# Patient Record
Sex: FEMALE | Race: WHITE | Employment: OTHER | URBAN - METROPOLITAN AREA
[De-identification: names, ages, dates, MRNs, and addresses within clinical notes are randomized per-mention and may not be internally consistent; named-entity substitution may affect disease eponyms.]

---

## 2017-06-27 PROBLEM — E66.09 NON MORBID OBESITY DUE TO EXCESS CALORIES: Status: ACTIVE | Noted: 2017-06-27

## 2018-08-06 ENCOUNTER — OFFICE VISIT (OUTPATIENT)
Dept: CARDIOLOGY CLINIC | Age: 63
End: 2018-08-06
Payer: MEDICARE

## 2018-08-06 VITALS
WEIGHT: 200 LBS | RESPIRATION RATE: 16 BRPM | BODY MASS INDEX: 36.8 KG/M2 | DIASTOLIC BLOOD PRESSURE: 80 MMHG | SYSTOLIC BLOOD PRESSURE: 118 MMHG | HEART RATE: 89 BPM | HEIGHT: 62 IN

## 2018-08-06 DIAGNOSIS — Z94.0 S/P CADAVER RENAL TRANSPLANT: ICD-10-CM

## 2018-08-06 DIAGNOSIS — N18.6 ESRD (END STAGE RENAL DISEASE) (HCC): ICD-10-CM

## 2018-08-06 DIAGNOSIS — R94.31 ABNORMAL EKG: Primary | ICD-10-CM

## 2018-08-06 DIAGNOSIS — E66.09 NON MORBID OBESITY DUE TO EXCESS CALORIES: ICD-10-CM

## 2018-08-06 DIAGNOSIS — I10 ESSENTIAL HYPERTENSION: ICD-10-CM

## 2018-08-06 DIAGNOSIS — I73.9 PERIPHERAL VASCULAR DISEASE (HCC): ICD-10-CM

## 2018-08-06 DIAGNOSIS — I44.7 LBBB (LEFT BUNDLE BRANCH BLOCK): ICD-10-CM

## 2018-08-06 DIAGNOSIS — I42.8 NONISCHEMIC CARDIOMYOPATHY (HCC): ICD-10-CM

## 2018-08-06 PROCEDURE — 99214 OFFICE O/P EST MOD 30 MIN: CPT | Performed by: INTERNAL MEDICINE

## 2018-08-06 PROCEDURE — G8417 CALC BMI ABV UP PARAM F/U: HCPCS | Performed by: INTERNAL MEDICINE

## 2018-08-06 PROCEDURE — G8427 DOCREV CUR MEDS BY ELIG CLIN: HCPCS | Performed by: INTERNAL MEDICINE

## 2018-08-06 PROCEDURE — 93000 ELECTROCARDIOGRAM COMPLETE: CPT | Performed by: INTERNAL MEDICINE

## 2018-08-06 PROCEDURE — 3017F COLORECTAL CA SCREEN DOC REV: CPT | Performed by: INTERNAL MEDICINE

## 2018-08-06 PROCEDURE — 1036F TOBACCO NON-USER: CPT | Performed by: INTERNAL MEDICINE

## 2018-08-06 RX ORDER — LISINOPRIL 5 MG/1
5 TABLET ORAL DAILY
Status: ON HOLD | COMMUNITY
End: 2021-11-27 | Stop reason: HOSPADM

## 2018-08-06 NOTE — PROGRESS NOTES
or short of breath   No palpitations, dizzy or syncope. Active at home   No orthopnea   Try to watch diet   Compliant with all medications       Past Medical History:   Diagnosis Date    Abnormal EKG     left bundle branch block    Cancer (HCC)     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    Cerebrovascular disease     Clotting disorder ([facility] [address] 1985    thrombophlebitis after c section delivery    Depression     15 years followed by dr Giselle Elliott Hyperlipidemia     Hypertension     Hyperthyroidism     Peripheral vascular disease ([facility] [address]     Renal failure 11/2012    renal transplant    Thrombophlebitis     after c section delivery    Thyroid disease             Past Surgical History:   Procedure Laterality Date   [address]    one normal delivery    COLONOSCOPY      DIAGNOSTIC CARDIAC CATH LAB PROCEDURE  2006    normal coronaries and 1996 normal coronaries    DIALYSIS FISTULA CREATION  march and july 2012    phase one and two patient will start dialysis when needed   [address]    transfemoral venous bypass grafts    KIDNEY TRANSPLANT  2016   [facility]  2015   [facility]  2015    PARATHYROIDECTOMY  2006    left parathyroid  implant and parathyroidectomy        History reviewed. No pertinent family history.        Social History   Substance Use Topics    Smoking status: Former Smoker     Packs/day: 1.00     Years: 20.00     Quit date: 10/1/2015    Smokeless tobacco: Never Used    Alcohol use No      Comment: 3-4 cups coffee per day         Review of Systems:  HEENT: negative for acute visual symptoms or auditory problems, no dysphagia  Constitutional: negative for fever and chills, or significant weight loss  Respiratory: negative for cough, wheezing, or hemoptysis  Cardiovascular: negative for chest pain, palpitations, and dyspnea  Gastrointestinal: negative for abdominal pain, diarrhea, nausea and vomiting  Endocrine: Negative for polyuria and polydyspsia  Genitourinary:negative for dysuria and hematuria  Derm: negative for rash and skin lesion(s)  Neurological: negative for tingling, numbness, weakness, seizures and tremors  Endocrine: negative for polydipsia and polyuria  Musculoskeletal: negative for pain or tenderness  Psychiatric: negative for anxiety, depression, or suicidal ideations         O:  COMPLETE PHYSICAL EXAM:       /80   Pulse 89   Resp 16   Ht 5' 2\" (1.575 m)   Wt 200 lb (90.7 kg)   BMI 36.58 kg/m²       General:   Patient alert, comfortable, no distress. Appears stated age. HEENT:    Pupils equal, no icterus, no nasal drainage, tongue moist.   Neck:              No masses, Thyroid not palpable. Chest:   Normal configuration, non tender. Lungs:   Clear to auscultation bilaterally, few scattered rhonchi. Cardiovascular:  Regular rhythm, 1/6 systolic murmur, No S3, PMI at 5th ICS, no palpable thrills, No elevated JVD, No carotid bruit. Abdomen:  Soft, Non tender, Bowel sounds normal, no pulsatile abdominal aorta, Obese   Extremities:  + edema. Distal pulses palpable. No cyanosis, no clubbing. Skin:   Good turgor, warm and dry, no cyanosis. Musculoskeletal: No joint swelling or deformity. Neuro:   Cranial nerves grossly intact; No focal neurologic deficit. Psych:   Alert, good mood and effect. REVIEW OF DIAGNOSTIC TESTS:        Electrocardiogram: NSR, LBBB               A/P:   ASSESSMENT / PLAN:    Hilda Whitfield was seen today for cardiomyopathy. Diagnoses and all orders for this visit:    Abnormal EKG  -     EKG 12 Lead    Nonischemic cardiomyopathy [facility]): stable    Mild MR: Stable-Echo 2017    Essential hypertension: stable    LBBB (left bundle branch block): Chronic    Peripheral vascular disease ([facility] [address] / s/p ? Femoral bypass in 1985-88, Dr Chaz Hernandez    ESRD (end stage renal disease) ([facility] [address]    Non morbid obesity due to excess calories    S/p cadaver renal transplant in 2015     Preventive Cardiology: Low

## 2019-03-02 ENCOUNTER — HOSPITAL ENCOUNTER (EMERGENCY)
Age: 64
Discharge: HOME | End: 2019-03-02
Attending: EMERGENCY MEDICINE
Payer: MEDICARE

## 2019-03-02 VITALS
OXYGEN SATURATION: 93 % | HEIGHT: 62 IN | DIASTOLIC BLOOD PRESSURE: 86 MMHG | SYSTOLIC BLOOD PRESSURE: 142 MMHG | WEIGHT: 200 LBS | RESPIRATION RATE: 16 BRPM | HEART RATE: 92 BPM | TEMPERATURE: 98.4 F | BODY MASS INDEX: 36.8 KG/M2

## 2019-03-02 DIAGNOSIS — J20.9 ACUTE BRONCHITIS, UNSPECIFIED ORGANISM: Primary | ICD-10-CM

## 2019-03-02 PROCEDURE — 99283 EMERGENCY DEPT VISIT LOW MDM: CPT

## 2019-03-02 PROCEDURE — 6360000002 HC RX W HCPCS: Performed by: EMERGENCY MEDICINE

## 2019-03-02 PROCEDURE — 96372 THER/PROPH/DIAG INJ SC/IM: CPT

## 2019-03-02 PROCEDURE — G0382 LEV 3 HOSP TYPE B ED VISIT: HCPCS

## 2019-03-02 PROCEDURE — 6370000000 HC RX 637 (ALT 250 FOR IP): Performed by: EMERGENCY MEDICINE

## 2019-03-02 RX ORDER — ALBUTEROL SULFATE 90 UG/1
2 AEROSOL, METERED RESPIRATORY (INHALATION) EVERY 6 HOURS PRN
Qty: 1 INHALER | Refills: 0 | Status: SHIPPED | OUTPATIENT
Start: 2019-03-02 | End: 2019-07-31 | Stop reason: ALTCHOICE

## 2019-03-02 RX ORDER — DEXTROMETHORPHAN HYDROBROMIDE AND PROMETHAZINE HYDROCHLORIDE 15; 6.25 MG/5ML; MG/5ML
5 SYRUP ORAL 4 TIMES DAILY PRN
Qty: 120 ML | Refills: 0 | Status: SHIPPED | OUTPATIENT
Start: 2019-03-02 | End: 2019-03-09

## 2019-03-02 RX ORDER — IPRATROPIUM BROMIDE AND ALBUTEROL SULFATE 2.5; .5 MG/3ML; MG/3ML
1 SOLUTION RESPIRATORY (INHALATION) ONCE
Status: COMPLETED | OUTPATIENT
Start: 2019-03-02 | End: 2019-03-02

## 2019-03-02 RX ORDER — ACETAMINOPHEN 500 MG
1000 TABLET ORAL EVERY 6 HOURS PRN
Status: ON HOLD | COMMUNITY
End: 2019-12-23 | Stop reason: HOSPADM

## 2019-03-02 RX ORDER — AZITHROMYCIN 250 MG/1
TABLET, FILM COATED ORAL
Qty: 1 PACKET | Refills: 0 | Status: SHIPPED | OUTPATIENT
Start: 2019-03-02 | End: 2019-03-12

## 2019-03-02 RX ORDER — DEXAMETHASONE SODIUM PHOSPHATE 10 MG/ML
10 INJECTION, SOLUTION INTRAMUSCULAR; INTRAVENOUS ONCE
Status: COMPLETED | OUTPATIENT
Start: 2019-03-02 | End: 2019-03-02

## 2019-03-02 RX ADMIN — DEXAMETHASONE SODIUM PHOSPHATE 10 MG: 10 INJECTION, SOLUTION INTRAMUSCULAR; INTRAVENOUS at 10:34

## 2019-03-02 RX ADMIN — IPRATROPIUM BROMIDE AND ALBUTEROL SULFATE 1 AMPULE: .5; 3 SOLUTION RESPIRATORY (INHALATION) at 10:36

## 2019-03-02 ASSESSMENT — ENCOUNTER SYMPTOMS
BACK PAIN: 0
SINUS PRESSURE: 0
DIARRHEA: 0
RHINORRHEA: 1
EYE REDNESS: 0
WHEEZING: 1
SORE THROAT: 0
NAUSEA: 0
COUGH: 1
EYE DISCHARGE: 0
VOMITING: 0
SHORTNESS OF BREATH: 0
ABDOMINAL DISTENTION: 0
EYE PAIN: 0

## 2019-03-02 ASSESSMENT — PAIN DESCRIPTION - LOCATION: LOCATION: CHEST;THROAT

## 2019-03-02 ASSESSMENT — PAIN DESCRIPTION - DESCRIPTORS: DESCRIPTORS: SORE

## 2019-03-02 ASSESSMENT — PAIN DESCRIPTION - PAIN TYPE: TYPE: ACUTE PAIN

## 2019-03-02 ASSESSMENT — PAIN SCALES - GENERAL: PAINLEVEL_OUTOF10: 3

## 2019-03-02 ASSESSMENT — PAIN DESCRIPTION - FREQUENCY: FREQUENCY: CONTINUOUS

## 2019-03-02 ASSESSMENT — PAIN DESCRIPTION - PROGRESSION: CLINICAL_PROGRESSION: NOT CHANGED

## 2019-06-05 ENCOUNTER — HOSPITAL ENCOUNTER (OUTPATIENT)
Dept: NUCLEAR MEDICINE | Age: 64
Discharge: HOME | End: 2019-06-05
Payer: MEDICARE

## 2019-06-05 DIAGNOSIS — E21.3 HPTH (HYPERPARATHYROIDISM) (HCC): ICD-10-CM

## 2019-06-05 DIAGNOSIS — Z94.0 KIDNEY REPLACED BY TRANSPLANT: ICD-10-CM

## 2019-06-05 PROCEDURE — 78070 PARATHYROID PLANAR IMAGING: CPT

## 2019-06-05 PROCEDURE — 3430000000 HC RX DIAGNOSTIC RADIOPHARMACEUTICAL: Performed by: RADIOLOGY

## 2019-06-05 PROCEDURE — A9500 TC99M SESTAMIBI: HCPCS | Performed by: RADIOLOGY

## 2019-06-05 RX ADMIN — Medication 20 MILLICURIE: at 11:53

## 2019-07-24 ENCOUNTER — HOSPITAL ENCOUNTER (OUTPATIENT)
Dept: ULTRASOUND IMAGING | Age: 64
Discharge: HOME | End: 2019-07-24
Payer: MEDICARE

## 2019-07-24 DIAGNOSIS — E04.1 THYROID NODULE: ICD-10-CM

## 2019-07-24 PROCEDURE — 76536 US EXAM OF HEAD AND NECK: CPT

## 2019-07-31 ENCOUNTER — OFFICE VISIT (OUTPATIENT)
Dept: CARDIOLOGY CLINIC | Age: 64
End: 2019-07-31
Payer: MEDICARE

## 2019-07-31 VITALS
HEART RATE: 92 BPM | BODY MASS INDEX: 39.38 KG/M2 | WEIGHT: 214 LBS | SYSTOLIC BLOOD PRESSURE: 130 MMHG | HEIGHT: 62 IN | DIASTOLIC BLOOD PRESSURE: 80 MMHG

## 2019-07-31 DIAGNOSIS — I42.8 NONISCHEMIC CARDIOMYOPATHY (HCC): Primary | ICD-10-CM

## 2019-07-31 DIAGNOSIS — I05.1 RHEUMATIC MITRAL REGURGITATION: ICD-10-CM

## 2019-07-31 PROCEDURE — 3017F COLORECTAL CA SCREEN DOC REV: CPT | Performed by: NURSE PRACTITIONER

## 2019-07-31 PROCEDURE — G8417 CALC BMI ABV UP PARAM F/U: HCPCS | Performed by: NURSE PRACTITIONER

## 2019-07-31 PROCEDURE — 93000 ELECTROCARDIOGRAM COMPLETE: CPT | Performed by: INTERNAL MEDICINE

## 2019-07-31 PROCEDURE — G8427 DOCREV CUR MEDS BY ELIG CLIN: HCPCS | Performed by: NURSE PRACTITIONER

## 2019-07-31 PROCEDURE — 99214 OFFICE O/P EST MOD 30 MIN: CPT | Performed by: NURSE PRACTITIONER

## 2019-07-31 PROCEDURE — 1036F TOBACCO NON-USER: CPT | Performed by: NURSE PRACTITIONER

## 2019-12-11 ENCOUNTER — HOSPITAL ENCOUNTER (EMERGENCY)
Age: 64
Discharge: HOME | End: 2019-12-11
Attending: EMERGENCY MEDICINE
Payer: MEDICARE

## 2019-12-11 VITALS
RESPIRATION RATE: 16 BRPM | OXYGEN SATURATION: 95 % | HEIGHT: 62 IN | WEIGHT: 200 LBS | TEMPERATURE: 97.7 F | SYSTOLIC BLOOD PRESSURE: 143 MMHG | DIASTOLIC BLOOD PRESSURE: 91 MMHG | HEART RATE: 96 BPM | BODY MASS INDEX: 36.8 KG/M2

## 2019-12-11 DIAGNOSIS — B34.9 VIRAL ILLNESS: Primary | ICD-10-CM

## 2019-12-11 LAB
BASOPHILS ABSOLUTE: 0.04 E9/L (ref 0–0.2)
BASOPHILS RELATIVE PERCENT: 0.4 % (ref 0–2)
CO2: 21 MMOL/L (ref 22–29)
EOSINOPHILS ABSOLUTE: 0.03 E9/L (ref 0.05–0.5)
EOSINOPHILS RELATIVE PERCENT: 0.3 % (ref 0–6)
GFR AFRICAN AMERICAN: 50
GFR NON-AFRICAN AMERICAN: 41 ML/MIN/1.73
GLUCOSE BLD-MCNC: 138 MG/DL (ref 74–99)
HCT VFR BLD CALC: 38.7 % (ref 34–48)
HEMOGLOBIN: 12.4 G/DL (ref 11.5–15.5)
IMMATURE GRANULOCYTES #: 0.03 E9/L
IMMATURE GRANULOCYTES %: 0.3 % (ref 0–5)
LYMPHOCYTES ABSOLUTE: 0.5 E9/L (ref 1.5–4)
LYMPHOCYTES RELATIVE PERCENT: 5.2 % (ref 20–42)
MCH RBC QN AUTO: 31.6 PG (ref 26–35)
MCHC RBC AUTO-ENTMCNC: 32 % (ref 32–34.5)
MCV RBC AUTO: 98.7 FL (ref 80–99.9)
MONOCYTES ABSOLUTE: 0.9 E9/L (ref 0.1–0.95)
MONOCYTES RELATIVE PERCENT: 9.3 % (ref 2–12)
NEUTROPHILS ABSOLUTE: 8.19 E9/L (ref 1.8–7.3)
NEUTROPHILS RELATIVE PERCENT: 84.5 % (ref 43–80)
PDW BLD-RTO: 13.7 FL (ref 11.5–15)
PLATELET # BLD: 139 E9/L (ref 130–450)
PMV BLD AUTO: 13.4 FL (ref 7–12)
POC ANION GAP: 8 MMOL/L (ref 7–16)
POC BUN: 17 MG/DL (ref 8–23)
POC CHLORIDE: 102 MMOL/L (ref 100–108)
POC CREATININE: 1.3 MG/DL (ref 0.5–1)
POC POTASSIUM: 4.5 MMOL/L (ref 3.5–5)
POC SODIUM: 131 MMOL/L (ref 132–146)
RBC # BLD: 3.92 E12/L (ref 3.5–5.5)
RBC # BLD: NORMAL 10*6/UL
WBC # BLD: 9.7 E9/L (ref 4.5–11.5)

## 2019-12-11 PROCEDURE — 85025 COMPLETE CBC W/AUTO DIFF WBC: CPT

## 2019-12-11 PROCEDURE — 80051 ELECTROLYTE PANEL: CPT

## 2019-12-11 PROCEDURE — 82565 ASSAY OF CREATININE: CPT

## 2019-12-11 PROCEDURE — 84520 ASSAY OF UREA NITROGEN: CPT

## 2019-12-11 PROCEDURE — 6370000000 HC RX 637 (ALT 250 FOR IP): Performed by: EMERGENCY MEDICINE

## 2019-12-11 PROCEDURE — 36415 COLL VENOUS BLD VENIPUNCTURE: CPT

## 2019-12-11 PROCEDURE — G0382 LEV 3 HOSP TYPE B ED VISIT: HCPCS

## 2019-12-11 PROCEDURE — 82947 ASSAY GLUCOSE BLOOD QUANT: CPT

## 2019-12-11 RX ORDER — BROMPHENIRAMINE MALEATE, PSEUDOEPHEDRINE HYDROCHLORIDE, AND DEXTROMETHORPHAN HYDROBROMIDE 2; 30; 10 MG/5ML; MG/5ML; MG/5ML
5 SYRUP ORAL 4 TIMES DAILY PRN
Qty: 118 ML | Refills: 1 | Status: SHIPPED | OUTPATIENT
Start: 2019-12-11 | End: 2019-12-17 | Stop reason: ALTCHOICE

## 2019-12-11 RX ORDER — ONDANSETRON 4 MG/1
4 TABLET, ORALLY DISINTEGRATING ORAL ONCE
Status: COMPLETED | OUTPATIENT
Start: 2019-12-11 | End: 2019-12-11

## 2019-12-11 RX ORDER — ONDANSETRON 4 MG/1
4 TABLET, ORALLY DISINTEGRATING ORAL EVERY 8 HOURS PRN
Qty: 8 TABLET | Refills: 0 | Status: SHIPPED | OUTPATIENT
Start: 2019-12-11 | End: 2019-12-17 | Stop reason: ALTCHOICE

## 2019-12-11 RX ADMIN — ONDANSETRON 4 MG: 4 TABLET, ORALLY DISINTEGRATING ORAL at 13:18

## 2019-12-11 ASSESSMENT — ENCOUNTER SYMPTOMS
SHORTNESS OF BREATH: 0
NAUSEA: 1
RHINORRHEA: 0
SORE THROAT: 0
EYE REDNESS: 0
SINUS PRESSURE: 0
VOMITING: 0
DIARRHEA: 0
EYE PAIN: 0
ABDOMINAL DISTENTION: 0
WHEEZING: 0
COUGH: 0
EYE DISCHARGE: 0
BACK PAIN: 0

## 2019-12-17 ENCOUNTER — APPOINTMENT (OUTPATIENT)
Dept: GENERAL RADIOLOGY | Age: 64
DRG: 872 | End: 2019-12-17
Payer: MEDICARE

## 2019-12-17 ENCOUNTER — HOSPITAL ENCOUNTER (INPATIENT)
Age: 64
LOS: 7 days | Discharge: HOME HEALTH | DRG: 872 | End: 2019-12-24
Attending: EMERGENCY MEDICINE | Admitting: INTERNAL MEDICINE
Payer: MEDICARE

## 2019-12-17 ENCOUNTER — APPOINTMENT (OUTPATIENT)
Dept: CT IMAGING | Age: 64
DRG: 872 | End: 2019-12-17
Payer: MEDICARE

## 2019-12-17 DIAGNOSIS — A41.9 SEPSIS WITHOUT ACUTE ORGAN DYSFUNCTION, DUE TO UNSPECIFIED ORGANISM (HCC): Primary | ICD-10-CM

## 2019-12-17 LAB
ALBUMIN SERPL-MCNC: 2.6 G/DL (ref 3.5–5.2)
ALP BLD-CCNC: 96 U/L (ref 35–104)
ALT SERPL-CCNC: 10 U/L (ref 0–32)
ANION GAP SERPL CALCULATED.3IONS-SCNC: 17 MMOL/L (ref 7–16)
AST SERPL-CCNC: 15 U/L (ref 0–31)
BACTERIA: ABNORMAL /HPF
BASOPHILS ABSOLUTE: 0 E9/L (ref 0–0.2)
BASOPHILS RELATIVE PERCENT: 0.2 % (ref 0–2)
BILIRUB SERPL-MCNC: 1.5 MG/DL (ref 0–1.2)
BILIRUBIN URINE: ABNORMAL
BLOOD, URINE: ABNORMAL
BUN BLDV-MCNC: 20 MG/DL (ref 8–23)
BURR CELLS: ABNORMAL
CALCIUM SERPL-MCNC: 10.5 MG/DL (ref 8.6–10.2)
CHLORIDE BLD-SCNC: 99 MMOL/L (ref 98–107)
CLARITY: ABNORMAL
CO2: 20 MMOL/L (ref 22–29)
COLOR: ABNORMAL
CREAT SERPL-MCNC: 1.7 MG/DL (ref 0.5–1)
EKG ATRIAL RATE: 110 BPM
EKG P AXIS: 63 DEGREES
EKG P-R INTERVAL: 150 MS
EKG Q-T INTERVAL: 352 MS
EKG QRS DURATION: 138 MS
EKG QTC CALCULATION (BAZETT): 476 MS
EKG R AXIS: -59 DEGREES
EKG T AXIS: 101 DEGREES
EKG VENTRICULAR RATE: 110 BPM
EOSINOPHILS ABSOLUTE: 0 E9/L (ref 0.05–0.5)
EOSINOPHILS RELATIVE PERCENT: 0 % (ref 0–6)
GFR AFRICAN AMERICAN: 37
GFR NON-AFRICAN AMERICAN: 30 ML/MIN/1.73
GLUCOSE BLD-MCNC: 151 MG/DL (ref 74–99)
GLUCOSE URINE: NEGATIVE MG/DL
HCT VFR BLD CALC: 40.2 % (ref 34–48)
HEMOGLOBIN: 12.5 G/DL (ref 11.5–15.5)
KETONES, URINE: ABNORMAL MG/DL
LACTIC ACID, SEPSIS: 1.2 MMOL/L (ref 0.5–1.9)
LEUKOCYTE ESTERASE, URINE: ABNORMAL
LYMPHOCYTES ABSOLUTE: 0.54 E9/L (ref 1.5–4)
LYMPHOCYTES RELATIVE PERCENT: 2.6 % (ref 20–42)
MCH RBC QN AUTO: 31.6 PG (ref 26–35)
MCHC RBC AUTO-ENTMCNC: 31.1 % (ref 32–34.5)
MCV RBC AUTO: 101.8 FL (ref 80–99.9)
MONOCYTES ABSOLUTE: 0.54 E9/L (ref 0.1–0.95)
MONOCYTES RELATIVE PERCENT: 2.6 % (ref 2–12)
NEUTROPHILS ABSOLUTE: 17.1 E9/L (ref 1.8–7.3)
NEUTROPHILS RELATIVE PERCENT: 94.8 % (ref 43–80)
NITRITE, URINE: NEGATIVE
PDW BLD-RTO: 14 FL (ref 11.5–15)
PH UA: 5.5 (ref 5–9)
PLATELET # BLD: 183 E9/L (ref 130–450)
PMV BLD AUTO: 13.2 FL (ref 7–12)
POIKILOCYTES: ABNORMAL
POLYCHROMASIA: ABNORMAL
POTASSIUM REFLEX MAGNESIUM: 5.2 MMOL/L (ref 3.5–5)
PROTEIN UA: 100 MG/DL
RBC # BLD: 3.95 E12/L (ref 3.5–5.5)
RBC UA: ABNORMAL /HPF (ref 0–2)
SODIUM BLD-SCNC: 136 MMOL/L (ref 132–146)
SPECIFIC GRAVITY UA: 1.02 (ref 1–1.03)
TEAR DROP CELLS: ABNORMAL
TOTAL PROTEIN: 7.2 G/DL (ref 6.4–8.3)
UROBILINOGEN, URINE: >=8 E.U./DL
WBC # BLD: 18 E9/L (ref 4.5–11.5)
WBC UA: >20 /HPF (ref 0–5)

## 2019-12-17 PROCEDURE — 87077 CULTURE AEROBIC IDENTIFY: CPT

## 2019-12-17 PROCEDURE — 87186 SC STD MICRODIL/AGAR DIL: CPT

## 2019-12-17 PROCEDURE — 6360000002 HC RX W HCPCS: Performed by: INTERNAL MEDICINE

## 2019-12-17 PROCEDURE — 2580000003 HC RX 258: Performed by: INTERNAL MEDICINE

## 2019-12-17 PROCEDURE — 71046 X-RAY EXAM CHEST 2 VIEWS: CPT

## 2019-12-17 PROCEDURE — 85025 COMPLETE CBC W/AUTO DIFF WBC: CPT

## 2019-12-17 PROCEDURE — 99285 EMERGENCY DEPT VISIT HI MDM: CPT

## 2019-12-17 PROCEDURE — 93005 ELECTROCARDIOGRAM TRACING: CPT | Performed by: EMERGENCY MEDICINE

## 2019-12-17 PROCEDURE — 2580000003 HC RX 258: Performed by: EMERGENCY MEDICINE

## 2019-12-17 PROCEDURE — 87150 DNA/RNA AMPLIFIED PROBE: CPT

## 2019-12-17 PROCEDURE — 6360000002 HC RX W HCPCS: Performed by: EMERGENCY MEDICINE

## 2019-12-17 PROCEDURE — 80053 COMPREHEN METABOLIC PANEL: CPT

## 2019-12-17 PROCEDURE — 96365 THER/PROPH/DIAG IV INF INIT: CPT

## 2019-12-17 PROCEDURE — 87040 BLOOD CULTURE FOR BACTERIA: CPT

## 2019-12-17 PROCEDURE — 87088 URINE BACTERIA CULTURE: CPT

## 2019-12-17 PROCEDURE — 71250 CT THORAX DX C-: CPT

## 2019-12-17 PROCEDURE — 83605 ASSAY OF LACTIC ACID: CPT

## 2019-12-17 PROCEDURE — 6370000000 HC RX 637 (ALT 250 FOR IP): Performed by: INTERNAL MEDICINE

## 2019-12-17 PROCEDURE — 36415 COLL VENOUS BLD VENIPUNCTURE: CPT

## 2019-12-17 PROCEDURE — 1200000000 HC SEMI PRIVATE

## 2019-12-17 PROCEDURE — 81001 URINALYSIS AUTO W/SCOPE: CPT

## 2019-12-17 RX ORDER — SODIUM CHLORIDE 9 MG/ML
INJECTION, SOLUTION INTRAVENOUS CONTINUOUS
Status: DISCONTINUED | OUTPATIENT
Start: 2019-12-17 | End: 2019-12-20

## 2019-12-17 RX ORDER — LISINOPRIL 10 MG/1
5 TABLET ORAL DAILY
Status: DISCONTINUED | OUTPATIENT
Start: 2019-12-18 | End: 2019-12-24 | Stop reason: HOSPADM

## 2019-12-17 RX ORDER — CLOZAPINE 50 MG/1
50 TABLET ORAL NIGHTLY
Status: ON HOLD | COMMUNITY
End: 2022-03-21 | Stop reason: HOSPADM

## 2019-12-17 RX ORDER — DESVENLAFAXINE 50 MG/1
50 TABLET, EXTENDED RELEASE ORAL DAILY
Status: DISCONTINUED | OUTPATIENT
Start: 2019-12-17 | End: 2019-12-17

## 2019-12-17 RX ORDER — METOPROLOL SUCCINATE 25 MG/1
50 TABLET, EXTENDED RELEASE ORAL DAILY
Status: DISCONTINUED | OUTPATIENT
Start: 2019-12-18 | End: 2019-12-24 | Stop reason: HOSPADM

## 2019-12-17 RX ORDER — 0.9 % SODIUM CHLORIDE 0.9 %
30 INTRAVENOUS SOLUTION INTRAVENOUS ONCE
Status: COMPLETED | OUTPATIENT
Start: 2019-12-17 | End: 2019-12-17

## 2019-12-17 RX ORDER — PREDNISONE 1 MG/1
5 TABLET ORAL DAILY
Status: ON HOLD | COMMUNITY
End: 2022-05-21 | Stop reason: HOSPADM

## 2019-12-17 RX ORDER — ALBUTEROL SULFATE 2.5 MG/3ML
2.5 SOLUTION RESPIRATORY (INHALATION) 4 TIMES DAILY
Status: DISCONTINUED | OUTPATIENT
Start: 2019-12-17 | End: 2019-12-24 | Stop reason: HOSPADM

## 2019-12-17 RX ORDER — CYCLOSPORINE 25 MG/1
100 CAPSULE, GELATIN COATED ORAL 2 TIMES DAILY
Status: DISCONTINUED | OUTPATIENT
Start: 2019-12-17 | End: 2019-12-24 | Stop reason: HOSPADM

## 2019-12-17 RX ORDER — FOLIC ACID 1 MG/1
1 TABLET ORAL DAILY
Status: DISCONTINUED | OUTPATIENT
Start: 2019-12-18 | End: 2019-12-24 | Stop reason: HOSPADM

## 2019-12-17 RX ORDER — FUROSEMIDE 20 MG/1
20 TABLET ORAL DAILY PRN
Status: ON HOLD | COMMUNITY
End: 2021-11-27 | Stop reason: HOSPADM

## 2019-12-17 RX ORDER — CLOZAPINE 100 MG/1
50 TABLET ORAL NIGHTLY
Status: DISCONTINUED | OUTPATIENT
Start: 2019-12-17 | End: 2019-12-24 | Stop reason: HOSPADM

## 2019-12-17 RX ORDER — SIMVASTATIN 20 MG
20 TABLET ORAL NIGHTLY
Status: DISCONTINUED | OUTPATIENT
Start: 2019-12-17 | End: 2019-12-24 | Stop reason: HOSPADM

## 2019-12-17 RX ORDER — ONDANSETRON 2 MG/ML
4 INJECTION INTRAMUSCULAR; INTRAVENOUS EVERY 6 HOURS PRN
Status: DISCONTINUED | OUTPATIENT
Start: 2019-12-17 | End: 2019-12-22

## 2019-12-17 RX ORDER — DESVENLAFAXINE 50 MG/1
50 TABLET, EXTENDED RELEASE ORAL EVERY EVENING
Status: DISCONTINUED | OUTPATIENT
Start: 2019-12-17 | End: 2019-12-24 | Stop reason: HOSPADM

## 2019-12-17 RX ORDER — MYCOPHENOLATE MOFETIL 500 MG/1
1000 TABLET ORAL 2 TIMES DAILY
Status: DISCONTINUED | OUTPATIENT
Start: 2019-12-17 | End: 2019-12-17 | Stop reason: CLARIF

## 2019-12-17 RX ORDER — DOCUSATE SODIUM 100 MG/1
100 CAPSULE, LIQUID FILLED ORAL 2 TIMES DAILY PRN
Status: DISCONTINUED | OUTPATIENT
Start: 2019-12-17 | End: 2019-12-24 | Stop reason: HOSPADM

## 2019-12-17 RX ORDER — SODIUM CHLORIDE 9 MG/ML
INJECTION, SOLUTION INTRAVENOUS CONTINUOUS
Status: DISCONTINUED | OUTPATIENT
Start: 2019-12-17 | End: 2019-12-21

## 2019-12-17 RX ORDER — CYCLOSPORINE 100 MG/1
100 CAPSULE, GELATIN COATED ORAL 2 TIMES DAILY
COMMUNITY

## 2019-12-17 RX ORDER — MYCOPHENOLATE MOFETIL 250 MG/1
1000 CAPSULE ORAL 2 TIMES DAILY
Status: DISCONTINUED | OUTPATIENT
Start: 2019-12-17 | End: 2019-12-24 | Stop reason: HOSPADM

## 2019-12-17 RX ORDER — ACETAMINOPHEN 325 MG/1
650 TABLET ORAL EVERY 6 HOURS PRN
Status: DISCONTINUED | OUTPATIENT
Start: 2019-12-17 | End: 2019-12-24 | Stop reason: HOSPADM

## 2019-12-17 RX ORDER — LEVOTHYROXINE SODIUM 0.05 MG/1
50 TABLET ORAL DAILY
Status: DISCONTINUED | OUTPATIENT
Start: 2019-12-18 | End: 2019-12-24 | Stop reason: HOSPADM

## 2019-12-17 RX ORDER — METOPROLOL SUCCINATE 50 MG/1
50 TABLET, EXTENDED RELEASE ORAL DAILY
COMMUNITY
End: 2020-07-23

## 2019-12-17 RX ORDER — PREDNISONE 1 MG/1
5 TABLET ORAL DAILY
Status: DISCONTINUED | OUTPATIENT
Start: 2019-12-18 | End: 2019-12-24 | Stop reason: HOSPADM

## 2019-12-17 RX ADMIN — MYCOPHENOLATE MOFETIL 1000 MG: 250 CAPSULE ORAL at 21:38

## 2019-12-17 RX ADMIN — DESVENLAFAXINE SUCCINATE 50 MG: 50 TABLET, FILM COATED, EXTENDED RELEASE ORAL at 20:25

## 2019-12-17 RX ADMIN — CYCLOSPORINE 100 MG: 25 CAPSULE, GELATIN COATED ORAL at 20:25

## 2019-12-17 RX ADMIN — CLOZAPINE 50 MG: 100 TABLET ORAL at 20:25

## 2019-12-17 RX ADMIN — CEFEPIME 2 G: 2 INJECTION, POWDER, FOR SOLUTION INTRAVENOUS at 11:25

## 2019-12-17 RX ADMIN — CHOLECALCIFEROL TAB 125 MCG (5000 UNIT) 5000 UNITS: 125 TAB at 20:26

## 2019-12-17 RX ADMIN — ENOXAPARIN SODIUM 30 MG: 30 INJECTION SUBCUTANEOUS at 16:04

## 2019-12-17 RX ADMIN — SIMVASTATIN 20 MG: 20 TABLET, FILM COATED ORAL at 20:26

## 2019-12-17 RX ADMIN — SODIUM CHLORIDE: 900 INJECTION, SOLUTION INTRAVENOUS at 16:12

## 2019-12-17 RX ADMIN — SODIUM CHLORIDE 2721 ML: 9 INJECTION, SOLUTION INTRAVENOUS at 11:28

## 2019-12-17 RX ADMIN — WATER 1 G: 1 INJECTION INTRAMUSCULAR; INTRAVENOUS; SUBCUTANEOUS at 13:58

## 2019-12-17 ASSESSMENT — PAIN SCALES - GENERAL: PAINLEVEL_OUTOF10: 3

## 2019-12-18 ENCOUNTER — APPOINTMENT (OUTPATIENT)
Dept: ULTRASOUND IMAGING | Age: 64
DRG: 872 | End: 2019-12-18
Payer: MEDICARE

## 2019-12-18 LAB
ACINETOBACTER BAUMANNII BY PCR: NOT DETECTED
ALBUMIN SERPL-MCNC: 2.8 G/DL (ref 3.5–5.2)
ALP BLD-CCNC: 87 U/L (ref 35–104)
ALT SERPL-CCNC: 9 U/L (ref 0–32)
ANION GAP SERPL CALCULATED.3IONS-SCNC: 12 MMOL/L (ref 7–16)
AST SERPL-CCNC: 11 U/L (ref 0–31)
BILIRUB SERPL-MCNC: 0.9 MG/DL (ref 0–1.2)
BOTTLE TYPE: ABNORMAL
BUN BLDV-MCNC: 19 MG/DL (ref 8–23)
CALCIUM SERPL-MCNC: 9.3 MG/DL (ref 8.6–10.2)
CANDIDA ALBICANS BY PCR: NOT DETECTED
CANDIDA GLABRATA BY PCR: NOT DETECTED
CANDIDA KRUSEI BY PCR: NOT DETECTED
CANDIDA PARAPSILOSIS BY PCR: NOT DETECTED
CANDIDA TROPICALIS BY PCR: NOT DETECTED
CARBAPENEM RESISTANCE KPC BY PCR: NOT DETECTED
CHLORIDE BLD-SCNC: 106 MMOL/L (ref 98–107)
CO2: 19 MMOL/L (ref 22–29)
CREAT SERPL-MCNC: 1.4 MG/DL (ref 0.5–1)
ENTEROBACTER CLOACAE COMPLEX BY PCR: NOT DETECTED
ENTEROBACTERALES BY PCR: DETECTED
ENTEROCOCCUS BY PCR: NOT DETECTED
ESCHERICHIA COLI BY PCR: NOT DETECTED
GFR AFRICAN AMERICAN: 46
GFR NON-AFRICAN AMERICAN: 38 ML/MIN/1.73
GLUCOSE BLD-MCNC: 114 MG/DL (ref 74–99)
HAEMOPHILUS INFLUENZAE BY PCR: NOT DETECTED
HCT VFR BLD CALC: 34.3 % (ref 34–48)
HEMOGLOBIN: 10.5 G/DL (ref 11.5–15.5)
KLEBSIELLA OXYTOCA BY PCR: NOT DETECTED
KLEBSIELLA PNEUMONIAE GROUP BY PCR: DETECTED
LISTERIA MONOCYTOGENES BY PCR: NOT DETECTED
MCH RBC QN AUTO: 31.4 PG (ref 26–35)
MCHC RBC AUTO-ENTMCNC: 30.6 % (ref 32–34.5)
MCV RBC AUTO: 102.7 FL (ref 80–99.9)
NEISSERIA MENINGITIDIS BY PCR: NOT DETECTED
ORDER NUMBER: ABNORMAL
PDW BLD-RTO: 13.9 FL (ref 11.5–15)
PLATELET # BLD: 169 E9/L (ref 130–450)
PMV BLD AUTO: 12.7 FL (ref 7–12)
POTASSIUM SERPL-SCNC: 4.6 MMOL/L (ref 3.5–5)
PROTEUS BY PCR: NOT DETECTED
PSEUDOMONAS AERUGINOSA BY PCR: NOT DETECTED
RBC # BLD: 3.34 E12/L (ref 3.5–5.5)
SERRATIA MARCESCENS BY PCR: NOT DETECTED
SODIUM BLD-SCNC: 137 MMOL/L (ref 132–146)
SOURCE OF BLOOD CULTURE: ABNORMAL
STAPHYLOCOCCUS AUREUS BY PCR: NOT DETECTED
STAPHYLOCOCCUS SPECIES BY PCR: NOT DETECTED
STREPTOCOCCUS AGALACTIAE BY PCR: NOT DETECTED
STREPTOCOCCUS PNEUMONIAE BY PCR: NOT DETECTED
STREPTOCOCCUS PYOGENES  BY PCR: NOT DETECTED
STREPTOCOCCUS SPECIES BY PCR: NOT DETECTED
TOTAL PROTEIN: 6.2 G/DL (ref 6.4–8.3)
WBC # BLD: 15.1 E9/L (ref 4.5–11.5)

## 2019-12-18 PROCEDURE — 76775 US EXAM ABDO BACK WALL LIM: CPT

## 2019-12-18 PROCEDURE — 1200000000 HC SEMI PRIVATE

## 2019-12-18 PROCEDURE — 6360000002 HC RX W HCPCS: Performed by: INTERNAL MEDICINE

## 2019-12-18 PROCEDURE — 36415 COLL VENOUS BLD VENIPUNCTURE: CPT

## 2019-12-18 PROCEDURE — 2580000003 HC RX 258: Performed by: INTERNAL MEDICINE

## 2019-12-18 PROCEDURE — 85027 COMPLETE CBC AUTOMATED: CPT

## 2019-12-18 PROCEDURE — 80053 COMPREHEN METABOLIC PANEL: CPT

## 2019-12-18 PROCEDURE — 94640 AIRWAY INHALATION TREATMENT: CPT

## 2019-12-18 PROCEDURE — 6370000000 HC RX 637 (ALT 250 FOR IP): Performed by: INTERNAL MEDICINE

## 2019-12-18 PROCEDURE — 97162 PT EVAL MOD COMPLEX 30 MIN: CPT | Performed by: PHYSICAL THERAPIST

## 2019-12-18 PROCEDURE — 97116 GAIT TRAINING THERAPY: CPT | Performed by: PHYSICAL THERAPIST

## 2019-12-18 RX ADMIN — CEFEPIME HYDROCHLORIDE 1 G: 1 INJECTION, POWDER, FOR SOLUTION INTRAMUSCULAR; INTRAVENOUS at 21:16

## 2019-12-18 RX ADMIN — DESVENLAFAXINE SUCCINATE 50 MG: 50 TABLET, FILM COATED, EXTENDED RELEASE ORAL at 21:17

## 2019-12-18 RX ADMIN — MYCOPHENOLATE MOFETIL 1000 MG: 250 CAPSULE ORAL at 21:17

## 2019-12-18 RX ADMIN — ALBUTEROL SULFATE 2.5 MG: 2.5 SOLUTION RESPIRATORY (INHALATION) at 06:30

## 2019-12-18 RX ADMIN — CYCLOSPORINE 100 MG: 25 CAPSULE, GELATIN COATED ORAL at 21:16

## 2019-12-18 RX ADMIN — CLOZAPINE 50 MG: 100 TABLET ORAL at 21:17

## 2019-12-18 RX ADMIN — ALBUTEROL SULFATE 2.5 MG: 2.5 SOLUTION RESPIRATORY (INHALATION) at 17:21

## 2019-12-18 RX ADMIN — MYCOPHENOLATE MOFETIL 1000 MG: 250 CAPSULE ORAL at 10:07

## 2019-12-18 RX ADMIN — FOLIC ACID 1 MG: 1 TABLET ORAL at 09:10

## 2019-12-18 RX ADMIN — ENOXAPARIN SODIUM 30 MG: 30 INJECTION SUBCUTANEOUS at 09:10

## 2019-12-18 RX ADMIN — METOPROLOL SUCCINATE 50 MG: 25 TABLET, EXTENDED RELEASE ORAL at 09:10

## 2019-12-18 RX ADMIN — LEVOTHYROXINE SODIUM 50 MCG: 50 TABLET ORAL at 05:05

## 2019-12-18 RX ADMIN — LISINOPRIL 5 MG: 10 TABLET ORAL at 09:10

## 2019-12-18 RX ADMIN — CYCLOSPORINE 100 MG: 25 CAPSULE, GELATIN COATED ORAL at 09:10

## 2019-12-18 RX ADMIN — CEFEPIME HYDROCHLORIDE 1 G: 1 INJECTION, POWDER, FOR SOLUTION INTRAMUSCULAR; INTRAVENOUS at 10:07

## 2019-12-18 RX ADMIN — ALBUTEROL SULFATE 2.5 MG: 2.5 SOLUTION RESPIRATORY (INHALATION) at 11:20

## 2019-12-18 RX ADMIN — SIMVASTATIN 20 MG: 20 TABLET, FILM COATED ORAL at 21:17

## 2019-12-18 RX ADMIN — PREDNISONE 5 MG: 5 TABLET ORAL at 09:10

## 2019-12-18 ASSESSMENT — PAIN SCALES - GENERAL
PAINLEVEL_OUTOF10: 0

## 2019-12-19 LAB
ACANTHOCYTES: ABNORMAL
ALBUMIN SERPL-MCNC: 2.9 G/DL (ref 3.5–5.2)
ALP BLD-CCNC: 132 U/L (ref 35–104)
ALT SERPL-CCNC: 33 U/L (ref 0–32)
ANION GAP SERPL CALCULATED.3IONS-SCNC: 12 MMOL/L (ref 7–16)
AST SERPL-CCNC: 48 U/L (ref 0–31)
BASOPHILS ABSOLUTE: 0 E9/L (ref 0–0.2)
BASOPHILS RELATIVE PERCENT: 0.3 % (ref 0–2)
BILIRUB SERPL-MCNC: 0.9 MG/DL (ref 0–1.2)
BUN BLDV-MCNC: 20 MG/DL (ref 8–23)
BURR CELLS: ABNORMAL
CALCIUM SERPL-MCNC: 9.7 MG/DL (ref 8.6–10.2)
CHLORIDE BLD-SCNC: 109 MMOL/L (ref 98–107)
CO2: 20 MMOL/L (ref 22–29)
CREAT SERPL-MCNC: 1.3 MG/DL (ref 0.5–1)
EOSINOPHILS ABSOLUTE: 0.21 E9/L (ref 0.05–0.5)
EOSINOPHILS RELATIVE PERCENT: 1.8 % (ref 0–6)
GFR AFRICAN AMERICAN: 50
GFR NON-AFRICAN AMERICAN: 41 ML/MIN/1.73
GLUCOSE BLD-MCNC: 108 MG/DL (ref 74–99)
HCT VFR BLD CALC: 32.9 % (ref 34–48)
HEMOGLOBIN: 9.8 G/DL (ref 11.5–15.5)
LYMPHOCYTES ABSOLUTE: 0.35 E9/L (ref 1.5–4)
LYMPHOCYTES RELATIVE PERCENT: 2.6 % (ref 20–42)
MCH RBC QN AUTO: 30.8 PG (ref 26–35)
MCHC RBC AUTO-ENTMCNC: 29.8 % (ref 32–34.5)
MCV RBC AUTO: 103.5 FL (ref 80–99.9)
METAMYELOCYTES RELATIVE PERCENT: 0.9 % (ref 0–1)
MONOCYTES ABSOLUTE: 0.46 E9/L (ref 0.1–0.95)
MONOCYTES RELATIVE PERCENT: 3.5 % (ref 2–12)
NEUTROPHILS ABSOLUTE: 10.58 E9/L (ref 1.8–7.3)
NEUTROPHILS RELATIVE PERCENT: 91.2 % (ref 43–80)
ORGANISM: ABNORMAL
OVALOCYTES: ABNORMAL
PDW BLD-RTO: 14 FL (ref 11.5–15)
PLATELET # BLD: 178 E9/L (ref 130–450)
PMV BLD AUTO: 12.6 FL (ref 7–12)
POIKILOCYTES: ABNORMAL
POLYCHROMASIA: ABNORMAL
POTASSIUM SERPL-SCNC: 4.5 MMOL/L (ref 3.5–5)
RBC # BLD: 3.18 E12/L (ref 3.5–5.5)
SCHISTOCYTES: ABNORMAL
SODIUM BLD-SCNC: 141 MMOL/L (ref 132–146)
TARGET CELLS: ABNORMAL
TEAR DROP CELLS: ABNORMAL
TOTAL PROTEIN: 6.3 G/DL (ref 6.4–8.3)
URINE CULTURE, ROUTINE: ABNORMAL
WBC # BLD: 11.5 E9/L (ref 4.5–11.5)

## 2019-12-19 PROCEDURE — 36415 COLL VENOUS BLD VENIPUNCTURE: CPT

## 2019-12-19 PROCEDURE — 6370000000 HC RX 637 (ALT 250 FOR IP): Performed by: INTERNAL MEDICINE

## 2019-12-19 PROCEDURE — 85025 COMPLETE CBC W/AUTO DIFF WBC: CPT

## 2019-12-19 PROCEDURE — 80053 COMPREHEN METABOLIC PANEL: CPT

## 2019-12-19 PROCEDURE — 97110 THERAPEUTIC EXERCISES: CPT

## 2019-12-19 PROCEDURE — 97530 THERAPEUTIC ACTIVITIES: CPT

## 2019-12-19 PROCEDURE — 6360000002 HC RX W HCPCS: Performed by: INTERNAL MEDICINE

## 2019-12-19 PROCEDURE — 1200000000 HC SEMI PRIVATE

## 2019-12-19 PROCEDURE — 2580000003 HC RX 258: Performed by: INTERNAL MEDICINE

## 2019-12-19 PROCEDURE — 94640 AIRWAY INHALATION TREATMENT: CPT

## 2019-12-19 PROCEDURE — 51798 US URINE CAPACITY MEASURE: CPT

## 2019-12-19 RX ADMIN — CHOLECALCIFEROL TAB 125 MCG (5000 UNIT) 5000 UNITS: 125 TAB at 08:35

## 2019-12-19 RX ADMIN — ALBUTEROL SULFATE 2.5 MG: 2.5 SOLUTION RESPIRATORY (INHALATION) at 18:16

## 2019-12-19 RX ADMIN — DESVENLAFAXINE SUCCINATE 50 MG: 50 TABLET, FILM COATED, EXTENDED RELEASE ORAL at 20:00

## 2019-12-19 RX ADMIN — CYCLOSPORINE 100 MG: 25 CAPSULE, GELATIN COATED ORAL at 08:35

## 2019-12-19 RX ADMIN — ENOXAPARIN SODIUM 30 MG: 30 INJECTION SUBCUTANEOUS at 08:35

## 2019-12-19 RX ADMIN — ALBUTEROL SULFATE 2.5 MG: 2.5 SOLUTION RESPIRATORY (INHALATION) at 14:21

## 2019-12-19 RX ADMIN — MYCOPHENOLATE MOFETIL 1000 MG: 250 CAPSULE ORAL at 08:35

## 2019-12-19 RX ADMIN — CLOZAPINE 50 MG: 100 TABLET ORAL at 20:26

## 2019-12-19 RX ADMIN — MYCOPHENOLATE MOFETIL 1000 MG: 250 CAPSULE ORAL at 20:31

## 2019-12-19 RX ADMIN — SIMVASTATIN 20 MG: 20 TABLET, FILM COATED ORAL at 20:25

## 2019-12-19 RX ADMIN — CEFEPIME HYDROCHLORIDE 1 G: 1 INJECTION, POWDER, FOR SOLUTION INTRAMUSCULAR; INTRAVENOUS at 20:31

## 2019-12-19 RX ADMIN — LEVOTHYROXINE SODIUM 50 MCG: 50 TABLET ORAL at 05:41

## 2019-12-19 RX ADMIN — FOLIC ACID 1 MG: 1 TABLET ORAL at 08:35

## 2019-12-19 RX ADMIN — PREDNISONE 5 MG: 5 TABLET ORAL at 08:35

## 2019-12-19 RX ADMIN — LISINOPRIL 5 MG: 10 TABLET ORAL at 08:35

## 2019-12-19 RX ADMIN — ALBUTEROL SULFATE 2.5 MG: 2.5 SOLUTION RESPIRATORY (INHALATION) at 06:00

## 2019-12-19 RX ADMIN — CYCLOSPORINE 100 MG: 25 CAPSULE, GELATIN COATED ORAL at 20:31

## 2019-12-19 RX ADMIN — METOPROLOL SUCCINATE 50 MG: 25 TABLET, EXTENDED RELEASE ORAL at 08:35

## 2019-12-19 RX ADMIN — CEFEPIME HYDROCHLORIDE 1 G: 1 INJECTION, POWDER, FOR SOLUTION INTRAMUSCULAR; INTRAVENOUS at 08:33

## 2019-12-19 ASSESSMENT — PAIN SCALES - GENERAL
PAINLEVEL_OUTOF10: 0

## 2019-12-20 LAB
ALBUMIN SERPL-MCNC: 3 G/DL (ref 3.5–5.2)
ALP BLD-CCNC: 153 U/L (ref 35–104)
ALT SERPL-CCNC: 57 U/L (ref 0–32)
ANION GAP SERPL CALCULATED.3IONS-SCNC: 10 MMOL/L (ref 7–16)
AST SERPL-CCNC: 55 U/L (ref 0–31)
BASOPHILS ABSOLUTE: 0 E9/L (ref 0–0.2)
BASOPHILS RELATIVE PERCENT: 0.2 % (ref 0–2)
BILIRUB SERPL-MCNC: 0.7 MG/DL (ref 0–1.2)
BUN BLDV-MCNC: 15 MG/DL (ref 8–23)
CALCIUM SERPL-MCNC: 9.8 MG/DL (ref 8.6–10.2)
CHLORIDE BLD-SCNC: 108 MMOL/L (ref 98–107)
CO2: 21 MMOL/L (ref 22–29)
CREAT SERPL-MCNC: 1.1 MG/DL (ref 0.5–1)
EOSINOPHILS ABSOLUTE: 0.19 E9/L (ref 0.05–0.5)
EOSINOPHILS RELATIVE PERCENT: 1.7 % (ref 0–6)
GFR AFRICAN AMERICAN: >60
GFR NON-AFRICAN AMERICAN: 50 ML/MIN/1.73
GLUCOSE BLD-MCNC: 114 MG/DL (ref 74–99)
HCT VFR BLD CALC: 34.2 % (ref 34–48)
HEMOGLOBIN: 10.2 G/DL (ref 11.5–15.5)
LYMPHOCYTES ABSOLUTE: 0.67 E9/L (ref 1.5–4)
LYMPHOCYTES RELATIVE PERCENT: 6.1 % (ref 20–42)
MCH RBC QN AUTO: 31.1 PG (ref 26–35)
MCHC RBC AUTO-ENTMCNC: 29.8 % (ref 32–34.5)
MCV RBC AUTO: 104.3 FL (ref 80–99.9)
MONOCYTES ABSOLUTE: 0.9 E9/L (ref 0.1–0.95)
MONOCYTES RELATIVE PERCENT: 7.8 % (ref 2–12)
NEUTROPHILS ABSOLUTE: 9.41 E9/L (ref 1.8–7.3)
NEUTROPHILS RELATIVE PERCENT: 84.3 % (ref 43–80)
PDW BLD-RTO: 13.8 FL (ref 11.5–15)
PLATELET # BLD: 199 E9/L (ref 130–450)
PMV BLD AUTO: 12.4 FL (ref 7–12)
POTASSIUM SERPL-SCNC: 4.4 MMOL/L (ref 3.5–5)
RBC # BLD: 3.28 E12/L (ref 3.5–5.5)
SODIUM BLD-SCNC: 139 MMOL/L (ref 132–146)
TOTAL PROTEIN: 6.3 G/DL (ref 6.4–8.3)
WBC # BLD: 11.2 E9/L (ref 4.5–11.5)

## 2019-12-20 PROCEDURE — 6370000000 HC RX 637 (ALT 250 FOR IP): Performed by: INTERNAL MEDICINE

## 2019-12-20 PROCEDURE — 97110 THERAPEUTIC EXERCISES: CPT

## 2019-12-20 PROCEDURE — 97116 GAIT TRAINING THERAPY: CPT

## 2019-12-20 PROCEDURE — 36415 COLL VENOUS BLD VENIPUNCTURE: CPT

## 2019-12-20 PROCEDURE — 6360000002 HC RX W HCPCS: Performed by: INTERNAL MEDICINE

## 2019-12-20 PROCEDURE — 85025 COMPLETE CBC W/AUTO DIFF WBC: CPT

## 2019-12-20 PROCEDURE — 87799 DETECT AGENT NOS DNA QUANT: CPT

## 2019-12-20 PROCEDURE — 80053 COMPREHEN METABOLIC PANEL: CPT

## 2019-12-20 PROCEDURE — 1200000000 HC SEMI PRIVATE

## 2019-12-20 PROCEDURE — 6370000000 HC RX 637 (ALT 250 FOR IP): Performed by: SPECIALIST

## 2019-12-20 PROCEDURE — 94640 AIRWAY INHALATION TREATMENT: CPT

## 2019-12-20 RX ORDER — ALBUTEROL SULFATE 2.5 MG/3ML
2.5 SOLUTION RESPIRATORY (INHALATION) 4 TIMES DAILY
Qty: 120 EACH | Refills: 3 | Status: SHIPPED | OUTPATIENT
Start: 2019-12-20 | End: 2019-12-21

## 2019-12-20 RX ORDER — CIPROFLOXACIN 500 MG/1
500 TABLET, FILM COATED ORAL EVERY 12 HOURS SCHEDULED
Status: DISCONTINUED | OUTPATIENT
Start: 2019-12-20 | End: 2019-12-22

## 2019-12-20 RX ORDER — CIPROFLOXACIN 500 MG/1
500 TABLET, FILM COATED ORAL EVERY 12 HOURS SCHEDULED
Qty: 28 TABLET | Refills: 0 | Status: SHIPPED | OUTPATIENT
Start: 2019-12-20 | End: 2019-12-23

## 2019-12-20 RX ORDER — DESVENLAFAXINE 50 MG/1
50 TABLET, EXTENDED RELEASE ORAL EVERY EVENING
Qty: 30 TABLET | Refills: 3 | Status: SHIPPED | OUTPATIENT
Start: 2019-12-20

## 2019-12-20 RX ORDER — LACTOBACILLUS RHAMNOSUS GG 10B CELL
1 CAPSULE ORAL EVERY 12 HOURS
Status: DISCONTINUED | OUTPATIENT
Start: 2019-12-20 | End: 2019-12-24 | Stop reason: HOSPADM

## 2019-12-20 RX ORDER — LACTOBACILLUS RHAMNOSUS GG 10B CELL
2 CAPSULE ORAL EVERY 12 HOURS
Qty: 120 CAPSULE | Refills: 0 | Status: SHIPPED | OUTPATIENT
Start: 2019-12-20 | End: 2020-07-23

## 2019-12-20 RX ADMIN — CLOZAPINE 50 MG: 100 TABLET ORAL at 20:24

## 2019-12-20 RX ADMIN — MYCOPHENOLATE MOFETIL 1000 MG: 250 CAPSULE ORAL at 10:01

## 2019-12-20 RX ADMIN — LEVOTHYROXINE SODIUM 50 MCG: 50 TABLET ORAL at 05:24

## 2019-12-20 RX ADMIN — FOLIC ACID 1 MG: 1 TABLET ORAL at 09:02

## 2019-12-20 RX ADMIN — Medication 1 CAPSULE: at 10:06

## 2019-12-20 RX ADMIN — LISINOPRIL 5 MG: 10 TABLET ORAL at 13:48

## 2019-12-20 RX ADMIN — MYCOPHENOLATE MOFETIL 1000 MG: 250 CAPSULE ORAL at 20:31

## 2019-12-20 RX ADMIN — CYCLOSPORINE 100 MG: 25 CAPSULE, GELATIN COATED ORAL at 10:03

## 2019-12-20 RX ADMIN — CIPROFLOXACIN HYDROCHLORIDE 500 MG: 500 TABLET, FILM COATED ORAL at 10:01

## 2019-12-20 RX ADMIN — MYCOPHENOLATE MOFETIL 1000 MG: 250 CAPSULE ORAL at 10:00

## 2019-12-20 RX ADMIN — ENOXAPARIN SODIUM 30 MG: 30 INJECTION SUBCUTANEOUS at 09:03

## 2019-12-20 RX ADMIN — Medication 1 CAPSULE: at 21:29

## 2019-12-20 RX ADMIN — CIPROFLOXACIN HYDROCHLORIDE 500 MG: 500 TABLET, FILM COATED ORAL at 21:29

## 2019-12-20 RX ADMIN — ALBUTEROL SULFATE 2.5 MG: 2.5 SOLUTION RESPIRATORY (INHALATION) at 17:11

## 2019-12-20 RX ADMIN — METOPROLOL SUCCINATE 50 MG: 25 TABLET, EXTENDED RELEASE ORAL at 09:03

## 2019-12-20 RX ADMIN — DESVENLAFAXINE SUCCINATE 50 MG: 50 TABLET, FILM COATED, EXTENDED RELEASE ORAL at 20:26

## 2019-12-20 RX ADMIN — ALBUTEROL SULFATE 2.5 MG: 2.5 SOLUTION RESPIRATORY (INHALATION) at 06:11

## 2019-12-20 RX ADMIN — ALBUTEROL SULFATE 2.5 MG: 2.5 SOLUTION RESPIRATORY (INHALATION) at 14:27

## 2019-12-20 RX ADMIN — SIMVASTATIN 20 MG: 20 TABLET, FILM COATED ORAL at 20:24

## 2019-12-20 RX ADMIN — CYCLOSPORINE 100 MG: 25 CAPSULE, GELATIN COATED ORAL at 20:25

## 2019-12-20 RX ADMIN — PREDNISONE 5 MG: 5 TABLET ORAL at 09:03

## 2019-12-20 RX ADMIN — ALBUTEROL SULFATE 2.5 MG: 2.5 SOLUTION RESPIRATORY (INHALATION) at 09:44

## 2019-12-20 ASSESSMENT — PAIN SCALES - GENERAL
PAINLEVEL_OUTOF10: 0

## 2019-12-21 ENCOUNTER — APPOINTMENT (OUTPATIENT)
Dept: ULTRASOUND IMAGING | Age: 64
DRG: 872 | End: 2019-12-21
Payer: MEDICARE

## 2019-12-21 LAB
LV EF: 37 %
LVEF MODALITY: NORMAL

## 2019-12-21 PROCEDURE — 6370000000 HC RX 637 (ALT 250 FOR IP): Performed by: SPECIALIST

## 2019-12-21 PROCEDURE — 93306 TTE W/DOPPLER COMPLETE: CPT

## 2019-12-21 PROCEDURE — 6360000002 HC RX W HCPCS: Performed by: INTERNAL MEDICINE

## 2019-12-21 PROCEDURE — 93970 EXTREMITY STUDY: CPT

## 2019-12-21 PROCEDURE — 94640 AIRWAY INHALATION TREATMENT: CPT

## 2019-12-21 PROCEDURE — 6370000000 HC RX 637 (ALT 250 FOR IP): Performed by: INTERNAL MEDICINE

## 2019-12-21 PROCEDURE — 1200000000 HC SEMI PRIVATE

## 2019-12-21 RX ORDER — ALBUTEROL SULFATE 2.5 MG/3ML
2.5 SOLUTION RESPIRATORY (INHALATION) 4 TIMES DAILY
Qty: 120 EACH | Refills: 3 | Status: SHIPPED | OUTPATIENT
Start: 2019-12-21 | End: 2020-07-23

## 2019-12-21 RX ADMIN — LISINOPRIL 5 MG: 10 TABLET ORAL at 08:59

## 2019-12-21 RX ADMIN — FOLIC ACID 1 MG: 1 TABLET ORAL at 09:00

## 2019-12-21 RX ADMIN — LEVOTHYROXINE SODIUM 50 MCG: 50 TABLET ORAL at 05:46

## 2019-12-21 RX ADMIN — ENOXAPARIN SODIUM 30 MG: 30 INJECTION SUBCUTANEOUS at 08:59

## 2019-12-21 RX ADMIN — MYCOPHENOLATE MOFETIL 1000 MG: 250 CAPSULE ORAL at 08:59

## 2019-12-21 RX ADMIN — CHOLECALCIFEROL TAB 125 MCG (5000 UNIT) 5000 UNITS: 125 TAB at 08:59

## 2019-12-21 RX ADMIN — ALBUTEROL SULFATE 2.5 MG: 2.5 SOLUTION RESPIRATORY (INHALATION) at 18:36

## 2019-12-21 RX ADMIN — DESVENLAFAXINE SUCCINATE 50 MG: 50 TABLET, FILM COATED, EXTENDED RELEASE ORAL at 22:14

## 2019-12-21 RX ADMIN — CIPROFLOXACIN HYDROCHLORIDE 500 MG: 500 TABLET, FILM COATED ORAL at 22:13

## 2019-12-21 RX ADMIN — CYCLOSPORINE 100 MG: 25 CAPSULE, GELATIN COATED ORAL at 22:13

## 2019-12-21 RX ADMIN — ALBUTEROL SULFATE 2.5 MG: 2.5 SOLUTION RESPIRATORY (INHALATION) at 14:52

## 2019-12-21 RX ADMIN — CLOZAPINE 50 MG: 100 TABLET ORAL at 22:22

## 2019-12-21 RX ADMIN — Medication 1 CAPSULE: at 08:59

## 2019-12-21 RX ADMIN — CYCLOSPORINE 100 MG: 25 CAPSULE, GELATIN COATED ORAL at 08:59

## 2019-12-21 RX ADMIN — SIMVASTATIN 20 MG: 20 TABLET, FILM COATED ORAL at 22:14

## 2019-12-21 RX ADMIN — MYCOPHENOLATE MOFETIL 1000 MG: 250 CAPSULE ORAL at 22:43

## 2019-12-21 RX ADMIN — METOPROLOL SUCCINATE 50 MG: 25 TABLET, EXTENDED RELEASE ORAL at 09:00

## 2019-12-21 RX ADMIN — Medication 1 CAPSULE: at 22:14

## 2019-12-21 RX ADMIN — PREDNISONE 5 MG: 5 TABLET ORAL at 08:59

## 2019-12-21 RX ADMIN — CIPROFLOXACIN HYDROCHLORIDE 500 MG: 500 TABLET, FILM COATED ORAL at 09:46

## 2019-12-21 ASSESSMENT — PAIN SCALES - GENERAL
PAINLEVEL_OUTOF10: 0

## 2019-12-22 LAB
ANION GAP SERPL CALCULATED.3IONS-SCNC: 12 MMOL/L (ref 7–16)
BASOPHILS ABSOLUTE: 0.07 E9/L (ref 0–0.2)
BASOPHILS RELATIVE PERCENT: 0.7 % (ref 0–2)
BK VIRUS COPY: <390 CPY/ML
BK VIRUS DAN, QN PCR: <2.6 LOG
BK VIRUS QUANTITATIVE, PCR: NOT DETECTED
BK VIRUS SOURCE: NORMAL
BUN BLDV-MCNC: 14 MG/DL (ref 8–23)
CALCIUM SERPL-MCNC: 10.4 MG/DL (ref 8.6–10.2)
CHLORIDE BLD-SCNC: 102 MMOL/L (ref 98–107)
CO2: 24 MMOL/L (ref 22–29)
CREAT SERPL-MCNC: 0.9 MG/DL (ref 0.5–1)
EOSINOPHILS ABSOLUTE: 0.2 E9/L (ref 0.05–0.5)
EOSINOPHILS RELATIVE PERCENT: 2.1 % (ref 0–6)
GFR AFRICAN AMERICAN: >60
GFR NON-AFRICAN AMERICAN: >60 ML/MIN/1.73
GLUCOSE BLD-MCNC: 105 MG/DL (ref 74–99)
HCT VFR BLD CALC: 33.2 % (ref 34–48)
HEMOGLOBIN: 10.2 G/DL (ref 11.5–15.5)
IMMATURE GRANULOCYTES #: 0.12 E9/L
IMMATURE GRANULOCYTES %: 1.3 % (ref 0–5)
LYMPHOCYTES ABSOLUTE: 0.86 E9/L (ref 1.5–4)
LYMPHOCYTES RELATIVE PERCENT: 9.1 % (ref 20–42)
MAGNESIUM: 1.5 MG/DL (ref 1.6–2.6)
MAGNESIUM: 2.2 MG/DL (ref 1.6–2.6)
MCH RBC QN AUTO: 30.8 PG (ref 26–35)
MCHC RBC AUTO-ENTMCNC: 30.7 % (ref 32–34.5)
MCV RBC AUTO: 100.3 FL (ref 80–99.9)
MONOCYTES ABSOLUTE: 0.76 E9/L (ref 0.1–0.95)
MONOCYTES RELATIVE PERCENT: 8 % (ref 2–12)
NEUTROPHILS ABSOLUTE: 7.49 E9/L (ref 1.8–7.3)
NEUTROPHILS RELATIVE PERCENT: 78.8 % (ref 43–80)
PDW BLD-RTO: 13.9 FL (ref 11.5–15)
PLATELET # BLD: 227 E9/L (ref 130–450)
PMV BLD AUTO: 12.2 FL (ref 7–12)
POTASSIUM SERPL-SCNC: 3.9 MMOL/L (ref 3.5–5)
RBC # BLD: 3.31 E12/L (ref 3.5–5.5)
SODIUM BLD-SCNC: 138 MMOL/L (ref 132–146)
TSH SERPL DL<=0.05 MIU/L-ACNC: 0.81 UIU/ML (ref 0.27–4.2)
WBC # BLD: 9.5 E9/L (ref 4.5–11.5)

## 2019-12-22 PROCEDURE — 83735 ASSAY OF MAGNESIUM: CPT

## 2019-12-22 PROCEDURE — 1200000000 HC SEMI PRIVATE

## 2019-12-22 PROCEDURE — 80158 DRUG ASSAY CYCLOSPORINE: CPT

## 2019-12-22 PROCEDURE — 6370000000 HC RX 637 (ALT 250 FOR IP): Performed by: SPECIALIST

## 2019-12-22 PROCEDURE — 36415 COLL VENOUS BLD VENIPUNCTURE: CPT

## 2019-12-22 PROCEDURE — 99222 1ST HOSP IP/OBS MODERATE 55: CPT | Performed by: INTERNAL MEDICINE

## 2019-12-22 PROCEDURE — 94640 AIRWAY INHALATION TREATMENT: CPT

## 2019-12-22 PROCEDURE — 97116 GAIT TRAINING THERAPY: CPT

## 2019-12-22 PROCEDURE — 84443 ASSAY THYROID STIM HORMONE: CPT

## 2019-12-22 PROCEDURE — 80048 BASIC METABOLIC PNL TOTAL CA: CPT

## 2019-12-22 PROCEDURE — 6360000002 HC RX W HCPCS: Performed by: INTERNAL MEDICINE

## 2019-12-22 PROCEDURE — 85025 COMPLETE CBC W/AUTO DIFF WBC: CPT

## 2019-12-22 PROCEDURE — 6370000000 HC RX 637 (ALT 250 FOR IP): Performed by: INTERNAL MEDICINE

## 2019-12-22 PROCEDURE — 2580000003 HC RX 258: Performed by: INTERNAL MEDICINE

## 2019-12-22 RX ORDER — MAGNESIUM SULFATE IN WATER 40 MG/ML
2 INJECTION, SOLUTION INTRAVENOUS ONCE
Status: COMPLETED | OUTPATIENT
Start: 2019-12-22 | End: 2019-12-22

## 2019-12-22 RX ORDER — SODIUM CHLORIDE 0.9 % (FLUSH) 0.9 %
10 SYRINGE (ML) INJECTION PRN
Status: DISCONTINUED | OUTPATIENT
Start: 2019-12-22 | End: 2019-12-24 | Stop reason: HOSPADM

## 2019-12-22 RX ADMIN — ALBUTEROL SULFATE 2.5 MG: 2.5 SOLUTION RESPIRATORY (INHALATION) at 14:12

## 2019-12-22 RX ADMIN — ENOXAPARIN SODIUM 30 MG: 30 INJECTION SUBCUTANEOUS at 08:36

## 2019-12-22 RX ADMIN — LISINOPRIL 5 MG: 10 TABLET ORAL at 08:36

## 2019-12-22 RX ADMIN — DOCUSATE SODIUM 100 MG: 100 CAPSULE, LIQUID FILLED ORAL at 21:12

## 2019-12-22 RX ADMIN — CEFEPIME HYDROCHLORIDE 1 G: 1 INJECTION, POWDER, FOR SOLUTION INTRAMUSCULAR; INTRAVENOUS at 16:27

## 2019-12-22 RX ADMIN — Medication 1 CAPSULE: at 21:16

## 2019-12-22 RX ADMIN — ALBUTEROL SULFATE 2.5 MG: 2.5 SOLUTION RESPIRATORY (INHALATION) at 18:01

## 2019-12-22 RX ADMIN — ALBUTEROL SULFATE 2.5 MG: 2.5 SOLUTION RESPIRATORY (INHALATION) at 06:26

## 2019-12-22 RX ADMIN — METOPROLOL SUCCINATE 50 MG: 25 TABLET, EXTENDED RELEASE ORAL at 08:36

## 2019-12-22 RX ADMIN — CYCLOSPORINE 100 MG: 25 CAPSULE, GELATIN COATED ORAL at 21:12

## 2019-12-22 RX ADMIN — MYCOPHENOLATE MOFETIL 1000 MG: 250 CAPSULE ORAL at 21:18

## 2019-12-22 RX ADMIN — PREDNISONE 5 MG: 5 TABLET ORAL at 08:36

## 2019-12-22 RX ADMIN — FOLIC ACID 1 MG: 1 TABLET ORAL at 08:36

## 2019-12-22 RX ADMIN — MYCOPHENOLATE MOFETIL 1000 MG: 250 CAPSULE ORAL at 08:36

## 2019-12-22 RX ADMIN — MAGNESIUM SULFATE HEPTAHYDRATE 2 G: 40 INJECTION, SOLUTION INTRAVENOUS at 10:07

## 2019-12-22 RX ADMIN — DESVENLAFAXINE SUCCINATE 50 MG: 50 TABLET, FILM COATED, EXTENDED RELEASE ORAL at 19:13

## 2019-12-22 RX ADMIN — Medication 1 CAPSULE: at 08:36

## 2019-12-22 RX ADMIN — CYCLOSPORINE 100 MG: 25 CAPSULE, GELATIN COATED ORAL at 08:35

## 2019-12-22 RX ADMIN — LEVOTHYROXINE SODIUM 50 MCG: 50 TABLET ORAL at 06:15

## 2019-12-22 RX ADMIN — CLOZAPINE 50 MG: 100 TABLET ORAL at 21:13

## 2019-12-22 RX ADMIN — CIPROFLOXACIN HYDROCHLORIDE 500 MG: 500 TABLET, FILM COATED ORAL at 08:36

## 2019-12-22 RX ADMIN — SIMVASTATIN 20 MG: 20 TABLET, FILM COATED ORAL at 21:13

## 2019-12-22 ASSESSMENT — PAIN SCALES - GENERAL: PAINLEVEL_OUTOF10: 0

## 2019-12-23 ENCOUNTER — APPOINTMENT (OUTPATIENT)
Dept: INTERVENTIONAL RADIOLOGY/VASCULAR | Age: 64
DRG: 872 | End: 2019-12-23
Payer: MEDICARE

## 2019-12-23 LAB
BLOOD CULTURE, ROUTINE: ABNORMAL
MAGNESIUM: 2 MG/DL (ref 1.6–2.6)
ORGANISM: ABNORMAL
ORGANISM: ABNORMAL

## 2019-12-23 PROCEDURE — 83735 ASSAY OF MAGNESIUM: CPT

## 2019-12-23 PROCEDURE — 6370000000 HC RX 637 (ALT 250 FOR IP): Performed by: INTERNAL MEDICINE

## 2019-12-23 PROCEDURE — 36573 INSJ PICC RS&I 5 YR+: CPT | Performed by: RADIOLOGY

## 2019-12-23 PROCEDURE — 6360000002 HC RX W HCPCS: Performed by: INTERNAL MEDICINE

## 2019-12-23 PROCEDURE — 1200000000 HC SEMI PRIVATE

## 2019-12-23 PROCEDURE — 02HV33Z INSERTION OF INFUSION DEVICE INTO SUPERIOR VENA CAVA, PERCUTANEOUS APPROACH: ICD-10-PCS | Performed by: INTERNAL MEDICINE

## 2019-12-23 PROCEDURE — 97116 GAIT TRAINING THERAPY: CPT

## 2019-12-23 PROCEDURE — 2580000003 HC RX 258: Performed by: INTERNAL MEDICINE

## 2019-12-23 PROCEDURE — 2500000003 HC RX 250 WO HCPCS: Performed by: RADIOLOGY

## 2019-12-23 PROCEDURE — C1751 CATH, INF, PER/CENT/MIDLINE: HCPCS

## 2019-12-23 PROCEDURE — 6370000000 HC RX 637 (ALT 250 FOR IP): Performed by: SPECIALIST

## 2019-12-23 PROCEDURE — 36415 COLL VENOUS BLD VENIPUNCTURE: CPT

## 2019-12-23 PROCEDURE — 94640 AIRWAY INHALATION TREATMENT: CPT

## 2019-12-23 RX ORDER — METOPROLOL SUCCINATE 50 MG/1
50 TABLET, EXTENDED RELEASE ORAL DAILY
Qty: 30 TABLET | Refills: 3 | Status: SHIPPED | OUTPATIENT
Start: 2019-12-23 | End: 2020-04-17

## 2019-12-23 RX ORDER — LANOLIN ALCOHOL/MO/W.PET/CERES
400 CREAM (GRAM) TOPICAL DAILY
Qty: 30 TABLET | Refills: 1 | Status: SHIPPED | OUTPATIENT
Start: 2019-12-23 | End: 2020-07-23

## 2019-12-23 RX ORDER — CIPROFLOXACIN 500 MG/1
500 TABLET, FILM COATED ORAL EVERY 12 HOURS SCHEDULED
Qty: 28 TABLET | Refills: 0 | Status: SHIPPED | OUTPATIENT
Start: 2019-12-23 | End: 2019-12-24 | Stop reason: HOSPADM

## 2019-12-23 RX ORDER — LANOLIN ALCOHOL/MO/W.PET/CERES
400 CREAM (GRAM) TOPICAL DAILY
Status: DISCONTINUED | OUTPATIENT
Start: 2019-12-23 | End: 2019-12-24 | Stop reason: HOSPADM

## 2019-12-23 RX ORDER — HEPARIN SODIUM (PORCINE) LOCK FLUSH IV SOLN 100 UNIT/ML 100 UNIT/ML
100 SOLUTION INTRAVENOUS PRN
Status: DISCONTINUED | OUTPATIENT
Start: 2019-12-23 | End: 2019-12-24 | Stop reason: HOSPADM

## 2019-12-23 RX ORDER — LIDOCAINE HYDROCHLORIDE 10 MG/ML
10 INJECTION, SOLUTION INFILTRATION; PERINEURAL ONCE
Status: DISCONTINUED | OUTPATIENT
Start: 2019-12-23 | End: 2019-12-23 | Stop reason: ALTCHOICE

## 2019-12-23 RX ADMIN — FOLIC ACID 1 MG: 1 TABLET ORAL at 09:40

## 2019-12-23 RX ADMIN — PREDNISONE 5 MG: 5 TABLET ORAL at 09:40

## 2019-12-23 RX ADMIN — LIDOCAINE HYDROCHLORIDE 10 ML: 10 INJECTION, SOLUTION INFILTRATION; PERINEURAL at 13:09

## 2019-12-23 RX ADMIN — MYCOPHENOLATE MOFETIL 1000 MG: 250 CAPSULE ORAL at 11:59

## 2019-12-23 RX ADMIN — MYCOPHENOLATE MOFETIL 1000 MG: 250 CAPSULE ORAL at 20:42

## 2019-12-23 RX ADMIN — ALBUTEROL SULFATE 2.5 MG: 2.5 SOLUTION RESPIRATORY (INHALATION) at 09:07

## 2019-12-23 RX ADMIN — CLOZAPINE 50 MG: 100 TABLET ORAL at 20:42

## 2019-12-23 RX ADMIN — CEFEPIME HYDROCHLORIDE 1 G: 1 INJECTION, POWDER, FOR SOLUTION INTRAMUSCULAR; INTRAVENOUS at 04:01

## 2019-12-23 RX ADMIN — CYCLOSPORINE 100 MG: 25 CAPSULE, GELATIN COATED ORAL at 11:59

## 2019-12-23 RX ADMIN — Medication 1 CAPSULE: at 09:42

## 2019-12-23 RX ADMIN — METOPROLOL SUCCINATE 50 MG: 25 TABLET, EXTENDED RELEASE ORAL at 09:40

## 2019-12-23 RX ADMIN — SIMVASTATIN 20 MG: 20 TABLET, FILM COATED ORAL at 20:42

## 2019-12-23 RX ADMIN — ALBUTEROL SULFATE 2.5 MG: 2.5 SOLUTION RESPIRATORY (INHALATION) at 05:17

## 2019-12-23 RX ADMIN — LEVOTHYROXINE SODIUM 50 MCG: 50 TABLET ORAL at 05:15

## 2019-12-23 RX ADMIN — LISINOPRIL 5 MG: 10 TABLET ORAL at 09:41

## 2019-12-23 RX ADMIN — Medication 1 CAPSULE: at 20:42

## 2019-12-23 RX ADMIN — DESVENLAFAXINE SUCCINATE 50 MG: 50 TABLET, FILM COATED, EXTENDED RELEASE ORAL at 20:42

## 2019-12-23 RX ADMIN — CYCLOSPORINE 100 MG: 25 CAPSULE, GELATIN COATED ORAL at 20:43

## 2019-12-23 RX ADMIN — Medication 400 MG: at 09:40

## 2019-12-23 ASSESSMENT — PAIN SCALES - GENERAL
PAINLEVEL_OUTOF10: 0
PAINLEVEL_OUTOF10: 5
PAINLEVEL_OUTOF10: 0
PAINLEVEL_OUTOF10: 0

## 2019-12-24 VITALS
HEART RATE: 90 BPM | SYSTOLIC BLOOD PRESSURE: 132 MMHG | OXYGEN SATURATION: 94 % | DIASTOLIC BLOOD PRESSURE: 75 MMHG | BODY MASS INDEX: 39.91 KG/M2 | RESPIRATION RATE: 18 BRPM | WEIGHT: 216.9 LBS | TEMPERATURE: 98.2 F | HEIGHT: 62 IN

## 2019-12-24 LAB — CYCLOSPORINE A: 90 NG/ML

## 2019-12-24 PROCEDURE — 6370000000 HC RX 637 (ALT 250 FOR IP): Performed by: INTERNAL MEDICINE

## 2019-12-24 PROCEDURE — 97116 GAIT TRAINING THERAPY: CPT

## 2019-12-24 PROCEDURE — 6370000000 HC RX 637 (ALT 250 FOR IP): Performed by: SPECIALIST

## 2019-12-24 PROCEDURE — 6360000002 HC RX W HCPCS: Performed by: INTERNAL MEDICINE

## 2019-12-24 PROCEDURE — 94640 AIRWAY INHALATION TREATMENT: CPT

## 2019-12-24 PROCEDURE — 36592 COLLECT BLOOD FROM PICC: CPT

## 2019-12-24 RX ADMIN — Medication 400 MG: at 08:12

## 2019-12-24 RX ADMIN — MYCOPHENOLATE MOFETIL 1000 MG: 250 CAPSULE ORAL at 08:12

## 2019-12-24 RX ADMIN — FOLIC ACID 1 MG: 1 TABLET ORAL at 08:12

## 2019-12-24 RX ADMIN — METOPROLOL SUCCINATE 50 MG: 25 TABLET, EXTENDED RELEASE ORAL at 08:12

## 2019-12-24 RX ADMIN — CYCLOSPORINE 100 MG: 25 CAPSULE, GELATIN COATED ORAL at 08:12

## 2019-12-24 RX ADMIN — Medication 1 CAPSULE: at 08:11

## 2019-12-24 RX ADMIN — LEVOTHYROXINE SODIUM 50 MCG: 50 TABLET ORAL at 06:18

## 2019-12-24 RX ADMIN — PREDNISONE 5 MG: 5 TABLET ORAL at 08:12

## 2019-12-24 RX ADMIN — ALBUTEROL SULFATE 2.5 MG: 2.5 SOLUTION RESPIRATORY (INHALATION) at 05:39

## 2019-12-24 RX ADMIN — LISINOPRIL 5 MG: 10 TABLET ORAL at 08:11

## 2019-12-24 ASSESSMENT — PAIN SCALES - GENERAL
PAINLEVEL_OUTOF10: 0
PAINLEVEL_OUTOF10: 0

## 2020-02-05 ENCOUNTER — HOSPITAL ENCOUNTER (OUTPATIENT)
Age: 65
Discharge: HOME | End: 2020-02-07
Payer: MEDICARE

## 2020-02-05 LAB
BILIRUBIN URINE: NEGATIVE
BLOOD, URINE: NEGATIVE
CLARITY: CLEAR
COLOR: YELLOW
GLUCOSE URINE: NEGATIVE MG/DL
KETONES, URINE: NEGATIVE MG/DL
LEUKOCYTE ESTERASE, URINE: NEGATIVE
NITRITE, URINE: NEGATIVE
PH UA: 5.5 (ref 5–9)
PROTEIN UA: NEGATIVE MG/DL
SPECIFIC GRAVITY UA: 1.02 (ref 1–1.03)
UROBILINOGEN, URINE: 1 E.U./DL

## 2020-02-05 PROCEDURE — 87088 URINE BACTERIA CULTURE: CPT

## 2020-02-05 PROCEDURE — 81003 URINALYSIS AUTO W/O SCOPE: CPT

## 2020-02-07 LAB — URINE CULTURE, ROUTINE: NORMAL

## 2020-04-17 RX ORDER — METOPROLOL SUCCINATE 50 MG/1
TABLET, EXTENDED RELEASE ORAL
Qty: 90 TABLET | Refills: 3 | Status: ON HOLD
Start: 2020-04-17 | End: 2022-03-21 | Stop reason: HOSPADM

## 2020-06-02 ENCOUNTER — HOSPITAL ENCOUNTER (OUTPATIENT)
Age: 65
Discharge: HOME | End: 2020-06-04
Payer: MEDICARE

## 2020-06-02 ENCOUNTER — HOSPITAL ENCOUNTER (OUTPATIENT)
Dept: GENERAL RADIOLOGY | Age: 65
Discharge: HOME | End: 2020-06-04
Payer: MEDICARE

## 2020-06-02 PROCEDURE — 71046 X-RAY EXAM CHEST 2 VIEWS: CPT

## 2020-06-03 ENCOUNTER — HOSPITAL ENCOUNTER (OUTPATIENT)
Dept: NUCLEAR MEDICINE | Age: 65
Discharge: HOME | End: 2020-06-03
Payer: MEDICARE

## 2020-06-03 ENCOUNTER — HOSPITAL ENCOUNTER (OUTPATIENT)
Dept: ULTRASOUND IMAGING | Age: 65
Discharge: HOME | End: 2020-06-03
Payer: MEDICARE

## 2020-06-03 PROCEDURE — 78582 LUNG VENTILAT&PERFUS IMAGING: CPT

## 2020-06-03 PROCEDURE — 93971 EXTREMITY STUDY: CPT

## 2020-06-03 PROCEDURE — A9539 TC99M PENTETATE: HCPCS | Performed by: RADIOLOGY

## 2020-06-03 PROCEDURE — 3430000000 HC RX DIAGNOSTIC RADIOPHARMACEUTICAL: Performed by: RADIOLOGY

## 2020-06-03 PROCEDURE — A9540 TC99M MAA: HCPCS | Performed by: RADIOLOGY

## 2020-06-03 RX ORDER — KIT FOR THE PREPARATION OF TECHNETIUM TC 99M PENTETATE 20 MG/1
55 INJECTION, POWDER, LYOPHILIZED, FOR SOLUTION INTRAVENOUS; RESPIRATORY (INHALATION)
Status: COMPLETED | OUTPATIENT
Start: 2020-06-03 | End: 2020-06-03

## 2020-06-03 RX ADMIN — Medication 6 MILLICURIE: at 07:46

## 2020-06-03 RX ADMIN — KIT FOR THE PREPARATION OF TECHNETIUM TC 99M PENTETATE 55 MILLICURIE: 20 INJECTION, POWDER, LYOPHILIZED, FOR SOLUTION INTRAVENOUS; RESPIRATORY (INHALATION) at 07:46

## 2020-07-23 ENCOUNTER — TELEPHONE (OUTPATIENT)
Dept: VASCULAR SURGERY | Age: 65
End: 2020-07-23

## 2020-07-23 ENCOUNTER — OFFICE VISIT (OUTPATIENT)
Dept: CARDIOLOGY CLINIC | Age: 65
End: 2020-07-23
Payer: MEDICARE

## 2020-07-23 VITALS
SYSTOLIC BLOOD PRESSURE: 130 MMHG | BODY MASS INDEX: 39.51 KG/M2 | HEIGHT: 63 IN | DIASTOLIC BLOOD PRESSURE: 72 MMHG | HEART RATE: 87 BPM | WEIGHT: 223 LBS

## 2020-07-23 PROCEDURE — 93000 ELECTROCARDIOGRAM COMPLETE: CPT | Performed by: INTERNAL MEDICINE

## 2020-07-23 PROCEDURE — 3017F COLORECTAL CA SCREEN DOC REV: CPT | Performed by: INTERNAL MEDICINE

## 2020-07-23 PROCEDURE — G8417 CALC BMI ABV UP PARAM F/U: HCPCS | Performed by: INTERNAL MEDICINE

## 2020-07-23 PROCEDURE — 99214 OFFICE O/P EST MOD 30 MIN: CPT | Performed by: INTERNAL MEDICINE

## 2020-07-23 PROCEDURE — G8427 DOCREV CUR MEDS BY ELIG CLIN: HCPCS | Performed by: INTERNAL MEDICINE

## 2020-07-23 PROCEDURE — 1036F TOBACCO NON-USER: CPT | Performed by: INTERNAL MEDICINE

## 2020-07-23 RX ORDER — CYANOCOBALAMIN, ISOPROPYL ALCOHOL 1000MCG/ML
1000 KIT INJECTION
COMMUNITY
End: 2022-05-17 | Stop reason: ALTCHOICE

## 2020-07-23 RX ORDER — ALLOPURINOL 100 MG/1
100 TABLET ORAL DAILY
COMMUNITY

## 2020-07-23 RX ORDER — PREGABALIN 75 MG/1
75 CAPSULE ORAL 2 TIMES DAILY
Status: ON HOLD | COMMUNITY
End: 2021-11-27 | Stop reason: HOSPADM

## 2020-07-23 NOTE — PROGRESS NOTES
Cardiomyopathy     6 month follow-up visit (overdue)-no cardiac complaints-was in hospital in December with UTI          History of Present Illness:       Office Visit for follow up of CMP, VHD, HTN   60 yr pt with HX of CMP, VHD, HTN, PAD came for f/u visit   No hospitalizations or surgeries since last visit   Had edema feet, US +ve for DVT - On Diuretics   Patient is compliant with all medications   Karthikeyan any exertional chest pain or short of breath   No palpitations, dizzy or syncope. Active at home   No orthopnea   Try to watch diet          Past Medical History:   Diagnosis Date    Abnormal EKG     left bundle branch block    Cancer (HCC)     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    Cerebrovascular disease     Clotting disorder ([facility] [address] 1985    thrombophlebitis after c section delivery    Depression     15 years followed by dr Gayle Kapadia Hyperlipidemia     Hypertension     Hyperthyroidism     Peripheral vascular disease ([facility] [address]     Renal failure 2012    renal transplant    Thrombophlebitis     after c section delivery    Thyroid disease             Past Surgical History:   Procedure Laterality Date   [address]    one normal delivery    COLONOSCOPY      DIAGNOSTIC CARDIAC CATH LAB PROCEDURE      normal coronaries and  normal coronaries    DIALYSIS FISTULA CREATION  march and 2012    phase one and two patient will start dialysis when needed   [address]    transfemoral venous bypass grafts    KIDNEY TRANSPLANT  2016   [facility]     [facility]      PARATHYROIDECTOMY  2006    left parathyroid  implant and parathyroidectomy        No family history on file.        Social History     Tobacco Use    Smoking status: Former Smoker     Packs/day: 1.00     Years: 20.00     Pack years: 20.00     Last attempt to quit: 10/1/2015     Years since quittin.8    Smokeless tobacco: Never Used   Substance Use Topics    Alcohol use: No     Comment: 2 cups coffee per day         Review of Systems:  Constitutional: negative for fever and chills, or significant weight loss  HEENT: negative for acute visual symptoms or auditory problems, no dysphagia  Respiratory: negative for cough, wheezing, or hemoptysis  Cardiovascular: negative for chest pain, palpitations, and dyspnea  Gastrointestinal: negative for abdominal pain, diarrhea, nausea and vomiting  Endocrine: Negative for polyuria and polydyspsia  Genitourinary:negative for dysuria and hematuria  Derm: negative for rash and skin lesion(s)  Neurological: negative for tingling, numbness, weakness, seizures and tremors  Endocrine: negative for polydipsia and polyuria  Musculoskeletal: negative for pain or tenderness  Psychiatric: negative for anxiety, depression, or suicidal ideations         O:  COMPLETE PHYSICAL EXAM:       /72 (Site: Right Upper Arm, Position: Sitting, Cuff Size: Large Adult)   Pulse 87   Ht 5' 3\" (1.6 m)   Wt 223 lb (101.2 kg)   BMI 39.50 kg/m²       General:   Patient alert, comfortable, no distress. Appears stated age. HEENT:    Pupils equal, no icterus, no nasal drainage, tongue moist.   Neck:              No masses, Thyroid not palpable. No elevated JVD, No carotid bruit. Chest:   Normal configuration, non tender. Lungs:   Clear to auscultation bilaterally, few scattered rhonchi. Cardiovascular:  Regular rhythm, 1/6 systolic murmur, No S3, PMI at 5th ICS, no palpable thrills,    Abdomen:  Soft, Non tender, Bowel sounds normal, no pulsatile abdominal aorta, no palpable masses. Extremities:  +o edema. Distal pulses palpable. No cyanosis, no clubbing. Skin:   Good turgor, warm and dry, no cyanosis. Musculoskeletal: No joint swelling or deformity. Neuro:   Cranial nerves grossly intact; No focal neurologic deficit. Psych:   Alert, good mood and effect.      REVIEW OF DIAGNOSTIC TESTS:        Electrocardiogram: Reviewed      Technically discussed. Monitor BP and heart rates. Above recommendations discussed with her. All questions answered about cardiac diagnoses and cardiac medications. Continue current medications. Compliance with medications and f/u with all physicians discussed. Risk factor modification based on risk profile discussed. Call if any exertional chest pain, short of breath, dizzy or palpitations   Follow up in 12 months or earlier if needed.          [facility]  [address], [place], [address] [facility]  [phone]

## 2020-09-03 ENCOUNTER — TELEPHONE (OUTPATIENT)
Dept: VASCULAR SURGERY | Age: 65
End: 2020-09-03

## 2020-09-03 ENCOUNTER — OFFICE VISIT (OUTPATIENT)
Dept: VASCULAR SURGERY | Age: 65
End: 2020-09-03
Payer: MEDICARE

## 2020-09-03 VITALS — WEIGHT: 215 LBS | HEIGHT: 62 IN | BODY MASS INDEX: 39.56 KG/M2 | RESPIRATION RATE: 16 BRPM

## 2020-09-03 PROBLEM — M79.89 LEG SWELLING: Status: ACTIVE | Noted: 2020-09-03

## 2020-09-03 PROBLEM — M10.9 ACUTE GOUT INVOLVING TOE OF RIGHT FOOT: Status: ACTIVE | Noted: 2020-09-03

## 2020-09-03 PROBLEM — I89.0 LYMPHEDEMA OF BOTH LOWER EXTREMITIES: Status: ACTIVE | Noted: 2020-09-03

## 2020-09-03 PROBLEM — A41.9 SEPSIS (HCC): Status: RESOLVED | Noted: 2019-12-17 | Resolved: 2020-09-03

## 2020-09-03 PROCEDURE — G8417 CALC BMI ABV UP PARAM F/U: HCPCS | Performed by: SURGERY

## 2020-09-03 PROCEDURE — 99204 OFFICE O/P NEW MOD 45 MIN: CPT | Performed by: SURGERY

## 2020-09-03 PROCEDURE — 3017F COLORECTAL CA SCREEN DOC REV: CPT | Performed by: SURGERY

## 2020-09-03 PROCEDURE — 1036F TOBACCO NON-USER: CPT | Performed by: SURGERY

## 2020-09-03 PROCEDURE — G8427 DOCREV CUR MEDS BY ELIG CLIN: HCPCS | Performed by: SURGERY

## 2020-09-03 NOTE — PROGRESS NOTES
Chief Complaint:   Chief Complaint   Patient presents with    Consultation     new pt. bilateral leg pain and swelling         HPI: Patient came to the office by herself, for evaluation of vascular status of both legs, history of swelling of both legs on and off for the last few years at least, have gotten worse during the last 6 months, underwent cardiac evaluation, does have left ventricle dysfunction, ejection fraction of 35%, referred by her cardiologist for further evaluation    Patient denies any history of deep vein thrombosis    Patient does give history of kidney disease, post cadaver transplant in 2015, follows up with her nephrologist, Dr. Dino Cash locally and her transplant nephrologist from [facility]    Patient also complains of redness of the right big toe of 2 days duration, on further questioning noted, that her uric acid level is elevated and patient was started on allopurinol by her transplant nephrologist from the [facility], currently also taking prednisone      Patient denies any focal lateralizing neurological symptoms like loss of speech, vision or loss of function of extremity    Patient can walk a few blocks slowly, and denies any symptoms of rest pain    Allergies   Allergen Reactions    Macrodantin [Nitrofurantoin Macrocrystal]     Sulfa Antibiotics        Current Outpatient Medications   Medication Sig Dispense Refill    pregabalin (LYRICA) 75 MG capsule Take 75 mg by mouth 2 times daily.       allopurinol (ZYLOPRIM) 100 MG tablet Take 100 mg by mouth daily      Cyanocobalamin (B-12 COMPLIANCE INJECTION) 1000 MCG/ML KIT Inject as directed monthly      metoprolol succinate (TOPROL XL) 50 MG extended release tablet take 1 tablet by mouth once daily 90 tablet 3    desvenlafaxine succinate (PRISTIQ) 50 MG TB24 extended release tablet Take 1 tablet by mouth every evening 30 tablet 3    cloZAPine (CLOZARIL) 50 MG tablet Take 50 mg by mouth nightly      cycloSPORINE (SANDIMMUNE) 100 MG capsule Take 100 mg by mouth 2 times daily      predniSONE (DELTASONE) 5 MG tablet Take 5 mg by mouth daily      furosemide (LASIX) 20 MG tablet Take 20 mg by mouth daily as needed (Swelling)      lisinopril (PRINIVIL;ZESTRIL) 5 MG tablet Take 5 mg by mouth daily      Cholecalciferol (VITAMIN D3) 5000 UNITS TABS Take 5,000 Units by mouth Five times weekly Monday, Tuesday, Wednesday, Thursday and Friday NIGHTLY      folic acid (FOLVITE) 1 MG tablet Take 1 mg by mouth daily      docusate sodium (COLACE) 100 MG capsule Take 100 mg by mouth 2 times daily      mycophenolate (CELLCEPT) 500 MG tablet Take 1,000 mg by mouth 2 times daily      simvastatin (ZOCOR) 20 MG tablet Take 20 mg by mouth nightly.  levothyroxine (SYNTHROID) 50 MCG tablet Take 50 mcg by mouth daily. No current facility-administered medications for this visit.         Past Medical History:   Diagnosis Date    Abnormal EKG     left bundle branch block    Acute gout involving toe of right foot 9/3/2020    Acute gout involving toe of right foot 9/3/2020    Cancer (HCC)     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    Cerebrovascular disease     Clotting disorder ([address] 1985    thrombophlebitis after c section delivery    Depression     15 years followed by dr Ermelinda Conte Hyperlipidemia     Hypertension     Hyperthyroidism     Leg swelling 9/3/2020    Lymphedema of both lower extremities 9/3/2020    Peripheral vascular disease ([address]     Renal failure 11/2012    renal transplant    Thrombophlebitis     after c section delivery    Thyroid disease        Past Surgical History:   Procedure Laterality Date   [address]    one normal delivery    COLONOSCOPY      DIAGNOSTIC CARDIAC CATH LAB PROCEDURE  2006    normal coronaries and 1996 normal coronaries    DIALYSIS FISTULA CREATION  march and july 2012    phase one and two patient will start dialysis when needed   [address]     transfemoral venous bypass grafts    KIDNEY TRANSPLANT  2016    KIDNEY TRANSPLANT  2015    KIDNEY TRANSPLANT  2015    PARATHYROIDECTOMY  2006    left parathyroid  implant and parathyroidectomy       No family history on file.     Social History     Socioeconomic History    Marital status:      Spouse name: Not on file    Number of children: Not on file    Years of education: Not on file    Highest education level: Not on file   Occupational History    Not on file   Social Needs    Financial resource strain: Not on file    Food insecurity     Worry: Not on file     Inability: Not on file    Transportation needs     Medical: Not on file     Non-medical: Not on file   Tobacco Use    Smoking status: Former Smoker     Packs/day: 1.00     Years: 20.00     Pack years: 20.00     Last attempt to quit: 10/1/2015     Years since quittin.9    Smokeless tobacco: Never Used   Substance and Sexual Activity    Alcohol use: No     Comment: 2 cups coffee per day    Drug use: No    Sexual activity: Not on file   Lifestyle    Physical activity     Days per week: Not on file     Minutes per session: Not on file    Stress: Not on file   Relationships    Social connections     Talks on phone: Not on file     Gets together: Not on file     Attends Religion service: Not on file     Active member of club or organization: Not on file     Attends meetings of clubs or organizations: Not on file     Relationship status: Not on file    Intimate partner violence     Fear of current or ex partner: Not on file     Emotionally abused: Not on file     Physically abused: Not on file     Forced sexual activity: Not on file   Other Topics Concern    Not on file   Social History Narrative    Not on file       Review of Systems:  Skin:  No abnormal pigmentation or rash  Eyes:  No blurring, diplopia or vision loss  Ears/Nose/Throat:  No hearing loss or vertigo  Respiratory:  No cough, pleuritic chest pain, dyspnea, or wheezing. Cardiovascular: No angina, palpitations . Coronary artery disease, left ventricle dysfunction, ejection fraction of 35%, hypertension, hyperlipidemia  Gastrointestinal:  No nausea or vomiting; no abdominal pain or rectal bleeding  Musculoskeletal:  No arthritis or weakness. Neurologic:  No paralysis, paresis, paresthesia, seizures or headaches  Hematologic/Lymphatic/Immunologic:  No anemia, abnormal bleeding/bruising, fever, chills or night sweats. Endocrine:  No heat or cold intolerance. No polyphagia, polydipsia or polyuria. Hypothyroidism  Nephrology: Status post kidney transplant 2015, following up with Dr. Rafiq wright and her nephrologist, transplant specialist at the [facility],  Physical Exam:  General appearance:  Alert, awake, oriented x 3. No distress. Skin:  Warm and dry  Head:  Normocephalic. No masses, lesions or tenderness  Eyes:  Conjunctivae appear normal; PERRL  Ears:  External ears normal  Nose/Sinuses:  Septum midline, mucosa normal; no drainage  Oropharynx:  Clear, no exudate noted  Neck:  No jugular venous distention, lymphadenopathy or thyromegaly. No evidence of carotid bruit  Lungs:  Clear to ausculation bilaterally. No rhonchi, crackles, wheezes  Heart:  Regular rate and rhythm. No rub or murmur  Abdomen:  Soft, non-tender. No masses, organomegaly. Musculoskeletal : No joint effusions, tenderness swelling    Neuro: Speech is intact. Moving all extremities. No focal motor or sensory deficits      Extremities:  Both feet are warm to touch. The color of both feet is normal.    Patient does have moderate edema both legs mainly below the knee, clinically suspicious for lymphedema, without varicose veins, no cellulitis    Pulses Right  Left    Brachial 3 3    Radial    3=normal   Femoral 2 2  2=diminished   Popliteal    1=barely palpable   Dorsalis pedis 2-3 2-3  0=absent   Posterior tibial    4=aneurysmal             Other pertinent information:1.  The past medical records were reviewed. 2.  Cardiology office notes and the 2D echo of the heart were reviewed, ejection fraction of 35%    3. The last CMP revealed albumin level of 3    4. Venous ultrasound of the left leg done more than 4 months ago, revealed no evidence of deep vein thrombosis    Assessment:    1. S/p cadaver renal transplant    2. Leg swelling    3. Lymphedema of both lower extremities    4. Acute gout involving toe of right foot, unspecified cause              Plan:       I had a long detailed discussion with patient, all options, risks benefits and alternatives were explained, patient recommended repeat venous ultrasound of both legs, and also initiate lymphedema therapy and once maximum improvement is noted, have the legs letter for compression device and to call repair if any increase in the pain or swelling of the legs    Patient also was instructed to contact her transplant nephrologist, who has started on allopurinol because of elevated uric acid level that she has pain or redness of the right big toe clinically suspicious for gout regarding further options for treatment          Patient was instructed to continue walking program and to call if any worsening of symptoms and to call if any focal lateralizing neurological symptoms like loss of speech, vision or loss of function of extremity. All the questions were answered.       Orders Placed This Encounter   Procedures    US DUP LOWER EXTREMITIES BILATERAL VENOUS    PT lymphedema evaluation and treatment             Indicated follow-up: Call PRN      CC to Dr. Kevon Myles

## 2020-09-17 ENCOUNTER — TELEPHONE (OUTPATIENT)
Dept: VASCULAR SURGERY | Age: 65
End: 2020-09-17

## 2020-09-17 NOTE — TELEPHONE ENCOUNTER
Left message there is no blood clot, the test is normal.          ----- Message from Marquita Cespedes MD sent at 9/16/2020  4:35 PM EDT -----  Please notify patient, that the venous ultrasound study, normal, no evidence of deep vein thrombosis

## 2020-10-01 ENCOUNTER — HOSPITAL ENCOUNTER (EMERGENCY)
Age: 65
Discharge: HOME | End: 2020-10-01
Payer: MEDICARE

## 2020-10-01 VITALS
TEMPERATURE: 98 F | OXYGEN SATURATION: 93 % | HEART RATE: 76 BPM | DIASTOLIC BLOOD PRESSURE: 65 MMHG | BODY MASS INDEX: 45.18 KG/M2 | HEIGHT: 62 IN | WEIGHT: 245.5 LBS | RESPIRATION RATE: 18 BRPM | SYSTOLIC BLOOD PRESSURE: 135 MMHG

## 2020-10-01 PROCEDURE — 99285 EMERGENCY DEPT VISIT HI MDM: CPT

## 2020-10-01 PROCEDURE — 4500000002 HC ER NO CHARGE

## 2020-10-01 PROCEDURE — 99284 EMERGENCY DEPT VISIT MOD MDM: CPT

## 2020-10-01 RX ORDER — ORPHENADRINE CITRATE 100 MG/1
100 TABLET, EXTENDED RELEASE ORAL 2 TIMES DAILY
Qty: 6 TABLET | Refills: 0 | Status: SHIPPED | OUTPATIENT
Start: 2020-10-01 | End: 2020-10-04
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
## 2020-10-01 NOTE — ED NOTES
Moves all ext without difficulty. resp easy. Hand grasp and pedal pushes equal and strong.       Yoselyn Sheikh RN  10/01/20 6408

## 2020-10-01 NOTE — ED NOTES
Bed: H2  Expected date:   Expected time:   Means of arrival:   Comments:     Mandy Moore RN  10/01/20 8676

## 2021-08-16 ENCOUNTER — OFFICE VISIT (OUTPATIENT)
Dept: CARDIOLOGY CLINIC | Age: 66
End: 2021-08-16
Payer: MEDICARE

## 2021-08-16 VITALS
HEART RATE: 82 BPM | WEIGHT: 229 LBS | HEIGHT: 62 IN | DIASTOLIC BLOOD PRESSURE: 74 MMHG | SYSTOLIC BLOOD PRESSURE: 110 MMHG | BODY MASS INDEX: 42.14 KG/M2

## 2021-08-16 DIAGNOSIS — I73.9 PAD (PERIPHERAL ARTERY DISEASE) (HCC): ICD-10-CM

## 2021-08-16 DIAGNOSIS — E78.5 DYSLIPIDEMIA: ICD-10-CM

## 2021-08-16 DIAGNOSIS — I34.0 NONRHEUMATIC MITRAL VALVE REGURGITATION: ICD-10-CM

## 2021-08-16 DIAGNOSIS — I10 ESSENTIAL HYPERTENSION: ICD-10-CM

## 2021-08-16 DIAGNOSIS — I42.8 NONISCHEMIC CARDIOMYOPATHY (HCC): Primary | ICD-10-CM

## 2021-08-16 DIAGNOSIS — Z94.0 S/P CADAVER RENAL TRANSPLANT: ICD-10-CM

## 2021-08-16 PROCEDURE — G8427 DOCREV CUR MEDS BY ELIG CLIN: HCPCS | Performed by: INTERNAL MEDICINE

## 2021-08-16 PROCEDURE — 1123F ACP DISCUSS/DSCN MKR DOCD: CPT | Performed by: INTERNAL MEDICINE

## 2021-08-16 PROCEDURE — 93000 ELECTROCARDIOGRAM COMPLETE: CPT | Performed by: INTERNAL MEDICINE

## 2021-08-16 PROCEDURE — 1036F TOBACCO NON-USER: CPT | Performed by: INTERNAL MEDICINE

## 2021-08-16 PROCEDURE — 1090F PRES/ABSN URINE INCON ASSESS: CPT | Performed by: INTERNAL MEDICINE

## 2021-08-16 PROCEDURE — G8400 PT W/DXA NO RESULTS DOC: HCPCS | Performed by: INTERNAL MEDICINE

## 2021-08-16 PROCEDURE — 4040F PNEUMOC VAC/ADMIN/RCVD: CPT | Performed by: INTERNAL MEDICINE

## 2021-08-16 PROCEDURE — 3017F COLORECTAL CA SCREEN DOC REV: CPT | Performed by: INTERNAL MEDICINE

## 2021-08-16 PROCEDURE — G8417 CALC BMI ABV UP PARAM F/U: HCPCS | Performed by: INTERNAL MEDICINE

## 2021-08-16 PROCEDURE — 99214 OFFICE O/P EST MOD 30 MIN: CPT | Performed by: INTERNAL MEDICINE

## 2021-08-16 NOTE — PROGRESS NOTES
OFFICE VISIT     PRIMARY CARE PHYSICIAN:      Ramez Peter MD       ALLERGIES / SENSITIVITIES:        Allergies   Allergen Reactions    Macrodantin [Nitrofurantoin Macrocrystal]     Sulfa Antibiotics           REVIEWED MEDICATIONS:        Current Outpatient Medications:     pregabalin (LYRICA) 75 MG capsule, Take 75 mg by mouth 2 times daily. , Disp: , Rfl:     allopurinol (ZYLOPRIM) 100 MG tablet, Take 100 mg by mouth daily, Disp: , Rfl:     Cyanocobalamin (B-12 COMPLIANCE INJECTION) 1000 MCG/ML KIT, Inject as directed monthly, Disp: , Rfl:     metoprolol succinate (TOPROL XL) 50 MG extended release tablet, take 1 tablet by mouth once daily, Disp: 90 tablet, Rfl: 3    desvenlafaxine succinate (PRISTIQ) 50 MG TB24 extended release tablet, Take 1 tablet by mouth every evening, Disp: 30 tablet, Rfl: 3    cloZAPine (CLOZARIL) 50 MG tablet, Take 50 mg by mouth nightly, Disp: , Rfl:     cycloSPORINE (SANDIMMUNE) 100 MG capsule, Take 100 mg by mouth 2 times daily, Disp: , Rfl:     predniSONE (DELTASONE) 5 MG tablet, Take 5 mg by mouth daily, Disp: , Rfl:     furosemide (LASIX) 20 MG tablet, Take 20 mg by mouth daily as needed (Swelling), Disp: , Rfl:     lisinopril (PRINIVIL;ZESTRIL) 5 MG tablet, Take 5 mg by mouth daily, Disp: , Rfl:     folic acid (FOLVITE) 1 MG tablet, Take 1 mg by mouth daily, Disp: , Rfl:     docusate sodium (COLACE) 100 MG capsule, Take 100 mg by mouth 2 times daily, Disp: , Rfl:     mycophenolate (CELLCEPT) 500 MG tablet, Take 1,000 mg by mouth 2 times daily, Disp: , Rfl:     simvastatin (ZOCOR) 20 MG tablet, Take 20 mg by mouth nightly.  , Disp: , Rfl:     levothyroxine (SYNTHROID) 50 MCG tablet, Take 50 mcg by mouth daily. , Disp: , Rfl:       S: REASON FOR VISIT:       Chief Complaint   Patient presents with    Cardiomyopathy     annual   No cardiac complaints. No hospitalization.           History of Present Illness:       Office Visit for follow up of CMP, VHD, HTN 60 yr pt with HX of CMP, VHD, HTN, PAD came for f/u visit        Seen by Vascular in Sep 2020   No hospitalizations or surgeries since last visit   Patient is compliant with all medications   Karthikeyan any exertional chest pain or short of breath   No palpitations, dizzy or syncope. Active at home   No orthopnea   Try to watch diet          Past Medical History:   Diagnosis Date    Abnormal EKG     left bundle branch block    Acute gout involving toe of right foot 9/3/2020    Acute gout involving toe of right foot 9/3/2020    Cancer ([facility]     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    Cerebrovascular disease     Clotting disorder ([facility] [address] 1985    thrombophlebitis after c section delivery    Depression     15 years followed by dr Clarisse Montanez Hyperlipidemia     Hypertension     Hyperthyroidism     Leg swelling 9/3/2020    Lymphedema of both lower extremities 9/3/2020    Peripheral vascular disease ([facility]     Renal failure 2012    renal transplant    Thrombophlebitis     after c section delivery    Thyroid disease             Past Surgical History:   Procedure Laterality Date   [address]    one normal delivery    COLONOSCOPY      DIAGNOSTIC CARDIAC CATH LAB PROCEDURE      normal coronaries and  normal coronaries    DIALYSIS FISTULA CREATION  march and 2012    phase one and two patient will start dialysis when needed   [address]    transfemoral venous bypass grafts    KIDNEY TRANSPLANT  2016   [facility]     [facility]      PARATHYROIDECTOMY  2006    left parathyroid  implant and parathyroidectomy        History reviewed. No pertinent family history.        Social History     Tobacco Use    Smoking status: Former Smoker     Packs/day: 1.00     Years: 20.00     Pack years: 20.00     Quit date: 10/1/2015     Years since quittin.8    Smokeless tobacco: Never Used   Substance Use Topics    Alcohol use: No     Comment: 2 today for cardiomyopathy. Diagnoses and all orders for this visit:      Nonischemic cardiomyopathy ([facility] - EF 35-38% by Echo 12/2019 - Compensated;  Continue BB, ACE-I  -     EKG 12 lead     Essential hypertension: Controlled on current medicaitons     LBBB (left bundle branch block): Chronic     Peripheral vascular disease ([facility] [address]  s/p ? Femoral bypass in ?1985-88, Dr Keenan Oliveros with Vascular - Dr Rosita De     ESRD (end stage renal disease) [facility]) due to Lithium Toxicity - Follows with Dr Shelly Olea     Non morbid obesity due to excess calories - Diet, exercise and weight loss discussed.     S/p cadaver renal transplant in 2015 in [address] [place] [address]; - Follows with Dr Shelly Olea     Preventive Cardiology: Low cholesterol diet, regular exercise as tolerate, and gradual weight loss discussed. Monitor BP and heart rates. Above recommendations discussed with her. All questions answered about cardiac diagnoses and cardiac medications. Continue current medications. Compliance with medications and f/u with all physicians discussed. Risk factor modification based on risk profile discussed. Call if any exertional chest pain, short of breath, dizzy or palpitations   Follow up in 12 months or earlier if needed.          [facility]  [address], [place], [address] [facility]  [phone]

## 2021-11-03 LAB
BASOPHILS ABSOLUTE: NORMAL
BASOPHILS RELATIVE PERCENT: NORMAL
EOSINOPHILS ABSOLUTE: NORMAL
EOSINOPHILS RELATIVE PERCENT: NORMAL
HCT VFR BLD CALC: NORMAL %
HEMOGLOBIN: NORMAL
LYMPHOCYTES ABSOLUTE: NORMAL
LYMPHOCYTES RELATIVE PERCENT: NORMAL
MCH RBC QN AUTO: NORMAL PG
MCHC RBC AUTO-ENTMCNC: NORMAL G/DL
MCV RBC AUTO: NORMAL FL
MONOCYTES ABSOLUTE: NORMAL
MONOCYTES RELATIVE PERCENT: NORMAL
NEUTROPHILS ABSOLUTE: NORMAL
NEUTROPHILS RELATIVE PERCENT: NORMAL
PLATELET # BLD: NORMAL 10*3/UL
PMV BLD AUTO: NORMAL FL
RBC # BLD: NORMAL 10*6/UL
WBC # BLD: NORMAL 10*3/UL

## 2021-11-17 ENCOUNTER — APPOINTMENT (OUTPATIENT)
Dept: ULTRASOUND IMAGING | Age: 66
DRG: 871 | End: 2021-11-17
Payer: MEDICARE

## 2021-11-17 ENCOUNTER — APPOINTMENT (OUTPATIENT)
Dept: CT IMAGING | Age: 66
DRG: 871 | End: 2021-11-17
Payer: MEDICARE

## 2021-11-17 ENCOUNTER — HOSPITAL ENCOUNTER (INPATIENT)
Age: 66
LOS: 10 days | Discharge: NURSING, REHAB OR LONG-TERM CARE | DRG: 871 | End: 2021-11-27
Attending: EMERGENCY MEDICINE | Admitting: INTERNAL MEDICINE
Payer: MEDICARE

## 2021-11-17 ENCOUNTER — APPOINTMENT (OUTPATIENT)
Dept: GENERAL RADIOLOGY | Age: 66
DRG: 871 | End: 2021-11-17
Payer: MEDICARE

## 2021-11-17 DIAGNOSIS — A41.9 SEPSIS, DUE TO UNSPECIFIED ORGANISM, UNSPECIFIED WHETHER ACUTE ORGAN DYSFUNCTION PRESENT (HCC): Primary | ICD-10-CM

## 2021-11-17 DIAGNOSIS — N39.0 URINARY TRACT INFECTION WITHOUT HEMATURIA, SITE UNSPECIFIED: ICD-10-CM

## 2021-11-17 LAB
ALBUMIN SERPL-MCNC: 3.4 G/DL (ref 3.5–5.2)
ALBUMIN SERPL-MCNC: 3.7 G/DL (ref 3.5–5.2)
ALP BLD-CCNC: 62 U/L (ref 35–104)
ALP BLD-CCNC: 73 U/L (ref 35–104)
ALT SERPL-CCNC: 6 U/L (ref 0–32)
ALT SERPL-CCNC: 7 U/L (ref 0–32)
ANION GAP SERPL CALCULATED.3IONS-SCNC: 12 MMOL/L (ref 7–16)
ANION GAP SERPL CALCULATED.3IONS-SCNC: 13 MMOL/L (ref 7–16)
APTT: 20 SEC (ref 24.5–35.1)
AST SERPL-CCNC: 15 U/L (ref 0–31)
AST SERPL-CCNC: 20 U/L (ref 0–31)
BACTERIA: ABNORMAL /HPF
BASOPHILS ABSOLUTE: 0 E9/L (ref 0–0.2)
BASOPHILS RELATIVE PERCENT: 0 % (ref 0–2)
BILIRUB SERPL-MCNC: 0.6 MG/DL (ref 0–1.2)
BILIRUB SERPL-MCNC: 0.7 MG/DL (ref 0–1.2)
BILIRUBIN URINE: ABNORMAL
BLOOD, URINE: ABNORMAL
BUN BLDV-MCNC: 17 MG/DL (ref 6–23)
BUN BLDV-MCNC: 18 MG/DL (ref 6–23)
C-REACTIVE PROTEIN: 34.6 MG/DL (ref 0–0.4)
CALCIUM SERPL-MCNC: 9.6 MG/DL (ref 8.6–10.2)
CALCIUM SERPL-MCNC: 9.7 MG/DL (ref 8.6–10.2)
CHLORIDE BLD-SCNC: 101 MMOL/L (ref 98–107)
CHLORIDE BLD-SCNC: 105 MMOL/L (ref 98–107)
CLARITY: ABNORMAL
CO2: 24 MMOL/L (ref 22–29)
CO2: 25 MMOL/L (ref 22–29)
COLOR: YELLOW
CREAT SERPL-MCNC: 1.7 MG/DL (ref 0.5–1)
CREAT SERPL-MCNC: 1.7 MG/DL (ref 0.5–1)
D DIMER: 836 NG/ML DDU
EKG ATRIAL RATE: 107 BPM
EKG P AXIS: 34 DEGREES
EKG P-R INTERVAL: 154 MS
EKG Q-T INTERVAL: 362 MS
EKG QRS DURATION: 142 MS
EKG QTC CALCULATION (BAZETT): 483 MS
EKG R AXIS: -54 DEGREES
EKG T AXIS: 106 DEGREES
EKG VENTRICULAR RATE: 107 BPM
EOSINOPHILS ABSOLUTE: 0 E9/L (ref 0.05–0.5)
EOSINOPHILS RELATIVE PERCENT: 0 % (ref 0–6)
EPITHELIAL CELLS, UA: ABNORMAL /HPF
GFR AFRICAN AMERICAN: 36
GFR AFRICAN AMERICAN: 36
GFR NON-AFRICAN AMERICAN: 30 ML/MIN/1.73
GFR NON-AFRICAN AMERICAN: 30 ML/MIN/1.73
GLUCOSE BLD-MCNC: 107 MG/DL (ref 74–99)
GLUCOSE BLD-MCNC: 87 MG/DL (ref 74–99)
GLUCOSE URINE: NEGATIVE MG/DL
HBA1C MFR BLD: 5.7 % (ref 4–5.6)
HCT VFR BLD CALC: 46.7 % (ref 34–48)
HEMOGLOBIN: 13.8 G/DL (ref 11.5–15.5)
INR BLD: 1.1
KETONES, URINE: NEGATIVE MG/DL
LACTATE DEHYDROGENASE: 299 U/L (ref 135–214)
LACTIC ACID, SEPSIS: 1.8 MMOL/L (ref 0.5–1.9)
LACTIC ACID, SEPSIS: 2.1 MMOL/L (ref 0.5–1.9)
LEUKOCYTE ESTERASE, URINE: ABNORMAL
LIPASE: 19 U/L (ref 13–60)
LYMPHOCYTES ABSOLUTE: 0.14 E9/L (ref 1.5–4)
LYMPHOCYTES RELATIVE PERCENT: 1 % (ref 20–42)
MCH RBC QN AUTO: 31.7 PG (ref 26–35)
MCHC RBC AUTO-ENTMCNC: 29.6 % (ref 32–34.5)
MCV RBC AUTO: 107.1 FL (ref 80–99.9)
MONOCYTES ABSOLUTE: 0.54 E9/L (ref 0.1–0.95)
MONOCYTES RELATIVE PERCENT: 4 % (ref 2–12)
NEUTROPHILS ABSOLUTE: 12.83 E9/L (ref 1.8–7.3)
NEUTROPHILS RELATIVE PERCENT: 95 % (ref 43–80)
NITRITE, URINE: NEGATIVE
PDW BLD-RTO: 14.5 FL (ref 11.5–15)
PH UA: 6 (ref 5–9)
PLATELET # BLD: 71 E9/L (ref 130–450)
PLATELET CONFIRMATION: NORMAL
PMV BLD AUTO: 13.8 FL (ref 7–12)
POTASSIUM SERPL-SCNC: 4.2 MMOL/L (ref 3.5–5)
POTASSIUM SERPL-SCNC: 4.5 MMOL/L (ref 3.5–5)
PRO-BNP: 2666 PG/ML (ref 0–125)
PROCALCITONIN: 2.12 NG/ML (ref 0–0.08)
PROTEIN UA: 100 MG/DL
PROTHROMBIN TIME: 12.9 SEC (ref 9.3–12.4)
RBC # BLD: 4.36 E12/L (ref 3.5–5.5)
RBC # BLD: NORMAL 10*6/UL
RBC UA: ABNORMAL /HPF (ref 0–2)
REASON FOR REJECTION: NORMAL
REJECTED TEST: NORMAL
SARS-COV-2, NAAT: NOT DETECTED
SODIUM BLD-SCNC: 138 MMOL/L (ref 132–146)
SODIUM BLD-SCNC: 142 MMOL/L (ref 132–146)
SPECIFIC GRAVITY UA: 1.01 (ref 1–1.03)
T4 FREE: 1.08 NG/DL (ref 0.93–1.7)
TOTAL PROTEIN: 6.1 G/DL (ref 6.4–8.3)
TOTAL PROTEIN: 6.2 G/DL (ref 6.4–8.3)
TROPONIN, HIGH SENSITIVITY: 52 NG/L (ref 0–9)
TROPONIN, HIGH SENSITIVITY: 88 NG/L (ref 0–9)
TSH SERPL DL<=0.05 MIU/L-ACNC: 0.38 UIU/ML (ref 0.27–4.2)
UROBILINOGEN, URINE: 1 E.U./DL
WBC # BLD: 13.5 E9/L (ref 4.5–11.5)
WBC UA: >20 /HPF (ref 0–5)

## 2021-11-17 PROCEDURE — 83690 ASSAY OF LIPASE: CPT

## 2021-11-17 PROCEDURE — 83605 ASSAY OF LACTIC ACID: CPT

## 2021-11-17 PROCEDURE — 6370000000 HC RX 637 (ALT 250 FOR IP): Performed by: NURSE PRACTITIONER

## 2021-11-17 PROCEDURE — 0202U NFCT DS 22 TRGT SARS-COV-2: CPT

## 2021-11-17 PROCEDURE — 83880 ASSAY OF NATRIURETIC PEPTIDE: CPT

## 2021-11-17 PROCEDURE — 85730 THROMBOPLASTIN TIME PARTIAL: CPT

## 2021-11-17 PROCEDURE — 99223 1ST HOSP IP/OBS HIGH 75: CPT | Performed by: INTERNAL MEDICINE

## 2021-11-17 PROCEDURE — 85378 FIBRIN DEGRADE SEMIQUANT: CPT

## 2021-11-17 PROCEDURE — 87088 URINE BACTERIA CULTURE: CPT

## 2021-11-17 PROCEDURE — 6360000002 HC RX W HCPCS: Performed by: EMERGENCY MEDICINE

## 2021-11-17 PROCEDURE — 36415 COLL VENOUS BLD VENIPUNCTURE: CPT

## 2021-11-17 PROCEDURE — 99285 EMERGENCY DEPT VISIT HI MDM: CPT

## 2021-11-17 PROCEDURE — 87077 CULTURE AEROBIC IDENTIFY: CPT

## 2021-11-17 PROCEDURE — 2700000000 HC OXYGEN THERAPY PER DAY

## 2021-11-17 PROCEDURE — 6360000002 HC RX W HCPCS: Performed by: NURSE PRACTITIONER

## 2021-11-17 PROCEDURE — 71046 X-RAY EXAM CHEST 2 VIEWS: CPT

## 2021-11-17 PROCEDURE — 87150 DNA/RNA AMPLIFIED PROBE: CPT

## 2021-11-17 PROCEDURE — 83615 LACTATE (LD) (LDH) ENZYME: CPT

## 2021-11-17 PROCEDURE — 86140 C-REACTIVE PROTEIN: CPT

## 2021-11-17 PROCEDURE — 87186 SC STD MICRODIL/AGAR DIL: CPT

## 2021-11-17 PROCEDURE — 71045 X-RAY EXAM CHEST 1 VIEW: CPT

## 2021-11-17 PROCEDURE — 93005 ELECTROCARDIOGRAM TRACING: CPT | Performed by: EMERGENCY MEDICINE

## 2021-11-17 PROCEDURE — 87081 CULTURE SCREEN ONLY: CPT

## 2021-11-17 PROCEDURE — 93970 EXTREMITY STUDY: CPT

## 2021-11-17 PROCEDURE — 76770 US EXAM ABDO BACK WALL COMP: CPT

## 2021-11-17 PROCEDURE — 2580000003 HC RX 258: Performed by: EMERGENCY MEDICINE

## 2021-11-17 PROCEDURE — 94640 AIRWAY INHALATION TREATMENT: CPT

## 2021-11-17 PROCEDURE — 2580000003 HC RX 258: Performed by: NURSE PRACTITIONER

## 2021-11-17 PROCEDURE — 6370000000 HC RX 637 (ALT 250 FOR IP): Performed by: EMERGENCY MEDICINE

## 2021-11-17 PROCEDURE — 74176 CT ABD & PELVIS W/O CONTRAST: CPT

## 2021-11-17 PROCEDURE — 80053 COMPREHEN METABOLIC PANEL: CPT

## 2021-11-17 PROCEDURE — 84484 ASSAY OF TROPONIN QUANT: CPT

## 2021-11-17 PROCEDURE — 81001 URINALYSIS AUTO W/SCOPE: CPT

## 2021-11-17 PROCEDURE — 83036 HEMOGLOBIN GLYCOSYLATED A1C: CPT

## 2021-11-17 PROCEDURE — 84443 ASSAY THYROID STIM HORMONE: CPT

## 2021-11-17 PROCEDURE — 84439 ASSAY OF FREE THYROXINE: CPT

## 2021-11-17 PROCEDURE — 85610 PROTHROMBIN TIME: CPT

## 2021-11-17 PROCEDURE — 1200000000 HC SEMI PRIVATE

## 2021-11-17 PROCEDURE — APPSS45 APP SPLIT SHARED TIME 31-45 MINUTES: Performed by: NURSE PRACTITIONER

## 2021-11-17 PROCEDURE — 85025 COMPLETE CBC W/AUTO DIFF WBC: CPT

## 2021-11-17 PROCEDURE — 84145 PROCALCITONIN (PCT): CPT

## 2021-11-17 PROCEDURE — 87040 BLOOD CULTURE FOR BACTERIA: CPT

## 2021-11-17 PROCEDURE — 87635 SARS-COV-2 COVID-19 AMP PRB: CPT

## 2021-11-17 RX ORDER — SODIUM CHLORIDE 0.9 % (FLUSH) 0.9 %
5-40 SYRINGE (ML) INJECTION PRN
Status: DISCONTINUED | OUTPATIENT
Start: 2021-11-17 | End: 2021-11-20 | Stop reason: SDUPTHER

## 2021-11-17 RX ORDER — LEVOTHYROXINE SODIUM 0.05 MG/1
50 TABLET ORAL DAILY
Status: DISCONTINUED | OUTPATIENT
Start: 2021-11-17 | End: 2021-11-27 | Stop reason: HOSPADM

## 2021-11-17 RX ORDER — MYCOPHENOLATE MOFETIL 500 MG/1
1000 TABLET ORAL 2 TIMES DAILY
Status: DISCONTINUED | OUTPATIENT
Start: 2021-11-17 | End: 2021-11-17 | Stop reason: CLARIF

## 2021-11-17 RX ORDER — METOPROLOL SUCCINATE 50 MG/1
50 TABLET, EXTENDED RELEASE ORAL DAILY
Status: DISCONTINUED | OUTPATIENT
Start: 2021-11-17 | End: 2021-11-27 | Stop reason: HOSPADM

## 2021-11-17 RX ORDER — ONDANSETRON 2 MG/ML
4 INJECTION INTRAMUSCULAR; INTRAVENOUS EVERY 6 HOURS PRN
Status: DISCONTINUED | OUTPATIENT
Start: 2021-11-17 | End: 2021-11-27 | Stop reason: HOSPADM

## 2021-11-17 RX ORDER — ATORVASTATIN CALCIUM 10 MG/1
10 TABLET, FILM COATED ORAL DAILY
Status: DISCONTINUED | OUTPATIENT
Start: 2021-11-17 | End: 2021-11-27 | Stop reason: HOSPADM

## 2021-11-17 RX ORDER — FOLIC ACID 1 MG/1
1 TABLET ORAL DAILY
Status: DISCONTINUED | OUTPATIENT
Start: 2021-11-17 | End: 2021-11-27 | Stop reason: HOSPADM

## 2021-11-17 RX ORDER — SODIUM CHLORIDE 9 MG/ML
25 INJECTION, SOLUTION INTRAVENOUS EVERY 12 HOURS
Status: DISCONTINUED | OUTPATIENT
Start: 2021-11-17 | End: 2021-11-17

## 2021-11-17 RX ORDER — ALLOPURINOL 100 MG/1
100 TABLET ORAL DAILY
Status: DISCONTINUED | OUTPATIENT
Start: 2021-11-17 | End: 2021-11-27 | Stop reason: HOSPADM

## 2021-11-17 RX ORDER — ACETAMINOPHEN 325 MG/1
650 TABLET ORAL EVERY 6 HOURS PRN
Status: DISCONTINUED | OUTPATIENT
Start: 2021-11-17 | End: 2021-11-27 | Stop reason: HOSPADM

## 2021-11-17 RX ORDER — SODIUM CHLORIDE 9 MG/ML
25 INJECTION, SOLUTION INTRAVENOUS PRN
Status: DISCONTINUED | OUTPATIENT
Start: 2021-11-17 | End: 2021-11-20 | Stop reason: SDUPTHER

## 2021-11-17 RX ORDER — DESVENLAFAXINE 50 MG/1
50 TABLET, EXTENDED RELEASE ORAL EVERY EVENING
Status: DISCONTINUED | OUTPATIENT
Start: 2021-11-17 | End: 2021-11-27 | Stop reason: HOSPADM

## 2021-11-17 RX ORDER — PREDNISONE 1 MG/1
5 TABLET ORAL DAILY
Status: DISCONTINUED | OUTPATIENT
Start: 2021-11-17 | End: 2021-11-27 | Stop reason: HOSPADM

## 2021-11-17 RX ORDER — HEPARIN SODIUM 5000 [USP'U]/ML
5000 INJECTION, SOLUTION INTRAVENOUS; SUBCUTANEOUS EVERY 8 HOURS SCHEDULED
Status: DISCONTINUED | OUTPATIENT
Start: 2021-11-17 | End: 2021-11-27 | Stop reason: HOSPADM

## 2021-11-17 RX ORDER — DOCUSATE SODIUM 100 MG/1
100 CAPSULE, LIQUID FILLED ORAL 2 TIMES DAILY
Status: DISCONTINUED | OUTPATIENT
Start: 2021-11-17 | End: 2021-11-27 | Stop reason: HOSPADM

## 2021-11-17 RX ORDER — LISINOPRIL 5 MG/1
5 TABLET ORAL DAILY
Status: DISCONTINUED | OUTPATIENT
Start: 2021-11-17 | End: 2021-11-27 | Stop reason: HOSPADM

## 2021-11-17 RX ORDER — ACETAMINOPHEN 650 MG/1
650 SUPPOSITORY RECTAL EVERY 6 HOURS PRN
Status: DISCONTINUED | OUTPATIENT
Start: 2021-11-17 | End: 2021-11-27 | Stop reason: HOSPADM

## 2021-11-17 RX ORDER — CLOZAPINE 25 MG/1
50 TABLET ORAL NIGHTLY
Status: DISCONTINUED | OUTPATIENT
Start: 2021-11-17 | End: 2021-11-27 | Stop reason: HOSPADM

## 2021-11-17 RX ORDER — PREGABALIN 75 MG/1
75 CAPSULE ORAL 2 TIMES DAILY
Status: DISCONTINUED | OUTPATIENT
Start: 2021-11-17 | End: 2021-11-21

## 2021-11-17 RX ORDER — POLYETHYLENE GLYCOL 3350 17 G/17G
17 POWDER, FOR SOLUTION ORAL DAILY PRN
Status: DISCONTINUED | OUTPATIENT
Start: 2021-11-17 | End: 2021-11-27 | Stop reason: HOSPADM

## 2021-11-17 RX ORDER — ACETAMINOPHEN 325 MG/1
650 TABLET ORAL ONCE
Status: COMPLETED | OUTPATIENT
Start: 2021-11-17 | End: 2021-11-17

## 2021-11-17 RX ORDER — SODIUM CHLORIDE 9 MG/ML
25 INJECTION, SOLUTION INTRAVENOUS EVERY 12 HOURS
Status: DISCONTINUED | OUTPATIENT
Start: 2021-11-17 | End: 2021-11-21

## 2021-11-17 RX ORDER — MYCOPHENOLATE MOFETIL 250 MG/1
1000 CAPSULE ORAL 2 TIMES DAILY
Status: DISCONTINUED | OUTPATIENT
Start: 2021-11-17 | End: 2021-11-23

## 2021-11-17 RX ORDER — IPRATROPIUM BROMIDE AND ALBUTEROL SULFATE 2.5; .5 MG/3ML; MG/3ML
1 SOLUTION RESPIRATORY (INHALATION)
Status: DISCONTINUED | OUTPATIENT
Start: 2021-11-17 | End: 2021-11-27 | Stop reason: HOSPADM

## 2021-11-17 RX ORDER — SODIUM CHLORIDE 0.9 % (FLUSH) 0.9 %
5-40 SYRINGE (ML) INJECTION EVERY 12 HOURS SCHEDULED
Status: DISCONTINUED | OUTPATIENT
Start: 2021-11-17 | End: 2021-11-20 | Stop reason: SDUPTHER

## 2021-11-17 RX ORDER — ONDANSETRON 4 MG/1
4 TABLET, ORALLY DISINTEGRATING ORAL EVERY 8 HOURS PRN
Status: DISCONTINUED | OUTPATIENT
Start: 2021-11-17 | End: 2021-11-27 | Stop reason: HOSPADM

## 2021-11-17 RX ORDER — CYCLOSPORINE 25 MG/1
100 CAPSULE, LIQUID FILLED ORAL 2 TIMES DAILY
Status: DISCONTINUED | OUTPATIENT
Start: 2021-11-17 | End: 2021-11-27 | Stop reason: HOSPADM

## 2021-11-17 RX ORDER — FUROSEMIDE 10 MG/ML
20 INJECTION INTRAMUSCULAR; INTRAVENOUS ONCE
Status: COMPLETED | OUTPATIENT
Start: 2021-11-17 | End: 2021-11-17

## 2021-11-17 RX ADMIN — DESVENLAFAXINE 50 MG: 50 TABLET, EXTENDED RELEASE ORAL at 18:05

## 2021-11-17 RX ADMIN — DOCUSATE SODIUM 100 MG: 100 CAPSULE ORAL at 20:23

## 2021-11-17 RX ADMIN — WATER 2000 MG: 1 INJECTION INTRAMUSCULAR; INTRAVENOUS; SUBCUTANEOUS at 08:37

## 2021-11-17 RX ADMIN — ALLOPURINOL 100 MG: 100 TABLET ORAL at 14:31

## 2021-11-17 RX ADMIN — PREGABALIN 75 MG: 75 CAPSULE ORAL at 14:30

## 2021-11-17 RX ADMIN — ACETAMINOPHEN 650 MG: 325 TABLET ORAL at 08:39

## 2021-11-17 RX ADMIN — SODIUM CHLORIDE 25 ML: 9 INJECTION, SOLUTION INTRAVENOUS at 18:21

## 2021-11-17 RX ADMIN — METOPROLOL SUCCINATE 50 MG: 50 TABLET, EXTENDED RELEASE ORAL at 14:30

## 2021-11-17 RX ADMIN — PREGABALIN 75 MG: 75 CAPSULE ORAL at 20:23

## 2021-11-17 RX ADMIN — LEVOTHYROXINE SODIUM 50 MCG: 0.05 TABLET ORAL at 14:30

## 2021-11-17 RX ADMIN — PREDNISONE 5 MG: 5 TABLET ORAL at 14:30

## 2021-11-17 RX ADMIN — ACETAMINOPHEN 650 MG: 325 TABLET ORAL at 20:05

## 2021-11-17 RX ADMIN — CEFEPIME HYDROCHLORIDE 1000 MG: 1 INJECTION, POWDER, FOR SOLUTION INTRAMUSCULAR; INTRAVENOUS at 14:00

## 2021-11-17 RX ADMIN — CYCLOSPORINE 100 MG: 25 CAPSULE, LIQUID FILLED ORAL at 20:23

## 2021-11-17 RX ADMIN — MYCOPHENOLATE MOFETIL 1000 MG: 250 CAPSULE ORAL at 20:23

## 2021-11-17 RX ADMIN — HEPARIN SODIUM 5000 UNITS: 5000 INJECTION INTRAVENOUS; SUBCUTANEOUS at 20:24

## 2021-11-17 RX ADMIN — DOCUSATE SODIUM 100 MG: 100 CAPSULE ORAL at 14:30

## 2021-11-17 RX ADMIN — CLOZAPINE 50 MG: 25 TABLET ORAL at 20:23

## 2021-11-17 RX ADMIN — HEPARIN SODIUM 5000 UNITS: 5000 INJECTION INTRAVENOUS; SUBCUTANEOUS at 14:32

## 2021-11-17 RX ADMIN — MYCOPHENOLATE MOFETIL 1000 MG: 250 CAPSULE ORAL at 15:07

## 2021-11-17 RX ADMIN — FUROSEMIDE 20 MG: 10 INJECTION, SOLUTION INTRAMUSCULAR; INTRAVENOUS at 14:32

## 2021-11-17 RX ADMIN — LISINOPRIL 5 MG: 5 TABLET ORAL at 14:30

## 2021-11-17 RX ADMIN — ATORVASTATIN CALCIUM 10 MG: 10 TABLET, FILM COATED ORAL at 14:30

## 2021-11-17 RX ADMIN — FOLIC ACID 1 MG: 1 TABLET ORAL at 14:31

## 2021-11-17 RX ADMIN — CYCLOSPORINE 100 MG: 25 CAPSULE, LIQUID FILLED ORAL at 15:07

## 2021-11-17 RX ADMIN — IPRATROPIUM BROMIDE AND ALBUTEROL SULFATE 1 AMPULE: .5; 2.5 SOLUTION RESPIRATORY (INHALATION) at 18:23

## 2021-11-17 ASSESSMENT — ENCOUNTER SYMPTOMS
SHORTNESS OF BREATH: 0
DIARRHEA: 0
NAUSEA: 0
VOMITING: 0
EYE DISCHARGE: 0
EYE PAIN: 0
WHEEZING: 0
SINUS PRESSURE: 0
EYE REDNESS: 0
ABDOMINAL DISTENTION: 0
COUGH: 0
SORE THROAT: 0
BACK PAIN: 0

## 2021-11-17 ASSESSMENT — PAIN SCALES - GENERAL
PAINLEVEL_OUTOF10: 8
PAINLEVEL_OUTOF10: 2
PAINLEVEL_OUTOF10: 0
PAINLEVEL_OUTOF10: 0
PAINLEVEL_OUTOF10: 7
PAINLEVEL_OUTOF10: 0

## 2021-11-17 ASSESSMENT — PAIN DESCRIPTION - FREQUENCY: FREQUENCY: CONTINUOUS

## 2021-11-17 ASSESSMENT — PAIN DESCRIPTION - DESCRIPTORS: DESCRIPTORS: ACHING;BURNING;CONSTANT;DULL

## 2021-11-17 ASSESSMENT — PAIN DESCRIPTION - LOCATION: LOCATION: BACK

## 2021-11-17 ASSESSMENT — PAIN DESCRIPTION - PAIN TYPE: TYPE: CHRONIC PAIN;ACUTE PAIN

## 2021-11-17 ASSESSMENT — PAIN DESCRIPTION - ORIENTATION: ORIENTATION: MID;LOWER

## 2021-11-17 ASSESSMENT — PAIN - FUNCTIONAL ASSESSMENT: PAIN_FUNCTIONAL_ASSESSMENT: PREVENTS OR INTERFERES WITH ALL ACTIVE AND SOME PASSIVE ACTIVITIES

## 2021-11-17 ASSESSMENT — PAIN DESCRIPTION - PROGRESSION: CLINICAL_PROGRESSION: NOT CHANGED

## 2021-11-17 NOTE — PROGRESS NOTES
Pt wanted her cousin Lisa called at [phone]. Spoke with Lisa and she stated she takes care of this pt and her mom. Lisa may come by tomorrow during visiting hours.

## 2021-11-17 NOTE — ED PROVIDER NOTES
Patient is a 72 y/o female who presents to the ED via EMS with generalized weakness, dysuria and urinary frequency. Patient states \"I think I have a UTI. \" She states that she has had urinary frequency and burning with urination. Yesterday, she felt hot and had chills. She states that she slid off of the couch and had to call EMS because she could not get herself up off of the floor. She also was found to be hypoxic at 88% at home. She denies any cough. She denies any abdominal pain, nausea, vomiting or diarrhea. Review of Systems   Constitutional: Positive for chills, fatigue and fever. HENT: Negative for ear pain, sinus pressure and sore throat. Eyes: Negative for pain, discharge and redness. Respiratory: Negative for cough, shortness of breath and wheezing. Cardiovascular: Negative for chest pain. Gastrointestinal: Negative for abdominal distention, diarrhea, nausea and vomiting. Genitourinary: Positive for dysuria and frequency. Musculoskeletal: Negative for arthralgias and back pain. Skin: Negative for rash and wound. Neurological: Positive for weakness. Negative for headaches. Hematological: Negative for adenopathy. All other systems reviewed and are negative. Physical Exam  Vitals and nursing note reviewed. Constitutional:       General: She is not in acute distress. Appearance: She is obese. HENT:      Head: Normocephalic and atraumatic. Right Ear: External ear normal.      Left Ear: External ear normal.      Nose: Nose normal.      Mouth/Throat:      Mouth: Mucous membranes are moist.   Eyes:      Conjunctiva/sclera: Conjunctivae normal.      Pupils: Pupils are equal, round, and reactive to light. Cardiovascular:      Rate and Rhythm: Regular rhythm. Tachycardia present. Heart sounds: No murmur heard. Pulmonary:      Effort: Pulmonary effort is normal. No respiratory distress. Breath sounds: Normal breath sounds. No stridor.  No wheezing, rhonchi or rales. Abdominal:      General: Bowel sounds are normal. There is no distension. Palpations: Abdomen is soft. Tenderness: There is no abdominal tenderness. There is no guarding. Musculoskeletal:         General: Normal range of motion. Cervical back: Normal range of motion and neck supple. Skin:     General: Skin is warm and dry. Neurological:      Mental Status: She is alert and oriented to person, place, and time. Procedures     [facility]               --------------------------------------------- PAST HISTORY ---------------------------------------------  Past Medical History:  has a past medical history of Abnormal EKG, Acute gout involving toe of right foot, Acute gout involving toe of right foot, Cancer ([facility] [address] Cerebrovascular disease, Clotting disorder ([facility] [address] Depression, Hyperlipidemia, Hypertension, Hyperthyroidism, Leg swelling, Lymphedema of both lower extremities, Peripheral vascular disease ([facility] [facility] Renal failure, Thrombophlebitis, and Thyroid disease. Past Surgical History:  has a past surgical history that includes Diagnostic Cardiac Cath Lab Procedure (); femoral bypass (); parathyroidectomy (); Colonoscopy;  section (); Dialysis fistula creation (march and 2012); Kidney transplant (); Kidney transplant (); and Kidney transplant (). Social History:  reports that she quit smoking about 6 years ago. She has a 20.00 pack-year smoking history. She has never used smokeless tobacco. She reports that she does not drink alcohol and does not use drugs. Family History: family history is not on file. The patients home medications have been reviewed.     Allergies: Macrodantin [nitrofurantoin macrocrystal] and Sulfa antibiotics    -------------------------------------------------- RESULTS -------------------------------------------------    LABS:  Results for orders placed or performed during the hospital encounter of 21 COVID-19, Rapid    Specimen: Nasopharyngeal Swab   Result Value Ref Range    SARS-CoV-2, NAAT Not Detected Not Detected   Lactate, Sepsis   Result Value Ref Range    Lactic Acid, Sepsis 2.1 (H) 0.5 - 1.9 mmol/L   CBC auto differential   Result Value Ref Range    WBC 13.5 (H) 4.5 - 11.5 E9/L    RBC 4.36 3.50 - 5.50 E12/L    Hemoglobin 13.8 11.5 - 15.5 g/dL    Hematocrit 46.7 34.0 - 48.0 %    .1 (H) 80.0 - 99.9 fL    MCH 31.7 26.0 - 35.0 pg    MCHC 29.6 (L) 32.0 - 34.5 %    RDW 14.5 11.5 - 15.0 fL    Platelets 71 (L) 410 - 450 E9/L    MPV 13.8 (H) 7.0 - 12.0 fL    Neutrophils % 95.0 (H) 43.0 - 80.0 %    Lymphocytes % 1.0 (L) 20.0 - 42.0 %    Monocytes % 4.0 2.0 - 12.0 %    Eosinophils % 0.0 0.0 - 6.0 %    Basophils % 0.0 0.0 - 2.0 %    Neutrophils Absolute 12.83 (H) 1.80 - 7.30 E9/L    Lymphocytes Absolute 0.14 (L) 1.50 - 4.00 E9/L    Monocytes Absolute 0.54 0.10 - 0.95 E9/L    Eosinophils Absolute 0.00 (L) 0.05 - 0.50 E9/L    Basophils Absolute 0.00 0.00 - 0.20 E9/L    RBC Morphology Normal    Urinalysis   Result Value Ref Range    Color, UA Yellow Straw/Yellow    Clarity, UA CLOUDY (A) Clear    Glucose, Ur Negative Negative mg/dL    Bilirubin Urine SMALL (A) Negative    Ketones, Urine Negative Negative mg/dL    Specific Gravity, UA 1.015 1.005 - 1.030    Blood, Urine LARGE (A) Negative    pH, UA 6.0 5.0 - 9.0    Protein,  (A) Negative mg/dL    Urobilinogen, Urine 1.0 <2.0 E.U./dL    Nitrite, Urine Negative Negative    Leukocyte Esterase, Urine MODERATE (A) Negative   APTT   Result Value Ref Range    aPTT 20.0 (L) 24.5 - 35.1 sec   Protime-INR   Result Value Ref Range    Protime 12.9 (H) 9.3 - 12.4 sec    INR 1.1    Lipase   Result Value Ref Range    Lipase 19 13 - 60 U/L   Brain Natriuretic Peptide   Result Value Ref Range    Pro-BNP 2,666 (H) 0 - 125 pg/mL   Platelet Confirmation   Result Value Ref Range    Platelet Confirmation CONFIRMED    SPECIMEN REJECTION   Result Value Ref Range    Rejected Test CMPX,TRP5     Reason for Rejection see below    EKG 12 lead   Result Value Ref Range    Ventricular Rate 107 BPM    Atrial Rate 107 BPM    P-R Interval 154 ms    QRS Duration 142 ms    Q-T Interval 362 ms    QTc Calculation (Bazett) 483 ms    P Axis 34 degrees    R Axis -54 degrees    T Axis 106 degrees       RADIOLOGY:  XR CHEST 1 VIEW   Final Result   Mild cardiomegaly. EKG:  This EKG is signed and interpreted by me. Rate: 107  Rhythm: Sinus  Interpretation: non-specific EKG, left bundle branch block and sinus tachycardia  Comparison: no previous EKG available      ------------------------- NURSING NOTES AND VITALS REVIEWED ---------------------------  Date / Time Roomed:  11/17/2021  4:50 AM  ED Bed Assignment:  07/07    The nursing notes within the ED encounter and vital signs as below have been reviewed. Patient Vitals for the past 24 hrs:   BP Temp Pulse Resp SpO2 Weight   11/17/21 0452 (!) 151/85 100.9 °F (38.3 °C) 107 18 99 % 200 lb (90.7 kg)       Oxygen Saturation Interpretation: Normal    ------------------------------------------ PROGRESS NOTES ------------------------------------------  Re-evaluation(s):  Time: 0800. Patients symptoms show no change  Repeat physical examination is not changed    Counseling:  I have spoken with the patient and discussed todays results, in addition to providing specific details for the plan of care and counseling regarding the diagnosis and prognosis. Their questions are answered at this time and they are agreeable with the plan of admission.    --------------------------------- ADDITIONAL PROVIDER NOTES ---------------------------------  Consultations:  Time: 8459. Spoke with Dr. Cinthya Murcia. Discussed case. They will admit the patient.   This patient's ED course included: a personal history and physicial examination, re-evaluation prior to disposition, multiple bedside re-evaluations and IV medications    This patient has remained hemodynamically stable during their ED course. Diagnosis:  1. Sepsis, due to unspecified organism, unspecified whether acute organ dysfunction present ([facility] [address]    2. Urinary tract infection without hematuria, site unspecified        Disposition:  Patient's disposition: Admit to med/surg floor  Patient's condition is stable.          [facility],   11/17/21 8864

## 2021-11-17 NOTE — H&P
[address] [place], [place] [address]ist Group   History and Physical      CHIEF COMPLAINT:  Weakness, dysuria and urinary frequency    History of Present Illness:  73 y.o. female with a history of hypertension, hyperthyroidism, renal failure s/p transplant, lymphedema, thrombophlebitis presents with weakness, dysuria and urinary frequency. She reports that her symptoms began yesterday. She was feeling hot and cold and was having urinary frequency with burning. She said that she slid off of her couch onto the floor around 3 am and was too weak to get up. She had to call EMS for assistance. Upon arrival she was found to be hypoxic with a pulse ox of 88% on room air. She was placed on oxygen and her oxygen sat improved. She was tachycardic with a heart rate of 107 and had a temp of 100.9-101.7. Lab work revealed leukocytosis with a WBC of 13.5. Urinalysis was positive for a UTI. She has a history of a kidney transplant in 2015 and follows with Dr. Maru Delacruz at [facility] and locally with Dr. Fide Powell. She has a history of a Klebsiella UTI in 2019 and was seen by ID. Informant(s) for H&P: Patient    REVIEW OF SYSTEMS:  no n/v, ha, vision/hearing changes, wt changes, other open skin lesions, diarrhea, constipation, unless noted in HPI. Complete ROS performed with the patient and is otherwise negative.       PMH:  Past Medical History:   Diagnosis Date    Abnormal EKG     left bundle branch block    Acute gout involving toe of right foot 9/3/2020    Acute gout involving toe of right foot 9/3/2020    Cancer (HCC)     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    Cerebrovascular disease     Clotting disorder ([address] 1985    thrombophlebitis after c section delivery    Depression     15 years followed by dr Fredrick Queen    Hyperlipidemia     Hypertension     Hyperthyroidism     Leg swelling 9/3/2020    Lymphedema of both lower extremities 9/3/2020    Peripheral vascular disease ([address]     Renal failure 2012    renal transplant    Thrombophlebitis     after c section delivery    Thyroid disease        Surgical History:  Past Surgical History:   Procedure Laterality Date     SECTION  1985    one normal delivery    COLONOSCOPY      DIAGNOSTIC CARDIAC CATH LAB PROCEDURE      normal coronaries and 1996 normal coronaries    DIALYSIS FISTULA CREATION  march and 2012    phase one and two patient will start dialysis when needed   [address]    transfemoral venous bypass grafts    KIDNEY TRANSPLANT  2016   [facility]     [facility]  2015    PARATHYROIDECTOMY  2006    left parathyroid  implant and parathyroidectomy       Medications Prior to Admission:    Prior to Admission medications    Medication Sig Start Date End Date Taking? Authorizing Provider   pregabalin (LYRICA) 75 MG capsule Take 75 mg by mouth 2 times daily.    Yes Historical Provider, MD   allopurinol (ZYLOPRIM) 100 MG tablet Take 100 mg by mouth daily   Yes Historical Provider, MD   Cyanocobalamin (B-12 COMPLIANCE INJECTION) 1000 MCG/ML KIT Inject as directed monthly   Yes Historical Provider, MD   metoprolol succinate (TOPROL XL) 50 MG extended release tablet take 1 tablet by mouth once daily 20  Yes Dennis Lundberg MD   desvenlafaxine succinate (PRISTIQ) 50 MG TB24 extended release tablet Take 1 tablet by mouth every evening 19  Yes Jessica Altman MD   cloZAPine (CLOZARIL) 50 MG tablet Take 50 mg by mouth nightly   Yes Historical Provider, MD   cycloSPORINE (SANDIMMUNE) 100 MG capsule Take 100 mg by mouth 2 times daily   Yes Historical Provider, MD   predniSONE (DELTASONE) 5 MG tablet Take 5 mg by mouth daily   Yes Historical Provider, MD   lisinopril (PRINIVIL;ZESTRIL) 5 MG tablet Take 5 mg by mouth daily   Yes Historical Provider, MD   folic acid (FOLVITE) 1 MG tablet Take 1 mg by mouth daily   Yes Historical Provider, MD   docusate sodium (COLACE) 100 MG capsule Take 100 mg by mouth 2 times daily   Yes Historical Provider, MD   mycophenolate (CELLCEPT) 500 MG tablet Take 1,000 mg by mouth 2 times daily   Yes Historical Provider, MD   simvastatin (ZOCOR) 20 MG tablet Take 20 mg by mouth nightly. Yes Historical Provider, MD   levothyroxine (SYNTHROID) 50 MCG tablet Take 50 mcg by mouth daily. Yes Historical Provider, MD   furosemide (LASIX) 20 MG tablet Take 20 mg by mouth daily as needed (Swelling)    Historical Provider, MD       Allergies:    Macrodantin [nitrofurantoin macrocrystal] and Sulfa antibiotics    Social History:    reports that she quit smoking about 6 years ago. She has a 20.00 pack-year smoking history. She has never used smokeless tobacco. She reports that she does not drink alcohol and does not use drugs. Family History:   family history includes Dementia in her father; Diabetes in her father and mother.      PHYSICAL EXAM:  Vitals:  BP (!) 121/58   Pulse 102   Temp 97.7 °F (36.5 °C) (Oral)   Resp 20   Ht 5' 2\" (1.575 m)   Wt 227 lb 3.2 oz (103.1 kg) Comment: bed scale  SpO2 96%   BMI 41.56 kg/m²     General Appearance: alert and oriented to person, place and time and in no acute distress  Skin: warm and dry  Head: normocephalic and atraumatic  Eyes: pupils equal, round, and reactive to light, extraocular eye movements intact, conjunctivae normal  Neck: neck supple and non tender without mass   Pulmonary/Chest: clear to auscultation bilaterally- no wheezes, rales or rhonchi, normal air movement, no respiratory distress  Cardiovascular: tachycardic rate, normal S1 and S2   Abdomen: soft, non-tender, non-distended, normal bowel sounds  Extremities: no cyanosis, no clubbing and 1+ bilateral lower extremity edema  Neurologic: no cranial nerve deficit and speech normal    LABS:  Recent Labs     11/17/21  0802 11/17/21  1117    138   K 4.2 4.5    101   CO2 25 24   BUN 17 18   CREATININE 1.7* 1.7*   GLUCOSE 107* 87   CALCIUM 9.7 9.6       Recent Labs 11/17/21  0556   WBC 13.5*   RBC 4.36   HGB 13.8   HCT 46.7   .1*   MCH 31.7   MCHC 29.6*   RDW 14.5   PLT 71*   MPV 13.8*       No results for input(s): POCGLU in the last 72 hours. Radiology: XR CHEST 1 VIEW    Result Date: 11/17/2021  EXAMINATION: ONE XRAY VIEW OF THE CHEST 11/17/2021 5:17 am COMPARISON: 2nd June 2020 HISTORY: ORDERING SYSTEM PROVIDED HISTORY: sepsis TECHNOLOGIST PROVIDED HISTORY: Reason for exam:->sepsis FINDINGS: Heart is mildly enlarged. Pulmonary vascularity is normal.  Lungs are clear. Neither costophrenic angle is blunted. Mild cardiomegaly. EKG: SR LBB    ASSESSMENT:      Principal Problem:    Sepsis secondary to UTI [facility])  Active Problems:    Depression    Hypertension  Resolved Problems:    * No resolved hospital problems. *      PLAN:    1. Sepsis secondary to UTI:  WBC 13.5, Temp 101.7, heart rate 107, Urinalysis was positive for a UTI. She received ceftriaxone in the ED and was started on Cefepime on admission. ID consulted due to history of Klebsiella UTI in 2019 and UTI in renal transplant patient. Blood and urine cultrues pending. 2. Acute respiratory failure with hypoxia:  Pulse ox was 88% on room air. O2 3 liters applied. Chest xray showed pulmonary vascular congestion. Respiratory film array ordered. 3. Acute on chronic systolic and diastolic heart failure:  Pro-BNP greater than 2000, Last Echo on 12/21/19 revealed an EF of 89-02%, stage I diastolic dysfunction. Chest xray with pulmonary vascular congestion. Repeat Echo ordered. Lasix 20 mg IV x 1.   4. Acute on chronic kidney disease:  Creatinine was 1.7 on admission. Renal ultrasound ordered by ID. 5. Kidney transplant:  Kidney transplant in 2015. Continue anti-rejection medications. Patient   6. Bilateral lower extremity edema:  D dimer  Was 836. Check ultrasound of the lower extremities. Unable to obtain CTA of the chest due to elevated creatinine. Consider VQ scan. 7. Depression: Continue home medications. 8. Elevated troponin level:  Troponin level was 52. Repeat level ordered. Code Status: Full  DVT prophylaxis: Heparin 5000 units sub q three times a day. NOTE: This report was transcribed using voice recognition software.  Every effort was made to ensure accuracy; however, inadvertent computerized transcription errors may be present.     Electronically signed by KATINA Reeves CNP on 11/17/2021 at 6:02 PM

## 2021-11-17 NOTE — CONSULTS
[facility]  Consult Note    [address] [place] [address] 80  [place], [address]  Phone [phone]   [phone]      Admit Date: 2021  4:50 AM  Pt Name: Elham Cohen  MRN: [id number]  : 1955  Reason for Consult:    Chief Complaint   Patient presents with    Urinary Tract Infection     Requesting Physician:  Carson Marte MD  PCP: Stanly Brunner, MD  History Obtained From:  patient, chart   ID consulted for Sepsis due to urinary tract infection ([address] [A41.9, N39.0]  Urinary tract infection without hematuria, site unspecified [N39.0]  Sepsis, due to unspecified organism, unspecified whether acute organ dysfunction present ([address] [A41.9]  on hospital day 0   Face to face encounter   CHIEF COMPLAINT       Chief Complaint   Patient presents with    Urinary Tract Infection     HISTORYOF PRESENT ILLNESS   Elham Cohen is a 73 y.o. female who presents with   has a past medical history of Abnormal EKG, Acute gout involving toe of right foot, Acute gout involving toe of right foot, Cancer ([address] Cerebrovascular disease, Clotting disorder ([address] Depression, Hyperlipidemia, Hypertension, Hyperthyroidism, Leg swelling, Lymphedema of both lower extremities, Peripheral vascular disease ([address] Renal failure, Thrombophlebitis, and Thyroid disease. ED TRIAGEVITALS  BP: (!) 121/58, Temp: 97.7 °F (36.5 °C), Pulse: 102, Resp: 20, SpO2: 96 %  HPI   Patient admitted to hospital from home. She was weak, reports dysuria, and generalized weakness. She has been having fever and chills at home. She was hypoxic at home- 88 %. She has a cough. She was admitted for further evaluation. She is known to ID service- she was last seen in 2019-for a klebsiella UTI- was discharge with cefepime due to tachycardia and prolonged QT with cipro.      She is currently on IV cefepime  Her temp has been as high as 101.7   She has a cough - and is tachypnic     ID has been asked to evaluate and manage atbs. REVIEW OF SYSTEMS     CONSTITUTIONAL:   ++ fever, + chills, weight loss  ALLERGIES:    No urticaria, hay fever,    EYES:     No blurry vision, loss of vision, eye pain  ENT:      No hearing loss, sore throat  CARDIOVASCULAR:   No chest pain or palpitations, + HTN, + Hyperlipidemia. RESPIRATORY:   ++ cough, ++sob  ENDOCRINE:    No increase thirst, urination , + hyperthyroidism,   HEME-LYMPH:   No easy bruising or bleeding  GI:     No nausea, vomiting or diarrhea  :     ++ dysuria   NEURO:    No seizures, stroke, HA  MUSCULOSKELETAL:  No muscle aches or pain, no joint pain  SKIN:     No rash or itch  PSYCH:    + depression or anxiety    Medications Prior to Admission: pregabalin (LYRICA) 75 MG capsule, Take 75 mg by mouth 2 times daily. allopurinol (ZYLOPRIM) 100 MG tablet, Take 100 mg by mouth daily  Cyanocobalamin (B-12 COMPLIANCE INJECTION) 1000 MCG/ML KIT, Inject as directed monthly  metoprolol succinate (TOPROL XL) 50 MG extended release tablet, take 1 tablet by mouth once daily  desvenlafaxine succinate (PRISTIQ) 50 MG TB24 extended release tablet, Take 1 tablet by mouth every evening  cloZAPine (CLOZARIL) 50 MG tablet, Take 50 mg by mouth nightly  cycloSPORINE (SANDIMMUNE) 100 MG capsule, Take 100 mg by mouth 2 times daily  predniSONE (DELTASONE) 5 MG tablet, Take 5 mg by mouth daily  lisinopril (PRINIVIL;ZESTRIL) 5 MG tablet, Take 5 mg by mouth daily  folic acid (FOLVITE) 1 MG tablet, Take 1 mg by mouth daily  docusate sodium (COLACE) 100 MG capsule, Take 100 mg by mouth 2 times daily  mycophenolate (CELLCEPT) 500 MG tablet, Take 1,000 mg by mouth 2 times daily  simvastatin (ZOCOR) 20 MG tablet, Take 20 mg by mouth nightly. levothyroxine (SYNTHROID) 50 MCG tablet, Take 50 mcg by mouth daily.     furosemide (LASIX) 20 MG tablet, Take 20 mg by mouth daily as needed (Swelling)  CURRENT MEDICATIONS     Current Facility-Administered Medications:     cefepime (MAXIPIME) 1,000 mg in dextrose 5 % 50 mL extended infusion IVPB, 1,000 mg, IntraVENous, Q12H **AND** 0.9 % sodium chloride infusion, 25 mL, IntraVENous, Q12H, KATINA Fajardo CNP    allopurinol (ZYLOPRIM) tablet 100 mg, 100 mg, Oral, Daily, KATINA Fajardo CNP    cloZAPine (CLOZARIL) tablet 50 mg, 50 mg, Oral, Nightly, KATINA Fajardo CNP    cycloSPORINE modified (NEORAL) capsule 100 mg, 100 mg, Oral, BID, KATINA Fajardo CNP    desvenlafaxine succinate (PRISTIQ) extended release tablet 50 mg, 50 mg, Oral, QPM, KATINA Fajardo CNP    docusate sodium (COLACE) capsule 100 mg, 100 mg, Oral, BID, KATINA Fajadro CNP    folic acid (FOLVITE) tablet 1 mg, 1 mg, Oral, Daily, KATINA Fajardo CNP    levothyroxine (SYNTHROID) tablet 50 mcg, 50 mcg, Oral, Daily, KATINA Fajardo CNP    lisinopril (PRINIVIL;ZESTRIL) tablet 5 mg, 5 mg, Oral, Daily, KATINA Fajardo CNP    metoprolol succinate (TOPROL XL) extended release tablet 50 mg, 50 mg, Oral, Daily, KATINA Fajardo CNP    mycophenolate (CELLCEPT) tablet 1,000 mg, 1,000 mg, Oral, BID, KATINA Fajardo CNP    predniSONE (DELTASONE) tablet 5 mg, 5 mg, Oral, Daily, KATINA Fajardo CNP    pregabalin (LYRICA) capsule 75 mg, 75 mg, Oral, BID, KATINA Fajardo CNP    atorvastatin (LIPITOR) tablet 10 mg, 10 mg, Oral, Daily, KATINA Fajardo CNP    sodium chloride flush 0.9 % injection 5-40 mL, 5-40 mL, IntraVENous, 2 times per day, KATINA Fajardo CNP    sodium chloride flush 0.9 % injection 5-40 mL, 5-40 mL, IntraVENous, PRN, Zhanna Crum, APRN - CNP    0.9 % sodium chloride infusion, 25 mL, IntraVENous, PRN, Zhanna Crum APRN - CNP    ondansetron (ZOFRAN-ODT) disintegrating tablet 4 mg, 4 mg, Oral, Q8H PRN **OR** ondansetron (ZOFRAN) injection 4 mg, 4 mg, IntraVENous, Q6H PRN, Zhanna Crum APRN - CNP    acetaminophen (TYLENOL) tablet 650 mg, 650 mg, Oral, Q6H PRN **OR** acetaminophen (TYLENOL) suppository 650 mg, 650 mg, Rectal, Q6H PRN, Odalys Rhymes, APRN - CNP    polyethylene glycol (GLYCOLAX) packet 17 g, 17 g, Oral, Daily PRN, Odalys Rhymes, APRN - CNP    heparin (porcine) injection 5,000 Units, 5,000 Units, SubCUTAneous, 3 times per day, Odalys Rhymes, APRN - CNP    perflutren lipid microspheres (DEFINITY) injection 1.65 mg, 1.5 mL, IntraVENous, ONCE PRN, Odalys Rhymes, APRN - CNP    furosemide (LASIX) injection 20 mg, 20 mg, IntraVENous, Once, Odalys Rhymes, APRN - CNP  ALLERGIES     Macrodantin [nitrofurantoin macrocrystal] and Sulfa antibiotics  Immunization History   Administered Date(s) Administered    COVID-19, Pfizer, PF, 30mcg/0.3mL 01/28/2021, 02/18/2021, 08/18/2021      Internal Administration   First Dose COVID-19, Pfizer, PF, 30mcg/0.3mL  01/28/2021   Second Dose COVID-19, Pfizer, PF, 30mcg/0.3mL   02/18/2021       Last COVID Lab SARS-CoV-2 (no units)   Date Value   01/20/2021 Not Detected     SARS-CoV-2, NAAT (no units)   Date Value   11/17/2021 Not Detected        PAST MEDICAL HISTORY     Past Medical History:   Diagnosis Date    Abnormal EKG     left bundle branch block    Acute gout involving toe of right foot 9/3/2020    Acute gout involving toe of right foot 9/3/2020    Cancer (HCC)     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    Cerebrovascular disease     Clotting disorder ([facility] 1985    thrombophlebitis after c section delivery    Depression     15 years followed by dr Robby Martel    Hyperlipidemia     Hypertension     Hyperthyroidism     Leg swelling 9/3/2020    Lymphedema of both lower extremities 9/3/2020    Peripheral vascular disease ([facility]     Renal failure 11/2012    renal transplant    Thrombophlebitis     after c section delivery    Thyroid disease      SURGICAL HISTORY       Past Surgical History:   Procedure Laterality Date   [address]    one normal delivery    COLONOSCOPY      DIAGNOSTIC CARDIAC CATH LAB PROCEDURE      normal coronaries and  normal coronaries    DIALYSIS FISTULA CREATION  march and 2012    phase one and two patient will start dialysis when needed   [address]    transfemoral venous bypass grafts    KIDNEY TRANSPLANT  2016    KIDNEY TRANSPLANT  2015    KIDNEY TRANSPLANT  2015    PARATHYROIDECTOMY  2006    left parathyroid  implant and parathyroidectomy     FAMILY HISTORY       Family History   Problem Relation Age of Onset    Diabetes Mother     Diabetes Father     Dementia Father      SOCIAL HISTORY       Social History     Socioeconomic History    Marital status:      Spouse name: Not on file    Number of children: Not on file    Years of education: Not on file    Highest education level: Not on file   Occupational History    Not on file   Tobacco Use    Smoking status: Former Smoker     Packs/day: 1.00     Years: 20.00     Pack years: 20.00     Quit date: 10/1/2015     Years since quittin.1    Smokeless tobacco: Never Used   Vaping Use    Vaping Use: Never used   Substance and Sexual Activity    Alcohol use: No     Comment: 2 cups coffee per day    Drug use: No    Sexual activity: Not on file   Other Topics Concern    Not on file   Social History Narrative    Not on file     Social Determinants of Health     Financial Resource Strain:     Difficulty of Paying Living Expenses: Not on file   Food Insecurity:     Worried About Running Out of Food in the Last Year: Not on file    Vannessa of Food in the Last Year: Not on file   Transportation Needs:     Lack of Transportation (Medical): Not on file    Lack of Transportation (Non-Medical):  Not on file   Physical Activity:     Days of Exercise per Week: Not on file    Minutes of Exercise per Session: Not on file   Stress:     Feeling of Stress : Not on file   Social Connections:     Frequency of Communication with Friends and Family: Not on file    Frequency of Social Gatherings with Friends and Family: Not on file    Attends Yazdanism Services: Not on file    Active Member of Clubs or Organizations: Not on file    Attends Club or Organization Meetings: Not on file    Marital Status: Not on file   Intimate Partner Violence:     Fear of Current or Ex-Partner: Not on file    Emotionally Abused: Not on file    Physically Abused: Not on file    Sexually Abused: Not on file   Housing Stability:     Unable to Pay for Housing in the Last Year: Not on file    Number of [place] in the Last Year: Not on file    Unstable Housing in the Last Year: Not on file     · Lives alone     [address]       ED Triage Vitals   BP Temp Temp Source Pulse Resp SpO2 Height Weight   11/17/21 0452 11/17/21 0452 11/17/21 1030 11/17/21 0452 11/17/21 0452 11/17/21 0452 11/17/21 1100 11/17/21 0452   (!) 151/85 100.9 °F (38.3 °C) Oral 107 18 99 % 5' 2\" (1.575 m) 200 lb (90.7 kg)     Vitals:    Vitals:    11/17/21 0452 11/17/21 0813 11/17/21 1030 11/17/21 1100   BP: (!) 151/85 107/73 (!) 121/58    Pulse: 107 99 102    Resp: 18 18 20    Temp: 100.9 °F (38.3 °C) 101.7 °F (38.7 °C) 97.7 °F (36.5 °C)    TempSrc:   Oral    SpO2: 99% 97% 96%    Weight: 200 lb (90.7 kg)   227 lb 3.2 oz (103.1 kg)   Height:    5' 2\" (1.575 m)     Physical Exam   Constitutional/General: Alert and oriented, + cough, + accessory muscle use. Head: NC/AT  Eyes: PERRL, EOMI  Mouth: Normal mucosa, no thrush   Neck: Supple, full ROM,    Pulmonary: Lungs with wheezing noted- + cough + accessory muscle use . ++ cough   Cardiovascular: Tachy   Abdomen: Soft, + BS. No distension. Nontender. Extremities: Moves all extremities x 4.    Warm and well perfused,++ edema   Skin: Warm and dry without rash  Neurologic:    No focal deficits  Psych: Normal Affect  PIV     DIAGNOSTIC RESULTS   RADIOLOGY:   XR CHEST (2 VW)    Result Date: 11/17/2021  EXAMINATION: TWO XRAY VIEWS OF THE CHEST 11/17/2021 9:31 am COMPARISON: None. HISTORY: ORDERING SYSTEM PROVIDED HISTORY: elevated BNP, wheezing TECHNOLOGIST PROVIDED HISTORY: Reason for exam:->elevated BNP, wheezing FINDINGS: There is prominence of the pulmonary vasculature. There is no effusion or pneumothorax. The cardiomediastinal silhouette is stable in size. The osseous structures are without acute process. Mild pulmonary vascular congestion. XR CHEST 1 VIEW    Result Date: 11/17/2021  EXAMINATION: ONE XRAY VIEW OF THE CHEST 11/17/2021 5:17 am COMPARISON: 2nd June 2020 HISTORY: ORDERING SYSTEM PROVIDED HISTORY: sepsis TECHNOLOGIST PROVIDED HISTORY: Reason for exam:->sepsis FINDINGS: Heart is mildly enlarged. Pulmonary vascularity is normal.  Lungs are clear. Neither costophrenic angle is blunted. Mild cardiomegaly. LABS  Recent Labs     11/17/21  0556   WBC 13.5*   HGB 13.8   HCT 46.7   .1*   PLT 71*     Recent Labs     11/17/21  0802 11/17/21  0802 11/17/21  1117     --  138   K 4.2   < > 4.5      < > 101   CO2 25   < > 24   BUN 17  --  18   CREATININE 1.7*  --  1.7*   GFRAA 36  --  36   LABGLOM 30  --  30   GLUCOSE 107*  --  87   PROT 6.2*  --  6.1*   LABALBU 3.7  --  3.4*   CALCIUM 9.7  --  9.6   BILITOT 0.7  --  0.6   ALKPHOS 73  --  62   AST 15  --  20   ALT 6  --  7    < > = values in this interval not displayed.      Lab Results   Component Value Date    COVID19 Not Detected 11/17/2021    COVID19 Not Detected 01/20/2021     COVID-19/MARINA-COV2 LABS  Recent Labs     11/17/21  0556 11/17/21  0802 11/17/21  1117   INR 1.1  --   --    PROTIME 12.9*  --   --    AST  --  15 20   ALT  --  6 7     MICROBIOLOGY:     Cultures :   Lab Results   Component Value Date    [facility]  12/17/2019     Refer to previous culture of CXBLD from 12-17-19 @1120 for  susceptibility results      ORG Escherichia coli 01/20/2021    ORG Klebsiella pneumoniae ssp pneumoniae 12/17/2019    ORG Klebsiella pneumoniae ssp pneumoniae 12/17/2019    ORG Klebsiella pneumoniae ssp pneumoniae 12/17/2019     Lab Results   Component Value Date    ORG Escherichia coli 01/20/2021    ORG Klebsiella pneumoniae ssp pneumoniae 12/17/2019    ORG Klebsiella pneumoniae ssp pneumoniae 12/17/2019    ORG Klebsiella pneumoniae ssp pneumoniae 12/17/2019     Urine Culture, Routine   Date Value Ref Range Status   04/09/2021   Final    10 to 100,000 CFU/mL  Mixed rubio isolated. Further workup and sensitivity testing  is not routinely indicated and will not be performed. Mixed rubio isolated includes:  Escherichia coli  Mixed gram positive organisms  Alpha hemolytic Strep species  Yeast  Nonhemolytic Strep species     01/20/2021 >100,000 CFU/ml  Final   02/05/2020   Final    ,000 CFU/mL  Mixed rubio isolated. Further workup and sensitivity testing  is not routinely indicated and will not be performed. Mixed rubio isolated includes:  Mixed gram positive organisms       FINAL IMPRESSION    Patient is a 73 y.o. female who presented with   Chief Complaint   Patient presents with    Urinary Tract Infection    and admitted for Sepsis due to urinary tract infection ([facility] [A41.9, N39.0]  Urinary tract infection without hematuria, site unspecified [N39.0]  Sepsis, due to unspecified organism, unspecified whether acute organ dysfunction present ([facility] [A41.9]  · Fevers   · Leukocytosis   · UTI/ dysuria   · History of kidney transplant   · Rule out viral infection    · Immunocompromised patient     Plan:   · Continue Cefepime for now- adjusted for renal  · Check MRSA screen  · Monitor labs  · Check cultures  · Check bladder scab  · Respiratory panel- pending  · Check renal ultrasound - may need CT of A/P   · Monitor labs - check BNP - 2666  · Check ldh, procal, crp, ddimer      Imaging and labs were reviewed per medical records and any ID pertinent labs were addressed with the patient. The patient/FAMILY  was educated about the diagnosis, prognosis, indications, risks and benefits of treatment. An opportunity to ask questions was given to the patient/FAMILY and questions were answered. Thank you for involving me in the care of Micah Washington. Please do not hesitate to call for any questions or concerns. Electronically signed by KATINA Edwards on 11/17/2021 at 12:41 PM        As above    This is a face to face encounter with Micah Washington on 11/17/21. I have performed and participated in the history, exam, medical decision making, and  POC with the NURSE PRACTITIONER, KATINA Edwards. Immunocompromised pt with kidney transplant   pt in bed on RA has wheeze/congestion  Some confusion   Fevers [id number] on 3L cr1.7 FGI48.0    rx complicated uti us neg for hydro vikram   R/o viral syndrome  cxry Mild pulmonary vascular congestion. covid screen neg  Follow labs  cefepime (MAXIPIME) 1,000 mg in dextrose 5 % 50 mL extended infusion IVPB, Q12H  cycloSPORINE modified (NEORAL) capsule 100 mg, BID  predniSONE (DELTASONE) tablet 5 mg, Daily  mycophenolate (CELLCEPT) capsule 1,000 mg, BID           Imaging and labs were reviewed. Micah [place] was informed of their diagnosis, indications, risks and benefits of treatment. Micah Washington had the opportunity to ask questions. All questions were answered. Thank you for involving me in the care of Micah Washington. Please do not hesitate to call for any questions or concerns.     Electronically signed by Jonathan Black MD on 11/17/2021 at 7:17 PM    Phone [phone]  Fax [phone]

## 2021-11-18 LAB
ADENOVIRUS BY PCR: NOT DETECTED
ALBUMIN SERPL-MCNC: 3.2 G/DL (ref 3.5–5.2)
ALP BLD-CCNC: 59 U/L (ref 35–104)
ALT SERPL-CCNC: 8 U/L (ref 0–32)
ANION GAP SERPL CALCULATED.3IONS-SCNC: 14 MMOL/L (ref 7–16)
AST SERPL-CCNC: 29 U/L (ref 0–31)
BASOPHILS ABSOLUTE: 0 E9/L (ref 0–0.2)
BASOPHILS RELATIVE PERCENT: 0.2 % (ref 0–2)
BILIRUB SERPL-MCNC: 0.8 MG/DL (ref 0–1.2)
BORDETELLA PARAPERTUSSIS BY PCR: NOT DETECTED
BORDETELLA PERTUSSIS BY PCR: NOT DETECTED
BOTTLE TYPE: ABNORMAL
BUN BLDV-MCNC: 30 MG/DL (ref 6–23)
CALCIUM SERPL-MCNC: 9.6 MG/DL (ref 8.6–10.2)
CANDIDA ALBICANS BY PCR: NOT DETECTED
CANDIDA GLABRATA BY PCR: NOT DETECTED
CANDIDA KRUSEI BY PCR: NOT DETECTED
CANDIDA PARAPSILOSIS BY PCR: NOT DETECTED
CANDIDA TROPICALIS BY PCR: NOT DETECTED
CARBAPENEM RESISTANCE KPC BY PCR: NOT DETECTED
CHLAMYDOPHILIA PNEUMONIAE BY PCR: NOT DETECTED
CHLORIDE BLD-SCNC: 101 MMOL/L (ref 98–107)
CO2: 23 MMOL/L (ref 22–29)
CORONAVIRUS 229E BY PCR: NOT DETECTED
CORONAVIRUS HKU1 BY PCR: NOT DETECTED
CORONAVIRUS NL63 BY PCR: NOT DETECTED
CORONAVIRUS OC43 BY PCR: NOT DETECTED
CREAT SERPL-MCNC: 2.1 MG/DL (ref 0.5–1)
EKG ATRIAL RATE: 79 BPM
EKG P AXIS: 52 DEGREES
EKG P-R INTERVAL: 170 MS
EKG Q-T INTERVAL: 426 MS
EKG QRS DURATION: 146 MS
EKG QTC CALCULATION (BAZETT): 488 MS
EKG R AXIS: -50 DEGREES
EKG T AXIS: 141 DEGREES
EKG VENTRICULAR RATE: 79 BPM
ENTEROBACTER CLOACAE COMPLEX BY PCR: NOT DETECTED
ENTEROBACTERALES BY PCR: DETECTED
ENTEROCOCCUS BY PCR: NOT DETECTED
EOSINOPHILS ABSOLUTE: 0 E9/L (ref 0.05–0.5)
EOSINOPHILS RELATIVE PERCENT: 0.1 % (ref 0–6)
ESCHERICHIA COLI BY PCR: NOT DETECTED
FERRITIN: 555 NG/ML
GFR AFRICAN AMERICAN: 29
GFR NON-AFRICAN AMERICAN: 24 ML/MIN/1.73
GLUCOSE BLD-MCNC: 89 MG/DL (ref 74–99)
HAEMOPHILUS INFLUENZAE BY PCR: NOT DETECTED
HCT VFR BLD CALC: 45.7 % (ref 34–48)
HEMOGLOBIN: 13.8 G/DL (ref 11.5–15.5)
HUMAN METAPNEUMOVIRUS BY PCR: NOT DETECTED
HUMAN RHINOVIRUS/ENTEROVIRUS BY PCR: NOT DETECTED
INFLUENZA A BY PCR: NOT DETECTED
INFLUENZA B BY PCR: NOT DETECTED
IRON SATURATION: 8 % (ref 15–50)
IRON: 18 MCG/DL (ref 37–145)
KLEBSIELLA OXYTOCA BY PCR: NOT DETECTED
KLEBSIELLA PNEUMONIAE GROUP BY PCR: DETECTED
LISTERIA MONOCYTOGENES BY PCR: NOT DETECTED
LYMPHOCYTES ABSOLUTE: 0.26 E9/L (ref 1.5–4)
LYMPHOCYTES RELATIVE PERCENT: 1.8 % (ref 20–42)
MAGNESIUM: 1.6 MG/DL (ref 1.6–2.6)
MCH RBC QN AUTO: 31.9 PG (ref 26–35)
MCHC RBC AUTO-ENTMCNC: 30.2 % (ref 32–34.5)
MCV RBC AUTO: 105.5 FL (ref 80–99.9)
METHICILLIN RESISTANCE MECA/C  BY PCR: DETECTED
MONOCYTES ABSOLUTE: 0.52 E9/L (ref 0.1–0.95)
MONOCYTES RELATIVE PERCENT: 3.5 % (ref 2–12)
MYCOPLASMA PNEUMONIAE BY PCR: NOT DETECTED
NEISSERIA MENINGITIDIS BY PCR: NOT DETECTED
NEUTROPHILS ABSOLUTE: 12.26 E9/L (ref 1.8–7.3)
NEUTROPHILS RELATIVE PERCENT: 94.7 % (ref 43–80)
ORDER NUMBER: ABNORMAL
PARAINFLUENZA VIRUS 1 BY PCR: NOT DETECTED
PARAINFLUENZA VIRUS 2 BY PCR: NOT DETECTED
PARAINFLUENZA VIRUS 3 BY PCR: NOT DETECTED
PARAINFLUENZA VIRUS 4 BY PCR: NOT DETECTED
PDW BLD-RTO: 14.6 FL (ref 11.5–15)
PHOSPHORUS: 4.9 MG/DL (ref 2.5–4.5)
PLATELET # BLD: 78 E9/L (ref 130–450)
PLATELET CONFIRMATION: NORMAL
PMV BLD AUTO: 13.8 FL (ref 7–12)
POLYCHROMASIA: ABNORMAL
POTASSIUM SERPL-SCNC: 4.8 MMOL/L (ref 3.5–5)
PROTEUS SPECIES BY PCR: NOT DETECTED
PSEUDOMONAS AERUGINOSA BY PCR: NOT DETECTED
RBC # BLD: 4.33 E12/L (ref 3.5–5.5)
RESPIRATORY SYNCYTIAL VIRUS BY PCR: NOT DETECTED
SARS-COV-2, PCR: NOT DETECTED
SERRATIA MARCESCENS BY PCR: NOT DETECTED
SODIUM BLD-SCNC: 138 MMOL/L (ref 132–146)
SOURCE OF BLOOD CULTURE: ABNORMAL
STAPHYLOCOCCUS AUREUS BY PCR: NOT DETECTED
STAPHYLOCOCCUS SPECIES BY PCR: DETECTED
STREPTOCOCCUS AGALACTIAE BY PCR: NOT DETECTED
STREPTOCOCCUS PNEUMONIAE BY PCR: NOT DETECTED
STREPTOCOCCUS PYOGENES  BY PCR: NOT DETECTED
STREPTOCOCCUS SPECIES BY PCR: NOT DETECTED
TOTAL IRON BINDING CAPACITY: 227 MCG/DL (ref 250–450)
TOTAL PROTEIN: 6.1 G/DL (ref 6.4–8.3)
TROPONIN, HIGH SENSITIVITY: 56 NG/L (ref 0–9)
VITAMIN D 25-HYDROXY: 46 NG/ML (ref 30–100)
WBC # BLD: 12.9 E9/L (ref 4.5–11.5)

## 2021-11-18 PROCEDURE — 94640 AIRWAY INHALATION TREATMENT: CPT

## 2021-11-18 PROCEDURE — 6370000000 HC RX 637 (ALT 250 FOR IP): Performed by: NURSE PRACTITIONER

## 2021-11-18 PROCEDURE — 83540 ASSAY OF IRON: CPT

## 2021-11-18 PROCEDURE — 85025 COMPLETE CBC W/AUTO DIFF WBC: CPT

## 2021-11-18 PROCEDURE — 6360000002 HC RX W HCPCS: Performed by: NURSE PRACTITIONER

## 2021-11-18 PROCEDURE — 83735 ASSAY OF MAGNESIUM: CPT

## 2021-11-18 PROCEDURE — 2580000003 HC RX 258: Performed by: NURSE PRACTITIONER

## 2021-11-18 PROCEDURE — 99233 SBSQ HOSP IP/OBS HIGH 50: CPT | Performed by: INTERNAL MEDICINE

## 2021-11-18 PROCEDURE — 93005 ELECTROCARDIOGRAM TRACING: CPT | Performed by: NURSE PRACTITIONER

## 2021-11-18 PROCEDURE — APPSS30 APP SPLIT SHARED TIME 16-30 MINUTES: Performed by: NURSE PRACTITIONER

## 2021-11-18 PROCEDURE — APPSS45 APP SPLIT SHARED TIME 31-45 MINUTES: Performed by: NURSE PRACTITIONER

## 2021-11-18 PROCEDURE — 82306 VITAMIN D 25 HYDROXY: CPT

## 2021-11-18 PROCEDURE — 83550 IRON BINDING TEST: CPT

## 2021-11-18 PROCEDURE — 2700000000 HC OXYGEN THERAPY PER DAY

## 2021-11-18 PROCEDURE — 99222 1ST HOSP IP/OBS MODERATE 55: CPT | Performed by: INTERNAL MEDICINE

## 2021-11-18 PROCEDURE — 84484 ASSAY OF TROPONIN QUANT: CPT

## 2021-11-18 PROCEDURE — 36415 COLL VENOUS BLD VENIPUNCTURE: CPT

## 2021-11-18 PROCEDURE — 82728 ASSAY OF FERRITIN: CPT

## 2021-11-18 PROCEDURE — 84100 ASSAY OF PHOSPHORUS: CPT

## 2021-11-18 PROCEDURE — 1200000000 HC SEMI PRIVATE

## 2021-11-18 PROCEDURE — 97161 PT EVAL LOW COMPLEX 20 MIN: CPT

## 2021-11-18 PROCEDURE — 80053 COMPREHEN METABOLIC PANEL: CPT

## 2021-11-18 RX ORDER — MAGNESIUM SULFATE IN WATER 40 MG/ML
2000 INJECTION, SOLUTION INTRAVENOUS ONCE
Status: COMPLETED | OUTPATIENT
Start: 2021-11-18 | End: 2021-11-18

## 2021-11-18 RX ADMIN — DOCUSATE SODIUM 100 MG: 100 CAPSULE ORAL at 12:52

## 2021-11-18 RX ADMIN — PREDNISONE 5 MG: 5 TABLET ORAL at 12:52

## 2021-11-18 RX ADMIN — PREGABALIN 75 MG: 75 CAPSULE ORAL at 12:52

## 2021-11-18 RX ADMIN — ATORVASTATIN CALCIUM 10 MG: 10 TABLET, FILM COATED ORAL at 12:52

## 2021-11-18 RX ADMIN — SODIUM CHLORIDE 25 ML: 9 INJECTION, SOLUTION INTRAVENOUS at 19:19

## 2021-11-18 RX ADMIN — IPRATROPIUM BROMIDE AND ALBUTEROL SULFATE 1 AMPULE: .5; 2.5 SOLUTION RESPIRATORY (INHALATION) at 05:08

## 2021-11-18 RX ADMIN — HEPARIN SODIUM 5000 UNITS: 5000 INJECTION INTRAVENOUS; SUBCUTANEOUS at 05:05

## 2021-11-18 RX ADMIN — CEFEPIME HYDROCHLORIDE 1000 MG: 1 INJECTION, POWDER, FOR SOLUTION INTRAMUSCULAR; INTRAVENOUS at 18:21

## 2021-11-18 RX ADMIN — CEFEPIME HYDROCHLORIDE 1000 MG: 1 INJECTION, POWDER, FOR SOLUTION INTRAMUSCULAR; INTRAVENOUS at 01:49

## 2021-11-18 RX ADMIN — MAGNESIUM SULFATE HEPTAHYDRATE 2000 MG: 40 INJECTION, SOLUTION INTRAVENOUS at 12:51

## 2021-11-18 RX ADMIN — CLOZAPINE 50 MG: 25 TABLET ORAL at 23:03

## 2021-11-18 RX ADMIN — FOLIC ACID 1 MG: 1 TABLET ORAL at 12:52

## 2021-11-18 RX ADMIN — METOPROLOL SUCCINATE 50 MG: 50 TABLET, EXTENDED RELEASE ORAL at 12:52

## 2021-11-18 RX ADMIN — PREGABALIN 75 MG: 75 CAPSULE ORAL at 23:00

## 2021-11-18 RX ADMIN — MYCOPHENOLATE MOFETIL 1000 MG: 250 CAPSULE ORAL at 23:02

## 2021-11-18 RX ADMIN — CYCLOSPORINE 100 MG: 25 CAPSULE, LIQUID FILLED ORAL at 23:01

## 2021-11-18 RX ADMIN — ALLOPURINOL 100 MG: 100 TABLET ORAL at 12:52

## 2021-11-18 RX ADMIN — IPRATROPIUM BROMIDE AND ALBUTEROL SULFATE 1 AMPULE: .5; 2.5 SOLUTION RESPIRATORY (INHALATION) at 14:49

## 2021-11-18 RX ADMIN — SODIUM CHLORIDE 25 ML: 9 INJECTION, SOLUTION INTRAVENOUS at 05:04

## 2021-11-18 RX ADMIN — DOCUSATE SODIUM 100 MG: 100 CAPSULE ORAL at 23:01

## 2021-11-18 RX ADMIN — MYCOPHENOLATE MOFETIL 1000 MG: 250 CAPSULE ORAL at 23:52

## 2021-11-18 RX ADMIN — LEVOTHYROXINE SODIUM 50 MCG: 0.05 TABLET ORAL at 05:05

## 2021-11-18 RX ADMIN — HEPARIN SODIUM 5000 UNITS: 5000 INJECTION INTRAVENOUS; SUBCUTANEOUS at 18:25

## 2021-11-18 RX ADMIN — DESVENLAFAXINE 50 MG: 50 TABLET, EXTENDED RELEASE ORAL at 19:24

## 2021-11-18 ASSESSMENT — PAIN SCALES - GENERAL: PAINLEVEL_OUTOF10: 0

## 2021-11-18 NOTE — PROGRESS NOTES
[facility]     [address] [place] [address]  [place], [address]  Phone [phone]   [phone]      Admit Date: 2021  4:50 AM  Pt Name: Kj Leon  MRN: [id number]  : 1955  Reason for Consult:    Chief Complaint   Patient presents with    Urinary Tract Infection     Requesting Physician:  Sahil Daily MD  PCP: Cristóbal Ch MD  History Obtained From:  patient, chart   ID consulted for Sepsis due to urinary tract infection ([address] [A41.9, N39.0]  Urinary tract infection without hematuria, site unspecified [N39.0]  Sepsis, due to unspecified organism, unspecified whether acute organ dysfunction present ([address] [A41.9]  on hospital day 1   Face to face encounter   CHIEF COMPLAINT       Chief Complaint   Patient presents with    Urinary Tract Infection     HISTORYOF PRESENT ILLNESS   Kj Leon is a 73 y.o. female who presents with   has a past medical history of Abnormal EKG, Acute gout involving toe of right foot, Acute gout involving toe of right foot, Cancer ([address] Cerebrovascular disease, Clotting disorder ([address] Depression, Hyperlipidemia, Hypertension, Hyperthyroidism, Leg swelling, Lymphedema of both lower extremities, Peripheral vascular disease ([address] Renal failure, Thrombophlebitis, and Thyroid disease. HPI   Patient admitted to hospital from home. She was weak, reports dysuria, and generalized weakness. She has been having fever and chills at home. She was hypoxic at home- 88 %. She has a cough. She was admitted for further evaluation. She is known to ID service- she was last seen in 2019-for a klebsiella UTI- was discharge with cefepime due to tachycardia and prolonged QT with cipro. She is currently on IV cefepime  Her temp has been as high as 101.7   She has a cough - and is tachypnic     ID has been asked to evaluate and manage atbs  Current visit  Joleen Crisostomo  21   . pt has visitor more awake less congested   On bedpan unable to go to bathroom  REVIEW OF SYSTEMS     CONSTITUTIONAL:   ++ fever, + chills, weight loss  ALLERGIES:    No urticaria, hay fever,    EYES:     No blurry vision, loss of vision, eye pain  ENT:      No hearing loss, sore throat  CARDIOVASCULAR:   No chest pain or palpitations, + HTN, + Hyperlipidemia.    RESPIRATORY:   ++ cough, ++sob  ENDOCRINE:    No increase thirst, urination , + hyperthyroidism,   HEME-LYMPH:   No easy bruising or bleeding  GI:     No nausea, vomiting or diarrhea  :     ++ dysuria   NEURO:    No seizures, stroke, HA  MUSCULOSKELETAL:  No muscle aches or pain, no joint pain  SKIN:     No rash or itch  PSYCH:    + depression or anxiety       CURRENT MEDICATIONS     Current Facility-Administered Medications:     magnesium sulfate 2000 mg in 50 mL IVPB premix, 2,000 mg, IntraVENous, Once, Renetta Pantelis, APRN - CNP    cefepime (MAXIPIME) 1,000 mg in dextrose 5 % 50 mL extended infusion IVPB, 1,000 mg, IntraVENous, Q12H, Stopped at 11/18/21 0459 **AND** 0.9 % sodium chloride infusion, 25 mL, IntraVENous, Q12H, Cassidy Coleman APRN - CNP, Last Rate: 12.5 mL/hr at 11/18/21 0504, 25 mL at 11/18/21 0504    allopurinol (ZYLOPRIM) tablet 100 mg, 100 mg, Oral, Daily, Cassidy Coleman, APRN - CNP, 100 mg at 11/17/21 1431    cloZAPine (CLOZARIL) tablet 50 mg, 50 mg, Oral, Nightly, Cassidy Coleman, APRN - CNP, 50 mg at 11/17/21 2023    cycloSPORINE modified (NEORAL) capsule 100 mg, 100 mg, Oral, BID, Cassidy Coleman, APRN - CNP, 100 mg at 11/17/21 2023    desvenlafaxine succinate (PRISTIQ) extended release tablet 50 mg, 50 mg, Oral, QPM, Cassidy Coleman, APRN - CNP, 50 mg at 11/17/21 1805    docusate sodium (COLACE) capsule 100 mg, 100 mg, Oral, BID, Cassidy Coleman, APRN - CNP, 100 mg at 14/56/11 7575    folic acid (FOLVITE) tablet 1 mg, 1 mg, Oral, Daily, KATINA Brand CNP, 1 mg at 11/17/21 1431    levothyroxine (SYNTHROID) tablet 50 mcg, 50 mcg, Oral, Daily, KATINA Arroyo - CNP, 50 mcg at 11/18/21 0505    [Held by provider] lisinopril (PRINIVIL;ZESTRIL) tablet 5 mg, 5 mg, Oral, Daily, AKTINA Arroyo - CNP, 5 mg at 11/17/21 1430    metoprolol succinate (TOPROL XL) extended release tablet 50 mg, 50 mg, Oral, Daily, KATINA Arroyo - CNP, 50 mg at 11/17/21 1430    predniSONE (DELTASONE) tablet 5 mg, 5 mg, Oral, Daily, KATINA Arroyo - CNP, 5 mg at 11/17/21 1430    pregabalin (LYRICA) capsule 75 mg, 75 mg, Oral, BID, KATINA Arroyo CNP, 75 mg at 11/17/21 2023    atorvastatin (LIPITOR) tablet 10 mg, 10 mg, Oral, Daily, KATINA Arroyo - CNP, 10 mg at 11/17/21 1430    sodium chloride flush 0.9 % injection 5-40 mL, 5-40 mL, IntraVENous, 2 times per day, KATINA Arroyo CNP    sodium chloride flush 0.9 % injection 5-40 mL, 5-40 mL, IntraVENous, PRN, KATINA Arroyo CNP    0.9 % sodium chloride infusion, 25 mL, IntraVENous, PRN, KATINA Arroyo CNP    ondansetron (ZOFRAN-ODT) disintegrating tablet 4 mg, 4 mg, Oral, Q8H PRN **OR** ondansetron (ZOFRAN) injection 4 mg, 4 mg, IntraVENous, Q6H PRN, KATINA Arroyo CNP    acetaminophen (TYLENOL) tablet 650 mg, 650 mg, Oral, Q6H PRN, 650 mg at 11/17/21 2005 **OR** acetaminophen (TYLENOL) suppository 650 mg, 650 mg, Rectal, Q6H PRN, KATINA Arroyo CNP    polyethylene glycol (GLYCOLAX) packet 17 g, 17 g, Oral, Daily PRN, KATINA Arroyo CNP    heparin (porcine) injection 5,000 Units, 5,000 Units, SubCUTAneous, 3 times per day, KATINA Arroyo CNP, 5,000 Units at 11/18/21 0505    perflutren lipid microspheres (DEFINITY) injection 1.65 mg, 1.5 mL, IntraVENous, ONCE PRN, KATINA Arroyo CNP    mycophenolate (CELLCEPT) capsule 1,000 mg, 1,000 mg, Oral, BID, KATINA Arroyo CNP, 1,000 mg at 11/17/21 2023    ipratropium-albuterol (DUONEB) nebulizer solution 1 ampule, 1 ampule, Inhalation, Q4H Carson JOSUE Belle Gonzalez, APRN - CNP, 1 ampule at 11/18/21 8397  ALLERGIES     Macrodantin [nitrofurantoin macrocrystal] and Sulfa antibiotics  Immunization History   Administered Date(s) Administered    COVID-19, [place] Peter, PF, 30mcg/0.3mL 01/28/2021, 02/18/2021, 08/18/2021      Internal Administration   First Dose COVID-19, Pfizer, PF, 30mcg/0.3mL  01/28/2021   Second Dose COVID-19, Pfizer, PF, 30mcg/0.3mL   02/18/2021       Last COVID Lab SARS-CoV-2 (no units)   Date Value   01/20/2021 Not Detected     SARS-CoV-2, PCR (no units)   Date Value   11/17/2021 Not Detected     SARS-CoV-2, NAAT (no units)   Date Value   11/17/2021 Not Detected        PAST MEDICAL HISTORY     Past Medical History:   Diagnosis Date    Abnormal EKG     left bundle branch block    Acute gout involving toe of right foot 9/3/2020    Acute gout involving toe of right foot 9/3/2020    Cancer ([address]     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    Cerebrovascular disease     Clotting disorder ([address] 1985    thrombophlebitis after c section delivery    Depression     15 years followed by dr Gabriela Conrad Hyperlipidemia     Hypertension     Hyperthyroidism     Leg swelling 9/3/2020    Lymphedema of both lower extremities 9/3/2020    Peripheral vascular disease ([address]     Renal failure 11/2012    renal transplant    Thrombophlebitis     after c section delivery    Thyroid disease      SURGICAL HISTORY       Past Surgical History:   Procedure Laterality Date   [address]    one normal delivery    COLONOSCOPY      DIAGNOSTIC CARDIAC CATH LAB PROCEDURE  2006    normal coronaries and 1996 normal coronaries    DIALYSIS FISTULA CREATION  march and july 2012    phase one and two patient will start dialysis when needed   [address]    transfemoral venous bypass grafts    KIDNEY TRANSPLANT  2016    KIDNEY TRANSPLANT  2015    KIDNEY TRANSPLANT  2015    PARATHYROIDECTOMY  2006    left parathyroid  implant and parathyroidectomy ·      PHYSICAL EXAM          Vitals:    11/17/21 1823 11/17/21 1945 11/17/21 2115 11/18/21 1000   BP:  (!) 117/56  99/65   Pulse:  91  83   Resp:  17  16   Temp:  101.4 °F (38.6 °C) 100 °F (37.8 °C) 97.5 °F (36.4 °C)   TempSrc:  Axillary Axillary Oral   SpO2: 93% 92%  97%   Weight:       Height:         Physical Exam   Constitutional/General: Alert and oriented, + cough, + accessory muscle use. Head: NC/AT  Eyes: PERRL, EOMI  Mouth: Normal mucosa, no thrush   Neck: Supple, full ROM,    Pulmonary: Lungs with less wheezing noted   Cardiovascular: Tachy   Abdomen: Soft, + BS.    distension. Nontender. Extremities: Moves all extremities x 4. Warm and well perfused,++ edema   Skin: Warm and dry without rash  Neurologic:    No focal deficits  Psych: Normal Affect  PIV     DIAGNOSTIC RESULTS   RADIOLOGY:   XR CHEST (2 VW)    Result Date: 11/17/2021  EXAMINATION: TWO XRAY VIEWS OF THE CHEST 11/17/2021 9:31 am COMPARISON: None. HISTORY: ORDERING SYSTEM PROVIDED HISTORY: elevated BNP, wheezing TECHNOLOGIST PROVIDED HISTORY: Reason for exam:->elevated BNP, wheezing FINDINGS: There is prominence of the pulmonary vasculature. There is no effusion or pneumothorax. The cardiomediastinal silhouette is stable in size. The osseous structures are without acute process. Mild pulmonary vascular congestion. XR CHEST 1 VIEW    Result Date: 11/17/2021  EXAMINATION: ONE XRAY VIEW OF THE CHEST 11/17/2021 5:17 am COMPARISON: 2nd June 2020 HISTORY: ORDERING SYSTEM PROVIDED HISTORY: sepsis TECHNOLOGIST PROVIDED HISTORY: Reason for exam:->sepsis FINDINGS: Heart is mildly enlarged. Pulmonary vascularity is normal.  Lungs are clear. Neither costophrenic angle is blunted. Mild cardiomegaly.      LABS  Recent Labs     11/17/21  0556 11/18/21  0543   WBC 13.5* 12.9*   HGB 13.8 13.8   HCT 46.7 45.7   .1* 105.5*   PLT 71* 78*     Recent Labs     11/17/21  0802 11/17/21  0802 11/17/21  1117 11/17/21  1117 11/18/21  0543   --  138  --  138   K 4.2   < > 4.5   < > 4.8      < > 101   < > 101   CO2 25   < > 24   < > 23   BUN 17  --  18  --  30*   CREATININE 1.7*  --  1.7*  --  2.1*   GFRAA 36  --  36  --  29   LABGLOM 30  --  30  --  24   GLUCOSE 107*  --  87  --  89   PROT 6.2*  --  6.1*  --  6.1*   LABALBU 3.7  --  3.4*  --  3.2*   CALCIUM 9.7  --  9.6  --  9.6   BILITOT 0.7  --  0.6  --  0.8   ALKPHOS 73  --  62  --  59   AST 15  --  20  --  29   ALT 6  --  7  --  8    < > = values in this interval not displayed. Lab Results   Component Value Date    COVID19 Not Detected 11/17/2021     COVID-19/MARINA-COV2 LABS  Recent Labs     11/17/21  0556 11/17/21  0802 11/17/21  1117 11/17/21  1416 11/18/21  0543   CRP  --   --   --  34.6*  --    PROCAL  --   --   --  2.12*  --    LDH  --   --   --  299*  --    DDIMER  --   --   --  836  --    INR 1.1  --   --   --   --    PROTIME 12.9*  --   --   --   --    AST  --  15 20  --  29   ALT  --  6 7  --  8     MICROBIOLOGY:     Cultures :   Lab Results   Component Value Date    BC 24 Hours no growth 11/17/2021    BC  12/17/2019     Refer to previous culture of CXBLD from 12-17-19 @1120 for  susceptibility results      ORG Escherichia coli 01/20/2021    ORG Klebsiella pneumoniae ssp pneumoniae 12/17/2019    ORG Klebsiella pneumoniae ssp pneumoniae 12/17/2019    ORG Klebsiella pneumoniae ssp pneumoniae 12/17/2019     Lab Results   Component Value Date    BLOODCULT2  11/17/2021     Gram stain performed from blood culture bottle media  Gram positive cocci in clusters  Gram negative rods      ORG Escherichia coli 01/20/2021    ORG Klebsiella pneumoniae ssp pneumoniae 12/17/2019    ORG Klebsiella pneumoniae ssp pneumoniae 12/17/2019    ORG Klebsiella pneumoniae ssp pneumoniae 12/17/2019     Urine Culture, Routine   Date Value Ref Range Status   04/09/2021   Final    10 to 100,000 CFU/mL  Mixed rubio isolated.  Further workup and sensitivity testing  is not routinely indicated and will not be performed. Mixed rubio isolated includes:  Escherichia coli  Mixed gram positive organisms  Alpha hemolytic Strep species  Yeast  Nonhemolytic Strep species     01/20/2021 >100,000 CFU/ml  Final   02/05/2020   Final    ,000 CFU/mL  Mixed rubio isolated. Further workup and sensitivity testing  is not routinely indicated and will not be performed. Mixed rubio isolated includes:  Mixed gram positive organisms       FINAL IMPRESSION    Patient is a 73 y.o. female who presented with   Chief Complaint   Patient presents with    Urinary Tract Infection    and admitted for Sepsis due to urinary tract infection ([facility] [A41.9, N39.0]  Urinary tract infection without hematuria, site unspecified [N39.0]  Sepsis, due to unspecified organism, unspecified whether acute organ dysfunction present ([facility] [A41.9]  · Fevers   · Leukocytosis   · UTI/ dysuria   · Gn septicemia/prob pyelonephritis   · History of kidney transplant   · Rule out viral infection   resp viral panel neg   · Immunocompromised patient     Plan:   · Continue Cefepime for now- adjusted for renal  · Check MRSA screen  · Monitor labs  · Check cultures  · Check bladder scan not done hassan to be placed 250cc out   · Respiratory panel- pending  · Check renal ultrasound NAP  - may need CT of A/P   · Monitor labs - check BNP - 2666  · Check ldh, procal, crp, ddimer       Imaging and labs were reviewed. Monik Burgess was informed of their diagnosis, indications, risks and benefits of treatment. Monik Burgess had the opportunity to ask questions. All questions were answered. Thank you for involving me in the care of Monik Burgess. Please do not hesitate to call for any questions or concerns.     Electronically signed by Camille Graves MD on 11/18/2021 at 11:23 AM    Phone [phone]  Fax [phone]

## 2021-11-18 NOTE — CONSULTS
Inpatient Cardiology Consultation      Reason for Consult:  Elevated troponin    Consulting Physician: Dr. China Calero    Requesting Physician:  Diana Jasmine    Date of Consultation: 11/18/2021    HISTORY OF PRESENT ILLNESS:       This 67-year-old female is known to [facility] cardiology and is followed by Hyun Nichols. She was last evaluated as an outpatient in the office on August 16 of this year. She has a history of nonischemic cardiomyopathy and congestive heart failure. She also has a history of a renal transplant and follows with Dr. Courtney Alvarez. She presented to the emergency room with weakness that started Tues. , dysuria and urinary frequency. She had slid off the couch to call paramedics and then could not get off the floor. Paramedics found her hypoxic at 88% and applied nasal cannula. She denies chest pain, dyspnea, and has intermittent swelling of the lower extremities. Blood pressure on admission 151/85 and temperature 100.9 °F and tachycardic at 107 and O2 saturation 99% on nasal cannula. Potassium was 4.5 and BUN and creatinine 18 and 1.7 (baseline creatinine 1.28 from October 2021), lactic acid 2.1, CRP 34.6, BNP 2666, troponin 52-88, thyroid studies within normal limits and WBCs 13.5, H&H 13.8 and 46.7 and platelets 71, D-dimer 836, influenza panel normal and positive for urinary tract infection. Chest x-ray mild pulmonary vascular congestion. Renal ultrasound showed no hydronephrosis on CT of the abdomen and pelvis showed a possible ileus and obstruction, ultrasound of the lower extremities showed no DVT    EKG showed sinus tachycardia with left axis and left bundle branch block. She had a run of NSVT last pm. K was 4.8 and mag I.6 this am with worsening renal function with creatinine 2.1    Repeat EKG sinus rhythm with a left axis in the left bundle branch block. 2D echocardiogram has been ordered. CARDIOVASCULAR HISTORY:         1.  Former smoker, quit in 2015 with a 20-pack-year history  2. HTN  3. Hyperlipidemia and on a statin  4. Obesity  5. HLD  6. LBBB  7. Thrombophlebitis after c section  8. Femoral bypass 1988  9. depression  10. Cardiac catheterization 2006- normal coronaries  11. Nonischemic Cardiomyopathy since at least 2013  12. lexiscan stress test 6/6/2015 fixed septal as well as apical defectwith no reversible ischemia, inferior wall mild hypokinesis, abnormal septal motion, EF 50%  13. 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 Hypothyroidism  15. Hyperparathyroidism and parathyroidectomy 2006  16. 2D echo July 10, 2017  Left ventricular size is grossly normal.              mild global hypokinesis, abnormal septal motion, EF 35-38%.  Mild LVH.              Left atrium is of normal size.                          Interatrial septum not well visualized but appears intact.                           Normal right ventricle structure and function.                           Normal mitral valve structure.                          Mild mitral regurgitation.               No mitral valve prolapse.                          Aortic valve sclerosis.               Normal aortic root and ascending aorta.              No evidence of pericardial effusion.              Compared to prior Echo from 2013, EF  46% at that time. 17. 2d echo December 17, 2019   Technically sub-optimal images. Normal left ventricular chamber size. Mild to moderate global hypokinesis, LVEF estimated about 35-38%. Mild left ventricular concentric hypertrophy noted. There is doppler evidence of stage I diastolic dysfunction. Normal left atrial pressure. Left atrium is of normal size. Interatrial septum not well visualized but appears intact. Normal right ventricle structure and function. No mitral valve prolapse. Normal aortic root size. No evidence of pericardial effusion.    Pericardium appears normal.   The inferior vena cava diameter is normal with decreased respiratory   variation. No intracardiac mass. Compared to prior study from 2017 - which showed EF 35-38% and mild MR  18. Admission for urinary tract infection, sepsis December 2019  19. Nonsustained ventricular tachycardia most likely in the setting of #17 and magnesium was low  20. Lymphedema  21. Gout  22. Thrombocytopenia with platelets 353 on October 25, 2021.         Medications Prior to admit:  Prior to Admission medications    Medication Sig Start Date End Date Taking? Authorizing Provider   pregabalin (LYRICA) 75 MG capsule Take 75 mg by mouth 2 times daily. Yes Historical Provider, MD   allopurinol (ZYLOPRIM) 100 MG tablet Take 100 mg by mouth daily   Yes Historical Provider, MD   Cyanocobalamin (B-12 COMPLIANCE INJECTION) 1000 MCG/ML KIT Inject as directed monthly   Yes Historical Provider, MD   metoprolol succinate (TOPROL XL) 50 MG extended release tablet take 1 tablet by mouth once daily 4/17/20  Yes Yue Jordan MD   desvenlafaxine succinate (PRISTIQ) 50 MG TB24 extended release tablet Take 1 tablet by mouth every evening 12/20/19  Yes Mik Le MD   cloZAPine (CLOZARIL) 50 MG tablet Take 50 mg by mouth nightly   Yes Historical Provider, MD   cycloSPORINE (SANDIMMUNE) 100 MG capsule Take 100 mg by mouth 2 times daily   Yes Historical Provider, MD   predniSONE (DELTASONE) 5 MG tablet Take 5 mg by mouth daily   Yes Historical Provider, MD   lisinopril (PRINIVIL;ZESTRIL) 5 MG tablet Take 5 mg by mouth daily   Yes Historical Provider, MD   folic acid (FOLVITE) 1 MG tablet Take 1 mg by mouth daily   Yes Historical Provider, MD   docusate sodium (COLACE) 100 MG capsule Take 100 mg by mouth 2 times daily   Yes Historical Provider, MD   mycophenolate (CELLCEPT) 500 MG tablet Take 1,000 mg by mouth 2 times daily   Yes Historical Provider, MD   simvastatin (ZOCOR) 20 MG tablet Take 20 mg by mouth nightly.      Yes Historical Provider, MD levothyroxine (SYNTHROID) 50 MCG tablet Take 50 mcg by mouth daily.      Yes Historical Provider, MD   furosemide (LASIX) 20 MG tablet Take 20 mg by mouth daily as needed (Swelling)    Historical Provider, MD       Current Medications:    Current Facility-Administered Medications: cefepime (MAXIPIME) 1,000 mg in dextrose 5 % 50 mL extended infusion IVPB, 1,000 mg, IntraVENous, Q12H **AND** 0.9 % sodium chloride infusion, 25 mL, IntraVENous, Q12H  allopurinol (ZYLOPRIM) tablet 100 mg, 100 mg, Oral, Daily  cloZAPine (CLOZARIL) tablet 50 mg, 50 mg, Oral, Nightly  cycloSPORINE modified (NEORAL) capsule 100 mg, 100 mg, Oral, BID  desvenlafaxine succinate (PRISTIQ) extended release tablet 50 mg, 50 mg, Oral, QPM  docusate sodium (COLACE) capsule 100 mg, 100 mg, Oral, BID  folic acid (FOLVITE) tablet 1 mg, 1 mg, Oral, Daily  levothyroxine (SYNTHROID) tablet 50 mcg, 50 mcg, Oral, Daily  [Held by provider] lisinopril (PRINIVIL;ZESTRIL) tablet 5 mg, 5 mg, Oral, Daily  metoprolol succinate (TOPROL XL) extended release tablet 50 mg, 50 mg, Oral, Daily  predniSONE (DELTASONE) tablet 5 mg, 5 mg, Oral, Daily  pregabalin (LYRICA) capsule 75 mg, 75 mg, Oral, BID  atorvastatin (LIPITOR) tablet 10 mg, 10 mg, Oral, Daily  sodium chloride flush 0.9 % injection 5-40 mL, 5-40 mL, IntraVENous, 2 times per day  sodium chloride flush 0.9 % injection 5-40 mL, 5-40 mL, IntraVENous, PRN  0.9 % sodium chloride infusion, 25 mL, IntraVENous, PRN  ondansetron (ZOFRAN-ODT) disintegrating tablet 4 mg, 4 mg, Oral, Q8H PRN **OR** ondansetron (ZOFRAN) injection 4 mg, 4 mg, IntraVENous, Q6H PRN  acetaminophen (TYLENOL) tablet 650 mg, 650 mg, Oral, Q6H PRN **OR** acetaminophen (TYLENOL) suppository 650 mg, 650 mg, Rectal, Q6H PRN  polyethylene glycol (GLYCOLAX) packet 17 g, 17 g, Oral, Daily PRN  heparin (porcine) injection 5,000 Units, 5,000 Units, SubCUTAneous, 3 times per day  perflutren lipid microspheres (DEFINITY) injection 1.65 mg, 1.5 mL, IntraVENous, ONCE PRN  mycophenolate (CELLCEPT) capsule 1,000 mg, 1,000 mg, Oral, BID  ipratropium-albuterol (DUONEB) nebulizer solution 1 ampule, 1 ampule, Inhalation, Q4H WA    Allergies:  Macrodantin [nitrofurantoin macrocrystal] and Sulfa antibiotics    Social History:  Hx tobacco abuse, no alcohol or illicit drugs, lives alone      Family History:   Family History   Problem Relation Age of Onset    Diabetes Mother     Diabetes Father     Dementia Father        REVIEW OF SYSTEMS:     · Constitutional: positive fatigue, fevers, chills or night sweats  · Eyes: Denies visual changes or drainage  · ENT: Denies headaches or hearing loss. No mouth sores or sore throat. No epistaxis   · Cardiovascular: Denies chest pain, pressure or palpitations. No lower extremity swelling. · Respiratory: Denies PUGA, cough, orthopnea or PND. No hemoptysis   · Gastrointestinal: Denies hematemesis or anorexia. No hematochezia or melena    · Genitourinary:positive dysuria  · Musculoskeletal: Denies gait disturbance, weakness or joint complaints  · Integumentary: Denies rash, hives or pruritis   · Neurological: Denies dizziness, headaches or seizures. No numbness or tingling  · Psychiatric: Denies anxiety or depression. · Endocrine: Denies temperature intolerance. No recent weight change. .  · Hematologic/Lymphatic: Denies abnormal bruising or bleeding. No swollen lymph nodes    PHYSICAL EXAM:   BP (!) 117/56   Pulse 91   Temp 100 °F (37.8 °C) (Axillary)   Resp 17   Ht 5' 2\" (1.575 m)   Wt 227 lb 3.2 oz (103.1 kg) Comment: bed scale  SpO2 92%   BMI 41.56 kg/m²   CONST:  Well developed, well nourished who appears of stated age. Awake, alert and cooperative. Appears weak  HEENT:   Head- Normocephalic, atraumatic   Eyes- Conjunctivae pink, anicteric  Throat- Oral mucosa pink and moist  Neck-  No stridor, trachea midline, no jugular venous distention. No carotid bruit. CHEST: Chest symmetrical and non-tender to palpation.  No accessory muscle use or intercostal retractions  RESPIRATORY: Lung sounds - coarse and wheezy  CARDIOVASCULAR:     Heart Inspection- shows no noted pulsations  Heart Palpation- no heaves or thrills; PMI is non-displaced   Heart Ausculation- Regular rate and rhythm, 2/6 murmur. No s3, s4 or rub   PV: Trace lower extremity edema. No varicosities. Pedal pulses palpable, no clubbing or cyanosis   ABDOMEN: Soft, non-tender to light palpation. Bowel sounds present. No palpable masses no organomegaly; no abdominal bruit  MS: Good muscle strength and tone. No atrophy or abnormal movements. : Deferred  SKIN: Warm and dry no statis dermatitis or ulcers   NEURO / PSYCH: Oriented to person, place and time. Speech clear and appropriate. Follows all commands.  Pleasant affect     DATA:    ECG / Tele strips: NSVT last pm now SR  Diagnostic:      Intake/Output Summary (Last 24 hours) at 11/18/2021 0746  Last data filed at 11/18/2021 0505  Gross per 24 hour   Intake 50 ml   Output --   Net 50 ml       Labs:   CBC:   Recent Labs     11/17/21  0556 11/18/21  0543   WBC 13.5* 12.9*   HGB 13.8 13.8   HCT 46.7 45.7   PLT 71* 78*     BMP:   Recent Labs     11/17/21  1117 11/18/21  0543    138   K 4.5 4.8   CO2 24 23   BUN 18 30*   CREATININE 1.7* 2.1*   LABGLOM 30 24   CALCIUM 9.6 9.6     Mag:   Recent Labs     11/18/21  0543   MG 1.6     Phos:   Recent Labs     11/18/21  0543   PHOS 4.9*     TFT:   Lab Results   Component Value Date    TSH 0.376 11/17/2021    T4FREE 1.08 11/17/2021      HgA1c:   Lab Results   Component Value Date    LABA1C 5.7 (H) 11/17/2021     No results found for: EAG  proBNP:   Recent Labs     11/17/21  0556   PROBNP 2,666*     PT/INR:   Recent Labs     11/17/21  0556   PROTIME 12.9*   INR 1.1     APTT:  Recent Labs     11/17/21  0556   APTT 20.0*     CARDIAC ENZYMES:  Recent Labs     11/17/21  1117 11/17/21  1912   TROPHS 52* 88*     FASTING LIPID PANEL:No results found for: CHOL, HDL, LDLDIRECT, LDLCALC, TRIG  LIVER PROFILE:  Recent Labs     11/17/21  1117 11/18/21  0543   AST 20 29   ALT 7 8   LABALBU 3.4* 3.2*                 ASSESSMENT / DIAGNOSIS: Discussed with Dr. Emelina Collazo  1. Acute on chronic HFrEF, hypoxia, hx Nonischemic cardiomyopathy since 2013 and last EF 35-38% 12/2019  2. Elevated troponin in the setting of sepsis, with no chest pain or EKG changes  3.         UTI, sepsis  4. 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 NSVT/ hypomagnesiumemia   6. LBBB, LAD chronic/ Sinus tachycardia on admission in the setting of febrile fever, sepsis, now resolved  7. HTN controlled  8. HLD, on a statin  9.         thrombocytopenia   10. Obesity  11. Depression  12. Hypothyroidism  13. Hx parathyroidectomy  14. Hx clotting disorder with thrombophlebitis remotely with no Dvt on ultrasound  15. Former smoker  12. PAD S/p femoral bypass    PLAN  1. Replace magnesium, keep K  4 and magnesium 2  2. Diuresis per nephrology  3. Review echo  4. Resume Lisinopril per nephrology  5. Consider ischemic evaluation prior to discharge  6. Electronically signed by KATINA Rodriguez CNP on 11/18/2021 at 7:46 AM   ______________________________________________________________________  Cardiology attending attestation:  I have independently interviewed and examined the patient. I personally reviewed pertinent laboratory values and diagnostic testing (including, if applicable, chest xray, electrocardiogram, telemetry, echocardiogram, stress testing, and coronary angiography). I have reviewed the above documentation completed by HARISH Rodriguez including past medical, social, and family history and agree with the findings, assessment and plan except where noted. Plan formulated under my direct supervision. I participated in all aspects of the medical decision making.   Please see my additional contributions to the HPI, physical exam, and assessment / medical decision making:  _______________________________________________________________________    Patient presenting with fevers and generalized weakness. Denies any chest pain. Troponin elevated in the context of sepsis, bacteremia and CECILY/CKD with history of renal transplant. She has chronic left bundle branch block that is unchanged. Physical Exam:  BP 99/65   Pulse 83   Temp 97.5 °F (36.4 °C) (Oral)   Resp 16   Ht 5' 2\" (1.575 m)   Wt 227 lb 3.2 oz (103.1 kg) Comment: bed scale  SpO2 97%   BMI 41.56 kg/m²   General appearance: Elderly female, weak appearing, no acute respiratory distress  Skin: Intact, no rash  ENMT: Moist mucous membranes  Neck: Supple, no carotid bruits. Normal jugular venous pressure  Lungs: Bibasilar rales  Cardiac: Regular rhythm with a normal rate, normal S1&S2, no murmurs apparent  Abdomen: Soft, positive bowel sounds, nontender  Extremities: Moves all extremities x 4, trace lower extremity edema  Neurologic: No focal motor deficits apparent, normal mood and affect    Telemetry: Sinus rhythm 90s; episode of nonsustained irregular wide-complex tachycardia, possibly nonsustained A. fib    [id number]: Sinus rhythm 79 beats minute. Left bundle branch block with left axis. QRS duration 146 ms    Impression:   1. Elevated troponin: Likely acute myocardial injury in the context of sepsis/bacteremia/acute renal failure. No evidence of ACS, no chest pain  2. Reported nonischemic cardiomyopathy  3. Bacteremia    Recommendations:     Check echo   Continue beta-blocker   Replace magnesium   Nonurgent ischemic evaluation, prior to discharge or as outpatient    Thank you for the consultation. Please do not hesitate to call with questions.     Milton Nevarez MD, [employer] [facility] Cardiology

## 2021-11-18 NOTE — PROGRESS NOTES
Physical Therapy Initial Evaluation/Plan of Care    Room #:  9294/1970-42  Patient Name: Maria Antonia Gonzalez  YOB: 1955  MRN: [id number]    Date of Service: 11/18/2021     Tentative placement recommendation: Subacute rehab  Equipment recommendation: To be determined      Evaluating Physical Therapist: Dayna Davis #[id number]      Specific Provider Orders/Date/Referring Provider :   11/17/21 1200   PT evaluation and treat Start: 11/17/21 1200, End: 11/17/21 1200, ONE TIME, Standing Count: 1 Occurrences, R    Ana Ra, APRN - CNP    Admitting Diagnosis:   Sepsis due to urinary tract infection ([facility] [A41.9, N39.0]  Urinary tract infection without hematuria, site unspecified [N39.0]  Sepsis, due to unspecified organism, unspecified whether acute organ dysfunction present [facility]) [A41.9]      Surgery: none  Visit Diagnoses       Codes    Sepsis, due to unspecified organism, unspecified whether acute organ dysfunction present [facility])    -  Primary A41.9    Urinary tract infection without hematuria, site unspecified     N39.0          Patient Active Problem List   Diagnosis    Peripheral vascular disease ([address]    Depression    Clotting disorder ([address]    Abnormal EKG    Cancer ([address]    Over weight    S/p cadaver renal transplant    ESRD (end stage renal disease) ([address]    Non morbid obesity due to excess calories    Hypertension    Leg swelling    Lymphedema of both lower extremities    Acute gout involving toe of right foot    Sepsis secondary to UTI ([address]    Acute kidney injury superimposed on CKD ([address]    Thyroid disease    Acute on chronic combined systolic (congestive) and diastolic (congestive) heart failure ([address]        ASSESSMENT of Current Deficits Patient exhibits decreased strength, balance and endurance impairing functional mobility, transfers and gait .  Patient very shaky, attempted to sit up on EOB but unsafe due to posterior lean, desaturation, and all extremities present with jerky movement which pateint reports is new starting yesterday. Patient reports she was able to walk 2-3 days ago and now cannot. Two person assist for function. Patient was on room air when entering the room, SPO2 75%, nursing made aware, nursing came into room and patient was placed on 3L improved to 90%. Very fatigued, weak, and demonstrated decreased coordination. The patient will benefit from continued skilled therapy to increase strength and improve balance for safe functional mobility, to decrease risk of falls, and to meet goals at discharge. PHYSICAL THERAPY  PLAN OF CARE       Physical therapy plan of care is established based on physician order,  patient diagnosis and clinical assessment    Current Treatment Recommendations:    -Bed Mobility: Lower extremity exercises   -Sitting Balance: Incorporate reaching activities to activate trunk muscles   -Standing Balance: Perform strengthening exercises in standing to promote motor control with or without upper extremity support   -Transfers: Provide instruction on proper hand and foot position for adequate transfer of weight onto lower extremities and use of gait device if needed and Cues for hand placement, technique and safety. Provide stabilization to prevent fall   -Gait: Gait training and Standing activities to improve: base of support, weight shift, weight bearing      PT long term treatment goals are located in below grid    Patient and or family understand(s) diagnosis, prognosis, and plan of care. Frequency of treatments: Patient will be seen  daily.          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    one normal delivery    COLONOSCOPY      DIAGNOSTIC CARDIAC CATH LAB PROCEDURE  2006    normal coronaries and 1996 normal coronaries    DIALYSIS FISTULA CREATION  march and july 2012    phase one and two patient will start dialysis when needed   [address]    transfemoral venous bypass grafts    KIDNEY TRANSPLANT  2016   [facility]  2015   [facility]  2015    PARATHYROIDECTOMY  2006    left parathyroid  implant and parathyroidectomy       SUBJECTIVE:    Precautions:  Up with assistance, falls , shaky movement all extremities, desat quickly    Social history: Patient lives alone in a apartment 9th, elevator access  with No steps  to enter  Tub shower grab bars    Equipment owned: [employer] chair,      [facility]   How much difficulty turning over in bed?: A Little  How much difficulty sitting down on / standing up from a chair with arms?: A Lot  How much difficulty moving from lying on back to sitting on side of bed?: A Lot  How much help from another person moving to and from a bed to a chair?: A Lot  How much help from another person needed to walk in hospital room?: A Lot  How much help from another person for climbing 3-5 steps with a railing?: Total  AM-PAC Inpatient Mobility Raw Score : 12  AM-PAC Inpatient T-Scale Score : 35.33  Mobility Inpatient CMS 0-100% Score: 68.66  Mobility Inpatient CMS G-Code Modifier : CL    Nursing cleared patient for PT evaluation. The admitting diagnosis and active problem list as listed above have been reviewed prior to the initiation of this evaluation. OBJECTIVE;   Initial Evaluation  Date: 11/18/2021 Treatment Date:     Short Term/ Long Term   Goals   Was pt agreeable to Eval/treatment? Yes  To be met in 4 days   Pain level   0/10  Very fatigued      Bed Mobility    Rolling: Moderate assist of 1    Supine to sit: Moderate assist of 1    Sit to supine: Moderate assist of 1    Scooting: Moderate assist of 1    Rolling: Independent    Supine to sit: Independent    Sit to supine: Independent    Scooting: Independent     Transfers Sit to stand: Not assessed  d/t to pt overall debility, decreased activity tolerance, balance deficits, safety and fall risk. Sit to stand:  Moderate assist of 1    Ambulation    not assessed    5 feet using  wheeled walker with Moderate assist of 1          ROM Within functional limits    Increase range of motion 10% of affected joints    Strength BUE:  refer to OT eval  RLE:  4-/5  LLE:  4-/5  Increase strength in affected mm groups by 1/3 grade   Balance Sitting EOB:  poor   Dynamic Standing:  not assessed   Sitting EOB:  fair   Dynamic Standing: fair      Patient is Alert & Oriented x person, place, time and situation and follows directions    Sensation:  Patient  denies numbness/tingling   Edema:  no   Endurance: fair  -    Vitals: 3 liters nasal cannula   Blood Pressure at rest  Blood Pressure during session    Heart Rate at rest  Heart Rate during session    SPO2 at rest 90%  SPO2 during session 75 on room air  3L 85-90%     Patient education  Patient educated on role of Physical Therapy, risks of immobility, safety and plan of care,  importance of mobility while in hospital , purse lip breathing, ankle pumps, quad set and glut set for edema control, blood clot prevention and safety      Patient response to education:   Pt verbalized understanding Pt demonstrated skill Pt requires further education in this area   Yes Partial Yes      Treatment:  Patient practiced and was instructed/facilitated in the following treatment: Patient   Attempted to sit EOB, unsafe due to lack of balance and desaturation. Patient performed supine exercises. jerky movements       Therapeutic Exercises:  ankle pumps, heel slide, hip abduction/adduction and straight leg raise  x 10 reps. At end of session, patient in bed with doctor present call light and phone within reach,  all lines and tubes intact, nursing notified. Patient would benefit from continued skilled Physical Therapy to improve functional independence and quality of life. Patient's/ family goals   rehab    Time in  335  Time out  359    Total Treatment Time  0 minutes    Evaluation time includes thorough review of current medical information, gathering information on past medical history/social history and prior level of function, completion of standardized testing/informal observation of tasks, assessment of data, and development of Plan of care and goals.      CPT codes:  Low Complexity PT evaluation (54422)  No treatment billed    Jamee Mckeon, PT

## 2021-11-18 NOTE — PROGRESS NOTES
[address] [place], [place] [address]ist   Progress Note    Admitting Date and Time: 11/17/2021  4:50 AM  Admit Dx: Sepsis due to urinary tract infection ([facility] [A41.9, N39.0]  Urinary tract infection without hematuria, site unspecified [N39.0]  Sepsis, due to unspecified organism, unspecified whether acute organ dysfunction present ([facility] [address] [A41.9]    Subjective:    Patient continues to require supplemental oxygen at 2 liters. She was febrile last night. Blood cultures are growing Gram positive cocci in clusters and Gram negative rods. ROS: denies  n/v, HA unless stated above.      cefepime (MAXIPIME) IVPB extended  1,000 mg IntraVENous Q12H    allopurinol  100 mg Oral Daily    cloZAPine  50 mg Oral Nightly    cycloSPORINE modified  100 mg Oral BID    desvenlafaxine succinate  50 mg Oral QPM    docusate sodium  100 mg Oral BID    folic acid  1 mg Oral Daily    levothyroxine  50 mcg Oral Daily    [Held by provider] lisinopril  5 mg Oral Daily    metoprolol succinate  50 mg Oral Daily    predniSONE  5 mg Oral Daily    pregabalin  75 mg Oral BID    atorvastatin  10 mg Oral Daily    sodium chloride flush  5-40 mL IntraVENous 2 times per day    heparin (porcine)  5,000 Units SubCUTAneous 3 times per day    mycophenolate  1,000 mg Oral BID    ipratropium-albuterol  1 ampule Inhalation Q4H WA     sodium chloride flush, 5-40 mL, PRN  sodium chloride, 25 mL, PRN  ondansetron, 4 mg, Q8H PRN   Or  ondansetron, 4 mg, Q6H PRN  acetaminophen, 650 mg, Q6H PRN   Or  acetaminophen, 650 mg, Q6H PRN  polyethylene glycol, 17 g, Daily PRN  perflutren lipid microspheres, 1.5 mL, ONCE PRN         Objective:    BP 99/65   Pulse 83   Temp 97.5 °F (36.4 °C) (Oral)   Resp 16   Ht 5' 2\" (1.575 m)   Wt 227 lb 3.2 oz (103.1 kg) Comment: bed scale  SpO2 97%   BMI 41.56 kg/m²     General Appearance: alert and oriented to person, place and time and in no acute distress  Skin: warm and dry  Head: normocephalic and atraumatic  Eyes: pupils equal, round, and reactive to light, conjunctivae normal  Neck: neck supple and non tender without mass   Pulmonary/Chest:  wheezes, no respiratory distress  Cardiovascular: regular rate, normal S1 and S2   Abdomen: soft, non-tender, non-distended, normal bowel sounds  Extremities: no cyanosis, no clubbing and 1+ bilateral lower extremity edema  Neurologic: no cranial nerve deficit and speech normal    Recent Labs     11/17/21  0802 11/17/21  1117 11/18/21  0543    138 138   K 4.2 4.5 4.8    101 101   CO2 25 24 23   BUN 17 18 30*   CREATININE 1.7* 1.7* 2.1*   GLUCOSE 107* 87 89   CALCIUM 9.7 9.6 9.6       Recent Labs     11/17/21  0802 11/17/21  1117 11/18/21  0543   ALKPHOS 73 62 59   PROT 6.2* 6.1* 6.1*   LABALBU 3.7 3.4* 3.2*   BILITOT 0.7 0.6 0.8   AST 15 20 29   ALT 6 7 8       Recent Labs     11/17/21  0556 11/18/21  0543   WBC 13.5* 12.9*   RBC 4.36 4.33   HGB 13.8 13.8   HCT 46.7 45.7   .1* 105.5*   MCH 31.7 31.9   MCHC 29.6* 30.2*   RDW 14.5 14.6   PLT 71* 78*   MPV 13.8* 13.8*           Radiology:   US DUP LOWER EXTREMITIES BILATERAL VENOUS   Final Result   No evidence of DVT in either lower extremity. CT ABDOMEN PELVIS WO CONTRAST Additional Contrast? None   Final Result   Limited evaluation for pyelonephritis given lack of IV contrast.  Allowing   for this limitation, the findings are as follows: The transplant kidney demonstrates perinephric stranding, which can be seen   in the setting of infection/inflammation. No hydronephrosis. Atrophic native kidneys with multiple cysts. Recommend nonemergent renal   ultrasound for further evaluation of an intermediate density structure in the   right kidney. Mildly dilated loops of proximal small bowel are noted, with decompressed   distal small bowel but no discrete transition point. This is favored to   reflect ileus, however obstruction cannot be excluded.   Recommend monitoring   of bowel function and follow-up KUBs. Cardiomegaly. US RETROPERITONEAL COMPLETE   Final Result   TRANSPLANT KIDNEY: No evidence of hydronephrosis. Unremarkable echogenicity. NATIVE KIDNEYS: Findings of chronic medical renal disease. No evidence of   hydronephrosis. Small left renal cysts. XR CHEST (2 VW)   Final Result   Mild pulmonary vascular congestion. XR CHEST 1 VIEW   Final Result   Mild cardiomegaly. Assessment:  Principal Problem:    Sepsis secondary to UTI [facility])  Active Problems:    Depression    Hypertension    Acute kidney injury superimposed on CKD ([facility] [address]    Thyroid disease    Acute on chronic combined systolic (congestive) and diastolic (congestive) heart failure (HCC)  Resolved Problems:    * No resolved hospital problems. *      Plan:  1. Sepsis secondary to Pyelonephritis/UTI:  WBC 13.5, Temp 101.7, heart rate 107, Urinalysis was positive for a UTI and blood cultures are positive for Gram positive cocci in clusters and Gram negative rods. Urine culture growing Gram negative rods. CT of the abd/pelvis showed perinephric stranding in transplant kidney. ID following. Continue Cefepime. 2. Acute respiratory failure with hypoxia:  Pulse ox was 88% on room air. O2 3 liters applied. Chest xray showed pulmonary vascular congestion. Respiratory film array was negative. She has been weaned to 2 liters. 3. Acute on chronic systolic and diastolic heart failure:  Pro-BNP greater than 2000, Last Echo on 12/21/19 revealed an EF of 06-54%, stage I diastolic dysfunction. Chest xray with pulmonary vascular congestion. Repeat Echo ordered. Lasix 20 mg IV x 1 on 11/17/21.   4. Bacteremia:  Blood cultures positive for Gram positive cocci in clusters and Gram negative rods. ID following. 5. Acute on chronic kidney disease in a patient with a history of kidney transplan:  Kidney transplant in 2015. Creatinine was 1.7 on admission and is now 2.1.   Insert hassan due to need for accurate I&O. Consult Nephrology. 6. Bilateral lower extremity edema:  D dimer was 836. Ultrasound of the lower extremities negative for DVT. Unable to do a CTA of the chest due to elevated creatinine. Check VQ scan. 7. Depression: Continue home medications. 8. Elevated troponin level:  Troponin levels: 52->88->56. Cardiology was consulted and likely acute myocardial injury secondary to sepsis, bacteremia and acute renal failure. Non-emergent stress test prior to discharge vs outpatient. 9. Hyperparathyroidism and parathyroidectomy 2006:  TSH 0.376 and free T4 1.08. NOTE: This report was transcribed using voice recognition software. Every effort was made to ensure accuracy; however, inadvertent computerized transcription errors may be present.      Electronically signed by KATINA Tran CNP on 11/18/2021 at 3:15 PM

## 2021-11-18 NOTE — PROGRESS NOTES
Called and left message with office staff for Dr. Guy Chen in regards to patient's blood cultures being positive.   Electronically signed by Jeanette Luciano RN on 11/18/2021 at 11:11 AM

## 2021-11-19 ENCOUNTER — APPOINTMENT (OUTPATIENT)
Dept: GENERAL RADIOLOGY | Age: 66
DRG: 871 | End: 2021-11-19
Payer: MEDICARE

## 2021-11-19 ENCOUNTER — APPOINTMENT (OUTPATIENT)
Dept: NUCLEAR MEDICINE | Age: 66
DRG: 871 | End: 2021-11-19
Payer: MEDICARE

## 2021-11-19 ENCOUNTER — APPOINTMENT (OUTPATIENT)
Dept: CT IMAGING | Age: 66
DRG: 871 | End: 2021-11-19
Payer: MEDICARE

## 2021-11-19 LAB
ALBUMIN SERPL-MCNC: 3.2 G/DL (ref 3.5–5.2)
ALP BLD-CCNC: 60 U/L (ref 35–104)
ALT SERPL-CCNC: 14 U/L (ref 0–32)
ANION GAP SERPL CALCULATED.3IONS-SCNC: 12 MMOL/L (ref 7–16)
AST SERPL-CCNC: 44 U/L (ref 0–31)
B.E.: 1.6 MMOL/L (ref -3–3)
BASOPHILS ABSOLUTE: 0 E9/L (ref 0–0.2)
BASOPHILS RELATIVE PERCENT: 0 % (ref 0–2)
BILIRUB SERPL-MCNC: 0.5 MG/DL (ref 0–1.2)
BUN BLDV-MCNC: 44 MG/DL (ref 6–23)
CALCIUM SERPL-MCNC: 9.7 MG/DL (ref 8.6–10.2)
CHLORIDE BLD-SCNC: 101 MMOL/L (ref 98–107)
CHLORIDE URINE RANDOM: <20 MMOL/L
CO2: 25 MMOL/L (ref 22–29)
CREAT SERPL-MCNC: 2.3 MG/DL (ref 0.5–1)
CREATININE URINE: 154 MG/DL (ref 29–226)
DELIVERY SYSTEMS: ABNORMAL
DEVICE: ABNORMAL
EOSINOPHILS ABSOLUTE: 0.12 E9/L (ref 0.05–0.5)
EOSINOPHILS RELATIVE PERCENT: 1 % (ref 0–6)
FIO2 ARTERIAL: 2
FOLATE: >20 NG/ML (ref 4.8–24.2)
GFR AFRICAN AMERICAN: 26
GFR NON-AFRICAN AMERICAN: 21 ML/MIN/1.73
GLUCOSE BLD-MCNC: 102 MG/DL (ref 74–99)
HCO3 ARTERIAL: 30.1 MMOL/L (ref 22–26)
HCT VFR BLD CALC: 41.4 % (ref 34–48)
HEMOGLOBIN: 12.7 G/DL (ref 11.5–15.5)
LYMPHOCYTES ABSOLUTE: 0.94 E9/L (ref 1.5–4)
LYMPHOCYTES RELATIVE PERCENT: 8 % (ref 20–42)
MAGNESIUM: 2.4 MG/DL (ref 1.6–2.6)
MCH RBC QN AUTO: 32 PG (ref 26–35)
MCHC RBC AUTO-ENTMCNC: 30.7 % (ref 32–34.5)
MCV RBC AUTO: 104.3 FL (ref 80–99.9)
MONOCYTES ABSOLUTE: 0.58 E9/L (ref 0.1–0.95)
MONOCYTES RELATIVE PERCENT: 5 % (ref 2–12)
MRSA CULTURE ONLY: NORMAL
NEUTROPHILS ABSOLUTE: 10.06 E9/L (ref 1.8–7.3)
NEUTROPHILS RELATIVE PERCENT: 86 % (ref 43–80)
O2 SATURATION: 88.5 % (ref 92–98.5)
OPERATOR ID: 9097
ORGANISM: ABNORMAL
OSMOLALITY URINE: 372 MOSM/KG (ref 300–900)
PCO2 ARTERIAL: 64.8 MMHG (ref 35–45)
PDW BLD-RTO: 14.6 FL (ref 11.5–15)
PH BLOOD GAS: 7.28 (ref 7.35–7.45)
PHOSPHORUS: 4.4 MG/DL (ref 2.5–4.5)
PLATELET # BLD: 85 E9/L (ref 130–450)
PLATELET CONFIRMATION: NORMAL
PMV BLD AUTO: 13.8 FL (ref 7–12)
PO2 ARTERIAL: 64.8 MMHG (ref 60–80)
POTASSIUM SERPL-SCNC: 5 MMOL/L (ref 3.5–5)
PRO-BNP: 2082 PG/ML (ref 0–125)
RBC # BLD: 3.97 E12/L (ref 3.5–5.5)
RBC # BLD: NORMAL 10*6/UL
SODIUM BLD-SCNC: 138 MMOL/L (ref 132–146)
SODIUM URINE: 22 MMOL/L
SOURCE, BLOOD GAS: ABNORMAL
TOTAL PROTEIN: 6.4 G/DL (ref 6.4–8.3)
URINE CULTURE, ROUTINE: ABNORMAL
URINE CULTURE, ROUTINE: ABNORMAL
VITAMIN B-12: 1511 PG/ML (ref 211–946)
WBC # BLD: 11.7 E9/L (ref 4.5–11.5)

## 2021-11-19 PROCEDURE — 3430000000 HC RX DIAGNOSTIC RADIOPHARMACEUTICAL: Performed by: RADIOLOGY

## 2021-11-19 PROCEDURE — A9540 TC99M MAA: HCPCS | Performed by: RADIOLOGY

## 2021-11-19 PROCEDURE — 83735 ASSAY OF MAGNESIUM: CPT

## 2021-11-19 PROCEDURE — 6370000000 HC RX 637 (ALT 250 FOR IP): Performed by: NURSE PRACTITIONER

## 2021-11-19 PROCEDURE — A9539 TC99M PENTETATE: HCPCS | Performed by: RADIOLOGY

## 2021-11-19 PROCEDURE — 78582 LUNG VENTILAT&PERFUS IMAGING: CPT

## 2021-11-19 PROCEDURE — 87449 NOS EACH ORGANISM AG IA: CPT

## 2021-11-19 PROCEDURE — 6360000002 HC RX W HCPCS: Performed by: NURSE PRACTITIONER

## 2021-11-19 PROCEDURE — 82436 ASSAY OF URINE CHLORIDE: CPT

## 2021-11-19 PROCEDURE — 36415 COLL VENOUS BLD VENIPUNCTURE: CPT

## 2021-11-19 PROCEDURE — 85025 COMPLETE CBC W/AUTO DIFF WBC: CPT

## 2021-11-19 PROCEDURE — 99232 SBSQ HOSP IP/OBS MODERATE 35: CPT | Performed by: INTERNAL MEDICINE

## 2021-11-19 PROCEDURE — 1200000000 HC SEMI PRIVATE

## 2021-11-19 PROCEDURE — 84100 ASSAY OF PHOSPHORUS: CPT

## 2021-11-19 PROCEDURE — 84300 ASSAY OF URINE SODIUM: CPT

## 2021-11-19 PROCEDURE — 2700000000 HC OXYGEN THERAPY PER DAY

## 2021-11-19 PROCEDURE — 83935 ASSAY OF URINE OSMOLALITY: CPT

## 2021-11-19 PROCEDURE — 71046 X-RAY EXAM CHEST 2 VIEWS: CPT

## 2021-11-19 PROCEDURE — 94660 CPAP INITIATION&MGMT: CPT

## 2021-11-19 PROCEDURE — 2580000003 HC RX 258: Performed by: NURSE PRACTITIONER

## 2021-11-19 PROCEDURE — 2580000003 HC RX 258: Performed by: SPECIALIST

## 2021-11-19 PROCEDURE — 80053 COMPREHEN METABOLIC PANEL: CPT

## 2021-11-19 PROCEDURE — 82607 VITAMIN B-12: CPT

## 2021-11-19 PROCEDURE — 82746 ASSAY OF FOLIC ACID SERUM: CPT

## 2021-11-19 PROCEDURE — 71250 CT THORAX DX C-: CPT

## 2021-11-19 PROCEDURE — 6360000002 HC RX W HCPCS: Performed by: SPECIALIST

## 2021-11-19 PROCEDURE — 94640 AIRWAY INHALATION TREATMENT: CPT

## 2021-11-19 PROCEDURE — 82570 ASSAY OF URINE CREATININE: CPT

## 2021-11-19 PROCEDURE — 82803 BLOOD GASES ANY COMBINATION: CPT

## 2021-11-19 PROCEDURE — 83880 ASSAY OF NATRIURETIC PEPTIDE: CPT

## 2021-11-19 PROCEDURE — APPSS30 APP SPLIT SHARED TIME 16-30 MINUTES: Performed by: NURSE PRACTITIONER

## 2021-11-19 RX ORDER — KIT FOR THE PREPARATION OF TECHNETIUM TC 99M PENTETATE 20 MG/1
55 INJECTION, POWDER, LYOPHILIZED, FOR SOLUTION INTRAVENOUS; RESPIRATORY (INHALATION)
Status: COMPLETED | OUTPATIENT
Start: 2021-11-19 | End: 2021-11-19

## 2021-11-19 RX ORDER — SODIUM CHLORIDE 9 MG/ML
INJECTION, SOLUTION INTRAVENOUS CONTINUOUS
Status: DISCONTINUED | OUTPATIENT
Start: 2021-11-19 | End: 2021-11-27 | Stop reason: HOSPADM

## 2021-11-19 RX ADMIN — MYCOPHENOLATE MOFETIL 1000 MG: 250 CAPSULE ORAL at 10:50

## 2021-11-19 RX ADMIN — VANCOMYCIN HYDROCHLORIDE 1000 MG: 1 INJECTION, POWDER, LYOPHILIZED, FOR SOLUTION INTRAVENOUS at 18:21

## 2021-11-19 RX ADMIN — CEFEPIME HYDROCHLORIDE 1000 MG: 1 INJECTION, POWDER, FOR SOLUTION INTRAMUSCULAR; INTRAVENOUS at 02:24

## 2021-11-19 RX ADMIN — Medication 10 ML: at 10:51

## 2021-11-19 RX ADMIN — KIT FOR THE PREPARATION OF TECHNETIUM TC 99M PENTETATE 55 MILLICURIE: 20 INJECTION, POWDER, LYOPHILIZED, FOR SOLUTION INTRAVENOUS; RESPIRATORY (INHALATION) at 09:20

## 2021-11-19 RX ADMIN — DESVENLAFAXINE 50 MG: 50 TABLET, EXTENDED RELEASE ORAL at 18:26

## 2021-11-19 RX ADMIN — PREDNISONE 5 MG: 5 TABLET ORAL at 10:50

## 2021-11-19 RX ADMIN — ALLOPURINOL 100 MG: 100 TABLET ORAL at 10:50

## 2021-11-19 RX ADMIN — HEPARIN SODIUM 5000 UNITS: 5000 INJECTION INTRAVENOUS; SUBCUTANEOUS at 13:57

## 2021-11-19 RX ADMIN — METOPROLOL SUCCINATE 50 MG: 50 TABLET, EXTENDED RELEASE ORAL at 10:50

## 2021-11-19 RX ADMIN — DOCUSATE SODIUM 100 MG: 100 CAPSULE ORAL at 10:50

## 2021-11-19 RX ADMIN — Medication 6 MILLICURIE: at 09:21

## 2021-11-19 RX ADMIN — IPRATROPIUM BROMIDE AND ALBUTEROL SULFATE 1 AMPULE: .5; 2.5 SOLUTION RESPIRATORY (INHALATION) at 19:12

## 2021-11-19 RX ADMIN — SODIUM CHLORIDE 25 ML: 9 INJECTION, SOLUTION INTRAVENOUS at 18:00

## 2021-11-19 RX ADMIN — DOCUSATE SODIUM 100 MG: 100 CAPSULE ORAL at 22:13

## 2021-11-19 RX ADMIN — CEFEPIME HYDROCHLORIDE 1000 MG: 1 INJECTION, POWDER, FOR SOLUTION INTRAMUSCULAR; INTRAVENOUS at 13:52

## 2021-11-19 RX ADMIN — ATORVASTATIN CALCIUM 10 MG: 10 TABLET, FILM COATED ORAL at 10:50

## 2021-11-19 RX ADMIN — MYCOPHENOLATE MOFETIL 1000 MG: 250 CAPSULE ORAL at 22:13

## 2021-11-19 RX ADMIN — Medication 10 ML: at 22:37

## 2021-11-19 RX ADMIN — PREGABALIN 75 MG: 75 CAPSULE ORAL at 00:08

## 2021-11-19 RX ADMIN — IPRATROPIUM BROMIDE AND ALBUTEROL SULFATE 1 AMPULE: .5; 2.5 SOLUTION RESPIRATORY (INHALATION) at 06:06

## 2021-11-19 RX ADMIN — FOLIC ACID 1 MG: 1 TABLET ORAL at 10:50

## 2021-11-19 RX ADMIN — CLOZAPINE 50 MG: 25 TABLET ORAL at 22:13

## 2021-11-19 RX ADMIN — IPRATROPIUM BROMIDE AND ALBUTEROL SULFATE 1 AMPULE: .5; 2.5 SOLUTION RESPIRATORY (INHALATION) at 14:40

## 2021-11-19 RX ADMIN — SODIUM CHLORIDE 25 ML: 9 INJECTION, SOLUTION INTRAVENOUS at 06:30

## 2021-11-19 RX ADMIN — LEVOTHYROXINE SODIUM 50 MCG: 0.05 TABLET ORAL at 06:18

## 2021-11-19 RX ADMIN — Medication 10 ML: at 06:36

## 2021-11-19 RX ADMIN — CYCLOSPORINE 100 MG: 25 CAPSULE, LIQUID FILLED ORAL at 10:50

## 2021-11-19 RX ADMIN — PREGABALIN 75 MG: 75 CAPSULE ORAL at 10:50

## 2021-11-19 RX ADMIN — PREGABALIN 75 MG: 75 CAPSULE ORAL at 22:13

## 2021-11-19 RX ADMIN — CYCLOSPORINE 100 MG: 25 CAPSULE, LIQUID FILLED ORAL at 22:13

## 2021-11-19 RX ADMIN — HEPARIN SODIUM 5000 UNITS: 5000 INJECTION INTRAVENOUS; SUBCUTANEOUS at 22:12

## 2021-11-19 ASSESSMENT — PAIN SCALES - GENERAL: PAINLEVEL_OUTOF10: 0

## 2021-11-19 NOTE — PROGRESS NOTES
Date:2021  Patient Name: Camilo Connors  MRN: [id number]  : 1955  ROOM #: [id number]    Occupational Therapy order received, chart reviewed and evaluation attempted this date. Patient is unavailable for OT evaluation due to being off the unit: nuclear. Spoke to [place] in Social Work regarding patient being out of the room, LSW stated pt now has positive blood cultures and evaluation can be completed on . Will attempt OT evaluation at a later time. Thank you.    Geronimo Levi OTR/L #[id number]

## 2021-11-19 NOTE — CARE COORDINATION
Ss note:11/19/2021.10:22 AM Neg covid on 11-. Per IDR pt is lethargic today, has positive blood cultures, ID in on, pt is on 3 liters of oxygen. Referral was made to Josie at [facility] DR FAUSTO PEREZ, she is reviewing, will need to know ABX need and updated OT note and then follow up with Josie to determine if [place] for acceptance. NO PRECERT if accepted. Additional SNF choice is [facility]. SW will follow.  AG [place]

## 2021-11-19 NOTE — PROGRESS NOTES
[facility]     [address] [place] [address] 80  [place], [address] [place] [address] Street  Phone [phone]   [phone]      Admit Date: 2021  4:50 AM  Pt Name: Paul Pearl  MRN: [id number]  : 1955  Reason for Consult:    Chief Complaint   Patient presents with    Urinary Tract Infection     Requesting Physician:  Anastasia Campa MD  PCP: Geraldine Jimenez MD  History Obtained From:  patient, chart   ID consulted for Sepsis due to urinary tract infection ([facility] [A41.9, N39.0]  Urinary tract infection without hematuria, site unspecified [N39.0]  Sepsis, due to unspecified organism, unspecified whether acute organ dysfunction present ([address] [A41.9]  on hospital day 2   Face to face encounter   CHIEF COMPLAINT       Chief Complaint   Patient presents with    Urinary Tract Infection     HISTORYOF PRESENT ILLNESS   Paul Pearl is a 73 y.o. female who presents with   has a past medical history of Abnormal EKG, Acute gout involving toe of right foot, Acute gout involving toe of right foot, Acute on chronic combined systolic (congestive) and diastolic (congestive) heart failure ([address] Cancer ([address] Cerebrovascular disease, Clotting disorder ([address] Depression, Hyperlipidemia, Hypertension, Hyperthyroidism, Leg swelling, Lymphedema of both lower extremities, Peripheral vascular disease ([address] Renal failure, Thrombophlebitis, and Thyroid disease. Urinary Tract Infection        Patient admitted to hospital from home. She was weak, reports dysuria, and generalized weakness. She has been having fever and chills at home. She was hypoxic at home- 88 %. She has a cough. She was admitted for further evaluation. She is known to ID service- she was last seen in 2019-for a klebsiella UTI- was discharge with cefepime due to tachycardia and prolonged QT with cipro.      She is currently on IV cefepime  Her temp has been as high as 101.7   She has a cough - and is distension. Nontender. Extremities: Moves all extremities x 4. Warm and well perfused,++ edema   Skin: Warm and dry    Neurologic:    No focal deficits  Psych: Normal Affect  PIV     DIAGNOSTIC RESULTS   RADIOLOGY:   XR CHEST (2 VW)    Result Date: 11/17/2021  EXAMINATION: TWO XRAY VIEWS OF THE CHEST 11/17/2021 9:31 am COMPARISON: None. HISTORY: ORDERING SYSTEM PROVIDED HISTORY: elevated BNP, wheezing TECHNOLOGIST PROVIDED HISTORY: Reason for exam:->elevated BNP, wheezing FINDINGS: There is prominence of the pulmonary vasculature. There is no effusion or pneumothorax. The cardiomediastinal silhouette is stable in size. The osseous structures are without acute process. Mild pulmonary vascular congestion. XR CHEST 1 VIEW    Result Date: 11/17/2021  EXAMINATION: ONE XRAY VIEW OF THE CHEST 11/17/2021 5:17 am COMPARISON: 2nd June 2020 HISTORY: ORDERING SYSTEM PROVIDED HISTORY: sepsis TECHNOLOGIST PROVIDED HISTORY: Reason for exam:->sepsis FINDINGS: Heart is mildly enlarged. Pulmonary vascularity is normal.  Lungs are clear. Neither costophrenic angle is blunted. Mild cardiomegaly. LABS  Recent Labs     11/17/21  0556 11/18/21  0543 11/19/21  0703   WBC 13.5* 12.9* 11.7*   HGB 13.8 13.8 12.7   HCT 46.7 45.7 41.4   .1* 105.5* 104.3*   PLT 71* 78* 85*     Recent Labs     11/17/21  1117 11/17/21  1117 11/18/21  0543 11/18/21  0543 11/19/21  0703     --  138  --  138   K 4.5   < > 4.8   < > 5.0      < > 101   < > 101   CO2 24   < > 23   < > 25   BUN 18  --  30*  --  44*   CREATININE 1.7*  --  2.1*  --  2.3*   GFRAA 36  --  29  --  26   LABGLOM 30  --  24  --  21   GLUCOSE 87  --  89  --  102*   PROT 6.1*  --  6.1*  --  6.4   LABALBU 3.4*  --  3.2*  --  3.2*   CALCIUM 9.6  --  9.6  --  9.7   BILITOT 0.6  --  0.8  --  0.5   ALKPHOS 62  --  59  --  60   AST 20  --  29  --  44*   ALT 7  --  8  --  14    < > = values in this interval not displayed.      Lab Results   Component Value Date COVID19 Not Detected 11/17/2021     COVID-19/MARINA-COV2 LABS  Recent Labs     11/17/21  0556 11/17/21  0802 11/17/21  1117 11/17/21  1416 11/18/21  0543 11/19/21  0703   CRP  --   --   --  34.6*  --   --    PROCAL  --   --   --  2.12*  --   --    FERRITIN  --   --   --   --  555  --    LDH  --   --   --  299*  --   --    DDIMER  --   --   --  836  --   --    INR 1.1  --   --   --   --   --    PROTIME 12.9*  --   --   --   --   --    AST  --    < > 20  --  29 44*   ALT  --    < > 7  --  8 14    < > = values in this interval not displayed. MICROBIOLOGY:     Cultures :   Lab Results   Component Value Date    BC 24 Hours no growth 11/17/2021    BC  12/17/2019     Refer to previous culture of CXBLD from 12-17-19 @1120 for  susceptibility results      ORG Gram negative hank 11/17/2021    ORG Klebsiella pneumoniae ssp pneumoniae 11/17/2021    ORG Escherichia coli 01/20/2021     Lab Results   Component Value Date    BLOODCULT2  11/17/2021     Gram stain performed from blood culture bottle media  Gram positive cocci in clusters  Gram negative rods      BLOODCULT2 Identification and sensitivity to follow 11/17/2021    ORG Gram negative hank 11/17/2021    ORG Klebsiella pneumoniae ssp pneumoniae 11/17/2021    ORG Escherichia coli 01/20/2021     Urine Culture, Routine   Date Value Ref Range Status   11/17/2021 <10,000 CFU/mL  Mixed gram positive organisms   (A)  Final   11/17/2021 >100,000 CFU/ml  Final   04/09/2021   Final    10 to 100,000 CFU/mL  Mixed ruboi isolated. Further workup and sensitivity testing  is not routinely indicated and will not be performed.   Mixed rubio isolated includes:  Escherichia coli  Mixed gram positive organisms  Alpha hemolytic Strep species  Yeast  Nonhemolytic Strep species       FINAL IMPRESSION    Patient is a 73 y.o. female who presented with   Chief Complaint   Patient presents with    Urinary Tract Infection    and admitted for Sepsis due to urinary tract infection ([facility] [address] [A41.9, N39.0]  Urinary tract infection without hematuria, site unspecified [N39.0]  Sepsis, due to unspecified organism, unspecified whether acute organ dysfunction present ([facility] [A41.9]  · Fevers   · Leukocytosis   · UTI/ dysuria  MIXED GPO/KLEBSIELLA  · Gn septicemia/prob pyelonephritis   · History of kidney transplant   · Rule out viral infection   resp viral panel neg   · Immunocompromised patient     Plan:   · Continue Cefepime  · VANCO X1  · Check MRSA screen  · Monitor labs  · Check FINAL cultures  · Respiratory panel- pending  · Monitor labs      Imaging and labs were reviewed. Thank you for involving me in the care of Abelardo Amos. Please do not hesitate to call for any questions or concerns.     Electronically signed by Andreea Ring MD on 11/19/2021 at 3:34 PM    Phone [phone]  Fax [phone]

## 2021-11-19 NOTE — PROGRESS NOTES
INPATIENT CARDIOLOGY FOLLOW-UP    Name: Abelardo Amos    Age: 73 y.o. Date of Admission: 11/17/2021  4:50 AM    Date of Service: 11/19/2021    Primary Cardiologist: Dr Cecelia Chowdhury    Chief Complaint: Follow-up for troponin    Interim History:  Very tired today. Denies chest pain or shortness of breath. Had nuclear VQ scan showed low probability of PE.     Review of Systems:   Negative except as described above    Problem List:  Patient Active Problem List   Diagnosis    Peripheral vascular disease ([facility]    Depression    Clotting disorder ([facility]    Abnormal EKG    Cancer ([facility]    Over weight    S/p cadaver renal transplant    ESRD (end stage renal disease) ([facility]    Non morbid obesity due to excess calories    Hypertension    Leg swelling    Lymphedema of both lower extremities    Acute gout involving toe of right foot    Sepsis secondary to UTI ([facility]    Acute kidney injury superimposed on CKD ([facility]    Thyroid disease    Acute on chronic combined systolic (congestive) and diastolic (congestive) heart failure (HCC)       Current Medications:    Current Facility-Administered Medications:     cefepime (MAXIPIME) 1,000 mg in dextrose 5 % 50 mL extended infusion IVPB, 1,000 mg, IntraVENous, Q12H, Stopped at 11/19/21 0630 **AND** 0.9 % sodium chloride infusion, 25 mL, IntraVENous, Q12H, Tamiko Graham, APRN - CNP, Last Rate: 12.5 mL/hr at 11/19/21 0630, 25 mL at 11/19/21 0630    allopurinol (ZYLOPRIM) tablet 100 mg, 100 mg, Oral, Daily, Jolan Gladys, APRN - CNP, 100 mg at 11/19/21 1050    cloZAPine (CLOZARIL) tablet 50 mg, 50 mg, Oral, Nightly, Jolan Gladys, APRN - CNP, 50 mg at 11/18/21 2303    cycloSPORINE modified (NEORAL) capsule 100 mg, 100 mg, Oral, BID, Jolan Gladys, APRN - CNP, 100 mg at 11/19/21 1050    desvenlafaxine succinate (PRISTIQ) extended release tablet 50 mg, 50 mg, Oral, QPM, Jolan Gladys, APRN - CNP, 50 mg at 11/18/21 1924    docusate sodium (COLACE) capsule 100 mg, 100 mg, Oral, BID, KATINA Rivera - CNP, 100 mg at 23/50/28 6847    folic acid (FOLVITE) tablet 1 mg, 1 mg, Oral, Daily, KATINA Rivera - CNP, 1 mg at 11/19/21 1050    levothyroxine (SYNTHROID) tablet 50 mcg, 50 mcg, Oral, Daily, KATINA Rivera - CNP, 50 mcg at 11/19/21 0618    [Held by provider] lisinopril (PRINIVIL;ZESTRIL) tablet 5 mg, 5 mg, Oral, Daily, KATINA Rivera - CNP, 5 mg at 11/17/21 1430    metoprolol succinate (TOPROL XL) extended release tablet 50 mg, 50 mg, Oral, Daily, KATINA Rivera - CNP, 50 mg at 11/19/21 1050    predniSONE (DELTASONE) tablet 5 mg, 5 mg, Oral, Daily, KATINA Rivera - CNP, 5 mg at 11/19/21 1050    pregabalin (LYRICA) capsule 75 mg, 75 mg, Oral, BID, KATINA Rivera - CNP, 75 mg at 11/19/21 1050    atorvastatin (LIPITOR) tablet 10 mg, 10 mg, Oral, Daily, KATINA Rivera - CNP, 10 mg at 11/19/21 1050    sodium chloride flush 0.9 % injection 5-40 mL, 5-40 mL, IntraVENous, 2 times per day, KATINA Rivera - CNP, 10 mL at 11/19/21 1051    sodium chloride flush 0.9 % injection 5-40 mL, 5-40 mL, IntraVENous, PRN, KATINA Rivera - CNP    0.9 % sodium chloride infusion, 25 mL, IntraVENous, PRN, KATINA Rivera - CNP    ondansetron (ZOFRAN-ODT) disintegrating tablet 4 mg, 4 mg, Oral, Q8H PRN **OR** ondansetron (ZOFRAN) injection 4 mg, 4 mg, IntraVENous, Q6H PRN, KATINA Rivera - CNP    acetaminophen (TYLENOL) tablet 650 mg, 650 mg, Oral, Q6H PRN, 650 mg at 11/17/21 2005 **OR** acetaminophen (TYLENOL) suppository 650 mg, 650 mg, Rectal, Q6H PRN, KATINA Rivera CNP    polyethylene glycol (GLYCOLAX) packet 17 g, 17 g, Oral, Daily PRN, KATINA Rivera CNP    heparin (porcine) injection 5,000 Units, 5,000 Units, SubCUTAneous, 3 times per day, KATINA Rivera CNP, 5,000 Units at 11/18/21 1825    perflutren lipid microspheres (DEFINITY) injection 1.65 mg, 1.5 mL, IntraVENous, ONCE PRN, Odalys Rhymes, APRN - CNP    mycophenolate (CELLCEPT) capsule 1,000 mg, 1,000 mg, Oral, BID, Odalys Rhymes, APRN - CNP, 1,000 mg at 11/19/21 1050    ipratropium-albuterol (DUONEB) nebulizer solution 1 ampule, 1 ampule, Inhalation, Q4H WA, Odalys Rhymes, APRN - CNP, 1 ampule at 11/19/21 0606    Physical Exam:  /79   Pulse 89   Temp 99 °F (37.2 °C) (Axillary)   Resp 18   Ht 5' 2\" (1.575 m)   Wt 227 lb 3.2 oz (103.1 kg) Comment: bed scale  SpO2 92%   BMI 41.56 kg/m²   Wt Readings from Last 3 Encounters:   11/17/21 227 lb 3.2 oz (103.1 kg)   08/16/21 229 lb (103.9 kg)   10/01/20 245 lb 8 oz (111.4 kg)     Appearance: Obese, very lethargic, nasal cannula  Skin: Intact, no rash  Head: Normocephalic, atraumatic  Eyes: EOMI, no conjunctival erythema  ENMT: No pharyngeal erythema, MMM, no rhinorrhea  Neck: Supple, no elevated JVP, no carotid bruits  Lungs: Scattered expiratory wheeze  Cardiac: PMI nondisplaced, Regular rhythm with a normal rate, S1 & S2 normal, no murmurs  Abdomen: Large abdomen, soft, nontender, +bowel sounds  Extremities: Moves all extremities x 4, no lower extremity edema  Neurologic: No focal motor deficits apparent, normal mood and affect  Peripheral Pulses: Intact posterior tibial pulses bilaterally    Intake/Output:    Intake/Output Summary (Last 24 hours) at 11/19/2021 1217  Last data filed at 11/19/2021 0842  Gross per 24 hour   Intake 440 ml   Output 550 ml   Net -110 ml     I/O this shift:   In: 0   Out: 550 [Urine:550]    Laboratory Tests:  Recent Labs     11/17/21  1117 11/18/21  0543 11/19/21  0703    138 138   K 4.5 4.8 5.0    101 101   CO2 24 23 25   BUN 18 30* 44*   CREATININE 1.7* 2.1* 2.3*   GLUCOSE 87 89 102*   CALCIUM 9.6 9.6 9.7     Lab Results   Component Value Date    MG 2.4 11/19/2021     Recent Labs     11/17/21  1117 11/18/21  0543 11/19/21  0703   ALKPHOS 62 59 60   ALT 7 8 14   AST 20 29 44*   PROT 6.1* 6.1* 6.4 BILITOT 0.6 0.8 0.5   LABALBU 3.4* 3.2* 3.2*     Recent Labs     21  0556 21  0543 21  0703   WBC 13.5* 12.9* 11.7*   RBC 4.36 4.33 3.97   HGB 13.8 13.8 12.7   HCT 46.7 45.7 41.4   .1* 105.5* 104.3*   MCH 31.7 31.9 32.0   MCHC 29.6* 30.2* 30.7*   RDW 14.5 14.6 14.6   PLT 71* 78* 85*   MPV 13.8* 13.8* 13.8*     No results found for: CKTOTAL, CKMB, CKMBINDEX, TROPONINI  Lab Results   Component Value Date    INR 1.1 2021    PROTIME 12.9 (H) 2021     Lab Results   Component Value Date    TSH 0.376 2021     Lab Results   Component Value Date    LABA1C 5.7 (H) 2021     No results found for: EAG  No results found for: CHOL  No results found for: TRIG  No results found for: HDL  No results found for: LDLCALC, LDLCHOLESTEROL  No results found for: LABVLDL, VLDL  No results found for: CHOLHDLRATIO  Recent Labs     21  0556   PROBNP 2,666*       Cardiac Tests:    EK2021: Sinus rhythm 79 beats minute. Left bundle branch block with left axis. QRS duration 146 ms    Telemetry: Sinus rhythm 90s; episode of nonsustained irregular wide-complex tachycardia, possibly nonsustained A. fib    Chest X-ray:   21    Impression   New left CP angle opacity suspicious for pleural effusion.  Differential   includes summation artifact.  No other pulmonary consolidation       Persistent cardiomegaly with slight vascular congestion versus summation   artifact from large body habitus       Echocardiogram:   TTE 2019     Technically sub-optimal images. Normal left ventricular chamber size. Mild to moderate global hypokinesis, LVEF estimated about 35-38%. Mild left ventricular concentric hypertrophy noted. There is doppler evidence of stage I diastolic dysfunction. Normal left atrial pressure. Left atrium is of normal size. Interatrial septum not well visualized but appears intact. Normal right ventricle structure and function. No mitral valve prolapse. Normal aortic root size. No evidence of pericardial effusion. Pericardium appears normal.   The inferior vena cava diameter is normal with decreased respiratory   variation. No intracardiac mass. Compared to prior study from 2017 - which showed EF 35-38% and mild MR. Stress test:    Pharm stress test 6/6/2015 fixed septal as well as apical defectwith no reversible ischemia, inferior wall mild hypokinesis, abnormal septal motion, EF 50%    Cardiac catheterization:   2006- normal coronaries      ----------------------------------------------------------------------------------------------------------------------------------------------------------------  IMPRESSION:  1. Elevated troponin: Likely acute myocardial injury in the context of sepsis/bacteremia/acute renal failure. No evidence of ACS, no chest pain  2. Reported nonischemic cardiomyopathy. EF 35 to 40% 2019  3. Bacteremia. GPC clusters, GNR  4. CECILY/CKD, history of renal transplant. Creatinine 1.7 -> 2.3  5. UTI/sepsis  6. Chronic left bundle branch block  7. PAD status post femoral bypass  8. Hypertension  9. Hyperlipidemia  10. Thrombocytopenia platelets 85  11. Morbid obesity BMI 41.6 kg/m²      RECOMMENDATIONS:  No acute cardiac issues at this time.  Transthoracic echo pending to reassess EF   Continue beta-blocker; lisinopril on hold   Recommend outpatient ischemic evaluation   Further recommendations will follow as needed based on echocardiogram   Further care per primary, nephrology, infectious disease   Otherwise will be available as needed, please call with questions   Outpatient follow-up with Dr. Nolvia Flores MD, [facility]    NOTE: This report was transcribed using voice recognition software. Every effort was made to ensure accuracy; however, inadvertent computerized transcription errors may be present.

## 2021-11-19 NOTE — PROGRESS NOTES
Attempted to meet with pt regarding new consult for HF RN. Patient currently off the floor for V/Q scan. Will attempt to meet with pt again.    Kj Cody RN

## 2021-11-19 NOTE — PROGRESS NOTES
[facility] MD Lucila Allen MD Maxine Meyer, MD. Nena Michaels MD   Progress Note    11/19/2021    SUBJECTIVE:   Seen in her room , sleeping o2/nc , no LE edema , skin wrinkling in LE . No JVD , poor po intake . PROBLEM LIST:    Principal Problem:    Sepsis secondary to UTI [facility])  Active Problems:    Depression    Hypertension    Acute kidney injury superimposed on CKD (HCC)    Thyroid disease    Acute on chronic combined systolic (congestive) and diastolic (congestive) heart failure (HCC)  Resolved Problems:    * No resolved hospital problems. *       DIET:    ADULT DIET; Regular;  No Added Salt (3-4 gm)       Allergies : Macrodantin [nitrofurantoin macrocrystal] and Sulfa antibiotics    Past Medical History:   Diagnosis Date    Abnormal EKG     left bundle branch block    Acute gout involving toe of right foot 9/3/2020    Acute gout involving toe of right foot 9/3/2020    Acute on chronic combined systolic (congestive) and diastolic (congestive) heart failure (HCC)     Cancer (HCC)     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    Cerebrovascular disease     Clotting disorder ([address] 1985    thrombophlebitis after c section delivery    Depression     15 years followed by dr Naa Hewitt    Hyperlipidemia     Hypertension     Hyperthyroidism     Leg swelling 9/3/2020    Lymphedema of both lower extremities 9/3/2020    Peripheral vascular disease ([address]     Renal failure 11/2012    renal transplant    Thrombophlebitis     after c section delivery    Thyroid disease        Past Surgical History:   Procedure Laterality Date   [address]    one normal delivery    COLONOSCOPY      DIAGNOSTIC CARDIAC CATH LAB PROCEDURE  2006    normal coronaries and 1996 normal coronaries    DIALYSIS FISTULA CREATION  march and july 2012    phase one and two patient will start dialysis when needed   [address]    transfemoral venous bypass grafts  KIDNEY TRANSPLANT  2016    KIDNEY TRANSPLANT  2015    KIDNEY TRANSPLANT  2015    PARATHYROIDECTOMY  2006    left parathyroid  implant and parathyroidectomy       Family History   Problem Relation Age of Onset    Diabetes Mother     Diabetes Father     Dementia Father         reports that she quit smoking about 6 years ago. She has a 20.00 pack-year smoking history. She has never used smokeless tobacco. She reports that she does not drink alcohol and does not use drugs. Review of Systems:   Constitutional: no fevers , no chills , feels ok   Eyes: no eye pain , no itching , no drainage  Ears, nose, mouth, throat, and face: no ear ,nose pain , hearing is ok ,no nasal drainage   Respiratory: no sob ,no cough ,no wheezing . Cardiovascular: no chest pain , no palpitation ,no sob . Gastrointestinal: no nausea, vomiting , constipation , no abdominal pain . Genitourinary:no urinary retention , no burning , dysuria . No polyuria   Hematologic/lymphatic: no bleeding , no cougulation issues . Musculoskeletal:no joint pain , no swelling . Neurological: no headaches ,no weakness , no numbness . Endocrine: no thirst , no weight issues .      MEDS (scheduled):    vancomycin  1,000 mg IntraVENous Once    cefepime (MAXIPIME) IVPB extended  1,000 mg IntraVENous Q12H    allopurinol  100 mg Oral Daily    cloZAPine  50 mg Oral Nightly    cycloSPORINE modified  100 mg Oral BID    desvenlafaxine succinate  50 mg Oral QPM    docusate sodium  100 mg Oral BID    folic acid  1 mg Oral Daily    levothyroxine  50 mcg Oral Daily    [Held by provider] lisinopril  5 mg Oral Daily    metoprolol succinate  50 mg Oral Daily    predniSONE  5 mg Oral Daily    pregabalin  75 mg Oral BID    atorvastatin  10 mg Oral Daily    sodium chloride flush  5-40 mL IntraVENous 2 times per day    heparin (porcine)  5,000 Units SubCUTAneous 3 times per day    mycophenolate  1,000 mg Oral BID    ipratropium-albuterol  1 ampule Inhalation Q4H WA       MEDS (infusions):   sodium chloride      sodium chloride 25 mL (11/19/21 0630)    sodium chloride         MEDS (prn):  sodium chloride flush, sodium chloride, ondansetron **OR** ondansetron, acetaminophen **OR** acetaminophen, polyethylene glycol, perflutren lipid microspheres    PHYSICAL EXAM:     Patient Vitals for the past 24 hrs:   BP Temp Temp src Pulse Resp SpO2   11/19/21 0819 133/79 99 °F (37.2 °C) Axillary 89 18 92 %   11/18/21 2258 122/66 98.3 °F (36.8 °C) Oral 95 20 93 %   11/18/21 1600 -- 97.6 °F (36.4 °C) Oral -- -- 93 %   @      Intake/Output Summary (Last 24 hours) at 11/19/2021 1549  Last data filed at 11/19/2021 1448  Gross per 24 hour   Intake 540 ml   Output 650 ml   Net -110 ml         Wt Readings from Last 3 Encounters:   11/17/21 227 lb 3.2 oz (103.1 kg)   08/16/21 229 lb (103.9 kg)   10/01/20 245 lb 8 oz (111.4 kg)       Constitutional:  in no acute distress  Oral: mucus membranes moist  Neck: no JVD  Cardiovascular: S1, S2 regular rhythm, no murmur,or rub  Respiratory:  No crackles, no wheeze  Gastrointestinal:  Soft, nontender, nondistended, NABS  Ext: no edema, feet warm  Skin: dry, no rash  Neuro: awake, alert, interactive      DATA:    Recent Labs     11/17/21  0556 11/18/21  0543 11/19/21  0703   WBC 13.5* 12.9* 11.7*   HGB 13.8 13.8 12.7   HCT 46.7 45.7 41.4   .1* 105.5* 104.3*   PLT 71* 78* 85*     Recent Labs     11/17/21  1117 11/18/21  0543 11/19/21  0703    138 138   K 4.5 4.8 5.0    101 101   CO2 24 23 25   MG  --  1.6 2.4   PHOS  --  4.9* 4.4   BUN 18 30* 44*   CREATININE 1.7* 2.1* 2.3*   ALT 7 8 14   AST 20 29 44*   BILITOT 0.6 0.8 0.5   ALKPHOS 62 59 60       No results found for: LABPROT    ASSESSMENT / RECOMMENDATIONS:      1. Acute kidney injury felt to be related to volume depletion in  context of urosepsis. 2.  Chronic kidney disease, stage 2 in the patient with kidney  transplant.   Baseline _____ function with a creatinine

## 2021-11-19 NOTE — CONSULTS
[address]                                  CONSULTATION    PATIENT NAME: Marianne Leyva              :        1955  MED REC NO:   [id number]                            ROOM:       0422  ACCOUNT NO:   [de-identified]                           ADMIT DATE: 2021  PROVIDER:     Debi Avery MD    CONSULT DATE:  2021    REASON FOR CONSULTATION:  Acute kidney injury. HISTORY OF PRESENT ILLNESS:  The patient is a pleasant 56-year-old  female, presenting to the hospital with chief complaint of weakness,  dysuria, and urinary frequency. She has a history of hypotension; history of kidney failure, status post  transplant. She has a followup with Dr. Dk Andujar as an outpatient. As mentioned above, she presented with symptoms consistent with  pyelonephritis/UTI, and she is being admitted with diagnosis of  urosepsis. I saw her in her room this afternoon. She is alert. She is oriented. She appeared clinically euvolemic. Her vitals are stable. Blood  pressure is 117/56. She is on O2 via nasal cannula. She has no edema. She is cooperative. Her creatinine is 2.1 today. Baseline creatinine therefore is around  1.1 to 1.3. She had an ultrasound of the transplant kidney, showing no acute  findings. In terms of transplant medications; she is currently on CellCept 1 gm  b.i.d., prednisone 5 mg, and cyclosporine 100 mg b.i.d. REVIEW OF SYSTEMS:  Twelve-point done, negative except for those  mentioned above. ALLERGIES:  Include SULFA MEDICATION, NITROFURANTOIN. PAST MEDICAL HISTORY:  Includes gout, hyperlipidemia, hypotension,  hypothyroidism, kidney failure, and thyroid disease. PAST SURGICAL HISTORY:  Includes , colonoscopy, and kidney  transplant. SOCIAL HISTORY:  She quit smoking 6 years ago. FAMILY HISTORY:  Reviewed and noncontributory.     PHYSICAL EXAMINATION:  VITAL SIGNS:  Blood pressure is 117/56, heart rate 91. HEAD:  Atraumatic, normocephalic. EYES:  Pupils round and reactive to light bilaterally. NECK:  Supple. Symmetrical.  HEART:  Normal rate. ABDOMEN:  Soft, nontender. Bowel sounds active. No organomegaly. LUNGS:  Good air flow bilaterally. SKIN:  Dry. PSYCHIATRIC:  Cooperative. NEUROLOGIC:  Cranial nerves grossly intact from II through XII. BLOOD WORK:  Sodium 138, potassium 4.8, CO2 of 23, creatinine 2.1. Hemoglobin is _____. ASSESSMENT AND PLAN AND LIST OF PROBLEMS:  1. Acute kidney injury felt to be related to volume depletion in  context of urosepsis. 2.  Chronic kidney disease, stage 2 in the patient with kidney  transplant. Baseline _____ function with a creatinine of 1.1 to 1.3.  3. Immunosuppressant status, chronically on CellCept 1 gm b.i.d.,  cyclosporine 100 mg b.i.d., and prednisone. 4.  Urosepsis management per ID service. PLAN:  1. We will start gentle IV fluid in form of normal saline at 75 mL an  hour. 2.  Check urine electrolytes, urine sodium, chloride, creatinine. 3.  Continue current immunosuppressant. 4.  Antibiotic management per ID service. Apparently, she has positive  Blood  culture. Thank you for involving us and taking care of the patient.         Samantha Veliz MD    D: 11/18/2021 18:01:34       T: 11/18/2021 21:33:32     [id number]  Job#: [id number]     Doc#: [id number]    CC:

## 2021-11-19 NOTE — PROGRESS NOTES
[address] [place], [place] [address]   Progress Note    Admitting Date and Time: 11/17/2021  4:50 AM  Admit Dx: Sepsis due to urinary tract infection ([facility] [address] [A41.9, N39.0]  Urinary tract infection without hematuria, site unspecified [N39.0]  Sepsis, due to unspecified organism, unspecified whether acute organ dysfunction present ([facility] [address] [A41.9]    Subjective:    Patient reports feeling tired today. She is still requiring 2 liters of oxygen. Her VQ scan showed low probability for PE. Chest xray suggestive of vascular congestion. ROS: denies  n/v, HA unless stated above.      cefepime (MAXIPIME) IVPB extended  1,000 mg IntraVENous Q12H    allopurinol  100 mg Oral Daily    cloZAPine  50 mg Oral Nightly    cycloSPORINE modified  100 mg Oral BID    desvenlafaxine succinate  50 mg Oral QPM    docusate sodium  100 mg Oral BID    folic acid  1 mg Oral Daily    levothyroxine  50 mcg Oral Daily    [Held by provider] lisinopril  5 mg Oral Daily    metoprolol succinate  50 mg Oral Daily    predniSONE  5 mg Oral Daily    pregabalin  75 mg Oral BID    atorvastatin  10 mg Oral Daily    sodium chloride flush  5-40 mL IntraVENous 2 times per day    heparin (porcine)  5,000 Units SubCUTAneous 3 times per day    mycophenolate  1,000 mg Oral BID    ipratropium-albuterol  1 ampule Inhalation Q4H WA     sodium chloride flush, 5-40 mL, PRN  sodium chloride, 25 mL, PRN  ondansetron, 4 mg, Q8H PRN   Or  ondansetron, 4 mg, Q6H PRN  acetaminophen, 650 mg, Q6H PRN   Or  acetaminophen, 650 mg, Q6H PRN  polyethylene glycol, 17 g, Daily PRN  perflutren lipid microspheres, 1.5 mL, ONCE PRN         Objective:    /79   Pulse 89   Temp 99 °F (37.2 °C) (Axillary)   Resp 18   Ht 5' 2\" (1.575 m)   Wt 227 lb 3.2 oz (103.1 kg) Comment: bed scale  SpO2 92%   BMI 41.56 kg/m²     General Appearance: falls asleep easily, oriented to person and in no acute distress  Skin: warm and dry  Head: normocephalic and atraumatic  Eyes: pupils equal, round, and reactive to light, conjunctivae normal  Neck: neck supple and non tender without mass   Pulmonary/Chest:  wheezes, crackles, no respiratory distress  Cardiovascular: regular rate, normal S1 and S2   Abdomen: soft, non-tender, non-distended, normal bowel sounds  Extremities: no cyanosis, no clubbing and 1+ bilateral lower extremity edema  Neurologic: no cranial nerve deficit and speech normal    Recent Labs     11/17/21  1117 11/18/21  0543 11/19/21  0703    138 138   K 4.5 4.8 5.0    101 101   CO2 24 23 25   BUN 18 30* 44*   CREATININE 1.7* 2.1* 2.3*   GLUCOSE 87 89 102*   CALCIUM 9.6 9.6 9.7       Recent Labs     11/17/21  1117 11/18/21  0543 11/19/21  0703   ALKPHOS 62 59 60   PROT 6.1* 6.1* 6.4   LABALBU 3.4* 3.2* 3.2*   BILITOT 0.6 0.8 0.5   AST 20 29 44*   ALT 7 8 14       Recent Labs     11/17/21  0556 11/18/21  0543 11/19/21  0703   WBC 13.5* 12.9* 11.7*   RBC 4.36 4.33 3.97   HGB 13.8 13.8 12.7   HCT 46.7 45.7 41.4   .1* 105.5* 104.3*   MCH 31.7 31.9 32.0   MCHC 29.6* 30.2* 30.7*   RDW 14.5 14.6 14.6   PLT 71* 78* 85*   MPV 13.8* 13.8* 13.8*           Radiology:   XR CHEST (2 VW)   Final Result   New left CP angle opacity suspicious for pleural effusion. Differential   includes summation artifact. No other pulmonary consolidation      Persistent cardiomegaly with slight vascular congestion versus summation   artifact from large body habitus         NM LUNG VENT/PERFUSION (VQ)   Final Result   Low probability for pulmonary embolus. US DUP LOWER EXTREMITIES BILATERAL VENOUS   Final Result   No evidence of DVT in either lower extremity. CT ABDOMEN PELVIS WO CONTRAST Additional Contrast? None   Final Result   Limited evaluation for pyelonephritis given lack of IV contrast.  Allowing   for this limitation, the findings are as follows:       The transplant kidney demonstrates perinephric stranding, which can be seen   in the setting of infection/inflammation. No hydronephrosis. Atrophic native kidneys with multiple cysts. Recommend nonemergent renal   ultrasound for further evaluation of an intermediate density structure in the   right kidney. Mildly dilated loops of proximal small bowel are noted, with decompressed   distal small bowel but no discrete transition point. This is favored to   reflect ileus, however obstruction cannot be excluded. Recommend monitoring   of bowel function and follow-up KUBs. Cardiomegaly. US RETROPERITONEAL COMPLETE   Final Result   TRANSPLANT KIDNEY: No evidence of hydronephrosis. Unremarkable echogenicity. NATIVE KIDNEYS: Findings of chronic medical renal disease. No evidence of   hydronephrosis. Small left renal cysts. XR CHEST (2 VW)   Final Result   Mild pulmonary vascular congestion. XR CHEST 1 VIEW   Final Result   Mild cardiomegaly. Assessment:  Principal Problem:    Sepsis secondary to UTI [facility])  Active Problems:    Depression    Hypertension    Acute kidney injury superimposed on CKD ([facility] [address]    Thyroid disease    Acute on chronic combined systolic (congestive) and diastolic (congestive) heart failure (HCC)  Resolved Problems:    * No resolved hospital problems. *      Plan:  1. Sepsis secondary to Pyelonephritis/UTI:  WBC 13.5, Temp 101.7, heart rate 107, Urinalysis was positive for a UTI and blood cultures are positive for Gram positive cocci in clusters and Gram negative rods. Urine culture growing Klebsiella pneuomoniae. CT of the abd/pelvis showed perinephric stranding in transplant kidney. ID following. Continue Cefepime. 2. Acute respiratory failure with hypoxia:  Pulse ox was 88% on room air on upon arrival.  Chest xray showed pulmonary vascular congestion. Respiratory film array was negative. She continues to require 2 liters. Consider CT of the chest if no improvement.    3. Acute on chronic systolic and diastolic heart failure:

## 2021-11-19 NOTE — PROGRESS NOTES
ABG drawn x 1 from Left Radial. Patient had NormalAllen's Test.  Patient was on 2 liters/min via nasal cannula  at time of puncture. Pressure held for 5. No bleeding or bruising noted at puncture site.   Patient tolerated procedure well      Performed by Carol Andrews RCP

## 2021-11-20 ENCOUNTER — APPOINTMENT (OUTPATIENT)
Dept: INTERVENTIONAL RADIOLOGY/VASCULAR | Age: 66
DRG: 871 | End: 2021-11-20
Payer: MEDICARE

## 2021-11-20 LAB
ALBUMIN SERPL-MCNC: 3.1 G/DL (ref 3.5–5.2)
ALP BLD-CCNC: 51 U/L (ref 35–104)
ALT SERPL-CCNC: 17 U/L (ref 0–32)
ANION GAP SERPL CALCULATED.3IONS-SCNC: 12 MMOL/L (ref 7–16)
ANION GAP SERPL CALCULATED.3IONS-SCNC: 8 MMOL/L (ref 7–16)
APPEARANCE CSF: CLEAR
APPEARANCE CSF: CLEAR
APTT: 26.5 SEC (ref 24.5–35.1)
AST SERPL-CCNC: 34 U/L (ref 0–31)
B.E.: 1.1 MMOL/L (ref -3–3)
B.E.: 2.3 MMOL/L (ref -3–3)
BILIRUB SERPL-MCNC: 0.5 MG/DL (ref 0–1.2)
BUN BLDV-MCNC: 51 MG/DL (ref 6–23)
BUN BLDV-MCNC: 54 MG/DL (ref 6–23)
CALCIUM SERPL-MCNC: 9.4 MG/DL (ref 8.6–10.2)
CALCIUM SERPL-MCNC: 9.6 MG/DL (ref 8.6–10.2)
CHLORIDE BLD-SCNC: 103 MMOL/L (ref 98–107)
CHLORIDE BLD-SCNC: 104 MMOL/L (ref 98–107)
CO2: 25 MMOL/L (ref 22–29)
CO2: 25 MMOL/L (ref 22–29)
COHB: 0.3 % (ref 0–1.5)
COLOR CSF: COLORLESS
COLOR CSF: COLORLESS
CREAT SERPL-MCNC: 2.1 MG/DL (ref 0.5–1)
CREAT SERPL-MCNC: 2.1 MG/DL (ref 0.5–1)
CRITICAL: ABNORMAL
DATE ANALYZED: ABNORMAL
DATE OF COLLECTION: ABNORMAL
DELIVERY SYSTEMS: ABNORMAL
DEVICE: ABNORMAL
FIO2 ARTERIAL: 28
FIO2: 28 %
GFR AFRICAN AMERICAN: 29
GFR AFRICAN AMERICAN: 29
GFR NON-AFRICAN AMERICAN: 24 ML/MIN/1.73
GFR NON-AFRICAN AMERICAN: 24 ML/MIN/1.73
GLUCOSE BLD-MCNC: 103 MG/DL (ref 74–99)
GLUCOSE BLD-MCNC: 91 MG/DL (ref 74–99)
GLUCOSE, CSF: 71 MG/DL (ref 40–70)
HCO3 ARTERIAL: 29.8 MMOL/L (ref 22–26)
HCO3: 29.3 MMOL/L (ref 22–26)
HCT VFR BLD CALC: 38.2 % (ref 34–48)
HEMOGLOBIN: 11.2 G/DL (ref 11.5–15.5)
HHB: 7.5 % (ref 0–5)
INR BLD: 1
L. PNEUMOPHILA SEROGP 1 UR AG: NORMAL
LAB: ABNORMAL
LACTATE DEHYDROGENASE: 281 U/L (ref 135–214)
Lab: ABNORMAL
MAGNESIUM: 2.4 MG/DL (ref 1.6–2.6)
MCH RBC QN AUTO: 31 PG (ref 26–35)
MCHC RBC AUTO-ENTMCNC: 29.3 % (ref 32–34.5)
MCV RBC AUTO: 105.8 FL (ref 80–99.9)
METHB: 0.3 % (ref 0–1.5)
MODE: ABNORMAL
MONOCYTE, CSF: 100 % (ref 10–70)
MONOCYTE, CSF: 100 % (ref 10–70)
NEUTROPHILS, CSF: 0 % (ref 0–10)
NEUTROPHILS, CSF: 0 % (ref 0–10)
O2 CONTENT: 15.8 ML/DL
O2 SATURATION: 89.6 % (ref 92–98.5)
O2 SATURATION: 92.5 % (ref 92–98.5)
O2HB: 91.9 % (ref 94–97)
OPERATOR ID: 2124
OPERATOR ID: 797
PATIENT TEMP: 37 C
PCO2 ARTERIAL: 58.3 MMHG (ref 35–45)
PCO2: 64.8 MMHG (ref 35–45)
PDW BLD-RTO: 14.4 FL (ref 11.5–15)
PEEP/CPAP: 6 CMH2O
PFO2: 2.5 MMHG/%
PH BLOOD GAS: 7.27 (ref 7.35–7.45)
PH BLOOD GAS: 7.32 (ref 7.35–7.45)
PHOSPHORUS: 3.8 MG/DL (ref 2.5–4.5)
PIP: 15 CMH2O
PLATELET # BLD: 110 E9/L (ref 130–450)
PMV BLD AUTO: 14.1 FL (ref 7–12)
PO2 ARTERIAL: 64.3 MMHG (ref 60–80)
PO2: 70 MMHG (ref 75–100)
POSITIVE END EXP PRESS: 5 CMH2O
POTASSIUM SERPL-SCNC: 5.3 MMOL/L (ref 3.5–5)
POTASSIUM SERPL-SCNC: 6.1 MMOL/L (ref 3.5–5)
POTASSIUM SERPL-SCNC: 7.2 MMOL/L (ref 3.5–5)
PRESSURE SUPPORT: 16 CMH2O
PROTEIN CSF: 20 MG/DL (ref 15–40)
PROTHROMBIN TIME: 11.5 SEC (ref 9.3–12.4)
RBC # BLD: 3.61 E12/L (ref 3.5–5.5)
RBC CSF: <2000 /UL
RBC CSF: <2000 /UL
REASON FOR REJECTION: NORMAL
REJECTED TEST: NORMAL
RI(T): 0.66
SEDIMENTATION RATE, ERYTHROCYTE: 62 MM/HR (ref 0–20)
SODIUM BLD-SCNC: 137 MMOL/L (ref 132–146)
SODIUM BLD-SCNC: 140 MMOL/L (ref 132–146)
SOURCE, BLOOD GAS: ABNORMAL
SOURCE, BLOOD GAS: ABNORMAL
STREP PNEUMONIAE ANTIGEN, URINE: NORMAL
THB: 12.2 G/DL (ref 11.5–16.5)
TIME ANALYZED: 847
TOTAL PROTEIN: 5.9 G/DL (ref 6.4–8.3)
TUBE NUMBER CSF: ABNORMAL
TUBE NUMBER CSF: ABNORMAL
VANCOMYCIN RANDOM: 9.5 MCG/ML (ref 5–40)
WBC # BLD: 9 E9/L (ref 4.5–11.5)
WBC CSF: <3 /UL (ref 0–2)
WBC CSF: <3 /UL (ref 0–2)

## 2021-11-20 PROCEDURE — 82945 GLUCOSE OTHER FLUID: CPT

## 2021-11-20 PROCEDURE — 6360000002 HC RX W HCPCS: Performed by: NURSE PRACTITIONER

## 2021-11-20 PROCEDURE — 6370000000 HC RX 637 (ALT 250 FOR IP): Performed by: INTERNAL MEDICINE

## 2021-11-20 PROCEDURE — 85651 RBC SED RATE NONAUTOMATED: CPT

## 2021-11-20 PROCEDURE — 80048 BASIC METABOLIC PNL TOTAL CA: CPT

## 2021-11-20 PROCEDURE — 82040 ASSAY OF SERUM ALBUMIN: CPT

## 2021-11-20 PROCEDURE — 84157 ASSAY OF PROTEIN OTHER: CPT

## 2021-11-20 PROCEDURE — 83873 ASSAY OF CSF PROTEIN: CPT

## 2021-11-20 PROCEDURE — 1200000000 HC SEMI PRIVATE

## 2021-11-20 PROCEDURE — 2500000003 HC RX 250 WO HCPCS: Performed by: INTERNAL MEDICINE

## 2021-11-20 PROCEDURE — 87070 CULTURE OTHR SPECIMN AEROBIC: CPT

## 2021-11-20 PROCEDURE — 86618 LYME DISEASE ANTIBODY: CPT

## 2021-11-20 PROCEDURE — 94640 AIRWAY INHALATION TREATMENT: CPT

## 2021-11-20 PROCEDURE — 85730 THROMBOPLASTIN TIME PARTIAL: CPT

## 2021-11-20 PROCEDURE — 2580000003 HC RX 258: Performed by: INTERNAL MEDICINE

## 2021-11-20 PROCEDURE — 87205 SMEAR GRAM STAIN: CPT

## 2021-11-20 PROCEDURE — 83916 OLIGOCLONAL BANDS: CPT

## 2021-11-20 PROCEDURE — 86789 WEST NILE VIRUS ANTIBODY: CPT

## 2021-11-20 PROCEDURE — 89051 BODY FLUID CELL COUNT: CPT

## 2021-11-20 PROCEDURE — 83615 LACTATE (LD) (LDH) ENZYME: CPT

## 2021-11-20 PROCEDURE — 83735 ASSAY OF MAGNESIUM: CPT

## 2021-11-20 PROCEDURE — 85027 COMPLETE CBC AUTOMATED: CPT

## 2021-11-20 PROCEDURE — 62328 DX LMBR SPI PNXR W/FLUOR/CT: CPT | Performed by: RADIOLOGY

## 2021-11-20 PROCEDURE — 6370000000 HC RX 637 (ALT 250 FOR IP): Performed by: NURSE PRACTITIONER

## 2021-11-20 PROCEDURE — 87529 HSV DNA AMP PROBE: CPT

## 2021-11-20 PROCEDURE — 86695 HERPES SIMPLEX TYPE 1 TEST: CPT

## 2021-11-20 PROCEDURE — 80202 ASSAY OF VANCOMYCIN: CPT

## 2021-11-20 PROCEDURE — 2580000003 HC RX 258: Performed by: NURSE PRACTITIONER

## 2021-11-20 PROCEDURE — 94660 CPAP INITIATION&MGMT: CPT

## 2021-11-20 PROCEDURE — 86696 HERPES SIMPLEX TYPE 2 TEST: CPT

## 2021-11-20 PROCEDURE — 82784 ASSAY IGA/IGD/IGG/IGM EACH: CPT

## 2021-11-20 PROCEDURE — 85610 PROTHROMBIN TIME: CPT

## 2021-11-20 PROCEDURE — 82042 OTHER SOURCE ALBUMIN QUAN EA: CPT

## 2021-11-20 PROCEDURE — 82803 BLOOD GASES ANY COMBINATION: CPT

## 2021-11-20 PROCEDURE — 88108 CYTOPATH CONCENTRATE TECH: CPT

## 2021-11-20 PROCEDURE — APPSS30 APP SPLIT SHARED TIME 16-30 MINUTES: Performed by: NURSE PRACTITIONER

## 2021-11-20 PROCEDURE — 86788 WEST NILE VIRUS AB IGM: CPT

## 2021-11-20 PROCEDURE — 82805 BLOOD GASES W/O2 SATURATION: CPT

## 2021-11-20 PROCEDURE — 36600 WITHDRAWAL OF ARTERIAL BLOOD: CPT

## 2021-11-20 PROCEDURE — 80053 COMPREHEN METABOLIC PANEL: CPT

## 2021-11-20 PROCEDURE — 86593 SYPHILIS TEST NON-TREP QUANT: CPT

## 2021-11-20 PROCEDURE — 86694 HERPES SIMPLEX NES ANTBDY: CPT

## 2021-11-20 PROCEDURE — 36415 COLL VENOUS BLD VENIPUNCTURE: CPT

## 2021-11-20 PROCEDURE — 99233 SBSQ HOSP IP/OBS HIGH 50: CPT | Performed by: INTERNAL MEDICINE

## 2021-11-20 PROCEDURE — 84132 ASSAY OF SERUM POTASSIUM: CPT

## 2021-11-20 PROCEDURE — 86592 SYPHILIS TEST NON-TREP QUAL: CPT

## 2021-11-20 PROCEDURE — 84100 ASSAY OF PHOSPHORUS: CPT

## 2021-11-20 RX ORDER — DEXTROSE MONOHYDRATE 25 G/50ML
25 INJECTION, SOLUTION INTRAVENOUS ONCE
Status: COMPLETED | OUTPATIENT
Start: 2021-11-20 | End: 2021-11-20

## 2021-11-20 RX ORDER — SODIUM CHLORIDE 0.9 % (FLUSH) 0.9 %
5-40 SYRINGE (ML) INJECTION PRN
Status: DISCONTINUED | OUTPATIENT
Start: 2021-11-20 | End: 2021-11-25 | Stop reason: SDUPTHER

## 2021-11-20 RX ORDER — SODIUM CHLORIDE 0.9 % (FLUSH) 0.9 %
5-40 SYRINGE (ML) INJECTION EVERY 12 HOURS SCHEDULED
Status: DISCONTINUED | OUTPATIENT
Start: 2021-11-20 | End: 2021-11-25 | Stop reason: SDUPTHER

## 2021-11-20 RX ORDER — ARFORMOTEROL TARTRATE 15 UG/2ML
15 SOLUTION RESPIRATORY (INHALATION) 2 TIMES DAILY
Status: DISCONTINUED | OUTPATIENT
Start: 2021-11-20 | End: 2021-11-27 | Stop reason: HOSPADM

## 2021-11-20 RX ORDER — BUDESONIDE 0.5 MG/2ML
500 INHALANT ORAL 2 TIMES DAILY
Status: DISCONTINUED | OUTPATIENT
Start: 2021-11-20 | End: 2021-11-27 | Stop reason: HOSPADM

## 2021-11-20 RX ORDER — SODIUM CHLORIDE 9 MG/ML
25 INJECTION, SOLUTION INTRAVENOUS PRN
Status: DISCONTINUED | OUTPATIENT
Start: 2021-11-20 | End: 2021-11-25 | Stop reason: SDUPTHER

## 2021-11-20 RX ADMIN — BUDESONIDE 500 MCG: 0.5 INHALANT RESPIRATORY (INHALATION) at 18:39

## 2021-11-20 RX ADMIN — ARFORMOTEROL TARTRATE 15 MCG: 15 SOLUTION RESPIRATORY (INHALATION) at 18:38

## 2021-11-20 RX ADMIN — HEPARIN SODIUM 5000 UNITS: 5000 INJECTION INTRAVENOUS; SUBCUTANEOUS at 05:49

## 2021-11-20 RX ADMIN — BUDESONIDE 500 MCG: 0.5 INHALANT RESPIRATORY (INHALATION) at 09:44

## 2021-11-20 RX ADMIN — ACYCLOVIR SODIUM 700 MG: 50 INJECTION, SOLUTION INTRAVENOUS at 21:28

## 2021-11-20 RX ADMIN — ARFORMOTEROL TARTRATE 15 MCG: 15 SOLUTION RESPIRATORY (INHALATION) at 09:44

## 2021-11-20 RX ADMIN — LEVOTHYROXINE SODIUM 50 MCG: 0.05 TABLET ORAL at 05:49

## 2021-11-20 RX ADMIN — SODIUM CHLORIDE 25 ML: 9 INJECTION, SOLUTION INTRAVENOUS at 05:43

## 2021-11-20 RX ADMIN — IPRATROPIUM BROMIDE AND ALBUTEROL SULFATE 1 AMPULE: .5; 2.5 SOLUTION RESPIRATORY (INHALATION) at 09:44

## 2021-11-20 RX ADMIN — IPRATROPIUM BROMIDE AND ALBUTEROL SULFATE 1 AMPULE: .5; 2.5 SOLUTION RESPIRATORY (INHALATION) at 18:38

## 2021-11-20 RX ADMIN — HEPARIN SODIUM 5000 UNITS: 5000 INJECTION INTRAVENOUS; SUBCUTANEOUS at 21:28

## 2021-11-20 RX ADMIN — INSULIN HUMAN 7 UNITS: 100 INJECTION, SOLUTION PARENTERAL at 19:36

## 2021-11-20 RX ADMIN — SODIUM CHLORIDE 25 ML: 9 INJECTION, SOLUTION INTRAVENOUS at 19:35

## 2021-11-20 RX ADMIN — ACYCLOVIR SODIUM 700 MG: 50 INJECTION, SOLUTION INTRAVENOUS at 14:12

## 2021-11-20 RX ADMIN — SODIUM CHLORIDE: 9 INJECTION, SOLUTION INTRAVENOUS at 08:56

## 2021-11-20 RX ADMIN — CEFEPIME HYDROCHLORIDE 1000 MG: 1 INJECTION, POWDER, FOR SOLUTION INTRAMUSCULAR; INTRAVENOUS at 02:18

## 2021-11-20 RX ADMIN — DEXTROSE MONOHYDRATE 25 G: 25 INJECTION, SOLUTION INTRAVENOUS at 19:36

## 2021-11-20 RX ADMIN — IPRATROPIUM BROMIDE AND ALBUTEROL SULFATE 1 AMPULE: .5; 2.5 SOLUTION RESPIRATORY (INHALATION) at 06:10

## 2021-11-20 RX ADMIN — CEFEPIME HYDROCHLORIDE 1000 MG: 1 INJECTION, POWDER, FOR SOLUTION INTRAMUSCULAR; INTRAVENOUS at 15:37

## 2021-11-20 RX ADMIN — IPRATROPIUM BROMIDE AND ALBUTEROL SULFATE 1 AMPULE: .5; 2.5 SOLUTION RESPIRATORY (INHALATION) at 14:43

## 2021-11-20 ASSESSMENT — PAIN DESCRIPTION - PAIN TYPE: TYPE: CHRONIC PAIN

## 2021-11-20 ASSESSMENT — PAIN SCALES - GENERAL
PAINLEVEL_OUTOF10: 0

## 2021-11-20 NOTE — PROGRESS NOTES
Pt transferred to 5th floor room 518-2  Vital signs stable   Bipap and respiratory therapist in room

## 2021-11-20 NOTE — PROGRESS NOTES
ABG drawn x 1 from Right Radial. Patient had Normal Leandro's Test.  Patient was on 28% O2 via Bipap 15/6 at time of puncture. Pressure held for 5. No bleeding or bruising noted at puncture site. Patient tolerated procedure well.       Performed by Nikhil Spicer RCP

## 2021-11-20 NOTE — PROGRESS NOTES
[facility] Roberto A. Fern Sake, MD Alcus Founds, MD Carrolyn Romberg, MD. Estevan Alas MD   Progress Note    11/20/2021    SUBJECTIVE:   Seen in her room , was sleeping on CPAP , no LE edema , skin wrinkling in LE . No JVD   Case d/w RN   UO good   Cr slightly better /stable   PROBLEM LIST:    Principal Problem:    Sepsis secondary to UTI [facility])  Active Problems:    Depression    Hypertension    Acute kidney injury superimposed on CKD ([facility] [address]    Thyroid disease    Acute on chronic combined systolic (congestive) and diastolic (congestive) heart failure (HCC)  Resolved Problems:    * No resolved hospital problems.  *       DIET:    Diet NPO       Allergies : Macrodantin [nitrofurantoin macrocrystal] and Sulfa antibiotics    Past Medical History:   Diagnosis Date    Abnormal EKG     left bundle branch block    Acute gout involving toe of right foot 9/3/2020    Acute gout involving toe of right foot 9/3/2020    Acute on chronic combined systolic (congestive) and diastolic (congestive) heart failure (HCC)     Cancer (HCC)     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    Cerebrovascular disease     Clotting disorder ([facility] [address] 1985    thrombophlebitis after c section delivery    Depression     15 years followed by dr Lennox Goldman Hyperlipidemia     Hypertension     Hyperthyroidism     Leg swelling 9/3/2020    Lymphedema of both lower extremities 9/3/2020    Peripheral vascular disease ([facility] [address]     Renal failure 11/2012    renal transplant    Thrombophlebitis     after c section delivery    Thyroid disease        Past Surgical History:   Procedure Laterality Date   [address]    one normal delivery    COLONOSCOPY      DIAGNOSTIC CARDIAC CATH LAB PROCEDURE  2006    normal coronaries and 1996 normal coronaries    DIALYSIS FISTULA CREATION  march and july 2012    phase one and two patient will start dialysis when needed   [address]    transfemoral venous bypass grafts    KIDNEY TRANSPLANT  2016    KIDNEY TRANSPLANT  2015    KIDNEY TRANSPLANT  2015    PARATHYROIDECTOMY  2006    left parathyroid  implant and parathyroidectomy       Family History   Problem Relation Age of Onset    Diabetes Mother     Diabetes Father     Dementia Father         reports that she quit smoking about 6 years ago. She has a 20.00 pack-year smoking history. She has never used smokeless tobacco. She reports that she does not drink alcohol and does not use drugs. Review of Systems:   Constitutional: no fevers , no chills , feels ok   Eyes: no eye pain , no itching , no drainage  Ears, nose, mouth, throat, and face: no ear ,nose pain , hearing is ok ,no nasal drainage   Respiratory: no sob ,no cough ,no wheezing . Cardiovascular: no chest pain , no palpitation ,no sob . Gastrointestinal: no nausea, vomiting , constipation , no abdominal pain . Genitourinary:no urinary retention , no burning , dysuria . No polyuria   Hematologic/lymphatic: no bleeding , no cougulation issues . Musculoskeletal:no joint pain , no swelling . Neurological: no headaches ,no weakness , no numbness . Endocrine: no thirst , no weight issues .      MEDS (scheduled):    sodium chloride flush  5-40 mL IntraVENous 2 times per day    Arformoterol Tartrate  15 mcg Nebulization BID    budesonide  500 mcg Nebulization BID    acyclovir  10 mg/kg (Adjusted) IntraVENous Q12H    cefepime (MAXIPIME) IVPB extended  1,000 mg IntraVENous Q12H    allopurinol  100 mg Oral Daily    [Held by provider] cloZAPine  50 mg Oral Nightly    cycloSPORINE modified  100 mg Oral BID    desvenlafaxine succinate  50 mg Oral QPM    docusate sodium  100 mg Oral BID    folic acid  1 mg Oral Daily    levothyroxine  50 mcg Oral Daily    [Held by provider] lisinopril  5 mg Oral Daily    metoprolol succinate  50 mg Oral Daily    predniSONE  5 mg Oral Daily    pregabalin  75 mg Oral BID    atorvastatin  10 mg Oral Daily    [Held by provider] heparin (porcine)  5,000 Units SubCUTAneous 3 times per day    mycophenolate  1,000 mg Oral BID    ipratropium-albuterol  1 ampule Inhalation Q4H WA       MEDS (infusions):   sodium chloride      sodium chloride 75 mL/hr at 11/20/21 0856    sodium chloride 25 mL (11/20/21 0543)       MEDS (prn):  sodium chloride flush, sodium chloride, ondansetron **OR** ondansetron, acetaminophen **OR** acetaminophen, polyethylene glycol, perflutren lipid microspheres    PHYSICAL EXAM:     Patient Vitals for the past 24 hrs:   BP Temp Temp src Pulse Resp SpO2   11/20/21 0945 -- -- -- -- 16 --   11/20/21 0813 (!) 115/55 98.8 °F (37.1 °C) Axillary 83 20 92 %   11/20/21 0610 -- -- -- -- 21 --   11/20/21 0202 -- -- -- -- 17 --   11/20/21 0030 128/74 98.9 °F (37.2 °C) Axillary 79 16 94 %   11/19/21 1930 (!) 109/55 97.7 °F (36.5 °C) Temporal 84 18 96 %   11/19/21 1910 -- -- -- -- 17 --   @      Intake/Output Summary (Last 24 hours) at 11/20/2021 1130  Last data filed at 11/20/2021 7104  Gross per 24 hour   Intake 650 ml   Output 700 ml   Net -50 ml         Wt Readings from Last 3 Encounters:   11/17/21 227 lb 3.2 oz (103.1 kg)   08/16/21 229 lb (103.9 kg)   10/01/20 245 lb 8 oz (111.4 kg)       Constitutional:  in no acute distress  Oral: mucus membranes moist  Neck: no JVD  Cardiovascular: S1, S2 regular rhythm, no murmur,or rub  Respiratory:  No crackles, no wheeze  Gastrointestinal:  Soft, nontender, nondistended, NABS  Ext: no edema, feet warm  Skin: dry, no rash  Neuro: awake, alert, interactive      DATA:    Recent Labs     11/18/21  0543 11/19/21  0703 11/20/21  0928   WBC 12.9* 11.7* 9.0   HGB 13.8 12.7 11.2*   HCT 45.7 41.4 38.2   .5* 104.3* 105.8*   PLT 78* 85* 110*     Recent Labs     11/18/21  0543 11/19/21  0703 11/20/21  0928    138 140   K 4.8 5.0 5.3*    101 103   CO2 23 25 25   MG 1.6 2.4 2.4   PHOS 4.9* 4.4 3.8   BUN 30* 44* 51*   CREATININE 2.1* 2.3* 2.1*   ALT 8 14 17   AST 29 44* 34*   BILITOT 0.8 0.5 0.5   ALKPHOS 59 60 51       No results found for: LABPROT    ASSESSMENT / RECOMMENDATIONS:      1. Acute kidney injury felt to be related to volume depletion in  context of urosepsis. 2.  Chronic kidney disease, stage 2 in the patient with kidney  transplant. Baseline _____ function with a creatinine of 1.1 to 1.3.  3. Immunosuppressant status, chronically on CellCept 1 gm b.i.d.,  cyclosporine 100 mg b.i.d., and prednisone. 4.  Urosepsis with bacteremia management per ID service.     PLAN:      Pt clinically euvolemic to hypovolemic . CXR with ? Pleural effusion   Very poor po intake  UO ok   Cr stable/better    -continue NS at conservative rate 75 cc/hr till am   -Continue current immunosuppressant.   -f/u serial bmp , UO         Electronically signed by Pio Carlos MD on 11/20/2021 at 11:30 AM

## 2021-11-20 NOTE — PROGRESS NOTES
Patient transferred from 4th floor. Bed moved to 18-2. Vital signs within normal limits. RT applied Bipap with O2 as ordered.

## 2021-11-20 NOTE — PROGRESS NOTES
[facility]     [address] [place] [address] 80  [place], [address]  Phone [phone]   [phone]      Admit Date: 2021  4:50 AM  Pt Name: Zeeshan Barrett  MRN: [id number]  : 1955  Reason for Consult:    Chief Complaint   Patient presents with    Urinary Tract Infection     Requesting Physician:  Kaye Amador MD  PCP: Sylvia Lowe MD  History Obtained From:  patient, chart   ID consulted for Sepsis due to urinary tract infection ([address] [A41.9, N39.0]  Urinary tract infection without hematuria, site unspecified [N39.0]  Sepsis, due to unspecified organism, unspecified whether acute organ dysfunction present ([address] [A41.9]  on hospital day 3   Face to face encounter   CHIEF COMPLAINT       Chief Complaint   Patient presents with    Urinary Tract Infection     HISTORYOF PRESENT ILLNESS   Zeeshan Barrett is a 73 y.o. female who presents with   has a past medical history of Abnormal EKG, Acute gout involving toe of right foot, Acute gout involving toe of right foot, Acute on chronic combined systolic (congestive) and diastolic (congestive) heart failure ([address] Cancer ([address] Cerebrovascular disease, Clotting disorder ([address] Depression, Hyperlipidemia, Hypertension, Hyperthyroidism, Leg swelling, Lymphedema of both lower extremities, Peripheral vascular disease ([address] Renal failure, Thrombophlebitis, and Thyroid disease. Urinary Tract Infection        Patient admitted to hospital from home. She was weak, reports dysuria, and generalized weakness. She has been having fever and chills at home. She was hypoxic at home- 88 %. She has a cough. She was admitted for further evaluation. She is known to ID service- she was last seen in 2019-for a klebsiella UTI- was discharge with cefepime due to tachycardia and prolonged QT with cipro.      She is currently on IV cefepime  Her temp has been as high as 101.7   She has a cough - and is tachypnic     ID has been asked to evaluate and manage atbs  Current visit  Dos  11/20/21   PT IN BED BIPAP  MORE ALERT   HAS NECK   S/P LP  REVIEW OF SYSTEMS     CONSTITUTIONAL:   AS IN HPI   CURRENT MEDICATIONS     Current Facility-Administered Medications:     sodium chloride flush 0.9 % injection 5-40 mL, 5-40 mL, IntraVENous, 2 times per day, Kwame Nay, APRN - CNP    sodium chloride flush 0.9 % injection 5-40 mL, 5-40 mL, IntraVENous, PRN, Kwame Nay, APRN - CNP    0.9 % sodium chloride infusion, 25 mL, IntraVENous, PRN, Kwame Nay, APRN - CNP    Arformoterol Tartrate (BROVANA) nebulizer solution 15 mcg, 15 mcg, Nebulization, BID, Kwame Nay, APRN - CNP, 15 mcg at 11/20/21 1838    budesonide (PULMICORT) nebulizer suspension 500 mcg, 500 mcg, Nebulization, BID, Kwame Nay, APRN - CNP, 500 mcg at 11/20/21 1839    acyclovir (ZOVIRAX) 700 mg in dextrose 5 % 100 mL IVPB, 10 mg/kg (Adjusted), IntraVENous, Q12H, Kwame Nay, APRN - CNP, Stopped at 11/20/21 1512    0.9 % sodium chloride infusion, , IntraVENous, Continuous, Isaías Black MD, Last Rate: 75 mL/hr at 11/20/21 0856, New Bag at 11/20/21 0856    cefepime (MAXIPIME) 1,000 mg in dextrose 5 % 50 mL extended infusion IVPB, 1,000 mg, IntraVENous, Q12H, Last Rate: 12.5 mL/hr at 11/20/21 1537, 1,000 mg at 11/20/21 1537 **AND** 0.9 % sodium chloride infusion, 25 mL, IntraVENous, Q12H, Kwame Naruth ann APRN - CNP, Last Rate: 12.5 mL/hr at 11/20/21 0543, 25 mL at 11/20/21 0543    allopurinol (ZYLOPRIM) tablet 100 mg, 100 mg, Oral, Daily, Kwame Nay, APRN - CNP, 100 mg at 11/19/21 1050    [Held by provider] cloZAPine (CLOZARIL) tablet 50 mg, 50 mg, Oral, Nightly, KATINA Velázquez CNP, 50 mg at 11/19/21 2213    cycloSPORINE modified (NEORAL) capsule 100 mg, 100 mg, Oral, BID, KATINA Velázquez - CNP, 100 mg at 11/19/21 2213    desvenlafaxine succinate (PRISTIQ) extended release tablet 50 mg, 50 mg, Oral, QPM, Fritz Boom, APRN - CNP, 50 mg at 11/19/21 1826    docusate sodium (COLACE) capsule 100 mg, 100 mg, Oral, BID, Fritz Boom, APRN - CNP, 100 mg at 36/67/78 4263    folic acid (FOLVITE) tablet 1 mg, 1 mg, Oral, Daily, Fritz Boom, APRN - CNP, 1 mg at 11/19/21 1050    levothyroxine (SYNTHROID) tablet 50 mcg, 50 mcg, Oral, Daily, Fritz Boom, APRN - CNP, 50 mcg at 11/20/21 0549    [Held by provider] lisinopril (PRINIVIL;ZESTRIL) tablet 5 mg, 5 mg, Oral, Daily, Fritz Boom, APRN - CNP, 5 mg at 11/17/21 1430    metoprolol succinate (TOPROL XL) extended release tablet 50 mg, 50 mg, Oral, Daily, Fritz Boom, APRN - CNP, 50 mg at 11/19/21 1050    predniSONE (DELTASONE) tablet 5 mg, 5 mg, Oral, Daily, Fritz Boom, APRN - CNP, 5 mg at 11/19/21 1050    pregabalin (LYRICA) capsule 75 mg, 75 mg, Oral, BID, Fritz Boom, APRN - CNP, 75 mg at 11/19/21 2213    atorvastatin (LIPITOR) tablet 10 mg, 10 mg, Oral, Daily, Fritz Boom, APRN - CNP, 10 mg at 11/19/21 1050    ondansetron (ZOFRAN-ODT) disintegrating tablet 4 mg, 4 mg, Oral, Q8H PRN **OR** ondansetron (ZOFRAN) injection 4 mg, 4 mg, IntraVENous, Q6H PRN, Fritz Boom, APRN - CNP    acetaminophen (TYLENOL) tablet 650 mg, 650 mg, Oral, Q6H PRN, 650 mg at 11/17/21 2005 **OR** acetaminophen (TYLENOL) suppository 650 mg, 650 mg, Rectal, Q6H PRN, Fritz Boom, APRN - CNP    polyethylene glycol (GLYCOLAX) packet 17 g, 17 g, Oral, Daily PRN, Fritz Boom, APRN - CNP    heparin (porcine) injection 5,000 Units, 5,000 Units, SubCUTAneous, 3 times per day, Firtz Boom, APRN - CNP, 5,000 Units at 11/20/21 0549    perflutren lipid microspheres (DEFINITY) injection 1.65 mg, 1.5 mL, IntraVENous, ONCE PRN, Fritz Boom, APRN - CNP    mycophenolate (CELLCEPT) capsule 1,000 mg, 1,000 mg, Oral, BID, Fritz Boom, APRN - CNP, 1,000 mg at 11/19/21 7084    ipratropium-albuterol (DUONEB) nebulizer solution 1 ampule, 1 ampule, Inhalation, Q4H WA, Kwame Renata, APRN - CNP, 1 ampule at 11/20/21 6212  ALLERGIES     Macrodantin [nitrofurantoin macrocrystal] and Sulfa antibiotics  Immunization History   Administered Date(s) Administered    COVID-19, [place] Peter, PF, 30mcg/0.3mL 01/28/2021, 02/18/2021, 08/18/2021      Internal Administration   First Dose COVID-19, Pfizer, PF, 30mcg/0.3mL  01/28/2021   Second Dose COVID-19, Pfizer, PF, 30mcg/0.3mL   02/18/2021       Last COVID Lab SARS-CoV-2 (no units)   Date Value   01/20/2021 Not Detected     SARS-CoV-2, PCR (no units)   Date Value   11/17/2021 Not Detected     SARS-CoV-2, NAAT (no units)   Date Value   11/17/2021 Not Detected        PAST MEDICAL HISTORY     Past Medical History:   Diagnosis Date    Abnormal EKG     left bundle branch block    Acute gout involving toe of right foot 9/3/2020    Acute gout involving toe of right foot 9/3/2020    Acute on chronic combined systolic (congestive) and diastolic (congestive) heart failure (HCC)     Cancer (HCC)     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    Cerebrovascular disease     Clotting disorder ([address] 1985    thrombophlebitis after c section delivery    Depression     15 years followed by dr Saloni Guerrero    Hyperlipidemia     Hypertension     Hyperthyroidism     Leg swelling 9/3/2020    Lymphedema of both lower extremities 9/3/2020    Peripheral vascular disease ([address]     Renal failure 11/2012    renal transplant    Thrombophlebitis     after c section delivery    Thyroid disease      SURGICAL HISTORY       Past Surgical History:   Procedure Laterality Date   [address]    one normal delivery    COLONOSCOPY      DIAGNOSTIC CARDIAC CATH LAB PROCEDURE  2006    normal coronaries and 1996 normal coronaries    DIALYSIS FISTULA CREATION  march and july 2012    phase one and two patient will start dialysis when needed   [address]    transfemoral venous bypass grafts    KIDNEY TRANSPLANT  2016    KIDNEY TRANSPLANT  2015    KIDNEY TRANSPLANT  2015    PARATHYROIDECTOMY  2006    left parathyroid  implant and parathyroidectomy     ·      PHYSICAL EXAM          Vitals:    11/20/21 0813 11/20/21 0945 11/20/21 1300 11/20/21 1615   BP: (!) 115/55   130/60   Pulse: 83   95   Resp: 20 16  16   Temp: 98.8 °F (37.1 °C)   98.2 °F (36.8 °C)   TempSrc: Axillary   Axillary   SpO2: 92%  95% 92%   Weight:       Height:         Physical Exam   Constitutional/General:  AROUSABLE  ANSWER QUESTIONS  Head: NC/AT  NECK PAIN   Eyes: PERRL, EOMI  Pulmonary: Lungs DEC BS ANT  BIPAP  Cardiovascular: Tachy   Abdomen: Soft, + BS.    distension. Nontender. Extremities: Moves all extremities x 4. Warm and well perfused,++ edema   Skin: Warm and dry    Neurologic:    No focal deficits  Psych: Normal Affect  PIV     DIAGNOSTIC RESULTS   RADIOLOGY:   XR CHEST (2 VW)    Result Date: 11/17/2021  EXAMINATION: TWO XRAY VIEWS OF THE CHEST 11/17/2021 9:31 am COMPARISON: None. HISTORY: ORDERING SYSTEM PROVIDED HISTORY: elevated BNP, wheezing TECHNOLOGIST PROVIDED HISTORY: Reason for exam:->elevated BNP, wheezing FINDINGS: There is prominence of the pulmonary vasculature. There is no effusion or pneumothorax. The cardiomediastinal silhouette is stable in size. The osseous structures are without acute process. Mild pulmonary vascular congestion. XR CHEST 1 VIEW    Result Date: 11/17/2021  EXAMINATION: ONE XRAY VIEW OF THE CHEST 11/17/2021 5:17 am COMPARISON: 2nd June 2020 HISTORY: ORDERING SYSTEM PROVIDED HISTORY: sepsis TECHNOLOGIST PROVIDED HISTORY: Reason for exam:->sepsis FINDINGS: Heart is mildly enlarged. Pulmonary vascularity is normal.  Lungs are clear. Neither costophrenic angle is blunted. Mild cardiomegaly.      LABS  Recent Labs     11/18/21  0543 11/19/21  0703 11/20/21  0928   WBC 12.9* 11.7* 9.0   HGB 13.8 12.7 11.2*   HCT 45.7 41.4 38.2   .5* 104.3* 105.8*   PLT 78* 85* 110*     Recent Labs 11/18/21  0543 11/18/21  0543 11/19/21  0703 11/19/21  0703 11/20/21  0425 11/20/21  0928 11/20/21  1432 11/20/21  1432 11/20/21  1607      < > 138  --  140  --  137  --   --    K 4.8   < > 5.0   < > 5.3*   < > 6.1*   < > 7.2*      < > 101   < > 103   < > 104  --   --    CO2 23   < > 25   < > 25   < > 25  --   --    BUN 30*   < > 44*  --  51*  --  54*  --   --    CREATININE 2.1*   < > 2.3*  --  2.1*  --  2.1*  --   --    GFRAA 29   < > 26  --  29  --  29  --   --    LABGLOM 24   < > 21  --  24  --  24  --   --    GLUCOSE 89   < > 102*  --  91  --  103*  --   --    PROT 6.1*  --  6.4  --  5.9*  --   --   --   --    LABALBU 3.2*  --  3.2*  --  3.1*  --   --   --   --    CALCIUM 9.6   < > 9.7  --  9.6  --  9.4  --   --    BILITOT 0.8  --  0.5  --  0.5  --   --   --   --    ALKPHOS 59  --  60  --  51  --   --   --   --    AST 29  --  44*  --  34*  --   --   --   --    ALT 8  --  14  --  17  --   --   --   --     < > = values in this interval not displayed.      Lab Results   Component Value Date    COVID19 Not Detected 11/17/2021     COVID-19/MARINA-COV2 LABS  Recent Labs     11/18/21  0543 11/19/21 0703 11/20/21 0928   FERRITIN 555  --   --    LDH  --   --  281*   INR  --   --  1.0   PROTIME  --   --  11.5   AST 29 44* 34*   ALT 8 14 17     MICROBIOLOGY:     Cultures :   Lab Results   Component Value Date    BC 24 Hours no growth 11/17/2021    BC  12/17/2019     Refer to previous culture of CXBLD from 12-17-19 @1120 for  susceptibility results      ORG Klebsiella pneumoniae ssp pneumoniae 11/17/2021    ORG Staphylococcus coagulase-negative 11/17/2021    ORG Klebsiella pneumoniae ssp pneumoniae 11/17/2021     Lab Results   Component Value Date    BLOODCULT2  11/17/2021     Gram stain performed from blood culture bottle media  Gram positive cocci in clusters  Gram negative rods      BLOODCULT2 Identification and sensitivity to follow 11/17/2021    BLOODCULT2  11/17/2021     This organism was isolated in one set.  Susceptibility testing is not routinely done as this  organism frequently represents skin contamination. Additional testing can be ordered by calling the  Microbiology Department. ORG Klebsiella pneumoniae ssp pneumoniae 11/17/2021    ORG Staphylococcus coagulase-negative 11/17/2021    ORG Klebsiella pneumoniae ssp pneumoniae 11/17/2021     Urine Culture, Routine   Date Value Ref Range Status   11/17/2021 <10,000 CFU/mL  Mixed gram positive organisms   (A)  Final   11/17/2021 >100,000 CFU/ml  Final   04/09/2021   Final    10 to 100,000 CFU/mL  Mixed rubio isolated. Further workup and sensitivity testing  is not routinely indicated and will not be performed.   Mixed rubio isolated includes:  Escherichia coli  Mixed gram positive organisms  Alpha hemolytic Strep species  Yeast  Nonhemolytic Strep species       FINAL IMPRESSION    Patient is a 73 y.o. female who presented with   Chief Complaint   Patient presents with    Urinary Tract Infection    and admitted for Sepsis due to urinary tract infection ([facility] [A41.9, N39.0]  Urinary tract infection without hematuria, site unspecified [N39.0]  Sepsis, due to unspecified organism, unspecified whether acute organ dysfunction present ([facility] [A41.9]  · Fevers BETTER  · Leukocytosis  BETTER  · UTI/ dysuria  MIXED GPO/KLEBSIELLA  · Gn septicemia/prob pyelonephritis   · History of kidney transplant   · Rule out viral infection   resp viral panel neg   · Immunocompromised patient S/P LP SUGGEST ASEPTIC WITH 100 MONOCYTES    Plan:      acyclovir (ZOVIRAX) 700 mg in dextrose 5 % 100 mL IVPB, Q12H  cefepime (MAXIPIME) 1,000 mg in dextrose 5 % 50 mL extended infusion IVPB, Q12H  predniSONE (DELTASONE) tablet 5 mg, Daily  mycophenolate (CELLCEPT) capsule 1,000 mg, BID     Orders Placed This Encounter   Procedures    Culture, Respiratory    STREP PNEUMONIAE ANTIGEN    Legionella antigen, urine    Culture, CSF    Gram stain CSF    VDRL CSF    Herpes simplex virus PCR  Meningitis Encephalitis Panel CSF, Molecular    Glucose CSF    Protein CSF    CSF cell count with differential    CSF cell count with differential    Oligoclonal banding    Myelin Basic Protein, CSF    Lyme Disease Acute Reflexive Panel    HERPES SIMPLEX VIRUS (HSV) I/II ANTIBODIES IGG & IGM W/ REFLEX    Miscellaneous sendout 1 [place]    Cryptococcal AG Reflex to Titer    T + B Lymphocyte Differential    MISCELLANEOUS SENDOUT 2   ·    CHECK LABS  Imaging and labs were reviewed. Thank you for involving me in the care of Leslie Read. Please do not hesitate to call for any questions or concerns.     Electronically signed by Cynthia Benitez MD on 11/20/2021 at 6:39 PM    Phone [phone]  Fax [phone]

## 2021-11-20 NOTE — PROGRESS NOTES
[address] [place], [place] [address]ist   Progress Note    Admitting Date and Time: 11/17/2021  4:50 AM  Admit Dx: Sepsis due to urinary tract infection ([facility] [A41.9, N39.0]  Urinary tract infection without hematuria, site unspecified [N39.0]  Sepsis, due to unspecified organism, unspecified whether acute organ dysfunction present ([facility] [A41.9]    Subjective:    Patient was lethargic upon examination. She does wake up to answer questions. She complains of pain in her neck. She does have nuchal rigidity. Her breathing became labored last night and she was placed on the Bipap last night after ABGs with pH of 7.275 and PCO2 of 64.8. She remained on the Bipap throughout the night and repeat ABGs this am with pH of 7.273 and PCO2 of 64.8.    ROS: denies  n/v, HA unless stated above.      sodium chloride flush  5-40 mL IntraVENous 2 times per day    Arformoterol Tartrate  15 mcg Nebulization BID    budesonide  500 mcg Nebulization BID    acyclovir  10 mg/kg (Adjusted) IntraVENous Q12H    cefepime (MAXIPIME) IVPB extended  1,000 mg IntraVENous Q12H    allopurinol  100 mg Oral Daily    [Held by provider] cloZAPine  50 mg Oral Nightly    cycloSPORINE modified  100 mg Oral BID    desvenlafaxine succinate  50 mg Oral QPM    docusate sodium  100 mg Oral BID    folic acid  1 mg Oral Daily    levothyroxine  50 mcg Oral Daily    [Held by provider] lisinopril  5 mg Oral Daily    metoprolol succinate  50 mg Oral Daily    predniSONE  5 mg Oral Daily    pregabalin  75 mg Oral BID    atorvastatin  10 mg Oral Daily    [Held by provider] heparin (porcine)  5,000 Units SubCUTAneous 3 times per day    mycophenolate  1,000 mg Oral BID    ipratropium-albuterol  1 ampule Inhalation Q4H WA     sodium chloride flush, 5-40 mL, PRN  sodium chloride, 25 mL, PRN  ondansetron, 4 mg, Q8H PRN   Or  ondansetron, 4 mg, Q6H PRN  acetaminophen, 650 mg, Q6H PRN   Or  acetaminophen, 650 mg, Q6H PRN  polyethylene glycol, 17 g, Daily PRN  perflutren lipid microspheres, 1.5 mL, ONCE PRN         Objective:    BP (!) 115/55   Pulse 83   Temp 98.8 °F (37.1 °C) (Axillary)   Resp 16   Ht 5' 2\" (1.575 m)   Wt 227 lb 3.2 oz (103.1 kg) Comment: bed scale  SpO2 92%   BMI 41.56 kg/m²     General Appearance: lethargic and in no acute distress  Skin: warm and dry  Head: normocephalic and atraumatic  Eyes: pupils equal, round, and reactive to light, conjunctivae normal  Neck: nuchal rigidity   Pulmonary/Chest:  wheezes, crackles, no respiratory distress  Cardiovascular: regular rate, normal S1 and S2   Abdomen: soft, non-tender, non-distended, normal bowel sounds  Extremities: no cyanosis, no clubbing and 1+ bilateral lower extremity edema  Neurologic: no cranial nerve deficit and speech normal    Recent Labs     11/18/21  0543 11/19/21  0703 11/20/21  0928    138 140   K 4.8 5.0 5.3*    101 103   CO2 23 25 25   BUN 30* 44* 51*   CREATININE 2.1* 2.3* 2.1*   GLUCOSE 89 102* 91   CALCIUM 9.6 9.7 9.6       Recent Labs     11/18/21  0543 11/19/21  0703 11/20/21  0928   ALKPHOS 59 60 51   PROT 6.1* 6.4 5.9*   LABALBU 3.2* 3.2* 3.1*   BILITOT 0.8 0.5 0.5   AST 29 44* 34*   ALT 8 14 17       Recent Labs     11/18/21  0543 11/19/21  0703 11/20/21  0928   WBC 12.9* 11.7* 9.0   RBC 4.33 3.97 3.61   HGB 13.8 12.7 11.2*   HCT 45.7 41.4 38.2   .5* 104.3* 105.8*   MCH 31.9 32.0 31.0   MCHC 30.2* 30.7* 29.3*   RDW 14.6 14.6 14.4   PLT 78* 85* 110*   MPV 13.8* 13.8* 14.1*           Radiology:   CT CHEST WO CONTRAST   Final Result   Motion artifact limits evaluation of some images. Allowing for this   limitation, the findings are as follows:      Trace pleural effusions. Mild dependent atelectasis. Cardiomegaly. Question of enlarged ascending aorta, though suboptimally evaluated due to   motion artifact. No evidence of pneumonia. Small hiatal hernia.          XR CHEST (2 VW)   Final Result   New left CP angle opacity suspicious for pleural effusion. Differential   includes summation artifact. No other pulmonary consolidation      Persistent cardiomegaly with slight vascular congestion versus summation   artifact from large body habitus         NM LUNG VENT/PERFUSION (VQ)   Final Result   Low probability for pulmonary embolus. US DUP LOWER EXTREMITIES BILATERAL VENOUS   Final Result   No evidence of DVT in either lower extremity. CT ABDOMEN PELVIS WO CONTRAST Additional Contrast? None   Final Result   Limited evaluation for pyelonephritis given lack of IV contrast.  Allowing   for this limitation, the findings are as follows: The transplant kidney demonstrates perinephric stranding, which can be seen   in the setting of infection/inflammation. No hydronephrosis. Atrophic native kidneys with multiple cysts. Recommend nonemergent renal   ultrasound for further evaluation of an intermediate density structure in the   right kidney. Mildly dilated loops of proximal small bowel are noted, with decompressed   distal small bowel but no discrete transition point. This is favored to   reflect ileus, however obstruction cannot be excluded. Recommend monitoring   of bowel function and follow-up KUBs. Cardiomegaly. US RETROPERITONEAL COMPLETE   Final Result   TRANSPLANT KIDNEY: No evidence of hydronephrosis. Unremarkable echogenicity. NATIVE KIDNEYS: Findings of chronic medical renal disease. No evidence of   hydronephrosis. Small left renal cysts. XR CHEST (2 VW)   Final Result   Mild pulmonary vascular congestion. XR CHEST 1 VIEW   Final Result   Mild cardiomegaly.          IR LUMBAR PUNCTURE FOR DIAGNOSIS    (Results Pending)       Assessment:  Principal Problem:    Sepsis secondary to UTI [facility])  Active Problems:    Depression    Hypertension    Acute kidney injury superimposed on CKD (HCC)    Thyroid disease    Acute on chronic combined systolic (congestive) and diastolic (congestive) heart failure (HCC)  Resolved Problems:    * No resolved hospital problems. *      Plan:  1. Sepsis secondary to Pyelonephritis/UTI:  WBC 13.5, Temp 101.7, heart rate 107, Urinalysis was positive for a UTI and blood cultures are positive for Gram positive cocci in clusters and Gram negative rods. Urine culture growing Klebsiella pneuomoniae. CT of the abd/pelvis showed perinephric stranding in transplant kidney. ID following. Continue Cefepime. 2. Acute respiratory failure with hypoxia and hypercapnia:  She was requiring supplemental oxygen at 2 liters and is on room air at home. ABGs with pH 7.275->7.273 after wearing Bipap throughout the night and PCO2 64.8->64.8. Continue Bipap. Consult Pulmonology. Add aerosols. .   3. Nuchal rigidity: Concern for meningitis. Spoke with ID. Started Acyclovir. IR consulted for Lumbar puncture. Lumbar puncture panel and meningeal encephalitis panels ordered. 4. Acute on chronic systolic and diastolic heart failure:  Pro-BNP greater than 2000, Last Echo on 12/21/19 revealed an EF of 87-01%, stage I diastolic dysfunction. Chest xray with pulmonary vascular congestion. Repeat Echo ordered and will done at the bedside later tonight. She received Lasix 20 mg IV x 1 on 11/17/21.    5. Bacteremia:  Blood cultures positive for Klebsiella pneumoniae and Staphylococcus coagulase-negative. ID following. Continue cefepime. Vanco x 1 on 11/19. 6. Acute on chronic kidney disease in a patient with a history of kidney transplan:  Kidney transplant in 2015. Creatinine was 1.7 on admission, trended up to 2.3 and down to 2.1. Nephrology following. Lisinopril on hold. 7. Bilateral lower extremity edema:  D dimer was 836. Ultrasound of the lower extremities negative for DVT. Unable to do a CTA of the chest due to elevated creatinine. VQ scan showed low probability for PE. 8. Depression: Continue home medications.    9. Elevated troponin level: Troponin levels: 52->88->56. Cardiology was consulted and likely acute myocardial injury secondary to sepsis, bacteremia and acute renal failure. Outpatient ischemic evaluation. 10. Hyperparathyroidism and parathyroidectomy 2006:  TSH 0.376 and free T4 1.08.   11. Hyperkalemia:  Potassium is 5.3. Nephrology to be notified. NOTE: This report was transcribed using voice recognition software. Every effort was made to ensure accuracy; however, inadvertent computerized transcription errors may be present.      Electronically signed by KATINA Fisher CNP on 11/20/2021 at 12:13 PM

## 2021-11-21 LAB
ALBUMIN SERPL-MCNC: 2.8 G/DL (ref 3.5–5.2)
ALP BLD-CCNC: 49 U/L (ref 35–104)
ALT SERPL-CCNC: 14 U/L (ref 0–32)
ANION GAP SERPL CALCULATED.3IONS-SCNC: 12 MMOL/L (ref 7–16)
AST SERPL-CCNC: 27 U/L (ref 0–31)
BILIRUB SERPL-MCNC: 0.5 MG/DL (ref 0–1.2)
BUN BLDV-MCNC: 48 MG/DL (ref 6–23)
CALCIUM SERPL-MCNC: 9.9 MG/DL (ref 8.6–10.2)
CHLORIDE BLD-SCNC: 107 MMOL/L (ref 98–107)
CO2: 22 MMOL/L (ref 22–29)
CREAT SERPL-MCNC: 1.3 MG/DL (ref 0.5–1)
GFR AFRICAN AMERICAN: 50
GFR NON-AFRICAN AMERICAN: 41 ML/MIN/1.73
GLUCOSE BLD-MCNC: 93 MG/DL (ref 74–99)
GRAM STAIN ORDERABLE: NORMAL
HCT VFR BLD CALC: 38.6 % (ref 34–48)
HEMOGLOBIN: 11.3 G/DL (ref 11.5–15.5)
LV EF: 58 %
LVEF MODALITY: NORMAL
MAGNESIUM: 2.3 MG/DL (ref 1.6–2.6)
MCH RBC QN AUTO: 31.1 PG (ref 26–35)
MCHC RBC AUTO-ENTMCNC: 29.3 % (ref 32–34.5)
MCV RBC AUTO: 106.3 FL (ref 80–99.9)
METER GLUCOSE: 164 MG/DL (ref 74–99)
PDW BLD-RTO: 14.2 FL (ref 11.5–15)
PHOSPHORUS: 2.2 MG/DL (ref 2.5–4.5)
PLATELET # BLD: 126 E9/L (ref 130–450)
PMV BLD AUTO: 13.8 FL (ref 7–12)
POTASSIUM SERPL-SCNC: 5.4 MMOL/L (ref 3.5–5)
RBC # BLD: 3.63 E12/L (ref 3.5–5.5)
SODIUM BLD-SCNC: 141 MMOL/L (ref 132–146)
TOTAL PROTEIN: 5.9 G/DL (ref 6.4–8.3)
WBC # BLD: 8.5 E9/L (ref 4.5–11.5)

## 2021-11-21 PROCEDURE — 97530 THERAPEUTIC ACTIVITIES: CPT | Performed by: OCCUPATIONAL THERAPIST

## 2021-11-21 PROCEDURE — 97165 OT EVAL LOW COMPLEX 30 MIN: CPT | Performed by: OCCUPATIONAL THERAPIST

## 2021-11-21 PROCEDURE — 6360000002 HC RX W HCPCS: Performed by: NURSE PRACTITIONER

## 2021-11-21 PROCEDURE — 80053 COMPREHEN METABOLIC PANEL: CPT

## 2021-11-21 PROCEDURE — 87449 NOS EACH ORGANISM AG IA: CPT

## 2021-11-21 PROCEDURE — 36415 COLL VENOUS BLD VENIPUNCTURE: CPT

## 2021-11-21 PROCEDURE — 86403 PARTICLE AGGLUT ANTBDY SCRN: CPT

## 2021-11-21 PROCEDURE — 94640 AIRWAY INHALATION TREATMENT: CPT

## 2021-11-21 PROCEDURE — 86359 T CELLS TOTAL COUNT: CPT

## 2021-11-21 PROCEDURE — 2580000003 HC RX 258: Performed by: SPECIALIST

## 2021-11-21 PROCEDURE — 82962 GLUCOSE BLOOD TEST: CPT

## 2021-11-21 PROCEDURE — 86788 WEST NILE VIRUS AB IGM: CPT

## 2021-11-21 PROCEDURE — 6370000000 HC RX 637 (ALT 250 FOR IP): Performed by: NURSE PRACTITIONER

## 2021-11-21 PROCEDURE — 93306 TTE W/DOPPLER COMPLETE: CPT

## 2021-11-21 PROCEDURE — 6360000002 HC RX W HCPCS: Performed by: SPECIALIST

## 2021-11-21 PROCEDURE — 83735 ASSAY OF MAGNESIUM: CPT

## 2021-11-21 PROCEDURE — 86360 T CELL ABSOLUTE COUNT/RATIO: CPT

## 2021-11-21 PROCEDURE — 6370000000 HC RX 637 (ALT 250 FOR IP): Performed by: INTERNAL MEDICINE

## 2021-11-21 PROCEDURE — 1200000000 HC SEMI PRIVATE

## 2021-11-21 PROCEDURE — 6360000004 HC RX CONTRAST MEDICATION: Performed by: NURSE PRACTITIONER

## 2021-11-21 PROCEDURE — 84100 ASSAY OF PHOSPHORUS: CPT

## 2021-11-21 PROCEDURE — 85027 COMPLETE CBC AUTOMATED: CPT

## 2021-11-21 PROCEDURE — 94660 CPAP INITIATION&MGMT: CPT

## 2021-11-21 PROCEDURE — 2580000003 HC RX 258: Performed by: NURSE PRACTITIONER

## 2021-11-21 PROCEDURE — 2580000003 HC RX 258: Performed by: INTERNAL MEDICINE

## 2021-11-21 RX ORDER — SODIUM CHLORIDE 9 MG/ML
INJECTION, SOLUTION INTRAVENOUS EVERY 8 HOURS
Status: DISCONTINUED | OUTPATIENT
Start: 2021-11-21 | End: 2021-11-27 | Stop reason: HOSPADM

## 2021-11-21 RX ORDER — PREGABALIN 75 MG/1
75 CAPSULE ORAL EVERY EVENING
Status: DISCONTINUED | OUTPATIENT
Start: 2021-11-21 | End: 2021-11-23

## 2021-11-21 RX ADMIN — HEPARIN SODIUM 5000 UNITS: 5000 INJECTION INTRAVENOUS; SUBCUTANEOUS at 13:45

## 2021-11-21 RX ADMIN — DESVENLAFAXINE 50 MG: 50 TABLET, EXTENDED RELEASE ORAL at 17:43

## 2021-11-21 RX ADMIN — HEPARIN SODIUM 5000 UNITS: 5000 INJECTION INTRAVENOUS; SUBCUTANEOUS at 05:52

## 2021-11-21 RX ADMIN — DOCUSATE SODIUM 100 MG: 100 CAPSULE ORAL at 22:05

## 2021-11-21 RX ADMIN — CEFEPIME HYDROCHLORIDE 1000 MG: 1 INJECTION, POWDER, FOR SOLUTION INTRAMUSCULAR; INTRAVENOUS at 01:57

## 2021-11-21 RX ADMIN — PREGABALIN 75 MG: 75 CAPSULE ORAL at 17:43

## 2021-11-21 RX ADMIN — CYCLOSPORINE 100 MG: 25 CAPSULE, LIQUID FILLED ORAL at 22:04

## 2021-11-21 RX ADMIN — IPRATROPIUM BROMIDE AND ALBUTEROL SULFATE 1 AMPULE: .5; 2.5 SOLUTION RESPIRATORY (INHALATION) at 18:15

## 2021-11-21 RX ADMIN — ACYCLOVIR SODIUM 700 MG: 50 INJECTION, SOLUTION INTRAVENOUS at 10:51

## 2021-11-21 RX ADMIN — PIPERACILLIN SODIUM AND TAZOBACTAM SODIUM 3375 MG: 3; .375 INJECTION, POWDER, LYOPHILIZED, FOR SOLUTION INTRAVENOUS at 17:43

## 2021-11-21 RX ADMIN — CEFEPIME HYDROCHLORIDE 1000 MG: 1 INJECTION, POWDER, FOR SOLUTION INTRAMUSCULAR; INTRAVENOUS at 13:45

## 2021-11-21 RX ADMIN — METOPROLOL SUCCINATE 50 MG: 50 TABLET, EXTENDED RELEASE ORAL at 10:13

## 2021-11-21 RX ADMIN — ATORVASTATIN CALCIUM 10 MG: 10 TABLET, FILM COATED ORAL at 10:13

## 2021-11-21 RX ADMIN — CYCLOSPORINE 100 MG: 25 CAPSULE, LIQUID FILLED ORAL at 10:13

## 2021-11-21 RX ADMIN — BUDESONIDE 500 MCG: 0.5 INHALANT RESPIRATORY (INHALATION) at 18:15

## 2021-11-21 RX ADMIN — IPRATROPIUM BROMIDE AND ALBUTEROL SULFATE 1 AMPULE: .5; 2.5 SOLUTION RESPIRATORY (INHALATION) at 14:48

## 2021-11-21 RX ADMIN — HEPARIN SODIUM 5000 UNITS: 5000 INJECTION INTRAVENOUS; SUBCUTANEOUS at 22:15

## 2021-11-21 RX ADMIN — ALLOPURINOL 100 MG: 100 TABLET ORAL at 10:13

## 2021-11-21 RX ADMIN — SODIUM CHLORIDE: 9 INJECTION, SOLUTION INTRAVENOUS at 06:00

## 2021-11-21 RX ADMIN — PERFLUTREN 1.65 MG: 6.52 INJECTION, SUSPENSION INTRAVENOUS at 10:51

## 2021-11-21 RX ADMIN — Medication 10 ML: at 10:19

## 2021-11-21 RX ADMIN — ARFORMOTEROL TARTRATE 15 MCG: 15 SOLUTION RESPIRATORY (INHALATION) at 18:15

## 2021-11-21 RX ADMIN — FOLIC ACID 1 MG: 1 TABLET ORAL at 10:13

## 2021-11-21 RX ADMIN — DOCUSATE SODIUM 100 MG: 100 CAPSULE ORAL at 10:13

## 2021-11-21 RX ADMIN — PREDNISONE 5 MG: 5 TABLET ORAL at 10:13

## 2021-11-21 ASSESSMENT — PAIN SCALES - GENERAL
PAINLEVEL_OUTOF10: 0

## 2021-11-21 ASSESSMENT — PAIN SCALES - WONG BAKER
WONGBAKER_NUMERICALRESPONSE: 0
WONGBAKER_NUMERICALRESPONSE: 0

## 2021-11-21 ASSESSMENT — PAIN DESCRIPTION - PROGRESSION: CLINICAL_PROGRESSION: NOT CHANGED

## 2021-11-21 NOTE — PLAN OF CARE
Problem: Falls - Risk of:  Goal: Will remain free from falls  Description: Will remain free from falls  11/20/2021 2038 by Ivett Jenkins RN  Outcome: Met This Shift     Problem: Falls - Risk of:  Goal: Absence of physical injury  Description: Absence of physical injury  11/20/2021 2038 by Ivett Jenkins RN  Outcome: Met This Shift

## 2021-11-21 NOTE — PROGRESS NOTES
2 d echo completed along with 10cc definity given because iv not working properly given by the rn [id number]

## 2021-11-21 NOTE — CONSULTS
PULMONARY MEDICINE CONSULT    Consult requested by: Balbir Montes CNP  Reason for consult: Hypercapnic respiratory failure    HPI  73year old woman with PMH as described below admitted to hospital for management of     Acute hypoxemic and hypercapnic respiratory failure and metabolic encephalopathy secondary sepsis. --There is evidence of pulmonary hypertension in her CT images  --BPAP 16/5 with O2 to keep sats > 95%  --If her mentation continues to decline, will need intubation  --CT chest is limited but demonstrate bibasilar infiltrates and a round area in RLL suggetive of a nodule, righ pleural effusion, cardiomegaly and enlarged pulmonary arteries. She is receiving antimicrobial therapy. --Continue bronchodilators    Sepsis secondary to Klebsiella bacteremia and pyelonephritis  --Antibiotic regimen as per ID    Acute kidney injury with hyperkalemia secondary to Sepsis/prerenal physiology in a patient with CKD and prior renal transplant. --Avoid nephrotoxins and dose medications according GFR  --Nephrology consulting and managing immunosuppresants    CHF, type 2 AMI  --Management as per cardiology    Hypothyroidism  --Management with thyroid hormone supplements    DVT prophylaxis with   Goals of care: Full code    REVIEW OF SYSTEMS  General: denies weight gain, denies loss of appetite, fever, chills, night sweats. HEENT: denies headaches, dizziness, head trauma, visual changes, eye pain, tinnitus, nosebleeds, hoarseness or throat pain, endorses dry eyes    Respiratory: denies chest pain, dyspnea, cough and hemoptysis  Cardiovascular: denies orthopnea, paroxysmal nocturnal dyspnea, leg swelling, and previous heart attack. Gastrointestinal: denies pain, nausea vomiting, diarrhea, constipation, melena or bleeding.   Genitourinary: denies hematuria, frequency, urgency or dysuria, mild flank pain  Neurology: denies syncope, seizures, paralysis, paraesthesia   Endocrine: denies polyuria, polydipsia, skin or hair changes, and heat or cold intolerance  Musculoskeletal: denies joint pain, swelling, arthritis or myalgia  Hematologic: denies bleeding, adenopathy and easy bruising  Skin: denies rashes and skin discoloration  Psychiatry: denies depression      PHYSICAL EXAM  General appearance: Well developed, not in pain or distress, in no respiratory distress    HEENT: Atraumatic/normocephalic, EOMI, CJ, pharynx clear, moist mucosa, redness of the uvula appreciated, clear/dried discharge of the eyes   Neck: Supple, no jugular venous distension, lymphadenopathy, thyromegaly or carotid bruits  Chest: Equal but dininished breath sounds, no wheezing, no crackles and no tenderness over ribs   Cardiovascular: Normal S1 , S2, regular rate and rhythm, no murmur, rub or gallop  Abdomen: Normal sounds present, soft, lax with no tenderness, no hepatosplenomegaly, and no masses, obese, tenderness over flank area  Extremities: No edema. Pulses are equally present.    Skin: intact, no rashes   Neurologic: Alert and oriented x 2, confused as to place, No focal deficit, awakens easily     Investigations:  Labs, radiological imaging and cardiac work up were reviewed        Past Medical History:   Diagnosis Date    Abnormal EKG     left bundle branch block    Acute gout involving toe of right foot 9/3/2020    Acute gout involving toe of right foot 9/3/2020    Acute on chronic combined systolic (congestive) and diastolic (congestive) heart failure (HCC)     Cancer ([address]     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    Cerebrovascular disease     Clotting disorder ([address] 1985    thrombophlebitis after c section delivery    Depression     15 years followed by dr Blue Nur    Hyperlipidemia     Hypertension     Hyperthyroidism     Leg swelling 9/3/2020    Lymphedema of both lower extremities 9/3/2020    Peripheral vascular disease ([address]     Renal failure 11/2012    renal transplant    Thrombophlebitis     after c section delivery    Thyroid disease      Family History   Problem Relation Age of Onset    Diabetes Mother     Diabetes Father     Dementia Father      Social History     Socioeconomic History    Marital status:      Spouse name: Not on file    Number of children: Not on file    Years of education: Not on file    Highest education level: Not on file   Occupational History    Not on file   Tobacco Use    Smoking status: Former Smoker     Packs/day: 1.00     Years: 20.00     Pack years: 20.00     Quit date: 10/1/2015     Years since quittin.1    Smokeless tobacco: Never Used   Vaping Use    Vaping Use: Never used   Substance and Sexual Activity    Alcohol use: No     Comment: 2 cups coffee per day    Drug use: No    Sexual activity: Not on file   Other Topics Concern    Not on file   Social History Narrative    Not on file     Social Determinants of Health     Financial Resource Strain:     Difficulty of Paying Living Expenses: Not on file   Food Insecurity:     Worried About [address] in the Last Year: Not on file    [address] in the Last Year: Not on file   Transportation Needs:     Lack of Transportation (Medical): Not on file    Lack of Transportation (Non-Medical):  Not on file   Physical Activity:     Days of Exercise per Week: Not on file    Minutes of Exercise per Session: Not on file   Stress:     Feeling of Stress : Not on file   Social Connections:     Frequency of Communication with Friends and Family: Not on file    Frequency of Social Gatherings with Friends and Family: Not on file    Attends Orthodoxy Services: Not on file    Active Member of Clubs or Organizations: Not on file    Attends Club or Organization Meetings: Not on file    Marital Status: Not on file   Intimate Partner Violence:     Fear of Current or Ex-Partner: Not on file    Emotionally Abused: Not on file    Physically Abused: Not on file    Sexually Abused: Not on file   [place] mg at 11/19/21 1826    docusate sodium (COLACE) capsule 100 mg  100 mg Oral BID Marcella Gordon, APRN - CNP   100 mg at 23/17/67 5308    folic acid (FOLVITE) tablet 1 mg  1 mg Oral Daily Marcella Gordon, APRN - CNP   1 mg at 11/19/21 1050    levothyroxine (SYNTHROID) tablet 50 mcg  50 mcg Oral Daily Marcella Gordon, APRN - CNP   50 mcg at 11/20/21 0549    [Held by provider] lisinopril (PRINIVIL;ZESTRIL) tablet 5 mg  5 mg Oral Daily Marcella Gordon, APRN - CNP   5 mg at 11/17/21 1430    metoprolol succinate (TOPROL XL) extended release tablet 50 mg  50 mg Oral Daily Marcella Gordon, APRN - CNP   50 mg at 11/19/21 1050    predniSONE (DELTASONE) tablet 5 mg  5 mg Oral Daily Marcella Gordon, APRN - CNP   5 mg at 11/19/21 1050    pregabalin (LYRICA) capsule 75 mg  75 mg Oral BID Marcella Gordon, APRN - CNP   75 mg at 11/19/21 2213    atorvastatin (LIPITOR) tablet 10 mg  10 mg Oral Daily Marcella Gordon, APRN - CNP   10 mg at 11/19/21 1050    ondansetron (ZOFRAN-ODT) disintegrating tablet 4 mg  4 mg Oral Q8H PRN Marcella Gordon, APRN - CNP        Or    ondansetron (ZOFRAN) injection 4 mg  4 mg IntraVENous Q6H PRN Marcella Gordon, APRN - CNP        acetaminophen (TYLENOL) tablet 650 mg  650 mg Oral Q6H PRN Marcella Gordon, APRN - CNP   650 mg at 11/17/21 2005    Or    acetaminophen (TYLENOL) suppository 650 mg  650 mg Rectal Q6H PRN Marcella Gordon, APRN - CNP        polyethylene glycol (GLYCOLAX) packet 17 g  17 g Oral Daily PRN Marcella Gordon, APRN - CNP        heparin (porcine) injection 5,000 Units  5,000 Units SubCUTAneous 3 times per day Marcella Gordon, APRN - CNP   5,000 Units at 11/20/21 0549    perflutren lipid microspheres (DEFINITY) injection 1.65 mg  1.5 mL IntraVENous ONCE PRN Marcella Gordon, APRN - CNP        mycophenolate (CELLCEPT) capsule 1,000 mg  1,000 mg Oral BID KATINA Barrios CNP   1,000 mg at 11/19/21 2501    ipratropium-albuterol (DUONEB) nebulizer solution 1 ampule  1 Final     MCV   Date Value Ref Range Status   11/20/2021 105.8 (H) 80.0 - 99.9 fL Final   11/19/2021 104.3 (H) 80.0 - 99.9 fL Final   11/18/2021 105.5 (H) 80.0 - 99.9 fL Final     Platelets   Date Value Ref Range Status   11/20/2021 110 (L) 130 - 450 E9/L Final   11/19/2021 85 (L) 130 - 450 E9/L Final   11/18/2021 78 (L) 130 - 450 E9/L Final     Sodium   Date Value Ref Range Status   11/20/2021 137 132 - 146 mmol/L Final   11/20/2021 140 132 - 146 mmol/L Final   11/19/2021 138 132 - 146 mmol/L Final     Potassium   Date Value Ref Range Status   11/20/2021 7.2 (HH) 3.5 - 5.0 mmol/L Final   11/20/2021 6.1 (H) 3.5 - 5.0 mmol/L Final     Comment:     Specimen is moderately Hemolyzed. Result may be artificially increased. 11/20/2021 5.3 (H) 3.5 - 5.0 mmol/L Final     Potassium reflex Magnesium   Date Value Ref Range Status   12/17/2019 5.2 (H) 3.5 - 5.0 mmol/L Final     Comment:     Specimen is moderately Hemolyzed. Result may be artificially increased.      Chloride   Date Value Ref Range Status   11/20/2021 104 98 - 107 mmol/L Final   11/20/2021 103 98 - 107 mmol/L Final   11/19/2021 101 98 - 107 mmol/L Final     CO2   Date Value Ref Range Status   11/20/2021 25 22 - 29 mmol/L Final   11/20/2021 25 22 - 29 mmol/L Final   11/19/2021 25 22 - 29 mmol/L Final     BUN   Date Value Ref Range Status   11/20/2021 54 (H) 6 - 23 mg/dL Final   11/20/2021 51 (H) 6 - 23 mg/dL Final   11/19/2021 44 (H) 6 - 23 mg/dL Final     CREATININE   Date Value Ref Range Status   11/20/2021 2.1 (H) 0.5 - 1.0 mg/dL Final   11/20/2021 2.1 (H) 0.5 - 1.0 mg/dL Final   11/19/2021 2.3 (H) 0.5 - 1.0 mg/dL Final     Glucose   Date Value Ref Range Status   11/20/2021 103 (H) 74 - 99 mg/dL Final   11/20/2021 91 74 - 99 mg/dL Final   11/19/2021 102 (H) 74 - 99 mg/dL Final     Calcium   Date Value Ref Range Status   11/20/2021 9.4 8.6 - 10.2 mg/dL Final   11/20/2021 9.6 8.6 - 10.2 mg/dL Final   11/19/2021 9.7 8.6 - 10.2 mg/dL Final     Total Protein   Date Value Ref Range Status   11/20/2021 5.9 (L) 6.4 - 8.3 g/dL Final   11/19/2021 6.4 6.4 - 8.3 g/dL Final   11/18/2021 6.1 (L) 6.4 - 8.3 g/dL Final     Albumin   Date Value Ref Range Status   11/20/2021 3.1 (L) 3.5 - 5.2 g/dL Final   11/19/2021 3.2 (L) 3.5 - 5.2 g/dL Final   11/18/2021 3.2 (L) 3.5 - 5.2 g/dL Final     Total Bilirubin   Date Value Ref Range Status   11/20/2021 0.5 0.0 - 1.2 mg/dL Final   11/19/2021 0.5 0.0 - 1.2 mg/dL Final   11/18/2021 0.8 0.0 - 1.2 mg/dL Final     Alkaline Phosphatase   Date Value Ref Range Status   11/20/2021 51 35 - 104 U/L Final   11/19/2021 60 35 - 104 U/L Final   11/18/2021 59 35 - 104 U/L Final     AST   Date Value Ref Range Status   11/20/2021 34 (H) 0 - 31 U/L Final   11/19/2021 44 (H) 0 - 31 U/L Final   11/18/2021 29 0 - 31 U/L Final     ALT   Date Value Ref Range Status   11/20/2021 17 0 - 32 U/L Final   11/19/2021 14 0 - 32 U/L Final   11/18/2021 8 0 - 32 U/L Final     GFR Non-   Date Value Ref Range Status   11/20/2021 24 >=60 mL/min/1.73 Final     Comment:     Chronic Kidney Disease: less than 60 ml/min/1.73 sq.m. Kidney Failure: less than 15 ml/min/1.73 sq.m. Results valid for patients 18 years and older. 11/20/2021 24 >=60 mL/min/1.73 Final     Comment:     Chronic Kidney Disease: less than 60 ml/min/1.73 sq.m. Kidney Failure: less than 15 ml/min/1.73 sq.m. Results valid for patients 18 years and older. 11/19/2021 21 >=60 mL/min/1.73 Final     Comment:     Chronic Kidney Disease: less than 60 ml/min/1.73 sq.m. Kidney Failure: less than 15 ml/min/1.73 sq.m. Results valid for patients 18 years and older.        GFR    Date Value Ref Range Status   11/20/2021 29  Final   11/20/2021 29  Final   11/19/2021 26  Final     Magnesium   Date Value Ref Range Status   11/20/2021 2.4 1.6 - 2.6 mg/dL Final   11/19/2021 2.4 1.6 - 2.6 mg/dL Final   11/18/2021 1.6 1.6 - 2.6 mg/dL Final     Phosphorus   Date Value Ref Range Status   11/20/2021 3.8 2.5 - 4.5 mg/dL Final   11/19/2021 4.4 2.5 - 4.5 mg/dL Final   11/18/2021 4.9 (H) 2.5 - 4.5 mg/dL Final     Recent Labs     11/20/21  0847   PH 7.273*   PO2 70.0*   PCO2 64.8*   HCO3 29.3*   BE 1.1   O2SAT 92.5       RADIOLOGY:  CT CHEST WO CONTRAST   Final Result   Motion artifact limits evaluation of some images. Allowing for this   limitation, the findings are as follows:      Trace pleural effusions. Mild dependent atelectasis. Cardiomegaly. Question of enlarged ascending aorta, though suboptimally evaluated due to   motion artifact. No evidence of pneumonia. Small hiatal hernia. XR CHEST (2 VW)   Final Result   New left CP angle opacity suspicious for pleural effusion. Differential   includes summation artifact. No other pulmonary consolidation      Persistent cardiomegaly with slight vascular congestion versus summation   artifact from large body habitus         NM LUNG VENT/PERFUSION (VQ)   Final Result   Low probability for pulmonary embolus. US DUP LOWER EXTREMITIES BILATERAL VENOUS   Final Result   No evidence of DVT in either lower extremity. CT ABDOMEN PELVIS WO CONTRAST Additional Contrast? None   Final Result   Limited evaluation for pyelonephritis given lack of IV contrast.  Allowing   for this limitation, the findings are as follows: The transplant kidney demonstrates perinephric stranding, which can be seen   in the setting of infection/inflammation. No hydronephrosis. Atrophic native kidneys with multiple cysts. Recommend nonemergent renal   ultrasound for further evaluation of an intermediate density structure in the   right kidney. Mildly dilated loops of proximal small bowel are noted, with decompressed   distal small bowel but no discrete transition point. This is favored to   reflect ileus, however obstruction cannot be excluded. Recommend monitoring   of bowel function and follow-up KUBs. Cardiomegaly. US RETROPERITONEAL COMPLETE   Final Result   TRANSPLANT KIDNEY: No evidence of hydronephrosis. Unremarkable echogenicity. NATIVE KIDNEYS: Findings of chronic medical renal disease. No evidence of   hydronephrosis. Small left renal cysts. XR CHEST (2 VW)   Final Result   Mild pulmonary vascular congestion. XR CHEST 1 VIEW   Final Result   Mild cardiomegaly. IR LUMBAR PUNCTURE FOR DIAGNOSIS    (Results Pending)     PROBLEM LIST:  Principal Problem:    Sepsis secondary to UTI [facility])  Active Problems:    Depression    Hypertension    Acute kidney injury superimposed on CKD (HCC)    Thyroid disease    Acute on chronic combined systolic (congestive) and diastolic (congestive) heart failure (HCC)  Resolved Problems:    * No resolved hospital problems.  *        Mimi Urrutia MD  Pulmonary and Critical Care Medicine

## 2021-11-21 NOTE — PROGRESS NOTES
[address] [facility]  [facility]         Date:2021                                                   Patient Name: Monik Burgess     MRN: [id number]     : 1955     Room: [address] [place] [address]       Evaluating OT: Sonia Badillo, OTR/L; [id number]       Referring Provider and Orders/Date:    OT eval and treat  Start:  21 1200,   End:  21 1200,   ONE TIME,   Standing Count:  1 Occurrences,   R         Marcella Read, APRN - CNP            Diagnosis:   1. Sepsis, due to unspecified organism, unspecified whether acute organ dysfunction present ([facility] [address]    2.  Urinary tract infection without hematuria, site unspecified         Pertinent Medical History:        Past Medical History:   Diagnosis Date    Abnormal EKG     left bundle branch block    Acute gout involving toe of right foot 9/3/2020    Acute gout involving toe of right foot 9/3/2020    Acute on chronic combined systolic (congestive) and diastolic (congestive) heart failure (HCC)     Cancer (HCC)     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    Cerebrovascular disease     Clotting disorder ([address] 1985    thrombophlebitis after c section delivery    Depression     15 years followed by dr Carissa Raza Hyperlipidemia     Hypertension     Hyperthyroidism     Leg swelling 9/3/2020    Lymphedema of both lower extremities 9/3/2020    Peripheral vascular disease ([address]     Renal failure 2012    renal transplant    Thrombophlebitis     after c section delivery    Thyroid disease           Past Surgical History:   Procedure Laterality Date   [address]    one normal delivery    COLONOSCOPY      DIAGNOSTIC CARDIAC CATH LAB PROCEDURE      normal coronaries and  normal coronaries    DIALYSIS FISTULA CREATION  march and 2012    phase one and two patient will start dialysis when needed    FEMORAL BYPASS  1988    transfemoral venous bypass grafts    KIDNEY TRANSPLANT  2016    KIDNEY TRANSPLANT  2015    KIDNEY TRANSPLANT  2015    PARATHYROIDECTOMY  2006    left parathyroid  implant and parathyroidectomy       Precautions:  Fall Risk, 2L, confusion    Recommended placement: subacute    Assessment of current deficits     [x] Functional mobility  [x]ADLs  [x] Strength               [x]Cognition     [x] Functional transfers   [x] IADLs         [x] Safety Awareness   [x]Endurance     [] Fine Coordination              [x] Balance      [] Vision/perception   []Sensation      [x]Gross Motor Coordination  [] ROM  [] Delirium                   [] Motor Control     OT PLAN OF CARE   OT POC based on physician orders, patient diagnosis and results of clinical assessment    Frequency/Duration 1-3 days/wk for 2 weeks PRN   Specific OT Treatment Interventions to include:   * Instruction/training on adapted ADL techniques and AE recommendations to increase functional independence within precautions       * Training on energy conservation strategies, correct breathing pattern and techniques to improve independence/tolerance for self-care routine  * Functional transfer/mobility training/DME recommendations for increased independence, safety, and fall prevention  * Patient/Family education to increase follow through with safety techniques and functional independence  * Recommendation of environmental modifications for increased safety with functional transfers/mobility and ADLs  * Cognitive retraining/development of therapeutic activities to improve problem solving, judgement, memory, and attention for increased safety/participation in ADL/IADL tasks  * Therapeutic exercise to improve motor endurance, ROM, and functional strength for ADLs/functional transfers  * Therapeutic activities to facilitate/challenge dynamic balance, stand tolerance for increased safety and independence with ADLs  * Therapeutic activities to facilitate gross/fine motor skills for increased independence with ADLs  * Neuro-muscular re-education: facilitation of righting/equilibrium reactions, midline orientation, scapular stability/mobility, normalization of muscle tone, and facilitation of volitional active controled movement  * Positioning to improve skin integrity, interaction with environment and functional independence     Recommended Adaptive Equipment/DME:  TBD      Home Living: Pt lives alone in a apartment 9th, elevator access  with No steps  to enter. Laundry on the 2nd floor with elevator access. Pt cares for mother as well that lives in her own home, but there is a lift to enter. Reports that she has family and friends to assist if needed. Bathroom setup: tub shower with grab bars, shower chair, standard toilet    DME owned: rollator     Prior Level of Function: indep with ADLs , indep with IADLs; ambulated indep    Driving: yes   Occupation: retired   Enjoys: [employer], casino with mother    Pain Level: 4/10 chronic back pain; nursing present during report  Cognition: A&O: 3/4 reported month as \"[place]\" and perseverating on this;  Follows 1-2 step directions   Memory:  Fair-   Sequencing:  Fair-   Problem solving:  Poor+   Judgement/safety:  Poor+    AM-PAC Daily Activity Inpatient   How much help for putting on and taking off regular lower body clothing?: Total  How much help for Bathing?: A Lot  How much help for Toileting?: Total  How much help for putting on and taking off regular upper body clothing?: Total  How much help for taking care of personal grooming?: A Lot  How much help for eating meals?: A Little  AM-[facility] Inpatient Daily Activity Raw Score: 10  AM-PAC Inpatient ADL T-Scale Score : 27.31  ADL Inpatient CMS 0-100% Score: 74.7  ADL Inpatient CMS G-Code Modifier : CL     Functional Assessment:     Initial Eval Status  Date: 11/21/2021   Treatment Status  Date: STGs = LTGs  Time frame: 10-14 days   Feeding Minimal Assist physically to bring drinks to mouth to prevent spillage. Set up   Grooming Min Assist with oral care, face/hand wash and dep assist with hair wash and comb due to fatigue. Stand by Assist    UB Dressing Dependent with gown management from supine  Moderate Assist    LB Dressing Dependent to don yair socks from supine  Maximal Assist    Bathing Maximal Assist suspected with pt declining to complete on eval due to fatigue. Pt simulated ability to wash arms and chest.   Minimal Assist    Toileting Dependent with hassan in place and need for bed pan  Maximal Assist    Bed Mobility  Supine to sit: Maximal Assist   Sit to supine: Maximal Assist  And moderate assist for rolling. Limited with confusion and fatigue at time of eval. Pt reported going for a test prior to therapist arrival that \"wiped me out\"   Supine to sit: Minimal Assist   Sit to supine: Minimal Assist    Functional Transfers Moderate Assist with sit to stand with walker to attempt side stepping. Not safe at this time and pt sat with  Hips higher in bed instead. Minimal Assist    Functional Mobility  NT due to pt overall debility, decreased activity tolerance, balance deficits, safety and fall risk. Moderate Assist    Balance Sitting:     Static:  Fair-    Dynamic:fair-  Standing: poor+  Sitting:     Static:  fair    Dynamic:fair  Standing: fair-   Activity Tolerance Vitals with activity: 2 L with 93-95% throughout the eval/ADLs  Sitting tolerance EOB limited to <5min; standing <20secs  Increase standing tolerance for >2min with stable vital signs for carry over into toileting, functional tranfers and indep in ADLs   Visual/  Perceptual Glasses: not Present; WFL    Reports change in vision since admission: No     NA   Yair UE Strengthening  4-/5 generally  4+/5MMT generally for carry over into self care, functional transfers and functional mobility with AD.       Hand Dominance  [x] Right  [] Left    AROM (PROM) Strength Additional Info:    FLORENCIA MANZANO/[facility] 4-/5 good  and fair FMC/dexterity noted during ADL tasks  Opposition [x] Intact [] Impaired  Finger to nose [x] Intact [] Impaired     LUE WFL 4-/5 good  and FMC/dexterity noted during ADL tasks  Opposition [x] Intact [] Impaired  Finger to nose [x] Intact [] Impaired     Hearing: WFL   Sensation:  No c/o numbness or tingling   Tone: WFL   Edema: none    Comments: Upon arrival patient supine in bed with nursing and friend present. Friend comfirmed and corrected home set up with pt a poor historian and aphasic throughout. Pt required dep A for most UB ADLs and depA LB ADLs tasks. Limited with mod A for standing during LB ADLs and functional transfers. The biggest barriers reflect that of functional transfers, functional mobility, UB/LB ADLs, cognition, activity tolerance, balance, safety and strengthening. At end of session, patient supine with call light and phone within reach, all lines and tubes intact. Overall patient demonstrated decreased independence and safety during completion of ADL/functional transfer/mobility tasks. Nursing updated on pt position and status following OT eval. Pt would benefit from continued skilled OT to increase safety and independence with completion of ADL/IADL tasks for functional independence and quality of life. Treatment: OT treatment provided this date includes:   Instruction, education and training on safe facilitation and adapted techniques for completion of ADLs. These include neuromuscular reeducation to facilitate balance/righting reactions, safe functional transfer techniques and on energy conservation/work simplification for completion of ADLs. Education provided on hand/feet placement with bed rails, walker and body mechanics for fall prevention. Cues for energy conservation and safety for in the home at [place], including modifications and DME.  Extended time to complete all tasks, including skilled monitoring of patient's response during treatment session and vital signs. Prior to and at the end of session, environmental modifications / line management completed for patients safety and efficiency of treatment session. See above for further details. Rehab Potential: Good  for established goals     Patient / Family Goal: Pain and O2 management. Increase activity tolerance and safety      Patient and/or family were instructed on functional diagnosis, prognosis/goals and OT plan of care. Demonstrated good understanding. Eval Complexity: Low  · History: Brief review of medical records and additional review of physical, cognitive, or psychosocial history related to current functional performance  · Exam: 3+ performance deficits  · Assistance/Modification: Mod assistance or modifications required to perform tasks. May have comorbidities that affect occupational performance. Time In: 1011  Time Out: 1047  Total Treatment Time: 16    Min Units   OT Eval Low 97165  x  1   OT Eval Medium 05677      OT Eval High 25664      OT Re-Eval [id number]       Therapeutic Ex 10364       Therapeutic Activities 32125  16 1    ADL/Self Care 03255       Orthotic Management 31879       Manual [id number]     Neuro Re-Ed 05520       Non-Billable Time          Evaluation Time additionally includes thorough review of current medical information, gathering information on past medical history/social history and prior level of function, interpretation of standardized testing/informal observation of tasks, assessment of data and development of plan of care and goals.             Nile Zapien OTR/L; [id number]

## 2021-11-21 NOTE — PROGRESS NOTES
[facility]     [address] [place] [address] [facility], [address]  Phone [phone]   [phone]      Admit Date: 2021  4:50 AM  Pt Name: Godwin Ross  MRN: [id number]  : 1955  Reason for Consult:    Chief Complaint   Patient presents with    Urinary Tract Infection     Requesting Physician:  Meggan Marr MD  PCP: Meggan Marr MD  History Obtained From:  patient, chart   ID consulted for Sepsis due to urinary tract infection ([address] [A41.9, N39.0]  Urinary tract infection without hematuria, site unspecified [N39.0]  Sepsis, due to unspecified organism, unspecified whether acute organ dysfunction present ([address] [A41.9]  on hospital day 4   Face to face encounter   CHIEF COMPLAINT       Chief Complaint   Patient presents with    Urinary Tract Infection     HISTORYOF PRESENT ILLNESS   Godwin Ross is a 73 y.o. female who presents with   has a past medical history of Abnormal EKG, Acute gout involving toe of right foot, Acute gout involving toe of right foot, Acute on chronic combined systolic (congestive) and diastolic (congestive) heart failure ([address] Cancer ([address] Cerebrovascular disease, Clotting disorder ([address] Depression, Hyperlipidemia, Hypertension, Hyperthyroidism, Leg swelling, Lymphedema of both lower extremities, Peripheral vascular disease ([address] Renal failure, Thrombophlebitis, and Thyroid disease. Urinary Tract Infection        Patient admitted to hospital from home. She was weak, reports dysuria, and generalized weakness. She has been having fever and chills at home. She was hypoxic at home- 88 %. She has a cough. She was admitted for further evaluation. She is known to ID service- she was last seen in 2019-for a klebsiella UTI- was discharge with cefepime due to tachycardia and prolonged QT with cipro.      She is currently on IV cefepime  Her temp has been as high as 101.7   She has a cough - and is tachypnic     ID has been asked to evaluate and manage atbs  Current visit  Dos  11/21/21   PT IN BED   S/P LP  STILL CONFUSED BUT MORE AWAKE  AFEBRILE 2L  CR1.3 EBC8.5  REVIEW OF SYSTEMS     CONSTITUTIONAL:   AS IN HPI   CURRENT MEDICATIONS     Current Facility-Administered Medications:     pregabalin (LYRICA) capsule 75 mg, 75 mg, Oral, QPM, Susannah Ramirez MD    sodium chloride flush 0.9 % injection 5-40 mL, 5-40 mL, IntraVENous, 2 times per day, Cassidy Luoctavio, APRN - CNP, 10 mL at 11/21/21 1019    sodium chloride flush 0.9 % injection 5-40 mL, 5-40 mL, IntraVENous, PRN, Cassidy Lulas, APRN - CNP    0.9 % sodium chloride infusion, 25 mL, IntraVENous, PRN, Cassidy Lulas, APRN - CNP    Arformoterol Tartrate (BROVANA) nebulizer solution 15 mcg, 15 mcg, Nebulization, BID, Cassidy Luoctavio, APRN - CNP, 15 mcg at 11/20/21 1838    budesonide (PULMICORT) nebulizer suspension 500 mcg, 500 mcg, Nebulization, BID, Cassidy Lulas, APRN - CNP, 500 mcg at 11/20/21 1839    acyclovir (ZOVIRAX) 700 mg in dextrose 5 % 100 mL IVPB, 10 mg/kg (Adjusted), IntraVENous, Q12H, Cassidy Virginia APRN - CNP, Stopped at 11/21/21 1204    0.9 % sodium chloride infusion, , IntraVENous, Continuous, Xochitl Queen MD, Last Rate: 35 mL/hr at 11/21/21 1312, Rate Change at 11/21/21 1312    cefepime (MAXIPIME) 1,000 mg in dextrose 5 % 50 mL extended infusion IVPB, 1,000 mg, IntraVENous, Q12H, Last Rate: 12.5 mL/hr at 11/21/21 1345, 1,000 mg at 11/21/21 1345 **AND** 0.9 % sodium chloride infusion, 25 mL, IntraVENous, Q12H, Cassidy Coleman APRN - CNP, Last Rate: 12.5 mL/hr at 11/20/21 1935, 25 mL at 11/20/21 1935    allopurinol (ZYLOPRIM) tablet 100 mg, 100 mg, Oral, Daily, KATINA Brand CNP, 100 mg at 11/21/21 1013    [Held by provider] cloZAPine (CLOZARIL) tablet 50 mg, 50 mg, Oral, Nightly, KATINA Brand CNP, 50 mg at 11/19/21 2213    cycloSPORINE modified (NEORAL) capsule 100 mg, 100 mg, Oral, BID, BODØ Macarena Johnson, APRN - CNP, 100 mg at 11/21/21 1013    desvenlafaxine succinate (PRISTIQ) extended release tablet 50 mg, 50 mg, Oral, QPM, [place] Heater, APRN - CNP, 50 mg at 11/19/21 1826    docusate sodium (COLACE) capsule 100 mg, 100 mg, Oral, BID, Lorena Heater, APRN - CNP, 100 mg at 01/53/38 0656    folic acid (FOLVITE) tablet 1 mg, 1 mg, Oral, Daily, Lorena Heater, APRN - CNP, 1 mg at 11/21/21 1013    levothyroxine (SYNTHROID) tablet 50 mcg, 50 mcg, Oral, Daily, Lorena Heater, APRN - CNP, 50 mcg at 11/20/21 0549    [Held by provider] lisinopril (PRINIVIL;ZESTRIL) tablet 5 mg, 5 mg, Oral, Daily, Lorena Heater, APRN - CNP, 5 mg at 11/17/21 1430    metoprolol succinate (TOPROL XL) extended release tablet 50 mg, 50 mg, Oral, Daily, Lorena Heater, APRN - CNP, 50 mg at 11/21/21 1013    predniSONE (DELTASONE) tablet 5 mg, 5 mg, Oral, Daily, [place] Heater, APRN - CNP, 5 mg at 11/21/21 1013    atorvastatin (LIPITOR) tablet 10 mg, 10 mg, Oral, Daily, Lorena Heater, APRN - CNP, 10 mg at 11/21/21 1013    ondansetron (ZOFRAN-ODT) disintegrating tablet 4 mg, 4 mg, Oral, Q8H PRN **OR** ondansetron (ZOFRAN) injection 4 mg, 4 mg, IntraVENous, Q6H PRN, Lorena Heater, APRN - CNP    acetaminophen (TYLENOL) tablet 650 mg, 650 mg, Oral, Q6H PRN, 650 mg at 11/17/21 2005 **OR** acetaminophen (TYLENOL) suppository 650 mg, 650 mg, Rectal, Q6H PRN, Lorena Heater, APRN - CNP    polyethylene glycol (GLYCOLAX) packet 17 g, 17 g, Oral, Daily PRN, [place] Heater, APRN - CNP    heparin (porcine) injection 5,000 Units, 5,000 Units, SubCUTAneous, 3 times per day, Lorena Heater, APRN - CNP, 5,000 Units at 11/21/21 1345    [Held by provider] mycophenolate (CELLCEPT) capsule 1,000 mg, 1,000 mg, Oral, BID, Lorena Heater, APRN - CNP, 1,000 mg at 11/19/21 2213    ipratropium-albuterol (DUONEB) nebulizer solution 1 ampule, 1 ampule, Inhalation, Q4H WA, Lorena Heater, APRN - CNP, 1 ampule at 11/21/21 1448  ALLERGIES Macrodantin [nitrofurantoin macrocrystal] and Sulfa antibiotics  Immunization History   Administered Date(s) Administered    COVID-19, Rj Hernandez, PF, 30mcg/0.3mL 01/28/2021, 02/18/2021, 08/18/2021      Internal Administration   First Dose COVID-19, Pfizer, PF, 30mcg/0.3mL  01/28/2021   Second Dose COVID-19, Pfizer, PF, 30mcg/0.3mL   02/18/2021       Last COVID Lab SARS-CoV-2 (no units)   Date Value   01/20/2021 Not Detected     SARS-CoV-2, PCR (no units)   Date Value   11/17/2021 Not Detected     SARS-CoV-2, NAAT (no units)   Date Value   11/17/2021 Not Detected        PAST MEDICAL HISTORY     Past Medical History:   Diagnosis Date    Abnormal EKG     left bundle branch block    Acute gout involving toe of right foot 9/3/2020    Acute gout involving toe of right foot 9/3/2020    Acute on chronic combined systolic (congestive) and diastolic (congestive) heart failure (HCC)     Cancer (HCC)     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    Cerebrovascular disease     Clotting disorder ([address] 1985    thrombophlebitis after c section delivery    Depression     15 years followed by dr Oni Wetzel    Hyperlipidemia     Hypertension     Hyperthyroidism     Leg swelling 9/3/2020    Lymphedema of both lower extremities 9/3/2020    Peripheral vascular disease ([address]     Renal failure 11/2012    renal transplant    Thrombophlebitis     after c section delivery    Thyroid disease      SURGICAL HISTORY       Past Surgical History:   Procedure Laterality Date   [address]    one normal delivery    COLONOSCOPY      DIAGNOSTIC CARDIAC CATH LAB PROCEDURE  2006    normal coronaries and 1996 normal coronaries    DIALYSIS FISTULA CREATION  march and july 2012    phase one and two patient will start dialysis when needed   [address]    transfemoral venous bypass grafts    KIDNEY TRANSPLANT  2016   [address]  2015   [address]  2015    PARATHYROIDECTOMY  2006    left parathyroid  implant and parathyroidectomy     ·      PHYSICAL EXAM          Vitals:    11/21/21 0204 11/21/21 0722 11/21/21 0756 11/21/21 1615   BP:   135/76 (!) 142/60   Pulse:   93 84   Resp: 18 18 18 20   Temp:   97 °F (36.1 °C) 97.4 °F (36.3 °C)   TempSrc:   Axillary Oral   SpO2:   97% 97%   Weight:       Height:         Physical Exam   Constitutional/General:  AWAKE ANSWER QUESTIONS  Head: NC/AT  NECK PAIN   Eyes: PERRL, EOMI  Pulmonary: Lungs DEC BS ANT  BIPAP  Cardiovascular: Tachy   Abdomen: Soft, + BS.    distension. Nontender. Extremities: Moves all extremities x 4.   ++ edema   Skin: Warm and dry    Neurologic:    No focal deficits  Psych: Normal Affect CONFUSED  PIV     DIAGNOSTIC RESULTS   RADIOLOGY:   XR CHEST (2 VW)    Result Date: 11/17/2021  EXAMINATION: TWO XRAY VIEWS OF THE CHEST 11/17/2021 9:31 am COMPARISON: None. HISTORY: ORDERING SYSTEM PROVIDED HISTORY: elevated BNP, wheezing TECHNOLOGIST PROVIDED HISTORY: Reason for exam:->elevated BNP, wheezing FINDINGS: There is prominence of the pulmonary vasculature. There is no effusion or pneumothorax. The cardiomediastinal silhouette is stable in size. The osseous structures are without acute process. Mild pulmonary vascular congestion. XR CHEST 1 VIEW    Result Date: 11/17/2021  EXAMINATION: ONE XRAY VIEW OF THE CHEST 11/17/2021 5:17 am COMPARISON: 2nd June 2020 HISTORY: ORDERING SYSTEM PROVIDED HISTORY: sepsis TECHNOLOGIST PROVIDED HISTORY: Reason for exam:->sepsis FINDINGS: Heart is mildly enlarged. Pulmonary vascularity is normal.  Lungs are clear. Neither costophrenic angle is blunted. Mild cardiomegaly.      LABS  Recent Labs     11/19/21  0703 11/20/21  0928 11/21/21  0904   WBC 11.7* 9.0 8.5   HGB 12.7 11.2* 11.3*   HCT 41.4 38.2 38.6   .3* 105.8* 106.3*   PLT 85* 110* 126*     Recent Labs     11/19/21  0703 11/19/21  0703 11/20/21  0928 11/20/21  0928 11/20/21  1432 11/20/21  1607 11/21/21  0909      < > 140 --  137  --  141   K 5.0   < > 5.3*   < > 6.1*   < > 5.4*      < > 103   < > 104   < > 107   CO2 25   < > 25   < > 25   < > 22   BUN 44*   < > 51*  --  54*  --  48*   CREATININE 2.3*   < > 2.1*  --  2.1*  --  1.3*   GFRAA 26   < > 29  --  29  --  50   LABGLOM 21   < > 24  --  24  --  41   GLUCOSE 102*   < > 91  --  103*  --  93   PROT 6.4  --  5.9*  --   --   --  5.9*   LABALBU 3.2*  --  3.1*  --   --   --  2.8*   CALCIUM 9.7   < > 9.6  --  9.4  --  9.9   BILITOT 0.5  --  0.5  --   --   --  0.5   ALKPHOS 60  --  51  --   --   --  49   AST 44*  --  34*  --   --   --  27   ALT 14  --  17  --   --   --  14    < > = values in this interval not displayed. Lab Results   Component Value Date    COVID19 Not Detected 11/17/2021     COVID-19/MARINA-COV2 LABS  Recent Labs     11/19/21  0703 11/20/21  0928 11/21/21  0909   LDH  --  281*  --    INR  --  1.0  --    PROTIME  --  11.5  --    AST 44* 34* 27   ALT 14 17 14     MICROBIOLOGY:     Cultures :   Lab Results   Component Value Date    BC 24 Hours no growth 11/17/2021    BC  12/17/2019     Refer to previous culture of CXBLD from 12-17-19 @1120 for  susceptibility results      ORG Klebsiella pneumoniae ssp pneumoniae 11/17/2021    ORG Staphylococcus coagulase-negative 11/17/2021    ORG Klebsiella pneumoniae ssp pneumoniae 11/17/2021     Lab Results   Component Value Date    BLOODCULT2  11/17/2021     Gram stain performed from blood culture bottle media  Gram positive cocci in clusters  Gram negative rods      BLOODCULT2 Identification and sensitivity to follow 11/17/2021    BLOODCULT2  11/17/2021     This organism was isolated in one set. Susceptibility testing is not routinely done as this  organism frequently represents skin contamination. Additional testing can be ordered by calling the  Microbiology Department.       ORG Klebsiella pneumoniae ssp pneumoniae 11/17/2021    ORG Staphylococcus coagulase-negative 11/17/2021    ORG Klebsiella pneumoniae ssp pneumoniae 11/17/2021     Urine Culture, Routine   Date Value Ref Range Status   11/17/2021 <10,000 CFU/mL  Mixed gram positive organisms   (A)  Final   11/17/2021 >100,000 CFU/ml  Final   04/09/2021   Final    10 to 100,000 CFU/mL  Mixed rubio isolated. Further workup and sensitivity testing  is not routinely indicated and will not be performed.   Mixed rubio isolated includes:  Escherichia coli  Mixed gram positive organisms  Alpha hemolytic Strep species  Yeast  Nonhemolytic Strep species       FINAL IMPRESSION    Patient is a 73 y.o. female who presented with   Chief Complaint   Patient presents with    Urinary Tract Infection    and admitted for Sepsis due to urinary tract infection ([facility] [A41.9, N39.0]  Urinary tract infection without hematuria, site unspecified [N39.0]  Sepsis, due to unspecified organism, unspecified whether acute organ dysfunction present ([facility] [A41.9]  · Fevers BETTER  · Leukocytosis  BETTER  · UTI/ dysuria  MIXED GPO/KLEBSIELLA  · Gn septicemia/prob pyelonephritis   · History of kidney transplant   · Rule out viral infection   resp viral panel neg   · Immunocompromised patient S/P LP SUGGEST ASEPTIC WITH 100 MONOCYTES    Plan:      acyclovir (ZOVIRAX) 700 mg in dextrose 5 % 100 mL IVPB, Q12H  cefepime (MAXIPIME) 1,000 mg in dextrose 5 % 50 mL extended infusion IVPB, Q12H  predniSONE (DELTASONE) tablet 5 mg, Daily  mycophenolate (CELLCEPT) capsule 1,000 mg, BID     Orders Placed This Encounter   Procedures    Culture, Respiratory    STREP PNEUMONIAE ANTIGEN    Legionella antigen, urine    Culture, CSF    Gram stain CSF    VDRL CSF    Herpes simplex virus PCR    Meningitis Encephalitis Panel CSF, Molecular    Glucose CSF    Protein CSF    CSF cell count with differential    CSF cell count with differential    Oligoclonal banding    Myelin Basic Protein, CSF    Lyme Disease Acute Reflexive Panel    HERPES SIMPLEX VIRUS (HSV) I/II ANTIBODIES IGG & IGM W/ REFLEX    Miscellaneous sendout 1 [place]ST NILE    Cryptococcal AG Reflex to Titer    T + B Lymphocyte Differential    MISCELLANEOUS SENDOUT 2   ·    CHECK LABS  CHANGE TO PIPATZO  Imaging and labs were reviewed. Thank you for involving me in the care of Angelina Tenorio. Please do not hesitate to call for any questions or concerns.     Electronically signed by Goldy Coreas MD on 11/21/2021 at 4:20 PM    Phone [phone]  Fax [phone]

## 2021-11-21 NOTE — PROGRESS NOTES
[facility] MD Debbie Flanagan MD Jacklynn Dom, MD. Rhys Martínez MD   Progress Note    2021    SUBJECTIVE:    : Seen in her room , was sleeping on CPAP , no LE edema , skin wrinkling in LE . No JVD   Case d/w RN   UO good   Cr slightly better /stable      : seen today , on o2/nc more alert oriented . Weak . Cr down . No LE edema . Poor po intake . PROBLEM LIST:    Principal Problem:    Sepsis secondary to UTI [facility])  Active Problems:    Depression    Hypertension    Acute kidney injury superimposed on CKD (HCC)    Thyroid disease    Acute on chronic combined systolic (congestive) and diastolic (congestive) heart failure (HCC)  Resolved Problems:    * No resolved hospital problems. 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    Cerebrovascular disease     Clotting disorder ([address]     thrombophlebitis after c section delivery    Depression     15 years followed by dr Radames Blanco Hyperlipidemia     Hypertension     Hyperthyroidism     Leg swelling 9/3/2020    Lymphedema of both lower extremities 9/3/2020    Peripheral vascular disease ([address]     Renal failure 2012    renal transplant    Thrombophlebitis     after c section delivery    Thyroid disease        Past Surgical History:   Procedure Laterality Date     SECTION  1985    one normal delivery    COLONOSCOPY      DIAGNOSTIC CARDIAC CATH LAB PROCEDURE      normal coronaries and 1996 normal coronaries    DIALYSIS FISTULA CREATION  march and july 2012    phase one and two patient will start dialysis when needed   [address]    transfemoral venous bypass grafts    KIDNEY TRANSPLANT  2016    KIDNEY TRANSPLANT  2015    KIDNEY TRANSPLANT  2015    PARATHYROIDECTOMY  2006    left parathyroid  implant and parathyroidectomy       Family History   Problem Relation Age of Onset    Diabetes Mother     Diabetes Father     Dementia Father         reports that she quit smoking about 6 years ago. She has a 20.00 pack-year smoking history. She has never used smokeless tobacco. She reports that she does not drink alcohol and does not use drugs. Review of Systems:   Constitutional: no fevers , no chills , feels ok   Eyes: no eye pain , no itching , no drainage  Ears, nose, mouth, throat, and face: no ear ,nose pain , hearing is ok ,no nasal drainage   Respiratory: no sob ,no cough ,no wheezing . Cardiovascular: no chest pain , no palpitation ,no sob . Gastrointestinal: no nausea, vomiting , constipation , no abdominal pain . Genitourinary:no urinary retention , no burning , dysuria . No polyuria   Hematologic/lymphatic: no bleeding , no cougulation issues . Musculoskeletal:no joint pain , no swelling . Neurological: no headaches ,no weakness , no numbness . Endocrine: no thirst , no weight issues .      MEDS (scheduled):    pregabalin  75 mg Oral QPM    sodium chloride flush  5-40 mL IntraVENous 2 times per day    Arformoterol Tartrate  15 mcg Nebulization BID    budesonide  500 mcg Nebulization BID    acyclovir  10 mg/kg (Adjusted) IntraVENous Q12H    cefepime (MAXIPIME) IVPB extended  1,000 mg IntraVENous Q12H    allopurinol  100 mg Oral Daily    [Held by provider] cloZAPine  50 mg Oral Nightly    cycloSPORINE modified  100 mg Oral BID    desvenlafaxine succinate  50 mg Oral QPM    docusate sodium  100 mg Oral BID    folic acid  1 mg Oral Daily    levothyroxine  50 mcg Oral Daily    [Held by provider] lisinopril  5 mg Oral Daily    metoprolol succinate  50 mg Oral Daily    predniSONE  5 mg Oral Daily    atorvastatin  10 mg Oral Daily    heparin (porcine)  5,000 Units SubCUTAneous 3 times per day    [Held by provider] mycophenolate  1,000 mg Oral BID    ipratropium-albuterol  1 ampule Inhalation Q4H WA       MEDS (infusions):   sodium chloride      sodium chloride 75 mL/hr at 11/21/21 0600    sodium chloride 25 mL (11/20/21 1935)       MEDS (prn):  sodium chloride flush, sodium chloride, ondansetron **OR** ondansetron, acetaminophen **OR** acetaminophen, polyethylene glycol    PHYSICAL EXAM:     Patient Vitals for the past 24 hrs:   BP Temp Temp src Pulse Resp SpO2   11/21/21 0756 135/76 97 °F (36.1 °C) Axillary 93 18 97 %   11/21/21 0722 -- -- -- -- 18 --   11/21/21 0204 -- -- -- -- 18 --   11/21/21 0120 -- -- -- 107 -- --   11/21/21 0045 123/68 98.6 °F (37 °C) Axillary 94 20 92 %   11/20/21 2156 -- -- -- -- 22 --   11/20/21 2015 130/61 -- -- 98 -- --   11/20/21 1839 -- -- -- -- 18 --   11/20/21 1615 130/60 98.2 °F (36.8 °C) Axillary 95 16 92 %   11/20/21 1300 -- -- -- -- -- 95 %   @      Intake/Output Summary (Last 24 hours) at 11/21/2021 1250  Last data filed at 11/21/2021 1019  Gross per 24 hour   Intake 10 ml   Output 1100 ml   Net -1090 ml         Wt Readings from Last 3 Encounters:   11/17/21 227 lb 3.2 oz (103.1 kg)   08/16/21 229 lb (103.9 kg)   10/01/20 245 lb 8 oz (111.4 kg)       Constitutional:  in no acute distress  Oral: mucus membranes moist  Neck: no JVD  Cardiovascular: S1, S2 regular rhythm, no murmur,or rub  Respiratory:  No crackles, no wheeze  Gastrointestinal:  Soft, nontender, nondistended, NABS  Ext: no edema, feet warm  Skin: dry, no rash  Neuro: awake, alert, interactive      DATA:    Recent Labs     11/19/21  0703 11/20/21  0928 11/21/21  0904   WBC 11.7* 9.0 8.5   HGB 12.7 11.2* 11.3*   HCT 41.4 38.2 38.6   .3* 105.8* 106.3*   PLT 85* 110* 126*     Recent Labs     11/19/21  0703 11/19/21  0703 11/20/21  0928 11/20/21  0928 11/20/21  1432 11/20/21  1607 11/21/21  0904 11/21/21  0909      < > 140  --  137  --   --  141   K 5.0   < > 5.3*   < > 6.1* 7.2*  --  5.4*      < > 103  --  104  --   --  107   CO2 25   < > 25  --  25  --   --  22   MG 2.4  --  2.4  --   --   --  2.3  --    PHOS 4.4  --  3.8  --   --   --  2.2*  --    BUN 44*   < > 51*  --  54*  --   --  48*   CREATININE 2.3*   < > 2.1*  --  2.1*  --   --  1.3*   ALT 14  --  17  --   --   --   --  14   AST 44*  --  34*  --   --   --   --  27   BILITOT 0.5  --  0.5  --   --   --   --  0.5   ALKPHOS 60  --  51  --   --   --   --  49    < > = values in this interval not displayed. No results found for: LABPROT    ASSESSMENT / RECOMMENDATIONS:      1. Acute kidney injury felt to be related to volume depletion in  context of urosepsis. 2.  Chronic kidney disease, stage 2 in the patient with kidney  transplant. Baseline _____ function with a creatinine of 1.1 to 1.3.  3. Immunosuppressant status, chronically on CellCept 1 gm b.i.d.,  cyclosporine 100 mg b.i.d., and prednisone. 4.  Urosepsis with bacteremia management per ID service.     PLAN:      Cr down to 1.3 (close to baseline )  UO ok   clincially better       -continue NS at conservative rate 35 cc/hr for nuritition purpose  -hold MMF for now in light of bacteremia .  Continue prograf and prednison   -encourage po intake   -PT/OT   -f/u serial bmp , UO         Electronically signed by Galileo Jones MD on 11/21/2021 at 12:50 PM

## 2021-11-21 NOTE — PROGRESS NOTES
Multiple specimens of blood noted hemolyzed today per lab. K+ were 5.0, 6.1, and 7.2 with most recent draw. A fourth specimen was obtained by myself via a 21g straight needle without a tourniquet and was sent to lab. Lab called to inform that specimen was grossly hemolyzed again and would be discarded. Dr. Oneil Berg notified with new orders for D50% 25g IV with 7 units IV Novolin-R. TAHMINA Lara notified.  Electronically signed by Brian Denny RN on 11/20/2021 at 7:24 PM

## 2021-11-21 NOTE — PROGRESS NOTES
Progress Note    Admitting Date and Time: 11/17/2021  4:50 AM  Admit Dx: Sepsis due to urinary tract infection ([facility] [A41.9, N39.0]  Urinary tract infection without hematuria, site unspecified [N39.0]  Sepsis, due to unspecified organism, unspecified whether acute organ dysfunction present ([facility] [A41.9]    Subjective:    Patient is  On a bipap . She does wake up to answer questions appropriately . She is more alert and hungry wan ts to eat ,    ROS: denies  n/v, HA unless stated above.      pregabalin  75 mg Oral QPM    sodium chloride flush  5-40 mL IntraVENous 2 times per day    Arformoterol Tartrate  15 mcg Nebulization BID    budesonide  500 mcg Nebulization BID    acyclovir  10 mg/kg (Adjusted) IntraVENous Q12H    cefepime (MAXIPIME) IVPB extended  1,000 mg IntraVENous Q12H    allopurinol  100 mg Oral Daily    [Held by provider] cloZAPine  50 mg Oral Nightly    cycloSPORINE modified  100 mg Oral BID    desvenlafaxine succinate  50 mg Oral QPM    docusate sodium  100 mg Oral BID    folic acid  1 mg Oral Daily    levothyroxine  50 mcg Oral Daily    [Held by provider] lisinopril  5 mg Oral Daily    metoprolol succinate  50 mg Oral Daily    predniSONE  5 mg Oral Daily    atorvastatin  10 mg Oral Daily    heparin (porcine)  5,000 Units SubCUTAneous 3 times per day    [Held by provider] mycophenolate  1,000 mg Oral BID    ipratropium-albuterol  1 ampule Inhalation Q4H WA     sodium chloride flush, 5-40 mL, PRN  sodium chloride, 25 mL, PRN  ondansetron, 4 mg, Q8H PRN   Or  ondansetron, 4 mg, Q6H PRN  acetaminophen, 650 mg, Q6H PRN   Or  acetaminophen, 650 mg, Q6H PRN  polyethylene glycol, 17 g, Daily PRN         Objective:    /76   Pulse 93   Temp 97 °F (36.1 °C) (Axillary)   Resp 18   Ht 5' 2\" (1.575 m)   Wt 227 lb 3.2 oz (103.1 kg) Comment: bed scale  SpO2 97%   BMI 41.56 kg/m²     General Appearance:  Laying in bed with bipap on , no acute distress , more alert   Head: normocephalic and atraumatic  Eyes: pupils equal, round, and reactive to light, conjunctivae normal  Neck: nuchal rigidity   Pulmonary/Chest:  Decrease air entry bilateral   Cardiovascular: regular rate, normal S1 and S2   Abdomen: soft, non-tender, non-distended, normal bowel sounds  Extremities: no cyanosis, no clubbing and trace  bilateral lower extremity edema  Neurologic: no cranial nerve deficit and speech normal    Recent Labs     11/20/21  0928 11/20/21  0928 11/20/21  1432 11/20/21  1607 11/21/21  0909     --  137  --  141   K 5.3*   < > 6.1* 7.2* 5.4*     --  104  --  107   CO2 25  --  25  --  22   BUN 51*  --  54*  --  48*   CREATININE 2.1*  --  2.1*  --  1.3*   GLUCOSE 91  --  103*  --  93   CALCIUM 9.6  --  9.4  --  9.9    < > = values in this interval not displayed. Recent Labs     11/19/21  0703 11/20/21  0928 11/21/21  0909   ALKPHOS 60 51 49   PROT 6.4 5.9* 5.9*   LABALBU 3.2* 3.1* 2.8*   BILITOT 0.5 0.5 0.5   AST 44* 34* 27   ALT 14 17 14       Recent Labs     11/19/21  0703 11/20/21  0928 11/21/21  0904   WBC 11.7* 9.0 8.5   RBC 3.97 3.61 3.63   HGB 12.7 11.2* 11.3*   HCT 41.4 38.2 38.6   .3* 105.8* 106.3*   MCH 32.0 31.0 31.1   MCHC 30.7* 29.3* 29.3*   RDW 14.6 14.4 14.2   PLT 85* 110* 126*   MPV 13.8* 14.1* 13.8*           Radiology:   CT CHEST WO CONTRAST   Final Result   Motion artifact limits evaluation of some images. Allowing for this   limitation, the findings are as follows:      Trace pleural effusions. Mild dependent atelectasis. Cardiomegaly. Question of enlarged ascending aorta, though suboptimally evaluated due to   motion artifact. No evidence of pneumonia. Small hiatal hernia. XR CHEST (2 VW)   Final Result   New left CP angle opacity suspicious for pleural effusion. Differential   includes summation artifact.   No other pulmonary consolidation      Persistent cardiomegaly with slight vascular congestion versus summation artifact from large body habitus         NM LUNG VENT/PERFUSION (VQ)   Final Result   Low probability for pulmonary embolus. US DUP LOWER EXTREMITIES BILATERAL VENOUS   Final Result   No evidence of DVT in either lower extremity. CT ABDOMEN PELVIS WO CONTRAST Additional Contrast? None   Final Result   Limited evaluation for pyelonephritis given lack of IV contrast.  Allowing   for this limitation, the findings are as follows: The transplant kidney demonstrates perinephric stranding, which can be seen   in the setting of infection/inflammation. No hydronephrosis. Atrophic native kidneys with multiple cysts. Recommend nonemergent renal   ultrasound for further evaluation of an intermediate density structure in the   right kidney. Mildly dilated loops of proximal small bowel are noted, with decompressed   distal small bowel but no discrete transition point. This is favored to   reflect ileus, however obstruction cannot be excluded. Recommend monitoring   of bowel function and follow-up KUBs. Cardiomegaly. US RETROPERITONEAL COMPLETE   Final Result   TRANSPLANT KIDNEY: No evidence of hydronephrosis. Unremarkable echogenicity. NATIVE KIDNEYS: Findings of chronic medical renal disease. No evidence of   hydronephrosis. Small left renal cysts. XR CHEST (2 VW)   Final Result   Mild pulmonary vascular congestion. XR CHEST 1 VIEW   Final Result   Mild cardiomegaly. IR LUMBAR PUNCTURE FOR DIAGNOSIS    (Results Pending)       Assessment:  Principal Problem:    Sepsis secondary to UTI [facility])  Active Problems:    Depression    Hypertension    Acute kidney injury superimposed on CKD (HCC)    Thyroid disease    Acute on chronic combined systolic (congestive) and diastolic (congestive) heart failure (HCC)  Resolved Problems:    * No resolved hospital problems. *      Plan:  1.  Sepsis secondary to Pyelonephritis/UTI:  WBC 13.5, Temp 101.7, heart rate 107, Urinalysis was positive for a UTI and blood cultures are positive for Gram positive cocci in clusters and Gram negative rods. Urine culture growing Klebsiella pneuomoniae. CT of the abd/pelvis showed perinephric stranding in transplant kidney. ID following. Continue Cefepime. 2. Acute respiratory failure with hypercapnia mainly :  She was requiring supplemental oxygen at 2 liters and is on room air at home. ABGs with pH 7.275->7.273 after wearing Bipap throughout the night and PCO2 64.8->64.8. Continue Bipap. More wake and alert ,   3. Nuchal rigidity: csf , monocytes ? Viral meningitis . On  Acyclovir. 4. Acute on chronic systolic and diastolic heart failure:  Pro-BNP greater than 2000, Last Echo on 12/21/19 revealed an EF of 01-80%, stage I diastolic dysfunction. Chest xray with pulmonary vascular congestion. Await echo results   5. Bacteremia:  Blood cultures positive for Klebsiella pneumoniae and Staphylococcus coagulase-negative. ID following. Continue cefepime. Vanco x 1 on 11/19. 6. Acute on chronic kidney disease in a patient with a history of kidney transplan:  Kidney transplant in 2015. Creatinine was 1.7 on admission, trended up to 2.3 and down to 1.3 today . Nephrology following. Lisinopril on hold. 7. Bilateral lower extremity edema:  D dimer was 836. Ultrasound of the lower extremities negative for DVT. Unable to do a CTA of the chest due to elevated creatinine. VQ scan showed low probability for PE. 8. Depression: Continue home medications. 9. Elevated troponin level:  Troponin levels: 52->88->56. Cardiology was consulted and likely acute myocardial injury secondary to sepsis, bacteremia and acute renal failure. Outpatient ischemic evaluation. 10. Hyperparathyroidism and parathyroidectomy 2006:  TSH 0.376 and free T4 1.08.   11. Hyperkalemia:  Potassium is 5.3. Today, was 7.2 yesterday ?  Hemolysis , was treated with d50 and insulin yesterday   Prognosis guarded ,

## 2021-11-22 LAB
ALBUMIN SERPL-MCNC: 3.5 G/DL (ref 3.5–5.2)
ALP BLD-CCNC: 53 U/L (ref 35–104)
ALT SERPL-CCNC: 16 U/L (ref 0–32)
ANION GAP SERPL CALCULATED.3IONS-SCNC: 12 MMOL/L (ref 7–16)
AST SERPL-CCNC: 26 U/L (ref 0–31)
B.E.: 1.1 MMOL/L (ref -3–3)
BILIRUB SERPL-MCNC: 0.6 MG/DL (ref 0–1.2)
BLOOD CULTURE, ROUTINE: NORMAL
BUN BLDV-MCNC: 41 MG/DL (ref 6–23)
CALCIUM SERPL-MCNC: 10.5 MG/DL (ref 8.6–10.2)
CHLORIDE BLD-SCNC: 105 MMOL/L (ref 98–107)
CO2: 25 MMOL/L (ref 22–29)
COHB: 0.2 % (ref 0–1.5)
COMMENT: NORMAL
CREAT SERPL-MCNC: 1.1 MG/DL (ref 0.5–1)
CRITICAL: NORMAL
CRYPTOCOCCAL ANTIGEN: NEGATIVE
DATE ANALYZED: NORMAL
DATE OF COLLECTION: NORMAL
GFR AFRICAN AMERICAN: >60
GFR NON-AFRICAN AMERICAN: 50 ML/MIN/1.73
GLUCOSE BLD-MCNC: 92 MG/DL (ref 74–99)
HCO3: 25.8 MMOL/L (ref 22–26)
HHB: 3.8 % (ref 0–5)
LAB: NORMAL
Lab: NORMAL
METHB: 0.2 % (ref 0–1.5)
MODE: NORMAL
O2 CONTENT: 17.4 ML/DL
O2 SATURATION: 96.2 % (ref 92–98.5)
O2HB: 95.8 % (ref 94–97)
OPERATOR ID: 1821
PATIENT TEMP: 37 C
PCO2: 41.6 MMHG (ref 35–45)
PH BLOOD GAS: 7.41 (ref 7.35–7.45)
PO2: 85.3 MMHG (ref 75–100)
POTASSIUM SERPL-SCNC: 4.6 MMOL/L (ref 3.5–5)
SODIUM BLD-SCNC: 142 MMOL/L (ref 132–146)
SOURCE, BLOOD GAS: NORMAL
THB: 12.9 G/DL (ref 11.5–16.5)
TIME ANALYZED: 1401
TOTAL PROTEIN: 6.3 G/DL (ref 6.4–8.3)

## 2021-11-22 PROCEDURE — 2580000003 HC RX 258: Performed by: NURSE PRACTITIONER

## 2021-11-22 PROCEDURE — 2580000003 HC RX 258: Performed by: SPECIALIST

## 2021-11-22 PROCEDURE — 82805 BLOOD GASES W/O2 SATURATION: CPT

## 2021-11-22 PROCEDURE — 6370000000 HC RX 637 (ALT 250 FOR IP): Performed by: NURSE PRACTITIONER

## 2021-11-22 PROCEDURE — 94640 AIRWAY INHALATION TREATMENT: CPT

## 2021-11-22 PROCEDURE — 1200000000 HC SEMI PRIVATE

## 2021-11-22 PROCEDURE — 2580000003 HC RX 258: Performed by: INTERNAL MEDICINE

## 2021-11-22 PROCEDURE — 6370000000 HC RX 637 (ALT 250 FOR IP): Performed by: INTERNAL MEDICINE

## 2021-11-22 PROCEDURE — 6360000002 HC RX W HCPCS: Performed by: NURSE PRACTITIONER

## 2021-11-22 PROCEDURE — 94660 CPAP INITIATION&MGMT: CPT

## 2021-11-22 PROCEDURE — 6360000002 HC RX W HCPCS: Performed by: SPECIALIST

## 2021-11-22 PROCEDURE — 36415 COLL VENOUS BLD VENIPUNCTURE: CPT

## 2021-11-22 PROCEDURE — 36600 WITHDRAWAL OF ARTERIAL BLOOD: CPT

## 2021-11-22 PROCEDURE — 97110 THERAPEUTIC EXERCISES: CPT

## 2021-11-22 PROCEDURE — 2700000000 HC OXYGEN THERAPY PER DAY

## 2021-11-22 PROCEDURE — 80053 COMPREHEN METABOLIC PANEL: CPT

## 2021-11-22 RX ADMIN — SODIUM CHLORIDE 25 ML: 9 INJECTION, SOLUTION INTRAVENOUS at 08:50

## 2021-11-22 RX ADMIN — HEPARIN SODIUM 5000 UNITS: 5000 INJECTION INTRAVENOUS; SUBCUTANEOUS at 21:27

## 2021-11-22 RX ADMIN — DOCUSATE SODIUM 100 MG: 100 CAPSULE ORAL at 08:49

## 2021-11-22 RX ADMIN — FOLIC ACID 1 MG: 1 TABLET ORAL at 08:49

## 2021-11-22 RX ADMIN — CYCLOSPORINE 100 MG: 25 CAPSULE, LIQUID FILLED ORAL at 08:49

## 2021-11-22 RX ADMIN — IPRATROPIUM BROMIDE AND ALBUTEROL SULFATE 1 AMPULE: .5; 2.5 SOLUTION RESPIRATORY (INHALATION) at 14:58

## 2021-11-22 RX ADMIN — IPRATROPIUM BROMIDE AND ALBUTEROL SULFATE 1 AMPULE: .5; 2.5 SOLUTION RESPIRATORY (INHALATION) at 06:56

## 2021-11-22 RX ADMIN — SODIUM CHLORIDE: 9 INJECTION, SOLUTION INTRAVENOUS at 01:54

## 2021-11-22 RX ADMIN — IPRATROPIUM BROMIDE AND ALBUTEROL SULFATE 1 AMPULE: .5; 2.5 SOLUTION RESPIRATORY (INHALATION) at 18:53

## 2021-11-22 RX ADMIN — HEPARIN SODIUM 5000 UNITS: 5000 INJECTION INTRAVENOUS; SUBCUTANEOUS at 06:14

## 2021-11-22 RX ADMIN — PIPERACILLIN SODIUM AND TAZOBACTAM SODIUM 3375 MG: 3; .375 INJECTION, POWDER, LYOPHILIZED, FOR SOLUTION INTRAVENOUS at 17:37

## 2021-11-22 RX ADMIN — PREGABALIN 75 MG: 75 CAPSULE ORAL at 17:37

## 2021-11-22 RX ADMIN — LEVOTHYROXINE SODIUM 50 MCG: 0.05 TABLET ORAL at 06:14

## 2021-11-22 RX ADMIN — Medication 10 ML: at 08:49

## 2021-11-22 RX ADMIN — CYCLOSPORINE 100 MG: 25 CAPSULE, LIQUID FILLED ORAL at 21:27

## 2021-11-22 RX ADMIN — PIPERACILLIN SODIUM AND TAZOBACTAM SODIUM 3375 MG: 3; .375 INJECTION, POWDER, LYOPHILIZED, FOR SOLUTION INTRAVENOUS at 03:50

## 2021-11-22 RX ADMIN — SODIUM CHLORIDE: 9 INJECTION, SOLUTION INTRAVENOUS at 13:54

## 2021-11-22 RX ADMIN — ARFORMOTEROL TARTRATE 15 MCG: 15 SOLUTION RESPIRATORY (INHALATION) at 06:56

## 2021-11-22 RX ADMIN — ACYCLOVIR SODIUM 700 MG: 50 INJECTION, SOLUTION INTRAVENOUS at 09:39

## 2021-11-22 RX ADMIN — ACYCLOVIR SODIUM 700 MG: 50 INJECTION, SOLUTION INTRAVENOUS at 01:55

## 2021-11-22 RX ADMIN — METOPROLOL SUCCINATE 50 MG: 50 TABLET, EXTENDED RELEASE ORAL at 08:49

## 2021-11-22 RX ADMIN — Medication 10 ML: at 21:27

## 2021-11-22 RX ADMIN — ATORVASTATIN CALCIUM 10 MG: 10 TABLET, FILM COATED ORAL at 08:49

## 2021-11-22 RX ADMIN — DOCUSATE SODIUM 100 MG: 100 CAPSULE ORAL at 21:27

## 2021-11-22 RX ADMIN — ACYCLOVIR SODIUM 700 MG: 50 INJECTION, SOLUTION INTRAVENOUS at 21:27

## 2021-11-22 RX ADMIN — ARFORMOTEROL TARTRATE 15 MCG: 15 SOLUTION RESPIRATORY (INHALATION) at 18:53

## 2021-11-22 RX ADMIN — BUDESONIDE 500 MCG: 0.5 INHALANT RESPIRATORY (INHALATION) at 06:56

## 2021-11-22 RX ADMIN — DESVENLAFAXINE 50 MG: 50 TABLET, EXTENDED RELEASE ORAL at 17:37

## 2021-11-22 RX ADMIN — SODIUM CHLORIDE: 9 INJECTION, SOLUTION INTRAVENOUS at 08:53

## 2021-11-22 RX ADMIN — PREDNISONE 5 MG: 5 TABLET ORAL at 08:49

## 2021-11-22 RX ADMIN — PIPERACILLIN SODIUM AND TAZOBACTAM SODIUM 3375 MG: 3; .375 INJECTION, POWDER, LYOPHILIZED, FOR SOLUTION INTRAVENOUS at 09:39

## 2021-11-22 RX ADMIN — ALLOPURINOL 100 MG: 100 TABLET ORAL at 08:49

## 2021-11-22 RX ADMIN — BUDESONIDE 500 MCG: 0.5 INHALANT RESPIRATORY (INHALATION) at 18:53

## 2021-11-22 ASSESSMENT — PAIN SCALES - GENERAL
PAINLEVEL_OUTOF10: 0

## 2021-11-22 ASSESSMENT — PAIN SCALES - WONG BAKER
WONGBAKER_NUMERICALRESPONSE: 0
WONGBAKER_NUMERICALRESPONSE: 0

## 2021-11-22 NOTE — PROGRESS NOTES
Physical Therapy Treatment Note/Plan of Care    Room #:  2287/9875-24  Patient Name: Abelardo Amos  YOB: 1955  MRN: [id number]    Date of Service: 11/22/2021     Tentative placement recommendation: Subacute rehab  Equipment recommendation: To be determined      Evaluating Physical Therapist: Dayna Monroe #[id number]      Specific Provider Orders/Date/Referring Provider :   11/17/21 1200   PT evaluation and treat Start: 11/17/21 1200, End: 11/17/21 1200, ONE TIME, Standing Count: 1 Occurrences, R    Tamiko Graham, APRN - CNP    Admitting Diagnosis:   Sepsis due to urinary tract infection ([facility] [A41.9, N39.0]  Urinary tract infection without hematuria, site unspecified [N39.0]  Sepsis, due to unspecified organism, unspecified whether acute organ dysfunction present [facility]) [A41.9]      Surgery: none  Visit Diagnoses       Codes    Sepsis, due to unspecified organism, unspecified whether acute organ dysfunction present [facility])    -  Primary A41.9    Urinary tract infection without hematuria, site unspecified     N39.0          Patient Active Problem List   Diagnosis    Peripheral vascular disease ([address]    Depression    Clotting disorder ([address]    Abnormal EKG    Cancer ([address]    Over weight    S/p cadaver renal transplant    ESRD (end stage renal disease) ([address]    Non morbid obesity due to excess calories    Hypertension    Leg swelling    Lymphedema of both lower extremities    Acute gout involving toe of right foot    Sepsis secondary to UTI ([address]    Acute kidney injury superimposed on CKD ([address]    Thyroid disease    Acute on chronic combined systolic (congestive) and diastolic (congestive) heart failure ([address]        ASSESSMENT of Current Deficits Patient exhibits decreased strength, balance and endurance impairing functional mobility, transfers and gait . Eyes closed throughout session, would only answer with one word answers.  Patient lethargic throughout session; patient unsafe for edge of bed and transfers d/t to pt overall debility, decreased activity tolerance, balance deficits, safety and fall risk. The patient will benefit from continued skilled therapy to increase strength and improve balance for safe functional mobility, to decrease risk of falls, and to meet goals at discharge. PHYSICAL THERAPY  PLAN OF CARE       Physical therapy plan of care is established based on physician order,  patient diagnosis and clinical assessment    Current Treatment Recommendations:    -Bed Mobility: Lower extremity exercises   -Sitting Balance: Incorporate reaching activities to activate trunk muscles   -Standing Balance: Perform strengthening exercises in standing to promote motor control with or without upper extremity support   -Transfers: Provide instruction on proper hand and foot position for adequate transfer of weight onto lower extremities and use of gait device if needed and Cues for hand placement, technique and safety. Provide stabilization to prevent fall   -Gait: Gait training and Standing activities to improve: base of support, weight shift, weight bearing      PT long term treatment goals are located in below grid    Patient and or family understand(s) diagnosis, prognosis, and plan of care. Frequency of treatments: Patient will be seen  daily.          Prior Level of Function: Patient ambulated independently   Rehab Potential: good  for baseline    Past medical history:   Past Medical History:   Diagnosis Date    Abnormal EKG     left bundle branch block    Acute gout involving toe of right foot 9/3/2020    Acute gout involving toe of right foot 9/3/2020    Acute on chronic combined systolic (congestive) and diastolic (congestive) heart failure (HCC)     Cancer (HCC)     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    Cerebrovascular disease     Clotting disorder ([facility] 1985    thrombophlebitis after c section delivery    Depression     15 years followed by dr Catnia Clark Hyperlipidemia     Hypertension     Hyperthyroidism     Leg swelling 9/3/2020    Lymphedema of both lower extremities 9/3/2020    Peripheral vascular disease ([address]     Renal failure 11/2012    renal transplant    Thrombophlebitis     after c section delivery    Thyroid disease      Past Surgical History:   Procedure Laterality Date   [address]    one normal delivery    COLONOSCOPY      DIAGNOSTIC CARDIAC CATH LAB PROCEDURE  2006    normal coronaries and 1996 normal coronaries    DIALYSIS FISTULA CREATION  march and july 2012    phase one and two patient will start dialysis when needed   [address]    transfemoral venous bypass grafts    KIDNEY TRANSPLANT  2016   [facility]  2015   [facility]  2015    PARATHYROIDECTOMY  2006    left parathyroid  implant and parathyroidectomy       SUBJECTIVE:    Precautions: Up with assistance, falls , shaky movement all extremities, desat quickly    Social history: Patient lives alone in a apartment 9th, elevator access  with No steps  to enter  Tub shower grab bars    Equipment owned: [address] [facility] [employer] [address] chair,      [address] [employer] [address]   How much difficulty turning over in bed?: A Lot  How much difficulty sitting down on / standing up from a chair with arms?: A Lot  How much difficulty moving from lying on back to sitting on side of bed?: A Lot  How much help from another person moving to and from a bed to a chair?: A Lot  How much help from another person needed to walk in hospital room?: Total  How much help from another person for climbing 3-5 steps with a railing?: Total  AM-PAC Inpatient Mobility Raw Score : 10  AM-PAC Inpatient T-Scale Score : 32.29  Mobility Inpatient CMS 0-100% Score: 76.75  Mobility Inpatient CMS G-Code Modifier : CL    Nursing cleared patient for PT treatment.      OBJECTIVE;   Initial Evaluation  Date: 11/18/2021 Treatment Date:  11/22/2021     Short Term/ Long Term   Goals   Was pt agreeable to Eval/treatment? Yes Yes  To be met in 4 days   Pain level   0/10  Very fatigued  Not stated    Bed Mobility    Rolling: Moderate assist of 1    Supine to sit: Moderate assist of 1    Sit to supine: Moderate assist of 1    Scooting: Moderate assist of 1   Rolling: Not assessed    Supine to sit: Not assessed    Sit to supine: Not assessed    Scooting: Not assessed     Rolling: Independent    Supine to sit: Independent    Sit to supine: Independent    Scooting: Independent     Transfers Sit to stand: Not assessed  d/t to pt overall debility, decreased activity tolerance, balance deficits, safety and fall risk. Sit to stand: Not assessed  d/t to pt overall debility, decreased activity tolerance, balance deficits, safety and fall risk. Sit to stand:  Moderate assist of 1    Ambulation    not assessed  not assessed     5 feet using  wheeled walker with Moderate assist of 1          ROM Within functional limits    Increase range of motion 10% of affected joints    Strength BUE:  refer to OT eval  RLE:  4-/5  LLE:  4-/5  Increase strength in affected mm groups by 1/3 grade   Balance Sitting EOB:  poor   Dynamic Standing:  not assessed  Sitting EOB: not assessed   Dynamic Standing: not assessed    Sitting EOB:  fair   Dynamic Standing: fair      Patient is Alert & Oriented x person, place, time and situation and follows directions    Sensation:  Patient  denies numbness/tingling   Edema:  no   Endurance: fair  -    Vitals: Bipap   Blood Pressure at rest  Blood Pressure during session    Heart Rate at rest  Heart Rate during session    SPO2 at rest %  SPO2 during session      Patient education  Patient educated on role of Physical Therapy, risks of immobility, safety and plan of care,  importance of mobility while in hospital , purse lip breathing, ankle pumps, quad set and glut set for edema control, blood clot prevention and safety      Patient response to education:   Pt verbalized understanding Pt demonstrated skill Pt requires further education in this area   Yes Partial Yes      Treatment:  Patient practiced and was instructed/facilitated in the following treatment: Patient on bipap this session very lethargic. Performed supine exercise. Therapeutic Exercises:  ankle pumps, heel slide, hip abduction/adduction and straight leg raise  x 10 reps. At end of session, patient in bed with alarm and nursing aid present call light and phone within reach,  all lines and tubes intact, nursing notified. Patient would benefit from continued skilled Physical Therapy to improve functional independence and quality of life.          Patient's/ family goals   rehab    Time in  200  Time out  257    Total Treatment Time  11 minutes    CPT codes:    Therapeutic exercises (15016)   11 minutes  1 unit(s)    Ziggy Crowe PTA [facility]#[id number]

## 2021-11-22 NOTE — PLAN OF CARE
Problem: Pain:  Goal: Pain level will decrease  Description: Pain level will decrease  11/22/2021 0048 by Saud Rodriguez RN  Outcome: Met This Shift     Problem: Pain:  Goal: Control of acute pain  Description: Control of acute pain  11/22/2021 0048 by Saud Rodriguez RN  Outcome: Met This Shift     Problem: Pain:  Goal: Control of chronic pain  Description: Control of chronic pain  11/22/2021 0048 by Saud Rodriguez RN  Outcome: Met This Shift     Problem: Falls - Risk of:  Goal: Will remain free from falls  Description: Will remain free from falls  11/22/2021 0048 by Saud Rodriguez RN  Outcome: Met This Shift     Problem: Falls - Risk of:  Goal: Absence of physical injury  Description: Absence of physical injury  11/22/2021 0048 by Saud Rodriguez RN  Outcome: Met This Shift

## 2021-11-22 NOTE — PROGRESS NOTES
[facility]     [address] [place] [address]  [place], [address] [place] [address] Street  Phone [phone]   [phone]      Admit Date: 2021  4:50 AM  Pt Name: Anika Cunningham  MRN: [id number]  : 1955  Reason for Consult:    Chief Complaint   Patient presents with    Urinary Tract Infection     Requesting Physician:  Kassi Calwdell MD  PCP: Kassi Caldwell MD  History Obtained From:  patient, chart   ID consulted for Sepsis due to urinary tract infection ([address] [A41.9, N39.0]  Urinary tract infection without hematuria, site unspecified [N39.0]  Sepsis, due to unspecified organism, unspecified whether acute organ dysfunction present ([address] [A41.9]  on hospital day 5   Face to face encounter   CHIEF COMPLAINT       Chief Complaint   Patient presents with    Urinary Tract Infection     HISTORYOF PRESENT ILLNESS   Anika Cunningham is a 73 y.o. female who presents with   has a past medical history of Abnormal EKG, Acute gout involving toe of right foot, Acute gout involving toe of right foot, Acute on chronic combined systolic (congestive) and diastolic (congestive) heart failure ([address] Cancer ([address] Cerebrovascular disease, Clotting disorder ([address] Depression, Hyperlipidemia, Hypertension, Hyperthyroidism, Leg swelling, Lymphedema of both lower extremities, Peripheral vascular disease ([address] Renal failure, Thrombophlebitis, and Thyroid disease. Urinary Tract Infection        Patient admitted to hospital from home. She was weak, reports dysuria, and generalized weakness. She has been having fever and chills at home. She was hypoxic at home- 88 %. She has a cough. She was admitted for further evaluation. She is known to ID service- she was last seen in 2019-for a klebsiella UTI- was discharge with cefepime due to tachycardia and prolonged QT with cipro.      She is currently on IV cefepime  Her temp has been as high as 101.7   She has a cough - and is tachypnic     ID has been asked to evaluate and manage atbs    Current visit  Dos  11/22/21   PT IN BED   STILL CONFUSED AT TIME ON BIPAP  TMAX99.2   CR1. 1 WBC8.5  CRYPTO NEG   REVIEW OF SYSTEMS     CONSTITUTIONAL:   AS IN HPI   CURRENT MEDICATIONS     Current Facility-Administered Medications:     pregabalin (LYRICA) capsule 75 mg, 75 mg, Oral, QPM, Susannah Ramirez MD, 75 mg at 11/22/21 1737    piperacillin-tazobactam (ZOSYN) 3,375 mg in dextrose 5 % 50 mL IVPB extended infusion (mini-bag), 3,375 mg, IntraVENous, Q8H, Harman Sales MD, Last Rate: 12.5 mL/hr at 11/22/21 1737, 3,375 mg at 11/22/21 1737    0.9 % sodium chloride infusion, , IntraVENous, Q8H, Harman Sales MD, Stopped at 11/22/21 1544    sodium chloride flush 0.9 % injection 5-40 mL, 5-40 mL, IntraVENous, 2 times per day, Terrall Area, APRN - CNP, 10 mL at 11/22/21 0849    sodium chloride flush 0.9 % injection 5-40 mL, 5-40 mL, IntraVENous, PRN, [place] Area, APRN - CNP    0.9 % sodium chloride infusion, 25 mL, IntraVENous, PRN, Terrall Area, APRN - CNP, Last Rate: 100 mL/hr at 11/22/21 0850, 25 mL at 11/22/21 0850    Arformoterol Tartrate (BROVANA) nebulizer solution 15 mcg, 15 mcg, Nebulization, BID, Terrall Area, APRN - CNP, 15 mcg at 11/22/21 0656    budesonide (PULMICORT) nebulizer suspension 500 mcg, 500 mcg, Nebulization, BID, Terrall Area, APRN - CNP, 500 mcg at 11/22/21 0656    acyclovir (ZOVIRAX) 700 mg in dextrose 5 % 100 mL IVPB, 10 mg/kg (Adjusted), IntraVENous, Q12H, Terrall Area, APRN - CNP, Stopped at 11/22/21 1047    0.9 % sodium chloride infusion, , IntraVENous, Continuous, Roge Don MD, Last Rate: 35 mL/hr at 11/22/21 0154, New Bag at 11/22/21 0154    allopurinol (ZYLOPRIM) tablet 100 mg, 100 mg, Oral, Daily, [facility], APRN - CNP, 100 mg at 11/22/21 0849    [Held by provider] cloZAPine (CLOZARIL) tablet 50 mg, 50 mg, Oral, Nightly, [facility], APRN - CNP, 50 mg at 11/19/21 8433   cycloSPORINE modified (NEORAL) capsule 100 mg, 100 mg, Oral, BID, Larraine Henrrys, APRN - CNP, 100 mg at 11/22/21 0849    desvenlafaxine succinate (PRISTIQ) extended release tablet 50 mg, 50 mg, Oral, QPM, Larraine Benes, APRN - CNP, 50 mg at 11/22/21 1737    docusate sodium (COLACE) capsule 100 mg, 100 mg, Oral, BID, Larraine Henrrys, APRN - CNP, 100 mg at 48/71/09 9150    folic acid (FOLVITE) tablet 1 mg, 1 mg, Oral, Daily, Larraine Benes, APRN - CNP, 1 mg at 11/22/21 0849    levothyroxine (SYNTHROID) tablet 50 mcg, 50 mcg, Oral, Daily, Larraine Henrrys, APRN - CNP, 50 mcg at 11/22/21 1204    [Held by provider] lisinopril (PRINIVIL;ZESTRIL) tablet 5 mg, 5 mg, Oral, Daily, Larraine Henrrys, APRN - CNP, 5 mg at 11/17/21 1430    metoprolol succinate (TOPROL XL) extended release tablet 50 mg, 50 mg, Oral, Daily, Larraine Henrrys, APRN - CNP, 50 mg at 11/22/21 0849    predniSONE (DELTASONE) tablet 5 mg, 5 mg, Oral, Daily, Larraine Henrrys, APRN - CNP, 5 mg at 11/22/21 0849    atorvastatin (LIPITOR) tablet 10 mg, 10 mg, Oral, Daily, Larraine Henrrys, APRN - CNP, 10 mg at 11/22/21 0849    ondansetron (ZOFRAN-ODT) disintegrating tablet 4 mg, 4 mg, Oral, Q8H PRN **OR** ondansetron (ZOFRAN) injection 4 mg, 4 mg, IntraVENous, Q6H PRN, Larraine Mary, APRN - CNP    acetaminophen (TYLENOL) tablet 650 mg, 650 mg, Oral, Q6H PRN, 650 mg at 11/17/21 2005 **OR** acetaminophen (TYLENOL) suppository 650 mg, 650 mg, Rectal, Q6H PRN, Larraine Henrrys, APRN - CNP    polyethylene glycol (GLYCOLAX) packet 17 g, 17 g, Oral, Daily PRN, Larraine Henrrys, APRN - CNP    heparin (porcine) injection 5,000 Units, 5,000 Units, SubCUTAneous, 3 times per day, Larraine Henrrys, APRN - CNP, 5,000 Units at 11/22/21 6405    [Held by provider] mycophenolate (CELLCEPT) capsule 1,000 mg, 1,000 mg, Oral, BID, Larraine Benes, APRN - CNP, 1,000 mg at 11/19/21 2213    ipratropium-albuterol (DUONEB) nebulizer solution 1 ampule, 1 ampule, Inhalation, Q4H WA, Breann Winslow, APRN - CNP, 1 ampule at 11/22/21 1458  ALLERGIES     Macrodantin [nitrofurantoin macrocrystal] and Sulfa antibiotics  Immunization History   Administered Date(s) Administered    COVID-19, [place] Peter, PF, 30mcg/0.3mL 01/28/2021, 02/18/2021, 08/18/2021      Internal Administration   First Dose COVID-19, Pfizer, PF, 30mcg/0.3mL  01/28/2021   Second Dose COVID-19, Pfizer, PF, 30mcg/0.3mL   02/18/2021       Last COVID Lab SARS-CoV-2 (no units)   Date Value   01/20/2021 Not Detected     SARS-CoV-2, PCR (no units)   Date Value   11/17/2021 Not Detected     SARS-CoV-2, NAAT (no units)   Date Value   11/17/2021 Not Detected        PAST MEDICAL HISTORY     Past Medical History:   Diagnosis Date    Abnormal EKG     left bundle branch block    Acute gout involving toe of right foot 9/3/2020    Acute gout involving toe of right foot 9/3/2020    Acute on chronic combined systolic (congestive) and diastolic (congestive) heart failure (HCC)     Cancer (HCC)     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    Cerebrovascular disease     Clotting disorder ([address] 1985    thrombophlebitis after c section delivery    Depression     15 years followed by dr Brigitte Gaston    Hyperlipidemia     Hypertension     Hyperthyroidism     Leg swelling 9/3/2020    Lymphedema of both lower extremities 9/3/2020    Peripheral vascular disease ([address]     Renal failure 11/2012    renal transplant    Thrombophlebitis     after c section delivery    Thyroid disease      SURGICAL HISTORY       Past Surgical History:   Procedure Laterality Date   [address]    one normal delivery    COLONOSCOPY      DIAGNOSTIC CARDIAC CATH LAB PROCEDURE  2006    normal coronaries and 1996 normal coronaries    DIALYSIS FISTULA CREATION  march and july 2012    phase one and two patient will start dialysis when needed   [address]    transfemoral venous bypass grafts    KIDNEY TRANSPLANT  2016    KIDNEY TRANSPLANT  2015    KIDNEY TRANSPLANT  2015    PARATHYROIDECTOMY  2006    left parathyroid  implant and parathyroidectomy     PHYSICAL EXAM          Vitals:    11/22/21 0658 11/22/21 0803 11/22/21 1403 11/22/21 1502   BP:  (!) 174/93  (!) 142/60   Pulse:  86  89   Resp: 22 24 25 22   Temp:  98.2 °F (36.8 °C)  98.7 °F (37.1 °C)   TempSrc:  Axillary  Infrared   SpO2:  97%  99%   Weight:       Height:         Physical Exam   Constitutional/General:  BIPAP  Head: NC/AT  Eyes: PERRL, EOMI  Pulmonary: Lungs DEC BS ANT  BIPAP  Cardiovascular:  S1/S2   Abdomen: Soft, + BS.    distension. Nontender. Extremities: Moves all extremities x 4.   ++ edema   Skin: Warm and dry  NO RASH   Neurologic:    No focal deficits  PIV     DIAGNOSTIC RESULTS   RADIOLOGY:   XR CHEST (2 VW)    Result Date: 11/17/2021  EXAMINATION: TWO XRAY VIEWS OF THE CHEST 11/17/2021 9:31 am COMPARISON: None. HISTORY: ORDERING SYSTEM PROVIDED HISTORY: elevated BNP, wheezing TECHNOLOGIST PROVIDED HISTORY: Reason for exam:->elevated BNP, wheezing FINDINGS: There is prominence of the pulmonary vasculature. There is no effusion or pneumothorax. The cardiomediastinal silhouette is stable in size. The osseous structures are without acute process. Mild pulmonary vascular congestion. XR CHEST 1 VIEW    Result Date: 11/17/2021  EXAMINATION: ONE XRAY VIEW OF THE CHEST 11/17/2021 5:17 am COMPARISON: 2nd June 2020 HISTORY: ORDERING SYSTEM PROVIDED HISTORY: sepsis TECHNOLOGIST PROVIDED HISTORY: Reason for exam:->sepsis FINDINGS: Heart is mildly enlarged. Pulmonary vascularity is normal.  Lungs are clear. Neither costophrenic angle is blunted. Mild cardiomegaly.      LABS  Recent Labs     11/20/21  0928 11/21/21  0904   WBC 9.0 8.5   HGB 11.2* 11.3*   HCT 38.2 38.6   .8* 106.3*   * 126*     Recent Labs     11/20/21  0928 11/20/21  0928 11/20/21  1432 11/20/21  1607 11/21/21  0909 11/21/21  0909 11/22/21  0921      < > 137  --  141 --  142   K 5.3*   < > 6.1*   < > 5.4*   < > 4.6      < > 104   < > 107   < > 105   CO2 25   < > 25   < > 22   < > 25   BUN 51*   < > 54*  --  48*  --  41*   CREATININE 2.1*   < > 2.1*  --  1.3*  --  1.1*   GFRAA 29   < > 29  --  50  --  >60   LABGLOM 24   < > 24  --  41  --  50   GLUCOSE 91   < > 103*  --  93  --  92   PROT 5.9*  --   --   --  5.9*  --  6.3*   LABALBU 3.1*  --   --   --  2.8*  --  3.5   CALCIUM 9.6   < > 9.4  --  9.9  --  10.5*   BILITOT 0.5  --   --   --  0.5  --  0.6   ALKPHOS 51  --   --   --  49  --  53   AST 34*  --   --   --  27  --  26   ALT 17  --   --   --  14  --  16    < > = values in this interval not displayed. Lab Results   Component Value Date    COVID19 Not Detected 11/17/2021     COVID-19/MARINA-COV2 LABS  Recent Labs     11/20/21  0928 11/21/21  0909 11/22/21  0921   *  --   --    INR 1.0  --   --    PROTIME 11.5  --   --    AST 34* 27 26   ALT 17 14 16     MICROBIOLOGY:     Cultures :   Lab Results   Component Value Date    BC 5 Days no growth 11/17/2021    BC  12/17/2019     Refer to previous culture of CXBLD from 12-17-19 @1120 for  susceptibility results      ORG Klebsiella pneumoniae ssp pneumoniae 11/17/2021    ORG Staphylococcus coagulase-negative 11/17/2021    ORG Klebsiella pneumoniae ssp pneumoniae 11/17/2021     Lab Results   Component Value Date    BLOODCULT2  11/17/2021     Gram stain performed from blood culture bottle media  Gram positive cocci in clusters  Gram negative rods      BLOODCULT2 Identification and sensitivity to follow 11/17/2021    BLOODCULT2  11/17/2021     This organism was isolated in one set. Susceptibility testing is not routinely done as this  organism frequently represents skin contamination. Additional testing can be ordered by calling the  Microbiology Department.       ORG Klebsiella pneumoniae ssp pneumoniae 11/17/2021    ORG Staphylococcus coagulase-negative 11/17/2021    ORG Klebsiella pneumoniae ssp pneumoniae 11/17/2021 Urine Culture, Routine   Date Value Ref Range Status   11/17/2021 <10,000 CFU/mL  Mixed gram positive organisms   (A)  Final   11/17/2021 >100,000 CFU/ml  Final   04/09/2021   Final    10 to 100,000 CFU/mL  Mixed rubio isolated. Further workup and sensitivity testing  is not routinely indicated and will not be performed.   Mixed rubio isolated includes:  Escherichia coli  Mixed gram positive organisms  Alpha hemolytic Strep species  Yeast  Nonhemolytic Strep species       FINAL IMPRESSION    Patient is a 73 y.o. female who presented with   Chief Complaint   Patient presents with    Urinary Tract Infection    and admitted for Sepsis due to urinary tract infection ([facility] [A41.9, N39.0]  Urinary tract infection without hematuria, site unspecified [N39.0]  Sepsis, due to unspecified organism, unspecified whether acute organ dysfunction present ([facility] [A41.9]  · Fevers BETTER  · Leukocytosis  BETTER  · UTI/ dysuria  MIXED GPO/KLEBSIELLA  · Gn septicemia/prob pyelonephritis   · History of kidney transplant   · Rule out viral infection   resp viral panel neg   · Immunocompromised patient S/P LP SUGGEST ASEPTIC WITH 100 MONOCYTES  MRSA NEG   Plan:      acyclovir (ZOVIRAX) 700 mg in dextrose 5 % 100 mL IVPB, Q12H  cefepime (MAXIPIME) 1,000 mg in dextrose 5 % 50 mL extended infusion IVPB, Q12H  predniSONE (DELTASONE) tablet 5 mg, Daily  mycophenolate (CELLCEPT) capsule 1,000 mg, BID     Orders Placed This Encounter   Procedures    Culture, Respiratory    STREP PNEUMONIAE ANTIGEN    Legionella antigen, urine    Culture, CSF    Gram stain CSF    VDRL CSF    Herpes simplex virus PCR    Meningitis Encephalitis Panel CSF, Molecular    Glucose CSF    Protein CSF    CSF cell count with differential    CSF cell count with differential    Oligoclonal banding    Myelin Basic Protein, CSF    Lyme Disease Acute Reflexive Panel    HERPES SIMPLEX VIRUS (HSV) I/II ANTIBODIES IGG & IGM W/ REFLEX    Miscellaneous sendout 1 WEST NILE    Cryptococcal AG Reflex to Titer    T + B Lymphocyte Differential    MISCELLANEOUS SENDOUT 2     CHECK LABS F/U PROCAL   CHANGE TO PIPATZO  F/U BLOOD CX NGTD  NO CHANGE IN ATBX  Imaging and labs were reviewed. Thank you for involving me in the care of Conrad Marcello. Please do not hesitate to call for any questions or concerns.     Electronically signed by Radha Feliciano MD on 11/22/2021 at 6:22 PM    Phone [phone]  Fax [phone]

## 2021-11-22 NOTE — CARE COORDINATION
SS Note: Covid negative 11/17/21. Louis Tomas, HOSP [facility] DR FAUSTO PEREZ continuing to follow and inquiring about duration of Zosyn IV antibiotics upon discharge prior to decision regarding acceptance of pt and bed availability. SW contacted second preference of [facility], no available beds at this time. NO PRECERT required for any SNF.   Electronically signed by AG Luna on 11/22/2021 at 2:44 PM

## 2021-11-23 LAB
ALBUMIN SERPL-MCNC: 3 G/DL (ref 3.5–5.2)
ALP BLD-CCNC: 51 U/L (ref 35–104)
ALT SERPL-CCNC: 15 U/L (ref 0–32)
ANION GAP SERPL CALCULATED.3IONS-SCNC: 10 MMOL/L (ref 7–16)
AST SERPL-CCNC: 19 U/L (ref 0–31)
B.E.: 0.8 MMOL/L (ref -3–3)
BASOPHILS ABSOLUTE: 0.05 E9/L (ref 0–0.2)
BASOPHILS RELATIVE PERCENT: 0.5 % (ref 0–2)
BILIRUB SERPL-MCNC: 0.8 MG/DL (ref 0–1.2)
BUN BLDV-MCNC: 32 MG/DL (ref 6–23)
CALCIUM SERPL-MCNC: 10.4 MG/DL (ref 8.6–10.2)
CHLORIDE BLD-SCNC: 105 MMOL/L (ref 98–107)
CO2: 23 MMOL/L (ref 22–29)
COHB: 0.1 % (ref 0–1.5)
COMMENT: ABNORMAL
CREAT SERPL-MCNC: 1.1 MG/DL (ref 0.5–1)
CRITICAL: ABNORMAL
CULTURE, BLOOD 2: ABNORMAL
DATE ANALYZED: ABNORMAL
DATE OF COLLECTION: ABNORMAL
EOSINOPHILS ABSOLUTE: 0.2 E9/L (ref 0.05–0.5)
EOSINOPHILS RELATIVE PERCENT: 1.8 % (ref 0–6)
GFR AFRICAN AMERICAN: >60
GFR NON-AFRICAN AMERICAN: 50 ML/MIN/1.73
GLUCOSE BLD-MCNC: 92 MG/DL (ref 74–99)
HCO3: 25.9 MMOL/L (ref 22–26)
HCT VFR BLD CALC: 39.6 % (ref 34–48)
HEMOGLOBIN: 11.9 G/DL (ref 11.5–15.5)
HHB: 6.8 % (ref 0–5)
IMMATURE GRANULOCYTES #: 0.08 E9/L
IMMATURE GRANULOCYTES %: 0.7 % (ref 0–5)
LAB: ABNORMAL
LYMPHOCYTES ABSOLUTE: 0.84 E9/L (ref 1.5–4)
LYMPHOCYTES RELATIVE PERCENT: 7.6 % (ref 20–42)
Lab: ABNORMAL
MCH RBC QN AUTO: 31.2 PG (ref 26–35)
MCHC RBC AUTO-ENTMCNC: 30.1 % (ref 32–34.5)
MCV RBC AUTO: 103.7 FL (ref 80–99.9)
METHB: 0.3 % (ref 0–1.5)
MODE: ABNORMAL
MONOCYTES ABSOLUTE: 0.97 E9/L (ref 0.1–0.95)
MONOCYTES RELATIVE PERCENT: 8.8 % (ref 2–12)
NEUTROPHILS ABSOLUTE: 8.94 E9/L (ref 1.8–7.3)
NEUTROPHILS RELATIVE PERCENT: 80.6 % (ref 43–80)
O2 CONTENT: 16.6 ML/DL
O2 SATURATION: 93.2 % (ref 92–98.5)
O2HB: 92.8 % (ref 94–97)
OPERATOR ID: 1821
ORGANISM: ABNORMAL
ORGANISM: ABNORMAL
PATIENT TEMP: 37 C
PCO2: 43.2 MMHG (ref 35–45)
PDW BLD-RTO: 13.9 FL (ref 11.5–15)
PH BLOOD GAS: 7.39 (ref 7.35–7.45)
PLATELET # BLD: 147 E9/L (ref 130–450)
PMV BLD AUTO: 14 FL (ref 7–12)
PO2: 68 MMHG (ref 75–100)
POTASSIUM SERPL-SCNC: 4.3 MMOL/L (ref 3.5–5)
PROCALCITONIN: 0.44 NG/ML (ref 0–0.08)
RBC # BLD: 3.82 E12/L (ref 3.5–5.5)
SODIUM BLD-SCNC: 138 MMOL/L (ref 132–146)
SOURCE, BLOOD GAS: ABNORMAL
THB: 12.7 G/DL (ref 11.5–16.5)
TIME ANALYZED: 1333
TOTAL PROTEIN: 6.2 G/DL (ref 6.4–8.3)
WBC # BLD: 11.1 E9/L (ref 4.5–11.5)

## 2021-11-23 PROCEDURE — 36600 WITHDRAWAL OF ARTERIAL BLOOD: CPT

## 2021-11-23 PROCEDURE — 97535 SELF CARE MNGMENT TRAINING: CPT

## 2021-11-23 PROCEDURE — 6360000002 HC RX W HCPCS: Performed by: NURSE PRACTITIONER

## 2021-11-23 PROCEDURE — 6370000000 HC RX 637 (ALT 250 FOR IP): Performed by: NURSE PRACTITIONER

## 2021-11-23 PROCEDURE — 2580000003 HC RX 258: Performed by: INTERNAL MEDICINE

## 2021-11-23 PROCEDURE — 84145 PROCALCITONIN (PCT): CPT

## 2021-11-23 PROCEDURE — 6360000002 HC RX W HCPCS: Performed by: SPECIALIST

## 2021-11-23 PROCEDURE — 85025 COMPLETE CBC W/AUTO DIFF WBC: CPT

## 2021-11-23 PROCEDURE — 1200000000 HC SEMI PRIVATE

## 2021-11-23 PROCEDURE — 94660 CPAP INITIATION&MGMT: CPT

## 2021-11-23 PROCEDURE — 2700000000 HC OXYGEN THERAPY PER DAY

## 2021-11-23 PROCEDURE — 2580000003 HC RX 258: Performed by: NURSE PRACTITIONER

## 2021-11-23 PROCEDURE — 80053 COMPREHEN METABOLIC PANEL: CPT

## 2021-11-23 PROCEDURE — 82805 BLOOD GASES W/O2 SATURATION: CPT

## 2021-11-23 PROCEDURE — 2580000003 HC RX 258: Performed by: SPECIALIST

## 2021-11-23 PROCEDURE — 92610 EVALUATE SWALLOWING FUNCTION: CPT | Performed by: SPEECH-LANGUAGE PATHOLOGIST

## 2021-11-23 PROCEDURE — 36415 COLL VENOUS BLD VENIPUNCTURE: CPT

## 2021-11-23 PROCEDURE — 94640 AIRWAY INHALATION TREATMENT: CPT

## 2021-11-23 RX ADMIN — CYCLOSPORINE 100 MG: 25 CAPSULE, LIQUID FILLED ORAL at 21:56

## 2021-11-23 RX ADMIN — IPRATROPIUM BROMIDE AND ALBUTEROL SULFATE 1 AMPULE: .5; 2.5 SOLUTION RESPIRATORY (INHALATION) at 16:00

## 2021-11-23 RX ADMIN — DOCUSATE SODIUM 100 MG: 100 CAPSULE ORAL at 21:55

## 2021-11-23 RX ADMIN — CYCLOSPORINE 100 MG: 25 CAPSULE, LIQUID FILLED ORAL at 09:42

## 2021-11-23 RX ADMIN — IPRATROPIUM BROMIDE AND ALBUTEROL SULFATE 1 AMPULE: .5; 2.5 SOLUTION RESPIRATORY (INHALATION) at 18:31

## 2021-11-23 RX ADMIN — DESVENLAFAXINE 50 MG: 50 TABLET, EXTENDED RELEASE ORAL at 19:25

## 2021-11-23 RX ADMIN — SODIUM CHLORIDE: 9 INJECTION, SOLUTION INTRAVENOUS at 21:55

## 2021-11-23 RX ADMIN — DOCUSATE SODIUM 100 MG: 100 CAPSULE ORAL at 09:42

## 2021-11-23 RX ADMIN — HEPARIN SODIUM 5000 UNITS: 5000 INJECTION INTRAVENOUS; SUBCUTANEOUS at 06:10

## 2021-11-23 RX ADMIN — METOPROLOL SUCCINATE 50 MG: 50 TABLET, EXTENDED RELEASE ORAL at 09:42

## 2021-11-23 RX ADMIN — PIPERACILLIN SODIUM AND TAZOBACTAM SODIUM 3375 MG: 3; .375 INJECTION, POWDER, LYOPHILIZED, FOR SOLUTION INTRAVENOUS at 19:25

## 2021-11-23 RX ADMIN — PIPERACILLIN SODIUM AND TAZOBACTAM SODIUM 3375 MG: 3; .375 INJECTION, POWDER, LYOPHILIZED, FOR SOLUTION INTRAVENOUS at 09:41

## 2021-11-23 RX ADMIN — PREDNISONE 5 MG: 5 TABLET ORAL at 09:42

## 2021-11-23 RX ADMIN — Medication 10 ML: at 21:56

## 2021-11-23 RX ADMIN — IPRATROPIUM BROMIDE AND ALBUTEROL SULFATE 1 AMPULE: .5; 2.5 SOLUTION RESPIRATORY (INHALATION) at 06:39

## 2021-11-23 RX ADMIN — ACYCLOVIR SODIUM 700 MG: 50 INJECTION, SOLUTION INTRAVENOUS at 21:55

## 2021-11-23 RX ADMIN — ALLOPURINOL 100 MG: 100 TABLET ORAL at 09:42

## 2021-11-23 RX ADMIN — BUDESONIDE 500 MCG: 0.5 INHALANT RESPIRATORY (INHALATION) at 06:39

## 2021-11-23 RX ADMIN — HEPARIN SODIUM 5000 UNITS: 5000 INJECTION INTRAVENOUS; SUBCUTANEOUS at 15:54

## 2021-11-23 RX ADMIN — PIPERACILLIN SODIUM AND TAZOBACTAM SODIUM 3375 MG: 3; .375 INJECTION, POWDER, LYOPHILIZED, FOR SOLUTION INTRAVENOUS at 01:41

## 2021-11-23 RX ADMIN — ARFORMOTEROL TARTRATE 15 MCG: 15 SOLUTION RESPIRATORY (INHALATION) at 06:39

## 2021-11-23 RX ADMIN — ATORVASTATIN CALCIUM 10 MG: 10 TABLET, FILM COATED ORAL at 09:42

## 2021-11-23 RX ADMIN — BUDESONIDE 500 MCG: 0.5 INHALANT RESPIRATORY (INHALATION) at 18:31

## 2021-11-23 RX ADMIN — FOLIC ACID 1 MG: 1 TABLET ORAL at 09:42

## 2021-11-23 RX ADMIN — SODIUM CHLORIDE: 9 INJECTION, SOLUTION INTRAVENOUS at 06:28

## 2021-11-23 RX ADMIN — Medication 10 ML: at 09:43

## 2021-11-23 RX ADMIN — LEVOTHYROXINE SODIUM 50 MCG: 0.05 TABLET ORAL at 06:10

## 2021-11-23 RX ADMIN — HEPARIN SODIUM 5000 UNITS: 5000 INJECTION INTRAVENOUS; SUBCUTANEOUS at 21:56

## 2021-11-23 RX ADMIN — IPRATROPIUM BROMIDE AND ALBUTEROL SULFATE 1 AMPULE: .5; 2.5 SOLUTION RESPIRATORY (INHALATION) at 10:25

## 2021-11-23 RX ADMIN — ACYCLOVIR SODIUM 700 MG: 50 INJECTION, SOLUTION INTRAVENOUS at 15:54

## 2021-11-23 RX ADMIN — ARFORMOTEROL TARTRATE 15 MCG: 15 SOLUTION RESPIRATORY (INHALATION) at 18:31

## 2021-11-23 ASSESSMENT — PAIN SCALES - GENERAL
PAINLEVEL_OUTOF10: 0

## 2021-11-23 ASSESSMENT — PAIN SCALES - WONG BAKER: WONGBAKER_NUMERICALRESPONSE: 0

## 2021-11-23 NOTE — PROGRESS NOTES
OT BEDSIDE TREATMENT NOTE      Date:2021  Patient Name: Paul Pearl  MRN: [id number]  : 1955  Room: [address] [place] [address]        Evaluating OT: ASHLEY Saleh/GERMAN; [id number]        Referring Provider and Orders/Date:     OT eval and treat  Start:  21 1200,   End:  21 1200,   ONE TIME,   Standing Count:  1 Occurrences,   R         Zoya Neighbours, APRN - CNP               Diagnosis:   1. Sepsis, due to unspecified organism, unspecified whether acute organ dysfunction present ([facility] [address]    2.  Urinary tract infection without hematuria, site unspecified          Pertinent Medical History:        Past Medical History        Past Medical History:   Diagnosis Date    Abnormal EKG       left bundle branch block    Acute gout involving toe of right foot 9/3/2020    Acute gout involving toe of right foot 9/3/2020    Acute on chronic combined systolic (congestive) and diastolic (congestive) heart failure (HCC)      Cancer (HCC)       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    Cerebrovascular disease      Clotting disorder ([facility] [address] 1985     thrombophlebitis after c section delivery    Depression       15 years followed by dr Akira Holland Hyperlipidemia      Hypertension      Hyperthyroidism      Leg swelling 9/3/2020    Lymphedema of both lower extremities 9/3/2020    Peripheral vascular disease ([facility] [address]      Renal failure 2012     renal transplant    Thrombophlebitis       after c section delivery    Thyroid disease               Past Surgical History         Past Surgical History:   Procedure Laterality Date     SECTION   1985     one normal delivery    COLONOSCOPY        DIAGNOSTIC CARDIAC CATH LAB PROCEDURE        normal coronaries and  normal coronaries    DIALYSIS FISTULA CREATION   march and 2012     phase one and two patient will start dialysis when needed   [place]     transfemoral venous bypass grafts    KIDNEY TRANSPLANT    [facility]   2015    KIDNEY TRANSPLANT   2015    PARATHYROIDECTOMY   2006     left parathyroid  implant and parathyroidectomy           Precautions:  Fall Risk, 2L, confusion    Recommended placement: subacute    Assessment of current deficits     [x]? Functional mobility           [x]? ADLs           [x]? Strength                   [x]? Cognition     [x]? Functional transfers         [x]? IADLs         [x]? Safety Awareness   [x]? Endurance     []? Fine Coordination                        [x]? Balance      []? Vision/perception    []? Sensation       [x]? Gross Motor Coordination            []? ROM           []?  Delirium                   []? Motor Control      OT PLAN OF CARE   OT POC based on physician orders, patient diagnosis and results of clinical assessment     Frequency/Duration 1-3 days/wk for 2 weeks PRN   Specific OT Treatment Interventions to include:   * Instruction/training on adapted ADL techniques and AE recommendations to increase functional independence within precautions       * Training on energy conservation strategies, correct breathing pattern and techniques to improve independence/tolerance for self-care routine  * Functional transfer/mobility training/DME recommendations for increased independence, safety, and fall prevention  * Patient/Family education to increase follow through with safety techniques and functional independence  * Recommendation of environmental modifications for increased safety with functional transfers/mobility and ADLs  * Cognitive retraining/development of therapeutic activities to improve problem solving, judgement, memory, and attention for increased safety/participation in ADL/IADL tasks  * Therapeutic exercise to improve motor endurance, ROM, and functional strength for ADLs/functional transfers  * Therapeutic activities to facilitate/challenge dynamic balance, stand tolerance for increased safety and independence with ADLs  * Therapeutic activities to facilitate gross/fine motor skills for increased independence with ADLs  * Neuro-muscular re-education: facilitation of righting/equilibrium reactions, midline orientation, scapular stability/mobility, normalization of muscle tone, and facilitation of volitional active controled movement  * Positioning to improve skin integrity, interaction with environment and functional independence      Recommended Adaptive Equipment/DME:  TBD       Home Living: Pt lives alone in a apartment 9th, elevator access  with No steps  to enter. Laundry on the 2nd floor with elevator access. Pt cares for mother as well that lives in her own home, but there is a lift to enter. Reports that she has family and friends to assist if needed. Bathroom setup: tub shower with grab bars, shower chair, standard toilet    DME owned: rollator      Prior Level of Function: indep with ADLs , indep with IADLs; ambulated indep    Driving: yes   Occupation: retired   Enjoys: [employer], casino with mother     Pain Level: no c/o pain at this time  Cognition: A&O: 2/4; oriented to self and location;   Follows 1step directions intermittently              Memory:  Fair-              Sequencing:  Fair-              Problem solving:  Poor+              Judgement/safety:  Poor+     AM-PAC Daily Activity Inpatient   How much help for putting on and taking off regular lower body clothing?: Total  How much help for Bathing?: A Lot  How much help for Toileting?: Total  How much help for putting on and taking off regular upper body clothing?: Total  How much help for taking care of personal grooming?: A Lot  How much help for eating meals?: A Little  AM-[facility] Inpatient Daily Activity Raw Score: 10  AM-PAC Inpatient ADL T-Scale Score : 27.31  ADL Inpatient CMS 0-100% Score: 74.7  ADL Inpatient CMS G-Code Modifier : CL                Functional Assessment:      Initial Eval Status  Date: 11/21/2021   Treatment Status  Date: 11/23/2021 STGs = LTGs  Time frame: 10-14 days   Feeding Minimal Assist physically to bring drinks to mouth to prevent spillage.  N/T Set up   Grooming Min Assist with oral care, face/hand wash and dep assist with hair wash and comb due to fatigue. Mod A  To don shampoo cap; pt required assist to bring B hands to head to wash hair; assist to towel dry and brush hair; pt able to wash face with verbal and visual cuing for follow through Stand by Assist    UB Dressing Dependent with gown management from supine Max A to change gowns, assist to thread B UE's through sleeves; assist to tie/untie back of gown; assist to adjust monitor, IV lines and O2 line through gown  Moderate Assist    LB Dressing Dependent to don dinah socks from supine Dep to don/doff heelbos Maximal Assist    Bathing Maximal Assist suspected with pt declining to complete on eval due to fatigue. Pt simulated ability to wash arms and chest.  Mod/Max Assist; pt able to wash UB when seated in bed; assist to lift B UE's to wash under arms; Max A for pericare and Max A to wash back and LE's   Minimal Assist    Toileting Dependent with hassan in place and need for bed pan Dep for hygiene d/t incontinence of bowel; Dep for side rolling to change chux pads and adjust linens; max A/Dep to scoot to [facility] with bed in trendelenburg position Maximal Assist    Bed Mobility  Supine to sit: Maximal Assist   Sit to supine: Maximal Assist  And moderate assist for rolling. Limited with confusion and fatigue at time of eval. Pt reported going for a test prior to therapist arrival that \"wiped me out\"  Max A for side rolling B ways to change linens, adjust chux pads and TAPS system and for hygiene Supine to sit: Minimal Assist   Sit to supine: Minimal Assist    Functional Transfers Moderate Assist with sit to stand with walker to attempt side stepping. Not safe at this time and pt sat with  Hips higher in bed instead.    N/T d/t decreased safety awareness and decreased tolerance for bed mobility Minimal Assist Functional Mobility  NT due to pt overall debility, decreased activity tolerance, balance deficits, safety and fall risk.      NT due to pt overall debility, decreased activity tolerance, balance deficits, safety and fall risk.   Moderate Assist    Balance Sitting:     Static:  Fair-    Dynamic:fair-  Standing: poor+ Sitting:     Static:  Fair-    Dynamic:fair-/poor  Standing: N/T Sitting:     Static:  fair    Dynamic:fair  Standing: fair-   Activity Tolerance Vitals with activity: 2 L with 93-95% throughout the eval/ADLs  Sitting tolerance EOB limited to <5min; standing <20secs Fair minus/poor  Increase standing tolerance for >2min with stable vital signs for carry over into toileting, functional tranfers and indep in ADLs   Visual/  Perceptual Glasses: not Present; WFL     Reports change in vision since admission: No      NA   Yair UE Strengthening  4-/5 generally   4+/5MMT generally for carry over into self care, functional transfers and functional mobility with AD.       Hand Dominance  [x]? Right   []? Left     AROM (PROM) Strength Additional Info:    RUE  WFL 4-/5 good  and fair FMC/dexterity noted during ADL tasks  Opposition [x]? Intact []? Impaired  Finger to nose [x]? Intact []? Impaired      LUE WFL 4-/5 good  and FMC/dexterity noted during ADL tasks  Opposition [x]? Intact []? Impaired  Finger to nose [x]? Intact []? Impaired    - BUE AAROM exercises: 15 reps in all planes of movement to increase ROM/endurance required for functional transfers/ADL participation. Exercises completed in shoulder and elbow flexion/extension, internal/external rotation, abduction/adduction, supination/pronation, digit and wrist flexion/extension. B MERVIN LOPEZ appears to be [facility] with assist and increased time. Min rest breaks provided 2* to decreased endurance/tolerance; Ex's completed when pt seated in bed with HOB elevated.      Hearing: WFL   Sensation:  No c/o numbness or tingling   Tone: WFL   Edema: B UE's, B LE's; nsg aware    Comments: Patient cleared by nursing staff. Upon arrival pt supine in bed. Pt agreeable to OT tx session. Pt educated with regards to bed mobility, hand placement, safety awareness,  ADL retraining,  grooming tasks, UE/LE bathing, LE/UE dressing, toileting/hygiene, ECT's, B UE ROM ex's. At end of session pt seated in bed with HOB slightly elevated  with all lines and tubes intact, call light within reach. Overall, pt demonstrated decreased independence and safety during completion of ADL/functional transfers/mobility tasks. Pt would benefit from continued skilled OT to increase safety and independence with completion of ADL/IADL tasks for functional independence and quality of life. Pt required cues and education as noted above for safe facilitation and completion of tasks. Therapist provided skilled monitoring of patient's response during treatment session. Prior to and at the end of session, environmental modifications /line management completed for patients safety and efficiency of treatment session. Overall, patient demonstrates moderate difficulties with completion of BADLs and IADLs. Factors contributing to these difficulties include bowel incontinence, decreased endurance, confusion, delayed processing and generalized weakness. As noted above, patient likely to benefit from further OT intervention to increase independence, safety, and overall quality of life. Treatment:     ? Bed mobility: Facilitated bed mobility with cues for proper body mechanics and sequencing to prepare for ADL completion. ? ADL completion: Self-care retraining for the above-mentioned ADLs; training on proper hand placement, safety technique, sequencing, and energy conservation techniques. ? Skilled positioning: Proper positioning to improve interaction with environment, overall functioning and for skin integrity of B heels  ?  Therapeutic Ex's: To increase B UE strength/ROM/endurance required for functional transfers and ADL participation. · Pt has made fair minus progress towards set goals   ·   OT 1-3x/week for 5-7 days during hospitalization       Treatment Time also includes thorough review of current medical information, gathering information on past medical history/social history and prior level of function, informal observation of tasks, assessment of data and education on plan of care and goals.     Treatment Time In: 2:51 PM     Treatment Time Out: 3:33 PM            Treatment Charges: Mins Units   ADL/Home [facility]     [id number]     Ther Ex                 [id number]     Manual Therapy    [id number]     Neuro Re-ed         [id number]     Orthotic manage/training                               [id number]     Non Billable Time     Total Timed Treatment 42 1 [facility], YANCEY/L #[id number]

## 2021-11-23 NOTE — PROGRESS NOTES
Progress Note    Admitting Date and Time: 11/17/2021  4:50 AM  Admit Dx: Sepsis due to urinary tract infection ([facility] [A41.9, N39.0]  Urinary tract infection without hematuria, site unspecified [N39.0]  Sepsis, due to unspecified organism, unspecified whether acute organ dysfunction present ([facility] [A41.9]    Subjective:    Patient is  On a bipap . She does wake up to answer questions appropriately . She is more alert    ROS: denies  n/v, HA unless stated above.      pregabalin  75 mg Oral QPM    piperacillin-tazobactam  3,375 mg IntraVENous Q8H    sodium chloride flush  5-40 mL IntraVENous 2 times per day    Arformoterol Tartrate  15 mcg Nebulization BID    budesonide  500 mcg Nebulization BID    acyclovir  10 mg/kg (Adjusted) IntraVENous Q12H    allopurinol  100 mg Oral Daily    [Held by provider] cloZAPine  50 mg Oral Nightly    cycloSPORINE modified  100 mg Oral BID    desvenlafaxine succinate  50 mg Oral QPM    docusate sodium  100 mg Oral BID    folic acid  1 mg Oral Daily    levothyroxine  50 mcg Oral Daily    [Held by provider] lisinopril  5 mg Oral Daily    metoprolol succinate  50 mg Oral Daily    predniSONE  5 mg Oral Daily    atorvastatin  10 mg Oral Daily    heparin (porcine)  5,000 Units SubCUTAneous 3 times per day    [Held by provider] mycophenolate  1,000 mg Oral BID    ipratropium-albuterol  1 ampule Inhalation Q4H WA     sodium chloride flush, 5-40 mL, PRN  sodium chloride, 25 mL, PRN  ondansetron, 4 mg, Q8H PRN   Or  ondansetron, 4 mg, Q6H PRN  acetaminophen, 650 mg, Q6H PRN   Or  acetaminophen, 650 mg, Q6H PRN  polyethylene glycol, 17 g, Daily PRN         Objective:    BP (!) 142/60   Pulse 89   Temp 98.7 °F (37.1 °C) (Infrared)   Resp 22   Ht 5' 2\" (1.575 m)   Wt 227 lb 3.2 oz (103.1 kg) Comment: bed scale  SpO2 99%   BMI 41.56 kg/m²     General Appearance:  Laying in bed with bipap on , no acute distress , more alert   Head: normocephalic and atraumatic  Eyes: pupils equal, round, and reactive to light, conjunctivae normal   Pulmonary/Chest:  Decrease air entry bilateral   Cardiovascular: regular rate, normal S1 and S2   Abdomen: soft, non-tender, distended, normal bowel sounds  Extremities: no cyanosis, no clubbing and trace  bilateral lower extremity edema  Neurologic: no cranial nerve deficit and speech normal, no focal deficit     Recent Labs     11/20/21  1432 11/20/21  1432 11/20/21  1607 11/21/21  0909 11/22/21  0921     --   --  141 142   K 6.1*   < > 7.2* 5.4* 4.6     --   --  107 105   CO2 25  --   --  22 25   BUN 54*  --   --  48* 41*   CREATININE 2.1*  --   --  1.3* 1.1*   GLUCOSE 103*  --   --  93 92   CALCIUM 9.4  --   --  9.9 10.5*    < > = values in this interval not displayed. Recent Labs     11/20/21  0928 11/21/21  0909 11/22/21  0921   ALKPHOS 51 49 53   PROT 5.9* 5.9* 6.3*   LABALBU 3.1* 2.8* 3.5   BILITOT 0.5 0.5 0.6   AST 34* 27 26   ALT 17 14 16       Recent Labs     11/20/21  0928 11/21/21  0904   WBC 9.0 8.5   RBC 3.61 3.63   HGB 11.2* 11.3*   HCT 38.2 38.6   .8* 106.3*   MCH 31.0 31.1   MCHC 29.3* 29.3*   RDW 14.4 14.2   * 126*   MPV 14.1* 13.8*           Radiology:   IR LUMBAR PUNCTURE FOR DIAGNOSIS   Final Result   Successful fluoroscopic-guided lumbar puncture. CT CHEST WO CONTRAST   Final Result   Motion artifact limits evaluation of some images. Allowing for this   limitation, the findings are as follows:      Trace pleural effusions. Mild dependent atelectasis. Cardiomegaly. Question of enlarged ascending aorta, though suboptimally evaluated due to   motion artifact. No evidence of pneumonia. Small hiatal hernia. XR CHEST (2 VW)   Final Result   New left CP angle opacity suspicious for pleural effusion. Differential   includes summation artifact.   No other pulmonary consolidation      Persistent cardiomegaly with slight vascular congestion versus summation   artifact from Gram positive cocci in clusters and Gram negative rods. Urine culture growing Klebsiella pneuomoniae. CT of the abd/pelvis showed perinephric stranding in transplant kidney. ID following. Continue Cefepime. 2. Acute respiratory failure with hypercapnia mainly :  She was requiring supplemental oxygen at 2 liters and is on room air at home. ABGs with pH 7.275->7.273 after wearing Bipap throughout the night and PCO2 64.8->64.8. Continue Bipap. More wake and alert , still on bipap , ABGS better , try oral intake   3.  viral meningitis ,Nuchal rigidity: csf , monocytes ? Viral meningitis . On  Acyclovir. 4. Acute on chronic systolic and diastolic heart failure:  Pro-BNP greater than 2000, Last Echo on 12/21/19 revealed an EF of 97-17%, stage I diastolic dysfunction. Chest xray with pulmonary vascular congestion. HFpEF   5. Bacteremia:  Blood cultures positive for Klebsiella pneumoniae and Staphylococcus coagulase-negative. ID following. Continue cefepime. Vanco x 1 on 11/19. 6. Acute on chronic kidney disease in a patient with a history of kidney transplan:  Kidney transplant in 2015. Creatinine was 1.7 on admission, trended up to 2.3 and down to 1.1 today . Nephrology following. Lisinopril on hold. 7. Bilateral lower extremity edema:  D dimer was 836. Ultrasound of the lower extremities negative for DVT. Unable to do a CTA of the chest due to elevated creatinine. VQ scan showed low probability for PE. 8. Depression: Continue home medications. 9. Elevated troponin level:  Troponin levels: 52->88->56. Cardiology was consulted and likely acute myocardial injury secondary to sepsis, bacteremia and acute renal failure. Outpatient ischemic evaluation. , echo good EF  10. Hyperparathyroidism and parathyroidectomy 2006:  TSH 0.376 and free T4 1.08.   11.  Hyperkalemia:  Potassium is better ,        Electronically signed by Lakisha Shields MD on 11/22/2021 at 8:02 PM

## 2021-11-23 NOTE — CONSULTS
PULMONARY MEDICINE CONSULT    Consult requested by: Dr Kaylynn Sung  Reason for consult: Hypercapnic respiratory failure    HPI  73year old woman with PMH as described below admitted to hospital for management of     Acute hypoxemic and hypercapnic respiratory failure and metabolic encephalopathy secondary sepsis. --There is evidence of pulmonary hypertension in her CT images  --O2 to keep sats > 90%  --CT chest is limited but demonstrate bibasilar infiltrates and a round area in RLL suggetive of a nodule, righ pleural effusion, cardiomegaly and enlarged pulmonary arteries. She is receiving antimicrobial therapy. --Continue bronchodilators    --The patient will need exercise oximetry prior to discharge  --Bronchodilators: Albuterol PRN  --Needs PSG to evaluate for JORGE  --Please refer to outpatient pulmonology as she will need follow up with CT scan in 6 weeks to ensure resolution of the infiltrates    Rest of supportive care as per primary team  Sepsis secondary to Klebsiella bacteremia and pyelonephritis  --Antibiotic regimen as per ID    Acute kidney injury with hyperkalemia secondary to Sepsis/prerenal physiology in a patient with CKD and prior renal transplant. --Avoid nephrotoxins and dose medications according GFR  --Nephrology consulting and managing immunosuppresants    CHF, type 2 AMI  --Management as per cardiology    Hypothyroidism  --Management with thyroid hormone supplements    DVT prophylaxis with   Goals of care: Full code     Due to clinical improvement, we will not follow daily. Please call intensivist on call if the patient's condition were to worsen.    Thank you for allowing us to participate in the care of Ms Digna Vivar    Review of systems  Unable to obtain accurate review systems due to altered mental status    HEENT: No head lesions, PERRL, EOMI, mouth without lesions, no nasal lesions, no cervical adenopathy palpated   Respiratory: Normal respiratory effort currently on 3 L per nasal cannula ,lungs with equal breath sounds diminished bilaterally, no adventitious sounds auscultated, no accessory muscle use   CV: Regular rate and rhythm, no murmurs, JVD, bilateral leg edema noted  Abdomen: Soft, non tender, + bowel sounds, no lesions   Skin: Adequate turgor, no rash, capillary refill <2 seconds   Extremities: Muscular strength 3/4 in 4 limbs, moves 4 limbs spontaneously, distal pulses present   Neurology: Somnolent, arouses with speech and tactile stimuli, oriented to name only, moves 4 limbs on command and spontaneously, equal sensation, no dysmetria, neck is supple, no meningitic signs present.           Past Medical History:   Diagnosis Date    Abnormal EKG     left bundle branch block    Acute gout involving toe of right foot 9/3/2020    Acute gout involving toe of right foot 9/3/2020    Acute on chronic combined systolic (congestive) and diastolic (congestive) heart failure (HCC)     Cancer (HCC)     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    Cerebrovascular disease     Clotting disorder ([facility] 1985    thrombophlebitis after c section delivery    Depression     15 years followed by dr Jojo Crzu    Hyperlipidemia     Hypertension     Hyperthyroidism     Leg swelling 9/3/2020    Lymphedema of both lower extremities 9/3/2020    Peripheral vascular disease ([facility]     Renal failure 11/2012    renal transplant    Thrombophlebitis     after c section delivery    Thyroid disease      Family History   Problem Relation Age of Onset    Diabetes Mother     Diabetes Father     Dementia Father      Social History     Socioeconomic History    Marital status:      Spouse name: Not on file    Number of children: Not on file    Years of education: Not on file    Highest education level: Not on file   Occupational History    Not on file   Tobacco Use    Smoking status: Former Smoker     Packs/day: 1.00     Years: 20.00     Pack years: 20.00     Quit date: 10/1/2015     Years since quittin.1    Smokeless tobacco: Never Used   Vaping Use    Vaping Use: Never used   Substance and Sexual Activity    Alcohol use: No     Comment: 2 cups coffee per day    Drug use: No    Sexual activity: Not on file   Other Topics Concern    Not on file   Social History Narrative    Not on file     Social Determinants of Health     Financial Resource Strain:     Difficulty of Paying Living Expenses: Not on file   Food Insecurity:     Worried About Running Out of Food in the Last Year: Not on file    Vannessa of Food in the Last Year: Not on file   Transportation Needs:     Lack of Transportation (Medical): Not on file    Lack of Transportation (Non-Medical):  Not on file   Physical Activity:     Days of Exercise per Week: Not on file    Minutes of Exercise per Session: Not on file   Stress:     Feeling of Stress : Not on file   Social Connections:     Frequency of Communication with Friends and Family: Not on file    Frequency of Social Gatherings with Friends and Family: Not on file    Attends Tenriism Services: Not on file    Active Member of Clubs or Organizations: Not on file    Attends Club or Organization Meetings: Not on file    Marital Status: Not on file   Intimate Partner Violence:     Fear of Current or Ex-Partner: Not on file    Emotionally Abused: Not on file    Physically Abused: Not on file    Sexually Abused: Not on file   Housing Stability:     Unable to Pay for Housing in the Last Year: Not on file    Number of [place] in the Last Year: Not on file    Unstable Housing in the Last Year: Not on file     Current Facility-Administered Medications   Medication Dose Route Frequency Provider Last Rate Last Admin    pregabalin (LYRICA) capsule 75 mg  75 mg Oral QPM Susannah Ramirez MD   75 mg at 21 2083    piperacillin-tazobactam (ZOSYN) 3,375 mg in dextrose 5 % 50 mL IVPB extended infusion (mini-bag)  3,375 mg IntraVENous Q8H Cynthia Benitez MD 12.5 mL/hr at 11/22/21 1737 3,375 mg at 11/22/21 1737    0.9 % sodium chloride infusion   IntraVENous Q8H Goldy Coreas MD   Stopped at 11/22/21 1544    sodium chloride flush 0.9 % injection 5-40 mL  5-40 mL IntraVENous 2 times per day \[facility]\""magaly, APRN - CNP   10 mL at 11/22/21 0849    sodium chloride flush 0.9 % injection 5-40 mL  5-40 mL IntraVENous PRN [facility] Leo, APRN - CNP        0.9 % sodium chloride infusion  25 mL IntraVENous PRN [facility] Timmering, APRN -  mL/hr at 11/22/21 0850 25 mL at 11/22/21 0850    Arformoterol Tartrate (BROVANA) nebulizer solution 15 mcg  15 mcg Nebulization BID [facility], APRN - CNP   15 mcg at 11/22/21 1853    budesonide (PULMICORT) nebulizer suspension 500 mcg  500 mcg Nebulization BID [facility], APRN - CNP   500 mcg at 11/22/21 1853    acyclovir (ZOVIRAX) 700 mg in dextrose 5 % 100 mL IVPB  10 mg/kg (Adjusted) IntraVENous Q12H [facility] Leo, APRN - CNP   Stopped at 11/22/21 1047    0.9 % sodium chloride infusion   IntraVENous Continuous Alberto Plata MD 35 mL/hr at 11/22/21 0154 New Bag at 11/22/21 0154    allopurinol (ZYLOPRIM) tablet 100 mg  100 mg Oral Daily \[facility]\""nicaing, APRN - CNP   100 mg at 11/22/21 0849    [Held by provider] cloZAPine (CLOZARIL) tablet 50 mg  50 mg Oral Nightly \[facility]\""magaly, APRN - CNP   50 mg at 11/19/21 2213    cycloSPORINE modified (NEORAL) capsule 100 mg  100 mg Oral BID \[facility]\""nicaing, APRN - CNP   100 mg at 11/22/21 0849    desvenlafaxine succinate (PRISTIQ) extended release tablet 50 mg  50 mg Oral QPM [facility] Leo, APRN - CNP   50 mg at 11/22/21 1737    docusate sodium (COLACE) capsule 100 mg  100 mg Oral BID \[facility]\""nicaing, APRN - CNP   100 mg at 09/02/93 5702    folic acid (FOLVITE) tablet 1 mg  1 mg Oral Daily KATINA Gonzalez CNP   1 mg at 11/22/21 0849    levothyroxine (SYNTHROID) tablet 50 mcg  50 mcg Oral Daily KATINA Gonzalez - CNP   50 mcg at 11/22/21 6614    [Held by provider] lisinopril (PRINIVIL;ZESTRIL) tablet 5 mg  5 mg Oral Daily KATINA Campo CNP   5 mg at 11/17/21 1430    metoprolol succinate (TOPROL XL) extended release tablet 50 mg  50 mg Oral Daily KATINA Campo CNP   50 mg at 11/22/21 0849    predniSONE (DELTASONE) tablet 5 mg  5 mg Oral Daily KATINA Campo CNP   5 mg at 11/22/21 0849    atorvastatin (LIPITOR) tablet 10 mg  10 mg Oral Daily KATINA Campo CNP   10 mg at 11/22/21 0849    ondansetron (ZOFRAN-ODT) disintegrating tablet 4 mg  4 mg Oral Q8H PRN KATINA Campo CNP        Or    ondansetron [facility]) injection 4 mg  4 mg IntraVENous Q6H PRN KATINA Campo CNP        acetaminophen (TYLENOL) tablet 650 mg  650 mg Oral Q6H PRN KATINA Campo CNP   650 mg at 11/17/21 2005    Or    acetaminophen (TYLENOL) suppository 650 mg  650 mg Rectal Q6H PRN KATINA Campo CNP        polyethylene glycol (GLYCOLAX) packet 17 g  17 g Oral Daily PRN KATINA Campo CNP        heparin (porcine) injection 5,000 Units  5,000 Units SubCUTAneous 3 times per day KATINA Campo CNP   5,000 Units at 11/22/21 3286    [Held by provider] mycophenolate (CELLCEPT) capsule 1,000 mg  1,000 mg Oral BID KATINA Campo CNP   1,000 mg at 11/19/21 2213    ipratropium-albuterol (DUONEB) nebulizer solution 1 ampule  1 ampule Inhalation Q4H WA KATINA Campo CNP   1 ampule at 11/22/21 1853     MEDICATIONS:   pregabalin  75 mg Oral QPM    piperacillin-tazobactam  3,375 mg IntraVENous Q8H    sodium chloride flush  5-40 mL IntraVENous 2 times per day    Arformoterol Tartrate  15 mcg Nebulization BID    budesonide  500 mcg Nebulization BID    acyclovir  10 mg/kg (Adjusted) IntraVENous Q12H    allopurinol  100 mg Oral Daily    [Held by provider] cloZAPine  50 mg Oral Nightly    cycloSPORINE modified  100 mg Oral BID    desvenlafaxine succinate  50 mg Oral QPM    docusate sodium  100 mg 4.6 3.5 - 5.0 mmol/L Final   11/21/2021 5.4 (H) 3.5 - 5.0 mmol/L Final   11/20/2021 7.2 (HH) 3.5 - 5.0 mmol/L Final     Potassium reflex Magnesium   Date Value Ref Range Status   12/17/2019 5.2 (H) 3.5 - 5.0 mmol/L Final     Comment:     Specimen is moderately Hemolyzed. Result may be artificially increased.      Chloride   Date Value Ref Range Status   11/22/2021 105 98 - 107 mmol/L Final   11/21/2021 107 98 - 107 mmol/L Final   11/20/2021 104 98 - 107 mmol/L Final     CO2   Date Value Ref Range Status   11/22/2021 25 22 - 29 mmol/L Final   11/21/2021 22 22 - 29 mmol/L Final   11/20/2021 25 22 - 29 mmol/L Final     BUN   Date Value Ref Range Status   11/22/2021 41 (H) 6 - 23 mg/dL Final   11/21/2021 48 (H) 6 - 23 mg/dL Final   11/20/2021 54 (H) 6 - 23 mg/dL Final     CREATININE   Date Value Ref Range Status   11/22/2021 1.1 (H) 0.5 - 1.0 mg/dL Final   11/21/2021 1.3 (H) 0.5 - 1.0 mg/dL Final   11/20/2021 2.1 (H) 0.5 - 1.0 mg/dL Final     Glucose   Date Value Ref Range Status   11/22/2021 92 74 - 99 mg/dL Final   11/21/2021 93 74 - 99 mg/dL Final   11/20/2021 103 (H) 74 - 99 mg/dL Final     Calcium   Date Value Ref Range Status   11/22/2021 10.5 (H) 8.6 - 10.2 mg/dL Final   11/21/2021 9.9 8.6 - 10.2 mg/dL Final   11/20/2021 9.4 8.6 - 10.2 mg/dL Final     Total Protein   Date Value Ref Range Status   11/22/2021 6.3 (L) 6.4 - 8.3 g/dL Final   11/21/2021 5.9 (L) 6.4 - 8.3 g/dL Final   11/20/2021 5.9 (L) 6.4 - 8.3 g/dL Final     Albumin   Date Value Ref Range Status   11/22/2021 3.5 3.5 - 5.2 g/dL Final   11/21/2021 2.8 (L) 3.5 - 5.2 g/dL Final   11/20/2021 3.1 (L) 3.5 - 5.2 g/dL Final     Total Bilirubin   Date Value Ref Range Status   11/22/2021 0.6 0.0 - 1.2 mg/dL Final   11/21/2021 0.5 0.0 - 1.2 mg/dL Final   11/20/2021 0.5 0.0 - 1.2 mg/dL Final     Alkaline Phosphatase   Date Value Ref Range Status   11/22/2021 53 35 - 104 U/L Final   11/21/2021 49 35 - 104 U/L Final   11/20/2021 51 35 - 104 U/L Final     AST   Date Value Ref Range Status   11/22/2021 26 0 - 31 U/L Final   11/21/2021 27 0 - 31 U/L Final     Comment:     Specimen is slightly Hemolyzed. Result may be artificially increased. 11/20/2021 34 (H) 0 - 31 U/L Final     ALT   Date Value Ref Range Status   11/22/2021 16 0 - 32 U/L Final   11/21/2021 14 0 - 32 U/L Final   11/20/2021 17 0 - 32 U/L Final     GFR Non-   Date Value Ref Range Status   11/22/2021 50 >=60 mL/min/1.73 Final     Comment:     Chronic Kidney Disease: less than 60 ml/min/1.73 sq.m. Kidney Failure: less than 15 ml/min/1.73 sq.m. Results valid for patients 18 years and older. 11/21/2021 41 >=60 mL/min/1.73 Final     Comment:     Chronic Kidney Disease: less than 60 ml/min/1.73 sq.m. Kidney Failure: less than 15 ml/min/1.73 sq.m. Results valid for patients 18 years and older. 11/20/2021 24 >=60 mL/min/1.73 Final     Comment:     Chronic Kidney Disease: less than 60 ml/min/1.73 sq.m. Kidney Failure: less than 15 ml/min/1.73 sq.m. Results valid for patients 18 years and older. GFR    Date Value Ref Range Status   11/22/2021 >60  Final   11/21/2021 50  Final   11/20/2021 29  Final     Magnesium   Date Value Ref Range Status   11/21/2021 2.3 1.6 - 2.6 mg/dL Final   11/20/2021 2.4 1.6 - 2.6 mg/dL Final   11/19/2021 2.4 1.6 - 2.6 mg/dL Final     Phosphorus   Date Value Ref Range Status   11/21/2021 2.2 (L) 2.5 - 4.5 mg/dL Final   11/20/2021 3.8 2.5 - 4.5 mg/dL Final   11/19/2021 4.4 2.5 - 4.5 mg/dL Final     Recent Labs     11/22/21  1401   PH 7.411   PO2 85.3   PCO2 41.6   HCO3 25.8   BE 1.1   O2SAT 96.2       RADIOLOGY:  IR LUMBAR PUNCTURE FOR DIAGNOSIS   Final Result   Successful fluoroscopic-guided lumbar puncture. CT CHEST WO CONTRAST   Final Result   Motion artifact limits evaluation of some images. Allowing for this   limitation, the findings are as follows:      Trace pleural effusions.       Mild dependent atelectasis. Cardiomegaly. Question of enlarged ascending aorta, though suboptimally evaluated due to   motion artifact. No evidence of pneumonia. Small hiatal hernia. XR CHEST (2 VW)   Final Result   New left CP angle opacity suspicious for pleural effusion. Differential   includes summation artifact. No other pulmonary consolidation      Persistent cardiomegaly with slight vascular congestion versus summation   artifact from large body habitus         NM LUNG VENT/PERFUSION (VQ)   Final Result   Low probability for pulmonary embolus. US DUP LOWER EXTREMITIES BILATERAL VENOUS   Final Result   No evidence of DVT in either lower extremity. CT ABDOMEN PELVIS WO CONTRAST Additional Contrast? None   Final Result   Limited evaluation for pyelonephritis given lack of IV contrast.  Allowing   for this limitation, the findings are as follows: The transplant kidney demonstrates perinephric stranding, which can be seen   in the setting of infection/inflammation. No hydronephrosis. Atrophic native kidneys with multiple cysts. Recommend nonemergent renal   ultrasound for further evaluation of an intermediate density structure in the   right kidney. Mildly dilated loops of proximal small bowel are noted, with decompressed   distal small bowel but no discrete transition point. This is favored to   reflect ileus, however obstruction cannot be excluded. Recommend monitoring   of bowel function and follow-up KUBs. Cardiomegaly. US RETROPERITONEAL COMPLETE   Final Result   TRANSPLANT KIDNEY: No evidence of hydronephrosis. Unremarkable echogenicity. NATIVE KIDNEYS: Findings of chronic medical renal disease. No evidence of   hydronephrosis. Small left renal cysts. XR CHEST (2 VW)   Final Result   Mild pulmonary vascular congestion. XR CHEST 1 VIEW   Final Result   Mild cardiomegaly.            PROBLEM LIST:  Principal Problem:    Sepsis secondary to UTI [facility])  Active Problems:    Depression    Hypertension    Acute kidney injury superimposed on CKD ([facility] [address]    Thyroid disease    Acute on chronic combined systolic (congestive) and diastolic (congestive) heart failure (HCC)  Resolved Problems:    * No resolved hospital problems.  *        Hernan García MD  Pulmonary and Critical Care Medicine

## 2021-11-23 NOTE — PROGRESS NOTES
Progress Note    Admitting Date and Time: 11/17/2021  4:50 AM  Admit Dx: Sepsis due to urinary tract infection ([facility] [A41.9, N39.0]  Urinary tract infection without hematuria, site unspecified [N39.0]  Sepsis, due to unspecified organism, unspecified whether acute organ dysfunction present ([facility] [A41.9]    Subjective:    Patient is  On o2 NC  . She does wake up  On calling her , but doesn't open her eyes , and confused ,  Repeats the answers     ROS: denies  n/v, HA unless stated above.      piperacillin-tazobactam  3,375 mg IntraVENous Q8H    sodium chloride flush  5-40 mL IntraVENous 2 times per day    Arformoterol Tartrate  15 mcg Nebulization BID    budesonide  500 mcg Nebulization BID    acyclovir  10 mg/kg (Adjusted) IntraVENous Q12H    allopurinol  100 mg Oral Daily    [Held by provider] cloZAPine  50 mg Oral Nightly    cycloSPORINE modified  100 mg Oral BID    desvenlafaxine succinate  50 mg Oral QPM    docusate sodium  100 mg Oral BID    folic acid  1 mg Oral Daily    levothyroxine  50 mcg Oral Daily    [Held by provider] lisinopril  5 mg Oral Daily    metoprolol succinate  50 mg Oral Daily    predniSONE  5 mg Oral Daily    atorvastatin  10 mg Oral Daily    heparin (porcine)  5,000 Units SubCUTAneous 3 times per day    ipratropium-albuterol  1 ampule Inhalation Q4H WA     sodium chloride flush, 5-40 mL, PRN  sodium chloride, 25 mL, PRN  ondansetron, 4 mg, Q8H PRN   Or  ondansetron, 4 mg, Q6H PRN  acetaminophen, 650 mg, Q6H PRN   Or  acetaminophen, 650 mg, Q6H PRN  polyethylene glycol, 17 g, Daily PRN         Objective:    BP (!) 159/79   Pulse 84   Temp 98.4 °F (36.9 °C) (Oral)   Resp 19   Ht 5' 2\" (1.575 m)   Wt 227 lb 3.2 oz (103.1 kg) Comment: bed scale  SpO2 95%   BMI 41.56 kg/m²     General Appearance:  Laying in bed with o2 NC , very drowzy and confused   Head: normocephalic and atraumatic  Eyes: pupils equal, round, and reactive to light, conjunctivae normal Pulmonary/Chest:  Decrease air entry bilateral   Cardiovascular: regular rate, normal S1 and S2   Abdomen: soft, non-tender, distended, normal bowel sounds  Extremities: no cyanosis, no clubbing and trace  bilateral lower extremity edema  Neurologic: no cranial nerve deficit and speech normal, no focal deficit     Recent Labs     11/21/21  0909 11/22/21  0921 11/23/21  0858    142 138   K 5.4* 4.6 4.3    105 105   CO2 22 25 23   BUN 48* 41* 32*   CREATININE 1.3* 1.1* 1.1*   GLUCOSE 93 92 92   CALCIUM 9.9 10.5* 10.4*       Recent Labs     11/21/21  0909 11/22/21  0921 11/23/21  0858   ALKPHOS 49 53 51   PROT 5.9* 6.3* 6.2*   LABALBU 2.8* 3.5 3.0*   BILITOT 0.5 0.6 0.8   AST 27 26 19   ALT 14 16 15       Recent Labs     11/21/21  0904 11/23/21  0858   WBC 8.5 11.1   RBC 3.63 3.82   HGB 11.3* 11.9   HCT 38.6 39.6   .3* 103.7*   MCH 31.1 31.2   MCHC 29.3* 30.1*   RDW 14.2 13.9   * 147   MPV 13.8* 14.0*           Radiology:   IR LUMBAR PUNCTURE FOR DIAGNOSIS   Final Result   Successful fluoroscopic-guided lumbar puncture. CT CHEST WO CONTRAST   Final Result   Motion artifact limits evaluation of some images. Allowing for this   limitation, the findings are as follows:      Trace pleural effusions. Mild dependent atelectasis. Cardiomegaly. Question of enlarged ascending aorta, though suboptimally evaluated due to   motion artifact. No evidence of pneumonia. Small hiatal hernia. XR CHEST (2 VW)   Final Result   New left CP angle opacity suspicious for pleural effusion. Differential   includes summation artifact. No other pulmonary consolidation      Persistent cardiomegaly with slight vascular congestion versus summation   artifact from large body habitus         NM LUNG VENT/PERFUSION (VQ)   Final Result   Low probability for pulmonary embolus.          US DUP LOWER EXTREMITIES BILATERAL VENOUS   Final Result   No evidence of DVT in either lower extremity. CT ABDOMEN PELVIS WO CONTRAST Additional Contrast? None   Final Result   Limited evaluation for pyelonephritis given lack of IV contrast.  Allowing   for this limitation, the findings are as follows: The transplant kidney demonstrates perinephric stranding, which can be seen   in the setting of infection/inflammation. No hydronephrosis. Atrophic native kidneys with multiple cysts. Recommend nonemergent renal   ultrasound for further evaluation of an intermediate density structure in the   right kidney. Mildly dilated loops of proximal small bowel are noted, with decompressed   distal small bowel but no discrete transition point. This is favored to   reflect ileus, however obstruction cannot be excluded. Recommend monitoring   of bowel function and follow-up KUBs. Cardiomegaly. US RETROPERITONEAL COMPLETE   Final Result   TRANSPLANT KIDNEY: No evidence of hydronephrosis. Unremarkable echogenicity. NATIVE KIDNEYS: Findings of chronic medical renal disease. No evidence of   hydronephrosis. Small left renal cysts. XR CHEST (2 VW)   Final Result   Mild pulmonary vascular congestion. XR CHEST 1 VIEW   Final Result   Mild cardiomegaly. FL MODIFIED BARIUM SWALLOW W VIDEO    (Results Pending)       Assessment:  Principal Problem:    Sepsis secondary to UTI [facility])  Active Problems:    Depression    Hypertension    Acute kidney injury superimposed on CKD (HCC)    Thyroid disease    Acute on chronic combined systolic (congestive) and diastolic (congestive) heart failure (HCC)  Resolved Problems:    * No resolved hospital problems. *      Plan:  1. Sepsis secondary to Pyelonephritis/UTI:  WBC 13.5, Temp 101.7, heart rate 107, Urinalysis was positive for a UTI and blood cultures are positive for Gram positive cocci in clusters and klebsiella   Urine culture growing Klebsiella pneuomoniae.   CT of the abd/pelvis showed perinephric stranding in MD on 11/23/2021 at 6:34 PM

## 2021-11-23 NOTE — CARE COORDINATION
11/23/21 1504 CM note: NO COVID TESTING. Patient requesting SNF at discharge and her SNF choices are: 1) [facility] is reviewing. LVM stating per ID patient will need IV Zosyn for 2 weeks. WILL NEED LINE. IF accepted NO PRECERT NEEDED, WILL NEED SIGNED IGNA, AND DISCHARGE ORDER FOE CM/SW TO COMPLETE HENS. 2) [facility]- currently no beds available.  Electronically signed by Shonna Ellis RN on 11/23/2021 at 3:18 PM

## 2021-11-23 NOTE — PROGRESS NOTES
normal delivery    COLONOSCOPY      DIAGNOSTIC CARDIAC CATH LAB PROCEDURE  2006    normal coronaries and 1996 normal coronaries    DIALYSIS FISTULA CREATION  march and july 2012    phase one and two patient will start dialysis when needed   [address]    transfemoral venous bypass grafts    KIDNEY TRANSPLANT  2016    KIDNEY TRANSPLANT  2015    KIDNEY TRANSPLANT  2015    PARATHYROIDECTOMY  2006    left parathyroid  implant and parathyroidectomy       Family History   Problem Relation Age of Onset    Diabetes Mother     Diabetes Father     Dementia Father         reports that she quit smoking about 6 years ago. She has a 20.00 pack-year smoking history. She has never used smokeless tobacco. She reports that she does not drink alcohol and does not use drugs. Review of Systems:   Constitutional: no fevers , no chills , feels ok   Eyes: no eye pain , no itching , no drainage  Ears, nose, mouth, throat, and face: no ear ,nose pain , hearing is ok ,no nasal drainage   Respiratory: no sob ,no cough ,no wheezing . Cardiovascular: no chest pain , no palpitation ,no sob . Gastrointestinal: no nausea, vomiting , constipation , no abdominal pain . Genitourinary:no urinary retention , no burning , dysuria . No polyuria   Hematologic/lymphatic: no bleeding , no cougulation issues . Musculoskeletal:no joint pain , no swelling . Neurological: no headaches ,no weakness , no numbness . Endocrine: no thirst , no weight issues .      MEDS (scheduled):    pregabalin  75 mg Oral QPM    piperacillin-tazobactam  3,375 mg IntraVENous Q8H    sodium chloride flush  5-40 mL IntraVENous 2 times per day    Arformoterol Tartrate  15 mcg Nebulization BID    budesonide  500 mcg Nebulization BID    acyclovir  10 mg/kg (Adjusted) IntraVENous Q12H    allopurinol  100 mg Oral Daily    [Held by provider] cloZAPine  50 mg Oral Nightly    cycloSPORINE modified  100 mg Oral BID    desvenlafaxine succinate  50 mg Oral QPM    docusate sodium  100 mg Oral BID    folic acid  1 mg Oral Daily    levothyroxine  50 mcg Oral Daily    [Held by provider] lisinopril  5 mg Oral Daily    metoprolol succinate  50 mg Oral Daily    predniSONE  5 mg Oral Daily    atorvastatin  10 mg Oral Daily    heparin (porcine)  5,000 Units SubCUTAneous 3 times per day    [Held by provider] mycophenolate  1,000 mg Oral BID    ipratropium-albuterol  1 ampule Inhalation Q4H WA       MEDS (infusions):   sodium chloride Stopped (11/22/21 1544)    sodium chloride 25 mL (11/22/21 0850)    sodium chloride 35 mL/hr at 11/22/21 0154       MEDS (prn):  sodium chloride flush, sodium chloride, ondansetron **OR** ondansetron, acetaminophen **OR** acetaminophen, polyethylene glycol    PHYSICAL EXAM:     Patient Vitals for the past 24 hrs:   BP Temp Temp src Pulse Resp SpO2   11/22/21 1502 (!) 142/60 98.7 °F (37.1 °C) Infrared 89 22 99 %   11/22/21 1403 -- -- -- -- 25 --   11/22/21 0803 (!) 174/93 98.2 °F (36.8 °C) Axillary 86 24 97 %   11/22/21 0658 -- -- -- -- 22 --   11/22/21 0359 136/75 97.1 °F (36.2 °C) Infrared 86 17 98 %   11/21/21 2206 (!) 144/78 -- -- 81 -- --   11/21/21 2202 -- -- -- -- 19 --   @      Intake/Output Summary (Last 24 hours) at 11/22/2021 2040  Last data filed at 11/22/2021 1504  Gross per 24 hour   Intake 240 ml   Output 1450 ml   Net -1210 ml         Wt Readings from Last 3 Encounters:   11/17/21 227 lb 3.2 oz (103.1 kg)   08/16/21 229 lb (103.9 kg)   10/01/20 245 lb 8 oz (111.4 kg)       Constitutional:  in no acute distress  Oral: mucus membranes moist  Neck: no JVD  Cardiovascular: S1, S2 regular rhythm, no murmur,or rub  Respiratory:  No crackles, no wheeze  Gastrointestinal:  Soft, nontender, nondistended, NABS  Ext: no edema, feet warm  Skin: dry, no rash  Neuro: awake, alert, interactive      DATA:    Recent Labs     11/20/21 0928 11/21/21  0904   WBC 9.0 8.5   HGB 11.2* 11.3*   HCT 38.2 38.6   .8* 106.3*   * 126*     Recent Labs     11/20/21  0928 11/20/21  0928 11/20/21  1432 11/20/21  1432 11/20/21  1607 11/21/21  0904 11/21/21  0909 11/22/21  0921      < > 137  --   --   --  141 142   K 5.3*   < > 6.1*   < > 7.2*  --  5.4* 4.6      < > 104  --   --   --  107 105   CO2 25   < > 25  --   --   --  22 25   MG 2.4  --   --   --   --  2.3  --   --    PHOS 3.8  --   --   --   --  2.2*  --   --    BUN 51*   < > 54*  --   --   --  48* 41*   CREATININE 2.1*   < > 2.1*  --   --   --  1.3* 1.1*   ALT 17  --   --   --   --   --  14 16   AST 34*  --   --   --   --   --  27 26   BILITOT 0.5  --   --   --   --   --  0.5 0.6   ALKPHOS 51  --   --   --   --   --  49 53    < > = values in this interval not displayed. No results found for: LABPROT    ASSESSMENT / RECOMMENDATIONS:      1. Acute kidney injury felt to be related to volume depletion in  context of urosepsis. 2.  Chronic kidney disease, stage 2 in the patient with kidney  transplant. Baseline _____ function with a creatinine of 1.1 to 1.3.  3. Immunosuppressant status, chronically on CellCept 1 gm b.i.d.,  cyclosporine 100 mg b.i.d., and prednisone. 4.  Urosepsis with bacteremia management per ID service.     PLAN:      Cr down to 1.1 (close to baseline )  UO ok          -continue NS at conservative rate 35 cc/hr for nuritition purpose  -hold MMF for now in light of bacteremia .  Continue prograf and prednison   -encourage po intake   -PT/OT   -f/u serial bmp , UO         Electronically signed by Dipika Castillo MD on 11/22/2021 at 8:40 PM

## 2021-11-23 NOTE — PROGRESS NOTES
[facility]     [address] [place] [address]  [place], [facility] [address]  Phone [phone]   [phone]      Admit Date: 2021  4:50 AM  Pt Name: Monik Burgess  MRN: [id number]  : 1955  Reason for Consult:    Chief Complaint   Patient presents with    Urinary Tract Infection     Requesting Physician:  Mik Le MD  PCP: Mik Le MD  History Obtained From:  patient, chart   ID consulted for Sepsis due to urinary tract infection ([address] [A41.9, N39.0]  Urinary tract infection without hematuria, site unspecified [N39.0]  Sepsis, due to unspecified organism, unspecified whether acute organ dysfunction present ([address] [A41.9]  on hospital day 6   Face to face encounter   CHIEF COMPLAINT       Chief Complaint   Patient presents with    Urinary Tract Infection     HISTORYOF PRESENT ILLNESS   Monik Burgess is a 73 y.o. female who presents with   has a past medical history of Abnormal EKG, Acute gout involving toe of right foot, Acute gout involving toe of right foot, Acute on chronic combined systolic (congestive) and diastolic (congestive) heart failure ([address] Cancer ([address] Cerebrovascular disease, Clotting disorder ([address] Depression, Hyperlipidemia, Hypertension, Hyperthyroidism, Leg swelling, Lymphedema of both lower extremities, Peripheral vascular disease ([address] Renal failure, Thrombophlebitis, and Thyroid disease. Urinary Tract Infection        Patient admitted to hospital from home. She was weak, reports dysuria, and generalized weakness. She has been having fever and chills at home. She was hypoxic at home- 88 %. She has a cough. She was admitted for further evaluation. She is known to ID service- she was last seen in 2019-for a klebsiella UTI- was discharge with cefepime due to tachycardia and prolonged QT with cipro.      She is currently on IV cefepime  Her temp has been as high as 101.7   She has a cough - and is tachypnic     ID has been asked to evaluate and manage atbs    Current visit  Berna Baltazar  11/23/21   PT IN BED more awake on nc   afebrile  On 1L  CR1.1 WBC11.1    REVIEW OF SYSTEMS     CONSTITUTIONAL:   AS IN HPI   CURRENT MEDICATIONS     Current Facility-Administered Medications:     piperacillin-tazobactam (ZOSYN) 3,375 mg in dextrose 5 % 50 mL IVPB extended infusion (mini-bag), 3,375 mg, IntraVENous, Q8H, Rox Rubio MD, Last Rate: 12.5 mL/hr at 11/23/21 0941, 3,375 mg at 11/23/21 0941    0.9 % sodium chloride infusion, , IntraVENous, Q8H, Rox Rubio MD, Stopped at 11/22/21 1544    sodium chloride flush 0.9 % injection 5-40 mL, 5-40 mL, IntraVENous, 2 times per day, Kathlynn Lade, APRN - CNP, 10 mL at 11/23/21 0943    sodium chloride flush 0.9 % injection 5-40 mL, 5-40 mL, IntraVENous, PRN, Kathlynn Lade, APRN - CNP    0.9 % sodium chloride infusion, 25 mL, IntraVENous, PRN, Kathlynn Lade, APRN - CNP, Last Rate: 100 mL/hr at 11/22/21 0850, 25 mL at 11/22/21 0850    Arformoterol Tartrate (BROVANA) nebulizer solution 15 mcg, 15 mcg, Nebulization, BID, Kathlynn Lade, APRN - CNP, 15 mcg at 11/23/21 0639    budesonide (PULMICORT) nebulizer suspension 500 mcg, 500 mcg, Nebulization, BID, Kathlynn Lade, APRN - CNP, 500 mcg at 11/23/21 0504    acyclovir (ZOVIRAX) 700 mg in dextrose 5 % 100 mL IVPB, 10 mg/kg (Adjusted), IntraVENous, Q12H, Kathlynn Lade, APRN - CNP, Stopped at 11/22/21 2230    0.9 % sodium chloride infusion, , IntraVENous, Continuous, Dipika Castillo MD, Last Rate: 35 mL/hr at 11/23/21 0628, New Bag at 11/23/21 0628    allopurinol (ZYLOPRIM) tablet 100 mg, 100 mg, Oral, Daily, KATINA Elias CNP, 100 mg at 11/23/21 0942    [Held by provider] cloZAPine (CLOZARIL) tablet 50 mg, 50 mg, Oral, Nightly, KATINA Elias - CNP, 50 mg at 11/19/21 2213    cycloSPORINE modified (NEORAL) capsule 100 mg, 100 mg, Oral, BID, KATINA Elias - CNP, 100 mg at 11/23/21 0942   desvenlafaxine succinate (PRISTIQ) extended release tablet 50 mg, 50 mg, Oral, QPM, Fremont Belts, APRN - CNP, 50 mg at 11/22/21 1737    docusate sodium (COLACE) capsule 100 mg, 100 mg, Oral, BID, Fremont Belts, APRN - CNP, 100 mg at 40/69/70 3967    folic acid (FOLVITE) tablet 1 mg, 1 mg, Oral, Daily, Fremont Belts, APRN - CNP, 1 mg at 11/23/21 9094    levothyroxine (SYNTHROID) tablet 50 mcg, 50 mcg, Oral, Daily, Fremont Belts, APRN - CNP, 50 mcg at 11/23/21 0610    [Held by provider] lisinopril (PRINIVIL;ZESTRIL) tablet 5 mg, 5 mg, Oral, Daily, Fremont Belts, APRN - CNP, 5 mg at 11/17/21 1430    metoprolol succinate (TOPROL XL) extended release tablet 50 mg, 50 mg, Oral, Daily, Fremont Belts, APRN - CNP, 50 mg at 11/23/21 0942    predniSONE (DELTASONE) tablet 5 mg, 5 mg, Oral, Daily, Fremont Belts, APRN - CNP, 5 mg at 11/23/21 5712    atorvastatin (LIPITOR) tablet 10 mg, 10 mg, Oral, Daily, Fremont Belts, APRN - CNP, 10 mg at 11/23/21 0942    ondansetron (ZOFRAN-ODT) disintegrating tablet 4 mg, 4 mg, Oral, Q8H PRN **OR** ondansetron (ZOFRAN) injection 4 mg, 4 mg, IntraVENous, Q6H PRN, Fremont Nicolás, APRN - CNP    acetaminophen (TYLENOL) tablet 650 mg, 650 mg, Oral, Q6H PRN, 650 mg at 11/17/21 2005 **OR** acetaminophen (TYLENOL) suppository 650 mg, 650 mg, Rectal, Q6H PRN, Fremont Nicolás, APRN - CNP    polyethylene glycol (GLYCOLAX) packet 17 g, 17 g, Oral, Daily PRN, Fremont Belts, APRN - CNP    heparin (porcine) injection 5,000 Units, 5,000 Units, SubCUTAneous, 3 times per day, KATINA Goldstein CNP, 5,000 Units at 11/23/21 0610    ipratropium-albuterol (DUONEB) nebulizer solution 1 ampule, 1 ampule, Inhalation, Q4H WA, KATINA Goldstein CNP, 1 ampule at 11/23/21 1025  ALLERGIES     Macrodantin [nitrofurantoin macrocrystal] and Sulfa antibiotics  Immunization History   Administered Date(s) Administered    COVID-19, Pfizer, PF, 30mcg/0.3mL 01/28/2021, 02/18/2021, 08/18/2021      Internal Administration   First Dose COVID-19, JOHN Rubi, 30mcg/0.3mL  01/28/2021   Second Dose COVID-19, JOHN Rubi, 30mcg/0.3mL   02/18/2021       Last COVID Lab SARS-CoV-2 (no units)   Date Value   01/20/2021 Not Detected     SARS-CoV-2, PCR (no units)   Date Value   11/17/2021 Not Detected     SARS-CoV-2, NAAT (no units)   Date Value   11/17/2021 Not Detected        PAST MEDICAL HISTORY     Past Medical History:   Diagnosis Date    Abnormal EKG     left bundle branch block    Acute gout involving toe of right foot 9/3/2020    Acute gout involving toe of right foot 9/3/2020    Acute on chronic combined systolic (congestive) and diastolic (congestive) heart failure (HCC)     Cancer (HCC)     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    Cerebrovascular disease     Clotting disorder ([address] 1985    thrombophlebitis after c section delivery    Depression     15 years followed by dr Shahida Ruvalcaba    Hyperlipidemia     Hypertension     Hyperthyroidism     Leg swelling 9/3/2020    Lymphedema of both lower extremities 9/3/2020    Peripheral vascular disease ([address]     Renal failure 11/2012    renal transplant    Thrombophlebitis     after c section delivery    Thyroid disease      SURGICAL HISTORY       Past Surgical History:   Procedure Laterality Date   [address]    one normal delivery    COLONOSCOPY      DIAGNOSTIC CARDIAC CATH LAB PROCEDURE  2006    normal coronaries and 1996 normal coronaries    DIALYSIS FISTULA CREATION  march and july 2012    phase one and two patient will start dialysis when needed   [address]    transfemoral venous bypass grafts    KIDNEY TRANSPLANT  2016   [address] [facility]  2015   [address] [facility]  2015    PARATHYROIDECTOMY  2006    left parathyroid  implant and parathyroidectomy     PHYSICAL EXAM          Vitals:    11/23/21 0639 11/23/21 0815 11/23/21 1025 11/23/21 1117   BP:  (!) 159/79     Pulse:  84     Resp: 20 19 Temp:  98.4 °F (36.9 °C)     TempSrc:  Oral     SpO2: 95% 93% 92%    Weight:       Height:    5' 2\" (1.575 m)     Physical Exam   Constitutional/General:  1L in bed awake and  Head: NC/AT  Eyes: PERRL, EOMI  Pulmonary: Lungs DEC BS ANT  BIPAP  Cardiovascular:  S1/S2   Abdomen: Soft, + BS.    distension. Nontender. Extremities: Moves all extremities x 4.   ++ edema   Skin: Warm and dry  NO RASH   Neurologic:    No focal deficits  PIV  Chin yellow      DIAGNOSTIC RESULTS   RADIOLOGY:   XR CHEST (2 VW)    Result Date: 11/17/2021  EXAMINATION: TWO XRAY VIEWS OF THE CHEST 11/17/2021 9:31 am COMPARISON: None. HISTORY: ORDERING SYSTEM PROVIDED HISTORY: elevated BNP, wheezing TECHNOLOGIST PROVIDED HISTORY: Reason for exam:->elevated BNP, wheezing FINDINGS: There is prominence of the pulmonary vasculature. There is no effusion or pneumothorax. The cardiomediastinal silhouette is stable in size. The osseous structures are without acute process. Mild pulmonary vascular congestion. XR CHEST 1 VIEW    Result Date: 11/17/2021  EXAMINATION: ONE XRAY VIEW OF THE CHEST 11/17/2021 5:17 am COMPARISON: 2nd June 2020 HISTORY: ORDERING SYSTEM PROVIDED HISTORY: sepsis TECHNOLOGIST PROVIDED HISTORY: Reason for exam:->sepsis FINDINGS: Heart is mildly enlarged. Pulmonary vascularity is normal.  Lungs are clear. Neither costophrenic angle is blunted. Mild cardiomegaly.      LABS  Recent Labs     11/21/21  0904 11/23/21  0858   WBC 8.5 11.1   HGB 11.3* 11.9   HCT 38.6 39.6   .3* 103.7*   * 147     Recent Labs     11/21/21  0909 11/21/21  0909 11/22/21  0921 11/22/21  0921 11/23/21  0858     --  142  --  138   K 5.4*   < > 4.6   < > 4.3      < > 105   < > 105   CO2 22   < > 25   < > 23   BUN 48*  --  41*  --  32*   CREATININE 1.3*  --  1.1*  --  1.1*   GFRAA 50  --  >60  --  >60   LABGLOM 41  --  50  --  50   GLUCOSE 93  --  92  --  92   PROT 5.9*  --  6.3*  --  6.2*   LABALBU 2.8*  --  3.5  -- 3.0*   CALCIUM 9.9  --  10.5*  --  10.4*   BILITOT 0.5  --  0.6  --  0.8   ALKPHOS 49  --  53  --  51   AST 27  --  26  --  19   ALT 14  --  16  --  15    < > = values in this interval not displayed. Lab Results   Component Value Date    COVID19 Not Detected 11/17/2021     COVID-19/MARINA-COV2 LABS  Recent Labs     11/21/21  0909 11/22/21  0921 11/23/21  0858   AST 27 26 19   ALT 14 16 15     MICROBIOLOGY:     Cultures :   Lab Results   Component Value Date    BC 5 Days no growth 11/17/2021    BC  12/17/2019     Refer to previous culture of CXBLD from 12-17-19 @1120 for  susceptibility results      ORG Klebsiella pneumoniae ssp pneumoniae 11/17/2021    ORG Staphylococcus coagulase-negative 11/17/2021    ORG Klebsiella pneumoniae ssp pneumoniae 11/17/2021     Lab Results   Component Value Date    BLOODCULT2  11/17/2021     Gram stain performed from blood culture bottle media  Gram positive cocci in clusters  Gram negative rods      BLOODCULT2 Identification and sensitivity to follow 11/17/2021    BLOODCULT2  11/17/2021     This organism was isolated in one set. Susceptibility testing is not routinely done as this  organism frequently represents skin contamination. Additional testing can be ordered by calling the  Microbiology Department. ORG Klebsiella pneumoniae ssp pneumoniae 11/17/2021    ORG Staphylococcus coagulase-negative 11/17/2021    ORG Klebsiella pneumoniae ssp pneumoniae 11/17/2021     Urine Culture, Routine   Date Value Ref Range Status   11/17/2021 <10,000 CFU/mL  Mixed gram positive organisms   (A)  Final   11/17/2021 >100,000 CFU/ml  Final   04/09/2021   Final    10 to 100,000 CFU/mL  Mixed rubio isolated. Further workup and sensitivity testing  is not routinely indicated and will not be performed. Mixed rubio isolated includes:  Escherichia coli  Mixed gram positive organisms  Alpha hemolytic Strep species  Yeast  Nonhemolytic Strep species       FINAL IMPRESSION    Patient is a 73 y.o. female who presented with   Chief Complaint   Patient presents with    Urinary Tract Infection    and admitted for Sepsis due to urinary tract infection ([facility] [A41.9, N39.0]  Urinary tract infection without hematuria, site unspecified [N39.0]  Sepsis, due to unspecified organism, unspecified whether acute organ dysfunction present ([facility] [A41.9]  · Fevers BETTER  · Leukocytosis  BETTER  · UTI/ dysuria  MIXED GPO/KLEBSIELLA  · Gn septicemia/prob pyelonephritis   · History of kidney transplant   · Rule out viral infection   resp viral panel neg s/p LP neg  · Immunocompromised patient S/P LP SUGGEST ASEPTIC WITH 100 MONOCYTES  MRSA NEG   Plan:   piperacillin-tazobactam (ZOSYN) 3,375 mg in dextrose 5 % 50 mL IVPB extended infusion (mini-bag), Q8H til          CHECK LABS   F/U PROCAL   CHANGE TO PIPATZO plan 2 weeks  F/U BLOOD CX NGTD  NO CHANGE IN ATBX  Imaging and labs were reviewed. Thank you for involving me in the care of Riva Spatz. Please do not hesitate to call for any questions or concerns.     Electronically signed by Jade Ragland MD on 11/23/2021 at 11:52 AM    Phone [phone]  Fax [phone]

## 2021-11-23 NOTE — PROGRESS NOTES
Physical Therapy    Patient unavailable for therapy due to working with respiratory therapist. Will check back at a later time.     Sudhakar GRIFFIN

## 2021-11-23 NOTE — PROGRESS NOTES
SPEECH/LANGUAGE PATHOLOGY  CLINICAL ASSESSMENT OF SWALLOWING FUNCTION   and PLAN OF CARE    PATIENT NAME:  Jolinda Prader  (female)     MRN:  [id number]    :  1955  (73 y.o.)  STATUS:  Inpatient: Room 9336/4133-85    TODAY'S DATE:  2021  REFERRING PROVIDER:     SLP eval and treat Start: 21 1030, End: 21 1030, ONE TIME, Standing Count: 1 Occurrences, R    Jose Ramirez MD  REASON FOR REFERRAL: pocketing food    EVALUATING THERAPIST: CHAPARRITA Villanueva                 RESULTS:    DYSPHAGIA DIAGNOSIS:   Clinical indicators of moderate pharyngeal phase dysphagia       DIET RECOMMENDATIONS:  NPO until MBSS can be completed   MEDICATION ADMINISTRATION and Administer medication whole, ONE AT A TIME, with sips of water     FEEDING RECOMMENDATIONS:     Assistance level:  Full assistance is needed during all oral intake      Compensatory strategies recommended: No strategies are recommended at this time      Discussed recommendations with nursing and/or faxed report to referring provider: Yes    SPEECH THERAPY  PLAN OF CARE   The dysphagia POC is established based on physician order, dysphagia diagnosis and results of clinical assessment     Will establish POC once MBSS is completed. Conditions Requiring Skilled Therapeutic Intervention for dysphagia:    Throat clearing during PO intake     Specific dysphagia interventions to include:     Not applicable    Specific instructions for next treatment:  MBSS to be completed  Patient Treatment Goals:    Short Term Goals:  Pt will participate in MBSS to fully assess oropharyngeal swallow function and to assist in determining the least restrictive PO diet to maintain adequate nutrition/hydration     Long Term Goals:   Pt will improve oropharyngeal swallow function to ensure airway protection during PO intake to maintain adequate nutrition/hydration and decrease signs/symptoms of aspiration to less than 1 x/day.    OTHER RECOMMENDATIONS:  A Video Swallow Study (MBSS) is recommended and requires a physician order      Patient/family Goal:    Did not state. Will further assess during treatment. Plan of care discussed with Patient   The Patient did not demonstrate complete understanding of the diagnosis, prognosis and plan of care     Rehabilitation Potential/Prognosis: good                    ADMITTING DIAGNOSIS: Sepsis due to urinary tract infection ([facility] [A41.9, N39.0]  Urinary tract infection without hematuria, site unspecified [N39.0]  Sepsis, due to unspecified organism, unspecified whether acute organ dysfunction present ([facility] [A41.9]    VISIT DIAGNOSIS:   Visit Diagnoses       Codes    Sepsis, due to unspecified organism, unspecified whether acute organ dysfunction present ([facility]    -  Primary A41.9    Urinary tract infection without hematuria, site unspecified     N39.0           PATIENT REPORT/COMPLAINT: patient not able to report accurately     RN cleared patient for participation in assessment     yes     PRIOR LEVEL OF SWALLOW FUNCTION:    PAST HISTORY OF DYSPHAGIA?: none reported    Diet during hospital admission: Regular consistency solids (IDDSI level 7) with thin liquids (IDDSI level 0)    PROCEDURE:  Consistencies Administered During the Evaluation   Liquids: thin liquid   Solids:  Not given due to frequent throat clear with liquids       Method of Intake:   straw  Fed by clinician      Position:   Seated, upright    CLINICAL ASSESSMENT:  Oral Stage:        The oral stage of swallowing was within functional limits but only liquids given nursing reports increased pocketing with solids       Pharyngeal Stage:    Throat clearing present after presentation of thin liquid    Cognition:   Within functional limits for this exam    Oral Peripheral Examination   Generalized oral weakness    Current Respiratory Status    1 liters nasal cannula     Parameters of Speech Production  Respiration:  Adequate for speech production  Quality:   Within functional limits  Intensity: Within functional limits    Volitional Swallow: present     Volitional Cough:   present     Pain: No pain reported. EDUCATION:   The Speech Language Pathologist (SLP) completed education regarding results of evaluation and that intervention is warranted at this time. Learner: Patient  Education: Reviewed results and recommendations of this evaluation  Evaluation of Education:  Needs further instruction    This plan may be re-evaluated and revised as warranted. Evaluation Time includes thorough review of current medical information, gathering information on past medical history/social history and prior level of function, completion of standardized testing/informal observation of tasks, assessment of data and education on plan of care and goals. [x]The admitting diagnosis and active problem list, have been reviewed prior to initiation of this evaluation.         ACTIVE PROBLEM LIST:   Patient Active Problem List   Diagnosis    Peripheral vascular disease ([address]    Depression    Clotting disorder (HCC)    Abnormal EKG    Cancer ([address]    Over weight    S/p cadaver renal transplant    ESRD (end stage renal disease) ([address]    Non morbid obesity due to excess calories    Hypertension    Leg swelling    Lymphedema of both lower extremities    Acute gout involving toe of right foot    Sepsis secondary to UTI ([address]    Acute kidney injury superimposed on CKD ([address]    Thyroid disease    Acute on chronic combined systolic (congestive) and diastolic (congestive) heart failure ([address]         CPT code:  [address]  bedside swallow carolyn Cunningham MSCCC/SLP  Speech Language Pathologist  [id number]

## 2021-11-23 NOTE — PROGRESS NOTES
Comprehensive Nutrition Assessment    Type and Reason for Visit:  Initial, RD Nutrition Re-Screen/LOS    Nutrition Recommendations/Plan: Continue with diet and start ONS Ensure HP BID    Nutrition Assessment:  Pt at nutr'l risk 2/2 on-going poor p.o. intakes since admit. Pt admits w/ Dx sepsis 2/2 UTI w/PMH of ESRD, Renal transplant, lymphedema, CHF. Will start ONS BID and monitor    Malnutrition Assessment:  Malnutrition Status: At risk for malnutrition (Comment)    Context:  Chronic Illness     Findings of the 6 clinical characteristics of malnutrition:  Energy Intake:  Mild decrease in energy intake (Comment)  Weight Loss:  No significant weight loss     Body Fat Loss:  No significant body fat loss     Muscle Mass Loss:  No significant muscle mass loss    Fluid Accumulation:  No significant fluid accumulation     Strength:  Not Performed    Estimated Daily Nutrient Needs:  Energy (kcal):  ; Weight Used for Energy Requirements:  Current     Protein (g):  70-80 (x1.4-1.6gm/kg); Weight Used for Protein Requirements:  Ideal        Fluid (ml/day):  ; Method Used for Fluid Requirements:  1 ml/kcal      Nutrition Related Findings:  A/ox1, hypoactive BS, abd round/distended, BLE/BUE +1 non-pitting edema      Wounds:          Current Nutrition Therapies:    ADULT DIET; Regular; Low Fat/Low Chol/High Fiber/NAIN; Low Potassium (Less than 3000 mg/day)  ADULT ORAL NUTRITION SUPPLEMENT; Lunch, Dinner;  Low Calorie/High Protein Oral Supplement    Anthropometric Measures:  · Height: 5' 2\" (157.5 cm)  · Current Body Weight: 227 lb (103 kg)   · Usual Body Weight: 229 lb (103.9 kg) (8/16/21)     · Ideal Body Weight: 110 lbs; % Ideal Body Weight 206.4 %   · BMI: 41.5  · Adjusted Body Weight:  ; No Adjustment   · BMI Categories: Obese Class 3 (BMI 40.0 or greater)       Nutrition Diagnosis:   · Inadequate oral intake related to early satiety as evidenced by intake 0-25%      Nutrition Interventions:   Food and/or Nutrient Delivery:  Continue Current Diet, Start Oral Nutrition Supplement  Nutrition Education/Counseling:  Education not appropriate   Coordination of Nutrition Care:  Continue to monitor while inpatient    Goals:  Consume >50-75% meals/ONS       Nutrition Monitoring and Evaluation:   Behavioral-Environmental Outcomes:  None Identified   Food/Nutrient Intake Outcomes:  Food and Nutrient Intake, Supplement Intake  Physical Signs/Symptoms Outcomes:  Biochemical Data, Fluid Status or Edema, Nutrition Focused Physical Findings, Skin, Weight, Hemodynamic Status     Discharge Planning:     Too soon to determine     Electronically signed by Vania Augustin RD, LD on 11/23/21 at 11:49 AM EST    Contact: [phone]

## 2021-11-24 ENCOUNTER — APPOINTMENT (OUTPATIENT)
Dept: MRI IMAGING | Age: 66
DRG: 871 | End: 2021-11-24
Payer: MEDICARE

## 2021-11-24 ENCOUNTER — APPOINTMENT (OUTPATIENT)
Dept: GENERAL RADIOLOGY | Age: 66
DRG: 871 | End: 2021-11-24
Payer: MEDICARE

## 2021-11-24 LAB
(1,3)-BETA-D-GLUCAN (FUNGITELL) INTERPRETATION: NEGATIVE
(1,3)-BETA-D-GLUCAN (FUNGITELL): <31 PG/ML
ABSOLUTE CD 3: 463 CELLS/UL (ref 660–2200)
ABSOLUTE CD 4 HELPER: 209 CELLS/UL (ref 490–1600)
ABSOLUTE CD 8 (SUPP): 259 CELLS/UL (ref 150–1050)
ALBUMIN SERPL-MCNC: 3.1 G/DL (ref 3.5–5.2)
ALP BLD-CCNC: 57 U/L (ref 35–104)
ALT SERPL-CCNC: 14 U/L (ref 0–32)
ANION GAP SERPL CALCULATED.3IONS-SCNC: 9 MMOL/L (ref 7–16)
AST SERPL-CCNC: 16 U/L (ref 0–31)
BILIRUB SERPL-MCNC: 0.9 MG/DL (ref 0–1.2)
BUN BLDV-MCNC: 24 MG/DL (ref 6–23)
CALCIUM SERPL-MCNC: 10.6 MG/DL (ref 8.6–10.2)
CD4/CD8 RATIO: 0.81 RATIO (ref 0.8–6.17)
CHLORIDE BLD-SCNC: 105 MMOL/L (ref 98–107)
CO2: 27 MMOL/L (ref 22–29)
CREAT SERPL-MCNC: 1.2 MG/DL (ref 0.5–1)
CSF CULTURE: NORMAL
GFR AFRICAN AMERICAN: 54
GFR NON-AFRICAN AMERICAN: 45 ML/MIN/1.73
GLUCOSE BLD-MCNC: 95 MG/DL (ref 74–99)
GRAM STAIN RESULT: NORMAL
LYME, EIA: 0.18 LIV (ref 0–1.2)
LYMPH SUBSET INFORMATION: ABNORMAL
MYELIN BASIC PROTEIN, CSF: 1.21 NG/ML (ref 0–5.5)
POTASSIUM SERPL-SCNC: 4 MMOL/L (ref 3.5–5)
PROCALCITONIN: 0.28 NG/ML (ref 0–0.08)
SODIUM BLD-SCNC: 141 MMOL/L (ref 132–146)
TOTAL PROTEIN: 6.2 G/DL (ref 6.4–8.3)
VDRL CSF SCREEN: NON REACTIVE

## 2021-11-24 PROCEDURE — 92611 MOTION FLUOROSCOPY/SWALLOW: CPT | Performed by: SPEECH-LANGUAGE PATHOLOGIST

## 2021-11-24 PROCEDURE — 70551 MRI BRAIN STEM W/O DYE: CPT

## 2021-11-24 PROCEDURE — 36415 COLL VENOUS BLD VENIPUNCTURE: CPT

## 2021-11-24 PROCEDURE — 84145 PROCALCITONIN (PCT): CPT

## 2021-11-24 PROCEDURE — 2580000003 HC RX 258: Performed by: SPECIALIST

## 2021-11-24 PROCEDURE — 6360000002 HC RX W HCPCS: Performed by: NURSE PRACTITIONER

## 2021-11-24 PROCEDURE — 6370000000 HC RX 637 (ALT 250 FOR IP): Performed by: NURSE PRACTITIONER

## 2021-11-24 PROCEDURE — 36569 INSJ PICC 5 YR+ W/O IMAGING: CPT

## 2021-11-24 PROCEDURE — 2700000000 HC OXYGEN THERAPY PER DAY

## 2021-11-24 PROCEDURE — 94660 CPAP INITIATION&MGMT: CPT

## 2021-11-24 PROCEDURE — 2500000003 HC RX 250 WO HCPCS: Performed by: INTERNAL MEDICINE

## 2021-11-24 PROCEDURE — 2580000003 HC RX 258: Performed by: INTERNAL MEDICINE

## 2021-11-24 PROCEDURE — 6360000002 HC RX W HCPCS: Performed by: INTERNAL MEDICINE

## 2021-11-24 PROCEDURE — 2580000003 HC RX 258: Performed by: NURSE PRACTITIONER

## 2021-11-24 PROCEDURE — 05HB33Z INSERTION OF INFUSION DEVICE INTO RIGHT BASILIC VEIN, PERCUTANEOUS APPROACH: ICD-10-PCS | Performed by: INTERNAL MEDICINE

## 2021-11-24 PROCEDURE — 80053 COMPREHEN METABOLIC PANEL: CPT

## 2021-11-24 PROCEDURE — C1751 CATH, INF, PER/CENT/MIDLINE: HCPCS

## 2021-11-24 PROCEDURE — 6360000002 HC RX W HCPCS: Performed by: SPECIALIST

## 2021-11-24 PROCEDURE — 2720000010 HC SURG SUPPLY STERILE

## 2021-11-24 PROCEDURE — 74230 X-RAY XM SWLNG FUNCJ C+: CPT

## 2021-11-24 PROCEDURE — 94640 AIRWAY INHALATION TREATMENT: CPT

## 2021-11-24 PROCEDURE — 76937 US GUIDE VASCULAR ACCESS: CPT

## 2021-11-24 PROCEDURE — 1200000000 HC SEMI PRIVATE

## 2021-11-24 RX ORDER — SODIUM CHLORIDE 0.9 % (FLUSH) 0.9 %
5-40 SYRINGE (ML) INJECTION EVERY 12 HOURS SCHEDULED
Status: DISCONTINUED | OUTPATIENT
Start: 2021-11-24 | End: 2021-11-27 | Stop reason: HOSPADM

## 2021-11-24 RX ORDER — HEPARIN SODIUM (PORCINE) LOCK FLUSH IV SOLN 100 UNIT/ML 100 UNIT/ML
3 SOLUTION INTRAVENOUS EVERY 12 HOURS SCHEDULED
Status: DISCONTINUED | OUTPATIENT
Start: 2021-11-24 | End: 2021-11-27 | Stop reason: HOSPADM

## 2021-11-24 RX ORDER — LIDOCAINE HYDROCHLORIDE 10 MG/ML
5 INJECTION, SOLUTION EPIDURAL; INFILTRATION; INTRACAUDAL; PERINEURAL ONCE
Status: COMPLETED | OUTPATIENT
Start: 2021-11-24 | End: 2021-11-24

## 2021-11-24 RX ORDER — SODIUM CHLORIDE 0.9 % (FLUSH) 0.9 %
5-40 SYRINGE (ML) INJECTION PRN
Status: DISCONTINUED | OUTPATIENT
Start: 2021-11-24 | End: 2021-11-27 | Stop reason: HOSPADM

## 2021-11-24 RX ORDER — HEPARIN SODIUM (PORCINE) LOCK FLUSH IV SOLN 100 UNIT/ML 100 UNIT/ML
3 SOLUTION INTRAVENOUS PRN
Status: DISCONTINUED | OUTPATIENT
Start: 2021-11-24 | End: 2021-11-27 | Stop reason: HOSPADM

## 2021-11-24 RX ORDER — SODIUM CHLORIDE 9 MG/ML
25 INJECTION, SOLUTION INTRAVENOUS PRN
Status: DISCONTINUED | OUTPATIENT
Start: 2021-11-24 | End: 2021-11-27 | Stop reason: HOSPADM

## 2021-11-24 RX ADMIN — SODIUM CHLORIDE, PRESERVATIVE FREE 10 ML: 5 INJECTION INTRAVENOUS at 20:21

## 2021-11-24 RX ADMIN — METOPROLOL SUCCINATE 50 MG: 50 TABLET, EXTENDED RELEASE ORAL at 12:38

## 2021-11-24 RX ADMIN — IPRATROPIUM BROMIDE AND ALBUTEROL SULFATE 1 AMPULE: .5; 2.5 SOLUTION RESPIRATORY (INHALATION) at 18:07

## 2021-11-24 RX ADMIN — BUDESONIDE 500 MCG: 0.5 INHALANT RESPIRATORY (INHALATION) at 06:41

## 2021-11-24 RX ADMIN — ACYCLOVIR SODIUM 700 MG: 50 INJECTION, SOLUTION INTRAVENOUS at 23:47

## 2021-11-24 RX ADMIN — SODIUM CHLORIDE: 9 INJECTION, SOLUTION INTRAVENOUS at 04:43

## 2021-11-24 RX ADMIN — BARIUM SULFATE 45 ML: 400 SUSPENSION ORAL at 10:42

## 2021-11-24 RX ADMIN — HEPARIN SODIUM 5000 UNITS: 5000 INJECTION INTRAVENOUS; SUBCUTANEOUS at 20:21

## 2021-11-24 RX ADMIN — DESVENLAFAXINE 50 MG: 50 TABLET, EXTENDED RELEASE ORAL at 18:01

## 2021-11-24 RX ADMIN — FOLIC ACID 1 MG: 1 TABLET ORAL at 12:37

## 2021-11-24 RX ADMIN — BARIUM SULFATE 45 G: 0.6 CREAM ORAL at 10:43

## 2021-11-24 RX ADMIN — IPRATROPIUM BROMIDE AND ALBUTEROL SULFATE 1 AMPULE: .5; 2.5 SOLUTION RESPIRATORY (INHALATION) at 06:40

## 2021-11-24 RX ADMIN — PIPERACILLIN SODIUM AND TAZOBACTAM SODIUM 3375 MG: 3; .375 INJECTION, POWDER, LYOPHILIZED, FOR SOLUTION INTRAVENOUS at 22:13

## 2021-11-24 RX ADMIN — LIDOCAINE HYDROCHLORIDE 5 ML: 10 INJECTION, SOLUTION EPIDURAL; INFILTRATION; INTRACAUDAL at 15:01

## 2021-11-24 RX ADMIN — SODIUM CHLORIDE: 9 INJECTION, SOLUTION INTRAVENOUS at 19:37

## 2021-11-24 RX ADMIN — ACYCLOVIR SODIUM 700 MG: 50 INJECTION, SOLUTION INTRAVENOUS at 12:27

## 2021-11-24 RX ADMIN — HEPARIN SODIUM 5000 UNITS: 5000 INJECTION INTRAVENOUS; SUBCUTANEOUS at 05:18

## 2021-11-24 RX ADMIN — BUDESONIDE 500 MCG: 0.5 INHALANT RESPIRATORY (INHALATION) at 18:08

## 2021-11-24 RX ADMIN — ALLOPURINOL 100 MG: 100 TABLET ORAL at 12:25

## 2021-11-24 RX ADMIN — HEPARIN SODIUM 5000 UNITS: 5000 INJECTION INTRAVENOUS; SUBCUTANEOUS at 14:56

## 2021-11-24 RX ADMIN — ARFORMOTEROL TARTRATE 15 MCG: 15 SOLUTION RESPIRATORY (INHALATION) at 18:07

## 2021-11-24 RX ADMIN — ATORVASTATIN CALCIUM 10 MG: 10 TABLET, FILM COATED ORAL at 12:26

## 2021-11-24 RX ADMIN — CYCLOSPORINE 100 MG: 25 CAPSULE, LIQUID FILLED ORAL at 12:26

## 2021-11-24 RX ADMIN — ARFORMOTEROL TARTRATE 15 MCG: 15 SOLUTION RESPIRATORY (INHALATION) at 06:41

## 2021-11-24 RX ADMIN — PREDNISONE 5 MG: 5 TABLET ORAL at 12:26

## 2021-11-24 RX ADMIN — LEVOTHYROXINE SODIUM 50 MCG: 0.05 TABLET ORAL at 05:18

## 2021-11-24 RX ADMIN — SODIUM CHLORIDE: 9 INJECTION, SOLUTION INTRAVENOUS at 22:13

## 2021-11-24 RX ADMIN — CYCLOSPORINE 100 MG: 25 CAPSULE, LIQUID FILLED ORAL at 20:22

## 2021-11-24 RX ADMIN — BARIUM SULFATE 45 G: 0.81 POWDER, FOR SUSPENSION ORAL at 10:43

## 2021-11-24 RX ADMIN — Medication 300 UNITS: at 20:22

## 2021-11-24 RX ADMIN — Medication 10 ML: at 20:21

## 2021-11-24 RX ADMIN — PIPERACILLIN SODIUM AND TAZOBACTAM SODIUM 3375 MG: 3; .375 INJECTION, POWDER, LYOPHILIZED, FOR SOLUTION INTRAVENOUS at 14:55

## 2021-11-24 RX ADMIN — PIPERACILLIN SODIUM AND TAZOBACTAM SODIUM 3375 MG: 3; .375 INJECTION, POWDER, LYOPHILIZED, FOR SOLUTION INTRAVENOUS at 00:11

## 2021-11-24 ASSESSMENT — PAIN SCALES - WONG BAKER
WONGBAKER_NUMERICALRESPONSE: 0
WONGBAKER_NUMERICALRESPONSE: 0

## 2021-11-24 ASSESSMENT — PAIN SCALES - GENERAL
PAINLEVEL_OUTOF10: 0

## 2021-11-24 NOTE — PROGRESS NOTES
[facility] MD Kim Brandt MD Alean Sand, MD. Alexandra Day MD   Progress Note    11/23/2021    SUBJECTIVE:   11/20 : Seen in her room , was sleeping on CPAP , no LE edema , skin wrinkling in LE . No JVD   Case d/w RN   UO good   Cr slightly better /stable     11/21 : seen today , on o2/nc more alert oriented . Weak . Cr down . No LE edema . Poor po intake . 11/22 : seen in her room , on RA , confused . Weak  Euvolemic , no LE edema . Poor po intake     11/23 : seen today , lying in bed on o2/nc . Weak , confused . No LE edema, appear euvolemic . PROBLEM LIST:    Principal Problem:    Sepsis secondary to UTI [facility])  Active Problems:    Depression    Hypertension    Acute kidney injury superimposed on CKD (HCC)    Thyroid disease    Acute on chronic combined systolic (congestive) and diastolic (congestive) heart failure (HCC)  Resolved Problems:    * No resolved hospital problems.  *       DIET:    Diet NPO Exceptions are: Sips of Water with Meds       Allergies : Macrodantin [nitrofurantoin macrocrystal] and Sulfa antibiotics    Past Medical History:   Diagnosis Date    Abnormal EKG     left bundle branch block    Acute gout involving toe of right foot 9/3/2020    Acute gout involving toe of right foot 9/3/2020    Acute on chronic combined systolic (congestive) and diastolic (congestive) heart failure (HCC)     Cancer (HCC)     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    Cerebrovascular disease     Clotting disorder ([address] 1985    thrombophlebitis after c section delivery    Depression     15 years followed by dr Adrianne Alejo    Hyperlipidemia     Hypertension     Hyperthyroidism     Leg swelling 9/3/2020    Lymphedema of both lower extremities 9/3/2020    Peripheral vascular disease ([address]     Renal failure 11/2012    renal transplant    Thrombophlebitis     after c section delivery    Thyroid disease        Past Surgical History:   Procedure Laterality Date     SECTION  1985    one normal delivery    COLONOSCOPY      DIAGNOSTIC CARDIAC CATH LAB PROCEDURE      normal coronaries and  normal coronaries    DIALYSIS FISTULA CREATION  march and 2012    phase one and two patient will start dialysis when needed   [address]    transfemoral venous bypass grafts    KIDNEY TRANSPLANT  2016    KIDNEY TRANSPLANT  2015    KIDNEY TRANSPLANT  2015    PARATHYROIDECTOMY  2006    left parathyroid  implant and parathyroidectomy       Family History   Problem Relation Age of Onset    Diabetes Mother     Diabetes Father     Dementia Father         reports that she quit smoking about 6 years ago. She has a 20.00 pack-year smoking history. She has never used smokeless tobacco. She reports that she does not drink alcohol and does not use drugs. Review of Systems:   Constitutional: no fevers , no chills , feels ok   Eyes: no eye pain , no itching , no drainage  Ears, nose, mouth, throat, and face: no ear ,nose pain , hearing is ok ,no nasal drainage   Respiratory: no sob ,no cough ,no wheezing . Cardiovascular: no chest pain , no palpitation ,no sob . Gastrointestinal: no nausea, vomiting , constipation , no abdominal pain . Genitourinary:no urinary retention , no burning , dysuria . No polyuria   Hematologic/lymphatic: no bleeding , no cougulation issues . Musculoskeletal:no joint pain , no swelling . Neurological: no headaches ,no weakness , no numbness . Endocrine: no thirst , no weight issues .      MEDS (scheduled):    piperacillin-tazobactam  3,375 mg IntraVENous Q8H    sodium chloride flush  5-40 mL IntraVENous 2 times per day    Arformoterol Tartrate  15 mcg Nebulization BID    budesonide  500 mcg Nebulization BID    acyclovir  10 mg/kg (Adjusted) IntraVENous Q12H    allopurinol  100 mg Oral Daily    [Held by provider] cloZAPine  50 mg Oral Nightly    cycloSPORINE modified  100 mg Oral BID    desvenlafaxine succinate  50 mg Oral QPM    docusate sodium  100 mg Oral BID    folic acid  1 mg Oral Daily    levothyroxine  50 mcg Oral Daily    [Held by provider] lisinopril  5 mg Oral Daily    metoprolol succinate  50 mg Oral Daily    predniSONE  5 mg Oral Daily    atorvastatin  10 mg Oral Daily    heparin (porcine)  5,000 Units SubCUTAneous 3 times per day    ipratropium-albuterol  1 ampule Inhalation Q4H WA       MEDS (infusions):   sodium chloride 12.5 mL/hr at 11/23/21 2155    sodium chloride 25 mL (11/22/21 0850)    sodium chloride 35 mL/hr at 11/23/21 4091       MEDS (prn):  sodium chloride flush, sodium chloride, ondansetron **OR** ondansetron, acetaminophen **OR** acetaminophen, polyethylene glycol    PHYSICAL EXAM:     Patient Vitals for the past 24 hrs:   BP Temp Temp src Pulse Resp SpO2 Height   11/23/21 2256 -- -- -- 80 -- -- --   11/23/21 2153 137/61 98 °F (36.7 °C) Oral 80 19 99 % --   11/23/21 1603 -- -- -- -- -- 95 % --   11/23/21 1117 -- -- -- -- -- -- 5' 2\" (1.575 m)   11/23/21 1025 -- -- -- -- -- 92 % --   11/23/21 0815 (!) 159/79 98.4 °F (36.9 °C) Oral 84 19 93 % --   11/23/21 0639 -- -- -- -- 20 95 % --   11/23/21 0600 (!) 156/71 98.8 °F (37.1 °C) Infrared 88 21 93 % --   11/23/21 0545 -- -- -- -- -- 96 % --   11/23/21 0030 -- -- -- -- 20 -- --   @      Intake/Output Summary (Last 24 hours) at 11/23/2021 2305  Last data filed at 11/23/2021 1427  Gross per 24 hour   Intake 600 ml   Output 1300 ml   Net -700 ml         Wt Readings from Last 3 Encounters:   11/17/21 227 lb 3.2 oz (103.1 kg)   08/16/21 229 lb (103.9 kg)   10/01/20 245 lb 8 oz (111.4 kg)       Constitutional:  in no acute distress  Oral: mucus membranes moist  Neck: no JVD  Cardiovascular: S1, S2 regular rhythm, no murmur,or rub  Respiratory:  No crackles, no wheeze  Gastrointestinal:  Soft, nontender, nondistended, NABS  Ext: no edema, feet warm  Skin: dry, no rash  Neuro: awake, alert, interactive      DATA:    Recent Labs     11/21/21  0904 11/23/21  0858   WBC 8.5 11.1   HGB 11.3* 11.9   HCT 38.6 39.6   .3* 103.7*   * 147     Recent Labs     11/21/21  0904 11/21/21  0909 11/22/21  0921 11/23/21  0858   NA  --  141 142 138   K  --  5.4* 4.6 4.3   CL  --  107 105 105   CO2  --  22 25 23   MG 2.3  --   --   --    PHOS 2.2*  --   --   --    BUN  --  48* 41* 32*   CREATININE  --  1.3* 1.1* 1.1*   ALT  --  14 16 15   AST  --  27 26 19   BILITOT  --  0.5 0.6 0.8   ALKPHOS  --  49 53 51       No results found for: LABPROT    ASSESSMENT / RECOMMENDATIONS:      1. Acute kidney injury felt to be related to volume depletion in  context of urosepsis. 2.  Chronic kidney disease, stage 2 in the patient with kidney  transplant. Baseline _____ function with a creatinine of 1.1 to 1.3.  3. Immunosuppressant status, chronically on CellCept 1 gm b.i.d.,  cyclosporine 100 mg b.i.d., and prednisone. 4.  Urosepsis with bacteremia management per ID service.     PLAN:      Cr down to 1.1 (close to baseline )  UO ok          -continue NS at conservative rate 35 cc/hr for nuritition purpose  -hold MMF for now in light of bacteremia .  Continue prograf and prednison   -encourage po intake   -PT/OT   -f/u serial bmp , UO         Electronically signed by Raelene Heimlich, MD on 11/23/2021 at 11:05 PM

## 2021-11-24 NOTE — PROGRESS NOTES
[facility] MD Kriss Bates MD Mela Means, MD. Bienvenido Ritter MD   Progress Note    11/24/2021    SUBJECTIVE:   11/20 : Seen in her room , was sleeping on CPAP , no LE edema , skin wrinkling in LE . No JVD   Case d/w RN   UO good   Cr slightly better /stable     11/21 : seen today , on o2/nc more alert oriented . Weak . Cr down . No LE edema . Poor po intake . 11/22 : seen in her room , on RA , confused . Weak  Euvolemic , no LE edema . Poor po intake     11/23 : seen today , lying in bed on o2/nc . Weak , confused . No LE edema, appear euvolemic .    11/24 : seen in her room , poor po intake . O2/nc euvolemic , for mri today , remain confused , d/w Floor RN     PROBLEM LIST:    Principal Problem:    Sepsis secondary to UTI [facility])  Active Problems:    Depression    Hypertension    Acute kidney injury superimposed on CKD ([address]    Thyroid disease    Acute on chronic combined systolic (congestive) and diastolic (congestive) heart failure ([address]  Resolved Problems:    * No resolved hospital problems.  *       DIET:    Diet NPO Exceptions are: Sips of Water with Meds       Allergies : Macrodantin [nitrofurantoin macrocrystal] and Sulfa antibiotics    Past Medical History:   Diagnosis Date    Abnormal EKG     left bundle branch block    Acute gout involving toe of right foot 9/3/2020    Acute gout involving toe of right foot 9/3/2020    Acute on chronic combined systolic (congestive) and diastolic (congestive) heart failure (HCC)     Cancer (HCC)     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    Cerebrovascular disease     Clotting disorder ([address] 1985    thrombophlebitis after c section delivery    Depression     15 years followed by dr Steven Chase    Hyperlipidemia     Hypertension     Hyperthyroidism     Leg swelling 9/3/2020    Lymphedema of both lower extremities 9/3/2020    Peripheral vascular disease ([address]     Renal failure 11/2012    renal transplant  Thrombophlebitis     after c section delivery    Thyroid disease        Past Surgical History:   Procedure Laterality Date     SECTION  1985    one normal delivery    COLONOSCOPY      DIAGNOSTIC CARDIAC CATH LAB PROCEDURE      normal coronaries and  normal coronaries    DIALYSIS FISTULA CREATION  march and 2012    phase one and two patient will start dialysis when needed   [address]    transfemoral venous bypass grafts    KIDNEY TRANSPLANT  2016   [facility]     [facility]      PARATHYROIDECTOMY  2006    left parathyroid  implant and parathyroidectomy       Family History   Problem Relation Age of Onset    Diabetes Mother     Diabetes Father     Dementia Father         reports that she quit smoking about 6 years ago. She has a 20.00 pack-year smoking history. She has never used smokeless tobacco. She reports that she does not drink alcohol and does not use drugs. Review of Systems:   Constitutional: no fevers , no chills , feels ok   Eyes: no eye pain , no itching , no drainage  Ears, nose, mouth, throat, and face: no ear ,nose pain , hearing is ok ,no nasal drainage   Respiratory: no sob ,no cough ,no wheezing . Cardiovascular: no chest pain , no palpitation ,no sob . Gastrointestinal: no nausea, vomiting , constipation , no abdominal pain . Genitourinary:no urinary retention , no burning , dysuria . No polyuria   Hematologic/lymphatic: no bleeding , no cougulation issues . Musculoskeletal:no joint pain , no swelling . Neurological: no headaches ,no weakness , no numbness . Endocrine: no thirst , no weight issues .      MEDS (scheduled):    lidocaine PF  5 mL IntraDERmal Once    sodium chloride flush  5-40 mL IntraVENous 2 times per day    heparin flush  3 mL IntraVENous 2 times per day    piperacillin-tazobactam  3,375 mg IntraVENous Q8H    sodium chloride flush  5-40 mL IntraVENous 2 times per day    Arformoterol Tartrate  15 mcg Nebulization BID    budesonide  500 mcg Nebulization BID    acyclovir  10 mg/kg (Adjusted) IntraVENous Q12H    allopurinol  100 mg Oral Daily    [Held by provider] cloZAPine  50 mg Oral Nightly    cycloSPORINE modified  100 mg Oral BID    desvenlafaxine succinate  50 mg Oral QPM    docusate sodium  100 mg Oral BID    folic acid  1 mg Oral Daily    levothyroxine  50 mcg Oral Daily    [Held by provider] lisinopril  5 mg Oral Daily    metoprolol succinate  50 mg Oral Daily    predniSONE  5 mg Oral Daily    atorvastatin  10 mg Oral Daily    heparin (porcine)  5,000 Units SubCUTAneous 3 times per day    ipratropium-albuterol  1 ampule Inhalation Q4H WA       MEDS (infusions):   sodium chloride      sodium chloride Stopped (11/24/21 0655)    sodium chloride 25 mL (11/22/21 0850)    sodium chloride 35 mL/hr at 11/23/21 1126       MEDS (prn):  sodium chloride flush, sodium chloride, heparin flush, sodium chloride flush, sodium chloride, ondansetron **OR** ondansetron, acetaminophen **OR** acetaminophen, polyethylene glycol    PHYSICAL EXAM:     Patient Vitals for the past 24 hrs:   BP Temp Temp src Pulse Resp SpO2 Height   11/24/21 0755 (!) 163/74 98 °F (36.7 °C) Axillary 83 18 93 % --   11/24/21 0643 -- -- -- -- -- 96 % --   11/24/21 0213 -- -- -- -- 17 -- --   11/24/21 0007 130/63 97.8 °F (36.6 °C) Axillary 85 19 96 % --   11/23/21 2311 -- -- -- -- 20 -- --   11/23/21 2256 -- -- -- 80 -- -- --   11/23/21 2153 137/61 98 °F (36.7 °C) Oral 80 19 99 % --   11/23/21 1603 -- -- -- -- -- 95 % --   11/23/21 1117 -- -- -- -- -- -- 5' 2\" (1.575 m)   11/23/21 1025 -- -- -- -- -- 92 % --   @      Intake/Output Summary (Last 24 hours) at 11/24/2021 0922  Last data filed at 11/24/2021 0440  Gross per 24 hour   Intake 600 ml   Output 1650 ml   Net -1050 ml         Wt Readings from Last 3 Encounters:   11/17/21 227 lb 3.2 oz (103.1 kg)   08/16/21 229 lb (103.9 kg)   10/01/20 245 lb 8 oz (111.4 kg) Constitutional:  in no acute distress  Oral: mucus membranes moist  Neck: no JVD  Cardiovascular: S1, S2 regular rhythm, no murmur,or rub  Respiratory:  No crackles, no wheeze  Gastrointestinal:  Soft, nontender, nondistended, NABS  Ext: no edema, feet warm  Skin: dry, no rash  Neuro: awake, alert, interactive      DATA:    Recent Labs     11/23/21  0858   WBC 11.1   HGB 11.9   HCT 39.6   .7*        Recent Labs     11/22/21  0921 11/23/21  0858    138   K 4.6 4.3    105   CO2 25 23   BUN 41* 32*   CREATININE 1.1* 1.1*   ALT 16 15   AST 26 19   BILITOT 0.6 0.8   ALKPHOS 53 51       No results found for: LABPROT    ASSESSMENT / RECOMMENDATIONS:      1. Acute kidney injury felt to be related to volume depletion in  context of urosepsis. 2.  Chronic kidney disease, stage 2 in the patient with kidney  transplant. Baseline _____ function with a creatinine of 1.1 to 1.3.  3. Immunosuppressant status, chronically on CellCept 1 gm b.i.d.,  cyclosporine 100 mg b.i.d., and prednisone. 4.  Urosepsis with bacteremia management per ID service.     PLAN:      Cr down to 1.1 (close to baseline )  UO ok          -continue NS at conservative rate 35 cc/hr for nuritition purpose  -hold MMF for now in light of bacteremia .  Continue prograf and prednison   -encourage po intake   -PT/OT   -f/u serial bmp , UO         Electronically signed by Cara Mesa MD on 11/24/2021 at 9:22 AM

## 2021-11-24 NOTE — PROGRESS NOTES
Physical Therapy        [id number]    Patient unavailable for physical therapy treatment due to patient sleeping and doesn't wake to name being called nor sternal rub. Will attempt session at later time/date.      Leticia Bright, PTA  #[phone]

## 2021-11-24 NOTE — PROGRESS NOTES
[facility]     [address] [place] [address]  [place], [address]  Phone [phone]   [phone]      Admit Date: 2021  4:50 AM  Pt Name: Vic Alcaraz  MRN: [id number]  : 1955  Reason for Consult:    Chief Complaint   Patient presents with    Urinary Tract Infection     Requesting Physician:  Ward Dior MD  PCP: Ward Dior MD  History Obtained From:  patient, chart   ID consulted for Sepsis due to urinary tract infection ([address] [A41.9, N39.0]  Urinary tract infection without hematuria, site unspecified [N39.0]  Sepsis, due to unspecified organism, unspecified whether acute organ dysfunction present ([address] [A41.9]  on hospital day 7   Face to face encounter   CHIEF COMPLAINT       Chief Complaint   Patient presents with    Urinary Tract Infection     HISTORYOF PRESENT ILLNESS   Vic Alcaraz is a 73 y.o. female who presents with   has a past medical history of Abnormal EKG, Acute gout involving toe of right foot, Acute gout involving toe of right foot, Acute on chronic combined systolic (congestive) and diastolic (congestive) heart failure ([address] Cancer ([address] Cerebrovascular disease, Clotting disorder ([address] Depression, Hyperlipidemia, Hypertension, Hyperthyroidism, Leg swelling, Lymphedema of both lower extremities, Peripheral vascular disease ([address] Renal failure, Thrombophlebitis, and Thyroid disease. Urinary Tract Infection        Patient admitted to hospital from home. She was weak, reports dysuria, and generalized weakness. She has been having fever and chills at home. She was hypoxic at home- 88 %. She has a cough. She was admitted for further evaluation. She is known to ID service- she was last seen in 2019-for a klebsiella UTI- was discharge with cefepime due to tachycardia and prolonged QT with cipro.      She is currently on IV cefepime  Her temp has been as high as 101.7   She has a cough - and is tachypnic     ID has been asked to evaluate and manage atbs    Current visit  Vanna Holloway  11/24/21   PT IN BED more awake on nc   afebrile  On 2 Liters   CR-1.2   EOQ47.1    REVIEW OF SYSTEMS     CONSTITUTIONAL:   AS IN HPI   CURRENT MEDICATIONS     Current Facility-Administered Medications:     sodium chloride flush 0.9 % injection 5-40 mL, 5-40 mL, IntraVENous, 2 times per day, Lakisha Shields MD    sodium chloride flush 0.9 % injection 5-40 mL, 5-40 mL, IntraVENous, PRN, Arielle Ramirez MD    0.9 % sodium chloride infusion, 25 mL, IntraVENous, PRN, Arielle Ramirez MD    heparin flush 100 UNIT/ML injection 300 Units, 3 mL, IntraVENous, 2 times per day, Lakisha Shields MD    heparin flush 100 UNIT/ML injection 300 Units, 3 mL, IntraCATHeter, PRN, Arielle Ramirez MD    piperacillin-tazobactam (ZOSYN) 3,375 mg in dextrose 5 % 50 mL IVPB extended infusion (mini-bag), 3,375 mg, IntraVENous, Q8H, Maria White MD, Last Rate: 12.5 mL/hr at 11/24/21 1455, 3,375 mg at 11/24/21 1455    0.9 % sodium chloride infusion, , IntraVENous, Q8H, Maria White MD, Stopped at 11/24/21 0655    sodium chloride flush 0.9 % injection 5-40 mL, 5-40 mL, IntraVENous, 2 times per day, Amelia Boyer, APRN - CNP, 10 mL at 11/23/21 2156    sodium chloride flush 0.9 % injection 5-40 mL, 5-40 mL, IntraVENous, PRN, Amelia Boyer, APRN - CNP    0.9 % sodium chloride infusion, 25 mL, IntraVENous, PRN, Amelia Boyer, APRN - CNP, Last Rate: 100 mL/hr at 11/22/21 0850, 25 mL at 11/22/21 0850    Arformoterol Tartrate (BROVANA) nebulizer solution 15 mcg, 15 mcg, Nebulization, BID, Amelia Boyer, APRN - CNP, 15 mcg at 11/24/21 0641    budesonide (PULMICORT) nebulizer suspension 500 mcg, 500 mcg, Nebulization, BID, Amelia Boyer, APRN - CNP, 500 mcg at 11/24/21 0641    acyclovir (ZOVIRAX) 700 mg in dextrose 5 % 100 mL IVPB, 10 mg/kg (Adjusted), IntraVENous, Q12H, Amelia Boyer, APRN - CNP, Stopped at 11/24/21 1330    0.9 % sodium chloride infusion, , IntraVENous, Continuous, Wesley Lee MD, Last Rate: 35 mL/hr at 11/23/21 0628, New Bag at 11/23/21 0628    allopurinol (ZYLOPRIM) tablet 100 mg, 100 mg, Oral, Daily, Fritz Boom, APRN - CNP, 100 mg at 11/24/21 1225    [Held by provider] cloZAPine (CLOZARIL) tablet 50 mg, 50 mg, Oral, Nightly, Fritz Boom, APRN - CNP, 50 mg at 11/19/21 2213    cycloSPORINE modified (NEORAL) capsule 100 mg, 100 mg, Oral, BID, Fritz Boom, APRN - CNP, 100 mg at 11/24/21 1226    desvenlafaxine succinate (PRISTIQ) extended release tablet 50 mg, 50 mg, Oral, QPM, Fritz Boom, APRN - CNP, 50 mg at 11/23/21 1925    docusate sodium (COLACE) capsule 100 mg, 100 mg, Oral, BID, Fritz Boom, APRN - CNP, 100 mg at 68/44/16 0351    folic acid (FOLVITE) tablet 1 mg, 1 mg, Oral, Daily, Fritz Boom, APRN - CNP, 1 mg at 11/24/21 1237    levothyroxine (SYNTHROID) tablet 50 mcg, 50 mcg, Oral, Daily, Fritz Boom, APRN - CNP, 50 mcg at 11/24/21 0518    [Held by provider] lisinopril (PRINIVIL;ZESTRIL) tablet 5 mg, 5 mg, Oral, Daily, Fritz Boom, APRN - CNP, 5 mg at 11/17/21 1430    metoprolol succinate (TOPROL XL) extended release tablet 50 mg, 50 mg, Oral, Daily, Fritz Boom, APRN - CNP, 50 mg at 11/24/21 1238    predniSONE (DELTASONE) tablet 5 mg, 5 mg, Oral, Daily, Fritz Boom, APRN - CNP, 5 mg at 11/24/21 1226    atorvastatin (LIPITOR) tablet 10 mg, 10 mg, Oral, Daily, Fritz Boom, APRN - CNP, 10 mg at 11/24/21 1226    ondansetron (ZOFRAN-ODT) disintegrating tablet 4 mg, 4 mg, Oral, Q8H PRN **OR** ondansetron (ZOFRAN) injection 4 mg, 4 mg, IntraVENous, Q6H PRN, Fritz Solares APRN - CNP    acetaminophen (TYLENOL) tablet 650 mg, 650 mg, Oral, Q6H PRN, 650 mg at 11/17/21 2005 **OR** acetaminophen (TYLENOL) suppository 650 mg, 650 mg, Rectal, Q6H PRN, Fritz Solares, APRN - CNP    polyethylene glycol (GLYCOLAX) packet 17 g, 17 g, Oral, Daily PRN, Fritz Solares, APRN - CNP    heparin (porcine) injection 5,000 Units, 5,000 Units, SubCUTAneous, 3 times per day, KATINA Jhaveri - CNP, 5,000 Units at 21 1456    ipratropium-albuterol (DUONEB) nebulizer solution 1 ampule, 1 ampule, Inhalation, Q4H WA, KATINA Jhaveri - CNP, 1 ampule at 21 4003  ALLERGIES     Macrodantin [nitrofurantoin macrocrystal] and Sulfa antibiotics  Immunization History   Administered Date(s) Administered    COVID-19, Pfizer, PF, 30mcg/0.3mL 2021, 2021, 2021      Internal Administration   First Dose COVID-19, Pfizer, PF, 30mcg/0.3mL  2021   Second Dose COVID-19, Pfizer, PF, 30mcg/0.3mL   2021       Last COVID Lab SARS-CoV-2 (no units)   Date Value   2021 Not Detected     SARS-CoV-2, PCR (no units)   Date Value   2021 Not Detected     SARS-CoV-2, NAAT (no units)   Date Value   2021 Not Detected        PAST MEDICAL HISTORY     Past Medical History:   Diagnosis Date    Abnormal EKG     left bundle branch block    Acute gout involving toe of right foot 9/3/2020    Acute gout involving toe of right foot 9/3/2020    Acute on chronic combined systolic (congestive) and diastolic (congestive) heart failure (HCC)     Cancer (HCC)     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    Cerebrovascular disease     Clotting disorder ([facility] 1985    thrombophlebitis after c section delivery    Depression     15 years followed by dr Elke Shelton    Hyperlipidemia     Hypertension     Hyperthyroidism     Leg swelling 9/3/2020    Lymphedema of both lower extremities 9/3/2020    Peripheral vascular disease ([address]     Renal failure 2012    renal transplant    Thrombophlebitis     after c section delivery    Thyroid disease      SURGICAL HISTORY       Past Surgical History:   Procedure Laterality Date     SECTION  1985    one normal delivery    COLONOSCOPY      DIAGNOSTIC CARDIAC CATH LAB PROCEDURE      normal coronaries and 1996 normal coronaries    DIALYSIS FISTULA CREATION  march and july 2012    phase one and two patient will start dialysis when needed   [address]    transfemoral venous bypass grafts    KIDNEY TRANSPLANT  2016    KIDNEY TRANSPLANT  2015    KIDNEY TRANSPLANT  2015    PARATHYROIDECTOMY  2006    left parathyroid  implant and parathyroidectomy     PHYSICAL EXAM          Vitals:    11/24/21 0643 11/24/21 0755 11/24/21 1226 11/24/21 1520   BP:  (!) 163/74 (!) 145/68 (!) 171/72   Pulse:  83 85 80   Resp:  18  17   Temp:  98 °F (36.7 °C)  98.5 °F (36.9 °C)   TempSrc:  Axillary  Oral   SpO2: 96% 93%  94%   Weight:       Height:         Physical Exam   Constitutional/General:  1L in bed awake and  Head: NC/AT  Eyes: PERRL, EOMI  Pulmonary: Lungs DEC BS ANT  BIPAP  Cardiovascular:  S1/S2   Abdomen: Soft, + BS.    distension. Nontender. Extremities: Moves all extremities x 4.   ++ edema   Skin: Warm and dry  NO RASH   Neurologic:    No focal deficits  PIV  Chni yellow      DIAGNOSTIC RESULTS   RADIOLOGY:   XR CHEST (2 VW)    Result Date: 11/17/2021  EXAMINATION: TWO XRAY VIEWS OF THE CHEST 11/17/2021 9:31 am COMPARISON: None. HISTORY: ORDERING SYSTEM PROVIDED HISTORY: elevated BNP, wheezing TECHNOLOGIST PROVIDED HISTORY: Reason for exam:->elevated BNP, wheezing FINDINGS: There is prominence of the pulmonary vasculature. There is no effusion or pneumothorax. The cardiomediastinal silhouette is stable in size. The osseous structures are without acute process. Mild pulmonary vascular congestion. XR CHEST 1 VIEW    Result Date: 11/17/2021  EXAMINATION: ONE XRAY VIEW OF THE CHEST 11/17/2021 5:17 am COMPARISON: 2nd June 2020 HISTORY: ORDERING SYSTEM PROVIDED HISTORY: sepsis TECHNOLOGIST PROVIDED HISTORY: Reason for exam:->sepsis FINDINGS: Heart is mildly enlarged. Pulmonary vascularity is normal.  Lungs are clear. Neither costophrenic angle is blunted. Mild cardiomegaly.      LABS  Recent Labs 11/23/21  0858   WBC 11.1   HGB 11.9   HCT 39.6   .7*        Recent Labs     11/22/21  0921 11/22/21  0921 11/23/21  0858 11/23/21  0858 11/24/21  0901     --  138  --  141   K 4.6   < > 4.3   < > 4.0      < > 105   < > 105   CO2 25   < > 23   < > 27   BUN 41*  --  32*  --  24*   CREATININE 1.1*  --  1.1*  --  1.2*   GFRAA >60  --  >60  --  54   LABGLOM 50  --  50  --  45   GLUCOSE 92  --  92  --  95   PROT 6.3*  --  6.2*  --  6.2*   LABALBU 3.5  --  3.0*  --  3.1*   CALCIUM 10.5*  --  10.4*  --  10.6*   BILITOT 0.6  --  0.8  --  0.9   ALKPHOS 53  --  51  --  57   AST 26  --  19  --  16   ALT 16  --  15  --  14    < > = values in this interval not displayed. Lab Results   Component Value Date    COVID19 Not Detected 11/17/2021     COVID-19/MARINA-COV2 LABS  Recent Labs     11/22/21  0921 11/23/21  0858 11/24/21  0901   PROCAL  --  0.44* 0.28*   AST 26 19 16   ALT 16 15 14     MICROBIOLOGY:     Cultures :   Lab Results   Component Value Date    BC 5 Days no growth 11/17/2021    BC  12/17/2019     Refer to previous culture of CXBLD from 12-17-19 @1120 for  susceptibility results      ORG Klebsiella pneumoniae ssp pneumoniae 11/17/2021    ORG Staphylococcus coagulase-negative 11/17/2021    ORG Klebsiella pneumoniae ssp pneumoniae 11/17/2021     Lab Results   Component Value Date    BLOODCULT2  11/17/2021     This organism was isolated in one set. Susceptibility testing is not routinely done as this  organism frequently represents skin contamination. Additional testing can be ordered by calling the  Microbiology Department.       ORG Klebsiella pneumoniae ssp pneumoniae 11/17/2021    ORG Staphylococcus coagulase-negative 11/17/2021    ORG Klebsiella pneumoniae ssp pneumoniae 11/17/2021     Urine Culture, Routine   Date Value Ref Range Status   11/17/2021 <10,000 CFU/mL  Mixed gram positive organisms   (A)  Final   11/17/2021 >100,000 CFU/ml  Final   04/09/2021   Final    10 to 100,000 CFU/mL  Mixed rubio isolated. Further workup and sensitivity testing  is not routinely indicated and will not be performed. Mixed rubio isolated includes:  Escherichia coli  Mixed gram positive organisms  Alpha hemolytic Strep species  Yeast  Nonhemolytic Strep species       FINAL IMPRESSION    Patient is a 73 y.o. female who presented with   Chief Complaint   Patient presents with    Urinary Tract Infection    and admitted for Sepsis due to urinary tract infection ([facility] [A41.9, N39.0]  Urinary tract infection without hematuria, site unspecified [N39.0]  Sepsis, due to unspecified organism, unspecified whether acute organ dysfunction present ([facility] [A41.9]  · Fevers BETTER  · Leukocytosis  BETTER  · UTI/ dysuria  MIXED GPO/KLEBSIELLA  · Gn septicemia/prob pyelonephritis   · History of kidney transplant   · Rule out viral infection   resp viral panel neg s/p LP neg  · Immunocompromised patient S/P LP SUGGEST ASEPTIC WITH 100 MONOCYTES    Plan:   · Continue zosyn for 2 more weeks   · For PICC line placement  · procal- 0.44  · Monitor labs - watch creat   · Patient is for discharge to [facility]  · Med rec done      Imaging and labs were reviewed. Thank you for involving me in the care of Leslie Read. Please do not hesitate to call for any questions or concerns. Electronically signed by KATINA Yepez on 11/24/2021 at 4:42 PM    Phone [phone]  Fax [phone]    As above    This is a face to face encounter with Leslie Read on 11/24/21. I have performed and participated in the history, exam, medical decision making, and  POC with the NURSE PRACTITIONER, KATINA Yepez. picc line being placed  Cont atbx  Imaging and labs were reviewed. Leslie Read was informed of their diagnosis, indications, risks and benefits of treatment. Leslie Read had the opportunity to ask questions. All questions were answered.     Thank you for involving me in the care of Camilo Connors. Please do not hesitate to call for any questions or concerns.     Electronically signed by Thom Dillon MD on 11/24/2021 at 10:52 PM    Phone [phone]  Fax [phone]

## 2021-11-24 NOTE — PROCEDURES
SL power PICC Placement 11/24/2021    Product number: [id number] mhbv   Lot Number: [id number]   Consult: limited access   Ultrasound: yes   Right Basilic vein:                Upper Arm Circumference: 44cm    Size: 4.5fr    Exposed Length: 3cm    Internal Length: 37cm   Cut: 40cm   Vein Measurement: 0.60cm    Consent (telephone) obtained. Time out prior to procedure. Tip of cath in lower 1/3 SVC @ CAJ via VPS. Brisk blood return, flushed well and capped.  RN notified    Hanane Headley RN  11/24/2021  2:55 PM

## 2021-11-24 NOTE — PROGRESS NOTES
SPEECH/LANGUAGE PATHOLOGY  VIDEOFLUOROSCOPIC STUDY OF SWALLOWING (MBS)   and PLAN OF CARE    PATIENT NAME:  Abigail Fitch  (female)     MRN:  [id number]    :  1955  (73 y.o.)  STATUS:  Inpatient: Room 0518/0518-02    TODAY'S DATE:  2021  REFERRING PROVIDER:   Arabella LOCKETT PROVIDER ORDER: SLP video swallow  Date of order:  21   REASON FOR REFERRAL: dysphagia    EVALUATING THERAPIST: CHAPARRITA Rangel      RESULTS:      DYSPHAGIA DIAGNOSIS:  mild  oropharyngeal phase dysphagia     DIET RECOMMENDATIONS:  Soft and bite size consistency solids (IDDSI level 6) with  thin liquids (IDDSI level 0), MEDICATION ADMINISTRATION and Per patient choice/nursing judgement    FEEDING RECOMMENDATIONS:    Assistance level:  Set-up is required for all oral intake     Compensatory strategies recommended: Small bites/sips, Alternate solids and liquids and Check for oral pocketing     Discussed recommendations with nursing and/or faxed report to referring provider: Yes    Laryngeal Penetration and Aspiration:  Penetration WITHOUT aspiration was observed in today's study with  thin liquid    SPEECH THERAPY  PLAN OF CARE   The dysphagia POC is established based on physician order and dysphagia diagnosis    Skilled SLP intervention for dysphagia management on acute care 3-5 x per week until goals met, pt plateaus in function and/or discharged from hospital      Conditions Requiring Skilled Therapeutic Intervention for dysphagia:    slow mastication with impaire recollection and poor bolus formation resulting in posterior escape of bolus  Reduced laryngeal closure resulting in penetration    SPECIFIC DYSPHAGIA INTERVENTIONS TO INCLUDE:     Training in positioning for improved integrity of swallow  Compensatory strategy training   Therapeutic exercises  Trials of upgraded diet/liquid     Specific instructions for next treatment:  development and training of compensatory swallow strategies to improve airway protection and swallow function  Treatment Goals:    Short Term Goals:  Pt will implement identified compensatory swallowing strategies on 90% of opportunities or greater to improve airway protection and swallow function. Pt will participate in ongoing mealtime assessment to provide diet modification and compensatory strategy implementation to minimize risk of aspiration associated with PO intake  Pt will complete PO trials of upgraded diet textures with SLP only to determine the least restrictive PO diet to maintain adequate nutrition/hydration with no more than 1 overt s/s of pen/asp. Pt will complete oral motor strength/ coordination exercises to improve bolus prep/ control and mastication with  minimal verbal prompts . Pt will complete laryngeal strength/ ROM therapeutic exercises to improve airway protection for the least restrictive PO diet minimal verbal prompts    Long Term Goals:   Pt will improve oropharyngeal swallow function to ensure airway protection during PO intake to maintain adequate nutrition/hydration and decrease signs/symptoms of aspiration to less than 1 x/day. Patient/family Goal:    Did not state. Will further assess during treatment.     Plan of care discussed with Patient   The Patient did not demonstrate complete understanding of the diagnosis, prognosis and plan of care     Rehabilitation Potential/Prognosis: good                      ADMITTING DIAGNOSIS: Sepsis due to urinary tract infection ([facility] [A41.9, N39.0]  Urinary tract infection without hematuria, site unspecified [N39.0]  Sepsis, due to unspecified organism, unspecified whether acute organ dysfunction present ([address] [A41.9]     VISIT DIAGNOSIS:   Visit Diagnoses       Codes    Sepsis, due to unspecified organism, unspecified whether acute organ dysfunction present ([facility]    -  Primary A41.9    Urinary tract infection without hematuria, site unspecified     N39.0              PATIENT REPORT/COMPLAINT: does not state any issues     PRIOR LEVEL OF SWALLOW FUNCTION:    Past History of Dysphagia?:  none reported    Diet during hospital admission: NPO until MBSS can be completed      PROCEDURE:  Consistencies Administered During the Evaluation   Liquids: thin liquid and nectar thick liquid   Solids:  pureed foods and solid foods      Method of Intake:   straw, spoon  Fed by clinician      Position:   Seated, upright, Lateral plane    INSTRUMENTAL ASSESSMENT:      MBSImP Results:   Lip closure for intraoral bolus containment resulted in no labial escape. Tongue control during bolus hold maintained a cohesive bolus held between tongue to palate seal. Bolus preparation and mastication resulted in slow chewing and mashing with poor recollection and bolus formation resulting in posterior escape of less than 1/2 of the bolus. Bolus transport/lingual motion was with slow tongue motion. Oral residue was not observed. Initiation of the pharyngeal swallow occurred as the bolus head reached the valleculae. Soft palate elevation resulted in no bolus between the soft palate and the pharyngeal wall. Laryngeal elevation demonstrated complete superior movement of the thyroid cartilage with complete approximation of the arytenoids to the epiglottic petiole. Anterior hyoid excursion demonstrated complete anterior movement. Epiglottic movement resulted in complete inversion. Laryngeal vestibule closure was incomplete, as indicated by a shallow column of contrast within the laryngeal vestibule at the height of the swallow. Pharyngeal stripping wave was present and complete. Pharyngeal contraction could not be determined due to logistical reasons not related to physiologic impairment. Pharyngoesophageal segment opening was completely distended for complete duration with no obstruction of bolus flow. Tongue base retraction allowed no contrast between the retracted tongue base and the posterior pharyngeal wall. Pharyngeal residue was not present. Esophageal clearance in the upright position could not be assessed due to logistical reasons not related to physiologic impairment. PENETRATION-ASPIRATION SCALE (PAS):  THIN 2 = Material enters the airway, remains above vocal folds, and is ejected from the airway   MILDLY THICK 1 = Material does not enter the airway  MODERATELY THICK item not administered  PUREE 1 = Material does not enter the airway  HARD SOLID 1 = Material does not enter the airway       COMPENSATORY STRATEGIES    Compensatory strategies were not attempted      STRUCTURAL/FUNCTIONAL ANOMALIES   No structural/functional anomalies were noted    CERVICAL ESOPHAGEAL STAGE :     The cervical esophagus appeared adequate          ___________    Cognition:   Within functional limits for this exam    Oral Peripheral Examination   Generalized oral weakness    Current Respiratory Status   2 liters nasal cannula     Parameters of Speech Production  Respiration:  Adequate for speech production  Quality:   Within functional limits  Intensity: Within functional limits    Pain: No pain reported. EDUCATION:   The Speech Language Pathologist (SLP) completed education regarding results of evaluation and that intervention is warranted at this time. Learner: Patient  Education: Reviewed results and recommendations of this evaluation  Evaluation of Education:  [place] understanding    This plan may be re-evaluated and revised as warranted. Evaluation Time includes thorough review of current medical information, gathering information on past medical history/social history and prior level of function, completion of standardized testing/informal observation of tasks, assessment of data and education on plan of care and goals. [x]The admitting diagnosis and active problem list, have been reviewed prior to initiation of this evaluation.     CPT Code: 79861  dysphagia study    ACTIVE PROBLEM LIST:   Patient Active Problem List   Diagnosis    Peripheral vascular disease ([facility]    Depression    Clotting disorder ([facility]    Abnormal EKG    Cancer (HCC)    Over weight    S/p cadaver renal transplant    ESRD (end stage renal disease) ([facility]    Non morbid obesity due to excess calories    Hypertension    Leg swelling    Lymphedema of both lower extremities    Acute gout involving toe of right foot    Sepsis secondary to UTI ([facility]    Acute kidney injury superimposed on CKD ([facility]    Thyroid disease    Acute on chronic combined systolic (congestive) and diastolic (congestive) heart failure ([facility]       Gumaro Baltazar MSCCC/SLP  Speech Language Pathologist  [id number]

## 2021-11-24 NOTE — PROGRESS NOTES
Progress Note    Admitting Date and Time: 11/17/2021  4:50 AM  Admit Dx: Sepsis due to urinary tract infection ([facility] [A41.9, N39.0]  Urinary tract infection without hematuria, site unspecified [N39.0]  Sepsis, due to unspecified organism, unspecified whether acute organ dysfunction present ([facility] [A41.9]    Subjective:    Patient is  On o2 NC  . She is more alert ,  Oriented to place and person,     ROS: denies  n/v, HA unless stated above.      sodium chloride flush  5-40 mL IntraVENous 2 times per day    heparin flush  3 mL IntraVENous 2 times per day    piperacillin-tazobactam  3,375 mg IntraVENous Q8H    sodium chloride flush  5-40 mL IntraVENous 2 times per day    Arformoterol Tartrate  15 mcg Nebulization BID    budesonide  500 mcg Nebulization BID    acyclovir  10 mg/kg (Adjusted) IntraVENous Q12H    allopurinol  100 mg Oral Daily    [Held by provider] cloZAPine  50 mg Oral Nightly    cycloSPORINE modified  100 mg Oral BID    desvenlafaxine succinate  50 mg Oral QPM    docusate sodium  100 mg Oral BID    folic acid  1 mg Oral Daily    levothyroxine  50 mcg Oral Daily    [Held by provider] lisinopril  5 mg Oral Daily    metoprolol succinate  50 mg Oral Daily    predniSONE  5 mg Oral Daily    atorvastatin  10 mg Oral Daily    heparin (porcine)  5,000 Units SubCUTAneous 3 times per day    ipratropium-albuterol  1 ampule Inhalation Q4H WA     sodium chloride flush, 5-40 mL, PRN  sodium chloride, 25 mL, PRN  heparin flush, 3 mL, PRN  sodium chloride flush, 5-40 mL, PRN  sodium chloride, 25 mL, PRN  ondansetron, 4 mg, Q8H PRN   Or  ondansetron, 4 mg, Q6H PRN  acetaminophen, 650 mg, Q6H PRN   Or  acetaminophen, 650 mg, Q6H PRN  polyethylene glycol, 17 g, Daily PRN         Objective:    BP (!) 171/72   Pulse 80   Temp 98.5 °F (36.9 °C) (Oral)   Resp 18   Ht 5' 2\" (1.575 m)   Wt 227 lb 3.2 oz (103.1 kg) Comment: bed scale  SpO2 94%   BMI 41.56 kg/m²     General Appearance:  Laying in bed with o2 NC , more alert  Today    Head: normocephalic and atraumatic  Eyes: pupils equal, round, and reactive to light, conjunctivae normal   Pulmonary/Chest:  Decrease air entry bilateral, right lung coarse ronchi   Cardiovascular: regular rate, normal S1 and S2   Abdomen: soft, non-tender, distended, normal bowel sounds  Extremities: no cyanosis, no clubbing and trace  bilateral lower extremity edema  Neurologic: no cranial nerve deficit and speech normal, no focal deficit     Recent Labs     11/22/21  0921 11/23/21  0858 11/24/21  0901    138 141   K 4.6 4.3 4.0    105 105   CO2 25 23 27   BUN 41* 32* 24*   CREATININE 1.1* 1.1* 1.2*   GLUCOSE 92 92 95   CALCIUM 10.5* 10.4* 10.6*       Recent Labs     11/22/21  0921 11/23/21  0858 11/24/21  0901   ALKPHOS 53 51 57   PROT 6.3* 6.2* 6.2*   LABALBU 3.5 3.0* 3.1*   BILITOT 0.6 0.8 0.9   AST 26 19 16   ALT 16 15 14       Recent Labs     11/23/21  0858   WBC 11.1   RBC 3.82   HGB 11.9   HCT 39.6   .7*   MCH 31.2   MCHC 30.1*   RDW 13.9      MPV 14.0*           Radiology:   FL MODIFIED BARIUM SWALLOW W VIDEO   Final Result   1. Shallow penetration with thin barium only. 2. There is no evidence of aspiration or penetration with nectar or pudding   consistency barium. MRI BRAIN WO CONTRAST   Final Result   No evidence of acute infarct, hemorrhage, mass effect/midline shift, or   ventriculomegaly. IR LUMBAR PUNCTURE FOR DIAGNOSIS   Final Result   Successful fluoroscopic-guided lumbar puncture. CT CHEST WO CONTRAST   Final Result   Motion artifact limits evaluation of some images. Allowing for this   limitation, the findings are as follows:      Trace pleural effusions. Mild dependent atelectasis. Cardiomegaly. Question of enlarged ascending aorta, though suboptimally evaluated due to   motion artifact. No evidence of pneumonia. Small hiatal hernia.          XR CHEST (2 VW)   Final Result   New left CP angle opacity suspicious for pleural effusion. Differential   includes summation artifact. No other pulmonary consolidation      Persistent cardiomegaly with slight vascular congestion versus summation   artifact from large body habitus         NM LUNG VENT/PERFUSION (VQ)   Final Result   Low probability for pulmonary embolus. US DUP LOWER EXTREMITIES BILATERAL VENOUS   Final Result   No evidence of DVT in either lower extremity. CT ABDOMEN PELVIS WO CONTRAST Additional Contrast? None   Final Result   Limited evaluation for pyelonephritis given lack of IV contrast.  Allowing   for this limitation, the findings are as follows: The transplant kidney demonstrates perinephric stranding, which can be seen   in the setting of infection/inflammation. No hydronephrosis. Atrophic native kidneys with multiple cysts. Recommend nonemergent renal   ultrasound for further evaluation of an intermediate density structure in the   right kidney. Mildly dilated loops of proximal small bowel are noted, with decompressed   distal small bowel but no discrete transition point. This is favored to   reflect ileus, however obstruction cannot be excluded. Recommend monitoring   of bowel function and follow-up KUBs. Cardiomegaly. US RETROPERITONEAL COMPLETE   Final Result   TRANSPLANT KIDNEY: No evidence of hydronephrosis. Unremarkable echogenicity. NATIVE KIDNEYS: Findings of chronic medical renal disease. No evidence of   hydronephrosis. Small left renal cysts. XR CHEST (2 VW)   Final Result   Mild pulmonary vascular congestion. XR CHEST 1 VIEW   Final Result   Mild cardiomegaly.              Assessment:  Principal Problem:    Sepsis secondary to UTI [facility])  Active Problems:    Depression    Hypertension    Acute kidney injury superimposed on CKD ([facility]    Thyroid disease    Acute on chronic combined systolic (congestive) and diastolic (congestive) heart failure EF  10. Hyperparathyroidism and parathyroidectomy 2006:  TSH 0.376 and free T4 1.08.   11. Hyperkalemia:  Potassium is better ,  12. Video swallow eval ,  Recommended nectar thick liquid , will start diet with nectar thick    12. Altered mental status due to sepsis and medications and resp failure hypercapnia , all sedating medications were d/c   13.  Malnutrition , and  Decrease oral intake due to sepsis and altered mental status  , start diet      Electronically signed by Conrado Euceda MD on 11/24/2021 at 6:39 PM

## 2021-11-24 NOTE — DISCHARGE INSTR - COC
Continuity of Care Form    Patient Name: Abelardo Amos   :  1955  MRN:  [id number]    Admit date:  2021  Discharge date:  2021    Code Status Order: Full Code   Advance Directives:      Admitting Physician:  Aaliyah Mayfield MD  PCP: Aaliyah Mayfield MD    Discharging Nurse: Helen Pedroza RN  [address] Unit/Room#: 7630/6897-22  Discharging Unit Phone Number: [phone]    Emergency Contact:   Extended Emergency Contact Information  Primary Emergency Contact: Xuan Mazariegos  Home Phone: [phone]  Relation: Other   needed? No  Secondary Emergency Contact: Mayur Cheema   [address] Phone: [phone]  Mobile Phone: [phone]  Relation: Child   needed? No    Past Surgical History:  Past Surgical History:   Procedure Laterality Date     SECTION  1985    one normal delivery    COLONOSCOPY      DIAGNOSTIC CARDIAC CATH LAB PROCEDURE      normal coronaries and  normal coronaries    DIALYSIS FISTULA CREATION  march and 2012    phase one and two patient will start dialysis when needed    Racheal Romero 20    transfemoral venous bypass grafts    KIDNEY TRANSPLANT  2016    KIDNEY TRANSPLANT  2015    KIDNEY TRANSPLANT  2015    PARATHYROIDECTOMY  2006    left parathyroid  implant and parathyroidectomy       Immunization History:   Immunization History   Administered Date(s) Administered    COVID-19, Playfish, PF, 30mcg/0.3mL 2021, 2021, 2021       Active Problems:  Patient Active Problem List   Diagnosis Code    Peripheral vascular disease ([facility] I73.9    Depression F32. A    Clotting disorder (HCC) D68.9    Abnormal EKG R94.31    Cancer (HCC) C80.1    Over weight E66.3    S/p cadaver renal transplant Z94.0    ESRD (end stage renal disease) ([facility] N18.6    Non morbid obesity due to excess calories E66.09    Hypertension I10    Leg swelling M79.89    Lymphedema of both lower extremities I89.0    Acute gout involving toe of right foot M10.9    Sepsis secondary to UTI ([facility] A41.9, N39.0    Acute kidney injury superimposed on CKD ([facility] N17.9, N18.9    Thyroid disease E07.9    Acute on chronic combined systolic (congestive) and diastolic (congestive) heart failure (HCC) I50.43       Isolation/Infection:   Isolation            No Isolation          Patient Infection Status       Infection Onset Added Last Indicated Last Indicated By Review Planned Expiration Resolved Resolved By    None active    Resolved    COVID-19 (Rule Out) 11/17/21 11/17/21 11/17/21 Respiratory Panel, Molecular, with COVID-19 (Restricted: peds pts or suitable admitted adults) (Ordered)   11/18/21 Rule-Out Test Resulted    COVID-19 (Rule Out) 11/17/21 11/17/21 11/17/21 COVID-19, Rapid (Ordered)   11/17/21 Rule-Out Test Resulted            Nurse Assessment:  Last Vital Signs: BP (!) 145/68   Pulse 85   Temp 98 °F (36.7 °C) (Axillary)   Resp 18   Ht 5' 2\" (1.575 m)   Wt 227 lb 3.2 oz (103.1 kg) Comment: bed scale  SpO2 93%   BMI 41.56 kg/m²     Last documented pain score (0-10 scale): Pain Level: 0  Last Weight:   Wt Readings from Last 1 Encounters:   11/17/21 227 lb 3.2 oz (103.1 kg)     Mental Status:  oriented    IV Access:  - PICC - site  R Basilic, insertion date: 11/24/2021    Nursing Mobility/ADLs:  Walking   Assisted  Transfer  Assisted  Bathing  Assisted  Dressing  Assisted  Toileting  Assisted  Feeding  Independent  Med Admin  Independent  Med Delivery   whole    Wound Care Documentation and Therapy:        Elimination:  Continence: Bowel: intermittent  Bladder: intermittent  Urinary Catheter: None   Colostomy/Ileostomy/Ileal Conduit: No       Date of Last BM: 11/27/2021    Intake/Output Summary (Last 24 hours) at 11/24/2021 1238  Last data filed at 11/24/2021 0440  Gross per 24 hour   Intake 300 ml   Output 1650 ml   Net -1350 ml     I/O last 3 completed shifts:   In: 600 [P.O.:600]  Out: [address] [Urine:1650]    Safety Concerns: None    Impairments/Disabilities:      None    Nutrition Therapy:  Current Nutrition Therapy:   - Oral Diet:  General    Routes of Feeding: Oral  Liquids: No Restrictions  Daily Fluid Restriction: no  Last Modified Barium Swallow with Video (Video Swallowing Test): done on 11/24/21    Treatments at the Time of Hospital Discharge:   Respiratory Treatments: ***  Oxygen Therapy:  is not on home oxygen therapy. Ventilator:    - No ventilator support    Rehab Therapies: Physical Therapy and Occupational Therapy  Weight Bearing Status/Restrictions: No weight bearing restirctions  Other Medical Equipment (for information only, NOT a DME order):  walker  Other Treatments: ***    Patient's personal belongings (please select all that are sent with patient):  Patient's clothing and snacks sent    RN SIGNATURE: Yefri Arms RN/ Electronically signed by Ross Jarquin MD on 11/27/21 at 4:20 PM EST    CASE MANAGEMENT/SOCIAL WORK SECTION    Inpatient Status Date: 11/17/21    Readmission Risk Assessment Score:  Readmission Risk              Risk of Unplanned Readmission:  23           Discharging to Facility/ Agency   Name: [facility]  Address: [address] [place] [address]. [facility]  Phone: [phone]  UMY:[phone]    Dialysis Facility (if applicable)   Name:  Address:  Dialysis Schedule:  Phone:  Fax:    / signature: Electronically signed by Abbie Yang RN on 11/24/2021 at 12:40 PM      PHYSICIAN SECTION    Prognosis: Fair    Condition at Discharge: Stable    Rehab Potential (if transferring to Rehab): Good    Recommended Labs or Other Treatments After Discharge: ***    Physician Certification: I certify the above information and transfer of Leslie Read  is necessary for the continuing treatment of the diagnosis listed and that she requires [facility] for less 30 days.      Update Admission H&P: {CHP DME Changes in DFE:[id number]}          PHYSICIAN SIGNATURE: Electronically signed by Stanly Brunner, MD on 11/27/21 at 4:21 PM EST        [address] [facility]  ([address])  [address] [facility] [address]  [place], [facility] [place]  Phone [phone]   Fax [phone]        Geraldine Adrián Flores MD, MD Elizabeth Wei MD Brinda Magnuson, MD Munir P. Suellyn Carpen, MD Rozanna Locks, TAHMINA, CNP      Aleksandra Hernandez, TAHMINA, CNS      MD Lencho Smith MD     STANDING ORDERS (ID Protocol)     Visiting nurses are to write the Primary Care Physician and their own call back number on all laboratory requisition forms. Abnormal lab values are called to the physician by the nurse and NOT by the laboratory. Fax all labs to the office in a timely manner, during office hours. All faxes should include nurses name and call back number. Vascular Access Devices or VADs (TLC, PICC, Midline, etc) will be replaced as necessary. Draw all blood work from VADs, except for drug levels. If unable to access a VAD, insert a peripheral catheter temporarily. Contact the Primary Care Physician or [facility] office for surgical referral.  Use tPA (Clemente Johnston) as per agency protocol to restore patency of VAD. Remove VAD upon completion of IV antibiotics, unless otherwise specified by the ordering physician. If VAD cannot be removed, schedule appointment at office for removal.  Notify ordering physician or office if patient requires admission to the hospital with reason for admission. Discontinue all blood work upon completion of IV antibiotics, unless otherwise specified by ordering physician. Notify ordering physician if the patient does not receive the scheduled antibiotic for 24 hours or more. The Pharmacy and [address] [place] [address] may adjust the timing of the infusion and blood work to accommodate the patients home care conditions.   When PICC or VAD is to be removed, documentation of length of inserted PICC. PICC or VAD length is to correlate with inserted length and sent to physician at the time of removal.  Give the patient a list of antibiotics being administered with:  Drug name  Route  Frequency  Start/Stop date      ROUTINE LABS TO BE DRAWN/ORDERED:  Twice weekly (preferably every Monday & Thursday):  BUN Creatinine and liver panel  Complete Blood Count with differential (CBC with dif)  Once weekly:  C-Reactive Protein (not high sensitivity CRP)  Erythrocyte/Westergren Sedimentation Rate (WSR or ESR)  Total CPK for patients on Daptomycin (Cubicin®). Obtain CPK more often if the patient is experiencing muscle weakness or myalgias. Clinical Pharmacist is to adjust Vancomycin and Aminoglycosides unless otherwise indicated. Draw Vancomycin trough 30 minutes before the third dose  After starting drug, or   After the dosing or interval is changed. If the trough level is between 5 and 20 continue dose as ordered. Draw troughs twice weekly thereafter until troughs are stable (i.e. until trough is between 5 and 20 mcg/ml for two consecutive laboratory values). Once stable check troughs once weekly or every third dose. Please do not call physician unless the trough is < 5 or >20. If the trough is <20 continue dosing as ordered. If the trough is >20 call the office for further orders. Do not hold the dose while waiting for the trough result. Amingoglycosides (e.g. Gentamicin, Tobramycin and Amikacin) peaks and troughs should be drawn twice weekly (preferably on Mondays and Thursdays) or every third dose. Aminoglycoside peaks are not to be drawn if patient on Once-Daily Dosing (ODD). Call physician or office if the trough is:  >1 for gentamicin,   >2 for tobramycin, or   >5 for amikacin  When clinically indicated obtain:  Urine culture. If the patient has a fever with purulent drainage from Chin or suprapubic catheter, or foul smelling urine.  Do not irrigate a clogged otherwise ordered by physician. Call office to ensure stop date is correct before stopping antibiotics.        Carlos Shahid MD     Follow-up with ID in 2 weeks  Call office for appt [phone]

## 2021-11-24 NOTE — CARE COORDINATION
11/24/21 1231 CM note: COVID (-) 11/17/21. Per Delroy [facility], patient accepted. NO PRECERT NEEDED. WILL NEED NEGATIVE COVID TEST DAY OF DISCHARGE, SIGNED GINA, AND DISCHARGE ORDER SO CM/SW CAN COMPLETE HENS; HENS STARTED. Per ID patient will need IV zosyn X 2 weeks. PICC ordered. Ambulance form started and on soft chart.  Electronically signed by Hiram Mcclellan RN on 11/24/2021 at 12:35 PM

## 2021-11-25 LAB
ALBUMIN CSF: 12 MG/DL (ref 0–35)
ALBUMIN INDEX: 4.5 RATIO (ref 0–9)
ALBUMIN SERPL-MCNC: 3.1 G/DL (ref 3.5–5.2)
ALBUMIN, SERUM BY NEPHELOMETRY: 2674 MG/DL (ref 3500–5200)
ALP BLD-CCNC: 56 U/L (ref 35–104)
ALT SERPL-CCNC: 15 U/L (ref 0–32)
ANION GAP SERPL CALCULATED.3IONS-SCNC: 11 MMOL/L (ref 7–16)
AST SERPL-CCNC: 14 U/L (ref 0–31)
BILIRUB SERPL-MCNC: 1 MG/DL (ref 0–1.2)
BUN BLDV-MCNC: 20 MG/DL (ref 6–23)
CALCIUM SERPL-MCNC: 10.1 MG/DL (ref 8.6–10.2)
CHLORIDE BLD-SCNC: 104 MMOL/L (ref 98–107)
CO2: 27 MMOL/L (ref 22–29)
CREAT SERPL-MCNC: 1 MG/DL (ref 0.5–1)
GFR AFRICAN AMERICAN: >60
GFR NON-AFRICAN AMERICAN: 56 ML/MIN/1.73
GLUCOSE BLD-MCNC: 94 MG/DL (ref 74–99)
HERPES TYPE 1/2 IGM COMBINED: 0.55 IV
HERPES TYPE I/II IGG COMBINED: >22.4 IV
HSV 1 GLYCOPROTEIN G AB IGG: 45.4 IV
HSV 2 GLYCOPROTEIN G AB IGG: 17.9 IV
IGG CSF: 1 MG/DL (ref 0–6)
IGG INDEX: 0.54 RATIO (ref 0.28–0.66)
IGG SYNTHESIS RATE CSF: <0 MG/D
IGG/ALBUMIN CSF: 0.08 RATIO (ref 0.09–0.25)
IGG: 416 MG/DL (ref 768–1632)
Lab: NORMAL
Lab: NORMAL
MULTIPLE SCLEROSIS PANEL: ABNORMAL
OLIGOCLONAL BANDS CSF: NEGATIVE
OLIGOCLONAL BANDS NUMBER: 0 BANDS (ref 0–1)
POTASSIUM SERPL-SCNC: 3.9 MMOL/L (ref 3.5–5)
REPORT: NORMAL
REPORT: NORMAL
SODIUM BLD-SCNC: 142 MMOL/L (ref 132–146)
THIS TEST SENT TO: NORMAL
THIS TEST SENT TO: NORMAL
TOTAL PROTEIN: 5.7 G/DL (ref 6.4–8.3)

## 2021-11-25 PROCEDURE — 80053 COMPREHEN METABOLIC PANEL: CPT

## 2021-11-25 PROCEDURE — 97530 THERAPEUTIC ACTIVITIES: CPT

## 2021-11-25 PROCEDURE — 94640 AIRWAY INHALATION TREATMENT: CPT

## 2021-11-25 PROCEDURE — 97110 THERAPEUTIC EXERCISES: CPT

## 2021-11-25 PROCEDURE — 2580000003 HC RX 258: Performed by: INTERNAL MEDICINE

## 2021-11-25 PROCEDURE — 6370000000 HC RX 637 (ALT 250 FOR IP): Performed by: NURSE PRACTITIONER

## 2021-11-25 PROCEDURE — 94660 CPAP INITIATION&MGMT: CPT

## 2021-11-25 PROCEDURE — 6360000002 HC RX W HCPCS: Performed by: SPECIALIST

## 2021-11-25 PROCEDURE — 2700000000 HC OXYGEN THERAPY PER DAY

## 2021-11-25 PROCEDURE — 6360000002 HC RX W HCPCS: Performed by: NURSE PRACTITIONER

## 2021-11-25 PROCEDURE — 6360000002 HC RX W HCPCS: Performed by: INTERNAL MEDICINE

## 2021-11-25 PROCEDURE — 36415 COLL VENOUS BLD VENIPUNCTURE: CPT

## 2021-11-25 PROCEDURE — 2580000003 HC RX 258: Performed by: NURSE PRACTITIONER

## 2021-11-25 PROCEDURE — 2580000003 HC RX 258: Performed by: SPECIALIST

## 2021-11-25 PROCEDURE — 1200000000 HC SEMI PRIVATE

## 2021-11-25 RX ADMIN — IPRATROPIUM BROMIDE AND ALBUTEROL SULFATE 1 AMPULE: .5; 2.5 SOLUTION RESPIRATORY (INHALATION) at 06:59

## 2021-11-25 RX ADMIN — ATORVASTATIN CALCIUM 10 MG: 10 TABLET, FILM COATED ORAL at 10:33

## 2021-11-25 RX ADMIN — METOPROLOL SUCCINATE 50 MG: 50 TABLET, EXTENDED RELEASE ORAL at 10:33

## 2021-11-25 RX ADMIN — HEPARIN SODIUM 5000 UNITS: 5000 INJECTION INTRAVENOUS; SUBCUTANEOUS at 05:07

## 2021-11-25 RX ADMIN — PIPERACILLIN SODIUM AND TAZOBACTAM SODIUM 3375 MG: 3; .375 INJECTION, POWDER, LYOPHILIZED, FOR SOLUTION INTRAVENOUS at 05:08

## 2021-11-25 RX ADMIN — DOCUSATE SODIUM 100 MG: 100 CAPSULE ORAL at 22:01

## 2021-11-25 RX ADMIN — FOLIC ACID 1 MG: 1 TABLET ORAL at 10:33

## 2021-11-25 RX ADMIN — HEPARIN SODIUM 5000 UNITS: 5000 INJECTION INTRAVENOUS; SUBCUTANEOUS at 22:02

## 2021-11-25 RX ADMIN — CYCLOSPORINE 100 MG: 25 CAPSULE, LIQUID FILLED ORAL at 10:33

## 2021-11-25 RX ADMIN — ARFORMOTEROL TARTRATE 15 MCG: 15 SOLUTION RESPIRATORY (INHALATION) at 06:59

## 2021-11-25 RX ADMIN — ALLOPURINOL 100 MG: 100 TABLET ORAL at 10:33

## 2021-11-25 RX ADMIN — ACYCLOVIR SODIUM 700 MG: 50 INJECTION, SOLUTION INTRAVENOUS at 13:09

## 2021-11-25 RX ADMIN — LEVOTHYROXINE SODIUM 50 MCG: 0.05 TABLET ORAL at 05:07

## 2021-11-25 RX ADMIN — IPRATROPIUM BROMIDE AND ALBUTEROL SULFATE 1 AMPULE: .5; 2.5 SOLUTION RESPIRATORY (INHALATION) at 14:39

## 2021-11-25 RX ADMIN — HEPARIN SODIUM 5000 UNITS: 5000 INJECTION INTRAVENOUS; SUBCUTANEOUS at 15:25

## 2021-11-25 RX ADMIN — CYCLOSPORINE 100 MG: 25 CAPSULE, LIQUID FILLED ORAL at 22:00

## 2021-11-25 RX ADMIN — PIPERACILLIN SODIUM AND TAZOBACTAM SODIUM 3375 MG: 3; .375 INJECTION, POWDER, LYOPHILIZED, FOR SOLUTION INTRAVENOUS at 13:09

## 2021-11-25 RX ADMIN — SODIUM CHLORIDE: 9 INJECTION, SOLUTION INTRAVENOUS at 04:46

## 2021-11-25 RX ADMIN — SODIUM CHLORIDE, PRESERVATIVE FREE 10 ML: 5 INJECTION INTRAVENOUS at 22:06

## 2021-11-25 RX ADMIN — Medication 300 UNITS: at 22:02

## 2021-11-25 RX ADMIN — PREDNISONE 5 MG: 5 TABLET ORAL at 10:34

## 2021-11-25 RX ADMIN — DESVENLAFAXINE 50 MG: 50 TABLET, EXTENDED RELEASE ORAL at 22:01

## 2021-11-25 RX ADMIN — PIPERACILLIN SODIUM AND TAZOBACTAM SODIUM 3375 MG: 3; .375 INJECTION, POWDER, LYOPHILIZED, FOR SOLUTION INTRAVENOUS at 22:22

## 2021-11-25 RX ADMIN — BUDESONIDE 500 MCG: 0.5 INHALANT RESPIRATORY (INHALATION) at 06:59

## 2021-11-25 ASSESSMENT — PAIN DESCRIPTION - PROGRESSION: CLINICAL_PROGRESSION: NOT CHANGED

## 2021-11-25 ASSESSMENT — PAIN SCALES - GENERAL
PAINLEVEL_OUTOF10: 0

## 2021-11-25 ASSESSMENT — PAIN SCALES - WONG BAKER
WONGBAKER_NUMERICALRESPONSE: 0

## 2021-11-25 NOTE — PROGRESS NOTES
Physical Therapy Treatment Note/Plan of Care    Room #:  3954/8050-93  Patient Name: Godwin Ross  YOB: 1955  MRN: [id number]    Date of Service: 11/25/2021     Tentative placement recommendation: Subacute rehab  Equipment recommendation: To be determined      Evaluating Physical Therapist: Dayna Miles #[id number]      Specific Provider Orders/Date/Referring Provider :   11/17/21 1200   PT evaluation and treat Start: 11/17/21 1200, End: 11/17/21 1200, ONE TIME, Standing Count: 1 Occurrences, R    Kevon Leigh, APRN - CNP    Admitting Diagnosis:   Sepsis due to urinary tract infection ([facility] [address] [A41.9, N39.0]  Urinary tract infection without hematuria, site unspecified [N39.0]  Sepsis, due to unspecified organism, unspecified whether acute organ dysfunction present [facility]) [A41.9]      Surgery: none  Visit Diagnoses       Codes    Sepsis, due to unspecified organism, unspecified whether acute organ dysfunction present [facility])    -  Primary A41.9    Urinary tract infection without hematuria, site unspecified     N39.0          Patient Active Problem List   Diagnosis    Peripheral vascular disease ([address]    Depression    Clotting disorder ([address]    Abnormal EKG    Cancer ([address]    Over weight    S/p cadaver renal transplant    ESRD (end stage renal disease) ([address]    Non morbid obesity due to excess calories    Hypertension    Leg swelling    Lymphedema of both lower extremities    Acute gout involving toe of right foot    Sepsis secondary to UTI ([address]    Acute kidney injury superimposed on CKD ([address]    Thyroid disease    Acute on chronic combined systolic (congestive) and diastolic (congestive) heart failure ([address]        ASSESSMENT of Current Deficits Patient exhibits decreased strength, balance and endurance impairing functional mobility, transfers and gait . Eyes closed throughout session, would only answer with one word answers.  Patient lethargic throughout session; patient unsafe for edge of bed and transfers d/t to pt overall debility, decreased activity tolerance, balance deficits, safety and fall risk. The patient will benefit from continued skilled therapy to increase strength and improve balance for safe functional mobility, to decrease risk of falls, and to meet goals at discharge. PHYSICAL THERAPY  PLAN OF CARE       Physical therapy plan of care is established based on physician order,  patient diagnosis and clinical assessment    Current Treatment Recommendations:    -Bed Mobility: Lower extremity exercises   -Sitting Balance: Incorporate reaching activities to activate trunk muscles   -Standing Balance: Perform strengthening exercises in standing to promote motor control with or without upper extremity support   -Transfers: Provide instruction on proper hand and foot position for adequate transfer of weight onto lower extremities and use of gait device if needed and Cues for hand placement, technique and safety. Provide stabilization to prevent fall   -Gait: Gait training and Standing activities to improve: base of support, weight shift, weight bearing      PT long term treatment goals are located in below grid    Patient and or family understand(s) diagnosis, prognosis, and plan of care. Frequency of treatments: Patient will be seen  daily.          Prior Level of Function: Patient ambulated independently   Rehab Potential: good  for baseline    Past medical history:   Past Medical History:   Diagnosis Date    Abnormal EKG     left bundle branch block    Acute gout involving toe of right foot 9/3/2020    Acute gout involving toe of right foot 9/3/2020    Acute on chronic combined systolic (congestive) and diastolic (congestive) heart failure (HCC)     Cancer (HCC)     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    Cerebrovascular disease     Clotting disorder ([facility] [address] 1985    thrombophlebitis after c section delivery    Depression     15 years followed by dr Donato Ashraf Hyperlipidemia     Hypertension     Hyperthyroidism     Leg swelling 9/3/2020    Lymphedema of both lower extremities 9/3/2020    Peripheral vascular disease ([address]     Renal failure 11/2012    renal transplant    Thrombophlebitis     after c section delivery    Thyroid disease      Past Surgical History:   Procedure Laterality Date   [address]    one normal delivery    COLONOSCOPY      DIAGNOSTIC CARDIAC CATH LAB PROCEDURE  2006    normal coronaries and 1996 normal coronaries    DIALYSIS FISTULA CREATION  march and july 2012    phase one and two patient will start dialysis when needed   [address]    transfemoral venous bypass grafts    KIDNEY TRANSPLANT  2016   [facility]  2015   [facility]  2015    PARATHYROIDECTOMY  2006    left parathyroid  implant and parathyroidectomy       SUBJECTIVE:    Precautions: Up with assistance, falls , shaky movement all extremities, desat quickly    Social history: Patient lives alone in a apartment 9th, elevator access  with No steps  to enter  Tub shower grab bars    Equipment owned: [address] [facility] [address] chair,      [address]   How much difficulty turning over in bed?: A Lot  How much difficulty sitting down on / standing up from a chair with arms?: A Lot  How much help from another person moving to and from a bed to a chair?: A Lot  How much help from another person needed to walk in hospital room?: A Lot  How much help from another person for climbing 3-5 steps with a railing?: Total    Nursing cleared patient for PT treatment. OBJECTIVE;   Initial Evaluation  Date: 11/18/2021 Treatment Date:  11/25/2021     Short Term/ Long Term   Goals   Was pt agreeable to Eval/treatment? Yes Yes  To be met in 4 days   Pain level   0/10  Very fatigued  Not stated    Bed Mobility    Rolling: Moderate assist of 1    Supine to sit: Moderate assist of 1    Sit to supine:  Moderate assist of 1 Scooting: Moderate assist of 1   Rolling: Moderate assist of 1   Supine to sit: Not assessed    Sit to supine: Not assessed    Scooting: Moderate assist of 1    Rolling: Independent    Supine to sit: Independent    Sit to supine: Independent    Scooting: Independent     Transfers Sit to stand: Not assessed  d/t to pt overall debility, decreased activity tolerance, balance deficits, safety and fall risk. Sit to stand: Not assessed  d/t to pt overall debility, decreased activity tolerance, balance deficits, safety and fall risk. Sit to stand: Moderate assist of 1    Ambulation    not assessed  not assessed     5 feet using  wheeled walker with Moderate assist of 1          ROM Within functional limits    Increase range of motion 10% of affected joints    Strength BUE:  refer to OT eval  RLE:  4-/5  LLE:  4-/5  Increase strength in affected mm groups by 1/3 grade   Balance Sitting EOB:  poor   Dynamic Standing:  not assessed  Sitting EOB: not assessed   Dynamic Standing: not assessed    Sitting EOB:  fair   Dynamic Standing: fair      Patient is Alert & Oriented x person, place, time and situation and follows directions    Sensation:  Patient  denies numbness/tingling   Edema:  no   Endurance: fair  -    Vitals: Bipap   Blood Pressure at rest  Blood Pressure during session    Heart Rate at rest  Heart Rate during session    SPO2 at rest %  SPO2 during session      Patient education  Patient educated on role of Physical Therapy, risks of immobility, safety and plan of care,  importance of mobility while in hospital , purse lip breathing, ankle pumps, quad set and glut set for edema control, blood clot prevention and safety      Patient response to education:   Pt verbalized understanding Pt demonstrated skill Pt requires further education in this area   Yes Partial Yes      Treatment:  Patient practiced and was instructed/facilitated in the following treatment: Worked with pt rolling . Performed supine exercise. Therapeutic Exercises:  ankle pumps, quad sets, heel slide, hip abduction/adduction and straight leg raise 2 x 10 reps. At end of session, patient in bed with alarm and nursing aid present call light and phone within reach,  all lines and tubes intact, nursing notified. Patient would benefit from continued skilled Physical Therapy to improve functional independence and quality of life.          Patient's/ family goals   rehab    Time in  0810  Time out  0835    Total Treatment Time  25 minutes    CPT codes:    Therapeutic activities (72880)   10 minutes  1 unit(s)  Therapeutic exercises (30965)   15 minutes  1 unit(s)    Samuel Sosa PTA T#[id number]

## 2021-11-25 NOTE — PROGRESS NOTES
Progress Note    Admitting Date and Time: 11/17/2021  4:50 AM  Admit Dx: Sepsis due to urinary tract infection ([facility] [A41.9, N39.0]  Urinary tract infection without hematuria, site unspecified [N39.0]  Sepsis, due to unspecified organism, unspecified whether acute organ dysfunction present ([facility] [A41.9]    Subjective:    She is alert ,  Oriented x 3 , feels and looks much better     ROS: denies  n/v, HA unless stated above.      sodium chloride flush  5-40 mL IntraVENous 2 times per day    heparin flush  3 mL IntraVENous 2 times per day    piperacillin-tazobactam  3,375 mg IntraVENous Q8H    sodium chloride flush  5-40 mL IntraVENous 2 times per day    Arformoterol Tartrate  15 mcg Nebulization BID    budesonide  500 mcg Nebulization BID    acyclovir  10 mg/kg (Adjusted) IntraVENous Q12H    allopurinol  100 mg Oral Daily    [Held by provider] cloZAPine  50 mg Oral Nightly    cycloSPORINE modified  100 mg Oral BID    desvenlafaxine succinate  50 mg Oral QPM    docusate sodium  100 mg Oral BID    folic acid  1 mg Oral Daily    levothyroxine  50 mcg Oral Daily    [Held by provider] lisinopril  5 mg Oral Daily    metoprolol succinate  50 mg Oral Daily    predniSONE  5 mg Oral Daily    atorvastatin  10 mg Oral Daily    heparin (porcine)  5,000 Units SubCUTAneous 3 times per day    ipratropium-albuterol  1 ampule Inhalation Q4H WA     sodium chloride flush, 5-40 mL, PRN  sodium chloride, 25 mL, PRN  heparin flush, 3 mL, PRN  sodium chloride flush, 5-40 mL, PRN  sodium chloride, 25 mL, PRN  ondansetron, 4 mg, Q8H PRN   Or  ondansetron, 4 mg, Q6H PRN  acetaminophen, 650 mg, Q6H PRN   Or  acetaminophen, 650 mg, Q6H PRN  polyethylene glycol, 17 g, Daily PRN         Objective:    /76   Pulse 80   Temp 98.5 °F (36.9 °C) (Oral)   Resp 20   Ht 5' 2\" (1.575 m)   Wt 227 lb 3.2 oz (103.1 kg) Comment: bed scale  SpO2 97%   BMI 41.56 kg/m²     General Appearance:  Laying in bed with o2 NC , more alert Head: normocephalic and atraumatic  Eyes: pupils equal, round, and reactive to light, conjunctivae normal   Pulmonary/Chest:  Decrease air entry bilateral,   Cardiovascular: regular rate, normal S1 and S2   Abdomen: soft, non-tender, distended, normal bowel sounds  Extremities: no cyanosis, no clubbing and trace  bilateral lower extremity edema  Neurologic: no cranial nerve deficit and speech normal, no focal deficit     Recent Labs     11/23/21  0858 11/24/21  0901 11/25/21  0546    141 142   K 4.3 4.0 3.9    105 104   CO2 23 27 27   BUN 32* 24* 20   CREATININE 1.1* 1.2* 1.0   GLUCOSE 92 95 94   CALCIUM 10.4* 10.6* 10.1       Recent Labs     11/23/21  0858 11/24/21  0901 11/25/21  0546   ALKPHOS 51 57 56   PROT 6.2* 6.2* 5.7*   LABALBU 3.0* 3.1* 3.1*   BILITOT 0.8 0.9 1.0   AST 19 16 14   ALT 15 14 15       Recent Labs     11/23/21  0858   WBC 11.1   RBC 3.82   HGB 11.9   HCT 39.6   .7*   MCH 31.2   MCHC 30.1*   RDW 13.9      MPV 14.0*           Radiology:   FL MODIFIED BARIUM SWALLOW W VIDEO   Final Result   1. Shallow penetration with thin barium only. 2. There is no evidence of aspiration or penetration with nectar or pudding   consistency barium. MRI BRAIN WO CONTRAST   Final Result   No evidence of acute infarct, hemorrhage, mass effect/midline shift, or   ventriculomegaly. IR LUMBAR PUNCTURE FOR DIAGNOSIS   Final Result   Successful fluoroscopic-guided lumbar puncture. CT CHEST WO CONTRAST   Final Result   Motion artifact limits evaluation of some images. Allowing for this   limitation, the findings are as follows:      Trace pleural effusions. Mild dependent atelectasis. Cardiomegaly. Question of enlarged ascending aorta, though suboptimally evaluated due to   motion artifact. No evidence of pneumonia. Small hiatal hernia. XR CHEST (2 VW)   Final Result   New left CP angle opacity suspicious for pleural effusion. Differential   includes summation artifact. No other pulmonary consolidation      Persistent cardiomegaly with slight vascular congestion versus summation   artifact from large body habitus         NM LUNG VENT/PERFUSION (VQ)   Final Result   Low probability for pulmonary embolus. US DUP LOWER EXTREMITIES BILATERAL VENOUS   Final Result   No evidence of DVT in either lower extremity. CT ABDOMEN PELVIS WO CONTRAST Additional Contrast? None   Final Result   Limited evaluation for pyelonephritis given lack of IV contrast.  Allowing   for this limitation, the findings are as follows: The transplant kidney demonstrates perinephric stranding, which can be seen   in the setting of infection/inflammation. No hydronephrosis. Atrophic native kidneys with multiple cysts. Recommend nonemergent renal   ultrasound for further evaluation of an intermediate density structure in the   right kidney. Mildly dilated loops of proximal small bowel are noted, with decompressed   distal small bowel but no discrete transition point. This is favored to   reflect ileus, however obstruction cannot be excluded. Recommend monitoring   of bowel function and follow-up KUBs. Cardiomegaly. US RETROPERITONEAL COMPLETE   Final Result   TRANSPLANT KIDNEY: No evidence of hydronephrosis. Unremarkable echogenicity. NATIVE KIDNEYS: Findings of chronic medical renal disease. No evidence of   hydronephrosis. Small left renal cysts. XR CHEST (2 VW)   Final Result   Mild pulmonary vascular congestion. XR CHEST 1 VIEW   Final Result   Mild cardiomegaly.              Assessment:  Principal Problem:    Sepsis secondary to UTI [facility])  Active Problems:    Depression    Hypertension    Acute kidney injury superimposed on CKD ([address]    Thyroid disease    Acute on chronic combined systolic (congestive) and diastolic (congestive) heart failure ([address]  Resolved Problems:    * No resolved hospital problems. *      Plan:  1. Sepsis secondary to Pyelonephritis/UTI:  WBC 13.5, Temp 101.7, heart rate 107, Urinalysis was positive for a UTI and blood cultures are positive for Gram positive cocci in clusters and klebsiella   Urine culture growing Klebsiella pneuomoniae. CT of the abd/pelvis showed perinephric stranding in transplant kidney. ID following. Continue Cefepime. 2. Acute respiratory failure with hypercapnia mainly :  She was requiring supplemental oxygen at 2 liters and is on room air at home   Mental status much better back to normal   3.  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 On  Acyclovir. improving . 4. Acute on chronic systolic and diastolic heart failure:  Pro-BNP greater than 2000, Last Echo on 12/21/19 revealed an EF of 56-72%, stage I diastolic dysfunction. Chest xray with pulmonary vascular congestion. HFpEF on echo done this admission   5. Bacteremia:  Blood cultures positive for Klebsiella pneumoniae and Staphylococcus coagulase-negative. ID following. Continue cefepime. And vanc    6. Acute on chronic kidney disease in a patient with a history of kidney transplan:  Kidney transplant in 2015. Creatinine was 1.7 on admission, trended up to 2.3 and down to 1.0  Baseline  . Nephrology following. Lisinopril on hold. 7. Bilateral lower extremity edema:  D dimer was 836. Ultrasound of the lower extremities negative for DVT. VQ scan showed low probability for PE. On heparin for dvt prophylaxis   8. Depression: Continue home medications. 9. Elevated troponin level:  Troponin levels: 52->88->56. Cardiology was consulted and likely acute myocardial injury secondary to sepsis, bacteremia and acute renal failure. Outpatient ischemic evaluation. , echo good EF  10. Hyperparathyroidism and parathyroidectomy 2006:  TSH 0.376 and free T4 1.08.   11. Hyperkalemia:  Potassium is better ,  12.  Video swallow eval ,  Recommended nectar thick liquid , will start diet with nectar thick  , low appetite , add ensure , and will repeat swallow eval in few lau since her mental status is much better    13. Altered mental status due to sepsis and medications and resp failure hypercapnia , all sedating medications were d/c   14.  Malnutrition , and  Decrease oral intake due to sepsis , mental status much better , encourage oral intake   Discussed with pt and her son ,      Electronically signed by Ana Biswas MD on 11/25/2021 at 11:37 AM

## 2021-11-25 NOTE — PROGRESS NOTES
[facility]     [address] [place] [address] 80  [place], [address] [place] [address] Street  Phone [phone]   [phone]      Admit Date: 2021  4:50 AM  Pt Name: Ximena Membreno  MRN: [id number]  : 1955  Reason for Consult:    Chief Complaint   Patient presents with    Urinary Tract Infection     Requesting Physician:  Anuja Middleton MD  PCP: Anuja Middleton MD  History Obtained From:  patient, chart   ID consulted for Sepsis due to urinary tract infection ([address] [A41.9, N39.0]  Urinary tract infection without hematuria, site unspecified [N39.0]  Sepsis, due to unspecified organism, unspecified whether acute organ dysfunction present ([address] [A41.9]  on hospital day 8   Face to face encounter   CHIEF COMPLAINT       Chief Complaint   Patient presents with    Urinary Tract Infection     HISTORYOF PRESENT ILLNESS   Ximena Membreno is a 73 y.o. female who presents with   has a past medical history of Abnormal EKG, Acute gout involving toe of right foot, Acute gout involving toe of right foot, Acute on chronic combined systolic (congestive) and diastolic (congestive) heart failure ([address] Cancer ([address] Cerebrovascular disease, Clotting disorder ([address] Depression, Hyperlipidemia, Hypertension, Hyperthyroidism, Leg swelling, Lymphedema of both lower extremities, Peripheral vascular disease ([address] Renal failure, Thrombophlebitis, and Thyroid disease. Urinary Tract Infection        Patient admitted to hospital from home. She was weak, reports dysuria, and generalized weakness. She has been having fever and chills at home. She was hypoxic at home- 88 %. She has a cough. She was admitted for further evaluation. She is known to ID service- she was last seen in 2019-for a klebsiella UTI- was discharge with cefepime due to tachycardia and prolonged QT with cipro.      She is currently on IV cefepime  Her temp has been as high as 101.7   She has a cough - and is tachypnic     ID has been asked to evaluate and manage atbs    Current visit  Caroline Yash  11/25/21   PT IN BED more awake and alert has no c/o   Answers approrpiately    afebrile  On 2 Liters     REVIEW OF SYSTEMS     CONSTITUTIONAL:   AS IN HPI   CURRENT MEDICATIONS     Current Facility-Administered Medications:     sodium chloride flush 0.9 % injection 5-40 mL, 5-40 mL, IntraVENous, 2 times per day, Tam Sun MD, 10 mL at 11/24/21 2021    sodium chloride flush 0.9 % injection 5-40 mL, 5-40 mL, IntraVENous, PRN, Dano Ramirez MD    0.9 % sodium chloride infusion, 25 mL, IntraVENous, PRN, Dano Ramirez MD    heparin flush 100 UNIT/ML injection 300 Units, 3 mL, IntraVENous, 2 times per day, Tam Sun MD, 300 Units at 11/24/21 2022    heparin flush 100 UNIT/ML injection 300 Units, 3 mL, IntraCATHeter, PRN, Dano Ramirez MD    piperacillin-tazobactam (ZOSYN) 3,375 mg in dextrose 5 % 50 mL IVPB extended infusion (mini-bag), 3,375 mg, IntraVENous, Q8H, Viral Espinoza MD, Stopped at 11/25/21 1031    0.9 % sodium chloride infusion, , IntraVENous, Q8H, Viral Espinoza MD, Stopped at 11/25/21 0509    sodium chloride flush 0.9 % injection 5-40 mL, 5-40 mL, IntraVENous, 2 times per day, Darlington Blocker, APRN - CNP, 10 mL at 11/24/21 2021    sodium chloride flush 0.9 % injection 5-40 mL, 5-40 mL, IntraVENous, PRN, Keenan Blocker, APRN - CNP    0.9 % sodium chloride infusion, 25 mL, IntraVENous, PRN, Darlington Blocker, APRN - CNP, Last Rate: 100 mL/hr at 11/22/21 0850, 25 mL at 11/22/21 0850    Arformoterol Tartrate (BROVANA) nebulizer solution 15 mcg, 15 mcg, Nebulization, BID, Keenan Blocker, APRN - CNP, 15 mcg at 11/25/21 0659    budesonide (PULMICORT) nebulizer suspension 500 mcg, 500 mcg, Nebulization, BID, Darlington Blocker, APRN - CNP, 500 mcg at 11/25/21 0659    acyclovir (ZOVIRAX) 700 mg in dextrose 5 % 100 mL IVPB, 10 mg/kg (Adjusted), IntraVENous, Q12H, Darlington Blocker, APRN - CNP, Stopped at 11/25/21 0057    0.9 % sodium chloride infusion, , IntraVENous, Continuous, Dipak Nam MD, Last Rate: 35 mL/hr at 11/24/21 2213, New Bag at 11/24/21 2213    allopurinol (ZYLOPRIM) tablet 100 mg, 100 mg, Oral, Daily, Vernard Sprout, APRN - CNP, 100 mg at 11/25/21 1033    [Held by provider] cloZAPine (CLOZARIL) tablet 50 mg, 50 mg, Oral, Nightly, Vernard Sprout, APRN - CNP, 50 mg at 11/19/21 2213    cycloSPORINE modified (NEORAL) capsule 100 mg, 100 mg, Oral, BID, Vernard Sprout, APRN - CNP, 100 mg at 11/25/21 1033    desvenlafaxine succinate (PRISTIQ) extended release tablet 50 mg, 50 mg, Oral, QPM, Vernard Sprout, APRN - CNP, 50 mg at 11/24/21 1801    docusate sodium (COLACE) capsule 100 mg, 100 mg, Oral, BID, Vernard Sprout, APRN - CNP, 100 mg at 29/15/60 2628    folic acid (FOLVITE) tablet 1 mg, 1 mg, Oral, Daily, Vernard Sprout, APRN - CNP, 1 mg at 11/25/21 1033    levothyroxine (SYNTHROID) tablet 50 mcg, 50 mcg, Oral, Daily, Vernard Sprout, APRN - CNP, 50 mcg at 11/25/21 0507    [Held by provider] lisinopril (PRINIVIL;ZESTRIL) tablet 5 mg, 5 mg, Oral, Daily, Vernard Sprout, APRN - CNP, 5 mg at 11/17/21 1430    metoprolol succinate (TOPROL XL) extended release tablet 50 mg, 50 mg, Oral, Daily, Vernard Sprout, APRN - CNP, 50 mg at 11/25/21 1033    predniSONE (DELTASONE) tablet 5 mg, 5 mg, Oral, Daily, Vernard Sprout, APRN - CNP, 5 mg at 11/25/21 1034    atorvastatin (LIPITOR) tablet 10 mg, 10 mg, Oral, Daily, Vernard Sprout, APRN - CNP, 10 mg at 11/25/21 1033    ondansetron (ZOFRAN-ODT) disintegrating tablet 4 mg, 4 mg, Oral, Q8H PRN **OR** ondansetron (ZOFRAN) injection 4 mg, 4 mg, IntraVENous, Q6H PRN, Vernard Sprout, APRN - CNP    acetaminophen (TYLENOL) tablet 650 mg, 650 mg, Oral, Q6H PRN, 650 mg at 11/17/21 2005 **OR** acetaminophen (TYLENOL) suppository 650 mg, 650 mg, Rectal, Q6H PRN, Vernard Sprout, APRN - CNP    polyethylene glycol (GLYCOLAX) packet 17 g, 17 g, Oral, Daily PRN, Severo Friends, APRN - CNP    heparin (porcine) injection 5,000 Units, 5,000 Units, SubCUTAneous, 3 times per day, Severo Friends, APRN - CNP, 5,000 Units at 21 0507    ipratropium-albuterol (DUONEB) nebulizer solution 1 ampule, 1 ampule, Inhalation, Q4H WA, Severo Friends, APRN - CNP, 1 ampule at 21 5900  ALLERGIES     Macrodantin [nitrofurantoin macrocrystal] and Sulfa antibiotics  Immunization History   Administered Date(s) Administered    COVID-19, Pfizer, PF, 30mcg/0.3mL 2021, 2021, 2021      Internal Administration   First Dose COVID-19, Pfizer, PF, 30mcg/0.3mL  2021   Second Dose COVID-19, Pfizer, PF, 30mcg/0.3mL   2021       Last COVID Lab SARS-CoV-2 (no units)   Date Value   2021 Not Detected     SARS-CoV-2, PCR (no units)   Date Value   2021 Not Detected     SARS-CoV-2, NAAT (no units)   Date Value   2021 Not Detected        PAST MEDICAL HISTORY     Past Medical History:   Diagnosis Date    Abnormal EKG     left bundle branch block    Acute gout involving toe of right foot 9/3/2020    Acute gout involving toe of right foot 9/3/2020    Acute on chronic combined systolic (congestive) and diastolic (congestive) heart failure (HCC)     Cancer (HCC)     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    Cerebrovascular disease     Clotting disorder ([address] 1985    thrombophlebitis after c section delivery    Depression     15 years followed by dr Akanksha Calvert Hyperlipidemia     Hypertension     Hyperthyroidism     Leg swelling 9/3/2020    Lymphedema of both lower extremities 9/3/2020    Peripheral vascular disease ([address]     Renal failure 2012    renal transplant    Thrombophlebitis     after c section delivery    Thyroid disease      SURGICAL HISTORY       Past Surgical History:   Procedure Laterality Date     SECTION  1985    one normal delivery    COLONOSCOPY      DIAGNOSTIC CARDIAC CATH LAB PROCEDURE   normal coronaries and 1996 normal coronaries    DIALYSIS FISTULA CREATION  march and july 2012    phase one and two patient will start dialysis when needed   [address]    transfemoral venous bypass grafts    KIDNEY TRANSPLANT  2016    KIDNEY TRANSPLANT  2015    KIDNEY TRANSPLANT  2015    PARATHYROIDECTOMY  2006    left parathyroid  implant and parathyroidectomy     PHYSICAL EXAM          Vitals:    11/24/21 2300 11/24/21 2324 11/25/21 0505 11/25/21 0659   BP:   124/76    Pulse: 85  80    Resp:  20 20    Temp:   98.5 °F (36.9 °C)    TempSrc:   Oral    SpO2:   96% 97%   Weight:       Height:         Physical Exam   Constitutional/General:  1L in bed awake and  Head: NC/AT  Eyes: PERRL, EOMI  Pulmonary: Lungs DEC BS ANT   No wheeze  Cardiovascular:  S1/S2   Abdomen: Soft, + BS.    distension. Nontender. Extremities: Moves all extremities x 4.   ++ edema   Skin: Warm and dry  NO RASH   Neurologic:    No focal deficits follow commands  PIV  Chin yellow      DIAGNOSTIC RESULTS   RADIOLOGY:   XR CHEST (2 VW)    Result Date: 11/17/2021  EXAMINATION: TWO XRAY VIEWS OF THE CHEST 11/17/2021 9:31 am COMPARISON: None. HISTORY: ORDERING SYSTEM PROVIDED HISTORY: elevated BNP, wheezing TECHNOLOGIST PROVIDED HISTORY: Reason for exam:->elevated BNP, wheezing FINDINGS: There is prominence of the pulmonary vasculature. There is no effusion or pneumothorax. The cardiomediastinal silhouette is stable in size. The osseous structures are without acute process. Mild pulmonary vascular congestion. XR CHEST 1 VIEW    Result Date: 11/17/2021  EXAMINATION: ONE XRAY VIEW OF THE CHEST 11/17/2021 5:17 am COMPARISON: 2nd June 2020 HISTORY: ORDERING SYSTEM PROVIDED HISTORY: sepsis TECHNOLOGIST PROVIDED HISTORY: Reason for exam:->sepsis FINDINGS: Heart is mildly enlarged. Pulmonary vascularity is normal.  Lungs are clear. Neither costophrenic angle is blunted. Mild cardiomegaly.      LABS  Recent Labs 11/23/21  0858   WBC 11.1   HGB 11.9   HCT 39.6   .7*        Recent Labs     11/23/21  0858 11/23/21  0858 11/24/21  0901 11/24/21  0901 11/25/21  0546     --  141  --  142   K 4.3   < > 4.0   < > 3.9      < > 105   < > 104   CO2 23   < > 27   < > 27   BUN 32*  --  24*  --  20   CREATININE 1.1*  --  1.2*  --  1.0   GFRAA >60  --  54  --  >60   LABGLOM 50  --  45  --  56   GLUCOSE 92  --  95  --  94   PROT 6.2*  --  6.2*  --  5.7*   LABALBU 3.0*  --  3.1*  --  3.1*   CALCIUM 10.4*  --  10.6*  --  10.1   BILITOT 0.8  --  0.9  --  1.0   ALKPHOS 51  --  57  --  56   AST 19  --  16  --  14   ALT 15  --  14  --  15    < > = values in this interval not displayed. Lab Results   Component Value Date    COVID19 Not Detected 11/17/2021     COVID-19/MARINA-COV2 LABS  Recent Labs     11/23/21  0858 11/24/21  0901 11/25/21  0546   PROCAL 0.44* 0.28*  --    AST 19 16 14   ALT 15 14 15     MICROBIOLOGY:     Cultures :   Lab Results   Component Value Date    BC 5 Days no growth 11/17/2021    BC  12/17/2019     Refer to previous culture of CXBLD from 12-17-19 @1120 for  susceptibility results      ORG Klebsiella pneumoniae ssp pneumoniae 11/17/2021    ORG Staphylococcus coagulase-negative 11/17/2021    ORG Klebsiella pneumoniae ssp pneumoniae 11/17/2021     Lab Results   Component Value Date    BLOODCULT2  11/17/2021     This organism was isolated in one set. Susceptibility testing is not routinely done as this  organism frequently represents skin contamination. Additional testing can be ordered by calling the  Microbiology Department.       ORG Klebsiella pneumoniae ssp pneumoniae 11/17/2021    ORG Staphylococcus coagulase-negative 11/17/2021    ORG Klebsiella pneumoniae ssp pneumoniae 11/17/2021     Urine Culture, Routine   Date Value Ref Range Status   11/17/2021 <10,000 CFU/mL  Mixed gram positive organisms   (A)  Final   11/17/2021 >100,000 CFU/ml  Final   04/09/2021   Final    10 to 100,000 CFU/mL  Mixed rubio isolated. Further workup and sensitivity testing  is not routinely indicated and will not be performed. Mixed rubio isolated includes:  Escherichia coli  Mixed gram positive organisms  Alpha hemolytic Strep species  Yeast  Nonhemolytic Strep species       FINAL IMPRESSION    Patient is a 73 y.o. female who presented with   Chief Complaint   Patient presents with    Urinary Tract Infection    and admitted for Sepsis due to urinary tract infection ([facility] [A41.9, N39.0]  Urinary tract infection without hematuria, site unspecified [N39.0]  Sepsis, due to unspecified organism, unspecified whether acute organ dysfunction present ([facility] [A41.9]  · Fevers BETTER  · Leukocytosis  BETTER  · UTI/ dysuria  MIXED GPO/KLEBSIELLA  · Gn septicemia/prob pyelonephritis   · History of kidney transplant  cr1  · Rule out viral infection   resp viral panel neg s/p LP neg  · ?aspiration   · Immunocompromised patient S/P LP SUGGEST ASEPTIC WITH 100 MONOCYTES    Plan:   · Continue zosyn day 4/8 (prob narrow to ceftriaxone to complete 2 weeks  rx for bacteremia )  · For PICC line placement  · procal- 0.44  · Monitor labs - watch creat   · Patient is for discharge to [facility]  · Med rec done         Imaging and labs were reviewed. Godwin Mask was informed of their diagnosis, indications, risks and benefits of treatment. Godwin Mask had the opportunity to ask questions. All questions were answered. Thank you for involving me in the care of Godwin Mask. Please do not hesitate to call for any questions or concerns.     Electronically signed by Brittany Hendrix MD on 11/25/2021 at 11:31 AM    Phone [phone]  Fax [phone]

## 2021-11-26 LAB
ALBUMIN SERPL-MCNC: 3.1 G/DL (ref 3.5–5.2)
ALP BLD-CCNC: 57 U/L (ref 35–104)
ALT SERPL-CCNC: 18 U/L (ref 0–32)
ANION GAP SERPL CALCULATED.3IONS-SCNC: 11 MMOL/L (ref 7–16)
AST SERPL-CCNC: 19 U/L (ref 0–31)
BILIRUB SERPL-MCNC: 0.8 MG/DL (ref 0–1.2)
BUN BLDV-MCNC: 19 MG/DL (ref 6–23)
CALCIUM SERPL-MCNC: 10 MG/DL (ref 8.6–10.2)
CHLORIDE BLD-SCNC: 104 MMOL/L (ref 98–107)
CO2: 28 MMOL/L (ref 22–29)
CREAT SERPL-MCNC: 1.2 MG/DL (ref 0.5–1)
GFR AFRICAN AMERICAN: 54
GFR NON-AFRICAN AMERICAN: 45 ML/MIN/1.73
GLUCOSE BLD-MCNC: 91 MG/DL (ref 74–99)
HERPES SIMPLEX VIRUS BY PCR: NOT DETECTED
HSV SOURCE: NORMAL
POTASSIUM SERPL-SCNC: 3.8 MMOL/L (ref 3.5–5)
SODIUM BLD-SCNC: 143 MMOL/L (ref 132–146)
TOTAL PROTEIN: 5.6 G/DL (ref 6.4–8.3)

## 2021-11-26 PROCEDURE — 94640 AIRWAY INHALATION TREATMENT: CPT

## 2021-11-26 PROCEDURE — 97530 THERAPEUTIC ACTIVITIES: CPT

## 2021-11-26 PROCEDURE — 1200000000 HC SEMI PRIVATE

## 2021-11-26 PROCEDURE — 6360000002 HC RX W HCPCS: Performed by: NURSE PRACTITIONER

## 2021-11-26 PROCEDURE — 6370000000 HC RX 637 (ALT 250 FOR IP): Performed by: NURSE PRACTITIONER

## 2021-11-26 PROCEDURE — 97530 THERAPEUTIC ACTIVITIES: CPT | Performed by: PHYSICAL THERAPIST

## 2021-11-26 PROCEDURE — 97110 THERAPEUTIC EXERCISES: CPT | Performed by: PHYSICAL THERAPIST

## 2021-11-26 PROCEDURE — 2700000000 HC OXYGEN THERAPY PER DAY

## 2021-11-26 PROCEDURE — 80053 COMPREHEN METABOLIC PANEL: CPT

## 2021-11-26 PROCEDURE — 36415 COLL VENOUS BLD VENIPUNCTURE: CPT

## 2021-11-26 PROCEDURE — 2580000003 HC RX 258: Performed by: NURSE PRACTITIONER

## 2021-11-26 PROCEDURE — 94660 CPAP INITIATION&MGMT: CPT

## 2021-11-26 PROCEDURE — 6360000002 HC RX W HCPCS: Performed by: SPECIALIST

## 2021-11-26 PROCEDURE — 2580000003 HC RX 258: Performed by: SPECIALIST

## 2021-11-26 PROCEDURE — 2580000003 HC RX 258: Performed by: INTERNAL MEDICINE

## 2021-11-26 PROCEDURE — 97535 SELF CARE MNGMENT TRAINING: CPT

## 2021-11-26 PROCEDURE — 6360000002 HC RX W HCPCS: Performed by: INTERNAL MEDICINE

## 2021-11-26 PROCEDURE — 92526 ORAL FUNCTION THERAPY: CPT | Performed by: SPEECH-LANGUAGE PATHOLOGIST

## 2021-11-26 RX ADMIN — LEVOTHYROXINE SODIUM 50 MCG: 0.05 TABLET ORAL at 06:07

## 2021-11-26 RX ADMIN — IPRATROPIUM BROMIDE AND ALBUTEROL SULFATE 1 AMPULE: .5; 2.5 SOLUTION RESPIRATORY (INHALATION) at 14:24

## 2021-11-26 RX ADMIN — SODIUM CHLORIDE, PRESERVATIVE FREE 10 ML: 5 INJECTION INTRAVENOUS at 09:40

## 2021-11-26 RX ADMIN — ACYCLOVIR SODIUM 700 MG: 50 INJECTION, SOLUTION INTRAVENOUS at 12:10

## 2021-11-26 RX ADMIN — BUDESONIDE 500 MCG: 0.5 INHALANT RESPIRATORY (INHALATION) at 17:55

## 2021-11-26 RX ADMIN — HEPARIN SODIUM 5000 UNITS: 5000 INJECTION INTRAVENOUS; SUBCUTANEOUS at 14:50

## 2021-11-26 RX ADMIN — DESVENLAFAXINE 50 MG: 50 TABLET, EXTENDED RELEASE ORAL at 17:46

## 2021-11-26 RX ADMIN — IPRATROPIUM BROMIDE AND ALBUTEROL SULFATE 1 AMPULE: .5; 2.5 SOLUTION RESPIRATORY (INHALATION) at 17:55

## 2021-11-26 RX ADMIN — SODIUM CHLORIDE: 9 INJECTION, SOLUTION INTRAVENOUS at 09:49

## 2021-11-26 RX ADMIN — IPRATROPIUM BROMIDE AND ALBUTEROL SULFATE 1 AMPULE: .5; 2.5 SOLUTION RESPIRATORY (INHALATION) at 05:08

## 2021-11-26 RX ADMIN — SODIUM CHLORIDE: 9 INJECTION, SOLUTION INTRAVENOUS at 17:34

## 2021-11-26 RX ADMIN — HEPARIN SODIUM 5000 UNITS: 5000 INJECTION INTRAVENOUS; SUBCUTANEOUS at 22:18

## 2021-11-26 RX ADMIN — ACYCLOVIR SODIUM 700 MG: 50 INJECTION, SOLUTION INTRAVENOUS at 00:00

## 2021-11-26 RX ADMIN — DOCUSATE SODIUM 100 MG: 100 CAPSULE ORAL at 20:52

## 2021-11-26 RX ADMIN — ARFORMOTEROL TARTRATE 15 MCG: 15 SOLUTION RESPIRATORY (INHALATION) at 17:55

## 2021-11-26 RX ADMIN — ALLOPURINOL 100 MG: 100 TABLET ORAL at 09:41

## 2021-11-26 RX ADMIN — BUDESONIDE 500 MCG: 0.5 INHALANT RESPIRATORY (INHALATION) at 05:08

## 2021-11-26 RX ADMIN — ARFORMOTEROL TARTRATE 15 MCG: 15 SOLUTION RESPIRATORY (INHALATION) at 05:08

## 2021-11-26 RX ADMIN — METOPROLOL SUCCINATE 50 MG: 50 TABLET, EXTENDED RELEASE ORAL at 09:40

## 2021-11-26 RX ADMIN — CYCLOSPORINE 100 MG: 25 CAPSULE, LIQUID FILLED ORAL at 09:40

## 2021-11-26 RX ADMIN — PIPERACILLIN SODIUM AND TAZOBACTAM SODIUM 3375 MG: 3; .375 INJECTION, POWDER, LYOPHILIZED, FOR SOLUTION INTRAVENOUS at 13:02

## 2021-11-26 RX ADMIN — SODIUM CHLORIDE: 9 INJECTION, SOLUTION INTRAVENOUS at 01:00

## 2021-11-26 RX ADMIN — FOLIC ACID 1 MG: 1 TABLET ORAL at 09:41

## 2021-11-26 RX ADMIN — PIPERACILLIN SODIUM AND TAZOBACTAM SODIUM 3375 MG: 3; .375 INJECTION, POWDER, LYOPHILIZED, FOR SOLUTION INTRAVENOUS at 20:51

## 2021-11-26 RX ADMIN — PIPERACILLIN SODIUM AND TAZOBACTAM SODIUM 3375 MG: 3; .375 INJECTION, POWDER, LYOPHILIZED, FOR SOLUTION INTRAVENOUS at 05:54

## 2021-11-26 RX ADMIN — Medication 300 UNITS: at 20:52

## 2021-11-26 RX ADMIN — CYCLOSPORINE 100 MG: 25 CAPSULE, LIQUID FILLED ORAL at 20:51

## 2021-11-26 RX ADMIN — PREDNISONE 5 MG: 5 TABLET ORAL at 09:41

## 2021-11-26 RX ADMIN — HEPARIN SODIUM 5000 UNITS: 5000 INJECTION INTRAVENOUS; SUBCUTANEOUS at 05:55

## 2021-11-26 RX ADMIN — Medication 300 UNITS: at 09:42

## 2021-11-26 RX ADMIN — ATORVASTATIN CALCIUM 10 MG: 10 TABLET, FILM COATED ORAL at 09:41

## 2021-11-26 ASSESSMENT — PAIN SCALES - GENERAL
PAINLEVEL_OUTOF10: 0

## 2021-11-26 ASSESSMENT — PAIN SCALES - WONG BAKER
WONGBAKER_NUMERICALRESPONSE: 0
WONGBAKER_NUMERICALRESPONSE: 0

## 2021-11-26 ASSESSMENT — PAIN DESCRIPTION - PROGRESSION: CLINICAL_PROGRESSION: NOT CHANGED

## 2021-11-26 NOTE — PROGRESS NOTES
[facility] MD Em Li MD Elza Shaver, MD. Mary Castillo MD   Progress Note    11/26/2021    SUBJECTIVE:   11/20 : Seen in her room , was sleeping on CPAP , no LE edema , skin wrinkling in LE . No JVD   Case d/w RN   UO good   Cr slightly better /stable     11/21 : seen today , on o2/nc more alert oriented . Weak . Cr down . No LE edema . Poor po intake . 11/22 : seen in her room , on RA , confused . Weak  Euvolemic , no LE edema . Poor po intake     11/23 : seen today , lying in bed on o2/nc . Weak , confused . No LE edema, appear euvolemic .    11/24 : seen in her room , poor po intake . O2/nc euvolemic , for mri today , remain confused , d/w Floor RN   11/26: Patient was seen and interviewed in room, she is awake alert and verbal oriented with no confusion. PROBLEM LIST:    Principal Problem:    Sepsis secondary to UTI [facility])  Active Problems:    Depression    Hypertension    Acute kidney injury superimposed on CKD (HCC)    Thyroid disease    Acute on chronic combined systolic (congestive) and diastolic (congestive) heart failure (HCC)  Resolved Problems:    * No resolved hospital problems. 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    Cerebrovascular disease     Clotting disorder [facility] 1985    thrombophlebitis after c section delivery    Depression     15 years followed by dr Palacio Severe Hyperlipidemia     Hypertension     Hyperthyroidism     Leg swelling 9/3/2020    Lymphedema of both lower extremities 9/3/2020    Peripheral vascular disease ([address]     Renal failure 11/2012    renal transplant    Thrombophlebitis     after c section delivery    Thyroid disease        Past Surgical History:   Procedure Laterality Date   [address]    one normal delivery    COLONOSCOPY      DIAGNOSTIC CARDIAC CATH LAB PROCEDURE  2006    normal coronaries and 1996 normal coronaries    DIALYSIS FISTULA CREATION  march and july 2012    phase one and two patient will start dialysis when needed   [address]    transfemoral venous bypass grafts    KIDNEY TRANSPLANT  2016   [facility]  2015   [facility]  2015    PARATHYROIDECTOMY  2006    left parathyroid  implant and parathyroidectomy       Family History   Problem Relation Age of Onset    Diabetes Mother     Diabetes Father     Dementia Father         reports that she quit smoking about 6 years ago. She has a 20.00 pack-year smoking history. She has never used smokeless tobacco. She reports that she does not drink alcohol and does not use drugs. Review of Systems:   Constitutional: no fevers , no chills , feels ok   Eyes: no eye pain , no itching , no drainage  Ears, nose, mouth, throat, and face: no ear ,nose pain , hearing is ok ,no nasal drainage   Respiratory: no sob ,no cough ,no wheezing . Cardiovascular: no chest pain , no palpitation ,no sob . Gastrointestinal: no nausea, vomiting , constipation , no abdominal pain . Genitourinary:no urinary retention , no burning , dysuria . No polyuria   Hematologic/lymphatic: no bleeding , no cougulation issues . Musculoskeletal:no joint pain , no swelling . Neurological: no headaches ,no weakness , no numbness . Endocrine: no thirst , no weight issues .      MEDS (scheduled):    sodium chloride flush  5-40 mL IntraVENous 2 times per day    heparin flush  3 mL IntraVENous 2 times per day    piperacillin-tazobactam  3,375 mg IntraVENous Q8H    Arformoterol Tartrate  15 mcg Nebulization BID    budesonide  500 mcg Nebulization BID    acyclovir  10 mg/kg (Adjusted) IntraVENous Q12H    allopurinol  100 mg Oral Daily    [Held by provider] cloZAPine  50 mg Oral Nightly    cycloSPORINE modified  100 mg Oral BID    desvenlafaxine succinate  50 mg Oral QPM    docusate sodium  100 mg Oral BID    folic acid  1 mg Oral Daily    levothyroxine  50 mcg Oral Daily    [Held by provider] lisinopril  5 mg Oral Daily    metoprolol succinate  50 mg Oral Daily    predniSONE  5 mg Oral Daily    atorvastatin  10 mg Oral Daily    heparin (porcine)  5,000 Units SubCUTAneous 3 times per day    ipratropium-albuterol  1 ampule Inhalation Q4H WA       MEDS (infusions):   sodium chloride      sodium chloride Stopped (11/26/21 1135)    sodium chloride 35 mL/hr at 11/24/21 2213       MEDS (prn):  sodium chloride flush, sodium chloride, heparin flush, ondansetron **OR** ondansetron, acetaminophen **OR** acetaminophen, polyethylene glycol    PHYSICAL EXAM:     Patient Vitals for the past 24 hrs:   BP Temp Temp src Pulse Resp SpO2   11/26/21 0827 (!) 157/64 98.2 °F (36.8 °C) Axillary 74 12 --   11/25/21 2305 (!) 141/67 98.8 °F (37.1 °C) Infrared 77 20 95 %   11/25/21 1904 130/74 98.6 °F (37 °C) Oral 82 18 97 %   @      Intake/Output Summary (Last 24 hours) at 11/26/2021 1325  Last data filed at 11/26/2021 0641  Gross per 24 hour   Intake 323 ml   Output 1600 ml   Net -1277 ml         Wt Readings from Last 3 Encounters:   11/17/21 227 lb 3.2 oz (103.1 kg)   08/16/21 229 lb (103.9 kg)   10/01/20 245 lb 8 oz (111.4 kg)       Constitutional:  in no acute distress  Oral: mucus membranes moist  Neck: no JVD  Cardiovascular: S1, S2 regular rhythm, no murmur,or rub  Respiratory:  No crackles, no wheeze  Gastrointestinal:  Soft, nontender, nondistended, NABS  Ext: no edema, feet warm  Skin: dry, no rash  Neuro: awake, alert, interactive      DATA:    No results for input(s): WBC, HGB, HCT, MCV, PLT in the last 72 hours. Recent Labs     11/24/21  0901 11/25/21  0546 11/26/21  0540    142 143   K 4.0 3.9 3.8    104 104   CO2 27 27 28   BUN 24* 20 19   CREATININE 1.2* 1.0 1.2*   ALT 14 15 18   AST 16 14 19   BILITOT 0.9 1.0 0.8   ALKPHOS 57 56 57       No results found for: LABPROT    ASSESSMENT / RECOMMENDATIONS:      1. Acute kidney injury felt to be related to volume depletion in  context of urosepsis. Her BUN is 19 and creatinine is 1.2  2. Chronic kidney disease, stage 2 in the patient with kidney  transplant. Baseline _____ function with a creatinine of 1.1 to 1.3.  3. Immunosuppressant status, chronically on CellCept 1 gm b.i.d.,  cyclosporine 100 mg b.i.d., and prednisone. 4.  Urosepsis with bacteremia management per ID service.     PLAN:      Cr down to 1.1 (close to baseline )  UO ok          -continue NS at conservative rate 35 cc/hr for nuritition purpose  -hold MMF for now in light of bacteremia .  Continue prograf and prednison   -encourage po intake   -PT/OT   -f/u serial bmp , UO         Electronically signed by Raine Cruz MD on 11/26/2021 at 1:25 PM

## 2021-11-26 NOTE — CARE COORDINATION
SS NOTE: COVID NEGATIVE 11/17, PT WILL NEED A RAPID NEGATIVE COVID TO GO TO [facility]. SW recd SS Consult:\". ..rehab plcement and discharge summary. Gloria Boyer \" Pt is on 3 liters of O2. PICC is in place. PT/OT are on. [facility] has accepted pt for admission. For the SNF placement pt will need NO PRECERT needed, pt will need a signed GINA, current PT/OT notes and SW will need the written dch order to complete the HENs. AG Lynn.11/26/2021.11:55 AM.

## 2021-11-26 NOTE — PROGRESS NOTES
SPEECH LANGUAGE PATHOLOGY  DAILY PROGRESS NOTE        PATIENT NAME:  Abigail Fitch      :  1955          TODAY'S DATE:  2021 ROOM:  [address] [place] [address]    Patient seen for 30 minute bedside session this date. Patient tired but cooperative. We reviewed all safe swallow protocol and she recalled with good performance. Patient's diet order continues as 'nectar' thick instead of the thin that was recommended at time of video swallow study. Discussed this with nursing who will have it changed per physician approval.  Patient tolerated controlled small sip of thin via straw and single cup sips today with no overt s/s of distress. She was instructed and nursing was informed that she is safe with small sips of thin and controlled small single sips via straw if able. No continuous sips; no large sips. Patient expressed understanding. Will continue per poc. Isai Ndiaye.  SHADI Luna/[facility]-SLP  [id number]    CPT code(s) 28829  dysphagia tx  Total minutes :  30 minutes

## 2021-11-26 NOTE — PLAN OF CARE
Problem: Pain:  Goal: Pain level will decrease  Description: Pain level will decrease  11/26/2021 0334 by Giancarlo Drain, RN  Outcome: Met This Shift  11/26/2021 0333 by Giancarlo Drain, RN  Outcome: Met This Shift  Goal: Control of acute pain  Description: Control of acute pain  11/26/2021 0334 by Giancarlo Drain, RN  Outcome: Met This Shift  11/26/2021 0333 by Giancarlo Drain, RN  Outcome: Met This Shift  Goal: Control of chronic pain  Description: Control of chronic pain  11/26/2021 0334 by Giancarlo Drain, RN  Outcome: Met This Shift  11/26/2021 0333 by Giancarlo Drain, RN  Outcome: Met This Shift     Problem: Falls - Risk of:  Goal: Will remain free from falls  Description: Will remain free from falls  11/26/2021 0334 by Giancarlo Drain, RN  Outcome: Met This Shift  11/26/2021 0333 by Giancarlo Drain, RN  Outcome: Met This Shift  Goal: Absence of physical injury  Description: Absence of physical injury  11/26/2021 0334 by Giancarlo Drain, RN  Outcome: Met This Shift  11/26/2021 0333 by Giancarlo Drain, RN  Outcome: Met This Shift     Problem: Skin Integrity:  Goal: Will show no infection signs and symptoms  Description: Will show no infection signs and symptoms  11/26/2021 0334 by Giancarlo Drain, RN  Outcome: Met This Shift  11/26/2021 0333 by Giancarlo Drain, RN  Outcome: Met This Shift  Goal: Absence of new skin breakdown  Description: Absence of new skin breakdown  11/26/2021 0334 by Giancarlo Drain, RN  Outcome: Met This Shift  11/26/2021 0333 by Giancarlo Drain, RN  Outcome: Met This Shift     Problem: Self-Care:  Goal: Ability to participate in self-care as condition permits will improve  Description: Ability to participate in self-care as condition permits will improve  11/26/2021 0334 by Giancarlo Drain, RN  Outcome: Met This Shift  11/26/2021 0333 by Giancarlo Drain, RN  Outcome: Met This Shift

## 2021-11-26 NOTE — PROGRESS NOTES
[facility]     [address] [place] [address], [address] [place] [address]  Phone [phone]   [phone]      Admit Date: 2021  4:50 AM  Pt Name: Angelina Tenorio  MRN: [id number]  : 1955  Reason for Consult:    Chief Complaint   Patient presents with    Urinary Tract Infection     Requesting Physician:  Tanja Kiran MD  PCP: Tanja Kiran MD  History Obtained From:  patient, chart   ID consulted for Sepsis due to urinary tract infection ([facility] [A41.9, N39.0]  Urinary tract infection without hematuria, site unspecified [N39.0]  Sepsis, due to unspecified organism, unspecified whether acute organ dysfunction present ([address] [A41.9]  on hospital day 9   Face to face encounter   CHIEF COMPLAINT       Chief Complaint   Patient presents with    Urinary Tract Infection     HISTORYOF PRESENT ILLNESS   Angelina Tenorio is a 73 y.o. female who presents with   has a past medical history of Abnormal EKG, Acute gout involving toe of right foot, Acute gout involving toe of right foot, Acute on chronic combined systolic (congestive) and diastolic (congestive) heart failure ([address] Cancer ([address] Cerebrovascular disease, Clotting disorder ([address] Depression, Hyperlipidemia, Hypertension, Hyperthyroidism, Leg swelling, Lymphedema of both lower extremities, Peripheral vascular disease ([address] Renal failure, Thrombophlebitis, and Thyroid disease. Urinary Tract Infection        Patient admitted to hospital from home. She was weak, reports dysuria, and generalized weakness. She has been having fever and chills at home. She was hypoxic at home- 88 %. She has a cough. She was admitted for further evaluation. She is known to ID service- she was last seen in 2019-for a klebsiella UTI- was discharge with cefepime due to tachycardia and prolonged QT with cipro.      She is currently on IV cefepime  Her temp has been as high as 101.7   She has a cough - and is tachypnic     ID has been asked to evaluate and manage atbs    Current visit  Agnieszka Wesley  11/26/21   PT IN BED more awake and alert has no c/o f/c/n/v/d  Answers approrpiately    afebrile  On 3 Liters     REVIEW OF SYSTEMS     CONSTITUTIONAL:   AS IN HPI   CURRENT MEDICATIONS     Current Facility-Administered Medications:     sodium chloride flush 0.9 % injection 5-40 mL, 5-40 mL, IntraVENous, 2 times per day, Bishop Franco MD, 10 mL at 11/26/21 0940    sodium chloride flush 0.9 % injection 5-40 mL, 5-40 mL, IntraVENous, PRN, Edmundo Ramirez MD    0.9 % sodium chloride infusion, 25 mL, IntraVENous, PRN, Edmundo Ramirez MD    heparin flush 100 UNIT/ML injection 300 Units, 3 mL, IntraVENous, 2 times per day, Bishop Franco MD, 300 Units at 11/26/21 0942    heparin flush 100 UNIT/ML injection 300 Units, 3 mL, IntraCATHeter, PRN, Edmundo Ramirez MD    piperacillin-tazobactam (ZOSYN) 3,375 mg in dextrose 5 % 50 mL IVPB extended infusion (mini-bag), 3,375 mg, IntraVENous, Q8H, Jean Carlos Denis MD, Stopped at 11/26/21 0949    0.9 % sodium chloride infusion, , IntraVENous, Q8H, Jean Carlos Denis MD, Stopped at 11/26/21 1135    Arformoterol Tartrate (BROVANA) nebulizer solution 15 mcg, 15 mcg, Nebulization, BID, KATINA Gonzalez CNP, 15 mcg at 11/26/21 0508    budesonide (PULMICORT) nebulizer suspension 500 mcg, 500 mcg, Nebulization, BID, KATINA Gonzalez - CNP, 500 mcg at 11/26/21 0508    acyclovir (ZOVIRAX) 700 mg in dextrose 5 % 100 mL IVPB, 10 mg/kg (Adjusted), IntraVENous, Q12H, KATINA Gonzalez CNP, Last Rate: 100 mL/hr at 11/26/21 1210, 700 mg at 11/26/21 1210    0.9 % sodium chloride infusion, , IntraVENous, Continuous, Agnieszka Fuller MD, Last Rate: 35 mL/hr at 11/24/21 2213, New Bag at 11/24/21 2213    allopurinol (ZYLOPRIM) tablet 100 mg, 100 mg, Oral, Daily, KATINA Gonzalez - CNP, 100 mg at 11/26/21 0941    [Held by provider] cloZAPine (CLOZARIL) tablet 50 mg, 50 mg, Oral, Nightly, [place] Belts, APRN - CNP, 50 mg at 11/19/21 2213    cycloSPORINE modified (NEORAL) capsule 100 mg, 100 mg, Oral, BID, [place] Belts, APRN - CNP, 100 mg at 11/26/21 0940    desvenlafaxine succinate (PRISTIQ) extended release tablet 50 mg, 50 mg, Oral, QPM, [place] Belts, APRN - CNP, 50 mg at 11/25/21 2201    docusate sodium (COLACE) capsule 100 mg, 100 mg, Oral, BID, [place] Belts, APRN - CNP, 100 mg at 81/63/13 9667    folic acid (FOLVITE) tablet 1 mg, 1 mg, Oral, Daily, [place] Belts, APRN - CNP, 1 mg at 11/26/21 0941    levothyroxine (SYNTHROID) tablet 50 mcg, 50 mcg, Oral, Daily, [place] Belts, APRN - CNP, 50 mcg at 11/26/21 0607    [Held by provider] lisinopril (PRINIVIL;ZESTRIL) tablet 5 mg, 5 mg, Oral, Daily, [place] Belts, APRN - CNP, 5 mg at 11/17/21 1430    metoprolol succinate (TOPROL XL) extended release tablet 50 mg, 50 mg, Oral, Daily, [place] Belts, APRN - CNP, 50 mg at 11/26/21 0940    predniSONE (DELTASONE) tablet 5 mg, 5 mg, Oral, Daily, [place] Belts, APRN - CNP, 5 mg at 11/26/21 0941    atorvastatin (LIPITOR) tablet 10 mg, 10 mg, Oral, Daily, [place] Belts, APRN - CNP, 10 mg at 11/26/21 0941    ondansetron (ZOFRAN-ODT) disintegrating tablet 4 mg, 4 mg, Oral, Q8H PRN **OR** ondansetron (ZOFRAN) injection 4 mg, 4 mg, IntraVENous, Q6H PRN, [place] Belts, APRN - CNP    acetaminophen (TYLENOL) tablet 650 mg, 650 mg, Oral, Q6H PRN, 650 mg at 11/17/21 2005 **OR** acetaminophen (TYLENOL) suppository 650 mg, 650 mg, Rectal, Q6H PRN, KATINA Goldstein - CNP    polyethylene glycol (GLYCOLAX) packet 17 g, 17 g, Oral, Daily PRN, KATINA Goldstein - CNP    heparin (porcine) injection 5,000 Units, 5,000 Units, SubCUTAneous, 3 times per day, [place] KATINA Monzon CNP, 5,000 Units at 11/26/21 0555    ipratropium-albuterol (DUONEB) nebulizer solution 1 ampule, 1 ampule, Inhalation, Q4H WA, KATINA Goldstein CNP, 1 ampule at 11/26/21 8626  ALLERGIES Macrodantin [nitrofurantoin macrocrystal] and Sulfa antibiotics  Immunization History   Administered Date(s) Administered    COVID-19, Rj Hernandez, PF, 30mcg/0.3mL 01/28/2021, 02/18/2021, 08/18/2021      Internal Administration   First Dose COVID-19, Pfizer, PF, 30mcg/0.3mL  01/28/2021   Second Dose COVID-19, Pfizer, PF, 30mcg/0.3mL   02/18/2021       Last COVID Lab SARS-CoV-2 (no units)   Date Value   01/20/2021 Not Detected     SARS-CoV-2, PCR (no units)   Date Value   11/17/2021 Not Detected     SARS-CoV-2, NAAT (no units)   Date Value   11/17/2021 Not Detected        PAST MEDICAL HISTORY     Past Medical History:   Diagnosis Date    Abnormal EKG     left bundle branch block    Acute gout involving toe of right foot 9/3/2020    Acute gout involving toe of right foot 9/3/2020    Acute on chronic combined systolic (congestive) and diastolic (congestive) heart failure (HCC)     Cancer (HCC)     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    Cerebrovascular disease     Clotting disorder ([address] 1985    thrombophlebitis after c section delivery    Depression     15 years followed by dr Joshua Stanton    Hyperlipidemia     Hypertension     Hyperthyroidism     Leg swelling 9/3/2020    Lymphedema of both lower extremities 9/3/2020    Peripheral vascular disease ([address]     Renal failure 11/2012    renal transplant    Thrombophlebitis     after c section delivery    Thyroid disease      SURGICAL HISTORY       Past Surgical History:   Procedure Laterality Date   [address]    one normal delivery    COLONOSCOPY      DIAGNOSTIC CARDIAC CATH LAB PROCEDURE  2006    normal coronaries and 1996 normal coronaries    DIALYSIS FISTULA CREATION  march and july 2012    phase one and two patient will start dialysis when needed   [address]    transfemoral venous bypass grafts    KIDNEY TRANSPLANT  2016   [facility]  2015   [facility]  2015    PARATHYROIDECTOMY  2006    left parathyroid  implant and parathyroidectomy     PHYSICAL EXAM          Vitals:    11/25/21 0659 11/25/21 1904 11/25/21 2305 11/26/21 0827   BP:  130/74 (!) 141/67 (!) 157/64   Pulse:  82 77 74   Resp:  18 20 12   Temp:  98.6 °F (37 °C) 98.8 °F (37.1 °C) 98.2 °F (36.8 °C)   TempSrc:  Oral Infrared Axillary   SpO2: 97% 97% 95%    Weight:       Height:         Physical Exam   Constitutional/General:  1L in bed awake and  Head: NC/AT  Eyes: PERRL, EOMI  Pulmonary: Lungs DEC BS ANT   No wheeze  Cardiovascular:  S1/S2   Abdomen: Soft, + BS.    distension. Nontender. Extremities: Moves all extremities x 4.   ++ edema   Skin: Warm and dry  NO RASH   Neurologic:    No focal deficits follow commands  PIV  Chin yellow      DIAGNOSTIC RESULTS   RADIOLOGY:   XR CHEST (2 VW)    Result Date: 11/17/2021  EXAMINATION: TWO XRAY VIEWS OF THE CHEST 11/17/2021 9:31 am COMPARISON: None. HISTORY: ORDERING SYSTEM PROVIDED HISTORY: elevated BNP, wheezing TECHNOLOGIST PROVIDED HISTORY: Reason for exam:->elevated BNP, wheezing FINDINGS: There is prominence of the pulmonary vasculature. There is no effusion or pneumothorax. The cardiomediastinal silhouette is stable in size. The osseous structures are without acute process. Mild pulmonary vascular congestion. XR CHEST 1 VIEW    Result Date: 11/17/2021  EXAMINATION: ONE XRAY VIEW OF THE CHEST 11/17/2021 5:17 am COMPARISON: 2nd June 2020 HISTORY: ORDERING SYSTEM PROVIDED HISTORY: sepsis TECHNOLOGIST PROVIDED HISTORY: Reason for exam:->sepsis FINDINGS: Heart is mildly enlarged. Pulmonary vascularity is normal.  Lungs are clear. Neither costophrenic angle is blunted. Mild cardiomegaly. LABS  No results for input(s): WBC, HGB, HCT, MCV, PLT in the last 72 hours.   Recent Labs     11/24/21  0901 11/24/21  0901 11/25/21  0546 11/25/21  0546 11/26/21  0540     --  142  --  143   K 4.0   < > 3.9   < > 3.8      < > 104   < > 104   CO2 27   < > 27   < > 28   BUN 24*  -- 20  --  19   CREATININE 1.2*  --  1.0  --  1.2*   GFRAA 54  --  >60  --  54   LABGLOM 45  --  56  --  45   GLUCOSE 95  --  94  --  91   PROT 6.2*  --  5.7*  --  5.6*   LABALBU 3.1*  --  3.1*  --  3.1*   CALCIUM 10.6*  --  10.1  --  10.0   BILITOT 0.9  --  1.0  --  0.8   ALKPHOS 57  --  56  --  57   AST 16  --  14  --  19   ALT 14  --  15  --  18    < > = values in this interval not displayed. Lab Results   Component Value Date    COVID19 Not Detected 11/17/2021     COVID-19/MARINA-COV2 LABS  Recent Labs     11/24/21  0901 11/25/21  0546 11/26/21  0540   PROCAL 0.28*  --   --    AST 16 14 19   ALT 14 15 18     MICROBIOLOGY:     Cultures :   Lab Results   Component Value Date    BC 5 Days no growth 11/17/2021    BC  12/17/2019     Refer to previous culture of CXBLD from 12-17-19 @1120 for  susceptibility results      ORG Klebsiella pneumoniae ssp pneumoniae 11/17/2021    ORG Staphylococcus coagulase-negative 11/17/2021    ORG Klebsiella pneumoniae ssp pneumoniae 11/17/2021     Lab Results   Component Value Date    BLOODCULT2  11/17/2021     This organism was isolated in one set. Susceptibility testing is not routinely done as this  organism frequently represents skin contamination. Additional testing can be ordered by calling the  Microbiology Department. ORG Klebsiella pneumoniae ssp pneumoniae 11/17/2021    ORG Staphylococcus coagulase-negative 11/17/2021    ORG Klebsiella pneumoniae ssp pneumoniae 11/17/2021     Urine Culture, Routine   Date Value Ref Range Status   11/17/2021 <10,000 CFU/mL  Mixed gram positive organisms   (A)  Final   11/17/2021 >100,000 CFU/ml  Final   04/09/2021   Final    10 to 100,000 CFU/mL  Mixed rubio isolated. Further workup and sensitivity testing  is not routinely indicated and will not be performed.   Mixed rubio isolated includes:  Escherichia coli  Mixed gram positive organisms  Alpha hemolytic Strep species  Yeast  Nonhemolytic Strep species       FINAL IMPRESSION    Patient is a 73 y.o. female who presented with   Chief Complaint   Patient presents with    Urinary Tract Infection    and admitted for Sepsis due to urinary tract infection ([facility] [A41.9, N39.0]  Urinary tract infection without hematuria, site unspecified [N39.0]  Sepsis, due to unspecified organism, unspecified whether acute organ dysfunction present ([facility] [A41.9]  · Fevers BETTER  · Leukocytosis  BETTER  · UTI/ dysuria  MIXED GPO/KLEBSIELLA  · Gn septicemia/prob pyelonephritis   · History of kidney transplant  cr1  · Rule out viral infection   resp viral panel neg s/p LP neg  · ?aspiration   · Immunocompromised patient S/P LP SUGGEST ASEPTIC WITH 100 MONOCYTES    Plan:   · Continue zosyn day 5/8 (prob narrow to ceftriaxone to complete 2 weeks  rx for bacteremia )  · PICC 11/24 rue  · Stop acyclvoir  · procal- 0.44  · Monitor labs - watch creat   · Patient is for discharge to [facility]  · Med rec done         Imaging and labs were reviewed. Conrad Armendariz was informed of their diagnosis, indications, risks and benefits of treatment. Conrad Armendariz had the opportunity to ask questions. All questions were answered. Thank you for involving me in the care of Conrad Armendariz. Please do not hesitate to call for any questions or concerns.     Electronically signed by Radha Feliciano MD on 11/26/2021 at 12:13 PM    Phone [phone]  Fax [phone]

## 2021-11-26 NOTE — PROGRESS NOTES
Progress Note    Admitting Date and Time: 11/17/2021  4:50 AM  Admit Dx: Sepsis due to urinary tract infection ([facility] [A41.9, N39.0]  Urinary tract infection without hematuria, site unspecified [N39.0]  Sepsis, due to unspecified organism, unspecified whether acute organ dysfunction present ([facility] [A41.9]    Subjective:    She is alert ,  Oriented x 3 , feels and looks much better , eating breakfast     ROS: denies  n/v, HA unless stated above.      sodium chloride flush  5-40 mL IntraVENous 2 times per day    heparin flush  3 mL IntraVENous 2 times per day    piperacillin-tazobactam  3,375 mg IntraVENous Q8H    Arformoterol Tartrate  15 mcg Nebulization BID    budesonide  500 mcg Nebulization BID    acyclovir  10 mg/kg (Adjusted) IntraVENous Q12H    allopurinol  100 mg Oral Daily    [Held by provider] cloZAPine  50 mg Oral Nightly    cycloSPORINE modified  100 mg Oral BID    desvenlafaxine succinate  50 mg Oral QPM    docusate sodium  100 mg Oral BID    folic acid  1 mg Oral Daily    levothyroxine  50 mcg Oral Daily    [Held by provider] lisinopril  5 mg Oral Daily    metoprolol succinate  50 mg Oral Daily    predniSONE  5 mg Oral Daily    atorvastatin  10 mg Oral Daily    heparin (porcine)  5,000 Units SubCUTAneous 3 times per day    ipratropium-albuterol  1 ampule Inhalation Q4H WA     sodium chloride flush, 5-40 mL, PRN  sodium chloride, 25 mL, PRN  heparin flush, 3 mL, PRN  ondansetron, 4 mg, Q8H PRN   Or  ondansetron, 4 mg, Q6H PRN  acetaminophen, 650 mg, Q6H PRN   Or  acetaminophen, 650 mg, Q6H PRN  polyethylene glycol, 17 g, Daily PRN         Objective:    BP (!) 157/64   Pulse 74   Temp 98.2 °F (36.8 °C) (Axillary)   Resp 12   Ht 5' 2\" (1.575 m)   Wt 227 lb 3.2 oz (103.1 kg) Comment: bed scale  SpO2 95%   BMI 41.56 kg/m²     General Appearance:  Sitting  in bed , AAAox3    Head: normocephalic and atraumatic  Eyes: pupils equal, round, and reactive to light, conjunctivae normal from large body habitus         NM LUNG VENT/PERFUSION (VQ)   Final Result   Low probability for pulmonary embolus. US DUP LOWER EXTREMITIES BILATERAL VENOUS   Final Result   No evidence of DVT in either lower extremity. CT ABDOMEN PELVIS WO CONTRAST Additional Contrast? None   Final Result   Limited evaluation for pyelonephritis given lack of IV contrast.  Allowing   for this limitation, the findings are as follows: The transplant kidney demonstrates perinephric stranding, which can be seen   in the setting of infection/inflammation. No hydronephrosis. Atrophic native kidneys with multiple cysts. Recommend nonemergent renal   ultrasound for further evaluation of an intermediate density structure in the   right kidney. Mildly dilated loops of proximal small bowel are noted, with decompressed   distal small bowel but no discrete transition point. This is favored to   reflect ileus, however obstruction cannot be excluded. Recommend monitoring   of bowel function and follow-up KUBs. Cardiomegaly. US RETROPERITONEAL COMPLETE   Final Result   TRANSPLANT KIDNEY: No evidence of hydronephrosis. Unremarkable echogenicity. NATIVE KIDNEYS: Findings of chronic medical renal disease. No evidence of   hydronephrosis. Small left renal cysts. XR CHEST (2 VW)   Final Result   Mild pulmonary vascular congestion. XR CHEST 1 VIEW   Final Result   Mild cardiomegaly. Assessment:  Principal Problem:    Sepsis secondary to UTI [facility])  Active Problems:    Depression    Hypertension    Acute kidney injury superimposed on CKD ([facility]    Thyroid disease    Acute on chronic combined systolic (congestive) and diastolic (congestive) heart failure (HCC)  Resolved Problems:    * No resolved hospital problems. *      Plan:  1.  Sepsis secondary to Pyelonephritis/UTI:  WBC 13.5, Temp 101.7, heart rate 107, Urinalysis was positive for a UTI and blood cultures are positive for klebsiella   Urine culture growing Klebsiella pneuomoniae. CT of the abd/pelvis showed perinephric stranding in transplant kidney. ID following. Continue zosyn    2. Acute respiratory failure with hypercapnia mainly :  She was requiring supplemental oxygen at 2 liters and is on room air at home   Mental status much better back to normal   3.  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 On  Acyclovir. improving . 4. Acute on chronic systolic and diastolic heart failure:  Pro-BNP greater than 2000, Last Echo on 12/21/19 revealed an EF of 29-18%, stage I diastolic dysfunction. Chest xray with pulmonary vascular congestion. HFpEF on echo done this admission   5. Bacteremia:  Blood cultures positive for Klebsiella pneumoniae and Staphylococcus coagulase-negative. ID following. Continue cefepime. And vanc    6. Acute on chronic kidney disease in a patient with a history of kidney transplan:  Kidney transplant in 2015. Creatinine was 1.7 on admission, trended up to 2.3 and down to 1.0  Baseline  . Nephrology following. Lisinopril on hold. 7. Bilateral lower extremity edema:  D dimer was 836. Ultrasound of the lower extremities negative for DVT. VQ scan showed low probability for PE. On heparin for dvt prophylaxis   8. Depression: Continue home medications. 9. Elevated troponin level:  Troponin levels: 52->88->56. Cardiology was consulted and likely acute myocardial injury secondary to sepsis, bacteremia and acute renal failure. Outpatient ischemic evaluation. , echo good EF  10. Hyperparathyroidism and parathyroidectomy 2006:  TSH 0.376 and free T4 1.08.   11. Hyperkalemia:  Potassium is better ,  12. Video swallow eval ,  Recommended nectar thick liquid , will start diet with nectar thick  , low appetite , add ensure , and will repeat swallow eval in few days since her mental status is much better    13.  Altered mental status due to sepsis and medications and resp failure hypercapnia , all sedating medications were d/c , improved significantly   14. Malnutrition , and  Decrease oral intake due to sepsis , mental status much better , encourage oral intake , and add protein supplement .    Will probably need rehab placement ,      Electronically signed by Adamaris Daly MD on 11/26/2021 at 10:34 AM

## 2021-11-26 NOTE — PROGRESS NOTES
Physical Therapy Treatment Note/Plan of Care    Room #:  9428/2328-18  Patient Name: Anika Cunningham  YOB: 1955  MRN: [id number]    Date of Service: 11/26/2021     Tentative placement recommendation: Subacute rehab  Equipment recommendation: To be determined      Evaluating Physical Therapist: Dayna Tavarez #[id number]      Specific Provider Orders/Date/Referring Provider :   11/17/21 1200   PT evaluation and treat Start: 11/17/21 1200, End: 11/17/21 1200, ONE TIME, Standing Count: 1 Occurrences, R    Hoa Hernandez, APRN - CNP    Admitting Diagnosis:   Sepsis due to urinary tract infection ([facility] [A41.9, N39.0]  Urinary tract infection without hematuria, site unspecified [N39.0]  Sepsis, due to unspecified organism, unspecified whether acute organ dysfunction present [facility]) [A41.9]      Surgery: none  Visit Diagnoses       Codes    Sepsis, due to unspecified organism, unspecified whether acute organ dysfunction present [facility])    -  Primary A41.9    Urinary tract infection without hematuria, site unspecified     N39.0          Patient Active Problem List   Diagnosis    Peripheral vascular disease ([facility]    Depression    Clotting disorder ([address]    Abnormal EKG    Cancer ([address]    Over weight    S/p cadaver renal transplant    ESRD (end stage renal disease) ([address]    Non morbid obesity due to excess calories    Hypertension    Leg swelling    Lymphedema of both lower extremities    Acute gout involving toe of right foot    Sepsis secondary to UTI ([address]    Acute kidney injury superimposed on CKD ([address]    Thyroid disease    Acute on chronic combined systolic (congestive) and diastolic (congestive) heart failure ([address]        ASSESSMENT of Current Deficits Patient exhibits decreased strength, balance and endurance impairing functional mobility, transfers and gait . Eyes closed throughout session, would only answer with one word answers.  Patient lethargic throughout session; patient unsafe for edge of bed and transfers d/t to pt overall debility, decreased activity tolerance, balance deficits, safety and fall risk. The patient will benefit from continued skilled therapy to increase strength and improve balance for safe functional mobility, to decrease risk of falls, and to meet goals at discharge. PHYSICAL THERAPY  PLAN OF CARE       Physical therapy plan of care is established based on physician order,  patient diagnosis and clinical assessment    Current Treatment Recommendations:    -Bed Mobility: Lower extremity exercises   -Sitting Balance: Incorporate reaching activities to activate trunk muscles   -Standing Balance: Perform strengthening exercises in standing to promote motor control with or without upper extremity support   -Transfers: Provide instruction on proper hand and foot position for adequate transfer of weight onto lower extremities and use of gait device if needed and Cues for hand placement, technique and safety. Provide stabilization to prevent fall   -Gait: Gait training and Standing activities to improve: base of support, weight shift, weight bearing      PT long term treatment goals are located in below grid    Patient and or family understand(s) diagnosis, prognosis, and plan of care. Frequency of treatments: Patient will be seen  daily.          Prior Level of Function: Patient ambulated independently   Rehab Potential: good  for baseline    Past medical history:   Past Medical History:   Diagnosis Date    Abnormal EKG     left bundle branch block    Acute gout involving toe of right foot 9/3/2020    Acute gout involving toe of right foot 9/3/2020    Acute on chronic combined systolic (congestive) and diastolic (congestive) heart failure (HCC)     Cancer (HCC)     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    Cerebrovascular disease     Clotting disorder ([facility] 1985    thrombophlebitis after c section delivery    Depression     15 years followed by dr Giovana Yanez Hyperlipidemia     Hypertension     Hyperthyroidism     Leg swelling 9/3/2020    Lymphedema of both lower extremities 9/3/2020    Peripheral vascular disease ([address]     Renal failure 11/2012    renal transplant    Thrombophlebitis     after c section delivery    Thyroid disease      Past Surgical History:   Procedure Laterality Date   [address]    one normal delivery    COLONOSCOPY      DIAGNOSTIC CARDIAC CATH LAB PROCEDURE  2006    normal coronaries and 1996 normal coronaries    DIALYSIS FISTULA CREATION  march and july 2012    phase one and two patient will start dialysis when needed   [address]    transfemoral venous bypass grafts    KIDNEY TRANSPLANT  2016   [facility]  2015   [facility]  2015    PARATHYROIDECTOMY  2006    left parathyroid  implant and parathyroidectomy       SUBJECTIVE:    Precautions: Up with assistance, falls , shaky movement all extremities, desat quickly    Social history: Patient lives alone in a apartment 9th, elevator access  with No steps  to enter  Tub shower grab bars    Equipment owned: [address] [facility] [place]: [employer] chair,      [address]   How much difficulty turning over in bed?: A Little  How much difficulty sitting down on / standing up from a chair with arms?: A Lot  How much difficulty moving from lying on back to sitting on side of bed?: A Lot  How much help from another person moving to and from a bed to a chair?: A Lot  How much help from another person needed to walk in hospital room?: A Lot  How much help from another person for climbing 3-5 steps with a railing?: A Lot  AM-PAC Inpatient Mobility Raw Score : 13  AM-PAC Inpatient T-Scale Score : 36.74  Mobility Inpatient CMS 0-100% Score: 64.91  Mobility Inpatient CMS G-Code Modifier : CL    Nursing cleared patient for PT treatment.      OBJECTIVE;   Initial Evaluation  Date: 11/18/2021 Treatment Date:  11/26/2021     Short Term/ Long Term   Goals   Was pt agreeable to Eval/treatment? Yes Yes  To be met in 4 days   Pain level   0/10  Very fatigued  0/10    Bed Mobility    Rolling: Moderate assist of 1    Supine to sit: Moderate assist of 1    Sit to supine: Moderate assist of 1    Scooting: Moderate assist of 1   Rolling: Minimal assist of 1   Supine to sit: Moderate assist of 1   Sit to supine: Moderate assist of 1   Scooting: Moderate assist of 1    Rolling: Independent    Supine to sit: Independent    Sit to supine: Independent    Scooting: Independent     Transfers Sit to stand: Not assessed  d/t to pt overall debility, decreased activity tolerance, balance deficits, safety and fall risk. Sit to stand: Moderate assist of 1       Sit to stand:  Moderate assist of 1    Ambulation    not assessed  5 side steps using  hand held assist with Moderate assist of 1       5 feet using  wheeled walker with Moderate assist of 1          ROM Within functional limits    Increase range of motion 10% of affected joints    Strength BUE:  refer to OT eval  RLE:  4-/5  LLE:  4-/5  Increase strength in affected mm groups by 1/3 grade   Balance Sitting EOB:  poor   Dynamic Standing:  not assessed  Sitting EOB: good   Dynamic Standing: poor    Sitting EOB:  fair   Dynamic Standing: fair      Patient is Alert & Oriented x person, place, time and situation and follows directions    Sensation:  Patient  denies numbness/tingling   Edema:  no   Endurance: fair  -    Vitals: Bipap   Blood Pressure at rest  Blood Pressure during session    Heart Rate at rest  Heart Rate during session    SPO2 at rest %  SPO2 during session      Patient education  Patient educated on role of Physical Therapy, risks of immobility, safety and plan of care,  importance of mobility while in hospital , purse lip breathing, ankle pumps, quad set and glut set for edema control, blood clot prevention and safety      Patient response to education:   Pt verbalized understanding Pt demonstrated skill Pt requires further education in this area   Yes Partial Yes      Treatment:  Patient practiced and was instructed/facilitated in the following treatment: Worked with pt rolling . Performed supine exercise. Therapeutic Exercises:  ankle pumps, quad sets, heel slide, hip abduction/adduction and straight leg raise  X 20 reps each       At end of session, patient in bed with alarm and nursing aid present call light and phone within reach,  all lines and tubes intact, nursing notified. Patient would benefit from continued skilled Physical Therapy to improve functional independence and quality of life.          Patient's/ family goals   rehab    Time in 1000  Time out  1035    Total Treatment Time  35 minutes    CPT codes:    Therapeutic activities (95698)   20 minutes  1 unit(s)  Therapeutic exercises (52643)   15 minutes  1 unit(s)    Jey Holder PT DNS#[id number]

## 2021-11-26 NOTE — PLAN OF CARE
Problem: Pain:  Goal: Pain level will decrease  Description: Pain level will decrease  Outcome: Met This Shift  Goal: Control of acute pain  Description: Control of acute pain  Outcome: Met This Shift  Goal: Control of chronic pain  Description: Control of chronic pain  Outcome: Met This Shift     Problem: Falls - Risk of:  Goal: Will remain free from falls  Description: Will remain free from falls  Outcome: Met This Shift  Goal: Absence of physical injury  Description: Absence of physical injury  Outcome: Met This Shift     Problem: Skin Integrity:  Goal: Will show no infection signs and symptoms  Description: Will show no infection signs and symptoms  Outcome: Met This Shift  Goal: Absence of new skin breakdown  Description: Absence of new skin breakdown  Outcome: Met This Shift     Problem: Coping:  Goal: Ability to remain calm will improve  Description: Ability to remain calm will improve  Outcome: Met This Shift     Problem: Self-Care:  Goal: Ability to participate in self-care as condition permits will improve  Description: Ability to participate in self-care as condition permits will improve  Outcome: Met This Shift

## 2021-11-26 NOTE — PROGRESS NOTES
OT BEDSIDE TREATMENT NOTE      Date:2021  Patient Name: Cynthia Amato  MRN: [id number]  : 1955  Room: [address] [place] [address]          Evaluating OT: Karl Yeung OTR/GERMAN; [id number]        Referring Provider and Orders/Date:     OT eval and treat  Start:  21 1200,   End:  21 1200,   ONE TIME,   Standing Count:  1 Occurrences,   R         Geraldine Veras, APRN - CNP               Diagnosis:   1. Sepsis, due to unspecified organism, unspecified whether acute organ dysfunction present ([facility] [address]    2.  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 [address] 1985     thrombophlebitis after c section delivery    Depression       15 years followed by dr Giovana Yanez Hyperlipidemia      Hypertension      Hyperthyroidism      Leg swelling 9/3/2020    Lymphedema of both lower extremities 9/3/2020    Peripheral vascular disease ([facility] [address] [facility]      Renal failure 2012     renal transplant    Thrombophlebitis       after c section delivery    Thyroid disease               Past Surgical History             Past Surgical History:   Procedure Laterality Date     SECTION   1985     one normal delivery    COLONOSCOPY        DIAGNOSTIC CARDIAC CATH LAB PROCEDURE        normal coronaries and  normal coronaries    DIALYSIS FISTULA CREATION   march and 2012     phase one and two patient will start dialysis when needed    FEMORAL BYPASS        transfemoral venous bypass grafts    KIDNEY TRANSPLANT   2016    KIDNEY TRANSPLANT   2015    KIDNEY TRANSPLANT   2015    PARATHYROIDECTOMY   2006     left parathyroid  implant and parathyroidectomy           Precautions:  Fall Risk, 2L    Recommended placement: subacute    Assessment of current deficits     [x]? ? Functional mobility           [x]? ?ADLs           [x]? ? Strength                   [x]? ? Cognition     [x]? ? Functional transfers         [x]? ? IADLs         [x]? ? Safety Awareness   [x]? ?Endurance     []? ? Fine Coordination                        [x]? ? Balance      []? ? Vision/perception    []? ? Sensation       [x]? ? Gross Motor Coordination            []? ? ROM           []? ? Delirium                   []? ? Motor Control      OT PLAN OF CARE   OT POC based on physician orders, patient diagnosis and results of clinical assessment     Frequency/Duration 1-3 days/wk for 2 weeks PRN   Specific OT Treatment Interventions to include:   * Instruction/training on adapted ADL techniques and AE recommendations to increase functional independence within precautions       * Training on energy conservation strategies, correct breathing pattern and techniques to improve independence/tolerance for self-care routine  * Functional transfer/mobility training/DME recommendations for increased independence, safety, and fall prevention  * Patient/Family education to increase follow through with safety techniques and functional independence  * Recommendation of environmental modifications for increased safety with functional transfers/mobility and ADLs  * Cognitive retraining/development of therapeutic activities to improve problem solving, judgement, memory, and attention for increased safety/participation in ADL/IADL tasks  * Therapeutic exercise to improve motor endurance, ROM, and functional strength for ADLs/functional transfers  * Therapeutic activities to facilitate/challenge dynamic balance, stand tolerance for increased safety and independence with ADLs  * Therapeutic activities to facilitate gross/fine motor skills for increased independence with ADLs  * Neuro-muscular re-education: facilitation of righting/equilibrium reactions, midline orientation, scapular stability/mobility, normalization of muscle tone, and facilitation of volitional active controled movement  * Positioning to improve skin integrity, interaction with environment and functional independence      Recommended Adaptive Equipment/DME:  TBD       Home Living: Pt lives alone in a apartment 9th, elevator access  with No steps  to enter. Laundry on the 2nd floor with elevator access. Pt cares for mother as well that lives in her own home, but there is a lift to enter. Reports that she has family and friends to assist if needed.     Bathroom setup: tub shower with grab bars, shower chair, standard toilet    DME owned: rollator      Prior Level of Function: indep with ADLs , indep with IADLs; ambulated indep    Driving: yes   Occupation: retired   Enjoys: [employer], casino with mother     Pain Level: pt c/o pain when weight bearing through  [employer]; nsg aware; pt did not quantify pain  Cognition: A&O: 4/4; follows 3-4 step directions              Memory:  Fair              Sequencing:  Fair              Problem solving:  fair              Judgement/safety:  fair     AM-PAC Daily Activity Inpatient   How much help for putting on and taking off regular lower body clothing?: Total  How much help for Bathing?: A Lot  How much help for Toileting?: A Lot  How much help for putting on and taking off regular upper body clothing?: A Lot  How much help for taking care of personal grooming?: A Lot  How much help for eating meals?: A Little  AM-PAC Inpatient Daily Activity Raw Score: 12                Functional Assessment:      Initial Eval Status  Date: 11/21/2021   Treatment Status  Date: 11/26/ 2021 STGs = LTGs  Time frame: 10-14 days   Feeding Minimal Assist physically to bring drinks to mouth to prevent spillage. Supervision; pt able to bring cup to mouth without assist Set up   Grooming Min Assist with oral care, face/hand wash and dep assist with hair wash and comb due to fatigue.  N/T Stand by Assist    UB Dressing Dependent with gown management from supine Mod A to tie/adjust back of gown when pt seated EOB Moderate Assist    LB Dressing Dependent to don dinah socks from supine Mod A to adjust socks when seated EOB Maximal Assist    Bathing Maximal Assist suspected with pt declining to complete on eval due to fatigue. Pt simulated ability to wash arms and chest.  N/T  Minimal Assist    Toileting Dependent with hassan in place and need for bed pan Mod A for hygiene ; pt able to complete pericare when standing, however required assist to clean buttocks when standing ; min A for standing balance; use of FWW Maximal Assist    Bed Mobility  Supine to sit: Maximal Assist   Sit to supine: Maximal Assist  And moderate assist for rolling. Limited with confusion and fatigue at time of eval. Pt reported going for a test prior to therapist arrival that \"wiped me out\"  Mod A for supine to sit; mod A for scooting; sit to supine at mod A; assist to guide UB to sitting and LE's to supine; max A with hercules pad to scoot to HOB; HOB elevated at end of session; alarm on Supine to sit: Minimal Assist   Sit to supine: Minimal Assist    Functional Transfers Moderate Assist with sit to stand with walker to attempt side stepping. Not safe at this time and pt sat with  Hips higher in bed instead. Sit to stand transfers to/from EOB x 4-5 reps with mod A with FWW; cuing for hand placement, upright position Minimal Assist    Functional Mobility  NT due to pt overall debility, decreased activity tolerance, balance deficits, safety and fall risk.      3-4 ft x 3 reps to side step to/from HOB/foot of bed and to [facility] again; min/mod  A with assist for walker navigation, cuing for sequencing and upright position.  Pt required seated rest breaks between steps; pt's O2 sats dropped to ~74% with pt requiring cuing for deep breathing, however pt's O2 sats returned to 90's in ~2 minutes; pulse ox reading may not have been accurate, as slightly loose on fingertip  Moderate Assist    Balance Sitting:     Static:  Fair-    Dynamic:fair-  Standing: poor+ Sitting:     Static:  Fair-    Dynamic:fair-/poor  Standing: N/T Sitting:     Static:  fair    Dynamic:fair  Standing: fair-   Activity Tolerance Vitals with activity: 2 L with 93-95% throughout the eval/ADLs  Sitting tolerance EOB limited to <5min; standing <20secs Fair minus/poor  Increase standing tolerance for >2min with stable vital signs for carry over into toileting, functional tranfers and indep in ADLs   Visual/  Perceptual Glasses: not Present; [facility]     Reports change in vision since admission: No      NA   Yair UE Strengthening  4-/5 generally   4+/5MMT generally for carry over into self care, functional transfers and functional mobility with AD.       Hand Dominance  [x]? ? Right   []? ? Left     AROM (PROM) Strength Additional Info:    RUE  WFL 4-/5 good  and fair FMC/dexterity noted during ADL tasks  Opposition [x]? ? Intact []? ? Impaired  Finger to nose [x]? ? Intact []? ? Impaired      LUE WFL 4-/5 good  and FMC/dexterity noted during ADL tasks  Opposition [x]? ? Intact []? ? Impaired  Finger to nose [x]? ? Intact []? ? Impaired      Hearing: WFL   Sensation:  No c/o numbness or tingling   Tone: WFL   Edema: B UE's, B LE's; nsg aware     Comments: Patient cleared by nursing staff. Upon arrival pt supine in bed with HOB slightly elevated. Pt agreeable to OT tx session. Son present, however stepped OOR during tx session. Pt educated with regards to bed mobility, hand placement, safety awareness, static sitting balance,  standing balance, transfer training, functional mobility, ADL retraining,  LE/UE dressing, toileting/hygiene, ECT's.   At end of session pt seated in bed with HOB elevated with all lines and tubes intact, call light within reach. Overall, pt demonstrated decreased independence and safety during completion of ADL/functional transfers/mobility tasks. Pt would benefit from continued skilled OT to increase safety and independence with completion of ADL/IADL tasks for functional independence and quality of life. Pt demo'd increased tolerance and alertness since last session. Pt required cues and education as noted above for safe facilitation and completion of tasks. Therapist provided skilled monitoring of patient's response during treatment session. Prior to and at the end of session, environmental modifications/IV line management completed for patients safety and efficiency of treatment session. Overall, patient demonstrates moderate difficulties with completion of BADLs and IADLs. Factors contributing to these difficulties include difficulty with bed mobility, decreased endurance, and generalized weakness. As noted above, patient likely to benefit from further OT intervention to increase independence, safety, and overall quality of life. Treatment:     ? Bed mobility: Facilitated bed mobility with cues for proper body mechanics and sequencing to prepare for ADL completion. ? Functional transfers: Facilitated transfers to/from EOB x 4-5 reps with cues for body alignment, safety and hand placement, walker safety. ? ADL completion: Self-care retraining for the above-mentioned ADLs; training on proper hand placement, safety technique, sequencing, and energy conservation techniques. ? Postural Balance: Sitting/standing balance retraining to improve righting reactions with postural changes during ADLs. ? Skilled positioning: Proper positioning to improve interaction with environment, overall functioning and decrease/prevent edema and contractures.     · Pt has made fair Berl Sear + progress towards set goals    · OT 1-3x/week for 5-7 days during hospitalization       Treatment Time also includes thorough review of current medical information, gathering information on past medical history/social history and prior level of function, informal observation of tasks, assessment of data and education on plan of care and goals.     Treatment Time In: 3:11 PM     Treatment Time Out: 3:36 PM            Treatment Charges: Mins Units   ADL/Home Mgt     11431 8 1   Thera Activities     76146 17 1   Ther Ex                 [id number]     Manual Therapy    41806     Neuro Re-ed         39075     Orthotic manage/training                               [id number]     Non Billable Time     Total Timed Treatment 25 [address] [facility], YANCEY/L #[id number]

## 2021-11-27 VITALS
TEMPERATURE: 97.7 F | RESPIRATION RATE: 20 BRPM | WEIGHT: 227.2 LBS | OXYGEN SATURATION: 98 % | BODY MASS INDEX: 41.81 KG/M2 | HEART RATE: 68 BPM | HEIGHT: 62 IN | DIASTOLIC BLOOD PRESSURE: 88 MMHG | SYSTOLIC BLOOD PRESSURE: 157 MMHG

## 2021-11-27 LAB
ALBUMIN SERPL-MCNC: 3.1 G/DL (ref 3.5–5.2)
ALP BLD-CCNC: 56 U/L (ref 35–104)
ALT SERPL-CCNC: 18 U/L (ref 0–32)
ANION GAP SERPL CALCULATED.3IONS-SCNC: 8 MMOL/L (ref 7–16)
AST SERPL-CCNC: 18 U/L (ref 0–31)
BASOPHILS ABSOLUTE: 0.04 E9/L (ref 0–0.2)
BASOPHILS RELATIVE PERCENT: 0.6 % (ref 0–2)
BILIRUB SERPL-MCNC: 0.7 MG/DL (ref 0–1.2)
BUN BLDV-MCNC: 15 MG/DL (ref 6–23)
CALCIUM SERPL-MCNC: 9.9 MG/DL (ref 8.6–10.2)
CHLORIDE BLD-SCNC: 104 MMOL/L (ref 98–107)
CO2: 29 MMOL/L (ref 22–29)
CREAT SERPL-MCNC: 1.1 MG/DL (ref 0.5–1)
EOSINOPHILS ABSOLUTE: 0.13 E9/L (ref 0.05–0.5)
EOSINOPHILS RELATIVE PERCENT: 1.9 % (ref 0–6)
GFR AFRICAN AMERICAN: >60
GFR NON-AFRICAN AMERICAN: 50 ML/MIN/1.73
GLUCOSE BLD-MCNC: 90 MG/DL (ref 74–99)
HCT VFR BLD CALC: 36.1 % (ref 34–48)
HEMOGLOBIN: 10.5 G/DL (ref 11.5–15.5)
IMMATURE GRANULOCYTES #: 0.11 E9/L
IMMATURE GRANULOCYTES %: 1.6 % (ref 0–5)
LYMPHOCYTES ABSOLUTE: 0.93 E9/L (ref 1.5–4)
LYMPHOCYTES RELATIVE PERCENT: 13.6 % (ref 20–42)
MCH RBC QN AUTO: 31.3 PG (ref 26–35)
MCHC RBC AUTO-ENTMCNC: 29.1 % (ref 32–34.5)
MCV RBC AUTO: 107.4 FL (ref 80–99.9)
MONOCYTES ABSOLUTE: 0.72 E9/L (ref 0.1–0.95)
MONOCYTES RELATIVE PERCENT: 10.5 % (ref 2–12)
NEUTROPHILS ABSOLUTE: 4.93 E9/L (ref 1.8–7.3)
NEUTROPHILS RELATIVE PERCENT: 71.8 % (ref 43–80)
PDW BLD-RTO: 13.7 FL (ref 11.5–15)
PLATELET # BLD: 139 E9/L (ref 130–450)
PMV BLD AUTO: 13.6 FL (ref 7–12)
POTASSIUM SERPL-SCNC: 3.6 MMOL/L (ref 3.5–5)
RBC # BLD: 3.36 E12/L (ref 3.5–5.5)
SARS-COV-2, NAAT: NOT DETECTED
SODIUM BLD-SCNC: 141 MMOL/L (ref 132–146)
TOTAL PROTEIN: 5.5 G/DL (ref 6.4–8.3)
WBC # BLD: 6.9 E9/L (ref 4.5–11.5)

## 2021-11-27 PROCEDURE — 94640 AIRWAY INHALATION TREATMENT: CPT

## 2021-11-27 PROCEDURE — 6370000000 HC RX 637 (ALT 250 FOR IP): Performed by: NURSE PRACTITIONER

## 2021-11-27 PROCEDURE — 36415 COLL VENOUS BLD VENIPUNCTURE: CPT

## 2021-11-27 PROCEDURE — 85025 COMPLETE CBC W/AUTO DIFF WBC: CPT

## 2021-11-27 PROCEDURE — 2700000000 HC OXYGEN THERAPY PER DAY

## 2021-11-27 PROCEDURE — 80053 COMPREHEN METABOLIC PANEL: CPT

## 2021-11-27 PROCEDURE — 87635 SARS-COV-2 COVID-19 AMP PRB: CPT

## 2021-11-27 PROCEDURE — 6360000002 HC RX W HCPCS: Performed by: NURSE PRACTITIONER

## 2021-11-27 PROCEDURE — 94660 CPAP INITIATION&MGMT: CPT

## 2021-11-27 PROCEDURE — 2580000003 HC RX 258: Performed by: SPECIALIST

## 2021-11-27 PROCEDURE — 2580000003 HC RX 258: Performed by: INTERNAL MEDICINE

## 2021-11-27 PROCEDURE — 6360000002 HC RX W HCPCS: Performed by: INTERNAL MEDICINE

## 2021-11-27 PROCEDURE — 6360000002 HC RX W HCPCS: Performed by: SPECIALIST

## 2021-11-27 RX ORDER — IPRATROPIUM BROMIDE AND ALBUTEROL SULFATE 2.5; .5 MG/3ML; MG/3ML
3 SOLUTION RESPIRATORY (INHALATION) EVERY 6 HOURS PRN
Qty: 360 ML | Refills: 1 | Status: SHIPPED | OUTPATIENT
Start: 2021-11-27 | End: 2022-05-17 | Stop reason: ALTCHOICE

## 2021-11-27 RX ORDER — ONDANSETRON 4 MG/1
4 TABLET, ORALLY DISINTEGRATING ORAL EVERY 8 HOURS PRN
Qty: 30 TABLET | Refills: 0 | Status: SHIPPED | OUTPATIENT
Start: 2021-11-27 | End: 2022-04-21 | Stop reason: DRUGHIGH

## 2021-11-27 RX ORDER — BUDESONIDE 0.5 MG/2ML
500 INHALANT ORAL 2 TIMES DAILY
Qty: 30 EACH | Refills: 0 | Status: SHIPPED | OUTPATIENT
Start: 2021-11-27 | End: 2022-04-21 | Stop reason: ALTCHOICE

## 2021-11-27 RX ADMIN — PIPERACILLIN SODIUM AND TAZOBACTAM SODIUM 3375 MG: 3; .375 INJECTION, POWDER, LYOPHILIZED, FOR SOLUTION INTRAVENOUS at 13:41

## 2021-11-27 RX ADMIN — DESVENLAFAXINE 50 MG: 50 TABLET, EXTENDED RELEASE ORAL at 16:47

## 2021-11-27 RX ADMIN — HEPARIN SODIUM 5000 UNITS: 5000 INJECTION INTRAVENOUS; SUBCUTANEOUS at 13:41

## 2021-11-27 RX ADMIN — ALLOPURINOL 100 MG: 100 TABLET ORAL at 09:27

## 2021-11-27 RX ADMIN — ARFORMOTEROL TARTRATE 15 MCG: 15 SOLUTION RESPIRATORY (INHALATION) at 07:19

## 2021-11-27 RX ADMIN — METOPROLOL SUCCINATE 50 MG: 50 TABLET, EXTENDED RELEASE ORAL at 09:27

## 2021-11-27 RX ADMIN — FOLIC ACID 1 MG: 1 TABLET ORAL at 09:27

## 2021-11-27 RX ADMIN — BUDESONIDE 500 MCG: 0.5 INHALANT RESPIRATORY (INHALATION) at 07:19

## 2021-11-27 RX ADMIN — IPRATROPIUM BROMIDE AND ALBUTEROL SULFATE 1 AMPULE: .5; 2.5 SOLUTION RESPIRATORY (INHALATION) at 07:19

## 2021-11-27 RX ADMIN — Medication 300 UNITS: at 09:28

## 2021-11-27 RX ADMIN — IPRATROPIUM BROMIDE AND ALBUTEROL SULFATE 1 AMPULE: .5; 2.5 SOLUTION RESPIRATORY (INHALATION) at 10:25

## 2021-11-27 RX ADMIN — PREDNISONE 5 MG: 5 TABLET ORAL at 09:27

## 2021-11-27 RX ADMIN — SODIUM CHLORIDE: 9 INJECTION, SOLUTION INTRAVENOUS at 09:00

## 2021-11-27 RX ADMIN — HEPARIN SODIUM 5000 UNITS: 5000 INJECTION INTRAVENOUS; SUBCUTANEOUS at 06:05

## 2021-11-27 RX ADMIN — SODIUM CHLORIDE, PRESERVATIVE FREE 10 ML: 5 INJECTION INTRAVENOUS at 09:26

## 2021-11-27 RX ADMIN — LEVOTHYROXINE SODIUM 50 MCG: 0.05 TABLET ORAL at 06:05

## 2021-11-27 RX ADMIN — CYCLOSPORINE 100 MG: 25 CAPSULE, LIQUID FILLED ORAL at 09:27

## 2021-11-27 RX ADMIN — ATORVASTATIN CALCIUM 10 MG: 10 TABLET, FILM COATED ORAL at 09:27

## 2021-11-27 RX ADMIN — IPRATROPIUM BROMIDE AND ALBUTEROL SULFATE 1 AMPULE: .5; 2.5 SOLUTION RESPIRATORY (INHALATION) at 14:09

## 2021-11-27 RX ADMIN — PIPERACILLIN SODIUM AND TAZOBACTAM SODIUM 3375 MG: 3; .375 INJECTION, POWDER, LYOPHILIZED, FOR SOLUTION INTRAVENOUS at 06:05

## 2021-11-27 ASSESSMENT — PAIN SCALES - GENERAL
PAINLEVEL_OUTOF10: 0

## 2021-11-27 ASSESSMENT — PAIN SCALES - WONG BAKER: WONGBAKER_NUMERICALRESPONSE: 0

## 2021-11-27 NOTE — CARE COORDINATION
SS NOTE: COVID NEGATIVE 11/17, PT WILL NEED A RAPID NEGATIVE COVID TO GO TO [facility]. Arrangements completed for pt to be transferred to [facility] today at [facility] via [employer]. Pt's friend made aware of this plan. For the SNF placement pt will need NO PRECERT needed, pt will need a signed GINA, current PT/OT notes and SW completed the HENs. AG Owens.11/27/2021.2:57PM.

## 2021-11-27 NOTE — PLAN OF CARE
Problem: Pain:  Goal: Pain level will decrease  Description: Pain level will decrease  11/27/2021 0448 by Janice Espinoza RN  Outcome: Met This Shift  11/26/2021 1752 by Royal Dominguez RN  Outcome: Met This Shift  Goal: Control of acute pain  Description: Control of acute pain  11/27/2021 0448 by Janice Espinoza RN  Outcome: Met This Shift  11/26/2021 1752 by Royal Dominguez RN  Outcome: Met This Shift  Goal: Control of chronic pain  Description: Control of chronic pain  11/27/2021 0448 by Janice Espinoza RN  Outcome: Met This Shift  11/26/2021 1752 by Royal Dominguez RN  Outcome: Met This Shift     Problem: Falls - Risk of:  Goal: Will remain free from falls  Description: Will remain free from falls  11/27/2021 0448 by Janice Espinoza RN  Outcome: Met This Shift  11/26/2021 1752 by Royal Dominguez RN  Outcome: Met This Shift  Goal: Absence of physical injury  Description: Absence of physical injury  11/27/2021 0448 by Janice Espinoza RN  Outcome: Met This Shift  11/26/2021 1752 by Royal Dominguez RN  Outcome: Met This Shift     Problem: Skin Integrity:  Goal: Will show no infection signs and symptoms  Description: Will show no infection signs and symptoms  11/27/2021 0448 by Janice Espinoza RN  Outcome: Met This Shift  11/26/2021 1752 by Royal Dominguez RN  Outcome: Met This Shift  Goal: Absence of new skin breakdown  Description: Absence of new skin breakdown  11/27/2021 0448 by Janice Espinoza RN  Outcome: Met This Shift  11/26/2021 1752 by Royal Dominguez RN  Outcome: Met This Shift     Problem: Coping:  Goal: Ability to remain calm will improve  Description: Ability to remain calm will improve  11/27/2021 0448 by Janice Espinoza RN  Outcome: Met This Shift  11/26/2021 1752 by Royal Dominguez RN  Outcome: Met This Shift     Problem: Self-Care:  Goal: Ability to participate in self-care as condition permits will improve  Description: Ability to participate in self-care as condition permits will improve  11/27/2021 0448 by Janice Espinoza RN  Outcome: Met This Shift  11/26/2021 1752 by Nyla Patel, RN  Outcome: Met This Shift

## 2021-11-27 NOTE — PROGRESS NOTES
Nurse to nurse report given to Tomas at [facility]. Patient is set to be picked up at 1900. All of patient's items have been packed up. All documentation has been completed and printed.

## 2021-11-27 NOTE — PROGRESS NOTES
[facility]     [address] [place] [address]  [place], [address] [place] [address] Street  Phone [phone]   [phone]      Admit Date: 2021  4:50 AM  Pt Name: Yoseph Rangel  MRN: [id number]  : 1955  Reason for Consult:    Chief Complaint   Patient presents with    Urinary Tract Infection     Requesting Physician:  Homero Jama MD  PCP: Homero Jama MD  History Obtained From:  patient, chart   ID consulted for Sepsis due to urinary tract infection ([address] [A41.9, N39.0]  Urinary tract infection without hematuria, site unspecified [N39.0]  Sepsis, due to unspecified organism, unspecified whether acute organ dysfunction present ([address] [A41.9]  on hospital day 10   Face to face encounter   CHIEF COMPLAINT       Chief Complaint   Patient presents with    Urinary Tract Infection     HISTORYOF PRESENT ILLNESS   Yoseph Rangel is a 73 y.o. female who presents with   has a past medical history of Abnormal EKG, Acute gout involving toe of right foot, Acute gout involving toe of right foot, Acute on chronic combined systolic (congestive) and diastolic (congestive) heart failure ([address] Cancer ([address] Cerebrovascular disease, Clotting disorder ([address] Depression, Hyperlipidemia, Hypertension, Hyperthyroidism, Leg swelling, Lymphedema of both lower extremities, Peripheral vascular disease ([address] Renal failure, Thrombophlebitis, and Thyroid disease. Urinary Tract Infection        Patient admitted to hospital from home. She was weak, reports dysuria, and generalized weakness. She has been having fever and chills at home. She was hypoxic at home- 88 %. She has a cough. She was admitted for further evaluation. She is known to ID service- she was last seen in 2019-for a klebsiella UTI- was discharge with cefepime due to tachycardia and prolonged QT with cipro.      She is currently on IV cefepime  Her temp has been as high as 101.7   She has a cough - and is tachypnic     ID has been asked to evaluate and manage atbs    Current visit  Rocco Leos  11/27/21   PT IN BED more awake and alert has no c/o f/c/n/v/d  [facility] [address]:   AS IN HPI   CURRENT MEDICATIONS     Current Facility-Administered Medications:     sodium chloride flush 0.9 % injection 5-40 mL, 5-40 mL, IntraVENous, 2 times per day, Aaliyah Mayfield MD, 10 mL at 11/27/21 0926    sodium chloride flush 0.9 % injection 5-40 mL, 5-40 mL, IntraVENous, PRN, Dayanara Ramirez MD    0.9 % sodium chloride infusion, 25 mL, IntraVENous, PRN, Dayanara Ramirez MD    heparin flush 100 UNIT/ML injection 300 Units, 3 mL, IntraVENous, 2 times per day, Aaliyah Mayfield MD, 300 Units at 11/27/21 0928    heparin flush 100 UNIT/ML injection 300 Units, 3 mL, IntraCATHeter, PRN, Dayanara Ramirez MD    piperacillin-tazobactam (ZOSYN) 3,375 mg in dextrose 5 % 50 mL IVPB extended infusion (mini-bag), 3,375 mg, IntraVENous, Q8H, Andreea Ring MD, Last Rate: 12.5 mL/hr at 11/27/21 1341, 3,375 mg at 11/27/21 1341    0.9 % sodium chloride infusion, , IntraVENous, Q8H, Andreea Ring MD, Stopped at 11/27/21 1100    Arformoterol Tartrate (BROVANA) nebulizer solution 15 mcg, 15 mcg, Nebulization, BID, KATINA Edwards CNP, 15 mcg at 11/27/21 0719    budesonide (PULMICORT) nebulizer suspension 500 mcg, 500 mcg, Nebulization, BID, KATINA Edwards CNP, 500 mcg at 11/27/21 0719    0.9 % sodium chloride infusion, , IntraVENous, Continuous, Rhonda Zhao MD, Last Rate: 35 mL/hr at 11/24/21 2213, New Bag at 11/24/21 2213    allopurinol (ZYLOPRIM) tablet 100 mg, 100 mg, Oral, Daily, KATINA Edwards - CNP, 100 mg at 11/27/21 8489    [Held by provider] cloZAPine (CLOZARIL) tablet 50 mg, 50 mg, Oral, Nightly, KATINA Edwards - CNP, 50 mg at 11/19/21 7032    cycloSPORINE modified (NEORAL) capsule 100 mg, 100 mg, Oral, BID, KATINA Edwards - CNP, 100 mg at 11/27/21 4185   desvenlafaxine succinate (PRISTIQ) extended release tablet 50 mg, 50 mg, Oral, QPM, Westly Simmering, APRN - CNP, 50 mg at 11/26/21 1746    docusate sodium (COLACE) capsule 100 mg, 100 mg, Oral, BID, Westly Simmering, APRN - CNP, 100 mg at 33/17/13 2348    folic acid (FOLVITE) tablet 1 mg, 1 mg, Oral, Daily, Westly Simmering, APRN - CNP, 1 mg at 11/27/21 7848    levothyroxine (SYNTHROID) tablet 50 mcg, 50 mcg, Oral, Daily, Westly Simmering, APRN - CNP, 50 mcg at 11/27/21 0605    [Held by provider] lisinopril (PRINIVIL;ZESTRIL) tablet 5 mg, 5 mg, Oral, Daily, Westly Simmering, APRN - CNP, 5 mg at 11/17/21 1430    metoprolol succinate (TOPROL XL) extended release tablet 50 mg, 50 mg, Oral, Daily, Westly Simmering, APRN - CNP, 50 mg at 11/27/21 3501    predniSONE (DELTASONE) tablet 5 mg, 5 mg, Oral, Daily, Westly Simmering, APRN - CNP, 5 mg at 11/27/21 6239    atorvastatin (LIPITOR) tablet 10 mg, 10 mg, Oral, Daily, Westly Simmering, APRN - CNP, 10 mg at 11/27/21 0927    ondansetron (ZOFRAN-ODT) disintegrating tablet 4 mg, 4 mg, Oral, Q8H PRN **OR** ondansetron (ZOFRAN) injection 4 mg, 4 mg, IntraVENous, Q6H PRN, Westly Simmering, APRN - CNP    acetaminophen (TYLENOL) tablet 650 mg, 650 mg, Oral, Q6H PRN, 650 mg at 11/17/21 2005 **OR** acetaminophen (TYLENOL) suppository 650 mg, 650 mg, Rectal, Q6H PRN, Westly Simmering, APRN - CNP    polyethylene glycol (GLYCOLAX) packet 17 g, 17 g, Oral, Daily PRN, Westly Simmering, APRN - CNP    heparin (porcine) injection 5,000 Units, 5,000 Units, SubCUTAneous, 3 times per day, KATINA Gonzalez CNP, 5,000 Units at 11/27/21 1341    ipratropium-albuterol (DUONEB) nebulizer solution 1 ampule, 1 ampule, Inhalation, Q4H WA, KATINA Gonzalez CNP, 1 ampule at 11/27/21 1409  ALLERGIES     Macrodantin [nitrofurantoin macrocrystal] and Sulfa antibiotics  Immunization History   Administered Date(s) Administered    COVID-19, Pfizer, PF, 30mcg/0.3mL 01/28/2021, 02/18/2021, 08/18/2021      Internal Administration   First Dose COVID-19, JOHN Rubi, 30mcg/0.3mL  01/28/2021   Second Dose COVID-19, JOHN Rubi, 30mcg/0.3mL   02/18/2021       Last COVID Lab SARS-CoV-2 (no units)   Date Value   01/20/2021 Not Detected     SARS-CoV-2, PCR (no units)   Date Value   11/17/2021 Not Detected     SARS-CoV-2, NAAT (no units)   Date Value   11/17/2021 Not Detected        PAST MEDICAL HISTORY     Past Medical History:   Diagnosis Date    Abnormal EKG     left bundle branch block    Acute gout involving toe of right foot 9/3/2020    Acute gout involving toe of right foot 9/3/2020    Acute on chronic combined systolic (congestive) and diastolic (congestive) heart failure (HCC)     Cancer (HCC)     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    Cerebrovascular disease     Clotting disorder ([address] 1985    thrombophlebitis after c section delivery    Depression     15 years followed by dr Jojo Cruz    Hyperlipidemia     Hypertension     Hyperthyroidism     Leg swelling 9/3/2020    Lymphedema of both lower extremities 9/3/2020    Peripheral vascular disease ([address]     Renal failure 11/2012    renal transplant    Thrombophlebitis     after c section delivery    Thyroid disease      SURGICAL HISTORY       Past Surgical History:   Procedure Laterality Date   [address]    one normal delivery    COLONOSCOPY      DIAGNOSTIC CARDIAC CATH LAB PROCEDURE  2006    normal coronaries and 1996 normal coronaries    DIALYSIS FISTULA CREATION  march and july 2012    phase one and two patient will start dialysis when needed   [address]    transfemoral venous bypass grafts    KIDNEY TRANSPLANT  2016    KIDNEY TRANSPLANT  2015   [facility]  2015    PARATHYROIDECTOMY  2006    left parathyroid  implant and parathyroidectomy     PHYSICAL EXAM          Vitals:    11/26/21 1731 11/26/21 1755 11/27/21 0841 11/27/21 0932   BP: (!) 164/85  (!) 155/68    Pulse: 76  70 Resp: 16  26    Temp: 97.7 °F (36.5 °C)  97.1 °F (36.2 °C)    TempSrc: Oral  Temporal    SpO2: 96% 97% 96% 92%   Weight:       Height:         Physical Exam   Constitutional/General:  3L in bed awake nad  Head: NC/AT  Eyes: PERRL, EOMI  Pulmonary: Lungs DEC BS ANT   No wheeze ono2   Cardiovascular:  S1/S2   Abdomen: Soft, + BS.    distension. Nontender. Extremities: Moves all extremities x 4.   + edema   Skin: Warm and dry  NO RASH   Neurologic:    No focal deficits follow commands  PIcc  Chin yellow      DIAGNOSTIC RESULTS   RADIOLOGY:   XR CHEST (2 VW)    Result Date: 11/17/2021  EXAMINATION: TWO XRAY VIEWS OF THE CHEST 11/17/2021 9:31 am COMPARISON: None. HISTORY: ORDERING SYSTEM PROVIDED HISTORY: elevated BNP, wheezing TECHNOLOGIST PROVIDED HISTORY: Reason for exam:->elevated BNP, wheezing FINDINGS: There is prominence of the pulmonary vasculature. There is no effusion or pneumothorax. The cardiomediastinal silhouette is stable in size. The osseous structures are without acute process. Mild pulmonary vascular congestion. XR CHEST 1 VIEW    Result Date: 11/17/2021  EXAMINATION: ONE XRAY VIEW OF THE CHEST 11/17/2021 5:17 am COMPARISON: 2nd June 2020 HISTORY: ORDERING SYSTEM PROVIDED HISTORY: sepsis TECHNOLOGIST PROVIDED HISTORY: Reason for exam:->sepsis FINDINGS: Heart is mildly enlarged. Pulmonary vascularity is normal.  Lungs are clear. Neither costophrenic angle is blunted. Mild cardiomegaly.      LABS  Recent Labs     11/27/21  0640   WBC 6.9   HGB 10.5*   HCT 36.1   .4*        Recent Labs     11/25/21  0546 11/25/21  0546 11/26/21  0540 11/26/21  0540 11/27/21  0640     --  143  --  141   K 3.9   < > 3.8   < > 3.6      < > 104   < > 104   CO2 27   < > 28   < > 29   BUN 20  --  19  --  15   CREATININE 1.0  --  1.2*  --  1.1*   GFRAA >60  --  54  --  >60   LABGLOM 56  --  45  --  50   GLUCOSE 94  --  91  --  90   PROT 5.7*  --  5.6*  --  5.5*   LABALBU 3.1*  -- 3.1*  --  3.1*   CALCIUM 10.1  --  10.0  --  9.9   BILITOT 1.0  --  0.8  --  0.7   ALKPHOS 56  --  57  --  56   AST 14  --  19  --  18   ALT 15  --  18  --  18    < > = values in this interval not displayed. Lab Results   Component Value Date    COVID19 Not Detected 11/17/2021     COVID-19/MARINA-COV2 LABS  Recent Labs     11/25/21  0546 11/26/21  0540 11/27/21  0640   AST 14 19 18   ALT 15 18 18     MICROBIOLOGY:     Cultures :   Lab Results   Component Value Date    BC 5 Days no growth 11/17/2021    BC  12/17/2019     Refer to previous culture of CXBLD from 12-17-19 @1120 for  susceptibility results      ORG Klebsiella pneumoniae ssp pneumoniae 11/17/2021    ORG Staphylococcus coagulase-negative 11/17/2021    ORG Klebsiella pneumoniae ssp pneumoniae 11/17/2021     Lab Results   Component Value Date    BLOODCULT2  11/17/2021     This organism was isolated in one set. Susceptibility testing is not routinely done as this  organism frequently represents skin contamination. Additional testing can be ordered by calling the  Microbiology Department. ORG Klebsiella pneumoniae ssp pneumoniae 11/17/2021    ORG Staphylococcus coagulase-negative 11/17/2021    ORG Klebsiella pneumoniae ssp pneumoniae 11/17/2021     Urine Culture, Routine   Date Value Ref Range Status   11/17/2021 <10,000 CFU/mL  Mixed gram positive organisms   (A)  Final   11/17/2021 >100,000 CFU/ml  Final   04/09/2021   Final    10 to 100,000 CFU/mL  Mixed rubio isolated. Further workup and sensitivity testing  is not routinely indicated and will not be performed.   Mixed rubio isolated includes:  Escherichia coli  Mixed gram positive organisms  Alpha hemolytic Strep species  Yeast  Nonhemolytic Strep species       FINAL IMPRESSION    Patient is a 73 y.o. female who presented with   Chief Complaint   Patient presents with    Urinary Tract Infection    and admitted for Sepsis due to urinary tract infection ([facility] [A41.9, N39.0]  Urinary tract infection without hematuria, site unspecified [N39.0]  Sepsis, due to unspecified organism, unspecified whether acute organ dysfunction present ([facility] [A41.9]  · Fevers BETTER  · Leukocytosis  BETTER  · UTI/ dysuria  MIXED GPO/KLEBSIELLA  · Gn septicemia/prob pyelonephritis   · History of kidney transplant  cr1  · Rule out viral infection   resp viral panel neg s/p LP neg  · ?aspiration   · Immunocompromised patient S/P LP SUGGEST ASEPTIC WITH 100 MONOCYTES    Plan:   · Continue zosyn day 6/8 (prob narrow to ceftriaxone to complete 2 weeks  rx for bacteremia )  · PICC 11/24 rue  · Monitor labs - watch creat   · Patient is [facility]  · Med rec done   · F/u 2 weeks        Imaging and labs were reviewed. Zeeshan Barrett was informed of their diagnosis, indications, risks and benefits of treatment. Zeeshan Barrett had the opportunity to ask questions. All questions were answered. Thank you for involving me in the care of Zeeshan Barrett. Please do not hesitate to call for any questions or concerns.     Electronically signed by Valentín Barba MD on 11/27/2021 at 3:23 PM    Phone [phone]  Fax [phone]

## 2021-11-27 NOTE — PROGRESS NOTES
[facility] MD Lacy Joseph MD Jonnie Cables, MD. Karime Pino MD   Progress Note    11/27/2021    SUBJECTIVE:   11/20 : Seen in her room , was sleeping on CPAP , no LE edema , skin wrinkling in LE . No JVD   Case d/w RN   UO good   Cr slightly better /stable     11/21 : seen today , on o2/nc more alert oriented . Weak . Cr down . No LE edema . Poor po intake . 11/22 : seen in her room , on RA , confused . Weak  Euvolemic , no LE edema . Poor po intake     11/23 : seen today , lying in bed on o2/nc . Weak , confused . No LE edema, appear euvolemic .    11/24 : seen in her room , poor po intake . O2/nc euvolemic , for mri today , remain confused , d/w Floor RN     11/26: Patient was seen and interviewed in room, she is awake alert and verbal oriented with no confusion. 11/27: Feeling \"better. \"  Denies complaint. No dyspnea on nasal cannula. Hoping for discharge today. ROS unremarkable. PROBLEM LIST:    Principal Problem:    Sepsis secondary to UTI [facility])  Active Problems:    Depression    Hypertension    Acute kidney injury superimposed on CKD (HCC)    Thyroid disease    Acute on chronic combined systolic (congestive) and diastolic (congestive) heart failure (HCC)  Resolved Problems:    * No resolved hospital problems. 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    Cerebrovascular disease     Clotting disorder ([facility] 1985    thrombophlebitis after c section delivery    Depression     15 years followed by dr Twyla Torres Hyperlipidemia     Hypertension     Hyperthyroidism     Leg swelling 9/3/2020    Lymphedema of both lower extremities 9/3/2020    Peripheral vascular disease ([facility]     Renal failure 11/2012    renal transplant    Thrombophlebitis     after c section delivery    Thyroid disease        Past Surgical History:   Procedure Laterality Date   [address]    one normal delivery    COLONOSCOPY      DIAGNOSTIC CARDIAC CATH LAB PROCEDURE  2006    normal coronaries and 1996 normal coronaries    DIALYSIS FISTULA CREATION  march and july 2012    phase one and two patient will start dialysis when needed   [address]    transfemoral venous bypass grafts    KIDNEY TRANSPLANT  2016   [facility]  2015   [facility]  2015    PARATHYROIDECTOMY  2006    left parathyroid  implant and parathyroidectomy       Family History   Problem Relation Age of Onset    Diabetes Mother     Diabetes Father     Dementia Father         reports that she quit smoking about 6 years ago. She has a 20.00 pack-year smoking history. She has never used smokeless tobacco. She reports that she does not drink alcohol and does not use drugs. Review of Systems:   Constitutional: no fevers , no chills , feels ok   Eyes: no eye pain , no itching , no drainage  Ears, nose, mouth, throat, and face: no ear ,nose pain , hearing is ok ,no nasal drainage   Respiratory: no sob ,no cough ,no wheezing . Cardiovascular: no chest pain , no palpitation ,no sob . Gastrointestinal: no nausea, vomiting , constipation , no abdominal pain . Genitourinary:no urinary retention , no burning , dysuria . No polyuria   Hematologic/lymphatic: no bleeding , no cougulation issues . Musculoskeletal:no joint pain , no swelling .    Neurological: no headaches ,no weakness , no numbness . Endocrine: no thirst , no weight issues .      MEDS (scheduled):    sodium chloride flush  5-40 mL IntraVENous 2 times per day    heparin flush  3 mL IntraVENous 2 times per day    piperacillin-tazobactam  3,375 mg IntraVENous Q8H    Arformoterol Tartrate  15 mcg Nebulization BID    budesonide  500 mcg Nebulization BID    allopurinol  100 mg Oral Daily    [Held by provider] cloZAPine  50 mg Oral Nightly    cycloSPORINE modified  100 mg Oral BID    desvenlafaxine succinate  50 mg Oral QPM    docusate sodium  100 mg Oral BID    folic acid  1 mg Oral Daily    levothyroxine  50 mcg Oral Daily    [Held by provider] lisinopril  5 mg Oral Daily    metoprolol succinate  50 mg Oral Daily    predniSONE  5 mg Oral Daily    atorvastatin  10 mg Oral Daily    heparin (porcine)  5,000 Units SubCUTAneous 3 times per day    ipratropium-albuterol  1 ampule Inhalation Q4H WA       MEDS (infusions):   sodium chloride      sodium chloride Stopped (11/26/21 1955)    sodium chloride 35 mL/hr at 11/24/21 2213       MEDS (prn):  sodium chloride flush, sodium chloride, heparin flush, ondansetron **OR** ondansetron, acetaminophen **OR** acetaminophen, polyethylene glycol    PHYSICAL EXAM:     Patient Vitals for the past 24 hrs:   BP Temp Temp src Pulse Resp SpO2   11/27/21 0841 (!) 155/68 97.1 °F (36.2 °C) Temporal 70 26 96 %   11/26/21 1755 -- -- -- -- -- 97 %   11/26/21 1731 (!) 164/85 97.7 °F (36.5 °C) Oral 76 16 96 %   11/26/21 1424 -- -- -- -- -- 96 %   @      Intake/Output Summary (Last 24 hours) at 11/27/2021 0854  Last data filed at 11/26/2021 2020  Gross per 24 hour   Intake 240 ml   Output 700 ml   Net -460 ml         Wt Readings from Last 3 Encounters:   11/17/21 227 lb 3.2 oz (103.1 kg)   08/16/21 229 lb (103.9 kg)   10/01/20 245 lb 8 oz (111.4 kg)       Constitutional:  in no acute distress  Oral: mucus membranes moist  Neck: no JVD  Cardiovascular: S1, S2 regular rhythm, no murmur,or rub  Respiratory:  No crackles, no wheeze  Gastrointestinal:  Soft, nontender, nondistended, NABS  Ext: no edema, feet warm  Skin: dry, no rash  Neuro: awake, alert, interactive      DATA:    Recent Labs     11/27/21  0640   WBC 6.9   HGB 10.5*   HCT 36.1   .4*        Recent Labs     11/25/21  0546 11/26/21  0540 11/27/21  0640    143 141   K 3.9 3.8 3.6    104 104   CO2 27 28 29   BUN 20 19 15   CREATININE 1.0 1.2* 1.1*   ALT 15 18 18   AST 14 19 18   BILITOT 1.0 0.8 0.7   ALKPHOS 56 57 56       No results found for: LABPROT    ASSESSMENT / RECOMMENDATIONS:      1. Acute kidney injury felt to be related to volume depletion in  context of urosepsis. Her BUN is 19 and creatinine is 1.2  2. Chronic kidney disease, stage 2 in the patient with kidney  transplant. Baseline creatinine 1.1 to 1.3 milligrams per deciliter. 3.  Status post kidney transplant, immunosuppressant status, chronically on CellCept 1 gm b.i.d.,  cyclosporine 100 mg b.i.d., and prednisone.   4.  Urosepsis with bacteremia management per ID service.         Cr down to 1.1 (close to baseline ), stable  UO ok      -Hep-Lock IV fluid  -hold MMF for now in light of bacteremia -- should be resumed at the latest at discharge.   -Continue prograf and prednisone  -encourage po intake   -PT/OT   -f/u serial bmp , UO         Electronically signed by Mandy Murcia MD on 11/27/2021 at 8:54 AM

## 2021-11-27 NOTE — CARE COORDINATION
SS NOTE: COVID NEGATIVE 11/17, PT WILL NEED A RAPID NEGATIVE COVID TO GO TO [facility]. Arrangements completed for pt to be transferred to [facility] today at [facility] via [employer]. Pt's friend made aware of this plan. For the SNF placement pt will need NO PRECERT needed, pt will need a signed GINA, current PT/OT notes and SW completed the HENs. AG [place].11/27/2021.2:57PM.

## 2021-11-28 NOTE — DISCHARGE SUMMARY
for  ischemia as an outpatient after her infection resolved. She was also on  acute on top of chronic CHF, was given Lasix. The echo was performed  during this admission, which revealed good ejection fraction of 55% to  60%, mild aortic regurgitation, mild aortic stenosis, and mild TR. The  patient also had hypercapnic respiratory failure. She was on BiPAP for  a while. She had altered mental status. After she was placed on  aerosol treatments and Pulmicort, her hypercapnic respiratory status was  better, but she was still drowsy. Lumbar puncture was performed when  she was felt to have aseptic meningitis and she was placed on acyclovir  IV per Infectious Disease. Lumbar puncture shows 100% monocytes and the  culture was negative for the lumbar puncture. Eventually, her mental  status was back to her baseline until normal.  She was alert, and awake,  and oriented x3. Her appetite was low. She was weak allover. She was  then discharged to [facility] for rehab in stable condition on the  following medications:  Zosyn 3.375 every eight hours. She already took  six days and she was supposed to have at least two more days and then  may be switched to Rocephin for two weeks total, per ID. Pulmicort  inhaler 500 mcg twice a day for 10 more days, allopurinol 100 mg daily,  Clozaril 50 mg daily, cyclosporine 100 twice a day, Pristiq 50 mg daily,  Colace 100 mg twice a day, Synthroid 50 mcg daily, Prinivil was on hold  due to her renal failure, prednisone 5 mg daily, Lipitor 10 mg daily,  and DuoNeb nebulizer every four hours as needed. The patient had also  acute renal failure secondary to sepsis. Her baseline creatinine is  between 1.1 to 1.3. It went up to 2.3 and upon discharge it was back to  normal at 1.1. Dr. Landy Arana was consulted.         Alyssa Vazquez MD    D: 11/27/2021 20:21:16       T: 11/27/2021 22:10:20     [id number]  Job#: [id number]     Doc#: [id number]    CC:

## 2021-12-01 NOTE — CARE COORDINATION
Patient/family seen: Yes       Informed patient/family of BPCI-A Medical Bundle Program with potential outreach by either Care Transitions Team or [facility]Health Team based on hospital admission and location. BPCI-A Notification Letter given: Yes         Current discharge plan: SNF    Left a very detailed message explaining letter to patients friend Xuan. I am mailing the letter to patients home c/o [facility] as patient is already discharged.  Electronically signed by Vivian Painting on 12/1/2021 at 10:07 AM

## 2022-01-10 ENCOUNTER — HOSPITAL ENCOUNTER (INPATIENT)
Age: 67
LOS: 14 days | Discharge: ANOTHER HOSPITAL | DRG: 673 | End: 2022-01-25
Attending: STUDENT IN AN ORGANIZED HEALTH CARE EDUCATION/TRAINING PROGRAM | Admitting: INTERNAL MEDICINE
Payer: MEDICARE

## 2022-01-10 ENCOUNTER — APPOINTMENT (OUTPATIENT)
Dept: CT IMAGING | Age: 67
DRG: 673 | End: 2022-01-10
Payer: MEDICARE

## 2022-01-10 DIAGNOSIS — Z94.0 TRANSPLANTED KIDNEY: ICD-10-CM

## 2022-01-10 DIAGNOSIS — N17.9 AKI (ACUTE KIDNEY INJURY) (HCC): Primary | ICD-10-CM

## 2022-01-10 LAB
ALBUMIN SERPL-MCNC: 2.8 G/DL (ref 3.5–5.2)
ALP BLD-CCNC: 109 U/L (ref 35–104)
ALT SERPL-CCNC: 33 U/L (ref 0–32)
ANION GAP SERPL CALCULATED.3IONS-SCNC: 14 MMOL/L (ref 7–16)
AST SERPL-CCNC: 70 U/L (ref 0–31)
BASOPHILS ABSOLUTE: 0.04 E9/L (ref 0–0.2)
BASOPHILS RELATIVE PERCENT: 0.2 % (ref 0–2)
BILIRUB SERPL-MCNC: 0.8 MG/DL (ref 0–1.2)
BUN BLDV-MCNC: 32 MG/DL (ref 6–23)
CALCIUM SERPL-MCNC: 9.8 MG/DL (ref 8.6–10.2)
CHLORIDE BLD-SCNC: 100 MMOL/L (ref 98–107)
CO2: 21 MMOL/L (ref 22–29)
CREAT SERPL-MCNC: 2.5 MG/DL (ref 0.5–1)
EOSINOPHILS ABSOLUTE: 0.23 E9/L (ref 0.05–0.5)
EOSINOPHILS RELATIVE PERCENT: 1.3 % (ref 0–6)
GFR AFRICAN AMERICAN: 23
GFR NON-AFRICAN AMERICAN: 19 ML/MIN/1.73
GLUCOSE BLD-MCNC: 83 MG/DL (ref 74–99)
HCT VFR BLD CALC: 35.9 % (ref 34–48)
HEMOGLOBIN: 11.2 G/DL (ref 11.5–15.5)
IMMATURE GRANULOCYTES #: 0.1 E9/L
IMMATURE GRANULOCYTES %: 0.6 % (ref 0–5)
LACTIC ACID, SEPSIS: 0.8 MMOL/L (ref 0.5–1.9)
LACTIC ACID: 0.8 MMOL/L (ref 0.5–2.2)
LYMPHOCYTES ABSOLUTE: 0.92 E9/L (ref 1.5–4)
LYMPHOCYTES RELATIVE PERCENT: 5.2 % (ref 20–42)
MCH RBC QN AUTO: 32.3 PG (ref 26–35)
MCHC RBC AUTO-ENTMCNC: 31.2 % (ref 32–34.5)
MCV RBC AUTO: 103.5 FL (ref 80–99.9)
MONOCYTES ABSOLUTE: 0.9 E9/L (ref 0.1–0.95)
MONOCYTES RELATIVE PERCENT: 5 % (ref 2–12)
NEUTROPHILS ABSOLUTE: 15.66 E9/L (ref 1.8–7.3)
NEUTROPHILS RELATIVE PERCENT: 87.7 % (ref 43–80)
PDW BLD-RTO: 15.9 FL (ref 11.5–15)
PLATELET # BLD: 183 E9/L (ref 130–450)
PMV BLD AUTO: 13.9 FL (ref 7–12)
POTASSIUM REFLEX MAGNESIUM: 5.5 MMOL/L (ref 3.5–5)
RBC # BLD: 3.47 E12/L (ref 3.5–5.5)
REASON FOR REJECTION: NORMAL
REJECTED TEST: NORMAL
SARS-COV-2, NAAT: NOT DETECTED
SODIUM BLD-SCNC: 135 MMOL/L (ref 132–146)
TOTAL PROTEIN: 6.1 G/DL (ref 6.4–8.3)
TROPONIN, HIGH SENSITIVITY: 55 NG/L (ref 0–9)
WBC # BLD: 17.9 E9/L (ref 4.5–11.5)

## 2022-01-10 PROCEDURE — 80053 COMPREHEN METABOLIC PANEL: CPT

## 2022-01-10 PROCEDURE — 2580000003 HC RX 258: Performed by: STUDENT IN AN ORGANIZED HEALTH CARE EDUCATION/TRAINING PROGRAM

## 2022-01-10 PROCEDURE — 99283 EMERGENCY DEPT VISIT LOW MDM: CPT

## 2022-01-10 PROCEDURE — 74176 CT ABD & PELVIS W/O CONTRAST: CPT

## 2022-01-10 PROCEDURE — 85025 COMPLETE CBC W/AUTO DIFF WBC: CPT

## 2022-01-10 PROCEDURE — 87635 SARS-COV-2 COVID-19 AMP PRB: CPT

## 2022-01-10 PROCEDURE — 36415 COLL VENOUS BLD VENIPUNCTURE: CPT

## 2022-01-10 PROCEDURE — 83605 ASSAY OF LACTIC ACID: CPT

## 2022-01-10 PROCEDURE — 84484 ASSAY OF TROPONIN QUANT: CPT

## 2022-01-10 RX ORDER — ONDANSETRON 4 MG/1
4 TABLET, ORALLY DISINTEGRATING ORAL ONCE
Status: DISCONTINUED | OUTPATIENT
Start: 2022-01-10 | End: 2022-01-25 | Stop reason: HOSPADM

## 2022-01-10 RX ORDER — 0.9 % SODIUM CHLORIDE 0.9 %
500 INTRAVENOUS SOLUTION INTRAVENOUS ONCE
Status: COMPLETED | OUTPATIENT
Start: 2022-01-10 | End: 2022-01-10

## 2022-01-10 RX ADMIN — SODIUM CHLORIDE 500 ML: 9 INJECTION, SOLUTION INTRAVENOUS at 22:21

## 2022-01-10 NOTE — ED PROVIDER NOTES
abdominal tenderness. There is no guarding or rebound. Musculoskeletal:      Cervical back: Normal range of motion and neck supple. No rigidity or tenderness. No muscular tenderness. Right lower leg: No edema. Left lower leg: No edema. Skin:     General: Skin is warm and dry. Capillary Refill: Capillary refill takes less than 2 seconds. Coloration: Skin is not pale. Findings: No erythema or rash. Neurological:      Mental Status: She is alert and oriented to person, place, and time. Psychiatric:         Mood and Affect: Mood normal.          Procedures     MDM  Number of Diagnoses or Management Options  CECILY (acute kidney injury) ([facility] [address]  Diagnosis management comments: Daija Barron is a 73year old female who presented to ED with concern for fatigue. Patient was found to be afebrile with a leukocytosis, patient did not have obvious source of infection, patient appeared dehydrated and was given IVF, patient found to have CECILY CT was unremarkable for acute process. Blood cultures pending, patient afebrile in ED, consult placed to nephrology  Patient will be admitted for CECILY and fatigue  Patient did have elevated stable troponin without chest pain likely secondary to her CECILY  Discussed results and plan with patient she is agreeable to admission. Amount and/or Complexity of Data Reviewed  Clinical lab tests: reviewed  Tests in the radiology section of CPT®: reviewed  Tests in the medicine section of CPT®: reviewed         ED Course as of 01/11/22 1528 Tue Jan 11, 2022 0109 EKG: This EKG is signed and interpreted by me. Rate: 88  Rhythm: Sinus  Interpretation: non-specific EKG, left axis deviation, LBBB, no ST elevation  Comparison: changes compared to previous EKG, LBBB present on previous EKG   [SS]      ED Course User Index  [SS] Jo Ann Foster MD        ED Course as of 01/11/22 1528 Tue Jan 11, 2022 0109 EKG: This EKG is signed and interpreted by me.     Rate: 88  Rhythm: Sinus  Interpretation: non-specific EKG, left axis deviation, LBBB, no ST elevation  Comparison: changes compared to previous EKG, LBBB present on previous EKG   [SS]      ED Course User Index  [SS] Yovani Martinez MD       --------------------------------------------- PAST HISTORY ---------------------------------------------  Past Medical History:  has a past medical history of Abnormal EKG, Acute gout involving toe of right foot, Acute gout involving toe of right foot, Acute on chronic combined systolic (congestive) and diastolic (congestive) heart failure ([address] Cancer ([facility] [place] [address] Cerebrovascular disease, Clotting disorder ([facility] [address] Depression, Hyperlipidemia, Hypertension, Hyperthyroidism, Leg swelling, Lymphedema of both lower extremities, Peripheral vascular disease ([address] Renal failure, Thrombophlebitis, and Thyroid disease. Past Surgical History:  has a past surgical history that includes Diagnostic Cardiac Cath Lab Procedure (); femoral bypass (); parathyroidectomy (); Colonoscopy;  section (); Dialysis fistula creation (march and 2012); Kidney transplant (); Kidney transplant (); and Kidney transplant (). Social History:  reports that she quit smoking about 6 years ago. She has a 20.00 pack-year smoking history. She has never used smokeless tobacco. She reports that she does not drink alcohol and does not use drugs. Family History: family history includes Dementia in her father; Diabetes in her father and mother. The patients home medications have been reviewed.     Allergies: Macrodantin [nitrofurantoin macrocrystal] and Sulfa antibiotics    -------------------------------------------------- RESULTS -------------------------------------------------    LABS:  Results for orders placed or performed during the hospital encounter of 01/10/22   COVID-19, Rapid    Specimen: Nasopharyngeal Swab   Result Value Ref Range    SARS-CoV-2, NAAT Not Detected Not Detected   CBC auto differential   Result Value Ref Range    WBC 17.9 (H) 4.5 - 11.5 E9/L    RBC 3.47 (L) 3.50 - 5.50 E12/L    Hemoglobin 11.2 (L) 11.5 - 15.5 g/dL    Hematocrit 35.9 34.0 - 48.0 %    .5 (H) 80.0 - 99.9 fL    MCH 32.3 26.0 - 35.0 pg    MCHC 31.2 (L) 32.0 - 34.5 %    RDW 15.9 (H) 11.5 - 15.0 fL    Platelets 054 182 - 812 E9/L    MPV 13.9 (H) 7.0 - 12.0 fL    Neutrophils % 87.7 (H) 43.0 - 80.0 %    Immature Granulocytes % 0.6 0.0 - 5.0 %    Lymphocytes % 5.2 (L) 20.0 - 42.0 %    Monocytes % 5.0 2.0 - 12.0 %    Eosinophils % 1.3 0.0 - 6.0 %    Basophils % 0.2 0.0 - 2.0 %    Neutrophils Absolute 15.66 (H) 1.80 - 7.30 E9/L    Immature Granulocytes # 0.10 E9/L    Lymphocytes Absolute 0.92 (L) 1.50 - 4.00 E9/L    Monocytes Absolute 0.90 0.10 - 0.95 E9/L    Eosinophils Absolute 0.23 0.05 - 0.50 E9/L    Basophils Absolute 0.04 0.00 - 0.20 E9/L   Comprehensive Metabolic Panel w/ Reflex to MG   Result Value Ref Range    Sodium 135 132 - 146 mmol/L    Potassium reflex Magnesium 5.5 (H) 3.5 - 5.0 mmol/L    Chloride 100 98 - 107 mmol/L    CO2 21 (L) 22 - 29 mmol/L    Anion Gap 14 7 - 16 mmol/L    Glucose 83 74 - 99 mg/dL    BUN 32 (H) 6 - 23 mg/dL    CREATININE 2.5 (H) 0.5 - 1.0 mg/dL    GFR Non-African American 19 >=60 mL/min/1.73    GFR African American 23     Calcium 9.8 8.6 - 10.2 mg/dL    Total Protein 6.1 (L) 6.4 - 8.3 g/dL    Albumin 2.8 (L) 3.5 - 5.2 g/dL    Total Bilirubin 0.8 0.0 - 1.2 mg/dL    Alkaline Phosphatase 109 (H) 35 - 104 U/L    ALT 33 (H) 0 - 32 U/L    AST 70 (H) 0 - 31 U/L   Lactic Acid, Plasma   Result Value Ref Range    Lactic Acid 0.8 0.5 - 2.2 mmol/L   Lactate, Sepsis   Result Value Ref Range    Lactic Acid, Sepsis 0.8 0.5 - 1.9 mmol/L   Urinalysis with Microscopic   Result Value Ref Range    Color, UA Yellow Straw/Yellow    Clarity, UA Clear Clear    Glucose, Ur Negative Negative mg/dL    Bilirubin Urine SMALL (A) Negative    Ketones, Urine Negative Negative mg/dL    Specific Gravity, UA 1.020 1.005 - 1.030    Blood, Urine TRACE (A) Negative    pH, UA 5.5 5.0 - 9.0    Protein, UA TRACE Negative mg/dL    Urobilinogen, Urine 1.0 <2.0 E.U./dL    Nitrite, Urine Negative Negative    Leukocyte Esterase, Urine Negative Negative    WBC, UA 0-1 0 - 5 /HPF    RBC, UA 0-1 0 - 2 /HPF    Bacteria, UA NONE SEEN None Seen /HPF   Troponin   Result Value Ref Range    Troponin, High Sensitivity 55 (H) 0 - 9 ng/L   SPECIMEN REJECTION   Result Value Ref Range    Rejected Test trp5     Reason for Rejection see below    Troponin   Result Value Ref Range    Troponin, High Sensitivity 59 (H) 0 - 9 ng/L   Lactic acid, plasma   Result Value Ref Range    Lactic Acid 1.3 0.5 - 2.2 mmol/L   Blood Gas, Arterial   Result Value Ref Range    Date Analyzed [id number]     Time Analyzed 0834     Source: Blood Arterial     pH, Blood Gas 7.368 7.350 - 7.450    PCO2 41.4 35.0 - 45.0 mmHg    PO2 57.7 (L) 75.0 - 100.0 mmHg    HCO3 23.3 22.0 - 26.0 mmol/L    B.E. -1.9 -3.0 - 3.0 mmol/L    O2 Sat 90.4 (L) 92.0 - 98.5 %    O2Hb 88.8 (L) 94.0 - 97.0 %    COHb 1.6 (H) 0.0 - 1.5 %    MetHb 0.2 0.0 - 1.5 %    O2 Content 14.9 mL/dL    HHb 9.4 (H) 0.0 - 5.0 %    tHb (est) 11.9 11.5 - 16.5 g/dL    Mode RA     Date Of Collection      Time Collected      Pt Temp 37.0 C          Lab [id number]     Critical(s) Notified . No Critical Values    Creatinine, Random Urine   Result Value Ref Range    Creatinine, Ur 109 29 - 226 mg/dL   Chloride, urine, random   Result Value Ref Range    Chloride 42 Not Established mmol/L   Sodium, urine, random   Result Value Ref Range    Sodium, Ur 63 Not Established mmol/L   EKG 12 Lead   Result Value Ref Range    Ventricular Rate 88 BPM    Atrial Rate 88 BPM    P-R Interval 158 ms    QRS Duration 150 ms    Q-T Interval 396 ms    QTc Calculation (Bazett) 479 ms    P Axis 57 degrees    R Axis -45 degrees    T Axis 118 degrees       RADIOLOGY:  XR CHEST PORTABLE   Final Result   1. Stable cardiomegaly. There is no evidence of failure or pneumonia. CT ABDOMEN PELVIS WO CONTRAST Additional Contrast? None   Final Result   No significant fluid within the large bowel. No hydronephrosis of the transplant kidneys. The native kidneys are atrophic   and have multiple intermediate density cysts which should be further   evaluated with nonemergent ultrasound. Atherosclerotic disease. IR MIDLINE CATH    (Results Pending)       EKG: This EKG is signed and interpreted by me. Rate: 88  Rhythm: Sinus  Interpretation: non-specific EKG, no ST elevation, left axis deviation  Comparison: stable as compared to patient's most recent EKG      ------------------------- NURSING NOTES AND VITALS REVIEWED ---------------------------  Date / Time Roomed:  1/10/2022  6:12 PM  ED Bed Assignment:  0515/0515-02    The nursing notes within the ED encounter and vital signs as below have been reviewed. Patient Vitals for the past 24 hrs:   BP Temp Temp src Pulse Resp SpO2 Height Weight   01/11/22 0825 136/60 98.9 °F (37.2 °C) Axillary 87 19 95 % -- --   01/11/22 0556 (!) 152/74 99.3 °F (37.4 °C) Temporal 91 25 93 % -- --   01/11/22 0547 -- -- -- -- -- -- 5' 2\" (1.575 m) 218 lb 12.8 oz (99.2 kg)   01/11/22 0343 (!) 148/75 -- -- 87 14 95 % -- --   01/10/22 1829 (!) 156/73 99.4 °F (37.4 °C) -- 82 16 97 % 5' 2\" (1.575 m) 200 lb (90.7 kg)       Oxygen Saturation Interpretation: Normal    ------------------------------------------ PROGRESS NOTES ------------------------------------------  Re-evaluation(s):  Time: 8pm  Patients symptoms show no change  Repeat physical examination is not changed    Counseling:  I have spoken with the patient and discussed todays results, in addition to providing specific details for the plan of care and counseling regarding the diagnosis and prognosis.   Their questions are answered at this time and they are agreeable with the plan of admission.    --------------------------------- ADDITIONAL PROVIDER NOTES ---------------------------------  Consultations:  Spoke with Dr. Joan Valles. Discussed case. They will admit the patient. This patient's ED course included: a personal history and physicial examination, re-evaluation prior to disposition, multiple bedside re-evaluations and IV medications    This patient has remained hemodynamically stable during their ED course. Diagnosis:  1. CECILY (acute kidney injury) ([facility] [address]    2. Transplanted kidney        Disposition:  Patient's disposition: Admit to telemetry  Patient's condition is stable.          Yuki Mcghee MD  01/11/22 3620

## 2022-01-11 ENCOUNTER — APPOINTMENT (OUTPATIENT)
Dept: GENERAL RADIOLOGY | Age: 67
DRG: 673 | End: 2022-01-11
Payer: MEDICARE

## 2022-01-11 PROBLEM — N17.9 AKI (ACUTE KIDNEY INJURY) (HCC): Status: ACTIVE | Noted: 2022-01-11

## 2022-01-11 LAB
ADENOVIRUS BY PCR: NOT DETECTED
ALBUMIN SERPL-MCNC: 3 G/DL (ref 3.5–5.2)
ALP BLD-CCNC: 138 U/L (ref 35–104)
ALT SERPL-CCNC: 26 U/L (ref 0–32)
ANION GAP SERPL CALCULATED.3IONS-SCNC: 11 MMOL/L (ref 7–16)
ANION GAP SERPL CALCULATED.3IONS-SCNC: 15 MMOL/L (ref 7–16)
AST SERPL-CCNC: 39 U/L (ref 0–31)
B.E.: -1.9 MMOL/L (ref -3–3)
BACTERIA: ABNORMAL /HPF
BILIRUB SERPL-MCNC: 1 MG/DL (ref 0–1.2)
BILIRUBIN URINE: ABNORMAL
BLOOD, URINE: ABNORMAL
BORDETELLA PARAPERTUSSIS BY PCR: NOT DETECTED
BORDETELLA PERTUSSIS BY PCR: NOT DETECTED
BUN BLDV-MCNC: 26 MG/DL (ref 6–23)
BUN BLDV-MCNC: 28 MG/DL (ref 6–23)
CALCIUM SERPL-MCNC: 9.6 MG/DL (ref 8.6–10.2)
CALCIUM SERPL-MCNC: 9.7 MG/DL (ref 8.6–10.2)
CHLAMYDOPHILIA PNEUMONIAE BY PCR: NOT DETECTED
CHLORIDE BLD-SCNC: 105 MMOL/L (ref 98–107)
CHLORIDE BLD-SCNC: 105 MMOL/L (ref 98–107)
CHLORIDE URINE RANDOM: 42 MMOL/L
CLARITY: CLEAR
CO2: 22 MMOL/L (ref 22–29)
CO2: 22 MMOL/L (ref 22–29)
COHB: 1.6 % (ref 0–1.5)
COLOR: YELLOW
CORONAVIRUS 229E BY PCR: NOT DETECTED
CORONAVIRUS HKU1 BY PCR: NOT DETECTED
CORONAVIRUS NL63 BY PCR: NOT DETECTED
CORONAVIRUS OC43 BY PCR: NOT DETECTED
CREAT SERPL-MCNC: 1.9 MG/DL (ref 0.5–1)
CREAT SERPL-MCNC: 2 MG/DL (ref 0.5–1)
CREATININE URINE: 109 MG/DL (ref 29–226)
CRITICAL: ABNORMAL
DATE ANALYZED: ABNORMAL
DATE OF COLLECTION: ABNORMAL
EKG ATRIAL RATE: 88 BPM
EKG P AXIS: 57 DEGREES
EKG P-R INTERVAL: 158 MS
EKG Q-T INTERVAL: 396 MS
EKG QRS DURATION: 150 MS
EKG QTC CALCULATION (BAZETT): 479 MS
EKG R AXIS: -45 DEGREES
EKG T AXIS: 118 DEGREES
EKG VENTRICULAR RATE: 88 BPM
GFR AFRICAN AMERICAN: 30
GFR AFRICAN AMERICAN: 32
GFR NON-AFRICAN AMERICAN: 25 ML/MIN/1.73
GFR NON-AFRICAN AMERICAN: 26 ML/MIN/1.73
GLUCOSE BLD-MCNC: 129 MG/DL (ref 74–99)
GLUCOSE BLD-MCNC: 97 MG/DL (ref 74–99)
GLUCOSE URINE: NEGATIVE MG/DL
HCO3: 23.3 MMOL/L (ref 22–26)
HHB: 9.4 % (ref 0–5)
HUMAN METAPNEUMOVIRUS BY PCR: NOT DETECTED
HUMAN RHINOVIRUS/ENTEROVIRUS BY PCR: NOT DETECTED
INFLUENZA A BY PCR: NOT DETECTED
INFLUENZA B BY PCR: NOT DETECTED
KETONES, URINE: NEGATIVE MG/DL
LAB: ABNORMAL
LACTIC ACID: 1.3 MMOL/L (ref 0.5–2.2)
LEUKOCYTE ESTERASE, URINE: NEGATIVE
Lab: ABNORMAL
MAGNESIUM: 2 MG/DL (ref 1.6–2.6)
METHB: 0.2 % (ref 0–1.5)
MODE: ABNORMAL
MYCOPLASMA PNEUMONIAE BY PCR: NOT DETECTED
NITRITE, URINE: NEGATIVE
O2 CONTENT: 14.9 ML/DL
O2 SATURATION: 90.4 % (ref 92–98.5)
O2HB: 88.8 % (ref 94–97)
OPERATOR ID: 797
PARAINFLUENZA VIRUS 1 BY PCR: NOT DETECTED
PARAINFLUENZA VIRUS 2 BY PCR: NOT DETECTED
PARAINFLUENZA VIRUS 3 BY PCR: NOT DETECTED
PARAINFLUENZA VIRUS 4 BY PCR: NOT DETECTED
PATIENT TEMP: 37 C
PCO2: 41.4 MMHG (ref 35–45)
PH BLOOD GAS: 7.37 (ref 7.35–7.45)
PH UA: 5.5 (ref 5–9)
PHOSPHORUS: 3.3 MG/DL (ref 2.5–4.5)
PO2: 57.7 MMHG (ref 75–100)
POTASSIUM SERPL-SCNC: 4.2 MMOL/L (ref 3.5–5)
POTASSIUM SERPL-SCNC: 4.2 MMOL/L (ref 3.5–5)
PROCALCITONIN: 0.14 NG/ML (ref 0–0.08)
PROTEIN UA: ABNORMAL MG/DL
RBC UA: ABNORMAL /HPF (ref 0–2)
RESPIRATORY SYNCYTIAL VIRUS BY PCR: NOT DETECTED
SARS-COV-2, PCR: NOT DETECTED
SODIUM BLD-SCNC: 138 MMOL/L (ref 132–146)
SODIUM BLD-SCNC: 142 MMOL/L (ref 132–146)
SODIUM URINE: 63 MMOL/L
SOURCE, BLOOD GAS: ABNORMAL
SPECIFIC GRAVITY UA: 1.02 (ref 1–1.03)
THB: 11.9 G/DL (ref 11.5–16.5)
TIME ANALYZED: 834
TOTAL PROTEIN: 6.1 G/DL (ref 6.4–8.3)
TROPONIN, HIGH SENSITIVITY: 59 NG/L (ref 0–9)
UROBILINOGEN, URINE: 1 E.U./DL
WBC UA: ABNORMAL /HPF (ref 0–5)

## 2022-01-11 PROCEDURE — 83605 ASSAY OF LACTIC ACID: CPT

## 2022-01-11 PROCEDURE — 80053 COMPREHEN METABOLIC PANEL: CPT

## 2022-01-11 PROCEDURE — 87040 BLOOD CULTURE FOR BACTERIA: CPT

## 2022-01-11 PROCEDURE — 97161 PT EVAL LOW COMPLEX 20 MIN: CPT | Performed by: PHYSICAL THERAPIST

## 2022-01-11 PROCEDURE — 2700000000 HC OXYGEN THERAPY PER DAY

## 2022-01-11 PROCEDURE — 0202U NFCT DS 22 TRGT SARS-COV-2: CPT

## 2022-01-11 PROCEDURE — 80048 BASIC METABOLIC PNL TOTAL CA: CPT

## 2022-01-11 PROCEDURE — 93005 ELECTROCARDIOGRAM TRACING: CPT | Performed by: STUDENT IN AN ORGANIZED HEALTH CARE EDUCATION/TRAINING PROGRAM

## 2022-01-11 PROCEDURE — 82570 ASSAY OF URINE CREATININE: CPT

## 2022-01-11 PROCEDURE — 2580000003 HC RX 258: Performed by: STUDENT IN AN ORGANIZED HEALTH CARE EDUCATION/TRAINING PROGRAM

## 2022-01-11 PROCEDURE — 6360000002 HC RX W HCPCS: Performed by: INTERNAL MEDICINE

## 2022-01-11 PROCEDURE — C1751 CATH, INF, PER/CENT/MIDLINE: HCPCS

## 2022-01-11 PROCEDURE — 76937 US GUIDE VASCULAR ACCESS: CPT

## 2022-01-11 PROCEDURE — 36415 COLL VENOUS BLD VENIPUNCTURE: CPT

## 2022-01-11 PROCEDURE — 83735 ASSAY OF MAGNESIUM: CPT

## 2022-01-11 PROCEDURE — 05HB33Z INSERTION OF INFUSION DEVICE INTO RIGHT BASILIC VEIN, PERCUTANEOUS APPROACH: ICD-10-PCS | Performed by: INTERNAL MEDICINE

## 2022-01-11 PROCEDURE — 2580000003 HC RX 258: Performed by: SPECIALIST

## 2022-01-11 PROCEDURE — 2500000003 HC RX 250 WO HCPCS: Performed by: INTERNAL MEDICINE

## 2022-01-11 PROCEDURE — 36600 WITHDRAWAL OF ARTERIAL BLOOD: CPT

## 2022-01-11 PROCEDURE — 84300 ASSAY OF URINE SODIUM: CPT

## 2022-01-11 PROCEDURE — XW033E5 INTRODUCTION OF REMDESIVIR ANTI-INFECTIVE INTO PERIPHERAL VEIN, PERCUTANEOUS APPROACH, NEW TECHNOLOGY GROUP 5: ICD-10-PCS | Performed by: INTERNAL MEDICINE

## 2022-01-11 PROCEDURE — 84484 ASSAY OF TROPONIN QUANT: CPT

## 2022-01-11 PROCEDURE — 81001 URINALYSIS AUTO W/SCOPE: CPT

## 2022-01-11 PROCEDURE — 2580000003 HC RX 258: Performed by: INTERNAL MEDICINE

## 2022-01-11 PROCEDURE — 84100 ASSAY OF PHOSPHORUS: CPT

## 2022-01-11 PROCEDURE — 6370000000 HC RX 637 (ALT 250 FOR IP): Performed by: INTERNAL MEDICINE

## 2022-01-11 PROCEDURE — 2060000000 HC ICU INTERMEDIATE R&B

## 2022-01-11 PROCEDURE — 6360000002 HC RX W HCPCS: Performed by: SPECIALIST

## 2022-01-11 PROCEDURE — 36410 VNPNXR 3YR/> PHY/QHP DX/THER: CPT

## 2022-01-11 PROCEDURE — 97165 OT EVAL LOW COMPLEX 30 MIN: CPT | Performed by: OCCUPATIONAL THERAPIST

## 2022-01-11 PROCEDURE — 97530 THERAPEUTIC ACTIVITIES: CPT | Performed by: OCCUPATIONAL THERAPIST

## 2022-01-11 PROCEDURE — 82436 ASSAY OF URINE CHLORIDE: CPT

## 2022-01-11 PROCEDURE — 82805 BLOOD GASES W/O2 SATURATION: CPT

## 2022-01-11 PROCEDURE — 84145 PROCALCITONIN (PCT): CPT

## 2022-01-11 PROCEDURE — 71045 X-RAY EXAM CHEST 1 VIEW: CPT

## 2022-01-11 RX ORDER — BUDESONIDE 0.5 MG/2ML
500 INHALANT ORAL 2 TIMES DAILY
Status: DISCONTINUED | OUTPATIENT
Start: 2022-01-11 | End: 2022-01-25 | Stop reason: HOSPADM

## 2022-01-11 RX ORDER — METOPROLOL SUCCINATE 50 MG/1
50 TABLET, EXTENDED RELEASE ORAL DAILY
Status: DISCONTINUED | OUTPATIENT
Start: 2022-01-11 | End: 2022-01-16

## 2022-01-11 RX ORDER — ONDANSETRON 2 MG/ML
4 INJECTION INTRAMUSCULAR; INTRAVENOUS EVERY 6 HOURS PRN
Status: DISCONTINUED | OUTPATIENT
Start: 2022-01-11 | End: 2022-01-25 | Stop reason: HOSPADM

## 2022-01-11 RX ORDER — SODIUM CHLORIDE 9 MG/ML
25 INJECTION, SOLUTION INTRAVENOUS PRN
Status: DISCONTINUED | OUTPATIENT
Start: 2022-01-11 | End: 2022-01-15 | Stop reason: SDUPTHER

## 2022-01-11 RX ORDER — ONDANSETRON 4 MG/1
4 TABLET, ORALLY DISINTEGRATING ORAL EVERY 8 HOURS PRN
Status: DISCONTINUED | OUTPATIENT
Start: 2022-01-11 | End: 2022-01-25 | Stop reason: HOSPADM

## 2022-01-11 RX ORDER — FOLIC ACID 1 MG/1
1 TABLET ORAL DAILY
Status: DISCONTINUED | OUTPATIENT
Start: 2022-01-11 | End: 2022-01-25 | Stop reason: HOSPADM

## 2022-01-11 RX ORDER — SODIUM CHLORIDE 0.9 % (FLUSH) 0.9 %
5-40 SYRINGE (ML) INJECTION PRN
Status: DISCONTINUED | OUTPATIENT
Start: 2022-01-11 | End: 2022-01-24 | Stop reason: SDUPTHER

## 2022-01-11 RX ORDER — HEPARIN SODIUM (PORCINE) LOCK FLUSH IV SOLN 100 UNIT/ML 100 UNIT/ML
1 SOLUTION INTRAVENOUS PRN
Status: DISCONTINUED | OUTPATIENT
Start: 2022-01-11 | End: 2022-01-24 | Stop reason: SDUPTHER

## 2022-01-11 RX ORDER — DOCUSATE SODIUM 100 MG/1
100 CAPSULE, LIQUID FILLED ORAL 2 TIMES DAILY
Status: DISCONTINUED | OUTPATIENT
Start: 2022-01-11 | End: 2022-01-16 | Stop reason: CLARIF

## 2022-01-11 RX ORDER — MYCOPHENOLATE MOFETIL 250 MG/1
1000 CAPSULE ORAL 2 TIMES DAILY
Status: DISCONTINUED | OUTPATIENT
Start: 2022-01-11 | End: 2022-01-11

## 2022-01-11 RX ORDER — SODIUM CHLORIDE 0.9 % (FLUSH) 0.9 %
5-40 SYRINGE (ML) INJECTION PRN
Status: DISCONTINUED | OUTPATIENT
Start: 2022-01-11 | End: 2022-01-15 | Stop reason: SDUPTHER

## 2022-01-11 RX ORDER — HEPARIN SODIUM 5000 [USP'U]/ML
5000 INJECTION, SOLUTION INTRAVENOUS; SUBCUTANEOUS 2 TIMES DAILY
Status: DISCONTINUED | OUTPATIENT
Start: 2022-01-11 | End: 2022-01-25 | Stop reason: HOSPADM

## 2022-01-11 RX ORDER — PREDNISONE 1 MG/1
5 TABLET ORAL DAILY
Status: DISCONTINUED | OUTPATIENT
Start: 2022-01-11 | End: 2022-01-13

## 2022-01-11 RX ORDER — CYCLOSPORINE 25 MG/1
100 CAPSULE, GELATIN COATED ORAL 2 TIMES DAILY
Status: DISCONTINUED | OUTPATIENT
Start: 2022-01-11 | End: 2022-01-11

## 2022-01-11 RX ORDER — SODIUM CHLORIDE 0.9 % (FLUSH) 0.9 %
5-40 SYRINGE (ML) INJECTION EVERY 12 HOURS SCHEDULED
Status: DISCONTINUED | OUTPATIENT
Start: 2022-01-11 | End: 2022-01-15 | Stop reason: SDUPTHER

## 2022-01-11 RX ORDER — SODIUM CHLORIDE 9 MG/ML
INJECTION, SOLUTION INTRAVENOUS CONTINUOUS
Status: DISCONTINUED | OUTPATIENT
Start: 2022-01-11 | End: 2022-01-13

## 2022-01-11 RX ORDER — CLOZAPINE 25 MG/1
50 TABLET ORAL NIGHTLY
Status: DISCONTINUED | OUTPATIENT
Start: 2022-01-11 | End: 2022-01-14

## 2022-01-11 RX ORDER — LISINOPRIL 5 MG/1
TABLET ORAL
Status: ON HOLD | COMMUNITY
Start: 2021-12-08 | End: 2022-03-21 | Stop reason: HOSPADM

## 2022-01-11 RX ORDER — 0.9 % SODIUM CHLORIDE 0.9 %
500 INTRAVENOUS SOLUTION INTRAVENOUS ONCE
Status: COMPLETED | OUTPATIENT
Start: 2022-01-11 | End: 2022-01-11

## 2022-01-11 RX ORDER — IPRATROPIUM BROMIDE AND ALBUTEROL SULFATE 2.5; .5 MG/3ML; MG/3ML
3 SOLUTION RESPIRATORY (INHALATION) EVERY 6 HOURS PRN
Status: DISCONTINUED | OUTPATIENT
Start: 2022-01-11 | End: 2022-01-18

## 2022-01-11 RX ORDER — PREDNISONE 1 MG/1
5 TABLET ORAL DAILY
Status: DISCONTINUED | OUTPATIENT
Start: 2022-01-11 | End: 2022-01-22

## 2022-01-11 RX ORDER — ACETAMINOPHEN 650 MG/1
650 SUPPOSITORY RECTAL EVERY 6 HOURS PRN
Status: DISCONTINUED | OUTPATIENT
Start: 2022-01-11 | End: 2022-01-25 | Stop reason: HOSPADM

## 2022-01-11 RX ORDER — PREGABALIN 75 MG/1
CAPSULE ORAL
Status: ON HOLD | COMMUNITY
Start: 2021-12-26 | End: 2022-03-21 | Stop reason: HOSPADM

## 2022-01-11 RX ORDER — POLYETHYLENE GLYCOL 3350 17 G/17G
17 POWDER, FOR SOLUTION ORAL DAILY PRN
Status: DISCONTINUED | OUTPATIENT
Start: 2022-01-11 | End: 2022-01-25 | Stop reason: HOSPADM

## 2022-01-11 RX ORDER — MYCOPHENOLATE MOFETIL 250 MG/1
500 CAPSULE ORAL 2 TIMES DAILY
Status: DISCONTINUED | OUTPATIENT
Start: 2022-01-11 | End: 2022-01-16

## 2022-01-11 RX ORDER — DESVENLAFAXINE 50 MG/1
50 TABLET, EXTENDED RELEASE ORAL EVERY EVENING
Status: DISCONTINUED | OUTPATIENT
Start: 2022-01-11 | End: 2022-01-21

## 2022-01-11 RX ORDER — ACETAMINOPHEN 325 MG/1
650 TABLET ORAL EVERY 6 HOURS PRN
Status: DISCONTINUED | OUTPATIENT
Start: 2022-01-11 | End: 2022-01-25 | Stop reason: HOSPADM

## 2022-01-11 RX ORDER — FUROSEMIDE 20 MG/1
TABLET ORAL
Status: ON HOLD | COMMUNITY
Start: 2021-12-22 | End: 2022-03-02 | Stop reason: HOSPADM

## 2022-01-11 RX ORDER — ATORVASTATIN CALCIUM 10 MG/1
10 TABLET, FILM COATED ORAL DAILY
Status: DISCONTINUED | OUTPATIENT
Start: 2022-01-11 | End: 2022-01-25 | Stop reason: HOSPADM

## 2022-01-11 RX ORDER — ALLOPURINOL 100 MG/1
100 TABLET ORAL DAILY
Status: DISCONTINUED | OUTPATIENT
Start: 2022-01-11 | End: 2022-01-25 | Stop reason: HOSPADM

## 2022-01-11 RX ORDER — SODIUM CHLORIDE 9 MG/ML
25 INJECTION, SOLUTION INTRAVENOUS PRN
Status: DISCONTINUED | OUTPATIENT
Start: 2022-01-11 | End: 2022-01-24 | Stop reason: SDUPTHER

## 2022-01-11 RX ORDER — HEPARIN SODIUM (PORCINE) LOCK FLUSH IV SOLN 100 UNIT/ML 100 UNIT/ML
1 SOLUTION INTRAVENOUS EVERY 12 HOURS SCHEDULED
Status: DISCONTINUED | OUTPATIENT
Start: 2022-01-11 | End: 2022-01-24 | Stop reason: SDUPTHER

## 2022-01-11 RX ORDER — LEVOTHYROXINE SODIUM 0.05 MG/1
50 TABLET ORAL DAILY
Status: DISCONTINUED | OUTPATIENT
Start: 2022-01-11 | End: 2022-01-25 | Stop reason: HOSPADM

## 2022-01-11 RX ADMIN — SODIUM CHLORIDE: 9 INJECTION, SOLUTION INTRAVENOUS at 08:54

## 2022-01-11 RX ADMIN — PREDNISONE 5 MG: 5 TABLET ORAL at 22:31

## 2022-01-11 RX ADMIN — ATORVASTATIN CALCIUM 10 MG: 10 TABLET, FILM COATED ORAL at 22:31

## 2022-01-11 RX ADMIN — LIDOCAINE HYDROCHLORIDE 5 ML: 10 INJECTION, SOLUTION EPIDURAL; INFILTRATION; INTRACAUDAL at 16:36

## 2022-01-11 RX ADMIN — Medication 10 ML: at 08:58

## 2022-01-11 RX ADMIN — CLOZAPINE 50 MG: 25 TABLET ORAL at 22:32

## 2022-01-11 RX ADMIN — MYCOPHENOLATE MOFETIL 500 MG: 250 CAPSULE ORAL at 22:31

## 2022-01-11 RX ADMIN — ALLOPURINOL 100 MG: 100 TABLET ORAL at 22:31

## 2022-01-11 RX ADMIN — HEPARIN SODIUM 5000 UNITS: 5000 INJECTION INTRAVENOUS; SUBCUTANEOUS at 08:55

## 2022-01-11 RX ADMIN — LEVOTHYROXINE SODIUM 50 MCG: 0.05 TABLET ORAL at 22:31

## 2022-01-11 RX ADMIN — ACETAMINOPHEN 650 MG: 325 TABLET ORAL at 18:19

## 2022-01-11 RX ADMIN — MYCOPHENOLATE MOFETIL 500 MG: 250 CAPSULE ORAL at 13:47

## 2022-01-11 RX ADMIN — FOLIC ACID 1 MG: 1 TABLET ORAL at 22:31

## 2022-01-11 RX ADMIN — PREDNISONE 5 MG: 5 TABLET ORAL at 13:48

## 2022-01-11 RX ADMIN — ONDANSETRON 4 MG: 4 TABLET, ORALLY DISINTEGRATING ORAL at 18:06

## 2022-01-11 RX ADMIN — SODIUM CHLORIDE 500 ML: 9 INJECTION, SOLUTION INTRAVENOUS at 00:57

## 2022-01-11 RX ADMIN — VANCOMYCIN HYDROCHLORIDE 1000 MG: 1 INJECTION, POWDER, LYOPHILIZED, FOR SOLUTION INTRAVENOUS at 17:15

## 2022-01-11 RX ADMIN — CYCLOSPORINE 100 MG: 25 CAPSULE, GELATIN COATED ORAL at 17:14

## 2022-01-11 RX ADMIN — METOPROLOL SUCCINATE 50 MG: 50 TABLET, EXTENDED RELEASE ORAL at 22:31

## 2022-01-11 RX ADMIN — DOCUSATE SODIUM 100 MG: 100 CAPSULE ORAL at 22:30

## 2022-01-11 RX ADMIN — DESVENLAFAXINE SUCCINATE 50 MG: 50 TABLET, FILM COATED, EXTENDED RELEASE ORAL at 22:31

## 2022-01-11 RX ADMIN — HEPARIN SODIUM 5000 UNITS: 5000 INJECTION INTRAVENOUS; SUBCUTANEOUS at 22:31

## 2022-01-11 RX ADMIN — CEFEPIME HYDROCHLORIDE 2000 MG: 2 INJECTION, POWDER, FOR SOLUTION INTRAVENOUS at 18:27

## 2022-01-11 ASSESSMENT — ENCOUNTER SYMPTOMS
CHEST TIGHTNESS: 0
PHOTOPHOBIA: 0
DIARRHEA: 0
ABDOMINAL DISTENTION: 0
NAUSEA: 1
SHORTNESS OF BREATH: 0
ABDOMINAL PAIN: 0
COUGH: 0
VOMITING: 0

## 2022-01-11 ASSESSMENT — PAIN SCALES - GENERAL
PAINLEVEL_OUTOF10: 0
PAINLEVEL_OUTOF10: 2

## 2022-01-11 NOTE — PROGRESS NOTES
[address] [facility]  [facility] [address] [facility] [address]         Date:2022                                                   Patient Name: Dax Charles     MRN: [id number]     : 1955     Room: [address] [place] [address]       Evaluating OT: Gab Biswas, OTR/L; [id number]       Referring Provider and Orders/Date:  OT eval and treat Start: 22, End: 22, ONE TIME, Standing Count: 1 Occurrences, Marisol Ramos MD         Diagnosis:   1.  CECILY (acute kidney injury) [facility])           Pertinent Medical History:        Past Medical History:   Diagnosis Date    Abnormal EKG     left bundle branch block    Acute gout involving toe of right foot 9/3/2020    Acute gout involving toe of right foot 9/3/2020    Acute on chronic combined systolic (congestive) and diastolic (congestive) heart failure (HCC)     Cancer (HCC)     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    Cerebrovascular disease     Clotting disorder ([address] 1985    thrombophlebitis after c section delivery    Depression     15 years followed by dr Teri Martines Hyperlipidemia     Hypertension     Hyperthyroidism     Leg swelling 9/3/2020    Lymphedema of both lower extremities 9/3/2020    Peripheral vascular disease ([address]     Renal failure 2012    renal transplant    Thrombophlebitis     after c section delivery    Thyroid disease           Past Surgical History:   Procedure Laterality Date   [address]    one normal delivery    COLONOSCOPY      DIAGNOSTIC CARDIAC CATH LAB PROCEDURE      normal coronaries and  normal coronaries    DIALYSIS FISTULA CREATION  march and 2012    phase one and two patient will start dialysis when needed   [address]    transfemoral venous bypass grafts    KIDNEY TRANSPLANT  2016   [facility]  2015   [facility]  2015    PARATHYROIDECTOMY  2006 left parathyroid  implant and parathyroidectomy       Precautions:  Fall Risk, hassan    Recommended placement: subacute    Assessment of current deficits     [x] Functional mobility  [x]ADLs  [x] Strength               []Cognition     [x] Functional transfers   [x] IADLs         [x] Safety Awareness   [x]Endurance     [] Fine Coordination              [x] Balance      [] Vision/perception   []Sensation      [x]Gross Motor Coordination  [] ROM  [] Delirium                   [] Motor Control     OT PLAN OF CARE   OT POC based on physician orders, patient diagnosis and results of clinical assessment    Frequency/Duration 1-3 days/wk for 2 weeks PRN   Specific OT Treatment Interventions to include:   * Instruction/training on adapted ADL techniques and AE recommendations to increase functional independence within precautions       * Training on energy conservation strategies, correct breathing pattern and techniques to improve independence/tolerance for self-care routine  * Functional transfer/mobility training/DME recommendations for increased independence, safety, and fall prevention  * Patient/Family education to increase follow through with safety techniques and functional independence  * Recommendation of environmental modifications for increased safety with functional transfers/mobility and ADLs  * Therapeutic exercise to improve motor endurance, ROM, and functional strength for ADLs/functional transfers  * Therapeutic activities to facilitate/challenge dynamic balance, stand tolerance for increased safety and independence with ADLs  * Therapeutic activities to facilitate gross/fine motor skills for increased independence with ADLs  * Neuro-muscular re-education: facilitation of righting/equilibrium reactions, midline orientation, scapular stability/mobility, normalization of muscle tone, and facilitation of volitional active controled movement  * Positioning to improve skin integrity, interaction with environment and functional independence    Recommended Adaptive Equipment/DME:  TBD       Home Living: Pt lives alone in a apartment 9th, elevator access  with No steps  to enter. Laundry on the 2nd floor with elevator access. Pt cares for mother as well that lives in her own home, but there is a lift to enter. Reports that she has family and friends to assist if needed. Bathroom setup: tub shower with grab bars, shower chair, standard toilet    DME owned: rollator      Prior Level of Function: indep with ADLs , indep with IADLs; ambulated indep    Driving: yes   Occupation: retired   Enjoys: [employer], casino with mother     Pain Level: none    Cognition: A&O: 4/4; Follows 3 step directions with overall fatigue and decreased motivation this AM   Memory:  fair   Sequencing:  Fair   Problem solving:  Fair-   Judgement/safety:  fair    AM-PAC Daily Activity Inpatient   How much help for putting on and taking off regular lower body clothing?: Total  How much help for Bathing?: A Lot  How much help for Toileting?: Total  How much help for putting on and taking off regular upper body clothing?: Total  How much help for taking care of personal grooming?: A Lot  How much help for eating meals?: A Little  AM-[facility] Inpatient Daily Activity Raw Score: 10  AM-PAC Inpatient ADL T-Scale Score : 27.31  ADL Inpatient CMS 0-100% Score: 74.7  ADL Inpatient CMS G-Code Modifier : CL       Functional Assessment:     Initial Eval Status  Date: 1/11/2022   Treatment Status  Date: STGs = LTGs  Time frame: 10-14 days   Feeding Minimal Assist with weakness and overall fatigue. Hand over hand to prevent spillage. indep   Grooming Moderate Assist with pt falling a sleep from sitting up right in bed and reporting poor motivation for hair care, face wash and declining oral care. Supervision    UB Dressing Dependent from sitting EOB with LOB when removing arms from support.    Minimal Assist    LB Dressing Dependent for dinah socks from supine  Moderate Assist    Bathing Maximal Assist from supine level with pt able to complete chest and R UE. Minimal Assist    Toileting Dependent with hassan management  Moderate Assist    Bed Mobility  Supine to sit: Maximal Assist   Sit to supine: Moderate Assist  With fatigue and difficultly to remain awake. Mod A to maintain sitting EOB, falling to the right side requesting to lay on pillow. Once in supine, dep to boost in bed with taps system with pt falling a sleep quickly. Rolling at min A for comfort to side lying  Supine to sit: Minimal Assist   Sit to supine: Minimal Assist    Functional Transfers Maximal Assist for sit to stand from elevated surface of bed. Poor motivation from pt and she was unable to maintain for extended period of time. Unsafe to attempt stand pivot this AM  Minimal Assist    Functional Mobility  NT due to pt overall debility, decreased activity tolerance, balance deficits, safety and fall risk. Minimal Assist    Balance Sitting:     Static:  Poor+    Dynamic:Poor  Standing: poor  Sitting:     Static:  fair    Dynamic:fair  Standing: fair-   Activity Tolerance Vitals with activity: room air with 94% HR 85  Sitting EOB for 5min average with fall risk and fatigue. Standing tolerance for <20sec with weakness  Increase standingtolerance for >2min with stable vital signs for carry over into toileting, functional tranfers and indep in ADLs   Visual/  Perceptual Glasses: reading-Present; WFL    Reports change in vision since admission: No     NA   Yair UE Strengthening  3-/5 generally  4+/5MMT generally for carry over into self care, functional transfers and functional mobility with AD.       Hand Dominance  [x] Right  [] Left    AROM (PROM) Strength Additional Info:    RUE  WFL with weakness in yair shoulders and fatigue with testing 3-/5 Fair  and fair- FMC/dexterity noted during ADL tasks  Opposition [x] Intact [] Impaired  Finger to nose [] Intact [x] Impaired     LUE WFL with weakness in dinah shoulders and fatigue with testing 3/5 Fair  and fair- FMC/dexterity noted during ADL tasks  Opposition [x] Intact [] Impaired  Finger to nose [x] Intact [] Impaired     Hearing: WFL   Sensation:  No c/o numbness or tingling   Tone: WFL   Edema: dinah LE    Comments: Upon arrival patient supine. Pt required dep A for most UB ADLs and depA LB ADLs tasks. Limited with max A for standing. The biggest barriers reflect that of functional transfers, functional mobility, UB/LB ADLs, cognition, activity tolerance, balance, safety and strengthening. At end of session, patient side lying with call light and phone within reach, all lines and tubes intact. Overall patient demonstrated decreased independence and safety during completion of ADL/functional transfer/mobility tasks compared to PLOF. Nursing updated on pt position and status following OT eval. Pt would benefit from continued skilled OT to increase safety and independence with completion of ADL/IADL tasks for functional independence and quality of life. Treatment: OT treatment provided this date includes:   Instruction, education and training on safe facilitation and adapted techniques for completion of ADLs. These include  neuromuscular reeducation to facilitate balance/righting reactions, safe functional transfer techniques and on energy conservation/work simplification for completion of ADLs. Education provided on hand/feet placement with bed rails and body mechanics for fall prevention. Cues for energy conservation and safety for in the home at [place], including modifications and DME. Extended time to complete all tasks, including skilled monitoring of patient's response during treatment session and vital signs. Prior to and at the end of session, environmental modifications / line management completed for patients safety and efficiency of treatment session. See above for further details.     Rehab Potential: Good for established goals

## 2022-01-11 NOTE — PLAN OF CARE
Problem: Skin Integrity:  Goal: Will show no infection signs and symptoms  Description: Will show no infection signs and symptoms  1/11/2022 1839 by Tory Ames  Outcome: Met This Shift  1/11/2022 0637 by Sussy Mcclendon RN  Outcome: Met This Shift  Goal: Absence of new skin breakdown  Description: Absence of new skin breakdown  1/11/2022 1839 by Tory Ames  Outcome: Met This Shift  1/11/2022 0637 by Sussy Mcclendon RN  Outcome: Met This Shift     Problem: Falls - Risk of:  Goal: Will remain free from falls  Description: Will remain free from falls  1/11/2022 1839 by Tory Ames  Outcome: Met This Shift  1/11/2022 0637 by Sussy Mcclendon RN  Outcome: Met This Shift  Goal: Absence of physical injury  Description: Absence of physical injury  1/11/2022 1839 by Tory Ames  Outcome: Met This Shift  1/11/2022 0637 by Sussy Mcclendon RN  Outcome: Met This Shift

## 2022-01-11 NOTE — FLOWSHEET NOTE
01/11/22 1326   Provider Notification   Reason for Communication Evaluate  (PICC insert)   Provider Name PICC line RN   Provider Notification Other (Comment)  (PICC line RN)   Method of Communication Call   Response Waiting for response  (msg to call about midline )

## 2022-01-11 NOTE — PROGRESS NOTES
Physical Therapy    Physical Therapy Initial Evaluation/Plan of Care    Room #:  3457/4773-71  Patient Name: Koko Valenzuela  YOB: 1955  MRN: [id number]    Date of Service: 1/11/2022     Tentative placement recommendation: Subacute vs Home Health Physical Therapy if patient meets goals  Equipment recommendation: [address]      Evaluating Physical Therapist: Dru Lujan, PT  #[id number]      Specific Provider Orders/Date/Referring Provider :  01/11/22 0730   PT eval and treat Start: 01/11/22 0730, End: 01/11/22 0730, ONE TIME, Standing Count: 1 Occurrences, Daija Flaherty MD      Admitting Diagnosis:   CECILY (acute kidney injury) ([address] [N17.9]     Admitted with  Abnormal labs, fatigue , nausea , vomiting   Surgery: none      Patient Active Problem List   Diagnosis    Peripheral vascular disease ([address]    Depression    Clotting disorder ([address]    Abnormal EKG    Cancer ([address]    Over weight    S/p cadaver renal transplant    ESRD (end stage renal disease) ([address]    Non morbid obesity due to excess calories    Hypertension    Leg swelling    Lymphedema of both lower extremities    Acute gout involving toe of right foot    Sepsis secondary to UTI ([address]    Acute kidney injury superimposed on CKD ([address]    Thyroid disease    Acute on chronic combined systolic (congestive) and diastolic (congestive) heart failure ([address]    Sepsis ([address]    Acute renal failure ([address]    CECILY (acute kidney injury) ([address]        ASSESSMENT of Current Deficits Patient exhibits decreased strength, balance, endurance and coordination impairing functional mobility, transfers, gait , gait distance and tolerance to activity are barriers to d/c and require skilled intervention during hospital stay to attain pre hospital level of function. Decreased strength, balance and endurance  increases patient's risk for fall.          PHYSICAL THERAPY  PLAN OF CARE       Physical therapy plan of care is established based on physician order,  patient diagnosis and clinical assessment    Current Treatment Recommendations:    -Bed Mobility: Lower extremity exercises  and Trunk control activities   -Standing Balance: Perform strengthening exercises in standing to promote motor control with or without upper extremity support  and Instruct patient on adequate base of support to maintain balance  -Transfers: Provide instruction on proper hand and foot position for adequate transfer of weight onto lower extremities and use of gait device if needed and Cues for hand placement, technique and safety. Provide stabilization to prevent fall   -Gait: Gait training, Standing activities to improve: base of support, weight shift, weight bearing  and Exercises to improve hip and knee control   -Endurance: Utilize Supervised activities to increase level of endurance to allow for safe functional mobility including transfers and gait     PT long term treatment goals are located in below grid    Patient and or family understand(s) diagnosis, prognosis, and plan of care. Frequency of treatments: Patient will be seen  3-5 times/week.          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    Cerebrovascular disease     Clotting disorder ([address] 1985    thrombophlebitis after c section delivery    Depression     15 years followed by dr Perri Marley    Hyperlipidemia     Hypertension     Hyperthyroidism     Leg swelling 9/3/2020    Lymphedema of both lower extremities 9/3/2020    Peripheral vascular disease ([address]     Renal failure 2012    renal transplant    Thrombophlebitis     after c section delivery    Thyroid disease      Past Surgical History:   Procedure Laterality Date     SECTION  1985    one normal delivery    COLONOSCOPY      DIAGNOSTIC CARDIAC CATH LAB PROCEDURE      normal coronaries and  normal coronaries    DIALYSIS FISTULA CREATION  march and 2012    phase one and two patient will start dialysis when needed   [address]    transfemoral venous bypass grafts    KIDNEY TRANSPLANT  2016   [facility]  2015   [facility]  2015    PARATHYROIDECTOMY  2006    left parathyroid  implant and parathyroidectomy       SUBJECTIVE:    Precautions: Up with assistance, falls and alarm ,    Social history: Patient lives alone in a apartment    with Elevator  to enter     Tub shower grab bars    Equipment owned: Rollator and Shower chair,       [facility]   How much difficulty turning over in bed?: A Little  How much difficulty sitting down on / standing up from a chair with arms?: A Little  How much difficulty moving from lying on back to sitting on side of bed?: A Lot  How much help from another person moving to and from a bed to a chair?: A Little  How much help from another person needed to walk in hospital room?: A Lot  How much help from another person for climbing 3-5 steps with a railing?: A Lot  AM-PAC Inpatient Mobility Raw Score : 15  AM-PAC Inpatient T-Scale Score : 39.45  Mobility Inpatient CMS 0-100% Score: 57.7  Mobility Inpatient CMS G-Code Modifier : CK    Nursing cleared patient for PT evaluation. The admitting diagnosis and active problem list as listed above have been reviewed prior to the initiation of this evaluation. OBJECTIVE;   Initial Evaluation  Date: 2022 Treatment Date:     Short Term/ Long Term   Goals   Was pt agreeable to Eval/treatment?  Yes  To be met in 3 days   Pain level   0/10        Bed Mobility    Rolling: Minimal assist of 1    Supine to sit: Moderate assist of 1    Sit to supine: Moderate assist of 1    Scooting: Minimal assist of 1    Rolling: Independent    Supine to sit: Independent    Sit to supine: Independent    Scooting: Independent     Transfers Sit to stand: Minimal assist of 1    Sit to stand: Independent     Ambulation    35 feet using  wheeled walker with Moderate assist of 1   for one instance of knee buckle otherwise min a  Walker approximation and safety   100 feet using  least restrictive device versus no device with Independent    ROM Within functional limits        Strength BUE:  refer to OT eval  RLE:  3+/5  LLE:  3+/5  Increase strength in affected mm groups by 1/3 grade   Balance Sitting EOB:  fair    Dynamic Standing:  fair with wheeled walker   Sitting EOB:  good    Dynamic Standing: good with wheeled walker      Patient is Alert & Oriented x person, place, time and situation and follows directions    Sensation:  Patient  denies numbness/tingling   Edema:  no   Endurance: fair          Patient education  Patient educated on role of Physical Therapy, risks of immobility, safety and plan of care,  importance of mobility while in hospital  and importance and purpose of adaptive device and adjusted to proper height for the patient. Patient response to education:   Pt verbalized understanding Pt demonstrated skill Pt requires further education in this area   Yes Partial Yes      Treatment:  Patient practiced and was instructed/facilitated in the following treatment: Patient   Sat edge of bed 5 minutes with Supervision  to increase dynamic sitting balance and activity tolerance. seated and standing challenges      Therapeutic Exercises:  not performed       At end of session, patient in chair with  call light and phone within reach,  all lines and tubes intact, nursing notified.       Patient would benefit from continued skilled Physical Therapy to improve functional independence and quality of life. Patient's/ family goals   home    Time in  26  Time out  1016    Total Treatment Time  0 minutes    Evaluation time includes thorough review of current medical information, gathering information on past medical history/social history and prior level of function, completion of standardized testing/informal observation of tasks, assessment of data, and development of Plan of care and goals.      CPT codes:  Low Complexity PT evaluation (41519)  No treatment    Byron Khalil, PT

## 2022-01-11 NOTE — CONSULTS
loss  ALLERGIES:    No urticaria, hay fever,    EYES:     No blurry vision, loss of vision, eye pain  ENT:      No hearing loss, sore throat  CARDIOVASCULAR:  No chest pain or palpitations  RESPIRATORY:   No cough, sob  ENDOCRINE:    No increase thirst, urination   HEME-LYMPH:   No easy bruising or bleeding  GI:     No nausea, vomiting or diarrhea  :     No urinary complaints  NEURO:    No seizures, stroke, HA  MUSCULOSKELETAL:  No muscle aches or pain, no joint pain  SKIN:     No rash or itch  PSYCH:    No depression or anxiety    Medications Prior to Admission: furosemide (LASIX) 20 MG tablet,   lisinopril (PRINIVIL;ZESTRIL) 5 MG tablet, take 1 tablet by mouth once daily  pregabalin (LYRICA) 75 MG capsule,   nystatin (MYCOSTATIN) 291289 UNIT/GM powder, Apply 3 times daily.   ipratropium-albuterol (DUONEB) 0.5-2.5 (3) MG/3ML SOLN nebulizer solution, Inhale 3 mLs into the lungs every 6 hours as needed for Shortness of Breath  budesonide (PULMICORT) 0.5 MG/2ML nebulizer suspension, Take 2 mLs by nebulization 2 times daily for 10 days  ondansetron (ZOFRAN-ODT) 4 MG disintegrating tablet, Take 1 tablet by mouth every 8 hours as needed for Nausea or Vomiting  allopurinol (ZYLOPRIM) 100 MG tablet, Take 100 mg by mouth daily  Cyanocobalamin (B-12 COMPLIANCE INJECTION) 1000 MCG/ML KIT, Inject as directed monthly  metoprolol succinate (TOPROL XL) 50 MG extended release tablet, take 1 tablet by mouth once daily  desvenlafaxine succinate (PRISTIQ) 50 MG TB24 extended release tablet, Take 1 tablet by mouth every evening  cloZAPine (CLOZARIL) 50 MG tablet, Take 50 mg by mouth nightly  cycloSPORINE (SANDIMMUNE) 100 MG capsule, Take 100 mg by mouth 2 times daily  predniSONE (DELTASONE) 5 MG tablet, Take 5 mg by mouth daily  folic acid (FOLVITE) 1 MG tablet, Take 1 mg by mouth daily  docusate sodium (COLACE) 100 MG capsule, Take 100 mg by mouth 2 times daily  mycophenolate (CELLCEPT) 500 MG tablet, Take 1,000 mg by mouth 2 times daily  simvastatin (ZOCOR) 20 MG tablet, Take 20 mg by mouth nightly. levothyroxine (SYNTHROID) 50 MCG tablet, Take 50 mcg by mouth daily.     CURRENT MEDICATIONS     Current Facility-Administered Medications:     0.9 % sodium chloride infusion, , IntraVENous, Continuous, Ana Reyna MD, Last Rate: 75 mL/hr at 01/11/22 0854, New Bag at 01/11/22 0854    heparin (porcine) injection 5,000 Units, 5,000 Units, SubCUTAneous, BID, Ana Reyna MD, 5,000 Units at 01/11/22 0855    sodium chloride flush 0.9 % injection 5-40 mL, 5-40 mL, IntraVENous, 2 times per day, Ana Reyna MD, 10 mL at 01/11/22 0858    sodium chloride flush 0.9 % injection 5-40 mL, 5-40 mL, IntraVENous, PRN, Barbi Ramirez MD    0.9 % sodium chloride infusion, 25 mL, IntraVENous, PRN, Barbi Ramirez MD    ondansetron (ZOFRAN-ODT) disintegrating tablet 4 mg, 4 mg, Oral, Q8H PRN **OR** ondansetron (ZOFRAN) injection 4 mg, 4 mg, IntraVENous, Q6H PRN, Barbi Ramirez MD    polyethylene glycol (GLYCOLAX) packet 17 g, 17 g, Oral, Daily PRN, Ana Reyna MD    acetaminophen (TYLENOL) tablet 650 mg, 650 mg, Oral, Q6H PRN **OR** acetaminophen (TYLENOL) suppository 650 mg, 650 mg, Rectal, Q6H PRN, Barbi Ramirez MD    cycloSPORINE (SANDIMMUNE) capsule 100 mg, 100 mg, Oral, BID, Coty Marrero MD    predniSONE (DELTASONE) tablet 5 mg, 5 mg, Oral, Daily, Coty Marrero MD, 5 mg at 01/11/22 1348    mycophenolate (CELLCEPT) capsule 500 mg, 500 mg, Oral, BID, Coty Marrero MD, 500 mg at 01/11/22 1347    lidocaine 1 % injection 5 mL, 5 mL, IntraDERmal, Once, Ana Reyna MD    sodium chloride flush 0.9 % injection 5-40 mL, 5-40 mL, IntraVENous, 2 times per day, Ana Reyna MD    sodium chloride flush 0.9 % injection 5-40 mL, 5-40 mL, IntraVENous, PRN, Barbi Ramirez MD    0.9 % sodium chloride infusion, 25 mL, IntraVENous, PRN, Barbi Ramirez MD    heparin flush 100 UNIT/ML injection 100 Units, 1 mL, IntraVENous, 2 times per day, Susannah Radha Santos MD    heparin flush 100 UNIT/ML injection 100 Units, 1 mL, IntraCATHeter, PRN, Marysol Ramirez MD    vancomycin (VANCOCIN) 1,000 mg in dextrose 5 % 250 mL IVPB, 1,000 mg, IntraVENous, Once, Quita Gottron, MD    cefepime (MAXIPIME) 2000 mg IVPB minibag, 2,000 mg, IntraVENous, Q24H, Quita Gottron, MD    ondansetron (ZOFRAN-ODT) disintegrating tablet 4 mg, 4 mg, Oral, Once, Fredrick Velasquez MD  ALLERGIES     Macrodantin [nitrofurantoin macrocrystal] and Sulfa antibiotics  Immunization History   Administered Date(s) Administered    COVID-19, Nutricate, PF, 30mcg/0.3mL 01/28/2021, 02/18/2021, 08/18/2021      Internal Administration   First Dose COVID-19, Pfizer, PF, 30mcg/0.3mL  01/28/2021   Second Dose COVID-19, Pfizer, PF, 30mcg/0.3mL   02/18/2021       Last COVID Lab SARS-CoV-2 (no units)   Date Value   01/20/2021 Not Detected     SARS-CoV-2, PCR (no units)   Date Value   11/17/2021 Not Detected     SARS-CoV-2, NAAT (no units)   Date Value   01/10/2022 Not Detected            PAST MEDICAL HISTORY     Past Medical History:   Diagnosis Date    Abnormal EKG     left bundle branch block    Acute gout involving toe of right foot 9/3/2020    Acute gout involving toe of right foot 9/3/2020    Acute on chronic combined systolic (congestive) and diastolic (congestive) heart failure ([address]     Cancer ([address]     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    Cerebrovascular disease     Clotting disorder ([address] 1985    thrombophlebitis after c section delivery    Depression     15 years followed by dr Jeromy Hubbard Hyperlipidemia     Hypertension     Hyperthyroidism     Leg swelling 9/3/2020    Lymphedema of both lower extremities 9/3/2020    Peripheral vascular disease ([address]     Renal failure 11/2012    renal transplant    Thrombophlebitis     after c section delivery    Thyroid disease      SURGICAL HISTORY       Past Surgical History:   Procedure Laterality Date   [address] [place] [address] one normal delivery    COLONOSCOPY      DIAGNOSTIC CARDIAC CATH LAB PROCEDURE  2006    normal coronaries and 1996 normal coronaries    DIALYSIS FISTULA CREATION  march and 2012    phase one and two patient will start dialysis when needed   [address]    transfemoral venous bypass grafts    KIDNEY TRANSPLANT  2016    KIDNEY TRANSPLANT  2015    KIDNEY TRANSPLANT  2015    PARATHYROIDECTOMY  2006    left parathyroid  implant and parathyroidectomy     FAMILY HISTORY       Family History   Problem Relation Age of Onset    Diabetes Mother     Diabetes Father     Dementia Father      SOCIAL HISTORY       Social History     Socioeconomic History    Marital status:      Spouse name: None    Number of children: None    Years of education: None    Highest education level: None   Occupational History    None   Tobacco Use    Smoking status: Former Smoker     Packs/day: 1.00     Years: 20.00     Pack years: 20.00     Quit date: 10/1/2015     Years since quittin.2    Smokeless tobacco: Never Used   Vaping Use    Vaping Use: Never used   Substance and Sexual Activity    Alcohol use: No     Comment: 2 cups coffee per day    Drug use: No    Sexual activity: None   Other Topics Concern    None   Social History Narrative    None     Social Determinants of Health     Financial Resource Strain:     Difficulty of Paying Living Expenses: Not on file   Food Insecurity:     Worried About Running Out of Food in the Last Year: Not on file    Vann[place] of Food in the Last Year: Not on file   Transportation Needs:     Lack of Transportation (Medical): Not on file    Lack of Transportation (Non-Medical):  Not on file   Physical Activity:     Days of Exercise per Week: Not on file    Minutes of Exercise per Session: Not on file   Stress:     Feeling of Stress : Not on file   Social Connections:     Frequency of Communication with Friends and Family: Not on file    Frequency of Social Gatherings with Friends and Family: Not on file    Attends Restorationist Services: Not on file    Active Member of Clubs or Organizations: Not on file    Attends Club or Organization Meetings: Not on file    Marital Status: Not on file   Intimate Partner Violence:     Fear of Current or Ex-Partner: Not on file    Emotionally Abused: Not on file    Physically Abused: Not on file    Sexually Abused: Not on file   Housing Stability:     Unable to Pay for Housing in the Last Year: Not on file    Number of [place] in the Last Year: Not on file    Unstable Housing in the Last Year: Not on file     ·      [address]       ED Triage Vitals   BP Temp Temp Source Pulse Resp SpO2 Height Weight   01/10/22 1829 01/10/22 1829 01/11/22 0556 01/10/22 1829 01/10/22 1829 01/10/22 1829 01/10/22 1829 01/10/22 1829   (!) 156/73 99.4 °F (37.4 °C) Temporal 82 16 97 % 5' 2\" (1.575 m) 200 lb (90.7 kg)     Vitals:    Vitals:    01/11/22 0343 01/11/22 0547 01/11/22 0556 01/11/22 0825   BP: (!) 148/75  (!) 152/74 136/60   Pulse: 87  91 87   Resp: 14  25 19   Temp:   99.3 °F (37.4 °C) 98.9 °F (37.2 °C)   TempSrc:   Temporal Axillary   SpO2: 95%  93% 95%   Weight:  218 lb 12.8 oz (99.2 kg)     Height:  5' 2\" (1.575 m)       Physical Exam   Constitutional/General: Alert and oriented, NAD IN BED  Head: NC/AT  Eyes: PERRL, EOMI  Mouth: Normal mucosa, no thrush   Neck: Supple, full ROM,    Pulmonary: Lungs  DE   to auscultation bilaterally. Not in respiratory distress  Cardiovascular:  Regular rate and rhythm, no murmurs, gallops, or rubs. Abdomen: Soft, + BS. No distension. Nontender. Extremities: Moves all extremities x 4.    Warm and well perfused  Pulses:  Distal pulses intact  Skin: Warm and dry without rash  Neurologic:    No focal deficits  Psych: Normal Affect  RIGHT MIDLINE  MERCHANT      DIAGNOSTIC RESULTS   RADIOLOGY:   CT ABDOMEN PELVIS WO CONTRAST Additional Contrast? None    Result Date: 1/10/2022  EXAMINATION: CT OF THE ABDOMEN AND PELVIS WITHOUT CONTRAST 1/10/2022 6:56 pm TECHNIQUE: CT of the abdomen and pelvis was performed without the administration of intravenous contrast. Multiplanar reformatted images are provided for review. Dose modulation, iterative reconstruction, and/or weight based adjustment of the mA/kV was utilized to reduce the radiation dose to as low as reasonably achievable. COMPARISON: CT abdomen pelvis 11/17/2021 HISTORY: ORDERING SYSTEM PROVIDED HISTORY: evaluate for diarrhea TECHNOLOGIST PROVIDED HISTORY: Reason for exam:->evaluate for diarrhea Additional Contrast?->None Decision Support Exception - unselect if not a suspected or confirmed emergency medical condition->Emergency Medical Condition (MA) FINDINGS: Lower Chest: Atherosclerotic disease. Atrophic kidneys with multiple cysts. Some of the presumed cysts appear hyperdense. Subcentimeter foci in the kidneys are too small to accurately characterize. There is a transplant kidney in the right lower quadrant. There is no hydronephrosis of the transplant kidney. Decreased perinephric stranding above the transplant kidney. Unremarkable bladder. Unremarkable appearance of the reproductive organs. No abdominal aortic aneurysm. Atherosclerotic disease. No evidence of cholecystitis or pancreatitis. No free air or significant free fluid. No evidence of bowel obstruction. Nondilated appendix. No significant fluid within the large bowel. Small fat containing umbilical hernia. Mild degenerative changes of the spine. Presumed surgical scars in the anterior abdominal wall. No significant fluid within the large bowel. No hydronephrosis of the transplant kidneys. The native kidneys are atrophic and have multiple intermediate density cysts which should be further evaluated with nonemergent ultrasound. Atherosclerotic disease. XR CHEST PORTABLE    Result Date: 1/11/2022  EXAMINATION: ONE XRAY VIEW OF THE CHEST 1/11/2022 9:11 am COMPARISON: None. HISTORY: ORDERING SYSTEM PROVIDED HISTORY: shortness of breath TECHNOLOGIST PROVIDED HISTORY: Reason for exam:->shortness of breath FINDINGS: The heart is enlarged. There are no findings of failure. The lungs are clear. There is no focal infiltrate or effusion. 1. Stable cardiomegaly. There is no evidence of failure or pneumonia. LABS  Recent Labs     01/10/22  2130   WBC 17.9*   HGB 11.2*   HCT 35.9   .5*        Recent Labs     01/10/22  2130 01/10/22  2130 01/11/22  1417     --  138   K 5.5*  --  4.2      < > 105   CO2 21*   < > 22   BUN 32*  --  28*   CREATININE 2.5*  --  2.0*   GFRAA 23  --  30   LABGLOM 19  --  25   GLUCOSE 83  --  129*   PROT 6.1*  --  6.1*   LABALBU 2.8*  --  3.0*   CALCIUM 9.8  --  9.7   BILITOT 0.8  --  1.0   ALKPHOS 109*  --  138*   AST 70*  --  39*   ALT 33*  --  26    < > = values in this interval not displayed. No results for input(s): PROCAL in the last 72 hours. Lab Results   Component Value Date    CRP 34.6 (H) 11/17/2021     Lab Results   Component Value Date    SEDRATE 62 (H) 11/20/2021     No results found for: [id number]  Lab Results   Component Value Date    COVID19 Not Detected 01/10/2022    COVID19 Not Detected 11/17/2021     COVID-19/MARINA-COV2 LABS  Recent Labs     01/10/22  2130 01/11/22  1417   AST 70* 39*   ALT 33* 26           MICROBIOLOGY:          Lab Results   Component Value Date    BC 5 Days no growth 11/17/2021    BC  12/17/2019     Refer to previous culture of CXBLD from 12-17-19 @1120 for  susceptibility results      ORG Klebsiella pneumoniae ssp pneumoniae 11/17/2021    ORG Staphylococcus coagulase-negative 11/17/2021    ORG Klebsiella pneumoniae ssp pneumoniae 11/17/2021     Lab Results   Component Value Date    BLOODCULT2  11/17/2021     This organism was isolated in one set. Susceptibility testing is not routinely done as this  organism frequently represents skin contamination.   Additional testing can be ordered by calling the  Microbiology Department. ORG Klebsiella pneumoniae ssp pneumoniae 11/17/2021    ORG Staphylococcus coagulase-negative 11/17/2021    ORG Klebsiella pneumoniae ssp pneumoniae 11/17/2021        Urine Culture, Routine   Date Value Ref Range Status   11/17/2021 <10,000 CFU/mL  Mixed gram positive organisms   (A)  Final   11/17/2021 >100,000 CFU/ml  Final   04/09/2021   Final    10 to 100,000 CFU/mL  Mixed rubio isolated. Further workup and sensitivity testing  is not routinely indicated and will not be performed. Mixed rubio isolated includes:  Escherichia coli  Mixed gram positive organisms  Alpha hemolytic Strep species  Yeast  Nonhemolytic Strep species       MRSA Culture Only   Date Value Ref Range Status   11/17/2021 Methicillin resistant Staph aureus not isolated  Final          FINAL IMPRESSION    Patient is a 73 y.o. female who presented with   Chief Complaint   Patient presents with    Fatigue     patient attempted to go to pcp today and didnt have the strength. came here instead. patient states sepsis prior to thanksgiving requiring admission and rehab.    and admitted for CECILY (acute kidney injury) ([facility] [N17.9]   IMMUNOCOMPROMISED PT RENAL TRANSPLANT  LEUKOCYTOSIS  CECILY      EVAL FOR SEPSIS/VIRAL SYNDROME  CHECK CX  COVID PCR  ATBX  RENAL TO SEE    S/P RX KLEBSIELLA/CONS BACTEREMIA  S/P RX E COLI UTI        cycloSPORINE (SANDIMMUNE) capsule 100 mg, BID  predniSONE (DELTASONE) tablet 5 mg, Daily  mycophenolate (CELLCEPT) capsule 500 mg, BID     vancomycin (VANCOCIN) 1,000 mg in dextrose 5 % 250 mL IVPB, Once  cefepime (MAXIPIME) 2000 mg IVPB minibag, Q24H       Cont atbx infectious work up       Imaging and labs were reviewed per medical records and any ID pertinent labs were addressed with the patient. The patient/FAMILY  was educated about the diagnosis, prognosis, indications, risks and benefits of treatment.       An opportunity to ask questions was given to the patient/FAMILY and questions were answered. Thank you for involving me in the care of [employer]. Please do not hesitate to call for any questions or concerns.          Electronically signed by Abbi Henderson MD on 1/11/2022 at 4:05 PM

## 2022-01-11 NOTE — PROGRESS NOTES
ABG drawn x 1 from Right Radial. Patient had Normal Leandro's Test.  Patient was on room air  at time of puncture. Pressure held for 5. No bleeding or bruising noted at puncture site. Patient tolerated procedure well.       Performed by Christin Galaviz RCP

## 2022-01-11 NOTE — PROCEDURES
SL power midline Placement 1/11/2022    Product number: [facility] [id number]   Lot Number: [id number]   Consult: limited access   Ultrasound: yes   Right Basilic vein:                Upper Arm Circumference: 40cm    Size: 4.5fr    Exposed Length: 0     Internal Length: 15cm   Cut: 15cm   Vein Measurement: 0.50cm    Tolerated well  Brisk blood return  Flushed well and capped  RN notified    Josh Mckeon RN  1/11/2022  3:20 PM

## 2022-01-11 NOTE — FLOWSHEET NOTE
01/11/22 1525   Provider Notification   Reason for Communication Evaluate  (requesting for labs for the day to be drawn )   Provider Name Dr Molly Quezada   Provider Notification Other (Comment)  ()   Method of Communication Call   Response Other (Comment)  (lab stated were drawn at [phone] & waiting to receive)     [id number] [phone] RN aware of above & to follow up on labs

## 2022-01-11 NOTE — PROGRESS NOTES
Spoke to Dr. Bernard Gill regarding patient's recent set of vitals. Dr. Bernard Gill advised to give Tylenol for temperature and call back if it is unrelieved.

## 2022-01-12 LAB
ALBUMIN SERPL-MCNC: 2.9 G/DL (ref 3.5–5.2)
ALP BLD-CCNC: 111 U/L (ref 35–104)
ALT SERPL-CCNC: 25 U/L (ref 0–32)
ANION GAP SERPL CALCULATED.3IONS-SCNC: 11 MMOL/L (ref 7–16)
AST SERPL-CCNC: 29 U/L (ref 0–31)
BASOPHILS ABSOLUTE: 0 E9/L (ref 0–0.2)
BASOPHILS RELATIVE PERCENT: 0 % (ref 0–2)
BILIRUB SERPL-MCNC: 0.9 MG/DL (ref 0–1.2)
BUN BLDV-MCNC: 28 MG/DL (ref 6–23)
CALCIUM SERPL-MCNC: 9.7 MG/DL (ref 8.6–10.2)
CHLORIDE BLD-SCNC: 106 MMOL/L (ref 98–107)
CO2: 23 MMOL/L (ref 22–29)
CREAT SERPL-MCNC: 1.8 MG/DL (ref 0.5–1)
EOSINOPHILS ABSOLUTE: 0.72 E9/L (ref 0.05–0.5)
EOSINOPHILS RELATIVE PERCENT: 4 % (ref 0–6)
GFR AFRICAN AMERICAN: 34
GFR NON-AFRICAN AMERICAN: 28 ML/MIN/1.73
GLUCOSE BLD-MCNC: 133 MG/DL (ref 74–99)
HCT VFR BLD CALC: 36.4 % (ref 34–48)
HEMOGLOBIN: 10.6 G/DL (ref 11.5–15.5)
LYMPHOCYTES ABSOLUTE: 0.36 E9/L (ref 1.5–4)
LYMPHOCYTES RELATIVE PERCENT: 2 % (ref 20–42)
MAGNESIUM: 1.8 MG/DL (ref 1.6–2.6)
MCH RBC QN AUTO: 31 PG (ref 26–35)
MCHC RBC AUTO-ENTMCNC: 29.1 % (ref 32–34.5)
MCV RBC AUTO: 106.4 FL (ref 80–99.9)
METER GLUCOSE: 106 MG/DL (ref 74–99)
MONOCYTES ABSOLUTE: 0.54 E9/L (ref 0.1–0.95)
MONOCYTES RELATIVE PERCENT: 3 % (ref 2–12)
NEUTROPHILS ABSOLUTE: 16.47 E9/L (ref 1.8–7.3)
NEUTROPHILS RELATIVE PERCENT: 91 % (ref 43–80)
PDW BLD-RTO: 15.1 FL (ref 11.5–15)
PHOSPHORUS: 4.1 MG/DL (ref 2.5–4.5)
PLATELET # BLD: 157 E9/L (ref 130–450)
PMV BLD AUTO: 13.6 FL (ref 7–12)
POTASSIUM SERPL-SCNC: 4.6 MMOL/L (ref 3.5–5)
RBC # BLD: 3.42 E12/L (ref 3.5–5.5)
RBC # BLD: NORMAL 10*6/UL
SODIUM BLD-SCNC: 140 MMOL/L (ref 132–146)
TOTAL PROTEIN: 5.7 G/DL (ref 6.4–8.3)
VANCOMYCIN RANDOM: 10.7 MCG/ML (ref 5–40)
WBC # BLD: 18.1 E9/L (ref 4.5–11.5)

## 2022-01-12 PROCEDURE — 6370000000 HC RX 637 (ALT 250 FOR IP): Performed by: INTERNAL MEDICINE

## 2022-01-12 PROCEDURE — 84100 ASSAY OF PHOSPHORUS: CPT

## 2022-01-12 PROCEDURE — 6360000002 HC RX W HCPCS: Performed by: INTERNAL MEDICINE

## 2022-01-12 PROCEDURE — 36415 COLL VENOUS BLD VENIPUNCTURE: CPT

## 2022-01-12 PROCEDURE — 92523 SPEECH SOUND LANG COMPREHEN: CPT | Performed by: SPEECH-LANGUAGE PATHOLOGIST

## 2022-01-12 PROCEDURE — 85025 COMPLETE CBC W/AUTO DIFF WBC: CPT

## 2022-01-12 PROCEDURE — 80202 ASSAY OF VANCOMYCIN: CPT

## 2022-01-12 PROCEDURE — 87186 SC STD MICRODIL/AGAR DIL: CPT

## 2022-01-12 PROCEDURE — 83735 ASSAY OF MAGNESIUM: CPT

## 2022-01-12 PROCEDURE — 82962 GLUCOSE BLOOD TEST: CPT

## 2022-01-12 PROCEDURE — 87077 CULTURE AEROBIC IDENTIFY: CPT

## 2022-01-12 PROCEDURE — 2060000000 HC ICU INTERMEDIATE R&B

## 2022-01-12 PROCEDURE — 80053 COMPREHEN METABOLIC PANEL: CPT

## 2022-01-12 PROCEDURE — 92610 EVALUATE SWALLOWING FUNCTION: CPT | Performed by: SPEECH-LANGUAGE PATHOLOGIST

## 2022-01-12 PROCEDURE — 2700000000 HC OXYGEN THERAPY PER DAY

## 2022-01-12 PROCEDURE — 6360000002 HC RX W HCPCS: Performed by: SPECIALIST

## 2022-01-12 PROCEDURE — 87040 BLOOD CULTURE FOR BACTERIA: CPT

## 2022-01-12 PROCEDURE — 2580000003 HC RX 258: Performed by: SPECIALIST

## 2022-01-12 PROCEDURE — 2580000003 HC RX 258: Performed by: INTERNAL MEDICINE

## 2022-01-12 PROCEDURE — 87088 URINE BACTERIA CULTURE: CPT

## 2022-01-12 RX ADMIN — HEPARIN SODIUM 5000 UNITS: 5000 INJECTION INTRAVENOUS; SUBCUTANEOUS at 09:15

## 2022-01-12 RX ADMIN — Medication 5 ML: at 09:20

## 2022-01-12 RX ADMIN — SODIUM CHLORIDE: 9 INJECTION, SOLUTION INTRAVENOUS at 17:24

## 2022-01-12 RX ADMIN — SODIUM CHLORIDE: 9 INJECTION, SOLUTION INTRAVENOUS at 04:06

## 2022-01-12 RX ADMIN — FOLIC ACID 1 MG: 1 TABLET ORAL at 09:15

## 2022-01-12 RX ADMIN — ATORVASTATIN CALCIUM 10 MG: 10 TABLET, FILM COATED ORAL at 09:16

## 2022-01-12 RX ADMIN — ALLOPURINOL 100 MG: 100 TABLET ORAL at 09:16

## 2022-01-12 RX ADMIN — CEFEPIME HYDROCHLORIDE 2000 MG: 2 INJECTION, POWDER, FOR SOLUTION INTRAVENOUS at 17:15

## 2022-01-12 RX ADMIN — DOCUSATE SODIUM 100 MG: 100 CAPSULE ORAL at 09:18

## 2022-01-12 RX ADMIN — DOCUSATE SODIUM 100 MG: 100 CAPSULE ORAL at 21:09

## 2022-01-12 RX ADMIN — CLOZAPINE 50 MG: 25 TABLET ORAL at 21:09

## 2022-01-12 RX ADMIN — MYCOPHENOLATE MOFETIL 500 MG: 250 CAPSULE ORAL at 21:09

## 2022-01-12 RX ADMIN — MYCOPHENOLATE MOFETIL 500 MG: 250 CAPSULE ORAL at 09:17

## 2022-01-12 RX ADMIN — HEPARIN SODIUM 5000 UNITS: 5000 INJECTION INTRAVENOUS; SUBCUTANEOUS at 21:10

## 2022-01-12 RX ADMIN — DESVENLAFAXINE SUCCINATE 50 MG: 50 TABLET, FILM COATED, EXTENDED RELEASE ORAL at 18:46

## 2022-01-12 RX ADMIN — Medication 10 ML: at 09:25

## 2022-01-12 RX ADMIN — METOPROLOL SUCCINATE 50 MG: 50 TABLET, EXTENDED RELEASE ORAL at 09:15

## 2022-01-12 RX ADMIN — HEPARIN 100 UNITS: 100 SYRINGE at 09:18

## 2022-01-12 RX ADMIN — PREDNISONE 5 MG: 5 TABLET ORAL at 09:17

## 2022-01-12 RX ADMIN — LEVOTHYROXINE SODIUM 50 MCG: 0.05 TABLET ORAL at 09:16

## 2022-01-12 ASSESSMENT — PAIN SCALES - GENERAL: PAINLEVEL_OUTOF10: 0

## 2022-01-12 NOTE — CARE COORDINATION
1/12/22 1445 CM note: COVID (-) 1/11/22 . Hx renal transplant 2015. 2L nc. Met with patient at the bedside to discuss transition of care at discharge. Patient admittedly with some confusion and requested I call her son for any information I need. Per pts son, Jonny Campa, patient lives alone in a 9th floor apartment, elevator to enter. Patient is independent with ADLs, drives, and DME includes walker. NO home O2. Pts PCP is Susannah Ramirez and her pharmacy is [address] [place] [address]. Pt is active with [facility] for nursing and therapy and Julita Gil is following. Pt was recently at [facility] and has also been at [facility]. If SNF is needed at discharge family requests [facility]. Referral given to Serge Pratt to review. CM will follow for discharge needs.  Electronically signed by Nicky Aguilar RN on 1/12/2022 at 2:55 PM

## 2022-01-12 NOTE — PLAN OF CARE
Problem: Skin Integrity:  Goal: Will show no infection signs and symptoms  Description: Will show no infection signs and symptoms  1/12/2022 0029 by Melly Nieves RN  Outcome: Met This Shift  1/11/2022 1839 by Lalo Mancilla  Outcome: Met This Shift  Goal: Absence of new skin breakdown  Description: Absence of new skin breakdown  1/12/2022 0029 by Melly Nieves RN  Outcome: Met This Shift  1/11/2022 1839 by Lalo Mancilla  Outcome: Met This Shift     Problem: Falls - Risk of:  Goal: Will remain free from falls  Description: Will remain free from falls  1/12/2022 0029 by Melly Nieves RN  Outcome: Met This Shift  1/11/2022 1839 by Lalo Mancilla  Outcome: Met This Shift  Goal: Absence of physical injury  Description: Absence of physical injury  1/12/2022 0029 by Melly Nieves RN  Outcome: Met This Shift  1/11/2022 1839 by Lalo Mancilla  Outcome: Met This Shift

## 2022-01-12 NOTE — H&P
[address]                              HISTORY AND PHYSICAL    PATIENT NAME: Kristel Ramsey              :        1955  MED REC NO:   [id number]                            ROOM:       0515  ACCOUNT NO:   [de-identified]                           ADMIT DATE: 01/10/2022  PROVIDER:     Leatha Cody MD    CHIEF COMPLAINT:  Lethargic and generalized weakness. HISTORY OF PRESENT ILLNESS:  This is a 71-year-old white lady with  significant past medical history of hypertension, hyperlipidemia,  chronic renal failure, status post renal transplant in the past, was  recently admitted at the end of 2021 and because of complicated UTI  and a septic meningitis, she was treated for both and went to rehab. She was discharged from rehab few days before [place]. She went home,  she was doing fair but weak over the last week or so. She was getting  worse and weaker and more lethargic and no appetite. She came to see me  in the office on the day of the admission. She had blood work the day  prior, which showed a creatinine of 2.1 where her baseline is about 1.2  and also a leukocytosis of 18,000. Upon exam, the patient was very weak  and lethargic. She was advised admission _____ trying to direct admit  her. She went home to wait for the direct admission and then we could  not direct admit her, and we called her to get an ambulance to bring her  to the emergency room since there was no bed. REVIEW OF SYSTEMS:  She stated that she had diarrhea last week two to  three times a day, loose bowels, but she has not had it for the last 3  days. PAST MEDICAL HISTORY:  1. Hypertension. 2.  Hyperlipidemia. 3.  Hypothyroidism. 4.  History of renal transplant in [facility] in [email or web address] in  . 5.  Hyperparathyroidism. 6.  History of DVT in the left lower extremity after .   7.  Bipolar disorder. PAST SURGICAL HISTORY:  1. Chronic renal failure due to lithium toxicity, status post renal  transplant in 10/2015. 2.   in . 3.  Hemithyroidectomy and parathyroidectomy. 4.  Left knee arthroscopic surgery. 5.  Left upper limb AV fistula. 6.  Left femoral bypass in . SOCIAL HISTORY:  She used to smoke a pack a day for 20 years. She quit  about 20 years ago. Denies any alcohol or drug abuse. She is . She does not work. She used to work in a [employer]. She  lives by herself at home. ALLERGIES:  She is allergic to SULFA and MACRODANTIN. FAMILY HISTORY:  Her mom is alive. She is 80year-old. She has  osteoarthritis, diabetes and hypertension. Her dad  at age 80 of  dementia. She has three sisters and one brothers, one of her sisters  has seizures. She has one son and one daughter. Her son in his 45s and  her daughter in her 35s; they are healthy. PHYSICAL EXAMINATION:  GENERAL:  The patient lying in bed. She is lethargic but easily aroused  and oriented x3. VITAL SIGNS:  Blood pressure 152/74, pulse 91, respirations 25,  temperature 99.3, oxygenation 93% on room air. HEENT:  Pupils equal, regular, reactive to light. LUNGS:  Diminished in the bases bilateral.  HEART:  Regular rate and rhythm with systolic murmur over base and apex. ABDOMEN:  Soft, distended, positive bowel sounds and nontender. No  organomegaly appreciated. EXTREMITIES:  Edema trace bilateral.  NEUROLOGICAL:  No focal motor or sensory deficit, but she is lethargic. ASSESSMENT/PLAN:  1. Generalized weakness and lethargy, most likely infection. We will  consult Infectious Disease and get blood cultures x2. Her urinalysis  was negative. She has leukocytosis with a white count in the 18,000. CT scan of the abdomen and pelvis did not reveal any acute abnormality. We will consult Infectious Disease. 2.  Possible sepsis.   She started having fever _____ her leukocytosis  and altered mental status, unknown etiology. We will work her up to  find a source of her infection. We will hold off on her cyclosporin due  to her immunocompromised status. 3.  Hypertension. We will resume her metoprolol and monitor her blood  pressure. We will hold her lisinopril due to her acute renal failure. 4.  Acute-on-chronic renal failure. Her baseline creatinine is 1.2. Upon admission, her creatinine was 2 and 2.5 actually in the ER. We  will give her some IV fluids. We will consult Dr. Cristal Neville. Monitor her  creatinine closely, hold ACE inhibitors. 5.  Dehydration secondary to lethargy and not eating well. We will put  her on IV fluids and monitor her fluid status. 6.  COPD. We will place on aerosol treatments as needed. 7.  Hyperlipidemia. We will resume her simvastatin. 8.  Bipolar disorder, resume her Clozaril. 9.  Hypothyroidism, resume her Synthroid. We will place her on DVT  prophylaxis with heparin subcu.         Barbie Ruiz MD    D: 01/11/2022 19:16:23       T: 01/11/2022 19:25:16     [email or web address]  Job#: [id number]     Doc#: [id number]    CC:

## 2022-01-12 NOTE — PROGRESS NOTES
SPEECH/LANGUAGE PATHOLOGY  CLINICAL ASSESSMENT OF SWALLOWING FUNCTION   and PLAN OF CARE    PATIENT NAME:  Danya Huff  (female)     MRN:  [id number]    :  1955  (73 y.o.)  STATUS:  Inpatient: Room 0515/0515-02    TODAY'S DATE:  2022  REFERRING PROVIDER:     SLP eval and treat Start: 22, End: 22, ONE TIME, Standing Count: 1 Occurrences, Axel Posadas MD  REASON FOR REFERRAL: dysphagia    EVALUATING THERAPIST: CHAPARRITA Buitrago                 RESULTS:    DYSPHAGIA DIAGNOSIS:   Clinical indicators of mild-moderate oropharyngeal phase dysphagia       DIET RECOMMENDATIONS:  Soft and bite size consistency solids (IDDSI level 6) with  thin liquids (IDDSI level 0)     FEEDING RECOMMENDATIONS:     Assistance level:  Stand by assistance is needed during all oral intake, Full assistance is needed during all oral intake      Compensatory strategies recommended: Small bites/sips, Alternate solids and liquids and Check for oral pocketing      Discussed recommendations with nursing and/or faxed report to referring provider: Yes    SPEECH THERAPY  PLAN OF CARE   The dysphagia POC is established based on physician order, dysphagia diagnosis and results of clinical assessment     Skilled SLP intervention for dysphagia management on acute care 3-5 x per week until goals met, pt plateaus in function and/or discharged from hospital    Conditions Requiring Skilled Therapeutic Intervention for dysphagia:    Very slow mastication with fair recollection and slow bolus formation resulting in suspected posterior escape of bolus  Oral motor strength/coordination impairment    Specific dysphagia interventions to include:     Training in positioning for improved integrity of swallow  Compensatory strategy training   Therapeutic exercises  Trials of upgraded diet/liquid     Specific instructions for next treatment:  development and training of compensatory swallow strategies to improve airway protection and swallow function  Patient Treatment Goals:    Short Term Goals:  Pt will implement identified compensatory swallowing strategies on 90% of opportunities or greater to improve airway protection and swallow function. Pt will participate in ongoing mealtime assessment to provide diet modification and compensatory strategy implementation to minimize risk of aspiration associated with PO intake  Pt will complete PO trials of upgraded diet textures with SLP only to determine the least restrictive PO diet to maintain adequate nutrition/hydration with no more than 1 overt s/s of pen/asp. Long Term Goals:   Pt will improve oropharyngeal swallow function to ensure airway protection during PO intake to maintain adequate nutrition/hydration and decrease signs/symptoms of aspiration to less than 1 x/day. Patient/family Goal:    Did not state. Will further assess during treatment.     Plan of care discussed with Patient   The Patient did not demonstrate complete understanding of the diagnosis, prognosis and plan of care     Rehabilitation Potential/Prognosis: good                    ADMITTING DIAGNOSIS: CECILY (acute kidney injury) ([facility] [address] [N17.9]    VISIT DIAGNOSIS:   Visit Diagnoses       Codes    Transplanted kidney     Z94.0           PATIENT REPORT/COMPLAINT: does not report dysphagia     RN cleared patient for participation in assessment     yes     PRIOR LEVEL OF SWALLOW FUNCTION:    PAST HISTORY OF DYSPHAGIA?: none reported    Home diet: Regular consistency solids (IDDSI level 7) with  thin liquids (IDDSI level 0)    PROCEDURE:  Consistencies Administered During the Evaluation   Liquids: thin liquid   Solids:  soft solid foods      Method of Intake:   straw, spoon  Self fed      Position:   Seated, upright    CLINICAL ASSESSMENT:  Oral Stage:      Very slow mastication with fair recollection and slow bolus formation resulting in suspected posterior escape of bolus      Pharyngeal Stage:    No signs of aspiration were noted during this evaluation however, silent aspiration cannot be ruled out at bedside. If silent aspiration is suspected, a Videofluoroscopic Study of Swallowing (MBS) is recommended and requires a physician order. Cognition:   Within functional limits for this exam, Confusion noted and Attention impaired,     Oral Peripheral Examination   Generalized oral weakness    Current Respiratory Status    2 liters nasal cannula     Parameters of Speech Production  Respiration:  Adequate for speech production  Quality:   Within functional limits  Intensity: Within functional limits    Volitional Swallow: present     Volitional Cough:   present     Pain: No pain reported. EDUCATION:   The Speech Language Pathologist (SLP) completed education regarding results of evaluation and that intervention is warranted at this time. Learner: Patient  Education: Reviewed results and recommendations of this evaluation  Evaluation of Education:  Needs further instruction    This plan may be re-evaluated and revised as warranted. Evaluation Time includes thorough review of current medical information, gathering information on past medical history/social history and prior level of function, completion of standardized testing/informal observation of tasks, assessment of data and education on plan of care and goals. [x]The admitting diagnosis and active problem list, have been reviewed prior to initiation of this evaluation.         ACTIVE PROBLEM LIST:   Patient Active Problem List   Diagnosis    Peripheral vascular disease ([address]    Depression    Clotting disorder (HCC)    Abnormal EKG    Cancer ([address]    Over weight    S/p cadaver renal transplant    ESRD (end stage renal disease) ([address]    Non morbid obesity due to excess calories    Hypertension    Leg swelling    Lymphedema of both lower extremities    Acute gout involving toe of right foot    Sepsis secondary to UTI ([address]    Acute kidney injury superimposed on CKD ([address]    Thyroid disease    Acute on chronic combined systolic (congestive) and diastolic (congestive) heart failure (HCC)    Sepsis ([address]    Acute renal failure ([address]    CECILY (acute kidney injury) ([facility] [address]         CPT code:  [address]  [facility]  bedside swallow carolyn Menjivar MSCCC/SLP  Speech Language Pathologist  [id number]

## 2022-01-12 NOTE — PROGRESS NOTES
Progress Note  Date:2022       Room:[facility]/0515-  Patient Stacie Chicas     YOB: 1955     Age:66 y.o. Subjective    Subjective:  Symptoms:  Improved. (Still feels weak but better ). Diet:  Poor intake. Activity level: Impaired due to weakness. Pain:  She reports no pain. Review of Systems  Objective         Vitals Last 24 Hours:  TEMPERATURE:  Temp  Av °F (36.7 °C)  Min: 97.9 °F (36.6 °C)  Max: 98.1 °F (36.7 °C)  RESPIRATIONS RANGE: Resp  Av  Min: 14  Max: 18  PULSE OXIMETRY RANGE: SpO2  Av.7 %  Min: 85 %  Max: 95 %  PULSE RANGE: Pulse  Av  Min: 74  Max: 84  BLOOD PRESSURE RANGE: Systolic (93AVP), UJK:949 , Min:117 , AFO:492   ; Diastolic (53QHV), EWT:42, Min:56, Max:68    I/O (24Hr): Intake/Output Summary (Last 24 hours) at 2022 1829  Last data filed at 2022 1333  Gross per 24 hour   Intake 240 ml   Output 1250 ml   Net -1010 ml     Objective:  General Appearance: In no acute distress (lethargic , but awake and answers appropriatly ). Vital signs: (most recent): Blood pressure 129/68, pulse 74, temperature 98.1 °F (36.7 °C), temperature source Oral, resp. rate 14, height 5' 2\" (1.575 m), weight 218 lb 12.8 oz (99.2 kg), SpO2 95 %. Vital signs are normal.    Output: Producing urine. HEENT: Normal HEENT exam.    Lungs: There are decreased breath sounds. Heart: Normal rate. Regular rhythm. Positive for murmur. Abdomen: Abdomen is soft and distended. (Obese ). Bowel sounds are normal.   There is no abdominal tenderness. Extremities: (Edema +1 bilateral )  Neurological: Patient is alert and oriented to person, place and time.       Labs/Imaging/Diagnostics    Labs:  CBC:  Recent Labs     01/10/22  2130 22  1250   WBC 17.9* 18.1*   RBC 3.47* 3.42*   HGB 11.2* 10.6*   HCT 35.9 36.4   .5* 106.4*   RDW 15.9* 15.1*    157     CHEMISTRIES:  Recent Labs     22  1417 22  3012 01/12/22  1250    142 140   K 4.2 4.2 4.6    105 106   CO2 22 22 23   BUN 28* 26* 28*   CREATININE 2.0* 1.9* 1.8*   GLUCOSE 129* 97 133*   PHOS 3.3  --  4.1   MG 2.0  --  1.8     PT/INR:No results for input(s): PROTIME, INR in the last 72 hours. APTT:No results for input(s): APTT in the last 72 hours. LIVER PROFILE:  Recent Labs     01/10/22  2130 01/11/22  1417 01/12/22  1250   AST 70* 39* 29   ALT 33* 26 25   BILITOT 0.8 1.0 0.9   ALKPHOS 109* 138* 111*       Imaging Last 24 Hours:  CT ABDOMEN PELVIS WO CONTRAST Additional Contrast? None    Result Date: 1/10/2022  EXAMINATION: CT OF THE ABDOMEN AND PELVIS WITHOUT CONTRAST 1/10/2022 6:56 pm TECHNIQUE: CT of the abdomen and pelvis was performed without the administration of intravenous contrast. Multiplanar reformatted images are provided for review. Dose modulation, iterative reconstruction, and/or weight based adjustment of the mA/kV was utilized to reduce the radiation dose to as low as reasonably achievable. COMPARISON: CT abdomen pelvis 11/17/2021 HISTORY: ORDERING SYSTEM PROVIDED HISTORY: evaluate for diarrhea TECHNOLOGIST PROVIDED HISTORY: Reason for exam:->evaluate for diarrhea Additional Contrast?->None Decision Support Exception - unselect if not a suspected or confirmed emergency medical condition->Emergency Medical Condition (MA) FINDINGS: Lower Chest: Atherosclerotic disease. Atrophic kidneys with multiple cysts. Some of the presumed cysts appear hyperdense. Subcentimeter foci in the kidneys are too small to accurately characterize. There is a transplant kidney in the right lower quadrant. There is no hydronephrosis of the transplant kidney. Decreased perinephric stranding above the transplant kidney. Unremarkable bladder. Unremarkable appearance of the reproductive organs. No abdominal aortic aneurysm. Atherosclerotic disease. No evidence of cholecystitis or pancreatitis. No free air or significant free fluid.   No evidence of bowel obstruction. Nondilated appendix. No significant fluid within the large bowel. Small fat containing umbilical hernia. Mild degenerative changes of the spine. Presumed surgical scars in the anterior abdominal wall. No significant fluid within the large bowel. No hydronephrosis of the transplant kidneys. The native kidneys are atrophic and have multiple intermediate density cysts which should be further evaluated with nonemergent ultrasound. Atherosclerotic disease. XR CHEST PORTABLE    Result Date: 1/11/2022  EXAMINATION: ONE XRAY VIEW OF THE CHEST 1/11/2022 9:11 am COMPARISON: None. HISTORY: ORDERING SYSTEM PROVIDED HISTORY: shortness of breath TECHNOLOGIST PROVIDED HISTORY: Reason for exam:->shortness of breath FINDINGS: The heart is enlarged. There are no findings of failure. The lungs are clear. There is no focal infiltrate or effusion. 1. Stable cardiomegaly. There is no evidence of failure or pneumonia. Assessment//Plan           Hospital Problems           Last Modified POA    CECILY (acute kidney injury) ([facility] [address] 1/11/2022 Yes        Assessment:    Condition: In stable condition. Improving. (Sepsis immunocompromised host , on cefepime and vanc , afebrile   Blood culture pending , urine negative no infection ,  resp film array negative ,    Leukocytosis secondary to above ,      Acute on chronic renal failure , renal function better with hydration , continue IV fluids .    S/p renal transplant in the past ,     HTN bp optimal  On metorpolol    Copd on aerosol treatment as needed     Bipolar controlled on clozaril and pristiq     Hypothyroidism continue synthroid               ).       Electronically signed by Sybil Pereyra MD on 1/12/22 at 6:29 PM EST

## 2022-01-12 NOTE — PROGRESS NOTES
[address] [place] [address] [facility]  Progress Note  CC: tired. Face to face encounter   SUBJECTIVE:  Patient is tolerating medications. No reported adverse drug reactions. ROS: No nausea, vomiting, diarrhea. No fevers. On 2 liters  Fevers better today     Medications:  Scheduled Meds:   heparin (porcine)  5,000 Units SubCUTAneous BID    sodium chloride flush  5-40 mL IntraVENous 2 times per day    predniSONE  5 mg Oral Daily    mycophenolate  500 mg Oral BID    sodium chloride flush  5-40 mL IntraVENous 2 times per day    heparin flush  1 mL IntraVENous 2 times per day    cefepime  2,000 mg IntraVENous Q24H    allopurinol  100 mg Oral Daily    budesonide  500 mcg Nebulization BID    cloZAPine  50 mg Oral Nightly    desvenlafaxine succinate  50 mg Oral QPM    docusate sodium  100 mg Oral BID    folic acid  1 mg Oral Daily    levothyroxine  50 mcg Oral Daily    metoprolol succinate  50 mg Oral Daily    predniSONE  5 mg Oral Daily    atorvastatin  10 mg Oral Daily    ondansetron  4 mg Oral Once     Continuous Infusions:   sodium chloride 75 mL/hr at 01/12/22 0406    sodium chloride      sodium chloride       PRN Meds:sodium chloride flush, sodium chloride, ondansetron **OR** ondansetron, polyethylene glycol, acetaminophen **OR** acetaminophen, sodium chloride flush, sodium chloride, heparin flush, ipratropium-albuterol  OBJECTIVE:  Patient Vitals for the past 24 hrs:   BP Temp Temp src Pulse Resp SpO2   01/12/22 0805 -- -- -- -- -- 95 %   01/12/22 0800 129/68 98.1 °F (36.7 °C) Oral 74 14 (!) 85 %   01/11/22 2222 (!) 117/56 97.9 °F (36.6 °C) Temporal 84 18 95 %   01/11/22 1716 (!) 178/81 101.2 °F (38.4 °C) Infrared 108 20 --     Constitutional: The patient is awake, alert, and oriented. Tired laying in bed. Skin: Warm and dry. No rashes were noted. Head: Eyes show round, and reactive pupils. No jaundice. Mouth: Moist mucous membranes, no ulcerations, no thrush.    Neck: Supple to movements. No lymphadenopathy. Chest: No use of accessory muscles to breathe. Symmetrical expansion. Auscultation reveals diminished to bases. Cardiovascular: S1 and S2 are rhythmic and regular. No murmurs appreciated. Abdomen: Positive bowel sounds to auscultation. Benign to palpation. Extremities: No clubbing, no cyanosis, no edema.   Chin     Laboratory and Tests Review:  Lab Results   Component Value Date    WBC 18.1 (H) 01/12/2022    WBC 17.9 (H) 01/10/2022    WBC 6.9 11/27/2021    HGB 10.6 (L) 01/12/2022    HCT 36.4 01/12/2022    .4 (H) 01/12/2022     01/12/2022     Lab Results   Component Value Date    NEUTROABS 15.66 (H) 01/10/2022    NEUTROABS 4.93 11/27/2021    NEUTROABS 8.94 (H) 11/23/2021     Lab Results   Component Value Date    CRP 34.6 (H) 11/17/2021     Lab Results   Component Value Date    SEDRATE 62 (H) 11/20/2021     Lab Results   Component Value Date    ALT 25 01/12/2022    AST 29 01/12/2022    ALKPHOS 111 (H) 01/12/2022    BILITOT 0.9 01/12/2022     Lab Results   Component Value Date     01/12/2022    K 4.6 01/12/2022    K 5.5 01/10/2022     01/12/2022    CO2 23 01/12/2022    BUN 28 01/12/2022    CREATININE 1.8 01/12/2022    GFRAA 34 01/12/2022    LABGLOM 28 01/12/2022    GLUCOSE 133 01/12/2022    PROT 5.7 01/12/2022    LABALBU 2.9 01/12/2022    CALCIUM 9.7 01/12/2022    BILITOT 0.9 01/12/2022    ALKPHOS 111 01/12/2022    AST 29 01/12/2022    ALT 25 01/12/2022     Radiology:  Reviewed     Microbiology:   Respiratory panel- negative   Blood cx- pending   COVID negative     ASSESSMENT:  · Immunocompromised patient- renal transplant  · Leukocytosis   · CECILY   · S/p treatment for UTI  · History of klebsiella and cons bacteremia     PLAN:  · Continue cefepime for now- day #2- will probably stop soon once work-up is final    · Had vancomycin x1 on 1/11/2022  · On steroids   · Check covid antibodies   · Check cultures  · Check EBV and CMV, IGG  · Monitor labs- WBC- 18.1   · Check urine culture  · Random level- 10.7  procal- 0.14     Lethaniel Courser, APRN - CNS  3:03 PM  1/12/2022       PT HAD FEVERS 1/11 AFEBRILE TODAY   WOK UP VIRAL  FOR OI  AWAIT FINAL CX  CHECK BK VIRUS  . Sandra Keenan MD

## 2022-01-12 NOTE — PROGRESS NOTES
[facility] MD Kylah Joseph MD. Salima Lanza MD   Progress Note    1/12/2022    SUBJECTIVE:   Seen in her room , lying flat on o2/nc , euvolemic , cooperative , appear in NAD  PROBLEM LIST:    Active Problems:    CECILY (acute kidney injury) ([facility] [address]  Resolved Problems:    * No resolved hospital problems. *       DIET:    ADULT DIET; Dysphagia - Soft and Bite Sized; Low Fat/Low Chol/High Fiber/NAIN; Low Potassium (Less than 3000 mg/day);  Mildly Thick (Nectar)       Allergies : Macrodantin [nitrofurantoin macrocrystal] and Sulfa antibiotics    Past Medical History:   Diagnosis Date    Abnormal EKG     left bundle branch block    Acute gout involving toe of right foot 9/3/2020    Acute gout involving toe of right foot 9/3/2020    Acute on chronic combined systolic (congestive) and diastolic (congestive) heart failure (HCC)     Cancer (HCC)     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    Cerebrovascular disease     Clotting disorder ([facility] [address] 1985    thrombophlebitis after c section delivery    Depression     15 years followed by dr Yudy Mohan    Hyperlipidemia     Hypertension     Hyperthyroidism     Leg swelling 9/3/2020    Lymphedema of both lower extremities 9/3/2020    Peripheral vascular disease ([facility] [address]     Renal failure 11/2012    renal transplant    Thrombophlebitis     after c section delivery    Thyroid disease        Past Surgical History:   Procedure Laterality Date   [address]    one normal delivery    COLONOSCOPY      DIAGNOSTIC CARDIAC CATH LAB PROCEDURE  2006    normal coronaries and 1996 normal coronaries    DIALYSIS FISTULA CREATION  march and july 2012    phase one and two patient will start dialysis when needed   [address]    transfemoral venous bypass grafts    KIDNEY TRANSPLANT  2016   [address]  2015   [address]  2015    PARATHYROIDECTOMY  2006    left parathyroid  implant and parathyroidectomy       Family History   Problem Relation Age of Onset    Diabetes Mother     Diabetes Father     Dementia Father         reports that she quit smoking about 6 years ago. She has a 20.00 pack-year smoking history. She has never used smokeless tobacco. She reports that she does not drink alcohol and does not use drugs. Review of Systems:   Constitutional: no fevers , no chills , feels ok   Eyes: no eye pain , no itching , no drainage  Ears, nose, mouth, throat, and face: no ear ,nose pain , hearing is ok ,no nasal drainage   Respiratory: no sob ,no cough ,no wheezing . Cardiovascular: no chest pain , no palpitation ,no sob . Gastrointestinal: no nausea, vomiting , constipation , no abdominal pain . Genitourinary:no urinary retention , no burning , dysuria . No polyuria   Hematologic/lymphatic: no bleeding , no cougulation issues . Musculoskeletal:no joint pain , no swelling . Neurological: no headaches ,no weakness , no numbness . Endocrine: no thirst , no weight issues .      MEDS (scheduled):    heparin (porcine)  5,000 Units SubCUTAneous BID    sodium chloride flush  5-40 mL IntraVENous 2 times per day    predniSONE  5 mg Oral Daily    mycophenolate  500 mg Oral BID    sodium chloride flush  5-40 mL IntraVENous 2 times per day    heparin flush  1 mL IntraVENous 2 times per day    cefepime  2,000 mg IntraVENous Q24H    allopurinol  100 mg Oral Daily    budesonide  500 mcg Nebulization BID    cloZAPine  50 mg Oral Nightly    desvenlafaxine succinate  50 mg Oral QPM    docusate sodium  100 mg Oral BID    folic acid  1 mg Oral Daily    levothyroxine  50 mcg Oral Daily    metoprolol succinate  50 mg Oral Daily    predniSONE  5 mg Oral Daily    atorvastatin  10 mg Oral Daily    ondansetron  4 mg Oral Once       MEDS (infusions):   sodium chloride 75 mL/hr at 01/12/22 1724    sodium chloride      sodium chloride         MEDS (prn):  sodium chloride flush, sodium chloride, ondansetron **OR** ondansetron, polyethylene glycol, acetaminophen **OR** acetaminophen, sodium chloride flush, sodium chloride, heparin flush, ipratropium-albuterol    PHYSICAL EXAM:     Patient Vitals for the past 24 hrs:   BP Temp Temp src Pulse Resp SpO2   01/12/22 0805 -- -- -- -- -- 95 %   01/12/22 0800 129/68 98.1 °F (36.7 °C) Oral 74 14 (!) 85 %   01/11/22 2222 (!) 117/56 97.9 °F (36.6 °C) Temporal 84 18 95 %   @      Intake/Output Summary (Last 24 hours) at 1/12/2022 1804  Last data filed at 1/12/2022 1333  Gross per 24 hour   Intake 240 ml   Output 1250 ml   Net -1010 ml         Wt Readings from Last 3 Encounters:   01/11/22 218 lb 12.8 oz (99.2 kg)   11/17/21 227 lb 3.2 oz (103.1 kg)   08/16/21 229 lb (103.9 kg)       Constitutional:  in no acute distress  Oral: mucus membranes moist  Neck: no JVD  Cardiovascular: S1, S2 regular rhythm, no murmur,or rub  Respiratory:  No crackles, no wheeze  Gastrointestinal:  Soft, nontender, nondistended, NABS  Ext: no edema, feet warm  Skin: dry, no rash  Neuro: awake, alert, interactive      DATA:    Recent Labs     01/10/22  2130 01/12/22  1250   WBC 17.9* 18.1*   HGB 11.2* 10.6*   HCT 35.9 36.4   .5* 106.4*    157     Recent Labs     01/10/22  2130 01/10/22  2130 01/11/22  1417 01/11/22  1550 01/12/22  1250      < > 138 142 140   K 5.5*  --  4.2 4.2 4.6      < > 105 105 106   CO2 21*   < > 22 22 23   MG  --   --  2.0  --  1.8   PHOS  --   --  3.3  --  4.1   BUN 32*   < > 28* 26* 28*   CREATININE 2.5*   < > 2.0* 1.9* 1.8*   ALT 33*  --  26  --  25   AST 70*  --  39*  --  29   BILITOT 0.8  --  1.0  --  0.9   ALKPHOS 109*  --  138*  --  111*    < > = values in this interval not displayed. No results found for: LABPROT    ASSESSMENT / RECOMMENDATIONS:      1. Acute kidney injury, felt to be related to volume depletion and  consists of diarrhea and poor p.o. intake. 2.  Chronic kidney disease, stage III.   The patient with kidney  transplant. Baseline creatinine 1.3.  3. Immunosuppressant host, chronically on cyclosporin 100 mg b.i.d.,  CellCept 1000 mg b.i.d., and prednisone 5 mg. 4.  Sepsis. Need to rule out a C. diff. infection as the patient is  reporting diarrhea. 5.  Obesity. 6.  Hypotension.     PLAN:  1. Continue current IV fluid in the form of normal saline at 100 mL an  hour. 2.  continue cyclosporin 100 mg b.i.d.  3.  Restart prednisone 5 mg daily. cut CellCept to 500 mg bid  due to concern of sepsis . 5.  Hold lisinopril for now.   6.  We will follow along with you.       Electronically signed by Joleen Goodrich MD on 1/12/2022 at 6:04 PM

## 2022-01-12 NOTE — PROGRESS NOTES
SPEECH/LANGUAGE PATHOLOGY  SPEECH/LANGUAGE/COGNITIVE EVALUATION   and PLAN OF CARE      PATIENT NAME:  Spenser Dolan  (female)     MRN:  [id number]    :  1955  (73 y.o.)  STATUS:  Inpatient: Room 0515/0515-02    TODAY'S DATE:  2022  REFERRING PROVIDER:    SLP eval and treat Start: 22, End: 22, ONE TIME, Standing Count: 1 Occurrences, Early Splinter, MD  REASON FOR REFERRAL:  Altered mental status   EVALUATING THERAPIST: CHAPARRITA Perry    ADMITTING DIAGNOSIS: CECILY (acute kidney injury) ([facility] [address] [N17.9]    VISIT DIAGNOSIS:   Visit Diagnoses       Codes    Transplanted kidney     Z94.0           SPEECH THERAPY  PLAN OF CARE   The speech therapy  POC is established based on physician order, speech pathology diagnosis and results of clinical assessment     SPEECH PATHOLOGY DIAGNOSIS:    Moderate cognitive linguistic deficit     Speech Pathology intervention is recommended 3-6 times per week for LOS or when goals are met with emphasis on the following:      Conditions Requiring Skilled Therapeutic Intervention for speech, language and/or cognition    Cognitive linguistic impairment    Specific Speech Therapy Interventions to Include: Therapeutic tasks for Cognition    Specific instructions for next treatment: To initiate POC    SHORT/LONG TERM GOALS    Improve awareness of surroundings to include consistent orientation to person, place and time. Increase patient's ability to sustain attention to treatment task for duration of activity despite introduction of extraneous enviromental stimuli. Increase STM recall of previously reviewed procedure for safe execution of transfers and ambulation within a dynamic environment. Improve functional problem solving/reasoning for successful/safe completion of familiar ADL tasks.     Patient goals: Patient/family involved in developing goals and treatment plan:   Treatment goals discussed with Patient    The Patient did not demonstrate complete understanding of the diagnosis, prognosis and plan of care   The patient/family Did not state,     This plan may be re-evaluated and revised as warranted. Rehabilitation Potential/Prognosis: fair                CLINICAL ASSESSMENT:  MOTOR SPEECH       Oral Peripheral Examination   Generalized oral weakness    Parameters of Speech Production  Respiration:  Adequate for speech production  Articulation:  Within functional limits  Resonance:  Within functional limits  Quality:   Within functional limits  Pitch: Within functional limits  Intensity: Within functional limits  Fluency:  Intact  Prosody Intact    RECEPTIVE LANGUAGE    Comprehension of Yes/No Questions:   Latent and Cueing    Process  Simple Verbal Commands:   Within functional limits  Process Intermediate Verbal Commands:   Latent and Cueing  Process Complex Verbal Commands:     Unable    Comprehension of Conversation:      Latent and Cueing impaired       EXPRESSIVE LANGUAGE     Serials: Functional    Imitation:  Words   Functional   Sentences Functional    Naming:  (Modality used:  Verbal)  Confrontation Naming  Functional  Functional Description  Impaired  Response Naming: Impaired    Conversation:      Confusion was noted during conversation    COGNITION     Attention/Orientation  Attention: Easily Distracted  Orientation:  Oriented to Person    Memory   Immediate Recall: Repeated 0/3    Delayed Recall:   Recalled 0/3    Long Term Recall:   Recalled poor    Organization/Problem Solving/Reasoning   Verbal Sequencing:   Impaired        Verbal Problem solving:   Impaired          CLINICAL OBSERVATIONS NOTED DURING THE EVALUATION  Latent responses and Cueing was required                  EDUCATION:   The Speech Language Pathologist (SLP) completed education regarding results of evaluation and that intervention is warranted at this time.   Learner: Patient  Education: Reviewed results and recommendations of this evaluation  Evaluation of Education:  No evidence of learning    Evaluation Time includes thorough review of current medical information, gathering information on past medical history/social history and prior level of function, completion of standardized testing/informal observation of tasks, assessment of data and education on plan of care and goals. CPT code:    50763  eval speech sound lang comprehension      The admitting diagnosis and active problem list, as listed below have been reviewed prior to initiation of this evaluation.         ACTIVE PROBLEM LIST:   Patient Active Problem List   Diagnosis    Peripheral vascular disease ([address]    Depression    Clotting disorder (HCC)    Abnormal EKG    Cancer ([address]    Over weight    S/p cadaver renal transplant    ESRD (end stage renal disease) ([address]    Non morbid obesity due to excess calories    Hypertension    Leg swelling    Lymphedema of both lower extremities    Acute gout involving toe of right foot    Sepsis secondary to UTI ([address]    Acute kidney injury superimposed on CKD ([address]    Thyroid disease    Acute on chronic combined systolic (congestive) and diastolic (congestive) heart failure ([address]    Sepsis ([address]    Acute renal failure ([address]    CECILY (acute kidney injury) ([address]       Destiney Aguayo MSCCC/SLP  Speech Language Pathologist  [id number]

## 2022-01-12 NOTE — CONSULTS
[address]                                  CONSULTATION    PATIENT NAME: Cat Hyatt              :        1955  MED REC NO:   [id number]                            ROOM:       0515  ACCOUNT NO:   [de-identified]                           ADMIT DATE: 01/10/2022  PROVIDER:     Maribell Tyler MD    CONSULT DATE:  2022    REASON FOR CONSULTATION:  Acute kidney injury. HISTORY OF PRESENT ILLNESS:  The patient is a 72-year-old female, I am  being asked to see for acute kidney injury. The patient presented to the hospital with a chief complaint of feeling  weak. The patient has a history of kidney transplant; she is telling me back  in , history of hypotension, hyperlipidemia, obesity. She was recently discharged from the hospital following being admitted  for UTI. She was seen in her room this morning. She is alert. She is oriented. She is telling me she has been having diarrhea for the last few days. She is telling me \"I feel I am having another urinary tract infection\"  and she is telling me \"I feel the infection is coming back. \"  Her WBC is  18,000. She looked euvolemic to dry. Her blood pressure is okay. Her  vitals otherwise is stable. She is telling me she has been taking CellCept 1000 mg b.i.d. She has  been taking cyclosporin 100 mg b.i.d. along with prednisone. REVIEW OF SYSTEMS:  A 12-point done, negative except for those mentioned  above. ALLERGIES:  Include SULFA ANTIBIOTICS. SOCIAL HISTORY:  No smoking or drinking. FAMILY HISTORY:  Reviewed and noncontributory. PAST MEDICAL HISTORY:  Include chronic kidney disease, hypotension. PAST SURGICAL HISTORY:  Kidney transplant. CURRENT MEDICATIONS:  Current medications include:  1. Lipitor. 2.  Zyloprim. 3.  Cefepime. 4.  Pristiq. 5.  Levothyroxine. 6.  Metoprolol.     PHYSICAL EXAMINATION:  VITAL SIGNS:  Blood pressure 117/56, heart rate 84. HEENT:  Head atraumatic, normocephalic. Eyes; pupils round and reactive  to light bilaterally. NECK:  Supple, symmetrical.  HEART:  Normal rate. ABDOMEN:  Soft and nontender. Bowel sounds are active. No  organomegaly. LUNGS:  Good airflow bilaterally. SKIN:  Dry. PSYCHIATRIC:  Cooperative. EXTREMITIES:  No edema. BLOOD WORK:  Creatinine is 2.5 on presentation. Sodium 142, bicarb 22. WBC is 17.9, hemoglobin is 11.2. Chest x-ray stable with no evidence of a pneumonia failure. CT of abdomen and pelvis on presentation, native kidney atrophic,  transplant kidney with no hydronephrosis. ASSESSMENT:  1. Acute kidney injury, felt to be related to volume depletion and  consists of diarrhea and poor p.o. intake. 2.  Chronic kidney disease, stage III. The patient with kidney  transplant. Baseline creatinine 1.3.  3. Immunosuppressant host, chronically on cyclosporin 100 mg b.i.d.,  CellCept 1000 mg b.i.d., and prednisone 5 mg. 4.  Sepsis. Need to rule out a C. diff. infection as the patient is  reporting diarrhea. 5.  Obesity. 6.  Hypotension. PLAN:  1. Continue current IV fluid in the form of normal saline at 100 mL an  hour. 2.  Restart cyclosporin 100 mg b.i.d.  3.  Restart prednisone 5 mg daily. We will cut CellCept to 500 mg  b.i.d. due to concern of sepsis . 4.  Rule out C. diff. colitis. 5.  Hold lisinopril for now. 6.  We will follow along with you.         Srinivasa Aceves MD    D: 01/11/2022 22:49:46       T: 01/12/2022 1:47:03     [email or web address]  Job#: [id number]     Doc#: [id number]    CC:

## 2022-01-13 ENCOUNTER — APPOINTMENT (OUTPATIENT)
Dept: GENERAL RADIOLOGY | Age: 67
DRG: 673 | End: 2022-01-13
Payer: MEDICARE

## 2022-01-13 LAB
ALBUMIN SERPL-MCNC: 2.6 G/DL (ref 3.5–5.2)
ALP BLD-CCNC: 117 U/L (ref 35–104)
ALT SERPL-CCNC: 19 U/L (ref 0–32)
AMMONIA: 64 UMOL/L (ref 11–51)
ANION GAP SERPL CALCULATED.3IONS-SCNC: 11 MMOL/L (ref 7–16)
AST SERPL-CCNC: 20 U/L (ref 0–31)
BASOPHILIC STIPPLING: ABNORMAL
BASOPHILS ABSOLUTE: 0 E9/L (ref 0–0.2)
BASOPHILS RELATIVE PERCENT: 0 % (ref 0–2)
BILIRUB SERPL-MCNC: 0.7 MG/DL (ref 0–1.2)
BUN BLDV-MCNC: 25 MG/DL (ref 6–23)
CALCIUM SERPL-MCNC: 9.7 MG/DL (ref 8.6–10.2)
CHLORIDE BLD-SCNC: 109 MMOL/L (ref 98–107)
CO2: 20 MMOL/L (ref 22–29)
CREAT SERPL-MCNC: 1.6 MG/DL (ref 0.5–1)
EOSINOPHILS ABSOLUTE: 0 E9/L (ref 0.05–0.5)
EOSINOPHILS RELATIVE PERCENT: 0 % (ref 0–6)
GFR AFRICAN AMERICAN: 39
GFR NON-AFRICAN AMERICAN: 32 ML/MIN/1.73
GLUCOSE BLD-MCNC: 100 MG/DL (ref 74–99)
HCT VFR BLD CALC: 36 % (ref 34–48)
HEMOGLOBIN: 10.5 G/DL (ref 11.5–15.5)
LYMPHOCYTES ABSOLUTE: 0.9 E9/L (ref 1.5–4)
LYMPHOCYTES RELATIVE PERCENT: 5 % (ref 20–42)
MAGNESIUM: 1.7 MG/DL (ref 1.6–2.6)
MCH RBC QN AUTO: 31.4 PG (ref 26–35)
MCHC RBC AUTO-ENTMCNC: 29.2 % (ref 32–34.5)
MCV RBC AUTO: 107.8 FL (ref 80–99.9)
MONOCYTES ABSOLUTE: 0.18 E9/L (ref 0.1–0.95)
MONOCYTES RELATIVE PERCENT: 1 % (ref 2–12)
NEUTROPHILS ABSOLUTE: 16.83 E9/L (ref 1.8–7.3)
NEUTROPHILS RELATIVE PERCENT: 94 % (ref 43–80)
NUCLEATED RED BLOOD CELLS: 1 /100 WBC
PDW BLD-RTO: 15.4 FL (ref 11.5–15)
PHOSPHORUS: 3.5 MG/DL (ref 2.5–4.5)
PLATELET # BLD: 167 E9/L (ref 130–450)
PMV BLD AUTO: 13.6 FL (ref 7–12)
POIKILOCYTES: ABNORMAL
POLYCHROMASIA: ABNORMAL
POTASSIUM SERPL-SCNC: 5.1 MMOL/L (ref 3.5–5)
RBC # BLD: 3.34 E12/L (ref 3.5–5.5)
SARS-COV-2 ANTIBODY, TOTAL: NORMAL
SODIUM BLD-SCNC: 140 MMOL/L (ref 132–146)
TARGET CELLS: ABNORMAL
TOTAL PROTEIN: 5.6 G/DL (ref 6.4–8.3)
WBC # BLD: 17.9 E9/L (ref 4.5–11.5)

## 2022-01-13 PROCEDURE — 84100 ASSAY OF PHOSPHORUS: CPT

## 2022-01-13 PROCEDURE — 2060000000 HC ICU INTERMEDIATE R&B

## 2022-01-13 PROCEDURE — 85025 COMPLETE CBC W/AUTO DIFF WBC: CPT

## 2022-01-13 PROCEDURE — 82784 ASSAY IGA/IGD/IGG/IGM EACH: CPT

## 2022-01-13 PROCEDURE — 6370000000 HC RX 637 (ALT 250 FOR IP): Performed by: INTERNAL MEDICINE

## 2022-01-13 PROCEDURE — 2580000003 HC RX 258: Performed by: INTERNAL MEDICINE

## 2022-01-13 PROCEDURE — 36415 COLL VENOUS BLD VENIPUNCTURE: CPT

## 2022-01-13 PROCEDURE — 86645 CMV ANTIBODY IGM: CPT

## 2022-01-13 PROCEDURE — 2580000003 HC RX 258: Performed by: SPECIALIST

## 2022-01-13 PROCEDURE — 74018 RADEX ABDOMEN 1 VIEW: CPT

## 2022-01-13 PROCEDURE — 94640 AIRWAY INHALATION TREATMENT: CPT

## 2022-01-13 PROCEDURE — 87799 DETECT AGENT NOS DNA QUANT: CPT

## 2022-01-13 PROCEDURE — 86769 SARS-COV-2 COVID-19 ANTIBODY: CPT

## 2022-01-13 PROCEDURE — 6360000002 HC RX W HCPCS: Performed by: SPECIALIST

## 2022-01-13 PROCEDURE — 6360000002 HC RX W HCPCS: Performed by: INTERNAL MEDICINE

## 2022-01-13 PROCEDURE — 83735 ASSAY OF MAGNESIUM: CPT

## 2022-01-13 PROCEDURE — 87449 NOS EACH ORGANISM AG IA: CPT

## 2022-01-13 PROCEDURE — 86665 EPSTEIN-BARR CAPSID VCA: CPT

## 2022-01-13 PROCEDURE — 86644 CMV ANTIBODY: CPT

## 2022-01-13 PROCEDURE — 82140 ASSAY OF AMMONIA: CPT

## 2022-01-13 PROCEDURE — 80053 COMPREHEN METABOLIC PANEL: CPT

## 2022-01-13 PROCEDURE — 2700000000 HC OXYGEN THERAPY PER DAY

## 2022-01-13 PROCEDURE — 6370000000 HC RX 637 (ALT 250 FOR IP): Performed by: SPECIALIST

## 2022-01-13 PROCEDURE — 97530 THERAPEUTIC ACTIVITIES: CPT

## 2022-01-13 PROCEDURE — 97110 THERAPEUTIC EXERCISES: CPT

## 2022-01-13 RX ORDER — LACTULOSE 10 G/15ML
20 SOLUTION ORAL 3 TIMES DAILY
Status: DISCONTINUED | OUTPATIENT
Start: 2022-01-13 | End: 2022-01-14

## 2022-01-13 RX ORDER — CYCLOSPORINE 100 MG/1
100 CAPSULE, GELATIN COATED ORAL 2 TIMES DAILY
Status: DISCONTINUED | OUTPATIENT
Start: 2022-01-13 | End: 2022-01-15

## 2022-01-13 RX ORDER — SODIUM CHLORIDE 9 MG/ML
INJECTION, SOLUTION INTRAVENOUS CONTINUOUS
Status: DISCONTINUED | OUTPATIENT
Start: 2022-01-13 | End: 2022-01-15

## 2022-01-13 RX ADMIN — IPRATROPIUM BROMIDE AND ALBUTEROL SULFATE 3 ML: .5; 3 SOLUTION RESPIRATORY (INHALATION) at 17:41

## 2022-01-13 RX ADMIN — POLYETHYLENE GLYCOL 3350 17 G: 17 POWDER, FOR SOLUTION ORAL at 16:11

## 2022-01-13 RX ADMIN — MYCOPHENOLATE MOFETIL 500 MG: 250 CAPSULE ORAL at 21:33

## 2022-01-13 RX ADMIN — ATORVASTATIN CALCIUM 10 MG: 10 TABLET, FILM COATED ORAL at 09:07

## 2022-01-13 RX ADMIN — DOCUSATE SODIUM 100 MG: 100 CAPSULE ORAL at 21:47

## 2022-01-13 RX ADMIN — FOLIC ACID 1 MG: 1 TABLET ORAL at 09:06

## 2022-01-13 RX ADMIN — LACTULOSE 20 G: 20 SOLUTION ORAL at 21:47

## 2022-01-13 RX ADMIN — SODIUM CHLORIDE: 9 INJECTION, SOLUTION INTRAVENOUS at 21:34

## 2022-01-13 RX ADMIN — HEPARIN SODIUM 5000 UNITS: 5000 INJECTION INTRAVENOUS; SUBCUTANEOUS at 21:34

## 2022-01-13 RX ADMIN — DESVENLAFAXINE SUCCINATE 50 MG: 50 TABLET, FILM COATED, EXTENDED RELEASE ORAL at 18:13

## 2022-01-13 RX ADMIN — ALLOPURINOL 100 MG: 100 TABLET ORAL at 09:07

## 2022-01-13 RX ADMIN — MYCOPHENOLATE MOFETIL 500 MG: 250 CAPSULE ORAL at 09:06

## 2022-01-13 RX ADMIN — PREDNISONE 5 MG: 5 TABLET ORAL at 09:06

## 2022-01-13 RX ADMIN — LEVOTHYROXINE SODIUM 50 MCG: 0.05 TABLET ORAL at 09:07

## 2022-01-13 RX ADMIN — METOPROLOL SUCCINATE 50 MG: 50 TABLET, EXTENDED RELEASE ORAL at 09:07

## 2022-01-13 RX ADMIN — BUDESONIDE 500 MCG: 0.5 INHALANT RESPIRATORY (INHALATION) at 17:42

## 2022-01-13 RX ADMIN — DOCUSATE SODIUM 100 MG: 100 CAPSULE ORAL at 09:06

## 2022-01-13 RX ADMIN — CYCLOSPORINE 100 MG: 100 CAPSULE, GELATIN COATED ORAL at 21:47

## 2022-01-13 RX ADMIN — HEPARIN 100 UNITS: 100 SYRINGE at 09:07

## 2022-01-13 RX ADMIN — CEFEPIME HYDROCHLORIDE 2000 MG: 2 INJECTION, POWDER, FOR SOLUTION INTRAVENOUS at 16:11

## 2022-01-13 RX ADMIN — Medication 10 ML: at 09:09

## 2022-01-13 RX ADMIN — HEPARIN SODIUM 5000 UNITS: 5000 INJECTION INTRAVENOUS; SUBCUTANEOUS at 09:07

## 2022-01-13 ASSESSMENT — PAIN SCALES - GENERAL
PAINLEVEL_OUTOF10: 0

## 2022-01-13 NOTE — PROGRESS NOTES
parathyroidectomy       Family History   Problem Relation Age of Onset    Diabetes Mother     Diabetes Father     Dementia Father         reports that she quit smoking about 6 years ago. She has a 20.00 pack-year smoking history. She has never used smokeless tobacco. She reports that she does not drink alcohol and does not use drugs. Review of Systems:   Constitutional: no fevers , no chills , feels ok   Eyes: no eye pain , no itching , no drainage  Ears, nose, mouth, throat, and face: no ear ,nose pain , hearing is ok ,no nasal drainage   Respiratory: no sob ,no cough ,no wheezing . Cardiovascular: no chest pain , no palpitation ,no sob . Gastrointestinal: no nausea, vomiting , constipation , no abdominal pain . Genitourinary:no urinary retention , no burning , dysuria . No polyuria   Hematologic/lymphatic: no bleeding , no cougulation issues . Musculoskeletal:no joint pain , no swelling . Neurological: no headaches ,no weakness , no numbness . Endocrine: no thirst , no weight issues .      MEDS (scheduled):    heparin (porcine)  5,000 Units SubCUTAneous BID    sodium chloride flush  5-40 mL IntraVENous 2 times per day    predniSONE  5 mg Oral Daily    mycophenolate  500 mg Oral BID    sodium chloride flush  5-40 mL IntraVENous 2 times per day    heparin flush  1 mL IntraVENous 2 times per day    cefepime  2,000 mg IntraVENous Q24H    allopurinol  100 mg Oral Daily    budesonide  500 mcg Nebulization BID    cloZAPine  50 mg Oral Nightly    desvenlafaxine succinate  50 mg Oral QPM    docusate sodium  100 mg Oral BID    folic acid  1 mg Oral Daily    levothyroxine  50 mcg Oral Daily    metoprolol succinate  50 mg Oral Daily    predniSONE  5 mg Oral Daily    atorvastatin  10 mg Oral Daily    ondansetron  4 mg Oral Once       MEDS (infusions):   sodium chloride      sodium chloride         MEDS (prn):  sodium chloride flush, sodium chloride, ondansetron **OR** ondansetron, polyethylene glycol, acetaminophen **OR** acetaminophen, sodium chloride flush, sodium chloride, heparin flush, ipratropium-albuterol    PHYSICAL EXAM:     Patient Vitals for the past 24 hrs:   BP Temp Temp src Pulse Resp SpO2   01/13/22 0807 (!) 115/54 98.6 °F (37 °C) Oral 92 19 94 %   01/13/22 0526 123/63 98.3 °F (36.8 °C) Oral 92 16 94 %   01/12/22 1830 126/62 98 °F (36.7 °C) Infrared 86 16 97 %   @      Intake/Output Summary (Last 24 hours) at 1/13/2022 1155  Last data filed at 1/13/2022 0618  Gross per 24 hour   Intake 0 ml   Output 975 ml   Net -975 ml         Wt Readings from Last 3 Encounters:   01/11/22 218 lb 12.8 oz (99.2 kg)   11/17/21 227 lb 3.2 oz (103.1 kg)   08/16/21 229 lb (103.9 kg)       Constitutional:  in no acute distress  Oral: mucus membranes moist  Neck: no JVD  Cardiovascular: S1, S2 regular rhythm, no murmur,or rub  Respiratory:  No crackles, no wheeze  Gastrointestinal:  Soft, nontender, nondistended, NABS  Ext: no edema, feet warm  Skin: dry, no rash  Neuro: awake, alert, interactive      DATA:    Recent Labs     01/10/22  2130 01/12/22  1250   WBC 17.9* 18.1*   HGB 11.2* 10.6*   HCT 35.9 36.4   .5* 106.4*    157     Recent Labs     01/11/22  1417 01/11/22  1417 01/11/22  1550 01/12/22  1250 01/13/22  0920      < > 142 140 140   K 4.2   < > 4.2 4.6 5.1*      < > 105 106 109*   CO2 22   < > 22 23 20*   MG 2.0  --   --  1.8 1.7   PHOS 3.3  --   --  4.1 3.5   BUN 28*   < > 26* 28* 25*   CREATININE 2.0*   < > 1.9* 1.8* 1.6*   ALT 26  --   --  25 19   AST 39*  --   --  29 20   BILITOT 1.0  --   --  0.9 0.7   ALKPHOS 138*  --   --  111* 117*    < > = values in this interval not displayed. No results found for: LABPROT    ASSESSMENT / RECOMMENDATIONS:      1. Acute kidney injury, felt to be related to volume depletion and  consists of diarrhea and poor p.o. intake. 2.  Chronic kidney disease, stage III. The patient with kidney  transplant.   Baseline creatinine 1.3.  3. Immunosuppressant host, chronically on cyclosporin 100 mg b.i.d.,  CellCept 1000 mg b.i.d., and prednisone 5 mg. 4.  Sepsis. Need to rule out a C. diff. infection as the patient is  reporting diarrhea. 5.  Obesity. 6.  Hypotension.     PLAN:    Cr improving   Confused and ammonia high . Started on lactulose      1. Continue current IV fluid in the form of normal saline at 60 mL an  hour. 2.  continue cyclosporin 100 mg b.i.d.  3.  continue prednisone 5 mg daily. cut CellCept to 500 mg bid  due to concern of sepsis . 5.  Hold lisinopril for now.   6.  We will follow along with you.       Electronically signed by Rosana Butterfield MD on 1/13/2022 at 11:55 AM

## 2022-01-13 NOTE — PROGRESS NOTES
Physical Therapy    Physical Therapy Treatment Note/Plan of Care    Room #:  0505/4862-04  Patient Name: Sandra Collins  YOB: 1955  MRN: [id number]    Date of Service: 1/13/2022     Tentative placement recommendation: Subacute vs Home Health Physical Therapy if patient meets goals  Equipment recommendation: Andie Ozuna      Evaluating Physical Therapist: Sandy Mcdonald PT  #[id number]      Specific Provider Orders/Date/Referring Provider :  01/11/22 0730   PT eval and treat Start: 01/11/22 0730, End: 01/11/22 0730, ONE TIME, Standing Count: 1 Occurrences, Mikala Levine MD      Admitting Diagnosis:   CECILY (acute kidney injury) ([facility] [address] [N17.9]     Admitted with  Abnormal labs, fatigue , nausea , vomiting   Surgery: none  Visit Diagnoses       Codes    Transplanted kidney     Z94.0          Patient Active Problem List   Diagnosis    Peripheral vascular disease ([address]    Depression    Clotting disorder ([address]    Abnormal EKG    Cancer ([address]    Over weight    S/p cadaver renal transplant    ESRD (end stage renal disease) ([address]    Non morbid obesity due to excess calories    Hypertension    Leg swelling    Lymphedema of both lower extremities    Acute gout involving toe of right foot    Sepsis secondary to UTI ([address]    Acute kidney injury superimposed on CKD ([address]    Thyroid disease    Acute on chronic combined systolic (congestive) and diastolic (congestive) heart failure ([address]    Sepsis ([address]    Acute renal failure ([address]    CECILY (acute kidney injury) ([address]        ASSESSMENT of Current Deficits Patient exhibits decreased strength, balance, endurance and coordination impairing functional mobility, transfers, gait , gait distance and tolerance to activity are barriers to d/c and require skilled intervention during hospital stay to attain pre hospital level of function.  Patient needing moderate assist for trunk flexion and bilateral lower extremities for bed mobility as well as for sitting balance due to left lateral lean throughout tx session. PHYSICAL THERAPY  PLAN OF CARE       Physical therapy plan of care is established based on physician order,  patient diagnosis and clinical assessment    Current Treatment Recommendations:    -Bed Mobility: Lower extremity exercises  and Trunk control activities   -Standing Balance: Perform strengthening exercises in standing to promote motor control with or without upper extremity support  and Instruct patient on adequate base of support to maintain balance  -Transfers: Provide instruction on proper hand and foot position for adequate transfer of weight onto lower extremities and use of gait device if needed and Cues for hand placement, technique and safety. Provide stabilization to prevent fall   -Gait: Gait training, Standing activities to improve: base of support, weight shift, weight bearing  and Exercises to improve hip and knee control   -Endurance: Utilize Supervised activities to increase level of endurance to allow for safe functional mobility including transfers and gait     PT long term treatment goals are located in below grid    Patient and or family understand(s) diagnosis, prognosis, and plan of care. Frequency of treatments: Patient will be seen  3-5 times/week.          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    Cerebrovascular disease     Clotting disorder ([facility] [address] 1985    thrombophlebitis after c section delivery    Depression     15 years followed by dr Zaida Valenzuela Hyperlipidemia  Hypertension     Hyperthyroidism     Leg swelling 9/3/2020    Lymphedema of both lower extremities 9/3/2020    Peripheral vascular disease ([address]     Renal failure 11/2012    renal transplant    Thrombophlebitis     after c section delivery    Thyroid disease      Past Surgical History:   Procedure Laterality Date   [address]    one normal delivery    COLONOSCOPY      DIAGNOSTIC CARDIAC CATH LAB PROCEDURE  2006    normal coronaries and 1996 normal coronaries    DIALYSIS FISTULA CREATION  march and july 2012    phase one and two patient will start dialysis when needed   [address]    transfemoral venous bypass grafts    KIDNEY TRANSPLANT  2016   [facility]  2015   [facility]  2015    PARATHYROIDECTOMY  2006    left parathyroid  implant and parathyroidectomy       SUBJECTIVE:    Precautions: Up with assistance, falls and alarm ,    Social history: Patient lives alone in a apartment    with Elevator  to enter     Tub shower grab bars    Equipment owned: Rollator and Shower chair,       [address] [place] [address] [address]kw[address]   How much difficulty turning over in bed?: A Lot  How much difficulty sitting down on / standing up from a chair with arms?: A Little  How much difficulty moving from lying on back to sitting on side of bed?: A Lot  How much help from another person moving to and from a bed to a chair?: A Little  How much help from another person needed to walk in hospital room?: A Lot  How much help from another person for climbing 3-5 steps with a railing?: A Lot  AM-PAC Inpatient Mobility Raw Score : 14  AM-PAC Inpatient T-Scale Score : 38.1  Mobility Inpatient CMS 0-100% Score: 61.29  Mobility Inpatient CMS G-Code Modifier : CL    Nursing cleared patient for PT treatment. .   OBJECTIVE;   Initial Evaluation  Date: 1/11/2022 Treatment Date:  1/13/2022       Short Term/ Long Term   Goals   Was pt agreeable to Eval/treatment?  Yes yes To be met in 3 days   Pain level   0/10    0/10    Bed Mobility    Rolling: Minimal assist of 1    Supine to sit: Moderate assist of 1    Sit to supine: Moderate assist of 1    Scooting: Minimal assist of 1   Rolling: Moderate assist of 1   Supine to sit: Moderate assist of 1   Sit to supine: Moderate assist of 1   Scooting: Moderate assist of 1    Rolling: Independent    Supine to sit: Independent    Sit to supine: Independent    Scooting: Independent     Transfers Sit to stand: Minimal assist of 1   Sit to stand: Not assessed       Sit to stand: Independent     Ambulation    35 feet using  wheeled walker with Moderate assist of 1   for one instance of knee buckle otherwise min a  Walker approximation and safety not assessed     100 feet using  least restrictive device versus no device with Independent    ROM Within functional limits        Strength BUE:  refer to OT eval  RLE:  3+/5  LLE:  3+/5  Increase strength in affected mm groups by 1/3 grade   Balance Sitting EOB:  fair    Dynamic Standing:  fair with wheeled walker  Sitting EOB: poor left lateral lean  Dynamic Standing: not assessed    Sitting EOB:  good    Dynamic Standing: good with wheeled walker      Patient is Alert & Oriented x person, place, time and situation and follows directions    Sensation:  Patient  denies numbness/tingling   Edema:  no   Endurance: fair          Patient education  Patient educated on role of Physical Therapy, risks of immobility, safety and plan of care,  importance of mobility while in hospital  and importance and purpose of adaptive device and adjusted to proper height for the patient.      Patient response to education:   Pt verbalized understanding Pt demonstrated skill Pt requires further education in this area   Yes Partial Yes      Treatment:  Patient practiced and was instructed/facilitated in the following treatment: Patient assisted to edge of bed, pt  Sat edge of bed 15 minutes with Moderate assist of 1 to increase dynamic sitting balance and activity tolerance. Worked on sitting balance due to left lateral lean. Pt having difficulty with staying awake. Pt stated she has her days and nights mixed up. Pt returned to supine and performed supine exercises. Therapeutic Exercises:  ankle pumps, heel slide, hip abduction/adduction and straight leg raise, x 15 reps. At end of session, patient in bed with  call light and phone within reach,  all lines and tubes intact, nursing notified. Patient would benefit from continued skilled Physical Therapy to improve functional independence and quality of life. Patient's/ family goals   home    Time in 2:40  Time out  3:04    Total Treatment Time  24 minutes        CPT codes:    Therapeutic activities (28343)   15 minutes  1 unit(s)  Therapeutic exercises (81908)   9 minutes  1 unit(s)    Antione Parra  [facility]  LIC # [id number]

## 2022-01-13 NOTE — CARE COORDINATION
1/13/22 1156 CM note: COVID (-) 1/11/22 . Hx renal transplant 2015. 2L nc. Patient admittedly with some confusion yesterday requested I call her son for any information I needed. Pt sleeping today. Called Pts son, Seema Crow, to further discuss discharge planning. Updated him that his first SNF choice, [facility], is following but they will not have any beds open until next week sometime. Mayur provided they following back up SNF choices: 2) [facility]- referral given to [facility] to review 3) [facility]. NO PRECERT NEEDED. WILL NEED SIGNED GINA AND DISCHARGE ORDER FOR CM TO COMPLETE HENS. CM will continue to follow for dc needs.  Electronically signed by Sneha Holder RN on 1/13/2022 at 12:01 PM

## 2022-01-13 NOTE — PROGRESS NOTES
[address] [place] [address] [facility]  Progress Note  CC: tired. Face to face encounter   SUBJECTIVE:  Patient is tolerating medications. No reported adverse drug reactions. ROS: No nausea, vomiting, diarrhea. No fevers. On 2 liters-  Lethargic in bed. Fevers better today     Medications:  Scheduled Meds:   heparin (porcine)  5,000 Units SubCUTAneous BID    sodium chloride flush  5-40 mL IntraVENous 2 times per day    predniSONE  5 mg Oral Daily    mycophenolate  500 mg Oral BID    sodium chloride flush  5-40 mL IntraVENous 2 times per day    heparin flush  1 mL IntraVENous 2 times per day    cefepime  2,000 mg IntraVENous Q24H    allopurinol  100 mg Oral Daily    budesonide  500 mcg Nebulization BID    cloZAPine  50 mg Oral Nightly    desvenlafaxine succinate  50 mg Oral QPM    docusate sodium  100 mg Oral BID    folic acid  1 mg Oral Daily    levothyroxine  50 mcg Oral Daily    metoprolol succinate  50 mg Oral Daily    predniSONE  5 mg Oral Daily    atorvastatin  10 mg Oral Daily    ondansetron  4 mg Oral Once     Continuous Infusions:   sodium chloride      sodium chloride       PRN Meds:sodium chloride flush, sodium chloride, ondansetron **OR** ondansetron, polyethylene glycol, acetaminophen **OR** acetaminophen, sodium chloride flush, sodium chloride, heparin flush, ipratropium-albuterol  OBJECTIVE:  Patient Vitals for the past 24 hrs:   BP Temp Temp src Pulse Resp SpO2   01/13/22 0807 (!) 115/54 98.6 °F (37 °C) Oral 92 19 94 %   01/13/22 0526 123/63 98.3 °F (36.8 °C) Oral 92 16 94 %   01/12/22 1830 126/62 98 °F (36.7 °C) Infrared 86 16 97 %     Constitutional: The patient is lethargic. Tired laying in bed. Skin: Warm and dry. No rashes were noted. Head: Eyes show round, and reactive pupils. No jaundice. Mouth: Moist mucous membranes, no ulcerations, no thrush. Neck: Supple to movements. No lymphadenopathy. Chest: No use of accessory muscles to breathe. Symmetrical expansion. Auscultation reveals diminished to bases. Cardiovascular: S1 and S2 are rhythmic and regular. Abdomen: Positive bowel sounds to auscultation. Distended- tympanic . Extremities: No clubbing, no cyanosis, no edema.   Chin     Laboratory and Tests Review:  Lab Results   Component Value Date    WBC 18.1 (H) 01/12/2022    WBC 17.9 (H) 01/10/2022    WBC 6.9 11/27/2021    HGB 10.6 (L) 01/12/2022    HCT 36.4 01/12/2022    .4 (H) 01/12/2022     01/12/2022     Lab Results   Component Value Date    NEUTROABS 16.47 (H) 01/12/2022    NEUTROABS 15.66 (H) 01/10/2022    NEUTROABS 4.93 11/27/2021     Lab Results   Component Value Date    CRP 34.6 (H) 11/17/2021     Lab Results   Component Value Date    SEDRATE 62 (H) 11/20/2021     Lab Results   Component Value Date    ALT 19 01/13/2022    AST 20 01/13/2022    ALKPHOS 117 (H) 01/13/2022    BILITOT 0.7 01/13/2022     Lab Results   Component Value Date     01/13/2022    K 5.1 01/13/2022    K 5.5 01/10/2022     01/13/2022    CO2 20 01/13/2022    BUN 25 01/13/2022    CREATININE 1.6 01/13/2022    GFRAA 39 01/13/2022    LABGLOM 32 01/13/2022    GLUCOSE 100 01/13/2022    PROT 5.6 01/13/2022    LABALBU 2.6 01/13/2022    CALCIUM 9.7 01/13/2022    BILITOT 0.7 01/13/2022    ALKPHOS 117 01/13/2022    AST 20 01/13/2022    ALT 19 01/13/2022     Radiology:  Reviewed     Microbiology:   Respiratory panel- negative   Blood cx- pending   COVID negative     ASSESSMENT:  · Immunocompromised patient- renal transplant  · Leukocytosis   · CECILY   · S/p treatment for UTI  · History of klebsiella and cons bacteremia     PLAN:  · Continue cefepime for now- day #3- will probably stop tomorrow once work-up is final    · Had vancomycin x1 on 1/11/2022  · On steroids   · Check AMMONIA, check fungitell   · Check KUB   · Check covid antibodies   · Check cultures  · Check EBV and CMV, IGG  · BzK virus  · Monitor labs- WBC- 18.1   · Check urine culture  · Random level- 10.7  procal- 0.14     KATINA Potts  11:27 AM  1/13/2022       As above       This is a face to face encounter with Koko Valenzuela on 01/13/22. I have performed and participated in the history, exam, medical decision making, and  POC with the NURSE PRACTITIONER, KATINA Potts. still lethargic afebrile this am  On 2L cr1.6 wbc 17.9   abd dsitedned but not painful    Check kub/nh3  Will stop atbx after today if cx neg     Has motility drugs  Add lactulose    Imaging and labs were reviewed. Koko Valenzuela was informed of their diagnosis, indications, risks and benefits of treatment. Koko Valenzuela had the opportunity to ask questions. All questions were answered. Thank you for involving me in the care of Koko Valenzuela. Please do not hesitate to call for any questions or concerns.     Electronically signed by Caprice Grey MD on 1/13/2022 at 6:16 PM

## 2022-01-13 NOTE — PLAN OF CARE
Problem: Skin Integrity:  Goal: Will show no infection signs and symptoms  Description: Will show no infection signs and symptoms  1/13/2022 1652 by Skip Carlos RN  Outcome: Met This Shift     Problem: Skin Integrity:  Goal: Absence of new skin breakdown  Description: Absence of new skin breakdown  1/13/2022 1652 by Skip Carlos RN  Outcome: Met This Shift     Problem: Falls - Risk of:  Goal: Will remain free from falls  Description: Will remain free from falls  1/13/2022 1652 by Skip Carlos RN  Outcome: Met This Shift     Problem: Falls - Risk of:  Goal: Absence of physical injury  Description: Absence of physical injury  1/13/2022 1652 by Skip Carlos RN  Outcome: Met This Shift

## 2022-01-13 NOTE — PROGRESS NOTES
Physical Therapy    0515/0515-02    Patient unavailable for physical therapy treatment due to being to tired. Олег Mcmillan.  Aida  [facility]  LIC # [id number]

## 2022-01-14 ENCOUNTER — APPOINTMENT (OUTPATIENT)
Dept: CT IMAGING | Age: 67
DRG: 673 | End: 2022-01-14
Payer: MEDICARE

## 2022-01-14 LAB
ALBUMIN SERPL-MCNC: 2.6 G/DL (ref 3.5–5.2)
ALP BLD-CCNC: 106 U/L (ref 35–104)
ALT SERPL-CCNC: 17 U/L (ref 0–32)
ANION GAP SERPL CALCULATED.3IONS-SCNC: 11 MMOL/L (ref 7–16)
ANISOCYTOSIS: ABNORMAL
AST SERPL-CCNC: 15 U/L (ref 0–31)
B.E.: -3.8 MMOL/L (ref -3–3)
BASOPHILS ABSOLUTE: 0.05 E9/L (ref 0–0.2)
BASOPHILS RELATIVE PERCENT: 0.3 % (ref 0–2)
BILIRUB SERPL-MCNC: 0.7 MG/DL (ref 0–1.2)
BUN BLDV-MCNC: 28 MG/DL (ref 6–23)
BURR CELLS: ABNORMAL
CALCIUM SERPL-MCNC: 10.5 MG/DL (ref 8.6–10.2)
CHLORIDE BLD-SCNC: 109 MMOL/L (ref 98–107)
CO2: 21 MMOL/L (ref 22–29)
COHB: 0.1 % (ref 0–1.5)
CREAT SERPL-MCNC: 1.4 MG/DL (ref 0.5–1)
CRITICAL: ABNORMAL
DATE ANALYZED: ABNORMAL
DATE OF COLLECTION: ABNORMAL
EOSINOPHILS ABSOLUTE: 0.05 E9/L (ref 0.05–0.5)
EOSINOPHILS RELATIVE PERCENT: 0.3 % (ref 0–6)
GFR AFRICAN AMERICAN: 46
GFR NON-AFRICAN AMERICAN: 38 ML/MIN/1.73
GLUCOSE BLD-MCNC: 114 MG/DL (ref 74–99)
HCO3: 22.1 MMOL/L (ref 22–26)
HCT VFR BLD CALC: 37.7 % (ref 34–48)
HEMOGLOBIN: 10.9 G/DL (ref 11.5–15.5)
HHB: 7.5 % (ref 0–5)
HYPOCHROMIA: ABNORMAL
IGA: 95 MG/DL (ref 70–400)
IGG: 535 MG/DL (ref 700–1600)
IGM: 70 MG/DL (ref 40–230)
IMMATURE GRANULOCYTES #: 0.31 E9/L
IMMATURE GRANULOCYTES %: 1.8 % (ref 0–5)
LAB: ABNORMAL
LACTIC ACID: 0.6 MMOL/L (ref 0.5–2.2)
LYMPHOCYTES ABSOLUTE: 0.34 E9/L (ref 1.5–4)
LYMPHOCYTES RELATIVE PERCENT: 2 % (ref 20–42)
Lab: ABNORMAL
MAGNESIUM: 1.7 MG/DL (ref 1.6–2.6)
MCH RBC QN AUTO: 31.1 PG (ref 26–35)
MCHC RBC AUTO-ENTMCNC: 28.9 % (ref 32–34.5)
MCV RBC AUTO: 107.4 FL (ref 80–99.9)
METHB: 0.3 % (ref 0–1.5)
MODE: ABNORMAL
MONOCYTES ABSOLUTE: 1.17 E9/L (ref 0.1–0.95)
MONOCYTES RELATIVE PERCENT: 6.9 % (ref 2–12)
NEUTROPHILS ABSOLUTE: 15.1 E9/L (ref 1.8–7.3)
NEUTROPHILS RELATIVE PERCENT: 88.7 % (ref 43–80)
O2 CONTENT: 15.7 ML/DL
O2 SATURATION: 92.5 % (ref 92–98.5)
O2HB: 92.1 % (ref 94–97)
OPERATOR ID: ABNORMAL
PATIENT TEMP: 37 C
PCO2: 43.3 MMHG (ref 35–45)
PDW BLD-RTO: 15.5 FL (ref 11.5–15)
PH BLOOD GAS: 7.33 (ref 7.35–7.45)
PHOSPHORUS: 3.2 MG/DL (ref 2.5–4.5)
PLATELET # BLD: 179 E9/L (ref 130–450)
PMV BLD AUTO: 12.9 FL (ref 7–12)
PO2: 65.4 MMHG (ref 75–100)
POIKILOCYTES: ABNORMAL
POLYCHROMASIA: ABNORMAL
POTASSIUM SERPL-SCNC: 4.8 MMOL/L (ref 3.5–5)
RBC # BLD: 3.51 E12/L (ref 3.5–5.5)
SODIUM BLD-SCNC: 141 MMOL/L (ref 132–146)
SOURCE, BLOOD GAS: ABNORMAL
THB: 12.1 G/DL (ref 11.5–16.5)
TIME ANALYZED: 1631
TOTAL PROTEIN: 5.8 G/DL (ref 6.4–8.3)
TOXIC GRANULATION: ABNORMAL
VACUOLATED NEUTROPHILS: ABNORMAL
WBC # BLD: 17 E9/L (ref 4.5–11.5)

## 2022-01-14 PROCEDURE — 6360000002 HC RX W HCPCS: Performed by: SPECIALIST

## 2022-01-14 PROCEDURE — 2580000003 HC RX 258: Performed by: SPECIALIST

## 2022-01-14 PROCEDURE — 36415 COLL VENOUS BLD VENIPUNCTURE: CPT

## 2022-01-14 PROCEDURE — 6360000002 HC RX W HCPCS: Performed by: INTERNAL MEDICINE

## 2022-01-14 PROCEDURE — 80053 COMPREHEN METABOLIC PANEL: CPT

## 2022-01-14 PROCEDURE — 2500000003 HC RX 250 WO HCPCS: Performed by: SPECIALIST

## 2022-01-14 PROCEDURE — 6370000000 HC RX 637 (ALT 250 FOR IP): Performed by: SPECIALIST

## 2022-01-14 PROCEDURE — 74176 CT ABD & PELVIS W/O CONTRAST: CPT

## 2022-01-14 PROCEDURE — 6360000004 HC RX CONTRAST MEDICATION: Performed by: RADIOLOGY

## 2022-01-14 PROCEDURE — 2700000000 HC OXYGEN THERAPY PER DAY

## 2022-01-14 PROCEDURE — 2060000000 HC ICU INTERMEDIATE R&B

## 2022-01-14 PROCEDURE — 85025 COMPLETE CBC W/AUTO DIFF WBC: CPT

## 2022-01-14 PROCEDURE — 2580000003 HC RX 258: Performed by: INTERNAL MEDICINE

## 2022-01-14 PROCEDURE — 83605 ASSAY OF LACTIC ACID: CPT

## 2022-01-14 PROCEDURE — 83735 ASSAY OF MAGNESIUM: CPT

## 2022-01-14 PROCEDURE — 6370000000 HC RX 637 (ALT 250 FOR IP): Performed by: INTERNAL MEDICINE

## 2022-01-14 PROCEDURE — 94640 AIRWAY INHALATION TREATMENT: CPT

## 2022-01-14 PROCEDURE — 36600 WITHDRAWAL OF ARTERIAL BLOOD: CPT

## 2022-01-14 PROCEDURE — 84100 ASSAY OF PHOSPHORUS: CPT

## 2022-01-14 PROCEDURE — 82805 BLOOD GASES W/O2 SATURATION: CPT

## 2022-01-14 RX ORDER — LINEZOLID 2 MG/ML
600 INJECTION, SOLUTION INTRAVENOUS EVERY 12 HOURS
Status: DISCONTINUED | OUTPATIENT
Start: 2022-01-14 | End: 2022-01-19

## 2022-01-14 RX ORDER — LINEZOLID 600 MG/1
600 TABLET, FILM COATED ORAL EVERY 12 HOURS SCHEDULED
Status: DISCONTINUED | OUTPATIENT
Start: 2022-01-14 | End: 2022-01-14

## 2022-01-14 RX ADMIN — LINEZOLID 600 MG: 600 INJECTION, SOLUTION INTRAVENOUS at 23:09

## 2022-01-14 RX ADMIN — HEPARIN SODIUM 5000 UNITS: 5000 INJECTION INTRAVENOUS; SUBCUTANEOUS at 22:01

## 2022-01-14 RX ADMIN — LACTULOSE 20 G: 20 SOLUTION ORAL at 15:11

## 2022-01-14 RX ADMIN — CEFEPIME HYDROCHLORIDE 2000 MG: 2 INJECTION, POWDER, FOR SOLUTION INTRAVENOUS at 16:45

## 2022-01-14 RX ADMIN — IOHEXOL 50 ML: 240 INJECTION, SOLUTION INTRATHECAL; INTRAVASCULAR; INTRAVENOUS; ORAL at 14:29

## 2022-01-14 RX ADMIN — HEPARIN SODIUM 5000 UNITS: 5000 INJECTION INTRAVENOUS; SUBCUTANEOUS at 13:19

## 2022-01-14 RX ADMIN — Medication 10 ML: at 22:02

## 2022-01-14 RX ADMIN — BUDESONIDE 500 MCG: 0.5 INHALANT RESPIRATORY (INHALATION) at 18:38

## 2022-01-14 RX ADMIN — METRONIDAZOLE 500 MG: 500 INJECTION, SOLUTION INTRAVENOUS at 22:01

## 2022-01-14 RX ADMIN — HEPARIN 100 UNITS: 100 SYRINGE at 22:02

## 2022-01-14 RX ADMIN — BUDESONIDE 500 MCG: 0.5 INHALANT RESPIRATORY (INHALATION) at 06:56

## 2022-01-14 RX ADMIN — IPRATROPIUM BROMIDE AND ALBUTEROL SULFATE 3 ML: .5; 3 SOLUTION RESPIRATORY (INHALATION) at 18:38

## 2022-01-14 RX ADMIN — Medication 10 ML: at 22:01

## 2022-01-14 RX ADMIN — SODIUM CHLORIDE: 9 INJECTION, SOLUTION INTRAVENOUS at 13:26

## 2022-01-14 ASSESSMENT — PAIN SCALES - GENERAL
PAINLEVEL_OUTOF10: 0

## 2022-01-14 NOTE — PROGRESS NOTES
Progress Note  Date:2022       Room:0515/0515-02  Patient Jian Wheeler     YOB: 1955     Age:66 y.o. Subjective    Subjective:  Symptoms:  Worsening. (Pt very lethargic  ). Diet:  Poor intake. Activity level: Impaired due to weakness. Pain:  She reports no pain. Review of Systems  Objective         Vitals Last 24 Hours:  TEMPERATURE:  Temp  Av.4 °F (36.9 °C)  Min: 97.2 °F (36.2 °C)  Max: 98.9 °F (37.2 °C)  RESPIRATIONS RANGE: Resp  Av.3  Min: 16  Max: 18  PULSE OXIMETRY RANGE: SpO2  Av.5 %  Min: 95 %  Max: 96 %  PULSE RANGE: Pulse  Av.8  Min: 86  Max: 103  BLOOD PRESSURE RANGE: Systolic (06WKU), FGZ:359 , Min:137 , BUH:682   ; Diastolic (78MLH), XOH:91, Min:72, Max:80    I/O (24Hr): Intake/Output Summary (Last 24 hours) at 2022 1530  Last data filed at 2022 0600  Gross per 24 hour   Intake 759.13 ml   Output 425 ml   Net 334.13 ml     Objective:  General Appearance: In no acute distress (drowzy , and just moans  ). Vital signs: (most recent): Blood pressure (!) 150/76, pulse 103, temperature 98.7 °F (37.1 °C), temperature source Temporal, resp. rate 16, height 5' 2\" (1.575 m), weight 218 lb 12.8 oz (99.2 kg), SpO2 96 %. Vital signs are normal.    Output: Producing urine. HEENT: Normal HEENT exam.    Lungs: There are decreased breath sounds. Heart: Normal rate. Regular rhythm. Positive for murmur. Abdomen: Abdomen is soft and distended. (Obese ). Bowel sounds are normal.   There is left upper quadrant tenderness. Extremities: (Edema +1 bilateral )  Neurological: (Very drowzy ). Pupils:  Pupils are equal, round, and reactive to light.       Labs/Imaging/Diagnostics    Labs:  CBC:  Recent Labs     22  1250 22  1529 22  0928   WBC 18.1* 17.9* 17.0*   RBC 3.42* 3.34* 3.51   HGB 10.6* 10.5* 10.9*   HCT 36.4 36.0 37.7   .4* 107.8* 107.4*   RDW 15.1* 15.4* 15.5*    167 179 CHEMISTRIES:  Recent Labs     01/12/22  1250 01/13/22  0920 01/14/22  0928    140 141   K 4.6 5.1* 4.8    109* 109*   CO2 23 20* 21*   BUN 28* 25* 28*   CREATININE 1.8* 1.6* 1.4*   GLUCOSE 133* 100* 114*   PHOS 4.1 3.5 3.2   MG 1.8 1.7 1.7     PT/INR:No results for input(s): PROTIME, INR in the last 72 hours. APTT:No results for input(s): APTT in the last 72 hours. LIVER PROFILE:  Recent Labs     01/12/22  1250 01/13/22  0920 01/14/22  0928   AST 29 20 15   ALT 25 19 17   BILITOT 0.9 0.7 0.7   ALKPHOS 111* 117* 106*       Imaging Last 24 Hours:  CT ABDOMEN PELVIS WO CONTRAST Additional Contrast? None    Result Date: 1/10/2022  EXAMINATION: CT OF THE ABDOMEN AND PELVIS WITHOUT CONTRAST 1/10/2022 6:56 pm TECHNIQUE: CT of the abdomen and pelvis was performed without the administration of intravenous contrast. Multiplanar reformatted images are provided for review. Dose modulation, iterative reconstruction, and/or weight based adjustment of the mA/kV was utilized to reduce the radiation dose to as low as reasonably achievable. COMPARISON: CT abdomen pelvis 11/17/2021 HISTORY: ORDERING SYSTEM PROVIDED HISTORY: evaluate for diarrhea TECHNOLOGIST PROVIDED HISTORY: Reason for exam:->evaluate for diarrhea Additional Contrast?->None Decision Support Exception - unselect if not a suspected or confirmed emergency medical condition->Emergency Medical Condition (MA) FINDINGS: Lower Chest: Atherosclerotic disease. Atrophic kidneys with multiple cysts. Some of the presumed cysts appear hyperdense. Subcentimeter foci in the kidneys are too small to accurately characterize. There is a transplant kidney in the right lower quadrant. There is no hydronephrosis of the transplant kidney. Decreased perinephric stranding above the transplant kidney. Unremarkable bladder. Unremarkable appearance of the reproductive organs. No abdominal aortic aneurysm. Atherosclerotic disease.  No evidence of cholecystitis or pancreatitis. No free air or significant free fluid. No evidence of bowel obstruction. Nondilated appendix. No significant fluid within the large bowel. Small fat containing umbilical hernia. Mild degenerative changes of the spine. Presumed surgical scars in the anterior abdominal wall. No significant fluid within the large bowel. No hydronephrosis of the transplant kidneys. The native kidneys are atrophic and have multiple intermediate density cysts which should be further evaluated with nonemergent ultrasound. Atherosclerotic disease. XR CHEST PORTABLE    Result Date: 1/11/2022  EXAMINATION: ONE XRAY VIEW OF THE CHEST 1/11/2022 9:11 am COMPARISON: None. HISTORY: ORDERING SYSTEM PROVIDED HISTORY: shortness of breath TECHNOLOGIST PROVIDED HISTORY: Reason for exam:->shortness of breath FINDINGS: The heart is enlarged. There are no findings of failure. The lungs are clear. There is no focal infiltrate or effusion. 1. Stable cardiomegaly. There is no evidence of failure or pneumonia. Assessment//Plan           Hospital Problems           Last Modified POA    CECILY (acute kidney injury) ([facility] 1/11/2022 Yes        Assessment:    Condition: In stable condition. Improving. (Sepsis immunocompromised host , on cefepime and vanc  X 1 dose afebrile   Blood culture negative  , urine negative no infection ,  resp film array negative ,  ? Source , but has been afebrile , but still  Very lethargic ,   Ct scan of the abdomen ? Enteritis , colonic distention r/o ischemic bowel , consult surgery , to evaluate for ischemic bowel , lactic acid normal     Somnolence , and delirium ?  Sepsis r/o ischemic bowel     Leukocytosis secondary to above ,      Acute on chronic renal failure , renal function better with hydration , d/c IV fluids creatinine upon admission was 2.5 , and today 1.6 , and baseline 1.2   S/p renal transplant in the past ,     HTN bp optimal  On metorpolol    Copd on aerosol treatment as needed Bipolar controlled on  pristiq , hold clozaril    Hypothyroidism continue synthroid     called and discussed with son Elisabet Bunn , if she is still drowzy and not responding well , to treatment  Will transfer to [facility]         ).        Electronically signed by Zara Nugent MD on 1/12/22 at 6:29 PM EST

## 2022-01-14 NOTE — CARE COORDINATION
LVM for son Serge Dose explaining IMM letter and requested a call back. Electronically signed by Suzann Lennox on 1/14/2022 at 9:23 AM

## 2022-01-14 NOTE — DISCHARGE INSTR - COC
Continuity of Care Form    Patient Name: Jorge Alberto Bowens   :  1955  MRN:  [id number]    Admit date:  1/10/2022  Discharge date:  ***    Code Status Order: Full Code   Advance Directives:      Admitting Physician:  Merlyn Holly MD  PCP: Merlyn Holly MD    Discharging Nurse: [facility] Unit/Room#: 5159/4858-83  Discharging Unit Phone Number: ***    Emergency Contact:   Extended Emergency Contact Information  Primary Emergency Contact: Xuan Mazariegos  Home Phone: [phone]  Relation: Other   needed? No  Secondary Emergency Contact: Mayur Cheema   [address] Phone: [phone]  Mobile Phone: [phone]  Relation: Child   needed? No    Past Surgical History:  Past Surgical History:   Procedure Laterality Date     SECTION  1985    one normal delivery    COLONOSCOPY      DIAGNOSTIC CARDIAC CATH LAB PROCEDURE      normal coronaries and  normal coronaries    DIALYSIS FISTULA CREATION  march and 2012    phase one and two patient will start dialysis when needed    Racheal Romero 20    transfemoral venous bypass grafts    KIDNEY TRANSPLANT  2016    KIDNEY TRANSPLANT  2015    KIDNEY TRANSPLANT  2015    PARATHYROIDECTOMY  2006    left parathyroid  implant and parathyroidectomy       Immunization History:   Immunization History   Administered Date(s) Administered    COVID-19, JOHN Rubi, 30mcg/0.3mL 2021, 2021, 2021       Active Problems:  Patient Active Problem List   Diagnosis Code    [facility] I73.9    Depression F32. A    Clotting disorder (HCC) D68.9    Abnormal EKG R94.31    Cancer (HCC) C80.1    Over weight E66.3    S/p cadaver renal transplant Z94.0    ESRD (end stage renal disease) ([facility] N18.6    Non morbid obesity due to excess calories E66.09    Hypertension I10    Leg swelling M79.89    Lymphedema of both lower extremities I89.0    Acute gout involving toe of right foot M10.9 Sepsis secondary to UTI ([facility] A41.9, N39.0    Acute kidney injury superimposed on CKD ([facility] N17.9, N18.9    Thyroid disease E07.9    Acute on chronic combined systolic (congestive) and diastolic (congestive) heart failure (HCC) I50.43    Sepsis (HCC) A41.9    Acute renal failure ([facility] N17.9    [facility] N17.9       Isolation/Infection:   Isolation            No Isolation          Patient Infection Status       Infection Onset Added Last Indicated Last Indicated By Review Planned Expiration Resolved Resolved By    None active    Resolved    COVID-19 (Rule Out) 22 Respiratory Panel, Molecular, with COVID-19 (Restricted: peds pts or suitable admitted adults) (Ordered)   22 Rule-Out Test Resulted    COVID-19 (Rule Out) 01/10/22 01/10/22 01/10/22 COVID-19, Rapid (Ordered)   01/10/22 Rule-Out Test Resulted    COVID-19 (Rule Out) 21 Respiratory Panel, Molecular, with COVID-19 (Restricted: peds pts or suitable admitted adults) (Ordered)   21 Rule-Out Test Resulted    COVID-19 (Rule Out) 21 COVID-19, Rapid (Ordered)   21 Rule-Out Test Resulted            Nurse Assessment:  Last Vital Signs: BP (!) 150/76   Pulse 103   Temp 98.7 °F (37.1 °C) (Temporal)   Resp 16   Ht 5' 2\" (1.575 m)   Wt 218 lb 12.8 oz (99.2 kg)   SpO2 96%   BMI 40.02 kg/m²     Last documented pain score (0-10 scale): Pain Level: 0  Last Weight:   Wt Readings from Last 1 Encounters:   22 218 lb 12.8 oz (99.2 kg)     Mental Status:  {IP PT MENTAL STATUS:}    IV Access:  {[facility] [place]ESS:854512082}    Nursing Mobility/ADLs:  Walking   {[facility] DME BSEE:661977798}  Transfer  {[facility] DME GKGB:708196495}  Bathing  {[facility] DME JDOH:020068412}  Dressing  {[facility] DME DGVE:102858229}  Toileting  {[facility] DME HFXO:438591648}  Feeding  {[facility] DME PXEV:916665228}  Med Admin  {[facility] DME BRI}  Med Delivery   { GINA MED Delivery:422174854}    Wound Care Documentation and Therapy:        Elimination:  Continence: Bowel: {YES / EL:94819}  Bladder: {YES / YD:63675}  Urinary Catheter: {Urinary Catheter:377040128}   Colostomy/Ileostomy/Ileal Conduit: {YES / HV:69281}       Date of Last BM: ***    Intake/Output Summary (Last 24 hours) at 2022 1416  Last data filed at 2022 0600  Gross per 24 hour   Intake 759.13 ml   Output 425 ml   Net 334.13 ml     I/O last 3 completed shifts: In: 999.1 [P.O.:300; I.V.:648.5; IV Piggyback:50.6]  Out: 1250 [Urine:1250]    Safety Concerns:     { GINA Safety Concerns:152189435}    Impairments/Disabilities:      { GINA Impairments/Disabilities:625230304}    Nutrition Therapy:  Current Nutrition Therapy:   [facility] GINA Diet List:119830685}    Routes of Feeding: {[facility] DME Other Feedings:471919200}  Liquids: {Slp liquid thickness:61990}  Daily Fluid Restriction: {[facility] DME Yes amt example:693652450}  Last Modified Barium Swallow with Video (Video Swallowing Test): {Done Not Done PBVU:842745441}    Treatments at the Time of Hospital Discharge:   Respiratory Treatments: ***  Oxygen Therapy:  {Therapy; copd oxygen:45267}  Ventilator:    {[facility] Vent LLAC:251502704}    Rehab Therapies: {THERAPEUTIC INTERVENTION:3244700220}  Weight Bearing Status/Restrictions: [address] [place] [address] Weight Bearin}  Other Medical Equipment (for information only, NOT a DME order):  {EQUIPMENT:797144572}  Other Treatments: ***    Patient's personal belongings (please select all that are sent with patient):  {[facility] DME Belongings:817639760}    RN SIGNATURE:  {Esignature:981715018}    CASE MANAGEMENT/SOCIAL WORK SECTION    Inpatient Status Date: 1/10/22    Readmission Risk Assessment Score:  Readmission Risk              Risk of Unplanned Readmission:  32           Discharging to Facility/ Agency   Name: Henok Lomeli  Address: [address] [place] [address] [address]  Phone: [phone]  Fax: [phone]    Dialysis Facility (if applicable) Name:  Address:  Dialysis Schedule:  Phone:  Fax:    / signature: Electronically signed by Jess Kahn RN on 1/14/2022 at 2:17 PM      PHYSICIAN SECTION    Prognosis: Fair    Condition at Discharge: Stable    Rehab Potential (if transferring to Rehab): Good    Recommended Labs or Other Treatments After Discharge: ***    Physician Certification: I certify the above information and transfer of Spenser Dolan  is necessary for the continuing treatment of the diagnosis listed and that she requires [facility] for {GREATER/LESS:968454022} 30 days. Update Admission H&P: {[facility]P DME Changes in [facility]:535980560}    PHYSICIAN SIGNATURE:  {Esignature:033961367}      ***HEART FAILURE - CONGESTIVE HEART FAILURE***  DISCHARGE INSTRUCTIONS:  GUIDELINES TO FOLLOW AT [facility]/SNF/ Assisted Living    Future Appointments   Date Time Provider Gabriel Kelley   1/25/2022  8:00 AM [facility] CHF ROOM 1 [facility]          MEDICATIONS:  Please notify the doctor if patient is not able to take their medications or if medications are being held for any reasons (such as low blood pressure ect.)  Do not give the patient ibuprofen (Advil or Motrin), naproxen (Aleve) without talking to the doctor first. This could make their heart failure worse.          WEIGHT MONITORING:   Weigh patient every day in the morning after they void (If patient is able to stand, please get a standing weight.)   Notify the doctor of a weight gain of 3 pounds or more in 1 day   OR  a total of 5 pounds or more in 1 week             DIET   Cardiac heart healthy diet:  Low sodium diet: no  more than 2,000mg (2 grams) of salt / sodium per day (which equals to a little less than  a teaspoon of salt)/ Cardiac Diet: Low saturated / low trans fat, no added salt, caffeine restricted    If patient is there for rehab and will be returning home in the near future; reinforce with the patient and the family to follow a low sodium diet (2,000 mg)- avoid using salt at the table, avoid / limit use of canned soups, processed / packaged foods, salted snacks, olives and pickles. Do not use a salt substitute without checking with the doctor. (Mrs. Severa Skill is safe to use).        NOTIFY THE DOCTOR THE FIRST DAY OF ONSET OF ANY OF THESE   SYMPTOMS:   Weight gain of 3 pounds or more in 1 day         OR 5 pounds or more in one week  More shortness of breath  More swelling in stomach, legs, ankles or feet  Feeling more tired, No energy  Dry hacky cough  Dizziness  More chest pain / discomfort  Hard time breathing laying down

## 2022-01-14 NOTE — PROGRESS NOTES
Speech Language Pathology      NAME:  Shawn Warren  :  1955  DATE: 2022  ROOM:  [address] [place] [address]-    Attempted ongoing Speech-Language Pathology intervention for dysphagia therapy. Pt unavailable at this time due to:  [] HOLD per RN/ medical staff d/t medical status   [] Off unit for testing/ procedure    [] With medical staff   [] Declined intervention  [] Sleeping/ Lethargic   [x] Other: NPO for CT     SLP to continue previously established POC and re-attempt as able.             CECILY (acute kidney injury) ([facility] [N17.9]        Spencer Yeung MSCCC/SLP  Speech Language Pathologist  [id number] normal...

## 2022-01-14 NOTE — CARE COORDINATION
1/14/2022 CM note: COVID (-) 1/11/22. Hx renal transplant 2015. 2L nc. Per ANN Gambino [facility] liaison, they can accept patient when medically stable to discharge. NO PRECERT NEEDED. WILL NEED UPDATED THERAPY NOTES, SIGNED GINA, RAPID COVID TEST ON DAY OF DISCHARGE, AND DISCHARGE ORDER FOR CM/SW TO COMPLETE HENS. Updated pts son, Edward Pimentel, on above.  Electronically signed by Abimbola Gutierrez RN on 1/14/2022 at 2:13 PM

## 2022-01-14 NOTE — PROGRESS NOTES
Progress Note  Date:2022       Room:0515/0515-02  Patient Lamar Patterson     YOB: 1955     Age:66 y.o. Subjective    Subjective:  Symptoms:  Improved. (Feeling a little better , but  She is weak and lethargic ). Diet:  Poor intake. Activity level: Impaired due to weakness. Pain:  She reports no pain. Review of Systems  Objective         Vitals Last 24 Hours:  TEMPERATURE:  Temp  Av.6 °F (37 °C)  Min: 98.3 °F (36.8 °C)  Max: 98.8 °F (37.1 °C)  RESPIRATIONS RANGE: Resp  Av.3  Min: 16  Max: 19  PULSE OXIMETRY RANGE: SpO2  Av.3 %  Min: 94 %  Max: 95 %  PULSE RANGE: Pulse  Av  Min: 86  Max: 92  BLOOD PRESSURE RANGE: Systolic (32HWQ), TKX:275 , Min:115 , NQK:422   ; Diastolic (42LST), BBE:04, Min:54, Max:72    I/O (24Hr): Intake/Output Summary (Last 24 hours) at 2022 1904  Last data filed at 2022 1400  Gross per 24 hour   Intake 240 ml   Output 825 ml   Net -585 ml     Objective:  General Appearance: In no acute distress (llethargic , but awake and answers appropriatly ). Vital signs: (most recent): Blood pressure 137/72, pulse 86, temperature 98.8 °F (37.1 °C), temperature source Axillary, resp. rate 17, height 5' 2\" (1.575 m), weight 218 lb 12.8 oz (99.2 kg), SpO2 95 %. Vital signs are normal.    Output: Producing urine. HEENT: Normal HEENT exam.    Lungs: There are decreased breath sounds. Heart: Normal rate. Regular rhythm. Positive for murmur. Abdomen: Abdomen is soft and distended. (Obese ). Bowel sounds are normal.   There is no abdominal tenderness. Extremities: (Edema +1 bilateral )  Neurological: Patient is alert and oriented to person, place and time.       Labs/Imaging/Diagnostics    Labs:  CBC:  Recent Labs     01/10/22  2130 22  1250 22  1529   WBC 17.9* 18.1* 17.9*   RBC 3.47* 3.42* 3.34*   HGB 11.2* 10.6* 10.5*   HCT 35.9 36.4 36.0   .5* 106.4* 107.8*   RDW 15.9* 15.1* 15.4*   PLT 183 157 167     CHEMISTRIES:  Recent Labs     01/11/22  1417 01/11/22  1417 01/11/22  1550 01/12/22  1250 01/13/22  0920      < > 142 140 140   K 4.2   < > 4.2 4.6 5.1*      < > 105 106 109*   CO2 22   < > 22 23 20*   BUN 28*   < > 26* 28* 25*   CREATININE 2.0*   < > 1.9* 1.8* 1.6*   GLUCOSE 129*   < > 97 133* 100*   PHOS 3.3  --   --  4.1 3.5   MG 2.0  --   --  1.8 1.7    < > = values in this interval not displayed. PT/INR:No results for input(s): PROTIME, INR in the last 72 hours. APTT:No results for input(s): APTT in the last 72 hours. LIVER PROFILE:  Recent Labs     01/11/22  1417 01/12/22  1250 01/13/22  0920   AST 39* 29 20   ALT 26 25 19   BILITOT 1.0 0.9 0.7   ALKPHOS 138* 111* 117*       Imaging Last 24 Hours:  CT ABDOMEN PELVIS WO CONTRAST Additional Contrast? None    Result Date: 1/10/2022  EXAMINATION: CT OF THE ABDOMEN AND PELVIS WITHOUT CONTRAST 1/10/2022 6:56 pm TECHNIQUE: CT of the abdomen and pelvis was performed without the administration of intravenous contrast. Multiplanar reformatted images are provided for review. Dose modulation, iterative reconstruction, and/or weight based adjustment of the mA/kV was utilized to reduce the radiation dose to as low as reasonably achievable. COMPARISON: CT abdomen pelvis 11/17/2021 HISTORY: ORDERING SYSTEM PROVIDED HISTORY: evaluate for diarrhea TECHNOLOGIST PROVIDED HISTORY: Reason for exam:->evaluate for diarrhea Additional Contrast?->None Decision Support Exception - unselect if not a suspected or confirmed emergency medical condition->Emergency Medical Condition (MA) FINDINGS: Lower Chest: Atherosclerotic disease. Atrophic kidneys with multiple cysts. Some of the presumed cysts appear hyperdense. Subcentimeter foci in the kidneys are too small to accurately characterize. There is a transplant kidney in the right lower quadrant. There is no hydronephrosis of the transplant kidney.   Decreased perinephric stranding above the transplant kidney. Unremarkable bladder. Unremarkable appearance of the reproductive organs. No abdominal aortic aneurysm. Atherosclerotic disease. No evidence of cholecystitis or pancreatitis. No free air or significant free fluid. No evidence of bowel obstruction. Nondilated appendix. No significant fluid within the large bowel. Small fat containing umbilical hernia. Mild degenerative changes of the spine. Presumed surgical scars in the anterior abdominal wall. No significant fluid within the large bowel. No hydronephrosis of the transplant kidneys. The native kidneys are atrophic and have multiple intermediate density cysts which should be further evaluated with nonemergent ultrasound. Atherosclerotic disease. XR CHEST PORTABLE    Result Date: 1/11/2022  EXAMINATION: ONE XRAY VIEW OF THE CHEST 1/11/2022 9:11 am COMPARISON: None. HISTORY: ORDERING SYSTEM PROVIDED HISTORY: shortness of breath TECHNOLOGIST PROVIDED HISTORY: Reason for exam:->shortness of breath FINDINGS: The heart is enlarged. There are no findings of failure. The lungs are clear. There is no focal infiltrate or effusion. 1. Stable cardiomegaly. There is no evidence of failure or pneumonia. Assessment//Plan           Hospital Problems           Last Modified POA    CECILY (acute kidney injury) ([facility] [address] 1/11/2022 Yes        Assessment:    Condition: In stable condition. Improving. (Sepsis immunocompromised host , on cefepime and vanc  X 1 dose afebrile   Blood culture pending , urine negative no infection ,  resp film array negative ,  ?  Source , but has been afebrile , but still lethargic     Leukocytosis secondary to above ,      Acute on chronic renal failure , renal function better with hydration , d/c IV fluids creatinine upon admission was 2.5 , and today 1.6 , and baseline 1.2   S/p renal transplant in the past ,     HTN bp optimal  On metorpolol    Copd on aerosol treatment as needed     Bipolar controlled on clozaril and pristiq Hypothyroidism continue synthroid               ).       Electronically signed by William Hernandez MD on 1/12/22 at 6:29 PM EST

## 2022-01-14 NOTE — PROGRESS NOTES
[facility] MD Elizabeth Ivan MD. Odalys Tipton MD   Progress Note    2022    SUBJECTIVE:   :Seen in her room , lying flat on o2/nc , euvolemic , confused   D/w RN on floor   : Patient was seen, interviewed and examined in her room, overall feeling much better with less confusion today. Discussed the case in detail with the nurse, patient had history of kidney transplant in , has maintained of 2 medications including cyclosporine and MMF. We will check with lab to obtain values both medications random throughout toxicity levels that could be responsible for her confusion at one point or another. PROBLEM LIST:    Active Problems:    CECILY (acute kidney injury) ([address]  Resolved Problems:    * No resolved hospital problems.  *       DIET:    Diet NPO       Allergies : Macrodantin [nitrofurantoin macrocrystal] and Sulfa antibiotics    Past Medical History:   Diagnosis Date    Abnormal EKG     left bundle branch block    Acute gout involving toe of right foot 9/3/2020    Acute gout involving toe of right foot 9/3/2020    Acute on chronic combined systolic (congestive) and diastolic (congestive) heart failure (HCC)     Cancer (HCC)     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    Cerebrovascular disease     Clotting disorder ([address] 1985    thrombophlebitis after c section delivery    Depression     15 years followed by dr Vincent Barnes Hyperlipidemia     Hypertension     Hyperthyroidism     Leg swelling 9/3/2020    Lymphedema of both lower extremities 9/3/2020    Peripheral vascular disease ([address]     Renal failure 2012    renal transplant    Thrombophlebitis     after c section delivery    Thyroid disease        Past Surgical History:   Procedure Laterality Date     SECTION  1985    one normal delivery    COLONOSCOPY      DIAGNOSTIC CARDIAC CATH LAB PROCEDURE      normal coronaries and  normal coronaries    DIALYSIS FISTULA CREATION  march and july 2012    phase one and two patient will start dialysis when needed   [address]    transfemoral venous bypass grafts    KIDNEY TRANSPLANT  2016    KIDNEY TRANSPLANT  2015    KIDNEY TRANSPLANT  2015    PARATHYROIDECTOMY  2006    left parathyroid  implant and parathyroidectomy       Family History   Problem Relation Age of Onset    Diabetes Mother     Diabetes Father     Dementia Father         reports that she quit smoking about 6 years ago. She has a 20.00 pack-year smoking history. She has never used smokeless tobacco. She reports that she does not drink alcohol and does not use drugs. Review of Systems:   Constitutional: no fevers , no chills , feels ok   Eyes: no eye pain , no itching , no drainage  Ears, nose, mouth, throat, and face: no ear ,nose pain , hearing is ok ,no nasal drainage   Respiratory: no sob ,no cough ,no wheezing . Cardiovascular: no chest pain , no palpitation ,no sob . Gastrointestinal: no nausea, vomiting , constipation , no abdominal pain . Genitourinary:no urinary retention , no burning , dysuria . No polyuria   Hematologic/lymphatic: no bleeding , no cougulation issues . Musculoskeletal:no joint pain , no swelling . Neurological: no headaches ,no weakness , no numbness . Endocrine: no thirst , no weight issues .      MEDS (scheduled):    lactulose  20 g Oral TID    cycloSPORINE  100 mg Oral BID    heparin (porcine)  5,000 Units SubCUTAneous BID    sodium chloride flush  5-40 mL IntraVENous 2 times per day    predniSONE  5 mg Oral Daily    mycophenolate  500 mg Oral BID    sodium chloride flush  5-40 mL IntraVENous 2 times per day    heparin flush  1 mL IntraVENous 2 times per day    cefepime  2,000 mg IntraVENous Q24H    allopurinol  100 mg Oral Daily    budesonide  500 mcg Nebulization BID    desvenlafaxine succinate  50 mg Oral QPM    docusate sodium  100 mg Oral BID    folic acid  1 mg Oral Daily    levothyroxine 50 mcg Oral Daily    metoprolol succinate  50 mg Oral Daily    atorvastatin  10 mg Oral Daily    ondansetron  4 mg Oral Once       MEDS (infusions):   sodium chloride 75 mL/hr at 01/13/22 2134    sodium chloride      sodium chloride         MEDS (prn):  sodium chloride flush, sodium chloride, ondansetron **OR** ondansetron, polyethylene glycol, acetaminophen **OR** acetaminophen, sodium chloride flush, sodium chloride, heparin flush, ipratropium-albuterol    PHYSICAL EXAM:     Patient Vitals for the past 24 hrs:   BP Temp Temp src Pulse Resp SpO2   01/14/22 0910 (!) 150/76 98.7 °F (37.1 °C) Temporal 103 16 96 %   01/14/22 0600 138/80 97.2 °F (36.2 °C) Temporal 102 18 95 %   01/13/22 2040 (!) 148/73 98.9 °F (37.2 °C) Temporal 96 18 96 %   01/13/22 1606 137/72 98.8 °F (37.1 °C) Axillary 86 17 95 %   @      Intake/Output Summary (Last 24 hours) at 1/14/2022 1238  Last data filed at 1/14/2022 0600  Gross per 24 hour   Intake 759.13 ml   Output 725 ml   Net 34.13 ml         Wt Readings from Last 3 Encounters:   01/11/22 218 lb 12.8 oz (99.2 kg)   11/17/21 227 lb 3.2 oz (103.1 kg)   08/16/21 229 lb (103.9 kg)       Constitutional:  in no acute distress  Oral: mucus membranes moist  Neck: no JVD  Cardiovascular: S1, S2 regular rhythm, no murmur,or rub  Respiratory:  No crackles, no wheeze  Gastrointestinal:  Soft, nontender, nondistended, NABS  Ext: no edema, feet warm  Skin: dry, no rash  Neuro: awake, alert, interactive      DATA:    Recent Labs     01/12/22  1250 01/13/22  1529 01/14/22  0928   WBC 18.1* 17.9* 17.0*   HGB 10.6* 10.5* 10.9*   HCT 36.4 36.0 37.7   .4* 107.8* 107.4*    167 179     Recent Labs     01/12/22  1250 01/13/22  0920 01/14/22  0928    140 141   K 4.6 5.1* 4.8    109* 109*   CO2 23 20* 21*   MG 1.8 1.7 1.7   PHOS 4.1 3.5 3.2   BUN 28* 25* 28*   CREATININE 1.8* 1.6* 1.4*   ALT 25 19 17   AST 29 20 15   BILITOT 0.9 0.7 0.7   ALKPHOS 111* 117* 106*       No results found for: LABPROT    ASSESSMENT / RECOMMENDATIONS:      1. Acute kidney injury, felt to be related to volume depletion and  consists of diarrhea and poor p.o. intake. CECILY resolving with BUN today of 28 and creatinine down to 1.4.  2.  Chronic kidney disease, stage III. The patient with kidney  transplant. Baseline creatinine 1.3.  3. Immunosuppressant host, chronically on cyclosporin 100 mg b.i.d.,  CellCept 1000 mg b.i.d., and prednisone 5 mg. 4.  Sepsis. Need to rule out a C. diff. infection as the patient is  reporting diarrhea. 5.  Obesity. 6.  Hypotension.     PLAN:    Cr improving   Confused and ammonia high . Started on lactulose  Improving confusion, scheduled for CAT scan of the abdomen this afternoon    1. Continue current IV fluid in the form of normal saline at 60 mL an  hour. 2.  continue cyclosporin 100 mg b.i.d.  3.  continue prednisone 5 mg daily. cut CellCept to 500 mg bid  due to concern of sepsis . 5.  Hold lisinopril for now. 6.  We will follow along with you. Will be rounding on the patient on daily basis and reviewing labs and all the consultants input.     Electronically signed by Angel North MD on 1/14/2022 at 12:38 PM

## 2022-01-14 NOTE — PLAN OF CARE
Problem: Skin Integrity:  Goal: Will show no infection signs and symptoms  Description: Will show no infection signs and symptoms  1/14/2022 0330 by Geoff Lassiter RN  Outcome: Met This Shift     Problem: Skin Integrity:  Goal: Absence of new skin breakdown  Description: Absence of new skin breakdown  1/14/2022 0330 by Geoff Lassiter RN  Outcome: Met This Shift     Problem: Falls - Risk of:  Goal: Will remain free from falls  Description: Will remain free from falls  1/14/2022 0330 by Geoff Lassiter RN  Outcome: Met This Shift     Problem: Falls - Risk of:  Goal: Absence of physical injury  Description: Absence of physical injury  1/14/2022 0330 by Geoff Lassiter RN  Outcome: Met This Shift

## 2022-01-14 NOTE — PROGRESS NOTES
[facility]  Consult Note    [address] [address]  [place], [address]  Phone [phone]   [phone]      Admit Date: 1/10/2022  6:12 PM  Pt Name: Dax Charles  MRN: [id number]  : 1955  Reason for Consult:    Chief Complaint   Patient presents with    Fatigue     patient attempted to go to pcp today and didnt have the strength. came here instead. patient states sepsis prior to thanksgiving requiring admission and rehab. Requesting Physician:  Emily Avina MD  PCP: Emily Avina MD  History Obtained From:  patient, chart   ID consulted for CECILY (acute kidney injury) ([address] [N17.9]  on hospital day [address] [place]       Chief Complaint   Patient presents with    Fatigue     patient attempted to go to pcp today and didnt have the strength. came here instead. patient states sepsis prior to thanksgiving requiring admission and rehab. HISTORYOF PRESENT ILLNESS   Dax Charles is a 73 y.o. female who presents with   has a past medical history of Abnormal EKG, Acute gout involving toe of right foot, Acute gout involving toe of right foot, Acute on chronic combined systolic (congestive) and diastolic (congestive) heart failure ([address] Cancer ([address] Cerebrovascular disease, Clotting disorder ([address] Depression, Hyperlipidemia, Hypertension, Hyperthyroidism, Leg swelling, Lymphedema of both lower extremities, Peripheral vascular disease ([address] Renal failure, Thrombophlebitis, and Thyroid disease.    ED TRIAGEVITALS  BP: 131/78, Temp: 98.4 °F (36.9 °C), Pulse: 108, Resp: 16, SpO2: 91 %  HPI  PT FOLLOWED UP WITH ID ON  S/P ATBX  STARTED ON FLUCONAZOLE/MYCOSTSATIN POWDER FOR INTERTRIGO   PT COMES IN NOW WITH FATIGUE DEC APPETITE NAUSEA   TMAX99.4 ON RA  CR2.5 WBC17.9  COVID NEG   BLOOD CX PENDING   ATBX was initiated   Not interactive  denies f/c  Just fatigue    Dos  22   Pr is going for ct  She is still lethargic afebrile 2L   REVIEW OF SYSTEMS     CONSTITUTIONAL:   No fever, chills, weight loss  ALLERGIES:    No urticaria, hay fever,    EYES:     No blurry vision, loss of vision, eye pain  ENT:      No hearing loss, sore throat  CARDIOVASCULAR:  No chest pain or palpitations  RESPIRATORY:   No cough, sob  ENDOCRINE:    No increase thirst, urination   HEME-LYMPH:   No easy bruising or bleeding  GI:     No nausea, vomiting or diarrhea  :     No urinary complaints  NEURO:    No seizures, stroke, HA  MUSCULOSKELETAL:  No muscle aches or pain, no joint pain  SKIN:     No rash or itch  PSYCH:    No depression or anxiety    Medications Prior to Admission: furosemide (LASIX) 20 MG tablet,   lisinopril (PRINIVIL;ZESTRIL) 5 MG tablet, take 1 tablet by mouth once daily  pregabalin (LYRICA) 75 MG capsule,   nystatin (MYCOSTATIN) 138793 UNIT/GM powder, Apply 3 times daily.   ipratropium-albuterol (DUONEB) 0.5-2.5 (3) MG/3ML SOLN nebulizer solution, Inhale 3 mLs into the lungs every 6 hours as needed for Shortness of Breath  budesonide (PULMICORT) 0.5 MG/2ML nebulizer suspension, Take 2 mLs by nebulization 2 times daily for 10 days  ondansetron (ZOFRAN-ODT) 4 MG disintegrating tablet, Take 1 tablet by mouth every 8 hours as needed for Nausea or Vomiting  allopurinol (ZYLOPRIM) 100 MG tablet, Take 100 mg by mouth daily  Cyanocobalamin (B-12 COMPLIANCE INJECTION) 1000 MCG/ML KIT, Inject as directed monthly  metoprolol succinate (TOPROL XL) 50 MG extended release tablet, take 1 tablet by mouth once daily  desvenlafaxine succinate (PRISTIQ) 50 MG TB24 extended release tablet, Take 1 tablet by mouth every evening  cloZAPine (CLOZARIL) 50 MG tablet, Take 50 mg by mouth nightly  cycloSPORINE (SANDIMMUNE) 100 MG capsule, Take 100 mg by mouth 2 times daily  predniSONE (DELTASONE) 5 MG tablet, Take 5 mg by mouth daily  folic acid (FOLVITE) 1 MG tablet, Take 1 mg by mouth daily  docusate sodium (COLACE) 100 MG capsule, Take 100 mg by mouth 2 times daily  mycophenolate (CELLCEPT) 500 MG tablet, Take 1,000 mg by mouth 2 times daily  simvastatin (ZOCOR) 20 MG tablet, Take 20 mg by mouth nightly. levothyroxine (SYNTHROID) 50 MCG tablet, Take 50 mcg by mouth daily.     CURRENT MEDICATIONS     Current Facility-Administered Medications:     lactulose (CHRONULAC) 10 GM/15ML solution 20 g, 20 g, Oral, TID, Rick De MD, 20 g at 01/14/22 1511    cycloSPORINE (SANDIMMUNE) capsule 100 mg, 100 mg, Oral, BID, Pippa Sanchez MD, 100 mg at 01/13/22 2147    0.9 % sodium chloride infusion, , IntraVENous, Continuous, William Hernandez MD, Last Rate: 75 mL/hr at 01/14/22 1326, New Bag at 01/14/22 1326    heparin (porcine) injection 5,000 Units, 5,000 Units, SubCUTAneous, BID, William Hernandez MD, 5,000 Units at 01/14/22 1319    sodium chloride flush 0.9 % injection 5-40 mL, 5-40 mL, IntraVENous, 2 times per day, William Hernandez MD, 10 mL at 01/12/22 6739    sodium chloride flush 0.9 % injection 5-40 mL, 5-40 mL, IntraVENous, PRN, Quiana Ramirez MD    0.9 % sodium chloride infusion, 25 mL, IntraVENous, PRN, William Hernandez MD    ondansetron (ZOFRAN-ODT) disintegrating tablet 4 mg, 4 mg, Oral, Q8H PRN, 4 mg at 01/11/22 1806 **OR** ondansetron (ZOFRAN) injection 4 mg, 4 mg, IntraVENous, Q6H PRN, Quiana Ramirez MD    polyethylene glycol (GLYCOLAX) packet 17 g, 17 g, Oral, Daily PRN, Quiana Ramirez MD, 17 g at 01/13/22 1611    acetaminophen (TYLENOL) tablet 650 mg, 650 mg, Oral, Q6H PRN, 650 mg at 01/11/22 1819 **OR** acetaminophen (TYLENOL) suppository 650 mg, 650 mg, Rectal, Q6H PRN, Quiana Ramirez MD    predniSONE (DELTASONE) tablet 5 mg, 5 mg, Oral, Daily, Pippa Sanchez MD, 5 mg at 01/13/22 2048    mycophenolate (CELLCEPT) capsule 500 mg, 500 mg, Oral, BID, Pippa Sanchez MD, 500 mg at 01/13/22 2133    sodium chloride flush 0.9 % injection 5-40 mL, 5-40 mL, IntraVENous, 2 times per day, William Hernandez MD, 10 mL at 01/13/22 0909    sodium chloride flush 0.9 % injection 5-40 mL, 5-40 mL, IntraVENous, PRN, Quiana Ramirez MD    0.9 % sodium chloride infusion, 25 mL, IntraVENous, PRN, Quiana Ramirez MD    heparin flush 100 UNIT/ML injection 100 Units, 1 mL, IntraVENous, 2 times per day, William Hernandez MD, 100 Units at 01/13/22 0907    heparin flush 100 UNIT/ML injection 100 Units, 1 mL, IntraCATHeter, PRN, Quiana Ramirez MD    cefepime (MAXIPIME) 2000 mg IVPB extended (mini-bag), 2,000 mg, IntraVENous, Q24H, Rick De MD, Last Rate: 12.5 mL/hr at 01/14/22 1645, 2,000 mg at 01/14/22 1645    allopurinol (ZYLOPRIM) tablet 100 mg, 100 mg, Oral, Daily, Susnanah Ramirez MD, 100 mg at 01/13/22 0907    budesonide (PULMICORT) nebulizer suspension 500 mcg, 500 mcg, Nebulization, BID, Quiana Ramirez MD, 500 mcg at 01/14/22 6580    desvenlafaxine succinate (PRISTIQ) extended release tablet 50 mg, 50 mg, Oral, QPM, Susannah Ramirez MD, 50 mg at 01/14/22 1832    docusate sodium (COLACE) capsule 100 mg, 100 mg, Oral, BID, Susannah Ramirez MD, 100 mg at 86/68/50 7638    folic acid (FOLVITE) tablet 1 mg, 1 mg, Oral, Daily, William Hernandez MD, 1 mg at 01/13/22 0906    ipratropium-albuterol (DUONEB) nebulizer solution 3 mL, 3 mL, Inhalation, Q6H PRN, William Hernandez MD, 3 mL at 01/13/22 1741    levothyroxine (SYNTHROID) tablet 50 mcg, 50 mcg, Oral, Daily, William Hernandez MD, 50 mcg at 01/13/22 0907    metoprolol succinate (TOPROL XL) extended release tablet 50 mg, 50 mg, Oral, Daily, Susannah Ramirez MD, 50 mg at 01/13/22 0907    atorvastatin (LIPITOR) tablet 10 mg, 10 mg, Oral, Daily, Susannah Ramirez MD, 10 mg at 01/13/22 3812    ondansetron (ZOFRAN-ODT) disintegrating tablet 4 mg, 4 mg, Oral, Once, Christo Hernandez MD  ALLERGIES     Macrodantin [nitrofurantoin macrocrystal] and Sulfa antibiotics  Immunization History   Administered Date(s) Administered    COVID-19, De [place] Peter, PF, 30mcg/0.3mL 01/28/2021, 02/18/2021, 08/18/2021      Internal Administration   First Dose COVID-19, Rj Hernandez, PF, 30mcg/0.3mL  01/28/2021   Second Dose COVID-19, Pfizer, PF, 30mcg/0.3mL   02/18/2021       Last COVID Lab SARS-CoV-2 (no units)   Date Value   01/20/2021 Not Detected     SARS-CoV-2 Antibody, Total (no units)   Date Value   01/13/2022 Non-Reactive     SARS-CoV-2, PCR (no units)   Date Value   01/11/2022 Not Detected     SARS-CoV-2, NAAT (no units)   Date Value   01/10/2022 Not Detected            PAST MEDICAL HISTORY     Past Medical History:   Diagnosis Date    Abnormal EKG     left bundle branch block    Acute gout involving toe of right foot 9/3/2020    Acute gout involving toe of right foot 9/3/2020    Acute on chronic combined systolic (congestive) and diastolic (congestive) heart failure (HCC)     Cancer (HCC)     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    Cerebrovascular disease     Clotting disorder ([address] 1985    thrombophlebitis after c section delivery    Depression     15 years followed by dr Abeba Mcgowan    Hyperlipidemia     Hypertension     Hyperthyroidism     Leg swelling 9/3/2020    Lymphedema of both lower extremities 9/3/2020    Peripheral vascular disease ([address]     Renal failure 11/2012    renal transplant    Thrombophlebitis     after c section delivery    Thyroid disease      SURGICAL HISTORY       Past Surgical History:   Procedure Laterality Date   [address]    one normal delivery    COLONOSCOPY      DIAGNOSTIC CARDIAC CATH LAB PROCEDURE  2006    normal coronaries and 1996 normal coronaries    DIALYSIS FISTULA CREATION  march and july 2012    phase one and two patient will start dialysis when needed   [address]    transfemoral venous bypass grafts    KIDNEY TRANSPLANT  2016    KIDNEY TRANSPLANT  2015    KIDNEY TRANSPLANT  2015    PARATHYROIDECTOMY  2006    left parathyroid  implant and parathyroidectomy     PHYSICAL EXAM       Vitals:    01/13/22 2040 01/14/22 0600 01/14/22 0910 01/14/22 1608   BP: (!) 148/73 138/80 (!) 150/76 131/78   Pulse: 96 102 103 108   Resp: 18 18 16 16   Temp: 98.9 °F (37.2 °C) 97.2 °F (36.2 °C) 98.7 °F (37.1 °C) 98.4 °F (36.9 °C)   TempSrc: Temporal Temporal Temporal Axillary   SpO2: 96% 95% 96% 91%   Weight:       Height:         Physical Exam   Constitutional/General: Alert and oriented, NAD IN BED  Head: NC/AT  Eyes: PERRL, EOMI  Mouth: Normal mucosa, no thrush   Neck: Supple, full ROM,    Pulmonary: Lungs  DE   to auscultation bilaterally. Not in respiratory distress  Cardiovascular:  Regular rate and rhythm, no murmurs, gallops, or rubs. Abdomen: Soft, + BS.   distension. Nontender. Extremities: Moves all extremities x 4. Warm and well perfused  Pulses:  Distal pulses intact  Skin: Warm and dry without rash  Neurologic:    No focal deficits  Psych: Normal Affect  RIGHT MIDLINE  MERCHANT      DIAGNOSTIC RESULTS   RADIOLOGY:   CT ABDOMEN PELVIS WO CONTRAST Additional Contrast? None    Result Date: 1/10/2022  EXAMINATION: CT OF THE ABDOMEN AND PELVIS WITHOUT CONTRAST 1/10/2022 6:56 pm TECHNIQUE: CT of the abdomen and pelvis was performed without the administration of intravenous contrast. Multiplanar reformatted images are provided for review. Dose modulation, iterative reconstruction, and/or weight based adjustment of the mA/kV was utilized to reduce the radiation dose to as low as reasonably achievable. COMPARISON: CT abdomen pelvis 11/17/2021 HISTORY: ORDERING SYSTEM PROVIDED HISTORY: evaluate for diarrhea TECHNOLOGIST PROVIDED HISTORY: Reason for exam:->evaluate for diarrhea Additional Contrast?->None Decision Support Exception - unselect if not a suspected or confirmed emergency medical condition->Emergency Medical Condition (MA) FINDINGS: Lower Chest: Atherosclerotic disease. Atrophic kidneys with multiple cysts. Some of the presumed cysts appear hyperdense. Subcentimeter foci in the kidneys are too small to accurately characterize.   There is a transplant kidney in the right lower quadrant. There is no hydronephrosis of the transplant kidney. Decreased perinephric stranding above the transplant kidney. Unremarkable bladder. Unremarkable appearance of the reproductive organs. No abdominal aortic aneurysm. Atherosclerotic disease. No evidence of cholecystitis or pancreatitis. No free air or significant free fluid. No evidence of bowel obstruction. Nondilated appendix. No significant fluid within the large bowel. Small fat containing umbilical hernia. Mild degenerative changes of the spine. Presumed surgical scars in the anterior abdominal wall. No significant fluid within the large bowel. No hydronephrosis of the transplant kidneys. The native kidneys are atrophic and have multiple intermediate density cysts which should be further evaluated with nonemergent ultrasound. Atherosclerotic disease. XR CHEST PORTABLE    Result Date: 1/11/2022  EXAMINATION: ONE XRAY VIEW OF THE CHEST 1/11/2022 9:11 am COMPARISON: None. HISTORY: ORDERING SYSTEM PROVIDED HISTORY: shortness of breath TECHNOLOGIST PROVIDED HISTORY: Reason for exam:->shortness of breath FINDINGS: The heart is enlarged. There are no findings of failure. The lungs are clear. There is no focal infiltrate or effusion. 1. Stable cardiomegaly. There is no evidence of failure or pneumonia.      LABS  Recent Labs     01/12/22  1250 01/13/22  1529 01/14/22  0928   WBC 18.1* 17.9* 17.0*   HGB 10.6* 10.5* 10.9*   HCT 36.4 36.0 37.7   .4* 107.8* 107.4*    167 179     Recent Labs     01/12/22  1250 01/12/22  1250 01/13/22  0920 01/13/22  0920 01/14/22  0928     --  140  --  141   K 4.6   < > 5.1*   < > 4.8      < > 109*   < > 109*   CO2 23   < > 20*   < > 21*   BUN 28*  --  25*  --  28*   CREATININE 1.8*  --  1.6*  --  1.4*   GFRAA 34  --  39  --  46   LABGLOM 28  --  32  --  38   GLUCOSE 133*  --  100*  --  114*   PROT 5.7*  --  5.6*  --  5.8*   LABALBU 2.9*  --  2.6*  --  2.6*   CALCIUM 9.7  -- 9.7  --  10.5*   BILITOT 0.9  --  0.7  --  0.7   ALKPHOS 111*  --  117*  --  106*   AST 29  --  20  --  15   ALT 25  --  19  --  17    < > = values in this interval not displayed. No results for input(s): PROCAL in the last 72 hours. Lab Results   Component Value Date    CRP 34.6 (H) 11/17/2021     Lab Results   Component Value Date    SEDRATE 62 (H) 11/20/2021     No results found for: [id number]  Lab Results   Component Value Date    COVID19 Non-Reactive 01/13/2022    COVID19 Not Detected 01/11/2022     COVID-19/MARINA-COV2 LABS  Recent Labs     01/12/22  1250 01/13/22  0920 01/14/22  0928   AST 29 20 15   ALT 25 19 17           MICROBIOLOGY:          Lab Results   Component Value Date    BC 24 Hours no growth 01/11/2022    BC 5 Days no growth 11/17/2021    BC  12/17/2019     Refer to previous culture of CXBLD from 12-17-19 @1120 for  susceptibility results      ORG Gram positive cocci 01/12/2022    ORG Klebsiella pneumoniae ssp pneumoniae 11/17/2021    ORG Staphylococcus coagulase-negative 11/17/2021    ORG Klebsiella pneumoniae ssp pneumoniae 11/17/2021     Lab Results   Component Value Date    BLOODCULT2 24 Hours no growth 01/12/2022    BLOODCULT2  11/17/2021     This organism was isolated in one set. Susceptibility testing is not routinely done as this  organism frequently represents skin contamination. Additional testing can be ordered by calling the  Microbiology Department.       ORG Gram positive cocci 01/12/2022    ORG Klebsiella pneumoniae ssp pneumoniae 11/17/2021    ORG Staphylococcus coagulase-negative 11/17/2021    ORG Klebsiella pneumoniae ssp pneumoniae 11/17/2021        Urine Culture, Routine   Date Value Ref Range Status   01/12/2022   Preliminary    >100,000 CFU/ml  Identification and sensitivity to follow     11/17/2021 <10,000 CFU/mL  Mixed gram positive organisms   (A)  Final   11/17/2021 >100,000 CFU/ml  Final     MRSA Culture Only   Date Value Ref Range Status   11/17/2021 Methicillin resistant Staph aureus not isolated  Final          FINAL IMPRESSION    Patient is a 73 y.o. female who presented with   Chief Complaint   Patient presents with    Fatigue     patient attempted to go to pcp today and didnt have the strength. came here instead. patient states sepsis prior to thanksgiving requiring admission and rehab.    and admitted for CECILY (acute kidney injury) ([facility] [N17.9]   IMMUNOCOMPROMISED PT RENAL TRANSPLANT  LEUKOCYTOSIS  pneumatosis involving the ascending to transverse colon. ?[facility] for VRE  CECILY improving cr1.4     EVAL FOR SEPSIS/VIRAL SYNDROME  CHECK CX  COVID PCR  ATBX  RENAL TO SEE  Urine  shows gpc     S/P RX KLEBSIELLA/CONS BACTEREMIA  S/P RX E COLI UTI        cycloSPORINE (SANDIMMUNE) capsule 100 mg, BID  predniSONE (DELTASONE) tablet 5 mg, Daily  mycophenolate (CELLCEPT) capsule 500 mg, BID     metronidazole (FLAGYL) 500 mg in NaCl 100 mL IVPB premix, Q8H  linezolid (ZYVOX) IVPB 600 mg, Q12H  cefepime (MAXIPIME) 2000 mg IVPB extended (mini-bag), Q24H     atbx adjusted follow clinically              Imaging and labs were reviewed per medical records       Thank you for involving me in the care of Sandra Collins. Please do not hesitate to call for any questions or concerns.          Electronically signed by Leeanne Sanchez MD on 1/14/2022 at 6:33 PM

## 2022-01-15 ENCOUNTER — APPOINTMENT (OUTPATIENT)
Dept: GENERAL RADIOLOGY | Age: 67
DRG: 673 | End: 2022-01-15
Payer: MEDICARE

## 2022-01-15 ENCOUNTER — ANESTHESIA (OUTPATIENT)
Dept: OPERATING ROOM | Age: 67
DRG: 673 | End: 2022-01-15
Payer: MEDICARE

## 2022-01-15 ENCOUNTER — ANESTHESIA EVENT (OUTPATIENT)
Dept: OPERATING ROOM | Age: 67
DRG: 673 | End: 2022-01-15
Payer: MEDICARE

## 2022-01-15 VITALS
RESPIRATION RATE: 12 BRPM | SYSTOLIC BLOOD PRESSURE: 143 MMHG | DIASTOLIC BLOOD PRESSURE: 58 MMHG | OXYGEN SATURATION: 98 %

## 2022-01-15 LAB
ALBUMIN SERPL-MCNC: 2.6 G/DL (ref 3.5–5.2)
ALP BLD-CCNC: 106 U/L (ref 35–104)
ALT SERPL-CCNC: 16 U/L (ref 0–32)
ANION GAP SERPL CALCULATED.3IONS-SCNC: 12 MMOL/L (ref 7–16)
ANISOCYTOSIS: ABNORMAL
AST SERPL-CCNC: 18 U/L (ref 0–31)
BASOPHILS ABSOLUTE: 0.18 E9/L (ref 0–0.2)
BASOPHILS RELATIVE PERCENT: 0.9 % (ref 0–2)
BILIRUB SERPL-MCNC: 0.7 MG/DL (ref 0–1.2)
BUN BLDV-MCNC: 36 MG/DL (ref 6–23)
CALCIUM SERPL-MCNC: 10.4 MG/DL (ref 8.6–10.2)
CHLORIDE BLD-SCNC: 111 MMOL/L (ref 98–107)
CO2: 20 MMOL/L (ref 22–29)
CREAT SERPL-MCNC: 1.5 MG/DL (ref 0.5–1)
EOSINOPHILS ABSOLUTE: 0 E9/L (ref 0.05–0.5)
EOSINOPHILS RELATIVE PERCENT: 0.1 % (ref 0–6)
GFR AFRICAN AMERICAN: 42
GFR NON-AFRICAN AMERICAN: 35 ML/MIN/1.73
GLUCOSE BLD-MCNC: 129 MG/DL (ref 74–99)
HCT VFR BLD CALC: 37.9 % (ref 34–48)
HEMOGLOBIN: 11.3 G/DL (ref 11.5–15.5)
LYMPHOCYTES ABSOLUTE: 0.4 E9/L (ref 1.5–4)
LYMPHOCYTES RELATIVE PERCENT: 1.7 % (ref 20–42)
MAGNESIUM: 1.8 MG/DL (ref 1.6–2.6)
MCH RBC QN AUTO: 31.1 PG (ref 26–35)
MCHC RBC AUTO-ENTMCNC: 29.8 % (ref 32–34.5)
MCV RBC AUTO: 104.4 FL (ref 80–99.9)
MONOCYTES ABSOLUTE: 0.81 E9/L (ref 0.1–0.95)
MONOCYTES RELATIVE PERCENT: 4.3 % (ref 2–12)
MYELOCYTE PERCENT: 0.9 % (ref 0–0)
NEUTROPHILS ABSOLUTE: 18.79 E9/L (ref 1.8–7.3)
NEUTROPHILS RELATIVE PERCENT: 92.2 % (ref 43–80)
ORGANISM: ABNORMAL
PDW BLD-RTO: 15.4 FL (ref 11.5–15)
PHOSPHORUS: 2.3 MG/DL (ref 2.5–4.5)
PLATELET # BLD: 192 E9/L (ref 130–450)
PMV BLD AUTO: 13 FL (ref 7–12)
POTASSIUM SERPL-SCNC: 4.5 MMOL/L (ref 3.5–5)
RBC # BLD: 3.63 E12/L (ref 3.5–5.5)
SODIUM BLD-SCNC: 143 MMOL/L (ref 132–146)
TOTAL PROTEIN: 5.7 G/DL (ref 6.4–8.3)
URINE CULTURE, ROUTINE: ABNORMAL
VACUOLATED NEUTROPHILS: ABNORMAL
WBC # BLD: 20.2 E9/L (ref 4.5–11.5)

## 2022-01-15 PROCEDURE — 58660 LAPAROSCOPY LYSIS: CPT | Performed by: SURGERY

## 2022-01-15 PROCEDURE — 2500000003 HC RX 250 WO HCPCS: Performed by: SURGERY

## 2022-01-15 PROCEDURE — 85025 COMPLETE CBC W/AUTO DIFF WBC: CPT

## 2022-01-15 PROCEDURE — 3700000001 HC ADD 15 MINUTES (ANESTHESIA): Performed by: SURGERY

## 2022-01-15 PROCEDURE — 99222 1ST HOSP IP/OBS MODERATE 55: CPT | Performed by: SURGERY

## 2022-01-15 PROCEDURE — 80053 COMPREHEN METABOLIC PANEL: CPT

## 2022-01-15 PROCEDURE — 83735 ASSAY OF MAGNESIUM: CPT

## 2022-01-15 PROCEDURE — 2500000003 HC RX 250 WO HCPCS: Performed by: SPECIALIST

## 2022-01-15 PROCEDURE — 99222 1ST HOSP IP/OBS MODERATE 55: CPT | Performed by: INTERNAL MEDICINE

## 2022-01-15 PROCEDURE — 2700000000 HC OXYGEN THERAPY PER DAY

## 2022-01-15 PROCEDURE — 3700000000 HC ANESTHESIA ATTENDED CARE: Performed by: SURGERY

## 2022-01-15 PROCEDURE — 7100000000 HC PACU RECOVERY - FIRST 15 MIN: Performed by: SURGERY

## 2022-01-15 PROCEDURE — 0DNW4ZZ RELEASE PERITONEUM, PERCUTANEOUS ENDOSCOPIC APPROACH: ICD-10-PCS | Performed by: SURGERY

## 2022-01-15 PROCEDURE — 6370000000 HC RX 637 (ALT 250 FOR IP): Performed by: INTERNAL MEDICINE

## 2022-01-15 PROCEDURE — 6360000002 HC RX W HCPCS: Performed by: NURSE ANESTHETIST, CERTIFIED REGISTERED

## 2022-01-15 PROCEDURE — 71045 X-RAY EXAM CHEST 1 VIEW: CPT

## 2022-01-15 PROCEDURE — 36415 COLL VENOUS BLD VENIPUNCTURE: CPT

## 2022-01-15 PROCEDURE — 6360000002 HC RX W HCPCS: Performed by: SPECIALIST

## 2022-01-15 PROCEDURE — 2580000003 HC RX 258: Performed by: NURSE ANESTHETIST, CERTIFIED REGISTERED

## 2022-01-15 PROCEDURE — 6360000002 HC RX W HCPCS: Performed by: INTERNAL MEDICINE

## 2022-01-15 PROCEDURE — 2709999900 HC NON-CHARGEABLE SUPPLY: Performed by: SURGERY

## 2022-01-15 PROCEDURE — 2060000000 HC ICU INTERMEDIATE R&B

## 2022-01-15 PROCEDURE — 84100 ASSAY OF PHOSPHORUS: CPT

## 2022-01-15 PROCEDURE — 2580000003 HC RX 258: Performed by: SPECIALIST

## 2022-01-15 PROCEDURE — 94002 VENT MGMT INPAT INIT DAY: CPT

## 2022-01-15 PROCEDURE — 2720000010 HC SURG SUPPLY STERILE: Performed by: SURGERY

## 2022-01-15 PROCEDURE — 94660 CPAP INITIATION&MGMT: CPT

## 2022-01-15 PROCEDURE — 94640 AIRWAY INHALATION TREATMENT: CPT

## 2022-01-15 PROCEDURE — 2580000003 HC RX 258: Performed by: INTERNAL MEDICINE

## 2022-01-15 PROCEDURE — 3600000004 HC SURGERY LEVEL 4 BASE: Performed by: SURGERY

## 2022-01-15 PROCEDURE — 3600000014 HC SURGERY LEVEL 4 ADDTL 15MIN: Performed by: SURGERY

## 2022-01-15 PROCEDURE — 2500000003 HC RX 250 WO HCPCS: Performed by: NURSE ANESTHETIST, CERTIFIED REGISTERED

## 2022-01-15 PROCEDURE — 7100000001 HC PACU RECOVERY - ADDTL 15 MIN: Performed by: SURGERY

## 2022-01-15 RX ORDER — ROCURONIUM BROMIDE 10 MG/ML
INJECTION, SOLUTION INTRAVENOUS PRN
Status: DISCONTINUED | OUTPATIENT
Start: 2022-01-15 | End: 2022-01-15 | Stop reason: SDUPTHER

## 2022-01-15 RX ORDER — SODIUM CHLORIDE 9 MG/ML
INJECTION, SOLUTION INTRAVENOUS CONTINUOUS PRN
Status: DISCONTINUED | OUTPATIENT
Start: 2022-01-15 | End: 2022-01-15 | Stop reason: SDUPTHER

## 2022-01-15 RX ORDER — LABETALOL HYDROCHLORIDE 5 MG/ML
5 INJECTION, SOLUTION INTRAVENOUS EVERY 10 MIN PRN
Status: DISCONTINUED | OUTPATIENT
Start: 2022-01-15 | End: 2022-01-15 | Stop reason: HOSPADM

## 2022-01-15 RX ORDER — LIDOCAINE HYDROCHLORIDE 20 MG/ML
INJECTION, SOLUTION INTRAVENOUS PRN
Status: DISCONTINUED | OUTPATIENT
Start: 2022-01-15 | End: 2022-01-15 | Stop reason: SDUPTHER

## 2022-01-15 RX ORDER — GLYCOPYRROLATE 1 MG/5 ML
SYRINGE (ML) INTRAVENOUS PRN
Status: DISCONTINUED | OUTPATIENT
Start: 2022-01-15 | End: 2022-01-15 | Stop reason: SDUPTHER

## 2022-01-15 RX ORDER — PROPOFOL 10 MG/ML
INJECTION, EMULSION INTRAVENOUS PRN
Status: DISCONTINUED | OUTPATIENT
Start: 2022-01-15 | End: 2022-01-15 | Stop reason: SDUPTHER

## 2022-01-15 RX ORDER — DEXAMETHASONE SODIUM PHOSPHATE 10 MG/ML
INJECTION, SOLUTION INTRAMUSCULAR; INTRAVENOUS PRN
Status: DISCONTINUED | OUTPATIENT
Start: 2022-01-15 | End: 2022-01-15 | Stop reason: SDUPTHER

## 2022-01-15 RX ORDER — NEOSTIGMINE METHYLSULFATE 1 MG/ML
INJECTION, SOLUTION INTRAVENOUS PRN
Status: DISCONTINUED | OUTPATIENT
Start: 2022-01-15 | End: 2022-01-15 | Stop reason: SDUPTHER

## 2022-01-15 RX ORDER — MEPERIDINE HYDROCHLORIDE 25 MG/ML
12.5 INJECTION INTRAMUSCULAR; INTRAVENOUS; SUBCUTANEOUS EVERY 5 MIN PRN
Status: DISCONTINUED | OUTPATIENT
Start: 2022-01-15 | End: 2022-01-15 | Stop reason: HOSPADM

## 2022-01-15 RX ORDER — FENTANYL CITRATE 50 UG/ML
INJECTION, SOLUTION INTRAMUSCULAR; INTRAVENOUS PRN
Status: DISCONTINUED | OUTPATIENT
Start: 2022-01-15 | End: 2022-01-15 | Stop reason: SDUPTHER

## 2022-01-15 RX ORDER — MORPHINE SULFATE 2 MG/ML
2 INJECTION, SOLUTION INTRAMUSCULAR; INTRAVENOUS EVERY 5 MIN PRN
Status: DISCONTINUED | OUTPATIENT
Start: 2022-01-15 | End: 2022-01-15 | Stop reason: HOSPADM

## 2022-01-15 RX ORDER — SUCCINYLCHOLINE/SOD CL,ISO/PF 200MG/10ML
SYRINGE (ML) INTRAVENOUS PRN
Status: DISCONTINUED | OUTPATIENT
Start: 2022-01-15 | End: 2022-01-15 | Stop reason: SDUPTHER

## 2022-01-15 RX ORDER — HYDRALAZINE HYDROCHLORIDE 20 MG/ML
5 INJECTION INTRAMUSCULAR; INTRAVENOUS EVERY 10 MIN PRN
Status: DISCONTINUED | OUTPATIENT
Start: 2022-01-15 | End: 2022-01-15 | Stop reason: HOSPADM

## 2022-01-15 RX ORDER — ONDANSETRON 2 MG/ML
INJECTION INTRAMUSCULAR; INTRAVENOUS PRN
Status: DISCONTINUED | OUTPATIENT
Start: 2022-01-15 | End: 2022-01-15 | Stop reason: SDUPTHER

## 2022-01-15 RX ORDER — ONDANSETRON 2 MG/ML
4 INJECTION INTRAMUSCULAR; INTRAVENOUS
Status: DISCONTINUED | OUTPATIENT
Start: 2022-01-15 | End: 2022-01-15 | Stop reason: HOSPADM

## 2022-01-15 RX ORDER — FUROSEMIDE 10 MG/ML
40 INJECTION INTRAMUSCULAR; INTRAVENOUS ONCE
Status: COMPLETED | OUTPATIENT
Start: 2022-01-15 | End: 2022-01-15

## 2022-01-15 RX ORDER — PROMETHAZINE HYDROCHLORIDE 25 MG/ML
6.25 INJECTION, SOLUTION INTRAMUSCULAR; INTRAVENOUS
Status: DISCONTINUED | OUTPATIENT
Start: 2022-01-15 | End: 2022-01-15 | Stop reason: HOSPADM

## 2022-01-15 RX ORDER — BUPIVACAINE HYDROCHLORIDE AND EPINEPHRINE 2.5; 5 MG/ML; UG/ML
INJECTION, SOLUTION EPIDURAL; INFILTRATION; INTRACAUDAL; PERINEURAL PRN
Status: DISCONTINUED | OUTPATIENT
Start: 2022-01-15 | End: 2022-01-15 | Stop reason: ALTCHOICE

## 2022-01-15 RX ADMIN — HEPARIN 100 UNITS: 100 SYRINGE at 22:18

## 2022-01-15 RX ADMIN — ROCURONIUM BROMIDE 20 MG: 10 SOLUTION INTRAVENOUS at 13:21

## 2022-01-15 RX ADMIN — METRONIDAZOLE 500 MG: 500 INJECTION, SOLUTION INTRAVENOUS at 22:21

## 2022-01-15 RX ADMIN — PROPOFOL 100 MG: 10 INJECTION, EMULSION INTRAVENOUS at 13:15

## 2022-01-15 RX ADMIN — FENTANYL CITRATE 150 MCG: 50 INJECTION, SOLUTION INTRAMUSCULAR; INTRAVENOUS at 13:15

## 2022-01-15 RX ADMIN — LIDOCAINE HYDROCHLORIDE 100 MG: 20 INJECTION, SOLUTION INTRAVENOUS at 13:15

## 2022-01-15 RX ADMIN — BUDESONIDE 500 MCG: 0.5 INHALANT RESPIRATORY (INHALATION) at 18:34

## 2022-01-15 RX ADMIN — Medication 3 MG: at 13:45

## 2022-01-15 RX ADMIN — CEFEPIME HYDROCHLORIDE 2000 MG: 2 INJECTION, POWDER, FOR SOLUTION INTRAVENOUS at 22:15

## 2022-01-15 RX ADMIN — ONDANSETRON 4 MG: 2 INJECTION INTRAMUSCULAR; INTRAVENOUS at 13:25

## 2022-01-15 RX ADMIN — PHENYLEPHRINE HYDROCHLORIDE 200 MCG: 10 INJECTION INTRAVENOUS at 13:35

## 2022-01-15 RX ADMIN — Medication 10 ML: at 08:49

## 2022-01-15 RX ADMIN — PHENYLEPHRINE HYDROCHLORIDE 200 MCG: 10 INJECTION INTRAVENOUS at 13:36

## 2022-01-15 RX ADMIN — METRONIDAZOLE 500 MG: 500 INJECTION, SOLUTION INTRAVENOUS at 06:26

## 2022-01-15 RX ADMIN — PHENYLEPHRINE HYDROCHLORIDE 200 MCG: 10 INJECTION INTRAVENOUS at 13:24

## 2022-01-15 RX ADMIN — DEXAMETHASONE SODIUM PHOSPHATE 10 MG: 10 INJECTION INTRAMUSCULAR; INTRAVENOUS at 13:25

## 2022-01-15 RX ADMIN — ROCURONIUM BROMIDE 10 MG: 10 SOLUTION INTRAVENOUS at 13:15

## 2022-01-15 RX ADMIN — HEPARIN SODIUM 5000 UNITS: 5000 INJECTION INTRAVENOUS; SUBCUTANEOUS at 22:16

## 2022-01-15 RX ADMIN — IPRATROPIUM BROMIDE AND ALBUTEROL SULFATE 3 ML: .5; 3 SOLUTION RESPIRATORY (INHALATION) at 06:08

## 2022-01-15 RX ADMIN — Medication 120 MG: at 13:15

## 2022-01-15 RX ADMIN — SUGAMMADEX 150 MG: 100 INJECTION, SOLUTION INTRAVENOUS at 13:53

## 2022-01-15 RX ADMIN — Medication 10 ML: at 10:29

## 2022-01-15 RX ADMIN — LINEZOLID 600 MG: 600 INJECTION, SOLUTION INTRAVENOUS at 08:48

## 2022-01-15 RX ADMIN — PHENYLEPHRINE HYDROCHLORIDE 200 MCG: 10 INJECTION INTRAVENOUS at 13:30

## 2022-01-15 RX ADMIN — SODIUM CHLORIDE: 9 INJECTION, SOLUTION INTRAVENOUS at 13:03

## 2022-01-15 RX ADMIN — LINEZOLID 600 MG: 600 INJECTION, SOLUTION INTRAVENOUS at 22:12

## 2022-01-15 RX ADMIN — IPRATROPIUM BROMIDE AND ALBUTEROL SULFATE 3 ML: .5; 3 SOLUTION RESPIRATORY (INHALATION) at 18:34

## 2022-01-15 RX ADMIN — BUDESONIDE 500 MCG: 0.5 INHALANT RESPIRATORY (INHALATION) at 06:08

## 2022-01-15 RX ADMIN — FUROSEMIDE 40 MG: 10 INJECTION, SOLUTION INTRAVENOUS at 08:48

## 2022-01-15 RX ADMIN — Medication 0.6 MG: at 13:45

## 2022-01-15 ASSESSMENT — PULMONARY FUNCTION TESTS
PIF_VALUE: 35
PIF_VALUE: 34
PIF_VALUE: 2
PIF_VALUE: 30
PIF_VALUE: 2
PIF_VALUE: 39
PIF_VALUE: 0
PIF_VALUE: 17
PIF_VALUE: 36
PIF_VALUE: 15
PIF_VALUE: 29
PIF_VALUE: 36
PIF_VALUE: 33
PIF_VALUE: 28
PIF_VALUE: 36
PIF_VALUE: 2
PIF_VALUE: 3
PIF_VALUE: 11
PIF_VALUE: 35
PIF_VALUE: 13
PIF_VALUE: 33
PIF_VALUE: 28
PIF_VALUE: 36
PIF_VALUE: 31
PIF_VALUE: 0
PIF_VALUE: 2
PIF_VALUE: 35
PIF_VALUE: 34
PIF_VALUE: 4
PIF_VALUE: 35
PIF_VALUE: 33
PIF_VALUE: 29
PIF_VALUE: 33
PIF_VALUE: 34
PIF_VALUE: 35
PIF_VALUE: 0
PIF_VALUE: 36
PIF_VALUE: 30
PIF_VALUE: 0
PIF_VALUE: 35
PIF_VALUE: 30
PIF_VALUE: 36
PIF_VALUE: 32
PIF_VALUE: 39
PIF_VALUE: 35
PIF_VALUE: 29
PIF_VALUE: 36
PIF_VALUE: 28
PIF_VALUE: 2
PIF_VALUE: 30
PIF_VALUE: 3
PIF_VALUE: 2
PIF_VALUE: 36
PIF_VALUE: 17
PIF_VALUE: 36
PIF_VALUE: 0
PIF_VALUE: 7
PIF_VALUE: 3
PIF_VALUE: 36
PIF_VALUE: 31

## 2022-01-15 ASSESSMENT — PAIN SCALES - GENERAL
PAINLEVEL_OUTOF10: 0

## 2022-01-15 NOTE — OP NOTE
Operative Note      Patient: Shawn Warren  YOB: 1955  MRN: [id number]    Date of Procedure: 1/15/2022    Pre-Op Diagnosis: POSSIBLE BOWEL OBSTRUCTION, bowel ischemia    Post-Op Diagnosis: no ischemia, complex adhesions       Procedure(s):  BOWEL RESECTION LAPAROSCOPIC  Lysis of adhesions    Surgeon(s):  Cassandra Allen MD    Assistant:   First Assistant: Yas Aguilar    Anesthesia: General    Estimated Blood Loss (mL): Minimal    Complications: None    Specimens:   * No specimens in log *    Implants:  * No implants in log *      Drains:   NG/OG/NJ/NE Tube Nasogastric 16 fr Left nostril (Active)       Urethral Catheter (Active)   Catheter Indications Need for fluid volume management of the critically ill patient in a critical care setting 01/15/22 0756   Site Assessment No urethral drainage;Pink 01/15/22 0756   Urine Color Orange 01/15/22 0756   Urine Appearance Cloudy 01/15/22 0756   Urine Odor Fruity 01/14/22 0600   Output (mL) 375 mL 01/15/22 0357       Findings: as dictated    Detailed Description of Procedure:   [id number]    Electronically signed by Cassandra Allen MD on 1/15/2022 at 1:46 PM

## 2022-01-15 NOTE — FLOWSHEET NOTE
01/15/22 1005   Provider Notification   Reason for Communication Evaluate  (returning Dr call. Was in [facility] pt room)   Provider Name Dr Amarilys Bautista   Provider Notification Physician   Method of Communication Call   Response Waiting for response  (msg to call me back)     [phone] Dr Amarilys Bautista returned call & is aware of pt vitals & lethargy.

## 2022-01-15 NOTE — ANESTHESIA PRE PROCEDURE
Department of Anesthesiology  Preprocedure Note       Name:  Henry Guerrero   Age:  73 y.o.  :  1955                                          MRN:  [id number]         Date:  1/15/2022      Surgeon: Saúl Cotto):  Tara Cohen MD    Procedure: Procedure(s):  BOWEL RESECTION LAPAROSCOPIC    Medications prior to admission:   Prior to Admission medications    Medication Sig Start Date End Date Taking? Authorizing Provider   furosemide (LASIX) 20 MG tablet  21   Historical Provider, MD   lisinopril (PRINIVIL;ZESTRIL) 5 MG tablet take 1 tablet by mouth once daily 21   Historical Provider, MD   pregabalin (LYRICA) 75 MG capsule  21   Historical Provider, MD   nystatin (MYCOSTATIN) 301563 UNIT/GM powder Apply 3 times daily.  21   KATINA Hrenandez - CNP   ipratropium-albuterol (DUONEB) 0.5-2.5 (3) MG/3ML SOLN nebulizer solution Inhale 3 mLs into the lungs every 6 hours as needed for Shortness of Breath 21   Susannah Ramirez MD   budesonide (PULMICORT) 0.5 MG/2ML nebulizer suspension Take 2 mLs by nebulization 2 times daily for 10 days 21  Erica Levi MD   ondansetron (ZOFRAN-ODT) 4 MG disintegrating tablet Take 1 tablet by mouth every 8 hours as needed for Nausea or Vomiting 21   Erica Levi MD   allopurinol (ZYLOPRIM) 100 MG tablet Take 100 mg by mouth daily    Historical Provider, MD   Cyanocobalamin (B-12 COMPLIANCE INJECTION) 1000 MCG/ML KIT Inject as directed monthly    Historical Provider, MD   metoprolol succinate (TOPROL XL) 50 MG extended release tablet take 1 tablet by mouth once daily 20   Anastasiia Jeff MD   desvenlafaxine succinate (PRISTIQ) 50 MG TB24 extended release tablet Take 1 tablet by mouth every evening 19   Erica Levi MD   cloZAPine (CLOZARIL) 50 MG tablet Take 50 mg by mouth nightly    Historical Provider, MD   cycloSPORINE (SANDIMMUNE) 100 MG capsule Take 100 mg by mouth 2 times daily    Historical Provider, MD   predniSONE (DELTASONE) 5 MG tablet Take 5 mg by mouth daily    Historical Provider, MD   folic acid (FOLVITE) 1 MG tablet Take 1 mg by mouth daily    Historical Provider, MD   docusate sodium (COLACE) 100 MG capsule Take 100 mg by mouth 2 times daily    Historical Provider, MD   mycophenolate (CELLCEPT) 500 MG tablet Take 1,000 mg by mouth 2 times daily    Historical Provider, MD   simvastatin (ZOCOR) 20 MG tablet Take 20 mg by mouth nightly. Historical Provider, MD   levothyroxine (SYNTHROID) 50 MCG tablet Take 50 mcg by mouth daily.       Historical Provider, MD       Current medications:    Current Facility-Administered Medications   Medication Dose Route Frequency Provider Last Rate Last Admin    meperidine (DEMEROL) injection 12.5 mg  12.5 mg IntraVENous Q5 Min PRN Alvaro Antonio MD        morphine (PF) injection 2 mg  2 mg IntraVENous Q5 Min PRN Alvaro Antonio MD        HYDROmorphone (DILAUDID) injection 0.5 mg  0.5 mg IntraVENous Q5 Min PRN Alvaro Antonio MD        ondansetron [facility]) injection 4 mg  4 mg IntraVENous Once PRN Alvaro Antonio MD        promethazine [facility]) injection 6.25 mg  6.25 mg IntraMUSCular Once PRN Alvaro Antonio MD        labetalol (NORMODYNE;TRANDATE) injection 5 mg  5 mg IntraVENous Q10 Min PRN Alvaro Antonio MD        hydrALAZINE (APRESOLINE) injection 5 mg  5 mg IntraVENous Q10 Min PRN Alvaro Antonio MD        metronidazole (FLAGYL) 500 mg in NaCl 100 mL IVPB premix  500 mg IntraVENous Q8H Reyes Barrier, MD   Stopped at 01/15/22 0800    linezolid (ZYVOX) IVPB 600 mg  600 mg IntraVENous Q12H Reyes Barrier, MD   Stopped at 01/15/22 1031    heparin (porcine) injection 5,000 Units  5,000 Units SubCUTAneous BID Flakito Flores MD   5,000 Units at 01/14/22 2201    sodium chloride flush 0.9 % injection 5-40 mL  5-40 mL IntraVENous 2 times per day Flakito Flores MD   10 mL at 01/15/22 1029    sodium chloride flush 0.9 % injection 5-40 mL  5-40 mL IntraVENous PRN Flakito Flores MD  0.9 % sodium chloride infusion  25 mL IntraVENous PRN Elgin Kerr MD        ondansetron (ZOFRAN-ODT) disintegrating tablet 4 mg  4 mg Oral Q8H PRN Elgin Kerr MD   4 mg at 01/11/22 1806    Or    ondansetron (ZOFRAN) injection 4 mg  4 mg IntraVENous Q6H PRN Marianne Ramirez MD        polyethylene glycol (GLYCOLAX) packet 17 g  17 g Oral Daily PRN Elgin Kerr MD   17 g at 01/13/22 1611    acetaminophen (TYLENOL) tablet 650 mg  650 mg Oral Q6H PRN Elgin Kerr MD   650 mg at 01/11/22 1819    Or    acetaminophen (TYLENOL) suppository 650 mg  650 mg Rectal Q6H PRN Elgin Kerr MD        predniSONE (DELTASONE) tablet 5 mg  5 mg Oral Daily Carlos Alberto Mcclain MD   5 mg at 01/13/22 2823    mycophenolate (CELLCEPT) capsule 500 mg  500 mg Oral BID Carlos Alberto Mcclain MD   500 mg at 01/13/22 2133    sodium chloride flush 0.9 % injection 5-40 mL  5-40 mL IntraVENous 2 times per day Elgin Kerr MD   10 mL at 01/15/22 0849    sodium chloride flush 0.9 % injection 5-40 mL  5-40 mL IntraVENous PRN Elgin Kerr MD        0.9 % sodium chloride infusion  25 mL IntraVENous PRN Elgin Kerr MD        heparin flush 100 UNIT/ML injection 100 Units  1 mL IntraVENous 2 times per day Elgin Kerr MD   100 Units at 01/14/22 2202    heparin flush 100 UNIT/ML injection 100 Units  1 mL IntraCATHeter PRN Marianne Ramirez MD        cefepime (MAXIPIME) 2000 mg IVPB extended (mini-bag)  2,000 mg IntraVENous Q24H Ines Medrano MD   Stopped at 01/14/22 2121    allopurinol (ZYLOPRIM) tablet 100 mg  100 mg Oral Daily Susannah Ramirez MD   100 mg at 01/13/22 0907    budesonide (PULMICORT) nebulizer suspension 500 mcg  500 mcg Nebulization BID Elgin Kerr MD   500 mcg at 01/15/22 4330    desvenlafaxine succinate (PRISTIQ) extended release tablet 50 mg  50 mg Oral QPM Susannah Ramirez MD   50 mg at 01/13/22 1813    docusate sodium (COLACE) capsule 100 mg  100 mg Oral BID Elgin Kerr MD   100 mg at 01/13/22 2145  folic acid (FOLVITE) tablet 1 mg  1 mg Oral Daily Susannah Ramirez MD   1 mg at 01/13/22 0906    ipratropium-albuterol (DUONEB) nebulizer solution 3 mL  3 mL Inhalation Q6H PRN Jair Fitzgerald MD   3 mL at 01/15/22 0608    levothyroxine (SYNTHROID) tablet 50 mcg  50 mcg Oral Daily Jair Fitzgerald MD   50 mcg at 01/13/22 0611    metoprolol succinate (TOPROL XL) extended release tablet 50 mg  50 mg Oral Daily Jair Fitzgerald MD   50 mg at 01/13/22 5249    atorvastatin (LIPITOR) tablet 10 mg  10 mg Oral Daily Magdalene Ramirez MD   10 mg at 01/13/22 2544    ondansetron (ZOFRAN-ODT) disintegrating tablet 4 mg  4 mg Oral Once Oralia Marsh MD           Allergies: Allergies   Allergen Reactions    Macrodantin [Nitrofurantoin Macrocrystal]     Sulfa Antibiotics        Problem List:    Patient Active Problem List   Diagnosis Code    Peripheral vascular disease ([facility] [facility] I73.9    Depression F32. A    Clotting disorder ([facility]) D68.9    Abnormal EKG R94.31    Cancer ([facility]) C80.1    Over weight E66.3    S/p cadaver renal transplant Z94.0    ESRD (end stage renal disease) ([facility]) N18.6    Non morbid obesity due to excess calories E66.09    Hypertension I10    Leg swelling M79.89    Lymphedema of both lower extremities I89.0    Acute gout involving toe of right foot M10.9    Sepsis secondary to UTI ([facility]) A41.9, N39.0    Acute kidney injury superimposed on CKD ([facility]) N17.9, N18.9    Thyroid disease E07.9    Acute on chronic combined systolic (congestive) and diastolic (congestive) heart failure ([facility]) I50.43    Sepsis ([facility]) A41.9    Acute renal failure ([facility]) N17.9    CECILY (acute kidney injury) ([facility] [facility] N17.9       Past Medical History:        Diagnosis Date    Abnormal EKG     left bundle branch block    Acute gout involving toe of right foot 9/3/2020    Acute gout involving toe of right foot 9/3/2020    Acute on chronic combined systolic (congestive) and diastolic (congestive) heart failure ([facility]     Cancer [facility])     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    Cerebrovascular disease     Clotting disorder ([facility] [address] 1985    thrombophlebitis after c section delivery    Depression     15 years followed by dr Hermes Ritter Hyperlipidemia     Hypertension     Hyperthyroidism     Leg swelling 9/3/2020    Lymphedema of both lower extremities 9/3/2020    Peripheral vascular disease ([facility] [address]     Renal failure 2012    renal transplant    Thrombophlebitis     after c section delivery    Thyroid disease        Past Surgical History:        Procedure Laterality Date   [address]    one normal delivery    COLONOSCOPY      DIAGNOSTIC CARDIAC CATH LAB PROCEDURE      normal coronaries and  normal coronaries    DIALYSIS FISTULA CREATION  march and 2012    phase one and two patient will start dialysis when needed   [address]    transfemoral venous bypass grafts    KIDNEY TRANSPLANT  2016   [address] [facility]     [address] [facility]      PARATHYROIDECTOMY  2006    left parathyroid  implant and parathyroidectomy       Social History:    Social History     Tobacco Use    Smoking status: Former Smoker     Packs/day: 1.00     Years: 20.00     Pack years: 20.00     Quit date: 10/1/2015     Years since quittin.2    Smokeless tobacco: Never Used   Substance Use Topics    Alcohol use: No     Comment: 2 cups coffee per day                                Counseling given: Not Answered      Vital Signs (Current):   Vitals:    01/15/22 0758 01/15/22 1019 01/15/22 1025 01/15/22 1028   BP:  (!) 140/62     Pulse:  122     Resp:  28     Temp:  100.7 °F (38.2 °C) 99.2 °F (37.3 °C)    TempSrc:  Infrared Infrared    SpO2: 93% (!) 88% 91% 95%   Weight:       Height:                                                  BP Readings from Last 3 Encounters:   01/15/22 (!) 140/62   21 132/75   21 (!) 157/88       NPO Status: steroids or chemotherapy, procedure related.  Clinical correlation   recommended.  Surgical consultation may be needed.       There is fairly dense bibasilar infiltrates which have progressed from prior   exam.  Correlation with fever and white count and follow-up to resolution   recommended.       Multiple mixed density cysts both kidneys related to chronic renal failure   with the transplant right lower quadrant.       There is distention of small bowel in the lower pelvis without obstructing   lesion identified.       Small hiatal hernia. Anesthesia Evaluation  Patient summary reviewed no history of anesthetic complications:   Airway: Mallampati: Unable to assess / NA     Neck ROM: full   Dental:          Pulmonary: breath sounds clear to auscultation                             Cardiovascular:    (+) hypertension:, CHF: diastolic, hyperlipidemia        Rhythm: regular  Rate: normal           Beta Blocker:  Dose within 24 Hrs         Neuro/Psych:   (+) psychiatric history:depression/anxiety             GI/Hepatic/Renal:   (+) renal disease (s/p cadaver kidney transplant in 2015):, morbid obesity          Endo/Other:    (+) hyperthyroidism, blood dyscrasia (Elevated WBC)::., .                  ROS comment: Sepsis Abdominal:   (+) obese,           Vascular:   + PVD, aortic or cerebral (s/p femoral bypass), . Other Findings:           Anesthesia Plan      general     ASA 4 - emergent       Induction: intravenous and rapid sequence. MIPS: Postoperative opioids intended, Prophylactic antiemetics administered and Postoperative trial extubation. Anesthetic plan and risks discussed with patient and child/children. Plan discussed with CRNA. DOS STAFF ADDENDUM:    Pt seen and examined, chart reviewed (including anesthesia, drug and allergy history). Anesthetic plan, risks, benefits, alternatives, and personnel involved discussed with patient.   Patient verbalized an understanding and agrees to proceed. Plan discussed with care team members and agreed upon.     Amador Garcia MD  Staff Anesthesiologist  1:08 PM      Amador Garcia MD   1/15/2022

## 2022-01-15 NOTE — CONSULTS
INPATIENT CARDIOLOGY CONSULT    Name: Dick Lara    Age: 73 y.o. Date of Admission: 1/10/2022  6:12 PM    Date of Service: 1/15/2022    Reason for Consultation: Tachycardia    Chief Complaint   Patient presents with    Fatigue     patient attempted to go to pcp today and didnt have the strength. came here instead. patient states sepsis prior to thanksgiving requiring admission and rehab. Referring Physician: Paige Farrell MD    History of Present Illness: 50-year-old female with history of bipolar disorder, prior tobacco abuse apparently smoke 20 pack years and quit in , hypertension, hyperlipidemia, obesity, left bundle branch block, history of thrombophlebitis after , history of femoral bypass in , depression, history of nonischemic cardiomyopathy with prior cardiac catheterization in  noted to be normal coronaries and prior echocardiogram in  showing EF of 35 to 38%. History of end-stage renal disease with subsequent dialysis fistula creation in  apparently due to lithium toxicity with subsequent what appears to be renal transplant. She also has history of hypothyroidism, hyperparathyroidism, gout, prior history of thrombocytopenia, lymphedema, and history of nonsustained ventricular tachycardia that was attributed to hypomagnesemia. He is hospitalized for fatigue, weakness, acute kidney injury and sepsis and now found to have pneumatosis of the right colon and transverse colon that is worsening with now altered mental status and tachycardia and awaiting general surgery. Cardiology is consulted for tachycardia. Patient currently in [address] [place] [address].           Past Medical History:  Past Medical History:   Diagnosis Date    Abnormal EKG     left bundle branch block    Acute gout involving toe of right foot 9/3/2020    Acute gout involving toe of right foot 9/3/2020    Acute on chronic combined systolic (congestive) and diastolic (congestive) heart failure ([address]     Cancer ([facility]     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    Cerebrovascular disease     Clotting disorder ([facility] 1985    thrombophlebitis after c section delivery    Depression     15 years followed by dr Elly Roldan Hyperlipidemia     Hypertension     Hyperthyroidism     Leg swelling 9/3/2020    Lymphedema of both lower extremities 9/3/2020    Peripheral vascular disease ([facility]     Renal failure 2012    renal transplant    Thrombophlebitis     after c section delivery    Thyroid disease        Past Surgical History:  Past Surgical History:   Procedure Laterality Date   [address]    one normal delivery    COLONOSCOPY      DIAGNOSTIC CARDIAC CATH LAB PROCEDURE      normal coronaries and  normal coronaries    DIALYSIS FISTULA CREATION  march and 2012    phase one and two patient will start dialysis when needed   [address]    transfemoral venous bypass grafts    KIDNEY TRANSPLANT     [facility]     [facility]      PARATHYROIDECTOMY  2006    left parathyroid  implant and parathyroidectomy       Family History:  Family History   Problem Relation Age of Onset    Diabetes Mother     Diabetes Father     Dementia Father        Social History:  Social History     Socioeconomic History    Marital status:      Spouse name: Not on file    Number of children: Not on file    Years of education: Not on file    Highest education level: Not on file   Occupational History    Not on file   Tobacco Use    Smoking status: Former Smoker     Packs/day: 1.00     Years: 20.00     Pack years: 20.00     Quit date: 10/1/2015     Years since quittin.2    Smokeless tobacco: Never Used   Vaping Use    Vaping Use: Never used   Substance and Sexual Activity    Alcohol use: No     Comment: 2 cups coffee per day    Drug use: No    Sexual activity: Not on file   Other Topics Concern    Not on file   Social History Narrative    Not on file     Social Determinants of Health     Financial Resource Strain:     Difficulty of Paying Living Expenses: Not on file   Food Insecurity:     Worried About Running Out of Food in the Last Year: Not on file    [place] of Food in the Last Year: Not on file   Transportation Needs:     Lack of Transportation (Medical): Not on file    Lack of Transportation (Non-Medical): Not on file   Physical Activity:     Days of Exercise per Week: Not on file    Minutes of Exercise per Session: Not on file   Stress:     Feeling of Stress : Not on file   Social Connections:     Frequency of Communication with Friends and Family: Not on file    Frequency of Social Gatherings with Friends and Family: Not on file    Attends Denominational Services: Not on file    Active Member of [employer] or Organizations: Not on file    Attends Club or Organization Meetings: Not on file    Marital Status: Not on file   Intimate Partner Violence:     Fear of Current or Ex-Partner: Not on file    Emotionally Abused: Not on file    Physically Abused: Not on file    Sexually Abused: Not on file   Housing Stability:     Unable to Pay for Housing in the Last Year: Not on file    Number of [place] in the Last Year: Not on file    Unstable Housing in the Last Year: Not on file       Allergies: Allergies   Allergen Reactions    Macrodantin [Nitrofurantoin Macrocrystal]     Sulfa Antibiotics        Home Medications:  Prior to Admission medications    Medication Sig Start Date End Date Taking? Authorizing Provider   furosemide (LASIX) 20 MG tablet  12/22/21   Historical Provider, MD   lisinopril (PRINIVIL;ZESTRIL) 5 MG tablet take 1 tablet by mouth once daily 12/8/21   Historical Provider, MD   pregabalin (LYRICA) 75 MG capsule  12/26/21   Historical Provider, MD   nystatin (MYCOSTATIN) 531465 UNIT/GM powder Apply 3 times daily.  12/27/21   Kendrick KATINA Catherine - CNP   ipratropium-albuterol (DUONEB) 0.5-2.5 (3) MG/3ML SOLN nebulizer solution Inhale 3 mLs into the lungs every 6 hours as needed for Shortness of Breath 11/27/21   Young Ramirez MD   budesonide (PULMICORT) 0.5 MG/2ML nebulizer suspension Take 2 mLs by nebulization 2 times daily for 10 days 11/27/21 12/7/21  Lyndsay Garcia MD   ondansetron (ZOFRAN-ODT) 4 MG disintegrating tablet Take 1 tablet by mouth every 8 hours as needed for Nausea or Vomiting 11/27/21   Lyndsay Garcia MD   allopurinol (ZYLOPRIM) 100 MG tablet Take 100 mg by mouth daily    Historical Provider, MD   Cyanocobalamin (B-12 COMPLIANCE INJECTION) 1000 MCG/ML KIT Inject as directed monthly    Historical Provider, MD   metoprolol succinate (TOPROL XL) 50 MG extended release tablet take 1 tablet by mouth once daily 4/17/20   Bob Mtz MD   desvenlafaxine succinate (PRISTIQ) 50 MG TB24 extended release tablet Take 1 tablet by mouth every evening 12/20/19   Lyndsay Garcia MD   cloZAPine (CLOZARIL) 50 MG tablet Take 50 mg by mouth nightly    Historical Provider, MD   cycloSPORINE (SANDIMMUNE) 100 MG capsule Take 100 mg by mouth 2 times daily    Historical Provider, MD   predniSONE (DELTASONE) 5 MG tablet Take 5 mg by mouth daily    Historical Provider, MD   folic acid (FOLVITE) 1 MG tablet Take 1 mg by mouth daily    Historical Provider, MD   docusate sodium (COLACE) 100 MG capsule Take 100 mg by mouth 2 times daily    Historical Provider, MD   mycophenolate (CELLCEPT) 500 MG tablet Take 1,000 mg by mouth 2 times daily    Historical Provider, MD   simvastatin (ZOCOR) 20 MG tablet Take 20 mg by mouth nightly. Historical Provider, MD   levothyroxine (SYNTHROID) 50 MCG tablet Take 50 mcg by mouth daily.       Historical Provider, MD       Current Medications:  Current Facility-Administered Medications   Medication Dose Route Frequency Provider Last Rate Last Admin    meperidine (DEMEROL) injection 12.5 mg  12.5 mg IntraVENous Q5 Min PRN Kate Gr MD        morphine (PF) injection 2 mg  2 mg IntraVENous Q5 Min PRN Mariah Becker MD        HYDROmorphone (DILAUDID) injection 0.5 mg  0.5 mg IntraVENous Q5 Min PRN Mariah Becker MD        ondansetron [facility]) injection 4 mg  4 mg IntraVENous Once PRN Mariah Becker MD        promethazine [facility]) injection 6.25 mg  6.25 mg IntraMUSCular Once PRN Mariah Becker MD        labetalol (NORMODYNE;TRANDATE) injection 5 mg  5 mg IntraVENous Q10 Min PRN Mariah Becker MD        hydrALAZINE (APRESOLINE) injection 5 mg  5 mg IntraVENous Q10 Min PRN Mariah Becker MD        metronidazole (FLAGYL) 500 mg in NaCl 100 mL IVPB premix  500 mg IntraVENous Q8H Olga Smith MD   Stopped at 01/15/22 0800    linezolid (ZYVOX) IVPB 600 mg  600 mg IntraVENous Q12H Olga Smith MD   Stopped at 01/15/22 1031    heparin (porcine) injection 5,000 Units  5,000 Units SubCUTAneous BID Juan Mesa MD   5,000 Units at 01/14/22 2201    sodium chloride flush 0.9 % injection 5-40 mL  5-40 mL IntraVENous 2 times per day Juan Mesa MD   10 mL at 01/15/22 1029    sodium chloride flush 0.9 % injection 5-40 mL  5-40 mL IntraVENous PRN Susannah Ramirez MD        0.9 % sodium chloride infusion  25 mL IntraVENous PRN Juan Mesa MD        ondansetron (ZOFRAN-ODT) disintegrating tablet 4 mg  4 mg Oral Q8H PRN Juan Mesa MD   4 mg at 01/11/22 1806    Or    ondansetron (ZOFRAN) injection 4 mg  4 mg IntraVENous Q6H PRN Susannah Ramirez MD        polyethylene glycol (GLYCOLAX) packet 17 g  17 g Oral Daily PRN Juan Mesa MD   17 g at 01/13/22 1611    acetaminophen (TYLENOL) tablet 650 mg  650 mg Oral Q6H PRN Juan Mesa MD   650 mg at 01/11/22 1819    Or    acetaminophen (TYLENOL) suppository 650 mg  650 mg Rectal Q6H PRN Ayanna Ramirez MD        predniSONE (DELTASONE) tablet 5 mg  5 mg Oral Daily Jose Toussaint MD   5 mg at 01/13/22 9905    mycophenolate (CELLCEPT) capsule 500 mg  500 mg Oral BID Jose Toussaint MD   500 mg at 01/13/22 2133    sodium chloride flush 0.9 % injection 5-40 mL 5-40 mL IntraVENous 2 times per day Isabela Navarro MD   10 mL at 01/15/22 0849    sodium chloride flush 0.9 % injection 5-40 mL  5-40 mL IntraVENous PRN Susannah Ramirez MD        0.9 % sodium chloride infusion  25 mL IntraVENous PRN Isabela Navarro MD        heparin flush 100 UNIT/ML injection 100 Units  1 mL IntraVENous 2 times per day Isabela Navarro MD   100 Units at 01/14/22 2202    heparin flush 100 UNIT/ML injection 100 Units  1 mL IntraCATHeter PRN Isabela Navarro MD        cefepime (MAXIPIME) 2000 mg IVPB extended (mini-bag)  2,000 mg IntraVENous Q24H Caprice Grey MD   Stopped at 01/14/22 2121    allopurinol (ZYLOPRIM) tablet 100 mg  100 mg Oral Daily Isabela Navarro MD   100 mg at 01/13/22 0907    budesonide (PULMICORT) nebulizer suspension 500 mcg  500 mcg Nebulization BID Isabela Navarro MD   500 mcg at 01/15/22 9291    desvenlafaxine succinate (PRISTIQ) extended release tablet 50 mg  50 mg Oral QPM Isabela Navarro MD   50 mg at 01/13/22 1813    docusate sodium (COLACE) capsule 100 mg  100 mg Oral BID Isabela Navarro MD   100 mg at 72/05/25 4547    folic acid (FOLVITE) tablet 1 mg  1 mg Oral Daily Lakshmi Ramirez MD   1 mg at 01/13/22 0906    ipratropium-albuterol (DUONEB) nebulizer solution 3 mL  3 mL Inhalation Q6H PRN Isabela Navarro MD   3 mL at 01/15/22 0608    levothyroxine (SYNTHROID) tablet 50 mcg  50 mcg Oral Daily Isabela Navarro MD   50 mcg at 01/13/22 0525    metoprolol succinate (TOPROL XL) extended release tablet 50 mg  50 mg Oral Daily Isabela Navarro MD   50 mg at 01/13/22 0902    atorvastatin (LIPITOR) tablet 10 mg  10 mg Oral Daily Lakshmi Ramirez MD   10 mg at 01/13/22 2173    ondansetron (ZOFRAN-ODT) disintegrating tablet 4 mg  4 mg Oral Once Cathleen Jean Baptiste MD          sodium chloride      sodium chloride         Physical Exam:  BP (!) 140/62   Pulse 122   Temp 99.2 °F (37.3 °C) (Infrared)   Resp 28   Ht 5' 2\" (1.575 m)   Wt 218 lb 12.8 oz (99.2 kg) SpO2 95%   BMI 40.02 kg/m²   Wt Readings from Last 3 Encounters:   01/11/22 218 lb 12.8 oz (99.2 kg)   11/17/21 227 lb 3.2 oz (103.1 kg)   08/16/21 229 lb (103.9 kg)       Patient in OR    Intake/Output:    Intake/Output Summary (Last 24 hours) at 1/15/2022 1251  Last data filed at 1/15/2022 0931  Gross per 24 hour   Intake 0 ml   Output 575 ml   Net -575 ml     I/O this shift: In: 0   Out: 200 [Urine:200]    Laboratory Tests:  Recent Labs     01/13/22  0920 01/14/22  0928 01/15/22  0828    141 143   K 5.1* 4.8 4.5   * 109* 111*   CO2 20* 21* 20*   BUN 25* 28* 36*   CREATININE 1.6* 1.4* 1.5*   GLUCOSE 100* 114* 129*   CALCIUM 9.7 10.5* 10.4*     Lab Results   Component Value Date    MG 1.8 01/15/2022    MG 1.7 01/14/2022    MG 1.7 01/13/2022     Recent Labs     01/13/22  0920 01/14/22  0928 01/15/22  0828   ALKPHOS 117* 106* 106*   ALT 19 17 16   AST 20 15 18   PROT 5.6* 5.8* 5.7*   BILITOT 0.7 0.7 0.7   LABALBU 2.6* 2.6* 2.6*     Recent Labs     01/13/22  1529 01/14/22  0928 01/15/22  0828   WBC 17.9* 17.0* 20.2*   RBC 3.34* 3.51 3.63   HGB 10.5* 10.9* 11.3*   HCT 36.0 37.7 37.9   .8* 107.4* 104.4*   MCH 31.4 31.1 31.1   MCHC 29.2* 28.9* 29.8*   RDW 15.4* 15.5* 15.4*    179 192   MPV 13.6* 12.9* 13.0*     No results found for: CKTOTAL, CKMB, CKMBINDEX, TROPONINI  No results for input(s): CKTOTAL, CKMB, CKMBINDEX, TROPHS in the last 72 hours.   Lab Results   Component Value Date    INR 1.0 11/20/2021    INR 1.1 11/17/2021    PROTIME 11.5 11/20/2021    PROTIME 12.9 (H) 11/17/2021     Lab Results   Component Value Date    TSH 0.376 11/17/2021    TSH 0.807 12/22/2019     Lab Results   Component Value Date    LABA1C 5.7 (H) 11/17/2021     No results found for: EAG  No results found for: CHOL  No results found for: TRIG  No results found for: HDL  No results found for: LDLCALC, LDLCHOLESTEROL  No results found for: LABVLDL, VLDL  No results found for: CHOLHDLRATIO  No results for input(s): PROBNP in the last 72 hours. Cardiac Tests:      Echocardiogram reviewed: December 2019  Technically sub-optimal images.   Normal left ventricular chamber size.   Mild to moderate global hypokinesis, LVEF estimated about 35-38%. [address] left ventricular concentric hypertrophy noted.   There is doppler evidence of stage I diastolic dysfunction.   Normal left atrial pressure.   Left atrium is of normal size.   Interatrial septum not well visualized but appears intact.   Normal right ventricle structure and function.   No mitral valve prolapse.   Normal aortic root size.   No evidence of pericardial effusion.   Pericardium appears normal.   The inferior vena cava diameter is normal with decreased respiratory   variation.   No intracardiac mass.   Compared to prior study from 2017 - which showed EF 35-38% and mild MR. Stress test reviewed:      Cardiac catheterization reviewed:     CXR reviewed: The ASCVD Risk score (Sully Navarrete., et al., 2013) failed to calculate for the following reasons:    Cannot find a previous HDL lab    Cannot find a previous total cholesterol lab    ASSESSMENT / PLAN:    1. Pneumatosis of the colon  Management per primary service and surgery    2. CECILY (acute kidney injury) ([facility] status post transplanted kidney  Management per primary service and nephrology     3. Tachycardia  Likely due to underlying etiology of pneumatosis of the colon  Please treat underlying cause and replace electrolytes    4. chronic heart failure with systolic function  Continue metoprolol succinate as blood pressure allows and uptitrate for optimal rate control  Initiate Entresto after surgery and when kidney function resolves and blood pressure can tolerate  5. Hypertension  6. bipolar disorder  7. COPD  8. Hypothyroidism  9. Depression  10. Obesity  11. peripheral vascular disease status post femoral bypass in the past  12. chronic left bundle branch block  13. HLD, on a statin  14. thrombocytopenia   15. Obesity  16. Depression  17. Hypothyroidism  18. Hx parathyroidectomy  19. Hx clotting disorder with thrombophlebitis remotely with no Dvt on ultrasound  20. Former smoker           Thank you for allowing me to participate in your patient's care. Please feel free to contact me if you have any questions or concerns.     Brianne Chan MD  [facility]) Cardiology

## 2022-01-15 NOTE — PROGRESS NOTES
Progress Note  Date:1/15/2022       Room:0515/0515-02  Patient Jian Wheeler     YOB: 1955     Age:66 y.o. Subjective    Subjective:  Symptoms:  Worsening. (Pt very lethargic  , slightly opens eyes with calling her name , speech garbeled ). Diet:  NPO. Activity level: Impaired due to weakness. Pain:  She reports no pain. Review of Systems  Objective         Vitals Last 24 Hours:  TEMPERATURE:  Temp  Av.2 °F (36.8 °C)  Min: 97.8 °F (36.6 °C)  Max: 98.4 °F (36.9 °C)  RESPIRATIONS RANGE: Resp  Av.7  Min: 16  Max: 21  PULSE OXIMETRY RANGE: SpO2  Av.8 %  Min: 91 %  Max: 93 %  PULSE RANGE: Pulse  Av.7  Min: 108  Max: 118  BLOOD PRESSURE RANGE: Systolic (56LJM), HR , Min:131 , HENRIQUE:868   ; Diastolic (17GVF), TOT:25, Min:63, Max:78    I/O (24Hr): Intake/Output Summary (Last 24 hours) at 1/15/2022 0945  Last data filed at 1/15/2022 0931  Gross per 24 hour   Intake 0 ml   Output 575 ml   Net -575 ml     Objective:  General Appearance: In no acute distress (drowzy and lethargic  , and just moans  ,). Vital signs: (most recent): Blood pressure 132/70, pulse 118, temperature 98.4 °F (36.9 °C), temperature source Infrared, resp. rate 21, height 5' 2\" (1.575 m), weight 218 lb 12.8 oz (99.2 kg), SpO2 93 %. Vital signs are normal.    Output: Producing urine. HEENT: Normal HEENT exam.    Lungs: There are decreased breath sounds. Heart: Tachycardia. Regular rhythm. Positive for murmur. Abdomen: Abdomen is soft and distended. (Obese ). Bowel sounds are normal.   There is left upper quadrant tenderness. Extremities: (Edema +1 bilateral both LL and UL)  Neurological: (Very drowzy ). Pupils:  Pupils are equal, round, and reactive to light.       Labs/Imaging/Diagnostics    Labs:  CBC:  Recent Labs     22  1529 22  0928 01/15/22  0828   WBC 17.9* 17.0* 20.2*   RBC 3.34* 3.51 3.63   HGB 10.5* 10.9* 11.3*   HCT 36.0 37.7 37.9 .8* 107.4* 104.4*   RDW 15.4* 15.5* 15.4*    179 192     CHEMISTRIES:  Recent Labs     01/13/22  0920 01/14/22  0928 01/15/22  0828    141 143   K 5.1* 4.8 4.5   * 109* 111*   CO2 20* 21* 20*   BUN 25* 28* 36*   CREATININE 1.6* 1.4* 1.5*   GLUCOSE 100* 114* 129*   PHOS 3.5 3.2 2.3*   MG 1.7 1.7 1.8     PT/INR:No results for input(s): PROTIME, INR in the last 72 hours. APTT:No results for input(s): APTT in the last 72 hours. LIVER PROFILE:  Recent Labs     01/13/22  0920 01/14/22  0928 01/15/22  0828   AST 20 15 18   ALT 19 17 16   BILITOT 0.7 0.7 0.7   ALKPHOS 117* 106* 106*       Imaging Last 24 Hours:  CT ABDOMEN PELVIS WO CONTRAST Additional Contrast? None    Result Date: 1/10/2022  EXAMINATION: CT OF THE ABDOMEN AND PELVIS WITHOUT CONTRAST 1/10/2022 6:56 pm TECHNIQUE: CT of the abdomen and pelvis was performed without the administration of intravenous contrast. Multiplanar reformatted images are provided for review. Dose modulation, iterative reconstruction, and/or weight based adjustment of the mA/kV was utilized to reduce the radiation dose to as low as reasonably achievable. COMPARISON: CT abdomen pelvis 11/17/2021 HISTORY: ORDERING SYSTEM PROVIDED HISTORY: evaluate for diarrhea TECHNOLOGIST PROVIDED HISTORY: Reason for exam:->evaluate for diarrhea Additional Contrast?->None Decision Support Exception - unselect if not a suspected or confirmed emergency medical condition->Emergency Medical Condition (MA) FINDINGS: Lower Chest: Atherosclerotic disease. Atrophic kidneys with multiple cysts. Some of the presumed cysts appear hyperdense. Subcentimeter foci in the kidneys are too small to accurately characterize. There is a transplant kidney in the right lower quadrant. There is no hydronephrosis of the transplant kidney. Decreased perinephric stranding above the transplant kidney. Unremarkable bladder. Unremarkable appearance of the reproductive organs.  No abdominal aortic aneurysm. Atherosclerotic disease. No evidence of cholecystitis or pancreatitis. No free air or significant free fluid. No evidence of bowel obstruction. Nondilated appendix. No significant fluid within the large bowel. Small fat containing umbilical hernia. Mild degenerative changes of the spine. Presumed surgical scars in the anterior abdominal wall. No significant fluid within the large bowel. No hydronephrosis of the transplant kidneys. The native kidneys are atrophic and have multiple intermediate density cysts which should be further evaluated with nonemergent ultrasound. Atherosclerotic disease. XR CHEST PORTABLE    Result Date: 1/11/2022  EXAMINATION: ONE XRAY VIEW OF THE CHEST 1/11/2022 9:11 am COMPARISON: None. HISTORY: ORDERING SYSTEM PROVIDED HISTORY: shortness of breath TECHNOLOGIST PROVIDED HISTORY: Reason for exam:->shortness of breath FINDINGS: The heart is enlarged. There are no findings of failure. The lungs are clear. There is no focal infiltrate or effusion. 1. Stable cardiomegaly. There is no evidence of failure or pneumonia. Assessment//Plan           Hospital Problems           Last Modified POA    CECILY (acute kidney injury) ([facility] [address] 1/11/2022 Yes        Assessment:    Condition: In stable condition. Improving. (Sepsis immunocompromised host ,  Cefepime stopped 1/14 and vanc  X 1 dose afebrile now on zyvox , and flagyl   Urine enterococcus fecalis on zyvox , discussed with dr Adebayo Benavides   Blood culture negative  , urine negative no infection ,  resp film array negative ,  ? Source , but has been afebrile , but still  Very lethargic ,   Ct scan of the abdomen ? Enteritis , colonic distention r/o ischemic bowel ,pneumatosis , await surgery input  to evaluate for ischemic bowel , lactic acid normal     Somnolence , and delirium ?  Sepsis r/o ischemic bowel     Leukocytosis secondary to above ,      Acute on chronic renal failure , renal function better with hydration , d/c IV fluids creatinine upon admission was 2.5 , and today 1.5 , and baseline 1.2   S/p renal transplant in the past ,     HTN bp optimal  On metorpolol    Copd on aerosol treatment as needed     Bipolar controlled on  pristiq , hold clozaril    Hypothyroidism continue synthroid     called and discussed with son Preston Amanda ,  And discussed condition and not getting better , seems worsening , called and initiated transfer to [facility] and talked to the hospitalist there and accepted pt , but still waiting list for beds ,         ).        Electronically signed by Bautista Issa MD on 1/12/22 at 6:29 PM EST

## 2022-01-15 NOTE — PROGRESS NOTES
1411 bagged with ambu assisting with respirations   1415 connected to vent tidal volume 550 fio2 60 IMv12 peep 5   1420 suctioned for scant amt of clear mucus via tube  1430 imv decreased to 68 assisting rate of 20 O2 sat 92  1441 resp 16 o2 sat 94% attempting to stimulate to wake up. Eyes open does not follow commands or stays awake  1500 suctioned for scant amt will open briefly  1528 IMV decreased to 4 resp rate 20 with volumes 380 -420  1555 extubated placed on bipap 12/6 fio2 60% rate 20 O2 sat 92%dr carlson in constant attendance.   1605 o2 sat 92% resp rate 22

## 2022-01-15 NOTE — CONSULTS
General Surgery Consult    Patient's Name/Date of Birth: Escobar Nur / 1955    Date: January 15, 2022     Consulting Surgeon: Evelyn Abreu M.D.    PCP: Tama Goldberg, MD     Chief Complaint: abnormal CT    HPI:   Escobar Nur is a 73 y.o. female who presents for  evaluation of fatigue and sepsis and had CT done showing pneumatosis of the right colon and transverse and was initially resuscitated but has gotten progressively more tachycardic and more hypoxemic, she does not complain of pain but is very altered at this time.        Past Medical History:   Diagnosis Date    Abnormal EKG     left bundle branch block    Acute gout involving toe of right foot 9/3/2020    Acute gout involving toe of right foot 9/3/2020    Acute on chronic combined systolic (congestive) and diastolic (congestive) heart failure (HCC)     Cancer (HCC)     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    Cerebrovascular disease     Clotting disorder ([address] 1985    thrombophlebitis after c section delivery    Depression     15 years followed by dr Cristin Rubio Hyperlipidemia     Hypertension     Hyperthyroidism     Leg swelling 9/3/2020    Lymphedema of both lower extremities 9/3/2020    Peripheral vascular disease ([address]     Renal failure 11/2012    renal transplant    Thrombophlebitis     after c section delivery    Thyroid disease        Past Surgical History:   Procedure Laterality Date   [address]    one normal delivery    COLONOSCOPY      DIAGNOSTIC CARDIAC CATH LAB PROCEDURE  2006    normal coronaries and 1996 normal coronaries    DIALYSIS FISTULA CREATION  march and july 2012    phase one and two patient will start dialysis when needed   [address]    transfemoral venous bypass grafts    KIDNEY TRANSPLANT  2016    KIDNEY TRANSPLANT  2015   [address] [facility] [address]  2015    PARATHYROIDECTOMY  2006    left parathyroid  implant and parathyroidectomy       Current Facility-Administered Medications   Medication Dose Route Frequency Provider Last Rate Last Admin    metronidazole (FLAGYL) 500 mg in NaCl 100 mL IVPB premix  500 mg IntraVENous Q8H Garima Baker MD   Stopped at 01/15/22 0800    linezolid (ZYVOX) IVPB 600 mg  600 mg IntraVENous Q12H Garima Baker MD   Stopped at 01/15/22 1031    heparin (porcine) injection 5,000 Units  5,000 Units SubCUTAneous BID Jair Fitzgerald MD   5,000 Units at 01/14/22 2201    sodium chloride flush 0.9 % injection 5-40 mL  5-40 mL IntraVENous 2 times per day Jair Fitzgerald MD   10 mL at 01/15/22 1029    sodium chloride flush 0.9 % injection 5-40 mL  5-40 mL IntraVENous PRN Jair Fitzgerald MD        0.9 % sodium chloride infusion  25 mL IntraVENous PRN Jair Fitzgerald MD        ondansetron (ZOFRAN-ODT) disintegrating tablet 4 mg  4 mg Oral Q8H PRN Jair Fitzgerald MD   4 mg at 01/11/22 1806    Or    ondansetron (ZOFRAN) injection 4 mg  4 mg IntraVENous Q6H PRN Jair Fitzgerald MD        polyethylene glycol (GLYCOLAX) packet 17 g  17 g Oral Daily PRN Jair Fitzgerald MD   17 g at 01/13/22 1611    acetaminophen (TYLENOL) tablet 650 mg  650 mg Oral Q6H PRN Jair Fitzgerald MD   650 mg at 01/11/22 1819    Or    acetaminophen (TYLENOL) suppository 650 mg  650 mg Rectal Q6H PRN Jair Fitzgerald MD        predniSONE (DELTASONE) tablet 5 mg  5 mg Oral Daily Heraclio Foy MD   5 mg at 01/13/22 1247    mycophenolate (CELLCEPT) capsule 500 mg  500 mg Oral BID Heraclio Foy MD   500 mg at 01/13/22 2133    sodium chloride flush 0.9 % injection 5-40 mL  5-40 mL IntraVENous 2 times per day Jair Fitzgerald MD   10 mL at 01/15/22 0849    sodium chloride flush 0.9 % injection 5-40 mL  5-40 mL IntraVENous PRN Susannah Ramirez MD        0.9 % sodium chloride infusion  25 mL IntraVENous PRN Jair Fitzgerald MD        heparin flush 100 UNIT/ML injection 100 Units  1 mL IntraVENous 2 times per day Jair Fitzgerald MD   100 Units at 01/14/22 2202    heparin flush 100 UNIT/ML injection 100 Units  1 mL IntraCATHeter PRN Trista Ramirez MD        cefepime (MAXIPIME) 2000 mg IVPB extended (mini-bag)  2,000 mg IntraVENous Q24H Kaley Dang MD   Stopped at 01/14/22 2121    allopurinol (ZYLOPRIM) tablet 100 mg  100 mg Oral Daily Trista Ramirez MD   100 mg at 01/13/22 8659    budesonide (PULMICORT) nebulizer suspension 500 mcg  500 mcg Nebulization BID Paige Farrell MD   500 mcg at 01/15/22 5407    desvenlafaxine succinate (PRISTIQ) extended release tablet 50 mg  50 mg Oral QPM Susannah Ramirez MD   50 mg at 01/13/22 1813    docusate sodium (COLACE) capsule 100 mg  100 mg Oral BID Trista Ramirez MD   100 mg at 44/87/29 5054    folic acid (FOLVITE) tablet 1 mg  1 mg Oral Daily Trista Ramirez MD   1 mg at 01/13/22 0906    ipratropium-albuterol (DUONEB) nebulizer solution 3 mL  3 mL Inhalation Q6H PRN Paige Farrell MD   3 mL at 01/15/22 0608    levothyroxine (SYNTHROID) tablet 50 mcg  50 mcg Oral Daily Paige Farrell MD   50 mcg at 01/13/22 0653    metoprolol succinate (TOPROL XL) extended release tablet 50 mg  50 mg Oral Daily Trista Ramirez MD   50 mg at 01/13/22 6683    atorvastatin (LIPITOR) tablet 10 mg  10 mg Oral Daily Susannah Ramirez MD   10 mg at 01/13/22 7757    ondansetron (ZOFRAN-ODT) disintegrating tablet 4 mg  4 mg Oral Once Tarah Da Silva MD           Allergies   Allergen Reactions    Macrodantin [Nitrofurantoin Macrocrystal]     Sulfa Antibiotics        The patient has a family history that is negative for severe cardiovascular or respiratory issues, negative for reaction to anesthesia.     Social History     Socioeconomic History    Marital status:      Spouse name: Not on file    Number of children: Not on file    Years of education: Not on file    Highest education level: Not on file   Occupational History    Not on file   Tobacco Use    Smoking status: Former Smoker     Packs/day: 1.00     Years: 20.00     Pack years: 20.00     Quit date: 10/1/2015     Years since quittin.2    Smokeless tobacco: Never Used   Vaping Use    Vaping Use: Never used   Substance and Sexual Activity    Alcohol use: No     Comment: 2 cups coffee per day    Drug use: No    Sexual activity: Not on file   Other Topics Concern    Not on file   Social History Narrative    Not on file     Social Determinants of Health     Financial Resource Strain:     Difficulty of Paying Living Expenses: Not on file   Food Insecurity:     Worried About Running Out of Food in the Last Year: Not on file    Vannessa of Food in the Last Year: Not on file   Transportation Needs:     Lack of Transportation (Medical): Not on file    Lack of Transportation (Non-Medical):  Not on file   Physical Activity:     Days of Exercise per Week: Not on file    Minutes of Exercise per Session: Not on file   Stress:     Feeling of Stress : Not on file   Social Connections:     Frequency of Communication with Friends and Family: Not on file    Frequency of Social Gatherings with Friends and Family: Not on file    Attends Hinduism Services: Not on file    Active Member of [address] [place] [address] or Organizations: Not on file    Attends Club or Organization Meetings: Not on file    Marital Status: Not on file   Intimate Partner Violence:     Fear of Current or Ex-Partner: Not on file    Emotionally Abused: Not on file    Physically Abused: Not on file    Sexually Abused: Not on file   Housing Stability:     Unable to Pay for Housing in the Last Year: Not on file    Number of [place] in the Last Year: Not on file    Unstable Housing in the Last Year: Not on file           Review of Systems  Not done due to mental status   Physical exam:   BP (!) 140/62   Pulse 122   Temp 99.2 °F (37.3 °C) (Infrared)   Resp 28   Ht 5' 2\" (1.575 m)   Wt 218 lb 12.8 oz (99.2 kg)   SpO2 95%   BMI 40.02 kg/m²   General appearance:  NAD  Head: NCAT, PERRLA, EOMI, red conjunctiva  Neck: supple, no masses  Lungs: CTAB, equal chest rise bilateral  Heart: Reg rate  Abdomen: soft, nontender, mildly distended,  Skin; no lesions  Gu: no cva tenderness  Extremities: extremities normal, atraumatic, no cyanosis or edema      Radiology:  CT abdomen/pelvis: pneumatosis    Assessment:  73 y.o. female with ischemic bowel and sepsis    Plan: To OR for lap possible open resection with likely ostomy  Discussed the risk, benefits and alternatives of surgery including wound infections, bleeding, scar and hernia formation and the risks of general anesthetic including MI, CVA, sudden death or reactions to anesthetic medications. The patient understands the risks and alternatives and the possibility of converting to an open procedure. All questions were answered to the patient's son's satisfaction and they freely signed the consent.           Rommel Perez MD  1/15/2022  11:51 AM

## 2022-01-15 NOTE — FLOWSHEET NOTE
01/15/22 0800   Provider Notification   Reason for Communication Evaluate  (lathargic, lungs wet, MEWS 4, O2 increase 4 L)   Provider Name Dr Arely Huang   Provider Notification Physician   Method of Communication Face to face   Response Other (Comment)  (Dr cortez see new orders)

## 2022-01-15 NOTE — PROGRESS NOTES
[facility] MD Leighann Castaneda MD. Akanksha Chance MD   Progress Note    1/15/2022    SUBJECTIVE:   1/13:Seen in her room , lying flat on o2/nc , euvolemic , confused   D/w RN on floor   1/14: Patient was seen, interviewed and examined in her room, overall feeling much better with less confusion today. Discussed the case in detail with the nurse, patient had history of kidney transplant in 2015, has maintained of 2 medications including cyclosporine and MMF. We will check with lab to obtain values both medications random throughout toxicity levels that could be responsible for her confusion at one point or another. 1/15: Patient was seen in her room. Case discussed with her son was informing me that, she was scheduled for expiratory laparotomy for probable obstruction of her bowels as per general surgery. We will follow clinical status closely with surgery or any other intervention needed from nephrology. PROBLEM LIST:    Active Problems:    CECILY (acute kidney injury) ([address]    Ischemic bowel disease ([address]  Resolved Problems:    * No resolved hospital problems.  *       DIET:    Diet NPO       Allergies : Macrodantin [nitrofurantoin macrocrystal] and Sulfa antibiotics    Past Medical History:   Diagnosis Date    Abnormal EKG     left bundle branch block    Acute gout involving toe of right foot 9/3/2020    Acute gout involving toe of right foot 9/3/2020    Acute on chronic combined systolic (congestive) and diastolic (congestive) heart failure (HCC)     Cancer (HCC)     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    Cerebrovascular disease     Clotting disorder ([address] 1985    thrombophlebitis after c section delivery    Depression     15 years followed by dr Abeba Mcgowan    Hyperlipidemia     Hypertension     Hyperthyroidism     Leg swelling 9/3/2020    Lymphedema of both lower extremities 9/3/2020    Peripheral vascular disease ([address]     Renal failure 11/2012 renal transplant    Thrombophlebitis     after c section delivery    Thyroid disease        Past Surgical History:   Procedure Laterality Date     SECTION  1985    one normal delivery    COLONOSCOPY      DIAGNOSTIC CARDIAC CATH LAB PROCEDURE      normal coronaries and  normal coronaries    DIALYSIS FISTULA CREATION  march and 2012    phase one and two patient will start dialysis when needed   [address]    transfemoral venous bypass grafts    KIDNEY TRANSPLANT  2016   [facility]     [facility]  2015    PARATHYROIDECTOMY  2006    left parathyroid  implant and parathyroidectomy       Family History   Problem Relation Age of Onset    Diabetes Mother     Diabetes Father     Dementia Father         reports that she quit smoking about 6 years ago. She has a 20.00 pack-year smoking history. She has never used smokeless tobacco. She reports that she does not drink alcohol and does not use drugs. Review of Systems:   Constitutional: no fevers , no chills , feels ok   Eyes: no eye pain , no itching , no drainage  Ears, nose, mouth, throat, and face: no ear ,nose pain , hearing is ok ,no nasal drainage   Respiratory: no sob ,no cough ,no wheezing . Cardiovascular: no chest pain , no palpitation ,no sob . Gastrointestinal: no nausea, vomiting , constipation , no abdominal pain . Genitourinary:no urinary retention , no burning , dysuria . No polyuria   Hematologic/lymphatic: no bleeding , no cougulation issues . Musculoskeletal:no joint pain , no swelling . Neurological: no headaches ,no weakness , no numbness . Endocrine: no thirst , no weight issues .      MEDS (scheduled):    metroNIDAZOLE  500 mg IntraVENous Q8H    linezolid  600 mg IntraVENous Q12H    heparin (porcine)  5,000 Units SubCUTAneous BID    sodium chloride flush  5-40 mL IntraVENous 2 times per day    predniSONE  5 mg Oral Daily    mycophenolate  500 mg Oral BID    sodium chloride flush  5-40 mL IntraVENous 2 times per day    heparin flush  1 mL IntraVENous 2 times per day    cefepime  2,000 mg IntraVENous Q24H    allopurinol  100 mg Oral Daily    budesonide  500 mcg Nebulization BID    desvenlafaxine succinate  50 mg Oral QPM    docusate sodium  100 mg Oral BID    folic acid  1 mg Oral Daily    levothyroxine  50 mcg Oral Daily    metoprolol succinate  50 mg Oral Daily    atorvastatin  10 mg Oral Daily    ondansetron  4 mg Oral Once       MEDS (infusions):   sodium chloride      sodium chloride         MEDS (prn):  meperidine, morphine, HYDROmorphone, ondansetron, promethazine, labetalol, hydrALAZINE, bupivacaine-EPINEPHrine PF, sodium chloride flush, sodium chloride, ondansetron **OR** ondansetron, polyethylene glycol, acetaminophen **OR** acetaminophen, sodium chloride flush, sodium chloride, heparin flush, ipratropium-albuterol    PHYSICAL EXAM:     Patient Vitals for the past 24 hrs:   BP Temp Temp src Pulse Resp SpO2   01/15/22 1028 -- -- -- -- -- 95 %   01/15/22 1025 -- 99.2 °F (37.3 °C) Infrared -- -- 91 %   01/15/22 1019 (!) 140/62 100.7 °F (38.2 °C) Infrared 122 28 (!) 88 %   01/15/22 0758 -- -- -- -- -- 93 %   01/15/22 0751 132/70 98.4 °F (36.9 °C) Infrared 118 21 91 %   01/14/22 2236 133/63 97.8 °F (36.6 °C) Temporal 118 16 92 %   01/14/22 1608 131/78 98.4 °F (36.9 °C) Axillary 108 16 91 %   @      Intake/Output Summary (Last 24 hours) at 1/15/2022 1434  Last data filed at 1/15/2022 1427  Gross per 24 hour   Intake 700 ml   Output 580 ml   Net 120 ml         Wt Readings from Last 3 Encounters:   01/11/22 218 lb 12.8 oz (99.2 kg)   11/17/21 227 lb 3.2 oz (103.1 kg)   08/16/21 229 lb (103.9 kg)       Constitutional:  in no acute distress  Oral: mucus membranes moist  Neck: no JVD  Cardiovascular: S1, S2 regular rhythm, no murmur,or rub  Respiratory:  No crackles, no wheeze  Gastrointestinal:  Soft, nontender, nondistended, NABS  Ext: no edema, feet warm  Skin: dry, no rash  Neuro: awake, alert, interactive      DATA:    Recent Labs     01/13/22  1529 01/14/22  0928 01/15/22  0828   WBC 17.9* 17.0* 20.2*   HGB 10.5* 10.9* 11.3*   HCT 36.0 37.7 37.9   .8* 107.4* 104.4*    179 192     Recent Labs     01/13/22  0920 01/14/22  0928 01/15/22  0828    141 143   K 5.1* 4.8 4.5   * 109* 111*   CO2 20* 21* 20*   MG 1.7 1.7 1.8   PHOS 3.5 3.2 2.3*   BUN 25* 28* 36*   CREATININE 1.6* 1.4* 1.5*   ALT 19 17 16   AST 20 15 18   BILITOT 0.7 0.7 0.7   ALKPHOS 117* 106* 106*       No results found for: LABPROT    ASSESSMENT / RECOMMENDATIONS:      1. Acute kidney injury, felt to be related to volume depletion and  consists of diarrhea and poor p.o. intake. CECILY resolving with BUN today of 28 and creatinine down to 1.4.  2.  Chronic kidney disease, stage III. The patient with kidney  transplant. Baseline creatinine 1.5.  3. Immunosuppressant host, chronically on cyclosporin 100 mg b.i.d.,  CellCept 1000 mg b.i.d., and prednisone 5 mg. 4.  Sepsis. Need to rule out a C. diff. infection as the patient is  reporting diarrhea. 5.  Obesity. 6.  Hypotension.     PLAN:    Cr improving   Confused and ammonia high . Started on lactulose  Improving confusion, scheduled for CAT scan of the abdomen this afternoon    1. Continue current IV fluid in the form of normal saline at 60 mL an  hour. 2.  continue cyclosporin 100 mg b.i.d.  3.  continue prednisone 5 mg daily. cut CellCept to 500 mg bid  due to concern of sepsis . 5.  Hold lisinopril for now. 6.  We will follow along with you. Will be rounding on the patient on daily basis and reviewing labs and all the consultants input.     Electronically signed by Swetha Headley MD on 1/15/2022 at 2:34 PM

## 2022-01-16 ENCOUNTER — APPOINTMENT (OUTPATIENT)
Dept: GENERAL RADIOLOGY | Age: 67
DRG: 673 | End: 2022-01-16
Payer: MEDICARE

## 2022-01-16 ENCOUNTER — APPOINTMENT (OUTPATIENT)
Dept: MRI IMAGING | Age: 67
DRG: 673 | End: 2022-01-16
Payer: MEDICARE

## 2022-01-16 LAB
BLOOD CULTURE, ROUTINE: NORMAL
EPSTEIN-BARR VCA IGM: 14.2 U/ML (ref 0–43.9)

## 2022-01-16 PROCEDURE — 2060000000 HC ICU INTERMEDIATE R&B

## 2022-01-16 PROCEDURE — 2500000003 HC RX 250 WO HCPCS: Performed by: SPECIALIST

## 2022-01-16 PROCEDURE — 6360000002 HC RX W HCPCS: Performed by: SPECIALIST

## 2022-01-16 PROCEDURE — 97530 THERAPEUTIC ACTIVITIES: CPT

## 2022-01-16 PROCEDURE — 6370000000 HC RX 637 (ALT 250 FOR IP): Performed by: INTERNAL MEDICINE

## 2022-01-16 PROCEDURE — 94640 AIRWAY INHALATION TREATMENT: CPT

## 2022-01-16 PROCEDURE — 2700000000 HC OXYGEN THERAPY PER DAY

## 2022-01-16 PROCEDURE — 2580000003 HC RX 258: Performed by: INTERNAL MEDICINE

## 2022-01-16 PROCEDURE — 6360000002 HC RX W HCPCS: Performed by: INTERNAL MEDICINE

## 2022-01-16 PROCEDURE — 74018 RADEX ABDOMEN 1 VIEW: CPT

## 2022-01-16 PROCEDURE — 99024 POSTOP FOLLOW-UP VISIT: CPT | Performed by: SURGERY

## 2022-01-16 PROCEDURE — 97110 THERAPEUTIC EXERCISES: CPT

## 2022-01-16 PROCEDURE — 99233 SBSQ HOSP IP/OBS HIGH 50: CPT | Performed by: INTERNAL MEDICINE

## 2022-01-16 PROCEDURE — 2580000003 HC RX 258: Performed by: SPECIALIST

## 2022-01-16 PROCEDURE — 94660 CPAP INITIATION&MGMT: CPT

## 2022-01-16 PROCEDURE — 70551 MRI BRAIN STEM W/O DYE: CPT

## 2022-01-16 RX ORDER — METOPROLOL TARTRATE 50 MG/1
50 TABLET, FILM COATED ORAL 2 TIMES DAILY
Status: DISCONTINUED | OUTPATIENT
Start: 2022-01-16 | End: 2022-01-25 | Stop reason: HOSPADM

## 2022-01-16 RX ORDER — OXYMETAZOLINE HYDROCHLORIDE 0.05 G/100ML
2 SPRAY NASAL ONCE
Status: COMPLETED | OUTPATIENT
Start: 2022-01-16 | End: 2022-01-16

## 2022-01-16 RX ORDER — MYCOPHENOLATE MOFETIL 200 MG/ML
500 POWDER, FOR SUSPENSION ORAL 2 TIMES DAILY
Status: DISCONTINUED | OUTPATIENT
Start: 2022-01-16 | End: 2022-01-17

## 2022-01-16 RX ORDER — LIDOCAINE HYDROCHLORIDE 20 MG/ML
JELLY TOPICAL ONCE
Status: COMPLETED | OUTPATIENT
Start: 2022-01-16 | End: 2022-01-16

## 2022-01-16 RX ADMIN — MYCOPHENOLATE MOFETIL 500 MG: 200 POWDER, FOR SUSPENSION ORAL at 15:52

## 2022-01-16 RX ADMIN — LEVOTHYROXINE SODIUM 50 MCG: 0.05 TABLET ORAL at 15:51

## 2022-01-16 RX ADMIN — BUDESONIDE 500 MCG: 0.5 INHALANT RESPIRATORY (INHALATION) at 06:22

## 2022-01-16 RX ADMIN — PREDNISONE 5 MG: 5 TABLET ORAL at 15:51

## 2022-01-16 RX ADMIN — METOPROLOL TARTRATE 50 MG: 50 TABLET, FILM COATED ORAL at 15:52

## 2022-01-16 RX ADMIN — BUDESONIDE 500 MCG: 0.5 INHALANT RESPIRATORY (INHALATION) at 17:33

## 2022-01-16 RX ADMIN — CEFEPIME HYDROCHLORIDE 2000 MG: 2 INJECTION, POWDER, FOR SOLUTION INTRAVENOUS at 21:57

## 2022-01-16 RX ADMIN — DOCUSATE SODIUM 100 MG: 50 LIQUID ORAL at 15:50

## 2022-01-16 RX ADMIN — OXYMETAZOLINE HYDROCHLORIDE 2 SPRAY: 0.05 SPRAY NASAL at 11:04

## 2022-01-16 RX ADMIN — LINEZOLID 600 MG: 600 INJECTION, SOLUTION INTRAVENOUS at 20:46

## 2022-01-16 RX ADMIN — METRONIDAZOLE 500 MG: 500 INJECTION, SOLUTION INTRAVENOUS at 22:00

## 2022-01-16 RX ADMIN — LIDOCAINE HYDROCHLORIDE: 20 JELLY TOPICAL at 11:01

## 2022-01-16 RX ADMIN — MYCOPHENOLATE MOFETIL 500 MG: 200 POWDER, FOR SUSPENSION ORAL at 22:19

## 2022-01-16 RX ADMIN — HEPARIN SODIUM 5000 UNITS: 5000 INJECTION INTRAVENOUS; SUBCUTANEOUS at 21:59

## 2022-01-16 RX ADMIN — ALLOPURINOL 100 MG: 100 TABLET ORAL at 15:52

## 2022-01-16 RX ADMIN — METRONIDAZOLE 500 MG: 500 INJECTION, SOLUTION INTRAVENOUS at 15:53

## 2022-01-16 RX ADMIN — LINEZOLID 600 MG: 600 INJECTION, SOLUTION INTRAVENOUS at 07:20

## 2022-01-16 RX ADMIN — METRONIDAZOLE 500 MG: 500 INJECTION, SOLUTION INTRAVENOUS at 06:16

## 2022-01-16 RX ADMIN — FOLIC ACID 1 MG: 1 TABLET ORAL at 15:50

## 2022-01-16 RX ADMIN — ATORVASTATIN CALCIUM 10 MG: 10 TABLET, FILM COATED ORAL at 15:52

## 2022-01-16 RX ADMIN — HEPARIN SODIUM 5000 UNITS: 5000 INJECTION INTRAVENOUS; SUBCUTANEOUS at 11:35

## 2022-01-16 RX ADMIN — IPRATROPIUM BROMIDE AND ALBUTEROL SULFATE 3 ML: .5; 3 SOLUTION RESPIRATORY (INHALATION) at 06:22

## 2022-01-16 RX ADMIN — SODIUM CHLORIDE 25 ML: 9 INJECTION, SOLUTION INTRAVENOUS at 00:35

## 2022-01-16 RX ADMIN — HEPARIN 100 UNITS: 100 SYRINGE at 21:57

## 2022-01-16 RX ADMIN — METOPROLOL TARTRATE 50 MG: 50 TABLET, FILM COATED ORAL at 22:01

## 2022-01-16 RX ADMIN — SODIUM CHLORIDE, PRESERVATIVE FREE 10 ML: 5 INJECTION INTRAVENOUS at 21:57

## 2022-01-16 NOTE — PROGRESS NOTES
[facility]    [address] [place] [address] 80  [place], [address]  Phone [phone]   [phone]      Admit Date: 1/10/2022  6:12 PM  Pt Name: Kuldeep Zimmer  MRN: [id number]  : 1955  Reason for Consult:    Chief Complaint   Patient presents with    Fatigue     patient attempted to go to pcp today and didnt have the strength. came here instead. patient states sepsis prior to thanksgiving requiring admission and rehab. Requesting Physician:  Juan Mesa MD  PCP: Juan Mesa MD  History Obtained From:  patient, chart   ID consulted for CECILY (acute kidney injury) ([address] [N17.9]  on hospital day [address] [facility]       Chief Complaint   Patient presents with    Fatigue     patient attempted to go to pcp today and didnt have the strength. came here instead. patient states sepsis prior to thanksgiving requiring admission and rehab. HISTORYOF PRESENT ILLNESS   Kuldeep Zimmer is a 73 y.o. female who presents with   has a past medical history of Abnormal EKG, Acute gout involving toe of right foot, Acute gout involving toe of right foot, Acute on chronic combined systolic (congestive) and diastolic (congestive) heart failure ([address] Cancer ([address] Cerebrovascular disease, Clotting disorder ([address] Depression, Hyperlipidemia, Hypertension, Hyperthyroidism, Leg swelling, Lymphedema of both lower extremities, Peripheral vascular disease ([address] Renal failure, Thrombophlebitis, and Thyroid disease. Fatigue  Associated symptoms include fatigue.      PT FOLLOWED UP WITH ID ON  S/P ATBX  STARTED ON FLUCONAZOLE/MYCOSTSATIN POWDER FOR INTERTRIGO   PT COMES IN NOW WITH FATIGUE DEC APPETITE NAUSEA   TMAX99.4 ON RA  CR2.5 WBC17.9  COVID NEG   BLOOD CX PENDING   ATBX was initiated   Not interactive  denies f/c  Just fatigue    Dos  22   ptis in bed supine 6L afebrile   Barely wakes up   Wbc20.2 cr1.5    Urine cx E faecalis   Blood cx ngtd S/p PROCEDURE:  Diagnostic laparoscopy with lysis of adhesions. No signs of ischemia. Intra-abdominal  adhesions. Minimal intra-abdominal fluid. No other signs of  intra-abdominal pathology.     REVIEW OF SYSTEMS     CONSTITUTIONAL:   as in hpi     CURRENT MEDICATIONS     Current Facility-Administered Medications:     metoprolol tartrate (LOPRESSOR) tablet 50 mg, 50 mg, Oral, BID, Susannah Ramirez MD    docusate (COLACE) 50 MG/5ML liquid 100 mg, 100 mg, Oral, BID, Noreen Ramirez MD    mycophenolate (CELLCEPT) 200 MG/ML suspension 500 mg, 500 mg, Oral, BID, Angelica Denies MD    metronidazole (FLAGYL) 500 mg in NaCl 100 mL IVPB premix, 500 mg, IntraVENous, Q8H, Sandra Keenan MD, Stopped at 01/16/22 0720    linezolid (ZYVOX) IVPB 600 mg, 600 mg, IntraVENous, Q12H, Sandra Keenan MD, Stopped at 01/16/22 0830    heparin (porcine) injection 5,000 Units, 5,000 Units, SubCUTAneous, BID, Karyna Segovia MD, 5,000 Units at 01/16/22 1135    ondansetron (ZOFRAN-ODT) disintegrating tablet 4 mg, 4 mg, Oral, Q8H PRN, 4 mg at 01/11/22 1806 **OR** ondansetron (ZOFRAN) injection 4 mg, 4 mg, IntraVENous, Q6H PRN, Noreen Ramirez MD    polyethylene glycol (GLYCOLAX) packet 17 g, 17 g, Oral, Daily PRN, Noreen Ramirez MD, 17 g at 01/13/22 1611    acetaminophen (TYLENOL) tablet 650 mg, 650 mg, Oral, Q6H PRN, 650 mg at 01/11/22 1819 **OR** acetaminophen (TYLENOL) suppository 650 mg, 650 mg, Rectal, Q6H PRN, Noreen Ramirez MD    predniSONE (DELTASONE) tablet 5 mg, 5 mg, Oral, Daily, Angelica Denise MD, 5 mg at 01/13/22 0906    sodium chloride flush 0.9 % injection 5-40 mL, 5-40 mL, IntraVENous, PRN, Susannah Ramirez MD    0.9 % sodium chloride infusion, 25 mL, IntraVENous, PRN, Karyna Segovia MD, Last Rate: 100 mL/hr at 01/16/22 0035, 25 mL at 01/16/22 0035    heparin flush 100 UNIT/ML injection 100 Units, 1 mL, IntraVENous, 2 times per day, Karyna Segovia MD, 100 Units at 01/15/22 1351    heparin flush 100 UNIT/ML injection 100 Units, 1 mL, IntraCATHeter, PRN, Sonia Ramirez MD    cefepime (MAXIPIME) 2000 mg IVPB extended (mini-bag), 2,000 mg, IntraVENous, Q24H, Haider Dutta MD, Stopped at 01/16/22 0215    allopurinol (ZYLOPRIM) tablet 100 mg, 100 mg, Oral, Daily, Susannah Ramirez MD, 100 mg at 01/13/22 0907    budesonide (PULMICORT) nebulizer suspension 500 mcg, 500 mcg, Nebulization, BID, Sonia Ramirez MD, 500 mcg at 01/16/22 6927    desvenlafaxine succinate (PRISTIQ) extended release tablet 50 mg, 50 mg, Oral, QPM, Susannah Ramirez MD, 50 mg at 05/99/30 8015    folic acid (FOLVITE) tablet 1 mg, 1 mg, Oral, Daily, Merlyn Holly MD, 1 mg at 01/13/22 0906    ipratropium-albuterol (DUONEB) nebulizer solution 3 mL, 3 mL, Inhalation, Q6H PRN, Merlyn Holly MD, 3 mL at 01/16/22 0622    levothyroxine (SYNTHROID) tablet 50 mcg, 50 mcg, Oral, Daily, Merlyn Holly MD, 50 mcg at 01/13/22 0907    atorvastatin (LIPITOR) tablet 10 mg, 10 mg, Oral, Daily, Susannah Ramirez MD, 10 mg at 01/13/22 6419    ondansetron (ZOFRAN-ODT) disintegrating tablet 4 mg, 4 mg, Oral, Once, Yovani Martinez MD  ALLERGIES     Macrodantin [nitrofurantoin macrocrystal] and Sulfa antibiotics  Immunization History   Administered Date(s) Administered    COVID-19, [place] Peter, PF, 30mcg/0.3mL 01/28/2021, 02/18/2021, 08/18/2021      Internal Administration   First Dose COVID-19, Pfizer, PF, 30mcg/0.3mL  01/28/2021   Second Dose COVID-19, Pfizer, PF, 30mcg/0.3mL   02/18/2021       Last COVID Lab SARS-CoV-2 (no units)   Date Value   01/20/2021 Not Detected     SARS-CoV-2 Antibody, Total (no units)   Date Value   01/13/2022 Non-Reactive     SARS-CoV-2, PCR (no units)   Date Value   01/11/2022 Not Detected     SARS-CoV-2, NAAT (no units)   Date Value   01/10/2022 Not Detected            PAST MEDICAL HISTORY     Past Medical History:   Diagnosis Date    Abnormal EKG     left bundle branch block    Acute gout involving toe of right foot 9/3/2020    Acute gout involving toe of right foot 9/3/2020    Acute on chronic combined systolic (congestive) and diastolic (congestive) heart failure (HCC)     Cancer (HCC)     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    Cerebrovascular disease     Clotting disorder ([address] 1985    thrombophlebitis after c section delivery    Depression     15 years followed by dr Zaida Valenzuela Hyperlipidemia     Hypertension     Hyperthyroidism     Leg swelling 9/3/2020    Lymphedema of both lower extremities 9/3/2020    Peripheral vascular disease ([address]     Renal failure 11/2012    renal transplant    Thrombophlebitis     after c section delivery    Thyroid disease      SURGICAL HISTORY       Past Surgical History:   Procedure Laterality Date   [address]    one normal delivery    COLONOSCOPY      DIAGNOSTIC CARDIAC CATH LAB PROCEDURE  2006    normal coronaries and 1996 normal coronaries    DIALYSIS FISTULA CREATION  march and july 2012    phase one and two patient will start dialysis when needed   [address]    transfemoral venous bypass grafts    KIDNEY TRANSPLANT  2016   [facility]  2015    KIDNEY TRANSPLANT  2015    PARATHYROIDECTOMY  2006    left parathyroid  implant and parathyroidectomy     PHYSICAL EXAM       Vitals:    01/16/22 0935 01/16/22 1000 01/16/22 1045 01/16/22 1200   BP:       Pulse:       Resp:       Temp:       TempSrc:       SpO2: 98% 94% 95% 96%   Weight:       Height:         Physical Exam   Constitutional/General:  NAD IN BED  Head: NC/AT  Eyes: PERRL, EOMI  Mouth: Normal mucosa, no thrush   Neck: Supple, full ROM,    Pulmonary: Lungs  DEc   to auscultation bilaterally ant . Not in respiratory distress on o2   Cardiovascular:  Regular rate and rhythm   Abdomen: Soft, dec  BS. distension. ng  Extremities: Moves all extremities x 4.    Warm and well perfused  Pulses:  Distal pulses dec   Skin: Warm and dry without rash  Neurologic:    lethargic   RIGHT MIDLINE  MERCHANT      DIAGNOSTIC RESULTS   RADIOLOGY:   CT ABDOMEN PELVIS WO CONTRAST Additional Contrast? None    Result Date: 1/10/2022  EXAMINATION: CT OF THE ABDOMEN AND PELVIS WITHOUT CONTRAST 1/10/2022 6:56 pm TECHNIQUE: CT of the abdomen and pelvis was performed without the administration of intravenous contrast. Multiplanar reformatted images are provided for review. Dose modulation, iterative reconstruction, and/or weight based adjustment of the mA/kV was utilized to reduce the radiation dose to as low as reasonably achievable. COMPARISON: CT abdomen pelvis 11/17/2021 HISTORY: ORDERING SYSTEM PROVIDED HISTORY: evaluate for diarrhea TECHNOLOGIST PROVIDED HISTORY: Reason for exam:->evaluate for diarrhea Additional Contrast?->None Decision Support Exception - unselect if not a suspected or confirmed emergency medical condition->Emergency Medical Condition (MA) FINDINGS: Lower Chest: Atherosclerotic disease. Atrophic kidneys with multiple cysts. Some of the presumed cysts appear hyperdense. Subcentimeter foci in the kidneys are too small to accurately characterize. There is a transplant kidney in the right lower quadrant. There is no hydronephrosis of the transplant kidney. Decreased perinephric stranding above the transplant kidney. Unremarkable bladder. Unremarkable appearance of the reproductive organs. No abdominal aortic aneurysm. Atherosclerotic disease. No evidence of cholecystitis or pancreatitis. No free air or significant free fluid. No evidence of bowel obstruction. Nondilated appendix. No significant fluid within the large bowel. Small fat containing umbilical hernia. Mild degenerative changes of the spine. Presumed surgical scars in the anterior abdominal wall. No significant fluid within the large bowel. No hydronephrosis of the transplant kidneys.   The native kidneys are atrophic and have multiple intermediate density cysts which should be further evaluated with nonemergent ultrasound. Atherosclerotic disease. XR CHEST PORTABLE    Result Date: 1/11/2022  EXAMINATION: ONE XRAY VIEW OF THE CHEST 1/11/2022 9:11 am COMPARISON: None. HISTORY: ORDERING SYSTEM PROVIDED HISTORY: shortness of breath TECHNOLOGIST PROVIDED HISTORY: Reason for exam:->shortness of breath FINDINGS: The heart is enlarged. There are no findings of failure. The lungs are clear. There is no focal infiltrate or effusion. 1. Stable cardiomegaly. There is no evidence of failure or pneumonia. LABS  Recent Labs     01/13/22  1529 01/14/22  0928 01/15/22  0828   WBC 17.9* 17.0* 20.2*   HGB 10.5* 10.9* 11.3*   HCT 36.0 37.7 37.9   .8* 107.4* 104.4*    179 192     Recent Labs     01/14/22 0928 01/14/22 0928 01/15/22  0828     --  143   K 4.8   < > 4.5   *   < > 111*   CO2 21*   < > 20*   BUN 28*  --  36*   CREATININE 1.4*  --  1.5*   GFRAA 46  --  42   LABGLOM 38  --  35   GLUCOSE 114*  --  129*   PROT 5.8*  --  5.7*   LABALBU 2.6*  --  2.6*   CALCIUM 10.5*  --  10.4*   BILITOT 0.7  --  0.7   ALKPHOS 106*  --  106*   AST 15  --  18   ALT 17  --  16    < > = values in this interval not displayed. No results for input(s): PROCAL in the last 72 hours.   Lab Results   Component Value Date    CRP 34.6 (H) 11/17/2021     Lab Results   Component Value Date    SEDRATE 62 (H) 11/20/2021     No results found for: [id number]  Lab Results   Component Value Date    COVID19 Non-Reactive 01/13/2022    COVID19 Not Detected 01/11/2022     COVID-19/MARINA-COV2 LABS  Recent Labs     01/14/22  0928 01/15/22  0828   AST 15 18   ALT 17 16           MICROBIOLOGY:          Lab Results   Component Value Date    BC 24 Hours no growth 01/11/2022    BC 5 Days no growth 11/17/2021    BC  12/17/2019     Refer to previous culture of CXBLD from 12-17-19 @1120 for  susceptibility results      ORG Enterococcus faecalis 01/12/2022    ORG Klebsiella pneumoniae ssp pneumoniae 11/17/2021    ORG Staphylococcus coagulase-negative 11/17/2021    ORG Klebsiella pneumoniae ssp pneumoniae 11/17/2021     Lab Results   Component Value Date    BLOODCULT2 24 Hours no growth 01/12/2022    BLOODCULT2  11/17/2021     This organism was isolated in one set. Susceptibility testing is not routinely done as this  organism frequently represents skin contamination. Additional testing can be ordered by calling the  Microbiology Department. ORG Enterococcus faecalis 01/12/2022    ORG Klebsiella pneumoniae ssp pneumoniae 11/17/2021    ORG Staphylococcus coagulase-negative 11/17/2021    ORG Klebsiella pneumoniae ssp pneumoniae 11/17/2021        Urine Culture, Routine   Date Value Ref Range Status   01/12/2022 >100,000 CFU/ml  Final   11/17/2021 <10,000 CFU/mL  Mixed gram positive organisms   (A)  Final   11/17/2021 >100,000 CFU/ml  Final     MRSA Culture Only   Date Value Ref Range Status   11/17/2021 Methicillin resistant Staph aureus not isolated  Final          FINAL IMPRESSION    Patient is a 73 y.o. female who presented with   Chief Complaint   Patient presents with    Fatigue     patient attempted to go to pcp today and didnt have the strength. came here instead. patient states sepsis prior to thanksgiving requiring admission and rehab.    and admitted for CECILY (acute kidney injury) ([facility] [N17.9]   IMMUNOCOMPROMISED PT RENAL TRANSPLANT  LEUKOCYTOSIS  pneumatosis involving the ascending to transverse colon. S/p BOWEL RESECTION LAPAROSCOPIC  Lysis of adhesions  Enterococcus faecalis   uti    ?  HCAP   CECILY         S/P RX KLEBSIELLA/CONS BACTEREMIA  S/P RX E COLI UTI           mycophenolate (CELLCEPT) 200 MG/ML suspension 500 mg, BID  metronidazole (FLAGYL) 500 mg in NaCl 100 mL IVPB premix, Q8H  linezolid (ZYVOX) IVPB 600 mg, Q12   cefepime (MAXIPIME) 2000 mg IVPB extended (mini-bag), Q24H     Cont atbx for now plan 8-10 days        Imaging and labs were reviewed per medical records       Thank you for involving me in the care of Sandra Collins. Please do not hesitate to call for any questions or concerns.          Electronically signed by Leeanne Sanchez MD on 1/16/2022 at 2:00 PM

## 2022-01-16 NOTE — PROGRESS NOTES
Surgery Progress Note            Chief complaint:   Chief Complaint   Patient presents with    Fatigue     patient attempted to go to pcp today and didnt have the strength. came here instead. patient states sepsis prior to thanksgiving requiring admission and rehab.       Patient Active Problem List   Diagnosis    Peripheral vascular disease ([facility]    Depression    Clotting disorder ([address]    Abnormal EKG    Cancer ([facility]    Over weight    S/p cadaver renal transplant    ESRD (end stage renal disease) ([facility]    Non morbid obesity due to excess calories    Hypertension    Leg swelling    Lymphedema of both lower extremities    Acute gout involving toe of right foot    Sepsis secondary to UTI ([address]    Acute kidney injury superimposed on CKD ([address]    Thyroid disease    Acute on chronic combined systolic (congestive) and diastolic (congestive) heart failure ([address]    Sepsis ([address]    Acute renal failure ([address]    CECILY (acute kidney injury) ([facility]    Ischemic bowel disease ([facility]       S: no acute changes, less arousable    O:   Vitals:    01/16/22 0734   BP: 132/61   Pulse: 103   Resp: 19   Temp: 98 °F (36.7 °C)   SpO2: 97%       Intake/Output Summary (Last 24 hours) at 1/16/2022 0854  Last data filed at 1/16/2022 0610  Gross per 24 hour   Intake 801 ml   Output 755 ml   Net 46 ml           Labs:  Lab Results   Component Value Date    WBC 20.2 01/15/2022    WBC 17.0 01/14/2022    WBC 17.9 01/13/2022    HGB 11.3 01/15/2022    HGB 10.9 01/14/2022    HGB 10.5 01/13/2022    HCT 37.9 01/15/2022    HCT 37.7 01/14/2022    HCT 36.0 01/13/2022     Lab Results   Component Value Date    CREATININE 1.5 (H) 01/15/2022    BUN 36 (H) 01/15/2022     01/15/2022    K 4.5 01/15/2022     (H) 01/15/2022    CO2 20 (L) 01/15/2022     Lab Results   Component Value Date    LIPASE 19 11/17/2021         Physical exam:   /61   Pulse 103   Temp 98 °F (36.7 °C) (Infrared)   Resp 19   Ht 5' 2\" (1.575 m)   Wt 218 lb 12.8 oz (99.2 kg)   SpO2 97%   BMI 40.02 kg/m²   General appearance: NAD  Head: NCAT  Neck: supple, no masses  Lungs: equal chest rise bilateral  Heart: S1S2 present  Abdomen: soft, nontender, nondistended,  Incisions clean and intact  Skin; no lesions  Gu: no cva tenderness  Extremities: extremities normal, atraumatic, no cyanosis or edema    A:  POD # 1 diag lap with lysis of adhesions, no pathology found otherwise    P: diet when able, no further recommendations    Vincent Jose MD, MD  1/16/2022

## 2022-01-16 NOTE — PROGRESS NOTES
INPATIENT CARDIOLOGY FOLLOW-UP VISIT     Name: Rekha Herndon    Age: 73 y.o. Date of Admission: 1/10/2022  6:12 PM    Date of Service: 1/16/2022    Reason for Consultation: Tachycardia    Chief Complaint   Patient presents with    Fatigue     patient attempted to go to pcp today and didnt have the strength. came here instead. patient states sepsis prior to thanksgiving requiring admission and rehab. Referring Physician: Selma Sinclair MD    Subjective: Underwent abdominal surgery yesterday and now sleeping comfortably. I spoke with patient's son and reported mom has been sleeping comfortably without complaints of pains.         Past Medical History:  Past Medical History:   Diagnosis Date    Abnormal EKG     left bundle branch block    Acute gout involving toe of right foot 9/3/2020    Acute gout involving toe of right foot 9/3/2020    Acute on chronic combined systolic (congestive) and diastolic (congestive) heart failure (HCC)     Cancer (HCC)     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    Cerebrovascular disease     Clotting disorder ([address] 1985    thrombophlebitis after c section delivery    Depression     15 years followed by dr Scarlett Aceves Hyperlipidemia     Hypertension     Hyperthyroidism     Leg swelling 9/3/2020    Lymphedema of both lower extremities 9/3/2020    Peripheral vascular disease ([address]     Renal failure 11/2012    renal transplant    Thrombophlebitis     after c section delivery    Thyroid disease        Past Surgical History:  Past Surgical History:   Procedure Laterality Date   [facility]    one normal delivery    COLONOSCOPY      DIAGNOSTIC CARDIAC CATH LAB PROCEDURE  2006    normal coronaries and 1996 normal coronaries    DIALYSIS FISTULA CREATION  march and july 2012    phase one and two patient will start dialysis when needed   [address]    transfemoral venous bypass grafts    KIDNEY TRANSPLANT  2016    KIDNEY TRANSPLANT  2015    KIDNEY TRANSPLANT  2015    PARATHYROIDECTOMY  2006    left parathyroid  implant and parathyroidectomy       Family History:  Family History   Problem Relation Age of Onset    Diabetes Mother     Diabetes Father     Dementia Father        Social History:  Social History     Socioeconomic History    Marital status:      Spouse name: Not on file    Number of children: Not on file    Years of education: Not on file    Highest education level: Not on file   Occupational History    Not on file   Tobacco Use    Smoking status: Former Smoker     Packs/day: 1.00     Years: 20.00     Pack years: 20.00     Quit date: 10/1/2015     Years since quittin.2    Smokeless tobacco: Never Used   Vaping Use    Vaping Use: Never used   Substance and Sexual Activity    Alcohol use: No     Comment: 2 cups coffee per day    Drug use: No    Sexual activity: Not on file   Other Topics Concern    Not on file   Social History Narrative    Not on file     Social Determinants of Health     Financial Resource Strain:     Difficulty of Paying Living Expenses: Not on file   Food Insecurity:     Worried About [address] [facility] in the Last Year: Not on file    Vannessa of Food in the Last Year: Not on file   Transportation Needs:     Lack of Transportation (Medical): Not on file    Lack of Transportation (Non-Medical):  Not on file   Physical Activity:     Days of Exercise per Week: Not on file    Minutes of Exercise per Session: Not on file   Stress:     Feeling of Stress : Not on file   Social Connections:     Frequency of Communication with Friends and Family: Not on file    Frequency of Social Gatherings with Friends and Family: Not on file    Attends Restorationist Services: Not on file    Active Member of Clubs or Organizations: Not on file    Attends Club or Organization Meetings: Not on file    Marital Status: Not on file   Intimate Partner Violence:     Fear of Current or Ex-Partner: Not on file    Emotionally Abused: Not on file    Physically Abused: Not on file    Sexually Abused: Not on file   Housing Stability:     Unable to Pay for Housing in the Last Year: Not on file    Number of Places Lived in the Last Year: Not on file    Unstable Housing in the Last Year: Not on file       Allergies: Allergies   Allergen Reactions    Macrodantin [Nitrofurantoin Macrocrystal]     Sulfa Antibiotics        Home Medications:  Prior to Admission medications    Medication Sig Start Date End Date Taking? Authorizing Provider   furosemide (LASIX) 20 MG tablet  12/22/21   Historical Provider, MD   lisinopril (PRINIVIL;ZESTRIL) 5 MG tablet take 1 tablet by mouth once daily 12/8/21   Historical Provider, MD   pregabalin (LYRICA) 75 MG capsule  12/26/21   Historical Provider, MD   nystatin (MYCOSTATIN) 423880 UNIT/GM powder Apply 3 times daily.  12/27/21   KATINA Card - CNP   ipratropium-albuterol (DUONEB) 0.5-2.5 (3) MG/3ML SOLN nebulizer solution Inhale 3 mLs into the lungs every 6 hours as needed for Shortness of Breath 11/27/21   Susannah Ramirez MD   budesonide (PULMICORT) 0.5 MG/2ML nebulizer suspension Take 2 mLs by nebulization 2 times daily for 10 days 11/27/21 12/7/21  Roda Meckel, MD   ondansetron (ZOFRAN-ODT) 4 MG disintegrating tablet Take 1 tablet by mouth every 8 hours as needed for Nausea or Vomiting 11/27/21   Roda Meckel, MD   allopurinol (ZYLOPRIM) 100 MG tablet Take 100 mg by mouth daily    Historical Provider, MD   Cyanocobalamin (B-12 COMPLIANCE INJECTION) 1000 MCG/ML KIT Inject as directed monthly    Historical Provider, MD   metoprolol succinate (TOPROL XL) 50 MG extended release tablet take 1 tablet by mouth once daily 4/17/20   Zelda Meckel, MD   desvenlafaxine succinate (PRISTIQ) 50 MG TB24 extended release tablet Take 1 tablet by mouth every evening 12/20/19   Roda Meckel, MD   cloZAPine (CLOZARIL) 50 MG tablet Take 50 mg by mouth nightly Historical Provider, MD   cycloSPORINE (SANDIMMUNE) 100 MG capsule Take 100 mg by mouth 2 times daily    Historical Provider, MD   predniSONE (DELTASONE) 5 MG tablet Take 5 mg by mouth daily    Historical Provider, MD   folic acid (FOLVITE) 1 MG tablet Take 1 mg by mouth daily    Historical Provider, MD   docusate sodium (COLACE) 100 MG capsule Take 100 mg by mouth 2 times daily    Historical Provider, MD   mycophenolate (CELLCEPT) 500 MG tablet Take 1,000 mg by mouth 2 times daily    Historical Provider, MD   simvastatin (ZOCOR) 20 MG tablet Take 20 mg by mouth nightly. Historical Provider, MD   levothyroxine (SYNTHROID) 50 MCG tablet Take 50 mcg by mouth daily.       Historical Provider, MD       Current Medications:  Current Facility-Administered Medications   Medication Dose Route Frequency Provider Last Rate Last Admin    metoprolol tartrate (LOPRESSOR) tablet 50 mg  50 mg Oral BID Susannah Ramirez MD        docusate (COLACE) 50 MG/5ML liquid 100 mg  100 mg Oral BID Erica Levi MD        mycophenolate (CELLCEPT) 200 MG/ML suspension 500 mg  500 mg Oral BID Brennan Reveles MD        metronidazole (FLAGYL) 500 mg in NaCl 100 mL IVPB premix  500 mg IntraVENous Q8H Abbi Henderson MD   Stopped at 01/16/22 0720    linezolid (ZYVOX) IVPB 600 mg  600 mg IntraVENous Q12H Abbi Henderson MD   Stopped at 01/16/22 0830    heparin (porcine) injection 5,000 Units  5,000 Units SubCUTAneous BID Eriac Levi MD   5,000 Units at 01/16/22 1135    ondansetron (ZOFRAN-ODT) disintegrating tablet 4 mg  4 mg Oral Q8H PRN Erica Levi MD   4 mg at 01/11/22 1806    Or    ondansetron (ZOFRAN) injection 4 mg  4 mg IntraVENous Q6H PRN Susannah Ramirez MD        polyethylene glycol (GLYCOLAX) packet 17 g  17 g Oral Daily PRN Erica Levi MD   17 g at 01/13/22 1611    acetaminophen (TYLENOL) tablet 650 mg  650 mg Oral Q6H PRN Erica Levi MD   650 mg at 01/11/22 1819    Or    acetaminophen (TYLENOL) suppository 650 mg  650 mg Rectal Q6H PRN Bautista Issa MD        predniSONE (DELTASONE) tablet 5 mg  5 mg Oral Daily Waldemar Saini MD   5 mg at 01/13/22 4341    sodium chloride flush 0.9 % injection 5-40 mL  5-40 mL IntraVENous PRN Claudette Ramirez MD        0.9 % sodium chloride infusion  25 mL IntraVENous PRN Bautista Issa  mL/hr at 01/16/22 0035 25 mL at 01/16/22 0035    heparin flush 100 UNIT/ML injection 100 Units  1 mL IntraVENous 2 times per day Bautista Issa MD   100 Units at 01/15/22 2218    heparin flush 100 UNIT/ML injection 100 Units  1 mL IntraCATHeter PRN Bautista Issa MD        cefepime (MAXIPIME) 2000 mg IVPB extended (mini-bag)  2,000 mg IntraVENous Q24H Miguel Mcginnis MD   Stopped at 01/16/22 0215    allopurinol (ZYLOPRIM) tablet 100 mg  100 mg Oral Daily Bautista Issa MD   100 mg at 01/13/22 7590    budesonide (PULMICORT) nebulizer suspension 500 mcg  500 mcg Nebulization BID Bautista Issa MD   500 mcg at 01/16/22 6353    desvenlafaxine succinate (PRISTIQ) extended release tablet 50 mg  50 mg Oral QPM Claudette Ramirez MD   50 mg at 05/24/28 3622    folic acid (FOLVITE) tablet 1 mg  1 mg Oral Daily Claudette Ramirez MD   1 mg at 01/13/22 0906    ipratropium-albuterol (DUONEB) nebulizer solution 3 mL  3 mL Inhalation Q6H PRN Bautista Issa MD   3 mL at 01/16/22 4375    levothyroxine (SYNTHROID) tablet 50 mcg  50 mcg Oral Daily Bautista Issa MD   50 mcg at 01/13/22 8513    atorvastatin (LIPITOR) tablet 10 mg  10 mg Oral Daily Claudette Ramirez MD   10 mg at 01/13/22 0630    ondansetron (ZOFRAN-ODT) disintegrating tablet 4 mg  4 mg Oral Once Jo Ann Foster MD          sodium chloride 25 mL (01/16/22 0035)       Physical Exam:  /61   Pulse 103   Temp 98 °F (36.7 °C) (Infrared)   Resp 19   Ht 5' 2\" (1.575 m)   Wt 218 lb 12.8 oz (99.2 kg)   SpO2 96%   BMI 40.02 kg/m²   Wt Readings from Last 3 Encounters:   01/11/22 218 lb 12.8 oz (99.2 kg)   11/17/21 227 lb 3.2 oz (103.1 kg)   08/16/21 229 lb (103.9 kg)       Patient in OR    Intake/Output:    Intake/Output Summary (Last 24 hours) at 1/16/2022 1301  Last data filed at 1/16/2022 0930  Gross per 24 hour   Intake 801 ml   Output 555 ml   Net 246 ml     No intake/output data recorded. Laboratory Tests:  Recent Labs     01/14/22  0928 01/15/22  0828    143   K 4.8 4.5   * 111*   CO2 21* 20*   BUN 28* 36*   CREATININE 1.4* 1.5*   GLUCOSE 114* 129*   CALCIUM 10.5* 10.4*     Lab Results   Component Value Date    MG 1.8 01/15/2022    MG 1.7 01/14/2022    MG 1.7 01/13/2022     Recent Labs     01/14/22  0928 01/15/22  0828   ALKPHOS 106* 106*   ALT 17 16   AST 15 18   PROT 5.8* 5.7*   BILITOT 0.7 0.7   LABALBU 2.6* 2.6*     Recent Labs     01/13/22  1529 01/14/22 0928 01/15/22  0828   WBC 17.9* 17.0* 20.2*   RBC 3.34* 3.51 3.63   HGB 10.5* 10.9* 11.3*   HCT 36.0 37.7 37.9   .8* 107.4* 104.4*   MCH 31.4 31.1 31.1   MCHC 29.2* 28.9* 29.8*   RDW 15.4* 15.5* 15.4*    179 192   MPV 13.6* 12.9* 13.0*     No results found for: CKTOTAL, CKMB, CKMBINDEX, TROPONINI  No results for input(s): CKTOTAL, CKMB, CKMBINDEX, TROPHS in the last 72 hours. Lab Results   Component Value Date    INR 1.0 11/20/2021    INR 1.1 11/17/2021    PROTIME 11.5 11/20/2021    PROTIME 12.9 (H) 11/17/2021     Lab Results   Component Value Date    TSH 0.376 11/17/2021    TSH 0.807 12/22/2019     Lab Results   Component Value Date    LABA1C 5.7 (H) 11/17/2021     No results found for: EAG  No results found for: CHOL  No results found for: TRIG  No results found for: HDL  No results found for: LDLCALC, LDLCHOLESTEROL  No results found for: LABVLDL, VLDL  No results found for: CHOLHDLRATIO  No results for input(s): PROBNP in the last 72 hours. Cardiac Tests:      Echocardiogram reviewed: December 2019  Technically sub-optimal images.   Normal left ventricular chamber size.   Mild to moderate global hypokinesis, LVEF estimated about 35-38%.   Huma Arias left ventricular concentric hypertrophy noted.   There is doppler evidence of stage I diastolic dysfunction.   Normal left atrial pressure.   Left atrium is of normal size.   Interatrial septum not well visualized but appears intact.   Normal right ventricle structure and function.   No mitral valve prolapse.   Normal aortic root size.   No evidence of pericardial effusion.   Pericardium appears normal.   The inferior vena cava diameter is normal with decreased respiratory   variation.   No intracardiac mass.   Compared to prior study from 2017 - which showed EF 35-38% and mild MR. Stress test reviewed:      Cardiac catheterization reviewed:     CXR reviewed: The ASCVD Risk score (Ethel Javed., et al., 2013) failed to calculate for the following reasons:    Cannot find a previous HDL lab    Cannot find a previous total cholesterol lab    ASSESSMENT / PLAN:    1. Pneumatosis of the colon  Status post abdominal surgery. Management per primary service and surgery    2. CECILY (acute kidney injury) ([facility] [address] status post transplanted kidney  No blood work today. Please monitor BUN/creatinine closely. 3.  Sinus tachycardia  Improved    4. chronic heart failure with systolic function  Continue metoprolol succinate as blood pressure allows and uptitrate for optimal rate control  Initiate Entresto after surgery and when kidney function resolves and blood pressure can tolerate  If kidney function does not allow addition of Entresto then initiate hydralazine and isosorbide mononitrate if blood pressure will allow. 5. Hypertension  6. bipolar disorder  7. COPD  8. Hypothyroidism  9. Depression  10. Obesity  11. peripheral vascular disease status post femoral bypass in the past  12. chronic left bundle branch block  13. HLD, on a statin  14. thrombocytopenia   15. Obesity  16. Depression  17. Hypothyroidism  18. Hx parathyroidectomy  19.        Hx clotting disorder with thrombophlebitis remotely with no Dvt on ultrasound  20. Former smoker           Thank you for allowing me to participate in your patient's care. Please feel free to contact me if you have any questions or concerns.     Godwin Urrutia MD  [facility]) Cardiology

## 2022-01-16 NOTE — ANESTHESIA POSTPROCEDURE EVALUATION
Department of Anesthesiology  Postprocedure Note    Patient: Sheri Moritz  MRN: [id number]  YOB: 1955  Date of evaluation: 1/15/2022  Time:  7:12 PM     Procedure Summary     Date: 01/15/22 Room / Location: [address] [place] [address] / [address] [facility]    Anesthesia Start: 2875 Anesthesia Stop: 4005    Procedure: DIAGNOSTIC  LAPAROSCOPY LYSIS OF ADHESIONS (N/A ) Diagnosis: (POSSIBLE BOWEL OBSTRUCTION)    Surgeons: Paula Feng MD Responsible Provider: Sarah Baires MD    Anesthesia Type: general ASA Status: 4 - Emergent          Anesthesia Type: general    Carla Phase I: Carla Score: 8    Carla Phase II:      Last vitals: Reviewed and per EMR flowsheets.        Anesthesia Post Evaluation    Patient location during evaluation: PACU  Patient participation: complete - patient participated  Level of consciousness: awake  Airway patency: patent  Nausea & Vomiting: no nausea and no vomiting  Complications: no  Cardiovascular status: hemodynamically stable  Respiratory status: acceptable and BIPAP  Hydration status: euvolemic

## 2022-01-16 NOTE — PROGRESS NOTES
OT BEDSIDE TREATMENT NOTE      Date:2022  Patient Name: Selvin Paez  MRN: [id number]  : 1955  Room: [address] [place] [address]        Evaluating OT: Param Cee OTR/L; [id number]        Referring Provider and Orders/Date:  OT eval and treat Start: 22, End: 22, ONE TIME, Standing Count: 1 Occurrences, Erin Marshall MD           Diagnosis:   1.  CECILY (acute kidney injury) [facility])             Pertinent Medical History:        Past Medical History        Past Medical History:   Diagnosis Date    Abnormal EKG       left bundle branch block    Acute gout involving toe of right foot 9/3/2020    Acute gout involving toe of right foot 9/3/2020    Acute on chronic combined systolic (congestive) and diastolic (congestive) heart failure (HCC)      Cancer (HCC)       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    Cerebrovascular disease      Clotting disorder ([address] 1985     thrombophlebitis after c section delivery    Depression       15 years followed by dr Vincent Barnes Hyperlipidemia      Hypertension      Hyperthyroidism      Leg swelling 9/3/2020    Lymphedema of both lower extremities 9/3/2020    Peripheral vascular disease ([address]      Renal failure 2012     renal transplant    Thrombophlebitis       after c section delivery    Thyroid disease               Past Surgical History         Past Surgical History:   Procedure Laterality Date     SECTION   1985     one normal delivery    COLONOSCOPY        DIAGNOSTIC CARDIAC CATH LAB PROCEDURE        normal coronaries and  normal coronaries    DIALYSIS FISTULA CREATION   march and 2012     phase one and two patient will start dialysis when needed   [place]     transfemoral venous bypass grafts    KIDNEY TRANSPLANT   2016    KIDNEY TRANSPLANT   2015    KIDNEY TRANSPLANT   2015    PARATHYROIDECTOMY   2006     left parathyroid  implant and parathyroidectomy           Precautions: Fall Riskmo    Recommended placement: subacute    Assessment of current deficits     [x]? Functional mobility           [x]? ADLs           [x]? Strength                   []?Cognition     [x]? Functional transfers         [x]? IADLs         [x]? Safety Awareness   [x]? Endurance     []? Fine Coordination                        [x]? Balance      []? Vision/perception    []? Sensation       [x]? Gross Motor Coordination            []? ROM           []?  Delirium                   []? Motor Control      OT PLAN OF CARE   OT POC based on physician orders, patient diagnosis and results of clinical assessment     Frequency/Duration 1-3 days/wk for 2 weeks PRN   Specific OT Treatment Interventions to include:   * Instruction/training on adapted ADL techniques and AE recommendations to increase functional independence within precautions       * Training on energy conservation strategies, correct breathing pattern and techniques to improve independence/tolerance for self-care routine  * Functional transfer/mobility training/DME recommendations for increased independence, safety, and fall prevention  * Patient/Family education to increase follow through with safety techniques and functional independence  * Recommendation of environmental modifications for increased safety with functional transfers/mobility and ADLs  * Therapeutic exercise to improve motor endurance, ROM, and functional strength for ADLs/functional transfers  * Therapeutic activities to facilitate/challenge dynamic balance, stand tolerance for increased safety and independence with ADLs  * Therapeutic activities to facilitate gross/fine motor skills for increased independence with ADLs  * Neuro-muscular re-education: facilitation of righting/equilibrium reactions, midline orientation, scapular stability/mobility, normalization of muscle tone, and facilitation of volitional active controled movement  * Positioning to improve skin integrity, interaction with environment and functional independence     Recommended Adaptive Equipment/DME:  TBD       Home Living: Pt lives alone in a apartment 9th, elevator access  with No steps  to enter. Laundry on the 2nd floor with elevator access. Pt cares for mother as well that lives in her own home, but there is a lift to enter. Reports that she has family and friends to assist if needed.     Bathroom setup: tub shower with grab bars, shower chair, standard toilet    DME owned: rollator      Prior Level of Function: indep with ADLs , indep with IADLs; ambulated indep    Driving: yes   Occupation: retired   Enjoys: [employer], casino with mother     Pain Level: none     Cognition: A&O: 4/4; Follows 3 step directions with overall fatigue/lethargy              Memory:  fair              Sequencing:  Fair-              Problem solving:  Fair-              Judgement/safety:  fair-     AM-[facility] Daily Activity Inpatient   How much help for putting on and taking off regular lower body clothing?: Total  How much help for Bathing?: A Lot  How much help for Toileting?: Total  How much help for putting on and taking off regular upper body clothing?: Total  How much help for taking care of personal grooming?: A Lot  How much help for eating meals?: A Little  AM-[facility] Inpatient Daily Activity Raw Score: 10  AM-[facility] Inpatient ADL T-Scale Score : 27.31  ADL Inpatient CMS 0-100% Score: 74.7  ADL Inpatient CMS G-Code Modifier : CL                   Functional Assessment:      Initial Eval Status  Date: 1/11/2022   Treatment Status  Date:1/16/22 STGs = LTGs  Time frame: 10-14 days   Feeding Minimal Assist with weakness and overall fatigue. Hand over hand to prevent spillage. N/T  Indep   Grooming Moderate Assist with pt falling a sleep from sitting up right in bed and reporting poor motivation for hair care, face wash and declining oral care. N/T Supervision    UB Dressing Dependent from sitting EOB with LOB when removing arms from support.   N/T  Minimal Assist    LB Dressing Dependent for dinah socks from supine N/T Moderate Assist    Bathing Maximal Assist from supine level with pt able to complete chest and R UE.  N/T Minimal Assist    Toileting Dependent with hassan management Dep with hassan management Moderate Assist    Bed Mobility  Supine to sit: Maximal Assist   Sit to supine: Moderate Assist  With fatigue and difficultly to remain awake. Mod A to maintain sitting EOB, falling to the right side requesting to lay on pillow. Once in supine, dep to boost in bed with taps system with pt falling a sleep quickly. Rolling at min A for comfort to side lying Max A to bring UB to midline position d/t R lateral lean  Supine to sit: Minimal Assist   Sit to supine: Minimal Assist    Functional Transfers Maximal Assist for sit to stand from elevated surface of bed. Poor motivation from pt and she was unable to maintain for extended period of time. Unsafe to attempt stand pivot this AM  N/T d/t lethargy; not safe to transfer at this time Minimal Assist    Functional Mobility  NT due to pt overall debility, decreased activity tolerance, balance deficits, safety and fall risk.     N/T due to pt overall debility, decreased activity tolerance, balance deficits, safety and fall risk. Minimal Assist    Balance Sitting:     Static:  Poor+    Dynamic:Poor  Standing: poor Sitting:     Static:  Poor+ (assist to bring UB to midline position d/t R lateral lean in bed with HOB elevated)    Dynamic:N/T  Standing:N/T Sitting:     Static:  fair    Dynamic:fair  Standing: fair-   Activity Tolerance Vitals with activity: room air with 94% HR 85  Sitting EOB for 5min average with fall risk and fatigue.  Standing tolerance for <20sec with weakness Poor; pt very lethargic during session, only demo'ing facial grimmacing intermittently with ROM ex's  Increase standingtolerance for >2min with stable vital signs for carry over into toileting, functional tranfers and indep in ADLs Visual/  Perceptual Glasses: reading-Present; WFL     Reports change in vision since admission: No      NA   Yair UE Strengthening  3-/5 generally   4+/5MMT generally for carry over into self care, functional transfers and functional mobility with AD.       Hand Dominance  [x]? Right   []? Left     AROM (PROM) Strength Additional Info:    RUE  PROM WFL   Fair  and fair- FMC/dexterity noted during ADL tasks  Opposition []? Intact [x]? Impaired  Finger to nose []? Intact [x]? Impaired      LUE PROM WFL  Fair  and fair- FMC/dexterity noted during ADL tasks  Opposition []? Intact [x]? Impaired  Finger to nose []? Intact [x]? Impaired    - BUE PROM exercises: 20 reps in all planes of movement to increase ROM/endurance/strength required for functional transfers/ADL participation, as well as for edema management d/t significant edema in B UE's. Exercises completed in shoulder and elbow flexion/extension, internal/external rotation, abduction/adduction, supination/pronation, digit and wrist flexion/extension, .   B UE  PROM appears to be [facility] with assist at this time. Ex's completed in bed with HOB elevated. B UE edema massage distally to proximally, as well as positioning provided in attempts to decrease edema for ADL participation. Hearing: WFL   Sensation:  No c/o numbness or tingling   Tone: WFL   Edema: yair LE      Comments: Patient cleared by nursing staff. Upon arrival pt seated in bed with HOB elevated to ~45*. Pt lethargic during session, with intermittent facial grimmacing noted. Pt educated with regards to positioning, B UE ROM ex's, edema management/massage, ECT's. At end of session pt seated in bed with HOB elevated to ~45*  with all lines and tubes intact, call light within reach. Overall, pt demonstrated decreased independence and safety during completion of ADL/functional transfers/mobility tasks.  Pt would benefit from continued skilled OT to increase safety and independence with completion of ADL/IADL tasks for functional independence and quality of life. Pt required cues and education as noted above for safe facilitation and completion of tasks. Therapist provided skilled monitoring of patient's response during treatment session. Prior to and at the end of session, environmental modifications / line management completed for patients safety and efficiency of treatment session. Overall, patient demonstrates moderate/maximal difficulties with completion of BADLs and IADLs. Factors contributing to these difficulties include lethargy, edema,, decreased endurance, and generalized weakness. As noted above, patient likely to benefit from further OT intervention to increase independence, safety, and overall quality of life. Treatment:     ? Skilled positioning: Proper positioning to improve interaction with environment, overall functioning and decrease/prevent edema and contractures. ? Therapeutic Ex's: To increase B UE strength, ROM, endurance and edema management required for functional transfers and ADL participation. · Pt has made poor progress towards set goals   · OT 1-3x/week for 5-7 days during hospitalization       Treatment Time also includes thorough review of current medical information, gathering information on past medical history/social history and prior level of function, informal observation of tasks, assessment of data and education on plan of care and goals.     Treatment Time In: 1:10 PM     Treatment Time Out: 1:34 PM            Treatment Charges: Mins Units   ADL/Home Mgt     [id number]     Thera Activities     [id number]   Ther Ex                 [id number] 1   Manual Therapy    [id number]     Neuro Re-ed         [id number]     Orthotic manage/training                               [id number]     Non Billable Time     Total Timed Treatment 24 610 [facility], YANCEY/ #[id number]

## 2022-01-16 NOTE — PROGRESS NOTES
Attempted to call son Felicita Thomas for MRI questions to be answered, due to pt lethargy. No answer left message to return call for questions for testing.

## 2022-01-16 NOTE — PLAN OF CARE
Problem: Falls - Risk of:  Goal: Will remain free from falls  Description: Will remain free from falls  1/16/2022 1707 by Teo Ziegler RN  Outcome: Met This Shift     Problem: Falls - Risk of:  Goal: Absence of physical injury  Description: Absence of physical injury  1/16/2022 1707 by Teo Ziegler RN  Outcome: Met This Shift

## 2022-01-16 NOTE — PROGRESS NOTES
[facility] MD Joni King MD. Bailey Robbins MD   Progress Note    1/16/2022    SUBJECTIVE:   1/13:Seen in her room , lying flat on o2/nc , euvolemic , confused   D/w RN on floor   1/14: Patient was seen, interviewed and examined in her room, overall feeling much better with less confusion today. Discussed the case in detail with the nurse, patient had history of kidney transplant in 2015, has maintained of 2 medications including cyclosporine and MMF. We will check with lab to obtain values both medications random throughout toxicity levels that could be responsible for her confusion at one point or another. 1/15: Patient was seen in her room. Case discussed with her son was informing me that, she was scheduled for expiratory laparotomy for probable obstruction of her bowels as per general surgery. We will follow clinical status closely with surgery or any other intervention needed from nephrology. 1/16: Patient underwent yesterday laparoscopic lysis of adhesions with no other pathology being found as per general surgery, will keep following her renal status closely with BUN today is 36 and creatinine down to 1.5 overall stable  PROBLEM LIST:    Active Problems:    CECILY (acute kidney injury) ([address]    Ischemic bowel disease ([address]  Resolved Problems:    * No resolved hospital problems. *       DIET:    ADULT DIET;  Full Liquid       Allergies : Macrodantin [nitrofurantoin macrocrystal] and Sulfa antibiotics    Past Medical History:   Diagnosis Date    Abnormal EKG     left bundle branch block    Acute gout involving toe of right foot 9/3/2020    Acute gout involving toe of right foot 9/3/2020    Acute on chronic combined systolic (congestive) and diastolic (congestive) heart failure (HCC)     Cancer (HCC)     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    Cerebrovascular disease     Clotting disorder ([address] 1985    thrombophlebitis after c section delivery    Depression     15 years followed by dr Cipriano Schroeder Hyperlipidemia     Hypertension     Hyperthyroidism     Leg swelling 9/3/2020    Lymphedema of both lower extremities 9/3/2020    Peripheral vascular disease ([address]     Renal failure 11/2012    renal transplant    Thrombophlebitis     after c section delivery    Thyroid disease        Past Surgical History:   Procedure Laterality Date   [address]    one normal delivery    COLONOSCOPY      DIAGNOSTIC CARDIAC CATH LAB PROCEDURE  2006    normal coronaries and 1996 normal coronaries    DIALYSIS FISTULA CREATION  march and july 2012    phase one and two patient will start dialysis when needed   [address]    transfemoral venous bypass grafts    KIDNEY TRANSPLANT  2016   [facility]  2015   [facility]  2015    PARATHYROIDECTOMY  2006    left parathyroid  implant and parathyroidectomy       Family History   Problem Relation Age of Onset    Diabetes Mother     Diabetes Father     Dementia Father         reports that she quit smoking about 6 years ago. She has a 20.00 pack-year smoking history. She has never used smokeless tobacco. She reports that she does not drink alcohol and does not use drugs. Review of Systems:   Constitutional: no fevers , no chills , feels ok   Eyes: no eye pain , no itching , no drainage  Ears, nose, mouth, throat, and face: no ear ,nose pain , hearing is ok ,no nasal drainage   Respiratory: no sob ,no cough ,no wheezing . Cardiovascular: no chest pain , no palpitation ,no sob . Gastrointestinal: no nausea, vomiting , constipation , no abdominal pain . Genitourinary:no urinary retention , no burning , dysuria . No polyuria   Hematologic/lymphatic: no bleeding , no cougulation issues . Musculoskeletal:no joint pain , no swelling . Neurological: no headaches ,no weakness , no numbness . Endocrine: no thirst , no weight issues .      MEDS (scheduled):    metoprolol tartrate  50 mg Oral BID    docusate  100 mg Oral BID    mycophenolate  500 mg Oral BID    metroNIDAZOLE  500 mg IntraVENous Q8H    linezolid  600 mg IntraVENous Q12H    heparin (porcine)  5,000 Units SubCUTAneous BID    predniSONE  5 mg Oral Daily    heparin flush  1 mL IntraVENous 2 times per day    cefepime  2,000 mg IntraVENous Q24H    allopurinol  100 mg Oral Daily    budesonide  500 mcg Nebulization BID    desvenlafaxine succinate  50 mg Oral QPM    folic acid  1 mg Oral Daily    levothyroxine  50 mcg Oral Daily    atorvastatin  10 mg Oral Daily    ondansetron  4 mg Oral Once       MEDS (infusions):   sodium chloride 25 mL (01/16/22 0035)       MEDS (prn):  ondansetron **OR** ondansetron, polyethylene glycol, acetaminophen **OR** acetaminophen, sodium chloride flush, sodium chloride, heparin flush, ipratropium-albuterol    PHYSICAL EXAM:     Patient Vitals for the past 24 hrs:   BP Temp Temp src Pulse Resp SpO2   01/16/22 1200 -- -- -- -- -- 96 %   01/16/22 1045 -- -- -- -- -- 95 %   01/16/22 1000 -- -- -- -- -- 94 %   01/16/22 0935 -- -- -- -- -- 98 %   01/16/22 0734 132/61 98 °F (36.7 °C) Infrared 103 19 97 %   01/15/22 2345 132/60 98.8 °F (37.1 °C) Axillary 109 20 95 %   01/15/22 1945 -- -- -- 106 -- --   01/15/22 1834 -- -- -- -- 24 --   01/15/22 1743 (!) 115/56 98.9 °F (37.2 °C) Infrared 107 20 93 %   01/15/22 1711 -- -- -- -- 26 --   01/15/22 1649 -- -- -- 111 23 92 %   01/15/22 1648 -- -- -- 109 22 93 %   01/15/22 1647 -- -- -- 110 22 92 %   01/15/22 1646 -- -- -- 110 21 92 %   01/15/22 1645 -- -- -- 110 23 93 %   01/15/22 1644 (!) 158/73 -- -- 110 22 91 %   01/15/22 1643 -- -- -- 111 22 92 %   01/15/22 1642 -- -- -- 109 22 92 %   01/15/22 1641 -- -- -- 109 23 93 %   01/15/22 1640 -- -- -- 109 21 92 %   01/15/22 1639 -- -- -- 109 22 92 %   01/15/22 1638 -- -- -- 108 21 92 %   01/15/22 1637 -- -- -- 110 22 92 %   01/15/22 1636 -- -- -- 111 23 92 %   01/15/22 1635 (!) 140/75 -- -- 110 24 92 %   01/15/22 1634 -- -- -- 112 22 92 %   01/15/22 1633 -- -- -- 112 22 92 %   01/15/22 1632 -- -- -- 112 23 93 %   01/15/22 1631 -- -- -- 113 23 92 %   01/15/22 1630 -- -- -- 113 22 92 %   01/15/22 1629 -- -- -- 111 22 92 %   01/15/22 1628 -- -- -- 111 22 92 %   01/15/22 1627 -- -- -- 109 19 92 %   01/15/22 1626 -- -- -- 111 22 91 %   01/15/22 1625 (!) 143/74 -- -- 111 23 93 %   01/15/22 1624 -- -- -- 109 21 91 %   01/15/22 1623 -- -- -- 111 21 92 %   01/15/22 1622 -- -- -- 110 22 92 %   01/15/22 1621 -- -- -- 112 22 91 %   01/15/22 1620 -- -- -- 111 22 92 %   01/15/22 1619 -- -- -- 111 23 92 %   01/15/22 1618 -- -- -- 110 23 93 %   01/15/22 1617 -- -- -- 111 21 91 %   01/15/22 1616 -- -- -- 111 22 92 %   01/15/22 1615 (!) 142/77 -- -- 109 20 91 %   01/15/22 1614 -- -- -- 108 22 92 %   01/15/22 1613 -- -- -- 109 21 91 %   01/15/22 1612 -- -- -- 110 21 91 %   01/15/22 1611 -- -- -- 110 22 92 %   01/15/22 1610 -- -- -- 110 23 93 %   01/15/22 1609 -- -- -- 110 20 91 %   01/15/22 1608 -- -- -- 110 21 91 %   01/15/22 1607 -- -- -- 109 21 91 %   01/15/22 1606 -- -- -- 111 21 91 %   01/15/22 1605 (!) 163/76 -- -- 112 22 91 %   01/15/22 1604 -- -- -- 112 24 92 %   01/15/22 1603 -- -- -- 112 23 91 %   01/15/22 1602 -- -- -- 111 22 91 %   01/15/22 1601 -- -- -- 111 24 92 %   01/15/22 1600 -- -- -- 109 22 92 %   01/15/22 1559 -- -- -- 112 22 91 %   01/15/22 1558 -- -- -- 111 20 91 %   01/15/22 1557 -- -- -- 111 22 92 %   01/15/22 1556 -- -- -- 112 24 91 %   01/15/22 1555 (!) 148/72 -- -- 110 26 92 %   01/15/22 1554 -- -- -- 111 20 91 %   01/15/22 1553 -- -- -- 110 21 90 %   01/15/22 1552 -- -- -- 111 20 90 %   01/15/22 1551 -- -- -- 110 19 90 %   01/15/22 1550 -- -- -- 112 21 91 %   01/15/22 1549 -- -- -- 112 21 90 %   01/15/22 1548 -- -- -- 112 23 91 %   01/15/22 1547 -- -- -- 112 21 92 %   01/15/22 1546 -- -- -- 113 21 91 %   01/15/22 1545 (!) 165/74 -- -- 112 21 91 %   01/15/22 1544 -- -- -- 110 21 91 %   01/15/22 1543 -- -- -- 110 20 91 %   01/15/22 1542 -- -- -- 111 22 91 %   01/15/22 1541 -- -- -- 111 21 91 %   01/15/22 1540 -- -- -- 110 21 91 %   01/15/22 1539 -- -- -- 110 21 91 %   01/15/22 1538 -- -- -- 110 22 92 %   01/15/22 1537 -- -- -- 111 21 91 %   01/15/22 1536 -- -- -- 112 19 92 %   01/15/22 1535 (!) 165/74 -- -- 112 20 92 %   01/15/22 1534 -- -- -- 111 21 92 %   01/15/22 1533 -- -- -- 111 21 92 %   01/15/22 1532 -- -- -- 113 27 93 %   01/15/22 1531 -- -- -- 112 21 92 %   01/15/22 1530 -- -- -- 111 20 92 %   01/15/22 1529 -- -- -- 112 20 92 %   01/15/22 1528 -- -- -- 112 20 92 %   01/15/22 1527 -- -- -- 113 20 92 %   01/15/22 1526 -- -- -- 113 21 92 %   01/15/22 1525 -- -- -- 113 23 93 %   01/15/22 1524 (!) 162/70 -- -- 112 18 92 %   01/15/22 1523 -- -- -- 113 21 92 %   01/15/22 1522 -- -- -- 113 20 92 %   01/15/22 1521 -- -- -- 117 24 92 %   01/15/22 1520 -- -- -- 110 26 92 %   01/15/22 1519 -- -- -- 111 18 92 %   01/15/22 1518 -- -- -- 110 21 92 %   01/15/22 1517 -- -- -- 112 21 92 %   01/15/22 1516 -- -- -- 115 21 92 %   01/15/22 1515 -- -- -- 118 21 93 %   01/15/22 1514 (!) 152/73 -- -- 108 21 93 %   01/15/22 1513 -- -- -- 108 20 92 %   01/15/22 1512 -- -- -- 109 19 92 %   01/15/22 1511 (!) 162/70 -- -- 113 21 93 %   01/15/22 1510 -- -- -- 109 21 93 %   01/15/22 1509 -- -- -- 109 21 93 %   01/15/22 1508 -- -- -- 110 18 93 %   01/15/22 1507 -- -- -- 115 20 93 %   01/15/22 1506 129/84 -- -- 117 19 93 %   01/15/22 1505 -- -- -- 106 23 91 %   01/15/22 1504 -- -- -- 107 18 93 %   01/15/22 1503 -- -- -- 106 18 93 %   01/15/22 1502 -- -- -- 106 17 94 %   01/15/22 1501 -- -- -- 107 17 95 %   01/15/22 1500 -- -- -- 106 17 93 %   01/15/22 1459 -- -- -- 106 18 93 %   01/15/22 1458 -- -- -- 106 16 93 %   01/15/22 1457 -- -- -- 108 17 94 %   01/15/22 1456 -- -- -- 105 18 94 %   01/15/22 1455 (!) 149/61 -- -- 104 16 93 %   01/15/22 1454 -- -- -- 105 16 93 %   01/15/22 1453 -- -- -- 104 16 94 %   01/15/22 1452 -- -- -- 104 16 94 %   01/15/22 1451 -- -- -- 104 17 94 %   01/15/22 1450 -- -- -- 105 16 94 %   01/15/22 1449 -- -- -- 105 17 94 %   01/15/22 1448 -- -- -- 105 16 94 %   01/15/22 1447 -- -- -- 106 15 94 %   01/15/22 1446 -- -- -- 105 16 94 %   01/15/22 1445 (!) 153/54 -- -- 106 17 93 %   01/15/22 1444 -- -- -- 106 16 94 %   01/15/22 1443 -- -- -- 106 16 94 %   01/15/22 1442 -- -- -- 106 17 94 %   01/15/22 1441 (!) 153/54 98.4 °F (36.9 °C) Temporal 105 16 94 %   01/15/22 1440 -- -- -- 106 16 94 %   01/15/22 1439 -- -- -- 107 16 94 %   01/15/22 1438 -- -- -- 106 16 94 %   01/15/22 1437 -- -- -- 106 16 94 %   01/15/22 1436 -- -- -- 106 15 94 %   01/15/22 1435 (!) 137/55 -- -- 106 16 93 %   01/15/22 1434 -- -- -- 106 15 94 %   01/15/22 1433 -- -- -- 107 16 95 %   01/15/22 1432 -- -- -- 106 17 94 %   01/15/22 1431 -- -- -- 106 17 95 %   01/15/22 1430 -- -- -- 107 16 97 %   01/15/22 1429 -- -- -- 107 14 96 %   01/15/22 1428 -- -- -- 105 17 93 %   01/15/22 1427 -- -- -- 107 17 94 %   01/15/22 1426 -- -- -- 108 16 94 %   01/15/22 1425 (!) 122/54 -- -- 108 16 94 %   01/15/22 1424 -- -- -- 109 16 93 %   01/15/22 1423 -- -- -- 109 19 92 %   01/15/22 1422 -- -- -- 112 18 96 %   01/15/22 1421 -- -- -- 115 13 98 %   01/15/22 1420 -- -- -- 115 15 98 %   01/15/22 1419 -- -- -- 116 15 98 %   01/15/22 1418 -- -- -- 116 15 98 %   01/15/22 1417 -- -- -- 116 17 98 %   01/15/22 1416 -- -- -- 116 15 98 %   01/15/22 1415 (!) 115/53 -- -- 116 15 98 %   01/15/22 1414 -- -- -- 116 18 98 %   01/15/22 1413 -- -- -- -- -- 98 %   01/15/22 1412 -- -- -- -- -- 97 %   01/15/22 1411 115/83 98.3 °F (36.8 °C) Temporal 117 14 98 %   @      Intake/Output Summary (Last 24 hours) at 1/16/2022 1306  Last data filed at 1/16/2022 0930  Gross per 24 hour   Intake 801 ml   Output 555 ml   Net 246 ml         Wt Readings from Last 3 Encounters:   01/11/22 218 lb 12.8 oz (99.2 kg)   11/17/21 227 lb 3.2 oz (103.1 kg)   08/16/21 229 lb (103.9 kg)       Constitutional:  in no acute distress  Oral: mucus membranes moist  Neck: no JVD  Cardiovascular: S1, S2 regular rhythm, no murmur,or rub  Respiratory:  No crackles, no wheeze  Gastrointestinal:  Soft, nontender, nondistended, NABS  Ext: no edema, feet warm  Skin: dry, no rash  Neuro: awake, alert, interactive      DATA:    Recent Labs     01/13/22  1529 01/14/22  0928 01/15/22  0828   WBC 17.9* 17.0* 20.2*   HGB 10.5* 10.9* 11.3*   HCT 36.0 37.7 37.9   .8* 107.4* 104.4*    179 192     Recent Labs     01/14/22  0928 01/15/22  0828    143   K 4.8 4.5   * 111*   CO2 21* 20*   MG 1.7 1.8   PHOS 3.2 2.3*   BUN 28* 36*   CREATININE 1.4* 1.5*   ALT 17 16   AST 15 18   BILITOT 0.7 0.7   ALKPHOS 106* 106*       No results found for: LABPROT    ASSESSMENT / RECOMMENDATIONS:      1. Acute kidney injury, felt to be related to volume depletion and  consists of diarrhea and poor p.o. intake. CECILY resolving with BUN today of 28 and creatinine down to 1.4.  2.  Chronic kidney disease, stage III. The patient with kidney  transplant. Baseline creatinine 1.5.  3. Immunosuppressant host, chronically on cyclosporin 100 mg b.i.d.,  CellCept 1000 mg b.i.d., and prednisone 5 mg. 4.  Sepsis. Need to rule out a C. diff. infection as the patient is  reporting diarrhea. 5.  Obesity. 6.  Hypotension.     PLAN:    Cr improving   Confused and ammonia high . Started on lactulose  Improving confusion, scheduled for CAT scan of the abdomen this afternoon    1. Continue current IV fluid in the form of normal saline at 60 mL an  hour. 2.  continue cyclosporin 100 mg b.i.d.  3.  continue prednisone 5 mg daily. cut CellCept to 500 mg bid  due to concern of sepsis . 5.  Hold lisinopril for now. 6.  We will follow along with you. Will be rounding on the patient on daily basis and reviewing labs and all the consultants input.     Electronically signed by Nishi Bradshaw MD on 1/16/2022 at 1:06 PM

## 2022-01-16 NOTE — PROGRESS NOTES
Per charge RN, all pt meds were gone over with pharmacist & changes were made to the a.m medications that can not be crushed & put down NG.

## 2022-01-16 NOTE — PROGRESS NOTES
Progress Note  Date:2022       Room:0515/0515-02  Patient Cholo Askew     YOB: 1955     Age:66 y.o. Subjective    Subjective:  Symptoms:  Worsening. (Pt very lethargic  , barely opens her eyes , or responds). Diet:  NPO (unbale to eat due to being groggy ). Activity level: Impaired due to weakness. Pain:  She reports no pain. Review of Systems  Objective         Vitals Last 24 Hours:  TEMPERATURE:  Temp  Av.6 °F (37 °C)  Min: 98 °F (36.7 °C)  Max: 98.9 °F (37.2 °C)  RESPIRATIONS RANGE: Resp  Av.5  Min: 18  Max: 27  PULSE OXIMETRY RANGE: SpO2  Av %  Min: 90 %  Max: 98 %  PULSE RANGE: Pulse  Av.9  Min: 103  Max: 118  BLOOD PRESSURE RANGE: Systolic (23QGN), JTK:795 , Min:115 , WYI:285   ; Diastolic (64LBV), VU, Min:56, Max:84    I/O (24Hr): Intake/Output Summary (Last 24 hours) at 2022 1505  Last data filed at 2022 1425  Gross per 24 hour   Intake 101 ml   Output 550 ml   Net -449 ml     Objective:  General Appearance: In no acute distress (drowzy and very lethargic  ,on bipap). Vital signs: (most recent): Blood pressure 132/61, pulse 103, temperature 98 °F (36.7 °C), temperature source Infrared, resp. rate 19, height 5' 2\" (1.575 m), weight 218 lb 12.8 oz (99.2 kg), SpO2 96 %. Vital signs are normal.    Output: Producing urine. HEENT: Normal HEENT exam.    Lungs: There are decreased breath sounds. Heart: Normal rate. Regular rhythm. Positive for murmur. Abdomen: Abdomen is soft and distended. (Obese ). Bowel sounds are normal.   There is left upper quadrant tenderness. (Incisions from laparoscopy , glued and clean ). Extremities: (Edema +1 bilateral both LL and UL)  Neurological: (Very drowzy ). Pupils:  Pupils are equal, round, and reactive to light.       Labs/Imaging/Diagnostics    Labs:  CBC:  Recent Labs     22  1529 22  0928 01/15/22  0828   WBC 17.9* 17.0* 20.2*   RBC 3.34* 3.51 3.63   HGB 10.5* 10.9* 11.3*   HCT 36.0 37.7 37.9   .8* 107.4* 104.4*   RDW 15.4* 15.5* 15.4*    179 192     CHEMISTRIES:  Recent Labs     01/14/22  0928 01/15/22  0828    143   K 4.8 4.5   * 111*   CO2 21* 20*   BUN 28* 36*   CREATININE 1.4* 1.5*   GLUCOSE 114* 129*   PHOS 3.2 2.3*   MG 1.7 1.8     PT/INR:No results for input(s): PROTIME, INR in the last 72 hours. APTT:No results for input(s): APTT in the last 72 hours. LIVER PROFILE:  Recent Labs     01/14/22  0928 01/15/22  0828   AST 15 18   ALT 17 16   BILITOT 0.7 0.7   ALKPHOS 106* 106*       Imaging Last 24 Hours:  CT ABDOMEN PELVIS WO CONTRAST Additional Contrast? None    Result Date: 1/10/2022  EXAMINATION: CT OF THE ABDOMEN AND PELVIS WITHOUT CONTRAST 1/10/2022 6:56 pm TECHNIQUE: CT of the abdomen and pelvis was performed without the administration of intravenous contrast. Multiplanar reformatted images are provided for review. Dose modulation, iterative reconstruction, and/or weight based adjustment of the mA/kV was utilized to reduce the radiation dose to as low as reasonably achievable. COMPARISON: CT abdomen pelvis 11/17/2021 HISTORY: ORDERING SYSTEM PROVIDED HISTORY: evaluate for diarrhea TECHNOLOGIST PROVIDED HISTORY: Reason for exam:->evaluate for diarrhea Additional Contrast?->None Decision Support Exception - unselect if not a suspected or confirmed emergency medical condition->Emergency Medical Condition (MA) FINDINGS: Lower Chest: Atherosclerotic disease. Atrophic kidneys with multiple cysts. Some of the presumed cysts appear hyperdense. Subcentimeter foci in the kidneys are too small to accurately characterize. There is a transplant kidney in the right lower quadrant. There is no hydronephrosis of the transplant kidney. Decreased perinephric stranding above the transplant kidney. Unremarkable bladder. Unremarkable appearance of the reproductive organs. No abdominal aortic aneurysm. Atherosclerotic disease.  No evidence of cholecystitis or pancreatitis. No free air or significant free fluid. No evidence of bowel obstruction. Nondilated appendix. No significant fluid within the large bowel. Small fat containing umbilical hernia. Mild degenerative changes of the spine. Presumed surgical scars in the anterior abdominal wall. No significant fluid within the large bowel. No hydronephrosis of the transplant kidneys. The native kidneys are atrophic and have multiple intermediate density cysts which should be further evaluated with nonemergent ultrasound. Atherosclerotic disease. XR CHEST PORTABLE    Result Date: 1/11/2022  EXAMINATION: ONE XRAY VIEW OF THE CHEST 1/11/2022 9:11 am COMPARISON: None. HISTORY: ORDERING SYSTEM PROVIDED HISTORY: shortness of breath TECHNOLOGIST PROVIDED HISTORY: Reason for exam:->shortness of breath FINDINGS: The heart is enlarged. There are no findings of failure. The lungs are clear. There is no focal infiltrate or effusion. 1. Stable cardiomegaly. There is no evidence of failure or pneumonia. Assessment//Plan           Hospital Problems           Last Modified POA    CECILY (acute kidney injury) ([facility] 1/11/2022 Yes    Ischemic bowel disease ([facility] 1/15/2022 Yes        Assessment:    Condition: In stable condition. Improving. (Sepsis immunocompromised host ,   Still on Cefepime and vanc  X 1 dose afebrile now on zyvox , and flagyl   Urine enterococcus fecalis on zyvox ,   Blood culture negative  , urine negative no infection ,  resp film array negative ,  ? Source , but has been afebrile , but still  Very lethargic ,   Ct scan of the abdomen ? Enteritis , colonic distention r/o ischemic bowel ,pneumatosis , was taken by dr Ney Merida yesterday fr possible ischemic bowel  Laparoscopy , but was clear     Somnolence , and delirium ?  Sepsis  Possible pneumonia also on bipap , on cefepime     Leukocytosis secondary to above ,      Acute on chronic renal failure , renal function better with hydration , d/c IV fluids creatinine upon admission was 2.5 , better , labs today still was not resulted  , and baseline 1.2   S/p renal transplant in the past ,     HTN bp optimal  On metorpolol    Copd on aerosol treatment as needed     Bipolar controlled on  pristiq , hold clozaril    Hypothyroidism continue synthroid     NPO due to  Being very droay , place NG tube for medications , and possibly start TF  ,         ).        Electronically signed by Miriam Aguilera MD on 1/12/22 at 6:29 PM EST

## 2022-01-16 NOTE — PLAN OF CARE
Problem: Skin Integrity:  Goal: Will show no infection signs and symptoms  Description: Will show no infection signs and symptoms  1/16/2022 0311 by Remy Martinez RN  Outcome: Met This Shift  1/16/2022 0311 by Remy Martinez RN  Outcome: Met This Shift  Goal: Absence of new skin breakdown  Description: Absence of new skin breakdown  1/16/2022 0311 by Remy Martinez RN  Outcome: Met This Shift  1/16/2022 0311 by Remy Martinez RN  Outcome: Met This Shift     Problem: Falls - Risk of:  Goal: Will remain free from falls  Description: Will remain free from falls  1/16/2022 0311 by Remy Martinez RN  Outcome: Met This Shift  1/16/2022 0311 by Remy Martinez RN  Outcome: Met This Shift  1/15/2022 1815 by Jess Henderson RN  Outcome: Met This Shift  Goal: Absence of physical injury  Description: Absence of physical injury  1/16/2022 0311 by Remy Martinez RN  Outcome: Met This Shift  1/16/2022 0311 by Remy Martinez RN  Outcome: Met This Shift  1/15/2022 1815 by Jess Henderson RN  Outcome: Met This Shift

## 2022-01-16 NOTE — OP NOTE
[address]                                OPERATIVE REPORT    PATIENT NAME: Shayy Healy              :        1955  MED REC NO:   [id number]                            ROOM:       0515  ACCOUNT NO:   [de-identified]                           ADMIT DATE: 01/10/2022  PROVIDER:     Jessica Carlton MD    DATE OF PROCEDURE:  01/15/2022    PREOPERATIVE DIAGNOSIS:  Possible bowel ischemia. POSTOPERATIVE DIAGNOSES:  No signs of ischemia. Intra-abdominal  adhesions. Minimal intra-abdominal fluid. No other signs of  intra-abdominal pathology. PROCEDURE:  Diagnostic laparoscopy with lysis of adhesions. SURGEON:  Jessica Carlton MD    ASSISTANT:  None. ANESTHESIA:  General.    COMPLICATIONS:  None. BLOOD LOSS:  Minimal.    DISPOSITION:  To be admitted to floor for routine postoperative care. SPECIMEN:  None. INDICATIONS:  This is a 71-year-old female who had CAT scan and exam  findings concerning for a bowel ischemia. I explained the risks,  benefits, potential outcomes, alternative treatment to the  aforementioned procedure, and she agreed to proceed understanding those  risks and potential outcomes. OPERATIVE PROCEDURE:  The patient was brought to the operative suite,  placed under general anesthesia, had bilateral PCDs placed, was already  on antibiotics, was then prepped and draped in normal sterile condition. Once this done, local anesthetic was infiltrated supraumbilically. A 5  mm incision was made. A Veress needle was passed in the peritoneum. CO2 was used to insufflate the abdomen to a pressure of 50 mmHg. At  this time, the Veress needle was removed and a 5 mm trocar was put in  its place. Two additional 5 mm trocars were placed in the midline. We  were able to see into the abdomen and see no appreciable bowel ischemia.   There were some adhesions in the abdomen that made it difficult to see  from previous surgeries the entire bowel. These were lysed with a sharp  dissection and were taken down to completely view the small intestine,  large intestine, and there appeared to be no abnormalities. The large  intestine was dilated, but there appeared no signs of bowel ischemia and  it had good healthy color. The entire small bowel and large bowel were  reviewed. Remainder of the peritoneum, liver, and stomach were viewed  without any other abnormalities appreciated. At this time,  pneumoperitoneum and all trocars were removed, and _____ were closed  with 4-0 Monocryl and surgical glue.   The patient was then woken up and  taken to Alexander Beth MD    D: 01/15/2022 13:49:42       T: 01/15/2022 13:52:06     [email or web address]  Job#: [id number]     Doc#: [id number]    CC:

## 2022-01-17 ENCOUNTER — APPOINTMENT (OUTPATIENT)
Dept: GENERAL RADIOLOGY | Age: 67
DRG: 673 | End: 2022-01-17
Payer: MEDICARE

## 2022-01-17 ENCOUNTER — APPOINTMENT (OUTPATIENT)
Dept: CT IMAGING | Age: 67
DRG: 673 | End: 2022-01-17
Payer: MEDICARE

## 2022-01-17 LAB
(1,3)-BETA-D-GLUCAN (FUNGITELL) INTERPRETATION: POSITIVE
(1,3)-BETA-D-GLUCAN (FUNGITELL): 104 PG/ML
AADO2: 131.7 MMHG
AADO2: 228.2 MMHG
AADO2: 239.7 MMHG
ALBUMIN SERPL-MCNC: 2.3 G/DL (ref 3.5–5.2)
ALBUMIN SERPL-MCNC: 2.3 G/DL (ref 3.5–5.2)
ALP BLD-CCNC: 74 U/L (ref 35–104)
ALP BLD-CCNC: 76 U/L (ref 35–104)
ALT SERPL-CCNC: 12 U/L (ref 0–32)
ALT SERPL-CCNC: 13 U/L (ref 0–32)
AMMONIA: 43 UMOL/L (ref 11–51)
ANION GAP SERPL CALCULATED.3IONS-SCNC: 7 MMOL/L (ref 7–16)
ANISOCYTOSIS: ABNORMAL
AST SERPL-CCNC: 12 U/L (ref 0–31)
AST SERPL-CCNC: 13 U/L (ref 0–31)
B.E.: -2.7 MMOL/L (ref -3–3)
B.E.: -3.3 MMOL/L (ref -3–3)
B.E.: -3.5 MMOL/L (ref -3–3)
B.E.: -6.3 MMOL/L (ref -3–3)
BASOPHILS ABSOLUTE: 0 E9/L (ref 0–0.2)
BASOPHILS RELATIVE PERCENT: 0 % (ref 0–2)
BILIRUB SERPL-MCNC: 0.4 MG/DL (ref 0–1.2)
BILIRUB SERPL-MCNC: 0.4 MG/DL (ref 0–1.2)
BILIRUBIN DIRECT: 0.3 MG/DL (ref 0–0.3)
BILIRUBIN, INDIRECT: 0.1 MG/DL (ref 0–1)
BUN BLDV-MCNC: 72 MG/DL (ref 6–23)
CALCIUM SERPL-MCNC: 10.1 MG/DL (ref 8.6–10.2)
CHLORIDE BLD-SCNC: 113 MMOL/L (ref 98–107)
CHLORIDE URINE RANDOM: 69 MMOL/L
CO2: 23 MMOL/L (ref 22–29)
COHB: 0.3 % (ref 0–1.5)
COMMENT: ABNORMAL
CREAT SERPL-MCNC: 2.2 MG/DL (ref 0.5–1)
CREATININE URINE: 43 MG/DL (ref 29–226)
CRITICAL: ABNORMAL
CULTURE, BLOOD 2: NORMAL
DATE ANALYZED: ABNORMAL
DATE OF COLLECTION: ABNORMAL
DELIVERY SYSTEMS: ABNORMAL
DEVICE: ABNORMAL
EOSINOPHILS ABSOLUTE: 0 E9/L (ref 0.05–0.5)
EOSINOPHILS RELATIVE PERCENT: 0 % (ref 0–6)
FIO2 ARTERIAL: 5
FIO2: 40 %
FIO2: 60 %
FIO2: 60 %
GFR AFRICAN AMERICAN: 27
GFR NON-AFRICAN AMERICAN: 22 ML/MIN/1.73
GLUCOSE BLD-MCNC: 119 MG/DL (ref 74–99)
HCO3 ARTERIAL: 25.6 MMOL/L (ref 22–26)
HCO3: 21.6 MMOL/L (ref 22–26)
HCO3: 24.5 MMOL/L (ref 22–26)
HCO3: 25.2 MMOL/L (ref 22–26)
HCT VFR BLD CALC: 37.1 % (ref 34–48)
HEMOGLOBIN: 10.5 G/DL (ref 11.5–15.5)
HHB: 2.1 % (ref 0–5)
HHB: 2.4 % (ref 0–5)
HHB: 5.6 % (ref 0–5)
LAB: ABNORMAL
LACTIC ACID: 0.6 MMOL/L (ref 0.5–2.2)
LYMPHOCYTES ABSOLUTE: 0.42 E9/L (ref 1.5–4)
LYMPHOCYTES RELATIVE PERCENT: 2 % (ref 20–42)
Lab: ABNORMAL
MAGNESIUM: 2.3 MG/DL (ref 1.6–2.6)
MCH RBC QN AUTO: 30.9 PG (ref 26–35)
MCHC RBC AUTO-ENTMCNC: 28.3 % (ref 32–34.5)
MCV RBC AUTO: 109.1 FL (ref 80–99.9)
METAMYELOCYTES RELATIVE PERCENT: 1 % (ref 0–1)
METHB: 0.3 % (ref 0–1.5)
METHB: 0.3 % (ref 0–1.5)
METHB: 0.4 % (ref 0–1.5)
MODE: ABNORMAL
MONOCYTES ABSOLUTE: 0.85 E9/L (ref 0.1–0.95)
MONOCYTES RELATIVE PERCENT: 4 % (ref 2–12)
NEUTROPHILS ABSOLUTE: 19.93 E9/L (ref 1.8–7.3)
NEUTROPHILS RELATIVE PERCENT: 93 % (ref 43–80)
O2 CONTENT: 15.6 ML/DL
O2 CONTENT: 16.1 ML/DL
O2 CONTENT: 16.2 ML/DL
O2 SATURATION: 93.4 % (ref 92–98.5)
O2 SATURATION: 94.4 % (ref 92–98.5)
O2 SATURATION: 97.6 % (ref 92–98.5)
O2 SATURATION: 97.9 % (ref 92–98.5)
O2HB: 93.8 % (ref 94–97)
O2HB: 96.9 % (ref 94–97)
O2HB: 97.3 % (ref 94–97)
OPERATOR ID: 2321
OPERATOR ID: 2323
OPERATOR ID: 2323
OPERATOR ID: 3300
PATIENT TEMP: 37 C
PCO2 ARTERIAL: 64 MMHG (ref 35–45)
PCO2: 53.9 MMHG (ref 35–45)
PCO2: 57.7 MMHG (ref 35–45)
PCO2: 58.9 MMHG (ref 35–45)
PDW BLD-RTO: 15.9 FL (ref 11.5–15)
PEEP/CPAP: 5 CMH2O
PFO2: 1.9 MMHG/%
PFO2: 1.9 MMHG/%
PFO2: 2.02 MMHG/%
PH BLOOD GAS: 7.21 (ref 7.35–7.45)
PH BLOOD GAS: 7.22 (ref 7.35–7.45)
PH BLOOD GAS: 7.25 (ref 7.35–7.45)
PH BLOOD GAS: 7.25 (ref 7.35–7.45)
PHOSPHORUS: 4.4 MG/DL (ref 2.5–4.5)
PLATELET # BLD: 174 E9/L (ref 130–450)
PMV BLD AUTO: 12.8 FL (ref 7–12)
PO2 ARTERIAL: 84.6 MMHG (ref 60–80)
PO2: 113.8 MMHG (ref 75–100)
PO2: 121.1 MMHG (ref 75–100)
PO2: 75.8 MMHG (ref 75–100)
POLYCHROMASIA: ABNORMAL
POTASSIUM SERPL-SCNC: 5.2 MMOL/L (ref 3.5–5)
PS: 10 CMH20
PS: 16 CMH20
PS: 18 CMH20
RBC # BLD: 3.4 E12/L (ref 3.5–5.5)
RI(T): 1.74
RI(T): 1.88
RI(T): 2.11
SODIUM BLD-SCNC: 143 MMOL/L (ref 132–146)
SODIUM URINE: 71 MMOL/L
SOURCE, BLOOD GAS: ABNORMAL
THB: 11.7 G/DL (ref 11.5–16.5)
THB: 11.7 G/DL (ref 11.5–16.5)
THB: 11.8 G/DL (ref 11.5–16.5)
TIME ANALYZED: 1347
TIME ANALYZED: 1955
TIME ANALYZED: 2258
TOTAL PROTEIN: 4.8 G/DL (ref 6.4–8.3)
TOTAL PROTEIN: 4.8 G/DL (ref 6.4–8.3)
WBC # BLD: 21.2 E9/L (ref 4.5–11.5)

## 2022-01-17 PROCEDURE — 2580000003 HC RX 258: Performed by: HOSPITALIST

## 2022-01-17 PROCEDURE — 6360000002 HC RX W HCPCS: Performed by: INTERNAL MEDICINE

## 2022-01-17 PROCEDURE — C9113 INJ PANTOPRAZOLE SODIUM, VIA: HCPCS | Performed by: HOSPITALIST

## 2022-01-17 PROCEDURE — 94640 AIRWAY INHALATION TREATMENT: CPT

## 2022-01-17 PROCEDURE — 6370000000 HC RX 637 (ALT 250 FOR IP): Performed by: INTERNAL MEDICINE

## 2022-01-17 PROCEDURE — 71250 CT THORAX DX C-: CPT

## 2022-01-17 PROCEDURE — 85025 COMPLETE CBC W/AUTO DIFF WBC: CPT

## 2022-01-17 PROCEDURE — 2580000003 HC RX 258: Performed by: INTERNAL MEDICINE

## 2022-01-17 PROCEDURE — 71045 X-RAY EXAM CHEST 1 VIEW: CPT

## 2022-01-17 PROCEDURE — 36415 COLL VENOUS BLD VENIPUNCTURE: CPT

## 2022-01-17 PROCEDURE — 80076 HEPATIC FUNCTION PANEL: CPT

## 2022-01-17 PROCEDURE — 74018 RADEX ABDOMEN 1 VIEW: CPT

## 2022-01-17 PROCEDURE — 84100 ASSAY OF PHOSPHORUS: CPT

## 2022-01-17 PROCEDURE — 83735 ASSAY OF MAGNESIUM: CPT

## 2022-01-17 PROCEDURE — 2500000003 HC RX 250 WO HCPCS: Performed by: SPECIALIST

## 2022-01-17 PROCEDURE — 6360000002 HC RX W HCPCS: Performed by: HOSPITALIST

## 2022-01-17 PROCEDURE — 80053 COMPREHEN METABOLIC PANEL: CPT

## 2022-01-17 PROCEDURE — 82805 BLOOD GASES W/O2 SATURATION: CPT

## 2022-01-17 PROCEDURE — 82803 BLOOD GASES ANY COMBINATION: CPT

## 2022-01-17 PROCEDURE — 82436 ASSAY OF URINE CHLORIDE: CPT

## 2022-01-17 PROCEDURE — 82570 ASSAY OF URINE CREATININE: CPT

## 2022-01-17 PROCEDURE — 84300 ASSAY OF URINE SODIUM: CPT

## 2022-01-17 PROCEDURE — 83605 ASSAY OF LACTIC ACID: CPT

## 2022-01-17 PROCEDURE — 82140 ASSAY OF AMMONIA: CPT

## 2022-01-17 PROCEDURE — 6360000002 HC RX W HCPCS: Performed by: SPECIALIST

## 2022-01-17 PROCEDURE — 2700000000 HC OXYGEN THERAPY PER DAY

## 2022-01-17 PROCEDURE — 99233 SBSQ HOSP IP/OBS HIGH 50: CPT | Performed by: INTERNAL MEDICINE

## 2022-01-17 PROCEDURE — 80158 DRUG ASSAY CYCLOSPORINE: CPT

## 2022-01-17 PROCEDURE — 94660 CPAP INITIATION&MGMT: CPT

## 2022-01-17 PROCEDURE — 99024 POSTOP FOLLOW-UP VISIT: CPT | Performed by: SURGERY

## 2022-01-17 PROCEDURE — 2060000000 HC ICU INTERMEDIATE R&B

## 2022-01-17 PROCEDURE — 2580000003 HC RX 258: Performed by: SPECIALIST

## 2022-01-17 PROCEDURE — 36600 WITHDRAWAL OF ARTERIAL BLOOD: CPT

## 2022-01-17 PROCEDURE — 74176 CT ABD & PELVIS W/O CONTRAST: CPT

## 2022-01-17 PROCEDURE — 87205 SMEAR GRAM STAIN: CPT

## 2022-01-17 RX ORDER — SODIUM CHLORIDE 9 MG/ML
INJECTION, SOLUTION INTRAVENOUS CONTINUOUS
Status: DISCONTINUED | OUTPATIENT
Start: 2022-01-17 | End: 2022-01-19

## 2022-01-17 RX ORDER — SODIUM CHLORIDE 9 MG/ML
INJECTION, SOLUTION INTRAVENOUS CONTINUOUS
Status: DISCONTINUED | OUTPATIENT
Start: 2022-01-17 | End: 2022-01-17

## 2022-01-17 RX ORDER — FLUCONAZOLE 2 MG/ML
400 INJECTION, SOLUTION INTRAVENOUS EVERY 24 HOURS
Status: DISCONTINUED | OUTPATIENT
Start: 2022-01-17 | End: 2022-01-25 | Stop reason: HOSPADM

## 2022-01-17 RX ORDER — FUROSEMIDE 10 MG/ML
20 INJECTION INTRAMUSCULAR; INTRAVENOUS ONCE
Status: COMPLETED | OUTPATIENT
Start: 2022-01-17 | End: 2022-01-17

## 2022-01-17 RX ORDER — PANTOPRAZOLE SODIUM 40 MG/10ML
40 INJECTION, POWDER, LYOPHILIZED, FOR SOLUTION INTRAVENOUS 2 TIMES DAILY
Status: DISCONTINUED | OUTPATIENT
Start: 2022-01-17 | End: 2022-01-25 | Stop reason: HOSPADM

## 2022-01-17 RX ORDER — SODIUM CHLORIDE 9 MG/ML
10 INJECTION INTRAVENOUS 2 TIMES DAILY
Status: DISCONTINUED | OUTPATIENT
Start: 2022-01-17 | End: 2022-01-25 | Stop reason: HOSPADM

## 2022-01-17 RX ADMIN — BUDESONIDE 500 MCG: 0.5 INHALANT RESPIRATORY (INHALATION) at 18:36

## 2022-01-17 RX ADMIN — DOCUSATE SODIUM 100 MG: 50 LIQUID ORAL at 21:43

## 2022-01-17 RX ADMIN — METOPROLOL TARTRATE 50 MG: 50 TABLET, FILM COATED ORAL at 12:21

## 2022-01-17 RX ADMIN — IPRATROPIUM BROMIDE AND ALBUTEROL SULFATE 3 ML: .5; 3 SOLUTION RESPIRATORY (INHALATION) at 18:37

## 2022-01-17 RX ADMIN — MYCOPHENOLATE MOFETIL 500 MG: 200 POWDER, FOR SUSPENSION ORAL at 12:21

## 2022-01-17 RX ADMIN — METRONIDAZOLE 500 MG: 500 INJECTION, SOLUTION INTRAVENOUS at 05:44

## 2022-01-17 RX ADMIN — HEPARIN SODIUM 5000 UNITS: 5000 INJECTION INTRAVENOUS; SUBCUTANEOUS at 12:46

## 2022-01-17 RX ADMIN — FOLIC ACID 1 MG: 1 TABLET ORAL at 12:21

## 2022-01-17 RX ADMIN — SODIUM CHLORIDE, PRESERVATIVE FREE 10 ML: 5 INJECTION INTRAVENOUS at 14:31

## 2022-01-17 RX ADMIN — ATORVASTATIN CALCIUM 10 MG: 10 TABLET, FILM COATED ORAL at 12:21

## 2022-01-17 RX ADMIN — METOPROLOL TARTRATE 50 MG: 50 TABLET, FILM COATED ORAL at 21:43

## 2022-01-17 RX ADMIN — HEPARIN SODIUM 5000 UNITS: 5000 INJECTION INTRAVENOUS; SUBCUTANEOUS at 21:42

## 2022-01-17 RX ADMIN — ALLOPURINOL 100 MG: 100 TABLET ORAL at 12:21

## 2022-01-17 RX ADMIN — HEPARIN 100 UNITS: 100 SYRINGE at 12:22

## 2022-01-17 RX ADMIN — METRONIDAZOLE 500 MG: 500 INJECTION, SOLUTION INTRAVENOUS at 21:45

## 2022-01-17 RX ADMIN — SODIUM CHLORIDE: 9 INJECTION, SOLUTION INTRAVENOUS at 22:12

## 2022-01-17 RX ADMIN — CEFEPIME HYDROCHLORIDE 2000 MG: 2 INJECTION, POWDER, FOR SOLUTION INTRAVENOUS at 18:20

## 2022-01-17 RX ADMIN — SODIUM CHLORIDE, PRESERVATIVE FREE 10 ML: 5 INJECTION INTRAVENOUS at 22:45

## 2022-01-17 RX ADMIN — PANTOPRAZOLE SODIUM 40 MG: 40 INJECTION, POWDER, FOR SOLUTION INTRAVENOUS at 21:43

## 2022-01-17 RX ADMIN — HEPARIN 100 UNITS: 100 SYRINGE at 22:45

## 2022-01-17 RX ADMIN — SODIUM CHLORIDE: 9 INJECTION, SOLUTION INTRAVENOUS at 03:36

## 2022-01-17 RX ADMIN — FLUCONAZOLE 400 MG: 2 INJECTION INTRAVENOUS at 16:01

## 2022-01-17 RX ADMIN — BUDESONIDE 500 MCG: 0.5 INHALANT RESPIRATORY (INHALATION) at 05:16

## 2022-01-17 RX ADMIN — LEVOTHYROXINE SODIUM 50 MCG: 0.05 TABLET ORAL at 12:20

## 2022-01-17 RX ADMIN — METRONIDAZOLE 500 MG: 500 INJECTION, SOLUTION INTRAVENOUS at 14:31

## 2022-01-17 RX ADMIN — PREDNISONE 5 MG: 5 TABLET ORAL at 12:21

## 2022-01-17 RX ADMIN — LINEZOLID 600 MG: 600 INJECTION, SOLUTION INTRAVENOUS at 06:46

## 2022-01-17 RX ADMIN — SODIUM CHLORIDE, PRESERVATIVE FREE 10 ML: 5 INJECTION INTRAVENOUS at 12:47

## 2022-01-17 RX ADMIN — FUROSEMIDE 20 MG: 10 INJECTION, SOLUTION INTRAMUSCULAR; INTRAVENOUS at 14:30

## 2022-01-17 RX ADMIN — PANTOPRAZOLE SODIUM 40 MG: 40 INJECTION, POWDER, FOR SOLUTION INTRAVENOUS at 14:30

## 2022-01-17 RX ADMIN — LINEZOLID 600 MG: 600 INJECTION, SOLUTION INTRAVENOUS at 19:04

## 2022-01-17 RX ADMIN — SODIUM CHLORIDE, PRESERVATIVE FREE 10 ML: 5 INJECTION INTRAVENOUS at 16:01

## 2022-01-17 ASSESSMENT — PAIN SCALES - GENERAL: PAINLEVEL_OUTOF10: 0

## 2022-01-17 NOTE — PROGRESS NOTES
Just to make sure this is clear, I went to OR in fear of ischemic bowel but she had no intraabdominal pathology, so the pneumatosis of colon was not accurate and if it was/is present it is benign in nature which is not uncommon. Her #1 problem therefore is not pneumatosis of colon. She is septic from other causes and I ruled out intraabdominal causes. Her abdominal distention may be ileus secondary to sepsis.     Starla Cullen MD

## 2022-01-17 NOTE — PROGRESS NOTES
Progress Note  Date:2022       Room:0515/0515-02  Patient Trish Ge     YOB: 1955     Age:66 y.o. Subjective    Subjective:  Symptoms:  Stable. (Pt very lethargic  , barely opens her eyes , or responds, in no distress). Diet:  Dietary issues: unable to eat due to being groggy , NG tube in place will try TF. Activity level: Impaired due to weakness. Pain:  She reports no pain. Review of Systems  Objective         Vitals Last 24 Hours:  TEMPERATURE:  Temp  Av.3 °F (36.8 °C)  Min: 97.5 °F (36.4 °C)  Max: 99.5 °F (37.5 °C)  RESPIRATIONS RANGE: Resp  Av.3  Min: 16  Max: 17  PULSE OXIMETRY RANGE: SpO2  Av.2 %  Min: 94 %  Max: 98 %  PULSE RANGE: Pulse  Av.6  Min: 65  Max: 96  BLOOD PRESSURE RANGE: Systolic (68ZFX), SHARITA:731 , Min:109 , HRF:694   ; Diastolic (87FLR), SA, Min:58, Max:76    I/O (24Hr): Intake/Output Summary (Last 24 hours) at 2022 0944  Last data filed at 2022 0542  Gross per 24 hour   Intake 520 ml   Output 600 ml   Net -80 ml     Objective:  General Appearance: In no acute distress (drowzy and very lethargic  ,on bipap). Vital signs: (most recent): Blood pressure (!) 124/59, pulse 74, temperature 98 °F (36.7 °C), temperature source Axillary, resp. rate 16, height 5' 2\" (1.575 m), weight 218 lb 12.8 oz (99.2 kg), SpO2 97 %. Vital signs are normal.    Output: Producing urine. HEENT: Normal HEENT exam.    Lungs: There are decreased breath sounds. Heart: Normal rate. Regular rhythm. Positive for murmur. Abdomen: Abdomen is soft and distended. (Obese ). Hypoactive bowel sounds. There is no abdominal tenderness. (Incisions from laparoscopy , glued and clean ). Extremities: (Edema trace  bilateral both LL and +1 both  UL)  Neurological: (Very drowzy ). Pupils:  Pupils are equal, round, and reactive to light.       Labs/Imaging/Diagnostics    Labs:  CBC:  Recent Labs     01/15/22  0828   WBC 20.2*   RBC 3.63   HGB 11.3*   HCT 37.9   .4*   RDW 15.4*        CHEMISTRIES:  Recent Labs     01/15/22  0828      K 4.5   *   CO2 20*   BUN 36*   CREATININE 1.5*   GLUCOSE 129*   PHOS 2.3*   MG 1.8     PT/INR:No results for input(s): PROTIME, INR in the last 72 hours. APTT:No results for input(s): APTT in the last 72 hours. LIVER PROFILE:  Recent Labs     01/15/22  0828   AST 18   ALT 16   BILITOT 0.7   ALKPHOS 106*       Imaging Last 24 Hours:  CT ABDOMEN PELVIS WO CONTRAST Additional Contrast? None    Result Date: 1/10/2022  EXAMINATION: CT OF THE ABDOMEN AND PELVIS WITHOUT CONTRAST 1/10/2022 6:56 pm TECHNIQUE: CT of the abdomen and pelvis was performed without the administration of intravenous contrast. Multiplanar reformatted images are provided for review. Dose modulation, iterative reconstruction, and/or weight based adjustment of the mA/kV was utilized to reduce the radiation dose to as low as reasonably achievable. COMPARISON: CT abdomen pelvis 11/17/2021 HISTORY: ORDERING SYSTEM PROVIDED HISTORY: evaluate for diarrhea TECHNOLOGIST PROVIDED HISTORY: Reason for exam:->evaluate for diarrhea Additional Contrast?->None Decision Support Exception - unselect if not a suspected or confirmed emergency medical condition->Emergency Medical Condition (MA) FINDINGS: Lower Chest: Atherosclerotic disease. Atrophic kidneys with multiple cysts. Some of the presumed cysts appear hyperdense. Subcentimeter foci in the kidneys are too small to accurately characterize. There is a transplant kidney in the right lower quadrant. There is no hydronephrosis of the transplant kidney. Decreased perinephric stranding above the transplant kidney. Unremarkable bladder. Unremarkable appearance of the reproductive organs. No abdominal aortic aneurysm. Atherosclerotic disease. No evidence of cholecystitis or pancreatitis. No free air or significant free fluid. No evidence of bowel obstruction.  Nondilated appendix. No significant fluid within the large bowel. Small fat containing umbilical hernia. Mild degenerative changes of the spine. Presumed surgical scars in the anterior abdominal wall. No significant fluid within the large bowel. No hydronephrosis of the transplant kidneys. The native kidneys are atrophic and have multiple intermediate density cysts which should be further evaluated with nonemergent ultrasound. Atherosclerotic disease. XR CHEST PORTABLE    Result Date: 1/11/2022  EXAMINATION: ONE XRAY VIEW OF THE CHEST 1/11/2022 9:11 am COMPARISON: None. HISTORY: ORDERING SYSTEM PROVIDED HISTORY: shortness of breath TECHNOLOGIST PROVIDED HISTORY: Reason for exam:->shortness of breath FINDINGS: The heart is enlarged. There are no findings of failure. The lungs are clear. There is no focal infiltrate or effusion. 1. Stable cardiomegaly. There is no evidence of failure or pneumonia. Assessment//Plan           Hospital Problems           Last Modified POA    CECILY (acute kidney injury) ([facility] 1/11/2022 Yes    Ischemic bowel disease ([facility] [address] 1/15/2022 Yes        Assessment:    Condition: In stable condition. Improving. (Sepsis immunocompromised host ,   Still on Cefepime and vanc  X 1 dose afebrile now on zyvox , and flagyl   Urine enterococcus fecalis on zyvox ,   Blood culture negative  , urine negative no infection ,  resp film array negative ,  ? Source , but has been afebrile , but still  Very lethargic ,   Ct scan of the abdomen ? Enteritis , colonic distention r/o ischemic bowel ,pneumatosis , was taken by dr Janene Chand 1/15  possible ischemic bowel  Laparoscopy , but was clear . Consult GI and start tf very slow     Somnolence , and delirium ?  Sepsis  Possible pneumonia also on bipap , on cefepime     Leukocytosis secondary to above ,      Acute on chronic renal failure , renal function better with hydration , d/c IV fluids creatinine upon admission was 2.5 , better , labs today still was not resulted  , and baseline 1.2   S/p renal transplant in the past ,     HTN bp optimal  On metorpolol    Copd on aerosol treatment as needed     Bipolar controlled on  pristiq , hold clozaril    Hypothyroidism continue synthroid     Will start TF very slow , and check ABG again , now on NC not bipap ,   Will consult pulmonology critical care for eval   Await transfer to [facility] , when bed available   Discussed with her son Trey Caraballo on the phone         ).        Electronically signed by Keyon Escobedo MD on 1/12/22 at 6:29 PM EST

## 2022-01-17 NOTE — PROGRESS NOTES
INPATIENT CARDIOLOGY FOLLOW-UP VISIT     Name: Phillip Hernandez    Age: 73 y.o. Date of Admission: 1/10/2022  6:12 PM    Date of Service: 1/17/2022    Reason for Consultation: Tachycardia    Chief Complaint   Patient presents with    Fatigue     patient attempted to go to pcp today and didnt have the strength. came here instead. patient states sepsis prior to thanksgiving requiring admission and rehab. Referring Physician: Charlie Duffy MD    Subjective: Still remains lethargic since surgery requiring more oxygen. Please check ammonia level, CMP, BNP, chest x-ray CBC and magnesium. There is no labs since January 15.     Past Medical History:  Past Medical History:   Diagnosis Date    Abnormal EKG     left bundle branch block    Acute gout involving toe of right foot 9/3/2020    Acute gout involving toe of right foot 9/3/2020    Acute on chronic combined systolic (congestive) and diastolic (congestive) heart failure (HCC)     Cancer (HCC)     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    Cerebrovascular disease     Clotting disorder ([address] 1985    thrombophlebitis after c section delivery    Depression     15 years followed by dr Cipriano Schroeder Hyperlipidemia     Hypertension     Hyperthyroidism     Leg swelling 9/3/2020    Lymphedema of both lower extremities 9/3/2020    Peripheral vascular disease ([address]     Renal failure 11/2012    renal transplant    Thrombophlebitis     after c section delivery    Thyroid disease        Past Surgical History:  Past Surgical History:   Procedure Laterality Date   [address]    one normal delivery    COLECTOMY N/A 1/15/2022    DIAGNOSTIC  LAPAROSCOPY LYSIS OF ADHESIONS performed by Jung Alejandre MD at [address] [address] CATH LAB PROCEDURE  2006    normal coronaries and 1996 normal coronaries    DIALYSIS FISTULA CREATION  march and july 2012    phase one and two patient will start dialysis when needed   [address]    transfemoral venous bypass grafts    KIDNEY TRANSPLANT  2016    KIDNEY TRANSPLANT  2015    KIDNEY TRANSPLANT  2015    PARATHYROIDECTOMY  2006    left parathyroid  implant and parathyroidectomy       Family History:  Family History   Problem Relation Age of Onset    Diabetes Mother     Diabetes Father     Dementia Father        Social History:  Social History     Socioeconomic History    Marital status:      Spouse name: Not on file    Number of children: Not on file    Years of education: Not on file    Highest education level: Not on file   Occupational History    Not on file   Tobacco Use    Smoking status: Former Smoker     Packs/day: 1.00     Years: 20.00     Pack years: 20.00     Quit date: 10/1/2015     Years since quittin.3    Smokeless tobacco: Never Used   Vaping Use    Vaping Use: Never used   Substance and Sexual Activity    Alcohol use: No     Comment: 2 cups coffee per day    Drug use: No    Sexual activity: Not on file   Other Topics Concern    Not on file   Social History Narrative    Not on file     Social Determinants of Health     Financial Resource Strain:     Difficulty of Paying Living Expenses: Not on file   Food Insecurity:     Worried About Running Out of Food in the Last Year: Not on file    Vannessa of Food in the Last Year: Not on file   Transportation Needs:     Lack of Transportation (Medical): Not on file    Lack of Transportation (Non-Medical):  Not on file   Physical Activity:     Days of Exercise per Week: Not on file    Minutes of Exercise per Session: Not on file   Stress:     Feeling of Stress : Not on file   Social Connections:     Frequency of Communication with Friends and Family: Not on file    Frequency of Social Gatherings with Friends and Family: Not on file    Attends Restorationist Services: Not on file    Active Member of Clubs or Organizations: Not on file    Attends Club or Organization Meetings: Not on file    Marital Status: Not on file   Intimate Partner Violence:     Fear of Current or Ex-Partner: Not on file    Emotionally Abused: Not on file    Physically Abused: Not on file    Sexually Abused: Not on file   Housing Stability:     Unable to Pay for Housing in the Last Year: Not on file    Number of [place] in the Last Year: Not on file    Unstable Housing in the Last Year: Not on file       Allergies: Allergies   Allergen Reactions    Macrodantin [Nitrofurantoin Macrocrystal]     Sulfa Antibiotics        Home Medications:  Prior to Admission medications    Medication Sig Start Date End Date Taking? Authorizing Provider   furosemide (LASIX) 20 MG tablet  12/22/21   Historical Provider, MD   lisinopril (PRINIVIL;ZESTRIL) 5 MG tablet take 1 tablet by mouth once daily 12/8/21   Historical Provider, MD   pregabalin (LYRICA) 75 MG capsule  12/26/21   Historical Provider, MD   nystatin (MYCOSTATIN) 246401 UNIT/GM powder Apply 3 times daily.  12/27/21   Glendale Meigs, APRN - RAHAT   ipratropium-albuterol (DUONEB) 0.5-2.5 (3) MG/3ML SOLN nebulizer solution Inhale 3 mLs into the lungs every 6 hours as needed for Shortness of Breath 11/27/21   Susannah Ramirez MD   budesonide (PULMICORT) 0.5 MG/2ML nebulizer suspension Take 2 mLs by nebulization 2 times daily for 10 days 11/27/21 12/7/21  Giovanni Cardoso MD   ondansetron (ZOFRAN-ODT) 4 MG disintegrating tablet Take 1 tablet by mouth every 8 hours as needed for Nausea or Vomiting 11/27/21   Giovanni Cardoso MD   allopurinol (ZYLOPRIM) 100 MG tablet Take 100 mg by mouth daily    Historical Provider, MD   Cyanocobalamin (B-12 COMPLIANCE INJECTION) 1000 MCG/ML KIT Inject as directed monthly    Historical Provider, MD   metoprolol succinate (TOPROL XL) 50 MG extended release tablet take 1 tablet by mouth once daily 4/17/20   Nelly Mathew MD   desvenlafaxine succinate (PRISTIQ) 50 MG TB24 extended release tablet Take 1 tablet by mouth every evening 12/20/19   Paige Farrell MD   cloZAPine (CLOZARIL) 50 MG tablet Take 50 mg by mouth nightly    Historical Provider, MD   cycloSPORINE (SANDIMMUNE) 100 MG capsule Take 100 mg by mouth 2 times daily    Historical Provider, MD   predniSONE (DELTASONE) 5 MG tablet Take 5 mg by mouth daily    Historical Provider, MD   folic acid (FOLVITE) 1 MG tablet Take 1 mg by mouth daily    Historical Provider, MD   docusate sodium (COLACE) 100 MG capsule Take 100 mg by mouth 2 times daily    Historical Provider, MD   mycophenolate (CELLCEPT) 500 MG tablet Take 1,000 mg by mouth 2 times daily    Historical Provider, MD   simvastatin (ZOCOR) 20 MG tablet Take 20 mg by mouth nightly. Historical Provider, MD   levothyroxine (SYNTHROID) 50 MCG tablet Take 50 mcg by mouth daily.       Historical Provider, MD       Current Medications:  Current Facility-Administered Medications   Medication Dose Route Frequency Provider Last Rate Last Admin    pantoprazole (PROTONIX) injection 40 mg  40 mg IntraVENous BID Roscoe Singh MD        And    sodium chloride (PF) 0.9 % injection 10 mL  10 mL IntraVENous BID Roscoe Singh MD   10 mL at 01/17/22 1247    metoprolol tartrate (LOPRESSOR) tablet 50 mg  50 mg Oral BID Trista Rota MD James   50 mg at 01/17/22 1221    docusate (COLACE) 50 MG/5ML liquid 100 mg  100 mg Oral BID Susannah L MD James   100 mg at 01/16/22 1550    mycophenolate (CELLCEPT) 200 MG/ML suspension 500 mg  500 mg Oral BID Denise Travis MD   500 mg at 01/17/22 1221    metronidazole (FLAGYL) 500 mg in NaCl 100 mL IVPB premix  500 mg IntraVENous Q8H Kaley Dang MD   Stopped at 01/17/22 0650    linezolid (ZYVOX) IVPB 600 mg  600 mg IntraVENous Q12H Kaley Dang MD   Stopped at 01/17/22 0828    heparin (porcine) injection 5,000 Units  5,000 Units SubCUTAneous BID Paige Farrell MD   5,000 Units at 01/17/22 1246    ondansetron (ZOFRAN-ODT) disintegrating tablet 4 mg  4 mg Oral Q8H PRN Susannah PORTER MD James   4 mg at 01/11/22 1806    Or    ondansetron (ZOFRAN) injection 4 mg  4 mg IntraVENous Q6H PRN Susannah Ramirez MD        polyethylene glycol (GLYCOLAX) packet 17 g  17 g Oral Daily PRN Charlie Duffy MD   17 g at 01/13/22 1611    acetaminophen (TYLENOL) tablet 650 mg  650 mg Oral Q6H PRN Charlie Duffy MD   650 mg at 01/11/22 1819    Or    acetaminophen (TYLENOL) suppository 650 mg  650 mg Rectal Q6H PRN Valdemar Ramirez MD        predniSONE (DELTASONE) tablet 5 mg  5 mg Oral Daily Reji Ellis MD   5 mg at 01/17/22 1221    sodium chloride flush 0.9 % injection 5-40 mL  5-40 mL IntraVENous PRN Valdemar Ramirez MD   10 mL at 01/16/22 2157    0.9 % sodium chloride infusion  25 mL IntraVENous PRN Valdemar Ramirez  mL/hr at 01/16/22 0035 25 mL at 01/16/22 0035    heparin flush 100 UNIT/ML injection 100 Units  1 mL IntraVENous 2 times per day Charlie Duffy MD   100 Units at 01/17/22 1222    heparin flush 100 UNIT/ML injection 100 Units  1 mL IntraCATHeter PRN Susannah Ramirez MD        cefepime (MAXIPIME) 2000 mg IVPB extended (mini-bag)  2,000 mg IntraVENous Q24H Brandan Lazo MD   Stopped at 01/17/22 0157    allopurinol (ZYLOPRIM) tablet 100 mg  100 mg Oral Daily Susannah Ramirez MD   100 mg at 01/17/22 1221    budesonide (PULMICORT) nebulizer suspension 500 mcg  500 mcg Nebulization BID Valdemar Ramirez MD   500 mcg at 01/17/22 0516    desvenlafaxine succinate (PRISTIQ) extended release tablet 50 mg  50 mg Oral QPM Susannah Ramirez MD   50 mg at 43/59/57 8387    folic acid (FOLVITE) tablet 1 mg  1 mg Oral Daily Susannah Ramirez MD   1 mg at 01/17/22 1221    ipratropium-albuterol (DUONEB) nebulizer solution 3 mL  3 mL Inhalation Q6H PRN Charlie Duffy MD   3 mL at 01/16/22 0324    levothyroxine (SYNTHROID) tablet 50 mcg  50 mcg Oral Daily Valdemar Ramirez MD   50 mcg at 01/17/22 1220    atorvastatin (LIPITOR) tablet 10 mg  10 mg Oral Daily Valdemar Ramirez MD   10 mg at 01/17/22 1221    ondansetron (ZOFRAN-ODT) disintegrating tablet 4 mg  4 mg Oral Once Bennett Delarosa MD          sodium chloride 25 mL (01/16/22 0035)       Physical Exam:  /65   Pulse 75   Temp 98.1 °F (36.7 °C) (Axillary)   Resp 14   Ht 5' 2\" (1.575 m)   Wt 218 lb 12.8 oz (99.2 kg)   SpO2 99%   BMI 40.02 kg/m²   Wt Readings from Last 3 Encounters:   01/11/22 218 lb 12.8 oz (99.2 kg)   11/17/21 227 lb 3.2 oz (103.1 kg)   08/16/21 229 lb (103.9 kg)       Patient in OR    Intake/Output:    Intake/Output Summary (Last 24 hours) at 1/17/2022 1344  Last data filed at 1/17/2022 0542  Gross per 24 hour   Intake 520 ml   Output 600 ml   Net -80 ml     No intake/output data recorded. Laboratory Tests:  Recent Labs     01/15/22  0828      K 4.5   *   CO2 20*   BUN 36*   CREATININE 1.5*   GLUCOSE 129*   CALCIUM 10.4*     Lab Results   Component Value Date    MG 1.8 01/15/2022    MG 1.7 01/14/2022    MG 1.7 01/13/2022     Recent Labs     01/15/22  0828   ALKPHOS 106*   ALT 16   AST 18   PROT 5.7*   BILITOT 0.7   LABALBU 2.6*     Recent Labs     01/15/22  0828   WBC 20.2*   RBC 3.63   HGB 11.3*   HCT 37.9   .4*   MCH 31.1   MCHC 29.8*   RDW 15.4*      MPV 13.0*     No results found for: CKTOTAL, CKMB, CKMBINDEX, TROPONINI  No results for input(s): CKTOTAL, CKMB, CKMBINDEX, TROPHS in the last 72 hours. Lab Results   Component Value Date    INR 1.0 11/20/2021    INR 1.1 11/17/2021    PROTIME 11.5 11/20/2021    PROTIME 12.9 (H) 11/17/2021     Lab Results   Component Value Date    TSH 0.376 11/17/2021    TSH 0.807 12/22/2019     Lab Results   Component Value Date    LABA1C 5.7 (H) 11/17/2021     No results found for: EAG  No results found for: CHOL  No results found for: TRIG  No results found for: HDL  No results found for: LDLCALC, LDLCHOLESTEROL  No results found for: LABVLDL, VLDL  No results found for: CHOLHDLRATIO  No results for input(s): PROBNP in the last 72 hours.     Cardiac Tests:      Echocardiogram reviewed: December 2019  Technically sub-optimal images.   Normal left ventricular chamber size.   Mild to moderate global hypokinesis, LVEF estimated about 35-38%. [place] left ventricular concentric hypertrophy noted.   There is doppler evidence of stage I diastolic dysfunction.   Normal left atrial pressure.   Left atrium is of normal size.   Interatrial septum not well visualized but appears intact.   Normal right ventricle structure and function.   No mitral valve prolapse.   Normal aortic root size.   No evidence of pericardial effusion.   Pericardium appears normal.   The inferior vena cava diameter is normal with decreased respiratory   variation.   No intracardiac mass.   Compared to prior study from 2017 - which showed EF 35-38% and mild MR. Stress test reviewed:      Cardiac catheterization reviewed:     CXR reviewed: The ASCVD Risk score (Meme Curtis., et al., 2013) failed to calculate for the following reasons:    Cannot find a previous HDL lab    Cannot find a previous total cholesterol lab    ASSESSMENT / PLAN:    1. Pneumatosis of the colon  Status post abdominal surgery. Management per primary service and surgery    2. CECILY (acute kidney injury) ([facility] [address] status post transplanted kidney  No blood work since January 15, 2022   Please monitor BUN/creatinine closely. 3.  Sinus tachycardia  Improved  Monitor electrolyte and keep potassium at 4 magnesium at 2    4. chronic heart failure with systolic function  Continue metoprolol succinate as blood pressure allows and uptitrate for optimal rate control  Initiate Entresto after surgery and when kidney function resolves and blood pressure can tolerate  If kidney function does not allow addition of Entresto then initiate hydralazine and isosorbide mononitrate if blood pressure will allow.   Given increased oxygen requirement I have given one-time dose of Lasix 20 mg IV x1  Obtain BNP, CBC, CMP, magnesium and get chest x-ray.    5.  Lethargic  Ammonia level was high couple of days ago  Please get ammonia level and monitor closely and rule out possible alcohol encephalopathy    5. Hypertension  6. bipolar disorder  7. COPD  Optimize management to improve saturation  8. Hypothyroidism  9. Depression  10. Obesity  11. peripheral vascular disease status post femoral bypass in the past  12. chronic left bundle branch block  13. HLD, on a statin  14. thrombocytopenia   15. Obesity  16. Depression  17. Hypothyroidism  18. Hx parathyroidectomy  19. Hx clotting disorder with thrombophlebitis remotely with no Dvt on ultrasound  20. Former smoker           Thank you for allowing me to participate in your patient's care. Please feel free to contact me if you have any questions or concerns.     Washington Rose MD  [facility]) Cardiology

## 2022-01-17 NOTE — PROGRESS NOTES
Speech Language Pathology      NAME:  Idris Ayala  :  1955  DATE: 2022  ROOM:  [address] [place] [address]    Attempted ongoing Speech-Language Pathology intervention for dysphagia. Pt unavailable at this time due to:  [] HOLD per RN/ medical staff d/t medical status   [] Off unit for testing/ procedure    [] With medical staff   [] Declined intervention  [] Sleeping/ Lethargic   [x] Other: hold due to NPO with NG tube to intermittent suction    SLP to continue previously established POC and re-attempt as able.              CECILY (acute kidney injury) ([facility] [address] [N17.9]        Mauro Majano MSCCC/SLP  Speech Language Pathologist  [phone]

## 2022-01-17 NOTE — PROGRESS NOTES
[facility]    [address] [place] [address] 80  [place], [address]  Phone [phone]   [phone]      Admit Date: 1/10/2022  6:12 PM  Pt Name: Chencho Mendoza  MRN: [id number]  : 1955  Reason for Consult:    Chief Complaint   Patient presents with    Fatigue     patient attempted to go to pcp today and didnt have the strength. came here instead. patient states sepsis prior to thanksgiving requiring admission and rehab. Requesting Physician:  Keyon Escobedo MD  PCP: Keyon Escobedo MD  History Obtained From:  patient, chart   ID consulted for CECILY (acute kidney injury) ([address] [N17.9]  on hospital day [facility]       Chief Complaint   Patient presents with    Fatigue     patient attempted to go to pcp today and didnt have the strength. came here instead. patient states sepsis prior to thanksgiving requiring admission and rehab. HISTORYOF PRESENT ILLNESS   Chencho Mendoza is a 73 y.o. female who presents with   has a past medical history of Abnormal EKG, Acute gout involving toe of right foot, Acute gout involving toe of right foot, Acute on chronic combined systolic (congestive) and diastolic (congestive) heart failure ([address] Cancer ([address] Cerebrovascular disease, Clotting disorder ([address] Depression, Hyperlipidemia, Hypertension, Hyperthyroidism, Leg swelling, Lymphedema of both lower extremities, Peripheral vascular disease ([address] Renal failure, Thrombophlebitis, and Thyroid disease. Fatigue  Associated symptoms include fatigue.      PT FOLLOWED UP WITH ID ON  S/P ATBX  STARTED ON FLUCONAZOLE/MYCOSTSATIN POWDER FOR INTERTRIGO   PT COMES IN NOW WITH FATIGUE DEC APPETITE NAUSEA   TMAX99.4 ON RA  CR2.5 WBC17.9  COVID NEG   BLOOD CX PENDING   ATBX was initiated   Not interactive  denies f/c  Just fatigue    Dos  22   BIPAP LETHARGIC DOES RESPONDS HAS NGT  2022   ptis in bed supine 6L afebrile   Barely wakes up Wbc20.2 cr1.5    Urine cx E faecalis   Blood cx ngtd   S/p PROCEDURE:  Diagnostic laparoscopy with lysis of adhesions. No signs of ischemia. Intra-abdominal  adhesions. Minimal intra-abdominal fluid. No other signs of  intra-abdominal pathology.     REVIEW OF SYSTEMS     CONSTITUTIONAL:   as in hpi     CURRENT MEDICATIONS     Current Facility-Administered Medications:     pantoprazole (PROTONIX) injection 40 mg, 40 mg, IntraVENous, BID, 40 mg at 01/17/22 1430 **AND** sodium chloride (PF) 0.9 % injection 10 mL, 10 mL, IntraVENous, BID, Roscoe Singh MD, 10 mL at 01/17/22 1247    metoprolol tartrate (LOPRESSOR) tablet 50 mg, 50 mg, Oral, BID, Susannah Ramirez MD, 50 mg at 01/17/22 1221    docusate (COLACE) 50 MG/5ML liquid 100 mg, 100 mg, Oral, BID, Susannah Ramirez MD, 100 mg at 01/16/22 1550    mycophenolate (CELLCEPT) 200 MG/ML suspension 500 mg, 500 mg, Oral, BID, Denise Travis MD, 500 mg at 01/17/22 1221    metronidazole (FLAGYL) 500 mg in NaCl 100 mL IVPB premix, 500 mg, IntraVENous, Q8H, Kaley Dang MD, Last Rate: 100 mL/hr at 01/17/22 1431, 500 mg at 01/17/22 1431    linezolid (ZYVOX) IVPB 600 mg, 600 mg, IntraVENous, Q12H, Kaley Dang MD, Stopped at 01/17/22 0828    heparin (porcine) injection 5,000 Units, 5,000 Units, SubCUTAneous, BID, Paige Farrell MD, 5,000 Units at 01/17/22 1246    ondansetron (ZOFRAN-ODT) disintegrating tablet 4 mg, 4 mg, Oral, Q8H PRN, 4 mg at 01/11/22 1806 **OR** ondansetron (ZOFRAN) injection 4 mg, 4 mg, IntraVENous, Q6H PRN, Trista Ramirez MD    polyethylene glycol (GLYCOLAX) packet 17 g, 17 g, Oral, Daily PRN, Trista Ramirez MD, 17 g at 01/13/22 1611    acetaminophen (TYLENOL) tablet 650 mg, 650 mg, Oral, Q6H PRN, 650 mg at 01/11/22 1819 **OR** acetaminophen (TYLENOL) suppository 650 mg, 650 mg, Rectal, Q6H PRN, Trista Ramirez MD    predniSONE (DELTASONE) tablet 5 mg, 5 mg, Oral, Daily, Denise Travis MD, 5 mg at 01/17/22 1221    sodium chloride flush 0.9 % injection 5-40 mL, 5-40 mL, IntraVENous, PRN, Susannah Ramirez MD, 10 mL at 01/17/22 1431    0.9 % sodium chloride infusion, 25 mL, IntraVENous, PRN, Chase Jones MD, Last Rate: 100 mL/hr at 01/16/22 0035, 25 mL at 01/16/22 0035    heparin flush 100 UNIT/ML injection 100 Units, 1 mL, IntraVENous, 2 times per day, Chase Jones MD, 100 Units at 01/17/22 1222    heparin flush 100 UNIT/ML injection 100 Units, 1 mL, IntraCATHeter, PRN, Venus Ramirez MD    cefepime (MAXIPIME) 2000 mg IVPB extended (mini-bag), 2,000 mg, IntraVENous, Q24H, Chanel Rodriguez MD, Stopped at 01/17/22 0157    allopurinol (ZYLOPRIM) tablet 100 mg, 100 mg, Oral, Daily, Susannah Ramirez MD, 100 mg at 01/17/22 1221    budesonide (PULMICORT) nebulizer suspension 500 mcg, 500 mcg, Nebulization, BID, Susannah Ramirez MD, 500 mcg at 01/17/22 0516    desvenlafaxine succinate (PRISTIQ) extended release tablet 50 mg, 50 mg, Oral, QPM, Susannah Ramirez MD, 50 mg at 74/05/58 3896    folic acid (FOLVITE) tablet 1 mg, 1 mg, Oral, Daily, Susananh Ramirez MD, 1 mg at 01/17/22 1221    ipratropium-albuterol (DUONEB) nebulizer solution 3 mL, 3 mL, Inhalation, Q6H PRN, Venus Ramirez MD, 3 mL at 01/16/22 0622    levothyroxine (SYNTHROID) tablet 50 mcg, 50 mcg, Oral, Daily, Susannah Ramirez MD, 50 mcg at 01/17/22 1220    atorvastatin (LIPITOR) tablet 10 mg, 10 mg, Oral, Daily, Susannah Ramirez MD, 10 mg at 01/17/22 1221    ondansetron (ZOFRAN-ODT) disintegrating tablet 4 mg, 4 mg, Oral, Once, Jamie Enamorado MD  ALLERGIES     Macrodantin [nitrofurantoin macrocrystal] and Sulfa antibiotics  Immunization History   Administered Date(s) Administered    COVID-19, [place] Peter, PF, 30mcg/0.3mL 01/28/2021, 02/18/2021, 08/18/2021      Internal Administration   First Dose COVID-19, Pfizer, PF, 30mcg/0.3mL  01/28/2021   Second Dose COVID-19, Pfizer PF, 30mcg/0.3mL   02/18/2021       Last COVID Lab SARS-CoV-2 (no units)   Date Value   01/20/2021 Not Detected     SARS-CoV-2 Antibody, Total (no units)   Date Value   01/13/2022 Non-Reactive     SARS-CoV-2, PCR (no units)   Date Value   01/11/2022 Not Detected     SARS-CoV-2, NAAT (no units)   Date Value   01/10/2022 Not Detected            PAST MEDICAL HISTORY     Past Medical History:   Diagnosis Date    Abnormal EKG     left bundle branch block    Acute gout involving toe of right foot 9/3/2020    Acute gout involving toe of right foot 9/3/2020    Acute on chronic combined systolic (congestive) and diastolic (congestive) heart failure (HCC)     Cancer (HCC)     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    Cerebrovascular disease     Clotting disorder ([address] 1985    thrombophlebitis after c section delivery    Depression     15 years followed by dr Ean Nevarez Hyperlipidemia     Hypertension     Hyperthyroidism     Leg swelling 9/3/2020    Lymphedema of both lower extremities 9/3/2020    Peripheral vascular disease [facility]     Renal failure 11/2012    renal transplant    Thrombophlebitis     after c section delivery    Thyroid disease      SURGICAL HISTORY       Past Surgical History:   Procedure Laterality Date   [address]    one normal delivery    COLECTOMY N/A 1/15/2022    DIAGNOSTIC  LAPAROSCOPY LYSIS OF ADHESIONS performed by Saige Lo MD at [address] [place] [address] Drive CATH LAB PROCEDURE  2006    normal coronaries and 1996 normal coronaries    DIALYSIS FISTULA CREATION  march and july 2012    phase one and two patient will start dialysis when needed   [address]    transfemoral venous bypass grafts    KIDNEY TRANSPLANT  2016   [facility]  2015   [facility]  2015    PARATHYROIDECTOMY  2006    left parathyroid  implant and parathyroidectomy     PHYSICAL EXAM       Vitals:    01/17/22 0800 01/17/22 1145 01/17/22 1215 01/17/22 1415   BP: (!) 124/59  131/65    Pulse: 74  75    Resp: 16  14 13   Temp: 98 °F (36.7 °C) 98.1 °F (36.7 °C)    TempSrc: Axillary  Axillary    SpO2: 97% 99% 99% 99%   Weight:       Height:         Physical Exam   Constitutional/General:   IN BED  Head: NC/AT  Neck: Supple, full ROM,    Pulmonary: Lungs  DEc   to auscultation bilaterally ant . Not in respiratory distress on  BIPAP  Cardiovascular:  Regular rate and rhythm   Abdomen: Soft, dec  BS. distension. ngT NT  Extremities:     Warm and well perfused  Pulses:  Distal pulses dec   Skin: Warm and dry   Neurologic:    lethargic   RIGHT MIDLINE  MERCHANT      DIAGNOSTIC RESULTS   RADIOLOGY:   CT ABDOMEN PELVIS WO CONTRAST Additional Contrast? None    Result Date: 1/10/2022  EXAMINATION: CT OF THE ABDOMEN AND PELVIS WITHOUT CONTRAST 1/10/2022 6:56 pm TECHNIQUE: CT of the abdomen and pelvis was performed without the administration of intravenous contrast. Multiplanar reformatted images are provided for review. Dose modulation, iterative reconstruction, and/or weight based adjustment of the mA/kV was utilized to reduce the radiation dose to as low as reasonably achievable. COMPARISON: CT abdomen pelvis 11/17/2021 HISTORY: ORDERING SYSTEM PROVIDED HISTORY: evaluate for diarrhea TECHNOLOGIST PROVIDED HISTORY: Reason for exam:->evaluate for diarrhea Additional Contrast?->None Decision Support Exception - unselect if not a suspected or confirmed emergency medical condition->Emergency Medical Condition (MA) FINDINGS: Lower Chest: Atherosclerotic disease. Atrophic kidneys with multiple cysts. Some of the presumed cysts appear hyperdense. Subcentimeter foci in the kidneys are too small to accurately characterize. There is a transplant kidney in the right lower quadrant. There is no hydronephrosis of the transplant kidney. Decreased perinephric stranding above the transplant kidney. Unremarkable bladder. Unremarkable appearance of the reproductive organs. No abdominal aortic aneurysm. Atherosclerotic disease. No evidence of cholecystitis or pancreatitis.  No for  susceptibility results      ORG Enterococcus faecalis 01/12/2022    ORG Klebsiella pneumoniae ssp pneumoniae 11/17/2021    ORG Staphylococcus coagulase-negative 11/17/2021    ORG Klebsiella pneumoniae ssp pneumoniae 11/17/2021     Lab Results   Component Value Date    BLOODCULT2 24 Hours no growth 01/12/2022    BLOODCULT2  11/17/2021     This organism was isolated in one set. Susceptibility testing is not routinely done as this  organism frequently represents skin contamination. Additional testing can be ordered by calling the  Microbiology Department. ORG Enterococcus faecalis 01/12/2022    ORG Klebsiella pneumoniae ssp pneumoniae 11/17/2021    ORG Staphylococcus coagulase-negative 11/17/2021    ORG Klebsiella pneumoniae ssp pneumoniae 11/17/2021        Urine Culture, Routine   Date Value Ref Range Status   01/12/2022 >100,000 CFU/ml  Final   11/17/2021 <10,000 CFU/mL  Mixed gram positive organisms   (A)  Final   11/17/2021 >100,000 CFU/ml  Final     MRSA Culture Only   Date Value Ref Range Status   11/17/2021 Methicillin resistant Staph aureus not isolated  Final          FINAL IMPRESSION    Patient is a 73 y.o. female who presented with   Chief Complaint   Patient presents with    Fatigue     patient attempted to go to pcp today and didnt have the strength. came here instead. patient states sepsis prior to thanksgiving requiring admission and rehab.    and admitted for CECILY (acute kidney injury) ([address] [N17.9]   IMMUNOCOMPROMISED PT RENAL TRANSPLANT  LEUKOCYTOSIS  pneumatosis involving the ascending to transverse colon. S/p BOWEL RESECTION LAPAROSCOPIC  Lysis of adhesions  Enterococcus faecalis   uti    ?  HCAP   CECILY    FUNGITELL +     S/P RX KLEBSIELLA/CONS BACTEREMIA  S/P RX E COLI UTI            mycophenolate (CELLCEPT) 200 MG/ML suspension 500 mg, BID  metronidazole (FLAGYL) 500 mg in NaCl 100 mL IVPB premix, Q8H  linezolid (ZYVOX) IVPB 600 mg, Q12H     predniSONE (DELTASONE) tablet 5 mg, Daily cefepime (MAXIPIME) 2000 mg IVPB extended (mini-bag), Q24H          ADD ANTFUNGAL     Cont atbx for now plan 8-10 days        Imaging and labs were reviewed per medical records       Thank you for involving me in the care of Koko Valenzuela. Please do not hesitate to call for any questions or concerns.          Electronically signed by Caprice Grey MD on 1/17/2022 at 2:38 PM

## 2022-01-17 NOTE — PLAN OF CARE
Problem: Skin Integrity:  Goal: Will show no infection signs and symptoms  Description: Will show no infection signs and symptoms  Outcome: Met This Shift  Goal: Absence of new skin breakdown  Description: Absence of new skin breakdown  Outcome: Met This Shift     Problem: Falls - Risk of:  Goal: Will remain free from falls  Description: Will remain free from falls  1/17/2022 0026 by Lanette Uriostegui RN  Outcome: Met This Shift  1/16/2022 1707 by Doug Hull RN  Outcome: Met This Shift  1/16/2022 1707 by Doug uHll RN  Outcome: Met This Shift  Goal: Absence of physical injury  Description: Absence of physical injury  1/17/2022 0026 by Lanette Uriostegui RN  Outcome: Met This Shift  1/16/2022 1707 by Doug Hull RN  Outcome: Met This Shift

## 2022-01-17 NOTE — CONSULTS
Hakeem Lo M.D. [facility]  Dr. Noe Mosquera M.D.,  Dr. Cal Thurston M.D.,  Dr. Denys Mueller D.O.,  Dr. Rosenda Walker D.O. ,  Dr. Khadijah Quinn M.D.,  Dr. Kody Fitch D.O. Rohan Yang  73 y.o.  female      Re: Abdominal distention  Requesting physician: Dr Suresh Soni  Date:12:18 PM 1/17/2022          HPI: 73-year-old female patient presenting to the hospital and admitted on the 10th of January where she presented with generalized weakness. Patient has been treated for acute on chronic renal failure, sepsis, pneumonia and respiratory failure requiring continuous noninvasive positive pressure ventilation. Patient has additional past medical history of hypertension, hyperlipidemia, COPD, chronic renal failure, UTI, CVA, depression, hypothyroidism, lymphedema. No information is available in the electronic medical records regarding previous endoscopies. Over the weekend patient underwent surgical intervention for abnormal CT scan revealing bowel pneumatosis. Patient underwent  laparoscopic procedure revealing significant amount of intra-abdominal adhesions with lysis of adhesions. Apparently increased abdominal distention has been noted. Patient herself is lethargic and does not respond to verbal or physical stimuli. She remains on noninvasive positive pressure ventilation (BiPAP). Patient has NG tube. Chest x-ray which I ordered this morning reports no evidence of bowel obstruction with nonspecific bowel gas pattern without evidence of obstruction.       Information sources:   -medical record  -health care team    PMHx:  Past Medical History:   Diagnosis Date    Abnormal EKG     left bundle branch block    Acute gout involving toe of right foot 9/3/2020    Acute gout involving toe of right foot 9/3/2020    Acute on chronic combined systolic (congestive) and diastolic (congestive) heart failure (HCC)     Cancer (HCC)     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    Cerebrovascular disease     Clotting disorder ([facility] 1985    thrombophlebitis after c section delivery    Depression     15 years followed by dr Teresa Strong Hyperlipidemia     Hypertension     Hyperthyroidism     Leg swelling 9/3/2020    Lymphedema of both lower extremities 9/3/2020    Peripheral vascular disease ([facility]     Renal failure 2012    renal transplant    Thrombophlebitis     after c section delivery    Thyroid disease        PSHx:  Past Surgical History:   Procedure Laterality Date     SECTION  1985    one normal delivery    COLECTOMY N/A 1/15/2022    DIAGNOSTIC  LAPAROSCOPY LYSIS OF ADHESIONS performed by Shahid Lowe MD at [address] CATH LAB PROCEDURE      normal coronaries and  normal coronaries    DIALYSIS FISTULA CREATION  march and 2012    phase one and two patient will start dialysis when needed   [address]    transfemoral venous bypass grafts    KIDNEY TRANSPLANT  2016   [facility]     [facility]  2015    PARATHYROIDECTOMY  2006    left parathyroid  implant and parathyroidectomy       Meds:  Current Facility-Administered Medications   Medication Dose Route Frequency Provider Last Rate Last Admin    0.9 % sodium chloride infusion   IntraVENous Continuous Rafa Wilver Renteria MD 50 mL/hr at 22 0336 New Bag at 22 0336    metoprolol tartrate (LOPRESSOR) tablet 50 mg  50 mg Oral BID Susannah Ramirez MD   50 mg at 22 1221    docusate (COLACE) 50 MG/5ML liquid 100 mg  100 mg Oral BID Susannah Ramirez MD   100 mg at 22 1550    mycophenolate (CELLCEPT) 200 MG/ML suspension 500 mg  500 mg Oral BID Samia Mendez MD   500 mg at 22 1221    metronidazole (FLAGYL) 500 mg in NaCl 100 mL IVPB premix  500 mg IntraVENous Etelvina Schaeffer MD   Stopped at 22 0650    linezolid (ZYVOX) IVPB 600 mg  600 mg IntraVENous Q12H Shereen Macias MD Stopped at 01/17/22 0828    heparin (porcine) injection 5,000 Units  5,000 Units SubCUTAneous BID Patricia Guthrie MD   5,000 Units at 01/16/22 2159    ondansetron (ZOFRAN-ODT) disintegrating tablet 4 mg  4 mg Oral Q8H PRN Patricia Guthrie MD   4 mg at 01/11/22 1806    Or    ondansetron (ZOFRAN) injection 4 mg  4 mg IntraVENous Q6H PRN Susannah Ramirez MD        polyethylene glycol (GLYCOLAX) packet 17 g  17 g Oral Daily PRN Patricia Guthrie MD   17 g at 01/13/22 1611    acetaminophen (TYLENOL) tablet 650 mg  650 mg Oral Q6H PRN Patricia Guthrie MD   650 mg at 01/11/22 1819    Or    acetaminophen (TYLENOL) suppository 650 mg  650 mg Rectal Q6H PRN Susannah Ramirez MD        predniSONE (DELTASONE) tablet 5 mg  5 mg Oral Daily Bridgett Hercules MD   5 mg at 01/17/22 1221    sodium chloride flush 0.9 % injection 5-40 mL  5-40 mL IntraVENous PRN Marysol Ramirez MD   10 mL at 01/16/22 2157    0.9 % sodium chloride infusion  25 mL IntraVENous PRN Marysol Ramirez  mL/hr at 01/16/22 0035 25 mL at 01/16/22 0035    heparin flush 100 UNIT/ML injection 100 Units  1 mL IntraVENous 2 times per day Patricia Guthrie MD   100 Units at 01/17/22 1222    heparin flush 100 UNIT/ML injection 100 Units  1 mL IntraCATHeter PRN Susannah Ramirez MD        cefepime (MAXIPIME) 2000 mg IVPB extended (mini-bag)  2,000 mg IntraVENous Q24H Quita Gottron, MD   Stopped at 01/17/22 0157    allopurinol (ZYLOPRIM) tablet 100 mg  100 mg Oral Daily Susannah Ramirez MD   100 mg at 01/17/22 1221    budesonide (PULMICORT) nebulizer suspension 500 mcg  500 mcg Nebulization BID Marysol Ramirez MD   500 mcg at 01/17/22 0516    desvenlafaxine succinate (PRISTIQ) extended release tablet 50 mg  50 mg Oral QPM Susannah Ramirez MD   50 mg at 18/22/87 0904    folic acid (FOLVITE) tablet 1 mg  1 mg Oral Daily Susannah Ramirez MD   1 mg at 01/17/22 1221    ipratropium-albuterol (DUONEB) nebulizer solution 3 mL  3 mL Inhalation Q6H PRN Marysol Ramirez MD   3 mL at 22 0622    levothyroxine (SYNTHROID) tablet 50 mcg  50 mcg Oral Daily Yobani Ramirez MD   50 mcg at 22 1220    atorvastatin (LIPITOR) tablet 10 mg  10 mg Oral Daily Susannah Ramirez MD   10 mg at 22 1221    ondansetron (ZOFRAN-ODT) disintegrating tablet 4 mg  4 mg Oral Once María Billy MD           SocHx:  Social History     Socioeconomic History    Marital status:      Spouse name: Not on file    Number of children: Not on file    Years of education: Not on file    Highest education level: Not on file   Occupational History    Not on file   Tobacco Use    Smoking status: Former Smoker     Packs/day: 1.00     Years: 20.00     Pack years: 20.00     Quit date: 10/1/2015     Years since quittin.3    Smokeless tobacco: Never Used   Vaping Use    Vaping Use: Never used   Substance and Sexual Activity    Alcohol use: No     Comment: 2 cups coffee per day    Drug use: No    Sexual activity: Not on file   Other Topics Concern    Not on file   Social History Narrative    Not on file     Social Determinants of Health     Financial Resource Strain:     Difficulty of Paying Living Expenses: Not on file   Food Insecurity:     Worried About Running Out of Food in the Last Year: Not on file    Vannessa of Food in the Last Year: Not on file   Transportation Needs:     Lack of Transportation (Medical): Not on file    Lack of Transportation (Non-Medical):  Not on file   Physical Activity:     Days of Exercise per Week: Not on file    Minutes of Exercise per Session: Not on file   Stress:     Feeling of Stress : Not on file   Social Connections:     Frequency of Communication with Friends and Family: Not on file    Frequency of Social Gatherings with Friends and Family: Not on file    Attends Druze Services: Not on file    Active Member of Clubs or Organizations: Not on file    Attends Club or Organization Meetings: Not on file    Marital Status: Not on file Intimate Partner Violence:     Fear of Current or Ex-Partner: Not on file    Emotionally Abused: Not on file    Physically Abused: Not on file    Sexually Abused: Not on file   Housing Stability:     Unable to Pay for Housing in the Last Year: Not on file    Number of [place] in the Last Year: Not on file    Unstable Housing in the Last Year: Not on file       FamHx:  Family History   Problem Relation Age of Onset    Diabetes Mother     Diabetes Father     Dementia Father        Allergy:  Allergies   Allergen Reactions    Macrodantin [Nitrofurantoin Macrocrystal]     Sulfa Antibiotics          ROS: As described in the HPI and in addition is negative upon detailed review of systems or unobtainable unless otherwise stated in this dictation. PE:  /65   Pulse 75   Temp 98.3 °F (36.8 °C) (Axillary)   Resp 14   Ht 5' 2\" (1.575 m)   Wt 218 lb 12.8 oz (99.2 kg)   SpO2 99%   BMI 40.02 kg/m²     Gen. : Morbidly obese  female. Lethargic  Head: Atraumatic/normocephalic  Eyes: Anicteric sclera  ENT: NG tube. Moist oral mucosa  Neck: Supple with trachea midline  Chest:NIPPV/symmetrical  Cor: Appears regular with distant heart sounds  Abd.: Obese. Distended. Bowel sounds hypoactive.   Surgical incision consistent with laparoscopic procedure as well as old/healed surgical scars noted  Extr.:  BLE 2+ edema  Muscles: Decreased tone and bulk throughout  Skin: Warm and dry/anicteric        DATA:     Lab Results   Component Value Date    WBC 20.2 01/15/2022    RBC 3.63 01/15/2022    HGB 11.3 01/15/2022    HCT 37.9 01/15/2022    .4 01/15/2022    MCH 31.1 01/15/2022    MCHC 29.8 01/15/2022    RDW 15.4 01/15/2022     01/15/2022    MPV 13.0 01/15/2022     Lab Results   Component Value Date     01/15/2022    K 4.5 01/15/2022    K 5.5 01/10/2022     01/15/2022    CO2 20 01/15/2022    BUN 36 01/15/2022    CREATININE 1.5 01/15/2022    CALCIUM 10.4 01/15/2022    PROT 5.7 01/15/2022    LABALBU 2.6 01/15/2022    BILITOT 0.7 01/15/2022    ALKPHOS 106 01/15/2022    AST 18 01/15/2022    ALT 16 01/15/2022     Lab Results   Component Value Date    LIPASE 19 11/17/2021     No results found for: AMYLASE      ASSESSMENT/PLAN:  1. Abdominal distention/  -Possibly secondary to body habitus/adynamic ileus secondary to acute illness/recent laparoscopic procedure -cannot exclude Tam's syndrome  -General surgery on case  -Hold off tube feedings  -Keep NG tube to low intermittent wall suction  -Monitor physical exam  -Serial radiological follow    2. Anemia  -Chronic/macrocytic  -H&H at baseline without evidence of overt bleed  -Obtain iron studies and  -Monitor H&H  -Start PPI especially given treatment with steroids/sepsis    3. Sepsis  -Per admitting    4. Respiratory failure  -Patient requiring noninvasive positive pressure ventilation  -Per admitting/pulmonology    5. history of renal transplant  -Per admitting/nephrology    6. Multiple comorbidities -COPD, HTN, HLD, BPD, COPD, CHF, hypothyroidism, etc.  -Per admitting/pertinent consultants    Thank you for the opportunity see this patient in consultation    Analy Calhoun MD  1/17/2022  12:18 PM    NOTE:  This report was transcribed using voice recognition software. Every effort was made to ensure accuracy; however, inadvertent computerized transcription errors may be present.

## 2022-01-17 NOTE — PROGRESS NOTES
1/17/2022  [id number]  0515/0515-02      Patient unavailable for physical therapy treatment due to not recommended today per nursing. Will attempt treatment at a later time/date.         Jose Yeager PTA  LIC# [id number]

## 2022-01-17 NOTE — CONSULTS
Pulmonary/Critical Care Consult Note    CHIEF COMPLAINT: acute respiratory failure, somnlence    HISTORY OF PRESENT ILLNESS:   This is a 78-year-old female with significant past medical history of hypertension, hyperlipidemia, COPD, chronic renal failure status postrenal transplant, UTI, CVA, depression, hypothyroidism, lymphedema of both lower extremity presented with generalized weakness and lethargy. CT abdomen showed pneumatosis of ascending and transverse colon, suspected bowel ischemia. Urine culture also grew Enterococcus. Patient underwent exploratory laparoscopy on 1/15/2022, which revealed adhesions but no obvious bowel ischemia. ID consulted, patient was started on linezolid/cefepime/Flagyl. Patient was noticed somnolent over the last 2 days, this morning, she was very sleepy and barely opened her eyes. ABG obtained which showed a pH of 7.21, PCO2 64, PO2 84.6 while she was breathing 5 L oxygen per nasal cannula. KUB no evidence of bowel obstruction. CT of the chest showed bilateral lower lobe alveolar opacity compatible with pneumonia, chest pleural effusion and trace pericardial effusion with left ventricular dilation, no evidence to suggest empyema. BiPAP was ordered. Pulmonary critical care was consulted.     ALLERGY:  Macrodantin [nitrofurantoin macrocrystal] and Sulfa antibiotics    FAMILY HISTORY:  Family History   Problem Relation Age of Onset    Diabetes Mother     Diabetes Father     Dementia Father        SOCIAL HISTORY:  Social History     Socioeconomic History    Marital status:      Spouse name: Not on file    Number of children: Not on file    Years of education: Not on file    Highest education level: Not on file   Occupational History    Not on file   Tobacco Use    Smoking status: Former Smoker     Packs/day: 1.00     Years: 20.00     Pack years: 20.00     Quit date: 10/1/2015     Years since quittin.3    Smokeless tobacco: Never Used   Vaping Use    Vaping Use: Never used   Substance and Sexual Activity    Alcohol use: No     Comment: 2 cups coffee per day    Drug use: No    Sexual activity: Not on file   Other Topics Concern    Not on file   Social History Narrative    Not on file     Social Determinants of Health     Financial Resource Strain:     Difficulty of Paying Living Expenses: Not on file   Food Insecurity:     Worried About Running Out of Food in the Last Year: Not on file    Vann[place] of Food in the Last Year: Not on file   Transportation Needs:     Lack of Transportation (Medical): Not on file    Lack of Transportation (Non-Medical):  Not on file   Physical Activity:     Days of Exercise per Week: Not on file    Minutes of Exercise per Session: Not on file   Stress:     Feeling of Stress : Not on file   Social Connections:     Frequency of Communication with Friends and Family: Not on file    Frequency of Social Gatherings with Friends and Family: Not on file    Attends [employer] Services: Not on file    Active Member of [address] [place] [address] or Organizations: Not on file    Attends Club or Organization Meetings: Not on file    Marital Status: Not on file   Intimate Partner Violence:     Fear of Current or Ex-Partner: Not on file    Emotionally Abused: Not on file    Physically Abused: Not on file    Sexually Abused: Not on file   Housing Stability:     Unable to Pay for Housing in the Last Year: Not on file    Number of [place] in the Last Year: Not on file    Unstable Housing in the Last Year: Not on file       MEDICAL HISTORY:  Past Medical History:   Diagnosis Date    Abnormal EKG     left bundle branch block    Acute gout involving toe of right foot 9/3/2020    Acute gout involving toe of right foot 9/3/2020    Acute on chronic combined systolic (congestive) and diastolic (congestive) heart failure ([facility] [address]     Cancer ([facility] [address]     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    Cerebrovascular disease     Clotting disorder [facility]) 1985    thrombophlebitis after c section delivery    Depression     15 years followed by dr Meena Mancilla    Hyperlipidemia     Hypertension     Hyperthyroidism     Leg swelling 9/3/2020    Lymphedema of both lower extremities 9/3/2020    Peripheral vascular disease ([facility] [address]     Renal failure 11/2012    renal transplant    Thrombophlebitis     after c section delivery    Thyroid disease        MEDICATIONS:   metoprolol tartrate  50 mg Oral BID    docusate  100 mg Oral BID    mycophenolate  500 mg Oral BID    metroNIDAZOLE  500 mg IntraVENous Q8H    linezolid  600 mg IntraVENous Q12H    heparin (porcine)  5,000 Units SubCUTAneous BID    predniSONE  5 mg Oral Daily    heparin flush  1 mL IntraVENous 2 times per day    cefepime  2,000 mg IntraVENous Q24H    allopurinol  100 mg Oral Daily    budesonide  500 mcg Nebulization BID    desvenlafaxine succinate  50 mg Oral QPM    folic acid  1 mg Oral Daily    levothyroxine  50 mcg Oral Daily    atorvastatin  10 mg Oral Daily    ondansetron  4 mg Oral Once      sodium chloride 50 mL/hr at 01/17/22 0336    sodium chloride 25 mL (01/16/22 0035)     ondansetron **OR** ondansetron, polyethylene glycol, acetaminophen **OR** acetaminophen, sodium chloride flush, sodium chloride, heparin flush, ipratropium-albuterol    REVIEW OF SYSTEMS:  Unable to obtain due to patient mental status. PHYSICAL EXAM:  Vitals:    01/17/22 1145   BP:    Pulse:    Resp:    Temp:    SpO2: 99%     FiO2 : 60 % (ins 10/exp5)  O2 Flow Rate (L/min): 5.5 L/min  O2 Device: PAP (positive airway pressure)    Constitutional: Afebrile  Skin: Warm, dry, no rashes. HEENT: Atraumatic, normocephalic, PERRLA  Neck: Supple, no lymphadenopathy or thyromegaly. No carotid bruit. Cardiovascular: Normal S1 and S2, no murmur. Respiratory: Diminished breathing sound bilaterally. Gastrointestinal: Distended, hypoactive bowel sounds.   Genitourinary: Unremarkable  Extremities: 1+ pitting edema on both lower extremity  Neurological: Unable to assess  Psychological: Unable to assess    LABS:  WBC   Date Value Ref Range Status   01/15/2022 20.2 (H) 4.5 - 11.5 E9/L Final   01/14/2022 17.0 (H) 4.5 - 11.5 E9/L Final   01/13/2022 17.9 (H) 4.5 - 11.5 E9/L Final     Hemoglobin   Date Value Ref Range Status   01/15/2022 11.3 (L) 11.5 - 15.5 g/dL Final   01/14/2022 10.9 (L) 11.5 - 15.5 g/dL Final   01/13/2022 10.5 (L) 11.5 - 15.5 g/dL Final     Hematocrit   Date Value Ref Range Status   01/15/2022 37.9 34.0 - 48.0 % Final   01/14/2022 37.7 34.0 - 48.0 % Final   01/13/2022 36.0 34.0 - 48.0 % Final     MCV   Date Value Ref Range Status   01/15/2022 104.4 (H) 80.0 - 99.9 fL Final   01/14/2022 107.4 (H) 80.0 - 99.9 fL Final   01/13/2022 107.8 (H) 80.0 - 99.9 fL Final     Platelets   Date Value Ref Range Status   01/15/2022 192 130 - 450 E9/L Final   01/14/2022 179 130 - 450 E9/L Final   01/13/2022 167 130 - 450 E9/L Final     Sodium   Date Value Ref Range Status   01/15/2022 143 132 - 146 mmol/L Final   01/14/2022 141 132 - 146 mmol/L Final   01/13/2022 140 132 - 146 mmol/L Final     Potassium   Date Value Ref Range Status   01/15/2022 4.5 3.5 - 5.0 mmol/L Final   01/14/2022 4.8 3.5 - 5.0 mmol/L Final   01/13/2022 5.1 (H) 3.5 - 5.0 mmol/L Final     Potassium reflex Magnesium   Date Value Ref Range Status   01/10/2022 5.5 (H) 3.5 - 5.0 mmol/L Final     Comment:     Specimen is moderately Hemolyzed. Result may be artificially increased. 12/17/2019 5.2 (H) 3.5 - 5.0 mmol/L Final     Comment:     Specimen is moderately Hemolyzed. Result may be artificially increased.      Chloride   Date Value Ref Range Status   01/15/2022 111 (H) 98 - 107 mmol/L Final   01/14/2022 109 (H) 98 - 107 mmol/L Final   01/13/2022 109 (H) 98 - 107 mmol/L Final     CO2   Date Value Ref Range Status   01/15/2022 20 (L) 22 - 29 mmol/L Final   01/14/2022 21 (L) 22 - 29 mmol/L Final   01/13/2022 20 (L) 22 - 29 mmol/L Final     BUN   Date Value Ref Range Status   01/15/2022 36 (H) 6 - 23 mg/dL Final   01/14/2022 28 (H) 6 - 23 mg/dL Final   01/13/2022 25 (H) 6 - 23 mg/dL Final     CREATININE   Date Value Ref Range Status   01/15/2022 1.5 (H) 0.5 - 1.0 mg/dL Final   01/14/2022 1.4 (H) 0.5 - 1.0 mg/dL Final   01/13/2022 1.6 (H) 0.5 - 1.0 mg/dL Final     Glucose   Date Value Ref Range Status   01/15/2022 129 (H) 74 - 99 mg/dL Final   01/14/2022 114 (H) 74 - 99 mg/dL Final   01/13/2022 100 (H) 74 - 99 mg/dL Final     Calcium   Date Value Ref Range Status   01/15/2022 10.4 (H) 8.6 - 10.2 mg/dL Final   01/14/2022 10.5 (H) 8.6 - 10.2 mg/dL Final   01/13/2022 9.7 8.6 - 10.2 mg/dL Final     Total Protein   Date Value Ref Range Status   01/15/2022 5.7 (L) 6.4 - 8.3 g/dL Final   01/14/2022 5.8 (L) 6.4 - 8.3 g/dL Final   01/13/2022 5.6 (L) 6.4 - 8.3 g/dL Final     Albumin   Date Value Ref Range Status   01/15/2022 2.6 (L) 3.5 - 5.2 g/dL Final   01/14/2022 2.6 (L) 3.5 - 5.2 g/dL Final   01/13/2022 2.6 (L) 3.5 - 5.2 g/dL Final     Total Bilirubin   Date Value Ref Range Status   01/15/2022 0.7 0.0 - 1.2 mg/dL Final   01/14/2022 0.7 0.0 - 1.2 mg/dL Final   01/13/2022 0.7 0.0 - 1.2 mg/dL Final     Alkaline Phosphatase   Date Value Ref Range Status   01/15/2022 106 (H) 35 - 104 U/L Final   01/14/2022 106 (H) 35 - 104 U/L Final   01/13/2022 117 (H) 35 - 104 U/L Final     AST   Date Value Ref Range Status   01/15/2022 18 0 - 31 U/L Final   01/14/2022 15 0 - 31 U/L Final   01/13/2022 20 0 - 31 U/L Final     Comment:     Specimen is slightly Hemolyzed. Result may be artificially increased. ALT   Date Value Ref Range Status   01/15/2022 16 0 - 32 U/L Final   01/14/2022 17 0 - 32 U/L Final   01/13/2022 19 0 - 32 U/L Final     GFR Non-   Date Value Ref Range Status   01/15/2022 35 >=60 mL/min/1.73 Final     Comment:     Chronic Kidney Disease: less than 60 ml/min/1.73 sq.m. Kidney Failure: less than 15 ml/min/1.73 sq.m.   Results valid for patients 18 years and older.     01/14/2022 38 >=60 mL/min/1.73 Final     Comment:     Chronic Kidney Disease: less than 60 ml/min/1.73 sq.m. Kidney Failure: less than 15 ml/min/1.73 sq.m. Results valid for patients 18 years and older. 01/13/2022 32 >=60 mL/min/1.73 Final     Comment:     Chronic Kidney Disease: less than 60 ml/min/1.73 sq.m. Kidney Failure: less than 15 ml/min/1.73 sq.m. Results valid for patients 18 years and older. GFR    Date Value Ref Range Status   01/15/2022 42  Final   01/14/2022 46  Final   01/13/2022 39  Final     Magnesium   Date Value Ref Range Status   01/15/2022 1.8 1.6 - 2.6 mg/dL Final   01/14/2022 1.7 1.6 - 2.6 mg/dL Final   01/13/2022 1.7 1.6 - 2.6 mg/dL Final     Phosphorus   Date Value Ref Range Status   01/15/2022 2.3 (L) 2.5 - 4.5 mg/dL Final   01/14/2022 3.2 2.5 - 4.5 mg/dL Final   01/13/2022 3.5 2.5 - 4.5 mg/dL Final     Recent Labs     01/14/22  1631 01/14/22  1631 01/17/22  1004   PH 7.326*   < > 7.210*   PO2 65.4*  --   --    PCO2 43.3  --   --    HCO3 22.1  --   --    BE -3.8*   < > -3.3*   O2SAT 92.5   < > 93.4    < > = values in this interval not displayed. RADIOLOGY:  XR ABDOMEN (KUB) (SINGLE AP VIEW)   Final Result   No evidence of bowel obstruction. CT CHEST WO CONTRAST   Final Result   1. Bilateral lower lobe alveolar opacities compatible with pneumonia. Increased consolidation in the right lower lobe compared to the prior study. 2.  Trace effusions and trace pericardial effusion with left ventricular   dilatation. 3.  No evidence to suggest empyema. 4.  Gastric catheter passes into the stomach. XR CHEST PORTABLE   Final Result   Infiltrate and or atelectasis left greater than right with mildly improving   aeration in the left mid lung. MRI BRAIN WO CONTRAST   Final Result   No acute intracranial abnormality.          XR ABDOMEN FOR NG/OG/NE TUBE PLACEMENT   Final Result   NG tube in good position. XR CHEST PORTABLE   Final Result   Cardiomegaly. Findings for an infiltrate with some consolidation in the mid lower aspect of   the left lung. CT ABDOMEN PELVIS WO CONTRAST Additional Contrast? Oral   Final Result   There is pneumatosis involving the ascending to transverse colon. There is   wall thickening of the splenic flexure to descending colon. This is   nonspecific but can be seen related to ischemic enteritis but there is no   evidence for portal venous gas,, medication induced pneumatosis in a patient   taking steroids or chemotherapy, procedure related. Clinical correlation   recommended. Surgical consultation may be needed. There is fairly dense bibasilar infiltrates which have progressed from prior   exam.  Correlation with fever and white count and follow-up to resolution   recommended. Multiple mixed density cysts both kidneys related to chronic renal failure   with the transplant right lower quadrant. There is distention of small bowel in the lower pelvis without obstructing   lesion identified. Small hiatal hernia. XR ABDOMEN (KUB) (SINGLE AP VIEW)   Final Result   Abundant gas and stool throughout the colon may represent constipation. XR CHEST PORTABLE   Final Result   1. Stable cardiomegaly. There is no evidence of failure or pneumonia. CT ABDOMEN PELVIS WO CONTRAST Additional Contrast? None   Final Result   No significant fluid within the large bowel. No hydronephrosis of the transplant kidneys. The native kidneys are atrophic   and have multiple intermediate density cysts which should be further   evaluated with nonemergent ultrasound. Atherosclerotic disease. IR MIDLINE CATH    (Results Pending)       IMPRESSION:  1. Acute respiratory failure with hypercapnia. 2. Acute mental status change  3. Pneumatosis of colon, no evidence of bowel ischemia. 4. Bowel adhesion  5. UTI  6.  History of kidney transplant, immunosuppression agent. 7. Acute on chronic renal failure  8. Hypertension  9. COPD  10. Bipolar disorder   11. Hypothyroidism    PLAN:  1. The etiology of acute mental status remains to be determined. MRI of brain has no acute findings. CT chest showed possible pneumonia, patient already on broad-spectrum antibiotics cefepime/linezolid/Flagyl. Repeat urine no evidence of bowel obstruction. Patient has no lab work for today, stat lab ordered. 2. ABG suggest hypoventilation, patient placed back on BiPAP 16/5, repeat  ABG showed improved PCO2. 3. Patient medication list reviewed, she does not take large amount of narcotics or benzodiazepine. 4. If patient condition deteriorates, she will likely need to be intubated. Case discussed with Dr. Susan Lawson. ATTESTATION:  ICU Staff Physician note of personal involvement in Care  As the attending physician, I certify that I personally reviewed the patients history and personally examined the patient to confirm the physical findings described above,  And that I reviewed the relevant imaging studies and available reports. I also discussed the differential diagnosis and all of the proposed management plans with the patient and individuals accompanying the patient to this visit. They had the opportunity to ask questions about the proposed management plans and to have those questions answered. This patient has a high probability of sudden, clinically significant deterioration, which requires the highest level of physician preparedness to intervene urgently. I managed/supervised life or organ supporting interventions that required frequent physician assessment. I devoted my full attention to the direct care of this patient for the amount of time indicated below.   Time I spent with the family or surrogate(s) is included only if the patient was incapable of providing the necessary information or participating in medical decisions - Time devoted to teaching and to any procedures I billed separately is not included.     CRITICAL CARE TIME:  50 mins    Electronically signed by KATINA Martinez CNP on 1/17/2022 at 11:55 AM

## 2022-01-17 NOTE — PROGRESS NOTES
Reviewed case with Dr. Reid Skinner for any recommendations regarding abdominal distention.  Reviewed KUB

## 2022-01-17 NOTE — PROGRESS NOTES
RN attempted to draw stat lab orders-unable to acquire-Midline is not drawing/stick un-productive. Lab called for stat lab draw.

## 2022-01-18 ENCOUNTER — FOLLOWUP TELEPHONE ENCOUNTER (OUTPATIENT)
Dept: OTHER | Age: 67
End: 2022-01-18

## 2022-01-18 LAB
CYTOMEGALOVIRUS IGG ANTIBODY: NORMAL
CYTOMEGALOVIRUS IGM ANTIBODY: NORMAL
EOSINOPHIL, URINE: 0 % (ref 0–1)
PRO-BNP: 2701 PG/ML (ref 0–125)

## 2022-01-18 PROCEDURE — 2500000003 HC RX 250 WO HCPCS: Performed by: SURGERY

## 2022-01-18 PROCEDURE — 2580000003 HC RX 258: Performed by: SPECIALIST

## 2022-01-18 PROCEDURE — 2500000003 HC RX 250 WO HCPCS: Performed by: SPECIALIST

## 2022-01-18 PROCEDURE — 6360000002 HC RX W HCPCS: Performed by: HOSPITALIST

## 2022-01-18 PROCEDURE — 6370000000 HC RX 637 (ALT 250 FOR IP): Performed by: INTERNAL MEDICINE

## 2022-01-18 PROCEDURE — 2580000003 HC RX 258: Performed by: HOSPITALIST

## 2022-01-18 PROCEDURE — 6370000000 HC RX 637 (ALT 250 FOR IP): Performed by: SURGERY

## 2022-01-18 PROCEDURE — C9113 INJ PANTOPRAZOLE SODIUM, VIA: HCPCS | Performed by: HOSPITALIST

## 2022-01-18 PROCEDURE — 83880 ASSAY OF NATRIURETIC PEPTIDE: CPT

## 2022-01-18 PROCEDURE — A4216 STERILE WATER/SALINE, 10 ML: HCPCS | Performed by: HOSPITALIST

## 2022-01-18 PROCEDURE — 6360000002 HC RX W HCPCS: Performed by: SURGERY

## 2022-01-18 PROCEDURE — 6360000002 HC RX W HCPCS: Performed by: INTERNAL MEDICINE

## 2022-01-18 PROCEDURE — 2060000000 HC ICU INTERMEDIATE R&B

## 2022-01-18 PROCEDURE — 2700000000 HC OXYGEN THERAPY PER DAY

## 2022-01-18 PROCEDURE — 36415 COLL VENOUS BLD VENIPUNCTURE: CPT

## 2022-01-18 PROCEDURE — 94660 CPAP INITIATION&MGMT: CPT

## 2022-01-18 PROCEDURE — 6360000002 HC RX W HCPCS: Performed by: SPECIALIST

## 2022-01-18 PROCEDURE — 94640 AIRWAY INHALATION TREATMENT: CPT

## 2022-01-18 PROCEDURE — 2580000003 HC RX 258: Performed by: INTERNAL MEDICINE

## 2022-01-18 RX ORDER — IPRATROPIUM BROMIDE AND ALBUTEROL SULFATE 2.5; .5 MG/3ML; MG/3ML
3 SOLUTION RESPIRATORY (INHALATION) 4 TIMES DAILY
Status: DISCONTINUED | OUTPATIENT
Start: 2022-01-18 | End: 2022-01-25 | Stop reason: HOSPADM

## 2022-01-18 RX ORDER — MORPHINE SULFATE 2 MG/ML
2 INJECTION, SOLUTION INTRAMUSCULAR; INTRAVENOUS EVERY 4 HOURS PRN
Status: DISCONTINUED | OUTPATIENT
Start: 2022-01-18 | End: 2022-01-21

## 2022-01-18 RX ADMIN — METOPROLOL TARTRATE 50 MG: 50 TABLET, FILM COATED ORAL at 22:41

## 2022-01-18 RX ADMIN — ATORVASTATIN CALCIUM 10 MG: 10 TABLET, FILM COATED ORAL at 11:09

## 2022-01-18 RX ADMIN — PANTOPRAZOLE SODIUM 40 MG: 40 INJECTION, POWDER, FOR SOLUTION INTRAVENOUS at 22:42

## 2022-01-18 RX ADMIN — HEPARIN 100 UNITS: 100 SYRINGE at 09:00

## 2022-01-18 RX ADMIN — BUDESONIDE 500 MCG: 0.5 INHALANT RESPIRATORY (INHALATION) at 17:43

## 2022-01-18 RX ADMIN — IPRATROPIUM BROMIDE AND ALBUTEROL SULFATE 3 ML: .5; 3 SOLUTION RESPIRATORY (INHALATION) at 10:13

## 2022-01-18 RX ADMIN — BUDESONIDE 500 MCG: 0.5 INHALANT RESPIRATORY (INHALATION) at 06:22

## 2022-01-18 RX ADMIN — CEFEPIME HYDROCHLORIDE 2000 MG: 2 INJECTION, POWDER, FOR SOLUTION INTRAVENOUS at 16:48

## 2022-01-18 RX ADMIN — DOCUSATE SODIUM 100 MG: 50 LIQUID ORAL at 11:09

## 2022-01-18 RX ADMIN — DOCUSATE SODIUM 100 MG: 50 LIQUID ORAL at 23:34

## 2022-01-18 RX ADMIN — PREDNISONE 5 MG: 5 TABLET ORAL at 11:09

## 2022-01-18 RX ADMIN — IPRATROPIUM BROMIDE AND ALBUTEROL SULFATE 3 ML: .5; 3 SOLUTION RESPIRATORY (INHALATION) at 13:24

## 2022-01-18 RX ADMIN — PANTOPRAZOLE SODIUM 40 MG: 40 INJECTION, POWDER, FOR SOLUTION INTRAVENOUS at 11:09

## 2022-01-18 RX ADMIN — SODIUM CHLORIDE, PRESERVATIVE FREE 10 ML: 5 INJECTION INTRAVENOUS at 23:34

## 2022-01-18 RX ADMIN — FOLIC ACID 1 MG: 1 TABLET ORAL at 11:09

## 2022-01-18 RX ADMIN — METRONIDAZOLE 500 MG: 500 INJECTION, SOLUTION INTRAVENOUS at 15:25

## 2022-01-18 RX ADMIN — IPRATROPIUM BROMIDE AND ALBUTEROL SULFATE 3 ML: .5; 3 SOLUTION RESPIRATORY (INHALATION) at 17:43

## 2022-01-18 RX ADMIN — METOPROLOL TARTRATE 50 MG: 50 TABLET, FILM COATED ORAL at 11:09

## 2022-01-18 RX ADMIN — ALLOPURINOL 100 MG: 100 TABLET ORAL at 11:09

## 2022-01-18 RX ADMIN — SODIUM CHLORIDE: 9 INJECTION, SOLUTION INTRAVENOUS at 06:20

## 2022-01-18 RX ADMIN — DESVENLAFAXINE SUCCINATE 50 MG: 50 TABLET, FILM COATED, EXTENDED RELEASE ORAL at 18:45

## 2022-01-18 RX ADMIN — HEPARIN SODIUM 5000 UNITS: 5000 INJECTION INTRAVENOUS; SUBCUTANEOUS at 12:29

## 2022-01-18 RX ADMIN — METRONIDAZOLE 500 MG: 500 INJECTION, SOLUTION INTRAVENOUS at 22:42

## 2022-01-18 RX ADMIN — METRONIDAZOLE 500 MG: 500 INJECTION, SOLUTION INTRAVENOUS at 06:20

## 2022-01-18 RX ADMIN — HEPARIN 100 UNITS: 100 SYRINGE at 22:42

## 2022-01-18 RX ADMIN — LEVOTHYROXINE SODIUM 50 MCG: 0.05 TABLET ORAL at 11:09

## 2022-01-18 RX ADMIN — HEPARIN SODIUM 5000 UNITS: 5000 INJECTION INTRAVENOUS; SUBCUTANEOUS at 22:41

## 2022-01-18 RX ADMIN — SODIUM CHLORIDE, PRESERVATIVE FREE 10 ML: 5 INJECTION INTRAVENOUS at 11:09

## 2022-01-18 RX ADMIN — LINEZOLID 600 MG: 600 INJECTION, SOLUTION INTRAVENOUS at 11:42

## 2022-01-18 RX ADMIN — LINEZOLID 600 MG: 600 INJECTION, SOLUTION INTRAVENOUS at 18:45

## 2022-01-18 RX ADMIN — FLUCONAZOLE 400 MG: 2 INJECTION INTRAVENOUS at 15:24

## 2022-01-18 ASSESSMENT — PAIN SCALES - GENERAL
PAINLEVEL_OUTOF10: 0
PAINLEVEL_OUTOF10: 0

## 2022-01-18 NOTE — TELEPHONE ENCOUNTER
Geraldine Morgan 1955 to follow with cardiologist DR Santiago Garcia when stable and outpatient. Dr can determine if chf clinic services approriate at that time. Needs to be ambulatory and alert and oriented. Follows with DR Riddhi Chaudhari nephrology. Chronic heart failure this admission.      Ga Nuñez RN

## 2022-01-18 NOTE — PROGRESS NOTES
[facility]    [address] [place] [address]  [place], [address]  Phone [phone]   [phone]      Admit Date: 1/10/2022  6:12 PM  Pt Name: Shannon Sharma  MRN: [id number]  : 1955  Reason for Consult:    Chief Complaint   Patient presents with    Fatigue     patient attempted to go to pcp today and didnt have the strength. came here instead. patient states sepsis prior to thanksgiving requiring admission and rehab. Requesting Physician:  Roda Meckel, MD  PCP: Roda Meckel, MD  History Obtained From:  patient, chart   ID consulted for CECILY (acute kidney injury) ([address] [N17.9]  on hospital day [address]       Chief Complaint   Patient presents with    Fatigue     patient attempted to go to pcp today and didnt have the strength. came here instead. patient states sepsis prior to thanksgiving requiring admission and rehab. HISTORYOF PRESENT ILLNESS   Shannon Sharma is a 73 y.o. female who presents with   has a past medical history of Abnormal EKG, Acute gout involving toe of right foot, Acute gout involving toe of right foot, Acute on chronic combined systolic (congestive) and diastolic (congestive) heart failure ([address] Cancer ([address] Cerebrovascular disease, Clotting disorder ([address] Depression, Hyperlipidemia, Hypertension, Hyperthyroidism, Leg swelling, Lymphedema of both lower extremities, Peripheral vascular disease ([address] Renal failure, Thrombophlebitis, and Thyroid disease. Fatigue  Associated symptoms include fatigue.      PT FOLLOWED UP WITH ID ON  S/P ATBX  STARTED ON FLUCONAZOLE/MYCOSTSATIN POWDER FOR INTERTRIGO   PT COMES IN NOW WITH FATIGUE DEC APPETITE NAUSEA   TMAX99.4 ON RA  CR2.5 WBC17.9  COVID NEG   BLOOD CX PENDING   ATBX was initiated   Not interactive  denies f/c  Just fatigue    Dos  22   bipap still lethargic  ngt    2022  BIPAP LETHARGIC DOES RESPONDS HAS NGT  2022   ptis in bed g, Oral, Daily PRN, Alec Ramirez MD, 17 g at 01/13/22 1611    acetaminophen (TYLENOL) tablet 650 mg, 650 mg, Oral, Q6H PRN, 650 mg at 01/11/22 1819 **OR** acetaminophen (TYLENOL) suppository 650 mg, 650 mg, Rectal, Q6H PRN, Alec Ramirez MD    predniSONE (DELTASONE) tablet 5 mg, 5 mg, Oral, Daily, Dylan Logan MD, 5 mg at 01/18/22 1109    sodium chloride flush 0.9 % injection 5-40 mL, 5-40 mL, IntraVENous, PRN, Susannah Ramirez MD, 10 mL at 01/17/22 1601    0.9 % sodium chloride infusion, 25 mL, IntraVENous, PRN, Keyon Escobedo MD, Last Rate: 100 mL/hr at 01/16/22 0035, 25 mL at 01/16/22 0035    heparin flush 100 UNIT/ML injection 100 Units, 1 mL, IntraVENous, 2 times per day, Keyon Escobedo MD, 100 Units at 01/17/22 2245    heparin flush 100 UNIT/ML injection 100 Units, 1 mL, IntraCATHeter, PRN, Alec Ramirez MD    cefepime (MAXIPIME) 2000 mg IVPB extended (mini-bag), 2,000 mg, IntraVENous, Q24H, Hope Charles MD, Stopped at 01/17/22 2213    allopurinol (ZYLOPRIM) tablet 100 mg, 100 mg, Oral, Daily, Susannah Ramirez MD, 100 mg at 01/18/22 1109    budesonide (PULMICORT) nebulizer suspension 500 mcg, 500 mcg, Nebulization, BID, Susannah Ramirez MD, 500 mcg at 01/18/22 6009    desvenlafaxine succinate (PRISTIQ) extended release tablet 50 mg, 50 mg, Oral, QPM, Susannah Ramirez MD, 50 mg at 34/29/81 6239    folic acid (FOLVITE) tablet 1 mg, 1 mg, Oral, Daily, Susannah Ramirez MD, 1 mg at 01/18/22 1109    levothyroxine (SYNTHROID) tablet 50 mcg, 50 mcg, Oral, Daily, Susannah Ramirez MD, 50 mcg at 01/18/22 1109    atorvastatin (LIPITOR) tablet 10 mg, 10 mg, Oral, Daily, Susannah Ramirez MD, 10 mg at 01/18/22 1109    ondansetron (ZOFRAN-ODT) disintegrating tablet 4 mg, 4 mg, Oral, Once, Yuki Mcghee MD  ALLERGIES     Macrodantin [nitrofurantoin macrocrystal] and Sulfa antibiotics  Immunization History   Administered Date(s) Administered    COVID-19, [employer] Peter, PF, 30mcg/0.3mL 01/28/2021, 02/18/2021, 08/18/2021      Internal Administration   First Dose COVID-19, [employer], PF, 30mcg/0.3mL  01/28/2021   Second Dose COVID-19, [employer], PF, 30mcg/0.3mL   02/18/2021       Last COVID Lab SARS-CoV-2 (no units)   Date Value   01/20/2021 Not Detected     SARS-CoV-2 Antibody, Total (no units)   Date Value   01/13/2022 Non-Reactive     SARS-CoV-2, PCR (no units)   Date Value   01/11/2022 Not Detected     SARS-CoV-2, NAAT (no units)   Date Value   01/10/2022 Not Detected            PAST MEDICAL HISTORY     Past Medical History:   Diagnosis Date    Abnormal EKG     left bundle branch block    Acute gout involving toe of right foot 9/3/2020    Acute gout involving toe of right foot 9/3/2020    Acute on chronic combined systolic (congestive) and diastolic (congestive) heart failure (HCC)     Cancer (HCC)     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    Cerebrovascular disease     Clotting disorder ([address] 1985    thrombophlebitis after c section delivery    Depression     15 years followed by dr Michael Uriostegui Hyperlipidemia     Hypertension     Hyperthyroidism     Leg swelling 9/3/2020    Lymphedema of both lower extremities 9/3/2020    Peripheral vascular disease ([address]     Renal failure 11/2012    renal transplant    Thrombophlebitis     after c section delivery    Thyroid disease      SURGICAL HISTORY       Past Surgical History:   Procedure Laterality Date   [address]    one normal delivery    COLECTOMY N/A 1/15/2022    DIAGNOSTIC  LAPAROSCOPY LYSIS OF ADHESIONS performed by Heidi Burger MD at [address] [facility] [address] CATH LAB PROCEDURE  2006    normal coronaries and 1996 normal coronaries    DIALYSIS FISTULA CREATION  march and july 2012    phase one and two patient will start dialysis when needed   [address]    transfemoral venous bypass grafts    KIDNEY TRANSPLANT  2016    KIDNEY TRANSPLANT  2015    KIDNEY TRANSPLANT  2015    PARATHYROIDECTOMY 2006    left parathyroid  implant and parathyroidectomy     PHYSICAL EXAM       Vitals:    01/18/22 0625 01/18/22 0846 01/18/22 1017 01/18/22 1324   BP:  122/65     Pulse:  80     Resp: 14 18 27 24   Temp:  98.3 °F (36.8 °C)     TempSrc:  Axillary     SpO2:  98%     Weight:       Height:         Physical Exam   Constitutional/General:   IN BED  Head: NC/AT  Pulmonary: Lungs  DEc   to auscultation bilaterally ant . Not in respiratory distress on  BIPAP  Cardiovascular:  Regular rate and rhythm   Abdomen: Soft, dec  BS. distension. ngT NT  Extremities:     Warm and well perfused edema  Skin: Warm and dry   Neurologic:    lethargic   RIGHT MIDLINE  MERCHANT      DIAGNOSTIC RESULTS   RADIOLOGY:   CT ABDOMEN PELVIS WO CONTRAST Additional Contrast? None    Result Date: 1/10/2022  EXAMINATION: CT OF THE ABDOMEN AND PELVIS WITHOUT CONTRAST 1/10/2022 6:56 pm TECHNIQUE: CT of the abdomen and pelvis was performed without the administration of intravenous contrast. Multiplanar reformatted images are provided for review. Dose modulation, iterative reconstruction, and/or weight based adjustment of the mA/kV was utilized to reduce the radiation dose to as low as reasonably achievable. COMPARISON: CT abdomen pelvis 11/17/2021 HISTORY: ORDERING SYSTEM PROVIDED HISTORY: evaluate for diarrhea TECHNOLOGIST PROVIDED HISTORY: Reason for exam:->evaluate for diarrhea Additional Contrast?->None Decision Support Exception - unselect if not a suspected or confirmed emergency medical condition->Emergency Medical Condition (MA) FINDINGS: Lower Chest: Atherosclerotic disease. Atrophic kidneys with multiple cysts. Some of the presumed cysts appear hyperdense. Subcentimeter foci in the kidneys are too small to accurately characterize. There is a transplant kidney in the right lower quadrant. There is no hydronephrosis of the transplant kidney. Decreased perinephric stranding above the transplant kidney. Unremarkable bladder.  Unremarkable appearance of the reproductive organs. No abdominal aortic aneurysm. Atherosclerotic disease. No evidence of cholecystitis or pancreatitis. No free air or significant free fluid. No evidence of bowel obstruction. Nondilated appendix. No significant fluid within the large bowel. Small fat containing umbilical hernia. Mild degenerative changes of the spine. Presumed surgical scars in the anterior abdominal wall. No significant fluid within the large bowel. No hydronephrosis of the transplant kidneys. The native kidneys are atrophic and have multiple intermediate density cysts which should be further evaluated with nonemergent ultrasound. Atherosclerotic disease. XR CHEST PORTABLE    Result Date: 1/11/2022  EXAMINATION: ONE XRAY VIEW OF THE CHEST 1/11/2022 9:11 am COMPARISON: None. HISTORY: ORDERING SYSTEM PROVIDED HISTORY: shortness of breath TECHNOLOGIST PROVIDED HISTORY: Reason for exam:->shortness of breath FINDINGS: The heart is enlarged. There are no findings of failure. The lungs are clear. There is no focal infiltrate or effusion. 1. Stable cardiomegaly. There is no evidence of failure or pneumonia. LABS  Recent Labs     01/17/22 1938   WBC 21.2*   HGB 10.5*   HCT 37.1   .1*        Recent Labs     01/17/22 1938      K 5.2*   *   CO2 23   BUN 72*   CREATININE 2.2*   GFRAA 27   LABGLOM 22   GLUCOSE 119*   PROT 4.8*  4.8*   LABALBU 2.3*  2.3*   CALCIUM 10.1   BILITOT 0.4  0.4   ALKPHOS 74  76   AST 13  12   ALT 12  13     No results for input(s): PROCAL in the last 72 hours.   Lab Results   Component Value Date    CRP 34.6 (H) 11/17/2021     Lab Results   Component Value Date    SEDRATE 62 (H) 11/20/2021     No results found for: [id number]  Lab Results   Component Value Date    COVID19 Non-Reactive 01/13/2022    COVID19 Not Detected 01/11/2022     COVID-19/MARINA-COV2 LABS  Recent Labs     01/17/22 1938   AST 13  12   ALT 12  13 MICROBIOLOGY:  Lab Results   Component Value Date    BC 5 Days no growth 01/11/2022    BC 5 Days no growth 11/17/2021    BC  12/17/2019     Refer to previous culture of CXBLD from 12-17-19 @1120 for  susceptibility results      ORG Enterococcus faecalis 01/12/2022    ORG Klebsiella pneumoniae ssp pneumoniae 11/17/2021    ORG Staphylococcus coagulase-negative 11/17/2021    ORG Klebsiella pneumoniae ssp pneumoniae 11/17/2021     Lab Results   Component Value Date    BLOODCULT2 5 Days no growth 01/12/2022    BLOODCULT2  11/17/2021     This organism was isolated in one set. Susceptibility testing is not routinely done as this  organism frequently represents skin contamination. Additional testing can be ordered by calling the  Microbiology Department. ORG Enterococcus faecalis 01/12/2022    ORG Klebsiella pneumoniae ssp pneumoniae 11/17/2021    ORG Staphylococcus coagulase-negative 11/17/2021    ORG Klebsiella pneumoniae ssp pneumoniae 11/17/2021        Urine Culture, Routine   Date Value Ref Range Status   01/12/2022 >100,000 CFU/ml  Final   11/17/2021 <10,000 CFU/mL  Mixed gram positive organisms   (A)  Final   11/17/2021 >100,000 CFU/ml  Final     MRSA Culture Only   Date Value Ref Range Status   11/17/2021 Methicillin resistant Staph aureus not isolated  Final          FINAL IMPRESSION    Patient is a 73 y.o. female who presented with   Chief Complaint   Patient presents with    Fatigue     patient attempted to go to pcp today and didnt have the strength. came here instead. patient states sepsis prior to thanksgiving requiring admission and rehab.    and admitted for CECILY (acute kidney injury) ([facility] [N17.9]   IMMUNOCOMPROMISED PT RENAL TRANSPLANT  LEUKOCYTOSIS  pneumatosis involving the ascending to transverse colon. S/p BOWEL RESECTION LAPAROSCOPIC  Lysis of adhesions  Enterococcus faecalis   uti    ?  HCAP   CECILY    FUNGITELL +     S/P RX KLEBSIELLA/CONS BACTEREMIA  S/P RX E COLI UTI       mycophenolate (CELLCEPT) 200 MG/ML suspension 500 mg, BID  metronidazole (FLAGYL) 500 mg in NaCl 100 mL IVPB premix, Q8H  linezolid (ZYVOX) IVPB 600 mg, Q12H     predniSONE (DELTASONE) tablet 5 mg, Daily     cefepime (MAXIPIME) 2000 mg IVPB extended (mini-bag), Q24H       Cont  ANTFUNGAL     Cont atbx         Imaging and labs were reviewed per medical records       Thank you for involving me in the care of Young Covarrubias. Please do not hesitate to call for any questions or concerns.          Electronically signed by Wayne Monk MD on 1/18/2022 at 3:29 PM

## 2022-01-18 NOTE — PROGRESS NOTES
Attempts for morning labs unsuccessful, call out to , spoke with call center notified of poor peripheral access. Midline unable to draw labs. Electronically signed by Wesley Stephenson RN on 1/18/2022 at 4:12 PM

## 2022-01-18 NOTE — PROGRESS NOTES
PROGRESS NOTE  By Marlyn Graves M.D. [facility]  Dr. Dmitri Patel M.D.,  Dr. La Calderón M.D.,   Dr. Che Ramos D.O.,  Dr. Vishnu Beyer M.D.,  Dr. Vivi Ortiz D.O.,  Sai Nebwy D.O. [employer]  73 y.o.  female    SUBJECTIVE:  Remains lethargic and on BiPAP    OBJECTIVE:    /65   Pulse 80   Temp 98.3 °F (36.8 °C) (Axillary)   Resp 24   Ht 5' 2\" (1.575 m)   Wt 218 lb 12.8 oz (99.2 kg)   SpO2 98%   BMI 40.02 kg/m²     General: Lethargic/obese  female  HEENT: BiPAP mask/NG tube  Neck: Supple with trachea midline  Chest: Symmetric excursion/NIPPV  Cor: Appears regular  Abd.: Soft and obese. Bowel sounds hypoactive. Surgical scars and incisions  Extr.:  Bilateral lower extremity 2+ edema  Skin: Warm and dry    DATA:    Monitor data reviewed -sinus rhythm noted. Lab Results   Component Value Date    WBC 21.2 01/17/2022    RBC 3.40 01/17/2022    HGB 10.5 01/17/2022    HCT 37.1 01/17/2022    .1 01/17/2022    MCH 30.9 01/17/2022    MCHC 28.3 01/17/2022    RDW 15.9 01/17/2022     01/17/2022    MPV 12.8 01/17/2022     Lab Results   Component Value Date     01/17/2022    K 5.2 01/17/2022    K 5.5 01/10/2022     01/17/2022    CO2 23 01/17/2022    BUN 72 01/17/2022    CREATININE 2.2 01/17/2022    CALCIUM 10.1 01/17/2022    PROT 4.8 01/17/2022    PROT 4.8 01/17/2022    LABALBU 2.3 01/17/2022    LABALBU 2.3 01/17/2022    BILITOT 0.4 01/17/2022    BILITOT 0.4 01/17/2022    ALKPHOS 76 01/17/2022    ALKPHOS 74 01/17/2022    AST 12 01/17/2022    AST 13 01/17/2022    ALT 13 01/17/2022    ALT 12 01/17/2022     Lab Results   Component Value Date    LIPASE 19 11/17/2021     No results found for: AMYLASE      ASSESSMENT/PLAN:    1.   Abdominal distention/  -Possibly secondary to body habitus/adynamic ileus secondary to acute illness/recent laparoscopic procedure -cannot exclude Tam's syndrome  -General surgery on case -input appreciated  -KUB as ordered has not been performed  -Okay for liquid diet from GI POV    2. Anemia  -Chronic/macrocytic  -H&H at baseline without evidence of overt bleed  -Pending iron studies and FOBT  -Monitor H&H  -Continue PPI     3. Sepsis  -Per admitting     4. Respiratory failure  -Patient requiring noninvasive positive pressure ventilation  -Per admitting/pulmonology     5. history of renal transplant  -Per admitting/nephrology     6.  Multiple comorbidities -COPD, HTN, HLD, BPD, COPD, CHF, hypothyroidism, etc.  -Per admitting/pertinent consultants    Jamin Rodrigues MD  1/18/2022  2:16 PM    NOTE:  This report was transcribed using voice recognition software. Every effort was made to ensure accuracy; however, inadvertent computerized transcription errors may be present.

## 2022-01-18 NOTE — PROGRESS NOTES
[facility] MD Chu Stoddard MD. Jenny Bull MD   Progress Note    1/17/2022    SUBJECTIVE:   1/13:Seen in her room , lying flat on o2/nc , euvolemic , confused   D/w RN on floor   1/14: Patient was seen, interviewed and examined in her room, overall feeling much better with less confusion today. Discussed the case in detail with the nurse, patient had history of kidney transplant in 2015, has maintained of 2 medications including cyclosporine and MMF. We will check with lab to obtain values both medications random throughout toxicity levels that could be responsible for her confusion at one point or another. 1/15: Patient was seen in her room. Case discussed with her son was informing me that, she was scheduled for expiratory laparotomy for probable obstruction of her bowels as per general surgery. We will follow clinical status closely with surgery or any other intervention needed from nephrology. 1/16: Patient underwent yesterday laparoscopic lysis of adhesions with no other pathology being found as per general surgery, will keep following her renal status closely with BUN today is 36 and creatinine down to 1.5 overall stable  1/17 : sleeping on bipap when seeing her   No LE edema   Cr creeping up   General surgery feel there is no intraabdominal pathology   PROBLEM LIST:    Active Problems:    CECILY (acute kidney injury) ([address]    Ischemic bowel disease ([address]  Resolved Problems:    * No resolved hospital problems.  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    Cerebrovascular disease     Clotting disorder ([facility] 1985    thrombophlebitis after c section delivery    Depression     15 years followed by dr Lakeshia Johnston Hyperlipidemia     Hypertension     Hyperthyroidism     Leg swelling 9/3/2020    Lymphedema of both lower extremities 9/3/2020    Peripheral vascular disease ([facility]     Renal failure 11/2012    renal transplant    Thrombophlebitis     after c section delivery    Thyroid disease        Past Surgical History:   Procedure Laterality Date   [address]    one normal delivery    COLECTOMY N/A 1/15/2022    DIAGNOSTIC  LAPAROSCOPY LYSIS OF ADHESIONS performed by Darrell Gaona MD at [address] [place] [address] Drive CATH LAB PROCEDURE  2006    normal coronaries and 1996 normal coronaries    DIALYSIS FISTULA CREATION  march and july 2012    phase one and two patient will start dialysis when needed   [address]    transfemoral venous bypass grafts    KIDNEY TRANSPLANT  2016   [facility]  2015   [facility]  2015    PARATHYROIDECTOMY  2006    left parathyroid  implant and parathyroidectomy       Family History   Problem Relation Age of Onset    Diabetes Mother     Diabetes Father     Dementia Father         reports that she quit smoking about 6 years ago. She has a 20.00 pack-year smoking history. She has never used smokeless tobacco. She reports that she does not drink alcohol and does not use drugs. Review of Systems:   Constitutional: no fevers , no chills , feels ok   Eyes: no eye pain , no itching , no drainage  Ears, nose, mouth, throat, and face: no ear ,nose pain , hearing is ok ,no nasal drainage   Respiratory: no sob ,no cough ,no wheezing . Cardiovascular: no chest pain , no palpitation ,no sob . Gastrointestinal: no nausea, vomiting , constipation , no abdominal pain .    Genitourinary:no urinary retention , no burning , dysuria . No polyuria   Hematologic/lymphatic: no bleeding , no cougulation issues . Musculoskeletal:no joint pain , no swelling . Neurological: no headaches ,no weakness , no numbness . Endocrine: no thirst , no weight issues .      MEDS (scheduled):    pantoprazole  40 mg IntraVENous BID    And    sodium chloride (PF)  10 mL IntraVENous BID    fluconazole  400 mg IntraVENous Q24H    metoprolol tartrate  50 mg Oral BID    docusate  100 mg Oral BID    metroNIDAZOLE  500 mg IntraVENous Q8H    linezolid  600 mg IntraVENous Q12H    heparin (porcine)  5,000 Units SubCUTAneous BID    predniSONE  5 mg Oral Daily    heparin flush  1 mL IntraVENous 2 times per day    cefepime  2,000 mg IntraVENous Q24H    allopurinol  100 mg Oral Daily    budesonide  500 mcg Nebulization BID    desvenlafaxine succinate  50 mg Oral QPM    folic acid  1 mg Oral Daily    levothyroxine  50 mcg Oral Daily    atorvastatin  10 mg Oral Daily    ondansetron  4 mg Oral Once       MEDS (infusions):   sodium chloride      sodium chloride 25 mL (01/16/22 0035)       MEDS (prn):  ondansetron **OR** ondansetron, polyethylene glycol, acetaminophen **OR** acetaminophen, sodium chloride flush, sodium chloride, heparin flush, ipratropium-albuterol    PHYSICAL EXAM:     Patient Vitals for the past 24 hrs:   BP Temp Temp src Pulse Resp SpO2   01/17/22 2123 -- -- -- -- 14 --   01/17/22 2015 (!) 109/58 98 °F (36.7 °C) Infrared 69 14 99 %   01/17/22 1415 -- -- -- -- 13 99 %   01/17/22 1215 131/65 98.1 °F (36.7 °C) Axillary 75 14 99 %   01/17/22 1145 -- -- -- -- -- 99 %   01/17/22 0800 (!) 124/59 98 °F (36.7 °C) Axillary 74 16 97 %   01/17/22 0015 (!) 109/58 97.5 °F (36.4 °C) Axillary 78 17 98 %   01/16/22 2201 (!) 109/58 -- -- 80 -- --   @      Intake/Output Summary (Last 24 hours) at 1/17/2022 2003  Last data filed at 1/17/2022 1728  Gross per 24 hour   Intake 410 ml   Output 1700 ml   Net -1290 ml         Wt Readings from Last 3 Encounters:   01/11/22 218 lb 12.8 oz (99.2 kg)   11/17/21 227 lb 3.2 oz (103.1 kg)   08/16/21 229 lb (103.9 kg)       Constitutional:  in no acute distress  Oral: mucus membranes moist  Neck: no JVD  Cardiovascular: S1, S2 regular rhythm, no murmur,or rub  Respiratory:  No crackles, no wheeze  Gastrointestinal:  Soft, nontender, nondistended, NABS  Ext: no edema, feet warm  Skin: dry, no rash  Neuro: awake, alert, interactive      DATA:    Recent Labs     01/15/22  0828 01/17/22 1938   WBC 20.2* 21.2*   HGB 11.3* 10.5*   HCT 37.9 37.1   .4* 109.1*    174     Recent Labs     01/15/22  0828 01/17/22  1938    143   K 4.5 5.2*   * 113*   CO2 20* 23   MG 1.8 2.3   PHOS 2.3* 4.4   BUN 36* 72*   CREATININE 1.5* 2.2*   ALT 16 12  13   AST 18 13  12   BILIDIR  --  0.3   BILITOT 0.7 0.4  0.4   ALKPHOS 106* 74  76       No results found for: LABPROT    ASSESSMENT / RECOMMENDATIONS:      1. Acute kidney injury, felt to be related to volume depletion and  consists of diarrhea and poor p.o. intake. CECILY resolving with BUN today of 28 and creatinine down to 1.4.  2.  Chronic kidney disease, stage III. The patient with kidney  transplant. Baseline creatinine 1.5.  3. Immunosuppressant host, chronically on cyclosporin 100 mg b.i.d.,  CellCept 1000 mg b.i.d., and prednisone 5 mg. 4.  Sepsis. Need to rule out a C. diff. infection as the patient is  reporting diarrhea. 5.  Obesity. 6.  Hypotension.     PLAN:    Worsening clinical status   No intraabdominal pathology per general surgery   Cr worsening     Check urine lytes   Re immunosuppression in general if we choose to hold an Immunosuppression we choose to hold MMF(anti metabolite ) rather cyclosporin (calciurine inhibitor ).    Bmp in am       Electronically signed by Ya Read MD on 1/17/2022 at 9:49 PM

## 2022-01-18 NOTE — PROGRESS NOTES
[facility] MD Yasmeen Perez MD. João Schroeder MD   Progress Note    1/18/2022    SUBJECTIVE:   1/13:Seen in her room , lying flat on o2/nc , euvolemic , confused   D/w RN on floor   1/14: Patient was seen, interviewed and examined in her room, overall feeling much better with less confusion today. Discussed the case in detail with the nurse, patient had history of kidney transplant in 2015, has maintained of 2 medications including cyclosporine and MMF. We will check with lab to obtain values both medications random throughout toxicity levels that could be responsible for her confusion at one point or another. 1/15: Patient was seen in her room. Case discussed with her son was informing me that, she was scheduled for expiratory laparotomy for probable obstruction of her bowels as per general surgery. We will follow clinical status closely with surgery or any other intervention needed from nephrology. 1/16: Patient underwent yesterday laparoscopic lysis of adhesions with no other pathology being found as per general surgery, will keep following her renal status closely with BUN today is 36 and creatinine down to 1.5 overall stable  1/17 : sleeping on bipap when seeing her   No LE edema   Cr creeping up   General surgery feel there is no intraabdominal pathology   1/18: Patient sleepy but arousable, input of general surgery appreciated clarifying that there was no pneumatosis abdomen distention thought to be secondary to sepsis the patient is then for different reasons. Patient has history of congestive heart failure for which cardiology is following she is sleeping on BiPAP with no lower extremity edema. Patient is status post transplant on cyclosporine and whole blood was sent for assessing the level. PROBLEM LIST:    Active Problems:    CECILY (acute kidney injury) ([facility] [address]    Ischemic bowel disease ([facility] [address]  Resolved Problems:    * No resolved hospital problems.  * DIET:    Diet NPO Exceptions are: Ice Chips, Sips of Water with Meds       Allergies : Macrodantin [nitrofurantoin macrocrystal] and Sulfa antibiotics    Past Medical History:   Diagnosis Date    Abnormal EKG     left bundle branch block    Acute gout involving toe of right foot 9/3/2020    Acute gout involving toe of right foot 9/3/2020    Acute on chronic combined systolic (congestive) and diastolic (congestive) heart failure (HCC)     Cancer (HCC)     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    Cerebrovascular disease     Clotting disorder ([address] 1985    thrombophlebitis after c section delivery    Depression     15 years followed by dr Sonya Adams    Hyperlipidemia     Hypertension     Hyperthyroidism     Leg swelling 9/3/2020    Lymphedema of both lower extremities 9/3/2020    Peripheral vascular disease ([facility] [address]     Renal failure 2012    renal transplant    Thrombophlebitis     after c section delivery    Thyroid disease        Past Surgical History:   Procedure Laterality Date     SECTION  1985    one normal delivery    COLECTOMY N/A 1/15/2022    DIAGNOSTIC  LAPAROSCOPY LYSIS OF ADHESIONS performed by Starla Cullen MD at [address] [place] [address] Drive CATH LAB PROCEDURE      normal coronaries and  normal coronaries    DIALYSIS FISTULA CREATION  march and 2012    phase one and two patient will start dialysis when needed   [address]    transfemoral venous bypass grafts    KIDNEY TRANSPLANT  2016    KIDNEY TRANSPLANT  2015    KIDNEY TRANSPLANT  2015    PARATHYROIDECTOMY  2006    left parathyroid  implant and parathyroidectomy       Family History   Problem Relation Age of Onset    Diabetes Mother     Diabetes Father     Dementia Father         reports that she quit smoking about 6 years ago. She has a 20.00 pack-year smoking history.  She has never used smokeless tobacco. She reports that she does not drink alcohol and does not use drugs. Review of Systems:   Constitutional: no fevers , no chills , feels ok   Eyes: no eye pain , no itching , no drainage  Ears, nose, mouth, throat, and face: no ear ,nose pain , hearing is ok ,no nasal drainage   Respiratory: no sob ,no cough ,no wheezing . Cardiovascular: no chest pain , no palpitation ,no sob . Gastrointestinal: no nausea, vomiting , constipation , no abdominal pain . Genitourinary:no urinary retention , no burning , dysuria . No polyuria   Hematologic/lymphatic: no bleeding , no cougulation issues . Musculoskeletal:no joint pain , no swelling . Neurological: no headaches ,no weakness , no numbness . Endocrine: no thirst , no weight issues .      MEDS (scheduled):    ipratropium-albuterol  3 mL Inhalation 4x daily    pantoprazole  40 mg IntraVENous BID    And    sodium chloride (PF)  10 mL IntraVENous BID    fluconazole  400 mg IntraVENous Q24H    metoprolol tartrate  50 mg Oral BID    docusate  100 mg Oral BID    metroNIDAZOLE  500 mg IntraVENous Q8H    linezolid  600 mg IntraVENous Q12H    heparin (porcine)  5,000 Units SubCUTAneous BID    predniSONE  5 mg Oral Daily    heparin flush  1 mL IntraVENous 2 times per day    cefepime  2,000 mg IntraVENous Q24H    allopurinol  100 mg Oral Daily    budesonide  500 mcg Nebulization BID    desvenlafaxine succinate  50 mg Oral QPM    folic acid  1 mg Oral Daily    levothyroxine  50 mcg Oral Daily    atorvastatin  10 mg Oral Daily    ondansetron  4 mg Oral Once       MEDS (infusions):   sodium chloride 100 mL/hr at 01/18/22 0620    sodium chloride 25 mL (01/16/22 0035)       MEDS (prn):  ondansetron **OR** ondansetron, polyethylene glycol, acetaminophen **OR** acetaminophen, sodium chloride flush, sodium chloride, heparin flush    PHYSICAL EXAM:     Patient Vitals for the past 24 hrs:   BP Temp Temp src Pulse Resp SpO2   01/18/22 1017 -- -- -- -- 27 --   01/18/22 0846 122/65 98.3 °F (36.8 °C) Axillary 80 18 98 %   01/18/22 0625 -- -- -- -- 14 --   01/18/22 0606 115/64 98.2 °F (36.8 °C) Axillary 70 21 97 %   01/18/22 0224 -- -- -- -- 14 --   01/18/22 0130 (!) 121/59 97.9 °F (36.6 °C) Axillary 62 14 98 %   01/17/22 2123 -- -- -- -- 14 --   01/17/22 2015 (!) 109/58 98 °F (36.7 °C) Infrared 69 14 99 %   01/17/22 1415 -- -- -- -- 13 99 %   01/17/22 1215 131/65 98.1 °F (36.7 °C) Axillary 75 14 99 %   @      Intake/Output Summary (Last 24 hours) at 1/18/2022 1201  Last data filed at 1/18/2022 0930  Gross per 24 hour   Intake 400 ml   Output 1750 ml   Net -1350 ml         Wt Readings from Last 3 Encounters:   01/11/22 218 lb 12.8 oz (99.2 kg)   11/17/21 227 lb 3.2 oz (103.1 kg)   08/16/21 229 lb (103.9 kg)       Constitutional:  in no acute distress  Oral: mucus membranes moist  Neck: no JVD  Cardiovascular: S1, S2 regular rhythm, no murmur,or rub  Respiratory:  No crackles, no wheeze  Gastrointestinal:  Soft, nontender, nondistended, NABS  Ext: no edema, feet warm  Skin: dry, no rash  Neuro: awake, alert, interactive      DATA:    Recent Labs     01/17/22 1938   WBC 21.2*   HGB 10.5*   HCT 37.1   .1*        Recent Labs     01/17/22 1938      K 5.2*   *   CO2 23   MG 2.3   PHOS 4.4   BUN 72*   CREATININE 2.2*   ALT 12  13   AST 13  12   BILIDIR 0.3   BILITOT 0.4  0.4   ALKPHOS 74  76       No results found for: LABPROT    ASSESSMENT / RECOMMENDATIONS:      1. Acute kidney injury, felt to be related to volume depletion and  consists of diarrhea and poor p.o. intake. CECILY resolving with BUN today of 72 and creatinine down to 2.2.  2.  Chronic kidney disease, stage III. The patient with kidney  transplant. Baseline creatinine 1.5.  3. Immunosuppressant host, chronically on cyclosporin 100 mg b.i.d.,  CellCept 1000 mg b.i.d., and prednisone 5 mg. 4.  Sepsis. Need to rule out a C. diff. infection as the patient is  reporting diarrhea. 5.  Obesity.   6.  Hypotension.     PLAN:    Worsening clinical status   No intraabdominal pathology per general surgery   Cr worsening , if this trend continues we might think of holding off cyclosporine for few days hoping that creatinine levels are improving since cyclosporine is a severe vasoconstrictor. Patient still confused with change in mental status. Cyclosporine was not given for the last 3 days and currently on Zyvox. Check urine lytes   Re immunosuppression in general if we choose to hold an Immunosuppression we choose to hold MMF(anti metabolite ) rather cyclosporin (calciurine inhibitor ). Bmp in am   We will hold off cyclosporine and follow labs closely for the changes hoping to reverse CECILY.     Electronically signed by Elwanda Dubin, MD on 1/18/2022 at 12:01 PM

## 2022-01-18 NOTE — PROGRESS NOTES
GENERAL SURGERY  DAILY PROGRESS NOTE  1/18/2022    Chief Complaint   Patient presents with    Fatigue     patient attempted to go to pcp today and didnt have the strength. came here instead. patient states sepsis prior to thanksgiving requiring admission and rehab. Subjective:  Pt on BiPAP. Reports mild abdominal pain. Denies any nausea or vomiting.     Objective:  /65   Pulse 80   Temp 98.3 °F (36.8 °C) (Axillary)   Resp 27   Ht 5' 2\" (1.575 m)   Wt 218 lb 12.8 oz (99.2 kg)   SpO2 98%   BMI 40.02 kg/m²     GENERAL:  Laying in bed, awake, alert, cooperative, no apparent distress  HEAD: Normocephalic, atraumatic  EYES: No sclera icterus, pupils equal  LUNGS:  On BiPAP  CARDIOVASCULAR:  RR and normotensive  ABDOMEN:  Soft, incisions c/d/i, appropriately TTP around incisions, non-distended  EXTREMITIES: No edema or swelling  SKIN: Warm and dry    Assessment/Plan:  73 y.o. female with sepsis and reported pneumatosis of the colon on CT A/P s/p diagnostic laparoscopy 1/15 which was negative    - Okay for diet from surgical POV  - No signs of intra-abdominal causes of sepsis upon diagnostic laparoscopy  - Pneumatosis of the colon may be present but is benign as there was no necrotic or ischemic bowel  - No further plans for acute surgical intervention  - Please call with any questions    Electronically signed by Cecelia Perry MD on 1/18/2022 at 12:17 PM

## 2022-01-18 NOTE — CARE COORDINATION
1/18/22 1333 Continuous bipap FIO2 40%. Order to tx to ICU- awaiting a bed. Hx renal transplant 2015. Per ANN Gambino [facility] liaison, they can accept patient when medically stable to discharge. NO PRECERT NEEDED. WILL NEED UPDATED THERAPY NOTES, SIGNED GINA, RAPID COVID TEST ON DAY OF DISCHARGE, AND DISCHARGE ORDER FOR CM/SW TO COMPLETE HENS. Updated pts son, Trey Caraballo, on above.  Electronically signed by Fauzia Flores RN on 1/18/2022 at 1:36 PM

## 2022-01-18 NOTE — PROGRESS NOTES
Pulmonary/Critical Care Progress Note    We are following patient for acute respiratory failure with hypoxia and hypercapnia, hypoventilation, acute mental status change, pneumatosis of the colon, colitis, UTI, history of kidney transplant, home immunosuppression agent, acute on chronic renal failure, hypothyroidism, history of bipolar disorder, COPD. SUBJECTIVE:  Patient seen and examined in room. She is still very somnolent but arousable. MEDICATIONS:   ipratropium-albuterol  3 mL Inhalation 4x daily    pantoprazole  40 mg IntraVENous BID    And    sodium chloride (PF)  10 mL IntraVENous BID    fluconazole  400 mg IntraVENous Q24H    metoprolol tartrate  50 mg Oral BID    docusate  100 mg Oral BID    metroNIDAZOLE  500 mg IntraVENous Q8H    linezolid  600 mg IntraVENous Q12H    heparin (porcine)  5,000 Units SubCUTAneous BID    predniSONE  5 mg Oral Daily    heparin flush  1 mL IntraVENous 2 times per day    cefepime  2,000 mg IntraVENous Q24H    allopurinol  100 mg Oral Daily    budesonide  500 mcg Nebulization BID    desvenlafaxine succinate  50 mg Oral QPM    folic acid  1 mg Oral Daily    levothyroxine  50 mcg Oral Daily    atorvastatin  10 mg Oral Daily    ondansetron  4 mg Oral Once      sodium chloride 100 mL/hr at 01/18/22 0620    sodium chloride 25 mL (01/16/22 0035)     ondansetron **OR** ondansetron, polyethylene glycol, acetaminophen **OR** acetaminophen, sodium chloride flush, sodium chloride, heparin flush      REVIEW OF SYSTEMS:  Unable to obtain due to patient mental status. OBJECTIVE:  Vitals:    01/18/22 1017   BP:    Pulse:    Resp: 27   Temp:    SpO2:      FiO2 : 40 %  O2 Flow Rate (L/min): 5.5 L/min  O2 Device: PAP (positive airway pressure)    PHYSICAL EXAM:  Constitutional: Afebrile  Skin: Warm, dry, no rashes. HEENT: Atraumatic, normocephalic, PERRLA  Neck: Supple, no lymphadenopathy or thyromegaly. No carotid bruit.   Cardiovascular: Normal S1 and S2, no Date Value Ref Range Status   01/17/2022 23 22 - 29 mmol/L Final   01/15/2022 20 (L) 22 - 29 mmol/L Final   01/14/2022 21 (L) 22 - 29 mmol/L Final     BUN   Date Value Ref Range Status   01/17/2022 72 (H) 6 - 23 mg/dL Final   01/15/2022 36 (H) 6 - 23 mg/dL Final   01/14/2022 28 (H) 6 - 23 mg/dL Final     CREATININE   Date Value Ref Range Status   01/17/2022 2.2 (H) 0.5 - 1.0 mg/dL Final   01/15/2022 1.5 (H) 0.5 - 1.0 mg/dL Final   01/14/2022 1.4 (H) 0.5 - 1.0 mg/dL Final     Glucose   Date Value Ref Range Status   01/17/2022 119 (H) 74 - 99 mg/dL Final   01/15/2022 129 (H) 74 - 99 mg/dL Final   01/14/2022 114 (H) 74 - 99 mg/dL Final     Calcium   Date Value Ref Range Status   01/17/2022 10.1 8.6 - 10.2 mg/dL Final   01/15/2022 10.4 (H) 8.6 - 10.2 mg/dL Final   01/14/2022 10.5 (H) 8.6 - 10.2 mg/dL Final     Total Protein   Date Value Ref Range Status   01/17/2022 4.8 (L) 6.4 - 8.3 g/dL Final   01/17/2022 4.8 (L) 6.4 - 8.3 g/dL Final   01/15/2022 5.7 (L) 6.4 - 8.3 g/dL Final     Albumin   Date Value Ref Range Status   01/17/2022 2.3 (L) 3.5 - 5.2 g/dL Final   01/17/2022 2.3 (L) 3.5 - 5.2 g/dL Final   01/15/2022 2.6 (L) 3.5 - 5.2 g/dL Final     Total Bilirubin   Date Value Ref Range Status   01/17/2022 0.4 0.0 - 1.2 mg/dL Final   01/17/2022 0.4 0.0 - 1.2 mg/dL Final   01/15/2022 0.7 0.0 - 1.2 mg/dL Final     Alkaline Phosphatase   Date Value Ref Range Status   01/17/2022 76 35 - 104 U/L Final   01/17/2022 74 35 - 104 U/L Final   01/15/2022 106 (H) 35 - 104 U/L Final     AST   Date Value Ref Range Status   01/17/2022 12 0 - 31 U/L Final   01/17/2022 13 0 - 31 U/L Final   01/15/2022 18 0 - 31 U/L Final     ALT   Date Value Ref Range Status   01/17/2022 13 0 - 32 U/L Final   01/17/2022 12 0 - 32 U/L Final   01/15/2022 16 0 - 32 U/L Final     GFR Non-   Date Value Ref Range Status   01/17/2022 22 >=60 mL/min/1.73 Final     Comment:     Chronic Kidney Disease: less than 60 ml/min/1.73 sq.m.           Kidney Failure: less than 15 ml/min/1.73 sq.m. Results valid for patients 18 years and older. 01/15/2022 35 >=60 mL/min/1.73 Final     Comment:     Chronic Kidney Disease: less than 60 ml/min/1.73 sq.m. Kidney Failure: less than 15 ml/min/1.73 sq.m. Results valid for patients 18 years and older. 01/14/2022 38 >=60 mL/min/1.73 Final     Comment:     Chronic Kidney Disease: less than 60 ml/min/1.73 sq.m. Kidney Failure: less than 15 ml/min/1.73 sq.m. Results valid for patients 18 years and older. GFR    Date Value Ref Range Status   01/17/2022 27  Final   01/15/2022 42  Final   01/14/2022 46  Final     Magnesium   Date Value Ref Range Status   01/17/2022 2.3 1.6 - 2.6 mg/dL Final   01/15/2022 1.8 1.6 - 2.6 mg/dL Final   01/14/2022 1.7 1.6 - 2.6 mg/dL Final     Phosphorus   Date Value Ref Range Status   01/17/2022 4.4 2.5 - 4.5 mg/dL Final   01/15/2022 2.3 (L) 2.5 - 4.5 mg/dL Final   01/14/2022 3.2 2.5 - 4.5 mg/dL Final     Recent Labs     01/17/22  2258   PH 7.250*   PO2 75.8   PCO2 58.9*   HCO3 25.2   BE -2.7   O2SAT 94.4       RADIOLOGY:  CT ABDOMEN PELVIS WO CONTRAST Additional Contrast? None   Final Result   1. Colitis involving the left colon from splenic flexure to mid to distal   sigmoid colon. 2. Atrophy of native kidneys. Multiple renal cysts and nonobstructing left   renal calculus. Transplant kidney in right iliac fossa. 3. Trace ascites superficial to the liver. 4. Unchanged consolidation in lower lobes. 5. Unchanged cardiomegaly. 6. Possible gallbladder sludge and or cholelithiasis. RECOMMENDATIONS:   Unavailable         XR ABDOMEN (KUB) (SINGLE AP VIEW)   Final Result   No evidence of bowel obstruction. CT CHEST WO CONTRAST   Final Result   1. Bilateral lower lobe alveolar opacities compatible with pneumonia. Increased consolidation in the right lower lobe compared to the prior study.       2.  Trace effusions and trace pericardial effusion with left ventricular   dilatation. 3.  No evidence to suggest empyema. 4.  Gastric catheter passes into the stomach. XR CHEST PORTABLE   Final Result   Infiltrate and or atelectasis left greater than right with mildly improving   aeration in the left mid lung. MRI BRAIN WO CONTRAST   Final Result   No acute intracranial abnormality. XR ABDOMEN FOR NG/OG/NE TUBE PLACEMENT   Final Result   NG tube in good position. XR CHEST PORTABLE   Final Result   Cardiomegaly. Findings for an infiltrate with some consolidation in the mid lower aspect of   the left lung. CT ABDOMEN PELVIS WO CONTRAST Additional Contrast? Oral   Final Result   There is pneumatosis involving the ascending to transverse colon. There is   wall thickening of the splenic flexure to descending colon. This is   nonspecific but can be seen related to ischemic enteritis but there is no   evidence for portal venous gas,, medication induced pneumatosis in a patient   taking steroids or chemotherapy, procedure related. Clinical correlation   recommended. Surgical consultation may be needed. There is fairly dense bibasilar infiltrates which have progressed from prior   exam.  Correlation with fever and white count and follow-up to resolution   recommended. Multiple mixed density cysts both kidneys related to chronic renal failure   with the transplant right lower quadrant. There is distention of small bowel in the lower pelvis without obstructing   lesion identified. Small hiatal hernia. XR ABDOMEN (KUB) (SINGLE AP VIEW)   Final Result   Abundant gas and stool throughout the colon may represent constipation. XR CHEST PORTABLE   Final Result   1. Stable cardiomegaly. There is no evidence of failure or pneumonia. CT ABDOMEN PELVIS WO CONTRAST Additional Contrast? None   Final Result   No significant fluid within the large bowel.       No hydronephrosis of the transplant kidneys. The native kidneys are atrophic   and have multiple intermediate density cysts which should be further   evaluated with nonemergent ultrasound. Atherosclerotic disease. IR MIDLINE CATH    (Results Pending)   XR ABDOMEN (KUB) (SINGLE AP VIEW)    (Results Pending)           PROBLEM LIST:  Active Problems:    CECILY (acute kidney injury) ([address]    Ischemic bowel disease ([address]  Resolved Problems:    * No resolved hospital problems. *      IMPRESSION:  1. Acute respiratory failure with hypoxia and hypercapnia  2. Acute mental status change  3. Pneumatosis of colon, no evidence of bowel obstruction  4. Colitis  5. Bowel adhesion  6. UTI  7. History of kidney transplant, on immunosuppressant agents,  8. Acute on chronic renal failure   9. Hypertension  10. COPD  6. Bipolar disorder  12. Hypothyroidism. PLAN:  1. Patient becomes somnolent and is sleepy after she had exploratory laparoscopy on Saturday. She is still very somnolent this morning, slightly improved from yesterday, she is responding to verbal commands. CT abdomen/pelvis 1/17 showed colitis involving the left colon from splenic flexure to mid to the distal sigmoid colon, trace ascites, consolidation in lower lobes, unchanged cardiomegaly. Labs showed dehydration, acute on chronic kidney disease, normal ammonia. ABG showed improved PCO2. Currently on BiPAP 18/5, FiO2 40%, SPO2 97%. 2. Continue IV fluid. NG tube to low wall intermittent suction. 3. Patient already on broad-spectrum antibiotics cefepime/Zyvox/Diflucan/Flagyl per ID. 4. If patient condition deteriorates, she will likely need to be intubated. Case discussed with Dr. Mattie Celis.     ATTESTATION:  ICU Staff Physician note of personal involvement in Care  As the attending physician, I certify that I personally reviewed the patients history and personally examined the patient to confirm the physical findings described above,  And that I reviewed the relevant imaging studies and available reports. I also discussed the differential diagnosis and all of the proposed management plans with the patient and individuals accompanying the patient to this visit. They had the opportunity to ask questions about the proposed management plans and to have those questions answered. This patient has a high probability of sudden, clinically significant deterioration, which requires the highest level of physician preparedness to intervene urgently. I managed/supervised life or organ supporting interventions that required frequent physician assessment. I devoted my full attention to the direct care of this patient for the amount of time indicated below. Time I spent with the family or surrogate(s) is included only if the patient was incapable of providing the necessary information or participating in medical decisions - Time devoted to teaching and to any procedures I billed separately is not included.     CRITICAL CARE TIME:  30 mins    Electronically signed by KATINA Lazar CNP on 1/18/2022 at 11:04 AM

## 2022-01-18 NOTE — PROGRESS NOTES
Progress Note  Date:2022       Room:15/0515-02  Speedy Hinojosa     YOB: 1955     Age:66 y.o. Subjective    Subjective:  Symptoms:  Stable. (Pt very lethargic  , barely opens her eyes , or responds, in no distress, ON BIPAP). Diet:  Dietary issues: unable to eat due to being groggy , NG tube in place     Activity level: Impaired due to weakness. Pain:  She reports no pain. Review of Systems  Objective         Vitals Last 24 Hours:  TEMPERATURE:  Temp  Av.1 °F (36.7 °C)  Min: 97.9 °F (36.6 °C)  Max: 98.3 °F (36.8 °C)  RESPIRATIONS RANGE: Resp  Av.8  Min: 14  Max: 27  PULSE OXIMETRY RANGE: SpO2  Av %  Min: 97 %  Max: 99 %  PULSE RANGE: Pulse  Av.3  Min: 62  Max: 80  BLOOD PRESSURE RANGE: Systolic (89PVL), OEZ:843 , Min:109 , ZNP:493   ; Diastolic (94CUK), LQH:40, Min:58, Max:65    I/O (24Hr): Intake/Output Summary (Last 24 hours) at 2022 1716  Last data filed at 2022 0930  Gross per 24 hour   Intake 400 ml   Output 900 ml   Net -500 ml     Objective:  General Appearance: In no acute distress (drowzy and very lethargic  ,on bipap). Vital signs: (most recent): Blood pressure 122/65, pulse 80, temperature 98.3 °F (36.8 °C), temperature source Axillary, resp. rate 24, height 5' 2\" (1.575 m), weight 218 lb 12.8 oz (99.2 kg), SpO2 98 %. Vital signs are normal.    Output: Producing urine. HEENT: Normal HEENT exam.    Lungs: There are decreased breath sounds. Heart: Normal rate. Regular rhythm. Positive for murmur. Abdomen: Abdomen is soft and distended. (Obese ). Hypoactive bowel sounds. There is left upper quadrant and left lower quadrant tenderness. (Incisions from laparoscopy , glued and clean ). Extremities: (Edema trace  bilateral both LL and +1 both  UL)  Neurological: (Very drowzy ). Pupils:  Pupils are equal, round, and reactive to light.       Labs/Imaging/Diagnostics    Labs:  CBC:  Recent Labs 01/17/22 1938   WBC 21.2*   RBC 3.40*   HGB 10.5*   HCT 37.1   .1*   RDW 15.9*        CHEMISTRIES:  Recent Labs     01/17/22 1938      K 5.2*   *   CO2 23   BUN 72*   CREATININE 2.2*   GLUCOSE 119*   PHOS 4.4   MG 2.3     PT/INR:No results for input(s): PROTIME, INR in the last 72 hours. APTT:No results for input(s): APTT in the last 72 hours. LIVER PROFILE:  Recent Labs     01/17/22 1938   AST 13  12   ALT 12  13   BILIDIR 0.3   BILITOT 0.4  0.4   ALKPHOS 74  76       Imaging Last 24 Hours:  CT ABDOMEN PELVIS WO CONTRAST Additional Contrast? None    Result Date: 1/10/2022  EXAMINATION: CT OF THE ABDOMEN AND PELVIS WITHOUT CONTRAST 1/10/2022 6:56 pm TECHNIQUE: CT of the abdomen and pelvis was performed without the administration of intravenous contrast. Multiplanar reformatted images are provided for review. Dose modulation, iterative reconstruction, and/or weight based adjustment of the mA/kV was utilized to reduce the radiation dose to as low as reasonably achievable. COMPARISON: CT abdomen pelvis 11/17/2021 HISTORY: ORDERING SYSTEM PROVIDED HISTORY: evaluate for diarrhea TECHNOLOGIST PROVIDED HISTORY: Reason for exam:->evaluate for diarrhea Additional Contrast?->None Decision Support Exception - unselect if not a suspected or confirmed emergency medical condition->Emergency Medical Condition (MA) FINDINGS: Lower Chest: Atherosclerotic disease. Atrophic kidneys with multiple cysts. Some of the presumed cysts appear hyperdense. Subcentimeter foci in the kidneys are too small to accurately characterize. There is a transplant kidney in the right lower quadrant. There is no hydronephrosis of the transplant kidney. Decreased perinephric stranding above the transplant kidney. Unremarkable bladder. Unremarkable appearance of the reproductive organs. No abdominal aortic aneurysm. Atherosclerotic disease. No evidence of cholecystitis or pancreatitis.  No free air or significant free fluid. No evidence of bowel obstruction. Nondilated appendix. No significant fluid within the large bowel. Small fat containing umbilical hernia. Mild degenerative changes of the spine. Presumed surgical scars in the anterior abdominal wall. No significant fluid within the large bowel. No hydronephrosis of the transplant kidneys. The native kidneys are atrophic and have multiple intermediate density cysts which should be further evaluated with nonemergent ultrasound. Atherosclerotic disease. XR CHEST PORTABLE    Result Date: 1/11/2022  EXAMINATION: ONE XRAY VIEW OF THE CHEST 1/11/2022 9:11 am COMPARISON: None. HISTORY: ORDERING SYSTEM PROVIDED HISTORY: shortness of breath TECHNOLOGIST PROVIDED HISTORY: Reason for exam:->shortness of breath FINDINGS: The heart is enlarged. There are no findings of failure. The lungs are clear. There is no focal infiltrate or effusion. 1. Stable cardiomegaly. There is no evidence of failure or pneumonia. Assessment//Plan           Hospital Problems           Last Modified POA    CECILY (acute kidney injury) ([facility] [address] 1/11/2022 Yes    Ischemic bowel disease ([facility] [address] 1/15/2022 Yes        Assessment:    Condition: In stable condition. Improving. (Sepsis immunocompromised host ,   Still on Cefepime and vanc  X 1 dose afebrile now on zyvox , and flagyl , ct scan abdomen , inflamation in the descending and sigmoid colon   Urine enterococcus fecalis on zyvox ,   Blood culture negative  ,  resp film array negative   Bilateral LL pneumonia  On cefepime , but still  Very lethargic , ABG metaolic and resp acidosis , continue bipap   Ct scan of the abdomen ?  Enteritis , colonic distention no ischemic bowel ,pneumatosis , was taken by dr Pichardo Doing 1/15  possible ischemic bowel  Laparoscopy , but was clear .,   Ileus due to sepsis   Will start TF very small dose   Discussed with her son Mani Valladares on the phone ,   Called and updated [facility] intensivest and they still don't have bed and also discussed with AKANKSHA blanchard for the ICU at [facility];s and will try to transfer pt to ICU    Somnolence , and delirium ? Sepsis  Bilateral  pneumonia also on bipap , on cefepime     Leukocytosis secondary to above ,      Acute on chronic renal failure , renal function better with hydration , d/c IV fluids creatinine upon admission was 2.5 , better , labs today 2,2   S/p renal transplant in the past ,     HTN bp optimal  On metorpolol    Copd on aerosol treatment as needed     Bipolar controlled on  pristiq , hold clozaril    Hypothyroidism continue synthroid,   Prognosis poor         ).        Electronically signed by William Hernandez MD on 1/12/22 at 6:29 PM EST

## 2022-01-18 NOTE — PROGRESS NOTES
Call from family member, spoke with Mayur(son). In the event that he is not able to answer cell phone, Prem Monroyr (cousin) will be available. Jorge Carias was updated on patient status and plan of care. Electronically signed by Luis Riddle RN on 1/18/2022 at 12:28 PM

## 2022-01-19 ENCOUNTER — APPOINTMENT (OUTPATIENT)
Dept: GENERAL RADIOLOGY | Age: 67
DRG: 673 | End: 2022-01-19
Payer: MEDICARE

## 2022-01-19 LAB
AADO2: 136 MMHG
ACANTHOCYTES: ABNORMAL
ALBUMIN SERPL-MCNC: 2.3 G/DL (ref 3.5–5.2)
ALBUMIN SERPL-MCNC: 2.5 G/DL (ref 3.5–5.2)
ALP BLD-CCNC: 73 U/L (ref 35–104)
ALP BLD-CCNC: 77 U/L (ref 35–104)
ALT SERPL-CCNC: 10 U/L (ref 0–32)
ALT SERPL-CCNC: 12 U/L (ref 0–32)
AMMONIA: 44 UMOL/L (ref 11–51)
ANION GAP SERPL CALCULATED.3IONS-SCNC: 10 MMOL/L (ref 7–16)
ANION GAP SERPL CALCULATED.3IONS-SCNC: 13 MMOL/L (ref 7–16)
ANISOCYTOSIS: ABNORMAL
ANISOCYTOSIS: ABNORMAL
AST SERPL-CCNC: 10 U/L (ref 0–31)
AST SERPL-CCNC: 24 U/L (ref 0–31)
B.E.: -3.5 MMOL/L (ref -3–3)
BASOPHILS ABSOLUTE: 0 E9/L (ref 0–0.2)
BASOPHILS ABSOLUTE: 0 E9/L (ref 0–0.2)
BASOPHILS RELATIVE PERCENT: 0 % (ref 0–2)
BASOPHILS RELATIVE PERCENT: 0.3 % (ref 0–2)
BILIRUB SERPL-MCNC: 0.4 MG/DL (ref 0–1.2)
BILIRUB SERPL-MCNC: 0.5 MG/DL (ref 0–1.2)
BUN BLDV-MCNC: 72 MG/DL (ref 6–23)
BUN BLDV-MCNC: 77 MG/DL (ref 6–23)
CALCIUM SERPL-MCNC: 10 MG/DL (ref 8.6–10.2)
CALCIUM SERPL-MCNC: 10.7 MG/DL (ref 8.6–10.2)
CHLORIDE BLD-SCNC: 114 MMOL/L (ref 98–107)
CHLORIDE BLD-SCNC: 118 MMOL/L (ref 98–107)
CO2: 14 MMOL/L (ref 22–29)
CO2: 20 MMOL/L (ref 22–29)
COHB: 0.3 % (ref 0–1.5)
CREAT SERPL-MCNC: 1.7 MG/DL (ref 0.5–1)
CREAT SERPL-MCNC: 1.8 MG/DL (ref 0.5–1)
CRITICAL: ABNORMAL
DATE ANALYZED: ABNORMAL
DATE OF COLLECTION: ABNORMAL
EOSINOPHILS ABSOLUTE: 0 E9/L (ref 0.05–0.5)
EOSINOPHILS ABSOLUTE: 0.21 E9/L (ref 0.05–0.5)
EOSINOPHILS RELATIVE PERCENT: 0.1 % (ref 0–6)
EOSINOPHILS RELATIVE PERCENT: 1 % (ref 0–6)
FERRITIN: 1117 NG/ML
FIO2: 40 %
FOLATE: >20 NG/ML (ref 4.8–24.2)
GFR AFRICAN AMERICAN: 34
GFR AFRICAN AMERICAN: 36
GFR NON-AFRICAN AMERICAN: 28 ML/MIN/1.73
GFR NON-AFRICAN AMERICAN: 30 ML/MIN/1.73
GLUCOSE BLD-MCNC: 110 MG/DL (ref 74–99)
GLUCOSE BLD-MCNC: 132 MG/DL (ref 74–99)
HCO3: 22.3 MMOL/L (ref 22–26)
HCT VFR BLD CALC: 37.3 % (ref 34–48)
HCT VFR BLD CALC: 39.1 % (ref 34–48)
HCT VFR BLD CALC: 39.6 % (ref 34–48)
HEMOGLOBIN: 11.2 G/DL (ref 11.5–15.5)
HEMOGLOBIN: 11.6 G/DL (ref 11.5–15.5)
HHB: 3.7 % (ref 0–5)
IMMATURE RETIC FRACT: 16.3 % (ref 3–15.9)
IRON SATURATION: 51 % (ref 15–50)
IRON: 69 MCG/DL (ref 37–145)
LAB: ABNORMAL
LACTIC ACID: 0.9 MMOL/L (ref 0.5–2.2)
LV EF: 63 %
LVEF MODALITY: NORMAL
LYMPHOCYTES ABSOLUTE: 0.21 E9/L (ref 1.5–4)
LYMPHOCYTES ABSOLUTE: 0.43 E9/L (ref 1.5–4)
LYMPHOCYTES RELATIVE PERCENT: 0.9 % (ref 20–42)
LYMPHOCYTES RELATIVE PERCENT: 2 % (ref 20–42)
Lab: ABNORMAL
MAGNESIUM: 2.2 MG/DL (ref 1.6–2.6)
MAGNESIUM: 2.3 MG/DL (ref 1.6–2.6)
MCH RBC QN AUTO: 30.5 PG (ref 26–35)
MCH RBC QN AUTO: 30.7 PG (ref 26–35)
MCHC RBC AUTO-ENTMCNC: 28.6 % (ref 32–34.5)
MCHC RBC AUTO-ENTMCNC: 29.3 % (ref 32–34.5)
MCV RBC AUTO: 104.8 FL (ref 80–99.9)
MCV RBC AUTO: 106.5 FL (ref 80–99.9)
METAMYELOCYTES RELATIVE PERCENT: 1 % (ref 0–1)
METHB: 0.2 % (ref 0–1.5)
MODE: ABNORMAL
MONOCYTES ABSOLUTE: 0.2 E9/L (ref 0.1–0.95)
MONOCYTES ABSOLUTE: 1.28 E9/L (ref 0.1–0.95)
MONOCYTES RELATIVE PERCENT: 0.9 % (ref 2–12)
MONOCYTES RELATIVE PERCENT: 6 % (ref 2–12)
MYELOCYTE PERCENT: 1 % (ref 0–0)
NEUTROPHILS ABSOLUTE: 19.47 E9/L (ref 1.8–7.3)
NEUTROPHILS ABSOLUTE: 20.09 E9/L (ref 1.8–7.3)
NEUTROPHILS RELATIVE PERCENT: 89 % (ref 43–80)
NEUTROPHILS RELATIVE PERCENT: 98.3 % (ref 43–80)
O2 CONTENT: 16.5 ML/DL
O2 SATURATION: 96.3 % (ref 92–98.5)
O2HB: 95.8 % (ref 94–97)
OPERATOR ID: 2323
OVALOCYTES: ABNORMAL
PATIENT TEMP: 37 C
PCO2: 42.9 MMHG (ref 35–45)
PDW BLD-RTO: 15.6 FL (ref 11.5–15)
PDW BLD-RTO: 15.7 FL (ref 11.5–15)
PEEP/CPAP: 5 CMH2O
PFO2: 2.25 MMHG/%
PH BLOOD GAS: 7.33 (ref 7.35–7.45)
PHOSPHORUS: 3 MG/DL (ref 2.5–4.5)
PHOSPHORUS: 3.1 MG/DL (ref 2.5–4.5)
PLATELET # BLD: 154 E9/L (ref 130–450)
PLATELET # BLD: 96 E9/L (ref 130–450)
PLATELET CONFIRMATION: NORMAL
PMV BLD AUTO: 12.7 FL (ref 7–12)
PMV BLD AUTO: 13 FL (ref 7–12)
PO2: 89.9 MMHG (ref 75–100)
POIKILOCYTES: ABNORMAL
POLYCHROMASIA: ABNORMAL
POTASSIUM SERPL-SCNC: 5.2 MMOL/L (ref 3.5–5)
POTASSIUM SERPL-SCNC: 5.3 MMOL/L (ref 3.5–5)
POTASSIUM SERPL-SCNC: 8.5 MMOL/L (ref 3.5–5)
PS: 20 CMH20
RBC # BLD: 3.67 E12/L (ref 3.5–5.5)
RBC # BLD: 3.78 E12/L (ref 3.5–5.5)
RETIC HGB EQUIVALENT: 30 PG (ref 28.2–36.6)
RETICULOCYTE ABSOLUTE COUNT: 0.04 E12/L
RETICULOCYTE COUNT PCT: 1.1 % (ref 0.4–1.9)
RI(T): 1.51
SCHISTOCYTES: ABNORMAL
SODIUM BLD-SCNC: 144 MMOL/L (ref 132–146)
SODIUM BLD-SCNC: 145 MMOL/L (ref 132–146)
SOURCE, BLOOD GAS: ABNORMAL
TARGET CELLS: ABNORMAL
TEAR DROP CELLS: ABNORMAL
THB: 12.2 G/DL (ref 11.5–16.5)
TIME ANALYZED: 130
TOTAL IRON BINDING CAPACITY: 135 MCG/DL (ref 250–450)
TOTAL PROTEIN: 4.9 G/DL (ref 6.4–8.3)
TOTAL PROTEIN: 5.1 G/DL (ref 6.4–8.3)
TOXIC GRANULATION: ABNORMAL
VACUOLATED NEUTROPHILS: ABNORMAL
VITAMIN B-12: >2000 PG/ML (ref 211–946)
WBC # BLD: 20.5 E9/L (ref 4.5–11.5)
WBC # BLD: 21.4 E9/L (ref 4.5–11.5)

## 2022-01-19 PROCEDURE — 6360000002 HC RX W HCPCS: Performed by: SURGERY

## 2022-01-19 PROCEDURE — 94660 CPAP INITIATION&MGMT: CPT

## 2022-01-19 PROCEDURE — A4216 STERILE WATER/SALINE, 10 ML: HCPCS | Performed by: HOSPITALIST

## 2022-01-19 PROCEDURE — 2500000003 HC RX 250 WO HCPCS: Performed by: INTERNAL MEDICINE

## 2022-01-19 PROCEDURE — 84132 ASSAY OF SERUM POTASSIUM: CPT

## 2022-01-19 PROCEDURE — 36569 INSJ PICC 5 YR+ W/O IMAGING: CPT

## 2022-01-19 PROCEDURE — 76937 US GUIDE VASCULAR ACCESS: CPT

## 2022-01-19 PROCEDURE — 86695 HERPES SIMPLEX TYPE 1 TEST: CPT

## 2022-01-19 PROCEDURE — 82746 ASSAY OF FOLIC ACID SERUM: CPT

## 2022-01-19 PROCEDURE — 83550 IRON BINDING TEST: CPT

## 2022-01-19 PROCEDURE — 2500000003 HC RX 250 WO HCPCS: Performed by: SURGERY

## 2022-01-19 PROCEDURE — 2720000010 HC SURG SUPPLY STERILE

## 2022-01-19 PROCEDURE — 2580000003 HC RX 258: Performed by: HOSPITALIST

## 2022-01-19 PROCEDURE — 6370000000 HC RX 637 (ALT 250 FOR IP): Performed by: INTERNAL MEDICINE

## 2022-01-19 PROCEDURE — 83605 ASSAY OF LACTIC ACID: CPT

## 2022-01-19 PROCEDURE — 83735 ASSAY OF MAGNESIUM: CPT

## 2022-01-19 PROCEDURE — 82805 BLOOD GASES W/O2 SATURATION: CPT

## 2022-01-19 PROCEDURE — 80053 COMPREHEN METABOLIC PANEL: CPT

## 2022-01-19 PROCEDURE — 2580000003 HC RX 258: Performed by: INTERNAL MEDICINE

## 2022-01-19 PROCEDURE — 83540 ASSAY OF IRON: CPT

## 2022-01-19 PROCEDURE — 36415 COLL VENOUS BLD VENIPUNCTURE: CPT

## 2022-01-19 PROCEDURE — 6360000002 HC RX W HCPCS: Performed by: INTERNAL MEDICINE

## 2022-01-19 PROCEDURE — 6370000000 HC RX 637 (ALT 250 FOR IP): Performed by: SURGERY

## 2022-01-19 PROCEDURE — 6360000002 HC RX W HCPCS: Performed by: SPECIALIST

## 2022-01-19 PROCEDURE — C9113 INJ PANTOPRAZOLE SODIUM, VIA: HCPCS | Performed by: HOSPITALIST

## 2022-01-19 PROCEDURE — 85045 AUTOMATED RETICULOCYTE COUNT: CPT

## 2022-01-19 PROCEDURE — A4216 STERILE WATER/SALINE, 10 ML: HCPCS | Performed by: SPECIALIST

## 2022-01-19 PROCEDURE — C1751 CATH, INF, PER/CENT/MIDLINE: HCPCS

## 2022-01-19 PROCEDURE — 93308 TTE F-UP OR LMTD: CPT

## 2022-01-19 PROCEDURE — 94640 AIRWAY INHALATION TREATMENT: CPT

## 2022-01-19 PROCEDURE — 84100 ASSAY OF PHOSPHORUS: CPT

## 2022-01-19 PROCEDURE — 85025 COMPLETE CBC W/AUTO DIFF WBC: CPT

## 2022-01-19 PROCEDURE — 86694 HERPES SIMPLEX NES ANTBDY: CPT

## 2022-01-19 PROCEDURE — 2060000000 HC ICU INTERMEDIATE R&B

## 2022-01-19 PROCEDURE — 82140 ASSAY OF AMMONIA: CPT

## 2022-01-19 PROCEDURE — 86696 HERPES SIMPLEX TYPE 2 TEST: CPT

## 2022-01-19 PROCEDURE — 82728 ASSAY OF FERRITIN: CPT

## 2022-01-19 PROCEDURE — 2580000003 HC RX 258: Performed by: SPECIALIST

## 2022-01-19 PROCEDURE — 2700000000 HC OXYGEN THERAPY PER DAY

## 2022-01-19 PROCEDURE — 82607 VITAMIN B-12: CPT

## 2022-01-19 PROCEDURE — 6360000002 HC RX W HCPCS: Performed by: HOSPITALIST

## 2022-01-19 RX ORDER — LIDOCAINE HYDROCHLORIDE 10 MG/ML
5 INJECTION, SOLUTION EPIDURAL; INFILTRATION; INTRACAUDAL; PERINEURAL ONCE
Status: DISCONTINUED | OUTPATIENT
Start: 2022-01-19 | End: 2022-01-25 | Stop reason: HOSPADM

## 2022-01-19 RX ORDER — SODIUM CHLORIDE 0.9 % (FLUSH) 0.9 %
5-40 SYRINGE (ML) INJECTION EVERY 12 HOURS SCHEDULED
Status: DISCONTINUED | OUTPATIENT
Start: 2022-01-19 | End: 2022-01-25 | Stop reason: HOSPADM

## 2022-01-19 RX ORDER — HEPARIN SODIUM (PORCINE) LOCK FLUSH IV SOLN 100 UNIT/ML 100 UNIT/ML
3 SOLUTION INTRAVENOUS EVERY 12 HOURS SCHEDULED
Status: DISCONTINUED | OUTPATIENT
Start: 2022-01-19 | End: 2022-01-25 | Stop reason: HOSPADM

## 2022-01-19 RX ORDER — HEPARIN SODIUM (PORCINE) LOCK FLUSH IV SOLN 100 UNIT/ML 100 UNIT/ML
3 SOLUTION INTRAVENOUS PRN
Status: DISCONTINUED | OUTPATIENT
Start: 2022-01-19 | End: 2022-01-25 | Stop reason: HOSPADM

## 2022-01-19 RX ORDER — SODIUM CHLORIDE 0.9 % (FLUSH) 0.9 %
5-40 SYRINGE (ML) INJECTION PRN
Status: DISCONTINUED | OUTPATIENT
Start: 2022-01-19 | End: 2022-01-25 | Stop reason: HOSPADM

## 2022-01-19 RX ORDER — SODIUM CHLORIDE 9 MG/ML
25 INJECTION, SOLUTION INTRAVENOUS PRN
Status: DISCONTINUED | OUTPATIENT
Start: 2022-01-19 | End: 2022-01-25 | Stop reason: HOSPADM

## 2022-01-19 RX ADMIN — Medication 10 ML: at 23:02

## 2022-01-19 RX ADMIN — IPRATROPIUM BROMIDE AND ALBUTEROL SULFATE 3 ML: .5; 3 SOLUTION RESPIRATORY (INHALATION) at 13:40

## 2022-01-19 RX ADMIN — SODIUM CHLORIDE, PRESERVATIVE FREE 10 ML: 5 INJECTION INTRAVENOUS at 11:28

## 2022-01-19 RX ADMIN — HEPARIN SODIUM 5000 UNITS: 5000 INJECTION INTRAVENOUS; SUBCUTANEOUS at 22:45

## 2022-01-19 RX ADMIN — FOLIC ACID 1 MG: 1 TABLET ORAL at 09:23

## 2022-01-19 RX ADMIN — DAPTOMYCIN 550 MG: 500 INJECTION, POWDER, LYOPHILIZED, FOR SOLUTION INTRAVENOUS at 15:37

## 2022-01-19 RX ADMIN — ACETAMINOPHEN 650 MG: 325 TABLET ORAL at 09:23

## 2022-01-19 RX ADMIN — DOCUSATE SODIUM 100 MG: 50 LIQUID ORAL at 09:23

## 2022-01-19 RX ADMIN — LINEZOLID 600 MG: 600 INJECTION, SOLUTION INTRAVENOUS at 06:45

## 2022-01-19 RX ADMIN — BUDESONIDE 500 MCG: 0.5 INHALANT RESPIRATORY (INHALATION) at 19:30

## 2022-01-19 RX ADMIN — HEPARIN 100 UNITS: 100 SYRINGE at 15:54

## 2022-01-19 RX ADMIN — PANTOPRAZOLE SODIUM 40 MG: 40 INJECTION, POWDER, FOR SOLUTION INTRAVENOUS at 22:45

## 2022-01-19 RX ADMIN — HEPARIN SODIUM 5000 UNITS: 5000 INJECTION INTRAVENOUS; SUBCUTANEOUS at 09:32

## 2022-01-19 RX ADMIN — PANTOPRAZOLE SODIUM 40 MG: 40 INJECTION, POWDER, FOR SOLUTION INTRAVENOUS at 09:35

## 2022-01-19 RX ADMIN — FLUCONAZOLE 400 MG: 2 INJECTION INTRAVENOUS at 17:51

## 2022-01-19 RX ADMIN — ALLOPURINOL 100 MG: 100 TABLET ORAL at 09:23

## 2022-01-19 RX ADMIN — HEPARIN 300 UNITS: 100 SYRINGE at 23:20

## 2022-01-19 RX ADMIN — LEVOTHYROXINE SODIUM 50 MCG: 0.05 TABLET ORAL at 09:23

## 2022-01-19 RX ADMIN — IPRATROPIUM BROMIDE AND ALBUTEROL SULFATE 3 ML: .5; 3 SOLUTION RESPIRATORY (INHALATION) at 19:30

## 2022-01-19 RX ADMIN — DOCUSATE SODIUM 100 MG: 50 LIQUID ORAL at 22:45

## 2022-01-19 RX ADMIN — IPRATROPIUM BROMIDE AND ALBUTEROL SULFATE 3 ML: .5; 3 SOLUTION RESPIRATORY (INHALATION) at 06:43

## 2022-01-19 RX ADMIN — PREDNISONE 5 MG: 5 TABLET ORAL at 09:23

## 2022-01-19 RX ADMIN — METOPROLOL TARTRATE 50 MG: 50 TABLET, FILM COATED ORAL at 09:22

## 2022-01-19 RX ADMIN — METOPROLOL TARTRATE 50 MG: 50 TABLET, FILM COATED ORAL at 22:45

## 2022-01-19 RX ADMIN — DESVENLAFAXINE SUCCINATE 50 MG: 50 TABLET, FILM COATED, EXTENDED RELEASE ORAL at 18:10

## 2022-01-19 RX ADMIN — BUDESONIDE 500 MCG: 0.5 INHALANT RESPIRATORY (INHALATION) at 06:43

## 2022-01-19 RX ADMIN — PIPERACILLIN SODIUM AND TAZOBACTAM SODIUM 3375 MG: 3; .375 INJECTION, POWDER, LYOPHILIZED, FOR SOLUTION INTRAVENOUS at 23:03

## 2022-01-19 RX ADMIN — SODIUM BICARBONATE: 84 INJECTION, SOLUTION INTRAVENOUS at 17:52

## 2022-01-19 RX ADMIN — METRONIDAZOLE 500 MG: 500 INJECTION, SOLUTION INTRAVENOUS at 05:26

## 2022-01-19 RX ADMIN — HEPARIN 100 UNITS: 100 SYRINGE at 22:46

## 2022-01-19 RX ADMIN — ATORVASTATIN CALCIUM 10 MG: 10 TABLET, FILM COATED ORAL at 09:23

## 2022-01-19 RX ADMIN — IPRATROPIUM BROMIDE AND ALBUTEROL SULFATE 3 ML: .5; 3 SOLUTION RESPIRATORY (INHALATION) at 10:14

## 2022-01-19 ASSESSMENT — PAIN SCALES - GENERAL
PAINLEVEL_OUTOF10: 0
PAINLEVEL_OUTOF10: 0

## 2022-01-19 NOTE — CARE COORDINATION
1/19/22 1546 CM note: COVID (-) 1/11/22. Continuous bipap FIO2 40%. Spoke with pts son, Kalpana Owens,  regarding benefits of LTAC and LTAC facilities in case LTAC is appropriate at discharge. Mayur choiced for: 1) [facility]- referral given to Sharon and she is reviewing  2) [facility]- referral given to Bahman Reardon and she is reviewing. Prior discharge plan was SNF at [address] [place] [address] Koko Per Maki [facility] Marj Sutherland liaison, they can accept patient when medically stable to discharge. NO PRECERT NEEDED. WILL NEED UPDATED THERAPY NOTES, SIGNED GINA, RAPID COVID TEST ON DAY OF DISCHARGE, AND DISCHARGE ORDER FOR CM/SW TO COMPLETE HENS. PICC line ordered. Pt currently on multiple IV ATB. NG in place for gastrin decompressing.  Electronically signed by Cyril Brown RN on 1/19/2022 at 3:51 PM

## 2022-01-19 NOTE — PROGRESS NOTES
SL PICC Placement 1/19/2022    Product number: [id number]   Lot Number: [id number]      Ultrasound: yes   R Brachial      Upper Arm Circumference: 38cm    Size: 4.5cm    Exposed Length: 2cm    Internal Length: 41cm   Cut: 12cm   Vein Measurement: 0.5cm    Sanju Johnson RN  1/19/2022  7:36 PM   No complications, pt tolerated well, TAHMINA Kaba Poster notified

## 2022-01-19 NOTE — PROGRESS NOTES
GENERAL SURGERY  DAILY PROGRESS NOTE  1/19/2022    Chief Complaint   Patient presents with    Fatigue     patient attempted to go to pcp today and didnt have the strength. came here instead. patient states sepsis prior to thanksgiving requiring admission and rehab. Subjective:  Pt on BiPAP and lethargic    Objective:  BP (!) 122/57   Pulse 80   Temp 98.6 °F (37 °C) (Axillary)   Resp 18   Ht 5' 2\" (1.575 m)   Wt 218 lb 12.8 oz (99.2 kg)   SpO2 97%   BMI 40.02 kg/m²     GENERAL:  Laying in bed, lethargic, no apparent distress  HEAD: Normocephalic, atraumatic  EYES: No sclera icterus, pupils equal  LUNGS:  On BiPAP  CARDIOVASCULAR:  RR and normotensive  ABDOMEN:  Soft, incisions c/d/i, appropriately TTP around incisions, non-distended  EXTREMITIES: No edema or swelling  SKIN: Warm and dry    Assessment/Plan:  73 y.o. female with sepsis and reported pneumatosis of the colon on CT A/P s/p diagnostic laparoscopy 1/15 which was negative    - Okay for diet from surgical POV  - No signs of intra-abdominal causes of sepsis upon diagnostic laparoscopy  - Pneumatosis of the colon may be present but is benign as there was no necrotic or ischemic bowel  - No further plans for acute surgical intervention    Electronically signed by Sade Estrella MD on 1/19/2022 at 7:27 AM      Colitis on repeat CT, will monitor exam and clinical progress, cont  abx.

## 2022-01-19 NOTE — PROGRESS NOTES
PROGRESS NOTE    By Echo See D.O GI Fellow    [facility]  Dr. Alba Corcoran MD, Dr. Esther Cast MD, Dr Rafael Ocampo, Dr. Garth Cunningham MD, Dr. Zambrano, DO Ronni Dakins  73 y.o.  female    SUBJECTIVE: Patient was seen and examined at bedside. However patient remained on BiPAP. Encephalopathic. Patient only responding to pain stimuli. Review of this morning showed that patient hemoglobin is 11.6. .8. Patient has no signs of overt GI bleeding.     OBJECTIVE:    /62   Pulse 87   Temp 98.6 °F (37 °C) (Axillary)   Resp 17   Ht 5' 2\" (1.575 m)   Wt 218 lb 12.8 oz (99.2 kg)   SpO2 94%   BMI 40.02 kg/m²     Gen: NAD, AAO x 3  HEENT:PEERL, no icterus  Heart: RRR, no M/R/G  Lungs: CTAB  Abd.: soft, NT, ND, BS +, no G/R, no HSM  Extr.: no C/C/E, no bruising         Lab Results   Component Value Date    WBC 21.4 01/19/2022    WBC 20.5 01/19/2022    WBC 21.2 01/17/2022    HGB 11.6 01/19/2022    HGB 11.2 01/19/2022    HGB 10.5 01/17/2022    HCT 39.6 01/19/2022    .8 01/19/2022    RDW 15.7 01/19/2022    PLT 96 01/19/2022     01/19/2022     01/17/2022     Lab Results   Component Value Date     01/19/2022    K 5.3 01/19/2022    K 5.5 01/10/2022     01/19/2022    CO2 14 01/19/2022    BUN 77 01/19/2022    CREATININE 1.7 01/19/2022    CALCIUM 10.7 01/19/2022    PROT 5.1 01/19/2022    LABALBU 2.3 01/19/2022    BILITOT 0.4 01/19/2022    BILITOT 0.5 01/19/2022    BILITOT 0.4 01/17/2022    BILITOT 0.4 01/17/2022    ALKPHOS 77 01/19/2022    ALKPHOS 73 01/19/2022    ALKPHOS 76 01/17/2022    ALKPHOS 74 01/17/2022    AST 24 01/19/2022    AST 10 01/19/2022    AST 12 01/17/2022    AST 13 01/17/2022    ALT 12 01/19/2022    ALT 10 01/19/2022    ALT 13 01/17/2022    ALT 12 01/17/2022     Lab Results   Component Value Date    LIPASE 19 11/17/2021     No results found for: AMYLASE      ASSESSMENT/PLAN:    1.  Abdominal distention patient is status post recent Diagnostic laparoscopy  -Possibly secondary to body habitus/adynamic ileus secondary to acute illness/recent laparoscopic procedure -cannot exclude Tam's syndrome.  - No need for emergent endoscopy evaluation at this time. -General surgery on case -input appreciated  -KUB as ordered has not been performed  -Okay for liquid diet from GI POV     2. Chronic macrocytic anemia most likely due to hypothyroidism. Cannot rule out medication/bone marrow suppression.  - Patient does have history of hypothyroidism currently also taking mycophenolate  -H&H at baseline without evidence of overt bleed  -Iron study are within normal limits B12 studies within normal limit.  -Monitor H&H  -Continue PPI     3. Sepsis  -Per admitting     4.  Respiratory failure  -Patient requiring noninvasive positive pressure ventilation  -Per admitting/pulmonology     5. history of renal transplant  -Per admitting/nephrology     6.  Multiple comorbidities -COPD, HTN, HLD, BPD, COPD, CHF, hypothyroidism, etc.  -Per admitting/pertinent consultants      Pt was discussed with Dr. Gasper Garland DO  GI Fellow   1/19/2022  10:27 AM    Patient seen and independently examined. Pertinent notes and lab work reviewed. Monitor reviewed showing sinus rhythm. Discussed with Dr. Melany Orozco with physical exam and A&P.     Deja Hutchison MD  1/19/2022  1:26 PM

## 2022-01-19 NOTE — PROGRESS NOTES
Obtained order and consent for PICC line, meño served Dr Radha Bustillo and received clearance per PICC team

## 2022-01-19 NOTE — PROGRESS NOTES
[facility]    [address] [place] [address] 80  [place], [address]  Phone [phone]   [phone]      Admit Date: 1/10/2022  6:12 PM  Pt Name: Rohan Yang  MRN: [id number]  : 1955  Reason for Consult:    Chief Complaint   Patient presents with    Fatigue     patient attempted to go to pcp today and didnt have the strength. came here instead. patient states sepsis prior to thanksgiving requiring admission and rehab. Requesting Physician:  Bautista Issa MD  PCP: Bautista Issa MD  History Obtained From:  patient, chart   ID consulted for CECILY (acute kidney injury) ([address] [N17.9]  on hospital day [address]       Chief Complaint   Patient presents with    Fatigue     patient attempted to go to pcp today and didnt have the strength. came here instead. patient states sepsis prior to thanksgiving requiring admission and rehab. HISTORYOF PRESENT ILLNESS   Rohan Yang is a 73 y.o. female who presents with   has a past medical history of Abnormal EKG, Acute gout involving toe of right foot, Acute gout involving toe of right foot, Acute on chronic combined systolic (congestive) and diastolic (congestive) heart failure ([address] Cancer ([address] Cerebrovascular disease, Clotting disorder ([address] Depression, Hyperlipidemia, Hypertension, Hyperthyroidism, Leg swelling, Lymphedema of both lower extremities, Peripheral vascular disease ([address] Renal failure, Thrombophlebitis, and Thyroid disease. Fatigue  Associated symptoms include fatigue.      PT FOLLOWED UP WITH ID ON  S/P ATBX  STARTED ON FLUCONAZOLE/MYCOSTSATIN POWDER FOR INTERTRIGO   PT COMES IN NOW WITH FATIGUE DEC APPETITE NAUSEA   TMAX99.4 ON RA  CR2.5 WBC17.9  COVID NEG   BLOOD CX PENDING   ATBX was initiated   Not interactive  denies f/c  Just fatigue    Dos  22   Afebrile bipap40%  Cr1.7 wbc21.4 plt96  Spoke with Dr Suresh Soni    2022  bipap still lethargic  ngt  11/7/2022  BIPAP LETHARGIC DOES RESPONDS HAS NGT  1/16/2022   ptis in bed supine 6L afebrile   Barely wakes up   Wbc20.2 cr1.5    Urine cx E faecalis   Blood cx ngtd   S/p PROCEDURE:  Diagnostic laparoscopy with lysis of adhesions. No signs of ischemia. Intra-abdominal  adhesions. Minimal intra-abdominal fluid. No other signs of  intra-abdominal pathology.     REVIEW OF SYSTEMS     CONSTITUTIONAL:   as in hpi     CURRENT MEDICATIONS     Current Facility-Administered Medications:     morphine (PF) injection 2 mg, 2 mg, IntraVENous, Q4H PRN, Paula Feng MD    ipratropium-albuterol (DUONEB) nebulizer solution 3 mL, 3 mL, Inhalation, 4x daily, Giovana Villavicencio MD, 3 mL at 01/19/22 0643    pantoprazole (PROTONIX) injection 40 mg, 40 mg, IntraVENous, BID, 40 mg at 01/18/22 2242 **AND** sodium chloride (PF) 0.9 % injection 10 mL, 10 mL, IntraVENous, BID, Gaby Rojas MD, 10 mL at 01/18/22 2334    fluconazole (DIFLUCAN) in 0.9 % sodium chloride IVPB 400 mg, 400 mg, IntraVENous, Q24H, Olivier Waite MD, Last Rate: 100 mL/hr at 01/18/22 1524, 400 mg at 01/18/22 1524    0.9 % sodium chloride infusion, , IntraVENous, Continuous, Leona Peralta MD, Last Rate: 100 mL/hr at 01/18/22 0620, New Bag at 01/18/22 0620    metoprolol tartrate (LOPRESSOR) tablet 50 mg, 50 mg, Oral, BID, Susannah Ramirez MD, 50 mg at 01/18/22 2241    docusate (COLACE) 50 MG/5ML liquid 100 mg, 100 mg, Oral, BID, Susannah Ramirez MD, 100 mg at 01/18/22 2334    metronidazole (FLAGYL) 500 mg in NaCl 100 mL IVPB premix, 500 mg, IntraVENous, Q8H, Paula Feng MD, Stopped at 01/19/22 0649    linezolid (ZYVOX) IVPB 600 mg, 600 mg, IntraVENous, Q12H, Paula Feng MD, Last Rate: 300 mL/hr at 01/19/22 0645, 600 mg at 01/19/22 0645    heparin (porcine) injection 5,000 Units, 5,000 Units, SubCUTAneous, BID, Paula Feng MD, 5,000 Units at 01/18/22 7811    ondansetron (ZOFRAN-ODT) disintegrating tablet 4 mg, 4 mg, Oral, Q8H PRN, 4 mg at 01/11/22 1806 **OR** ondansetron (ZOFRAN) injection 4 mg, 4 mg, IntraVENous, Q6H PRN, Paula Feng MD    polyethylene glycol [facility]) packet 17 g, 17 g, Oral, Daily PRN, Paula Feng MD, 17 g at 01/13/22 1611    acetaminophen (TYLENOL) tablet 650 mg, 650 mg, Oral, Q6H PRN, 650 mg at 01/11/22 1819 **OR** acetaminophen (TYLENOL) suppository 650 mg, 650 mg, Rectal, Q6H PRN, Paula Feng MD    predniSONE (DELTASONE) tablet 5 mg, 5 mg, Oral, Daily, Paula Feng MD, 5 mg at 01/18/22 1109    sodium chloride flush 0.9 % injection 5-40 mL, 5-40 mL, IntraVENous, PRN, Paula Feng MD, 10 mL at 01/17/22 1601    0.9 % sodium chloride infusion, 25 mL, IntraVENous, PRN, Paula Feng MD, Last Rate: 100 mL/hr at 01/16/22 0035, 25 mL at 01/16/22 0035    heparin flush 100 UNIT/ML injection 100 Units, 1 mL, IntraVENous, 2 times per day, Paula Feng MD, 100 Units at 01/18/22 2242    heparin flush 100 UNIT/ML injection 100 Units, 1 mL, IntraCATHeter, PRN, Paula Feng MD    cefepime (MAXIPIME) 2000 mg IVPB extended (mini-bag), 2,000 mg, IntraVENous, Q24H, Paula Feng MD, Stopped at 01/18/22 1944    allopurinol (ZYLOPRIM) tablet 100 mg, 100 mg, Oral, Daily, Paula Feng MD, 100 mg at 01/18/22 1109    budesonide (PULMICORT) nebulizer suspension 500 mcg, 500 mcg, Nebulization, BID, Paula Feng MD, 500 mcg at 01/19/22 8034    desvenlafaxine succinate (PRISTIQ) extended release tablet 50 mg, 50 mg, Oral, QPM, Paula Feng MD, 50 mg at 21/38/99 5798    folic acid (FOLVITE) tablet 1 mg, 1 mg, Oral, Daily, Paula Feng MD, 1 mg at 01/18/22 1109    levothyroxine (SYNTHROID) tablet 50 mcg, 50 mcg, Oral, Daily, Paula Feng MD, 50 mcg at 01/18/22 1109    atorvastatin (LIPITOR) tablet 10 mg, 10 mg, Oral, Daily, Paula Feng MD, 10 mg at 01/18/22 1109    ondansetron (ZOFRAN-ODT) disintegrating tablet 4 mg, 4 mg, Oral, Once, Paula Feng, MD  ALLERGIES     Macrodantin [nitrofurantoin macrocrystal] and Sulfa antibiotics  Immunization History   Administered Date(s) Administered    COVID-19, Pfizer Purple top, DILUTE for use, 12+ yrs, 30mcg/0.3mL dose 01/28/2021, 02/18/2021, 08/18/2021      Internal Administration   First Dose COVID-19, Pfizer Purple top, DILUTE for use, 12+ yrs, 30mcg/0.3mL dose  01/28/2021   Second Dose COVID-19, Pfizer Purple top, DILUTE for use, 12+ yrs, 30mcg/0.3mL dose   02/18/2021       Last COVID Lab SARS-CoV-2 (no units)   Date Value   01/20/2021 Not Detected     SARS-CoV-2 Antibody, Total (no units)   Date Value   01/13/2022 Non-Reactive     SARS-CoV-2, PCR (no units)   Date Value   01/11/2022 Not Detected     SARS-CoV-2, NAAT (no units)   Date Value   01/10/2022 Not Detected            PAST MEDICAL HISTORY     Past Medical History:   Diagnosis Date    Abnormal EKG     left bundle branch block    Acute gout involving toe of right foot 9/3/2020    Acute gout involving toe of right foot 9/3/2020    Acute on chronic combined systolic (congestive) and diastolic (congestive) heart failure (HCC)     Cancer (HCC)     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    Cerebrovascular disease     Clotting disorder ([address] 1985    thrombophlebitis after c section delivery    Depression     15 years followed by dr Carmen Winter    Hyperlipidemia     Hypertension     Hyperthyroidism     Leg swelling 9/3/2020    Lymphedema of both lower extremities 9/3/2020    Peripheral vascular disease ([address]     Renal failure 11/2012    renal transplant    Thrombophlebitis     after c section delivery    Thyroid disease      SURGICAL HISTORY       Past Surgical History:   Procedure Laterality Date   [address]    one normal delivery    COLECTOMY N/A 1/15/2022    DIAGNOSTIC  LAPAROSCOPY LYSIS OF ADHESIONS performed by Baldev Frey MD at [address] [place] [address] Drive CATH LAB PROCEDURE  2006    normal coronaries and 1996 normal coronaries    DIALYSIS FISTULA CREATION  march and july 2012    phase one and two patient will start dialysis when needed   [address]    transfemoral venous bypass grafts    KIDNEY TRANSPLANT  2016    KIDNEY TRANSPLANT  2015    KIDNEY TRANSPLANT  2015    PARATHYROIDECTOMY  2006    left parathyroid  implant and parathyroidectomy     PHYSICAL EXAM       Vitals:    01/19/22 0445 01/19/22 0500 01/19/22 0644 01/19/22 0729   BP: (!) 122/57   114/62   Pulse: 80   87   Resp: 18  18 18   Temp: 98.6 °F (37 °C)   98.6 °F (37 °C)   TempSrc: Axillary   Axillary   SpO2:  97%  94%   Weight:       Height:         Physical Exam   Constitutional/General:   IN BED  Head: NC/AT  Pulmonary: Lungs  DEc   to auscultation bilaterally ant .   on  BIPAP  Cardiovascular:  Regular rate and rhythm   Abdomen: Soft, dec  BS. distension. NT   NGT  Extremities:     Warm and well perfused OVERALL Edema  Skin: Warm and dry   Neurologic:    lethargic   RIGHT MIDLINE  MERCHANT      DIAGNOSTIC RESULTS   RADIOLOGY:   CT ABDOMEN PELVIS WO CONTRAST Additional Contrast? None    Result Date: 1/10/2022  EXAMINATION: CT OF THE ABDOMEN AND PELVIS WITHOUT CONTRAST 1/10/2022 6:56 pm TECHNIQUE: CT of the abdomen and pelvis was performed without the administration of intravenous contrast. Multiplanar reformatted images are provided for review. Dose modulation, iterative reconstruction, and/or weight based adjustment of the mA/kV was utilized to reduce the radiation dose to as low as reasonably achievable. COMPARISON: CT abdomen pelvis 11/17/2021 HISTORY: ORDERING SYSTEM PROVIDED HISTORY: evaluate for diarrhea TECHNOLOGIST PROVIDED HISTORY: Reason for exam:->evaluate for diarrhea Additional Contrast?->None Decision Support Exception - unselect if not a suspected or confirmed emergency medical condition->Emergency Medical Condition (MA) FINDINGS: Lower Chest: Atherosclerotic disease. Atrophic kidneys with multiple cysts. Some of the presumed cysts appear hyperdense. Subcentimeter foci in the kidneys are too small to accurately characterize. There is a transplant kidney in the right lower quadrant. There is no hydronephrosis of the transplant kidney. Decreased perinephric stranding above the transplant kidney. Unremarkable bladder. Unremarkable appearance of the reproductive organs. No abdominal aortic aneurysm. Atherosclerotic disease. No evidence of cholecystitis or pancreatitis. No free air or significant free fluid. No evidence of bowel obstruction. Nondilated appendix. No significant fluid within the large bowel. Small fat containing umbilical hernia. Mild degenerative changes of the spine. Presumed surgical scars in the anterior abdominal wall. No significant fluid within the large bowel. No hydronephrosis of the transplant kidneys. The native kidneys are atrophic and have multiple intermediate density cysts which should be further evaluated with nonemergent ultrasound. Atherosclerotic disease. XR CHEST PORTABLE    Result Date: 1/11/2022  EXAMINATION: ONE XRAY VIEW OF THE CHEST 1/11/2022 9:11 am COMPARISON: None. HISTORY: ORDERING SYSTEM PROVIDED HISTORY: shortness of breath TECHNOLOGIST PROVIDED HISTORY: Reason for exam:->shortness of breath FINDINGS: The heart is enlarged. There are no findings of failure. The lungs are clear. There is no focal infiltrate or effusion. 1. Stable cardiomegaly. There is no evidence of failure or pneumonia.      LABS  Recent Labs     01/17/22 1938 01/19/22 0128 01/19/22 0527   WBC 21.2* 20.5* 21.4*   HGB 10.5* 11.2* 11.6   HCT 37.1 39.1 39.6   .1* 106.5* 104.8*    154 96*     Recent Labs     01/17/22 1938 01/17/22 1938 01/19/22 0128 01/19/22 0128 01/19/22 0527 01/19/22 0527 01/19/22  0700     --  144  --  145  --   --    K 5.2*   < > 5.2*   < > 8.5*   < > 5.3*   *   < > 114*   < > 118*  --   --    CO2 23   < > 20*   < > 14*  --   -- BUN 72*  --  72*  --  77*  --   --    CREATININE 2.2*  --  1.8*  --  1.7*  --   --    GFRAA 27  --  34  --  36  --   --    LABGLOM 22  --  28  --  30  --   --    GLUCOSE 119*  --  132*  --  110*  --   --    PROT 4.8*  4.8*  --  4.9*  --  5.1*  --   --    LABALBU 2.3*  2.3*  --  2.5*  --  2.3*  --   --    CALCIUM 10.1  --  10.0  --  10.7*  --   --    BILITOT 0.4  0.4  --  0.5  --  0.4  --   --    ALKPHOS 74  76  --  73  --  77  --   --    AST 13  12  --  10  --  24  --   --    ALT 12  13  --  10  --  12  --   --     < > = values in this interval not displayed. No results for input(s): PROCAL in the last 72 hours. Lab Results   Component Value Date    CRP 34.6 (H) 11/17/2021     Lab Results   Component Value Date    SEDRATE 62 (H) 11/20/2021     No results found for: [id number]  Lab Results   Component Value Date    COVID19 Non-Reactive 01/13/2022    COVID19 Not Detected 01/11/2022     COVID-19/MARINA-COV2 LABS  Recent Labs     01/17/22  1938 01/19/22  0128 01/19/22  0527   AST 13  12 10 24   ALT 12  13 10 12           MICROBIOLOGY:  Lab Results   Component Value Date    BC 5 Days no growth 01/11/2022    BC 5 Days no growth 11/17/2021    BC  12/17/2019     Refer to previous culture of CXBLD from 12-17-19 @1120 for  susceptibility results      ORG Enterococcus faecalis 01/12/2022    ORG Klebsiella pneumoniae ssp pneumoniae 11/17/2021    ORG Staphylococcus coagulase-negative 11/17/2021    ORG Klebsiella pneumoniae ssp pneumoniae 11/17/2021     Lab Results   Component Value Date    BLOODCULT2 5 Days no growth 01/12/2022    BLOODCULT2  11/17/2021     This organism was isolated in one set. Susceptibility testing is not routinely done as this  organism frequently represents skin contamination. Additional testing can be ordered by calling the  Microbiology Department.       ORG Enterococcus faecalis 01/12/2022    ORG Klebsiella pneumoniae ssp pneumoniae 11/17/2021    ORG Staphylococcus coagulase-negative 11/17/2021    ORG Klebsiella pneumoniae ssp pneumoniae 11/17/2021        Urine Culture, Routine   Date Value Ref Range Status   01/12/2022 >100,000 CFU/ml  Final   11/17/2021 <10,000 CFU/mL  Mixed gram positive organisms   (A)  Final   11/17/2021 >100,000 CFU/ml  Final     MRSA Culture Only   Date Value Ref Range Status   11/17/2021 Methicillin resistant Staph aureus not isolated  Final          FINAL IMPRESSION    Patient is a 73 y.o. female who presented with   Chief Complaint   Patient presents with    Fatigue     patient attempted to go to pcp today and didnt have the strength. came here instead. patient states sepsis prior to thanksgiving requiring admission and rehab.    and admitted for CECILY (acute kidney injury) ([facility] [N17.9]   IMMUNOCOMPROMISED PT RENAL TRANSPLANT  LEUKOCYTOSIS  pneumatosis involving the ascending to transverse colon. S/p BOWEL RESECTION LAPAROSCOPIC  Lysis of adhesions  VSEnterococcus faecalis   uti    ? HCAP   CECILY    FUNGITELL +     S/P RX KLEBSIELLA/CONS BACTEREMIA  S/P RX E COLI UTI       mycophenolate (CELLCEPT) 200 MG/ML suspension 500 mg, BID  metronidazole (FLAGYL) 500 mg in NaCl 100 mL IVPB premix, Q8H  linezolid (ZYVOX) IVPB 600 mg, Q12H will stop switch to daptomycin due to thrombocytopenia   predniSONE (DELTASONE) tablet 5 mg, Daily     cefepime (MAXIPIME) 2000 mg IVPB extended (mini-bag), Q24H     Cont  ANTFUNGAL     ADJUST ATBX TO DEC CNS TOXICITY         Imaging and labs were reviewed per medical records       Thank you for involving me in the care of Mat Stanley. Please do not hesitate to call for any questions or concerns.          Electronically signed by Chanel Rodriguez MD on 1/19/2022 at 8:20 AM

## 2022-01-19 NOTE — PROGRESS NOTES
[facility] MD Augustina Haque MD. Denise Manzano MD   Progress Note    1/19/2022    SUBJECTIVE:   1/13:Seen in her room , lying flat on o2/nc , euvolemic , confused   D/w RN on floor   1/14: Patient was seen, interviewed and examined in her room, overall feeling much better with less confusion today. Discussed the case in detail with the nurse, patient had history of kidney transplant in 2015, has maintained of 2 medications including cyclosporine and MMF. We will check with lab to obtain values both medications random throughout toxicity levels that could be responsible for her confusion at one point or another. 1/15: Patient was seen in her room. Case discussed with her son was informing me that, she was scheduled for expiratory laparotomy for probable obstruction of her bowels as per general surgery. We will follow clinical status closely with surgery or any other intervention needed from nephrology. 1/16: Patient underwent yesterday laparoscopic lysis of adhesions with no other pathology being found as per general surgery, will keep following her renal status closely with BUN today is 36 and creatinine down to 1.5 overall stable  1/17 : sleeping on bipap when seeing her   No LE edema   Cr creeping up   General surgery feel there is no intraabdominal pathology   1/18: Patient sleepy but arousable, input of general surgery appreciated clarifying that there was no pneumatosis abdomen distention thought to be secondary to sepsis the patient is then for different reasons. Patient has history of congestive heart failure for which cardiology is following she is sleeping on BiPAP with no lower extremity edema. Patient is status post transplant on cyclosporine and whole blood was sent for assessing the level.   1/19 : seen  In her room continue to be lethargic comfused   On TF   BP stable   Good UO via hassan     PROBLEM LIST:    Active Problems:    CECILY (acute kidney injury) [facility])    Ischemic bowel disease ([facility]  Resolved Problems:    * No resolved hospital problems.  *       DIET:    Diet NPO Exceptions are: Ice Chips, Sips of Water with Meds  ADULT TUBE FEEDING; Nasogastric; Renal Formula; Continuous; 10; Yes; 10; Q 12 hours; 30; 30; Q 4 hours       Allergies : Macrodantin [nitrofurantoin macrocrystal] and Sulfa antibiotics    Past Medical History:   Diagnosis Date    Abnormal EKG     left bundle branch block    Acute gout involving toe of right foot 9/3/2020    Acute gout involving toe of right foot 9/3/2020    Acute on chronic combined systolic (congestive) and diastolic (congestive) heart failure (HCC)     Cancer (HCC)     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    Cerebrovascular disease     Clotting disorder ([facility]     thrombophlebitis after c section delivery    Depression     15 years followed by dr Valdemar Kennedy    Hyperlipidemia     Hypertension     Hyperthyroidism     Leg swelling 9/3/2020    Lymphedema of both lower extremities 9/3/2020    Peripheral vascular disease ([facility]     Renal failure 2012    renal transplant    Thrombophlebitis     after c section delivery    Thyroid disease        Past Surgical History:   Procedure Laterality Date     SECTION  1985    one normal delivery    COLECTOMY N/A 1/15/2022    DIAGNOSTIC  LAPAROSCOPY LYSIS OF ADHESIONS performed by Kade Herndon MD at [address] [place] [address] Drive CATH LAB PROCEDURE      normal coronaries and  normal coronaries    DIALYSIS FISTULA CREATION  march and 2012    phase one and two patient will start dialysis when needed   [address]    transfemoral venous bypass grafts    KIDNEY TRANSPLANT  2016    KIDNEY TRANSPLANT  2015    KIDNEY TRANSPLANT  2015    PARATHYROIDECTOMY  2006    left parathyroid  implant and parathyroidectomy       Family History   Problem Relation Age of Onset    Diabetes Mother     Diabetes Father    Saray Manual Dementia Father         reports that she quit smoking about 6 years ago. She has a 20.00 pack-year smoking history. She has never used smokeless tobacco. She reports that she does not drink alcohol and does not use drugs. Review of Systems:   Constitutional: no fevers , no chills , feels ok   Eyes: no eye pain , no itching , no drainage  Ears, nose, mouth, throat, and face: no ear ,nose pain , hearing is ok ,no nasal drainage   Respiratory: no sob ,no cough ,no wheezing . Cardiovascular: no chest pain , no palpitation ,no sob . Gastrointestinal: no nausea, vomiting , constipation , no abdominal pain . Genitourinary:no urinary retention , no burning , dysuria . No polyuria   Hematologic/lymphatic: no bleeding , no cougulation issues . Musculoskeletal:no joint pain , no swelling . Neurological: no headaches ,no weakness , no numbness . Endocrine: no thirst , no weight issues .      MEDS (scheduled):    lidocaine PF  5 mL IntraDERmal Once    sodium chloride flush  5-40 mL IntraVENous 2 times per day    heparin flush  3 mL IntraVENous 2 times per day    daptomycin (CUBICIN) in NS IV syringe  8 mg/kg (Adjusted) IntraVENous Q24H    ipratropium-albuterol  3 mL Inhalation 4x daily    pantoprazole  40 mg IntraVENous BID    And    sodium chloride (PF)  10 mL IntraVENous BID    fluconazole  400 mg IntraVENous Q24H    metoprolol tartrate  50 mg Oral BID    docusate  100 mg Oral BID    metroNIDAZOLE  500 mg IntraVENous Q8H    heparin (porcine)  5,000 Units SubCUTAneous BID    predniSONE  5 mg Oral Daily    heparin flush  1 mL IntraVENous 2 times per day    cefepime  2,000 mg IntraVENous Q24H    allopurinol  100 mg Oral Daily    budesonide  500 mcg Nebulization BID    desvenlafaxine succinate  50 mg Oral QPM    folic acid  1 mg Oral Daily    levothyroxine  50 mcg Oral Daily    atorvastatin  10 mg Oral Daily    ondansetron  4 mg Oral Once       MEDS (infusions):   sodium chloride      sodium 114* 118*  --    CO2 23  --  20* 14*  --    MG 2.3  --  2.2 2.3  --    PHOS 4.4  --  3.0 3.1  --    BUN 72*  --  72* 77*  --    CREATININE 2.2*  --  1.8* 1.7*  --    ALT 12  13  --  10 12  --    AST 13  12  --  10 24  --    BILIDIR 0.3  --   --   --   --    BILITOT 0.4  0.4  --  0.5 0.4  --    ALKPHOS 74  76  --  73 77  --     < > = values in this interval not displayed. No results found for: LABPROT    ASSESSMENT / RECOMMENDATIONS:      1. Acute kidney injury, felt to be related to volume depletion and  consists of diarrhea and poor p.o. intake. CECILY resolving with BUN today of 72 and creatinine down to 2.2.  2.  Chronic kidney disease, stage III. The patient with kidney  transplant. Baseline creatinine 1.5.  3. Immunosuppressant host, chronically on cyclosporin 100 mg b.i.d.,  CellCept 1000 mg b.i.d., and prednisone 5 mg. 4.  Sepsis. Need to rule out a C. diff. infection as the patient is  reporting diarrhea. 5.  Obesity.   6.  Hypotension.     PLAN:    -switch iv fluids to bicarbonate drip   -BMP later on today     Electronically signed by Angelica Denise MD on 1/19/2022 at 12:01 PM

## 2022-01-19 NOTE — PROGRESS NOTES
Room #:   3936/8447-82    Date: 2022       Patient Name: Merlyn Lombardo  : 1955      MRN: [id number]     Patient unavailable for physical therapy treatment due to unavailable/not appropriate per nursing due to being transferred to ICU     Glenna Estrada PT

## 2022-01-19 NOTE — PROGRESS NOTES
Pulmonary/Critical Care Progress Note    We are following patient for acute respiratory failure with hypoxia and hypercapnia, hypoventilation, acute mental status change, pneumatosis of the colon, colitis, UTI, history of kidney transplant, home immunosuppression agent, acute on chronic renal failure, hypothyroidism, history of bipolar disorder, COPD. In Summary:  Escobar Nur is a 73 y.o. female with hx of Kidney TX, , CKD, Bipolar, morbid obesity who presents with pneumatosis of colon, altered mental status         ISSUES:        Acute Hypercapneic Respiratory Failure   COPD  Morbid Obesity  ? Baseline obesity hypoventialtion  On BiPAP  On max IPAP  20   ABG improved  Repeat ABG ordered  If worsens (drops < 7.2) will need intubation  Continue present management for now        AMS  ? Secondary to above   Unclear cause    Pneumatosis of colon, no evidence of bowel obstruction  Colitis  Bowel adhesion  Continue IV fluid. NG tube to low wall intermittent suction. Being followed by GI / Surgery      UTI  History of kidney transplant, on immunosuppressant agents  Acute on chronic renal failure  cefepime/Zyvox/Diflucan/Flagyl per ID. Hypertension  Bipolar disorder  Hypothyroidism. History of         ____________________________________      SUBJECTIVE:  Patient seen and examined in room. She is still very somnolent but arousable.     MEDICATIONS:   lidocaine PF  5 mL IntraDERmal Once    sodium chloride flush  5-40 mL IntraVENous 2 times per day    heparin flush  3 mL IntraVENous 2 times per day    daptomycin (CUBICIN) in NS IV syringe  8 mg/kg (Adjusted) IntraVENous Q24H    ipratropium-albuterol  3 mL Inhalation 4x daily    pantoprazole  40 mg IntraVENous BID    And    sodium chloride (PF)  10 mL IntraVENous BID    fluconazole  400 mg IntraVENous Q24H    metoprolol tartrate  50 mg Oral BID    docusate  100 mg Oral BID    metroNIDAZOLE  500 mg IntraVENous Q8H    heparin (porcine)  5,000 Units SubCUTAneous BID    predniSONE  5 mg Oral Daily    heparin flush  1 mL IntraVENous 2 times per day    cefepime  2,000 mg IntraVENous Q24H    allopurinol  100 mg Oral Daily    budesonide  500 mcg Nebulization BID    desvenlafaxine succinate  50 mg Oral QPM    folic acid  1 mg Oral Daily    levothyroxine  50 mcg Oral Daily    atorvastatin  10 mg Oral Daily    ondansetron  4 mg Oral Once      sodium chloride      sodium bicarbonate infusion      sodium chloride 25 mL (01/16/22 0035)     sodium chloride flush, sodium chloride, heparin flush, perflutren lipid microspheres, morphine, ondansetron **OR** ondansetron, polyethylene glycol, acetaminophen **OR** acetaminophen, sodium chloride flush, sodium chloride, heparin flush      REVIEW OF SYSTEMS:  Unable to obtain due to patient mental status. OBJECTIVE:  Vitals:    01/19/22 1342   BP:    Pulse:    Resp: 19   Temp:    SpO2:      FiO2 : 40 %  O2 Flow Rate (L/min): 5.5 L/min  O2 Device: PAP (positive airway pressure)    PHYSICAL EXAM:  Constitutional: Afebrile  Skin: Warm, dry, no rashes. HEENT: Atraumatic, normocephalic, PERRLA  Neck: Supple, no lymphadenopathy or thyromegaly. No carotid bruit. Cardiovascular: Normal S1 and S2, no murmur. Respiratory: Diminished breathing sound bilaterally. Gastrointestinal: Distended, hypoactive bowel sounds.   Genitourinary: Unremarkable  Extremities: 1+ pitting edema on both lower extremity  Neurological: Unable to assess  Psychological: Unable to assess    LABS:  WBC   Date Value Ref Range Status   01/19/2022 21.4 (H) 4.5 - 11.5 E9/L Final   01/19/2022 20.5 (H) 4.5 - 11.5 E9/L Final   01/17/2022 21.2 (H) 4.5 - 11.5 E9/L Final     Hemoglobin   Date Value Ref Range Status   01/19/2022 11.6 11.5 - 15.5 g/dL Final   01/19/2022 11.2 (L) 11.5 - 15.5 g/dL Final   01/17/2022 10.5 (L) 11.5 - 15.5 g/dL Final     Hematocrit   Date Value Ref Range Status   01/19/2022 39.6 34.0 - 48.0 % Final   01/19/2022 39.1 34.0 - 48.0 % Final   01/17/2022 37.1 34.0 - 48.0 % Final     MCV   Date Value Ref Range Status   01/19/2022 104.8 (H) 80.0 - 99.9 fL Final   01/19/2022 106.5 (H) 80.0 - 99.9 fL Final   01/17/2022 109.1 (H) 80.0 - 99.9 fL Final     Platelets   Date Value Ref Range Status   01/19/2022 96 (L) 130 - 450 E9/L Final   01/19/2022 154 130 - 450 E9/L Final   01/17/2022 174 130 - 450 E9/L Final     Sodium   Date Value Ref Range Status   01/19/2022 145 132 - 146 mmol/L Final   01/19/2022 144 132 - 146 mmol/L Final   01/17/2022 143 132 - 146 mmol/L Final     Potassium   Date Value Ref Range Status   01/19/2022 5.3 (H) 3.5 - 5.0 mmol/L Final   01/19/2022 8.5 (HH) 3.5 - 5.0 mmol/L Final     Comment:     Specimen is moderately Hemolyzed. Result may be artificially increased. 01/19/2022 5.2 (H) 3.5 - 5.0 mmol/L Final     Potassium reflex Magnesium   Date Value Ref Range Status   01/10/2022 5.5 (H) 3.5 - 5.0 mmol/L Final     Comment:     Specimen is moderately Hemolyzed. Result may be artificially increased. 12/17/2019 5.2 (H) 3.5 - 5.0 mmol/L Final     Comment:     Specimen is moderately Hemolyzed. Result may be artificially increased.      Chloride   Date Value Ref Range Status   01/19/2022 118 (H) 98 - 107 mmol/L Final   01/19/2022 114 (H) 98 - 107 mmol/L Final   01/17/2022 113 (H) 98 - 107 mmol/L Final     CO2   Date Value Ref Range Status   01/19/2022 14 (L) 22 - 29 mmol/L Final   01/19/2022 20 (L) 22 - 29 mmol/L Final   01/17/2022 23 22 - 29 mmol/L Final     BUN   Date Value Ref Range Status   01/19/2022 77 (H) 6 - 23 mg/dL Final   01/19/2022 72 (H) 6 - 23 mg/dL Final   01/17/2022 72 (H) 6 - 23 mg/dL Final     CREATININE   Date Value Ref Range Status   01/19/2022 1.7 (H) 0.5 - 1.0 mg/dL Final   01/19/2022 1.8 (H) 0.5 - 1.0 mg/dL Final   01/17/2022 2.2 (H) 0.5 - 1.0 mg/dL Final     Glucose   Date Value Ref Range Status   01/19/2022 110 (H) 74 - 99 mg/dL Final   01/19/2022 132 (H) 74 - 99 mg/dL Final   01/17/2022 119 (H) 74 - 99 mg/dL Kidney Disease: less than 60 ml/min/1.73 sq.m. Kidney Failure: less than 15 ml/min/1.73 sq.m. Results valid for patients 18 years and older. 01/17/2022 22 >=60 mL/min/1.73 Final     Comment:     Chronic Kidney Disease: less than 60 ml/min/1.73 sq.m. Kidney Failure: less than 15 ml/min/1.73 sq.m. Results valid for patients 18 years and older. GFR    Date Value Ref Range Status   01/19/2022 36  Final   01/19/2022 34  Final   01/17/2022 27  Final     Magnesium   Date Value Ref Range Status   01/19/2022 2.3 1.6 - 2.6 mg/dL Final   01/19/2022 2.2 1.6 - 2.6 mg/dL Final   01/17/2022 2.3 1.6 - 2.6 mg/dL Final     Phosphorus   Date Value Ref Range Status   01/19/2022 3.1 2.5 - 4.5 mg/dL Final   01/19/2022 3.0 2.5 - 4.5 mg/dL Final   01/17/2022 4.4 2.5 - 4.5 mg/dL Final     Recent Labs     01/19/22  0130   PH 7.333*   PO2 89.9   PCO2 42.9   HCO3 22.3   BE -3.5*   O2SAT 96.3       RADIOLOGY:  CT ABDOMEN PELVIS WO CONTRAST Additional Contrast? None   Final Result   1. Colitis involving the left colon from splenic flexure to mid to distal   sigmoid colon. 2. Atrophy of native kidneys. Multiple renal cysts and nonobstructing left   renal calculus. Transplant kidney in right iliac fossa. 3. Trace ascites superficial to the liver. 4. Unchanged consolidation in lower lobes. 5. Unchanged cardiomegaly. 6. Possible gallbladder sludge and or cholelithiasis. RECOMMENDATIONS:   Unavailable         XR ABDOMEN (KUB) (SINGLE AP VIEW)   Final Result   No evidence of bowel obstruction. CT CHEST WO CONTRAST   Final Result   1. Bilateral lower lobe alveolar opacities compatible with pneumonia. Increased consolidation in the right lower lobe compared to the prior study. 2.  Trace effusions and trace pericardial effusion with left ventricular   dilatation. 3.  No evidence to suggest empyema. 4.  Gastric catheter passes into the stomach.          XR CHEST PORTABLE   Final Result   Infiltrate and or atelectasis left greater than right with mildly improving   aeration in the left mid lung. MRI BRAIN WO CONTRAST   Final Result   No acute intracranial abnormality. XR ABDOMEN FOR NG/OG/NE TUBE PLACEMENT   Final Result   NG tube in good position. XR CHEST PORTABLE   Final Result   Cardiomegaly. Findings for an infiltrate with some consolidation in the mid lower aspect of   the left lung. CT ABDOMEN PELVIS WO CONTRAST Additional Contrast? Oral   Final Result   There is pneumatosis involving the ascending to transverse colon. There is   wall thickening of the splenic flexure to descending colon. This is   nonspecific but can be seen related to ischemic enteritis but there is no   evidence for portal venous gas,, medication induced pneumatosis in a patient   taking steroids or chemotherapy, procedure related. Clinical correlation   recommended. Surgical consultation may be needed. There is fairly dense bibasilar infiltrates which have progressed from prior   exam.  Correlation with fever and white count and follow-up to resolution   recommended. Multiple mixed density cysts both kidneys related to chronic renal failure   with the transplant right lower quadrant. There is distention of small bowel in the lower pelvis without obstructing   lesion identified. Small hiatal hernia. XR ABDOMEN (KUB) (SINGLE AP VIEW)   Final Result   Abundant gas and stool throughout the colon may represent constipation. XR CHEST PORTABLE   Final Result   1. Stable cardiomegaly. There is no evidence of failure or pneumonia. CT ABDOMEN PELVIS WO CONTRAST Additional Contrast? None   Final Result   No significant fluid within the large bowel. No hydronephrosis of the transplant kidneys.   The native kidneys are atrophic   and have multiple intermediate density cysts which should be further   evaluated with nonemergent ultrasound. Atherosclerotic disease.          IR MIDLINE CATH    (Results Pending)   XR ABDOMEN (KUB) (SINGLE AP VIEW)    (Results Pending)           Electronically signed by Shawn Sharpe MD on 1/19/2022 at 2:35 PM

## 2022-01-19 NOTE — PROGRESS NOTES
Comprehensive Nutrition Assessment    Type and Reason for Visit:  Initial (LOS)    Nutrition Recommendations/Plan: ADAT when medically feasible or restart TF. Will monitor for nutrition progression and provide recs as needed. Nutrition Assessment:  Pt adm lethargic w/ sepsis/AMS/resp failure/reported pneumatosis of the colon on CT s/p dx lap 1/15 w/ intra-abdominal adhesions w/ lysis. Hx CKD s/p kidney transplant (2015)/ HTN/CHF/COPD. Pt currently NPO, noted unable to eat d/t being groggy. Noted cleared for diet advancement. Will monitor for nutrition progression. ADAT when medically feasible. Malnutrition Assessment:  Malnutrition Status: At risk for malnutrition (Comment)    Context:  Acute Illness     Findings of the 6 clinical characteristics of malnutrition:  Energy Intake:  7 - 50% or less of estimated energy requirements for 5 or more days  Weight Loss:  No significant weight loss     Body Fat Loss:  No significant body fat loss     Muscle Mass Loss:  No significant muscle mass loss    Fluid Accumulation:  No significant fluid accumulation     Strength:  Not Performed    Estimated Daily Nutrient Needs:  Energy (kcal):  5716-4512; Weight Used for Energy Requirements:  Current     Protein (g):  50-60;  Weight Used for Protein Requirements:  Ideal (1.0-1.2 adjusted w/ renal status)        Fluid (ml/day):  9635-9560; Method Used for Fluid Requirements:  1 ml/kcal      Nutrition Related Findings:  disoriented, BiPAP, active BS, soft/round/rotund/distended abd, NG to LIS, +3BLE/BUE edema, BUN/creat elevated, GFR <30      Wounds:  None       Current Nutrition Therapies:    Diet NPO Exceptions are: Ice Chips, Sips of Water with Meds  ADULT TUBE FEEDING; Nasogastric; Renal Formula; Continuous; 10; Yes; 10; Q 12 hours; 30; 30; Q 4 hours  Current Tube Feeding (TF) Orders:  · Feeding Route: Nasogastric  · Formula: Renal Formula  · Schedule: Continuous (30mL/hr = 720mL TV)  · Water Flushes: 30Q4= 180mL free water  · Current TF & Flush Orders Provides: TF not started  · Goal TF & Flush Orders Provides: 720mL TV, 1296kcals, 58g PRO, 523mL water, 703mL total water    Anthropometric Measures:  · Height: 5' 2\" (157.5 cm)  · Current Body Weight: 216 lb (98 kg) (1/19 bed scale)   · Admission Body Weight: 218 lb 12.8 oz (99.2 kg) (1/11 bed scale)    · Usual Body Weight: 227 lb 3.2 oz (103.1 kg) (11/17/21 bed scale)     · Ideal Body Weight: 110 lbs; % Ideal Body Weight 196.4 %   · BMI: 39.5  · BMI Categories: Obese Class 2 (BMI 35.0 -39.9)       Nutrition Diagnosis:   · Inadequate oral intake related to impaired respiratory function (resp failure) as evidenced by NPO or clear liquid status due to medical condition    Nutrition Interventions:   Food and/or Nutrient Delivery:  Continue NPO (monitor for nutrition progression, ADAT when medically feasible or restart TF.)  Nutrition Education/Counseling:  No recommendation at this time   Coordination of Nutrition Care:  Continue to monitor while inpatient    Goals:  Nutrition progression       Nutrition Monitoring and Evaluation:   Behavioral-Environmental Outcomes:  None Identified   Food/Nutrient Intake Outcomes:  Enteral Nutrition Intake/Tolerance,Diet Advancement/Tolerance  Physical Signs/Symptoms Outcomes:  Biochemical Data,Nutrition Focused Physical Findings,Weight,Skin,GI Status,Fluid Status or Edema     Discharge Planning:     Too soon to determine     Electronically signed by Imelda Ahmadi, MS, RD, LD on 1/19/22 at 12:20 PM EST    Contact: [phone]

## 2022-01-20 LAB
ALBUMIN SERPL-MCNC: 2.2 G/DL (ref 3.5–5.2)
ALP BLD-CCNC: 68 U/L (ref 35–104)
ALT SERPL-CCNC: 6 U/L (ref 0–32)
ANION GAP SERPL CALCULATED.3IONS-SCNC: 8 MMOL/L (ref 7–16)
AST SERPL-CCNC: 10 U/L (ref 0–31)
BASOPHILS ABSOLUTE: 0.04 E9/L (ref 0–0.2)
BASOPHILS RELATIVE PERCENT: 0.2 % (ref 0–2)
BILIRUB SERPL-MCNC: 0.4 MG/DL (ref 0–1.2)
BK VIRUS COPY: <390 CPY/ML
BK VIRUS DAN, QN PCR: <2.6 LOG
BK VIRUS QUANTITATIVE, PCR: NOT DETECTED
BK VIRUS SOURCE: NORMAL
BUN BLDV-MCNC: 61 MG/DL (ref 6–23)
CALCIUM SERPL-MCNC: 9.9 MG/DL (ref 8.6–10.2)
CHLORIDE BLD-SCNC: 113 MMOL/L (ref 98–107)
CO2: 26 MMOL/L (ref 22–29)
CREAT SERPL-MCNC: 1.6 MG/DL (ref 0.5–1)
CYCLOSPORINE A: 113.4 NG/ML
EOSINOPHILS ABSOLUTE: 0.16 E9/L (ref 0.05–0.5)
EOSINOPHILS RELATIVE PERCENT: 0.9 % (ref 0–6)
GFR AFRICAN AMERICAN: 39
GFR NON-AFRICAN AMERICAN: 32 ML/MIN/1.73
GLUCOSE BLD-MCNC: 112 MG/DL (ref 74–99)
HCT VFR BLD CALC: 36.1 % (ref 34–48)
HEMOGLOBIN: 10.5 G/DL (ref 11.5–15.5)
IMMATURE GRANULOCYTES #: 0.41 E9/L
IMMATURE GRANULOCYTES %: 2.2 % (ref 0–5)
LYMPHOCYTES ABSOLUTE: 0.5 E9/L (ref 1.5–4)
LYMPHOCYTES RELATIVE PERCENT: 2.7 % (ref 20–42)
MAGNESIUM: 2.1 MG/DL (ref 1.6–2.6)
MCH RBC QN AUTO: 30.9 PG (ref 26–35)
MCHC RBC AUTO-ENTMCNC: 29.1 % (ref 32–34.5)
MCV RBC AUTO: 106.2 FL (ref 80–99.9)
MONOCYTES ABSOLUTE: 0.77 E9/L (ref 0.1–0.95)
MONOCYTES RELATIVE PERCENT: 4.2 % (ref 2–12)
NEUTROPHILS ABSOLUTE: 16.57 E9/L (ref 1.8–7.3)
NEUTROPHILS RELATIVE PERCENT: 89.8 % (ref 43–80)
PDW BLD-RTO: 15.9 FL (ref 11.5–15)
PHOSPHORUS: 2.2 MG/DL (ref 2.5–4.5)
PLATELET # BLD: 113 E9/L (ref 130–450)
PMV BLD AUTO: 12.6 FL (ref 7–12)
POTASSIUM SERPL-SCNC: 4.2 MMOL/L (ref 3.5–5)
RBC # BLD: 3.4 E12/L (ref 3.5–5.5)
SODIUM BLD-SCNC: 147 MMOL/L (ref 132–146)
TOTAL PROTEIN: 4.6 G/DL (ref 6.4–8.3)
WBC # BLD: 18.5 E9/L (ref 4.5–11.5)

## 2022-01-20 PROCEDURE — 83735 ASSAY OF MAGNESIUM: CPT

## 2022-01-20 PROCEDURE — 84100 ASSAY OF PHOSPHORUS: CPT

## 2022-01-20 PROCEDURE — 99233 SBSQ HOSP IP/OBS HIGH 50: CPT | Performed by: INTERNAL MEDICINE

## 2022-01-20 PROCEDURE — 6370000000 HC RX 637 (ALT 250 FOR IP): Performed by: INTERNAL MEDICINE

## 2022-01-20 PROCEDURE — 94660 CPAP INITIATION&MGMT: CPT

## 2022-01-20 PROCEDURE — 80053 COMPREHEN METABOLIC PANEL: CPT

## 2022-01-20 PROCEDURE — 85025 COMPLETE CBC W/AUTO DIFF WBC: CPT

## 2022-01-20 PROCEDURE — 2700000000 HC OXYGEN THERAPY PER DAY

## 2022-01-20 PROCEDURE — 6370000000 HC RX 637 (ALT 250 FOR IP): Performed by: SURGERY

## 2022-01-20 PROCEDURE — 6360000002 HC RX W HCPCS: Performed by: SURGERY

## 2022-01-20 PROCEDURE — 94640 AIRWAY INHALATION TREATMENT: CPT

## 2022-01-20 PROCEDURE — 6360000002 HC RX W HCPCS: Performed by: SPECIALIST

## 2022-01-20 PROCEDURE — A4216 STERILE WATER/SALINE, 10 ML: HCPCS | Performed by: HOSPITALIST

## 2022-01-20 PROCEDURE — 2580000003 HC RX 258: Performed by: SPECIALIST

## 2022-01-20 PROCEDURE — 36415 COLL VENOUS BLD VENIPUNCTURE: CPT

## 2022-01-20 PROCEDURE — 2500000003 HC RX 250 WO HCPCS: Performed by: INTERNAL MEDICINE

## 2022-01-20 PROCEDURE — 6360000002 HC RX W HCPCS: Performed by: INTERNAL MEDICINE

## 2022-01-20 PROCEDURE — C9113 INJ PANTOPRAZOLE SODIUM, VIA: HCPCS | Performed by: HOSPITALIST

## 2022-01-20 PROCEDURE — 97110 THERAPEUTIC EXERCISES: CPT

## 2022-01-20 PROCEDURE — 6360000002 HC RX W HCPCS: Performed by: HOSPITALIST

## 2022-01-20 PROCEDURE — 2060000000 HC ICU INTERMEDIATE R&B

## 2022-01-20 PROCEDURE — 2580000003 HC RX 258: Performed by: INTERNAL MEDICINE

## 2022-01-20 PROCEDURE — 2580000003 HC RX 258: Performed by: HOSPITALIST

## 2022-01-20 RX ORDER — SODIUM CHLORIDE, SODIUM LACTATE, POTASSIUM CHLORIDE, CALCIUM CHLORIDE 600; 310; 30; 20 MG/100ML; MG/100ML; MG/100ML; MG/100ML
INJECTION, SOLUTION INTRAVENOUS CONTINUOUS
Status: DISCONTINUED | OUTPATIENT
Start: 2022-01-20 | End: 2022-01-22

## 2022-01-20 RX ADMIN — BUDESONIDE 500 MCG: 0.5 INHALANT RESPIRATORY (INHALATION) at 06:32

## 2022-01-20 RX ADMIN — SODIUM CHLORIDE, POTASSIUM CHLORIDE, SODIUM LACTATE AND CALCIUM CHLORIDE: 600; 310; 30; 20 INJECTION, SOLUTION INTRAVENOUS at 15:02

## 2022-01-20 RX ADMIN — FOLIC ACID 1 MG: 1 TABLET ORAL at 10:23

## 2022-01-20 RX ADMIN — HEPARIN 300 UNITS: 100 SYRINGE at 21:07

## 2022-01-20 RX ADMIN — IPRATROPIUM BROMIDE AND ALBUTEROL SULFATE 3 ML: .5; 3 SOLUTION RESPIRATORY (INHALATION) at 13:18

## 2022-01-20 RX ADMIN — HEPARIN 100 UNITS: 100 SYRINGE at 12:20

## 2022-01-20 RX ADMIN — DOCUSATE SODIUM 100 MG: 50 LIQUID ORAL at 10:21

## 2022-01-20 RX ADMIN — IPRATROPIUM BROMIDE AND ALBUTEROL SULFATE 3 ML: .5; 3 SOLUTION RESPIRATORY (INHALATION) at 06:32

## 2022-01-20 RX ADMIN — HEPARIN SODIUM 5000 UNITS: 5000 INJECTION INTRAVENOUS; SUBCUTANEOUS at 10:23

## 2022-01-20 RX ADMIN — LEVOTHYROXINE SODIUM 50 MCG: 0.05 TABLET ORAL at 10:23

## 2022-01-20 RX ADMIN — PANTOPRAZOLE SODIUM 40 MG: 40 INJECTION, POWDER, FOR SOLUTION INTRAVENOUS at 10:24

## 2022-01-20 RX ADMIN — METOPROLOL TARTRATE 50 MG: 50 TABLET, FILM COATED ORAL at 10:23

## 2022-01-20 RX ADMIN — ALLOPURINOL 100 MG: 100 TABLET ORAL at 10:23

## 2022-01-20 RX ADMIN — PIPERACILLIN SODIUM AND TAZOBACTAM SODIUM 3375 MG: 3; .375 INJECTION, POWDER, LYOPHILIZED, FOR SOLUTION INTRAVENOUS at 18:45

## 2022-01-20 RX ADMIN — HEPARIN 100 UNITS: 100 SYRINGE at 21:05

## 2022-01-20 RX ADMIN — DOCUSATE SODIUM 100 MG: 50 LIQUID ORAL at 21:14

## 2022-01-20 RX ADMIN — ATORVASTATIN CALCIUM 10 MG: 10 TABLET, FILM COATED ORAL at 10:22

## 2022-01-20 RX ADMIN — SODIUM BICARBONATE: 84 INJECTION, SOLUTION INTRAVENOUS at 06:19

## 2022-01-20 RX ADMIN — PREDNISONE 5 MG: 5 TABLET ORAL at 10:23

## 2022-01-20 RX ADMIN — PIPERACILLIN SODIUM AND TAZOBACTAM SODIUM 3375 MG: 3; .375 INJECTION, POWDER, LYOPHILIZED, FOR SOLUTION INTRAVENOUS at 12:53

## 2022-01-20 RX ADMIN — FLUCONAZOLE 400 MG: 2 INJECTION INTRAVENOUS at 18:00

## 2022-01-20 RX ADMIN — METOPROLOL TARTRATE 50 MG: 50 TABLET, FILM COATED ORAL at 21:06

## 2022-01-20 RX ADMIN — HEPARIN 300 UNITS: 100 SYRINGE at 12:20

## 2022-01-20 RX ADMIN — BUDESONIDE 500 MCG: 0.5 INHALANT RESPIRATORY (INHALATION) at 18:27

## 2022-01-20 RX ADMIN — PANTOPRAZOLE SODIUM 40 MG: 40 INJECTION, POWDER, FOR SOLUTION INTRAVENOUS at 21:06

## 2022-01-20 RX ADMIN — SODIUM CHLORIDE, PRESERVATIVE FREE 10 ML: 5 INJECTION INTRAVENOUS at 21:05

## 2022-01-20 RX ADMIN — DESVENLAFAXINE SUCCINATE 50 MG: 50 TABLET, FILM COATED, EXTENDED RELEASE ORAL at 18:00

## 2022-01-20 RX ADMIN — PIPERACILLIN SODIUM AND TAZOBACTAM SODIUM 3375 MG: 3; .375 INJECTION, POWDER, LYOPHILIZED, FOR SOLUTION INTRAVENOUS at 04:06

## 2022-01-20 RX ADMIN — SODIUM CHLORIDE, PRESERVATIVE FREE 10 ML: 5 INJECTION INTRAVENOUS at 10:29

## 2022-01-20 RX ADMIN — IPRATROPIUM BROMIDE AND ALBUTEROL SULFATE 3 ML: .5; 3 SOLUTION RESPIRATORY (INHALATION) at 18:27

## 2022-01-20 RX ADMIN — ACETAMINOPHEN 650 MG: 325 TABLET ORAL at 10:21

## 2022-01-20 RX ADMIN — SODIUM CHLORIDE, PRESERVATIVE FREE 10 ML: 5 INJECTION INTRAVENOUS at 00:50

## 2022-01-20 RX ADMIN — HEPARIN SODIUM 5000 UNITS: 5000 INJECTION INTRAVENOUS; SUBCUTANEOUS at 21:07

## 2022-01-20 ASSESSMENT — PAIN SCALES - GENERAL
PAINLEVEL_OUTOF10: 4
PAINLEVEL_OUTOF10: 0
PAINLEVEL_OUTOF10: 0

## 2022-01-20 ASSESSMENT — PAIN SCALES - WONG BAKER: WONGBAKER_NUMERICALRESPONSE: 0

## 2022-01-20 NOTE — CARE COORDINATION
1/20/22 1622 CM note: COVID (-) 1/11/22. Continuous bipap FIO2 40%. Pt awaiting tx to [facility]- no bed available. Discharge plan per choice by pts son, Jose Colby, is [facility] at [facility]) [facility]- Per Natalie Méndez they are following but they CAN NOT get C[facility] if needed at dc  2) [facility] is following. [facility] Col[facility] liaison, is also following if SNF is appropriate at dc. PICC placed 1/19. Pt on IV zosyn and diflucan. NG/TF. CM will continue to follow for dc planning.  Electronically signed by Annabel Dooley RN on 1/20/2022 at 4:28 PM

## 2022-01-20 NOTE — PROGRESS NOTES
Pulmonary/Critical Care Progress Note    We are following patient for acute respiratory failure with hypoxia and hypercapnia, hypoventilation, acute mental status change, pneumatosis of the colon, colitis, UTI, history of kidney transplant, home immunosuppression agent, acute on chronic renal failure, hypothyroidism, history of bipolar disorder, COPD. In Summary:  Laney Saucedo is a 73 y.o. female with hx of Kidney TX, , CKD, Bipolar, morbid obesity who presents with pneumatosis of colon, altered mental status         ISSUES:        Acute Hypercapneic Respiratory Failure   COPD  Morbid Obesity  ? Baseline obesity hypoventialtion  On BiPAP  On IPAP  14 (was on 20)     If worsens (drops < 7.2) will need intubation  Continue present management for now    Repeat ABG ordered     AMS  ? Secondary to above   Unclear cause  No real change x 2 days    Pneumatosis of colon, no evidence of bowel obstruction  Colitis  Bowel adhesion  Continue IV fluid. NG tube to low wall intermittent suction. Being followed by GI / Surgery      UTI  History of kidney transplant, on immunosuppressant agents  Acute on chronic renal failure  cefepime/Zyvox/Diflucan/Flagyl per ID. Hypertension  Bipolar disorder  Hypothyroidism. History of     Will follow results    ____________________________________      SUBJECTIVE:  Patient seen and examined in room. She is still very somnolent but arousable.     MEDICATIONS:   lidocaine PF  5 mL IntraDERmal Once    sodium chloride flush  5-40 mL IntraVENous 2 times per day    heparin flush  3 mL IntraVENous 2 times per day    piperacillin-tazobactam  3,375 mg IntraVENous Q8H    ipratropium-albuterol  3 mL Inhalation 4x daily    pantoprazole  40 mg IntraVENous BID    And    sodium chloride (PF)  10 mL IntraVENous BID    fluconazole  400 mg IntraVENous Q24H    metoprolol tartrate  50 mg Oral BID    docusate  100 mg Oral BID    heparin (porcine)  5,000 Units SubCUTAneous BID    predniSONE  5 mg Oral Daily    heparin flush  1 mL IntraVENous 2 times per day    allopurinol  100 mg Oral Daily    budesonide  500 mcg Nebulization BID    desvenlafaxine succinate  50 mg Oral QPM    folic acid  1 mg Oral Daily    levothyroxine  50 mcg Oral Daily    atorvastatin  10 mg Oral Daily    ondansetron  4 mg Oral Once      lactated ringers      sodium chloride      sodium chloride 25 mL (01/16/22 0035)     sodium chloride flush, sodium chloride, heparin flush, perflutren lipid microspheres, morphine, ondansetron **OR** ondansetron, polyethylene glycol, acetaminophen **OR** acetaminophen, sodium chloride flush, sodium chloride, heparin flush      REVIEW OF SYSTEMS:  Unable to obtain due to patient mental status. OBJECTIVE:  Vitals:    01/20/22 1319   BP:    Pulse:    Resp: 21   Temp:    SpO2:      FiO2 : 40 %  O2 Flow Rate (L/min): 5.5 L/min  O2 Device: PAP (positive airway pressure)    PHYSICAL EXAM:  Constitutional: Afebrile  Skin: Warm, dry, no rashes. HEENT: Atraumatic, normocephalic, PERRLA  Neck: Supple, no lymphadenopathy or thyromegaly. No carotid bruit. Cardiovascular: Normal S1 and S2, no murmur. Respiratory: Diminished breathing sound bilaterally. Gastrointestinal: Distended, hypoactive bowel sounds.   Genitourinary: Unremarkable  Extremities: 1+ pitting edema on both lower extremity  Neurological: Unable to assess  Psychological: Unable to assess    LABS:  WBC   Date Value Ref Range Status   01/20/2022 18.5 (H) 4.5 - 11.5 E9/L Final   01/19/2022 21.4 (H) 4.5 - 11.5 E9/L Final   01/19/2022 20.5 (H) 4.5 - 11.5 E9/L Final     Hemoglobin   Date Value Ref Range Status   01/20/2022 10.5 (L) 11.5 - 15.5 g/dL Final   01/19/2022 11.6 11.5 - 15.5 g/dL Final   01/19/2022 11.2 (L) 11.5 - 15.5 g/dL Final     Hematocrit   Date Value Ref Range Status   01/20/2022 36.1 34.0 - 48.0 % Final   01/19/2022 37.3 34.0 - 48.0 % Final   01/19/2022 39.6 34.0 - 48.0 % Final     MCV   Date Value Ref Range Status   01/20/2022 106.2 (H) 80.0 - 99.9 fL Final   01/19/2022 104.8 (H) 80.0 - 99.9 fL Final   01/19/2022 106.5 (H) 80.0 - 99.9 fL Final     Platelets   Date Value Ref Range Status   01/20/2022 113 (L) 130 - 450 E9/L Final   01/19/2022 96 (L) 130 - 450 E9/L Final   01/19/2022 154 130 - 450 E9/L Final     Sodium   Date Value Ref Range Status   01/20/2022 147 (H) 132 - 146 mmol/L Final   01/19/2022 145 132 - 146 mmol/L Final   01/19/2022 144 132 - 146 mmol/L Final     Potassium   Date Value Ref Range Status   01/20/2022 4.2 3.5 - 5.0 mmol/L Final   01/19/2022 5.3 (H) 3.5 - 5.0 mmol/L Final   01/19/2022 8.5 (HH) 3.5 - 5.0 mmol/L Final     Comment:     Specimen is moderately Hemolyzed. Result may be artificially increased. Potassium reflex Magnesium   Date Value Ref Range Status   01/10/2022 5.5 (H) 3.5 - 5.0 mmol/L Final     Comment:     Specimen is moderately Hemolyzed. Result may be artificially increased. 12/17/2019 5.2 (H) 3.5 - 5.0 mmol/L Final     Comment:     Specimen is moderately Hemolyzed. Result may be artificially increased.      Chloride   Date Value Ref Range Status   01/20/2022 113 (H) 98 - 107 mmol/L Final   01/19/2022 118 (H) 98 - 107 mmol/L Final   01/19/2022 114 (H) 98 - 107 mmol/L Final     CO2   Date Value Ref Range Status   01/20/2022 26 22 - 29 mmol/L Final   01/19/2022 14 (L) 22 - 29 mmol/L Final   01/19/2022 20 (L) 22 - 29 mmol/L Final     BUN   Date Value Ref Range Status   01/20/2022 61 (H) 6 - 23 mg/dL Final   01/19/2022 77 (H) 6 - 23 mg/dL Final   01/19/2022 72 (H) 6 - 23 mg/dL Final     CREATININE   Date Value Ref Range Status   01/20/2022 1.6 (H) 0.5 - 1.0 mg/dL Final   01/19/2022 1.7 (H) 0.5 - 1.0 mg/dL Final   01/19/2022 1.8 (H) 0.5 - 1.0 mg/dL Final     Glucose   Date Value Ref Range Status   01/20/2022 112 (H) 74 - 99 mg/dL Final   01/19/2022 110 (H) 74 - 99 mg/dL Final   01/19/2022 132 (H) 74 - 99 mg/dL Final     Calcium   Date Value Ref Range Status   01/20/2022 9.9 8.6 - 10.2 mg/dL Final   01/19/2022 10.7 (H) 8.6 - 10.2 mg/dL Final   01/19/2022 10.0 8.6 - 10.2 mg/dL Final     Total Protein   Date Value Ref Range Status   01/20/2022 4.6 (L) 6.4 - 8.3 g/dL Final   01/19/2022 5.1 (L) 6.4 - 8.3 g/dL Final   01/19/2022 4.9 (L) 6.4 - 8.3 g/dL Final     Albumin   Date Value Ref Range Status   01/20/2022 2.2 (L) 3.5 - 5.2 g/dL Final   01/19/2022 2.3 (L) 3.5 - 5.2 g/dL Final   01/19/2022 2.5 (L) 3.5 - 5.2 g/dL Final     Total Bilirubin   Date Value Ref Range Status   01/20/2022 0.4 0.0 - 1.2 mg/dL Final   01/19/2022 0.4 0.0 - 1.2 mg/dL Final   01/19/2022 0.5 0.0 - 1.2 mg/dL Final     Alkaline Phosphatase   Date Value Ref Range Status   01/20/2022 68 35 - 104 U/L Final   01/19/2022 77 35 - 104 U/L Final     Comment:     Specimen is moderately Hemolyzed. Result may be artificially decreased. 01/19/2022 73 35 - 104 U/L Final     AST   Date Value Ref Range Status   01/20/2022 10 0 - 31 U/L Final   01/19/2022 24 0 - 31 U/L Final     Comment:     Specimen is moderately Hemolyzed. Result may be artificially increased. 01/19/2022 10 0 - 31 U/L Final     ALT   Date Value Ref Range Status   01/20/2022 6 0 - 32 U/L Final   01/19/2022 12 0 - 32 U/L Final     Comment:     Specimen is moderately Hemolyzed. Result may be artificially increased. 01/19/2022 10 0 - 32 U/L Final     GFR Non-   Date Value Ref Range Status   01/20/2022 32 >=60 mL/min/1.73 Final     Comment:     Chronic Kidney Disease: less than 60 ml/min/1.73 sq.m. Kidney Failure: less than 15 ml/min/1.73 sq.m. Results valid for patients 18 years and older. 01/19/2022 30 >=60 mL/min/1.73 Final     Comment:     Chronic Kidney Disease: less than 60 ml/min/1.73 sq.m. Kidney Failure: less than 15 ml/min/1.73 sq.m. Results valid for patients 18 years and older. 01/19/2022 28 >=60 mL/min/1.73 Final     Comment:     Chronic Kidney Disease: less than 60 ml/min/1.73 sq.m.           Kidney Failure: less than 15 ml/min/1.73 sq.m. Results valid for patients 18 years and older. GFR    Date Value Ref Range Status   01/20/2022 39  Final   01/19/2022 36  Final   01/19/2022 34  Final     Magnesium   Date Value Ref Range Status   01/20/2022 2.1 1.6 - 2.6 mg/dL Final   01/19/2022 2.3 1.6 - 2.6 mg/dL Final   01/19/2022 2.2 1.6 - 2.6 mg/dL Final     Phosphorus   Date Value Ref Range Status   01/20/2022 2.2 (L) 2.5 - 4.5 mg/dL Final   01/19/2022 3.1 2.5 - 4.5 mg/dL Final   01/19/2022 3.0 2.5 - 4.5 mg/dL Final     Recent Labs     01/19/22  0130   PH 7.333*   PO2 89.9   PCO2 42.9   HCO3 22.3   BE -3.5*   O2SAT 96.3       RADIOLOGY:  CT ABDOMEN PELVIS WO CONTRAST Additional Contrast? None   Final Result   1. Colitis involving the left colon from splenic flexure to mid to distal   sigmoid colon. 2. Atrophy of native kidneys. Multiple renal cysts and nonobstructing left   renal calculus. Transplant kidney in right iliac fossa. 3. Trace ascites superficial to the liver. 4. Unchanged consolidation in lower lobes. 5. Unchanged cardiomegaly. 6. Possible gallbladder sludge and or cholelithiasis. RECOMMENDATIONS:   Unavailable         XR ABDOMEN (KUB) (SINGLE AP VIEW)   Final Result   No evidence of bowel obstruction. CT CHEST WO CONTRAST   Final Result   1. Bilateral lower lobe alveolar opacities compatible with pneumonia. Increased consolidation in the right lower lobe compared to the prior study. 2.  Trace effusions and trace pericardial effusion with left ventricular   dilatation. 3.  No evidence to suggest empyema. 4.  Gastric catheter passes into the stomach. XR CHEST PORTABLE   Final Result   Infiltrate and or atelectasis left greater than right with mildly improving   aeration in the left mid lung. MRI BRAIN WO CONTRAST   Final Result   No acute intracranial abnormality.          XR ABDOMEN FOR NG/OG/NE TUBE PLACEMENT   Final Result   NG tube in good position. XR CHEST PORTABLE   Final Result   Cardiomegaly. Findings for an infiltrate with some consolidation in the mid lower aspect of   the left lung. CT ABDOMEN PELVIS WO CONTRAST Additional Contrast? Oral   Final Result   There is pneumatosis involving the ascending to transverse colon. There is   wall thickening of the splenic flexure to descending colon. This is   nonspecific but can be seen related to ischemic enteritis but there is no   evidence for portal venous gas,, medication induced pneumatosis in a patient   taking steroids or chemotherapy, procedure related. Clinical correlation   recommended. Surgical consultation may be needed. There is fairly dense bibasilar infiltrates which have progressed from prior   exam.  Correlation with fever and white count and follow-up to resolution   recommended. Multiple mixed density cysts both kidneys related to chronic renal failure   with the transplant right lower quadrant. There is distention of small bowel in the lower pelvis without obstructing   lesion identified. Small hiatal hernia. XR ABDOMEN (KUB) (SINGLE AP VIEW)   Final Result   Abundant gas and stool throughout the colon may represent constipation. XR CHEST PORTABLE   Final Result   1. Stable cardiomegaly. There is no evidence of failure or pneumonia. CT ABDOMEN PELVIS WO CONTRAST Additional Contrast? None   Final Result   No significant fluid within the large bowel. No hydronephrosis of the transplant kidneys. The native kidneys are atrophic   and have multiple intermediate density cysts which should be further   evaluated with nonemergent ultrasound. Atherosclerotic disease.          IR MIDLINE CATH    (Results Pending)           Electronically signed by Federico Caraballo MD on 1/20/2022 at 3:43 PM

## 2022-01-20 NOTE — PROGRESS NOTES
PROGRESS NOTE    By Echo See D.O GI Fellow    [facility]  Dr. Alba Corcoran MD, Dr. Esther Cast MD, Dr Rafael Ocampo, Dr. Garth Cunningham MD, Dr. Zambrano, DO Ronni Dakins  73 y.o.  female    SUBJECTIVE: She was evaluated at bedside this morning however patient continued to remain on BiPAP as well as remain encephalopathic. Patient is responding to minimal tactile stimuli. Patient does not respond to stimuli. Unsure what his baseline mentation. However patient continued to show no signs of overt GI bleed on examination. Hemoglobin this morning is 10.5. No hypotension or tachycardia. OBJECTIVE:    BP (!) 106/55   Pulse 84   Temp 99 °F (37.2 °C) (Axillary)   Resp 16   Ht 5' 2\" (1.575 m)   Wt 216 lb (98 kg)   SpO2 98%   BMI 39.51 kg/m²     Gen: NAD, AAO x 3  HEENT:PEERL, no icterus  Heart: RRR, no M/R/G  Lungs: CTAB  Abd.: soft, NT, ND, BS +, no G/R, no HSM  Extr.: no C/C/E, no bruising         Lab Results   Component Value Date    WBC 18.5 01/20/2022    WBC 21.4 01/19/2022    WBC 20.5 01/19/2022    HGB 10.5 01/20/2022    HGB 11.6 01/19/2022    HGB 11.2 01/19/2022    HCT 36.1 01/20/2022    .2 01/20/2022    RDW 15.9 01/20/2022     01/20/2022    PLT 96 01/19/2022     01/19/2022     Lab Results   Component Value Date     01/20/2022    K 4.2 01/20/2022    K 5.5 01/10/2022     01/20/2022    CO2 26 01/20/2022    BUN 61 01/20/2022    CREATININE 1.6 01/20/2022    CALCIUM 9.9 01/20/2022    PROT 4.6 01/20/2022    LABALBU 2.2 01/20/2022    BILITOT 0.4 01/20/2022    BILITOT 0.4 01/19/2022    BILITOT 0.5 01/19/2022    ALKPHOS 68 01/20/2022    ALKPHOS 77 01/19/2022    ALKPHOS 73 01/19/2022    AST 10 01/20/2022    AST 24 01/19/2022    AST 10 01/19/2022    ALT 6 01/20/2022    ALT 12 01/19/2022    ALT 10 01/19/2022     Lab Results   Component Value Date    LIPASE 19 11/17/2021     No results found for: AMYLASE      ASSESSMENT/PLAN:    1.  Abdominal distention patient is status post recent Diagnostic laparoscopy  -Possibly secondary to body habitus/adynamic ileus secondary to acute illness/recent laparoscopic procedure -cannot exclude Berne's syndrome.  -Patient physical exam was unremarkable. The abdomen was soft and nontender. During my physical exam patient did not exhibit grimace or signs of pain or abnormal reaction on deep palpation.  - No need for emergent endoscopy evaluation at this time. -General surgery on case -input appreciated  -KUB as ordered has not been performed  -Okay for liquid diet from GI POV     2. Chronic macrocytic anemia most likely due to hypothyroidism. Cannot rule out medication/bone marrow suppression.  - Patient does have history of hypothyroidism currently also taking mycophenolate  -H&H at baseline without evidence of overt bleed  -Iron study are within normal limits B12 studies within normal limit.  -Monitor H&H  -Continue PPI. Gastroenterology will follow as needed. As mentioned above no need for endoscopic evaluation at this time.     3. Sepsis  -Per admitting     4.  Respiratory failure  -Patient requiring noninvasive positive pressure ventilation  -Per admitting/pulmonology     5. history of renal transplant  -Per admitting/nephrology     6.  Multiple comorbidities -COPD, HTN, HLD, BPD, COPD, CHF, hypothyroidism, etc.  -Per admitting/pertinent consultants      Pt was discussed with  Dr. Diomedes Ramon DO  GI Fellow   1/20/2022  12:30 PM    Patient seen and independently examined. Pertinent notes and lab work reviewed. Monitor reviewed showing sinus rhythm. Discussed with Dr. Greyson Murcia with physical exam and A&P.     Naomy White MD  1/20/2022  12:46 PM

## 2022-01-20 NOTE — PROGRESS NOTES
Physical Therapy    Physical Therapy Treatment Note/Plan of Care    Room #:  9930/4746-28  Patient Name: Jorge Alberto Bowens  YOB: 1955  MRN: [id number]    Date of Service: 1/20/2022     Tentative placement recommendation: Subacute vs Home Health Physical Therapy if patient meets goals  Equipment recommendation: [address]      Evaluating Physical Therapist: Rhonda Ramos PT  #[id number]      Specific Provider Orders/Date/Referring Provider :  01/11/22 0730   PT eval and treat Start: 01/11/22 0730, End: 01/11/22 0730, ONE TIME, Standing Count: 1 Occurrences, Heike Schaffer MD      Admitting Diagnosis:   CECILY (acute kidney injury) ([address] [N17.9]     Admitted with  Abnormal labs, fatigue , nausea , vomiting   Surgery: none  Visit Diagnoses       Codes    Transplanted kidney     Z94.0          Patient Active Problem List   Diagnosis    Peripheral vascular disease ([address]    Depression    Clotting disorder ([address]    Abnormal EKG    Cancer ([address]    Over weight    S/p cadaver renal transplant    ESRD (end stage renal disease) ([address]    Non morbid obesity due to excess calories    Hypertension    Leg swelling    Lymphedema of both lower extremities    Acute gout involving toe of right foot    Sepsis secondary to UTI ([address]    Acute kidney injury superimposed on CKD ([address]    Thyroid disease    Acute on chronic combined systolic (congestive) and diastolic (congestive) heart failure ([address]    Sepsis ([address]    Acute renal failure ([address]    CECILY (acute kidney injury) ([address]    Ischemic bowel disease ([address]        ASSESSMENT of Current Deficits Patient exhibits decreased strength, balance, endurance and coordination impairing functional mobility, transfers, gait , gait distance and tolerance to activity are barriers to d/c and require skilled intervention during hospital stay to attain pre hospital level of function. Patient confused and on bipap, grimaces with passive ROM.  Patient not able to participate with therapy actively. PHYSICAL THERAPY  PLAN OF CARE       Physical therapy plan of care is established based on physician order,  patient diagnosis and clinical assessment    Current Treatment Recommendations:    -Bed Mobility: Lower extremity exercises  and Trunk control activities   -Standing Balance: Perform strengthening exercises in standing to promote motor control with or without upper extremity support  and Instruct patient on adequate base of support to maintain balance  -Transfers: Provide instruction on proper hand and foot position for adequate transfer of weight onto lower extremities and use of gait device if needed and Cues for hand placement, technique and safety. Provide stabilization to prevent fall   -Gait: Gait training, Standing activities to improve: base of support, weight shift, weight bearing  and Exercises to improve hip and knee control   -Endurance: Utilize Supervised activities to increase level of endurance to allow for safe functional mobility including transfers and gait     PT long term treatment goals are located in below grid    Patient and or family understand(s) diagnosis, prognosis, and plan of care. Frequency of treatments: Patient will be seen  3-5 times/week.          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    Cerebrovascular disease     Clotting disorder ([facility] 1985    thrombophlebitis after c section delivery    Depression     15 years followed by dr Bridget Azul    Hyperlipidemia     Hypertension     Hyperthyroidism  Leg swelling 9/3/2020    Lymphedema of both lower extremities 9/3/2020    Peripheral vascular disease ([address]     Renal failure 2012    renal transplant    Thrombophlebitis     after c section delivery    Thyroid disease      Past Surgical History:   Procedure Laterality Date     SECTION  1985    one normal delivery    COLECTOMY N/A 1/15/2022    DIAGNOSTIC  LAPAROSCOPY LYSIS OF ADHESIONS performed by Cassandra Allen MD at [address] CATH LAB PROCEDURE      normal coronaries and  normal coronaries    DIALYSIS FISTULA CREATION  march and 2012    phase one and two patient will start dialysis when needed   [address]    transfemoral venous bypass grafts    KIDNEY TRANSPLANT  2016    KIDNEY TRANSPLANT  2015   [facility]  2015    PARATHYROIDECTOMY  2006    left parathyroid  implant and parathyroidectomy       SUBJECTIVE:    Precautions: Up with assistance, falls and alarm ,    Social history: Patient lives alone in a apartment    with Elevator  to enter     Tub shower grab bars    Equipment owned: Rollator and Shower chair,       [facility]   How much difficulty turning over in bed?: Unable  How much difficulty sitting down on / standing up from a chair with arms?: Unable  How much difficulty moving from lying on back to sitting on side of bed?: Unable  How much help from another person moving to and from a bed to a chair?: Total  How much help from another person needed to walk in hospital room?: Total  How much help from another person for climbing 3-5 steps with a railing?: Total  AM-PAC Inpatient Mobility Raw Score : 6  AM-PAC Inpatient T-Scale Score : 23.55  Mobility Inpatient CMS 0-100% Score: 100  Mobility Inpatient CMS G-Code Modifier : CN    Nursing cleared patient for PT treatment.  .   OBJECTIVE;   Initial Evaluation  Date: 2022 Treatment Date:  2022       Short Term/ Long Term   Goals   Was pt agreeable to Eval/treatment? Yes yes To be met in 3 days   Pain level   0/10    0/10    Bed Mobility    Rolling: Minimal assist of 1    Supine to sit: Moderate assist of 1    Sit to supine: Moderate assist of 1    Scooting: Minimal assist of 1   Rolling: Not assessed    Supine to sit: Not assessed    Sit to supine: Not assessed    Scooting: Not assessed     Rolling: Independent    Supine to sit: Independent    Sit to supine: Independent    Scooting: Independent     Transfers Sit to stand: Minimal assist of 1   Sit to stand: Not assessed       Sit to stand: Independent     Ambulation    35 feet using  wheeled walker with Moderate assist of 1   for one instance of knee buckle otherwise min a  Walker approximation and safety not assessed     100 feet using  least restrictive device versus no device with Independent    ROM Within functional limits        Strength BUE:  refer to OT eval  RLE:  3+/5  LLE:  3+/5  Increase strength in affected mm groups by 1/3 grade   Balance Sitting EOB:  fair    Dynamic Standing:  fair with wheeled walker  Sitting EOB: not assessed   Dynamic Standing: not assessed    Sitting EOB:  good    Dynamic Standing: good with wheeled walker      Patient is Alert & Oriented x person and does not follow directions    Sensation:  Patient  denies numbness/tingling   Edema:  yes bilateral lower extremities   Endurance: fair          Patient education  Patient educated on role of Physical Therapy, risks of immobility, safety and plan of care,  importance of mobility while in hospital  and importance and purpose of adaptive device and adjusted to proper height for the patient. Patient response to education:   Pt verbalized understanding Pt demonstrated skill Pt requires further education in this area   Yes Partial Yes      Treatment:  Patient practiced and was instructed/facilitated in the following treatment:   Patient confused.  on bipap. performed supine exercises, PROM, placed pillows for skin integrity and edema control. Therapeutic Exercises:  ankle pumps, heel slide, hip abduction/adduction and straight leg raise, x 15 reps. At end of session, patient in bed with  call light and phone within reach,  all lines and tubes intact, nursing notified. Patient would benefit from continued skilled Physical Therapy to improve functional independence and quality of life.          Patient's/ family goals   home    Time in 0912  Time out  0922  Total Treatment Time  10 minutes      CPT codes:  Therapeutic exercises (88407)   10 minutes  1 unit(s)    Dayna Gay #[id number]

## 2022-01-20 NOTE — PROGRESS NOTES
INPATIENT CARDIOLOGY FOLLOW-UP VISIT     Name: Kody Sherwood    Age: 73 y.o. Date of Admission: 1/10/2022  6:12 PM    Date of Service: 1/20/2022    Reason for Consultation: Tachycardia    Chief Complaint   Patient presents with    Fatigue     patient attempted to go to pcp today and didnt have the strength. came here instead. patient states sepsis prior to thanksgiving requiring admission and rehab. Referring Physician: Ysabel Martinez MD    Subjective: Still remains lethargic . Echo shows EF is better than before. Patient has kidney transplant and has baseline renal dysfunction and defer fluid management to nephrology.     Past Medical History:  Past Medical History:   Diagnosis Date    Abnormal EKG     left bundle branch block    Acute gout involving toe of right foot 9/3/2020    Acute gout involving toe of right foot 9/3/2020    Acute on chronic combined systolic (congestive) and diastolic (congestive) heart failure (HCC)     Cancer (HCC)     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    Cerebrovascular disease     Clotting disorder ([address] 1985    thrombophlebitis after c section delivery    Depression     15 years followed by dr Aly Robb Hyperlipidemia     Hypertension     Hyperthyroidism     Leg swelling 9/3/2020    Lymphedema of both lower extremities 9/3/2020    Peripheral vascular disease ([address]     Renal failure 11/2012    renal transplant    Thrombophlebitis     after c section delivery    Thyroid disease        Past Surgical History:  Past Surgical History:   Procedure Laterality Date   [address]    one normal delivery    COLECTOMY N/A 1/15/2022    DIAGNOSTIC  LAPAROSCOPY LYSIS OF ADHESIONS performed by Ignacio Lowe MD at [address] [place] [address] Drive CATH LAB PROCEDURE  2006    normal coronaries and 1996 normal coronaries    DIALYSIS FISTULA CREATION  march and july 2012    phase one and two patient will start Organization Meetings: Not on file    Marital Status: Not on file   Intimate Partner Violence:     Fear of Current or Ex-Partner: Not on file    Emotionally Abused: Not on file    Physically Abused: Not on file    Sexually Abused: Not on file   Housing Stability:     Unable to Pay for Housing in the Last Year: Not on file    Number of [place] in the Last Year: Not on file    Unstable Housing in the Last Year: Not on file       Allergies: Allergies   Allergen Reactions    Macrodantin [Nitrofurantoin Macrocrystal]     Sulfa Antibiotics        Home Medications:  Prior to Admission medications    Medication Sig Start Date End Date Taking? Authorizing Provider   furosemide (LASIX) 20 MG tablet  12/22/21   Historical Provider, MD   lisinopril (PRINIVIL;ZESTRIL) 5 MG tablet take 1 tablet by mouth once daily 12/8/21   Historical Provider, MD   pregabalin (LYRICA) 75 MG capsule  12/26/21   Historical Provider, MD   nystatin (MYCOSTATIN) 297429 UNIT/GM powder Apply 3 times daily.  12/27/21   KATINA Hernandez - CNP   ipratropium-albuterol (DUONEB) 0.5-2.5 (3) MG/3ML SOLN nebulizer solution Inhale 3 mLs into the lungs every 6 hours as needed for Shortness of Breath 11/27/21   Susannah Ramirez MD   budesonide (PULMICORT) 0.5 MG/2ML nebulizer suspension Take 2 mLs by nebulization 2 times daily for 10 days 11/27/21 12/7/21  Erica Levi MD   ondansetron (ZOFRAN-ODT) 4 MG disintegrating tablet Take 1 tablet by mouth every 8 hours as needed for Nausea or Vomiting 11/27/21   Erica Levi MD   allopurinol (ZYLOPRIM) 100 MG tablet Take 100 mg by mouth daily    Historical Provider, MD   Cyanocobalamin (B-12 COMPLIANCE INJECTION) 1000 MCG/ML KIT Inject as directed monthly    Historical Provider, MD   metoprolol succinate (TOPROL XL) 50 MG extended release tablet take 1 tablet by mouth once daily 4/17/20   Anastasiia Jeff MD   desvenlafaxine succinate (PRISTIQ) 50 MG TB24 extended release tablet Take 1 tablet by mouth every evening 12/20/19   Ana Reyna MD   cloZAPine (CLOZARIL) 50 MG tablet Take 50 mg by mouth nightly    Historical Provider, MD   cycloSPORINE (SANDIMMUNE) 100 MG capsule Take 100 mg by mouth 2 times daily    Historical Provider, MD   predniSONE (DELTASONE) 5 MG tablet Take 5 mg by mouth daily    Historical Provider, MD   folic acid (FOLVITE) 1 MG tablet Take 1 mg by mouth daily    Historical Provider, MD   docusate sodium (COLACE) 100 MG capsule Take 100 mg by mouth 2 times daily    Historical Provider, MD   mycophenolate (CELLCEPT) 500 MG tablet Take 1,000 mg by mouth 2 times daily    Historical Provider, MD   simvastatin (ZOCOR) 20 MG tablet Take 20 mg by mouth nightly. Historical Provider, MD   levothyroxine (SYNTHROID) 50 MCG tablet Take 50 mcg by mouth daily.       Historical Provider, MD       Current Medications:  Current Facility-Administered Medications   Medication Dose Route Frequency Provider Last Rate Last Admin    lactated ringers infusion   IntraVENous Continuous Coty Marrero MD        lidocaine PF 1 % injection 5 mL  5 mL IntraDERmal Once Ana Reyna MD        sodium chloride flush 0.9 % injection 5-40 mL  5-40 mL IntraVENous 2 times per day Ana Reyna MD   10 mL at 01/19/22 2302    sodium chloride flush 0.9 % injection 5-40 mL  5-40 mL IntraVENous PRN Susannah Ramirez MD        0.9 % sodium chloride infusion  25 mL IntraVENous PRN Ana Reyna MD        heparin flush 100 UNIT/ML injection 300 Units  3 mL IntraVENous 2 times per day Ana Reyna MD   300 Units at 01/20/22 1220    heparin flush 100 UNIT/ML injection 300 Units  3 mL IntraCATHeter PRN Ana Reyna MD        perflutren lipid microspheres (DEFINITY) injection 1.65 mg  1.5 mL IntraVENous ONCE PRN Rafa Renteria MD        piperacillin-tazobactam (ZOSYN) 3,375 mg in dextrose 5 % 50 mL IVPB extended infusion (mini-bag)  3,375 mg IntraVENous Q8H Rolan Montanez MD 12.5 mL/hr at 01/20/22 1253 3,375 mg at 01/20/22 1253    morphine (PF) injection 2 mg  2 mg IntraVENous Q4H PRN Heidi Burger MD        ipratropium-albuterol (DUONEB) nebulizer solution 3 mL  3 mL Inhalation 4x daily Aj Ramirez MD   3 mL at 01/20/22 1318    pantoprazole (PROTONIX) injection 40 mg  40 mg IntraVENous BID Jasmeet Gonzalez MD   40 mg at 01/20/22 1024    And    sodium chloride (PF) 0.9 % injection 10 mL  10 mL IntraVENous BID Jasmeet Gonzalez MD   10 mL at 01/20/22 1029    fluconazole (DIFLUCAN) in 0.9 % sodium chloride IVPB 400 mg  400 mg IntraVENous Q24H Abi Umanzor  mL/hr at 01/19/22 1751 400 mg at 01/19/22 1751    metoprolol tartrate (LOPRESSOR) tablet 50 mg  50 mg Oral BID Susannah Ramirez MD   50 mg at 01/20/22 1023    docusate (COLACE) 50 MG/5ML liquid 100 mg  100 mg Oral BID Aj Ramirez MD   100 mg at 01/20/22 1021    heparin (porcine) injection 5,000 Units  5,000 Units SubCUTAneous BID Heidi Burger MD   5,000 Units at 01/20/22 1023    ondansetron (ZOFRAN-ODT) disintegrating tablet 4 mg  4 mg Oral Q8H PRN Heidi Burger MD   4 mg at 01/11/22 1806    Or    ondansetron (ZOFRAN) injection 4 mg  4 mg IntraVENous Q6H PRN Heidi Burger MD        polyethylene glycol [facility]) packet 17 g  17 g Oral Daily PRN Heidi Burger MD   17 g at 01/13/22 1611    acetaminophen (TYLENOL) tablet 650 mg  650 mg Oral Q6H PRN Heidi Burger MD   650 mg at 01/20/22 1021    Or    acetaminophen (TYLENOL) suppository 650 mg  650 mg Rectal Q6H PRN Heidi Burger MD        predniSONE (DELTASONE) tablet 5 mg  5 mg Oral Daily Heidi Burger MD   5 mg at 01/20/22 1023    sodium chloride flush 0.9 % injection 5-40 mL  5-40 mL IntraVENous PRN Heidi Burger MD   10 mL at 01/17/22 1601    0.9 % sodium chloride infusion  25 mL IntraVENous PRN Heidi Burger  mL/hr at 01/16/22 0035 25 mL at 01/16/22 0035    heparin flush 100 UNIT/ML injection 100 Units  1 mL IntraVENous 2 times per day Con Rudolph MD   100 Units at 01/20/22 1220    heparin flush 100 UNIT/ML injection 100 Units  1 mL IntraCATHeter PRN Con Rudolph MD        allopurinol (ZYLOPRIM) tablet 100 mg  100 mg Oral Daily Con Rudolph MD   100 mg at 01/20/22 1023    budesonide (PULMICORT) nebulizer suspension 500 mcg  500 mcg Nebulization BID Con Rudolph MD   500 mcg at 01/20/22 5925    desvenlafaxine succinate (PRISTIQ) extended release tablet 50 mg  50 mg Oral QPM Con Rudolph MD   50 mg at 90/89/45 4128    folic acid (FOLVITE) tablet 1 mg  1 mg Oral Daily Con Rudolph MD   1 mg at 01/20/22 1023    levothyroxine (SYNTHROID) tablet 50 mcg  50 mcg Oral Daily Con Rudolph MD   50 mcg at 01/20/22 1023    atorvastatin (LIPITOR) tablet 10 mg  10 mg Oral Daily Con Rudolph MD   10 mg at 01/20/22 1022    ondansetron (ZOFRAN-ODT) disintegrating tablet 4 mg  4 mg Oral Once Con Rudolph MD          lactated ringers      sodium chloride      sodium chloride 25 mL (01/16/22 0035)       Physical Exam:  BP (!) 106/55   Pulse 84   Temp 99 °F (37.2 °C) (Axillary)   Resp 21   Ht 5' 2\" (1.575 m)   Wt 216 lb (98 kg)   SpO2 98%   BMI 39.51 kg/m²   Wt Readings from Last 3 Encounters:   01/19/22 216 lb (98 kg)   11/17/21 227 lb 3.2 oz (103.1 kg)   08/16/21 229 lb (103.9 kg)       Patient in OR    Intake/Output:    Intake/Output Summary (Last 24 hours) at 1/20/2022 1433  Last data filed at 1/20/2022 1433  Gross per 24 hour   Intake --   Output 1300 ml   Net -1300 ml     I/O this shift:  In: -   Out: 350 [Urine:350]    Laboratory Tests:  Recent Labs     01/17/22  1938 01/17/22  1938 01/19/22  0128 01/19/22  0128 01/19/22  0527 01/19/22  0700 01/20/22  0634      < > 144  --  145  --  147*   K 5.2*   < > 5.2*   < > 8.5* 5.3* 4.2   *   < > 114*  --  118*  --  113*   CO2 23   < > 20*  --  14*  --  26   BUN 72*   < > 72*  --  77*  --  61*   CREATININE 2.2*  --  1.8*  --  1.7*  --  1.6*   GLUCOSE 119*   < > 132*  --  110*  --  112*   CALCIUM 10.1   < > 10.0  --  10.7*  --  9.9    < > = values in this interval not displayed. Lab Results   Component Value Date    MG 2.1 01/20/2022    MG 2.3 01/19/2022    MG 2.2 01/19/2022     Recent Labs     01/17/22  1938 01/17/22  1938 01/19/22 0128 01/19/22 0527 01/20/22  0634   ALKPHOS 74  76   < > 73 77 68   ALT 12  13   < > 10 12 6   AST 13  12   < > 10 24 10   PROT 4.8*  4.8*   < > 4.9* 5.1* 4.6*   BILITOT 0.4  0.4   < > 0.5 0.4 0.4   BILIDIR 0.3  --   --   --   --    LABALBU 2.3*  2.3*   < > 2.5* 2.3* 2.2*    < > = values in this interval not displayed. Recent Labs     01/19/22 0128 01/19/22  0128 01/19/22 0527 01/19/22  1740 01/20/22  0634   WBC 20.5*  --  21.4*  --  18.5*   RBC 3.67  --  3.78  --  3.40*   HGB 11.2*  --  11.6  --  10.5*   HCT 39.1   < > 39.6 37.3 36.1   .5*  --  104.8*  --  106.2*   MCH 30.5  --  30.7  --  30.9   MCHC 28.6*  --  29.3*  --  29.1*   RDW 15.6*  --  15.7*  --  15.9*     --  96*  --  113*   MPV 12.7*  --  13.0*  --  12.6*    < > = values in this interval not displayed. No results found for: CKTOTAL, CKMB, CKMBINDEX, TROPONINI  No results for input(s): CKTOTAL, CKMB, CKMBINDEX, TROPHS in the last 72 hours. Lab Results   Component Value Date    INR 1.0 11/20/2021    INR 1.1 11/17/2021    PROTIME 11.5 11/20/2021    PROTIME 12.9 (H) 11/17/2021     Lab Results   Component Value Date    TSH 0.376 11/17/2021    TSH 0.807 12/22/2019     Lab Results   Component Value Date    LABA1C 5.7 (H) 11/17/2021     No results found for: EAG  No results found for: CHOL  No results found for: TRIG  No results found for: HDL  No results found for: LDLCALC, LDLCHOLESTEROL  No results found for: LABVLDL, VLDL  No results found for: CHOLHDLRATIO  Recent Labs     01/18/22  1356   PROBNP 2,701*       Cardiac Tests:    Transthoracic echocardiogram January 19, 2022:  Summary   Mild concentric left ventricular hypertrophy.    There is doppler evidence of stage I diastolic dysfunction. Ejection fraction is visually estimated at 60 to 65%. Normal right ventricular size and function. Mild tricuspid regurgitation. Echocardiogram reviewed: December 2019  Technically sub-optimal images.   Normal left ventricular chamber size.   Mild to moderate global hypokinesis, LVEF estimated about 35-38%. [address] left ventricular concentric hypertrophy noted.   There is doppler evidence of stage I diastolic dysfunction.   Normal left atrial pressure.   Left atrium is of normal size.   Interatrial septum not well visualized but appears intact.   Normal right ventricle structure and function.   No mitral valve prolapse.   Normal aortic root size.   No evidence of pericardial effusion.   Pericardium appears normal.   The inferior vena cava diameter is normal with decreased respiratory   variation.   No intracardiac mass.   Compared to prior study from 2017 - which showed EF 35-38% and mild MR. Stress test reviewed:      Cardiac catheterization reviewed:     CXR reviewed: The ASCVD Risk score (Bita Booker, et al., 2013) failed to calculate for the following reasons:    Cannot find a previous HDL lab    Cannot find a previous total cholesterol lab    ASSESSMENT / PLAN:    1. Pneumatosis of the colon  Status post abdominal surgery. Management per primary service and surgery    2. CECILY (acute kidney injury) ([facility] status post transplanted kidney  I defer management and fluid management to nephrology. 3. Acute hypercapnic respiratory failure in the setting of COPD, morbid obesity possible sleep apnea and recent surgery   Pulmonology following  Obtain chest x-ray  Rule out PE  Try diuresis if okay with nephrology given patient history of renal transplant and underlying kidney dysfunction in the setting of cyclosporine therapy  Echocardiogram yesterday revealed EF of 60 to 65%. Rule out COVID-19 infection.     3.  Sinus tachycardia  Resolved since surgery    4. chronic heart failure with systolic function  Echocardiogram January 19, 2022 shows EF of 60 to 65%  Patient with kidney transplant and underlying baseline kidney dysfunction. I will defer fluid management to nephrology. If nephrology agrees, consider a trial of IV Bumex for short lived diuresis to see if patient oxygen requirement will improve. 5.  Lethargic/change mental status  Consider neurology evaluation and consultation    5. Hypertension  6. bipolar disorder  7. COPD  Optimize management to improve saturation  8. Hypothyroidism  9. Depression  10. Obesity  11. peripheral vascular disease status post femoral bypass in the past  12. chronic left bundle branch block  13. HLD, on a statin  14. thrombocytopenia   15. Obesity  16. Depression  17. Hypothyroidism  18. Hx parathyroidectomy  19. Hx clotting disorder with thrombophlebitis remotely with no Dvt on ultrasound  20. Former smoker       Cardiology will sign off for now. Please call with questions. Thank you for allowing me to participate in your patient's care. Please feel free to contact me if you have any questions or concerns.     Brianne Chan MD  [facility]) Cardiology

## 2022-01-20 NOTE — PROGRESS NOTES
[facility] MD Jorge Canas MD. Juan Buitrago MD   Progress Note    1/20/2022    SUBJECTIVE:   1/13:Seen in her room , lying flat on o2/nc , euvolemic , confused   D/w RN on floor   1/14: Patient was seen, interviewed and examined in her room, overall feeling much better with less confusion today. Discussed the case in detail with the nurse, patient had history of kidney transplant in 2015, has maintained of 2 medications including cyclosporine and MMF. We will check with lab to obtain values both medications random throughout toxicity levels that could be responsible for her confusion at one point or another. 1/15: Patient was seen in her room. Case discussed with her son was informing me that, she was scheduled for expiratory laparotomy for probable obstruction of her bowels as per general surgery. We will follow clinical status closely with surgery or any other intervention needed from nephrology. 1/16: Patient underwent yesterday laparoscopic lysis of adhesions with no other pathology being found as per general surgery, will keep following her renal status closely with BUN today is 36 and creatinine down to 1.5 overall stable  1/17 : sleeping on bipap when seeing her   No LE edema   Cr creeping up   General surgery feel there is no intraabdominal pathology   1/18: Patient sleepy but arousable, input of general surgery appreciated clarifying that there was no pneumatosis abdomen distention thought to be secondary to sepsis the patient is then for different reasons. Patient has history of congestive heart failure for which cardiology is following she is sleeping on BiPAP with no lower extremity edema. Patient is status post transplant on cyclosporine and whole blood was sent for assessing the level.     1/19 : seen  In her room continue to be lethargic comfused   On TF   BP stable   Good UO via hassan     1/20 : seen in her room confused , on BIPAP ,per RN no major change in mental status   Appear euvolemic no LE edema   On renal TF   Per Staff plan to transfer to [facility] . PROBLEM LIST:    Active Problems:    CECILY (acute kidney injury) ([facility]    Ischemic bowel disease ([address]  Resolved Problems:    * No resolved hospital problems.  *       DIET:    Diet NPO Exceptions are: Ice Chips, Sips of Water with Meds  ADULT TUBE FEEDING; Nasogastric; Renal Formula; Continuous; 10; Yes; 10; Q 12 hours; 30; 30; Q 4 hours       Allergies : Macrodantin [nitrofurantoin macrocrystal] and Sulfa antibiotics    Past Medical History:   Diagnosis Date    Abnormal EKG     left bundle branch block    Acute gout involving toe of right foot 9/3/2020    Acute gout involving toe of right foot 9/3/2020    Acute on chronic combined systolic (congestive) and diastolic (congestive) heart failure (HCC)     Cancer (HCC)     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    Cerebrovascular disease     Clotting disorder ([facility] 1985    thrombophlebitis after c section delivery    Depression     15 years followed by dr Ivy Retana    Hyperlipidemia     Hypertension     Hyperthyroidism     Leg swelling 9/3/2020    Lymphedema of both lower extremities 9/3/2020    Peripheral vascular disease ([address]     Renal failure 2012    renal transplant    Thrombophlebitis     after c section delivery    Thyroid disease        Past Surgical History:   Procedure Laterality Date     SECTION  1985    one normal delivery    COLECTOMY N/A 1/15/2022    DIAGNOSTIC  LAPAROSCOPY LYSIS OF ADHESIONS performed by Zoe Soni MD at [address] [place] [address] Drive CATH LAB PROCEDURE      normal coronaries and  normal coronaries    DIALYSIS FISTULA CREATION  march and 2012    phase one and two patient will start dialysis when needed   [address]    transfemoral venous bypass grafts    KIDNEY TRANSPLANT  2016   [facility]  2015    KIDNEY TRANSPLANT  2015    PARATHYROIDECTOMY  2006    left parathyroid  implant and parathyroidectomy       Family History   Problem Relation Age of Onset    Diabetes Mother     Diabetes Father     Dementia Father         reports that she quit smoking about 6 years ago. She has a 20.00 pack-year smoking history. She has never used smokeless tobacco. She reports that she does not drink alcohol and does not use drugs. Review of Systems:   Constitutional: no fevers , no chills , feels ok   Eyes: no eye pain , no itching , no drainage  Ears, nose, mouth, throat, and face: no ear ,nose pain , hearing is ok ,no nasal drainage   Respiratory: no sob ,no cough ,no wheezing . Cardiovascular: no chest pain , no palpitation ,no sob . Gastrointestinal: no nausea, vomiting , constipation , no abdominal pain . Genitourinary:no urinary retention , no burning , dysuria . No polyuria   Hematologic/lymphatic: no bleeding , no cougulation issues . Musculoskeletal:no joint pain , no swelling . Neurological: no headaches ,no weakness , no numbness . Endocrine: no thirst , no weight issues .      MEDS (scheduled):    lidocaine PF  5 mL IntraDERmal Once    sodium chloride flush  5-40 mL IntraVENous 2 times per day    heparin flush  3 mL IntraVENous 2 times per day    piperacillin-tazobactam  3,375 mg IntraVENous Q8H    ipratropium-albuterol  3 mL Inhalation 4x daily    pantoprazole  40 mg IntraVENous BID    And    sodium chloride (PF)  10 mL IntraVENous BID    fluconazole  400 mg IntraVENous Q24H    metoprolol tartrate  50 mg Oral BID    docusate  100 mg Oral BID    heparin (porcine)  5,000 Units SubCUTAneous BID    predniSONE  5 mg Oral Daily    heparin flush  1 mL IntraVENous 2 times per day    allopurinol  100 mg Oral Daily    budesonide  500 mcg Nebulization BID    desvenlafaxine succinate  50 mg Oral QPM    folic acid  1 mg Oral Daily    levothyroxine  50 mcg Oral Daily    atorvastatin  10 mg Oral Daily    ondansetron  4 mg Oral Once       MEDS (infusions):   lactated ringers      sodium chloride      sodium chloride 25 mL (01/16/22 0035)       MEDS (prn):  sodium chloride flush, sodium chloride, heparin flush, perflutren lipid microspheres, morphine, ondansetron **OR** ondansetron, polyethylene glycol, acetaminophen **OR** acetaminophen, sodium chloride flush, sodium chloride, heparin flush    PHYSICAL EXAM:     Patient Vitals for the past 24 hrs:   BP Temp Temp src Pulse Resp SpO2 Height Weight   01/20/22 0812 (!) 106/55 99 °F (37.2 °C) Axillary 84 16 98 % -- --   01/20/22 0632 -- -- -- -- 16 -- -- --   01/20/22 0400 125/65 98.9 °F (37.2 °C) Axillary 85 14 95 % -- --   01/20/22 0221 -- -- -- -- 16 -- -- --   01/19/22 2250 -- -- -- -- 15 -- -- --   01/19/22 2230 115/64 99.1 °F (37.3 °C) Axillary 82 12 95 % -- --   01/19/22 1929 -- -- -- -- 13 -- -- --   01/19/22 1342 -- -- -- -- 19 -- -- --   01/19/22 1112 -- -- -- -- -- -- 5' 2\" (1.575 m) --   01/19/22 1100 -- -- -- -- -- -- -- 216 lb (98 kg)   @      Intake/Output Summary (Last 24 hours) at 1/20/2022 1030  Last data filed at 1/20/2022 5297  Gross per 24 hour   Intake 0 ml   Output 1450 ml   Net -1450 ml         Wt Readings from Last 3 Encounters:   01/19/22 216 lb (98 kg)   11/17/21 227 lb 3.2 oz (103.1 kg)   08/16/21 229 lb (103.9 kg)       Constitutional:  in no acute distress  Oral: mucus membranes moist  Neck: no JVD  Cardiovascular: S1, S2 regular rhythm, no murmur,or rub  Respiratory:  No crackles, no wheeze  Gastrointestinal:  Soft, nontender, nondistended, NABS  Ext: no edema, feet warm  Skin: dry, no rash  Neuro: awake, alert, interactive      DATA:    Recent Labs     01/19/22  0128 01/19/22  0128 01/19/22  0527 01/19/22  1740 01/20/22  0634   WBC 20.5*  --  21.4*  --  18.5*   HGB 11.2*  --  11.6  --  10.5*   HCT 39.1   < > 39.6 37.3 36.1   .5*  --  104.8*  --  106.2*     --  96*  --  113*    < > = values in this interval not displayed.      Recent Labs 01/17/22 1938 01/17/22 1938 01/19/22  0128 01/19/22  0128 01/19/22  0527 01/19/22  0700 01/20/22  0634      < > 144  --  145  --  147*   K 5.2*   < > 5.2*   < > 8.5* 5.3* 4.2   *   < > 114*  --  118*  --  113*   CO2 23   < > 20*  --  14*  --  26   MG 2.3   < > 2.2  --  2.3  --  2.1   PHOS 4.4   < > 3.0  --  3.1  --  2.2*   BUN 72*   < > 72*  --  77*  --  61*   CREATININE 2.2*   < > 1.8*  --  1.7*  --  1.6*   ALT 12  13   < > 10  --  12  --  6   AST 13  12   < > 10  --  24  --  10   BILIDIR 0.3  --   --   --   --   --   --    BILITOT 0.4  0.4   < > 0.5  --  0.4  --  0.4   ALKPHOS 74  76   < > 73  --  77  --  68    < > = values in this interval not displayed. No results found for: LABPROT    ASSESSMENT / RECOMMENDATIONS:      1. Acute kidney injury, felt to be related to volume depletion and  consists of diarrhea and poor p.o. intake. CECILY resolving with BUN today of 72 and creatinine down to 2.2.  2.  Chronic kidney disease, stage III. The patient with kidney  transplant. Baseline creatinine 1.5.  3. Immunosuppressant host, chronically on cyclosporin 100 mg b.i.d.,  CellCept 1000 mg b.i.d., and prednisone 5 mg. 4.  Sepsis. Need to rule out a C. diff. infection as the patient is  reporting diarrhea. 5.  Obesity.   6.  Hypotension.     PLAN:    Cr down to 1.6 close to baseline   Acidosis resolved     -change iv fluids to LR at 60 cc/hr   -bmp in am     Electronically signed by Joleen Goodrich MD on 1/20/2022 at 10:30 AM

## 2022-01-20 NOTE — PROGRESS NOTES
Progress Note  Date:2022       Room:15/0515-02  Patient Monica Nicholas     YOB: 1955     Age:66 y.o. Subjective    Subjective:  Symptoms:  Stable. (Pt very lethargic  , unresponsive , just moans ). Diet:  NPO (unable to eat due to being groggy , NG tube in place ). Activity level: Impaired due to weakness. Pain:  She reports no pain. Review of Systems  Objective         Vitals Last 24 Hours:  TEMPERATURE:  Temp  Av.3 °F (36.8 °C)  Min: 97.8 °F (36.6 °C)  Max: 98.6 °F (37 °C)  RESPIRATIONS RANGE: Resp  Av.6  Min: 13  Max: 26  PULSE OXIMETRY RANGE: SpO2  Av.3 %  Min: 94 %  Max: 98 %  PULSE RANGE: Pulse  Av  Min: 80  Max: 87  BLOOD PRESSURE RANGE: Systolic (82VAG), MJC:899 , Min:109 , PII:079   ; Diastolic (29YUY), FGT:41, Min:57, Max:70    I/O (24Hr): Intake/Output Summary (Last 24 hours) at 2022 1950  Last data filed at 2022 1334  Gross per 24 hour   Intake 0 ml   Output 1050 ml   Net -1050 ml     Objective:  General Appearance: In no acute distress (drowzy and very lethargic  ,on bipap). Vital signs: (most recent): Blood pressure 114/62, pulse 87, temperature 98.6 °F (37 °C), temperature source Axillary, resp. rate 13, height 5' 2\" (1.575 m), weight 216 lb (98 kg), SpO2 94 %. Vital signs are normal.    Output: Producing urine. HEENT: Normal HEENT exam.    Lungs: There are decreased breath sounds. Heart: Normal rate. Regular rhythm. Positive for murmur. Abdomen: Abdomen is soft and distended. (Obese ). Hypoactive bowel sounds. (Incisions from laparoscopy , glued and clean ). Extremities: (Edema trace  bilateral both LL and +1 both  UL)  Neurological: (Very drowzy/ unresponsive ). Pupils:  Pupils are equal, round, and reactive to light.       Labs/Imaging/Diagnostics    Labs:  CBC:  Recent Labs     22  1938 22  1938 22  0128 22  0527 22  1740   WBC 21.2*  --  20.5* 21.4*  -- RBC 3.40*  --  3.67 3.78  --    HGB 10.5*  --  11.2* 11.6  --    HCT 37.1   < > 39.1 39.6 37.3   .1*  --  106.5* 104.8*  --    RDW 15.9*  --  15.6* 15.7*  --      --  154 96*  --     < > = values in this interval not displayed. CHEMISTRIES:  Recent Labs     01/17/22 1938 01/17/22 1938 01/19/22 0128 01/19/22  0527 01/19/22  0700     --  144 145  --    K 5.2*   < > 5.2* 8.5* 5.3*   *  --  114* 118*  --    CO2 23  --  20* 14*  --    BUN 72*  --  72* 77*  --    CREATININE 2.2*  --  1.8* 1.7*  --    GLUCOSE 119*  --  132* 110*  --    PHOS 4.4  --  3.0 3.1  --    MG 2.3  --  2.2 2.3  --     < > = values in this interval not displayed. PT/INR:No results for input(s): PROTIME, INR in the last 72 hours. APTT:No results for input(s): APTT in the last 72 hours. LIVER PROFILE:  Recent Labs     01/17/22 1938 01/19/22 0128 01/19/22  0527   AST 13  12 10 24   ALT 12  13 10 12   BILIDIR 0.3  --   --    BILITOT 0.4  0.4 0.5 0.4   ALKPHOS 74  76 73 77       Imaging Last 24 Hours:  CT ABDOMEN PELVIS WO CONTRAST Additional Contrast? None    Result Date: 1/10/2022  EXAMINATION: CT OF THE ABDOMEN AND PELVIS WITHOUT CONTRAST 1/10/2022 6:56 pm TECHNIQUE: CT of the abdomen and pelvis was performed without the administration of intravenous contrast. Multiplanar reformatted images are provided for review. Dose modulation, iterative reconstruction, and/or weight based adjustment of the mA/kV was utilized to reduce the radiation dose to as low as reasonably achievable. COMPARISON: CT abdomen pelvis 11/17/2021 HISTORY: ORDERING SYSTEM PROVIDED HISTORY: evaluate for diarrhea TECHNOLOGIST PROVIDED HISTORY: Reason for exam:->evaluate for diarrhea Additional Contrast?->None Decision Support Exception - unselect if not a suspected or confirmed emergency medical condition->Emergency Medical Condition (MA) FINDINGS: Lower Chest: Atherosclerotic disease. Atrophic kidneys with multiple cysts.   Some of the presumed cysts appear hyperdense. Subcentimeter foci in the kidneys are too small to accurately characterize. There is a transplant kidney in the right lower quadrant. There is no hydronephrosis of the transplant kidney. Decreased perinephric stranding above the transplant kidney. Unremarkable bladder. Unremarkable appearance of the reproductive organs. No abdominal aortic aneurysm. Atherosclerotic disease. No evidence of cholecystitis or pancreatitis. No free air or significant free fluid. No evidence of bowel obstruction. Nondilated appendix. No significant fluid within the large bowel. Small fat containing umbilical hernia. Mild degenerative changes of the spine. Presumed surgical scars in the anterior abdominal wall. No significant fluid within the large bowel. No hydronephrosis of the transplant kidneys. The native kidneys are atrophic and have multiple intermediate density cysts which should be further evaluated with nonemergent ultrasound. Atherosclerotic disease. XR CHEST PORTABLE    Result Date: 1/11/2022  EXAMINATION: ONE XRAY VIEW OF THE CHEST 1/11/2022 9:11 am COMPARISON: None. HISTORY: ORDERING SYSTEM PROVIDED HISTORY: shortness of breath TECHNOLOGIST PROVIDED HISTORY: Reason for exam:->shortness of breath FINDINGS: The heart is enlarged. There are no findings of failure. The lungs are clear. There is no focal infiltrate or effusion. 1. Stable cardiomegaly. There is no evidence of failure or pneumonia. Assessment//Plan           Hospital Problems           Last Modified POA    CECILY (acute kidney injury) ([facility] [address] 1/11/2022 Yes    Ischemic bowel disease ([facility] [address] 1/15/2022 Yes        Assessment:    Condition: In stable condition. Improving.    (Sepsis immunocompromised host ,   Still on Cefepime and vanc  X 1 dose afebrile now on zyvox , and flagyl , ct scan abdomen , inflammation in the descending and sigmoid colon s/p laparoscopy by dr Dequan Devine , no sign of ischemic bowel  Urine enterococcus fecalis on zyvox ,   Blood culture negative  ,  resp film array negative   Bilateral LL pneumonia  On cefepime , but still  Very lethargic , ABG metaolic and resp acidosis , continue bipap , on HCO3 drip per renal   Ct scan of the abdomen ? Enteritis , colonic distention no ischemic bowel ,pneumatosis , was taken by dr Jonathan Sellers 1/15  possible ischemic bowel  Laparoscopy , but was clear .,   Ileus due to sepsis   Will start TF very small dose  Today   Discussed with her son Rachel Tavera on the phone ,     Somnolence , and delirium ? Sepsis  Bilateral  pneumonia also on bipap , on cefepime     Leukocytosis secondary to above ,      Acute on chronic renal failure , renal function better with hydration , d/c IV fluids creatinine upon admission was 2.5 , better , labs today  Better 1.7   S/p renal transplant in the past ,     HTN bp optimal  On metorpolol    Copd on aerosol treatment as needed     Bipolar controlled on  pristiq , hold clozaril    Hypothyroidism continue synthroid,     ? Viral meningitis , discussed with dr Mohan Dudley ? Herpes simplex   Prognosis poor      2 d echo done today good EF 60-65 %      await transfer to [facility]     Poor prognosis       ).        Electronically signed by Juan Mesa MD on 1/12/22 at 6:29 PM EST

## 2022-01-20 NOTE — PROGRESS NOTES
GENERAL SURGERY  DAILY PROGRESS NOTE  1/20/2022    Chief Complaint   Patient presents with    Fatigue     patient attempted to go to pcp today and didnt have the strength. came here instead. patient states sepsis prior to thanksgiving requiring admission and rehab. Subjective:  Pt on BiPAP and lethargic. NG tube to LIWS with low bilious output. Objective:  /65   Pulse 85   Temp 98.9 °F (37.2 °C) (Axillary)   Resp 14   Ht 5' 2\" (1.575 m)   Wt 216 lb (98 kg)   SpO2 95%   BMI 39.51 kg/m²     GENERAL:  Laying in bed, lethargic, no apparent distress  HEAD: Normocephalic, atraumatic  EYES: No sclera icterus, pupils equal  LUNGS:  On BiPAP  CARDIOVASCULAR:  RR and normotensive  ABDOMEN:  Soft, incisions c/d/i, appropriately TTP around incisions, non-distended  EXTREMITIES: No edema or swelling  SKIN: Warm and dry    Assessment/Plan:  73 y.o. female with sepsis and reported pneumatosis of the colon on CT A/P s/p diagnostic laparoscopy 1/15 which was negative. Now with colitis.    - Okay for TFs from surgical POV  - No signs of intra-abdominal causes of sepsis upon diagnostic laparoscopy  - Continue abx  - Monitor abdominal exam  - Pneumatosis of the colon may be present but is benign as there was no necrotic or ischemic bowel  - Awaiting transfer to [facility]    Electronically signed by Janessa Eden MD on 1/20/2022 at 5:27 AM     As above. Stable from surgery standpoint  Awaiting transfer.

## 2022-01-20 NOTE — PROGRESS NOTES
[address] [facility]    [address] [place] [address]  [place], [id number] [facility]  Phone [phone]   [phone]      Admit Date: 1/10/2022  6:12 PM  Pt Name: Merlyn Lombardo  MRN: [id number]  : 1955  Reason for Consult:    Chief Complaint   Patient presents with    Fatigue     patient attempted to go to pcp today and didnt have the strength. came here instead. patient states sepsis prior to thanksgiving requiring admission and rehab. Requesting Physician:  Elgin Kerr MD  PCP: Elgin Kerr MD  History Obtained From:  patient, chart   ID consulted for CECILY (acute kidney injury) ([facility] [address] [N17.9]  on hospital day [address]       Chief Complaint   Patient presents with    Fatigue     patient attempted to go to pcp today and didnt have the strength. came here instead. patient states sepsis prior to thanksgiving requiring admission and rehab. HISTORYOF PRESENT ILLNESS   eMrlyn Lombardo is a 73 y.o. female who presents with   has a past medical history of Abnormal EKG, Acute gout involving toe of right foot, Acute gout involving toe of right foot, Acute on chronic combined systolic (congestive) and diastolic (congestive) heart failure ([address] Cancer ([address] Cerebrovascular disease, Clotting disorder ([address] Depression, Hyperlipidemia, Hypertension, Hyperthyroidism, Leg swelling, Lymphedema of both lower extremities, Peripheral vascular disease ([address] Renal failure, Thrombophlebitis, and Thyroid disease. Fatigue  Associated symptoms include fatigue.      PT FOLLOWED UP WITH ID ON  S/P ATBX  STARTED ON FLUCONAZOLE/MYCOSTSATIN POWDER FOR INTERTRIGO   PT COMES IN NOW WITH FATIGUE DEC APPETITE NAUSEA   TMAX99.4 ON RA  CR2.5 WBC17.9  COVID NEG   BLOOD CX PENDING   ATBX was initiated   Not interactive  denies f/c  Just fatigue    Dos  22   PT IS SUPINE BIPAP NGT NOT STAYING AWAKE OVERALL EDEMA   T99 FIO2 40%  CR1.6 BHT32.5 EEF010    1/19/2022  Afebrile bipap40%  Cr1.7 wbc21.4 plt96  Spoke with Dr Biggs Case  1/18/2022  bipap still lethargic  ngt  11/7/2022  BIPAP LETHARGIC DOES RESPONDS HAS NGT  1/16/2022   ptis in bed supine 6L afebrile   Barely wakes up   Wbc20.2 cr1.5    Urine cx E faecalis   Blood cx ngtd   S/p PROCEDURE:  Diagnostic laparoscopy with lysis of adhesions. No signs of ischemia. Intra-abdominal  adhesions. Minimal intra-abdominal fluid. No other signs of  intra-abdominal pathology.     REVIEW OF SYSTEMS     CONSTITUTIONAL:   as in hpi     CURRENT MEDICATIONS     Current Facility-Administered Medications:     lidocaine PF 1 % injection 5 mL, 5 mL, IntraDERmal, Once, Nima Gross MD    sodium chloride flush 0.9 % injection 5-40 mL, 5-40 mL, IntraVENous, 2 times per day, Nima Gross MD, 10 mL at 01/19/22 2302    sodium chloride flush 0.9 % injection 5-40 mL, 5-40 mL, IntraVENous, PRN, Bryan Ramirez MD    0.9 % sodium chloride infusion, 25 mL, IntraVENous, PRN, Bryan Ramirez MD    heparin flush 100 UNIT/ML injection 300 Units, 3 mL, IntraVENous, 2 times per day, Nima Gross MD, 300 Units at 01/19/22 2320    heparin flush 100 UNIT/ML injection 300 Units, 3 mL, IntraCATHeter, PRN, Bryan Ramirez MD    perflutren lipid microspheres (DEFINITY) injection 1.65 mg, 1.5 mL, IntraVENous, ONCE PRN, Janice Renteria MD    sodium bicarbonate 150 mEq in dextrose 5 % 1,000 mL infusion, , IntraVENous, Continuous, Alyson Pantoja MD, Last Rate: 100 mL/hr at 01/20/22 0619, New Bag at 01/20/22 0619    piperacillin-tazobactam (ZOSYN) 3,375 mg in dextrose 5 % 50 mL IVPB extended infusion (mini-bag), 3,375 mg, IntraVENous, Q8H, Joe Lucas MD, Stopped at 01/20/22 7532    morphine (PF) injection 2 mg, 2 mg, IntraVENous, Q4H PRN, Ysabel Cota MD    ipratropium-albuterol (DUONEB) nebulizer solution 3 mL, 3 mL, Inhalation, 4x daily, Nima Gross MD, 3 mL at 01/20/22 6887    pantoprazole (PROTONIX) injection 40 mg, 40 mg, IntraVENous, BID, 40 mg at 01/19/22 2245 **AND** sodium chloride (PF) 0.9 % injection 10 mL, 10 mL, IntraVENous, BID, Sourav Kraus MD, 10 mL at 01/20/22 0050    fluconazole (DIFLUCAN) in 0.9 % sodium chloride IVPB 400 mg, 400 mg, IntraVENous, Q24H, Joe Lucas MD, Last Rate: 100 mL/hr at 01/19/22 1751, 400 mg at 01/19/22 1751    metoprolol tartrate (LOPRESSOR) tablet 50 mg, 50 mg, Oral, BID, Susannah Ramirez MD, 50 mg at 01/19/22 2245    docusate (COLACE) 50 MG/5ML liquid 100 mg, 100 mg, Oral, BID, Susannah Ramirez MD, 100 mg at 01/19/22 2245    heparin (porcine) injection 5,000 Units, 5,000 Units, SubCUTAneous, BID, Ysabel Cota MD, 5,000 Units at 01/19/22 2245    ondansetron (ZOFRAN-ODT) disintegrating tablet 4 mg, 4 mg, Oral, Q8H PRN, 4 mg at 01/11/22 1806 **OR** ondansetron (ZOFRAN) injection 4 mg, 4 mg, IntraVENous, Q6H PRN, Ysabel Cota MD    polyethylene glycol [facility]) packet 17 g, 17 g, Oral, Daily PRN, Ysabel Cota MD, 17 g at 01/13/22 1611    acetaminophen (TYLENOL) tablet 650 mg, 650 mg, Oral, Q6H PRN, 650 mg at 01/19/22 0923 **OR** acetaminophen (TYLENOL) suppository 650 mg, 650 mg, Rectal, Q6H PRN, Ysabel Cota MD    predniSONE (DELTASONE) tablet 5 mg, 5 mg, Oral, Daily, Ysabel Cota MD, 5 mg at 01/19/22 7926    sodium chloride flush 0.9 % injection 5-40 mL, 5-40 mL, IntraVENous, PRN, Ysabel Cota MD, 10 mL at 01/17/22 1601    0.9 % sodium chloride infusion, 25 mL, IntraVENous, PRN, Ysabel Cota MD, Last Rate: 100 mL/hr at 01/16/22 0035, 25 mL at 01/16/22 0035    heparin flush 100 UNIT/ML injection 100 Units, 1 mL, IntraVENous, 2 times per day, Ysabel Cota MD, 100 Units at 01/19/22 2246    heparin flush 100 UNIT/ML injection 100 Units, 1 mL, IntraCATHeter, PRN, Ysabel Cota MD    allopurinol (ZYLOPRIM) tablet 100 mg, 100 mg, Oral, Daily, Ysabel Cota MD, 100 mg at 01/19/22 0923    budesonide (PULMICORT) nebulizer suspension 500 mcg, 500 mcg, Nebulization, BID, Starla Cullen MD, 500 mcg at 01/20/22 3707    desvenlafaxine succinate (PRISTIQ) extended release tablet 50 mg, 50 mg, Oral, QPM, Starla Cullen MD, 50 mg at 16/20/12 6814    folic acid (FOLVITE) tablet 1 mg, 1 mg, Oral, Daily, Starla Cullen MD, 1 mg at 01/19/22 0266    levothyroxine (SYNTHROID) tablet 50 mcg, 50 mcg, Oral, Daily, Starla Cullen MD, 50 mcg at 01/19/22 2701    atorvastatin (LIPITOR) tablet 10 mg, 10 mg, Oral, Daily, Starla Cullen MD, 10 mg at 01/19/22 1269    ondansetron (ZOFRAN-ODT) disintegrating tablet 4 mg, 4 mg, Oral, Once, Starla Cullen MD  ALLERGIES     Macrodantin [nitrofurantoin macrocrystal] and Sulfa antibiotics  Immunization History   Administered Date(s) Administered    COVID-19, Pfizer Purple top, DILUTE for use, 12+ yrs, 30mcg/0.3mL dose 01/28/2021, 02/18/2021, 08/18/2021      Internal Administration   First Dose COVID-19, Pfizer Purple top, DILUTE for use, 12+ yrs, 30mcg/0.3mL dose  01/28/2021   Second Dose COVID-19, Pfizer Purple top, DILUTE for use, 12+ yrs, 30mcg/0.3mL dose   02/18/2021       Last COVID Lab SARS-CoV-2 (no units)   Date Value   01/20/2021 Not Detected     SARS-CoV-2 Antibody, Total (no units)   Date Value   01/13/2022 Non-Reactive     SARS-CoV-2, PCR (no units)   Date Value   01/11/2022 Not Detected     SARS-CoV-2, NAAT (no units)   Date Value   01/10/2022 Not Detected            PAST MEDICAL HISTORY     Past Medical History:   Diagnosis Date    Abnormal EKG     left bundle branch block    Acute gout involving toe of right foot 9/3/2020    Acute gout involving toe of right foot 9/3/2020    Acute on chronic combined systolic (congestive) and diastolic (congestive) heart failure (HCC)     Cancer ([facility] [address]     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    Cerebrovascular disease     Clotting disorder ([facility] [address] 1985    thrombophlebitis after c section delivery    Depression     15 years followed by dr Vincent Barnes Hyperlipidemia     Hypertension     Hyperthyroidism     Leg swelling 9/3/2020    Lymphedema of both lower extremities 9/3/2020    Peripheral vascular disease ([address]     Renal failure 11/2012    renal transplant    Thrombophlebitis     after c section delivery    Thyroid disease      SURGICAL HISTORY       Past Surgical History:   Procedure Laterality Date   [address]    one normal delivery    COLECTOMY N/A 1/15/2022    DIAGNOSTIC  LAPAROSCOPY LYSIS OF ADHESIONS performed by Pratik Georges MD at [address] [facility] [address] CATH LAB PROCEDURE  2006    normal coronaries and 1996 normal coronaries    DIALYSIS FISTULA CREATION  march and july 2012    phase one and two patient will start dialysis when needed   [address]    transfemoral venous bypass grafts    KIDNEY TRANSPLANT  2016    KIDNEY TRANSPLANT  2015    KIDNEY TRANSPLANT  2015    PARATHYROIDECTOMY  2006    left parathyroid  implant and parathyroidectomy     PHYSICAL EXAM       Vitals:    01/20/22 0221 01/20/22 0400 01/20/22 0632 01/20/22 0812   BP:  125/65  (!) 106/55   Pulse:  85  84   Resp: 16 14 16 16   Temp:  98.9 °F (37.2 °C)  99 °F (37.2 °C)   TempSrc:  Axillary  Axillary   SpO2:  95%  98%   Weight:       Height:         Physical Exam EXAM UNCHANGED   Constitutional/General:   IN BED  Head: NC/AT  Pulmonary: Lungs  DEc   to auscultation bilaterally ant .   on  BIPAP  Cardiovascular:  Regular rate and rhythm   Abdomen: Soft, dec  BS. distension.  NT   NGT  Extremities:     OVERALL Edema  Skin: Warm and dry   Neurologic:    lethargic   RIGHT MIDLINE  MERCHANT      DIAGNOSTIC RESULTS   RADIOLOGY:   CT ABDOMEN PELVIS WO CONTRAST Additional Contrast? None    Result Date: 1/10/2022  EXAMINATION: CT OF THE ABDOMEN AND PELVIS WITHOUT CONTRAST 1/10/2022 6:56 pm TECHNIQUE: CT of the abdomen and pelvis was performed without the administration of intravenous contrast. Multiplanar reformatted images are provided for review. Dose modulation, iterative reconstruction, and/or weight based adjustment of the mA/kV was utilized to reduce the radiation dose to as low as reasonably achievable. COMPARISON: CT abdomen pelvis 11/17/2021 HISTORY: ORDERING SYSTEM PROVIDED HISTORY: evaluate for diarrhea TECHNOLOGIST PROVIDED HISTORY: Reason for exam:->evaluate for diarrhea Additional Contrast?->None Decision Support Exception - unselect if not a suspected or confirmed emergency medical condition->Emergency Medical Condition (MA) FINDINGS: Lower Chest: Atherosclerotic disease. Atrophic kidneys with multiple cysts. Some of the presumed cysts appear hyperdense. Subcentimeter foci in the kidneys are too small to accurately characterize. There is a transplant kidney in the right lower quadrant. There is no hydronephrosis of the transplant kidney. Decreased perinephric stranding above the transplant kidney. Unremarkable bladder. Unremarkable appearance of the reproductive organs. No abdominal aortic aneurysm. Atherosclerotic disease. No evidence of cholecystitis or pancreatitis. No free air or significant free fluid. No evidence of bowel obstruction. Nondilated appendix. No significant fluid within the large bowel. Small fat containing umbilical hernia. Mild degenerative changes of the spine. Presumed surgical scars in the anterior abdominal wall. No significant fluid within the large bowel. No hydronephrosis of the transplant kidneys. The native kidneys are atrophic and have multiple intermediate density cysts which should be further evaluated with nonemergent ultrasound. Atherosclerotic disease. XR CHEST PORTABLE    Result Date: 1/11/2022  EXAMINATION: ONE XRAY VIEW OF THE CHEST 1/11/2022 9:11 am COMPARISON: None. HISTORY: ORDERING SYSTEM PROVIDED HISTORY: shortness of breath TECHNOLOGIST PROVIDED HISTORY: Reason for exam:->shortness of breath FINDINGS: The heart is enlarged.   There are no findings of failure. The lungs are clear. There is no focal infiltrate or effusion. 1. Stable cardiomegaly. There is no evidence of failure or pneumonia. LABS  Recent Labs     01/19/22 0128 01/19/22  0128 01/19/22  0527 01/19/22  1740 01/20/22  0634   WBC 20.5*  --  21.4*  --  18.5*   HGB 11.2*  --  11.6  --  10.5*   HCT 39.1   < > 39.6 37.3 36.1   .5*  --  104.8*  --  106.2*     --  96*  --  113*    < > = values in this interval not displayed. Recent Labs     01/19/22 0128 01/19/22  0128 01/19/22  0527 01/19/22  0700 01/20/22  0634     --  145  --  147*   K 5.2*   < > 8.5*   < > 4.2   *   < > 118*   < > 113*   CO2 20*   < > 14*   < > 26   BUN 72*  --  77*  --  61*   CREATININE 1.8*  --  1.7*  --  1.6*   GFRAA 34  --  36  --  39   LABGLOM 28  --  30  --  32   GLUCOSE 132*  --  110*  --  112*   PROT 4.9*  --  5.1*  --  4.6*   LABALBU 2.5*  --  2.3*  --  2.2*   CALCIUM 10.0  --  10.7*  --  9.9   BILITOT 0.5  --  0.4  --  0.4   ALKPHOS 73  --  77  --  68   AST 10  --  24  --  10   ALT 10  --  12  --  6    < > = values in this interval not displayed. No results for input(s): PROCAL in the last 72 hours.   Lab Results   Component Value Date    CRP 34.6 (H) 11/17/2021     Lab Results   Component Value Date    SEDRATE 62 (H) 11/20/2021     No results found for: [id number]  Lab Results   Component Value Date    COVID19 Non-Reactive 01/13/2022    COVID19 Not Detected 01/11/2022     COVID-19/MARINA-COV2 LABS  Recent Labs     01/19/22 0128 01/19/22  0527 01/20/22  0634   FERRITIN 1,117  --   --    AST 10 24 10   ALT 10 12 6           MICROBIOLOGY:  Lab Results   Component Value Date    BC 5 Days no growth 01/11/2022    BC 5 Days no growth 11/17/2021    BC  12/17/2019     Refer to previous culture of CXBLD from 12-17-19 @1120 for  susceptibility results      ORG Enterococcus faecalis 01/12/2022    ORG Klebsiella pneumoniae ssp pneumoniae 11/17/2021    ORG Staphylococcus coagulase-negative 11/17/2021    ORG Klebsiella pneumoniae ssp pneumoniae 11/17/2021     Lab Results   Component Value Date    BLOODCULT2 5 Days no growth 01/12/2022    BLOODCULT2  11/17/2021     This organism was isolated in one set. Susceptibility testing is not routinely done as this  organism frequently represents skin contamination. Additional testing can be ordered by calling the  Microbiology Department. ORG Enterococcus faecalis 01/12/2022    ORG Klebsiella pneumoniae ssp pneumoniae 11/17/2021    ORG Staphylococcus coagulase-negative 11/17/2021    ORG Klebsiella pneumoniae ssp pneumoniae 11/17/2021        Urine Culture, Routine   Date Value Ref Range Status   01/12/2022 >100,000 CFU/ml  Final   11/17/2021 <10,000 CFU/mL  Mixed gram positive organisms   (A)  Final   11/17/2021 >100,000 CFU/ml  Final     MRSA Culture Only   Date Value Ref Range Status   11/17/2021 Methicillin resistant Staph aureus not isolated  Final          FINAL IMPRESSION    Patient is a 73 y.o. female who presented with   Chief Complaint   Patient presents with    Fatigue     patient attempted to go to pcp today and didnt have the strength. came here instead. patient states sepsis prior to thanksgiving requiring admission and rehab.    and admitted for CECILY (acute kidney injury) ([facility] [address] [N17.9]   IMMUNOCOMPROMISED PT RENAL TRANSPLANT  LEUKOCYTOSIS  pneumatosis involving the ascending to transverse colon. S/p BOWEL RESECTION LAPAROSCOPIC  Lysis of adhesions  VSEnterococcus faecalis   uti    ?  HCAP   CECILY    FUNGITELL +     S/P RX KLEBSIELLA/CONS BACTEREMIA  S/P RX E COLI UTI        piperacillin-tazobactam (ZOSYN) 3,375 mg in dextrose 5 % 50 mL IVPB extended infusion (mini-bag), Q8H     fluconazole (DIFLUCAN) in 0.9 % sodium chloride IVPB 400 mg, Q24H     predniSONE (DELTASONE) tablet 5 mg, Daily       FOLLOW LABS WATCH CR     Imaging and labs were reviewed per medical records       Thank you for involving me in the care of Debi Ribera Jennifer Claudio. Please do not hesitate to call for any questions or concerns.          Electronically signed by Olga Smith MD on 1/20/2022 at 9:11 AM

## 2022-01-21 PROBLEM — R41.82 ALTERED MENTAL STATUS: Status: ACTIVE | Noted: 2022-01-21

## 2022-01-21 PROBLEM — R40.20 COMA (HCC): Status: ACTIVE | Noted: 2022-01-21

## 2022-01-21 LAB
ALBUMIN SERPL-MCNC: 2.7 G/DL (ref 3.5–5.2)
ALP BLD-CCNC: 67 U/L (ref 35–104)
ALT SERPL-CCNC: 9 U/L (ref 0–32)
ANION GAP SERPL CALCULATED.3IONS-SCNC: 8 MMOL/L (ref 7–16)
AST SERPL-CCNC: 18 U/L (ref 0–31)
B.E.: 5.2 MMOL/L (ref -3–3)
BILIRUB SERPL-MCNC: 0.4 MG/DL (ref 0–1.2)
BUN BLDV-MCNC: 55 MG/DL (ref 6–23)
CALCIUM SERPL-MCNC: 10.1 MG/DL (ref 8.6–10.2)
CHLORIDE BLD-SCNC: 112 MMOL/L (ref 98–107)
CO2: 29 MMOL/L (ref 22–29)
CREAT SERPL-MCNC: 1.6 MG/DL (ref 0.5–1)
DEVICE: ABNORMAL
GFR AFRICAN AMERICAN: 39
GFR NON-AFRICAN AMERICAN: 32 ML/MIN/1.73
GLUCOSE BLD-MCNC: 153 MG/DL (ref 74–99)
HCO3 ARTERIAL: 30.9 MMOL/L (ref 22–26)
MAGNESIUM: 2.2 MG/DL (ref 1.6–2.6)
O2 SATURATION: 91.3 % (ref 92–98.5)
OPERATOR ID: 1102
PCO2 ARTERIAL: 48.9 MMHG (ref 35–45)
PH BLOOD GAS: 7.41 (ref 7.35–7.45)
PHOSPHORUS: 2 MG/DL (ref 2.5–4.5)
PO2 ARTERIAL: 62.3 MMHG (ref 60–80)
POTASSIUM SERPL-SCNC: 4.2 MMOL/L (ref 3.5–5)
SODIUM BLD-SCNC: 149 MMOL/L (ref 132–146)
SOURCE, BLOOD GAS: ABNORMAL
TOTAL PROTEIN: 5.3 G/DL (ref 6.4–8.3)

## 2022-01-21 PROCEDURE — 6370000000 HC RX 637 (ALT 250 FOR IP): Performed by: INTERNAL MEDICINE

## 2022-01-21 PROCEDURE — 36415 COLL VENOUS BLD VENIPUNCTURE: CPT

## 2022-01-21 PROCEDURE — 6360000002 HC RX W HCPCS: Performed by: HOSPITALIST

## 2022-01-21 PROCEDURE — 2700000000 HC OXYGEN THERAPY PER DAY

## 2022-01-21 PROCEDURE — 6370000000 HC RX 637 (ALT 250 FOR IP): Performed by: SURGERY

## 2022-01-21 PROCEDURE — 6360000002 HC RX W HCPCS: Performed by: SURGERY

## 2022-01-21 PROCEDURE — U0005 INFEC AGEN DETEC AMPLI PROBE: HCPCS

## 2022-01-21 PROCEDURE — 87040 BLOOD CULTURE FOR BACTERIA: CPT

## 2022-01-21 PROCEDURE — 2580000003 HC RX 258: Performed by: HOSPITALIST

## 2022-01-21 PROCEDURE — 82803 BLOOD GASES ANY COMBINATION: CPT

## 2022-01-21 PROCEDURE — 6360000002 HC RX W HCPCS: Performed by: PSYCHIATRY & NEUROLOGY

## 2022-01-21 PROCEDURE — 94660 CPAP INITIATION&MGMT: CPT

## 2022-01-21 PROCEDURE — 84100 ASSAY OF PHOSPHORUS: CPT

## 2022-01-21 PROCEDURE — 2580000003 HC RX 258: Performed by: SPECIALIST

## 2022-01-21 PROCEDURE — 2060000000 HC ICU INTERMEDIATE R&B

## 2022-01-21 PROCEDURE — C9113 INJ PANTOPRAZOLE SODIUM, VIA: HCPCS | Performed by: HOSPITALIST

## 2022-01-21 PROCEDURE — 94640 AIRWAY INHALATION TREATMENT: CPT

## 2022-01-21 PROCEDURE — 6360000002 HC RX W HCPCS: Performed by: SPECIALIST

## 2022-01-21 PROCEDURE — 80053 COMPREHEN METABOLIC PANEL: CPT

## 2022-01-21 PROCEDURE — 2580000003 HC RX 258: Performed by: INTERNAL MEDICINE

## 2022-01-21 PROCEDURE — 83735 ASSAY OF MAGNESIUM: CPT

## 2022-01-21 PROCEDURE — U0003 INFECTIOUS AGENT DETECTION BY NUCLEIC ACID (DNA OR RNA); SEVERE ACUTE RESPIRATORY SYNDROME CORONAVIRUS 2 (SARS-COV-2) (CORONAVIRUS DISEASE [COVID-19]), AMPLIFIED PROBE TECHNIQUE, MAKING USE OF HIGH THROUGHPUT TECHNOLOGIES AS DESCRIBED BY CMS-2020-01-R: HCPCS

## 2022-01-21 PROCEDURE — 6360000002 HC RX W HCPCS: Performed by: INTERNAL MEDICINE

## 2022-01-21 RX ORDER — FUROSEMIDE 10 MG/ML
20 INJECTION INTRAMUSCULAR; INTRAVENOUS ONCE
Status: COMPLETED | OUTPATIENT
Start: 2022-01-21 | End: 2022-01-21

## 2022-01-21 RX ORDER — THIAMINE HYDROCHLORIDE 100 MG/ML
100 INJECTION, SOLUTION INTRAMUSCULAR; INTRAVENOUS DAILY
Status: COMPLETED | OUTPATIENT
Start: 2022-01-21 | End: 2022-01-23

## 2022-01-21 RX ADMIN — IPRATROPIUM BROMIDE AND ALBUTEROL SULFATE 3 ML: .5; 3 SOLUTION RESPIRATORY (INHALATION) at 09:35

## 2022-01-21 RX ADMIN — IPRATROPIUM BROMIDE AND ALBUTEROL SULFATE 3 ML: .5; 3 SOLUTION RESPIRATORY (INHALATION) at 06:05

## 2022-01-21 RX ADMIN — METOPROLOL TARTRATE 50 MG: 50 TABLET, FILM COATED ORAL at 21:54

## 2022-01-21 RX ADMIN — PANTOPRAZOLE SODIUM 40 MG: 40 INJECTION, POWDER, FOR SOLUTION INTRAVENOUS at 21:54

## 2022-01-21 RX ADMIN — SODIUM CHLORIDE, PRESERVATIVE FREE 10 ML: 5 INJECTION INTRAVENOUS at 21:54

## 2022-01-21 RX ADMIN — PREDNISONE 5 MG: 5 TABLET ORAL at 09:01

## 2022-01-21 RX ADMIN — DOCUSATE SODIUM 100 MG: 50 LIQUID ORAL at 09:01

## 2022-01-21 RX ADMIN — HEPARIN SODIUM 5000 UNITS: 5000 INJECTION INTRAVENOUS; SUBCUTANEOUS at 08:49

## 2022-01-21 RX ADMIN — DOCUSATE SODIUM 100 MG: 50 LIQUID ORAL at 21:53

## 2022-01-21 RX ADMIN — PIPERACILLIN SODIUM AND TAZOBACTAM SODIUM 3375 MG: 3; .375 INJECTION, POWDER, LYOPHILIZED, FOR SOLUTION INTRAVENOUS at 09:30

## 2022-01-21 RX ADMIN — HEPARIN 300 UNITS: 100 SYRINGE at 08:51

## 2022-01-21 RX ADMIN — Medication 10 ML: at 08:53

## 2022-01-21 RX ADMIN — FLUCONAZOLE 400 MG: 2 INJECTION INTRAVENOUS at 15:10

## 2022-01-21 RX ADMIN — PIPERACILLIN SODIUM AND TAZOBACTAM SODIUM 3375 MG: 3; .375 INJECTION, POWDER, LYOPHILIZED, FOR SOLUTION INTRAVENOUS at 17:30

## 2022-01-21 RX ADMIN — LEVOTHYROXINE SODIUM 50 MCG: 0.05 TABLET ORAL at 08:49

## 2022-01-21 RX ADMIN — BUDESONIDE 500 MCG: 0.5 INHALANT RESPIRATORY (INHALATION) at 06:06

## 2022-01-21 RX ADMIN — HEPARIN 100 UNITS: 100 SYRINGE at 08:50

## 2022-01-21 RX ADMIN — ACYCLOVIR SODIUM 500 MG: 50 INJECTION, SOLUTION INTRAVENOUS at 21:55

## 2022-01-21 RX ADMIN — PANTOPRAZOLE SODIUM 40 MG: 40 INJECTION, POWDER, FOR SOLUTION INTRAVENOUS at 08:49

## 2022-01-21 RX ADMIN — IPRATROPIUM BROMIDE AND ALBUTEROL SULFATE 3 ML: .5; 3 SOLUTION RESPIRATORY (INHALATION) at 17:12

## 2022-01-21 RX ADMIN — ALLOPURINOL 100 MG: 100 TABLET ORAL at 08:49

## 2022-01-21 RX ADMIN — FOLIC ACID 1 MG: 1 TABLET ORAL at 08:49

## 2022-01-21 RX ADMIN — PIPERACILLIN SODIUM AND TAZOBACTAM SODIUM 3375 MG: 3; .375 INJECTION, POWDER, LYOPHILIZED, FOR SOLUTION INTRAVENOUS at 02:50

## 2022-01-21 RX ADMIN — HEPARIN SODIUM 5000 UNITS: 5000 INJECTION INTRAVENOUS; SUBCUTANEOUS at 21:53

## 2022-01-21 RX ADMIN — IPRATROPIUM BROMIDE AND ALBUTEROL SULFATE 3 ML: .5; 3 SOLUTION RESPIRATORY (INHALATION) at 13:44

## 2022-01-21 RX ADMIN — BUDESONIDE 500 MCG: 0.5 INHALANT RESPIRATORY (INHALATION) at 17:12

## 2022-01-21 RX ADMIN — THIAMINE HYDROCHLORIDE 100 MG: 100 INJECTION, SOLUTION INTRAMUSCULAR; INTRAVENOUS at 12:28

## 2022-01-21 RX ADMIN — METOPROLOL TARTRATE 50 MG: 50 TABLET, FILM COATED ORAL at 08:49

## 2022-01-21 RX ADMIN — FUROSEMIDE 20 MG: 10 INJECTION, SOLUTION INTRAMUSCULAR; INTRAVENOUS at 08:45

## 2022-01-21 RX ADMIN — SODIUM CHLORIDE, PRESERVATIVE FREE 10 ML: 5 INJECTION INTRAVENOUS at 08:49

## 2022-01-21 RX ADMIN — HEPARIN 100 UNITS: 100 SYRINGE at 21:54

## 2022-01-21 RX ADMIN — ACYCLOVIR SODIUM 500 MG: 50 INJECTION, SOLUTION INTRAVENOUS at 15:10

## 2022-01-21 RX ADMIN — ATORVASTATIN CALCIUM 10 MG: 10 TABLET, FILM COATED ORAL at 08:49

## 2022-01-21 ASSESSMENT — PAIN SCALES - GENERAL: PAINLEVEL_OUTOF10: 0

## 2022-01-21 NOTE — PROGRESS NOTES
Pulmonary/Critical Care Progress Note    We are following patient for acute respiratory failure with hypoxia and hypercapnia, hypoventilation, acute mental status change, pneumatosis of the colon, colitis, UTI, history of kidney transplant, home immunosuppression agent, acute on chronic renal failure, hypothyroidism, history of bipolar disorder, COPD. In Summary:  Dick Lara is a 73 y.o. female with hx of Kidney TX, , CKD, Bipolar, morbid obesity who presents with pneumatosis of colon, altered mental status         ISSUES:        Acute Hypercapneic Respiratory Failure   COPD  Morbid Obesity  ? Baseline obesity hypoventialtion  On BiPAP  On IPAP  14 (was on 20)     ABG ordered - not completed  Reordered for today     If worsens (drops < 7.2) will need intubation  May adjust pending ABG results         AMS  ? Secondary to above   Unclear cause  No real change x 3 days    Pneumatosis of colon, no evidence of bowel obstruction  Colitis  Bowel adhesion  Continue IV fluid. NG tube to low wall intermittent suction. Being followed by GI / Surgery      UTI  History of kidney transplant, on immunosuppressant agents  Acute on chronic renal failure  cefepime/Zyvox/Diflucan/Flagyl per ID. Hypertension  Bipolar disorder  Hypothyroidism. History of     Will follow results    ____________________________________      SUBJECTIVE:  Patient seen and examined in room. She is still very somnolent barely arousable.     MEDICATIONS:   thiamine  100 mg IntraVENous Daily    acyclovir  5 mg/kg IntraVENous Q8H    lidocaine PF  5 mL IntraDERmal Once    sodium chloride flush  5-40 mL IntraVENous 2 times per day    heparin flush  3 mL IntraVENous 2 times per day    piperacillin-tazobactam  3,375 mg IntraVENous Q8H    ipratropium-albuterol  3 mL Inhalation 4x daily    pantoprazole  40 mg IntraVENous BID    And    sodium chloride (PF)  10 mL IntraVENous BID    fluconazole  400 mg IntraVENous Q24H    metoprolol tartrate  50 mg Oral BID    docusate  100 mg Oral BID    heparin (porcine)  5,000 Units SubCUTAneous BID    predniSONE  5 mg Oral Daily    heparin flush  1 mL IntraVENous 2 times per day    allopurinol  100 mg Oral Daily    budesonide  500 mcg Nebulization BID    folic acid  1 mg Oral Daily    levothyroxine  50 mcg Oral Daily    atorvastatin  10 mg Oral Daily    ondansetron  4 mg Oral Once      lactated ringers Stopped (01/21/22 0830)    sodium chloride      sodium chloride 25 mL (01/16/22 0035)     sodium chloride flush, sodium chloride, heparin flush, perflutren lipid microspheres, ondansetron **OR** ondansetron, polyethylene glycol, acetaminophen **OR** acetaminophen, sodium chloride flush, sodium chloride, heparin flush      REVIEW OF SYSTEMS:  Unable to obtain due to patient mental status. OBJECTIVE:  Vitals:    01/21/22 0804   BP: (!) 114/59   Pulse: 119   Resp: 29   Temp: 100.8 °F (38.2 °C)   SpO2: 96%     FiO2 : 35 %  O2 Flow Rate (L/min): 5.5 L/min  O2 Device: PAP (positive airway pressure)    PHYSICAL EXAM:  Cardiovascular: Normal S1 and S2, no murmur. Respiratory: Diminished breathing sound bilaterally. Gastrointestinal: Distended, hypoactive bowel sounds.   Genitourinary: Unremarkable  Extremities: 1+ pitting edema on both lower extremity  Neurological: +++ lethargic    LABS:  WBC   Date Value Ref Range Status   01/20/2022 18.5 (H) 4.5 - 11.5 E9/L Final   01/19/2022 21.4 (H) 4.5 - 11.5 E9/L Final   01/19/2022 20.5 (H) 4.5 - 11.5 E9/L Final     Hemoglobin   Date Value Ref Range Status   01/20/2022 10.5 (L) 11.5 - 15.5 g/dL Final   01/19/2022 11.6 11.5 - 15.5 g/dL Final   01/19/2022 11.2 (L) 11.5 - 15.5 g/dL Final     Hematocrit   Date Value Ref Range Status   01/20/2022 36.1 34.0 - 48.0 % Final   01/19/2022 37.3 34.0 - 48.0 % Final   01/19/2022 39.6 34.0 - 48.0 % Final     MCV   Date Value Ref Range Status   01/20/2022 106.2 (H) 80.0 - 99.9 fL Final   01/19/2022 104.8 (H) 80.0 - 99.9 fL Final   01/19/2022 106.5 (H) 80.0 - 99.9 fL Final     Platelets   Date Value Ref Range Status   01/20/2022 113 (L) 130 - 450 E9/L Final   01/19/2022 96 (L) 130 - 450 E9/L Final   01/19/2022 154 130 - 450 E9/L Final     Sodium   Date Value Ref Range Status   01/21/2022 149 (H) 132 - 146 mmol/L Final   01/20/2022 147 (H) 132 - 146 mmol/L Final   01/19/2022 145 132 - 146 mmol/L Final     Potassium   Date Value Ref Range Status   01/21/2022 4.2 3.5 - 5.0 mmol/L Final   01/20/2022 4.2 3.5 - 5.0 mmol/L Final   01/19/2022 5.3 (H) 3.5 - 5.0 mmol/L Final     Potassium reflex Magnesium   Date Value Ref Range Status   01/10/2022 5.5 (H) 3.5 - 5.0 mmol/L Final     Comment:     Specimen is moderately Hemolyzed. Result may be artificially increased. 12/17/2019 5.2 (H) 3.5 - 5.0 mmol/L Final     Comment:     Specimen is moderately Hemolyzed. Result may be artificially increased.      Chloride   Date Value Ref Range Status   01/21/2022 112 (H) 98 - 107 mmol/L Final   01/20/2022 113 (H) 98 - 107 mmol/L Final   01/19/2022 118 (H) 98 - 107 mmol/L Final     CO2   Date Value Ref Range Status   01/21/2022 29 22 - 29 mmol/L Final   01/20/2022 26 22 - 29 mmol/L Final   01/19/2022 14 (L) 22 - 29 mmol/L Final     BUN   Date Value Ref Range Status   01/21/2022 55 (H) 6 - 23 mg/dL Final   01/20/2022 61 (H) 6 - 23 mg/dL Final   01/19/2022 77 (H) 6 - 23 mg/dL Final     CREATININE   Date Value Ref Range Status   01/21/2022 1.6 (H) 0.5 - 1.0 mg/dL Final   01/20/2022 1.6 (H) 0.5 - 1.0 mg/dL Final   01/19/2022 1.7 (H) 0.5 - 1.0 mg/dL Final     Glucose   Date Value Ref Range Status   01/21/2022 153 (H) 74 - 99 mg/dL Final   01/20/2022 112 (H) 74 - 99 mg/dL Final   01/19/2022 110 (H) 74 - 99 mg/dL Final     Calcium   Date Value Ref Range Status   01/21/2022 10.1 8.6 - 10.2 mg/dL Final   01/20/2022 9.9 8.6 - 10.2 mg/dL Final   01/19/2022 10.7 (H) 8.6 - 10.2 mg/dL Final     Total Protein   Date Value Ref Range Status   01/21/2022 5.3 (L) 6.4 - 8.3 g/dL Final   01/20/2022 4.6 (L) 6.4 - 8.3 g/dL Final   01/19/2022 5.1 (L) 6.4 - 8.3 g/dL Final     Albumin   Date Value Ref Range Status   01/21/2022 2.7 (L) 3.5 - 5.2 g/dL Final   01/20/2022 2.2 (L) 3.5 - 5.2 g/dL Final   01/19/2022 2.3 (L) 3.5 - 5.2 g/dL Final     Total Bilirubin   Date Value Ref Range Status   01/21/2022 0.4 0.0 - 1.2 mg/dL Final   01/20/2022 0.4 0.0 - 1.2 mg/dL Final   01/19/2022 0.4 0.0 - 1.2 mg/dL Final     Alkaline Phosphatase   Date Value Ref Range Status   01/21/2022 67 35 - 104 U/L Final   01/20/2022 68 35 - 104 U/L Final   01/19/2022 77 35 - 104 U/L Final     Comment:     Specimen is moderately Hemolyzed. Result may be artificially decreased. AST   Date Value Ref Range Status   01/21/2022 18 0 - 31 U/L Final   01/20/2022 10 0 - 31 U/L Final   01/19/2022 24 0 - 31 U/L Final     Comment:     Specimen is moderately Hemolyzed. Result may be artificially increased. ALT   Date Value Ref Range Status   01/21/2022 9 0 - 32 U/L Final   01/20/2022 6 0 - 32 U/L Final   01/19/2022 12 0 - 32 U/L Final     Comment:     Specimen is moderately Hemolyzed. Result may be artificially increased. GFR Non-   Date Value Ref Range Status   01/21/2022 32 >=60 mL/min/1.73 Final     Comment:     Chronic Kidney Disease: less than 60 ml/min/1.73 sq.m. Kidney Failure: less than 15 ml/min/1.73 sq.m. Results valid for patients 18 years and older. 01/20/2022 32 >=60 mL/min/1.73 Final     Comment:     Chronic Kidney Disease: less than 60 ml/min/1.73 sq.m. Kidney Failure: less than 15 ml/min/1.73 sq.m. Results valid for patients 18 years and older. 01/19/2022 30 >=60 mL/min/1.73 Final     Comment:     Chronic Kidney Disease: less than 60 ml/min/1.73 sq.m. Kidney Failure: less than 15 ml/min/1.73 sq.m. Results valid for patients 18 years and older.        GFR    Date Value Ref Range Status   01/21/2022 39  Final   01/20/2022 39  Final   01/19/2022 36  Final     Magnesium   Date Value Ref Range Status   01/21/2022 2.2 1.6 - 2.6 mg/dL Final   01/20/2022 2.1 1.6 - 2.6 mg/dL Final   01/19/2022 2.3 1.6 - 2.6 mg/dL Final     Phosphorus   Date Value Ref Range Status   01/21/2022 2.0 (L) 2.5 - 4.5 mg/dL Final   01/20/2022 2.2 (L) 2.5 - 4.5 mg/dL Final   01/19/2022 3.1 2.5 - 4.5 mg/dL Final     Recent Labs     01/19/22  0130   PH 7.333*   PO2 89.9   PCO2 42.9   HCO3 22.3   BE -3.5*   O2SAT 96.3       RADIOLOGY:  CT ABDOMEN PELVIS WO CONTRAST Additional Contrast? None   Final Result   1. Colitis involving the left colon from splenic flexure to mid to distal   sigmoid colon. 2. Atrophy of native kidneys. Multiple renal cysts and nonobstructing left   renal calculus. Transplant kidney in right iliac fossa. 3. Trace ascites superficial to the liver. 4. Unchanged consolidation in lower lobes. 5. Unchanged cardiomegaly. 6. Possible gallbladder sludge and or cholelithiasis. RECOMMENDATIONS:   Unavailable         XR ABDOMEN (KUB) (SINGLE AP VIEW)   Final Result   No evidence of bowel obstruction. CT CHEST WO CONTRAST   Final Result   1. Bilateral lower lobe alveolar opacities compatible with pneumonia. Increased consolidation in the right lower lobe compared to the prior study. 2.  Trace effusions and trace pericardial effusion with left ventricular   dilatation. 3.  No evidence to suggest empyema. 4.  Gastric catheter passes into the stomach. XR CHEST PORTABLE   Final Result   Infiltrate and or atelectasis left greater than right with mildly improving   aeration in the left mid lung. MRI BRAIN WO CONTRAST   Final Result   No acute intracranial abnormality. XR ABDOMEN FOR NG/OG/NE TUBE PLACEMENT   Final Result   NG tube in good position. XR CHEST PORTABLE   Final Result   Cardiomegaly. Findings for an infiltrate with some consolidation in the mid lower aspect of   the left lung.          CT ABDOMEN PELVIS WO CONTRAST Additional Contrast? Oral   Final Result   There is pneumatosis involving the ascending to transverse colon. There is   wall thickening of the splenic flexure to descending colon. This is   nonspecific but can be seen related to ischemic enteritis but there is no   evidence for portal venous gas,, medication induced pneumatosis in a patient   taking steroids or chemotherapy, procedure related. Clinical correlation   recommended. Surgical consultation may be needed. There is fairly dense bibasilar infiltrates which have progressed from prior   exam.  Correlation with fever and white count and follow-up to resolution   recommended. Multiple mixed density cysts both kidneys related to chronic renal failure   with the transplant right lower quadrant. There is distention of small bowel in the lower pelvis without obstructing   lesion identified. Small hiatal hernia. XR ABDOMEN (KUB) (SINGLE AP VIEW)   Final Result   Abundant gas and stool throughout the colon may represent constipation. XR CHEST PORTABLE   Final Result   1. Stable cardiomegaly. There is no evidence of failure or pneumonia. CT ABDOMEN PELVIS WO CONTRAST Additional Contrast? None   Final Result   No significant fluid within the large bowel. No hydronephrosis of the transplant kidneys. The native kidneys are atrophic   and have multiple intermediate density cysts which should be further   evaluated with nonemergent ultrasound. Atherosclerotic disease.          IR MIDLINE CATH    (Results Pending)           Electronically signed by Rosy Dolan MD on 1/21/2022 at 1:44 PM

## 2022-01-21 NOTE — CONSULTS
History Of Present Illness: ASSESSMENT / PLAN:     1. Pneumatosis of the colon  Management per primary service and surgery     2. CECILY (acute kidney injury) ([facility] [address] status post transplanted kidney  Management per primary service and nephrology     3. Tachycardia  Likely due to underlying etiology of pneumatosis of the colon  Please treat underlying cause and replace electrolytes     4. chronic heart failure with systolic function  Continue metoprolol succinate as blood pressure allows and uptitrate for optimal rate control  Initiate Entresto after surgery and when kidney function resolves and blood pressure can tolerate  5. Hypertension  6. bipolar disorder  7. COPD  8. Hypothyroidism  9. Depression  10. Obesity  11. peripheral vascular disease status post femoral bypass in the past  12. chronic left bundle branch block  13.         HLD, on a statin  14.         thrombocytopenia   15.       Obesity  16.       Depression  17.       Hypothyroidism  18.       Hx parathyroidectomy  19.       Hx clotting disorder with thrombophlebitis remotely with no Dvt on ultrasound  20.       Former smoker  As above per cardiology. The patient is a 73 y.o. female with significant past medical history of see above and below who presents with above.       The patient has the following symptoms:    Change in level of consciousness: unresponsive    New Weakness: no    Numbness or Tingling: no    Difficulty Swallowing: yes    Current Medications:   Scheduled Meds:   thiamine  100 mg IntraVENous Daily    lidocaine PF  5 mL IntraDERmal Once    sodium chloride flush  5-40 mL IntraVENous 2 times per day    heparin flush  3 mL IntraVENous 2 times per day    piperacillin-tazobactam  3,375 mg IntraVENous Q8H    ipratropium-albuterol  3 mL Inhalation 4x daily    pantoprazole  40 mg IntraVENous BID    And    sodium chloride (PF)  10 mL IntraVENous BID    fluconazole  400 mg IntraVENous Q24H    metoprolol tartrate  50 mg Oral BID    docusate  100 mg Oral BID    heparin (porcine)  5,000 Units SubCUTAneous BID    predniSONE  5 mg Oral Daily    heparin flush  1 mL IntraVENous 2 times per day    allopurinol  100 mg Oral Daily    budesonide  500 mcg Nebulization BID    folic acid  1 mg Oral Daily    levothyroxine  50 mcg Oral Daily    atorvastatin  10 mg Oral Daily    ondansetron  4 mg Oral Once     Continuous Infusions:   lactated ringers Stopped (01/21/22 0830)    sodium chloride      sodium chloride 25 mL (01/16/22 0035)     PRN Meds:sodium chloride flush, sodium chloride, heparin flush, perflutren lipid microspheres, ondansetron **OR** ondansetron, polyethylene glycol, acetaminophen **OR** acetaminophen, sodium chloride flush, sodium chloride, heparin flush    Allergies:  Macrodantin [nitrofurantoin macrocrystal] and Sulfa antibiotics    Social History:   TOBACCO:   reports that she quit smoking about 6 years ago. She has a 20.00 pack-year smoking history. She has never used smokeless tobacco.  ETOH:   reports no history of alcohol use.     Past Medical History:        Diagnosis Date    Abnormal EKG     left bundle branch block    Acute gout involving toe of right foot 9/3/2020    Acute gout involving toe of right foot 9/3/2020    Acute on chronic combined systolic (congestive) and diastolic (congestive) heart failure (HCC)     Cancer (HCC)     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    Cerebrovascular disease     Clotting disorder ([facility] [address] 1985    thrombophlebitis after c section delivery    Depression     15 years followed by dr Hermes Ritter Hyperlipidemia     Hypertension     Hyperthyroidism     Leg swelling 9/3/2020    Lymphedema of both lower extremities 9/3/2020    Peripheral vascular disease ([facility] [address]     Renal failure 11/2012    renal transplant    Thrombophlebitis     after c section delivery    Thyroid disease        Past Surgical History:        Procedure Laterality Date   [facility] normal delivery    COLECTOMY N/A 1/15/2022    DIAGNOSTIC  LAPAROSCOPY LYSIS OF ADHESIONS performed by Anastasiia Griffith MD at [address] [facility] [address] CATH LAB PROCEDURE  2006    normal coronaries and 1996 normal coronaries    DIALYSIS FISTULA CREATION  march and july 2012    phase one and two patient will start dialysis when needed   [address]    transfemoral venous bypass grafts    KIDNEY TRANSPLANT  2016    KIDNEY TRANSPLANT  2015    KIDNEY TRANSPLANT  2015    PARATHYROIDECTOMY  2006    left parathyroid  implant and parathyroidectomy         Outside reports reviewed: ER records, historical medical records, lab reports and radiology reports. Patient's medications, allergies, past medical, surgical, social and family histories were reviewed and updated as appropriate. Review of Systems  A comprehensive review of systems was negative except for:       Objective:     Neuro exam 114/60 p118 t 100.8  General: comatose. Grimaces to tactile/noxious stim  Cranial nerve testing  unable to cooperate. PERRL, corneal reflexes +  . Funduscopic eye exam revealed not testable. Motor exam: . Napoleon Freud Deep tendon reflexes were absent bilaterally. Plantar responses were flexor bilaterally. Cerebellar exam noted . Napoleon Freud Sensation was . Napoleon Freud       Assessment:   Altered mental status/coma of uncertain etiology but definitely associated with sepsis/infection with unrevealing MRI brain 1/16/22  General surgery lower GI eval negative  Macrocytic anemia with supranormal B12 level  CECILY with hypernatremia      Plan:   Empiric thiamine  Stop pristiq--can be resumed once medically stabilized/return of awareness/cognition  EEG  Ongoing ID management of antibiotics--consider LP with IR (platelet count 713Q today)  Ongoing intensivist/pulmonary management  Discussed with Dr Guilherme Edwards  Thanks for consult

## 2022-01-21 NOTE — PROGRESS NOTES
Physical Therapy      Room #:   0515/0515-02    Date: 2022       Patient Name: Kuldeep Zimmer  : 1955      MRN: [id number]     Patient unavailable for physical therapy treatment due to unavailable/not appropriate per nursing due to patient unresponsive unless its pain. Therapy will check back at a later time/date.      Viv Lobato, [facility]#[id number]

## 2022-01-21 NOTE — PROGRESS NOTES
[facility] MD Scott Garcia MD. Dell Da Silva MD   Progress Note    1/21/2022    SUBJECTIVE:   1/13:Seen in her room , lying flat on o2/nc , euvolemic , confused   D/w RN on floor   1/14: Patient was seen, interviewed and examined in her room, overall feeling much better with less confusion today. Discussed the case in detail with the nurse, patient had history of kidney transplant in 2015, has maintained of 2 medications including cyclosporine and MMF. We will check with lab to obtain values both medications random throughout toxicity levels that could be responsible for her confusion at one point or another. 1/15: Patient was seen in her room. Case discussed with her son was informing me that, she was scheduled for expiratory laparotomy for probable obstruction of her bowels as per general surgery. We will follow clinical status closely with surgery or any other intervention needed from nephrology. 1/16: Patient underwent yesterday laparoscopic lysis of adhesions with no other pathology being found as per general surgery, will keep following her renal status closely with BUN today is 36 and creatinine down to 1.5 overall stable  1/17 : sleeping on bipap when seeing her   No LE edema   Cr creeping up   General surgery feel there is no intraabdominal pathology   1/18: Patient sleepy but arousable, input of general surgery appreciated clarifying that there was no pneumatosis abdomen distention thought to be secondary to sepsis the patient is then for different reasons. Patient has history of congestive heart failure for which cardiology is following she is sleeping on BiPAP with no lower extremity edema. Patient is status post transplant on cyclosporine and whole blood was sent for assessing the level.     1/19 : seen  In her room continue to be lethargic comfused   On TF   BP stable   Good UO via hassan     1/20 : seen in her room confused , on BIPAP ,per RN no major change in mental status   Appear euvolemic no LE edema   On renal TF   Per Staff plan to transfer to [facility] . PROBLEM LIST:    Principal Problem:    Coma ([facility] [address]  Active Problems:    CECILY (acute kidney injury) ([address]    Ischemic bowel disease ([address]    Altered mental status  Resolved Problems:    * No resolved hospital problems.  *       DIET:    ADULT TUBE FEEDING; Nasogastric; Renal Formula; Continuous; 10; Yes; 10; Q 12 hours; 30; 30; Q 4 hours       Allergies : Macrodantin [nitrofurantoin macrocrystal] and Sulfa antibiotics    Past Medical History:   Diagnosis Date    Abnormal EKG     left bundle branch block    Acute gout involving toe of right foot 9/3/2020    Acute gout involving toe of right foot 9/3/2020    Acute on chronic combined systolic (congestive) and diastolic (congestive) heart failure (HCC)     Cancer (HCC)     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    Cerebrovascular disease     Clotting disorder ([facility] [address] 1985    thrombophlebitis after c section delivery    Depression     15 years followed by dr Martinez Mo    Hyperlipidemia     Hypertension     Hyperthyroidism     Leg swelling 9/3/2020    Lymphedema of both lower extremities 9/3/2020    Peripheral vascular disease ([facility] [address]     Renal failure 11/2012    renal transplant    Thrombophlebitis     after c section delivery    Thyroid disease        Past Surgical History:   Procedure Laterality Date   [facility]    one normal delivery    COLECTOMY N/A 1/15/2022    DIAGNOSTIC  LAPAROSCOPY LYSIS OF ADHESIONS performed by Graciela Gonzalez MD at [address] [place] [address] Drive CATH LAB PROCEDURE  2006    normal coronaries and 1996 normal coronaries    DIALYSIS FISTULA CREATION  march and july 2012    phase one and two patient will start dialysis when needed   [address]    transfemoral venous bypass grafts    KIDNEY TRANSPLANT  2016   [facility]  2015   [facility]  2015    PARATHYROIDECTOMY  2006    left parathyroid  implant and parathyroidectomy       Family History   Problem Relation Age of Onset    Diabetes Mother     Diabetes Father     Dementia Father         reports that she quit smoking about 6 years ago. She has a 20.00 pack-year smoking history. She has never used smokeless tobacco. She reports that she does not drink alcohol and does not use drugs. Review of Systems:   Constitutional: no fevers , no chills , feels ok   Eyes: no eye pain , no itching , no drainage  Ears, nose, mouth, throat, and face: no ear ,nose pain , hearing is ok ,no nasal drainage   Respiratory: no sob ,no cough ,no wheezing . Cardiovascular: no chest pain , no palpitation ,no sob . Gastrointestinal: no nausea, vomiting , constipation , no abdominal pain . Genitourinary:no urinary retention , no burning , dysuria . No polyuria   Hematologic/lymphatic: no bleeding , no cougulation issues . Musculoskeletal:no joint pain , no swelling . Neurological: no headaches ,no weakness , no numbness . Endocrine: no thirst , no weight issues .      MEDS (scheduled):    thiamine  100 mg IntraVENous Daily    acyclovir  5 mg/kg IntraVENous Q8H    lidocaine PF  5 mL IntraDERmal Once    sodium chloride flush  5-40 mL IntraVENous 2 times per day    heparin flush  3 mL IntraVENous 2 times per day    piperacillin-tazobactam  3,375 mg IntraVENous Q8H    ipratropium-albuterol  3 mL Inhalation 4x daily    pantoprazole  40 mg IntraVENous BID    And    sodium chloride (PF)  10 mL IntraVENous BID    fluconazole  400 mg IntraVENous Q24H    metoprolol tartrate  50 mg Oral BID    docusate  100 mg Oral BID    heparin (porcine)  5,000 Units SubCUTAneous BID    predniSONE  5 mg Oral Daily    heparin flush  1 mL IntraVENous 2 times per day    allopurinol  100 mg Oral Daily    budesonide  500 mcg Nebulization BID    folic acid  1 mg Oral Daily    levothyroxine  50 mcg Oral Daily    atorvastatin  10 mg Oral Daily    ondansetron  4 mg Oral Once       MEDS (infusions):   lactated ringers Stopped (01/21/22 0830)    sodium chloride      sodium chloride 25 mL (01/16/22 0035)       MEDS (prn):  sodium chloride flush, sodium chloride, heparin flush, perflutren lipid microspheres, ondansetron **OR** ondansetron, polyethylene glycol, acetaminophen **OR** acetaminophen, sodium chloride flush, sodium chloride, heparin flush    PHYSICAL EXAM:     Patient Vitals for the past 24 hrs:   BP Temp Temp src Pulse Resp SpO2   01/21/22 1719 109/63 98.6 °F (37 °C) Axillary 106 23 96 %   01/21/22 1712 -- -- -- -- 24 --   01/21/22 1346 -- -- -- -- 23 --   01/21/22 0804 (!) 114/59 100.8 °F (38.2 °C) Axillary 119 29 96 %   01/21/22 0419 116/64 99 °F (37.2 °C) Axillary 100 25 94 %   01/21/22 0225 -- -- -- -- 28 --   01/20/22 2103 122/69 99.2 °F (37.3 °C) Axillary 86 20 93 %   @      Intake/Output Summary (Last 24 hours) at 1/21/2022 1858  Last data filed at 1/21/2022 1518  Gross per 24 hour   Intake 1001 ml   Output 1425 ml   Net -424 ml         Wt Readings from Last 3 Encounters:   01/19/22 216 lb (98 kg)   11/17/21 227 lb 3.2 oz (103.1 kg)   08/16/21 229 lb (103.9 kg)       Constitutional:  in no acute distress  Oral: mucus membranes moist  Neck: no JVD  Cardiovascular: S1, S2 regular rhythm, no murmur,or rub  Respiratory:  No crackles, no wheeze  Gastrointestinal:  Soft, nontender, nondistended, NABS  Ext: no edema, feet warm  Skin: dry, no rash  Neuro: awake, alert, interactive      DATA:    Recent Labs     01/19/22  0128 01/19/22  0128 01/19/22  0527 01/19/22  1740 01/20/22  0634   WBC 20.5*  --  21.4*  --  18.5*   HGB 11.2*  --  11.6  --  10.5*   HCT 39.1   < > 39.6 37.3 36.1   .5*  --  104.8*  --  106.2*     --  96*  --  113*    < > = values in this interval not displayed.      Recent Labs     01/19/22  0527 01/19/22  0527 01/19/22  0700 01/20/22  0634 01/21/22  0540     --   --  147* 149*   K 8.5*   < > 5.3* 4.2 4.2   *  --   --  113* 112*   CO2 14*  --   --  26 29   MG 2.3  --   --  2.1 2.2   PHOS 3.1  --   --  2.2* 2.0*   BUN 77*  --   --  61* 55*   CREATININE 1.7*  --   --  1.6* 1.6*   ALT 12  --   --  6 9   AST 24  --   --  10 18   BILITOT 0.4  --   --  0.4 0.4   ALKPHOS 77  --   --  68 67    < > = values in this interval not displayed. No results found for: LABPROT    ASSESSMENT / RECOMMENDATIONS:      1. Acute kidney injury, felt to be related to volume depletion and  consists of diarrhea and poor p.o. intake. CECILY resolving with BUN today of 72 and creatinine down to 2.2.  2.  Chronic kidney disease, stage III. The patient with kidney  transplant. Baseline creatinine 1.5.  3. Immunosuppressant host, chronically on cyclosporin 100 mg b.i.d.,  CellCept 1000 mg b.i.d., and prednisone 5 mg. 4.  Sepsis. Need to rule out a C. diff. infection as the patient is  reporting diarrhea. 5.  Obesity.   6.  Hypotension.     PLAN:    Cr down to 1.6 close to baseline   Acidosis resolved     -change iv fluids to LR at 60 cc/hr   -bmp in am     Electronically signed by Ya Read MD on 1/21/2022 at 6:58 PM

## 2022-01-21 NOTE — PROGRESS NOTES
Progress Note  Date:2022       Room:0515/0515-02  Patient Clarisse Escoto     YOB: 1955     Age:66 y.o. Subjective    Subjective:  Symptoms:  Stable. (Pt very lethargic  , unresponsive , just moans ). Diet:  NPO (unable to eat due to being groggy , NG tube in place ). Activity level: Impaired due to weakness. Pain:  She reports no pain. Review of Systems  Objective         Vitals Last 24 Hours:  TEMPERATURE:  Temp  Av.1 °F (37.3 °C)  Min: 98.9 °F (37.2 °C)  Max: 99.2 °F (37.3 °C)  RESPIRATIONS RANGE: Resp  Av.7  Min: 12  Max: 21  PULSE OXIMETRY RANGE: SpO2  Av.8 %  Min: 93 %  Max: 98 %  PULSE RANGE: Pulse  Av.3  Min: 82  Max: 86  BLOOD PRESSURE RANGE: Systolic (87LTI), LTE:225 , Min:106 , MOM:235   ; Diastolic (00QIC), UQJ:68, Min:55, Max:69    I/O (24Hr): Intake/Output Summary (Last 24 hours) at 2022 2106  Last data filed at 2022 1858  Gross per 24 hour   Intake 670 ml   Output 1550 ml   Net -880 ml     Objective:  General Appearance: In no acute distress (drowzy and very lethargic  ,on bipap , looks ill ). Vital signs: (most recent): Blood pressure 122/69, pulse 86, temperature 99.2 °F (37.3 °C), temperature source Axillary, resp. rate 20, height 5' 2\" (1.575 m), weight 216 lb (98 kg), SpO2 93 %. Vital signs are normal.    Output: Producing urine. HEENT: Normal HEENT exam.    Lungs: There are decreased breath sounds. (Coarse ronchi bilateral )  Heart: Normal rate. Regular rhythm. Positive for murmur. Abdomen: Abdomen is soft and distended. (Obese ). Hypoactive bowel sounds. (Incisions from laparoscopy , glued and clean ). Extremities: (Edema +1   bilateral both LL )  Neurological: (Very drowzy/ unresponsive ). Pupils:  Pupils are equal, round, and reactive to light.       Labs/Imaging/Diagnostics    Labs:  CBC:  Recent Labs     22  0128 22  0128 22  0527 22  1740 22  0634   WBC 20.5*  --  21.4*  --  18.5*   RBC 3.67  --  3.78  --  3.40*   HGB 11.2*  --  11.6  --  10.5*   HCT 39.1   < > 39.6 37.3 36.1   .5*  --  104.8*  --  106.2*   RDW 15.6*  --  15.7*  --  15.9*     --  96*  --  113*    < > = values in this interval not displayed. CHEMISTRIES:  Recent Labs     01/19/22  0128 01/19/22  0128 01/19/22  0527 01/19/22  0700 01/20/22  0634     --  145  --  147*   K 5.2*   < > 8.5* 5.3* 4.2   *  --  118*  --  113*   CO2 20*  --  14*  --  26   BUN 72*  --  77*  --  61*   CREATININE 1.8*  --  1.7*  --  1.6*   GLUCOSE 132*  --  110*  --  112*   PHOS 3.0  --  3.1  --  2.2*   MG 2.2  --  2.3  --  2.1    < > = values in this interval not displayed. PT/INR:No results for input(s): PROTIME, INR in the last 72 hours. APTT:No results for input(s): APTT in the last 72 hours. LIVER PROFILE:  Recent Labs     01/19/22  0128 01/19/22  0527 01/20/22  0634   AST 10 24 10   ALT 10 12 6   BILITOT 0.5 0.4 0.4   ALKPHOS 73 77 68       Imaging Last 24 Hours:  CT ABDOMEN PELVIS WO CONTRAST Additional Contrast? None    Result Date: 1/10/2022  EXAMINATION: CT OF THE ABDOMEN AND PELVIS WITHOUT CONTRAST 1/10/2022 6:56 pm TECHNIQUE: CT of the abdomen and pelvis was performed without the administration of intravenous contrast. Multiplanar reformatted images are provided for review. Dose modulation, iterative reconstruction, and/or weight based adjustment of the mA/kV was utilized to reduce the radiation dose to as low as reasonably achievable. COMPARISON: CT abdomen pelvis 11/17/2021 HISTORY: ORDERING SYSTEM PROVIDED HISTORY: evaluate for diarrhea TECHNOLOGIST PROVIDED HISTORY: Reason for exam:->evaluate for diarrhea Additional Contrast?->None Decision Support Exception - unselect if not a suspected or confirmed emergency medical condition->Emergency Medical Condition (MA) FINDINGS: Lower Chest: Atherosclerotic disease. Atrophic kidneys with multiple cysts.   Some of the presumed cysts appear hyperdense. Subcentimeter foci in the kidneys are too small to accurately characterize. There is a transplant kidney in the right lower quadrant. There is no hydronephrosis of the transplant kidney. Decreased perinephric stranding above the transplant kidney. Unremarkable bladder. Unremarkable appearance of the reproductive organs. No abdominal aortic aneurysm. Atherosclerotic disease. No evidence of cholecystitis or pancreatitis. No free air or significant free fluid. No evidence of bowel obstruction. Nondilated appendix. No significant fluid within the large bowel. Small fat containing umbilical hernia. Mild degenerative changes of the spine. Presumed surgical scars in the anterior abdominal wall. No significant fluid within the large bowel. No hydronephrosis of the transplant kidneys. The native kidneys are atrophic and have multiple intermediate density cysts which should be further evaluated with nonemergent ultrasound. Atherosclerotic disease. XR CHEST PORTABLE    Result Date: 1/11/2022  EXAMINATION: ONE XRAY VIEW OF THE CHEST 1/11/2022 9:11 am COMPARISON: None. HISTORY: ORDERING SYSTEM PROVIDED HISTORY: shortness of breath TECHNOLOGIST PROVIDED HISTORY: Reason for exam:->shortness of breath FINDINGS: The heart is enlarged. There are no findings of failure. The lungs are clear. There is no focal infiltrate or effusion. 1. Stable cardiomegaly. There is no evidence of failure or pneumonia. Assessment//Plan           Hospital Problems           Last Modified POA    CECILY (acute kidney injury) ([facility] [address] 1/11/2022 Yes    Ischemic bowel disease ([facility] [address] 1/15/2022 Yes        Assessment:    Condition: In stable condition. Improving.    (Sepsis immunocompromised host ,    Cefepime d/c  and  Started on zosyn on zyvox , and flagyl , ct scan abdomen , inflammation in the descending and sigmoid colon s/p laparoscopy by dr Javan Sanchez , no sign of ischemic bowel  Urine enterococcus fecalis on zyvox , Blood culture negative  ,  resp film array negative   Bilateral LL pneumonia  On zosyn , but still  Very lethargic , ABG metaolic and resp acidosis , continue bipap ,   Ct scan of the abdomen ? Enteritis , colonic distention no ischemic bowel ,pneumatosis , was taken by dr Minh Marshall 1/15  possible ischemic bowel  Laparoscopy , but was clear .,   Ileus due to sepsis   Hypernatremia add h20 flush per ng 300 cc q6 hours , , was on hco3 drip   Will start TF very small dose  Today , was not started yesterday ? Cause     Somnolence , and delirium ? Sepsis  Bilateral  pneumonia also on bipap , on zosyn now     Leukocytosis secondary to above ,      Acute on chronic renal failure , renal function better with hydration , d/c IV fluids creatinine upon admission was 2.5 , better , labs today  Better 1.6   S/p renal transplant in the past ,     HTN bp optimal  On metorpolol    Copd on aerosol treatment as needed     Bipolar controlled on  pristiq , hold clozaril    Hypothyroidism continue synthroid,     ? Viral meningitis , discussed with dr Chelo Antony ? Herpes simplex     2 d echo done today good EF 60-65 %      await transfer to [facility]     Poor prognosis       ).        Electronically signed by William Hernandez MD on 1/12/22 at 6:29 PM EST

## 2022-01-21 NOTE — PROGRESS NOTES
GENERAL SURGERY  DAILY PROGRESS NOTE  1/21/2022    Chief Complaint   Patient presents with    Fatigue     patient attempted to go to pcp today and didnt have the strength. came here instead. patient states sepsis prior to thanksgiving requiring admission and rehab. Subjective:  Pt on BiPAP and lethargic. Tube feeds running at trickle. No acute issues overnight. Objective:  /64   Pulse 100   Temp 99 °F (37.2 °C) (Axillary)   Resp 25   Ht 5' 2\" (1.575 m)   Wt 216 lb (98 kg)   SpO2 94%   BMI 39.51 kg/m²     GENERAL:  Laying in bed, lethargic, no apparent distress  HEAD: Normocephalic, atraumatic  EYES: No sclera icterus, pupils equal  LUNGS:  On BiPAP  CARDIOVASCULAR:  RR and normotensive  ABDOMEN:  Soft, incisions c/d/i, appropriately TTP around incisions, non-distended. NG tube with tube feeds running  EXTREMITIES: No edema or swelling  SKIN: Warm and dry    Assessment/Plan:  73 y.o. female with sepsis and reported pneumatosis of the colon on CT A/P s/p diagnostic laparoscopy 1/15 which was negative.  Now with colitis.    - Okay to advance TFs from surgical POV  - No signs of intra-abdominal causes of sepsis upon diagnostic laparoscopy  - Continue abx  - Monitor abdominal exam  - Pneumatosis of the colon may be present but is benign as there was no necrotic or ischemic bowel  - Awaiting transfer to [facility]    Electronically signed by Yolande Sanchez MD on 1/21/2022 at 6:29 AM

## 2022-01-21 NOTE — CARE COORDINATION
1/21/22 1157 CM note: COVID (-) 1/11/22. Continuous bipap FIO2 35%. Pt remains lethargic. Pt has been accepted at [facility] but awaiting a bed. Dr Moreno Mills requested charge nurse Shruti contact [facility] to see if another hospital ([address]) can accept pt and have bed availability. Once patient is medically stable pts son has made the following LTAC/SNF choices: 1) [facility] are following but they CAN NOT get C[facility] if needed at dc  2) [facility]. [facility] Col[facility] liaison, is also following if SNF is appropriate at dc. PICC placed 1/19. Pt on IV zosyn and diflucan. NG/TF. CM will continue to follow for dc planning.  Electronically signed by Marium Hess RN on 1/21/2022 at 12:19 PM

## 2022-01-21 NOTE — PROGRESS NOTES
Called  to update her on 3-4+ pitting edema in BLE. Crackles in bilateral lower lungs. IV fluids stopped and 20mg IV furosemide given. Pt on BiPAP, responds to pain, unable to follow commands.

## 2022-01-21 NOTE — PROGRESS NOTES
[facility]    [address] [place] [address]  [place], [address]  Phone [phone]   [phone]      Admit Date: 1/10/2022  6:12 PM  Pt Name: Young Covarrubias  MRN: [id number]  : 1955  Reason for Consult:    Chief Complaint   Patient presents with    Fatigue     patient attempted to go to pcp today and didnt have the strength. came here instead. patient states sepsis prior to thanksgiving requiring admission and rehab. Requesting Physician:  Tracie Brar MD  PCP: Tracie Brar MD  History Obtained From:  patient, chart   ID consulted for CECILY (acute kidney injury) ([address] [N17.9]  on hospital day        Chief Complaint   Patient presents with    Fatigue     patient attempted to go to pcp today and didnt have the strength. came here instead. patient states sepsis prior to thanksgiving requiring admission and rehab. HISTORYOF PRESENT ILLNESS   Young Covarrubias is a 73 y.o. female who presents with   has a past medical history of Abnormal EKG, Acute gout involving toe of right foot, Acute gout involving toe of right foot, Acute on chronic combined systolic (congestive) and diastolic (congestive) heart failure ([address] Cancer ([address] Cerebrovascular disease, Clotting disorder ([address] Depression, Hyperlipidemia, Hypertension, Hyperthyroidism, Leg swelling, Lymphedema of both lower extremities, Peripheral vascular disease ([address] Renal failure, Thrombophlebitis, and Thyroid disease. Fatigue  Associated symptoms include fatigue.      PT FOLLOWED UP WITH ID ON  S/P ATBX  STARTED ON FLUCONAZOLE/MYCOSTSATIN POWDER FOR INTERTRIGO   PT COMES IN NOW WITH FATIGUE DEC APPETITE NAUSEA   TMAX99.4 ON RA  CR2.5 WBC17.9  COVID NEG   BLOOD CX PENDING   ATBX was initiated   Not interactive  denies f/c  Just fatigue    Dos  22   In bed bipap moans unable to obtain ros  Spoke with Dr Lauren Mann and Dr Bernard Gill   Eneu248.8 cr1.6  1/20/2022  PT IS SUPINE BIPAP NGT NOT STAYING AWAKE OVERALL EDEMA   T99 FIO2 40%  CR1.6 WBC18.5 [id number]    1/19/2022  Afebrile bipap40%  Cr1.7 wbc21.4 plt96  Spoke with Dr Guilherme Edwards  1/18/2022  bipap still lethargic  ngt  11/7/2022  BIPAP LETHARGIC DOES RESPONDS HAS NGT  1/16/2022   ptis in bed supine 6L afebrile   Barely wakes up   Wbc20.2 cr1.5    Urine cx E faecalis   Blood cx ngtd   S/p PROCEDURE:  Diagnostic laparoscopy with lysis of adhesions. No signs of ischemia. Intra-abdominal  adhesions. Minimal intra-abdominal fluid. No other signs of  intra-abdominal pathology.     REVIEW OF SYSTEMS     CONSTITUTIONAL:   as in hpi     CURRENT MEDICATIONS     Current Facility-Administered Medications:     thiamine (B-1) injection 100 mg, 100 mg, IntraVENous, Daily, Lakeshia Toth MD, 100 mg at 01/21/22 1228    acyclovir (ZOVIRAX) 500 mg in dextrose 5 % 100 mL IVPB, 5 mg/kg, IntraVENous, Q8H, Haider Dutta MD, Last Rate: 100 mL/hr at 01/21/22 1510, 500 mg at 01/21/22 1510    lactated ringers infusion, , IntraVENous, Continuous, Rajendra Solano MD, Stopped at 01/21/22 0830    lidocaine PF 1 % injection 5 mL, 5 mL, IntraDERmal, Once, Merlyn Holly MD    sodium chloride flush 0.9 % injection 5-40 mL, 5-40 mL, IntraVENous, 2 times per day, Merlyn Holly MD, 10 mL at 01/21/22 4584    sodium chloride flush 0.9 % injection 5-40 mL, 5-40 mL, IntraVENous, PRN, Sonia Ramirez MD    0.9 % sodium chloride infusion, 25 mL, IntraVENous, PRN, Merlyn Holly MD    heparin flush 100 UNIT/ML injection 300 Units, 3 mL, IntraVENous, 2 times per day, Merlyn Holly MD, 300 Units at 01/21/22 0851    heparin flush 100 UNIT/ML injection 300 Units, 3 mL, IntraCATHeter, PRN, Merlyn Holly MD    perflutren lipid microspheres (DEFINITY) injection 1.65 mg, 1.5 mL, IntraVENous, ONCE PRN, Rafa Renteria MD    piperacillin-tazobactam (ZOSYN) 3,375 mg in dextrose 5 % 50 mL IVPB extended infusion (mini-bag), 3,375 mg, IntraVENous, Q8H, Olga Smith MD, Stopped at 01/21/22 1319    ipratropium-albuterol (DUONEB) nebulizer solution 3 mL, 3 mL, Inhalation, 4x daily, Juan Mesa MD, 3 mL at 01/21/22 1344    pantoprazole (PROTONIX) injection 40 mg, 40 mg, IntraVENous, BID, 40 mg at 01/21/22 0849 **AND** sodium chloride (PF) 0.9 % injection 10 mL, 10 mL, IntraVENous, BID, Kateryna Zarco MD, 10 mL at 01/21/22 0849    fluconazole (DIFLUCAN) in 0.9 % sodium chloride IVPB 400 mg, 400 mg, IntraVENous, Q24H, Olga Smith MD, Last Rate: 100 mL/hr at 01/21/22 1510, 400 mg at 01/21/22 1510    metoprolol tartrate (LOPRESSOR) tablet 50 mg, 50 mg, Oral, BID, Susannah Ramirez MD, 50 mg at 01/21/22 0849    docusate (COLACE) 50 MG/5ML liquid 100 mg, 100 mg, Oral, BID, Susannah Ramirez MD, 100 mg at 01/21/22 0901    heparin (porcine) injection 5,000 Units, 5,000 Units, SubCUTAneous, BID, Ivana Min MD, 5,000 Units at 01/21/22 0849    ondansetron (ZOFRAN-ODT) disintegrating tablet 4 mg, 4 mg, Oral, Q8H PRN, 4 mg at 01/11/22 1806 **OR** ondansetron (ZOFRAN) injection 4 mg, 4 mg, IntraVENous, Q6H PRN, Ivana Min MD    polyethylene glycol [facility]) packet 17 g, 17 g, Oral, Daily PRN, Ivana Min MD, 17 g at 01/13/22 1611    acetaminophen (TYLENOL) tablet 650 mg, 650 mg, Oral, Q6H PRN, 650 mg at 01/20/22 1021 **OR** acetaminophen (TYLENOL) suppository 650 mg, 650 mg, Rectal, Q6H PRN, Ivana Min MD    predniSONE (DELTASONE) tablet 5 mg, 5 mg, Oral, Daily, Ivana Min MD, 5 mg at 01/21/22 0901    sodium chloride flush 0.9 % injection 5-40 mL, 5-40 mL, IntraVENous, PRN, Ivana Min MD, 10 mL at 01/17/22 1601    0.9 % sodium chloride infusion, 25 mL, IntraVENous, PRN, Ivana Min MD, Last Rate: 100 mL/hr at 01/16/22 0035, 25 mL at 01/16/22 0035    heparin flush 100 UNIT/ML injection 100 Units, 1 mL, IntraVENous, 2 times per day, Ivana Min MD, 100 Units at 01/21/22 0850    heparin flush 100 UNIT/ML injection 100 Units, 1 mL, IntraCATHeter, PRN, Shahid Lowe MD    allopurinol (ZYLOPRIM) tablet 100 mg, 100 mg, Oral, Daily, Shahid Lowe MD, 100 mg at 01/21/22 0849    budesonide (PULMICORT) nebulizer suspension 500 mcg, 500 mcg, Nebulization, BID, Shahid Lowe MD, 500 mcg at 07/49/93 4991    folic acid (FOLVITE) tablet 1 mg, 1 mg, Oral, Daily, Shahid Lowe MD, 1 mg at 01/21/22 2254    levothyroxine (SYNTHROID) tablet 50 mcg, 50 mcg, Oral, Daily, Shahid Lowe MD, 50 mcg at 01/21/22 0849    atorvastatin (LIPITOR) tablet 10 mg, 10 mg, Oral, Daily, Shahid Lowe MD, 10 mg at 01/21/22 0849    ondansetron (ZOFRAN-ODT) disintegrating tablet 4 mg, 4 mg, Oral, Once, Shahid Lowe MD  ALLERGIES     Macrodantin [nitrofurantoin macrocrystal] and Sulfa antibiotics  Immunization History   Administered Date(s) Administered    COVID-19, Pfizer Purple top, DILUTE for use, 12+ yrs, 30mcg/0.3mL dose 01/28/2021, 02/18/2021, 08/18/2021      Internal Administration   First Dose COVID-19, Pfizer Purple top, DILUTE for use, 12+ yrs, 30mcg/0.3mL dose  01/28/2021   Second Dose COVID-19, Pfizer Purple top, DILUTE for use, 12+ yrs, 30mcg/0.3mL dose   02/18/2021       Last COVID Lab SARS-CoV-2 (no units)   Date Value   01/20/2021 Not Detected     SARS-CoV-2 Antibody, Total (no units)   Date Value   01/13/2022 Non-Reactive     SARS-CoV-2, PCR (no units)   Date Value   01/11/2022 Not Detected     SARS-CoV-2, NAAT (no units)   Date Value   01/10/2022 Not Detected            PAST MEDICAL HISTORY     Past Medical History:   Diagnosis Date    Abnormal EKG     left bundle branch block    Acute gout involving toe of right foot 9/3/2020    Acute gout involving toe of right foot 9/3/2020    Acute on chronic combined systolic (congestive) and diastolic (congestive) heart failure (HCC)     Cancer [facility]     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    Cerebrovascular disease     Clotting disorder ([facility] 1985    thrombophlebitis after c section delivery    Depression     15 years followed by dr Jeromy Hubbard Hyperlipidemia     Hypertension     Hyperthyroidism     Leg swelling 9/3/2020    Lymphedema of both lower extremities 9/3/2020    Peripheral vascular disease ([facility]     Renal failure 11/2012    renal transplant    Thrombophlebitis     after c section delivery    Thyroid disease      SURGICAL HISTORY       Past Surgical History:   Procedure Laterality Date   [address]    one normal delivery    COLECTOMY N/A 1/15/2022    DIAGNOSTIC  LAPAROSCOPY LYSIS OF ADHESIONS performed by Alma Resendez MD at [address] [place] [address] Drive CATH LAB PROCEDURE  2006    normal coronaries and 1996 normal coronaries    DIALYSIS FISTULA CREATION  march and july 2012    phase one and two patient will start dialysis when needed   [address]    transfemoral venous bypass grafts    KIDNEY TRANSPLANT  2016    KIDNEY TRANSPLANT  2015    KIDNEY TRANSPLANT  2015    PARATHYROIDECTOMY  2006    left parathyroid  implant and parathyroidectomy     PHYSICAL EXAM       Vitals:    01/21/22 0225 01/21/22 0419 01/21/22 0804 01/21/22 1346   BP:  116/64 (!) 114/59    Pulse:  100 119    Resp: 28 25 29 23   Temp:  99 °F (37.2 °C) 100.8 °F (38.2 °C)    TempSrc:  Axillary Axillary    SpO2:  94% 96%    Weight:       Height:         Physical Exam     Constitutional/General:   IN BED on her side  Head: NC/AT  Pulmonary: Lungs  DEc   to auscultation bilaterally ant .   on  BIPAP  Cardiovascular:  Regular rate and rhythm   Abdomen: Soft, dec  BS. distension.  NT   NGT  Extremities:     OVERALL Edema  Skin: Warm and dry   Neurologic:    lethargic moans  RIGHT MIDLINE  MERCHANT      DIAGNOSTIC RESULTS   RADIOLOGY:   CT ABDOMEN PELVIS WO CONTRAST Additional Contrast? None    Result Date: 1/10/2022  EXAMINATION: CT OF THE ABDOMEN AND PELVIS WITHOUT CONTRAST 1/10/2022 6:56 pm TECHNIQUE: CT of the abdomen and pelvis was performed without the administration of intravenous contrast. Multiplanar reformatted images are provided for review. Dose modulation, iterative reconstruction, and/or weight based adjustment of the mA/kV was utilized to reduce the radiation dose to as low as reasonably achievable. COMPARISON: CT abdomen pelvis 11/17/2021 HISTORY: ORDERING SYSTEM PROVIDED HISTORY: evaluate for diarrhea TECHNOLOGIST PROVIDED HISTORY: Reason for exam:->evaluate for diarrhea Additional Contrast?->None Decision Support Exception - unselect if not a suspected or confirmed emergency medical condition->Emergency Medical Condition (MA) FINDINGS: Lower Chest: Atherosclerotic disease. Atrophic kidneys with multiple cysts. Some of the presumed cysts appear hyperdense. Subcentimeter foci in the kidneys are too small to accurately characterize. There is a transplant kidney in the right lower quadrant. There is no hydronephrosis of the transplant kidney. Decreased perinephric stranding above the transplant kidney. Unremarkable bladder. Unremarkable appearance of the reproductive organs. No abdominal aortic aneurysm. Atherosclerotic disease. No evidence of cholecystitis or pancreatitis. No free air or significant free fluid. No evidence of bowel obstruction. Nondilated appendix. No significant fluid within the large bowel. Small fat containing umbilical hernia. Mild degenerative changes of the spine. Presumed surgical scars in the anterior abdominal wall. No significant fluid within the large bowel. No hydronephrosis of the transplant kidneys. The native kidneys are atrophic and have multiple intermediate density cysts which should be further evaluated with nonemergent ultrasound. Atherosclerotic disease. XR CHEST PORTABLE    Result Date: 1/11/2022  EXAMINATION: ONE XRAY VIEW OF THE CHEST 1/11/2022 9:11 am COMPARISON: None.  HISTORY: ORDERING SYSTEM PROVIDED HISTORY: shortness of breath TECHNOLOGIST PROVIDED HISTORY: Reason for exam:->shortness of breath FINDINGS: The heart is enlarged. There are no findings of failure. The lungs are clear. There is no focal infiltrate or effusion. 1. Stable cardiomegaly. There is no evidence of failure or pneumonia. LABS  Recent Labs     01/19/22  0128 01/19/22  0128 01/19/22  0527 01/19/22  1740 01/20/22  0634   WBC 20.5*  --  21.4*  --  18.5*   HGB 11.2*  --  11.6  --  10.5*   HCT 39.1   < > 39.6 37.3 36.1   .5*  --  104.8*  --  106.2*     --  96*  --  113*    < > = values in this interval not displayed. Recent Labs     01/19/22  0527 01/19/22  0700 01/20/22  0634 01/20/22  0634 01/21/22  0540     --  147*  --  149*   K 8.5*   < > 4.2   < > 4.2   *   < > 113*   < > 112*   CO2 14*   < > 26   < > 29   BUN 77*  --  61*  --  55*   CREATININE 1.7*  --  1.6*  --  1.6*   GFRAA 36  --  39  --  39   LABGLOM 30  --  32  --  32   GLUCOSE 110*  --  112*  --  153*   PROT 5.1*  --  4.6*  --  5.3*   LABALBU 2.3*  --  2.2*  --  2.7*   CALCIUM 10.7*  --  9.9  --  10.1   BILITOT 0.4  --  0.4  --  0.4   ALKPHOS 77  --  68  --  67   AST 24  --  10  --  18   ALT 12  --  6  --  9    < > = values in this interval not displayed. No results for input(s): PROCAL in the last 72 hours. Lab Results   Component Value Date    CRP 34.6 (H) 11/17/2021     Lab Results   Component Value Date    SEDRATE 62 (H) 11/20/2021     No results found for: [id number]  Lab Results   Component Value Date    COVID19 Non-Reactive 01/13/2022    COVID19 Not Detected 01/11/2022     COVID-19/MARINA-COV2 LABS  Recent Labs     01/19/22  0128 01/19/22  0128 01/19/22  0527 01/20/22  0634 01/21/22  0540   FERRITIN 1,117  --   --   --   --    AST 10   < > 24 10 18   ALT 10   < > 12 6 9    < > = values in this interval not displayed.            MICROBIOLOGY:  Lab Results   Component Value Date    BC 5 Days no growth 01/11/2022    BC 5 Days no growth 11/17/2021    BC  12/17/2019     Refer to mg, Q24H     predniSONE (DELTASONE) tablet 5 mg, Daily       FOLLOW LABS WATCH CR   Check blood cx  restart acyclovir for persistent MS changes     Imaging and labs were reviewed per medical records       Thank you for involving me in the care of Koko Valenzuela. Please do not hesitate to call for any questions or concerns.          Electronically signed by Caprice Grey MD on 1/21/2022 at 4:02 PM

## 2022-01-21 NOTE — PROGRESS NOTES
Progress Note  Date:2022       Room:15/0515-02  Patient Sanjuana Barr     YOB: 1955     Age:66 y.o. Subjective    Subjective:  Symptoms:  Stable. (Pt very lethargic  , unresponsive , just moans ). Diet:  Dietary issues: unable to eat due to being groggy , NG tube in place , started TF. Activity level: Impaired due to weakness. Pain:  She reports no pain. Review of Systems  Objective         Vitals Last 24 Hours:  TEMPERATURE:  Temp  Av.4 °F (37.4 °C)  Min: 98.6 °F (37 °C)  Max: 100.8 °F (38.2 °C)  RESPIRATIONS RANGE: Resp  Av  Min: 20  Max: 29  PULSE OXIMETRY RANGE: SpO2  Av.8 %  Min: 93 %  Max: 96 %  PULSE RANGE: Pulse  Av.8  Min: 86  Max: 119  BLOOD PRESSURE RANGE: Systolic (66NRD), HZO:806 , Min:109 , CKE:161   ; Diastolic (97GMM), HLT:90, Min:59, Max:69    I/O (24Hr): Intake/Output Summary (Last 24 hours) at 2022 1811  Last data filed at 2022 1518  Gross per 24 hour   Intake 1371 ml   Output 1425 ml   Net -54 ml     Objective:  General Appearance: In no acute distress (drowzy and very lethargic  ,on bipap , looks ill ). Vital signs: (most recent): Blood pressure 109/63, pulse 106, temperature 98.6 °F (37 °C), temperature source Axillary, resp. rate 23, height 5' 2\" (1.575 m), weight 216 lb (98 kg), SpO2 96 %. Vital signs are normal.    Output: Producing urine. HEENT: Normal HEENT exam.    Lungs: There are decreased breath sounds. (Coarse ronchi bilateral )  Heart: Normal rate. Regular rhythm. Positive for murmur. Abdomen: Abdomen is soft and distended. (Obese ). Hypoactive bowel sounds. (Incisions from laparoscopy , glued and clean ). Extremities: (Edema +1   bilateral both LL )  Neurological: (Very drowzy/ unresponsive ). Pupils:  Pupils are equal, round, and reactive to light.       Labs/Imaging/Diagnostics    Labs:  CBC:  Recent Labs     22  0128 22  0128 22  0527 22  1740 01/20/22  0634   WBC 20.5*  --  21.4*  --  18.5*   RBC 3.67  --  3.78  --  3.40*   HGB 11.2*  --  11.6  --  10.5*   HCT 39.1   < > 39.6 37.3 36.1   .5*  --  104.8*  --  106.2*   RDW 15.6*  --  15.7*  --  15.9*     --  96*  --  113*    < > = values in this interval not displayed. CHEMISTRIES:  Recent Labs     01/19/22  0527 01/19/22  0527 01/19/22  0700 01/20/22  0634 01/21/22  0540     --   --  147* 149*   K 8.5*   < > 5.3* 4.2 4.2   *  --   --  113* 112*   CO2 14*  --   --  26 29   BUN 77*  --   --  61* 55*   CREATININE 1.7*  --   --  1.6* 1.6*   GLUCOSE 110*  --   --  112* 153*   PHOS 3.1  --   --  2.2* 2.0*   MG 2.3  --   --  2.1 2.2    < > = values in this interval not displayed. PT/INR:No results for input(s): PROTIME, INR in the last 72 hours. APTT:No results for input(s): APTT in the last 72 hours. LIVER PROFILE:  Recent Labs     01/19/22  0527 01/20/22  0634 01/21/22  0540   AST 24 10 18   ALT 12 6 9   BILITOT 0.4 0.4 0.4   ALKPHOS 77 68 67       Imaging Last 24 Hours:  CT ABDOMEN PELVIS WO CONTRAST Additional Contrast? None    Result Date: 1/10/2022  EXAMINATION: CT OF THE ABDOMEN AND PELVIS WITHOUT CONTRAST 1/10/2022 6:56 pm TECHNIQUE: CT of the abdomen and pelvis was performed without the administration of intravenous contrast. Multiplanar reformatted images are provided for review. Dose modulation, iterative reconstruction, and/or weight based adjustment of the mA/kV was utilized to reduce the radiation dose to as low as reasonably achievable. COMPARISON: CT abdomen pelvis 11/17/2021 HISTORY: ORDERING SYSTEM PROVIDED HISTORY: evaluate for diarrhea TECHNOLOGIST PROVIDED HISTORY: Reason for exam:->evaluate for diarrhea Additional Contrast?->None Decision Support Exception - unselect if not a suspected or confirmed emergency medical condition->Emergency Medical Condition (MA) FINDINGS: Lower Chest: Atherosclerotic disease. Atrophic kidneys with multiple cysts.   Some of the presumed cysts appear hyperdense. Subcentimeter foci in the kidneys are too small to accurately characterize. There is a transplant kidney in the right lower quadrant. There is no hydronephrosis of the transplant kidney. Decreased perinephric stranding above the transplant kidney. Unremarkable bladder. Unremarkable appearance of the reproductive organs. No abdominal aortic aneurysm. Atherosclerotic disease. No evidence of cholecystitis or pancreatitis. No free air or significant free fluid. No evidence of bowel obstruction. Nondilated appendix. No significant fluid within the large bowel. Small fat containing umbilical hernia. Mild degenerative changes of the spine. Presumed surgical scars in the anterior abdominal wall. No significant fluid within the large bowel. No hydronephrosis of the transplant kidneys. The native kidneys are atrophic and have multiple intermediate density cysts which should be further evaluated with nonemergent ultrasound. Atherosclerotic disease. XR CHEST PORTABLE    Result Date: 1/11/2022  EXAMINATION: ONE XRAY VIEW OF THE CHEST 1/11/2022 9:11 am COMPARISON: None. HISTORY: ORDERING SYSTEM PROVIDED HISTORY: shortness of breath TECHNOLOGIST PROVIDED HISTORY: Reason for exam:->shortness of breath FINDINGS: The heart is enlarged. There are no findings of failure. The lungs are clear. There is no focal infiltrate or effusion. 1. Stable cardiomegaly. There is no evidence of failure or pneumonia. Assessment//Plan           Hospital Problems           Last Modified POA    * (Principal) Coma ([facility] 1/21/2022 Yes    CECILY (acute kidney injury) ([facility] 1/11/2022 Yes    Ischemic bowel disease ([facility] 1/15/2022 Yes    Altered mental status 1/21/2022 Yes        Assessment:    Condition: In stable condition. Improving.    (Sepsis immunocompromised host ,    Cefepime d/c  and  Started on zosyn on zyvox , and flagyl , ct scan abdomen , inflammation in the descending and sigmoid colon s/p laparoscopy by dr Stephanie Peraza , no sign of ischemic bowel  Urine enterococcus fecalis on zyvox ,   Blood culture negative  ,  resp film array negative   Bilateral LL pneumonia  On zosyn , but still  Very lethargic , ABG metabolic and resp acidosis , continue bipap ,   Ct scan of the abdomen ? Enteritis , colonic distention no ischemic bowel ,pneumatosis , was taken by dr Stephanie Peraza 1/15  possible ischemic bowel  Laparoscopy , but was clear .,   Ileus due to sepsis   Hypernatremia add h20 flush per ng 300 cc q6 hours , , was on hco3 drip   Will start TF very small dose  Today , was not started yesterday ? Cause     Somnolence , and delirium ? Sepsis  Bilateral  pneumonia also on bipap , on zosyn now     Leukocytosis secondary to above ,      Acute on chronic renal failure , renal function better with hydration , d/c IV fluids creatinine upon admission was 2.5 , better , labs today  Better 1.6   S/p renal transplant in the past ,     HTN bp optimal  On metorpolol    Copd on aerosol treatment as needed     Bipolar controlled on  pristiq , hold clozaril    Hypothyroidism continue synthroid,     ? Viral meningitis , discussed with dr Chago Waters ? Herpes simplex  On acyclovir, now ,    2 d echo done today good EF 60-65 %      await transfer to [facility]     reached out again for the transfer and still no beds     Poor prognosis       ).        Electronically signed by Paige Farrell MD on 1/12/22 at 6:29 PM EST

## 2022-01-21 NOTE — PROGRESS NOTES
PROGRESS NOTE  By Noemy Talavera M.D. [facility]  Dr. Lisa Antunez M.D.,  Dr. Pankaj Stein M.D.,   Dr. Salima Scales D.O.,  Dr. Partha Black M.D.,  Dr. Hiram Malloy D.O.,  Cedrick Mulligan D.O. Spenser Dolan  73 y.o.  female    SUBJECTIVE:  Remains lethargic and on BiPAP. No family at bedside    OBJECTIVE:    BP (!) 114/59   Pulse 119   Temp 100.8 °F (38.2 °C) (Axillary)   Resp 23   Ht 5' 2\" (1.575 m)   Wt 216 lb (98 kg)   SpO2 96%   BMI 39.51 kg/m²     General: NAD/obese  female. Lethargic  HEENT: Moist oral mucosa. NG tube in place  Neck: Supple trachea midline  Chest:NIPPV  Cor: Regular tachycardia  Abd.: Soft and obese. Bowel sounds hypoactive. Surgical scars/incisions noted   Extr.:  BLE 2+ edema. Decreased muscle tone and bulk throughout  Skin: Warm and dry      DATA:    Monitor data reviewed -sinus tachycardia noted.        Lab Results   Component Value Date    WBC 18.5 01/20/2022    RBC 3.40 01/20/2022    HGB 10.5 01/20/2022    HCT 36.1 01/20/2022    .2 01/20/2022    MCH 30.9 01/20/2022    MCHC 29.1 01/20/2022    RDW 15.9 01/20/2022     01/20/2022    MPV 12.6 01/20/2022     Lab Results   Component Value Date     01/21/2022    K 4.2 01/21/2022    K 5.5 01/10/2022     01/21/2022    CO2 29 01/21/2022    BUN 55 01/21/2022    CREATININE 1.6 01/21/2022    CALCIUM 10.1 01/21/2022    PROT 5.3 01/21/2022    LABALBU 2.7 01/21/2022    BILITOT 0.4 01/21/2022    ALKPHOS 67 01/21/2022    AST 18 01/21/2022    ALT 9 01/21/2022     Lab Results   Component Value Date    LIPASE 19 11/17/2021     No results found for: AMYLASE      ASSESSMENT/PLAN:    1.  Abdominal distention patient is status post recent Diagnostic laparoscopy  -Possibly secondary to body habitus/adynamic ileus secondary to acute illness/recent laparoscopic procedure -cannot exclude Worcester's syndrome.  -Abdominal exam remains benign.   -General surgery on case -input appreciated  -Okay for liquid diet from GI POV     2. Chronic macrocytic anemia most likely due to hypothyroidism. Cannot rule out medication/bone marrow suppression.  - Patient does have history of hypothyroidism currently also taking mycophenolate  -H&H at baseline without evidence of overt bleed  -Iron study are within normal limits B12 studies within normal limit.  -Monitor H&H  -Continue PPI. Gastroenterology will follow as needed.    -No immediate plans for inpatient endoscopy at this time    3. Sepsis  -Per admitting     4.  Respiratory failure  -Patient requiring noninvasive positive pressure ventilation  -Per admitting/pulmonology     5. history of renal transplant  -Per admitting/nephrology     6.  Multiple comorbidities -COPD, HTN, HLD, BPD, COPD, CHF, hypothyroidism, etc.  -Per admitting/pertinent consultants    No immediate plans for intervention from GI POV. Will follow as needed in the hospital -please, call with questions/concerns. Thank you       Jose Elias Song MD  1/21/2022  2:01 PM    NOTE:  This report was transcribed using voice recognition software. Every effort was made to ensure accuracy; however, inadvertent computerized transcription errors may be present.

## 2022-01-22 ENCOUNTER — APPOINTMENT (OUTPATIENT)
Dept: GENERAL RADIOLOGY | Age: 67
DRG: 673 | End: 2022-01-22
Payer: MEDICARE

## 2022-01-22 LAB
ALBUMIN SERPL-MCNC: 2.3 G/DL (ref 3.5–5.2)
ALP BLD-CCNC: 64 U/L (ref 35–104)
ALT SERPL-CCNC: 15 U/L (ref 0–32)
ANION GAP SERPL CALCULATED.3IONS-SCNC: 11 MMOL/L (ref 7–16)
ANION GAP SERPL CALCULATED.3IONS-SCNC: 9 MMOL/L (ref 7–16)
ANION GAP SERPL CALCULATED.3IONS-SCNC: 9 MMOL/L (ref 7–16)
AST SERPL-CCNC: 39 U/L (ref 0–31)
BILIRUB SERPL-MCNC: 0.4 MG/DL (ref 0–1.2)
BUN BLDV-MCNC: 52 MG/DL (ref 6–23)
BUN BLDV-MCNC: 55 MG/DL (ref 6–23)
BUN BLDV-MCNC: 58 MG/DL (ref 6–23)
CALCIUM SERPL-MCNC: 10 MG/DL (ref 8.6–10.2)
CALCIUM SERPL-MCNC: 9.2 MG/DL (ref 8.6–10.2)
CALCIUM SERPL-MCNC: 9.6 MG/DL (ref 8.6–10.2)
CHLORIDE BLD-SCNC: 110 MMOL/L (ref 98–107)
CHLORIDE BLD-SCNC: 114 MMOL/L (ref 98–107)
CHLORIDE BLD-SCNC: 115 MMOL/L (ref 98–107)
CO2: 27 MMOL/L (ref 22–29)
CO2: 29 MMOL/L (ref 22–29)
CO2: 30 MMOL/L (ref 22–29)
CREAT SERPL-MCNC: 1.8 MG/DL (ref 0.5–1)
CREAT SERPL-MCNC: 1.9 MG/DL (ref 0.5–1)
CREAT SERPL-MCNC: 2 MG/DL (ref 0.5–1)
GFR AFRICAN AMERICAN: 30
GFR AFRICAN AMERICAN: 32
GFR AFRICAN AMERICAN: 34
GFR NON-AFRICAN AMERICAN: 25 ML/MIN/1.73
GFR NON-AFRICAN AMERICAN: 26 ML/MIN/1.73
GFR NON-AFRICAN AMERICAN: 28 ML/MIN/1.73
GLUCOSE BLD-MCNC: 118 MG/DL (ref 74–99)
GLUCOSE BLD-MCNC: 144 MG/DL (ref 74–99)
GLUCOSE BLD-MCNC: 171 MG/DL (ref 74–99)
HERPES TYPE 1/2 IGM COMBINED: 0.63 IV
HERPES TYPE I/II IGG COMBINED: >22.4 IV
HSV 1 GLYCOPROTEIN G AB IGG: 36.3 IV
HSV 2 GLYCOPROTEIN G AB IGG: 12.7 IV
MAGNESIUM: 2.1 MG/DL (ref 1.6–2.6)
PHOSPHORUS: 2.7 MG/DL (ref 2.5–4.5)
POTASSIUM SERPL-SCNC: 4.2 MMOL/L (ref 3.5–5)
POTASSIUM SERPL-SCNC: 4.2 MMOL/L (ref 3.5–5)
POTASSIUM SERPL-SCNC: 4.3 MMOL/L (ref 3.5–5)
SARS-COV-2, PCR: DETECTED
SODIUM BLD-SCNC: 148 MMOL/L (ref 132–146)
SODIUM BLD-SCNC: 153 MMOL/L (ref 132–146)
SODIUM BLD-SCNC: 153 MMOL/L (ref 132–146)
TOTAL PROTEIN: 5.1 G/DL (ref 6.4–8.3)

## 2022-01-22 PROCEDURE — 6360000002 HC RX W HCPCS: Performed by: SPECIALIST

## 2022-01-22 PROCEDURE — 94660 CPAP INITIATION&MGMT: CPT

## 2022-01-22 PROCEDURE — 94640 AIRWAY INHALATION TREATMENT: CPT

## 2022-01-22 PROCEDURE — 6360000002 HC RX W HCPCS: Performed by: HOSPITALIST

## 2022-01-22 PROCEDURE — 2580000003 HC RX 258: Performed by: NURSE PRACTITIONER

## 2022-01-22 PROCEDURE — 6370000000 HC RX 637 (ALT 250 FOR IP): Performed by: SURGERY

## 2022-01-22 PROCEDURE — 2580000003 HC RX 258: Performed by: INTERNAL MEDICINE

## 2022-01-22 PROCEDURE — 71045 X-RAY EXAM CHEST 1 VIEW: CPT

## 2022-01-22 PROCEDURE — 6360000002 HC RX W HCPCS: Performed by: PSYCHIATRY & NEUROLOGY

## 2022-01-22 PROCEDURE — 6360000002 HC RX W HCPCS: Performed by: SURGERY

## 2022-01-22 PROCEDURE — 36415 COLL VENOUS BLD VENIPUNCTURE: CPT

## 2022-01-22 PROCEDURE — 6370000000 HC RX 637 (ALT 250 FOR IP): Performed by: INTERNAL MEDICINE

## 2022-01-22 PROCEDURE — 2580000003 HC RX 258: Performed by: SPECIALIST

## 2022-01-22 PROCEDURE — 80048 BASIC METABOLIC PNL TOTAL CA: CPT

## 2022-01-22 PROCEDURE — A4216 STERILE WATER/SALINE, 10 ML: HCPCS | Performed by: SPECIALIST

## 2022-01-22 PROCEDURE — 83735 ASSAY OF MAGNESIUM: CPT

## 2022-01-22 PROCEDURE — 80053 COMPREHEN METABOLIC PANEL: CPT

## 2022-01-22 PROCEDURE — 2700000000 HC OXYGEN THERAPY PER DAY

## 2022-01-22 PROCEDURE — A4216 STERILE WATER/SALINE, 10 ML: HCPCS | Performed by: HOSPITALIST

## 2022-01-22 PROCEDURE — 2500000003 HC RX 250 WO HCPCS: Performed by: INTERNAL MEDICINE

## 2022-01-22 PROCEDURE — 2500000003 HC RX 250 WO HCPCS: Performed by: SPECIALIST

## 2022-01-22 PROCEDURE — 2580000003 HC RX 258: Performed by: HOSPITALIST

## 2022-01-22 PROCEDURE — 6360000002 HC RX W HCPCS: Performed by: INTERNAL MEDICINE

## 2022-01-22 PROCEDURE — C9113 INJ PANTOPRAZOLE SODIUM, VIA: HCPCS | Performed by: HOSPITALIST

## 2022-01-22 PROCEDURE — 84100 ASSAY OF PHOSPHORUS: CPT

## 2022-01-22 PROCEDURE — 2500000003 HC RX 250 WO HCPCS: Performed by: NURSE PRACTITIONER

## 2022-01-22 PROCEDURE — 2060000000 HC ICU INTERMEDIATE R&B

## 2022-01-22 RX ORDER — DEXAMETHASONE 6 MG/1
6 TABLET ORAL DAILY
Status: DISCONTINUED | OUTPATIENT
Start: 2022-01-23 | End: 2022-01-25 | Stop reason: HOSPADM

## 2022-01-22 RX ORDER — 0.9 % SODIUM CHLORIDE 0.9 %
30 INTRAVENOUS SOLUTION INTRAVENOUS PRN
Status: DISCONTINUED | OUTPATIENT
Start: 2022-01-22 | End: 2022-01-25 | Stop reason: HOSPADM

## 2022-01-22 RX ORDER — DEXTROSE AND SODIUM CHLORIDE 5; .45 G/100ML; G/100ML
INJECTION, SOLUTION INTRAVENOUS CONTINUOUS
Status: DISCONTINUED | OUTPATIENT
Start: 2022-01-22 | End: 2022-01-22

## 2022-01-22 RX ORDER — SODIUM CHLORIDE 9 MG/ML
INJECTION, SOLUTION INTRAVENOUS EVERY 12 HOURS
Status: DISCONTINUED | OUTPATIENT
Start: 2022-01-23 | End: 2022-01-25 | Stop reason: ALTCHOICE

## 2022-01-22 RX ADMIN — PIPERACILLIN SODIUM AND TAZOBACTAM SODIUM 3375 MG: 3; .375 INJECTION, POWDER, LYOPHILIZED, FOR SOLUTION INTRAVENOUS at 01:47

## 2022-01-22 RX ADMIN — HEPARIN 300 UNITS: 100 SYRINGE at 20:17

## 2022-01-22 RX ADMIN — HEPARIN SODIUM 5000 UNITS: 5000 INJECTION INTRAVENOUS; SUBCUTANEOUS at 21:13

## 2022-01-22 RX ADMIN — HEPARIN SODIUM 5000 UNITS: 5000 INJECTION INTRAVENOUS; SUBCUTANEOUS at 09:47

## 2022-01-22 RX ADMIN — ACYCLOVIR SODIUM 500 MG: 50 INJECTION, SOLUTION INTRAVENOUS at 14:25

## 2022-01-22 RX ADMIN — BUDESONIDE 500 MCG: 0.5 INHALANT RESPIRATORY (INHALATION) at 06:23

## 2022-01-22 RX ADMIN — METOPROLOL TARTRATE 50 MG: 50 TABLET, FILM COATED ORAL at 09:24

## 2022-01-22 RX ADMIN — DOCUSATE SODIUM 100 MG: 50 LIQUID ORAL at 21:13

## 2022-01-22 RX ADMIN — LEVOTHYROXINE SODIUM 50 MCG: 0.05 TABLET ORAL at 09:24

## 2022-01-22 RX ADMIN — BUDESONIDE 500 MCG: 0.5 INHALANT RESPIRATORY (INHALATION) at 18:42

## 2022-01-22 RX ADMIN — IPRATROPIUM BROMIDE AND ALBUTEROL SULFATE 3 ML: .5; 3 SOLUTION RESPIRATORY (INHALATION) at 18:42

## 2022-01-22 RX ADMIN — PANTOPRAZOLE SODIUM 40 MG: 40 INJECTION, POWDER, FOR SOLUTION INTRAVENOUS at 21:13

## 2022-01-22 RX ADMIN — THIAMINE HYDROCHLORIDE 100 MG: 100 INJECTION, SOLUTION INTRAMUSCULAR; INTRAVENOUS at 09:24

## 2022-01-22 RX ADMIN — DOCUSATE SODIUM 100 MG: 50 LIQUID ORAL at 09:23

## 2022-01-22 RX ADMIN — PIPERACILLIN SODIUM AND TAZOBACTAM SODIUM 3375 MG: 3; .375 INJECTION, POWDER, LYOPHILIZED, FOR SOLUTION INTRAVENOUS at 21:13

## 2022-01-22 RX ADMIN — ALLOPURINOL 100 MG: 100 TABLET ORAL at 09:24

## 2022-01-22 RX ADMIN — IPRATROPIUM BROMIDE AND ALBUTEROL SULFATE 3 ML: .5; 3 SOLUTION RESPIRATORY (INHALATION) at 09:43

## 2022-01-22 RX ADMIN — SODIUM CHLORIDE 100 ML/HR: 234 INJECTION, SOLUTION, CONCENTRATE INTRAVENOUS at 11:56

## 2022-01-22 RX ADMIN — IPRATROPIUM BROMIDE AND ALBUTEROL SULFATE 3 ML: .5; 3 SOLUTION RESPIRATORY (INHALATION) at 06:23

## 2022-01-22 RX ADMIN — ATORVASTATIN CALCIUM 10 MG: 10 TABLET, FILM COATED ORAL at 09:24

## 2022-01-22 RX ADMIN — FLUCONAZOLE 400 MG: 2 INJECTION INTRAVENOUS at 16:07

## 2022-01-22 RX ADMIN — ACETAMINOPHEN 650 MG: 325 TABLET ORAL at 09:48

## 2022-01-22 RX ADMIN — PREDNISONE 5 MG: 5 TABLET ORAL at 09:24

## 2022-01-22 RX ADMIN — IPRATROPIUM BROMIDE AND ALBUTEROL SULFATE 3 ML: .5; 3 SOLUTION RESPIRATORY (INHALATION) at 14:11

## 2022-01-22 RX ADMIN — DAPTOMYCIN 400 MG: 500 INJECTION, POWDER, LYOPHILIZED, FOR SOLUTION INTRAVENOUS at 14:45

## 2022-01-22 RX ADMIN — PIPERACILLIN SODIUM AND TAZOBACTAM SODIUM 3375 MG: 3; .375 INJECTION, POWDER, LYOPHILIZED, FOR SOLUTION INTRAVENOUS at 09:23

## 2022-01-22 RX ADMIN — PANTOPRAZOLE SODIUM 40 MG: 40 INJECTION, POWDER, FOR SOLUTION INTRAVENOUS at 09:23

## 2022-01-22 RX ADMIN — SODIUM CHLORIDE, PRESERVATIVE FREE 10 ML: 5 INJECTION INTRAVENOUS at 21:13

## 2022-01-22 RX ADMIN — SODIUM CHLORIDE, PRESERVATIVE FREE 10 ML: 5 INJECTION INTRAVENOUS at 09:23

## 2022-01-22 RX ADMIN — ACYCLOVIR SODIUM 500 MG: 50 INJECTION, SOLUTION INTRAVENOUS at 05:57

## 2022-01-22 RX ADMIN — SODIUM CHLORIDE 150 ML/HR: 234 INJECTION, SOLUTION, CONCENTRATE INTRAVENOUS at 21:25

## 2022-01-22 RX ADMIN — FOLIC ACID 1 MG: 1 TABLET ORAL at 09:24

## 2022-01-22 RX ADMIN — REMDESIVIR 200 MG: 100 INJECTION, POWDER, LYOPHILIZED, FOR SOLUTION INTRAVENOUS at 20:14

## 2022-01-22 RX ADMIN — HEPARIN 100 UNITS: 100 SYRINGE at 20:16

## 2022-01-22 RX ADMIN — Medication 10 ML: at 21:14

## 2022-01-22 RX ADMIN — METOPROLOL TARTRATE 50 MG: 50 TABLET, FILM COATED ORAL at 21:12

## 2022-01-22 ASSESSMENT — PAIN SCALES - WONG BAKER
WONGBAKER_NUMERICALRESPONSE: 0

## 2022-01-22 ASSESSMENT — PAIN SCALES - GENERAL
PAINLEVEL_OUTOF10: 0

## 2022-01-22 NOTE — PROGRESS NOTES
[facility]    [address] [place] [address] 80  [place], [address]  Phone [phone]   [phone]      Admit Date: 1/10/2022  6:12 PM  Pt Name: Henry Guerrero  MRN: [id number]  : 1955  Reason for Consult:    Chief Complaint   Patient presents with    Fatigue     patient attempted to go to pcp today and didnt have the strength. came here instead. patient states sepsis prior to thanksgiving requiring admission and rehab. Requesting Physician:  Erica Levi MD  PCP: Erica Levi MD  History Obtained From:  patient, chart   ID consulted for CECILY (acute kidney injury) ([address] [N17.9]  on hospital day [address]       Chief Complaint   Patient presents with    Fatigue     patient attempted to go to pcp today and didnt have the strength. came here instead. patient states sepsis prior to thanksgiving requiring admission and rehab. HISTORYOF PRESENT ILLNESS   Henry Guerrero is a 73 y.o. female who presents with   has a past medical history of Abnormal EKG, Acute gout involving toe of right foot, Acute gout involving toe of right foot, Acute on chronic combined systolic (congestive) and diastolic (congestive) heart failure ([address] Cancer ([address] Cerebrovascular disease, Clotting disorder ([address] Depression, Hyperlipidemia, Hypertension, Hyperthyroidism, Leg swelling, Lymphedema of both lower extremities, Peripheral vascular disease ([address] Renal failure, Thrombophlebitis, and Thyroid disease. Fatigue  Associated symptoms include fatigue.      PT FOLLOWED UP WITH ID ON  S/P ATBX  STARTED ON FLUCONAZOLE/MYCOSTSATIN POWDER FOR INTERTRIGO   PT COMES IN NOW WITH FATIGUE DEC APPETITE NAUSEA   TMAX99.4 ON RA  CR2.5 WBC17.9  COVID NEG   BLOOD CX PENDING   ATBX was initiated   Not interactive  denies f/c  Just fatigue    Dos  22   Nurse present  Pt on bipap ngt moans platt snot awake  Temp 100.3 hifn555.8  2022  In bed bipap moans unable to obtain ros  Spoke with Dr Mykel Crum and Dr Joan Valles   Hwtq106.8 cr1.6 [id number]   1/20/2022  PT IS SUPINE BIPAP NGT NOT STAYING AWAKE OVERALL EDEMA   T99 FIO2 40%  CR1.6 WBC18.5 [id number]    1/19/2022  Afebrile bipap40%  Cr1.7 wbc21.4 plt96  Spoke with Dr Joan Valles  1/18/2022  bipap still lethargic  ngt  11/7/2022  BIPAP LETHARGIC DOES RESPONDS HAS NGT  1/16/2022   ptis in bed supine 6L afebrile   Barely wakes up   Wbc20.2 cr1.5    Urine cx E faecalis   Blood cx ngtd   S/p PROCEDURE:  Diagnostic laparoscopy with lysis of adhesions. No signs of ischemia. Intra-abdominal  adhesions. Minimal intra-abdominal fluid. No other signs of  intra-abdominal pathology.     REVIEW OF SYSTEMS     CONSTITUTIONAL:   as in hpi     CURRENT MEDICATIONS     Current Facility-Administered Medications:     0.2% sodium chloride infusion, 100 mL/hr, IntraVENous, Continuous, Mirela Funez, APRN - CNP, Last Rate: 100 mL/hr at 01/22/22 1156, 100 mL/hr at 01/22/22 1156    thiamine (B-1) injection 100 mg, 100 mg, IntraVENous, Daily, Teresa Silveira MD, 100 mg at 01/22/22 4364    acyclovir (ZOVIRAX) 500 mg in dextrose 5 % 100 mL IVPB, 5 mg/kg, IntraVENous, Q8H, Hope Charles MD, Stopped at 01/22/22 0592    [Held by provider] lactated ringers infusion, , IntraVENous, Continuous, Dylan Logan MD, Stopped at 01/21/22 0830    lidocaine PF 1 % injection 5 mL, 5 mL, IntraDERmal, Once, Keyon Escobedo MD    sodium chloride flush 0.9 % injection 5-40 mL, 5-40 mL, IntraVENous, 2 times per day, Keyon Escobedo MD, 10 mL at 01/21/22 3415    sodium chloride flush 0.9 % injection 5-40 mL, 5-40 mL, IntraVENous, PRN, Alec Ramirez MD    0.9 % sodium chloride infusion, 25 mL, IntraVENous, PRN, Keyon Escobedo MD    heparin flush 100 UNIT/ML injection 300 Units, 3 mL, IntraVENous, 2 times per day, Keyon Escobedo MD, 300 Units at 01/21/22 0851    heparin flush 100 UNIT/ML injection 300 Units, 3 mL, IntraCATHeter, PRN, MD Jonny Galvez perflutren lipid microspheres (DEFINITY) injection 1.65 mg, 1.5 mL, IntraVENous, ONCE PRN, Rafa Renteria MD    piperacillin-tazobactam (ZOSYN) 3,375 mg in dextrose 5 % 50 mL IVPB extended infusion (mini-bag), 3,375 mg, IntraVENous, Q8H, Wayne Monk MD, Last Rate: 12.5 mL/hr at 01/22/22 0923, 3,375 mg at 01/22/22 0923    ipratropium-albuterol (DUONEB) nebulizer solution 3 mL, 3 mL, Inhalation, 4x daily, Tracie Brar MD, 3 mL at 01/22/22 0943    pantoprazole (PROTONIX) injection 40 mg, 40 mg, IntraVENous, BID, 40 mg at 01/22/22 0923 **AND** sodium chloride (PF) 0.9 % injection 10 mL, 10 mL, IntraVENous, BID, Claudia Vogt MD, 10 mL at 01/22/22 2650    fluconazole (DIFLUCAN) in 0.9 % sodium chloride IVPB 400 mg, 400 mg, IntraVENous, Q24H, Wayne Monk MD, Last Rate: 100 mL/hr at 01/21/22 1510, 400 mg at 01/21/22 1510    metoprolol tartrate (LOPRESSOR) tablet 50 mg, 50 mg, Oral, BID, Susannah Ramirez MD, 50 mg at 01/22/22 0317    docusate (COLACE) 50 MG/5ML liquid 100 mg, 100 mg, Oral, BID, Tracie Brar MD, 100 mg at 01/22/22 9643    heparin (porcine) injection 5,000 Units, 5,000 Units, SubCUTAneous, BID, Aldo Huddleston MD, 5,000 Units at 01/22/22 0947    ondansetron (ZOFRAN-ODT) disintegrating tablet 4 mg, 4 mg, Oral, Q8H PRN, 4 mg at 01/11/22 1806 **OR** ondansetron (ZOFRAN) injection 4 mg, 4 mg, IntraVENous, Q6H PRN, Aldo Huddleston MD    polyethylene glycol [facility]) packet 17 g, 17 g, Oral, Daily PRN, Aldo Huddleston MD, 17 g at 01/13/22 1611    acetaminophen (TYLENOL) tablet 650 mg, 650 mg, Oral, Q6H PRN, 650 mg at 01/22/22 0948 **OR** acetaminophen (TYLENOL) suppository 650 mg, 650 mg, Rectal, Q6H PRN, Aldo Huddleston MD    predniSONE (DELTASONE) tablet 5 mg, 5 mg, Oral, Daily, Aldo Huddleston MD, 5 mg at 01/22/22 7031    sodium chloride flush 0.9 % injection 5-40 mL, 5-40 mL, IntraVENous, PRN, Aldo Huddleston MD, 10 mL at 01/17/22 1601    0.9 % sodium chloride infusion, 25 mL, IntraVENous, PRN, Darrell Gaona MD, Last Rate: 100 mL/hr at 01/16/22 0035, 25 mL at 01/16/22 0035    heparin flush 100 UNIT/ML injection 100 Units, 1 mL, IntraVENous, 2 times per day, Darrell Gaona MD, 100 Units at 01/21/22 2154    heparin flush 100 UNIT/ML injection 100 Units, 1 mL, IntraCATHeter, PRN, Darrell Gaona MD    allopurinol (ZYLOPRIM) tablet 100 mg, 100 mg, Oral, Daily, Darrell Gaona MD, 100 mg at 01/22/22 5414    budesonide (PULMICORT) nebulizer suspension 500 mcg, 500 mcg, Nebulization, BID, Darrell Gaona MD, 500 mcg at 02/82/52 2525    folic acid (FOLVITE) tablet 1 mg, 1 mg, Oral, Daily, Darrell Gaona MD, 1 mg at 01/22/22 8322    levothyroxine (SYNTHROID) tablet 50 mcg, 50 mcg, Oral, Daily, Darrell Gaona MD, 50 mcg at 01/22/22 4249    atorvastatin (LIPITOR) tablet 10 mg, 10 mg, Oral, Daily, Darrell Gaona MD, 10 mg at 01/22/22 4312    ondansetron (ZOFRAN-ODT) disintegrating tablet 4 mg, 4 mg, Oral, Once, Darrell Gaona MD  ALLERGIES     Macrodantin [nitrofurantoin macrocrystal] and Sulfa antibiotics  Immunization History   Administered Date(s) Administered    COVID-19, Pfizer Purple top, DILUTE for use, 12+ yrs, 30mcg/0.3mL dose 01/28/2021, 02/18/2021, 08/18/2021      Internal Administration   First Dose COVID-19, Pfizer Purple top, DILUTE for use, 12+ yrs, 30mcg/0.3mL dose  01/28/2021   Second Dose COVID-19, Pfizer Purple top, DILUTE for use, 12+ yrs, 30mcg/0.3mL dose   02/18/2021       Last COVID Lab SARS-CoV-2 (no units)   Date Value   01/20/2021 Not Detected     SARS-CoV-2 Antibody, Total (no units)   Date Value   01/13/2022 Non-Reactive     SARS-CoV-2, PCR (no units)   Date Value   01/11/2022 Not Detected     SARS-CoV-2, NAAT (no units)   Date Value   01/10/2022 Not Detected            PAST MEDICAL HISTORY     Past Medical History:   Diagnosis Date    Abnormal EKG     left bundle branch block    Acute gout involving toe of right foot 9/3/2020  Acute gout involving toe of right foot 9/3/2020    Acute on chronic combined systolic (congestive) and diastolic (congestive) heart failure (HCC)     Cancer (HCC)     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    Cerebrovascular disease     Clotting disorder ([address] 1985    thrombophlebitis after c section delivery    Depression     15 years followed by dr Kyung Castillo Hyperlipidemia     Hypertension     Hyperthyroidism     Leg swelling 9/3/2020    Lymphedema of both lower extremities 9/3/2020    Peripheral vascular disease ([address]     Renal failure 2012    renal transplant    Thrombophlebitis     after c section delivery    Thyroid disease      SURGICAL HISTORY       Past Surgical History:   Procedure Laterality Date     SECTION  1985    one normal delivery    COLECTOMY N/A 1/15/2022    DIAGNOSTIC  LAPAROSCOPY LYSIS OF ADHESIONS performed by Josemanuel Morton MD at [address] [facility] [address] CATH LAB PROCEDURE      normal coronaries and  normal coronaries    DIALYSIS FISTULA CREATION  march and 2012    phase one and two patient will start dialysis when needed   [address]    transfemoral venous bypass grafts    KIDNEY TRANSPLANT  2016    KIDNEY TRANSPLANT  2015    KIDNEY TRANSPLANT  2015    PARATHYROIDECTOMY  2006    left parathyroid  implant and parathyroidectomy     PHYSICAL EXAM       Vitals:    22 0413 22 0624 22 0942 22 0946   BP: 115/65  (!) 106/59    Pulse: 108  117    Resp:    Temp: 99.7 °F (37.6 °C)  100.3 °F (37.9 °C)    TempSrc: Axillary  Axillary    SpO2: 96%  94%    Weight:       Height:         Physical Exam     Constitutional/General:   IN BED on her side  Head: NC/AT  Pulmonary: Lungs  DEc   to auscultation bilaterally ant .   on  BIPAP  Cardiovascular:  Regular rate and rhythm   Abdomen: Soft, dec  BS. distension.  NT   NGT  Extremities:     OVERALL Edema  Skin: Warm and dry Neurologic:    lethargic moans  RIGHT MIDLINE seeping   MERCHANT      DIAGNOSTIC RESULTS   RADIOLOGY:   CT ABDOMEN PELVIS WO CONTRAST Additional Contrast? None    Result Date: 1/10/2022  EXAMINATION: CT OF THE ABDOMEN AND PELVIS WITHOUT CONTRAST 1/10/2022 6:56 pm TECHNIQUE: CT of the abdomen and pelvis was performed without the administration of intravenous contrast. Multiplanar reformatted images are provided for review. Dose modulation, iterative reconstruction, and/or weight based adjustment of the mA/kV was utilized to reduce the radiation dose to as low as reasonably achievable. COMPARISON: CT abdomen pelvis 11/17/2021 HISTORY: ORDERING SYSTEM PROVIDED HISTORY: evaluate for diarrhea TECHNOLOGIST PROVIDED HISTORY: Reason for exam:->evaluate for diarrhea Additional Contrast?->None Decision Support Exception - unselect if not a suspected or confirmed emergency medical condition->Emergency Medical Condition (MA) FINDINGS: Lower Chest: Atherosclerotic disease. Atrophic kidneys with multiple cysts. Some of the presumed cysts appear hyperdense. Subcentimeter foci in the kidneys are too small to accurately characterize. There is a transplant kidney in the right lower quadrant. There is no hydronephrosis of the transplant kidney. Decreased perinephric stranding above the transplant kidney. Unremarkable bladder. Unremarkable appearance of the reproductive organs. No abdominal aortic aneurysm. Atherosclerotic disease. No evidence of cholecystitis or pancreatitis. No free air or significant free fluid. No evidence of bowel obstruction. Nondilated appendix. No significant fluid within the large bowel. Small fat containing umbilical hernia. Mild degenerative changes of the spine. Presumed surgical scars in the anterior abdominal wall. No significant fluid within the large bowel. No hydronephrosis of the transplant kidneys.   The native kidneys are atrophic and have multiple intermediate density cysts which should be further evaluated with nonemergent ultrasound. Atherosclerotic disease. XR CHEST PORTABLE    Result Date: 1/11/2022  EXAMINATION: ONE XRAY VIEW OF THE CHEST 1/11/2022 9:11 am COMPARISON: None. HISTORY: ORDERING SYSTEM PROVIDED HISTORY: shortness of breath TECHNOLOGIST PROVIDED HISTORY: Reason for exam:->shortness of breath FINDINGS: The heart is enlarged. There are no findings of failure. The lungs are clear. There is no focal infiltrate or effusion. 1. Stable cardiomegaly. There is no evidence of failure or pneumonia. LABS  Recent Labs     01/19/22  1740 01/20/22  0634   WBC  --  18.5*   HGB  --  10.5*   HCT 37.3 36.1   MCV  --  106.2*   PLT  --  113*     Recent Labs     01/20/22  0634 01/20/22  0634 01/21/22  0540 01/21/22  0540 01/22/22  0600   *  --  149*  --  153*   K 4.2   < > 4.2   < > 4.2   *   < > 112*   < > 114*   CO2 26   < > 29   < > 30*   BUN 61*  --  55*  --  55*   CREATININE 1.6*  --  1.6*  --  1.8*   GFRAA 39  --  39  --  34   LABGLOM 32  --  32  --  28   GLUCOSE 112*  --  153*  --  144*   PROT 4.6*  --  5.3*  --  5.1*   LABALBU 2.2*  --  2.7*  --  2.3*   CALCIUM 9.9  --  10.1  --  10.0   BILITOT 0.4  --  0.4  --  0.4   ALKPHOS 68  --  67  --  64   AST 10  --  18  --  39*   ALT 6  --  9  --  15    < > = values in this interval not displayed. No results for input(s): PROCAL in the last 72 hours.   Lab Results   Component Value Date    CRP 34.6 (H) 11/17/2021     Lab Results   Component Value Date    SEDRATE 62 (H) 11/20/2021     No results found for: [id number]  Lab Results   Component Value Date    COVID19 Non-Reactive 01/13/2022    COVID19 Not Detected 01/11/2022     COVID-19/MARINA-COV2 LABS  Recent Labs     01/20/22  0634 01/21/22  0540 01/22/22  0600   AST 10 18 39*   ALT 6 9 15           MICROBIOLOGY:  Lab Results   Component Value Date    BC 5 Days no growth 01/11/2022    BC 5 Days no growth 11/17/2021    BC  12/17/2019     Refer to previous culture of CXBLD from 12-17-19 @1120 for  susceptibility results      ORG Enterococcus faecalis 01/12/2022    ORG Klebsiella pneumoniae ssp pneumoniae 11/17/2021    ORG Staphylococcus coagulase-negative 11/17/2021    ORG Klebsiella pneumoniae ssp pneumoniae 11/17/2021     Lab Results   Component Value Date    BLOODCULT2 5 Days no growth 01/12/2022    BLOODCULT2  11/17/2021     This organism was isolated in one set. Susceptibility testing is not routinely done as this  organism frequently represents skin contamination. Additional testing can be ordered by calling the  Microbiology Department. ORG Enterococcus faecalis 01/12/2022    ORG Klebsiella pneumoniae ssp pneumoniae 11/17/2021    ORG Staphylococcus coagulase-negative 11/17/2021    ORG Klebsiella pneumoniae ssp pneumoniae 11/17/2021        Urine Culture, Routine   Date Value Ref Range Status   01/12/2022 >100,000 CFU/ml  Final   11/17/2021 <10,000 CFU/mL  Mixed gram positive organisms   (A)  Final   11/17/2021 >100,000 CFU/ml  Final     MRSA Culture Only   Date Value Ref Range Status   11/17/2021 Methicillin resistant Staph aureus not isolated  Final          FINAL IMPRESSION    Patient is a 73 y.o. female who presented with   Chief Complaint   Patient presents with    Fatigue     patient attempted to go to pcp today and didnt have the strength. came here instead. patient states sepsis prior to thanksgiving requiring admission and rehab.    and admitted for CECILY (acute kidney injury) ([facility] [N17.9]   IMMUNOCOMPROMISED PT RENAL TRANSPLANT  LEUKOCYTOSIS  pneumatosis involving the ascending to transverse colon. S/p BOWEL RESECTION LAPAROSCOPIC  Lysis of adhesions/colitis  VSEnterococcus faecalis   uti    ?  HCAP   CECILY    FUNGITELL + repeat  In am  hsv serology elevated igG,  IgM neg     S/P RX KLEBSIELLA/CONS BACTEREMIA  S/P RX E COLI UTI     FOLLOW LABS WATCH CR   Check blood cx 1/21  On acyclovir for persistent MS changes   HSV titers trending down        acyclovir (ZOVIRAX) 500 mg in dextrose 5 % 100 mL IVPB, Q8H  piperacillin-tazobactam (ZOSYN) 3,375 mg in dextrose 5 % 50 mL IVPB extended infusion (mini-bag), Q8H  fluconazole (DIFLUCAN) in 0.9 % sodium chloride IVPB 400 mg, Q24H       Fevers cover for VRE     Imaging and labs were reviewed per medical records       Thank you for involving me in the care of Mayte Martinez. Please do not hesitate to call for any questions or concerns.          Electronically signed by Efra Lane MD on 1/22/2022 at 1:12 PM      Beryl Phelps MD    4:04 PM

## 2022-01-22 NOTE — PROGRESS NOTES
Unresponsive  Objective:      Neuro exam 114/64 p108 t 99.7  General: comatose. Grimaces to tactile/noxious stim  Cranial nerve testing  unable to cooperate. PERRL, corneal reflexes +  . Funduscopic eye exam revealed not testable. Motor exam: . Elier Punt Deep tendon reflexes were absent bilaterally. Plantar responses were flexor bilaterally. Cerebellar exam noted . Elier Punt Sensation was . .        Assessment:   Altered mental status/coma of uncertain etiology but definitely associated with sepsis/infection with unrevealing MRI brain 1/16/22  General surgery lower GI eval negative  Macrocytic anemia with supranormal B12 level  CECILY with hypernatremia        Plan:   Empiric thiamine  Stop pristiq--can be resumed once medically stabilized/return of awareness/cognition  EEG  Ongoing ID management of antibiotics--consider LP with IR (platelet count 763T today)  Ongoing intensivist/pulmonary management  Consider palliative care/code status issues

## 2022-01-22 NOTE — PROGRESS NOTES
Progress Note  Date:2022       Room:0515/0515-  Patient Montell Closs     YOB: 1955     Age:66 y.o. Subjective    Subjective:  Symptoms:  Stable. (Pt very lethargic  , unresponsive , ). Diet:  Dietary issues: unable to eat due to being unresponsive, on TF. Activity level: Impaired due to weakness. Pain:  She reports no pain. Review of Systems  Objective         Vitals Last 24 Hours:  TEMPERATURE:  Temp  Av.6 °F (37.6 °C)  Min: 98.6 °F (37 °C)  Max: 100.3 °F (37.9 °C)  RESPIRATIONS RANGE: Resp  Av.6  Min: 20  Max: 24  PULSE OXIMETRY RANGE: SpO2  Av %  Min: 94 %  Max: 98 %  PULSE RANGE: Pulse  Av  Min: 105  Max: 117  BLOOD PRESSURE RANGE: Systolic (60LWF), BPY:115 , Min:106 , YNM:119   ; Diastolic (82ZTF), RNK:56, Min:59, Max:65    I/O (24Hr): Intake/Output Summary (Last 24 hours) at 2022 1327  Last data filed at 2022 0942  Gross per 24 hour   Intake 601 ml   Output 2075 ml   Net -1474 ml     Objective:  General Appearance: In no acute distress (Unresponsive,on bipap , looks ill ). Vital signs: (most recent): Blood pressure (!) 106/59, pulse 117, temperature 100.3 °F (37.9 °C), temperature source Axillary, resp. rate 24, height 5' 2\" (1.575 m), weight 216 lb (98 kg), SpO2 94 %. Vital signs are normal.    Output: Producing urine. HEENT: Normal HEENT exam.    Lungs: There are decreased breath sounds. (Few Coarse ronchi bilateral )  Heart: Normal rate. Regular rhythm. Positive for murmur. Abdomen: Abdomen is soft and distended. (Obese ). Hypoactive bowel sounds. (Incisions from laparoscopy , glued and clean ). Extremities: (Edema +1   bilateral both LL )  Neurological: (Unresponsive ). Pupils:  Pupils are equal, round, and reactive to light.       Labs/Imaging/Diagnostics    Labs:  CBC:  Recent Labs     22  1740 22  0634   WBC  --  18.5*   RBC  --  3.40*   HGB  --  10.5*   HCT 37.3 36.1   MCV  -- evidence of cholecystitis or pancreatitis. No free air or significant free fluid. No evidence of bowel obstruction. Nondilated appendix. No significant fluid within the large bowel. Small fat containing umbilical hernia. Mild degenerative changes of the spine. Presumed surgical scars in the anterior abdominal wall. No significant fluid within the large bowel. No hydronephrosis of the transplant kidneys. The native kidneys are atrophic and have multiple intermediate density cysts which should be further evaluated with nonemergent ultrasound. Atherosclerotic disease. XR CHEST PORTABLE    Result Date: 1/11/2022  EXAMINATION: ONE XRAY VIEW OF THE CHEST 1/11/2022 9:11 am COMPARISON: None. HISTORY: ORDERING SYSTEM PROVIDED HISTORY: shortness of breath TECHNOLOGIST PROVIDED HISTORY: Reason for exam:->shortness of breath FINDINGS: The heart is enlarged. There are no findings of failure. The lungs are clear. There is no focal infiltrate or effusion. 1. Stable cardiomegaly. There is no evidence of failure or pneumonia. Assessment//Plan           Hospital Problems           Last Modified POA    * (Principal) Coma ([facility] [address] 1/21/2022 Yes    CECILY (acute kidney injury) ([address] 1/11/2022 Yes    Ischemic bowel disease ([address] 1/15/2022 Yes    Altered mental status 1/21/2022 Yes        Assessment:    Condition: In serious condition. Unchanged. (Sepsis immunocompromised host ,    Cefepime d/c  and  Was  on zosyn , zyvox , and flagyl , ct scan abdomen , inflammation in the descending and sigmoid colon s/p laparoscopy by dr Orbie Gilford , no sign of ischemic bowel, now on daptomycin, Zosyn and Diflucan  Urine enterococcus fecalis on Zosyn and daptomycin,   Blood culture negative  ,  resp film array negative   Bilateral LL pneumonia  On zosyn , but still unresponsive, ABG metabolic and resp acidosis resolved, continue bipap ,   Ct scan of the abdomen ?  Enteritis , colonic distention no ischemic bowel ,pneumatosis , was taken by dr Ian Funez 1/15  possible ischemic bowel  Laparoscopy , but was clear .,   Ileus due to sepsis     Hypernatremia increase  h20 flush per ng 400 cc q6 hours , add IV fluids D5 half-normal at 50 an hour and repeat BMP at 3 PM     ? Sepsis  Bilateral  pneumonia also on bipap , on zosyn , just had a COVID 19 positive today, was negative prior that , this admission, will place her on Decadron, await input about further treatment from ID    Leukocytosis secondary to above ,      Acute on chronic renal failure , renal function slightly worse, creatinine 1.8, 1.6 yesterday upon admission was 2.5 , better ,     S/p renal transplant in the past ,     HTN bp optimal  On metorpolol    Copd on aerosol treatment as needed     Bipolar controlled on  pristiq , hold clozaril    Hypothyroidism continue synthroid,     ? Viral meningitis , discussed with dr Sania Veliz ? Herpes simplex  On acyclovir, now ,    2 d echo done today good EF 60-65 %      await transfer to [facility]     reached out again for the transfer and still no beds     Poor prognosis       ).        Electronically signed by Junior Noel MD on 1/12/22 at 6:29 PM EST

## 2022-01-22 NOTE — PROGRESS NOTES
Pulmonary/Critical Care Progress Note    We are following patient for acute respiratory failure with hypoxia and hypercapnia, hypoventilation, acute mental status change, pneumatosis of the colon, colitis, UTI, history of kidney transplant, home immunosuppression agent, acute on chronic renal failure, hypothyroidism, history of bipolar disorder, COPD. In Summary:  Kwame Sherwood is a 73 y.o. female with hx of Kidney TX, , CKD, Bipolar, morbid obesity who presents with pneumatosis of colon, altered mental status         ISSUES:        Acute Hypercapneic Respiratory Failure   COPD  Morbid Obesity  ? Baseline obesity hypoventialtion  On BiPAP  On IPAP  14     ABG with excellent compensation / ventilation and normal pH    Recent Labs     01/21/22  1723   PH 7.409   PCO2 48.9   O2SAT 91.3*          AMS  Unclear cause  No real change x days  Work up ongoing  Normal ABG excludes pulmonary cause for altered mental status     Pneumatosis of colon, no evidence of bowel obstruction  Colitis  Bowel adhesion  Continue IV fluid. NG tube to low wall intermittent suction. Being followed by GI / Surgery      UTI  History of kidney transplant, on immunosuppressant agents  Acute on chronic renal failure  cefepime/Zyvox/Diflucan/Flagyl per ID. Hypertension  Bipolar disorder  Hypothyroidism. History of     Will follow results    ____________________________________      SUBJECTIVE:  Patient seen and examined in room. She is still very somnolent barely arousable.     MEDICATIONS:   thiamine  100 mg IntraVENous Daily    acyclovir  5 mg/kg IntraVENous Q8H    lidocaine PF  5 mL IntraDERmal Once    sodium chloride flush  5-40 mL IntraVENous 2 times per day    heparin flush  3 mL IntraVENous 2 times per day    piperacillin-tazobactam  3,375 mg IntraVENous Q8H    ipratropium-albuterol  3 mL Inhalation 4x daily    pantoprazole  40 mg IntraVENous BID    And    sodium chloride (PF)  10 mL IntraVENous BID    fluconazole 400 mg IntraVENous Q24H    metoprolol tartrate  50 mg Oral BID    docusate  100 mg Oral BID    heparin (porcine)  5,000 Units SubCUTAneous BID    predniSONE  5 mg Oral Daily    heparin flush  1 mL IntraVENous 2 times per day    allopurinol  100 mg Oral Daily    budesonide  500 mcg Nebulization BID    folic acid  1 mg Oral Daily    levothyroxine  50 mcg Oral Daily    atorvastatin  10 mg Oral Daily    ondansetron  4 mg Oral Once      dextrose 5 % and 0.45 % NaCl      lactated ringers Stopped (01/21/22 0830)    sodium chloride      sodium chloride 25 mL (01/16/22 0035)     sodium chloride flush, sodium chloride, heparin flush, perflutren lipid microspheres, ondansetron **OR** ondansetron, polyethylene glycol, acetaminophen **OR** acetaminophen, sodium chloride flush, sodium chloride, heparin flush      REVIEW OF SYSTEMS:  Unable to obtain due to patient mental status. OBJECTIVE:  Vitals:    01/22/22 0624   BP:    Pulse:    Resp: 22   Temp:    SpO2:      FiO2 : 35 %  O2 Flow Rate (L/min): 5.5 L/min  O2 Device: PAP (positive airway pressure)    PHYSICAL EXAM:  Cardiovascular: Normal S1 and S2, no murmur. Respiratory: Diminished breathing sound bilaterally. Gastrointestinal: Distended, hypoactive bowel sounds.   Genitourinary: Unremarkable  Extremities: 1+ pitting edema on both lower extremity  Neurological: +++ lethargic    LABS:  WBC   Date Value Ref Range Status   01/20/2022 18.5 (H) 4.5 - 11.5 E9/L Final   01/19/2022 21.4 (H) 4.5 - 11.5 E9/L Final   01/19/2022 20.5 (H) 4.5 - 11.5 E9/L Final     Hemoglobin   Date Value Ref Range Status   01/20/2022 10.5 (L) 11.5 - 15.5 g/dL Final   01/19/2022 11.6 11.5 - 15.5 g/dL Final   01/19/2022 11.2 (L) 11.5 - 15.5 g/dL Final     Hematocrit   Date Value Ref Range Status   01/20/2022 36.1 34.0 - 48.0 % Final   01/19/2022 37.3 34.0 - 48.0 % Final   01/19/2022 39.6 34.0 - 48.0 % Final     MCV   Date Value Ref Range Status   01/20/2022 106.2 (H) 80.0 - 99.9 fL Final 01/19/2022 104.8 (H) 80.0 - 99.9 fL Final   01/19/2022 106.5 (H) 80.0 - 99.9 fL Final     Platelets   Date Value Ref Range Status   01/20/2022 113 (L) 130 - 450 E9/L Final   01/19/2022 96 (L) 130 - 450 E9/L Final   01/19/2022 154 130 - 450 E9/L Final     Sodium   Date Value Ref Range Status   01/22/2022 153 (H) 132 - 146 mmol/L Final   01/21/2022 149 (H) 132 - 146 mmol/L Final   01/20/2022 147 (H) 132 - 146 mmol/L Final     Potassium   Date Value Ref Range Status   01/22/2022 4.2 3.5 - 5.0 mmol/L Final   01/21/2022 4.2 3.5 - 5.0 mmol/L Final   01/20/2022 4.2 3.5 - 5.0 mmol/L Final     Potassium reflex Magnesium   Date Value Ref Range Status   01/10/2022 5.5 (H) 3.5 - 5.0 mmol/L Final     Comment:     Specimen is moderately Hemolyzed. Result may be artificially increased. 12/17/2019 5.2 (H) 3.5 - 5.0 mmol/L Final     Comment:     Specimen is moderately Hemolyzed. Result may be artificially increased.      Chloride   Date Value Ref Range Status   01/22/2022 114 (H) 98 - 107 mmol/L Final   01/21/2022 112 (H) 98 - 107 mmol/L Final   01/20/2022 113 (H) 98 - 107 mmol/L Final     CO2   Date Value Ref Range Status   01/22/2022 30 (H) 22 - 29 mmol/L Final   01/21/2022 29 22 - 29 mmol/L Final   01/20/2022 26 22 - 29 mmol/L Final     BUN   Date Value Ref Range Status   01/22/2022 55 (H) 6 - 23 mg/dL Final   01/21/2022 55 (H) 6 - 23 mg/dL Final   01/20/2022 61 (H) 6 - 23 mg/dL Final     CREATININE   Date Value Ref Range Status   01/22/2022 1.8 (H) 0.5 - 1.0 mg/dL Final   01/21/2022 1.6 (H) 0.5 - 1.0 mg/dL Final   01/20/2022 1.6 (H) 0.5 - 1.0 mg/dL Final     Glucose   Date Value Ref Range Status   01/22/2022 144 (H) 74 - 99 mg/dL Final   01/21/2022 153 (H) 74 - 99 mg/dL Final   01/20/2022 112 (H) 74 - 99 mg/dL Final     Calcium   Date Value Ref Range Status   01/22/2022 10.0 8.6 - 10.2 mg/dL Final   01/21/2022 10.1 8.6 - 10.2 mg/dL Final   01/20/2022 9.9 8.6 - 10.2 mg/dL Final     Total Protein   Date Value Ref Range Status 01/22/2022 5.1 (L) 6.4 - 8.3 g/dL Final   01/21/2022 5.3 (L) 6.4 - 8.3 g/dL Final   01/20/2022 4.6 (L) 6.4 - 8.3 g/dL Final     Albumin   Date Value Ref Range Status   01/22/2022 2.3 (L) 3.5 - 5.2 g/dL Final   01/21/2022 2.7 (L) 3.5 - 5.2 g/dL Final   01/20/2022 2.2 (L) 3.5 - 5.2 g/dL Final     Total Bilirubin   Date Value Ref Range Status   01/22/2022 0.4 0.0 - 1.2 mg/dL Final   01/21/2022 0.4 0.0 - 1.2 mg/dL Final   01/20/2022 0.4 0.0 - 1.2 mg/dL Final     Alkaline Phosphatase   Date Value Ref Range Status   01/22/2022 64 35 - 104 U/L Final   01/21/2022 67 35 - 104 U/L Final   01/20/2022 68 35 - 104 U/L Final     AST   Date Value Ref Range Status   01/22/2022 39 (H) 0 - 31 U/L Final   01/21/2022 18 0 - 31 U/L Final   01/20/2022 10 0 - 31 U/L Final     ALT   Date Value Ref Range Status   01/22/2022 15 0 - 32 U/L Final   01/21/2022 9 0 - 32 U/L Final   01/20/2022 6 0 - 32 U/L Final     GFR Non-   Date Value Ref Range Status   01/22/2022 28 >=60 mL/min/1.73 Final     Comment:     Chronic Kidney Disease: less than 60 ml/min/1.73 sq.m. Kidney Failure: less than 15 ml/min/1.73 sq.m. Results valid for patients 18 years and older. 01/21/2022 32 >=60 mL/min/1.73 Final     Comment:     Chronic Kidney Disease: less than 60 ml/min/1.73 sq.m. Kidney Failure: less than 15 ml/min/1.73 sq.m. Results valid for patients 18 years and older. 01/20/2022 32 >=60 mL/min/1.73 Final     Comment:     Chronic Kidney Disease: less than 60 ml/min/1.73 sq.m. Kidney Failure: less than 15 ml/min/1.73 sq.m. Results valid for patients 18 years and older.        GFR    Date Value Ref Range Status   01/22/2022 34  Final   01/21/2022 39  Final   01/20/2022 39  Final     Magnesium   Date Value Ref Range Status   01/22/2022 2.1 1.6 - 2.6 mg/dL Final   01/21/2022 2.2 1.6 - 2.6 mg/dL Final   01/20/2022 2.1 1.6 - 2.6 mg/dL Final     Phosphorus   Date Value Ref Range Status   01/22/2022 2.7 2.5 - 4.5 mg/dL Final   01/21/2022 2.0 (L) 2.5 - 4.5 mg/dL Final   01/20/2022 2.2 (L) 2.5 - 4.5 mg/dL Final     Recent Labs     01/21/22  1723   PH 7.409   BE 5.2*   O2SAT 91.3*       RADIOLOGY:  CT ABDOMEN PELVIS WO CONTRAST Additional Contrast? None   Final Result   1. Colitis involving the left colon from splenic flexure to mid to distal   sigmoid colon. 2. Atrophy of native kidneys. Multiple renal cysts and nonobstructing left   renal calculus. Transplant kidney in right iliac fossa. 3. Trace ascites superficial to the liver. 4. Unchanged consolidation in lower lobes. 5. Unchanged cardiomegaly. 6. Possible gallbladder sludge and or cholelithiasis. RECOMMENDATIONS:   Unavailable         XR ABDOMEN (KUB) (SINGLE AP VIEW)   Final Result   No evidence of bowel obstruction. CT CHEST WO CONTRAST   Final Result   1. Bilateral lower lobe alveolar opacities compatible with pneumonia. Increased consolidation in the right lower lobe compared to the prior study. 2.  Trace effusions and trace pericardial effusion with left ventricular   dilatation. 3.  No evidence to suggest empyema. 4.  Gastric catheter passes into the stomach. XR CHEST PORTABLE   Final Result   Infiltrate and or atelectasis left greater than right with mildly improving   aeration in the left mid lung. MRI BRAIN WO CONTRAST   Final Result   No acute intracranial abnormality. XR ABDOMEN FOR NG/OG/NE TUBE PLACEMENT   Final Result   NG tube in good position. XR CHEST PORTABLE   Final Result   Cardiomegaly. Findings for an infiltrate with some consolidation in the mid lower aspect of   the left lung. CT ABDOMEN PELVIS WO CONTRAST Additional Contrast? Oral   Final Result   There is pneumatosis involving the ascending to transverse colon. There is   wall thickening of the splenic flexure to descending colon.   This is   nonspecific but can be seen related to ischemic enteritis but there is no   evidence for portal venous gas,, medication induced pneumatosis in a patient   taking steroids or chemotherapy, procedure related. Clinical correlation   recommended. Surgical consultation may be needed. There is fairly dense bibasilar infiltrates which have progressed from prior   exam.  Correlation with fever and white count and follow-up to resolution   recommended. Multiple mixed density cysts both kidneys related to chronic renal failure   with the transplant right lower quadrant. There is distention of small bowel in the lower pelvis without obstructing   lesion identified. Small hiatal hernia. XR ABDOMEN (KUB) (SINGLE AP VIEW)   Final Result   Abundant gas and stool throughout the colon may represent constipation. XR CHEST PORTABLE   Final Result   1. Stable cardiomegaly. There is no evidence of failure or pneumonia. CT ABDOMEN PELVIS WO CONTRAST Additional Contrast? None   Final Result   No significant fluid within the large bowel. No hydronephrosis of the transplant kidneys. The native kidneys are atrophic   and have multiple intermediate density cysts which should be further   evaluated with nonemergent ultrasound. Atherosclerotic disease.          IR MIDLINE CATH    (Results Pending)           Electronically signed by Ginette Oshea MD on 1/22/2022 at 8:30 AM

## 2022-01-23 ENCOUNTER — APPOINTMENT (OUTPATIENT)
Dept: GENERAL RADIOLOGY | Age: 67
DRG: 673 | End: 2022-01-23
Payer: MEDICARE

## 2022-01-23 LAB
ALBUMIN SERPL-MCNC: 2.1 G/DL (ref 3.5–5.2)
ALP BLD-CCNC: 104 U/L (ref 35–104)
ALT SERPL-CCNC: 87 U/L (ref 0–32)
ANION GAP SERPL CALCULATED.3IONS-SCNC: 11 MMOL/L (ref 7–16)
AST SERPL-CCNC: 171 U/L (ref 0–31)
BACTERIA: ABNORMAL /HPF
BILIRUB SERPL-MCNC: 0.5 MG/DL (ref 0–1.2)
BILIRUBIN URINE: NEGATIVE
BLOOD, URINE: ABNORMAL
BUN BLDV-MCNC: 52 MG/DL (ref 6–23)
C-REACTIVE PROTEIN: 18.3 MG/DL (ref 0–0.4)
CALCIUM SERPL-MCNC: 9.1 MG/DL (ref 8.6–10.2)
CHLORIDE BLD-SCNC: 108 MMOL/L (ref 98–107)
CLARITY: CLEAR
CO2: 24 MMOL/L (ref 22–29)
COLOR: YELLOW
CREAT SERPL-MCNC: 1.8 MG/DL (ref 0.5–1)
D DIMER: 2908 NG/ML DDU
EPITHELIAL CELLS, UA: ABNORMAL /HPF
FERRITIN: 4062 NG/ML
FIBRINOGEN: >700 MG/DL (ref 225–540)
GFR AFRICAN AMERICAN: 34
GFR NON-AFRICAN AMERICAN: 28 ML/MIN/1.73
GLUCOSE BLD-MCNC: 113 MG/DL (ref 74–99)
GLUCOSE URINE: NEGATIVE MG/DL
HCT VFR BLD CALC: 35.5 % (ref 34–48)
HEMOGLOBIN: 10.7 G/DL (ref 11.5–15.5)
INR BLD: 1.1
KETONES, URINE: NEGATIVE MG/DL
LACTATE DEHYDROGENASE: 448 U/L (ref 135–214)
LACTIC ACID: 0.9 MMOL/L (ref 0.5–2.2)
LEUKOCYTE ESTERASE, URINE: NEGATIVE
MAGNESIUM: 1.9 MG/DL (ref 1.6–2.6)
MCH RBC QN AUTO: 31.9 PG (ref 26–35)
MCHC RBC AUTO-ENTMCNC: 30.1 % (ref 32–34.5)
MCV RBC AUTO: 106 FL (ref 80–99.9)
NITRITE, URINE: NEGATIVE
PDW BLD-RTO: 15.9 FL (ref 11.5–15)
PH UA: 5.5 (ref 5–9)
PHOSPHORUS: 2.5 MG/DL (ref 2.5–4.5)
PLATELET # BLD: 82 E9/L (ref 130–450)
PLATELET CONFIRMATION: NORMAL
PMV BLD AUTO: 13.8 FL (ref 7–12)
POTASSIUM SERPL-SCNC: 4 MMOL/L (ref 3.5–5)
PROCALCITONIN: 0.86 NG/ML (ref 0–0.08)
PROTEIN UA: 100 MG/DL
PROTHROMBIN TIME: 13 SEC (ref 9.3–12.4)
RBC # BLD: 3.35 E12/L (ref 3.5–5.5)
RBC UA: ABNORMAL /HPF (ref 0–2)
SEDIMENTATION RATE, ERYTHROCYTE: 61 MM/HR (ref 0–20)
SODIUM BLD-SCNC: 143 MMOL/L (ref 132–146)
SPECIFIC GRAVITY UA: 1.02 (ref 1–1.03)
TOTAL CK: 105 U/L (ref 20–180)
TOTAL PROTEIN: 4.6 G/DL (ref 6.4–8.3)
TROPONIN, HIGH SENSITIVITY: 73 NG/L (ref 0–9)
UROBILINOGEN, URINE: 0.2 E.U./DL
WBC # BLD: 16.9 E9/L (ref 4.5–11.5)
WBC UA: ABNORMAL /HPF (ref 0–5)

## 2022-01-23 PROCEDURE — 87040 BLOOD CULTURE FOR BACTERIA: CPT

## 2022-01-23 PROCEDURE — 81001 URINALYSIS AUTO W/SCOPE: CPT

## 2022-01-23 PROCEDURE — 74018 RADEX ABDOMEN 1 VIEW: CPT

## 2022-01-23 PROCEDURE — 6360000002 HC RX W HCPCS: Performed by: INTERNAL MEDICINE

## 2022-01-23 PROCEDURE — 84145 PROCALCITONIN (PCT): CPT

## 2022-01-23 PROCEDURE — 6360000002 HC RX W HCPCS: Performed by: HOSPITALIST

## 2022-01-23 PROCEDURE — 36415 COLL VENOUS BLD VENIPUNCTURE: CPT

## 2022-01-23 PROCEDURE — 2700000000 HC OXYGEN THERAPY PER DAY

## 2022-01-23 PROCEDURE — 6370000000 HC RX 637 (ALT 250 FOR IP): Performed by: INTERNAL MEDICINE

## 2022-01-23 PROCEDURE — 6360000002 HC RX W HCPCS: Performed by: SPECIALIST

## 2022-01-23 PROCEDURE — 82550 ASSAY OF CK (CPK): CPT

## 2022-01-23 PROCEDURE — 85610 PROTHROMBIN TIME: CPT

## 2022-01-23 PROCEDURE — 86140 C-REACTIVE PROTEIN: CPT

## 2022-01-23 PROCEDURE — 6360000002 HC RX W HCPCS: Performed by: SURGERY

## 2022-01-23 PROCEDURE — C9113 INJ PANTOPRAZOLE SODIUM, VIA: HCPCS | Performed by: HOSPITALIST

## 2022-01-23 PROCEDURE — 2580000003 HC RX 258: Performed by: SPECIALIST

## 2022-01-23 PROCEDURE — 83615 LACTATE (LD) (LDH) ENZYME: CPT

## 2022-01-23 PROCEDURE — 2500000003 HC RX 250 WO HCPCS: Performed by: SPECIALIST

## 2022-01-23 PROCEDURE — 82728 ASSAY OF FERRITIN: CPT

## 2022-01-23 PROCEDURE — 2060000000 HC ICU INTERMEDIATE R&B

## 2022-01-23 PROCEDURE — 84100 ASSAY OF PHOSPHORUS: CPT

## 2022-01-23 PROCEDURE — 6370000000 HC RX 637 (ALT 250 FOR IP): Performed by: SURGERY

## 2022-01-23 PROCEDURE — 83735 ASSAY OF MAGNESIUM: CPT

## 2022-01-23 PROCEDURE — 83605 ASSAY OF LACTIC ACID: CPT

## 2022-01-23 PROCEDURE — A4216 STERILE WATER/SALINE, 10 ML: HCPCS | Performed by: SPECIALIST

## 2022-01-23 PROCEDURE — 6360000002 HC RX W HCPCS: Performed by: PSYCHIATRY & NEUROLOGY

## 2022-01-23 PROCEDURE — 87449 NOS EACH ORGANISM AG IA: CPT

## 2022-01-23 PROCEDURE — 87088 URINE BACTERIA CULTURE: CPT

## 2022-01-23 PROCEDURE — 94660 CPAP INITIATION&MGMT: CPT

## 2022-01-23 PROCEDURE — 2580000003 HC RX 258: Performed by: INTERNAL MEDICINE

## 2022-01-23 PROCEDURE — P9047 ALBUMIN (HUMAN), 25%, 50ML: HCPCS | Performed by: INTERNAL MEDICINE

## 2022-01-23 PROCEDURE — 2580000003 HC RX 258: Performed by: HOSPITALIST

## 2022-01-23 PROCEDURE — 80053 COMPREHEN METABOLIC PANEL: CPT

## 2022-01-23 PROCEDURE — 94640 AIRWAY INHALATION TREATMENT: CPT

## 2022-01-23 PROCEDURE — 84484 ASSAY OF TROPONIN QUANT: CPT

## 2022-01-23 PROCEDURE — 85651 RBC SED RATE NONAUTOMATED: CPT

## 2022-01-23 PROCEDURE — A4216 STERILE WATER/SALINE, 10 ML: HCPCS | Performed by: HOSPITALIST

## 2022-01-23 PROCEDURE — 85384 FIBRINOGEN ACTIVITY: CPT

## 2022-01-23 PROCEDURE — 85027 COMPLETE CBC AUTOMATED: CPT

## 2022-01-23 PROCEDURE — 85378 FIBRIN DEGRADE SEMIQUANT: CPT

## 2022-01-23 RX ORDER — ALBUMIN (HUMAN) 12.5 G/50ML
25 SOLUTION INTRAVENOUS ONCE
Status: COMPLETED | OUTPATIENT
Start: 2022-01-23 | End: 2022-01-23

## 2022-01-23 RX ADMIN — PANTOPRAZOLE SODIUM 40 MG: 40 INJECTION, POWDER, FOR SOLUTION INTRAVENOUS at 20:50

## 2022-01-23 RX ADMIN — ACETAMINOPHEN 650 MG: 650 SUPPOSITORY RECTAL at 05:25

## 2022-01-23 RX ADMIN — ALBUMIN (HUMAN) 25 G: 0.25 INJECTION, SOLUTION INTRAVENOUS at 20:49

## 2022-01-23 RX ADMIN — HEPARIN 100 UNITS: 100 SYRINGE at 20:50

## 2022-01-23 RX ADMIN — REMDESIVIR 100 MG: 100 INJECTION, POWDER, LYOPHILIZED, FOR SOLUTION INTRAVENOUS at 16:16

## 2022-01-23 RX ADMIN — IPRATROPIUM BROMIDE AND ALBUTEROL SULFATE 3 ML: .5; 3 SOLUTION RESPIRATORY (INHALATION) at 14:00

## 2022-01-23 RX ADMIN — SODIUM CHLORIDE: 9 INJECTION, SOLUTION INTRAVENOUS at 01:30

## 2022-01-23 RX ADMIN — ATORVASTATIN CALCIUM 10 MG: 10 TABLET, FILM COATED ORAL at 10:35

## 2022-01-23 RX ADMIN — DEXAMETHASONE 6 MG: 6 TABLET ORAL at 10:34

## 2022-01-23 RX ADMIN — BUDESONIDE 500 MCG: 0.5 INHALANT RESPIRATORY (INHALATION) at 06:05

## 2022-01-23 RX ADMIN — PIPERACILLIN SODIUM AND TAZOBACTAM SODIUM 3375 MG: 3; .375 INJECTION, POWDER, LYOPHILIZED, FOR SOLUTION INTRAVENOUS at 10:36

## 2022-01-23 RX ADMIN — DOCUSATE SODIUM 100 MG: 50 LIQUID ORAL at 10:35

## 2022-01-23 RX ADMIN — THIAMINE HYDROCHLORIDE 100 MG: 100 INJECTION, SOLUTION INTRAMUSCULAR; INTRAVENOUS at 10:35

## 2022-01-23 RX ADMIN — ALLOPURINOL 100 MG: 100 TABLET ORAL at 10:35

## 2022-01-23 RX ADMIN — Medication 10 ML: at 20:51

## 2022-01-23 RX ADMIN — HEPARIN SODIUM 5000 UNITS: 5000 INJECTION INTRAVENOUS; SUBCUTANEOUS at 20:50

## 2022-01-23 RX ADMIN — DAPTOMYCIN 400 MG: 500 INJECTION, POWDER, LYOPHILIZED, FOR SOLUTION INTRAVENOUS at 16:16

## 2022-01-23 RX ADMIN — PIPERACILLIN SODIUM AND TAZOBACTAM SODIUM 3375 MG: 3; .375 INJECTION, POWDER, LYOPHILIZED, FOR SOLUTION INTRAVENOUS at 22:48

## 2022-01-23 RX ADMIN — METOPROLOL TARTRATE 50 MG: 50 TABLET, FILM COATED ORAL at 22:48

## 2022-01-23 RX ADMIN — SODIUM CHLORIDE, PRESERVATIVE FREE 10 ML: 5 INJECTION INTRAVENOUS at 10:41

## 2022-01-23 RX ADMIN — SODIUM CHLORIDE: 9 INJECTION, SOLUTION INTRAVENOUS at 01:28

## 2022-01-23 RX ADMIN — IPRATROPIUM BROMIDE AND ALBUTEROL SULFATE 3 ML: .5; 3 SOLUTION RESPIRATORY (INHALATION) at 06:05

## 2022-01-23 RX ADMIN — SODIUM CHLORIDE, PRESERVATIVE FREE 10 ML: 5 INJECTION INTRAVENOUS at 20:51

## 2022-01-23 RX ADMIN — FOLIC ACID 1 MG: 1 TABLET ORAL at 10:35

## 2022-01-23 RX ADMIN — PANTOPRAZOLE SODIUM 40 MG: 40 INJECTION, POWDER, FOR SOLUTION INTRAVENOUS at 10:36

## 2022-01-23 RX ADMIN — FLUCONAZOLE 400 MG: 2 INJECTION INTRAVENOUS at 17:02

## 2022-01-23 RX ADMIN — LEVOTHYROXINE SODIUM 50 MCG: 0.05 TABLET ORAL at 10:35

## 2022-01-23 RX ADMIN — ACETAMINOPHEN 650 MG: 325 TABLET ORAL at 10:34

## 2022-01-23 RX ADMIN — IPRATROPIUM BROMIDE AND ALBUTEROL SULFATE 3 ML: .5; 3 SOLUTION RESPIRATORY (INHALATION) at 09:58

## 2022-01-23 ASSESSMENT — PAIN SCALES - GENERAL
PAINLEVEL_OUTOF10: 0

## 2022-01-23 ASSESSMENT — PAIN SCALES - WONG BAKER
WONGBAKER_NUMERICALRESPONSE: 0

## 2022-01-23 NOTE — PROGRESS NOTES
[facility] MD Adriana Betancourt MD. Yari Solano MD   Progress Note    1/22/2022    SUBJECTIVE:   1/13:Seen in her room , lying flat on o2/nc , euvolemic , confused   D/w RN on floor   1/14: Patient was seen, interviewed and examined in her room, overall feeling much better with less confusion today. Discussed the case in detail with the nurse, patient had history of kidney transplant in 2015, has maintained of 2 medications including cyclosporine and MMF. We will check with lab to obtain values both medications random throughout toxicity levels that could be responsible for her confusion at one point or another. 1/15: Patient was seen in her room. Case discussed with her son was informing me that, she was scheduled for expiratory laparotomy for probable obstruction of her bowels as per general surgery. We will follow clinical status closely with surgery or any other intervention needed from nephrology. 1/16: Patient underwent yesterday laparoscopic lysis of adhesions with no other pathology being found as per general surgery, will keep following her renal status closely with BUN today is 36 and creatinine down to 1.5 overall stable  1/17 : sleeping on bipap when seeing her   No LE edema   Cr creeping up   General surgery feel there is no intraabdominal pathology   1/18: Patient sleepy but arousable, input of general surgery appreciated clarifying that there was no pneumatosis abdomen distention thought to be secondary to sepsis the patient is then for different reasons. Patient has history of congestive heart failure for which cardiology is following she is sleeping on BiPAP with no lower extremity edema. Patient is status post transplant on cyclosporine and whole blood was sent for assessing the level.     1/19 : seen  In her room continue to be lethargic comfused   On TF   BP stable   Good UO via hassan     1/20 : seen in her room confused , on BIPAP ,per RN no major change in mental status   Appear euvolemic no LE edema   On renal TF   Per Staff plan to transfer to [facility] .     1/22: Does not respond to verbal or tactile stimuli. Remains on AVAPS. Tube feeds at 30 cc an hour, free water flushes 30 cc every 4 hours. Remains hypernatremic, increased 0.2 NS to 150 cc an hour    PROBLEM LIST:    Principal Problem:    Coma ([address]  Active Problems:    CECILY (acute kidney injury) ([address]    Ischemic bowel disease ([address]    Altered mental status  Resolved Problems:    * No resolved hospital problems.  *       DIET:    ADULT TUBE FEEDING; Nasogastric; Renal Formula; Continuous; 10; Yes; 10; Q 12 hours; 30; 30; Q 4 hours       Allergies : Macrodantin [nitrofurantoin macrocrystal] and Sulfa antibiotics    Past Medical History:   Diagnosis Date    Abnormal EKG     left bundle branch block    Acute gout involving toe of right foot 9/3/2020    Acute gout involving toe of right foot 9/3/2020    Acute on chronic combined systolic (congestive) and diastolic (congestive) heart failure (HCC)     Cancer (HCC)     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    Cerebrovascular disease     Clotting disorder ([address] 1985    thrombophlebitis after c section delivery    Depression     15 years followed by dr Tamie Fuller    Hyperlipidemia     Hypertension     Hyperthyroidism     Leg swelling 9/3/2020    Lymphedema of both lower extremities 9/3/2020    Peripheral vascular disease ([address]     Renal failure 11/2012    renal transplant    Thrombophlebitis     after c section delivery    Thyroid disease        Past Surgical History:   Procedure Laterality Date   [address]    one normal delivery    COLECTOMY N/A 1/15/2022    DIAGNOSTIC  LAPAROSCOPY LYSIS OF ADHESIONS performed by Buddy Demarco MD at [address] CATH LAB PROCEDURE  2006    normal coronaries and 1996 normal coronaries    DIALYSIS FISTULA CREATION  march and july 2012    phase one and two patient will start dialysis when needed   [address]    transfemoral venous bypass grafts    KIDNEY TRANSPLANT  2016    KIDNEY TRANSPLANT  2015    KIDNEY TRANSPLANT  2015    PARATHYROIDECTOMY  2006    left parathyroid  implant and parathyroidectomy       Family History   Problem Relation Age of Onset    Diabetes Mother     Diabetes Father     Dementia Father         reports that she quit smoking about 6 years ago. She has a 20.00 pack-year smoking history. She has never used smokeless tobacco. She reports that she does not drink alcohol and does not use drugs. Review of Systems:   Constitutional: no fevers , no chills , feels ok   Eyes: no eye pain , no itching , no drainage  Ears, nose, mouth, throat, and face: no ear ,nose pain , hearing is ok ,no nasal drainage   Respiratory: no sob ,no cough ,no wheezing . Cardiovascular: no chest pain , no palpitation ,no sob . Gastrointestinal: no nausea, vomiting , constipation , no abdominal pain . Genitourinary:no urinary retention , no burning , dysuria . No polyuria   Hematologic/lymphatic: no bleeding , no cougulation issues . Musculoskeletal:no joint pain , no swelling . Neurological: no headaches ,no weakness , no numbness . Endocrine: no thirst , no weight issues .      MEDS (scheduled):    daptomycin (CUBICIN) in NS IV syringe  6 mg/kg (Adjusted) IntraVENous Q24H    [START ON 1/23/2022] dexamethasone  6 mg Oral Daily    piperacillin-tazobactam  3,375 mg IntraVENous Q12H    [START ON 1/23/2022] remdesivir IVPB  100 mg IntraVENous Q24H    thiamine  100 mg IntraVENous Daily    lidocaine PF  5 mL IntraDERmal Once    sodium chloride flush  5-40 mL IntraVENous 2 times per day    heparin flush  3 mL IntraVENous 2 times per day    ipratropium-albuterol  3 mL Inhalation 4x daily    pantoprazole  40 mg IntraVENous BID    And    sodium chloride (PF)  10 mL IntraVENous BID    fluconazole  400 mg IntraVENous Q24H    metoprolol tartrate  50 mg Oral BID    docusate  100 mg Oral BID    heparin (porcine)  5,000 Units SubCUTAneous BID    heparin flush  1 mL IntraVENous 2 times per day    allopurinol  100 mg Oral Daily    budesonide  500 mcg Nebulization BID    folic acid  1 mg Oral Daily    levothyroxine  50 mcg Oral Daily    atorvastatin  10 mg Oral Daily    ondansetron  4 mg Oral Once       MEDS (infusions):   0.2% sodium chloride infusion 150 mL/hr (01/22/22 2125)    [START ON 1/23/2022] sodium chloride      sodium chloride      sodium chloride 25 mL (01/16/22 0035)       MEDS (prn):  sodium chloride, sodium chloride flush, sodium chloride, heparin flush, perflutren lipid microspheres, ondansetron **OR** ondansetron, polyethylene glycol, acetaminophen **OR** acetaminophen, sodium chloride flush, sodium chloride, heparin flush    PHYSICAL EXAM:     Patient Vitals for the past 24 hrs:   BP Temp Temp src Pulse Resp SpO2   01/22/22 2110 116/60 -- Axillary 97 21 95 %   01/22/22 1843 -- -- -- -- 24 --   01/22/22 1805 -- -- -- -- -- 94 %   01/22/22 1519 138/67 99.3 °F (37.4 °C) Axillary 92 22 (!) 88 %   01/22/22 1455 -- -- -- -- 22 --   01/22/22 0946 -- -- -- -- 24 --   01/22/22 0942 (!) 106/59 100.3 °F (37.9 °C) Axillary 117 24 94 %   01/22/22 0624 -- -- -- -- 22 --   01/22/22 0413 115/65 99.7 °F (37.6 °C) Axillary 108 20 96 %   @      Intake/Output Summary (Last 24 hours) at 1/22/2022 2157  Last data filed at 1/22/2022 1805  Gross per 24 hour   Intake 0 ml   Output 1400 ml   Net -1400 ml         Wt Readings from Last 3 Encounters:   01/19/22 216 lb (98 kg)   11/17/21 227 lb 3.2 oz (103.1 kg)   08/16/21 229 lb (103.9 kg)       Constitutional:  in no acute distress, on AVAPS, mildly tachypneic  Oral: mucus membranes dry  Neck: no JVD, trachea midline  Cardiovascular: S1, S2 regular rhythm, no murmur,or rub  Respiratory: Prolonged prior Tory phase, few scattered coarse crackles   Gastrointestinal:  Soft, nontender, nondistended, NABS  Ext: Dependent edema, feet warm  Skin: dry, no rash  Neuro: Unresponsive to verbal or tactile stimuli. Spontaneously moves all 4 extremities. DATA:    Recent Labs     01/20/22  0634   WBC 18.5*   HGB 10.5*   HCT 36.1   .2*   *     Recent Labs     01/20/22  0634 01/20/22  0634 01/21/22  0540 01/22/22  0600 01/22/22  1500   *   < > 149* 153* 153*   K 4.2   < > 4.2 4.2 4.3   *   < > 112* 114* 115*   CO2 26   < > 29 30* 29   MG 2.1  --  2.2 2.1  --    PHOS 2.2*  --  2.0* 2.7  --    BUN 61*   < > 55* 55* 58*   CREATININE 1.6*   < > 1.6* 1.8* 2.0*   ALT 6  --  9 15  --    AST 10  --  18 39*  --    BILITOT 0.4  --  0.4 0.4  --    ALKPHOS 68  --  67 64  --     < > = values in this interval not displayed. ASSESSMENT     1. Acute kidney injury, felt to be related to volume depletion and consists of diarrhea and poor p.o. intake. 2.  Chronic kidney disease, stage III. The patient with kidney  transplant. Baseline creatinine 1.5.  3. Immunosuppressed host, chronically on cyclosporin 100 mg b.i.d., CellCept 1000 mg b.i.d., and prednisone 5 mg. 4.  Sepsis. Need to rule out a C. diff. infection as the patient is reporting diarrhea. 5.  Obesity. 6.  Hypotension.     Azotemia drifting up as serum sodium also drifting up    RECOMMENDATIONS  Continue 1/4 NS drip, reduce rate to 84 (done) cc an hour  Increase free water flushes from 30 cc to 120 cc every 4 hours  Check BMP this evening, adjust IV fluid content and rate as warranted  Follow labs, next in a.m.     Electronically signed by Tutu Black MD on 1/22/2022

## 2022-01-23 NOTE — PROGRESS NOTES
Unresponsive  Objective:      Neuro exam 94/64 p108 t 101.7  General: comatose. Grimaces to tactile/noxious stim; O2 mask noted--groans when name called out  Cranial nerve testing  unable to cooperate. PERRL, corneal reflexes +  . Funduscopic eye exam revealed not testable. Motor exam: ..flaccid extremities  Deep tendon reflexes were absent bilaterally. Plantar responses were flexor bilaterally. Cerebellar exam noted . Ginger Neighbours Sensation was . .        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

## 2022-01-23 NOTE — PROGRESS NOTES
[facility]    [address] [place] [address] 80  [place], [address]  Phone [phone]   [phone]      Admit Date: 1/10/2022  6:12 PM  Pt Name: Eitan Horowitz  MRN: [id number]  : 1955  Reason for Consult:    Chief Complaint   Patient presents with    Fatigue     patient attempted to go to pcp today and didnt have the strength. came here instead. patient states sepsis prior to thanksgiving requiring admission and rehab. Requesting Physician:  Marla Dorado MD  PCP: Marla Dorado MD  History Obtained From:  patient, chart   ID consulted for CECILY (acute kidney injury) ([address] [N17.9]  on hospital day        Chief Complaint   Patient presents with    Fatigue     patient attempted to go to pcp today and didnt have the strength. came here instead. patient states sepsis prior to thanksgiving requiring admission and rehab. HISTORYOF PRESENT ILLNESS   Eitan Horowitz is a 73 y.o. female who presents with   has a past medical history of Abnormal EKG, Acute gout involving toe of right foot, Acute gout involving toe of right foot, Acute on chronic combined systolic (congestive) and diastolic (congestive) heart failure ([address] Cancer ([address] Cerebrovascular disease, Clotting disorder ([address] Depression, Hyperlipidemia, Hypertension, Hyperthyroidism, Leg swelling, Lymphedema of both lower extremities, Peripheral vascular disease ([address] Renal failure, Thrombophlebitis, and Thyroid disease. Fatigue  Associated symptoms include fatigue.      PT FOLLOWED UP WITH ID ON  S/P ATBX  STARTED ON FLUCONAZOLE/MYCOSTSATIN POWDER FOR INTERTRIGO   PT COMES IN NOW WITH FATIGUE DEC APPETITE NAUSEA   TMAX99.4 ON RA  CR2.5 WBC17.9  COVID NEG   BLOOD CX PENDING   ATBX was initiated   Not interactive  denies f/c  Just fatigue    Dos  22   Still the dame domingo ngt  Supine not able to arouse  COVID +  Fevers 101.9    2022  Nurse present  Pt on bipap ngt moans platt snot awake  Temp 100.3 tlsp944.8  1/21/2022  In bed bipap moans unable to obtain ros  Spoke with Dr Viral Rawls and Dr Shaka Bermeo   Faze322.8 cr1.6 [id number]   1/20/2022  PT IS SUPINE BIPAP NGT NOT STAYING AWAKE OVERALL EDEMA   T99 FIO2 40%  CR1.6 WBC18.5 [id number]    1/19/2022  Afebrile bipap40%  Cr1.7 wbc21.4 plt96  Spoke with Dr Shaka Bermeo  1/18/2022  bipap still lethargic  ngt  11/7/2022  BIPAP LETHARGIC DOES RESPONDS HAS NGT  1/16/2022   ptis in bed supine 6L afebrile   Barely wakes up   Wbc20.2 cr1.5    Urine cx E faecalis   Blood cx ngtd   S/p PROCEDURE:  Diagnostic laparoscopy with lysis of adhesions. No signs of ischemia. Intra-abdominal  adhesions. Minimal intra-abdominal fluid. No other signs of  intra-abdominal pathology.     REVIEW OF SYSTEMS     CONSTITUTIONAL:   as in hpi     CURRENT MEDICATIONS     Current Facility-Administered Medications:     albumin human 25 % IV solution 25 g, 25 g, IntraVENous, Once, Petrona Cohen MD    DAPTOmycin (CUBICIN) 400 mg in sodium chloride (PF) 8 mL IV syringe, 6 mg/kg (Adjusted), IntraVENous, Q24H, Reyes Barrier, MD, 400 mg at 01/23/22 1616    dexamethasone (DECADRON) tablet 6 mg, 6 mg, Oral, Daily, Flakito Flores MD, 6 mg at 01/23/22 1034    piperacillin-tazobactam (ZOSYN) 3,375 mg in dextrose 5 % 50 mL IVPB extended infusion (mini-bag), 3,375 mg, IntraVENous, Q12H, Reyes Barrier, MD, Stopped at 01/23/22 1436    0.9 % sodium chloride infusion, , IntraVENous, Q12H, Reyes Barrier, MD, Stopped at 01/23/22 0442    0.9 % sodium chloride bolus, 30 mL, IntraVENous, PRN, Reyes Barrier, MD    [Held by provider] remdesivir 100 mg in sodium chloride 0.9 % 250 mL IVPB, 100 mg, IntraVENous, Q24H, Reyes Barrier, MD, Stopped at 01/23/22 1646    lidocaine PF 1 % injection 5 mL, 5 mL, IntraDERmal, Once, Flakito Flores MD    sodium chloride flush 0.9 % injection 5-40 mL, 5-40 mL, IntraVENous, 2 times per day, Flakito Flores MD, 10 mL at 01/22/22 2114    sodium chloride flush 0.9 % injection 5-40 mL, 5-40 mL, IntraVENous, PRN, Noreen Ramirez MD    0.9 % sodium chloride infusion, 25 mL, IntraVENous, PRN, Noreen Ramirez MD    heparin flush 100 UNIT/ML injection 300 Units, 3 mL, IntraVENous, 2 times per day, Karyna Segovia MD, 300 Units at 01/22/22 2017    heparin flush 100 UNIT/ML injection 300 Units, 3 mL, IntraCATHeter, PRN, Karyna Segovia MD    perflutren lipid microspheres (DEFINITY) injection 1.65 mg, 1.5 mL, IntraVENous, ONCE PRN, Snehal Renteria MD    ipratropium-albuterol (DUONEB) nebulizer solution 3 mL, 3 mL, Inhalation, 4x daily, Karyna Segovia MD, 3 mL at 01/23/22 1400    pantoprazole (PROTONIX) injection 40 mg, 40 mg, IntraVENous, BID, 40 mg at 01/23/22 1036 **AND** sodium chloride (PF) 0.9 % injection 10 mL, 10 mL, IntraVENous, BID, Nicola Daly MD, 10 mL at 01/23/22 1041    fluconazole (DIFLUCAN) in 0.9 % sodium chloride IVPB 400 mg, 400 mg, IntraVENous, Q24H, Sandra Keenan MD, Last Rate: 100 mL/hr at 01/23/22 1702, 400 mg at 01/23/22 1702    metoprolol tartrate (LOPRESSOR) tablet 50 mg, 50 mg, Oral, BID, Susannah Ramirez MD, 50 mg at 01/22/22 2112    docusate (COLACE) 50 MG/5ML liquid 100 mg, 100 mg, Oral, BID, Susannah Ramirez MD, 100 mg at 01/23/22 1035    heparin (porcine) injection 5,000 Units, 5,000 Units, SubCUTAneous, BID, Josemanuel Morton MD, 5,000 Units at 01/22/22 2113    ondansetron (ZOFRAN-ODT) disintegrating tablet 4 mg, 4 mg, Oral, Q8H PRN, 4 mg at 01/11/22 1806 **OR** ondansetron (ZOFRAN) injection 4 mg, 4 mg, IntraVENous, Q6H PRN, Josemanuel Morton MD    polyethylene glycol [facility]) packet 17 g, 17 g, Oral, Daily PRN, Josemanuel Morton MD, 17 g at 01/13/22 1611    acetaminophen (TYLENOL) tablet 650 mg, 650 mg, Oral, Q6H PRN, 650 mg at 01/23/22 1034 **OR** acetaminophen (TYLENOL) suppository 650 mg, 650 mg, Rectal, Q6H PRN, Josemanuel Morton MD, 650 mg at 01/23/22 0761    sodium chloride flush 0.9 % injection 5-40 mL, 5-40 mL, IntraVENous, PRN, Cristina Bennett MD, 10 mL at 01/17/22 1601    0.9 % sodium chloride infusion, 25 mL, IntraVENous, PRN, Cristina Bennett MD, Last Rate: 100 mL/hr at 01/16/22 0035, 25 mL at 01/16/22 0035    heparin flush 100 UNIT/ML injection 100 Units, 1 mL, IntraVENous, 2 times per day, Cristina Bennett MD, 100 Units at 01/22/22 2016    heparin flush 100 UNIT/ML injection 100 Units, 1 mL, IntraCATHeter, PRN, Cristina Bennett MD    allopurinol (ZYLOPRIM) tablet 100 mg, 100 mg, Oral, Daily, Cristina Bennett MD, 100 mg at 01/23/22 1035    budesonide (PULMICORT) nebulizer suspension 500 mcg, 500 mcg, Nebulization, BID, Cristina Bennett MD, 500 mcg at 96/42/44 2285    folic acid (FOLVITE) tablet 1 mg, 1 mg, Oral, Daily, Cristina Bennett MD, 1 mg at 01/23/22 1035    levothyroxine (SYNTHROID) tablet 50 mcg, 50 mcg, Oral, Daily, Cristina Bennett MD, 50 mcg at 01/23/22 1035    atorvastatin (LIPITOR) tablet 10 mg, 10 mg, Oral, Daily, Cristina Bennett MD, 10 mg at 01/23/22 1035    ondansetron (ZOFRAN-ODT) disintegrating tablet 4 mg, 4 mg, Oral, Once, Cristina Bennett MD  ALLERGIES     Macrodantin [nitrofurantoin macrocrystal] and Sulfa antibiotics  Immunization History   Administered Date(s) Administered    COVID-19, Pfizer Purple top, DILUTE for use, 12+ yrs, 30mcg/0.3mL dose 01/28/2021, 02/18/2021, 08/18/2021      Internal Administration   First Dose COVID-19, Pfizer Purple top, DILUTE for use, 12+ yrs, 30mcg/0.3mL dose  01/28/2021   Second Dose COVID-19, Pfizer Purple top, DILUTE for use, 12+ yrs, 30mcg/0.3mL dose   02/18/2021       Last COVID Lab SARS-CoV-2 (no units)   Date Value   01/20/2021 Not Detected     SARS-CoV-2 Antibody, Total (no units)   Date Value   01/13/2022 Non-Reactive     SARS-CoV-2, PCR (no units)   Date Value   01/21/2022 DETECTED (A)     SARS-CoV-2, NAAT (no units)   Date Value   01/10/2022 Not Detected            PAST MEDICAL HISTORY     Past Medical History:   Diagnosis Date    Abnormal EKG     left bundle branch block    Acute gout involving toe of right foot 9/3/2020    Acute gout involving toe of right foot 9/3/2020    Acute on chronic combined systolic (congestive) and diastolic (congestive) heart failure (HCC)     Cancer (HCC)     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    Cerebrovascular disease     Clotting disorder ([facility] 1985    thrombophlebitis after c section delivery    Depression     15 years followed by dr Teresa Strong Hyperlipidemia     Hypertension     Hyperthyroidism     Leg swelling 9/3/2020    Lymphedema of both lower extremities 9/3/2020    Peripheral vascular disease ([facility]     Renal failure 11/2012    renal transplant    Thrombophlebitis     after c section delivery    Thyroid disease      SURGICAL HISTORY       Past Surgical History:   Procedure Laterality Date   [address]    one normal delivery    COLECTOMY N/A 1/15/2022    DIAGNOSTIC  LAPAROSCOPY LYSIS OF ADHESIONS performed by Shahid Lowe MD at [address] [place] [address] Drive CATH LAB PROCEDURE  2006    normal coronaries and 1996 normal coronaries    DIALYSIS FISTULA CREATION  march and july 2012    phase one and two patient will start dialysis when needed   [address]    transfemoral venous bypass grafts    KIDNEY TRANSPLANT  2016   [facility]  2015   [facility]  2015    PARATHYROIDECTOMY  2006    left parathyroid  implant and parathyroidectomy     PHYSICAL EXAM       Vitals:    01/23/22 1030 01/23/22 1113 01/23/22 1330 01/23/22 1615   BP: 94/64 97/60 (!) 90/53 105/61   Pulse: 106 103 91 91   Resp: 24  25 24   Temp: 101.7 °F (38.7 °C)  99.2 °F (37.3 °C) 99.5 °F (37.5 °C)   TempSrc: Axillary  Axillary Axillary   SpO2: 94%   93%   Weight:       Height:         Physical Exam     Constitutional/General:   IN BED    Head: NC/AT  Pulmonary: Lungs  DEc   to auscultation bilaterally ant .    on BIPAP  Cardiovascular:  Regular rate and rhythm   Abdomen: Soft, dec  BS. distension. NT   NGT  Extremities:     OVERALL Edema  Skin: no rash   Neurologic:    lethargic   RIGHT MIDLINE seeping   MERCHANT      DIAGNOSTIC RESULTS   RADIOLOGY:   CT ABDOMEN PELVIS WO CONTRAST Additional Contrast? None    Result Date: 1/10/2022  EXAMINATION: CT OF THE ABDOMEN AND PELVIS WITHOUT CONTRAST 1/10/2022 6:56 pm TECHNIQUE: CT of the abdomen and pelvis was performed without the administration of intravenous contrast. Multiplanar reformatted images are provided for review. Dose modulation, iterative reconstruction, and/or weight based adjustment of the mA/kV was utilized to reduce the radiation dose to as low as reasonably achievable. COMPARISON: CT abdomen pelvis 11/17/2021 HISTORY: ORDERING SYSTEM PROVIDED HISTORY: evaluate for diarrhea TECHNOLOGIST PROVIDED HISTORY: Reason for exam:->evaluate for diarrhea Additional Contrast?->None Decision Support Exception - unselect if not a suspected or confirmed emergency medical condition->Emergency Medical Condition (MA) FINDINGS: Lower Chest: Atherosclerotic disease. Atrophic kidneys with multiple cysts. Some of the presumed cysts appear hyperdense. Subcentimeter foci in the kidneys are too small to accurately characterize. There is a transplant kidney in the right lower quadrant. There is no hydronephrosis of the transplant kidney. Decreased perinephric stranding above the transplant kidney. Unremarkable bladder. Unremarkable appearance of the reproductive organs. No abdominal aortic aneurysm. Atherosclerotic disease. No evidence of cholecystitis or pancreatitis. No free air or significant free fluid. No evidence of bowel obstruction. Nondilated appendix. No significant fluid within the large bowel. Small fat containing umbilical hernia. Mild degenerative changes of the spine. Presumed surgical scars in the anterior abdominal wall.      No significant fluid within the large Results   Component Value Date    SEDRATE 61 (H) 01/23/2022    SEDRATE 62 (H) 11/20/2021     No results found for: FJODFAF3H4  Lab Results   Component Value Date    COVID19 DETECTED 01/21/2022     COVID-19/MARINA-COV2 LABS  Recent Labs     01/21/22  0540 01/22/22  0600 01/23/22  0515   CRP  --   --  18.3*   PROCAL  --   --  0.86*   FERRITIN  --   --  4,062   LDH  --   --  448*   DDIMER  --   --  2908   FIBRINOGEN  --   --  >700*   INR  --   --  1.1   PROTIME  --   --  13.0*   AST 18 39* 171*   ALT 9 15 87*           MICROBIOLOGY:  Lab Results   Component Value Date    BC 24 Hours no growth 01/21/2022    BC 5 Days no growth 01/11/2022    BC 5 Days no growth 11/17/2021    ORG Enterococcus faecalis 01/12/2022    ORG Klebsiella pneumoniae ssp pneumoniae 11/17/2021    ORG Staphylococcus coagulase-negative 11/17/2021    ORG Klebsiella pneumoniae ssp pneumoniae 11/17/2021     Lab Results   Component Value Date    BLOODCULT2 24 Hours no growth 01/21/2022    BLOODCULT2 5 Days no growth 01/12/2022    BLOODCULT2  11/17/2021     This organism was isolated in one set. Susceptibility testing is not routinely done as this  organism frequently represents skin contamination. Additional testing can be ordered by calling the  Microbiology Department.       ORG Enterococcus faecalis 01/12/2022    ORG Klebsiella pneumoniae ssp pneumoniae 11/17/2021    ORG Staphylococcus coagulase-negative 11/17/2021    ORG Klebsiella pneumoniae ssp pneumoniae 11/17/2021        Urine Culture, Routine   Date Value Ref Range Status   01/12/2022 >100,000 CFU/ml  Final   11/17/2021 <10,000 CFU/mL  Mixed gram positive organisms   (A)  Final   11/17/2021 >100,000 CFU/ml  Final     MRSA Culture Only   Date Value Ref Range Status   11/17/2021 Methicillin resistant Staph aureus not isolated  Final          FINAL IMPRESSION    Patient is a 73 y.o. female who presented with   Chief Complaint   Patient presents with    Fatigue     patient attempted to go to pcp today and didnt have the strength. came here instead. patient states sepsis prior to thanksgiving requiring admission and rehab.    and admitted for CECILY (acute kidney injury) ([facility] [N17.9]   IMMUNOCOMPROMISED PT RENAL TRANSPLANT  LEUKOCYTOSIS/fevers/thrombocytopenia   pneumatosis involving the ascending to transverse colon. S/p BOWEL RESECTION LAPAROSCOPIC  Lysis of adhesions/colitis  VSEnterococcus faecalis   uti    ? HCAP   CECILY    FUNGITELL + repeat  In am  hsv serology elevated igG,  IgM neg trending down   COVID+    S/P RX KLEBSIELLA/CONS BACTEREMIA  S/P RX E COLI UTI           DAPTOmycin (CUBICIN) 400 mg in sodium chloride (PF) 8 mL IV syringe, Q24H  dexamethasone (DECADRON) tablet 6 mg, Daily  piperacillin-tazobactam (ZOSYN) 3,375 mg in dextrose 5 % 50 mL IVPB extended infusion (mini-bag), Q12H change to meropenem   fluconazole (DIFLUCAN) in 0.9 % sodium chloride IVPB 400 mg, Q24H     Had remdesivir x1 on hold due to LFT   Follow biomarkers  Blood cx urine cx        Imaging and labs were reviewed per medical records       Thank you for involving me in the care of Mayte Martinez. Please do not hesitate to call for any questions or concerns.          Electronically signed by Efra aLne MD on 1/23/2022 at 6:12 PM

## 2022-01-23 NOTE — PROGRESS NOTES
Pharmacy Covid-19 Note      LFTs have increased approximately 500% since yesterday. Pharmacy will place remdesivir on hold until stabilized/improved due to risks of potentiation.       Hans Aguayo, PharmD 1/23/2022 4:57 PM   [phone]

## 2022-01-23 NOTE — PROGRESS NOTES
Pulmonary/Critical Care Progress Note    We are following patient for acute respiratory failure with hypoxia and hypercapnia, hypoventilation, acute mental status change, pneumatosis of the colon, colitis, UTI, history of kidney transplant, home immunosuppression agent, acute on chronic renal failure, hypothyroidism, history of bipolar disorder, COPD. In Summary:  Ronni Dakins is a 73 y.o. female with hx of Kidney TX, , CKD, Bipolar, morbid obesity who presents with pneumatosis of colon, altered mental status         ISSUES:        Acute Hypercapneic Respiratory Failure   COPD  Morbid Obesity  ? Baseline obesity hypoventialtion  On BiPAP  On IPAP  14     ABG with excellent compensation / ventilation and normal pH    Recent Labs     01/21/22  1723   PH 7.409   PCO2 48.9   O2SAT 91.3*          AMS  Unclear cause  No real change x days  Work up ongoing  Normal ABG excludes pulmonary cause for altered mental status     Pneumatosis of colon, no evidence of bowel obstruction  Colitis  Bowel adhesion  Continue IV fluid. NG tube to low wall intermittent suction. Being followed by GI / Surgery      UTI  History of kidney transplant, on immunosuppressant agents  Acute on chronic renal failure  cefepime/Zyvox/Diflucan/Flagyl per ID. Hypertension  Bipolar disorder  Hypothyroidism. History of     Will follow results    ____________________________________      SUBJECTIVE:  Patient seen and examined in room. She is still very somnolent barely arousable. She remains full code. Family requested to transfer patient to [facility] or [facility] when bed available.     MEDICATIONS:   daptomycin (CUBICIN) in NS IV syringe  6 mg/kg (Adjusted) IntraVENous Q24H    dexamethasone  6 mg Oral Daily    piperacillin-tazobactam  3,375 mg IntraVENous Q12H    remdesivir IVPB  100 mg IntraVENous Q24H    lidocaine PF  5 mL IntraDERmal Once    sodium chloride flush  5-40 mL IntraVENous 2 times per day    heparin flush  3 mL IntraVENous 2 times per day    ipratropium-albuterol  3 mL Inhalation 4x daily    pantoprazole  40 mg IntraVENous BID    And    sodium chloride (PF)  10 mL IntraVENous BID    fluconazole  400 mg IntraVENous Q24H    metoprolol tartrate  50 mg Oral BID    docusate  100 mg Oral BID    heparin (porcine)  5,000 Units SubCUTAneous BID    heparin flush  1 mL IntraVENous 2 times per day    allopurinol  100 mg Oral Daily    budesonide  500 mcg Nebulization BID    folic acid  1 mg Oral Daily    levothyroxine  50 mcg Oral Daily    atorvastatin  10 mg Oral Daily    ondansetron  4 mg Oral Once      sodium chloride Stopped (01/23/22 0442)    sodium chloride      sodium chloride 25 mL (01/16/22 0035)     sodium chloride, sodium chloride flush, sodium chloride, heparin flush, perflutren lipid microspheres, ondansetron **OR** ondansetron, polyethylene glycol, acetaminophen **OR** acetaminophen, sodium chloride flush, sodium chloride, heparin flush      REVIEW OF SYSTEMS:  Unable to obtain due to patient mental status. OBJECTIVE:  Vitals:    01/23/22 1113   BP: 97/60   Pulse: 103   Resp:    Temp:    SpO2:      FiO2 : 35 %  O2 Flow Rate (L/min): 5.5 L/min  O2 Device: PAP (positive airway pressure)    PHYSICAL EXAM:  Cardiovascular: Normal S1 and S2, no murmur. Respiratory: Diminished breathing sound bilaterally. Gastrointestinal: Distended, hypoactive bowel sounds.   Genitourinary: Unremarkable  Extremities: 1+ pitting edema on both lower extremity  Neurological: +++ lethargic    LABS:  WBC   Date Value Ref Range Status   01/23/2022 16.9 (H) 4.5 - 11.5 E9/L Final   01/20/2022 18.5 (H) 4.5 - 11.5 E9/L Final   01/19/2022 21.4 (H) 4.5 - 11.5 E9/L Final     Hemoglobin   Date Value Ref Range Status   01/23/2022 10.7 (L) 11.5 - 15.5 g/dL Final   01/20/2022 10.5 (L) 11.5 - 15.5 g/dL Final   01/19/2022 11.6 11.5 - 15.5 g/dL Final     Hematocrit   Date Value Ref Range Status   01/23/2022 35.5 34.0 - 48.0 % Final   01/20/2022 36.1 34.0 - 48.0 % Final   01/19/2022 37.3 34.0 - 48.0 % Final     MCV   Date Value Ref Range Status   01/23/2022 106.0 (H) 80.0 - 99.9 fL Final   01/20/2022 106.2 (H) 80.0 - 99.9 fL Final   01/19/2022 104.8 (H) 80.0 - 99.9 fL Final     Platelets   Date Value Ref Range Status   01/23/2022 82 (L) 130 - 450 E9/L Final   01/20/2022 113 (L) 130 - 450 E9/L Final   01/19/2022 96 (L) 130 - 450 E9/L Final     Sodium   Date Value Ref Range Status   01/23/2022 143 132 - 146 mmol/L Final   01/22/2022 148 (H) 132 - 146 mmol/L Final   01/22/2022 153 (H) 132 - 146 mmol/L Final     Potassium   Date Value Ref Range Status   01/23/2022 4.0 3.5 - 5.0 mmol/L Final   01/22/2022 4.2 3.5 - 5.0 mmol/L Final   01/22/2022 4.3 3.5 - 5.0 mmol/L Final     Potassium reflex Magnesium   Date Value Ref Range Status   01/10/2022 5.5 (H) 3.5 - 5.0 mmol/L Final     Comment:     Specimen is moderately Hemolyzed. Result may be artificially increased. 12/17/2019 5.2 (H) 3.5 - 5.0 mmol/L Final     Comment:     Specimen is moderately Hemolyzed. Result may be artificially increased.      Chloride   Date Value Ref Range Status   01/23/2022 108 (H) 98 - 107 mmol/L Final   01/22/2022 110 (H) 98 - 107 mmol/L Final   01/22/2022 115 (H) 98 - 107 mmol/L Final     CO2   Date Value Ref Range Status   01/23/2022 24 22 - 29 mmol/L Final   01/22/2022 27 22 - 29 mmol/L Final   01/22/2022 29 22 - 29 mmol/L Final     BUN   Date Value Ref Range Status   01/23/2022 52 (H) 6 - 23 mg/dL Final   01/22/2022 52 (H) 6 - 23 mg/dL Final   01/22/2022 58 (H) 6 - 23 mg/dL Final     CREATININE   Date Value Ref Range Status   01/23/2022 1.8 (H) 0.5 - 1.0 mg/dL Final   01/22/2022 1.9 (H) 0.5 - 1.0 mg/dL Final   01/22/2022 2.0 (H) 0.5 - 1.0 mg/dL Final     Glucose   Date Value Ref Range Status   01/23/2022 113 (H) 74 - 99 mg/dL Final   01/22/2022 118 (H) 74 - 99 mg/dL Final   01/22/2022 171 (H) 74 - 99 mg/dL Final     Calcium   Date Value Ref Range Status   01/23/2022 9.1 8.6 - 10.2 mg/dL Final 01/22/2022 9.2 8.6 - 10.2 mg/dL Final   01/22/2022 9.6 8.6 - 10.2 mg/dL Final     Total Protein   Date Value Ref Range Status   01/23/2022 4.6 (L) 6.4 - 8.3 g/dL Final   01/22/2022 5.1 (L) 6.4 - 8.3 g/dL Final   01/21/2022 5.3 (L) 6.4 - 8.3 g/dL Final     Albumin   Date Value Ref Range Status   01/23/2022 2.1 (L) 3.5 - 5.2 g/dL Final   01/22/2022 2.3 (L) 3.5 - 5.2 g/dL Final   01/21/2022 2.7 (L) 3.5 - 5.2 g/dL Final     Total Bilirubin   Date Value Ref Range Status   01/23/2022 0.5 0.0 - 1.2 mg/dL Final   01/22/2022 0.4 0.0 - 1.2 mg/dL Final   01/21/2022 0.4 0.0 - 1.2 mg/dL Final     Alkaline Phosphatase   Date Value Ref Range Status   01/23/2022 104 35 - 104 U/L Final   01/22/2022 64 35 - 104 U/L Final   01/21/2022 67 35 - 104 U/L Final     AST   Date Value Ref Range Status   01/23/2022 171 (H) 0 - 31 U/L Final     Comment:     Specimen is slightly Hemolyzed. Result may be artificially increased. 01/22/2022 39 (H) 0 - 31 U/L Final   01/21/2022 18 0 - 31 U/L Final     ALT   Date Value Ref Range Status   01/23/2022 87 (H) 0 - 32 U/L Final   01/22/2022 15 0 - 32 U/L Final   01/21/2022 9 0 - 32 U/L Final     GFR Non-   Date Value Ref Range Status   01/23/2022 28 >=60 mL/min/1.73 Final     Comment:     Chronic Kidney Disease: less than 60 ml/min/1.73 sq.m. Kidney Failure: less than 15 ml/min/1.73 sq.m. Results valid for patients 18 years and older. 01/22/2022 26 >=60 mL/min/1.73 Final     Comment:     Chronic Kidney Disease: less than 60 ml/min/1.73 sq.m. Kidney Failure: less than 15 ml/min/1.73 sq.m. Results valid for patients 18 years and older. 01/22/2022 25 >=60 mL/min/1.73 Final     Comment:     Chronic Kidney Disease: less than 60 ml/min/1.73 sq.m. Kidney Failure: less than 15 ml/min/1.73 sq.m. Results valid for patients 18 years and older.        GFR    Date Value Ref Range Status   01/23/2022 34  Final   01/22/2022 32  Final   01/22/2022 30 Final     Magnesium   Date Value Ref Range Status   01/23/2022 1.9 1.6 - 2.6 mg/dL Final   01/22/2022 2.1 1.6 - 2.6 mg/dL Final   01/21/2022 2.2 1.6 - 2.6 mg/dL Final     Phosphorus   Date Value Ref Range Status   01/23/2022 2.5 2.5 - 4.5 mg/dL Final   01/22/2022 2.7 2.5 - 4.5 mg/dL Final   01/21/2022 2.0 (L) 2.5 - 4.5 mg/dL Final     Recent Labs     01/21/22  1723   PH 7.409   BE 5.2*   O2SAT 91.3*       RADIOLOGY:  XR CHEST PORTABLE   Final Result   1. Essentially stable chest series. Persistent retrocardiac and left   greater than right basilar opacities. Atherosclerotic disease and   cardiomegaly. 2.  Right upper extremity PICC line is either new or replaced previous   catheter which previously was seen over the right axilla. Enteric tube as   above. CT ABDOMEN PELVIS WO CONTRAST Additional Contrast? None   Final Result   1. Colitis involving the left colon from splenic flexure to mid to distal   sigmoid colon. 2. Atrophy of native kidneys. Multiple renal cysts and nonobstructing left   renal calculus. Transplant kidney in right iliac fossa. 3. Trace ascites superficial to the liver. 4. Unchanged consolidation in lower lobes. 5. Unchanged cardiomegaly. 6. Possible gallbladder sludge and or cholelithiasis. RECOMMENDATIONS:   Unavailable         XR ABDOMEN (KUB) (SINGLE AP VIEW)   Final Result   No evidence of bowel obstruction. CT CHEST WO CONTRAST   Final Result   1. Bilateral lower lobe alveolar opacities compatible with pneumonia. Increased consolidation in the right lower lobe compared to the prior study. 2.  Trace effusions and trace pericardial effusion with left ventricular   dilatation. 3.  No evidence to suggest empyema. 4.  Gastric catheter passes into the stomach. XR CHEST PORTABLE   Final Result   Infiltrate and or atelectasis left greater than right with mildly improving   aeration in the left mid lung.          MRI BRAIN WO CONTRAST Final Result   No acute intracranial abnormality. XR ABDOMEN FOR NG/OG/NE TUBE PLACEMENT   Final Result   NG tube in good position. XR CHEST PORTABLE   Final Result   Cardiomegaly. Findings for an infiltrate with some consolidation in the mid lower aspect of   the left lung. CT ABDOMEN PELVIS WO CONTRAST Additional Contrast? Oral   Final Result   There is pneumatosis involving the ascending to transverse colon. There is   wall thickening of the splenic flexure to descending colon. This is   nonspecific but can be seen related to ischemic enteritis but there is no   evidence for portal venous gas,, medication induced pneumatosis in a patient   taking steroids or chemotherapy, procedure related. Clinical correlation   recommended. Surgical consultation may be needed. There is fairly dense bibasilar infiltrates which have progressed from prior   exam.  Correlation with fever and white count and follow-up to resolution   recommended. Multiple mixed density cysts both kidneys related to chronic renal failure   with the transplant right lower quadrant. There is distention of small bowel in the lower pelvis without obstructing   lesion identified. Small hiatal hernia. XR ABDOMEN (KUB) (SINGLE AP VIEW)   Final Result   Abundant gas and stool throughout the colon may represent constipation. XR CHEST PORTABLE   Final Result   1. Stable cardiomegaly. There is no evidence of failure or pneumonia. CT ABDOMEN PELVIS WO CONTRAST Additional Contrast? None   Final Result   No significant fluid within the large bowel. No hydronephrosis of the transplant kidneys. The native kidneys are atrophic   and have multiple intermediate density cysts which should be further   evaluated with nonemergent ultrasound. Atherosclerotic disease.          IR MIDLINE CATH    (Results Pending)           Electronically signed by KATINA Grider CNP on 1/23/2022 at 1:06 PM

## 2022-01-24 ENCOUNTER — APPOINTMENT (OUTPATIENT)
Dept: NEUROLOGY | Age: 67
DRG: 673 | End: 2022-01-24
Payer: MEDICARE

## 2022-01-24 LAB
ALBUMIN SERPL-MCNC: 2.3 G/DL (ref 3.5–5.2)
ALP BLD-CCNC: 103 U/L (ref 35–104)
ALT SERPL-CCNC: 90 U/L (ref 0–32)
ANION GAP SERPL CALCULATED.3IONS-SCNC: 9 MMOL/L (ref 7–16)
AST SERPL-CCNC: 119 U/L (ref 0–31)
BILIRUB SERPL-MCNC: 0.3 MG/DL (ref 0–1.2)
BUN BLDV-MCNC: 57 MG/DL (ref 6–23)
CALCIUM SERPL-MCNC: 9.2 MG/DL (ref 8.6–10.2)
CHLORIDE BLD-SCNC: 109 MMOL/L (ref 98–107)
CO2: 26 MMOL/L (ref 22–29)
CREAT SERPL-MCNC: 1.9 MG/DL (ref 0.5–1)
GFR AFRICAN AMERICAN: 32
GFR NON-AFRICAN AMERICAN: 26 ML/MIN/1.73
GLUCOSE BLD-MCNC: 145 MG/DL (ref 74–99)
MAGNESIUM: 2.1 MG/DL (ref 1.6–2.6)
PHOSPHORUS: 3.6 MG/DL (ref 2.5–4.5)
POTASSIUM SERPL-SCNC: 3.9 MMOL/L (ref 3.5–5)
SODIUM BLD-SCNC: 144 MMOL/L (ref 132–146)
TOTAL PROTEIN: 4.7 G/DL (ref 6.4–8.3)

## 2022-01-24 PROCEDURE — A4216 STERILE WATER/SALINE, 10 ML: HCPCS | Performed by: HOSPITALIST

## 2022-01-24 PROCEDURE — C9113 INJ PANTOPRAZOLE SODIUM, VIA: HCPCS | Performed by: HOSPITALIST

## 2022-01-24 PROCEDURE — 6360000002 HC RX W HCPCS: Performed by: CLINICAL NURSE SPECIALIST

## 2022-01-24 PROCEDURE — 6360000002 HC RX W HCPCS: Performed by: HOSPITALIST

## 2022-01-24 PROCEDURE — 87070 CULTURE OTHR SPECIMN AEROBIC: CPT

## 2022-01-24 PROCEDURE — 6360000002 HC RX W HCPCS: Performed by: SURGERY

## 2022-01-24 PROCEDURE — 83735 ASSAY OF MAGNESIUM: CPT

## 2022-01-24 PROCEDURE — 95816 EEG AWAKE AND DROWSY: CPT

## 2022-01-24 PROCEDURE — 84100 ASSAY OF PHOSPHORUS: CPT

## 2022-01-24 PROCEDURE — 94640 AIRWAY INHALATION TREATMENT: CPT

## 2022-01-24 PROCEDURE — 94660 CPAP INITIATION&MGMT: CPT

## 2022-01-24 PROCEDURE — 97110 THERAPEUTIC EXERCISES: CPT

## 2022-01-24 PROCEDURE — 87040 BLOOD CULTURE FOR BACTERIA: CPT

## 2022-01-24 PROCEDURE — 80053 COMPREHEN METABOLIC PANEL: CPT

## 2022-01-24 PROCEDURE — 2700000000 HC OXYGEN THERAPY PER DAY

## 2022-01-24 PROCEDURE — 6370000000 HC RX 637 (ALT 250 FOR IP): Performed by: INTERNAL MEDICINE

## 2022-01-24 PROCEDURE — 6360000002 HC RX W HCPCS: Performed by: INTERNAL MEDICINE

## 2022-01-24 PROCEDURE — 2580000003 HC RX 258: Performed by: INTERNAL MEDICINE

## 2022-01-24 PROCEDURE — 2580000003 HC RX 258: Performed by: SPECIALIST

## 2022-01-24 PROCEDURE — 6360000002 HC RX W HCPCS: Performed by: SPECIALIST

## 2022-01-24 PROCEDURE — 92610 EVALUATE SWALLOWING FUNCTION: CPT

## 2022-01-24 PROCEDURE — 2060000000 HC ICU INTERMEDIATE R&B

## 2022-01-24 PROCEDURE — 2580000003 HC RX 258: Performed by: CLINICAL NURSE SPECIALIST

## 2022-01-24 PROCEDURE — 36415 COLL VENOUS BLD VENIPUNCTURE: CPT

## 2022-01-24 PROCEDURE — A4216 STERILE WATER/SALINE, 10 ML: HCPCS | Performed by: SPECIALIST

## 2022-01-24 PROCEDURE — 6370000000 HC RX 637 (ALT 250 FOR IP): Performed by: SURGERY

## 2022-01-24 PROCEDURE — 2500000003 HC RX 250 WO HCPCS: Performed by: SPECIALIST

## 2022-01-24 PROCEDURE — 2580000003 HC RX 258: Performed by: HOSPITALIST

## 2022-01-24 RX ADMIN — HEPARIN SODIUM 5000 UNITS: 5000 INJECTION INTRAVENOUS; SUBCUTANEOUS at 09:49

## 2022-01-24 RX ADMIN — HEPARIN 100 UNITS: 100 SYRINGE at 09:00

## 2022-01-24 RX ADMIN — IPRATROPIUM BROMIDE AND ALBUTEROL SULFATE 3 ML: .5; 3 SOLUTION RESPIRATORY (INHALATION) at 06:12

## 2022-01-24 RX ADMIN — LEVOTHYROXINE SODIUM 50 MCG: 0.05 TABLET ORAL at 09:49

## 2022-01-24 RX ADMIN — MEROPENEM 1000 MG: 1 INJECTION INTRAVENOUS at 14:19

## 2022-01-24 RX ADMIN — DOCUSATE SODIUM 100 MG: 50 LIQUID ORAL at 09:46

## 2022-01-24 RX ADMIN — DAPTOMYCIN 400 MG: 500 INJECTION, POWDER, LYOPHILIZED, FOR SOLUTION INTRAVENOUS at 17:13

## 2022-01-24 RX ADMIN — METOPROLOL TARTRATE 50 MG: 50 TABLET, FILM COATED ORAL at 22:38

## 2022-01-24 RX ADMIN — FLUCONAZOLE 400 MG: 2 INJECTION INTRAVENOUS at 15:22

## 2022-01-24 RX ADMIN — SODIUM CHLORIDE: 9 INJECTION, SOLUTION INTRAVENOUS at 02:26

## 2022-01-24 RX ADMIN — PANTOPRAZOLE SODIUM 40 MG: 40 INJECTION, POWDER, FOR SOLUTION INTRAVENOUS at 09:49

## 2022-01-24 RX ADMIN — ATORVASTATIN CALCIUM 10 MG: 10 TABLET, FILM COATED ORAL at 09:49

## 2022-01-24 RX ADMIN — REMDESIVIR 100 MG: 100 INJECTION, POWDER, LYOPHILIZED, FOR SOLUTION INTRAVENOUS at 14:26

## 2022-01-24 RX ADMIN — ALLOPURINOL 100 MG: 100 TABLET ORAL at 09:49

## 2022-01-24 RX ADMIN — METOPROLOL TARTRATE 50 MG: 50 TABLET, FILM COATED ORAL at 09:49

## 2022-01-24 RX ADMIN — IPRATROPIUM BROMIDE AND ALBUTEROL SULFATE 3 ML: .5; 3 SOLUTION RESPIRATORY (INHALATION) at 13:17

## 2022-01-24 RX ADMIN — Medication 10 ML: at 09:43

## 2022-01-24 RX ADMIN — IPRATROPIUM BROMIDE AND ALBUTEROL SULFATE 3 ML: .5; 3 SOLUTION RESPIRATORY (INHALATION) at 17:03

## 2022-01-24 RX ADMIN — SODIUM CHLORIDE: 9 INJECTION, SOLUTION INTRAVENOUS at 13:33

## 2022-01-24 RX ADMIN — Medication 10 ML: at 22:13

## 2022-01-24 RX ADMIN — HEPARIN 300 UNITS: 100 SYRINGE at 09:43

## 2022-01-24 RX ADMIN — PIPERACILLIN SODIUM AND TAZOBACTAM SODIUM 3375 MG: 3; .375 INJECTION, POWDER, LYOPHILIZED, FOR SOLUTION INTRAVENOUS at 09:05

## 2022-01-24 RX ADMIN — DEXAMETHASONE 6 MG: 6 TABLET ORAL at 09:49

## 2022-01-24 RX ADMIN — IPRATROPIUM BROMIDE AND ALBUTEROL SULFATE 3 ML: .5; 3 SOLUTION RESPIRATORY (INHALATION) at 09:02

## 2022-01-24 RX ADMIN — PANTOPRAZOLE SODIUM 40 MG: 40 INJECTION, POWDER, FOR SOLUTION INTRAVENOUS at 22:09

## 2022-01-24 RX ADMIN — SODIUM CHLORIDE, PRESERVATIVE FREE 10 ML: 5 INJECTION INTRAVENOUS at 09:44

## 2022-01-24 RX ADMIN — FOLIC ACID 1 MG: 1 TABLET ORAL at 09:49

## 2022-01-24 RX ADMIN — BUDESONIDE 500 MCG: 0.5 INHALANT RESPIRATORY (INHALATION) at 17:03

## 2022-01-24 RX ADMIN — BUDESONIDE 500 MCG: 0.5 INHALANT RESPIRATORY (INHALATION) at 06:12

## 2022-01-24 RX ADMIN — HEPARIN 300 UNITS: 100 SYRINGE at 22:12

## 2022-01-24 RX ADMIN — HEPARIN SODIUM 5000 UNITS: 5000 INJECTION INTRAVENOUS; SUBCUTANEOUS at 22:38

## 2022-01-24 RX ADMIN — SODIUM CHLORIDE, PRESERVATIVE FREE 10 ML: 5 INJECTION INTRAVENOUS at 22:14

## 2022-01-24 ASSESSMENT — PAIN SCALES - WONG BAKER
WONGBAKER_NUMERICALRESPONSE: 0

## 2022-01-24 ASSESSMENT — PAIN SCALES - GENERAL
PAINLEVEL_OUTOF10: 0

## 2022-01-24 NOTE — PROGRESS NOTES
[facility]    [address] [place] [address] 80  [place], [address]  Phone [phone]   [phone]      Admit Date: 1/10/2022  6:12 PM  Pt Name: Merlyn Lombardo  MRN: [id number]  : 1955  Reason for Consult:    Chief Complaint   Patient presents with    Fatigue     patient attempted to go to pcp today and didnt have the strength. came here instead. patient states sepsis prior to thanksgiving requiring admission and rehab. Requesting Physician:  Elgin Kerr MD  PCP: Elgin Kerr MD  History Obtained From:  patient, chart   ID consulted for CECILY (acute kidney injury) ([address] [N17.9]  on hospital day 13   Face to face encounter   [facility]       Chief Complaint   Patient presents with    Fatigue     patient attempted to go to pcp today and didnt have the strength. came here instead. patient states sepsis prior to thanksgiving requiring admission and rehab. HISTORYOF PRESENT ILLNESS   Merlyn Lombardo is a 73 y.o. female who presents with   has a past medical history of Abnormal EKG, Acute gout involving toe of right foot, Acute gout involving toe of right foot, Acute on chronic combined systolic (congestive) and diastolic (congestive) heart failure ([address] Cancer ([address] Cerebrovascular disease, Clotting disorder ([address] Depression, Hyperlipidemia, Hypertension, Hyperthyroidism, Leg swelling, Lymphedema of both lower extremities, Peripheral vascular disease ([address] Renal failure, Thrombophlebitis, and Thyroid disease. Fatigue  Associated symptoms include fatigue.      PT FOLLOWED UP WITH ID ON  S/P ATBX  STARTED ON FLUCONAZOLE/MYCOSTSATIN POWDER FOR INTERTRIGO   PT COMES IN NOW WITH FATIGUE DEC APPETITE NAUSEA   TMAX--99.4 ON RA  CR2.5 WBC17.9  COVID NEG   BLOOD CX PENDING   ATBX was initiated   Not interactive  denies f/c  Just fatigue    Dos  22   Patient is awake- alert- confused- but answering questions and talking  Patient has NG tube- currently on 3 liters nasal canula     1/23/2022   Still the same bipap ngt  Supine not able to arouse  COVID +  Fevers 101.9    1/22/2022  Nurse present  Pt on bipap ngt moans platt snot awake  Temp 100.3 tlbu006.8  1/21/2022  In bed bipap moans unable to obtain ros  Spoke with Dr Denise Landeros and Dr Guilherme Edwards   Qabk660.8 cr1.6 [phone]   1/20/2022  PT IS SUPINE BIPAP NGT NOT STAYING AWAKE OVERALL EDEMA   T99 FIO2 40%  CR1.6 WBC18.5 [id number]    1/19/2022  Afebrile bipap40%  Cr1.7 wbc21.4 plt96  Spoke with Dr Guilherme Edwards  1/18/2022  bipap still lethargic  ngt    11/7/2022  BIPAP LETHARGIC DOES RESPONDS HAS NGT    1/16/2022    ptis in bed supine 6L afebrile   Barely wakes up   Wbc20.2 cr1.5    Urine cx E faecalis   Blood cx ngtd   S/p PROCEDURE:  Diagnostic laparoscopy with lysis of adhesions. No signs of ischemia. Intra-abdominal  adhesions. Minimal intra-abdominal fluid. No other signs of  intra-abdominal pathology.     REVIEW OF SYSTEMS     CONSTITUTIONAL:   as in hpi     CURRENT MEDICATIONS     Current Facility-Administered Medications:     DAPTOmycin (CUBICIN) 400 mg in sodium chloride (PF) 8 mL IV syringe, 6 mg/kg (Adjusted), IntraVENous, Q24H, Haider Dutta MD, 400 mg at 01/23/22 1616    dexamethasone (DECADRON) tablet 6 mg, 6 mg, Oral, Daily, Susannah Ramirez MD, 6 mg at 01/24/22 0949    piperacillin-tazobactam (ZOSYN) 3,375 mg in dextrose 5 % 50 mL IVPB extended infusion (mini-bag), 3,375 mg, IntraVENous, Q12H, Haider Dutta MD, Stopped at 01/24/22 0227    0.9 % sodium chloride infusion, , IntraVENous, Q12H, Haider Dutta MD, Stopped at 01/24/22 0426    0.9 % sodium chloride bolus, 30 mL, IntraVENous, PRN, aHider Dutta MD    remdesivir 100 mg in sodium chloride 0.9 % 250 mL IVPB, 100 mg, IntraVENous, Q24H, Haider Dutta MD, Stopped at 01/23/22 1646    lidocaine PF 1 % injection 5 mL, 5 mL, IntraDERmal, Once, Merlyn Holly MD    sodium chloride flush 0.9 % injection 5-40 mL, 5-40 mL, IntraVENous, 2 times per day, Giovana Villavicencio MD, 10 mL at 01/24/22 6857    sodium chloride flush 0.9 % injection 5-40 mL, 5-40 mL, IntraVENous, PRN, Clarisse Ramirez MD    0.9 % sodium chloride infusion, 25 mL, IntraVENous, PRN, Clarisse Ramirez MD    heparin flush 100 UNIT/ML injection 300 Units, 3 mL, IntraVENous, 2 times per day, Giovana Villavicencio MD, 300 Units at 01/24/22 0943    heparin flush 100 UNIT/ML injection 300 Units, 3 mL, IntraCATHeter, PRN, Giovana Villavicencio MD    perflutren lipid microspheres (DEFINITY) injection 1.65 mg, 1.5 mL, IntraVENous, ONCE PRN, Concetta Renteria MD    ipratropium-albuterol (DUONEB) nebulizer solution 3 mL, 3 mL, Inhalation, 4x daily, Giovana Villavicencio MD, 3 mL at 01/24/22 1317    pantoprazole (PROTONIX) injection 40 mg, 40 mg, IntraVENous, BID, 40 mg at 01/24/22 0949 **AND** sodium chloride (PF) 0.9 % injection 10 mL, 10 mL, IntraVENous, BID, Gaby Rojas MD, 10 mL at 01/24/22 0944    fluconazole (DIFLUCAN) in 0.9 % sodium chloride IVPB 400 mg, 400 mg, IntraVENous, Q24H, Olivier Waite MD, Last Rate: 100 mL/hr at 01/23/22 1702, 400 mg at 01/23/22 1702    metoprolol tartrate (LOPRESSOR) tablet 50 mg, 50 mg, Oral, BID, Susannah Ramirez MD, 50 mg at 01/24/22 0949    docusate (COLACE) 50 MG/5ML liquid 100 mg, 100 mg, Oral, BID, Susannah Ramirez MD, 100 mg at 01/23/22 1035    heparin (porcine) injection 5,000 Units, 5,000 Units, SubCUTAneous, BID, Paula Feng MD, 5,000 Units at 01/24/22 0949    ondansetron (ZOFRAN-ODT) disintegrating tablet 4 mg, 4 mg, Oral, Q8H PRN, 4 mg at 01/11/22 1806 **OR** ondansetron (ZOFRAN) injection 4 mg, 4 mg, IntraVENous, Q6H PRN, Paula Feng MD    polyethylene glycol [facility]) packet 17 g, 17 g, Oral, Daily PRN, Paula Feng MD, 17 g at 01/13/22 1611    acetaminophen (TYLENOL) tablet 650 mg, 650 mg, Oral, Q6H PRN, 650 mg at 01/23/22 1034 **OR** acetaminophen (TYLENOL) suppository 650 mg, 650 mg, Rectal, Q6H PRN, Darrell Gaona MD, 650 mg at 01/23/22 0525    sodium chloride flush 0.9 % injection 5-40 mL, 5-40 mL, IntraVENous, PRN, Darrell Gaona MD, 10 mL at 01/17/22 1601    0.9 % sodium chloride infusion, 25 mL, IntraVENous, PRN, Darrell Gaona MD, Last Rate: 100 mL/hr at 01/16/22 0035, 25 mL at 01/16/22 0035    heparin flush 100 UNIT/ML injection 100 Units, 1 mL, IntraVENous, 2 times per day, Darrell Gaona MD, 100 Units at 01/24/22 0900    heparin flush 100 UNIT/ML injection 100 Units, 1 mL, IntraCATHeter, PRN, Darrell Gaona MD    allopurinol (ZYLOPRIM) tablet 100 mg, 100 mg, Oral, Daily, Darrell Gaona MD, 100 mg at 01/24/22 2111    budesonide (PULMICORT) nebulizer suspension 500 mcg, 500 mcg, Nebulization, BID, Darrell Gaona MD, 500 mcg at 44/96/79 6735    folic acid (FOLVITE) tablet 1 mg, 1 mg, Oral, Daily, Darrell Gaona MD, 1 mg at 01/24/22 9806    levothyroxine (SYNTHROID) tablet 50 mcg, 50 mcg, Oral, Daily, Darrell Gaona MD, 50 mcg at 01/24/22 8239    atorvastatin (LIPITOR) tablet 10 mg, 10 mg, Oral, Daily, Darrell Gaona MD, 10 mg at 01/24/22 0949    ondansetron (ZOFRAN-ODT) disintegrating tablet 4 mg, 4 mg, Oral, Once, Darrell Gaona MD  ALLERGIES     Macrodantin [nitrofurantoin macrocrystal] and Sulfa antibiotics  Immunization History   Administered Date(s) Administered    COVID-19, Pfizer Purple top, DILUTE for use, 12+ yrs, 30mcg/0.3mL dose 01/28/2021, 02/18/2021, 08/18/2021      Internal Administration   First Dose COVID-19, Pfizer Purple top, DILUTE for use, 12+ yrs, 30mcg/0.3mL dose  01/28/2021   Second Dose COVID-19, Pfizer Purple top, DILUTE for use, 12+ yrs, 30mcg/0.3mL dose   02/18/2021       Last COVID Lab SARS-CoV-2 (no units)   Date Value   01/20/2021 Not Detected     SARS-CoV-2 Antibody, Total (no units)   Date Value   01/13/2022 Non-Reactive     SARS-CoV-2, PCR (no units)   Date Value   01/21/2022 DETECTED (A)     SARS-CoV-2, NAAT (no units)   Date Value 01/10/2022 Not Detected            PAST MEDICAL HISTORY     Past Medical History:   Diagnosis Date    Abnormal EKG     left bundle branch block    Acute gout involving toe of right foot 9/3/2020    Acute gout involving toe of right foot 9/3/2020    Acute on chronic combined systolic (congestive) and diastolic (congestive) heart failure (HCC)     Cancer (HCC)     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    Cerebrovascular disease     Clotting disorder ([facility] 1985    thrombophlebitis after c section delivery    Depression     15 years followed by dr Cristin Rubio Hyperlipidemia     Hypertension     Hyperthyroidism     Leg swelling 9/3/2020    Lymphedema of both lower extremities 9/3/2020    Peripheral vascular disease ([facility]     Renal failure 11/2012    renal transplant    Thrombophlebitis     after c section delivery    Thyroid disease      SURGICAL HISTORY       Past Surgical History:   Procedure Laterality Date   [facility]    one normal delivery    COLECTOMY N/A 1/15/2022    DIAGNOSTIC  LAPAROSCOPY LYSIS OF ADHESIONS performed by Keven Sanchez MD at [address] [place] [address] Drive CATH LAB PROCEDURE  2006    normal coronaries and 1996 normal coronaries    DIALYSIS FISTULA CREATION  march and july 2012    phase one and two patient will start dialysis when needed   [address]    transfemoral venous bypass grafts    KIDNEY TRANSPLANT  2016   [address]  2015    KIDNEY TRANSPLANT  2015    PARATHYROIDECTOMY  2006    left parathyroid  implant and parathyroidectomy     PHYSICAL EXAM       Vitals:    01/24/22 0630 01/24/22 0900 01/24/22 0902 01/24/22 0945   BP:  (!) 122/50     Pulse:  81     Resp:  20     Temp:  97.4 °F (36.3 °C)     TempSrc:  Infrared     SpO2: 94% 96% 97% 94%   Weight:       Height:         Physical Exam     Constitutional/General:   IN BED  - awake- has NG  Head: NC/AT  Pulmonary: Lungs  DEc   to auscultation bilaterally ant . On 3 liters nasal canula. Cardiovascular:  Regular rate and rhythm   Abdomen: Soft, dec  BS. distension. Non tender- has NG  Extremities:     + Edema  Skin: no rash   Neurologic:      RIGHT MIDLINE seeping   MERCHANT      DIAGNOSTIC RESULTS   RADIOLOGY:   CT ABDOMEN PELVIS WO CONTRAST Additional Contrast? None    Result Date: 1/10/2022  EXAMINATION: CT OF THE ABDOMEN AND PELVIS WITHOUT CONTRAST 1/10/2022 6:56 pm TECHNIQUE: CT of the abdomen and pelvis was performed without the administration of intravenous contrast. Multiplanar reformatted images are provided for review. Dose modulation, iterative reconstruction, and/or weight based adjustment of the mA/kV was utilized to reduce the radiation dose to as low as reasonably achievable. COMPARISON: CT abdomen pelvis 11/17/2021 HISTORY: ORDERING SYSTEM PROVIDED HISTORY: evaluate for diarrhea TECHNOLOGIST PROVIDED HISTORY: Reason for exam:->evaluate for diarrhea Additional Contrast?->None Decision Support Exception - unselect if not a suspected or confirmed emergency medical condition->Emergency Medical Condition (MA) FINDINGS: Lower Chest: Atherosclerotic disease. Atrophic kidneys with multiple cysts. Some of the presumed cysts appear hyperdense. Subcentimeter foci in the kidneys are too small to accurately characterize. There is a transplant kidney in the right lower quadrant. There is no hydronephrosis of the transplant kidney. Decreased perinephric stranding above the transplant kidney. Unremarkable bladder. Unremarkable appearance of the reproductive organs. No abdominal aortic aneurysm. Atherosclerotic disease. No evidence of cholecystitis or pancreatitis. No free air or significant free fluid. No evidence of bowel obstruction. Nondilated appendix. No significant fluid within the large bowel. Small fat containing umbilical hernia. Mild degenerative changes of the spine. Presumed surgical scars in the anterior abdominal wall.      No significant fluid within the large bowel. No hydronephrosis of the transplant kidneys. The native kidneys are atrophic and have multiple intermediate density cysts which should be further evaluated with nonemergent ultrasound. Atherosclerotic disease. XR CHEST PORTABLE    Result Date: 1/11/2022  EXAMINATION: ONE XRAY VIEW OF THE CHEST 1/11/2022 9:11 am COMPARISON: None. HISTORY: ORDERING SYSTEM PROVIDED HISTORY: shortness of breath TECHNOLOGIST PROVIDED HISTORY: Reason for exam:->shortness of breath FINDINGS: The heart is enlarged. There are no findings of failure. The lungs are clear. There is no focal infiltrate or effusion. 1. Stable cardiomegaly. There is no evidence of failure or pneumonia. LABS  Recent Labs     01/23/22  0515   WBC 16.9*   HGB 10.7*   HCT 35.5   .0*   PLT 82*     Recent Labs     01/22/22  0600 01/22/22  1500 01/22/22  2131 01/22/22  2131 01/23/22  0515 01/23/22  0515 01/24/22  0535   *   < > 148*  --  143  --  144   K 4.2   < > 4.2   < > 4.0   < > 3.9   *   < > 110*   < > 108*   < > 109*   CO2 30*   < > 27   < > 24   < > 26   BUN 55*   < > 52*  --  52*  --  57*   CREATININE 1.8*   < > 1.9*  --  1.8*  --  1.9*   GFRAA 34   < > 32  --  34  --  32   LABGLOM 28   < > 26  --  28  --  26   GLUCOSE 144*   < > 118*  --  113*  --  145*   PROT 5.1*  --   --   --  4.6*  --  4.7*   LABALBU 2.3*  --   --   --  2.1*  --  2.3*   CALCIUM 10.0   < > 9.2  --  9.1  --  9.2   BILITOT 0.4  --   --   --  0.5  --  0.3   ALKPHOS 64  --   --   --  104  --  103   AST 39*  --   --   --  171*  --  119*   ALT 15  --   --   --  87*  --  90*    < > = values in this interval not displayed.      Recent Labs     01/23/22  0515   PROCAL 0.86*     Lab Results   Component Value Date    CRP 18.3 (H) 01/23/2022    CRP 34.6 (H) 11/17/2021     Lab Results   Component Value Date    SEDRATE 61 (H) 01/23/2022    SEDRATE 62 (H) 11/20/2021     No results found for: [id number]  Lab Results   Component (acute kidney injury) ([facility] [N17.9]   IMMUNOCOMPROMISED PT RENAL TRANSPLANT  LEUKOCYTOSIS/fevers/thrombocytopenia   pneumatosis involving the ascending to transverse colon. S/p BOWEL RESECTION LAPAROSCOPIC  Lysis of adhesions/colitis  VSEnterococcus faecalis   uti    ? HCAP   CECILY    FUNGITELL + repeat  In am  hsv serology elevated igG,  IgM neg trending down   COVID+    S/P RX KLEBSIELLA/CONS BACTEREMIA  S/P RX E COLI UTI      Plan:   · Currently on Daptomycin and zosyn  · Change zosyn to meropenem   · Continue diflucan for now  · On steroids   · LFTs have improved- Remdesivir- restarted per pharmacy   · Cultures pending- blood and urine  · Watch neurological status and respiratory status   · No bed at [facility] as of yet. · WBC- 16.9    Platelets- 82     Imaging and labs were reviewed per medical records       Thank you for involving me in the care of Henry Guerrero. Please do not hesitate to call for any questions or concerns. Electronically signed by KATINA Paredes on 1/24/2022 at 1:49 PM    As above    The patient has COVID-19 infection. To prevent self exposure and cross contamination care will be coordinated with patient's care team. All relevant records and diagnostic tests were reviewed in EMR, including laboratory results and imaging. I have directed the medical decision making, and  POC with the NURSE PRACTITIONER, KATINA Paredes.      More awake per staff  D/c form abd wound  remdesivir restarted    covid   respiratory failure  immunocompromised pt   Sepsis  VSE UTI   HCAP  Fungitell++  SBO/colitis     meropenem (MERREM) 1,000 mg in sodium chloride 0.9 % 100 mL IVPB (mini-bag), Q12H  DAPTOmycin (CUBICIN) 400 mg in sodium chloride (PF) 8 mL IV syringe, Q24H  dexamethasone (DECADRON) tablet 6 mg, Daily  remdesivir 100 mg in sodium chloride 0.9 % 250 mL IVPB, Q24H  fluconazole (DIFLUCAN) in 0.9 % sodium chloride IVPB 400 mg, Q24H     Cont rx        Imaging and labs were reviewed. Thank you for involving me in the care of Idris Ayala. Please do not hesitate to call for any questions or concerns.     Electronically signed by Rolan Montanez MD on 1/24/2022 at 4:01 PM

## 2022-01-24 NOTE — PROGRESS NOTES
session. Patient's RUE cold to touch, nursing present as well as wound care, pulses were assess and present bilaterally UEs. Pillows placed under each arm for comfort. Patient requires continued skilled physical therapy to address concerns listed above for increased safety and function at discharge. PHYSICAL THERAPY  PLAN OF CARE       Physical therapy plan of care is established based on physician order,  patient diagnosis and clinical assessment    Current Treatment Recommendations:    -Bed Mobility: Lower extremity exercises  and Trunk control activities   -Standing Balance: Perform strengthening exercises in standing to promote motor control with or without upper extremity support  and Instruct patient on adequate base of support to maintain balance  -Transfers: Provide instruction on proper hand and foot position for adequate transfer of weight onto lower extremities and use of gait device if needed and Cues for hand placement, technique and safety. Provide stabilization to prevent fall   -Gait: Gait training, Standing activities to improve: base of support, weight shift, weight bearing  and Exercises to improve hip and knee control   -Endurance: Utilize Supervised activities to increase level of endurance to allow for safe functional mobility including transfers and gait     PT long term treatment goals are located in below grid    Patient and or family understand(s) diagnosis, prognosis, and plan of care. Frequency of treatments: Patient will be seen  3-5 times/week.          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    Cerebrovascular disease     Clotting disorder ([facility] [address] 1985    thrombophlebitis after c section delivery    Depression     15 years followed by dr Saul Yanez Hyperlipidemia     Hypertension     Hyperthyroidism     Leg swelling 9/3/2020    Lymphedema of both lower extremities 9/3/2020    Peripheral vascular disease ([facility]     Renal failure 11/2012    renal transplant    Thrombophlebitis     after c section delivery    Thyroid disease      Past Surgical History:   Procedure Laterality Date   [address]    one normal delivery    COLECTOMY N/A 1/15/2022    DIAGNOSTIC  LAPAROSCOPY LYSIS OF ADHESIONS performed by Kade Herndon MD at [address] [facility] [address] CATH LAB PROCEDURE  2006    normal coronaries and 1996 normal coronaries    DIALYSIS FISTULA CREATION  march and july 2012    phase one and two patient will start dialysis when needed   [address]    transfemoral venous bypass grafts    KIDNEY TRANSPLANT  2016   [facility]  2015   [facility]  2015    PARATHYROIDECTOMY  2006    left parathyroid  implant and parathyroidectomy       SUBJECTIVE:    Precautions:  Up with assistance, falls and alarm ,    Social history: Patient lives alone in a apartment    with Elevator  to enter     Tub shower grab bars    Equipment owned: Rollator and Shower chair,       [address] [facility]   How much difficulty turning over in bed?: Unable  How much difficulty sitting down on / standing up from a chair with arms?: Unable  How much difficulty moving from lying on back to sitting on side of bed?: Unable  How much help from another person moving to and from a bed to a chair?: Total  How much help from another person needed to walk in hospital room?: Total  How much help from another person for climbing 3-5 steps with a railing?: Total  AM-PAC Inpatient Mobility Raw Score : 6  AM-PAC Inpatient T-Scale Score : 23.55  Mobility Inpatient CMS 0-100% Score: 100  Mobility Inpatient CMS G-Code Modifier : CN    Nursing cleared patient for PT treatment. .   OBJECTIVE;   Initial Evaluation  Date: 1/11/2022 Treatment Date:  1/24/2022       Short Term/ Long Term   Goals   Was pt agreeable to Eval/treatment? Yes yes To be met in 3 days   Pain level   0/10    0/10    Bed Mobility    Rolling: Minimal assist of 1    Supine to sit: Moderate assist of 1    Sit to supine: Moderate assist of 1    Scooting: Minimal assist of 1   Rolling: Not assessed    Supine to sit: Not assessed    Sit to supine: Not assessed    Scooting: Not assessed     Rolling: Independent    Supine to sit: Independent    Sit to supine: Independent    Scooting: Independent     Transfers Sit to stand: Minimal assist of 1   Sit to stand: Not assessed       Sit to stand: Independent     Ambulation    35 feet using  wheeled walker with Moderate assist of 1   for one instance of knee buckle otherwise min a  Walker approximation and safety not assessed     100 feet using  least restrictive device versus no device with Independent    ROM Within functional limits        Strength BUE:  refer to OT eval  RLE:  3+/5  LLE:  3+/5  Increase strength in affected mm groups by 1/3 grade   Balance Sitting EOB:  fair    Dynamic Standing:  fair with wheeled walker  Sitting EOB: not assessed   Dynamic Standing: not assessed    Sitting EOB:  good    Dynamic Standing: good with wheeled walker      Patient is Alert & Oriented x person and does not follow directions    Sensation:  Patient  denies numbness/tingling   Edema:  yes bilateral lower extremities   Endurance: fair          Patient education  Patient educated on role of Physical Therapy, risks of immobility, safety and plan of care,  importance of mobility while in hospital  and importance and purpose of adaptive device and adjusted to proper height for the patient.      Patient response to education:   Pt verbalized understanding Pt demonstrated skill Pt requires further education in this area   Yes Partial Yes      Treatment:  Patient practiced and was instructed/facilitated in the following treatment:   Patient able to AROM ankles and fingers but PROM for rest of exercises. placed pillows for skin integrity and edema control. Therapeutic Exercises:  ankle pumps, heel slide, hip abduction/adduction, straight leg raise and finger flexion/extension, x 15 reps. At end of session, patient in bed with  call light and phone within reach,  all lines and tubes intact, nursing notified. Patient would benefit from continued skilled Physical Therapy to improve functional independence and quality of life.          Patient's/ family goals   home    Time in 1101  Time out  [id number]  Total Treatment Time  9 minutes      CPT codes:  Therapeutic exercises (80129)   9 minutes  1 unit(s)    Dayna Thompson #[id number]

## 2022-01-24 NOTE — PLAN OF CARE
Problem: Skin Integrity:  Goal: Will show no infection signs and symptoms  Description: Will show no infection signs and symptoms  Outcome: Met This Shift  Goal: Absence of new skin breakdown  Description: Absence of new skin breakdown  Outcome: Met This Shift     Problem: Falls - Risk of:  Goal: Will remain free from falls  Description: Will remain free from falls  Outcome: Met This Shift  Goal: Absence of physical injury  Description: Absence of physical injury  Outcome: Met This Shift     Problem: OXYGENATION/RESPIRATORY FUNCTION  Goal: Patient will maintain patent airway  Outcome: Met This Shift     Problem: HEMODYNAMIC STATUS  Goal: Patient has stable vital signs and fluid balance  Outcome: Met This Shift     Problem: Airway Clearance - Ineffective  Goal: Achieve or maintain patent airway  Outcome: Met This Shift     Problem: Gas Exchange - Impaired  Goal: Absence of hypoxia  Outcome: Met This Shift  Goal: Promote optimal lung function  Outcome: Met This Shift     Problem: Breathing Pattern - Ineffective  Goal: Ability to achieve and maintain a regular respiratory rate  Outcome: Met This Shift     Problem:  Body Temperature -  Risk of, Imbalanced  Goal: Ability to maintain a body temperature within defined limits  Outcome: Met This Shift  Goal: Will regain or maintain usual level of consciousness  Outcome: Met This Shift  Goal: Complications related to the disease process, condition or treatment will be avoided or minimized  Outcome: Met This Shift     Problem: Isolation Precautions - Risk of Spread of Infection  Goal: Prevent transmission of infection  Outcome: Met This Shift     Problem: Nutrition Deficits  Goal: Optimize nutritional status  Outcome: Met This Shift     Problem: Risk for Fluid Volume Deficit  Goal: Maintain normal heart rhythm  Outcome: Met This Shift  Goal: Maintain absence of muscle cramping  Outcome: Met This Shift  Goal: Maintain normal serum potassium, sodium, calcium, phosphorus, and pH  Outcome: Met This Shift     Problem: Fatigue  Goal: Verbalize increase energy and improved vitality  Outcome: Met This Shift     Problem: Loneliness or Risk for Loneliness  Goal: Demonstrate positive use of time alone when socialization is not possible  Outcome: Met This Shift     Problem: Patient Education: Go to Patient Education Activity  Goal: Patient/Family Education  Outcome: Met This Shift

## 2022-01-24 NOTE — PROGRESS NOTES
SPEECH/LANGUAGE PATHOLOGY  CLINICAL ASSESSMENT OF SWALLOWING FUNCTION   and PLAN OF CARE    PATIENT NAME:  Mat Stanley  (female)     MRN:  [id number]    :  1955  (73 y.o.)  STATUS:  Inpatient: Room 0640/0640-01    TODAY'S DATE:  2022  REFERRING PROVIDER:   Susannah Ramirez MD  SPECIFIC PROVIDER ORDER: SLP eval and treat Date of order:  22  REASON FOR REFERRAL: To assess oropharyngeal function/determine least restrictive diet   EVALUATING THERAPIST: CHAPARRITA Hartley                 RESULTS:    DYSPHAGIA DIAGNOSIS:   Clinical indicators of mild-moderate oral phase dysphagia       DIET RECOMMENDATIONS:  Pureed consistency solids (IDDSI level 4) with  thin liquids (IDDSI level 0)     FEEDING RECOMMENDATIONS:     Assistance level:  Supervision is needed during all oral intake      Compensatory strategies recommended: Small bites/sips and Alternate solids and liquids      Discussed recommendations with nursing and/or faxed report to referring provider: Yes    SPEECH THERAPY  PLAN OF CARE   The dysphagia POC is established based on physician order, dysphagia diagnosis and results of clinical assessment     Meal time assessment for 1-2 sessions to provide diet modification and compensatory strategy implementation due to inconsistent episodes of clinical indicators of dysphagia during intake    Conditions Requiring Skilled Therapeutic Intervention for dysphagia:    Patient is performing below functional baseline d/t  current acute condition, Multiple diagnoses, multiple medications, and increased dependency upon caregivers.   Reduced oral control of bolus     Specific dysphagia interventions to include:     Compensatory strategy training   Trials of upgraded diet/liquid   Meal time assessment for 1-2 sessions to provide diet modification and compensatory strategy implementation due to inconsistent episodes of clinical indicators of dysphagia during intake    Specific instructions for next treatment:  development and training of compensatory swallow strategies to improve airway protection and swallow function, therapeutic po trial to determine safety of advanced diet textures and consistencies and ongoing PO analysis to upgrade diet and evaluate tolerance of current PO recommendation  Patient Treatment Goals:    Short Term Goals:  Pt will implement identified compensatory swallowing strategies on 90% of opportunities or greater to improve airway protection and swallow function. Pt will participate in ongoing mealtime assessment to provide diet modification and compensatory strategy implementation to minimize risk of aspiration associated with PO intake  Pt will complete PO trials of upgraded diet textures with SLP only to determine the least restrictive PO diet to maintain adequate nutrition/hydration with no more than 1 overt s/s of pen/asp. Long Term Goals:   Pt will maintain adequate nutrition/hydration via PO intake of the least restrictive oral diet with implementation of safe swallow/ compensatory strategies and decrease signs/symptoms of aspiration to less than 1 x/day. Patient/family Goal:    Did not state. Will further assess during treatment.     Plan of care discussed with Patient   The Patient did not demonstrate complete understanding of the diagnosis, prognosis and plan of care     Rehabilitation Potential/Prognosis: fair                    ADMITTING DIAGNOSIS: CECILY (acute kidney injury) ([facility] [address] [N17.9]    VISIT DIAGNOSIS:   Visit Diagnoses       Codes    Transplanted kidney     Z94.0           PATIENT REPORT/COMPLAINT: denies difficulty swallowing  RN cleared patient for participation in assessment     yes     PRIOR LEVEL OF SWALLOW FUNCTION:    PAST HISTORY OF DYSPHAGIA?: yes    Home diet: Regular consistency solids (IDDSI level 7) with  thin liquids (IDDSI level 0)  Current Diet Order:  ADULT TUBE FEEDING; Nasogastric; Renal Formula; Continuous; 40; No; 30; Q 4 hours    PROCEDURE:  Consistencies Administered During the Evaluation   Liquids: thin liquid   Solids:  pureed foods and solid foods      Method of Intake:   cup, straw, spoon  Fed by clinician      Position:   Seated, upright    CLINICAL ASSESSMENT:  Oral Stage:       Decreased mastication due to:  cognitive function  Delayed A-P transit due to: cognitive function   Oral residuals were noted :  throughout the oral cavity      Pharyngeal Stage:    Latent cough was noted after presentation of thin liquid via tsp x1 only. Patient consumed consecutive straw/cup sips w/ no further s/s of pen/aspiration noted. Cognition:   Confusion noted    Oral Peripheral Examination   Generalized oral weakness    Current Respiratory Status    3 liters nasal cannula     Parameters of Speech Production  Respiration:  Adequate for speech production  Quality:   Within functional limits  Intensity: Within functional limits    Volitional Swallow: present     Volitional Cough:   present     Pain: No pain reported. EDUCATION:   The Speech Language Pathologist (SLP) completed education regarding results of evaluation and that intervention is warranted at this time. Learner: Patient  Education: Reviewed results and recommendations of this evaluation  Evaluation of Education:  Needs further instruction    This plan may be re-evaluated and revised as warranted. Evaluation Time includes thorough review of current medical information, gathering information on past medical history/social history and prior level of function, completion of standardized testing/informal observation of tasks, assessment of data and education on plan of care and goals. [x]The admitting diagnosis and active problem list, have been reviewed prior to initiation of this evaluation.         ACTIVE PROBLEM LIST:   Patient Active Problem List   Diagnosis    Peripheral vascular disease ([facility] [address]    Depression    Clotting disorder (HCC)    Abnormal EKG    Cancer ([address]    Over weight    S/p cadaver renal transplant    ESRD (end stage renal disease) ([facility] [address]    Non morbid obesity due to excess calories    Hypertension    Leg swelling    Lymphedema of both lower extremities    Acute gout involving toe of right foot    Sepsis secondary to UTI ([address]    Acute kidney injury superimposed on CKD ([address]    Thyroid disease    Acute on chronic combined systolic (congestive) and diastolic (congestive) heart failure (HCC)    Sepsis ([address]    Acute renal failure ([address]    CECILY (acute kidney injury) ([address]    Ischemic bowel disease ([address]    Altered mental status    Coma ([address]         CPT code:  52630  bedside swallow carolyn Mason SLP

## 2022-01-24 NOTE — PROGRESS NOTES
[facility] MD Eloisa Khan MD. Rosina Jauregui MD   Progress Note    1/23/2022    SUBJECTIVE:   1/13:Seen in her room , lying flat on o2/nc , euvolemic , confused   D/w RN on floor   1/14: Patient was seen, interviewed and examined in her room, overall feeling much better with less confusion today. Discussed the case in detail with the nurse, patient had history of kidney transplant in 2015, has maintained of 2 medications including cyclosporine and MMF. We will check with lab to obtain values both medications random throughout toxicity levels that could be responsible for her confusion at one point or another. 1/15: Patient was seen in her room. Case discussed with her son was informing me that, she was scheduled for expiratory laparotomy for probable obstruction of her bowels as per general surgery. We will follow clinical status closely with surgery or any other intervention needed from nephrology. 1/16: Patient underwent yesterday laparoscopic lysis of adhesions with no other pathology being found as per general surgery, will keep following her renal status closely with BUN today is 36 and creatinine down to 1.5 overall stable  1/17 : sleeping on bipap when seeing her   No LE edema   Cr creeping up   General surgery feel there is no intraabdominal pathology   1/18: Patient sleepy but arousable, input of general surgery appreciated clarifying that there was no pneumatosis abdomen distention thought to be secondary to sepsis the patient is then for different reasons. Patient has history of congestive heart failure for which cardiology is following she is sleeping on BiPAP with no lower extremity edema. Patient is status post transplant on cyclosporine and whole blood was sent for assessing the level.     1/19 : seen  In her room continue to be lethargic comfused   On TF   BP stable   Good UO via hassan     1/20 : seen in her room confused , on BIPAP ,per RN no major change in mental status   Appear euvolemic no LE edema   On renal TF   Per Staff plan to transfer to [facility] .     : Does not respond to verbal or tactile stimuli. Remains on AVAPS. Tube feeds at 30 cc an hour, free water flushes 30 cc every 4 hours. Remains hypernatremic, increased 0.2 NS to 150 cc an hour    : Moved to the 6 floor. Apparently was awake, and interacting with her bedside RN earlier. At the time of my evaluation was asleep, somnolent, difficult to arouse. NG tube was partially dislodged, and so free water flushes and tube feed have been suspended for the past several hours. Hypotensive earlier today, received albumin bolus    PROBLEM LIST:    Principal Problem:    Coma ([address]  Active Problems:    CECILY (acute kidney injury) ([address]    Ischemic bowel disease ([address]    Altered mental status  Resolved Problems:    * No resolved hospital problems.  *       DIET:    ADULT TUBE FEEDING; Nasogastric; Renal Formula; Continuous; 40; No; 30; Q 4 hours       Allergies : Macrodantin [nitrofurantoin macrocrystal] and Sulfa antibiotics    Past Medical History:   Diagnosis Date    Abnormal EKG     left bundle branch block    Acute gout involving toe of right foot 9/3/2020    Acute gout involving toe of right foot 9/3/2020    Acute on chronic combined systolic (congestive) and diastolic (congestive) heart failure (HCC)     Cancer (HCC)     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    Cerebrovascular disease     Clotting disorder ([address] 1985    thrombophlebitis after c section delivery    Depression     15 years followed by dr Levi Corea    Hyperlipidemia     Hypertension     Hyperthyroidism     Leg swelling 9/3/2020    Lymphedema of both lower extremities 9/3/2020    Peripheral vascular disease ([address]     Renal failure 2012    renal transplant    Thrombophlebitis     after c section delivery    Thyroid disease        Past Surgical History:   Procedure Laterality Date     SECTION 1985    one normal delivery    COLECTOMY N/A 1/15/2022    DIAGNOSTIC  LAPAROSCOPY LYSIS OF ADHESIONS performed by Alma Ceron MD at [address] [place] [address] Drive CATH LAB PROCEDURE  2006    normal coronaries and 1996 normal coronaries    DIALYSIS FISTULA CREATION  march and july 2012    phase one and two patient will start dialysis when needed   [address]    transfemoral venous bypass grafts    KIDNEY TRANSPLANT  2016    KIDNEY TRANSPLANT  2015    KIDNEY TRANSPLANT  2015    PARATHYROIDECTOMY  2006    left parathyroid  implant and parathyroidectomy       Family History   Problem Relation Age of Onset    Diabetes Mother     Diabetes Father     Dementia Father         reports that she quit smoking about 6 years ago. She has a 20.00 pack-year smoking history. She has never used smokeless tobacco. She reports that she does not drink alcohol and does not use drugs. Review of Systems:   Constitutional: no fevers , no chills , feels ok   Eyes: no eye pain , no itching , no drainage  Ears, nose, mouth, throat, and face: no ear ,nose pain , hearing is ok ,no nasal drainage   Respiratory: no sob ,no cough ,no wheezing . Cardiovascular: no chest pain , no palpitation ,no sob . Gastrointestinal: no nausea, vomiting , constipation , no abdominal pain . Genitourinary:no urinary retention , no burning , dysuria . No polyuria   Hematologic/lymphatic: no bleeding , no cougulation issues . Musculoskeletal:no joint pain , no swelling . Neurological: no headaches ,no weakness , no numbness . Endocrine: no thirst , no weight issues .      MEDS (scheduled):    albumin human  25 g IntraVENous Once    daptomycin (CUBICIN) in NS IV syringe  6 mg/kg (Adjusted) IntraVENous Q24H    dexamethasone  6 mg Oral Daily    piperacillin-tazobactam  3,375 mg IntraVENous Q12H    [Held by provider] remdesivir IVPB  100 mg IntraVENous Q24H    lidocaine PF  5 mL IntraDERmal Once    sodium chloride flush  5-40 mL IntraVENous 2 times per day    heparin flush  3 mL IntraVENous 2 times per day    ipratropium-albuterol  3 mL Inhalation 4x daily    pantoprazole  40 mg IntraVENous BID    And    sodium chloride (PF)  10 mL IntraVENous BID    fluconazole  400 mg IntraVENous Q24H    metoprolol tartrate  50 mg Oral BID    docusate  100 mg Oral BID    heparin (porcine)  5,000 Units SubCUTAneous BID    heparin flush  1 mL IntraVENous 2 times per day    allopurinol  100 mg Oral Daily    budesonide  500 mcg Nebulization BID    folic acid  1 mg Oral Daily    levothyroxine  50 mcg Oral Daily    atorvastatin  10 mg Oral Daily    ondansetron  4 mg Oral Once       MEDS (infusions):   sodium chloride Stopped (01/23/22 0442)    sodium chloride      sodium chloride 25 mL (01/16/22 0035)       MEDS (prn):  sodium chloride, sodium chloride flush, sodium chloride, heparin flush, perflutren lipid microspheres, ondansetron **OR** ondansetron, polyethylene glycol, acetaminophen **OR** acetaminophen, sodium chloride flush, sodium chloride, heparin flush    PHYSICAL EXAM:     Patient Vitals for the past 24 hrs:   BP Temp Temp src Pulse Resp SpO2   01/23/22 1945 118/71 97.7 °F (36.5 °C) Infrared 86 20 93 %   01/23/22 1830 130/66 98 °F (36.7 °C) -- 90 17 96 %   01/23/22 1615 105/61 99.5 °F (37.5 °C) Axillary 91 24 93 %   01/23/22 1330 (!) 90/53 99.2 °F (37.3 °C) Axillary 91 25 --   01/23/22 1113 97/60 -- -- 103 -- --   01/23/22 1030 94/64 101.7 °F (38.7 °C) Axillary 106 24 94 %   01/23/22 0615 130/64 101.9 °F (38.8 °C) Axillary 102 25 --   01/23/22 0452 133/64 102.2 °F (39 °C) Axillary 109 24 93 %   @      Intake/Output Summary (Last 24 hours) at 1/23/2022 2128  Last data filed at 1/23/2022 1627  Gross per 24 hour   Intake 2096 ml   Output 1050 ml   Net 1046 ml         Wt Readings from Last 3 Encounters:   01/19/22 216 lb (98 kg)   11/17/21 227 lb 3.2 oz (103.1 kg)   08/16/21 229 lb (103.9 kg)       Constitutional: in no acute distress, on AVAPS, mildly tachypneic  Oral: mucus membranes dry  Neck: no JVD, trachea midline  Cardiovascular: S1, S2 regular rhythm, no murmur,or rub  Respiratory: Prolonged prior Tory phase, few scattered coarse crackles   Gastrointestinal:  Soft, nontender, nondistended, NABS  Ext: Dependent edema, feet warm  Skin: dry, no rash  Neuro: Unresponsive to verbal or tactile stimuli. Spontaneously moves all 4 extremities. DATA:    Recent Labs     01/23/22  0515   WBC 16.9*   HGB 10.7*   HCT 35.5   .0*   PLT 82*     Recent Labs     01/21/22  0540 01/21/22  0540 01/22/22  0600 01/22/22  0600 01/22/22  1500 01/22/22  2131 01/23/22  0515   *   < > 153*   < > 153* 148* 143   K 4.2   < > 4.2   < > 4.3 4.2 4.0   *   < > 114*   < > 115* 110* 108*   CO2 29   < > 30*   < > 29 27 24   MG 2.2  --  2.1  --   --   --  1.9   PHOS 2.0*  --  2.7  --   --   --  2.5   BUN 55*   < > 55*   < > 58* 52* 52*   CREATININE 1.6*   < > 1.8*   < > 2.0* 1.9* 1.8*   ALT 9  --  15  --   --   --  87*   AST 18  --  39*  --   --   --  171*   BILITOT 0.4  --  0.4  --   --   --  0.5   ALKPHOS 67  --  64  --   --   --  104    < > = values in this interval not displayed. ASSESSMENT     1. Acute kidney injury, felt to be related to volume depletion and consists of diarrhea and poor p.o. intake. 2.  Chronic kidney disease, stage III. The patient with kidney  transplant. Baseline creatinine 1.5.  3. Immunosuppressed host, chronically on cyclosporin 100 mg b.i.d., CellCept 1000 mg b.i.d., and prednisone 5 mg. 4.  Sepsis. Need to rule out a C. diff. infection as the patient is reporting diarrhea. 5.  Obesity. 6.  Hypotension.     Azotemia drifting up as serum sodium also drifting up  With adjustments to free water flush delivery through her tube feed in the edition we will boluses, and 1/4 NS drip, serum sodium has come down to 143 mmol/L as of this morning.   Concerned that with continued holding of her free water flushes and tube feeds, sodium may drift back up again. Awaiting radiology to confirm new NG tube placement    RECOMMENDATIONS  IV fluids stopped we will continue to keep them stopped  Increased free water flushes from 30 cc to 120 cc every 4 hours yesterday, this in addition to the 400 cc she receives every 6 hours as an additional free water flush  Continue supportive care  Follow labs, next in a.m.     Electronically signed by Shaina Boateng MD on 1/23/2022

## 2022-01-24 NOTE — PROGRESS NOTES
Called to bedside due to reported drainage from surgical incision. Pt seen and examined. Supraumbilical incision with minimal serous drainage noted and able to be expressed. Skin was nearly closed. Likely draining seroma. Recommend application of pressure dressing and local wound care. Please call with any questions.     Discussed with Dr. Jean Berg    Electronically signed by Darya Herrera MD on 1/24/2022 at 3:04 PM

## 2022-01-24 NOTE — PROGRESS NOTES
Progress Note  Date:2022       Room:[facility]06-  Patient Miguel Angel Ma     YOB: 1955     Age:66 y.o. Subjective    Subjective:  Symptoms:  Worsening. (Pt unresponsive , ). Diet:  Dietary issues: unable to eat due to being unresponsive, on TF. Activity level: Activity impairment: unable to move        Review of Systems  Objective         Vitals Last 24 Hours:  TEMPERATURE:  Temp  Av.3 °F (37.9 °C)  Min: 98 °F (36.7 °C)  Max: 102.2 °F (39 °C)  RESPIRATIONS RANGE: Resp  Av.9  Min: 17  Max: 25  PULSE OXIMETRY RANGE: SpO2  Av.2 %  Min: 93 %  Max: 96 %  PULSE RANGE: Pulse  Av.6  Min: 90  Max: 109  BLOOD PRESSURE RANGE: Systolic (31WEN), BBV:571 , Min:90 , GDH:168   ; Diastolic (09PAS), EMU:05, Min:53, Max:66    I/O (24Hr): Intake/Output Summary (Last 24 hours) at 2022  Last data filed at 2022 1627  Gross per 24 hour   Intake 2096 ml   Output 1050 ml   Net 1046 ml     Objective:  General Appearance: In no acute distress (Unresponsive,on bipap , looks ill ). Vital signs: (most recent): Blood pressure 130/66, pulse 90, temperature 98 °F (36.7 °C), resp. rate 17, height 5' 2\" (1.575 m), weight 216 lb (98 kg), SpO2 96 %. Vital signs are normal.    Output: Producing urine. HEENT: Normal HEENT exam.    Lungs: There are decreased breath sounds. (Bronchial breathing bilateral )  Heart: Normal rate. Regular rhythm. Positive for murmur. Abdomen: Abdomen is soft and distended. (Obese ). Hypoactive bowel sounds. (Incisions from laparoscopy , glued and clean ). Extremities: (Edema +1-2   bilateral both LL  And UL)  Neurological: (Unresponsive ). Pupils:  Pupils are equal, round, and reactive to light.       Labs/Imaging/Diagnostics    Labs:  CBC:  Recent Labs     22  0515   WBC 16.9*   RBC 3.35*   HGB 10.7*   HCT 35.5   .0*   RDW 15.9*   PLT 82*     CHEMISTRIES:  Recent Labs     22  0540 22  0540 01/22/22  0600 01/22/22  0600 01/22/22  1500 01/22/22  2131 01/23/22  0515   *   < > 153*   < > 153* 148* 143   K 4.2   < > 4.2   < > 4.3 4.2 4.0   *   < > 114*   < > 115* 110* 108*   CO2 29   < > 30*   < > 29 27 24   BUN 55*   < > 55*   < > 58* 52* 52*   CREATININE 1.6*   < > 1.8*   < > 2.0* 1.9* 1.8*   GLUCOSE 153*   < > 144*   < > 171* 118* 113*   PHOS 2.0*  --  2.7  --   --   --  2.5   MG 2.2  --  2.1  --   --   --  1.9    < > = values in this interval not displayed. PT/INR:  Recent Labs     01/23/22  0515   PROTIME 13.0*   INR 1.1     APTT:No results for input(s): APTT in the last 72 hours. LIVER PROFILE:  Recent Labs     01/21/22  0540 01/22/22  0600 01/23/22  0515   AST 18 39* 171*   ALT 9 15 87*   BILITOT 0.4 0.4 0.5   ALKPHOS 67 64 104       Imaging Last 24 Hours:  CT ABDOMEN PELVIS WO CONTRAST Additional Contrast? None    Result Date: 1/10/2022  EXAMINATION: CT OF THE ABDOMEN AND PELVIS WITHOUT CONTRAST 1/10/2022 6:56 pm TECHNIQUE: CT of the abdomen and pelvis was performed without the administration of intravenous contrast. Multiplanar reformatted images are provided for review. Dose modulation, iterative reconstruction, and/or weight based adjustment of the mA/kV was utilized to reduce the radiation dose to as low as reasonably achievable. COMPARISON: CT abdomen pelvis 11/17/2021 HISTORY: ORDERING SYSTEM PROVIDED HISTORY: evaluate for diarrhea TECHNOLOGIST PROVIDED HISTORY: Reason for exam:->evaluate for diarrhea Additional Contrast?->None Decision Support Exception - unselect if not a suspected or confirmed emergency medical condition->Emergency Medical Condition (MA) FINDINGS: Lower Chest: Atherosclerotic disease. Atrophic kidneys with multiple cysts. Some of the presumed cysts appear hyperdense. Subcentimeter foci in the kidneys are too small to accurately characterize. There is a transplant kidney in the right lower quadrant. There is no hydronephrosis of the transplant kidney. Decreased perinephric stranding above the transplant kidney. Unremarkable bladder. Unremarkable appearance of the reproductive organs. No abdominal aortic aneurysm. Atherosclerotic disease. No evidence of cholecystitis or pancreatitis. No free air or significant free fluid. No evidence of bowel obstruction. Nondilated appendix. No significant fluid within the large bowel. Small fat containing umbilical hernia. Mild degenerative changes of the spine. Presumed surgical scars in the anterior abdominal wall. No significant fluid within the large bowel. No hydronephrosis of the transplant kidneys. The native kidneys are atrophic and have multiple intermediate density cysts which should be further evaluated with nonemergent ultrasound. Atherosclerotic disease. XR CHEST PORTABLE    Result Date: 1/11/2022  EXAMINATION: ONE XRAY VIEW OF THE CHEST 1/11/2022 9:11 am COMPARISON: None. HISTORY: ORDERING SYSTEM PROVIDED HISTORY: shortness of breath TECHNOLOGIST PROVIDED HISTORY: Reason for exam:->shortness of breath FINDINGS: The heart is enlarged. There are no findings of failure. The lungs are clear. There is no focal infiltrate or effusion. 1. Stable cardiomegaly. There is no evidence of failure or pneumonia. Assessment//Plan           Hospital Problems           Last Modified POA    * (Principal) Coma ([facility] 1/21/2022 Yes    CECILY (acute kidney injury) ([facility] 1/11/2022 Yes    Ischemic bowel disease ([facility] 1/15/2022 Yes    Altered mental status 1/21/2022 Yes        Assessment:    Condition: In serious condition. Unchanged.    (  Sepsis immunocompromised host ,    Cefepime d/c  and  Was  on zosyn , zyvox , and flagyl , ct scan abdomen , inflammation in the descending and sigmoid colon s/p laparoscopy by dr Brandy Mohr , no sign of ischemic bowel, now on daptomycin, Zosyn and Diflucan    Urine enterococcus fecalis on Zosyn and daptomycin,   Blood culture negative  ,  resp film array negative     Bilateral LL pneumonia  On zosyn , but still unresponsive, ABG metabolic and resp acidosis resolved, continue bipap , covid positive on 1/22/22, on decadron . Ct scan of the abdomen ? Enteritis , colonic distention no ischemic bowel ,pneumatosis , was taken by dr Deirdre Wilde 1/15  possible ischemic bowel  Laparoscopy , but was clear .,     Ileus due to sepsis , tolerating TF increase to 40 cc/ h     Hypernatremia increase  h20 flush per ng 400 cc q6 hours , , sodium better , was on D5 0.25,  Stop fluids , due to increase edema , and  SPA 25gm x 1 , to increase intravascular volume      ? Sepsis  Bilateral  pneumonia also on bipap , on zosyn ,  COVID 19 positive 1/22/22, was negative prior that , this admission, continue  Decadron,  Leukocytosis secondary to above ,      Acute on chronic renal failure , renal function slightly worse, creatinine 1.8, but better since admission was 2.5 ,    S/p renal transplant in the past ,     HTN bp optimal  On metorpolol, was held , since she had hypotension     Copd on aerosol treatment as needed     Bipolar , hold clozaril and prestiq , pt is unresponsive     Hypothyroidism continue synthroid,     ? Viral meningitis ,  Herpes simplex , acyclovir, now ,    2 d echo done today good EF 60-65 %      await transfer to [facility]     Talked to [place] her son yesterday and was updated , he showed interest in [facility] also , afterwards I contacted the access center [facility] and they checked and said , [facility] , and [facility] are not accepting any transfer requests . reached out again for the transfer and still no beds     Poor prognosis       ).        Electronically signed by Patricia Guthrie MD on 1/12/22 at 6:29 PM EST

## 2022-01-24 NOTE — PROGRESS NOTES
[facility] MD Rober King MD. Bailey Robbins MD   Progress Note    1/24/2022    SUBJECTIVE:   1/13:Seen in her room , lying flat on o2/nc , euvolemic , confused   D/w RN on floor   1/14: Patient was seen, interviewed and examined in her room, overall feeling much better with less confusion today. Discussed the case in detail with the nurse, patient had history of kidney transplant in 2015, has maintained of 2 medications including cyclosporine and MMF. We will check with lab to obtain values both medications random throughout toxicity levels that could be responsible for her confusion at one point or another. 1/15: Patient was seen in her room. Case discussed with her son was informing me that, she was scheduled for expiratory laparotomy for probable obstruction of her bowels as per general surgery. We will follow clinical status closely with surgery or any other intervention needed from nephrology. 1/16: Patient underwent yesterday laparoscopic lysis of adhesions with no other pathology being found as per general surgery, will keep following her renal status closely with BUN today is 36 and creatinine down to 1.5 overall stable  1/17 : sleeping on bipap when seeing her   No LE edema   Cr creeping up   General surgery feel there is no intraabdominal pathology   1/18: Patient sleepy but arousable, input of general surgery appreciated clarifying that there was no pneumatosis abdomen distention thought to be secondary to sepsis the patient is then for different reasons. Patient has history of congestive heart failure for which cardiology is following she is sleeping on BiPAP with no lower extremity edema. Patient is status post transplant on cyclosporine and whole blood was sent for assessing the level.     1/19 : seen  In her room continue to be lethargic comfused   On TF   BP stable   Good UO via hassan     1/20 : seen in her room confused , on BIPAP ,per RN no major change in mental status   Appear euvolemic no LE edema   On renal TF   Per Staff plan to transfer to [facility] .     1/22: Does not respond to verbal or tactile stimuli. Remains on AVAPS. Tube feeds at 30 cc an hour, free water flushes 30 cc every 4 hours. Remains hypernatremic, increased 0.2 NS to 150 cc an hour    1/23: Moved to the 6 floor. Apparently was awake, and interacting with her bedside RN earlier. At the time of my evaluation was asleep, somnolent, difficult to arouse. NG tube was partially dislodged, and so free water flushes and tube feed have been suspended for the past several hours. Hypotensive earlier today, received albumin bolus    1/24 : a lot more awake today , cr stable , clinically euvolemic     PROBLEM LIST:    Principal Problem:    Coma ([address]  Active Problems:    CECILY (acute kidney injury) ([address]    Ischemic bowel disease ([address]    Altered mental status  Resolved Problems:    * No resolved hospital problems.  *       DIET:    ADULT TUBE FEEDING; Nasogastric; Renal Formula; Continuous; 40; No; 30; Q 4 hours       Allergies : Macrodantin [nitrofurantoin macrocrystal] and Sulfa antibiotics    Past Medical History:   Diagnosis Date    Abnormal EKG     left bundle branch block    Acute gout involving toe of right foot 9/3/2020    Acute gout involving toe of right foot 9/3/2020    Acute on chronic combined systolic (congestive) and diastolic (congestive) heart failure (HCC)     Cancer (HCC)     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    Cerebrovascular disease     Clotting disorder ([address] 1985    thrombophlebitis after c section delivery    Depression     15 years followed by dr Valdemar Kennedy    Hyperlipidemia     Hypertension     Hyperthyroidism     Leg swelling 9/3/2020    Lymphedema of both lower extremities 9/3/2020    Peripheral vascular disease ([address]     Renal failure 11/2012    renal transplant    Thrombophlebitis     after c section delivery    Thyroid disease        Past Surgical History:   Procedure Laterality Date     SECTION  1985    one normal delivery    COLECTOMY N/A 1/15/2022    DIAGNOSTIC  LAPAROSCOPY LYSIS OF ADHESIONS performed by Paula Feng MD at [address] [place] [address] Drive CATH LAB PROCEDURE      normal coronaries and  normal coronaries    DIALYSIS FISTULA CREATION  march and 2012    phase one and two patient will start dialysis when needed   [address]    transfemoral venous bypass grafts    KIDNEY TRANSPLANT  2016    KIDNEY TRANSPLANT  2015    KIDNEY TRANSPLANT  2015    PARATHYROIDECTOMY  2006    left parathyroid  implant and parathyroidectomy       Family History   Problem Relation Age of Onset    Diabetes Mother     Diabetes Father     Dementia Father         reports that she quit smoking about 6 years ago. She has a 20.00 pack-year smoking history. She has never used smokeless tobacco. She reports that she does not drink alcohol and does not use drugs. Review of Systems:   Constitutional: no fevers , no chills , feels ok   Eyes: no eye pain , no itching , no drainage  Ears, nose, mouth, throat, and face: no ear ,nose pain , hearing is ok ,no nasal drainage   Respiratory: no sob ,no cough ,no wheezing . Cardiovascular: no chest pain , no palpitation ,no sob . Gastrointestinal: no nausea, vomiting , constipation , no abdominal pain . Genitourinary:no urinary retention , no burning , dysuria . No polyuria   Hematologic/lymphatic: no bleeding , no cougulation issues . Musculoskeletal:no joint pain , no swelling . Neurological: no headaches ,no weakness , no numbness . Endocrine: no thirst , no weight issues .      MEDS (scheduled):    meropenem  1,000 mg IntraVENous Q12H    daptomycin (CUBICIN) in NS IV syringe  6 mg/kg (Adjusted) IntraVENous Q24H    dexamethasone  6 mg Oral Daily    remdesivir IVPB  100 mg IntraVENous Q24H    lidocaine PF  5 mL IntraDERmal Once    sodium chloride flush  5-40 mL IntraVENous 2 times per day    heparin flush  3 mL IntraVENous 2 times per day    ipratropium-albuterol  3 mL Inhalation 4x daily    pantoprazole  40 mg IntraVENous BID    And    sodium chloride (PF)  10 mL IntraVENous BID    fluconazole  400 mg IntraVENous Q24H    metoprolol tartrate  50 mg Oral BID    docusate  100 mg Oral BID    heparin (porcine)  5,000 Units SubCUTAneous BID    allopurinol  100 mg Oral Daily    budesonide  500 mcg Nebulization BID    folic acid  1 mg Oral Daily    levothyroxine  50 mcg Oral Daily    atorvastatin  10 mg Oral Daily    ondansetron  4 mg Oral Once       MEDS (infusions):   sodium chloride Stopped (01/24/22 1547)    sodium chloride         MEDS (prn):  sodium chloride, sodium chloride flush, sodium chloride, heparin flush, perflutren lipid microspheres, ondansetron **OR** ondansetron, polyethylene glycol, acetaminophen **OR** acetaminophen    PHYSICAL EXAM:     Patient Vitals for the past 24 hrs:   BP Temp Temp src Pulse Resp SpO2   01/24/22 1317 -- -- -- -- -- 94 %   01/24/22 0945 -- -- -- -- -- 94 %   01/24/22 0902 -- -- -- -- -- 97 %   01/24/22 0900 (!) 122/50 97.4 °F (36.3 °C) Infrared 81 20 96 %   01/24/22 0630 -- -- -- -- -- 94 %   01/24/22 0612 -- -- -- -- -- 94 %   01/24/22 0030 (!) 118/56 97.5 °F (36.4 °C) Infrared 67 20 93 %   01/23/22 1945 118/71 97.7 °F (36.5 °C) Infrared 86 20 93 %   01/23/22 1830 130/66 98 °F (36.7 °C) -- 90 17 96 %   01/23/22 1615 105/61 99.5 °F (37.5 °C) Axillary 91 24 93 %   @      Intake/Output Summary (Last 24 hours) at 1/24/2022 1610  Last data filed at 1/24/2022 1444  Gross per 24 hour   Intake --   Output 1000 ml   Net -1000 ml         Wt Readings from Last 3 Encounters:   01/19/22 216 lb (98 kg)   11/17/21 227 lb 3.2 oz (103.1 kg)   08/16/21 229 lb (103.9 kg)       Constitutional:  in no acute distress, on AVAPS, mildly tachypneic  Oral: mucus membranes dry  Neck: no JVD, trachea midline  Cardiovascular: S1, S2 regular rhythm, no murmur,or rub  Respiratory: Prolonged prior Tory phase, few scattered coarse crackles   Gastrointestinal:  Soft, nontender, nondistended, NABS  Ext: Dependent edema, feet warm  Skin: dry, no rash  Neuro: Unresponsive to verbal or tactile stimuli. Spontaneously moves all 4 extremities. DATA:    Recent Labs     01/23/22  0515   WBC 16.9*   HGB 10.7*   HCT 35.5   .0*   PLT 82*     Recent Labs     01/22/22  0600 01/22/22  1500 01/22/22  2131 01/23/22  0515 01/24/22  0535   *   < > 148* 143 144   K 4.2   < > 4.2 4.0 3.9   *   < > 110* 108* 109*   CO2 30*   < > 27 24 26   MG 2.1  --   --  1.9 2.1   PHOS 2.7  --   --  2.5 3.6   BUN 55*   < > 52* 52* 57*   CREATININE 1.8*   < > 1.9* 1.8* 1.9*   ALT 15  --   --  87* 90*   AST 39*  --   --  171* 119*   BILITOT 0.4  --   --  0.5 0.3   ALKPHOS 64  --   --  104 103    < > = values in this interval not displayed. ASSESSMENT     1. Acute kidney injury, felt to be related to volume depletion and consists of diarrhea and poor p.o. intake. 2.  Chronic kidney disease, stage III. The patient with kidney  transplant. Baseline creatinine 1.5.  3. Immunosuppressed host, chronically on cyclosporin 100 mg b.i.d., CellCept 1000 mg b.i.d., and prednisone 5 mg. 4.  Sepsis. Need to rule out a C. diff. infection as the patient is reporting diarrhea. 5.  Obesity.   6.  Hypotension.       RECOMMENDATIONS    Clinically better  Cr relatively stable   Hyper nateremia resolving with FWF   Continue current TF with FWF   Bmp in am     Electronically signed by Marianne Morse MD on 1/24/2022

## 2022-01-24 NOTE — PROGRESS NOTES
transfer center called 6th floor for update on pt. Still no bed available.    Pt had a kidney transplant at [facility]

## 2022-01-24 NOTE — CARE COORDINATION
SOCIAL WORK / DISCHARGE PLANNING:  COVID positive 1/21. Sw consult noted for rehab placement. Pt was transferred from 5th floor. Pt has been awaiting bed at [facility] for transfer accepted by Dr Faith Soriano, due to hx kidney transplant 2015. No beds available today per access center. Pt currently on IV Dapto, this would be an obstacle to [facility] placement. IV Zosyn changed to IV Merrem. IV Remdesvir had been stopped due to LFTs, restarted today. Pt continues with ng tube with tube feed again another obstacle for discharge to [facility]. LTACs had been following, spoke with [facility] rep would not able to accept until pt was atleast 10 days post COVID + with no resp symptoms. [facility] has been following and may have the same issue. [facility] had been following new COVID dx would also be an issue as well as IV Dapto. Sw will follow, for further dc planning development.                  Electronically signed by AG Ace on 1/24/2022 at 4:22 PM

## 2022-01-25 VITALS
TEMPERATURE: 97.9 F | BODY MASS INDEX: 39.75 KG/M2 | OXYGEN SATURATION: 94 % | DIASTOLIC BLOOD PRESSURE: 78 MMHG | WEIGHT: 216 LBS | HEIGHT: 62 IN | SYSTOLIC BLOOD PRESSURE: 128 MMHG | HEART RATE: 70 BPM | RESPIRATION RATE: 18 BRPM

## 2022-01-25 PROCEDURE — 6360000002 HC RX W HCPCS: Performed by: HOSPITALIST

## 2022-01-25 PROCEDURE — 6360000002 HC RX W HCPCS: Performed by: SURGERY

## 2022-01-25 PROCEDURE — 2580000003 HC RX 258: Performed by: INTERNAL MEDICINE

## 2022-01-25 PROCEDURE — 2580000003 HC RX 258: Performed by: CLINICAL NURSE SPECIALIST

## 2022-01-25 PROCEDURE — 94660 CPAP INITIATION&MGMT: CPT

## 2022-01-25 PROCEDURE — 6370000000 HC RX 637 (ALT 250 FOR IP): Performed by: INTERNAL MEDICINE

## 2022-01-25 PROCEDURE — 94640 AIRWAY INHALATION TREATMENT: CPT

## 2022-01-25 PROCEDURE — 6370000000 HC RX 637 (ALT 250 FOR IP): Performed by: SURGERY

## 2022-01-25 PROCEDURE — C9113 INJ PANTOPRAZOLE SODIUM, VIA: HCPCS | Performed by: HOSPITALIST

## 2022-01-25 PROCEDURE — 2700000000 HC OXYGEN THERAPY PER DAY

## 2022-01-25 PROCEDURE — 6360000002 HC RX W HCPCS: Performed by: INTERNAL MEDICINE

## 2022-01-25 PROCEDURE — 2580000003 HC RX 258: Performed by: HOSPITALIST

## 2022-01-25 PROCEDURE — A4216 STERILE WATER/SALINE, 10 ML: HCPCS | Performed by: HOSPITALIST

## 2022-01-25 PROCEDURE — 6360000002 HC RX W HCPCS: Performed by: CLINICAL NURSE SPECIALIST

## 2022-01-25 RX ADMIN — ATORVASTATIN CALCIUM 10 MG: 10 TABLET, FILM COATED ORAL at 09:02

## 2022-01-25 RX ADMIN — FOLIC ACID 1 MG: 1 TABLET ORAL at 09:02

## 2022-01-25 RX ADMIN — BUDESONIDE 500 MCG: 0.5 INHALANT RESPIRATORY (INHALATION) at 07:26

## 2022-01-25 RX ADMIN — IPRATROPIUM BROMIDE AND ALBUTEROL SULFATE 3 ML: .5; 3 SOLUTION RESPIRATORY (INHALATION) at 07:25

## 2022-01-25 RX ADMIN — DEXAMETHASONE 6 MG: 6 TABLET ORAL at 09:02

## 2022-01-25 RX ADMIN — PANTOPRAZOLE SODIUM 40 MG: 40 INJECTION, POWDER, FOR SOLUTION INTRAVENOUS at 09:10

## 2022-01-25 RX ADMIN — HEPARIN SODIUM 5000 UNITS: 5000 INJECTION INTRAVENOUS; SUBCUTANEOUS at 09:02

## 2022-01-25 RX ADMIN — Medication 10 ML: at 09:10

## 2022-01-25 RX ADMIN — METOPROLOL TARTRATE 50 MG: 50 TABLET, FILM COATED ORAL at 09:02

## 2022-01-25 RX ADMIN — MEROPENEM 1000 MG: 1 INJECTION INTRAVENOUS at 02:20

## 2022-01-25 RX ADMIN — IPRATROPIUM BROMIDE AND ALBUTEROL SULFATE 3 ML: .5; 3 SOLUTION RESPIRATORY (INHALATION) at 12:27

## 2022-01-25 RX ADMIN — HEPARIN 300 UNITS: 100 SYRINGE at 09:09

## 2022-01-25 RX ADMIN — LEVOTHYROXINE SODIUM 50 MCG: 0.05 TABLET ORAL at 09:02

## 2022-01-25 RX ADMIN — SODIUM CHLORIDE, PRESERVATIVE FREE 10 ML: 5 INJECTION INTRAVENOUS at 09:10

## 2022-01-25 RX ADMIN — ALLOPURINOL 100 MG: 100 TABLET ORAL at 09:02

## 2022-01-25 ASSESSMENT — PAIN SCALES - WONG BAKER
WONGBAKER_NUMERICALRESPONSE: 0
WONGBAKER_NUMERICALRESPONSE: 0

## 2022-01-25 ASSESSMENT — PAIN SCALES - GENERAL
PAINLEVEL_OUTOF10: 0
PAINLEVEL_OUTOF10: 0

## 2022-01-25 NOTE — PROGRESS NOTES
Pulmonary/Critical Care Progress Note    We are following patient for acute respiratory failure with hypoxia and hypercapnia, hypoventilation, acute mental status change, pneumatosis of the colon, colitis, UTI, history of kidney transplant, home immunosuppression agent, acute on chronic renal failure, hypothyroidism, history of bipolar disorder, COPD. In Summary:  Merlyn Lombardo is a 73 y.o. female with hx of Kidney TX, , CKD, Bipolar, morbid obesity who presents with pneumatosis of colon, altered mental status         ISSUES:        Acute Hypercapneic Respiratory Failure   COPD  Morbid Obesity  ? Baseline obesity hypoventialtion  On nasal cannula    ABG with excellent compensation / ventilation and normal pH    Recent Labs     01/21/22  1723   PH 7.409   PCO2 48.9   O2SAT 91.3*          AMS  Dramatically improved      COVID positive  New finding  Decadron / Remdesivir       Pneumatosis of colon, no evidence of bowel obstruction  Colitis  Bowel adhesion  Continue IV fluid. NG tube to low wall intermittent suction. Being followed by GI / Surgery      UTI  History of kidney transplant, on immunosuppressant agents  Acute on chronic renal failure  cefepime/Zyvox/Diflucan/Flagyl per ID. Hypertension  Bipolar disorder  Hypothyroidism. History of     Call with questions      ____________________________________      SUBJECTIVE:  Patient seen and examined in room.     Talking, much improved     MEDICATIONS:   meropenem  1,000 mg IntraVENous Q12H    daptomycin (CUBICIN) in NS IV syringe  6 mg/kg (Adjusted) IntraVENous Q24H    dexamethasone  6 mg Oral Daily    remdesivir IVPB  100 mg IntraVENous Q24H    lidocaine PF  5 mL IntraDERmal Once    sodium chloride flush  5-40 mL IntraVENous 2 times per day    heparin flush  3 mL IntraVENous 2 times per day    ipratropium-albuterol  3 mL Inhalation 4x daily    pantoprazole  40 mg IntraVENous BID    And    sodium chloride (PF)  10 mL IntraVENous BID    fluconazole  400 mg IntraVENous Q24H    metoprolol tartrate  50 mg Oral BID    docusate  100 mg Oral BID    heparin (porcine)  5,000 Units SubCUTAneous BID    allopurinol  100 mg Oral Daily    budesonide  500 mcg Nebulization BID    folic acid  1 mg Oral Daily    levothyroxine  50 mcg Oral Daily    atorvastatin  10 mg Oral Daily    ondansetron  4 mg Oral Once      sodium chloride Stopped (01/24/22 1547)    sodium chloride       sodium chloride, sodium chloride flush, sodium chloride, heparin flush, perflutren lipid microspheres, ondansetron **OR** ondansetron, polyethylene glycol, acetaminophen **OR** acetaminophen      REVIEW OF SYSTEMS:  Unable to obtain due to patient mental status. OBJECTIVE:  Vitals:    01/24/22 1815   BP: 136/75   Pulse: 88   Resp: 22   Temp: 98 °F (36.7 °C)   SpO2: 94%     FiO2 : 35 %  O2 Flow Rate (L/min): 3 L/min  O2 Device: Nasal cannula    PHYSICAL EXAM:  Cardiovascular: Normal S1 and S2, no murmur. Respiratory: improved  breathing sound bilaterally. Gastrointestinal: Distended, hypoactive bowel sounds.   Genitourinary: Unremarkable  Extremities: 1+ pitting edema on both lower extremity  Neurological: alert     LABS:  WBC   Date Value Ref Range Status   01/23/2022 16.9 (H) 4.5 - 11.5 E9/L Final   01/20/2022 18.5 (H) 4.5 - 11.5 E9/L Final   01/19/2022 21.4 (H) 4.5 - 11.5 E9/L Final     Hemoglobin   Date Value Ref Range Status   01/23/2022 10.7 (L) 11.5 - 15.5 g/dL Final   01/20/2022 10.5 (L) 11.5 - 15.5 g/dL Final   01/19/2022 11.6 11.5 - 15.5 g/dL Final     Hematocrit   Date Value Ref Range Status   01/23/2022 35.5 34.0 - 48.0 % Final   01/20/2022 36.1 34.0 - 48.0 % Final   01/19/2022 37.3 34.0 - 48.0 % Final     MCV   Date Value Ref Range Status   01/23/2022 106.0 (H) 80.0 - 99.9 fL Final   01/20/2022 106.2 (H) 80.0 - 99.9 fL Final   01/19/2022 104.8 (H) 80.0 - 99.9 fL Final     Platelets   Date Value Ref Range Status   01/23/2022 82 (L) 130 - 450 E9/L Final   01/20/2022 113 (L) 130 - 450 E9/L Final   01/19/2022 96 (L) 130 - 450 E9/L Final     Sodium   Date Value Ref Range Status   01/24/2022 144 132 - 146 mmol/L Final   01/23/2022 143 132 - 146 mmol/L Final   01/22/2022 148 (H) 132 - 146 mmol/L Final     Potassium   Date Value Ref Range Status   01/24/2022 3.9 3.5 - 5.0 mmol/L Final   01/23/2022 4.0 3.5 - 5.0 mmol/L Final   01/22/2022 4.2 3.5 - 5.0 mmol/L Final     Potassium reflex Magnesium   Date Value Ref Range Status   01/10/2022 5.5 (H) 3.5 - 5.0 mmol/L Final     Comment:     Specimen is moderately Hemolyzed. Result may be artificially increased. 12/17/2019 5.2 (H) 3.5 - 5.0 mmol/L Final     Comment:     Specimen is moderately Hemolyzed. Result may be artificially increased.      Chloride   Date Value Ref Range Status   01/24/2022 109 (H) 98 - 107 mmol/L Final   01/23/2022 108 (H) 98 - 107 mmol/L Final   01/22/2022 110 (H) 98 - 107 mmol/L Final     CO2   Date Value Ref Range Status   01/24/2022 26 22 - 29 mmol/L Final   01/23/2022 24 22 - 29 mmol/L Final   01/22/2022 27 22 - 29 mmol/L Final     BUN   Date Value Ref Range Status   01/24/2022 57 (H) 6 - 23 mg/dL Final   01/23/2022 52 (H) 6 - 23 mg/dL Final   01/22/2022 52 (H) 6 - 23 mg/dL Final     CREATININE   Date Value Ref Range Status   01/24/2022 1.9 (H) 0.5 - 1.0 mg/dL Final   01/23/2022 1.8 (H) 0.5 - 1.0 mg/dL Final   01/22/2022 1.9 (H) 0.5 - 1.0 mg/dL Final     Glucose   Date Value Ref Range Status   01/24/2022 145 (H) 74 - 99 mg/dL Final   01/23/2022 113 (H) 74 - 99 mg/dL Final   01/22/2022 118 (H) 74 - 99 mg/dL Final     Calcium   Date Value Ref Range Status   01/24/2022 9.2 8.6 - 10.2 mg/dL Final   01/23/2022 9.1 8.6 - 10.2 mg/dL Final   01/22/2022 9.2 8.6 - 10.2 mg/dL Final     Total Protein   Date Value Ref Range Status   01/24/2022 4.7 (L) 6.4 - 8.3 g/dL Final   01/23/2022 4.6 (L) 6.4 - 8.3 g/dL Final   01/22/2022 5.1 (L) 6.4 - 8.3 g/dL Final     Albumin   Date Value Ref Range Status   01/24/2022 2.3 (L) 3.5 - 5.2 g/dL Final   01/23/2022 2.1 (L) 3.5 - 5.2 g/dL Final   01/22/2022 2.3 (L) 3.5 - 5.2 g/dL Final     Total Bilirubin   Date Value Ref Range Status   01/24/2022 0.3 0.0 - 1.2 mg/dL Final   01/23/2022 0.5 0.0 - 1.2 mg/dL Final   01/22/2022 0.4 0.0 - 1.2 mg/dL Final     Alkaline Phosphatase   Date Value Ref Range Status   01/24/2022 103 35 - 104 U/L Final   01/23/2022 104 35 - 104 U/L Final   01/22/2022 64 35 - 104 U/L Final     AST   Date Value Ref Range Status   01/24/2022 119 (H) 0 - 31 U/L Final     Comment:     Specimen is slightly Hemolyzed. Result may be artificially increased. 01/23/2022 171 (H) 0 - 31 U/L Final     Comment:     Specimen is slightly Hemolyzed. Result may be artificially increased. 01/22/2022 39 (H) 0 - 31 U/L Final     ALT   Date Value Ref Range Status   01/24/2022 90 (H) 0 - 32 U/L Final   01/23/2022 87 (H) 0 - 32 U/L Final   01/22/2022 15 0 - 32 U/L Final     GFR Non-   Date Value Ref Range Status   01/24/2022 26 >=60 mL/min/1.73 Final     Comment:     Chronic Kidney Disease: less than 60 ml/min/1.73 sq.m. Kidney Failure: less than 15 ml/min/1.73 sq.m. Results valid for patients 18 years and older. 01/23/2022 28 >=60 mL/min/1.73 Final     Comment:     Chronic Kidney Disease: less than 60 ml/min/1.73 sq.m. Kidney Failure: less than 15 ml/min/1.73 sq.m. Results valid for patients 18 years and older. 01/22/2022 26 >=60 mL/min/1.73 Final     Comment:     Chronic Kidney Disease: less than 60 ml/min/1.73 sq.m. Kidney Failure: less than 15 ml/min/1.73 sq.m. Results valid for patients 18 years and older.        GFR    Date Value Ref Range Status   01/24/2022 32  Final   01/23/2022 34  Final   01/22/2022 32  Final     Magnesium   Date Value Ref Range Status   01/24/2022 2.1 1.6 - 2.6 mg/dL Final   01/23/2022 1.9 1.6 - 2.6 mg/dL Final   01/22/2022 2.1 1.6 - 2.6 mg/dL Final     Phosphorus   Date Value Ref Range Status   01/24/2022 3.6 2.5 - 4.5 mg/dL Final   01/23/2022 2.5 2.5 - 4.5 mg/dL Final   01/22/2022 2.7 2.5 - 4.5 mg/dL Final     No results for input(s): PH, PO2, PCO2, HCO3, BE, O2SAT in the last 72 hours. RADIOLOGY:  XR ABDOMEN FOR NG/OG/NE TUBE PLACEMENT   Final Result   Satisfactory position of the enteric tube. XR CHEST PORTABLE   Final Result   1. Essentially stable chest series. Persistent retrocardiac and left   greater than right basilar opacities. Atherosclerotic disease and   cardiomegaly. 2.  Right upper extremity PICC line is either new or replaced previous   catheter which previously was seen over the right axilla. Enteric tube as   above. CT ABDOMEN PELVIS WO CONTRAST Additional Contrast? None   Final Result   1. Colitis involving the left colon from splenic flexure to mid to distal   sigmoid colon. 2. Atrophy of native kidneys. Multiple renal cysts and nonobstructing left   renal calculus. Transplant kidney in right iliac fossa. 3. Trace ascites superficial to the liver. 4. Unchanged consolidation in lower lobes. 5. Unchanged cardiomegaly. 6. Possible gallbladder sludge and or cholelithiasis. RECOMMENDATIONS:   Unavailable         XR ABDOMEN (KUB) (SINGLE AP VIEW)   Final Result   No evidence of bowel obstruction. CT CHEST WO CONTRAST   Final Result   1. Bilateral lower lobe alveolar opacities compatible with pneumonia. Increased consolidation in the right lower lobe compared to the prior study. 2.  Trace effusions and trace pericardial effusion with left ventricular   dilatation. 3.  No evidence to suggest empyema. 4.  Gastric catheter passes into the stomach. XR CHEST PORTABLE   Final Result   Infiltrate and or atelectasis left greater than right with mildly improving   aeration in the left mid lung. MRI BRAIN WO CONTRAST   Final Result   No acute intracranial abnormality.          XR ABDOMEN FOR NG/OG/NE TUBE PLACEMENT   Final Result NG tube in good position. XR CHEST PORTABLE   Final Result   Cardiomegaly. Findings for an infiltrate with some consolidation in the mid lower aspect of   the left lung. CT ABDOMEN PELVIS WO CONTRAST Additional Contrast? Oral   Final Result   There is pneumatosis involving the ascending to transverse colon. There is   wall thickening of the splenic flexure to descending colon. This is   nonspecific but can be seen related to ischemic enteritis but there is no   evidence for portal venous gas,, medication induced pneumatosis in a patient   taking steroids or chemotherapy, procedure related. Clinical correlation   recommended. Surgical consultation may be needed. There is fairly dense bibasilar infiltrates which have progressed from prior   exam.  Correlation with fever and white count and follow-up to resolution   recommended. Multiple mixed density cysts both kidneys related to chronic renal failure   with the transplant right lower quadrant. There is distention of small bowel in the lower pelvis without obstructing   lesion identified. Small hiatal hernia. XR ABDOMEN (KUB) (SINGLE AP VIEW)   Final Result   Abundant gas and stool throughout the colon may represent constipation. XR CHEST PORTABLE   Final Result   1. Stable cardiomegaly. There is no evidence of failure or pneumonia. CT ABDOMEN PELVIS WO CONTRAST Additional Contrast? None   Final Result   No significant fluid within the large bowel. No hydronephrosis of the transplant kidneys. The native kidneys are atrophic   and have multiple intermediate density cysts which should be further   evaluated with nonemergent ultrasound. Atherosclerotic disease.          IR MIDLINE CATH    (Results Pending)           Electronically signed by Sumit Gordon MD on 1/24/2022 at 9:19 PM

## 2022-01-25 NOTE — PROCEDURES
[address]                          ELECTROENCEPHALOGRAM REPORT    PATIENT NAME: Cierra Sanchez              :        1955  MED REC NO:   [id number]                            ROOM:       3677  ACCOUNT NO:   [de-identified]                           ADMIT DATE: 01/10/2022  PROVIDER:     Reid Seth MD    DATE OF EE2022    EEG DIAGNOSIS:  Abnormal report. A 19-channel EEG was recorded and demonstrates a background rhythm of  3-5 Hz independent of eye closure. Hyperventilation and photic  stimulation were not performed. During the sleep portion of the record,  there was no activation. INTERPRETATION:  Abnormal EEG secondary to theta range slowing. This  pattern of EEG abnormality can be seen secondary to toxic, metabolic, or  hypoxic ischemic encephalopathy. No epileptiform activity noted, which  does not rule out underlying seizure disorder. Clinical correlation  advised.         Subhash Culp MD    D: 2022 11:19:42       T: 2022 11:22:07     [id number]  Job#: [id number]     Doc#: [id number]    CC:

## 2022-01-25 NOTE — PLAN OF CARE
Pt is on HD + COVID. Being transferred to [address] [place] [address]. Patient's chart updated to reflect:      . - HF care plan, HF education points and HF discharge instructions.  -Orders: TF-- per Dr. Varinder Bennett, daily weights, I/O.  -PCP and cardiology follow up appointments to be scheduled within 7 days of hospital discharge. -CHF education session will be provided to the patient prior to hospital discharge.     Karissa Ibarra RN   Heart Failure Navigator

## 2022-01-25 NOTE — CARE COORDINATION
SOCIAL WORK / DISCHARGE PLANNING:  COVID positive 1/21. Pt transferring to [facility] today, transport arranged 12pm via [facility] by midnight RN. Ambulance form completed. Margo updated [facility] both who have been following. Margo spoke with sonClemencia via phone, he was inquiring about completing POA. Margo explained pt must be alert and oriented, aware of what she is signing for this to be completed. Currently pt would not be able to complete POA but did recommend follow up with case management/  at [facility] as she improves. Margo did update sonClemencia of dc planning that had been discussed such as LTAC but required 10 days from positive diagnosis.                Electronically signed by AG Roy on 1/25/2022 at 9:51 AM

## 2022-01-25 NOTE — PROGRESS NOTES
Progress Note  Date:2022       Room:0640/0640-01  Patient Mark Anthony Shafer     YOB: 1955     Age:66 y.o. Subjective    Subjective:  Symptoms:  Improved. (Pt is alert today , and talkative , slightly confused , looks much better ). Diet:  Dietary issues:  on TF still     Activity level: Activity impairment: very weak , but able to move all extremeties but minimally not against gravity     Pain:  She reports no pain. Review of Systems  Objective         Vitals Last 24 Hours:  TEMPERATURE:  Temp  Av.7 °F (36.5 °C)  Min: 97.4 °F (36.3 °C)  Max: 98 °F (36.7 °C)  RESPIRATIONS RANGE: Resp  Av.5  Min: 20  Max: 22  PULSE OXIMETRY RANGE: SpO2  Av.5 %  Min: 93 %  Max: 97 %  PULSE RANGE: Pulse  Av.5  Min: 67  Max: 88  BLOOD PRESSURE RANGE: Systolic (06IKP), EWD:334 , Min:118 , BBA:695   ; Diastolic (80SSQ), MSX:58, Min:50, Max:75    I/O (24Hr): Intake/Output Summary (Last 24 hours) at 2022 1939  Last data filed at 2022 1444  Gross per 24 hour   Intake --   Output 900 ml   Net -900 ml     Objective:  General Appearance: In no acute distress and comfortable (on NC). Vital signs: (most recent): Blood pressure 136/75, pulse 88, temperature 98 °F (36.7 °C), resp. rate 22, height 5' 2\" (1.575 m), weight 216 lb (98 kg), SpO2 94 %. Vital signs are normal.    Output: Producing urine. HEENT: Normal HEENT exam.    Lungs: There are decreased breath sounds. Heart: Normal rate. Regular rhythm. Positive for murmur. Abdomen: Abdomen is soft and distended. (Obese ). Hypoactive bowel sounds. (Incisions from laparoscopy , glued and clean ). Extremities: (Edema +2   bilateral both LL  And UL)  Neurological: Patient is alert. (Oriented to place and self). Pupils:  Pupils are equal, round, and reactive to light.       Labs/Imaging/Diagnostics    Labs:  CBC:  Recent Labs     22  0515   WBC 16.9*   RBC 3.35*   HGB 10.7*   HCT 35.5   MCV stranding above the transplant kidney. Unremarkable bladder. Unremarkable appearance of the reproductive organs. No abdominal aortic aneurysm. Atherosclerotic disease. No evidence of cholecystitis or pancreatitis. No free air or significant free fluid. No evidence of bowel obstruction. Nondilated appendix. No significant fluid within the large bowel. Small fat containing umbilical hernia. Mild degenerative changes of the spine. Presumed surgical scars in the anterior abdominal wall. No significant fluid within the large bowel. No hydronephrosis of the transplant kidneys. The native kidneys are atrophic and have multiple intermediate density cysts which should be further evaluated with nonemergent ultrasound. Atherosclerotic disease. XR CHEST PORTABLE    Result Date: 1/11/2022  EXAMINATION: ONE XRAY VIEW OF THE CHEST 1/11/2022 9:11 am COMPARISON: None. HISTORY: ORDERING SYSTEM PROVIDED HISTORY: shortness of breath TECHNOLOGIST PROVIDED HISTORY: Reason for exam:->shortness of breath FINDINGS: The heart is enlarged. There are no findings of failure. The lungs are clear. There is no focal infiltrate or effusion. 1. Stable cardiomegaly. There is no evidence of failure or pneumonia. Assessment//Plan           Hospital Problems           Last Modified POA    * (Principal) Coma ([facility] [address] 1/21/2022 Yes    CECILY (acute kidney injury) ([facility] [address] 1/11/2022 Yes    Ischemic bowel disease ([facility] [address] 1/15/2022 Yes    Altered mental status 1/21/2022 Yes        Assessment:    Condition: In serious condition. Unchanged.    (  Sepsis immunocompromised host ,    Cefepime d/c  and  Was  on zosyn , zyvox , and flagyl , ct scan abdomen , inflammation in the descending and sigmoid colon s/p laparoscopy by dr Orbie Gilford , no sign of ischemic bowel, now on daptomycin, meropenem and Diflucan    Urine enterococcus fecalis on meropenem ,daptomycin,     Blood culture negative  ,  resp film array negative     Bilateral LL pneumonia  On

## 2022-01-25 NOTE — PROGRESS NOTES
This nurse spoke to [facility] regarding transfer to [facility] MICU bed 7. Pickup time estimated for noon today 1/25/22.

## 2022-01-26 LAB
(1,3)-BETA-D-GLUCAN (FUNGITELL) INTERPRETATION: NEGATIVE
(1,3)-BETA-D-GLUCAN (FUNGITELL): <31 PG/ML
BLOOD CULTURE, ROUTINE: NORMAL
CULTURE, BLOOD 2: NORMAL
URINE CULTURE, ROUTINE: NORMAL

## 2022-01-26 NOTE — DISCHARGE SUMMARY
[address]                               DISCHARGE SUMMARY    PATIENT NAME: Delvis Taylor              :        1955  MED REC NO:   [id number]                            ROOM:       1939  ACCOUNT NO:   [de-identified]                           ADMIT DATE: 01/10/2022  PROVIDER:     Vivi Watson MD                  DISCHARGE DATE:  2022      DISCHARGE DIAGNOSES:  1. Sepsis secondary to urinary tract infection. 2.  Bilateral pneumonia secondary to COVID, superimposed with bacterial  infection. 3.  Unresponsiveness. 4.  Acute on chronic renal failure. 5.  Hyperextension. 6.  Hyperlipidemia. 7.  Hypothyroidism. 8.  History of renal transplant in [facility] in [place], [place].  9.  History of parathyroidism. 10.  Bipolar disorder. 11.  Respiratory and metabolic acidosis, resolved. 12.  Dehydration. 13.  COPD. HOSPITAL COURSE:  This is a 78-year-old white lady with significant past  medical history as stated above, who presented to the emergency room  after coming to my office for followup. She was originally admitted in  2021 for urinary tract infection and possible viral meningitis. She  was treated at that time and she had a lumbar puncture at that time,  which showed multiple monocytes and she was treated with acyclovir. She  was sent to rehab facility and she was discharged from the rehab  facility on . She was at home, and she was doing fairly well; and  she started for the last week or 10 days prior to the admission not  feeling well, and getting weaker and weaker, and has loss of appetite. Was not eating and drinking well, and she also had some diarrhea for two  or three days prior to admission. When she was seen in the office, she  was very lethargic and weak, and looked ill. She was advised to go to  the emergency room and be admitted.   She then was admitted. Her  creatinine was up to 2.5 from baseline of 1.2 to 1.5, and also she had  leukocytosis with a white cell count of 07834. She did have a CAT scan  of the abdomen, which did not reveal acute abnormality in the beginning. Infectious Disease was consulted. She was placed on Zosyn. Her urine  eventually showed an Enterococcus, and she was placed on Zyvox. She  started getting more lethargic, and in a couple of days she was looking  even sicker, and was almost unresponsive. Repeat CAT scan of her  abdomen was performed, which revealed colitis of the left colon from the  splenic flexure to the mid and distal sigmoid. She was placed on Flagyl  and Surgery was consulted. It was thought that she might also have  ischemic bowel since her condition was worsening, and her belly was  getting more and more distended. She was taken by Dr. Tony Henderson to the OR  for exploratory laparoscopy, which did not reveal any ischemic bowel and  there were no abnormalities in the intestine. The patient was still not  doing well. She started to be totally unresponsive, but ABGs were done  which showed some CO2 narcosis with the CO2 in the 50s and the pH of  7.2. She was placed on BiPAP, and Pulmonology and intensivist were  consulted. She was kept on BiPAP for a while. Eventually her pH was  normal and her CO2 went back to normal.  A CT scan of the chest was  performed, which also showed bilateral lower lobe opacities with  pneumonia. Originally on admission, the patient tested negative for  COVID. Another test was performed and she tested positive on 01/23. She was placed in isolation. At that time her oxygenation was actually  getting better. She was placed on Decadron. No active treatment for  COVID, and her cyclosporin and CellCept were discontinued due to her  immunocompromised status and sepsis, and she had other repeat blood  cultures on 01/23 due to spiking of her fever, which was also further  negative. The patient did not have an actual reason for being  unresponsive for so many days. She did have an MRI of her brain due to  being unresponsive and Neurology was consulted. The MRI of the brain  did not have acute abnormality. At that time I discussed in detail with  her son, Kalpana Owens, almost on daily basis and we decided to transfer her to  [facility].  I talked to the team in [facility] and  Dr. Mahogany Charles, and they accepted the patient, but unfortunately they do  not have any beds, and eventually after almost seven to eight days they  were able to have a bed for her, and she was transferred there. On the  day prior to the transfer, she was actually doing much better. She woke  up for the first time in almost 10 days, and she was able to come to  speak, and she was confused, but she was oriented to place and person,  and she had physical therapy and speech eval.  Speech recommended pureed  and liquid diet, but we still continued her on the tube feeding because  of she is not totally awake. The patient was started on acyclovir IV  empirically by Infectious Disease and myself after discussion since she  was unconscious and unresponsive for the last _____ herpes simplex viral  antibodies, the IgG was positive and IgM was negative. So it was  discontinued and she also had the monthly beta-D-glucan positive and she  was placed on Diflucan by Infectious Disease. She was also originally  on Zyvox and Zosyn for Enterococcus in the urine and for her pneumonia. Zyvox was discontinued eventually due to thrombocytopenia, and she was  placed on Zyvox and daptomycin. She did develop during her hospitalization, hypernatremia for which she  was placed on free fluid and D5 quarter IV; and that was normalized  appropriately. The patient was then discharged to [facility]  for continuing her medical care and further evaluation.     DISCHARGE MEDICATIONS:  She was on daptomycin 400 mg IV daily, Decadron  6 mg daily, Zosyn 3.375 q.12 h., thiamine 100 mg daily, metoprolol 50 mg  twice a day, Diflucan 400 mg daily. The patient was also on tube feeding and was tolerating well. She did  not have any diarrhea during her hospitalization. The patient is to  follow up with myself after the discharge from [facility] and  rehab, and will need a lot of rehab since she was very weak.         Claudean Lindsay, MD    D: 01/25/2022 23:03:16       T: 01/25/2022 23:11:55     [email or web address]  Job#: [id number]     Doc#: [id number]    CC:

## 2022-01-27 LAB — WOUND/ABSCESS: NORMAL

## 2022-01-29 LAB
BLOOD CULTURE, ROUTINE: NORMAL
CULTURE, BLOOD 2: NORMAL

## 2022-02-14 NOTE — PROGRESS NOTES
Physician Progress Note      PATIENT:               Radha Garzon  CSN #:                  [id number]  :                       1955  ADMIT DATE:       1/10/2022 6:12 PM  [address] [place] DATE:        2022 2:30 PM  RESPONDING  PROVIDER #:        Lorri Peralta MD          QUERY TEXT:    Dear Dr. Preston Morris,    Pt admitted with sepsis/Enterococcus UTI . Pt noted to have history of kidney   transplant. If possible, please document in progress notes and discharge   summary the relationship, if any, between enterococcus UTI with sepsis and   kidney transplant. The medical record reflects the following:  Risk Factors: IMMUNOCOMPROMISED PT RENAL TRANSPLANT  Clinical Indicators: CT abdomen was negative for acute process. ; urine c/s:     Enterococcus faecalis >100,000 CFU/ml  Treatment: IMCU monitoring, GI, Sx, Neuro and ID consults, Cellcept, IV ABX,   blood cultures, urine c/s    Thank You,  Anjali Griffith RN, BSN, CDS  Clinical Documentation Improvement Specialist  [phone]  Options provided:  -- Enterococcus UTI with sepsis due to transplanted kidney (kidney transplant   infection)  -- Enterococcus UTI with sepsis unrelated to transplanted kidney (kidney   transplant infection)  -- Other - I will add my own diagnosis  -- Disagree - Not applicable / Not valid  -- Disagree - Clinically unable to determine / Unknown  -- Refer to Clinical Documentation Reviewer    PROVIDER RESPONSE TEXT:    This patient has Enterococcus UTI with sepsis due to transplanted kidney   (kidney transplant infection).     Query created by: Piter Galeana on 2022 5:21 PM      Electronically signed by:  Lorri Peralta MD 2022 6:25 PM

## 2022-02-25 ENCOUNTER — APPOINTMENT (OUTPATIENT)
Dept: ULTRASOUND IMAGING | Age: 67
DRG: 177 | End: 2022-02-25
Payer: MEDICARE

## 2022-02-25 ENCOUNTER — APPOINTMENT (OUTPATIENT)
Dept: NUCLEAR MEDICINE | Age: 67
DRG: 177 | End: 2022-02-25
Payer: MEDICARE

## 2022-02-25 ENCOUNTER — APPOINTMENT (OUTPATIENT)
Dept: CT IMAGING | Age: 67
DRG: 177 | End: 2022-02-25
Payer: MEDICARE

## 2022-02-25 ENCOUNTER — HOSPITAL ENCOUNTER (INPATIENT)
Age: 67
LOS: 5 days | Discharge: NURSING, REHAB OR LONG-TERM CARE | DRG: 177 | End: 2022-03-02
Attending: EMERGENCY MEDICINE | Admitting: INTERNAL MEDICINE
Payer: MEDICARE

## 2022-02-25 ENCOUNTER — APPOINTMENT (OUTPATIENT)
Dept: GENERAL RADIOLOGY | Age: 67
DRG: 177 | End: 2022-02-25
Payer: MEDICARE

## 2022-02-25 DIAGNOSIS — N17.9 AKI (ACUTE KIDNEY INJURY) (HCC): ICD-10-CM

## 2022-02-25 DIAGNOSIS — N18.9 ACUTE KIDNEY INJURY SUPERIMPOSED ON CKD (HCC): ICD-10-CM

## 2022-02-25 DIAGNOSIS — N17.9 ACUTE KIDNEY INJURY SUPERIMPOSED ON CKD (HCC): ICD-10-CM

## 2022-02-25 DIAGNOSIS — E86.0 DEHYDRATION: ICD-10-CM

## 2022-02-25 DIAGNOSIS — R41.82 ALTERED MENTAL STATUS, UNSPECIFIED ALTERED MENTAL STATUS TYPE: ICD-10-CM

## 2022-02-25 DIAGNOSIS — K52.9 COLITIS: ICD-10-CM

## 2022-02-25 DIAGNOSIS — J96.01 ACUTE RESPIRATORY FAILURE WITH HYPOXIA (HCC): Primary | ICD-10-CM

## 2022-02-25 DIAGNOSIS — R77.8 ELEVATED TROPONIN: ICD-10-CM

## 2022-02-25 PROBLEM — R09.02 HYPOXIA: Status: ACTIVE | Noted: 2022-02-25

## 2022-02-25 LAB
ADENOVIRUS BY PCR: NOT DETECTED
ALBUMIN SERPL-MCNC: 3.5 G/DL (ref 3.5–5.2)
ALP BLD-CCNC: 105 U/L (ref 35–104)
ALT SERPL-CCNC: 10 U/L (ref 0–32)
AMMONIA: 22 UMOL/L (ref 11–51)
AMPHETAMINE SCREEN, URINE: NOT DETECTED
ANION GAP SERPL CALCULATED.3IONS-SCNC: 9 MMOL/L (ref 7–16)
ANISOCYTOSIS: ABNORMAL
APTT: 35.7 SEC (ref 24.5–35.1)
AST SERPL-CCNC: 16 U/L (ref 0–31)
B.E.: 5.5 MMOL/L (ref -3–3)
BACTERIA: ABNORMAL /HPF
BARBITURATE SCREEN URINE: NOT DETECTED
BASOPHILS ABSOLUTE: 0 E9/L (ref 0–0.2)
BASOPHILS RELATIVE PERCENT: 0.3 % (ref 0–2)
BENZODIAZEPINE SCREEN, URINE: NOT DETECTED
BILIRUB SERPL-MCNC: 1.2 MG/DL (ref 0–1.2)
BILIRUBIN URINE: NEGATIVE
BLOOD, URINE: NEGATIVE
BORDETELLA PARAPERTUSSIS BY PCR: NOT DETECTED
BORDETELLA PERTUSSIS BY PCR: NOT DETECTED
BUN BLDV-MCNC: 29 MG/DL (ref 6–23)
CALCIUM SERPL-MCNC: 10.6 MG/DL (ref 8.6–10.2)
CANNABINOID SCREEN URINE: NOT DETECTED
CHLAMYDOPHILIA PNEUMONIAE BY PCR: NOT DETECTED
CHLORIDE BLD-SCNC: 98 MMOL/L (ref 98–107)
CLARITY: CLEAR
CO2: 33 MMOL/L (ref 22–29)
COCAINE METABOLITE SCREEN URINE: NOT DETECTED
COHB: 0.7 % (ref 0–1.5)
COLOR: YELLOW
CORONAVIRUS 229E BY PCR: NOT DETECTED
CORONAVIRUS HKU1 BY PCR: NOT DETECTED
CORONAVIRUS NL63 BY PCR: NOT DETECTED
CORONAVIRUS OC43 BY PCR: NOT DETECTED
CREAT SERPL-MCNC: 1.7 MG/DL (ref 0.5–1)
CRITICAL: ABNORMAL
DATE ANALYZED: ABNORMAL
DATE OF COLLECTION: ABNORMAL
EKG ATRIAL RATE: 98 BPM
EKG P AXIS: 47 DEGREES
EKG P-R INTERVAL: 168 MS
EKG Q-T INTERVAL: 386 MS
EKG QRS DURATION: 152 MS
EKG QTC CALCULATION (BAZETT): 492 MS
EKG R AXIS: -40 DEGREES
EKG T AXIS: 113 DEGREES
EKG VENTRICULAR RATE: 98 BPM
EOSINOPHILS ABSOLUTE: 0 E9/L (ref 0.05–0.5)
EOSINOPHILS RELATIVE PERCENT: 0.6 % (ref 0–6)
EPITHELIAL CELLS, UA: ABNORMAL /HPF
FENTANYL SCREEN, URINE: NOT DETECTED
GFR AFRICAN AMERICAN: 36
GFR NON-AFRICAN AMERICAN: 30 ML/MIN/1.73
GLUCOSE BLD-MCNC: 113 MG/DL (ref 74–99)
GLUCOSE URINE: NEGATIVE MG/DL
HCO3: 31.5 MMOL/L (ref 22–26)
HCT VFR BLD CALC: 27.2 % (ref 34–48)
HCT VFR BLD CALC: 31.2 % (ref 34–48)
HEMOGLOBIN: 7.8 G/DL (ref 11.5–15.5)
HEMOGLOBIN: 8.9 G/DL (ref 11.5–15.5)
HHB: 6.7 % (ref 0–5)
HUMAN METAPNEUMOVIRUS BY PCR: NOT DETECTED
HUMAN RHINOVIRUS/ENTEROVIRUS BY PCR: NOT DETECTED
HYALINE CASTS: ABNORMAL /LPF (ref 0–2)
INFLUENZA A BY PCR: NOT DETECTED
INFLUENZA B BY PCR: NOT DETECTED
KETONES, URINE: NEGATIVE MG/DL
LAB: ABNORMAL
LACTIC ACID: 1 MMOL/L (ref 0.5–2.2)
LEUKOCYTE ESTERASE, URINE: NEGATIVE
LIPASE: 20 U/L (ref 13–60)
LYMPHOCYTES ABSOLUTE: 0.68 E9/L (ref 1.5–4)
LYMPHOCYTES RELATIVE PERCENT: 9.6 % (ref 20–42)
Lab: ABNORMAL
Lab: NORMAL
MCH RBC QN AUTO: 31.8 PG (ref 26–35)
MCH RBC QN AUTO: 32.1 PG (ref 26–35)
MCHC RBC AUTO-ENTMCNC: 28.5 % (ref 32–34.5)
MCHC RBC AUTO-ENTMCNC: 28.7 % (ref 32–34.5)
MCV RBC AUTO: 111.4 FL (ref 80–99.9)
MCV RBC AUTO: 111.9 FL (ref 80–99.9)
METHADONE SCREEN, URINE: NOT DETECTED
METHB: 0.5 % (ref 0–1.5)
MODE: ABNORMAL
MONOCYTES ABSOLUTE: 0.48 E9/L (ref 0.1–0.95)
MONOCYTES RELATIVE PERCENT: 7 % (ref 2–12)
MYCOPLASMA PNEUMONIAE BY PCR: NOT DETECTED
NEUTROPHILS ABSOLUTE: 5.71 E9/L (ref 1.8–7.3)
NEUTROPHILS RELATIVE PERCENT: 83.5 % (ref 43–80)
NITRITE, URINE: NEGATIVE
O2 CONTENT: 12.8 ML/DL
O2 SATURATION: 93.2 % (ref 92–98.5)
O2HB: 92.1 % (ref 94–97)
OPERATOR ID: 2323
OPIATE SCREEN URINE: NOT DETECTED
OVALOCYTES: ABNORMAL
OXYCODONE URINE: NOT DETECTED
PARAINFLUENZA VIRUS 1 BY PCR: NOT DETECTED
PARAINFLUENZA VIRUS 2 BY PCR: NOT DETECTED
PARAINFLUENZA VIRUS 3 BY PCR: NOT DETECTED
PARAINFLUENZA VIRUS 4 BY PCR: NOT DETECTED
PATIENT TEMP: 37 C
PCO2: 53.8 MMHG (ref 35–45)
PDW BLD-RTO: 17.2 FL (ref 11.5–15)
PDW BLD-RTO: 17.8 FL (ref 11.5–15)
PH BLOOD GAS: 7.38 (ref 7.35–7.45)
PH UA: 5.5 (ref 5–9)
PHENCYCLIDINE SCREEN URINE: NOT DETECTED
PLATELET # BLD: 141 E9/L (ref 130–450)
PLATELET # BLD: 175 E9/L (ref 130–450)
PMV BLD AUTO: 12.4 FL (ref 7–12)
PMV BLD AUTO: 12.9 FL (ref 7–12)
PO2: 70.1 MMHG (ref 75–100)
POIKILOCYTES: ABNORMAL
POLYCHROMASIA: ABNORMAL
POTASSIUM REFLEX MAGNESIUM: 4.2 MMOL/L (ref 3.5–5)
PRO-BNP: 1013 PG/ML (ref 0–125)
PROTEIN UA: NEGATIVE MG/DL
RBC # BLD: 2.43 E12/L (ref 3.5–5.5)
RBC # BLD: 2.8 E12/L (ref 3.5–5.5)
RBC UA: ABNORMAL /HPF (ref 0–2)
RESPIRATORY SYNCYTIAL VIRUS BY PCR: NOT DETECTED
SARS-COV-2, NAAT: NOT DETECTED
SARS-COV-2, PCR: NOT DETECTED
SODIUM BLD-SCNC: 140 MMOL/L (ref 132–146)
SOURCE, BLOOD GAS: ABNORMAL
SPECIFIC GRAVITY UA: 1.02 (ref 1–1.03)
THB: 9.8 G/DL (ref 11.5–16.5)
TIME ANALYZED: 135
TOTAL CK: 21 U/L (ref 20–180)
TOTAL PROTEIN: 6.7 G/DL (ref 6.4–8.3)
TROPONIN, HIGH SENSITIVITY: 176 NG/L (ref 0–9)
TROPONIN, HIGH SENSITIVITY: 191 NG/L (ref 0–9)
UROBILINOGEN, URINE: 0.2 E.U./DL
WBC # BLD: 6.4 E9/L (ref 4.5–11.5)
WBC # BLD: 6.8 E9/L (ref 4.5–11.5)
WBC UA: ABNORMAL /HPF (ref 0–5)

## 2022-02-25 PROCEDURE — 87150 DNA/RNA AMPLIFIED PROBE: CPT

## 2022-02-25 PROCEDURE — 93005 ELECTROCARDIOGRAM TRACING: CPT | Performed by: EMERGENCY MEDICINE

## 2022-02-25 PROCEDURE — 82140 ASSAY OF AMMONIA: CPT

## 2022-02-25 PROCEDURE — 6360000002 HC RX W HCPCS: Performed by: INTERNAL MEDICINE

## 2022-02-25 PROCEDURE — 2500000003 HC RX 250 WO HCPCS: Performed by: EMERGENCY MEDICINE

## 2022-02-25 PROCEDURE — 3430000000 HC RX DIAGNOSTIC RADIOPHARMACEUTICAL: Performed by: RADIOLOGY

## 2022-02-25 PROCEDURE — 6360000002 HC RX W HCPCS: Performed by: EMERGENCY MEDICINE

## 2022-02-25 PROCEDURE — 2580000003 HC RX 258: Performed by: INTERNAL MEDICINE

## 2022-02-25 PROCEDURE — 99285 EMERGENCY DEPT VISIT HI MDM: CPT

## 2022-02-25 PROCEDURE — 96361 HYDRATE IV INFUSION ADD-ON: CPT

## 2022-02-25 PROCEDURE — 87635 SARS-COV-2 COVID-19 AMP PRB: CPT

## 2022-02-25 PROCEDURE — 97530 THERAPEUTIC ACTIVITIES: CPT | Performed by: OCCUPATIONAL THERAPIST

## 2022-02-25 PROCEDURE — 83690 ASSAY OF LIPASE: CPT

## 2022-02-25 PROCEDURE — 51702 INSERT TEMP BLADDER CATH: CPT

## 2022-02-25 PROCEDURE — 87077 CULTURE AEROBIC IDENTIFY: CPT

## 2022-02-25 PROCEDURE — 74177 CT ABD & PELVIS W/CONTRAST: CPT

## 2022-02-25 PROCEDURE — 81001 URINALYSIS AUTO W/SCOPE: CPT

## 2022-02-25 PROCEDURE — 82805 BLOOD GASES W/O2 SATURATION: CPT

## 2022-02-25 PROCEDURE — 80307 DRUG TEST PRSMV CHEM ANLYZR: CPT

## 2022-02-25 PROCEDURE — 2700000000 HC OXYGEN THERAPY PER DAY

## 2022-02-25 PROCEDURE — 92610 EVALUATE SWALLOWING FUNCTION: CPT | Performed by: SPEECH-LANGUAGE PATHOLOGIST

## 2022-02-25 PROCEDURE — 97165 OT EVAL LOW COMPLEX 30 MIN: CPT | Performed by: OCCUPATIONAL THERAPIST

## 2022-02-25 PROCEDURE — 0202U NFCT DS 22 TRGT SARS-COV-2: CPT

## 2022-02-25 PROCEDURE — 71045 X-RAY EXAM CHEST 1 VIEW: CPT

## 2022-02-25 PROCEDURE — 6370000000 HC RX 637 (ALT 250 FOR IP): Performed by: INTERNAL MEDICINE

## 2022-02-25 PROCEDURE — 97535 SELF CARE MNGMENT TRAINING: CPT | Performed by: OCCUPATIONAL THERAPIST

## 2022-02-25 PROCEDURE — 94640 AIRWAY INHALATION TREATMENT: CPT

## 2022-02-25 PROCEDURE — 6360000004 HC RX CONTRAST MEDICATION: Performed by: RADIOLOGY

## 2022-02-25 PROCEDURE — 85730 THROMBOPLASTIN TIME PARTIAL: CPT

## 2022-02-25 PROCEDURE — A9540 TC99M MAA: HCPCS | Performed by: RADIOLOGY

## 2022-02-25 PROCEDURE — 2580000003 HC RX 258: Performed by: EMERGENCY MEDICINE

## 2022-02-25 PROCEDURE — 36415 COLL VENOUS BLD VENIPUNCTURE: CPT

## 2022-02-25 PROCEDURE — A9539 TC99M PENTETATE: HCPCS | Performed by: RADIOLOGY

## 2022-02-25 PROCEDURE — 78582 LUNG VENTILAT&PERFUS IMAGING: CPT

## 2022-02-25 PROCEDURE — 82550 ASSAY OF CK (CPK): CPT

## 2022-02-25 PROCEDURE — 83880 ASSAY OF NATRIURETIC PEPTIDE: CPT

## 2022-02-25 PROCEDURE — 84484 ASSAY OF TROPONIN QUANT: CPT

## 2022-02-25 PROCEDURE — 80053 COMPREHEN METABOLIC PANEL: CPT

## 2022-02-25 PROCEDURE — 70450 CT HEAD/BRAIN W/O DYE: CPT

## 2022-02-25 PROCEDURE — 87045 FECES CULTURE AEROBIC BACT: CPT

## 2022-02-25 PROCEDURE — 87040 BLOOD CULTURE FOR BACTERIA: CPT

## 2022-02-25 PROCEDURE — 83605 ASSAY OF LACTIC ACID: CPT

## 2022-02-25 PROCEDURE — 97161 PT EVAL LOW COMPLEX 20 MIN: CPT | Performed by: PHYSICAL THERAPIST

## 2022-02-25 PROCEDURE — 94660 CPAP INITIATION&MGMT: CPT

## 2022-02-25 PROCEDURE — 93970 EXTREMITY STUDY: CPT

## 2022-02-25 PROCEDURE — 85025 COMPLETE CBC W/AUTO DIFF WBC: CPT

## 2022-02-25 PROCEDURE — 92523 SPEECH SOUND LANG COMPREHEN: CPT | Performed by: SPEECH-LANGUAGE PATHOLOGIST

## 2022-02-25 PROCEDURE — 96360 HYDRATION IV INFUSION INIT: CPT

## 2022-02-25 PROCEDURE — 2060000000 HC ICU INTERMEDIATE R&B

## 2022-02-25 PROCEDURE — 94664 DEMO&/EVAL PT USE INHALER: CPT

## 2022-02-25 PROCEDURE — 85027 COMPLETE CBC AUTOMATED: CPT

## 2022-02-25 PROCEDURE — 71275 CT ANGIOGRAPHY CHEST: CPT

## 2022-02-25 RX ORDER — MYCOPHENOLATE MOFETIL 250 MG/1
1000 CAPSULE ORAL 2 TIMES DAILY
Status: DISCONTINUED | OUTPATIENT
Start: 2022-02-25 | End: 2022-03-02 | Stop reason: HOSPADM

## 2022-02-25 RX ORDER — HEPARIN SODIUM 1000 [USP'U]/ML
80 INJECTION, SOLUTION INTRAVENOUS; SUBCUTANEOUS ONCE
Status: COMPLETED | OUTPATIENT
Start: 2022-02-25 | End: 2022-02-25

## 2022-02-25 RX ORDER — POLYETHYLENE GLYCOL 3350 17 G/17G
17 POWDER, FOR SOLUTION ORAL DAILY PRN
Status: DISCONTINUED | OUTPATIENT
Start: 2022-02-25 | End: 2022-03-01

## 2022-02-25 RX ORDER — ATORVASTATIN CALCIUM 10 MG/1
10 TABLET, FILM COATED ORAL NIGHTLY
COMMUNITY
End: 2022-09-15

## 2022-02-25 RX ORDER — ACETAMINOPHEN 650 MG/1
650 SUPPOSITORY RECTAL EVERY 6 HOURS PRN
Status: DISCONTINUED | OUTPATIENT
Start: 2022-02-25 | End: 2022-03-02 | Stop reason: HOSPADM

## 2022-02-25 RX ORDER — HEPARIN SODIUM 1000 [USP'U]/ML
80 INJECTION, SOLUTION INTRAVENOUS; SUBCUTANEOUS PRN
Status: DISCONTINUED | OUTPATIENT
Start: 2022-02-25 | End: 2022-02-26

## 2022-02-25 RX ORDER — 0.9 % SODIUM CHLORIDE 0.9 %
500 INTRAVENOUS SOLUTION INTRAVENOUS ONCE
Status: COMPLETED | OUTPATIENT
Start: 2022-02-25 | End: 2022-02-25

## 2022-02-25 RX ORDER — IPRATROPIUM BROMIDE AND ALBUTEROL SULFATE 2.5; .5 MG/3ML; MG/3ML
1 SOLUTION RESPIRATORY (INHALATION) 4 TIMES DAILY
Status: DISCONTINUED | OUTPATIENT
Start: 2022-02-25 | End: 2022-03-02 | Stop reason: HOSPADM

## 2022-02-25 RX ORDER — DESVENLAFAXINE 50 MG/1
50 TABLET, EXTENDED RELEASE ORAL EVERY EVENING
Status: DISCONTINUED | OUTPATIENT
Start: 2022-02-25 | End: 2022-03-02 | Stop reason: HOSPADM

## 2022-02-25 RX ORDER — ALLOPURINOL 100 MG/1
100 TABLET ORAL DAILY
Status: DISCONTINUED | OUTPATIENT
Start: 2022-02-25 | End: 2022-03-02 | Stop reason: HOSPADM

## 2022-02-25 RX ORDER — FOLIC ACID 1 MG/1
1 TABLET ORAL DAILY
Status: DISCONTINUED | OUTPATIENT
Start: 2022-02-25 | End: 2022-03-02 | Stop reason: HOSPADM

## 2022-02-25 RX ORDER — BUDESONIDE 0.5 MG/2ML
500 INHALANT ORAL 2 TIMES DAILY
Status: DISCONTINUED | OUTPATIENT
Start: 2022-02-25 | End: 2022-03-02 | Stop reason: HOSPADM

## 2022-02-25 RX ORDER — 0.9 % SODIUM CHLORIDE 0.9 %
1000 INTRAVENOUS SOLUTION INTRAVENOUS ONCE
Status: COMPLETED | OUTPATIENT
Start: 2022-02-25 | End: 2022-02-25

## 2022-02-25 RX ORDER — SODIUM CHLORIDE 0.9 % (FLUSH) 0.9 %
5-40 SYRINGE (ML) INJECTION EVERY 12 HOURS SCHEDULED
Status: DISCONTINUED | OUTPATIENT
Start: 2022-02-25 | End: 2022-02-28

## 2022-02-25 RX ORDER — HEPARIN SODIUM 10000 [USP'U]/100ML
5-30 INJECTION, SOLUTION INTRAVENOUS CONTINUOUS
Status: DISCONTINUED | OUTPATIENT
Start: 2022-02-25 | End: 2022-02-26

## 2022-02-25 RX ORDER — 0.9 % SODIUM CHLORIDE 0.9 %
25 INTRAVENOUS SOLUTION INTRAVENOUS EVERY 8 HOURS
Status: DISCONTINUED | OUTPATIENT
Start: 2022-02-25 | End: 2022-03-02 | Stop reason: HOSPADM

## 2022-02-25 RX ORDER — KIT FOR THE PREPARATION OF TECHNETIUM TC 99M PENTETATE 20 MG/1
35 INJECTION, POWDER, LYOPHILIZED, FOR SOLUTION INTRAVENOUS; RESPIRATORY (INHALATION)
Status: COMPLETED | OUTPATIENT
Start: 2022-02-25 | End: 2022-02-25

## 2022-02-25 RX ORDER — ONDANSETRON 4 MG/1
4 TABLET, ORALLY DISINTEGRATING ORAL EVERY 8 HOURS PRN
Status: DISCONTINUED | OUTPATIENT
Start: 2022-02-25 | End: 2022-03-02 | Stop reason: HOSPADM

## 2022-02-25 RX ORDER — ONDANSETRON 2 MG/ML
4 INJECTION INTRAMUSCULAR; INTRAVENOUS EVERY 6 HOURS PRN
Status: DISCONTINUED | OUTPATIENT
Start: 2022-02-25 | End: 2022-03-02 | Stop reason: HOSPADM

## 2022-02-25 RX ORDER — PREDNISONE 1 MG/1
5 TABLET ORAL DAILY
Status: DISCONTINUED | OUTPATIENT
Start: 2022-02-25 | End: 2022-03-02 | Stop reason: HOSPADM

## 2022-02-25 RX ORDER — DOCUSATE SODIUM 100 MG/1
100 CAPSULE, LIQUID FILLED ORAL 2 TIMES DAILY
Status: DISCONTINUED | OUTPATIENT
Start: 2022-02-25 | End: 2022-03-02 | Stop reason: HOSPADM

## 2022-02-25 RX ORDER — CYCLOSPORINE 25 MG/1
100 CAPSULE, GELATIN COATED ORAL 2 TIMES DAILY
Status: DISCONTINUED | OUTPATIENT
Start: 2022-02-25 | End: 2022-03-02 | Stop reason: HOSPADM

## 2022-02-25 RX ORDER — CLOZAPINE 25 MG/1
50 TABLET ORAL NIGHTLY
Status: DISCONTINUED | OUTPATIENT
Start: 2022-02-25 | End: 2022-03-02 | Stop reason: HOSPADM

## 2022-02-25 RX ORDER — ACETAMINOPHEN 325 MG/1
650 TABLET ORAL EVERY 6 HOURS PRN
Status: DISCONTINUED | OUTPATIENT
Start: 2022-02-25 | End: 2022-03-02 | Stop reason: HOSPADM

## 2022-02-25 RX ORDER — SODIUM CHLORIDE 9 MG/ML
25 INJECTION, SOLUTION INTRAVENOUS PRN
Status: DISCONTINUED | OUTPATIENT
Start: 2022-02-25 | End: 2022-03-02 | Stop reason: HOSPADM

## 2022-02-25 RX ORDER — SODIUM CHLORIDE 0.9 % (FLUSH) 0.9 %
5-40 SYRINGE (ML) INJECTION PRN
Status: DISCONTINUED | OUTPATIENT
Start: 2022-02-25 | End: 2022-03-02 | Stop reason: HOSPADM

## 2022-02-25 RX ORDER — LEVOTHYROXINE SODIUM 0.05 MG/1
50 TABLET ORAL DAILY
Status: DISCONTINUED | OUTPATIENT
Start: 2022-02-25 | End: 2022-03-02 | Stop reason: HOSPADM

## 2022-02-25 RX ORDER — HEPARIN SODIUM 1000 [USP'U]/ML
40 INJECTION, SOLUTION INTRAVENOUS; SUBCUTANEOUS PRN
Status: DISCONTINUED | OUTPATIENT
Start: 2022-02-25 | End: 2022-02-26

## 2022-02-25 RX ORDER — MYCOPHENOLATE MOFETIL 500 MG/1
1000 TABLET ORAL 2 TIMES DAILY
Status: DISCONTINUED | OUTPATIENT
Start: 2022-02-25 | End: 2022-02-25 | Stop reason: SDUPTHER

## 2022-02-25 RX ADMIN — CLOZAPINE 50 MG: 25 TABLET ORAL at 21:10

## 2022-02-25 RX ADMIN — PREDNISONE 5 MG: 5 TABLET ORAL at 09:53

## 2022-02-25 RX ADMIN — ALLOPURINOL 100 MG: 100 TABLET ORAL at 09:52

## 2022-02-25 RX ADMIN — SODIUM CHLORIDE 1000 ML: 9 INJECTION, SOLUTION INTRAVENOUS at 07:00

## 2022-02-25 RX ADMIN — MYCOPHENOLATE MOFETIL 1000 MG: 250 CAPSULE ORAL at 21:10

## 2022-02-25 RX ADMIN — PIPERACILLIN SODIUM AND TAZOBACTAM SODIUM 3375 MG: 3; .375 INJECTION, POWDER, LYOPHILIZED, FOR SOLUTION INTRAVENOUS at 21:09

## 2022-02-25 RX ADMIN — BUDESONIDE 500 MCG: 0.5 INHALANT RESPIRATORY (INHALATION) at 07:05

## 2022-02-25 RX ADMIN — SODIUM CHLORIDE 1000 ML: 9 INJECTION, SOLUTION INTRAVENOUS at 04:10

## 2022-02-25 RX ADMIN — IPRATROPIUM BROMIDE AND ALBUTEROL SULFATE 1 AMPULE: .5; 2.5 SOLUTION RESPIRATORY (INHALATION) at 18:23

## 2022-02-25 RX ADMIN — METRONIDAZOLE 500 MG: 500 INJECTION, SOLUTION INTRAVENOUS at 04:58

## 2022-02-25 RX ADMIN — IOPAMIDOL 75 ML: 755 INJECTION, SOLUTION INTRAVENOUS at 03:31

## 2022-02-25 RX ADMIN — CYCLOSPORINE 100 MG: 25 CAPSULE, GELATIN COATED ORAL at 09:53

## 2022-02-25 RX ADMIN — MYCOPHENOLATE MOFETIL 1000 MG: 250 CAPSULE ORAL at 09:53

## 2022-02-25 RX ADMIN — HEPARIN SODIUM 7980 UNITS: 1000 INJECTION INTRAVENOUS; SUBCUTANEOUS at 21:10

## 2022-02-25 RX ADMIN — HEPARIN SODIUM 18 UNITS/KG/HR: 10000 INJECTION, SOLUTION INTRAVENOUS at 21:12

## 2022-02-25 RX ADMIN — PIPERACILLIN SODIUM AND TAZOBACTAM SODIUM 3375 MG: 3; .375 INJECTION, POWDER, LYOPHILIZED, FOR SOLUTION INTRAVENOUS at 12:31

## 2022-02-25 RX ADMIN — KIT FOR THE PREPARATION OF TECHNETIUM TC 99M PENTETATE 35 MILLICURIE: 20 INJECTION, POWDER, LYOPHILIZED, FOR SOLUTION INTRAVENOUS; RESPIRATORY (INHALATION) at 14:06

## 2022-02-25 RX ADMIN — Medication 6 MILLICURIE: at 14:06

## 2022-02-25 RX ADMIN — IPRATROPIUM BROMIDE AND ALBUTEROL SULFATE 1 AMPULE: .5; 2.5 SOLUTION RESPIRATORY (INHALATION) at 07:05

## 2022-02-25 RX ADMIN — CEFEPIME HYDROCHLORIDE 2000 MG: 2 INJECTION, POWDER, FOR SOLUTION INTRAVENOUS at 04:20

## 2022-02-25 RX ADMIN — SODIUM CHLORIDE 500 ML: 9 INJECTION, SOLUTION INTRAVENOUS at 02:08

## 2022-02-25 RX ADMIN — IPRATROPIUM BROMIDE AND ALBUTEROL SULFATE 1 AMPULE: .5; 2.5 SOLUTION RESPIRATORY (INHALATION) at 10:31

## 2022-02-25 RX ADMIN — CYCLOSPORINE 100 MG: 25 CAPSULE, GELATIN COATED ORAL at 21:10

## 2022-02-25 RX ADMIN — ENOXAPARIN SODIUM 40 MG: 100 INJECTION SUBCUTANEOUS at 09:53

## 2022-02-25 RX ADMIN — FOLIC ACID 1 MG: 1 TABLET ORAL at 09:53

## 2022-02-25 RX ADMIN — BUDESONIDE 500 MCG: 0.5 INHALANT RESPIRATORY (INHALATION) at 18:23

## 2022-02-25 RX ADMIN — DOCUSATE SODIUM 100 MG: 100 CAPSULE ORAL at 09:53

## 2022-02-25 RX ADMIN — Medication 10 ML: at 09:53

## 2022-02-25 RX ADMIN — DOCUSATE SODIUM 100 MG: 100 CAPSULE ORAL at 21:10

## 2022-02-25 RX ADMIN — Medication 10 ML: at 21:10

## 2022-02-25 ASSESSMENT — PAIN SCALES - GENERAL
PAINLEVEL_OUTOF10: 0

## 2022-02-25 NOTE — ED PROVIDER NOTES
This is a 43-year-old female with a past medical history of renal transplant as well as hypertension hyperlipidemia presents to the ED for evaluation of altered mental status. History is help obtained from EMS as the patient is confused. Apparently the patient is at a skilled nursing facility and was found to be more confused today. Patient was talking and stating that she was in [place] and stated that she went to a movie today all of which were incorrect apparently. Patient was also 76% on room air does not use oxygen had to be placed on 3 L submental oxygen with improvement in her oxygen saturation status. No reported fevers or chills. No other reported mitigating or exacerbating factors. Complete review of systems and history is limited secondary the patient's clinical condition. The history is provided by the patient and the EMS personnel. No  was used. Review of Systems   Unable to perform ROS: Acuity of condition        Physical Exam  Vitals and nursing note reviewed. Constitutional:       General: She is not in acute distress. Appearance: She is well-developed. She is ill-appearing. HENT:      Head: Normocephalic and atraumatic. Mouth/Throat:      Mouth: Mucous membranes are dry. Eyes:      Extraocular Movements: Extraocular movements intact. Neck:      Vascular: No JVD. Cardiovascular:      Rate and Rhythm: Regular rhythm. Tachycardia present. Pulmonary:      Effort: Pulmonary effort is normal.      Breath sounds: No wheezing, rhonchi or rales. Comments: Diminished breath sounds at bases no signs of respiratory distress or conversational dyspnea  Chest:      Chest wall: No tenderness. Abdominal:      General: There is distension. Palpations: Abdomen is soft. Tenderness: There is no abdominal tenderness. There is no guarding or rebound. Hernia: No hernia is present.    Musculoskeletal:      Cervical back: Normal range of motion and neck supple. No rigidity. Right lower leg: No edema. Left lower leg: No edema. Skin:     General: Skin is warm and dry. Capillary Refill: Capillary refill takes less than 2 seconds. Neurological:      General: No focal deficit present. Mental Status: She is alert. Cranial Nerves: No cranial nerve deficit. Comments: Somnolent but awakes and will talk to you with fluent speech but confused at times only knows name does not know place or year   Psychiatric:         Mood and Affect: Mood normal.         Behavior: Behavior normal.          Procedures     MDM  Number of Diagnoses or Management Options  Acute respiratory failure with hypoxia (HCC)  Altered mental status, unspecified altered mental status type  Colitis  Dehydration  Elevated troponin  Diagnosis management comments: A 46-year-old female who has had multiple admissions to this hospital over the past year. Patient apparently was increasingly confused and was hypoxic at the skilled nursing facility today. Patient was placed on 3 L nasal cannula significant improvement in her oxygenation status. Patient had fluent speech but was confused no meningeal signs patient was afebrile. Patient had no leukocytosis normal lactate no meningeal signs no focal neurological deficits. Patient had no signs of pulmonary embolism on CTA CT of the abdomen showed no signs intra-abdominal perforation or abscess did show possible enteritis and likely colitis she was covered for antibiotics given her immunocompromise state. Spoke with the patient's PCP who stated that she has had multiple admissions with similar presentations I did send cultures off did attempt to speak with son however nobody answered.   Patient improvement of blood pressure with IV fluids she was admitted to intermediate floor        ED Course as of 02/25/22 0439   Fri Feb 25, 2022   0302 Attempted to call son, patient did not answer [BP]   0407 MAP of 57, another liter of fluid ordered [BP]      ED Course User Index  [BP] Mortimer Cargo, DO           ED Course as of 22 0439      0302 Attempted to call son, patient did not answer [BP]   0407 MAP of 57, another liter of fluid ordered [BP]      ED Course User Index  [BP] Mortimer Cargo, DO       --------------------------------------------- PAST HISTORY ---------------------------------------------  Past Medical History:  has a past medical history of Abnormal EKG, Acute gout involving toe of right foot, Acute gout involving toe of right foot, Acute on chronic combined systolic (congestive) and diastolic (congestive) heart failure ([address] Cancer ([facility] [address] [address] Cerebrovascular disease, Clotting disorder ([facility] Depression, Hyperlipidemia, Hypertension, Hyperthyroidism, Leg swelling, Lymphedema of both lower extremities, Peripheral vascular disease ([address] Renal failure, Thrombophlebitis, and Thyroid disease. Past Surgical History:  has a past surgical history that includes Diagnostic Cardiac Cath Lab Procedure (); femoral bypass (); parathyroidectomy (); Colonoscopy;  section (); Dialysis fistula creation (march and 2012); Kidney transplant (); Kidney transplant (); Kidney transplant (); and colectomy (N/A, 1/15/2022). Social History:  reports that she quit smoking about 6 years ago. She has a 20.00 pack-year smoking history. She has never used smokeless tobacco. She reports that she does not drink alcohol and does not use drugs. Family History: family history includes Dementia in her father; Diabetes in her father and mother. The patients home medications have been reviewed.     Allergies: Macrodantin [nitrofurantoin macrocrystal] and Sulfa antibiotics    -------------------------------------------------- RESULTS -------------------------------------------------    LABS:  Results for orders placed or performed during the hospital encounter of 22   COVID-Tonie, Rapid Specimen: Nasopharyngeal Swab   Result Value Ref Range    SARS-CoV-2, NAAT Not Detected Not Detected   Comprehensive Metabolic Panel w/ Reflex to MG   Result Value Ref Range    Sodium 140 132 - 146 mmol/L    Potassium reflex Magnesium 4.2 3.5 - 5.0 mmol/L    Chloride 98 98 - 107 mmol/L    CO2 33 (H) 22 - 29 mmol/L    Anion Gap 9 7 - 16 mmol/L    Glucose 113 (H) 74 - 99 mg/dL    BUN 29 (H) 6 - 23 mg/dL    CREATININE 1.7 (H) 0.5 - 1.0 mg/dL    GFR Non-African American 30 >=60 mL/min/1.73    GFR African American 36     Calcium 10.6 (H) 8.6 - 10.2 mg/dL    Total Protein 6.7 6.4 - 8.3 g/dL    Albumin 3.5 3.5 - 5.2 g/dL    Total Bilirubin 1.2 0.0 - 1.2 mg/dL    Alkaline Phosphatase 105 (H) 35 - 104 U/L    ALT 10 0 - 32 U/L    AST 16 0 - 31 U/L   CBC with Auto Differential   Result Value Ref Range    WBC 6.8 4.5 - 11.5 E9/L    RBC 2.80 (L) 3.50 - 5.50 E12/L    Hemoglobin 8.9 (L) 11.5 - 15.5 g/dL    Hematocrit 31.2 (L) 34.0 - 48.0 %    .4 (H) 80.0 - 99.9 fL    MCH 31.8 26.0 - 35.0 pg    MCHC 28.5 (L) 32.0 - 34.5 %    RDW 17.8 (H) 11.5 - 15.0 fL    Platelets 940 373 - 625 E9/L    MPV 12.9 (H) 7.0 - 12.0 fL    Neutrophils % 83.5 (H) 43.0 - 80.0 %    Lymphocytes % 9.6 (L) 20.0 - 42.0 %    Monocytes % 7.0 2.0 - 12.0 %    Eosinophils % 0.6 0.0 - 6.0 %    Basophils % 0.3 0.0 - 2.0 %    Neutrophils Absolute 5.71 1.80 - 7.30 E9/L    Lymphocytes Absolute 0.68 (L) 1.50 - 4.00 E9/L    Monocytes Absolute 0.48 0.10 - 0.95 E9/L    Eosinophils Absolute 0.00 (L) 0.05 - 0.50 E9/L    Basophils Absolute 0.00 0.00 - 0.20 E9/L    Anisocytosis 1+     Polychromasia 1+     Poikilocytes 1+     Ovalocytes 1+    Troponin   Result Value Ref Range    Troponin, High Sensitivity 191 (H) 0 - 9 ng/L   Brain Natriuretic Peptide   Result Value Ref Range    Pro-BNP 1,013 (H) 0 - 125 pg/mL   Lipase   Result Value Ref Range    Lipase 20 13 - 60 U/L   Lactic Acid   Result Value Ref Range    Lactic Acid 1.0 0.5 - 2.2 mmol/L   CK   Result Value Ref Range Total CK 21 20 - 180 U/L   Ammonia   Result Value Ref Range    Ammonia 22.0 11.0 - 51.0 umol/L   Urinalysis with Microscopic   Result Value Ref Range    Color, UA Yellow Straw/Yellow    Clarity, UA Clear Clear    Glucose, Ur Negative Negative mg/dL    Bilirubin Urine Negative Negative    Ketones, Urine Negative Negative mg/dL    Specific Gravity, UA 1.025 1.005 - 1.030    Blood, Urine Negative Negative    pH, UA 5.5 5.0 - 9.0    Protein, UA Negative Negative mg/dL    Urobilinogen, Urine 0.2 <2.0 E.U./dL    Nitrite, Urine Negative Negative    Leukocyte Esterase, Urine Negative Negative    Hyaline Casts, UA 0-2 0 - 2 /LPF    WBC, UA 1-3 0 - 5 /HPF    RBC, UA NONE 0 - 2 /HPF    Epithelial Cells, UA RARE /HPF    Bacteria, UA RARE (A) None Seen /HPF   Blood Gas, Arterial   Result Value Ref Range    Date Analyzed 20220225     Time Analyzed 0135     Source: Blood Arterial     pH, Blood Gas 7.385 7.350 - 7.450    PCO2 53.8 (H) 35.0 - 45.0 mmHg    PO2 70.1 (L) 75.0 - 100.0 mmHg    HCO3 31.5 (H) 22.0 - 26.0 mmol/L    B.E. 5.5 (H) -3.0 - 3.0 mmol/L    O2 Sat 93.2 92.0 - 98.5 %    O2Hb 92.1 (L) 94.0 - 97.0 %    COHb 0.7 0.0 - 1.5 %    MetHb 0.5 0.0 - 1.5 %    O2 Content 12.8 mL/dL    HHb 6.7 (H) 0.0 - 5.0 %    tHb (est) 9.8 (L) 11.5 - 16.5 g/dL    Mode NC- 3 L     Date Of Collection      Time Collected      Pt Temp 37.0 C     ID [id number]     Lab [id number]     Critical(s) Notified . No Critical Values    Troponin   Result Value Ref Range    Troponin, High Sensitivity 176 (H) 0 - 9 ng/L     EKG: This EKG is signed and interpreted by me. Rate: 98  Rhythm: Sinus  Interpretation: Left axis deviation, LBBB no evidence of scarbossa critiera, no st depressions QTc at 492  Comparison: was normal    RADIOLOGY:  CT HEAD WO CONTRAST   Final Result   No acute intracranial abnormality.  MRI would be useful if symptoms persist.      Inflammatory changes of left greater than right mastoid air cells and left   sphenoid sinus similar to prior MRI. RECOMMENDATIONS:   Unavailable         CTA PULMONARY W CONTRAST   Final Result   Allowing for patient motion artifact, no identified pulmonary embolism. Significant improvement in aeration of the lungs with some residual areas of   presumed atelectasis in the right greater than left base. Cardiomegaly. RECOMMENDATIONS:   Unavailable         CT ABDOMEN PELVIS W IV CONTRAST Additional Contrast? None   Final Result   Thickening of the proximal ascending colon. Given the short-term change,   this is favored to represent a localized area of colitis likely infectious or   inflammatory. Neoplasia thought less likely but follow-up to resolution   would be recommended. Moderate stool in the distal colon. Minimal distention of several proximal small bowel loops likely incidental or   due to mild enteritis. Small hiatal hernia. Other chronic appearing findings. RECOMMENDATIONS:   Unavailable                   ------------------------- NURSING NOTES AND VITALS REVIEWED ---------------------------  Date / Time Roomed:  2/25/2022  1:08 AM  ED Bed Assignment:  01/01    The nursing notes within the ED encounter and vital signs as below have been reviewed.      Patient Vitals for the past 24 hrs:   BP Temp Temp src Pulse Resp SpO2 Height Weight   02/25/22 0410 (!) 90/50 -- -- -- 16 100 % -- --   02/25/22 0210 98/60 -- -- 93 15 94 % -- --   02/25/22 0208 -- -- -- -- -- -- 5' 2\" (1.575 m) 220 lb (99.8 kg)   02/25/22 0111 133/86 97.8 °F (36.6 °C) Oral 99 16 96 % -- --       Oxygen Saturation Interpretation: Abnormal    ------------------------------------------ PROGRESS NOTES ------------------------------------------  Re-evaluation(s):  Time: 920  Patients symptoms are improving  Repeat physical examination is improved    Counseling:  I have spoken with the patient and discussed todays results, in addition to providing specific details for the plan of care and counseling regarding the diagnosis and prognosis. Their questions are answered at this time and they are agreeable with the plan of admission.    --------------------------------- ADDITIONAL PROVIDER NOTES ---------------------------------  Consultations:  Spoke with Dr. Anastasia Hackett. Discussed case. They will admit the patient. This patient's ED course included: a personal history and physicial examination, re-evaluation prior to disposition, multiple bedside re-evaluations, IV medications, cardiac monitoring, continuous pulse oximetry and complex medical decision making and emergency management    This patient has remained hemodynamically stable during their ED course. Please note that the withdrawal or failure to initiate urgent interventions for this patient would likely result in a life threatening deterioration or permanent disability. Systems at risk for deterioration include: Cv/PUlm    Accordingly this patient received 30 minutes of critical care time, excluding separately billable procedures. Diagnosis:  1. Acute respiratory failure with hypoxia ([address]    2. Colitis    3. Dehydration    4. Elevated troponin    5. Altered mental status, unspecified altered mental status type        Disposition:  Patient's disposition: Admit to telemetry  Patient's condition is fair.        Simone Tavera DO  02/25/22 5170

## 2022-02-25 NOTE — Clinical Note
Discharge Plan[de-identified] Other/[facility])   Telemetry/Cardiac Monitoring Required?: Yes

## 2022-02-25 NOTE — DISCHARGE INSTR - COC
Continuity of Care Form    Patient Name: Henry Guerrero   :  1955  MRN:  [id number]    Admit date:  2022  Discharge date:  ***    Code Status Order: Full Code   Advance Directives:      Admitting Physician:  Erica Levi MD  PCP: Henry Foley MD    Discharging Nurse: [facility] Unit/Room#: 4952/9839-68  Discharging Unit Phone Number: ***    Emergency Contact:   Extended Emergency Contact Information  Primary Emergency Contact: Mayur Cheema   [facility] [address] Phone: [phone]  Mobile Phone: [phone]  Relation: Child   needed? No  Secondary Emergency Contact: Caridad Lazar  Mobile Phone: [phone]  Relation: Other  Preferred language: English   needed? No    Past Surgical History:  Past Surgical History:   Procedure Laterality Date     SECTION  1985    one normal delivery    COLECTOMY N/A 1/15/2022    DIAGNOSTIC  LAPAROSCOPY LYSIS OF ADHESIONS performed by Tara Cohen MD at [facility] CATH LAB PROCEDURE      normal coronaries and  normal coronaries    DIALYSIS FISTULA CREATION  march and 2012    phase one and two patient will start dialysis when needed    Racheal Rehilarioonickayla [address]    transfemoral venous bypass grafts    KIDNEY TRANSPLANT  2016    KIDNEY TRANSPLANT  2015    KIDNEY TRANSPLANT  2015    PARATHYROIDECTOMY  2006    left parathyroid  implant and parathyroidectomy       Immunization History:   Immunization History   Administered Date(s) Administered    COVID-19, Pfizer Purple top, DILUTE for use, 12+ yrs, 30mcg/0.3mL dose 2021, 2021, 2021       Active Problems:  Patient Active Problem List   Diagnosis Code    Peripheral vascular disease ([facility] I73.9    Depression F32. A    Clotting disorder ([facility]) D68.9    Abnormal EKG R94.31    Cancer ([facility]) C80.1    Over weight E66.3    S/p cadaver renal transplant Z94.0    ESRD (end stage renal disease) ([facility]) N18.6    Non morbid obesity due to excess calories E66.09    Hypertension I10    Leg swelling M79.89    Lymphedema of both lower extremities I89.0    Acute gout involving toe of right foot M10.9    Sepsis secondary to UTI ([address] A41.9, N39.0    Acute kidney injury superimposed on CKD ([address] N17.9, N18.9    Thyroid disease E07.9    Acute on chronic combined systolic (congestive) and diastolic (congestive) heart failure (HCC) I50.43    Sepsis (HCC) A41.9    Acute renal failure ([address] N17.9    CECILY (acute kidney injury) ([address] N17.9    Ischemic bowel disease ([facility] K55.9    Altered mental status R41.82    Coma ([facility] R40.20    Acute respiratory failure with hypoxia ([facility] J96.01    Hypoxia R09.02       Isolation/Infection:   Isolation            No Isolation          Patient Infection Status       Infection Onset Added Last Indicated Last Indicated By Review Planned Expiration Resolved Resolved By    None active    Resolved    COVID-19 (Rule Out) 22 COVID-19, Rapid (Ordered)   22 Rule-Out Test Resulted    COVID-19 22 COVID-19   22     COVID-19 (Rule Out) 22 COVID-19 (Ordered)   22 Rule-Out Test Resulted    C-diff Rule Out 22 CLOSTRIDIUM DIFFICILE EIA (Ordered)   22 Ole Opitz, RN    Order discontinued    COVID-19 (Rule Out) 22 Respiratory Panel, Molecular, with COVID-19 (Restricted: peds pts or suitable admitted adults) (Ordered)   22 Rule-Out Test Resulted    COVID-19 (Rule Out) 01/10/22 01/10/22 01/10/22 COVID-19, Rapid (Ordered)   01/10/22 Rule-Out Test Resulted    COVID-19 (Rule Out) 21 Respiratory Panel, Molecular, with COVID-19 (Restricted: peds pts or suitable admitted adults) (Ordered)   21 Rule-Out Test Resulted    COVID-19 (Rule Out) 21 COVID-19, Rapid (Ordered)   21 Rule-Out Test Resulted            Nurse Assessment:  Last Vital Signs: BP (!) 112/56   Pulse 73   Temp 96 °F (35.6 °C) (Infrared)   Resp 18   Ht 5' 2\" (1.575 m)   Wt 220 lb (99.8 kg)   SpO2 100%   BMI 40.24 kg/m²     Last documented pain score (0-10 scale): Pain Level: 0  Last Weight:   Wt Readings from Last 1 Encounters:   02/25/22 220 lb (99.8 kg)     Mental Status:  disoriented, able to concentrate and follow conversation, and intermittent confusion    IV Access:  - None    Nursing Mobility/ADLs:  Walking   Dependent  Transfer  Dependent  Bathing  Dependent  Dressing  Dependent  Toileting  Dependent  Feeding  Assisted  Med Admin  Assisted  Med Delivery   crushed    Wound Care Documentation and Therapy:        Elimination:  Continence: Bowel: No  Bladder: hassan  Urinary Catheter: Indication for Use of Catheter: Acute urinary retention/obstruction   Colostomy/Ileostomy/Ileal Conduit: No       Date of Last BM: 3/1/22   No intake or output data in the 24 hours ending 02/25/22 1103  No intake/output data recorded. Safety Concerns: At Risk for Falls and Aspiration Risk    Impairments/Disabilities:      Vision and Contractures - left arm    Nutrition Therapy:  Current Nutrition Therapy:   - Oral Diet:  Dysphagia 2 mechanically altered (soft and bite sized)    Routes of Feeding: Oral  Liquids: thin liquids  Daily Fluid Restriction: no  Last Modified Barium Swallow with Video (Video Swallowing Test): done on 2/26    Treatments at the Time of Hospital Discharge:   Respiratory Treatments: n/a  Oxygen Therapy:  is not on home oxygen therapy.   Ventilator:    - No ventilator support    Rehab Therapies: Physical Therapy, Occupational Therapy, and Speech/Language Therapy  Weight Bearing Status/Restrictions: No weight bearing restirctions  Other Medical Equipment (for information only, NOT a DME order):  wheelchair  Other Treatments: ***    Patient's personal belongings (please select all that are sent with patient):  None    RN SIGNATURE: doctor of a weight gain of 3 pounds or more in 1 day   OR a total of 5 pounds or more in 1 week    Take your weight record to your doctor visits  Also, the same goes if you loose more than 3# in one day, let your heart doctor know. DIET:   Cardiac heart healthy diet- Low saturated / low trans fat, no added salt, caffeine restricted, Low sodium diet-   No more than 2,000mg (2 grams) of salt / sodium per day (which equals to a little less than  a teaspoon of salt)  If your doctor wants you on a fluid restriction. ..it is usually recommended a fluid limit of 2,000cc -  Fluid restriction- 2,000 ml (milliliters) = 64 ounces = you can have 8 glasses of fluid per day (each glass 8 ounces)    Follow a low salt diet - avoid using salt at the table, avoid / limit use of canned soups, processed / packaged foods, salted snacks, olives and pickles. Do not use a salt substitute without checking with your doctor, they may contain a high amount of potassioum. (Mrs. Glenroy Tipton is safe to use). Limit the use of alcohol       CALL YOUR DOCTOR THE FIRST DAY YOU NOTICE ANY OF THESE   SYMPTOMS:  You have a weight gain of 3 pounds or more in 1 day         OR 5 pounds or more in one week  More shortness of breath  More swelling of your stomach, legs, ankles or feet  Feeling more tired, No energy  Dry hacky cough  Dizziness  More chest pain / discomfort       (CALL 911 IF ANY OF THE FOLLOWING OCCURS  Chest pain (not relieved with nitroglycerine, if you have been prescribed this medication)  Severe shortness of breath  Faint / Pass out  Confusion / cannot think clearly  If symptoms get worse           SMOKING - TOBACCO USE:  * IF YOU SMOKE - STOP! Kick the habit. [address] Program is offered at [facility] [address] and [address] [facility]. Call [phone] extension [phone] for more information.              ACTIVITY:   (Ask your doctor when you will be able to return to work ZONE? Or have you moved to the RED ZONE and need to call 911 or go to the Emergency Room for evaluation? Call your doctor's office to update them on your symptoms of heart failure. Learning About Heart Failure Zones  What are heart failure zones? Heart failure zones give you an easy way to see changes in your heart failure symptoms. They also tell you when you need to get help. Check every day to see which zone you are in. Green zone. You are doing well. This is where you want to be. Your weight is stable. It's not going up or down. You breathe easily. You are sleeping well. You are able to lie flat without shortness of breath. You can do your usual activities. Yellow zone. Be careful. Your symptoms are changing. Call your doctor. You have new or increased shortness of breath. You are dizzy or lightheaded, or you feel like you may faint. You have sudden weight gain, such as more than 2 to 3 pounds in a day or 5 pounds in a week. (Your doctor may suggest a different range of weight gain.)  You have increased swelling in your legs, ankles, or feet. You are so tired or weak that you can't do your usual activities. You are not sleeping well. Shortness of breath wakes you up at night. You need extra pillows. Red zone. 911  This is an emergency. Call . You have symptoms of sudden heart failure. For example: You have severe trouble breathing. You cough up pink, foamy mucus. You have a new irregular or fast heartbeat. You have symptoms of a heart attack. These may include:  Chest pain or pressure, or a strange feeling in the chest.  Sweating. Shortness of breath. Nausea or vomiting. Pain, pressure, or a strange feeling in the back, neck, jaw, or upper belly or in one or both shoulders or arms. Lightheadedness or sudden weakness. A fast or irregular heartbeat. If you have symptoms of a heart attack 911 :  After you call , the  may tell you to chew 1 adult-strength or 2 to 4 low-dose aspirin. Wait for an ambulance. Do not try to drive yourself. Follow-up care is a key part of your treatment and safety. Be sure to make and go to all appointments, and call your doctor if you are having problems. It's also a good idea to know your test results and keep a list of the medicines you take. Where can you learn more? Go to [email or web address] and sign in to your [employer] account. Enter T174 in the [facility] box to learn more about \"Learning About Heart Failure Zones. \"     If you do not have an account, please click on the \"Sign Up Now\" link. Current as of: August 31, 2020               Content Version: 12.9  © 2006-2021 Healthwise, Incorporated. Care instructions adapted under license by [facility] ([facility]). If you have questions about a medical condition or this instruction, always ask your healthcare professional. [place] [address] any warranty or liability for your use of this information.

## 2022-02-25 NOTE — PLAN OF CARE
Problem: Skin Integrity:  Goal: Will show no infection signs and symptoms  Description: Will show no infection signs and symptoms  2/25/2022 0630 by Jo Ann Rajput RN  Outcome: Met This Shift  2/25/2022 0630 by Jo Ann Rajput RN  Outcome: Met This Shift  Goal: Absence of new skin breakdown  Description: Absence of new skin breakdown  2/25/2022 0630 by Jo Ann Rajput RN  Outcome: Met This Shift  2/25/2022 0630 by Jo Ann Rajput RN  Outcome: Met This Shift     Problem: Falls - Risk of:  Goal: Will remain free from falls  Description: Will remain free from falls  2/25/2022 0630 by Jo Ann Rajput RN  Outcome: Met This Shift  2/25/2022 0630 by Jo Ann Rajput RN  Outcome: Met This Shift  Goal: Absence of physical injury  Description: Absence of physical injury  2/25/2022 0630 by Jo Ann Rajput RN  Outcome: Met This Shift  2/25/2022 0630 by Jo Ann Rajput RN  Outcome: Met This Shift

## 2022-02-25 NOTE — CARE COORDINATION
SOCIAL WORK / DISCHARGE PLANNING:  COVID neg 2/25. Pt admitted from [facility] and [address] [place] [address] fax # [phone] skilled. Per Wanda, rep pt is a bedhold, will accept back. NO PRECERT needed. Sw called sonKajal [phone] voice message left request return call to confirm dc plan to return. GINA will need signed by physician. Addendum: 1204pm Sw received call from son, Kajal Garrison . He does plan for pt to return to   [facility] and Rehab at [facility]. He was unable to obtain POA for Healthcare due to continued confusion. Rehab progress slow per son but was more alert, talking and feeding self. Sw did meet with pt who was feeding herself with hand, ignored Sw when attempted to assist with utensil use.                  Electronically signed by AG De La Cruz on 2/25/2022 at 11:01 AM

## 2022-02-25 NOTE — PROGRESS NOTES
[address] [facility]  [address], Shayy Magdaleno         Date:2022                                                   Patient Name: Spenser Dolan     MRN: [id number]     : 1955     Room: [address] [place] [address]       Evaluating OT: ASHLEY Quinteros/GERMAN; [id number]       Referring Provider and Orders/Date:   OT eval and treat Start: 22, End: 22, ONE TIME, Standing Count: 1 Occurrences, R    Order went unreviewed at transfer on  5:31 Kenneth Carrera MD       Diagnosis:   1. Acute respiratory failure with hypoxia ([address]    2. Colitis    3. Dehydration    4. Elevated troponin    5.  Altered mental status, unspecified altered mental status type           Pertinent Medical History:        Past Medical History:   Diagnosis Date    Abnormal EKG     left bundle branch block    Acute gout involving toe of right foot 9/3/2020    Acute gout involving toe of right foot 9/3/2020    Acute on chronic combined systolic (congestive) and diastolic (congestive) heart failure (HCC)     Cancer (HCC)     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    Cerebrovascular disease     Clotting disorder ([address] 1985    thrombophlebitis after c section delivery    Depression     15 years followed by dr Jeromy Hubbard Hyperlipidemia     Hypertension     Hyperthyroidism     Leg swelling 9/3/2020    Lymphedema of both lower extremities 9/3/2020    Peripheral vascular disease ([address]     Renal failure 2012    renal transplant    Thrombophlebitis     after c section delivery    Thyroid disease           Past Surgical History:   Procedure Laterality Date     SECTION  1985    one normal delivery    COLECTOMY N/A 1/15/2022    DIAGNOSTIC  LAPAROSCOPY LYSIS OF ADHESIONS performed by Alma Resendez MD at [address] [facility] CATH LAB PROCEDURE  2006    normal coronaries and 1996 normal coronaries    DIALYSIS FISTULA CREATION  march and july 2012    phase one and two patient will start dialysis when needed   [address]    transfemoral venous bypass grafts    KIDNEY TRANSPLANT  2016    KIDNEY TRANSPLANT  2015    KIDNEY TRANSPLANT  2015    PARATHYROIDECTOMY  2006    left parathyroid  implant and parathyroidectomy       Precautions:  Fall Risk, O2 2L, poor balance/safety, hassan    Recommended placement: subacute    Assessment of current deficits     [x] Functional mobility  [x]ADLs  [x] Strength               [x]Cognition     [x] Functional transfers   [x] IADLs         [x] Safety Awareness   [x]Endurance     [x] Fine Coordination              [x] Balance      [] Vision/perception   []Sensation      [x]Gross Motor Coordination  [] ROM  [] Delirium                   [] Motor Control     OT PLAN OF CARE   OT POC based on physician orders, patient diagnosis and results of clinical assessment    Frequency/Duration 1-3 days/wk for 2 weeks PRN   Specific OT Treatment Interventions to include:   * Instruction/training on adapted ADL techniques and AE recommendations to increase functional independence within precautions       * Training on energy conservation strategies, correct breathing pattern and techniques to improve independence/tolerance for self-care routine  * Functional transfer/mobility training/DME recommendations for increased independence, safety, and fall prevention  * Patient/Family education to increase follow through with safety techniques and functional independence  * Recommendation of environmental modifications for increased safety with functional transfers/mobility and ADLs  * Cognitive retraining/development of therapeutic activities to improve problem solving, judgement, memory, and attention for increased safety/participation in ADL/IADL tasks  * Therapeutic exercise to improve motor endurance, ROM, and functional strength for ADLs/functional transfers  * Therapeutic activities to facilitate/challenge dynamic balance, stand tolerance for increased safety and independence with ADLs  * Therapeutic activities to facilitate gross/fine motor skills for increased independence with ADLs  * Neuro-muscular re-education: facilitation of righting/equilibrium reactions, midline orientation, scapular stability/mobility, normalization of muscle tone, and facilitation of volitional active controled movement  * Positioning to improve skin integrity, interaction with environment and functional independence     Recommended Adaptive Equipment/DME:  TBD       Home Living: Admitted from SNF for rehab and a poor historian on eval. Information gathered from chart. Previously, pt lives alone in a apartment 9th, elevator access  with No steps  to enter. Laundry on the 2nd floor with elevator access. Pt cares for mother as well that lives in her own home, but there is a lift to enter. Reports that she has family and friends to assist if needed.     Bathroom setup: tub shower with grab bars, shower chair, standard toilet    DME owned: rollator      Prior Level of Function: indep with ADLs , indep with IADLs; ambulated indep    Driving: yes   Occupation: retired   Enjoys: [place], [place] with mother  Pain Level: none  Cognition: A&O: 1/4-knew she was in the hospital but not what location, confused on situation reporting she was indep at home and had pain, reported the month at \"2122\" and the year as \"2222\";  Follows 1-2 step directions   Memory:  Poor+   Sequencing:  Poor+   Problem solving:  Poor+   Judgement/safety:  Poor    AM-PAC Daily Activity Inpatient   How much help for putting on and taking off regular lower body clothing?: Total  How much help for Bathing?: A Lot  How much help for Toileting?: Total  How much help for putting on and taking off regular upper body clothing?: Total  How much help for taking care of personal grooming?: A Lot  How much help for eating meals?: A Lot  AM-[facility] Inpatient Daily Activity Raw Score: 9  AM-PAC Inpatient ADL T-Scale Score : 25.33  ADL Inpatient CMS 0-100% Score: 79.59  ADL Inpatient CMS G-Code Modifier : CL     Functional Assessment:     Initial Eval Status  Date: 2/25/2022   Treatment Status  Date: STGs = LTGs  Time frame: 10-14 days   Feeding Maximal Assist; Extended time, repositioning for safety/function and education on body mechanics with breakfast from upright sitting in supine. Pt required set up of food items and prep of items on tray. All items placed within reach for increased indep and decreased spillage. Pt was able to use right UE as dominate hand, however unable to coordinate untensils and showed improvement with finger foods. Poor intake and 50% spillage overall. Hand over hand required due to pt spilling a full cup of coffee and dish of applesauce on herself prior to therapist arrival. No coughing for choking hazard was present. Min A   Grooming Maximal Assist with pt able to attempt all parts but overall required dep for hair care and max A for face/hand wash. Moderate Assist    UB Dressing Dependent with gown management from supine  Maximal Assist    LB Dressing Dependent for dinah socks from supine  Maximal Assist    Bathing Maximal Assist with pt able to wash chest and part of L UE from supine  Moderate Assist    Toileting Dependent with hassan management  Maximal Assist    Bed Mobility  Rolling to the left x 3 with mod A and rolling to the right x 2 with max A. Attempted to sit EOB, but unable to complete safely with only A of 1 person with left riki body nonfunctional and pt confusion  Supine to sit: Maximal Assist   Sit to supine: Maximal Assist    Functional Transfers  NT due to pt overall debility, decreased activity tolerance, balance deficits, safety and fall risk.      Maximal Assist x 2   Functional Mobility  NT due to pt overall debility, decreased activity tolerance, balance deficits, safety and fall risk. Not appropriate at time of eval. To re-assess at at later time if appropriate. Balance Sitting:     Static:  NT    Dynamic:NT  Standing: NT  Sitting:     Static:  Poor+    Dynamic:poor   Standing: poor   Activity Tolerance Vitals with activity:2L  96% HR 74; poor+ tolerance with pt confusion and falling asleep with session. Increase sitting tolerance for >10min with stable vital signs for carry over into toileting, functional tranfers and indep in ADLs   Visual/  Perceptual Glasses: not Present; WFL     Reports change in vision since admission: No          NA   Yair UE Strengthening  2/5 generally  3-/5MMT generally for carry over into self care, functional transfers and functional mobility with AD. Hand Dominance  [] Right  [x] Left    AROM (PROM) Strength Additional Info:    RUE  WFL with tactile prompting required. Delayed motor processing and initiation 3/5 Fair  and fair- FMC/dexterity noted during ADL tasks-tremor  Opposition [] Intact [x] Impaired  Finger to nose [] Intact [x] Impaired     LUE <25% function in all planes with resistance to PROM due to confusion. 1+/5 Fair- and poor+ FMC/dexterity noted during ADL tasks  Opposition [] Intact [x] Impaired  Finger to nose [] Intact [x] Impaired     Hearing: WFL   Sensation:  No c/o numbness or tingling   Tone: WFL   Edema: yair UE and LE    Comments: Upon arrival patient sitting in bed with breakfast and coffee spilled all over herself and the bed. Pt required dep A for most UB ADLs and dep A LB ADLs tasks. The biggest barriers reflect that of functional transfers, functional mobility, UB/LB ADLs, cognition, activity tolerance, balance, safety and strengthening. At end of session, patient supine with call light and phone within reach, all lines and tubes intact. Overall patient demonstrated decreased independence and safety during completion of ADL/functional transfer/mobility tasks compared to PLOF.  Nursing updated on pt position and status following OT eval. Pt would benefit from continued skilled OT to increase safety and independence with completion of ADL/IADL tasks for functional independence and quality of life. Treatment: OT treatment provided this date includes:   Instruction, education and training on safe facilitation and adapted techniques for completion of ADLs. These include neuromuscular reeducation to facilitate balance/righting reactions, proper positioning/alignment to improve interaction with environment and overall function and on adapted techniques/work simplification for completion of ADLs. Education provided on hand/feet placement with bed rails and body mechanics for fall prevention. Extended time to complete all tasks, including skilled monitoring of patient's response during treatment session and vital signs. Prior to and at the end of session, environmental modifications / line management completed for patients safety and efficiency of treatment session. See above for further details. Rehab Potential: Fair for established goals     Patient / Family Goal: none reported with pt stating \"just to live here, I guess\"      Patient and/or family were instructed on functional diagnosis, prognosis/goals and OT plan of care. Demonstrated fair- understanding. Eval Complexity: Low  · History: Brief review of medical records and additional review of physical, cognitive, or psychosocial history related to current functional performance  · Exam: 3+ performance deficits  · Assistance/Modification: Max assistance or modifications required to perform tasks. May have comorbidities that affect occupational performance.     Time In: 5625  Time Out: 0925  Total Treatment Time: 27    Min Units   OT Eval Low 97165  x  1   OT Eval Medium 76815      OT Eval High 70885      OT Re-Eval 48117       Therapeutic Ex 89105       Therapeutic Activities 16944  47 1    ADL/Self Care 47455  13 1    Orthotic Management 27147 Manual 76414     Neuro Re-Ed 41820       Non-Billable Time          Evaluation Time additionally includes thorough review of current medical information, gathering information on past medical history/social history and prior level of function, interpretation of standardized testing/informal observation of tasks, assessment of data and development of plan of care and goals.             Hawa Harvey OTR/L; [id number]

## 2022-02-25 NOTE — PROGRESS NOTES
Report received from nightshift RN. Patient resting in bed but arousable to voice and light touch. Oriented to self; disoriented to time, disoriented to place, disoriented to situation, makes random statements that do not fit with questions asked or the conversation. Noted to be extremely weak, struggled to hold head up during assessment. Left side noted to be markedly weaker than right, does not support against gravity, spoke to Dr. Georgina Pillai who stated this is not a new change for her. Skin cool to the touch. Vital signs reviewed. Heart monitor displaying NSR HR 73bpm. SpO2 100% on 3L NC, titrated down to 2L NC, patient remained % at this time. Labs and orders reviewed. Will assume care of patient.

## 2022-02-25 NOTE — CONSULTS
Assessment and Plan  Patient is a 73 y.o. female with the following medical Problems:   1. Acute on chronic hypoxic and hypercapnic respiratory failure  2. Atelectasis  3. Aspiration pneumonitis  4. Rule out PE  5. Morbid obesity with obesity hypoventilation syndrome  6. Obstructive sleep apnea  7. COPD without exacerbation  8. History of kidney transplant  9. CKD  10. Bipolar disorder    Plan of CARE:  1. Supplemental oxygen to keep sat 88 to 94%  2. BiPAP 12/6 with 12 backup rate as needed and nightly  3. Speech therapy evaluation  4. Patient will be started on full dose Eliquis renally dosed at 5 mg twice a day until PE is ruled out. 5.  Ultrasound lower extremities to rule out DVTs  6.  VQ scan to evaluate for PE  7. Incentive spirometer  8. Urine toxicology  9. Avoid benzodiazepine and opioids  10. Aspiration precaution and debilitation at 30 degree  11. She will be started on Zosyn for aspiration pneumonia/pneumonitis. 12.  Urine toxicology    History of Present Illness:   Patient is a poor historian. History is mostly obtained from the chart since the patient is confused. Patient is a 59-year-old woman with above-mentioned medical problems who presented to [facility] emergency room on February 25, 2021 with confusion. Patient was found to be hypoxemic on room air saturating 76%. She was placed on 3 L nasal cannula. Denies fever, chills, rigors, and chest pain.     Past Medical History:  Past Medical History:   Diagnosis Date    Abnormal EKG     left bundle branch block    Acute gout involving toe of right foot 9/3/2020    Acute gout involving toe of right foot 9/3/2020    Acute on chronic combined systolic (congestive) and diastolic (congestive) heart failure (HCC)     Cancer (HCC)     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    Cerebrovascular disease     Clotting disorder ([facility] 1985    thrombophlebitis after c section delivery    Depression     15 years followed by dr Rosita James Hyperlipidemia     Hypertension     Hyperthyroidism     Leg swelling 9/3/2020    Lymphedema of both lower extremities 9/3/2020    Peripheral vascular disease ([facility]     Renal failure 2012    renal transplant    Thrombophlebitis     after c section delivery    Thyroid disease         Family History:   Family History   Problem Relation Age of Onset    Diabetes Mother     Diabetes Father     Dementia Father        Allergies:         Macrodantin [nitrofurantoin macrocrystal] and Sulfa antibiotics    Social history:  Social History     Socioeconomic History    Marital status:      Spouse name: Not on file    Number of children: Not on file    Years of education: Not on file    Highest education level: Not on file   Occupational History    Not on file   Tobacco Use    Smoking status: Former Smoker     Packs/day: 1.00     Years: 20.00     Pack years: 20.00     Quit date: 10/1/2015     Years since quittin.4    Smokeless tobacco: Never Used   Vaping Use    Vaping Use: Never used   Substance and Sexual Activity    Alcohol use: No     Comment: 2 cups coffee per day    Drug use: No    Sexual activity: Not on file   Other Topics Concern    Not on file   Social History Narrative    Not on file     Social Determinants of Health     Financial Resource Strain:     Difficulty of Paying Living Expenses: Not on file   Food Insecurity:     Worried About Running Out of Food in the Last Year: Not on file    [place] of Food in the Last Year: Not on file   Transportation Needs:     Lack of Transportation (Medical): Not on file    Lack of Transportation (Non-Medical):  Not on file   Physical Activity:     Days of Exercise per Week: Not on file    Minutes of Exercise per Session: Not on file   Stress:     Feeling of Stress : Not on file   Social Connections:     Frequency of Communication with Friends and Family: Not on file    Frequency of Social Gatherings with Friends and Family: Not on file    Attends Restorationist Services: Not on file    Active Member of Clubs or Organizations: Not on file    Attends Club or Organization Meetings: Not on file    Marital Status: Not on file   Intimate Partner Violence:     Fear of Current or Ex-Partner: Not on file    Emotionally Abused: Not on file    Physically Abused: Not on file    Sexually Abused: Not on file   Housing Stability:     Unable to Pay for Housing in the Last Year: Not on file    Number of [place] in the Last Year: Not on file    Unstable Housing in the Last Year: Not on file       Current Medications:     Current Facility-Administered Medications:     sodium chloride flush 0.9 % injection 5-40 mL, 5-40 mL, IntraVENous, 2 times per day, Akiko Deng MD, 10 mL at 02/25/22 6809    sodium chloride flush 0.9 % injection 5-40 mL, 5-40 mL, IntraVENous, PRN, Akiko Deng MD    0.9 % sodium chloride infusion, 25 mL, IntraVENous, PRN, Akiko Deng MD    enoxaparin (LOVENOX) injection 40 mg, 40 mg, SubCUTAneous, Daily, Akiko Deng MD, 40 mg at 02/25/22 0530    ondansetron (ZOFRAN-ODT) disintegrating tablet 4 mg, 4 mg, Oral, Q8H PRN **OR** ondansetron (ZOFRAN) injection 4 mg, 4 mg, IntraVENous, Q6H PRN, Nico Ramirez MD    polyethylene glycol (GLYCOLAX) packet 17 g, 17 g, Oral, Daily PRN, Akiko Deng MD    acetaminophen (TYLENOL) tablet 650 mg, 650 mg, Oral, Q6H PRN **OR** acetaminophen (TYLENOL) suppository 650 mg, 650 mg, Rectal, Q6H PRN, Akiko Deng MD    ipratropium-albuterol (DUONEB) nebulizer solution 1 ampule, 1 ampule, Inhalation, 4x daily, Akiko Deng MD, 1 ampule at 02/25/22 1031    allopurinol (ZYLOPRIM) tablet 100 mg, 100 mg, Oral, Daily, Susannah Ramirez MD, 100 mg at 02/25/22 0952    budesonide (PULMICORT) nebulizer suspension 500 mcg, 500 mcg, Nebulization, BID, Skip Del Ramirez MD, 500 mcg at 02/25/22 0705    cloZAPine (CLOZARIL) tablet 50 mg, 50 mg, Oral, Nightly, Heba L MD James    cycloSPORINE (SANDIMMUNE) capsule 100 mg, 100 mg, Oral, BID, Marla Dorado MD, 100 mg at 02/25/22 0651    desvenlafaxine succinate (PRISTIQ) extended release tablet 50 mg, 50 mg, Oral, QPM, Susannah Ramirez MD    docusate sodium (COLACE) capsule 100 mg, 100 mg, Oral, BID, Brenton Ramirez MD, 100 mg at 88/64/94 0428    folic acid (FOLVITE) tablet 1 mg, 1 mg, Oral, Daily, Marla Dorado MD, 1 mg at 02/25/22 1945    levothyroxine (SYNTHROID) tablet 50 mcg, 50 mcg, Oral, Daily, Susannah Ramirez MD    predniSONE (DELTASONE) tablet 5 mg, 5 mg, Oral, Daily, Brenton Ramirez MD, 5 mg at 02/25/22 7122    mycophenolate (CELLCEPT) capsule 1,000 mg, 1,000 mg, Oral, BID, Marla Dorado MD, 1,000 mg at 02/25/22 9681      Review of Systems:   General: denies weight gain, denies loss of appetite, fever, chills, night sweats. HEENT: denies headaches, dizziness, head trauma, visual changes, eye pain, tinnitus, nosebleeds, hoarseness or throat pain    Respiratory: denies chest pain, dyspnea, cough and hemoptysis  Cardiovascular: denies orthopnea, paroxysmal nocturnal dyspnea, leg swelling, and previous heart attack. Gastrointestinal: denies pain, nausea vomiting, diarrhea, constipation, melena or bleeding. Genitourinary: denies hematuria, frequency, urgency or dysuria  Neurology: denies syncope, seizures, paralysis, paraesthesia   Endocrine: denies polyuria, polydipsia, skin or hair changes, and heat or cold intolerance  Musculoskeletal: denies joint pain, swelling, arthritis or myalgia  Hematologic: denies bleeding, adenopathy and easy bruising  Skin: denies rashes and skin discoloration  Psychiatry: denies depression    Physical Exam:   Vital Signs:  BP (!) 112/56   Pulse 73   Temp 96 °F (35.6 °C) (Infrared)   Resp 18   Ht 5' 2\" (1.575 m)   Wt 220 lb (99.8 kg)   SpO2 100%   BMI 40.24 kg/m²     Input/Output:  No intake/output data recorded.     Oxygen requirements: NC      General appearance: Obese, not in pain or distress, in no respiratory distress    HEENT: Atraumatic/normocephalic, EOMI, CJ, pharynx clear, moist mucosa, redness of the uvula appreciated,   Neck: Supple, no jugular venous distension, lymphadenopathy, thyromegaly or carotid bruits  Chest: Decreased breath sounds, no wheezing, no crackles and no tenderness over ribs   Cardiovascular: Normal S1, S2, regular rate and rhythm, no murmur, rub or gallop  Abdomen: Normal sounds present, soft, lax with no tenderness, no hepatosplenomegaly, and no masses  Extremities: No edema. Pulses are equally present. Skin: intact, no rashes   Neurologic: Alert and oriented x 1-2, No focal deficit     Investigations:  Labs, radiological imaging and cardiac work up were reviewed        STAFF PHYSICIAN NOTE OF PERSONAL INVOLVEMENT IN CARE  As the attending physician, I certify that I personally reviewed the patient's history and personally examined the patient to confirm the physical findings described above, and that I reviewed the relevant imaging studies and available reports. I also discussed the differential diagnosis and all of the proposed management plans with the patient and individuals accompanying the patient to this visit. They had the opportunity to ask questions about the proposed management plans and to have those questions answered.       Electronically signed by Anjali Gomse MD on 2/25/2022 at 11:10 AM

## 2022-02-25 NOTE — ED NOTES
Name: Ronni Dakins  : 1955  MRN: [id number]    Date: 2022    Benefits of immediately proceeding with Radiology exam outweigh the risks and therefore the following is being waived:      [] Pregnancy test    [] Protocol for Iodine allergy    [] MRI questionnaire    [x] BUN/Creatinine        DO Mandy Galvan Arm, Arm, DO  22 3721

## 2022-02-25 NOTE — LETTER
[place] [employer] Medicaid  CERTIFICATION OF NECESSITY  FOR NON-EMERGENCY TRANSPORTATION   BY [facility] AMBUL[facility]      Individual Information   1. Name: Cristofer Walsh 2. [place] Medicaid Billing Number:    3. Address: [place] [address]      Transportation Provider Information   4. Provider Name: [facility]   5. [place] Medicaid Provider Number:  National Provider Identifier (NPI):      Certification  7. Criteria:  During transport, this individual requires:  [x] Medical treatment or continuous     supervision by an EMT. [] The administration or regulation of oxygen by another person. [x] Supervised protective restraint. 8. Period Beginning Date: 3/2/2022   9. Length  [x] Not more than 1 day(s)  [] One Year     Additional Information Relevant to Certification   10. Comments or Explanations, If Necessary or Appropriate     CHF, BLLE Lymphedema, falls risk ,PVD     Certifying Practitioner Information   11. Name of Practitioner: Dr. Lennox Lloyd   12. [place] Medicaid Provider Number, If Applicable:  [address] Provider Identifier (NPI):      Signature Information   14. Date of Signature: 3/2/2022 15. Name of Person Signing: Katherine Russell RN   68.  Signature and Professional Designation: Electronically signed by Katherine Russell RN on 3/2/2022 at 1:29 PM       ODM [id number]  Rev. 7/2015

## 2022-02-25 NOTE — ED NOTES
Bed: 01  Expected date:   Expected time:   Means of arrival:   Comments:  Kailash Connors RN  02/25/22 7395

## 2022-02-25 NOTE — PROGRESS NOTES
Physical Therapy    Physical Therapy Initial Evaluation/Plan of Care    Room #:  9448/7670-76  Patient Name: Danya Huff  YOB: 1955  MRN: [id number]    Date of Service: 2/25/2022     Tentative placement recommendation: [facility] with Physical Therapy   Equipment recommendation: Patient has needed equipment       Evaluating Physical Therapist: Bonita To, [address] [facility]  #[id number]      Specific Provider Orders/Date/Referring Provider :  02/25/22 0530   PT evaluation and treat Start: 02/25/22 0530, End: 02/25/22 0530, ONE TIME, Standing Count: 1 Occurrences, R    Order went unreviewed at transfer on Fri Feb 25, 2022 5:31 Chichi Lara MD      Admitting Diagnosis:   Dehydration [E86.0]  Colitis [K52.9]  Hypoxia [R09.02]  Elevated troponin [R77.8]  Acute respiratory failure with hypoxia ([address] [J96.01]  Altered mental status, unspecified altered mental status type [R41.82]    Admitted with    altered mental status at snf with hypoxia  Surgery: none  Visit Diagnoses       Codes    Colitis     K52.9    Dehydration     E86.0    Elevated troponin     R77.8          Patient Active Problem List   Diagnosis    Peripheral vascular disease ([address]    Depression    Clotting disorder ([address]    Abnormal EKG    Cancer ([address]    Over weight    S/p cadaver renal transplant    ESRD (end stage renal disease) ([address]    Non morbid obesity due to excess calories    Hypertension    Leg swelling    Lymphedema of both lower extremities    Acute gout involving toe of right foot    Sepsis secondary to UTI ([address]    Acute kidney injury superimposed on CKD ([address]    Thyroid disease    Acute on chronic combined systolic (congestive) and diastolic (congestive) heart failure ([address]    Sepsis ([address]    Acute renal failure ([address]    CECILY (acute kidney injury) ([address]    Ischemic bowel disease ([address]    Altered mental status    Coma ([address]    Acute respiratory failure with hypoxia ([address]    Hypoxia ASSESSMENT of Current Deficits Patient exhibits decreased strength, balance, endurance, range of motion and coordination impairing functional mobility, transfers, tolerance to activity and participation are barriers to d/c and require skilled intervention during hospital stay to attain pre hospital level of function. Decreased strength, balance and endurance  increases patient's risk for fall. Significant lean to right and poor use of extremities impact safety and need for 2 clinicians for progressing function       PHYSICAL THERAPY  PLAN OF CARE       Physical therapy plan of care is established based on physician order,  patient diagnosis and clinical assessment    Current Treatment Recommendations:    -Bed Mobility: Lower extremity exercises , Upper extremity exercises  and Trunk control activities   -Sitting Balance: Hands on support to maintain midline , Facilitate active trunk muscle engagement  and Facilitate postural control in all planes   -Standing Balance: Perform strengthening exercises in standing to promote motor control with or without upper extremity support  and wt shifting and guided loading onto bilataerl lower ext  -Transfers: Provide instruction on proper hand and foot position for adequate transfer of weight onto lower extremities and use of gait device if needed, Cues for hand placement, technique and safety. Provide stabilization to prevent fall  and Support transfer of weight on to lower extremities  -Endurance: Utilize Supervised activities to increase level of endurance to allow for safe functional mobility including transfers and gait     PT long term treatment goals are located in below grid    Patient and or family understand(s) diagnosis, prognosis, and plan of care. Frequency of treatments: Patient will be seen  daily.          Prior Level of Function: Patient non ambulatory > 4 weeks  Rehab Potential: fair    for baseline    Past medical history:   Past Medical History: Diagnosis Date    Abnormal EKG     left bundle branch block    Acute gout involving toe of right foot 9/3/2020    Acute gout involving toe of right foot 9/3/2020    Acute on chronic combined systolic (congestive) and diastolic (congestive) heart failure (HCC)     Cancer (HCC)     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    Cerebrovascular disease     Clot[facility] disorder ([facility] 1985    thrombophlebitis after c section delivery    Depression     15 years followed by dr Teresa Strong Hyperlipidemia     Hypertension     Hyperthyroidism     Leg swelling 9/3/2020    Lymphedema of both lower extremities 9/3/2020    Peripheral vascular disease ([facility]     Renal failure 11/2012    renal transplant    Thrombophlebitis     after c section delivery    Thyroid disease      Past Surgical History:   Procedure Laterality Date   [address]    one normal delivery    COLECTOMY N/A 1/15/2022    DIAGNOSTIC  LAPAROSCOPY LYSIS OF ADHESIONS performed by Shahid Lowe MD at [address] [place] [address] Drive CATH LAB PROCEDURE  2006    normal coronaries and 1996 normal coronaries    DIALYSIS FISTULA CREATION  march and july 2012    phase one and two patient will start dialysis when needed   [address]    transfemoral venous bypass grafts    KIDNEY TRANSPLANT  2016    KIDNEY TRANSPLANT  2015    KIDNEY TRANSPLANT  2015    PARATHYROIDECTOMY  2006    left parathyroid  implant and parathyroidectomy       SUBJECTIVE:    Precautions:  Up with assistance, falls, alarm and O2 ,  2 Liters of o2 via nasal cannula   Social history: Patient lives in a skilled nursing facility      Equipment owned: unknown,       [address]   How much difficulty turning over in bed?: A Lot  How much difficulty sitting down on / standing up from a chair with arms?: Unable  How much difficulty moving from lying on back to sitting on side of bed?: A Lot  How much help from another person moving to and from a bed to a chair?: Total  How much help from another person needed to walk in hospital room?: Total  How much help from another person for climbing 3-5 steps with a railing?: Total  AM-PAC Inpatient Mobility Raw Score : 8  AM-PAC Inpatient T-Scale Score : 28.52  Mobility Inpatient CMS 0-100% Score: 86.62  Mobility Inpatient CMS G-Code Modifier : CM    Nursing cleared patient for PT evaluation. The admitting diagnosis and active problem list as listed above have been reviewed prior to the initiation of this evaluation. OBJECTIVE;   Initial Evaluation  Date: 2/25/2022 Treatment Date:     Short Term/ Long Term   Goals   Was pt agreeable to Eval/treatment? Yes  To be met in 5 days   Pain level   None reported     Bed Mobility    Rolling: Maximal assist of  2    Supine to sit: Maximal assist of 1    Sit to supine: Maximal assist of 1    Scooting: Dependent assist of 1    Rolling: Moderate assist of 1    Supine to sit: Moderate assist of 1    Sit to supine: Moderate assist of 1    Scooting:  Moderate assist of 1     ROM Within functional limits  with exception of bilateral ankles  Increase range of motion 10% of affected joints    Strength LUE:  1/5   RUE 2/5  RLE:  2/5  LLE:  2/5  Increase strength in affected mm groups by 1/3 grade   Balance Sitting EOB:  poor lateral lean to right with inability to shift to left and sit midline     Sitting EOB:  fair          Patient is Alert & Oriented x person and follows one step directions    Sensation:  not assessed   Edema:  yes bilateral lower extremities and left upper extremity   Endurance: poor      Vitals: 2 liters nasal cannula   Blood Pressure at rest  Blood Pressure during session    Heart Rate at rest   Heart Rate during session     SPO2 at rest 95%  SPO2 during session 95%     Patient education  Patient educated on role of Physical Therapy, risks of immobility, safety and plan of care and  importance of mobility while in hospital      Patient response to education:   Pt verbalized understanding Pt demonstrated skill Pt requires further education in this area   Yes Partial Yes      Treatment:  Patient practiced and was instructed/facilitated in the following treatment: Patient   Sat edge of bed 15 minutes with Moderate assist of 1 to increase dynamic sitting balance and activity tolerance. reaching and wt shifting with mod a , edema reduction exercises     Therapeutic Exercises:  see above  x 10 reps. At end of session, patient in bed with alarm call light and phone within reach,  all lines and tubes intact, nursing notified. Patient would benefit from continued skilled Physical Therapy to improve functional independence and quality of life. Patient's/ family goals   none stated    Time in  65  Time out  1015    Total Treatment Time  0 minutes    Evaluation time includes thorough review of current medical information, gathering information on past medical history/social history and prior level of function, completion of standardized testing/informal observation of tasks, assessment of data, and development of Plan of care and goals.      CPT codes:  Low Complexity PT evaluation (47578)  No treatment billed    Sandy Mcdonald PT

## 2022-02-26 ENCOUNTER — APPOINTMENT (OUTPATIENT)
Dept: INTERVENTIONAL RADIOLOGY/VASCULAR | Age: 67
DRG: 177 | End: 2022-02-26
Payer: MEDICARE

## 2022-02-26 ENCOUNTER — APPOINTMENT (OUTPATIENT)
Dept: GENERAL RADIOLOGY | Age: 67
DRG: 177 | End: 2022-02-26
Payer: MEDICARE

## 2022-02-26 LAB
ABO/RH: NORMAL
ALBUMIN SERPL-MCNC: 2.6 G/DL (ref 3.5–5.2)
ALP BLD-CCNC: 74 U/L (ref 35–104)
ALT SERPL-CCNC: 9 U/L (ref 0–32)
ANION GAP SERPL CALCULATED.3IONS-SCNC: 10 MMOL/L (ref 7–16)
ANISOCYTOSIS: ABNORMAL
ANTIBODY SCREEN: NORMAL
APTT: >240 SEC (ref 24.5–35.1)
AST SERPL-CCNC: 29 U/L (ref 0–31)
BACTERIA: ABNORMAL /HPF
BASOPHILIC STIPPLING: ABNORMAL
BASOPHILS ABSOLUTE: 0.06 E9/L (ref 0–0.2)
BASOPHILS RELATIVE PERCENT: 0.9 % (ref 0–2)
BILIRUB SERPL-MCNC: 0.7 MG/DL (ref 0–1.2)
BILIRUBIN URINE: NEGATIVE
BLOOD, URINE: ABNORMAL
BUN BLDV-MCNC: 30 MG/DL (ref 6–23)
CALCIUM SERPL-MCNC: 9.5 MG/DL (ref 8.6–10.2)
CHLORIDE BLD-SCNC: 103 MMOL/L (ref 98–107)
CLARITY: CLEAR
CO2: 27 MMOL/L (ref 22–29)
COLOR: YELLOW
CREAT SERPL-MCNC: 1.6 MG/DL (ref 0.5–1)
EOSINOPHILS ABSOLUTE: 0 E9/L (ref 0.05–0.5)
EOSINOPHILS RELATIVE PERCENT: 0.9 % (ref 0–6)
EPITHELIAL CELLS, UA: ABNORMAL /HPF
GFR AFRICAN AMERICAN: 39
GFR NON-AFRICAN AMERICAN: 32 ML/MIN/1.73
GLUCOSE BLD-MCNC: 106 MG/DL (ref 74–99)
GLUCOSE URINE: NEGATIVE MG/DL
HCT VFR BLD CALC: 27.6 % (ref 34–48)
HEMOGLOBIN: 7.9 G/DL (ref 11.5–15.5)
HYALINE CASTS: ABNORMAL /LPF (ref 0–2)
HYPOCHROMIA: ABNORMAL
KETONES, URINE: NEGATIVE MG/DL
LEUKOCYTE ESTERASE, URINE: ABNORMAL
LYMPHOCYTES ABSOLUTE: 0.83 E9/L (ref 1.5–4)
LYMPHOCYTES RELATIVE PERCENT: 13 % (ref 20–42)
MCH RBC QN AUTO: 32.2 PG (ref 26–35)
MCHC RBC AUTO-ENTMCNC: 28.6 % (ref 32–34.5)
MCV RBC AUTO: 112.7 FL (ref 80–99.9)
MONOCYTES ABSOLUTE: 0.38 E9/L (ref 0.1–0.95)
MONOCYTES RELATIVE PERCENT: 6.1 % (ref 2–12)
NEUTROPHILS ABSOLUTE: 5.12 E9/L (ref 1.8–7.3)
NEUTROPHILS RELATIVE PERCENT: 80 % (ref 43–80)
NITRITE, URINE: NEGATIVE
OVALOCYTES: ABNORMAL
PDW BLD-RTO: 17.1 FL (ref 11.5–15)
PH UA: 5 (ref 5–9)
PLATELET # BLD: 136 E9/L (ref 130–450)
PMV BLD AUTO: 12.8 FL (ref 7–12)
POIKILOCYTES: ABNORMAL
POLYCHROMASIA: ABNORMAL
POTASSIUM REFLEX MAGNESIUM: 4.7 MMOL/L (ref 3.5–5)
PROTEIN UA: NEGATIVE MG/DL
RBC # BLD: 2.45 E12/L (ref 3.5–5.5)
RBC UA: ABNORMAL /HPF (ref 0–2)
REASON FOR REJECTION: NORMAL
REJECTED TEST: NORMAL
SCHISTOCYTES: ABNORMAL
SODIUM BLD-SCNC: 140 MMOL/L (ref 132–146)
SPECIFIC GRAVITY UA: 1.01 (ref 1–1.03)
TEAR DROP CELLS: ABNORMAL
TOTAL PROTEIN: 5.2 G/DL (ref 6.4–8.3)
UROBILINOGEN, URINE: 0.2 E.U./DL
WBC # BLD: 6.4 E9/L (ref 4.5–11.5)
WBC UA: ABNORMAL /HPF (ref 0–5)

## 2022-02-26 PROCEDURE — 85730 THROMBOPLASTIN TIME PARTIAL: CPT

## 2022-02-26 PROCEDURE — 92611 MOTION FLUOROSCOPY/SWALLOW: CPT

## 2022-02-26 PROCEDURE — C9113 INJ PANTOPRAZOLE SODIUM, VIA: HCPCS | Performed by: INTERNAL MEDICINE

## 2022-02-26 PROCEDURE — P9016 RBC LEUKOCYTES REDUCED: HCPCS

## 2022-02-26 PROCEDURE — 6360000002 HC RX W HCPCS: Performed by: INTERNAL MEDICINE

## 2022-02-26 PROCEDURE — 81001 URINALYSIS AUTO W/SCOPE: CPT

## 2022-02-26 PROCEDURE — 36415 COLL VENOUS BLD VENIPUNCTURE: CPT

## 2022-02-26 PROCEDURE — 80053 COMPREHEN METABOLIC PANEL: CPT

## 2022-02-26 PROCEDURE — 86900 BLOOD TYPING SEROLOGIC ABO: CPT

## 2022-02-26 PROCEDURE — 87088 URINE BACTERIA CULTURE: CPT

## 2022-02-26 PROCEDURE — 06H03DZ INSERTION OF INTRALUMINAL DEVICE INTO INFERIOR VENA CAVA, PERCUTANEOUS APPROACH: ICD-10-PCS | Performed by: INTERNAL MEDICINE

## 2022-02-26 PROCEDURE — 94640 AIRWAY INHALATION TREATMENT: CPT

## 2022-02-26 PROCEDURE — 99233 SBSQ HOSP IP/OBS HIGH 50: CPT | Performed by: INTERNAL MEDICINE

## 2022-02-26 PROCEDURE — 2580000003 HC RX 258: Performed by: NURSE PRACTITIONER

## 2022-02-26 PROCEDURE — 6370000000 HC RX 637 (ALT 250 FOR IP): Performed by: INTERNAL MEDICINE

## 2022-02-26 PROCEDURE — C1769 GUIDE WIRE: HCPCS

## 2022-02-26 PROCEDURE — 74230 X-RAY XM SWLNG FUNCJ C+: CPT

## 2022-02-26 PROCEDURE — 85025 COMPLETE CBC W/AUTO DIFF WBC: CPT

## 2022-02-26 PROCEDURE — 86850 RBC ANTIBODY SCREEN: CPT

## 2022-02-26 PROCEDURE — 2580000003 HC RX 258: Performed by: INTERNAL MEDICINE

## 2022-02-26 PROCEDURE — 94660 CPAP INITIATION&MGMT: CPT

## 2022-02-26 PROCEDURE — 99222 1ST HOSP IP/OBS MODERATE 55: CPT | Performed by: INTERNAL MEDICINE

## 2022-02-26 PROCEDURE — 2700000000 HC OXYGEN THERAPY PER DAY

## 2022-02-26 PROCEDURE — 2060000000 HC ICU INTERMEDIATE R&B

## 2022-02-26 PROCEDURE — 2500000003 HC RX 250 WO HCPCS: Performed by: INTERNAL MEDICINE

## 2022-02-26 PROCEDURE — APPSS30 APP SPLIT SHARED TIME 16-30 MINUTES: Performed by: NURSE PRACTITIONER

## 2022-02-26 PROCEDURE — 86901 BLOOD TYPING SEROLOGIC RH(D): CPT

## 2022-02-26 PROCEDURE — 86923 COMPATIBILITY TEST ELECTRIC: CPT

## 2022-02-26 PROCEDURE — 37191 INS ENDOVAS VENA CAVA FILTR: CPT

## 2022-02-26 RX ORDER — HEPARIN SODIUM (PORCINE) LOCK FLUSH IV SOLN 100 UNIT/ML 100 UNIT/ML
1 SOLUTION INTRAVENOUS PRN
Status: DISCONTINUED | OUTPATIENT
Start: 2022-02-26 | End: 2022-03-02 | Stop reason: HOSPADM

## 2022-02-26 RX ORDER — SODIUM CHLORIDE 0.9 % (FLUSH) 0.9 %
5-40 SYRINGE (ML) INJECTION EVERY 12 HOURS SCHEDULED
Status: DISCONTINUED | OUTPATIENT
Start: 2022-02-26 | End: 2022-02-28

## 2022-02-26 RX ORDER — SODIUM CHLORIDE 0.9 % (FLUSH) 0.9 %
5-40 SYRINGE (ML) INJECTION PRN
Status: DISCONTINUED | OUTPATIENT
Start: 2022-02-26 | End: 2022-03-02 | Stop reason: HOSPADM

## 2022-02-26 RX ORDER — SODIUM CHLORIDE 9 MG/ML
25 INJECTION, SOLUTION INTRAVENOUS PRN
Status: DISCONTINUED | OUTPATIENT
Start: 2022-02-26 | End: 2022-03-02 | Stop reason: HOSPADM

## 2022-02-26 RX ORDER — HEPARIN SODIUM (PORCINE) LOCK FLUSH IV SOLN 100 UNIT/ML 100 UNIT/ML
1 SOLUTION INTRAVENOUS EVERY 12 HOURS SCHEDULED
Status: DISCONTINUED | OUTPATIENT
Start: 2022-02-26 | End: 2022-02-28

## 2022-02-26 RX ORDER — PANTOPRAZOLE SODIUM 40 MG/10ML
40 INJECTION, POWDER, LYOPHILIZED, FOR SOLUTION INTRAVENOUS 2 TIMES DAILY
Status: DISCONTINUED | OUTPATIENT
Start: 2022-02-26 | End: 2022-03-02

## 2022-02-26 RX ORDER — SODIUM CHLORIDE 9 MG/ML
INJECTION, SOLUTION INTRAVENOUS CONTINUOUS
Status: DISCONTINUED | OUTPATIENT
Start: 2022-02-26 | End: 2022-03-01

## 2022-02-26 RX ADMIN — CYCLOSPORINE 100 MG: 25 CAPSULE, GELATIN COATED ORAL at 08:55

## 2022-02-26 RX ADMIN — LEVOTHYROXINE SODIUM 50 MCG: 0.05 TABLET ORAL at 05:21

## 2022-02-26 RX ADMIN — PIPERACILLIN SODIUM AND TAZOBACTAM SODIUM 3375 MG: 3; .375 INJECTION, POWDER, LYOPHILIZED, FOR SOLUTION INTRAVENOUS at 15:05

## 2022-02-26 RX ADMIN — Medication 10 ML: at 21:43

## 2022-02-26 RX ADMIN — SODIUM CHLORIDE 25 ML: 9 INJECTION, SOLUTION INTRAVENOUS at 00:15

## 2022-02-26 RX ADMIN — IPRATROPIUM BROMIDE AND ALBUTEROL SULFATE 1 AMPULE: .5; 2.5 SOLUTION RESPIRATORY (INHALATION) at 13:30

## 2022-02-26 RX ADMIN — Medication 10 ML: at 12:10

## 2022-02-26 RX ADMIN — PIPERACILLIN SODIUM AND TAZOBACTAM SODIUM 3375 MG: 3; .375 INJECTION, POWDER, LYOPHILIZED, FOR SOLUTION INTRAVENOUS at 21:42

## 2022-02-26 RX ADMIN — BARIUM SULFATE 45 G: 0.81 POWDER, FOR SUSPENSION ORAL at 11:11

## 2022-02-26 RX ADMIN — CYCLOSPORINE 100 MG: 25 CAPSULE, GELATIN COATED ORAL at 21:42

## 2022-02-26 RX ADMIN — BARIUM SULFATE 45 G: 0.6 CREAM ORAL at 11:12

## 2022-02-26 RX ADMIN — Medication 10 ML: at 08:57

## 2022-02-26 RX ADMIN — DESVENLAFAXINE SUCCINATE 50 MG: 50 TABLET, FILM COATED, EXTENDED RELEASE ORAL at 18:48

## 2022-02-26 RX ADMIN — BUDESONIDE 500 MCG: 0.5 INHALANT RESPIRATORY (INHALATION) at 18:33

## 2022-02-26 RX ADMIN — SODIUM CHLORIDE: 9 INJECTION, SOLUTION INTRAVENOUS at 12:19

## 2022-02-26 RX ADMIN — SODIUM CHLORIDE 25 ML: 9 INJECTION, SOLUTION INTRAVENOUS at 09:20

## 2022-02-26 RX ADMIN — IPRATROPIUM BROMIDE AND ALBUTEROL SULFATE 1 AMPULE: .5; 2.5 SOLUTION RESPIRATORY (INHALATION) at 06:08

## 2022-02-26 RX ADMIN — IPRATROPIUM BROMIDE AND ALBUTEROL SULFATE 1 AMPULE: .5; 2.5 SOLUTION RESPIRATORY (INHALATION) at 18:33

## 2022-02-26 RX ADMIN — IPRATROPIUM BROMIDE AND ALBUTEROL SULFATE 1 AMPULE: .5; 2.5 SOLUTION RESPIRATORY (INHALATION) at 10:05

## 2022-02-26 RX ADMIN — DOCUSATE SODIUM 100 MG: 100 CAPSULE ORAL at 21:42

## 2022-02-26 RX ADMIN — BARIUM SULFATE 45 ML: 400 SUSPENSION ORAL at 11:11

## 2022-02-26 RX ADMIN — PIPERACILLIN SODIUM AND TAZOBACTAM SODIUM 3375 MG: 3; .375 INJECTION, POWDER, LYOPHILIZED, FOR SOLUTION INTRAVENOUS at 05:21

## 2022-02-26 RX ADMIN — PREDNISONE 5 MG: 5 TABLET ORAL at 08:56

## 2022-02-26 RX ADMIN — PANTOPRAZOLE SODIUM 40 MG: 40 INJECTION, POWDER, FOR SOLUTION INTRAVENOUS at 12:10

## 2022-02-26 RX ADMIN — FOLIC ACID 1 MG: 1 TABLET ORAL at 08:56

## 2022-02-26 RX ADMIN — SODIUM CHLORIDE 25 ML: 9 INJECTION, SOLUTION INTRAVENOUS at 18:49

## 2022-02-26 RX ADMIN — MYCOPHENOLATE MOFETIL 1000 MG: 250 CAPSULE ORAL at 21:42

## 2022-02-26 RX ADMIN — ALLOPURINOL 100 MG: 100 TABLET ORAL at 08:55

## 2022-02-26 RX ADMIN — CLOZAPINE 50 MG: 25 TABLET ORAL at 21:43

## 2022-02-26 RX ADMIN — PANTOPRAZOLE SODIUM 40 MG: 40 INJECTION, POWDER, FOR SOLUTION INTRAVENOUS at 21:42

## 2022-02-26 RX ADMIN — MYCOPHENOLATE MOFETIL 1000 MG: 250 CAPSULE ORAL at 08:56

## 2022-02-26 RX ADMIN — DOCUSATE SODIUM 100 MG: 100 CAPSULE ORAL at 08:56

## 2022-02-26 RX ADMIN — BUDESONIDE 500 MCG: 0.5 INHALANT RESPIRATORY (INHALATION) at 06:08

## 2022-02-26 ASSESSMENT — PAIN SCALES - GENERAL
PAINLEVEL_OUTOF10: 0
PAINLEVEL_OUTOF10: 0

## 2022-02-26 NOTE — PROGRESS NOTES
Nutrition Assessment     Type and Reason for Visit: Initial,Positive Nutrition Screen    Nutrition Recommendations/Plan:  Continue Current Diet. Start Oral Nutrition Supplement Magic cup BID. Nutrition Assessment:  Pt admitted w/ acute respiratory failure. H/o cancer, dysphagia, ESRD s/p renal transplant. SLP orders for dysphagia minced and moist diet w/ nectar thick liquids. Consuming 26-50% on average. Will start ONS to improve intake    Malnutrition Assessment:  Malnutrition Status: Insufficient data    Estimated Daily Nutrient Needs:  Energy (kcal): 9186-7245 (MSJ 1496 x 1.2 SF); Weight Used for Energy Requirements:  Current     Protein (g): 100-110 (2.0-2.2 g/kg IBW); Weight Used for Protein Requirements:  Ideal        Fluid (ml/day): 9444-9512; Weight Used for Fluid Requirements:  1 ml/kcal      Nutrition Related Findings: A&O x3, abd distention,+BS, +/+3 pitting edema BUE/BLE, poor skin tugor, renal labs elevated, dysphagia      Current Nutrition Therapies:    ADULT DIET; Dysphagia - Minced and Moist    Anthropometric Measures:  · Height: 5' 2\" (157.5 cm)  · Current Body Wt: 220 lb (99.8 kg) (2/25 estimated weight, pt out of room during RD assessment)   · BMI: 40.2    Nutrition Diagnosis:   · Inadequate oral intake related to swallowing difficulty as evidenced by swallow study results,intake 26-50%      Nutrition Interventions:   Nutrition Education/Counseling:  No recommendation at this time   Coordination of Nutrition Care:  Continue to monitor while inpatient    Goals:  Consume >50% of meals and ONS       Nutrition Monitoring and Evaluation:   Behavioral-Environmental Outcomes:  None Identified   Food/Nutrient Intake Outcomes:  Food and Nutrient Intake,Supplement Intake  Physical Signs/Symptoms Outcomes:  Biochemical Data,Nutrition Focused Physical Findings,Skin,Weight,Chewing or Swallowing,GI Status,Fluid Status or Edema     Discharge Planning:     Too soon to determine     Electronically signed by Ar Harrison RD, LD on 2/26/22 at 2:26 PM EST    Contact:

## 2022-02-26 NOTE — PROGRESS NOTES
CRITICAL CARE PROGRESS NOTE    73year old person with PMH as described below admitted to hospital for management of     1. Acute on chronic hypoxic and hypercapnic respiratory failure  2. Atelectasis  3. Aspiration pneumonitis  4. Morbid obesity with obesity hypoventilation syndrome  5. Obstructive sleep apnea  6. COPD without exacerbation  7. History of kidney transplant  8. CKD  9. Bipolar disorder     Plan:  --Supplemental oxygen to keep sat >90%, currently on 2L   --Complete 7 days of antibiotics for management of aspiration pneumonia  --BiPAP 12/6 with 12 backup rate as needed and nightly  --Speech therapy evaluation  --Continue therapeutic anticoagulation as she has DVT on left femoral vein; V/Q scan did not confirm pulmonary embolism, currently on heparin infusion   --Incentive spirometer and flutter 10 times/h  --Avoid benzodiazepine and opioids    Rest of supportive care as per primary team    /64   Pulse 94   Temp 96.9 °F (36.1 °C) (Infrared)   Resp 18   Ht 5' 2\" (1.575 m)   Wt 220 lb (99.8 kg)   SpO2 95%   BMI 40.24 kg/m²       The patient was not in her room during my rounds, I will evaluate her tomorrow.         Last 3 CMP:  Recent Labs     02/25/22  0122 02/26/22  0454    140   K 4.2 4.7   CL 98 103   CO2 33* 27   BUN 29* 30*   CREATININE 1.7* 1.6*   GLUCOSE 113* 106*   CALCIUM 10.6* 9.5   PROT 6.7 5.2*   LABALBU 3.5 2.6*   BILITOT 1.2 0.7   ALKPHOS 105* 74   AST 16 29   ALT 10 9     Recent Labs     02/26/22  0454   WBC 6.4   RBC 2.45*   HGB 7.9*   HCT 27.6*   .7*   MCH 32.2   MCHC 28.6*   RDW 17.1*      MPV 12.8*       Recent Labs     02/25/22  0112   BC 24 Hours no growth       Recent Labs     02/25/22  0112   BLOODCULT2 24 Hours no growth       24 HR INTAKE/OUTPUT:      Intake/Output Summary (Last 24 hours) at 2/26/2022 1232  Last data filed at 2/26/2022 1023  Gross per 24 hour   Intake 750 ml   Output 1100 ml   Net -350 ml     MEDICATIONS:   lidocaine  5 mL IntraDERmal Once    sodium chloride flush  5-40 mL IntraVENous 2 times per day    heparin flush  1 mL IntraVENous 2 times per day    pantoprazole  40 mg IntraVENous BID    sodium chloride flush  5-40 mL IntraVENous 2 times per day    ipratropium-albuterol  1 ampule Inhalation 4x daily    allopurinol  100 mg Oral Daily    budesonide  500 mcg Nebulization BID    cloZAPine  50 mg Oral Nightly    cycloSPORINE  100 mg Oral BID    desvenlafaxine succinate  50 mg Oral QPM    docusate sodium  100 mg Oral BID    folic acid  1 mg Oral Daily    levothyroxine  50 mcg Oral Daily    predniSONE  5 mg Oral Daily    mycophenolate  1,000 mg Oral BID    piperacillin-tazobactam  3,375 mg IntraVENous Q8H    sodium chloride  25 mL IntraVENous Q8H      sodium chloride      sodium chloride 75 mL/hr at 02/26/22 1219    sodium chloride      [Held by provider] heparin (PORCINE) Infusion Stopped (02/26/22 1218)     sodium chloride flush, sodium chloride, heparin flush, sodium chloride flush, sodium chloride, ondansetron **OR** ondansetron, polyethylene glycol, acetaminophen **OR** acetaminophen, heparin (porcine), heparin (porcine)    OBJECTIVE:  Vitals:    02/26/22 0845   BP: 106/64   Pulse: 94   Resp: 18   Temp: 96.9 °F (36.1 °C)   SpO2: 95%     FiO2 : 30 %  O2 Flow Rate (L/min): 2 L/min  O2 Device: Nasal cannula        LABS:  WBC   Date Value Ref Range Status   02/26/2022 6.4 4.5 - 11.5 E9/L Final   02/25/2022 6.4 4.5 - 11.5 E9/L Final   02/25/2022 6.8 4.5 - 11.5 E9/L Final     Hemoglobin   Date Value Ref Range Status   02/26/2022 7.9 (L) 11.5 - 15.5 g/dL Final   02/25/2022 7.8 (L) 11.5 - 15.5 g/dL Final   02/25/2022 8.9 (L) 11.5 - 15.5 g/dL Final     Hematocrit   Date Value Ref Range Status   02/26/2022 27.6 (L) 34.0 - 48.0 % Final   02/25/2022 27.2 (L) 34.0 - 48.0 % Final   02/25/2022 31.2 (L) 34.0 - 48.0 % Final     MCV   Date Value Ref Range Status   02/26/2022 112.7 (H) 80.0 - 99.9 fL Final   02/25/2022 111.9 (H) 80.0 - 99.9 fL Final   02/25/2022 111.4 (H) 80.0 - 99.9 fL Final     Platelets   Date Value Ref Range Status   02/26/2022 136 130 - 450 E9/L Final   02/25/2022 141 130 - 450 E9/L Final   02/25/2022 175 130 - 450 E9/L Final     Sodium   Date Value Ref Range Status   02/26/2022 140 132 - 146 mmol/L Final   02/25/2022 140 132 - 146 mmol/L Final   01/24/2022 144 132 - 146 mmol/L Final     Potassium   Date Value Ref Range Status   01/24/2022 3.9 3.5 - 5.0 mmol/L Final   01/23/2022 4.0 3.5 - 5.0 mmol/L Final   01/22/2022 4.2 3.5 - 5.0 mmol/L Final     Potassium reflex Magnesium   Date Value Ref Range Status   02/26/2022 4.7 3.5 - 5.0 mmol/L Final     Comment:     Specimen is moderately Hemolyzed. Result may be artificially increased. 02/25/2022 4.2 3.5 - 5.0 mmol/L Final   01/10/2022 5.5 (H) 3.5 - 5.0 mmol/L Final     Comment:     Specimen is moderately Hemolyzed. Result may be artificially increased.      Chloride   Date Value Ref Range Status   02/26/2022 103 98 - 107 mmol/L Final   02/25/2022 98 98 - 107 mmol/L Final   01/24/2022 109 (H) 98 - 107 mmol/L Final     CO2   Date Value Ref Range Status   02/26/2022 27 22 - 29 mmol/L Final   02/25/2022 33 (H) 22 - 29 mmol/L Final   01/24/2022 26 22 - 29 mmol/L Final     BUN   Date Value Ref Range Status   02/26/2022 30 (H) 6 - 23 mg/dL Final   02/25/2022 29 (H) 6 - 23 mg/dL Final   01/24/2022 57 (H) 6 - 23 mg/dL Final     CREATININE   Date Value Ref Range Status   02/26/2022 1.6 (H) 0.5 - 1.0 mg/dL Final   02/25/2022 1.7 (H) 0.5 - 1.0 mg/dL Final   01/24/2022 1.9 (H) 0.5 - 1.0 mg/dL Final     Glucose   Date Value Ref Range Status   02/26/2022 106 (H) 74 - 99 mg/dL Final   02/25/2022 113 (H) 74 - 99 mg/dL Final   01/24/2022 145 (H) 74 - 99 mg/dL Final     Calcium   Date Value Ref Range Status   02/26/2022 9.5 8.6 - 10.2 mg/dL Final   02/25/2022 10.6 (H) 8.6 - 10.2 mg/dL Final   01/24/2022 9.2 8.6 - 10.2 mg/dL Final     Total Protein   Date Value Ref Range Status   02/26/2022 01/24/2022 2.1 1.6 - 2.6 mg/dL Final   01/23/2022 1.9 1.6 - 2.6 mg/dL Final   01/22/2022 2.1 1.6 - 2.6 mg/dL Final     Phosphorus   Date Value Ref Range Status   01/24/2022 3.6 2.5 - 4.5 mg/dL Final   01/23/2022 2.5 2.5 - 4.5 mg/dL Final   01/22/2022 2.7 2.5 - 4.5 mg/dL Final     Recent Labs     02/25/22  0135   PH 7.385   PO2 70.1*   PCO2 53.8*   HCO3 31.5*   BE 5.5*   O2SAT 93.2       RADIOLOGY:  FL MODIFIED BARIUM SWALLOW W VIDEO   Final Result   Strands it laryngeal penetration with thin liquid barium. No evidence of   aspiration. Please see separate speech pathology report for full discussion of findings   and recommendations. RECOMMENDATIONS:   Unavailable         US DUP LOWER EXTREMITIES BILATERAL VENOUS   Final Result   1. INCOMPLETELY OCCLUSIVE THROMBI in the mid and distal LEFT femoral vein and   within the popliteal vein. They have developed in the interim since the   previous study from 11/17/2021.   2. No evidence of DVT in the right lower extremity. RECOMMENDATIONS:   Unavailable         NM LUNG VENT/PERFUSION (VQ)   Final Result   Negative for pulmonary embolus. XR CHEST 1 VIEW   Final Result   1. Mild cardiomegaly. There is no evidence of failure or pneumonia. CT HEAD WO CONTRAST   Final Result   No acute intracranial abnormality. MRI would be useful if symptoms persist.      Inflammatory changes of left greater than right mastoid air cells and left   sphenoid sinus similar to prior MRI. RECOMMENDATIONS:   Unavailable         CTA PULMONARY W CONTRAST   Final Result   Allowing for patient motion artifact, no identified pulmonary embolism. Significant improvement in aeration of the lungs with some residual areas of   presumed atelectasis in the right greater than left base. Cardiomegaly. RECOMMENDATIONS:   Unavailable         CT ABDOMEN PELVIS W IV CONTRAST Additional Contrast? None   Final Result   Thickening of the proximal ascending colon. Given the short-term change,   this is favored to represent a localized area of colitis likely infectious or   inflammatory. Neoplasia thought less likely but follow-up to resolution   would be recommended. Moderate stool in the distal colon. Minimal distention of several proximal small bowel loops likely incidental or   due to mild enteritis. Small hiatal hernia. Other chronic appearing findings. RECOMMENDATIONS:   Unavailable         IR INTERVENTIONAL RADIOLOGY PROCEDURE REQUEST    (Results Pending)       PROBLEM LIST:  Principal Problem:    Acute respiratory failure with hypoxia (HCC)  Active Problems:    Hypoxia  Resolved Problems:    * No resolved hospital problems.  *        Wes Borja MD  Pulmonary and Critical Care Medicine

## 2022-02-26 NOTE — CONSULTS
CONSULT  [facility]  Dr. Brittany Contreras M.D.,  Dr. Mehrdad Potter M.D.,  Dr. Lo Zimmerman D.O.,  Dr. Niharika Carrillo D.O. ,  Dr. Zia Pacheco M.D.,  Dr. Pastor Cahce M.D.  Case Hinton  73 y.o.  female        Re: Colitis  Requesting physician: Dr Latasha Feng  Date: 2/26/2022         HPI: 81-year-old female patient presenting to the hospital for AMS and respiratory failure with Sat 76% RA. Patient has been treated recently for acute on chronic renal failure with h/o renal transplant, sepsis, pneumonia and respiratory failure requiring continuous noninvasive positive pressure ventilation. Patient has additional past medical history of hypertension, hyperlipidemia, COPD, chronic renal failure, UTI, CVA, depression, hypothyroidism, lymphedema. Pt on BIPAP/CPAP machine, appears lethargic this am.  She does talk to me but seems confused still. NAD. No outward bleeding reported. No information is available in the electronic medical records regarding previous endoscopies. Pt tells me she thinks she had a colonoscopy but not sure on timing or findings. Pt previously in January 2022 had surgical intervention for abnormal CT scan revealing bowel pneumatosis. Patient underwent  laparoscopic procedure revealing significant amount of intra-abdominal adhesions with lysis of adhesions. Pt now has CT with question of ascending colon colitis vs mass. Moderate stool in colon. Small hiatal hernia. Also has 1.4 cm pancreatic lesion and liver cysts. US shows LLE DVT.   CTA and V/Q scan negative for PE.  NO h/o of IVC filter reported.        Information sources:   -medical record  -health care team     PMHx:  Past Medical History[]Expand by Default        Past Medical History:   Diagnosis Date    Abnormal EKG       left bundle branch block    Acute gout involving toe of right foot 9/3/2020    Acute gout involving toe of right foot 9/3/2020    Acute on chronic combined systolic (congestive) and diastolic (congestive) heart failure (HCC)      Cancer (HCC)       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    Cerebrovascular disease      Clotting disorder ([facility] [address] 1985     thrombophlebitis after c section delivery    Depression       15 years followed by dr Rand Canales Hyperlipidemia      Hypertension      Hyperthyroidism      Leg swelling 9/3/2020    Lymphedema of both lower extremities 9/3/2020    Peripheral vascular disease ([facility] [address]      Renal failure 2012     renal transplant    Thrombophlebitis       after c section delivery    Thyroid disease              PSHx:  Past Surgical History[]Expand by Default         Past Surgical History:   Procedure Laterality Date     SECTION   1985     one normal delivery    COLECTOMY N/A 1/15/2022     DIAGNOSTIC  LAPAROSCOPY LYSIS OF ADHESIONS [facility] PROCEDURE        normal coronaries and  normal coronaries    DIALYSIS FISTULA CREATION   march and 2012     phase one and two patient will start dialysis when needed   [facility]     transfemoral venous bypass grafts    KIDNEY TRANSPLANT   2016    KIDNEY TRANSPLANT   2015    KIDNEY TRANSPLANT   2015    PARATHYROIDECTOMY   2006     left parathyroid  implant and parathyroidectomy            Meds:  Current Facility-Administered Medications[]Expand by Default             Current Facility-Administered Medications   Medication Dose Route Frequency Provider Last Rate Last Admin    0.9 % sodium chloride infusion   IntraVENous Continuous Rafa Wilver Renteria MD 50 mL/hr at 22 0336 New Bag at 22 0336    metoprolol tartrate (LOPRESSOR) tablet 50 mg  50 mg Oral BID Susannah Ramirez MD   50 mg at 22 1221    docusate (COLACE) 50 MG/5ML liquid 100 mg  100 mg Oral BID Bienvenidodra Christopher Ramirez MD   100 mg at 22 1550    mycophenolate (CELLCEPT) 200 MG/ML suspension 500 mg  500 mg Oral BID Denise Travis MD   500 mg at 01/17/22 1221    metronidazole (FLAGYL) 500 mg in NaCl 100 mL IVPB premix  500 mg IntraVENous Q8H Kaley Dang MD   Stopped at 01/17/22 0650    linezolid (ZYVOX) IVPB 600 mg  600 mg IntraVENous Q12H Kaley Dang MD   Stopped at 01/17/22 0828    heparin (porcine) injection 5,000 Units  5,000 Units SubCUTAneous BID Paige Farrell MD   5,000 Units at 01/16/22 2159    ondansetron (ZOFRAN-ODT) disintegrating tablet 4 mg  4 mg Oral Q8H PRN Paige Farrell MD   4 mg at 01/11/22 1806     Or    ondansetron (ZOFRAN) injection 4 mg  4 mg IntraVENous Q6H PRN Susannah Ramirez MD        polyethylene glycol (GLYCOLAX) packet 17 g  17 g Oral Daily PRN Paige Farrell MD   17 g at 01/13/22 1611    acetaminophen (TYLENOL) tablet 650 mg  650 mg Oral Q6H PRN Paige Farrell MD   650 mg at 01/11/22 1819     Or    acetaminophen (TYLENOL) suppository 650 mg  650 mg Rectal Q6H PRN Trista Ramirez MD        predniSONE (DELTASONE) tablet 5 mg  5 mg Oral Daily Denise Travis MD   5 mg at 01/17/22 1221    sodium chloride flush 0.9 % injection 5-40 mL  5-40 mL IntraVENous PRN Trista Ramirez MD   10 mL at 01/16/22 2157    0.9 % sodium chloride infusion  25 mL IntraVENous PRN Trista Ramirez  mL/hr at 01/16/22 0035 25 mL at 01/16/22 0035    heparin flush 100 UNIT/ML injection 100 Units  1 mL IntraVENous 2 times per day Paige Farrell MD   100 Units at 01/17/22 1222    heparin flush 100 UNIT/ML injection 100 Units  1 mL IntraCATHeter PRN Trista Ramirez MD        cefepime (MAXIPIME) 2000 mg IVPB extended (mini-bag)  2,000 mg IntraVENous Q24H Kaley Dang MD   Stopped at 01/17/22 0157    allopurinol (ZYLOPRIM) tablet 100 mg  100 mg Oral Daily Susannah Ramirez MD   100 mg at 01/17/22 1221    budesonide (PULMICORT) nebulizer suspension 500 mcg  500 mcg Nebulization BID Paige Farrell MD   500 mcg at 01/17/22 0516    desvenlafaxine succinate (PRISTIQ) extended release tablet 50 mg  50 mg Oral QPM Susannah Ramirez MD   50 mg at 81/15/90 3465    folic acid (FOLVITE) tablet 1 mg  1 mg Oral Daily Susannah Ramirez MD   1 mg at 22 1221    ipratropium-albuterol (DUONEB) nebulizer solution 3 mL  3 mL Inhalation Q6H PRN Tama Goldberg, MD   3 mL at 22 0622    levothyroxine (SYNTHROID) tablet 50 mcg  50 mcg Oral Daily Susannah Ramirez MD   50 mcg at 22 1220    atorvastatin (LIPITOR) tablet 10 mg  10 mg Oral Daily Susannah Ramirez MD   10 mg at 22 1221    ondansetron (ZOFRAN-ODT) disintegrating tablet 4 mg  4 mg Oral Once María Billy MD                SocHx:  Social History   []Expand by Default            Socioeconomic History    Marital status:        Spouse name: Not on file    Number of children: Not on file    Years of education: Not on file    Highest education level: Not on file   Occupational History    Not on file   Tobacco Use    Smoking status: Former Smoker       Packs/day: 1.00       Years: 20.00       Pack years: 20.00       Quit date: 10/1/2015       Years since quittin.3    Smokeless tobacco: Never Used   Vaping Use    Vaping Use: Never used   Substance and Sexual Activity    Alcohol use: No       Comment: 2 cups coffee per day    Drug use: No    Sexual activity: Not on file   Other Topics Concern    Not on file   Social History Narrative    Not on file      Social Determinants of Health          Financial Resource Strain:     Difficulty of Paying Living Expenses: Not on file   Food Insecurity:     Worried About Running Out of Food in the Last Year: Not on file    Vannessa of Food in the Last Year: Not on file   Transportation Needs:     Lack of Transportation (Medical): Not on file    Lack of Transportation (Non-Medical):  Not on file   Physical Activity:     Days of Exercise per Week: Not on file    Minutes of Exercise per Session: Not on file   Stress:     Feeling of Stress : Not on file   Social Connections:     Frequency of Communication with Friends and Family: Not on file    Frequency of Social Gatherings with Friends and Family: Not on file    Attends [employer]ism Services: Not on file    Active Member of Clubs or Organizations: Not on file    Attends Club or Organization Meetings: Not on file    Marital Status: Not on file   Intimate Partner Violence:     Fear of Current or Ex-Partner: Not on file    Emotionally Abused: Not on file    Physically Abused: Not on file    Sexually Abused: Not on file   Housing Stability:     Unable to Pay for Housing in the Last Year: Not on file    Number of [place] in the Last Year: Not on file    Unstable Housing in the Last Year: Not on file            FamHx:  Family History[]Expand by Default         Family History   Problem Relation Age of Onset    Diabetes Mother      Diabetes Father      Dementia Father              Allergy:       Allergies   Allergen Reactions    Macrodantin [Nitrofurantoin Macrocrystal]      Sulfa Antibiotics              ROS: As described in the HPI and in addition is negative upon detailed review of systems or unobtainable unless otherwise stated in this dictation.     PE:    96.9 / 18 / 94 / 106/64 / 95% on 2 L NC     Gen. : Morbidly obese  female. Lethargic  Head: Atraumatic/normocephalic  Eyes: Anicteric sclera  Neck: Supple with trachea midline  Chest:NIPPV/symmetrical  Cor: Appears regular with distant heart sounds  Abd.: Obese. Distended. Bowel sounds hypoactive. Surgical incision consistent with laparoscopic procedure as well as old/healed surgical scars noted  Extr.:  BLE 2+ edema  Muscles: Decreased tone and bulk throughout            ASSESSMENT/PLAN:  1. Colitis on CT -- new findings, had recent pneumotosis and surgery 1/22. Moderate stool. Stool studies ordered and pt on antibiotics. Pt not a good candidate at this time given respiratory status and new DVT in LLE.     2.   Anemia  Hg baseline has been 10-11 range. Now lower in 7-8 range. No gross bleeding reported but monitor closely with serial Hg and 2 units of PRBC on hold  Pt thinks had colonoscopy in remote past but no record  Pt at increased risk of respiratory failure for any procedure at this time and has significantly elevated troponin and BNP and would cardiac clearance prior to procedure. Pt on heparin drip for DVT, this would need held for any procedure. May need IVC filter and consider vascular consult. 3. 1.4cm pancreatic lesion -- stable since 11/2021. Consider triphasic CT or MRI when in better breathing condition. Will check CA 19-9.      4.  Liver cysts -- likely benign but will plan triple phase CT or MRI per above when pt condition improved. Will check AFP.     5.  Respiratory failure  -Patient requiring noninvasive positive pressure ventilation  -Per admitting/pulmonology     6. history of renal transplant  -Per admitting/nephrology     7.  Multiple comorbidities -COPD, HTN, HLD, BPD, COPD, CHF, hypothyroidism, etc.  -Per admitting/pertinent consultants    8.   On Pulmonology note question of aspiration PNA so will check Modified barium swallow test as well.     Thank you for the opportunity see this patient in consultation    Maria Elena Sorenson D.O.  2/26/22  [id number]

## 2022-02-26 NOTE — PROGRESS NOTES
Patient came down to specials procedures for IVC filter placement. Instructions given and questions answered. Patient confused, son Kajal Moon called and instructions given to him and questions were answered  Consent signed. Patient alert and disoriented, respirations even, easy, unlabored. Procedure explained to patient by Dr. Nishi Adams, questions were answered. Patient positioned and prepped on table, secured with strap. Emotional support given. no sedation medication was given    1418  start procedure /58 101 18 100% on 4 liters nasal canula    1430 end procedure /56 102 15 100% on 4 liters nasal canula     Patient tolerated procedure. Pressure held for 5 minutes to right groin. Dry sterile dressing of folded 4 x 4 tegaderm applied to right groin, CDI. Educated patient to keep head down and right leg straight and flat for 4 hours. Patient voiced understanding but patient is confused. Vital signs stable.        Nurse to nurse called to floor spoke with Tennis Square RN    Patient transported back to floor

## 2022-02-26 NOTE — PROGRESS NOTES
02/26/22 0609   NIV Type   NIV Started/Stopped On   Equipment Type V60   Mode Bilevel   Mask Type Full face mask   Mask Size Small   Settings/Measurements   IPAP 15 cmH20   CPAP/EPAP 5 cmH2O   Rate Ordered 12   Resp 18   Insp Rise Time (%) 3 %   FiO2  30 %   I Time/ I Time % 0.9 s   Vt Exhaled 486 mL   Minute Volume 7.4 Liters   Mask Leak (lpm) 22 lpm   Date: 2/26/2022    Time: 6:10 AM    Patient Placed On BIPAP/CPAP/ Non-Invasive Ventilation? Yes    If no must comment. Facial area red/color change? No           If YES are Blister/Lesion present? No   If yes must notify nursing staff  BIPAP/CPAP skin barrier? Yes    Skin barrier type:mepilexlite       Comments:         Tacos Robins RCP

## 2022-02-26 NOTE — PROGRESS NOTES
Message left with Dr. Liset Izaguirre answering service regarding pts aPTT of >240. Heparin was stopped at this time.

## 2022-02-26 NOTE — PROGRESS NOTES
Perfect served for placement of right arm midline, Left arm old AV fistula, edematous and seeping. Right arm examined with ultrasound, noted small basilic and brachial almost cont length of arm. Due to catheter to vein ratio unable to attempt midline. Vein measurement 1.5-2.0 mm, recommended 2.75-3mm vein for 4.5 F catheter.   RN aware

## 2022-02-26 NOTE — H&P
[address]                              HISTORY AND PHYSICAL    PATIENT NAME: Carolann Friedman              :        1955  MED REC NO:   [id number]                            ROOM:       6270  ACCOUNT NO:   [de-identified]                           ADMIT DATE: 2022  PROVIDER:     Marcelo Hamilton MD    CHIEF COMPLAINT:  Low oxygen. HISTORY OF PRESENT ILLNESS:  This is a 43-year-old white lady with  significant past medical history of chronic renal failure, status post  renal transplant in [facility] in , bipolar disorder,  hypertension, hyperlipidemia, hypothyroidism, paroxysmal atrial  fibrillation that started on last admission. The patient was in rehab  facility and she was brought to the emergency room because of low oxygen  and her oxygenation in the emergency room was in the mid 70s and she was  placed on nasal cannula and her oxygen went up in the 90s and she did  have ABGs in the emergency room which showed pCO2 of 53, pO2 of 70,  bicarb of 31, and pH of 7.38. The patient also had CAT scan of the head  and was negative for any acute abnormality and she also has a CTA of the  chest which did not reveal any PE or significant pneumonia. The CT of  the abdomen and pelvis showed that she had moderate stool in the distal  colon with thickening in the proximal ascending colon. The patient was  admitted in the hospital a couple of months ago in November because of  urinary tract infection and sepsis and also because she had a COVID  pneumonia. She was unresponsive for almost 10 days, though she had some  CO2 narcosis and she was not intubated, but she was started on tube  feedings. Eventually, she regained consciousness and was transferred to  [facility] for further evaluation. Apparently, she was  admitted there and history there is unavailable.   I am trying to get the  records and she was sent to rehab facility afterwards. During her  hospitalization at last admission, she was treated for her UTI which  showed Enterococcus with Zyvox and Zosyn. Eventually, the Zyvox was  discontinued due to thrombocytopenia. She stayed on daptomycin and  Zosyn. She was also placed on acyclovir IV for possible viral  meningitis, but her herpes simplex IgM was negative, so that was  discontinued on last admission. PAST SURGICAL HISTORY:   and dialysis catheter in , kidney  transplant in 2015 at [facility], and parathyroidectomy. ALLERGIES:  She is allergic to [facility] and SULFA. SOCIAL HISTORY:  She quit smoking about 7 years ago. She used to smoke  20 packs a year for many years. Does not drink any alcohol or use any  drugs. She used to live by herself until yesterday, was discharged from  [facility] last month here to a rehab facility. FAMILY HISTORY:  Her dad had dementia and diabetes. Her mom also had  diabetes. MEDICATIONS:  Eliquis 5 mg twice a day, Lipitor 10 mg daily, lisinopril  5 mg daily, Lyrica 75 mg daily, Toprol-XL 50 daily, Pristiq 50 mg daily,  Clozaril 50 mg daily, cyclosporin 100 mg twice a day, prednisone 5 mg  daily, folic acid 1 mg daily, CellCept 1000 mg twice a day, Colace 100  mg twice a day, Synthroid 50 mcg daily, DuoNeb nebulizer every six hours  as needed, Pulmicort 500 mcg twice a day via nebulizer, simvastatin 20  mg daily. PHYSICAL EXAMINATION:  GENERAL:  The patient is lying in bed, in no acute distress. Though she  answers to the questions, but she is confused. She is oriented to  person and place. VITAL SIGNS:  Blood pressure 139/74, pulse 82, respirations 18,  temperature 97.9, oxygenation 98% on 1 L. HEENT:  Pupils equal, regular, reactive to light. LUNGS:  Diminished in the bases. HEART:  Regular rate and rhythm. No murmur, rub or gallop. ABDOMEN:  Soft, nontender, mildly distended. Positive bowel sounds. EXTREMITIES:  No edema. NEUROLOGICAL:  She is confused, had decreased muscle strength in the  left upper extremity about 3/5, compared to 4-5/5 on the right. The  patient on previous admission had weakness on the left side, but she was  weak allover. Her MRI was negative at that point on last admission. ASSESSMENT AND PLAN:  1. Respiratory failure of unknown etiology, could be secondary to an  obstructive sleep apnea. We will monitor the patient closely. We will  consult Pulmonology. We will test her for COVID again. 2.  Acute on chronic renal failure. Her baseline between 1.2 to 1.5,  but it sounds only 1.7, which is better than the last admission. We  will monitor her renal function closely. 3.  Anemia of chronic disease secondary to her renal failure and  recurrent infections. 4.  History of renal transplant in 2015 at [facility] and she  was immunocompromized on prednisone, CellCept, and cyclosporin. 5.  History of colonic distention and colitis. On last admission, she  did have laparoscopic surgery by Dr. Shannan Griffin on 01/15/2022, which did not  reveal any ischemic bowel or problem with her bowel. We will place her  on antibiotic for colitis and consult GI. 6.  As per last admission, a new onset paroxysmal atrial fibrillation. She was placed on Eliquis at that time. She had a 2D echo also with  ejection fraction of 60% to 65%. 7.  Hypothyroidism. We will resume her Synthroid. 8.  COPD. We will resume her DuoNeb nebulizer and Pulmicort inhaler. 9.  Bipolar disorder, on Pristiq and Clozaril. 10.  Delirium and confusion secondary to above. We will monitor her  mental status closely.         Felix Carlos MD    D: 02/25/2022 16:39:06       T: 02/25/2022 19:52:04     [email or web address]  Job#: [id number]     Doc#: [id number]    CC:

## 2022-02-26 NOTE — PROGRESS NOTES
[address] [place], [place] [address]ist   Progress Note    Admitting Date and Time: 2/25/2022  1:08 AM  Admit Dx: Dehydration [E86.0]  Colitis [K52.9]  Hypoxia [R09.02]  Elevated troponin [R77.8]  Acute respiratory failure with hypoxia ([address] [J96.01]  Altered mental status, unspecified altered mental status type [R41.82]    Subjective:    Patient is alert and oriented currently with periods of confusion. She has DVTs  in left femoral and left popliteal veins. She has been on a Heparin drip but Hgb is now 7.9. Heparin drip was stopped and IR was consulted for IVC filter. ROS: denies fever, chills, cp, sob, n/v, HA unless stated above.      lidocaine  5 mL IntraDERmal Once    sodium chloride flush  5-40 mL IntraVENous 2 times per day    heparin flush  1 mL IntraVENous 2 times per day    pantoprazole  40 mg IntraVENous BID    sodium chloride flush  5-40 mL IntraVENous 2 times per day    ipratropium-albuterol  1 ampule Inhalation 4x daily    allopurinol  100 mg Oral Daily    budesonide  500 mcg Nebulization BID    cloZAPine  50 mg Oral Nightly    cycloSPORINE  100 mg Oral BID    desvenlafaxine succinate  50 mg Oral QPM    docusate sodium  100 mg Oral BID    folic acid  1 mg Oral Daily    levothyroxine  50 mcg Oral Daily    predniSONE  5 mg Oral Daily    mycophenolate  1,000 mg Oral BID    piperacillin-tazobactam  3,375 mg IntraVENous Q8H    sodium chloride  25 mL IntraVENous Q8H     sodium chloride flush, 5-40 mL, PRN  sodium chloride, 25 mL, PRN  heparin flush, 1 mL, PRN  sodium chloride flush, 5-40 mL, PRN  sodium chloride, 25 mL, PRN  ondansetron, 4 mg, Q8H PRN   Or  ondansetron, 4 mg, Q6H PRN  polyethylene glycol, 17 g, Daily PRN  acetaminophen, 650 mg, Q6H PRN   Or  acetaminophen, 650 mg, Q6H PRN  heparin (porcine), 80 Units/kg, PRN  heparin (porcine), 40 Units/kg, PRN         Objective:    /64   Pulse 94   Temp 96.9 °F (36.1 °C) (Infrared)   Resp 18   Ht 5' 2\" (1.575 m)   Wt Result   No acute intracranial abnormality. MRI would be useful if symptoms persist.      Inflammatory changes of left greater than right mastoid air cells and left   sphenoid sinus similar to prior MRI. RECOMMENDATIONS:   Unavailable         CTA PULMONARY W CONTRAST   Final Result   Allowing for patient motion artifact, no identified pulmonary embolism. Significant improvement in aeration of the lungs with some residual areas of   presumed atelectasis in the right greater than left base. Cardiomegaly. RECOMMENDATIONS:   Unavailable         CT ABDOMEN PELVIS W IV CONTRAST Additional Contrast? None   Final Result   Thickening of the proximal ascending colon. Given the short-term change,   this is favored to represent a localized area of colitis likely infectious or   inflammatory. Neoplasia thought less likely but follow-up to resolution   would be recommended. Moderate stool in the distal colon. Minimal distention of several proximal small bowel loops likely incidental or   due to mild enteritis. Small hiatal hernia. Other chronic appearing findings. RECOMMENDATIONS:   Unavailable         FL MODIFIED BARIUM SWALLOW W VIDEO    (Results Pending)       Assessment:  Principal Problem:    Acute respiratory failure with hypoxia (HCC)  Active Problems:    Hypoxia  Resolved Problems:    * No resolved hospital problems. *      Plan:  1. Acute respiratory failure with hypoxia:  Pulse ox was 76% on room air upon arrival. CTA of the chest showed no large or cental embolism but limited due to patient motion artifact. VQ scan was negative for PE but showed small infiltrate vs atelectasis. Respiratory film array was negative. Continue supplemental oxygen and wean as tolerated. Bipap as needed. Likely secondary to aspiration pneumonitis. 2. Acute on chronic renal failure: Creatinine was 1.7 on admission and trended down to 1.6.  She had a CTA yesterday and received IV fluid bolus.  She is for an insertion of an IVC filter and will receive contrast.  Start continuous IV fluids and consult Nephrology. 3. Anemia:  GI following. Hgb was 8.9 on admission and was previously 10.7 last month. Hgb is 7.9 today. Cardiology consulted for cardiac clearance for GI procedure. 4. DVT:  Incompletely occlusive thrombi in the mid and distal left femoral vein and within the popliteal vein. Was started on a Heparin drip, however due to concern for bleeding a IVC filter to be inserted by IR. 5. Colitis:  Recent laparoscopic surgery with lysis of adhesions with Dr. Niyah Madrid on 1/15/22 that did not reveal any ischemic bowel. Continue Flagyl, Zosyn and Cefepime. 6. Paroxysmal atrial fibrillation:  Home Eliquis is on hold. Heparin drip was infusing but placed on hold due to concern for bleeding. Currently rate controlled an in sinus rhythm. 7. Elevated high sensitivity troponin:  Moderate to severe cardiomegaly on CTA of the chest. High sensitivity levels: 191->176. Last Echo on 1/19/22 with an EF of 55-11%, Stage I diastolic dysfunction. 8. COPD:  Continue aerosols. 9. Bipolar disorder: Continue Pristiq and Clozaril. 10. Pancreatic lesion and Hepatic cysts:  GI following. Pancreatic lesion is 1.4 cm and has been present since 11/17/21Triphasic CT or MRI when condition improves. 11. Aspiration pneumonitis: S/p barium swallow today with recommendations of minced and moist consistency solids and nectar thick liquids. Continue antibiotics. 12. History of renal transplant: Continue cyclosporine, cellcept and prednisone. 13. Hypothyroidism: Continue Synthroid. 14. Delirium and confusion: Secondary to above. Improving. CT of the head with no acute intracranial abnormality. 15. Obstructive sleep apnea:  Continue Bipap at HS and prn.    16. Patient's concern for UTI:  Patient was concerned about having a possible UTI but urinalysis sent on 2/25 with rare bacteria, 1-3 WBCs, no leukocytes and negative nitrites. She was treated last month for an Enterococcus faecalis UTI. Will repeat urinalysis with culture. Currently on antibiotics for colitis and aspiration pneumonitis. NOTE: This report was transcribed using voice recognition software. Every effort was made to ensure accuracy; however, inadvertent computerized transcription errors may be present.      Electronically signed by KATINA Solomon CNP on 2/26/2022 at 11:59 AM

## 2022-02-26 NOTE — CONSULTS
INPATIENT CARDIOLOGY CONSULT    Name: Phillip Hernandez    Age: 73 y.o. Date of Admission: 2/25/2022  1:08 AM    Date of Service: 2/26/2022    Reason for Consultation: Preoperative cardiovascular evaluation prior to EGD for evaluation of anemia and colitis    Chief Complaint   Patient presents with    Altered Mental Status     hx of bipolar, Hx liver failure/renal failure, No history of AMS, no HX of dialysis, was hypoxic at [facility] 76% on room air was placed on 3L NC 93%. Referring Physician: Charlie Duffy MD    History of Present Illness: 29-year-old female with history of prior kidney transplant, recent history of pneumatosis of the colon status post abdominal surgery, history of sinus tachycardia, heart failure with preserved ejection fraction, hypertension, bipolar disorder, COPD, hypothyroidism, obesity, depression, and peripheral vascular disease status post femoral bypass in the past as well as chronic left bundle branch block, hyperlipidemia, thrombocytopenia, and history of DVT as well as paroxysmal atrial fibrillation who is hospitalized for acute hypoxic respiratory failure, acute on chronic renal failure and anemia and cardiology consulted for cardiology clearance for EGD procedure.         Review of Systems:   Cardiac: As per HPI  General: Denies fever or chills  Pulmonary: As per HPI  HEENT: Denies runny nose  GI: No complaints  : No complaints  Endocrine: Denies night sweats  Musculoskeletal: No complaints  Skin: Dry skin  Neuro: No complaints  Psych: Denies depression    Past Medical History:  Past Medical History:   Diagnosis Date    Abnormal EKG     left bundle branch block    Acute gout involving toe of right foot 9/3/2020    Acute gout involving toe of right foot 9/3/2020    Acute on chronic combined systolic (congestive) and diastolic (congestive) heart failure (HCC)     Cancer (HCC)     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    Cerebrovascular disease     Clotting disorder ([facility] 1985    thrombophlebitis after c section delivery    Depression     15 years followed by dr Teri Martines Hyperlipidemia     Hypertension     Hyperthyroidism     Leg swelling 9/3/2020    Lymphedema of both lower extremities 9/3/2020    Peripheral vascular disease ([facility]     Renal failure 2012    renal transplant    Thrombophlebitis     after c section delivery    Thyroid disease        Past Surgical History:  Past Surgical History:   Procedure Laterality Date   [address]    one normal delivery    COLECTOMY N/A 1/15/2022    DIAGNOSTIC  LAPAROSCOPY LYSIS OF ADHESIONS performed by Perico Petty MD at [address] CATH LAB PROCEDURE      normal coronaries and  normal coronaries    DIALYSIS FISTULA CREATION  march and 2012    phase one and two patient will start dialysis when needed   [address]    transfemoral venous bypass grafts    IR IVC FILTER PLACEMENT W IMAGING  2022    IR IVC FILTER PLACEMENT W IMAGING 2022 [facility]    KIDNEY TRANSPLANT  2016    KIDNEY TRANSPLANT  2015    KIDNEY TRANSPLANT  2015    PARATHYROIDECTOMY  2006    left parathyroid  implant and parathyroidectomy       Family History:  Family History   Problem Relation Age of Onset    Diabetes Mother     Diabetes Father     Dementia Father        Social History:  Social History     Socioeconomic History    Marital status:      Spouse name: Not on file    Number of children: Not on file    Years of education: Not on file    Highest education level: Not on file   Occupational History    Not on file   Tobacco Use    Smoking status: Former Smoker     Packs/day: 1.00     Years: 20.00     Pack years: 20.00     Quit date: 10/1/2015     Years since quittin.4    Smokeless tobacco: Never Used   Vaping Use    Vaping Use: Never used   Substance and Sexual Activity    Alcohol use:  No Comment: 2 cups coffee per day    Drug use: No    Sexual activity: Not on file   Other Topics Concern    Not on file   Social History Narrative    Not on file     Social Determinants of Health     Financial Resource Strain:     Difficulty of Paying Living Expenses: Not on file   Food Insecurity:     Worried About Running Out of Food in the Last Year: Not on file    [place] of Food in the Last Year: Not on file   Transportation Needs:     Lack of Transportation (Medical): Not on file    Lack of Transportation (Non-Medical): Not on file   Physical Activity:     Days of Exercise per Week: Not on file    Minutes of Exercise per Session: Not on file   Stress:     Feeling of Stress : Not on file   Social Connections:     Frequency of Communication with Friends and Family: Not on file    Frequency of Social Gatherings with Friends and Family: Not on file    Attends Synagogue Services: Not on file    Active Member of [address] [place] [address] or Organizations: Not on file    Attends Club or Organization Meetings: Not on file    Marital Status: Not on file   Intimate Partner Violence:     Fear of Current or Ex-Partner: Not on file    Emotionally Abused: Not on file    Physically Abused: Not on file    Sexually Abused: Not on file   Housing Stability:     Unable to Pay for Housing in the Last Year: Not on file    Number of [place] in the Last Year: Not on file    Unstable Housing in the Last Year: Not on file       Allergies: Allergies   Allergen Reactions    Macrodantin [Nitrofurantoin Macrocrystal]     Sulfa Antibiotics        Home Medications:  Prior to Admission medications    Medication Sig Start Date End Date Taking?  Authorizing Provider   apixaban (ELIQUIS) 5 MG TABS tablet Take 5 mg by mouth 2 times daily Take in the morning and at bedtime for 3 months (started 2/19/2022)   Yes Historical Provider, MD   atorvastatin (LIPITOR) 10 MG tablet Take 10 mg by mouth daily   Yes Historical Provider, MD furosemide (LASIX) 20 MG tablet  12/22/21  Yes Historical Provider, MD   lisinopril (PRINIVIL;ZESTRIL) 5 MG tablet take 1 tablet by mouth once daily 12/8/21  Yes Historical Provider, MD   pregabalin (LYRICA) 75 MG capsule  12/26/21  Yes Historical Provider, MD   allopurinol (ZYLOPRIM) 100 MG tablet Take 100 mg by mouth daily   Yes Historical Provider, MD   metoprolol succinate (TOPROL XL) 50 MG extended release tablet take 1 tablet by mouth once daily 4/17/20  Yes Melanie Leblanc MD   desvenlafaxine succinate (PRISTIQ) 50 MG TB24 extended release tablet Take 1 tablet by mouth every evening 12/20/19  Yes Junior Noel MD   cloZAPine (CLOZARIL) 50 MG tablet Take 50 mg by mouth nightly   Yes Historical Provider, MD   cycloSPORINE (SANDIMMUNE) 100 MG capsule Take 100 mg by mouth 2 times daily   Yes Historical Provider, MD   predniSONE (DELTASONE) 5 MG tablet Take 5 mg by mouth daily   Yes Historical Provider, MD   folic acid (FOLVITE) 1 MG tablet Take 1 mg by mouth daily   Yes Historical Provider, MD   docusate sodium (COLACE) 100 MG capsule Take 100 mg by mouth 2 times daily   Yes Historical Provider, MD   mycophenolate (CELLCEPT) 500 MG tablet Take 1,000 mg by mouth 2 times daily   Yes Historical Provider, MD   levothyroxine (SYNTHROID) 50 MCG tablet Take 50 mcg by mouth daily. Yes Historical Provider, MD   nystatin (MYCOSTATIN) 464721 UNIT/GM powder Apply 3 times daily.  12/27/21   KATINA Matamoros - CNP   ipratropium-albuterol (DUONEB) 0.5-2.5 (3) MG/3ML SOLN nebulizer solution Inhale 3 mLs into the lungs every 6 hours as needed for Shortness of Breath 11/27/21   Susannah Ramirez MD   budesonide (PULMICORT) 0.5 MG/2ML nebulizer suspension Take 2 mLs by nebulization 2 times daily for 10 days 11/27/21 12/7/21  Junior Noel MD   ondansetron (ZOFRAN-ODT) 4 MG disintegrating tablet Take 1 tablet by mouth every 8 hours as needed for Nausea or Vomiting 11/27/21   Juinor Noel MD   Cyanocobalamin (B-12 COMPLIANCE INJECTION) 1000 MCG/ML KIT Inject as directed monthly    Historical Provider, MD   simvastatin (ZOCOR) 20 MG tablet Take 20 mg by mouth nightly.       Historical Provider, MD       Current Medications:  Current Facility-Administered Medications   Medication Dose Route Frequency Provider Last Rate Last Admin    lidocaine 1 % injection 5 mL  5 mL IntraDERmal Once Bettey Bores, APRN - CNP        sodium chloride flush 0.9 % injection 5-40 mL  5-40 mL IntraVENous 2 times per day Bettey Bores, APRN - CNP   10 mL at 02/26/22 1210    sodium chloride flush 0.9 % injection 5-40 mL  5-40 mL IntraVENous PRN Bettey Bores, APRN - CNP        0.9 % sodium chloride infusion  25 mL IntraVENous PRN Bettey Bores, APRN - CNP        heparin flush 100 UNIT/ML injection 100 Units  1 mL IntraVENous 2 times per day Bettey Bores, APRN - CNP        heparin flush 100 UNIT/ML injection 100 Units  1 mL IntraCATHeter PRN Bettey Bores, APRN - CNP        pantoprazole (PROTONIX) injection 40 mg  40 mg IntraVENous BID Shannon Hutton DO   40 mg at 02/26/22 1210    0.9 % sodium chloride infusion   IntraVENous Continuous Bettey Bores, APRN -  mL/hr at 02/26/22 1300 Rate Change at 02/26/22 1300    sodium chloride flush 0.9 % injection 5-40 mL  5-40 mL IntraVENous 2 times per day Flakito Flores MD   10 mL at 02/26/22 0857    sodium chloride flush 0.9 % injection 5-40 mL  5-40 mL IntraVENous PRN Susannah Ramirez MD        0.9 % sodium chloride infusion  25 mL IntraVENous PRN Flakito Flores MD        ondansetron (ZOFRAN-ODT) disintegrating tablet 4 mg  4 mg Oral Q8H PRN Vanessa Ramirez MD        Or    ondansetron [facility]) injection 4 mg  4 mg IntraVENous Q6H PRN Susannah Ramirez MD        polyethylene glycol (GLYCOLAX) packet 17 g  17 g Oral Daily PRN Flakito Flores MD        acetaminophen (TYLENOL) tablet 650 mg  650 mg Oral Q6H PRN Flakito Flores MD        Or    acetaminophen (TYLENOL) suppository 650 mg  650 mg Rectal Q6H PRN Juan Mesa MD        ipratropium-albuterol (DUONEB) nebulizer solution 1 ampule  1 ampule Inhalation 4x daily Juan Mesa MD   1 ampule at 02/26/22 1330    allopurinol (ZYLOPRIM) tablet 100 mg  100 mg Oral Daily Ayanna Ramirez MD   100 mg at 02/26/22 0855    budesonide (PULMICORT) nebulizer suspension 500 mcg  500 mcg Nebulization BID Juan Mesa MD   500 mcg at 02/26/22 7431    cloZAPine (CLOZARIL) tablet 50 mg  50 mg Oral Nightly Ayanna Ramirez MD   50 mg at 02/25/22 2110    cycloSPORINE (SANDIMMUNE) capsule 100 mg  100 mg Oral BID Ayanna Ramirez MD   100 mg at 02/26/22 5262    desvenlafaxine succinate (PRISTIQ) extended release tablet 50 mg  50 mg Oral QPM Otisba GERMAN Ramirez MD        docusate sodium (COLACE) capsule 100 mg  100 mg Oral BID Juan Mesa MD   100 mg at 82/35/69 8522    folic acid (FOLVITE) tablet 1 mg  1 mg Oral Daily Juan Mesa MD   1 mg at 02/26/22 0557    levothyroxine (SYNTHROID) tablet 50 mcg  50 mcg Oral Daily Juan Mesa MD   50 mcg at 02/26/22 5242    predniSONE (DELTASONE) tablet 5 mg  5 mg Oral Daily Juan Mesa MD   5 mg at 02/26/22 0856    mycophenolate (CELLCEPT) capsule 1,000 mg  1,000 mg Oral BID Ayanna Ramirez MD   1,000 mg at 02/26/22 0856    piperacillin-tazobactam (ZOSYN) 3,375 mg in dextrose 5 % 50 mL IVPB extended infusion (mini-bag)  3,375 mg IntraVENous Q8H Jazmyne Gresham MD 12.5 mL/hr at 02/26/22 1505 3,375 mg at 02/26/22 1505    0.9 % sodium chloride bolus  25 mL IntraVENous Q8H Casa Rondon MD   Stopped at 02/26/22 1134      sodium chloride      sodium chloride 100 mL/hr at 02/26/22 1300    sodium chloride         Physical Exam:  BP (!) 117/52   Pulse 92   Temp 97.5 °F (36.4 °C) (Infrared)   Resp 16   Ht 5' 2\" (1.575 m)   Wt 220 lb (99.8 kg)   SpO2 95%   BMI 40.24 kg/m²   Wt Readings from Last 3 Encounters:   02/25/22 220 lb (99.8 kg)   01/19/22 216 lb (98 kg) 11/17/21 227 lb 3.2 oz (103.1 kg)       Appearance: Alert and oriented x3 not in acute distress. Skin: Dry skin  Head: Atraumatic  Eyes: Intact extraocular muscles   ENMT: Mucous membranes are moist  Neck: Supple  Lungs: Clear to auscultation  Cardiac: Normal S1 and S2  Abdomen: Protuberant  Extremities: Intact range of motion  Neurologic: No focal neurological deficits  Peripheral Pulses: 2+ peripheral pulses    Intake/Output:    Intake/Output Summary (Last 24 hours) at 2/26/2022 1720  Last data filed at 2/26/2022 1456  Gross per 24 hour   Intake 690 ml   Output 700 ml   Net -10 ml     I/O this shift:   In: 540 [P.O.:540]  Out: 250 [Urine:250]    Laboratory Tests:  Recent Labs     02/25/22  0122 02/26/22  0454    140   K 4.2 4.7   CL 98 103   CO2 33* 27   BUN 29* 30*   CREATININE 1.7* 1.6*   GLUCOSE 113* 106*   CALCIUM 10.6* 9.5     Lab Results   Component Value Date    MG 2.1 01/24/2022    MG 1.9 01/23/2022    MG 2.1 01/22/2022     Recent Labs     02/25/22 0122 02/26/22  0454   ALKPHOS 105* 74   ALT 10 9   AST 16 29   PROT 6.7 5.2*   BILITOT 1.2 0.7   LABALBU 3.5 2.6*     Recent Labs     02/25/22 0122 02/25/22 1948 02/26/22  0454   WBC 6.8 6.4 6.4   RBC 2.80* 2.43* 2.45*   HGB 8.9* 7.8* 7.9*   HCT 31.2* 27.2* 27.6*   .4* 111.9* 112.7*   MCH 31.8 32.1 32.2   MCHC 28.5* 28.7* 28.6*   RDW 17.8* 17.2* 17.1*    141 136   MPV 12.9* 12.4* 12.8*     Lab Results   Component Value Date    CKTOTAL 21 02/25/2022     Recent Labs     02/25/22  0122 02/25/22  0245   CKTOTAL 21  --    TROPHS 191* 176*     Lab Results   Component Value Date    INR 1.1 01/23/2022    INR 1.0 11/20/2021    INR 1.1 11/17/2021    PROTIME 13.0 (H) 01/23/2022    PROTIME 11.5 11/20/2021    PROTIME 12.9 (H) 11/17/2021     Lab Results   Component Value Date    TSH 0.376 11/17/2021    TSH 0.807 12/22/2019     Lab Results   Component Value Date    LABA1C 5.7 (H) 11/17/2021     No results found for: EAG  No results found for: CHOL  No results found for: TRIG  No results found for: HDL  No results found for: LDLCALC, LDLCHOLESTEROL  No results found for: LABVLDL, VLDL  No results found for: CHOLHDLRATIO  Recent Labs     02/25/22  0122   PROBNP 1,013*       Cardiac Tests:  EKG reviewed (EKG date: EKG shows sinus rhythm, left bundle branch block, rate of 98 beats per):        Echocardiogram reviewed: January 2022  Mild concentric left ventricular hypertrophy. There is doppler evidence of stage I diastolic dysfunction. Ejection fraction is visually estimated at 60 to 65%. Normal right ventricular size and function. Mild tricuspid regurgitation. Signature    Stress test reviewed:      Cardiac catheterization reviewed:     CXR reviewed: The ASCVD Risk score (Yoel Selby, et al., 2013) failed to calculate for the following reasons:    Cannot find a previous HDL lab    Cannot find a previous total cholesterol lab    ASSESSMENT / PLAN:    1. Acute respiratory failure with hypoxia (HCC)  Management per pulmonary and critical care  2. Colitis and anemia now being evaluated for EGD and cardiology called for preoperative cardiovascular evaluation  From cardiovascular perspective patient may proceed to the plan GI procedure without additional cardiac testing  Please check with pulmonology prior to the procedure given patient's pulmonary status  Call cardiology if there are concerns prior to, during or after the procedure  Cardiology will monitor closely and follow patient    3. Dehydration  Management per primary service  4. Elevated troponin  Likely demand ischemia in the setting of anemia  Proceed with GI evaluation  Rest of cardiac evaluation will be discussed when patient is stable and discharged in the outpatient    5. Paroxysmal atrial fibrillation  Not on anticoagulation due to bleeding/anemia  Resume rate control therapy when blood pressure is stable     6.   DVT  Has been evaluated for IVC filter in the setting of anemia and GI bleed and not able to tolerate anticoagulation    7. COPD    8. Sleep apnea                  Thank you for allowing me to participate in your patient's care. Please feel free to contact me if you have any questions or concerns.     Artur Wan MD  [facility]) Cardiology

## 2022-02-26 NOTE — PROGRESS NOTES
SPEECH/LANGUAGE PATHOLOGY  VIDEOFLUOROSCOPIC STUDY OF SWALLOWING (MBS)   and PLAN OF CARE    PATIENT NAME:  Garth Castro  (female)     MRN:  [id number]    :  1955  (73 y.o.)  STATUS:  Inpatient: Room 0616-    TODAY'S DATE:  2022  REFERRING PROVIDER:   Dr. Johnson Hunting: [place] modified barium swallow with video  Date of order:  22   REASON FOR REFERRAL: dysphagia   EVALUATING THERAPIST: CHAPARRITA Jacques      RESULTS:      DYSPHAGIA DIAGNOSIS:  moderate oropharyngeal phase dysphagia     DIET RECOMMENDATIONS:  Minced and moist consistency solids (IDDSI level 5) with  nectar consistency (mildly thick - IDDSI level 2) liquids    FEEDING RECOMMENDATIONS:    Assistance level:  Full assistance is needed during all oral intake     Compensatory strategies recommended: Small bites/sips, Alternate solids and liquids, Liquids by teaspoon only, double swallow and No straw     Discussed recommendations with nursing and/or faxed report to referring provider: Yes    Laryngeal Penetration and Aspiration:  Penetration WITHOUT aspiration was observed in today's study with  thin liquid    SPEECH THERAPY  PLAN OF CARE   The dysphagia POC is established based on physician order and dysphagia diagnosis    Skilled SLP intervention for dysphagia management on acute care 3-5 x per week until goals met, pt plateaus in function and/or discharged from hospital      Conditions Requiring Skilled Therapeutic Intervention for dysphagia:    Oral motor strength/coordination impairment  Reduced pharyngeal clearing of the bolus  Reduced laryngeal closure resulting in penetration  Swallow triggered when bolus head at level of pyriform sinus increasing risk of aspiration  Decreased UES opening     SPECIFIC DYSPHAGIA INTERVENTIONS TO INCLUDE:     Compensatory strategy training   Therapeutic exercises  Trials of upgraded diet/liquid     Specific instructions for next treatment:  initiate instruction of therapeutic exercises  and initiate instruction of compensatory strategies  Treatment Goals:    Short Term Goals:  1. Pt will implement identified compensatory swallowing strategies on 90% of opportunities or greater to improve airway protection and swallow function. 2. Pt will complete oral motor strength/ coordination exercises to improve bolus prep/ control and mastication with  minimal verbal prompts . 3. Pt will complete BOTR strength/ ROM exercises to reduce pharyngeal residuals and improve epiglottic inversion minimal verbal prompts  4. Pt will complete laryngeal strength/ ROM therapeutic exercises to improve airway protection for the least restrictive PO diet minimal verbal prompts  5. Pt will complete Shaker and/or Chin Tuck Against Resistance (CTAR) to increase UES relaxation diameter and increase anterior laryngeal excursion to reduce pharyngeal residuals and reduce risk of pen/asp with minimal verbal prompts. 6. Pt will complete Estefani Maneuver therapeutically to target increased pharyngeal constrictor contractions to reduce pharyngeal residue with minimal verbal prompts     Long Term Goals:   Pt will improve oropharyngeal swallow function to ensure airway protection during PO intake to maintain adequate nutrition/hydration and decrease signs/symptoms of aspiration to less than 1 x/day.       Patient/family Goal:    To drink water    Plan of care discussed with Patient   The Patient did not demonstrate complete understanding of the diagnosis, prognosis and plan of care     Rehabilitation Potential/Prognosis: good                      ADMITTING DIAGNOSIS: Dehydration [E86.0]  Colitis [K52.9]  Hypoxia [R09.02]  Elevated troponin [R77.8]  Acute respiratory failure with hypoxia (HCC) [J96.01]  Altered mental status, unspecified altered mental status type [R41.82]     VISIT DIAGNOSIS:   Visit Diagnoses       Codes    Colitis     K52.9    Dehydration     E86.0    Elevated troponin     R77.8 PATIENT REPORT/COMPLAINT: denies difficulty swallowing    PRIOR LEVEL OF SWALLOW FUNCTION:    Past History of Dysphagia?:  yes    Current Diet Order:  ADULT DIET; Dysphagia - Minced and Moist    PROCEDURE:  Consistencies Administered During the Evaluation   Liquids: thin liquid, nectar thick liquid and honey thick liquid   Solids:  pureed foods and solid foods      Method of Intake:   cup, spoon  Self fed, Fed by clinician      Position:   Seated, upright, Lateral plane    INSTRUMENTAL ASSESSMENT:    ORAL PREP/ ORAL PHASE:    Impaired oral initiation  Decreased mastication due to:  decreased lingual control and cognitive function  Delayed A-P transit due to: decreased ability for initiation   , reduced lingual strength  and cognitive function      PHARYNGEAL PHASE:     ONSET TIME       Delayed initiation of the pharyngeal swallow was noted with swallow reflex triggered at the level of the pyriform sinus        PHARYNGEAL RESIDUALS        Vallecula/Pharyngeal Wall           No significant residuals were noted in the vallecula      Pyriform Sinuses      Residuals in the pyriform sinuses were noted due to pharyngeal weakness for all consistencies administered  which  cleared with cued multiple swallow      LARYNGEAL PENETRATION   Laryngeal penetration occurred in the absence of aspiration DURING the swallow for thin liquid due to  delayed laryngeal closure which cleared from the laryngeal vestibule spontaneously (transient).  Laryngeal penetration was minimal and occurred inconsistently   an absent cough/throat clear was noted    ASPIRATION  Aspiration was not present during this evaluation    PENETRATION-ASPIRATION SCALE (PAS):  THIN 2 = Material enters the airway, remains above vocal folds, and is ejected from the airway   MILDLY THICK 1 = Material does not enter the airway  MODERATELY THICK 1 = Material does not enter the airway  PUREE 1 = Material does not enter the airway  HARD SOLID 1 = Material does not enter the airway       COMPENSATORY STRATEGIES    Compensatory strategies that were beneficial included Double swallow, Small bites/sips, Alternate solids and liquids, Liquids by teaspoon only and No straw      STRUCTURAL/FUNCTIONAL ANOMALIES   No structural/functional anomalies were noted    CERVICAL ESOPHAGEAL STAGE :     Was not assessed          ___________    Cognition:   Confusion noted    Oral Peripheral Examination   Generalized oral weakness    Current Respiratory Status   3 liters nasal cannula     Parameters of Speech Production  Respiration:  Adequate for speech production  Quality:   Within functional limits  Intensity: Within functional limits    Pain: No pain reported. EDUCATION:   The Speech Language Pathologist (SLP) completed education regarding results of evaluation and that intervention is warranted at this time. Learner: Patient  Education: Reviewed results and recommendations of this evaluation  Evaluation of Education:  Alfonso Pizarro understanding    This plan may be re-evaluated and revised as warranted. Evaluation Time includes thorough review of current medical information, gathering information on past medical history/social history and prior level of function, completion of standardized testing/informal observation of tasks, assessment of data and education on plan of care and goals. [x]The admitting diagnosis and active problem list, have been reviewed prior to initiation of this evaluation.     CPT Code: 98032  dysphagia study    ACTIVE PROBLEM LIST:   Patient Active Problem List   Diagnosis    Peripheral vascular disease ([facility]    Depression    Clotting disorder ([address]    Abnormal EKG    Cancer ([address]    Over weight    S/p cadaver renal transplant    ESRD (end stage renal disease) ([address]    Non morbid obesity due to excess calories    Hypertension    Leg swelling    Lymphedema of both lower extremities    Acute gout involving toe of right foot    Sepsis secondary to UTI ([facility]    Acute kidney injury superimposed on CKD ([facility] [address]    Thyroid disease    Acute on chronic combined systolic (congestive) and diastolic (congestive) heart failure (HCC)    Sepsis (HCC)    Acute renal failure ([address]    CECILY (acute kidney injury) ([facility] [facility]    Ischemic bowel disease ([address]    Altered mental status    Coma ([facility] [address]    Acute respiratory failure with hypoxia ([facility] [address]    Hypoxia       Nabila Garcia M.S., CCC-SLP/L  Speech-Language Pathologist

## 2022-02-26 NOTE — PROGRESS NOTES
SPEECH/LANGUAGE PATHOLOGY  SPEECH/LANGUAGE/COGNITIVE EVALUATION   and PLAN OF CARE      PATIENT NAME:  Sheri Moritz  (female)     MRN:  [id number]    :  1955  (73 y.o.)  STATUS:  Inpatient: Room 16/0616-    TODAY'S DATE:  22  REFERRING PROVIDER:   Altered mental status    SPECIFIC PROVIDER ORDER:   SLP eval and treat Start: 22, End: 22, ONE TIME, Standing Count: 1 Occurrences, Camacho Ramirez MD  EVALUATING THERAPIST: CHAPARRITA Mejia    ADMITTING DIAGNOSIS: Dehydration [E86.0]  Colitis [K52.9]  Hypoxia [R09.02]  Elevated troponin [R77.8]  Acute respiratory failure with hypoxia (HCC) [J96.01]  Altered mental status, unspecified altered mental status type [R41.82]    VISIT DIAGNOSIS:   Visit Diagnoses       Codes    Colitis     K52.9    Dehydration     E86.0    Elevated troponin     R77.8           SPEECH THERAPY  PLAN OF CARE   The speech therapy  POC is established based on physician order, speech pathology diagnosis and results of clinical assessment     SPEECH PATHOLOGY DIAGNOSIS:    Moderate to severe cognitive linguistic deficit     Speech Pathology intervention is recommended up to 6 times per week for LOS or when goals are met with emphasis on the following:      Conditions Requiring Skilled Therapeutic Intervention for speech, language and/or cognition    Cognitive linguistic impairment  Decreased safety awareness  Decreased short term memory  Decreased problem solving skills     Specific Speech Therapy Interventions to Include: Therapeutic tasks for Cognition    Specific instructions for next treatment: To initiate POC    SHORT/LONG TERM GOALS    Improve awareness of surroundings to include consistent orientation to person, place and time. Increase patient's ability to sustain attention to treatment task for duration of activity despite introduction of extraneous enviromental stimuli.     Increase STM recall of previously reviewed procedure for safe execution of transfers and ambulation within a dynamic environment. Improve functional problem solving/reasoning for successful/safe completion of familiar ADL tasks. Patient goals: Patient/family involved in developing goals and treatment plan:   Treatment goals discussed with Patient    The Patient did not demonstrate complete understanding of the diagnosis, prognosis and plan of care   The patient/family Could not state,     This plan may be re-evaluated and revised as warranted. Rehabilitation Potential/Prognosis: fair                CLINICAL ASSESSMENT:  MOTOR SPEECH       Oral Peripheral Examination   Adequate lingual/labial strength     Parameters of Speech Production  Respiration:  Adequate for speech production  Articulation:  Within functional limits  Resonance:  Within functional limits  Quality:   Within functional limits  Pitch:     Within functional limits  Intensity: Within functional limits  Fluency:  Intact  Prosody Intact    RECEPTIVE LANGUAGE    Comprehension of Yes/No Questions:   Impaired    Process  Simple Verbal Commands:   Impaired  Process Intermediate Verbal Commands:   Impaired  Process Complex Verbal Commands:     Impaired    Comprehension of Conversation:      Impaired      EXPRESSIVE LANGUAGE     Serials: Impaired    Imitation:  Words   Functional   Sentences Functional    Naming:  (Modality used:  Verbal)  Confrontation Naming  Functional  Functional Description  Impaired  Response Naming: Impaired    Conversation:      Confusion was noted during conversation    COGNITION     Attention/Orientation  Attention: Easily Distracted  Orientation:  Oriented to Person    Memory   Immediate Recall: Repeated 0/3    Delayed Recall:   Recalled 0/3    Long Term Recall:   Recalled   poor  Organization/Problem Solving/Reasoning   Verbal Sequencing:   Impaired        Verbal Problem solving:   Impaired          CLINICAL OBSERVATIONS NOTED DURING THE EVALUATION  No topic maintenance present during session. Speech was clear and intelligible however did not show any ability to accurately respond to questions with relevant information. No error awareness. EDUCATION:   The Speech Language Pathologist (SLP) completed education regarding results of evaluation and that intervention is warranted at this time. Learner: Patient  Education: Reviewed results and recommendations of this evaluation  Evaluation of Education:  No evidence of learning    Evaluation Time includes thorough review of current medical information, gathering information on past medical history/social history and prior level of function, completion of standardized testing/informal observation of tasks, assessment of data and education on plan of care and goals. CPT code:    85870  eval speech sound lang comprehension      The admitting diagnosis and active problem list, as listed below have been reviewed prior to initiation of this evaluation.         ACTIVE PROBLEM LIST:   Patient Active Problem List   Diagnosis    Peripheral vascular disease ([address]    Depression    Clotting disorder (HCC)    Abnormal EKG    Cancer ([address]    Over weight    S/p cadaver renal transplant    ESRD (end stage renal disease) ([address]    Non morbid obesity due to excess calories    Hypertension    Leg swelling    Lymphedema of both lower extremities    Acute gout involving toe of right foot    Sepsis secondary to UTI ([address]    Acute kidney injury superimposed on CKD ([address]    Thyroid disease    Acute on chronic combined systolic (congestive) and diastolic (congestive) heart failure (HCC)    Sepsis ([address]    Acute renal failure ([address]    CECILY (acute kidney injury) ([address]    Ischemic bowel disease ([address]    Altered mental status    Coma ([address]    Acute respiratory failure with hypoxia ([address]    Hypoxia       mEily Goncalves MSCCC/SLP  Speech Language Pathologist  [id number]

## 2022-02-27 ENCOUNTER — APPOINTMENT (OUTPATIENT)
Dept: ULTRASOUND IMAGING | Age: 67
DRG: 177 | End: 2022-02-27
Payer: MEDICARE

## 2022-02-27 LAB
AADO2: 62 MMHG
ACINETOBACTER CALCOAC BAUMANNII COMPLEX BY PCR: NOT DETECTED
ALBUMIN SERPL-MCNC: 2.2 G/DL (ref 3.5–5.2)
ALP BLD-CCNC: 72 U/L (ref 35–104)
ALT SERPL-CCNC: 6 U/L (ref 0–32)
ANION GAP SERPL CALCULATED.3IONS-SCNC: 12 MMOL/L (ref 7–16)
ANISOCYTOSIS: ABNORMAL
AST SERPL-CCNC: 11 U/L (ref 0–31)
B.E.: -2.1 MMOL/L (ref -3–3)
BACTEROIDES FRAGILIS BY PCR: NOT DETECTED
BASOPHILIC STIPPLING: ABNORMAL
BASOPHILS ABSOLUTE: 0 E9/L (ref 0–0.2)
BASOPHILS RELATIVE PERCENT: 0 % (ref 0–2)
BILIRUB SERPL-MCNC: 0.7 MG/DL (ref 0–1.2)
BOTTLE TYPE: ABNORMAL
BUN BLDV-MCNC: 31 MG/DL (ref 6–23)
CALCIUM SERPL-MCNC: 9.1 MG/DL (ref 8.6–10.2)
CANDIDA ALBICANS BY PCR: NOT DETECTED
CANDIDA AURIS BY PCR: NOT DETECTED
CANDIDA GLABRATA BY PCR: NOT DETECTED
CANDIDA KRUSEI BY PCR: NOT DETECTED
CANDIDA PARAPSILOSIS BY PCR: NOT DETECTED
CANDIDA TROPICALIS BY PCR: NOT DETECTED
CHLORIDE BLD-SCNC: 106 MMOL/L (ref 98–107)
CO2: 21 MMOL/L (ref 22–29)
COHB: 0.2 % (ref 0–1.5)
CREAT SERPL-MCNC: 1.7 MG/DL (ref 0.5–1)
CRITICAL: ABNORMAL
CRYPTOCOCCUS NEOFORMANS/GATTII BY PCR: NOT DETECTED
DATE ANALYZED: ABNORMAL
DATE OF COLLECTION: ABNORMAL
ENTEROBACTER CLOACAE COMPLEX BY PCR: NOT DETECTED
ENTEROBACTERALES BY PCR: NOT DETECTED
ENTEROCOCCUS FAECALIS BY PCR: NOT DETECTED
ENTEROCOCCUS FAECIUM BY PCR: NOT DETECTED
EOSINOPHILS ABSOLUTE: 0 E9/L (ref 0.05–0.5)
EOSINOPHILS RELATIVE PERCENT: 0 % (ref 0–6)
ESCHERICHIA COLI BY PCR: NOT DETECTED
FIO2: 30 %
GFR AFRICAN AMERICAN: 36
GFR NON-AFRICAN AMERICAN: 30 ML/MIN/1.73
GLUCOSE BLD-MCNC: 100 MG/DL (ref 74–99)
HAEMOPHILUS INFLUENZAE BY PCR: NOT DETECTED
HCO3: 24 MMOL/L (ref 22–26)
HCT VFR BLD CALC: 25.5 % (ref 34–48)
HCT VFR BLD CALC: 28.6 % (ref 34–48)
HEMOGLOBIN: 7.1 G/DL (ref 11.5–15.5)
HEMOGLOBIN: 7.4 G/DL (ref 11.5–15.5)
HEMOGLOBIN: 8.4 G/DL (ref 11.5–15.5)
HEMOGLOBIN: 8.7 G/DL (ref 11.5–15.5)
HHB: 4.2 % (ref 0–5)
HYPOCHROMIA: ABNORMAL
KLEBSIELLA AEROGENES BY PCR: NOT DETECTED
KLEBSIELLA OXYTOCA BY PCR: NOT DETECTED
KLEBSIELLA PNEUMONIAE GROUP BY PCR: NOT DETECTED
LAB: ABNORMAL
LISTERIA MONOCYTOGENES BY PCR: NOT DETECTED
LYMPHOCYTES ABSOLUTE: 0.84 E9/L (ref 1.5–4)
LYMPHOCYTES RELATIVE PERCENT: 15 % (ref 20–42)
Lab: ABNORMAL
MAGNESIUM: 1.5 MG/DL (ref 1.6–2.6)
MCH RBC QN AUTO: 32 PG (ref 26–35)
MCHC RBC AUTO-ENTMCNC: 27.8 % (ref 32–34.5)
MCV RBC AUTO: 114.9 FL (ref 80–99.9)
METHB: 0.5 % (ref 0–1.5)
METHICILLIN RESISTANCE MECA/C  BY PCR: DETECTED
MODE: ABNORMAL
MONOCYTES ABSOLUTE: 0.67 E9/L (ref 0.1–0.95)
MONOCYTES RELATIVE PERCENT: 12 % (ref 2–12)
NEISSERIA MENINGITIDIS BY PCR: NOT DETECTED
NEUTROPHILS ABSOLUTE: 4.09 E9/L (ref 1.8–7.3)
NEUTROPHILS RELATIVE PERCENT: 73 % (ref 43–80)
O2 CONTENT: 11 ML/DL
O2 SATURATION: 95.8 % (ref 92–98.5)
O2HB: 95.1 % (ref 94–97)
OPERATOR ID: 797
ORDER NUMBER: ABNORMAL
OVALOCYTES: ABNORMAL
PATIENT TEMP: 37 C
PCO2: 47.6 MMHG (ref 35–45)
PDW BLD-RTO: 17.2 FL (ref 11.5–15)
PEEP/CPAP: 5 CMH2O
PFO2: 2.95 MMHG/%
PH BLOOD GAS: 7.32 (ref 7.35–7.45)
PHOSPHORUS: 3.7 MG/DL (ref 2.5–4.5)
PIP: 15 CMH2O
PLATELET # BLD: 132 E9/L (ref 130–450)
PMV BLD AUTO: 13 FL (ref 7–12)
PO2: 88.5 MMHG (ref 75–100)
POIKILOCYTES: ABNORMAL
POLYCHROMASIA: ABNORMAL
POTASSIUM SERPL-SCNC: 4 MMOL/L (ref 3.5–5)
PROCALCITONIN: 0.36 NG/ML (ref 0–0.08)
PROTEUS SPECIES BY PCR: NOT DETECTED
PSEUDOMONAS AERUGINOSA BY PCR: NOT DETECTED
RBC # BLD: 2.22 E12/L (ref 3.5–5.5)
RI(T): 0.7
SALMONELLA SPECIES BY PCR: NOT DETECTED
SERRATIA MARCESCENS BY PCR: NOT DETECTED
SODIUM BLD-SCNC: 139 MMOL/L (ref 132–146)
SOURCE OF BLOOD CULTURE: ABNORMAL
SOURCE, BLOOD GAS: ABNORMAL
STAPHYLOCOCCUS AUREUS BY PCR: NOT DETECTED
STAPHYLOCOCCUS EPIDERMIDIS BY PCR: DETECTED
STAPHYLOCOCCUS LUGDUNENSIS BY PCR: NOT DETECTED
STAPHYLOCOCCUS SPECIES BY PCR: DETECTED
STENOTROPHOMONAS MALTOPHILIA BY PCR: NOT DETECTED
STREPTOCOCCUS AGALACTIAE BY PCR: NOT DETECTED
STREPTOCOCCUS PNEUMONIAE BY PCR: NOT DETECTED
STREPTOCOCCUS PYOGENES  BY PCR: NOT DETECTED
STREPTOCOCCUS SPECIES BY PCR: NOT DETECTED
THB: 8.1 G/DL (ref 11.5–16.5)
TIME ANALYZED: 1105
TOTAL PROTEIN: 5 G/DL (ref 6.4–8.3)
WBC # BLD: 5.6 E9/L (ref 4.5–11.5)

## 2022-02-27 PROCEDURE — 94660 CPAP INITIATION&MGMT: CPT

## 2022-02-27 PROCEDURE — 93971 EXTREMITY STUDY: CPT

## 2022-02-27 PROCEDURE — 6360000002 HC RX W HCPCS: Performed by: INTERNAL MEDICINE

## 2022-02-27 PROCEDURE — 85018 HEMOGLOBIN: CPT

## 2022-02-27 PROCEDURE — 2700000000 HC OXYGEN THERAPY PER DAY

## 2022-02-27 PROCEDURE — 86694 HERPES SIMPLEX NES ANTBDY: CPT

## 2022-02-27 PROCEDURE — 36600 WITHDRAWAL OF ARTERIAL BLOOD: CPT

## 2022-02-27 PROCEDURE — 84145 PROCALCITONIN (PCT): CPT

## 2022-02-27 PROCEDURE — 2580000003 HC RX 258: Performed by: CLINICAL NURSE SPECIALIST

## 2022-02-27 PROCEDURE — 36415 COLL VENOUS BLD VENIPUNCTURE: CPT

## 2022-02-27 PROCEDURE — 83735 ASSAY OF MAGNESIUM: CPT

## 2022-02-27 PROCEDURE — 85025 COMPLETE CBC W/AUTO DIFF WBC: CPT

## 2022-02-27 PROCEDURE — 80053 COMPREHEN METABOLIC PANEL: CPT

## 2022-02-27 PROCEDURE — 86695 HERPES SIMPLEX TYPE 1 TEST: CPT

## 2022-02-27 PROCEDURE — 87040 BLOOD CULTURE FOR BACTERIA: CPT

## 2022-02-27 PROCEDURE — 84100 ASSAY OF PHOSPHORUS: CPT

## 2022-02-27 PROCEDURE — 82805 BLOOD GASES W/O2 SATURATION: CPT

## 2022-02-27 PROCEDURE — 6370000000 HC RX 637 (ALT 250 FOR IP): Performed by: INTERNAL MEDICINE

## 2022-02-27 PROCEDURE — 2060000000 HC ICU INTERMEDIATE R&B

## 2022-02-27 PROCEDURE — 2580000003 HC RX 258: Performed by: NURSE PRACTITIONER

## 2022-02-27 PROCEDURE — 85014 HEMATOCRIT: CPT

## 2022-02-27 PROCEDURE — 94640 AIRWAY INHALATION TREATMENT: CPT

## 2022-02-27 PROCEDURE — C9113 INJ PANTOPRAZOLE SODIUM, VIA: HCPCS | Performed by: INTERNAL MEDICINE

## 2022-02-27 PROCEDURE — 6360000002 HC RX W HCPCS: Performed by: CLINICAL NURSE SPECIALIST

## 2022-02-27 PROCEDURE — 36430 TRANSFUSION BLD/BLD COMPNT: CPT

## 2022-02-27 PROCEDURE — 2580000003 HC RX 258: Performed by: INTERNAL MEDICINE

## 2022-02-27 PROCEDURE — 86696 HERPES SIMPLEX TYPE 2 TEST: CPT

## 2022-02-27 PROCEDURE — 99232 SBSQ HOSP IP/OBS MODERATE 35: CPT | Performed by: INTERNAL MEDICINE

## 2022-02-27 PROCEDURE — 6360000002 HC RX W HCPCS: Performed by: NURSE PRACTITIONER

## 2022-02-27 PROCEDURE — 99233 SBSQ HOSP IP/OBS HIGH 50: CPT | Performed by: INTERNAL MEDICINE

## 2022-02-27 PROCEDURE — APPSS30 APP SPLIT SHARED TIME 16-30 MINUTES: Performed by: NURSE PRACTITIONER

## 2022-02-27 PROCEDURE — 87449 NOS EACH ORGANISM AG IA: CPT

## 2022-02-27 RX ORDER — SODIUM CHLORIDE 9 MG/ML
INJECTION, SOLUTION INTRAVENOUS PRN
Status: COMPLETED | OUTPATIENT
Start: 2022-02-27 | End: 2022-02-28

## 2022-02-27 RX ORDER — MAGNESIUM SULFATE IN WATER 40 MG/ML
2000 INJECTION, SOLUTION INTRAVENOUS ONCE
Status: COMPLETED | OUTPATIENT
Start: 2022-02-27 | End: 2022-02-27

## 2022-02-27 RX ADMIN — SODIUM CHLORIDE, PRESERVATIVE FREE 10 ML: 5 INJECTION INTRAVENOUS at 22:05

## 2022-02-27 RX ADMIN — SODIUM CHLORIDE 25 ML: 9 INJECTION, SOLUTION INTRAVENOUS at 08:28

## 2022-02-27 RX ADMIN — ACETAMINOPHEN 650 MG: 325 TABLET ORAL at 08:41

## 2022-02-27 RX ADMIN — PIPERACILLIN SODIUM AND TAZOBACTAM SODIUM 3375 MG: 3; .375 INJECTION, POWDER, LYOPHILIZED, FOR SOLUTION INTRAVENOUS at 15:13

## 2022-02-27 RX ADMIN — PANTOPRAZOLE SODIUM 40 MG: 40 INJECTION, POWDER, FOR SOLUTION INTRAVENOUS at 08:40

## 2022-02-27 RX ADMIN — SODIUM CHLORIDE: 9 INJECTION, SOLUTION INTRAVENOUS at 21:44

## 2022-02-27 RX ADMIN — MYCOPHENOLATE MOFETIL 1000 MG: 250 CAPSULE ORAL at 08:41

## 2022-02-27 RX ADMIN — VANCOMYCIN HYDROCHLORIDE 1500 MG: 500 INJECTION, POWDER, LYOPHILIZED, FOR SOLUTION INTRAVENOUS at 17:25

## 2022-02-27 RX ADMIN — ALLOPURINOL 100 MG: 100 TABLET ORAL at 08:41

## 2022-02-27 RX ADMIN — PIPERACILLIN SODIUM AND TAZOBACTAM SODIUM 3375 MG: 3; .375 INJECTION, POWDER, LYOPHILIZED, FOR SOLUTION INTRAVENOUS at 03:47

## 2022-02-27 RX ADMIN — SODIUM CHLORIDE: 9 INJECTION, SOLUTION INTRAVENOUS at 00:26

## 2022-02-27 RX ADMIN — IPRATROPIUM BROMIDE AND ALBUTEROL SULFATE 1 AMPULE: .5; 2.5 SOLUTION RESPIRATORY (INHALATION) at 16:19

## 2022-02-27 RX ADMIN — LEVOTHYROXINE SODIUM 50 MCG: 0.05 TABLET ORAL at 06:21

## 2022-02-27 RX ADMIN — Medication 10 ML: at 08:41

## 2022-02-27 RX ADMIN — CLOZAPINE 50 MG: 25 TABLET ORAL at 21:47

## 2022-02-27 RX ADMIN — DOCUSATE SODIUM 100 MG: 100 CAPSULE ORAL at 21:47

## 2022-02-27 RX ADMIN — IPRATROPIUM BROMIDE AND ALBUTEROL SULFATE 1 AMPULE: .5; 2.5 SOLUTION RESPIRATORY (INHALATION) at 09:10

## 2022-02-27 RX ADMIN — DESVENLAFAXINE SUCCINATE 50 MG: 50 TABLET, FILM COATED, EXTENDED RELEASE ORAL at 18:02

## 2022-02-27 RX ADMIN — Medication 10 ML: at 21:46

## 2022-02-27 RX ADMIN — FOLIC ACID 1 MG: 1 TABLET ORAL at 08:41

## 2022-02-27 RX ADMIN — BUDESONIDE 500 MCG: 0.5 INHALANT RESPIRATORY (INHALATION) at 16:19

## 2022-02-27 RX ADMIN — CYCLOSPORINE 100 MG: 25 CAPSULE, GELATIN COATED ORAL at 21:46

## 2022-02-27 RX ADMIN — DOCUSATE SODIUM 100 MG: 100 CAPSULE ORAL at 08:41

## 2022-02-27 RX ADMIN — SODIUM CHLORIDE 25 ML: 9 INJECTION, SOLUTION INTRAVENOUS at 00:27

## 2022-02-27 RX ADMIN — MAGNESIUM SULFATE IN WATER 2000 MG: 40 INJECTION, SOLUTION INTRAVENOUS at 08:42

## 2022-02-27 RX ADMIN — Medication 10 ML: at 22:06

## 2022-02-27 RX ADMIN — CYCLOSPORINE 100 MG: 25 CAPSULE, GELATIN COATED ORAL at 08:46

## 2022-02-27 RX ADMIN — IPRATROPIUM BROMIDE AND ALBUTEROL SULFATE 1 AMPULE: .5; 2.5 SOLUTION RESPIRATORY (INHALATION) at 05:47

## 2022-02-27 RX ADMIN — PANTOPRAZOLE SODIUM 40 MG: 40 INJECTION, POWDER, FOR SOLUTION INTRAVENOUS at 21:45

## 2022-02-27 RX ADMIN — IPRATROPIUM BROMIDE AND ALBUTEROL SULFATE 1 AMPULE: .5; 2.5 SOLUTION RESPIRATORY (INHALATION) at 12:59

## 2022-02-27 RX ADMIN — PIPERACILLIN SODIUM AND TAZOBACTAM SODIUM 3375 MG: 3; .375 INJECTION, POWDER, LYOPHILIZED, FOR SOLUTION INTRAVENOUS at 21:45

## 2022-02-27 RX ADMIN — PREDNISONE 5 MG: 5 TABLET ORAL at 08:41

## 2022-02-27 RX ADMIN — BUDESONIDE 500 MCG: 0.5 INHALANT RESPIRATORY (INHALATION) at 05:47

## 2022-02-27 RX ADMIN — MYCOPHENOLATE MOFETIL 1000 MG: 250 CAPSULE ORAL at 21:47

## 2022-02-27 ASSESSMENT — PAIN SCALES - GENERAL
PAINLEVEL_OUTOF10: 0
PAINLEVEL_OUTOF10: 2

## 2022-02-27 NOTE — PROGRESS NOTES
Progress Note    Date:2/27/2022       Room:[facility]0616-  Patient Hood Morgan     YOB: 1955     Age:66 y.o. Subjective   Interval History Status: not changed.      Has undergone IVC filter placement  Sleepy but awakens on exam today  Intermittently on bipap  No events reported overnight      Review of Systems   History obtained from chart review and the patient  General ROS: negative  Psychological ROS: negative  Respiratory ROS: positive for - shortness of breath  Cardiovascular ROS: negative  Gastrointestinal ROS: negative  Musculoskeletal ROS: negative  Neurological ROS: negative    Medications   Scheduled Meds:    magnesium sulfate  2,000 mg IntraVENous Once    lidocaine  5 mL IntraDERmal Once    sodium chloride flush  5-40 mL IntraVENous 2 times per day    heparin flush  1 mL IntraVENous 2 times per day    pantoprazole  40 mg IntraVENous BID    sodium chloride flush  5-40 mL IntraVENous 2 times per day    ipratropium-albuterol  1 ampule Inhalation 4x daily    allopurinol  100 mg Oral Daily    budesonide  500 mcg Nebulization BID    cloZAPine  50 mg Oral Nightly    cycloSPORINE  100 mg Oral BID    desvenlafaxine succinate  50 mg Oral QPM    docusate sodium  100 mg Oral BID    folic acid  1 mg Oral Daily    levothyroxine  50 mcg Oral Daily    predniSONE  5 mg Oral Daily    mycophenolate  1,000 mg Oral BID    piperacillin-tazobactam  3,375 mg IntraVENous Q8H    sodium chloride  25 mL IntraVENous Q8H     Continuous Infusions:    sodium chloride      sodium chloride      sodium chloride 100 mL/hr at 02/27/22 0026    sodium chloride       PRN Meds: sodium chloride, sodium chloride flush, sodium chloride, heparin flush, sodium chloride flush, sodium chloride, ondansetron **OR** ondansetron, polyethylene glycol, acetaminophen **OR** acetaminophen    Past History    Past Medical History:   has a past medical history of Abnormal EKG, Acute gout involving toe of right foot, Acute gout involving toe of right foot, Acute on chronic combined systolic (congestive) and diastolic (congestive) heart failure ([address] Cancer ([address] Cerebrovascular disease, Clotting disorder ([address] Depression, Hyperlipidemia, Hypertension, Hyperthyroidism, Leg swelling, Lymphedema of both lower extremities, Peripheral vascular disease ([address] Renal failure, Thrombophlebitis, and Thyroid disease. Social History:   reports that she quit smoking about 6 years ago. She has a 20.00 pack-year smoking history. She has never used smokeless tobacco. She reports that she does not drink alcohol and does not use drugs. Family History:   Family History   Problem Relation Age of Onset    Diabetes Mother     Diabetes Father     Dementia Father        Physical Examination      Vitals:  BP (!) 97/51   Pulse 89   Temp 97.7 °F (36.5 °C) (Infrared)   Resp 15   Ht 5' 2\" (1.575 m)   Wt 220 lb (99.8 kg)   SpO2 100%   BMI 40.24 kg/m²   Temp (24hrs), Av.8 °F (36.6 °C), Min:97.1 °F (36.2 °C), Max:99.2 °F (37.3 °C)      I/O (24Hr): Intake/Output Summary (Last 24 hours) at 2022 1026  Last data filed at 2022 0455  Gross per 24 hour   Intake 2223 ml   Output 700 ml   Net 1523 ml       CONSTITUTIONAL:  fatigued, somnolent, cooperative, no apparent distress and moderately obese  EYES:  Lids and lashes normal, pupils equal, round and reactive to light, extra ocular muscles intact, sclera clear, conjunctiva normal  ENT:  Normocephalic, without obvious abnormality, atraumatic, sinuses nontender on palpation, external ears without lesions, oral pharynx with moist mucus membranes, tonsils without erythema or exudates, gums normal and good dentition.   LUNGS:  No increased work of breathing, good air exchange, clear to auscultation bilaterally, no crackles or wheezing  CARDIOVASCULAR:  Normal apical impulse, regular rate and rhythm, normal S1 and S2, no S3 or S4, and no murmur noted  ABDOMEN:  No scars, normal bowel sounds, soft, non-distended, non-tender, no masses palpated, no hepatosplenomegally  MUSCULOSKELETAL:  There is no redness, warmth, or swelling of the joints. Full range of motion noted. Motor strength is 5 out of 5 all extremities bilaterally. Tone is normal.    Labs/Imaging/Diagnostics   Labs:  CBC:  Recent Labs     02/25/22 1948 02/25/22 1948 02/26/22 0454 02/27/22  0344 02/27/22  0953   WBC 6.4  --  6.4 5.6  --    RBC 2.43*  --  2.45* 2.22*  --    HGB 7.8*   < > 7.9* 7.1* 7.4*   HCT 27.2*  --  27.6* 25.5*  --    .9*  --  112.7* 114.9*  --    RDW 17.2*  --  17.1* 17.2*  --      --  136 132  --     < > = values in this interval not displayed. CHEMISTRIES:  Recent Labs     02/25/22 0122 02/26/22 0454 02/27/22  0344    140 139   K 4.2 4.7 4.0   CL 98 103 106   CO2 33* 27 21*   BUN 29* 30* 31*   CREATININE 1.7* 1.6* 1.7*   GLUCOSE 113* 106* 100*   PHOS  --   --  3.7   MG  --   --  1.5*     PT/INR:No results for input(s): PROTIME, INR in the last 72 hours. APTT:  Recent Labs     02/25/22 1948 02/26/22  0320   APTT 35.7* >240.0*     LIVER PROFILE:  Recent Labs     02/25/22 0122 02/26/22 0454 02/27/22  0344   AST 16 29 11   ALT 10 9 6   BILITOT 1.2 0.7 0.7   ALKPHOS 105* 74 72       Imaging Last 24 Hours:  NM LUNG VENT/PERFUSION (VQ)    Result Date: 2/25/2022  EXAMINATION: NUCLEAR MEDICINE VENTILATION PERFUSION SCAN. 2/25/2022 TECHNIQUE: 3.5 millicuries aerosolized Tc99m DTPA was administered via mask prior to planar imaging of the lungs in multiple projections. Then, 4.2 millicuries of Tc 47S MAA was administered intravenously prior to planar imaging of the lungs in similar projections. COMPARISON: Chest CT February 25, 2022 . HISTORY: ORDERING SYSTEM PROVIDED HISTORY: R/O PE TECHNOLOGIST PROVIDED HISTORY: Reason for exam:->R/O PE What reading provider will be dictating this exam?->CRC FINDINGS: PERFUSION: Distribution of radiotracer is homogeneous.   No segmental defects identified. VENTILATION: Ventilation images are unremarkable. CHEST CT: Small infiltrate or atelectasis posterior right lung. Negative for pulmonary embolus. IR GUIDED IVC FILTER PLACEMENT    Result Date: 2/26/2022  EXAMINATION: INFERIOR VENA CAVA (IVC) FILTER INSERTION 2/26/2022 Procedural Personnel: Kylah Vicente MD HISTORY: Indication: Bleeding with IV heparin ORDERING SYSTEM PROVIDED HISTORY: IVC filter TECHNOLOGIST PROVIDED HISTORY: Reason for exam:->IVC filter Anticoagulation contraindicated SEDATION: None CONTRAST: Contrast agent: Isovue 320 Contrast volume: 30mL FLUOROSCOPY DOSE AND TYPE OR TIME AND EXPOSURES: Fluoroscopy time/Number of Images: Fluoroscopy time 1.5 minutes. Reference [facility] kerma: [id number] PROCEDURE: Informed consent for the procedure including risks, benefits and alternatives was obtained and time-out was performed prior to the procedure. Universal protocol was followed. A suitable skin site was prepared and draped using all elements of maximal sterile barrier technique including sterile gloves, sterile gown, cap, mask, large sterile sheet, sterile ultrasound probe cover, hand hygiene, and cutaneous antisepsis. Time-out was called and the patient's order and identity were verified. Local anesthesia was administered. The vessel was sonographically evaluated and judged appropriate for access. Real time ultrasound was used to visualize needle entry into the vessel and an ultrasound image was stored in PACS. Vein accessed: Right common femoral vein. Access technique: Micropuncture set with 21 gauge needle A 0.035 guide wire was used to place a catheter into the inferior vena cava. A vena cavogram was performed. A Bard Pinal filter was deployed in routine fashion in the infrarenal IVC under fluoroscopic guidance. The sheath was removed and hemostasis was achieved with manual compression. A sterile dressing was applied. Patient tolerated procedure well.  Complications: No immediate complications. Standardized report: [id number]_IVCFilterInsertion2.0 [id number] FINDINGS: US: The access vein is patent. Inferior Vena Cavogram: The vena cava is patent and normal in size without thrombus or anomalies. Post-placement imaging: Spot filmdemonstrates the filter in the infrarenalvena cava with less than 15 degrees tilt from the vena cava access. Successful vena cava filter placement. PLAN: Retrieval intent (MIPS): The filter is potentially retrievable. Plan/Timing For Retrieval: Ordering Physician/Contact For Retrieval Plan: Jorge Goyal     XR CHEST 1 VIEW    Result Date: 2/25/2022  EXAMINATION: ONE XRAY VIEW OF THE CHEST 2/25/2022 2:49 pm COMPARISON: None. HISTORY: ORDERING SYSTEM PROVIDED HISTORY: needs to be done with VQ scan TECHNOLOGIST PROVIDED HISTORY: Last chest Xray done more than 24 hours ago Reason for exam:->needs to be done with VQ scan FINDINGS: The heart is mildly enlarged. There are no findings of failure. The lungs are clear. There is no focal infiltrate or effusion. .     1. Mild cardiomegaly. There is no evidence of failure or pneumonia. IR ULTRASOUND GUIDANCE VASCULAR ACCESS    Result Date: 2/26/2022  EXAMINATION: INFERIOR VENA CAVA (IVC) FILTER INSERTION 2/26/2022 Procedural Personnel: Nick Gee MD HISTORY: Indication: Bleeding with IV heparin ORDERING SYSTEM PROVIDED HISTORY: IVC filter TECHNOLOGIST PROVIDED HISTORY: Reason for exam:->IVC filter Anticoagulation contraindicated SEDATION: None CONTRAST: Contrast agent: Isovue 320 Contrast volume: 30mL FLUOROSCOPY DOSE AND TYPE OR TIME AND EXPOSURES: Fluoroscopy time/Number of Images: Fluoroscopy time 1.5 minutes. Reference air [place] kerma: [id number] PROCEDURE: Informed consent for the procedure including risks, benefits and alternatives was obtained and time-out was performed prior to the procedure. Universal protocol was followed.  A suitable skin site was prepared and draped using all elements of maximal sterile barrier technique including sterile gloves, sterile gown, cap, mask, large sterile sheet, sterile ultrasound probe cover, hand hygiene, and cutaneous antisepsis. Time-out was called and the patient's order and identity were verified. Local anesthesia was administered. The vessel was sonographically evaluated and judged appropriate for access. Real time ultrasound was used to visualize needle entry into the vessel and an ultrasound image was stored in PACS. Vein accessed: Right common femoral vein. Access technique: Micropuncture set with 21 gauge needle A 0.035 guide wire was used to place a catheter into the inferior vena cava. A vena cavogram was performed. A Bard Rosa filter was deployed in routine fashion in the infrarenal IVC under fluoroscopic guidance. The sheath was removed and hemostasis was achieved with manual compression. A sterile dressing was applied. Patient tolerated procedure well. Complications: No immediate complications. Standardized report: SIR_IVCFilterInsertion2.0 [id number] FINDINGS: US: The access vein is patent. Inferior Vena Cavogram: The vena cava is patent and normal in size without thrombus or anomalies. Post-placement imaging: Spot filmdemonstrates the filter in the infrarenalvena cava with less than 15 degrees tilt from the vena cava access. Successful vena cava filter placement. PLAN: Retrieval intent (MIPS): The filter is potentially retrievable. Plan/Timing For Retrieval: Ordering Physician/Contact For Retrieval Plan: Leora GUERRIER MODIFIED BARIUM SWALLOW W VIDEO    Result Date: 2/26/2022  EXAMINATION: MODIFIED BARIUM SWALLOW WAS PERFORMED IN CONJUNCTION WITH SPEECH PATHOLOGY SERVICES TECHNIQUE: Fluoroscopic evaluation of the swallowing mechanism was performed using cineradiography with multiple consistency of barium product in conjunction with speech pathology services. FLUOROSCOPY DOSE AND TYPE OR TIME AND EXPOSURES: Fluoroscopy time 2.1 minutes. Air kerma 5.49 mGy COMPARISON: None HISTORY: ORDERING SYSTEM PROVIDED HISTORY: dysphagia, aspriation pna? TECHNOLOGIST PROVIDED HISTORY: Reason for exam:->dysphagia, aspriation pna? FINDINGS: There was oral delay and pharyngeal delay. Laryngeal elevation was adequate. There was retention in the piriform sinuses but not the vallecula. Transient laryngeal penetration is seen with thin liquid barium. Otherwise, no aspiration or laryngeal penetration is seen. Strands it laryngeal penetration with thin liquid barium. No evidence of aspiration. Please see separate speech pathology report for full discussion of findings and recommendations. RECOMMENDATIONS: Unavailable      DUP LOWER EXTREMITIES BILATERAL VENOUS    Result Date: 2/25/2022  EXAMINATION: DUPLEX VENOUS ULTRASOUND OF THE BILATERAL LOWER EXTREMITIES2/25/2022 2:47 pm TECHNIQUE: Duplex ultrasound using B-mode/gray scaled imaging, Doppler spectral analysis and color flow Doppler was obtained of the deep venous structures of the lower bilateral extremities. COMPARISON: None. HISTORY: ORDERING SYSTEM PROVIDED HISTORY: R/O DVT TECHNOLOGIST PROVIDED HISTORY: Reason for exam:->R/O DVT What reading provider will be dictating this exam?->CRC FINDINGS: Right lower extremity:  The visualized veins of the bilateral lower extremities are patent and free of echogenic thrombus. The veins demonstrate good compressibility with normal color flow study and spectral analysis. Left lower extremity: There are incompletely occlusive thrombi within the mid and distal left femoral vein and the popliteal vein. They have developed in the interim since the prior lower extremity Doppler from 11/17/2021.     1. INCOMPLETELY OCCLUSIVE THROMBI in the mid and distal LEFT femoral vein and within the popliteal vein. They have developed in the interim since the previous study from 11/17/2021. 2. No evidence of DVT in the right lower extremity.  RECOMMENDATIONS: Unavailable Assessment        Hospital Problems           Last Modified POA    * (Principal) Acute respiratory failure with hypoxia ([address] 2/25/2022 Yes    Hypoxia 2/25/2022 Yes          Plan:         Morbid obesity concern for obesity hypoventilation and sleep apnea  Acute on chronic hypoxic and hypercapnic respiratory failure  Deep vein thrombosis s/p IVC filter  COPD  Concern for aspiration pneumonitis  Bipolar disorder  -O2 requirements stable at 2 L via nasal cannula  -BiPAP intermittently as needed and nocturnal  -Somewhat lethargic on this day we will check ABG ensure no worsened hypercapnia  -Dietary modifications per speech therapy  --Avoid medications that can worsen hypercapnia picture  -Course of Zosyn mapped out for aspiration insult 1 week total therapy    CKD  History of prior renal transplant  -Immunosuppression regimen cyclosporine, mycophenolate, prednisone    Colitis on CT image  History of recent pneumatosis status post surgery late January  Acute on chronic anemia  Left lower extremity DVT  -GI is following  -Anticoagulation currently on hold with drop in hemoglobin  -EGD and colonoscopy when able  -Status post IVC filter on February 26      Jia Allred PA-C    Electronically signed by Yue Sales on 2/27/22 at 10:26 AM EST    ATTESTATION:  ICU Staff Physician note of personal involvement in Care  As the attending physician, I certify that I personally reviewed the patients history and personnally examined the patient to confirm the physical findings described above,  And that I reviewed the relevant imaging studies and available reports.  I also discussed the differential diagnosis and all of the proposed management plans with the patient and individuals accompanying the patient to this visit.  They had the opportunity to ask questions about the proposed management plans and to have those questions answered.     I agree with the physical examination, assessment and plan in the note of Brisa Walker Agnieszka Stevenson MD  Pulmonary and Critical Care Medicine

## 2022-02-27 NOTE — PROGRESS NOTES
Patient continues to pull at BiPAP mask, voiced that \" I want to take it off\", and was needing repositioned. Patient repositioned, educated on the importance of BiPAP therapy for respiratory acidosis, and  Is currently in bed lying down with eyes closed resting comfortably on BiPAP at this time. Blood transfusing and SpO2 maintaining at 96%. Electronically signed by Darling Ellis RN on 2/27/2022 at 12:17 PM

## 2022-02-27 NOTE — CONSULTS
[facility]  Consult Note    [address] [facility] [address]  [place], [address]  Phone [phone]   [phone]      Admit Date: 2022  1:08 AM  Pt Name: Chencho Mendoza  MRN: [id number]  : 1955  Reason for Consult:    Chief Complaint   Patient presents with    Altered Mental Status     hx of bipolar, Hx liver failure/renal failure, No history of AMS, no HX of dialysis, was hypoxic at [facility] 76% on room air was placed on 3L NC 93%. Requesting Physician:  Aster Vuong MD  PCP: Uriel Epstein MD  History Obtained From:  patient, chart   ID consulted for Dehydration [E86.0]  Colitis [K52.9]  Hypoxia [R09.02]  Elevated troponin [R77.8]  Acute respiratory failure with hypoxia (HCC) [J96.01]  Altered mental status, unspecified altered mental status type [R41.82]  on hospital day 2   Face to face encounter   [facility]       Chief Complaint   Patient presents with    Altered Mental Status     hx of bipolar, Hx liver failure/renal failure, No history of AMS, no HX of dialysis, was hypoxic at [facility] 76% on room air was placed on 3L NC 93%. HISTORYOF PRESENT ILLNESS   Chencho Mendoza is a 73 y.o. female who presents with   has a past medical history of Abnormal EKG, Acute gout involving toe of right foot, Acute gout involving toe of right foot, Acute on chronic combined systolic (congestive) and diastolic (congestive) heart failure ([address] Cancer ([address] Cerebrovascular disease, Clotting disorder ([address] Depression, Hyperlipidemia, Hypertension, Hyperthyroidism, Leg swelling, Lymphedema of both lower extremities, Peripheral vascular disease ([address] Renal failure, Thrombophlebitis, and Thyroid disease. ED TRIAGEVITALS  BP: (!) 115/58, Temp: 97.3 °F (36.3 °C), Pulse: 90, Resp: 20, SpO2: 96 %  HPI   Patient was admitted to hospital from nursing facility- she was hypoxic and brought to the ER for further evaluation.    She is an immunocompromised patient - with history of renal transplant. She is currently on Bipap- lethargic- receiving blood- all information obtained from review of records. She has been afebrile. She had a blood culture turn for Staph Epi   She is currently on Zosyn      Patient is known to ID service- 4/95/8325- she had a complicated hospital stay at that time - she is S/P COVID   She was treated for colitis- she had SBO. Her fungitell was positive- she was on diflucan  She had a UTI- was also treated for HCAP. Patient was then transferred to [facility] on 1/25/2022    ID has been asked to evaluate and manage antibiotics.    REVIEW OF SYSTEMS     CONSTITUTIONAL:   No fever, chills, weight loss  ALLERGIES:    No urticaria, hay fever,    EYES:     No blurry vision, loss of vision, eye pain  ENT:      No hearing loss, sore throat  CARDIOVASCULAR:   + HTN, + hyperlipidemia   RESPIRATORY:   No cough, sob  ENDOCRINE:    No increase thirst, urination , + thyroid disease   HEME-LYMPH:   No easy bruising or bleeding  GI:     No nausea, vomiting or diarrhea  :     No urinary complaints  NEURO:    No seizures, history of stroke, HA  MUSCULOSKELETAL:  No muscle aches or pain, no joint pain  SKIN:     No rash or itch  PSYCH:    + depression or anxiety    Medications Prior to Admission: apixaban (ELIQUIS) 5 MG TABS tablet, Take 5 mg by mouth 2 times daily Take in the morning and at bedtime for 3 months (started 2/19/2022)  atorvastatin (LIPITOR) 10 MG tablet, Take 10 mg by mouth daily  furosemide (LASIX) 20 MG tablet,   lisinopril (PRINIVIL;ZESTRIL) 5 MG tablet, take 1 tablet by mouth once daily  pregabalin (LYRICA) 75 MG capsule,   allopurinol (ZYLOPRIM) 100 MG tablet, Take 100 mg by mouth daily  metoprolol succinate (TOPROL XL) 50 MG extended release tablet, take 1 tablet by mouth once daily  desvenlafaxine succinate (PRISTIQ) 50 MG TB24 extended release tablet, Take 1 tablet by mouth every evening  cloZAPine (CLOZARIL) 50 MG tablet, Take 50 mg by mouth nightly  cycloSPORINE (SANDIMMUNE) 100 MG capsule, Take 100 mg by mouth 2 times daily  predniSONE (DELTASONE) 5 MG tablet, Take 5 mg by mouth daily  folic acid (FOLVITE) 1 MG tablet, Take 1 mg by mouth daily  docusate sodium (COLACE) 100 MG capsule, Take 100 mg by mouth 2 times daily  mycophenolate (CELLCEPT) 500 MG tablet, Take 1,000 mg by mouth 2 times daily  levothyroxine (SYNTHROID) 50 MCG tablet, Take 50 mcg by mouth daily. nystatin (MYCOSTATIN) 064389 UNIT/GM powder, Apply 3 times daily. ipratropium-albuterol (DUONEB) 0.5-2.5 (3) MG/3ML SOLN nebulizer solution, Inhale 3 mLs into the lungs every 6 hours as needed for Shortness of Breath  budesonide (PULMICORT) 0.5 MG/2ML nebulizer suspension, Take 2 mLs by nebulization 2 times daily for 10 days  ondansetron (ZOFRAN-ODT) 4 MG disintegrating tablet, Take 1 tablet by mouth every 8 hours as needed for Nausea or Vomiting  Cyanocobalamin (B-12 COMPLIANCE INJECTION) 1000 MCG/ML KIT, Inject as directed monthly  simvastatin (ZOCOR) 20 MG tablet, Take 20 mg by mouth nightly.     CURRENT MEDICATIONS     Current Facility-Administered Medications:     0.9 % sodium chloride infusion, , IntraVENous, PRN, Whitley Workmanon, APRN - CNP    lidocaine 1 % injection 5 mL, 5 mL, IntraDERmal, Once, Whitley Kiran, APRN - CNP    sodium chloride flush 0.9 % injection 5-40 mL, 5-40 mL, IntraVENous, 2 times per day, Whitley Kiran, APRN - CNP, 10 mL at 02/27/22 0841    sodium chloride flush 0.9 % injection 5-40 mL, 5-40 mL, IntraVENous, PRN, Whitley Kiran, APRN - CNP    0.9 % sodium chloride infusion, 25 mL, IntraVENous, PRN, Whitley Magno, APRN - CNP    heparin flush 100 UNIT/ML injection 100 Units, 1 mL, IntraVENous, 2 times per day, Arreaga Melvern, APRN - CNP    heparin flush 100 UNIT/ML injection 100 Units, 1 mL, IntraCATHeter, PRN, Arreaga Melvern, APRN - CNP    pantoprazole (PROTONIX) injection 40 mg, 40 mg, IntraVENous, BID, Tahir Pop Virgilioert, DO, 40 mg at 02/27/22 0840    0.9 % sodium chloride infusion, , IntraVENous, Continuous, KATINA Turner - CNP, Last Rate: 100 mL/hr at 02/27/22 0026, New Bag at 02/27/22 0026    sodium chloride flush 0.9 % injection 5-40 mL, 5-40 mL, IntraVENous, 2 times per day, Sybil Pereyra MD, 10 mL at 02/26/22 2143    sodium chloride flush 0.9 % injection 5-40 mL, 5-40 mL, IntraVENous, PRN, Merline Rudd Mikhail, MD    0.9 % sodium chloride infusion, 25 mL, IntraVENous, PRN, Merline Rudd Mikhail, MD    ondansetron (ZOFRAN-ODT) disintegrating tablet 4 mg, 4 mg, Oral, Q8H PRN **OR** ondansetron (ZOFRAN) injection 4 mg, 4 mg, IntraVENous, Q6H PRN, Merline Rudd Mikhail, MD    polyethylene glycol (GLYCOLAX) packet 17 g, 17 g, Oral, Daily PRN, Sybil Pereyra MD    acetaminophen (TYLENOL) tablet 650 mg, 650 mg, Oral, Q6H PRN, 650 mg at 02/27/22 0841 **OR** acetaminophen (TYLENOL) suppository 650 mg, 650 mg, Rectal, Q6H PRN, Merline Rudd Mikhail, MD    ipratropium-albuterol (DUONEB) nebulizer solution 1 ampule, 1 ampule, Inhalation, 4x daily, Sybil Pereyra MD, 1 ampule at 02/27/22 0910    allopurinol (ZYLOPRIM) tablet 100 mg, 100 mg, Oral, Daily, Merline Rudd Mikhail, MD, 100 mg at 02/27/22 0841    budesonide (PULMICORT) nebulizer suspension 500 mcg, 500 mcg, Nebulization, BID, Merline Rudd Mikhail, MD, 500 mcg at 02/27/22 0547    cloZAPine (CLOZARIL) tablet 50 mg, 50 mg, Oral, Nightly, Susannah Ramirez MD, 50 mg at 02/26/22 2143    cycloSPORINE (SANDIMMUNE) capsule 100 mg, 100 mg, Oral, BID, Merline Rudd Mikhail, MD, 100 mg at 02/27/22 0846    desvenlafaxine succinate (PRISTIQ) extended release tablet 50 mg, 50 mg, Oral, QPM, Susannah Ramirez MD, 50 mg at 02/26/22 2541    docusate sodium (COLACE) capsule 100 mg, 100 mg, Oral, BID, Susannah Ramirez MD, 100 mg at 90/85/52 6374    folic acid (FOLVITE) tablet 1 mg, 1 mg, Oral, Daily, Merline Rudd Mikhail, MD, 1 mg at 02/27/22 0841    levothyroxine (SYNTHROID) tablet 50 mcg, 50 mcg, Oral, Daily, He[place] L swelling 9/3/2020    Lymphedema of both lower extremities 9/3/2020    Peripheral vascular disease ([address]     Renal failure 2012    renal transplant    Thrombophlebitis     after c section delivery    Thyroid disease      SURGICAL HISTORY       Past Surgical History:   Procedure Laterality Date     SECTION  1985    one normal delivery    COLECTOMY N/A 1/15/2022    DIAGNOSTIC  LAPAROSCOPY LYSIS OF ADHESIONS performed by Ximena Chase MD at [address] [facility] [address] CATH LAB PROCEDURE      normal coronaries and  normal coronaries    DIALYSIS FISTULA CREATION  march and 2012    phase one and two patient will start dialysis when needed   [address]    transfemoral venous bypass grafts    IR IVC FILTER PLACEMENT W IMAGING  2022    IR IVC FILTER PLACEMENT W IMAGING 2022 [facility]ED[facility]    KIDNEY TRANSPLANT  2016    KIDNEY TRANSPLANT  2015    KIDNEY TRANSPLANT  2015    PARATHYROIDECTOMY  2006    left parathyroid  implant and parathyroidectomy     FAMILY HISTORY       Family History   Problem Relation Age of Onset    Diabetes Mother     Diabetes Father     Dementia Father      SOCIAL HISTORY       Social History     Socioeconomic History    Marital status:      Spouse name: None    Number of children: None    Years of education: None    Highest education level: None   Occupational History    None   Tobacco Use    Smoking status: Former Smoker     Packs/day: 1.00     Years: 20.00     Pack years: 20.00     Quit date: 10/1/2015     Years since quittin.4    Smokeless tobacco: Never Used   Vaping Use    Vaping Use: Never used   Substance and Sexual Activity    Alcohol use: No     Comment: 2 cups coffee per day    Drug use: No    Sexual activity: None   Other Topics Concern    None   Social History Narrative    None     Social Determinants of Health     Financial Resource Strain:     Difficulty of Paying Living Expenses: Not on file   Food Insecurity:     Worried About Running Out of Food in the Last Year: Not on file    Vannessa of Food in the Last Year: Not on file   Transportation Needs:     Lack of Transportation (Medical): Not on file    Lack of Transportation (Non-Medical):  Not on file   Physical Activity:     Days of Exercise per Week: Not on file    Minutes of Exercise per Session: Not on file   Stress:     Feeling of Stress : Not on file   Social Connections:     Frequency of Communication with Friends and Family: Not on file    Frequency of Social Gatherings with Friends and Family: Not on file    Attends Restorationist Services: Not on file    Active Member of [address] [place] [address] [employer] or Organizations: Not on file    Attends Club or Organization Meetings: Not on file    Marital Status: Not on file   Intimate Partner Violence:     Fear of Current or Ex-Partner: Not on file    Emotionally Abused: Not on file    Physically Abused: Not on file    Sexually Abused: Not on file   Housing Stability:     Unable to Pay for Housing in the Last Year: Not on file    Number of [place] in the Last Year: Not on file    Unstable Housing in the Last Year: Not on file     · From nursing facility     [address]       ED Triage Vitals   BP Temp Temp Source Pulse Resp SpO2 Height Weight   02/25/22 0111 02/25/22 0111 02/25/22 0111 02/25/22 0111 02/25/22 0111 02/25/22 0111 02/25/22 0208 02/25/22 0208   133/86 97.8 °F (36.6 °C) Oral 99 16 96 % 5' 2\" (1.575 m) 220 lb (99.8 kg)     Vitals:    Vitals:    02/27/22 0015 02/27/22 0841 02/27/22 0912 02/27/22 1128   BP: (!) 97/51 (!) 88/42  (!) 115/58   Pulse: 89 88  90   Resp: 20 20 15 20   Temp: 97.7 °F (36.5 °C) 98.3 °F (36.8 °C)  97.3 °F (36.3 °C)   TempSrc: Infrared Infrared  Infrared   SpO2: 100% 98%  96%   Weight:       Height:         Physical Exam   Constitutional/General: Lethargic in bed- on bipap  Head: NC/AT  Eyes: PERRL, EOMI  Neck: Supple, full ROM,    Pulmonary: Lungs intracranial abnormality. MRI would be useful if symptoms persist. Inflammatory changes of left greater than right mastoid air cells and left sphenoid sinus similar to prior MRI. RECOMMENDATIONS: Unavailable     NM LUNG VENT/PERFUSION (VQ)    Result Date: 2/25/2022  EXAMINATION: NUCLEAR MEDICINE VENTILATION PERFUSION SCAN. 2/25/2022 TECHNIQUE: 3.5 millicuries aerosolized Tc99m DTPA was administered via mask prior to planar imaging of the lungs in multiple projections. Then, 4.2 millicuries of Tc 38D MAA was administered intravenously prior to planar imaging of the lungs in similar projections. COMPARISON: Chest CT February 25, 2022 . HISTORY: ORDERING SYSTEM PROVIDED HISTORY: R/O PE TECHNOLOGIST PROVIDED HISTORY: Reason for exam:->R/O PE What reading provider will be dictating this exam?->CRC FINDINGS: PERFUSION: Distribution of radiotracer is homogeneous. No segmental defects identified. VENTILATION: Ventilation images are unremarkable. CHEST CT: Small infiltrate or atelectasis posterior right lung. Negative for pulmonary embolus. CT ABDOMEN PELVIS W IV CONTRAST Additional Contrast? None    Result Date: 2/25/2022  EXAMINATION: CT OF THE ABDOMEN AND PELVIS WITH CONTRAST 2/25/2022 3:08 am TECHNIQUE: CT of the abdomen and pelvis was performed with the administration of intravenous contrast. Multiplanar reformatted images are provided for review. Dose modulation, iterative reconstruction, and/or weight based adjustment of the mA/kV was utilized to reduce the radiation dose to as low as reasonably achievable. COMPARISON: 01/17/2022 HISTORY: ORDERING SYSTEM PROVIDED HISTORY: Abdominal distention, AMS TECHNOLOGIST PROVIDED HISTORY: Reason for exam:->Abdominal distention, AMS Additional Contrast?->None Decision Support Exception - unselect if not a suspected or confirmed emergency medical condition->Emergency Medical Condition (MA) FINDINGS: Many images are partially degraded by patient motion related artifact. Low-attenuation focus in the central aspect of the left lobe of the liver as well as a tiny low-attenuation focus in the caudate lobe. There is also a tiny low-attenuation focus in the extreme inferior aspect of the right lobe. These are difficult to definitively characterize due to their small size but likely represent small hepatic cysts. Gallbladder is unremarkable. Spleen is normal in size. No evidence of acute pancreatitis. There is a E small exophytic cystic appearing lesion along the anterior aspect of the body of the pancreas measuring 7 Hounsfield units and 1.4 cm. This is unchanged dating back to 11/17/2021. This is also favored to be incidental but could be followed. No adrenal mass. No hydronephrosis. The native kidneys remains significantly atrophic and contain numerous cystic lesions. Some of these are hyperdense but also unchanged dating back to the 11/17/2021 exam and favored to represent hyperdense or hemorrhagic cysts. Small renal cortical calcifications and or nonobstructing stones. A right lower quadrant renal transplant demonstrates no hydronephrosis. Small hiatal hernia. Mild fluid distention of a few small bowel loops in the upper abdomen. No bowel obstruction. The cecum is mobile. The appendix is not identified with certainty. There is some localized thickening of the proximal ascending colon. This does appear to be new compared to the previous examination. Moderate stool in the distal colon. Residual contrast in the colon likely due to contrast administered on the prior examination. Uterus is atrophic. Urinary bladder is largely decompressed with Chin catheter. Minimal gas in the bladder likely due to the catheter. Partial visualization of femoral to femoral bypass graft. Degenerative changes are present in the spine and pelvis. Please see separate dictated report for CT of the chest.     Thickening of the proximal ascending colon.   Given the short-term change, this is favored to represent a localized area of colitis likely infectious or inflammatory. Neoplasia thought less likely but follow-up to resolution would be recommended. Moderate stool in the distal colon. Minimal distention of several proximal small bowel loops likely incidental or due to mild enteritis. Small hiatal hernia. Other chronic appearing findings. RECOMMENDATIONS: Unavailable     IR GUIDED IVC FILTER PLACEMENT    Result Date: 2/26/2022  EXAMINATION: INFERIOR VENA CAVA (IVC) FILTER INSERTION 2/26/2022 Procedural Personnel: Mario Strong MD HISTORY: Indication: Bleeding with IV heparin ORDERING SYSTEM PROVIDED HISTORY: IVC filter TECHNOLOGIST PROVIDED HISTORY: Reason for exam:->IVC filter Anticoagulation contraindicated SEDATION: None CONTRAST: Contrast agent: Isovue 320 Contrast volume: 30mL FLUOROSCOPY DOSE AND TYPE OR TIME AND EXPOSURES: Fluoroscopy time/Number of Images: Fluoroscopy time 1.5 minutes. Reference air [place] kerma: [id number] PROCEDURE: Informed consent for the procedure including risks, benefits and alternatives was obtained and time-out was performed prior to the procedure. Universal protocol was followed. A suitable skin site was prepared and draped using all elements of maximal sterile barrier technique including sterile gloves, sterile gown, cap, mask, large sterile sheet, sterile ultrasound probe cover, hand hygiene, and cutaneous antisepsis. Time-out was called and the patient's order and identity were verified. Local anesthesia was administered. The vessel was sonographically evaluated and judged appropriate for access. Real time ultrasound was used to visualize needle entry into the vessel and an ultrasound image was stored in PACS. Vein accessed: Right common femoral vein. Access technique: Micropuncture set with 21 gauge needle A 0.035 guide wire was used to place a catheter into the inferior vena cava. A vena cavogram was performed.   A Bard [place] filter was deployed in routine fashion in the infrarenal IVC under fluoroscopic guidance. The sheath was removed and hemostasis was achieved with manual compression. A sterile dressing was applied. Patient tolerated procedure well. Complications: No immediate complications. Standardized report: [id number]_[email or web address] [id number] FINDINGS: US: The access vein is patent. Inferior Vena Cavogram: The vena cava is patent and normal in size without thrombus or anomalies. Post-placement imaging: Spot filmdemonstrates the filter in the infrarenalvena cava with less than 15 degrees tilt from the vena cava access. Successful vena cava filter placement. PLAN: Retrieval intent (MIPS): The filter is potentially retrievable. Plan/Timing For Retrieval: Ordering Physician/Contact For Retrieval Plan: Harika Dominguez     XR CHEST 1 VIEW    Result Date: 2/25/2022  EXAMINATION: ONE XRAY VIEW OF THE CHEST 2/25/2022 2:49 pm COMPARISON: None. HISTORY: ORDERING SYSTEM PROVIDED HISTORY: needs to be done with VQ scan TECHNOLOGIST PROVIDED HISTORY: Last chest Xray done more than 24 hours ago Reason for exam:->needs to be done with VQ scan FINDINGS: The heart is mildly enlarged. There are no findings of failure. The lungs are clear. There is no focal infiltrate or effusion. .     1. Mild cardiomegaly. There is no evidence of failure or pneumonia. CTA PULMONARY W CONTRAST    Result Date: 2/25/2022  EXAMINATION: CTA OF THE CHEST 2/25/2022 3:08 am TECHNIQUE: CTA of the chest was performed after the administration of intravenous contrast.  Multiplanar reformatted images are provided for review. MIP images are provided for review. Dose modulation, iterative reconstruction, and/or weight based adjustment of the mA/kV was utilized to reduce the radiation dose to as low as reasonably achievable.  COMPARISON: Portable chest dated 01/22/2022 and CT dated 01/17/2022 HISTORY: ORDERING SYSTEM PROVIDED HISTORY: Hypoxic, SOB TECHNOLOGIST PROVIDED HISTORY: Reason for exam:->Hypoxic, SOB Decision Support Exception - unselect if not a suspected or confirmed emergency medical condition->Emergency Medical Condition (MA) FINDINGS: Pulmonary Arteries: Assessment is limited due to patient motion artifact. A small or peripheral embolism would be difficult to exclude but no large or central embolism identified. Mediastinum: Moderately severe cardiomegaly is similar to previous. No pericardial effusion. No aortic dissection. Scattered vascular calcifications. Normal-size lymph nodes without adenopathy by size criteria. Lungs/pleura: No pneumothorax. Mild atelectasis in the right greater than left lung base. Otherwise, there has been significant improvement in aeration of the lungs since previous. Upper Abdomen: Partial visualization of the upper right kidney with cystic appearing foci as well as a partially visualized hyperdense lesion likely representing a hyperdense proteinaceous cyst and similar to previous but continued follow-up would be useful. Visualized portions of the upper abdomen demonstrate no acute abnormality. Small hiatal hernia. Soft Tissues/Bones: Degenerative changes are scattered in the spine. Allowing for patient motion artifact, no identified pulmonary embolism. Significant improvement in aeration of the lungs with some residual areas of presumed atelectasis in the right greater than left base. Cardiomegaly.  RECOMMENDATIONS: Unavailable     IR ULTRASOUND GUIDANCE VASCULAR ACCESS    Result Date: 2/26/2022  EXAMINATION: INFERIOR VENA CAVA (IVC) FILTER INSERTION 2/26/2022 Procedural Personnel: Kishore Carlin MD HISTORY: Indication: Bleeding with IV heparin ORDERING SYSTEM PROVIDED HISTORY: IVC filter TECHNOLOGIST PROVIDED HISTORY: Reason for exam:->IVC filter Anticoagulation contraindicated SEDATION: None CONTRAST: Contrast agent: Isovue 320 Contrast volume: 30mL FLUOROSCOPY DOSE AND TYPE OR TIME AND EXPOSURES: Fluoroscopy time/Number of Images: Fluoroscopy time 1.5 minutes. Reference [facility] kerma: [id number] PROCEDURE: Informed consent for the procedure including risks, benefits and alternatives was obtained and time-out was performed prior to the procedure. Universal protocol was followed. A suitable skin site was prepared and draped using all elements of maximal sterile barrier technique including sterile gloves, sterile gown, cap, mask, large sterile sheet, sterile ultrasound probe cover, hand hygiene, and cutaneous antisepsis. Time-out was called and the patient's order and identity were verified. Local anesthesia was administered. The vessel was sonographically evaluated and judged appropriate for access. Real time ultrasound was used to visualize needle entry into the vessel and an ultrasound image was stored in PACS. Vein accessed: Right common femoral vein. Access technique: Micropuncture set with 21 gauge needle A 0.035 guide wire was used to place a catheter into the inferior vena cava. A vena cavogram was performed. A Bard [place] filter was deployed in routine fashion in the infrarenal IVC under fluoroscopic guidance. The sheath was removed and hemostasis was achieved with manual compression. A sterile dressing was applied. Patient tolerated procedure well. Complications: No immediate complications. Standardized report: [id number]_IVCFilterInsertion2.0 [id number] FINDINGS: US: The access vein is patent. Inferior Vena Cavogram: The vena cava is patent and normal in size without thrombus or anomalies. Post-placement imaging: Spot filmdemonstrates the filter in the infrarenalvena cava with less than 15 degrees tilt from the vena cava access. Successful vena cava filter placement. PLAN: Retrieval intent (MIPS): The filter is potentially retrievable.  Plan/Timing For Retrieval: Ordering Physician/Contact For Retrieval Plan: [facility] MODIFIED BARIUM SWALLOW W VIDEO    Result Date: 2/26/2022  EXAMINATION: MODIFIED BARIUM SWALLOW WAS PERFORMED IN CONJUNCTION WITH SPEECH PATHOLOGY SERVICES TECHNIQUE: Fluoroscopic evaluation of the swallowing mechanism was performed using cineradiography with multiple consistency of barium product in conjunction with speech pathology services. FLUOROSCOPY DOSE AND TYPE OR TIME AND EXPOSURES: Fluoroscopy time 2.1 minutes. Air kerma 5.49 mGy COMPARISON: None HISTORY: ORDERING SYSTEM PROVIDED HISTORY: dysphagia, aspriation pna? TECHNOLOGIST PROVIDED HISTORY: Reason for exam:->dysphagia, aspriation pna? FINDINGS: There was oral delay and pharyngeal delay. Laryngeal elevation was adequate. There was retention in the piriform sinuses but not the vallecula. Transient laryngeal penetration is seen with thin liquid barium. Otherwise, no aspiration or laryngeal penetration is seen. Strands it laryngeal penetration with thin liquid barium. No evidence of aspiration. Please see separate speech pathology report for full discussion of findings and recommendations. RECOMMENDATIONS: Unavailable     US DUP LOWER EXTREMITIES BILATERAL VENOUS    Result Date: 2/25/2022  EXAMINATION: DUPLEX VENOUS ULTRASOUND OF THE BILATERAL LOWER EXTREMITIES2/25/2022 2:47 pm TECHNIQUE: Duplex ultrasound using B-mode/gray scaled imaging, Doppler spectral analysis and color flow Doppler was obtained of the deep venous structures of the lower bilateral extremities. COMPARISON: None. HISTORY: ORDERING SYSTEM PROVIDED HISTORY: R/O DVT TECHNOLOGIST PROVIDED HISTORY: Reason for exam:->R/O DVT What reading provider will be dictating this exam?->CRC FINDINGS: Right lower extremity:  The visualized veins of the bilateral lower extremities are patent and free of echogenic thrombus. The veins demonstrate good compressibility with normal color flow study and spectral analysis. Left lower extremity: There are incompletely occlusive thrombi within the mid and distal left femoral vein and the popliteal vein.   They have developed in the interim since the prior lower extremity Doppler from 11/17/2021.     1. INCOMPLETELY OCCLUSIVE THROMBI in the mid and distal LEFT femoral vein and within the popliteal vein. They have developed in the interim since the previous study from 11/17/2021. 2. No evidence of DVT in the right lower extremity. RECOMMENDATIONS: Unavailable     LABS  Recent Labs     02/25/22 1948 02/25/22 1948 02/26/22 0454 02/27/22  0344 02/27/22  0953   WBC 6.4  --  6.4 5.6  --    HGB 7.8*   < > 7.9* 7.1* 7.4*   HCT 27.2*  --  27.6* 25.5*  --    .9*  --  112.7* 114.9*  --      --  136 132  --     < > = values in this interval not displayed. Recent Labs     02/25/22 0122 02/25/22 0122 02/26/22 0454 02/26/22 0454 02/27/22  0344     --  140  --  139   K 4.2   < > 4.7  --  4.0   CL 98   < > 103   < > 106   CO2 33*   < > 27   < > 21*   BUN 29*  --  30*  --  31*   CREATININE 1.7*  --  1.6*  --  1.7*   GFRAA 36  --  39  --  36   LABGLOM 30  --  32  --  30   GLUCOSE 113*  --  106*  --  100*   PROT 6.7  --  5.2*  --  5.0*   LABALBU 3.5  --  2.6*  --  2.2*   CALCIUM 10.6*  --  9.5  --  9.1   BILITOT 1.2  --  0.7  --  0.7   ALKPHOS 105*  --  74  --  72   AST 16  --  29  --  11   ALT 10  --  9  --  6    < > = values in this interval not displayed. No results for input(s): PROCAL in the last 72 hours. Lab Results   Component Value Date    CRP 18.3 (H) 01/23/2022    CRP 34.6 (H) 11/17/2021     Lab Results   Component Value Date    SEDRATE 61 (H) 01/23/2022    SEDRATE 62 (H) 11/20/2021     No results found for: [id number]  Lab Results   Component Value Date    COVID19 Not Detected 02/25/2022     COVID-19/MARINA-COV2 LABS  Recent Labs     02/25/22  0122 02/26/22  0454 02/27/22  0344   AST 16 29 11   ALT 10 9 6     MICROBIOLOGY:     Cultures :   Recent Labs     02/25/22  0112   BC 24 Hours no growth  Gram stain performed from blood culture bottle media  Gram positive cocci in clusters  *     No results for input(s): ORG in the last 72 hours.   Recent Labs 02/25/22  0112   BLOODCULT2 24 Hours no growth       Lab Results   Component Value Date    [facility]  02/25/2022     24 Hours no growth  Gram stain performed from blood culture bottle media  Gram positive cocci in clusters      BC 5 Days no growth 01/23/2022    BC 5 Days no growth 01/21/2022    ORG Enterococcus faecalis 01/12/2022    ORG Klebsiella pneumoniae ssp pneumoniae 11/17/2021    ORG Staphylococcus coagulase-negative 11/17/2021    ORG Klebsiella pneumoniae ssp pneumoniae 11/17/2021     Lab Results   Component Value Date    BLOODCULT2 24 Hours no growth 02/25/2022    BLOODCULT2 5 Days no growth 01/24/2022    BLOODCULT2 5 Days no growth 01/21/2022    ORG Enterococcus faecalis 01/12/2022    ORG Klebsiella pneumoniae ssp pneumoniae 11/17/2021    ORG Staphylococcus coagulase-negative 11/17/2021    ORG Klebsiella pneumoniae ssp pneumoniae 11/17/2021     WOUND/ABSCESS   Date Value Ref Range Status   01/24/2022 Growth not present  Final     Urine Culture, Routine   Date Value Ref Range Status   01/23/2022 Growth not present  Final   01/12/2022 >100,000 CFU/ml  Final   11/17/2021 <10,000 CFU/mL  Mixed gram positive organisms   (A)  Final   11/17/2021 >100,000 CFU/ml  Final     MRSA Culture Only   Date Value Ref Range Status   11/17/2021 Methicillin resistant Staph aureus not isolated  Final     FINAL IMPRESSION    Patient is a 73 y.o. female who presented with   Chief Complaint   Patient presents with    Altered Mental Status     hx of bipolar, Hx liver failure/renal failure, No history of AMS, no HX of dialysis, was hypoxic at [facility] 76% on room air was placed on 3L NC 93%.      and admitted for Dehydration [E86.0]  Colitis [K52.9]  Hypoxia [R09.02]  Elevated troponin [R77.8]  Acute respiratory failure with hypoxia (HCC) [J96.01]  Altered mental status, unspecified altered mental status type [R41.82]  · Bacteremia- staph epi  · Aspiration pneumonia   · History of vena cava filter and AV fistula   · Klebsiella and CONS bacteremia in 11/17/2021  · Coccyx wound? Plan:    · On zosyn   · Give vancomycin x1   · Check random level in am  · Check procal  · Repeat blood cultures-   · Check CLEMENCIA   · Check fungitell   · General surgery to evaluate mckenzie-rectal area  · Request records from [facility] and labs were reviewed per medical records and any ID pertinent labs were addressed with the patient. The patient/FAMILY  was educated about the diagnosis, prognosis, indications, risks and benefits of treatment. An opportunity to ask questions was given to the patient/FAMILY and questions were answered. Thank you for involving me in the care of Ronni Dakins. Please do not hesitate to call for any questions or concerns. Electronically signed by KATINA Perez on 2/27/2022 at 12:20 PM        As above     This is a face to face encounter with Ronni Dakins on 02/27/22. I have performed and participated in the history, exam, medical decision making, and  POC with the NURSE PRACTITIONER, KATINA Perez. Chart reviewed respiratory failure h/o covid   cons bacteremia   ckd/kidney transplant on immunosuppressives  Gi seeing for colitis/anemia  Pulm following  Left ue dvt/thrombus  Perianal lesion? Surgery eval ?    rx hcap/aspiration/cons bacteremia      vancomycin (VANCOCIN) 1,500 mg in dextrose 5 % 300 mL IVPB, Once  cycloSPORINE (SANDIMMUNE) capsule 100 mg, BID  predniSONE (DELTASONE) tablet 5 mg, Daily  mycophenolate (CELLCEPT) capsule 1,000 mg, BID  piperacillin-tazobactam (ZOSYN) 3,375 mg in dextrose 5 % 50 mL IVPB extended infusion (mini-bag), Q8H     Check cx      Imaging and labs were reviewed. Thank you for involving me in the care of Ronni Dakins. Please do not hesitate to call for any questions or concerns.     Electronically signed by Raul Hernandez MD on 2/27/2022 at 3:40 PM

## 2022-02-27 NOTE — PROGRESS NOTES
Assisted tech during US study, patient was awake momentarily and easily fell back asleep. Continued with BiPAP with no distress or desaturation, maintaining SpO2 @94%. Electronically signed by Edwina Sy RN on 2/27/2022 at 1:07 PM

## 2022-02-27 NOTE — PROGRESS NOTES
[address] [place], [place] [address]ist   Progress Note    Admitting Date and Time: 2/25/2022  1:08 AM  Admit Dx: Dehydration [E86.0]  Colitis [K52.9]  Hypoxia [R09.02]  Elevated troponin [R77.8]  Acute respiratory failure with hypoxia ([address] [J96.01]  Altered mental status, unspecified altered mental status type [R41.82]    Subjective:    Patient is lethargic this morning. She will wake up to answer questions but falls back to sleep. Spoke with Respiratory Therapy and patient wore her Bipap during the night. Patient will be placed back on Bipap and ABGs were ordered. Blood culture bottle one sent on 2/25 initially showed no growth but is positive for Gram positive cocci. ROS: denies fever, chills, cp, sob, n/v, HA unless stated above.      magnesium sulfate  2,000 mg IntraVENous Once    lidocaine  5 mL IntraDERmal Once    sodium chloride flush  5-40 mL IntraVENous 2 times per day    heparin flush  1 mL IntraVENous 2 times per day    pantoprazole  40 mg IntraVENous BID    sodium chloride flush  5-40 mL IntraVENous 2 times per day    ipratropium-albuterol  1 ampule Inhalation 4x daily    allopurinol  100 mg Oral Daily    budesonide  500 mcg Nebulization BID    cloZAPine  50 mg Oral Nightly    cycloSPORINE  100 mg Oral BID    desvenlafaxine succinate  50 mg Oral QPM    docusate sodium  100 mg Oral BID    folic acid  1 mg Oral Daily    levothyroxine  50 mcg Oral Daily    predniSONE  5 mg Oral Daily    mycophenolate  1,000 mg Oral BID    piperacillin-tazobactam  3,375 mg IntraVENous Q8H    sodium chloride  25 mL IntraVENous Q8H     sodium chloride, , PRN  sodium chloride flush, 5-40 mL, PRN  sodium chloride, 25 mL, PRN  heparin flush, 1 mL, PRN  sodium chloride flush, 5-40 mL, PRN  sodium chloride, 25 mL, PRN  ondansetron, 4 mg, Q8H PRN   Or  ondansetron, 4 mg, Q6H PRN  polyethylene glycol, 17 g, Daily PRN  acetaminophen, 650 mg, Q6H PRN   Or  acetaminophen, 650 mg, Q6H PRN Objective:    BP (!) 97/51   Pulse 89   Temp 97.7 °F (36.5 °C) (Infrared)   Resp 15   Ht 5' 2\" (1.575 m)   Wt 220 lb (99.8 kg)   SpO2 100%   BMI 40.24 kg/m²   General Appearance: lethargic but awakens to verbal commands and oriented to person, place and time, periods of confusion and in no acute distress  Skin: warm and dry, pallor  Head: normocephalic and atraumatic  Eyes: pupils equal, round, and reactive to light, conjunctivae normal  Neck: neck supple and non tender without mass   Pulmonary/Chest: diminished bilaterally- no wheezes, rales or rhonchi,  no respiratory distress  Cardiovascular: normal rate, normal S1 and S2   Abdomen: soft, non-tender, non-distended, normal bowel sounds  Extremities: no cyanosis, no clubbing and 1+ bilateral lower extremity edema, 2+ left upper extremity edema with seeping  Musculoskeletal:  Left 2/5 strength, Right 4/5 strength  Neurologic: weakness left arm and speech normal      Recent Labs     02/25/22 0122 02/26/22 0454 02/27/22  0344    140 139   K 4.2 4.7 4.0   CL 98 103 106   CO2 33* 27 21*   BUN 29* 30* 31*   CREATININE 1.7* 1.6* 1.7*   GLUCOSE 113* 106* 100*   CALCIUM 10.6* 9.5 9.1       Recent Labs     02/25/22 0122 02/26/22 0454 02/27/22  0344   ALKPHOS 105* 74 72   PROT 6.7 5.2* 5.0*   LABALBU 3.5 2.6* 2.2*   BILITOT 1.2 0.7 0.7   AST 16 29 11   ALT 10 9 6       Recent Labs     02/25/22 1948 02/25/22 1948 02/26/22 0454 02/27/22  0344 02/27/22  0953   WBC 6.4  --  6.4 5.6  --    RBC 2.43*  --  2.45* 2.22*  --    HGB 7.8*   < > 7.9* 7.1* 7.4*   HCT 27.2*  --  27.6* 25.5*  --    .9*  --  112.7* 114.9*  --    MCH 32.1  --  32.2 32.0  --    MCHC 28.7*  --  28.6* 27.8*  --    RDW 17.2*  --  17.1* 17.2*  --      --  136 132  --    MPV 12.4*  --  12.8* 13.0*  --     < > = values in this interval not displayed. Radiology:   IR GUIDED IVC FILTER PLACEMENT   Final Result   Successful vena cava filter placement.       PLAN: Retrieval intent (MIPS): The filter is potentially retrievable. Plan/Timing For Retrieval:      Ordering Physician/Contact For Retrieval Plan: Scarlet Murguia         IR ULTRASOUND GUIDANCE VASCULAR ACCESS   Final Result   Successful vena cava filter placement. PLAN:   Retrieval intent (MIPS): The filter is potentially retrievable. Plan/Timing For Retrieval:      Ordering Physician/Contact For Retrieval Plan: Scarlet Murguia         FL MODIFIED BARIUM SWALLOW W VIDEO   Final Result   Strands it laryngeal penetration with thin liquid barium. No evidence of   aspiration. Please see separate speech pathology report for full discussion of findings   and recommendations. RECOMMENDATIONS:   Unavailable         US DUP LOWER EXTREMITIES BILATERAL VENOUS   Final Result   1. INCOMPLETELY OCCLUSIVE THROMBI in the mid and distal LEFT femoral vein and   within the popliteal vein. They have developed in the interim since the   previous study from 11/17/2021.   2. No evidence of DVT in the right lower extremity. RECOMMENDATIONS:   Unavailable         NM LUNG VENT/PERFUSION (VQ)   Final Result   Negative for pulmonary embolus. XR CHEST 1 VIEW   Final Result   1. Mild cardiomegaly. There is no evidence of failure or pneumonia. CT HEAD WO CONTRAST   Final Result   No acute intracranial abnormality. MRI would be useful if symptoms persist.      Inflammatory changes of left greater than right mastoid air cells and left   sphenoid sinus similar to prior MRI. RECOMMENDATIONS:   Unavailable         CTA PULMONARY W CONTRAST   Final Result   Allowing for patient motion artifact, no identified pulmonary embolism. Significant improvement in aeration of the lungs with some residual areas of   presumed atelectasis in the right greater than left base. Cardiomegaly.       RECOMMENDATIONS:   Unavailable         CT ABDOMEN PELVIS W IV CONTRAST Additional Contrast? None   Final Result Thickening of the proximal ascending colon. Given the short-term change,   this is favored to represent a localized area of colitis likely infectious or   inflammatory. Neoplasia thought less likely but follow-up to resolution   would be recommended. Moderate stool in the distal colon. Minimal distention of several proximal small bowel loops likely incidental or   due to mild enteritis. Small hiatal hernia. Other chronic appearing findings. RECOMMENDATIONS:   Unavailable             Assessment:  Principal Problem:    Acute respiratory failure with hypoxia (HCC)  Active Problems:    Hypoxia  Resolved Problems:    * No resolved hospital problems. *      Plan:  1. Acute respiratory failure with hypoxia:  Pulse ox was 76% on room air upon arrival. CTA of the chest showed no large or cental embolism but limited due to patient motion artifact. VQ scan was negative for PE but showed small infiltrate vs atelectasis. Respiratory film array was negative. Likely secondary to aspiration pneumonitis. Patient was lethargic this am.  Bipap applied and ABGs ordered. 2. Acute on chronic renal failure: Creatinine was 1.7 on admission and trended down to 1.6 and is back up to 1.7. She had a CTA of the chest on 2/25 and received IV fluid bolus. She had an IVC filter inserted on 2/26 and received contrast.  She is on continuous IV fluids and Nephrology on consult. Home Lasix and lisinopril have been on hold. 3. Bacteremia:  Blood culture bottle one sent on 2/25 is growing Gram positive cocci in clusters, bottle 2 with no growth to date. Consult ID. 4. Anemia:  GI following. Hgb was 8.9 on admission and was previously 10.7 last month. Hgb is 7.1 today and patient ordered one unit of PRBCs. Cardiology gave cardiac clearance for GI procedure.   Possible EGD if overt bleeding otherwise needs respiratory status improved first.   5. DVT:  Incompletely occlusive thrombi in the mid and distal left femoral vein and within the popliteal vein. Was started on a Heparin drip but stopped due to concern for bleeding. An IVC filter was inserted by IR on 2/26. 6. Colitis:  Recent laparoscopic surgery with lysis of adhesions with Dr. Andres Liao on 1/15/22 that did not reveal any ischemic bowel. Patient was transferred to [facility] during her January admission. Records requested She received Flagyl and Cefepime on 2/25. Continue  Zosyn. 7. Paroxysmal atrial fibrillation:  Home Eliquis is on hold. Heparin drip was infusing but stopped due to concern for bleeding. She is currently rate controlled and in sinus rhythm. Metoprolol remains on hold due to low blood pressure. 8. Elevated high sensitivity troponin:  Likely secondary due to demand ischemia in the setting of anemia. Moderate to severe cardiomegaly on CTA of the chest. High sensitivity levels: 191->176. Last Echo on 1/19/22 with an EF of 36-03%, Stage I diastolic dysfunction. 9. COPD:  Continue aerosols. 10. Bipolar disorder: Continue Pristiq and Clozaril. 11. Pancreatic lesion and Hepatic cysts:  GI following. Pancreatic lesion is 1.4 cm and has been present since 11/17/21Triphasic CT or MRI when condition improves. 12. Aspiration pneumonitis: S/p barium swallow evaluation on 2/26 with recommendations of minced and moist consistency solids and nectar thick liquids. Continue Zosyn   13. History of renal transplant: Continue cyclosporine, cellcept and prednisone. 14. Hypothyroidism: Continue Synthroid. 15. Delirium and confusion:  CT of the head on 2/25 with no acute intracranial abnormality. She is lethargic this am.  Patient placed back on Bipap and ABGs ordered. 16. Obstructive sleep apnea:  Continue Bipap. 17. Left arm edema:  Check ultrasound of the left upper extremity. Elevate arm.    18. Patient's concern for UTI:  Patient was concerned about having a possible UTI but urinalysis sent on 2/25 with rare bacteria, 1-3 WBCs, no leukocytes and negative nitrites. She was treated last month for an Enterococcus faecalis UTI. Repeat urinalysis and culture sent. NOTE: This report was transcribed using voice recognition software. Every effort was made to ensure accuracy; however, inadvertent computerized transcription errors may be present.      Electronically signed by KATINA Joshua CNP on 2/27/2022 at 10:36 AM

## 2022-02-27 NOTE — PROGRESS NOTES
PROGRESS NOTE    [facility]  Dr. Enio Mckenzie M.D., Dr. Allison Galeas M.D., Joey Paz D.O.,  Delano Blizzard, M.D., Leilani Griffin D.O., and Alejandrina Case M.D. Shannon Sharma  73 y.o.  female    SUBJECTIVE:  Pt on CPAP/BIPAP this am.  Appears lethargic. No outward bleeding reported.     OBJECTIVE: Lethargic with CPAP/BIPAP on    BP (!) 97/51   Pulse 89   Temp 97.7 °F (36.5 °C) (Infrared)   Resp 15   Ht 5' 2\" (1.575 m)   Wt 220 lb (99.8 kg)   SpO2 100%   BMI 40.24 kg/m²     HEENT:PEERL, no icterus  Heart: RRR  Lungs: coarse, no wheeze  Abd.: soft, NT, ND, BS +, no G/R, obese      Stool (measured) : 0 mL  Lab Results   Component Value Date    WBC 5.6 02/27/2022    HGB 7.1 02/27/2022    HCT 25.5 02/27/2022    .9 02/27/2022    RDW 17.2 02/27/2022     02/27/2022     Lab Results   Component Value Date     02/27/2022    K 4.0 02/27/2022    K 4.7 02/26/2022     02/27/2022    CO2 21 02/27/2022    BUN 31 02/27/2022    CREATININE 1.7 02/27/2022    CALCIUM 9.1 02/27/2022    PROT 5.0 02/27/2022    LABALBU 2.2 02/27/2022    BILITOT 0.7 02/27/2022    ALKPHOS 72 02/27/2022    AST 11 02/27/2022    ALT 6 02/27/2022     Lab Results   Component Value Date    LIPASE 20 02/25/2022     No results found for: AMYLASE    ASSESSMENT/PLAN:  Patient Active Problem List   Diagnosis    Peripheral vascular disease [facility]    Depression    Clotting disorder ([facility]    Abnormal EKG    Cancer ([address]    Over weight    S/p cadaver renal transplant    ESRD (end stage renal disease) ([facility]    Non morbid obesity due to excess calories    Hypertension    Leg swelling    Lymphedema of both lower extremities    Acute gout involving toe of right foot    Sepsis secondary to UTI ([address]    Acute kidney injury superimposed on CKD ([address]    Thyroid disease    Acute on chronic combined systolic (congestive) and diastolic (congestive) heart failure (HCC)    Sepsis ([address]    Acute [facility] failure [facility]    CECILY (acute kidney injury) [facility]    Ischemic bowel disease ([address]    Altered mental status    Coma ([address]    Acute respiratory failure with hypoxia ([facility]    Hypoxia     1.  Colitis on CT -- new findings, had recent pneumotosis and surgery 1/22. Moderate stool. Stool studies ordered and pt on antibiotics. Pt not a good candidate at this time given respiratory status and new DVT in LLE. Will need colonoscopy in the near future when able to tolerate prep and sedation.     2.  Anemia  Hg baseline has been 10-11 range. Now lower in 7-8 range. No gross bleeding reported but monitor closely with serial Hg and 2 units of PRBC on hold  Pt thinks had colonoscopy in remote past but no record  Pt at increased risk of respiratory failure for any procedure at this time and has significantly elevated troponin and BNP and would cardiac clearance prior to procedure. Pt on heparin drip for DVT, this would need held for any procedure. Pt had IVC filter placed 2/26/22.     3. 1.4cm pancreatic lesion -- stable since 11/2021. Consider triphasic CT or MRI when in better breathing condition. Will check CA 19-9.       4.  Liver cysts -- likely benign but will plan triple phase CT or MRI per above when pt condition improved. Will check AFP.     5.  Respiratory failure  -Patient requiring noninvasive positive pressure ventilation  -Per admitting/pulmonology     6. history of renal transplant  -Per admitting/nephrology     7.  Multiple comorbidities -COPD, HTN, HLD, BPD, COPD, CHF, hypothyroidism, etc.  -Per admitting/pertinent consultants     8. On Pulmonology note question of aspiration PNA, Modified barium swallow test with laryngeal penetration and no aspiration. SPT recommends nectar thick liquids and minced and most consistency solids. Plan for 1 unit PRBC for Hg 7.1. Will follow serial Hg. Diet changes made per above.   Possible EGD if overt bleeding otherwise needs respiratory status improved as high risk for resp failure at this point. Cardiology note reviewed.       DO Presley  2/27/2022  10:05 AM

## 2022-02-27 NOTE — PROGRESS NOTES
ABG drawn x 1 from Right Radial. Patient had Normal Leandro's Test.  Patient was on 30% O2 via Bipap 15/5  at time of puncture. Pressure held for 5. No bleeding or bruising noted at puncture site. Patient tolerated procedure well.       Performed by Monica Ruiz RCP

## 2022-02-27 NOTE — CONSULTS
[facility] Roberto A. Cheron Driver, MD Giovanna Standing, MD Thornton Days, MD Ruddy Koyanagi, MD Coleen Munch, RAHAT Vazquez, ZAIRE  Consultation  Patient's Name: Selvin Paez  11:50 AM  2/27/2022    Nephrologist: Elizabeth Andrews MD    Reason for Consult: Status post kidney transplant known to group  Requesting Physician:  Dipak Davila MD    Chief Complaint: Shortness of breath    History Obtained From: Patient, chart    History of Present Ilness: This is 73years old  lady well-known to our group with multimedical problems presented to the hospital with shortness of breath excessively with acute respiratory failure with hypoxia, patient has mild medical problems including history of CKD status post kidney transplant in 2015 she has acute on chronic combined systolic and diastolic congestive heart failure, history of gout, history of bundle branch block, history of depression, hyperlipidemia, hypertension, hypothyroidism, history of lymphedema both lower extremities,  Patient is Covid negative and currently she is placed on BiPAP for oxygenation.     Past Medical History:   Diagnosis Date    Abnormal EKG     left bundle branch block    Acute gout involving toe of right foot 9/3/2020    Acute gout involving toe of right foot 9/3/2020    Acute on chronic combined systolic (congestive) and diastolic (congestive) heart failure (HCC)     Cancer (HCC)     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    Cerebrovascular disease     Clotting disorder ([address] 1985    thrombophlebitis after c section delivery    Depression     15 years followed by dr Vincent Barnes Hyperlipidemia     Hypertension     Hyperthyroidism     Leg swelling 9/3/2020    Lymphedema of both lower extremities 9/3/2020    Peripheral vascular disease ([address]     Renal failure 11/2012    renal transplant    Thrombophlebitis     after c section delivery    Thyroid disease        Past 17 g, Daily PRN  acetaminophen (TYLENOL) tablet 650 mg, Q6H PRN   Or  acetaminophen (TYLENOL) suppository 650 mg, Q6H PRN  ipratropium-albuterol (DUONEB) nebulizer solution 1 ampule, 4x daily  allopurinol (ZYLOPRIM) tablet 100 mg, Daily  budesonide (PULMICORT) nebulizer suspension 500 mcg, BID  cloZAPine (CLOZARIL) tablet 50 mg, Nightly  cycloSPORINE (SANDIMMUNE) capsule 100 mg, BID  desvenlafaxine succinate (PRISTIQ) extended release tablet 50 mg, QPM  docusate sodium (COLACE) capsule 244 mg, BID  folic acid (FOLVITE) tablet 1 mg, Daily  levothyroxine (SYNTHROID) tablet 50 mcg, Daily  predniSONE (DELTASONE) tablet 5 mg, Daily  mycophenolate (CELLCEPT) capsule 1,000 mg, BID  piperacillin-tazobactam (ZOSYN) 3,375 mg in dextrose 5 % 50 mL IVPB extended infusion (mini-bag), Q8H  0.9 % sodium chloride bolus, Q8H        Review of Systems:       Physical exam:   Vital signs stable afebrile  GEN appearance, patient somnolent, hardly reactive to my verbal stimulation  Head and ENT; normocephalic/atraumatic/supple neck/no JVD  Lungs; anterior rhonchi  Heart; regular rate and rhythm  Abdomen; obese no tenderness  Extremities bilateral swelling both lower extremities  Neurologic; somnolent    Data:   Labs:  CBC with Differential:    Lab Results   Component Value Date    WBC 5.6 02/27/2022    RBC 2.22 02/27/2022    HGB 7.4 02/27/2022    HCT 25.5 02/27/2022     02/27/2022    .9 02/27/2022    MCH 32.0 02/27/2022    MCHC 27.8 02/27/2022    RDW 17.2 02/27/2022    NRBC 1.0 01/13/2022    METASPCT 1.0 01/19/2022    LYMPHOPCT 15.0 02/27/2022    MONOPCT 12.0 02/27/2022    MYELOPCT 1.0 01/19/2022    BASOPCT 0.0 02/27/2022    MONOSABS 0.67 02/27/2022    LYMPHSABS 0.84 02/27/2022    EOSABS 0.00 02/27/2022    BASOSABS 0.00 02/27/2022     CMP:    Lab Results   Component Value Date     02/27/2022    K 4.0 02/27/2022    K 4.7 02/26/2022     02/27/2022    CO2 21 02/27/2022    BUN 31 02/27/2022    CREATININE 1.7 02/27/2022    GFRAA 36 02/27/2022    LABGLOM 30 02/27/2022    GLUCOSE 100 02/27/2022    PROT 5.0 02/27/2022    LABALBU 2.2 02/27/2022    CALCIUM 9.1 02/27/2022    BILITOT 0.7 02/27/2022    ALKPHOS 72 02/27/2022    AST 11 02/27/2022    ALT 6 02/27/2022     Ionized Calcium:  No results found for: IONCA  Magnesium:    Lab Results   Component Value Date    MG 1.5 02/27/2022     Phosphorus:    Lab Results   Component Value Date    PHOS 3.7 02/27/2022     U/A:    Lab Results   Component Value Date    COLORU Yellow 02/26/2022    PHUR 5.0 02/26/2022    WBCUA 2-5 02/26/2022    RBCUA 5-10 02/26/2022    RBCUA 10-20 03/12/2014    YEAST Present 04/09/2021    BACTERIA FEW 02/26/2022    CLARITYU Clear 02/26/2022    SPECGRAV 1.015 02/26/2022    LEUKOCYTESUR TRACE 02/26/2022    UROBILINOGEN 0.2 02/26/2022    BILIRUBINUR Negative 02/26/2022    BLOODU LARGE 02/26/2022    GLUCOSEU Negative 02/26/2022     Microalbumen/Creatinine ratio:  No components found for: RUCREAT  Iron Saturation:  No components found for: PERCENTFE  TIBC:    Lab Results   Component Value Date    TIBC 135 01/19/2022     FERRITIN:    Lab Results   Component Value Date    FERRITIN 4,062 01/23/2022        Imaging:  IR GUIDED IVC FILTER PLACEMENT   Final Result   Successful vena cava filter placement. PLAN:   Retrieval intent (MIPS): The filter is potentially retrievable. Plan/Timing For Retrieval:      Ordering Physician/Contact For Retrieval Plan: [facility]         IR ULTRASOUND GUIDANCE VASCULAR ACCESS   Final Result   Successful vena cava filter placement. PLAN:   Retrieval intent (MIPS): The filter is potentially retrievable. Plan/Timing For Retrieval:      Ordering Physician/Contact For Retrieval Plan: [facility]         FL MODIFIED BARIUM SWALLOW W VIDEO   Final Result   Strands it laryngeal penetration with thin liquid barium. No evidence of   aspiration.       Please see separate speech pathology report for full discussion of findings   and recommendations. RECOMMENDATIONS:   Unavailable         US DUP LOWER EXTREMITIES BILATERAL VENOUS   Final Result   1. INCOMPLETELY OCCLUSIVE THROMBI in the mid and distal LEFT femoral vein and   within the popliteal vein. They have developed in the interim since the   previous study from 11/17/2021.   2. No evidence of DVT in the right lower extremity. RECOMMENDATIONS:   Unavailable         NM LUNG VENT/PERFUSION (VQ)   Final Result   Negative for pulmonary embolus. XR CHEST 1 VIEW   Final Result   1. Mild cardiomegaly. There is no evidence of failure or pneumonia. CT HEAD WO CONTRAST   Final Result   No acute intracranial abnormality. MRI would be useful if symptoms persist.      Inflammatory changes of left greater than right mastoid air cells and left   sphenoid sinus similar to prior MRI. RECOMMENDATIONS:   Unavailable         CTA PULMONARY W CONTRAST   Final Result   Allowing for patient motion artifact, no identified pulmonary embolism. Significant improvement in aeration of the lungs with some residual areas of   presumed atelectasis in the right greater than left base. Cardiomegaly. RECOMMENDATIONS:   Unavailable         CT ABDOMEN PELVIS W IV CONTRAST Additional Contrast? None   Final Result   Thickening of the proximal ascending colon. Given the short-term change,   this is favored to represent a localized area of colitis likely infectious or   inflammatory. Neoplasia thought less likely but follow-up to resolution   would be recommended. Moderate stool in the distal colon. Minimal distention of several proximal small bowel loops likely incidental or   due to mild enteritis. Small hiatal hernia. Other chronic appearing findings. RECOMMENDATIONS:   Unavailable         US DUP UPPER EXTREMITY LEFT VENOUS    (Results Pending)       Assessment  1.   Respiratory failure of unknown etiology, could be secondary to

## 2022-02-27 NOTE — PROGRESS NOTES
INPATIENT CARDIOLOGY Follow UP    Name: Lenin Lance    Age: 73 y.o.     Date of Admission: 2/25/2022  1:08 AM    Date of Service: 2/27/2022      Referring Physician: Ronni Stone MD      Subjective: Sleeping comfortably and not in acute distress            Past Medical History:  Past Medical History:   Diagnosis Date    Abnormal EKG     left bundle branch block    Acute gout involving toe of right foot 9/3/2020    Acute gout involving toe of right foot 9/3/2020    Acute on chronic combined systolic (congestive) and diastolic (congestive) heart failure (HCC)     Cancer ([address]     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    Cerebrovascular disease     Clotting disorder ([address] 1985    thrombophlebitis after c section delivery    Depression     15 years followed by dr Teresa Strong Hyperlipidemia     Hypertension     Hyperthyroidism     Leg swelling 9/3/2020    Lymphedema of both lower extremities 9/3/2020    Peripheral vascular disease ([address]     Renal failure 11/2012    renal transplant    Thrombophlebitis     after c section delivery    Thyroid disease        Past Surgical History:  Past Surgical History:   Procedure Laterality Date   [address]    one normal delivery    COLECTOMY N/A 1/15/2022    DIAGNOSTIC  LAPAROSCOPY LYSIS OF ADHESIONS performed by Shahid Lowe MD at [address] CATH LAB PROCEDURE  2006    normal coronaries and 1996 normal coronaries    DIALYSIS FISTULA CREATION  march and july 2012    phase one and two patient will start dialysis when needed   [address]    transfemoral venous bypass grafts    IR IVC FILTER PLACEMENT W IMAGING  2/26/2022    IR IVC FILTER PLACEMENT W IMAGING 2/26/2022 [id number] SPECIAL PROCEDURES    KIDNEY TRANSPLANT  2016    KIDNEY TRANSPLANT  2015    KIDNEY TRANSPLANT  2015    PARATHYROIDECTOMY  2006    left parathyroid  implant and parathyroidectomy       Family History:  Family History   Problem Relation Age of Onset    Diabetes Mother     Diabetes Father     Dementia Father        Social History:  Social History     Socioeconomic History    Marital status:      Spouse name: Not on file    Number of children: Not on file    Years of education: Not on file    Highest education level: Not on file   Occupational History    Not on file   Tobacco Use    Smoking status: Former Smoker     Packs/day: 1.00     Years: 20.00     Pack years: 20.00     Quit date: 10/1/2015     Years since quittin.4    Smokeless tobacco: Never Used   Vaping Use    Vaping Use: Never used   Substance and Sexual Activity    Alcohol use: No     Comment: 2 cups coffee per day    Drug use: No    Sexual activity: Not on file   Other Topics Concern    Not on file   Social History Narrative    Not on file     Social Determinants of Health     Financial Resource Strain:     Difficulty of Paying Living Expenses: Not on file   Food Insecurity:     Worried About [employer] [employer] in the Last Year: Not on file    [place]ann[place] of Food in the Last Year: Not on file   Transportation Needs:     Lack of Transportation (Medical): Not on file    Lack of Transportation (Non-Medical):  Not on file   Physical Activity:     Days of Exercise per Week: Not on file    Minutes of Exercise per Session: Not on file   Stress:     Feeling of Stress : Not on file   Social Connections:     Frequency of Communication with Friends and Family: Not on file    Frequency of Social Gatherings with Friends and Family: Not on file    Attends Mandaeism Services: Not on file    Active Member of Clubs or Organizations: Not on file    Attends Club or Organization Meetings: Not on file    Marital Status: Not on file   Intimate Partner Violence:     Fear of Current or Ex-Partner: Not on file    Emotionally Abused: Not on file    Physically Abused: Not on file    Sexually Abused: Not on file   Housing Stability:  Unable to Pay for Housing in the Last Year: Not on file    Number of Places Lived in the Last Year: Not on file    Unstable Housing in the Last Year: Not on file       Allergies: Allergies   Allergen Reactions    Macrodantin [Nitrofurantoin Macrocrystal]     Sulfa Antibiotics        Home Medications:  Prior to Admission medications    Medication Sig Start Date End Date Taking?  Authorizing Provider   apixaban (ELIQUIS) 5 MG TABS tablet Take 5 mg by mouth 2 times daily Take in the morning and at bedtime for 3 months (started 2/19/2022)   Yes Historical Provider, MD   atorvastatin (LIPITOR) 10 MG tablet Take 10 mg by mouth daily   Yes Historical Provider, MD   furosemide (LASIX) 20 MG tablet  12/22/21  Yes Historical Provider, MD   lisinopril (PRINIVIL;ZESTRIL) 5 MG tablet take 1 tablet by mouth once daily 12/8/21  Yes Historical Provider, MD   pregabalin (LYRICA) 75 MG capsule  12/26/21  Yes Historical Provider, MD   allopurinol (ZYLOPRIM) 100 MG tablet Take 100 mg by mouth daily   Yes Historical Provider, MD   metoprolol succinate (TOPROL XL) 50 MG extended release tablet take 1 tablet by mouth once daily 4/17/20  Yes Erna Chance MD   desvenlafaxine succinate (PRISTIQ) 50 MG TB24 extended release tablet Take 1 tablet by mouth every evening 12/20/19  Yes Ana Reyna MD   cloZAPine (CLOZARIL) 50 MG tablet Take 50 mg by mouth nightly   Yes Historical Provider, MD   cycloSPORINE (SANDIMMUNE) 100 MG capsule Take 100 mg by mouth 2 times daily   Yes Historical Provider, MD   predniSONE (DELTASONE) 5 MG tablet Take 5 mg by mouth daily   Yes Historical Provider, MD   folic acid (FOLVITE) 1 MG tablet Take 1 mg by mouth daily   Yes Historical Provider, MD   docusate sodium (COLACE) 100 MG capsule Take 100 mg by mouth 2 times daily   Yes Historical Provider, MD   mycophenolate (CELLCEPT) 500 MG tablet Take 1,000 mg by mouth 2 times daily   Yes Historical Provider, MD   levothyroxine (SYNTHROID) 50 MCG tablet Take 50 mcg by mouth daily. Yes Historical Provider, MD   nystatin (MYCOSTATIN) 457381 UNIT/GM powder Apply 3 times daily. 12/27/21   Langston Boast, APRN - CNP   ipratropium-albuterol (DUONEB) 0.5-2.5 (3) MG/3ML SOLN nebulizer solution Inhale 3 mLs into the lungs every 6 hours as needed for Shortness of Breath 11/27/21   Susannah Ramirez MD   budesonide (PULMICORT) 0.5 MG/2ML nebulizer suspension Take 2 mLs by nebulization 2 times daily for 10 days 11/27/21 12/7/21  Selma Sinclair MD   ondansetron (ZOFRAN-ODT) 4 MG disintegrating tablet Take 1 tablet by mouth every 8 hours as needed for Nausea or Vomiting 11/27/21   Pepe Ramirez MD   Cyanocobalamin (B-12 COMPLIANCE INJECTION) 1000 MCG/ML KIT Inject as directed monthly    Historical Provider, MD   simvastatin (ZOCOR) 20 MG tablet Take 20 mg by mouth nightly.       Historical Provider, MD       Current Medications:  Current Facility-Administered Medications   Medication Dose Route Frequency Provider Last Rate Last Admin    0.9 % sodium chloride infusion   IntraVENous PRN Katiuska Rishi APRN - CNP        vancomycin (VANCOCIN) 1,500 mg in dextrose 5 % 300 mL IVPB  1,500 mg IntraVENous Once Kyung Severs, APRN -  mL/hr at 02/27/22 1725 1,500 mg at 02/27/22 1725    lidocaine 1 % injection 5 mL  5 mL IntraDERmal Once Katiuska Rishi APRN - CNP        sodium chloride flush 0.9 % injection 5-40 mL  5-40 mL IntraVENous 2 times per day Katiuska Kugel, APRN - CNP   10 mL at 02/27/22 0841    sodium chloride flush 0.9 % injection 5-40 mL  5-40 mL IntraVENous PRN Katiuska Kugel, APRN - CNP        0.9 % sodium chloride infusion  25 mL IntraVENous PRN Katiuska Kugel, APRN - CNP        heparin flush 100 UNIT/ML injection 100 Units  1 mL IntraVENous 2 times per day Katiuska Kugel, APRN - CNP        heparin flush 100 UNIT/ML injection 100 Units  1 mL IntraCATHeter PRN Katiuska Kugel, APRN - CNP        pantoprazole (PROTONIX) injection 40 mg  40 mg IntraVENous BID Charlette Billy, DO   40 mg at 02/27/22 0840    0.9 % sodium chloride infusion   IntraVENous Continuous Janel Retana, APRN -  mL/hr at 02/27/22 0026 New Bag at 02/27/22 0026    sodium chloride flush 0.9 % injection 5-40 mL  5-40 mL IntraVENous 2 times per day Bryn Richards MD   10 mL at 02/26/22 2143    sodium chloride flush 0.9 % injection 5-40 mL  5-40 mL IntraVENous PRN Pacnho Ramirez MD        0.9 % sodium chloride infusion  25 mL IntraVENous PRN Bryn Richards MD        ondansetron (ZOFRAN-ODT) disintegrating tablet 4 mg  4 mg Oral Q8H PRN Pancho Ramirez MD        Or    ondansetron [facility]) injection 4 mg  4 mg IntraVENous Q6H PRN Pancho Ramirez MD        polyethylene glycol (GLYCOLAX) packet 17 g  17 g Oral Daily PRN Bryn Richards MD        acetaminophen (TYLENOL) tablet 650 mg  650 mg Oral Q6H PRN Bryn Richards MD   650 mg at 02/27/22 7662    Or    acetaminophen (TYLENOL) suppository 650 mg  650 mg Rectal Q6H PRN Bryn Richards MD        ipratropium-albuterol (DUONEB) nebulizer solution 1 ampule  1 ampule Inhalation 4x daily Bryn Richards MD   1 ampule at 02/27/22 1619    allopurinol (ZYLOPRIM) tablet 100 mg  100 mg Oral Daily Susannah Ramirez MD   100 mg at 02/27/22 0841    budesonide (PULMICORT) nebulizer suspension 500 mcg  500 mcg Nebulization BID Bryn Richards MD   500 mcg at 02/27/22 1619    cloZAPine (CLOZARIL) tablet 50 mg  50 mg Oral Nightly Susannah Ramirez MD   50 mg at 02/26/22 2143    cycloSPORINE (SANDIMMUNE) capsule 100 mg  100 mg Oral BID Pancho Ramirez MD   100 mg at 02/27/22 5714    desvenlafaxine succinate (PRISTIQ) extended release tablet 50 mg  50 mg Oral QPM Susannah Ramirez MD   50 mg at 02/27/22 1802    docusate sodium (COLACE) capsule 100 mg  100 mg Oral BID Pancho Ramirez MD   100 mg at 67/37/29 4119    folic acid (FOLVITE) tablet 1 mg  1 mg Oral Daily Bryn Richards MD   1 mg at 02/27/22 0841    levothyroxine (SYNTHROID) tablet 50 mcg  50 mcg Oral Daily Chase Jones MD   50 mcg at 02/27/22 7853    predniSONE (DELTASONE) tablet 5 mg  5 mg Oral Daily Chase Jones MD   5 mg at 02/27/22 0841    mycophenolate (CELLCEPT) capsule 1,000 mg  1,000 mg Oral BID Chase Jones MD   1,000 mg at 02/27/22 0841    piperacillin-tazobactam (ZOSYN) 3,375 mg in dextrose 5 % 50 mL IVPB extended infusion (mini-bag)  3,375 mg IntraVENous Q8H Brittney Gresham MD 12.5 mL/hr at 02/27/22 1513 3,375 mg at 02/27/22 1513    0.9 % sodium chloride bolus  25 mL IntraVENous Q8H Nuzhat Wong MD   Stopped at 02/27/22 1101      sodium chloride      sodium chloride      sodium chloride 100 mL/hr at 02/27/22 0026    sodium chloride         Physical Exam:  BP (!) 117/58   Pulse 79   Temp 97.1 °F (36.2 °C) (Infrared)   Resp 15   Ht 5' 2\" (1.575 m)   Wt 220 lb (99.8 kg)   SpO2 97%   BMI 40.24 kg/m²   Wt Readings from Last 3 Encounters:   02/25/22 220 lb (99.8 kg)   01/19/22 216 lb (98 kg)   11/17/21 227 lb 3.2 oz (103.1 kg)       Appearance: Alert and oriented x3 not in acute distress.   Skin: Dry skin  Head: Atraumatic  Eyes: Intact extraocular muscles   ENMT: Mucous membranes are moist  Neck: Supple  Lungs: Clear to auscultation  Cardiac: Normal S1 and S2  Abdomen: Protuberant  Extremities: Intact range of motion  Neurologic: No focal neurological deficits  Peripheral Pulses: 2+ peripheral pulses    Intake/Output:    Intake/Output Summary (Last 24 hours) at 2/27/2022 1836  Last data filed at 2/27/2022 1457  Gross per 24 hour   Intake 2391.33 ml   Output 625 ml   Net 1766.33 ml     I/O this shift:  In: 348.3 [Blood:348.3]  Out: 175 [Urine:175]    Laboratory Tests:  Recent Labs     02/25/22  0122 02/26/22  0454 02/27/22  0344    140 139   K 4.2 4.7 4.0   CL 98 103 106   CO2 33* 27 21*   BUN 29* 30* 31*   CREATININE 1.7* 1.6* 1.7*   GLUCOSE 113* 106* 100*   CALCIUM 10.6* 9.5 9.1     Lab Results   Component Value Date    MG 1.5 02/27/2022    MG 2.1 01/24/2022    MG 1.9 01/23/2022     Recent Labs     02/25/22 0122 02/26/22 0454 02/27/22 0344   ALKPHOS 105* 74 72   ALT 10 9 6   AST 16 29 11   PROT 6.7 5.2* 5.0*   BILITOT 1.2 0.7 0.7   LABALBU 3.5 2.6* 2.2*     Recent Labs     02/25/22 1948 02/25/22 1948 02/26/22 0454 02/26/22 0454 02/27/22 0344 02/27/22  0953 02/27/22  1602   WBC 6.4  --  6.4  --  5.6  --   --    RBC 2.43*  --  2.45*  --  2.22*  --   --    HGB 7.8*   < > 7.9*   < > 7.1* 7.4* 8.4*   HCT 27.2*   < > 27.6*  --  25.5*  --  28.6*   .9*  --  112.7*  --  114.9*  --   --    MCH 32.1  --  32.2  --  32.0  --   --    MCHC 28.7*  --  28.6*  --  27.8*  --   --    RDW 17.2*  --  17.1*  --  17.2*  --   --      --  136  --  132  --   --    MPV 12.4*  --  12.8*  --  13.0*  --   --     < > = values in this interval not displayed. Lab Results   Component Value Date    CKTOTAL 21 02/25/2022     Recent Labs     02/25/22 0122 02/25/22  0245   CKTOTAL 21  --    TROPHS 191* 176*     Lab Results   Component Value Date    INR 1.1 01/23/2022    INR 1.0 11/20/2021    INR 1.1 11/17/2021    PROTIME 13.0 (H) 01/23/2022    PROTIME 11.5 11/20/2021    PROTIME 12.9 (H) 11/17/2021     Lab Results   Component Value Date    TSH 0.376 11/17/2021    TSH 0.807 12/22/2019     Lab Results   Component Value Date    LABA1C 5.7 (H) 11/17/2021     No results found for: EAG  No results found for: CHOL  No results found for: TRIG  No results found for: HDL  No results found for: LDLCALC, LDLCHOLESTEROL  No results found for: LABVLDL, VLDL  No results found for: CHOLHDLRATIO  Recent Labs     02/25/22  0122   PROBNP 1,013*       Cardiac Tests:  EKG reviewed (EKG date: EKG shows sinus rhythm, left bundle branch block, rate of 98 beats per):        Echocardiogram reviewed: January 2022  Mild concentric left ventricular hypertrophy. There is doppler evidence of stage I diastolic dysfunction.    Ejection fraction is visually estimated at 60 to 65%.   Normal right ventricular size and function. Mild tricuspid regurgitation. Signature    Stress test reviewed:      Cardiac catheterization reviewed:     CXR reviewed: The ASCVD Risk score (Hanny Ferguson., et al., 2013) failed to calculate for the following reasons:    Cannot find a previous HDL lab    Cannot find a previous total cholesterol lab    ASSESSMENT / PLAN:    1. Acute respiratory failure with hypoxia (HCC)  Management per pulmonary and critical care  2. Colitis and anemia now being evaluated for EGD and cardiology called for preoperative cardiovascular evaluation  From cardiovascular perspective patient may proceed to the plan GI procedure without additional cardiac testing  Please check with pulmonology prior to the procedure given patient's pulmonary status  Call cardiology if there are concerns prior to, during or after the procedure  Cardiology will monitor closely and follow patient    3. Dehydration  Management per primary service  4. Elevated troponin  Likely demand ischemia in the setting of anemia  Proceed with GI evaluation  Rest of cardiac evaluation will be discussed when patient is stable and discharged in the outpatient    5. Paroxysmal atrial fibrillation  Not on anticoagulation due to bleeding/anemia  Resume rate control therapy when blood pressure is stable     6. DVT  Has been evaluated for IVC filter in the setting of anemia and GI bleed and not able to tolerate anticoagulation    7. COPD    8. Sleep apnea    9. Chronic kidney disease  Monitor BUN/creatinine closely and avoid nephrotoxic agents                Thank you for allowing me to participate in your patient's care. Please feel free to contact me if you have any questions or concerns.     Aimee Bonilla MD  [facility]) Cardiology

## 2022-02-28 ENCOUNTER — ANESTHESIA EVENT (OUTPATIENT)
Dept: ENDOSCOPY | Age: 67
DRG: 177 | End: 2022-02-28
Payer: MEDICARE

## 2022-02-28 ENCOUNTER — ANESTHESIA (OUTPATIENT)
Dept: ENDOSCOPY | Age: 67
DRG: 177 | End: 2022-02-28
Payer: MEDICARE

## 2022-02-28 VITALS — DIASTOLIC BLOOD PRESSURE: 42 MMHG | OXYGEN SATURATION: 93 % | SYSTOLIC BLOOD PRESSURE: 80 MMHG

## 2022-02-28 LAB
ALBUMIN SERPL-MCNC: 2.5 G/DL (ref 3.5–5.2)
ALP BLD-CCNC: 75 U/L (ref 35–104)
ALT SERPL-CCNC: 8 U/L (ref 0–32)
ANION GAP SERPL CALCULATED.3IONS-SCNC: 9 MMOL/L (ref 7–16)
AST SERPL-CCNC: 12 U/L (ref 0–31)
BASOPHILS ABSOLUTE: 0.05 E9/L (ref 0–0.2)
BASOPHILS RELATIVE PERCENT: 0.9 % (ref 0–2)
BILIRUB SERPL-MCNC: 0.6 MG/DL (ref 0–1.2)
BUN BLDV-MCNC: 25 MG/DL (ref 6–23)
CALCIUM SERPL-MCNC: 9.6 MG/DL (ref 8.6–10.2)
CHLORIDE BLD-SCNC: 106 MMOL/L (ref 98–107)
CO2: 23 MMOL/L (ref 22–29)
CREAT SERPL-MCNC: 1.6 MG/DL (ref 0.5–1)
CULTURE, STOOL: NORMAL
EOSINOPHILS ABSOLUTE: 0.05 E9/L (ref 0.05–0.5)
EOSINOPHILS RELATIVE PERCENT: 0.9 % (ref 0–6)
GFR AFRICAN AMERICAN: 39
GFR NON-AFRICAN AMERICAN: 32 ML/MIN/1.73
GLUCOSE BLD-MCNC: 81 MG/DL (ref 74–99)
HCT VFR BLD CALC: 30.8 % (ref 34–48)
HEMOGLOBIN: 9 G/DL (ref 11.5–15.5)
HEMOGLOBIN: 9 G/DL (ref 11.5–15.5)
HEMOGLOBIN: 9.1 G/DL (ref 11.5–15.5)
LV EF: 63 %
LVEF MODALITY: NORMAL
LYMPHOCYTES ABSOLUTE: 0.94 E9/L (ref 1.5–4)
LYMPHOCYTES RELATIVE PERCENT: 16.7 % (ref 20–42)
MAGNESIUM: 2.1 MG/DL (ref 1.6–2.6)
MCH RBC QN AUTO: 32 PG (ref 26–35)
MCHC RBC AUTO-ENTMCNC: 29.2 % (ref 32–34.5)
MCV RBC AUTO: 109.6 FL (ref 80–99.9)
METAMYELOCYTES RELATIVE PERCENT: 1.8 % (ref 0–1)
MONOCYTES ABSOLUTE: 0.22 E9/L (ref 0.1–0.95)
MONOCYTES RELATIVE PERCENT: 4.4 % (ref 2–12)
MYELOCYTE PERCENT: 0.9 % (ref 0–0)
NEUTROPHILS ABSOLUTE: 4.24 E9/L (ref 1.8–7.3)
NEUTROPHILS RELATIVE PERCENT: 74.6 % (ref 43–80)
PDW BLD-RTO: 17 FL (ref 11.5–15)
PHOSPHORUS: 3.5 MG/DL (ref 2.5–4.5)
PLATELET # BLD: 127 E9/L (ref 130–450)
PMV BLD AUTO: 13.3 FL (ref 7–12)
POTASSIUM SERPL-SCNC: 4.3 MMOL/L (ref 3.5–5)
RBC # BLD: 2.81 E12/L (ref 3.5–5.5)
SODIUM BLD-SCNC: 138 MMOL/L (ref 132–146)
TOTAL PROTEIN: 5.2 G/DL (ref 6.4–8.3)
VANCOMYCIN RANDOM: 17.7 MCG/ML (ref 5–40)
VANCOMYCIN RANDOM: 17.9 MCG/ML (ref 5–40)
WBC # BLD: 5.5 E9/L (ref 4.5–11.5)

## 2022-02-28 PROCEDURE — 94660 CPAP INITIATION&MGMT: CPT

## 2022-02-28 PROCEDURE — 2580000003 HC RX 258: Performed by: INTERNAL MEDICINE

## 2022-02-28 PROCEDURE — 2580000003 HC RX 258: Performed by: NURSE PRACTITIONER

## 2022-02-28 PROCEDURE — 99221 1ST HOSP IP/OBS SF/LOW 40: CPT | Performed by: SURGERY

## 2022-02-28 PROCEDURE — 99232 SBSQ HOSP IP/OBS MODERATE 35: CPT | Performed by: INTERNAL MEDICINE

## 2022-02-28 PROCEDURE — 94640 AIRWAY INHALATION TREATMENT: CPT

## 2022-02-28 PROCEDURE — 3700000000 HC ANESTHESIA ATTENDED CARE: Performed by: INTERNAL MEDICINE

## 2022-02-28 PROCEDURE — 6360000002 HC RX W HCPCS: Performed by: INTERNAL MEDICINE

## 2022-02-28 PROCEDURE — 7100000011 HC PHASE II RECOVERY - ADDTL 15 MIN: Performed by: INTERNAL MEDICINE

## 2022-02-28 PROCEDURE — 93325 DOPPLER ECHO COLOR FLOW MAPG: CPT

## 2022-02-28 PROCEDURE — 2580000003 HC RX 258: Performed by: SPECIALIST

## 2022-02-28 PROCEDURE — 36415 COLL VENOUS BLD VENIPUNCTURE: CPT

## 2022-02-28 PROCEDURE — 85025 COMPLETE CBC W/AUTO DIFF WBC: CPT

## 2022-02-28 PROCEDURE — C9113 INJ PANTOPRAZOLE SODIUM, VIA: HCPCS | Performed by: INTERNAL MEDICINE

## 2022-02-28 PROCEDURE — 2060000000 HC ICU INTERMEDIATE R&B

## 2022-02-28 PROCEDURE — 2500000003 HC RX 250 WO HCPCS: Performed by: INTERNAL MEDICINE

## 2022-02-28 PROCEDURE — 6360000002 HC RX W HCPCS: Performed by: SPECIALIST

## 2022-02-28 PROCEDURE — APPSS30 APP SPLIT SHARED TIME 16-30 MINUTES: Performed by: NURSE PRACTITIONER

## 2022-02-28 PROCEDURE — B24BZZ4 ULTRASONOGRAPHY OF HEART WITH AORTA, TRANSESOPHAGEAL: ICD-10-PCS | Performed by: INTERNAL MEDICINE

## 2022-02-28 PROCEDURE — 3700000001 HC ADD 15 MINUTES (ANESTHESIA): Performed by: INTERNAL MEDICINE

## 2022-02-28 PROCEDURE — 6360000002 HC RX W HCPCS

## 2022-02-28 PROCEDURE — 93312 ECHO TRANSESOPHAGEAL: CPT | Performed by: INTERNAL MEDICINE

## 2022-02-28 PROCEDURE — 80202 ASSAY OF VANCOMYCIN: CPT

## 2022-02-28 PROCEDURE — 97110 THERAPEUTIC EXERCISES: CPT

## 2022-02-28 PROCEDURE — 93312 ECHO TRANSESOPHAGEAL: CPT

## 2022-02-28 PROCEDURE — 84100 ASSAY OF PHOSPHORUS: CPT

## 2022-02-28 PROCEDURE — 2700000000 HC OXYGEN THERAPY PER DAY

## 2022-02-28 PROCEDURE — 6370000000 HC RX 637 (ALT 250 FOR IP): Performed by: INTERNAL MEDICINE

## 2022-02-28 PROCEDURE — 2709999900 HC NON-CHARGEABLE SUPPLY: Performed by: INTERNAL MEDICINE

## 2022-02-28 PROCEDURE — 80053 COMPREHEN METABOLIC PANEL: CPT

## 2022-02-28 PROCEDURE — 85018 HEMOGLOBIN: CPT

## 2022-02-28 PROCEDURE — 83735 ASSAY OF MAGNESIUM: CPT

## 2022-02-28 PROCEDURE — 7100000010 HC PHASE II RECOVERY - FIRST 15 MIN: Performed by: INTERNAL MEDICINE

## 2022-02-28 PROCEDURE — 99233 SBSQ HOSP IP/OBS HIGH 50: CPT | Performed by: INTERNAL MEDICINE

## 2022-02-28 RX ORDER — LIDOCAINE HYDROCHLORIDE 10 MG/ML
5 INJECTION, SOLUTION EPIDURAL; INFILTRATION; INTRACAUDAL; PERINEURAL ONCE
Status: DISCONTINUED | OUTPATIENT
Start: 2022-02-28 | End: 2022-03-02 | Stop reason: HOSPADM

## 2022-02-28 RX ORDER — HEPARIN SODIUM (PORCINE) LOCK FLUSH IV SOLN 100 UNIT/ML 100 UNIT/ML
3 SOLUTION INTRAVENOUS PRN
Status: DISCONTINUED | OUTPATIENT
Start: 2022-02-28 | End: 2022-03-02 | Stop reason: HOSPADM

## 2022-02-28 RX ORDER — PROPOFOL 10 MG/ML
INJECTION, EMULSION INTRAVENOUS PRN
Status: DISCONTINUED | OUTPATIENT
Start: 2022-02-28 | End: 2022-02-28 | Stop reason: SDUPTHER

## 2022-02-28 RX ORDER — SODIUM CHLORIDE 9 MG/ML
25 INJECTION, SOLUTION INTRAVENOUS PRN
Status: DISCONTINUED | OUTPATIENT
Start: 2022-02-28 | End: 2022-03-02 | Stop reason: HOSPADM

## 2022-02-28 RX ORDER — SODIUM CHLORIDE 0.9 % (FLUSH) 0.9 %
5-40 SYRINGE (ML) INJECTION EVERY 12 HOURS SCHEDULED
Status: DISCONTINUED | OUTPATIENT
Start: 2022-02-28 | End: 2022-03-02 | Stop reason: HOSPADM

## 2022-02-28 RX ORDER — SODIUM CHLORIDE 0.9 % (FLUSH) 0.9 %
5-40 SYRINGE (ML) INJECTION PRN
Status: DISCONTINUED | OUTPATIENT
Start: 2022-02-28 | End: 2022-02-28

## 2022-02-28 RX ORDER — HEPARIN SODIUM (PORCINE) LOCK FLUSH IV SOLN 100 UNIT/ML 100 UNIT/ML
1 SOLUTION INTRAVENOUS PRN
Status: DISCONTINUED | OUTPATIENT
Start: 2022-02-28 | End: 2022-03-02 | Stop reason: HOSPADM

## 2022-02-28 RX ORDER — HEPARIN SODIUM (PORCINE) LOCK FLUSH IV SOLN 100 UNIT/ML 100 UNIT/ML
3 SOLUTION INTRAVENOUS EVERY 12 HOURS SCHEDULED
Status: DISCONTINUED | OUTPATIENT
Start: 2022-02-28 | End: 2022-03-02 | Stop reason: HOSPADM

## 2022-02-28 RX ORDER — HEPARIN SODIUM (PORCINE) LOCK FLUSH IV SOLN 100 UNIT/ML 100 UNIT/ML
1 SOLUTION INTRAVENOUS EVERY 12 HOURS SCHEDULED
Status: DISCONTINUED | OUTPATIENT
Start: 2022-02-28 | End: 2022-02-28

## 2022-02-28 RX ADMIN — CLOZAPINE 50 MG: 25 TABLET ORAL at 21:42

## 2022-02-28 RX ADMIN — ALLOPURINOL 100 MG: 100 TABLET ORAL at 13:53

## 2022-02-28 RX ADMIN — CYCLOSPORINE 100 MG: 25 CAPSULE, GELATIN COATED ORAL at 21:43

## 2022-02-28 RX ADMIN — SODIUM CHLORIDE: 9 INJECTION, SOLUTION INTRAVENOUS at 08:33

## 2022-02-28 RX ADMIN — LIDOCAINE HYDROCHLORIDE 5 ML: 10 INJECTION, SOLUTION INFILTRATION; PERINEURAL at 16:54

## 2022-02-28 RX ADMIN — DESVENLAFAXINE SUCCINATE 50 MG: 50 TABLET, FILM COATED, EXTENDED RELEASE ORAL at 23:39

## 2022-02-28 RX ADMIN — PROPOFOL 40 MG: 10 INJECTION, EMULSION INTRAVENOUS at 11:40

## 2022-02-28 RX ADMIN — PROPOFOL 30 MG: 10 INJECTION, EMULSION INTRAVENOUS at 11:37

## 2022-02-28 RX ADMIN — PROPOFOL 50 MG: 10 INJECTION, EMULSION INTRAVENOUS at 11:42

## 2022-02-28 RX ADMIN — MYCOPHENOLATE MOFETIL 1000 MG: 250 CAPSULE ORAL at 13:52

## 2022-02-28 RX ADMIN — IPRATROPIUM BROMIDE AND ALBUTEROL SULFATE 1 AMPULE: .5; 2.5 SOLUTION RESPIRATORY (INHALATION) at 06:26

## 2022-02-28 RX ADMIN — PREDNISONE 5 MG: 5 TABLET ORAL at 13:53

## 2022-02-28 RX ADMIN — PROPOFOL 30 MG: 10 INJECTION, EMULSION INTRAVENOUS at 11:34

## 2022-02-28 RX ADMIN — SODIUM CHLORIDE 25 ML: 9 INJECTION, SOLUTION INTRAVENOUS at 01:45

## 2022-02-28 RX ADMIN — PANTOPRAZOLE SODIUM 40 MG: 40 INJECTION, POWDER, FOR SOLUTION INTRAVENOUS at 21:43

## 2022-02-28 RX ADMIN — LEVOTHYROXINE SODIUM 50 MCG: 0.05 TABLET ORAL at 05:47

## 2022-02-28 RX ADMIN — PANTOPRAZOLE SODIUM 40 MG: 40 INJECTION, POWDER, FOR SOLUTION INTRAVENOUS at 13:51

## 2022-02-28 RX ADMIN — IPRATROPIUM BROMIDE AND ALBUTEROL SULFATE 1 AMPULE: .5; 2.5 SOLUTION RESPIRATORY (INHALATION) at 09:47

## 2022-02-28 RX ADMIN — VANCOMYCIN HYDROCHLORIDE 1000 MG: 1 INJECTION, POWDER, LYOPHILIZED, FOR SOLUTION INTRAVENOUS at 21:58

## 2022-02-28 RX ADMIN — BUDESONIDE 500 MCG: 0.5 INHALANT RESPIRATORY (INHALATION) at 06:26

## 2022-02-28 RX ADMIN — BUDESONIDE 500 MCG: 0.5 INHALANT RESPIRATORY (INHALATION) at 19:17

## 2022-02-28 RX ADMIN — PIPERACILLIN SODIUM AND TAZOBACTAM SODIUM 3375 MG: 3; .375 INJECTION, POWDER, LYOPHILIZED, FOR SOLUTION INTRAVENOUS at 21:54

## 2022-02-28 RX ADMIN — SODIUM CHLORIDE: 9 INJECTION, SOLUTION INTRAVENOUS at 11:28

## 2022-02-28 RX ADMIN — CYCLOSPORINE 100 MG: 25 CAPSULE, GELATIN COATED ORAL at 13:52

## 2022-02-28 RX ADMIN — MYCOPHENOLATE MOFETIL 1000 MG: 250 CAPSULE ORAL at 21:42

## 2022-02-28 RX ADMIN — FOLIC ACID 1 MG: 1 TABLET ORAL at 13:53

## 2022-02-28 RX ADMIN — PROPOFOL 50 MG: 10 INJECTION, EMULSION INTRAVENOUS at 11:32

## 2022-02-28 RX ADMIN — IPRATROPIUM BROMIDE AND ALBUTEROL SULFATE 1 AMPULE: .5; 2.5 SOLUTION RESPIRATORY (INHALATION) at 19:17

## 2022-02-28 RX ADMIN — SODIUM CHLORIDE: 9 INJECTION, SOLUTION INTRAVENOUS at 10:49

## 2022-02-28 RX ADMIN — Medication 10 ML: at 23:26

## 2022-02-28 RX ADMIN — DOCUSATE SODIUM 100 MG: 100 CAPSULE ORAL at 21:43

## 2022-02-28 RX ADMIN — PIPERACILLIN SODIUM AND TAZOBACTAM SODIUM 3375 MG: 3; .375 INJECTION, POWDER, LYOPHILIZED, FOR SOLUTION INTRAVENOUS at 04:28

## 2022-02-28 RX ADMIN — PROPOFOL 50 MG: 10 INJECTION, EMULSION INTRAVENOUS at 11:27

## 2022-02-28 ASSESSMENT — PAIN SCALES - GENERAL
PAINLEVEL_OUTOF10: 0

## 2022-02-28 ASSESSMENT — ENCOUNTER SYMPTOMS
EYES NEGATIVE: 1
COUGH: 1
GASTROINTESTINAL NEGATIVE: 1
SHORTNESS OF BREATH: 1
ALLERGIC/IMMUNOLOGIC NEGATIVE: 1

## 2022-02-28 NOTE — PROGRESS NOTES
02/28/22 0625   NIV Type   NIV Started/Stopped On   Equipment Type V60   Mode Bilevel   Mask Type Full face mask   Mask Size Small   Settings/Measurements   IPAP 15 cmH20   CPAP/EPAP 5 cmH2O   Rate Ordered 15   Resp 17   Insp Rise Time (%) 3 %   FiO2  30 %   I Time/ I Time % 0.9 s   Vt Exhaled 389 mL   Minute Volume 6.8 Liters   Mask Leak (lpm) 15 lpm   Date: 2/28/2022    Time: 6:29 AM    Patient Placed On BIPAP/CPAP/ Non-Invasive Ventilation? Yes    If no must comment. Facial area red/color change? No           If YES are Blister/Lesion present? No   If yes must notify nursing staff  BIPAP/CPAP skin barrier? Yes    Skin barrier type:mepilexlite       Comments:         [place] RCP

## 2022-02-28 NOTE — PROGRESS NOTES
Lab attempted to get blood cultures again but were unsuccessful.  Electronically signed by Digna Meng RN on 2/28/2022 at 6:03 AM

## 2022-02-28 NOTE — PROGRESS NOTES
Physical Therapy    Room #:   ENDO POOL ROOM/NONE    Date: 2022       Patient Name: Ysabel Duffy  : 1955      MRN: [id number]     Patient unavailable for physical therapy treatment due to off floor at [address] [place] [address]

## 2022-02-28 NOTE — ANESTHESIA POSTPROCEDURE EVALUATION
Department of Anesthesiology  Postprocedure Note    Patient: Felicitas Phan  MRN: [id number]  YOB: 1955  Date of evaluation: 2/28/2022  Time:  1:05 PM     Procedure Summary     Date: 02/28/22 Room / Location: [address] [place] [address] 01 / [facility]    Anesthesia Start: 8906 Anesthesia Stop: 5713    Procedure: TRANSESOPHAGEAL ECHOCARDIOGRAM WITH BUBBLE STUDY (N/A ) Diagnosis: (BACTEREMIA)    Surgeons: Angelique Adams MD Responsible Provider: Reji Paredes MD    Anesthesia Type: general ASA Status: 4 - Emergent          Anesthesia Type: general    Carla Phase I:      Carla Phase II: Carla Score: 9    Last vitals: Reviewed and per EMR flowsheets.        Anesthesia Post Evaluation    Patient location during evaluation: PACU  Patient participation: complete - patient participated  Level of consciousness: awake  Pain score: 0  Airway patency: patent  Nausea & Vomiting: no nausea  Complications: no  Cardiovascular status: blood pressure returned to baseline  Respiratory status: acceptable  Hydration status: euvolemic

## 2022-02-28 NOTE — PLAN OF CARE
Patient off the floor during rounds. Will see later today or tomorrow  Please, call with questions/concerns. Thank you.     Cliff Reyes MD

## 2022-02-28 NOTE — CARE COORDINATION
SOCIAL WORK / DISCHARGE PLANNING:  COVID neg 2/25. WILL NEED RAPID COVID PRIOR TO DC. Dc plan to return to [facility] [phone] fax # [phone] skilled. NO PRECERT needed. CLEMENCIA today. 3L/ bipap at night. GINA will need signed by physician.                     Electronically signed by AG Stevens on 2/28/2022 at 12:17 PM

## 2022-02-28 NOTE — PROGRESS NOTES
Pulmonary/Critical Care Progress Note    We are following patient for respiratory failure acute on chronic    SUBJECTIVE:  16-year-old woman with morbid obesity with suspected obstructive sleep apnea presented with acute on chronic hypoxic and hypercapnic respiratory failure as well. Initially concern for an aspiration pneumonia as well. Patient has a history of COPD and was diagnosed as well with left lower leg DVT now has an IVC filter placed 2/26/2022. Past medical history: Positive for renal sufficiency with prior renal transplant with chronic immunosuppression of cyclosporine, mycophenolate and prednisone. History of colitis with pneumatosis in January.   Chronic anemia has yet to undergo EGD and colonoscopy    MEDICATIONS:   sodium chloride flush  5-40 mL IntraVENous 2 times per day    heparin flush  1 mL IntraVENous 2 times per day    lidocaine PF  5 mL IntraDERmal Once    sodium chloride flush  5-40 mL IntraVENous 2 times per day    heparin flush  3 mL IntraVENous 2 times per day    vancomycin  1,000 mg IntraVENous Once    vancomycin (VANCOCIN) intermittent dosing (placeholder)   Other RX Placeholder    sodium chloride flush  5-40 mL IntraVENous 2 times per day    heparin flush  1 mL IntraVENous 2 times per day    pantoprazole  40 mg IntraVENous BID    sodium chloride flush  5-40 mL IntraVENous 2 times per day    ipratropium-albuterol  1 ampule Inhalation 4x daily    allopurinol  100 mg Oral Daily    budesonide  500 mcg Nebulization BID    cloZAPine  50 mg Oral Nightly    cycloSPORINE  100 mg Oral BID    desvenlafaxine succinate  50 mg Oral QPM    docusate sodium  100 mg Oral BID    folic acid  1 mg Oral Daily    levothyroxine  50 mcg Oral Daily    predniSONE  5 mg Oral Daily    mycophenolate  1,000 mg Oral BID    piperacillin-tazobactam  3,375 mg IntraVENous Q8H    sodium chloride  25 mL IntraVENous Q8H      sodium chloride      sodium chloride      sodium chloride      sodium chloride Stopped (02/28/22 1028)    sodium chloride       sodium chloride flush, sodium chloride, heparin flush, sodium chloride flush, sodium chloride, heparin flush, sodium chloride flush, sodium chloride, heparin flush, sodium chloride flush, sodium chloride, ondansetron **OR** ondansetron, polyethylene glycol, acetaminophen **OR** acetaminophen    Review of Systems   Constitutional: Negative. HENT: Negative. Eyes: Negative. Respiratory: Positive for cough and shortness of breath. Cardiovascular: Negative. Gastrointestinal: Negative. Endocrine: Negative. Genitourinary: Negative. Musculoskeletal: Negative. New fistula left arm. Skin: Negative. Allergic/Immunologic: Negative. Neurological: Negative. Hematological: Negative. Psychiatric/Behavioral: Negative.         OBJECTIVE:  Vitals:    02/28/22 1800   BP: 138/68   Pulse: 98   Resp: 20   Temp: 96.3 °F (35.7 °C)   SpO2: 98%     FiO2 : 30 %  O2 Flow Rate (L/min): 2 L/min  O2 Device: Nasal cannula    PHYSICAL EXAM:  Constitutional: Obese woman head ability 30 degrees  Skin: No skin breakdown edema in her extremities bilaterally  HEENT: Normocephalic neck is supple pharynx is clear no sinus tenderness  Neck: No JVD no tracheal ration  Cardiovascular: Rate rhythm regular no gallop no murmur  Respiratory: Clear overall no consolidation  Gastrointestinal: Soft bowel sounds present protuberant  Genitourinary: Deferred  Extremities: Large with peripheral edema  Neurological: Alert interactive  Psychological: Appropriate    LABS:  WBC   Date Value Ref Range Status   02/28/2022 5.5 4.5 - 11.5 E9/L Final   02/27/2022 5.6 4.5 - 11.5 E9/L Final   02/26/2022 6.4 4.5 - 11.5 E9/L Final     Hemoglobin   Date Value Ref Range Status   02/28/2022 9.0 (L) 11.5 - 15.5 g/dL Final   02/28/2022 9.0 (L) 11.5 - 15.5 g/dL Final   02/27/2022 8.7 (L) 11.5 - 15.5 g/dL Final     Hematocrit   Date Value Ref Range Status   02/28/2022 30.8 (L) 34.0 - 48.0 % Final   02/27/2022 28.6 (L) 34.0 - 48.0 % Final   02/27/2022 25.5 (L) 34.0 - 48.0 % Final     MCV   Date Value Ref Range Status   02/28/2022 109.6 (H) 80.0 - 99.9 fL Final   02/27/2022 114.9 (H) 80.0 - 99.9 fL Final   02/26/2022 112.7 (H) 80.0 - 99.9 fL Final     Platelets   Date Value Ref Range Status   02/28/2022 127 (L) 130 - 450 E9/L Final   02/27/2022 132 130 - 450 E9/L Final   02/26/2022 136 130 - 450 E9/L Final     Sodium   Date Value Ref Range Status   02/28/2022 138 132 - 146 mmol/L Final   02/27/2022 139 132 - 146 mmol/L Final   02/26/2022 140 132 - 146 mmol/L Final     Potassium   Date Value Ref Range Status   02/28/2022 4.3 3.5 - 5.0 mmol/L Final   02/27/2022 4.0 3.5 - 5.0 mmol/L Final   01/24/2022 3.9 3.5 - 5.0 mmol/L Final     Potassium reflex Magnesium   Date Value Ref Range Status   02/26/2022 4.7 3.5 - 5.0 mmol/L Final     Comment:     Specimen is moderately Hemolyzed. Result may be artificially increased. 02/25/2022 4.2 3.5 - 5.0 mmol/L Final   01/10/2022 5.5 (H) 3.5 - 5.0 mmol/L Final     Comment:     Specimen is moderately Hemolyzed. Result may be artificially increased.      Chloride   Date Value Ref Range Status   02/28/2022 106 98 - 107 mmol/L Final   02/27/2022 106 98 - 107 mmol/L Final   02/26/2022 103 98 - 107 mmol/L Final     CO2   Date Value Ref Range Status   02/28/2022 23 22 - 29 mmol/L Final   02/27/2022 21 (L) 22 - 29 mmol/L Final   02/26/2022 27 22 - 29 mmol/L Final     BUN   Date Value Ref Range Status   02/28/2022 25 (H) 6 - 23 mg/dL Final   02/27/2022 31 (H) 6 - 23 mg/dL Final   02/26/2022 30 (H) 6 - 23 mg/dL Final     CREATININE   Date Value Ref Range Status   02/28/2022 1.6 (H) 0.5 - 1.0 mg/dL Final   02/27/2022 1.7 (H) 0.5 - 1.0 mg/dL Final   02/26/2022 1.6 (H) 0.5 - 1.0 mg/dL Final     Glucose   Date Value Ref Range Status   02/28/2022 81 74 - 99 mg/dL Final   02/27/2022 100 (H) 74 - 99 mg/dL Final   02/26/2022 106 (H) 74 - 99 mg/dL Final     Calcium   Date Value Ref Range Status   02/28/2022 9.6 8.6 - 10.2 mg/dL Final   02/27/2022 9.1 8.6 - 10.2 mg/dL Final   02/26/2022 9.5 8.6 - 10.2 mg/dL Final     Total Protein   Date Value Ref Range Status   02/28/2022 5.2 (L) 6.4 - 8.3 g/dL Final   02/27/2022 5.0 (L) 6.4 - 8.3 g/dL Final   02/26/2022 5.2 (L) 6.4 - 8.3 g/dL Final     Albumin   Date Value Ref Range Status   02/28/2022 2.5 (L) 3.5 - 5.2 g/dL Final   02/27/2022 2.2 (L) 3.5 - 5.2 g/dL Final   02/26/2022 2.6 (L) 3.5 - 5.2 g/dL Final     Total Bilirubin   Date Value Ref Range Status   02/28/2022 0.6 0.0 - 1.2 mg/dL Final   02/27/2022 0.7 0.0 - 1.2 mg/dL Final   02/26/2022 0.7 0.0 - 1.2 mg/dL Final     Alkaline Phosphatase   Date Value Ref Range Status   02/28/2022 75 35 - 104 U/L Final   02/27/2022 72 35 - 104 U/L Final   02/26/2022 74 35 - 104 U/L Final     AST   Date Value Ref Range Status   02/28/2022 12 0 - 31 U/L Final   02/27/2022 11 0 - 31 U/L Final   02/26/2022 29 0 - 31 U/L Final     Comment:     Specimen is moderately Hemolyzed. Result may be artificially increased. ALT   Date Value Ref Range Status   02/28/2022 8 0 - 32 U/L Final   02/27/2022 6 0 - 32 U/L Final   02/26/2022 9 0 - 32 U/L Final     GFR Non-   Date Value Ref Range Status   02/28/2022 32 >=60 mL/min/1.73 Final     Comment:     Chronic Kidney Disease: less than 60 ml/min/1.73 sq.m. Kidney Failure: less than 15 ml/min/1.73 sq.m. Results valid for patients 18 years and older. 02/27/2022 30 >=60 mL/min/1.73 Final     Comment:     Chronic Kidney Disease: less than 60 ml/min/1.73 sq.m. Kidney Failure: less than 15 ml/min/1.73 sq.m. Results valid for patients 18 years and older. 02/26/2022 32 >=60 mL/min/1.73 Final     Comment:     Chronic Kidney Disease: less than 60 ml/min/1.73 sq.m. Kidney Failure: less than 15 ml/min/1.73 sq.m. Results valid for patients 18 years and older.        GFR    Date Value Ref Range Status   02/28/2022 39  Final   02/27/2022 36  Final   02/26/2022 39  Final     Magnesium   Date Value Ref Range Status   02/28/2022 2.1 1.6 - 2.6 mg/dL Final   02/27/2022 1.5 (L) 1.6 - 2.6 mg/dL Final   01/24/2022 2.1 1.6 - 2.6 mg/dL Final     Phosphorus   Date Value Ref Range Status   02/28/2022 3.5 2.5 - 4.5 mg/dL Final   02/27/2022 3.7 2.5 - 4.5 mg/dL Final   01/24/2022 3.6 2.5 - 4.5 mg/dL Final     Recent Labs     02/27/22  1105   PH 7.320*   PO2 88.5   PCO2 47.6*   HCO3 24.0   BE -2.1   O2SAT 95.8       RADIOLOGY:  US DUP UPPER EXTREMITY LEFT VENOUS   Final Result   No evidence of DVT. IR GUIDED IVC FILTER PLACEMENT   Final Result   Successful vena cava filter placement. PLAN:   Retrieval intent (MIPS): The filter is potentially retrievable. Plan/Timing For Retrieval:      Ordering Physician/Contact For Retrieval Plan: Matteo Aleman         IR ULTRASOUND GUIDANCE VASCULAR ACCESS   Final Result   Successful vena cava filter placement. PLAN:   Retrieval intent (MIPS): The filter is potentially retrievable. Plan/Timing For Retrieval:      Ordering Physician/Contact For Retrieval Plan: Matteo Aleman         FL MODIFIED BARIUM SWALLOW W VIDEO   Final Result   Strands it laryngeal penetration with thin liquid barium. No evidence of   aspiration. Please see separate speech pathology report for full discussion of findings   and recommendations. RECOMMENDATIONS:   Unavailable         US DUP LOWER EXTREMITIES BILATERAL VENOUS   Final Result   1. INCOMPLETELY OCCLUSIVE THROMBI in the mid and distal LEFT femoral vein and   within the popliteal vein. They have developed in the interim since the   previous study from 11/17/2021.   2. No evidence of DVT in the right lower extremity. RECOMMENDATIONS:   Unavailable         NM LUNG VENT/PERFUSION (VQ)   Final Result   Negative for pulmonary embolus. XR CHEST 1 VIEW   Final Result   1. Mild cardiomegaly.   There is no evidence of failure or pneumonia. CT HEAD WO CONTRAST   Final Result   No acute intracranial abnormality. MRI would be useful if symptoms persist.      Inflammatory changes of left greater than right mastoid air cells and left   sphenoid sinus similar to prior MRI. RECOMMENDATIONS:   Unavailable         CTA PULMONARY W CONTRAST   Final Result   Allowing for patient motion artifact, no identified pulmonary embolism. Significant improvement in aeration of the lungs with some residual areas of   presumed atelectasis in the right greater than left base. Cardiomegaly. RECOMMENDATIONS:   Unavailable         CT ABDOMEN PELVIS W IV CONTRAST Additional Contrast? None   Final Result   Thickening of the proximal ascending colon. Given the short-term change,   this is favored to represent a localized area of colitis likely infectious or   inflammatory. Neoplasia thought less likely but follow-up to resolution   would be recommended. Moderate stool in the distal colon. Minimal distention of several proximal small bowel loops likely incidental or   due to mild enteritis. Small hiatal hernia. Other chronic appearing findings. RECOMMENDATIONS:   Unavailable         IR INTERVENTIONAL RADIOLOGY PROCEDURE REQUEST    (Results Pending)           PROBLEM LIST:  Principal Problem:    Acute respiratory failure with hypoxia (HCC)  Active Problems:    Hypoxia    Acute metabolic encephalopathy    Acute deep vein thrombosis (DVT) of femoral vein of left lower extremity (HCC)    Anemia    Positive blood culture  Resolved Problems:    * No resolved hospital problems. *      IMPRESSION:  1. Morbid obesity with suspected obstructive sleep apnea using nocturnal BiPAP  2. Acute on chronic respiratory failure hypoxic and hypercapnic  3. DVT now with Tanya filter  4. Anemia chronic  5. Patient bipolar disorder  6. Morbid obesity  7. End-stage renal disease on dialysis  8.  Immunosuppression post transplant    PLAN:  1. Continue with respiratory measures of treatment  2. PT OT as patient may participate  3. Continue nocturnal BiPAP with nasal cannula oxygen daytime  4. May benefit from skilled time    ATTESTATION:  ICU Staff Physician note of personal involvement in Care  As the attending physician, I certify that I personally reviewed the patients history and personnally examined the patient to confirm the physical findings described above,  And that I reviewed the relevant imaging studies and available reports. I also discussed the differential diagnosis and all of the proposed management plans with the patient and individuals accompanying the patient to this visit. They had the opportunity to ask questions about the proposed management plans and to have those questions answered.      Thien Barry DO pulmonary/CC  Electronically signed by Thien Barry DO on 2/28/2022 at 6:40 PM

## 2022-02-28 NOTE — PROGRESS NOTES
Pharmacy Consultation Note  (Antibiotic Dosing and Monitoring)    Initial consult date: 22  Consulting physician: Dr Mary Dominguez  Drug(s): Vancomycin IV  Indication: Bacteremia    Ht Readings from Last 1 Encounters:   22 5' 2\" (1.575 m)     Wt Readings from Last 1 Encounters:   22 220 lb (99.8 kg)       Age/  Gender Act BW IBW DW  Allergy Information   73 y.o.     female 99.8 kg 50.1 kg 70 kg  Macrodantin [nitrofurantoin macrocrystal] and Sulfa antibiotics                 Date  WBC BUN/CR UOP Drug/Dose Time   Given Level(s)   (Time) Comments     (#1) 5.6 31/1.7 -- Vancomycin 1500 mg IV x 1 1725       (#2) 5.5 25/1.6 0.2 Vancomycin 1000 mg IV x 1 <2200> Random @ 1722 = 17.7 mcg/mL Pharmacy consulted to dose     (#3)            (#4)            (#5)            (#6)            (#7)            Estimated Creatinine Clearance: 39 mL/min (A) (based on SCr of 1.6 mg/dL (H)). UOP over the past 24 hours:       Intake/Output Summary (Last 24 hours) at 2022 1633  Last data filed at 2022 1505  Gross per 24 hour   Intake 1880.72 ml   Output 500 ml   Net 1380.72 ml       Temp max: Temp (24hrs), Av.4 °F (36.3 °C), Min:96 °F (35.6 °C), Max:98.4 °F (36.9 °C)      Antibiotic Regimen:  Antibiotic Dose Date Initiated   Zosyn 3.375 g IV Q8H      Cultures:  available culture and sensitivity results were reviewed in EPIC  Cultures sent and are pending. Culture Date Result    Blood cx #1  CoNS   Blood cx #2  NGTD   Covid-19  Not detected   Respiratory cx  Negative   Rapid Flu  Negative   Stool cx  Negative   Urine cx  NGTD   Blood cx #3       Assessment:  · Consulted by Dr. Mary Dominguez to dose/monitor vancomycin  · Goal trough level:  15-20 mcg/mL  · Pt is a 74 y/o F s/p renal transplant on immunosuppressants.  1/2 initial blood sets yielding CoNS  · Serum creatinine today: 1.6; CrCl ~ 39 mL/min; baseline Scr ~ 1-1.2    Plan:  · Dose by levels due to CECILY  · Vancomycin 1000 mg IV x 1  · Random level tomorrow per renal function  · Follow renal function  · Pharmacist will follow and monitor/adjust dosing as necessary      Thank you for the consult,    Ashutosh Sidhu, PharmD 2/28/2022 6:17 PM   [phone]

## 2022-02-28 NOTE — PROGRESS NOTES
PROGRESS NOTE    By Jodie Negron D.O GI Fellow    [facility]  Dr. Skyla Gonsalez MD, Dr. Riky Avila MD, Dr Adair Lawrence, Dr. Jean Rivera MD, Dr. Ileana Newell, DO Sheri Moritz  73 y.o.  female    Starlin Kussmaul was seen and examine at bedside this morning. Patient is a appreciated breathing on her own this  morning and not be on BiPAP. However patient is refusing any kind of medical care at this time. Of note, patient is requesting to be transferred to [facility]. Patient will advised and educated that it is possible given the current pandemic that the [facility] will be unable to accept her as a transfer. Patient still refusing all medical care at this facility and requested transfer to [facility]. OBJECTIVE: Patient was breathing comfortably without any respiratory distress.     /68   Pulse 82   Temp 98.4 °F (36.9 °C) (Infrared)   Resp 18   Ht 5' 2\" (1.575 m)   Wt 220 lb (99.8 kg)   SpO2 98%   BMI 40.24 kg/m²     Gen: NAD, AAO x 3  HEENT:PEERL, no icterus  Heart: RRR, no M/R/G  Lungs: CTAB  Abd.: soft, NT, ND, BS +, no G/R, no HSM  Extr.: no C/C/E, no bruising      Stool (measured) : 0 mL  Lab Results   Component Value Date    WBC 5.6 02/27/2022    WBC 6.4 02/26/2022    WBC 6.4 02/25/2022    HGB 9.0 02/28/2022    HGB 8.7 02/27/2022    HGB 8.4 02/27/2022    HCT 28.6 02/27/2022    .9 02/27/2022    RDW 17.2 02/27/2022     02/27/2022     02/26/2022     02/25/2022     Lab Results   Component Value Date     02/27/2022    K 4.0 02/27/2022    K 4.7 02/26/2022     02/27/2022    CO2 21 02/27/2022    BUN 31 02/27/2022    CREATININE 1.7 02/27/2022    CALCIUM 9.1 02/27/2022    PROT 5.0 02/27/2022    LABALBU 2.2 02/27/2022    BILITOT 0.7 02/27/2022    BILITOT 0.7 02/26/2022    BILITOT 1.2 02/25/2022    ALKPHOS 72 02/27/2022    ALKPHOS 74 02/26/2022    ALKPHOS 105 02/25/2022    AST 11 02/27/2022    AST 29 02/26/2022 otherwise needs respiratory status improved as high risk for resp failure at this point. Cardiology note reviewed. Patient with acute anemia with hemoglobin 8.6 this morning. hemoglobin 7.1 on admission status post 1 unit of blood transfusion. Patient was evaluated this morning at bedside and refused medical care at this facility. Patient refused endoscopic evaluation. And is requesting to transfer [facility]. Primary team was made aware of patient request.  At this time hold on any kind of endoscopic evaluation. GI will follow PRN. Pt was discussed with  Dr. Jose Guadalupe Alba DO  GI Fellow   2/28/2022  10:00 AM    Patient seen and independently examined. Pertinent notes and lab work reviewed. Monitor reviewed showing sinus rhythm  Discussed with Dr. Gertrude Painting with physical exam and A&P. Discussed with patient - all questions answered - agreeable with the plan as delineated.     Leonard Bowman MD  3/1/2022  11:31 AM

## 2022-02-28 NOTE — CONSULTS
GENERAL SURGERY  CONSULT NOTE  2/28/2022    Physician Consulted: Dr. Osei Gayle  Reason for Consult: mckenzie-rectal lesion       CHRIS Walsh is a 73 y.o. female who presents for evaluation of rectal lesion. Pt has been followed by the GI team and had a recent C-scope about a year ago. Currently she is on BiPAP. On inspection of the mckenzie-rectal lesion there is circumferential discoloration with some mild lichenification. There is no tenderness on exam and has normal rectal tone.        Past Medical History:   Diagnosis Date    Abnormal EKG     left bundle branch block    Acute gout involving toe of right foot 9/3/2020    Acute gout involving toe of right foot 9/3/2020    Acute on chronic combined systolic (congestive) and diastolic (congestive) heart failure (HCC)     Cancer (HCC)     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    Cerebrovascular disease     Clotting disorder ([address] 1985    thrombophlebitis after c section delivery    Depression     15 years followed by dr Aylin Villalpando Hyperlipidemia     Hypertension     Hyperthyroidism     Leg swelling 9/3/2020    Lymphedema of both lower extremities 9/3/2020    Peripheral vascular disease ([address]     Renal failure 11/2012    renal transplant    Thrombophlebitis     after c section delivery    Thyroid disease        Past Surgical History:   Procedure Laterality Date   [address]    one normal delivery    COLECTOMY N/A 1/15/2022    DIAGNOSTIC  LAPAROSCOPY LYSIS OF ADHESIONS performed by Con Rudolph MD at [address] [place] [address] Drive CATH LAB PROCEDURE  2006    normal coronaries and 1996 normal coronaries    DIALYSIS FISTULA CREATION  march and july 2012    phase one and two patient will start dialysis when needed   [address]    transfemoral venous bypass grafts    IR IVC FILTER PLACEMENT W IMAGING  2/26/2022    IR IVC FILTER PLACEMENT W IMAGING 2/26/2022 [facility]ED[facility]    KIDNEY TRANSPLANT  2016    KIDNEY TRANSPLANT  2015    KIDNEY TRANSPLANT  2015    PARATHYROIDECTOMY  2006    left parathyroid  implant and parathyroidectomy       Medications Prior to Admission:    Prior to Admission medications    Medication Sig Start Date End Date Taking? Authorizing Provider   apixaban (ELIQUIS) 5 MG TABS tablet Take 5 mg by mouth 2 times daily Take in the morning and at bedtime for 3 months (started 2/19/2022)   Yes Historical Provider, MD   atorvastatin (LIPITOR) 10 MG tablet Take 10 mg by mouth daily   Yes Historical Provider, MD   furosemide (LASIX) 20 MG tablet  12/22/21  Yes Historical Provider, MD   lisinopril (PRINIVIL;ZESTRIL) 5 MG tablet take 1 tablet by mouth once daily 12/8/21  Yes Historical Provider, MD   pregabalin (LYRICA) 75 MG capsule  12/26/21  Yes Historical Provider, MD   allopurinol (ZYLOPRIM) 100 MG tablet Take 100 mg by mouth daily   Yes Historical Provider, MD   metoprolol succinate (TOPROL XL) 50 MG extended release tablet take 1 tablet by mouth once daily 4/17/20  Yes Alan Schwab MD   desvenlafaxine succinate (PRISTIQ) 50 MG TB24 extended release tablet Take 1 tablet by mouth every evening 12/20/19  Yes Nima Gross MD   cloZAPine (CLOZARIL) 50 MG tablet Take 50 mg by mouth nightly   Yes Historical Provider, MD   cycloSPORINE (SANDIMMUNE) 100 MG capsule Take 100 mg by mouth 2 times daily   Yes Historical Provider, MD   predniSONE (DELTASONE) 5 MG tablet Take 5 mg by mouth daily   Yes Historical Provider, MD   folic acid (FOLVITE) 1 MG tablet Take 1 mg by mouth daily   Yes Historical Provider, MD   docusate sodium (COLACE) 100 MG capsule Take 100 mg by mouth 2 times daily   Yes Historical Provider, MD   mycophenolate (CELLCEPT) 500 MG tablet Take 1,000 mg by mouth 2 times daily   Yes Historical Provider, MD   levothyroxine (SYNTHROID) 50 MCG tablet Take 50 mcg by mouth daily.      Yes Historical Provider, MD   nystatin (MYCOSTATIN) 088297 UNIT/GM powder Apply 3 times daily. 21   Dre Johansen, KATINA - CNP   ipratropium-albuterol (DUONEB) 0.5-2.5 (3) MG/3ML SOLN nebulizer solution Inhale 3 mLs into the lungs every 6 hours as needed for Shortness of Breath 21   Susannah Ramirez MD   budesonide (PULMICORT) 0.5 MG/2ML nebulizer suspension Take 2 mLs by nebulization 2 times daily for 10 days 21  Benjamin Frost MD   ondansetron (ZOFRAN-ODT) 4 MG disintegrating tablet Take 1 tablet by mouth every 8 hours as needed for Nausea or Vomiting 21   Irving Ramirez MD   Cyanocobalamin (B-12 COMPLIANCE INJECTION) 1000 MCG/ML KIT Inject as directed monthly    Historical Provider, MD   simvastatin (ZOCOR) 20 MG tablet Take 20 mg by mouth nightly. Historical Provider, MD       Allergies   Allergen Reactions    Macrodantin [Nitrofurantoin Macrocrystal]     Sulfa Antibiotics        Family History   Problem Relation Age of Onset    Diabetes Mother     Diabetes Father     Dementia Father        Social History     Tobacco Use    Smoking status: Former Smoker     Packs/day: 1.00     Years: 20.00     Pack years: 20.00     Quit date: 10/1/2015     Years since quittin.4    Smokeless tobacco: Never Used   Vaping Use    Vaping Use: Never used   Substance Use Topics    Alcohol use: No     Comment: 2 cups coffee per day    Drug use: No         Review of Systems   General ROS: negative  Hematological and Lymphatic ROS: negative  Respiratory ROS: BiPAP   Cardiovascular ROS: negative  Gastrointestinal ROS: negative  Genito-Urinary ROS: negative  Musculoskeletal ROS: negative      PHYSICAL EXAM:    Vitals:    22 0625   BP:    Pulse:    Resp: 17   Temp:    SpO2:        General Appearance:  awake, alert, oriented, in acute distress  Skin:  Skin color, texture, turgor normal. No rashes or lesions. Head/face:  NCAT  Eyes:  PERRL  Lungs:   BiPAP  Heart:  Heart regular rate and rhythm  Abdomen:  Soft, non-tender, non-distended   Extremities: pulses present in all extremities  Female Rectal: No hemorrhoids, normal rectal tone, no masses. circumferential discoloration with some mild lichenification. LABS:    CBC  Recent Labs     02/27/22  0344 02/27/22  0953 02/27/22  1602 02/27/22  2241 02/28/22  0357   WBC 5.6  --   --   --   --    HGB 7.1*   < > 8.4*   < > 9.0*   HCT 25.5*  --  28.6*  --   --      --   --   --   --     < > = values in this interval not displayed. BMP  Recent Labs     02/27/22  0344      K 4.0      CO2 21*   BUN 31*   CREATININE 1.7*   CALCIUM 9.1     Liver Function  Recent Labs     02/27/22 0344   BILITOT 0.7   AST 11   ALT 6   ALKPHOS 72   PROT 5.0*   LABALBU 2.2*     No results for input(s): LACTATE in the last 72 hours. No results for input(s): INR, PTT in the last 72 hours. Invalid input(s): PT    RADIOLOGY    CT HEAD WO CONTRAST    Result Date: 2/25/2022  EXAMINATION: CT OF THE HEAD WITHOUT CONTRAST  2/25/2022 3:08 am TECHNIQUE: CT of the head was performed without the administration of intravenous contrast. Dose modulation, iterative reconstruction, and/or weight based adjustment of the mA/kV was utilized to reduce the radiation dose to as low as reasonably achievable. COMPARISON: Correlation with MRI dated 01/16/2022 HISTORY: ORDERING SYSTEM PROVIDED HISTORY: mental status change TECHNOLOGIST PROVIDED HISTORY: Has a \"code stroke\" or \"stroke alert\" been called? ->No Reason for exam:->mental status change Decision Support Exception - unselect if not a suspected or confirmed emergency medical condition->Emergency Medical Condition (MA) FINDINGS: BRAIN/VENTRICLES: There is no acute intracranial hemorrhage, mass effect or midline shift. No abnormal extra-axial fluid collection. The gray-white differentiation is maintained without evidence of an acute infarct. There is no evidence of hydrocephalus. Minimal cortical volume loss. Scattered vascular calcifications.  ORBITS: The visualized portion of the orbits demonstrate no acute abnormality. SINUSES: Significant opacification of the left greater than right mastoid air cells similar to previous. Mild mucosal thickening in the left sphenoid sinus. This was also present previously. SOFT TISSUES/SKULL:  No acute abnormality of the visualized skull or soft tissues. No acute intracranial abnormality. MRI would be useful if symptoms persist. Inflammatory changes of left greater than right mastoid air cells and left sphenoid sinus similar to prior MRI. RECOMMENDATIONS: Unavailable     NM LUNG VENT/PERFUSION (VQ)    Result Date: 2/25/2022  EXAMINATION: NUCLEAR MEDICINE VENTILATION PERFUSION SCAN. 2/25/2022 TECHNIQUE: 3.5 millicuries aerosolized Tc99m DTPA was administered via mask prior to planar imaging of the lungs in multiple projections. Then, 4.2 millicuries of Tc 92F MAA was administered intravenously prior to planar imaging of the lungs in similar projections. COMPARISON: Chest CT February 25, 2022 . HISTORY: ORDERING SYSTEM PROVIDED HISTORY: R/O PE TECHNOLOGIST PROVIDED HISTORY: Reason for exam:->R/O PE What reading provider will be dictating this exam?->CRC FINDINGS: PERFUSION: Distribution of radiotracer is homogeneous. No segmental defects identified. VENTILATION: Ventilation images are unremarkable. CHEST CT: Small infiltrate or atelectasis posterior right lung. Negative for pulmonary embolus. CT ABDOMEN PELVIS W IV CONTRAST Additional Contrast? None    Result Date: 2/25/2022  EXAMINATION: CT OF THE ABDOMEN AND PELVIS WITH CONTRAST 2/25/2022 3:08 am TECHNIQUE: CT of the abdomen and pelvis was performed with the administration of intravenous contrast. Multiplanar reformatted images are provided for review. Dose modulation, iterative reconstruction, and/or weight based adjustment of the mA/kV was utilized to reduce the radiation dose to as low as reasonably achievable.  COMPARISON: 01/17/2022 HISTORY: ORDERING SYSTEM PROVIDED HISTORY: Abdominal distention, AMS TECHNOLOGIST PROVIDED HISTORY: Reason for exam:->Abdominal distention, AMS Additional Contrast?->None Decision Support Exception - unselect if not a suspected or confirmed emergency medical condition->Emergency Medical Condition (MA) FINDINGS: Many images are partially degraded by patient motion related artifact. Low-attenuation focus in the central aspect of the left lobe of the liver as well as a tiny low-attenuation focus in the caudate lobe. There is also a tiny low-attenuation focus in the extreme inferior aspect of the right lobe. These are difficult to definitively characterize due to their small size but likely represent small hepatic cysts. Gallbladder is unremarkable. Spleen is normal in size. No evidence of acute pancreatitis. There is a E small exophytic cystic appearing lesion along the anterior aspect of the body of the pancreas measuring 7 Hounsfield units and 1.4 cm. This is unchanged dating back to 11/17/2021. This is also favored to be incidental but could be followed. No adrenal mass. No hydronephrosis. The native kidneys remains significantly atrophic and contain numerous cystic lesions. Some of these are hyperdense but also unchanged dating back to the 11/17/2021 exam and favored to represent hyperdense or hemorrhagic cysts. Small renal cortical calcifications and or nonobstructing stones. A right lower quadrant renal transplant demonstrates no hydronephrosis. Small hiatal hernia. Mild fluid distention of a few small bowel loops in the upper abdomen. No bowel obstruction. The cecum is mobile. The appendix is not identified with certainty. There is some localized thickening of the proximal ascending colon. This does appear to be new compared to the previous examination. Moderate stool in the distal colon. Residual contrast in the colon likely due to contrast administered on the prior examination. Uterus is atrophic.   Urinary bladder is largely decompressed with Chin catheter. Minimal gas in the bladder likely due to the catheter. Partial visualization of femoral to femoral bypass graft. Degenerative changes are present in the spine and pelvis. Please see separate dictated report for CT of the chest.     Thickening of the proximal ascending colon. Given the short-term change, this is favored to represent a localized area of colitis likely infectious or inflammatory. Neoplasia thought less likely but follow-up to resolution would be recommended. Moderate stool in the distal colon. Minimal distention of several proximal small bowel loops likely incidental or due to mild enteritis. Small hiatal hernia. Other chronic appearing findings. RECOMMENDATIONS: Unavailable     IR GUIDED IVC FILTER PLACEMENT    Result Date: 2/26/2022  EXAMINATION: INFERIOR VENA CAVA (IVC) FILTER INSERTION 2/26/2022 Procedural Personnel: Reyes Rings, MD HISTORY: Indication: Bleeding with IV heparin ORDERING SYSTEM PROVIDED HISTORY: IVC filter TECHNOLOGIST PROVIDED HISTORY: Reason for exam:->IVC filter Anticoagulation contraindicated SEDATION: None CONTRAST: Contrast agent: Isovue 320 Contrast volume: 30mL FLUOROSCOPY DOSE AND TYPE OR TIME AND EXPOSURES: Fluoroscopy time/Number of Images: Fluoroscopy time 1.5 minutes. Reference [facility] [place] kerma: [id number] PROCEDURE: Informed consent for the procedure including risks, benefits and alternatives was obtained and time-out was performed prior to the procedure. Universal protocol was followed. A suitable skin site was prepared and draped using all elements of maximal sterile barrier technique including sterile gloves, sterile gown, cap, mask, large sterile sheet, sterile ultrasound probe cover, hand hygiene, and cutaneous antisepsis. Time-out was called and the patient's order and identity were verified. Local anesthesia was administered. The vessel was sonographically evaluated and judged appropriate for access.  Real time ultrasound was used to visualize needle entry into the vessel and an ultrasound image was stored in PACS. Vein accessed: Right common femoral vein. Access technique: Micropuncture set with 21 gauge needle A 0.035 guide wire was used to place a catheter into the inferior vena cava. A vena cavogram was performed. A Bard Rosa filter was deployed in routine fashion in the infrarenal IVC under fluoroscopic guidance. The sheath was removed and hemostasis was achieved with manual compression. A sterile dressing was applied. Patient tolerated procedure well. Complications: No immediate complications. Standardized report: [email or web address]_[email or web address] [id number] FINDINGS: US: The access vein is patent. Inferior Vena Cavogram: The vena cava is patent and normal in size without thrombus or anomalies. Post-placement imaging: Spot filmdemonstrates the filter in the infrarenalvena cava with less than 15 degrees tilt from the vena cava access. Successful vena cava filter placement. PLAN: Retrieval intent (MIPS): The filter is potentially retrievable. Plan/Timing For Retrieval: Ordering Physician/Contact For Retrieval Plan: Darren Wan     XR CHEST 1 VIEW    Result Date: 2/25/2022  EXAMINATION: ONE XRAY VIEW OF THE CHEST 2/25/2022 2:49 pm COMPARISON: None. HISTORY: ORDERING SYSTEM PROVIDED HISTORY: needs to be done with VQ scan TECHNOLOGIST PROVIDED HISTORY: Last chest Xray done more than 24 hours ago Reason for exam:->needs to be done with VQ scan FINDINGS: The heart is mildly enlarged. There are no findings of failure. The lungs are clear. There is no focal infiltrate or effusion. .     1. Mild cardiomegaly. There is no evidence of failure or pneumonia. CTA PULMONARY W CONTRAST    Result Date: 2/25/2022  EXAMINATION: CTA OF THE CHEST 2/25/2022 3:08 am TECHNIQUE: CTA of the chest was performed after the administration of intravenous contrast.  Multiplanar reformatted images are provided for review. MIP images are provided for review. Dose modulation, iterative reconstruction, and/or weight based adjustment of the mA/kV was utilized to reduce the radiation dose to as low as reasonably achievable. COMPARISON: Portable chest dated 01/22/2022 and CT dated 01/17/2022 HISTORY: ORDERING SYSTEM PROVIDED HISTORY: Hypoxic, SOB TECHNOLOGIST PROVIDED HISTORY: Reason for exam:->Hypoxic, SOB Decision Support Exception - unselect if not a suspected or confirmed emergency medical condition->Emergency Medical Condition (MA) FINDINGS: Pulmonary Arteries: Assessment is limited due to patient motion artifact. A small or peripheral embolism would be difficult to exclude but no large or central embolism identified. Mediastinum: Moderately severe cardiomegaly is similar to previous. No pericardial effusion. No aortic dissection. Scattered vascular calcifications. Normal-size lymph nodes without adenopathy by size criteria. Lungs/pleura: No pneumothorax. Mild atelectasis in the right greater than left lung base. Otherwise, there has been significant improvement in aeration of the lungs since previous. Upper Abdomen: Partial visualization of the upper right kidney with cystic appearing foci as well as a partially visualized hyperdense lesion likely representing a hyperdense proteinaceous cyst and similar to previous but continued follow-up would be useful. Visualized portions of the upper abdomen demonstrate no acute abnormality. Small hiatal hernia. Soft Tissues/Bones: Degenerative changes are scattered in the spine. Allowing for patient motion artifact, no identified pulmonary embolism. Significant improvement in aeration of the lungs with some residual areas of presumed atelectasis in the right greater than left base. Cardiomegaly.  RECOMMENDATIONS: Unavailable     IR ULTRASOUND GUIDANCE VASCULAR ACCESS    Result Date: 2/26/2022  EXAMINATION: INFERIOR VENA CAVA (IVC) FILTER INSERTION 2/26/2022 Procedural Personnel: Julian Alfredo MD HISTORY: Indication: Bleeding with IV heparin ORDERING SYSTEM PROVIDED HISTORY: IVC filter TECHNOLOGIST PROVIDED HISTORY: Reason for exam:->IVC filter Anticoagulation contraindicated SEDATION: None CONTRAST: Contrast agent: Isovue 320 Contrast volume: 30mL FLUOROSCOPY DOSE AND TYPE OR TIME AND EXPOSURES: Fluoroscopy time/Number of Images: Fluoroscopy time 1.5 minutes. Reference air [place] kerma: [id number] PROCEDURE: Informed consent for the procedure including risks, benefits and alternatives was obtained and time-out was performed prior to the procedure. Universal protocol was followed. A suitable skin site was prepared and draped using all elements of maximal sterile barrier technique including sterile gloves, sterile gown, cap, mask, large sterile sheet, sterile ultrasound probe cover, hand hygiene, and cutaneous antisepsis. Time-out was called and the patient's order and identity were verified. Local anesthesia was administered. The vessel was sonographically evaluated and judged appropriate for access. Real time ultrasound was used to visualize needle entry into the vessel and an ultrasound image was stored in PACS. Vein accessed: Right common femoral vein. Access technique: Micropuncture set with 21 gauge needle A 0.035 guide wire was used to place a catheter into the inferior vena cava. A vena cavogram was performed. A Bard Orange filter was deployed in routine fashion in the infrarenal IVC under fluoroscopic guidance. The sheath was removed and hemostasis was achieved with manual compression. A sterile dressing was applied. Patient tolerated procedure well. Complications: No immediate complications. Standardized report: SIR_IVCFilterInsertion2.0 [id number] FINDINGS: US: The access vein is patent. Inferior Vena Cavogram: The vena cava is patent and normal in size without thrombus or anomalies.  Post-placement imaging: Spot filmdemonstrates the filter in the infrarenalvena cava with less than 15 degrees tilt from the vena cava access. Successful vena cava filter placement. PLAN: Retrieval intent (MIPS): The filter is potentially retrievable. Plan/Timing For Retrieval: Ordering Physician/Contact For Retrieval Plan: Marisa GUERRIER MODIFIED BARIUM SWALLOW W VIDEO    Result Date: 2/26/2022  EXAMINATION: MODIFIED BARIUM SWALLOW WAS PERFORMED IN CONJUNCTION WITH SPEECH PATHOLOGY SERVICES TECHNIQUE: Fluoroscopic evaluation of the swallowing mechanism was performed using cineradiography with multiple consistency of barium product in conjunction with speech pathology services. FLUOROSCOPY DOSE AND TYPE OR TIME AND EXPOSURES: Fluoroscopy time 2.1 minutes. Air kerma 5.49 mGy COMPARISON: None HISTORY: ORDERING SYSTEM PROVIDED HISTORY: dysphagia, aspriation pna? TECHNOLOGIST PROVIDED HISTORY: Reason for exam:->dysphagia, aspriation pna? FINDINGS: There was oral delay and pharyngeal delay. Laryngeal elevation was adequate. There was retention in the piriform sinuses but not the vallecula. Transient laryngeal penetration is seen with thin liquid barium. Otherwise, no aspiration or laryngeal penetration is seen. Strands it laryngeal penetration with thin liquid barium. No evidence of aspiration. Please see separate speech pathology report for full discussion of findings and recommendations. RECOMMENDATIONS: Unavailable     US DUP LOWER EXTREMITIES BILATERAL VENOUS    Result Date: 2/25/2022  EXAMINATION: DUPLEX VENOUS ULTRASOUND OF THE BILATERAL LOWER EXTREMITIES2/25/2022 2:47 pm TECHNIQUE: Duplex ultrasound using B-mode/gray scaled imaging, Doppler spectral analysis and color flow Doppler was obtained of the deep venous structures of the lower bilateral extremities. COMPARISON: None.  HISTORY: ORDERING SYSTEM PROVIDED HISTORY: R/O DVT TECHNOLOGIST PROVIDED HISTORY: Reason for exam:->R/O DVT What reading provider will be dictating this exam?->CRC FINDINGS: Right lower extremity:  The visualized veins of the bilateral lower extremities are patent and free of echogenic thrombus. The veins demonstrate good compressibility with normal color flow study and spectral analysis. Left lower extremity: There are incompletely occlusive thrombi within the mid and distal left femoral vein and the popliteal vein. They have developed in the interim since the prior lower extremity Doppler from 11/17/2021.     1. INCOMPLETELY OCCLUSIVE THROMBI in the mid and distal LEFT femoral vein and within the popliteal vein. They have developed in the interim since the previous study from 11/17/2021. 2. No evidence of DVT in the right lower extremity. RECOMMENDATIONS: Unavailable         ASSESSMENT:  73 y.o. female with mckenzie-rectal lesion    PLAN:  Rectal lesion: circumferential discoloration with some mild lichenification.   No acute general surgical intervention  Agree with GI, currently no a good candidate for any procedures -- will benefit from biopsy of area  Will defer to GI for biopsy and c-scope      Electronically signed by Mariah Chiang MD, Alissa Rodríguez DO on 2/28/22 at 7:15 AM EST

## 2022-02-28 NOTE — PROGRESS NOTES
[address] [place], [place] [address]ist   Progress Note    Admitting Date and Time: 2/25/2022  1:08 AM  Admit Dx: Dehydration [E86.0]  Colitis [K52.9]  Hypoxia [R09.02]  Elevated troponin [R77.8]  Acute respiratory failure with hypoxia (HCC) [J96.01]  Altered mental status, unspecified altered mental status type [R41.82]    Subjective:    Pt seen in her room sitting up in bed. Patient is NPO for EGD today. Patient has inquired about eating and the time of her scope. Patient denies any complaints of chest or abdominal pain. No weston bleeding noted. ROS: denies fever, chills, cp, sob, n/v, HA unless stated above.      lidocaine  5 mL IntraDERmal Once    sodium chloride flush  5-40 mL IntraVENous 2 times per day    heparin flush  1 mL IntraVENous 2 times per day    pantoprazole  40 mg IntraVENous BID    sodium chloride flush  5-40 mL IntraVENous 2 times per day    ipratropium-albuterol  1 ampule Inhalation 4x daily    allopurinol  100 mg Oral Daily    budesonide  500 mcg Nebulization BID    cloZAPine  50 mg Oral Nightly    cycloSPORINE  100 mg Oral BID    desvenlafaxine succinate  50 mg Oral QPM    docusate sodium  100 mg Oral BID    folic acid  1 mg Oral Daily    levothyroxine  50 mcg Oral Daily    predniSONE  5 mg Oral Daily    mycophenolate  1,000 mg Oral BID    piperacillin-tazobactam  3,375 mg IntraVENous Q8H    sodium chloride  25 mL IntraVENous Q8H     sodium chloride, , PRN  sodium chloride flush, 5-40 mL, PRN  sodium chloride, 25 mL, PRN  heparin flush, 1 mL, PRN  sodium chloride flush, 5-40 mL, PRN  sodium chloride, 25 mL, PRN  ondansetron, 4 mg, Q8H PRN   Or  ondansetron, 4 mg, Q6H PRN  polyethylene glycol, 17 g, Daily PRN  acetaminophen, 650 mg, Q6H PRN   Or  acetaminophen, 650 mg, Q6H PRN         Objective:    /68   Pulse 82   Temp 98.4 °F (36.9 °C) (Infrared)   Resp 18   Ht 5' 2\" (1.575 m)   Wt 220 lb (99.8 kg)   SpO2 98%   BMI 40.24 kg/m²   General Appearance: alert and intent (MIPS): The filter is potentially retrievable. Plan/Timing For Retrieval:      Ordering Physician/Contact For Retrieval Plan: Yuliana AGUIRRE ULTRASOUND GUIDANCE VASCULAR ACCESS   Final Result   Successful vena cava filter placement. PLAN:   Retrieval intent (MIPS): The filter is potentially retrievable. Plan/Timing For Retrieval:      Ordering Physician/Contact For Retrieval Plan: Yuliana GUERRIER MODIFIED BARIUM SWALLOW W VIDEO   Final Result   Strands it laryngeal penetration with thin liquid barium. No evidence of   aspiration. Please see separate speech pathology report for full discussion of findings   and recommendations. RECOMMENDATIONS:   Unavailable         US DUP LOWER EXTREMITIES BILATERAL VENOUS   Final Result   1. INCOMPLETELY OCCLUSIVE THROMBI in the mid and distal LEFT femoral vein and   within the popliteal vein. They have developed in the interim since the   previous study from 11/17/2021.   2. No evidence of DVT in the right lower extremity. RECOMMENDATIONS:   Unavailable         NM LUNG VENT/PERFUSION (VQ)   Final Result   Negative for pulmonary embolus. XR CHEST 1 VIEW   Final Result   1. Mild cardiomegaly. There is no evidence of failure or pneumonia. CT HEAD WO CONTRAST   Final Result   No acute intracranial abnormality. MRI would be useful if symptoms persist.      Inflammatory changes of left greater than right mastoid air cells and left   sphenoid sinus similar to prior MRI. RECOMMENDATIONS:   Unavailable         CTA PULMONARY W CONTRAST   Final Result   Allowing for patient motion artifact, no identified pulmonary embolism. Significant improvement in aeration of the lungs with some residual areas of   presumed atelectasis in the right greater than left base. Cardiomegaly.       RECOMMENDATIONS:   Unavailable         CT ABDOMEN PELVIS W IV CONTRAST Additional Contrast? None   Final Result   Thickening of the proximal ascending colon. Given the short-term change,   this is favored to represent a localized area of colitis likely infectious or   inflammatory. Neoplasia thought less likely but follow-up to resolution   would be recommended. Moderate stool in the distal colon. Minimal distention of several proximal small bowel loops likely incidental or   due to mild enteritis. Small hiatal hernia. Other chronic appearing findings. RECOMMENDATIONS:   Unavailable             Assessment:  Principal Problem:    Acute respiratory failure with hypoxia (HCC)  Active Problems:    Hypoxia    Acute metabolic encephalopathy    Acute deep vein thrombosis (DVT) of femoral vein of left lower extremity (HCC)    Anemia    Positive blood culture  Resolved Problems:    * No resolved hospital problems. *      Plan:  1. Acute respiratory failure with hypoxia - O2 3L NC intact -Pulmicort and DuoNeb continue - breathing easy - critical care pulmonology on - will continue monitor respiratory status closely     2. Jania rectal lesion - circumferential discoloration with some mild lichenification - no surgical intervention at this time - possible biopsy at some point but will be up to GI as to when per surgery     3. Acute metabolic encephalopathy - now alert and oriented - follows simple commands     4. Acute kidney injury on chronic renal failure - BUN/creatinine elevated - labs pending - nephrology following - continue avoid all nephrotoxic medications     5. Bacteremia - positive culture - - ID on - continue IV Zosyn per ID's recommendation - afebrile     6. Anemia - H&H 9.0 after unit PRBCs - NPO for EGD today - GI following - will awaiting report  - Folic acid - on PPI     7. Left femoral and popliteal DVT - was on Eliquis - now on HOLD d/t GI bleed - S/p IVC filter by IR on 2/26/22    8. Proximal atrial fibrillation - currently sinus - holding anticoagulation d/t possible bleeding     9.  COPD - supplemental oxygen - continue breathing treatment     10. Colitis - no diarrhea or abdominal pain at this time - GI following     11. Obstructive sleep apnea - wears BiPAP at HS     12. Hypothyroidism - controlled with medication     13. Morbidly obesity - BMI > 40 - most likely due to taking in more calories than the body require    14. History of a renal transplant - S/p renal transplant - continue anti rejection medication - renal following     NOTE: This report was transcribed using voice recognition software. Every effort was made to ensure accuracy; however, inadvertent computerized transcription errors may be present.      Electronically signed by KATINA Powers CNP on 2/28/2022 at 11:10 AM

## 2022-02-28 NOTE — CARE COORDINATION
Spoke with Dr. Erwin Harrington (nephrology) via [employer]. Patient OK for PICC line. Order placed.

## 2022-02-28 NOTE — FLOWSHEET NOTE
[facility] staff:  Patient was off the floor today, getting CLEMENCIA according nursing staff  We will follow labs and clinical condition tomorrow

## 2022-02-28 NOTE — PLAN OF CARE
Problem: Skin Integrity:  Goal: Will show no infection signs and symptoms  Description: Will show no infection signs and symptoms  2/28/2022 1447 by Seb Lin RN  Outcome: Ongoing  2/28/2022 0610 by Haresh Elliott RN  Outcome: Ongoing  Goal: Absence of new skin breakdown  Description: Absence of new skin breakdown  2/28/2022 1447 by Seb Lin RN  Outcome: Ongoing  2/28/2022 0610 by Haresh Elliott RN  Outcome: Ongoing     Problem: Falls - Risk of:  Goal: Will remain free from falls  Description: Will remain free from falls  2/28/2022 1447 by Seb Lin RN  Outcome: Ongoing  2/28/2022 0610 by Haresh Elliott RN  Outcome: Met This Shift  Goal: Absence of physical injury  Description: Absence of physical injury  2/28/2022 1447 by Seb Lin RN  Outcome: Ongoing  2/28/2022 0610 by Haresh Elliott RN  Outcome: Met This Shift

## 2022-02-28 NOTE — PROGRESS NOTES
Report received from nightshift RN. Patient awake and alert in bed, oriented x4, with no complaints of pain or discomfort at this time. Repositioned in bed per patient request; patient informed of NPO status for CLEMENCIA, attests to understanding. Vital signs reviewed. Telemetry monitor NSR HR 82bpm. SpO2 98% on 3L NC. Labs and orders reviewed. Will assume care of patient.

## 2022-02-28 NOTE — PROGRESS NOTES
metabolic encephalopathy    Acute deep vein thrombosis (DVT) of femoral vein of left lower extremity (HCC)    Anemia    Positive blood culture        ASSESSMENT of Current Deficits Patient exhibits decreased strength, balance, endurance, range of motion and coordination impairing functional mobility, transfers, tolerance to activity and participation confusion present. Decreased strength, balance and endurance  increases patient's risk for fall. Unable to use LUE, needing edema control, elevation and skin integrity. AAROM with all supine exercise. Patient needs continued PT to improve strength and endurance to tolerate and complete functional mobility with decreased assist required. PHYSICAL THERAPY  PLAN OF CARE       Physical therapy plan of care is established based on physician order,  patient diagnosis and clinical assessment    Current Treatment Recommendations:    -Bed Mobility: Lower extremity exercises , Upper extremity exercises  and Trunk control activities   -Sitting Balance: Hands on support to maintain midline , Facilitate active trunk muscle engagement  and Facilitate postural control in all planes   -Standing Balance: Perform strengthening exercises in standing to promote motor control with or without upper extremity support  and wt shifting and guided loading onto bilataerl lower ext  -Transfers: Provide instruction on proper hand and foot position for adequate transfer of weight onto lower extremities and use of gait device if needed, Cues for hand placement, technique and safety. Provide stabilization to prevent fall  and Support transfer of weight on to lower extremities  -Endurance: Utilize Supervised activities to increase level of endurance to allow for safe functional mobility including transfers and gait     PT long term treatment goals are located in below grid    Patient and or family understand(s) diagnosis, prognosis, and plan of care.     Frequency of treatments: Patient will be seen  daily. Prior Level of Function: Patient non ambulatory > 4 weeks  Rehab Potential: fair    for baseline    Past medical history:   Past Medical History:   Diagnosis Date    Abnormal EKG     left bundle branch block    Acute gout involving toe of right foot 2020    Acute on chronic combined systolic (congestive) and diastolic (congestive) heart failure (HCC)     Cancer (HCC)     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    Cerebrovascular disease     Clotting disorder ([facility] [address] 1985    thrombophlebitis after c section delivery    Depression     15 years followed by dr Nilsa Mahajan Hyperlipidemia     Hypertension     Hypothyroidism     Leg swelling 2020    Lymphedema of both lower extremities 2020    Peripheral vascular disease ([facility] [address]     Renal failure 2012    renal transplant    Thrombophlebitis     after c section delivery    Thyroid disease      Past Surgical History:   Procedure Laterality Date     SECTION  1985    one normal delivery    COLECTOMY N/A 1/15/2022    DIAGNOSTIC  LAPAROSCOPY LYSIS OF ADHESIONS performed by Tremaine Yarbrough MD at [address] CATH LAB PROCEDURE      normal coronaries and  normal coronaries    DIALYSIS FISTULA CREATION  march and 2012    phase one and two patient will start dialysis when needed   [address]    transfemoral venous bypass grafts    IR IVC FILTER PLACEMENT W IMAGING  2022    IR IVC FILTER PLACEMENT W IMAGING 2022 [facility]    KIDNEY TRANSPLANT  2016    KIDNEY TRANSPLANT  2015    KIDNEY TRANSPLANT  2015    PARATHYROIDECTOMY  2006    left parathyroid  implant and parathyroidectomy       SUBJECTIVE:    Precautions:  Up with assistance, falls, alarm and O2 ,  2 Liters of o2 via nasal cannula   Social history: Patient lives in a skilled nursing facility      Equipment owned: unknown,       [id number] [facility]  Mobility Inpatient   How much difficulty turning over in bed?: A Lot  How much difficulty sitting down on / standing up from a chair with arms?: Unable  How much difficulty moving from lying on back to sitting on side of bed?: A Lot  How much help from another person moving to and from a bed to a chair?: Total  How much help from another person needed to walk in hospital room?: Total  How much help from another person for climbing 3-5 steps with a railing?: Total  AM-PAC Inpatient Mobility Raw Score : 8  AM-PAC Inpatient T-Scale Score : 28.52  Mobility Inpatient CMS 0-100% Score: 86.62  Mobility Inpatient CMS G-Code Modifier : CM    Nursing cleared patient for PT treatment. patient with complaints of being sticked with a needle too many times in her right arm  OBJECTIVE;   Initial Evaluation  Date: 2/25/2022 Treatment Date:  2/28/2022     Short Term/ Long Term   Goals   Was pt agreeable to Eval/treatment? Yes Yes  To be met in 5 days   Pain level   None reported None     Bed Mobility    Rolling: Maximal assist of  2    Supine to sit: Maximal assist of 1    Sit to supine: Maximal assist of 1    Scooting: Dependent assist of 1   Rolling: Not assessed    Supine to sit: Not assessed    Sit to supine: Not assessed    Scooting: Not assessed     Rolling: Moderate assist of 1    Supine to sit: Moderate assist of 1    Sit to supine: Moderate assist of 1    Scooting:  Moderate assist of 1     ROM Within functional limits  with exception of bilateral ankles  Increase range of motion 10% of affected joints    Strength LUE:  1/5   RUE 2/5  RLE:  2/5  LLE:  2/5  Increase strength in affected mm groups by 1/3 grade   Balance Sitting EOB:  poor lateral lean to right with inability to shift to left and sit midline    Sitting EOB: not assessed    Sitting EOB:  fair          Patient is Alert & Oriented x person and follows one step directions    Sensation:  not assessed   Edema:  yes bilateral lower extremities and left upper

## 2022-02-28 NOTE — ANESTHESIA PRE PROCEDURE
Department of Anesthesiology  Preprocedure Note       Name:  Shawn Warren   Age:  73 y.o.  :  1955                                          MRN:  [id number]         Date:  2022      Surgeon: Delonte Martines):  Brianne Chan MD    Procedure: Procedure(s):  TRANSESOPHAGEAL ECHOCARDIOGRAM    Medications prior to admission:   Prior to Admission medications    Medication Sig Start Date End Date Taking? Authorizing Provider   apixaban (ELIQUIS) 5 MG TABS tablet Take 5 mg by mouth 2 times daily Take in the morning and at bedtime for 3 months (started 2022)    Historical Provider, MD   atorvastatin (LIPITOR) 10 MG tablet Take 10 mg by mouth daily    Historical Provider, MD   furosemide (LASIX) 20 MG tablet  21   Historical Provider, MD   lisinopril (PRINIVIL;ZESTRIL) 5 MG tablet take 1 tablet by mouth once daily 21   Historical Provider, MD   pregabalin (LYRICA) 75 MG capsule  21   Historical Provider, MD   nystatin (MYCOSTATIN) 589950 UNIT/GM powder Apply 3 times daily.  21   Miguel Jauregui, KATNIA - CNP   ipratropium-albuterol (DUONEB) 0.5-2.5 (3) MG/3ML SOLN nebulizer solution Inhale 3 mLs into the lungs every 6 hours as needed for Shortness of Breath 21   Susannah Ramirez MD   budesonide (PULMICORT) 0.5 MG/2ML nebulizer suspension Take 2 mLs by nebulization 2 times daily for 10 days 21  Miriam Aguilera MD   ondansetron (ZOFRAN-ODT) 4 MG disintegrating tablet Take 1 tablet by mouth every 8 hours as needed for Nausea or Vomiting 21   Miriam Aguilera MD   allopurinol (ZYLOPRIM) 100 MG tablet Take 100 mg by mouth daily    Historical Provider, MD   Cyanocobalamin (B-12 COMPLIANCE INJECTION) 1000 MCG/ML KIT Inject as directed monthly    Historical Provider, MD   metoprolol succinate (TOPROL XL) 50 MG extended release tablet take 1 tablet by mouth once daily 20   Judie Tee MD   desvenlafaxine succinate (PRISTIQ) 50 MG TB24 extended release tablet at 02/27/22 2145    0.9 % sodium chloride infusion   IntraVENous Continuous Kalpesh Land, APRN -  mL/hr at 02/28/22 4295 New Bag at 02/28/22 2955    sodium chloride flush 0.9 % injection 5-40 mL  5-40 mL IntraVENous 2 times per day Paige Farrell MD   10 mL at 02/27/22 2206    sodium chloride flush 0.9 % injection 5-40 mL  5-40 mL IntraVENous PRN Trista Ramirez MD        0.9 % sodium chloride infusion  25 mL IntraVENous PRN Paige Farrell MD        ondansetron (ZOFRAN-ODT) disintegrating tablet 4 mg  4 mg Oral Q8H PRN Trista Ramirez MD        Or    ondansetron (ZOFRAN) injection 4 mg  4 mg IntraVENous Q6H PRN Susannah Ramirez MD        polyethylene glycol (GLYCOLAX) packet 17 g  17 g Oral Daily PRN Paige Farrell MD        acetaminophen (TYLENOL) tablet 650 mg  650 mg Oral Q6H PRN Paige Farrell MD   650 mg at 02/27/22 3463    Or    acetaminophen (TYLENOL) suppository 650 mg  650 mg Rectal Q6H PRN Paige Farrell MD        ipratropium-albuterol (DUONEB) nebulizer solution 1 ampule  1 ampule Inhalation 4x daily Paige Farrell MD   1 ampule at 02/28/22 5806    allopurinol (ZYLOPRIM) tablet 100 mg  100 mg Oral Daily Susannah Ramirez MD   100 mg at 02/27/22 0841    budesonide (PULMICORT) nebulizer suspension 500 mcg  500 mcg Nebulization BID Paige Farrell MD   500 mcg at 02/28/22 8302    cloZAPine (CLOZARIL) tablet 50 mg  50 mg Oral Nightly Susannah Ramirez MD   50 mg at 02/27/22 2147    cycloSPORINE (SANDIMMUNE) capsule 100 mg  100 mg Oral BID Trista Ramirez MD   100 mg at 02/27/22 2146    desvenlafaxine succinate (PRISTIQ) extended release tablet 50 mg  50 mg Oral QPM Susannah Ramirez MD   50 mg at 02/27/22 1802    docusate sodium (COLACE) capsule 100 mg  100 mg Oral BID Trista Ramirez MD   100 mg at 68/23/80 8520    folic acid (FOLVITE) tablet 1 mg  1 mg Oral Daily Trista Ramirez MD   1 mg at 02/27/22 0841    levothyroxine (SYNTHROID) tablet 50 mcg  50 mcg Oral Daily Susannah PORTER MD James   50 mcg at 02/28/22 0547    predniSONE (DELTASONE) tablet 5 mg  5 mg Oral Daily Ronni Stone MD   5 mg at 02/27/22 0841    mycophenolate (CELLCEPT) capsule 1,000 mg  1,000 mg Oral BID Aplpe Ramirez MD   1,000 mg at 02/27/22 2147    piperacillin-tazobactam (ZOSYN) 3,375 mg in dextrose 5 % 50 mL IVPB extended infusion (mini-bag)  3,375 mg IntraVENous Nicholas Gray MD   Stopped at 02/28/22 0912    0.9 % sodium chloride bolus  25 mL IntraVENous Nicholas Gray MD   Stopped at 02/28/22 9208       Allergies: Allergies   Allergen Reactions    Macrodantin [Nitrofurantoin Macrocrystal]     Sulfa Antibiotics        Problem List:    Patient Active Problem List   Diagnosis Code    Peripheral vascular disease ([facility] I73.9    Depression F32. A    Clotting disorder ([facility]) D68.9    Abnormal EKG R94.31    Cancer ([facility]) C80.1    Over weight E66.3    S/p cadaver renal transplant Z94.0    ESRD (end stage renal disease) ([facility]) N18.6    Non morbid obesity due to excess calories E66.09    Hypertension I10    Leg swelling M79.89    Lymphedema of both lower extremities I89.0    Acute gout involving toe of right foot M10.9    Sepsis secondary to UTI ([facility]) A41.9, N39.0    Acute kidney injury superimposed on CKD ([facility]) N17.9, N18.9    Thyroid disease E07.9    Acute on chronic combined systolic (congestive) and diastolic (congestive) heart failure ([facility]) I50.43    Sepsis ([facility]) A41.9    Acute renal failure ([facility]) N17.9    CECILY (acute kidney injury) ([facility]) N17.9    Ischemic bowel disease ([facility]) K55.9    Altered mental status R41.82    Coma ([facility] R40.20    Acute respiratory failure with hypoxia ([facility]) J96.01    Hypoxia R09.02    Acute metabolic encephalopathy P53.40    Acute deep vein thrombosis (DVT) of femoral vein of left lower extremity ([facility]) I82.412    Anemia D64.9    Positive blood culture R78.81       Past Medical History:        Diagnosis Date    Abnormal EKG left bundle branch block    Acute gout involving toe of right foot 2020    Acute on chronic combined systolic (congestive) and diastolic (congestive) heart failure (HCC)     Cancer (HCC)     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    Cerebrovascular disease     Clotting disorder ([facility] 1985    thrombophlebitis after c section delivery    Depression     15 years followed by dr Camilo Gooden Hyperlipidemia     Hypertension     Hypothyroidism     Leg swelling 2020    Lymphedema of both lower extremities 2020    Peripheral vascular disease ([facility]     Renal failure 2012    renal transplant    Thrombophlebitis     after c section delivery    Thyroid disease        Past Surgical History:        Procedure Laterality Date   [address]    one normal delivery    COLECTOMY N/A 1/15/2022    DIAGNOSTIC  LAPAROSCOPY LYSIS OF ADHESIONS performed by Zoe Soni MD at [address] [place] [address] Drive CATH LAB PROCEDURE      normal coronaries and  normal coronaries    DIALYSIS FISTULA CREATION  march and 2012    phase one and two patient will start dialysis when needed   [address]    transfemoral venous bypass grafts    IR IVC FILTER PLACEMENT W IMAGING  2022    IR IVC FILTER PLACEMENT W IMAGING 2022 [facility]ED[facility]    KIDNEY TRANSPLANT  2016    KIDNEY TRANSPLANT  2015    KIDNEY TRANSPLANT  2015    PARATHYROIDECTOMY  2006    left parathyroid  implant and parathyroidectomy       Social History:    Social History     Tobacco Use    Smoking status: Former Smoker     Packs/day: 1.00     Years: 20.00     Pack years: 20.00     Quit date: 10/1/2015     Years since quittin.4    Smokeless tobacco: Never Used   Substance Use Topics    Alcohol use: No     Comment: 2 cups coffee per day                                Counseling given: Not Answered      Vital Signs (Current):    There were no vitals filed for this visit. BP Readings from Last 3 Encounters:   02/28/22 127/68   01/25/22 128/78   01/15/22 (!) 143/58       NPO Status:                                                                                 BMI:   Wt Readings from Last 3 Encounters:   02/25/22 220 lb (99.8 kg)   01/19/22 216 lb (98 kg)   11/17/21 227 lb 3.2 oz (103.1 kg)     There is no height or weight on file to calculate BMI.    CBC:   Lab Results   Component Value Date    WBC 5.6 02/27/2022    RBC 2.22 02/27/2022    HGB 9.0 02/28/2022    HCT 28.6 02/27/2022    .9 02/27/2022    RDW 17.2 02/27/2022     02/27/2022       CMP:   Lab Results   Component Value Date     02/27/2022    K 4.0 02/27/2022    K 4.7 02/26/2022     02/27/2022    CO2 21 02/27/2022    BUN 31 02/27/2022    CREATININE 1.7 02/27/2022    GFRAA 36 02/27/2022    LABGLOM 30 02/27/2022    GLUCOSE 100 02/27/2022    PROT 5.0 02/27/2022    CALCIUM 9.1 02/27/2022    BILITOT 0.7 02/27/2022    ALKPHOS 72 02/27/2022    AST 11 02/27/2022    ALT 6 02/27/2022       POC Tests: No results for input(s): POCGLU, POCNA, POCK, POCCL, POCBUN, POCHEMO, POCHCT in the last 72 hours.     Coags:   Lab Results   Component Value Date    PROTIME 13.0 01/23/2022    INR 1.1 01/23/2022    APTT >240.0 02/26/2022       HCG (If Applicable): No results found for: PREGTESTUR, PREGSERUM, HCG, HCGQUANT     ABGs:   Lab Results   Component Value Date    PO2ART 62.3 01/21/2022    JFW9PPZ 48.9 01/21/2022    KKG7GOM 30.9 01/21/2022        Type & Screen (If Applicable):  No results found for: LABABO, LABRH    Drug/Infectious Status (If Applicable):  No results found for: HIV, HEPCAB    COVID-19 Screening (If Applicable):   Lab Results   Component Value Date    COVID19 Not Detected 02/25/2022       CT Scan Abdomen/Pelvis 1/14/2022:    Impression   There is pneumatosis involving the ascending to transverse colon.  There is   wall thickening of the splenic flexure to descending colon.  This is   nonspecific but can be seen related to ischemic enteritis but there is no   evidence for portal venous gas,, medication induced pneumatosis in a patient   taking steroids or chemotherapy, procedure related.  Clinical correlation   recommended.  Surgical consultation may be needed.       There is fairly dense bibasilar infiltrates which have progressed from prior   exam.  Correlation with fever and white count and follow-up to resolution   recommended.       Multiple mixed density cysts both kidneys related to chronic renal failure   with the transplant right lower quadrant.       There is distention of small bowel in the lower pelvis without obstructing   lesion identified.       Small hiatal hernia. Anesthesia Evaluation  Patient summary reviewed no history of anesthetic complications:   Airway: Mallampati: Unable to assess / NA  TM distance: <3 FB   Neck ROM: limited  Mouth opening: < 3 FB Dental:          Pulmonary: breath sounds clear to auscultation                             Cardiovascular:    (+) hypertension:, CHF: diastolic, hyperlipidemia      ECG reviewed  Rhythm: regular  Rate: normal  Echocardiogram reviewed         Beta Blocker:  Dose within 24 Hrs      ROS comment: EKG: Normal sinus rhythm 98, Possible Left atrial enlargement  Left axis deviation, Left bundle branch block    ECHO:   Mild concentric left ventricular hypertrophy. There is doppler evidence of stage I diastolic dysfunction. Ejection fraction is visually estimated at 60 to 65%. Normal right ventricular size and function.    Mild tricuspid regurgitation     Neuro/Psych:   (+) psychiatric history:depression/anxiety             GI/Hepatic/Renal:   (+) renal disease (s/p cadaver kidney transplant in 2015):, morbid obesity          Endo/Other:    (+) hyperthyroidism, blood dyscrasia (Elevated WBC)::., .                  ROS comment: Sepsis Abdominal:   (+) obese,           Vascular:   + PVD, aortic or cerebral (s/p femoral bypass), . Other Findings:               Anesthesia Plan      general     ASA 4 - emergent       Induction: intravenous and rapid sequence. MIPS: Postoperative opioids intended, Prophylactic antiemetics administered and Postoperative trial extubation. Anesthetic plan and risks discussed with patient and child/children. Plan discussed with CRNA. Attending anesthesiologist reviewed and agrees with Preprocedure content        DOS STAFF ADDENDUM:    Pt seen and examined, chart reviewed (including anesthesia, drug and allergy history). Anesthetic plan, risks, benefits, alternatives, and personnel involved discussed with patient. Patient verbalized an understanding and agrees to proceed. Plan discussed with care team members and agreed upon.     Rajiv Perla MD  Staff Anesthesiologist  10:50 AM      Rajiv Pelra MD   2/28/2022

## 2022-02-28 NOTE — PROGRESS NOTES
INPATIENT CARDIOLOGY Follow UP    Name: Sandra Collins    Age: 73 y.o.     Date of Admission: 2/25/2022  1:08 AM    Date of Service: 2/28/2022      Referring Physician: Benjamin Frost MD      Subjective: Underwent CLEMENCIA today to rule out endocarditis which revealed no evidence of endocarditis          Past Medical History:  Past Medical History:   Diagnosis Date    Abnormal EKG     left bundle branch block    Acute gout involving toe of right foot 09/03/2020    Acute on chronic combined systolic (congestive) and diastolic (congestive) heart failure ([address]     Cancer ([address]     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    Cerebrovascular disease     Clotting disorder ([address] 1985    thrombophlebitis after c section delivery    Depression     15 years followed by dr Claudell Gilmore Hyperlipidemia     Hypertension     Hypothyroidism     Leg swelling 09/03/2020    Lymphedema of both lower extremities 09/03/2020    Peripheral vascular disease ([address]     Renal failure 11/2012    renal transplant    Thrombophlebitis     after c section delivery    Thyroid disease        Past Surgical History:  Past Surgical History:   Procedure Laterality Date   [address]    one normal delivery    COLECTOMY N/A 1/15/2022    DIAGNOSTIC  LAPAROSCOPY LYSIS OF ADHESIONS performed by Vincent Jose MD at [address] [facility] CATH LAB PROCEDURE  2006    normal coronaries and 1996 normal coronaries    DIALYSIS FISTULA CREATION  march and july 2012    phase one and two patient will start dialysis when needed   [address]    transfemoral venous bypass grafts    IR IVC FILTER PLACEMENT W IMAGING  2/26/2022    IR IVC FILTER PLACEMENT W IMAGING 2/26/2022 [id number]ED[facility]    KIDNEY TRANSPLANT  2016    KIDNEY TRANSPLANT  2015    KIDNEY TRANSPLANT  2015    PARATHYROIDECTOMY  2006    left parathyroid  implant and parathyroidectomy       Family History:  Family History   Problem Relation Age of Onset    Diabetes Mother     Diabetes Father     Dementia Father        Social History:  Social History     Socioeconomic History    Marital status:      Spouse name: Not on file    Number of children: Not on file    Years of education: Not on file    Highest education level: Not on file   Occupational History    Not on file   Tobacco Use    Smoking status: Former Smoker     Packs/day: 1.00     Years: 20.00     Pack years: 20.00     Quit date: 10/1/2015     Years since quittin.4    Smokeless tobacco: Never Used   Vaping Use    Vaping Use: Never used   Substance and Sexual Activity    Alcohol use: No     Comment: 2 cups coffee per day    Drug use: No    Sexual activity: Not on file   Other Topics Concern    Not on file   Social History Narrative    Not on file     Social Determinants of Health     Financial Resource Strain:     Difficulty of Paying Living Expenses: Not on file   Food Insecurity:     Worried About [address] in the Last Year: Not on file    [place] of Food in the Last Year: Not on file   Transportation Needs:     Lack of Transportation (Medical): Not on file    Lack of Transportation (Non-Medical):  Not on file   Physical Activity:     Days of Exercise per Week: Not on file    Minutes of Exercise per Session: Not on file   Stress:     Feeling of Stress : Not on file   Social Connections:     Frequency of Communication with Friends and Family: Not on file    Frequency of Social Gatherings with Friends and Family: Not on file    Attends Gnosticism Services: Not on file    Active Member of Clubs or Organizations: Not on file    Attends Club or Organization Meetings: Not on file    Marital Status: Not on file   Intimate Partner Violence:     Fear of Current or Ex-Partner: Not on file    Emotionally Abused: Not on file    Physically Abused: Not on file    Sexually Abused: Not on file   Housing Stability:     Unable to Pay for Housing in the Last Year: Not on file    Number of Places Lived in the Last Year: Not on file    Unstable Housing in the Last Year: Not on file       Allergies: Allergies   Allergen Reactions    Macrodantin [Nitrofurantoin Macrocrystal]     Sulfa Antibiotics        Home Medications:  Prior to Admission medications    Medication Sig Start Date End Date Taking?  Authorizing Provider   apixaban (ELIQUIS) 5 MG TABS tablet Take 5 mg by mouth 2 times daily Take in the morning and at bedtime for 3 months (started 2/19/2022)   Yes Historical Provider, MD   atorvastatin (LIPITOR) 10 MG tablet Take 10 mg by mouth daily   Yes Historical Provider, MD   furosemide (LASIX) 20 MG tablet  12/22/21  Yes Historical Provider, MD   lisinopril (PRINIVIL;ZESTRIL) 5 MG tablet take 1 tablet by mouth once daily 12/8/21  Yes Historical Provider, MD   pregabalin (LYRICA) 75 MG capsule  12/26/21  Yes Historical Provider, MD   allopurinol (ZYLOPRIM) 100 MG tablet Take 100 mg by mouth daily   Yes Historical Provider, MD   metoprolol succinate (TOPROL XL) 50 MG extended release tablet take 1 tablet by mouth once daily 4/17/20  Yes Gloria Garcia MD   desvenlafaxine succinate (PRISTIQ) 50 MG TB24 extended release tablet Take 1 tablet by mouth every evening 12/20/19  Yes Zara Nugent MD   cloZAPine (CLOZARIL) 50 MG tablet Take 50 mg by mouth nightly   Yes Historical Provider, MD   cycloSPORINE (SANDIMMUNE) 100 MG capsule Take 100 mg by mouth 2 times daily   Yes Historical Provider, MD   predniSONE (DELTASONE) 5 MG tablet Take 5 mg by mouth daily   Yes Historical Provider, MD   folic acid (FOLVITE) 1 MG tablet Take 1 mg by mouth daily   Yes Historical Provider, MD   docusate sodium (COLACE) 100 MG capsule Take 100 mg by mouth 2 times daily   Yes Historical Provider, MD   mycophenolate (CELLCEPT) 500 MG tablet Take 1,000 mg by mouth 2 times daily   Yes Historical Provider, MD   levothyroxine (SYNTHROID) 50 MCG tablet Take 50 mcg by mouth daily. Yes Historical Provider, MD   nystatin (MYCOSTATIN) 203308 UNIT/GM powder Apply 3 times daily. 12/27/21   KATINA Camacho - CNP   ipratropium-albuterol (DUONEB) 0.5-2.5 (3) MG/3ML SOLN nebulizer solution Inhale 3 mLs into the lungs every 6 hours as needed for Shortness of Breath 11/27/21   Susannah Ramirez MD   budesonide (PULMICORT) 0.5 MG/2ML nebulizer suspension Take 2 mLs by nebulization 2 times daily for 10 days 11/27/21 12/7/21  William Hernandez MD   ondansetron (ZOFRAN-ODT) 4 MG disintegrating tablet Take 1 tablet by mouth every 8 hours as needed for Nausea or Vomiting 11/27/21   Quiana Ramirez MD   Cyanocobalamin (B-12 COMPLIANCE INJECTION) 1000 MCG/ML KIT Inject as directed monthly    Historical Provider, MD   simvastatin (ZOCOR) 20 MG tablet Take 20 mg by mouth nightly.       Historical Provider, MD       Current Medications:  Current Facility-Administered Medications   Medication Dose Route Frequency Provider Last Rate Last Admin    sodium chloride flush 0.9 % injection 5-40 mL  5-40 mL IntraVENous 2 times per day Salinas Gazella, DO        sodium chloride flush 0.9 % injection 5-40 mL  5-40 mL IntraVENous PRN Jeovany Raspovic, DO        0.9 % sodium chloride infusion  25 mL IntraVENous PRN Jeovany Raspovic, DO        heparin flush 100 UNIT/ML injection 100 Units  1 mL IntraVENous 2 times per day Salinas Gazella, DO        heparin flush 100 UNIT/ML injection 100 Units  1 mL IntraCATHeter PRN Ejovany Raspovic, DO        lidocaine PF 1 % injection 5 mL  5 mL IntraDERmal Once Salinas Gazella, DO        sodium chloride flush 0.9 % injection 5-40 mL  5-40 mL IntraVENous 2 times per day Salinas Gazella, DO        sodium chloride flush 0.9 % injection 5-40 mL  5-40 mL IntraVENous PRN Jeovany Raspovic, DO        0.9 % sodium chloride infusion  25 mL IntraVENous PRN Jeovany Raspovic, DO        heparin flush 100 UNIT/ML injection 300 Units  3 mL IntraVENous 2 times per day Jeovany Kennethpovic, DO        heparin flush 100 UNIT/ML injection 300 Units  3 mL IntraCATHeter PRN Jeovany Raspovic, DO        sodium chloride flush 0.9 % injection 5-40 mL  5-40 mL IntraVENous 2 times per day Jarome Dach, APRN - CNP   10 mL at 02/27/22 2146    sodium chloride flush 0.9 % injection 5-40 mL  5-40 mL IntraVENous PRN Jarome Dach, APRN - CNP   10 mL at 02/27/22 2205    0.9 % sodium chloride infusion  25 mL IntraVENous PRN Jarome Dach, APRN - CNP        heparin flush 100 UNIT/ML injection 100 Units  1 mL IntraVENous 2 times per day Jarome Dach, APRN - CNP        heparin flush 100 UNIT/ML injection 100 Units  1 mL IntraCATHeter PRN Jarome Dach, APRN - CNP        pantoprazole (PROTONIX) injection 40 mg  40 mg IntraVENous BID Denystrip Mueller, DO   40 mg at 02/28/22 1351    0.9 % sodium chloride infusion   IntraVENous Continuous Jarome Dach, APRN - CNP   Stopped at 02/28/22 1028    sodium chloride flush 0.9 % injection 5-40 mL  5-40 mL IntraVENous 2 times per day Bautista Issa MD   10 mL at 02/27/22 2206    sodium chloride flush 0.9 % injection 5-40 mL  5-40 mL IntraVENous PRN Susannah Ramirez MD        0.9 % sodium chloride infusion  25 mL IntraVENous PRN Bautista Issa MD        ondansetron (ZOFRAN-ODT) disintegrating tablet 4 mg  4 mg Oral Q8H PRN Claudette Ramirez MD        Or    [facility]ron [facility]) injection 4 mg  4 mg IntraVENous Q6H PRN Susannah Ramirez MD        polyethylene glycol (GLYCOLAX) packet 17 g  17 g Oral Daily PRN Bautista Issa MD        acetaminophen (TYLENOL) tablet 650 mg  650 mg Oral Q6H PRN Bautista Issa MD   650 mg at 02/27/22 6427    Or    acetaminophen (TYLENOL) suppository 650 mg  650 mg Rectal Q6H PRN Bautista Issa MD        ipratropium-albuterol (DUONEB) nebulizer solution 1 ampule  1 ampule Inhalation 4x daily Bautista Issa MD   1 ampule at 02/28/22 0947    allopurinol (ZYLOPRIM) tablet 100 mg  100 mg Oral Daily Susannah Ramirez, MD   100 mg at 02/28/22 1353    budesonide (PULMICORT) nebulizer suspension 500 mcg  500 mcg Nebulization BID Bryn Richards MD   500 mcg at 02/28/22 4520    cloZAPine (CLOZARIL) tablet 50 mg  50 mg Oral Nightly Bryn Richards MD   50 mg at 02/27/22 2147    cycloSPORINE (SANDIMMUNE) capsule 100 mg  100 mg Oral BID Bryn Richards MD   100 mg at 02/28/22 1352    desvenlafaxine succinate (PRISTIQ) extended release tablet 50 mg  50 mg Oral QPM Bryn Richards MD   50 mg at 02/27/22 1802    docusate sodium (COLACE) capsule 100 mg  100 mg Oral BID Bryn Richards MD   100 mg at 24/27/84 8235    folic acid (FOLVITE) tablet 1 mg  1 mg Oral Daily Pancho Ramirez MD   1 mg at 02/28/22 1353    levothyroxine (SYNTHROID) tablet 50 mcg  50 mcg Oral Daily Bryn Richards MD   50 mcg at 02/28/22 0547    predniSONE (DELTASONE) tablet 5 mg  5 mg Oral Daily Pancho Ramirez MD   5 mg at 02/28/22 1353    mycophenolate (CELLCEPT) capsule 1,000 mg  1,000 mg Oral BID Bryn Richards MD   1,000 mg at 02/28/22 1352    piperacillin-tazobactam (ZOSYN) 3,375 mg in dextrose 5 % 50 mL IVPB extended infusion (mini-bag)  3,375 mg IntraVENous Q8H Jaclyn Desouza MD   Stopped at 02/28/22 0912    0.9 % sodium chloride bolus  25 mL IntraVENous Matthew Ruff MD   Stopped at 02/28/22 0438      sodium chloride      sodium chloride      sodium chloride      sodium chloride Stopped (02/28/22 1028)    sodium chloride         Physical Exam:  BP (!) 152/70   Pulse 92   Temp 96 °F (35.6 °C) (Infrared)   Resp 20   Ht 5' 2\" (1.575 m)   Wt 220 lb (99.8 kg)   SpO2 98%   BMI 40.24 kg/m²   Wt Readings from Last 3 Encounters:   02/25/22 220 lb (99.8 kg)   01/19/22 216 lb (98 kg)   11/17/21 227 lb 3.2 oz (103.1 kg)       Appearance: Alert and oriented x3 not in acute distress.   Skin: Dry skin  Head: Atraumatic  Eyes: Intact extraocular muscles   ENMT: Mucous membranes are moist  Neck: Supple  Lungs: Clear to auscultation  Cardiac: Normal S1 and S2  Abdomen: Protuberant  Extremities: Intact range of motion  Neurologic: No focal neurological deficits  Peripheral Pulses: 2+ peripheral pulses    Intake/Output:    Intake/Output Summary (Last 24 hours) at 2/28/2022 1802  Last data filed at 2/28/2022 1505  Gross per 24 hour   Intake 1880.72 ml   Output 500 ml   Net 1380.72 ml     I/O this shift:  In: 1087.7 [P.O.:300; I.V.:746.7; IV Piggyback:41.1]  Out: 200 [Urine:200]    Laboratory Tests:  Recent Labs     02/26/22  0454 02/27/22  0344 02/28/22  1350    139 138   K 4.7 4.0 4.3    106 106   CO2 27 21* 23   BUN 30* 31* 25*   CREATININE 1.6* 1.7* 1.6*   GLUCOSE 106* 100* 81   CALCIUM 9.5 9.1 9.6     Lab Results   Component Value Date    MG 2.1 02/28/2022    MG 1.5 02/27/2022    MG 2.1 01/24/2022     Recent Labs     02/26/22  0454 02/27/22  0344 02/28/22  1350   ALKPHOS 74 72 75   ALT 9 6 8   AST 29 11 12   PROT 5.2* 5.0* 5.2*   BILITOT 0.7 0.7 0.6   LABALBU 2.6* 2.2* 2.5*     Recent Labs     02/26/22  0454 02/26/22  0454 02/27/22  0344 02/27/22  0953 02/27/22  1602 02/27/22  1602 02/27/22  2241 02/28/22  0357 02/28/22  1350   WBC 6.4  --  5.6  --   --   --   --   --  5.5   RBC 2.45*  --  2.22*  --   --   --   --   --  2.81*   HGB 7.9*   < > 7.1*   < > 8.4*   < > 8.7* 9.0* 9.0*   HCT 27.6*   < > 25.5*  --  28.6*  --   --   --  30.8*   .7*  --  114.9*  --   --   --   --   --  109.6*   MCH 32.2  --  32.0  --   --   --   --   --  32.0   MCHC 28.6*  --  27.8*  --   --   --   --   --  29.2*   RDW 17.1*  --  17.2*  --   --   --   --   --  17.0*     --  132  --   --   --   --   --  127*   MPV 12.8*  --  13.0*  --   --   --   --   --  13.3*    < > = values in this interval not displayed. Lab Results   Component Value Date    CKTOTAL 21 02/25/2022     No results for input(s): CKTOTAL, CKMB, CKMBINDEX, TROPHS in the last 72 hours.   Lab Results   Component Value Date    INR 1.1 01/23/2022    INR 1.0 11/20/2021 INR 1.1 11/17/2021    PROTIME 13.0 (H) 01/23/2022    PROTIME 11.5 11/20/2021    PROTIME 12.9 (H) 11/17/2021     Lab Results   Component Value Date    TSH 0.376 11/17/2021    TSH 0.807 12/22/2019     Lab Results   Component Value Date    LABA1C 5.7 (H) 11/17/2021     No results found for: EAG  No results found for: CHOL  No results found for: TRIG  No results found for: HDL  No results found for: LDLCALC, LDLCHOLESTEROL  No results found for: LABVLDL, VLDL  No results found for: CHOLHDLRATIO  No results for input(s): PROBNP in the last 72 hours. Cardiac Tests:  EKG reviewed (EKG date: EKG shows sinus rhythm, left bundle branch block, rate of 98 beats per):        Echocardiogram reviewed: January 2022  Mild concentric left ventricular hypertrophy. There is doppler evidence of stage I diastolic dysfunction. Ejection fraction is visually estimated at 60 to 65%. Normal right ventricular size and function. Mild tricuspid regurgitation. Signature    Stress test reviewed:      Cardiac catheterization reviewed:     CXR reviewed: The ASCVD Risk score (Kedar Mock., et al., 2013) failed to calculate for the following reasons:    Cannot find a previous HDL lab    Cannot find a previous total cholesterol lab    ASSESSMENT / PLAN:    1. Acute respiratory failure with hypoxia (HCC)  Management per pulmonary and critical care  2. Colitis and anemia now being evaluated for EGD and cardiology called for preoperative cardiovascular evaluation  From cardiovascular perspective patient may proceed to the plan GI procedure without additional cardiac testing  Please check with pulmonology prior to the procedure given patient's pulmonary status  Call cardiology if there are concerns prior to, during or after the procedure  Cardiology will monitor closely and follow patient    3. Dehydration  Management per primary service  4.  Elevated troponin  Likely demand ischemia in the setting of anemia  Proceed with GI evaluation  Rest of cardiac evaluation will be discussed when patient is stable and discharged in the outpatient    5. Paroxysmal atrial fibrillation  Not on anticoagulation due to bleeding/anemia  Resume rate control therapy when blood pressure is stable   Consider watchman device evaluation in the outpatient when discharged  6. DVT  Has been evaluated for IVC filter in the setting of anemia and GI bleed and not able to tolerate anticoagulation    7. COPD    8. Sleep apnea    9. Chronic kidney disease  Monitor BUN/creatinine closely and avoid nephrotoxic agents    10. Bacteremia/positive blood culture  CLEMENCIA today revealed no evidence of endocarditis            Thank you for allowing me to participate in your patient's care. Please feel free to contact me if you have any questions or concerns.     Sebastian Jensen MD  [facility]) Cardiology

## 2022-02-28 NOTE — PROGRESS NOTES
[facility]    [address] [place] [address] 80  [facility], [address] [place] [address]  Phone [phone]   [phone]      Admit Date: 2022  1:08 AM  Pt Name: No Cottrell  MRN: [id number]  : 1955  Reason for Consult:    Chief Complaint   Patient presents with    Altered Mental Status     hx of bipolar, Hx liver failure/renal failure, No history of AMS, no HX of dialysis, was hypoxic at [facility] 76% on room air was placed on 3L NC 93%. Requesting Physician:  Salinas Gonzalez DO  PCP: Ilda Gagnon MD  History Obtained From:  patient, chart   ID consulted for Dehydration [E86.0]  Colitis [K52.9]  Hypoxia [R09.02]  Elevated troponin [R77.8]  Acute respiratory failure with hypoxia (HCC) [J96.01]  Altered mental status, unspecified altered mental status type [R41.82]  on hospital day 3   Face to face encounter   [facility]       Chief Complaint   Patient presents with    Altered Mental Status     hx of bipolar, Hx liver failure/renal failure, No history of AMS, no HX of dialysis, was hypoxic at [facility] 76% on room air was placed on 3L NC 93%. HISTORYOF PRESENT ILLNESS   No Cottrell is a 73 y.o. female who presents with   has a past medical history of Abnormal EKG, Acute gout involving toe of right foot, Acute on chronic combined systolic (congestive) and diastolic (congestive) heart failure ([address] Cancer ([address] Cerebrovascular disease, Clotting disorder ([address] Depression, Hyperlipidemia, Hypertension, Hypothyroidism, Leg swelling, Lymphedema of both lower extremities, Peripheral vascular disease ([address] Renal failure, Thrombophlebitis, and Thyroid disease. HPI   Patient was admitted to hospital from nursing facility- she was hypoxic and brought to the ER for further evaluation. She is an immunocompromised patient - with history of renal transplant.    She is currently on Bipap- lethargic- receiving blood- all information obtained from review of records. She has been afebrile. She had a blood culture turn for Staph Epi   She is currently on Zosyn      Patient is known to ID service- [id number]/[id number]- she had a complicated hospital stay at that time - she is S/P COVID   She was treated for colitis- she had SBO. Her fungitell was positive- she was on diflucan  She had a UTI- was also treated for HCAP. Patient was then transferred to [facility] on 1/25/2022    ID has been asked to evaluate and manage antibiotics.      DOS  2/28/2022  PT IS AWAKE AND ALERT NAD    AFEBRILE 2L  CR1.6  WBC5.5  BLOOD CX CONS  URINE CX NGTD    REVIEW OF SYSTEMS     CONSTITUTIONAL:   No fever, chills, weight loss  ALLERGIES:    No urticaria, hay fever,    EYES:     No blurry vision, loss of vision, eye pain  ENT:      No hearing loss, sore throat  CARDIOVASCULAR:   + HTN, + hyperlipidemia   RESPIRATORY:   No cough, sob  ENDOCRINE:    No increase thirst, urination , + thyroid disease   HEME-LYMPH:   No easy bruising or bleeding  GI:     No nausea, vomiting or diarrhea  :     No urinary complaints  NEURO:    No seizures, history of stroke, HA  MUSCULOSKELETAL:  No muscle aches or pain, no joint pain  SKIN:     No rash or itch  PSYCH:    + depression or anxiety     CURRENT MEDICATIONS     Current Facility-Administered Medications:     lidocaine 1 % injection 5 mL, 5 mL, IntraDERmal, Once, Alice Dillon, DO    sodium chloride flush 0.9 % injection 5-40 mL, 5-40 mL, IntraVENous, 2 times per day, Jeovany Hankins, DO    sodium chloride flush 0.9 % injection 5-40 mL, 5-40 mL, IntraVENous, PRN, Asael AshleyUsmanlinda Hankins, DO    0.9 % sodium chloride infusion, 25 mL, IntraVENous, PRN, Asael AshleyShrewsburylinda Hankins, DO    heparin flush 100 UNIT/ML injection 100 Units, 1 mL, IntraVENous, 2 times per day, Jeovany Hankins, DO    heparin flush 100 UNIT/ML injection 100 Units, 1 mL, IntraCATHeter, PRN, Jeovany Hankins, DO    lidocaine 1 % injection 5 mL, 5 mL, IntraDERmal, Once, Loraine Hall Krish Falk, APRN - CNP    sodium chloride flush 0.9 % injection 5-40 mL, 5-40 mL, IntraVENous, 2 times per day, KATINA Jackson CNP, 10 mL at 02/27/22 2146    sodium chloride flush 0.9 % injection 5-40 mL, 5-40 mL, IntraVENous, PRN, KATINA Jackson CNP, 10 mL at 02/27/22 2205    0.9 % sodium chloride infusion, 25 mL, IntraVENous, PRN, KATINA Jackson CNP    heparin flush 100 UNIT/ML injection 100 Units, 1 mL, IntraVENous, 2 times per day, KATINA Jackson CNP    heparin flush 100 UNIT/ML injection 100 Units, 1 mL, IntraCATHeter, PRN, KATINA Jackson CNP    pantoprazole (PROTONIX) injection 40 mg, 40 mg, IntraVENous, BID, Gómez Gramajo DO, 40 mg at 02/28/22 1351    0.9 % sodium chloride infusion, , IntraVENous, Continuous, KATINA Jackson CNP, Stopped at 02/28/22 1028    sodium chloride flush 0.9 % injection 5-40 mL, 5-40 mL, IntraVENous, 2 times per day, Chase Jones MD, 10 mL at 02/27/22 2206    sodium chloride flush 0.9 % injection 5-40 mL, 5-40 mL, IntraVENous, PRN, Venus Ramirez MD    0.9 % sodium chloride infusion, 25 mL, IntraVENous, PRN, Venus Ramirez MD    ondansetron (ZOFRAN-ODT) disintegrating tablet 4 mg, 4 mg, Oral, Q8H PRN **OR** ondansetron (ZOFRAN) injection 4 mg, 4 mg, IntraVENous, Q6H PRN, Venus Ramirez MD    polyethylene glycol (GLYCOLAX) packet 17 g, 17 g, Oral, Daily PRN, Chase Jones MD    acetaminophen (TYLENOL) tablet 650 mg, 650 mg, Oral, Q6H PRN, 650 mg at 02/27/22 0841 **OR** acetaminophen (TYLENOL) suppository 650 mg, 650 mg, Rectal, Q6H PRN, Venus Ramirez MD    ipratropium-albuterol (DUONEB) nebulizer solution 1 ampule, 1 ampule, Inhalation, 4x daily, Chase Jones MD, 1 ampule at 02/28/22 9994    allopurinol (ZYLOPRIM) tablet 100 mg, 100 mg, Oral, Daily, Susannah Ramirez MD, 100 mg at 02/28/22 1353    budesonide (PULMICORT) nebulizer suspension 500 mcg, 500 mcg, Nebulization, BID, Venus Ramirez MD, 500 mcg at 02/28/22 0626    cloZAPine (CLOZARIL) tablet 50 mg, 50 mg, Oral, Nightly, Venus Ramirez MD, 50 mg at 02/27/22 2147    cycloSPORINE (SANDIMMUNE) capsule 100 mg, 100 mg, Oral, BID, Chase Jones MD, 100 mg at 02/28/22 1352    desvenlafaxine succinate (PRISTIQ) extended release tablet 50 mg, 50 mg, Oral, QPM, Venus Ramirez MD, 50 mg at 02/27/22 1802    docusate sodium (COLACE) capsule 100 mg, 100 mg, Oral, BID, Chase Jones MD, 100 mg at 46/31/08 5469    folic acid (FOLVITE) tablet 1 mg, 1 mg, Oral, Daily, Chase Jones MD, 1 mg at 02/28/22 1353    levothyroxine (SYNTHROID) tablet 50 mcg, 50 mcg, Oral, Daily, Chase Jones MD, 50 mcg at 02/28/22 0547    predniSONE (DELTASONE) tablet 5 mg, 5 mg, Oral, Daily, Chase Jones MD, 5 mg at 02/28/22 1353    mycophenolate (CELLCEPT) capsule 1,000 mg, 1,000 mg, Oral, BID, Venus Ramirez MD, 1,000 mg at 02/28/22 1352    piperacillin-tazobactam (ZOSYN) 3,375 mg in dextrose 5 % 50 mL IVPB extended infusion (mini-bag), 3,375 mg, IntraVENous, Q8H, Nuzhta Wong MD, Stopped at 02/28/22 0912    0.9 % sodium chloride bolus, 25 mL, IntraVENous, Q8H, Nuzhat Wong MD, Stopped at 02/28/22 8632  ALLERGIES     Macrodantin [nitrofurantoin macrocrystal] and Sulfa antibiotics  Immunization History   Administered Date(s) Administered    COVID-19, Pfizer Purple top, DILUTE for use, 12+ yrs, 30mcg/0.3mL dose 01/28/2021, 02/18/2021, 08/18/2021      Internal Administration   First Dose COVID-19, Pfizer Purple top, DILUTE for use, 12+ yrs, 30mcg/0.3mL dose  01/28/2021   Second Dose COVID-19, Pfizer Purple top, DILUTE for use, 12+ yrs, 30mcg/0.3mL dose   02/18/2021       Last COVID Lab SARS-CoV-2 (no units)   Date Value   01/20/2021 Not Detected     SARS-CoV-2 Antibody, Total (no units)   Date Value   01/13/2022 Non-Reactive     SARS-CoV-2, PCR (no units)   Date Value   02/25/2022 Not Detected     SARS-CoV-2, NAAT (no units)   Date Value 2022 Not Detected        PAST MEDICAL HISTORY     Past Medical History:   Diagnosis Date    Abnormal EKG     left bundle branch block    Acute gout involving toe of right foot 2020    Acute on chronic combined systolic (congestive) and diastolic (congestive) heart failure (HCC)     Cancer (HCC)     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    Cerebrovascular disease     [address]otting disorder ([facility] 1985    thrombophlebitis after c section delivery    Depression     15 years followed by dr Rupert Lara Hyperlipidemia     Hypertension     Hypothyroidism     Leg swelling 2020    Lymphedema of both lower extremities 2020    Peripheral vascular disease ([facility]     Renal failure 2012    renal transplant    Thrombophlebitis     after c section delivery    Thyroid disease      SURGICAL HISTORY       Past Surgical History:   Procedure Laterality Date     SECTION  1985    one normal delivery    COLECTOMY N/A 1/15/2022    DIAGNOSTIC  LAPAROSCOPY LYSIS OF ADHESIONS performed by Nilda Harry MD at [address] CATH LAB PROCEDURE      normal coronaries and  normal coronaries    DIALYSIS FISTULA CREATION  march and 2012    phase one and two patient will start dialysis when needed   [address]    transfemoral venous bypass grafts    IR IVC FILTER PLACEMENT W IMAGING  2022    IR IVC FILTER PLACEMENT W IMAGING 2022 [facility]ED[facility]    KIDNEY TRANSPLANT  2016    KIDNEY TRANSPLANT  2015    KIDNEY TRANSPLANT  2015    PARATHYROIDECTOMY  2006    left parathyroid  implant and parathyroidectomy     ·   From nursing facility     PHYSICAL EXAM          Vitals:    22 1243 22 1244 22 1245 22 1300   BP:    (!) 152/70   Pulse:    92   Resp:    20   Temp:    96 °F (35.6 °C)   TempSrc:    Infrared   SpO2: 99% 98% 98% 98%   Weight:       Height:         Physical Exam Constitutional/General: Lethargic in bed- on bipap  Head: NC/AT  Eyes: PERRL, EOMI  Pulmonary: Lungs diminished to bases. ON RA   Cardiovascular:  Regular rate and rhythm- distant. Abdomen: Soft, + BS. No distension. Nontender. Extremities: Warm and well perfused, + edema   Left arm with fistula-   Skin: Warm and dry wit  Gluteal area with small open areas noted , excoriation   Jania-rectal area with discoloration/ ? Wound/ wart/ dark area. Neurologic:    No focal deficits  Psych: Normal Affect  Chin   PIV     DIAGNOSTIC RESULTS   RADIOLOGY:   CT HEAD WO CONTRAST    Result Date: 2/25/2022  EXAMINATION: CT OF THE HEAD WITHOUT CONTRAST  2/25/2022 3:08 am TECHNIQUE: CT of the head was performed without the administration of intravenous contrast. Dose modulation, iterative reconstruction, and/or weight based adjustment of the mA/kV was utilized to reduce the radiation dose to as low as reasonably achievable. COMPARISON: Correlation with MRI dated 01/16/2022 HISTORY: ORDERING SYSTEM PROVIDED HISTORY: mental status change TECHNOLOGIST PROVIDED HISTORY: Has a \"code stroke\" or \"stroke alert\" been called? ->No Reason for exam:->mental status change Decision Support Exception - unselect if not a suspected or confirmed emergency medical condition->Emergency Medical Condition (MA) FINDINGS: BRAIN/VENTRICLES: There is no acute intracranial hemorrhage, mass effect or midline shift. No abnormal extra-axial fluid collection. The gray-white differentiation is maintained without evidence of an acute infarct. There is no evidence of hydrocephalus. Minimal cortical volume loss. Scattered vascular calcifications. ORBITS: The visualized portion of the orbits demonstrate no acute abnormality. SINUSES: Significant opacification of the left greater than right mastoid air cells similar to previous. Mild mucosal thickening in the left sphenoid sinus. This was also present previously.  SOFT TISSUES/SKULL:  No acute abnormality of the visualized skull or soft tissues. No acute intracranial abnormality. MRI would be useful if symptoms persist. Inflammatory changes of left greater than right mastoid air cells and left sphenoid sinus similar to prior MRI. RECOMMENDATIONS: Unavailable     NM LUNG VENT/PERFUSION (VQ)    Result Date: 2/25/2022  EXAMINATION: NUCLEAR MEDICINE VENTILATION PERFUSION SCAN. 2/25/2022 TECHNIQUE: 3.5 millicuries aerosolized Tc99m DTPA was administered via mask prior to planar imaging of the lungs in multiple projections. Then, 4.2 millicuries of Tc 41T MAA was administered intravenously prior to planar imaging of the lungs in similar projections. COMPARISON: Chest CT February 25, 2022 . HISTORY: ORDERING SYSTEM PROVIDED HISTORY: R/O PE TECHNOLOGIST PROVIDED HISTORY: Reason for exam:->R/O PE What reading provider will be dictating this exam?->CRC FINDINGS: PERFUSION: Distribution of radiotracer is homogeneous. No segmental defects identified. VENTILATION: Ventilation images are unremarkable. CHEST CT: Small infiltrate or atelectasis posterior right lung. Negative for pulmonary embolus. CT ABDOMEN PELVIS W IV CONTRAST Additional Contrast? None    Result Date: 2/25/2022  EXAMINATION: CT OF THE ABDOMEN AND PELVIS WITH CONTRAST 2/25/2022 3:08 am TECHNIQUE: CT of the abdomen and pelvis was performed with the administration of intravenous contrast. Multiplanar reformatted images are provided for review. Dose modulation, iterative reconstruction, and/or weight based adjustment of the mA/kV was utilized to reduce the radiation dose to as low as reasonably achievable.  COMPARISON: 01/17/2022 HISTORY: ORDERING SYSTEM PROVIDED HISTORY: Abdominal distention, AMS TECHNOLOGIST PROVIDED HISTORY: Reason for exam:->Abdominal distention, AMS Additional Contrast?->None Decision Support Exception - unselect if not a suspected or confirmed emergency medical condition->Emergency Medical Condition (MA) FINDINGS: Many images are partially degraded by patient motion related artifact. Low-attenuation focus in the central aspect of the left lobe of the liver as well as a tiny low-attenuation focus in the caudate lobe. There is also a tiny low-attenuation focus in the extreme inferior aspect of the right lobe. These are difficult to definitively characterize due to their small size but likely represent small hepatic cysts. Gallbladder is unremarkable. Spleen is normal in size. No evidence of acute pancreatitis. There is a E small exophytic cystic appearing lesion along the anterior aspect of the body of the pancreas measuring 7 Hounsfield units and 1.4 cm. This is unchanged dating back to 11/17/2021. This is also favored to be incidental but could be followed. No adrenal mass. No hydronephrosis. The native kidneys remains significantly atrophic and contain numerous cystic lesions. Some of these are hyperdense but also unchanged dating back to the 11/17/2021 exam and favored to represent hyperdense or hemorrhagic cysts. Small renal cortical calcifications and or nonobstructing stones. A right lower quadrant renal transplant demonstrates no hydronephrosis. Small hiatal hernia. Mild fluid distention of a few small bowel loops in the upper abdomen. No bowel obstruction. The cecum is mobile. The appendix is not identified with certainty. There is some localized thickening of the proximal ascending colon. This does appear to be new compared to the previous examination. Moderate stool in the distal colon. Residual contrast in the colon likely due to contrast administered on the prior examination. Uterus is atrophic. Urinary bladder is largely decompressed with Chin catheter. Minimal gas in the bladder likely due to the catheter. Partial visualization of femoral to femoral bypass graft. Degenerative changes are present in the spine and pelvis.  Please see separate dictated report for CT of the chest.     Thickening of the proximal ascending colon. Given the short-term change, this is favored to represent a localized area of colitis likely infectious or inflammatory. Neoplasia thought less likely but follow-up to resolution would be recommended. Moderate stool in the distal colon. Minimal distention of several proximal small bowel loops likely incidental or due to mild enteritis. Small hiatal hernia. Other chronic appearing findings. RECOMMENDATIONS: Unavailable     IR GUIDED IVC FILTER PLACEMENT    Result Date: 2/26/2022  EXAMINATION: INFERIOR VENA CAVA (IVC) FILTER INSERTION 2/26/2022 Procedural Personnel: Sam Dooley MD HISTORY: Indication: Bleeding with IV heparin ORDERING SYSTEM PROVIDED HISTORY: IVC filter TECHNOLOGIST PROVIDED HISTORY: Reason for exam:->IVC filter Anticoagulation contraindicated SEDATION: None CONTRAST: Contrast agent: Isovue 320 Contrast volume: 30mL FLUOROSCOPY DOSE AND TYPE OR TIME AND EXPOSURES: Fluoroscopy time/Number of Images: Fluoroscopy time 1.5 minutes. Reference [facility] kerma: [id number] PROCEDURE: Informed consent for the procedure including risks, benefits and alternatives was obtained and time-out was performed prior to the procedure. Universal protocol was followed. A suitable skin site was prepared and draped using all elements of maximal sterile barrier technique including sterile gloves, sterile gown, cap, mask, large sterile sheet, sterile ultrasound probe cover, hand hygiene, and cutaneous antisepsis. Time-out was called and the patient's order and identity were verified. Local anesthesia was administered. The vessel was sonographically evaluated and judged appropriate for access. Real time ultrasound was used to visualize needle entry into the vessel and an ultrasound image was stored in PACS. Vein accessed: Right common femoral vein. Access technique: Micropuncture set with 21 gauge needle A 0.035 guide wire was used to place a catheter into the inferior vena cava.  A vena cavogram was performed. A Bard Fountain filter was deployed in routine fashion in the infrarenal IVC under fluoroscopic guidance. The sheath was removed and hemostasis was achieved with manual compression. A sterile dressing was applied. Patient tolerated procedure well. Complications: No immediate complications. Standardized report: [id number]_[email or web address] [id number] FINDINGS: US: The access vein is patent. Inferior Vena Cavogram: The vena cava is patent and normal in size without thrombus or anomalies. Post-placement imaging: Spot filmdemonstrates the filter in the infrarenalvena cava with less than 15 degrees tilt from the vena cava access. Successful vena cava filter placement. PLAN: Retrieval intent (MIPS): The filter is potentially retrievable. Plan/Timing For Retrieval: Ordering Physician/Contact For Retrieval Plan: [facility]     XR CHEST 1 VIEW    Result Date: 2/25/2022  EXAMINATION: ONE XRAY VIEW OF THE CHEST 2/25/2022 2:49 pm COMPARISON: None. HISTORY: ORDERING SYSTEM PROVIDED HISTORY: needs to be done with VQ scan TECHNOLOGIST PROVIDED HISTORY: Last chest Xray done more than 24 hours ago Reason for exam:->needs to be done with VQ scan FINDINGS: The heart is mildly enlarged. There are no findings of failure. The lungs are clear. There is no focal infiltrate or effusion. .     1. Mild cardiomegaly. There is no evidence of failure or pneumonia. CTA PULMONARY W CONTRAST    Result Date: 2/25/2022  EXAMINATION: CTA OF THE CHEST 2/25/2022 3:08 am TECHNIQUE: CTA of the chest was performed after the administration of intravenous contrast.  Multiplanar reformatted images are provided for review. MIP images are provided for review. Dose modulation, iterative reconstruction, and/or weight based adjustment of the mA/kV was utilized to reduce the radiation dose to as low as reasonably achievable.  COMPARISON: Portable chest dated 01/22/2022 and CT dated 01/17/2022 HISTORY: [address] [place] [address] HISTORY: Hypoxic, SOB TECHNOLOGIST PROVIDED HISTORY: Reason for exam:->Hypoxic, SOB Decision Support Exception - unselect if not a suspected or confirmed emergency medical condition->Emergency Medical Condition (MA) FINDINGS: Pulmonary Arteries: Assessment is limited due to patient motion artifact. A small or peripheral embolism would be difficult to exclude but no large or central embolism identified. Mediastinum: Moderately severe cardiomegaly is similar to previous. No pericardial effusion. No aortic dissection. Scattered vascular calcifications. Normal-size lymph nodes without adenopathy by size criteria. Lungs/pleura: No pneumothorax. Mild atelectasis in the right greater than left lung base. Otherwise, there has been significant improvement in aeration of the lungs since previous. Upper Abdomen: Partial visualization of the upper right kidney with cystic appearing foci as well as a partially visualized hyperdense lesion likely representing a hyperdense proteinaceous cyst and similar to previous but continued follow-up would be useful. Visualized portions of the upper abdomen demonstrate no acute abnormality. Small hiatal hernia. Soft Tissues/Bones: Degenerative changes are scattered in the spine. Allowing for patient motion artifact, no identified pulmonary embolism. Significant improvement in aeration of the lungs with some residual areas of presumed atelectasis in the right greater than left base. Cardiomegaly.  RECOMMENDATIONS: Unavailable     IR ULTRASOUND GUIDANCE VASCULAR ACCESS    Result Date: 2/26/2022  EXAMINATION: INFERIOR VENA CAVA (IVC) FILTER INSERTION 2/26/2022 Procedural Personnel: Brandon Arnold MD HISTORY: Indication: Bleeding with IV heparin ORDERING SYSTEM PROVIDED HISTORY: IVC filter TECHNOLOGIST PROVIDED HISTORY: Reason for exam:->IVC filter Anticoagulation contraindicated SEDATION: None CONTRAST: Contrast agent: Isovue 320 Contrast volume: 30mL FLUOROSCOPY DOSE AND TYPE OR TIME AND EXPOSURES: Fluoroscopy time/Number of Images: Fluoroscopy time 1.5 minutes. Reference air [place] kerma: [id number] PROCEDURE: Informed consent for the procedure including risks, benefits and alternatives was obtained and time-out was performed prior to the procedure. Universal protocol was followed. A suitable skin site was prepared and draped using all elements of maximal sterile barrier technique including sterile gloves, sterile gown, cap, mask, large sterile sheet, sterile ultrasound probe cover, hand hygiene, and cutaneous antisepsis. Time-out was called and the patient's order and identity were verified. Local anesthesia was administered. The vessel was sonographically evaluated and judged appropriate for access. Real time ultrasound was used to visualize needle entry into the vessel and an ultrasound image was stored in PACS. Vein accessed: Right common femoral vein. Access technique: Micropuncture set with 21 gauge needle A 0.035 guide wire was used to place a catheter into the inferior vena cava. A vena cavogram was performed. A Bard Rosa filter was deployed in routine fashion in the infrarenal IVC under fluoroscopic guidance. The sheath was removed and hemostasis was achieved with manual compression. A sterile dressing was applied. Patient tolerated procedure well. Complications: No immediate complications. Standardized report: [id number]_[email or web address] [id number] FINDINGS: US: The access vein is patent. Inferior Vena Cavogram: The vena cava is patent and normal in size without thrombus or anomalies. Post-placement imaging: Spot filmdemonstrates the filter in the infrarenalvena cava with less than 15 degrees tilt from the vena cava access. Successful vena cava filter placement. PLAN: Retrieval intent (MIPS): The filter is potentially retrievable.  Plan/Timing For Retrieval: Ordering Physician/Contact For Retrieval Plan: Albaro GUERRIER MODIFIED BARIUM SWALLOW W VIDEO    Result Date: 2/26/2022  EXAMINATION: MODIFIED BARIUM SWALLOW WAS PERFORMED IN CONJUNCTION WITH SPEECH PATHOLOGY SERVICES TECHNIQUE: Fluoroscopic evaluation of the swallowing mechanism was performed using cineradiography with multiple consistency of barium product in conjunction with speech pathology services. FLUOROSCOPY DOSE AND TYPE OR TIME AND EXPOSURES: Fluoroscopy time 2.1 minutes. Air kerma 5.49 mGy COMPARISON: None HISTORY: ORDERING SYSTEM PROVIDED HISTORY: dysphagia, aspriation pna? TECHNOLOGIST PROVIDED HISTORY: Reason for exam:->dysphagia, aspriation pna? FINDINGS: There was oral delay and pharyngeal delay. Laryngeal elevation was adequate. There was retention in the piriform sinuses but not the vallecula. Transient laryngeal penetration is seen with thin liquid barium. Otherwise, no aspiration or laryngeal penetration is seen. Strands it laryngeal penetration with thin liquid barium. No evidence of aspiration. Please see separate speech pathology report for full discussion of findings and recommendations. RECOMMENDATIONS: Unavailable     US DUP LOWER EXTREMITIES BILATERAL VENOUS    Result Date: 2/25/2022  EXAMINATION: DUPLEX VENOUS ULTRASOUND OF THE BILATERAL LOWER EXTREMITIES2/25/2022 2:47 pm TECHNIQUE: Duplex ultrasound using B-mode/gray scaled imaging, Doppler spectral analysis and color flow Doppler was obtained of the deep venous structures of the lower bilateral extremities. COMPARISON: None. HISTORY: ORDERING SYSTEM PROVIDED HISTORY: R/O DVT TECHNOLOGIST PROVIDED HISTORY: Reason for exam:->R/O DVT What reading provider will be dictating this exam?->CRC FINDINGS: Right lower extremity:  The visualized veins of the bilateral lower extremities are patent and free of echogenic thrombus. The veins demonstrate good compressibility with normal color flow study and spectral analysis.  Left lower extremity: There are incompletely occlusive thrombi within the mid and distal left femoral vein and the popliteal vein.  They have developed in the interim since the prior lower extremity Doppler from 11/17/2021.     1. INCOMPLETELY OCCLUSIVE THROMBI in the mid and distal LEFT femoral vein and within the popliteal vein. They have developed in the interim since the previous study from 11/17/2021. 2. No evidence of DVT in the right lower extremity. RECOMMENDATIONS: Unavailable     LABS  Recent Labs     02/26/22 0454 02/26/22 0454 02/27/22  0344 02/27/22  0953 02/27/22  1602 02/27/22  1602 02/27/22  2241 02/28/22  0357 02/28/22  1350   WBC 6.4  --  5.6  --   --   --   --   --  5.5   HGB 7.9*   < > 7.1*   < > 8.4*   < > 8.7* 9.0* 9.0*   HCT 27.6*   < > 25.5*  --  28.6*  --   --   --  30.8*   .7*  --  114.9*  --   --   --   --   --  109.6*     --  132  --   --   --   --   --  127*    < > = values in this interval not displayed. Recent Labs     02/26/22 0454 02/26/22 0454 02/27/22 0344 02/27/22 0344 02/28/22  1350     --  139  --  138   K 4.7  --  4.0   < > 4.3      < > 106   < > 106   CO2 27   < > 21*   < > 23   BUN 30*  --  31*  --  25*   CREATININE 1.6*  --  1.7*  --  1.6*   GFRAA 39  --  36  --  39   LABGLOM 32  --  30  --  32   GLUCOSE 106*  --  100*  --  81   PROT 5.2*  --  5.0*  --  5.2*   LABALBU 2.6*  --  2.2*  --  2.5*   CALCIUM 9.5  --  9.1  --  9.6   BILITOT 0.7  --  0.7  --  0.6   ALKPHOS 74  --  72  --  75   AST 29  --  11  --  12   ALT 9  --  6  --  8    < > = values in this interval not displayed.      Recent Labs     02/27/22  1603   PROCAL 0.36*     Lab Results   Component Value Date    CRP 18.3 (H) 01/23/2022    CRP 34.6 (H) 11/17/2021     Lab Results   Component Value Date    SEDRATE 61 (H) 01/23/2022    SEDRATE 62 (H) 11/20/2021     No results found for: [id number]  Lab Results   Component Value Date    COVID19 Not Detected 02/25/2022     COVID-19/MARINA-COV2 LABS  Recent Labs     02/26/22  0454 02/27/22  0344 02/27/22  1603 02/28/22  1350   PROCAL  --   --  0.36*  --    AST 29 11 --  12   ALT 9 6  --  8     MICROBIOLOGY:       Lab Results   Component Value Date    [facility]  02/25/2022     24 Hours no growth  Gram stain performed from blood culture bottle media  Gram positive cocci in clusters      [facility]  02/25/2022     This organism was isolated in one set. Susceptibility testing is not routinely done as this  organism frequently represents skin contamination. Additional testing can be ordered by calling the  Microbiology Department. BC 5 Days no growth 01/23/2022    ORG Staphylococcus coagulase-negative 02/25/2022    ORG Enterococcus faecalis 01/12/2022    ORG Klebsiella pneumoniae ssp pneumoniae 11/17/2021    ORG Staphylococcus coagulase-negative 11/17/2021    ORG Klebsiella pneumoniae ssp pneumoniae 11/17/2021     Lab Results   Component Value Date    BLOODCULT2 24 Hours no growth 02/25/2022    BLOODCULT2 5 Days no growth 01/24/2022    BLOODCULT2 5 Days no growth 01/21/2022    ORG Staphylococcus coagulase-negative 02/25/2022    ORG Enterococcus faecalis 01/12/2022    ORG Klebsiella pneumoniae ssp pneumoniae 11/17/2021    ORG Staphylococcus coagulase-negative 11/17/2021    ORG Klebsiella pneumoniae ssp pneumoniae 11/17/2021     WOUND/ABSCESS   Date Value Ref Range Status   01/24/2022 Growth not present  Final     Urine Culture, Routine   Date Value Ref Range Status   02/26/2022 Growth not present, incubation continues  Preliminary   01/23/2022 Growth not present  Final   01/12/2022 >100,000 CFU/ml  Final     MRSA Culture Only   Date Value Ref Range Status   11/17/2021 Methicillin resistant Staph aureus not isolated  Final     FINAL IMPRESSION    Patient is a 73 y.o. female who presented with   Chief Complaint   Patient presents with    Altered Mental Status     hx of bipolar, Hx liver failure/renal failure, No history of AMS, no HX of dialysis, was hypoxic at [facility] 76% on room air was placed on 3L NC 93%.      and admitted for Dehydration [E86.0]  Colitis [K52.9]  Hypoxia [R09.02]  Elevated troponin [R77.8]  Acute respiratory failure with hypoxia (HCC) [J96.01]  Altered mental status, unspecified altered mental status type [R41.82]   Chart reviewed respiratory failure h/o covid Aspiration pneumonia  cons bacteremia   ckd/kidney transplant on immunosuppressives  Gi seeing for colitis/anemia  Pulm following  Left ue dvt/thrombus  Perianal lesion? Surgery eval ?    · History of vena cava filter and AV fistula   · Klebsiella and CONS bacteremia in 11/17/2021  · Coccyx wound? PERIRECTAL LESION    Plan:    piperacillin-tazobactam (ZOSYN) 3,375 mg in dextrose 5 % 50 mL IVPB extended infusion (mini-bag), Q8H    · Give vancomycin x1   · random level REDOSE IF LESS THEN 20  · Check procal 0.36  · Repeat blood cultures-   · Check CLEMENCIA   · Check fungitell   · General surgery to evaluate mckenzie-rectal area FOLLOWING  · Request records from [address] [place] [address] and labs were reviewed. Thank you for involving me in the care of Pastor Sunshine. Please do not hesitate to call for any questions or concerns.     Electronically signed by Deng Garcia MD on 2/28/2022 at 3:57 PM

## 2022-03-01 ENCOUNTER — APPOINTMENT (OUTPATIENT)
Dept: INTERVENTIONAL RADIOLOGY/VASCULAR | Age: 67
DRG: 177 | End: 2022-03-01
Payer: MEDICARE

## 2022-03-01 LAB
(1,3)-BETA-D-GLUCAN (FUNGITELL) INTERPRETATION: NEGATIVE
(1,3)-BETA-D-GLUCAN (FUNGITELL): <31 PG/ML
ALBUMIN SERPL-MCNC: 2.6 G/DL (ref 3.5–5.2)
ALP BLD-CCNC: 72 U/L (ref 35–104)
ALT SERPL-CCNC: 8 U/L (ref 0–32)
ANION GAP SERPL CALCULATED.3IONS-SCNC: 10 MMOL/L (ref 7–16)
ANISOCYTOSIS: ABNORMAL
AST SERPL-CCNC: 10 U/L (ref 0–31)
BASOPHILS ABSOLUTE: 0.1 E9/L (ref 0–0.2)
BASOPHILS RELATIVE PERCENT: 1.8 % (ref 0–2)
BILIRUB SERPL-MCNC: 0.6 MG/DL (ref 0–1.2)
BUN BLDV-MCNC: 25 MG/DL (ref 6–23)
CALCIUM SERPL-MCNC: 9.7 MG/DL (ref 8.6–10.2)
CHLORIDE BLD-SCNC: 108 MMOL/L (ref 98–107)
CO2: 21 MMOL/L (ref 22–29)
CREAT SERPL-MCNC: 1.6 MG/DL (ref 0.5–1)
EOSINOPHILS ABSOLUTE: 0 E9/L (ref 0.05–0.5)
EOSINOPHILS RELATIVE PERCENT: 0.9 % (ref 0–6)
GFR AFRICAN AMERICAN: 39
GFR NON-AFRICAN AMERICAN: 32 ML/MIN/1.73
GLUCOSE BLD-MCNC: 80 MG/DL (ref 74–99)
HCT VFR BLD CALC: 29.3 % (ref 34–48)
HEMOGLOBIN: 8.6 G/DL (ref 11.5–15.5)
HEMOGLOBIN: 8.8 G/DL (ref 11.5–15.5)
INR BLD: 1.1
LYMPHOCYTES ABSOLUTE: 0.93 E9/L (ref 1.5–4)
LYMPHOCYTES RELATIVE PERCENT: 15.8 % (ref 20–42)
MAGNESIUM: 1.9 MG/DL (ref 1.6–2.6)
MCH RBC QN AUTO: 31.9 PG (ref 26–35)
MCHC RBC AUTO-ENTMCNC: 29.4 % (ref 32–34.5)
MCV RBC AUTO: 108.5 FL (ref 80–99.9)
METER GLUCOSE: 140 MG/DL (ref 74–99)
METER GLUCOSE: 89 MG/DL (ref 74–99)
MONOCYTES ABSOLUTE: 0.23 E9/L (ref 0.1–0.95)
MONOCYTES RELATIVE PERCENT: 3.5 % (ref 2–12)
MYELOCYTE PERCENT: 1.8 % (ref 0–0)
NEUTROPHILS ABSOLUTE: 4.58 E9/L (ref 1.8–7.3)
NEUTROPHILS RELATIVE PERCENT: 77.2 % (ref 43–80)
OVALOCYTES: ABNORMAL
PDW BLD-RTO: 16.8 FL (ref 11.5–15)
PHOSPHORUS: 2.9 MG/DL (ref 2.5–4.5)
PLATELET # BLD: 135 E9/L (ref 130–450)
PMV BLD AUTO: 12.9 FL (ref 7–12)
POIKILOCYTES: ABNORMAL
POLYCHROMASIA: ABNORMAL
POTASSIUM SERPL-SCNC: 4.1 MMOL/L (ref 3.5–5)
PROTHROMBIN TIME: 12.4 SEC (ref 9.3–12.4)
RBC # BLD: 2.7 E12/L (ref 3.5–5.5)
SODIUM BLD-SCNC: 139 MMOL/L (ref 132–146)
TOTAL PROTEIN: 5.1 G/DL (ref 6.4–8.3)
URINE CULTURE, ROUTINE: NORMAL
VANCOMYCIN RANDOM: 24 MCG/ML (ref 5–40)
WBC # BLD: 5.8 E9/L (ref 4.5–11.5)

## 2022-03-01 PROCEDURE — 80053 COMPREHEN METABOLIC PANEL: CPT

## 2022-03-01 PROCEDURE — 6370000000 HC RX 637 (ALT 250 FOR IP): Performed by: INTERNAL MEDICINE

## 2022-03-01 PROCEDURE — 2060000000 HC ICU INTERMEDIATE R&B

## 2022-03-01 PROCEDURE — 6360000002 HC RX W HCPCS: Performed by: INTERNAL MEDICINE

## 2022-03-01 PROCEDURE — 97110 THERAPEUTIC EXERCISES: CPT

## 2022-03-01 PROCEDURE — 85025 COMPLETE CBC W/AUTO DIFF WBC: CPT

## 2022-03-01 PROCEDURE — 83735 ASSAY OF MAGNESIUM: CPT

## 2022-03-01 PROCEDURE — 84100 ASSAY OF PHOSPHORUS: CPT

## 2022-03-01 PROCEDURE — 94640 AIRWAY INHALATION TREATMENT: CPT

## 2022-03-01 PROCEDURE — 85018 HEMOGLOBIN: CPT

## 2022-03-01 PROCEDURE — APPSS30 APP SPLIT SHARED TIME 16-30 MINUTES: Performed by: NURSE PRACTITIONER

## 2022-03-01 PROCEDURE — 2580000003 HC RX 258: Performed by: INTERNAL MEDICINE

## 2022-03-01 PROCEDURE — 2700000000 HC OXYGEN THERAPY PER DAY

## 2022-03-01 PROCEDURE — 85610 PROTHROMBIN TIME: CPT

## 2022-03-01 PROCEDURE — 99233 SBSQ HOSP IP/OBS HIGH 50: CPT | Performed by: INTERNAL MEDICINE

## 2022-03-01 PROCEDURE — 80202 ASSAY OF VANCOMYCIN: CPT

## 2022-03-01 PROCEDURE — 36415 COLL VENOUS BLD VENIPUNCTURE: CPT

## 2022-03-01 PROCEDURE — 82962 GLUCOSE BLOOD TEST: CPT

## 2022-03-01 PROCEDURE — 94660 CPAP INITIATION&MGMT: CPT

## 2022-03-01 RX ORDER — POLYETHYLENE GLYCOL 3350 17 G/17G
17 POWDER, FOR SOLUTION ORAL 2 TIMES DAILY PRN
Status: DISCONTINUED | OUTPATIENT
Start: 2022-03-01 | End: 2022-03-02 | Stop reason: HOSPADM

## 2022-03-01 RX ORDER — AMOXICILLIN AND CLAVULANATE POTASSIUM 875; 125 MG/1; MG/1
1 TABLET, FILM COATED ORAL EVERY 12 HOURS SCHEDULED
Status: DISCONTINUED | OUTPATIENT
Start: 2022-03-01 | End: 2022-03-02 | Stop reason: HOSPADM

## 2022-03-01 RX ORDER — SODIUM CHLORIDE, SODIUM LACTATE, POTASSIUM CHLORIDE, CALCIUM CHLORIDE 600; 310; 30; 20 MG/100ML; MG/100ML; MG/100ML; MG/100ML
INJECTION, SOLUTION INTRAVENOUS CONTINUOUS
Status: DISCONTINUED | OUTPATIENT
Start: 2022-03-01 | End: 2022-03-02

## 2022-03-01 RX ADMIN — SODIUM CHLORIDE 25 ML: 9 INJECTION, SOLUTION INTRAVENOUS at 01:13

## 2022-03-01 RX ADMIN — MYCOPHENOLATE MOFETIL 1000 MG: 250 CAPSULE ORAL at 20:41

## 2022-03-01 RX ADMIN — ALLOPURINOL 100 MG: 100 TABLET ORAL at 08:36

## 2022-03-01 RX ADMIN — LEVOTHYROXINE SODIUM 50 MCG: 0.05 TABLET ORAL at 06:25

## 2022-03-01 RX ADMIN — DESVENLAFAXINE SUCCINATE 50 MG: 50 TABLET, FILM COATED, EXTENDED RELEASE ORAL at 17:15

## 2022-03-01 RX ADMIN — APIXABAN 5 MG: 5 TABLET, FILM COATED ORAL at 10:41

## 2022-03-01 RX ADMIN — APIXABAN 5 MG: 5 TABLET, FILM COATED ORAL at 20:41

## 2022-03-01 RX ADMIN — CYCLOSPORINE 100 MG: 25 CAPSULE, GELATIN COATED ORAL at 08:36

## 2022-03-01 RX ADMIN — AMOXICILLIN AND CLAVULANATE POTASSIUM 1 TABLET: 875; 125 TABLET, FILM COATED ORAL at 20:41

## 2022-03-01 RX ADMIN — DOCUSATE SODIUM 100 MG: 100 CAPSULE ORAL at 08:37

## 2022-03-01 RX ADMIN — FOLIC ACID 1 MG: 1 TABLET ORAL at 08:37

## 2022-03-01 RX ADMIN — IPRATROPIUM BROMIDE AND ALBUTEROL SULFATE 1 AMPULE: .5; 2.5 SOLUTION RESPIRATORY (INHALATION) at 18:15

## 2022-03-01 RX ADMIN — CLOZAPINE 50 MG: 25 TABLET ORAL at 20:41

## 2022-03-01 RX ADMIN — DOCUSATE SODIUM 100 MG: 100 CAPSULE ORAL at 20:41

## 2022-03-01 RX ADMIN — IPRATROPIUM BROMIDE AND ALBUTEROL SULFATE 1 AMPULE: .5; 2.5 SOLUTION RESPIRATORY (INHALATION) at 06:22

## 2022-03-01 RX ADMIN — BUDESONIDE 500 MCG: 0.5 INHALANT RESPIRATORY (INHALATION) at 18:15

## 2022-03-01 RX ADMIN — CYCLOSPORINE 100 MG: 25 CAPSULE, GELATIN COATED ORAL at 20:41

## 2022-03-01 RX ADMIN — IPRATROPIUM BROMIDE AND ALBUTEROL SULFATE 1 AMPULE: .5; 2.5 SOLUTION RESPIRATORY (INHALATION) at 13:39

## 2022-03-01 RX ADMIN — PREDNISONE 5 MG: 5 TABLET ORAL at 08:36

## 2022-03-01 RX ADMIN — BUDESONIDE 500 MCG: 0.5 INHALANT RESPIRATORY (INHALATION) at 06:22

## 2022-03-01 RX ADMIN — MYCOPHENOLATE MOFETIL 1000 MG: 250 CAPSULE ORAL at 08:37

## 2022-03-01 RX ADMIN — PIPERACILLIN SODIUM AND TAZOBACTAM SODIUM 3375 MG: 3; .375 INJECTION, POWDER, LYOPHILIZED, FOR SOLUTION INTRAVENOUS at 04:29

## 2022-03-01 RX ADMIN — IPRATROPIUM BROMIDE AND ALBUTEROL SULFATE 1 AMPULE: .5; 2.5 SOLUTION RESPIRATORY (INHALATION) at 10:30

## 2022-03-01 ASSESSMENT — PAIN SCALES - GENERAL
PAINLEVEL_OUTOF10: 0

## 2022-03-01 NOTE — PROGRESS NOTES
INPATIENT CARDIOLOGY Follow UP    Name: Escobar Nur    Age: 73 y.o. Date of Admission: 2022  1:08 AM    Date of Service: 3/1/2022      Referring Physician: Tama Goldberg, MD      Subjective: Denies chest pain or shortness of breath. She is now back on Eliquis and his H&H will need to be monitored closely. Also please resume low-dose beta-blocker for rate control if blood pressure can tolerate.         Past Medical History:  Past Medical History:   Diagnosis Date    Abnormal EKG     left bundle branch block    Acute gout involving toe of right foot 2020    Acute on chronic combined systolic (congestive) and diastolic (congestive) heart failure (HCC)     Cancer (HCC)     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    Cerebrovascular disease     Clotting disorder ([address] 1985    thrombophlebitis after c section delivery    Depression     15 years followed by dr Cristin Rubio Hyperlipidemia     Hypertension     Hypothyroidism     Leg swelling 2020    Lymphedema of both lower extremities 2020    Peripheral vascular disease ([address]     Renal failure 2012    renal transplant    Thrombophlebitis     after c section delivery    Thyroid disease        Past Surgical History:  Past Surgical History:   Procedure Laterality Date     SECTION  1985    one normal delivery    COLECTOMY N/A 1/15/2022    DIAGNOSTIC  LAPAROSCOPY LYSIS OF ADHESIONS performed by Keven aSnchez MD at [address] [place] [address] Drive CATH LAB PROCEDURE      normal coronaries and  normal coronaries    DIALYSIS FISTULA CREATION  march and 2012    phase one and two patient will start dialysis when needed   [address]    transfemoral venous bypass grafts    IR IVC FILTER PLACEMENT W IMAGING  2022    IR IVC FILTER PLACEMENT W IMAGING 2022 [facility]ED[facility]    KIDNEY TRANSPLANT  2016    KIDNEY TRANSPLANT  2015    KIDNEY TRANSPLANT     PARATHYROIDECTOMY  2006    left parathyroid  implant and parathyroidectomy    TRANSESOPHAGEAL ECHOCARDIOGRAM N/A 2022    TRANSESOPHAGEAL ECHOCARDIOGRAM WITH BUBBLE STUDY performed by Kolton Rogers MD at [facility] ENDOSCOPY       Family History:  Family History   Problem Relation Age of Onset    Diabetes Mother     Diabetes Father     Dementia Father        Social History:  Social History     Socioeconomic History    Marital status:      Spouse name: Not on file    Number of children: Not on file    Years of education: Not on file    Highest education level: Not on file   Occupational History    Not on file   Tobacco Use    Smoking status: Former Smoker     Packs/day: 1.00     Years: 20.00     Pack years: 20.00     Quit date: 10/1/2015     Years since quittin.4    Smokeless tobacco: Never Used   Vaping Use    Vaping Use: Never used   Substance and Sexual Activity    Alcohol use: No     Comment: 2 cups coffee per day    Drug use: No    Sexual activity: Not on file   Other Topics Concern    Not on file   Social History Narrative    Not on file     Social Determinants of Health     Financial Resource Strain:     Difficulty of Paying Living Expenses: Not on file   Food Insecurity:     Worried About Running Out of Food in the Last Year: Not on file    Vannessa of Food in the Last Year: Not on file   Transportation Needs:     Lack of Transportation (Medical): Not on file    Lack of Transportation (Non-Medical):  Not on file   Physical Activity:     Days of Exercise per Week: Not on file    Minutes of Exercise per Session: Not on file   Stress:     Feeling of Stress : Not on file   Social Connections:     Frequency of Communication with Friends and Family: Not on file    Frequency of Social Gatherings with Friends and Family: Not on file    Attends Druze Services: Not on file    Active Member of Clubs or Organizations: Not on file    Attends Club or Organization Meetings: Not on file    Marital Status: Not on file   Intimate Partner Violence:     Fear of Current or Ex-Partner: Not on file    Emotionally Abused: Not on file    Physically Abused: Not on file    Sexually Abused: Not on file   Housing Stability:     Unable to Pay for Housing in the Last Year: Not on file    Number of [place] in the Last Year: Not on file    Unstable Housing in the Last Year: Not on file       Allergies: Allergies   Allergen Reactions    Macrodantin [Nitrofurantoin Macrocrystal]     Sulfa Antibiotics        Home Medications:  Prior to Admission medications    Medication Sig Start Date End Date Taking?  Authorizing Provider   apixaban (ELIQUIS) 5 MG TABS tablet Take 5 mg by mouth 2 times daily Take in the morning and at bedtime for 3 months (started 2/19/2022)   Yes Historical Provider, MD   atorvastatin (LIPITOR) 10 MG tablet Take 10 mg by mouth daily   Yes Historical Provider, MD   furosemide (LASIX) 20 MG tablet  12/22/21  Yes Historical Provider, MD   lisinopril (PRINIVIL;ZESTRIL) 5 MG tablet take 1 tablet by mouth once daily 12/8/21  Yes Historical Provider, MD   pregabalin (LYRICA) 75 MG capsule  12/26/21  Yes Historical Provider, MD   allopurinol (ZYLOPRIM) 100 MG tablet Take 100 mg by mouth daily   Yes Historical Provider, MD   metoprolol succinate (TOPROL XL) 50 MG extended release tablet take 1 tablet by mouth once daily 4/17/20  Yes Sandoval Da Silva MD   desvenlafaxine succinate (PRISTIQ) 50 MG TB24 extended release tablet Take 1 tablet by mouth every evening 12/20/19  Yes Yovani Lake MD   cloZAPine (CLOZARIL) 50 MG tablet Take 50 mg by mouth nightly   Yes Historical Provider, MD   cycloSPORINE (SANDIMMUNE) 100 MG capsule Take 100 mg by mouth 2 times daily   Yes Historical Provider, MD   predniSONE (DELTASONE) 5 MG tablet Take 5 mg by mouth daily   Yes Historical Provider, MD   folic acid (FOLVITE) 1 MG tablet Take 1 mg by mouth daily   Yes Historical Provider, MD docusate sodium (COLACE) 100 MG capsule Take 100 mg by mouth 2 times daily   Yes Historical Provider, MD   mycophenolate (CELLCEPT) 500 MG tablet Take 1,000 mg by mouth 2 times daily   Yes Historical Provider, MD   levothyroxine (SYNTHROID) 50 MCG tablet Take 50 mcg by mouth daily. Yes Historical Provider, MD   nystatin (MYCOSTATIN) 824264 UNIT/GM powder Apply 3 times daily. 12/27/21   Ilana Panda APRN - RAHAT   ipratropium-albuterol (DUONEB) 0.5-2.5 (3) MG/3ML SOLN nebulizer solution Inhale 3 mLs into the lungs every 6 hours as needed for Shortness of Breath 11/27/21   Susannah Ramirez MD   budesonide (PULMICORT) 0.5 MG/2ML nebulizer suspension Take 2 mLs by nebulization 2 times daily for 10 days 11/27/21 12/7/21  Paige Farrell MD   ondansetron (ZOFRAN-ODT) 4 MG disintegrating tablet Take 1 tablet by mouth every 8 hours as needed for Nausea or Vomiting 11/27/21   Trista Rota MD James   Cyanocobalamin (B-12 COMPLIANCE INJECTION) 1000 MCG/ML KIT Inject as directed monthly    Historical Provider, MD   simvastatin (ZOCOR) 20 MG tablet Take 20 mg by mouth nightly.       Historical Provider, MD       Current Medications:  Current Facility-Administered Medications   Medication Dose Route Frequency Provider Last Rate Last Admin    lactated ringers infusion   IntraVENous Continuous Jeovany Haknins DO        apixaban (ELIQUIS) tablet 5 mg  5 mg Oral BID Jeovany Hankins DO   5 mg at 03/01/22 1041    polyethylene glycol (GLYCOLAX) packet 17 g  17 g Oral BID PRN Roscoe Singh MD        amoxicillin-clavulanate (AUGMENTIN) 875-125 MG per tablet 1 tablet  1 tablet Oral 2 times per day Elio Sanchez, DO        sodium chloride flush 0.9 % injection 5-40 mL  5-40 mL IntraVENous 2 times per day Jeovany Hankins DO   10 mL at 02/28/22 2326    0.9 % sodium chloride infusion  25 mL IntraVENous PRN Jeovany Hankins DO        heparin flush 100 UNIT/ML injection 100 Units  1 mL IntraCATHeter PRN Kelsie Malik Nhi, DO        lidocaine PF 1 % injection 5 mL  5 mL IntraDERmal Once Burnie Handing, DO        sodium chloride flush 0.9 % injection 5-40 mL  5-40 mL IntraVENous 2 times per day Burnie Handing, DO   10 mL at 02/28/22 2326    0.9 % sodium chloride infusion  25 mL IntraVENous PRN Jeovany Hankins, DO        heparin flush 100 UNIT/ML injection 300 Units  3 mL IntraVENous 2 times per day Burnie Handing, DO        heparin flush 100 UNIT/ML injection 300 Units  3 mL IntraCATHeter PRN Burnie Handing, DO        vancomycin (VANCOCIN) intermittent dosing (placeholder)   Other RX Placeholder Debi Jones MD        sodium chloride flush 0.9 % injection 5-40 mL  5-40 mL IntraVENous PRN KATINA Duncan CNP   10 mL at 02/27/22 2205    0.9 % sodium chloride infusion  25 mL IntraVENous PRN KATINA Duncan CNP        heparin flush 100 UNIT/ML injection 100 Units  1 mL IntraCATHeter PRN KATINA Duncan CNP        pantoprazole (PROTONIX) injection 40 mg  40 mg IntraVENous BID Goddard Jacobson, DO   40 mg at 02/28/22 2143    sodium chloride flush 0.9 % injection 5-40 mL  5-40 mL IntraVENous PRN Susannah Ramirez MD        0.9 % sodium chloride infusion  25 mL IntraVENous PRN Jefry Ramirez MD        ondansetron (ZOFRAN-ODT) disintegrating tablet 4 mg  4 mg Oral Q8H PRN Susannah Ramirez MD        Or    ondansetron (ZOFRAN) injection 4 mg  4 mg IntraVENous Q6H PRN Jefry Ramirez MD        acetaminophen (TYLENOL) tablet 650 mg  650 mg Oral Q6H PRN Yovani Lake MD   650 mg at 02/27/22 5725    Or    acetaminophen (TYLENOL) suppository 650 mg  650 mg Rectal Q6H PRN Jefry Ramirez MD        ipratropium-albuterol (DUONEB) nebulizer solution 1 ampule  1 ampule Inhalation 4x daily Yovani Lake MD   1 ampule at 03/01/22 1339    allopurinol (ZYLOPRIM) tablet 100 mg  100 mg Oral Daily Susannah Ramirez MD   100 mg at 03/01/22 0836    budesonide (PULMICORT) nebulizer suspension 500 mcg 500 mcg Nebulization BID Erica Levi MD   500 mcg at 03/01/22 0569    cloZAPine (CLOZARIL) tablet 50 mg  50 mg Oral Nightly Holly Burt Ramirez MD   50 mg at 02/28/22 2142    cycloSPORINE (SANDIMMUNE) capsule 100 mg  100 mg Oral BID Erica Levi MD   100 mg at 03/01/22 7196    desvenlafaxine succinate (PRISTIQ) extended release tablet 50 mg  50 mg Oral QPM Erica Levi MD   50 mg at 02/28/22 2339    docusate sodium (COLACE) capsule 100 mg  100 mg Oral BID Erica Levi MD   100 mg at 78/18/34 5553    folic acid (FOLVITE) tablet 1 mg  1 mg Oral Daily Erica Levi MD   1 mg at 03/01/22 1421    levothyroxine (SYNTHROID) tablet 50 mcg  50 mcg Oral Daily Erica Levi MD   50 mcg at 03/01/22 7467    predniSONE (DELTASONE) tablet 5 mg  5 mg Oral Daily Erica Levi MD   5 mg at 03/01/22 0836    mycophenolate (CELLCEPT) capsule 1,000 mg  1,000 mg Oral BID Erica Levi MD   1,000 mg at 03/01/22 0837    [Held by provider] piperacillin-tazobactam (ZOSYN) 3,375 mg in dextrose 5 % 50 mL IVPB extended infusion (mini-bag)  3,375 mg IntraVENous Q8H Anjali Gomes MD   Stopped at 03/01/22 0645    0.9 % sodium chloride bolus  25 mL IntraVENous Q8H Anjali Gomes MD   Stopped at 03/01/22 0323      lactated ringers      sodium chloride      sodium chloride      sodium chloride      sodium chloride         Physical Exam:  BP (!) 130/58   Pulse 95   Temp 97.7 °F (36.5 °C) (Infrared)   Resp 18   Ht 5' 2\" (1.575 m)   Wt 220 lb (99.8 kg)   SpO2 96%   BMI 40.24 kg/m²   Wt Readings from Last 3 Encounters:   02/25/22 220 lb (99.8 kg)   01/19/22 216 lb (98 kg)   11/17/21 227 lb 3.2 oz (103.1 kg)       Appearance: Alert and oriented x3 not in acute distress.   Skin: Dry skin  Head: Atraumatic  Eyes: Intact extraocular muscles   ENMT: Mucous membranes are moist  Neck: Supple  Lungs: Clear to auscultation  Cardiac: Normal S1 and S2  Abdomen: Protuberant  Extremities: Intact range of motion  Neurologic: No focal neurological deficits  Peripheral Pulses: 2+ peripheral pulses    Intake/Output:    Intake/Output Summary (Last 24 hours) at 3/1/2022 1713  Last data filed at 3/1/2022 1554  Gross per 24 hour   Intake 290 ml   Output 1025 ml   Net -735 ml     I/O this shift:  In: 290 [P.O.:290]  Out: 375 [Urine:375]    Laboratory Tests:  Recent Labs     02/27/22  0344 02/28/22  1350 03/01/22  0527    138 139   K 4.0 4.3 4.1    106 108*   CO2 21* 23 21*   BUN 31* 25* 25*   CREATININE 1.7* 1.6* 1.6*   GLUCOSE 100* 81 80   CALCIUM 9.1 9.6 9.7     Lab Results   Component Value Date    MG 1.9 03/01/2022    MG 2.1 02/28/2022    MG 1.5 02/27/2022     Recent Labs     02/27/22  0344 02/28/22  1350 03/01/22  0527   ALKPHOS 72 75 72   ALT 6 8 8   AST 11 12 10   PROT 5.0* 5.2* 5.1*   BILITOT 0.7 0.6 0.6   LABALBU 2.2* 2.5* 2.6*     Recent Labs     02/27/22  0344 02/27/22  0953 02/27/22  1602 02/27/22  2241 02/28/22  1350 02/28/22  1350 02/28/22  2307 03/01/22  0527 03/01/22  1101   WBC 5.6  --   --   --  5.5  --   --  5.8  --    RBC 2.22*  --   --   --  2.81*  --   --  2.70*  --    HGB 7.1*   < > 8.4*   < > 9.0*   < > 9.1* 8.6* 8.8*   HCT 25.5*  --  28.6*  --  30.8*  --   --  29.3*  --    .9*  --   --   --  109.6*  --   --  108.5*  --    MCH 32.0  --   --   --  32.0  --   --  31.9  --    MCHC 27.8*  --   --   --  29.2*  --   --  29.4*  --    RDW 17.2*  --   --   --  17.0*  --   --  16.8*  --      --   --   --  127*  --   --  135  --    MPV 13.0*  --   --   --  13.3*  --   --  12.9*  --     < > = values in this interval not displayed. Lab Results   Component Value Date    CKTOTAL 21 02/25/2022     No results for input(s): CKTOTAL, CKMB, CKMBINDEX, TROPHS in the last 72 hours.   Lab Results   Component Value Date    INR 1.1 03/01/2022    INR 1.1 01/23/2022    INR 1.0 11/20/2021    PROTIME 12.4 03/01/2022    PROTIME 13.0 (H) 01/23/2022    PROTIME 11.5 11/20/2021     Lab Results Component Value Date    TSH 0.376 11/17/2021    TSH 0.807 12/22/2019     Lab Results   Component Value Date    LABA1C 5.7 (H) 11/17/2021     No results found for: EAG  No results found for: CHOL  No results found for: TRIG  No results found for: HDL  No results found for: LDLCALC, LDLCHOLESTEROL  No results found for: LABVLDL, VLDL  No results found for: CHOLHDLRATIO  No results for input(s): PROBNP in the last 72 hours. Cardiac Tests:  EKG reviewed (EKG date: EKG shows sinus rhythm, left bundle branch block, rate of 98 beats per):        Echocardiogram reviewed: January 2022  Mild concentric left ventricular hypertrophy. There is doppler evidence of stage I diastolic dysfunction. Ejection fraction is visually estimated at 60 to 65%. Normal right ventricular size and function. Mild tricuspid regurgitation. Signature    Stress test reviewed:      Cardiac catheterization reviewed:     CXR reviewed: The ASCVD Risk score (Reno Avendaño., et al., 2013) failed to calculate for the following reasons:    Cannot find a previous HDL lab    Cannot find a previous total cholesterol lab    ASSESSMENT / PLAN:    1. Acute respiratory failure with hypoxia (HCC)  Management per pulmonary and critical care  2. Colitis and anemia now being evaluated for EGD and cardiology called for preoperative cardiovascular evaluation  From cardiovascular perspective patient may proceed to the plan GI procedure without additional cardiac testing  Please check with pulmonology prior to the procedure given patient's pulmonary status  Call cardiology if there are concerns prior to, during or after the procedure  Cardiology will monitor closely and follow patient    3. Dehydration  Management per primary service  4. Elevated troponin  Likely demand ischemia in the setting of anemia  Proceed with GI evaluation  Rest of cardiac evaluation will be discussed when patient is stable and discharged in the outpatient    5.   Paroxysmal atrial fibrillation  She is now back on Eliquis and his H&H will need to be monitored closely. Also please resume low-dose beta-blocker for rate control if blood pressure can tolerate. Consider watchman device evaluation in the outpatient especially if patient is unable to be on anticoagulation due to bleeding risk    6. DVT  Now back on anticoagulation    7. COPD    8. Sleep apnea    9. Chronic kidney disease  Monitor BUN/creatinine closely and avoid nephrotoxic agents    10. Bacteremia/positive blood culture  CLEMENCIA on February 28, 2022 revealed no evidence of endocarditis            Thank you for allowing me to participate in your patient's care. Please feel free to contact me if you have any questions or concerns.     Sheldon Camarillo MD  [facility]) Cardiology

## 2022-03-01 NOTE — CARE COORDINATION
SS Note: Covid negative 2/25/22. WILL NEED RAPID COVID PRIOR TO DC. CLEMENCIA yesterday was negative, pt for PICC today for IV access, pt also on IV Zosyn. Pt was admitted from [facility] and [place], [facility], Fax [phone], and plan is for pt to return to SNF. NO PRECERT required. GINA will need signed by physician.   Electronically signed by AG Oakley on 3/1/2022 at 10:45 AM

## 2022-03-01 NOTE — PLAN OF CARE
Patient's chart updated to reflect:      . -HF discharge instructions. Addie Caputo, TAHMINA   Heart Failure NavigatorPatient's chart updated to reflect:      .

## 2022-03-01 NOTE — CONSULTS
[facility] MD Dev Sutherland MD Samuel Felling, MD Pennelope Liner, MD Galina Quevedo, RAHAT Roman, ZAIER  Consultation  Patient's Name: Merlyn Lombardo  2:58 PM  3/1/2022    3/1 : Seen in her room , on RA . Alert oriented , appear comfortable , euvolemic    History of Present Ilness: This is 73years old  lady well-known to our group with multimedical problems presented to the hospital with shortness of breath excessively with acute respiratory failure with hypoxia, patient has mild medical problems including history of CKD status post kidney transplant in  she has acute on chronic combined systolic and diastolic congestive heart failure, history of gout, history of bundle branch block, history of depression, hyperlipidemia, hypertension, hypothyroidism, history of lymphedema both lower extremities,  Patient is Covid negative and currently she is placed on BiPAP for oxygenation.     Past Medical History:   Diagnosis Date    Abnormal EKG     left bundle branch block    Acute gout involving toe of right foot 2020    Acute on chronic combined systolic (congestive) and diastolic (congestive) heart failure (HCC)     Cancer (HCC)     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    Cerebrovascular disease     Clotting disorder ([address] 1985    thrombophlebitis after c section delivery    Depression     15 years followed by dr Marino Xie Hyperlipidemia     Hypertension     Hypothyroidism     Leg swelling 2020    Lymphedema of both lower extremities 2020    Peripheral vascular disease ([address]     Renal failure 2012    renal transplant    Thrombophlebitis     after c section delivery    Thyroid disease        Past Surgical History:   Procedure Laterality Date     SECTION  1985    one normal delivery    COLECTOMY N/A 1/15/2022    DIAGNOSTIC  LAPAROSCOPY LYSIS OF ADHESIONS performed by Aurelia Koo, MD at [address] [place] [address] Drive CATH LAB PROCEDURE  2006    normal coronaries and 1996 normal coronaries    DIALYSIS FISTULA CREATION  march and july 2012    phase one and two patient will start dialysis when needed   [address]    transfemoral venous bypass grafts    IR IVC FILTER PLACEMENT W IMAGING  2/26/2022    IR IVC FILTER PLACEMENT W IMAGING 2/26/2022 [id number] SPECIAL PROCEDURES    KIDNEY TRANSPLANT  2016    KIDNEY TRANSPLANT  2015    KIDNEY TRANSPLANT  2015    PARATHYROIDECTOMY  2006    left parathyroid  implant and parathyroidectomy    TRANSESOPHAGEAL ECHOCARDIOGRAM N/A 2/28/2022    TRANSESOPHAGEAL ECHOCARDIOGRAM WITH BUBBLE STUDY performed by Darrell Graves MD at [address] [place] [address] History   Problem Relation Age of Onset    Diabetes Mother     Diabetes Father     Dementia Father         reports that she quit smoking about 6 years ago. She has a 20.00 pack-year smoking history. She has never used smokeless tobacco. She reports that she does not drink alcohol and does not use drugs.     Allergies:  Macrodantin [nitrofurantoin macrocrystal] and Sulfa antibiotics    Current Medications:    lactated ringers infusion, Continuous  apixaban (ELIQUIS) tablet 5 mg, BID  polyethylene glycol (GLYCOLAX) packet 17 g, BID PRN  amoxicillin-clavulanate (AUGMENTIN) 875-125 MG per tablet 1 tablet, 2 times per day  sodium chloride flush 0.9 % injection 5-40 mL, 2 times per day  0.9 % sodium chloride infusion, PRN  heparin flush 100 UNIT/ML injection 100 Units, PRN  lidocaine PF 1 % injection 5 mL, Once  sodium chloride flush 0.9 % injection 5-40 mL, 2 times per day  0.9 % sodium chloride infusion, PRN  heparin flush 100 UNIT/ML injection 300 Units, 2 times per day  heparin flush 100 UNIT/ML injection 300 Units, PRN  vancomycin (VANCOCIN) intermittent dosing (placeholder), RX Placeholder  sodium chloride flush 0.9 % injection 5-40 mL, PRN  0.9 % sodium chloride infusion, PRN  heparin flush 100 UNIT/ML injection 100 Units, PRN  pantoprazole (PROTONIX) injection 40 mg, BID  sodium chloride flush 0.9 % injection 5-40 mL, PRN  0.9 % sodium chloride infusion, PRN  ondansetron (ZOFRAN-ODT) disintegrating tablet 4 mg, Q8H PRN   Or  ondansetron (ZOFRAN) injection 4 mg, Q6H PRN  acetaminophen (TYLENOL) tablet 650 mg, Q6H PRN   Or  acetaminophen (TYLENOL) suppository 650 mg, Q6H PRN  ipratropium-albuterol (DUONEB) nebulizer solution 1 ampule, 4x daily  allopurinol (ZYLOPRIM) tablet 100 mg, Daily  budesonide (PULMICORT) nebulizer suspension 500 mcg, BID  cloZAPine (CLOZARIL) tablet 50 mg, Nightly  cycloSPORINE (SANDIMMUNE) capsule 100 mg, BID  desvenlafaxine succinate (PRISTIQ) extended release tablet 50 mg, QPM  docusate sodium (COLACE) capsule 089 mg, BID  folic acid (FOLVITE) tablet 1 mg, Daily  levothyroxine (SYNTHROID) tablet 50 mcg, Daily  predniSONE (DELTASONE) tablet 5 mg, Daily  mycophenolate (CELLCEPT) capsule 1,000 mg, BID  [Held by provider] piperacillin-tazobactam (ZOSYN) 3,375 mg in dextrose 5 % 50 mL IVPB extended infusion (mini-bag), Q8H  0.9 % sodium chloride bolus, Q8H        Review of Systems:       Physical exam:   Vital signs stable afebrile  GEN appearance, patient somnolent, hardly reactive to my verbal stimulation  Head and ENT; normocephalic/atraumatic/supple neck/no JVD  Lungs; anterior rhonchi  Heart; regular rate and rhythm  Abdomen; obese no tenderness  Extremities bilateral swelling both lower extremities  Neurologic; somnolent    Data:   Labs:  CBC with Differential:    Lab Results   Component Value Date    WBC 5.8 03/01/2022    RBC 2.70 03/01/2022    HGB 8.8 03/01/2022    HCT 29.3 03/01/2022     03/01/2022    .5 03/01/2022    MCH 31.9 03/01/2022    MCHC 29.4 03/01/2022    RDW 16.8 03/01/2022    NRBC 1.0 01/13/2022    METASPCT 1.8 02/28/2022    LYMPHOPCT 15.8 03/01/2022    MONOPCT 3.5 03/01/2022    MYELOPCT 1.8 03/01/2022    BASOPCT 1.8 03/01/2022 MONOSABS 0.23 03/01/2022    LYMPHSABS 0.93 03/01/2022    EOSABS 0.00 03/01/2022    BASOSABS 0.10 03/01/2022     CMP:    Lab Results   Component Value Date     03/01/2022    K 4.1 03/01/2022    K 4.7 02/26/2022     03/01/2022    CO2 21 03/01/2022    BUN 25 03/01/2022    CREATININE 1.6 03/01/2022    GFRAA 39 03/01/2022    LABGLOM 32 03/01/2022    GLUCOSE 80 03/01/2022    PROT 5.1 03/01/2022    LABALBU 2.6 03/01/2022    CALCIUM 9.7 03/01/2022    BILITOT 0.6 03/01/2022    ALKPHOS 72 03/01/2022    AST 10 03/01/2022    ALT 8 03/01/2022     Ionized Calcium:  No results found for: IONCA  Magnesium:    Lab Results   Component Value Date    MG 1.9 03/01/2022     Phosphorus:    Lab Results   Component Value Date    PHOS 2.9 03/01/2022     U/A:    Lab Results   Component Value Date    COLORU Yellow 02/26/2022    PHUR 5.0 02/26/2022    WBCUA 2-5 02/26/2022    RBCUA 5-10 02/26/2022    RBCUA 10-20 03/12/2014    YEAST Present 04/09/2021    BACTERIA FEW 02/26/2022    CLARITYU Clear 02/26/2022    SPECGRAV 1.015 02/26/2022    LEUKOCYTESUR TRACE 02/26/2022    UROBILINOGEN 0.2 02/26/2022    BILIRUBINUR Negative 02/26/2022    BLOODU LARGE 02/26/2022    GLUCOSEU Negative 02/26/2022     Microalbumen/Creatinine ratio:  No components found for: RUCREAT  Iron Saturation:  No components found for: PERCENTFE  TIBC:    Lab Results   Component Value Date    TIBC 135 01/19/2022     FERRITIN:    Lab Results   Component Value Date    FERRITIN 4,062 01/23/2022        Imaging:  US DUP UPPER EXTREMITY LEFT VENOUS   Final Result   No evidence of DVT. IR GUIDED IVC FILTER PLACEMENT   Final Result   Successful vena cava filter placement. PLAN:   Retrieval intent (MIPS): The filter is potentially retrievable. Plan/Timing For Retrieval:      Ordering Physician/Contact For Retrieval Plan: Leora AGUIRRE ULTRASOUND GUIDANCE VASCULAR ACCESS   Final Result   Successful vena cava filter placement.       PLAN: Retrieval intent (MIPS): The filter is potentially retrievable. Plan/Timing For Retrieval:      Ordering Physician/Contact For Retrieval Plan: [facility] MODIFIED BARIUM SWALLOW W VIDEO   Final Result   Strands it laryngeal penetration with thin liquid barium. No evidence of   aspiration. Please see separate speech pathology report for full discussion of findings   and recommendations. RECOMMENDATIONS:   Unavailable         US DUP LOWER EXTREMITIES BILATERAL VENOUS   Final Result   1. INCOMPLETELY OCCLUSIVE THROMBI in the mid and distal LEFT femoral vein and   within the popliteal vein. They have developed in the interim since the   previous study from 11/17/2021.   2. No evidence of DVT in the right lower extremity. RECOMMENDATIONS:   Unavailable         NM LUNG VENT/PERFUSION (VQ)   Final Result   Negative for pulmonary embolus. XR CHEST 1 VIEW   Final Result   1. Mild cardiomegaly. There is no evidence of failure or pneumonia. CT HEAD WO CONTRAST   Final Result   No acute intracranial abnormality. MRI would be useful if symptoms persist.      Inflammatory changes of left greater than right mastoid air cells and left   sphenoid sinus similar to prior MRI. RECOMMENDATIONS:   Unavailable         CTA PULMONARY W CONTRAST   Final Result   Allowing for patient motion artifact, no identified pulmonary embolism. Significant improvement in aeration of the lungs with some residual areas of   presumed atelectasis in the right greater than left base. Cardiomegaly. RECOMMENDATIONS:   Unavailable         CT ABDOMEN PELVIS W IV CONTRAST Additional Contrast? None   Final Result   Thickening of the proximal ascending colon. Given the short-term change,   this is favored to represent a localized area of colitis likely infectious or   inflammatory. Neoplasia thought less likely but follow-up to resolution   would be recommended.       Moderate stool in the distal colon. Minimal distention of several proximal small bowel loops likely incidental or   due to mild enteritis. Small hiatal hernia. Other chronic appearing findings. RECOMMENDATIONS:   Unavailable         IR FLUORO GUIDED CVA DEVICE PLMT/REPLACE/REMOVAL    (Results Pending)   IR ULTRASOUND GUIDANCE VASCULAR ACCESS    (Results Pending)       Assessment    -Hx of  donor kidney transplant :  Allograft function at baseline 1.1-1.6 with a lot of fluctuation . Did receive iv dye during ivc filter and cta . Continue conservative iv fluids till am   Cr not far away from baseline     -Immunosuppression :   Pt clinically not septic   Continue MMF 1000 mg bid   Continue cyclopsorin 100 mg bid   Continue prednsion 5 mg daily   Defer PCP ppx to ID .     -Acute DVT of LLE :   S/p ivc filter   Restarted on eliquis today     -Anaemia :  Acute/chronic   GI on board with plan for EGD     -Acute hypoxic respiratory failure :  Felt to be due to aspiration pneumonitis on top of underlying respiratory disease due to JORGE .            Electronically signed by Heraclio Foy MD on 3/1/2022 at 2:58 PM

## 2022-03-01 NOTE — PROGRESS NOTES
[address] [place], [place] [address]ist   Progress Note    Admitting Date and Time: 2/25/2022  1:08 AM  Admit Dx: Dehydration [E86.0]  Colitis [K52.9]  Hypoxia [R09.02]  Elevated troponin [R77.8]  Acute respiratory failure with hypoxia (HCC) [J96.01]  Altered mental status, unspecified altered mental status type [R41.82]    Subjective:    Pt resting comfortably with eyes closed. Easily awaken, she denies any complaints of abdominal pain or shortness of breath. Patient did not have a EGD as she does not feel good about having it done. ROS: denies fever, chills, cp, sob, n/v, HA unless stated above.      sodium chloride flush  5-40 mL IntraVENous 2 times per day    lidocaine PF  5 mL IntraDERmal Once    sodium chloride flush  5-40 mL IntraVENous 2 times per day    heparin flush  3 mL IntraVENous 2 times per day    vancomycin (VANCOCIN) intermittent dosing (placeholder)   Other RX Placeholder    pantoprazole  40 mg IntraVENous BID    ipratropium-albuterol  1 ampule Inhalation 4x daily    allopurinol  100 mg Oral Daily    budesonide  500 mcg Nebulization BID    cloZAPine  50 mg Oral Nightly    cycloSPORINE  100 mg Oral BID    desvenlafaxine succinate  50 mg Oral QPM    docusate sodium  100 mg Oral BID    folic acid  1 mg Oral Daily    levothyroxine  50 mcg Oral Daily    predniSONE  5 mg Oral Daily    mycophenolate  1,000 mg Oral BID    piperacillin-tazobactam  3,375 mg IntraVENous Q8H    sodium chloride  25 mL IntraVENous Q8H     sodium chloride, 25 mL, PRN  heparin flush, 1 mL, PRN  sodium chloride, 25 mL, PRN  heparin flush, 3 mL, PRN  sodium chloride flush, 5-40 mL, PRN  sodium chloride, 25 mL, PRN  heparin flush, 1 mL, PRN  sodium chloride flush, 5-40 mL, PRN  sodium chloride, 25 mL, PRN  ondansetron, 4 mg, Q8H PRN   Or  ondansetron, 4 mg, Q6H PRN  polyethylene glycol, 17 g, Daily PRN  acetaminophen, 650 mg, Q6H PRN   Or  acetaminophen, 650 mg, Q6H PRN         Objective:    /82 Pulse 101   Temp 97.9 °F (36.6 °C) (Infrared)   Resp 16   Ht 5' 2\" (1.575 m)   Wt 220 lb (99.8 kg)   SpO2 100%   BMI 40.24 kg/m²   General Appearance: alert and oriented to person, place and time and in no acute distress, obese  Skin: warm and dry  Head: normocephalic and atraumatic  Eyes: pupils equal, round, and reactive to light, extraocular eye movements intact, conjunctivae normal  Neck: neck supple and non tender without mass   Pulmonary/Chest: diminished and clear to auscultation bilaterally- no wheezes, rales or rhonchi, normal air movement, no respiratory distress  Cardiovascular: normal rate, normal S1 and S2 and no carotid bruits  Abdomen: soft, non-tender, non-distended, normal bowel sounds, no masses or organomegaly  Extremities: no cyanosis, no clubbing and 1-2+ BLE edema  Neurologic: no cranial nerve deficit and speech normal      Recent Labs     02/27/22 0344 02/28/22  1350 03/01/22  0527    138 139   K 4.0 4.3 4.1    106 108*   CO2 21* 23 21*   BUN 31* 25* 25*   CREATININE 1.7* 1.6* 1.6*   GLUCOSE 100* 81 80   CALCIUM 9.1 9.6 9.7       Recent Labs     02/27/22  0344 02/28/22  1350 03/01/22  0527   ALKPHOS 72 75 72   PROT 5.0* 5.2* 5.1*   LABALBU 2.2* 2.5* 2.6*   BILITOT 0.7 0.6 0.6   AST 11 12 10   ALT 6 8 8       Recent Labs     02/27/22  0344 02/27/22  0953 02/27/22  1602 02/27/22  2241 02/28/22  1350 02/28/22  2307 03/01/22  0527   WBC 5.6  --   --   --  5.5  --  5.8   RBC 2.22*  --   --   --  2.81*  --  2.70*   HGB 7.1*   < > 8.4*   < > 9.0* 9.1* 8.6*   HCT 25.5*  --  28.6*  --  30.8*  --  29.3*   .9*  --   --   --  109.6*  --  108.5*   MCH 32.0  --   --   --  32.0  --  31.9   MCHC 27.8*  --   --   --  29.2*  --  29.4*   RDW 17.2*  --   --   --  17.0*  --  16.8*     --   --   --  127*  --  135   MPV 13.0*  --   --   --  13.3*  --  12.9*    < > = values in this interval not displayed.        HgBA1c:    Lab Results   Component Value Date    LABA1C 5.7 11/17/2021 Radiology:   US DUP UPPER EXTREMITY LEFT VENOUS   Final Result   No evidence of DVT. IR GUIDED IVC FILTER PLACEMENT   Final Result   Successful vena cava filter placement. PLAN:   Retrieval intent (MIPS): The filter is potentially retrievable. Plan/Timing For Retrieval:      Ordering Physician/Contact For Retrieval Plan: Albaro Mahajan         IR ULTRASOUND GUIDANCE VASCULAR ACCESS   Final Result   Successful vena cava filter placement. PLAN:   Retrieval intent (MIPS): The filter is potentially retrievable. Plan/Timing For Retrieval:      Ordering Physician/Contact For Retrieval Plan: Albaro Mahajan         [place] MODIFIED BARIUM SWALLOW W VIDEO   Final Result   Strands it laryngeal penetration with thin liquid barium. No evidence of   aspiration. Please see separate speech pathology report for full discussion of findings   and recommendations. RECOMMENDATIONS:   Unavailable         US DUP LOWER EXTREMITIES BILATERAL VENOUS   Final Result   1. INCOMPLETELY OCCLUSIVE THROMBI in the mid and distal LEFT femoral vein and   within the popliteal vein. They have developed in the interim since the   previous study from 11/17/2021.   2. No evidence of DVT in the right lower extremity. RECOMMENDATIONS:   Unavailable         NM LUNG VENT/PERFUSION (VQ)   Final Result   Negative for pulmonary embolus. XR CHEST 1 VIEW   Final Result   1. Mild cardiomegaly. There is no evidence of failure or pneumonia. CT HEAD WO CONTRAST   Final Result   No acute intracranial abnormality. MRI would be useful if symptoms persist.      Inflammatory changes of left greater than right mastoid air cells and left   sphenoid sinus similar to prior MRI. RECOMMENDATIONS:   Unavailable         CTA PULMONARY W CONTRAST   Final Result   Allowing for patient motion artifact, no identified pulmonary embolism.       Significant improvement in aeration of the lungs with some residual areas of presumed atelectasis in the right greater than left base. Cardiomegaly. RECOMMENDATIONS:   Unavailable         CT ABDOMEN PELVIS W IV CONTRAST Additional Contrast? None   Final Result   Thickening of the proximal ascending colon. Given the short-term change,   this is favored to represent a localized area of colitis likely infectious or   inflammatory. Neoplasia thought less likely but follow-up to resolution   would be recommended. Moderate stool in the distal colon. Minimal distention of several proximal small bowel loops likely incidental or   due to mild enteritis. Small hiatal hernia. Other chronic appearing findings. RECOMMENDATIONS:   Unavailable         IR INTERVENTIONAL RADIOLOGY PROCEDURE REQUEST    (Results Pending)       Assessment:  Principal Problem:    Acute respiratory failure with hypoxia (HCC)  Active Problems:    Hypoxia    Acute metabolic encephalopathy    Acute deep vein thrombosis (DVT) of femoral vein of left lower extremity (HCC)    Anemia    Positive blood culture  Resolved Problems:    * No resolved hospital problems. *      Plan:    1. Acute respiratory failure with hypoxia - oxygen now decreased to 1L NC intact - continue breathing treatments - no distress noted - critical care pulmonology following      2. Jania rectal lesion - circumferential discoloration with some mild lichenification - no surgical intervention at this time - possible biopsy at some point but will be up to GI as to when per surgery      3. Acute metabolic encephalopathy - now alert and oriented - follows simple commands - signs or symptoms of confusion noted       4. Acute kidney injury on chronic renal failure - BUN/creatinine remains unchanged - nephrology on - continue avoid all nephrotoxic medications      5.  Bacteremia - recent blood cultures preliminary negative  - ID following - continue IV Zosyn per ID's recommendation -  PICC line catheter to be placed today d/t hard to obtain access - CLEMENCIA negative for vegetation - patient will be discharged on oral Augmentin      6. Anemia - H&H stable post transfusion - EGD cancel d/t patient refused to have it done here wants to go to [facility] - GI on - on Folic acid and PPI      7. Left femoral and popliteal DVT - was on Eliquis - now on HOLD d/t GI bleed - S/p IVC filter by IR on 2/26/22     8. Proximal atrial fibrillation - currently sinus - holding anticoagulation d/t possible bleeding      9. COPD - 1L NC of oxygen - no new changes to current treatment regimen      10. Colitis - no diarrhea or abdominal pain at this time - GI on     11. Obstructive sleep apnea - wears BiPAP at HS      12. Hypothyroidism - Levothyroxine     13. Morbidly obesity - BMI > 40 - patient lives a sedentary lifestyle recommend lifestyle modification    14. History of a renal transplant - S/p renal transplant - continue anti rejection medication - renal following     NOTE: This report was transcribed using voice recognition software. Every effort was made to ensure accuracy; however, inadvertent computerized transcription errors may be present.      Electronically signed by KATINA Garcia CNP on 3/1/2022 at 7:42 AM

## 2022-03-01 NOTE — PROGRESS NOTES
Physical Therapy    Physical Therapy Treatment Note/Plan of Care    Room #:  6371/8362-35  Patient Name: Eitan Horowitz  YOB: 1955  MRN: [id number]    Date of Service: 3/1/2022     Tentative placement recommendation: [facility]   Equipment recommendation: Patient has needed equipment       Evaluating Physical Therapist: Dayna Ramirez  #[id number]      Specific Provider Orders/Date/Referring Provider :  02/25/22 0530   PT evaluation and treat Start: 02/25/22 0530, End: 02/25/22 0530, ONE TIME, Standing Count: 1 Occurrences, R    Order went unreviewed at transfer on Fri Feb 25, 2022 5:31 Olayinka Louise MD      Admitting Diagnosis:   Dehydration [E86.0]  Colitis [K52.9]  Hypoxia [R09.02]  Elevated troponin [R77.8]  Acute respiratory failure with hypoxia ([address] [J96.01]  Altered mental status, unspecified altered mental status type [R41.82]    Admitted with    altered mental status at snf with hypoxia  Surgery: none  Visit Diagnoses       Codes    Colitis     K52.9    Dehydration     E86.0    Elevated troponin     R77.8          Patient Active Problem List   Diagnosis    Peripheral vascular disease ([address]    Depression    Clotting disorder ([address]    Abnormal EKG    Cancer ([address]    Over weight    S/p cadaver renal transplant    ESRD (end stage renal disease) ([address]    Non morbid obesity due to excess calories    Hypertension    Leg swelling    Lymphedema of both lower extremities    Acute gout involving toe of right foot    Sepsis secondary to UTI ([address]    Acute kidney injury superimposed on CKD ([address]    Thyroid disease    Acute on chronic combined systolic (congestive) and diastolic (congestive) heart failure ([address]    Sepsis ([address]    Acute renal failure ([address]    CECILY (acute kidney injury) ([address]    Ischemic bowel disease ([address]    Altered mental status    Coma ([address]    Acute respiratory failure with hypoxia ([address]    Hypoxia    Acute metabolic encephalopathy    Acute deep vein thrombosis (DVT) of femoral vein of left lower extremity (HCC)    Anemia    Positive blood culture        ASSESSMENT of Current Deficits Patient exhibits decreased strength, balance, endurance, range of motion and coordination impairing functional mobility, transfers, tolerance to activity and participation confusion present. Patient only agreeable to supine exercises today d/t fatigue. Decreased strength, balance and endurance  increases patient's risk for fall. At end of session, LUE propped up on pillow. Patient needs continued PT to improve strength and endurance to tolerate and complete functional mobility with decreased assist required. PHYSICAL THERAPY  PLAN OF CARE       Physical therapy plan of care is established based on physician order,  patient diagnosis and clinical assessment    Current Treatment Recommendations:    -Bed Mobility: Lower extremity exercises , Upper extremity exercises  and Trunk control activities   -Sitting Balance: Hands on support to maintain midline , Facilitate active trunk muscle engagement  and Facilitate postural control in all planes   -Standing Balance: Perform strengthening exercises in standing to promote motor control with or without upper extremity support  and wt shifting and guided loading onto bilataerl lower ext  -Transfers: Provide instruction on proper hand and foot position for adequate transfer of weight onto lower extremities and use of gait device if needed, Cues for hand placement, technique and safety. Provide stabilization to prevent fall  and Support transfer of weight on to lower extremities  -Endurance: Utilize Supervised activities to increase level of endurance to allow for safe functional mobility including transfers and gait     PT long term treatment goals are located in below grid    Patient and or family understand(s) diagnosis, prognosis, and plan of care.     Frequency of treatments: Patient will be seen  daily. Prior Level of Function: Patient non ambulatory > 4 weeks  Rehab Potential: fair    for baseline    Past medical history:   Past Medical History:   Diagnosis Date    Abnormal EKG     left bundle branch block    Acute gout involving toe of right foot 2020    Acute on chronic combined systolic (congestive) and diastolic (congestive) heart failure (HCC)     Cancer (HCC)     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    Cerebrovascular disease     Clotting disorder ([facility] 1985    thrombophlebitis after c section delivery    Depression     15 years followed by dr Claudell Gilmore Hyperlipidemia     Hypertension     Hypothyroidism     Leg swelling 2020    Lymphedema of both lower extremities 2020    Peripheral vascular disease ([facility]     Renal failure 2012    renal transplant    Thrombophlebitis     after c section delivery    Thyroid disease      Past Surgical History:   Procedure Laterality Date     SECTION  1985    one normal delivery    COLECTOMY N/A 1/15/2022    DIAGNOSTIC  LAPAROSCOPY LYSIS OF ADHESIONS performed by Vincent Jose MD at [address] CATH LAB PROCEDURE      normal coronaries and  normal coronaries    DIALYSIS FISTULA CREATION  march and 2012    phase one and two patient will start dialysis when needed   [address]    transfemoral venous bypass grafts    IR IVC FILTER PLACEMENT W IMAGING  2022    IR IVC FILTER PLACEMENT W IMAGING 2022 [id number] [facility] SPECIAL PROCEDURES    KIDNEY TRANSPLANT  2016    KIDNEY TRANSPLANT  2015    KIDNEY TRANSPLANT  2015    PARATHYROIDECTOMY  2006    left parathyroid  implant and parathyroidectomy    TRANSESOPHAGEAL ECHOCARDIOGRAM N/A 2022    TRANSESOPHAGEAL ECHOCARDIOGRAM WITH BUBBLE STUDY performed by Trista House MD at [address] [place] [address]:    Precautions:  Up with assistance, falls, alarm and O2 ,  2 Liters of o2 via nasal cannula   Social history: Patient lives in a skilled nursing facility      Equipment owned: unknown,       [address]   How much difficulty turning over in bed?: A Lot  How much difficulty sitting down on / standing up from a chair with arms?: Unable  How much difficulty moving from lying on back to sitting on side of bed?: A Lot  How much help from another person moving to and from a bed to a chair?: Total  How much help from another person needed to walk in hospital room?: Total  How much help from another person for climbing 3-5 steps with a railing?: Total  AM-PAC Inpatient Mobility Raw Score : 8  AM-PAC Inpatient T-Scale Score : 28.52  Mobility Inpatient CMS 0-100% Score: 86.62  Mobility Inpatient CMS G-Code Modifier : CM    Nursing cleared patient for PT treatment. Patient only agreeable to supine exercises in bed. OBJECTIVE;   Initial Evaluation  Date: 2/25/2022 Treatment Date:  3/1/2022     Short Term/ Long Term   Goals   Was pt agreeable to Eval/treatment? Yes Yes  To be met in 5 days   Pain level   None reported None     Bed Mobility    Rolling: Maximal assist of  2    Supine to sit: Maximal assist of 1    Sit to supine: Maximal assist of 1    Scooting: Dependent assist of 1   Rolling: Not assessed    Supine to sit: Not assessed    Sit to supine: Not assessed    Scooting: Not assessed     Rolling: Moderate assist of 1    Supine to sit: Moderate assist of 1    Sit to supine: Moderate assist of 1    Scooting:  Moderate assist of 1     ROM Within functional limits  with exception of bilateral ankles  Increase range of motion 10% of affected joints    Strength LUE:  1/5   RUE 2/5  RLE:  2/5  LLE:  2/5  Increase strength in affected mm groups by 1/3 grade   Balance Sitting EOB:  poor lateral lean to right with inability to shift to left and sit midline    Sitting EOB: not assessed    Sitting EOB:  fair          Patient is Alert & Oriented x person and follows one step directions    Sensation:  not assessed   Edema:  yes bilateral lower extremities and left upper extremity   Endurance: poor      Vitals: room air   Blood Pressure at rest  Blood Pressure during session    Heart Rate at rest   Heart Rate during session     SPO2 at rest %  SPO2 during session %     Patient education  Patient educated on role of Physical Therapy, risks of immobility, safety and plan of care,  importance of mobility while in hospital , safety  and positioning for skin integrity and comfort     Patient response to education:   Pt verbalized understanding Pt demonstrated skill Pt requires further education in this area   Yes Partial Yes      Treatment:  Patient practiced and was instructed/facilitated in the following treatment: Patient   performed supine exercise AAROM UE/LE, repositioning of L UE onto pillow. Therapeutic Exercises:  ankle pumps, quad sets, glut sets, heel slide, hip abduction/adduction, straight leg raise, elbow flexion/extension, wrist flexion/extension and grasp/relax x 10-15 reps. At end of session, patient in bed with alarm and respiratory therapist present call light and phone within reach,  all lines and tubes intact, nursing notified. Patient would benefit from continued skilled Physical Therapy to improve functional independence and quality of life.          Patient's/ family goals   none stated    Time in  115  Time out  141    Total Treatment Time  26 minutes    CPT codes:    Therapeutic exercises (12197)   26 minutes  2 unit(s)    Daisha Villa PTA  [facility]#[id number]

## 2022-03-01 NOTE — PROGRESS NOTES
[address] [facility]    [address] [place] [address]  [place], [address]  Phone [phone]   [phone]      Admit Date: 2022  1:08 AM  Pt Name: Merlyn Lombardo  MRN: [id number]  : 1955  Reason for Consult:    Chief Complaint   Patient presents with    Altered Mental Status     hx of bipolar, Hx liver failure/renal failure, No history of AMS, no HX of dialysis, was hypoxic at [facility] 76% on room air was placed on 3L NC 93%. Requesting Physician:  Stuart Ramires DO  PCP: Manuelito Mcdaniel MD  History Obtained From:  patient, chart   ID consulted for Dehydration [E86.0]  Colitis [K52.9]  Hypoxia [R09.02]  Elevated troponin [R77.8]  Acute respiratory failure with hypoxia (HCC) [J96.01]  Altered mental status, unspecified altered mental status type [R41.82]  on hospital day 4   Face to face encounter   [facility]       Chief Complaint   Patient presents with    Altered Mental Status     hx of bipolar, Hx liver failure/renal failure, No history of AMS, no HX of dialysis, was hypoxic at [facility] 76% on room air was placed on 3L NC 93%. HISTORYOF PRESENT ILLNESS   Merlyn Lombardo is a 73 y.o. female who presents with   has a past medical history of Abnormal EKG, Acute gout involving toe of right foot, Acute on chronic combined systolic (congestive) and diastolic (congestive) heart failure ([address] Cancer ([address] Cerebrovascular disease, Clotting disorder ([address] Depression, Hyperlipidemia, Hypertension, Hypothyroidism, Leg swelling, Lymphedema of both lower extremities, Peripheral vascular disease ([address] Renal failure, Thrombophlebitis, and Thyroid disease. Altered Mental Status       Patient was admitted to hospital from nursing facility- she was hypoxic and brought to the ER for further evaluation. She is an immunocompromised patient - with history of renal transplant.    She is currently on Bipap- lethargic- receiving blood- chloride flush 0.9 % injection 5-40 mL, 5-40 mL, IntraVENous, 2 times per day, Jeovany Raspovic, DO, 10 mL at 02/28/22 2326    0.9 % sodium chloride infusion, 25 mL, IntraVENous, PRN, Tosha Abts Raspovic, DO    heparin flush 100 UNIT/ML injection 100 Units, 1 mL, IntraCATHeter, PRN, Tosha Abts Raspovic, DO    lidocaine PF 1 % injection 5 mL, 5 mL, IntraDERmal, Once, Merryl Rogue, DO    sodium chloride flush 0.9 % injection 5-40 mL, 5-40 mL, IntraVENous, 2 times per day, Jeovany Raspovic, DO, 10 mL at 02/28/22 2326    0.9 % sodium chloride infusion, 25 mL, IntraVENous, PRN, Tosha Abts Raspovic, DO    heparin flush 100 UNIT/ML injection 300 Units, 3 mL, IntraVENous, 2 times per day, Jeovany Raspovic, DO    heparin flush 100 UNIT/ML injection 300 Units, 3 mL, IntraCATHeter, PRN, Merryl Rogue, DO    vancomycin (VANCOCIN) intermittent dosing (placeholder), , Other, RX Placeholder, Joe Mercado MD    sodium chloride flush 0.9 % injection 5-40 mL, 5-40 mL, IntraVENous, PRN, KATINA Solomon - CNP, 10 mL at 02/27/22 2205    0.9 % sodium chloride infusion, 25 mL, IntraVENous, PRN, KATINA Solomon - CNP    heparin flush 100 UNIT/ML injection 100 Units, 1 mL, IntraCATHeter, PRN, Marlyn Martínez APRN - CNP    pantoprazole (PROTONIX) injection 40 mg, 40 mg, IntraVENous, BID, Gómez Gramajo DO, 40 mg at 02/28/22 2143    sodium chloride flush 0.9 % injection 5-40 mL, 5-40 mL, IntraVENous, PRN, Rhys Ramirez MD    0.9 % sodium chloride infusion, 25 mL, IntraVENous, PRN, Rhys Ramirez MD    ondansetron (ZOFRAN-ODT) disintegrating tablet 4 mg, 4 mg, Oral, Q8H PRN **OR** ondansetron (ZOFRAN) injection 4 mg, 4 mg, IntraVENous, Q6H PRN, Rhys Ramirez MD    acetaminophen (TYLENOL) tablet 650 mg, 650 mg, Oral, Q6H PRN, 650 mg at 02/27/22 0841 **OR** acetaminophen (TYLENOL) suppository 650 mg, 650 mg, Rectal, Q6H PRN, Giovanni Cardoso MD    ipratropium-albuterol (DUONEB) nebulizer solution 1 ampule, 1 ampule, Inhalation, 4x daily, Chase Jones MD, 1 ampule at 03/01/22 1030    allopurinol (ZYLOPRIM) tablet 100 mg, 100 mg, Oral, Daily, Susannah Ramirez MD, 100 mg at 03/01/22 0836    budesonide (PULMICORT) nebulizer suspension 500 mcg, 500 mcg, Nebulization, BID, Venus Ramirez MD, 500 mcg at 03/01/22 0622    cloZAPine (CLOZARIL) tablet 50 mg, 50 mg, Oral, Nightly, Susannah Ramirez MD, 50 mg at 02/28/22 2142    cycloSPORINE (SANDIMMUNE) capsule 100 mg, 100 mg, Oral, BID, Venus Ramirez MD, 100 mg at 03/01/22 1458    desvenlafaxine succinate (PRISTIQ) extended release tablet 50 mg, 50 mg, Oral, QPM, Susannah Ramirez MD, 50 mg at 02/28/22 2339    docusate sodium (COLACE) capsule 100 mg, 100 mg, Oral, BID, Chase Jones MD, 100 mg at 40/42/74 6883    folic acid (FOLVITE) tablet 1 mg, 1 mg, Oral, Daily, Chase Jones MD, 1 mg at 03/01/22 8208    levothyroxine (SYNTHROID) tablet 50 mcg, 50 mcg, Oral, Daily, Chase Jones MD, 50 mcg at 03/01/22 5946    predniSONE (DELTASONE) tablet 5 mg, 5 mg, Oral, Daily, Chase Jones MD, 5 mg at 03/01/22 0836    mycophenolate (CELLCEPT) capsule 1,000 mg, 1,000 mg, Oral, BID, Chase Jones MD, 1,000 mg at 03/01/22 0837    piperacillin-tazobactam (ZOSYN) 3,375 mg in dextrose 5 % 50 mL IVPB extended infusion (mini-bag), 3,375 mg, IntraVENous, Q8H, Nuzhat Wong MD, Stopped at 03/01/22 0645    0.9 % sodium chloride bolus, 25 mL, IntraVENous, Q8H, Nuzhat Wong MD, Stopped at 03/01/22 6319  ALLERGIES     Macrodantin [nitrofurantoin macrocrystal] and Sulfa antibiotics  Immunization History   Administered Date(s) Administered    COVID-19, Pfizer Purple top, DILUTE for use, 12+ yrs, 30mcg/0.3mL dose 01/28/2021, 02/18/2021, 08/18/2021      Internal Administration   First Dose COVID-19, Pfizer Purple top, DILUTE for use, 12+ yrs, 30mcg/0.3mL dose  01/28/2021   Second Dose COVID-19, Pfizer Purple top, DILUTE for use, 12+ yrs, 30mcg/0.3mL dose   2021       Last COVID Lab SARS-CoV-2 (no units)   Date Value   2021 Not Detected     SARS-CoV-2 Antibody, Total (no units)   Date Value   2022 Non-Reactive     SARS-CoV-2, PCR (no units)   Date Value   2022 Not Detected     SARS-CoV-2, NAAT (no units)   Date Value   2022 Not Detected        PAST MEDICAL HISTORY     Past Medical History:   Diagnosis Date    Abnormal EKG     left bundle branch block    Acute gout involving toe of right foot 2020    Acute on chronic combined systolic (congestive) and diastolic (congestive) heart failure (HCC)     Cancer (HCC)     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    Cerebrovascular disease     Clotting disorder ([address] 1985    thrombophlebitis after c section delivery    Depression     15 years followed by dr Michelle Magana Hyperlipidemia     Hypertension     Hypothyroidism     Leg swelling 2020    Lymphedema of both lower extremities 2020    Peripheral vascular disease ([address]     Renal failure 2012    renal transplant    Thrombophlebitis     after c section delivery    Thyroid disease      SURGICAL HISTORY       Past Surgical History:   Procedure Laterality Date     SECTION  1985    one normal delivery    COLECTOMY N/A 1/15/2022    DIAGNOSTIC  LAPAROSCOPY LYSIS OF ADHESIONS performed by Anastasiia Griffith MD at [address] [facility] [address] CATH LAB PROCEDURE      normal coronaries and  normal coronaries    DIALYSIS FISTULA CREATION  march and 2012    phase one and two patient will start dialysis when needed   [address]    transfemoral venous bypass grafts    IR IVC FILTER PLACEMENT W IMAGING  2022    IR IVC FILTER PLACEMENT W IMAGING 2022 [facility]ED[facility]    KIDNEY TRANSPLANT  2016    KIDNEY TRANSPLANT  2015    KIDNEY TRANSPLANT  2015    PARATHYROIDECTOMY  2006    left parathyroid  implant and parathyroidectomy    TRANSESOPHAGEAL ECHOCARDIOGRAM N/A 2/28/2022    TRANSESOPHAGEAL ECHOCARDIOGRAM WITH BUBBLE STUDY performed by Marilee Escobar MD at [facility]     ·   From nursing facility     PHYSICAL EXAM          Vitals:    03/01/22 0000 03/01/22 0153 03/01/22 0624 03/01/22 0830   BP: 126/82   133/66   Pulse: 101   90   Resp: 20 15 16 18   Temp: 97.9 °F (36.6 °C)   97.3 °F (36.3 °C)   TempSrc: Infrared   Infrared   SpO2: 100%   98%   Weight:       Height:         Physical Exam   Constitutional/General: awake and alert nad  Head: NC/AT  Eyes: PERRL, EOMI  Pulmonary: Lungs diminished to bases. ON RA off NC  Cardiovascular:  Regular rate and rhythm- distant. Abdomen: Soft, + BS.    distension. Nontender. Extremities: Warm and well perfused, + edema   Left arm with fistula-   Skin: Warm and dry    Gluteal area with small open areas noted , excoriation   Jania-rectal area with discoloration/    Neurologic:    No focal deficits  Psych: Normal Affect  Chin   PIV     DIAGNOSTIC RESULTS   RADIOLOGY:   CT HEAD WO CONTRAST    Result Date: 2/25/2022  EXAMINATION: CT OF THE HEAD WITHOUT CONTRAST  2/25/2022 3:08 am TECHNIQUE: CT of the head was performed without the administration of intravenous contrast. Dose modulation, iterative reconstruction, and/or weight based adjustment of the mA/kV was utilized to reduce the radiation dose to as low as reasonably achievable. COMPARISON: Correlation with MRI dated 01/16/2022 HISTORY: ORDERING SYSTEM PROVIDED HISTORY: mental status change TECHNOLOGIST PROVIDED HISTORY: Has a \"code stroke\" or \"stroke alert\" been called? ->No Reason for exam:->mental status change Decision Support Exception - unselect if not a suspected or confirmed emergency medical condition->Emergency Medical Condition (MA) FINDINGS: BRAIN/VENTRICLES: There is no acute intracranial hemorrhage, mass effect or midline shift. No abnormal extra-axial fluid collection.   The gray-white differentiation is maintained without evidence of an acute infarct. There is no evidence of hydrocephalus. Minimal cortical volume loss. Scattered vascular calcifications. ORBITS: The visualized portion of the orbits demonstrate no acute abnormality. SINUSES: Significant opacification of the left greater than right mastoid air cells similar to previous. Mild mucosal thickening in the left sphenoid sinus. This was also present previously. SOFT TISSUES/SKULL:  No acute abnormality of the visualized skull or soft tissues. No acute intracranial abnormality. MRI would be useful if symptoms persist. Inflammatory changes of left greater than right mastoid air cells and left sphenoid sinus similar to prior MRI. RECOMMENDATIONS: Unavailable     NM LUNG VENT/PERFUSION (VQ)    Result Date: 2/25/2022  EXAMINATION: NUCLEAR MEDICINE VENTILATION PERFUSION SCAN. 2/25/2022 TECHNIQUE: 3.5 millicuries aerosolized Tc99m DTPA was administered via mask prior to planar imaging of the lungs in multiple projections. Then, 4.2 millicuries of Tc 17D MAA was administered intravenously prior to planar imaging of the lungs in similar projections. COMPARISON: Chest CT February 25, 2022 . HISTORY: ORDERING SYSTEM PROVIDED HISTORY: R/O PE TECHNOLOGIST PROVIDED HISTORY: Reason for exam:->R/O PE What reading provider will be dictating this exam?->CRC FINDINGS: PERFUSION: Distribution of radiotracer is homogeneous. No segmental defects identified. VENTILATION: Ventilation images are unremarkable. CHEST CT: Small infiltrate or atelectasis posterior right lung. Negative for pulmonary embolus. CT ABDOMEN PELVIS W IV CONTRAST Additional Contrast? None    Result Date: 2/25/2022  EXAMINATION: CT OF THE ABDOMEN AND PELVIS WITH CONTRAST 2/25/2022 3:08 am TECHNIQUE: CT of the abdomen and pelvis was performed with the administration of intravenous contrast. Multiplanar reformatted images are provided for review.  Dose modulation, iterative reconstruction, and/or weight based adjustment of the mA/kV was utilized to reduce the radiation dose to as low as reasonably achievable. COMPARISON: 01/17/2022 HISTORY: ORDERING SYSTEM PROVIDED HISTORY: Abdominal distention, AMS TECHNOLOGIST PROVIDED HISTORY: Reason for exam:->Abdominal distention, AMS Additional Contrast?->None Decision Support Exception - unselect if not a suspected or confirmed emergency medical condition->Emergency Medical Condition (MA) FINDINGS: Many images are partially degraded by patient motion related artifact. Low-attenuation focus in the central aspect of the left lobe of the liver as well as a tiny low-attenuation focus in the caudate lobe. There is also a tiny low-attenuation focus in the extreme inferior aspect of the right lobe. These are difficult to definitively characterize due to their small size but likely represent small hepatic cysts. Gallbladder is unremarkable. Spleen is normal in size. No evidence of acute pancreatitis. There is a E small exophytic cystic appearing lesion along the anterior aspect of the body of the pancreas measuring 7 Hounsfield units and 1.4 cm. This is unchanged dating back to 11/17/2021. This is also favored to be incidental but could be followed. No adrenal mass. No hydronephrosis. The native kidneys remains significantly atrophic and contain numerous cystic lesions. Some of these are hyperdense but also unchanged dating back to the 11/17/2021 exam and favored to represent hyperdense or hemorrhagic cysts. Small renal cortical calcifications and or nonobstructing stones. A right lower quadrant renal transplant demonstrates no hydronephrosis. Small hiatal hernia. Mild fluid distention of a few small bowel loops in the upper abdomen. No bowel obstruction. The cecum is mobile. The appendix is not identified with certainty. There is some localized thickening of the proximal ascending colon. This does appear to be new compared to the previous examination.   Moderate stool in the distal colon. Residual contrast in the colon likely due to contrast administered on the prior examination. Uterus is atrophic. Urinary bladder is largely decompressed with Chin catheter. Minimal gas in the bladder likely due to the catheter. Partial visualization of femoral to femoral bypass graft. Degenerative changes are present in the spine and pelvis. Please see separate dictated report for CT of the chest.     Thickening of the proximal ascending colon. Given the short-term change, this is favored to represent a localized area of colitis likely infectious or inflammatory. Neoplasia thought less likely but follow-up to resolution would be recommended. Moderate stool in the distal colon. Minimal distention of several proximal small bowel loops likely incidental or due to mild enteritis. Small hiatal hernia. Other chronic appearing findings. RECOMMENDATIONS: Unavailable     IR GUIDED IVC FILTER PLACEMENT    Result Date: 2/26/2022  EXAMINATION: INFERIOR VENA CAVA (IVC) FILTER INSERTION 2/26/2022 Procedural Personnel: Naida Smith MD HISTORY: Indication: Bleeding with IV heparin ORDERING SYSTEM PROVIDED HISTORY: IVC filter TECHNOLOGIST PROVIDED HISTORY: Reason for exam:->IVC filter Anticoagulation contraindicated SEDATION: None CONTRAST: Contrast agent: Isovue 320 Contrast volume: 30mL FLUOROSCOPY DOSE AND TYPE OR TIME AND EXPOSURES: Fluoroscopy time/Number of Images: Fluoroscopy time 1.5 minutes. Reference [facility] kerma: [id number] PROCEDURE: Informed consent for the procedure including risks, benefits and alternatives was obtained and time-out was performed prior to the procedure. Universal protocol was followed. A suitable skin site was prepared and draped using all elements of maximal sterile barrier technique including sterile gloves, sterile gown, cap, mask, large sterile sheet, sterile ultrasound probe cover, hand hygiene, and cutaneous antisepsis.   Time-out was called and the patient's order and identity were verified. Local anesthesia was administered. The vessel was sonographically evaluated and judged appropriate for access. Real time ultrasound was used to visualize needle entry into the vessel and an ultrasound image was stored in PACS. Vein accessed: Right common femoral vein. Access technique: Micropuncture set with 21 gauge needle A 0.035 guide wire was used to place a catheter into the inferior vena cava. A vena cavogram was performed. A Bard [place] filter was deployed in routine fashion in the infrarenal IVC under fluoroscopic guidance. The sheath was removed and hemostasis was achieved with manual compression. A sterile dressing was applied. Patient tolerated procedure well. Complications: No immediate complications. Standardized report: [id number]_[email or web address] [id number] FINDINGS: US: The access vein is patent. Inferior Vena Cavogram: The vena cava is patent and normal in size without thrombus or anomalies. Post-placement imaging: Spot filmdemonstrates the filter in the infrarenalvena cava with less than 15 degrees tilt from the vena cava access. Successful vena cava filter placement. PLAN: Retrieval intent (MIPS): The filter is potentially retrievable. Plan/Timing For Retrieval: Ordering Physician/Contact For Retrieval Plan: Nash Martini     XR CHEST 1 VIEW    Result Date: 2/25/2022  EXAMINATION: ONE XRAY VIEW OF THE CHEST 2/25/2022 2:49 pm COMPARISON: None. HISTORY: ORDERING SYSTEM PROVIDED HISTORY: needs to be done with VQ scan TECHNOLOGIST PROVIDED HISTORY: Last chest Xray done more than 24 hours ago Reason for exam:->needs to be done with VQ scan FINDINGS: The heart is mildly enlarged. There are no findings of failure. The lungs are clear. There is no focal infiltrate or effusion. .     1. Mild cardiomegaly. There is no evidence of failure or pneumonia.      CTA PULMONARY W CONTRAST    Result Date: 2/25/2022  EXAMINATION: CTA OF THE CHEST 2/25/2022 3:08 am TECHNIQUE: CTA of the chest was performed after the administration of intravenous contrast.  Multiplanar reformatted images are provided for review. MIP images are provided for review. Dose modulation, iterative reconstruction, and/or weight based adjustment of the mA/kV was utilized to reduce the radiation dose to as low as reasonably achievable. COMPARISON: Portable chest dated 01/22/2022 and CT dated 01/17/2022 HISTORY: ORDERING SYSTEM PROVIDED HISTORY: Hypoxic, SOB TECHNOLOGIST PROVIDED HISTORY: Reason for exam:->Hypoxic, SOB Decision Support Exception - unselect if not a suspected or confirmed emergency medical condition->Emergency Medical Condition (MA) FINDINGS: Pulmonary Arteries: Assessment is limited due to patient motion artifact. A small or peripheral embolism would be difficult to exclude but no large or central embolism identified. Mediastinum: Moderately severe cardiomegaly is similar to previous. No pericardial effusion. No aortic dissection. Scattered vascular calcifications. Normal-size lymph nodes without adenopathy by size criteria. Lungs/pleura: No pneumothorax. Mild atelectasis in the right greater than left lung base. Otherwise, there has been significant improvement in aeration of the lungs since previous. Upper Abdomen: Partial visualization of the upper right kidney with cystic appearing foci as well as a partially visualized hyperdense lesion likely representing a hyperdense proteinaceous cyst and similar to previous but continued follow-up would be useful. Visualized portions of the upper abdomen demonstrate no acute abnormality. Small hiatal hernia. Soft Tissues/Bones: Degenerative changes are scattered in the spine. Allowing for patient motion artifact, no identified pulmonary embolism. Significant improvement in aeration of the lungs with some residual areas of presumed atelectasis in the right greater than left base. Cardiomegaly.  RECOMMENDATIONS: Unavailable     IR ULTRASOUND GUIDANCE VASCULAR ACCESS    Result Date: 2/26/2022  EXAMINATION: INFERIOR VENA CAVA (IVC) FILTER INSERTION 2/26/2022 Procedural Personnel: Ayanna Cuba MD HISTORY: Indication: Bleeding with IV heparin ORDERING SYSTEM PROVIDED HISTORY: IVC filter TECHNOLOGIST PROVIDED HISTORY: Reason for exam:->IVC filter Anticoagulation contraindicated SEDATION: None CONTRAST: Contrast agent: Isovue 320 Contrast volume: 30mL FLUOROSCOPY DOSE AND TYPE OR TIME AND EXPOSURES: Fluoroscopy time/Number of Images: Fluoroscopy time 1.5 minutes. Reference air [place] kerma: [id number] PROCEDURE: Informed consent for the procedure including risks, benefits and alternatives was obtained and time-out was performed prior to the procedure. Universal protocol was followed. A suitable skin site was prepared and draped using all elements of maximal sterile barrier technique including sterile gloves, sterile gown, cap, mask, large sterile sheet, sterile ultrasound probe cover, hand hygiene, and cutaneous antisepsis. Time-out was called and the patient's order and identity were verified. Local anesthesia was administered. The vessel was sonographically evaluated and judged appropriate for access. Real time ultrasound was used to visualize needle entry into the vessel and an ultrasound image was stored in PACS. Vein accessed: Right common femoral vein. Access technique: Micropuncture set with 21 gauge needle A 0.035 guide wire was used to place a catheter into the inferior vena cava. A vena cavogram was performed. A Bard [place] filter was deployed in routine fashion in the infrarenal IVC under fluoroscopic guidance. The sheath was removed and hemostasis was achieved with manual compression. A sterile dressing was applied. Patient tolerated procedure well. Complications: No immediate complications. Standardized report: [id number]_[email or web address] [id number] FINDINGS: US: The access vein is patent.  Inferior Vena Cavogram: The vena cava is patent and normal in size without thrombus or anomalies. Post-placement imaging: Spot filmdemonstrates the filter in the infrarenalvena cava with less than 15 degrees tilt from the vena cava access. Successful vena cava filter placement. PLAN: Retrieval intent (MIPS): The filter is potentially retrievable. Plan/Timing For Retrieval: Ordering Physician/Contact For Retrieval Plan: Scarlet Murguia     FL MODIFIED BARIUM SWALLOW W VIDEO    Result Date: 2/26/2022  EXAMINATION: MODIFIED BARIUM SWALLOW WAS PERFORMED IN CONJUNCTION WITH SPEECH PATHOLOGY SERVICES TECHNIQUE: Fluoroscopic evaluation of the swallowing mechanism was performed using cineradiography with multiple consistency of barium product in conjunction with speech pathology services. FLUOROSCOPY DOSE AND TYPE OR TIME AND EXPOSURES: Fluoroscopy time 2.1 minutes. Air kerma 5.49 mGy COMPARISON: None HISTORY: ORDERING SYSTEM PROVIDED HISTORY: dysphagia, aspriation pna? TECHNOLOGIST PROVIDED HISTORY: Reason for exam:->dysphagia, aspriation pna? FINDINGS: There was oral delay and pharyngeal delay. Laryngeal elevation was adequate. There was retention in the piriform sinuses but not the vallecula. Transient laryngeal penetration is seen with thin liquid barium. Otherwise, no aspiration or laryngeal penetration is seen. Strands it laryngeal penetration with thin liquid barium. No evidence of aspiration. Please see separate speech pathology report for full discussion of findings and recommendations. RECOMMENDATIONS: Unavailable     US DUP LOWER EXTREMITIES BILATERAL VENOUS    Result Date: 2/25/2022  EXAMINATION: DUPLEX VENOUS ULTRASOUND OF THE BILATERAL LOWER EXTREMITIES2/25/2022 2:47 pm TECHNIQUE: Duplex ultrasound using B-mode/gray scaled imaging, Doppler spectral analysis and color flow Doppler was obtained of the deep venous structures of the lower bilateral extremities. COMPARISON: None.  HISTORY: ORDERING SYSTEM PROVIDED HISTORY: R/O DVT TECHNOLOGIST PROVIDED HISTORY: Reason for exam:->R/O DVT What reading provider will be dictating this exam?->CRC FINDINGS: Right lower extremity:  The visualized veins of the bilateral lower extremities are patent and free of echogenic thrombus. The veins demonstrate good compressibility with normal color flow study and spectral analysis. Left lower extremity: There are incompletely occlusive thrombi within the mid and distal left femoral vein and the popliteal vein. They have developed in the interim since the prior lower extremity Doppler from 11/17/2021.     1. INCOMPLETELY OCCLUSIVE THROMBI in the mid and distal LEFT femoral vein and within the popliteal vein. They have developed in the interim since the previous study from 11/17/2021. 2. No evidence of DVT in the right lower extremity. RECOMMENDATIONS: Unavailable     LABS  Recent Labs     02/27/22  0344 02/27/22  0953 02/27/22  1602 02/27/22  2241 02/28/22  1350 02/28/22  1350 02/28/22  2307 03/01/22  0527 03/01/22  1101   WBC 5.6  --   --   --  5.5  --   --  5.8  --    HGB 7.1*   < > 8.4*   < > 9.0*   < > 9.1* 8.6* 8.8*   HCT 25.5*  --  28.6*  --  30.8*  --   --  29.3*  --    .9*  --   --   --  109.6*  --   --  108.5*  --      --   --   --  127*  --   --  135  --     < > = values in this interval not displayed. Recent Labs     02/27/22  0344 02/27/22  0344 02/28/22  1350 02/28/22  1350 03/01/22  0527     --  138  --  139   K 4.0   < > 4.3   < > 4.1      < > 106   < > 108*   CO2 21*   < > 23   < > 21*   BUN 31*  --  25*  --  25*   CREATININE 1.7*  --  1.6*  --  1.6*   GFRAA 36  --  39  --  39   LABGLOM 30  --  32  --  32   GLUCOSE 100*  --  81  --  80   PROT 5.0*  --  5.2*  --  5.1*   LABALBU 2.2*  --  2.5*  --  2.6*   CALCIUM 9.1  --  9.6  --  9.7   BILITOT 0.7  --  0.6  --  0.6   ALKPHOS 72  --  75  --  72   AST 11  --  12  --  10   ALT 6  --  8  --  8    < > = values in this interval not displayed.      Recent Labs 02/27/22  1603   PROCAL 0.36*     Lab Results   Component Value Date    CRP 18.3 (H) 01/23/2022    CRP 34.6 (H) 11/17/2021     Lab Results   Component Value Date    SEDRATE 61 (H) 01/23/2022    SEDRATE 62 (H) 11/20/2021     No results found for: [id number]  Lab Results   Component Value Date    COVID19 Not Detected 02/25/2022     COVID-19/MARINA-COV2 LABS  Recent Labs     02/27/22  0344 02/27/22  1603 02/28/22  1350 03/01/22  0527   PROCAL  --  0.36*  --   --    INR  --   --   --  1.1   PROTIME  --   --   --  12.4   AST 11  --  12 10   ALT 6  --  8 8     MICROBIOLOGY:       Lab Results   Component Value Date    BC 24 Hours no growth 02/27/2022    BC  02/25/2022     24 Hours no growth  Gram stain performed from blood culture bottle media  Gram positive cocci in [facility]  02/25/2022     This organism was isolated in one set. Susceptibility testing is not routinely done as this  organism frequently represents skin contamination. Additional testing can be ordered by calling the  Microbiology Department.       ORG Staphylococcus coagulase-negative 02/25/2022    ORG Enterococcus faecalis 01/12/2022    ORG Klebsiella pneumoniae ssp pneumoniae 11/17/2021    ORG Staphylococcus coagulase-negative 11/17/2021    ORG Klebsiella pneumoniae ssp pneumoniae 11/17/2021     Lab Results   Component Value Date    BLOODCULT2 24 Hours no growth 02/25/2022    BLOODCULT2 5 Days no growth 01/24/2022    BLOODCULT2 5 Days no growth 01/21/2022    ORG Staphylococcus coagulase-negative 02/25/2022    ORG Enterococcus faecalis 01/12/2022    ORG Klebsiella pneumoniae ssp pneumoniae 11/17/2021    ORG Staphylococcus coagulase-negative 11/17/2021    ORG Klebsiella pneumoniae ssp pneumoniae 11/17/2021     WOUND/ABSCESS   Date Value Ref Range Status   01/24/2022 Growth not present  Final     Urine Culture, Routine   Date Value Ref Range Status   02/26/2022 Growth not present  Final   01/23/2022 Growth not present  Final   01/12/2022 >100,000 CFU/ml Final     MRSA Culture Only   Date Value Ref Range Status   11/17/2021 Methicillin resistant Staph aureus not isolated  Final     FINAL IMPRESSION    Patient is a 73 y.o. female who presented with   Chief Complaint   Patient presents with    Altered Mental Status     hx of bipolar, Hx liver failure/renal failure, No history of AMS, no HX of dialysis, was hypoxic at [facility] 76% on room air was placed on 3L NC 93%. and admitted for Dehydration [E86.0]  Colitis [K52.9]  Hypoxia [R09.02]  Elevated troponin [R77.8]  Acute respiratory failure with hypoxia (HCC) [J96.01]  Altered mental status, unspecified altered mental status type [R41.82]   Chart reviewed respiratory failure h/o covid Aspiration pneumonia  cons bacteremia ? contaminant rosibel neg   ckd/kidney transplant on immunosuppressives  Gi seeing for colitis/anemia  Pulm following  Left le dvt/thrombus s/p IVCF   Perianal lesion? Surgery eval ?    · History of vena cava filter and AV fistula   · Klebsiella and CONS bacteremia in 11/17/2021  · Coccyx wound? PERIRECTAL LESION    Plan:    piperacillin-tazobactam (ZOSYN) 3,375 mg in dextrose 5 % 50 mL IVPB extended infusion (mini-bag), Q8H    · Vancomycin per pharamcy   · Check procal 0.36  · Repeat blood cultures-   · ROSIBEL  Noted neg   · Check fungitell   · General surgery to evaluate mckenzie-rectal area FOLLOWING  · Request records from [facility]  · Can d/c on Augmentin cover poss aspiration colitis  ·     Imaging and labs were reviewed. Thank you for involving me in the care of Kuldeep Zimmer. Please do not hesitate to call for any questions or concerns.     Electronically signed by Olga Smith MD on 3/1/2022 at 11:36 AM

## 2022-03-01 NOTE — PROGRESS NOTES
Assessment and Plan  Patient is a 73 y.o. female with the following medical Problems:   1. Acute on chronic hypoxic and hypercapnic respiratory failure  2. Atelectasis  3. Aspiration pneumonitis  4. DVT (S/P IVC filter)  5. Morbid obesity with obesity hypoventilation syndrome  6. Obstructive sleep apnea  7. COPD without exacerbation  8. History of kidney transplant  9. CKD  10. Bipolar disorder    Plan of CARE:  1. Supplemental oxygen to keep sat 88 to 94%  2. BiPAP 12/6 with 12 backup rate as needed and nightly. Patient used BiPAP overnight. 3.  Follow-up speech therapy recommendations  4. Resume Eliquis as IVC filter is noted 100% protective against PE. This was discussed with primary MD (Jaspal Lucio)  5. Incentive spirometer  6. Avoid benzodiazepine and opioids  7. Aspiration precaution and head of bed elevation at 30 degrees  8. ID is managing antibiotics      Admission history:  Patient is a poor historian. History is mostly obtained from the chart since the patient is confused. Patient is a 79-year-old woman with above-mentioned medical problems who presented to [facility] emergency room on February 25, 2021 with confusion. Patient was found to be hypoxemic on room air saturating 76%. She was placed on 3 L nasal cannula. Denies fever, chills, rigors, and chest pain. Daily progress:  March 1, 2022: Patient was seen this morning and has no complaints. She is currently on 1-2 L nasal cannula. She has no fever, chills, rigors. She is slightly confused however she is more conversant and interactive than when seen last time. Patient has been off anticoagulation due to concern of anemia however she has no evidence of active bleeding. Eliquis was resumed today and this was discussed with her primary physician.     Past Medical History:  Past Medical History:   Diagnosis Date    Abnormal EKG     left bundle branch block    Acute gout involving toe of right foot 09/03/2020    Acute on chronic combined systolic (congestive) and diastolic (congestive) heart failure (HCC)     Cancer (HCC)     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    Cerebrovascular disease     Clotting disorder ([facility] 1985    thrombophlebitis after c section delivery    Depression     15 years followed by dr Kayy Painter    Hyperlipidemia     Hypertension     Hypothyroidism     Leg swelling 2020    Lymphedema of both lower extremities 2020    Peripheral vascular disease ([facility] [address] [facility]     Renal failure 2012    renal transplant    Thrombophlebitis     after c section delivery    Thyroid disease         Family History:   Family History   Problem Relation Age of Onset    Diabetes Mother     Diabetes Father     Dementia Father        Allergies:         Macrodantin [nitrofurantoin macrocrystal] and Sulfa antibiotics    Social history:  Social History     Socioeconomic History    Marital status:      Spouse name: Not on file    Number of children: Not on file    Years of education: Not on file    Highest education level: Not on file   Occupational History    Not on file   Tobacco Use    Smoking status: Former Smoker     Packs/day: 1.00     Years: 20.00     Pack years: 20.00     Quit date: 10/1/2015     Years since quittin.4    Smokeless tobacco: Never Used   Vaping Use    Vaping Use: Never used   Substance and Sexual Activity    Alcohol use: No     Comment: 2 cups coffee per day    Drug use: No    Sexual activity: Not on file   Other Topics Concern    Not on file   Social History Narrative    Not on file     Social Determinants of Health     Financial Resource Strain:     Difficulty of Paying Living Expenses: Not on file   Food Insecurity:     Worried About Running Out of Food in the Last Year: Not on file    Vannessa of Food in the Last Year: Not on file   Transportation Needs:     Lack of Transportation (Medical): Not on file    Lack of Transportation (Non-Medical):  Not on file Physical Activity:     Days of Exercise per Week: Not on file    Minutes of Exercise per Session: Not on file   Stress:     Feeling of Stress : Not on file   Social Connections:     Frequency of Communication with Friends and Family: Not on file    Frequency of Social Gatherings with Friends and Family: Not on file    Attends Confucianism Services: Not on file    Active Member of [address] [place] [address] or Organizations: Not on file    Attends Club or Organization Meetings: Not on file    Marital Status: Not on file   Intimate Partner Violence:     Fear of Current or Ex-Partner: Not on file    Emotionally Abused: Not on file    Physically Abused: Not on file    Sexually Abused: Not on file   Housing Stability:     Unable to Pay for Housing in the Last Year: Not on file    Number of [place] in the Last Year: Not on file    Unstable Housing in the Last Year: Not on file       Current Medications:     Current Facility-Administered Medications:     lactated ringers infusion, , IntraVENous, Continuous, Jeovany Hankins,     sodium chloride flush 0.9 % injection 5-40 mL, 5-40 mL, IntraVENous, 2 times per day, Jeovany Hankins DO, 10 mL at 02/28/22 2326    0.9 % sodium chloride infusion, 25 mL, IntraVENous, PRN, Yesy Hankins, DO    heparin flush 100 UNIT/ML injection 100 Units, 1 mL, IntraCATHeter, PRN, Yesy Hankins, DO    lidocaine PF 1 % injection 5 mL, 5 mL, IntraDERmal, Once, [employer], DO    sodium chloride flush 0.9 % injection 5-40 mL, 5-40 mL, IntraVENous, 2 times per day, Jeovany Hankins DO, 10 mL at 02/28/22 2326    0.9 % sodium chloride infusion, 25 mL, IntraVENous, PRN, Yesy Hankins, DO    heparin flush 100 UNIT/ML injection 300 Units, 3 mL, IntraVENous, 2 times per day, Jeovany Hankins DO    heparin flush 100 UNIT/ML injection 300 Units, 3 mL, IntraCATHeter, PRN, [employer], DO    vancomycin (VANCOCIN) intermittent dosing (placeholder), , Other, RX Matt Schaeffer MD    sodium chloride flush 0.9 % injection 5-40 mL, 5-40 mL, IntraVENous, PRN, Zorita Sable, APRN - CNP, 10 mL at 02/27/22 2205    0.9 % sodium chloride infusion, 25 mL, IntraVENous, PRN, Zorita Sable, APRN - CNP    heparin flush 100 UNIT/ML injection 100 Units, 1 mL, IntraCATHeter, PRN, Zorita Sable, APRN - CNP    pantoprazole (PROTONIX) injection 40 mg, 40 mg, IntraVENous, BID, Taryn Bennett, , 40 mg at 02/28/22 2143    sodium chloride flush 0.9 % injection 5-40 mL, 5-40 mL, IntraVENous, PRN, Merline Rudd Mikhail, MD    0.9 % sodium chloride infusion, 25 mL, IntraVENous, PRN, Merline Rudd Mikhail, MD    ondansetron (ZOFRAN-ODT) disintegrating tablet 4 mg, 4 mg, Oral, Q8H PRN **OR** ondansetron (ZOFRAN) injection 4 mg, 4 mg, IntraVENous, Q6H PRN, Merline Rudd Mikhail, MD    polyethylene glycol (GLYCOLAX) packet 17 g, 17 g, Oral, Daily PRN, Sybil Pereyra MD    acetaminophen (TYLENOL) tablet 650 mg, 650 mg, Oral, Q6H PRN, 650 mg at 02/27/22 0841 **OR** acetaminophen (TYLENOL) suppository 650 mg, 650 mg, Rectal, Q6H PRN, Merline Rudd Mikhail, MD    ipratropium-albuterol (DUONEB) nebulizer solution 1 ampule, 1 ampule, Inhalation, 4x daily, Sybil Pereyra MD, 1 ampule at 03/01/22 0622    allopurinol (ZYLOPRIM) tablet 100 mg, 100 mg, Oral, Daily, Susannah Ramirez MD, 100 mg at 03/01/22 0836    budesonide (PULMICORT) nebulizer suspension 500 mcg, 500 mcg, Nebulization, BID, Merline Rudd Mikhail, MD, 500 mcg at 03/01/22 0622    cloZAPine (CLOZARIL) tablet 50 mg, 50 mg, Oral, Nightly, Susannah Ramirez MD, 50 mg at 02/28/22 2142    cycloSPORINE (SANDIMMUNE) capsule 100 mg, 100 mg, Oral, BID, Susannah Ramirez MD, 100 mg at 03/01/22 0836    desvenlafaxine succinate (PRISTIQ) extended release tablet 50 mg, 50 mg, Oral, QPM, Susannah Ramirez MD, 50 mg at 02/28/22 2339    docusate sodium (COLACE) capsule 100 mg, 100 mg, Oral, BID, Merline Rudd Mikhail, MD, 100 mg at 36/44/59 3685    folic acid (Marlys Loron) tablet 1 mg, 1 mg, Oral, Daily, Ayanna Ramirez MD, 1 mg at 03/01/22 8611    levothyroxine (SYNTHROID) tablet 50 mcg, 50 mcg, Oral, Daily, Juan Mesa MD, 50 mcg at 03/01/22 1335    predniSONE (DELTASONE) tablet 5 mg, 5 mg, Oral, Daily, Juan Mesa MD, 5 mg at 03/01/22 0836    mycophenolate (CELLCEPT) capsule 1,000 mg, 1,000 mg, Oral, BID, Juan Mesa MD, 1,000 mg at 03/01/22 0837    piperacillin-tazobactam (ZOSYN) 3,375 mg in dextrose 5 % 50 mL IVPB extended infusion (mini-bag), 3,375 mg, IntraVENous, Q8H, Casa Rondon MD, Stopped at 03/01/22 0645    0.9 % sodium chloride bolus, 25 mL, IntraVENous, Q8H, Casa Rondon MD, Stopped at 03/01/22 7031      Review of Systems:   General: denies weight gain, denies loss of appetite, fever, chills, night sweats. HEENT: denies headaches, dizziness, head trauma, visual changes, eye pain, tinnitus, nosebleeds, hoarseness or throat pain    Respiratory: denies chest pain, dyspnea, cough and hemoptysis  Cardiovascular: denies orthopnea, paroxysmal nocturnal dyspnea, leg swelling, and previous heart attack. Gastrointestinal: denies pain, nausea vomiting, diarrhea, constipation, melena or bleeding. Genitourinary: denies hematuria, frequency, urgency or dysuria  Neurology: denies syncope, seizures, paralysis, paraesthesia   Endocrine: denies polyuria, polydipsia, skin or hair changes, and heat or cold intolerance  Musculoskeletal: denies joint pain, swelling, arthritis or myalgia  Hematologic: denies bleeding, adenopathy and easy bruising  Skin: denies rashes and skin discoloration  Psychiatry: denies depression    Physical Exam:   Vital Signs:  /66   Pulse 90   Temp 97.3 °F (36.3 °C) (Infrared)   Resp 18   Ht 5' 2\" (1.575 m)   Wt 220 lb (99.8 kg)   SpO2 98%   BMI 40.24 kg/m²     Input/Output: In: 1137.7 [P.O.:350;  I.V.:746.7]  Out: 950     Oxygen requirements: NC      General appearance: Obese, not in pain or distress, in no respiratory distress    HEENT: Atraumatic/normocephalic, EOMI, CJ, pharynx clear, moist mucosa, redness of the uvula appreciated,   Neck: Supple, no jugular venous distension, lymphadenopathy, thyromegaly or carotid bruits  Chest: Decreased breath sounds, no wheezing, no crackles and no tenderness over ribs   Cardiovascular: Normal S1, S2, regular rate and rhythm, no murmur, rub or gallop  Abdomen: Normal sounds present, soft, lax with no tenderness, no hepatosplenomegaly, and no masses  Extremities: No edema. Pulses are equally present. Skin: intact, no rashes   Neurologic: Alert and oriented x 2, No focal deficit     Investigations:  Labs, radiological imaging and cardiac work up were reviewed        STAFF PHYSICIAN NOTE OF PERSONAL INVOLVEMENT IN CARE  As the attending physician, I certify that I personally reviewed the patient's history and personally examined the patient to confirm the physical findings described above, and that I reviewed the relevant imaging studies and available reports. I also discussed the differential diagnosis and all of the proposed management plans with the patient and individuals accompanying the patient to this visit. They had the opportunity to ask questions about the proposed management plans and to have those questions answered.       Electronically signed by Khadra Tan MD on 3/1/2022 at 9:29 AM

## 2022-03-01 NOTE — PROGRESS NOTES
Pharmacy Consultation Note  (Antibiotic Dosing and Monitoring)    Initial consult date: 22  Consulting physician: Dr Micki Magana  Drug(s): Vancomycin IV  Indication: Bacteremia    Ht Readings from Last 1 Encounters:   22 5' 2\" (1.575 m)     Wt Readings from Last 1 Encounters:   22 220 lb (99.8 kg)       Age/  Gender Act BW IBW DW  Allergy Information   73 y.o.     female 99.8 kg 50.1 kg 70 kg  Macrodantin [nitrofurantoin macrocrystal] and Sulfa antibiotics                 Date  WBC BUN/CR UOP Drug/Dose Time   Given Level(s)   (Time) Comments     (#1) 5.6 31/1.7 -- Vancomycin 1500 mg IV x 1 1725       (#2) 5.5 25/1.6 0.2 Vancomycin 1000 mg IV x 1 8 Random @ 2777 = 17.7 mcg/mL Pharmacy consulted to dose   3/1  (#3) 5.8 25/1.6 0.4 --- -- Random @ 1958 = 24 mcg/mL      (#4)            (#5)            (#6)            (#7)            Estimated Creatinine Clearance: 39 mL/min (A) (based on SCr of 1.6 mg/dL (H)). UOP over the past 24 hours:       Intake/Output Summary (Last 24 hours) at 3/1/2022 1153  Last data filed at 3/1/2022 0847  Gross per 24 hour   Intake 935.42 ml   Output 950 ml   Net -14.58 ml       Temp max: Temp (24hrs), Av.1 °F (36.2 °C), Min:96 °F (35.6 °C), Max:97.9 °F (36.6 °C)      Antibiotic Regimen:  Antibiotic Dose Date Initiated   Zosyn 3.375 g IV Q8H      Cultures:  available culture and sensitivity results were reviewed in EPIC  Cultures sent and are pending. Culture Date Result    Blood cx #1  CoNS   Blood cx #2  NGTD   Covid-19  Not detected   Respiratory cx  Negative   Rapid Flu  Negative   Stool cx  Negative   Urine cx  NGTD   Blood cx #3  NGTD     Assessment:  · Consulted by Dr. Micki Magana to dose/monitor vancomycin  · Goal trough level:  15-20 mcg/mL  · Pt is a 72 y/o F s/p renal transplant on immunosuppressants.  1/2 initial blood sets yielding CoNS  · Serum creatinine today: 1.6; CrCl ~ 39 mL/min; baseline Scr ~ 1-1.2    Plan:  · Dose by levels due to CECILY  · Random level tonight @ 2000  · Follow renal function  · Pharmacist will follow and monitor/adjust dosing as necessary      Thank you for the consult,    Dagoberto Trejo PharmD 3/1/2022 11:53 AM   [phone]    ADDENDUM: Random @ 1958 = 24 mcg/mL. Will repeat level tomorrow morning.  No dose annie Trejo PharmD 3/1/2022 8:53 PM

## 2022-03-01 NOTE — PROGRESS NOTES
Patient lost IV access with only half of zosyn dose completed. Patient scheduled to have PICC placed today by IR.  made aware and stated to give dose after PICC is completed.

## 2022-03-02 VITALS
HEIGHT: 62 IN | WEIGHT: 220 LBS | DIASTOLIC BLOOD PRESSURE: 90 MMHG | BODY MASS INDEX: 40.48 KG/M2 | HEART RATE: 108 BPM | SYSTOLIC BLOOD PRESSURE: 156 MMHG | TEMPERATURE: 98.6 F | OXYGEN SATURATION: 96 % | RESPIRATION RATE: 16 BRPM

## 2022-03-02 LAB
ALBUMIN SERPL-MCNC: 2.7 G/DL (ref 3.5–5.2)
ALP BLD-CCNC: 80 U/L (ref 35–104)
ALT SERPL-CCNC: 11 U/L (ref 0–32)
ANION GAP SERPL CALCULATED.3IONS-SCNC: 11 MMOL/L (ref 7–16)
AST SERPL-CCNC: 12 U/L (ref 0–31)
BASOPHILS ABSOLUTE: 0 E9/L (ref 0–0.2)
BASOPHILS RELATIVE PERCENT: 0 % (ref 0–2)
BILIRUB SERPL-MCNC: 0.5 MG/DL (ref 0–1.2)
BLOOD BANK DISPENSE STATUS: NORMAL
BLOOD BANK DISPENSE STATUS: NORMAL
BLOOD BANK PRODUCT CODE: NORMAL
BLOOD BANK PRODUCT CODE: NORMAL
BLOOD CULTURE, ROUTINE: ABNORMAL
BPU ID: NORMAL
BPU ID: NORMAL
BUN BLDV-MCNC: 22 MG/DL (ref 6–23)
CALCIUM SERPL-MCNC: 10.4 MG/DL (ref 8.6–10.2)
CHLORIDE BLD-SCNC: 107 MMOL/L (ref 98–107)
CO2: 22 MMOL/L (ref 22–29)
CREAT SERPL-MCNC: 1.3 MG/DL (ref 0.5–1)
CULTURE, BLOOD 2: NORMAL
DESCRIPTION BLOOD BANK: NORMAL
DESCRIPTION BLOOD BANK: NORMAL
EOSINOPHILS ABSOLUTE: 0 E9/L (ref 0.05–0.5)
EOSINOPHILS RELATIVE PERCENT: 0 % (ref 0–6)
GFR AFRICAN AMERICAN: 50
GFR NON-AFRICAN AMERICAN: 41 ML/MIN/1.73
GLUCOSE BLD-MCNC: 114 MG/DL (ref 74–99)
HCT VFR BLD CALC: 32.2 % (ref 34–48)
HEMOGLOBIN: 9.4 G/DL (ref 11.5–15.5)
LYMPHOCYTES ABSOLUTE: 0.88 E9/L (ref 1.5–4)
LYMPHOCYTES RELATIVE PERCENT: 12 % (ref 20–42)
MCH RBC QN AUTO: 31.5 PG (ref 26–35)
MCHC RBC AUTO-ENTMCNC: 29.2 % (ref 32–34.5)
MCV RBC AUTO: 108.1 FL (ref 80–99.9)
METER GLUCOSE: 75 MG/DL (ref 74–99)
MONOCYTES ABSOLUTE: 0.22 E9/L (ref 0.1–0.95)
MONOCYTES RELATIVE PERCENT: 3 % (ref 2–12)
MYELOCYTE PERCENT: 3 % (ref 0–0)
NEUTROPHILS ABSOLUTE: 6.21 E9/L (ref 1.8–7.3)
NEUTROPHILS RELATIVE PERCENT: 82 % (ref 43–80)
ORGANISM: ABNORMAL
OVALOCYTES: ABNORMAL
PDW BLD-RTO: 16.7 FL (ref 11.5–15)
PLATELET # BLD: 139 E9/L (ref 130–450)
PMV BLD AUTO: 12.9 FL (ref 7–12)
POIKILOCYTES: ABNORMAL
POLYCHROMASIA: ABNORMAL
POTASSIUM SERPL-SCNC: 4.1 MMOL/L (ref 3.5–5)
RBC # BLD: 2.98 E12/L (ref 3.5–5.5)
SARS-COV-2, NAAT: NOT DETECTED
SODIUM BLD-SCNC: 140 MMOL/L (ref 132–146)
TOTAL PROTEIN: 5.4 G/DL (ref 6.4–8.3)
VANCOMYCIN RANDOM: 19.5 MCG/ML (ref 5–40)
WBC # BLD: 7.3 E9/L (ref 4.5–11.5)

## 2022-03-02 PROCEDURE — 36415 COLL VENOUS BLD VENIPUNCTURE: CPT

## 2022-03-02 PROCEDURE — APPSS45 APP SPLIT SHARED TIME 31-45 MINUTES: Performed by: NURSE PRACTITIONER

## 2022-03-02 PROCEDURE — 99223 1ST HOSP IP/OBS HIGH 75: CPT | Performed by: INTERNAL MEDICINE

## 2022-03-02 PROCEDURE — 92526 ORAL FUNCTION THERAPY: CPT | Performed by: SPEECH-LANGUAGE PATHOLOGIST

## 2022-03-02 PROCEDURE — 87635 SARS-COV-2 COVID-19 AMP PRB: CPT

## 2022-03-02 PROCEDURE — 94640 AIRWAY INHALATION TREATMENT: CPT

## 2022-03-02 PROCEDURE — 6370000000 HC RX 637 (ALT 250 FOR IP): Performed by: INTERNAL MEDICINE

## 2022-03-02 PROCEDURE — 6360000002 HC RX W HCPCS: Performed by: INTERNAL MEDICINE

## 2022-03-02 PROCEDURE — 80053 COMPREHEN METABOLIC PANEL: CPT

## 2022-03-02 PROCEDURE — 94660 CPAP INITIATION&MGMT: CPT

## 2022-03-02 PROCEDURE — 97110 THERAPEUTIC EXERCISES: CPT | Performed by: SPEECH-LANGUAGE PATHOLOGIST

## 2022-03-02 PROCEDURE — 2700000000 HC OXYGEN THERAPY PER DAY

## 2022-03-02 PROCEDURE — 97110 THERAPEUTIC EXERCISES: CPT

## 2022-03-02 PROCEDURE — 99239 HOSP IP/OBS DSCHRG MGMT >30: CPT | Performed by: INTERNAL MEDICINE

## 2022-03-02 PROCEDURE — 80202 ASSAY OF VANCOMYCIN: CPT

## 2022-03-02 PROCEDURE — 82962 GLUCOSE BLOOD TEST: CPT

## 2022-03-02 PROCEDURE — 85025 COMPLETE CBC W/AUTO DIFF WBC: CPT

## 2022-03-02 RX ORDER — PANTOPRAZOLE SODIUM 40 MG/1
40 TABLET, DELAYED RELEASE ORAL
Qty: 30 TABLET | Refills: 0 | DISCHARGE
Start: 2022-03-02 | End: 2022-09-15

## 2022-03-02 RX ORDER — PANTOPRAZOLE SODIUM 40 MG/1
40 TABLET, DELAYED RELEASE ORAL
Status: DISCONTINUED | OUTPATIENT
Start: 2022-03-02 | End: 2022-03-02 | Stop reason: HOSPADM

## 2022-03-02 RX ORDER — AMOXICILLIN AND CLAVULANATE POTASSIUM 875; 125 MG/1; MG/1
1 TABLET, FILM COATED ORAL EVERY 12 HOURS SCHEDULED
Qty: 20 TABLET | Refills: 0 | Status: ON HOLD | OUTPATIENT
Start: 2022-03-02 | End: 2022-03-20 | Stop reason: HOSPADM

## 2022-03-02 RX ORDER — CARVEDILOL 25 MG/1
25 TABLET ORAL 2 TIMES DAILY WITH MEALS
Status: DISCONTINUED | OUTPATIENT
Start: 2022-03-02 | End: 2022-03-02 | Stop reason: HOSPADM

## 2022-03-02 RX ADMIN — IPRATROPIUM BROMIDE AND ALBUTEROL SULFATE 1 AMPULE: .5; 2.5 SOLUTION RESPIRATORY (INHALATION) at 09:57

## 2022-03-02 RX ADMIN — ALLOPURINOL 100 MG: 100 TABLET ORAL at 09:02

## 2022-03-02 RX ADMIN — MYCOPHENOLATE MOFETIL 1000 MG: 250 CAPSULE ORAL at 09:00

## 2022-03-02 RX ADMIN — BUDESONIDE 500 MCG: 0.5 INHALANT RESPIRATORY (INHALATION) at 17:22

## 2022-03-02 RX ADMIN — LEVOTHYROXINE SODIUM 50 MCG: 0.05 TABLET ORAL at 06:29

## 2022-03-02 RX ADMIN — DESVENLAFAXINE SUCCINATE 50 MG: 50 TABLET, FILM COATED, EXTENDED RELEASE ORAL at 17:37

## 2022-03-02 RX ADMIN — CYCLOSPORINE 100 MG: 25 CAPSULE, GELATIN COATED ORAL at 09:01

## 2022-03-02 RX ADMIN — IPRATROPIUM BROMIDE AND ALBUTEROL SULFATE 1 AMPULE: .5; 2.5 SOLUTION RESPIRATORY (INHALATION) at 06:45

## 2022-03-02 RX ADMIN — BUDESONIDE 500 MCG: 0.5 INHALANT RESPIRATORY (INHALATION) at 06:45

## 2022-03-02 RX ADMIN — APIXABAN 5 MG: 5 TABLET, FILM COATED ORAL at 09:25

## 2022-03-02 RX ADMIN — PREDNISONE 5 MG: 5 TABLET ORAL at 09:00

## 2022-03-02 RX ADMIN — DOCUSATE SODIUM 100 MG: 100 CAPSULE ORAL at 09:01

## 2022-03-02 RX ADMIN — AMOXICILLIN AND CLAVULANATE POTASSIUM 1 TABLET: 875; 125 TABLET, FILM COATED ORAL at 09:00

## 2022-03-02 RX ADMIN — PANTOPRAZOLE SODIUM 40 MG: 40 TABLET, DELAYED RELEASE ORAL at 11:45

## 2022-03-02 RX ADMIN — IPRATROPIUM BROMIDE AND ALBUTEROL SULFATE 1 AMPULE: .5; 2.5 SOLUTION RESPIRATORY (INHALATION) at 17:22

## 2022-03-02 RX ADMIN — IPRATROPIUM BROMIDE AND ALBUTEROL SULFATE 1 AMPULE: .5; 2.5 SOLUTION RESPIRATORY (INHALATION) at 14:45

## 2022-03-02 RX ADMIN — PANTOPRAZOLE SODIUM 40 MG: 40 TABLET, DELAYED RELEASE ORAL at 16:47

## 2022-03-02 RX ADMIN — FOLIC ACID 1 MG: 1 TABLET ORAL at 09:00

## 2022-03-02 ASSESSMENT — PAIN SCALES - GENERAL: PAINLEVEL_OUTOF10: 0

## 2022-03-02 NOTE — PROGRESS NOTES
Date: 3/2/2022    Time: 1:06 AM    Patient Placed On BIPAP/CPAP/ Non-Invasive Ventilation?   No    If no must comment      Comments: patient is refusing to wear BIPAP at this time        Isabel Catalan RCP

## 2022-03-02 NOTE — PROGRESS NOTES
Speech Language Pathology      NAME:  Shannon Sharma  :  1955  DATE: 3/2/2022  ROOM:  [facility]-    Patient seen for dysphagia therapy 30 mintues  Patient needs moderate cuing to recall strategies to improve swallow function. Patient needs frequent redirection to tasks when completing exercises to improve swallow function. Second session 15 minutes. Trial of solids and thin tolerated well. Son present and feels patient is closer to her baseline and had been on regular and thin prior to admit. Reviewed last 2 swallow studies and degree of penetration is unchanged.   Now that alertness and ability to feed self has improved this week recommend diet be advanced to Soft and bite size consistency solids (IDDSI level 6) with  thin liquids (IDDSI level 0) son in agreement with plan       Dehydration [E86.0]  Colitis [K52.9]  Hypoxia [R09.02]  Elevated troponin [R77.8]  Acute respiratory failure with hypoxia (HCC) [J96.01]  Altered mental status, unspecified altered mental status type [R41.82]    99969  dysphagia tx x2  69667  oral motor exercises (15 min)    Danyelle Navarrete MSCCC/SLP  Speech Language Pathologist  [phone]

## 2022-03-02 NOTE — ACP (ADVANCE CARE PLANNING)
Advance Care Planning   Healthcare Decision Maker:    Primary Decision Maker: Mayur Cheema - [facility] - [phone]    Secondary Decision Maker: Adrian Yarbrough - [facility] - [phone]

## 2022-03-02 NOTE — CARE COORDINATION
3-2-Cm note: ( covid neg 2-25/pending rapid )  [facility] is scheduled to transport pt back to [facility] via stretcher at 6:30 PM, family at bedside and aware of transport time.  Electronically signed by Ryann Wang RN on 3/2/2022 at 1:35 PM

## 2022-03-02 NOTE — PLAN OF CARE
Problem: Skin Integrity:  Goal: Will show no infection signs and symptoms  Description: Will show no infection signs and symptoms  3/2/2022 0300 by Dionne Jama RN  Outcome: Met This Shift     Problem: Skin Integrity:  Goal: Absence of new skin breakdown  Description: Absence of new skin breakdown  3/2/2022 0300 by Dionne Jama RN  Outcome: Met This Shift     Problem: Falls - Risk of:  Goal: Will remain free from falls  Description: Will remain free from falls  3/2/2022 0300 by Dionne Jama RN  Outcome: Met This Shift     Problem: Falls - Risk of:  Goal: Absence of physical injury  Description: Absence of physical injury  3/2/2022 0300 by Dionne Jama RN  Outcome: Met This Shift

## 2022-03-02 NOTE — PROGRESS NOTES
Physical Therapy    Physical Therapy Treatment Note/Plan of Care    Room #:  6931/5683-40  Patient Name: Ronni Dakins  YOB: 1955  MRN: [id number]    Date of Service: 3/2/2022     Tentative placement recommendation: [facility] Car[facility] with Physical Therapy   Equipment recommendation: Patient has needed equipment       Evaluating Physical Therapist: Dayna Rodríguez  #[id number]      Specific Provider Orders/Date/Referring Provider :  02/25/22 0530   PT evaluation and treat Start: 02/25/22 0530, End: 02/25/22 0530, ONE TIME, Standing Count: 1 Occurrences, R    Order went unreviewed at transfer on Fri Feb 25, 2022 5:31 Dawn Liu MD      Admitting Diagnosis:   Dehydration [E86.0]  Colitis [K52.9]  Hypoxia [R09.02]  Elevated troponin [R77.8]  Acute respiratory failure with hypoxia ([address] [J96.01]  Altered mental status, unspecified altered mental status type [R41.82]    Admitted with    altered mental status at snf with hypoxia  Surgery: none  Visit Diagnoses       Codes    Colitis     K52.9    Dehydration     E86.0    Elevated troponin     R77.8          Patient Active Problem List   Diagnosis    Peripheral vascular disease ([address]    Depression    Clotting disorder ([address]    Abnormal EKG    Cancer ([address]    Over weight    S/p cadaver renal transplant    ESRD (end stage renal disease) ([address]    Non morbid obesity due to excess calories    Hypertension    Leg swelling    Lymphedema of both lower extremities    Acute gout involving toe of right foot    Sepsis secondary to UTI ([address]    Acute kidney injury superimposed on CKD ([address]    Thyroid disease    Acute on chronic combined systolic (congestive) and diastolic (congestive) heart failure ([address]    Sepsis ([address]    Acute renal failure ([address]    CECILY (acute kidney injury) ([address]    Ischemic bowel disease ([address]    Altered mental status    Coma ([address]    Acute respiratory failure with hypoxia ([address]    Hypoxia    Acute metabolic encephalopathy    Acute deep vein thrombosis (DVT) of femoral vein of left lower extremity (HCC)    Anemia    Positive blood culture    Difficult intravenous access        ASSESSMENT of Current Deficits Patient exhibits decreased strength, balance, endurance, range of motion and coordination impairing functional mobility, transfers, tolerance to activity and participation. Patient agreeable to supine exercises today reports she is leaving for rehab today. Decreased strength, balance and endurance  increases patient's risk for fall. At end of session, LUE propped up on pillow and chux changed under LUE. Patient requires continued skilled physical therapy to address concerns listed above for increased safety and function at discharge. PHYSICAL THERAPY  PLAN OF CARE       Physical therapy plan of care is established based on physician order,  patient diagnosis and clinical assessment    Current Treatment Recommendations:    -Bed Mobility: Lower extremity exercises , Upper extremity exercises  and Trunk control activities   -Sitting Balance: Hands on support to maintain midline , Facilitate active trunk muscle engagement  and Facilitate postural control in all planes     PT long term treatment goals are located in below grid    Patient and or family understand(s) diagnosis, prognosis, and plan of care. Frequency of treatments: Patient will be seen  daily.          Prior Level of Function: Patient non ambulatory > 4 weeks  Rehab Potential: fair    for baseline    Past medical history:   Past Medical History:   Diagnosis Date    Abnormal EKG     left bundle branch block    Acute gout involving toe of right foot 09/03/2020    Acute on chronic combined systolic (congestive) and diastolic (congestive) heart failure (HCC)     Cancer (HCC)     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    Cerebrovascular disease     Clotting disorder ([facility] 1985    thrombophlebitis after c section delivery    Depression     15 years followed by dr Aly Robb Hyperlipidemia     Hypertension     Hypothyroidism     Leg swelling 09/03/2020    Lymphedema of both lower extremities 09/03/2020    Peripheral vascular disease ([address]     Renal failure 11/2012    renal transplant    Thrombophlebitis     after c section delivery    Thyroid disease      Past Surgical History:   Procedure Laterality Date   [address]    one normal delivery    COLECTOMY N/A 1/15/2022    DIAGNOSTIC  LAPAROSCOPY LYSIS OF ADHESIONS performed by Ignacio Lowe MD at [address] CATH LAB PROCEDURE  2006    normal coronaries and 1996 normal coronaries    DIALYSIS FISTULA CREATION  march and july 2012    phase one and two patient will start dialysis when needed   [address]    transfemoral venous bypass grafts    IR IVC FILTER PLACEMENT W IMAGING  2/26/2022    IR IVC FILTER PLACEMENT W IMAGING 2/26/2022 [address] SPECIAL PROCEDURES    KIDNEY TRANSPLANT  2016    KIDNEY TRANSPLANT  2015    KIDNEY TRANSPLANT  2015    PARATHYROIDECTOMY  2006    left parathyroid  implant and parathyroidectomy    TRANSESOPHAGEAL ECHOCARDIOGRAM N/A 2/28/2022    TRANSESOPHAGEAL ECHOCARDIOGRAM WITH BUBBLE STUDY performed by Felicita Dey MD at [address]:    Precautions:  Up with assistance, falls, alarm and O2 ,  2 Liters of o2 via nasal cannula   Social history: Patient lives in a skilled nursing facility      Equipment owned: unknown,       [address]   How much difficulty turning over in bed?: A Lot  How much difficulty sitting down on / standing up from a chair with arms?: Unable  How much difficulty moving from lying on back to sitting on side of bed?: A Lot  How much help from another person moving to and from a bed to a chair?: Total  How much help from another person needed to walk in hospital room?: Total  How much help from another person for climbing 3-5 steps with a railing?: Total  AM-PAC Inpatient Mobility Raw Score : 8  AM-PAC Inpatient T-Scale Score : 28.52  Mobility Inpatient CMS 0-100% Score: 86.62  Mobility Inpatient CMS G-Code Modifier : CM    Nursing cleared patient for PT treatment. OBJECTIVE;   Initial Evaluation  Date: 2/25/2022 Treatment Date:  3/2/2022     Short Term/ Long Term   Goals   Was pt agreeable to Eval/treatment? Yes Yes  To be met in 5 days   Pain level   None reported None     Bed Mobility    Rolling: Maximal assist of  2    Supine to sit: Maximal assist of 1    Sit to supine: Maximal assist of 1    Scooting: Dependent assist of 1   Rolling: Not assessed    Supine to sit: Not assessed    Sit to supine: Not assessed    Scooting: Not assessed     Rolling: Moderate assist of 1    Supine to sit: Moderate assist of 1    Sit to supine: Moderate assist of 1    Scooting:  Moderate assist of 1     ROM Within functional limits  with exception of bilateral ankles  Increase range of motion 10% of affected joints    Strength LUE:  1/5   RUE 2/5  RLE:  2/5  LLE:  2/5  Increase strength in affected mm groups by 1/3 grade   Balance Sitting EOB:  poor lateral lean to right with inability to shift to left and sit midline    Sitting EOB: not assessed    Sitting EOB:  fair          Patient is Alert & Oriented x person and follows one step directions    Sensation:  not assessed   Edema:  yes bilateral lower extremities and left upper extremity   Endurance: poor      Vitals: room air   Blood Pressure at rest  Blood Pressure during session    Heart Rate at rest   Heart Rate during session     SPO2 at rest %  SPO2 during session %     Patient education  Patient educated on role of Physical Therapy, risks of immobility, safety and plan of care,  importance of mobility while in hospital , safety  and positioning for skin integrity and comfort     Patient response to education:   Pt verbalized understanding Pt demonstrated skill Pt requires further education in this area   Yes Partial Yes      Treatment:  Patient practiced and was instructed/facilitated in the following treatment: Patient   performed supine exercise AAROM UE/LE, repositioning of L UE onto pillow. Therapeutic Exercises:  ankle pumps, quad sets, glut sets, heel slide, hip abduction/adduction, straight leg raise, elbow flexion/extension, wrist flexion/extension and grasp/relax x 10 reps. At end of session, patient in bed with alarm and respiratory therapist present call light and phone within reach,  all lines and tubes intact, nursing notified. Patient would benefit from continued skilled Physical Therapy to improve functional independence and quality of life.          Patient's/ family goals   none stated    Time in  0952  Time out  1008    Total Treatment Time  16 minutes    CPT codes:    Therapeutic exercises (48311)   16 minutes  1 unit(s)    Elke Mercado, [facility] #[id number]

## 2022-03-02 NOTE — PROGRESS NOTES
Pharmacy Consultation Note  (Antibiotic Dosing and Monitoring)    Initial consult date: 2/28/22  Consulting physician: Dr Paula Ortega  Drug(s): Vancomycin IV  Indication: Bacteremia      Patient has refused morning labs including the vancomycin level. Anticipated discharge today per notes. Will not re-dose vancomycin prior to discharge due to no level.       Jay Katz, PharmD 3/2/2022 12:00 PM   [phone]

## 2022-03-02 NOTE — DISCHARGE SUMMARY
[facility] Physician Discharge Summary        [address] [facility] Althea Sellers. [facility] [address]  [address], [address] [phone]    Call in 1 day  Heart failure follow up and education, Please set a visit within 3-7 days of hospital D/C    Wagner Lezama MD  [address] [facility] [address]  [phone]    Schedule an appointment as soon as possible for a visit in 1 week  Call to schedule a post hospital follow up appointment     Garima Baker MD  [address] [facility] [address]  [phone]    Schedule an appointment as soon as possible for a visit in 1 week  Call to schedule a post hospital follow up appointment     Ramsey Hernandez, [address] [facility]    In 2 weeks  Call to schedule a post hospital follow-up appointment     DO Matthieu Rodriguez[facility] [phone]    Schedule an appointment as soon as possible for a visit in 1 week  Call for a follow up appointment     Emanuel Escoto MD  [address] [place]  [phone]    Schedule an appointment as soon as possible for a visit in 1 week  As needed      Activity level: As tolerated    Diet: ADULT ORAL NUTRITION SUPPLEMENT; Lunch, Dinner; Frozen Oral Supplement  ADULT DIET; Dysphagia - Soft and Bite Sized    Labs: BMP on Monday 03/07/2022    Dispo: [address] [place] [address] and Rehabilitation     Condition at discharge: Stable    Continue CPAP / BiPAP during sleep as prior to admission.     Patient ID:  Geraldine Morgan  [id number]  73 y.o.  1955    Admit date: 2/25/2022    Discharge date and time:  3/2/2022  1:42 PM    Admission Diagnoses: Principal Problem:    Acute respiratory failure with hypoxia ([address]  Active Problems:    Hypoxia    Acute metabolic encephalopathy    Acute deep vein thrombosis (DVT) of femoral vein of left lower extremity (HCC)    Anemia    Positive blood culture    Difficult intravenous access  Resolved Problems:    * No resolved hospital problems. *      Discharge Diagnoses: Principal Problem:    Acute respiratory failure with hypoxia (HCC)  Active Problems:    Hypoxia    Acute metabolic encephalopathy    Acute deep vein thrombosis (DVT) of femoral vein of left lower extremity (HCC)    Anemia    Positive blood culture    Difficult intravenous access  Resolved Problems:    * No resolved hospital problems. *      Consults:  IP CONSULT TO PULMONOLOGY  IP CONSULT TO GI  IP CONSULT TO CARDIOLOGY  IP CONSULT TO NEPHROLOGY  IP CONSULT TO INFECTIOUS DISEASES  IP CONSULT TO DIETITIAN  IP CONSULT TO GI  IP CONSULT TO GENERAL SURGERY  IP CONSULT TO PHARMACY    Procedures: Insertion IVC filter and CLEMENCIA Echocardiogram    Hospital Course: Patient was admitted with Dehydration [E86.0]  Colitis [K52.9]  Hypoxia [R09.02]  Elevated troponin [R77.8]  Acute respiratory failure with hypoxia ([address] [J96.01]  Altered mental status, unspecified altered mental status type [R41.82]. Patient is a 71-year-old female who presented to the ED with complaints of altered mental status. Patient was admitted with dehydration, colitis, acute respiratory failure with hypoxia and altered mental status. Patient hospital consisted of several consults to patient list such as critical care pulmonology for acute on chronic hypoxia and hypercapnia respiratory failure secondary to aspiration pneumonitis. Patient was placed on supplemental oxygen during the day to keep sat greater than 88 to 94%. During the night patient was placed on BiPAP. Patient had speech evaluation for possible aspiration which her barium swallow was essentially negative.   Patient also had a VQ scan/ultrasound to rule out PE PE and DVT, pulmonary embolism was negative but however DVT was positive for a incompletely occlusive thrombi in the mid and distal left femoral vein and within the popliteal vein. Patient was started on anticoagulation which at first was going really been patient had a questionable GI bleed as her hemoglobin/hematocrit continue to drop therefore GI was consulted. GI was also following the patient for colitis. Patient had recently had surgery and pneumatosis. There was a 1.4 cm pancreatic lesion and liver cyst seen also on patient's recent CT scan. As far as the possible GI bleed EGD was planned but however patient has refused. IR was consulted for insertion of IVC filter. Cardiology was consulted for preoperative clearance for EGD. Patient was cleared by cardiology but as stated earlier refused to proceed with scope. Patient also had an CLEMENCIA echocardiogram to evaluate for vegetation. Nephrology was consulted for S/p kidney transplant. Patient was continued on her rejection medications and supportive care management. Infectious disease was consulted for positive fungitell in January. General surgery was consulted for mckenzie-rectal lesion. Patient is not a good surgical candidate for any procedures but at some point will benefit from a biopsy and c-scope. Patient has remained stable and afebrile. Patient will be discharged to SNF with the following medications and instructions. Discharge Exam:  Vitals:    03/01/22 1930 03/02/22 0615 03/02/22 0645 03/02/22 0745   BP: 139/66 (!) 164/90  133/78   Pulse: 103 94  90   Resp: 16 18  18   Temp: 98.2 °F (36.8 °C) 97.5 °F (36.4 °C)  97.5 °F (36.4 °C)   TempSrc: Infrared Infrared     SpO2: 95% 100% 99% 97%   Weight:       Height:           General Appearance: alert and oriented to person, place and time, well-developed and well nourished, in no acute distress and with some intermittent confusion.   Obese  Skin: warm and dry  Head: normocephalic and atraumatic  Eyes: pupils equal, round, and reactive to light and conjunctivae normal  ENT: hearing grossly normal bilaterally  Neck: neck supple and non tender without mass   Pulmonary/Chest: decreased breath sounds noted-throughout with no adventitious breath sounds noted  Cardiovascular: normal rate, regular rhythm and intact distal pulses  Abdomen: soft, non-tender, non-distended and bowel sounds present  Extremities: no cyanosis, no clubbing and 1+ bilateral lower extremity edema  Musculoskeletal: no swollen joints, no joint deformity and no tender joints  Neurologic: no cranial nerve deficit and speech normal    I/O last 3 completed shifts: In: 290 [P.O.:290]  Out: 1625 [Urine:1625]  I/O this shift:  In: 120 [P.O.:120]  Out: -       LABS:  Recent Labs     02/28/22  1350 03/01/22  0527 03/02/22  1133    139 140   K 4.3 4.1 4.1    108* 107   CO2 23 21* 22   BUN 25* 25* 22   CREATININE 1.6* 1.6* 1.3*   GLUCOSE 81 80 114*   CALCIUM 9.6 9.7 10.4*       Recent Labs     02/28/22  1350 02/28/22  2307 03/01/22  0527 03/01/22  1101 03/02/22  1133   WBC 5.5  --  5.8  --  7.3   RBC 2.81*  --  2.70*  --  2.98*   HGB 9.0*   < > 8.6* 8.8* 9.4*   HCT 30.8*  --  29.3*  --  32.2*   .6*  --  108.5*  --  108.1*   MCH 32.0  --  31.9  --  31.5   MCHC 29.2*  --  29.4*  --  29.2*   RDW 17.0*  --  16.8*  --  16.7*   *  --  135  --  139   MPV 13.3*  --  12.9*  --  12.9*    < > = values in this interval not displayed. No results for input(s): POCGLU in the last 72 hours. Imaging:   US DUP UPPER EXTREMITY LEFT VENOUS   Final Result   No evidence of DVT. IR GUIDED IVC FILTER PLACEMENT   Final Result   Successful vena cava filter placement. PLAN:   Retrieval intent (MIPS): The filter is potentially retrievable. Plan/Timing For Retrieval:      Ordering Physician/Contact For Retrieval Plan: Choco Phelps         IR ULTRASOUND GUIDANCE VASCULAR ACCESS   Final Result   Successful vena cava filter placement. PLAN:   Retrieval intent (MIPS): The filter is potentially retrievable.       Plan/Timing For Retrieval:      Ordering Physician/Contact For Retrieval Plan: Monik GUERRIER MODIFIED BARIUM SWALLOW W VIDEO   Final Result   Strands it laryngeal penetration with thin liquid barium. No evidence of   aspiration. Please see separate speech pathology report for full discussion of findings   and recommendations. RECOMMENDATIONS:   Unavailable         US DUP LOWER EXTREMITIES BILATERAL VENOUS   Final Result   1. INCOMPLETELY OCCLUSIVE THROMBI in the mid and distal LEFT femoral vein and   within the popliteal vein. They have developed in the interim since the   previous study from 11/17/2021.   2. No evidence of DVT in the right lower extremity. RECOMMENDATIONS:   Unavailable         NM LUNG VENT/PERFUSION (VQ)   Final Result   Negative for pulmonary embolus. XR CHEST 1 VIEW   Final Result   1. Mild cardiomegaly. There is no evidence of failure or pneumonia. CT HEAD WO CONTRAST   Final Result   No acute intracranial abnormality. MRI would be useful if symptoms persist.      Inflammatory changes of left greater than right mastoid air cells and left   sphenoid sinus similar to prior MRI. RECOMMENDATIONS:   Unavailable         CTA PULMONARY W CONTRAST   Final Result   Allowing for patient motion artifact, no identified pulmonary embolism. Significant improvement in aeration of the lungs with some residual areas of   presumed atelectasis in the right greater than left base. Cardiomegaly. RECOMMENDATIONS:   Unavailable         CT ABDOMEN PELVIS W IV CONTRAST Additional Contrast? None   Final Result   Thickening of the proximal ascending colon. Given the short-term change,   this is favored to represent a localized area of colitis likely infectious or   inflammatory. Neoplasia thought less likely but follow-up to resolution   would be recommended. Moderate stool in the distal colon.       Minimal distention of several proximal small bowel loops likely incidental or   due to mild enteritis. Small hiatal hernia. Other chronic appearing findings. RECOMMENDATIONS:   Unavailable         IR FLUORO GUIDED CVA DEVICE PLMT/REPLACE/REMOVAL    (Results Pending)   IR ULTRASOUND GUIDANCE VASCULAR ACCESS    (Results Pending)       Patient Instructions:      Medication List      START taking these medications    amoxicillin-clavulanate 875-125 MG per tablet  Commonly known as: AUGMENTIN  Take 1 tablet by mouth every 12 hours for 10 days     pantoprazole 40 MG tablet  Commonly known as: PROTONIX  Take 1 tablet by mouth every morning (before breakfast)        CONTINUE taking these medications    allopurinol 100 MG tablet  Commonly known as: ZYLOPRIM     apixaban 5 MG Tabs tablet  Commonly known as: ELIQUIS     atorvastatin 10 MG tablet  Commonly known as: LIPITOR     B-12 Compliance Injection 1000 MCG/ML Kit  Generic drug: Cyanocobalamin     budesonide 0.5 MG/2ML nebulizer suspension  Commonly known as: PULMICORT  Take 2 mLs by nebulization 2 times daily for 10 days     cloZAPine 50 MG tablet  Commonly known as: CLOZARIL     cycloSPORINE 100 MG capsule  Commonly known as: SANDIMMUNE     desvenlafaxine succinate 50 MG Tb24 extended release tablet  Commonly known as: PRISTIQ  Take 1 tablet by mouth every evening     docusate sodium 100 MG capsule  Commonly known as: COLACE     folic acid 1 MG tablet  Commonly known as: FOLVITE     ipratropium-albuterol 0.5-2.5 (3) MG/3ML Soln nebulizer solution  Commonly known as: DUONEB  Inhale 3 mLs into the lungs every 6 hours as needed for Shortness of Breath     levothyroxine 50 MCG tablet  Commonly known as: SYNTHROID     lisinopril 5 MG tablet  Commonly known as: PRINIVIL;ZESTRIL     metoprolol succinate 50 MG extended release tablet  Commonly known as: TOPROL XL  take 1 tablet by mouth once daily     mycophenolate 500 MG tablet  Commonly known as: CELLCEPT     nystatin 222723 UNIT/GM powder  Commonly known as: MYCOSTATIN  Apply 3 times daily. ondansetron 4 MG disintegrating tablet  Commonly known as: ZOFRAN-ODT  Take 1 tablet by mouth every 8 hours as needed for Nausea or Vomiting     predniSONE 5 MG tablet  Commonly known as: DELTASONE     pregabalin 75 MG capsule  Commonly known as: LYRICA        STOP taking these medications    furosemide 20 MG tablet  Commonly known as: LASIX     simvastatin 20 MG tablet  Commonly known as: ZOCOR           Where to Get Your Medications      These medications were sent to [facility], [place] [address] [facility] [phone] [address] [place] [address]    Phone: [phone]   · amoxicillin-clavulanate 875-125 MG per tablet     Information about where to get these medications is not yet available    Ask your nurse or doctor about these medications  · pantoprazole 40 MG tablet           Note that more than 30 minutes was spent in preparing discharge papers, discussing discharge with patient, medication review, etc.    Signed:  Electronically signed by KATINA Goode CNP on 3/2/2022 at 1:42 PM    NOTE: This report was transcribed using voice recognition software. Every effort was made to ensure accuracy; however, inadvertent computerized transcription errors may be present.

## 2022-03-02 NOTE — PROGRESS NOTES
[facility]    [address] [place] [address] 80  [place], [address] [place] [address] Street  Phone [phone]   [phone]      Admit Date: 2022  1:08 AM  Pt Name: Ronni Dakins  MRN: [id number]  : 1955  Reason for Consult:    Chief Complaint   Patient presents with    Altered Mental Status     hx of bipolar, Hx liver failure/renal failure, No history of AMS, no HX of dialysis, was hypoxic at [facility] 76% on room air was placed on 3L NC 93%. Requesting Physician:  Herbie Watson DO  PCP: Anna Kaplan MD  History Obtained From:  patient, chart   ID consulted for Dehydration [E86.0]  Colitis [K52.9]  Hypoxia [R09.02]  Elevated troponin [R77.8]  Acute respiratory failure with hypoxia (HCC) [J96.01]  Altered mental status, unspecified altered mental status type [R41.82]  on hospital day 5   Face to face encounter   [facility]       Chief Complaint   Patient presents with    Altered Mental Status     hx of bipolar, Hx liver failure/renal failure, No history of AMS, no HX of dialysis, was hypoxic at [facility] 76% on room air was placed on 3L NC 93%. HISTORYOF PRESENT ILLNESS   Ronni Dakins is a 73 y.o. female who presents with   has a past medical history of Abnormal EKG, Acute gout involving toe of right foot, Acute on chronic combined systolic (congestive) and diastolic (congestive) heart failure ([address] Cancer ([address] Cerebrovascular disease, Clotting disorder ([address] Depression, Hyperlipidemia, Hypertension, Hypothyroidism, Leg swelling, Lymphedema of both lower extremities, Peripheral vascular disease ([address] Renal failure, Thrombophlebitis, and Thyroid disease. Altered Mental Status       Patient was admitted to hospital from nursing facility- she was hypoxic and brought to the ER for further evaluation. She is an immunocompromised patient - with history of renal transplant.    She is currently on Bipap- lethargic- receiving blood- all information obtained from review of records. She has been afebrile. She had a blood culture turn for Staph Epi   She is currently on Zosyn      Patient is known to ID service- 9/60/4573- she had a complicated hospital stay at that time - she is S/P COVID   She was treated for colitis- she had SBO. Her fungitell was positive- she was on diflucan  She had a UTI- was also treated for HCAP. Patient was then transferred to [facility] on 1/25/2022    ID has been asked to evaluate and manage antibiotics. DOS  3/2/2022  In bed has no c/o awake and alert for d/c today   Per staff refusing procedures  3/1/2022  Afebrile on NC bipap at night  Has no c/o   Summary CLEMENCIA   Technically difficult study.    No definate evidence of vegetation consistent with endocarditis.    Normal LV segmental wall motion.    Ejection fraction is visually estimated at 60 to 65%.    Normal right ventricular size and function.    Cr1.6 wbc5.8 hgb8.8 [id number]  Blood cx cons     2/28/2022  PT IS AWAKE AND ALERT NAD    AFEBRILE 2L  CR1.6  WBC5.5  BLOOD CX CONS  URINE CX NGTD    REVIEW OF SYSTEMS     CONSTITUTIONAL:   No fever, chills, weight loss  ALLERGIES:    No urticaria, hay fever,    EYES:     No blurry vision, loss of vision, eye pain  ENT:      No hearing loss, sore throat  CARDIOVASCULAR:   + HTN, + hyperlipidemia   RESPIRATORY:   No cough, sob  ENDOCRINE:    No increase thirst, urination , + thyroid disease   HEME-LYMPH:   No easy bruising or bleeding  GI:     No nausea, vomiting or diarrhea  :     No urinary complaints  NEURO:    No seizures, history of stroke, HA  MUSCULOSKELETAL:  No muscle aches or pain, no joint pain  SKIN:     No rash or itch  PSYCH:    + depression or anxiety     CURRENT MEDICATIONS     Current Facility-Administered Medications:     pantoprazole (PROTONIX) tablet 40 mg, 40 mg, Oral, BID AC, Jeovany Raspovic, DO, 40 mg at 03/02/22 1145    apixaban (ELIQUIS) tablet 5 mg, 5 mg, Oral, BID, Jeovany Raspovic, DO, 5 mg at 03/02/22 0925    polyethylene glycol (GLYCOLAX) packet 17 g, 17 g, Oral, BID PRN, Porsha Adame MD    amoxicillin-clavulanate (AUGMENTIN) 875-125 MG per tablet 1 tablet, 1 tablet, Oral, 2 times per day, Chrissy Medina DO, 1 tablet at 03/02/22 0900    sodium chloride flush 0.9 % injection 5-40 mL, 5-40 mL, IntraVENous, 2 times per day, Jeovany Hankins DO, 10 mL at 02/28/22 2326    0.9 % sodium chloride infusion, 25 mL, IntraVENous, PRN, Juan F Hankins DO    heparin flush 100 UNIT/ML injection 100 Units, 1 mL, IntraCATHeter, PRN, Juan F Hankins DO    lidocaine PF 1 % injection 5 mL, 5 mL, IntraDERmal, Once, Chrissy Medina DO    sodium chloride flush 0.9 % injection 5-40 mL, 5-40 mL, IntraVENous, 2 times per day, Jeovany Hankins DO, 10 mL at 02/28/22 2326    0.9 % sodium chloride infusion, 25 mL, IntraVENous, PRN, Juan F Hankins DO    heparin flush 100 UNIT/ML injection 300 Units, 3 mL, IntraVENous, 2 times per day, Jeovany Hankins DO    heparin flush 100 UNIT/ML injection 300 Units, 3 mL, IntraCATHeter, PRN, Chrissy Medina DO    vancomycin (VANCOCIN) intermittent dosing (placeholder), , Other, RX Placeholder, Suma Farley MD    sodium chloride flush 0.9 % injection 5-40 mL, 5-40 mL, IntraVENous, PRN, KATINA Fish - CNP, 10 mL at 02/27/22 2205    0.9 % sodium chloride infusion, 25 mL, IntraVENous, PRNAnayeli APRN - CNP    heparin flush 100 UNIT/ML injection 100 Units, 1 mL, IntraCATHeter, PRNAnayeli APRN - CNP    sodium chloride flush 0.9 % injection 5-40 mL, 5-40 mL, IntraVENous, PRN, Ryan Ramirez MD    0.9 % sodium chloride infusion, 25 mL, IntraVENous, PRN, Ryan Ramirez MD    ondansetron (ZOFRAN-ODT) disintegrating tablet 4 mg, 4 mg, Oral, Q8H PRN **OR** ondansetron (ZOFRAN) injection 4 mg, 4 mg, IntraVENous, Q6H PRN, Ryan Ramirez MD    acetaminophen (TYLENOL) tablet 650 mg, 650 mg, Oral, Q6H PRN, 650 mg at 02/27/22 0841 **OR** acetaminophen (TYLENOL) suppository 650 mg, 650 mg, Rectal, Q6H PRN, Giovana Villavicencio MD    ipratropium-albuterol (DUONEB) nebulizer solution 1 ampule, 1 ampule, Inhalation, 4x daily, Giovana Villavicencio MD, 1 ampule at 03/02/22 1445    allopurinol (ZYLOPRIM) tablet 100 mg, 100 mg, Oral, Daily, Clarisse Ramirez MD, 100 mg at 03/02/22 0902    budesonide (PULMICORT) nebulizer suspension 500 mcg, 500 mcg, Nebulization, BID, Giovana Villavicencio MD, 500 mcg at 03/02/22 0645    cloZAPine (CLOZARIL) tablet 50 mg, 50 mg, Oral, Nightly, Giovana Villavicencio MD, 50 mg at 03/01/22 2041    cycloSPORINE (SANDIMMUNE) capsule 100 mg, 100 mg, Oral, BID, Giovana Villavicencio MD, 100 mg at 03/02/22 0901    desvenlafaxine succinate (PRISTIQ) extended release tablet 50 mg, 50 mg, Oral, QPM, Giovana Villavicencio MD, 50 mg at 03/01/22 1715    docusate sodium (COLACE) capsule 100 mg, 100 mg, Oral, BID, Giovana Villavicencio MD, 100 mg at 07/00/61 1626    folic acid (FOLVITE) tablet 1 mg, 1 mg, Oral, Daily, Giovana Villavicencio MD, 1 mg at 03/02/22 0900    levothyroxine (SYNTHROID) tablet 50 mcg, 50 mcg, Oral, Daily, Giovana Villavicencio MD, 50 mcg at 03/02/22 1957    predniSONE (DELTASONE) tablet 5 mg, 5 mg, Oral, Daily, Clarisse Ramirez MD, 5 mg at 03/02/22 0900    mycophenolate (CELLCEPT) capsule 1,000 mg, 1,000 mg, Oral, BID, Clarisse Ramirez MD, 1,000 mg at 03/02/22 0900    [Held by provider] piperacillin-tazobactam (ZOSYN) 3,375 mg in dextrose 5 % 50 mL IVPB extended infusion (mini-bag), 3,375 mg, IntraVENous, Q8H, Mattie Fraga MD, Stopped at 03/01/22 0645    0.9 % sodium chloride bolus, 25 mL, IntraVENous, Q8H, Mattie Fraga MD, Stopped at 03/01/22 5510  ALLERGIES     Macrodantin [nitrofurantoin macrocrystal] and Sulfa antibiotics  Immunization History   Administered Date(s) Administered    COVID-19, Pfizer Purple top, DILUTE for use, 12+ yrs, 30mcg/0.3mL dose 01/28/2021, 02/18/2021, 08/18/2021      Internal Administration   First Dose COVID-19, [employer] top, DILUTE for use, 12+ yrs, 30mcg/0.3mL dose  2021   Second Dose COVID-19, Pfizer Purple top, DILUTE for use, 12+ yrs, 30mcg/0.3mL dose   2021       Last COVID Lab SARS-CoV-2 (no units)   Date Value   2021 Not Detected     SARS-CoV-2 Antibody, Total (no units)   Date Value   2022 Non-Reactive     SARS-CoV-2, PCR (no units)   Date Value   2022 Not Detected     SARS-CoV-2, NAAT (no units)   Date Value   2022 Not Detected        PAST MEDICAL HISTORY     Past Medical History:   Diagnosis Date    Abnormal EKG     left bundle branch block    Acute gout involving toe of right foot 2020    Acute on chronic combined systolic (congestive) and diastolic (congestive) heart failure (HCC)     Cancer (HCC)     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    Cerebrovascular disease     Clotting disorder ([address] 1985    thrombophlebitis after c section delivery    Depression     15 years followed by dr Cristin Rubio Hyperlipidemia     Hypertension     Hypothyroidism     Leg swelling 2020    Lymphedema of both lower extremities 2020    Peripheral vascular disease ([address]     Renal failure 2012    renal transplant    Thrombophlebitis     after c section delivery    Thyroid disease      SURGICAL HISTORY       Past Surgical History:   Procedure Laterality Date     SECTION  1985    one normal delivery    COLECTOMY N/A 1/15/2022    DIAGNOSTIC  LAPAROSCOPY LYSIS OF ADHESIONS performed by Keven Sanchez MD at [address] [place] [address] Drive CATH LAB PROCEDURE      normal coronaries and  normal coronaries    DIALYSIS FISTULA CREATION  march and 2012    phase one and two patient will start dialysis when needed   [address]    transfemoral venous bypass grafts    IR IVC FILTER PLACEMENT W IMAGING  2022    IR IVC FILTER PLACEMENT W IMAGING 2022 [facility] SPECIAL PROCEDURES    KIDNEY TRANSPLANT  2016    KIDNEY TRANSPLANT  2015    KIDNEY TRANSPLANT  2015    PARATHYROIDECTOMY  2006    left parathyroid  implant and parathyroidectomy    TRANSESOPHAGEAL ECHOCARDIOGRAM N/A 2/28/2022    TRANSESOPHAGEAL ECHOCARDIOGRAM WITH BUBBLE STUDY performed by Shelton Castro MD at [place] [address]     ·   From nursing facility     PHYSICAL EXAM          Vitals:    03/02/22 0645 03/02/22 0745 03/02/22 0957 03/02/22 1445   BP:  133/78  (!) 156/90   Pulse:  90  108   Resp:  18  16   Temp:  97.5 °F (36.4 °C)  98.6 °F (37 °C)   TempSrc:       SpO2: 99% 97% 96% 98%   Weight:       Height:         Physical Exam   Constitutional/General: awake and alert nad  Head: NC/AT  Eyes: PERRL, EOMI  Pulmonary: Lungs diminished to bases. ON RA off NC  Cardiovascular:  Regular rate and rhythm    Abdomen: Soft, + BS.    distension. Nontender. Extremities: Warm and well perfused   Left arm with fistula-   Skin: Warm and dry  No rash   Gluteal area with small open areas noted , excoriation   Jania-rectal area with discoloration/    Neurologic:    No focal deficits  Psych: Normal Affect  Chin   PIV     DIAGNOSTIC RESULTS   RADIOLOGY:   CT HEAD WO CONTRAST    Result Date: 2/25/2022  EXAMINATION: CT OF THE HEAD WITHOUT CONTRAST  2/25/2022 3:08 am TECHNIQUE: CT of the head was performed without the administration of intravenous contrast. Dose modulation, iterative reconstruction, and/or weight based adjustment of the mA/kV was utilized to reduce the radiation dose to as low as reasonably achievable. COMPARISON: Correlation with MRI dated 01/16/2022 HISTORY: ORDERING SYSTEM PROVIDED HISTORY: mental status change TECHNOLOGIST PROVIDED HISTORY: Has a \"code stroke\" or \"stroke alert\" been called? ->No Reason for exam:->mental status change Decision Support Exception - unselect if not a suspected or confirmed emergency medical condition->Emergency Medical Condition (MA) FINDINGS: BRAIN/VENTRICLES: There is no acute intracranial hemorrhage, mass effect or midline shift. No abnormal extra-axial fluid collection. The gray-white differentiation is maintained without evidence of an acute infarct. There is no evidence of hydrocephalus. Minimal cortical volume loss. Scattered vascular calcifications. ORBITS: The visualized portion of the orbits demonstrate no acute abnormality. SINUSES: Significant opacification of the left greater than right mastoid air cells similar to previous. Mild mucosal thickening in the left sphenoid sinus. This was also present previously. SOFT TISSUES/SKULL:  No acute abnormality of the visualized skull or soft tissues. No acute intracranial abnormality. MRI would be useful if symptoms persist. Inflammatory changes of left greater than right mastoid air cells and left sphenoid sinus similar to prior MRI. RECOMMENDATIONS: Unavailable     NM LUNG VENT/PERFUSION (VQ)    Result Date: 2/25/2022  EXAMINATION: NUCLEAR MEDICINE VENTILATION PERFUSION SCAN. 2/25/2022 TECHNIQUE: 3.5 millicuries aerosolized Tc99m DTPA was administered via mask prior to planar imaging of the lungs in multiple projections. Then, 4.2 millicuries of Tc 23H MAA was administered intravenously prior to planar imaging of the lungs in similar projections. COMPARISON: Chest CT February 25, 2022 . HISTORY: ORDERING SYSTEM PROVIDED HISTORY: R/O PE TECHNOLOGIST PROVIDED HISTORY: Reason for exam:->R/O PE What reading provider will be dictating this exam?->CRC FINDINGS: PERFUSION: Distribution of radiotracer is homogeneous. No segmental defects identified. VENTILATION: Ventilation images are unremarkable. CHEST CT: Small infiltrate or atelectasis posterior right lung. Negative for pulmonary embolus.      CT ABDOMEN PELVIS W IV CONTRAST Additional Contrast? None    Result Date: 2/25/2022  EXAMINATION: CT OF THE ABDOMEN AND PELVIS WITH CONTRAST 2/25/2022 3:08 am TECHNIQUE: CT of the abdomen and pelvis was performed with the administration of intravenous contrast. Multiplanar reformatted images are provided for review. Dose modulation, iterative reconstruction, and/or weight based adjustment of the mA/kV was utilized to reduce the radiation dose to as low as reasonably achievable. COMPARISON: 01/17/2022 HISTORY: ORDERING SYSTEM PROVIDED HISTORY: Abdominal distention, AMS TECHNOLOGIST PROVIDED HISTORY: Reason for exam:->Abdominal distention, AMS Additional Contrast?->None Decision Support Exception - unselect if not a suspected or confirmed emergency medical condition->Emergency Medical Condition (MA) FINDINGS: Many images are partially degraded by patient motion related artifact. Low-attenuation focus in the central aspect of the left lobe of the liver as well as a tiny low-attenuation focus in the caudate lobe. There is also a tiny low-attenuation focus in the extreme inferior aspect of the right lobe. These are difficult to definitively characterize due to their small size but likely represent small hepatic cysts. Gallbladder is unremarkable. Spleen is normal in size. No evidence of acute pancreatitis. There is a E small exophytic cystic appearing lesion along the anterior aspect of the body of the pancreas measuring 7 Hounsfield units and 1.4 cm. This is unchanged dating back to 11/17/2021. This is also favored to be incidental but could be followed. No adrenal mass. No hydronephrosis. The native kidneys remains significantly atrophic and contain numerous cystic lesions. Some of these are hyperdense but also unchanged dating back to the 11/17/2021 exam and favored to represent hyperdense or hemorrhagic cysts. Small renal cortical calcifications and or nonobstructing stones. A right lower quadrant renal transplant demonstrates no hydronephrosis. Small hiatal hernia. Mild fluid distention of a few small bowel loops in the upper abdomen. No bowel obstruction. The cecum is mobile.   The appendix is not identified with certainty. There is some localized thickening of the proximal ascending colon. This does appear to be new compared to the previous examination. Moderate stool in the distal colon. Residual contrast in the colon likely due to contrast administered on the prior examination. Uterus is atrophic. Urinary bladder is largely decompressed with Chin catheter. Minimal gas in the bladder likely due to the catheter. Partial visualization of femoral to femoral bypass graft. Degenerative changes are present in the spine and pelvis. Please see separate dictated report for CT of the chest.     Thickening of the proximal ascending colon. Given the short-term change, this is favored to represent a localized area of colitis likely infectious or inflammatory. Neoplasia thought less likely but follow-up to resolution would be recommended. Moderate stool in the distal colon. Minimal distention of several proximal small bowel loops likely incidental or due to mild enteritis. Small hiatal hernia. Other chronic appearing findings. RECOMMENDATIONS: Unavailable     IR GUIDED IVC FILTER PLACEMENT    Result Date: 2/26/2022  EXAMINATION: INFERIOR VENA CAVA (IVC) FILTER INSERTION 2/26/2022 Procedural Personnel: Julian Alfredo MD HISTORY: Indication: Bleeding with IV heparin ORDERING SYSTEM PROVIDED HISTORY: IVC filter TECHNOLOGIST PROVIDED HISTORY: Reason for exam:->IVC filter Anticoagulation contraindicated SEDATION: None CONTRAST: Contrast agent: Isovue 320 Contrast volume: 30mL FLUOROSCOPY DOSE AND TYPE OR TIME AND EXPOSURES: Fluoroscopy time/Number of Images: Fluoroscopy time 1.5 minutes. Reference air [place] kerma: [id number] PROCEDURE: Informed consent for the procedure including risks, benefits and alternatives was obtained and time-out was performed prior to the procedure. Universal protocol was followed.  A suitable skin site was prepared and draped using all elements of maximal sterile barrier technique including sterile gloves, sterile gown, cap, mask, large sterile sheet, sterile ultrasound probe cover, hand hygiene, and cutaneous antisepsis. Time-out was called and the patient's order and identity were verified. Local anesthesia was administered. The vessel was sonographically evaluated and judged appropriate for access. Real time ultrasound was used to visualize needle entry into the vessel and an ultrasound image was stored in PACS. Vein accessed: Right common femoral vein. Access technique: Micropuncture set with 21 gauge needle A 0.035 guide wire was used to place a catheter into the inferior vena cava. A vena cavogram was performed. A [employer] filter was deployed in routine fashion in the infrarenal IVC under fluoroscopic guidance. The sheath was removed and hemostasis was achieved with manual compression. A sterile dressing was applied. Patient tolerated procedure well. Complications: No immediate complications. Standardized report: [id number]_[email or web address] [id number] FINDINGS: US: The access vein is patent. Inferior Vena Cavogram: The vena cava is patent and normal in size without thrombus or anomalies. Post-placement imaging: Spot filmdemonstrates the filter in the infrarenalvena cava with less than 15 degrees tilt from the vena cava access. Successful vena cava filter placement. PLAN: Retrieval intent (MIPS): The filter is potentially retrievable. Plan/Timing For Retrieval: Ordering Physician/Contact For Retrieval Plan: Harika Dominguez     XR CHEST 1 VIEW    Result Date: 2/25/2022  EXAMINATION: ONE XRAY VIEW OF THE CHEST 2/25/2022 2:49 pm COMPARISON: None. HISTORY: ORDERING SYSTEM PROVIDED HISTORY: needs to be done with VQ scan TECHNOLOGIST PROVIDED HISTORY: Last chest Xray done more than 24 hours ago Reason for exam:->needs to be done with VQ scan FINDINGS: The heart is mildly enlarged. There are no findings of failure. The lungs are clear. There is no focal infiltrate or effusion. .     1. Mild cardiomegaly. There is no evidence of failure or pneumonia. CTA PULMONARY W CONTRAST    Result Date: 2/25/2022  EXAMINATION: CTA OF THE CHEST 2/25/2022 3:08 am TECHNIQUE: CTA of the chest was performed after the administration of intravenous contrast.  Multiplanar reformatted images are provided for review. MIP images are provided for review. Dose modulation, iterative reconstruction, and/or weight based adjustment of the mA/kV was utilized to reduce the radiation dose to as low as reasonably achievable. COMPARISON: Portable chest dated 01/22/2022 and CT dated 01/17/2022 HISTORY: ORDERING SYSTEM PROVIDED HISTORY: Hypoxic, SOB TECHNOLOGIST PROVIDED HISTORY: Reason for exam:->Hypoxic, SOB Decision Support Exception - unselect if not a suspected or confirmed emergency medical condition->Emergency Medical Condition (MA) FINDINGS: Pulmonary Arteries: Assessment is limited due to patient motion artifact. A small or peripheral embolism would be difficult to exclude but no large or central embolism identified. Mediastinum: Moderately severe cardiomegaly is similar to previous. No pericardial effusion. No aortic dissection. Scattered vascular calcifications. Normal-size lymph nodes without adenopathy by size criteria. Lungs/pleura: No pneumothorax. Mild atelectasis in the right greater than left lung base. Otherwise, there has been significant improvement in aeration of the lungs since previous. Upper Abdomen: Partial visualization of the upper right kidney with cystic appearing foci as well as a partially visualized hyperdense lesion likely representing a hyperdense proteinaceous cyst and similar to previous but continued follow-up would be useful. Visualized portions of the upper abdomen demonstrate no acute abnormality. Small hiatal hernia. Soft Tissues/Bones: Degenerative changes are scattered in the spine. Allowing for patient motion artifact, no identified pulmonary embolism.  Significant improvement in aeration of the lungs with some residual areas of presumed atelectasis in the right greater than left base. Cardiomegaly. RECOMMENDATIONS: Unavailable     IR ULTRASOUND GUIDANCE VASCULAR ACCESS    Result Date: 2/26/2022  EXAMINATION: INFERIOR VENA CAVA (IVC) FILTER INSERTION 2/26/2022 Procedural Personnel: Adam Recinos MD HISTORY: Indication: Bleeding with IV heparin ORDERING SYSTEM PROVIDED HISTORY: IVC filter TECHNOLOGIST PROVIDED HISTORY: Reason for exam:->IVC filter Anticoagulation contraindicated SEDATION: None CONTRAST: Contrast agent: Isovue 320 Contrast volume: 30mL FLUOROSCOPY DOSE AND TYPE OR TIME AND EXPOSURES: Fluoroscopy time/Number of Images: Fluoroscopy time 1.5 minutes. Reference [facility] kerma: [id number] PROCEDURE: Informed consent for the procedure including risks, benefits and alternatives was obtained and time-out was performed prior to the procedure. Universal protocol was followed. A suitable skin site was prepared and draped using all elements of maximal sterile barrier technique including sterile gloves, sterile gown, cap, mask, large sterile sheet, sterile ultrasound probe cover, hand hygiene, and cutaneous antisepsis. Time-out was called and the patient's order and identity were verified. Local anesthesia was administered. The vessel was sonographically evaluated and judged appropriate for access. Real time ultrasound was used to visualize needle entry into the vessel and an ultrasound image was stored in PACS. Vein accessed: Right common femoral vein. Access technique: Micropuncture set with 21 gauge needle A 0.035 guide wire was used to place a catheter into the inferior vena cava. A vena cavogram was performed. A [employer] filter was deployed in routine fashion in the infrarenal IVC under fluoroscopic guidance. The sheath was removed and hemostasis was achieved with manual compression. A sterile dressing was applied. Patient tolerated procedure well.  Complications: No immediate complications. Standardized report: [id number] [id number] FINDINGS: US: The access vein is patent. Inferior Vena Cavogram: The vena cava is patent and normal in size without thrombus or anomalies. Post-placement imaging: Spot filmdemonstrates the filter in the infrarenalvena cava with less than 15 degrees tilt from the vena cava access. Successful vena cava filter placement. PLAN: Retrieval intent (MIPS): The filter is potentially retrievable. Plan/Timing For Retrieval: Ordering Physician/Contact For Retrieval Plan: Rama Navarro     [place] MODIFIED BARIUM SWALLOW W VIDEO    Result Date: 2/26/2022  EXAMINATION: MODIFIED BARIUM SWALLOW WAS PERFORMED IN CONJUNCTION WITH SPEECH PATHOLOGY SERVICES TECHNIQUE: Fluoroscopic evaluation of the swallowing mechanism was performed using cineradiography with multiple consistency of barium product in conjunction with speech pathology services. FLUOROSCOPY DOSE AND TYPE OR TIME AND EXPOSURES: Fluoroscopy time 2.1 minutes. Air kerma 5.49 mGy COMPARISON: None HISTORY: ORDERING SYSTEM PROVIDED HISTORY: dysphagia, aspriation pna? TECHNOLOGIST PROVIDED HISTORY: Reason for exam:->dysphagia, aspriation pna? FINDINGS: There was oral delay and pharyngeal delay. Laryngeal elevation was adequate. There was retention in the piriform sinuses but not the vallecula. Transient laryngeal penetration is seen with thin liquid barium. Otherwise, no aspiration or laryngeal penetration is seen. Strands it laryngeal penetration with thin liquid barium. No evidence of aspiration. Please see separate speech pathology report for full discussion of findings and recommendations.  RECOMMENDATIONS: Unavailable     US DUP LOWER EXTREMITIES BILATERAL VENOUS    Result Date: 2/25/2022  EXAMINATION: DUPLEX VENOUS ULTRASOUND OF THE BILATERAL LOWER EXTREMITIES2/25/2022 2:47 pm TECHNIQUE: Duplex ultrasound using B-mode/gray scaled imaging, Doppler spectral analysis and color flow Doppler the last 72 hours. Lab Results   Component Value Date    CRP 18.3 (H) 01/23/2022    CRP 34.6 (H) 11/17/2021     Lab Results   Component Value Date    SEDRATE 61 (H) 01/23/2022    SEDRATE 62 (H) 11/20/2021     No results found for: [id number]  Lab Results   Component Value Date    COVID19 Not Detected 03/02/2022    COVID19 Not Detected 02/25/2022     COVID-19/MARINA-COV2 LABS  Recent Labs     02/28/22  1350 03/01/22  0527 03/02/22  1133   INR  --  1.1  --    PROTIME  --  12.4  --    AST 12 10 12   ALT 8 8 11     MICROBIOLOGY:       Lab Results   Component Value Date    BC 24 Hours no growth 02/27/2022    BC  02/25/2022     This organism was isolated in one set. Susceptibility testing is not routinely done as this  organism frequently represents skin contamination. Additional testing can be ordered by calling the  Microbiology Department.       BC 5 Days no growth 01/23/2022    ORG Staphylococcus coagulase-negative 02/25/2022    ORG Enterococcus faecalis 01/12/2022    ORG Klebsiella pneumoniae ssp pneumoniae 11/17/2021    ORG Staphylococcus coagulase-negative 11/17/2021    ORG Klebsiella pneumoniae ssp pneumoniae 11/17/2021     Lab Results   Component Value Date    BLOODCULT2 5 Days no growth 02/25/2022    BLOODCULT2 5 Days no growth 01/24/2022    BLOODCULT2 5 Days no growth 01/21/2022    ORG Staphylococcus coagulase-negative 02/25/2022    ORG Enterococcus faecalis 01/12/2022    ORG Klebsiella pneumoniae ssp pneumoniae 11/17/2021    ORG Staphylococcus coagulase-negative 11/17/2021    ORG Klebsiella pneumoniae ssp pneumoniae 11/17/2021     WOUND/ABSCESS   Date Value Ref Range Status   01/24/2022 Growth not present  Final     Urine Culture, Routine   Date Value Ref Range Status   02/26/2022 Growth not present  Final   01/23/2022 Growth not present  Final   01/12/2022 >100,000 CFU/ml  Final     MRSA Culture Only   Date Value Ref Range Status   11/17/2021 Methicillin resistant Staph aureus not isolated  Final     FINAL IMPRESSION    Patient is a 73 y.o. female who presented with   Chief Complaint   Patient presents with    Altered Mental Status     hx of bipolar, Hx liver failure/renal failure, No history of AMS, no HX of dialysis, was hypoxic at [facility] 76% on room air was placed on 3L NC 93%. and admitted for Dehydration [E86.0]  Colitis [K52.9]  Hypoxia [R09.02]  Elevated troponin [R77.8]  Acute respiratory failure with hypoxia (HCC) [J96.01]  Altered mental status, unspecified altered mental status type [R41.82]   Chart reviewed respiratory failure h/o covid Aspiration pneumonia  cons bacteremia ? contaminant rosibel neg   ckd/kidney transplant on immunosuppressives  Gi seeing for colitis/anemia  Pulm following  Left le dvt/thrombus s/p IVCF   Perianal lesion? Surgery eval ?    · History of vena cava filter and AV fistula   · Klebsiella and CONS bacteremia in 11/17/2021  · Coccyx wound? PERIRECTAL LESION    Plan:       amoxicillin-clavulanate (AUGMENTIN) 875-125 MG per tablet 1 tablet, 2 times per day     vancomycin (VANCOCIN) intermittent dosing (placeholder), RX Placeholder stop on d/c      · Vancomycin per pharamcy     · Repeat blood cultures- ngtd   · ROSIBEL  Noted neg   · fungitell  Neg   · General surgery to evaluate mckenzie-rectal area FOLLOWING  · Request records from [facility]  · Can d/c on Augmentin cover poss aspiration colitis  · Stop vanco   · F/u 4 weeks     Imaging and labs were reviewed. Thank you for involving me in the care of No Cottrell. Please do not hesitate to call for any questions or concerns.     Electronically signed by Rick De MD on 3/2/2022 at 4:04 PM

## 2022-03-02 NOTE — PROGRESS NOTES
[facility] MD Lian Mccallum, MD Bridgett Hercules, MD Cecile Sin, MD Carlos Beckman, RAHAT [place]enrostro, ZIARE  Progress note  Patient's Name: Spenser Dolan  3:05 PM  3/2/2022    3/1 : Seen in her room , on RA . Alert oriented , appear comfortable , euvolemic    History of Present Ilness: This is 73years old  lady well-known to our group with multimedical problems presented to the hospital with shortness of breath excessively with acute respiratory failure with hypoxia, patient has mild medical problems including history of CKD status post kidney transplant in  she has acute on chronic combined systolic and diastolic congestive heart failure, history of gout, history of bundle branch block, history of depression, hyperlipidemia, hypertension, hypothyroidism, history of lymphedema both lower extremities,  Patient is Covid negative and currently she is placed on BiPAP for oxygenation.     Past Medical History:   Diagnosis Date    Abnormal EKG     left bundle branch block    Acute gout involving toe of right foot 2020    Acute on chronic combined systolic (congestive) and diastolic (congestive) heart failure (HCC)     Cancer (HCC)     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    Cerebrovascular disease     Clotting disorder ([address] 1985    thrombophlebitis after c section delivery    Depression     15 years followed by dr Jeromy Hubbard Hyperlipidemia     Hypertension     Hypothyroidism     Leg swelling 2020    Lymphedema of both lower extremities 2020    Peripheral vascular disease ([address]     Renal failure 2012    renal transplant    Thrombophlebitis     after c section delivery    Thyroid disease        Past Surgical History:   Procedure Laterality Date     SECTION  1985    one normal delivery    COLECTOMY N/A 1/15/2022    DIAGNOSTIC  LAPAROSCOPY LYSIS OF ADHESIONS performed by Alma Resendez, MD at [address] [place] [address] Drive CATH LAB PROCEDURE  2006    normal coronaries and 1996 normal coronaries    DIALYSIS FISTULA CREATION  march and july 2012    phase one and two patient will start dialysis when needed   [address]    transfemoral venous bypass grafts    IR IVC FILTER PLACEMENT W IMAGING  2/26/2022    IR IVC FILTER PLACEMENT W IMAGING 2/26/2022 [id number] SPECIAL PROCEDURES    KIDNEY TRANSPLANT  2016    KIDNEY TRANSPLANT  2015    KIDNEY TRANSPLANT  2015    PARATHYROIDECTOMY  2006    left parathyroid  implant and parathyroidectomy    TRANSESOPHAGEAL ECHOCARDIOGRAM N/A 2/28/2022    TRANSESOPHAGEAL ECHOCARDIOGRAM WITH BUBBLE STUDY performed by Sebastian Jensen MD at [address] [place] [address] History   Problem Relation Age of Onset    Diabetes Mother     Diabetes Father     Dementia Father         reports that she quit smoking about 6 years ago. She has a 20.00 pack-year smoking history. She has never used smokeless tobacco. She reports that she does not drink alcohol and does not use drugs.     Allergies:  Macrodantin [nitrofurantoin macrocrystal] and Sulfa antibiotics    Current Medications:    pantoprazole (PROTONIX) tablet 40 mg, BID AC  apixaban (ELIQUIS) tablet 5 mg, BID  polyethylene glycol (GLYCOLAX) packet 17 g, BID PRN  amoxicillin-clavulanate (AUGMENTIN) 875-125 MG per tablet 1 tablet, 2 times per day  sodium chloride flush 0.9 % injection 5-40 mL, 2 times per day  0.9 % sodium chloride infusion, PRN  heparin flush 100 UNIT/ML injection 100 Units, PRN  lidocaine PF 1 % injection 5 mL, Once  sodium chloride flush 0.9 % injection 5-40 mL, 2 times per day  0.9 % sodium chloride infusion, PRN  heparin flush 100 UNIT/ML injection 300 Units, 2 times per day  heparin flush 100 UNIT/ML injection 300 Units, PRN  vancomycin (VANCOCIN) intermittent dosing (placeholder), RX Placeholder  sodium chloride flush 0.9 % injection 5-40 mL, PRN  0.9 % sodium chloride infusion, PRN  heparin flush 100 UNIT/ML injection 100 Units, PRN  sodium chloride flush 0.9 % injection 5-40 mL, PRN  0.9 % sodium chloride infusion, PRN  ondansetron (ZOFRAN-ODT) disintegrating tablet 4 mg, Q8H PRN   Or  ondansetron (ZOFRAN) injection 4 mg, Q6H PRN  acetaminophen (TYLENOL) tablet 650 mg, Q6H PRN   Or  acetaminophen (TYLENOL) suppository 650 mg, Q6H PRN  ipratropium-albuterol (DUONEB) nebulizer solution 1 ampule, 4x daily  allopurinol (ZYLOPRIM) tablet 100 mg, Daily  budesonide (PULMICORT) nebulizer suspension 500 mcg, BID  cloZAPine (CLOZARIL) tablet 50 mg, Nightly  cycloSPORINE (SANDIMMUNE) capsule 100 mg, BID  desvenlafaxine succinate (PRISTIQ) extended release tablet 50 mg, QPM  docusate sodium (COLACE) capsule 586 mg, BID  folic acid (FOLVITE) tablet 1 mg, Daily  levothyroxine (SYNTHROID) tablet 50 mcg, Daily  predniSONE (DELTASONE) tablet 5 mg, Daily  mycophenolate (CELLCEPT) capsule 1,000 mg, BID  [Held by provider] piperacillin-tazobactam (ZOSYN) 3,375 mg in dextrose 5 % 50 mL IVPB extended infusion (mini-bag), Q8H  0.9 % sodium chloride bolus, Q8H        Review of Systems:       Physical exam:   Vital signs stable afebrile  GEN appearance, patient somnolent, hardly reactive to my verbal stimulation  Head and ENT; normocephalic/atraumatic/supple neck/no JVD  Lungs; anterior rhonchi  Heart; regular rate and rhythm  Abdomen; obese no tenderness  Extremities bilateral swelling both lower extremities  Neurologic; somnolent    Data:   Labs:  CBC with Differential:    Lab Results   Component Value Date    WBC 7.3 03/02/2022    RBC 2.98 03/02/2022    HGB 9.4 03/02/2022    HCT 32.2 03/02/2022     03/02/2022    .1 03/02/2022    MCH 31.5 03/02/2022    MCHC 29.2 03/02/2022    RDW 16.7 03/02/2022    NRBC 1.0 01/13/2022    METASPCT 1.8 02/28/2022    LYMPHOPCT 12.0 03/02/2022    MONOPCT 3.0 03/02/2022    MYELOPCT 3.0 03/02/2022    BASOPCT 0.0 03/02/2022    MONOSABS 0.22 03/02/2022 LYMPHSABS 0.88 03/02/2022    EOSABS 0.00 03/02/2022    BASOSABS 0.00 03/02/2022     CMP:    Lab Results   Component Value Date     03/02/2022    K 4.1 03/02/2022    K 4.7 02/26/2022     03/02/2022    CO2 22 03/02/2022    BUN 22 03/02/2022    CREATININE 1.3 03/02/2022    GFRAA 50 03/02/2022    LABGLOM 41 03/02/2022    GLUCOSE 114 03/02/2022    PROT 5.4 03/02/2022    LABALBU 2.7 03/02/2022    CALCIUM 10.4 03/02/2022    BILITOT 0.5 03/02/2022    ALKPHOS 80 03/02/2022    AST 12 03/02/2022    ALT 11 03/02/2022     Ionized Calcium:  No results found for: IONCA  Magnesium:    Lab Results   Component Value Date    MG 1.9 03/01/2022     Phosphorus:    Lab Results   Component Value Date    PHOS 2.9 03/01/2022     U/A:    Lab Results   Component Value Date    COLORU Yellow 02/26/2022    PHUR 5.0 02/26/2022    WBCUA 2-5 02/26/2022    RBCUA 5-10 02/26/2022    RBCUA 10-20 03/12/2014    YEAST Present 04/09/2021    BACTERIA FEW 02/26/2022    CLARITYU Clear 02/26/2022    SPECGRAV 1.015 02/26/2022    LEUKOCYTESUR TRACE 02/26/2022    UROBILINOGEN 0.2 02/26/2022    BILIRUBINUR Negative 02/26/2022    BLOODU LARGE 02/26/2022    GLUCOSEU Negative 02/26/2022     Microalbumen/Creatinine ratio:  No components found for: RUCREAT  Iron Saturation:  No components found for: PERCENTFE  TIBC:    Lab Results   Component Value Date    TIBC 135 01/19/2022     FERRITIN:    Lab Results   Component Value Date    FERRITIN 4,062 01/23/2022        Imaging:  US DUP UPPER EXTREMITY LEFT VENOUS   Final Result   No evidence of DVT. IR GUIDED IVC FILTER PLACEMENT   Final Result   Successful vena cava filter placement. PLAN:   Retrieval intent (MIPS): The filter is potentially retrievable. Plan/Timing For Retrieval:      Ordering Physician/Contact For Retrieval Plan: [facility]         IR ULTRASOUND GUIDANCE VASCULAR ACCESS   Final Result   Successful vena cava filter placement. PLAN:   Retrieval intent (MIPS):  The filter is potentially retrievable. Plan/Timing For Retrieval:      Ordering Physician/Contact For Retrieval Plan: Kathleen GUERRIER MODIFIED BARIUM SWALLOW W VIDEO   Final Result   Strands it laryngeal penetration with thin liquid barium. No evidence of   aspiration. Please see separate speech pathology report for full discussion of findings   and recommendations. RECOMMENDATIONS:   Unavailable         US DUP LOWER EXTREMITIES BILATERAL VENOUS   Final Result   1. INCOMPLETELY OCCLUSIVE THROMBI in the mid and distal LEFT femoral vein and   within the popliteal vein. They have developed in the interim since the   previous study from 11/17/2021.   2. No evidence of DVT in the right lower extremity. RECOMMENDATIONS:   Unavailable         NM LUNG VENT/PERFUSION (VQ)   Final Result   Negative for pulmonary embolus. XR CHEST 1 VIEW   Final Result   1. Mild cardiomegaly. There is no evidence of failure or pneumonia. CT HEAD WO CONTRAST   Final Result   No acute intracranial abnormality. MRI would be useful if symptoms persist.      Inflammatory changes of left greater than right mastoid air cells and left   sphenoid sinus similar to prior MRI. RECOMMENDATIONS:   Unavailable         CTA PULMONARY W CONTRAST   Final Result   Allowing for patient motion artifact, no identified pulmonary embolism. Significant improvement in aeration of the lungs with some residual areas of   presumed atelectasis in the right greater than left base. Cardiomegaly. RECOMMENDATIONS:   Unavailable         CT ABDOMEN PELVIS W IV CONTRAST Additional Contrast? None   Final Result   Thickening of the proximal ascending colon. Given the short-term change,   this is favored to represent a localized area of colitis likely infectious or   inflammatory. Neoplasia thought less likely but follow-up to resolution   would be recommended. Moderate stool in the distal colon.       Minimal distention of several proximal small bowel loops likely incidental or   due to mild enteritis. Small hiatal hernia. Other chronic appearing findings. RECOMMENDATIONS:   Unavailable         IR FLUORO GUIDED CVA DEVICE PLMT/REPLACE/REMOVAL    (Results Pending)   IR ULTRASOUND GUIDANCE VASCULAR ACCESS    (Results Pending)       Assessment    -Hx of  donor kidney transplant :  Allograft function at baseline 1.1-1.6 with a lot of fluctuation . Did receive iv dye during ivc filter and cta . Continue conservative iv fluids till am   Cr not far away from baseline     -Immunosuppression :   Pt clinically not septic   Continue MMF 1000 mg bid   Continue cyclopsorin 100 mg bid   Continue prednsion 5 mg daily   Defer PCP ppx to ID .     -Acute DVT of LLE :   S/p ivc filter   Restarted on eliquis today     -Anaemia :  Acute/chronic   GI on board with plan for EGD     -Acute hypoxic respiratory failure :  Felt to be due to aspiration pneumonitis on top of underlying respiratory disease due to JORGE .            Electronically signed by Florentino Mccarthy MD on 3/2/2022 at 3:05 PM

## 2022-03-03 NOTE — PROGRESS NOTES
Nurse to nurse give to [facility]. Physicians transport here at this time to take patient. All leads/lines removed.

## 2022-03-03 NOTE — PROGRESS NOTES
INPATIENT CARDIOLOGY Follow UP    Name: Garth Castro    Age: 73 y.o. Date of Admission: 2/25/2022  1:08 AM    Date of Service: 3/2/2022      Referring Physician: Karyna Segovia MD      Subjective: Report feels better today.         Past Medical History:  Past Medical History:   Diagnosis Date    Abnormal EKG     left bundle branch block    Acute gout involving toe of right foot 09/03/2020    Acute on chronic combined systolic (congestive) and diastolic (congestive) heart failure (HCC)     Cancer (HCC)     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    Cerebrovascular disease     Clotting disorder ([facility] [address] 1985    thrombophlebitis after c section delivery    Depression     15 years followed by dr Kyung Castillo Hyperlipidemia     Hypertension     Hypothyroidism     Leg swelling 09/03/2020    Lymphedema of both lower extremities 09/03/2020    Peripheral vascular disease ([facility] [address]     Renal failure 11/2012    renal transplant    Thrombophlebitis     after c section delivery    Thyroid disease        Past Surgical History:  Past Surgical History:   Procedure Laterality Date   [address]    one normal delivery    COLECTOMY N/A 1/15/2022    DIAGNOSTIC  LAPAROSCOPY LYSIS OF ADHESIONS performed by Josemanuel Morton MD at [address] [place] [address] Drive CATH LAB PROCEDURE  2006    normal coronaries and 1996 normal coronaries    DIALYSIS FISTULA CREATION  march and july 2012    phase one and two patient will start dialysis when needed   [address]    transfemoral venous bypass grafts    IR IVC FILTER PLACEMENT W IMAGING  2/26/2022    IR IVC FILTER PLACEMENT W IMAGING 2/26/2022 [facility] PROCED[facility]    KIDNEY TRANSPLANT  2016    KIDNEY TRANSPLANT  2015    KIDNEY TRANSPLANT  2015    PARATHYROIDECTOMY  2006    left parathyroid  implant and parathyroidectomy    TRANSESOPHAGEAL ECHOCARDIOGRAM N/A 2/28/2022    TRANSESOPHAGEAL ECHOCARDIOGRAM WITH BUBBLE Abused: Not on file    Sexually Abused: Not on file   Housing Stability:     Unable to Pay for Housing in the Last Year: Not on file    Number of Places Lived in the Last Year: Not on file    Unstable Housing in the Last Year: Not on file       Allergies: Allergies   Allergen Reactions    Macrodantin [Nitrofurantoin Macrocrystal]     Sulfa Antibiotics        Home Medications:  Prior to Admission medications    Medication Sig Start Date End Date Taking?  Authorizing Provider   pantoprazole (PROTONIX) 40 MG tablet Take 1 tablet by mouth every morning (before breakfast) 3/2/22  Yes KATINA Holman CNP   amoxicillin-clavulanate (AUGMENTIN) 875-125 MG per tablet Take 1 tablet by mouth every 12 hours for 10 days 3/2/22 3/12/22 Yes Quita Gottron, MD   apixaban (ELIQUIS) 5 MG TABS tablet Take 5 mg by mouth 2 times daily Take in the morning and at bedtime for 3 months (started 2/19/2022)   Yes Historical Provider, MD   atorvastatin (LIPITOR) 10 MG tablet Take 10 mg by mouth daily   Yes Historical Provider, MD   lisinopril (PRINIVIL;ZESTRIL) 5 MG tablet take 1 tablet by mouth once daily 12/8/21  Yes Historical Provider, MD   pregabalin (LYRICA) 75 MG capsule  12/26/21  Yes Historical Provider, MD   allopurinol (ZYLOPRIM) 100 MG tablet Take 100 mg by mouth daily   Yes Historical Provider, MD   metoprolol succinate (TOPROL XL) 50 MG extended release tablet take 1 tablet by mouth once daily 4/17/20  Yes Abdullahi Urban MD   desvenlafaxine succinate (PRISTIQ) 50 MG TB24 extended release tablet Take 1 tablet by mouth every evening 12/20/19  Yes Patricia Guthrie MD   cloZAPine (CLOZARIL) 50 MG tablet Take 50 mg by mouth nightly   Yes Historical Provider, MD   cycloSPORINE (SANDIMMUNE) 100 MG capsule Take 100 mg by mouth 2 times daily   Yes Historical Provider, MD   predniSONE (DELTASONE) 5 MG tablet Take 5 mg by mouth daily   Yes Historical Provider, MD   folic acid (FOLVITE) 1 MG tablet Take 1 mg by mouth daily   Yes Historical Provider, MD   docusate sodium (COLACE) 100 MG capsule Take 100 mg by mouth 2 times daily   Yes Historical Provider, MD   mycophenolate (CELLCEPT) 500 MG tablet Take 1,000 mg by mouth 2 times daily   Yes Historical Provider, MD   levothyroxine (SYNTHROID) 50 MCG tablet Take 50 mcg by mouth daily. Yes Historical Provider, MD   nystatin (MYCOSTATIN) 619958 UNIT/GM powder Apply 3 times daily.  12/27/21   Josh Boogie, APRN - CNP   ipratropium-albuterol (DUONEB) 0.5-2.5 (3) MG/3ML SOLN nebulizer solution Inhale 3 mLs into the lungs every 6 hours as needed for Shortness of Breath 11/27/21   Claudette Ramirez MD   budesonide (PULMICORT) 0.5 MG/2ML nebulizer suspension Take 2 mLs by nebulization 2 times daily for 10 days 11/27/21 12/7/21  Bautista Issa MD   ondansetron (ZOFRAN-ODT) 4 MG disintegrating tablet Take 1 tablet by mouth every 8 hours as needed for Nausea or Vomiting 11/27/21   Bautista Issa MD   Cyanocobalamin (B-12 COMPLIANCE INJECTION) 1000 MCG/ML KIT Inject as directed monthly    Historical Provider, MD       Current Medications:  Current Facility-Administered Medications   Medication Dose Route Frequency Provider Last Rate Last Admin    pantoprazole (PROTONIX) tablet 40 mg  40 mg Oral BID AC Jeovany Hankins, DO   40 mg at 03/02/22 1647    apixaban (ELIQUIS) tablet 5 mg  5 mg Oral BID Jeovany Hankins DO   5 mg at 03/02/22 0925    polyethylene glycol (GLYCOLAX) packet 17 g  17 g Oral BID PRN Georgina Eubanks MD        amoxicillin-clavulanate (AUGMENTIN) 875-125 MG per tablet 1 tablet  1 tablet Oral 2 times per day Bre Jeff, DO   1 tablet at 03/02/22 0900    sodium chloride flush 0.9 % injection 5-40 mL  5-40 mL IntraVENous 2 times per day Jeovany Hankins DO   10 mL at 02/28/22 2326    0.9 % sodium chloride infusion  25 mL IntraVENous PRN Jeovany Hankins DO        heparin flush 100 UNIT/ML injection 100 Units  1 mL IntraCATHeter PRN Bre Jeff, DO  lidocaine PF 1 % injection 5 mL  5 mL IntraDERmal Once Media Simpers, DO        sodium chloride flush 0.9 % injection 5-40 mL  5-40 mL IntraVENous 2 times per day Media Simpers, DO   10 mL at 02/28/22 2326    0.9 % sodium chloride infusion  25 mL IntraVENous PRN Jeovany Hankins, DO        heparin flush 100 UNIT/ML injection 300 Units  3 mL IntraVENous 2 times per day Media Simpers, DO        heparin flush 100 UNIT/ML injection 300 Units  3 mL IntraCATHeter PRN Media Simpers, DO        vancomycin (VANCOCIN) intermittent dosing (placeholder)   Other RX Placeholder Sandra Keenan MD        sodium chloride flush 0.9 % injection 5-40 mL  5-40 mL IntraVENous PRN Cristy Chavez APRN - CNP   10 mL at 02/27/22 2205    0.9 % sodium chloride infusion  25 mL IntraVENous PRN Cristy Chavez, APRN - CNP        heparin flush 100 UNIT/ML injection 100 Units  1 mL IntraCATHeter PRN Cristy Chavez APRN - CNP        sodium chloride flush 0.9 % injection 5-40 mL  5-40 mL IntraVENous PRN Susannah Ramirez MD        0.9 % sodium chloride infusion  25 mL IntraVENous PRN Noreen Ramirez MD        ondansetron (ZOFRAN-ODT) disintegrating tablet 4 mg  4 mg Oral Q8H PRN Susannah Ramirez MD        Or    ondansetron (ZOFRAN) injection 4 mg  4 mg IntraVENous Q6H PRN Noreen Ramirez MD        acetaminophen (TYLENOL) tablet 650 mg  650 mg Oral Q6H PRN Karyna Segovia MD   650 mg at 02/27/22 3815    Or    acetaminophen (TYLENOL) suppository 650 mg  650 mg Rectal Q6H PRN Karyna Segovia MD        ipratropium-albuterol (DUONEB) nebulizer solution 1 ampule  1 ampule Inhalation 4x daily Karyna Segovia MD   1 ampule at 03/02/22 1722    allopurinol (ZYLOPRIM) tablet 100 mg  100 mg Oral Daily Susannah Ramirez MD   100 mg at 03/02/22 0902    budesonide (PULMICORT) nebulizer suspension 500 mcg  500 mcg Nebulization BID Noreen Ramirez MD   500 mcg at 03/02/22 1722    cloZAPine (CLOZARIL) tablet 50 mg  50 mg Oral Nightly Keyon Escobedo MD   50 mg at 03/01/22 2041    cycloSPORINE (SANDIMMUNE) capsule 100 mg  100 mg Oral BID Keyon Escobedo MD   100 mg at 03/02/22 0901    desvenlafaxine succinate (PRISTIQ) extended release tablet 50 mg  50 mg Oral QPM Keyon Escobedo MD   50 mg at 03/02/22 1737    docusate sodium (COLACE) capsule 100 mg  100 mg Oral BID Keyon Escobedo MD   100 mg at 56/57/74 4081    folic acid (FOLVITE) tablet 1 mg  1 mg Oral Daily Alec Ramirez MD   1 mg at 03/02/22 0900    levothyroxine (SYNTHROID) tablet 50 mcg  50 mcg Oral Daily Keyon Escobedo MD   50 mcg at 03/02/22 4647    predniSONE (DELTASONE) tablet 5 mg  5 mg Oral Daily Alec Ramirez MD   5 mg at 03/02/22 0900    mycophenolate (CELLCEPT) capsule 1,000 mg  1,000 mg Oral BID Keyon Escobedo MD   1,000 mg at 03/02/22 0900    [Held by provider] piperacillin-tazobactam (ZOSYN) 3,375 mg in dextrose 5 % 50 mL IVPB extended infusion (mini-bag)  3,375 mg IntraVENous Q8H Chip Gonzalez MD   Stopped at 03/01/22 0645    0.9 % sodium chloride bolus  25 mL IntraVENous Q8H Chip Gonzalez MD   Stopped at 03/01/22 0323      sodium chloride      sodium chloride      sodium chloride      sodium chloride         Physical Exam:  BP (!) 156/90   Pulse 108   Temp 98.6 °F (37 °C)   Resp 16   Ht 5' 2\" (1.575 m)   Wt 220 lb (99.8 kg)   SpO2 96%   BMI 40.24 kg/m²   Wt Readings from Last 3 Encounters:   02/25/22 220 lb (99.8 kg)   01/19/22 216 lb (98 kg)   11/17/21 227 lb 3.2 oz (103.1 kg)       Appearance: Alert and oriented x3 not in acute distress.   Skin: Dry skin  Head: Atraumatic  Eyes: Intact extraocular muscles   ENMT: Mucous membranes are moist  Neck: Supple  Lungs: Clear to auscultation  Cardiac: Normal S1 and S2  Abdomen: Protuberant  Extremities: Intact range of motion  Neurologic: No focal neurological deficits  Peripheral Pulses: 2+ peripheral pulses    Intake/Output:    Intake/Output Summary (Last 24 hours) at 3/2/2022 1938  Last data filed at 3/2/2022 1741  Gross per 24 hour   Intake 300 ml   Output 1200 ml   Net -900 ml     No intake/output data recorded. Laboratory Tests:  Recent Labs     02/28/22  1350 03/01/22  0527 03/02/22  1133    139 140   K 4.3 4.1 4.1    108* 107   CO2 23 21* 22   BUN 25* 25* 22   CREATININE 1.6* 1.6* 1.3*   GLUCOSE 81 80 114*   CALCIUM 9.6 9.7 10.4*     Lab Results   Component Value Date    MG 1.9 03/01/2022    MG 2.1 02/28/2022    MG 1.5 02/27/2022     Recent Labs     02/28/22  1350 03/01/22  0527 03/02/22  1133   ALKPHOS 75 72 80   ALT 8 8 11   AST 12 10 12   PROT 5.2* 5.1* 5.4*   BILITOT 0.6 0.6 0.5   LABALBU 2.5* 2.6* 2.7*     Recent Labs     02/28/22  1350 02/28/22  2307 03/01/22  0527 03/01/22  1101 03/02/22  1133   WBC 5.5  --  5.8  --  7.3   RBC 2.81*  --  2.70*  --  2.98*   HGB 9.0*   < > 8.6* 8.8* 9.4*   HCT 30.8*  --  29.3*  --  32.2*   .6*  --  108.5*  --  108.1*   MCH 32.0  --  31.9  --  31.5   MCHC 29.2*  --  29.4*  --  29.2*   RDW 17.0*  --  16.8*  --  16.7*   *  --  135  --  139   MPV 13.3*  --  12.9*  --  12.9*    < > = values in this interval not displayed. Lab Results   Component Value Date    CKTOTAL 21 02/25/2022     No results for input(s): CKTOTAL, CKMB, CKMBINDEX, TROPHS in the last 72 hours. Lab Results   Component Value Date    INR 1.1 03/01/2022    INR 1.1 01/23/2022    INR 1.0 11/20/2021    PROTIME 12.4 03/01/2022    PROTIME 13.0 (H) 01/23/2022    PROTIME 11.5 11/20/2021     Lab Results   Component Value Date    TSH 0.376 11/17/2021    TSH 0.807 12/22/2019     Lab Results   Component Value Date    LABA1C 5.7 (H) 11/17/2021     No results found for: EAG  No results found for: CHOL  No results found for: TRIG  No results found for: HDL  No results found for: LDLCALC, LDLCHOLESTEROL  No results found for: LABVLDL, VLDL  No results found for: CHOLHDLRATIO  No results for input(s): PROBNP in the last 72 hours.     Cardiac Tests:  EKG reviewed (EKG date: EKG shows sinus rhythm, left bundle branch block, rate of 98 beats per):        Echocardiogram reviewed: January 2022  Mild concentric left ventricular hypertrophy. There is doppler evidence of stage I diastolic dysfunction. Ejection fraction is visually estimated at 60 to 65%. Normal right ventricular size and function. Mild tricuspid regurgitation. Signature    Stress test reviewed:      Cardiac catheterization reviewed:     CXR reviewed: The ASCVD Risk score (Simón Allen, et al., 2013) failed to calculate for the following reasons:    Cannot find a previous HDL lab    Cannot find a previous total cholesterol lab    ASSESSMENT / PLAN:    1. Acute respiratory failure with hypoxia (HCC)  Management per pulmonary and critical care  2. Colitis and anemia now being evaluated for EGD and cardiology called for preoperative cardiovascular evaluation  From cardiovascular perspective patient may proceed to the plan GI procedure without additional cardiac testing  Please check with pulmonology prior to the procedure given patient's pulmonary status  Call cardiology if there are concerns prior to, during or after the procedure  Cardiology will monitor closely and follow patient    3. Dehydration  Management per primary service  4. Elevated troponin  Likely demand ischemia in the setting of anemia  Proceed with GI evaluation  Follow-up with your cardiologist to discuss ischemic evaluation once GI issues is resolved. 5.  Paroxysmal atrial fibrillation  She is now back on Eliquis and his H&H will need to be monitored closely. Coreg is resumed at 25 mg twice a day as blood pressure is not controlled   Monitor closely   Consider watchman device evaluation in the outpatient especially if patient is unable to be on anticoagulation due to bleeding risk    6. DVT  Now back on anticoagulation    7. COPD    8. Sleep apnea    9.   Chronic kidney disease status post prior transfer  Nephrology following    10. Bacteremia/positive blood culture  CLEMENCIA on February 28, 2022 revealed no evidence of endocarditis    11. Hypertension  Continue beta-blocker and monitor blood pressure close          Thank you for allowing me to participate in your patient's care. Please feel free to contact me if you have any questions or concerns.     Magalie Coffey MD  [phone]  Cardiology

## 2022-03-04 LAB — BLOOD CULTURE, ROUTINE: NORMAL

## 2022-03-09 ENCOUNTER — APPOINTMENT (OUTPATIENT)
Dept: GENERAL RADIOLOGY | Age: 67
DRG: 177 | End: 2022-03-09
Payer: MEDICARE

## 2022-03-09 ENCOUNTER — HOSPITAL ENCOUNTER (INPATIENT)
Age: 67
LOS: 12 days | Discharge: NURSING, REHAB OR LONG-TERM CARE | DRG: 177 | End: 2022-03-23
Attending: EMERGENCY MEDICINE | Admitting: INTERNAL MEDICINE
Payer: MEDICARE

## 2022-03-09 DIAGNOSIS — Z01.89 ENCOUNTER FOR IMAGING STUDY TO CONFIRM NASOGASTRIC (NG) TUBE PLACEMENT: ICD-10-CM

## 2022-03-09 DIAGNOSIS — R41.0 CONFUSION: ICD-10-CM

## 2022-03-09 DIAGNOSIS — J96.01 ACUTE RESPIRATORY FAILURE WITH HYPOXIA (HCC): Primary | ICD-10-CM

## 2022-03-09 LAB
ALBUMIN SERPL-MCNC: 2.5 G/DL (ref 3.5–5.2)
ALP BLD-CCNC: 114 U/L (ref 35–104)
ALT SERPL-CCNC: 7 U/L (ref 0–32)
AMMONIA: 23 UMOL/L (ref 11–51)
ANION GAP SERPL CALCULATED.3IONS-SCNC: 15 MMOL/L (ref 7–16)
ANISOCYTOSIS: ABNORMAL
AST SERPL-CCNC: 13 U/L (ref 0–31)
B.E.: 0.9 MMOL/L (ref -3–3)
BACTERIA: ABNORMAL /HPF
BASOPHILS ABSOLUTE: 0 E9/L (ref 0–0.2)
BASOPHILS RELATIVE PERCENT: 0 % (ref 0–2)
BILIRUB SERPL-MCNC: 0.6 MG/DL (ref 0–1.2)
BILIRUBIN URINE: NEGATIVE
BLOOD, URINE: NEGATIVE
BUN BLDV-MCNC: 30 MG/DL (ref 6–23)
CALCIUM SERPL-MCNC: 10.1 MG/DL (ref 8.6–10.2)
CHLORIDE BLD-SCNC: 105 MMOL/L (ref 98–107)
CLARITY: CLEAR
CO2: 22 MMOL/L (ref 22–29)
COHB: 0.3 % (ref 0–1.5)
COLOR: YELLOW
CREAT SERPL-MCNC: 1.6 MG/DL (ref 0.5–1)
CRITICAL: ABNORMAL
DATE ANALYZED: ABNORMAL
DATE OF COLLECTION: ABNORMAL
EOSINOPHILS ABSOLUTE: 0 E9/L (ref 0.05–0.5)
EOSINOPHILS RELATIVE PERCENT: 0 % (ref 0–6)
EPITHELIAL CELLS, UA: ABNORMAL /HPF
GFR AFRICAN AMERICAN: 39
GFR NON-AFRICAN AMERICAN: 32 ML/MIN/1.73
GLUCOSE BLD-MCNC: 96 MG/DL (ref 74–99)
GLUCOSE URINE: NEGATIVE MG/DL
HCO3: 26.3 MMOL/L (ref 22–26)
HCT VFR BLD CALC: 37.4 % (ref 34–48)
HEMOGLOBIN: 10.8 G/DL (ref 11.5–15.5)
HERPES TYPE 1/2 IGM COMBINED: 0.89 IV
HERPES TYPE I/II IGG COMBINED: >22.4 IV
HHB: 3.7 % (ref 0–5)
HSV 1 GLYCOPROTEIN G AB IGG: 30.3 IV
HSV 2 GLYCOPROTEIN G AB IGG: 9.54 IV
HYPOCHROMIA: ABNORMAL
KETONES, URINE: NEGATIVE MG/DL
LAB: ABNORMAL
LACTIC ACID: 1 MMOL/L (ref 0.5–2.2)
LEUKOCYTE ESTERASE, URINE: NEGATIVE
LIPASE: 13 U/L (ref 13–60)
LYMPHOCYTES ABSOLUTE: 0.74 E9/L (ref 1.5–4)
LYMPHOCYTES RELATIVE PERCENT: 3 % (ref 20–42)
Lab: ABNORMAL
MCH RBC QN AUTO: 31.6 PG (ref 26–35)
MCHC RBC AUTO-ENTMCNC: 28.9 % (ref 32–34.5)
MCV RBC AUTO: 109.4 FL (ref 80–99.9)
METAMYELOCYTES RELATIVE PERCENT: 1 % (ref 0–1)
METHB: 0.5 % (ref 0–1.5)
MODE: ABNORMAL
MONOCYTES ABSOLUTE: 0.99 E9/L (ref 0.1–0.95)
MONOCYTES RELATIVE PERCENT: 4 % (ref 2–12)
NEUTROPHILS ABSOLUTE: 22.97 E9/L (ref 1.8–7.3)
NEUTROPHILS RELATIVE PERCENT: 92 % (ref 43–80)
NITRITE, URINE: NEGATIVE
O2 CONTENT: 13.6 ML/DL
O2 SATURATION: 96.3 % (ref 92–98.5)
O2HB: 95.5 % (ref 94–97)
OPERATOR ID: ABNORMAL
PATIENT TEMP: 37 C
PCO2: 45.5 MMHG (ref 35–45)
PDW BLD-RTO: 16.3 FL (ref 11.5–15)
PH BLOOD GAS: 7.38 (ref 7.35–7.45)
PH UA: 5 (ref 5–9)
PLATELET # BLD: 139 E9/L (ref 130–450)
PMV BLD AUTO: 13.3 FL (ref 7–12)
PO2: 92.9 MMHG (ref 75–100)
POLYCHROMASIA: ABNORMAL
POTASSIUM REFLEX MAGNESIUM: 4.7 MMOL/L (ref 3.5–5)
PRO-BNP: 1646 PG/ML (ref 0–125)
PROTEIN UA: ABNORMAL MG/DL
RBC # BLD: 3.42 E12/L (ref 3.5–5.5)
RBC UA: ABNORMAL /HPF (ref 0–2)
SODIUM BLD-SCNC: 142 MMOL/L (ref 132–146)
SOURCE, BLOOD GAS: ABNORMAL
SPECIFIC GRAVITY UA: 1.02 (ref 1–1.03)
THB: 10 G/DL (ref 11.5–16.5)
TIME ANALYZED: 1449
TOTAL PROTEIN: 5.6 G/DL (ref 6.4–8.3)
TROPONIN, HIGH SENSITIVITY: 178 NG/L (ref 0–9)
TROPONIN, HIGH SENSITIVITY: 204 NG/L (ref 0–9)
UROBILINOGEN, URINE: 0.2 E.U./DL
WBC # BLD: 24.7 E9/L (ref 4.5–11.5)
WBC UA: ABNORMAL /HPF (ref 0–5)

## 2022-03-09 PROCEDURE — 83690 ASSAY OF LIPASE: CPT

## 2022-03-09 PROCEDURE — 6370000000 HC RX 637 (ALT 250 FOR IP): Performed by: INTERNAL MEDICINE

## 2022-03-09 PROCEDURE — 87088 URINE BACTERIA CULTURE: CPT

## 2022-03-09 PROCEDURE — 36600 WITHDRAWAL OF ARTERIAL BLOOD: CPT

## 2022-03-09 PROCEDURE — 81001 URINALYSIS AUTO W/SCOPE: CPT

## 2022-03-09 PROCEDURE — 71045 X-RAY EXAM CHEST 1 VIEW: CPT

## 2022-03-09 PROCEDURE — 6360000002 HC RX W HCPCS: Performed by: INTERNAL MEDICINE

## 2022-03-09 PROCEDURE — 36415 COLL VENOUS BLD VENIPUNCTURE: CPT

## 2022-03-09 PROCEDURE — 85025 COMPLETE CBC W/AUTO DIFF WBC: CPT

## 2022-03-09 PROCEDURE — 87186 SC STD MICRODIL/AGAR DIL: CPT

## 2022-03-09 PROCEDURE — 93005 ELECTROCARDIOGRAM TRACING: CPT | Performed by: STUDENT IN AN ORGANIZED HEALTH CARE EDUCATION/TRAINING PROGRAM

## 2022-03-09 PROCEDURE — 80053 COMPREHEN METABOLIC PANEL: CPT

## 2022-03-09 PROCEDURE — G0378 HOSPITAL OBSERVATION PER HR: HCPCS

## 2022-03-09 PROCEDURE — 83605 ASSAY OF LACTIC ACID: CPT

## 2022-03-09 PROCEDURE — 83880 ASSAY OF NATRIURETIC PEPTIDE: CPT

## 2022-03-09 PROCEDURE — 99284 EMERGENCY DEPT VISIT MOD MDM: CPT

## 2022-03-09 PROCEDURE — 87040 BLOOD CULTURE FOR BACTERIA: CPT

## 2022-03-09 PROCEDURE — 87077 CULTURE AEROBIC IDENTIFY: CPT

## 2022-03-09 PROCEDURE — 82805 BLOOD GASES W/O2 SATURATION: CPT

## 2022-03-09 PROCEDURE — 82140 ASSAY OF AMMONIA: CPT

## 2022-03-09 PROCEDURE — 84484 ASSAY OF TROPONIN QUANT: CPT

## 2022-03-09 RX ORDER — DOCUSATE SODIUM 100 MG/1
100 CAPSULE, LIQUID FILLED ORAL 2 TIMES DAILY
Status: DISCONTINUED | OUTPATIENT
Start: 2022-03-09 | End: 2022-03-14 | Stop reason: CLARIF

## 2022-03-09 RX ORDER — METOPROLOL SUCCINATE 50 MG/1
50 TABLET, EXTENDED RELEASE ORAL DAILY
Status: DISCONTINUED | OUTPATIENT
Start: 2022-03-10 | End: 2022-03-11

## 2022-03-09 RX ORDER — LEVOTHYROXINE SODIUM 0.05 MG/1
50 TABLET ORAL DAILY
Status: DISCONTINUED | OUTPATIENT
Start: 2022-03-10 | End: 2022-03-23 | Stop reason: HOSPADM

## 2022-03-09 RX ORDER — FOLIC ACID 1 MG/1
1 TABLET ORAL DAILY
Status: DISCONTINUED | OUTPATIENT
Start: 2022-03-10 | End: 2022-03-23 | Stop reason: HOSPADM

## 2022-03-09 RX ORDER — ALLOPURINOL 100 MG/1
100 TABLET ORAL DAILY
Status: DISCONTINUED | OUTPATIENT
Start: 2022-03-10 | End: 2022-03-23 | Stop reason: HOSPADM

## 2022-03-09 RX ORDER — ATORVASTATIN CALCIUM 10 MG/1
10 TABLET, FILM COATED ORAL NIGHTLY
Status: DISCONTINUED | OUTPATIENT
Start: 2022-03-09 | End: 2022-03-23 | Stop reason: HOSPADM

## 2022-03-09 RX ORDER — DESVENLAFAXINE 50 MG/1
50 TABLET, EXTENDED RELEASE ORAL DAILY
Status: DISCONTINUED | OUTPATIENT
Start: 2022-03-10 | End: 2022-03-16

## 2022-03-09 RX ORDER — MYCOPHENOLATE MOFETIL 250 MG/1
500 CAPSULE ORAL 2 TIMES DAILY
Status: DISCONTINUED | OUTPATIENT
Start: 2022-03-09 | End: 2022-03-16

## 2022-03-09 RX ORDER — PREDNISONE 1 MG/1
5 TABLET ORAL DAILY
Status: DISCONTINUED | OUTPATIENT
Start: 2022-03-10 | End: 2022-03-23 | Stop reason: HOSPADM

## 2022-03-09 RX ORDER — CYCLOSPORINE 25 MG/1
100 CAPSULE, GELATIN COATED ORAL 2 TIMES DAILY
Status: DISCONTINUED | OUTPATIENT
Start: 2022-03-09 | End: 2022-03-17 | Stop reason: CLARIF

## 2022-03-09 RX ORDER — ONDANSETRON 4 MG/1
4 TABLET, ORALLY DISINTEGRATING ORAL EVERY 8 HOURS PRN
Status: DISCONTINUED | OUTPATIENT
Start: 2022-03-09 | End: 2022-03-23 | Stop reason: HOSPADM

## 2022-03-09 RX ORDER — PANTOPRAZOLE SODIUM 40 MG/1
40 TABLET, DELAYED RELEASE ORAL
Status: DISCONTINUED | OUTPATIENT
Start: 2022-03-10 | End: 2022-03-23 | Stop reason: HOSPADM

## 2022-03-09 RX ORDER — LISINOPRIL 5 MG/1
5 TABLET ORAL DAILY
Status: DISCONTINUED | OUTPATIENT
Start: 2022-03-10 | End: 2022-03-14

## 2022-03-09 RX ORDER — CLOZAPINE 25 MG/1
50 TABLET ORAL NIGHTLY
Status: DISCONTINUED | OUTPATIENT
Start: 2022-03-09 | End: 2022-03-23 | Stop reason: HOSPADM

## 2022-03-09 RX ADMIN — CYCLOSPORINE 100 MG: 25 CAPSULE, GELATIN COATED ORAL at 22:31

## 2022-03-09 RX ADMIN — MYCOPHENOLATE MOFETIL 500 MG: 250 CAPSULE ORAL at 22:33

## 2022-03-09 RX ADMIN — DOCUSATE SODIUM 100 MG: 100 CAPSULE ORAL at 22:33

## 2022-03-09 RX ADMIN — CLOZAPINE 50 MG: 25 TABLET ORAL at 22:32

## 2022-03-09 RX ADMIN — ATORVASTATIN CALCIUM 10 MG: 10 TABLET, FILM COATED ORAL at 22:32

## 2022-03-09 RX ADMIN — APIXABAN 5 MG: 5 TABLET, FILM COATED ORAL at 22:33

## 2022-03-09 ASSESSMENT — ENCOUNTER SYMPTOMS
TROUBLE SWALLOWING: 0
VOICE CHANGE: 0
DIARRHEA: 0
PHOTOPHOBIA: 0
NAUSEA: 0
VOMITING: 0
COUGH: 0
ABDOMINAL PAIN: 0
SINUS PAIN: 0
SHORTNESS OF BREATH: 0

## 2022-03-09 NOTE — ED NOTES
Due to apparent delirium, patient repeatedly removed nasal cannula oxygen, replaced and situation is being monitored, patient instructed but not sure of insight at present.       Nita Fontanez RN  03/09/22 [id number]

## 2022-03-09 NOTE — ED NOTES
Patient is nonambulatory and is hypoxic on room air without exertion, patient therefore not ambulated to check pulse ox, physician notified.       Moody Hood RN  03/09/22 0767

## 2022-03-09 NOTE — CARE COORDINATION
SS Note: No Covid test. Pt was Covid neg on 3/2. Pt presented from [facility] for [facility]. D/T apparent delirium, patient repeatedly removed nasal cannula oxygen. Pt identified as potential readmission. Last hospitalization 2/25 - 3/2/ for Acute respiratory failure with hypoxia. Pt also had DVT & Positive blood culture--showing gram-positive cocci in clusters, Staphylococcus with methicillin resistance by PCR.      SW met w/pt in ED 02 for transition of care planning. Spoke from door wearing mask/PPE and explained role. Pt is not A&O to place or situation. She was able to read clock and knew day of week. However, she also thought she was going to drive home. She did not know she was coming from a [facility]. Chart review notes pt is  & prior to hospitalization in Nov 2021 she was living alone in a 9th floor apartment, elevator to enter. Since then, she has been quite ill and has had 3 hospitalizations and a transfer to [facility] at end of January d/t hx kidney transplant 2015. Pt has had sepsis and has been in coma- non-responsive for 10 days. She also has been in several [facility] and has also been at [facility]. She really has not been home for much more than 10 days since November 2021. Pt's son, Amy Dewitt [phone] listed as primary decision maker. No POA as pt has not been able to make this decision. PCP was [facility] but now is listed as [address] is [facility] in [facility]. Pt has following DME: [facility]. Past hx of [facility] w[facility]. Pt has requried 02. SW called [facility]. She noted pt's PCP seems to be Lorraine Lucero and no appointments are listed with Radha. Pt is a bed hold if admitted. No precert to return but will need updated PT/OT. SW was able to speak to pt's son. He notes pt's PCP is   Radha. Only reason she has Dr. Rhys Bertrnad listed is because he is the physician over [facility].      No alternatives to admission at this time. Pt can cannot be safely treated at a lower level of service and readmission required. [id number]  SW to pt's room and pt's son present. SW completed readmission review. He noted they prefer pt to go to another [facility] @ d/c. Choices are:  1) [facility]  2) [facility]  3) [facility] [place]. He noted [facility] and she liked it. He stated if none of these have beds, to please call him-[facility] [phone].   Electronically signed by AG Dias on 3/9/2022 at 6:21 PM

## 2022-03-10 ENCOUNTER — APPOINTMENT (OUTPATIENT)
Dept: GENERAL RADIOLOGY | Age: 67
DRG: 177 | End: 2022-03-10
Payer: MEDICARE

## 2022-03-10 ENCOUNTER — APPOINTMENT (OUTPATIENT)
Dept: CT IMAGING | Age: 67
DRG: 177 | End: 2022-03-10
Payer: MEDICARE

## 2022-03-10 LAB
ACETAMINOPHEN LEVEL: <5 MCG/ML (ref 10–30)
ALBUMIN SERPL-MCNC: 2 G/DL (ref 3.5–5.2)
ALP BLD-CCNC: 101 U/L (ref 35–104)
ALT SERPL-CCNC: 9 U/L (ref 0–32)
AMMONIA: 24 UMOL/L (ref 11–51)
AMPHETAMINE SCREEN, URINE: NOT DETECTED
ANION GAP SERPL CALCULATED.3IONS-SCNC: 14 MMOL/L (ref 7–16)
ANISOCYTOSIS: ABNORMAL
AST SERPL-CCNC: 18 U/L (ref 0–31)
BARBITURATE SCREEN URINE: NOT DETECTED
BASOPHILS ABSOLUTE: 0 E9/L (ref 0–0.2)
BASOPHILS RELATIVE PERCENT: 0.2 % (ref 0–2)
BENZODIAZEPINE SCREEN, URINE: NOT DETECTED
BILIRUB SERPL-MCNC: 0.6 MG/DL (ref 0–1.2)
BUN BLDV-MCNC: 38 MG/DL (ref 6–23)
CALCIUM SERPL-MCNC: 9.9 MG/DL (ref 8.6–10.2)
CANNABINOID SCREEN URINE: NOT DETECTED
CHLORIDE BLD-SCNC: 108 MMOL/L (ref 98–107)
CO2: 21 MMOL/L (ref 22–29)
COCAINE METABOLITE SCREEN URINE: NOT DETECTED
CREAT SERPL-MCNC: 1.7 MG/DL (ref 0.5–1)
EKG ATRIAL RATE: 94 BPM
EKG P AXIS: 23 DEGREES
EKG P-R INTERVAL: 148 MS
EKG Q-T INTERVAL: 380 MS
EKG QRS DURATION: 148 MS
EKG QTC CALCULATION (BAZETT): 475 MS
EKG R AXIS: -45 DEGREES
EKG T AXIS: 111 DEGREES
EKG VENTRICULAR RATE: 94 BPM
EOSINOPHILS ABSOLUTE: 0 E9/L (ref 0.05–0.5)
EOSINOPHILS RELATIVE PERCENT: 0.1 % (ref 0–6)
ETHANOL: <10 MG/DL (ref 0–0.08)
FENTANYL SCREEN, URINE: NOT DETECTED
GFR AFRICAN AMERICAN: 36
GFR NON-AFRICAN AMERICAN: 30 ML/MIN/1.73
GLUCOSE BLD-MCNC: 75 MG/DL (ref 74–99)
HCT VFR BLD CALC: 28.7 % (ref 34–48)
HEMOGLOBIN: 8.6 G/DL (ref 11.5–15.5)
LYMPHOCYTES ABSOLUTE: 0.8 E9/L (ref 1.5–4)
LYMPHOCYTES RELATIVE PERCENT: 3.5 % (ref 20–42)
Lab: NORMAL
MCH RBC QN AUTO: 32.2 PG (ref 26–35)
MCHC RBC AUTO-ENTMCNC: 30 % (ref 32–34.5)
MCV RBC AUTO: 107.5 FL (ref 80–99.9)
METER GLUCOSE: 104 MG/DL (ref 74–99)
METHADONE SCREEN, URINE: NOT DETECTED
MONOCYTES ABSOLUTE: 0.6 E9/L (ref 0.1–0.95)
MONOCYTES RELATIVE PERCENT: 2.6 % (ref 2–12)
NEUTROPHILS ABSOLUTE: 18.8 E9/L (ref 1.8–7.3)
NEUTROPHILS RELATIVE PERCENT: 93.9 % (ref 43–80)
OPIATE SCREEN URINE: NOT DETECTED
OXYCODONE URINE: NOT DETECTED
PDW BLD-RTO: 16.1 FL (ref 11.5–15)
PHENCYCLIDINE SCREEN URINE: NOT DETECTED
PLATELET # BLD: 145 E9/L (ref 130–450)
PMV BLD AUTO: 13.3 FL (ref 7–12)
POLYCHROMASIA: ABNORMAL
POTASSIUM SERPL-SCNC: 4.9 MMOL/L (ref 3.5–5)
PROCALCITONIN: 0.33 NG/ML (ref 0–0.08)
RBC # BLD: 2.67 E12/L (ref 3.5–5.5)
SALICYLATE, SERUM: <0.3 MG/DL (ref 0–30)
SODIUM BLD-SCNC: 143 MMOL/L (ref 132–146)
T4 FREE: 1.04 NG/DL (ref 0.93–1.7)
TOTAL PROTEIN: 4.9 G/DL (ref 6.4–8.3)
TRICYCLIC ANTIDEPRESSANTS SCREEN SERUM: NEGATIVE NG/ML
TSH SERPL DL<=0.05 MIU/L-ACNC: 2.27 UIU/ML (ref 0.27–4.2)
WBC # BLD: 20 E9/L (ref 4.5–11.5)

## 2022-03-10 PROCEDURE — 6360000002 HC RX W HCPCS: Performed by: SPECIALIST

## 2022-03-10 PROCEDURE — 82077 ASSAY SPEC XCP UR&BREATH IA: CPT

## 2022-03-10 PROCEDURE — 97165 OT EVAL LOW COMPLEX 30 MIN: CPT

## 2022-03-10 PROCEDURE — 96366 THER/PROPH/DIAG IV INF ADDON: CPT

## 2022-03-10 PROCEDURE — 99222 1ST HOSP IP/OBS MODERATE 55: CPT | Performed by: INTERNAL MEDICINE

## 2022-03-10 PROCEDURE — 80143 DRUG ASSAY ACETAMINOPHEN: CPT

## 2022-03-10 PROCEDURE — 87324 CLOSTRIDIUM AG IA: CPT

## 2022-03-10 PROCEDURE — 82140 ASSAY OF AMMONIA: CPT

## 2022-03-10 PROCEDURE — 73030 X-RAY EXAM OF SHOULDER: CPT

## 2022-03-10 PROCEDURE — G0378 HOSPITAL OBSERVATION PER HR: HCPCS

## 2022-03-10 PROCEDURE — 82962 GLUCOSE BLOOD TEST: CPT

## 2022-03-10 PROCEDURE — 86696 HERPES SIMPLEX TYPE 2 TEST: CPT

## 2022-03-10 PROCEDURE — 2580000003 HC RX 258: Performed by: SPECIALIST

## 2022-03-10 PROCEDURE — 73060 X-RAY EXAM OF HUMERUS: CPT

## 2022-03-10 PROCEDURE — 36415 COLL VENOUS BLD VENIPUNCTURE: CPT

## 2022-03-10 PROCEDURE — 84443 ASSAY THYROID STIM HORMONE: CPT

## 2022-03-10 PROCEDURE — 96367 TX/PROPH/DG ADDL SEQ IV INF: CPT

## 2022-03-10 PROCEDURE — 6370000000 HC RX 637 (ALT 250 FOR IP): Performed by: INTERNAL MEDICINE

## 2022-03-10 PROCEDURE — 80307 DRUG TEST PRSMV CHEM ANLYZR: CPT

## 2022-03-10 PROCEDURE — 97161 PT EVAL LOW COMPLEX 20 MIN: CPT | Performed by: PHYSICAL THERAPIST

## 2022-03-10 PROCEDURE — APPSS60 APP SPLIT SHARED TIME 46-60 MINUTES: Performed by: PHYSICIAN ASSISTANT

## 2022-03-10 PROCEDURE — 86694 HERPES SIMPLEX NES ANTBDY: CPT

## 2022-03-10 PROCEDURE — 84439 ASSAY OF FREE THYROXINE: CPT

## 2022-03-10 PROCEDURE — 84145 PROCALCITONIN (PCT): CPT

## 2022-03-10 PROCEDURE — 86695 HERPES SIMPLEX TYPE 1 TEST: CPT

## 2022-03-10 PROCEDURE — 70450 CT HEAD/BRAIN W/O DYE: CPT

## 2022-03-10 PROCEDURE — 2580000003 HC RX 258: Performed by: INTERNAL MEDICINE

## 2022-03-10 PROCEDURE — 85025 COMPLETE CBC W/AUTO DIFF WBC: CPT

## 2022-03-10 PROCEDURE — 96361 HYDRATE IV INFUSION ADD-ON: CPT

## 2022-03-10 PROCEDURE — 6360000002 HC RX W HCPCS: Performed by: INTERNAL MEDICINE

## 2022-03-10 PROCEDURE — 87449 NOS EACH ORGANISM AG IA: CPT

## 2022-03-10 PROCEDURE — 96365 THER/PROPH/DIAG IV INF INIT: CPT

## 2022-03-10 PROCEDURE — 80179 DRUG ASSAY SALICYLATE: CPT

## 2022-03-10 PROCEDURE — 80053 COMPREHEN METABOLIC PANEL: CPT

## 2022-03-10 RX ORDER — SODIUM CHLORIDE 9 MG/ML
INJECTION, SOLUTION INTRAVENOUS CONTINUOUS
Status: DISCONTINUED | OUTPATIENT
Start: 2022-03-10 | End: 2022-03-13

## 2022-03-10 RX ORDER — 0.9 % SODIUM CHLORIDE 0.9 %
500 INTRAVENOUS SOLUTION INTRAVENOUS ONCE
Status: COMPLETED | OUTPATIENT
Start: 2022-03-10 | End: 2022-03-10

## 2022-03-10 RX ADMIN — LEVOTHYROXINE SODIUM 50 MCG: 0.05 TABLET ORAL at 05:50

## 2022-03-10 RX ADMIN — ACYCLOVIR SODIUM 350 MG: 50 INJECTION, SOLUTION INTRAVENOUS at 14:48

## 2022-03-10 RX ADMIN — VANCOMYCIN HYDROCHLORIDE 1000 MG: 1 INJECTION, POWDER, LYOPHILIZED, FOR SOLUTION INTRAVENOUS at 16:06

## 2022-03-10 RX ADMIN — PIPERACILLIN AND TAZOBACTAM 3375 MG: 3; .375 INJECTION, POWDER, FOR SOLUTION INTRAVENOUS at 18:19

## 2022-03-10 RX ADMIN — DOCUSATE SODIUM 100 MG: 100 CAPSULE ORAL at 08:31

## 2022-03-10 RX ADMIN — SODIUM CHLORIDE: 9 INJECTION, SOLUTION INTRAVENOUS at 22:45

## 2022-03-10 RX ADMIN — APIXABAN 5 MG: 5 TABLET, FILM COATED ORAL at 08:32

## 2022-03-10 RX ADMIN — ACYCLOVIR SODIUM 350 MG: 50 INJECTION, SOLUTION INTRAVENOUS at 22:43

## 2022-03-10 RX ADMIN — MYCOPHENOLATE MOFETIL 500 MG: 250 CAPSULE ORAL at 08:32

## 2022-03-10 RX ADMIN — CYCLOSPORINE 100 MG: 25 CAPSULE, GELATIN COATED ORAL at 08:32

## 2022-03-10 RX ADMIN — PREDNISONE 5 MG: 5 TABLET ORAL at 08:33

## 2022-03-10 RX ADMIN — SODIUM CHLORIDE: 9 INJECTION, SOLUTION INTRAVENOUS at 18:17

## 2022-03-10 RX ADMIN — METOPROLOL SUCCINATE 50 MG: 50 TABLET, EXTENDED RELEASE ORAL at 08:32

## 2022-03-10 RX ADMIN — DESVENLAFAXINE 50 MG: 50 TABLET, EXTENDED RELEASE ORAL at 08:33

## 2022-03-10 RX ADMIN — PANTOPRAZOLE SODIUM 40 MG: 40 TABLET, DELAYED RELEASE ORAL at 05:50

## 2022-03-10 RX ADMIN — SODIUM CHLORIDE 500 ML: 9 INJECTION, SOLUTION INTRAVENOUS at 17:06

## 2022-03-10 RX ADMIN — ALLOPURINOL 100 MG: 100 TABLET ORAL at 08:32

## 2022-03-10 RX ADMIN — LISINOPRIL 5 MG: 5 TABLET ORAL at 08:32

## 2022-03-10 RX ADMIN — PIPERACILLIN AND TAZOBACTAM 3375 MG: 3; .375 INJECTION, POWDER, FOR SOLUTION INTRAVENOUS at 10:57

## 2022-03-10 RX ADMIN — FOLIC ACID 1 MG: 1 TABLET ORAL at 08:31

## 2022-03-10 ASSESSMENT — PAIN SCALES - GENERAL
PAINLEVEL_OUTOF10: 0
PAINLEVEL_OUTOF10: 0

## 2022-03-10 NOTE — PROGRESS NOTES
[address] [facility] CTR  [facility] [place]. [place]        Date:3/10/2022                                                  Patient Name: Osmel Escobar    MRN: [id number]    : 1955    Room: [address] [place] [address]      Evaluating OT: Lexx Lou OTR/L; [id number]     Referring Provider and Specific Provider Orders/Date:      22  OT eval and treat  Start:  22,   End:  22,   ONE TIME,   Standing Count:  1 Occurrences,   Valentin Monsalve MD     Placement Recommendation: Subacute         Diagnosis:   1. Acute respiratory failure with hypoxia ([address]    2.  Confusion         Surgery: none       Pertinent Medical History:       Past Medical History:   Diagnosis Date    Abnormal EKG     left bundle branch block    Acute gout involving toe of right foot 2020    Acute on chronic combined systolic (congestive) and diastolic (congestive) heart failure (HCC)     Cancer (HCC)     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    Cerebrovascular disease     Clotting disorder ([address] 1985    thrombophlebitis after c section delivery    Depression     15 years followed by dr Kimmy Herrera Hemodialysis patient [facility])     History of blood transfusion     Hyperlipidemia     Hypertension     Hypothyroidism     Leg swelling 2020    Lymphedema of both lower extremities 2020    Peripheral vascular disease ([address]     Renal failure 2012    renal transplant    Thrombophlebitis     after c section delivery    Thyroid disease          Past Surgical History:   Procedure Laterality Date     SECTION  1985    one normal delivery    COLECTOMY N/A 1/15/2022    DIAGNOSTIC  LAPAROSCOPY LYSIS OF ADHESIONS performed by Jackie Lyons MD at [address] [place] [address] Drive CATH LAB PROCEDURE      normal coronaries and  normal coronaries    DIALYSIS FISTULA CREATION  march and july 2012    phase one and two patient will start dialysis when needed   [address]    transfemoral venous bypass grafts    IR IVC FILTER PLACEMENT W IMAGING  2/26/2022    IR IVC FILTER PLACEMENT W IMAGING 2/26/2022 [facility] SPECIAL PROCEDURES    KIDNEY TRANSPLANT  2016    KIDNEY TRANSPLANT  2015    KIDNEY TRANSPLANT  2015    PARATHYROIDECTOMY  2006    left parathyroid  implant and parathyroidectomy    TRANSESOPHAGEAL ECHOCARDIOGRAM N/A 2/28/2022    TRANSESOPHAGEAL ECHOCARDIOGRAM WITH BUBBLE STUDY performed by Marco Gomes MD at [facility] ENDOSCOPY     Precautions:  Fall Risk, L UE edema, non-ambulatory     Assessment of current deficits:     [x] Functional mobility  [x]ADLs  [x] Strength               []Cognition    [x] Functional transfers   [x] IADLs         [] Safety Awareness   [x]Endurance    [x] Fine Coordination              [x] Balance      [] Vision/perception   []Sensation     [x]Gross Motor Coordination  [x] ROM  [] Delirium                   [x] Motor Control     OT PLAN OF CARE   OT POC based on physician orders, patient diagnosis and results of clinical assessment    Frequency/Duration 1-3 days/wk for 2 weeks PRN     Specific OT Treatment Interventions to include:   * Instruction/training on adapted ADL techniques and AE recommendations to increase functional independence within precautions       * Training on energy conservation strategies, correct breathing pattern and techniques to improve independence/tolerance for self-care routine  * Functional transfer/mobility training/DME recommendations for increased independence, safety, and fall prevention  * Patient/Family education to increase follow through with safety techniques and functional independence  * Recommendation of environmental modifications for increased safety with functional transfers/mobility and ADLs  * Splinting/positioning for increased function, prevention of contractures, and improve skin integrity  * Therapeutic exercise to improve motor endurance, ROM, and functional strength for ADLs/functional transfers  * Therapeutic activities to facilitate/challenge dynamic balance, stand tolerance for increased safety and independence with ADLs  * Positioning to improve skin integrity, interaction with environment and functional independence    Recommended Adaptive Equipment: none      Home Living: pt came to us from [facility] and [employer] owned: SNF equipment     Prior Level of Function: Dependnet with ADLs , Dependent with IADLs; non-ambulatory    Driving: no  Occupation: not working    Pain Level: pain in L UE, pt could not rate level of pain; Nursing notified.       Cognition: A&O: 2/4; Follows 1 step directions   Memory: fair    Sequencing: fair    Problem solving: fair    Judgement/safety: fair     [facility]   AM-PAC Daily Activity Inpatient   How much help for putting on and taking off regular lower body clothing?: Total  How much help for Bathing?: A Lot  How much help for Toileting?: Total  How much help for putting on and taking off regular upper body clothing?: A Lot  How much help for taking care of personal grooming?: A Lot  How much help for eating meals?: A Lot  AM-[facility] Inpatient Daily Activity Raw Score: 10  AM-PAC Inpatient ADL T-Scale Score : 27.31  ADL Inpatient CMS 0-100% Score: 74.7  ADL Inpatient CMS G-Code Modifier : CL     Functional Assessment:    Initial Eval Status  Date: 3/10/22 Treatment Status  Date: STGs = LTGs  Time frame: 10-14 days   Feeding Maximal Assist    Minimal Assist    Grooming Dependent to brush hair and work ofn removing large knot from back of hair   Minimal Assist    UB Dressing Maximal assist   Minimal Assist    LB Dressing Dependent   N.A     Bathing Maximal Assist  Moderate Assist   Toileting Dependent for hygiene as pt was incontinent of bowel   Moderate Assist    Bed Mobility  Supine to sit: Dependent x 2    Sit to supine: Dependent x 2    Supine to sit: Minimal Assist x 2    Sit to supine: Minimal Assist x 2     Balance Sitting:     Static: poor at EOB with maximal assist progressing to minimal assist     Dynamic: poor   Standing: N/T      Activity Tolerance Poor   fair    Visual/  Perceptual Glasses: no                 Hand Dominance: right      AROM (PROM) Strength Additional Info:    RUE  WFL 3/5 Poor  and wfl FMC/dexterity noted during ADL tasks     LUE Limited in all planes  2-/5 grossly poor  and wfl FMC/dexterity noted during ADL tasks       Hearing: [facility]   Sensation:  No c/o numbness or tingling  Tone: WFL   Edema: yes, L UE    Comments: Upon arrival the patient was supine. At end of session, patient was returned to supine and positioning with wedge cushions and pillows for skin integrity with call light and phone within reach, all lines and tubes intact. Overall patient demonstrated decreased independence and safety during completion of ADL/functional transfer/mobility tasks. Pt would benefit from continued skilled OT to increase safety and independence with completion of ADL/IADL tasks for functional independence and quality of life. Treatment: OT treatment provided this date includes:    Instruction/training on safe transfer techniques    Instruction/training on energy conservation/work simplification for completion of ADLs    Proper Positioning/Alignment    Rehab Potential: Good for established goals. Patient / Family Goal: no goal stated, pt thanked therapy for repositioning and sitting her up      Patient and/or family were instructed on functional diagnosis, prognosis/goals and OT plan of care. Demonstrated good understanding.      Eval Complexity: Low    Time In: 9:00am  Time Out: 9:20am    Total Treatment Time: 0       Min Units   OT Eval Low 97165  X  1    OT Eval Medium 34406      OT Eval High [id number]      OT Re-Eval [id number]            ADL/Self Care 10074     Therapeutic Activities 30625       Therapeutic Ex 85217 Orthotic Management [id number]       Manual [id number]     Neuro Re-Ed [id number]       Non-Billable Time        Evaluation Time additionally includes thorough review of current medical information, gathering information on past medical history/social history and prior level of function, interpretation of standardized testing/informal observation of tasks, assessment of data and development of plan of care and goals.         Evaluating OT: Keith Chand OTR/L; [id number]

## 2022-03-10 NOTE — PROCEDURES
Patient presented for CLEMENCIA for evaluation of endocarditis. The CLEMENCIA was performed with anesthesiology support. There was no complications. Please see epic for CLEMENCIA report and results.

## 2022-03-10 NOTE — CONSULTS
Formerly [facility]  Consult Note    [address] [facility] [address]  [place], [address]  Phone [phone]   [phone]      Admit Date: 3/9/2022 10:02 AM  Pt Name: Serena Washington  MRN: [id number]  : 1955  Reason for Consult:    Chief Complaint   Patient presents with    Altered Mental Status     from NH     Requesting Physician:  Ema Hooper MD  PCP: Arjun Franks MD  History Obtained From:  patient, chart   ID consulted for Confusion [R41.0]  Acute respiratory failure with hypoxia ([address] [J96.01]  on hospital day 0  CHIEF COMPLAINT       Chief Complaint   Patient presents with    Altered Mental Status     from NH     HISTORYOF PRESENT ILLNESS   Serena Washington is a 73 y.o. female who presents with   has a past medical history of Abnormal EKG, Acute gout involving toe of right foot, Acute on chronic combined systolic (congestive) and diastolic (congestive) heart failure ([address] Cancer ([address] Cerebrovascular disease, Clotting disorder ([address] Depression, Hemodialysis patient ([address] History of blood transfusion, Hyperlipidemia, Hypertension, Hypothyroidism, Leg swelling, Lymphedema of both lower extremities, Peripheral vascular disease ([address] Renal failure, Thrombophlebitis, and Thyroid disease. ED TRIAGEVITALS  BP: (!) 97/55, Temp: 97.7 °F (36.5 °C), Pulse: 96, Resp: 14, SpO2: 100 %  HPI  Know to ID   · Last seen on 3/2/2022-d/c on Augmentin cover poss aspiration and colitis  Pt came in due to altered mental status. Wbc24.7 hgb10.8 qui171 cr1.6 TMAX99.3  PT WAS SEEN AND EXAMINED WITH NURSES PRESENT  PT IS NOT AROUSABLE UNABLE TO OBTAIN HISTORY    PT HAS SOFT STOOLS SOME ODOR  SACRAL AREA VIEWED HAS SMALL DTI AREA  CXRY 1. Right perihilar discoid atelectasis. 2. There are no findings of failure or pneumonia.    LEFT SHOULDER No fracture.  Increased separation of the humeral head from the osseous   glenoid and AC joint, which could represent subluxation.  Otherwise, a large   synovial effusion, hematoma, or mass cannot be excluded.  Clinical   correlation is recommended.  CT scan of the left shoulder can be performed   for further evaluation.  Cardiomegaly. LEFT HUMERUS No fracture or dislocation.  Increased separation of the humeral head from   the osseous glenoid and AC joint, which could represent subluxation.  A large   synovial effusion, hematoma, or mass cannot be excluded.  CT scan of the left   shoulder can be considered for further evaluation. piperacillin-tazobactam (ZOSYN) 3,375 mg in dextrose 5 % 50 mL IVPB extended infusion (mini-bag), Q8H  vancomycin (VANCOCIN) oral solution 125 mg, 4 times per day  cycloSPORINE (SANDIMMUNE) capsule 100 mg, BID  mycophenolate (CELLCEPT) capsule 500 mg, BID  metoprolol succinate (TOPROL XL) extended release tablet 50 mg, Daily  predniSONE (DELTASONE) tablet 5 mg, Daily     LAST BLOOD CX 2/25 cons  2/28 CLEMENCIA  Summary   Technically difficult study. No definate evidence of vegetation consistent with endocarditis. Normal LV segmental wall motion. Ejection fraction is visually estimated at 60 to 65%. Normal right ventricular size and function. REVIEW OF SYSTEMS     CONSTITUTIONAL:   AS IN HPI     Medications Prior to Admission: pantoprazole (PROTONIX) 40 MG tablet, Take 1 tablet by mouth every morning (before breakfast)  amoxicillin-clavulanate (AUGMENTIN) 875-125 MG per tablet, Take 1 tablet by mouth every 12 hours for 10 days  apixaban (ELIQUIS) 5 MG TABS tablet, Take 5 mg by mouth 2 times daily Take in the morning and at bedtime for 3 months (started 2/19/2022)  atorvastatin (LIPITOR) 10 MG tablet, Take 10 mg by mouth daily  lisinopril (PRINIVIL;ZESTRIL) 5 MG tablet, take 1 tablet by mouth once daily  pregabalin (LYRICA) 75 MG capsule,   nystatin (MYCOSTATIN) 512959 UNIT/GM powder, Apply 3 times daily.   ipratropium-albuterol (DUONEB) 0.5-2.5 (3) MG/3ML SOLN nebulizer solution, Inhale 3 mLs into the lungs every 6 hours as needed for Shortness of Breath  ondansetron (ZOFRAN-ODT) 4 MG disintegrating tablet, Take 1 tablet by mouth every 8 hours as needed for Nausea or Vomiting  allopurinol (ZYLOPRIM) 100 MG tablet, Take 100 mg by mouth daily  Cyanocobalamin (B-12 COMPLIANCE INJECTION) 1000 MCG/ML KIT, Inject as directed monthly  metoprolol succinate (TOPROL XL) 50 MG extended release tablet, take 1 tablet by mouth once daily  desvenlafaxine succinate (PRISTIQ) 50 MG TB24 extended release tablet, Take 1 tablet by mouth every evening  cloZAPine (CLOZARIL) 50 MG tablet, Take 50 mg by mouth nightly  cycloSPORINE (SANDIMMUNE) 100 MG capsule, Take 100 mg by mouth 2 times daily  predniSONE (DELTASONE) 5 MG tablet, Take 5 mg by mouth daily  folic acid (FOLVITE) 1 MG tablet, Take 1 mg by mouth daily  docusate sodium (COLACE) 100 MG capsule, Take 100 mg by mouth 2 times daily  mycophenolate (CELLCEPT) 500 MG tablet, Take 1,000 mg by mouth 2 times daily  levothyroxine (SYNTHROID) 50 MCG tablet, Take 50 mcg by mouth daily.     budesonide (PULMICORT) 0.5 MG/2ML nebulizer suspension, Take 2 mLs by nebulization 2 times daily for 10 days  CURRENT MEDICATIONS     Current Facility-Administered Medications:     piperacillin-tazobactam (ZOSYN) 3,375 mg in dextrose 5 % 50 mL IVPB extended infusion (mini-bag), 3,375 mg, IntraVENous, Q8H, Susannah Ramirez MD    apixaban (ELIQUIS) tablet 5 mg, 5 mg, Oral, BID, Susannah Ramirez MD, 5 mg at 03/10/22 1401    desvenlafaxine succinate (PRISTIQ) extended release tablet 50 mg, 50 mg, Oral, Daily, Susannah Ramirez MD, 50 mg at 03/10/22 2474    ondansetron (ZOFRAN-ODT) disintegrating tablet 4 mg, 4 mg, Oral, Q8H PRN, Jossue Ramirez MD    atorvastatin (LIPITOR) tablet 10 mg, 10 mg, Oral, Nightly, Susannah Ramirez MD, 10 mg at 03/09/22 2232    cloZAPine (CLOZARIL) tablet 50 mg, 50 mg, Oral, Nightly, Susannah Ramirez MD, 50 mg at 03/09/22 2232    lisinopril (PRINIVIL;ZESTRIL) tablet 5 mg, 5 mg, Oral, Daily, Nicole Neil MD, 5 mg at 03/10/22 5692    levothyroxine (SYNTHROID) tablet 50 mcg, 50 mcg, Oral, Daily, Asha Ramirez MD, 50 mcg at 03/10/22 0550    pantoprazole (PROTONIX) tablet 40 mg, 40 mg, Oral, QAM AC, Susannah Ramirez MD, 40 mg at 03/10/22 0550    docusate sodium (COLACE) capsule 100 mg, 100 mg, Oral, BID, Susannah Ramirez MD, 100 mg at 03/10/22 0831    cycloSPORINE (SANDIMMUNE) capsule 100 mg, 100 mg, Oral, BID, Asha Ramirez MD, 100 mg at 03/10/22 8316    mycophenolate (CELLCEPT) capsule 500 mg, 500 mg, Oral, BID, Asha Ramirez MD, 500 mg at 03/10/22 5630    metoprolol succinate (TOPROL XL) extended release tablet 50 mg, 50 mg, Oral, Daily, Asha Ramirez MD, 50 mg at 03/10/22 5840    predniSONE (DELTASONE) tablet 5 mg, 5 mg, Oral, Daily, Asha Ramirez MD, 5 mg at 03/10/22 1904    allopurinol (ZYLOPRIM) tablet 100 mg, 100 mg, Oral, Daily, Asha Ramirez MD, 100 mg at 84/66/57 1426    folic acid (FOLVITE) tablet 1 mg, 1 mg, Oral, Daily, Nicole Neil MD, 1 mg at 03/10/22 0831  ALLERGIES     Macrodantin [nitrofurantoin macrocrystal] and Sulfa antibiotics  Immunization History   Administered Date(s) Administered    COVID-19, Pfizer Purple top, DILUTE for use, 12+ yrs, 30mcg/0.3mL dose 01/28/2021, 02/18/2021, 08/18/2021      Internal Administration   First Dose COVID-19, Pfizer Purple top, DILUTE for use, 12+ yrs, 30mcg/0.3mL dose  01/28/2021   Second Dose COVID-19, Pfizer Purple top, DILUTE for use, 12+ yrs, 30mcg/0.3mL dose   02/18/2021       Last COVID Lab SARS-CoV-2 (no units)   Date Value   01/20/2021 Not Detected     SARS-CoV-2 Antibody, Total (no units)   Date Value   01/13/2022 Non-Reactive     SARS-CoV-2, PCR (no units)   Date Value   02/25/2022 Not Detected     SARS-CoV-2, NAAT (no units)   Date Value   03/02/2022 Not Detected            PAST MEDICAL HISTORY     Past Medical History:   Diagnosis Date    Abnormal EKG     left bundle branch block    Acute gout involving toe of right foot 09/03/2020    Acute on chronic combined systolic (congestive) and diastolic (congestive) heart failure (HCC)     Cancer (HCC)     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    Cerebrovascular disease     Clotting disorder ([facility] 1985    thrombophlebitis after c section delivery    Depression     15 years followed by dr Lorrie Bennett Hemodialysis patient [facility])     History of blood transfusion     Hyperlipidemia     Hypertension     Hypothyroidism     Leg swelling 09/03/2020    Lymphedema of both lower extremities 09/03/2020    Peripheral vascular disease ([address]     Renal failure 11/2012    renal transplant    Thrombophlebitis     after c section delivery    Thyroid disease      SURGICAL HISTORY       Past Surgical History:   Procedure Laterality Date   [address]    one normal delivery    COLECTOMY N/A 1/15/2022    DIAGNOSTIC  LAPAROSCOPY LYSIS OF ADHESIONS performed by Ulises De Los Santos MD at [address] [place] [address] Drive CATH LAB PROCEDURE  2006    normal coronaries and 1996 normal coronaries    DIALYSIS FISTULA CREATION  march and july 2012    phase one and two patient will start dialysis when needed   [address]    transfemoral venous bypass grafts    IR IVC FILTER PLACEMENT W IMAGING  2/26/2022    IR IVC FILTER PLACEMENT W IMAGING 2/26/2022 [address] SPECIAL PROCEDURES    KIDNEY TRANSPLANT  2016    KIDNEY TRANSPLANT  2015    KIDNEY TRANSPLANT  2015    PARATHYROIDECTOMY  2006    left parathyroid  implant and parathyroidectomy    TRANSESOPHAGEAL ECHOCARDIOGRAM N/A 2/28/2022    TRANSESOPHAGEAL ECHOCARDIOGRAM WITH BUBBLE STUDY performed by Yuki Erickson MD at [address] HISTORY       Family History   Problem Relation Age of Onset    Diabetes Mother     Diabetes Father     Dementia Father      SOCIAL HISTORY       Social History     Socioeconomic History    Marital status:      Spouse name: Not on file    Number of children: Not on file    Years of education: Not on file    Highest education level: Not on file   Occupational History    Not on file   Tobacco Use    Smoking status: Former Smoker     Packs/day: 1.00     Years: 20.00     Pack years: 20.00     Quit date: 10/1/2015     Years since quittin.4    Smokeless tobacco: Never Used   Vaping Use    Vaping Use: Never used   Substance and Sexual Activity    Alcohol use: No     Comment: 2 cups coffee per day    Drug use: No    Sexual activity: Not on file   Other Topics Concern    Not on file   Social History Narrative    Not on file     Social Determinants of Health     Financial Resource Strain:     Difficulty of Paying Living Expenses: Not on file   Food Insecurity:     Worried About Running Out of Food in the Last Year: Not on file    [place] of Food in the Last Year: Not on file   Transportation Needs:     Lack of Transportation (Medical): Not on file    Lack of Transportation (Non-Medical):  Not on file   Physical Activity:     Days of Exercise per Week: Not on file    Minutes of Exercise per Session: Not on file   Stress:     Feeling of Stress : Not on file   Social Connections:     Frequency of Communication with Friends and Family: Not on file    Frequency of Social Gatherings with Friends and Family: Not on file    Attends [employer] Services: Not on file    Active Member of [address] [place] [address] or Organizations: Not on file    Attends Club or Organization Meetings: Not on file    Marital Status: Not on file   Intimate Partner Violence:     Fear of Current or Ex-Partner: Not on file    Emotionally Abused: Not on file    Physically Abused: Not on file    Sexually Abused: Not on file   Housing Stability:     Unable to Pay for Housing in the Last Year: Not on file    Number of [place] in the Last Year: Not on file    Unstable Housing in the Last Year: Not on file     · [facility]       ED Triage Vitals [03/09/22 1038]   BP Temp Temp Source Pulse Resp SpO2 Height Weight   124/85 99.3 °F (37.4 °C) Oral 95 18 100 % 5' 2\" (1.575 m) 220 lb (99.8 kg)     Vitals:    Vitals:    03/09/22 1127 03/09/22 1128 03/1955 03/10/22 0730   BP:   104/63 (!) 97/55   Pulse: 94  90 96   Resp: 20  20 14   Temp:   98 °F (36.7 °C) 97.7 °F (36.5 °C)   TempSrc:   Oral Oral   SpO2: (!) 87% 93% 100% 100%   Weight:       Height:         Physical Exam   Constitutional/General:  NAD  Head: NC/AT  Eyes: PERRL, EOMI  Mouth: Normal mucosa, no thrush   Neck: Supple, full ROM,    Pulmonary: Lungs DEC  to auscultation bilaterally. Not in respiratory distress ON O2   Cardiovascular:  Regular rate and rhythm, no murmurs, gallops, or rubs. Abdomen: Soft, + BS.    distension. Nontender. Extremities:  Warm and well perfused  Pulses:  Distal pulses intact  Skin: Warm and dry without rash SACRAL SMALL DTI   Neurologic:    UNABLE TO AROUSE  MERCHANT      DIAGNOSTIC RESULTS   RADIOLOGY:   ECHO Transesophageal    Result Date: 3/1/2022  Transesophageal Echocardiography Report (CLEMENCIA)   Demographics   Patient Name      Sheree Andino       Gender              Female                    [address] [facility] Record    [id number]           Room Number         5875  Number   Account #         [de-identified]          Procedure Date      02/28/2022   Corporate ID                         Ordering Physician  Luz Bajwa MD   Accession Number  [id number]         Referring Physician   Date of Birth     1955         Sonographer         Jack Bhatti                                                           [facility]   Age               73 year(s)         Interpreting        Luz Bajwa MD                                       Physician                                        Any Other  Procedure Type of Study   CLEMENCIA procedure:Transesophageal Echo (CLEMENCIA), Color Flow Velocity Mapping.   Procedure Date Date: 02/28/2022 Start: 11:00 AM Study Location: Portable Technical Quality: Adequate visualization Indications:R/O Endocarditis. Patient Status: Routine Height: 62 inches Weight: 220 pounds BSA: 1.99 m^2 BMI: 40.24 kg/m^2 BP: 138/79 mmHg CLEMENCIA Performed By: the attending and the sonographer  Type of Anesthesia: Moderate sedation   Findings   Left Ventricle  Normal LV segmental wall motion. Ejection fraction is visually estimated at 60 to 65%. Right Ventricle  Normal right ventricular size and function. Left Atrium  No evidence of thrombus within left atrium. Right Atrium  Normal right atrium size. Agitated saline injected for shunt evaluation shows no shunt. Mitral Valve  No vegetation. Mild mitral regurgitation is present. Tricuspid Valve  No vegetation. Aortic Valve  No vegetation. The aortic valve appears mildly sclerotic. Pulmonic Valve  No vegetation. Conclusions   Summary  Technically difficult study. No definate evidence of vegetation consistent with endocarditis. Normal LV segmental wall motion. Ejection fraction is visually estimated at 60 to 65%. Normal right ventricular size and function. Signature   ----------------------------------------------------------------  Electronically signed by Piter Landon MD(Interpreting  physician) on 03/01/2022 09:11 AM  ----------------------------------------------------------------  [email or web address] [id number]    CT HEAD WO CONTRAST    Result Date: 2/25/2022  EXAMINATION: CT OF THE HEAD WITHOUT CONTRAST  2/25/2022 3:08 am TECHNIQUE: CT of the head was performed without the administration of intravenous contrast. Dose modulation, iterative reconstruction, and/or weight based adjustment of the mA/kV was utilized to reduce the radiation dose to as low as reasonably achievable.  COMPARISON: Correlation with MRI dated 01/16/2022 HISTORY: ORDERING SYSTEM PROVIDED HISTORY: mental status change TECHNOLOGIST PROVIDED HISTORY: Has a \"code stroke\" or \"stroke alert\" been called? ->No Reason for exam:->mental status change Decision Support Exception - unselect if not a suspected or confirmed emergency medical condition->Emergency Medical Condition (MA) FINDINGS: BRAIN/VENTRICLES: There is no acute intracranial hemorrhage, mass effect or midline shift. No abnormal extra-axial fluid collection. The gray-white differentiation is maintained without evidence of an acute infarct. There is no evidence of hydrocephalus. Minimal cortical volume loss. Scattered vascular calcifications. ORBITS: The visualized portion of the orbits demonstrate no acute abnormality. SINUSES: Significant opacification of the left greater than right mastoid air cells similar to previous. Mild mucosal thickening in the left sphenoid sinus. This was also present previously. SOFT TISSUES/SKULL:  No acute abnormality of the visualized skull or soft tissues. No acute intracranial abnormality. MRI would be useful if symptoms persist. Inflammatory changes of left greater than right mastoid air cells and left sphenoid sinus similar to prior MRI. RECOMMENDATIONS: Unavailable     NM LUNG VENT/PERFUSION (VQ)    Result Date: 2/25/2022  EXAMINATION: NUCLEAR MEDICINE VENTILATION PERFUSION SCAN. 2/25/2022 TECHNIQUE: 3.5 millicuries aerosolized Tc99m DTPA was administered via mask prior to planar imaging of the lungs in multiple projections. Then, 4.2 millicuries of Tc 01A MAA was administered intravenously prior to planar imaging of the lungs in similar projections. COMPARISON: Chest CT February 25, 2022 . HISTORY: ORDERING SYSTEM PROVIDED HISTORY: R/O PE TECHNOLOGIST PROVIDED HISTORY: Reason for exam:->R/O PE What reading provider will be dictating this exam?->CRC FINDINGS: PERFUSION: Distribution of radiotracer is homogeneous. No segmental defects identified. VENTILATION: Ventilation images are unremarkable.  CHEST CT: Small infiltrate or atelectasis posterior right lung.     Negative for pulmonary embolus. CT ABDOMEN PELVIS W IV CONTRAST Additional Contrast? None    Result Date: 2/25/2022  EXAMINATION: CT OF THE ABDOMEN AND PELVIS WITH CONTRAST 2/25/2022 3:08 am TECHNIQUE: CT of the abdomen and pelvis was performed with the administration of intravenous contrast. Multiplanar reformatted images are provided for review. Dose modulation, iterative reconstruction, and/or weight based adjustment of the mA/kV was utilized to reduce the radiation dose to as low as reasonably achievable. COMPARISON: 01/17/2022 HISTORY: ORDERING SYSTEM PROVIDED HISTORY: Abdominal distention, AMS TECHNOLOGIST PROVIDED HISTORY: Reason for exam:->Abdominal distention, AMS Additional Contrast?->None Decision Support Exception - unselect if not a suspected or confirmed emergency medical condition->Emergency Medical Condition (MA) FINDINGS: Many images are partially degraded by patient motion related artifact. Low-attenuation focus in the central aspect of the left lobe of the liver as well as a tiny low-attenuation focus in the caudate lobe. There is also a tiny low-attenuation focus in the extreme inferior aspect of the right lobe. These are difficult to definitively characterize due to their small size but likely represent small hepatic cysts. Gallbladder is unremarkable. Spleen is normal in size. No evidence of acute pancreatitis. There is a E small exophytic cystic appearing lesion along the anterior aspect of the body of the pancreas measuring 7 Hounsfield units and 1.4 cm. This is unchanged dating back to 11/17/2021. This is also favored to be incidental but could be followed. No adrenal mass. No hydronephrosis. The native kidneys remains significantly atrophic and contain numerous cystic lesions. Some of these are hyperdense but also unchanged dating back to the 11/17/2021 exam and favored to represent hyperdense or hemorrhagic cysts.   Small renal cortical calcifications and or nonobstructing stones. A right lower quadrant renal transplant demonstrates no hydronephrosis. Small hiatal hernia. Mild fluid distention of a few small bowel loops in the upper abdomen. No bowel obstruction. The cecum is mobile. The appendix is not identified with certainty. There is some localized thickening of the proximal ascending colon. This does appear to be new compared to the previous examination. Moderate stool in the distal colon. Residual contrast in the colon likely due to contrast administered on the prior examination. Uterus is atrophic. Urinary bladder is largely decompressed with Chin catheter. Minimal gas in the bladder likely due to the catheter. Partial visualization of femoral to femoral bypass graft. Degenerative changes are present in the spine and pelvis. Please see separate dictated report for CT of the chest.     Thickening of the proximal ascending colon. Given the short-term change, this is favored to represent a localized area of colitis likely infectious or inflammatory. Neoplasia thought less likely but follow-up to resolution would be recommended. Moderate stool in the distal colon. Minimal distention of several proximal small bowel loops likely incidental or due to mild enteritis. Small hiatal hernia. Other chronic appearing findings. RECOMMENDATIONS: Unavailable     IR GUIDED IVC FILTER PLACEMENT    Result Date: 2/26/2022  EXAMINATION: INFERIOR VENA CAVA (IVC) FILTER INSERTION 2/26/2022 Procedural Personnel: Mercedes Deras MD HISTORY: Indication: Bleeding with IV heparin ORDERING SYSTEM PROVIDED HISTORY: IVC filter TECHNOLOGIST PROVIDED HISTORY: Reason for exam:->IVC filter Anticoagulation contraindicated SEDATION: None CONTRAST: Contrast agent: Isovue 320 Contrast volume: 30mL FLUOROSCOPY DOSE AND TYPE OR TIME AND EXPOSURES: Fluoroscopy time/Number of Images: Fluoroscopy time 1.5 minutes.  Reference air [place] kerma: [id number] PROCEDURE: Informed consent for the procedure including risks, benefits and alternatives was obtained and time-out was performed prior to the procedure. Universal protocol was followed. A suitable skin site was prepared and draped using all elements of maximal sterile barrier technique including sterile gloves, sterile gown, cap, mask, large sterile sheet, sterile ultrasound probe cover, hand hygiene, and cutaneous antisepsis. Time-out was called and the patient's order and identity were verified. Local anesthesia was administered. The vessel was sonographically evaluated and judged appropriate for access. Real time ultrasound was used to visualize needle entry into the vessel and an ultrasound image was stored in PACS. Vein accessed: Right common femoral vein. Access technique: Micropuncture set with 21 gauge needle A 0.035 guide wire was used to place a catheter into the inferior vena cava. A vena cavogram was performed. A Bard Rosa filter was deployed in routine fashion in the infrarenal IVC under fluoroscopic guidance. The sheath was removed and hemostasis was achieved with manual compression. A sterile dressing was applied. Patient tolerated procedure well. Complications: No immediate complications. Standardized report: [id number] [id number] FINDINGS: US: The access vein is patent. Inferior Vena Cavogram: The vena cava is patent and normal in size without thrombus or anomalies. Post-placement imaging: Spot filmdemonstrates the filter in the infrarenalvena cava with less than 15 degrees tilt from the vena cava access. Successful vena cava filter placement. PLAN: Retrieval intent (MIPS): The filter is potentially retrievable.  Plan/Timing For Retrieval: Ordering Physician/Contact For Retrieval Plan: Hector Fair     XR CHEST PORTABLE    Result Date: 3/9/2022  EXAMINATION: ONE XRAY VIEW OF THE CHEST 3/9/2022 10:28 am COMPARISON: 02/25/2022 HISTORY: ORDERING SYSTEM PROVIDED HISTORY: [facility] TECHNOLOGIST PROVIDED HISTORY: Reason for exam:->ams FINDINGS: The cardiac silhouette is at upper limits of normal in size. There are no findings of failure There is a linear focus seen within the right perihilar region favored to represent atelectasis. There is no focal consolidation seen within the right or left lung to suggest pneumonia. There is no pleural effusion. 1. Right perihilar discoid atelectasis. 2. There are no findings of failure or pneumonia. XR CHEST 1 VIEW    Result Date: 2/25/2022  EXAMINATION: ONE XRAY VIEW OF THE CHEST 2/25/2022 2:49 pm COMPARISON: None. HISTORY: ORDERING SYSTEM PROVIDED HISTORY: needs to be done with VQ scan TECHNOLOGIST PROVIDED HISTORY: Last chest Xray done more than 24 hours ago Reason for exam:->needs to be done with VQ scan FINDINGS: The heart is mildly enlarged. There are no findings of failure. The lungs are clear. There is no focal infiltrate or effusion. .     1. Mild cardiomegaly. There is no evidence of failure or pneumonia. CTA PULMONARY W CONTRAST    Result Date: 2/25/2022  EXAMINATION: CTA OF THE CHEST 2/25/2022 3:08 am TECHNIQUE: CTA of the chest was performed after the administration of intravenous contrast.  Multiplanar reformatted images are provided for review. MIP images are provided for review. Dose modulation, iterative reconstruction, and/or weight based adjustment of the mA/kV was utilized to reduce the radiation dose to as low as reasonably achievable. COMPARISON: Portable chest dated 01/22/2022 and CT dated 01/17/2022 HISTORY: ORDERING SYSTEM PROVIDED HISTORY: Hypoxic, SOB TECHNOLOGIST PROVIDED HISTORY: Reason for exam:->Hypoxic, SOB Decision Support Exception - unselect if not a suspected or confirmed emergency medical condition->Emergency Medical Condition (MA) FINDINGS: Pulmonary Arteries: Assessment is limited due to patient motion artifact. A small or peripheral embolism would be difficult to exclude but no large or central embolism identified.  Mediastinum: Moderately severe cardiomegaly is similar to previous. No pericardial effusion. No aortic dissection. Scattered vascular calcifications. Normal-size lymph nodes without adenopathy by size criteria. Lungs/pleura: No pneumothorax. Mild atelectasis in the right greater than left lung base. Otherwise, there has been significant improvement in aeration of the lungs since previous. Upper Abdomen: Partial visualization of the upper right kidney with cystic appearing foci as well as a partially visualized hyperdense lesion likely representing a hyperdense proteinaceous cyst and similar to previous but continued follow-up would be useful. Visualized portions of the upper abdomen demonstrate no acute abnormality. Small hiatal hernia. Soft Tissues/Bones: Degenerative changes are scattered in the spine. Allowing for patient motion artifact, no identified pulmonary embolism. Significant improvement in aeration of the lungs with some residual areas of presumed atelectasis in the right greater than left base. Cardiomegaly. RECOMMENDATIONS: Unavailable     US DUP UPPER EXTREMITY LEFT VENOUS    Result Date: 2/27/2022  EXAMINATION: VENOUS ULTRASOUND OF THE LEFT UPPER EXTREMITY, 2/27/2022 12:55 pm TECHNIQUE: Duplex ultrasound using B-mode/gray scaled imaging and Doppler spectral analysis and color flow was obtained of the deep venous structures of the left upper extremity. COMPARISON: None. HISTORY: ORDERING SYSTEM PROVIDED HISTORY: left arm edema TECHNOLOGIST PROVIDED HISTORY: Portable please if possible Reason for exam:->left arm edema What reading provider will be dictating this exam?->CRC FINDINGS: There is normal flow and compressibility of the visualized venous structures. There is no evidence of echogenic thrombus. The veins demonstrate good compressibility with normal color flow study and spectral analysis. The AV fistula is patent. No evidence of DVT.      IR ULTRASOUND GUIDANCE VASCULAR ACCESS    Result Date: 2/26/2022  EXAMINATION: INFERIOR VENA CAVA (IVC) FILTER INSERTION 2/26/2022 Procedural Personnel: Peter Escobedo MD HISTORY: Indication: Bleeding with IV heparin ORDERING SYSTEM PROVIDED HISTORY: IVC filter TECHNOLOGIST PROVIDED HISTORY: Reason for exam:->IVC filter Anticoagulation contraindicated SEDATION: None CONTRAST: Contrast agent: Isovue 320 Contrast volume: 30mL FLUOROSCOPY DOSE AND TYPE OR TIME AND EXPOSURES: Fluoroscopy time/Number of Images: Fluoroscopy time 1.5 minutes. Reference air [place] kerma: [id number] PROCEDURE: Informed consent for the procedure including risks, benefits and alternatives was obtained and time-out was performed prior to the procedure. Universal protocol was followed. A suitable skin site was prepared and draped using all elements of maximal sterile barrier technique including sterile gloves, sterile gown, cap, mask, large sterile sheet, sterile ultrasound probe cover, hand hygiene, and cutaneous antisepsis. Time-out was called and the patient's order and identity were verified. Local anesthesia was administered. The vessel was sonographically evaluated and judged appropriate for access. Real time ultrasound was used to visualize needle entry into the vessel and an ultrasound image was stored in PACS. Vein accessed: Right common femoral vein. Access technique: Micropuncture set with 21 gauge needle A 0.035 guide wire was used to place a catheter into the inferior vena cava. A vena cavogram was performed. A Bard Colbert filter was deployed in routine fashion in the infrarenal IVC under fluoroscopic guidance. The sheath was removed and hemostasis was achieved with manual compression. A sterile dressing was applied. Patient tolerated procedure well. Complications: No immediate complications. Standardized report: [id number] [id number] FINDINGS: US: The access vein is patent.  Inferior Vena Cavogram: The vena cava is patent and normal in size without thrombus or anomalies. Post-placement imaging: Spot filmdemonstrates the filter in the infrarenalvena cava with less than 15 degrees tilt from the vena cava access. Successful vena cava filter placement. PLAN: Retrieval intent (MIPS): The filter is potentially retrievable. Plan/Timing For Retrieval: Ordering Physician/Contact For Retrieval Plan: Jesicapranay Raymundo     FL MODIFIED BARIUM SWALLOW W VIDEO    Result Date: 2/26/2022  EXAMINATION: MODIFIED BARIUM SWALLOW WAS PERFORMED IN CONJUNCTION WITH SPEECH PATHOLOGY SERVICES TECHNIQUE: Fluoroscopic evaluation of the swallowing mechanism was performed using cineradiography with multiple consistency of barium product in conjunction with speech pathology services. FLUOROSCOPY DOSE AND TYPE OR TIME AND EXPOSURES: Fluoroscopy time 2.1 minutes. Air kerma 5.49 mGy COMPARISON: None HISTORY: ORDERING SYSTEM PROVIDED HISTORY: dysphagia, aspriation pna? TECHNOLOGIST PROVIDED HISTORY: Reason for exam:->dysphagia, aspriation pna? FINDINGS: There was oral delay and pharyngeal delay. Laryngeal elevation was adequate. There was retention in the piriform sinuses but not the vallecula. Transient laryngeal penetration is seen with thin liquid barium. Otherwise, no aspiration or laryngeal penetration is seen. Strands it laryngeal penetration with thin liquid barium. No evidence of aspiration. Please see separate speech pathology report for full discussion of findings and recommendations. RECOMMENDATIONS: Unavailable     US DUP LOWER EXTREMITIES BILATERAL VENOUS    Result Date: 2/25/2022  EXAMINATION: DUPLEX VENOUS ULTRASOUND OF THE BILATERAL LOWER EXTREMITIES2/25/2022 2:47 pm TECHNIQUE: Duplex ultrasound using B-mode/gray scaled imaging, Doppler spectral analysis and color flow Doppler was obtained of the deep venous structures of the lower bilateral extremities. COMPARISON: None.  HISTORY: ORDERING SYSTEM PROVIDED HISTORY: R/O DVT TECHNOLOGIST PROVIDED HISTORY: Reason for exam:->R/O DVT What reading provider will be dictating this exam?->CRC FINDINGS: Right lower extremity:  The visualized veins of the bilateral lower extremities are patent and free of echogenic thrombus. The veins demonstrate good compressibility with normal color flow study and spectral analysis. Left lower extremity: There are incompletely occlusive thrombi within the mid and distal left femoral vein and the popliteal vein. They have developed in the interim since the prior lower extremity Doppler from 11/17/2021.     1. INCOMPLETELY OCCLUSIVE THROMBI in the mid and distal LEFT femoral vein and within the popliteal vein. They have developed in the interim since the previous study from 11/17/2021. 2. No evidence of DVT in the right lower extremity. RECOMMENDATIONS: Unavailable     LABS  Recent Labs     03/09/22  1032   WBC 24.7*   HGB 10.8*   HCT 37.4   .4*        Recent Labs     03/09/22  1032 03/09/22  1032 03/10/22  0549     --  143   K 4.7  --  4.9      < > 108*   CO2 22   < > 21*   BUN 30*  --  38*   CREATININE 1.6*  --  1.7*   GFRAA 39  --  36   LABGLOM 32  --  30   GLUCOSE 96  --  75   PROT 5.6*  --  4.9*   LABALBU 2.5*  --  2.0*   CALCIUM 10.1  --  9.9   BILITOT 0.6  --  0.6   ALKPHOS 114*  --  101   AST 13  --  18   ALT 7  --  9    < > = values in this interval not displayed. No results for input(s): PROCAL in the last 72 hours.   Lab Results   Component Value Date    CRP 18.3 (H) 01/23/2022    CRP 34.6 (H) 11/17/2021     Lab Results   Component Value Date    SEDRATE 61 (H) 01/23/2022    SEDRATE 62 (H) 11/20/2021     No results found for: [id number]  Lab Results   Component Value Date    COVID19 Not Detected 03/02/2022    COVID19 Not Detected 02/25/2022     COVID-19/MARINA-COV2 LABS  Recent Labs     03/09/22  1032 03/10/22  0549   AST 13 18   ALT 7 9      MICROBIOLOGY:          Lab Results   Component Value Date    BC 5 Days no growth 02/27/2022    BC  02/25/2022     This organism was isolated in one set. Susceptibility testing is not routinely done as this  organism frequently represents skin contamination. Additional testing can be ordered by calling the  Microbiology Department.       BC 5 Days no growth 01/23/2022    ORG Staphylococcus coagulase-negative 02/25/2022    ORG Enterococcus faecalis 01/12/2022    ORG Klebsiella pneumoniae ssp pneumoniae 11/17/2021    ORG Staphylococcus coagulase-negative 11/17/2021    ORG Klebsiella pneumoniae ssp pneumoniae 11/17/2021     Lab Results   Component Value Date    BLOODCULT2 5 Days no growth 02/25/2022    BLOODCULT2 5 Days no growth 01/24/2022    BLOODCULT2 5 Days no growth 01/21/2022    ORG Staphylococcus coagulase-negative 02/25/2022    ORG Enterococcus faecalis 01/12/2022    ORG Klebsiella pneumoniae ssp pneumoniae 11/17/2021    ORG Staphylococcus coagulase-negative 11/17/2021    ORG Klebsiella pneumoniae ssp pneumoniae 11/17/2021       WOUND/ABSCESS   Date Value Ref Range Status   01/24/2022 Growth not present  Final        Urine Culture, Routine   Date Value Ref Range Status   02/26/2022 Growth not present  Final   01/23/2022 Growth not present  Final   01/12/2022 >100,000 CFU/ml  Final     MRSA Culture Only   Date Value Ref Range Status   11/17/2021 Methicillin resistant Staph aureus not isolated  Final          FINAL IMPRESSION    Patient is a 73 y.o. female who presented with   Chief Complaint   Patient presents with    Altered Mental Status     from NH    and admitted for Confusion [R41.0]  Acute respiratory failure with hypoxia ([facility] [address] [J96.01]     PT WITH ENCEPHALOPTHAY MEATBOLI VS INFECTIOUS ETIOLOGU   H/O hsv IG G +  S/P RX ASPIRATION PNEUMONIA/COLITIS  H/O COVID  KIDNEY TRANSPLANT ON IMMUNOSUPPRESSIVE  Poss UTI   MASTOIDITIS/SINUSITIS    CHECK BLOOD CX  EMPIRIC CDIFF RX check stools   F/U LABS AND CULTURES  ACYCLOVIR follow labs    piperacillin-tazobactam (ZOSYN) 3,375 mg in dextrose 5 % 50 mL IVPB extended infusion (mini-bag), Q8H       Imaging and labs were reviewed per medical records     An opportunity to ask questions was given to the patient/FAMILY and questions were answered. Thank you for involving me in the care of Audrey Ast. Please do not hesitate to call for any questions or concerns.          Electronically signed by Clifton Patrick MD on 3/10/2022 at 10:24 AM

## 2022-03-10 NOTE — CARE COORDINATION
Ss note:3/10/2022.3:41 PM No covid testing. Charting reflects previous neg test on 3-2-22. Pt presents from [facility]. Son Shameka Raza updated that 3 other SNFs unable to accept due to no bed availability. Discussed referral to [facility] son in agreement. Referral made to [facility] will await chart review and acceptance. Pt has medicare primary and has used per Wanda at [facility] has used 76 skilled days, pt only has [employer]. Will await decision from [facility] vs return to [facility].  AG James

## 2022-03-10 NOTE — PROGRESS NOTES
Physical Therapy Initial Evaluation/Plan of Care    Room #:  0708/5013-63  Patient Name: Jose Alanis  YOB: 1955  MRN: [id number]    Date of Service: 3/10/2022     Tentative placement recommendation: Subacute rehab  Equipment recommendation:  To be determined      Evaluating Physical Therapist: Marquita Hawthorne PT #[id number]      Specific Provider Orders/Date/Referring Provider :  03/09/22 2130   PT eval and treat Start: 03/09/22 2130, End: 03/09/22 2130, ONE TIME, Standing Count: 1 Occurrences, Nga Rico MD      Admitting Diagnosis:   Confusion [R41.0]  Acute respiratory failure with hypoxia ([address] [J96.01]     altered mental status    Surgery: none  Visit Diagnoses       Codes    Confusion     R41.0          Patient Active Problem List   Diagnosis    Peripheral vascular disease ([address]    Depression    Clotting disorder ([address]    Abnormal EKG    Cancer ([address]    Over weight    S/p cadaver renal transplant    ESRD (end stage renal disease) ([address]    Non morbid obesity due to excess calories    Hypertension    Leg swelling    Lymphedema of both lower extremities    Acute gout involving toe of right foot    Sepsis secondary to UTI ([address]    Acute kidney injury superimposed on CKD ([address]    Thyroid disease    Acute on chronic combined systolic (congestive) and diastolic (congestive) heart failure ([address]    Sepsis ([address]    Acute renal failure ([address]    CECILY (acute kidney injury) ([address]    Ischemic bowel disease ([address]    Altered mental status    Coma ([address]    Acute respiratory failure with hypoxia ([address]    Hypoxia    Acute metabolic encephalopathy    Acute deep vein thrombosis (DVT) of femoral vein of left lower extremity (HCC)    Anemia    Positive blood culture    Difficult intravenous access        ASSESSMENT of Current Deficits Patient exhibits decreased strength, balance, endurance, range of motion and pain left upper extremity  impairing functional mobility, transfers, tolerance to activity and participation requires assist of 2 for functional mobility, moderate assist throughout for sitting balance. Patient requires continued skilled physical therapy to address concerns listed above for increased safety and function at discharge. PHYSICAL THERAPY  PLAN OF CARE       Physical therapy plan of care is established based on physician order,  patient diagnosis and clinical assessment    Current Treatment Recommendations:    -Bed Mobility: Lower extremity exercises , Upper extremity exercises  and Trunk control activities   -Sitting Balance: Hands on support to maintain midline , Facilitate active trunk muscle engagement  and Facilitate postural control in all planes   -Endurance: Utilize Supervised activities to increase level of endurance to allow for safe functional mobility including transfers and gait     PT long term treatment goals are located in below grid    Patient and or family understand(s) diagnosis, prognosis, and plan of care. Frequency of treatments: Patient will be seen  3-5 times/week.          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    Cerebrovascular disease     Clotting disorder ([facility] [address] 1985    thrombophlebitis after c section delivery    Depression     15 years followed by dr Jefrey Bence Hemod[facility] patient [facility])     History of blood transfusion     Hyperlipidemia     Hypertension     Hypothyroidism     Leg swelling 09/03/2020    Lymphedema of both lower extremities 09/03/2020    Peripheral vascular disease ([facility] [address]     Renal failure 11/2012    renal transplant    Thrombophlebitis     after c section delivery    Thyroid disease      Past Surgical History:   Procedure Laterality Date     SECTION  1985    one normal delivery    COLECTOMY N/A 1/15/2022    DIAGNOSTIC  LAPAROSCOPY LYSIS OF ADHESIONS performed by Kesha Simons MD at [address] CATH LAB PROCEDURE      normal coronaries and  normal coronaries    DIALYSIS FISTULA CREATION  march and 2012    phase one and two patient will start dialysis when needed   [address]    transfemoral venous bypass grafts    IR IVC FILTER PLACEMENT W IMAGING  2022    IR IVC FILTER PLACEMENT W IMAGING 2022 [facility] SPECIAL PROCEDURES    KIDNEY TRANSPLANT  2016    KIDNEY TRANSPLANT  2015    KIDNEY TRANSPLANT  2015    PARATHYROIDECTOMY  2006    left parathyroid  implant and parathyroidectomy    TRANSESOPHAGEAL ECHOCARDIOGRAM N/A 2022    TRANSESOPHAGEAL ECHOCARDIOGRAM WITH BUBBLE STUDY performed by Jag Wesley MD at [address]:    Precautions: Use lift equipment for lifting patient , falls and alarm ,  hx kidney transplant     Social history: Patient from  in a skilled nursing facility  Prior to multiple hospitalizations lived alone in a apartment with Elevator to enter   Tub shower grab bars    Equipment owned: Rollator and Shower chair,       [address] [facility]   How much difficulty turning over in bed?: A Lot  How much difficulty sitting down on / standing up from a chair with arms?: Unable  How much difficulty moving from lying on back to sitting on side of bed?: A Lot  How much help from another person moving to and from a bed to a chair?: Total  How much help from another person needed to walk in hospital room?: Total  How much help from another person for climbing 3-5 steps with a railing?: Total  AM-PAC Inpatient Mobility Raw Score : 8  AM-PAC Inpatient T-Scale Score : 28.52  Mobility Inpatient CMS 0-100% Score: 86.62  Mobility Inpatient CMS G-Code Modifier : CM    Nursing cleared patient for PT evaluation. The admitting diagnosis and active problem list as listed above have been reviewed prior to the initiation of this evaluation. OBJECTIVE;   Initial Evaluation  Date: 3/10/2022 Treatment Date:     Short Term/ Long Term   Goals   Was pt agreeable to Eval/treatment? Yes  To be met in 3 days   Pain level   5/10  Left upper extremity      Bed Mobility    Rolling: Dependent assist of 1    Supine to sit: Dependent of  2    Sit to supine: Dependent of  2    Scooting: Dependent of  2    Rolling: Moderate assist of  2    Supine to sit: Moderate assist of  2    Sit to supine: Moderate assist of  2    Scooting:  Moderate assist of  2     Transfers Sit to stand: Not assessed     Sit to stand: Maximal assist of  2     ROM Limited bilateral upper extremities and lower extremities    Increase range of motion 10% of affected joints    Strength BUE:  refer to OT eval  RLE:  2+/5  LLE:  2+/5  Increase strength in affected mm groups by 1/3 grade   Balance Sitting EOB:  poor  Max progressing to mod assist   Dynamic Standing:  not assessed    Sitting EOB:  fair    Dynamic Standing: fair -     Patient is Alert & Oriented x person and follows one step directions    Sensation:  Patient  denies numbness/tingling   Edema:  yes bilateral lower extremities and bilateral upper extremities   Endurance: fair       Vitals: 3 liters nasal cannula   Blood Pressure at rest  Blood Pressure during session    Heart Rate at r est   Heart Rate during session     SPO2 at rest 99%  SPO2 during session  %     Patient education  Patient educated on role of Physical Therapy, risks of immobility, safety and plan of care,  importance of mobility while in hospital , ankle pumps, quad set and glut set for edema control, blood clot prevention and positioning for skin integrity and comfort     Patient response to education:   Pt verbalized understanding Pt demonstrated skill Pt requires further education in this area   Yes Partial Yes      Treatment:  Patient practiced and was instructed/facilitated in the following treatment: Patient performed exercises,  assisted to edge of bed,    Sat edge of bed 10 minutes with Moderate assist of 1 to increase dynamic sitting balance and activity tolerance. Returned to bed, positioned for skin integrity and edema control, transport arrived assist transferring patient to cart. Therapeutic Exercises:  ankle pumps, heel slide and hip abduction/adduction  x 15 reps. At end of session, patient in care of transport with alarm call light and phone within reach,  all lines and tubes intact, nursing notified. Patient would benefit from continued skilled Physical Therapy to improve functional independence and quality of life. Patient's/ family goals   [facility] to [facility]    Time in  5951  Time out  0927    Total Treatment Time  18 minutes    Evaluation time includes thorough review of current medical information, gathering information on past medical history/social history and prior level of function, completion of standardized testing/informal observation of tasks, assessment of data, and development of Plan of care and goals.      CPT codes:  Low Complexity PT evaluation (26111)  Non billable time 18 minutes    Dominic Tena PT

## 2022-03-10 NOTE — PROGRESS NOTES
Pharmacy Note - Renal Dosing and Extended Infusion Beta-Lactam Adjustment    Piperacillin/Tazobactam ordered for treatment of Aspiration pneumonia. Per [facility] Renal Dose Adjustment Policy and Extended Infusion Beta-Lactam Policy, 8568 mg every 12 hours will be changed to 3375 mg every 8 hours. Estimated Creatinine Clearance: Estimated Creatinine Clearance: 36 mL/min (A) (based on SCr of 1.7 mg/dL (H)). Dialysis Status, CECILY, CKD: ESRD    Rationale for Adjustment: Agent is renally eliminated and demonstrates time-dependent effect on bacterial eradication. Extended-infusion dosing strategy aims to enhance microbiologic and clinical efficacy. Pharmacy will continue to monitor renal function, cultures and sensitivities (where available) and adjust dose as necessary. Please call with any questions.     Jameson Patel, PharmD, [facility] 3/10/2022 9:00 AM

## 2022-03-10 NOTE — ED PROVIDER NOTES
HPI   Patient is a 79-year-old female with complex medical history including PVD, hypertension, lymphedema, CKD and DVT presenting to the emergency department for altered mental status. On initial evaluation, patient was awake alert and oriented x3 but slightly confused. She appeared to be close to her baseline mentation. Patient complaining of no symptoms. Initial vitals within normal limits. Patient was on supplemental oxygen via nasal cannula and saturating appropriately. Of note, patient was recently admitted to the hospital from 2/25 to 3/2/2022 for acute hypoxic respiratory failure and eventually discharged to nursing facility. Patient was brought in by ambulance from nursing facility due to altered mental status. Patient was apparently less responsive than normal at the nursing facility and was sent in for evaluation. She was answering questions appropriately and adequately following commands when initially evaluated. Review of Systems   Constitutional: Negative for chills, fatigue and fever. HENT: Negative for congestion, sinus pain, trouble swallowing and voice change. Eyes: Negative for photophobia and visual disturbance. Respiratory: Negative for cough and shortness of breath. Mild dyspnea. Cardiovascular: Negative for chest pain and palpitations. Gastrointestinal: Negative for abdominal pain, diarrhea, nausea and vomiting. Genitourinary: Negative for dysuria, frequency and urgency. Musculoskeletal: Negative for arthralgias. Skin: Negative for rash and wound. Neurological: Negative for dizziness, weakness, numbness and headaches. Psychiatric/Behavioral: Negative for behavioral problems and confusion. Physical Exam  Constitutional:       General: She is not in acute distress. Appearance: Normal appearance. She is not ill-appearing. HENT:      Head: Normocephalic and atraumatic.       Mouth/Throat:      Mouth: Mucous membranes are moist.      Pharynx: Oropharynx is clear. Eyes:      General: Scleral icterus present. Pupils: Pupils are equal, round, and reactive to light. Cardiovascular:      Rate and Rhythm: Normal rate and regular rhythm. Pulses: Normal pulses. Heart sounds: Normal heart sounds. Pulmonary:      Effort: Respiratory distress present. Breath sounds: Normal breath sounds. No wheezing or rales. Comments: Mild respiratory distress with noted conversational dyspnea. No accessory muscle use noted. Bilateral lungs clear to auscultation with no wheezes, crackles, rales or rhonchi. Abdominal:      General: There is no distension. Palpations: Abdomen is soft. Tenderness: There is no abdominal tenderness. There is no guarding or rebound. Genitourinary:     Comments: Chin catheter in place. Musculoskeletal:      Cervical back: Normal range of motion and neck supple. Skin:     General: Skin is warm and dry. Capillary Refill: Capillary refill takes less than 2 seconds. Comments: Mildly jaundiced. Neurological:      General: No focal deficit present. Mental Status: She is alert and oriented to person, place, and time. Mental status is at baseline. She is confused. Cranial Nerves: No cranial nerve deficit. Coordination: Coordination normal.      Comments: Mild confusion. Patient appears to be confabulating    Psychiatric:         Speech: Speech normal.         Behavior: Behavior normal.        Procedures   EKG: This EKG is signed and interpreted by me. Normal sinus rhythm with a ventricular rate of 94 bpm.  Left axis deviation. Left bundle branch block. DC interval normal.  QTc prolonged at 475 MS. No evidence of acute STEMI. No significant changes compared to previous on 2/25/2022. MDM   Patient is a 73-year-old female with complex medical history including PVD, hypertension, lymphedema, CKD and DVT presenting the ED due to altered mental status.   Patient was brought in by ambulance from nursing facility because she was apparently acting abnormal.  Of note, patient was recently hospitalized from 2/25 to 3/2/2022 for acute hypoxic respiratory failure and discharged to facility. On initial evaluation, patient was awake alert and oriented x3 and mildly confused. Patient was answering questions appropriately and following commands well. She seems to be mildly confused but was apparently close to her baseline mentation. Patient denying any symptoms. Patient had bedside SpO2 of 86 to 88% on room air. She was not discharged on any oxygen and was not on oxygen at the nursing facility. Patient started on 4 L via nasal cannula and responded well. Bedside SpO2 appropriate with supplemental oxygen. Work-up in the emergency department generally unremarkable. Abnormal lab work-up support patient's chronic medical conditions and appear to be at baseline levels. Patient did have moderate leukocytosis with a white count of 24.7 although there is no clear source of infection. Patient was afebrile with negative UA and chest x-ray. She had a benign abdominal exam and was denying any diarrhea. ABG obtained but generally unremarkable. Patient continued to be hypoxic when trialed off oxygen at rest.  Spoke with social work who could not set patient up on home oxygen given the time of day. Discussed the patient with Dr. Vianey De Jesus who accepted patient for admission for further work-up and evaluation for possible oxygen needs. Patient remained stable in the ED and was agreeable with plan. ED Course as of 03/09/22 1912   Wed Mar 09, 2022   1034   ATTENDING PROVIDER ATTESTATION:     I have personally performed and/or participated in the history, exam, medical decision making, and procedures and agree with all pertinent clinical information unless otherwise noted. I have also reviewed and agree with the past medical, family and social history unless otherwise noted.     I have discussed this patient in detail with the resident and provided the instruction and education regarding the evidence-based evaluation and treatment of altered mental status. History: Patient was brought in to be evaluated for altered mental status. Patient was recently admitted for acute hypoxic respiratory failure. Patient voices no complaints at this time. She denies being short of breath or having chest pain. She does not believe that she is on oxygen at baseline but was hypoxic on room air. Denies any abdominal pain, fever, or chills. She is not sure why she is here. My findings: Fleet Goltz is a 73 y.o. female whom is in no distress. Physical exam reveals patient lying in bed in no distress. No conversational dyspnea, accessory muscle use, or tachypnea. Heart regular in rhythm. Lungs coarse to auscultation. Abdomen is obese but does not appear to be distended. No appreciable fluid wave. Patient does appear mildly jaundiced and has mild scleral icterus. She has nonpitting edema of the upper and lower extremities. My plan: Symptomatic and supportive care. Appropriate labs and imaging. Electronically signed by Pau Suarez DO on 3/9/22 at 10:34 AM EST       [MS]   8101 Patient reevaluated. She states that she is feeling fine is awake alert and oriented at her baseline. Patient denying any abdominal pain whatsoever or diarrhea recently. Shared decision making with patient to would like to be discharged home. She states that her sister will be staying with her. [PP]   1450 Patient evaluated without oxygen. Her bedside SpO2 ranged from 88 to 92% at rest.  ABG pending. [PP]   1641 Dr. Lindsey Medina re-evaluated the patient and she was 95% on room air.  [PP]   1646 Resting in bed in no distress. Turned her O2 off to see how she tolerates this. It was not in her nose but was below her nose and the nurse believed that she was getting blow-by.   She is currently not complaining of any shortness of breath. Patient is alert and oriented x3. No complaints. [MS]   [phone] with Dr. Ajith Dickinson who agreed to accept the patient for observation. [PP]      ED Course User Index  [MS] Karl Vallejotrini   [PP] Gary Nuñez, DO        --------------------------------------------- PAST HISTORY ---------------------------------------------  Past Medical History:  has a past medical history of Abnormal EKG, Acute gout involving toe of right foot, Acute on chronic combined systolic (congestive) and diastolic (congestive) heart failure ([address] Cancer ([address] Cerebrovascular disease, Clotting disorder ([facility] [address] Depression, Hyperlipidemia, Hypertension, Hypothyroidism, Leg swelling, Lymphedema of both lower extremities, Peripheral vascular disease ([address] Renal failure, Thrombophlebitis, and Thyroid disease. Past Surgical History:  has a past surgical history that includes Diagnostic Cardiac Cath Lab Procedure (); femoral bypass (); parathyroidectomy (); Colonoscopy;  section (); Dialysis fistula creation (march and 2012); Kidney transplant (); Kidney transplant (); Kidney transplant (); colectomy (N/A, 1/15/2022); IR GUIDED IVC FILTER PLACEMENT (2022); and transesophageal echocardiogram (N/A, 2022). Social History:  reports that she quit smoking about 6 years ago. She has a 20.00 pack-year smoking history. She has never used smokeless tobacco. She reports that she does not drink alcohol and does not use drugs. Family History: family history includes Dementia in her father; Diabetes in her father and mother. The patients home medications have been reviewed.     Allergies: Macrodantin [nitrofurantoin macrocrystal] and Sulfa antibiotics    -------------------------------------------------- RESULTS -------------------------------------------------    LABS:  Results for orders placed or performed during the hospital encounter of 22   CBC with Auto Differential Result Value Ref Range    WBC 24.7 (H) 4.5 - 11.5 E9/L    RBC 3.42 (L) 3.50 - 5.50 E12/L    Hemoglobin 10.8 (L) 11.5 - 15.5 g/dL    Hematocrit 37.4 34.0 - 48.0 %    .4 (H) 80.0 - 99.9 fL    MCH 31.6 26.0 - 35.0 pg    MCHC 28.9 (L) 32.0 - 34.5 %    RDW 16.3 (H) 11.5 - 15.0 fL    Platelets 846 938 - 634 E9/L    MPV 13.3 (H) 7.0 - 12.0 fL    Neutrophils % 92.0 (H) 43.0 - 80.0 %    Lymphocytes % 3.0 (L) 20.0 - 42.0 %    Monocytes % 4.0 2.0 - 12.0 %    Eosinophils % 0.0 0.0 - 6.0 %    Basophils % 0.0 0.0 - 2.0 %    Neutrophils Absolute 22.97 (H) 1.80 - 7.30 E9/L    Lymphocytes Absolute 0.74 (L) 1.50 - 4.00 E9/L    Monocytes Absolute 0.99 (H) 0.10 - 0.95 E9/L    Eosinophils Absolute 0.00 (L) 0.05 - 0.50 E9/L    Basophils Absolute 0.00 0.00 - 0.20 E9/L    Metamyelocytes Relative 1.0 0.0 - 1.0 %    Anisocytosis 1+     Polychromasia 1+     Hypochromia 1+    Comprehensive Metabolic Panel w/ Reflex to MG   Result Value Ref Range    Sodium 142 132 - 146 mmol/L    Potassium reflex Magnesium 4.7 3.5 - 5.0 mmol/L    Chloride 105 98 - 107 mmol/L    CO2 22 22 - 29 mmol/L    Anion Gap 15 7 - 16 mmol/L    Glucose 96 74 - 99 mg/dL    BUN 30 (H) 6 - 23 mg/dL    CREATININE 1.6 (H) 0.5 - 1.0 mg/dL    GFR Non-African American 32 >=60 mL/min/1.73    GFR African American 39     Calcium 10.1 8.6 - 10.2 mg/dL    Total Protein 5.6 (L) 6.4 - 8.3 g/dL    Albumin 2.5 (L) 3.5 - 5.2 g/dL    Total Bilirubin 0.6 0.0 - 1.2 mg/dL    Alkaline Phosphatase 114 (H) 35 - 104 U/L    ALT 7 0 - 32 U/L    AST 13 0 - 31 U/L   Troponin   Result Value Ref Range    Troponin, High Sensitivity 204 (H) 0 - 9 ng/L   Brain Natriuretic Peptide   Result Value Ref Range    Pro-BNP 1,646 (H) 0 - 125 pg/mL   Lipase   Result Value Ref Range    Lipase 13 13 - 60 U/L   Lactic Acid   Result Value Ref Range    Lactic Acid 1.0 0.5 - 2.2 mmol/L   Urinalysis with Microscopic   Result Value Ref Range    Color, UA Yellow Straw/Yellow    Clarity, UA Clear Clear    Glucose, Ur Negative Negative mg/dL    Bilirubin Urine Negative Negative    Ketones, Urine Negative Negative mg/dL    Specific Gravity, UA 1.025 1.005 - 1.030    Blood, Urine Negative Negative    pH, UA 5.0 5.0 - 9.0    Protein, UA TRACE Negative mg/dL    Urobilinogen, Urine 0.2 <2.0 E.U./dL    Nitrite, Urine Negative Negative    Leukocyte Esterase, Urine Negative Negative    WBC, UA 1-3 0 - 5 /HPF    RBC, UA NONE 0 - 2 /HPF    Epithelial Cells, UA MODERATE /HPF    Bacteria, UA MODERATE (A) None Seen /HPF   Ammonia   Result Value Ref Range    Ammonia 23.0 11.0 - 51.0 umol/L   Troponin   Result Value Ref Range    Troponin, High Sensitivity 178 (H) 0 - 9 ng/L   Blood Gas, Arterial   Result Value Ref Range    Date Analyzed [id number]     Time Analyzed 1449     Source: Blood Arterial     pH, Blood Gas 7.380 7.350 - 7.450    PCO2 45.5 (H) 35.0 - 45.0 mmHg    PO2 92.9 75.0 - 100.0 mmHg    HCO3 26.3 (H) 22.0 - 26.0 mmol/L    B.E. 0.9 -3.0 - 3.0 mmol/L    O2 Sat 96.3 92.0 - 98.5 %    O2Hb 95.5 94.0 - 97.0 %    COHb 0.3 0.0 - 1.5 %    MetHb 0.5 0.0 - 1.5 %    O2 Content 13.6 mL/dL    HHb 3.7 0.0 - 5.0 %    tHb (est) 10.0 (L) 11.5 - 16.5 g/dL    Mode NC- 3 L/M     Date Of Collection      Time Collected      Pt Temp 37.0 C     ID [id number]     Lab [id number]     Critical(s) Notified . No Critical Values    EKG 12 Lead   Result Value Ref Range    Ventricular Rate 94 BPM    Atrial Rate 94 BPM    P-R Interval 148 ms    QRS Duration 148 ms    Q-T Interval 380 ms    QTc Calculation (Bazett) 475 ms    P Axis 23 degrees    R Axis -45 degrees    T Axis 111 degrees       RADIOLOGY:  XR CHEST PORTABLE   Final Result   1. Right perihilar discoid atelectasis.    2. There are no findings of failure or pneumonia.           ------------------------- NURSING NOTES AND VITALS REVIEWED ---------------------------  Date / Time Roomed:  3/9/2022 10:02 AM  ED Bed Assignment:  02/02    The nursing notes within the ED encounter and vital signs as below have been reviewed. Patient Vitals for the past 24 hrs:   BP Temp Temp src Pulse Resp SpO2 Height Weight   03/09/22 1128 -- -- -- -- -- 93 % -- --   03/09/22 1127 -- -- -- 94 20 (!) 87 % -- --   03/09/22 1038 124/85 99.3 °F (37.4 °C) Oral 95 18 100 % 5' 2\" (1.575 m) 220 lb (99.8 kg)       Oxygen Saturation Interpretation: Improved after treatment    ------------------------------------------ PROGRESS NOTES ------------------------------------------  Re-evaluation(s):  See ED course    Counseling:  I have spoken with the patient and discussed todays results, in addition to providing specific details for the plan of care and counseling regarding the diagnosis and prognosis. Their questions are answered at this time and they are agreeable with the plan of admission.    --------------------------------- ADDITIONAL PROVIDER NOTES ---------------------------------  Consultations:  Time: 1805. Spoke with Dr. Sergio Xiao. Discussed case. They will admit the patient. This patient's ED course included: a personal history and physicial examination, re-evaluation prior to disposition, multiple bedside re-evaluations, cardiac monitoring and continuous pulse oximetry    This patient has remained hemodynamically stable during their ED course. Diagnosis:  1. Acute respiratory failure with hypoxia ([address]    2. Confusion        Disposition:  Patient's disposition: Admit for Observation, tele  Patient's condition is stable.          José Antonio Flowers DO  Resident  03/09/22 4113

## 2022-03-10 NOTE — PLAN OF CARE
Patient's chart updated to reflect:      . -HF discharge instructions.       Reina Smith RN   Heart Failure Navigator

## 2022-03-10 NOTE — CONSULTS
Inpatient Cardiology Consultation      [facility]  Consulting Physician: Soundra Opitz, MD    Requesting Physician:  Steven Arias MD    Date of Consultation: 3/10/2022    HISTORY OF PRESENT ILLNESS OF Aleena Rosales located in  room [id number]/[id number]:     Aleena Rosales is a 73 y.o. female  known to Dr. Kieran Silva MD    Patient has h/o preserved left ventricular systolic function (CLEMENCIA  1/03/8185), stress test with no significant reversibility 6/16/2015, paroxysmal atrial fibrillation (on Eliquis), chronic left bundle branch block, HTN, HLD,  s/p kidney transplant,CKD, H/o DVT, s/p IVC on AC now, PVD, s/p thyroidectomy for cancer, gout, obesity    She was recently treated in hospitalfrom 2/25/2020 to 3/7/2022 because of acute hypoxic respiratory failure likely due to aspiration pneumonia with positive blood culture and acute metabolic encephalopathy    Patient was brought from [address] [place] [address] and rehab facility to ED of [address] [place] [address] on 3/9/22 due to new mental status changes. She appears lethargic, open eyes to loud verbal stimuli but immediately closes them back. She was admitted with diagnosis acute respiratory failure with hypoxia and confusion. It is also suspected- aspiration of stomach content/asp pneumonia          Please note: past medical records were reviewed per electronic medical record (EMR) - see detailed reports under Past Medical/ Surgical History.    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    Cerebrovascular disease     Clotting disorder ([facility] [address] 1985    thrombophlebitis after c section delivery    Depression     15 years followed by dr Kathrine Coleman Hemodialysis patient [facility])  History of blood transfusion     Hyperlipidemia     Hypertension     Hypothyroidism     Leg swelling 09/03/2020    Lymphedema of both lower extremities 09/03/2020    Peripheral vascular disease ([address]     Renal failure 11/2012    renal transplant    Thrombophlebitis     after c section delivery    Thyroid disease        Past Surgical History:    Past Surgical History:   Procedure Laterality Date   [address]    one normal delivery    COLECTOMY N/A 1/15/2022    DIAGNOSTIC  LAPAROSCOPY LYSIS OF ADHESIONS performed by Ramana Alford MD at [address] [place] [address] Drive CATH LAB PROCEDURE  2006    normal coronaries and 1996 normal coronaries    DIALYSIS FISTULA CREATION  march and july 2012    phase one and two patient will start dialysis when needed   [address]    transfemoral venous bypass grafts    IR IVC FILTER PLACEMENT W IMAGING  2/26/2022    IR IVC FILTER PLACEMENT W IMAGING 2/26/2022 [id number] SPECIAL PROCED[facility]    KIDNEY TRANSPLANT  2016    KIDNEY TRANSPLANT  2015    KIDNEY TRANSPLANT  2015    PARATHYROIDECTOMY  2006    left parathyroid  implant and parathyroidectomy    TRANSESOPHAGEAL ECHOCARDIOGRAM N/A 2/28/2022    TRANSESOPHAGEAL ECHOCARDIOGRAM WITH BUBBLE STUDY performed by Shilpi Pal MD at [place] [address]       Medications Prior to admit:  Prior to Admission medications    Medication Sig Start Date End Date Taking?  Authorizing Provider   pantoprazole (PROTONIX) 40 MG tablet Take 1 tablet by mouth every morning (before breakfast) 3/2/22  Yes KATINA Ashley - RAHAT   amoxicillin-clavulanate (AUGMENTIN) 875-125 MG per tablet Take 1 tablet by mouth every 12 hours for 10 days 3/2/22 3/12/22 Yes Katie Marshall MD   apixaban (ELIQUIS) 5 MG TABS tablet Take 5 mg by mouth 2 times daily Take in the morning and at bedtime for 3 months (started 2/19/2022)   Yes Historical Provider, MD   atorvastatin (LIPITOR) 10 MG tablet Take 10 mg by mouth daily   Yes Historical Provider, MD   lisinopril (PRINIVIL;ZESTRIL) 5 MG tablet take 1 tablet by mouth once daily 12/8/21  Yes Historical Provider, MD   pregabalin (LYRICA) 75 MG capsule  12/26/21  Yes Historical Provider, MD   nystatin (MYCOSTATIN) 750891 UNIT/GM powder Apply 3 times daily. 12/27/21  Yes KATINA Winter - CNP   ipratropium-albuterol (DUONEB) 0.5-2.5 (3) MG/3ML SOLN nebulizer solution Inhale 3 mLs into the lungs every 6 hours as needed for Shortness of Breath 11/27/21  Yes Leonardo Ngo MD   ondansetron (ZOFRAN-ODT) 4 MG disintegrating tablet Take 1 tablet by mouth every 8 hours as needed for Nausea or Vomiting 11/27/21  Yes Leonardo Ngo MD   allopurinol (ZYLOPRIM) 100 MG tablet Take 100 mg by mouth daily   Yes Historical Provider, MD   Cyanocobalamin (B-12 COMPLIANCE INJECTION) 1000 MCG/ML KIT Inject as directed monthly   Yes Historical Provider, MD   metoprolol succinate (TOPROL XL) 50 MG extended release tablet take 1 tablet by mouth once daily 4/17/20  Yes Lizz Ambrocio MD   desvenlafaxine succinate (PRISTIQ) 50 MG TB24 extended release tablet Take 1 tablet by mouth every evening 12/20/19  Yes Leonardo Ngo MD   cloZAPine (CLOZARIL) 50 MG tablet Take 50 mg by mouth nightly   Yes Historical Provider, MD   cycloSPORINE (SANDIMMUNE) 100 MG capsule Take 100 mg by mouth 2 times daily   Yes Historical Provider, MD   predniSONE (DELTASONE) 5 MG tablet Take 5 mg by mouth daily   Yes Historical Provider, MD   folic acid (FOLVITE) 1 MG tablet Take 1 mg by mouth daily   Yes Historical Provider, MD   docusate sodium (COLACE) 100 MG capsule Take 100 mg by mouth 2 times daily   Yes Historical Provider, MD   mycophenolate (CELLCEPT) 500 MG tablet Take 1,000 mg by mouth 2 times daily   Yes Historical Provider, MD   levothyroxine (SYNTHROID) 50 MCG tablet Take 50 mcg by mouth daily.      Yes Historical Provider, MD   budesonide (PULMICORT) 0.5 MG/2ML nebulizer suspension Take 2 mLs by nebulization 2 times daily for 10 days 11/27/21 12/7/21  Luiz Macedo MD       Current Medications:    Current Facility-Administered Medications: piperacillin-tazobactam (ZOSYN) 3,375 mg in dextrose 5 % 50 mL IVPB extended infusion (mini-bag), 3,375 mg, IntraVENous, Q8H  vancomycin (VANCOCIN) oral solution 125 mg, 125 mg, Oral, 4 times per day  acyclovir (ZOVIRAX) 350 mg in dextrose 5 % 50 mL IVPB, 5 mg/kg (Adjusted), IntraVENous, Q8H  vancomycin (VANCOCIN) 1,000 mg in dextrose 5 % 250 mL IVPB, 1,000 mg, IntraVENous, Once  apixaban (ELIQUIS) tablet 5 mg, 5 mg, Oral, BID  desvenlafaxine succinate (PRISTIQ) extended release tablet 50 mg, 50 mg, Oral, Daily  ondansetron (ZOFRAN-ODT) disintegrating tablet 4 mg, 4 mg, Oral, Q8H PRN  atorvastatin (LIPITOR) tablet 10 mg, 10 mg, Oral, Nightly  cloZAPine (CLOZARIL) tablet 50 mg, 50 mg, Oral, Nightly  [Held by provider] lisinopril (PRINIVIL;ZESTRIL) tablet 5 mg, 5 mg, Oral, Daily  levothyroxine (SYNTHROID) tablet 50 mcg, 50 mcg, Oral, Daily  pantoprazole (PROTONIX) tablet 40 mg, 40 mg, Oral, QAM AC  docusate sodium (COLACE) capsule 100 mg, 100 mg, Oral, BID  cycloSPORINE (SANDIMMUNE) capsule 100 mg, 100 mg, Oral, BID  mycophenolate (CELLCEPT) capsule 500 mg, 500 mg, Oral, BID  metoprolol succinate (TOPROL XL) extended release tablet 50 mg, 50 mg, Oral, Daily  predniSONE (DELTASONE) tablet 5 mg, 5 mg, Oral, Daily  allopurinol (ZYLOPRIM) tablet 100 mg, 100 mg, Oral, Daily  folic acid (FOLVITE) tablet 1 mg, 1 mg, Oral, Daily    Allergies:  Macrodantin [nitrofurantoin macrocrystal] and Sulfa antibiotics    Social History:    Social History     Socioeconomic History    Marital status:      Spouse name: Not on file    Number of children: Not on file    Years of education: Not on file    Highest education level: Not on file   Occupational History    Not on file   Tobacco Use    Smoking status: Former Smoker     Packs/day: 1.00     Years: 20.00     Pack years: 20.00     Quit date: 10/1/2015     Years since quittin.4    Smokeless tobacco: Never Used   Vaping Use    Vaping Use: Never used   Substance and Sexual Activity    Alcohol use: No     Comment: 2 cups coffee per day    Drug use: No    Sexual activity: Not on file   Other Topics Concern    Not on file   Social History Narrative    Not on file     Social Determinants of Health     Financial Resource Strain:     Difficulty of Paying Living Expenses: Not on file   Food Insecurity:     Worried About Running Out of Food in the Last Year: Not on file    [place] of Food in the Last Year: Not on file   Transportation Needs:     Lack of Transportation (Medical): Not on file    Lack of Transportation (Non-Medical): Not on file   Physical Activity:     Days of Exercise per Week: Not on file    Minutes of Exercise per Session: Not on file   Stress:     Feeling of Stress : Not on file   Social Connections:     Frequency of Communication with Friends and Family: Not on file    Frequency of Social Gatherings with Friends and Family: Not on file    Attends Restorationism Services: Not on file    Active Member of [address] [place] [address] or Organizations: Not on file    Attends Club or Organization Meetings: Not on file    Marital Status: Not on file   Intimate Partner Violence:     Fear of Current or Ex-Partner: Not on file    Emotionally Abused: Not on file    Physically Abused: Not on file    Sexually Abused: Not on file   Housing Stability:     Unable to Pay for Housing in the Last Year: Not on file    Number of [place] in the Last Year: Not on file    Unstable Housing in the Last Year: Not on file       Family History:   Family History   Problem Relation Age of Onset    Diabetes Mother     Diabetes Father     Dementia Father          REVIEW OF SYSTEMS:   Review of system not obtained since patient is lethargic.     PHYSICAL EXAM:   BP (!) 97/55   Pulse 96   Temp 97.7 °F (36.5 °C) (Oral)   Resp 14   Ht 5' 2\" (1.575 m)   Wt 220 lb (99.8 kg)   SpO2 100%   BMI 70.94 kg/m²   Systolic (74QCA), LLE:280 , Min:97 , ZMD:476    Diastolic (45RRS), HZL:90, Min:55, Max:63    CONST: Morbid obese. Lethargic, appears to not be in acute distress. HEENT:   Head- Normocephalic, atraumatic   Eyes- Conjunctivae pink, anicteric  Throat- Oral mucosa pink and moist  Neck-  No stridor, trachea midline, obese  neck with no jugular venous distention. No adenopathy   CHEST: Chest symmetrical and appears non-tender to palpation. No accessory muscle use or intercostal retractions  RESPIRATORY: Lung sounds - clear throughout fields; CARDIOVASCULAR:     No carotid bruits  Heart Inspection- shows no noted pulsations  Heart Ausculation- Regular  rate and rhythm, no murmur. No s3, s4 or rub   PV: Bilateral lower extremities edema. No varicosities. Pedal pulses palpable, no clubbing or cyanosis   ABDOMEN: Soft, apparently non-tender to light palpation. Bowel sounds present. MS: Lethargic. No atrophy or abnormal movements. : Deferred  SKIN: Warm and dry,  no stasis dermatitis or ulcers on legs  NEURO / PSYCH: Oriented to person, place and time. Speech clear and appropriate. Follows all commands. Pleasant affect     DATA:    ECG reviewed with Dr. Teodora Voss  Cardiac monitor: Normal sinus rhythm    Diagnostic:      XR HUMERUS LEFT (MIN 2 VIEWS)    Result Date: 3/10/2022  EXAMINATION: TWO XRAY VIEWS OF THE LEFT HUMERUS 3/10/2022 9:31 am COMPARISON: None. HISTORY: ORDERING SYSTEM PROVIDED HISTORY: limited mobility TECHNOLOGIST PROVIDED HISTORY: Reason for exam:->limited mobility FINDINGS: There is no evidence of fracture or dislocation. No significant osteo-degenerative changes or other osseous abnormalities are identified. There is increased separation of the humeral head from the osseous glenoid and AC joint. This could represent subluxation. The presence of a large synovial effusion, hematoma, or mass cannot be excluded.   CT scan of the left shoulder can be considered for further evaluation. Surgical clips are identified in the soft tissues about the mid to distal humerus. No fracture or dislocation. Increased separation of the humeral head from the osseous glenoid and AC joint, which could represent subluxation. A large synovial effusion, hematoma, or mass cannot be excluded. CT scan of the left shoulder can be considered for further evaluation. XR SHOULDER LEFT (MIN 2 VIEWS)    Result Date: 3/10/2022  EXAMINATION: THREE XRAY VIEWS OF THE LEFT SHOULDER 3/10/2022 9:31 am COMPARISON: None HISTORY: ORDERING SYSTEM PROVIDED HISTORY: limited mobility and bruising TECHNOLOGIST PROVIDED HISTORY: Reason for exam:->limited mobility and bruising FINDINGS: There is no evidence of fracture. There is increased separation of the humeral head from the osseous glenoid and the acromioclavicular joint, which could represent subluxation. Otherwise, the presence of a large synovial effusion, hematoma, or mass cannot be excluded. Clinical correlation is recommended. CT scan of the left shoulder can be performed for further evaluation. The heart is enlarged. Surgical clips are identified in the soft tissues surrounding the midportion of the humerus. No fracture. Increased separation of the humeral head from the osseous glenoid and AC joint, which could represent subluxation. Otherwise, a large synovial effusion, hematoma, or mass cannot be excluded. Clinical correlation is recommended. CT scan of the left shoulder can be performed for further evaluation. Cardiomegaly. XR CHEST PORTABLE    Result Date: 3/9/2022  EXAMINATION: ONE XRAY VIEW OF THE CHEST 3/9/2022 10:28 am COMPARISON: 02/25/2022 HISTORY: ORDERING SYSTEM PROVIDED HISTORY: ams TECHNOLOGIST PROVIDED HISTORY: Reason for exam:->ams FINDINGS: The cardiac silhouette is at upper limits of normal in size.   There are no findings of failure There is a linear focus seen within the right perihilar region favored to represent atelectasis. There is no focal consolidation seen within the right or left lung to suggest pneumonia. There is no pleural effusion. 1. Right perihilar discoid atelectasis. 2. There are no findings of failure or pneumonia. Labs:   CARDIAC ENZYMES:  Recent Labs     03/09/22  1032 03/09/22  1320   TROPHS 204* 178*     H/O TROPONIN levels    Lab Results   Component Value Date    TROPHS 178 03/09/2022    TROPHS 204 03/09/2022    TROPHS 176 02/25/2022    TROPHS 191 02/25/2022    TROPHS 73 01/23/2022    TROPHS 59 01/11/2022    TROPHS 55 01/10/2022    TROPHS 56 11/18/2021    TROPHS 88 11/17/2021    TROPHS 52 11/17/2021     Recent Labs     03/09/22  1032   PROBNP 1,646*     Lab Results   Component Value Date    PROBNP 1,646 03/09/2022    PROBNP 1,013 02/25/2022    PROBNP 2,701 01/18/2022    PROBNP 2,082 11/19/2021    PROBNP 2,666 11/17/2021     BMP:   Recent Labs     03/09/22  1032 03/10/22  0549    143   K 4.7 4.9   CO2 22 21*   BUN 30* 38*   CREATININE 1.6* 1.7*   LABGLOM 32 30   CALCIUM 10.1 9.9     Lab Results   Component Value Date    CREATININE 1.7 03/10/2022    CREATININE 1.6 03/09/2022    CREATININE 1.3 03/02/2022    CREATININE 1.6 03/01/2022    CREATININE 1.6 02/28/2022    CREATININE 1.7 02/27/2022    CREATININE 1.6 02/26/2022    CREATININE 1.7 02/25/2022    CREATININE 1.9 01/24/2022    CREATININE 1.8 01/23/2022    CREATININE 1.9 01/22/2022    CREATININE 2.0 01/22/2022    CREATININE 1.8 01/22/2022    CREATININE 1.6 01/21/2022    CREATININE 1.6 01/20/2022    CREATININE 1.7 01/19/2022    CREATININE 1.8 01/19/2022    CREATININE 2.2 01/17/2022    CREATININE 1.5 01/15/2022    CREATININE 1.4 01/14/2022     CBC:   Recent Labs     03/09/22  1032 03/10/22  1329   WBC 24.7* 20.0*   HGB 10.8* 8.6*   HCT 37.4 28.7*    145     Mag: No results for input(s): MG in the last 72 hours. Phos: No results for input(s): PHOS in the last 72 hours.   TSH:   Recent Labs     03/10/22  1329   TSH 2.270     Lab Results   Component Value Date    TSH 2.270 03/10/2022    TSH 0.376 11/17/2021     HgA1c:   Lab Results   Component Value Date    LABA1C 5.7 (H) 11/17/2021     No results found for: EAG  BNP: No results for input(s): BNP in the last 72 hours. PT/INR: No results for input(s): PROTIME, INR in the last 72 hours. APTT:No results for input(s): APTT in the last 72 hours. FASTING LIPID PANEL:No results found for: CHOL, HDL, LDLDIRECT, LDLCALC, TRIG  LIVER PROFILE:  Recent Labs     03/09/22  1032 03/10/22  0549   AST 13 18   ALT 7 9   LABALBU 2.5* 2.0*     Recent Labs     03/09/22  1032   LIPASE 13     COVID-19 Labs:  Lab Results   Component Value Date    COVID19 Not Detected 03/02/2022    COVID19 Not Detected 02/25/2022     No results for input(s): COVID19 in the last 72 hours. Cardiological testing:    ECHO transesophageal 02/28/2022      Study Location: Portable  Technical Quality: Adequate visualization     Indications:R/O Endocarditis.     Patient Status: Routine     Height: 62 inches Weight: 220 pounds BSA: 1.99 m^2 BMI: 40.24 kg/m^2     BP: 138/79 mmHg     CLEMENCIA Performed By: the attending and the sonographer      Type of Anesthesia: Moderate sedation      Findings      Left Ventricle   Normal LV segmental wall motion. Ejection fraction is visually estimated at 60 to 65%. Right Ventricle   Normal right ventricular size and function. Left Atrium   No evidence of thrombus within left atrium. Right Atrium   Normal right atrium size. Agitated saline injected for shunt evaluation shows no shunt. Mitral Valve   No vegetation. Mild mitral regurgitation is present. Tricuspid Valve   No vegetation. Aortic Valve   No vegetation. The aortic valve appears mildly sclerotic. Pulmonic Valve   No vegetation. Conclusions      Summary   Technically difficult study. No definate evidence of vegetation consistent with endocarditis.    Normal LV segmental wall motion. Ejection fraction is visually estimated at 60 to 65%. Normal right ventricular size and function. ASSESSMENT:  · Elevated troponin, chronic,  flat pattern, without reported chest pain, most probably  secondary to comorbidities  · Preserved left ventricular systolic function (CLEMENCIA  0/28/1160),   · H/o stress test with no significant reversibility 6/16/2015,   · Paroxysmal atrial fibrillation (on Eliquis), in normal sinus rhythm  · Chronic left bundle branch block,   · HTN, episode of hypotension  · HLD,   · S/p kidney transplant,   · CECILY/CKD  · H/o  DVT, s/p IVC on AC now,  ·  PVD,   · s/p thyroidectomy for cancer,       PLAN:  Continue Telemetry  Continue current therapy  Electrolytes check and correction    Renal function check   No additional cardiological testing at the moment    Further cardiac recommendations will be forthcoming pending patient clinical course. Assessment and plan discussed with Dr. Starla Rubio MD    Assessment and Plan to follow as per Dr. Starla Rubio MD    Electronically signed by CHAD Helton on 3/10/2022 at 2:42 PM       Mamta Womack [facility] MD Rachael    I have personally participated in a face-to-face and personally obtained history and performed physical exam on the date of service. I reviewed chart, vitals, labs and radiologic studies. I also participated in medical decision making with CHAD Helton on the date of service All of the assessments and recommendations are from me and I agree with all of the pertinent clinical information, assessment and treatment plan. I have reviewed and edited the note above based on my findings during my history, exam, and decision making. Please see my additional contributions to the history, physical exam, assessment, and recommendations below. HISTORY OF PRESENT ILLNESS:     Reviewed, as above    Past medical history:  Reviewed, as above. Past surgical history:  Reviewed, as above.     Current medications:  Reviewed, as above    Allergies:  Reviewed, as above    Social history:  Reviewed, as above    Family medical history:  Reviewed, as above. REVIEW OF SYSTEMS:   Reviewed, as above. PHYSICAL EXAM:   CONSTITUTIONAL: Lethargic, no apparent distress, and appears stated age  EYES:  lids and lashes normal, anicteric sclerae  HEAD:  normocepalic, without obvious abnormality, atraumatic, pink, moist mucous membranes. NECK:  Supple, symmetrical, trachea midline, no adenopathy, thyroid symmetric, not enlarged and no tenderness, skin normal  HEMATOLOGIC/LYMPHATICS:  no cervical lymphadenopathy and no supraclavicular lymphadenopathy  LUNGS:  No increased work of breathing, good air exchange, clear to auscultation bilaterally, no crackles or wheezing  CARDIOVASCULAR:  Normal apical impulse, regular rate and rhythm, normal S1 and S2, no S3 or S4, and no murmur noted and no JVD, no carotid bruit, + pedal edema, good carotid upstroke bilaterally. ABDOMEN:  Soft, nontender, no masses, no hepatomegaly or splenomegaly, BS+  CHEST: nontender to palpation, expands symmetrically  MUSCULOSKELETAL:  No clubbing no cyanosis. there is no redness, warmth, or swelling of the joints  NEUROLOGIC: Lethargic  SKIN:  no bruising or bleeding, normal skin color, texture, turgor and no redness, warmth, or swelling    BP (!) 80/40 Comment: manual  Pulse 76   Temp 97.7 °F (36.5 °C) (Axillary)   Resp 16   Ht 5' 2\" (1.575 m)   Wt 220 lb (99.8 kg)   SpO2 100%   BMI 40.24 kg/m²       No intake/output data recorded. No intake/output data recorded. DATA:   I personally reviewed the admission EKG with the following interpretation: Sinus rhythm, left axis deviation, left bundle branch block    ECHO: 1/19/2022,Summary   Mild concentric left ventricular hypertrophy. There is doppler evidence of stage I diastolic dysfunction. Ejection fraction is visually estimated at 60 to 65%. Normal right ventricular size and function. Mild tricuspid regurgitation. Stress Test: Not performed to date  Angiography: Not performed to date  Cardiology Labs:   BMP:    Lab Results   Component Value Date     03/10/2022    K 4.9 03/10/2022    K 4.7 03/09/2022     03/10/2022    CO2 21 03/10/2022    BUN 38 03/10/2022     CMP:    Lab Results   Component Value Date     03/10/2022    K 4.9 03/10/2022    K 4.7 03/09/2022     03/10/2022    CO2 21 03/10/2022    BUN 38 03/10/2022    PROT 4.9 03/10/2022     CBC:    Lab Results   Component Value Date    WBC 20.0 03/10/2022    RBC 2.67 03/10/2022    HGB 8.6 03/10/2022    HCT 28.7 03/10/2022    .5 03/10/2022    RDW 16.1 03/10/2022     03/10/2022     PT/INR:  No results found for: PTINR  PT/INR Warfarin:  No components found for: PTPATWAR, PTINRWAR  PTT:    Lab Results   Component Value Date    APTT >240.0 02/26/2022     PTT Heparin:  No components found for: APTTHEP  Magnesium:    Lab Results   Component Value Date    MG 1.9 03/01/2022     TSH:    Lab Results   Component Value Date    TSH 2.270 03/10/2022     TROPONIN:  No components found for: TROP  BNP:  No results found for: BNP  FASTING LIPID PANEL:  No results found for: CHOL, HDL, TRIG  CT HEAD WO CONTRAST   Final Result   No acute intracranial abnormality or hemorrhage. Bilateral mastoiditis and right sphenoid sinusitis. XR SHOULDER LEFT (MIN 2 VIEWS)   Final Result   No fracture. Increased separation of the humeral head from the osseous   glenoid and AC joint, which could represent subluxation. Otherwise, a large   synovial effusion, hematoma, or mass cannot be excluded. Clinical   correlation is recommended. CT scan of the left shoulder can be performed   for further evaluation. Cardiomegaly. XR HUMERUS LEFT (MIN 2 VIEWS)   Final Result   No fracture or dislocation. Increased separation of the humeral head from   the osseous glenoid and AC joint, which could represent subluxation.   A large synovial effusion, hematoma, or mass cannot be excluded. CT scan of the left   shoulder can be considered for further evaluation. XR CHEST PORTABLE   Final Result   1. Right perihilar discoid atelectasis. 2. There are no findings of failure or pneumonia. I have personally reviewed the laboratory, cardiac diagnostic and radiographic testing as outlined above:    IMPRESSION:  1. Elevated troponin: Flat pattern, secondary to comorbid conditions  2. Atrial fibrillation: Paroxysmal, now in sinus rhythm, no chronic anticoagulation with Eliquis  3. Hypotension: Secondary to sepsis and dehydration? We will start IV fluids  4. Abnormal EKG with chronic left bundle branch block  5. S/p kidney transplant  6. Acute kidney injury  7. History of DVT s/p IVC filter  8. Altered mental status  9. Chronic anticoagulation    RECOMMENDATIONS:   1. IV fluids  2. Continue current treatment  3. Basic metabolic panel and CBC in a.m.  4. Further cardiac recommendations will be forthcoming pending her clinical course and diagnostic test findings    No family at bedside    Thank you for the consult  Electronically signed by Dann Garcia MD on 3/10/2022 at 5:26 PM  NOTE: This report was transcribed using voice recognition software.  Every effort was made to ensure accuracy; however, inadvertent computerized transcription errors may be present

## 2022-03-10 NOTE — DISCHARGE INSTR - COC
Continuity of Care Form    Patient Name: Ortega Grijalva   :  1955  MRN:  [id number]    Admit date:  3/9/2022  Discharge date:  3/23/23    Code Status Order: Prior   Advance Directives:      Admitting Physician:  Luiz Macedo MD  PCP: Charity Valenzuela MD    Discharging Nurse: [facility] Unit/Room#: 7703/6635-33  Discharging Unit Phone Number: [phone]    Emergency Contact:   Extended Emergency Contact Information  Primary Emergency Contact: Mayur Cheema   [place] [address] [address]  Phone: [phone]  Mobile Phone: [phone]  Relation: Child   needed? No  Secondary Emergency Contact: Avila Antony  Mobile Phone: [phone]  Relation: Other  Preferred language: English   needed?  No    Past Surgical History:  Past Surgical History:   Procedure Laterality Date     SECTION  1985    one normal delivery    COLECTOMY N/A 1/15/2022    DIAGNOSTIC  LAPAROSCOPY LYSIS OF ADHESIONS performed by Samantha Ruvalcaba MD at [facility] CATH LAB PROCEDURE      normal coronaries and 1996 normal coronaries    DIALYSIS FISTULA CREATION  march and 2012    phase one and two patient will start dialysis when needed    [address]    transfemoral venous bypass grafts    IR IVC FILTER PLACEMENT W IMAGING  2022    IR IVC FILTER PLACEMENT W IMAGING 2022 [facility] SPECIAL PROCEDURES    KIDNEY TRANSPLANT  2016    KIDNEY TRANSPLANT  2015    KIDNEY TRANSPLANT  2015    PARATHYROIDECTOMY  2006    left parathyroid  implant and parathyroidectomy    TRANSESOPHAGEAL ECHOCARDIOGRAM N/A 2022    TRANSESOPHAGEAL ECHOCARDIOGRAM WITH BUBBLE STUDY performed by Nawaf Hutchinson MD at [facility] ENDOSCOPY       Immunization History:   Immunization History   Administered Date(s) Administered    COVID-19, Pfizer Purple top, DILUTE for use, 12+ yrs, 30mcg/0.3mL dose 2021, 2021, 2021       Active Problems:  Patient Active Problem List   Diagnosis Code    Peripheral vascular disease ([facility] I73.9    Depression F32. A    Clotting disorder ([facility]) D68.9    Abnormal EKG R94.31    Cancer ([facility]) C80.1    Over weight E66.3    S/p cadaver renal transplant Z94.0    ESRD (end stage renal disease) ([facility] N18.6    Non morbid obesity due to excess calories E66.09    Hypertension I10    Leg swelling M79.89    Lymphedema of both lower extremities I89.0    Acute gout involving toe of right foot M10.9    Sepsis secondary to UTI ([facility]) A41.9, N39.0    Acute kidney injury superimposed on CKD ([facility]) N17.9, N18.9    Thyroid disease E07.9    Acute on chronic combined systolic (congestive) and diastolic (congestive) heart failure ([facility]) I50.43    Sepsis ([facility]) A41.9    Acute renal failure ([facility] N17.9    CECILY (acute kidney injury) ([facility] N17.9    Ischemic bowel disease ([facility] K55.9    Altered mental status R41.82    Coma ([facility] R40.20    Acute respiratory failure with hypoxia ([facility]) J96.01    Hypoxia A34.77    Acute metabolic encephalopathy D89.13    Acute deep vein thrombosis (DVT) of femoral vein of left lower extremity ([facility]) I82.412    Anemia D64.9    Positive blood culture R78.81    Difficult intravenous access Z78.9       Isolation/Infection:   Isolation            No Isolation          Patient Infection Status       Infection Onset Added Last Indicated Last Indicated By Review Planned Expiration Resolved Resolved By    None active    Resolved    C-diff Rule Out 22 CLOSTRIDIUM DIFFICILE EIA (Ordered)   22 Kourtney Davis RN    Order discontinued    COVID-19 (Rule Out) 22 Respiratory Panel, Molecular, with COVID-19 (Restricted: peds pts or suitable admitted adults) (Ordered)   22 Rule-Out Test Resulted    COVID-19 (Rule Out) 22 COVID-19, Rapid (Ordered)   22 Rule-Out Test Resulted    COVID-19 2222 COVID-19   22     COVID-19 (Rule Out) 01/21/22 01/21/22 01/21/22 COVID-19 (Ordered)   01/22/22 Rule-Out Test Resulted    C-diff Rule Out 01/16/22 01/16/22 01/16/22 CLOSTRIDIUM DIFFICILE EIA (Ordered)   01/17/22 Zen Lundberg RN    Order discontinued    COVID-19 (Rule Out) 01/11/22 01/11/22 01/11/22 Respiratory Panel, Molecular, with COVID-19 (Restricted: peds pts or suitable admitted adults) (Ordered)   01/11/22 Rule-Out Test Resulted    COVID-19 (Rule Out) 01/10/22 01/10/22 01/10/22 COVID-19, Rapid (Ordered)   01/10/22 Rule-Out Test Resulted    COVID-19 (Rule Out) 11/17/21 11/17/21 11/17/21 Respiratory Panel, Molecular, with COVID-19 (Restricted: peds pts or suitable admitted adults) (Ordered)   11/18/21 Rule-Out Test Resulted    COVID-19 (Rule Out) 11/17/21 11/17/21 11/17/21 COVID-19, Rapid (Ordered)   11/17/21 Rule-Out Test Resulted            Nurse Assessment:  Last Vital Signs: /63   Pulse 90   Temp 98 °F (36.7 °C) (Oral)   Resp 20   Ht 5' 2\" (1.575 m)   Wt 220 lb (99.8 kg)   SpO2 100%   BMI 40.24 kg/m²     Last documented pain score (0-10 scale):    Last Weight:   Wt Readings from Last 1 Encounters:   03/09/22 220 lb (99.8 kg)     Mental Status:  oriented, alert, and coherent    IV Access:  - PICC - site  R Upper Arm, insertion date: removed    Nursing Mobility/ADLs:  Walking   Assisted  Transfer  Assisted  Bathing  Assisted  Dressing  Assisted  Toileting  Assisted  Feeding  Assisted  Med Admin  Assisted  Med Delivery   whole    Wound Care Documentation and Therapy:  Wound 03/09/22 Buttocks Mid (Active)   Dressing Status New dressing applied 03/1955   Wound Cleansed Cleansed with saline 03/1955   Wound Length (cm) 1 cm 03/1955   Wound Width (cm) 0.8 cm 03/1955   Wound Surface Area (cm^2) 0.8 cm^2 03/1955   Number of days: 0        Elimination:  Continence:    Bowel: Yes  Bladder: Yes  Urinary Catheter: Removal Date 3/23/22    Colostomy/Ileostomy/Ileal Conduit: No       Date of Last BM: 3/21/22  No intake or output data in the 24 hours ending 03/10/22 0718  No intake/output data recorded. Safety Concerns: At Risk for Falls    Impairments/Disabilities:      None    Nutrition Therapy:  Current Nutrition Therapy:   - Oral Diet:  Dysphagia 3 advanced    Routes of Feeding: Oral  Liquids: Thin Liquids  Daily Fluid Restriction: no  Last Modified Barium Swallow with Video (Video Swallowing Test): not done    Treatments at the Time of Hospital Discharge:   Respiratory Treatments: ***  Oxygen Therapy:  is not on home oxygen therapy. Ventilator:    - No ventilator support    Rehab Therapies: Physical Therapy and Occupational Therapy  Weight Bearing Status/Restrictions: No weight bearing restrictions  Other Medical Equipment (for information only, NOT a DME order):  {EQUIPMENT:[id number]}  Other Treatments: ***    Patient's personal belongings (please select all that are sent with patient):  None    RN SIGNATURE:  Electronically signed by Sheba Fong RN on 3/23/22 at 12:02 PM EDT    CASE MANAGEMENT/SOCIAL WORK SECTION    Inpatient Status Date: ***    Readmission Risk Assessment Score:  Readmission Risk              Risk of Unplanned Readmission:  0           Discharging to Facility/ Agency   Name: [facility]  Address: [address] [place] [address]  Phone: [phone]  Fax:  [phone]    Dialysis Facility (if applicable)   Name:  Address:  Dialysis Schedule:  Phone:  Fax:    / signature: Electronically signed by AG Michaud on 3/11/22 at 11:10 AM EST    PHYSICIAN SECTION    Prognosis: Good    Condition at Discharge: Stable    Rehab Potential (if transferring to Rehab): Good    Recommended Labs or Other Treatments After Discharge: BMP Every Monday and Thursday for 2 weeks. Send results to Dr Richi Espinal office.     Physician Certification: I certify the above information and transfer of Rose Mazin  is necessary for the continuing treatment of the diagnosis listed and that she requires [place] for greater 30 days. Update Admission H&P: {CHP DME Changes in [facility]:[id number]}    PHYSICIAN SIGNATURE:  Electronically signed by John Barber MD on 3/21/22 at 8:17 AM EDT                         HEART FAILURE  / CONGESTIVE HEART FAILURE  DISCHARGE INSTRUCTIONS:  GUIDELINES TO FOLLOW AT [id number] [place]:     MEDICATIONS:  Take your medication as directed. If you are experiencing any side effects, inform your doctor, Do not stop taking any of your medications without letting your doctor know. Check with your doctor before taking any over-the-counter medications / herbal / or dietary supplements. They may interfere with your other medications. Do not take ibuprofen (Advil or Motrin) and naproxen (Aleve) without talking to your doctor first. They could make your heart failure worse. WEIGHT MONITORING:   Weigh yourself everyday (with the same scale) around the same time of the day and write it down. (you can chart them on a calendar or keep track of them on paper. Notify your doctor of a weight gain of 3 pounds or more in 1 day   OR a total of 5 pounds or more in 1 week    Take your weight record to your doctor visits  Also, the same goes if you loose more than 3# in one day, let your heart doctor know. DIET:   Cardiac heart healthy diet- Low saturated / low trans fat, no added salt, caffeine restricted, Low sodium diet-   No more than 2,000mg (2 grams) of salt / sodium per day (which equals to a little less than  a teaspoon of salt)  If your doctor wants you on a fluid restriction. ..it is usually recommended a fluid limit of 2,000cc -  Fluid restriction- 2,000 ml (milliliters) = 64 ounces = you can have 8 glasses of fluid per day (each glass 8 ounces)    Follow a low salt diet - avoid using salt at the table, avoid / limit use of canned soups, processed / packaged foods, salted snacks, olives and pickles.   Do not use a salt substitute without checking with your doctor, they may contain a high amount of potassioum. (Mrs. Pedro Gaming is safe to use). Limit the use of alcohol       CALL YOUR DOCTOR THE FIRST DAY YOU NOTICE ANY OF THESE   SYMPTOMS:  You have a weight gain of 3 pounds or more in 1 day         OR 5 pounds or more in one week  More shortness of breath  More swelling of your stomach, legs, ankles or feet  Feeling more tired, No energy  Dry hacky cough  Dizziness  More chest pain / discomfort       (CALL 911 IF ANY OF THE FOLLOWING OCCURS  Chest pain (not relieved with nitroglycerine, if you have been prescribed this medication)  Severe shortness of breath  Faint / Pass out  Confusion / cannot think clearly  If symptoms get worse           SMOKING - TOBACCO USE:  * IF YOU SMOKE - STOP! Kick the habit. [address] Program is offered at [place] [address] and [address] [facility]. Call [phone] extension [facility] for more information. ACTIVITY:   (Ask your doctor when you will be able to return to work and before starting any exercise program.  Do not drive unless unless your doctor has given you permission to do so). Start light exercise. Even if you can only do a small amount, exercise will help you get stronger, have more energy, help manage your weight and decrease  stress. Walking is an easy way to get exercise. Start out slowly and  increase the amount you walk as tolerated  If you become short of breath, dizzy or have chest pain; stop, sit down, and rest.  If you feel \"wiped out\" the day after you exercise, walk at a slower pace or for a shorter distance. You can gradually increase the pace or amount of time.             (Do not exercise right after a meal or in extreme temperatures, such as above 85 degrees, if the air is really humid, or wind chill is less than 20 degrees)                                             ADDITIONAL INFORMATION:  Avoid getting sick from colds and the flu. Stay away from friends or family that you know may have a contagious illness  Get plenty of rest   Get a flu shot each year. Get a pneumococcal vaccine shot. If you have had one before, ask your doctor whether you need another dose. My Goal for Self-management of Heart Failure Includes 5 steps :    1. Notice a change in symptoms ( weight gain, short of breath, leg swelling, decreased activity level, bloating. ...)    2. Evaluate the change: (use the Heart Failure Zones )     3. Decide to take action: decide what your options are, such as: (call your doctor for an extra visit, take a prescribed medication, such as your water pill if your doctor has given you directions to do so, [address])    4. Come up with a strategy:  (now you call the doctor for advice / appointment. This is where you take action!!! Do not wait, catch the symptom early and treat it before it worsens. 5. Evaluate the response: The next day, check your Heart Failure Zones: are you in the GREEN ZONE (safe zone)? Worsening symptoms of YELLOW ZONE? Or have you moved to the RED ZONE and need to call 911 or go to the Emergency Room for evaluation? Call your doctor's office to update them on your symptoms of heart failure. Learning About Heart Failure Zones  What are heart failure zones? Heart failure zones give you an easy way to see changes in your heart failure symptoms. They also tell you when you need to get help. Check every day to see which zone you are in. Green zone. You are doing well. This is where you want to be. Your weight is stable. It's not going up or down. You breathe easily. You are sleeping well. You are able to lie flat without shortness of breath. You can do your usual activities. Yellow zone. Be careful. Your symptoms are changing. Call your doctor. You have new or increased shortness of breath. You are dizzy or lightheaded, or you feel like you may faint.   You have sudden weight gain, such as more than 2 to 3 pounds in a day or 5 pounds in a week. (Your doctor may suggest a different range of weight gain.)  You have increased swelling in your legs, ankles, or feet. You are so tired or weak that you can't do your usual activities. You are not sleeping well. Shortness of breath wakes you up at night. You need extra pillows. Red zone. 911  This is an emergency. Call . You have symptoms of sudden heart failure. For example: You have severe trouble breathing. You cough up pink, foamy mucus. You have a new irregular or fast heartbeat. You have symptoms of a heart attack. These may include:  Chest pain or pressure, or a strange feeling in the chest.  Sweating. Shortness of breath. Nausea or vomiting. Pain, pressure, or a strange feeling in the back, neck, jaw, or upper belly or in one or both shoulders or arms. Lightheadedness or sudden weakness. A fast or irregular heartbeat. If you have symptoms of a heart attack 911 : After you call , the  may tell you to chew 1 adult-strength or 2 to 4 low-dose aspirin. Wait for an ambulance. Do not try to drive yourself. Follow-up care is a key part of your treatment and safety. Be sure to make and go to all appointments, and call your doctor if you are having problems. It's also a good idea to know your test results and keep a list of the medicines you take. Where can you learn more? Go to https://[employer]. org and sign in to your [employer] account. Enter T174 in the [facility] box to learn more about \"Learning About Heart Failure Zones. \"     If you do not have an account, please click on the \"Sign Up Now\" link. Current as of: August 31, 2020               Content Version: 12.9  © 7001-3249 [employer]wise, [employer]. Care instructions adapted under license by [facility] ([facility]).  If you have questions about a medical condition or this instruction, always ask your healthcare professional. [employer] disclaims any warranty or liability for your use of this information.

## 2022-03-10 NOTE — CONSULTS
Consult ordered by Dr. Sneha Chacon, Reason for consult is hypoxia    CHIEF COMPLAINT / HPI:    73 f hx as below, recent admission end of last month with colitis, bacteremia, asp pneumonia. Presented from NH with hypoxia, confusion for the last day. In the ER was hypoxic requiring NC O2, appeared at baseline, Data as below significant for elevated trop, CECILY, elevated BNP,  Leukocytosis, CXR with effusion/edema/cm    She is started on abx, broad spectrum, cont on immune suppressors re cadaveric kidney transplant status. Lethargic/sleepy, unable to provide HPI/systemic review. Data gathered from reviewing the medical record and discussion with ancillary staff. Past Medical History:    Reviewed; no changes. Past Medical History:   Diagnosis Date    Abnormal EKG     left bundle branch block    Acute gout involving toe of right foot 09/03/2020    Acute on chronic combined systolic (congestive) and diastolic (congestive) heart failure (HCC)     Cancer (HCC)     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    Cerebrovascular disease     Clotting disorder ([facility] [address] 1985    thrombophlebitis after c section delivery    Depression     15 years followed by dr Robert Freitas Hem[facility] patient [facility])     History of blood transfusion     Hyperlipidemia     Hypertension     Hypothyroidism     Leg swelling 09/03/2020    Lymphedema of both lower extremities 09/03/2020    Peripheral vascular disease ([facility] [address]     Renal failure 11/2012    renal transplant    Thrombophlebitis     after c section delivery    Thyroid disease        Social History:    Reviewed; no changes.    Social History     Socioeconomic History    Marital status:      Spouse name: Not on file    Number of children: Not on file    Years of education: Not on file    Highest education level: Not on file   Occupational History    Not on file   Tobacco Use    Smoking status: Former Smoker     Packs/day: 1.00     Years: 20.00     Pack years: 20.00     Quit date: 10/1/2015     Years since quittin.4    Smokeless tobacco: Never Used   Vaping Use    Vaping Use: Never used   Substance and Sexual Activity    Alcohol use: No     Comment: 2 cups coffee per day    Drug use: No    Sexual activity: Not on file   Other Topics Concern    Not on file   Social History Narrative    Not on file     Social Determinants of Health     Financial Resource Strain:     Difficulty of Paying Living Expenses: Not on file   Food Insecurity:     Worried About Running Out of Food in the Last Year: Not on file    [place] of Food in the Last Year: Not on file   Transportation Needs:     Lack of Transportation (Medical): Not on file    Lack of Transportation (Non-Medical): Not on file   Physical Activity:     Days of Exercise per Week: Not on file    Minutes of Exercise per Session: Not on file   Stress:     Feeling of Stress : Not on file   Social Connections:     Frequency of Communication with Friends and Family: Not on file    Frequency of Social Gatherings with Friends and Family: Not on file    Attends Zoroastrianism Services: Not on file    Active Member of [address] [place] [address] or Organizations: Not on file    Attends Club or Organization Meetings: Not on file    Marital Status: Not on file   Intimate Partner Violence:     Fear of Current or Ex-Partner: Not on file    Emotionally Abused: Not on file    Physically Abused: Not on file    Sexually Abused: Not on file   Housing Stability:     Unable to Pay for Housing in the Last Year: Not on file    Number of [place] in the Last Year: Not on file    Unstable Housing in the Last Year: Not on file       Allergies: Allergies   Allergen Reactions    Macrodantin [Nitrofurantoin Macrocrystal]     Sulfa Antibiotics        Meds: reviewed   Prior to Admission medications    Medication Sig Start Date End Date Taking?  Authorizing Provider   pantoprazole (PROTONIX) 40 MG tablet Take 1 tablet by mouth every morning (before breakfast) 3/2/22  Yes Lilian Mortensen, APRN - CNP   amoxicillin-clavulanate (AUGMENTIN) 875-125 MG per tablet Take 1 tablet by mouth every 12 hours for 10 days 3/2/22 3/12/22 Yes Jeanine Monroy MD   apixaban (ELIQUIS) 5 MG TABS tablet Take 5 mg by mouth 2 times daily Take in the morning and at bedtime for 3 months (started 2/19/2022)   Yes Historical Provider, MD   atorvastatin (LIPITOR) 10 MG tablet Take 10 mg by mouth daily   Yes Historical Provider, MD   lisinopril (PRINIVIL;ZESTRIL) 5 MG tablet take 1 tablet by mouth once daily 12/8/21  Yes Historical Provider, MD   pregabalin (LYRICA) 75 MG capsule  12/26/21  Yes Historical Provider, MD   nystatin (MYCOSTATIN) 824805 UNIT/GM powder Apply 3 times daily.  12/27/21  Yes Sonam Blanco APRN - CNP   ipratropium-albuterol (DUONEB) 0.5-2.5 (3) MG/3ML SOLN nebulizer solution Inhale 3 mLs into the lungs every 6 hours as needed for Shortness of Breath 11/27/21  Yes Georgina Mosqueda MD   ondansetron (ZOFRAN-ODT) 4 MG disintegrating tablet Take 1 tablet by mouth every 8 hours as needed for Nausea or Vomiting 11/27/21  Yes Georgina Mosqueda MD   allopurinol (ZYLOPRIM) 100 MG tablet Take 100 mg by mouth daily   Yes Historical Provider, MD   Cyanocobalamin (B-12 COMPLIANCE INJECTION) 1000 MCG/ML KIT Inject as directed monthly   Yes Historical Provider, MD   metoprolol succinate (TOPROL XL) 50 MG extended release tablet take 1 tablet by mouth once daily 4/17/20  Yes Chelo Cheema MD   desvenlafaxine succinate (PRISTIQ) 50 MG TB24 extended release tablet Take 1 tablet by mouth every evening 12/20/19  Yes Georgina Mosqueda MD   cloZAPine (CLOZARIL) 50 MG tablet Take 50 mg by mouth nightly   Yes Historical Provider, MD   cycloSPORINE (SANDIMMUNE) 100 MG capsule Take 100 mg by mouth 2 times daily   Yes Historical Provider, MD   predniSONE (DELTASONE) 5 MG tablet Take 5 mg by mouth daily   Yes Historical Provider, MD   folic acid (FOLVITE) 1 MG tablet Take 1 mg by mouth daily Yes Historical Provider, MD   docusate sodium (COLACE) 100 MG capsule Take 100 mg by mouth 2 times daily   Yes Historical Provider, MD   mycophenolate (CELLCEPT) 500 MG tablet Take 1,000 mg by mouth 2 times daily   Yes Historical Provider, MD   levothyroxine (SYNTHROID) 50 MCG tablet Take 50 mcg by mouth daily. Yes Historical Provider, MD   budesonide (PULMICORT) 0.5 MG/2ML nebulizer suspension Take 2 mLs by nebulization 2 times daily for 10 days 11/27/21 12/7/21  Brandi Mcghee MD       Family hx:   Family History   Problem Relation Age of Onset    Diabetes Mother     Diabetes Father     Dementia Father        REVIEW OF SYSTEMS:      Unable to assess    PHYSICAL EXAM:        VITALS:  BP (!) 97/55   Pulse 96   Temp 97.7 °F (36.5 °C) (Oral)   Resp 14   Ht 5' 2\" (1.575 m)   Wt 220 lb (99.8 kg)   SpO2 100%   BMI 40.24 kg/m²  on 3 L NC    24HR INTAKE/OUTPUT:      Intake/Output Summary (Last 24 hours) at 3/10/2022 1247  Last data filed at 3/10/2022 0730  Gross per 24 hour   Intake 0 ml   Output --   Net 0 ml     Level of Alertness:   Confused, lethargic  Orientation:  Oriented to zero   CONSTITUTIONAL:   appears stated age  Throat: clear, no ulcerations or exudate  Ears:clear, no discharge  Neck:supple no LAP, or masses  LUNGS:  Basilar crackles, no wheez  CARDIOVASCULAR: irregular NL S1 and S2 and no M/R/G  ABDOMEN:  normal bowel sounds, distended and non-tender to palpation  EXT: 1+ edema, no calf tenderness. Pulses are present bilaterally.   NEUROLOGIC:  Withdraws to pain  SKIN:  Warm and dry  Psych: unable to assess    Ventilator Settings:Vent Information  SpO2: 100 %      DATA:    CBC:  Recent Labs     03/09/22  1032   WBC 24.7*   RBC 3.42*   HGB 10.8*   HCT 37.4      .4*   MCH 31.6   MCHC 28.9*   RDW 16.3*      BMP:  Recent Labs     03/09/22  1032 03/10/22  0549    143   K 4.7 4.9    108*   CO2 22 21*   BUN 30* 38*   CREATININE 1.6* 1.7*   CALCIUM 10.1 9.9   GLUCOSE 96 75 ABG:  Lab Results   Component Value Date    PH 7.380 03/09/2022    PCO2 45.5 03/09/2022    PO2 92.9 03/09/2022    HCO3 26.3 03/09/2022    O2SAT 96.3 03/09/2022       Cultures:  No results for input(s): BC in the last 72 hours. No results for input(s): Jesscia Cava in the last 72 hours. No results for input(s): CULTRESP in the last 72 hours. Radiology Review:  Pertinent images / reports were reviewed as a part of this visit. reveals the following:  XR SHOULDER LEFT (MIN 2 VIEWS)   Final Result   No fracture. Increased separation of the humeral head from the osseous   glenoid and AC joint, which could represent subluxation. Otherwise, a large   synovial effusion, hematoma, or mass cannot be excluded. Clinical   correlation is recommended. CT scan of the left shoulder can be performed   for further evaluation. Cardiomegaly. XR HUMERUS LEFT (MIN 2 VIEWS)   Final Result   No fracture or dislocation. Increased separation of the humeral head from   the osseous glenoid and AC joint, which could represent subluxation. A large   synovial effusion, hematoma, or mass cannot be excluded. CT scan of the left   shoulder can be considered for further evaluation. XR CHEST PORTABLE   Final Result   1. Right perihilar discoid atelectasis. 2. There are no findings of failure or pneumonia.              Assessment:     Bibasilar atelectasis    Suspect aspiration of stomach contents/asp pneumonia    Hypoxemia on NC    CECILY sp kidney transplant    NSTEMI    Metabolic encephalopathy    HX DVT SP IVC, on AC now        Plan:     o2 to keep sat> 90%, wean as tolerated    Head ct, TOX screen, ammonia level, abg    Cont abx, FU CX    Monitor I/O, RFP closely, maintain even I/O for now    Aspiration precautions, speech eval    Consult cardiology recommended    [facility], monitor for signs of bleeding      Discussed with RN re management      Electronically signed by Kvng Wallace MD on 3/10/2022 at 12:47 PM

## 2022-03-11 ENCOUNTER — APPOINTMENT (OUTPATIENT)
Dept: INTERVENTIONAL RADIOLOGY/VASCULAR | Age: 67
DRG: 177 | End: 2022-03-11
Payer: MEDICARE

## 2022-03-11 PROBLEM — J96.01 ACUTE RESPIRATORY FAILURE WITH HYPOXEMIA (HCC): Status: ACTIVE | Noted: 2022-03-11

## 2022-03-11 LAB
ALBUMIN SERPL-MCNC: 2.1 G/DL (ref 3.5–5.2)
ALP BLD-CCNC: 95 U/L (ref 35–104)
ALT SERPL-CCNC: 6 U/L (ref 0–32)
ANION GAP SERPL CALCULATED.3IONS-SCNC: 12 MMOL/L (ref 7–16)
AST SERPL-CCNC: 15 U/L (ref 0–31)
BASOPHILS ABSOLUTE: 0 E9/L (ref 0–0.2)
BASOPHILS RELATIVE PERCENT: 0 % (ref 0–2)
BILIRUB SERPL-MCNC: 0.4 MG/DL (ref 0–1.2)
BUN BLDV-MCNC: 43 MG/DL (ref 6–23)
C DIFF TOXIN/ANTIGEN: ABNORMAL
CALCIUM SERPL-MCNC: 9.3 MG/DL (ref 8.6–10.2)
CHLORIDE BLD-SCNC: 108 MMOL/L (ref 98–107)
CO2: 23 MMOL/L (ref 22–29)
CREAT SERPL-MCNC: 2.2 MG/DL (ref 0.5–1)
EOSINOPHILS ABSOLUTE: 0.39 E9/L (ref 0.05–0.5)
EOSINOPHILS RELATIVE PERCENT: 3 % (ref 0–6)
GFR AFRICAN AMERICAN: 27
GFR NON-AFRICAN AMERICAN: 22 ML/MIN/1.73
GLUCOSE BLD-MCNC: 75 MG/DL (ref 74–99)
HCT VFR BLD CALC: 30.5 % (ref 34–48)
HEMOGLOBIN: 8.5 G/DL (ref 11.5–15.5)
HYPOCHROMIA: ABNORMAL
LACTIC ACID: 0.8 MMOL/L (ref 0.5–2.2)
LYMPHOCYTES ABSOLUTE: 1.17 E9/L (ref 1.5–4)
LYMPHOCYTES RELATIVE PERCENT: 9 % (ref 20–42)
MCH RBC QN AUTO: 31 PG (ref 26–35)
MCHC RBC AUTO-ENTMCNC: 27.9 % (ref 32–34.5)
MCV RBC AUTO: 111.3 FL (ref 80–99.9)
MONOCYTES ABSOLUTE: 0.39 E9/L (ref 0.1–0.95)
MONOCYTES RELATIVE PERCENT: 3 % (ref 2–12)
MYELOCYTE PERCENT: 1 % (ref 0–0)
NEUTROPHILS ABSOLUTE: 11.05 E9/L (ref 1.8–7.3)
NEUTROPHILS RELATIVE PERCENT: 84 % (ref 43–80)
ORGANISM: ABNORMAL
OVALOCYTES: ABNORMAL
PDW BLD-RTO: 15.9 FL (ref 11.5–15)
PLATELET # BLD: 144 E9/L (ref 130–450)
PMV BLD AUTO: 13.6 FL (ref 7–12)
POIKILOCYTES: ABNORMAL
POLYCHROMASIA: ABNORMAL
POTASSIUM SERPL-SCNC: 4.2 MMOL/L (ref 3.5–5)
RBC # BLD: 2.74 E12/L (ref 3.5–5.5)
SODIUM BLD-SCNC: 143 MMOL/L (ref 132–146)
TOTAL PROTEIN: 4.8 G/DL (ref 6.4–8.3)
URINE CULTURE, ROUTINE: ABNORMAL
VANCOMYCIN RANDOM: 12.3 MCG/ML (ref 5–40)
WBC # BLD: 13 E9/L (ref 4.5–11.5)

## 2022-03-11 PROCEDURE — 2580000003 HC RX 258: Performed by: INTERNAL MEDICINE

## 2022-03-11 PROCEDURE — 80053 COMPREHEN METABOLIC PANEL: CPT

## 2022-03-11 PROCEDURE — 6360000002 HC RX W HCPCS: Performed by: INTERNAL MEDICINE

## 2022-03-11 PROCEDURE — C1751 CATH, INF, PER/CENT/MIDLINE: HCPCS

## 2022-03-11 PROCEDURE — 96361 HYDRATE IV INFUSION ADD-ON: CPT

## 2022-03-11 PROCEDURE — 85025 COMPLETE CBC W/AUTO DIFF WBC: CPT

## 2022-03-11 PROCEDURE — 36415 COLL VENOUS BLD VENIPUNCTURE: CPT

## 2022-03-11 PROCEDURE — 1200000000 HC SEMI PRIVATE

## 2022-03-11 PROCEDURE — 2500000003 HC RX 250 WO HCPCS: Performed by: SPECIALIST

## 2022-03-11 PROCEDURE — 83605 ASSAY OF LACTIC ACID: CPT

## 2022-03-11 PROCEDURE — 2700000000 HC OXYGEN THERAPY PER DAY

## 2022-03-11 PROCEDURE — 36410 VNPNXR 3YR/> PHY/QHP DX/THER: CPT

## 2022-03-11 PROCEDURE — 76000 FLUOROSCOPY <1 HR PHYS/QHP: CPT

## 2022-03-11 PROCEDURE — 6360000002 HC RX W HCPCS: Performed by: SPECIALIST

## 2022-03-11 PROCEDURE — 99232 SBSQ HOSP IP/OBS MODERATE 35: CPT | Performed by: INTERNAL MEDICINE

## 2022-03-11 PROCEDURE — 96366 THER/PROPH/DIAG IV INF ADDON: CPT

## 2022-03-11 PROCEDURE — B5161ZA FLUOROSCOPY OF RIGHT SUBCLAVIAN VEIN USING LOW OSMOLAR CONTRAST, GUIDANCE: ICD-10-PCS | Performed by: INTERNAL MEDICINE

## 2022-03-11 PROCEDURE — 6370000000 HC RX 637 (ALT 250 FOR IP): Performed by: SPECIALIST

## 2022-03-11 PROCEDURE — 80202 ASSAY OF VANCOMYCIN: CPT

## 2022-03-11 PROCEDURE — 2580000003 HC RX 258: Performed by: SPECIALIST

## 2022-03-11 PROCEDURE — 05H533Z INSERTION OF INFUSION DEVICE INTO RIGHT SUBCLAVIAN VEIN, PERCUTANEOUS APPROACH: ICD-10-PCS | Performed by: INTERNAL MEDICINE

## 2022-03-11 PROCEDURE — 06HM33Z INSERTION OF INFUSION DEVICE INTO RIGHT FEMORAL VEIN, PERCUTANEOUS APPROACH: ICD-10-PCS | Performed by: INTERNAL MEDICINE

## 2022-03-11 PROCEDURE — 92610 EVALUATE SWALLOWING FUNCTION: CPT | Performed by: SPEECH-LANGUAGE PATHOLOGIST

## 2022-03-11 PROCEDURE — 92526 ORAL FUNCTION THERAPY: CPT | Performed by: SPEECH-LANGUAGE PATHOLOGIST

## 2022-03-11 PROCEDURE — 6370000000 HC RX 637 (ALT 250 FOR IP): Performed by: INTERNAL MEDICINE

## 2022-03-11 PROCEDURE — 76937 US GUIDE VASCULAR ACCESS: CPT

## 2022-03-11 RX ORDER — HEPARIN SODIUM (PORCINE) LOCK FLUSH IV SOLN 100 UNIT/ML 100 UNIT/ML
1 SOLUTION INTRAVENOUS EVERY 12 HOURS SCHEDULED
Status: DISCONTINUED | OUTPATIENT
Start: 2022-03-11 | End: 2022-03-23 | Stop reason: HOSPADM

## 2022-03-11 RX ORDER — LEVOFLOXACIN 5 MG/ML
750 INJECTION, SOLUTION INTRAVENOUS
Status: DISCONTINUED | OUTPATIENT
Start: 2022-03-11 | End: 2022-03-23 | Stop reason: HOSPADM

## 2022-03-11 RX ORDER — HEPARIN SODIUM (PORCINE) LOCK FLUSH IV SOLN 100 UNIT/ML 100 UNIT/ML
1 SOLUTION INTRAVENOUS PRN
Status: DISCONTINUED | OUTPATIENT
Start: 2022-03-11 | End: 2022-03-23 | Stop reason: HOSPADM

## 2022-03-11 RX ORDER — SODIUM CHLORIDE 0.9 % (FLUSH) 0.9 %
5-40 SYRINGE (ML) INJECTION EVERY 12 HOURS SCHEDULED
Status: DISCONTINUED | OUTPATIENT
Start: 2022-03-11 | End: 2022-03-23 | Stop reason: HOSPADM

## 2022-03-11 RX ORDER — SODIUM CHLORIDE 0.9 % (FLUSH) 0.9 %
5-40 SYRINGE (ML) INJECTION PRN
Status: DISCONTINUED | OUTPATIENT
Start: 2022-03-11 | End: 2022-03-23 | Stop reason: HOSPADM

## 2022-03-11 RX ORDER — SODIUM CHLORIDE 9 MG/ML
INJECTION, SOLUTION INTRAVENOUS ONCE
Status: COMPLETED | OUTPATIENT
Start: 2022-03-11 | End: 2022-03-11

## 2022-03-11 RX ORDER — SODIUM CHLORIDE 9 MG/ML
25 INJECTION, SOLUTION INTRAVENOUS PRN
Status: DISCONTINUED | OUTPATIENT
Start: 2022-03-11 | End: 2022-03-23 | Stop reason: HOSPADM

## 2022-03-11 RX ADMIN — SODIUM CHLORIDE: 9 INJECTION, SOLUTION INTRAVENOUS at 17:38

## 2022-03-11 RX ADMIN — ACYCLOVIR SODIUM 350 MG: 50 INJECTION, SOLUTION INTRAVENOUS at 06:18

## 2022-03-11 RX ADMIN — PIPERACILLIN AND TAZOBACTAM 3375 MG: 3; .375 INJECTION, POWDER, FOR SOLUTION INTRAVENOUS at 11:31

## 2022-03-11 RX ADMIN — PIPERACILLIN AND TAZOBACTAM 3375 MG: 3; .375 INJECTION, POWDER, FOR SOLUTION INTRAVENOUS at 02:33

## 2022-03-11 RX ADMIN — PREDNISONE 5 MG: 5 TABLET ORAL at 11:32

## 2022-03-11 RX ADMIN — Medication 10 ML: at 22:48

## 2022-03-11 RX ADMIN — DESVENLAFAXINE 50 MG: 50 TABLET, EXTENDED RELEASE ORAL at 11:32

## 2022-03-11 RX ADMIN — FOLIC ACID 1 MG: 1 TABLET ORAL at 11:32

## 2022-03-11 RX ADMIN — CYCLOSPORINE 100 MG: 25 CAPSULE, GELATIN COATED ORAL at 11:31

## 2022-03-11 RX ADMIN — VANCOMYCIN HYDROCHLORIDE 125 MG: 10 INJECTION, POWDER, LYOPHILIZED, FOR SOLUTION INTRAVENOUS at 12:54

## 2022-03-11 RX ADMIN — ACYCLOVIR SODIUM 350 MG: 50 INJECTION, SOLUTION INTRAVENOUS at 17:42

## 2022-03-11 RX ADMIN — APIXABAN 5 MG: 5 TABLET, FILM COATED ORAL at 11:31

## 2022-03-11 RX ADMIN — SODIUM CHLORIDE: 9 INJECTION, SOLUTION INTRAVENOUS at 11:22

## 2022-03-11 RX ADMIN — ALLOPURINOL 100 MG: 100 TABLET ORAL at 11:32

## 2022-03-11 RX ADMIN — MYCOPHENOLATE MOFETIL 500 MG: 250 CAPSULE ORAL at 11:32

## 2022-03-11 ASSESSMENT — PAIN SCALES - GENERAL
PAINLEVEL_OUTOF10: 0

## 2022-03-11 NOTE — PROGRESS NOTES
Subjective:     Lethargic/sleepy, unable to provide HPI/systemic review. Data gathered from reviewing the medical record and discussion with ancillary staff. PMH,Social Hx and Family Hx : Reviewed; no changes from initial note      Review of Systems    Unable to assess    Daily progress note:  3-11  More awake and conversant today, stable on 3 LNC, hypotensive, ammonia/tox screen negative, ct head NAD. Getting speech evaluated now    Objective:   Data Review:  Vital Signs: BP 81/68   Pulse 80   Temp 97.9 °F (36.6 °C) (Infrared)   Resp 18   Ht 5' 2\" (1.575 m)   Wt 220 lb (99.8 kg)   SpO2 91%   BMI 40.24 kg/m²     24 Hour I&O Review:   Intake/Output Summary (Last 24 hours) at 3/11/2022 0959  Last data filed at 3/11/2022 3659  Gross per 24 hour   Intake 1383.69 ml   Output 175 ml   Net 1208.69 ml       Physical Exam   Level of Alertness:   Confused  Orientation:  Oriented to 2  CONSTITUTIONAL:   appears stated age  Throat: clear, no ulcerations or exudate  Ears:clear, no discharge  Neck:supple no LAP, or masses  LUNGS:  Basilar crackles, no wheez  CARDIOVASCULAR: irregular NL S1 and S2 and no M/R/G  ABDOMEN:  normal bowel sounds, distended and non-tender to palpation  EXT: 1+ edema, no calf tenderness. Pulses are present bilaterally.   NEUROLOGIC:  nonfocal grossly  SKIN:  Warm and dry  Psych: FLAT AFFECT      Continuous Infusions:   sodium chloride      sodium chloride      sodium chloride 100 mL/hr at 03/11/22 6811         Current Medications: Current Facility-Administered Medications: lidocaine 1 % injection 5 mL, 5 mL, IntraDERmal, Once  sodium chloride flush 0.9 % injection 5-40 mL, 5-40 mL, IntraVENous, 2 times per day  sodium chloride flush 0.9 % injection 5-40 mL, 5-40 mL, IntraVENous, PRN  0.9 % sodium chloride infusion, 25 mL, IntraVENous, PRN  heparin flush 100 UNIT/ML injection 100 Units, 1 mL, IntraVENous, 2 times per day  heparin flush 100 UNIT/ML injection 100 Units, 1 mL, IntraCATHeter, PRN  0.9 % sodium chloride infusion, , IntraVENous, Once  piperacillin-tazobactam (ZOSYN) 3,375 mg in dextrose 5 % 50 mL IVPB extended infusion (mini-bag), 3,375 mg, IntraVENous, Q8H  vancomycin (VANCOCIN) oral solution 125 mg, 125 mg, Oral, 4 times per day  acyclovir (ZOVIRAX) 350 mg in dextrose 5 % 50 mL IVPB, 5 mg/kg (Adjusted), IntraVENous, Q8H  0.9 % sodium chloride infusion, , IntraVENous, Continuous  apixaban (ELIQUIS) tablet 5 mg, 5 mg, Oral, BID  desvenlafaxine succinate (PRISTIQ) extended release tablet 50 mg, 50 mg, Oral, Daily  ondansetron (ZOFRAN-ODT) disintegrating tablet 4 mg, 4 mg, Oral, Q8H PRN  atorvastatin (LIPITOR) tablet 10 mg, 10 mg, Oral, Nightly  cloZAPine (CLOZARIL) tablet 50 mg, 50 mg, Oral, Nightly  [Held by provider] lisinopril (PRINIVIL;ZESTRIL) tablet 5 mg, 5 mg, Oral, Daily  levothyroxine (SYNTHROID) tablet 50 mcg, 50 mcg, Oral, Daily  pantoprazole (PROTONIX) tablet 40 mg, 40 mg, Oral, QAM AC  docusate sodium (COLACE) capsule 100 mg, 100 mg, Oral, BID  cycloSPORINE (SANDIMMUNE) capsule 100 mg, 100 mg, Oral, BID  mycophenolate (CELLCEPT) capsule 500 mg, 500 mg, Oral, BID  metoprolol succinate (TOPROL XL) extended release tablet 50 mg, 50 mg, Oral, Daily  predniSONE (DELTASONE) tablet 5 mg, 5 mg, Oral, Daily  allopurinol (ZYLOPRIM) tablet 100 mg, 100 mg, Oral, Daily  folic acid (FOLVITE) tablet 1 mg, 1 mg, Oral, Daily    Ventilator Settings:Vent Information  SpO2: 91 %   CBC:  Recent Labs     03/09/22  1032 03/10/22  1329   WBC 24.7* 20.0*   RBC 3.42* 2.67*   HGB 10.8* 8.6*   HCT 37.4 28.7*    145   .4* 107.5*   MCH 31.6 32.2   MCHC 28.9* 30.0*   RDW 16.3* 16.1*      BMP:  Recent Labs     03/09/22  1032 03/10/22  0549    143   K 4.7 4.9    108*   CO2 22 21*   BUN 30* 38*   CREATININE 1.6* 1.7*   CALCIUM 10.1 9.9   GLUCOSE 96 75      ABG:  Lab Results   Component Value Date    PH 7.380 03/09/2022    PCO2 45.5 03/09/2022    PO2 92.9 03/09/2022    HCO3 26.3 03/09/2022    O2SAT 96.3 03/09/2022       Cultures:  Recent Labs     03/09/22  1032   BC 24 Hours no growth     Recent Labs     03/09/22  1032   BLOODCULT2 24 Hours no growth     No results for input(s): CULTRESP in the last 72 hours. Radiology Review:  Pertinent images / reports were reviewed as a part of this visit. CT HEAD WO CONTRAST   Final Result   No acute intracranial abnormality or hemorrhage. Bilateral mastoiditis and right sphenoid sinusitis. XR SHOULDER LEFT (MIN 2 VIEWS)   Final Result   No fracture. Increased separation of the humeral head from the osseous   glenoid and AC joint, which could represent subluxation. Otherwise, a large   synovial effusion, hematoma, or mass cannot be excluded. Clinical   correlation is recommended. CT scan of the left shoulder can be performed   for further evaluation. Cardiomegaly. XR HUMERUS LEFT (MIN 2 VIEWS)   Final Result   No fracture or dislocation. Increased separation of the humeral head from   the osseous glenoid and AC joint, which could represent subluxation. A large   synovial effusion, hematoma, or mass cannot be excluded. CT scan of the left   shoulder can be considered for further evaluation. XR CHEST PORTABLE   Final Result   1. Right perihilar discoid atelectasis. 2. There are no findings of failure or pneumonia.          IR INTERVENTIONAL RADIOLOGY PROCEDURE REQUEST    (Results Pending)       Other Diagnostic Testing:      Assessment:   Bibasilar atelectasis     Suspect aspiration of stomach contents/asp pneumonia     Hypoxemia on NC 2/2 above     CECILY sp kidney transplant     Metabolic encephalopathy     HX DVT SP IVC, on AC now    Hypotension, suspect in the setting of sepsis    Plan:     o2 to keep sat> 90%, wean as tolerated     Cont abx, FU CX, monitor hemodynamics, trend lactate     Monitor I/O, RFP closely, judicious fluids for now     Aspiration precautions, speech eval     AC, monitor for signs of bleeding     Avoid sedatives/hypnotics, monitor mental status      Discussed with RN re management        Electronically signed by Bridgette Miller MD on 3/11/2022 at 9:59 AM

## 2022-03-11 NOTE — ACP (ADVANCE CARE PLANNING)
Advance Care Planning   Healthcare Decision Maker:    Primary Decision Maker: Mayur Cheema Michell Sayda - [phone]    Secondary Decision Maker: Laine Thomas - [place] - [phone]      Electronically signed by Hao Buchanan RN-BC on 3/11/2022 at 2:37 PM

## 2022-03-11 NOTE — H&P
[address]                              HISTORY AND PHYSICAL    PATIENT NAME: Jair Johnston              :        1955  MED REC NO:   [id number]                            ROOM:       7205  ACCOUNT NO:   [de-identified]                           ADMIT DATE: 2022  PROVIDER:     Basia Dean MD    CHIEF COMPLAINT:  Altered mental status. HISTORY OF PRESENT ILLNESS:  This is a 81-year-old white lady with  significant past medical history of chronic renal failure, status post  renal transplant in [facility] in , bipolar disorder,  hypertension, hyperlipidemia, hypothyroidism. The patient was in rehab  facility. She was recently discharged from the hospital on 2022  because of aspiration pneumonia and left lower extremity DVT. The  patient was brought because according to the nursing home, she had some  altered mental status. In the emergency room, her blood work showed  leukocytosis with a white count of 24,000, which was within normal upon  discharge on . She did have a slight confusion which she has been  having on and off but also during her stay in the emergency room, her  oxygenation dropped to 85-87% on room air. The patient was not on home  oxygen. She was treated last admission for aspiration pneumonia. She  was supposed to be finishing Augmentin course for 10 days since . The patient was then readmitted to the hospital for evaluation of her  leukocytosis and hypoxia. Chest x-ray did not reveal any acute  abnormality. PAST MEDICAL HISTORY:  1. Chronic renal failure, status post renal transplant in [facility] in . 2.  Hypertension. 3.  Bipolar disorder. 4.  Hyperlipidemia. 5.  Obesity. 6.  Hypothyroidism. 7.  Recent left mid and distal femoral vein and popliteal vein DVT,  nonocclusive. 8.  Tachycardia.   9.  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 PAST SURGICAL HISTORY:  1.  . 2.  Dialysis catheter in . 3.  Kidney transplant in 2015, [facility].  4.  Parathyroidectomy. 5.  IVC filter placement in 2022 due to DVT and possible GI bleed. ALLERGIES:  She is allergic to [facility] and SULFA. SOCIAL HISTORY:  She quit smoking about 7 to 8 years ago. She used to  smoke 20 packs a year for many years. She does not drink any alcohol or  use any drugs. She lives by herself at home until recently for  recurrent hospitalization. She has been in a nursing home. FAMILY HISTORY:  Her dad had dementia, diabetes. Her mom had diabetes. MEDICATIONS:  She is on Protonix 40 mg daily, Augmentin 1 tablet twice a  day, supposed to be done on 2022, Eliquis 5 mg twice a day,  Lipitor 10 mg daily, Lyrica 75 mg daily, DuoNeb nebulizer q.6 p.r.n.,  Zofran 4 mg q.8h. as needed, allopurinol 100 mg daily, Toprol 50 mg  daily, Pristiq 50 mg daily, Clozaril 50 mg at bedtime, cyclosporine 100  mg twice a day, prednisone 5 mg daily, Colace 100 mg twice a day,  Cellcept 1000 twice a day, Synthroid 50 mcg daily, Pulmicort nebulizer  500 mcg twice a day. PHYSICAL EXAMINATION:  GENERAL:  The patient is lying in bed. The patient is oriented to time  and person. VITAL SIGNS:  Blood pressure 124/59, pulse 77, respirations 18,  temperature 97.1, oxygenation is 98% on 3 liters. HEENT:  Pupils equal, regular, reactive to light. LUNGS:  Diminished in the bases. HEART:  Regular rate and rhythm. Soft systolic murmur over base and  apex. ABDOMEN:  Distended, obese. Positive bowel sounds. EXTREMITIES:  Left lower extremity edema +1 to 2. NEUROLOGIC:  Slightly confused and delirious, but answers appropriately. Disoriented to place, and she is unable to move her left arm except  minimally, seems mainly musculoskeletal in origin. She had that problem  since last two admissions. ASSESSMENT AND PLAN:  1. Leukocytosis. The patient has been getting treatment for aspiration  pneumonia. There was no progression of the disease on the chest x-ray. We will get blood cultures x2. Urine did not show any acute  abnormality. We will consult Infectious Disease. We will place her on  Zosyn 3.375 q.12h.  2.  Hypoxia, which is probably secondary to her recent aspiration  pneumonia. She will probably need home O2. We will set her up for that  and also continue her Zosyn for now. Also probably due to her COPD  contributing to her decreased oxygenation, we will consult Pulmonology. 3.  Left femoral and popliteal vein thrombosis. We will continue her  Eliquis. 4.  Acute-on-chronic renal failure. Her baseline now seems to be 1.7. She got her baseline used to be 1.2 to 1.5. She has a renal transplant  in 2015 at [facility].  Monitor her renal function closely. 5.  History of renal transplant in immunocompromised host, on  prednisone, CellCept and cyclosporine. 6.  Anemia of chronic disease. We will check her iron studies, might  need Epogen for her anemia. 7.  Hypertension. She was slightly hypotensive this morning. She is on  Toprol. We will hold if systolic blood pressure is less than 100.  8. Hyperlipidemia. We will resume her simvastatin. 9.  Status post IVC filter placement last admission on 02/2022 for  possible GI bleed and because of her DVT.         Machelle Fox MD    D: 03/10/2022 18:57:11       T: 03/10/2022 19:06:51     [email or web address]  Job#: [id number]     Doc#: [id number]    CC:

## 2022-03-11 NOTE — CARE COORDINATION
Copy of IMM letter is being emailed to son Luba [email or web address] @ [email or web address].  Electronically signed by Adeel Grijalva on 3/11/2022 at 12:02 PM

## 2022-03-11 NOTE — PROGRESS NOTES
SPEECH/LANGUAGE PATHOLOGY  CLINICAL ASSESSMENT OF SWALLOWING FUNCTION   and PLAN OF CARE    PATIENT NAME:  Ortega Grijalva  (female)     MRN:  [id number]    :  1955  (73 y.o.)  STATUS:  Inpatient: Room 0415/0415-01    TODAY'S DATE:  3/11/2022  REFERRING PROVIDER:     SLP eval and treat Start: 22 1000, End: 22 1000, ONE TIME, Standing Count: 1 Occurrences, R    Susannah Ramirez MD  REASON FOR REFERRAL: dysphagia altered mental status    EVALUATING THERAPIST: CHAPARRITA Velazquez                 RESULTS:    DYSPHAGIA DIAGNOSIS:   Clinical indicators of functional swallow for age/premorbid functioning      DIET RECOMMENDATIONS:  Soft and bite size consistency solids (IDDSI level 6) with  thin liquids (IDDSI level 0)     FEEDING RECOMMENDATIONS:     Assistance level:  Stand by assistance is needed during all oral intake      Compensatory strategies recommended: Small bites/sips and Alternate solids and liquids      Discussed recommendations with nursing and/or faxed report to referring provider: Yes    SPEECH THERAPY  PLAN OF CARE   The dysphagia POC is established based on physician order, dysphagia diagnosis and results of clinical assessment     Skilled SLP intervention for dysphagia management up to 5x per week until goals met, pt plateaus in function and/or discharged from hospital    Conditions Requiring Skilled Therapeutic Intervention for dysphagia:    Patient is performing below functional baseline d/t  current acute condition, Multiple diagnoses, multiple medications, and increased dependency upon caregivers.     Specific dysphagia interventions to include:     Trials of upgraded diet/liquid     Specific instructions for next treatment:  therapeutic po trial to determine safety of advanced diet textures and consistencies  Patient Treatment Goals:    Short Term Goals:  Pt will implement identified compensatory swallowing strategies on 90% of opportunities or greater to improve airway protection and swallow function. Pt will complete PO trials of upgraded diet textures with SLP only to determine the least restrictive PO diet to maintain adequate nutrition/hydration with no more than 1 overt s/s of pen/asp. Long Term Goals:   Pt will improve oropharyngeal swallow function to ensure airway protection during PO intake to maintain adequate nutrition/hydration and decrease signs/symptoms of aspiration to less than 1 x/day. Patient/family Goal:    To be able to eat regular foods and drink regular liquids    Plan of care discussed with Patient   The Patient did not demonstrate complete understanding of the diagnosis, prognosis and plan of care     Rehabilitation Potential/Prognosis: good                    ADMITTING DIAGNOSIS: Confusion [R41.0]  Acute respiratory failure with hypoxia ([address] [J96.01]  Acute respiratory failure with hypoxemia ([address] [J96.01]    VISIT DIAGNOSIS:   Visit Diagnoses       Codes    Confusion     R41.0           PATIENT REPORT/COMPLAINT: denies difficulty swallowing  RN cleared patient for participation in assessment     yes     PRIOR LEVEL OF SWALLOW FUNCTION:    PAST HISTORY OF DYSPHAGIA?: yes    Home diet: Soft and bite size consistency solids (IDDSI level 6) with  thin liquids (IDDSI level 0)  Current Diet Order:  ADULT DIET; Dysphagia - Soft and Bite Sized    PROCEDURE:  Consistencies Administered During the Evaluation   Liquids: thin liquid   Solids:  pureed foods and soft solid foods      Method of Intake:   cup, straw, spoon  Self fed      Position:   Seated, upright    CLINICAL ASSESSMENT:  Oral Stage:       Pt is able to achieve adequate cohesive bolus prep as evidenced by satisfactory mastication patterns, timely A-P bolus propulsion, and minimal oral residuals. Pharyngeal Stage:    No signs of aspiration were noted during this evaluation however, silent aspiration cannot be ruled out at bedside.   If silent aspiration is suspected, a Videofluoroscopic Study of Swallowing (MBS) is recommended and requires a physician order. Cognition:   Within functional limits for this exam    Oral Peripheral Examination   Generalized oral weakness    Current Respiratory Status    3 liters nasal cannula     Parameters of Speech Production  Respiration:  Adequate for speech production  Quality:   Within functional limits  Intensity: Within functional limits    Volitional Swallow: present     Volitional Cough:   present     Pain: No pain reported. EDUCATION:   The Speech Language Pathologist (SLP) completed education regarding results of evaluation and that intervention is warranted at this time. Learner: Patient  Education: Reviewed results and recommendations of this evaluation  Evaluation of Education:  Needs further instruction    This plan may be re-evaluated and revised as warranted. Evaluation Time includes thorough review of current medical information, gathering information on past medical history/social history and prior level of function, completion of standardized testing/informal observation of tasks, assessment of data and education on plan of care and goals. [x]The admitting diagnosis and active problem list, have been reviewed prior to initiation of this evaluation.         ACTIVE PROBLEM LIST:   Patient Active Problem List   Diagnosis    Peripheral vascular disease ([address]    Depression    Clotting disorder (HCC)    Abnormal EKG    Cancer ([address]    Over weight    Kidney transplanted    ESRD (end stage renal disease) ([address]    Non morbid obesity due to excess calories    Hypertension    Leg swelling    Lymphedema of both lower extremities    Acute gout involving toe of right foot    Sepsis secondary to UTI ([address]    Acute kidney injury superimposed on CKD ([address]    Thyroid disease    Acute on chronic combined systolic (congestive) and diastolic (congestive) heart failure (HCC)    Sepsis ([address]    Acute renal failure ([address]    CECILY [facility]acute [facility] injury) [facility]    Ischemic bowel disease ([facility]    Altered mental status    Coma ([facility]    Acute respiratory failure with hypoxia (HCC)    Hypoxia    Acute metabolic encephalopathy    Acute deep vein thrombosis (DVT) of femoral vein of left lower extremity (HCC)    Anemia    Positive blood culture    Difficult intravenous access    Elevated troponin    Paroxysmal atrial fibrillation (HCC)    Hypotension    LBBB (left bundle branch block)    History of DVT (deep vein thrombosis)    Chronic anticoagulation    Acute respiratory failure with hypoxemia [facility])         CPT code:  32440  bedside swallow carolyn Davis MSCCC/SLP  Speech Language Pathologist  [id number]

## 2022-03-11 NOTE — PROGRESS NOTES
Physical Therapy    0960/4449-90    Patient unavailable for physical therapy treatment due to unable to arouse patient in any fashion. Madonna Lund.  Aida  [facility]  LIC # [id number]

## 2022-03-11 NOTE — PROGRESS NOTES
Patient is seen in follow-up for non-ST elevation MI    Subjective:     Ms. Jitendra Caputo feels better today, still feeling tired  Sitting up in bed no apparent distress    ROS:  CONSTITUTIONAL:  negative for  fevers, chills  HEENT:  negative for earaches, nasal congestion and epistaxis  RESPIRATORY:  negative for  dry cough, cough with sputum,wheezing and hemoptysis  GASTROINTESTINAL:  negative for nausea, vomiting  MUSCULOSKELETAL:  negative for  myalgias, arthralgias  NEUROLOGICAL:  negative for visual disturbance, dysphagia    Medication side effects:none    Scheduled Meds:   lidocaine  5 mL IntraDERmal Once    sodium chloride flush  5-40 mL IntraVENous 2 times per day    heparin flush  1 mL IntraVENous 2 times per day    metoprolol tartrate  12.5 mg Oral BID    Fidaxomicin  200 mg Oral BID    levofloxacin  750 mg IntraVENous Q48H    acyclovir  5 mg/kg (Adjusted) IntraVENous Q8H    apixaban  5 mg Oral BID    desvenlafaxine succinate  50 mg Oral Daily    atorvastatin  10 mg Oral Nightly    cloZAPine  50 mg Oral Nightly    [Held by provider] lisinopril  5 mg Oral Daily    levothyroxine  50 mcg Oral Daily    pantoprazole  40 mg Oral QAM AC    docusate sodium  100 mg Oral BID    cycloSPORINE  100 mg Oral BID    mycophenolate  500 mg Oral BID    predniSONE  5 mg Oral Daily    allopurinol  100 mg Oral Daily    folic acid  1 mg Oral Daily     Continuous Infusions:   sodium chloride      sodium chloride 100 mL/hr at 03/11/22 0612     PRN Meds:sodium chloride flush, sodium chloride, heparin flush, ondansetron    I/O last 3 completed shifts: In: 1383.7 [I.V.:976.9; IV Piggyback:406.8]  Out: 175 [Urine:175]  I/O this shift:   In: 76 [P.O.:75]  Out: -       Objective:      Physical Exam:   BP (!) 91/48   Pulse 84   Temp 98 °F (36.7 °C) (Oral)   Resp 18   Ht 5' 2\" (1.575 m)   Wt 220 lb (99.8 kg)   SpO2 98%   BMI 40.24 kg/m²   CONSTITUTIONAL:  awake, alert, cooperative, no apparent distress, and appears stated age  HEAD:  normocepalic, without obvious abnormality, atraumatic  NECK:  Supple, symmetrical, trachea midline, no adenopathy, thyroid symmetric, not enlarged and no tenderness, skin normal  LUNGS:  No increased work of breathing, No accessory muscle use or intercostal retractions, good air exchange, clear to auscultation bilaterally, no crackles or wheezing  CARDIOVASCULAR:  Normal apical impulse, regular rate and rhythm, normal S1 and S2, no S3 or S4, and no murmur noted, no edema, no JVD, no carotid bruit. ABDOMEN:  Soft, nontender, no masses, no hepatomegaly, no splenomegaly, BS+  MUSCULOSKELETAL:  No clubbing no cyanosis. there is no redness, warmth, or swelling of the joints  full range of motion noted  NEUROLOGIC:  Alert, awake,oriented x3  SKIN:  no bruising or bleeding, normal skin color, texture, turgor and no redness, warmth, or swelling      Cardiographics  I personally reviewed the telemetry monitor strips with the following interpretation: Sinus rhythm    Echocardiogram: 2/28/2022: Summary   Technically difficult study. No definate evidence of vegetation consistent with endocarditis. Normal LV segmental wall motion. Ejection fraction is visually estimated at 60 to 65%. Normal right ventricular size and function. Imaging  IR FLUORO GUIDED CVA DEVICE PLMT/REPLACE/REMOVAL   Final Result   Successful placement of a dual lumen power PICC line. The tip of the PICC   line catheter was placed within the distal right subclavian vein. Tony Chu IMPRESSION:   Successful ultrasound and fluoroscopy guided PICC placement         IR ULTRASOUND GUIDANCE VASCULAR ACCESS   Final Result   Successful placement of a dual lumen power PICC line. The tip of the PICC   line catheter was placed within the distal right subclavian vein. Tony Chu       IMPRESSION:   Successful ultrasound and fluoroscopy guided PICC placement         CT HEAD WO CONTRAST   Final Result   No acute intracranial abnormality or hemorrhage. Bilateral mastoiditis and right sphenoid sinusitis. XR SHOULDER LEFT (MIN 2 VIEWS)   Final Result   No fracture. Increased separation of the humeral head from the osseous   glenoid and AC joint, which could represent subluxation. Otherwise, a large   synovial effusion, hematoma, or mass cannot be excluded. Clinical   correlation is recommended. CT scan of the left shoulder can be performed   for further evaluation. Cardiomegaly. XR HUMERUS LEFT (MIN 2 VIEWS)   Final Result   No fracture or dislocation. Increased separation of the humeral head from   the osseous glenoid and AC joint, which could represent subluxation. A large   synovial effusion, hematoma, or mass cannot be excluded. CT scan of the left   shoulder can be considered for further evaluation. XR CHEST PORTABLE   Final Result   1. Right perihilar discoid atelectasis. 2. There are no findings of failure or pneumonia. Lab Review   Lab Results   Component Value Date     03/11/2022    K 4.2 03/11/2022    K 4.7 03/09/2022     03/11/2022    CO2 23 03/11/2022    BUN 43 03/11/2022    CREATININE 2.2 03/11/2022    GLUCOSE 75 03/11/2022    CALCIUM 9.3 03/11/2022     Lab Results   Component Value Date    WBC 13.0 03/11/2022    HGB 8.5 03/11/2022    HCT 30.5 03/11/2022    .3 03/11/2022     03/11/2022     I have personally reviewed the laboratory, cardiac diagnostic and radiographic testing as outlined above:    Assessment:     1. Elevated troponin: Flat pattern, secondary to comorbid conditions  2. Atrial fibrillation: Paroxysmal, now in sinus rhythm, no chronic anticoagulation with Eliquis  3. Hypotension: Secondary to sepsis and dehydration? Stable on IV hydration  4. Abnormal EKG with chronic left bundle branch block  5. S/p kidney transplant  6. Acute kidney injury  7. History of DVT s/p IVC filter  8. Altered mental status  9.  Chronic anticoagulation  10. Immunosuppression    Recommendations:     1. Continue current treatment  2. Basic metabolic panel and CBC in a.m.  3.  Further cardiac recommendations will be forthcoming pending her clinical course and diagnostic test findings    Discussed with patient  Electronically signed by Willy Libman, MD on 3/11/2022 at 4:58 PM  NOTE: This report was transcribed using voice recognition software.  Every effort was made to ensure accuracy; however, inadvertent computerized transcription errors may be present

## 2022-03-11 NOTE — PROGRESS NOTES
Speech Language Pathology      NAME:  Azalea Voss  :  1955  DATE: 3/11/2022  ROOM:  [facility]-    Patient seen for dysphagia therapy 15 minutes. Patient needs verbal cues to slow rate of intake especially with thin liquids. Patient with poor follow through at times with directions during PO intake.   Will continue POC    Confusion [R41.0]  Acute respiratory failure with hypoxia ([address] [J96.01]  Acute respiratory failure with hypoxemia ([address] [J96.01]    [id number]  dysphagia mirella Quigley MSCCC/SLP  Speech Language Pathologist  [id number]

## 2022-03-11 NOTE — PROGRESS NOTES
Speech Language Pathology      NAME:  Jeanette Garcia  :  1955  DATE: 3/10/2022  ROOM:  9570/0189-86  SLP swallowing-dysphagia evaluation and treatment Start: 03/10/22 1315, End: 03/10/22 1315, ONE TIME, Standing Count: 1 Occurrences, Karishma Conn MD    Order received. Chart reviewed. Pt unavailable at this time due to:  Not able to rouse patient enough for safe PO intake. Continue NPO with ongoing assessment by SLP to determine if diet can be restarted.     [] HOLD per RN, Pt   [] Off unit for testing/ procedure    [] With medical staff   [] Declined intervention  [x] Sleeping/ Lethargic   [] Other:     Will re-attempt later this date as able. Thank you.        Confusion [R41.0]  Acute respiratory failure with hypoxia ([facility] [J96.01]        Smita Lung MSCCC/SLP  Speech Language Pathologist  [id number]

## 2022-03-11 NOTE — PROGRESS NOTES
[facility]     [address] [place] [address] [facility]ARE [facility]ENTER, [address]  Phone [phone]   [phone]      Admit Date: 3/9/2022 10:02 AM  Pt Name: Lorenzo Palmer  MRN: [id number]  : 1955  Reason for Consult:    Chief Complaint   Patient presents with    Altered Mental Status     from NH     Requesting Physician:  John Barber MD  PCP: Burgess Kodak MD  History Obtained From:  patient, chart   ID consulted for Confusion [R41.0]  Acute respiratory failure with hypoxia ([address] [J96.01]  Acute respiratory failure with hypoxemia ([address] [J96.01]  on hospital day 0  CHIEF COMPLAINT       Chief Complaint   Patient presents with    Altered Mental Status     from NH     HISTORYOF PRESENT ILLNESS   Lorenzo Palmer is a 73 y.o. female who presents with   has a past medical history of Abnormal EKG, Acute gout involving toe of right foot, Acute on chronic combined systolic (congestive) and diastolic (congestive) heart failure ([address] Cancer ([address] Cerebrovascular disease, Clotting disorder ([address] Depression, Hemodialysis patient ([address] History of blood transfusion, Hyperlipidemia, Hypertension, Hypothyroidism, Leg swelling, Lymphedema of both lower extremities, Peripheral vascular disease ([address] Renal failure, Thrombophlebitis, and Thyroid disease. HPI  Know to ID   · Last seen on 3/2/2022-d/c on Augmentin cover poss aspiration and colitis  Pt came in due to altered mental status. Wbc24.7 hgb10.8 yjh177 cr1.6 TMAX99.3  PT WAS SEEN AND EXAMINED WITH NURSES PRESENT  PT IS NOT AROUSABLE UNABLE TO OBTAIN HISTORY    PT HAS SOFT STOOLS SOME ODOR  SACRAL AREA VIEWED HAS SMALL DTI AREA  CXRY 1. Right perihilar discoid atelectasis. 2. There are no findings of failure or pneumonia.    LEFT SHOULDER No fracture.  Increased separation of the humeral head from the osseous   glenoid and AC joint, which could represent subluxation.  Otherwise, a large   synovial effusion, hematoma, or mass cannot be excluded.  Clinical   correlation is recommended.  CT scan of the left shoulder can be performed   for further evaluation.  Cardiomegaly. LEFT HUMERUS No fracture or dislocation.  Increased separation of the humeral head from   the osseous glenoid and AC joint, which could represent subluxation.  A large   synovial effusion, hematoma, or mass cannot be excluded.  CT scan of the left   shoulder can be considered for further evaluation. piperacillin-tazobactam (ZOSYN) 3,375 mg in dextrose 5 % 50 mL IVPB extended infusion (mini-bag), Q8H  vancomycin (VANCOCIN) oral solution 125 mg, 4 times per day  cycloSPORINE (SANDIMMUNE) capsule 100 mg, BID  mycophenolate (CELLCEPT) capsule 500 mg, BID  metoprolol succinate (TOPROL XL) extended release tablet 50 mg, Daily  predniSONE (DELTASONE) tablet 5 mg, Daily     LAST BLOOD CX 2/25 cons  2/28 CLEMENCIA  Summary   Technically difficult study. No definate evidence of vegetation consistent with endocarditis. Normal LV segmental wall motion. Ejection fraction is visually estimated at 60 to 65%. Normal right ventricular size and function. DOS  03/11/22  In bed open eyes briefly still lethargic  cdiff +  REVIEW OF SYSTEMS     CONSTITUTIONAL:   AS IN HPI     Medications Prior to Admission: pantoprazole (PROTONIX) 40 MG tablet, Take 1 tablet by mouth every morning (before breakfast)  amoxicillin-clavulanate (AUGMENTIN) 875-125 MG per tablet, Take 1 tablet by mouth every 12 hours for 10 days  apixaban (ELIQUIS) 5 MG TABS tablet, Take 5 mg by mouth 2 times daily Take in the morning and at bedtime for 3 months (started 2/19/2022)  atorvastatin (LIPITOR) 10 MG tablet, Take 10 mg by mouth daily  lisinopril (PRINIVIL;ZESTRIL) 5 MG tablet, take 1 tablet by mouth once daily  pregabalin (LYRICA) 75 MG capsule,   nystatin (MYCOSTATIN) 123210 UNIT/GM powder, Apply 3 times daily.   ipratropium-albuterol (DUONEB) 0.5-2.5 (3) MG/3ML SOLN nebulizer solution, Inhale 3 mLs into the lungs every 6 hours as needed for Shortness of Breath  ondansetron (ZOFRAN-ODT) 4 MG disintegrating tablet, Take 1 tablet by mouth every 8 hours as needed for Nausea or Vomiting  allopurinol (ZYLOPRIM) 100 MG tablet, Take 100 mg by mouth daily  Cyanocobalamin (B-12 COMPLIANCE INJECTION) 1000 MCG/ML KIT, Inject as directed monthly  metoprolol succinate (TOPROL XL) 50 MG extended release tablet, take 1 tablet by mouth once daily  desvenlafaxine succinate (PRISTIQ) 50 MG TB24 extended release tablet, Take 1 tablet by mouth every evening  cloZAPine (CLOZARIL) 50 MG tablet, Take 50 mg by mouth nightly  cycloSPORINE (SANDIMMUNE) 100 MG capsule, Take 100 mg by mouth 2 times daily  predniSONE (DELTASONE) 5 MG tablet, Take 5 mg by mouth daily  folic acid (FOLVITE) 1 MG tablet, Take 1 mg by mouth daily  docusate sodium (COLACE) 100 MG capsule, Take 100 mg by mouth 2 times daily  mycophenolate (CELLCEPT) 500 MG tablet, Take 1,000 mg by mouth 2 times daily  levothyroxine (SYNTHROID) 50 MCG tablet, Take 50 mcg by mouth daily.     budesonide (PULMICORT) 0.5 MG/2ML nebulizer suspension, Take 2 mLs by nebulization 2 times daily for 10 days  CURRENT MEDICATIONS     Current Facility-Administered Medications:     lidocaine 1 % injection 5 mL, 5 mL, IntraDERmal, Once, Pankaj Giraldo MD    sodium chloride flush 0.9 % injection 5-40 mL, 5-40 mL, IntraVENous, 2 times per day, Pankaj Giraldo MD    sodium chloride flush 0.9 % injection 5-40 mL, 5-40 mL, IntraVENous, PRN, Jez Ramirez MD    0.9 % sodium chloride infusion, 25 mL, IntraVENous, PRN, Jez Ramirez MD    heparin flush 100 UNIT/ML injection 100 Units, 1 mL, IntraVENous, 2 times per day, Pankaj Giraldo MD    heparin flush 100 UNIT/ML injection 100 Units, 1 mL, IntraCATHeter, PRN, Jez Ramirez MD    metoprolol tartrate (LOPRESSOR) tablet 12.5 mg, 12.5 mg, Oral, BID, Helio Garibay MD    piperacillin-tazobactam (ZOSYN) 3,375 mg in dextrose 5 % 50 mL IVPB extended infusion (mini-bag), 3,375 mg, IntraVENous, Q8H, Susannah Ramirez MD, Last Rate: 12.5 mL/hr at 03/11/22 1131, 3,375 mg at 03/11/22 1131    vancomycin (VANCOCIN) oral solution 125 mg, 125 mg, Oral, 4 times per day, Magalis Elizabeth MD, 125 mg at 03/11/22 1254    acyclovir (ZOVIRAX) 350 mg in dextrose 5 % 50 mL IVPB, 5 mg/kg (Adjusted), IntraVENous, Q8H, Magalis Elizabeth MD, Stopped at 03/11/22 0750    0.9 % sodium chloride infusion, , IntraVENous, Continuous, Teresa Cohn MD, Last Rate: 100 mL/hr at 03/11/22 0612, Rate Verify at 03/11/22 0612    apixaban (ELIQUIS) tablet 5 mg, 5 mg, Oral, BID, Susannah Ramirez MD, 5 mg at 03/11/22 1131    desvenlafaxine succinate (PRISTIQ) extended release tablet 50 mg, 50 mg, Oral, Daily, Susannah Ramirez MD, 50 mg at 03/11/22 1132    ondansetron (ZOFRAN-ODT) disintegrating tablet 4 mg, 4 mg, Oral, Q8H PRN, Beulah Ramirez MD    atorvastatin (LIPITOR) tablet 10 mg, 10 mg, Oral, Nightly, Susannah Ramirez MD, 10 mg at 03/09/22 2232    cloZAPine (CLOZARIL) tablet 50 mg, 50 mg, Oral, Nightly, Susannah Ramirez MD, 50 mg at 03/09/22 2232    [Held by provider] lisinopril (PRINIVIL;ZESTRIL) tablet 5 mg, 5 mg, Oral, Daily, Susannah Ramirez MD, 5 mg at 03/10/22 5535    levothyroxine (SYNTHROID) tablet 50 mcg, 50 mcg, Oral, Daily, Beulah Ramirez MD, 50 mcg at 03/10/22 0550    pantoprazole (PROTONIX) tablet 40 mg, 40 mg, Oral, QAM AC, Susannah Ramirez MD, 40 mg at 03/10/22 0550    docusate sodium (COLACE) capsule 100 mg, 100 mg, Oral, BID, Susannah Ramirez MD, 100 mg at 03/10/22 0831    cycloSPORINE (SANDIMMUNE) capsule 100 mg, 100 mg, Oral, BID, Susannah Ramirez MD, 100 mg at 03/11/22 1131    mycophenolate (CELLCEPT) capsule 500 mg, 500 mg, Oral, BID, Susannah Ramirez MD, 500 mg at 03/11/22 1132    predniSONE (DELTASONE) tablet 5 mg, 5 mg, Oral, Daily, Susannah Ramirez MD, 5 mg at 03/11/22 1132    allopurinol (ZYLOPRIM) tablet 100 mg, 100 mg, Oral, Daily, Tori Niño MD, 100 mg at 83/44/07 6986    folic acid (FOLVITE) tablet 1 mg, 1 mg, Oral, Daily, Mumtaz Ramirez MD, 1 mg at 03/11/22 1132  ALLERGIES     Macrodantin [nitrofurantoin macrocrystal] and Sulfa antibiotics  Immunization History   Administered Date(s) Administered    COVID-19, Pfizer Purple top, DILUTE for use, 12+ yrs, 30mcg/0.3mL dose 01/28/2021, 02/18/2021, 08/18/2021      Internal Administration   First Dose COVID-19, Pfizer Purple top, DILUTE for use, 12+ yrs, 30mcg/0.3mL dose  01/28/2021   Second Dose COVID-19, Pfizer Purple top, DILUTE for use, 12+ yrs, 30mcg/0.3mL dose   02/18/2021       Last COVID Lab SARS-CoV-2 (no units)   Date Value   01/20/2021 Not Detected     SARS-CoV-2 Antibody, Total (no units)   Date Value   01/13/2022 Non-Reactive     SARS-CoV-2, PCR (no units)   Date Value   02/25/2022 Not Detected     SARS-CoV-2, NAAT (no units)   Date Value   03/02/2022 Not Detected            PAST MEDICAL HISTORY     Past Medical History:   Diagnosis Date    Abnormal EKG     left bundle branch block    Acute gout involving toe of right foot 09/03/2020    Acute on chronic combined systolic (congestive) and diastolic (congestive) heart failure (HCC)     Cancer (HCC)     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    Cerebrovascular disease     Clotting disorder ([address] 1985    thrombophlebitis after c section delivery    Depression     15 years followed by dr Cherrie Chicas Hemodialysis patient [facility])     History of blood transfusion     Hyperlipidemia     Hypertension     Hypothyroidism     Leg swelling 09/03/2020    Lymphedema of both lower extremities 09/03/2020    Peripheral vascular disease ([address]     Renal failure 11/2012    renal transplant    Thrombophlebitis     after c section delivery    Thyroid disease      SURGICAL HISTORY       Past Surgical History:   Procedure Laterality Date   [address]    one normal delivery    COLECTOMY N/A 1/15/2022 DIAGNOSTIC  LAPAROSCOPY LYSIS OF ADHESIONS performed by Tiffani Lewis MD at [address] [facility] [address] CATH LAB PROCEDURE  2006    normal coronaries and 1996 normal coronaries    DIALYSIS FISTULA CREATION  march and july 2012    phase one and two patient will start dialysis when needed   [address]    transfemoral venous bypass grafts    IR IVC FILTER PLACEMENT W IMAGING  2/26/2022    IR IVC FILTER PLACEMENT W IMAGING 2/26/2022 [facility] SPECIAL PROCEDURES    KIDNEY TRANSPLANT  2016    KIDNEY TRANSPLANT  2015    KIDNEY TRANSPLANT  2015    PARATHYROIDECTOMY  2006    left parathyroid  implant and parathyroidectomy    TRANSESOPHAGEAL ECHOCARDIOGRAM N/A 2/28/2022    TRANSESOPHAGEAL ECHOCARDIOGRAM WITH BUBBLE STUDY performed by Martínez Renteria MD at [facility]     ·  [address]:    03/11/22 0614 03/11/22 0615 03/11/22 0843 03/11/22 1100   BP: (!) 82/44 (!) 97/47 81/68 (!) 105/45   Pulse:   80 84   Resp:       Temp:   97.9 °F (36.6 °C)    TempSrc:   Infrared    SpO2:   91%    Weight:       Height:         Physical Exam   Constitutional/General:  NAD in bed   Head: NC/AT  Neck: Supple, full ROM,    Pulmonary: Lungs DEC  to auscultation bilaterally. ON O2   Cardiovascular:  Regular rate and rhythm  Abdomen: Soft, + BS.    distension. Nontender.     Extremities:  Warm and well perfused  Pulses:  Distal pulses intact  Skin: Warm and dry without rash    Neurologic:    UNABLE TO AROUSE  MERCHANT      DIAGNOSTIC RESULTS   RADIOLOGY:   ECHO Transesophageal    Result Date: 3/1/2022  Transesophageal Echocardiography Report (CLEMENCIA)   Demographics   Patient Name      Rod Pemberton       Gender              Female                    [address] [facility] Record    [id number]           Room Number         0500  Number   Account #         [de-identified]          Procedure Date      02/28/2022   Corporate ID                         Ordering Physician  Christen Montelongo MD Accession Number  [id number]         Referring Physician   Date of Birth     1955         Sonographer         Jack Bhatti                                                           [facility]   Age               73 year(s)         Interpreting        Gustavo Arzate MD                                       Physician                                        Any Other  Procedure Type of Study   CLEMENCIA procedure:Transesophageal Echo (CLEMENCIA), Color Flow Velocity Mapping. Procedure Date Date: 02/28/2022 Start: 11:00 AM Study Location: Portable Technical Quality: Adequate visualization Indications:R/O Endocarditis. Patient Status: Routine Height: 62 inches Weight: 220 pounds BSA: 1.99 m^2 BMI: 40.24 kg/m^2 BP: 138/79 mmHg CLEMENCIA Performed By: the attending and the sonographer  Type of Anesthesia: Moderate sedation   Findings   Left Ventricle  Normal LV segmental wall motion. Ejection fraction is visually estimated at 60 to 65%. Right Ventricle  Normal right ventricular size and function. Left Atrium  No evidence of thrombus within left atrium. Right Atrium  Normal right atrium size. Agitated saline injected for shunt evaluation shows no shunt. Mitral Valve  No vegetation. Mild mitral regurgitation is present. Tricuspid Valve  No vegetation. Aortic Valve  No vegetation. The aortic valve appears mildly sclerotic. Pulmonic Valve  No vegetation. Conclusions   Summary  Technically difficult study. No definate evidence of vegetation consistent with endocarditis. Normal LV segmental wall motion. Ejection fraction is visually estimated at 60 to 65%. Normal right ventricular size and function. Signature   ----------------------------------------------------------------  Electronically signed by Gustavo Arzate MD(Interpreting  physician) on 03/01/2022 09:11 AM  ----------------------------------------------------------------  [email or web address] [id number]    CT HEAD WO CONTRAST    Result Date: 2/25/2022  EXAMINATION: CT OF THE HEAD WITHOUT CONTRAST  2/25/2022 3:08 am TECHNIQUE: CT of the head was performed without the administration of intravenous contrast. Dose modulation, iterative reconstruction, and/or weight based adjustment of the mA/kV was utilized to reduce the radiation dose to as low as reasonably achievable. COMPARISON: Correlation with MRI dated 01/16/2022 HISTORY: ORDERING SYSTEM PROVIDED HISTORY: mental status change TECHNOLOGIST PROVIDED HISTORY: Has a \"code stroke\" or \"stroke alert\" been called? ->No Reason for exam:->mental status change Decision Support Exception - unselect if not a suspected or confirmed emergency medical condition->Emergency Medical Condition (MA) FINDINGS: BRAIN/VENTRICLES: There is no acute intracranial hemorrhage, mass effect or midline shift. No abnormal extra-axial fluid collection. The gray-white differentiation is maintained without evidence of an acute infarct. There is no evidence of hydrocephalus. Minimal cortical volume loss. Scattered vascular calcifications. ORBITS: The visualized portion of the orbits demonstrate no acute abnormality. SINUSES: Significant opacification of the left greater than right mastoid air cells similar to previous. Mild mucosal thickening in the left sphenoid sinus. This was also present previously. SOFT TISSUES/SKULL:  No acute abnormality of the visualized skull or soft tissues. No acute intracranial abnormality. MRI would be useful if symptoms persist. Inflammatory changes of left greater than right mastoid air cells and left sphenoid sinus similar to prior MRI.  RECOMMENDATIONS: Unavailable     NM LUNG VENT/PERFUSION (VQ)    Result Date: 2/25/2022  EXAMINATION: NUCLEAR MEDICINE VENTILATION PERFUSION SCAN. 2/25/2022 TECHNIQUE: 3.5 millicuries aerosolized Tc99m DTPA was administered via mask prior to planar imaging of the lungs in multiple projections. Then, 4.2 millicuries of Tc 50A MAA was administered intravenously prior to planar imaging of the lungs in similar projections. COMPARISON: Chest CT February 25, 2022 . HISTORY: ORDERING SYSTEM PROVIDED HISTORY: R/O PE TECHNOLOGIST PROVIDED HISTORY: Reason for exam:->R/O PE What reading provider will be dictating this exam?->CRC FINDINGS: PERFUSION: Distribution of radiotracer is homogeneous. No segmental defects identified. VENTILATION: Ventilation images are unremarkable. CHEST CT: Small infiltrate or atelectasis posterior right lung. Negative for pulmonary embolus. CT ABDOMEN PELVIS W IV CONTRAST Additional Contrast? None    Result Date: 2/25/2022  EXAMINATION: CT OF THE ABDOMEN AND PELVIS WITH CONTRAST 2/25/2022 3:08 am TECHNIQUE: CT of the abdomen and pelvis was performed with the administration of intravenous contrast. Multiplanar reformatted images are provided for review. Dose modulation, iterative reconstruction, and/or weight based adjustment of the mA/kV was utilized to reduce the radiation dose to as low as reasonably achievable. COMPARISON: 01/17/2022 HISTORY: ORDERING SYSTEM PROVIDED HISTORY: Abdominal distention, AMS TECHNOLOGIST PROVIDED HISTORY: Reason for exam:->Abdominal distention, AMS Additional Contrast?->None Decision Support Exception - unselect if not a suspected or confirmed emergency medical condition->Emergency Medical Condition (MA) FINDINGS: Many images are partially degraded by patient motion related artifact. Low-attenuation focus in the central aspect of the left lobe of the liver as well as a tiny low-attenuation focus in the caudate lobe. There is also a tiny low-attenuation focus in the extreme inferior aspect of the right lobe. These are difficult to definitively characterize due to their small size but likely represent small hepatic cysts. Gallbladder is unremarkable. Spleen is normal in size. No evidence of acute pancreatitis.   There is a E small exophytic cystic appearing lesion along the anterior aspect of the body of the pancreas measuring 7 Hounsfield units and 1.4 cm. This is unchanged dating back to 11/17/2021. This is also favored to be incidental but could be followed. No adrenal mass. No hydronephrosis. The native kidneys remains significantly atrophic and contain numerous cystic lesions. Some of these are hyperdense but also unchanged dating back to the 11/17/2021 exam and favored to represent hyperdense or hemorrhagic cysts. Small renal cortical calcifications and or nonobstructing stones. A right lower quadrant renal transplant demonstrates no hydronephrosis. Small hiatal hernia. Mild fluid distention of a few small bowel loops in the upper abdomen. No bowel obstruction. The cecum is mobile. The appendix is not identified with certainty. There is some localized thickening of the proximal ascending colon. This does appear to be new compared to the previous examination. Moderate stool in the distal colon. Residual contrast in the colon likely due to contrast administered on the prior examination. Uterus is atrophic. Urinary bladder is largely decompressed with Chin catheter. Minimal gas in the bladder likely due to the catheter. Partial visualization of femoral to femoral bypass graft. Degenerative changes are present in the spine and pelvis. Please see separate dictated report for CT of the chest.     Thickening of the proximal ascending colon. Given the short-term change, this is favored to represent a localized area of colitis likely infectious or inflammatory. Neoplasia thought less likely but follow-up to resolution would be recommended. Moderate stool in the distal colon. Minimal distention of several proximal small bowel loops likely incidental or due to mild enteritis. Small hiatal hernia. Other chronic appearing findings.  RECOMMENDATIONS: Unavailable     IR GUIDED IVC FILTER PLACEMENT    Result Date: 2/26/2022  EXAMINATION: INFERIOR VENA CAVA (IVC) FILTER INSERTION 2/26/2022 Procedural Personnel: Claudia Harding MD HISTORY: Indication: Bleeding with IV heparin ORDERING SYSTEM PROVIDED HISTORY: IVC filter TECHNOLOGIST PROVIDED HISTORY: Reason for exam:->IVC filter Anticoagulation contraindicated SEDATION: None CONTRAST: Contrast agent: Isovue 320 Contrast volume: 30mL FLUOROSCOPY DOSE AND TYPE OR TIME AND EXPOSURES: Fluoroscopy time/Number of Images: Fluoroscopy time 1.5 minutes. Reference air [place] kerma: [id number] PROCEDURE: Informed consent for the procedure including risks, benefits and alternatives was obtained and time-out was performed prior to the procedure. Universal protocol was followed. A suitable skin site was prepared and draped using all elements of maximal sterile barrier technique including sterile gloves, sterile gown, cap, mask, large sterile sheet, sterile ultrasound probe cover, hand hygiene, and cutaneous antisepsis. Time-out was called and the patient's order and identity were verified. Local anesthesia was administered. The vessel was sonographically evaluated and judged appropriate for access. Real time ultrasound was used to visualize needle entry into the vessel and an ultrasound image was stored in PACS. Vein accessed: Right common femoral vein. Access technique: Micropuncture set with 21 gauge needle A 0.035 guide wire was used to place a catheter into the inferior vena cava. A vena cavogram was performed. A Bard Campbell filter was deployed in routine fashion in the infrarenal IVC under fluoroscopic guidance. The sheath was removed and hemostasis was achieved with manual compression. A sterile dressing was applied. Patient tolerated procedure well. Complications: No immediate complications. Standardized report: [id number] [id number] FINDINGS: US: The access vein is patent.  Inferior Vena Cavogram: The vena cava is patent and normal in size without thrombus or anomalies. Post-placement imaging: Spot filmdemonstrates the filter in the infrarenalvena cava with less than 15 degrees tilt from the vena cava access. Successful vena cava filter placement. PLAN: Retrieval intent (MIPS): The filter is potentially retrievable. Plan/Timing For Retrieval: Ordering Physician/Contact For Retrieval Plan: Eledoc Javi     XR CHEST PORTABLE    Result Date: 3/9/2022  EXAMINATION: ONE XRAY VIEW OF THE CHEST 3/9/2022 10:28 am COMPARISON: 02/25/2022 HISTORY: ORDERING SYSTEM PROVIDED HISTORY: ams TECHNOLOGIST PROVIDED HISTORY: Reason for exam:->ams FINDINGS: The cardiac silhouette is at upper limits of normal in size. There are no findings of failure There is a linear focus seen within the right perihilar region favored to represent atelectasis. There is no focal consolidation seen within the right or left lung to suggest pneumonia. There is no pleural effusion. 1. Right perihilar discoid atelectasis. 2. There are no findings of failure or pneumonia. XR CHEST 1 VIEW    Result Date: 2/25/2022  EXAMINATION: ONE XRAY VIEW OF THE CHEST 2/25/2022 2:49 pm COMPARISON: None. HISTORY: ORDERING SYSTEM PROVIDED HISTORY: needs to be done with VQ scan TECHNOLOGIST PROVIDED HISTORY: Last chest Xray done more than 24 hours ago Reason for exam:->needs to be done with VQ scan FINDINGS: The heart is mildly enlarged. There are no findings of failure. The lungs are clear. There is no focal infiltrate or effusion. .     1. Mild cardiomegaly. There is no evidence of failure or pneumonia. CTA PULMONARY W CONTRAST    Result Date: 2/25/2022  EXAMINATION: CTA OF THE CHEST 2/25/2022 3:08 am TECHNIQUE: CTA of the chest was performed after the administration of intravenous contrast.  Multiplanar reformatted images are provided for review. MIP images are provided for review.  Dose modulation, iterative reconstruction, and/or weight based adjustment of the mA/kV was utilized to reduce the radiation dose to as low as reasonably achievable. COMPARISON: Portable chest dated 01/22/2022 and CT dated 01/17/2022 HISTORY: ORDERING SYSTEM PROVIDED HISTORY: Hypoxic, SOB TECHNOLOGIST PROVIDED HISTORY: Reason for exam:->Hypoxic, SOB Decision Support Exception - unselect if not a suspected or confirmed emergency medical condition->Emergency Medical Condition (MA) FINDINGS: Pulmonary Arteries: Assessment is limited due to patient motion artifact. A small or peripheral embolism would be difficult to exclude but no large or central embolism identified. Mediastinum: Moderately severe cardiomegaly is similar to previous. No pericardial effusion. No aortic dissection. Scattered vascular calcifications. Normal-size lymph nodes without adenopathy by size criteria. Lungs/pleura: No pneumothorax. Mild atelectasis in the right greater than left lung base. Otherwise, there has been significant improvement in aeration of the lungs since previous. Upper Abdomen: Partial visualization of the upper right kidney with cystic appearing foci as well as a partially visualized hyperdense lesion likely representing a hyperdense proteinaceous cyst and similar to previous but continued follow-up would be useful. Visualized portions of the upper abdomen demonstrate no acute abnormality. Small hiatal hernia. Soft Tissues/Bones: Degenerative changes are scattered in the spine. Allowing for patient motion artifact, no identified pulmonary embolism. Significant improvement in aeration of the lungs with some residual areas of presumed atelectasis in the right greater than left base. Cardiomegaly.  RECOMMENDATIONS: Unavailable     US DUP UPPER EXTREMITY LEFT VENOUS    Result Date: 2/27/2022  EXAMINATION: VENOUS ULTRASOUND OF THE LEFT UPPER EXTREMITY, 2/27/2022 12:55 pm TECHNIQUE: Duplex ultrasound using B-mode/gray scaled imaging and Doppler spectral analysis and color flow was obtained of the deep venous structures of the left upper extremity. COMPARISON: None. HISTORY: ORDERING SYSTEM PROVIDED HISTORY: left arm edema TECHNOLOGIST PROVIDED HISTORY: Portable please if possible Reason for exam:->left arm edema What reading provider will be dictating this exam?->CRC FINDINGS: There is normal flow and compressibility of the visualized venous structures. There is no evidence of echogenic thrombus. The veins demonstrate good compressibility with normal color flow study and spectral analysis. The AV fistula is patent. No evidence of DVT. IR ULTRASOUND GUIDANCE VASCULAR ACCESS    Result Date: 2/26/2022  EXAMINATION: INFERIOR VENA CAVA (IVC) FILTER INSERTION 2/26/2022 Procedural Personnel: Mirela Richard MD HISTORY: Indication: Bleeding with IV heparin ORDERING SYSTEM PROVIDED HISTORY: IVC filter TECHNOLOGIST PROVIDED HISTORY: Reason for exam:->IVC filter Anticoagulation contraindicated SEDATION: None CONTRAST: Contrast agent: Isovue 320 Contrast volume: 30mL FLUOROSCOPY DOSE AND TYPE OR TIME AND EXPOSURES: Fluoroscopy time/Number of Images: Fluoroscopy time 1.5 minutes. Reference air [place] kerma: [id number] PROCEDURE: Informed consent for the procedure including risks, benefits and alternatives was obtained and time-out was performed prior to the procedure. Universal protocol was followed. A suitable skin site was prepared and draped using all elements of maximal sterile barrier technique including sterile gloves, sterile gown, cap, mask, large sterile sheet, sterile ultrasound probe cover, hand hygiene, and cutaneous antisepsis. Time-out was called and the patient's order and identity were verified. Local anesthesia was administered. The vessel was sonographically evaluated and judged appropriate for access. Real time ultrasound was used to visualize needle entry into the vessel and an ultrasound image was stored in PACS. Vein accessed: Right common femoral vein.  Access technique: Micropuncture set with 21 gauge needle A 0.035 guide wire was used to place a catheter into the inferior vena cava. A vena cavogram was performed. A Bard Rosa filter was deployed in routine fashion in the infrarenal IVC under fluoroscopic guidance. The sheath was removed and hemostasis was achieved with manual compression. A sterile dressing was applied. Patient tolerated procedure well. Complications: No immediate complications. Standardized report: [id number] [id number] FINDINGS: US: The access vein is patent. Inferior Vena Cavogram: The vena cava is patent and normal in size without thrombus or anomalies. Post-placement imaging: Spot filmdemonstrates the filter in the infrarenalvena cava with less than 15 degrees tilt from the vena cava access. Successful vena cava filter placement. PLAN: Retrieval intent (MIPS): The filter is potentially retrievable. Plan/Timing For Retrieval: Ordering Physician/Contact For Retrieval Plan: Diamond GUERRIER MODIFIED BARIUM SWALLOW W VIDEO    Result Date: 2/26/2022  EXAMINATION: MODIFIED BARIUM SWALLOW WAS PERFORMED IN CONJUNCTION WITH SPEECH PATHOLOGY SERVICES TECHNIQUE: Fluoroscopic evaluation of the swallowing mechanism was performed using cineradiography with multiple consistency of barium product in conjunction with speech pathology services. FLUOROSCOPY DOSE AND TYPE OR TIME AND EXPOSURES: Fluoroscopy time 2.1 minutes. Air kerma 5.49 mGy COMPARISON: None HISTORY: ORDERING SYSTEM PROVIDED HISTORY: dysphagia, aspriation pna? TECHNOLOGIST PROVIDED HISTORY: Reason for exam:->dysphagia, aspriation pna? FINDINGS: There was oral delay and pharyngeal delay. Laryngeal elevation was adequate. There was retention in the piriform sinuses but not the vallecula. Transient laryngeal penetration is seen with thin liquid barium. Otherwise, no aspiration or laryngeal penetration is seen. Strands it laryngeal penetration with thin liquid barium. No evidence of aspiration.  Please see separate speech pathology report for full discussion of findings and recommendations. RECOMMENDATIONS: Unavailable     US DUP LOWER EXTREMITIES BILATERAL VENOUS    Result Date: 2/25/2022  EXAMINATION: DUPLEX VENOUS ULTRASOUND OF THE BILATERAL LOWER EXTREMITIES2/25/2022 2:47 pm TECHNIQUE: Duplex ultrasound using B-mode/gray scaled imaging, Doppler spectral analysis and color flow Doppler was obtained of the deep venous structures of the lower bilateral extremities. COMPARISON: None. HISTORY: ORDERING SYSTEM PROVIDED HISTORY: R/O DVT TECHNOLOGIST PROVIDED HISTORY: Reason for exam:->R/O DVT What reading provider will be dictating this exam?->CRC FINDINGS: Right lower extremity:  The visualized veins of the bilateral lower extremities are patent and free of echogenic thrombus. The veins demonstrate good compressibility with normal color flow study and spectral analysis. Left lower extremity: There are incompletely occlusive thrombi within the mid and distal left femoral vein and the popliteal vein. They have developed in the interim since the prior lower extremity Doppler from 11/17/2021.     1. INCOMPLETELY OCCLUSIVE THROMBI in the mid and distal LEFT femoral vein and within the popliteal vein. They have developed in the interim since the previous study from 11/17/2021. 2. No evidence of DVT in the right lower extremity.  RECOMMENDATIONS: Unavailable     LABS  Recent Labs     03/09/22  1032 03/10/22  1329 03/11/22  0827   WBC 24.7* 20.0* 13.0*   HGB 10.8* 8.6* 8.5*   HCT 37.4 28.7* 30.5*   .4* 107.5* 111.3*    145 144     Recent Labs     03/09/22  1032 03/09/22  1032 03/10/22  0549 03/10/22  0549 03/11/22  0827     --  143  --  143   K 4.7  --  4.9   < > 4.2      < > 108*   < > 108*   CO2 22   < > 21*   < > 23   BUN 30*  --  38*  --  43*   CREATININE 1.6*  --  1.7*  --  2.2*   GFRAA 39  --  36  --  27   LABGLOM 32  --  30  --  22   GLUCOSE 96  --  75  --  75   PROT 5.6*  --  4.9*  --  4.8*   LABALBU 2.5*  --  2.0*  -- 2.1*   CALCIUM 10.1  --  9.9  --  9.3   BILITOT 0.6  --  0.6  --  0.4   ALKPHOS 114*  --  101  --  95   AST 13  --  18  --  15   ALT 7  --  9  --  6    < > = values in this interval not displayed. Recent Labs     03/10/22  1329   PROCAL 0.33*     Lab Results   Component Value Date    CRP 18.3 (H) 01/23/2022    CRP 34.6 (H) 11/17/2021     Lab Results   Component Value Date    SEDRATE 61 (H) 01/23/2022    SEDRATE 62 (H) 11/20/2021     No results found for: [id number]  Lab Results   Component Value Date    COVID19 Not Detected 03/02/2022    COVID19 Not Detected 02/25/2022     COVID-19/MARINA-COV2 LABS  Recent Labs     03/09/22  1032 03/10/22  0549 03/10/22  1329 03/11/22  0827   PROCAL  --   --  0.33*  --    AST 13 18  --  15   ALT 7 9  --  6      MICROBIOLOGY:          Lab Results   Component Value Date    BC 24 Hours no growth 03/09/2022    BC 5 Days no growth 02/27/2022    BC  02/25/2022     This organism was isolated in one set. Susceptibility testing is not routinely done as this  organism frequently represents skin contamination. Additional testing can be ordered by calling the  Microbiology Department.       ORG Pseudomonas aeruginosa 03/09/2022    ORG Staphylococcus coagulase-negative 02/25/2022    ORG Enterococcus faecalis 01/12/2022     Lab Results   Component Value Date    BLOODCULT2 24 Hours no growth 03/09/2022    BLOODCULT2 5 Days no growth 02/25/2022    BLOODCULT2 5 Days no growth 01/24/2022    ORG Pseudomonas aeruginosa 03/09/2022    ORG Staphylococcus coagulase-negative 02/25/2022    ORG Enterococcus faecalis 01/12/2022       WOUND/ABSCESS   Date Value Ref Range Status   01/24/2022 Growth not present  Final        Urine Culture, Routine   Date Value Ref Range Status   03/09/2022 75,000 CFU/ml  Final   02/26/2022 Growth not present  Final   01/23/2022 Growth not present  Final     MRSA Culture Only   Date Value Ref Range Status   11/17/2021 Methicillin resistant Staph aureus not isolated  Final FINAL IMPRESSION    Patient is a 73 y.o. female who presented with   Chief Complaint   Patient presents with    Altered Mental Status     from NH    and admitted for Confusion [R41.0]  Acute respiratory failure with hypoxia ([address] [J96.01]  Acute respiratory failure with hypoxemia ([address] [J96.01]     PT WITH ENCEPHALOPTHAY MEATBOLI VS INFECTIOUS ETIOLOGU   H/O hsv IG G +  S/P RX ASPIRATION PNEUMONIA/COLITIS  H/O COVID  KIDNEY TRANSPLANT ON IMMUNOSUPPRESSIVE  Poss UTI Pseudomonas   MASTOIDITIS/SINUSITIS  CIDFF +    CHECK BLOOD CX   CDIFF RX   F/U LABS AND CULTURES  ACYCLOVIR     follow labs cr 2.2    Adjust RX  Fidaxomicin (DIFICID) tablet 200 mg, BID  levoFLOXacin (LEVAQUIN) 750 MG/150ML infusion 750 mg, Q48H       Imaging and labs were reviewed per medical records      Thank you for involving me in the care of Ortega Grijalva. Please do not hesitate to call for any questions or concerns.          Electronically signed by Justo Herrera MD on 3/11/2022 at 3:20 PM

## 2022-03-11 NOTE — CARE COORDINATION
Ss note:3/11/2022.10:57 AM NO covid testing, previous testing was neg on 3-2-22. WILL NEED RAPID COVID ON DAY OF DISCHARGE. Per liaison [place] with [facility] [place], pt has been accepted and bed is available, NO PRECERT. Per nursing pt for PICC line. Pt presented from [facility] however pt is somewhat confused, papito Badillo updated that [facility] can accept and he prefers this SNF at discharge. Will need updated therapy notes within 24 hours of discharge, NO PRECERT. Pt has [place] medicaid secondary. Hens initiated. Papito Badillo will need notified when discharge is written, need signed GINA.  Lujean Ganser, LSW

## 2022-03-12 ENCOUNTER — APPOINTMENT (OUTPATIENT)
Dept: ULTRASOUND IMAGING | Age: 67
DRG: 177 | End: 2022-03-12
Payer: MEDICARE

## 2022-03-12 LAB
ALBUMIN SERPL-MCNC: 2 G/DL (ref 3.5–5.2)
ALP BLD-CCNC: 59 U/L (ref 35–104)
ALT SERPL-CCNC: 9 U/L (ref 0–32)
ANION GAP SERPL CALCULATED.3IONS-SCNC: 13 MMOL/L (ref 7–16)
AST SERPL-CCNC: 12 U/L (ref 0–31)
BASOPHILS ABSOLUTE: 0.03 E9/L (ref 0–0.2)
BASOPHILS RELATIVE PERCENT: 0.4 % (ref 0–2)
BILIRUB SERPL-MCNC: 0.4 MG/DL (ref 0–1.2)
BUN BLDV-MCNC: 47 MG/DL (ref 6–23)
CALCIUM SERPL-MCNC: 8.8 MG/DL (ref 8.6–10.2)
CHLORIDE BLD-SCNC: 108 MMOL/L (ref 98–107)
CHLORIDE URINE RANDOM: <20 MMOL/L
CO2: 22 MMOL/L (ref 22–29)
CREAT SERPL-MCNC: 2.5 MG/DL (ref 0.5–1)
CREATININE URINE: 146 MG/DL (ref 29–226)
EOSINOPHIL, URINE: 0 % (ref 0–1)
EOSINOPHILS ABSOLUTE: 0.1 E9/L (ref 0.05–0.5)
EOSINOPHILS RELATIVE PERCENT: 1.3 % (ref 0–6)
GFR AFRICAN AMERICAN: 23
GFR NON-AFRICAN AMERICAN: 19 ML/MIN/1.73
GLUCOSE BLD-MCNC: 89 MG/DL (ref 74–99)
HCT VFR BLD CALC: 26 % (ref 34–48)
HEMOGLOBIN: 7.4 G/DL (ref 11.5–15.5)
IMMATURE GRANULOCYTES #: 0.14 E9/L
IMMATURE GRANULOCYTES %: 1.8 % (ref 0–5)
LYMPHOCYTES ABSOLUTE: 0.72 E9/L (ref 1.5–4)
LYMPHOCYTES RELATIVE PERCENT: 9 % (ref 20–42)
MCH RBC QN AUTO: 31.5 PG (ref 26–35)
MCHC RBC AUTO-ENTMCNC: 28.5 % (ref 32–34.5)
MCV RBC AUTO: 110.6 FL (ref 80–99.9)
MONOCYTES ABSOLUTE: 0.82 E9/L (ref 0.1–0.95)
MONOCYTES RELATIVE PERCENT: 10.3 % (ref 2–12)
NEUTROPHILS ABSOLUTE: 6.17 E9/L (ref 1.8–7.3)
NEUTROPHILS RELATIVE PERCENT: 77.2 % (ref 43–80)
PDW BLD-RTO: 15.9 FL (ref 11.5–15)
PLATELET # BLD: 140 E9/L (ref 130–450)
PMV BLD AUTO: 13.5 FL (ref 7–12)
POLYCHROMASIA: ABNORMAL
POTASSIUM SERPL-SCNC: 3.7 MMOL/L (ref 3.5–5)
PROTEIN PROTEIN: 28 MG/DL (ref 0–12)
PROTEIN/CREAT RATIO: 0.2
PROTEIN/CREAT RATIO: 0.2 (ref 0–0.2)
RBC # BLD: 2.35 E12/L (ref 3.5–5.5)
SODIUM BLD-SCNC: 143 MMOL/L (ref 132–146)
SODIUM URINE: 28 MMOL/L
TOTAL PROTEIN: 4.1 G/DL (ref 6.4–8.3)
WBC # BLD: 8 E9/L (ref 4.5–11.5)

## 2022-03-12 PROCEDURE — 1200000000 HC SEMI PRIVATE

## 2022-03-12 PROCEDURE — 6360000002 HC RX W HCPCS: Performed by: SPECIALIST

## 2022-03-12 PROCEDURE — 2580000003 HC RX 258: Performed by: SPECIALIST

## 2022-03-12 PROCEDURE — 2700000000 HC OXYGEN THERAPY PER DAY

## 2022-03-12 PROCEDURE — 2580000003 HC RX 258: Performed by: INTERNAL MEDICINE

## 2022-03-12 PROCEDURE — 80053 COMPREHEN METABOLIC PANEL: CPT

## 2022-03-12 PROCEDURE — 84300 ASSAY OF URINE SODIUM: CPT

## 2022-03-12 PROCEDURE — 82570 ASSAY OF URINE CREATININE: CPT

## 2022-03-12 PROCEDURE — 85025 COMPLETE CBC W/AUTO DIFF WBC: CPT

## 2022-03-12 PROCEDURE — 6360000002 HC RX W HCPCS: Performed by: INTERNAL MEDICINE

## 2022-03-12 PROCEDURE — 82436 ASSAY OF URINE CHLORIDE: CPT

## 2022-03-12 PROCEDURE — 87205 SMEAR GRAM STAIN: CPT

## 2022-03-12 PROCEDURE — 84156 ASSAY OF PROTEIN URINE: CPT

## 2022-03-12 PROCEDURE — 36415 COLL VENOUS BLD VENIPUNCTURE: CPT

## 2022-03-12 PROCEDURE — 6370000000 HC RX 637 (ALT 250 FOR IP): Performed by: INTERNAL MEDICINE

## 2022-03-12 PROCEDURE — 76770 US EXAM ABDO BACK WALL COMP: CPT

## 2022-03-12 PROCEDURE — 6370000000 HC RX 637 (ALT 250 FOR IP): Performed by: SPECIALIST

## 2022-03-12 RX ORDER — MIDODRINE HYDROCHLORIDE 5 MG/1
5 TABLET ORAL ONCE
Status: COMPLETED | OUTPATIENT
Start: 2022-03-12 | End: 2022-03-12

## 2022-03-12 RX ADMIN — ACYCLOVIR SODIUM 350 MG: 50 INJECTION, SOLUTION INTRAVENOUS at 17:49

## 2022-03-12 RX ADMIN — ACYCLOVIR SODIUM 350 MG: 50 INJECTION, SOLUTION INTRAVENOUS at 09:47

## 2022-03-12 RX ADMIN — CYCLOSPORINE 100 MG: 25 CAPSULE, GELATIN COATED ORAL at 21:31

## 2022-03-12 RX ADMIN — Medication 10 ML: at 21:35

## 2022-03-12 RX ADMIN — HEPARIN 100 UNITS: 100 SYRINGE at 09:54

## 2022-03-12 RX ADMIN — FOLIC ACID 1 MG: 1 TABLET ORAL at 09:55

## 2022-03-12 RX ADMIN — Medication 10 ML: at 09:55

## 2022-03-12 RX ADMIN — LEVOTHYROXINE SODIUM 50 MCG: 0.05 TABLET ORAL at 06:25

## 2022-03-12 RX ADMIN — SODIUM CHLORIDE: 9 INJECTION, SOLUTION INTRAVENOUS at 06:23

## 2022-03-12 RX ADMIN — MYCOPHENOLATE MOFETIL 500 MG: 250 CAPSULE ORAL at 09:55

## 2022-03-12 RX ADMIN — FIDAXOMICIN 200 MG: 200 TABLET, FILM COATED ORAL at 09:56

## 2022-03-12 RX ADMIN — ACYCLOVIR SODIUM 350 MG: 50 INJECTION, SOLUTION INTRAVENOUS at 01:43

## 2022-03-12 RX ADMIN — MIDODRINE HYDROCHLORIDE 5 MG: 5 TABLET ORAL at 21:30

## 2022-03-12 RX ADMIN — FIDAXOMICIN 200 MG: 200 TABLET, FILM COATED ORAL at 21:36

## 2022-03-12 RX ADMIN — MYCOPHENOLATE MOFETIL 500 MG: 250 CAPSULE ORAL at 21:32

## 2022-03-12 RX ADMIN — ATORVASTATIN CALCIUM 10 MG: 10 TABLET, FILM COATED ORAL at 21:30

## 2022-03-12 RX ADMIN — DESVENLAFAXINE 50 MG: 50 TABLET, EXTENDED RELEASE ORAL at 12:22

## 2022-03-12 RX ADMIN — PREDNISONE 5 MG: 5 TABLET ORAL at 09:56

## 2022-03-12 RX ADMIN — CYCLOSPORINE 100 MG: 25 CAPSULE, GELATIN COATED ORAL at 09:55

## 2022-03-12 RX ADMIN — HEPARIN 100 UNITS: 100 SYRINGE at 21:34

## 2022-03-12 RX ADMIN — PANTOPRAZOLE SODIUM 40 MG: 40 TABLET, DELAYED RELEASE ORAL at 06:25

## 2022-03-12 RX ADMIN — ALLOPURINOL 100 MG: 100 TABLET ORAL at 09:56

## 2022-03-12 RX ADMIN — APIXABAN 5 MG: 5 TABLET, FILM COATED ORAL at 21:30

## 2022-03-12 RX ADMIN — APIXABAN 5 MG: 5 TABLET, FILM COATED ORAL at 09:56

## 2022-03-12 RX ADMIN — SODIUM CHLORIDE: 9 INJECTION, SOLUTION INTRAVENOUS at 17:46

## 2022-03-12 ASSESSMENT — PAIN SCALES - GENERAL: PAINLEVEL_OUTOF10: 0

## 2022-03-12 NOTE — PROGRESS NOTES
Progress Note  Date:3/12/2022       Room:[facility]041-  Patient Jocelyn Otero     YOB: 1955     Age:66 y.o. Subjective    Subjective:  Symptoms:  Stable. (Lethargic ). Diet:  Poor intake. Activity level: Impaired due to weakness. Review of Systems  Objective         Vitals Last 24 Hours:  TEMPERATURE:  Temp  Av.8 °F (36.6 °C)  Min: 96.5 °F (35.8 °C)  Max: 98.8 °F (37.1 °C)  RESPIRATIONS RANGE: Resp  Av.3  Min: 16  Max: 20  PULSE OXIMETRY RANGE: SpO2  Av.3 %  Min: 98 %  Max: 99 %  PULSE RANGE: Pulse  Av  Min: 78  Max: 84  BLOOD PRESSURE RANGE: Systolic (82AGL), RYT:06 , Min:85 , HZ   ; Diastolic (65XLS), CJV:18, Min:48, Max:55    I/O (24Hr): Intake/Output Summary (Last 24 hours) at 3/12/2022 1507  Last data filed at 3/12/2022 1052  Gross per 24 hour   Intake 220 ml   Output 200 ml   Net 20 ml     Objective:  General Appearance: In no acute distress (lethargic , arousable ). Vital signs: (most recent): Blood pressure (!) 115/55, pulse 78, temperature 96.5 °F (35.8 °C), temperature source Infrared, resp. rate 19, height 5' 2\" (1.575 m), weight 220 lb (99.8 kg), SpO2 99 %. (Slight Hypotension). Output: Producing urine. HEENT: Normal HEENT exam.    Lungs:  Normal effort and normal respiratory rate. There are decreased breath sounds. Heart: Normal rate. Regular rhythm. Positive for murmur. Abdomen: Abdomen is soft and distended. Bowel sounds are normal.   There is no abdominal tenderness. Extremities: (Edema +1 in the left leg )  Pulses: Distal pulses are intact. Neurological: (Lethargic and sleepy ). Pupils:  Pupils are equal, round, and reactive to light.       Labs/Imaging/Diagnostics    Labs:  CBC:  Recent Labs     03/10/22  1329 22  0827 22  0400   WBC 20.0* 13.0* 8.0   RBC 2.67* 2.74* 2.35*   HGB 8.6* 8.5* 7.4*   HCT 28.7* 30.5* 26.0*   .5* 111.3* 110.6*   RDW 16.1* 15.9* 15.9*    144 140 CHEMISTRIES:  Recent Labs     03/10/22  0549 03/11/22  0827 03/12/22  0400    143 143   K 4.9 4.2 3.7   * 108* 108*   CO2 21* 23 22   BUN 38* 43* 47*   CREATININE 1.7* 2.2* 2.5*   GLUCOSE 75 75 89     PT/INR:No results for input(s): PROTIME, INR in the last 72 hours. APTT:No results for input(s): APTT in the last 72 hours. LIVER PROFILE:  Recent Labs     03/10/22  0549 03/11/22  0827 03/12/22  0400   AST 18 15 12   ALT 9 6 9   BILITOT 0.6 0.4 0.4   ALKPHOS 101 95 59       Imaging Last 24 Hours:  XR HUMERUS LEFT (MIN 2 VIEWS)    Result Date: 3/10/2022  EXAMINATION: TWO XRAY VIEWS OF THE LEFT HUMERUS 3/10/2022 9:31 am COMPARISON: None. HISTORY: ORDERING SYSTEM PROVIDED HISTORY: limited mobility TECHNOLOGIST PROVIDED HISTORY: Reason for exam:->limited mobility FINDINGS: There is no evidence of fracture or dislocation. No significant osteo-degenerative changes or other osseous abnormalities are identified. There is increased separation of the humeral head from the osseous glenoid and AC joint. This could represent subluxation. The presence of a large synovial effusion, hematoma, or mass cannot be excluded. CT scan of the left shoulder can be considered for further evaluation. Surgical clips are identified in the soft tissues about the mid to distal humerus. No fracture or dislocation. Increased separation of the humeral head from the osseous glenoid and AC joint, which could represent subluxation. A large synovial effusion, hematoma, or mass cannot be excluded. CT scan of the left shoulder can be considered for further evaluation.      CT HEAD WO CONTRAST    Result Date: 3/10/2022  EXAMINATION: CT OF THE HEAD WITHOUT CONTRAST  3/10/2022 3:29 pm TECHNIQUE: CT of the head was performed without the administration of intravenous contrast. Dose modulation, iterative reconstruction, and/or weight based adjustment of the mA/kV was utilized to reduce the radiation dose to as low as reasonably achievable. COMPARISON: February 25, 2022 HISTORY: ORDERING SYSTEM PROVIDED HISTORY: AMS TECHNOLOGIST PROVIDED HISTORY: Reason for exam:->AMS Has a \"code stroke\" or \"stroke alert\" been called? ->No FINDINGS: BRAIN/VENTRICLES: There are age related cortical atrophy and periventricular white matter ischemic changes. There is no acute intracranial hemorrhage, mass effect or midline shift. No abnormal extra-axial fluid collection. The gray-white differentiation is maintained without evidence of an acute infarct. There is no evidence of hydrocephalus. ORBITS: The visualized portion of the orbits demonstrate no acute abnormality. SINUSES: There is partial opacification of bilateral mastoid air cells. Mucosal thickening in the right sphenoid sinus. SOFT TISSUES/SKULL:  No acute abnormality of the visualized skull or soft tissues. No acute intracranial abnormality or hemorrhage. Bilateral mastoiditis and right sphenoid sinusitis. IR FLUORO GUIDED CVA DEVICE PLMT/REPLACE/REMOVAL    Result Date: 3/11/2022  PROCEDURE: ULTRASOUND GUIDED VASCULAR ACCESS. FLUOROSCOPY GUIDED PICC PLACEMENT 3/11/2022. HISTORY: ORDERING SYSTEM PROVIDED HISTORY: picc line/ medline TECHNOLOGIST PROVIDED HISTORY: Reason for exam:->picc line/ medline SEDATION: None FLUOROSCOPY DOSE AND TYPE OR TIME AND EXPOSURES: Fluoroscopy time equals 8.2 minutes. Total dose equals 116 mGy TECHNIQUE: The procedure was performed under ultrasound and fluoroscopic guidance. And ultrasound image and fluoroscopic image was obtained for medical records. Jace Bertin FINDINGS: The patient's right arm was prepped and draped in a sterile fashion using a maximum sterile barrier technique. Following the uneventful administration of lidocaine I introduced a micro puncture needle into the right basilic vein using ultrasound guidance. Through the micropuncture needle a microwire was introduced into the vein. Over the microwire a peel-away sheath was placed within the vein.   A catheter measurement was obtained. The catheter was then trimmed to 25 cm and introduced over the wire into the right basilic vein. The catheter tip was placed within the right subclavian vein. .  The peel-away sheath was then removed and the PICC line was secured to the skin. A sterile dressing was applied. The patient tolerated the procedure well and no complications. A fluoroscopic image was obtained which confirms position of the PICC line catheter. Successful placement of a dual lumen power PICC line. The tip of the PICC line catheter was placed within the distal right subclavian vein. Mariam Han IMPRESSION: Successful ultrasound and fluoroscopy guided PICC placement     XR SHOULDER LEFT (MIN 2 VIEWS)    Result Date: 3/10/2022  EXAMINATION: THREE XRAY VIEWS OF THE LEFT SHOULDER 3/10/2022 9:31 am COMPARISON: None HISTORY: ORDERING SYSTEM PROVIDED HISTORY: limited mobility and bruising TECHNOLOGIST PROVIDED HISTORY: Reason for exam:->limited mobility and bruising FINDINGS: There is no evidence of fracture. There is increased separation of the humeral head from the osseous glenoid and the acromioclavicular joint, which could represent subluxation. Otherwise, the presence of a large synovial effusion, hematoma, or mass cannot be excluded. Clinical correlation is recommended. CT scan of the left shoulder can be performed for further evaluation. The heart is enlarged. Surgical clips are identified in the soft tissues surrounding the midportion of the humerus. No fracture. Increased separation of the humeral head from the osseous glenoid and AC joint, which could represent subluxation. Otherwise, a large synovial effusion, hematoma, or mass cannot be excluded. Clinical correlation is recommended. CT scan of the left shoulder can be performed for further evaluation. Cardiomegaly. IR ULTRASOUND GUIDANCE VASCULAR ACCESS    Result Date: 3/11/2022  PROCEDURE: ULTRASOUND GUIDED VASCULAR ACCESS.  FLUOROSCOPY GUIDED PICC PLACEMENT 3/11/2022. HISTORY: ORDERING SYSTEM PROVIDED HISTORY: picc line/ medline TECHNOLOGIST PROVIDED HISTORY: Reason for exam:->picc line/ medline SEDATION: None FLUOROSCOPY DOSE AND TYPE OR TIME AND EXPOSURES: Fluoroscopy time equals 8.2 minutes. Total dose equals 116 mGy TECHNIQUE: The procedure was performed under ultrasound and fluoroscopic guidance. And ultrasound image and fluoroscopic image was obtained for medical records. Rachell Salazar FINDINGS: The patient's right arm was prepped and draped in a sterile fashion using a maximum sterile barrier technique. Following the uneventful administration of lidocaine I introduced a micro puncture needle into the right basilic vein using ultrasound guidance. Through the micropuncture needle a microwire was introduced into the vein. Over the microwire a peel-away sheath was placed within the vein. A catheter measurement was obtained. The catheter was then trimmed to 25 cm and introduced over the wire into the right basilic vein. The catheter tip was placed within the right subclavian vein. .  The peel-away sheath was then removed and the PICC line was secured to the skin. A sterile dressing was applied. The patient tolerated the procedure well and no complications. A fluoroscopic image was obtained which confirms position of the PICC line catheter. Successful placement of a dual lumen power PICC line. The tip of the PICC line catheter was placed within the distal right subclavian vein. Rachell Salazar  IMPRESSION: Successful ultrasound and fluoroscopy guided PICC placement     Assessment//Plan           Hospital Problems           Last Modified POA    * (Principal) Acute respiratory failure with hypoxia ([address] 3/9/2022 Yes    Kidney transplanted 3/10/2022 Yes    Elevated troponin 3/10/2022 Yes    Paroxysmal atrial fibrillation ([address] 3/10/2022 Yes    Hypotension 3/10/2022 Yes    LBBB (left bundle branch block) 3/10/2022 Yes    History of DVT (deep vein thrombosis) 3/10/2022 Yes    Chronic anticoagulation 3/10/2022 Yes    Acute respiratory failure with hypoxemia ([facility] [address] 3/11/2022 Yes        Assessment:    Condition: In stable condition. (Sepsis ? Cause aspiration pneumonia  Was on zosyn and vanc , now on  levaquin ,   Acute respiratory failure secondary to above on o2 ,   Metabolic encephalopathy in immuno compromised host , on acyclovir herpes simplex ,   cdiff colitis on deficid  Left femoral and popliteal DVT on eliquis . S/p IVC filter placement   Acute on chronic renal failure , on  iv fluids and worsening ,   hyperlpid , continue lipitor   Bipolar disorder on pristiq  And clozaril  Chronic renal  failure s/p renal transplant 2015 . On cellcept and cyclosporine   Hypothyroidism on levothyroxine . Hypotension hold lmetorpolol     ).        Electronically signed by Morgan Childers MD on 3/11/22 at 8:21 PM EST

## 2022-03-12 NOTE — PROGRESS NOTES
[facility]     [address] [place] [address]  [place], [facility] [address]  Phone [phone]   [phone]      Admit Date: 3/9/2022 10:02 AM  Pt Name: Niru Vail  MRN: [id number]  : 1955  Reason for Consult:    Chief Complaint   Patient presents with    Altered Mental Status     from NH     Requesting Physician:  Aldona Habermann, MD  PCP: Evelyn Lauren MD  History Obtained From:  patient, chart   ID consulted for Confusion [R41.0]  Acute respiratory failure with hypoxia ([address] [J96.01]  Acute respiratory failure with hypoxemia ([address] [J96.01]  on hospital day 1  CHIEF COMPLAINT       Chief Complaint   Patient presents with    Altered Mental Status     from NH     HISTORYOF PRESENT ILLNESS   Niru Vail is a 73 y.o. female who presents with   has a past medical history of Abnormal EKG, Acute gout involving toe of right foot, Acute on chronic combined systolic (congestive) and diastolic (congestive) heart failure ([address] Cancer ([address] Cerebrovascular disease, Clotting disorder ([address] Depression, Hemodialysis patient ([address] History of blood transfusion, Hyperlipidemia, Hypertension, Hypothyroidism, Leg swelling, Lymphedema of both lower extremities, Peripheral vascular disease ([address] Renal failure, Thrombophlebitis, and Thyroid disease. Altered Mental Status      Know to ID   · Last seen on 3/2/2022-d/c on Augmentin cover poss aspiration and colitis  Pt came in due to altered mental status. Wbc24.7 hgb10.8 vsx718 cr1.6 TMAX99.3  PT WAS SEEN AND EXAMINED WITH NURSES PRESENT  PT IS NOT AROUSABLE UNABLE TO OBTAIN HISTORY    PT HAS SOFT STOOLS SOME ODOR  SACRAL AREA VIEWED HAS SMALL DTI AREA  CXRY 1. Right perihilar discoid atelectasis. 2. There are no findings of failure or pneumonia.    LEFT SHOULDER No fracture.  Increased separation of the humeral head from the osseous   glenoid and AC joint, which could represent subluxation.  Otherwise, a large   synovial effusion, hematoma, or mass cannot be excluded.  Clinical   correlation is recommended.  CT scan of the left shoulder can be performed   for further evaluation.  Cardiomegaly. LEFT HUMERUS No fracture or dislocation.  Increased separation of the humeral head from   the osseous glenoid and AC joint, which could represent subluxation.  A large   synovial effusion, hematoma, or mass cannot be excluded.  CT scan of the left   shoulder can be considered for further evaluation. piperacillin-tazobactam (ZOSYN) 3,375 mg in dextrose 5 % 50 mL IVPB extended infusion (mini-bag), Q8H  vancomycin (VANCOCIN) oral solution 125 mg, 4 times per day  cycloSPORINE (SANDIMMUNE) capsule 100 mg, BID  mycophenolate (CELLCEPT) capsule 500 mg, BID  metoprolol succinate (TOPROL XL) extended release tablet 50 mg, Daily  predniSONE (DELTASONE) tablet 5 mg, Daily     LAST BLOOD CX 2/25 cons  2/28 CLEMENCIA  Summary   Technically difficult study. No definate evidence of vegetation consistent with endocarditis. Normal LV segmental wall motion. Ejection fraction is visually estimated at 60 to 65%. Normal right ventricular size and function.     DOS  03/12/22   briefly arouses has no abd pain    3/11/2022  In bed open eyes briefly still lethargic  cdiff +  REVIEW OF SYSTEMS     CONSTITUTIONAL:   AS IN HPI     Medications Prior to Admission: pantoprazole (PROTONIX) 40 MG tablet, Take 1 tablet by mouth every morning (before breakfast)  amoxicillin-clavulanate (AUGMENTIN) 875-125 MG per tablet, Take 1 tablet by mouth every 12 hours for 10 days  apixaban (ELIQUIS) 5 MG TABS tablet, Take 5 mg by mouth 2 times daily Take in the morning and at bedtime for 3 months (started 2/19/2022)  atorvastatin (LIPITOR) 10 MG tablet, Take 10 mg by mouth daily  lisinopril (PRINIVIL;ZESTRIL) 5 MG tablet, take 1 tablet by mouth once daily  pregabalin (LYRICA) 75 MG capsule,   nystatin (MYCOSTATIN) 145916 UNIT/GM powder, Apply 3 times daily.  ipratropium-albuterol (DUONEB) 0.5-2.5 (3) MG/3ML SOLN nebulizer solution, Inhale 3 mLs into the lungs every 6 hours as needed for Shortness of Breath  ondansetron (ZOFRAN-ODT) 4 MG disintegrating tablet, Take 1 tablet by mouth every 8 hours as needed for Nausea or Vomiting  allopurinol (ZYLOPRIM) 100 MG tablet, Take 100 mg by mouth daily  Cyanocobalamin (B-12 COMPLIANCE INJECTION) 1000 MCG/ML KIT, Inject as directed monthly  metoprolol succinate (TOPROL XL) 50 MG extended release tablet, take 1 tablet by mouth once daily  desvenlafaxine succinate (PRISTIQ) 50 MG TB24 extended release tablet, Take 1 tablet by mouth every evening  cloZAPine (CLOZARIL) 50 MG tablet, Take 50 mg by mouth nightly  cycloSPORINE (SANDIMMUNE) 100 MG capsule, Take 100 mg by mouth 2 times daily  predniSONE (DELTASONE) 5 MG tablet, Take 5 mg by mouth daily  folic acid (FOLVITE) 1 MG tablet, Take 1 mg by mouth daily  docusate sodium (COLACE) 100 MG capsule, Take 100 mg by mouth 2 times daily  mycophenolate (CELLCEPT) 500 MG tablet, Take 1,000 mg by mouth 2 times daily  levothyroxine (SYNTHROID) 50 MCG tablet, Take 50 mcg by mouth daily.     budesonide (PULMICORT) 0.5 MG/2ML nebulizer suspension, Take 2 mLs by nebulization 2 times daily for 10 days  CURRENT MEDICATIONS     Current Facility-Administered Medications:     lidocaine 1 % injection 5 mL, 5 mL, IntraDERmal, Once, Den Coy MD    sodium chloride flush 0.9 % injection 5-40 mL, 5-40 mL, IntraVENous, 2 times per day, Den Coy MD, 10 mL at 03/12/22 0955    sodium chloride flush 0.9 % injection 5-40 mL, 5-40 mL, IntraVENous, PRN, Erin Ramirez MD    0.9 % sodium chloride infusion, 25 mL, IntraVENous, PRN, Erin Ramirez MD    heparin flush 100 UNIT/ML injection 100 Units, 1 mL, IntraVENous, 2 times per day, Den Coy MD, 100 Units at 03/12/22 0954    heparin flush 100 UNIT/ML injection 100 Units, 1 mL, IntraCATHeter, PRN, Den Coy MD    metoprolol tartrate (LOPRESSOR) tablet 12.5 mg, 12.5 mg, Oral, BID, Helio Garibay MD    Fidaxomicin (DIFICID) tablet 200 mg, 200 mg, Oral, BID, Justo Herrera MD, 200 mg at 03/12/22 0956    levoFLOXacin (LEVAQUIN) 750 MG/150ML infusion 750 mg, 750 mg, IntraVENous, Q48H, Justo Herrera MD    acyclovir (ZOVIRAX) 350 mg in dextrose 5 % 50 mL IVPB, 5 mg/kg (Adjusted), IntraVENous, Q8H, Justo Herrera MD, Last Rate: 50 mL/hr at 03/12/22 0947, 350 mg at 03/12/22 0947    0.9 % sodium chloride infusion, , IntraVENous, Continuous, Mary Carroll MD, Last Rate: 100 mL/hr at 03/12/22 0623, New Bag at 03/12/22 7119    apixaban (ELIQUIS) tablet 5 mg, 5 mg, Oral, BID, Carl Ramirez MD, 5 mg at 03/12/22 3606    desvenlafaxine succinate (PRISTIQ) extended release tablet 50 mg, 50 mg, Oral, Daily, Susannah Ramirez MD, 50 mg at 03/11/22 1132    ondansetron (ZOFRAN-ODT) disintegrating tablet 4 mg, 4 mg, Oral, Q8H PRN, Carl Ramirez MD    atorvastatin (LIPITOR) tablet 10 mg, 10 mg, Oral, Nightly, Susannah Ramirez MD, 10 mg at 03/09/22 2232    cloZAPine (CLOZARIL) tablet 50 mg, 50 mg, Oral, Nightly, Susannah Ramirez MD, 50 mg at 03/09/22 2232    [Held by provider] lisinopril (PRINIVIL;ZESTRIL) tablet 5 mg, 5 mg, Oral, Daily, Carl Ramirez MD, 5 mg at 03/10/22 9236    levothyroxine (SYNTHROID) tablet 50 mcg, 50 mcg, Oral, Daily, Luiz Macedo MD, 50 mcg at 03/12/22 7078    pantoprazole (PROTONIX) tablet 40 mg, 40 mg, Oral, QAM AC, Susannah Ramirez MD, 40 mg at 03/12/22 0762    docusate sodium (COLACE) capsule 100 mg, 100 mg, Oral, BID, Susannah Ramirez MD, 100 mg at 03/10/22 0831    cycloSPORINE (SANDIMMUNE) capsule 100 mg, 100 mg, Oral, BID, Susannah Ramirez MD, 100 mg at 03/12/22 0955    mycophenolate (CELLCEPT) capsule 500 mg, 500 mg, Oral, BID, Carl Ramirez MD, 500 mg at 03/12/22 0955    predniSONE (DELTASONE) tablet 5 mg, 5 mg, Oral, Daily, Luiz Macedo MD, 5 mg at 03/12/22 0956    allopurinol (ZYLOPRIM) tablet 100 mg, 100 mg, Oral, Daily, Jess Bajwa MD, 100 mg at 10/73/46 5116    folic acid (FOLVITE) tablet 1 mg, 1 mg, Oral, Daily, Jess Bajwa MD, 1 mg at 22 5734  ALLERGIES     Macrodantin [nitrofurantoin macrocrystal] and Sulfa antibiotics  Immunization History   Administered Date(s) Administered    COVID-19, Pfizer Purple top, DILUTE for use, 12+ yrs, 30mcg/0.3mL dose 2021, 2021, 2021      Internal Administration   First Dose COVID-19, [employer] top, DILUTE for use, 12+ yrs, 30mcg/0.3mL dose  2021   Second Dose COVID-19, Pfizer Purple top, DILUTE for use, 12+ yrs, 30mcg/0.3mL dose   2021       Last COVID Lab SARS-CoV-2 (no units)   Date Value   2021 Not Detected     SARS-CoV-2 Antibody, Total (no units)   Date Value   2022 Non-Reactive     SARS-CoV-2, PCR (no units)   Date Value   2022 Not Detected     SARS-CoV-2, NAAT (no units)   Date Value   2022 Not Detected            PAST MEDICAL HISTORY     Past Medical History:   Diagnosis Date    Abnormal EKG     left bundle branch block    Acute gout involving toe of right foot 2020    Acute on chronic combined systolic (congestive) and diastolic (congestive) heart failure (HCC)     Cancer (HCC)     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    Cerebrovascular disease     Clotting disorder ([address]     thrombophlebitis after c section delivery    Depression     15 years followed by dr Farzad Caldwell Hemodialysis patient [facility])     History of blood transfusion     Hyperlipidemia     Hypertension     Hypothyroidism     Leg swelling 2020    Lymphedema of both lower extremities 2020    Peripheral vascular disease ([address]     Renal failure 2012    renal transplant    Thrombophlebitis     after c section delivery    Thyroid disease      SURGICAL HISTORY       Past Surgical History:   Procedure Laterality Date     SECTION      one normal delivery    COLECTOMY N/A 1/15/2022    DIAGNOSTIC  LAPAROSCOPY LYSIS OF ADHESIONS performed by Danelle Shelton MD at [address] CATH LAB PROCEDURE  2006    normal coronaries and 1996 normal coronaries    DIALYSIS FISTULA CREATION  march and july 2012    phase one and two patient will start dialysis when needed   [address]    transfemoral venous bypass grafts    IR IVC FILTER PLACEMENT W IMAGING  2/26/2022    IR IVC FILTER PLACEMENT W IMAGING 2/26/2022 [facility] SPECIAL PROCEDURES    KIDNEY TRANSPLANT  2016    KIDNEY TRANSPLANT  2015    KIDNEY TRANSPLANT  2015    PARATHYROIDECTOMY  2006    left parathyroid  implant and parathyroidectomy    TRANSESOPHAGEAL ECHOCARDIOGRAM N/A 2/28/2022    TRANSESOPHAGEAL ECHOCARDIOGRAM WITH BUBBLE STUDY performed by Adriana Renteria MD at [facility]     ·  [address]:    03/11/22 2100 03/12/22 0400 03/12/22 0737 03/12/22 0823   BP: (!) 91/51 (!) 93/51 (!) 85/50 (!) 115/55   Pulse: 78 80 78    Resp: 16 20 19    Temp: 98.8 °F (37.1 °C) 97.7 °F (36.5 °C) 96.5 °F (35.8 °C)    TempSrc: Axillary Axillary Infrared    SpO2: 98%  99%    Weight:       Height:         Physical Exam   Constitutional/General:  NAD in bed   Head: NC/AT  Neck: Supple, full ROM,    Pulmonary: Lungs DEC  to auscultation bilaterally. ON O2   Cardiovascular:  Regular rate and rhythm  Abdomen: Soft, + BS.    distension. Nontender.     Extremities:  Warm and well perfused  Skin: Warm and dry without rash    Neurologic:     AROUSE  MERCHANT      DIAGNOSTIC RESULTS   RADIOLOGY:   ECHO Transesophageal    Result Date: 3/1/2022  Transesophageal Echocardiography Report (CLEMENCIA)   Demographics   Patient Name      Dana Maguire       Gender              Female                    [address] [facility]    [id number]           Room Number         1834  Number   Account #         [de-identified]          Procedure Date      02/28/2022   Corporate ID Ordering Physician  Christen Montelongo MD   Accession Number  [id number]         Referring Physician   Date of Birth     1955         Sonographer         Jack Bhatti                                                           MIKE   Age               73 year(s)         Interpreting        Christen Montelongo MD                                       Physician                                        Any Other  Procedure Type of Study   CLEMENCIA procedure:Transesophageal Echo (CLEMENCIA), Color Flow Velocity Mapping. Procedure Date Date: 02/28/2022 Start: 11:00 AM Study Location: Portable Technical Quality: Adequate visualization Indications:R/O Endocarditis. Patient Status: Routine Height: 62 inches Weight: 220 pounds BSA: 1.99 m^2 BMI: 40.24 kg/m^2 BP: 138/79 mmHg CLEMENCIA Performed By: the attending and the sonographer  Type of Anesthesia: Moderate sedation   Findings   Left Ventricle  Normal LV segmental wall motion. Ejection fraction is visually estimated at 60 to 65%. Right Ventricle  Normal right ventricular size and function. Left Atrium  No evidence of thrombus within left atrium. Right Atrium  Normal right atrium size. Agitated saline injected for shunt evaluation shows no shunt. Mitral Valve  No vegetation. Mild mitral regurgitation is present. Tricuspid Valve  No vegetation. Aortic Valve  No vegetation. The aortic valve appears mildly sclerotic. Pulmonic Valve  No vegetation. Conclusions   Summary  Technically difficult study. No definate evidence of vegetation consistent with endocarditis. Normal LV segmental wall motion. Ejection fraction is visually estimated at 60 to 65%. Normal right ventricular size and function.    Signature   ----------------------------------------------------------------  Electronically signed by Christen Montelongo MD(Interpreting  physician) on 03/01/2022 09:11 AM  ---------------------------------------------------------------- [email or web address] [id number]    CT HEAD WO CONTRAST    Result Date: 2/25/2022  EXAMINATION: CT OF THE HEAD WITHOUT CONTRAST  2/25/2022 3:08 am TECHNIQUE: CT of the head was performed without the administration of intravenous contrast. Dose modulation, iterative reconstruction, and/or weight based adjustment of the mA/kV was utilized to reduce the radiation dose to as low as reasonably achievable. COMPARISON: Correlation with MRI dated 01/16/2022 HISTORY: ORDERING SYSTEM PROVIDED HISTORY: mental status change TECHNOLOGIST PROVIDED HISTORY: Has a \"code stroke\" or \"stroke alert\" been called? ->No Reason for exam:->mental status change Decision Support Exception - unselect if not a suspected or confirmed emergency medical condition->Emergency Medical Condition (MA) FINDINGS: BRAIN/VENTRICLES: There is no acute intracranial hemorrhage, mass effect or midline shift. No abnormal extra-axial fluid collection. The gray-white differentiation is maintained without evidence of an acute infarct. There is no evidence of hydrocephalus. Minimal cortical volume loss. Scattered vascular calcifications. ORBITS: The visualized portion of the orbits demonstrate no acute abnormality. SINUSES: Significant opacification of the left greater than right mastoid air cells similar to previous. Mild mucosal thickening in the left sphenoid sinus. This was also present previously. SOFT TISSUES/SKULL:  No acute abnormality of the visualized skull or soft tissues. No acute intracranial abnormality. MRI would be useful if symptoms persist. Inflammatory changes of left greater than right mastoid air cells and left sphenoid sinus similar to prior MRI.  RECOMMENDATIONS: Unavailable     NM LUNG VENT/PERFUSION (VQ)    Result Date: 2/25/2022  EXAMINATION: NUCLEAR MEDICINE VENTILATION PERFUSION SCAN. 2/25/2022 TECHNIQUE: 3.5 millicuries aerosolized Tc99m DTPA was administered via mask prior to planar imaging of the lungs in multiple projections. Then, 4.2 millicuries of Tc 19P MAA was administered intravenously prior to planar imaging of the lungs in similar projections. COMPARISON: Chest CT February 25, 2022 . HISTORY: ORDERING SYSTEM PROVIDED HISTORY: R/O PE TECHNOLOGIST PROVIDED HISTORY: Reason for exam:->R/O PE What reading provider will be dictating this exam?->CRC FINDINGS: PERFUSION: Distribution of radiotracer is homogeneous. No segmental defects identified. VENTILATION: Ventilation images are unremarkable. CHEST CT: Small infiltrate or atelectasis posterior right lung. Negative for pulmonary embolus. CT ABDOMEN PELVIS W IV CONTRAST Additional Contrast? None    Result Date: 2/25/2022  EXAMINATION: CT OF THE ABDOMEN AND PELVIS WITH CONTRAST 2/25/2022 3:08 am TECHNIQUE: CT of the abdomen and pelvis was performed with the administration of intravenous contrast. Multiplanar reformatted images are provided for review. Dose modulation, iterative reconstruction, and/or weight based adjustment of the mA/kV was utilized to reduce the radiation dose to as low as reasonably achievable. COMPARISON: 01/17/2022 HISTORY: ORDERING SYSTEM PROVIDED HISTORY: Abdominal distention, AMS TECHNOLOGIST PROVIDED HISTORY: Reason for exam:->Abdominal distention, AMS Additional Contrast?->None Decision Support Exception - unselect if not a suspected or confirmed emergency medical condition->Emergency Medical Condition (MA) FINDINGS: Many images are partially degraded by patient motion related artifact. Low-attenuation focus in the central aspect of the left lobe of the liver as well as a tiny low-attenuation focus in the caudate lobe. There is also a tiny low-attenuation focus in the extreme inferior aspect of the right lobe. These are difficult to definitively characterize due to their small size but likely represent small hepatic cysts.   Gallbladder is unremarkable. Spleen is normal in size. No evidence of acute pancreatitis. There is a E small exophytic cystic appearing lesion along the anterior aspect of the body of the pancreas measuring 7 Hounsfield units and 1.4 cm. This is unchanged dating back to 11/17/2021. This is also favored to be incidental but could be followed. No adrenal mass. No hydronephrosis. The native kidneys remains significantly atrophic and contain numerous cystic lesions. Some of these are hyperdense but also unchanged dating back to the 11/17/2021 exam and favored to represent hyperdense or hemorrhagic cysts. Small renal cortical calcifications and or nonobstructing stones. A right lower quadrant renal transplant demonstrates no hydronephrosis. Small hiatal hernia. Mild fluid distention of a few small bowel loops in the upper abdomen. No bowel obstruction. The cecum is mobile. The appendix is not identified with certainty. There is some localized thickening of the proximal ascending colon. This does appear to be new compared to the previous examination. Moderate stool in the distal colon. Residual contrast in the colon likely due to contrast administered on the prior examination. Uterus is atrophic. Urinary bladder is largely decompressed with Chin catheter. Minimal gas in the bladder likely due to the catheter. Partial visualization of femoral to femoral bypass graft. Degenerative changes are present in the spine and pelvis. Please see separate dictated report for CT of the chest.     Thickening of the proximal ascending colon. Given the short-term change, this is favored to represent a localized area of colitis likely infectious or inflammatory. Neoplasia thought less likely but follow-up to resolution would be recommended. Moderate stool in the distal colon. Minimal distention of several proximal small bowel loops likely incidental or due to mild enteritis. Small hiatal hernia. Other chronic appearing findings. RECOMMENDATIONS: Unavailable     IR GUIDED IVC FILTER PLACEMENT    Result Date: 2/26/2022  EXAMINATION: INFERIOR VENA CAVA (IVC) FILTER INSERTION 2/26/2022 Procedural Personnel: Layne Nam MD HISTORY: Indication: Bleeding with IV heparin ORDERING SYSTEM PROVIDED HISTORY: IVC filter TECHNOLOGIST PROVIDED HISTORY: Reason for exam:->IVC filter Anticoagulation contraindicated SEDATION: None CONTRAST: Contrast agent: Isovue 320 Contrast volume: 30mL FLUOROSCOPY DOSE AND TYPE OR TIME AND EXPOSURES: Fluoroscopy time/Number of Images: Fluoroscopy time 1.5 minutes. Reference [facility] [place] kerma: [id number] PROCEDURE: Informed consent for the procedure including risks, benefits and alternatives was obtained and time-out was performed prior to the procedure. Universal protocol was followed. A suitable skin site was prepared and draped using all elements of maximal sterile barrier technique including sterile gloves, sterile gown, cap, mask, large sterile sheet, sterile ultrasound probe cover, hand hygiene, and cutaneous antisepsis. Time-out was called and the patient's order and identity were verified. Local anesthesia was administered. The vessel was sonographically evaluated and judged appropriate for access. Real time ultrasound was used to visualize needle entry into the vessel and an ultrasound image was stored in PACS. Vein accessed: Right common femoral vein. Access technique: Micropuncture set with 21 gauge needle A 0.035 guide wire was used to place a catheter into the inferior vena cava. A vena cavogram was performed. A Bard Rusk filter was deployed in routine fashion in the infrarenal IVC under fluoroscopic guidance. The sheath was removed and hemostasis was achieved with manual compression. A sterile dressing was applied. Patient tolerated procedure well. Complications: No immediate complications. Standardized report: [id number] [id number] FINDINGS: US: The access vein is patent.  Inferior Vena Cavogram: The vena cava is patent and normal in size without thrombus or anomalies. Post-placement imaging: Spot filmdemonstrates the filter in the infrarenalvena cava with less than 15 degrees tilt from the vena cava access. Successful vena cava filter placement. PLAN: Retrieval intent (MIPS): The filter is potentially retrievable. Plan/Timing For Retrieval: Ordering Physician/Contact For Retrieval Plan: Jose Luis Noriega     XR CHEST PORTABLE    Result Date: 3/9/2022  EXAMINATION: ONE XRAY VIEW OF THE CHEST 3/9/2022 10:28 am COMPARISON: 02/25/2022 HISTORY: ORDERING SYSTEM PROVIDED HISTORY: ams TECHNOLOGIST PROVIDED HISTORY: Reason for exam:->ams FINDINGS: The cardiac silhouette is at upper limits of normal in size. There are no findings of failure There is a linear focus seen within the right perihilar region favored to represent atelectasis. There is no focal consolidation seen within the right or left lung to suggest pneumonia. There is no pleural effusion. 1. Right perihilar discoid atelectasis. 2. There are no findings of failure or pneumonia. XR CHEST 1 VIEW    Result Date: 2/25/2022  EXAMINATION: ONE XRAY VIEW OF THE CHEST 2/25/2022 2:49 pm COMPARISON: None. HISTORY: ORDERING SYSTEM PROVIDED HISTORY: needs to be done with VQ scan TECHNOLOGIST PROVIDED HISTORY: Last chest Xray done more than 24 hours ago Reason for exam:->needs to be done with VQ scan FINDINGS: The heart is mildly enlarged. There are no findings of failure. The lungs are clear. There is no focal infiltrate or effusion. .     1. Mild cardiomegaly. There is no evidence of failure or pneumonia. CTA PULMONARY W CONTRAST    Result Date: 2/25/2022  EXAMINATION: CTA OF THE CHEST 2/25/2022 3:08 am TECHNIQUE: CTA of the chest was performed after the administration of intravenous contrast.  Multiplanar reformatted images are provided for review. MIP images are provided for review.  Dose modulation, iterative reconstruction, and/or weight based adjustment of the mA/kV was utilized to reduce the radiation dose to as low as reasonably achievable. COMPARISON: Portable chest dated 01/22/2022 and CT dated 01/17/2022 HISTORY: ORDERING SYSTEM PROVIDED HISTORY: Hypoxic, SOB TECHNOLOGIST PROVIDED HISTORY: Reason for exam:->Hypoxic, SOB Decision Support Exception - unselect if not a suspected or confirmed emergency medical condition->Emergency Medical Condition (MA) FINDINGS: Pulmonary Arteries: Assessment is limited due to patient motion artifact. A small or peripheral embolism would be difficult to exclude but no large or central embolism identified. Mediastinum: Moderately severe cardiomegaly is similar to previous. No pericardial effusion. No aortic dissection. Scattered vascular calcifications. Normal-size lymph nodes without adenopathy by size criteria. Lungs/pleura: No pneumothorax. Mild atelectasis in the right greater than left lung base. Otherwise, there has been significant improvement in aeration of the lungs since previous. Upper Abdomen: Partial visualization of the upper right kidney with cystic appearing foci as well as a partially visualized hyperdense lesion likely representing a hyperdense proteinaceous cyst and similar to previous but continued follow-up would be useful. Visualized portions of the upper abdomen demonstrate no acute abnormality. Small hiatal hernia. Soft Tissues/Bones: Degenerative changes are scattered in the spine. Allowing for patient motion artifact, no identified pulmonary embolism. Significant improvement in aeration of the lungs with some residual areas of presumed atelectasis in the right greater than left base. Cardiomegaly.  RECOMMENDATIONS: Unavailable     US DUP UPPER EXTREMITY LEFT VENOUS    Result Date: 2/27/2022  EXAMINATION: VENOUS ULTRASOUND OF THE LEFT UPPER EXTREMITY, 2/27/2022 12:55 pm TECHNIQUE: Duplex ultrasound using B-mode/gray scaled imaging and Doppler spectral analysis and color flow was obtained of the deep venous structures of the left upper extremity. COMPARISON: None. HISTORY: ORDERING SYSTEM PROVIDED HISTORY: left arm edema TECHNOLOGIST PROVIDED HISTORY: Portable please if possible Reason for exam:->left arm edema What reading provider will be dictating this exam?->CRC FINDINGS: There is normal flow and compressibility of the visualized venous structures. There is no evidence of echogenic thrombus. The veins demonstrate good compressibility with normal color flow study and spectral analysis. The AV fistula is patent. No evidence of DVT. IR ULTRASOUND GUIDANCE VASCULAR ACCESS    Result Date: 2/26/2022  EXAMINATION: INFERIOR VENA CAVA (IVC) FILTER INSERTION 2/26/2022 Procedural Personnel: Antonio Ortez MD HISTORY: Indication: Bleeding with IV heparin ORDERING SYSTEM PROVIDED HISTORY: IVC filter TECHNOLOGIST PROVIDED HISTORY: Reason for exam:->IVC filter Anticoagulation contraindicated SEDATION: None CONTRAST: Contrast agent: Isovue 320 Contrast volume: 30mL FLUOROSCOPY DOSE AND TYPE OR TIME AND EXPOSURES: Fluoroscopy time/Number of Images: Fluoroscopy time 1.5 minutes. Reference [facility] [place] kerma: [id number] PROCEDURE: Informed consent for the procedure including risks, benefits and alternatives was obtained and time-out was performed prior to the procedure. Universal protocol was followed. A suitable skin site was prepared and draped using all elements of maximal sterile barrier technique including sterile gloves, sterile gown, cap, mask, large sterile sheet, sterile ultrasound probe cover, hand hygiene, and cutaneous antisepsis. Time-out was called and the patient's order and identity were verified. Local anesthesia was administered. The vessel was sonographically evaluated and judged appropriate for access. Real time ultrasound was used to visualize needle entry into the vessel and an ultrasound image was stored in PACS. Vein accessed: Right common femoral vein.  Access technique: Micropuncture set with 21 gauge needle A 0.035 guide wire was used to place a catheter into the inferior vena cava. A vena cavogram was performed. A Bard Davidson filter was deployed in routine fashion in the infrarenal IVC under fluoroscopic guidance. The sheath was removed and hemostasis was achieved with manual compression. A sterile dressing was applied. Patient tolerated procedure well. Complications: No immediate complications. Standardized report: [id number] [id number] FINDINGS: US: The access vein is patent. Inferior Vena Cavogram: The vena cava is patent and normal in size without thrombus or anomalies. Post-placement imaging: Spot filmdemonstrates the filter in the infrarenalvena cava with less than 15 degrees tilt from the vena cava access. Successful vena cava filter placement. PLAN: Retrieval intent (MIPS): The filter is potentially retrievable. Plan/Timing For Retrieval: Ordering Physician/Contact For Retrieval Plan: Lopez GUERRIER MODIFIED BARIUM SWALLOW W VIDEO    Result Date: 2/26/2022  EXAMINATION: MODIFIED BARIUM SWALLOW WAS PERFORMED IN CONJUNCTION WITH SPEECH PATHOLOGY SERVICES TECHNIQUE: Fluoroscopic evaluation of the swallowing mechanism was performed using cineradiography with multiple consistency of barium product in conjunction with speech pathology services. FLUOROSCOPY DOSE AND TYPE OR TIME AND EXPOSURES: Fluoroscopy time 2.1 minutes. Air kerma 5.49 mGy COMPARISON: None HISTORY: ORDERING SYSTEM PROVIDED HISTORY: dysphagia, aspriation pna? TECHNOLOGIST PROVIDED HISTORY: Reason for exam:->dysphagia, aspriation pna? FINDINGS: There was oral delay and pharyngeal delay. Laryngeal elevation was adequate. There was retention in the piriform sinuses but not the vallecula. Transient laryngeal penetration is seen with thin liquid barium. Otherwise, no aspiration or laryngeal penetration is seen.      Strands it laryngeal penetration with thin liquid barium. No evidence of aspiration. Please see separate speech pathology report for full discussion of findings and recommendations. RECOMMENDATIONS: Unavailable     US DUP LOWER EXTREMITIES BILATERAL VENOUS    Result Date: 2/25/2022  EXAMINATION: DUPLEX VENOUS ULTRASOUND OF THE BILATERAL LOWER EXTREMITIES2/25/2022 2:47 pm TECHNIQUE: Duplex ultrasound using B-mode/gray scaled imaging, Doppler spectral analysis and color flow Doppler was obtained of the deep venous structures of the lower bilateral extremities. COMPARISON: None. HISTORY: ORDERING SYSTEM PROVIDED HISTORY: R/O DVT TECHNOLOGIST PROVIDED HISTORY: Reason for exam:->R/O DVT What reading provider will be dictating this exam?->CRC FINDINGS: Right lower extremity:  The visualized veins of the bilateral lower extremities are patent and free of echogenic thrombus. The veins demonstrate good compressibility with normal color flow study and spectral analysis. Left lower extremity: There are incompletely occlusive thrombi within the mid and distal left femoral vein and the popliteal vein. They have developed in the interim since the prior lower extremity Doppler from 11/17/2021.     1. INCOMPLETELY OCCLUSIVE THROMBI in the mid and distal LEFT femoral vein and within the popliteal vein. They have developed in the interim since the previous study from 11/17/2021. 2. No evidence of DVT in the right lower extremity.  RECOMMENDATIONS: Unavailable     LABS  Recent Labs     03/10/22  1329 03/11/22  0827 03/12/22  0400   WBC 20.0* 13.0* 8.0   HGB 8.6* 8.5* 7.4*   HCT 28.7* 30.5* 26.0*   .5* 111.3* 110.6*    144 140     Recent Labs     03/10/22  0549 03/10/22  0549 03/11/22  0827 03/11/22  0827 03/12/22  0400     --  143  --  143   K 4.9   < > 4.2   < > 3.7   *   < > 108*   < > 108*   CO2 21*   < > 23   < > 22   BUN 38*  --  43*  --  47*   CREATININE 1.7*  --  2.2*  --  2.5*   GFRAA 36  --  27  --  23   LABGLOM 30  --  22  --  19   GLUCOSE 75  -- 75  --  89   PROT 4.9*  --  4.8*  --  4.1*   LABALBU 2.0*  --  2.1*  --  2.0*   CALCIUM 9.9  --  9.3  --  8.8   BILITOT 0.6  --  0.4  --  0.4   ALKPHOS 101  --  95  --  59   AST 18  --  15  --  12   ALT 9  --  6  --  9    < > = values in this interval not displayed. Recent Labs     03/10/22  1329   PROCAL 0.33*     Lab Results   Component Value Date    CRP 18.3 (H) 01/23/2022    CRP 34.6 (H) 11/17/2021     Lab Results   Component Value Date    SEDRATE 61 (H) 01/23/2022    SEDRATE 62 (H) 11/20/2021     No results found for: [id number]  Lab Results   Component Value Date    COVID19 Not Detected 03/02/2022    COVID19 Not Detected 02/25/2022     COVID-19/MARINA-COV2 LABS  Recent Labs     03/10/22  0549 03/10/22  1329 03/11/22  0827 03/12/22  0400   PROCAL  --  0.33*  --   --    AST 18  --  15 12   ALT 9  --  6 9      MICROBIOLOGY:          Lab Results   Component Value Date    BC 24 Hours no growth 03/09/2022    BC 5 Days no growth 02/27/2022    BC  02/25/2022     This organism was isolated in one set. Susceptibility testing is not routinely done as this  organism frequently represents skin contamination. Additional testing can be ordered by calling the  Microbiology Department.       ORG Pseudomonas aeruginosa 03/09/2022    ORG Staphylococcus coagulase-negative 02/25/2022    ORG Enterococcus faecalis 01/12/2022     Lab Results   Component Value Date    BLOODCULT2 24 Hours no growth 03/09/2022    BLOODCULT2 5 Days no growth 02/25/2022    BLOODCULT2 5 Days no growth 01/24/2022    ORG Pseudomonas aeruginosa 03/09/2022    ORG Staphylococcus coagulase-negative 02/25/2022    ORG Enterococcus faecalis 01/12/2022       WOUND/ABSCESS   Date Value Ref Range Status   01/24/2022 Growth not present  Final        Urine Culture, Routine   Date Value Ref Range Status   03/09/2022 75,000 CFU/ml  Final   02/26/2022 Growth not present  Final   01/23/2022 Growth not present  Final     MRSA Culture Only   Date Value Ref Range Status 11/17/2021 Methicillin resistant Staph aureus not isolated  Final          FINAL IMPRESSION    Patient is a 73 y.o. female who presented with   Chief Complaint   Patient presents with    Altered Mental Status     from NH    and admitted for Confusion [R41.0]  Acute respiratory failure with hypoxia ([address] [J96.01]  Acute respiratory failure with hypoxemia ([facility]) [J96.01]     PT WITH ENCEPHALOPTHAY METABOLIC VS INFECTIOUS ETIOLOGY   H/O hsv IG G +  S/P RX ASPIRATION PNEUMONIA/COLITIS  H/O COVID  KIDNEY TRANSPLANT ON IMMUNOSUPPRESSIVE  Poss UTI Pseudomonas   MASTOIDITIS/SINUSITIS  CIDFF +    CHECK BLOOD CX ngtd  CDIFF RX   F/U LABS AND CULTURES  ACYCLOVIR     follow labs cr         Fidaxomicin (DIFICID) tablet 200 mg, BID  levoFLOXacin (LEVAQUIN) 750 MG/150ML infusion 750 mg, Q48H  acyclovir (ZOVIRAX) 350 mg in dextrose 5 % 50 mL IVPB, Q8H       Imaging and labs were reviewed per medical records      Thank you for involving me in the care of Rose Retana. Please do not hesitate to call for any questions or concerns.          Electronically signed by Magalis Elizabeth MD on 3/12/2022 at 10:14 AM

## 2022-03-12 NOTE — CONSULTS
[facility] Jaime Shelby, MD Dolores Montenegro MD Nan Flight, DO, [address] .  Consultation  Patient's Name: Agustin Escalante  3:05 PM  3/12/2022    Nephrologist: Milad Demarco MD    Reason for Consult:  Acute kidney inury/chronic kidney disease   Requesting Physician:  Bettie Umaña MD    History of Present Ilness:      Patient has h/o preserved left ventricular systolic function (CLEMENCIA  4/42/9309), stress test with no significant reversibility 6/16/2015, paroxysmal atrial fibrillation (on Eliquis), chronic left bundle branch block, HTN, HLD,  s/p kidney transplant,CKD, H/o DVT, s/p IVC on AC now, PVD, s/p thyroidectomy for cancer, gout, obesity     She was recently treated in hospitalfrom 2/25/2020 to 3/7/2022 because of acute hypoxic respiratory failure likely due to aspiration pneumonia with positive blood culture and acute metabolic encephalopathy     Patient was brought from [address] [place] [address] and rehab facility to ED of [address] [place] [address] on 3/9/22 due to new mental status changes. Nephrology asked to see for CECILY /CKD   Pt has hx of cadaveric kidney transplant 7 years back   Her baseline cr has been 1.2-1.6 with a lot of fluctuation in contest of dehydration . Pt seen in her room , she is very weak and deconditoned to my eyes she has lost a lot of weight .    She has a hassan in place   She has poor po intake   She is euvolemic to mildly hypovolemic       Past Medical History:   Diagnosis Date    Abnormal EKG     left bundle branch block    Acute gout involving toe of right foot 09/03/2020    Acute on chronic combined systolic (congestive) and diastolic (congestive) heart failure (HCC)     Cancer (HCC)     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    Cerebrovascular disease     Clotting disorder ([address] 1985    thrombophlebitis after c section delivery    Depression     15 years followed by dr Sahil Ha Hemod[facility] patient [facility])     History of blood transfusion     Hyperlipidemia     Hypertension     Hypothyroidism     Leg swelling 09/03/2020    Lymphedema of both lower extremities 09/03/2020    Peripheral vascular disease ([address]     Renal failure 11/2012    renal transplant    Thrombophlebitis     after c section delivery    Thyroid disease        Past Surgical History:   Procedure Laterality Date   [address]    one normal delivery    COLECTOMY N/A 1/15/2022    DIAGNOSTIC  LAPAROSCOPY LYSIS OF ADHESIONS performed by Ryan Murry MD at [address] [place] [address] Drive CATH LAB PROCEDURE  2006    normal coronaries and 1996 normal coronaries    DIALYSIS FISTULA CREATION  march and july 2012    phase one and two patient will start dialysis when needed   [address]    transfemoral venous bypass grafts    IR IVC FILTER PLACEMENT W IMAGING  2/26/2022    IR IVC FILTER PLACEMENT W IMAGING 2/26/2022 [facility] SPECIAL PROCEDURES    KIDNEY TRANSPLANT  2016    KIDNEY TRANSPLANT  2015    KIDNEY TRANSPLANT  2015    PARATHYROIDECTOMY  2006    left parathyroid  implant and parathyroidectomy    TRANSESOPHAGEAL ECHOCARDIOGRAM N/A 2/28/2022    TRANSESOPHAGEAL ECHOCARDIOGRAM WITH BUBBLE STUDY performed by Ivan Gallo MD at [address] [place] [address] History   Problem Relation Age of Onset    Diabetes Mother     Diabetes Father     Dementia Father         reports that she quit smoking about 6 years ago. She has a 20.00 pack-year smoking history. She has never used smokeless tobacco. She reports that she does not drink alcohol and does not use drugs.     Allergies:  Macrodantin [nitrofurantoin macrocrystal] and Sulfa antibiotics    Current Medications:    lidocaine 1 % injection 5 mL, Once  sodium chloride flush 0.9 % injection 5-40 mL, 2 times per day  sodium chloride flush 0.9 % injection 5-40 mL, PRN  0.9 % sodium chloride infusion, PRN  heparin flush 100 UNIT/ML injection 100 Units, 2 times per day  heparin flush 100 UNIT/ML injection 100 Units, PRN  metoprolol tartrate (LOPRESSOR) tablet 12.5 mg, BID  Fidaxomicin (DIFICID) tablet 200 mg, BID  levoFLOXacin (LEVAQUIN) 750 MG/150ML infusion 750 mg, Q48H  acyclovir (ZOVIRAX) 350 mg in dextrose 5 % 50 mL IVPB, Q8H  0.9 % sodium chloride infusion, Continuous  apixaban (ELIQUIS) tablet 5 mg, BID  desvenlafaxine succinate (PRISTIQ) extended release tablet 50 mg, Daily  ondansetron (ZOFRAN-ODT) disintegrating tablet 4 mg, Q8H PRN  atorvastatin (LIPITOR) tablet 10 mg, Nightly  cloZAPine (CLOZARIL) tablet 50 mg, Nightly  [Held by provider] lisinopril (PRINIVIL;ZESTRIL) tablet 5 mg, Daily  levothyroxine (SYNTHROID) tablet 50 mcg, Daily  pantoprazole (PROTONIX) tablet 40 mg, QAM AC  docusate sodium (COLACE) capsule 100 mg, BID  cycloSPORINE (SANDIMMUNE) capsule 100 mg, BID  mycophenolate (CELLCEPT) capsule 500 mg, BID  predniSONE (DELTASONE) tablet 5 mg, Daily  allopurinol (ZYLOPRIM) tablet 839 mg, Daily  folic acid (FOLVITE) tablet 1 mg, Daily        Review of Systems:   Constitutional: weakness   Eyes: no eye pain , no itching , no drainage  Ears, nose, mouth, throat, and face: no ear ,nose pain , hearing is ok ,no nasal drainage   Respiratory: no sob ,no cough ,no wheezing . Cardiovascular: no chest pain , no palpitation ,no sob . Gastrointestinal: no nausea, vomiting , constipation , no abdominal pain . Genitourinary:no urinary retention , no burning , dysuria . No polyuria   Hematologic/lymphatic: no bleeding , no cougulation issues . Musculoskeletal:no joint pain , no swelling . Neurological: no headaches ,no weakness , no numbness . Endocrine: no thirst , no weight issues . Physical exam:   Vital signs BP (!) 115/55   Pulse 78   Temp 96.5 °F (35.8 °C) (Infrared)   Resp 19   Ht 5' 2\" (1.575 m)   Wt 220 lb (99.8 kg)   SpO2 99%   BMI 40.24 kg/m²   Gen : NAD , appropriate for stated age .    Head : at , nc   Neck : supple , no thyromegaly noted .   Eyes : EOMI , PERRLA   CV : RRR , No M/R/G . Lungs: CTAB , no wheezing , good flow heard b/l   Abd : soft , NT , BS + , No Organomegaly appreciated . Skin : soft, dry . Neuro : CN  II-XII grossly intact , no focal neurologic deficit . Psych : cooperative .      Data:   Labs:  CBC with Differential:    Lab Results   Component Value Date    WBC 8.0 03/12/2022    RBC 2.35 03/12/2022    HGB 7.4 03/12/2022    HCT 26.0 03/12/2022     03/12/2022    .6 03/12/2022    MCH 31.5 03/12/2022    MCHC 28.5 03/12/2022    RDW 15.9 03/12/2022    NRBC 1.0 01/13/2022    METASPCT 1.0 03/09/2022    LYMPHOPCT 9.0 03/12/2022    MONOPCT 10.3 03/12/2022    MYELOPCT 1.0 03/11/2022    BASOPCT 0.4 03/12/2022    MONOSABS 0.82 03/12/2022    LYMPHSABS 0.72 03/12/2022    EOSABS 0.10 03/12/2022    BASOSABS 0.03 03/12/2022     CMP:    Lab Results   Component Value Date     03/12/2022    K 3.7 03/12/2022    K 4.7 03/09/2022     03/12/2022    CO2 22 03/12/2022    BUN 47 03/12/2022    CREATININE 2.5 03/12/2022    GFRAA 23 03/12/2022    LABGLOM 19 03/12/2022    GLUCOSE 89 03/12/2022    PROT 4.1 03/12/2022    LABALBU 2.0 03/12/2022    CALCIUM 8.8 03/12/2022    BILITOT 0.4 03/12/2022    ALKPHOS 59 03/12/2022    AST 12 03/12/2022    ALT 9 03/12/2022     Ionized Calcium:  No results found for: IONCA  Magnesium:    Lab Results   Component Value Date    MG 1.9 03/01/2022     Phosphorus:    Lab Results   Component Value Date    PHOS 2.9 03/01/2022     U/A:    Lab Results   Component Value Date    COLORU Yellow 03/09/2022    PHUR 5.0 03/09/2022    WBCUA 1-3 03/09/2022    RBCUA NONE 03/09/2022    RBCUA 10-20 03/12/2014    YEAST Present 04/09/2021    BACTERIA MODERATE 03/09/2022    CLARITYU Clear 03/09/2022    SPECGRAV 1.025 03/09/2022    LEUKOCYTESUR Negative 03/09/2022    UROBILINOGEN 0.2 03/09/2022    BILIRUBINUR Negative 03/09/2022    BLOODU Negative 03/09/2022    GLUCOSEU Negative 03/09/2022 Microalbumen/Creatinine ratio:  No components found for: RUCREAT  Iron Saturation:  No components found for: PERCENTFE  TIBC:    Lab Results   Component Value Date    TIBC 135 01/19/2022     FERRITIN:    Lab Results   Component Value Date    FERRITIN 4,062 01/23/2022        Imaging:  IR FLUORO GUIDED CVA DEVICE PLMT/REPLACE/REMOVAL   Final Result   Successful placement of a dual lumen power PICC line. The tip of the PICC   line catheter was placed within the distal right subclavian vein. Rosario Dye IMPRESSION:   Successful ultrasound and fluoroscopy guided PICC placement         IR ULTRASOUND GUIDANCE VASCULAR ACCESS   Final Result   Successful placement of a dual lumen power PICC line. The tip of the PICC   line catheter was placed within the distal right subclavian vein. Rosario Dye IMPRESSION:   Successful ultrasound and fluoroscopy guided PICC placement         CT HEAD WO CONTRAST   Final Result   No acute intracranial abnormality or hemorrhage. Bilateral mastoiditis and right sphenoid sinusitis. XR SHOULDER LEFT (MIN 2 VIEWS)   Final Result   No fracture. Increased separation of the humeral head from the osseous   glenoid and AC joint, which could represent subluxation. Otherwise, a large   synovial effusion, hematoma, or mass cannot be excluded. Clinical   correlation is recommended. CT scan of the left shoulder can be performed   for further evaluation. Cardiomegaly. XR HUMERUS LEFT (MIN 2 VIEWS)   Final Result   No fracture or dislocation. Increased separation of the humeral head from   the osseous glenoid and AC joint, which could represent subluxation. A large   synovial effusion, hematoma, or mass cannot be excluded. CT scan of the left   shoulder can be considered for further evaluation. XR CHEST PORTABLE   Final Result   1. Right perihilar discoid atelectasis. 2. There are no findings of failure or pneumonia.              Assessment    -Acute kidney injury -Hx of  donor kidney transplant :  -Immunosuppression host   -Anaemia of chronic disease   -severe deconditioning     Plan:    Baseline cr 1.1-1.6 with a lot of fluctuation .    Clinically euvolemic to hypovolemic     Continue NS at 100 cc/hr   Check urine lytes (na ,cl , cr )  Check urine eosinophills   Check cyclosporin trough in am   Check US of the  Transplant kidney   Bmp , cbc ,phosph in am         Electronically signed by Kathie Hogan MD on 3/12/2022 at 3:05 PM

## 2022-03-12 NOTE — PROGRESS NOTES
Progress Note  Date:3/11/2022       Room:[facility]0415-  Patient Deepti Garcia     YOB: 1955     Age:66 y.o. Subjective    Subjective:  Symptoms:  Stable. (Feels tired ). Diet:  Dietary issues: was NPO this morning , feels hungry     Activity level: Impaired due to weakness. Review of Systems  Objective         Vitals Last 24 Hours:  TEMPERATURE:  Temp  Av °F (36.7 °C)  Min: 97.9 °F (36.6 °C)  Max: 98 °F (36.7 °C)  RESPIRATIONS RANGE: Resp  Av  Min: 18  Max: 18  PULSE OXIMETRY RANGE: SpO2  Av.7 %  Min: 91 %  Max: 98 %  PULSE RANGE: Pulse  Av.7  Min: 80  Max: 84  BLOOD PRESSURE RANGE: Systolic (78SKB), TLV:76 , Min:81 , VPS:813   ; Diastolic (06YJG), VWX:30, Min:39, Max:68    I/O (24Hr): Intake/Output Summary (Last 24 hours) at 3/11/2022 2021  Last data filed at 3/11/2022 1851  Gross per 24 hour   Intake 1099.54 ml   Output 175 ml   Net 924.54 ml     Objective:  General Appearance:  Comfortable and in no acute distress. Vital signs: (most recent): Blood pressure (!) 91/48, pulse 84, temperature 98 °F (36.7 °C), temperature source Oral, resp. rate 18, height 5' 2\" (1.575 m), weight 220 lb (99.8 kg), SpO2 98 %. (Hypotension). Output: Producing urine. HEENT: Normal HEENT exam.    Lungs:  Normal effort and normal respiratory rate. There are decreased breath sounds. Heart: Normal rate. Regular rhythm. Abdomen: Abdomen is soft and distended. Bowel sounds are normal.   There is no abdominal tenderness. Extremities: (Edema +1 in the left leg )  Pulses: Distal pulses are intact. Neurological: Patient is alert and oriented to person, place and time. Pupils:  Pupils are equal, round, and reactive to light.       Labs/Imaging/Diagnostics    Labs:  CBC:  Recent Labs     22  1032 03/10/22  1329 22  0827   WBC 24.7* 20.0* 13.0*   RBC 3.42* 2.67* 2.74*   HGB 10.8* 8.6* 8.5*   HCT 37.4 28.7* 30.5*   .4* 107.5* 111.3*   RDW 16.3* 16.1* 15.9*    145 144     CHEMISTRIES:  Recent Labs     03/09/22  1032 03/10/22  0549 03/11/22  0827    143 143   K 4.7 4.9 4.2    108* 108*   CO2 22 21* 23   BUN 30* 38* 43*   CREATININE 1.6* 1.7* 2.2*   GLUCOSE 96 75 75     PT/INR:No results for input(s): PROTIME, INR in the last 72 hours. APTT:No results for input(s): APTT in the last 72 hours. LIVER PROFILE:  Recent Labs     03/09/22  1032 03/10/22  0549 03/11/22  0827   AST 13 18 15   ALT 7 9 6   BILITOT 0.6 0.6 0.4   ALKPHOS 114* 101 95       Imaging Last 24 Hours:  XR HUMERUS LEFT (MIN 2 VIEWS)    Result Date: 3/10/2022  EXAMINATION: TWO XRAY VIEWS OF THE LEFT HUMERUS 3/10/2022 9:31 am COMPARISON: None. HISTORY: ORDERING SYSTEM PROVIDED HISTORY: limited mobility TECHNOLOGIST PROVIDED HISTORY: Reason for exam:->limited mobility FINDINGS: There is no evidence of fracture or dislocation. No significant osteo-degenerative changes or other osseous abnormalities are identified. There is increased separation of the humeral head from the osseous glenoid and AC joint. This could represent subluxation. The presence of a large synovial effusion, hematoma, or mass cannot be excluded. CT scan of the left shoulder can be considered for further evaluation. Surgical clips are identified in the soft tissues about the mid to distal humerus. No fracture or dislocation. Increased separation of the humeral head from the osseous glenoid and AC joint, which could represent subluxation. A large synovial effusion, hematoma, or mass cannot be excluded. CT scan of the left shoulder can be considered for further evaluation.      CT HEAD WO CONTRAST    Result Date: 3/10/2022  EXAMINATION: CT OF THE HEAD WITHOUT CONTRAST  3/10/2022 3:29 pm TECHNIQUE: CT of the head was performed without the administration of intravenous contrast. Dose modulation, iterative reconstruction, and/or weight based adjustment of the mA/kV was utilized to reduce the radiation dose to as low as reasonably achievable. COMPARISON: February 25, 2022 HISTORY: ORDERING SYSTEM PROVIDED HISTORY: AMS TECHNOLOGIST PROVIDED HISTORY: Reason for exam:->AMS Has a \"code stroke\" or \"stroke alert\" been called? ->No FINDINGS: BRAIN/VENTRICLES: There are age related cortical atrophy and periventricular white matter ischemic changes. There is no acute intracranial hemorrhage, mass effect or midline shift. No abnormal extra-axial fluid collection. The gray-white differentiation is maintained without evidence of an acute infarct. There is no evidence of hydrocephalus. ORBITS: The visualized portion of the orbits demonstrate no acute abnormality. SINUSES: There is partial opacification of bilateral mastoid air cells. Mucosal thickening in the right sphenoid sinus. SOFT TISSUES/SKULL:  No acute abnormality of the visualized skull or soft tissues. No acute intracranial abnormality or hemorrhage. Bilateral mastoiditis and right sphenoid sinusitis. IR FLUORO GUIDED CVA DEVICE PLMT/REPLACE/REMOVAL    Result Date: 3/11/2022  PROCEDURE: ULTRASOUND GUIDED VASCULAR ACCESS. FLUOROSCOPY GUIDED PICC PLACEMENT 3/11/2022. HISTORY: ORDERING SYSTEM PROVIDED HISTORY: picc line/ medline TECHNOLOGIST PROVIDED HISTORY: Reason for exam:->picc line/ medline SEDATION: None FLUOROSCOPY DOSE AND TYPE OR TIME AND EXPOSURES: Fluoroscopy time equals 8.2 minutes. Total dose equals 116 mGy TECHNIQUE: The procedure was performed under ultrasound and fluoroscopic guidance. And ultrasound image and fluoroscopic image was obtained for medical records. Rachell Salazar FINDINGS: The patient's right arm was prepped and draped in a sterile fashion using a maximum sterile barrier technique. Following the uneventful administration of lidocaine I introduced a micro puncture needle into the right basilic vein using ultrasound guidance. Through the micropuncture needle a microwire was introduced into the vein.  Over the microwire a peel-away sheath was placed within the vein. A catheter measurement was obtained. The catheter was then trimmed to 25 cm and introduced over the wire into the right basilic vein. The catheter tip was placed within the right subclavian vein. .  The peel-away sheath was then removed and the PICC line was secured to the skin. A sterile dressing was applied. The patient tolerated the procedure well and no complications. A fluoroscopic image was obtained which confirms position of the PICC line catheter. Successful placement of a dual lumen power PICC line. The tip of the PICC line catheter was placed within the distal right subclavian vein. Jessica Madison IMPRESSION: Successful ultrasound and fluoroscopy guided PICC placement     XR SHOULDER LEFT (MIN 2 VIEWS)    Result Date: 3/10/2022  EXAMINATION: THREE XRAY VIEWS OF THE LEFT SHOULDER 3/10/2022 9:31 am COMPARISON: None HISTORY: ORDERING SYSTEM PROVIDED HISTORY: limited mobility and bruising TECHNOLOGIST PROVIDED HISTORY: Reason for exam:->limited mobility and bruising FINDINGS: There is no evidence of fracture. There is increased separation of the humeral head from the osseous glenoid and the acromioclavicular joint, which could represent subluxation. Otherwise, the presence of a large synovial effusion, hematoma, or mass cannot be excluded. Clinical correlation is recommended. CT scan of the left shoulder can be performed for further evaluation. The heart is enlarged. Surgical clips are identified in the soft tissues surrounding the midportion of the humerus. No fracture. Increased separation of the humeral head from the osseous glenoid and AC joint, which could represent subluxation. Otherwise, a large synovial effusion, hematoma, or mass cannot be excluded. Clinical correlation is recommended. CT scan of the left shoulder can be performed for further evaluation. Cardiomegaly.      IR ULTRASOUND GUIDANCE VASCULAR ACCESS    Result Date: 3/11/2022  PROCEDURE: ULTRASOUND GUIDED VASCULAR ACCESS. FLUOROSCOPY GUIDED PICC PLACEMENT 3/11/2022. HISTORY: ORDERING SYSTEM PROVIDED HISTORY: picc line/ medline TECHNOLOGIST PROVIDED HISTORY: Reason for exam:->picc line/ medline SEDATION: None FLUOROSCOPY DOSE AND TYPE OR TIME AND EXPOSURES: Fluoroscopy time equals 8.2 minutes. Total dose equals 116 mGy TECHNIQUE: The procedure was performed under ultrasound and fluoroscopic guidance. And ultrasound image and fluoroscopic image was obtained for medical records. Leti Mcclellan FINDINGS: The patient's right arm was prepped and draped in a sterile fashion using a maximum sterile barrier technique. Following the uneventful administration of lidocaine I introduced a micro puncture needle into the right basilic vein using ultrasound guidance. Through the micropuncture needle a microwire was introduced into the vein. Over the microwire a peel-away sheath was placed within the vein. A catheter measurement was obtained. The catheter was then trimmed to 25 cm and introduced over the wire into the right basilic vein. The catheter tip was placed within the right subclavian vein. .  The peel-away sheath was then removed and the PICC line was secured to the skin. A sterile dressing was applied. The patient tolerated the procedure well and no complications. A fluoroscopic image was obtained which confirms position of the PICC line catheter. Successful placement of a dual lumen power PICC line. The tip of the PICC line catheter was placed within the distal right subclavian vein. Leti Mcclellan  IMPRESSION: Successful ultrasound and fluoroscopy guided PICC placement     Assessment//Plan           Hospital Problems           Last Modified POA    * (Principal) Acute respiratory failure with hypoxia ([address] 3/9/2022 Yes    Kidney transplanted 3/10/2022 Yes    Elevated troponin 3/10/2022 Yes    Paroxysmal atrial fibrillation ([address] 3/10/2022 Yes    Hypotension 3/10/2022 Yes    LBBB (left bundle branch block) 3/10/2022 Yes    History of DVT (deep vein thrombosis) 3/10/2022 Yes    Chronic anticoagulation 3/10/2022 Yes    Acute respiratory failure with hypoxemia ([facility] 3/11/2022 Yes        Assessment:    Condition: In stable condition. Improving. (Sepsis ? Cause aspiration pneumonia  Was on zosyn and vanc , now on  levaquin ,   Acute respiratory failure secondary to above on o2 ,   Metabolic encephalopathy in immuno compromised host , on acyclovir herpes simplex ,   Left femoral and popliteal DVT on eliquis . S/p IVC filter placement   Acute on chronic renal failure , start iv fluids and monitor closely ,   hyperlpid , continue lipitor   Bipolar disorder on pristiq  And clozaril  Chronic renal  failure s/p renal transplant 2015 . On cellcept and cyclosporine   Hypothyroidism on levothyroxine . ).        Electronically signed by Raheel Jama MD on 3/11/22 at 8:21 PM EST

## 2022-03-13 ENCOUNTER — APPOINTMENT (OUTPATIENT)
Dept: GENERAL RADIOLOGY | Age: 67
DRG: 177 | End: 2022-03-13
Payer: MEDICARE

## 2022-03-13 LAB
ALBUMIN SERPL-MCNC: 1.9 G/DL (ref 3.5–5.2)
ALP BLD-CCNC: 65 U/L (ref 35–104)
ALT SERPL-CCNC: 11 U/L (ref 0–32)
ANION GAP SERPL CALCULATED.3IONS-SCNC: 12 MMOL/L (ref 7–16)
ANION GAP SERPL CALCULATED.3IONS-SCNC: 12 MMOL/L (ref 7–16)
AST SERPL-CCNC: 13 U/L (ref 0–31)
B.E.: -7.2 MMOL/L (ref -3–3)
BASOPHILS ABSOLUTE: 0.05 E9/L (ref 0–0.2)
BASOPHILS RELATIVE PERCENT: 1 % (ref 0–2)
BILIRUB SERPL-MCNC: 0.3 MG/DL (ref 0–1.2)
BUN BLDV-MCNC: 43 MG/DL (ref 6–23)
BUN BLDV-MCNC: 44 MG/DL (ref 6–23)
CALCIUM SERPL-MCNC: 8.7 MG/DL (ref 8.6–10.2)
CALCIUM SERPL-MCNC: 9 MG/DL (ref 8.6–10.2)
CHLORIDE BLD-SCNC: 107 MMOL/L (ref 98–107)
CHLORIDE BLD-SCNC: 110 MMOL/L (ref 98–107)
CO2: 21 MMOL/L (ref 22–29)
CO2: 21 MMOL/L (ref 22–29)
COHB: 0.3 % (ref 0–1.5)
CREAT SERPL-MCNC: 2.6 MG/DL (ref 0.5–1)
CREAT SERPL-MCNC: 2.6 MG/DL (ref 0.5–1)
CRITICAL: ABNORMAL
DATE ANALYZED: ABNORMAL
DATE OF COLLECTION: ABNORMAL
EOSINOPHILS ABSOLUTE: 0.16 E9/L (ref 0.05–0.5)
EOSINOPHILS RELATIVE PERCENT: 3 % (ref 0–6)
GFR AFRICAN AMERICAN: 22
GFR AFRICAN AMERICAN: 22
GFR NON-AFRICAN AMERICAN: 18 ML/MIN/1.73
GFR NON-AFRICAN AMERICAN: 18 ML/MIN/1.73
GLUCOSE BLD-MCNC: 110 MG/DL (ref 74–99)
GLUCOSE BLD-MCNC: 91 MG/DL (ref 74–99)
HCO3: 19.4 MMOL/L (ref 22–26)
HCT VFR BLD CALC: 26.9 % (ref 34–48)
HEMOGLOBIN: 7.9 G/DL (ref 11.5–15.5)
HERPES TYPE 1/2 IGM COMBINED: 0.87 IV
HERPES TYPE I/II IGG COMBINED: >22.4 IV
HHB: 4.1 % (ref 0–5)
HSV 1 GLYCOPROTEIN G AB IGG: 29.5 IV
HSV 2 GLYCOPROTEIN G AB IGG: 9.8 IV
LAB: ABNORMAL
LACTIC ACID: 0.4 MMOL/L (ref 0.5–2.2)
LYMPHOCYTES ABSOLUTE: 0.48 E9/L (ref 1.5–4)
LYMPHOCYTES RELATIVE PERCENT: 9 % (ref 20–42)
Lab: ABNORMAL
MCH RBC QN AUTO: 31.9 PG (ref 26–35)
MCHC RBC AUTO-ENTMCNC: 29.4 % (ref 32–34.5)
MCV RBC AUTO: 108.5 FL (ref 80–99.9)
METAMYELOCYTES RELATIVE PERCENT: 1 % (ref 0–1)
METHB: 0.5 % (ref 0–1.5)
MODE: ABNORMAL
MONOCYTES ABSOLUTE: 0.26 E9/L (ref 0.1–0.95)
MONOCYTES RELATIVE PERCENT: 5 % (ref 2–12)
MYELOCYTE PERCENT: 1 % (ref 0–0)
NEUTROPHILS ABSOLUTE: 4.35 E9/L (ref 1.8–7.3)
NEUTROPHILS RELATIVE PERCENT: 80 % (ref 43–80)
O2 CONTENT: 11.9 ML/DL
O2 SATURATION: 95.9 % (ref 92–98.5)
O2HB: 95.1 % (ref 94–97)
OPERATOR ID: ABNORMAL
PATIENT TEMP: 37 C
PCO2: 44 MMHG (ref 35–45)
PDW BLD-RTO: 15.5 FL (ref 11.5–15)
PH BLOOD GAS: 7.26 (ref 7.35–7.45)
PHOSPHORUS: 4.6 MG/DL (ref 2.5–4.5)
PLATELET # BLD: 154 E9/L (ref 130–450)
PMV BLD AUTO: 13.4 FL (ref 7–12)
PO2: 87.2 MMHG (ref 75–100)
POLYCHROMASIA: ABNORMAL
POTASSIUM SERPL-SCNC: 3.4 MMOL/L (ref 3.5–5)
POTASSIUM SERPL-SCNC: 3.4 MMOL/L (ref 3.5–5)
RBC # BLD: 2.48 E12/L (ref 3.5–5.5)
SODIUM BLD-SCNC: 140 MMOL/L (ref 132–146)
SODIUM BLD-SCNC: 143 MMOL/L (ref 132–146)
SOURCE, BLOOD GAS: ABNORMAL
THB: 8.8 G/DL (ref 11.5–16.5)
TIME ANALYZED: 919
TOTAL PROTEIN: 4.1 G/DL (ref 6.4–8.3)
WBC # BLD: 5.3 E9/L (ref 4.5–11.5)

## 2022-03-13 PROCEDURE — 6360000002 HC RX W HCPCS: Performed by: INTERNAL MEDICINE

## 2022-03-13 PROCEDURE — 83605 ASSAY OF LACTIC ACID: CPT

## 2022-03-13 PROCEDURE — 36415 COLL VENOUS BLD VENIPUNCTURE: CPT

## 2022-03-13 PROCEDURE — 84100 ASSAY OF PHOSPHORUS: CPT

## 2022-03-13 PROCEDURE — 6370000000 HC RX 637 (ALT 250 FOR IP): Performed by: INTERNAL MEDICINE

## 2022-03-13 PROCEDURE — 2500000003 HC RX 250 WO HCPCS: Performed by: INTERNAL MEDICINE

## 2022-03-13 PROCEDURE — 2700000000 HC OXYGEN THERAPY PER DAY

## 2022-03-13 PROCEDURE — 6370000000 HC RX 637 (ALT 250 FOR IP): Performed by: SPECIALIST

## 2022-03-13 PROCEDURE — 2580000003 HC RX 258: Performed by: INTERNAL MEDICINE

## 2022-03-13 PROCEDURE — 36592 COLLECT BLOOD FROM PICC: CPT

## 2022-03-13 PROCEDURE — 80048 BASIC METABOLIC PNL TOTAL CA: CPT

## 2022-03-13 PROCEDURE — 74018 RADEX ABDOMEN 1 VIEW: CPT

## 2022-03-13 PROCEDURE — 2580000003 HC RX 258: Performed by: SPECIALIST

## 2022-03-13 PROCEDURE — 80053 COMPREHEN METABOLIC PANEL: CPT

## 2022-03-13 PROCEDURE — 85025 COMPLETE CBC W/AUTO DIFF WBC: CPT

## 2022-03-13 PROCEDURE — 82805 BLOOD GASES W/O2 SATURATION: CPT

## 2022-03-13 PROCEDURE — 6360000002 HC RX W HCPCS: Performed by: SPECIALIST

## 2022-03-13 PROCEDURE — 80158 DRUG ASSAY CYCLOSPORINE: CPT

## 2022-03-13 PROCEDURE — 2500000003 HC RX 250 WO HCPCS: Performed by: SPECIALIST

## 2022-03-13 PROCEDURE — 1200000000 HC SEMI PRIVATE

## 2022-03-13 RX ORDER — MIDODRINE HYDROCHLORIDE 5 MG/1
5 TABLET ORAL 2 TIMES DAILY PRN
Status: DISCONTINUED | OUTPATIENT
Start: 2022-03-13 | End: 2022-03-23 | Stop reason: HOSPADM

## 2022-03-13 RX ORDER — DEXTROSE, SODIUM CHLORIDE, AND POTASSIUM CHLORIDE 5; .9; .15 G/100ML; G/100ML; G/100ML
INJECTION INTRAVENOUS CONTINUOUS
Status: DISCONTINUED | OUTPATIENT
Start: 2022-03-13 | End: 2022-03-15

## 2022-03-13 RX ADMIN — POTASSIUM CHLORIDE, DEXTROSE MONOHYDRATE AND SODIUM CHLORIDE: 150; 5; 900 INJECTION, SOLUTION INTRAVENOUS at 17:06

## 2022-03-13 RX ADMIN — FIDAXOMICIN 200 MG: 200 TABLET, FILM COATED ORAL at 22:37

## 2022-03-13 RX ADMIN — SODIUM BICARBONATE: 84 INJECTION, SOLUTION INTRAVENOUS at 11:08

## 2022-03-13 RX ADMIN — HEPARIN 100 UNITS: 100 SYRINGE at 10:16

## 2022-03-13 RX ADMIN — SODIUM CHLORIDE: 9 INJECTION, SOLUTION INTRAVENOUS at 03:50

## 2022-03-13 RX ADMIN — APIXABAN 5 MG: 5 TABLET, FILM COATED ORAL at 22:38

## 2022-03-13 RX ADMIN — ATORVASTATIN CALCIUM 10 MG: 10 TABLET, FILM COATED ORAL at 22:37

## 2022-03-13 RX ADMIN — CLOZAPINE 50 MG: 25 TABLET ORAL at 22:36

## 2022-03-13 RX ADMIN — ACYCLOVIR SODIUM 350 MG: 50 INJECTION, SOLUTION INTRAVENOUS at 18:40

## 2022-03-13 RX ADMIN — METRONIDAZOLE 500 MG: 500 INJECTION, SOLUTION INTRAVENOUS at 23:05

## 2022-03-13 RX ADMIN — Medication 10 ML: at 22:40

## 2022-03-13 RX ADMIN — CYCLOSPORINE 100 MG: 25 CAPSULE, GELATIN COATED ORAL at 22:37

## 2022-03-13 RX ADMIN — MYCOPHENOLATE MOFETIL 500 MG: 250 CAPSULE ORAL at 22:38

## 2022-03-13 RX ADMIN — ACYCLOVIR SODIUM 350 MG: 50 INJECTION, SOLUTION INTRAVENOUS at 09:38

## 2022-03-13 RX ADMIN — Medication 10 ML: at 10:16

## 2022-03-13 RX ADMIN — LEVOFLOXACIN 750 MG: 5 INJECTION, SOLUTION INTRAVENOUS at 17:05

## 2022-03-13 RX ADMIN — ACYCLOVIR SODIUM 350 MG: 50 INJECTION, SOLUTION INTRAVENOUS at 03:51

## 2022-03-13 RX ADMIN — HEPARIN 100 UNITS: 100 SYRINGE at 22:39

## 2022-03-13 RX ADMIN — PANTOPRAZOLE SODIUM 40 MG: 40 TABLET, DELAYED RELEASE ORAL at 06:31

## 2022-03-13 RX ADMIN — METRONIDAZOLE 500 MG: 500 INJECTION, SOLUTION INTRAVENOUS at 12:40

## 2022-03-13 RX ADMIN — LEVOTHYROXINE SODIUM 50 MCG: 0.05 TABLET ORAL at 06:31

## 2022-03-13 ASSESSMENT — PAIN SCALES - GENERAL: PAINLEVEL_OUTOF10: 0

## 2022-03-13 NOTE — PROGRESS NOTES
[facility] MD Sabrina Reynolds MD    [facility] PATIENT’S [facility] MD Daina Vang MD  Progress note  Patient's Name: Shashi Ny  3:02 PM  3/13/2022    Sub :   3/13 : seen in her room  Weak decondioned lying flat in bed , poor po intake , clinically euvolemic     History of Present Ilness:      Patient has h/o preserved left ventricular systolic function (CLEMENCIA  5/54/1203), stress test with no significant reversibility 6/16/2015, paroxysmal atrial fibrillation (on Eliquis), chronic left bundle branch block, HTN, HLD,  s/p kidney transplant,CKD, H/o DVT, s/p IVC on AC now, PVD, s/p thyroidectomy for cancer, gout, obesity     She was recently treated in hospitalfrom 2/25/2020 to 3/7/2022 because of acute hypoxic respiratory failure likely due to aspiration pneumonia with positive blood culture and acute metabolic encephalopathy     Patient was brought from [facility] and rehab facility to ED of [facility] on 3/9/22 due to new mental status changes. Nephrology asked to see for CECILY /CKD   Pt has hx of cadaveric kidney transplant 7 years back   Her baseline cr has been 1.2-1.6 with a lot of fluctuation in contest of dehydration . Pt seen in her room , she is very weak and deconditoned to my eyes she has lost a lot of weight .    She has a hassan in place   She has poor po intake   She is euvolemic to mildly hypovolemic       Past Medical History:   Diagnosis Date    Abnormal EKG     left bundle branch block    Acute gout involving toe of right foot 09/03/2020    Acute on chronic combined systolic (congestive) and diastolic (congestive) heart failure (HCC)     Cancer (HCC)     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    Cerebrovascular disease     Clotting disorder ([address] 1985    thrombophlebitis after c section delivery    Depression     15 years followed by dr Viral [facility]ling [facility] patient [facility])     History of blood transfusion     Hyperlipidemia     Hypertension     Hypothyroidism     Leg swelling 2020    Lymphedema of both lower extremities 2020    Peripheral vascular disease ([address]     Renal failure 2012    renal transplant    Thrombophlebitis     after c section delivery    Thyroid disease        Past Surgical History:   Procedure Laterality Date     SECTION  1985    one normal delivery    COLECTOMY N/A 1/15/2022    DIAGNOSTIC  LAPAROSCOPY LYSIS OF ADHESIONS performed by Marichuy Ndiaye MD at [address] [facility] [address] CATH LAB PROCEDURE      normal coronaries and 1996 normal coronaries    DIALYSIS FISTULA CREATION  march and 2012    phase one and two patient will start dialysis when needed   [address]    transfemoral venous bypass grafts    IR IVC FILTER PLACEMENT W IMAGING  2022    IR IVC FILTER PLACEMENT W IMAGING 2022 [facility] SPECIAL PROCEDURES    KIDNEY TRANSPLANT  2016    KIDNEY TRANSPLANT  2015    KIDNEY TRANSPLANT  2015    PARATHYROIDECTOMY  2006    left parathyroid  implant and parathyroidectomy    TRANSESOPHAGEAL ECHOCARDIOGRAM N/A 2022    TRANSESOPHAGEAL ECHOCARDIOGRAM WITH BUBBLE STUDY performed by Macho Parish MD at [facility] ENDOSCOPY       Family History   Problem Relation Age of Onset    Diabetes Mother     Diabetes Father     Dementia Father         reports that she quit smoking about 6 years ago. She has a 20.00 pack-year smoking history. She has never used smokeless tobacco. She reports that she does not drink alcohol and does not use drugs.     Allergies:  Macrodantin [nitrofurantoin macrocrystal] and Sulfa antibiotics    Current Medications:    metronidazole (FLAGYL) 500 mg in NaCl 100 mL IVPB premix, Q8H  dextrose 5 % and 0.9 % NaCl with KCl 20 mEq infusion, Continuous  lidocaine 1 % injection 5 mL, Once  sodium chloride flush 0.9 % injection 5-40 mL, 2 times per day  sodium chloride flush 0.9 % injection 5-40 mL, PRN  0.9 % sodium chloride infusion, PRN  heparin flush 100 UNIT/ML injection 100 Units, 2 times per day  heparin flush 100 UNIT/ML injection 100 Units, PRN  [Held by provider] metoprolol tartrate (LOPRESSOR) tablet 12.5 mg, BID  Fidaxomicin (DIFICID) tablet 200 mg, BID  levoFLOXacin (LEVAQUIN) 750 MG/150ML infusion 750 mg, Q48H  acyclovir (ZOVIRAX) 350 mg in dextrose 5 % 50 mL IVPB, Q8H  apixaban (ELIQUIS) tablet 5 mg, BID  desvenlafaxine succinate (PRISTIQ) extended release tablet 50 mg, Daily  ondansetron (ZOFRAN-ODT) disintegrating tablet 4 mg, Q8H PRN  atorvastatin (LIPITOR) tablet 10 mg, Nightly  cloZAPine (CLOZARIL) tablet 50 mg, Nightly  [Held by provider] lisinopril (PRINIVIL;ZESTRIL) tablet 5 mg, Daily  levothyroxine (SYNTHROID) tablet 50 mcg, Daily  pantoprazole (PROTONIX) tablet 40 mg, QAM AC  docusate sodium (COLACE) capsule 100 mg, BID  cycloSPORINE (SANDIMMUNE) capsule 100 mg, BID  mycophenolate (CELLCEPT) capsule 500 mg, BID  predniSONE (DELTASONE) tablet 5 mg, Daily  allopurinol (ZYLOPRIM) tablet 173 mg, Daily  folic acid (FOLVITE) tablet 1 mg, Daily        Review of Systems:   Constitutional: weakness   Eyes: no eye pain , no itching , no drainage  Ears, nose, mouth, throat, and face: no ear ,nose pain , hearing is ok ,no nasal drainage   Respiratory: no sob ,no cough ,no wheezing . Cardiovascular: no chest pain , no palpitation ,no sob . Gastrointestinal: no nausea, vomiting , constipation , no abdominal pain . Genitourinary:no urinary retention , no burning , dysuria . No polyuria   Hematologic/lymphatic: no bleeding , no cougulation issues . Musculoskeletal:no joint pain , no swelling . Neurological: no headaches ,no weakness , no numbness . Endocrine: no thirst , no weight issues .      Physical exam:   Vital signs BP (!) 97/52   Pulse 80   Temp 97.4 °F (36.3 °C) (Axillary)   Resp 16   Ht 5' 2\" (1.575 m)   Wt 220 lb (99.8 kg)   SpO2 98%   BMI 40.24 kg/m²   Gen : NAD , appropriate for stated age .   Head : at , nc   Neck : supple , no thyromegaly noted . Eyes : EOMI , PERRLA   CV : RRR , No M/R/G . Lungs: CTAB , no wheezing , good flow heard b/l   Abd : soft , NT , BS + , No Organomegaly appreciated . Skin : soft, dry . Neuro : CN  II-XII grossly intact , no focal neurologic deficit . Psych : cooperative .      Data:   Labs:  CBC with Differential:    Lab Results   Component Value Date    WBC 5.3 03/13/2022    RBC 2.48 03/13/2022    HGB 7.9 03/13/2022    HCT 26.9 03/13/2022     03/13/2022    .5 03/13/2022    MCH 31.9 03/13/2022    MCHC 29.4 03/13/2022    RDW 15.5 03/13/2022    NRBC 1.0 01/13/2022    METASPCT 1.0 03/13/2022    LYMPHOPCT 9.0 03/13/2022    MONOPCT 5.0 03/13/2022    MYELOPCT 1.0 03/13/2022    BASOPCT 1.0 03/13/2022    MONOSABS 0.26 03/13/2022    LYMPHSABS 0.48 03/13/2022    EOSABS 0.16 03/13/2022    BASOSABS 0.05 03/13/2022     CMP:    Lab Results   Component Value Date     03/13/2022    K 3.4 03/13/2022    K 4.7 03/09/2022     03/13/2022    CO2 21 03/13/2022    BUN 44 03/13/2022    CREATININE 2.6 03/13/2022    GFRAA 22 03/13/2022    LABGLOM 18 03/13/2022    GLUCOSE 91 03/13/2022    PROT 4.1 03/13/2022    LABALBU 1.9 03/13/2022    CALCIUM 8.7 03/13/2022    BILITOT 0.3 03/13/2022    ALKPHOS 65 03/13/2022    AST 13 03/13/2022    ALT 11 03/13/2022     Ionized Calcium:  No results found for: IONCA  Magnesium:    Lab Results   Component Value Date    MG 1.9 03/01/2022     Phosphorus:    Lab Results   Component Value Date    PHOS 4.6 03/13/2022     U/A:    Lab Results   Component Value Date    COLORU Yellow 03/09/2022    PHUR 5.0 03/09/2022    WBCUA 1-3 03/09/2022    RBCUA NONE 03/09/2022    RBCUA 10-20 03/12/2014    YEAST Present 04/09/2021    BACTERIA MODERATE 03/09/2022    CLARITYU Clear 03/09/2022    SPECGRAV 1.025 03/09/2022    LEUKOCYTESUR Negative 03/09/2022    UROBILINOGEN 0.2 03/09/2022    BILIRUBINUR Negative 03/09/2022    BLOODU Negative 03/09/2022 GLUCOSEU Negative 03/09/2022     Microalbumen/Creatinine ratio:  No components found for: RUCREAT  Iron Saturation:  No components found for: PERCENTFE  TIBC:    Lab Results   Component Value Date    TIBC 135 01/19/2022     FERRITIN:    Lab Results   Component Value Date    FERRITIN 4,062 01/23/2022        Imaging:  US RETROPERITONEAL COMPLETE   Final Result   No hydronephrosis of the transplant kidney. The bladder is decompressed by a Chin catheter. IR FLUORO GUIDED CVA DEVICE PLMT/REPLACE/REMOVAL   Final Result   Successful placement of a dual lumen power PICC line. The tip of the PICC   line catheter was placed within the distal right subclavian vein. [address]ilt IMPRESSION:   Successful ultrasound and fluoroscopy guided PICC placement         IR ULTRASOUND GUIDANCE VASCULAR ACCESS   Final Result   Successful placement of a dual lumen power PICC line. The tip of the PICC   line catheter was placed within the distal right subclavian vein. [address] [place] IMPRESSION:   Successful ultrasound and fluoroscopy guided PICC placement         CT HEAD WO CONTRAST   Final Result   No acute intracranial abnormality or hemorrhage. Bilateral mastoiditis and right sphenoid sinusitis. XR SHOULDER LEFT (MIN 2 VIEWS)   Final Result   No fracture. Increased separation of the humeral head from the osseous   glenoid and AC joint, which could represent subluxation. Otherwise, a large   synovial effusion, hematoma, or mass cannot be excluded. Clinical   correlation is recommended. CT scan of the left shoulder can be performed   for further evaluation. Cardiomegaly. XR HUMERUS LEFT (MIN 2 VIEWS)   Final Result   No fracture or dislocation. Increased separation of the humeral head from   the osseous glenoid and AC joint, which could represent subluxation. A large   synovial effusion, hematoma, or mass cannot be excluded. CT scan of the left   shoulder can be considered for further evaluation. XR CHEST PORTABLE   Final Result   1. Right perihilar discoid atelectasis. 2. There are no findings of failure or pneumonia. Assessment    -Acute kidney injury   -Hx of  donor kidney transplant :  -Immunosuppression host   -Anaemia of chronic disease   -severe deconditioning     Plan:    Cr appear to have plateued   Urine lytes support volume depletion .    Urine eosninophills negative   US transplant kidney no reported issues   Cyclosporine trough pending       Continue iv fluids in form of d5w-NS at 100 cc/hr   F/u serial bmp ,UO        Electronically signed by Lesly Antonio MD on 3/13/2022 at 3:02 PM

## 2022-03-13 NOTE — PROGRESS NOTES
Progress Note  Date:3/13/2022       Room:0415/0415-01  Patient Rachel Posada     YOB: 1955     Age:66 y.o. Subjective    Subjective:  Symptoms:  Stable. (Lethargic , sleeping , answers ffaintly on calling  her . ). Diet:  Poor intake. Activity level: Impaired due to weakness. Review of Systems  Objective         Vitals Last 24 Hours:  TEMPERATURE:  Temp  Av.9 °F (36.1 °C)  Min: 96 °F (35.6 °C)  Max: 97.4 °F (36.3 °C)  RESPIRATIONS RANGE: Resp  Av  Min: 16  Max: 16  PULSE OXIMETRY RANGE: SpO2  Av %  Min: 98 %  Max: 98 %  PULSE RANGE: Pulse  Av.8  Min: 80  Max: 88  BLOOD PRESSURE RANGE: Systolic (76PSR), POR:93 , Min:65 , SAC:715   ; Diastolic (46JRD), WVY:90, Min:39, Max:57    I/O (24Hr): Intake/Output Summary (Last 24 hours) at 3/13/2022 1550  Last data filed at 3/13/2022 1108  Gross per 24 hour   Intake 3754.68 ml   Output 350 ml   Net 3404.68 ml     Objective:  General Appearance: In no acute distress (lethargic , arousable ). Vital signs: (most recent): Blood pressure (!) 97/52, pulse 80, temperature 97.4 °F (36.3 °C), temperature source Axillary, resp. rate 16, height 5' 2\" (1.575 m), weight 220 lb (99.8 kg), SpO2 98 %. (Slight Hypotension). Output: Producing urine. HEENT: Normal HEENT exam.    Lungs:  Normal effort and normal respiratory rate. There are decreased breath sounds. Heart: Normal rate. Regular rhythm. Positive for murmur. Abdomen: Abdomen is soft and distended. Bowel sounds are normal.   There is no abdominal tenderness. Extremities: (Edema +1  Both LL )  Pulses: Distal pulses are intact. Neurological: (Lethargic and sleepy ). Pupils:  Pupils are equal, round, and reactive to light.       Labs/Imaging/Diagnostics    Labs:  CBC:  Recent Labs     22  0827 22  0400 22  0540   WBC 13.0* 8.0 5.3   RBC 2.74* 2.35* 2.48*   HGB 8.5* 7.4* 7.9*   HCT 30.5* 26.0* 26.9*   .3* 110.6* 108.5*   RDW 15.9* 15.9* 15.5*    140 154     CHEMISTRIES:  Recent Labs     03/11/22 0827 03/12/22  0400 03/13/22  0540    143 143   K 4.2 3.7 3.4*   * 108* 110*   CO2 23 22 21*   BUN 43* 47* 44*   CREATININE 2.2* 2.5* 2.6*   GLUCOSE 75 89 91   PHOS  --   --  4.6*     PT/INR:No results for input(s): PROTIME, INR in the last 72 hours. APTT:No results for input(s): APTT in the last 72 hours. LIVER PROFILE:  Recent Labs     03/11/22 0827 03/12/22 0400 03/13/22  0540   AST 15 12 13   ALT 6 9 11   BILITOT 0.4 0.4 0.3   ALKPHOS 95 59 65       Imaging Last 24 Hours:  XR HUMERUS LEFT (MIN 2 VIEWS)    Result Date: 3/10/2022  EXAMINATION: TWO XRAY VIEWS OF THE LEFT HUMERUS 3/10/2022 9:31 am COMPARISON: None. HISTORY: ORDERING SYSTEM PROVIDED HISTORY: limited mobility TECHNOLOGIST PROVIDED HISTORY: Reason for exam:->limited mobility FINDINGS: There is no evidence of fracture or dislocation. No significant osteo-degenerative changes or other osseous abnormalities are identified. There is increased separation of the humeral head from the osseous glenoid and AC joint. This could represent subluxation. The presence of a large synovial effusion, hematoma, or mass cannot be excluded. CT scan of the left shoulder can be considered for further evaluation. Surgical clips are identified in the soft tissues about the mid to distal humerus. No fracture or dislocation. Increased separation of the humeral head from the osseous glenoid and AC joint, which could represent subluxation. A large synovial effusion, hematoma, or mass cannot be excluded. CT scan of the left shoulder can be considered for further evaluation.      CT HEAD WO CONTRAST    Result Date: 3/10/2022  EXAMINATION: CT OF THE HEAD WITHOUT CONTRAST  3/10/2022 3:29 pm TECHNIQUE: CT of the head was performed without the administration of intravenous contrast. Dose modulation, iterative reconstruction, and/or weight based adjustment of the mA/kV was utilized to reduce the radiation dose to as low as reasonably achievable. COMPARISON: February 25, 2022 HISTORY: ORDERING SYSTEM PROVIDED HISTORY: AMS TECHNOLOGIST PROVIDED HISTORY: Reason for exam:->AMS Has a \"code stroke\" or \"stroke alert\" been called? ->No FINDINGS: BRAIN/VENTRICLES: There are age related cortical atrophy and periventricular white matter ischemic changes. There is no acute intracranial hemorrhage, mass effect or midline shift. No abnormal extra-axial fluid collection. The gray-white differentiation is maintained without evidence of an acute infarct. There is no evidence of hydrocephalus. ORBITS: The visualized portion of the orbits demonstrate no acute abnormality. SINUSES: There is partial opacification of bilateral mastoid air cells. Mucosal thickening in the right sphenoid sinus. SOFT TISSUES/SKULL:  No acute abnormality of the visualized skull or soft tissues. No acute intracranial abnormality or hemorrhage. Bilateral mastoiditis and right sphenoid sinusitis. IR FLUORO GUIDED CVA DEVICE PLMT/REPLACE/REMOVAL    Result Date: 3/11/2022  PROCEDURE: ULTRASOUND GUIDED VASCULAR ACCESS. FLUOROSCOPY GUIDED PICC PLACEMENT 3/11/2022. HISTORY: ORDERING SYSTEM PROVIDED HISTORY: picc line/ medline TECHNOLOGIST PROVIDED HISTORY: Reason for exam:->picc line/ medline SEDATION: None FLUOROSCOPY DOSE AND TYPE OR TIME AND EXPOSURES: Fluoroscopy time equals 8.2 minutes. Total dose equals 116 mGy TECHNIQUE: The procedure was performed under ultrasound and fluoroscopic guidance. And ultrasound image and fluoroscopic image was obtained for medical records. Heddy  FINDINGS: The patient's right arm was prepped and draped in a sterile fashion using a maximum sterile barrier technique. Following the uneventful administration of lidocaine I introduced a micro puncture needle into the right basilic vein using ultrasound guidance. Through the micropuncture needle a microwire was introduced into the vein.  Over the microwire a peel-away sheath was placed within the vein. A catheter measurement was obtained. The catheter was then trimmed to 25 cm and introduced over the wire into the right basilic vein. The catheter tip was placed within the right subclavian vein. .  The peel-away sheath was then removed and the PICC line was secured to the skin. A sterile dressing was applied. The patient tolerated the procedure well and no complications. A fluoroscopic image was obtained which confirms position of the PICC line catheter. Successful placement of a dual lumen power PICC line. The tip of the PICC line catheter was placed within the distal right subclavian vein. Jennifer Salas IMPRESSION: Successful ultrasound and fluoroscopy guided PICC placement     XR SHOULDER LEFT (MIN 2 VIEWS)    Result Date: 3/10/2022  EXAMINATION: THREE XRAY VIEWS OF THE LEFT SHOULDER 3/10/2022 9:31 am COMPARISON: None HISTORY: ORDERING SYSTEM PROVIDED HISTORY: limited mobility and bruising TECHNOLOGIST PROVIDED HISTORY: Reason for exam:->limited mobility and bruising FINDINGS: There is no evidence of fracture. There is increased separation of the humeral head from the osseous glenoid and the acromioclavicular joint, which could represent subluxation. Otherwise, the presence of a large synovial effusion, hematoma, or mass cannot be excluded. Clinical correlation is recommended. CT scan of the left shoulder can be performed for further evaluation. The heart is enlarged. Surgical clips are identified in the soft tissues surrounding the midportion of the humerus. No fracture. Increased separation of the humeral head from the osseous glenoid and AC joint, which could represent subluxation. Otherwise, a large synovial effusion, hematoma, or mass cannot be excluded. Clinical correlation is recommended. CT scan of the left shoulder can be performed for further evaluation. Cardiomegaly.      IR ULTRASOUND GUIDANCE VASCULAR ACCESS    Result Date: 3/11/2022  PROCEDURE: ULTRASOUND GUIDED VASCULAR ACCESS. FLUOROSCOPY GUIDED PICC PLACEMENT 3/11/2022. HISTORY: ORDERING SYSTEM PROVIDED HISTORY: picc line/ medline TECHNOLOGIST PROVIDED HISTORY: Reason for exam:->picc line/ medline SEDATION: None FLUOROSCOPY DOSE AND TYPE OR TIME AND EXPOSURES: Fluoroscopy time equals 8.2 minutes. Total dose equals 116 mGy TECHNIQUE: The procedure was performed under ultrasound and fluoroscopic guidance. And ultrasound image and fluoroscopic image was obtained for medical records. Tony Chu FINDINGS: The patient's right arm was prepped and draped in a sterile fashion using a maximum sterile barrier technique. Following the uneventful administration of lidocaine I introduced a micro puncture needle into the right basilic vein using ultrasound guidance. Through the micropuncture needle a microwire was introduced into the vein. Over the microwire a peel-away sheath was placed within the vein. A catheter measurement was obtained. The catheter was then trimmed to 25 cm and introduced over the wire into the right basilic vein. The catheter tip was placed within the right subclavian vein. .  The peel-away sheath was then removed and the PICC line was secured to the skin. A sterile dressing was applied. The patient tolerated the procedure well and no complications. A fluoroscopic image was obtained which confirms position of the PICC line catheter. Successful placement of a dual lumen power PICC line. The tip of the PICC line catheter was placed within the distal right subclavian vein. Tony Chu  IMPRESSION: Successful ultrasound and fluoroscopy guided PICC placement     Assessment//Plan           Hospital Problems           Last Modified POA    * (Principal) Acute respiratory failure with hypoxia ([address] 3/9/2022 Yes    Kidney transplanted 3/10/2022 Yes    Elevated troponin 3/10/2022 Yes    Paroxysmal atrial fibrillation ([address] 3/10/2022 Yes    Hypotension 3/10/2022 Yes    LBBB (left bundle branch block) 3/10/2022 Yes    History of DVT (deep vein thrombosis) 3/10/2022 Yes    Chronic anticoagulation 3/10/2022 Yes    Acute respiratory failure with hypoxemia ([address] 3/11/2022 Yes        Assessment:    Condition: In stable condition. (Sepsis ? Cause aspiration pneumonia  Was on zosyn and vanc , now on  levaquin ,   Acute respiratory failure secondary to above on o2 ,   Metabolic encephalopathy in immuno compromised host , on acyclovir herpes simplex ,   METABOIC ACIDOSIS , WILL PLACE HER ON HCO3  And await dr Shashi Serrano input IN [facility]   cdiff colitis on deficid  Left femoral and popliteal DVT on eliquis . S/p IVC filter placement   Acute on chronic renal failure , on  iv fluids and worsening ,   hyperlpid , continue lipitor   Bipolar disorder on pristiq  And clozaril  Chronic renal  failure s/p renal transplant 2015 . On cellcept and cyclosporine   Hypothyroidism on levothyroxine . Hypotension hold metorpolol and lisinopril   Calorie count        ).        Electronically signed by Steven Arias MD on 3/11/22 at 8:21 PM EST

## 2022-03-13 NOTE — PROGRESS NOTES
[facility]     [address] [place] [address]  [place], [address]  Phone [phone]   [phone]      Admit Date: 3/9/2022 10:02 AM  Pt Name: Serena Washington  MRN: [id number]  : 1955  Reason for Consult:    Chief Complaint   Patient presents with    Altered Mental Status     from NH     Requesting Physician:  Ema Hooper MD  PCP: Arjun Franks MD  History Obtained From:  patient, chart   ID consulted for Confusion [R41.0]  Acute respiratory failure with hypoxia ([address] [J96.01]  Acute respiratory failure with hypoxemia ([address] [J96.01]  on hospital day 2  CHIEF COMPLAINT       Chief Complaint   Patient presents with    Altered Mental Status     from NH     HISTORYOF PRESENT ILLNESS   Serena Washington is a 73 y.o. female who presents with   has a past medical history of Abnormal EKG, Acute gout involving toe of right foot, Acute on chronic combined systolic (congestive) and diastolic (congestive) heart failure ([address] Cancer ([address] Cerebrovascular disease, Clotting disorder ([address] Depression, Hemodialysis patient ([address] History of blood transfusion, Hyperlipidemia, Hypertension, Hypothyroidism, Leg swelling, Lymphedema of both lower extremities, Peripheral vascular disease ([address] Renal failure, Thrombophlebitis, and Thyroid disease. Altered Mental Status      Know to ID   · Last seen on 3/2/2022-d/c on Augmentin cover poss aspiration and colitis  Pt came in due to altered mental status. Wbc24.7 hgb10.8 sbs471 cr1.6 TMAX99.3  PT WAS SEEN AND EXAMINED WITH NURSES PRESENT  PT IS NOT AROUSABLE UNABLE TO OBTAIN HISTORY    PT HAS SOFT STOOLS SOME ODOR  SACRAL AREA VIEWED HAS SMALL DTI AREA  CXRY 1. Right perihilar discoid atelectasis. 2. There are no findings of failure or pneumonia.    LEFT SHOULDER No fracture.  Increased separation of the humeral head from the osseous   glenoid and AC joint, which could represent subluxation.  Otherwise, a large   synovial effusion, hematoma, or mass cannot be excluded.  Clinical   correlation is recommended.  CT scan of the left shoulder can be performed   for further evaluation.  Cardiomegaly. LEFT HUMERUS No fracture or dislocation.  Increased separation of the humeral head from   the osseous glenoid and AC joint, which could represent subluxation.  A large   synovial effusion, hematoma, or mass cannot be excluded.  CT scan of the left   shoulder can be considered for further evaluation. piperacillin-tazobactam (ZOSYN) 3,375 mg in dextrose 5 % 50 mL IVPB extended infusion (mini-bag), Q8H  vancomycin (VANCOCIN) oral solution 125 mg, 4 times per day  cycloSPORINE (SANDIMMUNE) capsule 100 mg, BID  mycophenolate (CELLCEPT) capsule 500 mg, BID  metoprolol succinate (TOPROL XL) extended release tablet 50 mg, Daily  predniSONE (DELTASONE) tablet 5 mg, Daily     LAST BLOOD CX  cons   CLEMENCIA  Summary   Technically difficult study. No definate evidence of vegetation consistent with endocarditis. Normal LV segmental wall motion. Ejection fraction is visually estimated at 60 to 65%. Normal right ventricular size and function.     DOS  22   In bed briefly arouses   Does not stay awake   Spoke with nurse unable to give meds    3/12/2022  briefly arouses has no abd pain    3/11/2022  In bed open eyes briefly still lethargic  cdiff +  REVIEW OF SYSTEMS     CONSTITUTIONAL:   AS IN HPI     Medications Prior to Admission: pantoprazole (PROTONIX) 40 MG tablet, Take 1 tablet by mouth every morning (before breakfast)  [] amoxicillin-clavulanate (AUGMENTIN) 875-125 MG per tablet, Take 1 tablet by mouth every 12 hours for 10 days  apixaban (ELIQUIS) 5 MG TABS tablet, Take 5 mg by mouth 2 times daily Take in the morning and at bedtime for 3 months (started 2022)  atorvastatin (LIPITOR) 10 MG tablet, Take 10 mg by mouth daily  lisinopril (PRINIVIL;ZESTRIL) 5 MG tablet, take 1 tablet by mouth once daily  pregabalin (LYRICA) 75 MG capsule,   nystatin (MYCOSTATIN) 172367 UNIT/GM powder, Apply 3 times daily. ipratropium-albuterol (DUONEB) 0.5-2.5 (3) MG/3ML SOLN nebulizer solution, Inhale 3 mLs into the lungs every 6 hours as needed for Shortness of Breath  ondansetron (ZOFRAN-ODT) 4 MG disintegrating tablet, Take 1 tablet by mouth every 8 hours as needed for Nausea or Vomiting  allopurinol (ZYLOPRIM) 100 MG tablet, Take 100 mg by mouth daily  Cyanocobalamin (B-12 COMPLIANCE INJECTION) 1000 MCG/ML KIT, Inject as directed monthly  metoprolol succinate (TOPROL XL) 50 MG extended release tablet, take 1 tablet by mouth once daily  desvenlafaxine succinate (PRISTIQ) 50 MG TB24 extended release tablet, Take 1 tablet by mouth every evening  cloZAPine (CLOZARIL) 50 MG tablet, Take 50 mg by mouth nightly  cycloSPORINE (SANDIMMUNE) 100 MG capsule, Take 100 mg by mouth 2 times daily  predniSONE (DELTASONE) 5 MG tablet, Take 5 mg by mouth daily  folic acid (FOLVITE) 1 MG tablet, Take 1 mg by mouth daily  docusate sodium (COLACE) 100 MG capsule, Take 100 mg by mouth 2 times daily  mycophenolate (CELLCEPT) 500 MG tablet, Take 1,000 mg by mouth 2 times daily  levothyroxine (SYNTHROID) 50 MCG tablet, Take 50 mcg by mouth daily.     budesonide (PULMICORT) 0.5 MG/2ML nebulizer suspension, Take 2 mLs by nebulization 2 times daily for 10 days  CURRENT MEDICATIONS     Current Facility-Administered Medications:     sodium bicarbonate 50 mEq in dextrose 5 % 1,000 mL infusion, , IntraVENous, Continuous, Ema Hooper MD, Last Rate: 75 mL/hr at 03/13/22 1108, New Bag at 03/13/22 1108    metronidazole (FLAGYL) 500 mg in NaCl 100 mL IVPB premix, 500 mg, IntraVENous, Q8H, Katie Marshall MD, Last Rate: 100 mL/hr at 03/13/22 1240, 500 mg at 03/13/22 1240    lidocaine 1 % injection 5 mL, 5 mL, IntraDERmal, Once, Ema Hooper MD    sodium chloride flush 0.9 % injection 5-40 mL, 5-40 mL, IntraVENous, 2 times per day, Susannah Ramirez MD, 10 mL at 03/13/22 1016    sodium chloride flush 0.9 % injection 5-40 mL, 5-40 mL, IntraVENous, PRN, Jett Ramirez MD    0.9 % sodium chloride infusion, 25 mL, IntraVENous, PRN, Jett Ramirez MD    heparin flush 100 UNIT/ML injection 100 Units, 1 mL, IntraVENous, 2 times per day, Nile Lara MD, 100 Units at 03/13/22 1016    heparin flush 100 UNIT/ML injection 100 Units, 1 mL, IntraCATHeter, PRN, Nile Lara MD    [Held by provider] metoprolol tartrate (LOPRESSOR) tablet 12.5 mg, 12.5 mg, Oral, BID, Helio Garibay MD    Fidaxomicin (DIFICID) tablet 200 mg, 200 mg, Oral, BID, Brian Cruz MD, 200 mg at 03/12/22 2136    levoFLOXacin (LEVAQUIN) 750 MG/150ML infusion 750 mg, 750 mg, IntraVENous, Q48H, Brian Cruz MD    acyclovir (ZOVIRAX) 350 mg in dextrose 5 % 50 mL IVPB, 5 mg/kg (Adjusted), IntraVENous, Q8H, Brian Cruz MD, Stopped at 03/13/22 1040    apixaban (ELIQUIS) tablet 5 mg, 5 mg, Oral, BID, Susannah Ramirez MD, 5 mg at 03/12/22 2130    desvenlafaxine succinate (PRISTIQ) extended release tablet 50 mg, 50 mg, Oral, Daily, Susannah Ramirez MD, 50 mg at 03/12/22 1222    ondansetron (ZOFRAN-ODT) disintegrating tablet 4 mg, 4 mg, Oral, Q8H PRN, Jett Ramirez MD    atorvastatin (LIPITOR) tablet 10 mg, 10 mg, Oral, Nightly, Susannah Ramirez MD, 10 mg at 03/12/22 2130    cloZAPine (CLOZARIL) tablet 50 mg, 50 mg, Oral, Nightly, Susannah Ramirez MD, 50 mg at 03/09/22 2232    [Held by provider] lisinopril (PRINIVIL;ZESTRIL) tablet 5 mg, 5 mg, Oral, Daily, Nile Lara MD, 5 mg at 03/10/22 8456    levothyroxine (SYNTHROID) tablet 50 mcg, 50 mcg, Oral, Daily, Nile Lara MD, 50 mcg at 03/13/22 0631    pantoprazole (PROTONIX) tablet 40 mg, 40 mg, Oral, QAM AC, Susannah Ramirez MD, 40 mg at 03/13/22 0631    docusate sodium (COLACE) capsule 100 mg, 100 mg, Oral, BID, Susannah Ramirez MD, 100 mg at 03/10/22 0831    cycloSPORINE (SANDIMMUNE) capsule 100 mg, 100 mg, Oral, BID, Heba L MD James, 100 mg at 03/12/22 2131    mycophenolate (CELLCEPT) capsule 500 mg, 500 mg, Oral, BID, Pankaj Giraldo MD, 500 mg at 03/12/22 2132    predniSONE (DELTASONE) tablet 5 mg, 5 mg, Oral, Daily, Pankaj Giraldo MD, 5 mg at 03/12/22 2813    allopurinol (ZYLOPRIM) tablet 100 mg, 100 mg, Oral, Daily, Pankaj Giraldo MD, 100 mg at 87/52/31 0508    folic acid (FOLVITE) tablet 1 mg, 1 mg, Oral, Daily, Pankaj Giraldo MD, 1 mg at 03/12/22 1503  ALLERGIES     Macrodantin [nitrofurantoin macrocrystal] and Sulfa antibiotics  Immunization History   Administered Date(s) Administered    COVID-19, Pfizer Purple top, DILUTE for use, 12+ yrs, 30mcg/0.3mL dose 01/28/2021, 02/18/2021, 08/18/2021      Internal Administration   First Dose COVID-19, [employer] top, DILUTE for use, 12+ yrs, 30mcg/0.3mL dose  01/28/2021   Second Dose COVID-19, Pfizer Purple top, DILUTE for use, 12+ yrs, 30mcg/0.3mL dose   02/18/2021       Last COVID Lab SARS-CoV-2 (no units)   Date Value   01/20/2021 Not Detected     SARS-CoV-2 Antibody, Total (no units)   Date Value   01/13/2022 Non-Reactive     SARS-CoV-2, PCR (no units)   Date Value   02/25/2022 Not Detected     SARS-CoV-2, NAAT (no units)   Date Value   03/02/2022 Not Detected            PAST MEDICAL HISTORY     Past Medical History:   Diagnosis Date    Abnormal EKG     left bundle branch block    Acute gout involving toe of right foot 09/03/2020    Acute on chronic combined systolic (congestive) and diastolic (congestive) heart failure (HCC)     Cancer (HCC)     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    Cerebrovascular disease     Clotting disorder ([facility] 1985    thrombophlebitis after c section delivery    Depression     15 years followed by dr Angeles Hardy Hemodialysis patient [facility])     History of blood transfusion     Hyperlipidemia     Hypertension     Hypothyroidism     Leg swelling 09/03/2020    Lymphedema of both lower extremities 09/03/2020    Peripheral vascular disease [facility]     Renal failure 2012    renal transplant    Thrombophlebitis     after c section delivery    Thyroid disease      SURGICAL HISTORY       Past Surgical History:   Procedure Laterality Date     SECTION  1985    one normal delivery    COLECTOMY N/A 1/15/2022    DIAGNOSTIC  LAPAROSCOPY LYSIS OF ADHESIONS performed by Rashmi Bales MD at [address] [facility] [address] CATH LAB PROCEDURE      normal coronaries and  normal coronaries    DIALYSIS FISTULA CREATION  march and 2012    phase one and two patient will start dialysis when needed   [address]    transfemoral venous bypass grafts    IR IVC FILTER PLACEMENT W IMAGING  2022    IR IVC FILTER PLACEMENT W IMAGING 2022 [address] SPECIAL PROCEDURES    KIDNEY TRANSPLANT  2016    KIDNEY TRANSPLANT  2015    KIDNEY TRANSPLANT  2015    PARATHYROIDECTOMY  2006    left parathyroid  implant and parathyroidectomy    TRANSESOPHAGEAL ECHOCARDIOGRAM N/A 2022    TRANSESOPHAGEAL ECHOCARDIOGRAM WITH BUBBLE STUDY performed by Cristela Renteria MD at [facility]     ·  [address]:    22 0345 22 1007 22 1015 22 1241   BP: (!) 121/57 (!) 65/39 (!) 93/51 (!) 97/52   Pulse: 88 80     Resp: 16 16     Temp: 96 °F (35.6 °C) 97.4 °F (36.3 °C)     TempSrc: Axillary Axillary     SpO2:  98%     Weight:       Height:         Physical Exam   Constitutional/General:  NAD in bed   Head: NC/AT  Neck: Supple, full ROM,    Pulmonary: Lungs DEC  to auscultation bilaterally. ON O2   Cardiovascular:  Regular rate and rhythm  Abdomen: Soft, + BS.    distension. Nontender.     Extremities:  Warm and well perfused  Skin: Warm and dry without rash    Neurologic:     AROUSE briefly   MERCHANT   3/11 midline   piv     DIAGNOSTIC RESULTS   RADIOLOGY:   ECHO Transesophageal    Result Date: 3/1/2022  Transesophageal Echocardiography Report (CLEMENCIA)   Demographics Patient Name      Brigitte Murillo       Gender              Female                    [address] [facility] Record    [id number]           Room Number         2329  Number   Account #         [de-identified]          Procedure Date      02/28/2022   Corporate ID                         Ordering Physician  Gustavo Arzate MD   Accession Number  [id number]         Referring Physician   Date of Birth     1955         Sonographer         Jack Bhatti                                                           [facility]   Age               73 year(s)         Interpreting        Gustavo Arzate MD                                       Physician                                        Any Other  Procedure Type of Study   CLEMENCIA procedure:Transesophageal Echo (CLEMENCIA), Color Flow Velocity Mapping. Procedure Date Date: 02/28/2022 Start: 11:00 AM Study Location: Portable Technical Quality: Adequate visualization Indications:R/O Endocarditis. Patient Status: Routine Height: 62 inches Weight: 220 pounds BSA: 1.99 m^2 BMI: 40.24 kg/m^2 BP: 138/79 mmHg CLEMENICA Performed By: the attending and the sonographer  Type of Anesthesia: Moderate sedation   Findings   Left Ventricle  Normal LV segmental wall motion. Ejection fraction is visually estimated at 60 to 65%. Right Ventricle  Normal right ventricular size and function. Left Atrium  No evidence of thrombus within left atrium. Right Atrium  Normal right atrium size. Agitated saline injected for shunt evaluation shows no shunt. Mitral Valve  No vegetation. Mild mitral regurgitation is present. Tricuspid Valve  No vegetation. Aortic Valve  No vegetation. The aortic valve appears mildly sclerotic. Pulmonic Valve  No vegetation. Conclusions   Summary  Technically difficult study. No definate evidence of vegetation consistent with endocarditis. Normal LV segmental wall motion. Ejection fraction is visually estimated at 60 to 65%. Normal right ventricular size and function.    Signature ----------------------------------------------------------------  Electronically signed by Mitzi León MD(Interpreting  physician) on 03/01/2022 09:11 AM  ----------------------------------------------------------------  [email or web address] [id number]    CT HEAD WO CONTRAST    Result Date: 2/25/2022  EXAMINATION: CT OF THE HEAD WITHOUT CONTRAST  2/25/2022 3:08 am TECHNIQUE: CT of the head was performed without the administration of intravenous contrast. Dose modulation, iterative reconstruction, and/or weight based adjustment of the mA/kV was utilized to reduce the radiation dose to as low as reasonably achievable. COMPARISON: Correlation with MRI dated 01/16/2022 HISTORY: ORDERING SYSTEM PROVIDED HISTORY: mental status change TECHNOLOGIST PROVIDED HISTORY: Has a \"code stroke\" or \"stroke alert\" been called? ->No Reason for exam:->mental status change Decision Support Exception - unselect if not a suspected or confirmed emergency medical condition->Emergency Medical Condition (MA) FINDINGS: BRAIN/VENTRICLES: There is no acute intracranial hemorrhage, mass effect or midline shift. No abnormal extra-axial fluid collection. The gray-white differentiation is maintained without evidence of an acute infarct. There is no evidence of hydrocephalus. Minimal cortical volume loss. Scattered vascular calcifications. ORBITS: The visualized portion of the orbits demonstrate no acute abnormality. SINUSES: Significant opacification of the left greater than right mastoid air cells similar to previous. Mild mucosal thickening in the left sphenoid sinus. This was also present previously. SOFT TISSUES/SKULL:  No acute abnormality of the visualized skull or soft tissues. No acute intracranial abnormality.  MRI would be useful if symptoms persist. Inflammatory changes of left greater than right mastoid air cells and left sphenoid sinus similar to prior MRI. RECOMMENDATIONS: Unavailable     NM LUNG VENT/PERFUSION (VQ)    Result Date: 2/25/2022  EXAMINATION: NUCLEAR MEDICINE VENTILATION PERFUSION SCAN. 2/25/2022 TECHNIQUE: 3.5 millicuries aerosolized Tc99m DTPA was administered via mask prior to planar imaging of the lungs in multiple projections. Then, 4.2 millicuries of Tc 73E MAA was administered intravenously prior to planar imaging of the lungs in similar projections. COMPARISON: Chest CT February 25, 2022 . HISTORY: ORDERING SYSTEM PROVIDED HISTORY: R/O PE TECHNOLOGIST PROVIDED HISTORY: Reason for exam:->R/O PE What reading provider will be dictating this exam?->CRC FINDINGS: PERFUSION: Distribution of radiotracer is homogeneous. No segmental defects identified. VENTILATION: Ventilation images are unremarkable. CHEST CT: Small infiltrate or atelectasis posterior right lung. Negative for pulmonary embolus. CT ABDOMEN PELVIS W IV CONTRAST Additional Contrast? None    Result Date: 2/25/2022  EXAMINATION: CT OF THE ABDOMEN AND PELVIS WITH CONTRAST 2/25/2022 3:08 am TECHNIQUE: CT of the abdomen and pelvis was performed with the administration of intravenous contrast. Multiplanar reformatted images are provided for review. Dose modulation, iterative reconstruction, and/or weight based adjustment of the mA/kV was utilized to reduce the radiation dose to as low as reasonably achievable. COMPARISON: 01/17/2022 HISTORY: ORDERING SYSTEM PROVIDED HISTORY: Abdominal distention, AMS TECHNOLOGIST PROVIDED HISTORY: Reason for exam:->Abdominal distention, AMS Additional Contrast?->None Decision Support Exception - unselect if not a suspected or confirmed emergency medical condition->Emergency Medical Condition (MA) FINDINGS: Many images are partially degraded by patient motion related artifact. Low-attenuation focus in the central aspect of the left lobe of the liver as well as a tiny low-attenuation focus in the caudate lobe.   There is also a tiny low-attenuation focus in the extreme inferior aspect of the right lobe. These are difficult to definitively characterize due to their small size but likely represent small hepatic cysts. Gallbladder is unremarkable. Spleen is normal in size. No evidence of acute pancreatitis. There is a E small exophytic cystic appearing lesion along the anterior aspect of the body of the pancreas measuring 7 Hounsfield units and 1.4 cm. This is unchanged dating back to 11/17/2021. This is also favored to be incidental but could be followed. No adrenal mass. No hydronephrosis. The native kidneys remains significantly atrophic and contain numerous cystic lesions. Some of these are hyperdense but also unchanged dating back to the 11/17/2021 exam and favored to represent hyperdense or hemorrhagic cysts. Small renal cortical calcifications and or nonobstructing stones. A right lower quadrant renal transplant demonstrates no hydronephrosis. Small hiatal hernia. Mild fluid distention of a few small bowel loops in the upper abdomen. No bowel obstruction. The cecum is mobile. The appendix is not identified with certainty. There is some localized thickening of the proximal ascending colon. This does appear to be new compared to the previous examination. Moderate stool in the distal colon. Residual contrast in the colon likely due to contrast administered on the prior examination. Uterus is atrophic. Urinary bladder is largely decompressed with Chin catheter. Minimal gas in the bladder likely due to the catheter. Partial visualization of femoral to femoral bypass graft. Degenerative changes are present in the spine and pelvis. Please see separate dictated report for CT of the chest.     Thickening of the proximal ascending colon. Given the short-term change, this is favored to represent a localized area of colitis likely infectious or inflammatory.   Neoplasia thought less likely but follow-up to resolution would be recommended. Moderate stool in the distal colon. Minimal distention of several proximal small bowel loops likely incidental or due to mild enteritis. Small hiatal hernia. Other chronic appearing findings. RECOMMENDATIONS: Unavailable     IR GUIDED IVC FILTER PLACEMENT    Result Date: 2/26/2022  EXAMINATION: INFERIOR VENA CAVA (IVC) FILTER INSERTION 2/26/2022 Procedural Personnel: Amelia Sofia MD HISTORY: Indication: Bleeding with IV heparin ORDERING SYSTEM PROVIDED HISTORY: IVC filter TECHNOLOGIST PROVIDED HISTORY: Reason for exam:->IVC filter Anticoagulation contraindicated SEDATION: None CONTRAST: Contrast agent: Isovue 320 Contrast volume: 30mL FLUOROSCOPY DOSE AND TYPE OR TIME AND EXPOSURES: Fluoroscopy time/Number of Images: Fluoroscopy time 1.5 minutes. Reference [facility] [place] [facility] kerma: [id number] PROCEDURE: Informed consent for the procedure including risks, benefits and alternatives was obtained and time-out was performed prior to the procedure. Universal protocol was followed. A suitable skin site was prepared and draped using all elements of maximal sterile barrier technique including sterile gloves, sterile gown, cap, mask, large sterile sheet, sterile ultrasound probe cover, hand hygiene, and cutaneous antisepsis. Time-out was called and the patient's order and identity were verified. Local anesthesia was administered. The vessel was sonographically evaluated and judged appropriate for access. Real time ultrasound was used to visualize needle entry into the vessel and an ultrasound image was stored in PACS. Vein accessed: Right common femoral vein. Access technique: Micropuncture set with 21 gauge needle A 0.035 guide wire was used to place a catheter into the inferior vena cava. A vena cavogram was performed. A Bard Rosa filter was deployed in routine fashion in the infrarenal IVC under fluoroscopic guidance. The sheath was removed and hemostasis was achieved with manual compression. A sterile dressing was applied. Patient tolerated procedure well. Complications: No immediate complications. Standardized report: [email or web address] [id number] FINDINGS: US: The access vein is patent. Inferior Vena Cavogram: The vena cava is patent and normal in size without thrombus or anomalies. Post-placement imaging: Spot filmdemonstrates the filter in the infrarenalvena cava with less than 15 degrees tilt from the vena cava access. Successful vena cava filter placement. PLAN: Retrieval intent (MIPS): The filter is potentially retrievable. Plan/Timing For Retrieval: Ordering Physician/Contact For Retrieval Plan: [email or web address]     XR CHEST PORTABLE    Result Date: 3/9/2022  EXAMINATION: ONE XRAY VIEW OF THE CHEST 3/9/2022 10:28 am COMPARISON: 02/25/2022 HISTORY: ORDERING SYSTEM PROVIDED HISTORY: ams TECHNOLOGIST PROVIDED HISTORY: Reason for exam:->ams FINDINGS: The cardiac silhouette is at upper limits of normal in size. There are no findings of failure There is a linear focus seen within the right perihilar region favored to represent atelectasis. There is no focal consolidation seen within the right or left lung to suggest pneumonia. There is no pleural effusion. 1. Right perihilar discoid atelectasis. 2. There are no findings of failure or pneumonia. XR CHEST 1 VIEW    Result Date: 2/25/2022  EXAMINATION: ONE XRAY VIEW OF THE CHEST 2/25/2022 2:49 pm COMPARISON: None. HISTORY: ORDERING SYSTEM PROVIDED HISTORY: needs to be done with VQ scan TECHNOLOGIST PROVIDED HISTORY: Last chest Xray done more than 24 hours ago Reason for exam:->needs to be done with VQ scan FINDINGS: The heart is mildly enlarged. There are no findings of failure. The lungs are clear. There is no focal infiltrate or effusion. .     1. Mild cardiomegaly. There is no evidence of failure or pneumonia.      CTA PULMONARY W CONTRAST    Result Date: 2/25/2022  EXAMINATION: CTA OF THE CHEST 2/25/2022 3:08 am TECHNIQUE: CTA of the chest was performed after the administration of intravenous contrast.  Multiplanar reformatted images are provided for review. MIP images are provided for review. Dose modulation, iterative reconstruction, and/or weight based adjustment of the mA/kV was utilized to reduce the radiation dose to as low as reasonably achievable. COMPARISON: Portable chest dated 01/22/2022 and CT dated 01/17/2022 HISTORY: ORDERING SYSTEM PROVIDED HISTORY: Hypoxic, SOB TECHNOLOGIST PROVIDED HISTORY: Reason for exam:->Hypoxic, SOB Decision Support Exception - unselect if not a suspected or confirmed emergency medical condition->Emergency Medical Condition (MA) FINDINGS: Pulmonary Arteries: Assessment is limited due to patient motion artifact. A small or peripheral embolism would be difficult to exclude but no large or central embolism identified. Mediastinum: Moderately severe cardiomegaly is similar to previous. No pericardial effusion. No aortic dissection. Scattered vascular calcifications. Normal-size lymph nodes without adenopathy by size criteria. Lungs/pleura: No pneumothorax. Mild atelectasis in the right greater than left lung base. Otherwise, there has been significant improvement in aeration of the lungs since previous. Upper Abdomen: Partial visualization of the upper right kidney with cystic appearing foci as well as a partially visualized hyperdense lesion likely representing a hyperdense proteinaceous cyst and similar to previous but continued follow-up would be useful. Visualized portions of the upper abdomen demonstrate no acute abnormality. Small hiatal hernia. Soft Tissues/Bones: Degenerative changes are scattered in the spine. Allowing for patient motion artifact, no identified pulmonary embolism. Significant improvement in aeration of the lungs with some residual areas of presumed atelectasis in the right greater than left base. Cardiomegaly.  RECOMMENDATIONS: [facility] UPPER EXTREMITY LEFT VENOUS    Result Date: 2/27/2022  EXAMINATION: VENOUS ULTRASOUND OF THE LEFT UPPER EXTREMITY, 2/27/2022 12:55 pm TECHNIQUE: Duplex ultrasound using B-mode/gray scaled imaging and Doppler spectral analysis and color flow was obtained of the deep venous structures of the left upper extremity. COMPARISON: None. HISTORY: ORDERING SYSTEM PROVIDED HISTORY: left arm edema TECHNOLOGIST PROVIDED HISTORY: Portable please if possible Reason for exam:->left arm edema What reading provider will be dictating this exam?->CRC FINDINGS: There is normal flow and compressibility of the visualized venous structures. There is no evidence of echogenic thrombus. The veins demonstrate good compressibility with normal color flow study and spectral analysis. The AV fistula is patent. No evidence of DVT. IR ULTRASOUND GUIDANCE VASCULAR ACCESS    Result Date: 2/26/2022  EXAMINATION: INFERIOR VENA CAVA (IVC) FILTER INSERTION 2/26/2022 Procedural Personnel: Kayla Matias MD HISTORY: Indication: Bleeding with IV heparin ORDERING SYSTEM PROVIDED HISTORY: IVC filter TECHNOLOGIST PROVIDED HISTORY: Reason for exam:->IVC filter Anticoagulation contraindicated SEDATION: None CONTRAST: Contrast agent: Isovue 320 Contrast volume: 30mL FLUOROSCOPY DOSE AND TYPE OR TIME AND EXPOSURES: Fluoroscopy time/Number of Images: Fluoroscopy time 1.5 minutes. Reference [facility] kerma: [id number] PROCEDURE: Informed consent for the procedure including risks, benefits and alternatives was obtained and time-out was performed prior to the procedure. Universal protocol was followed. A suitable skin site was prepared and draped using all elements of maximal sterile barrier technique including sterile gloves, sterile gown, cap, mask, large sterile sheet, sterile ultrasound probe cover, hand hygiene, and cutaneous antisepsis. Time-out was called and the patient's order and identity were verified. Local anesthesia was administered.  The vessel was sonographically evaluated and judged appropriate for access. Real time ultrasound was used to visualize needle entry into the vessel and an ultrasound image was stored in PACS. Vein accessed: Right common femoral vein. Access technique: Micropuncture set with 21 gauge needle A 0.035 guide wire was used to place a catheter into the inferior vena cava. A vena cavogram was performed. A Bard Rosa filter was deployed in routine fashion in the infrarenal IVC under fluoroscopic guidance. The sheath was removed and hemostasis was achieved with manual compression. A sterile dressing was applied. Patient tolerated procedure well. Complications: No immediate complications. Standardized report: [id number] [id number] FINDINGS: US: The access vein is patent. Inferior Vena Cavogram: The vena cava is patent and normal in size without thrombus or anomalies. Post-placement imaging: Spot filmdemonstrates the filter in the infrarenalvena cava with less than 15 degrees tilt from the vena cava access. Successful vena cava filter placement. PLAN: Retrieval intent (MIPS): The filter is potentially retrievable. Plan/Timing For Retrieval: Ordering Physician/Contact For Retrieval Plan: Bah Magic     [place] MODIFIED BARIUM SWALLOW W VIDEO    Result Date: 2/26/2022  EXAMINATION: MODIFIED BARIUM SWALLOW WAS PERFORMED IN CONJUNCTION WITH SPEECH PATHOLOGY SERVICES TECHNIQUE: Fluoroscopic evaluation of the swallowing mechanism was performed using cineradiography with multiple consistency of barium product in conjunction with speech pathology services. FLUOROSCOPY DOSE AND TYPE OR TIME AND EXPOSURES: Fluoroscopy time 2.1 minutes. Air kerma 5.49 mGy COMPARISON: None HISTORY: ORDERING SYSTEM PROVIDED HISTORY: dysphagia, aspriation pna? TECHNOLOGIST PROVIDED HISTORY: Reason for exam:->dysphagia, aspriation pna? FINDINGS: There was oral delay and pharyngeal delay. Laryngeal elevation was adequate.  There was retention in the piriform sinuses but not the vallecula. Transient laryngeal penetration is seen with thin liquid barium. Otherwise, no aspiration or laryngeal penetration is seen. Strands it laryngeal penetration with thin liquid barium. No evidence of aspiration. Please see separate speech pathology report for full discussion of findings and recommendations. RECOMMENDATIONS: Unavailable     US DUP LOWER EXTREMITIES BILATERAL VENOUS    Result Date: 2/25/2022  EXAMINATION: DUPLEX VENOUS ULTRASOUND OF THE BILATERAL LOWER EXTREMITIES2/25/2022 2:47 pm TECHNIQUE: Duplex ultrasound using B-mode/gray scaled imaging, Doppler spectral analysis and color flow Doppler was obtained of the deep venous structures of the lower bilateral extremities. COMPARISON: None. HISTORY: ORDERING SYSTEM PROVIDED HISTORY: R/O DVT TECHNOLOGIST PROVIDED HISTORY: Reason for exam:->R/O DVT What reading provider will be dictating this exam?->CRC FINDINGS: Right lower extremity:  The visualized veins of the bilateral lower extremities are patent and free of echogenic thrombus. The veins demonstrate good compressibility with normal color flow study and spectral analysis. Left lower extremity: There are incompletely occlusive thrombi within the mid and distal left femoral vein and the popliteal vein. They have developed in the interim since the prior lower extremity Doppler from 11/17/2021.     1. INCOMPLETELY OCCLUSIVE THROMBI in the mid and distal LEFT femoral vein and within the popliteal vein. They have developed in the interim since the previous study from 11/17/2021. 2. No evidence of DVT in the right lower extremity.  RECOMMENDATIONS: Unavailable     LABS  Recent Labs     03/11/22 0827 03/12/22 0400 03/13/22  0540   WBC 13.0* 8.0 5.3   HGB 8.5* 7.4* 7.9*   HCT 30.5* 26.0* 26.9*   .3* 110.6* 108.5*    140 154     Recent Labs     03/11/22 0827 03/11/22  0827 03/12/22  0400 03/12/22  0400 03/13/22  0540     --  143  --  143   K 4.2   < > 3.7   < > 3.4*   *   < > 108*   < > 110*   CO2 23   < > 22   < > 21*   BUN 43*  --  47*  --  44*   CREATININE 2.2*  --  2.5*  --  2.6*   GFRAA 27  --  23  --  22   LABGLOM 22  --  19  --  18   GLUCOSE 75  --  89  --  91   PROT 4.8*  --  4.1*  --  4.1*   LABALBU 2.1*  --  2.0*  --  1.9*   CALCIUM 9.3  --  8.8  --  8.7   BILITOT 0.4  --  0.4  --  0.3   ALKPHOS 95  --  59  --  65   AST 15  --  12  --  13   ALT 6  --  9  --  11    < > = values in this interval not displayed. Recent Labs     03/10/22  1329   PROCAL 0.33*     Lab Results   Component Value Date    CRP 18.3 (H) 01/23/2022    CRP 34.6 (H) 11/17/2021     Lab Results   Component Value Date    SEDRATE 61 (H) 01/23/2022    SEDRATE 62 (H) 11/20/2021     No results found for: [id number]  Lab Results   Component Value Date    COVID19 Not Detected 03/02/2022    COVID19 Not Detected 02/25/2022     COVID-19/MARINA-COV2 LABS  Recent Labs     03/10/22  1329 03/11/22  0827 03/12/22  0400 03/13/22  0540   PROCAL 0.33*  --   --   --    AST  --  15 12 13   ALT  --  6 9 11      MICROBIOLOGY:          Lab Results   Component Value Date    BC 24 Hours no growth 03/09/2022    BC 5 Days no growth 02/27/2022    BC  02/25/2022     This organism was isolated in one set. Susceptibility testing is not routinely done as this  organism frequently represents skin contamination. Additional testing can be ordered by calling the  Microbiology Department.       ORG Pseudomonas aeruginosa 03/09/2022    ORG Staphylococcus coagulase-negative 02/25/2022    ORG Enterococcus faecalis 01/12/2022     Lab Results   Component Value Date    BLOODCULT2 24 Hours no growth 03/09/2022    BLOODCULT2 5 Days no growth 02/25/2022    BLOODCULT2 5 Days no growth 01/24/2022    ORG Pseudomonas aeruginosa 03/09/2022    ORG Staphylococcus coagulase-negative 02/25/2022    ORG Enterococcus faecalis 01/12/2022       WOUND/ABSCESS   Date Value Ref Range Status   01/24/2022 Growth not present  Final Urine Culture, Routine   Date Value Ref Range Status   03/09/2022 75,000 CFU/ml  Final   02/26/2022 Growth not present  Final   01/23/2022 Growth not present  Final     MRSA Culture Only   Date Value Ref Range Status   11/17/2021 Methicillin resistant Staph aureus not isolated  Final          FINAL IMPRESSION    Patient is a 73 y.o. female who presented with   Chief Complaint   Patient presents with    Altered Mental Status     from NH    and admitted for Confusion [R41.0]  Acute respiratory failure with hypoxia ([address] [J96.01]  Acute respiratory failure with hypoxemia ([address] [J96.01]     PT WITH ENCEPHALOPTHAY METABOLIC VS INFECTIOUS ETIOLOGY   H/O hsv IG G +  S/P RX ASPIRATION PNEUMONIA/COLITIS  H/O COVID  KIDNEY TRANSPLANT ON IMMUNOSUPPRESSIVE  Poss UTI Pseudomonas  On levaquin   MASTOIDITIS/SINUSITIS  CIDFF + on dificid poor oral intake start flagyl IV    CHECK BLOOD CX ngtd  CDIFF RX   F/U LABS AND CULTURES  ACYCLOVIR     follow labs cr        metronidazole (FLAGYL) 500 mg in NaCl 100 mL IVPB premix, Q8H  Fidaxomicin (DIFICID) tablet 200 mg, BID  levoFLOXacin (LEVAQUIN) 750 MG/150ML infusion 750 mg, Q48H  acyclovir (ZOVIRAX) 350 mg in dextrose 5 % 50 mL IVPB, Q8H  cycloSPORINE (SANDIMMUNE) capsule 100 mg, BID  mycophenolate (CELLCEPT) capsule 500 mg, BID  predniSONE (DELTASONE) tablet 5 mg, Daily      Imaging and labs were reviewed per medical records      Thank you for involving me in the care of Serena Washington. Please do not hesitate to call for any questions or concerns.          Electronically signed by Kaite Marshall MD on 3/13/2022 at 12:53 PM

## 2022-03-14 ENCOUNTER — APPOINTMENT (OUTPATIENT)
Dept: GENERAL RADIOLOGY | Age: 67
DRG: 177 | End: 2022-03-14
Payer: MEDICARE

## 2022-03-14 LAB
ACANTHOCYTES: ABNORMAL
ALBUMIN SERPL-MCNC: 2 G/DL (ref 3.5–5.2)
ALP BLD-CCNC: 55 U/L (ref 35–104)
ALT SERPL-CCNC: 9 U/L (ref 0–32)
ANION GAP SERPL CALCULATED.3IONS-SCNC: 11 MMOL/L (ref 7–16)
ANISOCYTOSIS: ABNORMAL
AST SERPL-CCNC: 9 U/L (ref 0–31)
BASOPHILS ABSOLUTE: 0.02 E9/L (ref 0–0.2)
BASOPHILS RELATIVE PERCENT: 0.4 % (ref 0–2)
BILIRUB SERPL-MCNC: 0.3 MG/DL (ref 0–1.2)
BLOOD CULTURE, ROUTINE: NORMAL
BUN BLDV-MCNC: 40 MG/DL (ref 6–23)
BURR CELLS: ABNORMAL
CALCIUM SERPL-MCNC: 8.4 MG/DL (ref 8.6–10.2)
CHLORIDE BLD-SCNC: 110 MMOL/L (ref 98–107)
CO2: 19 MMOL/L (ref 22–29)
CREAT SERPL-MCNC: 2.5 MG/DL (ref 0.5–1)
CULTURE, BLOOD 2: NORMAL
EOSINOPHILS ABSOLUTE: 0.16 E9/L (ref 0.05–0.5)
EOSINOPHILS RELATIVE PERCENT: 3.4 % (ref 0–6)
GFR AFRICAN AMERICAN: 23
GFR NON-AFRICAN AMERICAN: 19 ML/MIN/1.73
GLUCOSE BLD-MCNC: 89 MG/DL (ref 74–99)
HCT VFR BLD CALC: 27.4 % (ref 34–48)
HEMOGLOBIN: 8.1 G/DL (ref 11.5–15.5)
IMMATURE GRANULOCYTES #: 0.1 E9/L
IMMATURE GRANULOCYTES %: 2.1 % (ref 0–5)
LYMPHOCYTES ABSOLUTE: 0.61 E9/L (ref 1.5–4)
LYMPHOCYTES RELATIVE PERCENT: 13.1 % (ref 20–42)
MCH RBC QN AUTO: 32.3 PG (ref 26–35)
MCHC RBC AUTO-ENTMCNC: 29.6 % (ref 32–34.5)
MCV RBC AUTO: 109.2 FL (ref 80–99.9)
MONOCYTES ABSOLUTE: 0.46 E9/L (ref 0.1–0.95)
MONOCYTES RELATIVE PERCENT: 9.9 % (ref 2–12)
NEUTROPHILS ABSOLUTE: 3.32 E9/L (ref 1.8–7.3)
NEUTROPHILS RELATIVE PERCENT: 71.1 % (ref 43–80)
OVALOCYTES: ABNORMAL
PDW BLD-RTO: 15.7 FL (ref 11.5–15)
PLATELET # BLD: 163 E9/L (ref 130–450)
PMV BLD AUTO: 13.2 FL (ref 7–12)
POIKILOCYTES: ABNORMAL
POLYCHROMASIA: ABNORMAL
POTASSIUM SERPL-SCNC: 3.4 MMOL/L (ref 3.5–5)
RBC # BLD: 2.51 E12/L (ref 3.5–5.5)
SODIUM BLD-SCNC: 140 MMOL/L (ref 132–146)
TOTAL PROTEIN: 4 G/DL (ref 6.4–8.3)
WBC # BLD: 4.7 E9/L (ref 4.5–11.5)

## 2022-03-14 PROCEDURE — 6370000000 HC RX 637 (ALT 250 FOR IP): Performed by: INTERNAL MEDICINE

## 2022-03-14 PROCEDURE — 2500000003 HC RX 250 WO HCPCS: Performed by: INTERNAL MEDICINE

## 2022-03-14 PROCEDURE — 6360000002 HC RX W HCPCS: Performed by: SPECIALIST

## 2022-03-14 PROCEDURE — 6360000002 HC RX W HCPCS: Performed by: INTERNAL MEDICINE

## 2022-03-14 PROCEDURE — 94640 AIRWAY INHALATION TREATMENT: CPT

## 2022-03-14 PROCEDURE — 71045 X-RAY EXAM CHEST 1 VIEW: CPT

## 2022-03-14 PROCEDURE — 80053 COMPREHEN METABOLIC PANEL: CPT

## 2022-03-14 PROCEDURE — 36592 COLLECT BLOOD FROM PICC: CPT

## 2022-03-14 PROCEDURE — 2700000000 HC OXYGEN THERAPY PER DAY

## 2022-03-14 PROCEDURE — 6370000000 HC RX 637 (ALT 250 FOR IP): Performed by: SPECIALIST

## 2022-03-14 PROCEDURE — 51702 INSERT TEMP BLADDER CATH: CPT

## 2022-03-14 PROCEDURE — 85025 COMPLETE CBC W/AUTO DIFF WBC: CPT

## 2022-03-14 PROCEDURE — 2500000003 HC RX 250 WO HCPCS: Performed by: SPECIALIST

## 2022-03-14 PROCEDURE — 2580000003 HC RX 258: Performed by: SPECIALIST

## 2022-03-14 PROCEDURE — APPSS30 APP SPLIT SHARED TIME 16-30 MINUTES: Performed by: PHYSICIAN ASSISTANT

## 2022-03-14 PROCEDURE — 36415 COLL VENOUS BLD VENIPUNCTURE: CPT

## 2022-03-14 PROCEDURE — 1200000000 HC SEMI PRIVATE

## 2022-03-14 PROCEDURE — 74018 RADEX ABDOMEN 1 VIEW: CPT

## 2022-03-14 PROCEDURE — 2580000003 HC RX 258: Performed by: INTERNAL MEDICINE

## 2022-03-14 RX ORDER — POTASSIUM CHLORIDE 20 MEQ/1
20 TABLET, EXTENDED RELEASE ORAL ONCE
Status: DISCONTINUED | OUTPATIENT
Start: 2022-03-14 | End: 2022-03-23 | Stop reason: HOSPADM

## 2022-03-14 RX ORDER — IPRATROPIUM BROMIDE AND ALBUTEROL SULFATE 2.5; .5 MG/3ML; MG/3ML
1 SOLUTION RESPIRATORY (INHALATION) EVERY 4 HOURS
Status: DISCONTINUED | OUTPATIENT
Start: 2022-03-14 | End: 2022-03-23 | Stop reason: HOSPADM

## 2022-03-14 RX ADMIN — POTASSIUM CHLORIDE, DEXTROSE MONOHYDRATE AND SODIUM CHLORIDE: 150; 5; 900 INJECTION, SOLUTION INTRAVENOUS at 18:58

## 2022-03-14 RX ADMIN — ATORVASTATIN CALCIUM 10 MG: 10 TABLET, FILM COATED ORAL at 23:08

## 2022-03-14 RX ADMIN — POTASSIUM CHLORIDE, DEXTROSE MONOHYDRATE AND SODIUM CHLORIDE: 150; 5; 900 INJECTION, SOLUTION INTRAVENOUS at 15:38

## 2022-03-14 RX ADMIN — HEPARIN 100 UNITS: 100 SYRINGE at 23:06

## 2022-03-14 RX ADMIN — HEPARIN 100 UNITS: 100 SYRINGE at 10:52

## 2022-03-14 RX ADMIN — APIXABAN 5 MG: 5 TABLET, FILM COATED ORAL at 23:08

## 2022-03-14 RX ADMIN — METRONIDAZOLE 500 MG: 500 INJECTION, SOLUTION INTRAVENOUS at 23:09

## 2022-03-14 RX ADMIN — METRONIDAZOLE 500 MG: 500 INJECTION, SOLUTION INTRAVENOUS at 17:40

## 2022-03-14 RX ADMIN — CYCLOSPORINE 100 MG: 25 CAPSULE, GELATIN COATED ORAL at 23:07

## 2022-03-14 RX ADMIN — MYCOPHENOLATE MOFETIL 500 MG: 250 CAPSULE ORAL at 23:08

## 2022-03-14 RX ADMIN — Medication 10 ML: at 23:08

## 2022-03-14 RX ADMIN — ACYCLOVIR SODIUM 350 MG: 50 INJECTION, SOLUTION INTRAVENOUS at 23:02

## 2022-03-14 RX ADMIN — CLOZAPINE 50 MG: 25 TABLET ORAL at 23:07

## 2022-03-14 RX ADMIN — IPRATROPIUM BROMIDE AND ALBUTEROL SULFATE 1 AMPULE: .5; 2.5 SOLUTION RESPIRATORY (INHALATION) at 22:11

## 2022-03-14 RX ADMIN — METRONIDAZOLE 500 MG: 500 INJECTION, SOLUTION INTRAVENOUS at 05:04

## 2022-03-14 RX ADMIN — ACYCLOVIR SODIUM 350 MG: 50 INJECTION, SOLUTION INTRAVENOUS at 01:55

## 2022-03-14 RX ADMIN — IPRATROPIUM BROMIDE AND ALBUTEROL SULFATE 1 AMPULE: .5; 2.5 SOLUTION RESPIRATORY (INHALATION) at 18:16

## 2022-03-14 RX ADMIN — ACYCLOVIR SODIUM 350 MG: 50 INJECTION, SOLUTION INTRAVENOUS at 10:57

## 2022-03-14 RX ADMIN — DOCUSATE SODIUM 100 MG: 50 LIQUID ORAL at 23:08

## 2022-03-14 RX ADMIN — PANTOPRAZOLE SODIUM 40 MG: 40 TABLET, DELAYED RELEASE ORAL at 06:21

## 2022-03-14 RX ADMIN — LEVOTHYROXINE SODIUM 50 MCG: 0.05 TABLET ORAL at 06:21

## 2022-03-14 RX ADMIN — Medication 10 ML: at 10:51

## 2022-03-14 RX ADMIN — FIDAXOMICIN 200 MG: 200 TABLET, FILM COATED ORAL at 23:07

## 2022-03-14 ASSESSMENT — PAIN SCALES - GENERAL
PAINLEVEL_OUTOF10: 0

## 2022-03-14 ASSESSMENT — PAIN SCALES - WONG BAKER: WONGBAKER_NUMERICALRESPONSE: 0

## 2022-03-14 NOTE — PROGRESS NOTES
Dr. Ray Chacon MD,    Your patient is on a medication that requires a renal dose adjustment. Renal Function Assessment:    Date Body Weight IBW  Adjusted BW SCr  CrCl Dialysis status   3/14/2022 220 lb (99.8 kg)  Ideal body weight: 50.1 kg (110 lb 7.2 oz)  Adjusted ideal body weight: 70 kg (154 lb 4.3 oz) Serum creatinine: 2.5 mg/dL (H) 03/14/22 0510  Estimated creatinine clearance: 25 mL/min (A) N/a       Recent Labs     03/13/22  0540 03/13/22  1708 03/14/22  0510   CREATININE 2.6* 2.6* 2.5*       Estimated Creatinine Clearance: 25 mL/min (A) (based on SCr of 2.5 mg/dL (H)). Pharmacy has renally dose-adjusted the following medication(s):    Date Original Order Renally Adjusted Order   3/14/2022 Acyclovir  mg every 8 hours Acyclovir  mg every 12 hours   Discussed with ID, Dr. Tasha Rosales, agreed to above changes. These changes were made per protocol according to the [facility] Renal Function-Based Dose Adjustments Policy    *Please note this dose may need readjusted if your patient's renal function significantly improves. Please contact pharmacy with any questions regarding these changes. Leonides Hernandez PharmD.   3/14/2022   3:59 PM

## 2022-03-14 NOTE — PROGRESS NOTES
[facility]     [address] [place] [address]  [place], [address]  Phone [phone]   [phone]      Admit Date: 3/9/2022 10:02 AM  Pt Name: Sebastian Reed  MRN: [id number]  : 1955  Reason for Consult:    Chief Complaint   Patient presents with    Altered Mental Status     from NH     Requesting Physician:  Amy Oliveros MD  PCP: Salima Infante MD  History Obtained From:  patient, chart   ID consulted for Confusion [R41.0]  Acute respiratory failure with hypoxia ([address] [J96.01]  Acute respiratory failure with hypoxemia ([address] [J96.01]  on hospital day [address] [place]       Chief Complaint   Patient presents with    Altered Mental Status     from NH     HISTORYOF PRESENT ILLNESS   Sebastian Reed is a 73 y.o. female who presents with   has a past medical history of Abnormal EKG, Acute gout involving toe of right foot, Acute on chronic combined systolic (congestive) and diastolic (congestive) heart failure ([address] Cancer ([address] Cerebrovascular disease, Clotting disorder ([address] Depression, Hemodialysis patient ([address] History of blood transfusion, Hyperlipidemia, Hypertension, Hypothyroidism, Leg swelling, Lymphedema of both lower extremities, Peripheral vascular disease ([address] Renal failure, Thrombophlebitis, and Thyroid disease. Altered Mental Status      Know to ID   · Last seen on 3/2/2022-d/c on Augmentin cover poss aspiration and colitis  Pt came in due to altered mental status. Wbc24.7 hgb10.8 ryv970 cr1.6 TMAX99.3  PT WAS SEEN AND EXAMINED WITH NURSES PRESENT  PT IS NOT AROUSABLE UNABLE TO OBTAIN HISTORY    PT HAS SOFT STOOLS SOME ODOR  SACRAL AREA VIEWED HAS SMALL DTI AREA  CXRY 1. Right perihilar discoid atelectasis. 2. There are no findings of failure or pneumonia.    LEFT SHOULDER No fracture.  Increased separation of the humeral head from the osseous   glenoid and AC joint, which could represent subluxation.  Otherwise, a large   synovial effusion, hematoma, or mass cannot be excluded.  Clinical   correlation is recommended.  CT scan of the left shoulder can be performed   for further evaluation.  Cardiomegaly. LEFT HUMERUS No fracture or dislocation.  Increased separation of the humeral head from   the osseous glenoid and AC joint, which could represent subluxation.  A large   synovial effusion, hematoma, or mass cannot be excluded.  CT scan of the left   shoulder can be considered for further evaluation. piperacillin-tazobactam (ZOSYN) 3,375 mg in dextrose 5 % 50 mL IVPB extended infusion (mini-bag), Q8H  vancomycin (VANCOCIN) oral solution 125 mg, 4 times per day  cycloSPORINE (SANDIMMUNE) capsule 100 mg, BID  mycophenolate (CELLCEPT) capsule 500 mg, BID  metoprolol succinate (TOPROL XL) extended release tablet 50 mg, Daily  predniSONE (DELTASONE) tablet 5 mg, Daily     LAST BLOOD CX  cons   CLEMENCIA  Summary   Technically difficult study. No definate evidence of vegetation consistent with endocarditis. Normal LV segmental wall motion. Ejection fraction is visually estimated at 60 to 65%. Normal right ventricular size and function.     DOS  22   In bed more awake has ngt  afebrile 2L cr2.95 wbc4.7     3/13/2022  In bed briefly arouses   Does not stay awake   Spoke with nurse unable to give meds    3/12/2022  briefly arouses has no abd pain    3/11/2022  In bed open eyes briefly still lethargic  cdiff +  REVIEW OF SYSTEMS     CONSTITUTIONAL:   AS IN HPI     Medications Prior to Admission: pantoprazole (PROTONIX) 40 MG tablet, Take 1 tablet by mouth every morning (before breakfast)  [] amoxicillin-clavulanate (AUGMENTIN) 875-125 MG per tablet, Take 1 tablet by mouth every 12 hours for 10 days  apixaban (ELIQUIS) 5 MG TABS tablet, Take 5 mg by mouth 2 times daily Take in the morning and at bedtime for 3 months (started 2022)  atorvastatin (LIPITOR) 10 MG tablet, Take 10 mg by mouth daily  lisinopril (PRINIVIL;ZESTRIL) 5 MG tablet, take 1 tablet by mouth once daily  pregabalin (LYRICA) 75 MG capsule,   nystatin (MYCOSTATIN) 376749 UNIT/GM powder, Apply 3 times daily. ipratropium-albuterol (DUONEB) 0.5-2.5 (3) MG/3ML SOLN nebulizer solution, Inhale 3 mLs into the lungs every 6 hours as needed for Shortness of Breath  ondansetron (ZOFRAN-ODT) 4 MG disintegrating tablet, Take 1 tablet by mouth every 8 hours as needed for Nausea or Vomiting  allopurinol (ZYLOPRIM) 100 MG tablet, Take 100 mg by mouth daily  Cyanocobalamin (B-12 COMPLIANCE INJECTION) 1000 MCG/ML KIT, Inject as directed monthly  metoprolol succinate (TOPROL XL) 50 MG extended release tablet, take 1 tablet by mouth once daily  desvenlafaxine succinate (PRISTIQ) 50 MG TB24 extended release tablet, Take 1 tablet by mouth every evening  cloZAPine (CLOZARIL) 50 MG tablet, Take 50 mg by mouth nightly  cycloSPORINE (SANDIMMUNE) 100 MG capsule, Take 100 mg by mouth 2 times daily  predniSONE (DELTASONE) 5 MG tablet, Take 5 mg by mouth daily  folic acid (FOLVITE) 1 MG tablet, Take 1 mg by mouth daily  docusate sodium (COLACE) 100 MG capsule, Take 100 mg by mouth 2 times daily  mycophenolate (CELLCEPT) 500 MG tablet, Take 1,000 mg by mouth 2 times daily  levothyroxine (SYNTHROID) 50 MCG tablet, Take 50 mcg by mouth daily.     budesonide (PULMICORT) 0.5 MG/2ML nebulizer suspension, Take 2 mLs by nebulization 2 times daily for 10 days  CURRENT MEDICATIONS     Current Facility-Administered Medications:     potassium chloride (KLOR-CON M) extended release tablet 20 mEq, 20 mEq, Oral, Once, Susannah Ramirez MD    docusate (COLACE) 50 MG/5ML liquid 100 mg, 100 mg, Per NG tube, BID, Susannah Ramirez MD    ipratropium-albuterol (DUONEB) nebulizer solution 1 ampule, 1 ampule, Inhalation, Q4H, Marta Vega MD    metronidazole (FLAGYL) 500 mg in NaCl 100 mL IVPB premix, 500 mg, IntraVENous, Q8H, Claudette Crazier, MD, Stopped at 03/14/22 0604    dextrose 5 % and 0.9 % NaCl with KCl 20 mEq infusion, , IntraVENous, Continuous, Yolanda Dominique MD, Last Rate: 100 mL/hr at 03/13/22 1706, New Bag at 03/13/22 1706    midodrine (PROAMATINE) tablet 5 mg, 5 mg, Oral, BID PRN, Gal Celis MD    lidocaine 1 % injection 5 mL, 5 mL, IntraDERmal, Once, Gal Celis MD    sodium chloride flush 0.9 % injection 5-40 mL, 5-40 mL, IntraVENous, 2 times per day, Gal Celis MD, 10 mL at 03/14/22 1051    sodium chloride flush 0.9 % injection 5-40 mL, 5-40 mL, IntraVENous, PRN, Gal Celis MD    0.9 % sodium chloride infusion, 25 mL, IntraVENous, PRN, Gal Celis MD    heparin flush 100 UNIT/ML injection 100 Units, 1 mL, IntraVENous, 2 times per day, Gal Celis MD, 100 Units at 03/14/22 1052    heparin flush 100 UNIT/ML injection 100 Units, 1 mL, IntraCATHeter, PRN, Gal Celis MD    [Held by provider] metoprolol tartrate (LOPRESSOR) tablet 12.5 mg, 12.5 mg, Oral, BID, Helio Garibay MD    Fidaxomicin (DIFICID) tablet 200 mg, 200 mg, Oral, BID, Jose D Bonds MD, 200 mg at 03/13/22 2237    levoFLOXacin (LEVAQUIN) 750 MG/150ML infusion 750 mg, 750 mg, IntraVENous, Q48H, Jose D Bonds MD, Stopped at 03/13/22 1859    acyclovir (ZOVIRAX) 350 mg in dextrose 5 % 50 mL IVPB, 5 mg/kg (Adjusted), IntraVENous, Q8H, Jose D Bonds MD, Stopped at 03/14/22 1200    apixaban (ELIQUIS) tablet 5 mg, 5 mg, Oral, BID, aGl Celis MD, 5 mg at 03/13/22 2238    desvenlafaxine succinate (PRISTIQ) extended release tablet 50 mg, 50 mg, Oral, Daily, Susannah Ramirez MD, 50 mg at 03/12/22 1222    ondansetron (ZOFRAN-ODT) disintegrating tablet 4 mg, 4 mg, Oral, Q8H PRN, Saima Ramirez MD    atorvastatin (LIPITOR) tablet 10 mg, 10 mg, Oral, Nightly, Gal Celis MD, 10 mg at 03/13/22 2237    cloZAPine (CLOZARIL) tablet 50 mg, 50 mg, Oral, Nightly, Susannah Ramirez MD, 50 mg at 03/13/22 2236    levothyroxine (SYNTHROID) tablet 50 mcg, 50 mcg, Oral, Daily, Saima Ramirez MD, 50 mcg at 03/14/22 0621    pantoprazole (PROTONIX) tablet 40 mg, 40 mg, Oral, QAM AC, Gary Nguyen MD, 40 mg at 03/14/22 4535    cycloSPORINE (SANDIMMUNE) capsule 100 mg, 100 mg, Oral, BID, Gary Nguyen MD, 100 mg at 03/13/22 2237    mycophenolate (CELLCEPT) capsule 500 mg, 500 mg, Oral, BID, Gary Nguyen MD, 500 mg at 03/13/22 2238    predniSONE (DELTASONE) tablet 5 mg, 5 mg, Oral, Daily, Gary Nguyen MD, 5 mg at 03/12/22 4075    allopurinol (ZYLOPRIM) tablet 100 mg, 100 mg, Oral, Daily, Gary Nguyen MD, 100 mg at 50/23/00 2574    folic acid (FOLVITE) tablet 1 mg, 1 mg, Oral, Daily, Gary Nguyen MD, 1 mg at 03/12/22 7952  ALLERGIES     Macrodantin [nitrofurantoin macrocrystal] and Sulfa antibiotics  Immunization History   Administered Date(s) Administered    COVID-19, Pfizer Purple top, DILUTE for use, 12+ yrs, 30mcg/0.3mL dose 01/28/2021, 02/18/2021, 08/18/2021      Internal Administration   First Dose COVID-19, [employer] top, DILUTE for use, 12+ yrs, 30mcg/0.3mL dose  01/28/2021   Second Dose COVID-19, Pfizer Purple top, DILUTE for use, 12+ yrs, 30mcg/0.3mL dose   02/18/2021       Last COVID Lab SARS-CoV-2 (no units)   Date Value   01/20/2021 Not Detected     SARS-CoV-2 Antibody, Total (no units)   Date Value   01/13/2022 Non-Reactive     SARS-CoV-2, PCR (no units)   Date Value   02/25/2022 Not Detected     SARS-CoV-2, NAAT (no units)   Date Value   03/02/2022 Not Detected            PAST MEDICAL HISTORY     Past Medical History:   Diagnosis Date    Abnormal EKG     left bundle branch block    Acute gout involving toe of right foot 09/03/2020    Acute on chronic combined systolic (congestive) and diastolic (congestive) heart failure (HCC)     Cancer (HCC)     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    Cerebrovascular disease     Clotting disorder ([facility] 1985    thrombophlebitis after c section delivery    Depression     15 years followed by dr Dmitriy Arenas Hemodialysis [facility] ([facility]     History of blood transfusion     Hyperlipidemia     Hypertension     Hypothyroidism     Leg swelling 09/03/2020    Lymphedema of both lower extremities 09/03/2020    Peripheral vascular disease ([facility]     Renal failure 11/2012    renal transplant    Thrombophlebitis     after c section delivery    Thyroid disease      SURGICAL HISTORY       Past Surgical History:   Procedure Laterality Date   [address]    one normal delivery    COLECTOMY N/A 1/15/2022    DIAGNOSTIC  LAPAROSCOPY LYSIS OF ADHESIONS performed by Jen Jeronimo MD at [address] CATH LAB PROCEDURE  2006    normal coronaries and 1996 normal coronaries    DIALYSIS FISTULA CREATION  march and july 2012    phase one and two patient will start dialysis when needed   [address]    transfemoral venous bypass grafts    IR IVC FILTER PLACEMENT W IMAGING  2/26/2022    IR IVC FILTER PLACEMENT W IMAGING 2/26/2022 [address] SPECIAL PROCEDURES    KIDNEY TRANSPLANT  2016    KIDNEY TRANSPLANT  2015    KIDNEY TRANSPLANT  2015    PARATHYROIDECTOMY  2006    left parathyroid  implant and parathyroidectomy    TRANSESOPHAGEAL ECHOCARDIOGRAM N/A 2/28/2022    TRANSESOPHAGEAL ECHOCARDIOGRAM WITH BUBBLE STUDY performed by Perez Renteria MD at [facility]     ·  [address]:    03/13/22 1241 03/13/22 2015 03/13/22 2046 03/14/22 1015   BP: (!) 97/52 (!) 132/55  (!) 117/59   Pulse:  91 93 97   Resp:  14  16   Temp:  97.3 °F (36.3 °C)  98.7 °F (37.1 °C)   TempSrc:  Axillary  Axillary   SpO2:  95%  97%   Weight:       Height:         Physical Exam   Constitutional/General:  NAD in bed   Head: NC/AT  Neck: Supple, full ROM,    Pulmonary: Lungs DEC  to auscultation bilaterally. ON O2   Cardiovascular:  Regular rate and rhythm  Abdomen: Soft, + BS.    distension. Nontender.   ngt  Extremities:  Warm and well perfused  Skin: Warm and dry    Neurologic: AROUSable   MERCHANT   3/11 midline   piv     DIAGNOSTIC RESULTS   RADIOLOGY:   ECHO Transesophageal    Result Date: 3/1/2022  Transesophageal Echocardiography Report (CLEMENCIA)   Demographics   Patient Name      Lacy Martinez       Gender              Female                    [address] [facility] Record    [id number]           Room Number         9053  Number   Account #         [de-identified]          Procedure Date      02/28/2022   Corporate ID                         Ordering Physician  Hardeep Carlton MD   Accession Number  [id number]         Referring Physician   Date of Birth     1955         Sonographer         Jack Garcesffin                                                           [facility]   Age               73 year(s)         Interpreting        Hardeep Carlton MD                                       Physician                                        Any Other  Procedure Type of Study   CLEMENCIA procedure:Transesophageal Echo (CLEMENCIA), Color Flow Velocity Mapping. Procedure Date Date: 02/28/2022 Start: 11:00 AM Study Location: Portable Technical Quality: Adequate visualization Indications:R/O Endocarditis. Patient Status: Routine Height: 62 inches Weight: 220 pounds BSA: 1.99 m^2 BMI: 40.24 kg/m^2 BP: 138/79 mmHg CLEMENCIA Performed By: the attending and the sonographer  Type of Anesthesia: Moderate sedation   Findings   Left Ventricle  Normal LV segmental wall motion. Ejection fraction is visually estimated at 60 to 65%. Right Ventricle  Normal right ventricular size and function. Left Atrium  No evidence of thrombus within left atrium. Right Atrium  Normal right atrium size. Agitated saline injected for shunt evaluation shows no shunt. Mitral Valve  No vegetation. Mild mitral regurgitation is present. Tricuspid Valve  No vegetation. Aortic Valve  No vegetation. The aortic valve appears mildly sclerotic. Pulmonic Valve  No vegetation. Conclusions   Summary  Technically difficult study.   No definate evidence of vegetation consistent with endocarditis. Normal LV segmental wall motion. Ejection fraction is visually estimated at 60 to 65%. Normal right ventricular size and function. Signature   ----------------------------------------------------------------  Electronically signed by Paula Navarro MD(Interpreting  physician) on 03/01/2022 09:11 AM  ----------------------------------------------------------------  [email or web address] [id number]    CT HEAD WO CONTRAST    Result Date: 2/25/2022  EXAMINATION: CT OF THE HEAD WITHOUT CONTRAST  2/25/2022 3:08 am TECHNIQUE: CT of the head was performed without the administration of intravenous contrast. Dose modulation, iterative reconstruction, and/or weight based adjustment of the mA/kV was utilized to reduce the radiation dose to as low as reasonably achievable. COMPARISON: Correlation with MRI dated 01/16/2022 HISTORY: ORDERING SYSTEM PROVIDED HISTORY: mental status change TECHNOLOGIST PROVIDED HISTORY: Has a \"code stroke\" or \"stroke alert\" been called? ->No Reason for exam:->mental status change Decision Support Exception - unselect if not a suspected or confirmed emergency medical condition->Emergency Medical Condition (MA) FINDINGS: BRAIN/VENTRICLES: There is no acute intracranial hemorrhage, mass effect or midline shift. No abnormal extra-axial fluid collection. The gray-white differentiation is maintained without evidence of an acute infarct. There is no evidence of hydrocephalus. Minimal cortical volume loss. Scattered vascular calcifications. ORBITS: The visualized portion of the orbits demonstrate no acute abnormality. SINUSES: Significant opacification of the left greater than right mastoid air cells similar to previous. Mild mucosal thickening in the left sphenoid sinus. This was also present previously.  SOFT TISSUES/SKULL:  No acute abnormality of the visualized skull or soft tissues. No acute intracranial abnormality. MRI would be useful if symptoms persist. Inflammatory changes of left greater than right mastoid air cells and left sphenoid sinus similar to prior MRI. RECOMMENDATIONS: Unavailable     NM LUNG VENT/PERFUSION (VQ)    Result Date: 2/25/2022  EXAMINATION: NUCLEAR MEDICINE VENTILATION PERFUSION SCAN. 2/25/2022 TECHNIQUE: 3.5 millicuries aerosolized Tc99m DTPA was administered via mask prior to planar imaging of the lungs in multiple projections. Then, 4.2 millicuries of Tc 47U MAA was administered intravenously prior to planar imaging of the lungs in similar projections. COMPARISON: Chest CT February 25, 2022 . HISTORY: ORDERING SYSTEM PROVIDED HISTORY: R/O PE TECHNOLOGIST PROVIDED HISTORY: Reason for exam:->R/O PE What reading provider will be dictating this exam?->CRC FINDINGS: PERFUSION: Distribution of radiotracer is homogeneous. No segmental defects identified. VENTILATION: Ventilation images are unremarkable. CHEST CT: Small infiltrate or atelectasis posterior right lung. Negative for pulmonary embolus. CT ABDOMEN PELVIS W IV CONTRAST Additional Contrast? None    Result Date: 2/25/2022  EXAMINATION: CT OF THE ABDOMEN AND PELVIS WITH CONTRAST 2/25/2022 3:08 am TECHNIQUE: CT of the abdomen and pelvis was performed with the administration of intravenous contrast. Multiplanar reformatted images are provided for review. Dose modulation, iterative reconstruction, and/or weight based adjustment of the mA/kV was utilized to reduce the radiation dose to as low as reasonably achievable.  COMPARISON: 01/17/2022 HISTORY: ORDERING SYSTEM PROVIDED HISTORY: Abdominal distention, AMS TECHNOLOGIST PROVIDED HISTORY: Reason for exam:->Abdominal distention, AMS Additional Contrast?->None Decision Support Exception - unselect if not a suspected or confirmed emergency medical condition->Emergency Medical Condition (MA) FINDINGS: Many images are partially degraded by patient motion related artifact. Low-attenuation focus in the central aspect of the left lobe of the liver as well as a tiny low-attenuation focus in the caudate lobe. There is also a tiny low-attenuation focus in the extreme inferior aspect of the right lobe. These are difficult to definitively characterize due to their small size but likely represent small hepatic cysts. Gallbladder is unremarkable. Spleen is normal in size. No evidence of acute pancreatitis. There is a E small exophytic cystic appearing lesion along the anterior aspect of the body of the pancreas measuring 7 Hounsfield units and 1.4 cm. This is unchanged dating back to 11/17/2021. This is also favored to be incidental but could be followed. No adrenal mass. No hydronephrosis. The native kidneys remains significantly atrophic and contain numerous cystic lesions. Some of these are hyperdense but also unchanged dating back to the 11/17/2021 exam and favored to represent hyperdense or hemorrhagic cysts. Small renal cortical calcifications and or nonobstructing stones. A right lower quadrant renal transplant demonstrates no hydronephrosis. Small hiatal hernia. Mild fluid distention of a few small bowel loops in the upper abdomen. No bowel obstruction. The cecum is mobile. The appendix is not identified with certainty. There is some localized thickening of the proximal ascending colon. This does appear to be new compared to the previous examination. Moderate stool in the distal colon. Residual contrast in the colon likely due to contrast administered on the prior examination. Uterus is atrophic. Urinary bladder is largely decompressed with Chin catheter. Minimal gas in the bladder likely due to the catheter. Partial visualization of femoral to femoral bypass graft. Degenerative changes are present in the spine and pelvis.  Please see separate dictated report for CT of the chest.     Thickening of the proximal ascending colon. Given the short-term change, this is favored to represent a localized area of colitis likely infectious or inflammatory. Neoplasia thought less likely but follow-up to resolution would be recommended. Moderate stool in the distal colon. Minimal distention of several proximal small bowel loops likely incidental or due to mild enteritis. Small hiatal hernia. Other chronic appearing findings. RECOMMENDATIONS: Unavailable     IR GUIDED IVC FILTER PLACEMENT    Result Date: 2/26/2022  EXAMINATION: INFERIOR VENA CAVA (IVC) FILTER INSERTION 2/26/2022 Procedural Personnel: Florina Diaz MD HISTORY: Indication: Bleeding with IV heparin ORDERING SYSTEM PROVIDED HISTORY: IVC filter TECHNOLOGIST PROVIDED HISTORY: Reason for exam:->IVC filter Anticoagulation contraindicated SEDATION: None CONTRAST: Contrast agent: Isovue 320 Contrast volume: 30mL FLUOROSCOPY DOSE AND TYPE OR TIME AND EXPOSURES: Fluoroscopy time/Number of Images: Fluoroscopy time 1.5 minutes. Reference [facility] kerma: [id number] PROCEDURE: Informed consent for the procedure including risks, benefits and alternatives was obtained and time-out was performed prior to the procedure. Universal protocol was followed. A suitable skin site was prepared and draped using all elements of maximal sterile barrier technique including sterile gloves, sterile gown, cap, mask, large sterile sheet, sterile ultrasound probe cover, hand hygiene, and cutaneous antisepsis. Time-out was called and the patient's order and identity were verified. Local anesthesia was administered. The vessel was sonographically evaluated and judged appropriate for access. Real time ultrasound was used to visualize needle entry into the vessel and an ultrasound image was stored in PACS. Vein accessed: Right common femoral vein. Access technique: Micropuncture set with 21 gauge needle A 0.035 guide wire was used to place a catheter into the inferior vena cava.  A vena cavogram was performed. A [employer] [place] filter was deployed in routine fashion in the infrarenal IVC under fluoroscopic guidance. The sheath was removed and hemostasis was achieved with manual compression. A sterile dressing was applied. Patient tolerated procedure well. Complications: No immediate complications. Standardized report: [id number] [id number] FINDINGS: US: The access vein is patent. Inferior Vena Cavogram: The vena cava is patent and normal in size without thrombus or anomalies. Post-placement imaging: Spot filmdemonstrates the filter in the infrarenalvena cava with less than 15 degrees tilt from the vena cava access. Successful vena cava filter placement. PLAN: Retrieval intent (MIPS): The filter is potentially retrievable. Plan/Timing For Retrieval: Ordering Physician/Contact For Retrieval Plan: Alyson Webster     XR CHEST PORTABLE    Result Date: 3/9/2022  EXAMINATION: ONE XRAY VIEW OF THE CHEST 3/9/2022 10:28 am COMPARISON: 02/25/2022 HISTORY: ORDERING SYSTEM PROVIDED HISTORY: ams TECHNOLOGIST PROVIDED HISTORY: Reason for exam:->ams FINDINGS: The cardiac silhouette is at upper limits of normal in size. There are no findings of failure There is a linear focus seen within the right perihilar region favored to represent atelectasis. There is no focal consolidation seen within the right or left lung to suggest pneumonia. There is no pleural effusion. 1. Right perihilar discoid atelectasis. 2. There are no findings of failure or pneumonia. XR CHEST 1 VIEW    Result Date: 2/25/2022  EXAMINATION: ONE XRAY VIEW OF THE CHEST 2/25/2022 2:49 pm COMPARISON: None. HISTORY: ORDERING SYSTEM PROVIDED HISTORY: needs to be done with VQ scan TECHNOLOGIST PROVIDED HISTORY: Last chest Xray done more than 24 hours ago Reason for exam:->needs to be done with VQ scan FINDINGS: The heart is mildly enlarged. There are no findings of failure. The lungs are clear.   There is no focal infiltrate or effusion. .     1. Mild cardiomegaly. There is no evidence of failure or pneumonia. CTA PULMONARY W CONTRAST    Result Date: 2/25/2022  EXAMINATION: CTA OF THE CHEST 2/25/2022 3:08 am TECHNIQUE: CTA of the chest was performed after the administration of intravenous contrast.  Multiplanar reformatted images are provided for review. MIP images are provided for review. Dose modulation, iterative reconstruction, and/or weight based adjustment of the mA/kV was utilized to reduce the radiation dose to as low as reasonably achievable. COMPARISON: Portable chest dated 01/22/2022 and CT dated 01/17/2022 HISTORY: ORDERING SYSTEM PROVIDED HISTORY: Hypoxic, SOB TECHNOLOGIST PROVIDED HISTORY: Reason for exam:->Hypoxic, SOB Decision Support Exception - unselect if not a suspected or confirmed emergency medical condition->Emergency Medical Condition (MA) FINDINGS: Pulmonary Arteries: Assessment is limited due to patient motion artifact. A small or peripheral embolism would be difficult to exclude but no large or central embolism identified. Mediastinum: Moderately severe cardiomegaly is similar to previous. No pericardial effusion. No aortic dissection. Scattered vascular calcifications. Normal-size lymph nodes without adenopathy by size criteria. Lungs/pleura: No pneumothorax. Mild atelectasis in the right greater than left lung base. Otherwise, there has been significant improvement in aeration of the lungs since previous. Upper Abdomen: Partial visualization of the upper right kidney with cystic appearing foci as well as a partially visualized hyperdense lesion likely representing a hyperdense proteinaceous cyst and similar to previous but continued follow-up would be useful. Visualized portions of the upper abdomen demonstrate no acute abnormality. Small hiatal hernia. Soft Tissues/Bones: Degenerative changes are scattered in the spine. Allowing for patient motion artifact, no identified pulmonary embolism. Significant improvement in aeration of the lungs with some residual areas of presumed atelectasis in the right greater than left base. Cardiomegaly. RECOMMENDATIONS: Unavailable     US DUP UPPER EXTREMITY LEFT VENOUS    Result Date: 2/27/2022  EXAMINATION: VENOUS ULTRASOUND OF THE LEFT UPPER EXTREMITY, 2/27/2022 12:55 pm TECHNIQUE: Duplex ultrasound using B-mode/gray scaled imaging and Doppler spectral analysis and color flow was obtained of the deep venous structures of the left upper extremity. COMPARISON: None. HISTORY: ORDERING SYSTEM PROVIDED HISTORY: left arm edema TECHNOLOGIST PROVIDED HISTORY: Portable please if possible Reason for exam:->left arm edema What reading provider will be dictating this exam?->CRC FINDINGS: There is normal flow and compressibility of the visualized venous structures. There is no evidence of echogenic thrombus. The veins demonstrate good compressibility with normal color flow study and spectral analysis. The AV fistula is patent. No evidence of DVT. IR ULTRASOUND GUIDANCE VASCULAR ACCESS    Result Date: 2/26/2022  EXAMINATION: INFERIOR VENA CAVA (IVC) FILTER INSERTION 2/26/2022 Procedural Personnel: Antonio Ortez MD HISTORY: Indication: Bleeding with IV heparin ORDERING SYSTEM PROVIDED HISTORY: IVC filter TECHNOLOGIST PROVIDED HISTORY: Reason for exam:->IVC filter Anticoagulation contraindicated SEDATION: None CONTRAST: Contrast agent: Isovue 320 Contrast volume: 30mL FLUOROSCOPY DOSE AND TYPE OR TIME AND EXPOSURES: Fluoroscopy time/Number of Images: Fluoroscopy time 1.5 minutes. Reference [facility] Garvin kerma: [id number] PROCEDURE: Informed consent for the procedure including risks, benefits and alternatives was obtained and time-out was performed prior to the procedure. Universal protocol was followed.  A suitable skin site was prepared and draped using all elements of maximal sterile barrier technique including sterile gloves, sterile gown, cap, mask, large sterile sheet, sterile ultrasound probe cover, hand hygiene, and cutaneous antisepsis. Time-out was called and the patient's order and identity were verified. Local anesthesia was administered. The vessel was sonographically evaluated and judged appropriate for access. Real time ultrasound was used to visualize needle entry into the vessel and an ultrasound image was stored in PACS. Vein accessed: Right common femoral vein. Access technique: Micropuncture set with 21 gauge needle A 0.035 guide wire was used to place a catheter into the inferior vena cava. A vena cavogram was performed. A Bard Stevens filter was deployed in routine fashion in the infrarenal IVC under fluoroscopic guidance. The sheath was removed and hemostasis was achieved with manual compression. A sterile dressing was applied. Patient tolerated procedure well. Complications: No immediate complications. Standardized report: [id number] [id number] FINDINGS: US: The access vein is patent. Inferior Vena Cavogram: The vena cava is patent and normal in size without thrombus or anomalies. Post-placement imaging: Spot filmdemonstrates the filter in the infrarenalvena cava with less than 15 degrees tilt from the vena cava access. Successful vena cava filter placement. PLAN: Retrieval intent (MIPS): The filter is potentially retrievable. Plan/Timing For Retrieval: Ordering Physician/Contact For Retrieval Plan: Alyson GUERRIER MODIFIED BARIUM SWALLOW W VIDEO    Result Date: 2/26/2022  EXAMINATION: MODIFIED BARIUM SWALLOW WAS PERFORMED IN CONJUNCTION WITH SPEECH PATHOLOGY SERVICES TECHNIQUE: Fluoroscopic evaluation of the swallowing mechanism was performed using cineradiography with multiple consistency of barium product in conjunction with speech pathology services. FLUOROSCOPY DOSE AND TYPE OR TIME AND EXPOSURES: Fluoroscopy time 2.1 minutes.   Air kerma 5.49 mGy COMPARISON: None HISTORY: ORDERING SYSTEM PROVIDED HISTORY: dysphagia, aspriation pna? TECHNOLOGIST PROVIDED HISTORY: Reason for exam:->dysphagia, aspriation pna? FINDINGS: There was oral delay and pharyngeal delay. Laryngeal elevation was adequate. There was retention in the piriform sinuses but not the vallecula. Transient laryngeal penetration is seen with thin liquid barium. Otherwise, no aspiration or laryngeal penetration is seen. Strands it laryngeal penetration with thin liquid barium. No evidence of aspiration. Please see separate speech pathology report for full discussion of findings and recommendations. RECOMMENDATIONS: Unavailable     US DUP LOWER EXTREMITIES BILATERAL VENOUS    Result Date: 2/25/2022  EXAMINATION: DUPLEX VENOUS ULTRASOUND OF THE BILATERAL LOWER EXTREMITIES2/25/2022 2:47 pm TECHNIQUE: Duplex ultrasound using B-mode/gray scaled imaging, Doppler spectral analysis and color flow Doppler was obtained of the deep venous structures of the lower bilateral extremities. COMPARISON: None. HISTORY: ORDERING SYSTEM PROVIDED HISTORY: R/O DVT TECHNOLOGIST PROVIDED HISTORY: Reason for exam:->R/O DVT What reading provider will be dictating this exam?->CRC FINDINGS: Right lower extremity:  The visualized veins of the bilateral lower extremities are patent and free of echogenic thrombus. The veins demonstrate good compressibility with normal color flow study and spectral analysis. Left lower extremity: There are incompletely occlusive thrombi within the mid and distal left femoral vein and the popliteal vein. They have developed in the interim since the prior lower extremity Doppler from 11/17/2021.     1. INCOMPLETELY OCCLUSIVE THROMBI in the mid and distal LEFT femoral vein and within the popliteal vein. They have developed in the interim since the previous study from 11/17/2021. 2. No evidence of DVT in the right lower extremity.  RECOMMENDATIONS: Unavailable     LABS  Recent Labs     03/12/22  0400 03/13/22  0540 03/14/22  0510   WBC 8.0 5.3 4.7   HGB 7.4* 7.9* 8.1*   HCT 26.0* 26.9* 27.4*   .6* 108.5* 109.2*    154 163     Recent Labs     03/12/22  0400 03/12/22  0400 03/13/22  0540 03/13/22  0540 03/13/22  1708 03/13/22  1708 03/14/22  0510      < > 143  --  140  --  140   K 3.7   < > 3.4*   < > 3.4*   < > 3.4*   *   < > 110*   < > 107   < > 110*   CO2 22   < > 21*   < > 21*   < > 19*   BUN 47*   < > 44*  --  43*  --  40*   CREATININE 2.5*   < > 2.6*  --  2.6*  --  2.5*   GFRAA 23   < > 22  --  22  --  23   LABGLOM 19   < > 18  --  18  --  19   GLUCOSE 89   < > 91  --  110*  --  89   PROT 4.1*  --  4.1*  --   --   --  4.0*   LABALBU 2.0*  --  1.9*  --   --   --  2.0*   CALCIUM 8.8   < > 8.7  --  9.0  --  8.4*   BILITOT 0.4  --  0.3  --   --   --  0.3   ALKPHOS 59  --  65  --   --   --  55   AST 12  --  13  --   --   --  9   ALT 9  --  11  --   --   --  9    < > = values in this interval not displayed. No results for input(s): PROCAL in the last 72 hours. Lab Results   Component Value Date    CRP 18.3 (H) 01/23/2022    CRP 34.6 (H) 11/17/2021     Lab Results   Component Value Date    SEDRATE 61 (H) 01/23/2022    SEDRATE 62 (H) 11/20/2021     No results found for: [id number]  Lab Results   Component Value Date    COVID19 Not Detected 03/02/2022    COVID19 Not Detected 02/25/2022     COVID-19/MARINA-COV2 LABS  Recent Labs     03/12/22 0400 03/13/22  0540 03/14/22  0510   AST 12 13 9   ALT 9 11 9      MICROBIOLOGY:          Lab Results   Component Value Date    BC 5 Days no growth 03/09/2022    BC 5 Days no growth 02/27/2022    BC  02/25/2022     This organism was isolated in one set. Susceptibility testing is not routinely done as this  organism frequently represents skin contamination. Additional testing can be ordered by calling the  Microbiology Department.       ORG Pseudomonas aeruginosa 03/09/2022    ORG Staphylococcus coagulase-negative 02/25/2022    ORG Enterococcus faecalis 01/12/2022     Lab Results   Component Value Date BLOODCULT2 5 Days no growth 03/09/2022    BLOODCULT2 5 Days no growth 02/25/2022    BLOODCULT2 5 Days no growth 01/24/2022    ORG Pseudomonas aeruginosa 03/09/2022    ORG Staphylococcus coagulase-negative 02/25/2022    ORG Enterococcus faecalis 01/12/2022       WOUND/ABSCESS   Date Value Ref Range Status   01/24/2022 Growth not present  Final        Urine Culture, Routine   Date Value Ref Range Status   03/09/2022 75,000 CFU/ml  Final   02/26/2022 Growth not present  Final   01/23/2022 Growth not present  Final     MRSA Culture Only   Date Value Ref Range Status   11/17/2021 Methicillin resistant Staph aureus not isolated  Final          FINAL IMPRESSION    Patient is a 73 y.o. female who presented with   Chief Complaint   Patient presents with    Altered Mental Status     from NH    and admitted for Confusion [R41.0]  Acute respiratory failure with hypoxia ([address] [J96.01]  Acute respiratory failure with hypoxemia ([address] [J96.01]     PT WITH ENCEPHALOPTHAY METABOLIC VS INFECTIOUS ETIOLOGY   H/O hsv IG G +  S/P RX ASPIRATION PNEUMONIA/COLITIS  H/O COVID  KIDNEY TRANSPLANT ON IMMUNOSUPPRESSIVE  Poss UTI Pseudomonas  On levaquin   MASTOIDITIS/SINUSITIS  CIDFF + on dificid poor oral intake start flagyl IV    CHECK BLOOD CX ngtd  CDIFF RX   F/U LABS AND CULTURES  ACYCLOVIR        ipratropium-albuterol (DUONEB) nebulizer solution 1 ampule, Q4H  metronidazole (FLAGYL) 500 mg in NaCl 100 mL IVPB premix, Q8H  Fidaxomicin (DIFICID) tablet 200 mg, BID  levoFLOXacin (LEVAQUIN) 750 MG/150ML infusion 750 mg, Q48H  acyclovir (ZOVIRAX) 350 mg in dextrose 5 % 50 mL IVPB, Q8H         follow labs cr         Imaging and labs were reviewed per medical records      Thank you for involving me in the care of Arturo Garcia. Please do not hesitate to call for any questions or concerns.          Electronically signed by Jose Maharaj MD on 3/14/2022 at 3:10 PM

## 2022-03-14 NOTE — PROGRESS NOTES
Speech Language Pathology      NAME:  Priti Negrete  :  1955  DATE: 3/14/2022  ROOM:  [facility]/2813-38    Attempted ongoing Speech-Language Pathology intervention for dysphagia. Pt unavailable at this time due to:  [] HOLD per RN/ medical staff d/t medical status   [] Off unit for testing/ procedure    [] With medical staff   [] Declined intervention  [x] Sleeping/ Lethargic now has NG tube recommend continue NPO due to lethargy   [] Other:     SLP to continue previously established POC and re-attempt as able.         Confusion [R41.0]  Acute respiratory failure with hypoxia ([address] [J96.01]  Acute respiratory failure with hypoxemia ([address] [J96.01]        Radha Victor MSCCC/SLP  Speech Language Pathologist  [phone]

## 2022-03-14 NOTE — PLAN OF CARE
Problem: Falls - Risk of:  Goal: Absence of physical injury  Description: Absence of physical injury  Outcome: Met This Shift     Problem: Mental Status - Impaired:  Goal: Mental status will improve  Description: Mental status will improve  3/14/2022 0144 by Aleksandr Blackmon RN  Outcome: Not Met This Shift     Problem: Mental Status - Impaired:  Goal: Mental status will improve  Description: Mental status will improve  3/14/2022 0144 by Aleksandr Blackmon RN  Outcome: Not Met This Shift

## 2022-03-14 NOTE — CARE COORDINATION
Ss note: 3/14/2022.10:06 AM No covid testing. Will NEED RAPID COVID on day of discharge. Pt has been accepted at [facility] however, per IDR today, pt has NG tube, has picc line, is on 2 liters of oxygen, confusion. Per nursing pt is lethargic today. SW spoke with liaison Padmini Koch today at [facility] whom relays if pt remains confused MAY need [facility] secure unit- this would need to be discussed with the the son Letty Lund (pt is from [place] [id number] and rehab however son Letty Lund requested different snf.)  NO PRECERT Will need updated therapy notes and final abx . Pt has [place] medicaid secondary. HENS was initiated for [facility] Son Letty Lund will need notified when discharge is written.  AG Knutson

## 2022-03-14 NOTE — PROGRESS NOTES
Room #:   3110/9796-36    Date: 3/14/2022       Patient Name: Aleena Rosales  : 1955      MRN: [id number]     Patient unavailable for physical therapy treatment due to unable to arouse patient in any fashion. Will attempt PT treatment at a later time. Thank you.        Sue Beckwith, PT

## 2022-03-14 NOTE — PROGRESS NOTES
Pulmonary/Critical Care Progress Note    We are following patient for hypoxemia, urinary tract infection with Pseudomonas, COPD, status post cadaveric kidney transplant, history of recalcitrant C. difficile colitis, paroxysmal atrial fibrillation, possible aspiration pneumonitis    SUBJECTIVE:  Patient is lethargic and I wonder this is in any way secondary to her clozapine and desvenlafaxine or just as her baseline. She will require incentive spirometry every 2 hours while awake and even through the night if possible. Aerosolized bronchodilators will be initiated as well. He will be important to keep the patient moving to avoid any further lung congestion or pneumonia.     MEDICATIONS:   potassium chloride  20 mEq Oral Once    docusate  100 mg Per NG tube BID    ipratropium-albuterol  1 ampule Inhalation Q4H    metroNIDAZOLE  500 mg IntraVENous Q8H    lidocaine  5 mL IntraDERmal Once    sodium chloride flush  5-40 mL IntraVENous 2 times per day    heparin flush  1 mL IntraVENous 2 times per day    [Held by provider] metoprolol tartrate  12.5 mg Oral BID    Fidaxomicin  200 mg Oral BID    levofloxacin  750 mg IntraVENous Q48H    acyclovir  5 mg/kg (Adjusted) IntraVENous Q8H    apixaban  5 mg Oral BID    desvenlafaxine succinate  50 mg Oral Daily    atorvastatin  10 mg Oral Nightly    cloZAPine  50 mg Oral Nightly    levothyroxine  50 mcg Oral Daily    pantoprazole  40 mg Oral QAM AC    cycloSPORINE  100 mg Oral BID    mycophenolate  500 mg Oral BID    predniSONE  5 mg Oral Daily    allopurinol  100 mg Oral Daily    folic acid  1 mg Oral Daily      dextrose 5% and 0.9% NaCl with KCl 20 mEq 100 mL/hr at 03/13/22 1706    sodium chloride       midodrine, sodium chloride flush, sodium chloride, heparin flush, ondansetron      REVIEW OF SYSTEMS:  Patient is too lethargic to respond to questions regarding review of systems    OBJECTIVE:  Vitals:    03/14/22 1015   BP: (!) 117/59   Pulse: 97 Resp: 16   Temp: 98.7 °F (37.1 °C)   SpO2: 97%        O2 Flow Rate (L/min): 2 L/min  O2 Device: Nasal cannula    PHYSICAL EXAM:  Constitutional: No fever, chills, diaphoresis  Skin: No skin rash, no skin breakdown  HEENT: Unremarkable  Neck: No JVD, lymphadenopathy, thyromegaly  Cardiovascular: S1, S2 mostly regular. No S3 murmurs rubs present  Respiratory: Few inspiratory crackles at bases. No wheezes anteriorly  Gastrointestinal: Soft, obese, nontender  Genitourinary: No grossly bloody urine  Extremities: No clubbing, cyanosis, or edema  Neurological: Moves extremities.   No obvious focal deficits seen  Psychological: Close to evaluate given her depressed mental status    LABS:  WBC   Date Value Ref Range Status   03/14/2022 4.7 4.5 - 11.5 E9/L Final   03/13/2022 5.3 4.5 - 11.5 E9/L Final   03/12/2022 8.0 4.5 - 11.5 E9/L Final     Hemoglobin   Date Value Ref Range Status   03/14/2022 8.1 (L) 11.5 - 15.5 g/dL Final   03/13/2022 7.9 (L) 11.5 - 15.5 g/dL Final   03/12/2022 7.4 (L) 11.5 - 15.5 g/dL Final     Hematocrit   Date Value Ref Range Status   03/14/2022 27.4 (L) 34.0 - 48.0 % Final   03/13/2022 26.9 (L) 34.0 - 48.0 % Final   03/12/2022 26.0 (L) 34.0 - 48.0 % Final     MCV   Date Value Ref Range Status   03/14/2022 109.2 (H) 80.0 - 99.9 fL Final   03/13/2022 108.5 (H) 80.0 - 99.9 fL Final   03/12/2022 110.6 (H) 80.0 - 99.9 fL Final     Platelets   Date Value Ref Range Status   03/14/2022 163 130 - 450 E9/L Final   03/13/2022 154 130 - 450 E9/L Final   03/12/2022 140 130 - 450 E9/L Final     Sodium   Date Value Ref Range Status   03/14/2022 140 132 - 146 mmol/L Final   03/13/2022 140 132 - 146 mmol/L Final   03/13/2022 143 132 - 146 mmol/L Final     Potassium   Date Value Ref Range Status   03/14/2022 3.4 (L) 3.5 - 5.0 mmol/L Final   03/13/2022 3.4 (L) 3.5 - 5.0 mmol/L Final   03/13/2022 3.4 (L) 3.5 - 5.0 mmol/L Final     Potassium reflex Magnesium   Date Value Ref Range Status   03/09/2022 4.7 3.5 - 5.0 mmol/L Final   02/26/2022 4.7 3.5 - 5.0 mmol/L Final     Comment:     Specimen is moderately Hemolyzed. Result may be artificially increased.    02/25/2022 4.2 3.5 - 5.0 mmol/L Final     Chloride   Date Value Ref Range Status   03/14/2022 110 (H) 98 - 107 mmol/L Final   03/13/2022 107 98 - 107 mmol/L Final   03/13/2022 110 (H) 98 - 107 mmol/L Final     CO2   Date Value Ref Range Status   03/14/2022 19 (L) 22 - 29 mmol/L Final   03/13/2022 21 (L) 22 - 29 mmol/L Final   03/13/2022 21 (L) 22 - 29 mmol/L Final     BUN   Date Value Ref Range Status   03/14/2022 40 (H) 6 - 23 mg/dL Final   03/13/2022 43 (H) 6 - 23 mg/dL Final   03/13/2022 44 (H) 6 - 23 mg/dL Final     CREATININE   Date Value Ref Range Status   03/14/2022 2.5 (H) 0.5 - 1.0 mg/dL Final   03/13/2022 2.6 (H) 0.5 - 1.0 mg/dL Final   03/13/2022 2.6 (H) 0.5 - 1.0 mg/dL Final     Glucose   Date Value Ref Range Status   03/14/2022 89 74 - 99 mg/dL Final   03/13/2022 110 (H) 74 - 99 mg/dL Final   03/13/2022 91 74 - 99 mg/dL Final     Calcium   Date Value Ref Range Status   03/14/2022 8.4 (L) 8.6 - 10.2 mg/dL Final   03/13/2022 9.0 8.6 - 10.2 mg/dL Final   03/13/2022 8.7 8.6 - 10.2 mg/dL Final     Total Protein   Date Value Ref Range Status   03/14/2022 4.0 (L) 6.4 - 8.3 g/dL Final   03/13/2022 4.1 (L) 6.4 - 8.3 g/dL Final   03/12/2022 4.1 (L) 6.4 - 8.3 g/dL Final     Albumin   Date Value Ref Range Status   03/14/2022 2.0 (L) 3.5 - 5.2 g/dL Final   03/13/2022 1.9 (L) 3.5 - 5.2 g/dL Final   03/12/2022 2.0 (L) 3.5 - 5.2 g/dL Final     Total Bilirubin   Date Value Ref Range Status   03/14/2022 0.3 0.0 - 1.2 mg/dL Final   03/13/2022 0.3 0.0 - 1.2 mg/dL Final   03/12/2022 0.4 0.0 - 1.2 mg/dL Final     Alkaline Phosphatase   Date Value Ref Range Status   03/14/2022 55 35 - 104 U/L Final   03/13/2022 65 35 - 104 U/L Final   03/12/2022 59 35 - 104 U/L Final     AST   Date Value Ref Range Status   03/14/2022 9 0 - 31 U/L Final   03/13/2022 13 0 - 31 U/L Final   03/12/2022 12 0 - 31 U/L Final     ALT   Date Value Ref Range Status   03/14/2022 9 0 - 32 U/L Final   03/13/2022 11 0 - 32 U/L Final   03/12/2022 9 0 - 32 U/L Final     GFR Non-   Date Value Ref Range Status   03/14/2022 19 >=60 mL/min/1.73 Final     Comment:     Chronic Kidney Disease: less than 60 ml/min/1.73 sq.m. Kidney Failure: less than 15 ml/min/1.73 sq.m. Results valid for patients 18 years and older. 03/13/2022 18 >=60 mL/min/1.73 Final     Comment:     Chronic Kidney Disease: less than 60 ml/min/1.73 sq.m. Kidney Failure: less than 15 ml/min/1.73 sq.m. Results valid for patients 18 years and older. 03/13/2022 18 >=60 mL/min/1.73 Final     Comment:     Chronic Kidney Disease: less than 60 ml/min/1.73 sq.m. Kidney Failure: less than 15 ml/min/1.73 sq.m. Results valid for patients 18 years and older. GFR    Date Value Ref Range Status   03/14/2022 23  Final   03/13/2022 22  Final   03/13/2022 22  Final     Magnesium   Date Value Ref Range Status   03/01/2022 1.9 1.6 - 2.6 mg/dL Final   02/28/2022 2.1 1.6 - 2.6 mg/dL Final   02/27/2022 1.5 (L) 1.6 - 2.6 mg/dL Final     Phosphorus   Date Value Ref Range Status   03/13/2022 4.6 (H) 2.5 - 4.5 mg/dL Final   03/01/2022 2.9 2.5 - 4.5 mg/dL Final   02/28/2022 3.5 2.5 - 4.5 mg/dL Final     Recent Labs     03/13/22  0919   PH 7.262*   PO2 87.2   PCO2 44.0   HCO3 19.4*   BE -7.2*   O2SAT 95.9       RADIOLOGY:  XR CHEST PORTABLE   Final Result   Addendum 1 of 1   ADDENDUM:   The floor reports slow nasogastric tube output. Based on positioning on   plain film, recommend further advancement. Final   1. Possible right pleural effusion and right basilar atelectasis. 2. Cardiomegaly without pulmonary venous hypertension. 3. Possible hiatal hernia. XR ABDOMEN FOR NG/OG/NE TUBE PLACEMENT   Final Result   Enteric tube tip in the fundus of the stomach.          US RETROPERITONEAL COMPLETE Final Result   No hydronephrosis of the transplant kidney. The bladder is decompressed by a Chin catheter. IR FLUORO GUIDED CVA DEVICE PLMT/REPLACE/REMOVAL   Final Result   Successful placement of a dual lumen power PICC line. The tip of the PICC   line catheter was placed within the distal right subclavian vein. Leonardo Velazquez IMPRESSION:   Successful ultrasound and fluoroscopy guided PICC placement         IR ULTRASOUND GUIDANCE VASCULAR ACCESS   Final Result   Successful placement of a dual lumen power PICC line. The tip of the PICC   line catheter was placed within the distal right subclavian vein. Leonardo Velazquez IMPRESSION:   Successful ultrasound and fluoroscopy guided PICC placement         CT HEAD WO CONTRAST   Final Result   No acute intracranial abnormality or hemorrhage. Bilateral mastoiditis and right sphenoid sinusitis. XR SHOULDER LEFT (MIN 2 VIEWS)   Final Result   No fracture. Increased separation of the humeral head from the osseous   glenoid and AC joint, which could represent subluxation. Otherwise, a large   synovial effusion, hematoma, or mass cannot be excluded. Clinical   correlation is recommended. CT scan of the left shoulder can be performed   for further evaluation. Cardiomegaly. XR HUMERUS LEFT (MIN 2 VIEWS)   Final Result   No fracture or dislocation. Increased separation of the humeral head from   the osseous glenoid and AC joint, which could represent subluxation. A large   synovial effusion, hematoma, or mass cannot be excluded. CT scan of the left   shoulder can be considered for further evaluation. XR CHEST PORTABLE   Final Result   1. Right perihilar discoid atelectasis. 2. There are no findings of failure or pneumonia.                  PROBLEM LIST:  Principal Problem:    Acute respiratory failure with hypoxia (HCC)  Active Problems:    Kidney transplanted    Elevated troponin    Paroxysmal atrial fibrillation ([address] Hypotension    LBBB (left bundle branch block)    History of DVT (deep vein thrombosis)    Chronic anticoagulation    Acute respiratory failure with hypoxemia (HCC)  Resolved Problems:    * No resolved hospital problems. *      IMPRESSION:  1. Probable atelectasis  2. Question aspiration pneumonitis  3. History of paroxysmal atrial fibrillation  4. History of DVT on anticoagulants  5. History of cadaveric kidney transplant    PLAN:  1. Important to add incentive spirometry around-the-clock  2. Aerosolized bronchodilators around-the-clock  3. Continue O2 as needed.   Currently on 2 L with saturation of 97%          Electronically signed by Caity Taylor MD on 3/14/2022 at 2:39 PM

## 2022-03-14 NOTE — PROGRESS NOTES
Unable to administer medication d/t NG being clogged. Unable to advance NG per order. Charge nurse aware.

## 2022-03-14 NOTE — CARE COORDINATION
LVM for duong Mims explaining IMM letter again and requested a call back.  Electronically signed by Diallo Francis on 3/14/2022 at 9:03 AM

## 2022-03-14 NOTE — PROGRESS NOTES
Inpatient Cardiology Progress note     PATIENT IS BEING FOLLOWED FOR:johntakeren Palmer located in  room [id number] is a 73 y.o. female      SUBJECTIVE: Continue to be lethargic. Does not respond to verbal stimuli. Respond to gentle chest rubbing with frown but does not open eyes. OBJECTIVE: No apparent distress         PHYSICAL EXAM:   BP (!) 132/55   Pulse 93   Temp 97.3 °F (36.3 °C) (Axillary)   Resp 14   Ht 5' 2\" (1.575 m)   Wt 220 lb (99.8 kg)   SpO2 95%   BMI 40.24 kg/m²    B/P Range last 24 hours: Systolic (39APT), JTP:06 , Min:65 , BXA:007    Diastolic (64PHY), EHH:67, Min:39, Max:55    CONST: Obese. Lethargic  HEENT:   Head- Normocephalic, atraumatic   Eyes- Conjunctivae pink, anicteric  Nasogastric tube in situ. Neck-  No stridor, trachea midline, no jugular venous distention. No carotid bruit  CHEST: Chest symmetrical and apparently non-tender to palpation. No accessory muscle use or intercostal retractions  RESPIRATORY:  Lung sounds -diffuse rhonchi. CARDIOVASCULAR:     Heart Inspection- shows no noted pulsations  Heart Ausculation- Regular rate and rhythm, no murmur. No s3, s4 or rub   PV: Lateral lower extremity edema extremity +3. No varicosities. Pedal pulses not palpable, no clubbing or cyanosis   ABDOMEN: Obese, soft, apparent non-tender to light palpation. Bowel sounds present. MS: Lethargic. No atrophy or abnormal movements.    : Chin in  SKIN: Warm and dry no statis dermatitis or ulcers   NEURO / PSYCH: Lethargic      Intake/Output Summary (Last 24 hours) at 3/14/2022 0809  Last data filed at 3/14/2022 5598  Gross per 24 hour   Intake 512.57 ml   Output 395 ml   Net 117.57 ml       Weight:   Wt Readings from Last 3 Encounters:   03/09/22 220 lb (99.8 kg)   02/25/22 220 lb (99.8 kg)   01/19/22 216 lb (98 kg)     Current Inpatient Medications:   metroNIDAZOLE  500 mg IntraVENous Q8H    lidocaine  5 mL IntraDERmal Once    sodium chloride flush 5-40 mL IntraVENous 2 times per day    heparin flush  1 mL IntraVENous 2 times per day    [Held by provider] metoprolol tartrate  12.5 mg Oral BID    Fidaxomicin  200 mg Oral BID    levofloxacin  750 mg IntraVENous Q48H    acyclovir  5 mg/kg (Adjusted) IntraVENous Q8H    apixaban  5 mg Oral BID    desvenlafaxine succinate  50 mg Oral Daily    atorvastatin  10 mg Oral Nightly    cloZAPine  50 mg Oral Nightly    [Held by provider] lisinopril  5 mg Oral Daily    levothyroxine  50 mcg Oral Daily    pantoprazole  40 mg Oral QAM AC    docusate sodium  100 mg Oral BID    cycloSPORINE  100 mg Oral BID    mycophenolate  500 mg Oral BID    predniSONE  5 mg Oral Daily    allopurinol  100 mg Oral Daily    folic acid  1 mg Oral Daily       IV Infusions (if any):   dextrose 5% and 0.9% NaCl with KCl 20 mEq 100 mL/hr at 03/13/22 1706    sodium chloride         DIAGNOSTIC/ LABORATORY DATA:  Labs:   CBC:   Recent Labs     03/13/22  0540 03/14/22  0510   WBC 5.3 4.7   HGB 7.9* 8.1*   HCT 26.9* 27.4*    163     BMP:   Recent Labs     03/13/22  1708 03/14/22  0510    140   K 3.4* 3.4*   CO2 21* 19*   BUN 43* 40*   CREATININE 2.6* 2.5*   LABGLOM 18 19   CALCIUM 9.0 8.4*     Mag: No results for input(s): MG in the last 72 hours. Phos:   Recent Labs     03/13/22  0540   PHOS 4.6*     TSH: No results for input(s): TSH in the last 72 hours. HgA1c:   Lab Results   Component Value Date    LABA1C 5.7 (H) 11/17/2021     No results found for: EAG    BNP: No results for input(s): BNP in the last 72 hours. PT/INR: No results for input(s): PROTIME, INR in the last 72 hours. APTT:No results for input(s): APTT in the last 72 hours. CARDIAC ENZYMES:No results for input(s): CKTOTAL, CKMB, CKMBINDEX, TROPONINI in the last 72 hours.   FASTING LIPID PANEL:No results found for: CHOL, HDL, LDLDIRECT, LDLCALC, TRIG  LIVER PROFILE:  Recent Labs     03/13/22  0540 03/14/22  0510   AST 13 9   ALT 11 9   LABALBU 1.9* 2.0* No results for input(s): CKTOTAL, CKMB, CKMBINDEX, TROPHS in the last 72 hours. Lab Results   Component Value Date    CREATININE 2.5 03/14/2022    CREATININE 2.6 03/13/2022    CREATININE 2.6 03/13/2022    CREATININE 2.5 03/12/2022    CREATININE 2.2 03/11/2022    CREATININE 1.7 03/10/2022    CREATININE 1.6 03/09/2022    CREATININE 1.3 03/02/2022    CREATININE 1.6 03/01/2022    CREATININE 1.6 02/28/2022    CREATININE 1.7 02/27/2022    CREATININE 1.6 02/26/2022    CREATININE 1.7 02/25/2022    CREATININE 1.9 01/24/2022    CREATININE 1.8 01/23/2022    CREATININE 1.9 01/22/2022    CREATININE 2.0 01/22/2022    CREATININE 1.8 01/22/2022    CREATININE 1.6 01/21/2022    CREATININE 1.6 01/20/2022     Lab Results   Component Value Date    PROBNP 1,646 03/09/2022    PROBNP 1,013 02/25/2022    PROBNP 2,701 01/18/2022    PROBNP 2,082 11/19/2021    PROBNP 2,666 11/17/2021         Telemetry: Normal sinus rhythm with singular PVCs    Recently diagnosed with C. difficile. ASSESSMENT:       1. Elevated troponin: Flat pattern, secondary to comorbid conditions  2. Atrial fibrillation: Paroxysmal, now in sinus rhythm, no chronic anticoagulation with Eliquis  3. Hypotension: Secondary to sepsis and dehydration? Stable on IV fluids and midodrine  4. Abnormal EKG with chronic left bundle branch block  5. S/p kidney transplant  6. Acute kidney injury/chronic kidney disease  7. Hypokalemia  8. Anemia  9. History of DVT s/p IVC filter  10. Altered mental status  11. Chronic anticoagulation  12. Immunosuppression since kidney transplant  13. C. difficile positive  14. Morbid obesity, BMI 40.24 2 g/m²      PLAN:  Continue current therapy  Continue electrolyte check and correction, BMP daily  CBC daily  Cardiology signing off, please do not detect to contact us with questions. Follow-up with Dr. Kalina Blackmon after discharge.     Assessment and plan discussed with Dr. Mary Carroll MD    Assessment and Plan to follow as per Dr. Mary Carroll, MD    Electronically signed by CHAD Bolden on 3/14/2022 at 8:09 AM

## 2022-03-14 NOTE — PROGRESS NOTES
[facility] MD Madelaine Martines MD Ronne Coil, MD Olegario Moellers MD  Progress Note    Patient's Name: Osmel Escobar  5:26 PM  3/14/2022    Subjective  3/13 : seen in her room  Weak decondioned lying flat in bed , poor po intake , clinically euvolemic   3/14: NG tube not functioning, just replaced. No meds all day as a result. IV fluids been stopped for few hours, as well. History of Present Ilness:      Patient has h/o preserved left ventricular systolic function (CLEMENCIA  7/42/3702), stress test with no significant reversibility 6/16/2015, paroxysmal atrial fibrillation (on Eliquis), chronic left bundle branch block, HTN, HLD,  s/p kidney transplant,CKD, H/o DVT, s/p IVC on AC now, PVD, s/p thyroidectomy for cancer, gout, obesity     She was recently treated in hospitalfrom 2/25/2020 to 3/7/2022 because of acute hypoxic respiratory failure likely due to aspiration pneumonia with positive blood culture and acute metabolic encephalopathy     Patient was brought from [address] [place] [address] and rehab facility to ED of [facility] on 3/9/22 due to new mental status changes. Nephrology asked to see for CECILY /CKD   Pt has hx of cadaveric kidney transplant 7 years back   Her baseline cr has been 1.2-1.6 with a lot of fluctuation in contest of dehydration . Pt seen in her room , she is very weak and deconditoned to my eyes she has lost a lot of weight .    She has a hassan in place   She has poor po intake   She is euvolemic to mildly hypovolemic       Allergies:  Macrodantin [nitrofurantoin macrocrystal] and Sulfa antibiotics    Current Medications:    potassium chloride (KLOR-CON M) extended release tablet 20 mEq, Once  docusate (COLACE) 50 MG/5ML liquid 100 mg, BID  ipratropium-albuterol (DUONEB) nebulizer solution 1 ampule, Q4H  acyclovir (ZOVIRAX) 350 mg in dextrose 5 % 50 mL IVPB, Q12H  metronidazole (FLAGYL) 500 mg in NaCl 100 mL IVPB premix, Q8H  dextrose 5 % and 0.9 % NaCl with KCl 20 mEq infusion, Continuous  midodrine (PROAMATINE) tablet 5 mg, BID PRN  lidocaine 1 % injection 5 mL, Once  sodium chloride flush 0.9 % injection 5-40 mL, 2 times per day  sodium chloride flush 0.9 % injection 5-40 mL, PRN  0.9 % sodium chloride infusion, PRN  heparin flush 100 UNIT/ML injection 100 Units, 2 times per day  heparin flush 100 UNIT/ML injection 100 Units, PRN  [Held by provider] metoprolol tartrate (LOPRESSOR) tablet 12.5 mg, BID  Fidaxomicin (DIFICID) tablet 200 mg, BID  levoFLOXacin (LEVAQUIN) 750 MG/150ML infusion 750 mg, Q48H  apixaban (ELIQUIS) tablet 5 mg, BID  desvenlafaxine succinate (PRISTIQ) extended release tablet 50 mg, Daily  ondansetron (ZOFRAN-ODT) disintegrating tablet 4 mg, Q8H PRN  atorvastatin (LIPITOR) tablet 10 mg, Nightly  cloZAPine (CLOZARIL) tablet 50 mg, Nightly  levothyroxine (SYNTHROID) tablet 50 mcg, Daily  pantoprazole (PROTONIX) tablet 40 mg, QAM AC  cycloSPORINE (SANDIMMUNE) capsule 100 mg, BID  mycophenolate (CELLCEPT) capsule 500 mg, BID  predniSONE (DELTASONE) tablet 5 mg, Daily  allopurinol (ZYLOPRIM) tablet 484 mg, Daily  folic acid (FOLVITE) tablet 1 mg, Daily        Review of Systems:   Constitutional: weakness   Eyes: no eye pain , no itching , no drainage  Ears, nose, mouth, throat, and face: no ear ,nose pain , hearing is ok ,no nasal drainage   Respiratory: no sob ,no cough ,no wheezing . Cardiovascular: no chest pain , no palpitation ,no sob . Gastrointestinal: no nausea, vomiting , constipation , no abdominal pain . Genitourinary:no urinary retention , no burning , dysuria . No polyuria   Hematologic/lymphatic: no bleeding , no cougulation issues . Musculoskeletal:no joint pain , no swelling . Neurological: no headaches ,no weakness , no numbness . Endocrine: no thirst , no weight issues .      Physical exam:   Vital signs BP (!) 101/52   Pulse 97   Temp 98.7 °F (37.1 °C) (Axillary)   Resp 16   Ht 5' 2\" (1.575 m)   Wt 220 lb (99.8 kg)   SpO2 98%   BMI 40.24 kg/m²   Gen : In NAD , appears stated age . Head : NC/AT, EOMI, sclera and conjunctive are clear and anicteric. Mucous membranes dry  Neck : supple , no thyromegaly noted . Trachea midline  CV : regular rhythm, normal S1 and S2, No M/R/G . Lungs: CTAB , no wheezing , good air entry in all fields, though poor inspiratory effort  Abd : soft , NT , BS +   Skin : soft, dry . Neuro : CN  II-XII grossly intact , no focal neurologic deficit .    Psych : affect flat, minimally responsive, mostly to tactile stimulation, does not answer questions or follow commands    Data:   Labs:  CBC with Differential:    Lab Results   Component Value Date    WBC 4.7 03/14/2022    RBC 2.51 03/14/2022    HGB 8.1 03/14/2022    HCT 27.4 03/14/2022     03/14/2022    .2 03/14/2022    MCH 32.3 03/14/2022    MCHC 29.6 03/14/2022    RDW 15.7 03/14/2022    NRBC 1.0 01/13/2022    METASPCT 1.0 03/13/2022    LYMPHOPCT 13.1 03/14/2022    MONOPCT 9.9 03/14/2022    MYELOPCT 1.0 03/13/2022    BASOPCT 0.4 03/14/2022    MONOSABS 0.46 03/14/2022    LYMPHSABS 0.61 03/14/2022    EOSABS 0.16 03/14/2022    BASOSABS 0.02 03/14/2022     CMP:    Lab Results   Component Value Date     03/14/2022    K 3.4 03/14/2022    K 4.7 03/09/2022     03/14/2022    CO2 19 03/14/2022    BUN 40 03/14/2022    CREATININE 2.5 03/14/2022    GFRAA 23 03/14/2022    LABGLOM 19 03/14/2022    GLUCOSE 89 03/14/2022    PROT 4.0 03/14/2022    LABALBU 2.0 03/14/2022    CALCIUM 8.4 03/14/2022    BILITOT 0.3 03/14/2022    ALKPHOS 55 03/14/2022    AST 9 03/14/2022    ALT 9 03/14/2022     Ionized Calcium:  No results found for: IONCA  Magnesium:    Lab Results   Component Value Date    MG 1.9 03/01/2022     Phosphorus:    Lab Results   Component Value Date    PHOS 4.6 03/13/2022     U/A:    Lab Results   Component Value Date    COLORU Yellow 03/09/2022    PHUR 5.0 03/09/2022    WBCUA 1-3 03/09/2022    RBCUA NONE 03/09/2022 RBCUA 10-20 03/12/2014    YEAST Present 04/09/2021    BACTERIA MODERATE 03/09/2022    CLARITYU Clear 03/09/2022    SPECGRAV 1.025 03/09/2022    LEUKOCYTESUR Negative 03/09/2022    UROBILINOGEN 0.2 03/09/2022    BILIRUBINUR Negative 03/09/2022    BLOODU Negative 03/09/2022    GLUCOSEU Negative 03/09/2022     Microalbumen/Creatinine ratio:  No components found for: RUCREAT  Iron Saturation:  No components found for: PERCENTFE  TIBC:    Lab Results   Component Value Date    TIBC 135 01/19/2022     FERRITIN:    Lab Results   Component Value Date    FERRITIN 4,062 01/23/2022        Imaging:  XR CHEST PORTABLE   Final Result   Addendum 1 of 1   ADDENDUM:   The floor reports slow nasogastric tube output. Based on positioning on   plain film, recommend further advancement. Final   1. Possible right pleural effusion and right basilar atelectasis. 2. Cardiomegaly without pulmonary venous hypertension. 3. Possible hiatal hernia. XR ABDOMEN FOR NG/OG/NE TUBE PLACEMENT   Final Result   Enteric tube tip in the fundus of the stomach. US RETROPERITONEAL COMPLETE   Final Result   No hydronephrosis of the transplant kidney. The bladder is decompressed by a Chin catheter. IR FLUORO GUIDED CVA DEVICE PLMT/REPLACE/REMOVAL   Final Result   Successful placement of a dual lumen power PICC line. The tip of the PICC   line catheter was placed within the distal right subclavian vein. Allie Pathoff IMPRESSION:   Successful ultrasound and fluoroscopy guided PICC placement         IR ULTRASOUND GUIDANCE VASCULAR ACCESS   Final Result   Successful placement of a dual lumen power PICC line. The tip of the PICC   line catheter was placed within the distal right subclavian vein. Allie Pathoff IMPRESSION:   Successful ultrasound and fluoroscopy guided PICC placement         CT HEAD WO CONTRAST   Final Result   No acute intracranial abnormality or hemorrhage.       Bilateral mastoiditis and right sphenoid sinusitis. XR SHOULDER LEFT (MIN 2 VIEWS)   Final Result   No fracture. Increased separation of the humeral head from the osseous   glenoid and AC joint, which could represent subluxation. Otherwise, a large   synovial effusion, hematoma, or mass cannot be excluded. Clinical   correlation is recommended. CT scan of the left shoulder can be performed   for further evaluation. Cardiomegaly. XR HUMERUS LEFT (MIN 2 VIEWS)   Final Result   No fracture or dislocation. Increased separation of the humeral head from   the osseous glenoid and AC joint, which could represent subluxation. A large   synovial effusion, hematoma, or mass cannot be excluded. CT scan of the left   shoulder can be considered for further evaluation. XR CHEST PORTABLE   Final Result   1. Right perihilar discoid atelectasis. 2. There are no findings of failure or pneumonia. XR ABDOMEN FOR NG/OG/NE TUBE PLACEMENT    (Results Pending)       Assessment    -Acute kidney injury   -Hx of  donor kidney transplant :  -Immunosuppression host   -Anaemia of chronic disease   -severe deconditioning     Cr at plateued   Urine lytes support volume depletion .    Urine eosninophills negative   US transplant kidney no reported issues     Recommendation  Continue iv fluids in form of d5w-NS at 100 cc/hr   F/u serial bmp ,UO  Continue supportive care    Electronically signed by Kenney Barrios MD on 3/14/2022

## 2022-03-14 NOTE — PLAN OF CARE
Problem: Mental Status - Impaired:  Goal: Mental status will improve  Description: Mental status will improve  Outcome: Not Met This Shift     Problem: Falls - Risk of:  Goal: Will remain free from falls  Description: Will remain free from falls  Outcome: Met This Shift

## 2022-03-15 LAB
ALBUMIN SERPL-MCNC: 1.9 G/DL (ref 3.5–5.2)
ALP BLD-CCNC: 61 U/L (ref 35–104)
ALT SERPL-CCNC: 17 U/L (ref 0–32)
ANION GAP SERPL CALCULATED.3IONS-SCNC: 10 MMOL/L (ref 7–16)
AST SERPL-CCNC: 23 U/L (ref 0–31)
B.E.: -7.1 MMOL/L (ref -3–3)
BASOPHILIC STIPPLING: ABNORMAL
BASOPHILS ABSOLUTE: 0 E9/L (ref 0–0.2)
BASOPHILS RELATIVE PERCENT: 0 % (ref 0–2)
BILIRUB SERPL-MCNC: 0.4 MG/DL (ref 0–1.2)
BUN BLDV-MCNC: 36 MG/DL (ref 6–23)
CALCIUM SERPL-MCNC: 8.6 MG/DL (ref 8.6–10.2)
CHLORIDE BLD-SCNC: 116 MMOL/L (ref 98–107)
CO2: 17 MMOL/L (ref 22–29)
COHB: 0.3 % (ref 0–1.5)
CREAT SERPL-MCNC: 2.3 MG/DL (ref 0.5–1)
CRITICAL: ABNORMAL
DATE ANALYZED: ABNORMAL
DATE OF COLLECTION: ABNORMAL
EOSINOPHILS ABSOLUTE: 0.19 E9/L (ref 0.05–0.5)
EOSINOPHILS RELATIVE PERCENT: 4 % (ref 0–6)
GFR AFRICAN AMERICAN: 26
GFR NON-AFRICAN AMERICAN: 21 ML/MIN/1.73
GLUCOSE BLD-MCNC: 118 MG/DL (ref 74–99)
HCO3: 18.8 MMOL/L (ref 22–26)
HCT VFR BLD CALC: 27.4 % (ref 34–48)
HEMOGLOBIN: 7.9 G/DL (ref 11.5–15.5)
HHB: 3.3 % (ref 0–5)
HYPOCHROMIA: ABNORMAL
LAB: ABNORMAL
LYMPHOCYTES ABSOLUTE: 0.58 E9/L (ref 1.5–4)
LYMPHOCYTES RELATIVE PERCENT: 12 % (ref 20–42)
Lab: ABNORMAL
MAGNESIUM: 1.2 MG/DL (ref 1.6–2.6)
MCH RBC QN AUTO: 31.3 PG (ref 26–35)
MCHC RBC AUTO-ENTMCNC: 28.8 % (ref 32–34.5)
MCV RBC AUTO: 108.7 FL (ref 80–99.9)
METAMYELOCYTES RELATIVE PERCENT: 1 % (ref 0–1)
METHB: 0.4 % (ref 0–1.5)
MODE: ABNORMAL
MONOCYTES ABSOLUTE: 0.19 E9/L (ref 0.1–0.95)
MONOCYTES RELATIVE PERCENT: 4 % (ref 2–12)
NEUTROPHILS ABSOLUTE: 3.84 E9/L (ref 1.8–7.3)
NEUTROPHILS RELATIVE PERCENT: 79 % (ref 43–80)
O2 CONTENT: 10.8 ML/DL
O2 SATURATION: 96.7 % (ref 92–98.5)
O2HB: 96 % (ref 94–97)
OPERATOR ID: 30
OVALOCYTES: ABNORMAL
PATIENT TEMP: 37 C
PCO2: 39.2 MMHG (ref 35–45)
PDW BLD-RTO: 15.7 FL (ref 11.5–15)
PH BLOOD GAS: 7.3 (ref 7.35–7.45)
PHOSPHORUS: 3.4 MG/DL (ref 2.5–4.5)
PLATELET # BLD: 167 E9/L (ref 130–450)
PMV BLD AUTO: 13.2 FL (ref 7–12)
PO2: 96.4 MMHG (ref 75–100)
POIKILOCYTES: ABNORMAL
POLYCHROMASIA: ABNORMAL
POTASSIUM SERPL-SCNC: 3.9 MMOL/L (ref 3.5–5)
RBC # BLD: 2.52 E12/L (ref 3.5–5.5)
SODIUM BLD-SCNC: 143 MMOL/L (ref 132–146)
SOURCE, BLOOD GAS: ABNORMAL
THB: 7.9 G/DL (ref 11.5–16.5)
TIME ANALYZED: 1619
TOTAL PROTEIN: 3.9 G/DL (ref 6.4–8.3)
WBC # BLD: 4.8 E9/L (ref 4.5–11.5)

## 2022-03-15 PROCEDURE — 80053 COMPREHEN METABOLIC PANEL: CPT

## 2022-03-15 PROCEDURE — 85025 COMPLETE CBC W/AUTO DIFF WBC: CPT

## 2022-03-15 PROCEDURE — 82805 BLOOD GASES W/O2 SATURATION: CPT

## 2022-03-15 PROCEDURE — 2580000003 HC RX 258: Performed by: INTERNAL MEDICINE

## 2022-03-15 PROCEDURE — 6370000000 HC RX 637 (ALT 250 FOR IP): Performed by: INTERNAL MEDICINE

## 2022-03-15 PROCEDURE — 2580000003 HC RX 258: Performed by: SPECIALIST

## 2022-03-15 PROCEDURE — 1200000000 HC SEMI PRIVATE

## 2022-03-15 PROCEDURE — 97110 THERAPEUTIC EXERCISES: CPT

## 2022-03-15 PROCEDURE — 6370000000 HC RX 637 (ALT 250 FOR IP): Performed by: SPECIALIST

## 2022-03-15 PROCEDURE — 6360000002 HC RX W HCPCS: Performed by: INTERNAL MEDICINE

## 2022-03-15 PROCEDURE — P9047 ALBUMIN (HUMAN), 25%, 50ML: HCPCS | Performed by: INTERNAL MEDICINE

## 2022-03-15 PROCEDURE — 36600 WITHDRAWAL OF ARTERIAL BLOOD: CPT

## 2022-03-15 PROCEDURE — 2500000003 HC RX 250 WO HCPCS: Performed by: SPECIALIST

## 2022-03-15 PROCEDURE — 84100 ASSAY OF PHOSPHORUS: CPT

## 2022-03-15 PROCEDURE — 6360000002 HC RX W HCPCS: Performed by: SPECIALIST

## 2022-03-15 PROCEDURE — 2500000003 HC RX 250 WO HCPCS: Performed by: INTERNAL MEDICINE

## 2022-03-15 PROCEDURE — 2700000000 HC OXYGEN THERAPY PER DAY

## 2022-03-15 PROCEDURE — 36415 COLL VENOUS BLD VENIPUNCTURE: CPT

## 2022-03-15 PROCEDURE — 83735 ASSAY OF MAGNESIUM: CPT

## 2022-03-15 PROCEDURE — 94640 AIRWAY INHALATION TREATMENT: CPT

## 2022-03-15 RX ORDER — FUROSEMIDE 10 MG/ML
40 INJECTION INTRAMUSCULAR; INTRAVENOUS 2 TIMES DAILY
Status: COMPLETED | OUTPATIENT
Start: 2022-03-15 | End: 2022-03-15

## 2022-03-15 RX ORDER — ALBUMIN (HUMAN) 12.5 G/50ML
25 SOLUTION INTRAVENOUS 2 TIMES DAILY
Status: COMPLETED | OUTPATIENT
Start: 2022-03-15 | End: 2022-03-15

## 2022-03-15 RX ORDER — ALBUMIN (HUMAN) 12.5 G/50ML
25 SOLUTION INTRAVENOUS 2 TIMES DAILY
Status: DISCONTINUED | OUTPATIENT
Start: 2022-03-15 | End: 2022-03-15 | Stop reason: DRUGHIGH

## 2022-03-15 RX ORDER — MAGNESIUM OXIDE 400 MG/1
400 TABLET ORAL DAILY
Status: DISCONTINUED | OUTPATIENT
Start: 2022-03-15 | End: 2022-03-23 | Stop reason: HOSPADM

## 2022-03-15 RX ADMIN — ALLOPURINOL 100 MG: 100 TABLET ORAL at 09:06

## 2022-03-15 RX ADMIN — ALBUMIN (HUMAN) 25 G: 0.25 INJECTION, SOLUTION INTRAVENOUS at 12:30

## 2022-03-15 RX ADMIN — ATORVASTATIN CALCIUM 10 MG: 10 TABLET, FILM COATED ORAL at 21:44

## 2022-03-15 RX ADMIN — PREDNISONE 5 MG: 5 TABLET ORAL at 09:06

## 2022-03-15 RX ADMIN — LEVOTHYROXINE SODIUM 50 MCG: 0.05 TABLET ORAL at 06:30

## 2022-03-15 RX ADMIN — APIXABAN 5 MG: 5 TABLET, FILM COATED ORAL at 09:06

## 2022-03-15 RX ADMIN — IPRATROPIUM BROMIDE AND ALBUTEROL SULFATE 1 AMPULE: .5; 2.5 SOLUTION RESPIRATORY (INHALATION) at 02:00

## 2022-03-15 RX ADMIN — ACYCLOVIR SODIUM 350 MG: 50 INJECTION, SOLUTION INTRAVENOUS at 22:56

## 2022-03-15 RX ADMIN — IPRATROPIUM BROMIDE AND ALBUTEROL SULFATE 1 AMPULE: .5; 2.5 SOLUTION RESPIRATORY (INHALATION) at 06:08

## 2022-03-15 RX ADMIN — POTASSIUM CHLORIDE, DEXTROSE MONOHYDRATE AND SODIUM CHLORIDE: 150; 5; 900 INJECTION, SOLUTION INTRAVENOUS at 03:40

## 2022-03-15 RX ADMIN — LEVOFLOXACIN 750 MG: 5 INJECTION, SOLUTION INTRAVENOUS at 16:22

## 2022-03-15 RX ADMIN — FOLIC ACID 1 MG: 1 TABLET ORAL at 14:31

## 2022-03-15 RX ADMIN — DOCUSATE SODIUM 100 MG: 50 LIQUID ORAL at 21:45

## 2022-03-15 RX ADMIN — CLOZAPINE 50 MG: 25 TABLET ORAL at 21:44

## 2022-03-15 RX ADMIN — DOCUSATE SODIUM 100 MG: 50 LIQUID ORAL at 09:20

## 2022-03-15 RX ADMIN — METRONIDAZOLE 500 MG: 500 INJECTION, SOLUTION INTRAVENOUS at 06:29

## 2022-03-15 RX ADMIN — IPRATROPIUM BROMIDE AND ALBUTEROL SULFATE 1 AMPULE: .5; 2.5 SOLUTION RESPIRATORY (INHALATION) at 22:40

## 2022-03-15 RX ADMIN — HEPARIN 100 UNITS: 100 SYRINGE at 14:35

## 2022-03-15 RX ADMIN — IPRATROPIUM BROMIDE AND ALBUTEROL SULFATE 1 AMPULE: .5; 2.5 SOLUTION RESPIRATORY (INHALATION) at 09:58

## 2022-03-15 RX ADMIN — METRONIDAZOLE 500 MG: 500 INJECTION, SOLUTION INTRAVENOUS at 22:30

## 2022-03-15 RX ADMIN — APIXABAN 5 MG: 5 TABLET, FILM COATED ORAL at 21:44

## 2022-03-15 RX ADMIN — Medication 10 ML: at 21:45

## 2022-03-15 RX ADMIN — FIDAXOMICIN 200 MG: 200 TABLET, FILM COATED ORAL at 21:44

## 2022-03-15 RX ADMIN — MYCOPHENOLATE MOFETIL 500 MG: 250 CAPSULE ORAL at 09:19

## 2022-03-15 RX ADMIN — FUROSEMIDE 40 MG: 10 INJECTION, SOLUTION INTRAMUSCULAR; INTRAVENOUS at 19:21

## 2022-03-15 RX ADMIN — CYCLOSPORINE 100 MG: 25 CAPSULE, GELATIN COATED ORAL at 21:44

## 2022-03-15 RX ADMIN — IPRATROPIUM BROMIDE AND ALBUTEROL SULFATE 1 AMPULE: .5; 2.5 SOLUTION RESPIRATORY (INHALATION) at 13:58

## 2022-03-15 RX ADMIN — POTASSIUM CHLORIDE, DEXTROSE MONOHYDRATE AND SODIUM CHLORIDE: 150; 5; 900 INJECTION, SOLUTION INTRAVENOUS at 06:28

## 2022-03-15 RX ADMIN — ACYCLOVIR SODIUM 350 MG: 50 INJECTION, SOLUTION INTRAVENOUS at 10:24

## 2022-03-15 RX ADMIN — MAGNESIUM OXIDE 400 MG: 400 TABLET ORAL at 14:32

## 2022-03-15 RX ADMIN — PANTOPRAZOLE SODIUM 40 MG: 40 TABLET, DELAYED RELEASE ORAL at 06:30

## 2022-03-15 RX ADMIN — FIDAXOMICIN 200 MG: 200 TABLET, FILM COATED ORAL at 09:20

## 2022-03-15 RX ADMIN — IPRATROPIUM BROMIDE AND ALBUTEROL SULFATE 1 AMPULE: .5; 2.5 SOLUTION RESPIRATORY (INHALATION) at 19:09

## 2022-03-15 RX ADMIN — Medication 10 ML: at 09:00

## 2022-03-15 RX ADMIN — ALBUMIN (HUMAN) 25 G: 0.25 INJECTION, SOLUTION INTRAVENOUS at 21:44

## 2022-03-15 RX ADMIN — FUROSEMIDE 40 MG: 10 INJECTION, SOLUTION INTRAMUSCULAR; INTRAVENOUS at 13:39

## 2022-03-15 RX ADMIN — MAGNESIUM SULFATE HEPTAHYDRATE 3000 MG: 500 INJECTION, SOLUTION INTRAMUSCULAR; INTRAVENOUS at 11:40

## 2022-03-15 RX ADMIN — SODIUM BICARBONATE: 84 INJECTION, SOLUTION INTRAVENOUS at 14:32

## 2022-03-15 RX ADMIN — DESVENLAFAXINE 50 MG: 50 TABLET, EXTENDED RELEASE ORAL at 09:15

## 2022-03-15 RX ADMIN — MYCOPHENOLATE MOFETIL 500 MG: 250 CAPSULE ORAL at 21:44

## 2022-03-15 RX ADMIN — METRONIDAZOLE 500 MG: 500 INJECTION, SOLUTION INTRAVENOUS at 14:58

## 2022-03-15 RX ADMIN — CYCLOSPORINE 100 MG: 25 CAPSULE, GELATIN COATED ORAL at 09:12

## 2022-03-15 ASSESSMENT — PAIN SCALES - GENERAL: PAINLEVEL_OUTOF10: 0

## 2022-03-15 NOTE — PROGRESS NOTES
Pulmonary/Critical Care Progress Note    We are following patient for hypoxemia, dehydration, CECILY, urinary tract infection with Pseudomonas, COPD, status post cadaveric kidney transplant, C. difficile colitis, paroxysmal atrial fibrillation, possible aspiration pneumonitis    SUBJECTIVE:  Patient remains lethargic and slightly difficult to arouse. I believe we should check a blood gas since she does have some evidence of hypercapnia on chart on previous admissions. The patient is hypomagnesemic and this is being repleted. She is saturating adequately and is receiving bronchodilator therapy and appropriate antibiotics.     MEDICATIONS:   magnesium sulfate  3,000 mg IntraVENous Once    magnesium oxide  400 mg Oral Daily    furosemide  40 mg IntraVENous BID    albumin human  25 g IntraVENous BID    potassium chloride  20 mEq Oral Once    docusate  100 mg Per NG tube BID    ipratropium-albuterol  1 ampule Inhalation Q4H    acyclovir  5 mg/kg (Adjusted) IntraVENous Q12H    metroNIDAZOLE  500 mg IntraVENous Q8H    lidocaine  5 mL IntraDERmal Once    sodium chloride flush  5-40 mL IntraVENous 2 times per day    heparin flush  1 mL IntraVENous 2 times per day    [Held by provider] metoprolol tartrate  12.5 mg Oral BID    Fidaxomicin  200 mg Oral BID    levofloxacin  750 mg IntraVENous Q48H    apixaban  5 mg Oral BID    desvenlafaxine succinate  50 mg Oral Daily    atorvastatin  10 mg Oral Nightly    cloZAPine  50 mg Oral Nightly    levothyroxine  50 mcg Oral Daily    pantoprazole  40 mg Oral QAM AC    cycloSPORINE  100 mg Oral BID    mycophenolate  500 mg Oral BID    predniSONE  5 mg Oral Daily    allopurinol  100 mg Oral Daily    folic acid  1 mg Oral Daily      sodium bicarbonate infusion 50 mL/hr at 03/15/22 1432    sodium chloride       midodrine, sodium chloride flush, sodium chloride, heparin flush, ondansetron      REVIEW OF SYSTEMS:    Patient is not very responsive and cannot answer questions for review of systems      OBJECTIVE:  Vitals:    03/15/22 0815   BP: 134/83   Pulse: 104   Resp: 24   Temp: 99 °F (37.2 °C)   SpO2: 92%        O2 Flow Rate (L/min): 2 L/min  O2 Device: Nasal cannula    PHYSICAL EXAM:  Constitutional: No fever, chills, diaphoresis  Skin: No skin rash, no skin break  HEENT: Unremarkable  Neck: No JVD, lymphadenopathy, thyromegaly  Cardiovascular: S1, S2 regular. No S3 murmurs rubs present  Respiratory: Inspiratory crackles at bases, few anterior wheezes  Gastrointestinal: Soft, obese, nontender  Genitourinary: No CVA tenderness  Extremities: No clubbing, cyanosis, or edema  Neurological: Slightly difficult to arouse.   No obvious focal deficits  Psychological: Cannot assess    LABS:  WBC   Date Value Ref Range Status   03/15/2022 4.8 4.5 - 11.5 E9/L Final   03/14/2022 4.7 4.5 - 11.5 E9/L Final   03/13/2022 5.3 4.5 - 11.5 E9/L Final     Hemoglobin   Date Value Ref Range Status   03/15/2022 7.9 (L) 11.5 - 15.5 g/dL Final   03/14/2022 8.1 (L) 11.5 - 15.5 g/dL Final   03/13/2022 7.9 (L) 11.5 - 15.5 g/dL Final     Hematocrit   Date Value Ref Range Status   03/15/2022 27.4 (L) 34.0 - 48.0 % Final   03/14/2022 27.4 (L) 34.0 - 48.0 % Final   03/13/2022 26.9 (L) 34.0 - 48.0 % Final     MCV   Date Value Ref Range Status   03/15/2022 108.7 (H) 80.0 - 99.9 fL Final   03/14/2022 109.2 (H) 80.0 - 99.9 fL Final   03/13/2022 108.5 (H) 80.0 - 99.9 fL Final     Platelets   Date Value Ref Range Status   03/15/2022 167 130 - 450 E9/L Final   03/14/2022 163 130 - 450 E9/L Final   03/13/2022 154 130 - 450 E9/L Final     Sodium   Date Value Ref Range Status   03/15/2022 143 132 - 146 mmol/L Final   03/14/2022 140 132 - 146 mmol/L Final   03/13/2022 140 132 - 146 mmol/L Final     Potassium   Date Value Ref Range Status   03/15/2022 3.9 3.5 - 5.0 mmol/L Final   03/14/2022 3.4 (L) 3.5 - 5.0 mmol/L Final   03/13/2022 3.4 (L) 3.5 - 5.0 mmol/L Final     Potassium reflex Magnesium   Date Value Ref Range Status   03/09/2022 4.7 3.5 - 5.0 mmol/L Final   02/26/2022 4.7 3.5 - 5.0 mmol/L Final     Comment:     Specimen is moderately Hemolyzed. Result may be artificially increased.    02/25/2022 4.2 3.5 - 5.0 mmol/L Final     Chloride   Date Value Ref Range Status   03/15/2022 116 (H) 98 - 107 mmol/L Final   03/14/2022 110 (H) 98 - 107 mmol/L Final   03/13/2022 107 98 - 107 mmol/L Final     CO2   Date Value Ref Range Status   03/15/2022 17 (L) 22 - 29 mmol/L Final   03/14/2022 19 (L) 22 - 29 mmol/L Final   03/13/2022 21 (L) 22 - 29 mmol/L Final     BUN   Date Value Ref Range Status   03/15/2022 36 (H) 6 - 23 mg/dL Final   03/14/2022 40 (H) 6 - 23 mg/dL Final   03/13/2022 43 (H) 6 - 23 mg/dL Final     CREATININE   Date Value Ref Range Status   03/15/2022 2.3 (H) 0.5 - 1.0 mg/dL Final   03/14/2022 2.5 (H) 0.5 - 1.0 mg/dL Final   03/13/2022 2.6 (H) 0.5 - 1.0 mg/dL Final     Glucose   Date Value Ref Range Status   03/15/2022 118 (H) 74 - 99 mg/dL Final   03/14/2022 89 74 - 99 mg/dL Final   03/13/2022 110 (H) 74 - 99 mg/dL Final     Calcium   Date Value Ref Range Status   03/15/2022 8.6 8.6 - 10.2 mg/dL Final   03/14/2022 8.4 (L) 8.6 - 10.2 mg/dL Final   03/13/2022 9.0 8.6 - 10.2 mg/dL Final     Total Protein   Date Value Ref Range Status   03/15/2022 3.9 (L) 6.4 - 8.3 g/dL Final   03/14/2022 4.0 (L) 6.4 - 8.3 g/dL Final   03/13/2022 4.1 (L) 6.4 - 8.3 g/dL Final     Albumin   Date Value Ref Range Status   03/15/2022 1.9 (L) 3.5 - 5.2 g/dL Final   03/14/2022 2.0 (L) 3.5 - 5.2 g/dL Final   03/13/2022 1.9 (L) 3.5 - 5.2 g/dL Final     Total Bilirubin   Date Value Ref Range Status   03/15/2022 0.4 0.0 - 1.2 mg/dL Final   03/14/2022 0.3 0.0 - 1.2 mg/dL Final   03/13/2022 0.3 0.0 - 1.2 mg/dL Final     Alkaline Phosphatase   Date Value Ref Range Status   03/15/2022 61 35 - 104 U/L Final   03/14/2022 55 35 - 104 U/L Final   03/13/2022 65 35 - 104 U/L Final     AST   Date Value Ref Range Status   03/15/2022 23 0 - 31 U/L Final 03/14/2022 9 0 - 31 U/L Final   03/13/2022 13 0 - 31 U/L Final     ALT   Date Value Ref Range Status   03/15/2022 17 0 - 32 U/L Final   03/14/2022 9 0 - 32 U/L Final   03/13/2022 11 0 - 32 U/L Final     GFR Non-   Date Value Ref Range Status   03/15/2022 21 >=60 mL/min/1.73 Final     Comment:     Chronic Kidney Disease: less than 60 ml/min/1.73 sq.m. Kidney Failure: less than 15 ml/min/1.73 sq.m. Results valid for patients 18 years and older. 03/14/2022 19 >=60 mL/min/1.73 Final     Comment:     Chronic Kidney Disease: less than 60 ml/min/1.73 sq.m. Kidney Failure: less than 15 ml/min/1.73 sq.m. Results valid for patients 18 years and older. 03/13/2022 18 >=60 mL/min/1.73 Final     Comment:     Chronic Kidney Disease: less than 60 ml/min/1.73 sq.m. Kidney Failure: less than 15 ml/min/1.73 sq.m. Results valid for patients 18 years and older. GFR    Date Value Ref Range Status   03/15/2022 26  Final   03/14/2022 23  Final   03/13/2022 22  Final     Magnesium   Date Value Ref Range Status   03/15/2022 1.2 (L) 1.6 - 2.6 mg/dL Final   03/01/2022 1.9 1.6 - 2.6 mg/dL Final   02/28/2022 2.1 1.6 - 2.6 mg/dL Final     Phosphorus   Date Value Ref Range Status   03/15/2022 3.4 2.5 - 4.5 mg/dL Final   03/13/2022 4.6 (H) 2.5 - 4.5 mg/dL Final   03/01/2022 2.9 2.5 - 4.5 mg/dL Final     Recent Labs     03/13/22  0919   PH 7.262*   PO2 87.2   PCO2 44.0   HCO3 19.4*   BE -7.2*   O2SAT 95.9       RADIOLOGY:  XR ABDOMEN FOR NG/OG/NE TUBE PLACEMENT   Final Result   Satisfactory position of the enteric tube. XR CHEST PORTABLE   Final Result   Addendum 1 of 1   ADDENDUM:   The floor reports slow nasogastric tube output. Based on positioning on   plain film, recommend further advancement. Final   1. Possible right pleural effusion and right basilar atelectasis. 2. Cardiomegaly without pulmonary venous hypertension. 3. Possible hiatal hernia. XR ABDOMEN FOR NG/OG/NE TUBE PLACEMENT   Final Result   Enteric tube tip in the fundus of the stomach. US RETROPERITONEAL COMPLETE   Final Result   No hydronephrosis of the transplant kidney. The bladder is decompressed by a Chin catheter. IR FLUORO GUIDED CVA DEVICE PLMT/REPLACE/REMOVAL   Final Result   Successful placement of a dual lumen power PICC line. The tip of the PICC   line catheter was placed within the distal right subclavian vein. [place] IMPRESSION:   Successful ultrasound and fluoroscopy guided PICC placement         IR ULTRASOUND GUIDANCE VASCULAR ACCESS   Final Result   Successful placement of a dual lumen power PICC line. The tip of the PICC   line catheter was placed within the distal right subclavian vein. [place] IMPRESSION:   Successful ultrasound and fluoroscopy guided PICC placement         CT HEAD WO CONTRAST   Final Result   No acute intracranial abnormality or hemorrhage. Bilateral mastoiditis and right sphenoid sinusitis. XR SHOULDER LEFT (MIN 2 VIEWS)   Final Result   No fracture. Increased separation of the humeral head from the osseous   glenoid and AC joint, which could represent subluxation. Otherwise, a large   synovial effusion, hematoma, or mass cannot be excluded. Clinical   correlation is recommended. CT scan of the left shoulder can be performed   for further evaluation. Cardiomegaly. XR HUMERUS LEFT (MIN 2 VIEWS)   Final Result   No fracture or dislocation. Increased separation of the humeral head from   the osseous glenoid and AC joint, which could represent subluxation. A large   synovial effusion, hematoma, or mass cannot be excluded. CT scan of the left   shoulder can be considered for further evaluation. XR CHEST PORTABLE   Final Result   1. Right perihilar discoid atelectasis. 2. There are no findings of failure or pneumonia.          XR CHEST PORTABLE    (Results Pending)           PROBLEM LIST:  Principal Problem:    Acute respiratory failure with hypoxia (HCC)  Active Problems:    Kidney transplanted    Elevated troponin    Paroxysmal atrial fibrillation (HCC)    Hypotension    LBBB (left bundle branch block)    History of DVT (deep vein thrombosis)    Chronic anticoagulation    Acute respiratory failure with hypoxemia (HCC)  Resolved Problems:    * No resolved hospital problems. *      IMPRESSION:  1. Probable atelectasis, question aspiration pneumonitis  2. Hypokalemia  3. Hypomagnesemia  4. History of DVT  5. History of cadaveric kidney transplant  6. History of recalcitrant C. difficile infection    PLAN:  1. Continue aggressive bronchodilators  2. Will check ABGs to make sure that she is not hypercapnic and therefore difficult to arouse (has been hypercapnic in the past)  3.  Chest x-ray in a.m.  4. Levaquin and fidaxomicin per infectious disease        Electronically signed by Ashley Borden MD on 3/15/2022 at 3:38 PM

## 2022-03-15 NOTE — PROGRESS NOTES
Comprehensive Nutrition Assessment    Type and Reason for Visit:  Initial,RD Nutrition Re-Screen/LOS (Pt is new tube feeding as of 3/14/21)    Nutrition Recommendations/Plan: Modify Tube Feeding (Renal Tf(NEPRO) x23hrs(hold per pharmacy suggestion hold 1hr before and 1hr after administration) @45ml/hr x23hrs 1035mlTV/1863kcal/84gm pro/750ml FW: additional water flushes per renal recommendations. Nutrition Assessment:  Pt admits w/lethargy w/AMS Hx CKD s/p kidney transplant (2015), HTN,CHF,COPD. Pt currently NPO 2/2 to on-going lethargy, at further nutritional compromise d/t presence of pressure injury. Pt recieves TF via NG tube for nutritional support. Will update recommendations to meet optimal nutritional needs. May need to consider PEG tube if cognitive status does not improve    Malnutrition Assessment:  Malnutrition Status: At risk for malnutrition (Comment)    Context:  Acute Illness     Findings of the 6 clinical characteristics of malnutrition:  Energy Intake:  7 - 50% or less of estimated energy requirements for 5 or more days  Weight Loss:  No significant weight loss     Body Fat Loss:  No significant body fat loss     Muscle Mass Loss:  No significant muscle mass loss    Fluid Accumulation:  No significant fluid accumulation     Strength:  Not Performed    Estimated Daily Nutrient Needs:  Energy (kcal):  ; Weight Used for Energy Requirements:  Current     Protein (g):  75-85 (1.5-1.7gm/kg(monitor renal status)); Weight Used for Protein Requirements:  Ideal        Fluid (ml/day):  ; Method Used for Fluid Requirements:  1 ml/kcal      Nutrition Related Findings:  Alert to person only, abd WDL, +BS, BLE +1 pitting edema, BUE non-pitting edema, renal labs elevated, Mg+1. 2(L), Phosphorus WNL, +I/O +3.5L      Wounds:  Pressure Injury (per LDA no wound consult ordered)       Current Nutrition Therapies:    ADULT TUBE FEEDING; Nasogastric; Renal Formula; Continuous; 40; No; 30; Q 4 hours    Anthropometric Measures:  · Height: 5' 2\" (157.5 cm)  · Current Body Weight: 226 lb (102.5 kg) (3/15 bed scale per RD)   · Admission Body Weight: 220 lb (99.8 kg) (3/9 stated wt)    · Usual Body Weight: 216 lb (98 kg) (1/10/22 bed scale wt per emr ,11/17/21 227# bed scale)     · Ideal Body Weight: 110 lbs; % Ideal Body Weight 205.5 %   · BMI: 41.3  · Adjusted Body Weight:  ; No Adjustment   · BMI Categories: Obese Class 3 (BMI 40.0 or greater)       Nutrition Diagnosis:   · Inadequate oral intake related to cognitive or neurological impairment (increased lethargy, SLP rec.) as evidenced by NPO or clear liquid status due to medical condition,nutrition support - enteral nutrition,wounds,poor intake prior to admission,swallow study results,lab values      Nutrition Interventions:   Food and/or Nutrient Delivery:  Modify Tube Feeding (Renal Tf(NEPRO) x23hrs(hold per pharmacy suggestion hold 1hr before and 1 after administration) @45ml/hr x23hrs 1035mlTV/1863kcal/84gm pro/750ml FW: additional water flushes per renal recommendations)  Nutrition Education/Counseling:  Education not appropriate   Coordination of Nutrition Care:  Continue to monitor while inpatient    Goals:  EN tolerance       Nutrition Monitoring and Evaluation:       Food/Nutrient Intake Outcomes:  Enteral Nutrition Intake/Tolerance  Physical Signs/Symptoms Outcomes:  Biochemical Data,Chewing or Swallowing,Fluid Status or Edema,Hemodynamic Status,Nutrition Focused Physical Findings,Skin     Discharge Planning:     Too soon to determine     Electronically signed by Corina Amor RD, LD on 3/15/22 at 9:25 AM EDT    Contact: [phone]

## 2022-03-15 NOTE — PROGRESS NOTES
Progress Note  Date:3/14/2022       Room:[facility]0415-01  Patient Igna Coyle     YOB: 1955     Age:66 y.o. Subjective    Subjective:  Symptoms:  Stable. (Lethargic , sleeping , answers faintly on calling  her . ). Diet:  Poor intake. Activity level: Impaired due to weakness. Review of Systems  Objective         Vitals Last 24 Hours:  TEMPERATURE:  Temp  Av.5 °F (36.9 °C)  Min: 98 °F (36.7 °C)  Max: 98.7 °F (37.1 °C)  RESPIRATIONS RANGE: Resp  Av  Min: 16  Max: 16  PULSE OXIMETRY RANGE: SpO2  Av.3 %  Min: 94 %  Max: 98 %  PULSE RANGE: Pulse  Av.3  Min: 97  Max: 98  BLOOD PRESSURE RANGE: Systolic (56NQT), BGE:754 , Min:101 , QHK:877   ; Diastolic (62QNL), ROSA ELENA:35, Min:52, Max:59    I/O (24Hr): Intake/Output Summary (Last 24 hours) at 3/14/2022 2153  Last data filed at 3/14/2022 1800  Gross per 24 hour   Intake 11 ml   Output 850 ml   Net -839 ml     Objective:  General Appearance: In no acute distress (lethargic , non arousable ). Vital signs: (most recent): Blood pressure (!) 130/54, pulse 98, temperature 98 °F (36.7 °C), temperature source Oral, resp. rate 16, height 5' 2\" (1.575 m), weight 220 lb (99.8 kg), SpO2 94 %. (Bp optimal ). Output: Producing urine. HEENT: Normal HEENT exam.    Lungs:  Normal effort and normal respiratory rate. There are decreased breath sounds. Heart: Normal rate. Regular rhythm. Positive for murmur. Abdomen: Abdomen is soft and distended. Bowel sounds are normal.   There is no abdominal tenderness. Extremities: (Edema +1-2   Both LL )  Pulses: Distal pulses are intact. Neurological: (Lethargic and sleepy ). Pupils:  Pupils are equal, round, and reactive to light.       Labs/Imaging/Diagnostics    Labs:  CBC:  Recent Labs     22  0400 22  0540 22  0510   WBC 8.0 5.3 4.7   RBC 2.35* 2.48* 2.51*   HGB 7.4* 7.9* 8.1*   HCT 26.0* 26.9* 27.4*   .6* 108.5* 109.2*   RDW 15.9* 15.5* 15.7*    154 163     CHEMISTRIES:  Recent Labs     03/13/22  0540 03/13/22  1708 03/14/22  0510    140 140   K 3.4* 3.4* 3.4*   * 107 110*   CO2 21* 21* 19*   BUN 44* 43* 40*   CREATININE 2.6* 2.6* 2.5*   GLUCOSE 91 110* 89   PHOS 4.6*  --   --      PT/INR:No results for input(s): PROTIME, INR in the last 72 hours. APTT:No results for input(s): APTT in the last 72 hours. LIVER PROFILE:  Recent Labs     03/12/22  0400 03/13/22  0540 03/14/22  0510   AST 12 13 9   ALT 9 11 9   BILITOT 0.4 0.3 0.3   ALKPHOS 59 65 55       Imaging Last 24 Hours:  XR HUMERUS LEFT (MIN 2 VIEWS)    Result Date: 3/10/2022  EXAMINATION: TWO XRAY VIEWS OF THE LEFT HUMERUS 3/10/2022 9:31 am COMPARISON: None. HISTORY: ORDERING SYSTEM PROVIDED HISTORY: limited mobility TECHNOLOGIST PROVIDED HISTORY: Reason for exam:->limited mobility FINDINGS: There is no evidence of fracture or dislocation. No significant osteo-degenerative changes or other osseous abnormalities are identified. There is increased separation of the humeral head from the osseous glenoid and AC joint. This could represent subluxation. The presence of a large synovial effusion, hematoma, or mass cannot be excluded. CT scan of the left shoulder can be considered for further evaluation. Surgical clips are identified in the soft tissues about the mid to distal humerus. No fracture or dislocation. Increased separation of the humeral head from the osseous glenoid and AC joint, which could represent subluxation. A large synovial effusion, hematoma, or mass cannot be excluded. CT scan of the left shoulder can be considered for further evaluation.      CT HEAD WO CONTRAST    Result Date: 3/10/2022  EXAMINATION: CT OF THE HEAD WITHOUT CONTRAST  3/10/2022 3:29 pm TECHNIQUE: CT of the head was performed without the administration of intravenous contrast. Dose modulation, iterative reconstruction, and/or weight based adjustment of the mA/kV was utilized to reduce the radiation dose to as low as reasonably achievable. COMPARISON: February 25, 2022 HISTORY: ORDERING SYSTEM PROVIDED HISTORY: AMS TECHNOLOGIST PROVIDED HISTORY: Reason for exam:->AMS Has a \"code stroke\" or \"stroke alert\" been called? ->No FINDINGS: BRAIN/VENTRICLES: There are age related cortical atrophy and periventricular white matter ischemic changes. There is no acute intracranial hemorrhage, mass effect or midline shift. No abnormal extra-axial fluid collection. The gray-white differentiation is maintained without evidence of an acute infarct. There is no evidence of hydrocephalus. ORBITS: The visualized portion of the orbits demonstrate no acute abnormality. SINUSES: There is partial opacification of bilateral mastoid air cells. Mucosal thickening in the right sphenoid sinus. SOFT TISSUES/SKULL:  No acute abnormality of the visualized skull or soft tissues. No acute intracranial abnormality or hemorrhage. Bilateral mastoiditis and right sphenoid sinusitis. IR FLUORO GUIDED CVA DEVICE PLMT/REPLACE/REMOVAL    Result Date: 3/11/2022  PROCEDURE: ULTRASOUND GUIDED VASCULAR ACCESS. FLUOROSCOPY GUIDED PICC PLACEMENT 3/11/2022. HISTORY: ORDERING SYSTEM PROVIDED HISTORY: picc line/ medline TECHNOLOGIST PROVIDED HISTORY: Reason for exam:->picc line/ medline SEDATION: None FLUOROSCOPY DOSE AND TYPE OR TIME AND EXPOSURES: Fluoroscopy time equals 8.2 minutes. Total dose equals 116 mGy TECHNIQUE: The procedure was performed under ultrasound and fluoroscopic guidance. And ultrasound image and fluoroscopic image was obtained for medical records. Megan Swenson FINDINGS: The patient's right arm was prepped and draped in a sterile fashion using a maximum sterile barrier technique. Following the uneventful administration of lidocaine I introduced a micro puncture needle into the right basilic vein using ultrasound guidance. Through the micropuncture needle a microwire was introduced into the vein.  Over the microwire a peel-away sheath was placed within the vein. A catheter measurement was obtained. The catheter was then trimmed to 25 cm and introduced over the wire into the right basilic vein. The catheter tip was placed within the right subclavian vein. .  The peel-away sheath was then removed and the PICC line was secured to the skin. A sterile dressing was applied. The patient tolerated the procedure well and no complications. A fluoroscopic image was obtained which confirms position of the PICC line catheter. Successful placement of a dual lumen power PICC line. The tip of the PICC line catheter was placed within the distal right subclavian vein. Huey Mcallister IMPRESSION: Successful ultrasound and fluoroscopy guided PICC placement     XR SHOULDER LEFT (MIN 2 VIEWS)    Result Date: 3/10/2022  EXAMINATION: THREE XRAY VIEWS OF THE LEFT SHOULDER 3/10/2022 9:31 am COMPARISON: None HISTORY: ORDERING SYSTEM PROVIDED HISTORY: limited mobility and bruising TECHNOLOGIST PROVIDED HISTORY: Reason for exam:->limited mobility and bruising FINDINGS: There is no evidence of fracture. There is increased separation of the humeral head from the osseous glenoid and the acromioclavicular joint, which could represent subluxation. Otherwise, the presence of a large synovial effusion, hematoma, or mass cannot be excluded. Clinical correlation is recommended. CT scan of the left shoulder can be performed for further evaluation. The heart is enlarged. Surgical clips are identified in the soft tissues surrounding the midportion of the humerus. No fracture. Increased separation of the humeral head from the osseous glenoid and AC joint, which could represent subluxation. Otherwise, a large synovial effusion, hematoma, or mass cannot be excluded. Clinical correlation is recommended. CT scan of the left shoulder can be performed for further evaluation. Cardiomegaly.      IR ULTRASOUND GUIDANCE VASCULAR ACCESS    Result Date: 3/11/2022  PROCEDURE: ULTRASOUND GUIDED VASCULAR ACCESS. FLUOROSCOPY GUIDED PICC PLACEMENT 3/11/2022. HISTORY: ORDERING SYSTEM PROVIDED HISTORY: picc line/ medline TECHNOLOGIST PROVIDED HISTORY: Reason for exam:->picc line/ medline SEDATION: None FLUOROSCOPY DOSE AND TYPE OR TIME AND EXPOSURES: Fluoroscopy time equals 8.2 minutes. Total dose equals 116 mGy TECHNIQUE: The procedure was performed under ultrasound and fluoroscopic guidance. And ultrasound image and fluoroscopic image was obtained for medical records. Jf Rosa FINDINGS: The patient's right arm was prepped and draped in a sterile fashion using a maximum sterile barrier technique. Following the uneventful administration of lidocaine I introduced a micro puncture needle into the right basilic vein using ultrasound guidance. Through the micropuncture needle a microwire was introduced into the vein. Over the microwire a peel-away sheath was placed within the vein. A catheter measurement was obtained. The catheter was then trimmed to 25 cm and introduced over the wire into the right basilic vein. The catheter tip was placed within the right subclavian vein. .  The peel-away sheath was then removed and the PICC line was secured to the skin. A sterile dressing was applied. The patient tolerated the procedure well and no complications. A fluoroscopic image was obtained which confirms position of the PICC line catheter. Successful placement of a dual lumen power PICC line. The tip of the PICC line catheter was placed within the distal right subclavian vein. Jf Rosa  IMPRESSION: Successful ultrasound and fluoroscopy guided PICC placement     Assessment//Plan           Hospital Problems           Last Modified POA    * (Principal) Acute respiratory failure with hypoxia ([address] 3/9/2022 Yes    Kidney transplanted 3/10/2022 Yes    Elevated troponin 3/10/2022 Yes    Paroxysmal atrial fibrillation ([address] 3/10/2022 Yes    Hypotension 3/10/2022 Yes    LBBB (left bundle branch block) 3/10/2022 Yes    History of DVT (deep vein thrombosis) 3/10/2022 Yes    Chronic anticoagulation 3/10/2022 Yes    Acute respiratory failure with hypoxemia ([address] 3/11/2022 Yes        Assessment:    Condition: In stable condition. Unchanged. (Sepsis ? Cause aspiration pneumonia  Was on zosyn and vanc , now on  levaquin ,   Acute respiratory failure secondary to above on o2 ,   Metabolic encephalopathy in immuno compromised host , on acyclovir for possible  herpes simplex  Encephalitis   METABOIC ACIDOSIs , due to acute on chronic renal failure   cdiff colitis on  Flagyl   Left femoral and popliteal DVT on eliquis . S/p IVC filter placement   Acute on chronic renal failure , on  iv fluids and worsening ,   hyperlpid , continue lipitor   Bipolar disorder on pristiq  And clozaril  Chronic renal  failure s/p renal transplant 2015 . On cellcept and cyclosporine   Hypothyroidism on levothyroxine . Hypotension hold metorpolol and lisinopril , on midodrin as needed   Delirium / metabolic encephalopathy secondary to above , unabel to have oral intake , NG tube placement and start TF  Called and discussed with  Papito Balderrama on the phone . ).        Electronically signed by Hermes Clark MD on 3/11/22 at 8:21 PM EST

## 2022-03-15 NOTE — PROGRESS NOTES
Speech Language Pathology      NAME:  Niru Vail  :  1955  DATE: 3/15/2022  ROOM:  [facility]3419-    Attempted ongoing Speech-Language Pathology intervention for dysphagia. Pt unavailable at this time due to:  [] HOLD per RN/ medical staff d/t medical status   [] Off unit for testing/ procedure    [] With medical staff   [] Declined intervention  [x] Sleeping/ Lethargic still not alert enough for safe PO attempts   [] Other:     SLP to continue previously established POC and re-attempt as able.             Confusion [R41.0]  Acute respiratory failure with hypoxia ([address] [J96.01]  Acute respiratory failure with hypoxemia ([address] [J96.01]        David Kaminski MSCCC/SLP  Speech Language Pathologist  [id number]

## 2022-03-15 NOTE — PLAN OF CARE
Problem: Falls - Risk of:  Goal: Will remain free from falls  Description: Will remain free from falls  3/15/2022 1632 by Ricardo Curtis RN  Outcome: Met This Shift     Problem: Falls - Risk of:  Goal: Absence of physical injury  Description: Absence of physical injury  3/15/2022 1632 by Ricardo Curtis RN  Outcome: Met This Shift     Problem: Skin Integrity:  Goal: Absence of new skin breakdown  Description: Absence of new skin breakdown  3/15/2022 1632 by Ricardo Curtis RN  Outcome: Met This Shift

## 2022-03-15 NOTE — PROGRESS NOTES
Progress Note  Date:3/15/2022       Room:[id number]-  Patient Deepti Garcia     YOB: 1955     Age:66 y.o. Subjective    Subjective:  Symptoms:  Stable. (Lethargic , sleeping , answers and opens her eyes on calling her name   ). Diet:  Poor intake. Activity level: Impaired due to weakness. Review of Systems  Objective         Vitals Last 24 Hours:  TEMPERATURE:  Temp  Av.2 °F (36.8 °C)  Min: 97.7 °F (36.5 °C)  Max: 99 °F (37.2 °C)  RESPIRATIONS RANGE: Resp  Av  Min: 16  Max: 24  PULSE OXIMETRY RANGE: SpO2  Av.3 %  Min: 92 %  Max: 97 %  PULSE RANGE: Pulse  Av.3  Min: 93  Max: 104  BLOOD PRESSURE RANGE: Systolic (44AWU), VICTOR HUGO:515 , Min:119 , EHI:177   ; Diastolic (98AMK), OGU:82, Min:54, Max:83    I/O (24Hr): Intake/Output Summary (Last 24 hours) at 3/15/2022 1732  Last data filed at 3/15/2022 1452  Gross per 24 hour   Intake 640 ml   Output 750 ml   Net -110 ml     Objective:  General Appearance: In no acute distress (lethargic , faintly arousable ). Vital signs: (most recent): Blood pressure 119/72, pulse 93, temperature 97.7 °F (36.5 °C), temperature source Infrared, resp. rate 24, height 5' 2\" (1.575 m), weight 226 lb (102.5 kg), SpO2 97 %. (Bp optimal ). Output: Producing urine. HEENT: Normal HEENT exam.    Lungs:  Normal effort and normal respiratory rate. There are decreased breath sounds. Heart: Normal rate. Regular rhythm. Positive for murmur. Abdomen: Abdomen is soft and distended. Bowel sounds are normal.   There is no abdominal tenderness. Extremities: (Edema +1-2   Both LL )  Pulses: Distal pulses are intact. Neurological: (Lethargic and sleepy , arousable better than yesterday , still very lethargic ). Pupils:  Pupils are equal, round, and reactive to light.       Labs/Imaging/Diagnostics    Labs:  CBC:  Recent Labs     22  0540 22  0510 03/15/22  0520   WBC 5.3 4.7 4.8   RBC 2.48* 2.51* 2.52* HGB 7.9* 8.1* 7.9*   HCT 26.9* 27.4* 27.4*   .5* 109.2* 108.7*   RDW 15.5* 15.7* 15.7*    163 167     CHEMISTRIES:  Recent Labs     03/13/22  0540 03/13/22  0540 03/13/22  1708 03/14/22  0510 03/15/22  0520      < > 140 140 143   K 3.4*   < > 3.4* 3.4* 3.9   *   < > 107 110* 116*   CO2 21*   < > 21* 19* 17*   BUN 44*   < > 43* 40* 36*   CREATININE 2.6*   < > 2.6* 2.5* 2.3*   GLUCOSE 91   < > 110* 89 118*   PHOS 4.6*  --   --   --  3.4   MG  --   --   --   --  1.2*    < > = values in this interval not displayed. PT/INR:No results for input(s): PROTIME, INR in the last 72 hours. APTT:No results for input(s): APTT in the last 72 hours. LIVER PROFILE:  Recent Labs     03/13/22  0540 03/14/22  0510 03/15/22  0520   AST 13 9 23   ALT 11 9 17   BILITOT 0.3 0.3 0.4   ALKPHOS 65 55 61       Imaging Last 24 Hours:  XR HUMERUS LEFT (MIN 2 VIEWS)    Result Date: 3/10/2022  EXAMINATION: TWO XRAY VIEWS OF THE LEFT HUMERUS 3/10/2022 9:31 am COMPARISON: None. HISTORY: ORDERING SYSTEM PROVIDED HISTORY: limited mobility TECHNOLOGIST PROVIDED HISTORY: Reason for exam:->limited mobility FINDINGS: There is no evidence of fracture or dislocation. No significant osteo-degenerative changes or other osseous abnormalities are identified. There is increased separation of the humeral head from the osseous glenoid and AC joint. This could represent subluxation. The presence of a large synovial effusion, hematoma, or mass cannot be excluded. CT scan of the left shoulder can be considered for further evaluation. Surgical clips are identified in the soft tissues about the mid to distal humerus. No fracture or dislocation. Increased separation of the humeral head from the osseous glenoid and AC joint, which could represent subluxation. A large synovial effusion, hematoma, or mass cannot be excluded. CT scan of the left shoulder can be considered for further evaluation.      CT HEAD WO CONTRAST    Result Date: 3/10/2022  EXAMINATION: CT OF THE HEAD WITHOUT CONTRAST  3/10/2022 3:29 pm TECHNIQUE: CT of the head was performed without the administration of intravenous contrast. Dose modulation, iterative reconstruction, and/or weight based adjustment of the mA/kV was utilized to reduce the radiation dose to as low as reasonably achievable. COMPARISON: February 25, 2022 HISTORY: ORDERING SYSTEM PROVIDED HISTORY: AMS TECHNOLOGIST PROVIDED HISTORY: Reason for exam:->AMS Has a \"code stroke\" or \"stroke alert\" been called? ->No FINDINGS: BRAIN/VENTRICLES: There are age related cortical atrophy and periventricular white matter ischemic changes. There is no acute intracranial hemorrhage, mass effect or midline shift. No abnormal extra-axial fluid collection. The gray-white differentiation is maintained without evidence of an acute infarct. There is no evidence of hydrocephalus. ORBITS: The visualized portion of the orbits demonstrate no acute abnormality. SINUSES: There is partial opacification of bilateral mastoid air cells. Mucosal thickening in the right sphenoid sinus. SOFT TISSUES/SKULL:  No acute abnormality of the visualized skull or soft tissues. No acute intracranial abnormality or hemorrhage. Bilateral mastoiditis and right sphenoid sinusitis. IR FLUORO GUIDED CVA DEVICE PLMT/REPLACE/REMOVAL    Result Date: 3/11/2022  PROCEDURE: ULTRASOUND GUIDED VASCULAR ACCESS. FLUOROSCOPY GUIDED PICC PLACEMENT 3/11/2022. HISTORY: ORDERING SYSTEM PROVIDED HISTORY: picc line/ medline TECHNOLOGIST PROVIDED HISTORY: Reason for exam:->picc line/ medline SEDATION: None FLUOROSCOPY DOSE AND TYPE OR TIME AND EXPOSURES: Fluoroscopy time equals 8.2 minutes. Total dose equals 116 mGy TECHNIQUE: The procedure was performed under ultrasound and fluoroscopic guidance. And ultrasound image and fluoroscopic image was obtained for medical records. [facility]  FINDINGS: The patient's right arm was prepped and draped in a sterile fashion using a maximum sterile barrier technique. Following the uneventful administration of lidocaine I introduced a micro puncture needle into the right basilic vein using ultrasound guidance. Through the micropuncture needle a microwire was introduced into the vein. Over the microwire a peel-away sheath was placed within the vein. A catheter measurement was obtained. The catheter was then trimmed to 25 cm and introduced over the wire into the right basilic vein. The catheter tip was placed within the right subclavian vein. .  The peel-away sheath was then removed and the PICC line was secured to the skin. A sterile dressing was applied. The patient tolerated the procedure well and no complications. A fluoroscopic image was obtained which confirms position of the PICC line catheter. Successful placement of a dual lumen power PICC line. The tip of the PICC line catheter was placed within the distal right subclavian vein. Rachell Salazar IMPRESSION: Successful ultrasound and fluoroscopy guided PICC placement     XR SHOULDER LEFT (MIN 2 VIEWS)    Result Date: 3/10/2022  EXAMINATION: THREE XRAY VIEWS OF THE LEFT SHOULDER 3/10/2022 9:31 am COMPARISON: None HISTORY: ORDERING SYSTEM PROVIDED HISTORY: limited mobility and bruising TECHNOLOGIST PROVIDED HISTORY: Reason for exam:->limited mobility and bruising FINDINGS: There is no evidence of fracture. There is increased separation of the humeral head from the osseous glenoid and the acromioclavicular joint, which could represent subluxation. Otherwise, the presence of a large synovial effusion, hematoma, or mass cannot be excluded. Clinical correlation is recommended. CT scan of the left shoulder can be performed for further evaluation. The heart is enlarged. Surgical clips are identified in the soft tissues surrounding the midportion of the humerus. No fracture. Increased separation of the humeral head from the osseous glenoid and AC joint, which could represent subluxation. Otherwise, a large synovial effusion, hematoma, or mass cannot be excluded. Clinical correlation is recommended. CT scan of the left shoulder can be performed for further evaluation. Cardiomegaly. IR ULTRASOUND GUIDANCE VASCULAR ACCESS    Result Date: 3/11/2022  PROCEDURE: ULTRASOUND GUIDED VASCULAR ACCESS. FLUOROSCOPY GUIDED PICC PLACEMENT 3/11/2022. HISTORY: ORDERING SYSTEM PROVIDED HISTORY: picc line/ medline TECHNOLOGIST PROVIDED HISTORY: Reason for exam:->picc line/ medline SEDATION: None FLUOROSCOPY DOSE AND TYPE OR TIME AND EXPOSURES: Fluoroscopy time equals 8.2 minutes. Total dose equals 116 mGy TECHNIQUE: The procedure was performed under ultrasound and fluoroscopic guidance. And ultrasound image and fluoroscopic image was obtained for medical records. [place] FINDINGS: The patient's right arm was prepped and draped in a sterile fashion using a maximum sterile barrier technique. Following the uneventful administration of lidocaine I introduced a micro puncture needle into the right basilic vein using ultrasound guidance. Through the micropuncture needle a microwire was introduced into the vein. Over the microwire a peel-away sheath was placed within the vein. A catheter measurement was obtained. The catheter was then trimmed to 25 cm and introduced over the wire into the right basilic vein. The catheter tip was placed within the right subclavian vein. .  The peel-away sheath was then removed and the PICC line was secured to the skin. A sterile dressing was applied. The patient tolerated the procedure well and no complications. A fluoroscopic image was obtained which confirms position of the PICC line catheter. Successful placement of a dual lumen power PICC line. The tip of the PICC line catheter was placed within the distal right subclavian vein. [place]  IMPRESSION: Successful ultrasound and fluoroscopy guided PICC placement     Assessment//Plan           Hospital Problems           Last Modified POA * (Principal) Acute respiratory failure with hypoxia ([facility] 3/9/2022 Yes    Kidney transplanted 3/10/2022 Yes    Elevated troponin 3/10/2022 Yes    Paroxysmal atrial fibrillation ([facility] 3/10/2022 Yes    Hypotension 3/10/2022 Yes    LBBB (left bundle branch block) 3/10/2022 Yes    History of DVT (deep vein thrombosis) 3/10/2022 Yes    Chronic anticoagulation 3/10/2022 Yes    Acute respiratory failure with hypoxemia ([facility] 3/11/2022 Yes        Assessment:    Condition: In stable condition. Improving. (Sepsis ? Cause aspiration pneumonia  Was on zosyn and vanc , now on  levaquin ,   Acute respiratory failure secondary to above on o2 ,   Metabolic encephalopathy in immuno compromised host , on acyclovir for possible  herpes simplex  Encephalitis   METABOIC ACIDOSIs , due to acute on chronic renal failure   cdiff colitis on  Flagyl   Left femoral and popliteal DVT on eliquis . S/p IVC filter placement   Acute on chronic renal failure , on  iv fluids  Creatinine better 2.3   hyperlpid , continue lipitor   Bipolar disorder on pristiq  And clozaril  Chronic renal  failure s/p renal transplant 2015 . On cellcept and cyclosporine   Hypothyroidism on levothyroxine . Hypotension hold metorpolol and lisinopril , on midodrin as needed   Delirium / metabolic encephalopathy secondary to above , unable to have oral intake , NG tube placement and advance TF, nepro 40 cc/ hour left   Metabolic acidosis due to renal failure  start hco3 drip. Malnutrition and edema , spa and lasix per dr Mina Bloom and discussed with  Olivier Pedersen on the phone . Slight improvement in the clinical status      ).        Electronically signed by William Dumont MD on 3/11/22 at 8:21 PM EST

## 2022-03-15 NOTE — PLAN OF CARE
Problem: Falls - Risk of:  Goal: Will remain free from falls  3/15/2022 1004 by Dian Valle RN  Outcome: Met This Shift  Note: No falls     Problem: Falls - Risk of:  Goal: Absence of physical injury  3/15/2022 1004 by Dian Valle RN  Outcome: Met This Shift     Problem: Skin Integrity:  Goal: Will show no infection signs and symptoms  3/15/2022 1004 by Dian Valle RN  Outcome: Met This Shift  Note: No skin breakdown     Problem: Skin Integrity:  Goal: Absence of new skin breakdown  3/15/2022 1004 by Dian Valle RN  Outcome: Met This Shift     Problem: Skin Integrity:  Goal: Absence of new skin breakdown  3/15/2022 1004 by Dian Valle RN  Outcome: Met This Shift     Problem: Coping:  Goal: Ability to remain calm will improve  3/15/2022 1004 by Dian Valle RN  Outcome: Met This Shift     Problem: Safety:  Goal: Ability to remain free from injury will improve  3/15/2022 1004 by Dian Valle RN  Outcome: Met This Shift     Problem: Safety:  Goal: Ability to remain free from injury will improve  3/15/2022 1004 by Dian Valle RN  Outcome: Met This Shift     Problem: Safety:  Goal: Ability to remain free from injury will improve  3/15/2022 1004 by Dian Valle RN  Outcome: Met This Shift     Problem: Self-Care:  Goal: Ability to participate in self-care as condition permits will improve  3/15/2022 1004 by Dian Valle RN  Outcome: Met This Shift     Problem: Mental Status - Impaired:  Goal: Mental status will improve  3/15/2022 1004 by Dian Valle RN  Outcome: Met This Shift

## 2022-03-15 NOTE — PROGRESS NOTES
Physical Therapy    [id number]    Patient unavailable for physical therapy treatment due to unable to arouse patient in any fashion. Tom Parra  [facility]  LIC # [id number]

## 2022-03-15 NOTE — PROGRESS NOTES
OCCUPATIONAL THERAPY TREATMENT NOTE     [facility]  [facility] Violette Mercado. [place]         Date:3/15/2022  Patient Name: Carl Rosario  MRN: [id number]  : 1955  Room: [facility]/5956-05            Evaluating OT: Neli Fowler OTR/L; [id number]      Referring Provider and Specific Provider Orders/Date:      22   OT eval and treat  Start:  22,   End:  22, Yunier Blevins,   Juliana Count:  1 Occurrences,   R         Twyla Scott MD      Placement Recommendation: Subacute          Diagnosis:   1. Acute respiratory failure with hypoxia ([address]    2.  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    Cerebrovascular disease      Clotting disorder ([address] 1985     thrombophlebitis after c section delivery    Depression       15 years followed by dr Kothari Primes Hemod[facility] patient [facility])      History of blood transfusion      Hyperlipidemia      Hypertension      Hypothyroidism      Leg swelling 2020    Lymphedema of both lower extremities 2020    Peripheral vascular disease ([address]      Renal failure 2012     renal transplant    Thrombophlebitis       after c section delivery    Thyroid disease              Past Surgical History         Past Surgical History:   Procedure Laterality Date     SECTION   1985     one normal delivery    COLECTOMY N/A 1/15/2022     DIAGNOSTIC  LAPAROSCOPY LYSIS OF ADHESIONS performed by Kamila Laird MD at [address] [facility] CATH LAB PROCEDURE        normal coronaries and 1996 normal coronaries    DIALYSIS FISTULA CREATION   march and july 2012     phase one and two patient will start dialysis when needed   [place]y     transfemoral venous bypass grafts    IR IVC FILTER PLACEMENT W IMAGING   2/26/2022     IR IVC FILTER PLACEMENT W IMAGING 2/26/2022 [facility]ED[facility]    KIDNEY TRANSPLANT   2016    KIDNEY TRANSPLANT   2015    KIDNEY TRANSPLANT   2015    PARATHYROIDECTOMY   2006     left parathyroid  implant and parathyroidectomy    TRANSESOPHAGEAL ECHOCARDIOGRAM N/A 2/28/2022     TRANSESOPHAGEAL ECHOCARDIOGRAM WITH BUBBLE STUDY performed by Jake Ross MD at [facility] ENDOSCOPY         Precautions:  Fall Risk, L UE edema, non-ambulatory      Assessment of current deficits:     [x]? Functional mobility            [x]?ADLs           [x]? Strength                   []?Cognition    [x]? Functional transfers          [x]? IADLs         []? Safety Awareness   [x]? Endurance    [x]? Fine Coordination              [x]? Balance      []? Vision/perception    []? Sensation      [x]? Gross Motor Coordination  [x]? ROM           []? Delirium                   [x]?  Motor Control      OT PLAN OF CARE   OT POC based on physician orders, patient diagnosis and results of clinical assessment     Frequency/Duration 1-3 days/wk for 2 weeks PRN      Specific OT Treatment Interventions to include:   * Instruction/training on adapted ADL techniques and AE recommendations to increase functional independence within precautions       * Training on energy conservation strategies, correct breathing pattern and techniques to improve independence/tolerance for self-care routine  * Functional transfer/mobility training/DME recommendations for increased independence, safety, and fall prevention  * Patient/Family education to increase follow through with safety techniques and functional independence  * Recommendation of environmental modifications for increased safety with functional transfers/mobility and ADLs  * Splinting/positioning for increased function, prevention of contractures, and improve skin integrity  * Therapeutic exercise to improve motor endurance, ROM, and functional strength for ADLs/functional transfers  * Therapeutic activities to facilitate/challenge dynamic balance, stand tolerance for increased safety and independence with ADLs  * Positioning to improve skin integrity, interaction with environment and functional independence     Recommended Adaptive Equipment: none       Home Living: pt came to us from [facility] and [place] [address] owned: SNF equipment      Prior Level of Function: Dependnet with ADLs , Dependent with IADLs; non-ambulatory     Driving: no  Occupation: not working     Pain Level: pain in L UE, pt could not rate level of pain; Nursing notified.        Cognition: A&O: 2/4; Follows 1 step directions              Memory: fair               Sequencing: fair               Problem solving: fair               Judgement/safety: fair      [facility]   AM-PAC Daily Activity Inpatient   How much help for putting on and taking off regular lower body clothing?: Total  How much help for Bathing?: A Lot  How much help for Toileting?: Total  How much help for putting on and taking off regular upper body clothing?: A Lot  How much help for taking care of personal grooming?: A Lot  How much help for eating meals?: A Lot  AM-[facility] Inpatient Daily Activity Raw Score: 10  AM-PAC Inpatient ADL T-Scale Score : 27.31  ADL Inpatient CMS 0-100% Score: 74.7  ADL Inpatient CMS G-Code Modifier : CL                Functional Assessment:     Initial Eval Status  Date: 3/10/22 Treatment Status  Date: 3/15/22 STGs = LTGs  Time frame: 10-14 days   Feeding Maximal Assist   Dep pt NPO lethargic to follow v/c's   Minimal Assist    Grooming Dependent to brush hair and work ofn removing large knot from back of hair  Dep pt not following v/c's to bring hand to face to complete grooming tasks Minimal Assist    UB Dressing Maximal assist  n/t Minimal Assist    LB Dressing Dependent  Dep  N.A     Bathing Maximal Assist N/t  Moderate Assist   Toileting Dependent for hygiene as pt was incontinent of bowel  n/t Moderate Assist    Bed Mobility  Supine to sit: Dependent x 2    Sit to supine: Dependent x 2    Dep x2 to roll side/side  Supine to sit: Minimal Assist x 2    Sit to supine: Minimal Assist x 2     Balance Sitting:     Static: poor at EOB with maximal assist progressing to minimal assist     Dynamic: poor   Standing: N/T  n/t     Activity Tolerance Poor   poor  fair    Visual/  Perceptual Glasses: no                       Pt long sitting in bed engaged in B UE PROM 2 x 20 reps in all planes fist pumps, elbow flexion/extension, shoulder flexion/extension focusing on edema management, UE strength/endurance to increase independence with ADL tasks. Comments: Upon arrival pt supine in bed, lethargic but agreeable to therapy session. Pt educated with regards to bed mobility, B UE PROM, edema management. At end of session pt supine in bed, with HOB elevated past 30* for tube feed,  all lines and tubes intact, call light within reach. · Pt has made limited  progress towards set goals.    · Continue with current plan of care      Treatment Time AF:3142            Treatment Time BNU:1775               Treatment Charges: Mins Units   Ther Ex  71136 23 2   Manual Therapy Parva Jim[address] [phone] [id number]     ADL/Home Mgt [id number]     Neuro Re-ed [id number]     Group Therapy      Orthotic manage/training  22040     Non-Billable Time     Total Timed Treatment [address] YANCEY/L [id number]

## 2022-03-15 NOTE — PROGRESS NOTES
[facility] MD Pita García MD Jewelene Shows, MD Monia Arthurs MD  Progress Note    Patient's Name: Emmy Estimable  11:21 AM  3/15/2022    Subjective  3/13 : seen in her room  Weak decondioned lying flat in bed , poor po intake , clinically euvolemic   3/14: NG tube not functioning, just replaced. No meds all day as a result. IV fluids been stopped for few hours, as well. 3/15 : seen today , o2/nc . Appear comfortable . TF going at goal . Per RN pt is more alert and awake   UE edema B/L 2+ . History of Present Ilness:      Patient has h/o preserved left ventricular systolic function (CLEMENCIA  6/49/3776), stress test with no significant reversibility 6/16/2015, paroxysmal atrial fibrillation (on Eliquis), chronic left bundle branch block, HTN, HLD,  s/p kidney transplant,CKD, H/o DVT, s/p IVC on AC now, PVD, s/p thyroidectomy for cancer, gout, obesity     She was recently treated in hospitalfrom 2/25/2020 to 3/7/2022 because of acute hypoxic respiratory failure likely due to aspiration pneumonia with positive blood culture and acute metabolic encephalopathy     Patient was brought from [id number] [facility] and rehab facility to ED of [facility] on 3/9/22 due to new mental status changes. Nephrology asked to see for CECILY /CKD   Pt has hx of cadaveric kidney transplant 7 years back   Her baseline cr has been 1.2-1.6 with a lot of fluctuation in contest of dehydration . Pt seen in her room , she is very weak and deconditoned to my eyes she has lost a lot of weight .    She has a hassan in place   She has poor po intake   She is euvolemic to mildly hypovolemic       Allergies:  Macrodantin [nitrofurantoin macrocrystal] and Sulfa antibiotics    Current Medications:    magnesium sulfate 3,000 mg in dextrose 5 % 100 mL IVPB, Once  magnesium oxide (MAG-OX) tablet 400 mg, Daily  sodium bicarbonate 100 mEq in dextrose 5 % 1,000 mL infusion, Continuous  furosemide (LASIX) injection 40 mg, BID  albumin human 25 % IV solution 25 g, BID  potassium chloride (KLOR-CON M) extended release tablet 20 mEq, Once  docusate (COLACE) 50 MG/5ML liquid 100 mg, BID  ipratropium-albuterol (DUONEB) nebulizer solution 1 ampule, Q4H  acyclovir (ZOVIRAX) 350 mg in dextrose 5 % 50 mL IVPB, Q12H  metronidazole (FLAGYL) 500 mg in NaCl 100 mL IVPB premix, Q8H  midodrine (PROAMATINE) tablet 5 mg, BID PRN  lidocaine 1 % injection 5 mL, Once  sodium chloride flush 0.9 % injection 5-40 mL, 2 times per day  sodium chloride flush 0.9 % injection 5-40 mL, PRN  0.9 % sodium chloride infusion, PRN  heparin flush 100 UNIT/ML injection 100 Units, 2 times per day  heparin flush 100 UNIT/ML injection 100 Units, PRN  [Held by provider] metoprolol tartrate (LOPRESSOR) tablet 12.5 mg, BID  Fidaxomicin (DIFICID) tablet 200 mg, BID  levoFLOXacin (LEVAQUIN) 750 MG/150ML infusion 750 mg, Q48H  apixaban (ELIQUIS) tablet 5 mg, BID  desvenlafaxine succinate (PRISTIQ) extended release tablet 50 mg, Daily  ondansetron (ZOFRAN-ODT) disintegrating tablet 4 mg, Q8H PRN  atorvastatin (LIPITOR) tablet 10 mg, Nightly  cloZAPine (CLOZARIL) tablet 50 mg, Nightly  levothyroxine (SYNTHROID) tablet 50 mcg, Daily  pantoprazole (PROTONIX) tablet 40 mg, QAM AC  cycloSPORINE (SANDIMMUNE) capsule 100 mg, BID  mycophenolate (CELLCEPT) capsule 500 mg, BID  predniSONE (DELTASONE) tablet 5 mg, Daily  allopurinol (ZYLOPRIM) tablet 039 mg, Daily  folic acid (FOLVITE) tablet 1 mg, Daily        Review of Systems:   Constitutional: weakness   Eyes: no eye pain , no itching , no drainage  Ears, nose, mouth, throat, and face: no ear ,nose pain , hearing is ok ,no nasal drainage   Respiratory: no sob ,no cough ,no wheezing . Cardiovascular: no chest pain , no palpitation ,no sob . Gastrointestinal: no nausea, vomiting , constipation , no abdominal pain . Genitourinary:no urinary retention , no burning , dysuria .  No polyuria   Hematologic/lymphatic: no bleeding , no cougulation issues . Musculoskeletal:no joint pain , no swelling . Neurological: no headaches ,no weakness , no numbness . Endocrine: no thirst , no weight issues . Physical exam:   Vital signs /83   Pulse 104   Temp 99 °F (37.2 °C) (Oral)   Resp 24   Ht 5' 2\" (1.575 m)   Wt 226 lb (102.5 kg)   SpO2 92%   BMI 41.34 kg/m²   Gen : In NAD , appears stated age . Head : NC/AT, EOMI, sclera and conjunctive are clear and anicteric. Mucous membranes dry  Neck : supple , no thyromegaly noted . Trachea midline  CV : regular rhythm, normal S1 and S2, No M/R/G . Lungs: CTAB , no wheezing , good air entry in all fields, though poor inspiratory effort  Abd : soft , NT , BS +   Skin : soft, dry . Neuro : CN  II-XII grossly intact , no focal neurologic deficit .    Psych : affect flat, minimally responsive, mostly to tactile stimulation, does not answer questions or follow commands    Data:   Labs:  CBC with Differential:    Lab Results   Component Value Date    WBC 4.8 03/15/2022    RBC 2.52 03/15/2022    HGB 7.9 03/15/2022    HCT 27.4 03/15/2022     03/15/2022    .7 03/15/2022    MCH 31.3 03/15/2022    MCHC 28.8 03/15/2022    RDW 15.7 03/15/2022    NRBC 1.0 01/13/2022    METASPCT 1.0 03/15/2022    LYMPHOPCT 12.0 03/15/2022    MONOPCT 4.0 03/15/2022    MYELOPCT 1.0 03/13/2022    BASOPCT 0.0 03/15/2022    MONOSABS 0.19 03/15/2022    LYMPHSABS 0.58 03/15/2022    EOSABS 0.19 03/15/2022    BASOSABS 0.00 03/15/2022     CMP:    Lab Results   Component Value Date     03/15/2022    K 3.9 03/15/2022    K 4.7 03/09/2022     03/15/2022    CO2 17 03/15/2022    BUN 36 03/15/2022    CREATININE 2.3 03/15/2022    GFRAA 26 03/15/2022    LABGLOM 21 03/15/2022    GLUCOSE 118 03/15/2022    PROT 3.9 03/15/2022    LABALBU 1.9 03/15/2022    CALCIUM 8.6 03/15/2022    BILITOT 0.4 03/15/2022    ALKPHOS 61 03/15/2022    AST 23 03/15/2022    ALT 17 03/15/2022     Ionized Calcium:  No results found for: IONCA  Magnesium:    Lab Results   Component Value Date    MG 1.2 03/15/2022     Phosphorus:    Lab Results   Component Value Date    PHOS 3.4 03/15/2022     U/A:    Lab Results   Component Value Date    COLORU Yellow 03/09/2022    PHUR 5.0 03/09/2022    WBCUA 1-3 03/09/2022    RBCUA NONE 03/09/2022    RBCUA 10-20 03/12/2014    YEAST Present 04/09/2021    BACTERIA MODERATE 03/09/2022    CLARITYU Clear 03/09/2022    SPECGRAV 1.025 03/09/2022    LEUKOCYTESUR Negative 03/09/2022    UROBILINOGEN 0.2 03/09/2022    BILIRUBINUR Negative 03/09/2022    BLOODU Negative 03/09/2022    GLUCOSEU Negative 03/09/2022     Microalbumen/Creatinine ratio:  No components found for: RUCREAT  Iron Saturation:  No components found for: PERCENTFE  TIBC:    Lab Results   Component Value Date    TIBC 135 01/19/2022     FERRITIN:    Lab Results   Component Value Date    FERRITIN 4,062 01/23/2022        Imaging:  XR ABDOMEN FOR NG/OG/NE TUBE PLACEMENT   Final Result   Satisfactory position of the enteric tube. XR CHEST PORTABLE   Final Result   Addendum 1 of 1   ADDENDUM:   The floor reports slow nasogastric tube output. Based on positioning on   plain film, recommend further advancement. Final   1. Possible right pleural effusion and right basilar atelectasis. 2. Cardiomegaly without pulmonary venous hypertension. 3. Possible hiatal hernia. XR ABDOMEN FOR NG/OG/NE TUBE PLACEMENT   Final Result   Enteric tube tip in the fundus of the stomach. US RETROPERITONEAL COMPLETE   Final Result   No hydronephrosis of the transplant kidney. The bladder is decompressed by a Chin catheter. IR FLUORO GUIDED CVA DEVICE PLMT/REPLACE/REMOVAL   Final Result   Successful placement of a dual lumen power PICC line. The tip of the PICC   line catheter was placed within the distal right subclavian vein. Megan Swenson       IMPRESSION:   Successful ultrasound and fluoroscopy guided PICC placement IR ULTRASOUND GUIDANCE VASCULAR ACCESS   Final Result   Successful placement of a dual lumen power PICC line. The tip of the PICC   line catheter was placed within the distal right subclavian vein. Hanane Crouch IMPRESSION:   Successful ultrasound and fluoroscopy guided PICC placement         CT HEAD WO CONTRAST   Final Result   No acute intracranial abnormality or hemorrhage. Bilateral mastoiditis and right sphenoid sinusitis. XR SHOULDER LEFT (MIN 2 VIEWS)   Final Result   No fracture. Increased separation of the humeral head from the osseous   glenoid and AC joint, which could represent subluxation. Otherwise, a large   synovial effusion, hematoma, or mass cannot be excluded. Clinical   correlation is recommended. CT scan of the left shoulder can be performed   for further evaluation. Cardiomegaly. XR HUMERUS LEFT (MIN 2 VIEWS)   Final Result   No fracture or dislocation. Increased separation of the humeral head from   the osseous glenoid and AC joint, which could represent subluxation. A large   synovial effusion, hematoma, or mass cannot be excluded. CT scan of the left   shoulder can be considered for further evaluation. XR CHEST PORTABLE   Final Result   1. Right perihilar discoid atelectasis. 2. There are no findings of failure or pneumonia. Assessment    -Acute kidney injury : improving continue iv fluids though will decrease rate now she is on TF     -Hyperchrloremic acidosis :due to NS infusion along with CECILY, CKD : will change iv fluids to include some bicarbonate .  Will start nahco3 tablets     -Hx of  donor kidney transplant : continue current immunosuppressent     -Immunosuppression host :  continue current immunosuppressent     -Anaemia of chronic disease : some of the anaemia is dilutional . Will start retacrit 5000 u sc q week     -Edema mainly in UE : will give lasix along spa x 2 doses     -Hypo Magensiemia : replace     -severe deconditioning : PT/OT         Electronically signed by Yolanda Dominique MD on 3/15/2022

## 2022-03-15 NOTE — PROGRESS NOTES
[facility]     [address] [place] [address]  [place], [place] [employer]  Phone [phone]   [phone]      Admit Date: 3/9/2022 10:02 AM  Pt Name: Emmy Arias  MRN: [id number]  : 1955  Reason for Consult:    Chief Complaint   Patient presents with    Altered Mental Status     from NH     Requesting Physician:  Nile Lara MD  PCP: Darwin Goff MD  History Obtained From:  patient, chart   ID consulted for Confusion [R41.0]  Acute respiratory failure with hypoxia ([address] [J96.01]  Acute respiratory failure with hypoxemia ([address] [J96.01]  on hospital day 13[facility]       Chief Complaint   Patient presents with    Altered Mental Status     from NH     HISTORYOF PRESENT ILLNESS   Emmy Arias is a 73 y.o. female who presents with   has a past medical history of Abnormal EKG, Acute gout involving toe of right foot, Acute on chronic combined systolic (congestive) and diastolic (congestive) heart failure ([address] Cancer ([address] Cerebrovascular disease, Clotting disorder ([address] Depression, Hemodialysis patient ([address] History of blood transfusion, Hyperlipidemia, Hypertension, Hypothyroidism, Leg swelling, Lymphedema of both lower extremities, Peripheral vascular disease ([address] Renal failure, Thrombophlebitis, and Thyroid disease. Altered Mental Status      Know to ID   · Last seen on 3/2/2022-d/c on Augmentin cover poss aspiration and colitis  Pt came in due to altered mental status. Wbc24.7 hgb10.8 ott055 cr1.6 TMAX99.3  PT WAS SEEN AND EXAMINED WITH NURSES PRESENT  PT IS NOT AROUSABLE UNABLE TO OBTAIN HISTORY    PT HAS SOFT STOOLS SOME ODOR  SACRAL AREA VIEWED HAS SMALL DTI AREA  CXRY 1. Right perihilar discoid atelectasis. 2. There are no findings of failure or pneumonia.    LEFT SHOULDER No fracture.  Increased separation of the humeral head from the osseous   glenoid and AC joint, which could represent subluxation.  Otherwise, a large   synovial effusion, hematoma, or mass cannot be excluded.  Clinical   correlation is recommended.  CT scan of the left shoulder can be performed   for further evaluation.  Cardiomegaly. LEFT HUMERUS No fracture or dislocation.  Increased separation of the humeral head from   the osseous glenoid and AC joint, which could represent subluxation.  A large   synovial effusion, hematoma, or mass cannot be excluded.  CT scan of the left   shoulder can be considered for further evaluation. piperacillin-tazobactam (ZOSYN) 3,375 mg in dextrose 5 % 50 mL IVPB extended infusion (mini-bag), Q8H  vancomycin (VANCOCIN) oral solution 125 mg, 4 times per day  cycloSPORINE (SANDIMMUNE) capsule 100 mg, BID  mycophenolate (CELLCEPT) capsule 500 mg, BID  metoprolol succinate (TOPROL XL) extended release tablet 50 mg, Daily  predniSONE (DELTASONE) tablet 5 mg, Daily     LAST BLOOD CX  cons   CLEMENCIA  Summary   Technically difficult study. No definate evidence of vegetation consistent with endocarditis. Normal LV segmental wall motion. Ejection fraction is visually estimated at 60 to 65%. Normal right ventricular size and function.     DOS  03/15/22   In bed responds a little has ngt   Nurse/aide present    3/14/2022  In bed more awake has ngt  afebrile 2L cr2.95 wbc4.7     3/13/2022  In bed briefly arouses   Does not stay awake   Spoke with nurse unable to give meds    3/12/2022  briefly arouses has no abd pain    3/11/2022  In bed open eyes briefly still lethargic  cdiff +  REVIEW OF SYSTEMS     CONSTITUTIONAL:   AS IN HPI     Medications Prior to Admission: pantoprazole (PROTONIX) 40 MG tablet, Take 1 tablet by mouth every morning (before breakfast)  [] amoxicillin-clavulanate (AUGMENTIN) 875-125 MG per tablet, Take 1 tablet by mouth every 12 hours for 10 days  apixaban (ELIQUIS) 5 MG TABS tablet, Take 5 mg by mouth 2 times daily Take in the morning and at bedtime for 3 months (started 2/19/2022)  atorvastatin (LIPITOR) 10 MG tablet, Take 10 mg by mouth daily  lisinopril (PRINIVIL;ZESTRIL) 5 MG tablet, take 1 tablet by mouth once daily  pregabalin (LYRICA) 75 MG capsule,   nystatin (MYCOSTATIN) 645157 UNIT/GM powder, Apply 3 times daily. ipratropium-albuterol (DUONEB) 0.5-2.5 (3) MG/3ML SOLN nebulizer solution, Inhale 3 mLs into the lungs every 6 hours as needed for Shortness of Breath  ondansetron (ZOFRAN-ODT) 4 MG disintegrating tablet, Take 1 tablet by mouth every 8 hours as needed for Nausea or Vomiting  allopurinol (ZYLOPRIM) 100 MG tablet, Take 100 mg by mouth daily  Cyanocobalamin (B-12 COMPLIANCE INJECTION) 1000 MCG/ML KIT, Inject as directed monthly  metoprolol succinate (TOPROL XL) 50 MG extended release tablet, take 1 tablet by mouth once daily  desvenlafaxine succinate (PRISTIQ) 50 MG TB24 extended release tablet, Take 1 tablet by mouth every evening  cloZAPine (CLOZARIL) 50 MG tablet, Take 50 mg by mouth nightly  cycloSPORINE (SANDIMMUNE) 100 MG capsule, Take 100 mg by mouth 2 times daily  predniSONE (DELTASONE) 5 MG tablet, Take 5 mg by mouth daily  folic acid (FOLVITE) 1 MG tablet, Take 1 mg by mouth daily  docusate sodium (COLACE) 100 MG capsule, Take 100 mg by mouth 2 times daily  mycophenolate (CELLCEPT) 500 MG tablet, Take 1,000 mg by mouth 2 times daily  levothyroxine (SYNTHROID) 50 MCG tablet, Take 50 mcg by mouth daily.     budesonide (PULMICORT) 0.5 MG/2ML nebulizer suspension, Take 2 mLs by nebulization 2 times daily for 10 days  CURRENT MEDICATIONS     Current Facility-Administered Medications:     magnesium sulfate 3,000 mg in dextrose 5 % 100 mL IVPB, 3,000 mg, IntraVENous, Once, Ras Del Toro MD    magnesium oxide (MAG-OX) tablet 400 mg, 400 mg, Oral, Daily, Ras Del Toro MD    potassium chloride (KLOR-CON M) extended release tablet 20 mEq, 20 mEq, Oral, Once, Pankaj Urias MD    docusate (COLACE) 50 MG/5ML liquid 100 mg, 100 mg, Per NG tube, BID, Heba L MD James, 100 mg at 03/15/22 0920    ipratropium-albuterol (Tanda Nikolay) nebulizer solution 1 ampule, 1 ampule, Inhalation, Q4H, Audra Bailey MD, 1 ampule at 03/15/22 0608    acyclovir (ZOVIRAX) 350 mg in dextrose 5 % 50 mL IVPB, 5 mg/kg (Adjusted), IntraVENous, Q12H, Carolina Troy MD, Stopped at 03/15/22 0050    metronidazole (FLAGYL) 500 mg in NaCl 100 mL IVPB premix, 500 mg, IntraVENous, Q8H, Carolina Troy MD, Stopped at 03/15/22 0729    dextrose 5 % and 0.9 % NaCl with KCl 20 mEq infusion, , IntraVENous, Continuous, Chanel Melendez MD, Last Rate: 100 mL/hr at 03/15/22 0628, New Bag at 03/15/22 0628    midodrine (PROAMATINE) tablet 5 mg, 5 mg, Oral, BID PRN, Ramez Peter MD    lidocaine 1 % injection 5 mL, 5 mL, IntraDERmal, Once, Ramez Peter MD    sodium chloride flush 0.9 % injection 5-40 mL, 5-40 mL, IntraVENous, 2 times per day, Ramez Peter MD, 10 mL at 03/14/22 2308    sodium chloride flush 0.9 % injection 5-40 mL, 5-40 mL, IntraVENous, PRN, Jossue Ramirez MD    0.9 % sodium chloride infusion, 25 mL, IntraVENous, PRN, Ramez Peter MD    heparin flush 100 UNIT/ML injection 100 Units, 1 mL, IntraVENous, 2 times per day, Ramez Peter MD, 100 Units at 03/14/22 2306    heparin flush 100 UNIT/ML injection 100 Units, 1 mL, IntraCATHeter, PRN, Ramez Peter MD    [Held by provider] metoprolol tartrate (LOPRESSOR) tablet 12.5 mg, 12.5 mg, Oral, BID, Helio Garibay MD    Fidaxomicin (DIFICID) tablet 200 mg, 200 mg, Oral, BID, Carolina Troy MD, 200 mg at 03/15/22 0920    levoFLOXacin (LEVAQUIN) 750 MG/150ML infusion 750 mg, 750 mg, IntraVENous, Q48H, Carolina Troy MD, Stopped at 03/13/22 1859    apixaban (ELIQUIS) tablet 5 mg, 5 mg, Oral, BID, Ramez Peter MD, 5 mg at 03/15/22 0906    desvenlafaxine succinate (PRISTIQ) extended release tablet 50 mg, 50 mg, Oral, Daily, Susannah Ramirez MD, 50 mg at 03/15/22 0915    ondansetron (ZOFRAN-ODT) disintegrating tablet 4 mg, 4 mg, Oral, Q8H PRN, Apple Badillo MD    atorvastatin (LIPITOR) tablet 10 mg, 10 mg, Oral, Nightly, Susannah Ramirez MD, 10 mg at 03/14/22 2308    cloZAPine (CLOZARIL) tablet 50 mg, 50 mg, Oral, Nightly, Susannah Ramirez MD, 50 mg at 03/14/22 2307    levothyroxine (SYNTHROID) tablet 50 mcg, 50 mcg, Oral, Daily, Susannah Ramirez MD, 50 mcg at 03/15/22 0630    pantoprazole (PROTONIX) tablet 40 mg, 40 mg, Oral, QAM AC, Susannah Ramirez MD, 40 mg at 03/15/22 0630    cycloSPORINE (SANDIMMUNE) capsule 100 mg, 100 mg, Oral, BID, Susannah Ramirez MD, 100 mg at 03/15/22 3921    mycophenolate (CELLCEPT) capsule 500 mg, 500 mg, Oral, BID, Susannah Ramirez MD, 500 mg at 03/15/22 0919    predniSONE (DELTASONE) tablet 5 mg, 5 mg, Oral, Daily, Susannah Ramirez MD, 5 mg at 03/15/22 0672    allopurinol (ZYLOPRIM) tablet 100 mg, 100 mg, Oral, Daily, Susannah Ramirez MD, 100 mg at 00/86/09 9090    folic acid (FOLVITE) tablet 1 mg, 1 mg, Oral, Daily, Apple Badillo MD, 1 mg at 03/12/22 8123  ALLERGIES     Macrodantin [nitrofurantoin macrocrystal] and Sulfa antibiotics  Immunization History   Administered Date(s) Administered    COVID-19, Pfizer Purple top, DILUTE for use, 12+ yrs, 30mcg/0.3mL dose 01/28/2021, 02/18/2021, 08/18/2021      Internal Administration   First Dose COVID-19, Pfizer Purple top, DILUTE for use, 12+ yrs, 30mcg/0.3mL dose  01/28/2021   Second Dose COVID-19, Pfizer Purple top, DILUTE for use, 12+ yrs, 30mcg/0.3mL dose   02/18/2021       Last COVID Lab SARS-CoV-2 (no units)   Date Value   01/20/2021 Not Detected     SARS-CoV-2 Antibody, Total (no units)   Date Value   01/13/2022 Non-Reactive     SARS-CoV-2, PCR (no units)   Date Value   02/25/2022 Not Detected     SARS-CoV-2, NAAT (no units)   Date Value   03/02/2022 Not Detected            PAST MEDICAL HISTORY     Past Medical History:   Diagnosis Date    Abnormal EKG     left bundle branch block    Acute gout involving toe of right foot 09/03/2020    Acute on chronic combined systolic (congestive) and diastolic (congestive) heart failure (HCC)     Cancer (HCC)     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    Cerebrovascular disease     Clotting disorder ([address] 1985    thrombophlebitis after c section delivery    Depression     15 years followed by dr Cherrie Chicas Hemodialysis patient [facility])     History of blood transfusion     Hyperlipidemia     Hypertension     Hypothyroidism     Leg swelling 09/03/2020    Lymphedema of both lower extremities 09/03/2020    Peripheral vascular disease ([address]     Renal failure 11/2012    renal transplant    Thrombophlebitis     after c section delivery    Thyroid disease      SURGICAL HISTORY       Past Surgical History:   Procedure Laterality Date   [address]    one normal delivery    COLECTOMY N/A 1/15/2022    DIAGNOSTIC  LAPAROSCOPY LYSIS OF ADHESIONS performed by Salvador Caputo MD at [address] [place] [address] Drive CATH LAB PROCEDURE  2006    normal coronaries and 1996 normal coronaries    DIALYSIS FISTULA CREATION  march and july 2012    phase one and two patient will start dialysis when needed   [address]    transfemoral venous bypass grafts    IR IVC FILTER PLACEMENT W IMAGING  2/26/2022    IR IVC FILTER PLACEMENT W IMAGING 2/26/2022 [facility] PROCEDURES    KIDNEY TRANSPLANT  2016    KIDNEY TRANSPLANT  2015    KIDNEY TRANSPLANT  2015    PARATHYROIDECTOMY  2006    left parathyroid  implant and parathyroidectomy    TRANSESOPHAGEAL ECHOCARDIOGRAM N/A 2/28/2022    TRANSESOPHAGEAL ECHOCARDIOGRAM WITH BUBBLE STUDY performed by Saritha Renteria MD at [facility]     ·  [address] [place] [address] Ave:    03/14/22 2000 03/15/22 0815 03/15/22 0829 03/15/22 0845   BP: (!) 130/54 134/83     Pulse: 98 104     Resp: 16 24     Temp: 98 °F (36.7 °C) 99 °F (37.2 °C)     TempSrc: Oral Oral     SpO2: 94% 92%     Weight:    226 lb (102.5 kg)   Height:   5' 2\" (1.575 m)      Physical Exam   Constitutional/General:  In bed   Head: NC/AT  Neck: Supple, full ROM,    Pulmonary: Lungs DEC  to auscultation bilaterally. ON O2   Cardiovascular:  Regular rate and rhythm  Abdomen: Soft, + BS.    distension. Nontender. ngt  Extremities:  Warm and well perfused dec rom   Skin: Warm and dry  No rash   Neurologic:     AROUSable   MERCHANT   3/11 midline   piv     DIAGNOSTIC RESULTS   RADIOLOGY:   ECHO Transesophageal    Result Date: 3/1/2022  Transesophageal Echocardiography Report (CLEMENCIA)   Demographics   Patient Name      Mariam Groves       Gender              Female                    [facility] Record    [id number]           Room Number         5463  Number   Account #         [de-identified]          Procedure Date      02/28/2022   Corporate ID                         Ordering Physician  Ovidio Garza MD   Accession Number  [id number]         Referring Physician   Date of Birth     1955         Sonographer         Jack Bhatti                                                           [facility]   Age               73 year(s)         Interpreting        Ovidio Garza MD                                       Physician                                        Any Other  Procedure Type of Study   CLEMENCIA procedure:Transesophageal Echo (CLEMENCIA), Color Flow Velocity Mapping. Procedure Date Date: 02/28/2022 Start: 11:00 AM Study Location: Portable Technical Quality: Adequate visualization Indications:R/O Endocarditis. Patient Status: Routine Height: 62 inches Weight: 220 pounds BSA: 1.99 m^2 BMI: 40.24 kg/m^2 BP: 138/79 mmHg CLEMENCIA Performed By: the attending and the sonographer  Type of Anesthesia: Moderate sedation   Findings   Left Ventricle  Normal LV segmental wall motion. Ejection fraction is visually estimated at 60 to 65%. Right Ventricle  Normal right ventricular size and function. Left Atrium  No evidence of thrombus within left atrium. Right Atrium  Normal right atrium size.   Agitated saline injected for shunt evaluation shows no shunt. Mitral Valve  No vegetation. Mild mitral regurgitation is present. Tricuspid Valve  No vegetation. Aortic Valve  No vegetation. The aortic valve appears mildly sclerotic. Pulmonic Valve  No vegetation. Conclusions   Summary  Technically difficult study. No definate evidence of vegetation consistent with endocarditis. Normal LV segmental wall motion. Ejection fraction is visually estimated at 60 to 65%. Normal right ventricular size and function. Signature   ----------------------------------------------------------------  Electronically signed by Dannielle Adams MD(Interpreting  physician) on 03/01/2022 09:11 AM  ----------------------------------------------------------------  [id number]    CT HEAD WO CONTRAST    Result Date: 2/25/2022  EXAMINATION: CT OF THE HEAD WITHOUT CONTRAST  2/25/2022 3:08 am TECHNIQUE: CT of the head was performed without the administration of intravenous contrast. Dose modulation, iterative reconstruction, and/or weight based adjustment of the mA/kV was utilized to reduce the radiation dose to as low as reasonably achievable. COMPARISON: Correlation with MRI dated 01/16/2022 HISTORY: ORDERING SYSTEM PROVIDED HISTORY: mental status change TECHNOLOGIST PROVIDED HISTORY: Has a \"code stroke\" or \"stroke alert\" been called? ->No Reason for exam:->mental status change Decision Support Exception - unselect if not a suspected or confirmed emergency medical condition->Emergency Medical Condition (MA) FINDINGS: BRAIN/VENTRICLES: There is no acute intracranial hemorrhage, mass effect or midline shift. No abnormal extra-axial fluid collection. The gray-white differentiation is maintained without evidence of an acute infarct. There is no evidence of hydrocephalus. Minimal cortical volume loss.   Scattered vascular calcifications. ORBITS: The visualized portion of the orbits demonstrate no acute abnormality. SINUSES: Significant opacification of the left greater than right mastoid air cells similar to previous. Mild mucosal thickening in the left sphenoid sinus. This was also present previously. SOFT TISSUES/SKULL:  No acute abnormality of the visualized skull or soft tissues. No acute intracranial abnormality. MRI would be useful if symptoms persist. Inflammatory changes of left greater than right mastoid air cells and left sphenoid sinus similar to prior MRI. RECOMMENDATIONS: Unavailable     NM LUNG VENT/PERFUSION (VQ)    Result Date: 2/25/2022  EXAMINATION: NUCLEAR MEDICINE VENTILATION PERFUSION SCAN. 2/25/2022 TECHNIQUE: 3.5 millicuries aerosolized Tc99m DTPA was administered via mask prior to planar imaging of the lungs in multiple projections. Then, 4.2 millicuries of Tc 54M MAA was administered intravenously prior to planar imaging of the lungs in similar projections. COMPARISON: Chest CT February 25, 2022 . HISTORY: ORDERING SYSTEM PROVIDED HISTORY: R/O PE TECHNOLOGIST PROVIDED HISTORY: Reason for exam:->R/O PE What reading provider will be dictating this exam?->CRC FINDINGS: PERFUSION: Distribution of radiotracer is homogeneous. No segmental defects identified. VENTILATION: Ventilation images are unremarkable. CHEST CT: Small infiltrate or atelectasis posterior right lung. Negative for pulmonary embolus. CT ABDOMEN PELVIS W IV CONTRAST Additional Contrast? None    Result Date: 2/25/2022  EXAMINATION: CT OF THE ABDOMEN AND PELVIS WITH CONTRAST 2/25/2022 3:08 am TECHNIQUE: CT of the abdomen and pelvis was performed with the administration of intravenous contrast. Multiplanar reformatted images are provided for review. Dose modulation, iterative reconstruction, and/or weight based adjustment of the mA/kV was utilized to reduce the radiation dose to as low as reasonably achievable.  COMPARISON: 01/17/2022 HISTORY: ORDERING SYSTEM PROVIDED HISTORY: Abdominal distention, AMS TECHNOLOGIST PROVIDED HISTORY: Reason for exam:->Abdominal distention, AMS Additional Contrast?->None Decision Support Exception - unselect if not a suspected or confirmed emergency medical condition->Emergency Medical Condition (MA) FINDINGS: Many images are partially degraded by patient motion related artifact. Low-attenuation focus in the central aspect of the left lobe of the liver as well as a tiny low-attenuation focus in the caudate lobe. There is also a tiny low-attenuation focus in the extreme inferior aspect of the right lobe. These are difficult to definitively characterize due to their small size but likely represent small hepatic cysts. Gallbladder is unremarkable. Spleen is normal in size. No evidence of acute pancreatitis. There is a E small exophytic cystic appearing lesion along the anterior aspect of the body of the pancreas measuring 7 Hounsfield units and 1.4 cm. This is unchanged dating back to 11/17/2021. This is also favored to be incidental but could be followed. No adrenal mass. No hydronephrosis. The native kidneys remains significantly atrophic and contain numerous cystic lesions. Some of these are hyperdense but also unchanged dating back to the 11/17/2021 exam and favored to represent hyperdense or hemorrhagic cysts. Small renal cortical calcifications and or nonobstructing stones. A right lower quadrant renal transplant demonstrates no hydronephrosis. Small hiatal hernia. Mild fluid distention of a few small bowel loops in the upper abdomen. No bowel obstruction. The cecum is mobile. The appendix is not identified with certainty. There is some localized thickening of the proximal ascending colon. This does appear to be new compared to the previous examination. Moderate stool in the distal colon. Residual contrast in the colon likely due to contrast administered on the prior examination.  Uterus is atrophic. Urinary bladder is largely decompressed with Chin catheter. Minimal gas in the bladder likely due to the catheter. Partial visualization of femoral to femoral bypass graft. Degenerative changes are present in the spine and pelvis. Please see separate dictated report for CT of the chest.     Thickening of the proximal ascending colon. Given the short-term change, this is favored to represent a localized area of colitis likely infectious or inflammatory. Neoplasia thought less likely but follow-up to resolution would be recommended. Moderate stool in the distal colon. Minimal distention of several proximal small bowel loops likely incidental or due to mild enteritis. Small hiatal hernia. Other chronic appearing findings. RECOMMENDATIONS: Unavailable     IR GUIDED IVC FILTER PLACEMENT    Result Date: 2/26/2022  EXAMINATION: INFERIOR VENA CAVA (IVC) FILTER INSERTION 2/26/2022 Procedural Personnel: Estrellita Bañuelos MD HISTORY: Indication: Bleeding with IV heparin ORDERING SYSTEM PROVIDED HISTORY: IVC filter TECHNOLOGIST PROVIDED HISTORY: Reason for exam:->IVC filter Anticoagulation contraindicated SEDATION: None CONTRAST: Contrast agent: Isovue 320 Contrast volume: 30mL FLUOROSCOPY DOSE AND TYPE OR TIME AND EXPOSURES: Fluoroscopy time/Number of Images: Fluoroscopy time 1.5 minutes. Reference [facility] [place] kerma: [id number] PROCEDURE: Informed consent for the procedure including risks, benefits and alternatives was obtained and time-out was performed prior to the procedure. Universal protocol was followed. A suitable skin site was prepared and draped using all elements of maximal sterile barrier technique including sterile gloves, sterile gown, cap, mask, large sterile sheet, sterile ultrasound probe cover, hand hygiene, and cutaneous antisepsis. Time-out was called and the patient's order and identity were verified. Local anesthesia was administered.  The vessel was sonographically evaluated and judged appropriate for access. Real time ultrasound was used to visualize needle entry into the vessel and an ultrasound image was stored in PACS. Vein accessed: Right common femoral vein. Access technique: Micropuncture set with 21 gauge needle A 0.035 guide wire was used to place a catheter into the inferior vena cava. A vena cavogram was performed. A Bard Rosa filter was deployed in routine fashion in the infrarenal IVC under fluoroscopic guidance. The sheath was removed and hemostasis was achieved with manual compression. A sterile dressing was applied. Patient tolerated procedure well. Complications: No immediate complications. Standardized report: [email or web address] [id number] FINDINGS: US: The access vein is patent. Inferior Vena Cavogram: The vena cava is patent and normal in size without thrombus or anomalies. Post-placement imaging: Spot filmdemonstrates the filter in the infrarenalvena cava with less than 15 degrees tilt from the vena cava access. Successful vena cava filter placement. PLAN: Retrieval intent (MIPS): The filter is potentially retrievable. Plan/Timing For Retrieval: Ordering Physician/Contact For Retrieval Plan: Ian De La Rosa     XR CHEST PORTABLE    Result Date: 3/9/2022  EXAMINATION: ONE XRAY VIEW OF THE CHEST 3/9/2022 10:28 am COMPARISON: 02/25/2022 HISTORY: ORDERING SYSTEM PROVIDED HISTORY: ams TECHNOLOGIST PROVIDED HISTORY: Reason for exam:->ams FINDINGS: The cardiac silhouette is at upper limits of normal in size. There are no findings of failure There is a linear focus seen within the right perihilar region favored to represent atelectasis. There is no focal consolidation seen within the right or left lung to suggest pneumonia. There is no pleural effusion. 1. Right perihilar discoid atelectasis. 2. There are no findings of failure or pneumonia.      XR CHEST 1 VIEW    Result Date: 2/25/2022  EXAMINATION: ONE XRAY VIEW OF THE CHEST 2/25/2022 2:49 pm COMPARISON: None. HISTORY: ORDERING SYSTEM PROVIDED HISTORY: needs to be done with VQ scan TECHNOLOGIST PROVIDED HISTORY: Last chest Xray done more than 24 hours ago Reason for exam:->needs to be done with VQ scan FINDINGS: The heart is mildly enlarged. There are no findings of failure. The lungs are clear. There is no focal infiltrate or effusion. .     1. Mild cardiomegaly. There is no evidence of failure or pneumonia. CTA PULMONARY W CONTRAST    Result Date: 2/25/2022  EXAMINATION: CTA OF THE CHEST 2/25/2022 3:08 am TECHNIQUE: CTA of the chest was performed after the administration of intravenous contrast.  Multiplanar reformatted images are provided for review. MIP images are provided for review. Dose modulation, iterative reconstruction, and/or weight based adjustment of the mA/kV was utilized to reduce the radiation dose to as low as reasonably achievable. COMPARISON: Portable chest dated 01/22/2022 and CT dated 01/17/2022 HISTORY: ORDERING SYSTEM PROVIDED HISTORY: Hypoxic, SOB TECHNOLOGIST PROVIDED HISTORY: Reason for exam:->Hypoxic, SOB Decision Support Exception - unselect if not a suspected or confirmed emergency medical condition->Emergency Medical Condition (MA) FINDINGS: Pulmonary Arteries: Assessment is limited due to patient motion artifact. A small or peripheral embolism would be difficult to exclude but no large or central embolism identified. Mediastinum: Moderately severe cardiomegaly is similar to previous. No pericardial effusion. No aortic dissection. Scattered vascular calcifications. Normal-size lymph nodes without adenopathy by size criteria. Lungs/pleura: No pneumothorax. Mild atelectasis in the right greater than left lung base. Otherwise, there has been significant improvement in aeration of the lungs since previous.  Upper Abdomen: Partial visualization of the upper right kidney with cystic appearing foci as well as a partially visualized hyperdense lesion likely representing a hyperdense proteinaceous cyst and similar to previous but continued follow-up would be useful. Visualized portions of the upper abdomen demonstrate no acute abnormality. Small hiatal hernia. Soft Tissues/Bones: Degenerative changes are scattered in the spine. Allowing for patient motion artifact, no identified pulmonary embolism. Significant improvement in aeration of the lungs with some residual areas of presumed atelectasis in the right greater than left base. Cardiomegaly. RECOMMENDATIONS: Unavailable     US DUP UPPER EXTREMITY LEFT VENOUS    Result Date: 2/27/2022  EXAMINATION: VENOUS ULTRASOUND OF THE LEFT UPPER EXTREMITY, 2/27/2022 12:55 pm TECHNIQUE: Duplex ultrasound using B-mode/gray scaled imaging and Doppler spectral analysis and color flow was obtained of the deep venous structures of the left upper extremity. COMPARISON: None. HISTORY: ORDERING SYSTEM PROVIDED HISTORY: left arm edema TECHNOLOGIST PROVIDED HISTORY: Portable please if possible Reason for exam:->left arm edema What reading provider will be dictating this exam?->CRC FINDINGS: There is normal flow and compressibility of the visualized venous structures. There is no evidence of echogenic thrombus. The veins demonstrate good compressibility with normal color flow study and spectral analysis. The AV fistula is patent. No evidence of DVT. IR ULTRASOUND GUIDANCE VASCULAR ACCESS    Result Date: 2/26/2022  EXAMINATION: INFERIOR VENA CAVA (IVC) FILTER INSERTION 2/26/2022 Procedural Personnel: Mercedes Deras MD HISTORY: Indication: Bleeding with IV heparin ORDERING SYSTEM PROVIDED HISTORY: IVC filter TECHNOLOGIST PROVIDED HISTORY: Reason for exam:->IVC filter Anticoagulation contraindicated SEDATION: None CONTRAST: Contrast agent: Isovue 320 Contrast volume: 30mL FLUOROSCOPY DOSE AND TYPE OR TIME AND EXPOSURES: Fluoroscopy time/Number of Images: Fluoroscopy time 1.5 minutes.  Reference air [place] kerma: [id number] PROCEDURE: Informed consent for the procedure including risks, benefits and alternatives was obtained and time-out was performed prior to the procedure. Universal protocol was followed. A suitable skin site was prepared and draped using all elements of maximal sterile barrier technique including sterile gloves, sterile gown, cap, mask, large sterile sheet, sterile ultrasound probe cover, hand hygiene, and cutaneous antisepsis. Time-out was called and the patient's order and identity were verified. Local anesthesia was administered. The vessel was sonographically evaluated and judged appropriate for access. Real time ultrasound was used to visualize needle entry into the vessel and an ultrasound image was stored in PACS. Vein accessed: Right common femoral vein. Access technique: Micropuncture set with 21 gauge needle A 0.035 guide wire was used to place a catheter into the inferior vena cava. A vena cavogram was performed. A Bard Rosa filter was deployed in routine fashion in the infrarenal IVC under fluoroscopic guidance. The sheath was removed and hemostasis was achieved with manual compression. A sterile dressing was applied. Patient tolerated procedure well. Complications: No immediate complications. Standardized report: [id number] [id number] FINDINGS: US: The access vein is patent. Inferior Vena Cavogram: The vena cava is patent and normal in size without thrombus or anomalies. Post-placement imaging: Spot filmdemonstrates the filter in the infrarenalvena cava with less than 15 degrees tilt from the vena cava access. Successful vena cava filter placement. PLAN: Retrieval intent (MIPS): The filter is potentially retrievable.  Plan/Timing For Retrieval: Ordering Physician/Contact For Retrieval Plan: Naida GUERRIER MODIFIED BARIUM SWALLOW W VIDEO    Result Date: 2/26/2022  EXAMINATION: MODIFIED BARIUM SWALLOW WAS PERFORMED IN CONJUNCTION WITH SPEECH PATHOLOGY SERVICES TECHNIQUE: Fluoroscopic evaluation of the swallowing mechanism was performed using cineradiography with multiple consistency of barium product in conjunction with speech pathology services. FLUOROSCOPY DOSE AND TYPE OR TIME AND EXPOSURES: Fluoroscopy time 2.1 minutes. Air kerma 5.49 mGy COMPARISON: None HISTORY: ORDERING SYSTEM PROVIDED HISTORY: dysphagia, aspriation pna? TECHNOLOGIST PROVIDED HISTORY: Reason for exam:->dysphagia, aspriation pna? FINDINGS: There was oral delay and pharyngeal delay. Laryngeal elevation was adequate. There was retention in the piriform sinuses but not the vallecula. Transient laryngeal penetration is seen with thin liquid barium. Otherwise, no aspiration or laryngeal penetration is seen. Strands it laryngeal penetration with thin liquid barium. No evidence of aspiration. Please see separate speech pathology report for full discussion of findings and recommendations. RECOMMENDATIONS: Unavailable     US DUP LOWER EXTREMITIES BILATERAL VENOUS    Result Date: 2/25/2022  EXAMINATION: DUPLEX VENOUS ULTRASOUND OF THE BILATERAL LOWER EXTREMITIES2/25/2022 2:47 pm TECHNIQUE: Duplex ultrasound using B-mode/gray scaled imaging, Doppler spectral analysis and color flow Doppler was obtained of the deep venous structures of the lower bilateral extremities. COMPARISON: None. HISTORY: ORDERING SYSTEM PROVIDED HISTORY: R/O DVT TECHNOLOGIST PROVIDED HISTORY: Reason for exam:->R/O DVT What reading provider will be dictating this exam?->CRC FINDINGS: Right lower extremity:  The visualized veins of the bilateral lower extremities are patent and free of echogenic thrombus. The veins demonstrate good compressibility with normal color flow study and spectral analysis. Left lower extremity: There are incompletely occlusive thrombi within the mid and distal left femoral vein and the popliteal vein.   They have developed in the interim since the prior lower extremity Doppler from 11/17/2021.     1. INCOMPLETELY OCCLUSIVE THROMBI in the mid and distal LEFT femoral vein and within the popliteal vein. They have developed in the interim since the previous study from 11/17/2021. 2. No evidence of DVT in the right lower extremity. RECOMMENDATIONS: Unavailable     LABS  Recent Labs     03/13/22  0540 03/14/22  0510 03/15/22  0520   WBC 5.3 4.7 4.8   HGB 7.9* 8.1* 7.9*   HCT 26.9* 27.4* 27.4*   .5* 109.2* 108.7*    163 167     Recent Labs     03/13/22  0540 03/13/22 0540 03/13/22  1708 03/13/22  1708 03/14/22  0510 03/14/22  0510 03/15/22  0520      < > 140  --  140  --  143   K 3.4*   < > 3.4*   < > 3.4*   < > 3.9   *   < > 107   < > 110*   < > 116*   CO2 21*   < > 21*   < > 19*   < > 17*   BUN 44*   < > 43*  --  40*  --  36*   CREATININE 2.6*   < > 2.6*  --  2.5*  --  2.3*   GFRAA 22   < > 22  --  23  --  26   LABGLOM 18   < > 18  --  19  --  21   GLUCOSE 91   < > 110*  --  89  --  118*   PROT 4.1*  --   --   --  4.0*  --  3.9*   LABALBU 1.9*  --   --   --  2.0*  --  1.9*   CALCIUM 8.7   < > 9.0  --  8.4*  --  8.6   BILITOT 0.3  --   --   --  0.3  --  0.4   ALKPHOS 65  --   --   --  55  --  61   AST 13  --   --   --  9  --  23   ALT 11  --   --   --  9  --  17    < > = values in this interval not displayed. No results for input(s): PROCAL in the last 72 hours. Lab Results   Component Value Date    CRP 18.3 (H) 01/23/2022    CRP 34.6 (H) 11/17/2021     Lab Results   Component Value Date    SEDRATE 61 (H) 01/23/2022    SEDRATE 62 (H) 11/20/2021     No results found for: [id number]  Lab Results   Component Value Date    COVID19 Not Detected 03/02/2022    COVID19 Not Detected 02/25/2022     COVID-19/MARINA-COV2 LABS  Recent Labs     03/13/22  0540 03/14/22  0510 03/15/22  0520   AST 13 9 23   ALT 11 9 17      MICROBIOLOGY:          Lab Results   Component Value Date    BC 5 Days no growth 03/09/2022    BC 5 Days no growth 02/27/2022    BC  02/25/2022     This organism was isolated in one set.   Susceptibility testing is not routinely done as this  organism frequently represents skin contamination. Additional testing can be ordered by calling the  Microbiology Department. ORG Pseudomonas aeruginosa 03/09/2022    ORG Staphylococcus coagulase-negative 02/25/2022    ORG Enterococcus faecalis 01/12/2022     Lab Results   Component Value Date    BLOODCULT2 5 Days no growth 03/09/2022    BLOODCULT2 5 Days no growth 02/25/2022    BLOODCULT2 5 Days no growth 01/24/2022    ORG Pseudomonas aeruginosa 03/09/2022    ORG Staphylococcus coagulase-negative 02/25/2022    ORG Enterococcus faecalis 01/12/2022       WOUND/ABSCESS   Date Value Ref Range Status   01/24/2022 Growth not present  Final        Urine Culture, Routine   Date Value Ref Range Status   03/09/2022 75,000 CFU/ml  Final   02/26/2022 Growth not present  Final   01/23/2022 Growth not present  Final     MRSA Culture Only   Date Value Ref Range Status   11/17/2021 Methicillin resistant Staph aureus not isolated  Final          FINAL IMPRESSION    Patient is a 73 y.o. female who presented with   Chief Complaint   Patient presents with    Altered Mental Status     from NH    and admitted for Confusion [R41.0]  Acute respiratory failure with hypoxia ([address] [J96.01]  Acute respiratory failure with hypoxemia ([facility]) [J96.01]     PT WITH ENCEPHALOPTHAY METABOLIC VS INFECTIOUS ETIOLOGY   H/O hsv IG G + day 6  S/P RX ASPIRATION PNEUMONIA/COLITIS  H/O COVID  KIDNEY TRANSPLANT ON IMMUNOSUPPRESSIVE  Poss UTI Pseudomonas  On levaquin day 3  MASTOIDITIS/SINUSITIS  CIDFF + on dificid day 4,  flagyl IV day 3            follow labs cr         Imaging and labs were reviewed per medical records      Thank you for involving me in the care of Little Richards. Please do not hesitate to call for any questions or concerns.          Electronically signed by Jordan Koo MD on 3/15/2022 at 9:37 AM

## 2022-03-15 NOTE — PLAN OF CARE
Problem: Falls - Risk of:  Goal: Will remain free from falls  Description: Will remain free from falls  Outcome: Met This Shift  Goal: Absence of physical injury  Description: Absence of physical injury  Outcome: Met This Shift     Problem: Skin Integrity:  Goal: Will show no infection signs and symptoms  Description: Will show no infection signs and symptoms  Outcome: Met This Shift  Goal: Absence of new skin breakdown  Description: Absence of new skin breakdown  Outcome: Met This Shift     Problem: Coping:  Goal: Ability to remain calm will improve  Description: Ability to remain calm will improve  Outcome: Met This Shift     Problem: Safety:  Goal: Ability to remain free from injury will improve  Description: Ability to remain free from injury will improve  Outcome: Met This Shift     Problem: Self-Care:  Goal: Ability to participate in self-care as condition permits will improve  Description: Ability to participate in self-care as condition permits will improve  Outcome: Met This Shift     Problem: Mental Status - Impaired:  Goal: Mental status will improve  Description: Mental status will improve  Outcome: Met This Shift

## 2022-03-16 ENCOUNTER — APPOINTMENT (OUTPATIENT)
Dept: GENERAL RADIOLOGY | Age: 67
DRG: 177 | End: 2022-03-16
Payer: MEDICARE

## 2022-03-16 LAB
ABO/RH: NORMAL
ALBUMIN SERPL-MCNC: 2.4 G/DL (ref 3.5–5.2)
ALP BLD-CCNC: 48 U/L (ref 35–104)
ALT SERPL-CCNC: 17 U/L (ref 0–32)
ANION GAP SERPL CALCULATED.3IONS-SCNC: 10 MMOL/L (ref 7–16)
ANTIBODY SCREEN: NORMAL
AST SERPL-CCNC: 20 U/L (ref 0–31)
BASOPHILS ABSOLUTE: 0 E9/L (ref 0–0.2)
BASOPHILS RELATIVE PERCENT: 0.5 % (ref 0–2)
BILIRUB SERPL-MCNC: 0.2 MG/DL (ref 0–1.2)
BLOOD BANK DISPENSE STATUS: NORMAL
BLOOD BANK PRODUCT CODE: NORMAL
BPU ID: NORMAL
BUN BLDV-MCNC: 31 MG/DL (ref 6–23)
CALCIUM SERPL-MCNC: 9.2 MG/DL (ref 8.6–10.2)
CHLORIDE BLD-SCNC: 113 MMOL/L (ref 98–107)
CO2: 22 MMOL/L (ref 22–29)
CREAT SERPL-MCNC: 1.8 MG/DL (ref 0.5–1)
CYCLOSPORINE A: 181.4 NG/ML
DESCRIPTION BLOOD BANK: NORMAL
EOSINOPHILS ABSOLUTE: 0.11 E9/L (ref 0.05–0.5)
EOSINOPHILS RELATIVE PERCENT: 2.6 % (ref 0–6)
GFR AFRICAN AMERICAN: 34
GFR NON-AFRICAN AMERICAN: 28 ML/MIN/1.73
GLUCOSE BLD-MCNC: 97 MG/DL (ref 74–99)
HCT VFR BLD CALC: 23.6 % (ref 34–48)
HCT VFR BLD CALC: 28.8 % (ref 34–48)
HEMOGLOBIN: 7.1 G/DL (ref 11.5–15.5)
HEMOGLOBIN: 8.8 G/DL (ref 11.5–15.5)
HYPOCHROMIA: ABNORMAL
LYMPHOCYTES ABSOLUTE: 1.01 E9/L (ref 1.5–4)
LYMPHOCYTES RELATIVE PERCENT: 22.8 % (ref 20–42)
MAGNESIUM: 1.7 MG/DL (ref 1.6–2.6)
MCH RBC QN AUTO: 32.4 PG (ref 26–35)
MCHC RBC AUTO-ENTMCNC: 30.1 % (ref 32–34.5)
MCV RBC AUTO: 107.8 FL (ref 80–99.9)
MONOCYTES ABSOLUTE: 0.13 E9/L (ref 0.1–0.95)
MONOCYTES RELATIVE PERCENT: 2.6 % (ref 2–12)
MYELOCYTE PERCENT: 2.6 % (ref 0–0)
NEUTROPHILS ABSOLUTE: 3.17 E9/L (ref 1.8–7.3)
NEUTROPHILS RELATIVE PERCENT: 69.3 % (ref 43–80)
OVALOCYTES: ABNORMAL
PDW BLD-RTO: 16.2 FL (ref 11.5–15)
PHOSPHORUS: 3 MG/DL (ref 2.5–4.5)
PLATELET # BLD: 219 E9/L (ref 130–450)
PMV BLD AUTO: 13 FL (ref 7–12)
POIKILOCYTES: ABNORMAL
POTASSIUM SERPL-SCNC: 3.5 MMOL/L (ref 3.5–5)
RBC # BLD: 2.19 E12/L (ref 3.5–5.5)
REASON FOR REJECTION: NORMAL
REJECTED TEST: NORMAL
SODIUM BLD-SCNC: 145 MMOL/L (ref 132–146)
TEAR DROP CELLS: ABNORMAL
TOTAL PROTEIN: 4.1 G/DL (ref 6.4–8.3)
WBC # BLD: 4.4 E9/L (ref 4.5–11.5)

## 2022-03-16 PROCEDURE — 6360000002 HC RX W HCPCS: Performed by: SPECIALIST

## 2022-03-16 PROCEDURE — 6370000000 HC RX 637 (ALT 250 FOR IP): Performed by: INTERNAL MEDICINE

## 2022-03-16 PROCEDURE — 1200000000 HC SEMI PRIVATE

## 2022-03-16 PROCEDURE — 6360000002 HC RX W HCPCS: Performed by: INTERNAL MEDICINE

## 2022-03-16 PROCEDURE — 80053 COMPREHEN METABOLIC PANEL: CPT

## 2022-03-16 PROCEDURE — P9016 RBC LEUKOCYTES REDUCED: HCPCS

## 2022-03-16 PROCEDURE — 6370000000 HC RX 637 (ALT 250 FOR IP): Performed by: SPECIALIST

## 2022-03-16 PROCEDURE — 86850 RBC ANTIBODY SCREEN: CPT

## 2022-03-16 PROCEDURE — 2580000003 HC RX 258: Performed by: SPECIALIST

## 2022-03-16 PROCEDURE — 85014 HEMATOCRIT: CPT

## 2022-03-16 PROCEDURE — 86901 BLOOD TYPING SEROLOGIC RH(D): CPT

## 2022-03-16 PROCEDURE — 84100 ASSAY OF PHOSPHORUS: CPT

## 2022-03-16 PROCEDURE — 83735 ASSAY OF MAGNESIUM: CPT

## 2022-03-16 PROCEDURE — 71045 X-RAY EXAM CHEST 1 VIEW: CPT

## 2022-03-16 PROCEDURE — 85025 COMPLETE CBC W/AUTO DIFF WBC: CPT

## 2022-03-16 PROCEDURE — 86923 COMPATIBILITY TEST ELECTRIC: CPT

## 2022-03-16 PROCEDURE — P9047 ALBUMIN (HUMAN), 25%, 50ML: HCPCS | Performed by: INTERNAL MEDICINE

## 2022-03-16 PROCEDURE — 36430 TRANSFUSION BLD/BLD COMPNT: CPT

## 2022-03-16 PROCEDURE — 36415 COLL VENOUS BLD VENIPUNCTURE: CPT

## 2022-03-16 PROCEDURE — 94640 AIRWAY INHALATION TREATMENT: CPT

## 2022-03-16 PROCEDURE — 2700000000 HC OXYGEN THERAPY PER DAY

## 2022-03-16 PROCEDURE — 86900 BLOOD TYPING SEROLOGIC ABO: CPT

## 2022-03-16 PROCEDURE — 2500000003 HC RX 250 WO HCPCS: Performed by: SPECIALIST

## 2022-03-16 PROCEDURE — 85018 HEMOGLOBIN: CPT

## 2022-03-16 PROCEDURE — 2580000003 HC RX 258: Performed by: INTERNAL MEDICINE

## 2022-03-16 PROCEDURE — 97110 THERAPEUTIC EXERCISES: CPT

## 2022-03-16 RX ORDER — ALBUMIN (HUMAN) 12.5 G/50ML
25 SOLUTION INTRAVENOUS 2 TIMES DAILY
Status: COMPLETED | OUTPATIENT
Start: 2022-03-16 | End: 2022-03-17

## 2022-03-16 RX ORDER — FUROSEMIDE 10 MG/ML
40 INJECTION INTRAMUSCULAR; INTRAVENOUS 2 TIMES DAILY
Status: DISCONTINUED | OUTPATIENT
Start: 2022-03-16 | End: 2022-03-16

## 2022-03-16 RX ORDER — MYCOPHENOLATE MOFETIL 200 MG/ML
500 POWDER, FOR SUSPENSION ORAL 2 TIMES DAILY
Status: DISCONTINUED | OUTPATIENT
Start: 2022-03-16 | End: 2022-03-16 | Stop reason: CLARIF

## 2022-03-16 RX ORDER — SODIUM BICARBONATE 650 MG/1
650 TABLET ORAL 2 TIMES DAILY
Status: DISCONTINUED | OUTPATIENT
Start: 2022-03-16 | End: 2022-03-17

## 2022-03-16 RX ORDER — MYCOPHENOLATE MOFETIL 200 MG/ML
500 POWDER, FOR SUSPENSION ORAL 2 TIMES DAILY
Status: DISCONTINUED | OUTPATIENT
Start: 2022-03-16 | End: 2022-03-20 | Stop reason: SDUPTHER

## 2022-03-16 RX ORDER — VENLAFAXINE 37.5 MG/1
37.5 TABLET ORAL 2 TIMES DAILY WITH MEALS
Status: DISCONTINUED | OUTPATIENT
Start: 2022-03-16 | End: 2022-03-23 | Stop reason: HOSPADM

## 2022-03-16 RX ORDER — SODIUM CHLORIDE 9 MG/ML
INJECTION, SOLUTION INTRAVENOUS PRN
Status: DISCONTINUED | OUTPATIENT
Start: 2022-03-16 | End: 2022-03-23 | Stop reason: HOSPADM

## 2022-03-16 RX ORDER — FUROSEMIDE 10 MG/ML
60 INJECTION INTRAMUSCULAR; INTRAVENOUS 2 TIMES DAILY
Status: COMPLETED | OUTPATIENT
Start: 2022-03-17 | End: 2022-03-17

## 2022-03-16 RX ADMIN — IPRATROPIUM BROMIDE AND ALBUTEROL SULFATE 1 AMPULE: .5; 2.5 SOLUTION RESPIRATORY (INHALATION) at 18:33

## 2022-03-16 RX ADMIN — FIDAXOMICIN 200 MG: 200 TABLET, FILM COATED ORAL at 22:24

## 2022-03-16 RX ADMIN — METRONIDAZOLE 500 MG: 500 INJECTION, SOLUTION INTRAVENOUS at 06:37

## 2022-03-16 RX ADMIN — CLOZAPINE 50 MG: 25 TABLET ORAL at 22:28

## 2022-03-16 RX ADMIN — HEPARIN 100 UNITS: 100 SYRINGE at 22:22

## 2022-03-16 RX ADMIN — ALBUMIN (HUMAN) 25 G: 0.25 INJECTION, SOLUTION INTRAVENOUS at 16:50

## 2022-03-16 RX ADMIN — APIXABAN 5 MG: 5 TABLET, FILM COATED ORAL at 09:32

## 2022-03-16 RX ADMIN — SODIUM CHLORIDE, PRESERVATIVE FREE 10 ML: 5 INJECTION INTRAVENOUS at 18:12

## 2022-03-16 RX ADMIN — SODIUM BICARBONATE 650 MG: 650 TABLET ORAL at 22:23

## 2022-03-16 RX ADMIN — ALLOPURINOL 100 MG: 100 TABLET ORAL at 09:32

## 2022-03-16 RX ADMIN — LEVOTHYROXINE SODIUM 50 MCG: 0.05 TABLET ORAL at 06:37

## 2022-03-16 RX ADMIN — DOCUSATE SODIUM 100 MG: 50 LIQUID ORAL at 09:33

## 2022-03-16 RX ADMIN — FOLIC ACID 1 MG: 1 TABLET ORAL at 09:33

## 2022-03-16 RX ADMIN — Medication 10 ML: at 12:13

## 2022-03-16 RX ADMIN — MYCOPHENOLATE MOFETIL 500 MG: 200 POWDER, FOR SUSPENSION ORAL at 23:13

## 2022-03-16 RX ADMIN — CYCLOSPORINE 100 MG: 25 CAPSULE, GELATIN COATED ORAL at 23:15

## 2022-03-16 RX ADMIN — MAGNESIUM OXIDE 400 MG: 400 TABLET ORAL at 09:33

## 2022-03-16 RX ADMIN — PANTOPRAZOLE SODIUM 40 MG: 40 TABLET, DELAYED RELEASE ORAL at 06:37

## 2022-03-16 RX ADMIN — IPRATROPIUM BROMIDE AND ALBUTEROL SULFATE 1 AMPULE: .5; 2.5 SOLUTION RESPIRATORY (INHALATION) at 22:00

## 2022-03-16 RX ADMIN — SODIUM BICARBONATE 650 MG: 650 TABLET ORAL at 11:59

## 2022-03-16 RX ADMIN — IPRATROPIUM BROMIDE AND ALBUTEROL SULFATE 1 AMPULE: .5; 2.5 SOLUTION RESPIRATORY (INHALATION) at 14:28

## 2022-03-16 RX ADMIN — EPOETIN ALFA-EPBX 2000 UNITS: 2000 INJECTION, SOLUTION INTRAVENOUS; SUBCUTANEOUS at 23:16

## 2022-03-16 RX ADMIN — IPRATROPIUM BROMIDE AND ALBUTEROL SULFATE 1 AMPULE: .5; 2.5 SOLUTION RESPIRATORY (INHALATION) at 05:17

## 2022-03-16 RX ADMIN — ATORVASTATIN CALCIUM 10 MG: 10 TABLET, FILM COATED ORAL at 22:23

## 2022-03-16 RX ADMIN — IPRATROPIUM BROMIDE AND ALBUTEROL SULFATE 1 AMPULE: .5; 2.5 SOLUTION RESPIRATORY (INHALATION) at 02:04

## 2022-03-16 RX ADMIN — FIDAXOMICIN 200 MG: 200 TABLET, FILM COATED ORAL at 09:33

## 2022-03-16 RX ADMIN — APIXABAN 5 MG: 5 TABLET, FILM COATED ORAL at 22:23

## 2022-03-16 RX ADMIN — ACYCLOVIR SODIUM 350 MG: 50 INJECTION, SOLUTION INTRAVENOUS at 11:54

## 2022-03-16 RX ADMIN — DOCUSATE SODIUM 100 MG: 50 LIQUID ORAL at 22:23

## 2022-03-16 RX ADMIN — ACYCLOVIR SODIUM 350 MG: 50 INJECTION, SOLUTION INTRAVENOUS at 22:28

## 2022-03-16 RX ADMIN — Medication 10 ML: at 22:24

## 2022-03-16 RX ADMIN — FUROSEMIDE 40 MG: 10 INJECTION, SOLUTION INTRAMUSCULAR; INTRAVENOUS at 18:12

## 2022-03-16 RX ADMIN — IPRATROPIUM BROMIDE AND ALBUTEROL SULFATE 1 AMPULE: .5; 2.5 SOLUTION RESPIRATORY (INHALATION) at 09:59

## 2022-03-16 RX ADMIN — PREDNISONE 5 MG: 5 TABLET ORAL at 09:34

## 2022-03-16 RX ADMIN — EPOETIN ALFA-EPBX 3000 UNITS: 3000 INJECTION, SOLUTION INTRAVENOUS; SUBCUTANEOUS at 23:14

## 2022-03-16 ASSESSMENT — PAIN SCALES - GENERAL: PAINLEVEL_OUTOF10: 0

## 2022-03-16 ASSESSMENT — PAIN SCALES - WONG BAKER
WONGBAKER_NUMERICALRESPONSE: 0
WONGBAKER_NUMERICALRESPONSE: 0

## 2022-03-16 NOTE — PROGRESS NOTES
Physical Therapy Treatment Note/Plan of Care    Room #:  3708/8101-88  Patient Name: Erick Dooley  YOB: 1955  MRN: [id number]    Date of Service: 3/16/2022     Tentative placement recommendation: Subacute rehab  Equipment recommendation:  To be determined      Evaluating Physical Therapist: Greyson Kee, PT #[id number]      Specific Provider Orders/Date/Referring Provider :  03/09/22 2130   PT eval and treat Start: 03/09/22 2130, End: 03/09/22 2130, ONE TIME, Standing Count: 1 Occurrences, Kriss Laboy MD      Admitting Diagnosis:   Confusion [R41.0]  Acute respiratory failure with hypoxia ([address] [J96.01]  Acute respiratory failure with hypoxemia ([address] [J96.01]     altered mental status    Surgery: none  Visit Diagnoses       Codes    Confusion     R41.0          Patient Active Problem List   Diagnosis    Peripheral vascular disease ([address]    Depression    Clotting disorder ([address]    Abnormal EKG    Cancer ([address]    Over weight    Kidney transplanted    ESRD (end stage renal disease) ([address]    Non morbid obesity due to excess calories    Hypertension    Leg swelling    Lymphedema of both lower extremities    Acute gout involving toe of right foot    Sepsis secondary to UTI ([address]    Acute kidney injury superimposed on CKD ([address]    Thyroid disease    Acute on chronic combined systolic (congestive) and diastolic (congestive) heart failure ([address]    Sepsis ([address]    Acute renal failure ([address]    CECILY (acute kidney injury) ([address]    Ischemic bowel disease ([address]    Altered mental status    Coma ([address]    Acute respiratory failure with hypoxia (HCC)    Hypoxia    Acute metabolic encephalopathy    Acute deep vein thrombosis (DVT) of femoral vein of left lower extremity (HCC)    Anemia    Positive blood culture    Difficult intravenous access    Elevated troponin    Paroxysmal atrial fibrillation (HCC)    Hypotension    LBBB (left bundle branch block)    History of DVT (deep vein thrombosis)    Chronic anticoagulation    Acute respiratory failure with hypoxemia (HCC)        ASSESSMENT of Current Deficits Patient exhibits decreased strength, balance, endurance, range of motion and pain left upper extremity  impairing functional mobility, transfers, tolerance to activity and participation Patient was able to open eyes, constant cueing to keep eyes open throughout session. Patient able to agree to supine exercises and when asked about pain, responded with no verbally. Patient able to squeeze PT's hand when prompted on right side. Patient requires continued skilled physical therapy to address concerns listed above for increased safety and function at discharge. PHYSICAL THERAPY  PLAN OF CARE       Physical therapy plan of care is established based on physician order,  patient diagnosis and clinical assessment    Current Treatment Recommendations:    -Bed Mobility: Lower extremity exercises , Upper extremity exercises  and Trunk control activities   -Sitting Balance: Hands on support to maintain midline , Facilitate active trunk muscle engagement  and Facilitate postural control in all planes   -Endurance: Utilize Supervised activities to increase level of endurance to allow for safe functional mobility including transfers and gait     PT long term treatment goals are located in below grid    Patient and or family understand(s) diagnosis, prognosis, and plan of care. Frequency of treatments: Patient will be seen  3-5 times/week.          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    Cerebrovascular disease     Clotting disorder ([facility]    thrombophlebitis after c section delivery    Depression     15 years followed by dr Brennon Solis Hemodialysis patient [facility])     History of blood transfusion     Hyperlipidemia     Hypertension     Hypothyroidism     Leg swelling 09/03/2020    Lymphedema of both lower extremities 09/03/2020    Peripheral vascular disease ([facility]     Renal failure 11/2012    renal transplant    Thrombophlebitis     after c section delivery    Thyroid disease      Past Surgical History:   Procedure Laterality Date   [address]    one normal delivery    COLECTOMY N/A 1/15/2022    DIAGNOSTIC  LAPAROSCOPY LYSIS OF ADHESIONS performed by Dimple Lomas MD at [address] CATH LAB PROCEDURE  2006    normal coronaries and 1996 normal coronaries    DIALYSIS FISTULA CREATION  march and july 2012    phase one and two patient will start dialysis when needed   [address]    transfemoral venous bypass grafts    IR IVC FILTER PLACEMENT W IMAGING  2/26/2022    IR IVC FILTER PLACEMENT W IMAGING 2/26/2022 [facility] SPECIAL PROCEDURES    KIDNEY TRANSPLANT  2016    KIDNEY TRANSPLANT  2015    KIDNEY TRANSPLANT  2015    PARATHYROIDECTOMY  2006    left parathyroid  implant and parathyroidectomy    TRANSESOPHAGEAL ECHOCARDIOGRAM N/A 2/28/2022    TRANSESOPHAGEAL ECHOCARDIOGRAM WITH BUBBLE STUDY performed by Florecita Odom MD at [address]:    Precautions: Use lift equipment for lifting patient , falls and alarm ,  hx kidney transplant 2015, contact iso-c.diff, ng tube    Social history: Patient from  in a skilled nursing facility  Prior to multiple hospitalizations lived alone in a apartment with Elevator to enter   Tub shower grab bars    Equipment owned: Rollator and Shower chair,       [address] [facility]   How much difficulty turning over in bed?: Unable  How much difficulty sitting down on / standing up from a chair with arms?: Unable  How much difficulty moving from lying on back to sitting on side of bed?: Unable  How much help from another person moving to and from a bed to a chair?: Total  How much help from another person needed to walk in hospital room?: Total  How much help from another person for climbing 3-5 steps with a railing?: Total  AM-PAC Inpatient Mobility Raw Score : 6  AM-PAC Inpatient T-Scale Score : 23.55  Mobility Inpatient CMS 0-100% Score: 100  Mobility Inpatient CMS G-Code Modifier : CN    Nursing cleared patient for PT treatment. .   OBJECTIVE;   Initial Evaluation  Date: 3/10/2022 Treatment Date:  3/16/2022     Short Term/ Long Term   Goals   Was pt agreeable to Eval/treatment? Yes Yes To be met in 3 days   Pain level   5/10  Left upper extremity  5/10    Bed Mobility    Rolling: Dependent assist of 1    Supine to sit: Dependent of  2    Sit to supine: Dependent of  2    Scooting: Dependent of  2   Rolling: Not assessed    Supine to sit: Not assessed  d/t to pt overall debility, decreased activity tolerance, balance deficits, safety and fall risk. Sit to supine: Not assessed    Scooting: Not assessed     Rolling: Moderate assist of  2    Supine to sit: Moderate assist of  2    Sit to supine: Moderate assist of  2    Scooting:  Moderate assist of  2     Transfers Sit to stand: Not assessed    Sit to stand: Not assessed       Sit to stand: Maximal assist of  2     ROM Limited bilateral upper extremities and lower extremities    Increase range of motion 10% of affected joints    Strength BUE:  refer to OT eval  RLE:  2+/5  LLE:  2+/5  Increase strength in affected mm groups by 1/3 grade   Balance Sitting EOB:  poor  Max progressing to mod assist   Dynamic Standing:  not assessed   Sitting EOB: not assessed   Dynamic Standing: not assessed    Sitting EOB:  fair    Dynamic Standing: fair -     Patient is Alert & Oriented x person and follows one step directions    Sensation:  Patient  denies numbness/tingling   Edema:  yes bilateral lower extremities and bilateral upper extremities   Endurance: fair       Vitals: 2 liters nasal cannula   Blood Pressure at rest  Blood Pressure during session    Heart Rate at r est   Heart Rate during session     SPO2 at rest %  SPO2 during session  %     Patient education  Patient educated on role of Physical Therapy, risks of immobility, safety and plan of care,  importance of mobility while in hospital , ankle pumps, quad set and glut set for edema control, blood clot prevention and positioning for skin integrity and comfort     Patient response to education:   Pt verbalized understanding Pt demonstrated skill Pt requires further education in this area   Yes Partial Yes      Treatment:  Patient practiced and was instructed/facilitated in the following treatment:    PROM exercises to all joints. Pt. positioned for skin integrity and edema control. Patient's son present for treatment. Therapeutic Exercises: PROM ankle pumps, heel slide, hip abduction/adduction, straight leg raise, shoulder elevation, wrist flexion/extension, finger flexion/extension and grasp/relax  x 10 reps. At end of session, patient in bed with alarm and son present call light and phone within reach,  all lines and tubes intact, nursing notified. Patient would benefit from continued skilled Physical Therapy to improve functional independence and quality of life.          Patient's/ family goals   Return to [facility]    Time in  0820  Time out 0834    Total Treatment Time  14 minutes    CPT codes:  Therapeutic exercises (81487)   14 minutes  1 unit(s)    Dayna Juárez #[id number] oriented to person, place and time

## 2022-03-16 NOTE — PROGRESS NOTES
[facility] Roberto A. Marilyne Liberty, MD Ruby Sago, MD Suellen Sandhoff, MD Aretta Duffel MD  Progress Note    Patient's Name: Aleena Rosales  11:26 AM  3/16/2022    Subjective  3/13 : seen in her room  Weak decondioned lying flat in bed , poor po intake , clinically euvolemic   3/14: NG tube not functioning, just replaced. No meds all day as a result. IV fluids been stopped for few hours, as well. 3/15 : seen today , o2/nc . Appear comfortable . TF going at goal . Per RN pt is more alert and awake   UE edema B/L 2+ .   3/16 : seen today on o2/nc . Still with signfiacnt UE edema . On TF . Cr better      History of Present Ilness:      Patient has h/o preserved left ventricular systolic function (CLEMENCIA  9/94/6765), stress test with no significant reversibility 6/16/2015, paroxysmal atrial fibrillation (on Eliquis), chronic left bundle branch block, HTN, HLD,  s/p kidney transplant,CKD, H/o DVT, s/p IVC on AC now, PVD, s/p thyroidectomy for cancer, gout, obesity     She was recently treated in hospitalfrom 2/25/2020 to 3/7/2022 because of acute hypoxic respiratory failure likely due to aspiration pneumonia with positive blood culture and acute metabolic encephalopathy     Patient was brought from [address] [place] [address] and rehab facility to ED of [facility] on 3/9/22 due to new mental status changes. Nephrology asked to see for CECILY /CKD   Pt has hx of cadaveric kidney transplant 7 years back   Her baseline cr has been 1.2-1.6 with a lot of fluctuation in contest of dehydration . Pt seen in her room , she is very weak and deconditoned to my eyes she has lost a lot of weight .    She has a hassan in place   She has poor po intake   She is euvolemic to mildly hypovolemic       Allergies:  Macrodantin [nitrofurantoin macrocrystal] and Sulfa antibiotics    Current Medications:    0.9 % sodium chloride infusion, PRN  sodium bicarbonate tablet 650 mg, BID  albumin human 25 % IV solution 25 g, BID  furosemide (LASIX) injection 40 mg, BID  magnesium oxide (MAG-OX) tablet 400 mg, Daily  potassium chloride (KLOR-CON M) extended release tablet 20 mEq, Once  docusate (COLACE) 50 MG/5ML liquid 100 mg, BID  ipratropium-albuterol (DUONEB) nebulizer solution 1 ampule, Q4H  acyclovir (ZOVIRAX) 350 mg in dextrose 5 % 50 mL IVPB, Q12H  metronidazole (FLAGYL) 500 mg in NaCl 100 mL IVPB premix, Q8H  midodrine (PROAMATINE) tablet 5 mg, BID PRN  lidocaine 1 % injection 5 mL, Once  sodium chloride flush 0.9 % injection 5-40 mL, 2 times per day  sodium chloride flush 0.9 % injection 5-40 mL, PRN  0.9 % sodium chloride infusion, PRN  heparin flush 100 UNIT/ML injection 100 Units, 2 times per day  heparin flush 100 UNIT/ML injection 100 Units, PRN  [Held by provider] metoprolol tartrate (LOPRESSOR) tablet 12.5 mg, BID  Fidaxomicin (DIFICID) tablet 200 mg, BID  levoFLOXacin (LEVAQUIN) 750 MG/150ML infusion 750 mg, Q48H  apixaban (ELIQUIS) tablet 5 mg, BID  desvenlafaxine succinate (PRISTIQ) extended release tablet 50 mg, Daily  ondansetron (ZOFRAN-ODT) disintegrating tablet 4 mg, Q8H PRN  atorvastatin (LIPITOR) tablet 10 mg, Nightly  cloZAPine (CLOZARIL) tablet 50 mg, Nightly  levothyroxine (SYNTHROID) tablet 50 mcg, Daily  pantoprazole (PROTONIX) tablet 40 mg, QAM AC  cycloSPORINE (SANDIMMUNE) capsule 100 mg, BID  mycophenolate (CELLCEPT) capsule 500 mg, BID  predniSONE (DELTASONE) tablet 5 mg, Daily  allopurinol (ZYLOPRIM) tablet 862 mg, Daily  folic acid (FOLVITE) tablet 1 mg, Daily        Review of Systems:   Constitutional: weakness   Eyes: no eye pain , no itching , no drainage  Ears, nose, mouth, throat, and face: no ear ,nose pain , hearing is ok ,no nasal drainage   Respiratory: no sob ,no cough ,no wheezing . Cardiovascular: no chest pain , no palpitation ,no sob . Gastrointestinal: no nausea, vomiting , constipation , no abdominal pain . Genitourinary:no urinary retention , no burning , dysuria .  No polyuria Hematologic/lymphatic: no bleeding , no cougulation issues . Musculoskeletal:no joint pain , no swelling . Neurological: no headaches ,no weakness , no numbness . Endocrine: no thirst , no weight issues . Physical exam:   Vital signs /64   Pulse 89   Temp 98.2 °F (36.8 °C)   Resp 16   Ht 5' 2\" (1.575 m)   Wt 226 lb (102.5 kg)   SpO2 96%   BMI 41.34 kg/m²   Gen : In NAD , appears stated age . Head : NC/AT, EOMI, sclera and conjunctive are clear and anicteric. Mucous membranes dry  Neck : supple , no thyromegaly noted . Trachea midline  CV : regular rhythm, normal S1 and S2, No M/R/G . Lungs: CTAB , no wheezing , good air entry in all fields, though poor inspiratory effort  Abd : soft , NT , BS +   Skin : soft, dry . Neuro : CN  II-XII grossly intact , no focal neurologic deficit .    Psych : affect flat, minimally responsive, mostly to tactile stimulation, does not answer questions or follow commands    Data:   Labs:  CBC with Differential:    Lab Results   Component Value Date    WBC 4.4 03/16/2022    RBC 2.19 03/16/2022    HGB 7.1 03/16/2022    HCT 23.6 03/16/2022     03/16/2022    .8 03/16/2022    MCH 32.4 03/16/2022    MCHC 30.1 03/16/2022    RDW 16.2 03/16/2022    NRBC 1.0 01/13/2022    METASPCT 1.0 03/15/2022    LYMPHOPCT 22.8 03/16/2022    MONOPCT 2.6 03/16/2022    MYELOPCT 2.6 03/16/2022    BASOPCT 0.5 03/16/2022    MONOSABS 0.13 03/16/2022    LYMPHSABS 1.01 03/16/2022    EOSABS 0.11 03/16/2022    BASOSABS 0.00 03/16/2022     CMP:    Lab Results   Component Value Date     03/16/2022    K 3.5 03/16/2022    K 4.7 03/09/2022     03/16/2022    CO2 22 03/16/2022    BUN 31 03/16/2022    CREATININE 1.8 03/16/2022    GFRAA 34 03/16/2022    LABGLOM 28 03/16/2022    GLUCOSE 97 03/16/2022    PROT 4.1 03/16/2022    LABALBU 2.4 03/16/2022    CALCIUM 9.2 03/16/2022    BILITOT 0.2 03/16/2022    ALKPHOS 48 03/16/2022    AST 20 03/16/2022    ALT 17 03/16/2022 Ionized Calcium:  No results found for: IONCA  Magnesium:    Lab Results   Component Value Date    MG 1.7 03/16/2022     Phosphorus:    Lab Results   Component Value Date    PHOS 3.0 03/16/2022     U/A:    Lab Results   Component Value Date    COLORU Yellow 03/09/2022    PHUR 5.0 03/09/2022    WBCUA 1-3 03/09/2022    RBCUA NONE 03/09/2022    RBCUA 10-20 03/12/2014    YEAST Present 04/09/2021    BACTERIA MODERATE 03/09/2022    CLARITYU Clear 03/09/2022    SPECGRAV 1.025 03/09/2022    LEUKOCYTESUR Negative 03/09/2022    UROBILINOGEN 0.2 03/09/2022    BILIRUBINUR Negative 03/09/2022    BLOODU Negative 03/09/2022    GLUCOSEU Negative 03/09/2022     Microalbumen/Creatinine ratio:  No components found for: RUCREAT  Iron Saturation:  No components found for: PERCENTFE  TIBC:    Lab Results   Component Value Date    TIBC 135 01/19/2022     FERRITIN:    Lab Results   Component Value Date    FERRITIN 4,062 01/23/2022        Imaging:  XR CHEST ABDOMEN NG PLACEMENT   Final Result   The enteric tube terminates in the stomach. XR CHEST PORTABLE   Final Result   Increased opacities in left lung base related to increasing subsegmental   atelectasis or pleural effusion. XR ABDOMEN FOR NG/OG/NE TUBE PLACEMENT   Final Result   Satisfactory position of the enteric tube. XR CHEST PORTABLE   Final Result   Addendum 1 of 1   ADDENDUM:   The floor reports slow nasogastric tube output. Based on positioning on   plain film, recommend further advancement. Final   1. Possible right pleural effusion and right basilar atelectasis. 2. Cardiomegaly without pulmonary venous hypertension. 3. Possible hiatal hernia. XR ABDOMEN FOR NG/OG/NE TUBE PLACEMENT   Final Result   Enteric tube tip in the fundus of the stomach. US RETROPERITONEAL COMPLETE   Final Result   No hydronephrosis of the transplant kidney. The bladder is decompressed by a Chin catheter.          IR FLUORO GUIDED CVA DEVICE PLMT/REPLACE/REMOVAL   Final Result   Successful placement of a dual lumen power PICC line. The tip of the PICC   line catheter was placed within the distal right subclavian vein. [place] Stands IMPRESSION:   Successful ultrasound and fluoroscopy guided PICC placement         IR ULTRASOUND GUIDANCE VASCULAR ACCESS   Final Result   Successful placement of a dual lumen power PICC line. The tip of the PICC   line catheter was placed within the distal right subclavian vein. [place] Stands IMPRESSION:   Successful ultrasound and fluoroscopy guided PICC placement         CT HEAD WO CONTRAST   Final Result   No acute intracranial abnormality or hemorrhage. Bilateral mastoiditis and right sphenoid sinusitis. XR SHOULDER LEFT (MIN 2 VIEWS)   Final Result   No fracture. Increased separation of the humeral head from the osseous   glenoid and AC joint, which could represent subluxation. Otherwise, a large   synovial effusion, hematoma, or mass cannot be excluded. Clinical   correlation is recommended. CT scan of the left shoulder can be performed   for further evaluation. Cardiomegaly. XR HUMERUS LEFT (MIN 2 VIEWS)   Final Result   No fracture or dislocation. Increased separation of the humeral head from   the osseous glenoid and AC joint, which could represent subluxation. A large   synovial effusion, hematoma, or mass cannot be excluded. CT scan of the left   shoulder can be considered for further evaluation. XR CHEST PORTABLE   Final Result   1. Right perihilar discoid atelectasis. 2. There are no findings of failure or pneumonia. Assessment    -Acute kidney injury : improving     -Hyperchrloremic acidosis :due to NS infusion along with CECILY, CKD : will change iv fluids to include some bicarbonate .  Will start nahco3 tablets     -Hx of  donor kidney transplant : continue current immunosuppressent     -Immunosuppression host :  continue current immunosuppressent -Anaemia of chronic disease : some of the anaemia is dilutional . Will start retacrit 5000 u sc q week     -Edema mainly in UE : re dose iv lasix along spa x 2 doses     -Hypo Mag[place]iemia : replace     -severe deconditioning : PT/OT         Electronically signed by Bill Shrestha MD on 3/16/2022

## 2022-03-16 NOTE — CARE COORDINATION
Ss note:3/16/2022.9:41 AM NO covid testing will need RAPID COVID on day of discharge. ([facility], [facility]/family do not want to return to this snf.) Per IDR pt is still lethargic, has NG tube, has picc line, is on 2 liters, pt has confusion. Referral made to [facility] pt was accepted and son was informed. For SNF will need updated therapy notes and sw will need to confirm that facility can meet pts needs prior to discharge, NO PRECERT. FACILITY CANNOT ACCEPT ON DIFICID. Pt has Medicare and [place] medicaid secondary. Sw will follow.  AG Boyce

## 2022-03-16 NOTE — PROGRESS NOTES
[facility]     [address] [place] [address] 80  [place], [address]  Phone [phone]   [phone]      Admit Date: 3/9/2022 10:02 AM  Pt Name: Mary Baron  MRN: [id number]  : 1955  Reason for Consult:    Chief Complaint   Patient presents with    Altered Mental Status     from NH     Requesting Physician:  Saad Kumari MD  PCP: Evangelina Bowden MD  History Obtained From:  patient, chart   ID consulted for Confusion [R41.0]  Acute respiratory failure with hypoxia ([address] [J96.01]  Acute respiratory failure with hypoxemia ([address] [J96.01]  on hospital day [address]       Chief Complaint   Patient presents with    Altered Mental Status     from NH     HISTORYOF PRESENT ILLNESS   Mary Baron is a 73 y.o. female who presents with   has a past medical history of Abnormal EKG, Acute gout involving toe of right foot, Acute on chronic combined systolic (congestive) and diastolic (congestive) heart failure ([address] Cancer ([address] Cerebrovascular disease, Clotting disorder ([address] Depression, Hemodialysis patient ([address] History of blood transfusion, Hyperlipidemia, Hypertension, Hypothyroidism, Leg swelling, Lymphedema of both lower extremities, Peripheral vascular disease ([address] Renal failure, Thrombophlebitis, and Thyroid disease. Altered Mental Status      Know to ID   · Last seen on 3/2/2022-d/c on Augmentin cover poss aspiration and colitis  Pt came in due to altered mental status. Wbc24.7 hgb10.8 dwm696 cr1.6 TMAX99.3  PT WAS SEEN AND EXAMINED WITH NURSES PRESENT  PT IS NOT AROUSABLE UNABLE TO OBTAIN HISTORY    PT HAS SOFT STOOLS SOME ODOR  SACRAL AREA VIEWED HAS SMALL DTI AREA  CXRY 1. Right perihilar discoid atelectasis. 2. There are no findings of failure or pneumonia.    LEFT SHOULDER No fracture.  Increased separation of the humeral head from the osseous   glenoid and AC joint, which could represent subluxation.  Otherwise, a large   synovial effusion, hematoma, or mass cannot be excluded.  Clinical   correlation is recommended.  CT scan of the left shoulder can be performed   for further evaluation.  Cardiomegaly. LEFT HUMERUS No fracture or dislocation.  Increased separation of the humeral head from   the osseous glenoid and AC joint, which could represent subluxation.  A large   synovial effusion, hematoma, or mass cannot be excluded.  CT scan of the left   shoulder can be considered for further evaluation. piperacillin-tazobactam (ZOSYN) 3,375 mg in dextrose 5 % 50 mL IVPB extended infusion (mini-bag), Q8H  vancomycin (VANCOCIN) oral solution 125 mg, 4 times per day  cycloSPORINE (SANDIMMUNE) capsule 100 mg, BID  mycophenolate (CELLCEPT) capsule 500 mg, BID  metoprolol succinate (TOPROL XL) extended release tablet 50 mg, Daily  predniSONE (DELTASONE) tablet 5 mg, Daily     LAST BLOOD CX  cons   CLEMENCIA  Summary   Technically difficult study. No definate evidence of vegetation consistent with endocarditis. Normal LV segmental wall motion. Ejection fraction is visually estimated at 60 to 65%. Normal right ventricular size and function.     DOS  22   Son present and updated questions answered  Pt in bed has ngt briefly arouses    3/15/2022  In bed responds a little has ngt   Nurse/aide present    3/14/2022  In bed more awake has ngt  afebrile 2L cr2.95 wbc4.7     3/13/2022  In bed briefly arouses   Does not stay awake   Spoke with nurse unable to give meds    3/12/2022  briefly arouses has no abd pain    3/11/2022  In bed open eyes briefly still lethargic  cdiff +  REVIEW OF SYSTEMS     CONSTITUTIONAL:   AS IN HPI     Medications Prior to Admission: pantoprazole (PROTONIX) 40 MG tablet, Take 1 tablet by mouth every morning (before breakfast)  [] amoxicillin-clavulanate (AUGMENTIN) 875-125 MG per tablet, Take 1 tablet by mouth every 12 hours for 10 days  apixaban (ELIQUIS) 5 MG TABS tablet, Take 5 mg by mouth 2 times daily Take in the morning and at bedtime for 3 months (started 2/19/2022)  atorvastatin (LIPITOR) 10 MG tablet, Take 10 mg by mouth daily  lisinopril (PRINIVIL;ZESTRIL) 5 MG tablet, take 1 tablet by mouth once daily  pregabalin (LYRICA) 75 MG capsule,   nystatin (MYCOSTATIN) 586786 UNIT/GM powder, Apply 3 times daily. ipratropium-albuterol (DUONEB) 0.5-2.5 (3) MG/3ML SOLN nebulizer solution, Inhale 3 mLs into the lungs every 6 hours as needed for Shortness of Breath  ondansetron (ZOFRAN-ODT) 4 MG disintegrating tablet, Take 1 tablet by mouth every 8 hours as needed for Nausea or Vomiting  allopurinol (ZYLOPRIM) 100 MG tablet, Take 100 mg by mouth daily  Cyanocobalamin (B-12 COMPLIANCE INJECTION) 1000 MCG/ML KIT, Inject as directed monthly  metoprolol succinate (TOPROL XL) 50 MG extended release tablet, take 1 tablet by mouth once daily  desvenlafaxine succinate (PRISTIQ) 50 MG TB24 extended release tablet, Take 1 tablet by mouth every evening  cloZAPine (CLOZARIL) 50 MG tablet, Take 50 mg by mouth nightly  cycloSPORINE (SANDIMMUNE) 100 MG capsule, Take 100 mg by mouth 2 times daily  predniSONE (DELTASONE) 5 MG tablet, Take 5 mg by mouth daily  folic acid (FOLVITE) 1 MG tablet, Take 1 mg by mouth daily  docusate sodium (COLACE) 100 MG capsule, Take 100 mg by mouth 2 times daily  mycophenolate (CELLCEPT) 500 MG tablet, Take 1,000 mg by mouth 2 times daily  levothyroxine (SYNTHROID) 50 MCG tablet, Take 50 mcg by mouth daily.     budesonide (PULMICORT) 0.5 MG/2ML nebulizer suspension, Take 2 mLs by nebulization 2 times daily for 10 days  CURRENT MEDICATIONS     Current Facility-Administered Medications:     0.9 % sodium chloride infusion, , IntraVENous, PRN, Saima Ramirez MD    sodium bicarbonate tablet 650 mg, 650 mg, Oral, BID, Yolanda Dominique MD, 650 mg at 03/16/22 1158    albumin human 25 % IV solution 25 g, 25 g, IntraVENous, BID, Yolanda Dominique MD    furosemide (LASIX) injection 40 mg, 40 mg, IntraVENous, BID, Reyna Quinonez MD    magnesium oxide (MAG-OX) tablet 400 mg, 400 mg, Oral, Daily, Macario Yi MD, 400 mg at 03/16/22 0933    potassium chloride (KLOR-CON M) extended release tablet 20 mEq, 20 mEq, Oral, Once, Tori Niño MD    docusate (COLACE) 50 MG/5ML liquid 100 mg, 100 mg, Per NG tube, BID, Tori Niño MD, 100 mg at 03/16/22 0933    ipratropium-albuterol (Isabel Pole) nebulizer solution 1 ampule, 1 ampule, Inhalation, Q4H, Cricket Dyer MD, 1 ampule at 03/16/22 0959    acyclovir (ZOVIRAX) 350 mg in dextrose 5 % 50 mL IVPB, 5 mg/kg (Adjusted), IntraVENous, Q12H, Carmelo Gonzalez MD, Last Rate: 50 mL/hr at 03/16/22 1154, 350 mg at 03/16/22 1154    metronidazole (FLAGYL) 500 mg in NaCl 100 mL IVPB premix, 500 mg, IntraVENous, Q8H, Carmelo Gonzalez MD, Stopped at 03/16/22 0737    midodrine (PROAMATINE) tablet 5 mg, 5 mg, Oral, BID PRN, Tori Niño MD    lidocaine 1 % injection 5 mL, 5 mL, IntraDERmal, Once, Tori Niño MD    sodium chloride flush 0.9 % injection 5-40 mL, 5-40 mL, IntraVENous, 2 times per day, Tori Niño MD, 10 mL at 03/16/22 1213    sodium chloride flush 0.9 % injection 5-40 mL, 5-40 mL, IntraVENous, PRN, Mumtaz Ramirez MD    0.9 % sodium chloride infusion, 25 mL, IntraVENous, PRN, Mumtaz Ramirez MD    heparin flush 100 UNIT/ML injection 100 Units, 1 mL, IntraVENous, 2 times per day, Tori Niño MD, 100 Units at 03/15/22 1435    heparin flush 100 UNIT/ML injection 100 Units, 1 mL, IntraCATHeter, PRN, Tori Niño MD    [Held by provider] metoprolol tartrate (LOPRESSOR) tablet 12.5 mg, 12.5 mg, Oral, BID, Helio Garibay MD    Fidaxomicin (DIFICID) tablet 200 mg, 200 mg, Oral, BID, Carmelo Gonzalez MD, 200 mg at 03/16/22 0933    levoFLOXacin (LEVAQUIN) 750 MG/150ML infusion 750 mg, 750 mg, IntraVENous, Q48H, Carmelo Gonzalez MD, Stopped at 03/15/22 1800    apixaban (ELIQUIS) tablet 5 mg, 5 mg, Oral, BID, Tori Niño MD, 5 mg at 03/16/22 0932    desvenlafaxine succinate (PRISTIQ) extended release tablet 50 mg, 50 mg, Oral, Daily, Susannah Ramirez MD, 50 mg at 03/15/22 0915    ondansetron (ZOFRAN-ODT) disintegrating tablet 4 mg, 4 mg, Oral, Q8H PRN, Kunal Ramirez MD    atorvastatin (LIPITOR) tablet 10 mg, 10 mg, Oral, Nightly, Susannah Ramirez MD, 10 mg at 03/15/22 2144    cloZAPine (CLOZARIL) tablet 50 mg, 50 mg, Oral, Nightly, Susannah Ramirez MD, 50 mg at 03/15/22 2144    levothyroxine (SYNTHROID) tablet 50 mcg, 50 mcg, Oral, Daily, Aldona Habermann, MD, 50 mcg at 03/16/22 6746    pantoprazole (PROTONIX) tablet 40 mg, 40 mg, Oral, QAM AC, Susannah Ramirez MD, 40 mg at 03/16/22 4555    cycloSPORINE (SANDIMMUNE) capsule 100 mg, 100 mg, Oral, BID, Susannah Ramirez MD, 100 mg at 03/15/22 2144    mycophenolate (CELLCEPT) capsule 500 mg, 500 mg, Oral, BID, Susannah Ramirez MD, 500 mg at 03/15/22 2144    predniSONE (DELTASONE) tablet 5 mg, 5 mg, Oral, Daily, Aldona Habermann, MD, 5 mg at 03/16/22 1610    allopurinol (ZYLOPRIM) tablet 100 mg, 100 mg, Oral, Daily, Susannah Ramirez MD, 100 mg at 00/94/75 7267    folic acid (FOLVITE) tablet 1 mg, 1 mg, Oral, Daily, Aldona Habermann, MD, 1 mg at 03/16/22 0761  ALLERGIES     Macrodantin [nitrofurantoin macrocrystal] and Sulfa antibiotics  Immunization History   Administered Date(s) Administered    COVID-19, Pfizer Purple top, DILUTE for use, 12+ yrs, 30mcg/0.3mL dose 01/28/2021, 02/18/2021, 08/18/2021      Internal Administration   First Dose COVID-19, Pfizer Purple top, DILUTE for use, 12+ yrs, 30mcg/0.3mL dose  01/28/2021   Second Dose COVID-19, Pfizer Purple top, DILUTE for use, 12+ yrs, 30mcg/0.3mL dose   02/18/2021       Last COVID Lab SARS-CoV-2 (no units)   Date Value   01/20/2021 Not Detected     SARS-CoV-2 Antibody, Total (no units)   Date Value   01/13/2022 Non-Reactive     SARS-CoV-2, PCR (no units)   Date Value   02/25/2022 Not Detected     SARS-CoV-2, NAAT (no units)   Date Value   03/02/2022 Not Detected            PAST MEDICAL HISTORY     Past Medical History:   Diagnosis Date    Abnormal EKG     left bundle branch block    Acute gout involving toe of right foot 2020    Acute on chronic combined systolic (congestive) and diastolic (congestive) heart failure (HCC)     Cancer (HCC)     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    Cerebrovascular disease     Clotting disorder ([facility] 1985    thrombophlebitis after c section delivery    Depression     15 years followed by dr Kathrine Coleman Hemodialysis patient [facility])     History of blood transfusion     Hyperlipidemia     Hypertension     Hypothyroidism     Leg swelling 2020    Lymphedema of both lower extremities 2020    Peripheral vascular disease ([facility]     Renal failure 2012    renal transplant    Thrombophlebitis     after c section delivery    Thyroid disease      SURGICAL HISTORY       Past Surgical History:   Procedure Laterality Date     SECTION  1985    one normal delivery    COLECTOMY N/A 1/15/2022    DIAGNOSTIC  LAPAROSCOPY LYSIS OF ADHESIONS performed by Tiffani Lewis MD at [address] [place] [address] Drive CATH LAB PROCEDURE      normal coronaries and  normal coronaries    DIALYSIS FISTULA CREATION  march and 2012    phase one and two patient will start dialysis when needed   [address]    transfemoral venous bypass grafts    IR IVC FILTER PLACEMENT W IMAGING  2022    IR IVC FILTER PLACEMENT W IMAGING 2022 [facility] SPECIAL PROCEDURES    KIDNEY TRANSPLANT  2016    KIDNEY TRANSPLANT  2015    KIDNEY TRANSPLANT  2015    PARATHYROIDECTOMY  2006    left parathyroid  implant and parathyroidectomy    TRANSESOPHAGEAL ECHOCARDIOGRAM N/A 2022    TRANSESOPHAGEAL ECHOCARDIOGRAM WITH BUBBLE STUDY performed by Ap Lopez MD at [facility] 23     ·  [address]:    03/15/22 0845 03/15/22 1622 22 6187 22 0959   BP:  119/72 139/64    Pulse:  93 89    Resp:   12 16   Temp:  97.7 °F (36.5 °C) 98.2 °F (36.8 °C)    TempSrc:  Infrared     SpO2:  97% 97% 96%   Weight: 226 lb (102.5 kg)      Height:         Physical Exam   Constitutional/General:  In bed   Head: NC/AT  Neck: Supple, full ROM,    Pulmonary: Lungs DEC  to auscultation bilaterally. ON O2   Cardiovascular:  Regular rate and rhythm  Abdomen: Soft, + BS.    distension. Nontender. ngt  Extremities:  Warm and well perfused dec rom   Skin: Warm and dry  No rash   Neurologic:     AROUSable   MERCHANT   3/11 midline   piv     DIAGNOSTIC RESULTS   RADIOLOGY:   ECHO Transesophageal    Result Date: 3/1/2022  Transesophageal Echocardiography Report (CLEMENCIA)   Demographics   Patient Name      Russ Essex       Gender              Female                    [address] [facility] Record    [id number]           Room Number         7091  Number   Account #         [de-identified]          Procedure Date      02/28/2022   Corporate ID                         Ordering Physician  Dannielle Adams MD   Accession Number  [id number]         Referring Physician   Date of Birth     1955         Sonographer         Jack Bhatti                                                           [facility]   Age               73 year(s)         Interpreting        Dannielle Adams MD                                       Physician                                        Any Other  Procedure Type of Study   CLEMENCIA procedure:Transesophageal Echo (CLEMENCIA), Color Flow Velocity Mapping. Procedure Date Date: 02/28/2022 Start: 11:00 AM Study Location: Portable Technical Quality: Adequate visualization Indications:R/O Endocarditis. Patient Status: Routine Height: 62 inches Weight: 220 pounds BSA: 1.99 m^2 BMI: 40.24 kg/m^2 BP: 138/79 mmHg CLEMENCIA Performed By: the attending and the sonographer  Type of Anesthesia: Moderate sedation   Findings   Left Ventricle  Normal LV segmental wall motion.   Ejection fraction is visually estimated at 60 to 65%. Right Ventricle  Normal right ventricular size and function. Left Atrium  No evidence of thrombus within left atrium. Right Atrium  Normal right atrium size. Agitated saline injected for shunt evaluation shows no shunt. Mitral Valve  No vegetation. Mild mitral regurgitation is present. Tricuspid Valve  No vegetation. Aortic Valve  No vegetation. The aortic valve appears mildly sclerotic. Pulmonic Valve  No vegetation. Conclusions   Summary  Technically difficult study. No definate evidence of vegetation consistent with endocarditis. Normal LV segmental wall motion. Ejection fraction is visually estimated at 60 to 65%. Normal right ventricular size and function. Signature   ----------------------------------------------------------------  Electronically signed by General William OSUNA(Interpreting  physician) on 03/01/2022 09:11 AM  ----------------------------------------------------------------  [id number]    CT HEAD WO CONTRAST    Result Date: 2/25/2022  EXAMINATION: CT OF THE HEAD WITHOUT CONTRAST  2/25/2022 3:08 am TECHNIQUE: CT of the head was performed without the administration of intravenous contrast. Dose modulation, iterative reconstruction, and/or weight based adjustment of the mA/kV was utilized to reduce the radiation dose to as low as reasonably achievable. COMPARISON: Correlation with MRI dated 01/16/2022 HISTORY: ORDERING SYSTEM PROVIDED HISTORY: mental status change TECHNOLOGIST PROVIDED HISTORY: Has a \"code stroke\" or \"stroke alert\" been called? ->No Reason for exam:->mental status change Decision Support Exception - unselect if not a suspected or confirmed emergency medical condition->Emergency Medical Condition (MA) FINDINGS: BRAIN/VENTRICLES: There is no acute intracranial hemorrhage, mass effect or midline shift.   No abnormal extra-axial fluid collection. The gray-white differentiation is maintained without evidence of an acute infarct. There is no evidence of hydrocephalus. Minimal cortical volume loss. Scattered vascular calcifications. ORBITS: The visualized portion of the orbits demonstrate no acute abnormality. SINUSES: Significant opacification of the left greater than right mastoid air cells similar to previous. Mild mucosal thickening in the left sphenoid sinus. This was also present previously. SOFT TISSUES/SKULL:  No acute abnormality of the visualized skull or soft tissues. No acute intracranial abnormality. MRI would be useful if symptoms persist. Inflammatory changes of left greater than right mastoid air cells and left sphenoid sinus similar to prior MRI. RECOMMENDATIONS: Unavailable     NM LUNG VENT/PERFUSION (VQ)    Result Date: 2/25/2022  EXAMINATION: NUCLEAR MEDICINE VENTILATION PERFUSION SCAN. 2/25/2022 TECHNIQUE: 3.5 millicuries aerosolized Tc99m DTPA was administered via mask prior to planar imaging of the lungs in multiple projections. Then, 4.2 millicuries of Tc 23G MAA was administered intravenously prior to planar imaging of the lungs in similar projections. COMPARISON: Chest CT February 25, 2022 . HISTORY: ORDERING SYSTEM PROVIDED HISTORY: R/O PE TECHNOLOGIST PROVIDED HISTORY: Reason for exam:->R/O PE What reading provider will be dictating this exam?->CRC FINDINGS: PERFUSION: Distribution of radiotracer is homogeneous. No segmental defects identified. VENTILATION: Ventilation images are unremarkable. CHEST CT: Small infiltrate or atelectasis posterior right lung. Negative for pulmonary embolus.      CT ABDOMEN PELVIS W IV CONTRAST Additional Contrast? None    Result Date: 2/25/2022  EXAMINATION: CT OF THE ABDOMEN AND PELVIS WITH CONTRAST 2/25/2022 3:08 am TECHNIQUE: CT of the abdomen and pelvis was performed with the administration of intravenous contrast. Multiplanar reformatted images are provided for review. Dose modulation, iterative reconstruction, and/or weight based adjustment of the mA/kV was utilized to reduce the radiation dose to as low as reasonably achievable. COMPARISON: 01/17/2022 HISTORY: ORDERING SYSTEM PROVIDED HISTORY: Abdominal distention, AMS TECHNOLOGIST PROVIDED HISTORY: Reason for exam:->Abdominal distention, AMS Additional Contrast?->None Decision Support Exception - unselect if not a suspected or confirmed emergency medical condition->Emergency Medical Condition (MA) FINDINGS: Many images are partially degraded by patient motion related artifact. Low-attenuation focus in the central aspect of the left lobe of the liver as well as a tiny low-attenuation focus in the caudate lobe. There is also a tiny low-attenuation focus in the extreme inferior aspect of the right lobe. These are difficult to definitively characterize due to their small size but likely represent small hepatic cysts. Gallbladder is unremarkable. Spleen is normal in size. No evidence of acute pancreatitis. There is a E small exophytic cystic appearing lesion along the anterior aspect of the body of the pancreas measuring 7 Hounsfield units and 1.4 cm. This is unchanged dating back to 11/17/2021. This is also favored to be incidental but could be followed. No adrenal mass. No hydronephrosis. The native kidneys remains significantly atrophic and contain numerous cystic lesions. Some of these are hyperdense but also unchanged dating back to the 11/17/2021 exam and favored to represent hyperdense or hemorrhagic cysts. Small renal cortical calcifications and or nonobstructing stones. A right lower quadrant renal transplant demonstrates no hydronephrosis. Small hiatal hernia. Mild fluid distention of a few small bowel loops in the upper abdomen. No bowel obstruction. The cecum is mobile. The appendix is not identified with certainty. There is some localized thickening of the proximal ascending colon.   This does appear to be new compared to the previous examination. Moderate stool in the distal colon. Residual contrast in the colon likely due to contrast administered on the prior examination. Uterus is atrophic. Urinary bladder is largely decompressed with Chin catheter. Minimal gas in the bladder likely due to the catheter. Partial visualization of femoral to femoral bypass graft. Degenerative changes are present in the spine and pelvis. Please see separate dictated report for CT of the chest.     Thickening of the proximal ascending colon. Given the short-term change, this is favored to represent a localized area of colitis likely infectious or inflammatory. Neoplasia thought less likely but follow-up to resolution would be recommended. Moderate stool in the distal colon. Minimal distention of several proximal small bowel loops likely incidental or due to mild enteritis. Small hiatal hernia. Other chronic appearing findings. RECOMMENDATIONS: Unavailable     IR GUIDED IVC FILTER PLACEMENT    Result Date: 2/26/2022  EXAMINATION: INFERIOR VENA CAVA (IVC) FILTER INSERTION 2/26/2022 Procedural Personnel: Jamar Moore MD HISTORY: Indication: Bleeding with IV heparin ORDERING SYSTEM PROVIDED HISTORY: IVC filter TECHNOLOGIST PROVIDED HISTORY: Reason for exam:->IVC filter Anticoagulation contraindicated SEDATION: None CONTRAST: Contrast agent: Isovue 320 Contrast volume: 30mL FLUOROSCOPY DOSE AND TYPE OR TIME AND EXPOSURES: Fluoroscopy time/Number of Images: Fluoroscopy time 1.5 minutes. Reference air [place] kerma: [id number] PROCEDURE: Informed consent for the procedure including risks, benefits and alternatives was obtained and time-out was performed prior to the procedure. Universal protocol was followed.  A suitable skin site was prepared and draped using all elements of maximal sterile barrier technique including sterile gloves, sterile gown, cap, mask, large sterile sheet, sterile ultrasound probe cover, hand hygiene, and cutaneous antisepsis. Time-out was called and the patient's order and identity were verified. Local anesthesia was administered. The vessel was sonographically evaluated and judged appropriate for access. Real time ultrasound was used to visualize needle entry into the vessel and an ultrasound image was stored in PACS. Vein accessed: Right common femoral vein. Access technique: Micropuncture set with 21 gauge needle A 0.035 guide wire was used to place a catheter into the inferior vena cava. A vena cavogram was performed. A [place] filter was deployed in routine fashion in the infrarenal IVC under fluoroscopic guidance. The sheath was removed and hemostasis was achieved with manual compression. A sterile dressing was applied. Patient tolerated procedure well. Complications: No immediate complications. Standardized report: [id number] [id number] FINDINGS: US: The access vein is patent. Inferior Vena Cavogram: The vena cava is patent and normal in size without thrombus or anomalies. Post-placement imaging: Spot filmdemonstrates the filter in the infrarenalvena cava with less than 15 degrees tilt from the vena cava access. Successful vena cava filter placement. PLAN: Retrieval intent (MIPS): The filter is potentially retrievable. Plan/Timing For Retrieval: Ordering Physician/Contact For Retrieval Plan: [employer]     XR CHEST PORTABLE    Result Date: 3/9/2022  EXAMINATION: ONE XRAY VIEW OF THE CHEST 3/9/2022 10:28 am COMPARISON: 02/25/2022 HISTORY: ORDERING SYSTEM PROVIDED HISTORY: ams TECHNOLOGIST PROVIDED HISTORY: Reason for exam:->ams FINDINGS: The cardiac silhouette is at upper limits of normal in size. There are no findings of failure There is a linear focus seen within the right perihilar region favored to represent atelectasis. There is no focal consolidation seen within the right or left lung to suggest pneumonia. There is no pleural effusion.      1. Right perihilar discoid atelectasis. 2. There are no findings of failure or pneumonia. XR CHEST 1 VIEW    Result Date: 2/25/2022  EXAMINATION: ONE XRAY VIEW OF THE CHEST 2/25/2022 2:49 pm COMPARISON: None. HISTORY: ORDERING SYSTEM PROVIDED HISTORY: needs to be done with VQ scan TECHNOLOGIST PROVIDED HISTORY: Last chest Xray done more than 24 hours ago Reason for exam:->needs to be done with VQ scan FINDINGS: The heart is mildly enlarged. There are no findings of failure. The lungs are clear. There is no focal infiltrate or effusion. .     1. Mild cardiomegaly. There is no evidence of failure or pneumonia. CTA PULMONARY W CONTRAST    Result Date: 2/25/2022  EXAMINATION: CTA OF THE CHEST 2/25/2022 3:08 am TECHNIQUE: CTA of the chest was performed after the administration of intravenous contrast.  Multiplanar reformatted images are provided for review. MIP images are provided for review. Dose modulation, iterative reconstruction, and/or weight based adjustment of the mA/kV was utilized to reduce the radiation dose to as low as reasonably achievable. COMPARISON: Portable chest dated 01/22/2022 and CT dated 01/17/2022 HISTORY: ORDERING SYSTEM PROVIDED HISTORY: Hypoxic, SOB TECHNOLOGIST PROVIDED HISTORY: Reason for exam:->Hypoxic, SOB Decision Support Exception - unselect if not a suspected or confirmed emergency medical condition->Emergency Medical Condition (MA) FINDINGS: Pulmonary Arteries: Assessment is limited due to patient motion artifact. A small or peripheral embolism would be difficult to exclude but no large or central embolism identified. Mediastinum: Moderately severe cardiomegaly is similar to previous. No pericardial effusion. No aortic dissection. Scattered vascular calcifications. Normal-size lymph nodes without adenopathy by size criteria. Lungs/pleura: No pneumothorax. Mild atelectasis in the right greater than left lung base.   Otherwise, there has been significant improvement in aeration of the lungs since previous. Upper Abdomen: Partial visualization of the upper right kidney with cystic appearing foci as well as a partially visualized hyperdense lesion likely representing a hyperdense proteinaceous cyst and similar to previous but continued follow-up would be useful. Visualized portions of the upper abdomen demonstrate no acute abnormality. Small hiatal hernia. Soft Tissues/Bones: Degenerative changes are scattered in the spine. Allowing for patient motion artifact, no identified pulmonary embolism. Significant improvement in aeration of the lungs with some residual areas of presumed atelectasis in the right greater than left base. Cardiomegaly. RECOMMENDATIONS: Unavailable     US DUP UPPER EXTREMITY LEFT VENOUS    Result Date: 2/27/2022  EXAMINATION: VENOUS ULTRASOUND OF THE LEFT UPPER EXTREMITY, 2/27/2022 12:55 pm TECHNIQUE: Duplex ultrasound using B-mode/gray scaled imaging and Doppler spectral analysis and color flow was obtained of the deep venous structures of the left upper extremity. COMPARISON: None. HISTORY: ORDERING SYSTEM PROVIDED HISTORY: left arm edema TECHNOLOGIST PROVIDED HISTORY: Portable please if possible Reason for exam:->left arm edema What reading provider will be dictating this exam?->CRC FINDINGS: There is normal flow and compressibility of the visualized venous structures. There is no evidence of echogenic thrombus. The veins demonstrate good compressibility with normal color flow study and spectral analysis. The AV fistula is patent. No evidence of DVT.      IR ULTRASOUND GUIDANCE VASCULAR ACCESS    Result Date: 2/26/2022  EXAMINATION: INFERIOR VENA CAVA (IVC) FILTER INSERTION 2/26/2022 Procedural Personnel: Brooklynn Lucio MD HISTORY: Indication: Bleeding with IV heparin ORDERING SYSTEM PROVIDED HISTORY: IVC filter TECHNOLOGIST PROVIDED HISTORY: Reason for exam:->IVC filter Anticoagulation contraindicated SEDATION: None CONTRAST: Contrast agent: Isovue 320 Contrast volume: 30mL FLUOROSCOPY DOSE AND TYPE OR TIME AND EXPOSURES: Fluoroscopy time/Number of Images: Fluoroscopy time 1.5 minutes. Reference air [place] kerma: [id number] PROCEDURE: Informed consent for the procedure including risks, benefits and alternatives was obtained and time-out was performed prior to the procedure. Universal protocol was followed. A suitable skin site was prepared and draped using all elements of maximal sterile barrier technique including sterile gloves, sterile gown, cap, mask, large sterile sheet, sterile ultrasound probe cover, hand hygiene, and cutaneous antisepsis. Time-out was called and the patient's order and identity were verified. Local anesthesia was administered. The vessel was sonographically evaluated and judged appropriate for access. Real time ultrasound was used to visualize needle entry into the vessel and an ultrasound image was stored in PACS. Vein accessed: Right common femoral vein. Access technique: Micropuncture set with 21 gauge needle A 0.035 guide wire was used to place a catheter into the inferior vena cava. A vena cavogram was performed. A Bard Rosa filter was deployed in routine fashion in the infrarenal IVC under fluoroscopic guidance. The sheath was removed and hemostasis was achieved with manual compression. A sterile dressing was applied. Patient tolerated procedure well. Complications: No immediate complications. Standardized report: [id number] [id number] FINDINGS: US: The access vein is patent. Inferior Vena Cavogram: The vena cava is patent and normal in size without thrombus or anomalies. Post-placement imaging: Spot filmdemonstrates the filter in the infrarenalvena cava with less than 15 degrees tilt from the vena cava access. Successful vena cava filter placement. PLAN: Retrieval intent (MIPS): The filter is potentially retrievable.  Plan/Timing For Retrieval: Ordering Physician/Contact For Retrieval Plan: Naida GUERRIER MODIFIED BARIUM SWALLOW W VIDEO    Result Date: 2/26/2022  EXAMINATION: MODIFIED BARIUM SWALLOW WAS PERFORMED IN CONJUNCTION WITH SPEECH PATHOLOGY SERVICES TECHNIQUE: Fluoroscopic evaluation of the swallowing mechanism was performed using cineradiography with multiple consistency of barium product in conjunction with speech pathology services. FLUOROSCOPY DOSE AND TYPE OR TIME AND EXPOSURES: Fluoroscopy time 2.1 minutes. Air kerma 5.49 mGy COMPARISON: None HISTORY: ORDERING SYSTEM PROVIDED HISTORY: dysphagia, aspriation pna? TECHNOLOGIST PROVIDED HISTORY: Reason for exam:->dysphagia, aspriation pna? FINDINGS: There was oral delay and pharyngeal delay. Laryngeal elevation was adequate. There was retention in the piriform sinuses but not the vallecula. Transient laryngeal penetration is seen with thin liquid barium. Otherwise, no aspiration or laryngeal penetration is seen. Strands it laryngeal penetration with thin liquid barium. No evidence of aspiration. Please see separate speech pathology report for full discussion of findings and recommendations. RECOMMENDATIONS: Unavailable     US DUP LOWER EXTREMITIES BILATERAL VENOUS    Result Date: 2/25/2022  EXAMINATION: DUPLEX VENOUS ULTRASOUND OF THE BILATERAL LOWER EXTREMITIES2/25/2022 2:47 pm TECHNIQUE: Duplex ultrasound using B-mode/gray scaled imaging, Doppler spectral analysis and color flow Doppler was obtained of the deep venous structures of the lower bilateral extremities. COMPARISON: None. HISTORY: ORDERING SYSTEM PROVIDED HISTORY: R/O DVT TECHNOLOGIST PROVIDED HISTORY: Reason for exam:->R/O DVT What reading provider will be dictating this exam?->CRC FINDINGS: Right lower extremity:  The visualized veins of the bilateral lower extremities are patent and free of echogenic thrombus. The veins demonstrate good compressibility with normal color flow study and spectral analysis.  Left lower extremity: There are incompletely occlusive thrombi within the mid and distal left femoral vein and the popliteal vein. They have developed in the interim since the prior lower extremity Doppler from 11/17/2021.     1. INCOMPLETELY OCCLUSIVE THROMBI in the mid and distal LEFT femoral vein and within the popliteal vein. They have developed in the interim since the previous study from 11/17/2021. 2. No evidence of DVT in the right lower extremity. RECOMMENDATIONS: Unavailable     LABS  Recent Labs     03/14/22  0510 03/15/22  0520 03/16/22  0630   WBC 4.7 4.8 4.4*   HGB 8.1* 7.9* 7.1*   HCT 27.4* 27.4* 23.6*   .2* 108.7* 107.8*    167 219     Recent Labs     03/14/22  0510 03/14/22  0510 03/15/22  0520 03/15/22  0520 03/16/22  0908     --  143  --  145   K 3.4*   < > 3.9   < > 3.5   *   < > 116*   < > 113*   CO2 19*   < > 17*   < > 22   BUN 40*  --  36*  --  31*   CREATININE 2.5*  --  2.3*  --  1.8*   GFRAA 23  --  26  --  34   LABGLOM 19  --  21  --  28   GLUCOSE 89  --  118*  --  97   PROT 4.0*  --  3.9*  --  4.1*   LABALBU 2.0*  --  1.9*  --  2.4*   CALCIUM 8.4*  --  8.6  --  9.2   BILITOT 0.3  --  0.4  --  0.2   ALKPHOS 55  --  61  --  48   AST 9  --  23  --  20   ALT 9  --  17  --  17    < > = values in this interval not displayed. No results for input(s): PROCAL in the last 72 hours. Lab Results   Component Value Date    CRP 18.3 (H) 01/23/2022    CRP 34.6 (H) 11/17/2021     Lab Results   Component Value Date    SEDRATE 61 (H) 01/23/2022    SEDRATE 62 (H) 11/20/2021     No results found for: [id number]  Lab Results   Component Value Date    COVID19 Not Detected 03/02/2022    COVID19 Not Detected 02/25/2022     COVID-19/MARINA-COV2 LABS  Recent Labs     03/14/22  0510 03/15/22  0520 03/16/22  0908   AST 9 23 20   ALT 9 17 17      MICROBIOLOGY:          Lab Results   Component Value Date    BC 5 Days no growth 03/09/2022    BC 5 Days no growth 02/27/2022    BC  02/25/2022     This organism was isolated in one set.   Susceptibility testing is not routinely done as this  organism frequently represents skin contamination. Additional testing can be ordered by calling the  Microbiology Department. ORG Pseudomonas aeruginosa 03/09/2022    ORG Staphylococcus coagulase-negative 02/25/2022    ORG Enterococcus faecalis 01/12/2022     Lab Results   Component Value Date    BLOODCULT2 5 Days no growth 03/09/2022    BLOODCULT2 5 Days no growth 02/25/2022    BLOODCULT2 5 Days no growth 01/24/2022    ORG Pseudomonas aeruginosa 03/09/2022    ORG Staphylococcus coagulase-negative 02/25/2022    ORG Enterococcus faecalis 01/12/2022       WOUND/ABSCESS   Date Value Ref Range Status   01/24/2022 Growth not present  Final        Urine Culture, Routine   Date Value Ref Range Status   03/09/2022 75,000 CFU/ml  Final   02/26/2022 Growth not present  Final   01/23/2022 Growth not present  Final     MRSA Culture Only   Date Value Ref Range Status   11/17/2021 Methicillin resistant Staph aureus not isolated  Final          FINAL IMPRESSION    Patient is a 73 y.o. female who presented with   Chief Complaint   Patient presents with    Altered Mental Status     from NH    and admitted for Confusion [R41.0]  Acute respiratory failure with hypoxia ([address] [J96.01]  Acute respiratory failure with hypoxemia (HCC) [J96.01]     PT WITH ENCEPHALOPTHAY METABOLIC VS INFECTIOUS ETIOLOGY   H/O hsv IG G + day 7  S/P RX ASPIRATION PNEUMONIA/COLITIS  H/O COVID  KIDNEY TRANSPLANT ON IMMUNOSUPPRESSIVE  Poss UTI Pseudomonas  On levaquin day 4  MASTOIDITIS/SINUSITIS  CIDFF + on dificid day5/10,  flagyl IV day 4 will sto pto see if improves MS            follow labs cr 1.8        Imaging and labs were reviewed per medical records      Thank you for involving me in the care of Josh Dubin. Please do not hesitate to call for any questions or concerns.          Electronically signed by Xavi Palmer MD on 3/16/2022 at 12:41 PM

## 2022-03-16 NOTE — PLAN OF CARE
Problem: Falls - Risk of:  Goal: Will remain free from falls  Description: Will remain free from falls  3/16/2022 0108 by Alba Newell RN  Outcome: Met This Shift  3/15/2022 1632 by Andreea Almaraz RN  Outcome: Met This Shift  Goal: Absence of physical injury  Description: Absence of physical injury  3/16/2022 0108 by Alba Newell RN  Outcome: Met This Shift  3/15/2022 1632 by Andreea Almaraz RN  Outcome: Met This Shift

## 2022-03-17 LAB
ALBUMIN SERPL-MCNC: 2.8 G/DL (ref 3.5–5.2)
ALP BLD-CCNC: 58 U/L (ref 35–104)
ALT SERPL-CCNC: 17 U/L (ref 0–32)
ANION GAP SERPL CALCULATED.3IONS-SCNC: 8 MMOL/L (ref 7–16)
ANISOCYTOSIS: ABNORMAL
AST SERPL-CCNC: 19 U/L (ref 0–31)
BASOPHILS ABSOLUTE: 0 E9/L (ref 0–0.2)
BASOPHILS RELATIVE PERCENT: 0 % (ref 0–2)
BILIRUB SERPL-MCNC: 0.5 MG/DL (ref 0–1.2)
BUN BLDV-MCNC: 32 MG/DL (ref 6–23)
CALCIUM SERPL-MCNC: 10.2 MG/DL (ref 8.6–10.2)
CHLORIDE BLD-SCNC: 112 MMOL/L (ref 98–107)
CO2: 24 MMOL/L (ref 22–29)
CREAT SERPL-MCNC: 1.5 MG/DL (ref 0.5–1)
EOSINOPHILS ABSOLUTE: 0.23 E9/L (ref 0.05–0.5)
EOSINOPHILS RELATIVE PERCENT: 3 % (ref 0–6)
FERRITIN: 745 NG/ML
GFR AFRICAN AMERICAN: 42
GFR NON-AFRICAN AMERICAN: 35 ML/MIN/1.73
GLUCOSE BLD-MCNC: 134 MG/DL (ref 74–99)
HCT VFR BLD CALC: 30.4 % (ref 34–48)
HEMOGLOBIN: 9.1 G/DL (ref 11.5–15.5)
IRON SATURATION: 87 % (ref 15–50)
IRON: 80 MCG/DL (ref 37–145)
LYMPHOCYTES ABSOLUTE: 0.9 E9/L (ref 1.5–4)
LYMPHOCYTES RELATIVE PERCENT: 12 % (ref 20–42)
MCH RBC QN AUTO: 30.8 PG (ref 26–35)
MCHC RBC AUTO-ENTMCNC: 29.9 % (ref 32–34.5)
MCV RBC AUTO: 103.1 FL (ref 80–99.9)
MONOCYTES ABSOLUTE: 0.45 E9/L (ref 0.1–0.95)
MONOCYTES RELATIVE PERCENT: 6 % (ref 2–12)
MYELOCYTE PERCENT: 2 % (ref 0–0)
NEUTROPHILS ABSOLUTE: 5.93 E9/L (ref 1.8–7.3)
NEUTROPHILS RELATIVE PERCENT: 77 % (ref 43–80)
NUCLEATED RED BLOOD CELLS: 1 /100 WBC
OVALOCYTES: ABNORMAL
PDW BLD-RTO: 18.9 FL (ref 11.5–15)
PLATELET # BLD: 189 E9/L (ref 130–450)
PMV BLD AUTO: 12.5 FL (ref 7–12)
POIKILOCYTES: ABNORMAL
POTASSIUM SERPL-SCNC: 3.2 MMOL/L (ref 3.5–5)
RBC # BLD: 2.95 E12/L (ref 3.5–5.5)
SODIUM BLD-SCNC: 144 MMOL/L (ref 132–146)
TOTAL IRON BINDING CAPACITY: 92 MCG/DL (ref 250–450)
TOTAL PROTEIN: 4.5 G/DL (ref 6.4–8.3)
WBC # BLD: 7.5 E9/L (ref 4.5–11.5)

## 2022-03-17 PROCEDURE — 1200000000 HC SEMI PRIVATE

## 2022-03-17 PROCEDURE — 6360000002 HC RX W HCPCS: Performed by: INTERNAL MEDICINE

## 2022-03-17 PROCEDURE — 87449 NOS EACH ORGANISM AG IA: CPT

## 2022-03-17 PROCEDURE — 6360000002 HC RX W HCPCS: Performed by: SPECIALIST

## 2022-03-17 PROCEDURE — 85025 COMPLETE CBC W/AUTO DIFF WBC: CPT

## 2022-03-17 PROCEDURE — 94640 AIRWAY INHALATION TREATMENT: CPT

## 2022-03-17 PROCEDURE — 6370000000 HC RX 637 (ALT 250 FOR IP): Performed by: INTERNAL MEDICINE

## 2022-03-17 PROCEDURE — P9047 ALBUMIN (HUMAN), 25%, 50ML: HCPCS | Performed by: INTERNAL MEDICINE

## 2022-03-17 PROCEDURE — 97530 THERAPEUTIC ACTIVITIES: CPT

## 2022-03-17 PROCEDURE — 36415 COLL VENOUS BLD VENIPUNCTURE: CPT

## 2022-03-17 PROCEDURE — 6370000000 HC RX 637 (ALT 250 FOR IP): Performed by: SPECIALIST

## 2022-03-17 PROCEDURE — 83540 ASSAY OF IRON: CPT

## 2022-03-17 PROCEDURE — 97110 THERAPEUTIC EXERCISES: CPT

## 2022-03-17 PROCEDURE — 92526 ORAL FUNCTION THERAPY: CPT | Performed by: SPEECH-LANGUAGE PATHOLOGIST

## 2022-03-17 PROCEDURE — 2580000003 HC RX 258: Performed by: INTERNAL MEDICINE

## 2022-03-17 PROCEDURE — 82728 ASSAY OF FERRITIN: CPT

## 2022-03-17 PROCEDURE — 2700000000 HC OXYGEN THERAPY PER DAY

## 2022-03-17 PROCEDURE — 83550 IRON BINDING TEST: CPT

## 2022-03-17 PROCEDURE — 2580000003 HC RX 258: Performed by: SPECIALIST

## 2022-03-17 PROCEDURE — 80053 COMPREHEN METABOLIC PANEL: CPT

## 2022-03-17 RX ORDER — SODIUM BICARBONATE 650 MG/1
650 TABLET ORAL DAILY
Status: DISCONTINUED | OUTPATIENT
Start: 2022-03-17 | End: 2022-03-23 | Stop reason: HOSPADM

## 2022-03-17 RX ORDER — ALBUMIN (HUMAN) 12.5 G/50ML
25 SOLUTION INTRAVENOUS 2 TIMES DAILY
Status: COMPLETED | OUTPATIENT
Start: 2022-03-17 | End: 2022-03-17

## 2022-03-17 RX ORDER — POTASSIUM CHLORIDE 20 MEQ/1
40 TABLET, EXTENDED RELEASE ORAL 2 TIMES DAILY WITH MEALS
Status: DISPENSED | OUTPATIENT
Start: 2022-03-17 | End: 2022-03-18

## 2022-03-17 RX ORDER — CYCLOSPORINE 100 MG/ML
100 SOLUTION ORAL 2 TIMES DAILY
Status: DISCONTINUED | OUTPATIENT
Start: 2022-03-17 | End: 2022-03-20 | Stop reason: SDUPTHER

## 2022-03-17 RX ORDER — FUROSEMIDE 10 MG/ML
60 INJECTION INTRAMUSCULAR; INTRAVENOUS ONCE
Status: COMPLETED | OUTPATIENT
Start: 2022-03-17 | End: 2022-03-17

## 2022-03-17 RX ADMIN — ACYCLOVIR SODIUM 350 MG: 50 INJECTION, SOLUTION INTRAVENOUS at 22:27

## 2022-03-17 RX ADMIN — POTASSIUM CHLORIDE 40 MEQ: 20 TABLET, EXTENDED RELEASE ORAL at 19:26

## 2022-03-17 RX ADMIN — VENLAFAXINE 37.5 MG: 37.5 TABLET ORAL at 19:26

## 2022-03-17 RX ADMIN — ALBUMIN (HUMAN) 25 G: 0.25 INJECTION, SOLUTION INTRAVENOUS at 10:39

## 2022-03-17 RX ADMIN — ACYCLOVIR SODIUM 350 MG: 50 INJECTION, SOLUTION INTRAVENOUS at 11:08

## 2022-03-17 RX ADMIN — HEPARIN 100 UNITS: 100 SYRINGE at 10:28

## 2022-03-17 RX ADMIN — CYCLOSPORINE 100 MG: 100 SOLUTION ORAL at 14:22

## 2022-03-17 RX ADMIN — MAGNESIUM OXIDE 400 MG: 400 TABLET ORAL at 10:31

## 2022-03-17 RX ADMIN — FUROSEMIDE 60 MG: 10 INJECTION, SOLUTION INTRAMUSCULAR; INTRAVENOUS at 10:23

## 2022-03-17 RX ADMIN — IPRATROPIUM BROMIDE AND ALBUTEROL SULFATE 1 AMPULE: .5; 2.5 SOLUTION RESPIRATORY (INHALATION) at 02:09

## 2022-03-17 RX ADMIN — IPRATROPIUM BROMIDE AND ALBUTEROL SULFATE 1 AMPULE: .5; 2.5 SOLUTION RESPIRATORY (INHALATION) at 21:56

## 2022-03-17 RX ADMIN — PREDNISONE 5 MG: 5 TABLET ORAL at 10:44

## 2022-03-17 RX ADMIN — IPRATROPIUM BROMIDE AND ALBUTEROL SULFATE 1 AMPULE: .5; 2.5 SOLUTION RESPIRATORY (INHALATION) at 15:04

## 2022-03-17 RX ADMIN — MYCOPHENOLATE MOFETIL 500 MG: 200 POWDER, FOR SUSPENSION ORAL at 12:05

## 2022-03-17 RX ADMIN — SODIUM BICARBONATE 650 MG: 648 TABLET ORAL at 10:45

## 2022-03-17 RX ADMIN — ATORVASTATIN CALCIUM 10 MG: 10 TABLET, FILM COATED ORAL at 21:27

## 2022-03-17 RX ADMIN — FIDAXOMICIN 200 MG: 200 TABLET, FILM COATED ORAL at 21:28

## 2022-03-17 RX ADMIN — VENLAFAXINE 37.5 MG: 37.5 TABLET ORAL at 10:31

## 2022-03-17 RX ADMIN — Medication 10 ML: at 10:46

## 2022-03-17 RX ADMIN — FOLIC ACID 1 MG: 1 TABLET ORAL at 10:43

## 2022-03-17 RX ADMIN — DOCUSATE SODIUM 100 MG: 50 LIQUID ORAL at 21:28

## 2022-03-17 RX ADMIN — CYCLOSPORINE 100 MG: 100 SOLUTION ORAL at 21:30

## 2022-03-17 RX ADMIN — FUROSEMIDE 60 MG: 10 INJECTION, SOLUTION INTRAMUSCULAR; INTRAVENOUS at 21:29

## 2022-03-17 RX ADMIN — FIDAXOMICIN 200 MG: 200 TABLET, FILM COATED ORAL at 11:06

## 2022-03-17 RX ADMIN — APIXABAN 5 MG: 5 TABLET, FILM COATED ORAL at 21:27

## 2022-03-17 RX ADMIN — PANTOPRAZOLE SODIUM 40 MG: 40 TABLET, DELAYED RELEASE ORAL at 06:27

## 2022-03-17 RX ADMIN — APIXABAN 5 MG: 5 TABLET, FILM COATED ORAL at 10:31

## 2022-03-17 RX ADMIN — LEVOTHYROXINE SODIUM 50 MCG: 0.05 TABLET ORAL at 06:27

## 2022-03-17 RX ADMIN — HEPARIN 100 UNITS: 100 SYRINGE at 22:27

## 2022-03-17 RX ADMIN — IPRATROPIUM BROMIDE AND ALBUTEROL SULFATE 1 AMPULE: .5; 2.5 SOLUTION RESPIRATORY (INHALATION) at 18:14

## 2022-03-17 RX ADMIN — DOCUSATE SODIUM 100 MG: 50 LIQUID ORAL at 10:42

## 2022-03-17 RX ADMIN — POTASSIUM BICARBONATE 30 MEQ: 782 TABLET, EFFERVESCENT ORAL at 10:22

## 2022-03-17 RX ADMIN — ALLOPURINOL 100 MG: 100 TABLET ORAL at 10:31

## 2022-03-17 RX ADMIN — MYCOPHENOLATE MOFETIL 500 MG: 200 POWDER, FOR SUSPENSION ORAL at 21:30

## 2022-03-17 RX ADMIN — CLOZAPINE 50 MG: 25 TABLET ORAL at 21:28

## 2022-03-17 RX ADMIN — LEVOFLOXACIN 750 MG: 5 INJECTION, SOLUTION INTRAVENOUS at 19:21

## 2022-03-17 RX ADMIN — IPRATROPIUM BROMIDE AND ALBUTEROL SULFATE 1 AMPULE: .5; 2.5 SOLUTION RESPIRATORY (INHALATION) at 06:20

## 2022-03-17 RX ADMIN — ALBUMIN (HUMAN) 25 G: 0.25 INJECTION, SOLUTION INTRAVENOUS at 19:23

## 2022-03-17 ASSESSMENT — PAIN DESCRIPTION - DESCRIPTORS: DESCRIPTORS: ACHING;DISCOMFORT;SORE

## 2022-03-17 ASSESSMENT — PAIN - FUNCTIONAL ASSESSMENT: PAIN_FUNCTIONAL_ASSESSMENT: PREVENTS OR INTERFERES SOME ACTIVE ACTIVITIES AND ADLS

## 2022-03-17 ASSESSMENT — PAIN DESCRIPTION - ORIENTATION: ORIENTATION: LOWER

## 2022-03-17 ASSESSMENT — PAIN SCALES - GENERAL: PAINLEVEL_OUTOF10: 3

## 2022-03-17 ASSESSMENT — PAIN DESCRIPTION - ONSET: ONSET: GRADUAL

## 2022-03-17 ASSESSMENT — PAIN DESCRIPTION - LOCATION: LOCATION: BACK

## 2022-03-17 ASSESSMENT — PAIN DESCRIPTION - FREQUENCY: FREQUENCY: INTERMITTENT

## 2022-03-17 ASSESSMENT — PAIN DESCRIPTION - PROGRESSION: CLINICAL_PROGRESSION: NOT CHANGED

## 2022-03-17 NOTE — PROGRESS NOTES
Pulmonary/Critical Care Progress Note    We are following patient for hypoxemia, dehydration, CECILY, urinary tract infection with Pseudomonas, COPD, status post cadaveric kidney transplant, C. difficile colitis, paroxysmal atrial fibrillation, possible aspiration pneumonitis    SUBJECTIVE:  Patient appears more alert this evening and is able to answer most questions appropriately. She states her dyspnea has improved and she only becomes short of breath with exertion despite chest x-ray on 3/15/2021 showing increased opacities in the left lung base related to increasing subsegmental atelectasis. Patient states she has not been out of bed today and was encouraged to do so if possible. Also encouraged to continue using her incentive spirometer every 2 hours while awake. Her oxygen requirements remain low and SPO2 is 95% on 2 L at this time. She is still receiving bronchodilator therapy and appropriate antibiotics.     MEDICATIONS:   sodium bicarbonate  650 mg Oral BID    albumin human  25 g IntraVENous BID    venlafaxine  37.5 mg Oral BID WC    furosemide  60 mg IntraVENous BID    epoetin casey-epbx  2,000 Units SubCUTAneous Q7 Days    And    epoetin casey-epbx  3,000 Units SubCUTAneous Q7 Days    mycophenolate  500 mg Oral BID    magnesium oxide  400 mg Oral Daily    potassium chloride  20 mEq Oral Once    docusate  100 mg Per NG tube BID    ipratropium-albuterol  1 ampule Inhalation Q4H    acyclovir  5 mg/kg (Adjusted) IntraVENous Q12H    lidocaine  5 mL IntraDERmal Once    sodium chloride flush  5-40 mL IntraVENous 2 times per day    heparin flush  1 mL IntraVENous 2 times per day    [Held by provider] metoprolol tartrate  12.5 mg Oral BID    Fidaxomicin  200 mg Oral BID    levofloxacin  750 mg IntraVENous Q48H    apixaban  5 mg Oral BID    atorvastatin  10 mg Oral Nightly    cloZAPine  50 mg Oral Nightly    levothyroxine  50 mcg Oral Daily    pantoprazole  40 mg Oral QAM AC    cycloSPORINE 100 mg Oral BID    predniSONE  5 mg Oral Daily    allopurinol  100 mg Oral Daily    folic acid  1 mg Oral Daily      sodium chloride      sodium chloride       sodium chloride, midodrine, sodium chloride flush, sodium chloride, heparin flush, ondansetron      REVIEW OF SYSTEMS:  Constitutional: Denies fever, weight loss, night sweats, and reports fatigue  Skin: Denies pigmentation, dark lesions, and rashes   HEENT: Denies hearing loss, tinnitus, ear drainage, epistaxis, sore throat, and hoarseness. Cardiovascular: Denies palpitations, chest pain, and chest pressure. Respiratory: Reports occasional dry cough, denies dyspnea at rest, reports dyspnea on exertion, denies hemoptysis, apnea, and choking.   Gastrointestinal: Denies nausea, vomiting, poor appetite, diarrhea, heartburn or reflux  Genitourinary: Denies dysuria, frequency, urgency or hematuria  Musculoskeletal: Denies myalgias, muscle weakness, and bone pain  Neurological: Denies dizziness, vertigo, headache, and focal weakness  Psychological: Denies anxiety and depression  Endocrine: Denies heat intolerance and cold intolerance  Hematopoietic/Lymphatic: Denies bleeding problems and blood transfusions        OBJECTIVE:  Vitals:    03/17/22 0622   BP:    Pulse:    Resp:    Temp:    SpO2: 93%        O2 Flow Rate (L/min): 2 L/min  O2 Device: Nasal cannula    PHYSICAL EXAM:  HEENT: No head lesions, PERRL, EOMI, mouth without lesions, no nasal lesions, no cervical adenopathy palpated   Respiratory: Lungs with equal breath sounds diminished bilaterally, no adventitious sounds auscultated, no accessory muscle use   CV: Regular rate and rhythm, murmur noted, no JVD, 2+ bilateral leg edema   Abdomen: Soft, non tender, + bowel sounds, no lesions   Skin: Anasarca, adequate turgor, no rash, capillary refill <2 seconds   Extremities: Muscular strength 3/4 in 4 limbs, moves 4 limbs spontaneously, distal pulses present   Neurology: Awake and alert, follows commands, answers most questions appropriately, moves 4 limbs on command and spontaneously, equal sensation, no dysmetria, neck is supple, no meningitic signs present. LABS:  WBC   Date Value Ref Range Status   03/17/2022 7.5 4.5 - 11.5 E9/L Final   03/16/2022 4.4 (L) 4.5 - 11.5 E9/L Final   03/15/2022 4.8 4.5 - 11.5 E9/L Final     Hemoglobin   Date Value Ref Range Status   03/17/2022 9.1 (L) 11.5 - 15.5 g/dL Final   03/16/2022 8.8 (L) 11.5 - 15.5 g/dL Final   03/16/2022 7.1 (L) 11.5 - 15.5 g/dL Final     Hematocrit   Date Value Ref Range Status   03/17/2022 30.4 (L) 34.0 - 48.0 % Final   03/16/2022 28.8 (L) 34.0 - 48.0 % Final   03/16/2022 23.6 (L) 34.0 - 48.0 % Final     MCV   Date Value Ref Range Status   03/17/2022 103.1 (H) 80.0 - 99.9 fL Final   03/16/2022 107.8 (H) 80.0 - 99.9 fL Final   03/15/2022 108.7 (H) 80.0 - 99.9 fL Final     Platelets   Date Value Ref Range Status   03/17/2022 189 130 - 450 E9/L Final   03/16/2022 219 130 - 450 E9/L Final   03/15/2022 167 130 - 450 E9/L Final     Sodium   Date Value Ref Range Status   03/17/2022 144 132 - 146 mmol/L Final   03/16/2022 145 132 - 146 mmol/L Final   03/15/2022 143 132 - 146 mmol/L Final     Potassium   Date Value Ref Range Status   03/17/2022 3.2 (L) 3.5 - 5.0 mmol/L Final   03/16/2022 3.5 3.5 - 5.0 mmol/L Final   03/15/2022 3.9 3.5 - 5.0 mmol/L Final     Potassium reflex Magnesium   Date Value Ref Range Status   03/09/2022 4.7 3.5 - 5.0 mmol/L Final   02/26/2022 4.7 3.5 - 5.0 mmol/L Final     Comment:     Specimen is moderately Hemolyzed. Result may be artificially increased.    02/25/2022 4.2 3.5 - 5.0 mmol/L Final     Chloride   Date Value Ref Range Status   03/17/2022 112 (H) 98 - 107 mmol/L Final   03/16/2022 113 (H) 98 - 107 mmol/L Final   03/15/2022 116 (H) 98 - 107 mmol/L Final     CO2   Date Value Ref Range Status   03/17/2022 24 22 - 29 mmol/L Final   03/16/2022 22 22 - 29 mmol/L Final   03/15/2022 17 (L) 22 - 29 mmol/L Final     BUN   Date Value Ref Range Status   03/17/2022 32 (H) 6 - 23 mg/dL Final   03/16/2022 31 (H) 6 - 23 mg/dL Final   03/15/2022 36 (H) 6 - 23 mg/dL Final     CREATININE   Date Value Ref Range Status   03/17/2022 1.5 (H) 0.5 - 1.0 mg/dL Final   03/16/2022 1.8 (H) 0.5 - 1.0 mg/dL Final   03/15/2022 2.3 (H) 0.5 - 1.0 mg/dL Final     Glucose   Date Value Ref Range Status   03/17/2022 134 (H) 74 - 99 mg/dL Final   03/16/2022 97 74 - 99 mg/dL Final   03/15/2022 118 (H) 74 - 99 mg/dL Final     Calcium   Date Value Ref Range Status   03/17/2022 10.2 8.6 - 10.2 mg/dL Final   03/16/2022 9.2 8.6 - 10.2 mg/dL Final   03/15/2022 8.6 8.6 - 10.2 mg/dL Final     Total Protein   Date Value Ref Range Status   03/17/2022 4.5 (L) 6.4 - 8.3 g/dL Final   03/16/2022 4.1 (L) 6.4 - 8.3 g/dL Final   03/15/2022 3.9 (L) 6.4 - 8.3 g/dL Final     Albumin   Date Value Ref Range Status   03/17/2022 2.8 (L) 3.5 - 5.2 g/dL Final   03/16/2022 2.4 (L) 3.5 - 5.2 g/dL Final   03/15/2022 1.9 (L) 3.5 - 5.2 g/dL Final     Total Bilirubin   Date Value Ref Range Status   03/17/2022 0.5 0.0 - 1.2 mg/dL Final   03/16/2022 0.2 0.0 - 1.2 mg/dL Final   03/15/2022 0.4 0.0 - 1.2 mg/dL Final     Alkaline Phosphatase   Date Value Ref Range Status   03/17/2022 58 35 - 104 U/L Final   03/16/2022 48 35 - 104 U/L Final   03/15/2022 61 35 - 104 U/L Final     AST   Date Value Ref Range Status   03/17/2022 19 0 - 31 U/L Final   03/16/2022 20 0 - 31 U/L Final   03/15/2022 23 0 - 31 U/L Final     ALT   Date Value Ref Range Status   03/17/2022 17 0 - 32 U/L Final   03/16/2022 17 0 - 32 U/L Final   03/15/2022 17 0 - 32 U/L Final     GFR Non-   Date Value Ref Range Status   03/17/2022 35 >=60 mL/min/1.73 Final     Comment:     Chronic Kidney Disease: less than 60 ml/min/1.73 sq.m. Kidney Failure: less than 15 ml/min/1.73 sq.m. Results valid for patients 18 years and older.      03/16/2022 28 >=60 mL/min/1.73 Final     Comment:     Chronic Kidney Disease: less than 60 ml/min/1.73 sq.m. Kidney Failure: less than 15 ml/min/1.73 sq.m. Results valid for patients 18 years and older. 03/15/2022 21 >=60 mL/min/1.73 Final     Comment:     Chronic Kidney Disease: less than 60 ml/min/1.73 sq.m. Kidney Failure: less than 15 ml/min/1.73 sq.m. Results valid for patients 18 years and older. GFR    Date Value Ref Range Status   03/17/2022 42  Final   03/16/2022 34  Final   03/15/2022 26  Final     Magnesium   Date Value Ref Range Status   03/16/2022 1.7 1.6 - 2.6 mg/dL Final   03/15/2022 1.2 (L) 1.6 - 2.6 mg/dL Final   03/01/2022 1.9 1.6 - 2.6 mg/dL Final     Phosphorus   Date Value Ref Range Status   03/16/2022 3.0 2.5 - 4.5 mg/dL Final   03/15/2022 3.4 2.5 - 4.5 mg/dL Final   03/13/2022 4.6 (H) 2.5 - 4.5 mg/dL Final     Recent Labs     03/15/22  1619   PH 7.298*   PO2 96.4   PCO2 39.2   HCO3 18.8*   BE -7.1*   O2SAT 96.7       RADIOLOGY:  XR CHEST ABDOMEN NG PLACEMENT   Final Result   The enteric tube terminates in the stomach. XR CHEST PORTABLE   Final Result   Increased opacities in left lung base related to increasing subsegmental   atelectasis or pleural effusion. XR ABDOMEN FOR NG/OG/NE TUBE PLACEMENT   Final Result   Satisfactory position of the enteric tube. XR CHEST PORTABLE   Final Result   Addendum 1 of 1   ADDENDUM:   The floor reports slow nasogastric tube output. Based on positioning on   plain film, recommend further advancement. Final   1. Possible right pleural effusion and right basilar atelectasis. 2. Cardiomegaly without pulmonary venous hypertension. 3. Possible hiatal hernia. XR ABDOMEN FOR NG/OG/NE TUBE PLACEMENT   Final Result   Enteric tube tip in the fundus of the stomach. US RETROPERITONEAL COMPLETE   Final Result   No hydronephrosis of the transplant kidney. The bladder is decompressed by a Chin catheter.          IR FLUORO GUIDED CVA DEVICE PLMT/REPLACE/REMOVAL   Final Result   Successful placement of a dual lumen power PICC line. The tip of the PICC   line catheter was placed within the distal right subclavian vein. Lorean Snare IMPRESSION:   Successful ultrasound and fluoroscopy guided PICC placement         IR ULTRASOUND GUIDANCE VASCULAR ACCESS   Final Result   Successful placement of a dual lumen power PICC line. The tip of the PICC   line catheter was placed within the distal right subclavian vein. Lorean Snare IMPRESSION:   Successful ultrasound and fluoroscopy guided PICC placement         CT HEAD WO CONTRAST   Final Result   No acute intracranial abnormality or hemorrhage. Bilateral mastoiditis and right sphenoid sinusitis. XR SHOULDER LEFT (MIN 2 VIEWS)   Final Result   No fracture. Increased separation of the humeral head from the osseous   glenoid and AC joint, which could represent subluxation. Otherwise, a large   synovial effusion, hematoma, or mass cannot be excluded. Clinical   correlation is recommended. CT scan of the left shoulder can be performed   for further evaluation. Cardiomegaly. XR HUMERUS LEFT (MIN 2 VIEWS)   Final Result   No fracture or dislocation. Increased separation of the humeral head from   the osseous glenoid and AC joint, which could represent subluxation. A large   synovial effusion, hematoma, or mass cannot be excluded. CT scan of the left   shoulder can be considered for further evaluation. XR CHEST PORTABLE   Final Result   1. Right perihilar discoid atelectasis. 2. There are no findings of failure or pneumonia.                  PROBLEM LIST:  Principal Problem:    Acute respiratory failure with hypoxia (HCC)  Active Problems:    Kidney transplanted    Elevated troponin    Paroxysmal atrial fibrillation (HCC)    Hypotension    LBBB (left bundle branch block)    History of DVT (deep vein thrombosis)    Chronic anticoagulation    Acute respiratory failure with hypoxemia [facility])  Resolved Problems:    * No resolved hospital problems. *      IMPRESSION:  1. Probable atelectasis, question aspiration pneumonitis  2. Hypokalemia  3. Hypomagnesemia  4. History of DVT  5. History of cadaveric kidney transplant  6. History of recalcitrant C. difficile infection    PLAN:  1. Continue aggressive bronchodilators  2. Levaquin and fidaxomicin per infectious disease  3. Encourage the use of her incentive spirometer  4.  Assist patient to bedside chair daily if tolerated        Electronically signed by KATINA Chinag CNP on 3/17/2022 at 7:48 AM

## 2022-03-17 NOTE — PROGRESS NOTES
[facility] MD Douglas Nair MD Florette Muzzy, MD Cortez Sinner MD  Progress Note    Patient's Name: Ronni Cisneros  8:31 AM  3/17/2022    Subjective  3/13 : seen in her room  Weak decondioned lying flat in bed , poor po intake , clinically euvolemic   3/14: NG tube not functioning, just replaced. No meds all day as a result. IV fluids been stopped for few hours, as well. 3/15 : seen today , o2/nc . Appear comfortable . TF going at goal . Per RN pt is more alert and awake   UE edema B/L 2+ .   3/17 : clinically better wake , talktive ,edema in UE better on TF     History of Present Ilness:      Patient has h/o preserved left ventricular systolic function (CLEMENCIA  9/35/0358), stress test with no significant reversibility 6/16/2015, paroxysmal atrial fibrillation (on Eliquis), chronic left bundle branch block, HTN, HLD,  s/p kidney transplant,CKD, H/o DVT, s/p IVC on AC now, PVD, s/p thyroidectomy for cancer, gout, obesity     She was recently treated in hospitalfrom 2/25/2020 to 3/7/2022 because of acute hypoxic respiratory failure likely due to aspiration pneumonia with positive blood culture and acute metabolic encephalopathy     Patient was brought from [facility] and rehab facility to ED of [facility] on 3/9/22 due to new mental status changes. Nephrology asked to see for CECILY /CKD   Pt has hx of cadaveric kidney transplant 7 years back   Her baseline cr has been 1.2-1.6 with a lot of fluctuation in contest of dehydration . Pt seen in her room , she is very weak and deconditoned to my eyes she has lost a lot of weight .    She has a hassan in place   She has poor po intake   She is euvolemic to mildly hypovolemic       Allergies:  Macrodantin [nitrofurantoin macrocrystal] and Sulfa antibiotics    Current Medications:    potassium bicarb-citric acid (EFFER-K) effervescent tablet 30 mEq, Once  potassium chloride (KLOR-CON M) extended release tablet 40 mEq, BID WC  sodium bicarbonate tablet 650 mg, Daily  0.9 % sodium chloride infusion, PRN  albumin human 25 % IV solution 25 g, BID  venlafaxine (EFFEXOR) tablet 37.5 mg, BID WC  furosemide (LASIX) injection 60 mg, BID  epoetin casey-epbx (RETACRIT) injection 2,000 Units, Q7 Days   And  epoetin casey-epbx (RETACRIT) injection 3,000 Units, Q7 Days  mycophenolate (CELLCEPT) 200 MG/ML suspension 500 mg, BID  magnesium oxide (MAG-OX) tablet 400 mg, Daily  potassium chloride (KLOR-CON M) extended release tablet 20 mEq, Once  docusate (COLACE) 50 MG/5ML liquid 100 mg, BID  ipratropium-albuterol (DUONEB) nebulizer solution 1 ampule, Q4H  acyclovir (ZOVIRAX) 350 mg in dextrose 5 % 50 mL IVPB, Q12H  midodrine (PROAMATINE) tablet 5 mg, BID PRN  lidocaine 1 % injection 5 mL, Once  sodium chloride flush 0.9 % injection 5-40 mL, 2 times per day  sodium chloride flush 0.9 % injection 5-40 mL, PRN  0.9 % sodium chloride infusion, PRN  heparin flush 100 UNIT/ML injection 100 Units, 2 times per day  heparin flush 100 UNIT/ML injection 100 Units, PRN  [Held by provider] metoprolol tartrate (LOPRESSOR) tablet 12.5 mg, BID  Fidaxomicin (DIFICID) tablet 200 mg, BID  levoFLOXacin (LEVAQUIN) 750 MG/150ML infusion 750 mg, Q48H  apixaban (ELIQUIS) tablet 5 mg, BID  ondansetron (ZOFRAN-ODT) disintegrating tablet 4 mg, Q8H PRN  atorvastatin (LIPITOR) tablet 10 mg, Nightly  cloZAPine (CLOZARIL) tablet 50 mg, Nightly  levothyroxine (SYNTHROID) tablet 50 mcg, Daily  pantoprazole (PROTONIX) tablet 40 mg, QAM AC  cycloSPORINE (SANDIMMUNE) capsule 100 mg, BID  predniSONE (DELTASONE) tablet 5 mg, Daily  allopurinol (ZYLOPRIM) tablet 360 mg, Daily  folic acid (FOLVITE) tablet 1 mg, Daily        Review of Systems:   Constitutional: weakness   Eyes: no eye pain , no itching , no drainage  Ears, nose, mouth, throat, and face: no ear ,nose pain , hearing is ok ,no nasal drainage   Respiratory: no sob ,no cough ,no wheezing .    Cardiovascular: no chest pain , no palpitation ,no sob . Gastrointestinal: no nausea, vomiting , constipation , no abdominal pain . Genitourinary:no urinary retention , no burning , dysuria . No polyuria   Hematologic/lymphatic: no bleeding , no cougulation issues . Musculoskeletal:no joint pain , no swelling . Neurological: no headaches ,no weakness , no numbness . Endocrine: no thirst , no weight issues . Physical exam:   Vital signs /61   Pulse 97   Temp 97.5 °F (36.4 °C) (Axillary)   Resp 18   Ht 5' 2\" (1.575 m)   Wt 226 lb (102.5 kg)   SpO2 97%   BMI 41.34 kg/m²   Gen : In NAD , appears stated age . Head : NC/AT, EOMI, sclera and conjunctive are clear and anicteric. Mucous membranes dry  Neck : supple , no thyromegaly noted . Trachea midline  CV : regular rhythm, normal S1 and S2, No M/R/G . Lungs: CTAB , no wheezing , good air entry in all fields, though poor inspiratory effort  Abd : soft , NT , BS +   Skin : soft, dry . Neuro : CN  II-XII grossly intact , no focal neurologic deficit .    Psych : affect flat, minimally responsive, mostly to tactile stimulation, does not answer questions or follow commands    Data:   Labs:  CBC with Differential:    Lab Results   Component Value Date    WBC 7.5 03/17/2022    RBC 2.95 03/17/2022    HGB 9.1 03/17/2022    HCT 30.4 03/17/2022     03/17/2022    .1 03/17/2022    MCH 30.8 03/17/2022    MCHC 29.9 03/17/2022    RDW 18.9 03/17/2022    NRBC 1.0 03/17/2022    METASPCT 1.0 03/15/2022    LYMPHOPCT 12.0 03/17/2022    MONOPCT 6.0 03/17/2022    MYELOPCT 2.0 03/17/2022    BASOPCT 0.0 03/17/2022    MONOSABS 0.45 03/17/2022    LYMPHSABS 0.90 03/17/2022    EOSABS 0.23 03/17/2022    BASOSABS 0.00 03/17/2022     CMP:    Lab Results   Component Value Date     03/17/2022    K 3.2 03/17/2022    K 4.7 03/09/2022     03/17/2022    CO2 24 03/17/2022    BUN 32 03/17/2022    CREATININE 1.5 03/17/2022    GFRAA 42 03/17/2022    LABGLOM 35 03/17/2022    GLUCOSE 134 03/17/2022    PROT 4.5 03/17/2022    LABALBU 2.8 03/17/2022    CALCIUM 10.2 03/17/2022    BILITOT 0.5 03/17/2022    ALKPHOS 58 03/17/2022    AST 19 03/17/2022    ALT 17 03/17/2022     Ionized Calcium:  No results found for: IONCA  Magnesium:    Lab Results   Component Value Date    MG 1.7 03/16/2022     Phosphorus:    Lab Results   Component Value Date    PHOS 3.0 03/16/2022     U/A:    Lab Results   Component Value Date    COLORU Yellow 03/09/2022    PHUR 5.0 03/09/2022    WBCUA 1-3 03/09/2022    RBCUA NONE 03/09/2022    RBCUA 10-20 03/12/2014    YEAST Present 04/09/2021    BACTERIA MODERATE 03/09/2022    CLARITYU Clear 03/09/2022    SPECGRAV 1.025 03/09/2022    LEUKOCYTESUR Negative 03/09/2022    UROBILINOGEN 0.2 03/09/2022    BILIRUBINUR Negative 03/09/2022    BLOODU Negative 03/09/2022    GLUCOSEU Negative 03/09/2022     Microalbumen/Creatinine ratio:  No components found for: RUCREAT  Iron Saturation:  No components found for: PERCENTFE  TIBC:    Lab Results   Component Value Date    TIBC 135 01/19/2022     FERRITIN:    Lab Results   Component Value Date    FERRITIN 4,062 01/23/2022        Imaging:  XR CHEST ABDOMEN NG PLACEMENT   Final Result   The enteric tube terminates in the stomach. XR CHEST PORTABLE   Final Result   Increased opacities in left lung base related to increasing subsegmental   atelectasis or pleural effusion. XR ABDOMEN FOR NG/OG/NE TUBE PLACEMENT   Final Result   Satisfactory position of the enteric tube. XR CHEST PORTABLE   Final Result   Addendum 1 of 1   ADDENDUM:   The floor reports slow nasogastric tube output. Based on positioning on   plain film, recommend further advancement. Final   1. Possible right pleural effusion and right basilar atelectasis. 2. Cardiomegaly without pulmonary venous hypertension. 3. Possible hiatal hernia. XR ABDOMEN FOR NG/OG/NE TUBE PLACEMENT   Final Result   Enteric tube tip in the fundus of the stomach. US RETROPERITONEAL COMPLETE   Final Result   No hydronephrosis of the transplant kidney. The bladder is decompressed by a Chin catheter. IR FLUORO GUIDED CVA DEVICE PLMT/REPLACE/REMOVAL   Final Result   Successful placement of a dual lumen power PICC line. The tip of the PICC   line catheter was placed within the distal right subclavian vein. Rosario Dye IMPRESSION:   Successful ultrasound and fluoroscopy guided PICC placement         IR ULTRASOUND GUIDANCE VASCULAR ACCESS   Final Result   Successful placement of a dual lumen power PICC line. The tip of the PICC   line catheter was placed within the distal right subclavian vein. Rosario Dye IMPRESSION:   Successful ultrasound and fluoroscopy guided PICC placement         CT HEAD WO CONTRAST   Final Result   No acute intracranial abnormality or hemorrhage. Bilateral mastoiditis and right sphenoid sinusitis. XR SHOULDER LEFT (MIN 2 VIEWS)   Final Result   No fracture. Increased separation of the humeral head from the osseous   glenoid and AC joint, which could represent subluxation. Otherwise, a large   synovial effusion, hematoma, or mass cannot be excluded. Clinical   correlation is recommended. CT scan of the left shoulder can be performed   for further evaluation. Cardiomegaly. XR HUMERUS LEFT (MIN 2 VIEWS)   Final Result   No fracture or dislocation. Increased separation of the humeral head from   the osseous glenoid and AC joint, which could represent subluxation. A large   synovial effusion, hematoma, or mass cannot be excluded. CT scan of the left   shoulder can be considered for further evaluation. XR CHEST PORTABLE   Final Result   1. Right perihilar discoid atelectasis. 2. There are no findings of failure or pneumonia. Assessment    -Acute kidney injury : improving     -Hyperchrloremic acidosis :better .  Continue nahco3 tablets     -Hx of  donor kidney transplant : continue current immunosuppressent     -Immunosuppression host :  continue current immunosuppressent     -Anaemia of chronic disease : some of the anaemia is dilutional . Will start retacrit 5000 u sc q week     -Edema mainly in UE : better .  Give additional lasix/spa this evening     -Hypo Magensiemia : replace     -severe deconditioning : PT/OT         Electronically signed by Holly Lenz MD on 3/17/2022

## 2022-03-17 NOTE — PROGRESS NOTES
Pulmonary/Critical Care Progress Note    We are following patient for hypoxemic, dehydration, CECILY, UTI with Pseudomonas, COPD, status post cadaveric kidney transplant, C. difficile colitis, paroxysmal atrial fibrillation, possible aspiration pneumonitis    SUBJECTIVE:  The patient has made a remarkable recovery and is much more awake alert and perfectly oriented. We engaged in a normal conversation which is simply impossible over the last week or 2. She is afebrile and is being tested by the speech therapist at this time. She continues to receive acyclovir, levofloxacin, and fidaxomicin, the latter for recalcitrant C. difficile infection.     MEDICATIONS:   potassium chloride  40 mEq Oral BID WC    sodium bicarbonate  650 mg Oral Daily    albumin human  25 g IntraVENous BID    furosemide  60 mg IntraVENous Once    cycloSPORINE  100 mg Oral BID    venlafaxine  37.5 mg Oral BID WC    epoetin casey-epbx  2,000 Units SubCUTAneous Q7 Days    And    epoetin casey-epbx  3,000 Units SubCUTAneous Q7 Days    mycophenolate  500 mg Oral BID    magnesium oxide  400 mg Oral Daily    potassium chloride  20 mEq Oral Once    docusate  100 mg Per NG tube BID    ipratropium-albuterol  1 ampule Inhalation Q4H    acyclovir  5 mg/kg (Adjusted) IntraVENous Q12H    lidocaine  5 mL IntraDERmal Once    sodium chloride flush  5-40 mL IntraVENous 2 times per day    heparin flush  1 mL IntraVENous 2 times per day    [Held by provider] metoprolol tartrate  12.5 mg Oral BID    Fidaxomicin  200 mg Oral BID    levofloxacin  750 mg IntraVENous Q48H    apixaban  5 mg Oral BID    atorvastatin  10 mg Oral Nightly    cloZAPine  50 mg Oral Nightly    levothyroxine  50 mcg Oral Daily    pantoprazole  40 mg Oral QAM AC    predniSONE  5 mg Oral Daily    allopurinol  100 mg Oral Daily    folic acid  1 mg Oral Daily      sodium chloride      sodium chloride       sodium chloride, midodrine, sodium chloride flush, sodium chloride, heparin flush, ondansetron      REVIEW OF SYSTEMS:  Constitutional: Denies fever, weight loss, night sweats, and fatigue  Skin: Denies pigmentation, dark lesions, and rashes   HEENT: Denies hearing loss, tinnitus, ear drainage, epistaxis, sore throat, and hoarseness. Cardiovascular: Denies palpitations, chest pain, and chest pressure. Respiratory: Denies cough, dyspnea at rest, hemoptysis, apnea, and choking. Gastrointestinal: Denies nausea, vomiting, poor appetite, diarrhea, heartburn or reflux  Genitourinary: Denies dysuria, frequency, urgency or hematuria  Musculoskeletal: Denies myalgias, muscle weakness, and bone pain  Neurological: Denies dizziness, vertigo, headache, and focal weakness  Psychological: Denies anxiety and depression  Endocrine: Denies heat intolerance and cold intolerance  Hematopoietic/Lymphatic: Denies bleeding problems and blood transfusions    OBJECTIVE:  Vitals:    03/17/22 1515   BP: (!) 173/81   Pulse: 105   Resp: 18   Temp: 97.7 °F (36.5 °C)   SpO2: 96%        O2 Flow Rate (L/min): 2 L/min  O2 Device: Nasal cannula    PHYSICAL EXAM:  Constitutional: No fever, chills, diaphoresis  Skin: No skin rash, no skin biopsy  HEENT: Unremarkable  Neck: Unremarkable  Cardiovascular: S1, S2 regular. No S3 murmurs rubs present  Respiratory: Few crackles both bases  Gastrointestinal: Soft, obese, nontender  Genitourinary: No grossly bloody urine  Extremities: Trace edema feet legs and ankles  Neurological: Awake, alert, oriented x3.   No evidence of focal motor or sensory deficits  Psychological: Affect much brighter    LABS:  WBC   Date Value Ref Range Status   03/17/2022 7.5 4.5 - 11.5 E9/L Final   03/16/2022 4.4 (L) 4.5 - 11.5 E9/L Final   03/15/2022 4.8 4.5 - 11.5 E9/L Final     Hemoglobin   Date Value Ref Range Status   03/17/2022 9.1 (L) 11.5 - 15.5 g/dL Final   03/16/2022 8.8 (L) 11.5 - 15.5 g/dL Final   03/16/2022 7.1 (L) 11.5 - 15.5 g/dL Final     Hematocrit   Date Value Ref Range Status   03/17/2022 30.4 (L) 34.0 - 48.0 % Final   03/16/2022 28.8 (L) 34.0 - 48.0 % Final   03/16/2022 23.6 (L) 34.0 - 48.0 % Final     MCV   Date Value Ref Range Status   03/17/2022 103.1 (H) 80.0 - 99.9 fL Final   03/16/2022 107.8 (H) 80.0 - 99.9 fL Final   03/15/2022 108.7 (H) 80.0 - 99.9 fL Final     Platelets   Date Value Ref Range Status   03/17/2022 189 130 - 450 E9/L Final   03/16/2022 219 130 - 450 E9/L Final   03/15/2022 167 130 - 450 E9/L Final     Sodium   Date Value Ref Range Status   03/17/2022 144 132 - 146 mmol/L Final   03/16/2022 145 132 - 146 mmol/L Final   03/15/2022 143 132 - 146 mmol/L Final     Potassium   Date Value Ref Range Status   03/17/2022 3.2 (L) 3.5 - 5.0 mmol/L Final   03/16/2022 3.5 3.5 - 5.0 mmol/L Final   03/15/2022 3.9 3.5 - 5.0 mmol/L Final     Potassium reflex Magnesium   Date Value Ref Range Status   03/09/2022 4.7 3.5 - 5.0 mmol/L Final   02/26/2022 4.7 3.5 - 5.0 mmol/L Final     Comment:     Specimen is moderately Hemolyzed. Result may be artificially increased.    02/25/2022 4.2 3.5 - 5.0 mmol/L Final     Chloride   Date Value Ref Range Status   03/17/2022 112 (H) 98 - 107 mmol/L Final   03/16/2022 113 (H) 98 - 107 mmol/L Final   03/15/2022 116 (H) 98 - 107 mmol/L Final     CO2   Date Value Ref Range Status   03/17/2022 24 22 - 29 mmol/L Final   03/16/2022 22 22 - 29 mmol/L Final   03/15/2022 17 (L) 22 - 29 mmol/L Final     BUN   Date Value Ref Range Status   03/17/2022 32 (H) 6 - 23 mg/dL Final   03/16/2022 31 (H) 6 - 23 mg/dL Final   03/15/2022 36 (H) 6 - 23 mg/dL Final     CREATININE   Date Value Ref Range Status   03/17/2022 1.5 (H) 0.5 - 1.0 mg/dL Final   03/16/2022 1.8 (H) 0.5 - 1.0 mg/dL Final   03/15/2022 2.3 (H) 0.5 - 1.0 mg/dL Final     Glucose   Date Value Ref Range Status   03/17/2022 134 (H) 74 - 99 mg/dL Final   03/16/2022 97 74 - 99 mg/dL Final   03/15/2022 118 (H) 74 - 99 mg/dL Final     Calcium   Date Value Ref Range Status   03/17/2022 10.2 8.6 - 10.2 mg/dL Final   03/16/2022 9.2 8.6 - 10.2 mg/dL Final   03/15/2022 8.6 8.6 - 10.2 mg/dL Final     Total Protein   Date Value Ref Range Status   03/17/2022 4.5 (L) 6.4 - 8.3 g/dL Final   03/16/2022 4.1 (L) 6.4 - 8.3 g/dL Final   03/15/2022 3.9 (L) 6.4 - 8.3 g/dL Final     Albumin   Date Value Ref Range Status   03/17/2022 2.8 (L) 3.5 - 5.2 g/dL Final   03/16/2022 2.4 (L) 3.5 - 5.2 g/dL Final   03/15/2022 1.9 (L) 3.5 - 5.2 g/dL Final     Total Bilirubin   Date Value Ref Range Status   03/17/2022 0.5 0.0 - 1.2 mg/dL Final   03/16/2022 0.2 0.0 - 1.2 mg/dL Final   03/15/2022 0.4 0.0 - 1.2 mg/dL Final     Alkaline Phosphatase   Date Value Ref Range Status   03/17/2022 58 35 - 104 U/L Final   03/16/2022 48 35 - 104 U/L Final   03/15/2022 61 35 - 104 U/L Final     AST   Date Value Ref Range Status   03/17/2022 19 0 - 31 U/L Final   03/16/2022 20 0 - 31 U/L Final   03/15/2022 23 0 - 31 U/L Final     ALT   Date Value Ref Range Status   03/17/2022 17 0 - 32 U/L Final   03/16/2022 17 0 - 32 U/L Final   03/15/2022 17 0 - 32 U/L Final     GFR Non-   Date Value Ref Range Status   03/17/2022 35 >=60 mL/min/1.73 Final     Comment:     Chronic Kidney Disease: less than 60 ml/min/1.73 sq.m. Kidney Failure: less than 15 ml/min/1.73 sq.m. Results valid for patients 18 years and older. 03/16/2022 28 >=60 mL/min/1.73 Final     Comment:     Chronic Kidney Disease: less than 60 ml/min/1.73 sq.m. Kidney Failure: less than 15 ml/min/1.73 sq.m. Results valid for patients 18 years and older. 03/15/2022 21 >=60 mL/min/1.73 Final     Comment:     Chronic Kidney Disease: less than 60 ml/min/1.73 sq.m. Kidney Failure: less than 15 ml/min/1.73 sq.m. Results valid for patients 18 years and older.        GFR    Date Value Ref Range Status   03/17/2022 42  Final   03/16/2022 34  Final   03/15/2022 26  Final     Magnesium   Date Value Ref Range Status   03/16/2022 1.7 1.6 - 2.6 mg/dL Final   03/15/2022 1.2 (L) 1.6 - 2.6 mg/dL Final   03/01/2022 1.9 1.6 - 2.6 mg/dL Final     Phosphorus   Date Value Ref Range Status   03/16/2022 3.0 2.5 - 4.5 mg/dL Final   03/15/2022 3.4 2.5 - 4.5 mg/dL Final   03/13/2022 4.6 (H) 2.5 - 4.5 mg/dL Final     Recent Labs     03/15/22  1619   PH 7.298*   PO2 96.4   PCO2 39.2   HCO3 18.8*   BE -7.1*   O2SAT 96.7       RADIOLOGY:  XR CHEST ABDOMEN NG PLACEMENT   Final Result   The enteric tube terminates in the stomach. XR CHEST PORTABLE   Final Result   Increased opacities in left lung base related to increasing subsegmental   atelectasis or pleural effusion. XR ABDOMEN FOR NG/OG/NE TUBE PLACEMENT   Final Result   Satisfactory position of the enteric tube. XR CHEST PORTABLE   Final Result   Addendum 1 of 1   ADDENDUM:   The floor reports slow nasogastric tube output. Based on positioning on   plain film, recommend further advancement. Final   1. Possible right pleural effusion and right basilar atelectasis. 2. Cardiomegaly without pulmonary venous hypertension. 3. Possible hiatal hernia. XR ABDOMEN FOR NG/OG/NE TUBE PLACEMENT   Final Result   Enteric tube tip in the fundus of the stomach. US RETROPERITONEAL COMPLETE   Final Result   No hydronephrosis of the transplant kidney. The bladder is decompressed by a Chin catheter. IR FLUORO GUIDED CVA DEVICE PLMT/REPLACE/REMOVAL   Final Result   Successful placement of a dual lumen power PICC line. The tip of the PICC   line catheter was placed within the distal right subclavian vein. Randall Bound IMPRESSION:   Successful ultrasound and fluoroscopy guided PICC placement         IR ULTRASOUND GUIDANCE VASCULAR ACCESS   Final Result   Successful placement of a dual lumen power PICC line. The tip of the PICC   line catheter was placed within the distal right subclavian vein. Randall Bound       IMPRESSION:   Successful ultrasound and fluoroscopy guided PICC placement         CT HEAD WO CONTRAST   Final Result   No acute intracranial abnormality or hemorrhage. Bilateral mastoiditis and right sphenoid sinusitis. XR SHOULDER LEFT (MIN 2 VIEWS)   Final Result   No fracture. Increased separation of the humeral head from the osseous   glenoid and AC joint, which could represent subluxation. Otherwise, a large   synovial effusion, hematoma, or mass cannot be excluded. Clinical   correlation is recommended. CT scan of the left shoulder can be performed   for further evaluation. Cardiomegaly. XR HUMERUS LEFT (MIN 2 VIEWS)   Final Result   No fracture or dislocation. Increased separation of the humeral head from   the osseous glenoid and AC joint, which could represent subluxation. A large   synovial effusion, hematoma, or mass cannot be excluded. CT scan of the left   shoulder can be considered for further evaluation. XR CHEST PORTABLE   Final Result   1. Right perihilar discoid atelectasis. 2. There are no findings of failure or pneumonia. PROBLEM LIST:  Principal Problem:    Acute respiratory failure with hypoxia (HCC)  Active Problems:    Kidney transplanted    Elevated troponin    Paroxysmal atrial fibrillation (HCC)    Hypotension    LBBB (left bundle branch block)    History of DVT (deep vein thrombosis)    Chronic anticoagulation    Acute respiratory failure with hypoxemia (HCC)  Resolved Problems:    * No resolved hospital problems. *      IMPRESSION:  1. Improving atelectasis  2. Aspiration pneumonitis, improved  3. History of cadaveric kidney transplant  4. Urinary tract infection  5. History of recalcitrant C. difficile infection  6. History of DVT    PLAN:  1. The patient appears to have turned the corner. Her mental status is far better than it has been since I have been seeing her or then my colleagues have not seen her.     2. Chest x-ray does not show any deteriorating condition on her saturations are quite good sometimes on room air and sometimes on 2 L.  3. Therefore, we will sign off at this time since she is so much better. Please not hesitate to call for any problems or other issues.       Electronically signed by Daquan Angulo MD on 3/17/2022 at 4:03 PM

## 2022-03-17 NOTE — PROGRESS NOTES
Progress Note  Date:3/16/2022       Room:[facility]0415-  Patient Deepti Garcia     YOB: 1955     Age:66 y.o. Subjective    Subjective:  Symptoms:  Stable. (Lethargic , sleeping ,  opens her eyes  Slightly on calling her name   ). Diet:  Poor intake. Activity level: Impaired due to weakness. Review of Systems  Objective         Vitals Last 24 Hours:  TEMPERATURE:  Temp  Av.9 °F (36.6 °C)  Min: 97.4 °F (36.3 °C)  Max: 98.4 °F (36.9 °C)  RESPIRATIONS RANGE: Resp  Avg: 15.4  Min: 12  Max: 17  PULSE OXIMETRY RANGE: SpO2  Av.5 %  Min: 95 %  Max: 98 %  PULSE RANGE: Pulse  Av.8  Min: 89  Max: 103  BLOOD PRESSURE RANGE: Systolic (64WMM), LOC:652 , Min:119 , IVZ:554   ; Diastolic (82IKC), RNH:00, Min:57, Max:68    I/O (24Hr): Intake/Output Summary (Last 24 hours) at 3/16/2022 2052  Last data filed at 3/16/2022 1921  Gross per 24 hour   Intake 7253.22 ml   Output 1525 ml   Net 5728.22 ml     Objective:  General Appearance: In no acute distress (lethargic , faintly arousable ). Vital signs: (most recent): Blood pressure (!) 143/68, pulse 92, temperature 97.8 °F (36.6 °C), temperature source Oral, resp. rate 17, height 5' 2\" (1.575 m), weight 226 lb (102.5 kg), SpO2 97 %. (Bp optimal ). Output: Producing urine. HEENT: Normal HEENT exam.    Lungs:  Normal effort and normal respiratory rate. There are decreased breath sounds. Heart: Normal rate. Regular rhythm. Positive for murmur. Abdomen: Abdomen is soft and distended. Bowel sounds are normal.   There is no abdominal tenderness. Extremities: (Edema +2   Both LL )  Pulses: Distal pulses are intact. Neurological: (Lethargic and sleepy ). Pupils:  Pupils are equal, round, and reactive to light.       Labs/Imaging/Diagnostics    Labs:  CBC:  Recent Labs     22  0510 03/15/22  0520 22  0630   WBC 4.7 4.8 4.4*   RBC 2.51* 2.52* 2.19*   HGB 8.1* 7.9* 7.1*   HCT 27.4* 27.4* 23.6* .2* 108.7* 107.8*   RDW 15.7* 15.7* 16.2*    167 219     CHEMISTRIES:  Recent Labs     03/14/22  0510 03/15/22  0520 03/16/22  0630 03/16/22  0908    143  --  145   K 3.4* 3.9  --  3.5   * 116*  --  113*   CO2 19* 17*  --  22   BUN 40* 36*  --  31*   CREATININE 2.5* 2.3*  --  1.8*   GLUCOSE 89 118*  --  97   PHOS  --  3.4  --  3.0   MG  --  1.2* 1.7  --      PT/INR:No results for input(s): PROTIME, INR in the last 72 hours. APTT:No results for input(s): APTT in the last 72 hours. LIVER PROFILE:  Recent Labs     03/14/22  0510 03/15/22  0520 03/16/22  0908   AST 9 23 20   ALT 9 17 17   BILITOT 0.3 0.4 0.2   ALKPHOS 55 61 48       Imaging Last 24 Hours:  XR HUMERUS LEFT (MIN 2 VIEWS)    Result Date: 3/10/2022  EXAMINATION: TWO XRAY VIEWS OF THE LEFT HUMERUS 3/10/2022 9:31 am COMPARISON: None. HISTORY: ORDERING SYSTEM PROVIDED HISTORY: limited mobility TECHNOLOGIST PROVIDED HISTORY: Reason for exam:->limited mobility FINDINGS: There is no evidence of fracture or dislocation. No significant osteo-degenerative changes or other osseous abnormalities are identified. There is increased separation of the humeral head from the osseous glenoid and AC joint. This could represent subluxation. The presence of a large synovial effusion, hematoma, or mass cannot be excluded. CT scan of the left shoulder can be considered for further evaluation. Surgical clips are identified in the soft tissues about the mid to distal humerus. No fracture or dislocation. Increased separation of the humeral head from the osseous glenoid and AC joint, which could represent subluxation. A large synovial effusion, hematoma, or mass cannot be excluded. CT scan of the left shoulder can be considered for further evaluation.      CT HEAD WO CONTRAST    Result Date: 3/10/2022  EXAMINATION: CT OF THE HEAD WITHOUT CONTRAST  3/10/2022 3:29 pm TECHNIQUE: CT of the head was performed without the administration of intravenous contrast. Dose modulation, iterative reconstruction, and/or weight based adjustment of the mA/kV was utilized to reduce the radiation dose to as low as reasonably achievable. COMPARISON: February 25, 2022 HISTORY: ORDERING SYSTEM PROVIDED HISTORY: AMS TECHNOLOGIST PROVIDED HISTORY: Reason for exam:->AMS Has a \"code stroke\" or \"stroke alert\" been called? ->No FINDINGS: BRAIN/VENTRICLES: There are age related cortical atrophy and periventricular white matter ischemic changes. There is no acute intracranial hemorrhage, mass effect or midline shift. No abnormal extra-axial fluid collection. The gray-white differentiation is maintained without evidence of an acute infarct. There is no evidence of hydrocephalus. ORBITS: The visualized portion of the orbits demonstrate no acute abnormality. SINUSES: There is partial opacification of bilateral mastoid air cells. Mucosal thickening in the right sphenoid sinus. SOFT TISSUES/SKULL:  No acute abnormality of the visualized skull or soft tissues. No acute intracranial abnormality or hemorrhage. Bilateral mastoiditis and right sphenoid sinusitis. IR FLUORO GUIDED CVA DEVICE PLMT/REPLACE/REMOVAL    Result Date: 3/11/2022  PROCEDURE: ULTRASOUND GUIDED VASCULAR ACCESS. FLUOROSCOPY GUIDED PICC PLACEMENT 3/11/2022. HISTORY: ORDERING SYSTEM PROVIDED HISTORY: picc line/ medline TECHNOLOGIST PROVIDED HISTORY: Reason for exam:->picc line/ medline SEDATION: None FLUOROSCOPY DOSE AND TYPE OR TIME AND EXPOSURES: Fluoroscopy time equals 8.2 minutes. Total dose equals 116 mGy TECHNIQUE: The procedure was performed under ultrasound and fluoroscopic guidance. And ultrasound image and fluoroscopic image was obtained for medical records. Sobia Hathaway FINDINGS: The patient's right arm was prepped and draped in a sterile fashion using a maximum sterile barrier technique.  Following the uneventful administration of lidocaine I introduced a micro puncture needle into the right basilic vein using ultrasound guidance. Through the micropuncture needle a microwire was introduced into the vein. Over the microwire a peel-away sheath was placed within the vein. A catheter measurement was obtained. The catheter was then trimmed to 25 cm and introduced over the wire into the right basilic vein. The catheter tip was placed within the right subclavian vein. .  The peel-away sheath was then removed and the PICC line was secured to the skin. A sterile dressing was applied. The patient tolerated the procedure well and no complications. A fluoroscopic image was obtained which confirms position of the PICC line catheter. Successful placement of a dual lumen power PICC line. The tip of the PICC line catheter was placed within the distal right subclavian vein. Sobia Hathaway IMPRESSION: Successful ultrasound and fluoroscopy guided PICC placement     XR SHOULDER LEFT (MIN 2 VIEWS)    Result Date: 3/10/2022  EXAMINATION: THREE XRAY VIEWS OF THE LEFT SHOULDER 3/10/2022 9:31 am COMPARISON: None HISTORY: ORDERING SYSTEM PROVIDED HISTORY: limited mobility and bruising TECHNOLOGIST PROVIDED HISTORY: Reason for exam:->limited mobility and bruising FINDINGS: There is no evidence of fracture. There is increased separation of the humeral head from the osseous glenoid and the acromioclavicular joint, which could represent subluxation. Otherwise, the presence of a large synovial effusion, hematoma, or mass cannot be excluded. Clinical correlation is recommended. CT scan of the left shoulder can be performed for further evaluation. The heart is enlarged. Surgical clips are identified in the soft tissues surrounding the midportion of the humerus. No fracture. Increased separation of the humeral head from the osseous glenoid and AC joint, which could represent subluxation. Otherwise, a large synovial effusion, hematoma, or mass cannot be excluded. Clinical correlation is recommended.   CT scan of the left shoulder can be performed for further evaluation. Cardiomegaly. IR ULTRASOUND GUIDANCE VASCULAR ACCESS    Result Date: 3/11/2022  PROCEDURE: ULTRASOUND GUIDED VASCULAR ACCESS. FLUOROSCOPY GUIDED PICC PLACEMENT 3/11/2022. HISTORY: ORDERING SYSTEM PROVIDED HISTORY: picc line/ medline TECHNOLOGIST PROVIDED HISTORY: Reason for exam:->picc line/ medline SEDATION: None FLUOROSCOPY DOSE AND TYPE OR TIME AND EXPOSURES: Fluoroscopy time equals 8.2 minutes. Total dose equals 116 mGy TECHNIQUE: The procedure was performed under ultrasound and fluoroscopic guidance. And ultrasound image and fluoroscopic image was obtained for medical records. [place] FINDINGS: The patient's right arm was prepped and draped in a sterile fashion using a maximum sterile barrier technique. Following the uneventful administration of lidocaine I introduced a micro puncture needle into the right basilic vein using ultrasound guidance. Through the micropuncture needle a microwire was introduced into the vein. Over the microwire a peel-away sheath was placed within the vein. A catheter measurement was obtained. The catheter was then trimmed to 25 cm and introduced over the wire into the right basilic vein. The catheter tip was placed within the right subclavian vein. .  The peel-away sheath was then removed and the PICC line was secured to the skin. A sterile dressing was applied. The patient tolerated the procedure well and no complications. A fluoroscopic image was obtained which confirms position of the PICC line catheter. Successful placement of a dual lumen power PICC line. The tip of the PICC line catheter was placed within the distal right subclavian vein. [place]  IMPRESSION: Successful ultrasound and fluoroscopy guided PICC placement     Assessment//Plan           Hospital Problems           Last Modified POA    * (Principal) Acute respiratory failure with hypoxia ([address] 3/9/2022 Yes    Kidney transplanted 3/10/2022 Yes    Elevated troponin 3/10/2022 Yes Paroxysmal atrial fibrillation (HCC) 3/10/2022 Yes    Hypotension 3/10/2022 Yes    LBBB (left bundle branch block) 3/10/2022 Yes    History of DVT (deep vein thrombosis) 3/10/2022 Yes    Chronic anticoagulation 3/10/2022 Yes    Acute respiratory failure with hypoxemia ([address] 3/11/2022 Yes        Assessment:    Condition: In stable condition. Improving. (Sepsis ? Cause aspiration pneumonia  Was on zosyn and vanc , now on  levaquin ,   Acute respiratory failure secondary to above on o2 ,   Metabolic encephalopathy in immuno compromised host , on acyclovir for possible  herpes simplex  Encephalitis   Metabolic acidosis , due to acute on chronic renal failure , creatinine better 1.8  cdiff colitis on  Flagyl   Left femoral and popliteal DVT on eliquis . S/p IVC filter placement   Acute on chronic renal failure , on  iv fluids  Creatinine better 1.8    hyperlpid , continue lipitor   Bipolar disorder on pristiq  And clozaril  Chronic renal  failure s/p renal transplant 2015 . On cellcept and cyclosporine   Hypothyroidism on levothyroxine . Hypotension hold metorpolol and lisinopril , on midodrin as needed   Delirium / metabolic encephalopathy secondary to above , unable to have oral intake , NG tube , tolerating  TF, nepro 40 cc/ hour left   Metabolic acidosis due to renal failure  start hco3 drip. Malnutrition and edema , spa and lasix x2   Anemia of chronic disease transfuse 1 unit of PRBCS . discussed with  Papito Raza at bedside . Slight improvement in the clinical status      ).        Electronically signed by Luiz Macedo MD on 3/11/22 at 8:21 PM EST

## 2022-03-17 NOTE — PROGRESS NOTES
Physical Therapy Treatment Note/Plan of Care    Room #:  6742/3238-41  Patient Name: Josh Dubin  YOB: 1955  MRN: [id number]    Date of Service: 3/17/2022     Tentative placement recommendation: Subacute rehab  Equipment recommendation:  To be determined      Evaluating Physical Therapist: Dashawn Levine, PT #[id number]      Specific Provider Orders/Date/Referring Provider :  03/09/22 2130   PT eval and treat Start: 03/09/22 2130, End: 03/09/22 2130, ONE TIME, Standing Count: 1 Occurrences, Adriana Lowe MD      Admitting Diagnosis:   Confusion [R41.0]  Acute respiratory failure with hypoxia ([address] [J96.01]  Acute respiratory failure with hypoxemia ([address] [J96.01]     altered mental status    Surgery: none  Visit Diagnoses       Codes    Confusion     R41.0    Encounter for imaging study to confirm nasogastric (NG) tube placement     Z01.89          Patient Active Problem List   Diagnosis    Peripheral vascular disease ([address]    Depression    Clotting disorder ([address]    Abnormal EKG    Cancer ([address]    Over weight    Kidney transplanted    ESRD (end stage renal disease) ([address]    Non morbid obesity due to excess calories    Hypertension    Leg swelling    Lymphedema of both lower extremities    Acute gout involving toe of right foot    Sepsis secondary to UTI ([address]    Acute kidney injury superimposed on CKD ([address]    Thyroid disease    Acute on chronic combined systolic (congestive) and diastolic (congestive) heart failure ([address]    Sepsis ([address]    Acute renal failure ([address]    CECILY (acute kidney injury) ([address]    Ischemic bowel disease ([address]    Altered mental status    Coma ([address]    Acute respiratory failure with hypoxia ([address]    Hypoxia    Acute metabolic encephalopathy    Acute deep vein thrombosis (DVT) of femoral vein of left lower extremity (HCC)    Anemia    Positive blood culture    Difficult intravenous access    Elevated troponin    Paroxysmal atrial fibrillation (HCC)    Hypotension    LBBB (left bundle branch block)    History of DVT (deep vein thrombosis)    Chronic anticoagulation    Acute respiratory failure with hypoxemia (HCC)        ASSESSMENT of Current Deficits Patient exhibits decreased strength, balance, endurance, range of motion and pain left upper extremity  impairing functional mobility, transfers, tolerance to activity and participation Patient presented with improved cognition today, eyes open and engaging in conversation. Patient able to assisted with bed mobility and sitting EOB. Patient requires continued skilled physical therapy to address concerns listed above for increased safety and function at discharge. PHYSICAL THERAPY  PLAN OF CARE       Physical therapy plan of care is established based on physician order,  patient diagnosis and clinical assessment    Current Treatment Recommendations:    -Bed Mobility: Lower extremity exercises , Upper extremity exercises  and Trunk control activities   -Sitting Balance: Hands on support to maintain midline , Facilitate active trunk muscle engagement  and Facilitate postural control in all planes   -Endurance: Utilize Supervised activities to increase level of endurance to allow for safe functional mobility including transfers and gait     PT long term treatment goals are located in below grid    Patient and or family understand(s) diagnosis, prognosis, and plan of care. Frequency of treatments: Patient will be seen  3-5 times/week.          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    Cerebrovascular disease     Clotting disorder ([facility] 1985    thrombophlebitis after c section delivery    Depression     15 years followed by dr Maykel Sweeney Hemodialysis patient [facility])     History of blood transfusion     Hyperlipidemia     Hypertension     Hypothyroidism     Leg swelling 09/03/2020    Lymphedema of both lower extremities 09/03/2020    Peripheral vascular disease ([facility]     Renal failure 11/2012    renal transplant    Thrombophlebitis     after c section delivery    Thyroid disease      Past Surgical History:   Procedure Laterality Date   [address]    one normal delivery    COLECTOMY N/A 1/15/2022    DIAGNOSTIC  LAPAROSCOPY LYSIS OF ADHESIONS performed by Fidel Damico MD at [address] CATH LAB PROCEDURE  2006    normal coronaries and 1996 normal coronaries    DIALYSIS FISTULA CREATION  march and july 2012    phase one and two patient will start dialysis when needed   [address]    transfemoral venous bypass grafts    IR IVC FILTER PLACEMENT W IMAGING  2/26/2022    IR IVC FILTER PLACEMENT W IMAGING 2/26/2022 [facility] SPECIAL PROCEDURES    KIDNEY TRANSPLANT  2016    KIDNEY TRANSPLANT  2015    KIDNEY TRANSPLANT  2015    PARATHYROIDECTOMY  2006    left parathyroid  implant and parathyroidectomy    TRANSESOPHAGEAL ECHOCARDIOGRAM N/A 2/28/2022    TRANSESOPHAGEAL ECHOCARDIOGRAM WITH BUBBLE STUDY performed by Garth Cortez MD at [address]:    Precautions: Use lift equipment for lifting patient , falls and alarm ,  hx kidney transplant 2015, contact iso-ashliediff, ng tube    Social history: Patient from  in a skilled nursing facility  Prior to multiple hospitalizations lived alone in a apartment with Elevator to enter   Tub shower grab bars    Equipment owned: Rollator and Shower chair,       [address] [facility]   How much difficulty turning over in bed?: A Lot  How much difficulty sitting down on / standing up from a chair with arms?: Unable  How much difficulty moving from lying on back to sitting on side of bed?: A Lot  How much help from another person moving to and from a bed to a chair?: Total  How much help from another person needed to walk in hospital room?: Total  How much help from another person for climbing 3-5 steps with a railing?: Total  AM-PAC Inpatient Mobility Raw Score : 8  AM-PAC Inpatient T-Scale Score : 28.52  Mobility Inpatient CMS 0-100% Score: 86.62  Mobility Inpatient CMS G-Code Modifier : CM    Nursing cleared patient for PT treatment. .   OBJECTIVE;   Initial Evaluation  Date: 3/10/2022 Treatment Date:  3/17/2022     Short Term/ Long Term   Goals   Was pt agreeable to Eval/treatment? Yes Yes To be met in 3 days   Pain level   5/10  Left upper extremity  5/10    Bed Mobility    Rolling: Dependent assist of 1    Supine to sit: Dependent of  2    Sit to supine: Dependent of  2    Scooting: Dependent of  2   Rolling: Moderate assist of  2   Supine to sit: Maximal assist of  2   Sit to supine: Maximal assist of  2   Scooting: Maximal assist of 1    Rolling: Moderate assist of  2    Supine to sit: Moderate assist of  2    Sit to supine: Moderate assist of  2    Scooting: Moderate assist of  2     Transfers Sit to stand: Not assessed    Sit to stand: Not assessed  d/t to pt overall debility, decreased activity tolerance, balance deficits, safety and fall risk.        Sit to stand: Maximal assist of  2     ROM Limited bilateral upper extremities and lower extremities    Increase range of motion 10% of affected joints    Strength BUE:  refer to OT eval  RLE:  2+/5  LLE:  2+/5  Increase strength in affected mm groups by 1/3 grade   Balance Sitting EOB:  poor  Max progressing to mod assist   Dynamic Standing:  not assessed   Sitting EOB: poor, max assist using taps    Dynamic Standing: not assessed    Sitting EOB:  fair    Dynamic Standing: fair -     Patient is Alert & Oriented x person and place and follows one step directions    Sensation:  Patient  denies numbness/tingling   Edema:  yes bilateral lower extremities and bilateral upper extremities   Endurance: fair       Vitals: 2 liters nasal cannula   Blood Pressure at rest  Blood Pressure during session    Heart Rate at r est   Heart Rate during session     SPO2 at rest %  SPO2 during session  %     Patient education  Patient educated on role of Physical Therapy, risks of immobility, safety and plan of care,  importance of mobility while in hospital , ankle pumps, quad set and glut set for edema control, blood clot prevention and positioning for skin integrity and comfort     Patient response to education:   Pt verbalized understanding Pt demonstrated skill Pt requires further education in this area   Yes Partial Yes      Treatment:  Patient practiced and was instructed/facilitated in the following treatment: Patient assisted to EOB. Sat EOB for 6 minutes for dynamic balance and increasing activity tolerance. Patient assisted back to supine. Rolled x4 reps for self hygiene. Assisted to [facility], max assist x2 in [facility]. AAROM/PROM exercises to all joints. Pt. positioned for skin integrity and edema control. Boots donned. Therapeutic Exercises: PROM ankle pumps, heel slide, hip abduction/adduction, straight leg raise, wrist flexion/extension, finger flexion/extension and grasp/relax  x 10 reps. At end of session, patient in bed with alarm call light and phone within reach,  all lines and tubes intact, nursing notified. Patient would benefit from continued skilled Physical Therapy to improve functional independence and quality of life.          Patient's/ family goals   Return to [facility]    Time in  8508  Time out 1000    Total Treatment Time 27 minutes    CPT codes:  Therapeutic activities (32518)   14 minutes  1 unit(s)  Therapeutic exercises (81704)   13 minutes  1 unit(s)    Agus Gleason, PT #[id number]

## 2022-03-17 NOTE — PROGRESS NOTES
[facility]     [address] [place] [address]  [place], [address]  Phone [phone]   [phone]      Admit Date: 3/9/2022 10:02 AM  Pt Name: Aleena Rosales  MRN: [id number]  : 1955  Reason for Consult:    Chief Complaint   Patient presents with    Altered Mental Status     from NH     Requesting Physician:  Steven Arias MD  PCP: Eric Briceño MD  History Obtained From:  patient, chart   ID consulted for Confusion [R41.0]  Acute respiratory failure with hypoxia ([address] [J96.01]  Acute respiratory failure with hypoxemia ([address] [J96.01]  on hospital day [address] [facility]       Chief Complaint   Patient presents with    Altered Mental Status     from NH     HISTORYOF PRESENT ILLNESS   Aleena Rosales is a 73 y.o. female who presents with   has a past medical history of Abnormal EKG, Acute gout involving toe of right foot, Acute on chronic combined systolic (congestive) and diastolic (congestive) heart failure ([address] Cancer ([address] Cerebrovascular disease, Clotting disorder ([address] Depression, Hemodialysis patient ([address] History of blood transfusion, Hyperlipidemia, Hypertension, Hypothyroidism, Leg swelling, Lymphedema of both lower extremities, Peripheral vascular disease ([address] Renal failure, Thrombophlebitis, and Thyroid disease. Altered Mental Status      Know to ID   · Last seen on 3/2/2022-d/c on Augmentin cover poss aspiration and colitis  Pt came in due to altered mental status. Wbc24.7 hgb10.8 qwg732 cr1.6 TMAX99.3  PT WAS SEEN AND EXAMINED WITH NURSES PRESENT  PT IS NOT AROUSABLE UNABLE TO OBTAIN HISTORY    PT HAS SOFT STOOLS SOME ODOR  SACRAL AREA VIEWED HAS SMALL DTI AREA  CXRY 1. Right perihilar discoid atelectasis. 2. There are no findings of failure or pneumonia.    LEFT SHOULDER No fracture.  Increased separation of the humeral head from the osseous   glenoid and AC joint, which could represent subluxation.  Otherwise, a large   synovial effusion, hematoma, or mass cannot be excluded.  Clinical   correlation is recommended.  CT scan of the left shoulder can be performed   for further evaluation.  Cardiomegaly. LEFT HUMERUS No fracture or dislocation.  Increased separation of the humeral head from   the osseous glenoid and AC joint, which could represent subluxation.  A large   synovial effusion, hematoma, or mass cannot be excluded.  CT scan of the left   shoulder can be considered for further evaluation. piperacillin-tazobactam (ZOSYN) 3,375 mg in dextrose 5 % 50 mL IVPB extended infusion (mini-bag), Q8H  vancomycin (VANCOCIN) oral solution 125 mg, 4 times per day  cycloSPORINE (SANDIMMUNE) capsule 100 mg, BID  mycophenolate (CELLCEPT) capsule 500 mg, BID  metoprolol succinate (TOPROL XL) extended release tablet 50 mg, Daily  predniSONE (DELTASONE) tablet 5 mg, Daily     LAST BLOOD CX  cons   CLEMENCIA  Summary   Technically difficult study. No definate evidence of vegetation consistent with endocarditis. Normal LV segmental wall motion. Ejection fraction is visually estimated at 60 to 65%. Normal right ventricular size and function.     DOS  22   Much more awake today conversing working with pt    3/16/2022  Son present and updated questions answered  Pt in bed has ngt briefly arouses    3/15/2022  In bed responds a little has ngt   Nurse/aide present    3/14/2022  In bed more awake has ngt  afebrile 2L cr2.95 wbc4.7     3/13/2022  In bed briefly arouses   Does not stay awake   Spoke with nurse unable to give meds    3/12/2022  briefly arouses has no abd pain    3/11/2022  In bed open eyes briefly still lethargic  cdiff +  REVIEW OF SYSTEMS     CONSTITUTIONAL:   AS IN HPI     Medications Prior to Admission: pantoprazole (PROTONIX) 40 MG tablet, Take 1 tablet by mouth every morning (before breakfast)  [] amoxicillin-clavulanate (AUGMENTIN) 875-125 MG per tablet, Take 1 tablet by mouth every 12 hours for 10 days  apixaban (ELIQUIS) 5 MG TABS tablet, Take 5 mg by mouth 2 times daily Take in the morning and at bedtime for 3 months (started 2/19/2022)  atorvastatin (LIPITOR) 10 MG tablet, Take 10 mg by mouth daily  lisinopril (PRINIVIL;ZESTRIL) 5 MG tablet, take 1 tablet by mouth once daily  pregabalin (LYRICA) 75 MG capsule,   nystatin (MYCOSTATIN) 005986 UNIT/GM powder, Apply 3 times daily. ipratropium-albuterol (DUONEB) 0.5-2.5 (3) MG/3ML SOLN nebulizer solution, Inhale 3 mLs into the lungs every 6 hours as needed for Shortness of Breath  ondansetron (ZOFRAN-ODT) 4 MG disintegrating tablet, Take 1 tablet by mouth every 8 hours as needed for Nausea or Vomiting  allopurinol (ZYLOPRIM) 100 MG tablet, Take 100 mg by mouth daily  Cyanocobalamin (B-12 COMPLIANCE INJECTION) 1000 MCG/ML KIT, Inject as directed monthly  metoprolol succinate (TOPROL XL) 50 MG extended release tablet, take 1 tablet by mouth once daily  desvenlafaxine succinate (PRISTIQ) 50 MG TB24 extended release tablet, Take 1 tablet by mouth every evening  cloZAPine (CLOZARIL) 50 MG tablet, Take 50 mg by mouth nightly  cycloSPORINE (SANDIMMUNE) 100 MG capsule, Take 100 mg by mouth 2 times daily  predniSONE (DELTASONE) 5 MG tablet, Take 5 mg by mouth daily  folic acid (FOLVITE) 1 MG tablet, Take 1 mg by mouth daily  docusate sodium (COLACE) 100 MG capsule, Take 100 mg by mouth 2 times daily  mycophenolate (CELLCEPT) 500 MG tablet, Take 1,000 mg by mouth 2 times daily  levothyroxine (SYNTHROID) 50 MCG tablet, Take 50 mcg by mouth daily.     budesonide (PULMICORT) 0.5 MG/2ML nebulizer suspension, Take 2 mLs by nebulization 2 times daily for 10 days  CURRENT MEDICATIONS     Current Facility-Administered Medications:     potassium chloride (KLOR-CON M) extended release tablet 40 mEq, 40 mEq, Oral, BID WC, Nina Quiñones MD    sodium bicarbonate tablet 650 mg, 650 mg, Oral, Daily, Nina Quiñones MD, 650 mg at 03/17/22 1045    cycloSPORINE (SANDIMMUNE) 100 MG/ML solution 100 mg, 100 mg, Per NG tube, BID, Susannah Ramirez MD    0.9 % sodium chloride infusion, , IntraVENous, PRN, Kunal Ramirez MD    albumin human 25 % IV solution 25 g, 25 g, IntraVENous, BID, Maricel Vaca MD, Last Rate: 100 mL/hr at 03/17/22 1039, 25 g at 03/17/22 1039    venlafaxine (EFFEXOR) tablet 37.5 mg, 37.5 mg, Oral, BID WC, Susannah Ramirez MD, 37.5 mg at 03/17/22 1031    epoetin casey-epbx (RETACRIT) injection 2,000 Units, 2,000 Units, SubCUTAneous, Q7 Days, 2,000 Units at 03/16/22 2316 **AND** epoetin casey-epbx (RETACRIT) injection 3,000 Units, 3,000 Units, SubCUTAneous, Q7 Days, Maricel Vaca MD, 3,000 Units at 03/16/22 2314    mycophenolate (CELLCEPT) 200 MG/ML suspension 500 mg, 500 mg, Oral, BID, Kunal Ramirez MD, 500 mg at 03/16/22 2313    magnesium oxide (MAG-OX) tablet 400 mg, 400 mg, Oral, Daily, Sourav Griffin MD, 400 mg at 03/17/22 1031    potassium chloride (KLOR-CON M) extended release tablet 20 mEq, 20 mEq, Oral, Once, Aldona Habermann, MD    docusate (COLACE) 50 MG/5ML liquid 100 mg, 100 mg, Per NG tube, BID, Aldona Habermann, MD, 100 mg at 03/17/22 1042    ipratropium-albuterol (DUONEB) nebulizer solution 1 ampule, 1 ampule, Inhalation, Q4H, Ariana Macedo MD, 1 ampule at 03/17/22 0620    acyclovir (ZOVIRAX) 350 mg in dextrose 5 % 50 mL IVPB, 5 mg/kg (Adjusted), IntraVENous, Q12H, Zach Vazquez MD, Stopped at 03/16/22 2318    midodrine (PROAMATINE) tablet 5 mg, 5 mg, Oral, BID PRN, Aldona Habermann, MD    lidocaine 1 % injection 5 mL, 5 mL, IntraDERmal, Once, Aldona Habermann, MD    sodium chloride flush 0.9 % injection 5-40 mL, 5-40 mL, IntraVENous, 2 times per day, Aldona Habermann, MD, 10 mL at 03/17/22 1046    sodium chloride flush 0.9 % injection 5-40 mL, 5-40 mL, IntraVENous, PRN, Susannah Ramirez MD, 10 mL at 03/16/22 1812    0.9 % sodium chloride infusion, 25 mL, IntraVENous, PRN, Kunal Ramirez MD    heparin flush 100 UNIT/ML injection 100 Units, 1 mL, IntraVENous, 2 times per day, Shaye Mckeon MD, 100 Units at 03/17/22 1028    heparin flush 100 UNIT/ML injection 100 Units, 1 mL, IntraCATHeter, PRN, Shaye Mckeon MD    [Held by provider] metoprolol tartrate (LOPRESSOR) tablet 12.5 mg, 12.5 mg, Oral, BID, Helio Garibay MD    Fidaxomicin (DIFICID) tablet 200 mg, 200 mg, Oral, BID, Rupal Mario MD, 200 mg at 03/17/22 1106    levoFLOXacin (LEVAQUIN) 750 MG/150ML infusion 750 mg, 750 mg, IntraVENous, Q48H, Rupal Mario MD, Stopped at 03/15/22 1800    apixaban (ELIQUIS) tablet 5 mg, 5 mg, Oral, BID, Susannah Ramirez MD, 5 mg at 03/17/22 1031    ondansetron (ZOFRAN-ODT) disintegrating tablet 4 mg, 4 mg, Oral, Q8H PRN, Shaye Mckeon MD    atorvastatin (LIPITOR) tablet 10 mg, 10 mg, Oral, Nightly, Susannah Ramirez MD, 10 mg at 03/16/22 2223    cloZAPine (CLOZARIL) tablet 50 mg, 50 mg, Oral, Nightly, Susannah Ramirez MD, 50 mg at 03/16/22 2228    levothyroxine (SYNTHROID) tablet 50 mcg, 50 mcg, Oral, Daily, Shaye Mckeon MD, 50 mcg at 03/17/22 7180    pantoprazole (PROTONIX) tablet 40 mg, 40 mg, Oral, QAM AC, Susannah Ramirez MD, 40 mg at 03/17/22 6961    predniSONE (DELTASONE) tablet 5 mg, 5 mg, Oral, Daily, Shaye Mckeon MD, 5 mg at 03/17/22 1044    allopurinol (ZYLOPRIM) tablet 100 mg, 100 mg, Oral, Daily, Susannah Rmairez MD, 100 mg at 83/04/67 8761    folic acid (FOLVITE) tablet 1 mg, 1 mg, Oral, Daily, Shaye Mckeon MD, 1 mg at 03/17/22 1043  ALLERGIES     Macrodantin [nitrofurantoin macrocrystal] and Sulfa antibiotics  Immunization History   Administered Date(s) Administered    COVID-19, Pfizer Purple top, DILUTE for use, 12+ yrs, 30mcg/0.3mL dose 01/28/2021, 02/18/2021, 08/18/2021      Internal Administration   First Dose COVID-19, Pfizer Purple top, DILUTE for use, 12+ yrs, 30mcg/0.3mL dose  01/28/2021   Second Dose COVID-19, Pfizer Purple top, DILUTE for use, 12+ yrs, 30mcg/0.3mL dose   02/18/2021       Last COVID Lab SARS-CoV-2 (no units)   Date Value   2021 Not Detected     SARS-CoV-2 Antibody, Total (no units)   Date Value   2022 Non-Reactive     SARS-CoV-2, PCR (no units)   Date Value   2022 Not Detected     SARS-CoV-2, NAAT (no units)   Date Value   2022 Not Detected          PAST MEDICAL HISTORY     Past Medical History:   Diagnosis Date    Abnormal EKG     left bundle branch block    Acute gout involving toe of right foot 2020    Acute on chronic combined systolic (congestive) and diastolic (congestive) heart failure (HCC)     Cancer (HCC)     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    Cerebrovascular disease     Clotting disorder ([address] 1985    thrombophlebitis after c section delivery    Depression     15 years followed by dr Peace Crouch Hemod[facility] patient [facility])     History of blood transfusion     Hyperlipidemia     Hypertension     Hypothyroidism     Leg swelling 2020    Lymphedema of both lower extremities 2020    Peripheral vascular disease ([facility]     Renal failure 2012    renal transplant    Thrombophlebitis     after c section delivery    Thyroid disease      SURGICAL HISTORY       Past Surgical History:   Procedure Laterality Date     SECTION  1985    one normal delivery    COLECTOMY N/A 1/15/2022    DIAGNOSTIC  LAPAROSCOPY LYSIS OF ADHESIONS performed by Nathan Adamson MD at [address] [place] [address] Drive CATH LAB PROCEDURE      normal coronaries and  normal coronaries    DIALYSIS FISTULA CREATION  march and 2012    phase one and two patient will start dialysis when needed   [address]    transfemoral venous bypass grafts    IR IVC FILTER PLACEMENT W IMAGING  2022    IR IVC FILTER PLACEMENT W IMAGING 2022 [facility]ED[facility]    KIDNEY TRANSPLANT  2016    KIDNEY TRANSPLANT  2015    KIDNEY TRANSPLANT  2015    PARATHYROIDECTOMY  2006    left parathyroid  implant and parathyroidectomy    TRANSESOPHAGEAL ECHOCARDIOGRAM N/A 2/28/2022    TRANSESOPHAGEAL ECHOCARDIOGRAM WITH BUBBLE STUDY performed by Keenan Martínez MD at [facility] 23     ·  [address]:    03/16/22 2200 03/16/22 2213 03/17/22 0622 03/17/22 0756   BP:  136/72  133/61   Pulse:  92  97   Resp:  18  18   Temp:  97.6 °F (36.4 °C)  97.5 °F (36.4 °C)   TempSrc:  Oral  Axillary   SpO2: 95% 95% 93% 97%   Weight:       Height:         Physical Exam   Constitutional/General:  In bed getting cleaned up   Head: NC/AT    Pulmonary: . ON O2   Cardiovascular:  Regular rate and rhythm  Abdomen:   distension. ngt  Extremities:  Warm and well perfused dec rom   Skin: Warm and dry  No rash   Neurologic:     Awake and alert  MERCHANT   3/11 midline        DIAGNOSTIC RESULTS   RADIOLOGY:   ECHO Transesophageal    Result Date: 3/1/2022  Transesophageal Echocardiography Report (CLEMENCIA)   Demographics   Patient Name      Arn Query       Gender              Female                    [address] [facility] Record    [id number]           Room Number         0086  Number   Account #         [de-identified]          Procedure Date      02/28/2022   Corporate ID                         Ordering Physician  General William OSUNA   Accession Number  [id number]         Referring Physician   Date of Birth     1955         Sonographer         Jack Bhatti                                                           [facility]   Age               73 year(s)         Interpreting        General William OSUNA                                       Physician                                        Any Other  Procedure Type of Study   CLEMENCIA procedure:Transesophageal Echo (CLEMENCIA), Color Flow Velocity Mapping. Procedure Date Date: 02/28/2022 Start: 11:00 AM Study Location: Portable Technical Quality: Adequate visualization Indications:R/O Endocarditis.  Patient Status: Routine Height: 62 inches Weight: 220 pounds BSA: 1.99 m^2 BMI: 40.24 kg/m^2 BP: 138/79 mmHg CLEMENCIA Performed By: the attending and the sonographer  Type of Anesthesia: Moderate sedation   Findings   Left Ventricle  Normal LV segmental wall motion. Ejection fraction is visually estimated at 60 to 65%. Right Ventricle  Normal right ventricular size and function. Left Atrium  No evidence of thrombus within left atrium. Right Atrium  Normal right atrium size. Agitated saline injected for shunt evaluation shows no shunt. Mitral Valve  No vegetation. Mild mitral regurgitation is present. Tricuspid Valve  No vegetation. Aortic Valve  No vegetation. The aortic valve appears mildly sclerotic. Pulmonic Valve  No vegetation. Conclusions   Summary  Technically difficult study. No definate evidence of vegetation consistent with endocarditis. Normal LV segmental wall motion. Ejection fraction is visually estimated at 60 to 65%. Normal right ventricular size and function. Signature   ----------------------------------------------------------------  Electronically signed by Piter Landon MD(Interpreting  physician) on 03/01/2022 09:11 AM  ----------------------------------------------------------------  [email or web address] [id number]    CT HEAD WO CONTRAST    Result Date: 2/25/2022  EXAMINATION: CT OF THE HEAD WITHOUT CONTRAST  2/25/2022 3:08 am TECHNIQUE: CT of the head was performed without the administration of intravenous contrast. Dose modulation, iterative reconstruction, and/or weight based adjustment of the mA/kV was utilized to reduce the radiation dose to as low as reasonably achievable. COMPARISON: Correlation with MRI dated 01/16/2022 HISTORY: ORDERING SYSTEM PROVIDED HISTORY: mental status change TECHNOLOGIST PROVIDED HISTORY: Has a \"code stroke\" or \"stroke alert\" been called? ->No Reason for exam:->mental status change Decision Support Exception - unselect if not a suspected or confirmed emergency medical condition->Emergency Medical Condition (MA) FINDINGS: BRAIN/VENTRICLES: There is no acute intracranial hemorrhage, mass effect or midline shift. No abnormal extra-axial fluid collection. The gray-white differentiation is maintained without evidence of an acute infarct. There is no evidence of hydrocephalus. Minimal cortical volume loss. Scattered vascular calcifications. ORBITS: The visualized portion of the orbits demonstrate no acute abnormality. SINUSES: Significant opacification of the left greater than right mastoid air cells similar to previous. Mild mucosal thickening in the left sphenoid sinus. This was also present previously. SOFT TISSUES/SKULL:  No acute abnormality of the visualized skull or soft tissues. No acute intracranial abnormality. MRI would be useful if symptoms persist. Inflammatory changes of left greater than right mastoid air cells and left sphenoid sinus similar to prior MRI. RECOMMENDATIONS: Unavailable     NM LUNG VENT/PERFUSION (VQ)    Result Date: 2/25/2022  EXAMINATION: NUCLEAR MEDICINE VENTILATION PERFUSION SCAN. 2/25/2022 TECHNIQUE: 3.5 millicuries aerosolized Tc99m DTPA was administered via mask prior to planar imaging of the lungs in multiple projections. Then, 4.2 millicuries of Tc 46T MAA was administered intravenously prior to planar imaging of the lungs in similar projections. COMPARISON: Chest CT February 25, 2022 . HISTORY: ORDERING SYSTEM PROVIDED HISTORY: R/O PE TECHNOLOGIST PROVIDED HISTORY: Reason for exam:->R/O PE What reading provider will be dictating this exam?->CRC FINDINGS: PERFUSION: Distribution of radiotracer is homogeneous. No segmental defects identified. VENTILATION: Ventilation images are unremarkable. CHEST CT: Small infiltrate or atelectasis posterior right lung. Negative for pulmonary embolus.      CT ABDOMEN PELVIS W IV CONTRAST Additional Contrast? None    Result Date: 2/25/2022  EXAMINATION: CT OF THE ABDOMEN AND PELVIS WITH CONTRAST 2/25/2022 3:08 am TECHNIQUE: CT of the abdomen and pelvis was performed with the administration of intravenous contrast. Multiplanar reformatted images are provided for review. Dose modulation, iterative reconstruction, and/or weight based adjustment of the mA/kV was utilized to reduce the radiation dose to as low as reasonably achievable. COMPARISON: 01/17/2022 HISTORY: ORDERING SYSTEM PROVIDED HISTORY: Abdominal distention, AMS TECHNOLOGIST PROVIDED HISTORY: Reason for exam:->Abdominal distention, AMS Additional Contrast?->None Decision Support Exception - unselect if not a suspected or confirmed emergency medical condition->Emergency Medical Condition (MA) FINDINGS: Many images are partially degraded by patient motion related artifact. Low-attenuation focus in the central aspect of the left lobe of the liver as well as a tiny low-attenuation focus in the caudate lobe. There is also a tiny low-attenuation focus in the extreme inferior aspect of the right lobe. These are difficult to definitively characterize due to their small size but likely represent small hepatic cysts. Gallbladder is unremarkable. Spleen is normal in size. No evidence of acute pancreatitis. There is a E small exophytic cystic appearing lesion along the anterior aspect of the body of the pancreas measuring 7 Hounsfield units and 1.4 cm. This is unchanged dating back to 11/17/2021. This is also favored to be incidental but could be followed. No adrenal mass. No hydronephrosis. The native kidneys remains significantly atrophic and contain numerous cystic lesions. Some of these are hyperdense but also unchanged dating back to the 11/17/2021 exam and favored to represent hyperdense or hemorrhagic cysts. Small renal cortical calcifications and or nonobstructing stones. A right lower quadrant renal transplant demonstrates no hydronephrosis. Small hiatal hernia.   Mild fluid distention of a few small bowel loops in the upper abdomen. No bowel obstruction. The cecum is mobile. The appendix is not identified with certainty. There is some localized thickening of the proximal ascending colon. This does appear to be new compared to the previous examination. Moderate stool in the distal colon. Residual contrast in the colon likely due to contrast administered on the prior examination. Uterus is atrophic. Urinary bladder is largely decompressed with Chin catheter. Minimal gas in the bladder likely due to the catheter. Partial visualization of femoral to femoral bypass graft. Degenerative changes are present in the spine and pelvis. Please see separate dictated report for CT of the chest.     Thickening of the proximal ascending colon. Given the short-term change, this is favored to represent a localized area of colitis likely infectious or inflammatory. Neoplasia thought less likely but follow-up to resolution would be recommended. Moderate stool in the distal colon. Minimal distention of several proximal small bowel loops likely incidental or due to mild enteritis. Small hiatal hernia. Other chronic appearing findings. RECOMMENDATIONS: Unavailable     IR GUIDED IVC FILTER PLACEMENT    Result Date: 2/26/2022  EXAMINATION: INFERIOR VENA CAVA (IVC) FILTER INSERTION 2/26/2022 Procedural Personnel: Robby Ly MD HISTORY: Indication: Bleeding with IV heparin ORDERING SYSTEM PROVIDED HISTORY: IVC filter TECHNOLOGIST PROVIDED HISTORY: Reason for exam:->IVC filter Anticoagulation contraindicated SEDATION: None CONTRAST: Contrast agent: Isovue 320 Contrast volume: 30mL FLUOROSCOPY DOSE AND TYPE OR TIME AND EXPOSURES: Fluoroscopy time/Number of Images: Fluoroscopy time 1.5 minutes. Reference air [place] kerma: [id number] PROCEDURE: Informed consent for the procedure including risks, benefits and alternatives was obtained and time-out was performed prior to the procedure. Universal protocol was followed.  A suitable skin site was prepared and draped using all elements of maximal sterile barrier technique including sterile gloves, sterile gown, cap, mask, large sterile sheet, sterile ultrasound probe cover, hand hygiene, and cutaneous antisepsis. Time-out was called and the patient's order and identity were verified. Local anesthesia was administered. The vessel was sonographically evaluated and judged appropriate for access. Real time ultrasound was used to visualize needle entry into the vessel and an ultrasound image was stored in PACS. Vein accessed: Right common femoral vein. Access technique: Micropuncture set with 21 gauge needle A 0.035 guide wire was used to place a catheter into the inferior vena cava. A vena cavogram was performed. A Bard [place] filter was deployed in routine fashion in the infrarenal IVC under fluoroscopic guidance. The sheath was removed and hemostasis was achieved with manual compression. A sterile dressing was applied. Patient tolerated procedure well. Complications: No immediate complications. Standardized report: [id number] [id number] FINDINGS: US: The access vein is patent. Inferior Vena Cavogram: The vena cava is patent and normal in size without thrombus or anomalies. Post-placement imaging: Spot filmdemonstrates the filter in the infrarenalvena cava with less than 15 degrees tilt from the vena cava access. Successful vena cava filter placement. PLAN: Retrieval intent (MIPS): The filter is potentially retrievable. Plan/Timing For Retrieval: Ordering Physician/Contact For Retrieval Plan: Alyson Webster     XR CHEST PORTABLE    Result Date: 3/9/2022  EXAMINATION: ONE XRAY VIEW OF THE CHEST 3/9/2022 10:28 am COMPARISON: 02/25/2022 HISTORY: ORDERING SYSTEM PROVIDED HISTORY: ams TECHNOLOGIST PROVIDED HISTORY: Reason for exam:->ams FINDINGS: The cardiac silhouette is at upper limits of normal in size.   There are no findings of failure There is a linear focus seen within the right perihilar region favored to represent atelectasis. There is no focal consolidation seen within the right or left lung to suggest pneumonia. There is no pleural effusion. 1. Right perihilar discoid atelectasis. 2. There are no findings of failure or pneumonia. XR CHEST 1 VIEW    Result Date: 2/25/2022  EXAMINATION: ONE XRAY VIEW OF THE CHEST 2/25/2022 2:49 pm COMPARISON: None. HISTORY: ORDERING SYSTEM PROVIDED HISTORY: needs to be done with VQ scan TECHNOLOGIST PROVIDED HISTORY: Last chest Xray done more than 24 hours ago Reason for exam:->needs to be done with VQ scan FINDINGS: The heart is mildly enlarged. There are no findings of failure. The lungs are clear. There is no focal infiltrate or effusion. .     1. Mild cardiomegaly. There is no evidence of failure or pneumonia. CTA PULMONARY W CONTRAST    Result Date: 2/25/2022  EXAMINATION: CTA OF THE CHEST 2/25/2022 3:08 am TECHNIQUE: CTA of the chest was performed after the administration of intravenous contrast.  Multiplanar reformatted images are provided for review. MIP images are provided for review. Dose modulation, iterative reconstruction, and/or weight based adjustment of the mA/kV was utilized to reduce the radiation dose to as low as reasonably achievable. COMPARISON: Portable chest dated 01/22/2022 and CT dated 01/17/2022 HISTORY: ORDERING SYSTEM PROVIDED HISTORY: Hypoxic, SOB TECHNOLOGIST PROVIDED HISTORY: Reason for exam:->Hypoxic, SOB Decision Support Exception - unselect if not a suspected or confirmed emergency medical condition->Emergency Medical Condition (MA) FINDINGS: Pulmonary Arteries: Assessment is limited due to patient motion artifact. A small or peripheral embolism would be difficult to exclude but no large or central embolism identified. Mediastinum: Moderately severe cardiomegaly is similar to previous. No pericardial effusion. No aortic dissection. Scattered vascular calcifications.   Normal-size lymph nodes without adenopathy by size criteria. Lungs/pleura: No pneumothorax. Mild atelectasis in the right greater than left lung base. Otherwise, there has been significant improvement in aeration of the lungs since previous. Upper Abdomen: Partial visualization of the upper right kidney with cystic appearing foci as well as a partially visualized hyperdense lesion likely representing a hyperdense proteinaceous cyst and similar to previous but continued follow-up would be useful. Visualized portions of the upper abdomen demonstrate no acute abnormality. Small hiatal hernia. Soft Tissues/Bones: Degenerative changes are scattered in the spine. Allowing for patient motion artifact, no identified pulmonary embolism. Significant improvement in aeration of the lungs with some residual areas of presumed atelectasis in the right greater than left base. Cardiomegaly. RECOMMENDATIONS: Unavailable     US DUP UPPER EXTREMITY LEFT VENOUS    Result Date: 2/27/2022  EXAMINATION: VENOUS ULTRASOUND OF THE LEFT UPPER EXTREMITY, 2/27/2022 12:55 pm TECHNIQUE: Duplex ultrasound using B-mode/gray scaled imaging and Doppler spectral analysis and color flow was obtained of the deep venous structures of the left upper extremity. COMPARISON: None. HISTORY: ORDERING SYSTEM PROVIDED HISTORY: left arm edema TECHNOLOGIST PROVIDED HISTORY: Portable please if possible Reason for exam:->left arm edema What reading provider will be dictating this exam?->CRC FINDINGS: There is normal flow and compressibility of the visualized venous structures. There is no evidence of echogenic thrombus. The veins demonstrate good compressibility with normal color flow study and spectral analysis. The AV fistula is patent. No evidence of DVT.      IR ULTRASOUND GUIDANCE VASCULAR ACCESS    Result Date: 2/26/2022  EXAMINATION: INFERIOR VENA CAVA (IVC) FILTER INSERTION 2/26/2022 Procedural Personnel: Bill Capps MD HISTORY: Indication: Bleeding with IV heparin ORDERING SYSTEM PROVIDED HISTORY: IVC filter TECHNOLOGIST PROVIDED HISTORY: Reason for exam:->IVC filter Anticoagulation contraindicated SEDATION: None CONTRAST: Contrast agent: Isovue 320 Contrast volume: 30mL FLUOROSCOPY DOSE AND TYPE OR TIME AND EXPOSURES: Fluoroscopy time/Number of Images: Fluoroscopy time 1.5 minutes. Reference air [place] kerma: [id number] PROCEDURE: Informed consent for the procedure including risks, benefits and alternatives was obtained and time-out was performed prior to the procedure. Universal protocol was followed. A suitable skin site was prepared and draped using all elements of maximal sterile barrier technique including sterile gloves, sterile gown, cap, mask, large sterile sheet, sterile ultrasound probe cover, hand hygiene, and cutaneous antisepsis. Time-out was called and the patient's order and identity were verified. Local anesthesia was administered. The vessel was sonographically evaluated and judged appropriate for access. Real time ultrasound was used to visualize needle entry into the vessel and an ultrasound image was stored in PACS. Vein accessed: Right common femoral vein. Access technique: Micropuncture set with 21 gauge needle A 0.035 guide wire was used to place a catheter into the inferior vena cava. A vena cavogram was performed. A [employer] [place] filter was deployed in routine fashion in the infrarenal IVC under fluoroscopic guidance. The sheath was removed and hemostasis was achieved with manual compression. A sterile dressing was applied. Patient tolerated procedure well. Complications: No immediate complications. Standardized report: [id number] [id number] FINDINGS: US: The access vein is patent. Inferior Vena Cavogram: The vena cava is patent and normal in size without thrombus or anomalies. Post-placement imaging: Spot filmdemonstrates the filter in the infrarenalvena cava with less than 15 degrees tilt from the vena cava access.      Successful vena cava filter placement. PLAN: Retrieval intent (MIPS): The filter is potentially retrievable. Plan/Timing For Retrieval: Ordering Physician/Contact For Retrieval Plan: Jaci GUERRIER MODIFIED BARIUM SWALLOW W VIDEO    Result Date: 2/26/2022  EXAMINATION: MODIFIED BARIUM SWALLOW WAS PERFORMED IN CONJUNCTION WITH SPEECH PATHOLOGY SERVICES TECHNIQUE: Fluoroscopic evaluation of the swallowing mechanism was performed using cineradiography with multiple consistency of barium product in conjunction with speech pathology services. FLUOROSCOPY DOSE AND TYPE OR TIME AND EXPOSURES: Fluoroscopy time 2.1 minutes. Air kerma 5.49 mGy COMPARISON: None HISTORY: ORDERING SYSTEM PROVIDED HISTORY: dysphagia, aspriation pna? TECHNOLOGIST PROVIDED HISTORY: Reason for exam:->dysphagia, aspriation pna? FINDINGS: There was oral delay and pharyngeal delay. Laryngeal elevation was adequate. There was retention in the piriform sinuses but not the vallecula. Transient laryngeal penetration is seen with thin liquid barium. Otherwise, no aspiration or laryngeal penetration is seen. Strands it laryngeal penetration with thin liquid barium. No evidence of aspiration. Please see separate speech pathology report for full discussion of findings and recommendations. RECOMMENDATIONS: Unavailable     US DUP LOWER EXTREMITIES BILATERAL VENOUS    Result Date: 2/25/2022  EXAMINATION: DUPLEX VENOUS ULTRASOUND OF THE BILATERAL LOWER EXTREMITIES2/25/2022 2:47 pm TECHNIQUE: Duplex ultrasound using B-mode/gray scaled imaging, Doppler spectral analysis and color flow Doppler was obtained of the deep venous structures of the lower bilateral extremities. COMPARISON: None.  HISTORY: ORDERING SYSTEM PROVIDED HISTORY: R/O DVT TECHNOLOGIST PROVIDED HISTORY: Reason for exam:->R/O DVT What reading provider will be dictating this exam?->CRC FINDINGS: Right lower extremity:  The visualized veins of the bilateral lower extremities are patent and free of echogenic thrombus. The veins demonstrate good compressibility with normal color flow study and spectral analysis. Left lower extremity: There are incompletely occlusive thrombi within the mid and distal left femoral vein and the popliteal vein. They have developed in the interim since the prior lower extremity Doppler from 11/17/2021.     1. INCOMPLETELY OCCLUSIVE THROMBI in the mid and distal LEFT femoral vein and within the popliteal vein. They have developed in the interim since the previous study from 11/17/2021. 2. No evidence of DVT in the right lower extremity. RECOMMENDATIONS: Unavailable     LABS  Recent Labs     03/15/22  0520 03/15/22  0520 03/16/22  0630 03/16/22  2345 03/17/22  0615   WBC 4.8  --  4.4*  --  7.5   HGB 7.9*   < > 7.1* 8.8* 9.1*   HCT 27.4*   < > 23.6* 28.8* 30.4*   .7*  --  107.8*  --  103.1*     --  219  --  189    < > = values in this interval not displayed. Recent Labs     03/15/22  0520 03/15/22  0520 03/16/22  0908 03/16/22  0908 03/17/22  0616     --  145  --  144   K 3.9   < > 3.5   < > 3.2*   *   < > 113*   < > 112*   CO2 17*   < > 22   < > 24   BUN 36*  --  31*  --  32*   CREATININE 2.3*  --  1.8*  --  1.5*   GFRAA 26  --  34  --  42   LABGLOM 21  --  28  --  35   GLUCOSE 118*  --  97  --  134*   PROT 3.9*  --  4.1*  --  4.5*   LABALBU 1.9*  --  2.4*  --  2.8*   CALCIUM 8.6  --  9.2  --  10.2   BILITOT 0.4  --  0.2  --  0.5   ALKPHOS 61  --  48  --  58   AST 23  --  20  --  19   ALT 17  --  17  --  17    < > = values in this interval not displayed. No results for input(s): PROCAL in the last 72 hours.   Lab Results   Component Value Date    CRP 18.3 (H) 01/23/2022    CRP 34.6 (H) 11/17/2021     Lab Results   Component Value Date    SEDRATE 61 (H) 01/23/2022    SEDRATE 62 (H) 11/20/2021     No results found for: [id number]  Lab Results   Component Value Date    COVID19 Not Detected 03/02/2022    COVID19 Not Detected 02/25/2022     COVID-19/MARINA-COV2 LABS  Recent Labs     03/15/22  0520 03/16/22  0908 03/17/22  0616   AST 23 20 19   ALT 17 17 17      MICROBIOLOGY:          Lab Results   Component Value Date    BC 5 Days no growth 03/09/2022    BC 5 Days no growth 02/27/2022    BC  02/25/2022     This organism was isolated in one set. Susceptibility testing is not routinely done as this  organism frequently represents skin contamination. Additional testing can be ordered by calling the  Microbiology Department.       ORG Pseudomonas aeruginosa 03/09/2022    ORG Staphylococcus coagulase-negative 02/25/2022    ORG Enterococcus faecalis 01/12/2022     Lab Results   Component Value Date    BLOODCULT2 5 Days no growth 03/09/2022    BLOODCULT2 5 Days no growth 02/25/2022    BLOODCULT2 5 Days no growth 01/24/2022    ORG Pseudomonas aeruginosa 03/09/2022    ORG Staphylococcus coagulase-negative 02/25/2022    ORG Enterococcus faecalis 01/12/2022       WOUND/ABSCESS   Date Value Ref Range Status   01/24/2022 Growth not present  Final        Urine Culture, Routine   Date Value Ref Range Status   03/09/2022 75,000 CFU/ml  Final   02/26/2022 Growth not present  Final   01/23/2022 Growth not present  Final     MRSA Culture Only   Date Value Ref Range Status   11/17/2021 Methicillin resistant Staph aureus not isolated  Final          FINAL IMPRESSION    Patient is a 73 y.o. female who presented with   Chief Complaint   Patient presents with    Altered Mental Status     from NH    and admitted for Confusion [R41.0]  Acute respiratory failure with hypoxia ([address] [J96.01]  Acute respiratory failure with hypoxemia ([facility]) [J96.01]     PT WITH ENCEPHALOPTHAY METABOLIC VS INFECTIOUS ETIOLOGY -improved   H/O hsv IG G + day 8  S/P RX ASPIRATION PNEUMONIA/COLITIS  H/O COVID  KIDNEY TRANSPLANT ON IMMUNOSUPPRESSIVE  Poss UTI Pseudomonas  On levaquin day 4  MASTOIDITIS/SINUSITIS  CIDFF + on dificid day6/10,   S/p  flagyl IV day 4 will stopped - improved MS         acyclovir (ZOVIRAX) 350 mg in dextrose 5 % 50 mL IVPB, Q12H for few more days then suppressive   midodrine (PROAMATINE) tablet 5 mg, BID PRN  Fidaxomicin (DIFICID) tablet 200 mg, BID  levoFLOXacin (LEVAQUIN) 750 MG/150ML infusion 750 mg, Q48H          Cont current care    Imaging and labs were reviewed per medical records      Thank you for involving me in the care of Saurabh Acevedo. Please do not hesitate to call for any questions or concerns.          Electronically signed by Jose Luis Pat MD on 3/17/2022 at 11:08 AM

## 2022-03-17 NOTE — PROGRESS NOTES
Speech Language Pathology      NAME:  Priti Negrete  :  1955  DATE: 3/17/2022  ROOM:  [facility]/2812-10    Patient seen for dysphagia therapy 15 minutes. Patient alert and conversing appropriately. Patient still with NG tube. Requesting intake. Attempted ice chips and small sips of water throat clear x1. Puree no throat clearing but did exhibit effortful swallow. Given her history of mild dysphagia that is typically exacerbated when she is ill, her admitting notes reporting questionable aspiration and her current NG tube recommend ice chips and small sips of water only tonight with nursing. If she continues to be alert and no clinical indicators of dysphagia with intake tomorrow will make diet recommendations at that time. Patient verbalized understanding and agreement. Discussed with nursing and Dr. Sandy Addison.       Confusion [R41.0]  Acute respiratory failure with hypoxia ([address] [J96.01]  Acute respiratory failure with hypoxemia ([address] [J96.01]    [id number]  dysphagia tx    Radha Victor MSCCC/SLP  Speech Language Pathologist  [id number]

## 2022-03-18 LAB
(1,3)-BETA-D-GLUCAN (FUNGITELL) INTERPRETATION: NEGATIVE
(1,3)-BETA-D-GLUCAN (FUNGITELL): <31 PG/ML
ALBUMIN SERPL-MCNC: 3.1 G/DL (ref 3.5–5.2)
ALP BLD-CCNC: 43 U/L (ref 35–104)
ALT SERPL-CCNC: 16 U/L (ref 0–32)
ANION GAP SERPL CALCULATED.3IONS-SCNC: 10 MMOL/L (ref 7–16)
ANISOCYTOSIS: ABNORMAL
AST SERPL-CCNC: 20 U/L (ref 0–31)
BASOPHILS ABSOLUTE: 0 E9/L (ref 0–0.2)
BASOPHILS RELATIVE PERCENT: 0 % (ref 0–2)
BILIRUB SERPL-MCNC: 0.4 MG/DL (ref 0–1.2)
BUN BLDV-MCNC: 30 MG/DL (ref 6–23)
CALCIUM SERPL-MCNC: 10.6 MG/DL (ref 8.6–10.2)
CHLORIDE BLD-SCNC: 113 MMOL/L (ref 98–107)
CO2: 26 MMOL/L (ref 22–29)
CREAT SERPL-MCNC: 1.4 MG/DL (ref 0.5–1)
EOSINOPHILS ABSOLUTE: 0.35 E9/L (ref 0.05–0.5)
EOSINOPHILS RELATIVE PERCENT: 4 % (ref 0–6)
GFR AFRICAN AMERICAN: 46
GFR NON-AFRICAN AMERICAN: 38 ML/MIN/1.73
GLUCOSE BLD-MCNC: 171 MG/DL (ref 74–99)
HCT VFR BLD CALC: 31.9 % (ref 34–48)
HEMOGLOBIN: 9.8 G/DL (ref 11.5–15.5)
LYMPHOCYTES ABSOLUTE: 0.61 E9/L (ref 1.5–4)
LYMPHOCYTES RELATIVE PERCENT: 7 % (ref 20–42)
MCH RBC QN AUTO: 31.1 PG (ref 26–35)
MCHC RBC AUTO-ENTMCNC: 30.7 % (ref 32–34.5)
MCV RBC AUTO: 101.3 FL (ref 80–99.9)
METAMYELOCYTES RELATIVE PERCENT: 2 % (ref 0–1)
MONOCYTES ABSOLUTE: 0.17 E9/L (ref 0.1–0.95)
MONOCYTES RELATIVE PERCENT: 2 % (ref 2–12)
MYELOCYTE PERCENT: 2 % (ref 0–0)
NEUTROPHILS ABSOLUTE: 7.57 E9/L (ref 1.8–7.3)
NEUTROPHILS RELATIVE PERCENT: 83 % (ref 43–80)
NUCLEATED RED BLOOD CELLS: 1 /100 WBC
OVALOCYTES: ABNORMAL
PDW BLD-RTO: 18.7 FL (ref 11.5–15)
PLATELET # BLD: 192 E9/L (ref 130–450)
PMV BLD AUTO: 12.5 FL (ref 7–12)
POIKILOCYTES: ABNORMAL
POLYCHROMASIA: ABNORMAL
POTASSIUM SERPL-SCNC: 3.7 MMOL/L (ref 3.5–5)
RBC # BLD: 3.15 E12/L (ref 3.5–5.5)
SODIUM BLD-SCNC: 149 MMOL/L (ref 132–146)
TOTAL PROTEIN: 4.7 G/DL (ref 6.4–8.3)
WBC # BLD: 8.7 E9/L (ref 4.5–11.5)

## 2022-03-18 PROCEDURE — 6370000000 HC RX 637 (ALT 250 FOR IP): Performed by: INTERNAL MEDICINE

## 2022-03-18 PROCEDURE — 6360000002 HC RX W HCPCS: Performed by: INTERNAL MEDICINE

## 2022-03-18 PROCEDURE — 6360000002 HC RX W HCPCS: Performed by: SPECIALIST

## 2022-03-18 PROCEDURE — 1200000000 HC SEMI PRIVATE

## 2022-03-18 PROCEDURE — 2580000003 HC RX 258: Performed by: SPECIALIST

## 2022-03-18 PROCEDURE — 2700000000 HC OXYGEN THERAPY PER DAY

## 2022-03-18 PROCEDURE — 80053 COMPREHEN METABOLIC PANEL: CPT

## 2022-03-18 PROCEDURE — 6370000000 HC RX 637 (ALT 250 FOR IP): Performed by: SPECIALIST

## 2022-03-18 PROCEDURE — 97110 THERAPEUTIC EXERCISES: CPT

## 2022-03-18 PROCEDURE — 97530 THERAPEUTIC ACTIVITIES: CPT

## 2022-03-18 PROCEDURE — 2580000003 HC RX 258: Performed by: INTERNAL MEDICINE

## 2022-03-18 PROCEDURE — 85025 COMPLETE CBC W/AUTO DIFF WBC: CPT

## 2022-03-18 PROCEDURE — 92526 ORAL FUNCTION THERAPY: CPT | Performed by: SPEECH-LANGUAGE PATHOLOGIST

## 2022-03-18 PROCEDURE — 94640 AIRWAY INHALATION TREATMENT: CPT

## 2022-03-18 PROCEDURE — 36415 COLL VENOUS BLD VENIPUNCTURE: CPT

## 2022-03-18 RX ADMIN — CLOZAPINE 50 MG: 25 TABLET ORAL at 20:16

## 2022-03-18 RX ADMIN — MAGNESIUM OXIDE 400 MG: 400 TABLET ORAL at 09:30

## 2022-03-18 RX ADMIN — Medication 10 ML: at 20:21

## 2022-03-18 RX ADMIN — IPRATROPIUM BROMIDE AND ALBUTEROL SULFATE 1 AMPULE: .5; 2.5 SOLUTION RESPIRATORY (INHALATION) at 17:16

## 2022-03-18 RX ADMIN — CYCLOSPORINE 100 MG: 100 SOLUTION ORAL at 09:29

## 2022-03-18 RX ADMIN — PANTOPRAZOLE SODIUM 40 MG: 40 TABLET, DELAYED RELEASE ORAL at 05:28

## 2022-03-18 RX ADMIN — IPRATROPIUM BROMIDE AND ALBUTEROL SULFATE 1 AMPULE: .5; 2.5 SOLUTION RESPIRATORY (INHALATION) at 05:05

## 2022-03-18 RX ADMIN — APIXABAN 5 MG: 5 TABLET, FILM COATED ORAL at 20:13

## 2022-03-18 RX ADMIN — IPRATROPIUM BROMIDE AND ALBUTEROL SULFATE 1 AMPULE: .5; 2.5 SOLUTION RESPIRATORY (INHALATION) at 02:08

## 2022-03-18 RX ADMIN — VENLAFAXINE 37.5 MG: 37.5 TABLET ORAL at 17:22

## 2022-03-18 RX ADMIN — MYCOPHENOLATE MOFETIL 500 MG: 200 POWDER, FOR SUSPENSION ORAL at 20:56

## 2022-03-18 RX ADMIN — CYCLOSPORINE 100 MG: 100 SOLUTION ORAL at 20:56

## 2022-03-18 RX ADMIN — ATORVASTATIN CALCIUM 10 MG: 10 TABLET, FILM COATED ORAL at 20:13

## 2022-03-18 RX ADMIN — FIDAXOMICIN 200 MG: 200 TABLET, FILM COATED ORAL at 20:13

## 2022-03-18 RX ADMIN — Medication 10 ML: at 09:32

## 2022-03-18 RX ADMIN — MYCOPHENOLATE MOFETIL 500 MG: 200 POWDER, FOR SUSPENSION ORAL at 09:31

## 2022-03-18 RX ADMIN — ALLOPURINOL 100 MG: 100 TABLET ORAL at 09:30

## 2022-03-18 RX ADMIN — APIXABAN 5 MG: 5 TABLET, FILM COATED ORAL at 09:30

## 2022-03-18 RX ADMIN — PREDNISONE 5 MG: 5 TABLET ORAL at 09:30

## 2022-03-18 RX ADMIN — HEPARIN 100 UNITS: 100 SYRINGE at 23:52

## 2022-03-18 RX ADMIN — DOCUSATE SODIUM 100 MG: 50 LIQUID ORAL at 09:30

## 2022-03-18 RX ADMIN — FOLIC ACID 1 MG: 1 TABLET ORAL at 09:30

## 2022-03-18 RX ADMIN — IPRATROPIUM BROMIDE AND ALBUTEROL SULFATE 1 AMPULE: .5; 2.5 SOLUTION RESPIRATORY (INHALATION) at 09:16

## 2022-03-18 RX ADMIN — LEVOTHYROXINE SODIUM 50 MCG: 0.05 TABLET ORAL at 05:28

## 2022-03-18 RX ADMIN — ACYCLOVIR SODIUM 350 MG: 50 INJECTION, SOLUTION INTRAVENOUS at 23:58

## 2022-03-18 RX ADMIN — VENLAFAXINE 37.5 MG: 37.5 TABLET ORAL at 09:30

## 2022-03-18 RX ADMIN — SODIUM BICARBONATE 650 MG: 648 TABLET ORAL at 09:30

## 2022-03-18 RX ADMIN — HEPARIN 100 UNITS: 100 SYRINGE at 09:31

## 2022-03-18 RX ADMIN — IPRATROPIUM BROMIDE AND ALBUTEROL SULFATE 1 AMPULE: .5; 2.5 SOLUTION RESPIRATORY (INHALATION) at 13:11

## 2022-03-18 RX ADMIN — FIDAXOMICIN 200 MG: 200 TABLET, FILM COATED ORAL at 09:30

## 2022-03-18 RX ADMIN — ACYCLOVIR SODIUM 350 MG: 50 INJECTION, SOLUTION INTRAVENOUS at 11:34

## 2022-03-18 NOTE — PROGRESS NOTES
[facility]     [address] [place] [address]  [place], [address]  Phone [phone]   [phone]      Admit Date: 3/9/2022 10:02 AM  Pt Name: Azalea Voss  MRN: [id number]  : 1955  Reason for Consult:    Chief Complaint   Patient presents with    Altered Mental Status     from NH     Requesting Physician:  Geeta Grant MD  PCP: Nestor Echeverria MD  History Obtained From:  patient, chart   ID consulted for Confusion [R41.0]  Acute respiratory failure with hypoxia ([address] [J96.01]  Acute respiratory failure with hypoxemia ([address] [J96.01]  on hospital day 7   Face to face encounter   [facility]       Chief Complaint   Patient presents with    Altered Mental Status     from NH     HISTORYOF PRESENT ILLNESS   Azalea Voss is a 73 y.o. female who presents with   has a past medical history of Abnormal EKG, Acute gout involving toe of right foot, Acute on chronic combined systolic (congestive) and diastolic (congestive) heart failure ([address] Cancer ([address] Cerebrovascular disease, Clotting disorder ([address] Depression, Hemodialysis patient ([address] History of blood transfusion, Hyperlipidemia, Hypertension, Hypothyroidism, Leg swelling, Lymphedema of both lower extremities, Peripheral vascular disease ([address] Renal failure, Thrombophlebitis, and Thyroid disease. Altered Mental Status      Know to ID   · Last seen on 3/2/2022-d/c on Augmentin cover poss aspiration and colitis  Pt came in due to altered mental status. Wbc24.7 hgb10.8 [id number] cr1.6 TMAX99.3  PT WAS SEEN AND EXAMINED WITH NURSES PRESENT  PT IS NOT AROUSABLE UNABLE TO OBTAIN HISTORY    PT HAS SOFT STOOLS SOME ODOR  SACRAL AREA VIEWED HAS SMALL DTI AREA  CXRY 1. Right perihilar discoid atelectasis. 2. There are no findings of failure or pneumonia.    LEFT SHOULDER No fracture.  Increased separation of the humeral head from the osseous   glenoid and AC joint, which could represent subluxation.  Otherwise, a large   synovial effusion, hematoma, or mass cannot be excluded.  Clinical   correlation is recommended.  CT scan of the left shoulder can be performed   for further evaluation.  Cardiomegaly. LEFT HUMERUS No fracture or dislocation.  Increased separation of the humeral head from   the osseous glenoid and AC joint, which could represent subluxation.  A large   synovial effusion, hematoma, or mass cannot be excluded.  CT scan of the left   shoulder can be considered for further evaluation. piperacillin-tazobactam (ZOSYN) 3,375 mg in dextrose 5 % 50 mL IVPB extended infusion (mini-bag), Q8H  vancomycin (VANCOCIN) oral solution 125 mg, 4 times per day  cycloSPORINE (SANDIMMUNE) capsule 100 mg, BID  mycophenolate (CELLCEPT) capsule 500 mg, BID  metoprolol succinate (TOPROL XL) extended release tablet 50 mg, Daily  predniSONE (DELTASONE) tablet 5 mg, Daily     LAST BLOOD CX 2/25 cons  2/28 CLEMENCIA  Summary   Technically difficult study. No definate evidence of vegetation consistent with endocarditis. Normal LV segmental wall motion. Ejection fraction is visually estimated at 60 to 65%. Normal right ventricular size and function. DOS  03/18/22   Awake today   Still with NG tube  Conversing - has been afebrile. On 2 liters nasal canula.      3/17/2022  Much more awake today conversing working with pt    3/16/2022  Son present and updated questions answered  Pt in bed has ngt briefly arouses    3/15/2022  In bed responds a little has ngt   Nurse/aide present    3/14/2022  In bed more awake has ngt  afebrile 2L cr2.95 wbc4.7     3/13/2022  In bed briefly arouses   Does not stay awake   Spoke with nurse unable to give meds    3/12/2022  briefly arouses has no abd pain    3/11/2022  In bed open eyes briefly still lethargic  cdiff +  REVIEW OF SYSTEMS     CONSTITUTIONAL:   AS IN HPI     Medications Prior to Admission: pantoprazole (PROTONIX) 40 MG tablet, Take 1 tablet by mouth every morning (before breakfast)  [] amoxicillin-clavulanate (AUGMENTIN) 875-125 MG per tablet, Take 1 tablet by mouth every 12 hours for 10 days  apixaban (ELIQUIS) 5 MG TABS tablet, Take 5 mg by mouth 2 times daily Take in the morning and at bedtime for 3 months (started 2022)  atorvastatin (LIPITOR) 10 MG tablet, Take 10 mg by mouth daily  lisinopril (PRINIVIL;ZESTRIL) 5 MG tablet, take 1 tablet by mouth once daily  pregabalin (LYRICA) 75 MG capsule,   nystatin (MYCOSTATIN) 815956 UNIT/GM powder, Apply 3 times daily. ipratropium-albuterol (DUONEB) 0.5-2.5 (3) MG/3ML SOLN nebulizer solution, Inhale 3 mLs into the lungs every 6 hours as needed for Shortness of Breath  ondansetron (ZOFRAN-ODT) 4 MG disintegrating tablet, Take 1 tablet by mouth every 8 hours as needed for Nausea or Vomiting  allopurinol (ZYLOPRIM) 100 MG tablet, Take 100 mg by mouth daily  Cyanocobalamin (B-12 COMPLIANCE INJECTION) 1000 MCG/ML KIT, Inject as directed monthly  metoprolol succinate (TOPROL XL) 50 MG extended release tablet, take 1 tablet by mouth once daily  desvenlafaxine succinate (PRISTIQ) 50 MG TB24 extended release tablet, Take 1 tablet by mouth every evening  cloZAPine (CLOZARIL) 50 MG tablet, Take 50 mg by mouth nightly  cycloSPORINE (SANDIMMUNE) 100 MG capsule, Take 100 mg by mouth 2 times daily  predniSONE (DELTASONE) 5 MG tablet, Take 5 mg by mouth daily  folic acid (FOLVITE) 1 MG tablet, Take 1 mg by mouth daily  docusate sodium (COLACE) 100 MG capsule, Take 100 mg by mouth 2 times daily  mycophenolate (CELLCEPT) 500 MG tablet, Take 1,000 mg by mouth 2 times daily  levothyroxine (SYNTHROID) 50 MCG tablet, Take 50 mcg by mouth daily.     budesonide (PULMICORT) 0.5 MG/2ML nebulizer suspension, Take 2 mLs by nebulization 2 times daily for 10 days  CURRENT MEDICATIONS     Current Facility-Administered Medications:     sodium bicarbonate tablet 650 mg, 650 mg, Oral, Daily, Michelle Moise MD, 650 mg at 22 5529   cycloSPORINE (NEORAL) 100 MG/ML microemulsion solution 100 mg, 100 mg, Oral, BID, Susannah Ramirez MD, 100 mg at 03/18/22 0929    0.9 % sodium chloride infusion, , IntraVENous, PRN, Pankaj Giraldo MD    [facility]) tablet 37.5 mg, 37.5 mg, Oral, BID WC, Susannah Ramirez MD, 37.5 mg at 03/18/22 0930    epoetin casey-epbx (RETACRIT) injection 2,000 Units, 2,000 Units, SubCUTAneous, Q7 Days, 2,000 Units at 03/16/22 2316 **AND** epoetin casey-epbx (RETACRIT) injection 3,000 Units, 3,000 Units, SubCUTAneous, Q7 Days, Lesly Antonio MD, 3,000 Units at 03/16/22 2314    mycophenolate (CELLCEPT) 200 MG/ML suspension 500 mg, 500 mg, Oral, BID, Pankaj Giraldo MD, 500 mg at 03/18/22 0931    magnesium oxide (MAG-OX) tablet 400 mg, 400 mg, Oral, Daily, Dick Wilde MD, 400 mg at 03/18/22 0930    potassium chloride (KLOR-CON M) extended release tablet 20 mEq, 20 mEq, Oral, Once, Pankaj Giraldo MD    docusate (COLACE) 50 MG/5ML liquid 100 mg, 100 mg, Per NG tube, BID, Pankaj Giraldo MD, 100 mg at 03/18/22 0930    ipratropium-albuterol (DUONEB) nebulizer solution 1 ampule, 1 ampule, Inhalation, Q4H, Shay Jones MD, 1 ampule at 03/18/22 0916    acyclovir (ZOVIRAX) 350 mg in dextrose 5 % 50 mL IVPB, 5 mg/kg (Adjusted), IntraVENous, Q12H, Miya Ramon MD, Stopped at 03/17/22 2333    midodrine (PROAMATINE) tablet 5 mg, 5 mg, Oral, BID PRN, Pankaj Giraldo MD    lidocaine 1 % injection 5 mL, 5 mL, IntraDERmal, Once, Pankaj Giraldo MD    sodium chloride flush 0.9 % injection 5-40 mL, 5-40 mL, IntraVENous, 2 times per day, Pankaj Giraldo MD, 10 mL at 03/18/22 0932    sodium chloride flush 0.9 % injection 5-40 mL, 5-40 mL, IntraVENous, PRN, Pankaj Giraldo MD, 10 mL at 03/16/22 1812    0.9 % sodium chloride infusion, 25 mL, IntraVENous, PRN, Jez Ramirez MD    heparin flush 100 UNIT/ML injection 100 Units, 1 mL, IntraVENous, 2 times per day, Pankaj Giraldo MD, 100 Units at 03/18/22 0931    heparin flush 100 UNIT/ML injection 100 Units, 1 mL, IntraCATHeter, PRN, Andie Ramirze MD    [Held by provider] metoprolol tartrate (LOPRESSOR) tablet 12.5 mg, 12.5 mg, Oral, BID, Helio Garibay MD    Fidaxomicin (DIFICID) tablet 200 mg, 200 mg, Oral, BID, Clifton Patrick MD, 200 mg at 03/18/22 0930    levoFLOXacin (LEVAQUIN) 750 MG/150ML infusion 750 mg, 750 mg, IntraVENous, Q48H, Clifton Patrick MD, Stopped at 03/17/22 2042    apixaban (ELIQUIS) tablet 5 mg, 5 mg, Oral, BID, Susannah Ramirez MD, 5 mg at 03/18/22 0930    ondansetron (ZOFRAN-ODT) disintegrating tablet 4 mg, 4 mg, Oral, Q8H PRN, Andie Ramirez MD    atorvastatin (LIPITOR) tablet 10 mg, 10 mg, Oral, Nightly, Susannah Ramirez MD, 10 mg at 03/17/22 2127    cloZAPine (CLOZARIL) tablet 50 mg, 50 mg, Oral, Nightly, Susannah Ramirez MD, 50 mg at 03/17/22 2128    levothyroxine (SYNTHROID) tablet 50 mcg, 50 mcg, Oral, Daily, Andie Ramirez MD, 50 mcg at 03/18/22 5115    pantoprazole (PROTONIX) tablet 40 mg, 40 mg, Oral, QAM AC, Susannah Ramirez MD, 40 mg at 03/18/22 2834    predniSONE (DELTASONE) tablet 5 mg, 5 mg, Oral, Daily, Susannha Ramirez MD, 5 mg at 03/18/22 0930    allopurinol (ZYLOPRIM) tablet 100 mg, 100 mg, Oral, Daily, Susannah Ramirez MD, 100 mg at 65/60/82 5654    folic acid (FOLVITE) tablet 1 mg, 1 mg, Oral, Daily, Susannah Ramirez MD, 1 mg at 03/18/22 0930  ALLERGIES     Macrodantin [nitrofurantoin macrocrystal] and Sulfa antibiotics  Immunization History   Administered Date(s) Administered    COVID-19, Pfizer Purple top, DILUTE for use, 12+ yrs, 30mcg/0.3mL dose 01/28/2021, 02/18/2021, 08/18/2021      Internal Administration   First Dose COVID-19, Pfizer Purple top, DILUTE for use, 12+ yrs, 30mcg/0.3mL dose  01/28/2021   Second Dose COVID-19, Pfizer Purple top, DILUTE for use, 12+ yrs, 30mcg/0.3mL dose   02/18/2021       Last COVID Lab SARS-CoV-2 (no units)   Date Value   01/20/2021 Not Detected     SARS-CoV-2 Antibody, Total (no units)   Date Value   2022 Non-Reactive     SARS-CoV-2, PCR (no units)   Date Value   2022 Not Detected     SARS-CoV-2, NAAT (no units)   Date Value   2022 Not Detected        PAST MEDICAL HISTORY     Past Medical History:   Diagnosis Date    Abnormal EKG     left bundle branch block    Acute gout involving toe of right foot 2020    Acute on chronic combined systolic (congestive) and diastolic (congestive) heart failure (HCC)     Cancer (HCC)     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    Cerebrovascular disease     Clotting disorder ([address] 1985    thrombophlebitis after c section delivery    Depression     15 years followed by dr Angeles Hardy Hemodialysis patient [facility])     History of blood transfusion     Hyperlipidemia     Hypertension     Hypothyroidism     Leg swelling 2020    Lymphedema of both lower extremities 2020    Peripheral vascular disease ([facility]     Renal failure 2012    renal transplant    Thrombophlebitis     after c section delivery    Thyroid disease      SURGICAL HISTORY       Past Surgical History:   Procedure Laterality Date     SECTION  1985    one normal delivery    COLECTOMY N/A 1/15/2022    DIAGNOSTIC  LAPAROSCOPY LYSIS OF ADHESIONS performed by Jake Washburn MD at [address] [place] [address] Drive CATH LAB PROCEDURE      normal coronaries and 1996 normal coronaries    DIALYSIS FISTULA CREATION  march and 2012    phase one and two patient will start dialysis when needed   [address]    transfemoral venous bypass grafts    IR IVC FILTER PLACEMENT W IMAGING  2022    IR IVC FILTER PLACEMENT W IMAGING 2022 [facility]ED[facility]    KIDNEY TRANSPLANT  2016    KIDNEY TRANSPLANT  2015    KIDNEY TRANSPLANT  2015    PARATHYROIDECTOMY  2006    left parathyroid  implant and parathyroidectomy    TRANSESOPHAGEAL ECHOCARDIOGRAM N/A 2022    TRANSESOPHAGEAL ECHOCARDIOGRAM WITH BUBBLE STUDY performed by Jessica Renteria MD at [facility]     ·  [address]:    03/17/22 0756 03/17/22 1515 03/17/22 2000 03/18/22 0915   BP: 133/61 (!) 173/81 129/85 (!) 148/76   Pulse: 97 105 102 96   Resp: 18 18 18 18   Temp: 97.5 °F (36.4 °C) 97.7 °F (36.5 °C) 98 °F (36.7 °C) 97.5 °F (36.4 °C)   TempSrc: Axillary  Oral Oral   SpO2: 97% 96% 97% 97%   Weight:       Height:         Physical Exam   Constitutional/General:  In bed  Head: NC/AT  Pulmonary: . ON O2 , diminished to bases. Cardiovascular:  Regular rate and rhythm  Abdomen:   distension. NG  Extremities:  Warm and well perfused dec rom   Skin: Warm and dry  No rash , ++ edema. Neurologic:     Awake and alert  MERCHANT   3/11 right arm- midline        DIAGNOSTIC RESULTS   RADIOLOGY:   ECHO Transesophageal    Result Date: 3/1/2022  Transesophageal Echocardiography Report (CLEMENCIA)   Demographics   Patient Name      Lori Tran       Gender              Female                    [address] [facility] Record    [id number]           Room Number         6288  Number   Account #         [de-identified]          Procedure Date      02/28/2022   Corporate ID                         Ordering Physician  Shobha Rocha MD   Accession Number  [id number]         Referring Physician   Date of Birth     1955         Sonographer         Jack Bhatti                                                           [facility]   Age               73 year(s)         Interpreting        Shobha Rocha MD                                       Physician                                        Any Other  Procedure Type of Study   CLEMENCIA procedure:Transesophageal Echo (CLEMENCIA), Color Flow Velocity Mapping. Procedure Date Date: 02/28/2022 Start: 11:00 AM Study Location: Portable Technical Quality: Adequate visualization Indications:R/O Endocarditis.  Patient Status: Routine Height: 62 inches Weight: 220 pounds BSA: 1.99 m^2 BMI: 40.24 kg/m^2 BP: 138/79 mmHg CLEMENCIA Performed By: the attending and the sonographer  Type of Anesthesia: Moderate sedation   Findings   Left Ventricle  Normal LV segmental wall motion. Ejection fraction is visually estimated at 60 to 65%. Right Ventricle  Normal right ventricular size and function. Left Atrium  No evidence of thrombus within left atrium. Right Atrium  Normal right atrium size. Agitated saline injected for shunt evaluation shows no shunt. Mitral Valve  No vegetation. Mild mitral regurgitation is present. Tricuspid Valve  No vegetation. Aortic Valve  No vegetation. The aortic valve appears mildly sclerotic. Pulmonic Valve  No vegetation. Conclusions   Summary  Technically difficult study. No definate evidence of vegetation consistent with endocarditis. Normal LV segmental wall motion. Ejection fraction is visually estimated at 60 to 65%. Normal right ventricular size and function. Signature   ----------------------------------------------------------------  Electronically signed by Shun Agee MD(Interpreting  physician) on 03/01/2022 09:11 AM  ----------------------------------------------------------------  [email or web address] [id number]    CT HEAD WO CONTRAST    Result Date: 2/25/2022  EXAMINATION: CT OF THE HEAD WITHOUT CONTRAST  2/25/2022 3:08 am TECHNIQUE: CT of the head was performed without the administration of intravenous contrast. Dose modulation, iterative reconstruction, and/or weight based adjustment of the mA/kV was utilized to reduce the radiation dose to as low as reasonably achievable. COMPARISON: Correlation with MRI dated 01/16/2022 HISTORY: ORDERING SYSTEM PROVIDED HISTORY: mental status change TECHNOLOGIST PROVIDED HISTORY: Has a \"code stroke\" or \"stroke alert\" been called? ->No Reason for exam:->mental status change Decision Support Exception - unselect if not a suspected or confirmed emergency medical condition->Emergency Medical Condition (MA) FINDINGS: BRAIN/VENTRICLES: There is no acute intracranial hemorrhage, mass effect or midline shift. No abnormal extra-axial fluid collection. The gray-white differentiation is maintained without evidence of an acute infarct. There is no evidence of hydrocephalus. Minimal cortical volume loss. Scattered vascular calcifications. ORBITS: The visualized portion of the orbits demonstrate no acute abnormality. SINUSES: Significant opacification of the left greater than right mastoid air cells similar to previous. Mild mucosal thickening in the left sphenoid sinus. This was also present previously. SOFT TISSUES/SKULL:  No acute abnormality of the visualized skull or soft tissues. No acute intracranial abnormality. MRI would be useful if symptoms persist. Inflammatory changes of left greater than right mastoid air cells and left sphenoid sinus similar to prior MRI. RECOMMENDATIONS: Unavailable     NM LUNG VENT/PERFUSION (VQ)    Result Date: 2/25/2022  EXAMINATION: NUCLEAR MEDICINE VENTILATION PERFUSION SCAN. 2/25/2022 TECHNIQUE: 3.5 millicuries aerosolized Tc99m DTPA was administered via mask prior to planar imaging of the lungs in multiple projections. Then, 4.2 millicuries of Tc 87G MAA was administered intravenously prior to planar imaging of the lungs in similar projections. COMPARISON: Chest CT February 25, 2022 . HISTORY: ORDERING SYSTEM PROVIDED HISTORY: R/O PE TECHNOLOGIST PROVIDED HISTORY: Reason for exam:->R/O PE What reading provider will be dictating this exam?->CRC FINDINGS: PERFUSION: Distribution of radiotracer is homogeneous. No segmental defects identified. VENTILATION: Ventilation images are unremarkable. CHEST CT: Small infiltrate or atelectasis posterior right lung. Negative for pulmonary embolus.      CT ABDOMEN PELVIS W IV CONTRAST Additional Contrast? None    Result Date: 2/25/2022  EXAMINATION: CT OF THE ABDOMEN AND PELVIS WITH CONTRAST 2/25/2022 3:08 am TECHNIQUE: CT of the abdomen and pelvis was performed with the administration of intravenous contrast. Multiplanar reformatted images are provided for review. Dose modulation, iterative reconstruction, and/or weight based adjustment of the mA/kV was utilized to reduce the radiation dose to as low as reasonably achievable. COMPARISON: 01/17/2022 HISTORY: ORDERING SYSTEM PROVIDED HISTORY: Abdominal distention, AMS TECHNOLOGIST PROVIDED HISTORY: Reason for exam:->Abdominal distention, AMS Additional Contrast?->None Decision Support Exception - unselect if not a suspected or confirmed emergency medical condition->Emergency Medical Condition (MA) FINDINGS: Many images are partially degraded by patient motion related artifact. Low-attenuation focus in the central aspect of the left lobe of the liver as well as a tiny low-attenuation focus in the caudate lobe. There is also a tiny low-attenuation focus in the extreme inferior aspect of the right lobe. These are difficult to definitively characterize due to their small size but likely represent small hepatic cysts. Gallbladder is unremarkable. Spleen is normal in size. No evidence of acute pancreatitis. There is a E small exophytic cystic appearing lesion along the anterior aspect of the body of the pancreas measuring 7 Hounsfield units and 1.4 cm. This is unchanged dating back to 11/17/2021. This is also favored to be incidental but could be followed. No adrenal mass. No hydronephrosis. The native kidneys remains significantly atrophic and contain numerous cystic lesions. Some of these are hyperdense but also unchanged dating back to the 11/17/2021 exam and favored to represent hyperdense or hemorrhagic cysts. Small renal cortical calcifications and or nonobstructing stones. A right lower quadrant renal transplant demonstrates no hydronephrosis. Small hiatal hernia.   Mild fluid distention of a few small bowel loops in the upper abdomen. No bowel obstruction. The cecum is mobile. The appendix is not identified with certainty. There is some localized thickening of the proximal ascending colon. This does appear to be new compared to the previous examination. Moderate stool in the distal colon. Residual contrast in the colon likely due to contrast administered on the prior examination. Uterus is atrophic. Urinary bladder is largely decompressed with Chin catheter. Minimal gas in the bladder likely due to the catheter. Partial visualization of femoral to femoral bypass graft. Degenerative changes are present in the spine and pelvis. Please see separate dictated report for CT of the chest.     Thickening of the proximal ascending colon. Given the short-term change, this is favored to represent a localized area of colitis likely infectious or inflammatory. Neoplasia thought less likely but follow-up to resolution would be recommended. Moderate stool in the distal colon. Minimal distention of several proximal small bowel loops likely incidental or due to mild enteritis. Small hiatal hernia. Other chronic appearing findings. RECOMMENDATIONS: Unavailable     IR GUIDED IVC FILTER PLACEMENT    Result Date: 2/26/2022  EXAMINATION: INFERIOR VENA CAVA (IVC) FILTER INSERTION 2/26/2022 Procedural Personnel: Layne Nam MD HISTORY: Indication: Bleeding with IV heparin ORDERING SYSTEM PROVIDED HISTORY: IVC filter TECHNOLOGIST PROVIDED HISTORY: Reason for exam:->IVC filter Anticoagulation contraindicated SEDATION: None CONTRAST: Contrast agent: Isovue 320 Contrast volume: 30mL FLUOROSCOPY DOSE AND TYPE OR TIME AND EXPOSURES: Fluoroscopy time/Number of Images: Fluoroscopy time 1.5 minutes. Reference [facility] kerma: [id number] PROCEDURE: Informed consent for the procedure including risks, benefits and alternatives was obtained and time-out was performed prior to the procedure. Universal protocol was followed.  A suitable skin site was prepared and draped using all elements of maximal sterile barrier technique including sterile gloves, sterile gown, cap, mask, large sterile sheet, sterile ultrasound probe cover, hand hygiene, and cutaneous antisepsis. Time-out was called and the patient's order and identity were verified. Local anesthesia was administered. The vessel was sonographically evaluated and judged appropriate for access. Real time ultrasound was used to visualize needle entry into the vessel and an ultrasound image was stored in PACS. Vein accessed: Right common femoral vein. Access technique: Micropuncture set with 21 gauge needle A 0.035 guide wire was used to place a catheter into the inferior vena cava. A vena cavogram was performed. A [employer] [place] filter was deployed in routine fashion in the infrarenal IVC under fluoroscopic guidance. The sheath was removed and hemostasis was achieved with manual compression. A sterile dressing was applied. Patient tolerated procedure well. Complications: No immediate complications. Standardized report: [id number] [id number] FINDINGS: US: The access vein is patent. Inferior Vena Cavogram: The vena cava is patent and normal in size without thrombus or anomalies. Post-placement imaging: Spot filmdemonstrates the filter in the infrarenalvena cava with less than 15 degrees tilt from the vena cava access. Successful vena cava filter placement. PLAN: Retrieval intent (MIPS): The filter is potentially retrievable. Plan/Timing For Retrieval: Ordering Physician/Contact For Retrieval Plan: Kaila Marks     XR CHEST PORTABLE    Result Date: 3/9/2022  EXAMINATION: ONE XRAY VIEW OF THE CHEST 3/9/2022 10:28 am COMPARISON: 02/25/2022 HISTORY: ORDERING SYSTEM PROVIDED HISTORY: ams TECHNOLOGIST PROVIDED HISTORY: Reason for exam:->ams FINDINGS: The cardiac silhouette is at upper limits of normal in size.   There are no findings of failure There is a linear focus seen within the right perihilar region favored to represent atelectasis. There is no focal consolidation seen within the right or left lung to suggest pneumonia. There is no pleural effusion. 1. Right perihilar discoid atelectasis. 2. There are no findings of failure or pneumonia. XR CHEST 1 VIEW    Result Date: 2/25/2022  EXAMINATION: ONE XRAY VIEW OF THE CHEST 2/25/2022 2:49 pm COMPARISON: None. HISTORY: ORDERING SYSTEM PROVIDED HISTORY: needs to be done with VQ scan TECHNOLOGIST PROVIDED HISTORY: Last chest Xray done more than 24 hours ago Reason for exam:->needs to be done with VQ scan FINDINGS: The heart is mildly enlarged. There are no findings of failure. The lungs are clear. There is no focal infiltrate or effusion. .     1. Mild cardiomegaly. There is no evidence of failure or pneumonia. CTA PULMONARY W CONTRAST    Result Date: 2/25/2022  EXAMINATION: CTA OF THE CHEST 2/25/2022 3:08 am TECHNIQUE: CTA of the chest was performed after the administration of intravenous contrast.  Multiplanar reformatted images are provided for review. MIP images are provided for review. Dose modulation, iterative reconstruction, and/or weight based adjustment of the mA/kV was utilized to reduce the radiation dose to as low as reasonably achievable. COMPARISON: Portable chest dated 01/22/2022 and CT dated 01/17/2022 HISTORY: ORDERING SYSTEM PROVIDED HISTORY: Hypoxic, SOB TECHNOLOGIST PROVIDED HISTORY: Reason for exam:->Hypoxic, SOB Decision Support Exception - unselect if not a suspected or confirmed emergency medical condition->Emergency Medical Condition (MA) FINDINGS: Pulmonary Arteries: Assessment is limited due to patient motion artifact. A small or peripheral embolism would be difficult to exclude but no large or central embolism identified. Mediastinum: Moderately severe cardiomegaly is similar to previous. No pericardial effusion. No aortic dissection. Scattered vascular calcifications.   Normal-size lymph nodes without adenopathy by size criteria. Lungs/pleura: No pneumothorax. Mild atelectasis in the right greater than left lung base. Otherwise, there has been significant improvement in aeration of the lungs since previous. Upper Abdomen: Partial visualization of the upper right kidney with cystic appearing foci as well as a partially visualized hyperdense lesion likely representing a hyperdense proteinaceous cyst and similar to previous but continued follow-up would be useful. Visualized portions of the upper abdomen demonstrate no acute abnormality. Small hiatal hernia. Soft Tissues/Bones: Degenerative changes are scattered in the spine. Allowing for patient motion artifact, no identified pulmonary embolism. Significant improvement in aeration of the lungs with some residual areas of presumed atelectasis in the right greater than left base. Cardiomegaly. RECOMMENDATIONS: Unavailable     US DUP UPPER EXTREMITY LEFT VENOUS    Result Date: 2/27/2022  EXAMINATION: VENOUS ULTRASOUND OF THE LEFT UPPER EXTREMITY, 2/27/2022 12:55 pm TECHNIQUE: Duplex ultrasound using B-mode/gray scaled imaging and Doppler spectral analysis and color flow was obtained of the deep venous structures of the left upper extremity. COMPARISON: None. HISTORY: ORDERING SYSTEM PROVIDED HISTORY: left arm edema TECHNOLOGIST PROVIDED HISTORY: Portable please if possible Reason for exam:->left arm edema What reading provider will be dictating this exam?->CRC FINDINGS: There is normal flow and compressibility of the visualized venous structures. There is no evidence of echogenic thrombus. The veins demonstrate good compressibility with normal color flow study and spectral analysis. The AV fistula is patent. No evidence of DVT.      IR ULTRASOUND GUIDANCE VASCULAR ACCESS    Result Date: 2/26/2022  EXAMINATION: INFERIOR VENA CAVA (IVC) FILTER INSERTION 2/26/2022 Procedural Personnel: Brooklynn Lucio MD HISTORY: Indication: Bleeding with IV heparin ORDERING SYSTEM PROVIDED HISTORY: IVC filter TECHNOLOGIST PROVIDED HISTORY: Reason for exam:->IVC filter Anticoagulation contraindicated SEDATION: None CONTRAST: Contrast agent: Isovue 320 Contrast volume: 30mL FLUOROSCOPY DOSE AND TYPE OR TIME AND EXPOSURES: Fluoroscopy time/Number of Images: Fluoroscopy time 1.5 minutes. Reference [facility] [place] kerma: [id number] PROCEDURE: Informed consent for the procedure including risks, benefits and alternatives was obtained and time-out was performed prior to the procedure. Universal protocol was followed. A suitable skin site was prepared and draped using all elements of maximal sterile barrier technique including sterile gloves, sterile gown, cap, mask, large sterile sheet, sterile ultrasound probe cover, hand hygiene, and cutaneous antisepsis. Time-out was called and the patient's order and identity were verified. Local anesthesia was administered. The vessel was sonographically evaluated and judged appropriate for access. Real time ultrasound was used to visualize needle entry into the vessel and an ultrasound image was stored in PACS. Vein accessed: Right common femoral vein. Access technique: Micropuncture set with 21 gauge needle A 0.035 guide wire was used to place a catheter into the inferior vena cava. A vena cavogram was performed. A [employer] [place] filter was deployed in routine fashion in the infrarenal IVC under fluoroscopic guidance. The sheath was removed and hemostasis was achieved with manual compression. A sterile dressing was applied. Patient tolerated procedure well. Complications: No immediate complications. Standardized report: [id number] [id number] FINDINGS: US: The access vein is patent. Inferior Vena Cavogram: The vena cava is patent and normal in size without thrombus or anomalies. Post-placement imaging: Spot filmdemonstrates the filter in the infrarenalvena cava with less than 15 degrees tilt from the vena cava access.      Successful vena cava filter placement. PLAN: Retrieval intent (MIPS): The filter is potentially retrievable. Plan/Timing For Retrieval: Ordering Physician/Contact For Retrieval Plan: Hector GUERRIER MODIFIED BARIUM SWALLOW W VIDEO    Result Date: 2/26/2022  EXAMINATION: MODIFIED BARIUM SWALLOW WAS PERFORMED IN CONJUNCTION WITH SPEECH PATHOLOGY SERVICES TECHNIQUE: Fluoroscopic evaluation of the swallowing mechanism was performed using cineradiography with multiple consistency of barium product in conjunction with speech pathology services. FLUOROSCOPY DOSE AND TYPE OR TIME AND EXPOSURES: Fluoroscopy time 2.1 minutes. Air kerma 5.49 mGy COMPARISON: None HISTORY: ORDERING SYSTEM PROVIDED HISTORY: dysphagia, aspriation pna? TECHNOLOGIST PROVIDED HISTORY: Reason for exam:->dysphagia, aspriation pna? FINDINGS: There was oral delay and pharyngeal delay. Laryngeal elevation was adequate. There was retention in the piriform sinuses but not the vallecula. Transient laryngeal penetration is seen with thin liquid barium. Otherwise, no aspiration or laryngeal penetration is seen. Strands it laryngeal penetration with thin liquid barium. No evidence of aspiration. Please see separate speech pathology report for full discussion of findings and recommendations. RECOMMENDATIONS: Unavailable     US DUP LOWER EXTREMITIES BILATERAL VENOUS    Result Date: 2/25/2022  EXAMINATION: DUPLEX VENOUS ULTRASOUND OF THE BILATERAL LOWER EXTREMITIES2/25/2022 2:47 pm TECHNIQUE: Duplex ultrasound using B-mode/gray scaled imaging, Doppler spectral analysis and color flow Doppler was obtained of the deep venous structures of the lower bilateral extremities. COMPARISON: None.  HISTORY: ORDERING SYSTEM PROVIDED HISTORY: R/O DVT TECHNOLOGIST PROVIDED HISTORY: Reason for exam:->R/O DVT What reading provider will be dictating this exam?->CRC FINDINGS: Right lower extremity:  The visualized veins of the bilateral lower extremities are patent and free of echogenic thrombus. The veins demonstrate good compressibility with normal color flow study and spectral analysis. Left lower extremity: There are incompletely occlusive thrombi within the mid and distal left femoral vein and the popliteal vein. They have developed in the interim since the prior lower extremity Doppler from 11/17/2021.     1. INCOMPLETELY OCCLUSIVE THROMBI in the mid and distal LEFT femoral vein and within the popliteal vein. They have developed in the interim since the previous study from 11/17/2021. 2. No evidence of DVT in the right lower extremity. RECOMMENDATIONS: Unavailable     LABS  Recent Labs     03/16/22  0630 03/16/22  0630 03/16/22  2345 03/17/22  0615 03/18/22  0530   WBC 4.4*  --   --  7.5 8.7   HGB 7.1*   < > 8.8* 9.1* 9.8*   HCT 23.6*   < > 28.8* 30.4* 31.9*   .8*  --   --  103.1* 101.3*     --   --  189 192    < > = values in this interval not displayed. Recent Labs     03/16/22  0908 03/16/22  0908 03/17/22  0616 03/17/22  0616 03/18/22  0530     --  144  --  149*   K 3.5   < > 3.2*   < > 3.7   *   < > 112*   < > 113*   CO2 22   < > 24   < > 26   BUN 31*  --  32*  --  30*   CREATININE 1.8*  --  1.5*  --  1.4*   GFRAA 34  --  42  --  46   LABGLOM 28  --  35  --  38   GLUCOSE 97  --  134*  --  171*   PROT 4.1*  --  4.5*  --  4.7*   LABALBU 2.4*  --  2.8*  --  3.1*   CALCIUM 9.2  --  10.2  --  10.6*   BILITOT 0.2  --  0.5  --  0.4   ALKPHOS 48  --  58  --  43   AST 20  --  19  --  20   ALT 17  --  17  --  16    < > = values in this interval not displayed. No results for input(s): PROCAL in the last 72 hours.   Lab Results   Component Value Date    CRP 18.3 (H) 01/23/2022    CRP 34.6 (H) 11/17/2021     Lab Results   Component Value Date    SEDRATE 61 (H) 01/23/2022    SEDRATE 62 (H) 11/20/2021     No results found for: [id number]  Lab Results   Component Value Date    COVID19 Not Detected 03/02/2022    COVID19 Not Detected 02/25/2022     COVID-19/MARINA-COV2 LABS  Recent Labs     03/16/22  0908 03/17/22  0616 03/18/22  0530   FERRITIN  --  745  --    AST 20 19 20   ALT 17 17 16      MICROBIOLOGY:  Lab Results   Component Value Date    BC 5 Days no growth 03/09/2022    BC 5 Days no growth 02/27/2022    BC  02/25/2022     This organism was isolated in one set. Susceptibility testing is not routinely done as this  organism frequently represents skin contamination. Additional testing can be ordered by calling the  Microbiology Department.       ORG Pseudomonas aeruginosa 03/09/2022    ORG Staphylococcus coagulase-negative 02/25/2022    ORG Enterococcus faecalis 01/12/2022     Lab Results   Component Value Date    BLOODCULT2 5 Days no growth 03/09/2022    BLOODCULT2 5 Days no growth 02/25/2022    BLOODCULT2 5 Days no growth 01/24/2022    ORG Pseudomonas aeruginosa 03/09/2022    ORG Staphylococcus coagulase-negative 02/25/2022    ORG Enterococcus faecalis 01/12/2022     WOUND/ABSCESS   Date Value Ref Range Status   01/24/2022 Growth not present  Final     Urine Culture, Routine   Date Value Ref Range Status   03/09/2022 75,000 CFU/ml  Final   02/26/2022 Growth not present  Final   01/23/2022 Growth not present  Final     MRSA Culture Only   Date Value Ref Range Status   11/17/2021 Methicillin resistant Staph aureus not isolated  Final     FINAL IMPRESSION    Patient is a 73 y.o. female who presented with   Chief Complaint   Patient presents with    Altered Mental Status     from NH    and admitted for Confusion [R41.0]  Acute respiratory failure with hypoxia ([address] [J96.01]  Acute respiratory failure with hypoxemia ([facility]) [J96.01]     · PT WITH ENCEPHALOPTHAY METABOLIC VS INFECTIOUS ETIOLOGY -improved   · H/O hsv IG G +   · S/P RX ASPIRATION PNEUMONIA/COLITIS  · H/O COVID  · KIDNEY TRANSPLANT ON IMMUNOSUPPRESSIVE  · Poss UTI Pseudomonas    · MASTOIDITIS/SINUSITIS    Plan:  · Continue dificid for 3 more days   · Continue acyclovir- IV will switch to PO in another day or so- suppressive   · On levaquin IV- Day #6 / 8  · Cont current care    Imaging and labs were reviewed per medical records      Thank you for involving me in the care of Sneha Hustonors. Please do not hesitate to call for any questions or concerns. Electronically signed by KATINA Orozco on 3/18/2022 at 10:35 AM        As above         This is a face to face encounter with Sneha Newman on 03/18/22. I have performed and participated in the history, exam, medical decision making, and  POC with the NURSE PRACTITIONER, KATINA Orozco. in bed nad  MS better  Afebrile 2l  fley   Wbc8.7 cr1.4   Cont rx  Still has ngt this am  Diet to be advanced if tolerates switch rx to po       Imaging and labs were reviewed. Sneha Hustonors was informed of their diagnosis, indications, risks and benefits of treatment. Sneha De Guzmanilors had the opportunity to ask questions. All questions were answered. Thank you for involving me in the care of Sneha Hustonors. Please do not hesitate to call for any questions or concerns.     Electronically signed by Angela Smith MD on 3/18/2022 at 3:40 PM

## 2022-03-18 NOTE — PROGRESS NOTES
Progress Note  Date:3/18/2022       Room:0415/0415-01  Patient Shay Dickinson     YOB: 1955     Age:66 y.o. Subjective    Subjective:  Symptoms:  Stable. (Wakes up on calling her but not as alert as yesterday). Diet:  Dietary issues: on TF. Activity level: Impaired due to weakness. Pain:  She reports no pain. Review of Systems  Objective         Vitals Last 24 Hours:  TEMPERATURE:  Temp  Av.8 °F (36.6 °C)  Min: 97.5 °F (36.4 °C)  Max: 98 °F (36.7 °C)  RESPIRATIONS RANGE: Resp  Av  Min: 18  Max: 18  PULSE OXIMETRY RANGE: SpO2  Av %  Min: 97 %  Max: 97 %  PULSE RANGE: Pulse  Av  Min: 96  Max: 102  BLOOD PRESSURE RANGE: Systolic (21KAK), VKX:207 , Min:129 , PHS:296   ; Diastolic (02AFI), WPN:37, Min:76, Max:85    I/O (24Hr): Intake/Output Summary (Last 24 hours) at 3/18/2022 1531  Last data filed at 3/18/2022 0603  Gross per 24 hour   Intake 1178.71 ml   Output 2500 ml   Net -1321.29 ml     Objective:  General Appearance: In no acute distress and comfortable. Vital signs: (most recent): Blood pressure (!) 148/76, pulse 96, temperature 97.5 °F (36.4 °C), temperature source Oral, resp. rate 18, height 5' 2\" (1.575 m), weight 226 lb (102.5 kg), SpO2 97 %. Vital signs are normal.    Output: Producing urine. HEENT: Normal HEENT exam.    Lungs:  Normal effort and normal respiratory rate. There are decreased breath sounds. Heart: Normal rate. Regular rhythm. Positive for murmur. Abdomen: Abdomen is soft and distended. Bowel sounds are normal.   There is no abdominal tenderness. Extremities: (Edema +2   Both LL )  Pulses: Distal pulses are intact. Neurological: Patient is alert. (Except on calling her but is not as alert as yesterday, speaks few words ). Pupils:  Pupils are equal, round, and reactive to light.       Labs/Imaging/Diagnostics    Labs:  CBC:  Recent Labs     22  0630 22  0630 22  1370 03/17/22  0615 03/18/22  0530   WBC 4.4*  --   --  7.5 8.7   RBC 2.19*  --   --  2.95* 3.15*   HGB 7.1*   < > 8.8* 9.1* 9.8*   HCT 23.6*   < > 28.8* 30.4* 31.9*   .8*  --   --  103.1* 101.3*   RDW 16.2*  --   --  18.9* 18.7*     --   --  189 192    < > = values in this interval not displayed. CHEMISTRIES:  Recent Labs     03/16/22  0630 03/16/22  0908 03/17/22 0616 03/18/22  0530   NA  --  145 144 149*   K  --  3.5 3.2* 3.7   CL  --  113* 112* 113*   CO2  --  22 24 26   BUN  --  31* 32* 30*   CREATININE  --  1.8* 1.5* 1.4*   GLUCOSE  --  97 134* 171*   PHOS  --  3.0  --   --    MG 1.7  --   --   --      PT/INR:No results for input(s): PROTIME, INR in the last 72 hours. APTT:No results for input(s): APTT in the last 72 hours. LIVER PROFILE:  Recent Labs     03/16/22 0908 03/17/22 0616 03/18/22  0530   AST 20 19 20   ALT 17 17 16   BILITOT 0.2 0.5 0.4   ALKPHOS 48 58 43       Imaging Last 24 Hours:  XR HUMERUS LEFT (MIN 2 VIEWS)    Result Date: 3/10/2022  EXAMINATION: TWO XRAY VIEWS OF THE LEFT HUMERUS 3/10/2022 9:31 am COMPARISON: None. HISTORY: ORDERING SYSTEM PROVIDED HISTORY: limited mobility TECHNOLOGIST PROVIDED HISTORY: Reason for exam:->limited mobility FINDINGS: There is no evidence of fracture or dislocation. No significant osteo-degenerative changes or other osseous abnormalities are identified. There is increased separation of the humeral head from the osseous glenoid and AC joint. This could represent subluxation. The presence of a large synovial effusion, hematoma, or mass cannot be excluded. CT scan of the left shoulder can be considered for further evaluation. Surgical clips are identified in the soft tissues about the mid to distal humerus. No fracture or dislocation. Increased separation of the humeral head from the osseous glenoid and AC joint, which could represent subluxation. A large synovial effusion, hematoma, or mass cannot be excluded.   CT scan of the left shoulder can be considered for further evaluation. CT HEAD WO CONTRAST    Result Date: 3/10/2022  EXAMINATION: CT OF THE HEAD WITHOUT CONTRAST  3/10/2022 3:29 pm TECHNIQUE: CT of the head was performed without the administration of intravenous contrast. Dose modulation, iterative reconstruction, and/or weight based adjustment of the mA/kV was utilized to reduce the radiation dose to as low as reasonably achievable. COMPARISON: February 25, 2022 HISTORY: ORDERING SYSTEM PROVIDED HISTORY: AMS TECHNOLOGIST PROVIDED HISTORY: Reason for exam:->AMS Has a \"code stroke\" or \"stroke alert\" been called? ->No FINDINGS: BRAIN/VENTRICLES: There are age related cortical atrophy and periventricular white matter ischemic changes. There is no acute intracranial hemorrhage, mass effect or midline shift. No abnormal extra-axial fluid collection. The gray-white differentiation is maintained without evidence of an acute infarct. There is no evidence of hydrocephalus. ORBITS: The visualized portion of the orbits demonstrate no acute abnormality. SINUSES: There is partial opacification of bilateral mastoid air cells. Mucosal thickening in the right sphenoid sinus. SOFT TISSUES/SKULL:  No acute abnormality of the visualized skull or soft tissues. No acute intracranial abnormality or hemorrhage. Bilateral mastoiditis and right sphenoid sinusitis. IR FLUORO GUIDED CVA DEVICE PLMT/REPLACE/REMOVAL    Result Date: 3/11/2022  PROCEDURE: ULTRASOUND GUIDED VASCULAR ACCESS. FLUOROSCOPY GUIDED PICC PLACEMENT 3/11/2022. HISTORY: ORDERING SYSTEM PROVIDED HISTORY: picc line/ medline TECHNOLOGIST PROVIDED HISTORY: Reason for exam:->picc line/ medline SEDATION: None FLUOROSCOPY DOSE AND TYPE OR TIME AND EXPOSURES: Fluoroscopy time equals 8.2 minutes. Total dose equals 116 mGy TECHNIQUE: The procedure was performed under ultrasound and fluoroscopic guidance. And ultrasound image and fluoroscopic image was obtained for medical records. Rachell Salazar FINDINGS: The patient's right arm was prepped and draped in a sterile fashion using a maximum sterile barrier technique. Following the uneventful administration of lidocaine I introduced a micro puncture needle into the right basilic vein using ultrasound guidance. Through the micropuncture needle a microwire was introduced into the vein. Over the microwire a peel-away sheath was placed within the vein. A catheter measurement was obtained. The catheter was then trimmed to 25 cm and introduced over the wire into the right basilic vein. The catheter tip was placed within the right subclavian vein. .  The peel-away sheath was then removed and the PICC line was secured to the skin. A sterile dressing was applied. The patient tolerated the procedure well and no complications. A fluoroscopic image was obtained which confirms position of the PICC line catheter. Successful placement of a dual lumen power PICC line. The tip of the PICC line catheter was placed within the distal right subclavian vein. Leti Mcclellan IMPRESSION: Successful ultrasound and fluoroscopy guided PICC placement     XR SHOULDER LEFT (MIN 2 VIEWS)    Result Date: 3/10/2022  EXAMINATION: THREE XRAY VIEWS OF THE LEFT SHOULDER 3/10/2022 9:31 am COMPARISON: None HISTORY: ORDERING SYSTEM PROVIDED HISTORY: limited mobility and bruising TECHNOLOGIST PROVIDED HISTORY: Reason for exam:->limited mobility and bruising FINDINGS: There is no evidence of fracture. There is increased separation of the humeral head from the osseous glenoid and the acromioclavicular joint, which could represent subluxation. Otherwise, the presence of a large synovial effusion, hematoma, or mass cannot be excluded. Clinical correlation is recommended. CT scan of the left shoulder can be performed for further evaluation. The heart is enlarged. Surgical clips are identified in the soft tissues surrounding the midportion of the humerus. No fracture.   Increased separation of the humeral head from the osseous glenoid and AC joint, which could represent subluxation. Otherwise, a large synovial effusion, hematoma, or mass cannot be excluded. Clinical correlation is recommended. CT scan of the left shoulder can be performed for further evaluation. Cardiomegaly. IR ULTRASOUND GUIDANCE VASCULAR ACCESS    Result Date: 3/11/2022  PROCEDURE: ULTRASOUND GUIDED VASCULAR ACCESS. FLUOROSCOPY GUIDED PICC PLACEMENT 3/11/2022. HISTORY: ORDERING SYSTEM PROVIDED HISTORY: picc line/ medline TECHNOLOGIST PROVIDED HISTORY: Reason for exam:->picc line/ medline SEDATION: None FLUOROSCOPY DOSE AND TYPE OR TIME AND EXPOSURES: Fluoroscopy time equals 8.2 minutes. Total dose equals 116 mGy TECHNIQUE: The procedure was performed under ultrasound and fluoroscopic guidance. And ultrasound image and fluoroscopic image was obtained for medical records. Hanane Crouch FINDINGS: The patient's right arm was prepped and draped in a sterile fashion using a maximum sterile barrier technique. Following the uneventful administration of lidocaine I introduced a micro puncture needle into the right basilic vein using ultrasound guidance. Through the micropuncture needle a microwire was introduced into the vein. Over the microwire a peel-away sheath was placed within the vein. A catheter measurement was obtained. The catheter was then trimmed to 25 cm and introduced over the wire into the right basilic vein. The catheter tip was placed within the right subclavian vein. .  The peel-away sheath was then removed and the PICC line was secured to the skin. A sterile dressing was applied. The patient tolerated the procedure well and no complications. A fluoroscopic image was obtained which confirms position of the PICC line catheter. Successful placement of a dual lumen power PICC line. The tip of the PICC line catheter was placed within the distal right subclavian vein. Hanane Crouch  IMPRESSION: Successful ultrasound and fluoroscopy guided PICC placement Assessment//Plan           Hospital Problems           Last Modified POA    * (Principal) Acute respiratory failure with hypoxia ([facility] 3/9/2022 Yes    Kidney transplanted 3/10/2022 Yes    Elevated troponin 3/10/2022 Yes    Paroxysmal atrial fibrillation ([address] 3/10/2022 Yes    Hypotension 3/10/2022 Yes    LBBB (left bundle branch block) 3/10/2022 Yes    History of DVT (deep vein thrombosis) 3/10/2022 Yes    Chronic anticoagulation 3/10/2022 Yes    Acute respiratory failure with hypoxemia ([facility] [address] 3/11/2022 Yes        Assessment:    Condition: In stable condition. Improving. (Sepsis ? Cause aspiration pneumonia  Was on zosyn and vanc , now on  levaquin ,   Acute respiratory failure secondary to above on o2 ,   Metabolic encephalopathy in immuno compromised host , on acyclovir for possible  herpes simplex  Encephalitis , mental status  Improved but slightly worse than yesterday  Metabolic acidosis , due to acute on chronic renal failure , creatinine better 1.4  cdiff colitis on  deficid  Left femoral and popliteal DVT on eliquis . S/p IVC filter placement   Acute on chronic renal failure , on  iv fluids  Creatinine better 1.4  hyperlpid , continue lipitor   Bipolar disorder on pristiq  And clozaril  Chronic renal  failure s/p renal transplant 2015 . On cellcept and cyclosporine   Hypothyroidism on levothyroxine . Hypotension resolved ,on midodrin as needed   Delirium / metabolic encephalopathy secondary to above , unable to have oral intake , NG tube , tolerating  TF, nepro 40 cc/ hour , speech recommended, soft diet and thin liquids , hold tube feed and see if patient will be awake enough to continue oral intake on her own if not will resume tube feed  Metabolic acidosis due to renal failure   On hco3   Malnutrition and edema , spa and lasix x3   Anemia of chronic disease transfuse 1 unit of PRBCS . Hb better 9.8   Hyponatremia, will increase free water intake through the NG tube     ).        Electronically signed by Vazquez Encarnacion MD on 3/11/22 at 8:21 PM EST

## 2022-03-18 NOTE — CARE COORDINATION
Ss note: 3/18/2022 9:03 AM No covid testing. WILL NEED RAPID COVID ON day of discharge. Per IDR pt not medically stable for discharge yet. Pt continues with NG tube, however per round may attempt oral intake today, pt is on 2 liters of oxygen, is on Difficid, (facility cannot accept on Difficid), pt in contact iso for c-diff. Plan is [facility] for skilled rehab under Medicare, pt has [facility] secondary, son in agreement with this plan. Will need UPDATED THERAPY notes within 24 hours of discharge, HENS and signed GINA. SW will follow.  AG Harris

## 2022-03-18 NOTE — PROGRESS NOTES
Physical Therapy Treatment Note/Plan of Care    Room #:  0340/3231-45  Patient Name: Ortega Grijalva  YOB: 1955  MRN: [id number]    Date of Service: 3/18/2022     Tentative placement recommendation: Subacute rehab  Equipment recommendation:  To be determined      Evaluating Physical Therapist: Julio Mcgowan, PT #[id number]      Specific Provider Orders/Date/Referring Provider :  03/09/22 2130   PT eval and treat Start: 03/09/22 2130, End: 03/09/22 2130, ONE TIME, Standing Count: 1 Occurrences, Erica Nuñez MD      Admitting Diagnosis:   Confusion [R41.0]  Acute respiratory failure with hypoxia ([address] [J96.01]  Acute respiratory failure with hypoxemia ([address] [J96.01]     altered mental status    Surgery: none  Visit Diagnoses       Codes    Confusion     R41.0    Encounter for imaging study to confirm nasogastric (NG) tube placement     Z01.89          Patient Active Problem List   Diagnosis    Peripheral vascular disease ([address]    Depression    Clotting disorder ([address]    Abnormal EKG    Cancer ([address]    Over weight    Kidney transplanted    ESRD (end stage renal disease) ([address]    Non morbid obesity due to excess calories    Hypertension    Leg swelling    Lymphedema of both lower extremities    Acute gout involving toe of right foot    Sepsis secondary to UTI ([address]    Acute kidney injury superimposed on CKD ([address]    Thyroid disease    Acute on chronic combined systolic (congestive) and diastolic (congestive) heart failure ([address]    Sepsis ([address]    Acute renal failure ([address]    CECLIY (acute kidney injury) ([address]    Ischemic bowel disease ([address]    Altered mental status    Coma ([address]    Acute respiratory failure with hypoxia ([address]    Hypoxia    Acute metabolic encephalopathy    Acute deep vein thrombosis (DVT) of femoral vein of left lower extremity (HCC)    Anemia    Positive blood culture    Difficult intravenous access    Elevated troponin    Paroxysmal atrial fibrillation (HCC)    Hypotension    LBBB (left bundle branch block)    History of DVT (deep vein thrombosis)    Chronic anticoagulation    Acute respiratory failure with hypoxemia (HCC)        ASSESSMENT of Current Deficits Patient exhibits decreased strength, balance, endurance, range of motion and pain left upper extremity  impairing functional mobility, transfers, tolerance to activity and participation Patient able to assisted with bed mobility and sitting EOB. Skilled intervention of 2 clinicians required to facilitate participation, promote active engagement to progress towards goals and prevent injury of patient and staff. Patient MaxAx2 for all function and MaxA for sitting edge of bed. Patient requires continued skilled physical therapy to address concerns listed above for increased safety and function at discharge. PHYSICAL THERAPY  PLAN OF CARE       Physical therapy plan of care is established based on physician order,  patient diagnosis and clinical assessment    Current Treatment Recommendations:    -Bed Mobility: Lower extremity exercises , Upper extremity exercises  and Trunk control activities   -Sitting Balance: Hands on support to maintain midline , Facilitate active trunk muscle engagement  and Facilitate postural control in all planes   -Endurance: Utilize Supervised activities to increase level of endurance to allow for safe functional mobility including transfers and gait     PT long term treatment goals are located in below grid    Patient and or family understand(s) diagnosis, prognosis, and plan of care. Frequency of treatments: Patient will be seen  3-5 times/week.          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    Cerebrovascular disease     Clotting disorder ([facility] 1985    thrombophlebitis after c section delivery    Depression     15 years followed by dr Saniya Espinal Hemodialysis patient [facility])     History of blood transfusion     Hyperlipidemia     Hypertension     Hypothyroidism     Leg swelling 09/03/2020    Lymphedema of both lower extremities 09/03/2020    Peripheral vascular disease ([facility]     Renal failure 11/2012    renal transplant    Thrombophlebitis     after c section delivery    Thyroid disease      Past Surgical History:   Procedure Laterality Date   Maida Desirien 58    one normal delivery    COLECTOMY N/A 1/15/2022    DIAGNOSTIC  LAPAROSCOPY LYSIS OF ADHESIONS performed by Kamila Laird MD at [facility] CATH LAB PROCEDURE  2006    normal coronaries and 1996 normal coronaries    DIALYSIS FISTULA CREATION  march and july 2012    phase one and two patient will start dialysis when needed   [address]    transfemoral venous bypass grafts    IR IVC FILTER PLACEMENT W IMAGING  2/26/2022    IR IVC FILTER PLACEMENT W IMAGING 2/26/2022 [facility] SPECIAL PROCEDURES    KIDNEY TRANSPLANT  2016    KIDNEY TRANSPLANT  2015    KIDNEY TRANSPLANT  2015    PARATHYROIDECTOMY  2006    left parathyroid  implant and parathyroidectomy    TRANSESOPHAGEAL ECHOCARDIOGRAM N/A 2/28/2022    TRANSESOPHAGEAL ECHOCARDIOGRAM WITH BUBBLE STUDY performed by Annie Braga MD at [address]:    Precautions: Use lift equipment for lifting patient , falls and alarm ,  hx kidney transplant 2015, contact maribell-ashliediff, ng tube    Social history: Patient from  in a skilled nursing facility  Prior to multiple hospitalizations lived alone in a apartment with Elevator to enter   Tub shower grab bars    Equipment owned: Rollator and Shower chair,       AM-PAC Basic Mobility        AM-PAC Mobility Inpatient   How much difficulty turning over in bed?: A Lot  How much difficulty sitting down on / standing up from a chair with arms?: Unable  How much difficulty moving from lying on back to sitting on side of bed?: A Lot  How much help from another person moving to and from a bed to a chair?: Total  How much help from another person needed to walk in hospital room?: Total  How much help from another person for climbing 3-5 steps with a railing?: Total  AM-PAC Inpatient Mobility Raw Score : 8  AM-PAC Inpatient T-Scale Score : 28.52  Mobility Inpatient CMS 0-100% Score: 86.62  Mobility Inpatient CMS G-Code Modifier : CM    Nursing cleared patient for PT treatment. .   OBJECTIVE;   Initial Evaluation  Date: 3/10/2022 Treatment Date:  3/18/2022     Short Term/ Long Term   Goals   Was pt agreeable to Eval/treatment? Yes Yes To be met in 3 days   Pain level   5/10  Left upper extremity  No number assigned    Bed Mobility    Rolling: Dependent assist of 1    Supine to sit: Dependent of  2    Sit to supine: Dependent of  2    Scooting: Dependent of  2   Rolling: Maximal assist of  2   Supine to sit: Maximal assist of  2   Sit to supine: Maximal assist of  2   Scooting: Maximal assist of  2    Rolling: Moderate assist of  2    Supine to sit: Moderate assist of  2    Sit to supine: Moderate assist of  2    Scooting: Moderate assist of  2     Transfers Sit to stand: Not assessed    Sit to stand: Not assessed  d/t to pt overall debility, decreased activity tolerance, balance deficits, safety and fall risk.        Sit to stand: Maximal assist of  2     ROM Limited bilateral upper extremities and lower extremities    Increase range of motion 10% of affected joints    Strength BUE:  refer to OT eval  RLE:  2+/5  LLE:  2+/5  Increase strength in affected mm groups by 1/3 grade   Balance Sitting EOB:  poor  Max progressing to mod assist   Dynamic Standing:  not assessed Sitting EOB: poor, max assist d/t posterior lean   Dynamic Standing: not assessed    Sitting EOB:  fair    Dynamic Standing: fair -     Patient is Alert & Oriented x person and place and follows one step directions    Sensation:  Patient  denies numbness/tingling   Edema:  yes bilateral lower extremities and bilateral upper extremities   Endurance: fair       Vitals: 2 liters nasal cannula   Blood Pressure at rest  Blood Pressure during session    Heart Rate at r est   Heart Rate during session     SPO2 at rest %  SPO2 during session  %     Patient education  Patient educated on role of Physical Therapy, risks of immobility, safety and plan of care,  importance of mobility while in hospital , ankle pumps, quad set and glut set for edema control, blood clot prevention and positioning for skin integrity and comfort     Patient response to education:   Pt verbalized understanding Pt demonstrated skill Pt requires further education in this area   Yes Partial Yes      Treatment:  Patient practiced and was instructed/facilitated in the following treatment: Patient assisted to EOB. Sat EOB for 15 minutes with MaxA for support d/t posterior lean for dynamic balance and increasing activity tolerance. Patient assisted back to supine. Rolled x3 reps for replacing TAPS system. Assisted to [facility], max assist x2. AAROM/PROM exercises to all joints. Pt positioned for skin integrity and edema control. Therapeutic Exercises: PROM ankle pumps, long arc quad and seated marching  x 10-15 reps. At end of session, patient in bed with alarm call light and phone within reach,  all lines and tubes intact, nursing notified. Patient would benefit from continued skilled Physical Therapy to improve functional independence and quality of life.          Patient's/ family goals   Return to [facility]    Time in  138  Time out 209    Total Treatment Time 31 minutes    CPT codes:  Therapeutic activities ()   15 minutes  1 unit(s)  Non billable time 16 minutes co-treat with OT    Brigette Galeano, PTA #[id number]

## 2022-03-18 NOTE — PROGRESS NOTES
OCCUPATIONAL THERAPY TREATMENT NOTE     [facility]  Abdiaziz Tapialdeaamparo Velasquez. [place]         Date:3/18/2022  Patient Name: Jose Alanis  MRN: [id number]  : 1955  Room: [address] [place] [address]            Evaluating OT: Cynthia Gagnon OTR/L; [id number]      Referring Provider and Specific Provider Orders/Date:      22   OT eval and treat  Start:  22,   End:  22, Donny Leyva,   Standing Count:  1 Occurrences,   R         Hermes Clark MD      Placement Recommendation: Subacute          Diagnosis:   1. Acute respiratory failure with hypoxia ([address]    2.  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    Cerebrovascular disease      Clotting disorder ([address] 1985     thrombophlebitis after c section delivery    Depression       15 years followed by dr Fay Babinski Hemodialysis patient [facility])      History of blood transfusion      Hyperlipidemia      Hypertension      Hypothyroidism      Leg swelling 2020    Lymphedema of both lower extremities 2020    Peripheral vascular disease ([address]      Renal failure 2012     renal transplant    Thrombophlebitis       after c section delivery    Thyroid disease              Past Surgical History         Past Surgical History:   Procedure Laterality Date     SECTION   1985     one normal delivery    COLECTOMY N/A 1/15/2022     DIAGNOSTIC  LAPAROSCOPY LYSIS OF ADHESIONS performed by Jesse Brown MD at [address] [facility] CATH LAB PROCEDURE        normal coronaries and 1996 normal coronaries    DIALYSIS FISTULA CREATION   march and july 2012     phase one and two patient will start dialysis when needed   [place]     transfemoral venous bypass grafts    IR IVC FILTER PLACEMENT W IMAGING   2/26/2022     IR IVC FILTER PLACEMENT W IMAGING 2/26/2022 [facility] SPECIAL PROCEDURES    KIDNEY TRANSPLANT   2016    KIDNEY TRANSPLANT   2015    KIDNEY TRANSPLANT   2015    PARATHYROIDECTOMY   2006     left parathyroid  implant and parathyroidectomy    TRANSESOPHAGEAL ECHOCARDIOGRAM N/A 2/28/2022     TRANSESOPHAGEAL ECHOCARDIOGRAM WITH BUBBLE STUDY performed by Florecita Odom MD at [facility] ENDOSCOPY         Precautions:  Fall Risk, L UE edema, non-ambulatory      Assessment of current deficits:     [x]? Functional mobility            [x]?ADLs           [x]? Strength                   []?Cognition    [x]? Functional transfers          [x]? IADLs         []? Safety Awareness   [x]? Endurance    [x]? Fine Coordination              [x]? Balance      []? Vision/perception    []? Sensation      [x]? Gross Motor Coordination  [x]? ROM           []? Delirium                   [x]?  Motor Control      OT PLAN OF CARE   OT POC based on physician orders, patient diagnosis and results of clinical assessment     Frequency/Duration 1-3 days/wk for 2 weeks PRN      Specific OT Treatment Interventions to include:   * Instruction/training on adapted ADL techniques and AE recommendations to increase functional independence within precautions       * Training on energy conservation strategies, correct breathing pattern and techniques to improve independence/tolerance for self-care routine  * Functional transfer/mobility training/DME recommendations for increased independence, safety, and fall prevention  * Patient/Family education to increase follow through with safety techniques and functional independence  * Recommendation of environmental modifications for increased safety with functional transfers/mobility and ADLs  * Splinting/positioning for increased function, prevention of contractures, and improve skin integrity  * Therapeutic exercise to improve motor endurance, ROM, and functional strength for ADLs/functional transfers  * Therapeutic activities to facilitate/challenge dynamic balance, stand tolerance for increased safety and independence with ADLs  * Positioning to improve skin integrity, interaction with environment and functional independence     Recommended Adaptive Equipment: none       Home Living: [facility] [place] [id number] owned: [facility] equipment      Prior Level of Function: Dependnet with ADLs , Dependent with IADLs; non-ambulatory     Driving: no  Occupation: not working     Pain Level: pain in L UE, pt could not rate level of pain; Nursing notified.        Cognition: A&O: 4/4; Follows 3 step directions; pt demo'd increased alertness this date               Memory: fair               Sequencing: fair               Problem solving: fair               Judgement/safety: fair      [facility]   AM-PAC Daily Activity Inpatient   How much help for putting on and taking off regular lower body clothing?: Total  How much help for Bathing?: A Lot  How much help for Toileting?: Total  How much help for putting on and taking off regular upper body clothing?: A Lot  How much help for taking care of personal grooming?: A Lot  How much help for eating meals?: A Lot  AM-[facility] Inpatient Daily Activity Raw Score: 10  AM-PAC Inpatient ADL T-Scale Score : 27.31  ADL Inpatient CMS 0-100% Score: 74.7  ADL Inpatient CMS G-Code Modifier : CL                Functional Assessment:     Initial Eval Status  Date: 3/10/22 Treatment Status  Date: 3/18/22 STGs = LTGs  Time frame: 10-14 days   Feeding Maximal Assist   MAX A to bring cup to mouth and take drink; MAX A to spoon ice chips from cup bring to mouth and take bite; pt requiring assist to hold R UE against gravity  Minimal Assist Grooming Dependent to brush hair and work ofn removing large knot from back of hair  MAX A at bed level to bring hand to face and wash face, to complete AROM to reach back of head, MAX A to lift L UE to engage in grooming tasks     Dep to complete seated EOB  Minimal Assist    UB Dressing Maximal assist  MAX A [facility] gown simulated at bed level:    Dep seated EOB  Minimal Assist    LB Dressing Dependent  Dep  N.A     Bathing Maximal Assist MAX A simulated at bed level  Moderate Assist   Toileting Dependent for hygiene as pt was incontinent of bowel  n/t Moderate Assist    Bed Mobility  Supine to sit: Dependent x 2    Sit to supine: Dependent x 2   MAX A x2 supine<>sit pt requiring assist to bring B LE in/out of bed, raising/lower UB using tap system as sling v/c's for hand placement  Supine to sit: Minimal Assist x 2    Sit to supine: Minimal Assist x 2     Balance Sitting:     Static: poor at EOB with maximal assist progressing to minimal assist     Dynamic: poor   Standing: N/T  Sitting:   Static: MAX A   Dynamic: MAX A   Standing: n/t      Activity Tolerance Poor   poor  fair    Visual/  Perceptual Glasses: no                       Pt  sitting EOB engaged in B UE AAROM 1 x 10  reps in all planes fist pumps, elbow flexion/extension, shoulder flexion/extension focusing on edema management, UE strength/endurance to increase independence with ADL tasks. Comments: Upon arrival pt supine in bed, agreeable to therapy session. Pt educated with regards to bed mobility, B UE AAROM, edema management, static/dynamic sitting balance, hand placement, importance of increased activity, bathing techniques/participation, UE dressing, grooming tasks. At end of session pt supine in bed, with HOB elevated past 30* for tube feed,  all lines and tubes intact, call light within reach. · Pt has made fair- progress towards set goals.    · Continue with current plan of care      Treatment Time In:9705 Treatment Time XGQ:2399               Treatment Charges: Mins Units   Ther Ex  33550 10  1   Manual Therapy 47154     Thera Activities 45422     ADL/Home Mgt [id number] 5    Neuro Re-ed [id number]     Group Therapy      Orthotic manage/training  32009     Non-Billable Time 15    Total Timed Treatment  31 [address] [address]

## 2022-03-18 NOTE — PROGRESS NOTES
[facility] MD Douglas Nair MD Florette Muzzy, MD Cortez Sinner MD  Progress Note    Patient's Name: Ronni Cisneros  7:46 PM  3/18/2022    Subjective  3/13 : seen in her room  Weak decondioned lying flat in bed , poor po intake , clinically euvolemic   3/14: NG tube not functioning, just replaced. No meds all day as a result. IV fluids been stopped for few hours, as well. 3/15 : seen today , o2/nc . Appear comfortable . TF going at goal . Per RN pt is more alert and awake   UE edema B/L 2+ .   3/17 : clinically better wake , talktive ,edema in UE better on TF     3/18 : on RA , alert awake ,oriented . No LE edema . Appear comfortable     History of Present Ilness:      Patient has h/o preserved left ventricular systolic function (CLEMENCIA  1/07/3083), stress test with no significant reversibility 6/16/2015, paroxysmal atrial fibrillation (on Eliquis), chronic left bundle branch block, HTN, HLD,  s/p kidney transplant,CKD, H/o DVT, s/p IVC on AC now, PVD, s/p thyroidectomy for cancer, gout, obesity     She was recently treated in hospitalfrom 2/25/2020 to 3/7/2022 because of acute hypoxic respiratory failure likely due to aspiration pneumonia with positive blood culture and acute metabolic encephalopathy     Patient was brought from [facility] and rehab facility to ED of [facility] on 3/9/22 due to new mental status changes. Nephrology asked to see for CECILY /CKD   Pt has hx of cadaveric kidney transplant 7 years back   Her baseline cr has been 1.2-1.6 with a lot of fluctuation in contest of dehydration . Pt seen in her room , she is very weak and deconditoned to my eyes she has lost a lot of weight .    She has a hassan in place   She has poor po intake   She is euvolemic to mildly hypovolemic       Allergies:  Macrodantin [nitrofurantoin macrocrystal] and Sulfa antibiotics    Current Medications:    sodium bicarbonate tablet 650 mg, Daily  cycloSPORINE (NEORAL) 100 MG/ML microemulsion solution 100 mg, BID  0.9 % sodium chloride infusion, PRN  venlafaxine (EFFEXOR) tablet 37.5 mg, BID WC  epoetin casey-epbx (RETACRIT) injection 2,000 Units, Q7 Days   And  epoetin casey-epbx (RETACRIT) injection 3,000 Units, Q7 Days  mycophenolate (CELLCEPT) 200 MG/ML suspension 500 mg, BID  magnesium oxide (MAG-OX) tablet 400 mg, Daily  potassium chloride (KLOR-CON M) extended release tablet 20 mEq, Once  docusate (COLACE) 50 MG/5ML liquid 100 mg, BID  ipratropium-albuterol (DUONEB) nebulizer solution 1 ampule, Q4H  acyclovir (ZOVIRAX) 350 mg in dextrose 5 % 50 mL IVPB, Q12H  midodrine (PROAMATINE) tablet 5 mg, BID PRN  lidocaine 1 % injection 5 mL, Once  sodium chloride flush 0.9 % injection 5-40 mL, 2 times per day  sodium chloride flush 0.9 % injection 5-40 mL, PRN  0.9 % sodium chloride infusion, PRN  heparin flush 100 UNIT/ML injection 100 Units, 2 times per day  heparin flush 100 UNIT/ML injection 100 Units, PRN  [Held by provider] metoprolol tartrate (LOPRESSOR) tablet 12.5 mg, BID  Fidaxomicin (DIFICID) tablet 200 mg, BID  levoFLOXacin (LEVAQUIN) 750 MG/150ML infusion 750 mg, Q48H  apixaban (ELIQUIS) tablet 5 mg, BID  ondansetron (ZOFRAN-ODT) disintegrating tablet 4 mg, Q8H PRN  atorvastatin (LIPITOR) tablet 10 mg, Nightly  cloZAPine (CLOZARIL) tablet 50 mg, Nightly  levothyroxine (SYNTHROID) tablet 50 mcg, Daily  pantoprazole (PROTONIX) tablet 40 mg, QAM AC  predniSONE (DELTASONE) tablet 5 mg, Daily  allopurinol (ZYLOPRIM) tablet 959 mg, Daily  folic acid (FOLVITE) tablet 1 mg, Daily        Review of Systems:   Constitutional: weakness   Eyes: no eye pain , no itching , no drainage  Ears, nose, mouth, throat, and face: no ear ,nose pain , hearing is ok ,no nasal drainage   Respiratory: no sob ,no cough ,no wheezing . Cardiovascular: no chest pain , no palpitation ,no sob . Gastrointestinal: no nausea, vomiting , constipation , no abdominal pain .    Genitourinary:no urinary retention , no burning , dysuria . No polyuria   Hematologic/lymphatic: no bleeding , no cougulation issues . Musculoskeletal:no joint pain , no swelling . Neurological: no headaches ,no weakness , no numbness . Endocrine: no thirst , no weight issues . Physical exam:   Vital signs BP (!) 148/76   Pulse 96   Temp 97.5 °F (36.4 °C) (Oral)   Resp 18   Ht 5' 2\" (1.575 m)   Wt 226 lb (102.5 kg)   SpO2 97%   BMI 41.34 kg/m²   Gen : In NAD , appears stated age . Head : NC/AT, EOMI, sclera and conjunctive are clear and anicteric. Mucous membranes dry  Neck : supple , no thyromegaly noted . Trachea midline  CV : regular rhythm, normal S1 and S2, No M/R/G . Lungs: CTAB , no wheezing , good air entry in all fields, though poor inspiratory effort  Abd : soft , NT , BS +   Skin : soft, dry . Neuro : CN  II-XII grossly intact , no focal neurologic deficit .    Psych : affect flat, minimally responsive, mostly to tactile stimulation, does not answer questions or follow commands    Data:   Labs:  CBC with Differential:    Lab Results   Component Value Date    WBC 8.7 03/18/2022    RBC 3.15 03/18/2022    HGB 9.8 03/18/2022    HCT 31.9 03/18/2022     03/18/2022    .3 03/18/2022    MCH 31.1 03/18/2022    MCHC 30.7 03/18/2022    RDW 18.7 03/18/2022    NRBC 1.0 03/18/2022    METASPCT 2.0 03/18/2022    LYMPHOPCT 7.0 03/18/2022    MONOPCT 2.0 03/18/2022    MYELOPCT 2.0 03/18/2022    BASOPCT 0.0 03/18/2022    MONOSABS 0.17 03/18/2022    LYMPHSABS 0.61 03/18/2022    EOSABS 0.35 03/18/2022    BASOSABS 0.00 03/18/2022     CMP:    Lab Results   Component Value Date     03/18/2022    K 3.7 03/18/2022    K 4.7 03/09/2022     03/18/2022    CO2 26 03/18/2022    BUN 30 03/18/2022    CREATININE 1.4 03/18/2022    GFRAA 46 03/18/2022    LABGLOM 38 03/18/2022    GLUCOSE 171 03/18/2022    PROT 4.7 03/18/2022    LABALBU 3.1 03/18/2022    CALCIUM 10.6 03/18/2022    BILITOT 0.4 03/18/2022    ALKPHOS 43 03/18/2022    AST 20 03/18/2022    ALT 16 03/18/2022     Ionized Calcium:  No results found for: IONCA  Magnesium:    Lab Results   Component Value Date    MG 1.7 03/16/2022     Phosphorus:    Lab Results   Component Value Date    PHOS 3.0 03/16/2022     U/A:    Lab Results   Component Value Date    COLORU Yellow 03/09/2022    PHUR 5.0 03/09/2022    WBCUA 1-3 03/09/2022    RBCUA NONE 03/09/2022    RBCUA 10-20 03/12/2014    YEAST Present 04/09/2021    BACTERIA MODERATE 03/09/2022    CLARITYU Clear 03/09/2022    SPECGRAV 1.025 03/09/2022    LEUKOCYTESUR Negative 03/09/2022    UROBILINOGEN 0.2 03/09/2022    BILIRUBINUR Negative 03/09/2022    BLOODU Negative 03/09/2022    GLUCOSEU Negative 03/09/2022     Microalbumen/Creatinine ratio:  No components found for: RUCREAT  Iron Saturation:  No components found for: PERCENTFE  TIBC:    Lab Results   Component Value Date    TIBC 92 03/17/2022     FERRITIN:    Lab Results   Component Value Date    FERRITIN 745 03/17/2022        Imaging:  XR CHEST ABDOMEN NG PLACEMENT   Final Result   The enteric tube terminates in the stomach. XR CHEST PORTABLE   Final Result   Increased opacities in left lung base related to increasing subsegmental   atelectasis or pleural effusion. XR ABDOMEN FOR NG/OG/NE TUBE PLACEMENT   Final Result   Satisfactory position of the enteric tube. XR CHEST PORTABLE   Final Result   Addendum 1 of 1   ADDENDUM:   The floor reports slow nasogastric tube output. Based on positioning on   plain film, recommend further advancement. Final   1. Possible right pleural effusion and right basilar atelectasis. 2. Cardiomegaly without pulmonary venous hypertension. 3. Possible hiatal hernia. XR ABDOMEN FOR NG/OG/NE TUBE PLACEMENT   Final Result   Enteric tube tip in the fundus of the stomach. US RETROPERITONEAL COMPLETE   Final Result   No hydronephrosis of the transplant kidney.       The bladder is decompressed by a Chin catheter. IR FLUORO GUIDED CVA DEVICE PLMT/REPLACE/REMOVAL   Final Result   Successful placement of a dual lumen power PICC line. The tip of the PICC   line catheter was placed within the distal right subclavian vein. Dora Veloz IMPRESSION:   Successful ultrasound and fluoroscopy guided PICC placement         IR ULTRASOUND GUIDANCE VASCULAR ACCESS   Final Result   Successful placement of a dual lumen power PICC line. The tip of the PICC   line catheter was placed within the distal right subclavian vein. Dora Veloz IMPRESSION:   Successful ultrasound and fluoroscopy guided PICC placement         CT HEAD WO CONTRAST   Final Result   No acute intracranial abnormality or hemorrhage. Bilateral mastoiditis and right sphenoid sinusitis. XR SHOULDER LEFT (MIN 2 VIEWS)   Final Result   No fracture. Increased separation of the humeral head from the osseous   glenoid and AC joint, which could represent subluxation. Otherwise, a large   synovial effusion, hematoma, or mass cannot be excluded. Clinical   correlation is recommended. CT scan of the left shoulder can be performed   for further evaluation. Cardiomegaly. XR HUMERUS LEFT (MIN 2 VIEWS)   Final Result   No fracture or dislocation. Increased separation of the humeral head from   the osseous glenoid and AC joint, which could represent subluxation. A large   synovial effusion, hematoma, or mass cannot be excluded. CT scan of the left   shoulder can be considered for further evaluation. XR CHEST PORTABLE   Final Result   1. Right perihilar discoid atelectasis. 2. There are no findings of failure or pneumonia. Assessment    -Acute kidney injury : improving     -Hyperchrloremic acidosis :better .  Continue nahco3 tablets     -Hx of  donor kidney transplant : continue current immunosuppressent     -Immunosuppression host :  continue current immunosuppressent     -Anaemia of chronic disease : some of the anaemia is dilutional . continue retacrit 5000 u sc q week     -Edema mainly in UE : better . With Na going up hold lasix today .      -Hypo Magensiemia : replace     -severe deconditioning : PT/OT         Electronically signed by Maricel Vaca MD on 3/18/2022

## 2022-03-18 NOTE — PROGRESS NOTES
Speech Language Pathology      NAME:  Mavis Akers  :  1955  DATE: 3/18/2022  ROOM:  [facility]/5308-    Patient seen for dysphagia therapy 15 minutes x2 initial attempt patient not waking up very well. Would open eyes and respond but did not maintain alertness for safe PO attempt. Second session staff found SLP and reported she was not fully awake and talking to them  Patient agreeable to sitting up in bed and kept eyes open and demonstrated good recall of recent events including PO intake from yesterday. Patient demonstrated good tolerance of puree, solids and thin liquids. Does need cues to slow rate of intake with thin liquids via straw. She is aware that she drinks too rapidly at times. Recommend diet be initiated Soft and bite size consistency solids (IDDSI level 6) with  thin liquids (IDDSI level 0) discussed with nursing.  Will continue POC      Confusion [R41.0]  Acute respiratory failure with hypoxia ([facility] [address] [J96.01]  Acute respiratory failure with hypoxemia [facility]) [J96.01]    [address]  dysphagia tx x2    Arthurine Coad MSCCC/SLP  Speech Language Pathologist  [id number]

## 2022-03-18 NOTE — PROGRESS NOTES
Progress Note  Date:3/17/2022       Room:0415/0415-01  Patient Macario Lopez     YOB: 1955     Age:66 y.o. Subjective    Subjective:  Symptoms:  Improved. (Alert and Awake oriented x 2 ). Diet:  Dietary issues: on TF. Activity level: Impaired due to weakness. Pain:  She reports no pain. Review of Systems  Objective         Vitals Last 24 Hours:  TEMPERATURE:  Temp  Av.7 °F (36.5 °C)  Min: 97.5 °F (36.4 °C)  Max: 98 °F (36.7 °C)  RESPIRATIONS RANGE: Resp  Av  Min: 18  Max: 18  PULSE OXIMETRY RANGE: SpO2  Av.5 %  Min: 93 %  Max: 97 %  PULSE RANGE: Pulse  Av  Min: 92  Max: 105  BLOOD PRESSURE RANGE: Systolic (88IGR), DOC:407 , Min:129 , PVD:144   ; Diastolic (43QXU), BTT:70, Min:61, Max:85    I/O (24Hr): Intake/Output Summary (Last 24 hours) at 3/17/2022 2127  Last data filed at 3/17/2022 2001  Gross per 24 hour   Intake 1845.16 ml   Output 3670 ml   Net -1824.84 ml     Objective:  General Appearance: In no acute distress and comfortable. Vital signs: (most recent): Blood pressure 129/85, pulse 102, temperature 98 °F (36.7 °C), temperature source Oral, resp. rate 18, height 5' 2\" (1.575 m), weight 226 lb (102.5 kg), SpO2 97 %. Vital signs are normal.    Output: Producing urine. HEENT: Normal HEENT exam.    Lungs:  Normal effort and normal respiratory rate. There are decreased breath sounds. Heart: Normal rate. Regular rhythm. Positive for murmur. Abdomen: Abdomen is soft and distended. Bowel sounds are normal.   There is no abdominal tenderness. Extremities: (Edema +2   Both LL )  Pulses: Distal pulses are intact. Neurological: Patient is alert. (Awake , talking very reasonable conversation ). Pupils:  Pupils are equal, round, and reactive to light.       Labs/Imaging/Diagnostics    Labs:  CBC:  Recent Labs     03/15/22  0520 03/15/22  0520 22  0630 22  2345 22  0615   WBC 4.8  --  4.4*  --  7.5   RBC 2.52*  --  2.19*  --  2.95*   HGB 7.9*   < > 7.1* 8.8* 9.1*   HCT 27.4*   < > 23.6* 28.8* 30.4*   .7*  --  107.8*  --  103.1*   RDW 15.7*  --  16.2*  --  18.9*     --  219  --  189    < > = values in this interval not displayed. CHEMISTRIES:  Recent Labs     03/15/22  0520 03/16/22  0630 03/16/22  0908 03/17/22  0616     --  145 144   K 3.9  --  3.5 3.2*   *  --  113* 112*   CO2 17*  --  22 24   BUN 36*  --  31* 32*   CREATININE 2.3*  --  1.8* 1.5*   GLUCOSE 118*  --  97 134*   PHOS 3.4  --  3.0  --    MG 1.2* 1.7  --   --      PT/INR:No results for input(s): PROTIME, INR in the last 72 hours. APTT:No results for input(s): APTT in the last 72 hours. LIVER PROFILE:  Recent Labs     03/15/22  0520 03/16/22  0908 03/17/22  0616   AST 23 20 19   ALT 17 17 17   BILITOT 0.4 0.2 0.5   ALKPHOS 61 48 58       Imaging Last 24 Hours:  XR HUMERUS LEFT (MIN 2 VIEWS)    Result Date: 3/10/2022  EXAMINATION: TWO XRAY VIEWS OF THE LEFT HUMERUS 3/10/2022 9:31 am COMPARISON: None. HISTORY: ORDERING SYSTEM PROVIDED HISTORY: limited mobility TECHNOLOGIST PROVIDED HISTORY: Reason for exam:->limited mobility FINDINGS: There is no evidence of fracture or dislocation. No significant osteo-degenerative changes or other osseous abnormalities are identified. There is increased separation of the humeral head from the osseous glenoid and AC joint. This could represent subluxation. The presence of a large synovial effusion, hematoma, or mass cannot be excluded. CT scan of the left shoulder can be considered for further evaluation. Surgical clips are identified in the soft tissues about the mid to distal humerus. No fracture or dislocation. Increased separation of the humeral head from the osseous glenoid and AC joint, which could represent subluxation. A large synovial effusion, hematoma, or mass cannot be excluded. CT scan of the left shoulder can be considered for further evaluation.      CT HEAD WO CONTRAST    Result Date: 3/10/2022  EXAMINATION: CT OF THE HEAD WITHOUT CONTRAST  3/10/2022 3:29 pm TECHNIQUE: CT of the head was performed without the administration of intravenous contrast. Dose modulation, iterative reconstruction, and/or weight based adjustment of the mA/kV was utilized to reduce the radiation dose to as low as reasonably achievable. COMPARISON: February 25, 2022 HISTORY: ORDERING SYSTEM PROVIDED HISTORY: AMS TECHNOLOGIST PROVIDED HISTORY: Reason for exam:->AMS Has a \"code stroke\" or \"stroke alert\" been called? ->No FINDINGS: BRAIN/VENTRICLES: There are age related cortical atrophy and periventricular white matter ischemic changes. There is no acute intracranial hemorrhage, mass effect or midline shift. No abnormal extra-axial fluid collection. The gray-white differentiation is maintained without evidence of an acute infarct. There is no evidence of hydrocephalus. ORBITS: The visualized portion of the orbits demonstrate no acute abnormality. SINUSES: There is partial opacification of bilateral mastoid air cells. Mucosal thickening in the right sphenoid sinus. SOFT TISSUES/SKULL:  No acute abnormality of the visualized skull or soft tissues. No acute intracranial abnormality or hemorrhage. Bilateral mastoiditis and right sphenoid sinusitis. IR FLUORO GUIDED CVA DEVICE PLMT/REPLACE/REMOVAL    Result Date: 3/11/2022  PROCEDURE: ULTRASOUND GUIDED VASCULAR ACCESS. FLUOROSCOPY GUIDED PICC PLACEMENT 3/11/2022. HISTORY: ORDERING SYSTEM PROVIDED HISTORY: picc line/ medline TECHNOLOGIST PROVIDED HISTORY: Reason for exam:->picc line/ medline SEDATION: None FLUOROSCOPY DOSE AND TYPE OR TIME AND EXPOSURES: Fluoroscopy time equals 8.2 minutes. Total dose equals 116 mGy TECHNIQUE: The procedure was performed under ultrasound and fluoroscopic guidance. And ultrasound image and fluoroscopic image was obtained for medical records. Latimer Hilt  FINDINGS: The patient's right arm was prepped and draped in a sterile fashion using a maximum sterile barrier technique. Following the uneventful administration of lidocaine I introduced a micro puncture needle into the right basilic vein using ultrasound guidance. Through the micropuncture needle a microwire was introduced into the vein. Over the microwire a peel-away sheath was placed within the vein. A catheter measurement was obtained. The catheter was then trimmed to 25 cm and introduced over the wire into the right basilic vein. The catheter tip was placed within the right subclavian vein. .  The peel-away sheath was then removed and the PICC line was secured to the skin. A sterile dressing was applied. The patient tolerated the procedure well and no complications. A fluoroscopic image was obtained which confirms position of the PICC line catheter. Successful placement of a dual lumen power PICC line. The tip of the PICC line catheter was placed within the distal right subclavian vein. Calvin Gil IMPRESSION: Successful ultrasound and fluoroscopy guided PICC placement     XR SHOULDER LEFT (MIN 2 VIEWS)    Result Date: 3/10/2022  EXAMINATION: THREE XRAY VIEWS OF THE LEFT SHOULDER 3/10/2022 9:31 am COMPARISON: None HISTORY: ORDERING SYSTEM PROVIDED HISTORY: limited mobility and bruising TECHNOLOGIST PROVIDED HISTORY: Reason for exam:->limited mobility and bruising FINDINGS: There is no evidence of fracture. There is increased separation of the humeral head from the osseous glenoid and the acromioclavicular joint, which could represent subluxation. Otherwise, the presence of a large synovial effusion, hematoma, or mass cannot be excluded. Clinical correlation is recommended. CT scan of the left shoulder can be performed for further evaluation. The heart is enlarged. Surgical clips are identified in the soft tissues surrounding the midportion of the humerus. No fracture.   Increased separation of the humeral head from the osseous glenoid and AC joint, which could represent subluxation. Otherwise, a large synovial effusion, hematoma, or mass cannot be excluded. Clinical correlation is recommended. CT scan of the left shoulder can be performed for further evaluation. Cardiomegaly. IR ULTRASOUND GUIDANCE VASCULAR ACCESS    Result Date: 3/11/2022  PROCEDURE: ULTRASOUND GUIDED VASCULAR ACCESS. FLUOROSCOPY GUIDED PICC PLACEMENT 3/11/2022. HISTORY: ORDERING SYSTEM PROVIDED HISTORY: picc line/ medline TECHNOLOGIST PROVIDED HISTORY: Reason for exam:->picc line/ medline SEDATION: None FLUOROSCOPY DOSE AND TYPE OR TIME AND EXPOSURES: Fluoroscopy time equals 8.2 minutes. Total dose equals 116 mGy TECHNIQUE: The procedure was performed under ultrasound and fluoroscopic guidance. And ultrasound image and fluoroscopic image was obtained for medical records. Lorean Snare FINDINGS: The patient's right arm was prepped and draped in a sterile fashion using a maximum sterile barrier technique. Following the uneventful administration of lidocaine I introduced a micro puncture needle into the right basilic vein using ultrasound guidance. Through the micropuncture needle a microwire was introduced into the vein. Over the microwire a peel-away sheath was placed within the vein. A catheter measurement was obtained. The catheter was then trimmed to 25 cm and introduced over the wire into the right basilic vein. The catheter tip was placed within the right subclavian vein. .  The peel-away sheath was then removed and the PICC line was secured to the skin. A sterile dressing was applied. The patient tolerated the procedure well and no complications. A fluoroscopic image was obtained which confirms position of the PICC line catheter. Successful placement of a dual lumen power PICC line. The tip of the PICC line catheter was placed within the distal right subclavian vein. Lorean Snare  IMPRESSION: Successful ultrasound and fluoroscopy guided PICC placement     Assessment//Plan           Hospital Problems           Last Modified POA    * (Principal) Acute respiratory failure with hypoxia ([facility] [place] [address] 3/9/2022 Yes    Kidney transplanted 3/10/2022 Yes    Elevated troponin 3/10/2022 Yes    Paroxysmal atrial fibrillation ([address] 3/10/2022 Yes    Hypotension 3/10/2022 Yes    LBBB (left bundle branch block) 3/10/2022 Yes    History of DVT (deep vein thrombosis) 3/10/2022 Yes    Chronic anticoagulation 3/10/2022 Yes    Acute respiratory failure with hypoxemia ([address] 3/11/2022 Yes        Assessment:    Condition: In stable condition. Improving. (Sepsis ? Cause aspiration pneumonia  Was on zosyn and vanc , now on  levaquin ,   Acute respiratory failure secondary to above on o2 ,   Metabolic encephalopathy in immuno compromised host , on acyclovir for possible  herpes simplex  Encephalitis , mental status significantly improved   Metabolic acidosis , due to acute on chronic renal failure , creatinine better 1.5  cdiff colitis on  deficid  Left femoral and popliteal DVT on eliquis . S/p IVC filter placement   Acute on chronic renal failure , on  iv fluids  Creatinine better 1.5  hyperlpid , continue lipitor   Bipolar disorder on pristiq  And clozaril  Chronic renal  failure s/p renal transplant 2015 . On cellcept and cyclosporine   Hypothyroidism on levothyroxine . Hypotension resolved ,on midodrin as needed   Delirium / metabolic encephalopathy secondary to above , unable to have oral intake , NG tube , tolerating  TF, nepro 40 cc/ hour , might try oral intake tomorrow  If she is awake and alert like today   Metabolic acidosis due to renal failure   On hco3   Malnutrition and edema , spa and lasix x3   Anemia of chronic disease transfuse 1 unit of PRBCS . Hb better 9.1    discussed with  Angela Schroeder  On the phone  . Significant  improvement in the clinical  And mental status      ).        Electronically signed by Georgina Mosqueda MD on 3/11/22 at 8:21 PM EST

## 2022-03-19 LAB
ALBUMIN SERPL-MCNC: 3 G/DL (ref 3.5–5.2)
ALP BLD-CCNC: 44 U/L (ref 35–104)
ALT SERPL-CCNC: 17 U/L (ref 0–32)
ANION GAP SERPL CALCULATED.3IONS-SCNC: 8 MMOL/L (ref 7–16)
ANISOCYTOSIS: ABNORMAL
AST SERPL-CCNC: 20 U/L (ref 0–31)
BASOPHILS ABSOLUTE: 0 E9/L (ref 0–0.2)
BASOPHILS RELATIVE PERCENT: 0 % (ref 0–2)
BILIRUB SERPL-MCNC: 0.4 MG/DL (ref 0–1.2)
BUN BLDV-MCNC: 28 MG/DL (ref 6–23)
CALCIUM SERPL-MCNC: 10.2 MG/DL (ref 8.6–10.2)
CHLORIDE BLD-SCNC: 108 MMOL/L (ref 98–107)
CO2: 28 MMOL/L (ref 22–29)
CREAT SERPL-MCNC: 1.2 MG/DL (ref 0.5–1)
EOSINOPHILS ABSOLUTE: 0.56 E9/L (ref 0.05–0.5)
EOSINOPHILS RELATIVE PERCENT: 6 % (ref 0–6)
GFR AFRICAN AMERICAN: 54
GFR NON-AFRICAN AMERICAN: 45 ML/MIN/1.73
GLUCOSE BLD-MCNC: 111 MG/DL (ref 74–99)
HCT VFR BLD CALC: 33.9 % (ref 34–48)
HEMOGLOBIN: 10.3 G/DL (ref 11.5–15.5)
LYMPHOCYTES ABSOLUTE: 1.03 E9/L (ref 1.5–4)
LYMPHOCYTES RELATIVE PERCENT: 11 % (ref 20–42)
MCH RBC QN AUTO: 31.1 PG (ref 26–35)
MCHC RBC AUTO-ENTMCNC: 30.4 % (ref 32–34.5)
MCV RBC AUTO: 102.4 FL (ref 80–99.9)
METAMYELOCYTES RELATIVE PERCENT: 5 % (ref 0–1)
MONOCYTES ABSOLUTE: 0.85 E9/L (ref 0.1–0.95)
MONOCYTES RELATIVE PERCENT: 9 % (ref 2–12)
MYELOCYTE PERCENT: 4 % (ref 0–0)
NEUTROPHILS ABSOLUTE: 6.96 E9/L (ref 1.8–7.3)
NEUTROPHILS RELATIVE PERCENT: 65 % (ref 43–80)
PDW BLD-RTO: 18.3 FL (ref 11.5–15)
PLATELET # BLD: 188 E9/L (ref 130–450)
PMV BLD AUTO: 12.3 FL (ref 7–12)
POTASSIUM SERPL-SCNC: 3.5 MMOL/L (ref 3.5–5)
RBC # BLD: 3.31 E12/L (ref 3.5–5.5)
SODIUM BLD-SCNC: 144 MMOL/L (ref 132–146)
TOTAL PROTEIN: 4.4 G/DL (ref 6.4–8.3)
WBC # BLD: 9.4 E9/L (ref 4.5–11.5)

## 2022-03-19 PROCEDURE — 2700000000 HC OXYGEN THERAPY PER DAY

## 2022-03-19 PROCEDURE — 6360000002 HC RX W HCPCS: Performed by: SPECIALIST

## 2022-03-19 PROCEDURE — 6360000002 HC RX W HCPCS: Performed by: INTERNAL MEDICINE

## 2022-03-19 PROCEDURE — 2580000003 HC RX 258: Performed by: INTERNAL MEDICINE

## 2022-03-19 PROCEDURE — 1200000000 HC SEMI PRIVATE

## 2022-03-19 PROCEDURE — 6370000000 HC RX 637 (ALT 250 FOR IP): Performed by: INTERNAL MEDICINE

## 2022-03-19 PROCEDURE — 94640 AIRWAY INHALATION TREATMENT: CPT

## 2022-03-19 PROCEDURE — 85025 COMPLETE CBC W/AUTO DIFF WBC: CPT

## 2022-03-19 PROCEDURE — 80053 COMPREHEN METABOLIC PANEL: CPT

## 2022-03-19 PROCEDURE — 6370000000 HC RX 637 (ALT 250 FOR IP): Performed by: SPECIALIST

## 2022-03-19 PROCEDURE — 36415 COLL VENOUS BLD VENIPUNCTURE: CPT

## 2022-03-19 PROCEDURE — 2580000003 HC RX 258: Performed by: SPECIALIST

## 2022-03-19 RX ADMIN — IPRATROPIUM BROMIDE AND ALBUTEROL SULFATE 1 AMPULE: .5; 2.5 SOLUTION RESPIRATORY (INHALATION) at 18:13

## 2022-03-19 RX ADMIN — CLOZAPINE 50 MG: 25 TABLET ORAL at 21:04

## 2022-03-19 RX ADMIN — CYCLOSPORINE 100 MG: 100 SOLUTION ORAL at 21:05

## 2022-03-19 RX ADMIN — ALLOPURINOL 100 MG: 100 TABLET ORAL at 09:46

## 2022-03-19 RX ADMIN — VENLAFAXINE 37.5 MG: 37.5 TABLET ORAL at 09:46

## 2022-03-19 RX ADMIN — VENLAFAXINE 37.5 MG: 37.5 TABLET ORAL at 17:46

## 2022-03-19 RX ADMIN — HEPARIN 100 UNITS: 100 SYRINGE at 21:03

## 2022-03-19 RX ADMIN — IPRATROPIUM BROMIDE AND ALBUTEROL SULFATE 1 AMPULE: .5; 2.5 SOLUTION RESPIRATORY (INHALATION) at 13:29

## 2022-03-19 RX ADMIN — CYCLOSPORINE 100 MG: 100 SOLUTION ORAL at 09:43

## 2022-03-19 RX ADMIN — ACYCLOVIR SODIUM 350 MG: 1000 INJECTION, SOLUTION INTRAVENOUS at 11:48

## 2022-03-19 RX ADMIN — IPRATROPIUM BROMIDE AND ALBUTEROL SULFATE 1 AMPULE: .5; 2.5 SOLUTION RESPIRATORY (INHALATION) at 01:53

## 2022-03-19 RX ADMIN — DOCUSATE SODIUM 100 MG: 50 LIQUID ORAL at 21:05

## 2022-03-19 RX ADMIN — FIDAXOMICIN 200 MG: 200 TABLET, FILM COATED ORAL at 09:46

## 2022-03-19 RX ADMIN — PREDNISONE 5 MG: 5 TABLET ORAL at 09:46

## 2022-03-19 RX ADMIN — LEVOFLOXACIN 750 MG: 5 INJECTION, SOLUTION INTRAVENOUS at 16:46

## 2022-03-19 RX ADMIN — ACYCLOVIR SODIUM 350 MG: 1000 INJECTION, SOLUTION INTRAVENOUS at 20:54

## 2022-03-19 RX ADMIN — Medication 10 ML: at 10:05

## 2022-03-19 RX ADMIN — Medication 10 ML: at 21:14

## 2022-03-19 RX ADMIN — IPRATROPIUM BROMIDE AND ALBUTEROL SULFATE 1 AMPULE: .5; 2.5 SOLUTION RESPIRATORY (INHALATION) at 09:10

## 2022-03-19 RX ADMIN — FIDAXOMICIN 200 MG: 200 TABLET, FILM COATED ORAL at 21:04

## 2022-03-19 RX ADMIN — IPRATROPIUM BROMIDE AND ALBUTEROL SULFATE 1 AMPULE: .5; 2.5 SOLUTION RESPIRATORY (INHALATION) at 22:02

## 2022-03-19 RX ADMIN — APIXABAN 5 MG: 5 TABLET, FILM COATED ORAL at 09:46

## 2022-03-19 RX ADMIN — LEVOTHYROXINE SODIUM 50 MCG: 0.05 TABLET ORAL at 05:20

## 2022-03-19 RX ADMIN — IPRATROPIUM BROMIDE AND ALBUTEROL SULFATE 1 AMPULE: .5; 2.5 SOLUTION RESPIRATORY (INHALATION) at 06:02

## 2022-03-19 RX ADMIN — MAGNESIUM OXIDE 400 MG: 400 TABLET ORAL at 09:46

## 2022-03-19 RX ADMIN — MYCOPHENOLATE MOFETIL 500 MG: 200 POWDER, FOR SUSPENSION ORAL at 21:08

## 2022-03-19 RX ADMIN — HEPARIN 100 UNITS: 100 SYRINGE at 10:05

## 2022-03-19 RX ADMIN — MYCOPHENOLATE MOFETIL 500 MG: 200 POWDER, FOR SUSPENSION ORAL at 09:44

## 2022-03-19 RX ADMIN — DOCUSATE SODIUM 100 MG: 50 LIQUID ORAL at 09:46

## 2022-03-19 RX ADMIN — SODIUM BICARBONATE 650 MG: 648 TABLET ORAL at 09:46

## 2022-03-19 RX ADMIN — FOLIC ACID 1 MG: 1 TABLET ORAL at 09:46

## 2022-03-19 RX ADMIN — ATORVASTATIN CALCIUM 10 MG: 10 TABLET, FILM COATED ORAL at 21:04

## 2022-03-19 RX ADMIN — APIXABAN 5 MG: 5 TABLET, FILM COATED ORAL at 21:04

## 2022-03-19 RX ADMIN — PANTOPRAZOLE SODIUM 40 MG: 40 TABLET, DELAYED RELEASE ORAL at 05:20

## 2022-03-19 ASSESSMENT — PAIN SCALES - GENERAL: PAINLEVEL_OUTOF10: 0

## 2022-03-19 ASSESSMENT — PAIN SCALES - WONG BAKER: WONGBAKER_NUMERICALRESPONSE: 0

## 2022-03-19 NOTE — PROGRESS NOTES
[facility] MD Antonia Velarde MD Lorrain Paras, MD Curt Stains MD  Progress Note    Patient's Name: Akil Conte  1:02 PM  3/19/2022    Subjective  3/13 : seen in her room  Weak decondioned lying flat in bed , poor po intake , clinically euvolemic   3/14: NG tube not functioning, just replaced. No meds all day as a result. IV fluids been stopped for few hours, as well. 3/15 : seen today , o2/nc . Appear comfortable . TF going at goal . Per RN pt is more alert and awake   UE edema B/L 2+ .   3/17 : clinically better wake , talktive ,edema in UE better on TF     3/18 : on RA , alert awake ,oriented . No LE edema . Appear comfortable   3/19: Patient doing rather well, off of tube feeding patient started getting puréed food discussed the case with the son was at the bedside overall condition rather optimistic improving. History of Present Ilness:      Patient has h/o preserved left ventricular systolic function (CLEMENCIA  0/61/9271), stress test with no significant reversibility 6/16/2015, paroxysmal atrial fibrillation (on Eliquis), chronic left bundle branch block, HTN, HLD,  s/p kidney transplant,CKD, H/o DVT, s/p IVC on AC now, PVD, s/p thyroidectomy for cancer, gout, obesity     She was recently treated in hospitalfrom 2/25/2020 to 3/7/2022 because of acute hypoxic respiratory failure likely due to aspiration pneumonia with positive blood culture and acute metabolic encephalopathy     Patient was brought from [address] [place] [address] and rehab facility to ED of [facility] on 3/9/22 due to new mental status changes. Nephrology asked to see for CECILY /CKD   Pt has hx of cadaveric kidney transplant 7 years back   Her baseline cr has been 1.2-1.6 with a lot of fluctuation in contest of dehydration . Pt seen in her room , she is very weak and deconditoned to my eyes she has lost a lot of weight .    She has a hassna in place   She has poor po intake   She is euvolemic to mildly hypovolemic       Allergies:  Macrodantin [nitrofurantoin macrocrystal] and Sulfa antibiotics    Current Medications:    acyclovir (ZOVIRAX) 350 mg in dextrose 5 % 50 mL IVPB, Q8H  sodium bicarbonate tablet 650 mg, Daily  cycloSPORINE (NEORAL) 100 MG/ML microemulsion solution 100 mg, BID  0.9 % sodium chloride infusion, PRN  venlafaxine (EFFEXOR) tablet 37.5 mg, BID WC  epoetin casey-epbx (RETACRIT) injection 2,000 Units, Q7 Days   And  epoetin casey-epbx (RETACRIT) injection 3,000 Units, Q7 Days  mycophenolate (CELLCEPT) 200 MG/ML suspension 500 mg, BID  magnesium oxide (MAG-OX) tablet 400 mg, Daily  potassium chloride (KLOR-CON M) extended release tablet 20 mEq, Once  docusate (COLACE) 50 MG/5ML liquid 100 mg, BID  ipratropium-albuterol (DUONEB) nebulizer solution 1 ampule, Q4H  midodrine (PROAMATINE) tablet 5 mg, BID PRN  lidocaine 1 % injection 5 mL, Once  sodium chloride flush 0.9 % injection 5-40 mL, 2 times per day  sodium chloride flush 0.9 % injection 5-40 mL, PRN  0.9 % sodium chloride infusion, PRN  heparin flush 100 UNIT/ML injection 100 Units, 2 times per day  heparin flush 100 UNIT/ML injection 100 Units, PRN  [Held by provider] metoprolol tartrate (LOPRESSOR) tablet 12.5 mg, BID  Fidaxomicin (DIFICID) tablet 200 mg, BID  levoFLOXacin (LEVAQUIN) 750 MG/150ML infusion 750 mg, Q48H  apixaban (ELIQUIS) tablet 5 mg, BID  ondansetron (ZOFRAN-ODT) disintegrating tablet 4 mg, Q8H PRN  atorvastatin (LIPITOR) tablet 10 mg, Nightly  cloZAPine (CLOZARIL) tablet 50 mg, Nightly  levothyroxine (SYNTHROID) tablet 50 mcg, Daily  pantoprazole (PROTONIX) tablet 40 mg, QAM AC  predniSONE (DELTASONE) tablet 5 mg, Daily  allopurinol (ZYLOPRIM) tablet 092 mg, Daily  folic acid (FOLVITE) tablet 1 mg, Daily        Review of Systems:   Constitutional: weakness   Eyes: no eye pain , no itching , no drainage  Ears, nose, mouth, throat, and face: no ear ,nose pain , hearing is ok ,no nasal drainage   Respiratory: no sob ,no cough ,no wheezing . Cardiovascular: no chest pain , no palpitation ,no sob . Gastrointestinal: no nausea, vomiting , constipation , no abdominal pain . Genitourinary:no urinary retention , no burning , dysuria . No polyuria   Hematologic/lymphatic: no bleeding , no cougulation issues . Musculoskeletal:no joint pain , no swelling . Neurological: no headaches ,no weakness , no numbness . Endocrine: no thirst , no weight issues . Physical exam:   Vital signs BP (!) 120/56   Pulse 95   Temp 98.1 °F (36.7 °C) (Oral)   Resp 16   Ht 5' 2\" (1.575 m)   Wt 226 lb (102.5 kg)   SpO2 100%   BMI 41.34 kg/m²   Gen : In NAD , appears stated age . Head : NC/AT, EOMI, sclera and conjunctive are clear and anicteric. Mucous membranes dry  Neck : supple , no thyromegaly noted . Trachea midline  CV : regular rhythm, normal S1 and S2, No M/R/G . Lungs: CTAB , no wheezing , good air entry in all fields, though poor inspiratory effort  Abd : soft , NT , BS +   Skin : soft, dry . Neuro : CN  II-XII grossly intact , no focal neurologic deficit .    Psych : affect flat, minimally responsive, mostly to tactile stimulation, does not answer questions or follow commands    Data:   Labs:  CBC with Differential:    Lab Results   Component Value Date    WBC 9.4 03/19/2022    RBC 3.31 03/19/2022    HGB 10.3 03/19/2022    HCT 33.9 03/19/2022     03/19/2022    .4 03/19/2022    MCH 31.1 03/19/2022    MCHC 30.4 03/19/2022    RDW 18.3 03/19/2022    NRBC 1.0 03/18/2022    METASPCT 5.0 03/19/2022    LYMPHOPCT 11.0 03/19/2022    MONOPCT 9.0 03/19/2022    MYELOPCT 4.0 03/19/2022    BASOPCT 0.0 03/19/2022    MONOSABS 0.85 03/19/2022    LYMPHSABS 1.03 03/19/2022    EOSABS 0.56 03/19/2022    BASOSABS 0.00 03/19/2022     CMP:    Lab Results   Component Value Date     03/19/2022    K 3.5 03/19/2022    K 4.7 03/09/2022     03/19/2022    CO2 28 03/19/2022    BUN 28 03/19/2022    CREATININE 1.2 03/19/2022 GFRAA 54 03/19/2022    LABGLOM 45 03/19/2022    GLUCOSE 111 03/19/2022    PROT 4.4 03/19/2022    LABALBU 3.0 03/19/2022    CALCIUM 10.2 03/19/2022    BILITOT 0.4 03/19/2022    ALKPHOS 44 03/19/2022    AST 20 03/19/2022    ALT 17 03/19/2022     Ionized Calcium:  No results found for: IONCA  Magnesium:    Lab Results   Component Value Date    MG 1.7 03/16/2022     Phosphorus:    Lab Results   Component Value Date    PHOS 3.0 03/16/2022     U/A:    Lab Results   Component Value Date    COLORU Yellow 03/09/2022    PHUR 5.0 03/09/2022    WBCUA 1-3 03/09/2022    RBCUA NONE 03/09/2022    RBCUA 10-20 03/12/2014    YEAST Present 04/09/2021    BACTERIA MODERATE 03/09/2022    CLARITYU Clear 03/09/2022    SPECGRAV 1.025 03/09/2022    LEUKOCYTESUR Negative 03/09/2022    UROBILINOGEN 0.2 03/09/2022    BILIRUBINUR Negative 03/09/2022    BLOODU Negative 03/09/2022    GLUCOSEU Negative 03/09/2022     Microalbumen/Creatinine ratio:  No components found for: RUCREAT  Iron Saturation:  No components found for: PERCENTFE  TIBC:    Lab Results   Component Value Date    TIBC 92 03/17/2022     FERRITIN:    Lab Results   Component Value Date    FERRITIN 745 03/17/2022        Imaging:  XR CHEST ABDOMEN NG PLACEMENT   Final Result   The enteric tube terminates in the stomach. XR CHEST PORTABLE   Final Result   Increased opacities in left lung base related to increasing subsegmental   atelectasis or pleural effusion. XR ABDOMEN FOR NG/OG/NE TUBE PLACEMENT   Final Result   Satisfactory position of the enteric tube. XR CHEST PORTABLE   Final Result   Addendum 1 of 1   ADDENDUM:   The floor reports slow nasogastric tube output. Based on positioning on   plain film, recommend further advancement. Final   1. Possible right pleural effusion and right basilar atelectasis. 2. Cardiomegaly without pulmonary venous hypertension. 3. Possible hiatal hernia.             XR ABDOMEN FOR NG/OG/NE TUBE PLACEMENT   Final Result   Enteric tube tip in the fundus of the stomach. US RETROPERITONEAL COMPLETE   Final Result   No hydronephrosis of the transplant kidney. The bladder is decompressed by a Chin catheter. IR FLUORO GUIDED CVA DEVICE PLMT/REPLACE/REMOVAL   Final Result   Successful placement of a dual lumen power PICC line. The tip of the PICC   line catheter was placed within the distal right subclavian vein. Yandel Perla IMPRESSION:   Successful ultrasound and fluoroscopy guided PICC placement         IR ULTRASOUND GUIDANCE VASCULAR ACCESS   Final Result   Successful placement of a dual lumen power PICC line. The tip of the PICC   line catheter was placed within the distal right subclavian vein. Yandel Perla IMPRESSION:   Successful ultrasound and fluoroscopy guided PICC placement         CT HEAD WO CONTRAST   Final Result   No acute intracranial abnormality or hemorrhage. Bilateral mastoiditis and right sphenoid sinusitis. XR SHOULDER LEFT (MIN 2 VIEWS)   Final Result   No fracture. Increased separation of the humeral head from the osseous   glenoid and AC joint, which could represent subluxation. Otherwise, a large   synovial effusion, hematoma, or mass cannot be excluded. Clinical   correlation is recommended. CT scan of the left shoulder can be performed   for further evaluation. Cardiomegaly. XR HUMERUS LEFT (MIN 2 VIEWS)   Final Result   No fracture or dislocation. Increased separation of the humeral head from   the osseous glenoid and AC joint, which could represent subluxation. A large   synovial effusion, hematoma, or mass cannot be excluded. CT scan of the left   shoulder can be considered for further evaluation. XR CHEST PORTABLE   Final Result   1. Right perihilar discoid atelectasis. 2. There are no findings of failure or pneumonia. Assessment    -Acute kidney injury : improving     -Hyperchrloremic acidosis :better .  Continue nahco3 tablets -Hx of  donor kidney transplant : continue current immunosuppressent     -Immunosuppression host :  continue current immunosuppressent     -Anaemia of chronic disease : some of the anaemia is dilutional . continue retacrit 5000 u sc q week     -Edema mainly in UE : better . With Na going up hold lasix today .      -Hypo Magensiemia : replace     -severe deconditioning : PT/OT         Electronically signed by Sahara Sagastume MD on 3/19/2022

## 2022-03-19 NOTE — PROGRESS NOTES
Progress Note  Date:3/19/2022       Room:0415/0415-01  Patient Cesar Hale     YOB: 1955     Age:66 y.o. Subjective    Subjective:  Symptoms:  Stable. (Still non arousable , moves her head and moans only ). Diet:  Dietary issues: on TF was on hold , will restart if not waking up. Activity level: Impaired due to weakness. Pain:  She reports no pain. Review of Systems  Objective         Vitals Last 24 Hours:  TEMPERATURE:  Temp  Av °F (36.7 °C)  Min: 97.9 °F (36.6 °C)  Max: 98.1 °F (36.7 °C)  RESPIRATIONS RANGE: Resp  Av.5  Min: 16  Max: 17  PULSE OXIMETRY RANGE: SpO2  Av.3 %  Min: 96 %  Max: 100 %  PULSE RANGE: Pulse  Av  Min: 93  Max: 95  BLOOD PRESSURE RANGE: Systolic (77SDC), KOP:497 , Min:120 , JFL:851   ; Diastolic (95OLR), WIO:95, Min:56, Max:77    I/O (24Hr): Intake/Output Summary (Last 24 hours) at 3/19/2022 1042  Last data filed at 3/18/2022 2157  Gross per 24 hour   Intake 354.38 ml   Output 900 ml   Net -545.62 ml     Objective:  General Appearance: In no acute distress and comfortable. Vital signs: (most recent): Blood pressure (!) 120/56, pulse 95, temperature 98.1 °F (36.7 °C), temperature source Oral, resp. rate 16, height 5' 2\" (1.575 m), weight 226 lb (102.5 kg), SpO2 100 %. Vital signs are normal.    Output: Producing urine. HEENT: Normal HEENT exam.    Lungs:  Normal effort and normal respiratory rate. There are decreased breath sounds. Heart: Normal rate. Regular rhythm. Positive for murmur. Abdomen: Abdomen is soft and distended. Bowel sounds are normal.   There is no abdominal tenderness. Extremities: (Edema +1   Both LL )  Pulses: Distal pulses are intact. Neurological: Patient is alert. ( Moves her head and moans on calling her ). Pupils:  Pupils are equal, round, and reactive to light.       Labs/Imaging/Diagnostics    Labs:  CBC:  Recent Labs     22  0615 22  0530 22  8843 WBC 7.5 8.7 9.4   RBC 2.95* 3.15* 3.31*   HGB 9.1* 9.8* 10.3*   HCT 30.4* 31.9* 33.9*   .1* 101.3* 102.4*   RDW 18.9* 18.7* 18.3*    192 188     CHEMISTRIES:  Recent Labs     03/17/22  0616 03/18/22  0530 03/19/22  0526    149* 144   K 3.2* 3.7 3.5   * 113* 108*   CO2 24 26 28   BUN 32* 30* 28*   CREATININE 1.5* 1.4* 1.2*   GLUCOSE 134* 171* 111*     PT/INR:No results for input(s): PROTIME, INR in the last 72 hours. APTT:No results for input(s): APTT in the last 72 hours. LIVER PROFILE:  Recent Labs     03/17/22 0616 03/18/22 0530 03/19/22  0526   AST 19 20 20   ALT 17 16 17   BILITOT 0.5 0.4 0.4   ALKPHOS 58 43 44       Imaging Last 24 Hours:  XR HUMERUS LEFT (MIN 2 VIEWS)    Result Date: 3/10/2022  EXAMINATION: TWO XRAY VIEWS OF THE LEFT HUMERUS 3/10/2022 9:31 am COMPARISON: None. HISTORY: ORDERING SYSTEM PROVIDED HISTORY: limited mobility TECHNOLOGIST PROVIDED HISTORY: Reason for exam:->limited mobility FINDINGS: There is no evidence of fracture or dislocation. No significant osteo-degenerative changes or other osseous abnormalities are identified. There is increased separation of the humeral head from the osseous glenoid and AC joint. This could represent subluxation. The presence of a large synovial effusion, hematoma, or mass cannot be excluded. CT scan of the left shoulder can be considered for further evaluation. Surgical clips are identified in the soft tissues about the mid to distal humerus. No fracture or dislocation. Increased separation of the humeral head from the osseous glenoid and AC joint, which could represent subluxation. A large synovial effusion, hematoma, or mass cannot be excluded. CT scan of the left shoulder can be considered for further evaluation.      CT HEAD WO CONTRAST    Result Date: 3/10/2022  EXAMINATION: CT OF THE HEAD WITHOUT CONTRAST  3/10/2022 3:29 pm TECHNIQUE: CT of the head was performed without the administration of intravenous contrast. Dose modulation, iterative reconstruction, and/or weight based adjustment of the mA/kV was utilized to reduce the radiation dose to as low as reasonably achievable. COMPARISON: February 25, 2022 HISTORY: ORDERING SYSTEM PROVIDED HISTORY: AMS TECHNOLOGIST PROVIDED HISTORY: Reason for exam:->AMS Has a \"code stroke\" or \"stroke alert\" been called? ->No FINDINGS: BRAIN/VENTRICLES: There are age related cortical atrophy and periventricular white matter ischemic changes. There is no acute intracranial hemorrhage, mass effect or midline shift. No abnormal extra-axial fluid collection. The gray-white differentiation is maintained without evidence of an acute infarct. There is no evidence of hydrocephalus. ORBITS: The visualized portion of the orbits demonstrate no acute abnormality. SINUSES: There is partial opacification of bilateral mastoid air cells. Mucosal thickening in the right sphenoid sinus. SOFT TISSUES/SKULL:  No acute abnormality of the visualized skull or soft tissues. No acute intracranial abnormality or hemorrhage. Bilateral mastoiditis and right sphenoid sinusitis. IR FLUORO GUIDED CVA DEVICE PLMT/REPLACE/REMOVAL    Result Date: 3/11/2022  PROCEDURE: ULTRASOUND GUIDED VASCULAR ACCESS. FLUOROSCOPY GUIDED PICC PLACEMENT 3/11/2022. HISTORY: ORDERING SYSTEM PROVIDED HISTORY: picc line/ medline TECHNOLOGIST PROVIDED HISTORY: Reason for exam:->picc line/ medline SEDATION: None FLUOROSCOPY DOSE AND TYPE OR TIME AND EXPOSURES: Fluoroscopy time equals 8.2 minutes. Total dose equals 116 mGy TECHNIQUE: The procedure was performed under ultrasound and fluoroscopic guidance. And ultrasound image and fluoroscopic image was obtained for medical records. Dora Veloz FINDINGS: The patient's right arm was prepped and draped in a sterile fashion using a maximum sterile barrier technique.  Following the uneventful administration of lidocaine I introduced a micro puncture needle into the right basilic vein using ultrasound guidance. Through the micropuncture needle a microwire was introduced into the vein. Over the microwire a peel-away sheath was placed within the vein. A catheter measurement was obtained. The catheter was then trimmed to 25 cm and introduced over the wire into the right basilic vein. The catheter tip was placed within the right subclavian vein. .  The peel-away sheath was then removed and the PICC line was secured to the skin. A sterile dressing was applied. The patient tolerated the procedure well and no complications. A fluoroscopic image was obtained which confirms position of the PICC line catheter. Successful placement of a dual lumen power PICC line. The tip of the PICC line catheter was placed within the distal right subclavian vein. Luis Mustafa IMPRESSION: Successful ultrasound and fluoroscopy guided PICC placement     XR SHOULDER LEFT (MIN 2 VIEWS)    Result Date: 3/10/2022  EXAMINATION: THREE XRAY VIEWS OF THE LEFT SHOULDER 3/10/2022 9:31 am COMPARISON: None HISTORY: ORDERING SYSTEM PROVIDED HISTORY: limited mobility and bruising TECHNOLOGIST PROVIDED HISTORY: Reason for exam:->limited mobility and bruising FINDINGS: There is no evidence of fracture. There is increased separation of the humeral head from the osseous glenoid and the acromioclavicular joint, which could represent subluxation. Otherwise, the presence of a large synovial effusion, hematoma, or mass cannot be excluded. Clinical correlation is recommended. CT scan of the left shoulder can be performed for further evaluation. The heart is enlarged. Surgical clips are identified in the soft tissues surrounding the midportion of the humerus. No fracture. Increased separation of the humeral head from the osseous glenoid and AC joint, which could represent subluxation. Otherwise, a large synovial effusion, hematoma, or mass cannot be excluded. Clinical correlation is recommended.   CT scan of the left shoulder can be performed for further evaluation. Cardiomegaly. IR ULTRASOUND GUIDANCE VASCULAR ACCESS    Result Date: 3/11/2022  PROCEDURE: ULTRASOUND GUIDED VASCULAR ACCESS. FLUOROSCOPY GUIDED PICC PLACEMENT 3/11/2022. HISTORY: ORDERING SYSTEM PROVIDED HISTORY: picc line/ medline TECHNOLOGIST PROVIDED HISTORY: Reason for exam:->picc line/ medline SEDATION: None FLUOROSCOPY DOSE AND TYPE OR TIME AND EXPOSURES: Fluoroscopy time equals 8.2 minutes. Total dose equals 116 mGy TECHNIQUE: The procedure was performed under ultrasound and fluoroscopic guidance. And ultrasound image and fluoroscopic image was obtained for medical records. Myah Huff FINDINGS: The patient's right arm was prepped and draped in a sterile fashion using a maximum sterile barrier technique. Following the uneventful administration of lidocaine I introduced a micro puncture needle into the right basilic vein using ultrasound guidance. Through the micropuncture needle a microwire was introduced into the vein. Over the microwire a peel-away sheath was placed within the vein. A catheter measurement was obtained. The catheter was then trimmed to 25 cm and introduced over the wire into the right basilic vein. The catheter tip was placed within the right subclavian vein. .  The peel-away sheath was then removed and the PICC line was secured to the skin. A sterile dressing was applied. The patient tolerated the procedure well and no complications. A fluoroscopic image was obtained which confirms position of the PICC line catheter. Successful placement of a dual lumen power PICC line. The tip of the PICC line catheter was placed within the distal right subclavian vein. Myah Huff  IMPRESSION: Successful ultrasound and fluoroscopy guided PICC placement     Assessment//Plan           Hospital Problems           Last Modified POA    * (Principal) Acute respiratory failure with hypoxia ([address] 3/9/2022 Yes    Kidney transplanted 3/10/2022 Yes    Elevated troponin 3/10/2022 Yes    Paroxysmal atrial fibrillation ([facility] 3/10/2022 Yes    Hypotension 3/10/2022 Yes    LBBB (left bundle branch block) 3/10/2022 Yes    History of DVT (deep vein thrombosis) 3/10/2022 Yes    Chronic anticoagulation 3/10/2022 Yes    Acute respiratory failure with hypoxemia ([facility] [address] 3/11/2022 Yes        Assessment:    Condition: In stable condition. Improving. (Sepsis ? Cause aspiration pneumonia  Was on zosyn and vanc , now on  levaquin ,   Acute respiratory failure secondary to above on o2 ,better  ,   Metabolic encephalopathy in immuno compromised host , on acyclovir for possible  herpes simplex  Encephalitis , mental status  Improved  But back again to not responding and decrease mental status   Metabolic acidosis , due to acute on chronic renal failure , creatinine better 1.2  cdiff colitis on  deficid  Left femoral and popliteal DVT on eliquis . S/p IVC filter placement   Acute on chronic renal failure , on  iv fluids  Creatinine better 1.2  hyperlpid , continue lipitor   Bipolar disorder on pristiq  And clozaril  Chronic renal  failure s/p renal transplant 2015 . On cellcept and cyclosporine   Hypothyroidism on levothyroxine . Hypotension resolved ,on midodrin as needed   Delirium / metabolic encephalopathy secondary to above , unable to have oral intake , NG tube , tolerating  TF, nepro 40 cc/ hour , speech recommended, soft diet and thin liquids , hold tube feed and see if patient will be awake enough to continue oral intake on her own if not will resume tube feed  Metabolic acidosis due to renal failure   On hco3   Malnutrition and edema , spa and lasix x3   Anemia of chronic disease transfuse 1 unit of PRBCS . Hb better   Hypernatremia, on free water intake through the NG tube,  Better / resolved     ).        Electronically signed by Ray Chacon MD on 3/11/22 at 8:21 PM EST

## 2022-03-19 NOTE — PLAN OF CARE
Problem: Skin Integrity:  Goal: Will show no infection signs and symptoms  Description: Will show no infection signs and symptoms  Outcome: Met This Shift  Goal: Absence of new skin breakdown  Description: Absence of new skin breakdown  Outcome: Met This Shift     Problem: Mental Status - Impaired:  Goal: Mental status will improve  Description: Mental status will improve  Outcome: Met This Shift

## 2022-03-19 NOTE — PROGRESS NOTES
Dr. Kristy Clark,    This patient's renal function has improved. Acyclovir dose adjusted back to the original order of:    Acyclovir 350 mg q8h. Serum creatinine: 1.2 mg/dL (H) 03/19/22 0526. Estimated creatinine clearance: 52 mL/min    These changes were made per protocol according to the [facility] Renal Function-Based Dose Adjustments Policy    Please contact pharmacy with any questions regarding these changes.     Concepcion Oakley, [facility]  3/19/2022  10:07 AM

## 2022-03-19 NOTE — PROGRESS NOTES
[facility]     [address] [place] [address]  [place], [address]  Phone [phone]   [phone]      Admit Date: 3/9/2022 10:02 AM  Pt Name: Azalea Voss  MRN: [id number]  : 1955  Reason for Consult:    Chief Complaint   Patient presents with    Altered Mental Status     from NH     Requesting Physician:  Geeta Grant MD  PCP: Nestor Echeverria MD  History Obtained From:  patient, chart   ID consulted for Confusion [R41.0]  Acute respiratory failure with hypoxia ([address] [J96.01]  Acute respiratory failure with hypoxemia ([address] [J96.01]  on hospital day 8   Face to face encounter   [facility]       Chief Complaint   Patient presents with    Altered Mental Status     from NH     HISTORYOF PRESENT ILLNESS   Azalea Voss is a 73 y.o. female who presents with   has a past medical history of Abnormal EKG, Acute gout involving toe of right foot, Acute on chronic combined systolic (congestive) and diastolic (congestive) heart failure ([address] Cancer ([address] Cerebrovascular disease, Clotting disorder ([address] Depression, Hemodialysis patient ([address] History of blood transfusion, Hyperlipidemia, Hypertension, Hypothyroidism, Leg swelling, Lymphedema of both lower extremities, Peripheral vascular disease ([address] Renal failure, Thrombophlebitis, and Thyroid disease. Altered Mental Status      Know to ID   · Last seen on 3/2/2022-d/c on Augmentin cover poss aspiration and colitis  Pt came in due to altered mental status. Wbc24.7 hgb10.8 nto864 cr1.6 TMAX99.3  PT WAS SEEN AND EXAMINED WITH NURSES PRESENT  PT IS NOT AROUSABLE UNABLE TO OBTAIN HISTORY    PT HAS SOFT STOOLS SOME ODOR  SACRAL AREA VIEWED HAS SMALL DTI AREA  CXRY 1. Right perihilar discoid atelectasis. 2. There are no findings of failure or pneumonia.    LEFT SHOULDER No fracture.  Increased separation of the humeral head from the osseous   glenoid and AC joint, which could represent subluxation.  Otherwise, a large   synovial effusion, hematoma, or mass cannot be excluded.  Clinical   correlation is recommended.  CT scan of the left shoulder can be performed   for further evaluation.  Cardiomegaly. LEFT HUMERUS No fracture or dislocation.  Increased separation of the humeral head from   the osseous glenoid and AC joint, which could represent subluxation.  A large   synovial effusion, hematoma, or mass cannot be excluded.  CT scan of the left   shoulder can be considered for further evaluation. piperacillin-tazobactam (ZOSYN) 3,375 mg in dextrose 5 % 50 mL IVPB extended infusion (mini-bag), Q8H  vancomycin (VANCOCIN) oral solution 125 mg, 4 times per day  cycloSPORINE (SANDIMMUNE) capsule 100 mg, BID  mycophenolate (CELLCEPT) capsule 500 mg, BID  metoprolol succinate (TOPROL XL) extended release tablet 50 mg, Daily  predniSONE (DELTASONE) tablet 5 mg, Daily     LAST BLOOD CX 2/25 cons  2/28 CLEMENCIA  Summary   Technically difficult study. No definate evidence of vegetation consistent with endocarditis. Normal LV segmental wall motion. Ejection fraction is visually estimated at 60 to 65%. Normal right ventricular size and function. DOS  03/19/22   AFEBRILE  WBC9.4 CR1.2   IN BED NAD  MOR AWAKE ALERT  COUSIN/DAUGHTER PRESENT   FUNGITELL NEG     3/18/2022  Awake today   Still with NG tube  Conversing - has been afebrile. On 2 liters nasal canula.      3/17/2022  Much more awake today conversing working with pt    3/16/2022  Son present and updated questions answered  Pt in bed has ngt briefly arouses    3/15/2022  In bed responds a little has ngt   Nurse/aide present    3/14/2022  In bed more awake has ngt  afebrile 2L cr2.95 wbc4.7     3/13/2022  In bed briefly arouses   Does not stay awake   Spoke with nurse unable to give meds    3/12/2022  briefly arouses has no abd pain    3/11/2022  In bed open eyes briefly still lethargic  cdiff +  REVIEW OF SYSTEMS     CONSTITUTIONAL:   AS IN HPI Medications Prior to Admission: pantoprazole (PROTONIX) 40 MG tablet, Take 1 tablet by mouth every morning (before breakfast)  [] amoxicillin-clavulanate (AUGMENTIN) 875-125 MG per tablet, Take 1 tablet by mouth every 12 hours for 10 days  apixaban (ELIQUIS) 5 MG TABS tablet, Take 5 mg by mouth 2 times daily Take in the morning and at bedtime for 3 months (started 2022)  atorvastatin (LIPITOR) 10 MG tablet, Take 10 mg by mouth daily  lisinopril (PRINIVIL;ZESTRIL) 5 MG tablet, take 1 tablet by mouth once daily  pregabalin (LYRICA) 75 MG capsule,   nystatin (MYCOSTATIN) 629628 UNIT/GM powder, Apply 3 times daily. ipratropium-albuterol (DUONEB) 0.5-2.5 (3) MG/3ML SOLN nebulizer solution, Inhale 3 mLs into the lungs every 6 hours as needed for Shortness of Breath  ondansetron (ZOFRAN-ODT) 4 MG disintegrating tablet, Take 1 tablet by mouth every 8 hours as needed for Nausea or Vomiting  allopurinol (ZYLOPRIM) 100 MG tablet, Take 100 mg by mouth daily  Cyanocobalamin (B-12 COMPLIANCE INJECTION) 1000 MCG/ML KIT, Inject as directed monthly  metoprolol succinate (TOPROL XL) 50 MG extended release tablet, take 1 tablet by mouth once daily  desvenlafaxine succinate (PRISTIQ) 50 MG TB24 extended release tablet, Take 1 tablet by mouth every evening  cloZAPine (CLOZARIL) 50 MG tablet, Take 50 mg by mouth nightly  cycloSPORINE (SANDIMMUNE) 100 MG capsule, Take 100 mg by mouth 2 times daily  predniSONE (DELTASONE) 5 MG tablet, Take 5 mg by mouth daily  folic acid (FOLVITE) 1 MG tablet, Take 1 mg by mouth daily  docusate sodium (COLACE) 100 MG capsule, Take 100 mg by mouth 2 times daily  mycophenolate (CELLCEPT) 500 MG tablet, Take 1,000 mg by mouth 2 times daily  levothyroxine (SYNTHROID) 50 MCG tablet, Take 50 mcg by mouth daily.     budesonide (PULMICORT) 0.5 MG/2ML nebulizer suspension, Take 2 mLs by nebulization 2 times daily for 10 days  CURRENT MEDICATIONS     Current Facility-Administered Medications:    acyclovir (ZOVIRAX) 350 mg in dextrose 5 % 50 mL IVPB, 5 mg/kg (Adjusted), IntraVENous, Q8H, Brian Cruz MD, Stopped at 03/19/22 1324    sodium bicarbonate tablet 650 mg, 650 mg, Oral, Daily, Rudy Baldwin MD, 650 mg at 03/19/22 0946    cycloSPORINE (NEORAL) 100 MG/ML microemulsion solution 100 mg, 100 mg, Oral, BID, Jett Ramirez MD, 100 mg at 03/19/22 0943    0.9 % sodium chloride infusion, , IntraVENous, PRN, Nile Lara MD    [facility] tablet 37.5 mg, 37.5 mg, Oral, BID WC, Susannah Ramirez MD, 37.5 mg at 03/19/22 0946    epoetin casey-epbx (RETACRIT) injection 2,000 Units, 2,000 Units, SubCUTAneous, Q7 Days, 2,000 Units at 03/16/22 2316 **AND** epoetin casey-epbx (RETACRIT) injection 3,000 Units, 3,000 Units, SubCUTAneous, Q7 Days, Rudy Baldwin MD, 3,000 Units at 03/16/22 2314    mycophenolate (CELLCEPT) 200 MG/ML suspension 500 mg, 500 mg, Oral, BID, Nile Lara MD, 500 mg at 03/19/22 0944    magnesium oxide (MAG-OX) tablet 400 mg, 400 mg, Oral, Daily, Pita Crocker MD, 400 mg at 03/19/22 0946    potassium chloride (KLOR-CON M) extended release tablet 20 mEq, 20 mEq, Oral, Once, Nile Lara MD    docusate (COLACE) 50 MG/5ML liquid 100 mg, 100 mg, Per NG tube, BID, Nile Lara MD, 100 mg at 03/19/22 0946    ipratropium-albuterol (DUONEB) nebulizer solution 1 ampule, 1 ampule, Inhalation, Q4H, Caity Taylor MD, 1 ampule at 03/19/22 1329    midodrine (PROAMATINE) tablet 5 mg, 5 mg, Oral, BID PRN, Nile Lara MD    lidocaine 1 % injection 5 mL, 5 mL, IntraDERmal, Once, Nile Lara MD    sodium chloride flush 0.9 % injection 5-40 mL, 5-40 mL, IntraVENous, 2 times per day, Nile Lara MD, 10 mL at 03/19/22 1005    sodium chloride flush 0.9 % injection 5-40 mL, 5-40 mL, IntraVENous, PRN, Susannah Ramirez MD, 10 mL at 03/16/22 1812    0.9 % sodium chloride infusion, 25 mL, IntraVENous, PRN, Jett Ramirez MD    heparin flush 100 UNIT/ML injection 100 Units, 1 mL, IntraVENous, 2 times per day, Leonardo Ngo MD, 100 Units at 03/19/22 1005    heparin flush 100 UNIT/ML injection 100 Units, 1 mL, IntraCATHeter, PRN, Leonardo Ngo MD    [Held by provider] metoprolol tartrate (LOPRESSOR) tablet 12.5 mg, 12.5 mg, Oral, BID, Helio Garibay MD    Fidaxomicin (DIFICID) tablet 200 mg, 200 mg, Oral, BID, Fahad De La Rosa MD, 200 mg at 03/19/22 0946    levoFLOXacin (LEVAQUIN) 750 MG/150ML infusion 750 mg, 750 mg, IntraVENous, Q48H, Fahad De La Rosa MD, Stopped at 03/17/22 2042    apixaban (ELIQUIS) tablet 5 mg, 5 mg, Oral, BID, Susannah Ramirez MD, 5 mg at 03/19/22 0946    ondansetron (ZOFRAN-ODT) disintegrating tablet 4 mg, 4 mg, Oral, Q8H PRN, Cass Ramirez MD    atorvastatin (LIPITOR) tablet 10 mg, 10 mg, Oral, Nightly, Susannah Ramirez MD, 10 mg at 03/18/22 2013    cloZAPine (CLOZARIL) tablet 50 mg, 50 mg, Oral, Nightly, Susannah Ramirez MD, 50 mg at 03/18/22 2016    levothyroxine (SYNTHROID) tablet 50 mcg, 50 mcg, Oral, Daily, Cass Ramirez MD, 50 mcg at 03/19/22 0520    pantoprazole (PROTONIX) tablet 40 mg, 40 mg, Oral, QAM AC, Susannah Ramirez MD, 40 mg at 03/19/22 0520    predniSONE (DELTASONE) tablet 5 mg, 5 mg, Oral, Daily, Leonardo Ngo MD, 5 mg at 03/19/22 0946    allopurinol (ZYLOPRIM) tablet 100 mg, 100 mg, Oral, Daily, Susannah Ramirez MD, 100 mg at 12/24/[birth year] 9001    folic acid (FOLVITE) tablet 1 mg, 1 mg, Oral, Daily, Leonardo Ngo MD, 1 mg at 03/19/22 1645  ALLERGIES     Macrodantin [nitrofurantoin macrocrystal] and Sulfa antibiotics  Immunization History   Administered Date(s) Administered    COVID-19, Pfizer Purple top, DILUTE for use, 12+ yrs, 30mcg/0.3mL dose 01/28/2021, 02/18/2021, 08/18/2021      Internal Administration   First Dose COVID-19, Pfizer Purple top, DILUTE for use, 12+ yrs, 30mcg/0.3mL dose  01/28/2021   Second Dose COVID-19, Pfizer Purple top, DILUTE for use, 12+ yrs, 30mcg/0.3mL dose   02/18/2021       Last COVID Lab SARS-CoV-2 (no units)   Date Value   2021 Not Detected     SARS-CoV-2 Antibody, Total (no units)   Date Value   2022 Non-Reactive     SARS-CoV-2, PCR (no units)   Date Value   2022 Not Detected     SARS-CoV-2, NAAT (no units)   Date Value   2022 Not Detected        PAST MEDICAL HISTORY     Past Medical History:   Diagnosis Date    Abnormal EKG     left bundle branch block    Acute gout involving toe of right foot 2020    Acute on chronic combined systolic (congestive) and diastolic (congestive) heart failure (HCC)     Cancer (HCC)     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    Cerebrovascular disease     Clotting disorder ([address] 1985    thrombophlebitis after c section delivery    Depression     15 years followed by dr Kathrine Coleman Hemodialysis patient [facility])     History of blood transfusion     Hyperlipidemia     Hypertension     Hypothyroidism     Leg swelling 2020    Lymphedema of both lower extremities 2020    Peripheral vascular disease [facility]     Renal failure 2012    renal transplant    Thrombophlebitis     after c section delivery    Thyroid disease      SURGICAL HISTORY       Past Surgical History:   Procedure Laterality Date     SECTION  1985    one normal delivery    COLECTOMY N/A 1/15/2022    DIAGNOSTIC  LAPAROSCOPY LYSIS OF ADHESIONS performed by Tiffani Lewis MD at [address] [place] [address] Drive CATH LAB PROCEDURE      normal coronaries and  normal coronaries    DIALYSIS FISTULA CREATION  march and 2012    phase one and two patient will start dialysis when needed   [address]    transfemoral venous bypass grafts    IR IVC FILTER PLACEMENT W IMAGING  2022    IR IVC FILTER PLACEMENT W IMAGING 2022 [facility]ED[facility]    KIDNEY TRANSPLANT  2016    KIDNEY TRANSPLANT  2015    KIDNEY TRANSPLANT  2015    PARATHYROIDECTOMY  2006    left parathyroid  implant and parathyroidectomy    TRANSESOPHAGEAL ECHOCARDIOGRAM N/A 2/28/2022    TRANSESOPHAGEAL ECHOCARDIOGRAM WITH BUBBLE STUDY performed by Rico Moya MD at [facility]     ·  [address]:    03/18/22 0915 03/18/22 1959 03/19/22 0153 03/19/22 0745   BP: (!) 148/76 (!) 164/77  (!) 120/56   Pulse: 96 93  95   Resp: 18 17  16   Temp: 97.5 °F (36.4 °C) 97.9 °F (36.6 °C)  98.1 °F (36.7 °C)   TempSrc: Oral Oral  Oral   SpO2: 97% 99% 96% 100%   Weight:       Height:         Physical Exam   Constitutional/General:  In bed  Head: NC/AT  Pulmonary: . ON O2 , diminished to bases. Cardiovascular:  Regular rate and rhythm  Abdomen:   distension. NGT NT   Extremities:  Warm and well perfused dec rom   Skin: Warm and dry  No rash , ++ edema. Neurologic:     Awake and alert  MERCHANT   3/11 right arm- midline        DIAGNOSTIC RESULTS   RADIOLOGY:   ECHO Transesophageal    Result Date: 3/1/2022  Transesophageal Echocardiography Report (CLEMENCIA)   Demographics   Patient Name      Esequiel Dozier       Gender              Female                    [facility] Record    [id number]           Room Number         1360  Number   Account #         [de-identified]          Procedure Date      02/28/2022   Corporate ID                         Ordering Physician  Chantal Rae MD   Accession Number  [id number]         Referring Physician   Date of Birth     1955         Sonographer         Jack Bhatti                                                           [facility]   Age               73 year(s)         Interpreting        Chantal Rae MD                                       Physician                                        Any Other  Procedure Type of Study   CLEMENCIA procedure:Transesophageal Echo (CLEMENCIA), Color Flow Velocity Mapping. Procedure Date Date: 02/28/2022 Start: 11:00 AM Study Location: Portable Technical Quality: Adequate visualization Indications:R/O Endocarditis.  Patient Status: Routine Height: 62 inches Weight: 220 pounds BSA: 1.99 m^2 BMI: 40.24 kg/m^2 BP: 138/79 mmHg CLEMENCIA Performed By: the attending and the sonographer  Type of Anesthesia: Moderate sedation   Findings   Left Ventricle  Normal LV segmental wall motion. Ejection fraction is visually estimated at 60 to 65%. Right Ventricle  Normal right ventricular size and function. Left Atrium  No evidence of thrombus within left atrium. Right Atrium  Normal right atrium size. Agitated saline injected for shunt evaluation shows no shunt. Mitral Valve  No vegetation. Mild mitral regurgitation is present. Tricuspid Valve  No vegetation. Aortic Valve  No vegetation. The aortic valve appears mildly sclerotic. Pulmonic Valve  No vegetation. Conclusions   Summary  Technically difficult study. No definate evidence of vegetation consistent with endocarditis. Normal LV segmental wall motion. Ejection fraction is visually estimated at 60 to 65%. Normal right ventricular size and function. Signature   ----------------------------------------------------------------  Electronically signed by Santa Loya MD(Interpreting  physician) on 03/01/2022 09:11 AM  ----------------------------------------------------------------  [email or web address] [id number]    CT HEAD WO CONTRAST    Result Date: 2/25/2022  EXAMINATION: CT OF THE HEAD WITHOUT CONTRAST  2/25/2022 3:08 am TECHNIQUE: CT of the head was performed without the administration of intravenous contrast. Dose modulation, iterative reconstruction, and/or weight based adjustment of the mA/kV was utilized to reduce the radiation dose to as low as reasonably achievable. COMPARISON: Correlation with MRI dated 01/16/2022 HISTORY: ORDERING SYSTEM PROVIDED HISTORY: mental status change TECHNOLOGIST PROVIDED HISTORY: Has a \"code stroke\" or \"stroke alert\" been called? ->No Reason for exam:->mental status change Decision Support Exception - unselect if not a suspected or confirmed emergency medical condition->Emergency Medical Condition (MA) FINDINGS: BRAIN/VENTRICLES: There is no acute intracranial hemorrhage, mass effect or midline shift. No abnormal extra-axial fluid collection. The gray-white differentiation is maintained without evidence of an acute infarct. There is no evidence of hydrocephalus. Minimal cortical volume loss. Scattered vascular calcifications. ORBITS: The visualized portion of the orbits demonstrate no acute abnormality. SINUSES: Significant opacification of the left greater than right mastoid air cells similar to previous. Mild mucosal thickening in the left sphenoid sinus. This was also present previously. SOFT TISSUES/SKULL:  No acute abnormality of the visualized skull or soft tissues. No acute intracranial abnormality. MRI would be useful if symptoms persist. Inflammatory changes of left greater than right mastoid air cells and left sphenoid sinus similar to prior MRI. RECOMMENDATIONS: Unavailable     NM LUNG VENT/PERFUSION (VQ)    Result Date: 2/25/2022  EXAMINATION: NUCLEAR MEDICINE VENTILATION PERFUSION SCAN. 2/25/2022 TECHNIQUE: 3.5 millicuries aerosolized Tc99m DTPA was administered via mask prior to planar imaging of the lungs in multiple projections. Then, 4.2 millicuries of Tc 54S MAA was administered intravenously prior to planar imaging of the lungs in similar projections. COMPARISON: Chest CT February 25, 2022 . HISTORY: ORDERING SYSTEM PROVIDED HISTORY: R/O PE TECHNOLOGIST PROVIDED HISTORY: Reason for exam:->R/O PE What reading provider will be dictating this exam?->CRC FINDINGS: PERFUSION: Distribution of radiotracer is homogeneous. No segmental defects identified. VENTILATION: Ventilation images are unremarkable. CHEST CT: Small infiltrate or atelectasis posterior right lung. Negative for pulmonary embolus.      CT ABDOMEN PELVIS W IV CONTRAST Additional Contrast? None    Result Date: 2/25/2022  EXAMINATION: CT OF THE ABDOMEN AND PELVIS WITH CONTRAST 2/25/2022 3:08 am TECHNIQUE: CT of the abdomen and pelvis was performed with the administration of intravenous contrast. Multiplanar reformatted images are provided for review. Dose modulation, iterative reconstruction, and/or weight based adjustment of the mA/kV was utilized to reduce the radiation dose to as low as reasonably achievable. COMPARISON: 01/17/2022 HISTORY: ORDERING SYSTEM PROVIDED HISTORY: Abdominal distention, AMS TECHNOLOGIST PROVIDED HISTORY: Reason for exam:->Abdominal distention, AMS Additional Contrast?->None Decision Support Exception - unselect if not a suspected or confirmed emergency medical condition->Emergency Medical Condition (MA) FINDINGS: Many images are partially degraded by patient motion related artifact. Low-attenuation focus in the central aspect of the left lobe of the liver as well as a tiny low-attenuation focus in the caudate lobe. There is also a tiny low-attenuation focus in the extreme inferior aspect of the right lobe. These are difficult to definitively characterize due to their small size but likely represent small hepatic cysts. Gallbladder is unremarkable. Spleen is normal in size. No evidence of acute pancreatitis. There is a E small exophytic cystic appearing lesion along the anterior aspect of the body of the pancreas measuring 7 Hounsfield units and 1.4 cm. This is unchanged dating back to 11/17/2021. This is also favored to be incidental but could be followed. No adrenal mass. No hydronephrosis. The native kidneys remains significantly atrophic and contain numerous cystic lesions. Some of these are hyperdense but also unchanged dating back to the 11/17/2021 exam and favored to represent hyperdense or hemorrhagic cysts. Small renal cortical calcifications and or nonobstructing stones. A right lower quadrant renal transplant demonstrates no hydronephrosis.  Small hiatal hernia. Mild fluid distention of a few small bowel loops in the upper abdomen. No bowel obstruction. The cecum is mobile. The appendix is not identified with certainty. There is some localized thickening of the proximal ascending colon. This does appear to be new compared to the previous examination. Moderate stool in the distal colon. Residual contrast in the colon likely due to contrast administered on the prior examination. Uterus is atrophic. Urinary bladder is largely decompressed with Chin catheter. Minimal gas in the bladder likely due to the catheter. Partial visualization of femoral to femoral bypass graft. Degenerative changes are present in the spine and pelvis. Please see separate dictated report for CT of the chest.     Thickening of the proximal ascending colon. Given the short-term change, this is favored to represent a localized area of colitis likely infectious or inflammatory. Neoplasia thought less likely but follow-up to resolution would be recommended. Moderate stool in the distal colon. Minimal distention of several proximal small bowel loops likely incidental or due to mild enteritis. Small hiatal hernia. Other chronic appearing findings. RECOMMENDATIONS: Unavailable     IR GUIDED IVC FILTER PLACEMENT    Result Date: 2/26/2022  EXAMINATION: INFERIOR VENA CAVA (IVC) FILTER INSERTION 2/26/2022 Procedural Personnel: Jamar Moore MD HISTORY: Indication: Bleeding with IV heparin ORDERING SYSTEM PROVIDED HISTORY: IVC filter TECHNOLOGIST PROVIDED HISTORY: Reason for exam:->IVC filter Anticoagulation contraindicated SEDATION: None CONTRAST: Contrast agent: Isovue 320 Contrast volume: 30mL FLUOROSCOPY DOSE AND TYPE OR TIME AND EXPOSURES: Fluoroscopy time/Number of Images: Fluoroscopy time 1.5 minutes.  Reference [facility] [place] kerma: [id number] PROCEDURE: Informed consent for the procedure including risks, benefits and alternatives was obtained and time-out was performed prior to the procedure. Dugger protocol was followed. A suitable skin site was prepared and draped using all elements of maximal sterile barrier technique including sterile gloves, sterile gown, cap, mask, large sterile sheet, sterile ultrasound probe cover, hand hygiene, and cutaneous antisepsis. Time-out was called and the patient's order and identity were verified. Local anesthesia was administered. The vessel was sonographically evaluated and judged appropriate for access. Real time ultrasound was used to visualize needle entry into the vessel and an ultrasound image was stored in PACS. Vein accessed: Right common femoral vein. Access technique: Micropuncture set with 21 gauge needle A 0.035 guide wire was used to place a catheter into the inferior vena cava. A vena cavogram was performed. A Bard Rosa filter was deployed in routine fashion in the infrarenal IVC under fluoroscopic guidance. The sheath was removed and hemostasis was achieved with manual compression. A sterile dressing was applied. Patient tolerated procedure well. Complications: No immediate complications. Standardized report: [id number] [id number] FINDINGS: US: The access vein is patent. Inferior Vena Cavogram: The vena cava is patent and normal in size without thrombus or anomalies. Post-placement imaging: Spot filmdemonstrates the filter in the infrarenalvena cava with less than 15 degrees tilt from the vena cava access. Successful vena cava filter placement. PLAN: Retrieval intent (MIPS): The filter is potentially retrievable. Plan/Timing For Retrieval: Ordering Physician/Contact For Retrieval Plan: Mau Gonzales     XR CHEST PORTABLE    Result Date: 3/9/2022  EXAMINATION: ONE XRAY VIEW OF THE CHEST 3/9/2022 10:28 am COMPARISON: 02/25/2022 HISTORY: ORDERING SYSTEM PROVIDED HISTORY: ams TECHNOLOGIST PROVIDED HISTORY: Reason for exam:->ams FINDINGS: The cardiac silhouette is at upper limits of normal in size.   There are no findings of failure There is a linear focus seen within the right perihilar region favored to represent atelectasis. There is no focal consolidation seen within the right or left lung to suggest pneumonia. There is no pleural effusion. 1. Right perihilar discoid atelectasis. 2. There are no findings of failure or pneumonia. XR CHEST 1 VIEW    Result Date: 2/25/2022  EXAMINATION: ONE XRAY VIEW OF THE CHEST 2/25/2022 2:49 pm COMPARISON: None. HISTORY: ORDERING SYSTEM PROVIDED HISTORY: needs to be done with VQ scan TECHNOLOGIST PROVIDED HISTORY: Last chest Xray done more than 24 hours ago Reason for exam:->needs to be done with VQ scan FINDINGS: The heart is mildly enlarged. There are no findings of failure. The lungs are clear. There is no focal infiltrate or effusion. .     1. Mild cardiomegaly. There is no evidence of failure or pneumonia. CTA PULMONARY W CONTRAST    Result Date: 2/25/2022  EXAMINATION: CTA OF THE CHEST 2/25/2022 3:08 am TECHNIQUE: CTA of the chest was performed after the administration of intravenous contrast.  Multiplanar reformatted images are provided for review. MIP images are provided for review. Dose modulation, iterative reconstruction, and/or weight based adjustment of the mA/kV was utilized to reduce the radiation dose to as low as reasonably achievable. COMPARISON: Portable chest dated 01/22/2022 and CT dated 01/17/2022 HISTORY: ORDERING SYSTEM PROVIDED HISTORY: Hypoxic, SOB TECHNOLOGIST PROVIDED HISTORY: Reason for exam:->Hypoxic, SOB Decision Support Exception - unselect if not a suspected or confirmed emergency medical condition->Emergency Medical Condition (MA) FINDINGS: Pulmonary Arteries: Assessment is limited due to patient motion artifact. A small or peripheral embolism would be difficult to exclude but no large or central embolism identified. Mediastinum: Moderately severe cardiomegaly is similar to previous. No pericardial effusion. No aortic dissection. Scattered vascular calcifications. Normal-size lymph nodes without adenopathy by size criteria. Lungs/pleura: No pneumothorax. Mild atelectasis in the right greater than left lung base. Otherwise, there has been significant improvement in aeration of the lungs since previous. Upper Abdomen: Partial visualization of the upper right kidney with cystic appearing foci as well as a partially visualized hyperdense lesion likely representing a hyperdense proteinaceous cyst and similar to previous but continued follow-up would be useful. Visualized portions of the upper abdomen demonstrate no acute abnormality. Small hiatal hernia. Soft Tissues/Bones: Degenerative changes are scattered in the spine. Allowing for patient motion artifact, no identified pulmonary embolism. Significant improvement in aeration of the lungs with some residual areas of presumed atelectasis in the right greater than left base. Cardiomegaly. RECOMMENDATIONS: Unavailable     US DUP UPPER EXTREMITY LEFT VENOUS    Result Date: 2/27/2022  EXAMINATION: VENOUS ULTRASOUND OF THE LEFT UPPER EXTREMITY, 2/27/2022 12:55 pm TECHNIQUE: Duplex ultrasound using B-mode/gray scaled imaging and Doppler spectral analysis and color flow was obtained of the deep venous structures of the left upper extremity. COMPARISON: None. HISTORY: ORDERING SYSTEM PROVIDED HISTORY: left arm edema TECHNOLOGIST PROVIDED HISTORY: Portable please if possible Reason for exam:->left arm edema What reading provider will be dictating this exam?->CRC FINDINGS: There is normal flow and compressibility of the visualized venous structures. There is no evidence of echogenic thrombus. The veins demonstrate good compressibility with normal color flow study and spectral analysis. The AV fistula is patent. No evidence of DVT.      IR ULTRASOUND GUIDANCE VASCULAR ACCESS    Result Date: 2/26/2022  EXAMINATION: INFERIOR VENA CAVA (IVC) FILTER INSERTION 2/26/2022 Procedural Personnel: Yuri Landrum Amy Dewitt MD HISTORY: Indication: Bleeding with IV heparin ORDERING SYSTEM PROVIDED HISTORY: IVC filter TECHNOLOGIST PROVIDED HISTORY: Reason for exam:->IVC filter Anticoagulation contraindicated SEDATION: None CONTRAST: Contrast agent: Isovue 320 Contrast volume: 30mL FLUOROSCOPY DOSE AND TYPE OR TIME AND EXPOSURES: Fluoroscopy time/Number of Images: Fluoroscopy time 1.5 minutes. Reference air [place] kerma: [id number] PROCEDURE: Informed consent for the procedure including risks, benefits and alternatives was obtained and time-out was performed prior to the procedure. Universal protocol was followed. A suitable skin site was prepared and draped using all elements of maximal sterile barrier technique including sterile gloves, sterile gown, cap, mask, large sterile sheet, sterile ultrasound probe cover, hand hygiene, and cutaneous antisepsis. Time-out was called and the patient's order and identity were verified. Local anesthesia was administered. The vessel was sonographically evaluated and judged appropriate for access. Real time ultrasound was used to visualize needle entry into the vessel and an ultrasound image was stored in PACS. Vein accessed: Right common femoral vein. Access technique: Micropuncture set with 21 gauge needle A 0.035 guide wire was used to place a catheter into the inferior vena cava. A vena cavogram was performed. A Bard Rosa filter was deployed in routine fashion in the infrarenal IVC under fluoroscopic guidance. The sheath was removed and hemostasis was achieved with manual compression. A sterile dressing was applied. Patient tolerated procedure well. Complications: No immediate complications. Standardized report: [id number] [id number] FINDINGS: US: The access vein is patent. Inferior Vena Cavogram: The vena cava is patent and normal in size without thrombus or anomalies.  Post-placement imaging: Spot filmdemonstrates the filter in the infrarenalvena cava with less than 15 degrees tilt from the vena cava access. Successful vena cava filter placement. PLAN: Retrieval intent (MIPS): The filter is potentially retrievable. Plan/Timing For Retrieval: Ordering Physician/Contact For Retrieval Plan: Malika GUERRIER MODIFIED BARIUM SWALLOW W VIDEO    Result Date: 2/26/2022  EXAMINATION: MODIFIED BARIUM SWALLOW WAS PERFORMED IN CONJUNCTION WITH SPEECH PATHOLOGY SERVICES TECHNIQUE: Fluoroscopic evaluation of the swallowing mechanism was performed using cineradiography with multiple consistency of barium product in conjunction with speech pathology services. FLUOROSCOPY DOSE AND TYPE OR TIME AND EXPOSURES: Fluoroscopy time 2.1 minutes. Air kerma 5.49 mGy COMPARISON: None HISTORY: ORDERING SYSTEM PROVIDED HISTORY: dysphagia, aspriation pna? TECHNOLOGIST PROVIDED HISTORY: Reason for exam:->dysphagia, aspriation pna? FINDINGS: There was oral delay and pharyngeal delay. Laryngeal elevation was adequate. There was retention in the piriform sinuses but not the vallecula. Transient laryngeal penetration is seen with thin liquid barium. Otherwise, no aspiration or laryngeal penetration is seen. Strands it laryngeal penetration with thin liquid barium. No evidence of aspiration. Please see separate speech pathology report for full discussion of findings and recommendations. RECOMMENDATIONS: Unavailable     US DUP LOWER EXTREMITIES BILATERAL VENOUS    Result Date: 2/25/2022  EXAMINATION: DUPLEX VENOUS ULTRASOUND OF THE BILATERAL LOWER EXTREMITIES2/25/2022 2:47 pm TECHNIQUE: Duplex ultrasound using B-mode/gray scaled imaging, Doppler spectral analysis and color flow Doppler was obtained of the deep venous structures of the lower bilateral extremities. COMPARISON: None.  HISTORY: ORDERING SYSTEM PROVIDED HISTORY: R/O DVT TECHNOLOGIST PROVIDED HISTORY: Reason for exam:->R/O DVT What reading provider will be dictating this exam?->CRC FINDINGS: Right lower extremity:  The visualized veins of the bilateral lower extremities are patent and free of echogenic thrombus. The veins demonstrate good compressibility with normal color flow study and spectral analysis. Left lower extremity: There are incompletely occlusive thrombi within the mid and distal left femoral vein and the popliteal vein. They have developed in the interim since the prior lower extremity Doppler from 11/17/2021.     1. INCOMPLETELY OCCLUSIVE THROMBI in the mid and distal LEFT femoral vein and within the popliteal vein. They have developed in the interim since the previous study from 11/17/2021. 2. No evidence of DVT in the right lower extremity. RECOMMENDATIONS: Unavailable     LABS  Recent Labs     03/17/22  0615 03/18/22  0530 03/19/22  0526   WBC 7.5 8.7 9.4   HGB 9.1* 9.8* 10.3*   HCT 30.4* 31.9* 33.9*   .1* 101.3* 102.4*    192 188     Recent Labs     03/17/22  0616 03/17/22  0616 03/18/22  0530 03/18/22  0530 03/19/22  0526     --  149*  --  144   K 3.2*   < > 3.7   < > 3.5   *   < > 113*   < > 108*   CO2 24   < > 26   < > 28   BUN 32*  --  30*  --  28*   CREATININE 1.5*  --  1.4*  --  1.2*   GFRAA 42  --  46  --  54   LABGLOM 35  --  38  --  45   GLUCOSE 134*  --  171*  --  111*   PROT 4.5*  --  4.7*  --  4.4*   LABALBU 2.8*  --  3.1*  --  3.0*   CALCIUM 10.2  --  10.6*  --  10.2   BILITOT 0.5  --  0.4  --  0.4   ALKPHOS 58  --  43  --  44   AST 19  --  20  --  20   ALT 17  --  16  --  17    < > = values in this interval not displayed. No results for input(s): PROCAL in the last 72 hours.   Lab Results   Component Value Date    CRP 18.3 (H) 01/23/2022    CRP 34.6 (H) 11/17/2021     Lab Results   Component Value Date    SEDRATE 61 (H) 01/23/2022    SEDRATE 62 (H) 11/20/2021     No results found for: [id number]  Lab Results   Component Value Date    COVID19 Not Detected 03/02/2022    COVID19 Not Detected 02/25/2022     COVID-19/MARINA-COV2 LABS  Recent Labs     03/17/22  0616 03/18/22  0530 03/19/22  0526   FERRITIN 745  --   --    AST 19 20 20   ALT 17 16 17      MICROBIOLOGY:  Lab Results   Component Value Date    BC 5 Days no growth 03/09/2022    BC 5 Days no growth 02/27/2022    BC  02/25/2022     This organism was isolated in one set. Susceptibility testing is not routinely done as this  organism frequently represents skin contamination. Additional testing can be ordered by calling the  Microbiology Department.       ORG Pseudomonas aeruginosa 03/09/2022    ORG Staphylococcus coagulase-negative 02/25/2022    ORG Enterococcus faecalis 01/12/2022     Lab Results   Component Value Date    BLOODCULT2 5 Days no growth 03/09/2022    BLOODCULT2 5 Days no growth 02/25/2022    BLOODCULT2 5 Days no growth 01/24/2022    ORG Pseudomonas aeruginosa 03/09/2022    ORG Staphylococcus coagulase-negative 02/25/2022    ORG Enterococcus faecalis 01/12/2022     WOUND/ABSCESS   Date Value Ref Range Status   01/24/2022 Growth not present  Final     Urine Culture, Routine   Date Value Ref Range Status   03/09/2022 75,000 CFU/ml  Final   02/26/2022 Growth not present  Final   01/23/2022 Growth not present  Final     MRSA Culture Only   Date Value Ref Range Status   11/17/2021 Methicillin resistant Staph aureus not isolated  Final     FINAL IMPRESSION    Patient is a 73 y.o. female who presented with   Chief Complaint   Patient presents with    Altered Mental Status     from NH    and admitted for Confusion [R41.0]  Acute respiratory failure with hypoxia ([address] [J96.01]  Acute respiratory failure with hypoxemia ([facility]) [J96.01]     · PT WITH ENCEPHALOPTHAY METABOLIC VS INFECTIOUS ETIOLOGY -improved   · H/O hsv IG G +   · S/P RX ASPIRATION PNEUMONIA/COLITIS  · H/O COVID  · KIDNEY TRANSPLANT ON IMMUNOSUPPRESSIVE  · Poss UTI Pseudomonas    · MASTOIDITIS/SINUSITIS    Plan:  · Continue dificid for 2 more days   · Continue acyclovir- IV will switch to PO in another day or so- suppressive   · On levaquin IV- Day #7 / 8  · Cont current care  · PT TAKING PO     UPDATED FAMILY   Imaging and labs were reviewed per medical records      Thank you for involving me in the care of Fleet Goltz. Please do not hesitate to call for any questions or concerns.      Electronically signed by Valentin Ramos MD on 3/19/2022 at 2:19 PM

## 2022-03-20 LAB
ALBUMIN SERPL-MCNC: 2.7 G/DL (ref 3.5–5.2)
ALP BLD-CCNC: 43 U/L (ref 35–104)
ALT SERPL-CCNC: 32 U/L (ref 0–32)
ANION GAP SERPL CALCULATED.3IONS-SCNC: 9 MMOL/L (ref 7–16)
AST SERPL-CCNC: 50 U/L (ref 0–31)
BASOPHILS ABSOLUTE: 0 E9/L (ref 0–0.2)
BASOPHILS RELATIVE PERCENT: 0 % (ref 0–2)
BILIRUB SERPL-MCNC: 0.5 MG/DL (ref 0–1.2)
BUN BLDV-MCNC: 26 MG/DL (ref 6–23)
CALCIUM SERPL-MCNC: 9.5 MG/DL (ref 8.6–10.2)
CHLORIDE BLD-SCNC: 101 MMOL/L (ref 98–107)
CO2: 27 MMOL/L (ref 22–29)
CREAT SERPL-MCNC: 1.3 MG/DL (ref 0.5–1)
EOSINOPHILS ABSOLUTE: 0.21 E9/L (ref 0.05–0.5)
EOSINOPHILS RELATIVE PERCENT: 2 % (ref 0–6)
GFR AFRICAN AMERICAN: 50
GFR NON-AFRICAN AMERICAN: 41 ML/MIN/1.73
GLUCOSE BLD-MCNC: 92 MG/DL (ref 74–99)
HCT VFR BLD CALC: 32 % (ref 34–48)
HEMOGLOBIN: 9.7 G/DL (ref 11.5–15.5)
HYPOCHROMIA: ABNORMAL
LYMPHOCYTES ABSOLUTE: 1.68 E9/L (ref 1.5–4)
LYMPHOCYTES RELATIVE PERCENT: 16 % (ref 20–42)
MCH RBC QN AUTO: 31.1 PG (ref 26–35)
MCHC RBC AUTO-ENTMCNC: 30.3 % (ref 32–34.5)
MCV RBC AUTO: 102.6 FL (ref 80–99.9)
METAMYELOCYTES RELATIVE PERCENT: 2 % (ref 0–1)
MONOCYTES ABSOLUTE: 0.52 E9/L (ref 0.1–0.95)
MONOCYTES RELATIVE PERCENT: 5 % (ref 2–12)
MYELOCYTE PERCENT: 1 % (ref 0–0)
NEUTROPHILS ABSOLUTE: 8.09 E9/L (ref 1.8–7.3)
NEUTROPHILS RELATIVE PERCENT: 74 % (ref 43–80)
PDW BLD-RTO: 17.7 FL (ref 11.5–15)
PLATELET # BLD: 183 E9/L (ref 130–450)
PMV BLD AUTO: 12.2 FL (ref 7–12)
POLYCHROMASIA: ABNORMAL
POTASSIUM SERPL-SCNC: 3.6 MMOL/L (ref 3.5–5)
RBC # BLD: 3.12 E12/L (ref 3.5–5.5)
SODIUM BLD-SCNC: 137 MMOL/L (ref 132–146)
TOTAL PROTEIN: 4.3 G/DL (ref 6.4–8.3)
WBC # BLD: 10.5 E9/L (ref 4.5–11.5)

## 2022-03-20 PROCEDURE — 6370000000 HC RX 637 (ALT 250 FOR IP): Performed by: SPECIALIST

## 2022-03-20 PROCEDURE — 6370000000 HC RX 637 (ALT 250 FOR IP): Performed by: INTERNAL MEDICINE

## 2022-03-20 PROCEDURE — 94640 AIRWAY INHALATION TREATMENT: CPT

## 2022-03-20 PROCEDURE — 6360000002 HC RX W HCPCS: Performed by: SPECIALIST

## 2022-03-20 PROCEDURE — 36415 COLL VENOUS BLD VENIPUNCTURE: CPT

## 2022-03-20 PROCEDURE — 2580000003 HC RX 258: Performed by: INTERNAL MEDICINE

## 2022-03-20 PROCEDURE — 80053 COMPREHEN METABOLIC PANEL: CPT

## 2022-03-20 PROCEDURE — 6360000002 HC RX W HCPCS: Performed by: INTERNAL MEDICINE

## 2022-03-20 PROCEDURE — 85025 COMPLETE CBC W/AUTO DIFF WBC: CPT

## 2022-03-20 PROCEDURE — 2580000003 HC RX 258: Performed by: SPECIALIST

## 2022-03-20 PROCEDURE — 2700000000 HC OXYGEN THERAPY PER DAY

## 2022-03-20 PROCEDURE — 1200000000 HC SEMI PRIVATE

## 2022-03-20 RX ORDER — MYCOPHENOLATE MOFETIL 250 MG/1
500 CAPSULE ORAL 2 TIMES DAILY
Status: DISCONTINUED | OUTPATIENT
Start: 2022-03-20 | End: 2022-03-23 | Stop reason: HOSPADM

## 2022-03-20 RX ORDER — CYCLOSPORINE 25 MG/1
100 CAPSULE, LIQUID FILLED ORAL 2 TIMES DAILY
Status: DISCONTINUED | OUTPATIENT
Start: 2022-03-20 | End: 2022-03-23 | Stop reason: HOSPADM

## 2022-03-20 RX ORDER — DOCUSATE SODIUM 100 MG/1
100 CAPSULE, LIQUID FILLED ORAL 2 TIMES DAILY
Status: DISCONTINUED | OUTPATIENT
Start: 2022-03-20 | End: 2022-03-23 | Stop reason: HOSPADM

## 2022-03-20 RX ORDER — VANCOMYCIN HYDROCHLORIDE 125 MG/1
CAPSULE ORAL
Qty: 111 CAPSULE | Refills: 0 | DISCHARGE
Start: 2022-03-20 | End: 2022-04-21 | Stop reason: DRUGHIGH

## 2022-03-20 RX ORDER — ACYCLOVIR 400 MG/1
400 TABLET ORAL 2 TIMES DAILY
Qty: 60 TABLET | Refills: 0 | Status: SHIPPED | OUTPATIENT
Start: 2022-03-20 | End: 2022-04-19

## 2022-03-20 RX ADMIN — MYCOPHENOLATE MOFETIL 500 MG: 250 CAPSULE ORAL at 11:08

## 2022-03-20 RX ADMIN — CYCLOSPORINE 100 MG: 25 CAPSULE, LIQUID FILLED ORAL at 20:00

## 2022-03-20 RX ADMIN — FIDAXOMICIN 200 MG: 200 TABLET, FILM COATED ORAL at 09:21

## 2022-03-20 RX ADMIN — HEPARIN 100 UNITS: 100 SYRINGE at 20:05

## 2022-03-20 RX ADMIN — ACYCLOVIR SODIUM 350 MG: 1000 INJECTION, SOLUTION INTRAVENOUS at 11:09

## 2022-03-20 RX ADMIN — IPRATROPIUM BROMIDE AND ALBUTEROL SULFATE 1 AMPULE: .5; 2.5 SOLUTION RESPIRATORY (INHALATION) at 10:22

## 2022-03-20 RX ADMIN — CLOZAPINE 50 MG: 25 TABLET ORAL at 20:01

## 2022-03-20 RX ADMIN — MAGNESIUM OXIDE 400 MG: 400 TABLET ORAL at 09:22

## 2022-03-20 RX ADMIN — LEVOTHYROXINE SODIUM 50 MCG: 0.05 TABLET ORAL at 06:08

## 2022-03-20 RX ADMIN — IPRATROPIUM BROMIDE AND ALBUTEROL SULFATE 1 AMPULE: .5; 2.5 SOLUTION RESPIRATORY (INHALATION) at 18:53

## 2022-03-20 RX ADMIN — VENLAFAXINE 37.5 MG: 37.5 TABLET ORAL at 16:28

## 2022-03-20 RX ADMIN — VENLAFAXINE 37.5 MG: 37.5 TABLET ORAL at 09:30

## 2022-03-20 RX ADMIN — SODIUM BICARBONATE 650 MG: 648 TABLET ORAL at 09:21

## 2022-03-20 RX ADMIN — ONDANSETRON 4 MG: 4 TABLET, ORALLY DISINTEGRATING ORAL at 11:56

## 2022-03-20 RX ADMIN — APIXABAN 5 MG: 5 TABLET, FILM COATED ORAL at 09:21

## 2022-03-20 RX ADMIN — PANTOPRAZOLE SODIUM 40 MG: 40 TABLET, DELAYED RELEASE ORAL at 06:08

## 2022-03-20 RX ADMIN — Medication 10 ML: at 21:40

## 2022-03-20 RX ADMIN — IPRATROPIUM BROMIDE AND ALBUTEROL SULFATE 1 AMPULE: .5; 2.5 SOLUTION RESPIRATORY (INHALATION) at 13:16

## 2022-03-20 RX ADMIN — CYCLOSPORINE 100 MG: 25 CAPSULE, LIQUID FILLED ORAL at 09:21

## 2022-03-20 RX ADMIN — ACYCLOVIR SODIUM 350 MG: 1000 INJECTION, SOLUTION INTRAVENOUS at 21:49

## 2022-03-20 RX ADMIN — IPRATROPIUM BROMIDE AND ALBUTEROL SULFATE 1 AMPULE: .5; 2.5 SOLUTION RESPIRATORY (INHALATION) at 07:04

## 2022-03-20 RX ADMIN — MYCOPHENOLATE MOFETIL 500 MG: 250 CAPSULE ORAL at 20:01

## 2022-03-20 RX ADMIN — PREDNISONE 5 MG: 5 TABLET ORAL at 09:22

## 2022-03-20 RX ADMIN — APIXABAN 5 MG: 5 TABLET, FILM COATED ORAL at 20:01

## 2022-03-20 RX ADMIN — DOCUSATE SODIUM 100 MG: 100 CAPSULE, LIQUID FILLED ORAL at 09:21

## 2022-03-20 RX ADMIN — DOCUSATE SODIUM 100 MG: 100 CAPSULE, LIQUID FILLED ORAL at 20:01

## 2022-03-20 RX ADMIN — ALLOPURINOL 100 MG: 100 TABLET ORAL at 09:21

## 2022-03-20 RX ADMIN — MIDODRINE HYDROCHLORIDE 5 MG: 5 TABLET ORAL at 09:21

## 2022-03-20 RX ADMIN — FIDAXOMICIN 200 MG: 200 TABLET, FILM COATED ORAL at 20:01

## 2022-03-20 RX ADMIN — IPRATROPIUM BROMIDE AND ALBUTEROL SULFATE 1 AMPULE: .5; 2.5 SOLUTION RESPIRATORY (INHALATION) at 01:52

## 2022-03-20 RX ADMIN — ACYCLOVIR SODIUM 350 MG: 1000 INJECTION, SOLUTION INTRAVENOUS at 04:03

## 2022-03-20 RX ADMIN — FOLIC ACID 1 MG: 1 TABLET ORAL at 09:22

## 2022-03-20 RX ADMIN — HEPARIN 100 UNITS: 100 SYRINGE at 09:29

## 2022-03-20 RX ADMIN — ATORVASTATIN CALCIUM 10 MG: 10 TABLET, FILM COATED ORAL at 20:01

## 2022-03-20 ASSESSMENT — PAIN SCALES - WONG BAKER
WONGBAKER_NUMERICALRESPONSE: 0

## 2022-03-20 ASSESSMENT — PAIN SCALES - GENERAL
PAINLEVEL_OUTOF10: 0

## 2022-03-20 NOTE — PLAN OF CARE
Problem: Falls - Risk of:  Goal: Will remain free from falls  Description: Will remain free from falls  Outcome: Ongoing  Goal: Absence of physical injury  Description: Absence of physical injury  Outcome: Ongoing     Problem: Skin Integrity:  Goal: Will show no infection signs and symptoms  Description: Will show no infection signs and symptoms  Outcome: Ongoing  Goal: Absence of new skin breakdown  Description: Absence of new skin breakdown  Outcome: Ongoing     Problem: Coping:  Goal: Ability to remain calm will improve  Description: Ability to remain calm will improve  Outcome: Ongoing     Problem: Safety:  Goal: Ability to remain free from injury will improve  Description: Ability to remain free from injury will improve  Outcome: Ongoing     Problem: Self-Care:  Goal: Ability to participate in self-care as condition permits will improve  Description: Ability to participate in self-care as condition permits will improve  Outcome: Ongoing     Problem: Mental Status - Impaired:  Goal: Mental status will improve  Description: Mental status will improve  Outcome: Ongoing

## 2022-03-20 NOTE — PROGRESS NOTES
Progress Note  Date:3/20/2022       Room:0415/0415-01  Patient Lindsay Mcdonald     YOB: 1955     Age:66 y.o. Subjective    Subjective:  Symptoms:  Improved. (Alert and awake , and talkative ). Diet:  Dietary issues: eating breakfast.    Activity level: Impaired due to weakness. Pain:  She reports no pain. Review of Systems  Objective         Vitals Last 24 Hours:  TEMPERATURE:  Temp  Av.9 °F (36.6 °C)  Min: 97.6 °F (36.4 °C)  Max: 98.1 °F (36.7 °C)  RESPIRATIONS RANGE: Resp  Av  Min: 18  Max: 18  PULSE OXIMETRY RANGE: SpO2  Av %  Min: 97 %  Max: 97 %  PULSE RANGE: Pulse  Av.3  Min: 90  Max: 92  BLOOD PRESSURE RANGE: Systolic (42ZFI), TKT:273 , Min:97 , MHR:971   ; Diastolic (99KYC), VZH:14, Min:56, Max:78    I/O (24Hr): Intake/Output Summary (Last 24 hours) at 3/20/2022 1639  Last data filed at 3/20/2022 1450  Gross per 24 hour   Intake 365 ml   Output 850 ml   Net -485 ml     Objective:  General Appearance: In no acute distress and comfortable (alert and awake , oriented x 3 ). Vital signs: (most recent): Blood pressure (!) 97/56, pulse 92, temperature 97.6 °F (36.4 °C), temperature source Axillary, resp. rate 18, height 5' 2\" (1.575 m), weight 226 lb (102.5 kg), SpO2 97 %. Vital signs are normal.    Output: Producing urine. HEENT: Normal HEENT exam.    Lungs:  Normal effort and normal respiratory rate. There are decreased breath sounds. Heart: Normal rate. Regular rhythm. Positive for murmur. Abdomen: Abdomen is soft and distended. Bowel sounds are normal.   There is no abdominal tenderness. Extremities: (Edema +1   Both LL )  Pulses: Distal pulses are intact. Neurological: Patient is alert and oriented to person, place and time. Pupils:  Pupils are equal, round, and reactive to light.       Labs/Imaging/Diagnostics    Labs:  CBC:  Recent Labs     22  0530 22  0526 22  0600   WBC 8.7 9.4 10.5   RBC 3.15* 3.31* 3.12*   HGB 9.8* 10.3* 9.7*   HCT 31.9* 33.9* 32.0*   .3* 102.4* 102.6*   RDW 18.7* 18.3* 17.7*    188 183     CHEMISTRIES:  Recent Labs     03/18/22  0530 03/19/22  0526 03/20/22  0600   * 144 137   K 3.7 3.5 3.6   * 108* 101   CO2 26 28 27   BUN 30* 28* 26*   CREATININE 1.4* 1.2* 1.3*   GLUCOSE 171* 111* 92     PT/INR:No results for input(s): PROTIME, INR in the last 72 hours. APTT:No results for input(s): APTT in the last 72 hours. LIVER PROFILE:  Recent Labs     03/18/22  0530 03/19/22  0526 03/20/22  0600   AST 20 20 50*   ALT 16 17 32   BILITOT 0.4 0.4 0.5   ALKPHOS 43 44 43       Imaging Last 24 Hours:  XR HUMERUS LEFT (MIN 2 VIEWS)    Result Date: 3/10/2022  EXAMINATION: TWO XRAY VIEWS OF THE LEFT HUMERUS 3/10/2022 9:31 am COMPARISON: None. HISTORY: ORDERING SYSTEM PROVIDED HISTORY: limited mobility TECHNOLOGIST PROVIDED HISTORY: Reason for exam:->limited mobility FINDINGS: There is no evidence of fracture or dislocation. No significant osteo-degenerative changes or other osseous abnormalities are identified. There is increased separation of the humeral head from the osseous glenoid and AC joint. This could represent subluxation. The presence of a large synovial effusion, hematoma, or mass cannot be excluded. CT scan of the left shoulder can be considered for further evaluation. Surgical clips are identified in the soft tissues about the mid to distal humerus. No fracture or dislocation. Increased separation of the humeral head from the osseous glenoid and AC joint, which could represent subluxation. A large synovial effusion, hematoma, or mass cannot be excluded. CT scan of the left shoulder can be considered for further evaluation.      CT HEAD WO CONTRAST    Result Date: 3/10/2022  EXAMINATION: CT OF THE HEAD WITHOUT CONTRAST  3/10/2022 3:29 pm TECHNIQUE: CT of the head was performed without the administration of intravenous contrast. Dose modulation, iterative reconstruction, and/or weight based adjustment of the mA/kV was utilized to reduce the radiation dose to as low as reasonably achievable. COMPARISON: February 25, 2022 HISTORY: ORDERING SYSTEM PROVIDED HISTORY: AMS TECHNOLOGIST PROVIDED HISTORY: Reason for exam:->AMS Has a \"code stroke\" or \"stroke alert\" been called? ->No FINDINGS: BRAIN/VENTRICLES: There are age related cortical atrophy and periventricular white matter ischemic changes. There is no acute intracranial hemorrhage, mass effect or midline shift. No abnormal extra-axial fluid collection. The gray-white differentiation is maintained without evidence of an acute infarct. There is no evidence of hydrocephalus. ORBITS: The visualized portion of the orbits demonstrate no acute abnormality. SINUSES: There is partial opacification of bilateral mastoid air cells. Mucosal thickening in the right sphenoid sinus. SOFT TISSUES/SKULL:  No acute abnormality of the visualized skull or soft tissues. No acute intracranial abnormality or hemorrhage. Bilateral mastoiditis and right sphenoid sinusitis. IR FLUORO GUIDED CVA DEVICE PLMT/REPLACE/REMOVAL    Result Date: 3/11/2022  PROCEDURE: ULTRASOUND GUIDED VASCULAR ACCESS. FLUOROSCOPY GUIDED PICC PLACEMENT 3/11/2022. HISTORY: ORDERING SYSTEM PROVIDED HISTORY: picc line/ medline TECHNOLOGIST PROVIDED HISTORY: Reason for exam:->picc line/ medline SEDATION: None FLUOROSCOPY DOSE AND TYPE OR TIME AND EXPOSURES: Fluoroscopy time equals 8.2 minutes. Total dose equals 116 mGy TECHNIQUE: The procedure was performed under ultrasound and fluoroscopic guidance. And ultrasound image and fluoroscopic image was obtained for medical records. [facility] FINDINGS: The patient's right arm was prepped and draped in a sterile fashion using a maximum sterile barrier technique. Following the uneventful administration of lidocaine I introduced a micro puncture needle into the right basilic vein using ultrasound guidance.  Through the micropuncture needle a microwire was introduced into the vein. Over the microwire a peel-away sheath was placed within the vein. A catheter measurement was obtained. The catheter was then trimmed to 25 cm and introduced over the wire into the right basilic vein. The catheter tip was placed within the right subclavian vein. .  The peel-away sheath was then removed and the PICC line was secured to the skin. A sterile dressing was applied. The patient tolerated the procedure well and no complications. A fluoroscopic image was obtained which confirms position of the PICC line catheter. Successful placement of a dual lumen power PICC line. The tip of the PICC line catheter was placed within the distal right subclavian vein. Yessi Morel IMPRESSION: Successful ultrasound and fluoroscopy guided PICC placement     XR SHOULDER LEFT (MIN 2 VIEWS)    Result Date: 3/10/2022  EXAMINATION: THREE XRAY VIEWS OF THE LEFT SHOULDER 3/10/2022 9:31 am COMPARISON: None HISTORY: ORDERING SYSTEM PROVIDED HISTORY: limited mobility and bruising TECHNOLOGIST PROVIDED HISTORY: Reason for exam:->limited mobility and bruising FINDINGS: There is no evidence of fracture. There is increased separation of the humeral head from the osseous glenoid and the acromioclavicular joint, which could represent subluxation. Otherwise, the presence of a large synovial effusion, hematoma, or mass cannot be excluded. Clinical correlation is recommended. CT scan of the left shoulder can be performed for further evaluation. The heart is enlarged. Surgical clips are identified in the soft tissues surrounding the midportion of the humerus. No fracture. Increased separation of the humeral head from the osseous glenoid and AC joint, which could represent subluxation. Otherwise, a large synovial effusion, hematoma, or mass cannot be excluded. Clinical correlation is recommended. CT scan of the left shoulder can be performed for further evaluation. Cardiomegaly. IR ULTRASOUND GUIDANCE VASCULAR ACCESS    Result Date: 3/11/2022  PROCEDURE: ULTRASOUND GUIDED VASCULAR ACCESS. FLUOROSCOPY GUIDED PICC PLACEMENT 3/11/2022. HISTORY: ORDERING SYSTEM PROVIDED HISTORY: picc line/ medline TECHNOLOGIST PROVIDED HISTORY: Reason for exam:->picc line/ medline SEDATION: None FLUOROSCOPY DOSE AND TYPE OR TIME AND EXPOSURES: Fluoroscopy time equals 8.2 minutes. Total dose equals 116 mGy TECHNIQUE: The procedure was performed under ultrasound and fluoroscopic guidance. And ultrasound image and fluoroscopic image was obtained for medical records. [place] FINDINGS: The patient's right arm was prepped and draped in a sterile fashion using a maximum sterile barrier technique. Following the uneventful administration of lidocaine I introduced a micro puncture needle into the right basilic vein using ultrasound guidance. Through the micropuncture needle a microwire was introduced into the vein. Over the microwire a peel-away sheath was placed within the vein. A catheter measurement was obtained. The catheter was then trimmed to 25 cm and introduced over the wire into the right basilic vein. The catheter tip was placed within the right subclavian vein. .  The peel-away sheath was then removed and the PICC line was secured to the skin. A sterile dressing was applied. The patient tolerated the procedure well and no complications. A fluoroscopic image was obtained which confirms position of the PICC line catheter. Successful placement of a dual lumen power PICC line. The tip of the PICC line catheter was placed within the distal right subclavian vein. [place]  IMPRESSION: Successful ultrasound and fluoroscopy guided PICC placement     Assessment//Plan           Hospital Problems           Last Modified POA    * (Principal) Acute respiratory failure with hypoxia ([address] 3/9/2022 Yes    Kidney transplanted 3/10/2022 Yes    Elevated troponin 3/10/2022 Yes    Paroxysmal atrial fibrillation ([address] 3/10/2022 Yes    Hypotension 3/10/2022 Yes    LBBB (left bundle branch block) 3/10/2022 Yes    History of DVT (deep vein thrombosis) 3/10/2022 Yes    Chronic anticoagulation 3/10/2022 Yes    Acute respiratory failure with hypoxemia ([address] 3/11/2022 Yes        Assessment:    Condition: In stable condition. Improving. (Sepsis ? Cause aspiration pneumonia  Was on zosyn and vanc , now on  levaquin ,   Acute respiratory failure secondary to above on o2 ,better  ,   Metabolic encephalopathy in immuno compromised host , on acyclovir for possible  herpes simplex  Encephalitis , mental status   significantly Improved    D/c NG tube continue oral intake   Metabolic acidosis , due to acute on chronic renal failure , creatinine better 1.2  cdiff colitis on  deficid  Left femoral and popliteal DVT on eliquis . S/p IVC filter placement   Acute on chronic renal failure , on  iv fluids  Creatinine better 1.2  hyperlpid , continue lipitor   Bipolar disorder on pristiq  And clozaril  Chronic renal  failure s/p renal transplant 2015 . On cellcept and cyclosporine   Hypothyroidism on levothyroxine . Hypotension resolved ,on midodrin as needed   Delirium / metabolic encephalopathy secondary to above  Resolved d/c tube feeding tolerating oral intake , on  soft diet and thin liquids ,  Metabolic acidosis due to renal failure   On hco3   Malnutrition and edema , spa and lasix x3   Anemia of chronic disease transfuse 1 unit of PRBCS . Hb better on epogen   Hypernatremia, on free water intake through the NG tube,  Better / resolved  For discharge to rehab once precert is done      ).        Electronically signed by Morgan Childers MD on 3/11/22 at 8:21 PM EST

## 2022-03-20 NOTE — PROGRESS NOTES
[facility] Jaime Culp Overall, MD Bernal, MD Milad Demarco, MD Angie Briceño MD  Progress Note    Patient's Name: Agustin Escalante  1:59 PM  3/20/2022    Subjective  3/13 : seen in her room  Weak decondioned lying flat in bed , poor po intake , clinically euvolemic   3/14: NG tube not functioning, just replaced. No meds all day as a result. IV fluids been stopped for few hours, as well. 3/15 : seen today , o2/nc . Appear comfortable . TF going at goal . Per RN pt is more alert and awake   UE edema B/L 2+ .   3/17 : clinically better wake , talktive ,edema in UE better on TF     3/18 : on RA , alert awake ,oriented . No LE edema . Appear comfortable   3/19: Patient doing rather well, off of tube feeding patient started getting puréed food discussed the case with the son was at the bedside overall condition rather optimistic improving.  3/20: Patient was seen and evaluated in her room, no new problems she was having lunch without any complications of gag or other shortness of breath. History of Present Ilness:      Patient has h/o preserved left ventricular systolic function (CLEMENCIA  6/74/3393), stress test with no significant reversibility 6/16/2015, paroxysmal atrial fibrillation (on Eliquis), chronic left bundle branch block, HTN, HLD,  s/p kidney transplant,CKD, H/o DVT, s/p IVC on AC now, PVD, s/p thyroidectomy for cancer, gout, obesity     She was recently treated in hospitalfrom 2/25/2020 to 3/7/2022 because of acute hypoxic respiratory failure likely due to aspiration pneumonia with positive blood culture and acute metabolic encephalopathy     Patient was brought from [address] [place] [address] and rehab facility to ED of [facility] on 3/9/22 due to new mental status changes. Nephrology asked to see for CECILY /CKD   Pt has hx of cadaveric kidney transplant 7 years back   Her baseline cr has been 1.2-1.6 with a lot of fluctuation in contest of dehydration .    Pt seen in her room , she is very weak and deconditoned to my eyes she has lost a lot of weight .    She has a hassan in place   She has poor po intake   She is euvolemic to mildly hypovolemic       Allergies:  Macrodantin [nitrofurantoin macrocrystal] and Sulfa antibiotics    Current Medications:    cycloSPORINE modified (NEORAL) capsule 100 mg, BID  docusate sodium (COLACE) capsule 100 mg, BID  mycophenolate (CELLCEPT) capsule 500 mg, BID  acyclovir (ZOVIRAX) 350 mg in dextrose 5 % 50 mL IVPB, Q8H  sodium bicarbonate tablet 650 mg, Daily  0.9 % sodium chloride infusion, PRN  venlafaxine (EFFEXOR) tablet 37.5 mg, BID WC  epoetin casey-epbx (RETACRIT) injection 2,000 Units, Q7 Days   And  epoetin casey-epbx (RETACRIT) injection 3,000 Units, Q7 Days  magnesium oxide (MAG-OX) tablet 400 mg, Daily  potassium chloride (KLOR-CON M) extended release tablet 20 mEq, Once  ipratropium-albuterol (DUONEB) nebulizer solution 1 ampule, Q4H  midodrine (PROAMATINE) tablet 5 mg, BID PRN  lidocaine 1 % injection 5 mL, Once  sodium chloride flush 0.9 % injection 5-40 mL, 2 times per day  sodium chloride flush 0.9 % injection 5-40 mL, PRN  0.9 % sodium chloride infusion, PRN  heparin flush 100 UNIT/ML injection 100 Units, 2 times per day  heparin flush 100 UNIT/ML injection 100 Units, PRN  [Held by provider] metoprolol tartrate (LOPRESSOR) tablet 12.5 mg, BID  Fidaxomicin (DIFICID) tablet 200 mg, BID  levoFLOXacin (LEVAQUIN) 750 MG/150ML infusion 750 mg, Q48H  apixaban (ELIQUIS) tablet 5 mg, BID  ondansetron (ZOFRAN-ODT) disintegrating tablet 4 mg, Q8H PRN  atorvastatin (LIPITOR) tablet 10 mg, Nightly  cloZAPine (CLOZARIL) tablet 50 mg, Nightly  levothyroxine (SYNTHROID) tablet 50 mcg, Daily  pantoprazole (PROTONIX) tablet 40 mg, QAM AC  predniSONE (DELTASONE) tablet 5 mg, Daily  allopurinol (ZYLOPRIM) tablet 325 mg, Daily  folic acid (FOLVITE) tablet 1 mg, Daily        Review of Systems:   Constitutional: weakness   Eyes: no eye pain , no itching , no drainage  Ears, nose, mouth, throat, and face: no ear ,nose pain , hearing is ok ,no nasal drainage   Respiratory: no sob ,no cough ,no wheezing . Cardiovascular: no chest pain , no palpitation ,no sob . Gastrointestinal: no nausea, vomiting , constipation , no abdominal pain . Genitourinary:no urinary retention , no burning , dysuria . No polyuria   Hematologic/lymphatic: no bleeding , no cougulation issues . Musculoskeletal:no joint pain , no swelling . Neurological: no headaches ,no weakness , no numbness . Endocrine: no thirst , no weight issues . Physical exam:   Vital signs BP (!) 97/56   Pulse 92   Temp 97.6 °F (36.4 °C) (Axillary)   Resp 18   Ht 5' 2\" (1.575 m)   Wt 226 lb (102.5 kg)   SpO2 97%   BMI 41.34 kg/m²   Gen : In NAD , appears stated age . Head : NC/AT, EOMI, sclera and conjunctive are clear and anicteric. Mucous membranes dry  Neck : supple , no thyromegaly noted . Trachea midline  CV : regular rhythm, normal S1 and S2, No M/R/G . Lungs: CTAB , no wheezing , good air entry in all fields, though poor inspiratory effort  Abd : soft , NT , BS +   Skin : soft, dry . Neuro : CN  II-XII grossly intact , no focal neurologic deficit .    Psych : affect flat, minimally responsive, mostly to tactile stimulation, does not answer questions or follow commands    Data:   Labs:  CBC with Differential:    Lab Results   Component Value Date    WBC 10.5 03/20/2022    RBC 3.12 03/20/2022    HGB 9.7 03/20/2022    HCT 32.0 03/20/2022     03/20/2022    .6 03/20/2022    MCH 31.1 03/20/2022    MCHC 30.3 03/20/2022    RDW 17.7 03/20/2022    NRBC 1.0 03/18/2022    METASPCT 2.0 03/20/2022    LYMPHOPCT 16.0 03/20/2022    MONOPCT 5.0 03/20/2022    MYELOPCT 1.0 03/20/2022    BASOPCT 0.0 03/20/2022    MONOSABS 0.52 03/20/2022    LYMPHSABS 1.68 03/20/2022    EOSABS 0.21 03/20/2022    BASOSABS 0.00 03/20/2022     CMP:    Lab Results   Component Value Date     03/20/2022    K 3.6 03/20/2022    K 4.7 03/09/2022     03/20/2022    CO2 27 03/20/2022    BUN 26 03/20/2022    CREATININE 1.3 03/20/2022    GFRAA 50 03/20/2022    LABGLOM 41 03/20/2022    GLUCOSE 92 03/20/2022    PROT 4.3 03/20/2022    LABALBU 2.7 03/20/2022    CALCIUM 9.5 03/20/2022    BILITOT 0.5 03/20/2022    ALKPHOS 43 03/20/2022    AST 50 03/20/2022    ALT 32 03/20/2022     Ionized Calcium:  No results found for: IONCA  Magnesium:    Lab Results   Component Value Date    MG 1.7 03/16/2022     Phosphorus:    Lab Results   Component Value Date    PHOS 3.0 03/16/2022     U/A:    Lab Results   Component Value Date    COLORU Yellow 03/09/2022    PHUR 5.0 03/09/2022    WBCUA 1-3 03/09/2022    RBCUA NONE 03/09/2022    RBCUA 10-20 03/12/2014    YEAST Present 04/09/2021    BACTERIA MODERATE 03/09/2022    CLARITYU Clear 03/09/2022    SPECGRAV 1.025 03/09/2022    LEUKOCYTESUR Negative 03/09/2022    UROBILINOGEN 0.2 03/09/2022    BILIRUBINUR Negative 03/09/2022    BLOODU Negative 03/09/2022    GLUCOSEU Negative 03/09/2022     Microalbumen/Creatinine ratio:  No components found for: RUCREAT  Iron Saturation:  No components found for: PERCENTFE  TIBC:    Lab Results   Component Value Date    TIBC 92 03/17/2022     FERRITIN:    Lab Results   Component Value Date    FERRITIN 745 03/17/2022        Imaging:  XR CHEST ABDOMEN NG PLACEMENT   Final Result   The enteric tube terminates in the stomach. XR CHEST PORTABLE   Final Result   Increased opacities in left lung base related to increasing subsegmental   atelectasis or pleural effusion. XR ABDOMEN FOR NG/OG/NE TUBE PLACEMENT   Final Result   Satisfactory position of the enteric tube. XR CHEST PORTABLE   Final Result   Addendum 1 of 1   ADDENDUM:   The floor reports slow nasogastric tube output. Based on positioning on   plain film, recommend further advancement. Final   1. Possible right pleural effusion and right basilar atelectasis.    2. Cardiomegaly without pulmonary venous hypertension. 3. Possible hiatal hernia. XR ABDOMEN FOR NG/OG/NE TUBE PLACEMENT   Final Result   Enteric tube tip in the fundus of the stomach. US RETROPERITONEAL COMPLETE   Final Result   No hydronephrosis of the transplant kidney. The bladder is decompressed by a Chin catheter. IR FLUORO GUIDED CVA DEVICE PLMT/REPLACE/REMOVAL   Final Result   Successful placement of a dual lumen power PICC line. The tip of the PICC   line catheter was placed within the distal right subclavian vein. Leti Mcclellan IMPRESSION:   Successful ultrasound and fluoroscopy guided PICC placement         IR ULTRASOUND GUIDANCE VASCULAR ACCESS   Final Result   Successful placement of a dual lumen power PICC line. The tip of the PICC   line catheter was placed within the distal right subclavian vein. Leti Mcclellan IMPRESSION:   Successful ultrasound and fluoroscopy guided PICC placement         CT HEAD WO CONTRAST   Final Result   No acute intracranial abnormality or hemorrhage. Bilateral mastoiditis and right sphenoid sinusitis. XR SHOULDER LEFT (MIN 2 VIEWS)   Final Result   No fracture. Increased separation of the humeral head from the osseous   glenoid and AC joint, which could represent subluxation. Otherwise, a large   synovial effusion, hematoma, or mass cannot be excluded. Clinical   correlation is recommended. CT scan of the left shoulder can be performed   for further evaluation. Cardiomegaly. XR HUMERUS LEFT (MIN 2 VIEWS)   Final Result   No fracture or dislocation. Increased separation of the humeral head from   the osseous glenoid and AC joint, which could represent subluxation. A large   synovial effusion, hematoma, or mass cannot be excluded. CT scan of the left   shoulder can be considered for further evaluation. XR CHEST PORTABLE   Final Result   1. Right perihilar discoid atelectasis.    2. There are no findings of failure or pneumonia. Assessment    -Acute kidney injury : improving     -Hyperchrloremic acidosis :better . Continue nahco3 tablets     -Hx of  donor kidney transplant : continue current immunosuppressent     -Immunosuppression host :  continue current immunosuppressent     -Anaemia of chronic disease : some of the anaemia is dilutional . continue retacrit 5000 u sc q week     -Edema mainly in UE : better . With Na going up hold lasix today .      -Hypo Magensiemia : replace     -severe deconditioning : PT/OT         Electronically signed by Kath Escudero MD on 3/20/2022

## 2022-03-21 LAB
ALBUMIN SERPL-MCNC: 2.4 G/DL (ref 3.5–5.2)
ALP BLD-CCNC: 42 U/L (ref 35–104)
ALT SERPL-CCNC: 29 U/L (ref 0–32)
ANION GAP SERPL CALCULATED.3IONS-SCNC: 10 MMOL/L (ref 7–16)
ANISOCYTOSIS: ABNORMAL
AST SERPL-CCNC: 33 U/L (ref 0–31)
BASOPHILIC STIPPLING: ABNORMAL
BASOPHILS ABSOLUTE: 0 E9/L (ref 0–0.2)
BASOPHILS RELATIVE PERCENT: 0 % (ref 0–2)
BILIRUB SERPL-MCNC: 0.4 MG/DL (ref 0–1.2)
BUN BLDV-MCNC: 27 MG/DL (ref 6–23)
CALCIUM SERPL-MCNC: 9.2 MG/DL (ref 8.6–10.2)
CHLORIDE BLD-SCNC: 101 MMOL/L (ref 98–107)
CO2: 25 MMOL/L (ref 22–29)
CREAT SERPL-MCNC: 1.7 MG/DL (ref 0.5–1)
EOSINOPHILS ABSOLUTE: 0.11 E9/L (ref 0.05–0.5)
EOSINOPHILS RELATIVE PERCENT: 1 % (ref 0–6)
GFR AFRICAN AMERICAN: 36
GFR NON-AFRICAN AMERICAN: 30 ML/MIN/1.73
GLUCOSE BLD-MCNC: 100 MG/DL (ref 74–99)
HCT VFR BLD CALC: 28.8 % (ref 34–48)
HEMOGLOBIN: 8.6 G/DL (ref 11.5–15.5)
HYPOCHROMIA: ABNORMAL
LYMPHOCYTES ABSOLUTE: 0.98 E9/L (ref 1.5–4)
LYMPHOCYTES RELATIVE PERCENT: 9 % (ref 20–42)
MCH RBC QN AUTO: 31 PG (ref 26–35)
MCHC RBC AUTO-ENTMCNC: 29.9 % (ref 32–34.5)
MCV RBC AUTO: 104 FL (ref 80–99.9)
METAMYELOCYTES RELATIVE PERCENT: 2 % (ref 0–1)
MONOCYTES ABSOLUTE: 0.11 E9/L (ref 0.1–0.95)
MONOCYTES RELATIVE PERCENT: 1 % (ref 2–12)
MYELOCYTE PERCENT: 3 % (ref 0–0)
NEUTROPHILS ABSOLUTE: 9.7 E9/L (ref 1.8–7.3)
NEUTROPHILS RELATIVE PERCENT: 84 % (ref 43–80)
OVALOCYTES: ABNORMAL
PDW BLD-RTO: 17.8 FL (ref 11.5–15)
PLATELET # BLD: 182 E9/L (ref 130–450)
PMV BLD AUTO: 12.2 FL (ref 7–12)
POIKILOCYTES: ABNORMAL
POLYCHROMASIA: ABNORMAL
POTASSIUM SERPL-SCNC: 3.8 MMOL/L (ref 3.5–5)
RBC # BLD: 2.77 E12/L (ref 3.5–5.5)
SODIUM BLD-SCNC: 136 MMOL/L (ref 132–146)
TEAR DROP CELLS: ABNORMAL
TOTAL PROTEIN: 4 G/DL (ref 6.4–8.3)
WBC # BLD: 10.9 E9/L (ref 4.5–11.5)

## 2022-03-21 PROCEDURE — 6370000000 HC RX 637 (ALT 250 FOR IP): Performed by: SPECIALIST

## 2022-03-21 PROCEDURE — 97530 THERAPEUTIC ACTIVITIES: CPT

## 2022-03-21 PROCEDURE — 36415 COLL VENOUS BLD VENIPUNCTURE: CPT

## 2022-03-21 PROCEDURE — 51702 INSERT TEMP BLADDER CATH: CPT

## 2022-03-21 PROCEDURE — 80053 COMPREHEN METABOLIC PANEL: CPT

## 2022-03-21 PROCEDURE — 6370000000 HC RX 637 (ALT 250 FOR IP): Performed by: INTERNAL MEDICINE

## 2022-03-21 PROCEDURE — 2580000003 HC RX 258: Performed by: SPECIALIST

## 2022-03-21 PROCEDURE — 1200000000 HC SEMI PRIVATE

## 2022-03-21 PROCEDURE — 6360000002 HC RX W HCPCS: Performed by: SPECIALIST

## 2022-03-21 PROCEDURE — 6360000002 HC RX W HCPCS: Performed by: INTERNAL MEDICINE

## 2022-03-21 PROCEDURE — 85025 COMPLETE CBC W/AUTO DIFF WBC: CPT

## 2022-03-21 PROCEDURE — 92526 ORAL FUNCTION THERAPY: CPT | Performed by: SPEECH-LANGUAGE PATHOLOGIST

## 2022-03-21 PROCEDURE — 2580000003 HC RX 258: Performed by: INTERNAL MEDICINE

## 2022-03-21 PROCEDURE — 97110 THERAPEUTIC EXERCISES: CPT

## 2022-03-21 PROCEDURE — 94640 AIRWAY INHALATION TREATMENT: CPT

## 2022-03-21 PROCEDURE — 97535 SELF CARE MNGMENT TRAINING: CPT

## 2022-03-21 RX ORDER — MAGNESIUM OXIDE 400 MG/1
400 TABLET ORAL DAILY
Qty: 30 TABLET | Refills: 1 | Status: SHIPPED | OUTPATIENT
Start: 2022-03-21 | End: 2022-09-15

## 2022-03-21 RX ORDER — SIMETHICONE 80 MG
160 TABLET,CHEWABLE ORAL 3 TIMES DAILY PRN
Status: DISCONTINUED | OUTPATIENT
Start: 2022-03-21 | End: 2022-03-23 | Stop reason: HOSPADM

## 2022-03-21 RX ADMIN — APIXABAN 5 MG: 5 TABLET, FILM COATED ORAL at 09:00

## 2022-03-21 RX ADMIN — LEVOTHYROXINE SODIUM 50 MCG: 0.05 TABLET ORAL at 05:31

## 2022-03-21 RX ADMIN — IPRATROPIUM BROMIDE AND ALBUTEROL SULFATE 1 AMPULE: .5; 2.5 SOLUTION RESPIRATORY (INHALATION) at 06:14

## 2022-03-21 RX ADMIN — CLOZAPINE 50 MG: 25 TABLET ORAL at 20:03

## 2022-03-21 RX ADMIN — MYCOPHENOLATE MOFETIL 500 MG: 250 CAPSULE ORAL at 09:00

## 2022-03-21 RX ADMIN — ALLOPURINOL 100 MG: 100 TABLET ORAL at 09:00

## 2022-03-21 RX ADMIN — ATORVASTATIN CALCIUM 10 MG: 10 TABLET, FILM COATED ORAL at 20:04

## 2022-03-21 RX ADMIN — PREDNISONE 5 MG: 5 TABLET ORAL at 09:00

## 2022-03-21 RX ADMIN — MAGNESIUM OXIDE 400 MG: 400 TABLET ORAL at 09:00

## 2022-03-21 RX ADMIN — VENLAFAXINE 37.5 MG: 37.5 TABLET ORAL at 09:00

## 2022-03-21 RX ADMIN — CYCLOSPORINE 100 MG: 25 CAPSULE, LIQUID FILLED ORAL at 20:04

## 2022-03-21 RX ADMIN — Medication 10 ML: at 09:01

## 2022-03-21 RX ADMIN — DOCUSATE SODIUM 100 MG: 100 CAPSULE, LIQUID FILLED ORAL at 09:00

## 2022-03-21 RX ADMIN — APIXABAN 5 MG: 5 TABLET, FILM COATED ORAL at 20:04

## 2022-03-21 RX ADMIN — SIMETHICONE 160 MG: 80 TABLET, CHEWABLE ORAL at 18:58

## 2022-03-21 RX ADMIN — FIDAXOMICIN 200 MG: 200 TABLET, FILM COATED ORAL at 20:03

## 2022-03-21 RX ADMIN — VENLAFAXINE 37.5 MG: 37.5 TABLET ORAL at 16:37

## 2022-03-21 RX ADMIN — FIDAXOMICIN 200 MG: 200 TABLET, FILM COATED ORAL at 09:01

## 2022-03-21 RX ADMIN — LEVOFLOXACIN 750 MG: 5 INJECTION, SOLUTION INTRAVENOUS at 16:39

## 2022-03-21 RX ADMIN — PANTOPRAZOLE SODIUM 40 MG: 40 TABLET, DELAYED RELEASE ORAL at 05:31

## 2022-03-21 RX ADMIN — HEPARIN 100 UNITS: 100 SYRINGE at 12:33

## 2022-03-21 RX ADMIN — DOCUSATE SODIUM 100 MG: 100 CAPSULE, LIQUID FILLED ORAL at 21:00

## 2022-03-21 RX ADMIN — IPRATROPIUM BROMIDE AND ALBUTEROL SULFATE 1 AMPULE: .5; 2.5 SOLUTION RESPIRATORY (INHALATION) at 10:00

## 2022-03-21 RX ADMIN — MYCOPHENOLATE MOFETIL 500 MG: 250 CAPSULE ORAL at 20:04

## 2022-03-21 RX ADMIN — SODIUM BICARBONATE 650 MG: 648 TABLET ORAL at 09:01

## 2022-03-21 RX ADMIN — Medication 10 ML: at 20:03

## 2022-03-21 RX ADMIN — ACYCLOVIR SODIUM 350 MG: 1000 INJECTION, SOLUTION INTRAVENOUS at 21:28

## 2022-03-21 RX ADMIN — HEPARIN 100 UNITS: 100 SYRINGE at 20:03

## 2022-03-21 RX ADMIN — CYCLOSPORINE 100 MG: 25 CAPSULE, LIQUID FILLED ORAL at 09:00

## 2022-03-21 RX ADMIN — IPRATROPIUM BROMIDE AND ALBUTEROL SULFATE 1 AMPULE: .5; 2.5 SOLUTION RESPIRATORY (INHALATION) at 14:07

## 2022-03-21 RX ADMIN — FOLIC ACID 1 MG: 1 TABLET ORAL at 09:01

## 2022-03-21 RX ADMIN — IPRATROPIUM BROMIDE AND ALBUTEROL SULFATE 1 AMPULE: .5; 2.5 SOLUTION RESPIRATORY (INHALATION) at 21:37

## 2022-03-21 ASSESSMENT — PAIN SCALES - GENERAL
PAINLEVEL_OUTOF10: 0

## 2022-03-21 ASSESSMENT — PAIN SCALES - WONG BAKER: WONGBAKER_NUMERICALRESPONSE: 0

## 2022-03-21 NOTE — PROGRESS NOTES
Physical Therapy Treatment Note/Plan of Care    Room #:  6878/5844-88  Patient Name: Mary Baron  YOB: 1955  MRN: [id number]    Date of Service: 3/21/2022     Tentative placement recommendation: Subacute rehab  Equipment recommendation:  To be determined      Evaluating Physical Therapist: Christie Stewart, PT #[id number]      Specific Provider Orders/Date/Referring Provider :  03/09/22 2130   PT eval and treat Start: 03/09/22 2130, End: 03/09/22 2130, ONE TIME, Standing Count: 1 Occurrences, Sergo Hoang MD      Admitting Diagnosis:   Confusion [R41.0]  Acute respiratory failure with hypoxia ([address] [J96.01]  Acute respiratory failure with hypoxemia ([address] [J96.01]     altered mental status    Surgery: none  Visit Diagnoses       Codes    Confusion     R41.0    Encounter for imaging study to confirm nasogastric (NG) tube placement     Z01.89          Patient Active Problem List   Diagnosis    Peripheral vascular disease ([address]    Depression    Clotting disorder ([address]    Abnormal EKG    Cancer ([address]    Over weight    Kidney transplanted    ESRD (end stage renal disease) ([address]    Non morbid obesity due to excess calories    Hypertension    Leg swelling    Lymphedema of both lower extremities    Acute gout involving toe of right foot    Sepsis secondary to UTI ([address]    Acute kidney injury superimposed on CKD ([address]    Thyroid disease    Acute on chronic combined systolic (congestive) and diastolic (congestive) heart failure ([address]    Sepsis ([address]    Acute renal failure ([address]    CECILY (acute kidney injury) ([address]    Ischemic bowel disease ([address]    Altered mental status    Coma ([address]    Acute respiratory failure with hypoxia ([address]    Hypoxia    Acute metabolic encephalopathy    Acute deep vein thrombosis (DVT) of femoral vein of left lower extremity (HCC)    Anemia    Positive blood culture    Difficult intravenous access    Elevated troponin    Paroxysmal atrial fibrillation (HCC)    Hypotension    LBBB (left bundle branch block)    History of DVT (deep vein thrombosis)    Chronic anticoagulation    Acute respiratory failure with hypoxemia (HCC)        ASSESSMENT of Current Deficits Patient exhibits decreased strength, balance, endurance, range of motion and pain left upper extremity  impairing functional mobility, transfers, tolerance to activity and participation Patient is maximal assist x2 to sit EOB with maximal assist x1 for sitting balance due to posterior lean. Patient requires continued skilled physical therapy to address concerns listed above for increased safety and function at discharge. PHYSICAL THERAPY  PLAN OF CARE       Physical therapy plan of care is established based on physician order,  patient diagnosis and clinical assessment    Current Treatment Recommendations:    -Bed Mobility: Lower extremity exercises , Upper extremity exercises  and Trunk control activities   -Sitting Balance: Hands on support to maintain midline , Facilitate active trunk muscle engagement  and Facilitate postural control in all planes   -Endurance: Utilize Supervised activities to increase level of endurance to allow for safe functional mobility including transfers and gait     PT long term treatment goals are located in below grid    Patient and or family understand(s) diagnosis, prognosis, and plan of care. Frequency of treatments: Patient will be seen  3-5 times/week.          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    Cerebrovascular disease     Clotting disorder ([facility] 1985 thrombophlebitis after c section delivery    Depression     15 years followed by dr Adrian Howard Hemod[facility] patient [facility])     History of blood transfusion     Hyperlipidemia     Hypertension     Hypothyroidism     Leg swelling 09/03/2020    Lymphedema of both lower extremities 09/03/2020    Peripheral vascular disease ([address]     Renal failure 11/2012    renal transplant    Thrombophlebitis     after c section delivery    Thyroid disease      Past Surgical History:   Procedure Laterality Date   [address]    one normal delivery    COLECTOMY N/A 1/15/2022    DIAGNOSTIC  LAPAROSCOPY LYSIS OF ADHESIONS performed by Manuelito Magaña MD at [address] [facility] [address] CATH LAB PROCEDURE  2006    normal coronaries and 1996 normal coronaries    DIALYSIS FISTULA CREATION  march and july 2012    phase one and two patient will start dialysis when needed   [address]    transfemoral venous bypass grafts    IR IVC FILTER PLACEMENT W IMAGING  2/26/2022    IR IVC FILTER PLACEMENT W IMAGING 2/26/2022  SPECIAL PROCEDURES    KIDNEY TRANSPLANT  2016    KIDNEY TRANSPLANT  2015    KIDNEY TRANSPLANT  2015    PARATHYROIDECTOMY  2006    left parathyroid  implant and parathyroidectomy    TRANSESOPHAGEAL ECHOCARDIOGRAM N/A 2/28/2022    TRANSESOPHAGEAL ECHOCARDIOGRAM WITH BUBBLE STUDY performed by Rico Moya MD at [address]:    Precautions: Use lift equipment for lifting patient , falls and alarm ,  hx kidney transplant 2015, contact iso-c.diff, ng tube    Social history: Patient from  in a skilled nursing facility  Prior to multiple hospitalizations lived alone in a apartment with Elevator to enter   Tub shower grab bars    Equipment owned: Rollator and Shower chair,       [address] [facility]   How much difficulty turning over in bed?: A Lot  How much difficulty sitting down on / standing up from a chair with arms?: Unable  How much difficulty moving from lying on back to sitting on side of bed?: A Lot  How much help from another person moving to and from a bed to a chair?: Total  How much help from another person needed to walk in hospital room?: Total  How much help from another person for climbing 3-5 steps with a railing?: Total  AM-PAC Inpatient Mobility Raw Score : 8  AM-PAC Inpatient T-Scale Score : 28.52  Mobility Inpatient CMS 0-100% Score: 86.62  Mobility Inpatient CMS G-Code Modifier : CM    Nursing cleared patient for PT treatment. .   OBJECTIVE;   Initial Evaluation  Date: 3/10/2022 Treatment Date:  3/21/2022     Short Term/ Long Term   Goals   Was pt agreeable to Eval/treatment? Yes Yes To be met in 3 days   Pain level   5/10  Left upper extremity  No number assigned    Bed Mobility    Rolling: Dependent assist of 1    Supine to sit: Dependent of  2    Sit to supine: Dependent of  2    Scooting: Dependent of  2   Rolling: Maximal assist of  2   Supine to sit: Maximal assist of  2   Sit to supine: Maximal assist of  2   Scooting: Maximal assist of  2    Rolling: Moderate assist of  2    Supine to sit: Moderate assist of  2    Sit to supine: Moderate assist of  2    Scooting: Moderate assist of  2     Transfers Sit to stand: Not assessed    Sit to stand: Not assessed  d/t to pt overall debility, decreased activity tolerance, balance deficits, safety and fall risk.        Sit to stand: Maximal assist of  2     ROM Limited bilateral upper extremities and lower extremities    Increase range of motion 10% of affected joints    Strength BUE:  refer to OT eval  RLE:  2+/5  LLE:  2+/5  Increase strength in affected mm groups by 1/3 grade   Balance Sitting EOB:  poor  Max progressing to mod assist   Dynamic Standing:  not assessed   Sitting EOB: poor, max assist d/t posterior lean   Dynamic Standing: not assessed    Sitting EOB:  fair    Dynamic Standing: fair -     Patient is Alert & Oriented x person and place and follows one step directions    Sensation:  Patient  denies numbness/tingling   Edema:  yes bilateral lower extremities and bilateral upper extremities   Endurance: fair       Vitals: room air   Blood Pressure at rest  Blood Pressure during session    Heart Rate at r est   Heart Rate during session     SPO2 at rest 95%  SPO2 during session 93 %     Patient education  Patient educated on role of Physical Therapy, risks of immobility, safety and plan of care,  importance of mobility while in hospital , ankle pumps, quad set and glut set for edema control, blood clot prevention and positioning for skin integrity and comfort     Patient response to education:   Pt verbalized understanding Pt demonstrated skill Pt requires further education in this area   Yes Partial Yes      Treatment:  Patient practiced and was instructed/facilitated in the following treatment: Patient assisted to EOB. Sat EOB for 8 minutes with MaxA for support d/t posterior lean for dynamic balance and increasing activity tolerance. Patient assisted back to supine. Rolled x2 reps for replacing TAPS system. Assisted to [facility], max assist x2. AAROM/PROM exercises to all joints. Pt positioned for skin integrity and edema control. Therapeutic Exercises: see above    At end of session, patient in bed with alarm call light and phone within reach,  all lines and tubes intact, nursing notified. Patient would benefit from continued skilled Physical Therapy to improve functional independence and quality of life.          Patient's/ family goals   Return to [facility]    Time in  0815  Time out 0842    Total Treatment Time 27 minutes    CPT codes:  Therapeutic activities (85566)   15 minutes  1 unit(s)  Therapeutic exercises (30806)   12 minutes  1 unit(s)     Dayna Patel #[id number]

## 2022-03-21 NOTE — PROGRESS NOTES
OCCUPATIONAL THERAPY TREATMENT NOTE     [facility]  Abdiaziz [place]. [place]         Date:3/21/2022  Patient Name: Sneha Newman  MRN: [id number]  : 1955  Room: [address] [place] [address]            Evaluating OT: Kaela Rivera OTR/L; [id number]      Referring Provider and Specific Provider Orders/Date:      22   OT eval and treat  Start:  22,   End:  22, Taurus Watters,   Juliana Count:  1 Occurrences,   R         eJff Cleveland MD      Placement Recommendation: Subacute          Diagnosis:   1. Acute respiratory failure with hypoxia ([address]    2.  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    Cerebrovascular disease      Clotting disorder ([address] 1985     thrombophlebitis after c section delivery    Depression       15 years followed by dr Brennon Solis Hemodialysis patient [facility])      History of blood transfusion      Hyperlipidemia      Hypertension      Hypothyroidism      Leg swelling 2020    Lymphedema of both lower extremities 2020    Peripheral vascular disease ([address]      Renal failure 2012     renal transplant    Thrombophlebitis       after c section delivery    Thyroid disease              Past Surgical History         Past Surgical History:   Procedure Laterality Date     SECTION   1985     one normal delivery    COLECTOMY N/A 1/15/2022     DIAGNOSTIC  LAPAROSCOPY LYSIS OF ADHESIONS performed by Dimple Lomas MD at [address] [facility] CATH LAB PROCEDURE        normal coronaries and 1996 normal coronaries    DIALYSIS FISTULA CREATION   march and july 2012     phase one and two patient will start dialysis when needed   [place]     transfemoral venous bypass grafts    IR IVC FILTER PLACEMENT W IMAGING   2/26/2022     IR IVC FILTER PLACEMENT W IMAGING 2/26/2022 [facility]ED[facility]    KIDNEY TRANSPLANT   2016    KIDNEY TRANSPLANT   2015    KIDNEY TRANSPLANT   2015    PARATHYROIDECTOMY   2006     left parathyroid  implant and parathyroidectomy    TRANSESOPHAGEAL ECHOCARDIOGRAM N/A 2/28/2022     TRANSESOPHAGEAL ECHOCARDIOGRAM WITH BUBBLE STUDY performed by Ignacio Nation MD at [facility] ENDOSCOPY         Precautions:  Fall Risk, L UE edema, non-ambulatory      Assessment of current deficits:     [x]? Functional mobility            [x]?ADLs           [x]? Strength                   []?Cognition    [x]? Functional transfers          [x]? IADLs         []? Safety Awareness   [x]? Endurance    [x]? Fine Coordination              [x]? Balance      []? Vision/perception    []? Sensation      [x]? Gross Motor Coordination  [x]? ROM           []? Delirium                   [x]?  Motor Control      OT PLAN OF CARE   OT POC based on physician orders, patient diagnosis and results of clinical assessment     Frequency/Duration 1-3 days/wk for 2 weeks PRN      Specific OT Treatment Interventions to include:   * Instruction/training on adapted ADL techniques and AE recommendations to increase functional independence within precautions       * Training on energy conservation strategies, correct breathing pattern and techniques to improve independence/tolerance for self-care routine  * Functional transfer/mobility training/DME recommendations for increased independence, safety, and fall prevention  * Patient/Family education to increase follow through with safety techniques and functional independence  * Recommendation of environmental modifications for increased safety with functional transfers/mobility and ADLs  * Splinting/positioning for increased function, prevention of contractures, and improve skin integrity  * Therapeutic exercise to improve motor endurance, ROM, and functional strength for ADLs/functional transfers  * Therapeutic activities to facilitate/challenge dynamic balance, stand tolerance for increased safety and independence with ADLs  * Positioning to improve skin integrity, interaction with environment and functional independence     Recommended Adaptive Equipment: none       Home Living: pt came to us from [facility] and [place] [id number] owned: SNF equipment      Prior Level of Function: Dependnet with ADLs , Dependent with IADLs; non-ambulatory     Driving: no  Occupation: not working     Pain Level: pain in L UE, pt could not rate level of pain; Nursing notified.        Cognition: A&O: 4/4; Follows 3 step directions; pt demo'd increased alertness this date               Memory: fair               Sequencing: fair               Problem solving: fair               Judgement/safety: fair      [facility]   AM-PAC Daily Activity Inpatient   How much help for putting on and taking off regular lower body clothing?: Total  How much help for Bathing?: A Lot  How much help for Toileting?: Total  How much help for putting on and taking off regular upper body clothing?: A Lot  How much help for taking care of personal grooming?: A Lot  How much help for eating meals?: A Lot  AM-[facility] Inpatient Daily Activity Raw Score: 10  AM-PAC Inpatient ADL T-Scale Score : 27.31  ADL Inpatient CMS 0-100% Score: 74.7  ADL Inpatient CMS G-Code Modifier : CL                Functional Assessment:     Initial Eval Status  Date: 3/10/22 Treatment Status  Date: 3/21/22 STGs = LTGs  Time frame: 10-14 days   Feeding Maximal Assist   MIN A pt requiring assist with opening containers, cutting food  Minimal Assist    Grooming Dependent to brush hair and work ofn removing large knot from back of hair  MAX A  Long sitting in bed, pt requiring assist to wash hair with shampoo cap, wash face, comb/brush hair  Minimal Assist    UB Dressing Maximal assist  MAX A dayana/doff hospital gown simulated at bed level:       Minimal Assist    LB Dressing Dependent  Dep to dayana/doff socks  N.A     Bathing Maximal Assist MAX A simulated at bed level  Moderate Assist   Toileting Dependent for hygiene as pt was incontinent of bowel  n/t Moderate Assist    Bed Mobility  Supine to sit: Dependent x 2    Sit to supine: Dependent x 2   N/t MAX A x2 per last report   Supine to sit: Minimal Assist x 2    Sit to supine: Minimal Assist x 2     Balance Sitting:     Static: poor at EOB with maximal assist progressing to minimal assist     Dynamic: poor   Standing: N/T  Sitting:   Static: MAX A   Dynamic: MAX A   Standing: n/t      Activity Tolerance Poor   poor  fair    Visual/  Perceptual Glasses: no                       Pt long sitting in bed engaged in B UE AAROM 2 x 10  reps in all planes fist pumps, elbow flexion/extension, shoulder flexion/extension focusing on edema management, UE strength/endurance to increase independence with ADL tasks. Comments: Upon arrival pt supine in bed, agreeable to therapy session. Pt educated with regards to bed mobility, B UE AAROM, edema management,  hand placement, importance of increased activity, bathing techniques/participation, UE dressing, grooming tasks. At end of session pt supine in bed, with HOB elevated past 30* for tube feed,  all lines and tubes intact, call light within reach. · Pt has made fair- progress towards set goals.    · Continue with current plan of care      Treatment Time In: 1173            Treatment Time XRP:3430              Treatment Charges: Mins Units   Ther Ex  [id number]  13 1   Manual Therapy Haydee Choi [phone]       ADL/Home Mgt [id number] 25 1   Neuro Re-ed [id number]     Group Therapy      Orthotic manage/training  [id number]     Non-Billable Time     Total Timed Treatment  38 3 Aren Hdez YANCEY/L [id number]

## 2022-03-21 NOTE — PROGRESS NOTES
Dr. Brandi Mcghee MD,    Your patient is on a medication that requires a renal dose adjustment. Renal Function Assessment:    Date Body Weight IBW  Adjusted BW SCr  CrCl Dialysis status   3/21/2022 226 lb (102.5 kg)  Ideal body weight: 50.1 kg (110 lb 7.2 oz)  Adjusted ideal body weight: 71.1 kg (156 lb 10.7 oz) Serum creatinine: 1.7 mg/dL (H) 03/21/22 0537  Estimated creatinine clearance: 37 mL/min (A) N/a       Pharmacy has renally dose-adjusted the following medication(s):    Date Original Order Renally Adjusted Order   3/21/2022 Acyclovir 350mg q8h Acyclovir 350 mg q12h       These changes were made per protocol according to the [facility] Renal Function-Based Dose Adjustments Policy    *Please note this dose may need readjusted if your patient's renal function significantly improves. Please contact pharmacy with any questions regarding these changes.     Erickson Ramirez [facility]  3/21/2022  2:46 PM

## 2022-03-21 NOTE — PLAN OF CARE
Problem: Falls - Risk of:  Goal: Will remain free from falls  Description: Will remain free from falls  Outcome: Met This Shift  Goal: Absence of physical injury  Description: Absence of physical injury  Outcome: Met This Shift     Problem: Skin Integrity:  Goal: Will show no infection signs and symptoms  Description: Will show no infection signs and symptoms  Outcome: Met This Shift  Goal: Absence of new skin breakdown  Description: Absence of new skin breakdown  Outcome: Met This Shift     Problem: Coping:  Goal: Ability to remain calm will improve  Description: Ability to remain calm will improve  Outcome: Met This Shift     Problem: Safety:  Goal: Ability to remain free from injury will improve  Description: Ability to remain free from injury will improve  Outcome: Met This Shift     Problem: Self-Care:  Goal: Ability to participate in self-care as condition permits will improve  Description: Ability to participate in self-care as condition permits will improve  Outcome: Met This Shift     Problem: Mental Status - Impaired:  Goal: Mental status will improve  Description: Mental status will improve  Outcome: Met This Shift     Problem: OXYGENATION/RESPIRATORY FUNCTION  Goal: Patient will maintain patent airway  Outcome: Met This Shift  Goal: Patient will achieve/maintain normal respiratory rate/effort  Description: Respiratory rate and effort will be within normal limits for the patient  Outcome: Met This Shift     Problem: HEMODYNAMIC STATUS  Goal: Patient has stable vital signs and fluid balance  Outcome: Met This Shift     Problem: Infection - Central Venous Catheter-Associated Bloodstream Infection:  Goal: Will show no infection signs and symptoms  Description: Will show no infection signs and symptoms  Outcome: Met This Shift     Problem:  Bowel/Gastric:  Goal: Occurrences of diarrhea will decrease  Description: Occurrences of diarrhea will decrease  Outcome: Met This Shift     Problem: Fluid Volume:  Goal: Will show no signs and symptoms of electrolyte imbalance  Description: Will show no signs and symptoms of electrolyte imbalance  Outcome: Met This Shift     Problem: Physical Regulation:  Goal: Prevent transmision of infection  Description: Prevent transmision of infection  Outcome: Met This Shift  Goal: Ability to avoid or minimize complications of infection will improve  Description: Ability to avoid or minimize complications of infection will improve  Outcome: Met This Shift  Goal: Signs and symptoms of infection will decrease  Description: Signs and symptoms of infection will decrease  Outcome: Met This Shift Retention Suture Text: Retention sutures were placed to support the closure and prevent dehiscence.

## 2022-03-21 NOTE — PROGRESS NOTES
[facility] MD Liane Hopkins MD Joie Oman, MD Yvonne Curl MD  Progress Note    Patient's Name: Benedicto Flaherty  10:13 AM  3/21/2022    Subjective  3/13 : seen in her room  Weak decondioned lying flat in bed , poor po intake , clinically euvolemic   3/14: NG tube not functioning, just replaced. No meds all day as a result. IV fluids been stopped for few hours, as well. 3/15 : seen today , o2/nc . Appear comfortable . TF going at goal . Per RN pt is more alert and awake   UE edema B/L 2+ .   3/17 : clinically better wake , talktive ,edema in UE better on TF     3/18 : on RA , alert awake ,oriented . No LE edema . Appear comfortable   3/19: Patient doing rather well, off of tube feeding patient started getting puréed food discussed the case with the son was at the bedside overall condition rather optimistic improving.  3/20: Patient was seen and evaluated in her room, no new problems she was having lunch without any complications of gag or other shortness of breath.    3/21 : seen in her room on RA , alert oriented , pleasant , continue to look though profoundly weak with poor muscle mass     History of Present Ilness:      Patient has h/o preserved left ventricular systolic function (CLEMENCIA  3/27/4931), stress test with no significant reversibility 6/16/2015, paroxysmal atrial fibrillation (on Eliquis), chronic left bundle branch block, HTN, HLD,  s/p kidney transplant,CKD, H/o DVT, s/p IVC on AC now, PVD, s/p thyroidectomy for cancer, gout, obesity     She was recently treated in hospitalfrom 2/25/2020 to 3/7/2022 because of acute hypoxic respiratory failure likely due to aspiration pneumonia with positive blood culture and acute metabolic encephalopathy     Patient was brought from [address] [place] [address] and rehab facility to ED of [address] [place] [address] on 3/9/22 due to new mental status changes.     Nephrology asked to see for CECILY /CKD   Pt has hx of cadaveric kidney transplant 7 years back   Her baseline cr has been 1.2-1.6 with a lot of fluctuation in contest of dehydration . Pt seen in her room , she is very weak and deconditoned to my eyes she has lost a lot of weight .    She has a hassan in place   She has poor po intake   She is euvolemic to mildly hypovolemic       Allergies:  Macrodantin [nitrofurantoin macrocrystal] and Sulfa antibiotics    Current Medications:    cycloSPORINE modified (NEORAL) capsule 100 mg, BID  docusate sodium (COLACE) capsule 100 mg, BID  mycophenolate (CELLCEPT) capsule 500 mg, BID  acyclovir (ZOVIRAX) 350 mg in dextrose 5 % 50 mL IVPB, Q8H  sodium bicarbonate tablet 650 mg, Daily  0.9 % sodium chloride infusion, PRN  venlafaxine (EFFEXOR) tablet 37.5 mg, BID WC  epoetin casey-epbx (RETACRIT) injection 2,000 Units, Q7 Days   And  epoetin casey-epbx (RETACRIT) injection 3,000 Units, Q7 Days  magnesium oxide (MAG-OX) tablet 400 mg, Daily  potassium chloride (KLOR-CON M) extended release tablet 20 mEq, Once  ipratropium-albuterol (DUONEB) nebulizer solution 1 ampule, Q4H  midodrine (PROAMATINE) tablet 5 mg, BID PRN  lidocaine 1 % injection 5 mL, Once  sodium chloride flush 0.9 % injection 5-40 mL, 2 times per day  sodium chloride flush 0.9 % injection 5-40 mL, PRN  0.9 % sodium chloride infusion, PRN  heparin flush 100 UNIT/ML injection 100 Units, 2 times per day  heparin flush 100 UNIT/ML injection 100 Units, PRN  [Held by provider] metoprolol tartrate (LOPRESSOR) tablet 12.5 mg, BID  Fidaxomicin (DIFICID) tablet 200 mg, BID  levoFLOXacin (LEVAQUIN) 750 MG/150ML infusion 750 mg, Q48H  apixaban (ELIQUIS) tablet 5 mg, BID  ondansetron (ZOFRAN-ODT) disintegrating tablet 4 mg, Q8H PRN  atorvastatin (LIPITOR) tablet 10 mg, Nightly  cloZAPine (CLOZARIL) tablet 50 mg, Nightly  levothyroxine (SYNTHROID) tablet 50 mcg, Daily  pantoprazole (PROTONIX) tablet 40 mg, QAM AC  predniSONE (DELTASONE) tablet 5 mg, Daily  allopurinol (ZYLOPRIM) tablet 961 mg, Daily  folic acid (FOLVITE) tablet 1 mg, Daily        Review of Systems:   Constitutional: weakness   Eyes: no eye pain , no itching , no drainage  Ears, nose, mouth, throat, and face: no ear ,nose pain , hearing is ok ,no nasal drainage   Respiratory: no sob ,no cough ,no wheezing . Cardiovascular: no chest pain , no palpitation ,no sob . Gastrointestinal: no nausea, vomiting , constipation , no abdominal pain . Genitourinary:no urinary retention , no burning , dysuria . No polyuria   Hematologic/lymphatic: no bleeding , no cougulation issues . Musculoskeletal:no joint pain , no swelling . Neurological: no headaches ,no weakness , no numbness . Endocrine: no thirst , no weight issues . Physical exam:   Vital signs BP (!) 96/51   Pulse 95   Temp 98 °F (36.7 °C) (Infrared)   Resp 16   Ht 5' 2\" (1.575 m)   Wt 226 lb (102.5 kg)   SpO2 95%   BMI 41.34 kg/m²   Gen : In NAD , appears stated age . Head : NC/AT, EOMI, sclera and conjunctive are clear and anicteric. Mucous membranes dry  Neck : supple , no thyromegaly noted . Trachea midline  CV : regular rhythm, normal S1 and S2, No M/R/G . Lungs: CTAB , no wheezing , good air entry in all fields, though poor inspiratory effort  Abd : soft , NT , BS +   Skin : soft, dry . Neuro : CN  II-XII grossly intact , no focal neurologic deficit .    Psych : affect flat, minimally responsive, mostly to tactile stimulation, does not answer questions or follow commands    Data:   Labs:  CBC with Differential:    Lab Results   Component Value Date    WBC 10.9 03/21/2022    RBC 2.77 03/21/2022    HGB 8.6 03/21/2022    HCT 28.8 03/21/2022     03/21/2022    .0 03/21/2022    MCH 31.0 03/21/2022    MCHC 29.9 03/21/2022    RDW 17.8 03/21/2022    NRBC 1.0 03/18/2022    METASPCT 2.0 03/21/2022    LYMPHOPCT 9.0 03/21/2022    MONOPCT 1.0 03/21/2022    MYELOPCT 3.0 03/21/2022    BASOPCT 0.0 03/21/2022    MONOSABS 0.11 03/21/2022    LYMPHSABS 0.98 03/21/2022    EOSABS 0.11 03/21/2022    BASOSABS 0.00 03/21/2022     CMP:    Lab Results   Component Value Date     03/21/2022    K 3.8 03/21/2022    K 4.7 03/09/2022     03/21/2022    CO2 25 03/21/2022    BUN 27 03/21/2022    CREATININE 1.7 03/21/2022    GFRAA 36 03/21/2022    LABGLOM 30 03/21/2022    GLUCOSE 100 03/21/2022    PROT 4.0 03/21/2022    LABALBU 2.4 03/21/2022    CALCIUM 9.2 03/21/2022    BILITOT 0.4 03/21/2022    ALKPHOS 42 03/21/2022    AST 33 03/21/2022    ALT 29 03/21/2022     Ionized Calcium:  No results found for: IONCA  Magnesium:    Lab Results   Component Value Date    MG 1.7 03/16/2022     Phosphorus:    Lab Results   Component Value Date    PHOS 3.0 03/16/2022     U/A:    Lab Results   Component Value Date    COLORU Yellow 03/09/2022    PHUR 5.0 03/09/2022    WBCUA 1-3 03/09/2022    RBCUA NONE 03/09/2022    RBCUA 10-20 03/12/2014    YEAST Present 04/09/2021    BACTERIA MODERATE 03/09/2022    CLARITYU Clear 03/09/2022    SPECGRAV 1.025 03/09/2022    LEUKOCYTESUR Negative 03/09/2022    UROBILINOGEN 0.2 03/09/2022    BILIRUBINUR Negative 03/09/2022    BLOODU Negative 03/09/2022    GLUCOSEU Negative 03/09/2022     Microalbumen/Creatinine ratio:  No components found for: RUCREAT  Iron Saturation:  No components found for: PERCENTFE  TIBC:    Lab Results   Component Value Date    TIBC 92 03/17/2022     FERRITIN:    Lab Results   Component Value Date    FERRITIN 745 03/17/2022        Imaging:  XR CHEST ABDOMEN NG PLACEMENT   Final Result   The enteric tube terminates in the stomach. XR CHEST PORTABLE   Final Result   Increased opacities in left lung base related to increasing subsegmental   atelectasis or pleural effusion. XR ABDOMEN FOR NG/OG/NE TUBE PLACEMENT   Final Result   Satisfactory position of the enteric tube. XR CHEST PORTABLE   Final Result   Addendum 1 of 1   ADDENDUM:   The floor reports slow nasogastric tube output.   Based on positioning on   plain film, recommend further advancement. Final   1. Possible right pleural effusion and right basilar atelectasis. 2. Cardiomegaly without pulmonary venous hypertension. 3. Possible hiatal hernia. XR ABDOMEN FOR NG/OG/NE TUBE PLACEMENT   Final Result   Enteric tube tip in the fundus of the stomach. US RETROPERITONEAL COMPLETE   Final Result   No hydronephrosis of the transplant kidney. The bladder is decompressed by a Chin catheter. IR FLUORO GUIDED CVA DEVICE PLMT/REPLACE/REMOVAL   Final Result   Successful placement of a dual lumen power PICC line. The tip of the PICC   line catheter was placed within the distal right subclavian vein. Rachell  IMPRESSION:   Successful ultrasound and fluoroscopy guided PICC placement         IR ULTRASOUND GUIDANCE VASCULAR ACCESS   Final Result   Successful placement of a dual lumen power PICC line. The tip of the PICC   line catheter was placed within the distal right subclavian vein. Rachell  IMPRESSION:   Successful ultrasound and fluoroscopy guided PICC placement         CT HEAD WO CONTRAST   Final Result   No acute intracranial abnormality or hemorrhage. Bilateral mastoiditis and right sphenoid sinusitis. XR SHOULDER LEFT (MIN 2 VIEWS)   Final Result   No fracture. Increased separation of the humeral head from the osseous   glenoid and AC joint, which could represent subluxation. Otherwise, a large   synovial effusion, hematoma, or mass cannot be excluded. Clinical   correlation is recommended. CT scan of the left shoulder can be performed   for further evaluation. Cardiomegaly. XR HUMERUS LEFT (MIN 2 VIEWS)   Final Result   No fracture or dislocation. Increased separation of the humeral head from   the osseous glenoid and AC joint, which could represent subluxation. A large   synovial effusion, hematoma, or mass cannot be excluded. CT scan of the left   shoulder can be considered for further evaluation.          XR CHEST PORTABLE   Final Result   1. Right perihilar discoid atelectasis. 2. There are no findings of failure or pneumonia. Assessment    -Acute kidney injury : cr today is 1.7 . Would encourage po intake , if cr continue to creep up then need to restart IV fluids    -Chronic kidney disease with hx of DDKT : baseline cr 1.1-1.5 .     -Hyperchrloremic acidosis :better . Continue nahco3 tablets     -Hx of  donor kidney transplant : continue current immunosuppressent     -Immunosuppression host :  continue current immunosuppressent     -Anaemia of chronic disease : some of the anaemia is dilutional . continue retacrit 5000 u sc q week     -Edema mainly in UE : better . With Na going up hold lasix today .      -Hypo Magensiemia : replace     -severe deconditioning : PT/OT         Electronically signed by Han Hawley MD on 3/21/2022

## 2022-03-21 NOTE — PROGRESS NOTES
[facility]     [address] [place] [address] 80  [place], [address]  Phone [phone]   [phone]      Admit Date: 3/9/2022 10:02 AM  Pt Name: Ortega Grijalva  MRN: [id number]  : 1955  Reason for Consult:    Chief Complaint   Patient presents with    Altered Mental Status     from NH     Requesting Physician:  Luiz Macedo MD  PCP: Charity Valenzuela MD  History Obtained From:  patient, chart   ID consulted for Confusion [R41.0]  Acute respiratory failure with hypoxia ([address] [J96.01]  Acute respiratory failure with hypoxemia ([address] [J96.01]  on hospital day 10   Face to face encounter   [facility]       Chief Complaint   Patient presents with    Altered Mental Status     from NH     HISTORYOF PRESENT ILLNESS   Ortega Grijalva is a 78 y.o. female who presents with   has a past medical history of Abnormal EKG, Acute gout involving toe of right foot, Acute on chronic combined systolic (congestive) and diastolic (congestive) heart failure ([address] Cancer ([address] Cerebrovascular disease, Clotting disorder ([address] Depression, Hemodialysis patient ([address] History of blood transfusion, Hyperlipidemia, Hypertension, Hypothyroidism, Leg swelling, Lymphedema of both lower extremities, Peripheral vascular disease ([address] Renal failure, Thrombophlebitis, and Thyroid disease. Altered Mental Status      Know to ID   · Last seen on 3/2/2022-d/c on Augmentin cover poss aspiration and colitis  Pt came in due to altered mental status. Wbc24.7 hgb10.8 dah556 cr1.6 TMAX99.3  PT WAS SEEN AND EXAMINED WITH NURSES PRESENT  PT IS NOT AROUSABLE UNABLE TO OBTAIN HISTORY    PT HAS SOFT STOOLS SOME ODOR  SACRAL AREA VIEWED HAS SMALL DTI AREA  CXRY 1. Right perihilar discoid atelectasis. 2. There are no findings of failure or pneumonia.    LEFT SHOULDER No fracture.  Increased separation of the humeral head from the osseous   glenoid and AC joint, which could represent subluxation.  Otherwise, a large   synovial effusion, hematoma, or mass cannot be excluded.  Clinical   correlation is recommended.  CT scan of the left shoulder can be performed   for further evaluation.  Cardiomegaly. LEFT HUMERUS No fracture or dislocation.  Increased separation of the humeral head from   the osseous glenoid and AC joint, which could represent subluxation.  A large   synovial effusion, hematoma, or mass cannot be excluded.  CT scan of the left   shoulder can be considered for further evaluation. piperacillin-tazobactam (ZOSYN) 3,375 mg in dextrose 5 % 50 mL IVPB extended infusion (mini-bag), Q8H  vancomycin (VANCOCIN) oral solution 125 mg, 4 times per day  cycloSPORINE (SANDIMMUNE) capsule 100 mg, BID  mycophenolate (CELLCEPT) capsule 500 mg, BID  metoprolol succinate (TOPROL XL) extended release tablet 50 mg, Daily  predniSONE (DELTASONE) tablet 5 mg, Daily     LAST BLOOD CX 2/25 cons  2/28 CLEMENCIA  Summary   Technically difficult study. No definate evidence of vegetation consistent with endocarditis. Normal LV segmental wall motion. Ejection fraction is visually estimated at 60 to 65%. Normal right ventricular size and function. DOS  03/21/22   family present mother and daughter   No c/o   Questions answered     3/20/2022  In bed awake ngt out has no c/o asking a bout d/c    3/19/2022  AFEBRILE  WBC9.4 CR1.2   IN BED NAD  MOR AWAKE ALERT  COUSIN/DAUGHTER PRESENT   FUNGITELL NEG     3/18/2022  Awake today   Still with NG tube  Conversing - has been afebrile. On 2 liters nasal canula.      3/17/2022  Much more awake today conversing working with pt    3/16/2022  Son present and updated questions answered  Pt in bed has ngt briefly arouses    3/15/2022  In bed responds a little has ngt   Nurse/aide present    3/14/2022  In bed more awake has ngt  afebrile 2L cr2.95 wbc4.7     3/13/2022  In bed briefly arouses   Does not stay awake   Spoke with nurse unable to give meds    3/12/2022  briefly arouses has no abd pain    3/11/2022  In bed open eyes briefly still lethargic  cdiff +  REVIEW OF SYSTEMS     CONSTITUTIONAL:   AS IN HPI     Medications Prior to Admission: pantoprazole (PROTONIX) 40 MG tablet, Take 1 tablet by mouth every morning (before breakfast)  apixaban (ELIQUIS) 5 MG TABS tablet, Take 5 mg by mouth 2 times daily Take in the morning and at bedtime for 3 months (started 2/19/2022)  atorvastatin (LIPITOR) 10 MG tablet, Take 10 mg by mouth daily  nystatin (MYCOSTATIN) 262615 UNIT/GM powder, Apply 3 times daily. ipratropium-albuterol (DUONEB) 0.5-2.5 (3) MG/3ML SOLN nebulizer solution, Inhale 3 mLs into the lungs every 6 hours as needed for Shortness of Breath  ondansetron (ZOFRAN-ODT) 4 MG disintegrating tablet, Take 1 tablet by mouth every 8 hours as needed for Nausea or Vomiting  allopurinol (ZYLOPRIM) 100 MG tablet, Take 100 mg by mouth daily  Cyanocobalamin (B-12 COMPLIANCE INJECTION) 1000 MCG/ML KIT, Inject as directed monthly  desvenlafaxine succinate (PRISTIQ) 50 MG TB24 extended release tablet, Take 1 tablet by mouth every evening  cycloSPORINE (SANDIMMUNE) 100 MG capsule, Take 100 mg by mouth 2 times daily  predniSONE (DELTASONE) 5 MG tablet, Take 5 mg by mouth daily  folic acid (FOLVITE) 1 MG tablet, Take 1 mg by mouth daily  docusate sodium (COLACE) 100 MG capsule, Take 100 mg by mouth 2 times daily  mycophenolate (CELLCEPT) 500 MG tablet, Take 1,000 mg by mouth 2 times daily  levothyroxine (SYNTHROID) 50 MCG tablet, Take 50 mcg by mouth daily.     [DISCONTINUED] amoxicillin-clavulanate (AUGMENTIN) 875-125 MG per tablet, Take 1 tablet by mouth every 12 hours for 10 days  [DISCONTINUED] lisinopril (PRINIVIL;ZESTRIL) 5 MG tablet, take 1 tablet by mouth once daily  [DISCONTINUED] pregabalin (LYRICA) 75 MG capsule,   budesonide (PULMICORT) 0.5 MG/2ML nebulizer suspension, Take 2 mLs by nebulization 2 times daily for 10 days  [DISCONTINUED] metoprolol succinate (TOPROL XL) 50 MG extended release tablet, take 1 tablet by mouth once daily  [DISCONTINUED] cloZAPine (CLOZARIL) 50 MG tablet, Take 50 mg by mouth nightly  CURRENT MEDICATIONS     Current Facility-Administered Medications:     cycloSPORINE modified (NEORAL) capsule 100 mg, 100 mg, Oral, BID, Susannah Ramirez MD, 100 mg at 03/21/22 0900    docusate sodium (COLACE) capsule 100 mg, 100 mg, Per NG tube, BID, Asha Ramirez MD, 100 mg at 03/21/22 0900    mycophenolate (CELLCEPT) capsule 500 mg, 500 mg, Oral, BID, Susannah Ramirez MD, 500 mg at 03/21/22 0900    acyclovir (ZOVIRAX) 350 mg in dextrose 5 % 50 mL IVPB, 5 mg/kg (Adjusted), IntraVENous, Q8H, Sharan Pascual MD, Stopped at 03/21/22 1233    sodium bicarbonate tablet 650 mg, 650 mg, Oral, Daily, Lauren Kim MD, 650 mg at 03/21/22 0901    0.9 % sodium chloride infusion, , IntraVENous, PRN, Asha Ramirez MD    [facility]) tablet 37.5 mg, 37.5 mg, Oral, BID WC, Susannah Ramirez MD, 37.5 mg at 03/21/22 0900    epoetin casey-epbx (RETACRIT) injection 2,000 Units, 2,000 Units, SubCUTAneous, Q7 Days, 2,000 Units at 03/16/22 2316 **AND** epoetin casey-epbx (RETACRIT) injection 3,000 Units, 3,000 Units, SubCUTAneous, Q7 Days, Lauren Kim MD, 3,000 Units at 03/16/22 2314    magnesium oxide (MAG-OX) tablet 400 mg, 400 mg, Oral, Daily, Sarthak Quispe MD, 400 mg at 03/21/22 0900    potassium chloride (KLOR-CON M) extended release tablet 20 mEq, 20 mEq, Oral, Once, Nicole Neil MD    ipratropium-albuterol (DUONEB) nebulizer solution 1 ampule, 1 ampule, Inhalation, Q4H, Susan Green MD, 1 ampule at 03/21/22 1000    midodrine (PROAMATINE) tablet 5 mg, 5 mg, Oral, BID PRN, Nicole Neil MD, 5 mg at 03/20/22 3108    lidocaine 1 % injection 5 mL, 5 mL, IntraDERmal, Once, Nicole Neil MD    sodium chloride flush 0.9 % injection 5-40 mL, 5-40 mL, IntraVENous, 2 times per day, Nicole Neil MD, 10 mL at 03/21/22 0901    sodium chloride flush 0.9 % injection 5-40 mL, 5-40 mL, IntraVENous, PRN, Arpita Ramirez MD, 10 mL at 03/16/22 1812    0.9 % sodium chloride infusion, 25 mL, IntraVENous, PRN, Arpita Ramirez MD    heparin flush 100 UNIT/ML injection 100 Units, 1 mL, IntraVENous, 2 times per day, Vazquez Encarnacion MD, 100 Units at 03/21/22 1233    heparin flush 100 UNIT/ML injection 100 Units, 1 mL, IntraCATHeter, PRN, Vazquez Encarnacion MD    [Held by provider] metoprolol tartrate (LOPRESSOR) tablet 12.5 mg, 12.5 mg, Oral, BID, Helio Garibay MD    Fidaxomicin (DIFICID) tablet 200 mg, 200 mg, Oral, BID, Kehinde Lea MD, 200 mg at 03/21/22 0901    levoFLOXacin (LEVAQUIN) 750 MG/150ML infusion 750 mg, 750 mg, IntraVENous, Q48H, Kehinde Lea MD, Stopped at 03/19/22 1920    apixaban (ELIQUIS) tablet 5 mg, 5 mg, Oral, BID, Susannah Ramirez MD, 5 mg at 03/21/22 0900    ondansetron (ZOFRAN-ODT) disintegrating tablet 4 mg, 4 mg, Oral, Q8H PRN, Arpita Ramirez MD, 4 mg at 03/20/22 1156    atorvastatin (LIPITOR) tablet 10 mg, 10 mg, Oral, Nightly, Susannah Ramirez MD, 10 mg at 03/20/22 2001    cloZAPine (CLOZARIL) tablet 50 mg, 50 mg, Oral, Nightly, Susannah Ramirez MD, 50 mg at 03/20/22 2001    levothyroxine (SYNTHROID) tablet 50 mcg, 50 mcg, Oral, Daily, Arpita Ramirez MD, 50 mcg at 03/21/22 0531    pantoprazole (PROTONIX) tablet 40 mg, 40 mg, Oral, QAM AC, Susannah Ramirez MD, 40 mg at 03/21/22 0531    predniSONE (DELTASONE) tablet 5 mg, 5 mg, Oral, Daily, Susannah Ramirez MD, 5 mg at 03/21/22 0900    allopurinol (ZYLOPRIM) tablet 100 mg, 100 mg, Oral, Daily, Susannah Ramirez MD, 100 mg at 51/60/10 9000    folic acid (FOLVITE) tablet 1 mg, 1 mg, Oral, Daily, Susannah Ramirez MD, 1 mg at 03/21/22 0901  ALLERGIES     Macrodantin [nitrofurantoin macrocrystal] and Sulfa antibiotics  Immunization History   Administered Date(s) Administered    COVID-19, Pfizer Purple top, DILUTE for use, 12+ yrs, 30mcg/0.3mL dose 01/28/2021, 02/18/2021, 08/18/2021      Internal Administration   First Dose COVID-19, [employer] top, DILUTE for use, 12+ yrs, 30mcg/0.3mL dose  01/28/2021   Second Dose COVID-19, Pfizer Purple top, DILUTE for use, 12+ yrs, 30mcg/0.3mL dose   02/18/2021       Last COVID Lab SARS-CoV-2 (no units)   Date Value   01/20/2021 Not Detected     SARS-CoV-2 Antibody, Total (no units)   Date Value   01/13/2022 Non-Reactive     SARS-CoV-2, PCR (no units)   Date Value   02/25/2022 Not Detected     SARS-CoV-2, NAAT (no units)   Date Value   03/02/2022 Not Detected        PAST MEDICAL HISTORY     Past Medical History:   Diagnosis Date    Abnormal EKG     left bundle branch block    Acute gout involving toe of right foot 09/03/2020    Acute on chronic combined systolic (congestive) and diastolic (congestive) heart failure (HCC)     Cancer (HCC)     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    Cerebrovascular disease     Clotting disorder ([address] 1985    thrombophlebitis after c section delivery    Depression     15 years followed by dr Alyssa Riley Hemodialysis patient [facility])     History of blood transfusion     Hyperlipidemia     Hypertension     Hypothyroidism     Leg swelling 09/03/2020    Lymphedema of both lower extremities 09/03/2020    Peripheral vascular disease ([address]     Renal failure 11/2012    renal transplant    Thrombophlebitis     after c section delivery    Thyroid disease      SURGICAL HISTORY       Past Surgical History:   Procedure Laterality Date   [address] [place] [address]    one normal delivery    COLECTOMY N/A 1/15/2022    DIAGNOSTIC  LAPAROSCOPY LYSIS OF ADHESIONS performed by Smita Donaldson MD at [address] [place] [address] Drive CATH LAB PROCEDURE  2006    normal coronaries and 1996 normal coronaries    DIALYSIS FISTULA CREATION  march and july 2012    phase one and two patient will start dialysis when needed   [address]    transfemoral venous bypass grafts    IR IVC FILTER PLACEMENT W IMAGING 2/26/2022    IR IVC FILTER PLACEMENT W IMAGING 2/26/2022 [facility] SPECIAL PROCEDURES    KIDNEY TRANSPLANT  2016    KIDNEY TRANSPLANT  2015    KIDNEY TRANSPLANT  2015    PARATHYROIDECTOMY  2006    left parathyroid  implant and parathyroidectomy    TRANSESOPHAGEAL ECHOCARDIOGRAM N/A 2/28/2022    TRANSESOPHAGEAL ECHOCARDIOGRAM WITH BUBBLE STUDY performed by Kourtney Renteria MD at [facility]     ·  [address]:    03/20/22 0845 03/20/22 1700 03/20/22 2000 03/21/22 0817   BP: (!) 97/56 (!) 110/59 122/60 (!) 96/51   Pulse: 92 98 101 95   Resp: 18 16 16 16   Temp: 97.6 °F (36.4 °C) 98 °F (36.7 °C) 97.7 °F (36.5 °C) 98 °F (36.7 °C)   TempSrc: Axillary Axillary Oral Infrared   SpO2: 97% 98% 99% 95%   Weight:       Height:         Physical Exam   Constitutional/General:  In bed  Head: NC/AT  Pulmonary: . ON 2LO2 , diminished to bases. Cardiovascular:  Regular rate and rhythm  Abdomen:   distension. nt bs   Extremities:  Warm and well perfused dec rom   Skin: Warm and dry  No rash , ++ edema.   Left ue >right   Neurologic:     Awake and alert  MERCHANT yellow  3/11 right arm- midline        DIAGNOSTIC RESULTS   RADIOLOGY:   ECHO Transesophageal    Result Date: 3/1/2022  Transesophageal Echocardiography Report (CLEMENCIA)   Demographics   Patient Name      Sheree Andino       Gender              Female                    [address] [facility] Record    [id number]           Room Number         3889  Number   Account #         [de-identified]          Procedure Date      02/28/2022   Corporate ID                         Ordering Physician  Luz Bajwa MD   Accession Number  [id number]         Referring Physician   Date of Birth     1955         Sonographer         Jack Bhatti                                                           [facility]   Age               73 year(s)         Interpreting        Luz Bajwa MD                                       Physician                                        Any Other  Procedure Type of Study   CLEMENCIA procedure:Transesophageal Echo (CLEMENCIA), Color Flow Velocity Mapping. Procedure Date Date: 02/28/2022 Start: 11:00 AM Study Location: Portable Technical Quality: Adequate visualization Indications:R/O Endocarditis. Patient Status: Routine Height: 62 inches Weight: 220 pounds BSA: 1.99 m^2 BMI: 40.24 kg/m^2 BP: 138/79 mmHg CLEMENCIA Performed By: the attending and the sonographer  Type of Anesthesia: Moderate sedation   Findings   Left Ventricle  Normal LV segmental wall motion. Ejection fraction is visually estimated at 60 to 65%. Right Ventricle  Normal right ventricular size and function. Left Atrium  No evidence of thrombus within left atrium. Right Atrium  Normal right atrium size. Agitated saline injected for shunt evaluation shows no shunt. Mitral Valve  No vegetation. Mild mitral regurgitation is present. Tricuspid Valve  No vegetation. Aortic Valve  No vegetation. The aortic valve appears mildly sclerotic. Pulmonic Valve  No vegetation. Conclusions   Summary  Technically difficult study. No definate evidence of vegetation consistent with endocarditis. Normal LV segmental wall motion. Ejection fraction is visually estimated at 60 to 65%. Normal right ventricular size and function. Signature   ----------------------------------------------------------------  Electronically signed by Gertrudis Rowland MD(Interpreting  physician) on 03/01/2022 09:11 AM  ----------------------------------------------------------------  [email or web address] [id number]    CT HEAD WO CONTRAST    Result Date: 2/25/2022  EXAMINATION: CT OF THE HEAD WITHOUT CONTRAST  2/25/2022 3:08 am TECHNIQUE: CT of the head was performed without the administration of intravenous contrast. Dose modulation, iterative reconstruction, and/or weight based adjustment of the mA/kV was utilized to reduce the radiation dose to as low as reasonably achievable. COMPARISON: Correlation with MRI dated 01/16/2022 HISTORY: ORDERING SYSTEM PROVIDED HISTORY: mental status change TECHNOLOGIST PROVIDED HISTORY: Has a \"code stroke\" or \"stroke alert\" been called? ->No Reason for exam:->mental status change Decision Support Exception - unselect if not a suspected or confirmed emergency medical condition->Emergency Medical Condition (MA) FINDINGS: BRAIN/VENTRICLES: There is no acute intracranial hemorrhage, mass effect or midline shift. No abnormal extra-axial fluid collection. The gray-white differentiation is maintained without evidence of an acute infarct. There is no evidence of hydrocephalus. Minimal cortical volume loss. Scattered vascular calcifications. ORBITS: The visualized portion of the orbits demonstrate no acute abnormality. SINUSES: Significant opacification of the left greater than right mastoid air cells similar to previous. Mild mucosal thickening in the left sphenoid sinus. This was also present previously. SOFT TISSUES/SKULL:  No acute abnormality of the visualized skull or soft tissues. No acute intracranial abnormality. MRI would be useful if symptoms persist. Inflammatory changes of left greater than right mastoid air cells and left sphenoid sinus similar to prior MRI. RECOMMENDATIONS: Unavailable     NM LUNG VENT/PERFUSION (VQ)    Result Date: 2/25/2022  EXAMINATION: NUCLEAR MEDICINE VENTILATION PERFUSION SCAN. 2/25/2022 TECHNIQUE: 3.5 millicuries aerosolized Tc99m DTPA was administered via mask prior to planar imaging of the lungs in multiple projections. Then, 4.2 millicuries of Tc 75L MAA was administered intravenously prior to planar imaging of the lungs in similar projections. COMPARISON: Chest CT February 25, 2022 .  HISTORY: ORDERING SYSTEM PROVIDED HISTORY: R/O PE TECHNOLOGIST PROVIDED HISTORY: Reason for exam:->R/O PE What reading provider will be dictating this exam?->CRC FINDINGS: PERFUSION: Distribution of radiotracer is homogeneous. No segmental defects identified. VENTILATION: Ventilation images are unremarkable. CHEST CT: Small infiltrate or atelectasis posterior right lung. Negative for pulmonary embolus. CT ABDOMEN PELVIS W IV CONTRAST Additional Contrast? None    Result Date: 2/25/2022  EXAMINATION: CT OF THE ABDOMEN AND PELVIS WITH CONTRAST 2/25/2022 3:08 am TECHNIQUE: CT of the abdomen and pelvis was performed with the administration of intravenous contrast. Multiplanar reformatted images are provided for review. Dose modulation, iterative reconstruction, and/or weight based adjustment of the mA/kV was utilized to reduce the radiation dose to as low as reasonably achievable. COMPARISON: 01/17/2022 HISTORY: ORDERING SYSTEM PROVIDED HISTORY: Abdominal distention, AMS TECHNOLOGIST PROVIDED HISTORY: Reason for exam:->Abdominal distention, AMS Additional Contrast?->None Decision Support Exception - unselect if not a suspected or confirmed emergency medical condition->Emergency Medical Condition (MA) FINDINGS: Many images are partially degraded by patient motion related artifact. Low-attenuation focus in the central aspect of the left lobe of the liver as well as a tiny low-attenuation focus in the caudate lobe. There is also a tiny low-attenuation focus in the extreme inferior aspect of the right lobe. These are difficult to definitively characterize due to their small size but likely represent small hepatic cysts. Gallbladder is unremarkable. Spleen is normal in size. No evidence of acute pancreatitis. There is a E small exophytic cystic appearing lesion along the anterior aspect of the body of the pancreas measuring 7 Hounsfield units and 1.4 cm. This is unchanged dating back to 11/17/2021. This is also favored to be incidental but could be followed. No adrenal mass. No hydronephrosis.   The native kidneys remains significantly atrophic and contain numerous cystic lesions. Some of these are hyperdense but also unchanged dating back to the 11/17/2021 exam and favored to represent hyperdense or hemorrhagic cysts. Small renal cortical calcifications and or nonobstructing stones. A right lower quadrant renal transplant demonstrates no hydronephrosis. Small hiatal hernia. Mild fluid distention of a few small bowel loops in the upper abdomen. No bowel obstruction. The cecum is mobile. The appendix is not identified with certainty. There is some localized thickening of the proximal ascending colon. This does appear to be new compared to the previous examination. Moderate stool in the distal colon. Residual contrast in the colon likely due to contrast administered on the prior examination. Uterus is atrophic. Urinary bladder is largely decompressed with Chin catheter. Minimal gas in the bladder likely due to the catheter. Partial visualization of femoral to femoral bypass graft. Degenerative changes are present in the spine and pelvis. Please see separate dictated report for CT of the chest.     Thickening of the proximal ascending colon. Given the short-term change, this is favored to represent a localized area of colitis likely infectious or inflammatory. Neoplasia thought less likely but follow-up to resolution would be recommended. Moderate stool in the distal colon. Minimal distention of several proximal small bowel loops likely incidental or due to mild enteritis. Small hiatal hernia. Other chronic appearing findings.  RECOMMENDATIONS: Unavailable     IR GUIDED IVC FILTER PLACEMENT    Result Date: 2/26/2022  EXAMINATION: INFERIOR VENA CAVA (IVC) FILTER INSERTION 2/26/2022 Procedural Personnel: She Victoria MD HISTORY: Indication: Bleeding with IV heparin ORDERING SYSTEM PROVIDED HISTORY: IVC filter TECHNOLOGIST PROVIDED HISTORY: Reason for exam:->IVC filter Anticoagulation contraindicated SEDATION: None CONTRAST: Contrast agent: Isovue 320 Contrast volume: 30mL FLUOROSCOPY DOSE AND TYPE OR TIME AND EXPOSURES: Fluoroscopy time/Number of Images: Fluoroscopy time 1.5 minutes. Reference air [place] kerma: [id number] PROCEDURE: Informed consent for the procedure including risks, benefits and alternatives was obtained and time-out was performed prior to the procedure. Universal protocol was followed. A suitable skin site was prepared and draped using all elements of maximal sterile barrier technique including sterile gloves, sterile gown, cap, mask, large sterile sheet, sterile ultrasound probe cover, hand hygiene, and cutaneous antisepsis. Time-out was called and the patient's order and identity were verified. Local anesthesia was administered. The vessel was sonographically evaluated and judged appropriate for access. Real time ultrasound was used to visualize needle entry into the vessel and an ultrasound image was stored in PACS. Vein accessed: Right common femoral vein. Access technique: Micropuncture set with 21 gauge needle A 0.035 guide wire was used to place a catheter into the inferior vena cava. A vena cavogram was performed. A Bard Rosa filter was deployed in routine fashion in the infrarenal IVC under fluoroscopic guidance. The sheath was removed and hemostasis was achieved with manual compression. A sterile dressing was applied. Patient tolerated procedure well. Complications: No immediate complications. Standardized report: [email or web address] [id number] FINDINGS: US: The access vein is patent. Inferior Vena Cavogram: The vena cava is patent and normal in size without thrombus or anomalies. Post-placement imaging: Spot filmdemonstrates the filter in the infrarenalvena cava with less than 15 degrees tilt from the vena cava access. Successful vena cava filter placement. PLAN: Retrieval intent (MIPS): The filter is potentially retrievable.  Plan/Timing For Retrieval: Ordering Physician/Contact For Retrieval Plan: Saud Reddy     XR CHEST PORTABLE    Result Date: 3/9/2022  EXAMINATION: ONE XRAY VIEW OF THE CHEST 3/9/2022 10:28 am COMPARISON: 02/25/2022 HISTORY: ORDERING SYSTEM PROVIDED HISTORY: ams TECHNOLOGIST PROVIDED HISTORY: Reason for exam:->ams FINDINGS: The cardiac silhouette is at upper limits of normal in size. There are no findings of failure There is a linear focus seen within the right perihilar region favored to represent atelectasis. There is no focal consolidation seen within the right or left lung to suggest pneumonia. There is no pleural effusion. 1. Right perihilar discoid atelectasis. 2. There are no findings of failure or pneumonia. XR CHEST 1 VIEW    Result Date: 2/25/2022  EXAMINATION: ONE XRAY VIEW OF THE CHEST 2/25/2022 2:49 pm COMPARISON: None. HISTORY: ORDERING SYSTEM PROVIDED HISTORY: needs to be done with VQ scan TECHNOLOGIST PROVIDED HISTORY: Last chest Xray done more than 24 hours ago Reason for exam:->needs to be done with VQ scan FINDINGS: The heart is mildly enlarged. There are no findings of failure. The lungs are clear. There is no focal infiltrate or effusion. .     1. Mild cardiomegaly. There is no evidence of failure or pneumonia. CTA PULMONARY W CONTRAST    Result Date: 2/25/2022  EXAMINATION: CTA OF THE CHEST 2/25/2022 3:08 am TECHNIQUE: CTA of the chest was performed after the administration of intravenous contrast.  Multiplanar reformatted images are provided for review. MIP images are provided for review. Dose modulation, iterative reconstruction, and/or weight based adjustment of the mA/kV was utilized to reduce the radiation dose to as low as reasonably achievable.  COMPARISON: Portable chest dated 01/22/2022 and CT dated 01/17/2022 HISTORY: ORDERING SYSTEM PROVIDED HISTORY: Hypoxic, SOB TECHNOLOGIST PROVIDED HISTORY: Reason for exam:->Hypoxic, SOB Decision Support Exception - unselect if not a suspected or confirmed emergency medical condition->Emergency Medical Condition (MA) FINDINGS: Pulmonary Arteries: Assessment is limited due to patient motion artifact. A small or peripheral embolism would be difficult to exclude but no large or central embolism identified. Mediastinum: Moderately severe cardiomegaly is similar to previous. No pericardial effusion. No aortic dissection. Scattered vascular calcifications. Normal-size lymph nodes without adenopathy by size criteria. Lungs/pleura: No pneumothorax. Mild atelectasis in the right greater than left lung base. Otherwise, there has been significant improvement in aeration of the lungs since previous. Upper Abdomen: Partial visualization of the upper right kidney with cystic appearing foci as well as a partially visualized hyperdense lesion likely representing a hyperdense proteinaceous cyst and similar to previous but continued follow-up would be useful. Visualized portions of the upper abdomen demonstrate no acute abnormality. Small hiatal hernia. Soft Tissues/Bones: Degenerative changes are scattered in the spine. Allowing for patient motion artifact, no identified pulmonary embolism. Significant improvement in aeration of the lungs with some residual areas of presumed atelectasis in the right greater than left base. Cardiomegaly. RECOMMENDATIONS: Unavailable     US DUP UPPER EXTREMITY LEFT VENOUS    Result Date: 2/27/2022  EXAMINATION: VENOUS ULTRASOUND OF THE LEFT UPPER EXTREMITY, 2/27/2022 12:55 pm TECHNIQUE: Duplex ultrasound using B-mode/gray scaled imaging and Doppler spectral analysis and color flow was obtained of the deep venous structures of the left upper extremity. COMPARISON: None. HISTORY: ORDERING SYSTEM PROVIDED HISTORY: left arm edema TECHNOLOGIST PROVIDED HISTORY: Portable please if possible Reason for exam:->left arm edema What reading provider will be dictating this exam?->CRC FINDINGS: There is normal flow and compressibility of the visualized venous structures. There is no evidence of echogenic thrombus.  The veins demonstrate good compressibility with normal color flow study and spectral analysis. The AV fistula is patent. No evidence of DVT. IR ULTRASOUND GUIDANCE VASCULAR ACCESS    Result Date: 2/26/2022  EXAMINATION: INFERIOR VENA CAVA (IVC) FILTER INSERTION 2/26/2022 Procedural Personnel: Kamar Cobb MD HISTORY: Indication: Bleeding with IV heparin ORDERING SYSTEM PROVIDED HISTORY: IVC filter TECHNOLOGIST PROVIDED HISTORY: Reason for exam:->IVC filter Anticoagulation contraindicated SEDATION: None CONTRAST: Contrast agent: Isovue 320 Contrast volume: 30mL FLUOROSCOPY DOSE AND TYPE OR TIME AND EXPOSURES: Fluoroscopy time/Number of Images: Fluoroscopy time 1.5 minutes. Reference [facility] kerma: [id number] PROCEDURE: Informed consent for the procedure including risks, benefits and alternatives was obtained and time-out was performed prior to the procedure. Universal protocol was followed. A suitable skin site was prepared and draped using all elements of maximal sterile barrier technique including sterile gloves, sterile gown, cap, mask, large sterile sheet, sterile ultrasound probe cover, hand hygiene, and cutaneous antisepsis. Time-out was called and the patient's order and identity were verified. Local anesthesia was administered. The vessel was sonographically evaluated and judged appropriate for access. Real time ultrasound was used to visualize needle entry into the vessel and an ultrasound image was stored in PACS. Vein accessed: Right common femoral vein. Access technique: Micropuncture set with 21 gauge needle A 0.035 guide wire was used to place a catheter into the inferior vena cava. A vena cavogram was performed. A Bard Rosa filter was deployed in routine fashion in the infrarenal IVC under fluoroscopic guidance. The sheath was removed and hemostasis was achieved with manual compression. A sterile dressing was applied. Patient tolerated procedure well. Complications: No immediate complications. Standardized report: [id number] [id number] FINDINGS: US: The access vein is patent. Inferior Vena Cavogram: The vena cava is patent and normal in size without thrombus or anomalies. Post-placement imaging: Spot filmdemonstrates the filter in the infrarenalvena cava with less than 15 degrees tilt from the vena cava access. Successful vena cava filter placement. PLAN: Retrieval intent (MIPS): The filter is potentially retrievable. Plan/Timing For Retrieval: Ordering Physician/Contact For Retrieval Plan: Felicita GUERRIER MODIFIED BARIUM SWALLOW W VIDEO    Result Date: 2/26/2022  EXAMINATION: MODIFIED BARIUM SWALLOW WAS PERFORMED IN CONJUNCTION WITH SPEECH PATHOLOGY SERVICES TECHNIQUE: Fluoroscopic evaluation of the swallowing mechanism was performed using cineradiography with multiple consistency of barium product in conjunction with speech pathology services. FLUOROSCOPY DOSE AND TYPE OR TIME AND EXPOSURES: Fluoroscopy time 2.1 minutes. Air kerma 5.49 mGy COMPARISON: None HISTORY: ORDERING SYSTEM PROVIDED HISTORY: dysphagia, aspriation pna? TECHNOLOGIST PROVIDED HISTORY: Reason for exam:->dysphagia, aspriation pna? FINDINGS: There was oral delay and pharyngeal delay. Laryngeal elevation was adequate. There was retention in the piriform sinuses but not the vallecula. Transient laryngeal penetration is seen with thin liquid barium. Otherwise, no aspiration or laryngeal penetration is seen. Strands it laryngeal penetration with thin liquid barium. No evidence of aspiration. Please see separate speech pathology report for full discussion of findings and recommendations.  RECOMMENDATIONS: Unavailable     US DUP LOWER EXTREMITIES BILATERAL VENOUS    Result Date: 2/25/2022  EXAMINATION: DUPLEX VENOUS ULTRASOUND OF THE BILATERAL LOWER EXTREMITIES2/25/2022 2:47 pm TECHNIQUE: Duplex ultrasound using B-mode/gray scaled imaging, Doppler spectral analysis and color flow Doppler was obtained of the deep venous structures of the lower bilateral extremities. COMPARISON: None. HISTORY: ORDERING SYSTEM PROVIDED HISTORY: R/O DVT TECHNOLOGIST PROVIDED HISTORY: Reason for exam:->R/O DVT What reading provider will be dictating this exam?->CRC FINDINGS: Right lower extremity:  The visualized veins of the bilateral lower extremities are patent and free of echogenic thrombus. The veins demonstrate good compressibility with normal color flow study and spectral analysis. Left lower extremity: There are incompletely occlusive thrombi within the mid and distal left femoral vein and the popliteal vein. They have developed in the interim since the prior lower extremity Doppler from 11/17/2021.     1. INCOMPLETELY OCCLUSIVE THROMBI in the mid and distal LEFT femoral vein and within the popliteal vein. They have developed in the interim since the previous study from 11/17/2021. 2. No evidence of DVT in the right lower extremity. RECOMMENDATIONS: Unavailable     LABS  Recent Labs     03/19/22 0526 03/20/22 0600 03/21/22  0537   WBC 9.4 10.5 10.9   HGB 10.3* 9.7* 8.6*   HCT 33.9* 32.0* 28.8*   .4* 102.6* 104.0*    183 182     Recent Labs     03/19/22 0526 03/19/22 0526 03/20/22 0600 03/20/22 0600 03/21/22  0537     --  137  --  136   K 3.5   < > 3.6   < > 3.8   *   < > 101   < > 101   CO2 28   < > 27   < > 25   BUN 28*  --  26*  --  27*   CREATININE 1.2*  --  1.3*  --  1.7*   GFRAA 54  --  50  --  36   LABGLOM 45  --  41  --  30   GLUCOSE 111*  --  92  --  100*   PROT 4.4*  --  4.3*  --  4.0*   LABALBU 3.0*  --  2.7*  --  2.4*   CALCIUM 10.2  --  9.5  --  9.2   BILITOT 0.4  --  0.5  --  0.4   ALKPHOS 44  --  43  --  42   AST 20  --  50*  --  33*   ALT 17  --  32  --  29    < > = values in this interval not displayed. No results for input(s): PROCAL in the last 72 hours.   Lab Results   Component Value Date    CRP 18.3 (H) 01/23/2022    CRP 34.6 (H) 11/17/2021     Lab Results   Component Value Date    SEDRATE 61 (H) 01/23/2022    SEDRATE 62 (H) 11/20/2021     No results found for: [id number]  Lab Results   Component Value Date    COVID19 Not Detected 03/02/2022    COVID19 Not Detected 02/25/2022     COVID-19/MARINA-COV2 LABS  Recent Labs     03/19/22  0526 03/20/22  0600 03/21/22  0537   AST 20 50* 33*   ALT 17 32 29      MICROBIOLOGY:  Lab Results   Component Value Date    BC 5 Days no growth 03/09/2022    BC 5 Days no growth 02/27/2022    BC  02/25/2022     This organism was isolated in one set. Susceptibility testing is not routinely done as this  organism frequently represents skin contamination. Additional testing can be ordered by calling the  Microbiology Department.       ORG Pseudomonas aeruginosa 03/09/2022    ORG Staphylococcus coagulase-negative 02/25/2022    ORG Enterococcus faecalis 01/12/2022     Lab Results   Component Value Date    BLOODCULT2 5 Days no growth 03/09/2022    BLOODCULT2 5 Days no growth 02/25/2022    BLOODCULT2 5 Days no growth 01/24/2022    ORG Pseudomonas aeruginosa 03/09/2022    ORG Staphylococcus coagulase-negative 02/25/2022    ORG Enterococcus faecalis 01/12/2022     WOUND/ABSCESS   Date Value Ref Range Status   01/24/2022 Growth not present  Final     Urine Culture, Routine   Date Value Ref Range Status   03/09/2022 75,000 CFU/ml  Final   02/26/2022 Growth not present  Final   01/23/2022 Growth not present  Final     MRSA Culture Only   Date Value Ref Range Status   11/17/2021 Methicillin resistant Staph aureus not isolated  Final     FINAL IMPRESSION    Patient is a 78 y.o. female who presented with   Chief Complaint   Patient presents with    Altered Mental Status     from NH    and admitted for Confusion [R41.0]  Acute respiratory failure with hypoxia ([address] [J96.01]  Acute respiratory failure with hypoxemia (HCC) [J96.01]     · PT WITH ENCEPHALOPTHAY METABOLIC VS INFECTIOUS ETIOLOGY -improved   · H/O hsv IG G +   · S/P RX ASPIRATION PNEUMONIA/COLITIS  · H/O COVID  · KIDNEY TRANSPLANT ON IMMUNOSUPPRESSIVE  · Poss UTI Pseudomonas    · MASTOIDITIS/SINUSITIS    Plan:  · Continue dificid for 1 more days stop on dc then vanco po  taper   · Continue acyclovir- IV will switch to PO in another day or so- cont suppressive   · On levaquin IV- Day #9  to stop on d/c  · Cont current care   · PT TAKING PO   Remove picc on d/c  Can d/c   F/u 1 month  Imaging and labs were reviewed per medical records      Thank you for involving me in the care of Aleena Connie. Please do not hesitate to call for any questions or concerns.      Electronically signed by Carmen Smith MD on 3/21/2022 at 12:57 PM

## 2022-03-21 NOTE — CARE COORDINATION
Ss note:3/21/2022.11:56 AM WILL NEED NEG COVID ON DAY OF DISCHARGE. [facility] to [facility]. Pt has been accepted at hospitals. for skilled rehab, bed is available, NO PRECERT. Pt and son in agreement with this SNF. FACILITY CANNOT ACCEPT ON DIFFICID. Need Hens and signed GINA.  AG Ghotra

## 2022-03-21 NOTE — PROGRESS NOTES
Speech Language Pathology      NAME:  Akil Conte  :  1955  DATE: 3/21/2022  ROOM:  [address] [place] [address]    Patient seen for dysphagia therapy 30 minutes. Patient very alert and feeding self independently. Patient with good mastication and no oral residue. No cough with thin. Improving upper extremity strength. Patient feels ready to try regular solids will complete trial and advance if appropriate.       Confusion [R41.0]  Acute respiratory failure with hypoxia ([address] [J96.01]  Acute respiratory failure with hypoxemia ([address] [J96.01]    [id number]  dysphagia tx    Reynaldo Veliz MSCCC/SLP  Speech Language Pathologist  [phone]

## 2022-03-21 NOTE — CARE COORDINATION
Ss note:3/21/2022.10:44 AM ADDENDUM:. NEED NEG COVID ON DAY OF DISCHARGE. [facility] and [address] [place] [address] unable to accept pt. Awaiting response from [facility]. Pt and son agreeable to [facility] if no other beds. Liaison at [facility] jennifer has a bed as long as pt is NOT on Difficid. [facility] [place], NO PRECERT. AG Patterson    Ss note:3/21/2022.9:48 AM NEED NEG RAPID COVID ON DAY OF DISCHARGE. Pt is alert/oriented times 4, pleasant cooperative. Met with pt to update on new SNF choices & sw indicated to pt that son Mirela Duvall aware/agreeable to [facility] SNF as discussed with son last week. Pt states to follow up with son for final SNF. SW mad contact with [facility] this morning. sw has referrals out to [facility], [address] and [address] [place] [address] at [place] to check acceptance/bed availability as last week no beds. Awaiting return calls. Son indicated that [facility] is acceptable if no beds at other 3 snfs listed above. NO PRECERT, need updated therapy notes and DIFFICID must be completed prior to discharge. Need Hens and signed GINA. Son relays goal is for pt to return home once improved. Pt has [employer] medicaid secondary.  AG Rodriguez

## 2022-03-22 LAB
ALBUMIN SERPL-MCNC: 2.6 G/DL (ref 3.5–5.2)
ALP BLD-CCNC: 49 U/L (ref 35–104)
ALT SERPL-CCNC: 28 U/L (ref 0–32)
ANION GAP SERPL CALCULATED.3IONS-SCNC: 10 MMOL/L (ref 7–16)
ANISOCYTOSIS: ABNORMAL
AST SERPL-CCNC: 31 U/L (ref 0–31)
BASOPHILS ABSOLUTE: 0 E9/L (ref 0–0.2)
BASOPHILS RELATIVE PERCENT: 0.4 % (ref 0–2)
BILIRUB SERPL-MCNC: 0.4 MG/DL (ref 0–1.2)
BUN BLDV-MCNC: 25 MG/DL (ref 6–23)
CALCIUM SERPL-MCNC: 9.1 MG/DL (ref 8.6–10.2)
CHLORIDE BLD-SCNC: 97 MMOL/L (ref 98–107)
CHLORIDE URINE RANDOM: <20 MMOL/L
CO2: 24 MMOL/L (ref 22–29)
CREAT SERPL-MCNC: 1.7 MG/DL (ref 0.5–1)
CREATININE URINE: 62 MG/DL (ref 29–226)
EOSINOPHILS ABSOLUTE: 0.1 E9/L (ref 0.05–0.5)
EOSINOPHILS RELATIVE PERCENT: 0.9 % (ref 0–6)
GFR AFRICAN AMERICAN: 36
GFR NON-AFRICAN AMERICAN: 30 ML/MIN/1.73
GLUCOSE BLD-MCNC: 124 MG/DL (ref 74–99)
HCT VFR BLD CALC: 29.2 % (ref 34–48)
HEMOGLOBIN: 9.2 G/DL (ref 11.5–15.5)
HYPOCHROMIA: ABNORMAL
LYMPHOCYTES ABSOLUTE: 0.97 E9/L (ref 1.5–4)
LYMPHOCYTES RELATIVE PERCENT: 9.4 % (ref 20–42)
MCH RBC QN AUTO: 31.5 PG (ref 26–35)
MCHC RBC AUTO-ENTMCNC: 31.5 % (ref 32–34.5)
MCV RBC AUTO: 100 FL (ref 80–99.9)
METAMYELOCYTES RELATIVE PERCENT: 1.7 % (ref 0–1)
MONOCYTES ABSOLUTE: 0.32 E9/L (ref 0.1–0.95)
MONOCYTES RELATIVE PERCENT: 2.6 % (ref 2–12)
MYELOCYTE PERCENT: 1.7 % (ref 0–0)
NEUTROPHILS ABSOLUTE: 9.4 E9/L (ref 1.8–7.3)
NEUTROPHILS RELATIVE PERCENT: 83.8 % (ref 43–80)
NUCLEATED RED BLOOD CELLS: 0.9 /100 WBC
OVALOCYTES: ABNORMAL
PDW BLD-RTO: 17.8 FL (ref 11.5–15)
PLATELET # BLD: 187 E9/L (ref 130–450)
PMV BLD AUTO: 12 FL (ref 7–12)
POIKILOCYTES: ABNORMAL
POLYCHROMASIA: ABNORMAL
POTASSIUM SERPL-SCNC: 3.8 MMOL/L (ref 3.5–5)
RBC # BLD: 2.92 E12/L (ref 3.5–5.5)
SARS-COV-2, NAAT: NOT DETECTED
SODIUM BLD-SCNC: 131 MMOL/L (ref 132–146)
SODIUM URINE: 25 MMOL/L
TOTAL PROTEIN: 4.2 G/DL (ref 6.4–8.3)
WBC # BLD: 10.8 E9/L (ref 4.5–11.5)

## 2022-03-22 PROCEDURE — 51702 INSERT TEMP BLADDER CATH: CPT

## 2022-03-22 PROCEDURE — 87635 SARS-COV-2 COVID-19 AMP PRB: CPT

## 2022-03-22 PROCEDURE — 6370000000 HC RX 637 (ALT 250 FOR IP): Performed by: INTERNAL MEDICINE

## 2022-03-22 PROCEDURE — 6370000000 HC RX 637 (ALT 250 FOR IP): Performed by: SPECIALIST

## 2022-03-22 PROCEDURE — 6360000002 HC RX W HCPCS: Performed by: INTERNAL MEDICINE

## 2022-03-22 PROCEDURE — 2580000003 HC RX 258: Performed by: INTERNAL MEDICINE

## 2022-03-22 PROCEDURE — 84300 ASSAY OF URINE SODIUM: CPT

## 2022-03-22 PROCEDURE — 80053 COMPREHEN METABOLIC PANEL: CPT

## 2022-03-22 PROCEDURE — 1200000000 HC SEMI PRIVATE

## 2022-03-22 PROCEDURE — 2700000000 HC OXYGEN THERAPY PER DAY

## 2022-03-22 PROCEDURE — 36415 COLL VENOUS BLD VENIPUNCTURE: CPT

## 2022-03-22 PROCEDURE — 82570 ASSAY OF URINE CREATININE: CPT

## 2022-03-22 PROCEDURE — 6360000002 HC RX W HCPCS: Performed by: SPECIALIST

## 2022-03-22 PROCEDURE — 36592 COLLECT BLOOD FROM PICC: CPT

## 2022-03-22 PROCEDURE — 82436 ASSAY OF URINE CHLORIDE: CPT

## 2022-03-22 PROCEDURE — 94640 AIRWAY INHALATION TREATMENT: CPT

## 2022-03-22 PROCEDURE — 85025 COMPLETE CBC W/AUTO DIFF WBC: CPT

## 2022-03-22 PROCEDURE — 2580000003 HC RX 258: Performed by: SPECIALIST

## 2022-03-22 RX ORDER — SODIUM CHLORIDE 9 MG/ML
INJECTION, SOLUTION INTRAVENOUS CONTINUOUS
Status: DISCONTINUED | OUTPATIENT
Start: 2022-03-22 | End: 2022-03-23 | Stop reason: HOSPADM

## 2022-03-22 RX ADMIN — VENLAFAXINE 37.5 MG: 37.5 TABLET ORAL at 16:36

## 2022-03-22 RX ADMIN — MYCOPHENOLATE MOFETIL 500 MG: 250 CAPSULE ORAL at 21:15

## 2022-03-22 RX ADMIN — Medication 10 ML: at 22:31

## 2022-03-22 RX ADMIN — DOCUSATE SODIUM 100 MG: 100 CAPSULE, LIQUID FILLED ORAL at 21:16

## 2022-03-22 RX ADMIN — LEVOTHYROXINE SODIUM 50 MCG: 0.05 TABLET ORAL at 06:21

## 2022-03-22 RX ADMIN — APIXABAN 5 MG: 5 TABLET, FILM COATED ORAL at 09:25

## 2022-03-22 RX ADMIN — IPRATROPIUM BROMIDE AND ALBUTEROL SULFATE 1 AMPULE: .5; 2.5 SOLUTION RESPIRATORY (INHALATION) at 14:06

## 2022-03-22 RX ADMIN — IPRATROPIUM BROMIDE AND ALBUTEROL SULFATE 1 AMPULE: .5; 2.5 SOLUTION RESPIRATORY (INHALATION) at 18:02

## 2022-03-22 RX ADMIN — FIDAXOMICIN 200 MG: 200 TABLET, FILM COATED ORAL at 21:15

## 2022-03-22 RX ADMIN — APIXABAN 5 MG: 5 TABLET, FILM COATED ORAL at 21:16

## 2022-03-22 RX ADMIN — IPRATROPIUM BROMIDE AND ALBUTEROL SULFATE 1 AMPULE: .5; 2.5 SOLUTION RESPIRATORY (INHALATION) at 05:47

## 2022-03-22 RX ADMIN — CLOZAPINE 50 MG: 25 TABLET ORAL at 21:15

## 2022-03-22 RX ADMIN — SODIUM CHLORIDE: 9 INJECTION, SOLUTION INTRAVENOUS at 12:22

## 2022-03-22 RX ADMIN — ATORVASTATIN CALCIUM 10 MG: 10 TABLET, FILM COATED ORAL at 21:16

## 2022-03-22 RX ADMIN — IPRATROPIUM BROMIDE AND ALBUTEROL SULFATE 1 AMPULE: .5; 2.5 SOLUTION RESPIRATORY (INHALATION) at 09:08

## 2022-03-22 RX ADMIN — ONDANSETRON 4 MG: 4 TABLET, ORALLY DISINTEGRATING ORAL at 15:10

## 2022-03-22 RX ADMIN — VENLAFAXINE 37.5 MG: 37.5 TABLET ORAL at 09:24

## 2022-03-22 RX ADMIN — FIDAXOMICIN 200 MG: 200 TABLET, FILM COATED ORAL at 09:24

## 2022-03-22 RX ADMIN — DOCUSATE SODIUM 100 MG: 100 CAPSULE, LIQUID FILLED ORAL at 09:25

## 2022-03-22 RX ADMIN — SODIUM BICARBONATE 650 MG: 648 TABLET ORAL at 09:23

## 2022-03-22 RX ADMIN — PANTOPRAZOLE SODIUM 40 MG: 40 TABLET, DELAYED RELEASE ORAL at 06:21

## 2022-03-22 RX ADMIN — IPRATROPIUM BROMIDE AND ALBUTEROL SULFATE 1 AMPULE: .5; 2.5 SOLUTION RESPIRATORY (INHALATION) at 22:27

## 2022-03-22 RX ADMIN — CYCLOSPORINE 100 MG: 25 CAPSULE, LIQUID FILLED ORAL at 09:24

## 2022-03-22 RX ADMIN — CYCLOSPORINE 100 MG: 25 CAPSULE, LIQUID FILLED ORAL at 21:15

## 2022-03-22 RX ADMIN — ALLOPURINOL 100 MG: 100 TABLET ORAL at 09:25

## 2022-03-22 RX ADMIN — HEPARIN 100 UNITS: 100 SYRINGE at 09:22

## 2022-03-22 RX ADMIN — ACYCLOVIR SODIUM 350 MG: 1000 INJECTION, SOLUTION INTRAVENOUS at 21:30

## 2022-03-22 RX ADMIN — PREDNISONE 5 MG: 5 TABLET ORAL at 09:23

## 2022-03-22 RX ADMIN — MYCOPHENOLATE MOFETIL 500 MG: 250 CAPSULE ORAL at 09:23

## 2022-03-22 RX ADMIN — FOLIC ACID 1 MG: 1 TABLET ORAL at 09:24

## 2022-03-22 RX ADMIN — MAGNESIUM OXIDE 400 MG: 400 TABLET ORAL at 09:23

## 2022-03-22 RX ADMIN — ACYCLOVIR SODIUM 350 MG: 1000 INJECTION, SOLUTION INTRAVENOUS at 09:38

## 2022-03-22 ASSESSMENT — PAIN SCALES - GENERAL: PAINLEVEL_OUTOF10: 0

## 2022-03-22 NOTE — PROGRESS NOTES
Progress Note  Date:3/21/2022       Room:0415/0415-01  Patient Wes Pastrana     YOB: 1955     Age:67 y.o. Subjective    Subjective:  Symptoms:  Improved. (Alert and awake , and talkative ). Diet:  Poor intake. Activity level: Impaired due to weakness. Pain:  She reports no pain. Review of Systems  Objective         Vitals Last 24 Hours:  TEMPERATURE:  Temp  Av.5 °F (36.4 °C)  Min: 96.7 °F (35.9 °C)  Max: 98 °F (36.7 °C)  RESPIRATIONS RANGE: Resp  Av  Min: 16  Max: 16  PULSE OXIMETRY RANGE: SpO2  Av %  Min: 95 %  Max: 100 %  PULSE RANGE: Pulse  Av.7  Min: 95  Max: 103  BLOOD PRESSURE RANGE: Systolic (36JWN), AAH:616 , Min:96 , PQQ:214   ; Diastolic (15XHL), RFU:72, Min:51, Max:69    I/O (24Hr): Intake/Output Summary (Last 24 hours) at 3/21/2022 2155  Last data filed at 3/21/2022 2003  Gross per 24 hour   Intake 728.26 ml   Output 650 ml   Net 78.26 ml     Objective:  General Appearance: In no acute distress and comfortable (alert and awake , oriented x 3 ). Vital signs: (most recent): Blood pressure 112/69, pulse 103, temperature 96.7 °F (35.9 °C), temperature source Oral, resp. rate 16, height 5' 2\" (1.575 m), weight 226 lb (102.5 kg), SpO2 100 %. Vital signs are normal.    Output: Producing urine. HEENT: Normal HEENT exam.    Lungs:  Normal effort and normal respiratory rate. There are decreased breath sounds. Heart: Normal rate. Regular rhythm. Positive for murmur. Abdomen: Abdomen is soft and distended. Bowel sounds are normal.   There is no abdominal tenderness. Extremities: (Edema +2-3    Both LL )  Pulses: Distal pulses are intact. Neurological: Patient is alert and oriented to person, place and time. Pupils:  Pupils are equal, round, and reactive to light.       Labs/Imaging/Diagnostics    Labs:  CBC:  Recent Labs     22  0526 22  0600 22  0537   WBC 9.4 10.5 10.9   RBC 3.31* 3.12* 2.77* HGB 10.3* 9.7* 8.6*   HCT 33.9* 32.0* 28.8*   .4* 102.6* 104.0*   RDW 18.3* 17.7* 17.8*    183 182     CHEMISTRIES:  Recent Labs     03/19/22  0526 03/20/22  0600 03/21/22  0537    137 136   K 3.5 3.6 3.8   * 101 101   CO2 28 27 25   BUN 28* 26* 27*   CREATININE 1.2* 1.3* 1.7*   GLUCOSE 111* 92 100*     PT/INR:No results for input(s): PROTIME, INR in the last 72 hours. APTT:No results for input(s): APTT in the last 72 hours. LIVER PROFILE:  Recent Labs     03/19/22  0526 03/20/22  0600 03/21/22  0537   AST 20 50* 33*   ALT 17 32 29   BILITOT 0.4 0.5 0.4   ALKPHOS 44 43 42       Imaging Last 24 Hours:  XR HUMERUS LEFT (MIN 2 VIEWS)    Result Date: 3/10/2022  EXAMINATION: TWO XRAY VIEWS OF THE LEFT HUMERUS 3/10/2022 9:31 am COMPARISON: None. HISTORY: ORDERING SYSTEM PROVIDED HISTORY: limited mobility TECHNOLOGIST PROVIDED HISTORY: Reason for exam:->limited mobility FINDINGS: There is no evidence of fracture or dislocation. No significant osteo-degenerative changes or other osseous abnormalities are identified. There is increased separation of the humeral head from the osseous glenoid and AC joint. This could represent subluxation. The presence of a large synovial effusion, hematoma, or mass cannot be excluded. CT scan of the left shoulder can be considered for further evaluation. Surgical clips are identified in the soft tissues about the mid to distal humerus. No fracture or dislocation. Increased separation of the humeral head from the osseous glenoid and AC joint, which could represent subluxation. A large synovial effusion, hematoma, or mass cannot be excluded. CT scan of the left shoulder can be considered for further evaluation.      CT HEAD WO CONTRAST    Result Date: 3/10/2022  EXAMINATION: CT OF THE HEAD WITHOUT CONTRAST  3/10/2022 3:29 pm TECHNIQUE: CT of the head was performed without the administration of intravenous contrast. Dose modulation, iterative reconstruction, and/or weight based adjustment of the mA/kV was utilized to reduce the radiation dose to as low as reasonably achievable. COMPARISON: February 25, 2022 HISTORY: ORDERING SYSTEM PROVIDED HISTORY: AMS TECHNOLOGIST PROVIDED HISTORY: Reason for exam:->AMS Has a \"code stroke\" or \"stroke alert\" been called? ->No FINDINGS: BRAIN/VENTRICLES: There are age related cortical atrophy and periventricular white matter ischemic changes. There is no acute intracranial hemorrhage, mass effect or midline shift. No abnormal extra-axial fluid collection. The gray-white differentiation is maintained without evidence of an acute infarct. There is no evidence of hydrocephalus. ORBITS: The visualized portion of the orbits demonstrate no acute abnormality. SINUSES: There is partial opacification of bilateral mastoid air cells. Mucosal thickening in the right sphenoid sinus. SOFT TISSUES/SKULL:  No acute abnormality of the visualized skull or soft tissues. No acute intracranial abnormality or hemorrhage. Bilateral mastoiditis and right sphenoid sinusitis. IR FLUORO GUIDED CVA DEVICE PLMT/REPLACE/REMOVAL    Result Date: 3/11/2022  PROCEDURE: ULTRASOUND GUIDED VASCULAR ACCESS. FLUOROSCOPY GUIDED PICC PLACEMENT 3/11/2022. HISTORY: ORDERING SYSTEM PROVIDED HISTORY: picc line/ medline TECHNOLOGIST PROVIDED HISTORY: Reason for exam:->picc line/ medline SEDATION: None FLUOROSCOPY DOSE AND TYPE OR TIME AND EXPOSURES: Fluoroscopy time equals 8.2 minutes. Total dose equals 116 mGy TECHNIQUE: The procedure was performed under ultrasound and fluoroscopic guidance. And ultrasound image and fluoroscopic image was obtained for medical records. [facility] Billing FINDINGS: The patient's right arm was prepped and draped in a sterile fashion using a maximum sterile barrier technique. Following the uneventful administration of lidocaine I introduced a micro puncture needle into the right basilic vein using ultrasound guidance.  Through the micropuncture needle a microwire was introduced into the vein. Over the microwire a peel-away sheath was placed within the vein. A catheter measurement was obtained. The catheter was then trimmed to 25 cm and introduced over the wire into the right basilic vein. The catheter tip was placed within the right subclavian vein. .  The peel-away sheath was then removed and the PICC line was secured to the skin. A sterile dressing was applied. The patient tolerated the procedure well and no complications. A fluoroscopic image was obtained which confirms position of the PICC line catheter. Successful placement of a dual lumen power PICC line. The tip of the PICC line catheter was placed within the distal right subclavian vein. Jessica Lyndhurst IMPRESSION: Successful ultrasound and fluoroscopy guided PICC placement     XR SHOULDER LEFT (MIN 2 VIEWS)    Result Date: 3/10/2022  EXAMINATION: THREE XRAY VIEWS OF THE LEFT SHOULDER 3/10/2022 9:31 am COMPARISON: None HISTORY: ORDERING SYSTEM PROVIDED HISTORY: limited mobility and bruising TECHNOLOGIST PROVIDED HISTORY: Reason for exam:->limited mobility and bruising FINDINGS: There is no evidence of fracture. There is increased separation of the humeral head from the osseous glenoid and the acromioclavicular joint, which could represent subluxation. Otherwise, the presence of a large synovial effusion, hematoma, or mass cannot be excluded. Clinical correlation is recommended. CT scan of the left shoulder can be performed for further evaluation. The heart is enlarged. Surgical clips are identified in the soft tissues surrounding the midportion of the humerus. No fracture. Increased separation of the humeral head from the osseous glenoid and AC joint, which could represent subluxation. Otherwise, a large synovial effusion, hematoma, or mass cannot be excluded. Clinical correlation is recommended. CT scan of the left shoulder can be performed for further evaluation. Cardiomegaly. IR ULTRASOUND GUIDANCE VASCULAR ACCESS    Result Date: 3/11/2022  PROCEDURE: ULTRASOUND GUIDED VASCULAR ACCESS. FLUOROSCOPY GUIDED PICC PLACEMENT 3/11/2022. HISTORY: ORDERING SYSTEM PROVIDED HISTORY: picc line/ medline TECHNOLOGIST PROVIDED HISTORY: Reason for exam:->picc line/ medline SEDATION: None FLUOROSCOPY DOSE AND TYPE OR TIME AND EXPOSURES: Fluoroscopy time equals 8.2 minutes. Total dose equals 116 mGy TECHNIQUE: The procedure was performed under ultrasound and fluoroscopic guidance. And ultrasound image and fluoroscopic image was obtained for medical records. Rachell Salazar FINDINGS: The patient's right arm was prepped and draped in a sterile fashion using a maximum sterile barrier technique. Following the uneventful administration of lidocaine I introduced a micro puncture needle into the right basilic vein using ultrasound guidance. Through the micropuncture needle a microwire was introduced into the vein. Over the microwire a peel-away sheath was placed within the vein. A catheter measurement was obtained. The catheter was then trimmed to 25 cm and introduced over the wire into the right basilic vein. The catheter tip was placed within the right subclavian vein. .  The peel-away sheath was then removed and the PICC line was secured to the skin. A sterile dressing was applied. The patient tolerated the procedure well and no complications. A fluoroscopic image was obtained which confirms position of the PICC line catheter. Successful placement of a dual lumen power PICC line. The tip of the PICC line catheter was placed within the distal right subclavian vein. Rachell Salazar  IMPRESSION: Successful ultrasound and fluoroscopy guided PICC placement     Assessment//Plan           Hospital Problems           Last Modified POA    * (Principal) Acute respiratory failure with hypoxia ([address] 3/9/2022 Yes    Kidney transplanted 3/10/2022 Yes    Elevated troponin 3/10/2022 Yes    Paroxysmal atrial fibrillation ([address] 3/10/2022 Yes    Hypotension 3/10/2022 Yes    LBBB (left bundle branch block) 3/10/2022 Yes    History of DVT (deep vein thrombosis) 3/10/2022 Yes    Chronic anticoagulation 3/10/2022 Yes    Acute respiratory failure with hypoxemia ([facility] [address] 3/11/2022 Yes        Assessment:    Condition: In stable condition. Improving. (Sepsis ? Cause aspiration pneumonia  Was on zosyn and vanc , now on  levaquin ,   Acute respiratory failure secondary to above on o2 ,better  ,   Metabolic encephalopathy in immuno compromised host , on acyclovir for possible  herpes simplex  Encephalitis , mental status   significantly Improved    D/c NG tube continue oral intake started 3/20, oral intake   Metabolic acidosis , due to acute on chronic renal failure , creatinine worse 1.7 monitor closely if  Getting worse will add iv fluids and may be spa   cdiff colitis on  deficid till 3/22   Left femoral and popliteal DVT on eliquis . S/p IVC filter placement   Acute on chronic renal failure , on  iv fluids  Creatinine slightly worse  1.7 was 1.2 yesterday   hyperlpid , continue lipitor   Bipolar disorder on pristiq  And clozaril  Chronic renal  failure s/p renal transplant 2015 . On cellcept and cyclosporine   Hypothyroidism on levothyroxine . Hypotension resolved ,on midodrin as needed   Delirium / metabolic encephalopathy secondary to above   on  soft diet and thin liquids ,  Metabolic acidosis due to renal failure   On hco3   Malnutrition and edema , spa and lasix x3   Anemia of chronic disease transfuse 1 unit of PRBCS . Hb better on epogen   Hypernatremia, on free water intake through the NG tube,  Better / resolved  For discharge to rehab once precert is done      ).        Electronically signed by Fidel Staley MD on 3/11/22 at 8:21 PM EST

## 2022-03-22 NOTE — PROGRESS NOTES
[facility]     [address] [place] [address] 80  [place], [address]  Phone [phone]   [phone]      Admit Date: 3/9/2022 10:02 AM  Pt Name: Akil Conte  MRN: [id number]  : 1955  Reason for Consult:    Chief Complaint   Patient presents with    Altered Mental Status     from NH     Requesting Physician:  Apple Badillo MD  PCP: Jorge Luis Genao MD  History Obtained From:  patient, chart   ID consulted for Confusion [R41.0]  Acute respiratory failure with hypoxia ([address] [J96.01]  Acute respiratory failure with hypoxemia ([address] [J96.01]  on hospital day 11   Face to face encounter   [facility]       Chief Complaint   Patient presents with    Altered Mental Status     from NH     HISTORYOF PRESENT ILLNESS   Akil Conte is a 78 y.o. female who presents with   has a past medical history of Abnormal EKG, Acute gout involving toe of right foot, Acute on chronic combined systolic (congestive) and diastolic (congestive) heart failure ([address] Cancer ([address] Cerebrovascular disease, Clotting disorder ([address] Depression, Hemodialysis patient ([address] History of blood transfusion, Hyperlipidemia, Hypertension, Hypothyroidism, Leg swelling, Lymphedema of both lower extremities, Peripheral vascular disease ([address] Renal failure, Thrombophlebitis, and Thyroid disease. Altered Mental Status      Know to ID   · Last seen on 3/2/2022-d/c on Augmentin cover poss aspiration and colitis  Pt came in due to altered mental status. Wbc24.7 hgb10.8 itk648 cr1.6 TMAX99.3  PT WAS SEEN AND EXAMINED WITH NURSES PRESENT  PT IS NOT AROUSABLE UNABLE TO OBTAIN HISTORY    PT HAS SOFT STOOLS SOME ODOR  SACRAL AREA VIEWED HAS SMALL DTI AREA  CXRY 1. Right perihilar discoid atelectasis. 2. There are no findings of failure or pneumonia.    LEFT SHOULDER No fracture.  Increased separation of the humeral head from the osseous   glenoid and AC joint, which could represent subluxation.  Otherwise, a large   synovial effusion, hematoma, or mass cannot be excluded.  Clinical   correlation is recommended.  CT scan of the left shoulder can be performed   for further evaluation.  Cardiomegaly. LEFT HUMERUS No fracture or dislocation.  Increased separation of the humeral head from   the osseous glenoid and AC joint, which could represent subluxation.  A large   synovial effusion, hematoma, or mass cannot be excluded.  CT scan of the left   shoulder can be considered for further evaluation. piperacillin-tazobactam (ZOSYN) 3,375 mg in dextrose 5 % 50 mL IVPB extended infusion (mini-bag), Q8H  vancomycin (VANCOCIN) oral solution 125 mg, 4 times per day  cycloSPORINE (SANDIMMUNE) capsule 100 mg, BID  mycophenolate (CELLCEPT) capsule 500 mg, BID  metoprolol succinate (TOPROL XL) extended release tablet 50 mg, Daily  predniSONE (DELTASONE) tablet 5 mg, Daily     LAST BLOOD CX 2/25 cons  2/28 CLEMENCIA  Summary   Technically difficult study. No definate evidence of vegetation consistent with endocarditis. Normal LV segmental wall motion. Ejection fraction is visually estimated at 60 to 65%. Normal right ventricular size and function. DOS  03/22/22   In bed awake and alert has no c/o no bm today     3/21/2022  family present mother and daughter   No c/o   Questions answered     3/20/2022  In bed awake ngt out has no c/o asking a bout d/c    3/19/2022  AFEBRILE  WBC9.4 CR1.2   IN BED NAD  MOR AWAKE ALERT  COUSIN/DAUGHTER PRESENT   FUNGITELL NEG     3/18/2022  Awake today   Still with NG tube  Conversing - has been afebrile. On 2 liters nasal canula.      3/17/2022  Much more awake today conversing working with pt    3/16/2022  Son present and updated questions answered  Pt in bed has ngt briefly arouses    3/15/2022  In bed responds a little has ngt   Nurse/aide present    3/14/2022  In bed more awake has ngt  afebrile 2L cr2.95 wbc4.7     3/13/2022  In bed briefly arouses   Does not stay awake   Spoke with nurse unable to give meds    3/12/2022  briefly arouses has no abd pain    3/11/2022  In bed open eyes briefly still lethargic  cdiff +  REVIEW OF SYSTEMS     CONSTITUTIONAL:   AS IN HPI     Medications Prior to Admission: pantoprazole (PROTONIX) 40 MG tablet, Take 1 tablet by mouth every morning (before breakfast)  apixaban (ELIQUIS) 5 MG TABS tablet, Take 5 mg by mouth 2 times daily Take in the morning and at bedtime for 3 months (started 2/19/2022)  atorvastatin (LIPITOR) 10 MG tablet, Take 10 mg by mouth daily  nystatin (MYCOSTATIN) 810468 UNIT/GM powder, Apply 3 times daily. ipratropium-albuterol (DUONEB) 0.5-2.5 (3) MG/3ML SOLN nebulizer solution, Inhale 3 mLs into the lungs every 6 hours as needed for Shortness of Breath  ondansetron (ZOFRAN-ODT) 4 MG disintegrating tablet, Take 1 tablet by mouth every 8 hours as needed for Nausea or Vomiting  allopurinol (ZYLOPRIM) 100 MG tablet, Take 100 mg by mouth daily  Cyanocobalamin (B-12 COMPLIANCE INJECTION) 1000 MCG/ML KIT, Inject as directed monthly  desvenlafaxine succinate (PRISTIQ) 50 MG TB24 extended release tablet, Take 1 tablet by mouth every evening  cycloSPORINE (SANDIMMUNE) 100 MG capsule, Take 100 mg by mouth 2 times daily  predniSONE (DELTASONE) 5 MG tablet, Take 5 mg by mouth daily  folic acid (FOLVITE) 1 MG tablet, Take 1 mg by mouth daily  docusate sodium (COLACE) 100 MG capsule, Take 100 mg by mouth 2 times daily  mycophenolate (CELLCEPT) 500 MG tablet, Take 1,000 mg by mouth 2 times daily  levothyroxine (SYNTHROID) 50 MCG tablet, Take 50 mcg by mouth daily.     [DISCONTINUED] amoxicillin-clavulanate (AUGMENTIN) 875-125 MG per tablet, Take 1 tablet by mouth every 12 hours for 10 days  [DISCONTINUED] lisinopril (PRINIVIL;ZESTRIL) 5 MG tablet, take 1 tablet by mouth once daily  [DISCONTINUED] pregabalin (LYRICA) 75 MG capsule,   budesonide (PULMICORT) 0.5 MG/2ML nebulizer suspension, Take 2 mLs by nebulization 2 times daily for 10 days  [DISCONTINUED] metoprolol succinate (TOPROL XL) 50 MG extended release tablet, take 1 tablet by mouth once daily  [DISCONTINUED] cloZAPine (CLOZARIL) 50 MG tablet, Take 50 mg by mouth nightly  CURRENT MEDICATIONS     Current Facility-Administered Medications:     acyclovir (ZOVIRAX) 350 mg in dextrose 5 % 50 mL IVPB, 5 mg/kg (Adjusted), IntraVENous, Q12H, Katie Marshall MD, Last Rate: 50 mL/hr at 03/22/22 0938, 350 mg at 03/22/22 8280    simethicone (MYLICON) chewable tablet 160 mg, 160 mg, Oral, TID PRN, Ema Hooper MD, 160 mg at 03/21/22 1858    cycloSPORINE modified (NEORAL) capsule 100 mg, 100 mg, Oral, BID, Ray Ramirez MD, 100 mg at 03/22/22 7940    docusate sodium (COLACE) capsule 100 mg, 100 mg, Per NG tube, BID, Ray Ramirez MD, 100 mg at 03/22/22 3865    mycophenolate (CELLCEPT) capsule 500 mg, 500 mg, Oral, BID, Ray Ramirez MD, 500 mg at 03/22/22 6162    sodium bicarbonate tablet 650 mg, 650 mg, Oral, Daily, Laurita Armas MD, 650 mg at 03/22/22 0923    0.9 % sodium chloride infusion, , IntraVENous, PRN, Ray Ramirez MD    venlafaxine [facility]) tablet 37.5 mg, 37.5 mg, Oral, BID , Susannah Ramirez MD, 37.5 mg at 03/22/22 0189    epoetin casey-epbx (RETACRIT) injection 2,000 Units, 2,000 Units, SubCUTAneous, Q7 Days, 2,000 Units at 03/16/22 2316 **AND** epoetin casey-epbx (RETACRIT) injection 3,000 Units, 3,000 Units, SubCUTAneous, Q7 Days, Laurita Armas MD, 3,000 Units at 03/16/22 2314    magnesium oxide (MAG-OX) tablet 400 mg, 400 mg, Oral, Daily, Kendra Patel MD, 400 mg at 03/22/22 9112    potassium chloride (KLOR-CON M) extended release tablet 20 mEq, 20 mEq, Oral, Once, Ema Hooper MD    ipratropium-albuterol (DUONEB) nebulizer solution 1 ampule, 1 ampule, Inhalation, Q4H, Daquan Angulo MD, 1 ampule at 03/22/22 0908    midodrine (PROAMATINE) tablet 5 mg, 5 mg, Oral, BID PRN, Ema Hooper MD, 5 mg at 03/20/22 2289    lidocaine 1 % injection 5 mL, 5 mL, IntraDERmal, Once, Jess Bajwa MD    sodium chloride flush 0.9 % injection 5-40 mL, 5-40 mL, IntraVENous, 2 times per day, Jess Bajwa MD, 10 mL at 03/21/22 2003    sodium chloride flush 0.9 % injection 5-40 mL, 5-40 mL, IntraVENous, PRN, Beulah Ramirez MD, 10 mL at 03/16/22 1812    0.9 % sodium chloride infusion, 25 mL, IntraVENous, PRN, Jess Bajwa MD    heparin flush 100 UNIT/ML injection 100 Units, 1 mL, IntraVENous, 2 times per day, Jess Bajwa MD, 100 Units at 03/22/22 2676    heparin flush 100 UNIT/ML injection 100 Units, 1 mL, IntraCATHeter, PRN, Jess Bajwa MD    [Held by provider] metoprolol tartrate (LOPRESSOR) tablet 12.5 mg, 12.5 mg, Oral, BID, Helio Garibay MD    Fidaxomicin (DIFICID) tablet 200 mg, 200 mg, Oral, BID, Magalis Elizabeth MD, 200 mg at 03/22/22 0924    levoFLOXacin (LEVAQUIN) 750 MG/150ML infusion 750 mg, 750 mg, IntraVENous, Q48H, Magalis Elizabeth MD, Stopped at 03/21/22 1809    apixaban (ELIQUIS) tablet 5 mg, 5 mg, Oral, BID, Susannah Ramirez MD, 5 mg at 03/22/22 2308    ondansetron (ZOFRAN-ODT) disintegrating tablet 4 mg, 4 mg, Oral, Q8H PRN, Jess Bajwa MD, 4 mg at 03/20/22 1156    atorvastatin (LIPITOR) tablet 10 mg, 10 mg, Oral, Nightly, Susannah Ramirez MD, 10 mg at 03/21/22 2004    cloZAPine (CLOZARIL) tablet 50 mg, 50 mg, Oral, Nightly, Susannah Ramirez MD, 50 mg at 03/21/22 2003    levothyroxine (SYNTHROID) tablet 50 mcg, 50 mcg, Oral, Daily, Jess Bajwa MD, 50 mcg at 03/22/22 6772    pantoprazole (PROTONIX) tablet 40 mg, 40 mg, Oral, QAM AC, Beulah Ramirez MD, 40 mg at 03/22/22 1436    predniSONE (DELTASONE) tablet 5 mg, 5 mg, Oral, Daily, Jess Bajwa MD, 5 mg at 03/22/22 3856    allopurinol (ZYLOPRIM) tablet 100 mg, 100 mg, Oral, Daily, Jess Bajwa MD, 100 mg at 38/56/98 5732    folic acid (FOLVITE) tablet 1 mg, 1 mg, Oral, Daily, Jess Bajwa MD, 1 mg at 03/22/22 6294  ALLERGIES     Macrodantin [nitrofurantoin macrocrystal] and Sulfa antibiotics  Immunization History   Administered Date(s) Administered    COVID-19, Pfizer Purple top, DILUTE for use, 12+ yrs, 30mcg/0.3mL dose 2021, 2021, 2021      Internal Administration   First Dose COVID-19, Pfizer Purple top, DILUTE for use, 12+ yrs, 30mcg/0.3mL dose  2021   Second Dose COVID-19, Pfizer Purple top, DILUTE for use, 12+ yrs, 30mcg/0.3mL dose   2021       Last COVID Lab SARS-CoV-2 (no units)   Date Value   2021 Not Detected     SARS-CoV-2 Antibody, Total (no units)   Date Value   2022 Non-Reactive     SARS-CoV-2, PCR (no units)   Date Value   2022 Not Detected     SARS-CoV-2, NAAT (no units)   Date Value   2022 Not Detected        PAST MEDICAL HISTORY     Past Medical History:   Diagnosis Date    Abnormal EKG     left bundle branch block    Acute gout involving toe of right foot 2020    Acute on chronic combined systolic (congestive) and diastolic (congestive) heart failure (HCC)     Cancer (HCC)     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    Cerebrovascular disease     Clotting disorder ([address]     thrombophlebitis after c section delivery    Depression     15 years followed by dr Farzad Caldwell Hemodialysis patient [facility])     History of blood transfusion     Hyperlipidemia     Hypertension     Hypothyroidism     Leg swelling 2020    Lymphedema of both lower extremities 2020    Peripheral vascular disease ([facility]     Renal failure 2012    renal transplant    Thrombophlebitis     after c section delivery    Thyroid disease      SURGICAL HISTORY       Past Surgical History:   Procedure Laterality Date     SECTION  1985    one normal delivery    COLECTOMY N/A 1/15/2022    DIAGNOSTIC  LAPAROSCOPY LYSIS OF ADHESIONS performed by Rashmi Bales MD at [address] [place] [address] Drive CATH LAB PROCEDURE      normal coronaries and  normal coronaries    DIALYSIS FISTULA CREATION  march and july 2012    phase one and two patient will start dialysis when needed   [address]    transfemoral venous bypass grafts    IR IVC FILTER PLACEMENT W IMAGING  2/26/2022    IR IVC FILTER PLACEMENT W IMAGING 2/26/2022 [facility] SPECIAL PROCEDURES    KIDNEY TRANSPLANT  2016    KIDNEY TRANSPLANT  2015    KIDNEY TRANSPLANT  2015    PARATHYROIDECTOMY  2006    left parathyroid  implant and parathyroidectomy    TRANSESOPHAGEAL ECHOCARDIOGRAM N/A 2/28/2022    TRANSESOPHAGEAL ECHOCARDIOGRAM WITH BUBBLE STUDY performed by Janeth Renteria MD at [facility]     ·  [address] [place] [address] Ave:    03/21/22 1945 03/21/22 2219 03/22/22 0547 03/22/22 0745   BP: 112/69   (!) 97/55   Pulse: 103 110  101   Resp: 16   18   Temp: 96.7 °F (35.9 °C)   97.4 °F (36.3 °C)   TempSrc: Oral   Oral   SpO2: 100%  95% 97%   Weight:       Height:         Physical Exam   Constitutional/General:  In bed  Head: NC/AT  Pulmonary: . ON 2LO2 , diminished to bases. Cardiovascular:  Regular rate and rhythm  Abdomen:   distension. nt bs   Extremities:  Warm and well perfused dec rom   Skin:  No rash , ++ edema.   Left ue >right   Neurologic:     Awake and alert  MERCHANT yellow  3/11 right arm- midline        DIAGNOSTIC RESULTS   RADIOLOGY:   ECHO Transesophageal    Result Date: 3/1/2022  Transesophageal Echocardiography Report (CLEMENCIA)   Demographics   Patient Name      Annmarie Olmos       Gender              Female                    [address] [facility] Record    [id number]           Room Number         9652  Number   Account #         [de-identified]          Procedure Date      02/28/2022   Corporate ID                         Ordering Physician  Priya Marquis MD   Accession Number  [id number]         Referring Physician   Date of Birth     1955         Sonographer         Jack Bhatti                                                           MIKE   Age               73 year(s) Interpreting        Priya Marquis MD                                       Physician                                        Any Other  Procedure Type of Study   CLEMENCIA procedure:Transesophageal Echo (CLEMENCIA), Color Flow Velocity Mapping. Procedure Date Date: 02/28/2022 Start: 11:00 AM Study Location: Portable Technical Quality: Adequate visualization Indications:R/O Endocarditis. Patient Status: Routine Height: 62 inches Weight: 220 pounds BSA: 1.99 m^2 BMI: 40.24 kg/m^2 BP: 138/79 mmHg CLEMENCIA Performed By: the attending and the sonographer  Type of Anesthesia: Moderate sedation   Findings   Left Ventricle  Normal LV segmental wall motion. Ejection fraction is visually estimated at 60 to 65%. Right Ventricle  Normal right ventricular size and function. Left Atrium  No evidence of thrombus within left atrium. Right Atrium  Normal right atrium size. Agitated saline injected for shunt evaluation shows no shunt. Mitral Valve  No vegetation. Mild mitral regurgitation is present. Tricuspid Valve  No vegetation. Aortic Valve  No vegetation. The aortic valve appears mildly sclerotic. Pulmonic Valve  No vegetation. Conclusions   Summary  Technically difficult study. No definate evidence of vegetation consistent with endocarditis. Normal LV segmental wall motion. Ejection fraction is visually estimated at 60 to 65%. Normal right ventricular size and function. Signature   ----------------------------------------------------------------  Electronically signed by Priya Marquis MD(Interpreting  physician) on 03/01/2022 09:11 AM  ----------------------------------------------------------------  [email or web address] [id number]         LABS  Recent Labs     03/20/22  0600 03/21/22  0537 03/22/22  0617   WBC 10.5 10.9 10.8   HGB 9.7* 8.6* 9.2*   HCT 32.0* 28.8* 29.2*   .6* 104.0* 100.0*    182 187 Recent Labs     03/20/22  0600 03/20/22  0600 03/21/22  0537     --  136   K 3.6   < > 3.8      < > 101   CO2 27   < > 25   BUN 26*  --  27*   CREATININE 1.3*  --  1.7*   GFRAA 50  --  36   LABGLOM 41  --  30   GLUCOSE 92  --  100*   PROT 4.3*  --  4.0*   LABALBU 2.7*  --  2.4*   CALCIUM 9.5  --  9.2   BILITOT 0.5  --  0.4   ALKPHOS 43  --  42   AST 50*  --  33*   ALT 32  --  29    < > = values in this interval not displayed. Lab Results   Component Value Date    CRP 18.3 (H) 01/23/2022    CRP 34.6 (H) 11/17/2021     Lab Results   Component Value Date    SEDRATE 61 (H) 01/23/2022    SEDRATE 62 (H) 11/20/2021        Lab Results   Component Value Date    COVID19 Not Detected 03/22/2022    COVID19 Not Detected 02/25/2022     COVID-19/MARINA-COV2 LABS  Recent Labs     03/20/22  0600 03/21/22  0537   AST 50* 33*   ALT 32 29      MICROBIOLOGY:  Lab Results   Component Value Date    BC 5 Days no growth 03/09/2022    BC 5 Days no growth 02/27/2022    BC  02/25/2022     This organism was isolated in one set. Susceptibility testing is not routinely done as this  organism frequently represents skin contamination. Additional testing can be ordered by calling the  Microbiology Department.       ORG Pseudomonas aeruginosa 03/09/2022    ORG Staphylococcus coagulase-negative 02/25/2022    ORG Enterococcus faecalis 01/12/2022        WOUND/ABSCESS   Date Value Ref Range Status   01/24/2022 Growth not present  Final     Urine Culture, Routine   Date Value Ref Range Status   03/09/2022 75,000 CFU/ml  Final   02/26/2022 Growth not present  Final   01/23/2022 Growth not present  Final     MRSA Culture Only   Date Value Ref Range Status   11/17/2021 Methicillin resistant Staph aureus not isolated  Final     FINAL IMPRESSION    Patient is a 78 y.o. female who presented with   Chief Complaint   Patient presents with    Altered Mental Status     from NH    and admitted for Confusion [R41.0]  Acute respiratory failure with hypoxia ([facility] [J96.01]  Acute respiratory failure with hypoxemia ([facility]) [J96.01]     · PT WITH ENCEPHALOPTHAY METABOLIC VS INFECTIOUS ETIOLOGY -improved   · H/O hsv IG G +   · S/P RX ASPIRATION PNEUMONIA/COLITIS  · H/O COVID  · KIDNEY TRANSPLANT ON IMMUNOSUPPRESSIVE  · Poss UTI Pseudomonas    · MASTOIDITIS/SINUSITIS    Plan:  · Continue dificid for 1 more days stop on dc then vanco po  taper   · Continue acyclovir- IV will switch to PO in another day or so- cont suppressive   · On levaquin IV- Day #9  to stop on d/c  · Cont current care   · PT TAKING PO   Remove picc on d/c  Can d/c   F/u 1 month  No new issues OOB ic pt/ot  Imaging and labs were reviewed per medical records      Thank you for involving me in the care of Azalea Voss. Please do not hesitate to call for any questions or concerns.      Electronically signed by Naila Griffin MD on 3/22/2022 at 9:46 AM

## 2022-03-22 NOTE — PLAN OF CARE
Problem: Falls - Risk of:  Goal: Will remain free from falls  Description: Will remain free from falls  Outcome: Met This Shift     Problem: Mental Status - Impaired:  Goal: Mental status will improve  Description: Mental status will improve  Outcome: Met This Shift

## 2022-03-22 NOTE — CARE COORDINATION
Ss note:3/22/2022.9:57 AM Neg Rapid covid result today. Plan is hospitals for skilled rehab, pt/son in agreement with plan. Will need discharge order, Hens and signed GINA to arrange transfer and notify family.  Rosalina Cheadle, LSW

## 2022-03-22 NOTE — PROGRESS NOTES
Progress Note  Date:3/22/2022       Room:0415/0415-01  Patient Cesar Hale     YOB: 1955     Age:67 y.o. Subjective    Subjective:  Symptoms:  Improved. (Alert and awake , and talkative , sitting in bed , feels better ). Diet:  Adequate intake. Activity level: Impaired due to weakness. Pain:  She reports no pain. Review of Systems  Objective         Vitals Last 24 Hours:  TEMPERATURE:  Temp  Av.1 °F (36.2 °C)  Min: 96.7 °F (35.9 °C)  Max: 97.4 °F (36.3 °C)  RESPIRATIONS RANGE: Resp  Av  Min: 16  Max: 18  PULSE OXIMETRY RANGE: SpO2  Av %  Min: 95 %  Max: 100 %  PULSE RANGE: Pulse  Av.7  Min: 101  Max: 110  BLOOD PRESSURE RANGE: Systolic (07DZB), NST:729 , Min:97 , YQE:579   ; Diastolic (35WPC), RTA:02, Min:55, Max:69    I/O (24Hr): Intake/Output Summary (Last 24 hours) at 3/22/2022 1917  Last data filed at 3/22/2022 8133  Gross per 24 hour   Intake 371 ml   Output 800 ml   Net -429 ml     Objective:  General Appearance: In no acute distress and comfortable (alert and awake , oriented x 3 ). Vital signs: (most recent): Blood pressure (!) 97/55, pulse 101, temperature 97.4 °F (36.3 °C), temperature source Oral, resp. rate 18, height 5' 2\" (1.575 m), weight 226 lb (102.5 kg), SpO2 96 %. Vital signs are normal.    Output: Producing urine. HEENT: Normal HEENT exam.    Lungs:  Normal effort and normal respiratory rate. There are decreased breath sounds. Heart: Normal rate. Regular rhythm. Positive for murmur. Abdomen: Abdomen is soft and distended. Bowel sounds are normal.   There is no abdominal tenderness. Extremities: (Edema +2-3    Both LL )  Pulses: Distal pulses are intact. Neurological: Patient is alert and oriented to person, place and time. Pupils:  Pupils are equal, round, and reactive to light.       Labs/Imaging/Diagnostics    Labs:  CBC:  Recent Labs     22  0600 22  0537 22  0617   WBC 10.5 10.9 10.8   RBC 3.12* 2.77* 2.92*   HGB 9.7* 8.6* 9.2*   HCT 32.0* 28.8* 29.2*   .6* 104.0* 100.0*   RDW 17.7* 17.8* 17.8*    182 187     CHEMISTRIES:  Recent Labs     03/20/22  0600 03/21/22  0537 03/22/22  0935    136 131*   K 3.6 3.8 3.8    101 97*   CO2 27 25 24   BUN 26* 27* 25*   CREATININE 1.3* 1.7* 1.7*   GLUCOSE 92 100* 124*     PT/INR:No results for input(s): PROTIME, INR in the last 72 hours. APTT:No results for input(s): APTT in the last 72 hours. LIVER PROFILE:  Recent Labs     03/20/22  0600 03/21/22  0537 03/22/22  0935   AST 50* 33* 31   ALT 32 29 28   BILITOT 0.5 0.4 0.4   ALKPHOS 43 42 49       Imaging Last 24 Hours:  XR HUMERUS LEFT (MIN 2 VIEWS)    Result Date: 3/10/2022  EXAMINATION: TWO XRAY VIEWS OF THE LEFT HUMERUS 3/10/2022 9:31 am COMPARISON: None. HISTORY: ORDERING SYSTEM PROVIDED HISTORY: limited mobility TECHNOLOGIST PROVIDED HISTORY: Reason for exam:->limited mobility FINDINGS: There is no evidence of fracture or dislocation. No significant osteo-degenerative changes or other osseous abnormalities are identified. There is increased separation of the humeral head from the osseous glenoid and AC joint. This could represent subluxation. The presence of a large synovial effusion, hematoma, or mass cannot be excluded. CT scan of the left shoulder can be considered for further evaluation. Surgical clips are identified in the soft tissues about the mid to distal humerus. No fracture or dislocation. Increased separation of the humeral head from the osseous glenoid and AC joint, which could represent subluxation. A large synovial effusion, hematoma, or mass cannot be excluded. CT scan of the left shoulder can be considered for further evaluation.      CT HEAD WO CONTRAST    Result Date: 3/10/2022  EXAMINATION: CT OF THE HEAD WITHOUT CONTRAST  3/10/2022 3:29 pm TECHNIQUE: CT of the head was performed without the administration of intravenous contrast. Dose modulation, iterative reconstruction, and/or weight based adjustment of the mA/kV was utilized to reduce the radiation dose to as low as reasonably achievable. COMPARISON: February 25, 2022 HISTORY: ORDERING SYSTEM PROVIDED HISTORY: AMS TECHNOLOGIST PROVIDED HISTORY: Reason for exam:->AMS Has a \"code stroke\" or \"stroke alert\" been called? ->No FINDINGS: BRAIN/VENTRICLES: There are age related cortical atrophy and periventricular white matter ischemic changes. There is no acute intracranial hemorrhage, mass effect or midline shift. No abnormal extra-axial fluid collection. The gray-white differentiation is maintained without evidence of an acute infarct. There is no evidence of hydrocephalus. ORBITS: The visualized portion of the orbits demonstrate no acute abnormality. SINUSES: There is partial opacification of bilateral mastoid air cells. Mucosal thickening in the right sphenoid sinus. SOFT TISSUES/SKULL:  No acute abnormality of the visualized skull or soft tissues. No acute intracranial abnormality or hemorrhage. Bilateral mastoiditis and right sphenoid sinusitis. IR FLUORO GUIDED CVA DEVICE PLMT/REPLACE/REMOVAL    Result Date: 3/11/2022  PROCEDURE: ULTRASOUND GUIDED VASCULAR ACCESS. FLUOROSCOPY GUIDED PICC PLACEMENT 3/11/2022. HISTORY: ORDERING SYSTEM PROVIDED HISTORY: picc line/ medline TECHNOLOGIST PROVIDED HISTORY: Reason for exam:->picc line/ medline SEDATION: None FLUOROSCOPY DOSE AND TYPE OR TIME AND EXPOSURES: Fluoroscopy time equals 8.2 minutes. Total dose equals 116 mGy TECHNIQUE: The procedure was performed under ultrasound and fluoroscopic guidance. And ultrasound image and fluoroscopic image was obtained for medical records. Allie Bennett FINDINGS: The patient's right arm was prepped and draped in a sterile fashion using a maximum sterile barrier technique. Following the uneventful administration of lidocaine I introduced a micro puncture needle into the right basilic vein using ultrasound guidance. Through the micropuncture needle a microwire was introduced into the vein. Over the microwire a peel-away sheath was placed within the vein. A catheter measurement was obtained. The catheter was then trimmed to 25 cm and introduced over the wire into the right basilic vein. The catheter tip was placed within the right subclavian vein. .  The peel-away sheath was then removed and the PICC line was secured to the skin. A sterile dressing was applied. The patient tolerated the procedure well and no complications. A fluoroscopic image was obtained which confirms position of the PICC line catheter. Successful placement of a dual lumen power PICC line. The tip of the PICC line catheter was placed within the distal right subclavian vein. [place] IMPRESSION: Successful ultrasound and fluoroscopy guided PICC placement     XR SHOULDER LEFT (MIN 2 VIEWS)    Result Date: 3/10/2022  EXAMINATION: THREE XRAY VIEWS OF THE LEFT SHOULDER 3/10/2022 9:31 am COMPARISON: None HISTORY: ORDERING SYSTEM PROVIDED HISTORY: limited mobility and bruising TECHNOLOGIST PROVIDED HISTORY: Reason for exam:->limited mobility and bruising FINDINGS: There is no evidence of fracture. There is increased separation of the humeral head from the osseous glenoid and the acromioclavicular joint, which could represent subluxation. Otherwise, the presence of a large synovial effusion, hematoma, or mass cannot be excluded. Clinical correlation is recommended. CT scan of the left shoulder can be performed for further evaluation. The heart is enlarged. Surgical clips are identified in the soft tissues surrounding the midportion of the humerus. No fracture. Increased separation of the humeral head from the osseous glenoid and AC joint, which could represent subluxation. Otherwise, a large synovial effusion, hematoma, or mass cannot be excluded. Clinical correlation is recommended. CT scan of the left shoulder can be performed for further evaluation. Cardiomegaly. IR ULTRASOUND GUIDANCE VASCULAR ACCESS    Result Date: 3/11/2022  PROCEDURE: ULTRASOUND GUIDED VASCULAR ACCESS. FLUOROSCOPY GUIDED PICC PLACEMENT 3/11/2022. HISTORY: ORDERING SYSTEM PROVIDED HISTORY: picc line/ medline TECHNOLOGIST PROVIDED HISTORY: Reason for exam:->picc line/ medline SEDATION: None FLUOROSCOPY DOSE AND TYPE OR TIME AND EXPOSURES: Fluoroscopy time equals 8.2 minutes. Total dose equals 116 mGy TECHNIQUE: The procedure was performed under ultrasound and fluoroscopic guidance. And ultrasound image and fluoroscopic image was obtained for medical records. Megan Messi FINDINGS: The patient's right arm was prepped and draped in a sterile fashion using a maximum sterile barrier technique. Following the uneventful administration of lidocaine I introduced a micro puncture needle into the right basilic vein using ultrasound guidance. Through the micropuncture needle a microwire was introduced into the vein. Over the microwire a peel-away sheath was placed within the vein. A catheter measurement was obtained. The catheter was then trimmed to 25 cm and introduced over the wire into the right basilic vein. The catheter tip was placed within the right subclavian vein. .  The peel-away sheath was then removed and the PICC line was secured to the skin. A sterile dressing was applied. The patient tolerated the procedure well and no complications. A fluoroscopic image was obtained which confirms position of the PICC line catheter. Successful placement of a dual lumen power PICC line. The tip of the PICC line catheter was placed within the distal right subclavian vein. Megan Swenson  IMPRESSION: Successful ultrasound and fluoroscopy guided PICC placement     Assessment//Plan           Hospital Problems           Last Modified POA    * (Principal) Acute respiratory failure with hypoxia ([address] 3/9/2022 Yes    Kidney transplanted 3/10/2022 Yes    Elevated troponin 3/10/2022 Yes    Paroxysmal atrial fibrillation ([address] 3/10/2022 Yes    Hypotension 3/10/2022 Yes    LBBB (left bundle branch block) 3/10/2022 Yes    History of DVT (deep vein thrombosis) 3/10/2022 Yes    Chronic anticoagulation 3/10/2022 Yes    Acute respiratory failure with hypoxemia ([facility] [address] 3/11/2022 Yes        Assessment:    Condition: In stable condition. Improving. (Sepsis ? Cause aspiration pneumonia  Was on zosyn and vanc , now on  levaquin ,   Acute respiratory failure secondary to above on o2 ,better  ,   Metabolic encephalopathy in immuno compromised host , on acyclovir for possible  herpes simplex  Encephalitis , mental status   significantly Improved    D/c NG tube continue oral intake started 3/20, oral intake , doing better   Metabolic acidosis , due to acute on chronic renal failure , creatinine same  1.7 add iv fluids   Hyponatremia due to dehydration on iv fluids ns   cdiff colitis on  deficid till 3/22   Left femoral and popliteal DVT on eliquis . S/p IVC filter placement   Acute on chronic renal failure , on  iv fluids  Creatinine slightly worse  1.7 was 1.2 yesterday   hyperlpid , continue lipitor   Bipolar disorder on pristiq  And clozaril  Chronic renal  failure s/p renal transplant 2015 . On cellcept and cyclosporine   Hypothyroidism on levothyroxine . Hypotension resolved ,on midodrin as needed   Delirium / metabolic encephalopathy secondary to above   on  soft diet and thin liquids ,  Metabolic acidosis due to renal failure   On hco3   Malnutrition and edema , spa and lasix x3   Anemia of chronic disease transfuse 1 unit of PRBCS . Hb better on epogen   Hypernatremia, on free water intake through the NG tube,  Better / resolved  For discharge /  Already precert  In am , , check renal function in am      ).        Electronically signed by Lily Jaramillo MD on 3/11/22 at 8:21 PM EST

## 2022-03-22 NOTE — PLAN OF CARE
Problem: Falls - Risk of:  Goal: Will remain free from falls  Description: Will remain free from falls  3/22/2022 1052 by Alana Mart RN  Outcome: Met This Shift  3/22/2022 0312 by Jerardo Barger RN  Outcome: Met This Shift  Goal: Absence of physical injury  Description: Absence of physical injury  Outcome: Met This Shift     Problem: Skin Integrity:  Goal: Will show no infection signs and symptoms  Description: Will show no infection signs and symptoms  Outcome: Met This Shift  Goal: Absence of new skin breakdown  Description: Absence of new skin breakdown  Outcome: Met This Shift     Problem: Mental Status - Impaired:  Goal: Mental status will improve  Description: Mental status will improve  3/22/2022 1052 by Alana Mart RN  Outcome: Met This Shift  3/22/2022 0312 by Jerardo Barger RN  Outcome: Met This Shift     Problem: Inadequate oral food/beverage intake (NI-2.1)  Goal: Food and/or Nutrient Delivery  Description: Individualized approach for food/nutrient provision. 3/22/2022 1029 by Mar Russ, MS, RD, LD  Outcome: Met This Shift     Problem: Infection - Central Venous Catheter-Associated Bloodstream Infection:  Goal: Will show no infection signs and symptoms  Description: Will show no infection signs and symptoms  Outcome: Met This Shift     Problem:  Bowel/Gastric:  Goal: Occurrences of diarrhea will decrease  Description: Occurrences of diarrhea will decrease  Outcome: Met This Shift     Problem: Fluid Volume:  Goal: Will show no signs and symptoms of electrolyte imbalance  Description: Will show no signs and symptoms of electrolyte imbalance  Outcome: Met This Shift     Problem: Physical Regulation:  Goal: Prevent transmision of infection  Description: Prevent transmision of infection  Outcome: Met This Shift  Goal: Ability to avoid or minimize complications of infection will improve  Description: Ability to avoid or minimize complications of infection will improve  Outcome: Met This Shift  Goal: Signs and symptoms of infection will decrease  Description: Signs and symptoms of infection will decrease  Outcome: Met This Shift

## 2022-03-23 VITALS
DIASTOLIC BLOOD PRESSURE: 62 MMHG | OXYGEN SATURATION: 94 % | BODY MASS INDEX: 41.59 KG/M2 | TEMPERATURE: 97.5 F | HEIGHT: 62 IN | RESPIRATION RATE: 16 BRPM | HEART RATE: 97 BPM | WEIGHT: 226 LBS | SYSTOLIC BLOOD PRESSURE: 112 MMHG

## 2022-03-23 LAB
ALBUMIN SERPL-MCNC: 2.5 G/DL (ref 3.5–5.2)
ALP BLD-CCNC: 45 U/L (ref 35–104)
ALT SERPL-CCNC: 27 U/L (ref 0–32)
ANION GAP SERPL CALCULATED.3IONS-SCNC: 10 MMOL/L (ref 7–16)
AST SERPL-CCNC: 28 U/L (ref 0–31)
BASOPHILS ABSOLUTE: 0.04 E9/L (ref 0–0.2)
BASOPHILS RELATIVE PERCENT: 0.4 % (ref 0–2)
BILIRUB SERPL-MCNC: 0.4 MG/DL (ref 0–1.2)
BUN BLDV-MCNC: 23 MG/DL (ref 6–23)
CALCIUM SERPL-MCNC: 9 MG/DL (ref 8.6–10.2)
CHLORIDE BLD-SCNC: 101 MMOL/L (ref 98–107)
CO2: 24 MMOL/L (ref 22–29)
CREAT SERPL-MCNC: 1.7 MG/DL (ref 0.5–1)
EOSINOPHILS ABSOLUTE: 0.22 E9/L (ref 0.05–0.5)
EOSINOPHILS RELATIVE PERCENT: 2.4 % (ref 0–6)
GFR AFRICAN AMERICAN: 36
GFR NON-AFRICAN AMERICAN: 30 ML/MIN/1.73
GLUCOSE BLD-MCNC: 101 MG/DL (ref 74–99)
HCT VFR BLD CALC: 29.1 % (ref 34–48)
HEMOGLOBIN: 8.9 G/DL (ref 11.5–15.5)
IMMATURE GRANULOCYTES #: 0.35 E9/L
IMMATURE GRANULOCYTES %: 3.9 % (ref 0–5)
LYMPHOCYTES ABSOLUTE: 1.15 E9/L (ref 1.5–4)
LYMPHOCYTES RELATIVE PERCENT: 12.8 % (ref 20–42)
MCH RBC QN AUTO: 31.2 PG (ref 26–35)
MCHC RBC AUTO-ENTMCNC: 30.6 % (ref 32–34.5)
MCV RBC AUTO: 102.1 FL (ref 80–99.9)
MONOCYTES ABSOLUTE: 0.72 E9/L (ref 0.1–0.95)
MONOCYTES RELATIVE PERCENT: 8 % (ref 2–12)
NEUTROPHILS ABSOLUTE: 6.51 E9/L (ref 1.8–7.3)
NEUTROPHILS RELATIVE PERCENT: 72.5 % (ref 43–80)
PDW BLD-RTO: 18.2 FL (ref 11.5–15)
PLATELET # BLD: 183 E9/L (ref 130–450)
PMV BLD AUTO: 11.6 FL (ref 7–12)
POTASSIUM SERPL-SCNC: 4.2 MMOL/L (ref 3.5–5)
RBC # BLD: 2.85 E12/L (ref 3.5–5.5)
SODIUM BLD-SCNC: 135 MMOL/L (ref 132–146)
TOTAL PROTEIN: 4.1 G/DL (ref 6.4–8.3)
WBC # BLD: 9 E9/L (ref 4.5–11.5)

## 2022-03-23 PROCEDURE — 36415 COLL VENOUS BLD VENIPUNCTURE: CPT

## 2022-03-23 PROCEDURE — 6370000000 HC RX 637 (ALT 250 FOR IP): Performed by: INTERNAL MEDICINE

## 2022-03-23 PROCEDURE — 6360000002 HC RX W HCPCS: Performed by: INTERNAL MEDICINE

## 2022-03-23 PROCEDURE — 85025 COMPLETE CBC W/AUTO DIFF WBC: CPT

## 2022-03-23 PROCEDURE — 2580000003 HC RX 258: Performed by: SPECIALIST

## 2022-03-23 PROCEDURE — 2580000003 HC RX 258: Performed by: INTERNAL MEDICINE

## 2022-03-23 PROCEDURE — 80053 COMPREHEN METABOLIC PANEL: CPT

## 2022-03-23 PROCEDURE — 6360000002 HC RX W HCPCS: Performed by: SPECIALIST

## 2022-03-23 PROCEDURE — 51702 INSERT TEMP BLADDER CATH: CPT

## 2022-03-23 PROCEDURE — 36592 COLLECT BLOOD FROM PICC: CPT

## 2022-03-23 PROCEDURE — 6370000000 HC RX 637 (ALT 250 FOR IP): Performed by: SPECIALIST

## 2022-03-23 PROCEDURE — 94640 AIRWAY INHALATION TREATMENT: CPT

## 2022-03-23 RX ORDER — SIMETHICONE 80 MG
160 TABLET,CHEWABLE ORAL 3 TIMES DAILY PRN
Qty: 180 TABLET | Refills: 3 | DISCHARGE
Start: 2022-03-23 | End: 2022-09-15

## 2022-03-23 RX ORDER — MIDODRINE HYDROCHLORIDE 5 MG/1
5 TABLET ORAL 2 TIMES DAILY PRN
Qty: 90 TABLET | Refills: 3 | Status: ON HOLD | DISCHARGE
Start: 2022-03-23 | End: 2022-05-24 | Stop reason: HOSPADM

## 2022-03-23 RX ORDER — SODIUM BICARBONATE 650 MG/1
650 TABLET ORAL DAILY
DISCHARGE
Start: 2022-03-24 | End: 2022-09-15

## 2022-03-23 RX ADMIN — IPRATROPIUM BROMIDE AND ALBUTEROL SULFATE 1 AMPULE: .5; 2.5 SOLUTION RESPIRATORY (INHALATION) at 09:04

## 2022-03-23 RX ADMIN — IPRATROPIUM BROMIDE AND ALBUTEROL SULFATE 1 AMPULE: .5; 2.5 SOLUTION RESPIRATORY (INHALATION) at 05:38

## 2022-03-23 RX ADMIN — ACYCLOVIR SODIUM 350 MG: 1000 INJECTION, SOLUTION INTRAVENOUS at 10:16

## 2022-03-23 RX ADMIN — DOCUSATE SODIUM 100 MG: 100 CAPSULE, LIQUID FILLED ORAL at 10:13

## 2022-03-23 RX ADMIN — PREDNISONE 5 MG: 5 TABLET ORAL at 10:14

## 2022-03-23 RX ADMIN — MAGNESIUM OXIDE 400 MG: 400 TABLET ORAL at 10:14

## 2022-03-23 RX ADMIN — SODIUM BICARBONATE 650 MG: 648 TABLET ORAL at 10:13

## 2022-03-23 RX ADMIN — LEVOTHYROXINE SODIUM 50 MCG: 0.05 TABLET ORAL at 06:16

## 2022-03-23 RX ADMIN — VENLAFAXINE 37.5 MG: 37.5 TABLET ORAL at 10:13

## 2022-03-23 RX ADMIN — APIXABAN 5 MG: 5 TABLET, FILM COATED ORAL at 10:13

## 2022-03-23 RX ADMIN — Medication 10 ML: at 10:25

## 2022-03-23 RX ADMIN — SODIUM CHLORIDE: 9 INJECTION, SOLUTION INTRAVENOUS at 07:34

## 2022-03-23 RX ADMIN — IPRATROPIUM BROMIDE AND ALBUTEROL SULFATE 1 AMPULE: .5; 2.5 SOLUTION RESPIRATORY (INHALATION) at 13:04

## 2022-03-23 RX ADMIN — PANTOPRAZOLE SODIUM 40 MG: 40 TABLET, DELAYED RELEASE ORAL at 06:16

## 2022-03-23 RX ADMIN — FIDAXOMICIN 200 MG: 200 TABLET, FILM COATED ORAL at 10:13

## 2022-03-23 RX ADMIN — CYCLOSPORINE 100 MG: 25 CAPSULE, LIQUID FILLED ORAL at 10:13

## 2022-03-23 RX ADMIN — MYCOPHENOLATE MOFETIL 500 MG: 250 CAPSULE ORAL at 10:14

## 2022-03-23 RX ADMIN — FOLIC ACID 1 MG: 1 TABLET ORAL at 10:14

## 2022-03-23 RX ADMIN — HEPARIN 100 UNITS: 100 SYRINGE at 10:18

## 2022-03-23 RX ADMIN — ALLOPURINOL 100 MG: 100 TABLET ORAL at 10:13

## 2022-03-23 NOTE — PROGRESS NOTES
[facility]     [address] [place] [address]  [place], [address]  Phone [phone]   [phone]      Admit Date: 3/9/2022 10:02 AM  Pt Name: Chelita Coto  MRN: [id number]  : 1955  Reason for Consult:    Chief Complaint   Patient presents with    Altered Mental Status     from NH     Requesting Physician:  Brett Ramirez MD  PCP: Ceci Zhong MD  History Obtained From:  patient, chart   ID consulted for Confusion [R41.0]  Acute respiratory failure with hypoxia ([address] [J96.01]  Acute respiratory failure with hypoxemia ([address] [J96.01]  on hospital day 12   Face to face encounter   [facility]       Chief Complaint   Patient presents with    Altered Mental Status     from NH     HISTORYOF PRESENT ILLNESS   Chelita Coto is a 78 y.o. female who presents with   has a past medical history of Abnormal EKG, Acute gout involving toe of right foot, Acute on chronic combined systolic (congestive) and diastolic (congestive) heart failure ([address] Cancer ([address] Cerebrovascular disease, Clotting disorder ([address] Depression, Hemodialysis patient ([address] History of blood transfusion, Hyperlipidemia, Hypertension, Hypothyroidism, Leg swelling, Lymphedema of both lower extremities, Peripheral vascular disease ([address] Renal failure, Thrombophlebitis, and Thyroid disease. Altered Mental Status      Know to ID   · Last seen on 3/2/2022-d/c on Augmentin cover poss aspiration and colitis  Pt came in due to altered mental status. Wbc24.7 hgb10.8 [id number] cr1.6 TMAX99.3  PT WAS SEEN AND EXAMINED WITH NURSES PRESENT  PT IS NOT AROUSABLE UNABLE TO OBTAIN HISTORY    PT HAS SOFT STOOLS SOME ODOR  SACRAL AREA VIEWED HAS SMALL DTI AREA  CXRY 1. Right perihilar discoid atelectasis. 2. There are no findings of failure or pneumonia.    LEFT SHOULDER No fracture.  Increased separation of the humeral head from the osseous   glenoid and AC joint, which could represent subluxation.  Otherwise, a large   synovial effusion, hematoma, or mass cannot be excluded.  Clinical   correlation is recommended.  CT scan of the left shoulder can be performed   for further evaluation.  Cardiomegaly. LEFT HUMERUS No fracture or dislocation.  Increased separation of the humeral head from   the osseous glenoid and AC joint, which could represent subluxation.  A large   synovial effusion, hematoma, or mass cannot be excluded.  CT scan of the left   shoulder can be considered for further evaluation. piperacillin-tazobactam (ZOSYN) 3,375 mg in dextrose 5 % 50 mL IVPB extended infusion (mini-bag), Q8H  vancomycin (VANCOCIN) oral solution 125 mg, 4 times per day  cycloSPORINE (SANDIMMUNE) capsule 100 mg, BID  mycophenolate (CELLCEPT) capsule 500 mg, BID  metoprolol succinate (TOPROL XL) extended release tablet 50 mg, Daily  predniSONE (DELTASONE) tablet 5 mg, Daily     LAST BLOOD CX 2/25 cons  2/28 CLEMENCIA  Summary   Technically difficult study. No definate evidence of vegetation consistent with endocarditis. Normal LV segmental wall motion. Ejection fraction is visually estimated at 60 to 65%. Normal right ventricular size and function. DOS  03/23/22   In bed awake and alert has no c/o f/c/n/v/d    3/22/2022  In bed awake and alert has no c/o no bm today     3/21/2022  family present mother and daughter   No c/o   Questions answered     3/20/2022  In bed awake ngt out has no c/o asking a bout d/c    3/19/2022  AFEBRILE  WBC9.4 CR1.2   IN BED NAD  MOR AWAKE ALERT  COUSIN/DAUGHTER PRESENT   FUNGITELL NEG     3/18/2022  Awake today   Still with NG tube  Conversing - has been afebrile. On 2 liters nasal canula.      3/17/2022  Much more awake today conversing working with pt    3/16/2022  Son present and updated questions answered  Pt in bed has ngt briefly arouses    3/15/2022  In bed responds a little has ngt   Nurse/aide present    3/14/2022  In bed more awake has ngt  afebrile 2L cr2.95 wbc4.7     3/13/2022  In bed briefly arouses   Does not stay awake   Spoke with nurse unable to give meds    3/12/2022  briefly arouses has no abd pain    3/11/2022  In bed open eyes briefly still lethargic  cdiff +  REVIEW OF SYSTEMS     CONSTITUTIONAL:   AS IN HPI     Medications Prior to Admission: pantoprazole (PROTONIX) 40 MG tablet, Take 1 tablet by mouth every morning (before breakfast)  apixaban (ELIQUIS) 5 MG TABS tablet, Take 5 mg by mouth 2 times daily Take in the morning and at bedtime for 3 months (started 2/19/2022)  atorvastatin (LIPITOR) 10 MG tablet, Take 10 mg by mouth daily  nystatin (MYCOSTATIN) 407928 UNIT/GM powder, Apply 3 times daily. ipratropium-albuterol (DUONEB) 0.5-2.5 (3) MG/3ML SOLN nebulizer solution, Inhale 3 mLs into the lungs every 6 hours as needed for Shortness of Breath  ondansetron (ZOFRAN-ODT) 4 MG disintegrating tablet, Take 1 tablet by mouth every 8 hours as needed for Nausea or Vomiting  allopurinol (ZYLOPRIM) 100 MG tablet, Take 100 mg by mouth daily  Cyanocobalamin (B-12 COMPLIANCE INJECTION) 1000 MCG/ML KIT, Inject as directed monthly  desvenlafaxine succinate (PRISTIQ) 50 MG TB24 extended release tablet, Take 1 tablet by mouth every evening  cycloSPORINE (SANDIMMUNE) 100 MG capsule, Take 100 mg by mouth 2 times daily  predniSONE (DELTASONE) 5 MG tablet, Take 5 mg by mouth daily  folic acid (FOLVITE) 1 MG tablet, Take 1 mg by mouth daily  docusate sodium (COLACE) 100 MG capsule, Take 100 mg by mouth 2 times daily  mycophenolate (CELLCEPT) 500 MG tablet, Take 1,000 mg by mouth 2 times daily  levothyroxine (SYNTHROID) 50 MCG tablet, Take 50 mcg by mouth daily.     [DISCONTINUED] amoxicillin-clavulanate (AUGMENTIN) 875-125 MG per tablet, Take 1 tablet by mouth every 12 hours for 10 days  [DISCONTINUED] lisinopril (PRINIVIL;ZESTRIL) 5 MG tablet, take 1 tablet by mouth once daily  [DISCONTINUED] pregabalin (LYRICA) 75 MG capsule,   budesonide (PULMICORT) 0.5 MG/2ML nebulizer suspension, Take 2 mLs by nebulization 2 times daily for 10 days  [DISCONTINUED] metoprolol succinate (TOPROL XL) 50 MG extended release tablet, take 1 tablet by mouth once daily  [DISCONTINUED] cloZAPine (CLOZARIL) 50 MG tablet, Take 50 mg by mouth nightly  CURRENT MEDICATIONS     Current Facility-Administered Medications:     0.9 % sodium chloride infusion, , IntraVENous, Continuous, Sarah Porter MD, Last Rate: 60 mL/hr at 03/23/22 0734, New Bag at 03/23/22 0734    acyclovir (ZOVIRAX) 350 mg in dextrose 5 % 50 mL IVPB, 5 mg/kg (Adjusted), IntraVENous, Q12H, Jordan Koo MD, Stopped at 03/22/22 2236    simethicone (MYLICON) chewable tablet 160 mg, 160 mg, Oral, TID PRN, Asuncion Lopes MD, 160 mg at 03/21/22 1858    cycloSPORINE modified (NEORAL) capsule 100 mg, 100 mg, Oral, BID, Joshua Ramirez MD, 100 mg at 03/22/22 2115    docusate sodium (COLACE) capsule 100 mg, 100 mg, Per NG tube, BID, Asuncion Lopes MD, 100 mg at 03/22/22 2116    mycophenolate (CELLCEPT) capsule 500 mg, 500 mg, Oral, BID, Joshua Ramirez MD, 500 mg at 03/22/22 2115    sodium bicarbonate tablet 650 mg, 650 mg, Oral, Daily, Sarah Porter MD, 650 mg at 03/22/22 0923    0.9 % sodium chloride infusion, , IntraVENous, PRN, Asuncion Lopes MD    [facility]) tablet 37.5 mg, 37.5 mg, Oral, BID WC, Susannah Ramirez MD, 37.5 mg at 03/22/22 1636    epoetin casey-epbx (RETACRIT) injection 2,000 Units, 2,000 Units, SubCUTAneous, Q7 Days, 2,000 Units at 03/16/22 2316 **AND** epoetin casey-epbx (RETACRIT) injection 3,000 Units, 3,000 Units, SubCUTAneous, Q7 Days, Sarah Porter MD, 3,000 Units at 03/16/22 2314    magnesium oxide (MAG-OX) tablet 400 mg, 400 mg, Oral, Daily, Stormy Galvez MD, 400 mg at 03/22/22 5849    potassium chloride (KLOR-CON M) extended release tablet 20 mEq, 20 mEq, Oral, Once, Asuncion Lopes MD    ipratropium-albuterol (DUONEB) nebulizer solution 1 ampule, 1 ampule, Inhalation, Q4H, Tawanda Hallman MD, 1 ampule at 03/23/22 0538    midodrine (PROAMATINE) tablet 5 mg, 5 mg, Oral, BID PRN, Geeta Grant MD, 5 mg at 03/20/22 2535    lidocaine 1 % injection 5 mL, 5 mL, IntraDERmal, Once, Geeta Grant MD    sodium chloride flush 0.9 % injection 5-40 mL, 5-40 mL, IntraVENous, 2 times per day, Geeta Grant MD, 10 mL at 03/22/22 2231    sodium chloride flush 0.9 % injection 5-40 mL, 5-40 mL, IntraVENous, PRN, Kamila Ramirez MD, 10 mL at 03/16/22 1812    0.9 % sodium chloride infusion, 25 mL, IntraVENous, PRN, Kamila Ramirez MD    heparin flush 100 UNIT/ML injection 100 Units, 1 mL, IntraVENous, 2 times per day, Geeta Grant MD, 100 Units at 03/22/22 0086    heparin flush 100 UNIT/ML injection 100 Units, 1 mL, IntraCATHeter, PRN, Geeta Grant MD    [Held by provider] metoprolol tartrate (LOPRESSOR) tablet 12.5 mg, 12.5 mg, Oral, BID, Helio Garibay MD    Fidaxomicin (DIFICID) tablet 200 mg, 200 mg, Oral, BID, Naila Griffin MD, 200 mg at 03/22/22 2115    levoFLOXacin (LEVAQUIN) 750 MG/150ML infusion 750 mg, 750 mg, IntraVENous, Q48H, Naila Griffin MD, Stopped at 03/21/22 1809    apixaban (ELIQUIS) tablet 5 mg, 5 mg, Oral, BID, Susannah Ramirez MD, 5 mg at 03/22/22 2116    ondansetron (ZOFRAN-ODT) disintegrating tablet 4 mg, 4 mg, Oral, Q8H PRN, Geeta Grant MD, 4 mg at 03/22/22 1510    atorvastatin (LIPITOR) tablet 10 mg, 10 mg, Oral, Nightly, Susannah Ramirez MD, 10 mg at 03/22/22 2116    cloZAPine (CLOZARIL) tablet 50 mg, 50 mg, Oral, Nightly, Susannah Ramirez MD, 50 mg at 03/22/22 2115    levothyroxine (SYNTHROID) tablet 50 mcg, 50 mcg, Oral, Daily, Geeta Grant MD, 50 mcg at 03/23/22 3883    pantoprazole (PROTONIX) tablet 40 mg, 40 mg, Oral, QAM AC, Susannah Ramirez MD, 40 mg at 03/23/22 5812    predniSONE (DELTASONE) tablet 5 mg, 5 mg, Oral, Daily, Geeta Grant MD, 5 mg at 03/22/22 1152    allopurinol (ZYLOPRIM) tablet 100 mg, 100 mg, Oral, Daily, Geeta Grant MD, 100 mg at 51/99/48 9327    folic acid (FOLVITE) tablet 1 mg, 1 mg, Oral, Daily, William Dumont MD, 1 mg at 22 [phone]  ALLERGIES     Macrodantin [nitrofurantoin macrocrystal] and Sulfa antibiotics  Immunization History   Administered Date(s) Administered    COVID-19, Pfizer Purple top, DILUTE for use, 12+ yrs, 30mcg/0.3mL dose 2021, 2021, 2021      Internal Administration   First Dose COVID-19, Pfizer Purple top, DILUTE for use, 12+ yrs, 30mcg/0.3mL dose  2021   Second Dose COVID-19, Pfizer Purple top, DILUTE for use, 12+ yrs, 30mcg/0.3mL dose   2021       Last COVID Lab SARS-CoV-2 (no units)   Date Value   2021 Not Detected     SARS-CoV-2 Antibody, Total (no units)   Date Value   2022 Non-Reactive     SARS-CoV-2, PCR (no units)   Date Value   2022 Not Detected     SARS-CoV-2, NAAT (no units)   Date Value   2022 Not Detected        PAST MEDICAL HISTORY     Past Medical History:   Diagnosis Date    Abnormal EKG     left bundle branch block    Acute gout involving toe of right foot 2020    Acute on chronic combined systolic (congestive) and diastolic (congestive) heart failure (HCC)     Cancer (HCC)     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    Cerebrovascular disease     Clotting disorder ([facility] 1985    thrombophlebitis after c section delivery    Depression     15 years followed by dr Deirdre Chowdhury Hemodialysis patient [facility])     History of blood transfusion     Hyperlipidemia     Hypertension     Hypothyroidism     Leg swelling 2020    Lymphedema of both lower extremities 2020    Peripheral vascular disease ([facility]     Renal failure 2012    renal transplant    Thrombophlebitis     after c section delivery    Thyroid disease      SURGICAL HISTORY       Past Surgical History:   Procedure Laterality Date     SECTION  1985    one normal delivery    COLECTOMY N/A 1/15/2022    DIAGNOSTIC  LAPAROSCOPY LYSIS OF ADHESIONS performed by Danelle Shelton MD at [address] CATH LAB PROCEDURE  2006    normal coronaries and 1996 normal coronaries    DIALYSIS FISTULA CREATION  march and july 2012    phase one and two patient will start dialysis when needed   [address]    transfemoral venous bypass grafts    IR IVC FILTER PLACEMENT W IMAGING  2/26/2022    IR IVC FILTER PLACEMENT W IMAGING 2/26/2022  SPECIAL PROCEDURES    KIDNEY TRANSPLANT  2016    KIDNEY TRANSPLANT  2015    KIDNEY TRANSPLANT  2015    PARATHYROIDECTOMY  2006    left parathyroid  implant and parathyroidectomy    TRANSESOPHAGEAL ECHOCARDIOGRAM N/A 2/28/2022    TRANSESOPHAGEAL ECHOCARDIOGRAM WITH BUBBLE STUDY performed by Adriana Renteria MD at [facility]     ·  Victorina Lizbeth Kent Venecia 1946:    03/22/22 1802 03/22/22 1845 03/22/22 2215 03/23/22 0745   BP:  100/62  112/62   Pulse:  99 106 97   Resp:  18  16   Temp:  97.7 °F (36.5 °C)  97.5 °F (36.4 °C)   TempSrc:  Oral  Temporal   SpO2: 96% 96%  94%   Weight:       Height:         Physical Exam   Constitutional/General:  In bed inc bmi  Head: NC/AT  Pulmonary: . ON ra  , diminished to bases. Cardiovascular:  Regular rate and rhythm  Abdomen:   distension. nt bs   Extremities:  Warm and well perfused dec rom  Edema   Skin:  No rash , ++ edema.   Left ue >right   Neurologic:     Awake and alert  MERCHANT yellow  3/11 right arm- midline        DIAGNOSTIC RESULTS   RADIOLOGY:   ECHO Transesophageal    Result Date: 3/1/2022  Transesophageal Echocardiography Report (CLEMENCIA)   Demographics   Patient Name      Dana Maguire       Gender              Female                    [facility] Record    [id number]           Room Number         5702  Number   Account #         [de-identified]          Procedure Date      02/28/2022   Corporate ID                         Ordering Physician  Gertrudis Rowland MD   Accession Number  [id number]         Referring Physician   Date of Birth     1955         Sonographer         Jack Bhatti                                                           [facility]   Age               73 year(s)         Interpreting        Dae Zendejas MD                                       Physician                                        Any Other  Procedure Type of Study   CLEMENCIA procedure:Transesophageal Echo (CLEMENCIA), Color Flow Velocity Mapping. Procedure Date Date: 02/28/2022 Start: 11:00 AM Study Location: Portable Technical Quality: Adequate visualization Indications:R/O Endocarditis. Patient Status: Routine Height: 62 inches Weight: 220 pounds BSA: 1.99 m^2 BMI: 40.24 kg/m^2 BP: 138/79 mmHg CLEMENCIA Performed By: the attending and the sonographer  Type of Anesthesia: Moderate sedation   Findings   Left Ventricle  Normal LV segmental wall motion. Ejection fraction is visually estimated at 60 to 65%. Right Ventricle  Normal right ventricular size and function. Left Atrium  No evidence of thrombus within left atrium. Right Atrium  Normal right atrium size. Agitated saline injected for shunt evaluation shows no shunt. Mitral Valve  No vegetation. Mild mitral regurgitation is present. Tricuspid Valve  No vegetation. Aortic Valve  No vegetation. The aortic valve appears mildly sclerotic. Pulmonic Valve  No vegetation. Conclusions   Summary  Technically difficult study. No definate evidence of vegetation consistent with endocarditis. Normal LV segmental wall motion. Ejection fraction is visually estimated at 60 to 65%. Normal right ventricular size and function. Signature   ----------------------------------------------------------------  Electronically signed by Dae Zendejas MD(Interpreting  physician) on 03/01/2022 09:11 AM  ----------------------------------------------------------------  [email or web address] [id number]%3d         LABS  Recent Labs     03/21/22  0537 03/22/22  0617 03/23/22  0614   WBC 10.9 10.8 9.0   HGB 8.6* 9.2* 8.9*   HCT 28.8* 29.2* 29.1*   .0* 100.0* 102.1*    187 183     Recent Labs     03/21/22  0537 03/21/22  0537 03/22/22  0935 03/22/22  0935 03/23/22  0614     --  131*  --  135   K 3.8   < > 3.8   < > 4.2      < > 97*   < > 101   CO2 25   < > 24   < > 24   BUN 27*  --  25*  --  23   CREATININE 1.7*  --  1.7*  --  1.7*   GFRAA 36  --  36  --  36   LABGLOM 30  --  30  --  30   GLUCOSE 100*  --  124*  --  101*   PROT 4.0*  --  4.2*  --  4.1*   LABALBU 2.4*  --  2.6*  --  2.5*   CALCIUM 9.2  --  9.1  --  9.0   BILITOT 0.4  --  0.4  --  0.4   ALKPHOS 42  --  49  --  45   AST 33*  --  31  --  28   ALT 29  --  28  --  27    < > = values in this interval not displayed. Lab Results   Component Value Date    CRP 18.3 (H) 01/23/2022    CRP 34.6 (H) 11/17/2021     Lab Results   Component Value Date    SEDRATE 61 (H) 01/23/2022    SEDRATE 62 (H) 11/20/2021        Lab Results   Component Value Date    COVID19 Not Detected 03/22/2022    COVID19 Not Detected 02/25/2022     COVID-19/MARINA-COV2 LABS  Recent Labs     03/21/22  0537 03/22/22  0935 03/23/22  0614   AST 33* 31 28   ALT 29 28 27      MICROBIOLOGY:  Lab Results   Component Value Date    BC 5 Days no growth 03/09/2022    BC 5 Days no growth 02/27/2022    BC  02/25/2022     This organism was isolated in one set. Susceptibility testing is not routinely done as this  organism frequently represents skin contamination. Additional testing can be ordered by calling the  Microbiology Department.       ORG Pseudomonas aeruginosa 03/09/2022    ORG Staphylococcus coagulase-negative 02/25/2022    ORG Enterococcus faecalis 01/12/2022        WOUND/ABSCESS   Date Value Ref Range Status   01/24/2022 Growth not present  Final     Urine Culture, Routine   Date Value Ref Range Status   03/09/2022 75,000 CFU/ml  Final   02/26/2022 Growth not present  Final   01/23/2022 Growth not present  Final     MRSA Culture Only   Date Value Ref Range Status   11/17/2021 Methicillin resistant Staph aureus not isolated  Final     FINAL IMPRESSION    Patient is a 78 y.o. female who presented with   Chief Complaint   Patient presents with    Altered Mental Status     from NH    and admitted for Confusion [R41.0]  Acute respiratory failure with hypoxia ([address] [J96.01]  Acute respiratory failure with hypoxemia ([facility]) [J96.01]     · PT WITH ENCEPHALOPTHAY METABOLIC VS INFECTIOUS ETIOLOGY -improved   · H/O hsv IG G +   · S/P RX ASPIRATION PNEUMONIA/COLITIS  · H/O COVID  · KIDNEY TRANSPLANT ON IMMUNOSUPPRESSIVE  · Poss UTI Pseudomonas    · MASTOIDITIS/SINUSITIS    Plan:  · Continue dificid for 1 more days stop on dc then vanco po  taper   · Continue acyclovir- IV will switch to PO in another day or so- cont suppressive   · On levaquin IV- Day #9  to stop on d/c  · Cont current care   · PT TAKING PO   Remove picc on d/c  Can d/c   F/u 1 month  No new issues     Imaging and labs were reviewed per medical records      Thank you for involving me in the care of Sebastain Reed. Please do not hesitate to call for any questions or concerns.      Electronically signed by Keila Lopez MD on 3/23/2022 at 8:59 AM

## 2022-03-23 NOTE — PROGRESS NOTES
Pt has hx of cadaveric kidney transplant 7 years back   Her baseline cr has been 1.2-1.6 with a lot of fluctuation in contest of dehydration . Pt seen in her room , she is very weak and deconditoned to my eyes she has lost a lot of weight .    She has a hassan in place   She has poor po intake   She is euvolemic to mildly hypovolemic       Allergies:  Macrodantin [nitrofurantoin macrocrystal] and Sulfa antibiotics    Current Medications:    0.9 % sodium chloride infusion, Continuous  acyclovir (ZOVIRAX) 350 mg in dextrose 5 % 50 mL IVPB, Q12H  simethicone (MYLICON) chewable tablet 160 mg, TID PRN  cycloSPORINE modified (NEORAL) capsule 100 mg, BID  docusate sodium (COLACE) capsule 100 mg, BID  mycophenolate (CELLCEPT) capsule 500 mg, BID  sodium bicarbonate tablet 650 mg, Daily  0.9 % sodium chloride infusion, PRN  venlafaxine (EFFEXOR) tablet 37.5 mg, BID WC  epoetin casey-epbx (RETACRIT) injection 2,000 Units, Q7 Days   And  epoetin casey-epbx (RETACRIT) injection 3,000 Units, Q7 Days  magnesium oxide (MAG-OX) tablet 400 mg, Daily  potassium chloride (KLOR-CON M) extended release tablet 20 mEq, Once  ipratropium-albuterol (DUONEB) nebulizer solution 1 ampule, Q4H  midodrine (PROAMATINE) tablet 5 mg, BID PRN  lidocaine 1 % injection 5 mL, Once  sodium chloride flush 0.9 % injection 5-40 mL, 2 times per day  sodium chloride flush 0.9 % injection 5-40 mL, PRN  0.9 % sodium chloride infusion, PRN  heparin flush 100 UNIT/ML injection 100 Units, 2 times per day  heparin flush 100 UNIT/ML injection 100 Units, PRN  [Held by provider] metoprolol tartrate (LOPRESSOR) tablet 12.5 mg, BID  Fidaxomicin (DIFICID) tablet 200 mg, BID  levoFLOXacin (LEVAQUIN) 750 MG/150ML infusion 750 mg, Q48H  apixaban (ELIQUIS) tablet 5 mg, BID  ondansetron (ZOFRAN-ODT) disintegrating tablet 4 mg, Q8H PRN  atorvastatin (LIPITOR) tablet 10 mg, Nightly  cloZAPine (CLOZARIL) tablet 50 mg, Nightly  levothyroxine (SYNTHROID) tablet 50 mcg, Daily  pantoprazole (PROTONIX) tablet 40 mg, QAM AC  predniSONE (DELTASONE) tablet 5 mg, Daily  allopurinol (ZYLOPRIM) tablet 627 mg, Daily  folic acid (FOLVITE) tablet 1 mg, Daily        Review of Systems:   Constitutional: weakness   Eyes: no eye pain , no itching , no drainage  Ears, nose, mouth, throat, and face: no ear ,nose pain , hearing is ok ,no nasal drainage   Respiratory: no sob ,no cough ,no wheezing . Cardiovascular: no chest pain , no palpitation ,no sob . Gastrointestinal: no nausea, vomiting , constipation , no abdominal pain . Genitourinary:no urinary retention , no burning , dysuria . No polyuria   Hematologic/lymphatic: no bleeding , no cougulation issues . Musculoskeletal:no joint pain , no swelling . Neurological: no headaches ,no weakness , no numbness . Endocrine: no thirst , no weight issues . Physical exam:   Vital signs /62   Pulse 99   Temp 97.7 °F (36.5 °C) (Oral)   Resp 18   Ht 5' 2\" (1.575 m)   Wt 226 lb (102.5 kg)   SpO2 96%   BMI 41.34 kg/m²   Gen : In NAD , appears stated age . Head : NC/AT, EOMI, sclera and conjunctive are clear and anicteric. Mucous membranes dry  Neck : supple , no thyromegaly noted . Trachea midline  CV : regular rhythm, normal S1 and S2, No M/R/G . Lungs: CTAB , no wheezing , good air entry in all fields, though poor inspiratory effort  Abd : soft , NT , BS +   Skin : soft, dry . Neuro : CN  II-XII grossly intact , no focal neurologic deficit .    Psych : affect flat, minimally responsive, mostly to tactile stimulation, does not answer questions or follow commands    Data:   Labs:  CBC with Differential:    Lab Results   Component Value Date    WBC 10.8 03/22/2022    RBC 2.92 03/22/2022    HGB 9.2 03/22/2022    HCT 29.2 03/22/2022     03/22/2022    .0 03/22/2022    MCH 31.5 03/22/2022    MCHC 31.5 03/22/2022    RDW 17.8 03/22/2022    NRBC 0.9 03/22/2022    METASPCT 1.7 03/22/2022    LYMPHOPCT 9.4 03/22/2022 MONOPCT 2.6 03/22/2022    MYELOPCT 1.7 03/22/2022    BASOPCT 0.4 03/22/2022    MONOSABS 0.32 03/22/2022    LYMPHSABS 0.97 03/22/2022    EOSABS 0.10 03/22/2022    BASOSABS 0.00 03/22/2022     CMP:    Lab Results   Component Value Date     03/22/2022    K 3.8 03/22/2022    K 4.7 03/09/2022    CL 97 03/22/2022    CO2 24 03/22/2022    BUN 25 03/22/2022    CREATININE 1.7 03/22/2022    GFRAA 36 03/22/2022    LABGLOM 30 03/22/2022    GLUCOSE 124 03/22/2022    PROT 4.2 03/22/2022    LABALBU 2.6 03/22/2022    CALCIUM 9.1 03/22/2022    BILITOT 0.4 03/22/2022    ALKPHOS 49 03/22/2022    AST 31 03/22/2022    ALT 28 03/22/2022     Ionized Calcium:  No results found for: IONCA  Magnesium:    Lab Results   Component Value Date    MG 1.7 03/16/2022     Phosphorus:    Lab Results   Component Value Date    PHOS 3.0 03/16/2022     U/A:    Lab Results   Component Value Date    COLORU Yellow 03/09/2022    PHUR 5.0 03/09/2022    WBCUA 1-3 03/09/2022    RBCUA NONE 03/09/2022    RBCUA 10-20 03/12/2014    YEAST Present 04/09/2021    BACTERIA MODERATE 03/09/2022    CLARITYU Clear 03/09/2022    SPECGRAV 1.025 03/09/2022    LEUKOCYTESUR Negative 03/09/2022    UROBILINOGEN 0.2 03/09/2022    BILIRUBINUR Negative 03/09/2022    BLOODU Negative 03/09/2022    GLUCOSEU Negative 03/09/2022     Microalbumen/Creatinine ratio:  No components found for: RUCREAT  Iron Saturation:  No components found for: PERCENTFE  TIBC:    Lab Results   Component Value Date    TIBC 92 03/17/2022     FERRITIN:    Lab Results   Component Value Date    FERRITIN 745 03/17/2022        Imaging:  XR CHEST ABDOMEN NG PLACEMENT   Final Result   The enteric tube terminates in the stomach. XR CHEST PORTABLE   Final Result   Increased opacities in left lung base related to increasing subsegmental   atelectasis or pleural effusion. XR ABDOMEN FOR NG/OG/NE TUBE PLACEMENT   Final Result   Satisfactory position of the enteric tube.          XR CHEST PORTABLE   Final Result   Addendum 1 of 1   ADDENDUM:   The floor reports slow nasogastric tube output. Based on positioning on   plain film, recommend further advancement. Final   1. Possible right pleural effusion and right basilar atelectasis. 2. Cardiomegaly without pulmonary venous hypertension. 3. Possible hiatal hernia. XR ABDOMEN FOR NG/OG/NE TUBE PLACEMENT   Final Result   Enteric tube tip in the fundus of the stomach. US RETROPERITONEAL COMPLETE   Final Result   No hydronephrosis of the transplant kidney. The bladder is decompressed by a Chin catheter. IR FLUORO GUIDED CVA DEVICE PLMT/REPLACE/REMOVAL   Final Result   Successful placement of a dual lumen power PICC line. The tip of the PICC   line catheter was placed within the distal right subclavian vein. [place] IMPRESSION:   Successful ultrasound and fluoroscopy guided PICC placement         IR ULTRASOUND GUIDANCE VASCULAR ACCESS   Final Result   Successful placement of a dual lumen power PICC line. The tip of the PICC   line catheter was placed within the distal right subclavian vein. [place] Founds IMPRESSION:   Successful ultrasound and fluoroscopy guided PICC placement         CT HEAD WO CONTRAST   Final Result   No acute intracranial abnormality or hemorrhage. Bilateral mastoiditis and right sphenoid sinusitis. XR SHOULDER LEFT (MIN 2 VIEWS)   Final Result   No fracture. Increased separation of the humeral head from the osseous   glenoid and AC joint, which could represent subluxation. Otherwise, a large   synovial effusion, hematoma, or mass cannot be excluded. Clinical   correlation is recommended. CT scan of the left shoulder can be performed   for further evaluation. Cardiomegaly. XR HUMERUS LEFT (MIN 2 VIEWS)   Final Result   No fracture or dislocation. Increased separation of the humeral head from   the osseous glenoid and AC joint, which could represent subluxation.   A large synovial effusion, hematoma, or mass cannot be excluded. CT scan of the left   shoulder can be considered for further evaluation. XR CHEST PORTABLE   Final Result   1. Right perihilar discoid atelectasis. 2. There are no findings of failure or pneumonia. Assessment    -Acute kidney injury : cr today is 1.7 up from 1.3  .will recheck urine lytes start  NS at 65 cc/hr     -Chronic kidney disease with hx of DDKT : baseline cr 1.1-1.5 .     -Hyperchrloremic acidosis :better . Continue nahco3 tablets     -Hx of  donor kidney transplant : continue current immunosuppressent     -Immunosuppression host :  continue current immunosuppressent     -Anaemia of chronic disease : some of the anaemia is dilutional . continue retacrit 5000 u sc q week     -Edema mainly in UE : better . With Na going up hold lasix today .      -Hypo Magensiemia : replace     -severe deconditioning : PT/OT         Electronically signed by Holly Lenz MD on 3/22/2022

## 2022-03-23 NOTE — PROGRESS NOTES
Report called to Sukh Mustafa at [facility] [employer] [facility] [phone]. Reiterated that pt will need labs drawn on Mondays & Thursdays. All questions answered. ETA for pick-up 1500.

## 2022-03-23 NOTE — PLAN OF CARE
Problem: Falls - Risk of:  Goal: Will remain free from falls  Description: Will remain free from falls  Outcome: Met This Shift     Problem: Mental Status - Impaired:  Goal: Mental status will improve  Description: Mental status will improve  Outcome: Met This Shift     Problem: Skin Integrity:  Goal: Absence of new skin breakdown  Description: Absence of new skin breakdown  Outcome: Met This Shift

## 2022-03-23 NOTE — CARE COORDINATION
Ss note:3/23/2022.11:23 AM Discharge order noted. Neg rapid covid yesterday. [facility]. [facility] transfer to facility today at 3:00 pm. Facility liaison, nursing, pt and son Mirela Duvall aware. Hens completed.  AG Rodriguez

## 2022-03-23 NOTE — PROGRESS NOTES
[facility] MD Franchesca Medina MD Belton Lacks, MD Janit Netters MD  Progress Note    Patient's Name: Amy Zaragoza  10:25 AM  3/23/2022    Subjective  3/13 : seen in her room  Weak decondioned lying flat in bed , poor po intake , clinically euvolemic   3/14: NG tube not functioning, just replaced. No meds all day as a result. IV fluids been stopped for few hours, as well. 3/15 : seen today , o2/nc . Appear comfortable . TF going at goal . Per RN pt is more alert and awake   UE edema B/L 2+ .   3/17 : clinically better wake , talktive ,edema in UE better on TF     3/18 : on RA , alert awake ,oriented . No LE edema . Appear comfortable   3/19: Patient doing rather well, off of tube feeding patient started getting puréed food discussed the case with the son was at the bedside overall condition rather optimistic improving.  3/20: Patient was seen and evaluated in her room, no new problems she was having lunch without any complications of gag or other shortness of breath.    3/21 : seen in her room on RA , alert oriented , pleasant , continue to look though profoundly weak with poor muscle mass     3/22 : comfortable , stable vitals ,     History of Present Ilness:      Patient has h/o preserved left ventricular systolic function (CLEMENCIA  1/16/5747), stress test with no significant reversibility 6/16/2015, paroxysmal atrial fibrillation (on Eliquis), chronic left bundle branch block, HTN, HLD,  s/p kidney transplant,CKD, H/o DVT, s/p IVC on AC now, PVD, s/p thyroidectomy for cancer, gout, obesity     She was recently treated in hospitalfrom 2/25/2020 to 3/7/2022 because of acute hypoxic respiratory failure likely due to aspiration pneumonia with positive blood culture and acute metabolic encephalopathy     Patient was brought from [address] [place] [address] and rehab facility to ED of [address] [place] [address] on 3/9/22 due to new mental status changes.     Nephrology asked to see for CECILY /CKD Pt has hx of cadaveric kidney transplant 7 years back   Her baseline cr has been 1.2-1.6 with a lot of fluctuation in contest of dehydration . Pt seen in her room , she is very weak and deconditoned to my eyes she has lost a lot of weight .    She has a hassan in place   She has poor po intake   She is euvolemic to mildly hypovolemic       Allergies:  Macrodantin [nitrofurantoin macrocrystal] and Sulfa antibiotics    Current Medications:    0.9 % sodium chloride infusion, Continuous  acyclovir (ZOVIRAX) 350 mg in dextrose 5 % 50 mL IVPB, Q12H  simethicone (MYLICON) chewable tablet 160 mg, TID PRN  cycloSPORINE modified (NEORAL) capsule 100 mg, BID  docusate sodium (COLACE) capsule 100 mg, BID  mycophenolate (CELLCEPT) capsule 500 mg, BID  sodium bicarbonate tablet 650 mg, Daily  0.9 % sodium chloride infusion, PRN  venlafaxine (EFFEXOR) tablet 37.5 mg, BID WC  epoetin casey-epbx (RETACRIT) injection 2,000 Units, Q7 Days   And  epoetin casey-epbx (RETACRIT) injection 3,000 Units, Q7 Days  magnesium oxide (MAG-OX) tablet 400 mg, Daily  potassium chloride (KLOR-CON M) extended release tablet 20 mEq, Once  ipratropium-albuterol (DUONEB) nebulizer solution 1 ampule, Q4H  midodrine (PROAMATINE) tablet 5 mg, BID PRN  lidocaine 1 % injection 5 mL, Once  sodium chloride flush 0.9 % injection 5-40 mL, 2 times per day  sodium chloride flush 0.9 % injection 5-40 mL, PRN  0.9 % sodium chloride infusion, PRN  heparin flush 100 UNIT/ML injection 100 Units, 2 times per day  heparin flush 100 UNIT/ML injection 100 Units, PRN  [Held by provider] metoprolol tartrate (LOPRESSOR) tablet 12.5 mg, BID  Fidaxomicin (DIFICID) tablet 200 mg, BID  levoFLOXacin (LEVAQUIN) 750 MG/150ML infusion 750 mg, Q48H  apixaban (ELIQUIS) tablet 5 mg, BID  ondansetron (ZOFRAN-ODT) disintegrating tablet 4 mg, Q8H PRN  atorvastatin (LIPITOR) tablet 10 mg, Nightly  cloZAPine (CLOZARIL) tablet 50 mg, Nightly  levothyroxine (SYNTHROID) tablet 50 mcg, Daily  pantoprazole (PROTONIX) tablet 40 mg, QAM AC  predniSONE (DELTASONE) tablet 5 mg, Daily  allopurinol (ZYLOPRIM) tablet 927 mg, Daily  folic acid (FOLVITE) tablet 1 mg, Daily        Review of Systems:   Constitutional: weakness   Eyes: no eye pain , no itching , no drainage  Ears, nose, mouth, throat, and face: no ear ,nose pain , hearing is ok ,no nasal drainage   Respiratory: no sob ,no cough ,no wheezing . Cardiovascular: no chest pain , no palpitation ,no sob . Gastrointestinal: no nausea, vomiting , constipation , no abdominal pain . Genitourinary:no urinary retention , no burning , dysuria . No polyuria   Hematologic/lymphatic: no bleeding , no cougulation issues . Musculoskeletal:no joint pain , no swelling . Neurological: no headaches ,no weakness , no numbness . Endocrine: no thirst , no weight issues . Physical exam:   Vital signs /62   Pulse 97   Temp 97.5 °F (36.4 °C) (Temporal)   Resp 16   Ht 5' 2\" (1.575 m)   Wt 226 lb (102.5 kg)   SpO2 94%   BMI 41.34 kg/m²   Gen : In NAD , appears stated age . Head : NC/AT, EOMI, sclera and conjunctive are clear and anicteric. Mucous membranes dry  Neck : supple , no thyromegaly noted . Trachea midline  CV : regular rhythm, normal S1 and S2, No M/R/G . Lungs: CTAB , no wheezing , good air entry in all fields, though poor inspiratory effort  Abd : soft , NT , BS +   Skin : soft, dry . Neuro : CN  II-XII grossly intact , no focal neurologic deficit .    Psych : affect flat, minimally responsive, mostly to tactile stimulation, does not answer questions or follow commands    Data:   Labs:  CBC with Differential:    Lab Results   Component Value Date    WBC 9.0 03/23/2022    RBC 2.85 03/23/2022    HGB 8.9 03/23/2022    HCT 29.1 03/23/2022     03/23/2022    .1 03/23/2022    MCH 31.2 03/23/2022    MCHC 30.6 03/23/2022    RDW 18.2 03/23/2022    NRBC 0.9 03/22/2022    METASPCT 1.7 03/22/2022    LYMPHOPCT 12.8 03/23/2022 MONOPCT 8.0 03/23/2022    MYELOPCT 1.7 03/22/2022    BASOPCT 0.4 03/23/2022    MONOSABS 0.72 03/23/2022    LYMPHSABS 1.15 03/23/2022    EOSABS 0.22 03/23/2022    BASOSABS 0.04 03/23/2022     CMP:    Lab Results   Component Value Date     03/23/2022    K 4.2 03/23/2022    K 4.7 03/09/2022     03/23/2022    CO2 24 03/23/2022    BUN 23 03/23/2022    CREATININE 1.7 03/23/2022    GFRAA 36 03/23/2022    LABGLOM 30 03/23/2022    GLUCOSE 101 03/23/2022    PROT 4.1 03/23/2022    LABALBU 2.5 03/23/2022    CALCIUM 9.0 03/23/2022    BILITOT 0.4 03/23/2022    ALKPHOS 45 03/23/2022    AST 28 03/23/2022    ALT 27 03/23/2022     Ionized Calcium:  No results found for: IONCA  Magnesium:    Lab Results   Component Value Date    MG 1.7 03/16/2022     Phosphorus:    Lab Results   Component Value Date    PHOS 3.0 03/16/2022     U/A:    Lab Results   Component Value Date    COLORU Yellow 03/09/2022    PHUR 5.0 03/09/2022    WBCUA 1-3 03/09/2022    RBCUA NONE 03/09/2022    RBCUA 10-20 03/12/2014    YEAST Present 04/09/2021    BACTERIA MODERATE 03/09/2022    CLARITYU Clear 03/09/2022    SPECGRAV 1.025 03/09/2022    LEUKOCYTESUR Negative 03/09/2022    UROBILINOGEN 0.2 03/09/2022    BILIRUBINUR Negative 03/09/2022    BLOODU Negative 03/09/2022    GLUCOSEU Negative 03/09/2022     Microalbumen/Creatinine ratio:  No components found for: RUCREAT  Iron Saturation:  No components found for: PERCENTFE  TIBC:    Lab Results   Component Value Date    TIBC 92 03/17/2022     FERRITIN:    Lab Results   Component Value Date    FERRITIN 745 03/17/2022        Imaging:  XR CHEST ABDOMEN NG PLACEMENT   Final Result   The enteric tube terminates in the stomach. XR CHEST PORTABLE   Final Result   Increased opacities in left lung base related to increasing subsegmental   atelectasis or pleural effusion. XR ABDOMEN FOR NG/OG/NE TUBE PLACEMENT   Final Result   Satisfactory position of the enteric tube.          XR CHEST PORTABLE Final Result   Addendum 1 of 1   ADDENDUM:   The floor reports slow nasogastric tube output. Based on positioning on   plain film, recommend further advancement. Final   1. Possible right pleural effusion and right basilar atelectasis. 2. Cardiomegaly without pulmonary venous hypertension. 3. Possible hiatal hernia. XR ABDOMEN FOR NG/OG/NE TUBE PLACEMENT   Final Result   Enteric tube tip in the fundus of the stomach. US RETROPERITONEAL COMPLETE   Final Result   No hydronephrosis of the transplant kidney. The bladder is decompressed by a Chin catheter. IR FLUORO GUIDED CVA DEVICE PLMT/REPLACE/REMOVAL   Final Result   Successful placement of a dual lumen power PICC line. The tip of the PICC   line catheter was placed within the distal right subclavian vein. Marnell Marcus IMPRESSION:   Successful ultrasound and fluoroscopy guided PICC placement         IR ULTRASOUND GUIDANCE VASCULAR ACCESS   Final Result   Successful placement of a dual lumen power PICC line. The tip of the PICC   line catheter was placed within the distal right subclavian vein. Marnell Marcus IMPRESSION:   Successful ultrasound and fluoroscopy guided PICC placement         CT HEAD WO CONTRAST   Final Result   No acute intracranial abnormality or hemorrhage. Bilateral mastoiditis and right sphenoid sinusitis. XR SHOULDER LEFT (MIN 2 VIEWS)   Final Result   No fracture. Increased separation of the humeral head from the osseous   glenoid and AC joint, which could represent subluxation. Otherwise, a large   synovial effusion, hematoma, or mass cannot be excluded. Clinical   correlation is recommended. CT scan of the left shoulder can be performed   for further evaluation. Cardiomegaly. XR HUMERUS LEFT (MIN 2 VIEWS)   Final Result   No fracture or dislocation. Increased separation of the humeral head from   the osseous glenoid and AC joint, which could represent subluxation.   A large synovial effusion, hematoma, or mass cannot be excluded. CT scan of the left   shoulder can be considered for further evaluation. XR CHEST PORTABLE   Final Result   1. Right perihilar discoid atelectasis. 2. There are no findings of failure or pneumonia. Assessment    -Acute kidney injury : cr today is 1.7 up from 1.3  .will recheck urine lytes start  NS at 65 cc/hr     -Chronic kidney disease with hx of DDKT : baseline cr 1.1-1.5 .     -Hyperchrloremic acidosis :better . Continue nahco3 tablets     -Hx of  donor kidney transplant : continue current immunosuppressent     -Immunosuppression host :  continue current immunosuppressent     -Anaemia of chronic disease : some of the anaemia is dilutional . continue retacrit 5000 u sc q week     -Edema mainly in UE : better . With Na going up hold lasix today .      -Hypo Magensiemia : replace     -severe deconditioning : PT/OT         Electronically signed by Dash Rodarte MD on 3/23/2022

## 2022-03-24 NOTE — DISCHARGE SUMMARY
[address] [facility]                               DISCHARGE SUMMARY    PATIENT NAME: Aries Kilgore              :        1955  MED REC NO:   [id number]                            ROOM:       0360  ACCOUNT NO:   [de-identified]                           ADMIT DATE: 2022  PROVIDER:     Ana Alvarez MD                  DISCHARGE DATE:  2022      DISCHARGE DIAGNOSES:  1. Altered mental status and respiratory failure. 2.  Aspiration pneumonia. 3.  Diarrhea, possibly C. diff colitis. 4.  Acute-on-chronic renal failure. 5.  Hypotension. 6.  Hyperlipidemia. 7.  Bipolar disorder. 8.  Aseptic encephalitis/meningitis. 9.  Chronic renal failure status post renal transplant in 2015 in  [facility] and baseline creatinine around 1.3 and 1.5. 10.  Recent left mid and distal femoral vein and popliteal vein  thrombosis. 11.  IVC filter placement, last admission. HOSPITAL COURSE:  This is a 52-year-old white lady with significant past  medical history as stated above, who was recently discharged from the  hospital with aspiration pneumonia and she was given Augmentin for 10  days. In the rehab facility and the nursing home, she did have some  altered mental status and decreased oxygenation. Her oxygenation was 88  on room air and she was sent to the emergency room and was placed on  oxygen. She was saturating well on nasal cannula, but also in the  emergency room she was found to have leukocytosis of 24,000, she was  then admitted. Infectious Disease and Pulmonology were consulted and  she was placed on Zosyn 3.375 q.8 and Dr. Valarie Hays was consulted for her  chronic renal failure. The patient started having delirium and  somnolence, and she was found unresponsive for a week to 10 days. An NG  tube was placed and she was started on tube feeding.   She had diarrhea,  which on last admission, she had some colitis. A stool culture x2 did  not reveal any C. diff, but because the patient is immunocompromized,  and Dr. Eddy Guadarrama placed her on Dificid and vancomycin oral.  Her diarrhea  resolved. She was also placed on acyclovir IV for possible encephalitis  and she was unresponsive for a long time. Eventually, the patient  started gaining consciousness and she was alert and started oral intake. She had speech evaluation, showed that she goes on soft bite-size diet  with thin liquid. The beta-D-glucan was negative; otherwise, no  indication for fungal infection. She did have 1 unit of transfusion  during her hospitalization. Her creatinine worsened during her  hospitalization to 2.6. She was placed on IV fluids and was given _____  x3 and Lasix. She responded well to that and her kidney function  started to getting better. Upon discharge, her kidney function was 1.7. She did have a PICC line which was removed prior to discharge. After  the Zosyn was stopped, she was placed on Levaquin IV for 10 days and she  was placed on Dificid for 10 days and the acyclovir was switched from IV  to oral upon discharge for another month and vancomycin will be tapered  orally. The patient was alert and oriented x3. She was in stable  condition, but very weak physically. She was then discharged to [facility] for if she needs extensive rehab.     DISCHARGE MEDICATIONS:  She was discharged on the following medications;  midodrine 5 mg twice a day as needed for blood pressure less than  _____systolic, simethicone 073 mg three times a day as needed for  bloating, sodium bicarb 650 daily, magnesium oxide 400 daily, Lopressor  12.5 twice a day hold for systolic blood pressure less than 100, epoetin  2000 units subcu q.7 days, acyclovir 400 mg twice a day for one more  month, to be discontinued on 04/20/2022, vancomycin tapering dose until  05/23/2022, Protonix 40 mg daily, Eliquis 5 mg twice a day, Lipitor 10  mg daily, DuoNeb nebulizer every six hours as needed, Pulmicort 0.5  twice a day, Zofran 4 mg every 8 hours as needed, Pristiq 50 mg daily,  cyclosporine 100 mg twice a day, prednisone 5 mg daily, Colace 100 mg  twice a day, CellCept 500 mg twice a day, and Synthroid 50 mcg daily. The patient is to follow up with myself in the office after discharge  from [facility].         Machelle Fox MD    D: 03/23/2022 20:48:13       T: 03/24/2022 4:38:38     [email or web address]  Job#: [id number]     Doc#: [id number]    CC:

## 2022-04-21 ENCOUNTER — HOSPITAL ENCOUNTER (INPATIENT)
Age: 67
LOS: 5 days | Discharge: NURSING, REHAB OR LONG-TERM CARE | DRG: 698 | End: 2022-04-26
Attending: EMERGENCY MEDICINE | Admitting: INTERNAL MEDICINE
Payer: MEDICARE

## 2022-04-21 ENCOUNTER — APPOINTMENT (OUTPATIENT)
Dept: GENERAL RADIOLOGY | Age: 67
DRG: 698 | End: 2022-04-21
Payer: MEDICARE

## 2022-04-21 DIAGNOSIS — N30.01 ACUTE CYSTITIS WITH HEMATURIA: ICD-10-CM

## 2022-04-21 DIAGNOSIS — A41.9 SEPSIS WITHOUT ACUTE ORGAN DYSFUNCTION, DUE TO UNSPECIFIED ORGANISM (HCC): Primary | ICD-10-CM

## 2022-04-21 DIAGNOSIS — N18.9 CHRONIC RENAL IMPAIRMENT, UNSPECIFIED CKD STAGE: ICD-10-CM

## 2022-04-21 DIAGNOSIS — Z94.0 KIDNEY TRANSPLANT RECIPIENT: ICD-10-CM

## 2022-04-21 LAB
ALBUMIN SERPL-MCNC: 3.6 G/DL (ref 3.5–5.2)
ALP BLD-CCNC: 66 U/L (ref 35–104)
ALT SERPL-CCNC: 7 U/L (ref 0–32)
ANION GAP SERPL CALCULATED.3IONS-SCNC: 16 MMOL/L (ref 7–16)
ANISOCYTOSIS: ABNORMAL
AST SERPL-CCNC: 9 U/L (ref 0–31)
B.E.: 1.2 MMOL/L (ref -3–3)
BACTERIA: ABNORMAL /HPF
BASOPHILS ABSOLUTE: 0 E9/L (ref 0–0.2)
BASOPHILS RELATIVE PERCENT: 0 % (ref 0–2)
BILIRUB SERPL-MCNC: 0.7 MG/DL (ref 0–1.2)
BILIRUBIN URINE: NEGATIVE
BLOOD, URINE: ABNORMAL
BUN BLDV-MCNC: 30 MG/DL (ref 6–23)
CALCIUM SERPL-MCNC: 10.6 MG/DL (ref 8.6–10.2)
CHLORIDE BLD-SCNC: 100 MMOL/L (ref 98–107)
CLARITY: ABNORMAL
CO2: 24 MMOL/L (ref 22–29)
COLOR: YELLOW
CORTISOL TOTAL: 24.44 MCG/DL (ref 2.68–18.4)
CREAT SERPL-MCNC: 1.6 MG/DL (ref 0.5–1)
DEVICE: ABNORMAL
EOSINOPHILS ABSOLUTE: 0.09 E9/L (ref 0.05–0.5)
EOSINOPHILS RELATIVE PERCENT: 1 % (ref 0–6)
EPITHELIAL CELLS, UA: ABNORMAL /HPF
GFR AFRICAN AMERICAN: 39
GFR NON-AFRICAN AMERICAN: 32 ML/MIN/1.73
GLUCOSE BLD-MCNC: 124 MG/DL (ref 74–99)
GLUCOSE URINE: NEGATIVE MG/DL
HCO3: 26.4 MMOL/L (ref 22–26)
HCT VFR BLD CALC: 36.1 % (ref 34–48)
HEMOGLOBIN: 11 G/DL (ref 11.5–15.5)
INFLUENZA A BY PCR: NOT DETECTED
INFLUENZA B BY PCR: NOT DETECTED
KETONES, URINE: NEGATIVE MG/DL
LACTIC ACID, SEPSIS: 0.7 MMOL/L (ref 0.5–1.9)
LACTIC ACID, SEPSIS: 0.7 MMOL/L (ref 0.5–1.9)
LEUKOCYTE ESTERASE, URINE: ABNORMAL
LYMPHOCYTES ABSOLUTE: 0.69 E9/L (ref 1.5–4)
LYMPHOCYTES RELATIVE PERCENT: 8 % (ref 20–42)
MCH RBC QN AUTO: 33.5 PG (ref 26–35)
MCHC RBC AUTO-ENTMCNC: 30.5 % (ref 32–34.5)
MCV RBC AUTO: 110.1 FL (ref 80–99.9)
MONOCYTES ABSOLUTE: 0.17 E9/L (ref 0.1–0.95)
MONOCYTES RELATIVE PERCENT: 2 % (ref 2–12)
NEUTROPHILS ABSOLUTE: 7.65 E9/L (ref 1.8–7.3)
NEUTROPHILS RELATIVE PERCENT: 89 % (ref 43–80)
NITRITE, URINE: POSITIVE
O2 SATURATION: 96.9 % (ref 92–98.5)
OPERATOR ID: 8219
PCO2 37: 43.7 MMHG (ref 35–45)
PDW BLD-RTO: 19.5 FL (ref 11.5–15)
PH 37: 7.39 (ref 7.35–7.45)
PH UA: 6.5 (ref 5–9)
PLATELET # BLD: 168 E9/L (ref 130–450)
PMV BLD AUTO: 12.3 FL (ref 7–12)
PO2 37: 91.3 MMHG (ref 60–80)
POC SOURCE: ABNORMAL
POTASSIUM REFLEX MAGNESIUM: 4.3 MMOL/L (ref 3.5–5)
PROTEIN UA: ABNORMAL MG/DL
RBC # BLD: 3.28 E12/L (ref 3.5–5.5)
RBC UA: ABNORMAL /HPF (ref 0–2)
SARS-COV-2, NAAT: NOT DETECTED
SODIUM BLD-SCNC: 140 MMOL/L (ref 132–146)
SPECIFIC GRAVITY UA: 1.01 (ref 1–1.03)
TOTAL PROTEIN: 6.5 G/DL (ref 6.4–8.3)
TOXIC GRANULATION: ABNORMAL
TROPONIN, HIGH SENSITIVITY: 84 NG/L (ref 0–9)
TROPONIN, HIGH SENSITIVITY: 85 NG/L (ref 0–9)
UROBILINOGEN, URINE: 0.2 E.U./DL
WBC # BLD: 8.6 E9/L (ref 4.5–11.5)
WBC UA: >20 /HPF (ref 0–5)

## 2022-04-21 PROCEDURE — 6360000002 HC RX W HCPCS: Performed by: STUDENT IN AN ORGANIZED HEALTH CARE EDUCATION/TRAINING PROGRAM

## 2022-04-21 PROCEDURE — 99285 EMERGENCY DEPT VISIT HI MDM: CPT

## 2022-04-21 PROCEDURE — 2060000000 HC ICU INTERMEDIATE R&B

## 2022-04-21 PROCEDURE — 93005 ELECTROCARDIOGRAM TRACING: CPT | Performed by: STUDENT IN AN ORGANIZED HEALTH CARE EDUCATION/TRAINING PROGRAM

## 2022-04-21 PROCEDURE — 82803 BLOOD GASES ANY COMBINATION: CPT

## 2022-04-21 PROCEDURE — 85025 COMPLETE CBC W/AUTO DIFF WBC: CPT

## 2022-04-21 PROCEDURE — 71045 X-RAY EXAM CHEST 1 VIEW: CPT

## 2022-04-21 PROCEDURE — 2580000003 HC RX 258: Performed by: STUDENT IN AN ORGANIZED HEALTH CARE EDUCATION/TRAINING PROGRAM

## 2022-04-21 PROCEDURE — 87502 INFLUENZA DNA AMP PROBE: CPT

## 2022-04-21 PROCEDURE — 81001 URINALYSIS AUTO W/SCOPE: CPT

## 2022-04-21 PROCEDURE — 36415 COLL VENOUS BLD VENIPUNCTURE: CPT

## 2022-04-21 PROCEDURE — 96365 THER/PROPH/DIAG IV INF INIT: CPT

## 2022-04-21 PROCEDURE — 87040 BLOOD CULTURE FOR BACTERIA: CPT

## 2022-04-21 PROCEDURE — 96361 HYDRATE IV INFUSION ADD-ON: CPT

## 2022-04-21 PROCEDURE — 87635 SARS-COV-2 COVID-19 AMP PRB: CPT

## 2022-04-21 PROCEDURE — 87088 URINE BACTERIA CULTURE: CPT

## 2022-04-21 PROCEDURE — 82533 TOTAL CORTISOL: CPT

## 2022-04-21 PROCEDURE — 84484 ASSAY OF TROPONIN QUANT: CPT

## 2022-04-21 PROCEDURE — 83605 ASSAY OF LACTIC ACID: CPT

## 2022-04-21 PROCEDURE — 80053 COMPREHEN METABOLIC PANEL: CPT

## 2022-04-21 RX ORDER — LEVOTHYROXINE SODIUM 0.05 MG/1
50 TABLET ORAL DAILY
Status: DISCONTINUED | OUTPATIENT
Start: 2022-04-22 | End: 2022-04-26 | Stop reason: HOSPADM

## 2022-04-21 RX ORDER — VITAMIN B COMPLEX
1000 TABLET ORAL DAILY
Status: DISCONTINUED | OUTPATIENT
Start: 2022-04-22 | End: 2022-04-26 | Stop reason: HOSPADM

## 2022-04-21 RX ORDER — CYCLOSPORINE 25 MG/1
100 CAPSULE, GELATIN COATED ORAL 2 TIMES DAILY
Status: DISCONTINUED | OUTPATIENT
Start: 2022-04-21 | End: 2022-04-22 | Stop reason: SDUPTHER

## 2022-04-21 RX ORDER — MIDODRINE HYDROCHLORIDE 5 MG/1
5 TABLET ORAL 2 TIMES DAILY PRN
Status: DISCONTINUED | OUTPATIENT
Start: 2022-04-21 | End: 2022-04-26 | Stop reason: HOSPADM

## 2022-04-21 RX ORDER — SODIUM CHLORIDE 9 MG/ML
INJECTION, SOLUTION INTRAVENOUS CONTINUOUS
Status: DISCONTINUED | OUTPATIENT
Start: 2022-04-21 | End: 2022-04-24

## 2022-04-21 RX ORDER — DESVENLAFAXINE 50 MG/1
50 TABLET, EXTENDED RELEASE ORAL EVERY EVENING
Status: DISCONTINUED | OUTPATIENT
Start: 2022-04-21 | End: 2022-04-26 | Stop reason: HOSPADM

## 2022-04-21 RX ORDER — ACETAMINOPHEN 325 MG/1
650 TABLET ORAL EVERY 6 HOURS PRN
COMMUNITY

## 2022-04-21 RX ORDER — DOCUSATE SODIUM 100 MG/1
100 CAPSULE, LIQUID FILLED ORAL 2 TIMES DAILY PRN
Status: DISCONTINUED | OUTPATIENT
Start: 2022-04-21 | End: 2022-04-26 | Stop reason: HOSPADM

## 2022-04-21 RX ORDER — FOLIC ACID 1 MG/1
1 TABLET ORAL DAILY
Status: DISCONTINUED | OUTPATIENT
Start: 2022-04-22 | End: 2022-04-26 | Stop reason: HOSPADM

## 2022-04-21 RX ORDER — CLOZAPINE 50 MG/1
50 TABLET ORAL NIGHTLY
COMMUNITY

## 2022-04-21 RX ORDER — SODIUM BICARBONATE 650 MG/1
650 TABLET ORAL DAILY
Status: DISCONTINUED | OUTPATIENT
Start: 2022-04-22 | End: 2022-04-26 | Stop reason: HOSPADM

## 2022-04-21 RX ORDER — NYSTATIN 100000 [USP'U]/G
1 POWDER TOPICAL 3 TIMES DAILY
Status: ON HOLD | COMMUNITY
End: 2022-05-24 | Stop reason: HOSPADM

## 2022-04-21 RX ORDER — ALLOPURINOL 100 MG/1
100 TABLET ORAL DAILY
Status: DISCONTINUED | OUTPATIENT
Start: 2022-04-22 | End: 2022-04-26 | Stop reason: HOSPADM

## 2022-04-21 RX ORDER — ONDANSETRON 4 MG/1
4 TABLET, FILM COATED ORAL EVERY 8 HOURS PRN
COMMUNITY
End: 2022-05-17 | Stop reason: ALTCHOICE

## 2022-04-21 RX ORDER — ATORVASTATIN CALCIUM 10 MG/1
10 TABLET, FILM COATED ORAL NIGHTLY
Status: DISCONTINUED | OUTPATIENT
Start: 2022-04-21 | End: 2022-04-26 | Stop reason: HOSPADM

## 2022-04-21 RX ORDER — EPOETIN ALFA-EPBX 2000 [IU]/ML
2000 INJECTION, SOLUTION INTRAVENOUS; SUBCUTANEOUS SEE ADMIN INSTRUCTIONS
COMMUNITY
End: 2022-05-17 | Stop reason: ALTCHOICE

## 2022-04-21 RX ORDER — SIMETHICONE 80 MG
160 TABLET,CHEWABLE ORAL 3 TIMES DAILY PRN
Status: DISCONTINUED | OUTPATIENT
Start: 2022-04-21 | End: 2022-04-26 | Stop reason: HOSPADM

## 2022-04-21 RX ORDER — ACYCLOVIR 400 MG/1
400 TABLET ORAL 2 TIMES DAILY
Status: DISCONTINUED | OUTPATIENT
Start: 2022-04-21 | End: 2022-04-26 | Stop reason: HOSPADM

## 2022-04-21 RX ORDER — CLOZAPINE 25 MG/1
50 TABLET ORAL NIGHTLY
Status: DISCONTINUED | OUTPATIENT
Start: 2022-04-21 | End: 2022-04-26 | Stop reason: HOSPADM

## 2022-04-21 RX ORDER — PANTOPRAZOLE SODIUM 40 MG/1
40 TABLET, DELAYED RELEASE ORAL
Status: DISCONTINUED | OUTPATIENT
Start: 2022-04-22 | End: 2022-04-26 | Stop reason: HOSPADM

## 2022-04-21 RX ORDER — ACETAMINOPHEN 325 MG/1
650 TABLET ORAL EVERY 4 HOURS PRN
Status: DISCONTINUED | OUTPATIENT
Start: 2022-04-21 | End: 2022-04-26 | Stop reason: HOSPADM

## 2022-04-21 RX ORDER — DIPHENHYDRAMINE HCL 25 MG
25 TABLET ORAL EVERY 6 HOURS PRN
COMMUNITY
End: 2022-09-15

## 2022-04-21 RX ORDER — ONDANSETRON 4 MG/1
4 TABLET, FILM COATED ORAL EVERY 8 HOURS PRN
Status: DISCONTINUED | OUTPATIENT
Start: 2022-04-21 | End: 2022-04-26 | Stop reason: HOSPADM

## 2022-04-21 RX ORDER — 0.9 % SODIUM CHLORIDE 0.9 %
30 INTRAVENOUS SOLUTION INTRAVENOUS ONCE
Status: COMPLETED | OUTPATIENT
Start: 2022-04-21 | End: 2022-04-21

## 2022-04-21 RX ORDER — IPRATROPIUM BROMIDE AND ALBUTEROL SULFATE 2.5; .5 MG/3ML; MG/3ML
3 SOLUTION RESPIRATORY (INHALATION) EVERY 6 HOURS PRN
Status: DISCONTINUED | OUTPATIENT
Start: 2022-04-21 | End: 2022-04-26 | Stop reason: HOSPADM

## 2022-04-21 RX ORDER — HYDROCHLOROTHIAZIDE 25 MG/1
25 TABLET ORAL DAILY
Status: ON HOLD | COMMUNITY
End: 2022-04-26 | Stop reason: HOSPADM

## 2022-04-21 RX ORDER — VANCOMYCIN HYDROCHLORIDE 125 MG/1
125 CAPSULE ORAL 2 TIMES DAILY
COMMUNITY
Start: 2022-04-14 | End: 2022-04-24

## 2022-04-21 RX ORDER — ACYCLOVIR 400 MG/1
400 TABLET ORAL 2 TIMES DAILY
COMMUNITY
End: 2022-06-22 | Stop reason: SDUPTHER

## 2022-04-21 RX ORDER — PREDNISONE 1 MG/1
5 TABLET ORAL EVERY MORNING
Status: DISCONTINUED | OUTPATIENT
Start: 2022-04-22 | End: 2022-04-26 | Stop reason: HOSPADM

## 2022-04-21 RX ORDER — MYCOPHENOLATE MOFETIL 500 MG/1
1000 TABLET ORAL 2 TIMES DAILY
Status: DISCONTINUED | OUTPATIENT
Start: 2022-04-21 | End: 2022-04-22 | Stop reason: SDUPTHER

## 2022-04-21 RX ADMIN — SODIUM CHLORIDE 3075 ML: 9 INJECTION, SOLUTION INTRAVENOUS at 18:50

## 2022-04-21 RX ADMIN — PIPERACILLIN AND TAZOBACTAM 4500 MG: 4; .5 INJECTION, POWDER, FOR SOLUTION INTRAVENOUS at 20:10

## 2022-04-21 RX ADMIN — VANCOMYCIN HYDROCHLORIDE 2000 MG: 10 INJECTION, POWDER, LYOPHILIZED, FOR SOLUTION INTRAVENOUS at 21:52

## 2022-04-21 ASSESSMENT — PAIN - FUNCTIONAL ASSESSMENT: PAIN_FUNCTIONAL_ASSESSMENT: NONE - DENIES PAIN

## 2022-04-21 ASSESSMENT — PAIN SCALES - GENERAL: PAINLEVEL_OUTOF10: 0

## 2022-04-22 LAB
ALBUMIN SERPL-MCNC: 2.9 G/DL (ref 3.5–5.2)
ALP BLD-CCNC: 50 U/L (ref 35–104)
ALT SERPL-CCNC: 5 U/L (ref 0–32)
ANION GAP SERPL CALCULATED.3IONS-SCNC: 12 MMOL/L (ref 7–16)
AST SERPL-CCNC: 8 U/L (ref 0–31)
BASOPHILS ABSOLUTE: 0.07 E9/L (ref 0–0.2)
BASOPHILS RELATIVE PERCENT: 0.9 % (ref 0–2)
BILIRUB SERPL-MCNC: 0.6 MG/DL (ref 0–1.2)
BUN BLDV-MCNC: 32 MG/DL (ref 6–23)
CALCIUM SERPL-MCNC: 9.8 MG/DL (ref 8.6–10.2)
CHLORIDE BLD-SCNC: 104 MMOL/L (ref 98–107)
CO2: 25 MMOL/L (ref 22–29)
CREAT SERPL-MCNC: 1.7 MG/DL (ref 0.5–1)
EKG ATRIAL RATE: 117 BPM
EKG P AXIS: 57 DEGREES
EKG P-R INTERVAL: 158 MS
EKG Q-T INTERVAL: 344 MS
EKG QRS DURATION: 138 MS
EKG QTC CALCULATION (BAZETT): 479 MS
EKG R AXIS: -48 DEGREES
EKG T AXIS: 105 DEGREES
EKG VENTRICULAR RATE: 117 BPM
EOSINOPHILS ABSOLUTE: 0 E9/L (ref 0.05–0.5)
EOSINOPHILS RELATIVE PERCENT: 0.5 % (ref 0–6)
GFR AFRICAN AMERICAN: 36
GFR NON-AFRICAN AMERICAN: 30 ML/MIN/1.73
GLUCOSE BLD-MCNC: 119 MG/DL (ref 74–99)
HCT VFR BLD CALC: 29.2 % (ref 34–48)
HEMOGLOBIN: 8.8 G/DL (ref 11.5–15.5)
LYMPHOCYTES ABSOLUTE: 0.33 E9/L (ref 1.5–4)
LYMPHOCYTES RELATIVE PERCENT: 3.5 % (ref 20–42)
MCH RBC QN AUTO: 33.3 PG (ref 26–35)
MCHC RBC AUTO-ENTMCNC: 30.1 % (ref 32–34.5)
MCV RBC AUTO: 110.6 FL (ref 80–99.9)
MONOCYTES ABSOLUTE: 0.41 E9/L (ref 0.1–0.95)
MONOCYTES RELATIVE PERCENT: 5.2 % (ref 2–12)
NEUTROPHILS ABSOLUTE: 7.38 E9/L (ref 1.8–7.3)
NEUTROPHILS RELATIVE PERCENT: 90.4 % (ref 43–80)
PDW BLD-RTO: 19.6 FL (ref 11.5–15)
PLATELET # BLD: 147 E9/L (ref 130–450)
PMV BLD AUTO: 12 FL (ref 7–12)
POTASSIUM SERPL-SCNC: 4 MMOL/L (ref 3.5–5)
RBC # BLD: 2.64 E12/L (ref 3.5–5.5)
SODIUM BLD-SCNC: 141 MMOL/L (ref 132–146)
TOTAL PROTEIN: 5.4 G/DL (ref 6.4–8.3)
WBC # BLD: 8.2 E9/L (ref 4.5–11.5)

## 2022-04-22 PROCEDURE — 6370000000 HC RX 637 (ALT 250 FOR IP): Performed by: INTERNAL MEDICINE

## 2022-04-22 PROCEDURE — 80053 COMPREHEN METABOLIC PANEL: CPT

## 2022-04-22 PROCEDURE — 2580000003 HC RX 258: Performed by: INTERNAL MEDICINE

## 2022-04-22 PROCEDURE — 2580000003 HC RX 258: Performed by: SPECIALIST

## 2022-04-22 PROCEDURE — 97530 THERAPEUTIC ACTIVITIES: CPT

## 2022-04-22 PROCEDURE — 36592 COLLECT BLOOD FROM PICC: CPT

## 2022-04-22 PROCEDURE — 36415 COLL VENOUS BLD VENIPUNCTURE: CPT

## 2022-04-22 PROCEDURE — 97165 OT EVAL LOW COMPLEX 30 MIN: CPT

## 2022-04-22 PROCEDURE — 2060000000 HC ICU INTERMEDIATE R&B

## 2022-04-22 PROCEDURE — 6370000000 HC RX 637 (ALT 250 FOR IP): Performed by: SPECIALIST

## 2022-04-22 PROCEDURE — 6360000002 HC RX W HCPCS: Performed by: INTERNAL MEDICINE

## 2022-04-22 PROCEDURE — 2500000003 HC RX 250 WO HCPCS: Performed by: INTERNAL MEDICINE

## 2022-04-22 PROCEDURE — 6360000002 HC RX W HCPCS: Performed by: SPECIALIST

## 2022-04-22 PROCEDURE — 97530 THERAPEUTIC ACTIVITIES: CPT | Performed by: PHYSICAL THERAPIST

## 2022-04-22 PROCEDURE — 85025 COMPLETE CBC W/AUTO DIFF WBC: CPT

## 2022-04-22 PROCEDURE — 97161 PT EVAL LOW COMPLEX 20 MIN: CPT | Performed by: PHYSICAL THERAPIST

## 2022-04-22 RX ORDER — LACTOBACILLUS RHAMNOSUS GG 10B CELL
1 CAPSULE ORAL EVERY 12 HOURS
Status: DISCONTINUED | OUTPATIENT
Start: 2022-04-22 | End: 2022-04-26 | Stop reason: HOSPADM

## 2022-04-22 RX ORDER — CYCLOSPORINE 25 MG/1
100 CAPSULE, GELATIN COATED ORAL 2 TIMES DAILY
Status: DISCONTINUED | OUTPATIENT
Start: 2022-04-22 | End: 2022-04-26 | Stop reason: HOSPADM

## 2022-04-22 RX ORDER — MYCOPHENOLATE MOFETIL 250 MG/1
1000 CAPSULE ORAL 2 TIMES DAILY
Status: DISCONTINUED | OUTPATIENT
Start: 2022-04-22 | End: 2022-04-26 | Stop reason: HOSPADM

## 2022-04-22 RX ADMIN — ONDANSETRON HYDROCHLORIDE 4 MG: 4 TABLET, FILM COATED ORAL at 00:20

## 2022-04-22 RX ADMIN — Medication 1 CAPSULE: at 14:00

## 2022-04-22 RX ADMIN — DESVENLAFAXINE 50 MG: 50 TABLET, EXTENDED RELEASE ORAL at 18:22

## 2022-04-22 RX ADMIN — DESVENLAFAXINE 50 MG: 50 TABLET, EXTENDED RELEASE ORAL at 01:01

## 2022-04-22 RX ADMIN — FOLIC ACID 1 MG: 1 TABLET ORAL at 09:52

## 2022-04-22 RX ADMIN — PREDNISONE 5 MG: 5 TABLET ORAL at 09:52

## 2022-04-22 RX ADMIN — ANTI-FUNGAL POWDER MICONAZOLE NITRATE TALC FREE: 1.42 POWDER TOPICAL at 09:53

## 2022-04-22 RX ADMIN — Medication 125 MG: at 01:07

## 2022-04-22 RX ADMIN — ANTI-FUNGAL POWDER MICONAZOLE NITRATE TALC FREE: 1.42 POWDER TOPICAL at 22:35

## 2022-04-22 RX ADMIN — Medication 125 MG: at 09:00

## 2022-04-22 RX ADMIN — SODIUM CHLORIDE: 9 INJECTION, SOLUTION INTRAVENOUS at 19:58

## 2022-04-22 RX ADMIN — APIXABAN 5 MG: 5 TABLET, FILM COATED ORAL at 22:37

## 2022-04-22 RX ADMIN — ACYCLOVIR 400 MG: 400 TABLET ORAL at 22:46

## 2022-04-22 RX ADMIN — CEFTRIAXONE SODIUM 1000 MG: 1 INJECTION, POWDER, FOR SOLUTION INTRAMUSCULAR; INTRAVENOUS at 00:20

## 2022-04-22 RX ADMIN — ATORVASTATIN CALCIUM 10 MG: 10 TABLET, FILM COATED ORAL at 22:36

## 2022-04-22 RX ADMIN — ACETAMINOPHEN 650 MG: 325 TABLET ORAL at 01:35

## 2022-04-22 RX ADMIN — METOPROLOL TARTRATE 12.5 MG: 25 TABLET, FILM COATED ORAL at 09:52

## 2022-04-22 RX ADMIN — CLOZAPINE 50 MG: 25 TABLET ORAL at 22:37

## 2022-04-22 RX ADMIN — METOPROLOL TARTRATE 12.5 MG: 25 TABLET, FILM COATED ORAL at 22:36

## 2022-04-22 RX ADMIN — APIXABAN 5 MG: 5 TABLET, FILM COATED ORAL at 00:20

## 2022-04-22 RX ADMIN — MEROPENEM 1000 MG: 1 INJECTION INTRAVENOUS at 13:40

## 2022-04-22 RX ADMIN — SODIUM CHLORIDE: 9 INJECTION, SOLUTION INTRAVENOUS at 00:23

## 2022-04-22 RX ADMIN — CYCLOSPORINE 100 MG: 25 CAPSULE, GELATIN COATED ORAL at 09:00

## 2022-04-22 RX ADMIN — ALLOPURINOL 100 MG: 100 TABLET ORAL at 09:52

## 2022-04-22 RX ADMIN — ACYCLOVIR 400 MG: 400 TABLET ORAL at 01:02

## 2022-04-22 RX ADMIN — PANTOPRAZOLE SODIUM 40 MG: 40 TABLET, DELAYED RELEASE ORAL at 05:36

## 2022-04-22 RX ADMIN — APIXABAN 5 MG: 5 TABLET, FILM COATED ORAL at 09:02

## 2022-04-22 RX ADMIN — MYCOPHENOLATE MOFETIL 1000 MG: 250 CAPSULE ORAL at 01:07

## 2022-04-22 RX ADMIN — METOPROLOL TARTRATE 12.5 MG: 25 TABLET, FILM COATED ORAL at 00:19

## 2022-04-22 RX ADMIN — ANTI-FUNGAL POWDER MICONAZOLE NITRATE TALC FREE: 1.42 POWDER TOPICAL at 01:01

## 2022-04-22 RX ADMIN — ACYCLOVIR 400 MG: 400 TABLET ORAL at 09:00

## 2022-04-22 RX ADMIN — CLOZAPINE 50 MG: 25 TABLET ORAL at 01:01

## 2022-04-22 RX ADMIN — MYCOPHENOLATE MOFETIL 1000 MG: 250 CAPSULE ORAL at 09:01

## 2022-04-22 RX ADMIN — ATORVASTATIN CALCIUM 10 MG: 10 TABLET, FILM COATED ORAL at 00:20

## 2022-04-22 RX ADMIN — Medication 125 MG: at 22:35

## 2022-04-22 RX ADMIN — Medication 1 CAPSULE: at 22:41

## 2022-04-22 RX ADMIN — LEVOTHYROXINE SODIUM 50 MCG: 0.05 TABLET ORAL at 05:36

## 2022-04-22 RX ADMIN — CYCLOSPORINE 100 MG: 25 CAPSULE, GELATIN COATED ORAL at 22:36

## 2022-04-22 RX ADMIN — CYCLOSPORINE 100 MG: 25 CAPSULE, GELATIN COATED ORAL at 01:01

## 2022-04-22 RX ADMIN — MYCOPHENOLATE MOFETIL 1000 MG: 250 CAPSULE ORAL at 22:35

## 2022-04-22 RX ADMIN — SODIUM BICARBONATE 650 MG TABLET 650 MG: at 09:52

## 2022-04-22 RX ADMIN — Medication 1000 UNITS: at 09:52

## 2022-04-22 ASSESSMENT — ENCOUNTER SYMPTOMS
TROUBLE SWALLOWING: 0
BACK PAIN: 0
ABDOMINAL PAIN: 0
COUGH: 0
SORE THROAT: 0
WHEEZING: 0
DIARRHEA: 0
SINUS PAIN: 0
EYE PAIN: 0
VOMITING: 1
RHINORRHEA: 0
SHORTNESS OF BREATH: 0
NAUSEA: 1

## 2022-04-22 ASSESSMENT — PAIN SCALES - WONG BAKER
WONGBAKER_NUMERICALRESPONSE: 0
WONGBAKER_NUMERICALRESPONSE: 0

## 2022-04-22 ASSESSMENT — PAIN SCALES - GENERAL
PAINLEVEL_OUTOF10: 0
PAINLEVEL_OUTOF10: 0

## 2022-04-22 NOTE — PLAN OF CARE
Problem: Discharge Planning  Goal: Discharge to home or other facility with appropriate resources  Outcome: Not Progressing

## 2022-04-22 NOTE — H&P
[address]                              HISTORY AND PHYSICAL    PATIENT NAME: Jair Johnston              :        1955  MED REC NO:   [id number]                            ROOM:       0211  ACCOUNT NO:   [de-identified]                           ADMIT DATE: 2022  PROVIDER:     Basia Dean MD      CHIEF COMPLAINT:  Vomiting and burning in urine. HISTORY OF PRESENT ILLNESS:  This is a 72-year-old white lady with  significant past medical history of chronic renal failure, status post  renal transplant in [facility] in , bipolar disorder,  hypertension, hyperlipidemia, hypothyroidism, who was recently  discharged from the hospital last month and she had C. diff colitis and  altered mental status. She was doing very well in the rehab facility  and was able to start standing and walking with a walker yesterday. The  patient stated that she could not keep anything, and she vomited twice. She could not even take her medications and for the last couple of days  she has been having a burning in urine and some pain with urination. She stated that she did not have any fever, they called Dr. Hector Hernandez and  advised them to take her to the ER. In the ER, apparently she was  hypotensive with systolic blood pressure in the 70s, and she had a fever  of 100.1 and 100.5. She was tachycardic in the one-teens, 120s. She  was given fluids and IV bolus and that made her pressure much better and  pulse also was much better. She was also given antibiotic in the  emergency room. She was still on the tapering dose of vancomycin  orally; the patient was then admitted for further evaluation and  treatment of her urinary tract infection and sepsis. PAST MEDICAL HISTORY:  1. Chronic renal failure, status post renal transplant in ,  baseline creatinine about 1.6.   She had transplant at the [facility].  2.  Hypertension. 3.  Bipolar disorder. 4.  Hyperlipidemia. 5.  Obesity. 6.  Hypothyroidism. 7.  Left mid and distal femoral vein and popliteal vein DVT,  non-occlusive. 8.  Tachycardia. 9.  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 PAST SURGICAL HISTORY:  1.  . 2.  Dialysis catheter in . 3.  Kidney transplant  in [facility].  4.  Parathyroidectomy. 5.  IVC filter placed in 2022 for DVT and possible GI bleed at that  time. ALLERGIES:  She is allergic to [facility] and SULFA. SOCIAL HISTORY:  She quit smoking about 8 years ago. Used to smoke 20  packs a year for many years. She does not drink any alcohol or use any  drugs. She used to live by herself but recently she has been in rehab  due to recurrent hospitalization. FAMILY HISTORY:  Her father had dementia and diabetes. Her mom had  diabetes. MEDICATIONS:  She is on Clozaril 50 mg every night, hydrochlorothiazide  25 daily, Procrit 2000 units subcutaneously every 3 weeks, Tylenol 650  every 4 hours as needed, vancomycin 125 mg p.o. twice a day, vitamin D  1000 daily, midodrine 5 mg twice a day as needed, acyclovir 400 mg twice  a day, simethicone 160 three a day as needed, sodium bicarb 650 daily,  magnesium oxide 400 daily, Lopressor 12.5 twice a day, Protonix 40 mg  daily, Eliquis 5 mg twice a day, Lipitor 10 mg daily, DuoNeb nebulizer  every 6 hours as needed, allopurinol 100 mg daily, Pristiq 50 mg daily,  cyclosporine 100 mg twice a day, prednisone 5 mg daily, CellCept 1000  twice a day, Synthroid 50 mcg daily. PHYSICAL EXAMINATION:  GENERAL:  The patient is lying in bed in no acute distress. She seems  comfortable. She is alert, awake, oriented x3. VITAL SIGNS:  Blood pressure this morning 92/55, pulse 100, respiration  20, temperature 97.8, oxygenation 95% on 2 liters, 86% on room air. HEENT:  Pupils equal, regular, reactive to light.   LUNGS:  Diminished in the bases and bibasilar rales. HEART:  Regular rate and rhythm with systolic murmur over base and apex. ABDOMEN:  Soft, nontender, but distended. Positive bowel sounds. No  organomegaly appreciated. EXTREMITIES:  No edema. Peripheral pulses weakly palpable. NEUROLOGIC:  No focal motor or sensory deficit, but she has generalized  weakness. ASSESSMENT AND PLAN:  1. Sepsis secondary to urinary tract infection. We will get urine and  blood cultures; we will consult Infectious Disease. We will place on  Rocephin 1 gm IV daily. 2.  Chronic renal failure, we will monitor her renal function, is almost  back to her baseline at 1.6, 1.7.  3.  Dehydration secondary to sepsis. She was given 2 liters bolus of  normal saline in the emergency room which helped with her blood  pressure. 4.  Hypotension secondary to above, resolved with just IV fluids. She  is also on midodrine as needed and monitor closely. 5.  Hypoxia secondary to sepsis. She is on 2 liters of oxygen. 6.  Left femoral and popliteal vein thrombosis, we will continue on the  Eliquis. 7.  History of renal transplant and she is immunocompromised on CellCept  and cyclosporine. 8.  Anemia of chronic disease. She is on Procrit for the anemia. We  will check her iron levels. 9.  SVT and tachycardia. She is on metoprolol on hold as systolic blood  pressure is less than 100.  10. Hyperlipidemia, we will resume her statin. 11.  DVT in the left mid and distal femoral vein and popliteal vein. She had IVC filter placed in 02/2022 for a possible GI bleed at that  time.         Sapphire Trevino MD    D: 04/22/2022 10:01:33       T: 04/22/2022 10:08:34     [email or web address]  Job#: [id number]     Doc#: [id number]    CC:

## 2022-04-22 NOTE — PROGRESS NOTES
[address] [facility]  [facility]        Date:2022                                                  Patient Name: Rose Retana    MRN: [id number]    : 1955    Room: 0027/7205-05      Evaluating OT: Mame Muro OTR/L #[id number]     Referring Provider and Specific Provider Orders/Date:      22  OT eval and treat  Start:  22,   End:  22,   ONE TIME,   Standing Count:  1 Occurrences,   Kalpesh Grissom MD      Placement Recommendation: Subacute Rehab        Diagnosis:   1. Sepsis without acute organ dysfunction, due to unspecified organism ([facility] [address]    2. Chronic renal impairment, unspecified CKD stage    3.  Kidney transplant recipient         Surgery: None      Pertinent Medical History:       Past Medical History:   Diagnosis Date    Abnormal EKG     left bundle branch block    Acute gout involving toe of right foot 2020    Acute on chronic combined systolic (congestive) and diastolic (congestive) heart failure (HCC)     Cancer (HCC)     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    Cerebrovascular disease     Clotting disorder ([address] 1985    thrombophlebitis after c section delivery    Depression     15 years followed by dr Farzad Caldwell Hemod[facility] patient [facility])     History of blood transfusion     Hyperlipidemia     Hypertension     Hypothyroidism     Leg swelling 2020    Lymphedema of both lower extremities 2020    Peripheral vascular disease ([address]     Renal failure 2012    renal transplant    Thrombophlebitis     after c section delivery    Thyroid disease          Past Surgical History:   Procedure Laterality Date   [address]    one normal delivery    COLECTOMY N/A 1/15/2022    DIAGNOSTIC  LAPAROSCOPY LYSIS OF ADHESIONS performed by Tyrell Delgado MD at [address] [place] [address] DIAGNOSTIC CARDIAC CATH LAB PROCEDURE  2006    normal coronaries and 1996 normal coronaries    DIALYSIS FISTULA CREATION  march and july 2012    phase one and two patient will start dialysis when needed   [address]    transfemoral venous bypass grafts    IR IVC FILTER PLACEMENT W IMAGING  2/26/2022    IR IVC FILTER PLACEMENT W IMAGING 2/26/2022 [address] SPECIAL PROCEDURES    KIDNEY TRANSPLANT  2016    KIDNEY TRANSPLANT  2015    KIDNEY TRANSPLANT  2015    PARATHYROIDECTOMY  2006    left parathyroid  implant and parathyroidectomy    TRANSESOPHAGEAL ECHOCARDIOGRAM N/A 2/28/2022    TRANSESOPHAGEAL ECHOCARDIOGRAM WITH BUBBLE STUDY performed by Bruna Lopez MD at [address] [facility] ENDOSCOPY        Precautions:  Fall Risk, falls and alarm, O2      Assessment of current deficits    [x] Functional mobility  [x]ADLs  [x] Strength               []Cognition    [x] Functional transfers   [x] IADLs         [x] Safety Awareness   [x]Endurance    [x] Fine Coordination              [x] Balance      [] Vision/perception   []Sensation     []Gross Motor Coordination  [x] ROM  [] Delirium                   [] Motor Control     OT PLAN OF CARE   OT POC based on physician orders, patient diagnosis and results of clinical assessment    Frequency/Duration 1-3 days/wk for 2 weeks PRN     Specific OT Treatment Interventions to include:   * Instruction/training on adapted ADL techniques and AE recommendations to increase functional independence within precautions       * Training on energy conservation strategies, correct breathing pattern and techniques to improve independence/tolerance for self-care routine  * Functional transfer/mobility training/DME recommendations for increased independence, safety, and fall prevention  * Patient/Family education to increase follow through with safety techniques and functional independence  * Recommendation of environmental modifications for increased safety with functional transfers/mobility and ADLs  * Therapeutic exercise to improve motor endurance, ROM, and functional strength for ADLs/functional transfers  * Therapeutic activities to facilitate/challenge dynamic balance, stand tolerance for increased safety and independence with ADLs  * Therapeutic activities to facilitate gross/fine motor skills for increased independence with ADLs  * Positioning to improve skin integrity, interaction with environment and functional independence    Recommended Adaptive Equipment: TBD     Home Living: Pt arrived from [facility] Level of Function: Requires assist with ADLs; has been working with rehab on functional transfers and taking steps within parallel bars. Pain Level: Pt denied pain. Cognition: A&O: 3/4; Follows 2 step directions   Memory: fair    Sequencing: fair    Problem solving: fair    Judgement/safety: fair     [facility]   AM-PAC Daily Activity Inpatient   How much help for putting on and taking off regular lower body clothing?: Total  How much help for Bathing?: A Lot  How much help for Toileting?: Total  How much help for putting on and taking off regular upper body clothing?: A Lot  How much help for taking care of personal grooming?: A Lot  How much help for eating meals?: A Little  AM-[facility] Inpatient Daily Activity Raw Score: 11  AM-PAC Inpatient ADL T-Scale Score : 29.04  ADL Inpatient CMS 0-100% Score: 70.42  ADL Inpatient CMS G-Code Modifier : CL     Functional Assessment:    Initial Eval Status  Date: 4/22/22   Treatment Status  Date: STGs = LTGs  Time frame: 10-14 days   Feeding Minimal Assist     Moderate [place]    Grooming Moderate Assist     Supervision    UB Dressing Moderate Assist    Supervision    LB Dressing Dependent     Minimal Assist    Bathing Moderate/Maximal Assist     Minimal Assist    Toileting Dependent     Minimal Assist    Bed Mobility  Supine to sit: Moderate Assist   Sit to supine: Moderate Assist   Scooting:  Moderate Assist to EOB Supine to sit: Independent   Sit to supine: Independent    Functional Transfers Sit to stand: Moderate Assist   Stand to sit: Moderate Assist     Transfer training with verbal cues for hand placement to improve safety. Supervision    Functional Mobility Minimal Assist x 2 with wheeled walke to improve balance with side steps to [facility], verbal cues for walker sequence/control and safety. Minimal Assist with use of wheeled walker   Balance Sitting:     Static: fair    Dynamic: fair/fair minus  Standing: fair minus     Sitting:     Static: good    Dynamic: good  Standing: fair plus    Activity Tolerance fair    Increase standing tolerance >2 minutes for improved engagement with functional transfers and indep in ADLs     Visual/  Perceptual Glasses: Yes, not present at bedside    Reports changes in vision since admission: None reported      NA      Hand Dominance: Right     AROM (PROM) Strength Additional Info:  Goal:   RUE  WFL 3/5 good  and fair FMC/dexterity noted during ADL tasks   Improve overall RUE strength  for participation in functional tasks   LUE Impaired AROM   AAROM WFL    2-/5 Fair minus  and poor FMC/dexterity noted during ADL tasks   Improve overall LUE strength  for participation in functional tasks     Hearing:  [facility]   Sensation:   No c/o numbness or tingling  Tone:  WFL   Edema:      Vitals: 2L  HR at rest: 79 bpm HR with activity:  bpm HR at end of session:  bpm   SpO2 at rest: 99% SpO2 with activity: 95% SpO2 at end of session: 95%   BP at rest:  BP with activity:  BP at end of session:      Comments: RN cleared patient for OT. Upon arrival patient in supine. Therapist facilitated and instructed pt on adapted  techniques & compensatory strategies to improve safety and independence with basic ADLs, bed mobility, functional transfers and mobility  to allow pt to achieve highest level of independence and safely. Pt demonstrated fair understanding of education & follow through.   At end of session, patient was supine in chair position, alarm on, with call light and phone within reach, all lines and tubes intact. Overall, patient demonstrated  decreased independence and safety during completion of ADL tasks. Pt would benefit from continued skilled OT to increase safety and independence with completion of ADL tasks and functional mobility for improved quality of life. Treatment: OT treatment provided this date includes:     Instruction/training on safety and adapted techniques for completion of ADLs    Instruction/training on safe functional mobility/transfer techniques    Instruction/training on energy conservation/work simplification for completion of functional transfers/bed mobility    Proper Positioning/Alignment - education on chair position   · Sitting EOB x10 minutes to improve dynamic sitting balance and activity tolerance during ADLs  · Skilled monitoring of O2 sats- see section above     Rehab Potential: Good for established goals. Patient / Family Goal: Not stated      Patient and/or family were instructed on functional diagnosis, prognosis/goals and OT plan of care. Demonstrated fair understanding. Eval Complexity: Low    Time In: 1:35 PM   Time Out: 2:10 PM    Total Treatment Time: 15      Min Units   OT Eval Low 97165  X  1    OT Eval Medium 32776      OT Eval High 87543      OT Re-Eval [id number]            ADL/Self Care 45823     Therapeutic Activities 41139  15 1   Therapeutic Ex 48811       Orthotic Management 75282       Manual 43230     Neuro Re-Ed 46294       Non-Billable Time        Evaluation Time additionally includes thorough review of current medical information, gathering information on past medical history/social history and prior level of function, interpretation of standardized testing/informal observation of tasks, assessment of data and development of plan of care and goals.         Evaluating OT: Yanira Klein OTR/L #[id number]

## 2022-04-22 NOTE — CONSULTS
[facility]  Consult Note    [address] [facility] [address]   [place], [address]  Phone [phone]   [phone]      Admit Date: 2022  4:45 PM  Pt Name: Osbaldo Carter  MRN: [id number]  : 1955  Reason for Consult:    Chief Complaint   Patient presents with    Emesis     pt sent here from nursing home, vomitting onset today     Requesting Physician:  Ernie Cruz MD  PCP: Ernie Cruz MD  History Obtained From:  patient, chart   ID consulted for Sepsis [facility]) [A41.9]  Kidney transplant recipient [Z94.0]  Chronic renal impairment, unspecified CKD stage [N18.9]  Sepsis without acute organ dysfunction, due to unspecified organism ([address] [A41.9]  on hospital day [address]       Chief Complaint   Patient presents with    Emesis     pt sent here from nursing home, vomitting onset today     HISTORYOF PRESENT ILLNESS   Osbaldo Carter is a 78 y.o. female who presents with   has a past medical history of Abnormal EKG, Acute gout involving toe of right foot, Acute on chronic combined systolic (congestive) and diastolic (congestive) heart failure ([address] Cancer ([address] Cerebrovascular disease, Clotting disorder ([address] Depression, Hemodialysis patient ([address] History of blood transfusion, Hyperlipidemia, Hypertension, Hypothyroidism, Leg swelling, Lymphedema of both lower extremities, Peripheral vascular disease ([address] Renal failure, Thrombophlebitis, and Thyroid disease.    ED TRIAGEVITALS  BP: (!) 109/57, Temp: 98 °F (36.7 °C), Pulse: 90, Resp: 19, SpO2: 95 %  HPI  PT CAME IN WITH uti SYMPTOMS SHE DENIES DIARRHEA  HAD N/V  HAS WORKED WITH PT AT [facility]   SHE HAD BAIT852.5 TC 98  ON 1l  WBC8.2 CR1.7  UA BLOOD NITRITE LE LE BACTERIA   HAS H/O CDIFF 3/10/2022  H/O PSEUDOMONAS BACTERURIA 3/9/2022  CURRENTLY ON      cefTRIAXone (ROCEPHIN) 1,000 mg in sterile water 10 mL IV syringe, Q24H     REVIEW OF SYSTEMS     CONSTITUTIONAL:   No fever, chills, weight loss  ALLERGIES:    No urticaria, hay fever,    EYES:     No blurry vision, loss of vision, eye pain  ENT:      No hearing loss, sore throat  CARDIOVASCULAR:  No chest pain or palpitations  RESPIRATORY:   No cough, sob  ENDOCRINE:    No increase thirst, urination   HEME-LYMPH:   No easy bruising or bleeding  GI:     No nausea, vomiting or diarrhea  :       urinary complaints  NEURO:    No seizures, stroke, HA  MUSCULOSKELETAL:  No muscle aches or pain, no joint pain  SKIN:     No rash or itch  PSYCH:    No depression or anxiety    Medications Prior to Admission: diphenhydrAMINE (BENADRYL) 25 MG tablet, Take 25 mg by mouth every 6 hours as needed for Itching or Allergies  cloZAPine (CLOZARIL) 50 MG tablet, Take 50 mg by mouth nightly  hydroCHLOROthiazide (HYDRODIURIL) 25 MG tablet, Take 25 mg by mouth daily  nystatin (MYCOSTATIN) 967605 UNIT/GM powder, Apply 1 each topically 3 times daily Apply to Groin  ondansetron (ZOFRAN) 4 MG tablet, Take 4 mg by mouth every 8 hours as needed for Nausea or Vomiting  epoetin casey-epbx (RETACRIT) 2000 UNIT/ML injection, Inject 2,000 Units into the skin See Admin Instructions Every 6 days  acetaminophen (TYLENOL) 325 MG tablet, Take 650 mg by mouth every 4 hours as needed for Pain or Fever   vancomycin (VANCOCIN) 125 MG capsule, Take 125 mg by mouth in the morning and at bedtime Twice daily for 10 days.   1st dose administered evening of 04/14/2022  vitamin D (CHOLECALCIFEROL) 25 MCG (1000 UT) TABS tablet, Take 1,000 Units by mouth daily  acyclovir (ZOVIRAX) 400 MG tablet, Take 400 mg by mouth 2 times daily  midodrine (PROAMATINE) 5 MG tablet, Take 1 tablet by mouth 2 times daily as needed (For SBP < 95)  simethicone (MYLICON) 80 MG chewable tablet, Take 2 tablets by mouth 3 times daily as needed (cramping)  sodium bicarbonate 650 MG tablet, Take 1 tablet by mouth daily  magnesium oxide (MAG-OX) 400 MG tablet, Take 1 tablet by mouth daily  metoprolol tartrate (LOPRESSOR) 25 MG tablet, Take 0.5 tablets by mouth 2 times daily  pantoprazole (PROTONIX) 40 MG tablet, Take 1 tablet by mouth every morning (before breakfast)  apixaban (ELIQUIS) 5 MG TABS tablet, Take 5 mg by mouth 2 times daily   atorvastatin (LIPITOR) 10 MG tablet, Take 10 mg by mouth at bedtime   ipratropium-albuterol (DUONEB) 0.5-2.5 (3) MG/3ML SOLN nebulizer solution, Inhale 3 mLs into the lungs every 6 hours as needed for Shortness of Breath  allopurinol (ZYLOPRIM) 100 MG tablet, Take 100 mg by mouth daily  Cyanocobalamin (B-12 COMPLIANCE INJECTION) 1000 MCG/ML KIT, Inject 1,000 mcg as directed every 28 days   desvenlafaxine succinate (PRISTIQ) 50 MG TB24 extended release tablet, Take 1 tablet by mouth every evening  cycloSPORINE (SANDIMMUNE) 100 MG capsule, Take 100 mg by mouth 2 times daily  predniSONE (DELTASONE) 5 MG tablet, Take 5 mg by mouth every morning   folic acid (FOLVITE) 1 MG tablet, Take 1 mg by mouth daily  docusate sodium (COLACE) 100 MG capsule, Take 100 mg by mouth 2 times daily  mycophenolate (CELLCEPT) 500 MG tablet, Take 1,000 mg by mouth 2 times daily  levothyroxine (SYNTHROID) 50 MCG tablet, Take 50 mcg by mouth daily.     CURRENT MEDICATIONS     Current Facility-Administered Medications:     mycophenolate (CELLCEPT) capsule 1,000 mg, 1,000 mg, Oral, BID, Susannah Ramirez MD, 1,000 mg at 04/22/22 0901    cycloSPORINE (SANDIMMUNE) capsule 100 mg, 100 mg, Oral, BID, Susannah Ramirez MD, 100 mg at 04/22/22 0900    acetaminophen (TYLENOL) tablet 650 mg, 650 mg, Oral, Q4H PRN, Tori Niño MD, 650 mg at 04/22/22 0135    acyclovir (ZOVIRAX) tablet 400 mg, 400 mg, Oral, BID, Susannah Ramierz MD, 400 mg at 04/22/22 0900    allopurinol (ZYLOPRIM) tablet 100 mg, 100 mg, Oral, Daily, Tori Niño MD, 100 mg at 04/22/22 1305    apixaban (ELIQUIS) tablet 5 mg, 5 mg, Oral, BID, Susannah Ramirez MD, 5 mg at 04/22/22 0902    atorvastatin (LIPITOR) tablet 10 mg, 10 mg, Oral, Nightly, Tori Niño MD, 10 mg at 04/22/22 0020    cloZAPine (CLOZARIL) tablet 50 mg, 50 mg, Oral, Nightly, Susannah Ramirez MD, 50 mg at 04/22/22 0101    desvenlafaxine succinate (PRISTIQ) extended release tablet 50 mg, 50 mg, Oral, QPM, Susannah Ramirez MD, 50 mg at 04/22/22 0101    docusate sodium (COLACE) capsule 100 mg, 100 mg, Oral, BID PRN, Nicole Neil MD    folic acid (FOLVITE) tablet 1 mg, 1 mg, Oral, Daily, Nicole Neil MD, 1 mg at 04/22/22 6369    ipratropium-albuterol (DUONEB) nebulizer solution 3 mL, 3 mL, Inhalation, Q6H PRN, Nicole Neil MD    levothyroxine (SYNTHROID) tablet 50 mcg, 50 mcg, Oral, Daily, Asha Ramirez MD, 50 mcg at 04/22/22 0536    metoprolol tartrate (LOPRESSOR) tablet 12.5 mg, 12.5 mg, Oral, BID, Nicole Neil MD, 12.5 mg at 04/22/22 7086    midodrine (PROAMATINE) tablet 5 mg, 5 mg, Oral, BID PRN, Asha Ramirez MD    miconazole (MICOTIN) 2 % powder, , Topical, BID, Nicole Neil MD, Given at 04/22/22 0953    ondansetron (ZOFRAN) tablet 4 mg, 4 mg, Oral, Q8H PRN, Nicole Neil MD, 4 mg at 04/22/22 0020    pantoprazole (PROTONIX) tablet 40 mg, 40 mg, Oral, QAM AC, Susannah Ramirez MD, 40 mg at 04/22/22 0536    predniSONE (DELTASONE) tablet 5 mg, 5 mg, Oral, QAM, Asha Ramirez MD, 5 mg at 04/22/22 0918    simethicone (MYLICON) chewable tablet 160 mg, 160 mg, Oral, TID PRN, Nicole Neil MD    sodium bicarbonate tablet 650 mg, 650 mg, Oral, Daily, Nicole Neil MD, 650 mg at 04/22/22 8768    vancomycin (VANCOCIN) oral solution 125 mg, 125 mg, Oral, BID, Nicole Neil MD, 125 mg at 04/22/22 0900    vitamin D (CHOLECALCIFEROL) tablet 1,000 Units, 1,000 Units, Oral, Daily, Nicole Neil MD, 1,000 Units at 04/22/22 9832    cefTRIAXone (ROCEPHIN) 1,000 mg in sterile water 10 mL IV syringe, 1,000 mg, IntraVENous, Q24H, Susannah Ramirez MD, 1,000 mg at 04/22/22 0020    0.9 % sodium chloride infusion, , IntraVENous, Continuous, Nicole Neil MD, Stopped at 04/22/22 2006    normal coronaries and  normal coronaries    DIALYSIS FISTULA CREATION  march and 2012    phase one and two patient will start dialysis when needed   [address]    transfemoral venous bypass grafts    IR IVC FILTER PLACEMENT W IMAGING  2022    IR IVC FILTER PLACEMENT W IMAGING 2022 [facility]ED[facility]    KIDNEY TRANSPLANT  2016    KIDNEY TRANSPLANT  2015    KIDNEY TRANSPLANT  2015    PARATHYROIDECTOMY  2006    left parathyroid  implant and parathyroidectomy    TRANSESOPHAGEAL ECHOCARDIOGRAM N/A 2022    TRANSESOPHAGEAL ECHOCARDIOGRAM WITH BUBBLE STUDY performed by Florecita Odom MD at [address]       Family History   Problem Relation Age of Onset    Diabetes Mother     Diabetes Father     Dementia Father      SOCIAL HISTORY       Social History     Socioeconomic History    Marital status:      Spouse name: None    Number of children: None    Years of education: None    Highest education level: None   Occupational History    None   Tobacco Use    Smoking status: Former Smoker     Packs/day: 1.00     Years: 20.00     Pack years: 20.00     Quit date: 10/1/2015     Years since quittin.5    Smokeless tobacco: Never Used   Vaping Use    Vaping Use: Never used   Substance and Sexual Activity    Alcohol use: No     Comment: 2 cups coffee per day    Drug use: No    Sexual activity: None   Other Topics Concern    None   Social History Narrative    None     Social Determinants of Health     Financial Resource Strain:     Difficulty of Paying Living Expenses: Not on file   Food Insecurity:     Worried About Running Out of Food in the Last Year: Not on file    Vannessa of Food in the Last Year: Not on file   Transportation Needs:     Lack of Transportation (Medical): Not on file    Lack of Transportation (Non-Medical):  Not on file   Physical Activity:     Days of Exercise per Week: Not on file    Minutes of deficits  Psych: Normal Affect  EXTERNAL CATH      DIAGNOSTIC RESULTS   RADIOLOGY:   XR CHEST PORTABLE    Result Date: 4/21/2022  EXAMINATION: ONE XRAY VIEW OF THE CHEST 4/21/2022 5:29 pm COMPARISON: 03/16/2022 HISTORY: ORDERING SYSTEM PROVIDED HISTORY: r/out PNA TECHNOLOGIST PROVIDED HISTORY: Reason for exam:->r/out PNA FINDINGS: The lungs are without acute focal process. There is no effusion or pneumothorax. Stable heart size. The osseous structures are without acute process. Right internal jugular line tip in the distal SVC. No evidence for pneumonia. LABS  Recent Labs     04/21/22  1722 04/22/22  0605   WBC 8.6 8.2   HGB 11.0* 8.8*   HCT 36.1 29.2*   .1* 110.6*    147     Recent Labs     04/21/22 1722 04/21/22  1722 04/22/22  0605     --  141   K 4.3  --  4.0      < > 104   CO2 24   < > 25   BUN 30*  --  32*   CREATININE 1.6*  --  1.7*   GFRAA 39  --  36   LABGLOM 32  --  30   GLUCOSE 124*  --  119*   PROT 6.5  --  5.4*   LABALBU 3.6  --  2.9*   CALCIUM 10.6*  --  9.8   BILITOT 0.7  --  0.6   ALKPHOS 66  --  50   AST 9  --  8   ALT 7  --  5    < > = values in this interval not displayed. No results for input(s): PROCAL in the last 72 hours.   Lab Results   Component Value Date    CRP 18.3 (H) 01/23/2022    CRP 34.6 (H) 11/17/2021     Lab Results   Component Value Date    SEDRATE 61 (H) 01/23/2022    SEDRATE 62 (H) 11/20/2021     No results found for: LXYDPAT8D3  Lab Results   Component Value Date    ADVIRPCR Not Detected 02/25/2022    BPARAPPCR Not Detected 02/25/2022    BPPTXPPCR Not Detected 02/25/2022    CHLPNEPCR Not Detected 02/25/2022    DRL967QOWE Not Detected 02/25/2022    PEZXFF9PHU Not Detected 02/25/2022    UTUZL38TIL Not Detected 02/25/2022    XSHZC54EXR Not Detected 02/25/2022    COVID19 Not Detected 04/21/2022    COVID19 Not Detected 02/25/2022    [facility] Not Detected 02/25/2022    RHENTPCR Not Detected 02/25/2022    FLUBPCR Not Detected 04/21/2022 [facility] Not Detected 02/25/2022    ANIMBLK3LBS Not Detected 02/25/2022    KEXEZGN3XQC Not Detected 02/25/2022    SOQZMTY3UDO Not Detected 02/25/2022    OFWSXOX7JOR Not Detected 02/25/2022    RSVPCR Not Detected 02/25/2022          Lab Results   Component Value Date    COVID19 Not Detected 04/21/2022    COVID19 Not Detected 02/25/2022     COVID-19/MARINA-COV2 LABS  Recent Labs     04/21/22  1722 04/22/22  0605   AST 9 8   ALT 7 5        MICROBIOLOGY:          Lab Results   Component Value Date    BC 5 Days no growth 03/09/2022    BC 5 Days no growth 02/27/2022    BC  02/25/2022     This organism was isolated in one set. Susceptibility testing is not routinely done as this  organism frequently represents skin contamination. Additional testing can be ordered by calling the  Microbiology Department.       ORG Pseudomonas aeruginosa 03/09/2022    ORG Staphylococcus coagulase-negative 02/25/2022    ORG Enterococcus faecalis 01/12/2022          WOUND/ABSCESS   Date Value Ref Range Status   01/24/2022 Growth not present  Final        Urine Culture, Routine   Date Value Ref Range Status   03/09/2022 75,000 CFU/ml  Final   02/26/2022 Growth not present  Final   01/23/2022 Growth not present  Final     MRSA Culture Only   Date Value Ref Range Status   11/17/2021 Methicillin resistant Staph aureus not isolated  Final          FINAL IMPRESSION    Patient is a 78 y.o. female who presented with   Chief Complaint   Patient presents with    Emesis     pt sent here from nursing home, vomitting onset today    and admitted for Sepsis ([facility] [A41.9]  Kidney transplant recipient [Z94.0]  Chronic renal impairment, unspecified CKD stage [N18.9]  Sepsis without acute organ dysfunction, due to unspecified organism ([facility] [A41.9]   IMMUNOCOMPROMISED PT     H/O CDIFF   H/o Pseudomonas bacteriuria   SWITCH TO MEROPENEM        miconazole (MICOTIN) 2 % powder, BID     cefTRIAXone (ROCEPHIN) 1,000 mg in sterile water 10 mL IV syringe, Q24H will sto p    Check cultures             Imaging and labs were reviewed per medical records and any ID pertinent labs were addressed with the patient. The patient/FAMILY  was educated about the diagnosis, prognosis, indications, risks and benefits of treatment. An opportunity to ask questions was given to the patient/FAMILY and questions were answered. Thank you for involving me in the care of Amy Zaragoza. Please do not hesitate to call for any questions or concerns.          Electronically signed by Mariam Benitez MD on 4/22/2022 at 12:25 PM

## 2022-04-22 NOTE — CARE COORDINATION
SOCIAL WORK / DISCHARGE PLANNING:  COVID neg 4/21, WILL NEED RAPID COVID DAY OF DC. Pt admitted from [facility] [place], [address] [place] [address], fax [phone], skilled. Per [place] rep pt is not a bedhold, will accept back and NO PRECERT is needed. Sw spoke with pt's mother who was visiting. She states the plan is to return to [facility] as previous. GINA will need signed by physician.                     Electronically signed by AG Mcgee on 4/22/2022 at 3:28 PM

## 2022-04-22 NOTE — ED PROVIDER NOTES
79 YO female presents today to the ED with complaints of nausea and vomiting onset today. Pt describes her sx as sudden in onset, moderate in severity, persistent in nature and worsening over time. Nothing seems to make them better or worse. Denies any aggravating or alleviating factors. She denies any associated abd pain, fevers or chills. Pt states that she is currently residing at a nursing home facility for rehab. She had been admitted to the hospital with sepsis from a urinary tract infection and was discharged to a nursing home facility several months ago. She denies any dyruia, hematuria or urinary frequency. Review of Systems   Constitutional: Negative for diaphoresis and fever. HENT: Negative for ear pain, hearing loss, rhinorrhea, sinus pain, sore throat and trouble swallowing. Eyes: Negative for pain. Respiratory: Negative for cough, shortness of breath and wheezing. Cardiovascular: Negative for chest pain and palpitations. Gastrointestinal: Positive for nausea and vomiting. Negative for abdominal pain and diarrhea. Endocrine: Negative for polyuria. Genitourinary: Negative for flank pain, frequency, hematuria and urgency. Musculoskeletal: Negative for back pain, neck pain and neck stiffness. Neurological: Negative for dizziness, speech difficulty, weakness, light-headedness and numbness. Psychiatric/Behavioral: Negative for confusion. The patient is not nervous/anxious. Physical Exam  Constitutional:       General: She is not in acute distress. Appearance: Normal appearance. She is ill-appearing and diaphoretic. She is not toxic-appearing. HENT:      Head: Normocephalic and atraumatic. Nose: No rhinorrhea. Eyes:      General: No scleral icterus. Extraocular Movements: Extraocular movements intact. Pupils: Pupils are equal, round, and reactive to light. Cardiovascular:      Rate and Rhythm: Tachycardia present.       Heart sounds: Normal heart sounds. No murmur heard. No friction rub. No gallop. Pulmonary:      Breath sounds: Normal breath sounds. No wheezing, rhonchi or rales. Abdominal:      Palpations: Abdomen is soft. Tenderness: There is no abdominal tenderness. Musculoskeletal:         General: No swelling. Cervical back: Normal range of motion. No rigidity or tenderness. Right lower leg: No edema. Left lower leg: No edema. Lymphadenopathy:      Cervical: No cervical adenopathy. Skin:     General: Skin is warm. Coloration: Skin is not jaundiced. Neurological:      General: No focal deficit present. Mental Status: She is alert and oriented to person, place, and time. Cranial Nerves: No cranial nerve deficit. Sensory: No sensory deficit. Motor: No weakness. Psychiatric:         Mood and Affect: Mood normal.         Behavior: Behavior normal.         Thought Content: Thought content normal.         Judgment: Judgment normal.          Procedures     MDM  Number of Diagnoses or Management Options  Chronic renal impairment, unspecified CKD stage  Kidney transplant recipient  Sepsis without acute organ dysfunction, due to unspecified organism [facility])  Diagnosis management comments: This is a 77 YO female who presents today to the ED from nursing home facility with complaints of nausea and vomiting. Pt currently on vancomycin for C.diff with hx of sepsis requiring hospitalization for UTI. On arrival to the ED pt is ill-appearing and diaphoretic. She is not in any acute distress and states that she feels fine besides her nausea. Vital signs show that she is hypotensive, tachycardic and febrile. On exams her lungs are CTA. Abd is soft, non-tender, non-distended. Otherwise normal. Appropriate labs and imaging ordered. Central line placed for impending need for frequent blood draws and potential need for pressors. Pt's workup suggestive of urinary source of infection.  She responded well to fluids and is not needing pressors at this time. Started on broad spectrum abx. Pt discussed with Dr. Richar Black who accepted pt for admission to [facility]. Amount and/or Complexity of Data Reviewed  Decide to obtain previous medical records or to obtain history from someone other than the patient: yes         ED Course as of 04/21/22 2017   Thu Apr 21, 2022   1713 ATTENDING PROVIDER ATTESTATION:     I have personally performed and/or participated in the history, exam, medical decision making, and procedures and agree with all pertinent clinical information unless otherwise noted. I have also reviewed and agree with the past medical, family and social history unless otherwise noted. I have discussed this patient in detail with the resident and provided the instruction and education regarding the evidence-based evaluation and treatment of emesis. History: Patient reports today, she began to feel fatigued, she had nausea and vomiting. That has her malaise worsened, she began to have chills. She presented here for evaluation. She does have history of kidney transplant. Reports she normally urinates 4+ times per day. She is only urinated once today. My findings: Osmel Escobar is a 78 y.o. female whom is in no distress. Physical exam reveals she is alert and oriented. Heart is tachycardic. Abdomen is obese, soft and nontender. Extremities are intact. Pedal pulses are weak. Skin is pale. My plan: Symptomatic and supportive care. Sepsis evaluation. Vasopressors, central line. Electronically signed by Anna Guzman DO on 4/21/22 at 5:14 PM EDT       [TG]   1840 Patient's blood pressure and pulse ox are much better following fluid infusion. [TG]   1906 EKG: This EKG is signed and interpreted by me.     Rate: 117  Rhythm: Sinus  Interpretation: no acute changes, non-specific EKG, left bundle branch block, and sinus tachycardia  Comparison: stable as compared to patient's most recent EKG   [TG] 2017 Discussed with Dr. Parul Swartz. Nadia for admission to [facility]. [TG]      ED Course User Index  [TG] Jenny Leonard DO     ED Course as of 04/22/22 1105   Thu Apr 21, 2022   1713 ATTENDING PROVIDER ATTESTATION:     I have personally performed and/or participated in the history, exam, medical decision making, and procedures and agree with all pertinent clinical information unless otherwise noted. I have also reviewed and agree with the past medical, family and social history unless otherwise noted. I have discussed this patient in detail with the resident and provided the instruction and education regarding the evidence-based evaluation and treatment of emesis. History: Patient reports today, she began to feel fatigued, she had nausea and vomiting. That has her malaise worsened, she began to have chills. She presented here for evaluation. She does have history of kidney transplant. Reports she normally urinates 4+ times per day. She is only urinated once today. My findings: Jeanette Garcia is a 78 y.o. female whom is in no distress. Physical exam reveals she is alert and oriented. Heart is tachycardic. Abdomen is obese, soft and nontender. Extremities are intact. Pedal pulses are weak. Skin is pale. My plan: Symptomatic and supportive care. Sepsis evaluation. Vasopressors, central line. Electronically signed by Jenny Leonard DO on 4/21/22 at 5:14 PM EDT       [TG]   1840 Patient's blood pressure and pulse ox are much better following fluid infusion. [TG]   1906 EKG: This EKG is signed and interpreted by me. Rate: 117  Rhythm: Sinus  Interpretation: no acute changes, non-specific EKG, left bundle branch block, and sinus tachycardia  Comparison: stable as compared to patient's most recent EKG   [TG]   2017 Discussed with Dr. Parul Swartz. Nadia for admission to [facility].  [TG]      ED Course User Index  [TG] Jenny Leonard DO       --------------------------------------------- PAST HISTORY ---------------------------------------------  Past Medical History:  has a past medical history of Abnormal EKG, Acute gout involving toe of right foot, Acute on chronic combined systolic (congestive) and diastolic (congestive) heart failure ([facility] [address] Cancer ([facility] Cerebrovascular disease, Clotting disorder ([facility] [address] Depression, Hemodialysis patient ([facility] [address] History of blood transfusion, Hyperlipidemia, Hypertension, Hypothyroidism, Leg swelling, Lymphedema of both lower extremities, Peripheral vascular disease ([facility] [address] Renal failure, Thrombophlebitis, and Thyroid disease. Past Surgical History:  has a past surgical history that includes Diagnostic Cardiac Cath Lab Procedure (); femoral bypass (); parathyroidectomy (); Colonoscopy;  section (); Dialysis fistula creation (march and 2012); Kidney transplant (); Kidney transplant (); Kidney transplant (); colectomy (N/A, 1/15/2022); IR GUIDED IVC FILTER PLACEMENT (2022); and transesophageal echocardiogram (N/A, 2022). Social History:  reports that she quit smoking about 6 years ago. She has a 20.00 pack-year smoking history. She has never used smokeless tobacco. She reports that she does not drink alcohol and does not use drugs. Family History: family history includes Dementia in her father; Diabetes in her father and mother. The patients home medications have been reviewed.     Allergies: Macrodantin [nitrofurantoin macrocrystal] and Sulfa antibiotics    -------------------------------------------------- RESULTS -------------------------------------------------    LABS:  Results for orders placed or performed during the hospital encounter of 04/21/22   COVID-19, Rapid    Specimen: Nasopharyngeal Swab   Result Value Ref Range    SARS-CoV-2, NAAT Not Detected Not Detected   RAPID INFLUENZA A/B ANTIGENS    Specimen: Nasopharyngeal   Result Value Ref Range    Influenza A by PCR Not Detected Not Detected Influenza B by PCR Not Detected Not Detected   CBC with Auto Differential   Result Value Ref Range    WBC 8.6 4.5 - 11.5 E9/L    RBC 3.28 (L) 3.50 - 5.50 E12/L    Hemoglobin 11.0 (L) 11.5 - 15.5 g/dL    Hematocrit 36.1 34.0 - 48.0 %    .1 (H) 80.0 - 99.9 fL    MCH 33.5 26.0 - 35.0 pg    MCHC 30.5 (L) 32.0 - 34.5 %    RDW 19.5 (H) 11.5 - 15.0 fL    Platelets 808 016 - 085 E9/L    MPV 12.3 (H) 7.0 - 12.0 fL    Neutrophils % 89.0 (H) 43.0 - 80.0 %    Lymphocytes % 8.0 (L) 20.0 - 42.0 %    Monocytes % 2.0 2.0 - 12.0 %    Eosinophils % 1.0 0.0 - 6.0 %    Basophils % 0.0 0.0 - 2.0 %    Neutrophils Absolute 7.65 (H) 1.80 - 7.30 E9/L    Lymphocytes Absolute 0.69 (L) 1.50 - 4.00 E9/L    Monocytes Absolute 0.17 0.10 - 0.95 E9/L    Eosinophils Absolute 0.09 0.05 - 0.50 E9/L    Basophils Absolute 0.00 0.00 - 0.20 E9/L    Toxic Granulation 1+     Anisocytosis 2+    Comprehensive Metabolic Panel w/ Reflex to MG   Result Value Ref Range    Sodium 140 132 - 146 mmol/L    Potassium reflex Magnesium 4.3 3.5 - 5.0 mmol/L    Chloride 100 98 - 107 mmol/L    CO2 24 22 - 29 mmol/L    Anion Gap 16 7 - 16 mmol/L    Glucose 124 (H) 74 - 99 mg/dL    BUN 30 (H) 6 - 23 mg/dL    CREATININE 1.6 (H) 0.5 - 1.0 mg/dL    GFR Non-African American 32 >=60 mL/min/1.73    GFR African American 39     Calcium 10.6 (H) 8.6 - 10.2 mg/dL    Total Protein 6.5 6.4 - 8.3 g/dL    Albumin 3.6 3.5 - 5.2 g/dL    Total Bilirubin 0.7 0.0 - 1.2 mg/dL    Alkaline Phosphatase 66 35 - 104 U/L    ALT 7 0 - 32 U/L    AST 9 0 - 31 U/L   Troponin   Result Value Ref Range    Troponin, High Sensitivity 85 (H) 0 - 9 ng/L   Urinalysis with Microscopic   Result Value Ref Range    Color, UA Yellow Straw/Yellow    Clarity, UA SLCLOUDY Clear    Glucose, Ur Negative Negative mg/dL    Bilirubin Urine Negative Negative    Ketones, Urine Negative Negative mg/dL    Specific Gravity, UA 1.010 1.005 - 1.030    Blood, Urine MODERATE (A) Negative    pH, UA 6.5 5.0 - 9.0    Protein, UA TRACE Negative mg/dL    Urobilinogen, Urine 0.2 <2.0 E.U./dL    Nitrite, Urine POSITIVE (A) Negative    Leukocyte Esterase, Urine LARGE (A) Negative    WBC, UA >20 (A) 0 - 5 /HPF    RBC, UA 1-3 0 - 2 /HPF    Epithelial Cells, UA FEW /HPF    Bacteria, UA FEW (A) None Seen /HPF   Lactate, Sepsis   Result Value Ref Range    Lactic Acid, Sepsis 0.7 0.5 - 1.9 mmol/L   Lactate, Sepsis   Result Value Ref Range    Lactic Acid, Sepsis 0.7 0.5 - 1.9 mmol/L   Cortisol Total   Result Value Ref Range    Cortisol 24.44 (H) 2.68 - 18.40 mcg/dL   Troponin   Result Value Ref Range    Troponin, High Sensitivity 84 (H) 0 - 9 ng/L   Arterial Blood Gas, Respiratory Only   Result Value Ref Range    POC Source Arterial     PH 37 7.390 7.350 - 7.450    PCO2 37 43.7 35.0 - 45.0 mmHg    PO2 37 91.3 (H) 60.0 - 80.0 mmHg    HCO3 26.4 (H) 22.0 - 26.0 mmol/L    B.E. 1.2 -3.0 - 3.0 mmol/L    O2 Sat 96.9 92.0 - 98.5 %     ID 8,219     DEVICE 17,310,521,400,678    CBC with Auto Differential   Result Value Ref Range    WBC 8.2 4.5 - 11.5 E9/L    RBC 2.64 (L) 3.50 - 5.50 E12/L    Hemoglobin 8.8 (L) 11.5 - 15.5 g/dL    Hematocrit 29.2 (L) 34.0 - 48.0 %    .6 (H) 80.0 - 99.9 fL    MCH 33.3 26.0 - 35.0 pg    MCHC 30.1 (L) 32.0 - 34.5 %    RDW 19.6 (H) 11.5 - 15.0 fL    Platelets 642 673 - 085 E9/L    MPV 12.0 7.0 - 12.0 fL    Neutrophils % 90.4 (H) 43.0 - 80.0 %    Lymphocytes % 3.5 (L) 20.0 - 42.0 %    Monocytes % 5.2 2.0 - 12.0 %    Eosinophils % 0.5 0.0 - 6.0 %    Basophils % 0.9 0.0 - 2.0 %    Neutrophils Absolute 7.38 (H) 1.80 - 7.30 E9/L    Lymphocytes Absolute 0.33 (L) 1.50 - 4.00 E9/L    Monocytes Absolute 0.41 0.10 - 0.95 E9/L    Eosinophils Absolute 0.00 (L) 0.05 - 0.50 E9/L    Basophils Absolute 0.07 0.00 - 0.20 E9/L   Comprehensive Metabolic Panel   Result Value Ref Range    Sodium 141 132 - 146 mmol/L    Potassium 4.0 3.5 - 5.0 mmol/L    Chloride 104 98 - 107 mmol/L    CO2 25 22 - 29 mmol/L    Anion Gap 12 7 - 16 mmol/L Glucose 119 (H) 74 - 99 mg/dL    BUN 32 (H) 6 - 23 mg/dL    CREATININE 1.7 (H) 0.5 - 1.0 mg/dL    GFR Non-African American 30 >=60 mL/min/1.73    GFR African American 36     Calcium 9.8 8.6 - 10.2 mg/dL    Total Protein 5.4 (L) 6.4 - 8.3 g/dL    Albumin 2.9 (L) 3.5 - 5.2 g/dL    Total Bilirubin 0.6 0.0 - 1.2 mg/dL    Alkaline Phosphatase 50 35 - 104 U/L    ALT 5 0 - 32 U/L    AST 8 0 - 31 U/L   EKG 12 Lead   Result Value Ref Range    Ventricular Rate 117 BPM    Atrial Rate 117 BPM    P-R Interval 158 ms    QRS Duration 138 ms    Q-T Interval 344 ms    QTc Calculation (Bazett) 479 ms    P Axis 57 degrees    R Axis -48 degrees    T Axis 105 degrees       RADIOLOGY:  XR CHEST PORTABLE   Final Result   No evidence for pneumonia. EKG:  This EKG is signed and interpreted by me. Rate: 117  Rhythm: Sinus  Interpretation: Tachycardia with no ST elevations or depressions. Comparison: stable as compared to patient's most recent EKG      ------------------------- NURSING NOTES AND VITALS REVIEWED ---------------------------  Date / Time Roomed:  4/21/2022  4:45 PM  ED Bed Assignment:  [id number]    The nursing notes within the ED encounter and vital signs as below have been reviewed.      Patient Vitals for the past 24 hrs:   BP Temp Temp src Pulse Resp SpO2 Height Weight   04/22/22 0945 (!) 109/57 98 °F (36.7 °C) Infrared 90 19 95 % -- --   04/22/22 0343 -- -- -- -- -- 95 % -- --   04/22/22 0330 (!) 93/55 97.8 °F (36.6 °C) Infrared 100 20 (!) 86 % -- --   04/22/22 0130 132/60 100.5 °F (38.1 °C) Infrared 114 20 92 % -- --   04/21/22 2345 139/72 98.6 °F (37 °C) Infrared 114 20 91 % -- --   04/21/22 2145 135/77 97.7 °F (36.5 °C) Oral 117 22 92 % -- --   04/21/22 2011 129/78 -- -- 113 18 94 % -- --   04/21/22 1758 126/60 -- -- 118 16 98 % -- --   04/21/22 1654 (!) 78/58 100.1 °F (37.8 °C) -- 121 18 (!) 89 % 5' 1\" (1.549 m) 226 lb (102.5 kg)       Oxygen Saturation Interpretation: Normal    ------------------------------------------ PROGRESS NOTES ------------------------------------------  Re-evaluation(s):  Time: 1900 are improving Patients symptoms   Repeat physical examination is improved    Counseling:  I have spoken with the patient and discussed todays results, in addition to providing specific details for the plan of care and counseling regarding the diagnosis and prognosis. Their questions are answered at this time and they are agreeable with the plan of admission.    --------------------------------- ADDITIONAL PROVIDER NOTES ---------------------------------  Consultations:  Time: 2017. Spoke with Dr. Anthony Guthrie. Discussed case. They will admit the patient. This patient's ED course included: a personal history and physicial examination, re-evaluation prior to disposition, multiple bedside re-evaluations, IV medications, cardiac monitoring, continuous pulse oximetry and complex medical decision making and emergency management    This patient has remained hemodynamically stable during their ED course. Diagnosis:  1. Sepsis without acute organ dysfunction, due to unspecified organism ([address]    2. Chronic renal impairment, unspecified CKD stage    3. Kidney transplant recipient        Disposition:  Patient's disposition: Admit to med/surg floor  Patient's condition is stable.          Nieves Clinton DO  Resident  04/22/22 6208

## 2022-04-22 NOTE — DISCHARGE INSTR - COC
Continuity of Care Form    Patient Name: Carl Rosario   :  1955  MRN:  [id number]    Admit date:  2022  Discharge date:  22    Code Status Order: Full Code   Advance Directives:      Admitting Physician:  Twyla Scott MD  PCP: Twyla Scott MD    Discharging Nurse: 22  Discharging Hospital Unit/Room#: 0631/4386-31  Discharging Unit Phone Number: [phone]    Emergency Contact:   Extended Emergency Contact Information  Primary Emergency Contact: DeniMayur   [address] Phone: [phone]  Mobile Phone: [phone]  Relation: Child   needed? No  Secondary Emergency Contact: Arianna Manuel  Mobile Phone: [phone]  Relation: Other  Preferred language: English   needed?  No    Past Surgical History:  Past Surgical History:   Procedure Laterality Date     SECTION  1985    one normal delivery    COLECTOMY N/A 1/15/2022    DIAGNOSTIC  LAPAROSCOPY LYSIS OF ADHESIONS performed by Kamila Laird MD at [facility] CATH LAB PROCEDURE      normal coronaries and  normal coronaries    DIALYSIS FISTULA CREATION  march and 2012    phase one and two patient will start dialysis when needed    [address]    transfemoral venous bypass grafts    IR IVC FILTER PLACEMENT W IMAGING  2022    IR IVC FILTER PLACEMENT W IMAGING 2022 [facility] SPECIAL PROCEDURES    KIDNEY TRANSPLANT  2016    KIDNEY TRANSPLANT  2015    KIDNEY TRANSPLANT  2015    PARATHYROIDECTOMY  2006    left parathyroid  implant and parathyroidectomy    TRANSESOPHAGEAL ECHOCARDIOGRAM N/A 2022    TRANSESOPHAGEAL ECHOCARDIOGRAM WITH BUBBLE STUDY performed by Annie Braga MD at [facility] ENDOSCOPY       Immunization History:   Immunization History   Administered Date(s) Administered    COVID-19, Pfizer Purple top, DILUTE for use, 12+ yrs, 30mcg/0.3mL dose 2021, 2021, 2021       Active Problems:  Patient Active Problem List   Diagnosis Code    Peripheral vascular disease ([facility] I73.9    Depression F32. A    Clotting disorder ([facility]) D68.9    Abnormal EKG R94.31    Cancer ([facility]) C80.1    Over weight E66.3    Kidney transplanted Z94.0    ESRD (end stage renal disease) ([facility]) N18.6    Non morbid obesity due to excess calories E66.09    Hypertension I10    Leg swelling M79.89    Lymphedema of both lower extremities I89.0    Acute gout involving toe of right foot M10.9    Sepsis secondary to UTI ([facility]) A41.9, N39.0    Acute kidney injury superimposed on CKD ([facility]) N17.9, N18.9    Thyroid disease E07.9    Acute on chronic combined systolic (congestive) and diastolic (congestive) heart failure ([facility]) I50.43    Sepsis ([facility]) A41.9    Acute renal failure ([facility]) N17.9    CECILY (acute kidney injury) ([facility] N17.9    Ischemic bowel disease ([facility]) K55.9    Altered mental status R41.82    Coma ([facility] R40.20    Acute respiratory failure with hypoxia ([facility]) J96.01    Hypoxia Y29.01    Acute metabolic encephalopathy E31.75    Acute deep vein thrombosis (DVT) of femoral vein of left lower extremity ([facility]) I82.412    Anemia D64.9    Positive blood culture R78.81    Difficult intravenous access Z78.9    Paroxysmal atrial fibrillation ([facility]) I48.0    Hypotension I95.9    LBBB (left bundle branch block) I44.7    History of DVT (deep vein thrombosis) Z86.718    Chronic anticoagulation Z79.01    Acute respiratory failure with hypoxemia ([facility]) J96.01       Isolation/Infection:   Isolation            C Diff Contact          Patient Infection Status       Infection Onset Added Last Indicated Last Indicated By Review Planned Expiration Resolved Resolved By    C-diff (Clostridium difficile) 03/10/22 03/11/22 03/10/22 CLOSTRIDIUM DIFFICILE EIA 03/18/22       Resolved    COVID-19 (Rule Out) 04/21/22 04/21/22 04/21/22 COVID-19, Rapid (Ordered)   04/21/22 Rule-Out Test Resulted    C-diff Rule Out 03/10/22 03/10/22 03/10/22 CLOSTRIDIUM DIFFICILE EIA (Ordered)   22 Rule-Out Test Resulted    C-diff Rule Out 22 CLOSTRIDIUM DIFFICILE EIA (Ordered)   22 Shena Sol RN    Order discontinued    COVID-19 (Rule Out) 22 Respiratory Panel, Molecular, with COVID-19 (Restricted: peds pts or suitable admitted adults) (Ordered)   22 Rule-Out Test Resulted    COVID-19 (Rule Out) 22 COVID-19, Rapid (Ordered)   22 Rule-Out Test Resulted    COVID-19 22 COVID-19   22     COVID-19 (Rule Out) 22 COVID-19 (Ordered)   22 Rule-Out Test Resulted    C-diff Rule Out 22 CLOSTRIDIUM DIFFICILE EIA (Ordered)   22 Sandi Julien RN    Order discontinued    COVID-19 (Rule Out) 22 Respiratory Panel, Molecular, with COVID-19 (Restricted: peds pts or suitable admitted adults) (Ordered)   22 Rule-Out Test Resulted    COVID-19 (Rule Out) 01/10/22 01/10/22 01/10/22 COVID-19, Rapid (Ordered)   01/10/22 Rule-Out Test Resulted    COVID-19 (Rule Out) 21 Respiratory Panel, Molecular, with COVID-19 (Restricted: peds pts or suitable admitted adults) (Ordered)   21 Rule-Out Test Resulted    COVID-19 (Rule Out) 21 COVID-19, Rapid (Ordered)   21 Rule-Out Test Resulted            Nurse Assessment:  Last Vital Signs: BP (!) 105/55   Pulse 82   Temp 98 °F (36.7 °C) (Infrared)   Resp 19   Ht 5' 1\" (1.549 m)   Wt 226 lb (102.5 kg)   SpO2 98%   BMI 42.70 kg/m²     Last documented pain score (0-10 scale): Pain Level: 0  Last Weight:   Wt Readings from Last 1 Encounters:   22 226 lb (102.5 kg)     Mental Status:  oriented, alert, and coherent    IV Access:  - Dialysis Catheter  - site  left, insertion date: ***    Nursing Mobility/ADLs:  Walking   Assisted  Transfer  Assisted  Bathing  Assisted  Dressing Assisted  Toileting  Assisted  Feeding  Independent  Med Admin  Assisted  Med Delivery   whole    Wound Care Documentation and Therapy:  Incision 01/15/22 Abdomen Lower;Medial (Active)   Number of days: 97        Elimination:  Continence: Bowel: Yes  Bladder: Yes  Urinary Catheter: None   Colostomy/Ileostomy/Ileal Conduit: No       Date of Last BM: 04/25/22    Intake/Output Summary (Last 24 hours) at 4/22/2022 1529  Last data filed at 4/22/2022 1457  Gross per 24 hour   Intake 1445.94 ml   Output 725 ml   Net 720.94 ml     I/O last 3 completed shifts: In: 905.9 [I.V.:252.5; IV Piggyback:653.4]  Out: 525 [Urine:175; Emesis/NG output:350]    Safety Concerns:     None    Impairments/Disabilities:      Vision    Nutrition Therapy:  Current Nutrition Therapy:   - Oral Diet:  General    Routes of Feeding: Oral  Liquids: No Restrictions  Daily Fluid Restriction: no  Last Modified Barium Swallow with Video (Video Swallowing Test): not done    Treatments at the Time of Hospital Discharge:   Respiratory Treatments:     Oxygen Therapy:  is not on home oxygen therapy.   Ventilator:    - No ventilator support    Rehab Therapies: Physical Therapy and Occupational Therapy  Weight Bearing Status/Restrictions: No weight bearing restrictions  Other Medical Equipment (for information only, NOT a DME order):  wheelchair  Other Treatments: ***    Patient's personal belongings (please select all that are sent with patient):  None    RN SIGNATURE:  Electronically signed by Claire Brooks RN on 4/26/22 at 1:31 PM EDT    CASE MANAGEMENT/SOCIAL WORK SECTION    Inpatient Status Date: 4/21/22    Readmission Risk Assessment Score:  Readmission Risk              Risk of Unplanned Readmission:  46           Discharging to Facility/ Agency   Name: [facility], [address] [place] [address], fax [phone],  Address:  Phone:  Fax:    Dialysis Facility (if applicable)   Name:  Address:  Dialysis Schedule:  Phone:  Fax:    / signature: Electronically signed by AG Avitia on 4/22/22 at 3:29 PM EDT    PHYSICIAN SECTION    Prognosis: Fair    Condition at Discharge: Stable    Rehab Potential (if transferring to Rehab): Good    Recommended Labs or Other Treatments After Discharge: ***    Physician Certification: I certify the above information and transfer of Erick Dooley  is necessary for the continuing treatment of the diagnosis listed and that she requires [place] for less 30 days. Update Admission H&P: No change in H&P    PHYSICIAN SIGNATURE:  Electronically signed by Renato Walker MD on 4/26/22 at 9:00 AM EDT                         HEART FAILURE  / CONGESTIVE HEART FAILURE  DISCHARGE INSTRUCTIONS:  GUIDELINES TO FOLLOW AT HOME    Self- Managed Care:     MEDICATIONS:  Take your medication as directed. If you are experiencing any side effects, inform your doctor, Do not stop taking any of your medications without letting your doctor know. Check with your doctor before taking any over-the-counter medications / herbal / or dietary supplements. They may interfere with your other medications. Do not take ibuprofen (Advil or Motrin) and naproxen (Aleve) without talking to your doctor first. They could make your heart failure worse. WEIGHT MONITORING:   Weigh yourself everyday (with the same scale) around the same time of the day and write it down. (you can chart them on a calendar or keep track of them on paper. Notify your doctor of a weight gain of 3 pounds or more in 1 day   OR a total of 5 pounds or more in 1 week    Take your weight record to your doctor visits  Also, the same goes if you loose more than 3# in one day, let your heart doctor know.          DIET:   Cardiac heart healthy diet- Low saturated / low trans fat, no added salt, caffeine restricted, Low sodium diet-   No more than 2,000mg (2 grams) of salt / sodium per day (which equals to a little less than  a teaspoon of salt)  If your doctor wants you on a fluid restriction. ..it is usually recommended a fluid limit of 2,000cc -  Fluid restriction- 2,000 ml (milliliters) = 64 ounces = you can have 8 glasses of fluid per day (each glass 8 ounces)    Follow a low salt diet - avoid using salt at the table, avoid / limit use of canned soups, processed / packaged foods, salted snacks, olives and pickles. Do not use a salt substitute without checking with your doctor, they may contain a high amount of potassioum. (Mrs. Jadyn Strange is safe to use). Limit the use of alcohol       CALL YOUR DOCTOR THE FIRST DAY YOU NOTICE ANY OF THESE   SYMPTOMS:  You have a weight gain of 3 pounds or more in 1 day         OR 5 pounds or more in one week  More shortness of breath  More swelling of your stomach, legs, ankles or feet  Feeling more tired, No energy  Dry hacky cough  Dizziness  More chest pain / discomfort       (CALL 911 IF ANY OF THE FOLLOWING OCCURS  Chest pain (not relieved with nitroglycerine, if you have been prescribed this medication)  Severe shortness of breath  Faint / Pass out  Confusion / cannot think clearly  If symptoms get worse           SMOKING - TOBACCO USE:  * IF YOU SMOKE - STOP! Kick the habit. [address] Program is offered at [place] [address] and [address] [facility]. Call [phone] extension [facility] for more information. ACTIVITY:   (Ask your doctor when you will be able to return to work and before starting any exercise program.  Do not drive unless unless your doctor has given you permission to do so). Start light exercise. Even if you can only do a small amount, exercise will help you get stronger, have more energy, help manage your weight and decrease  stress. Walking is an easy way to get exercise.  Start out slowly and  increase the amount you walk as tolerated  If you become short of breath, dizzy or have chest pain; stop, sit down, and rest.  If you feel \"wiped out\" the day after you exercise, walk at a slower pace or for a shorter distance. You can gradually increase the pace or amount of time. (Do not exercise right after a meal or in extreme temperatures, such as above 85 degrees, if the air is really humid, or wind chill is less than 20 degrees)                                             ADDITIONAL INFORMATION:  Avoid getting sick from colds and the flu. Stay away from friends or family that you know may have a contagious illness  Get plenty of rest   Get a flu shot each year. Get a pneumococcal vaccine shot. If you have had one before, ask your doctor whether you need another dose. My Goal for Self-management of Heart Failure Includes 5 steps :    1. Notice a change in symptoms ( weight gain, short of breath, leg swelling, decreased activity level, bloating. ...)    2. Evaluate the change: (use the Heart Failure Zones )     3. Decide to take action: decide what your options are, such as: (call your doctor for an extra visit, take a prescribed medication, such as your water pill if your doctor has given you directions to do so, [address])    4. Come up with a strategy:  (now you call the doctor for advice / appointment. This is where you take action!!! Do not wait, catch the symptom early and treat it before it worsens. 5. Evaluate the response: The next day, check your Heart Failure Zones: are you in the GREEN ZONE (safe zone)? Worsening symptoms of YELLOW ZONE? Or have you moved to the RED ZONE and need to call 911 or go to the Emergency Room for evaluation? Call your doctor's office to update them on your symptoms of heart failure. Learning About Heart Failure Zones  What are heart failure zones? Heart failure zones give you an easy way to see changes in your heart failure symptoms. They also tell you when you need to get help. Check every day to see which zone you are in. Green zone.  You are doing well. This is where you want to be. Your weight is stable. It's not going up or down. You breathe easily. You are sleeping well. You are able to lie flat without shortness of breath. You can do your usual activities. Yellow zone. Be careful. Your symptoms are changing. Call your doctor. You have new or increased shortness of breath. You are dizzy or lightheaded, or you feel like you may faint. You have sudden weight gain, such as more than 2 to 3 pounds in a day or 5 pounds in a week. (Your doctor may suggest a different range of weight gain.)  You have increased swelling in your legs, ankles, or feet. You are so tired or weak that you can't do your usual activities. You are not sleeping well. Shortness of breath wakes you up at night. You need extra pillows. Red zone. 911  This is an emergency. Call . You have symptoms of sudden heart failure. For example: You have severe trouble breathing. You cough up pink, foamy mucus. You have a new irregular or fast heartbeat. You have symptoms of a heart attack. These may include:  Chest pain or pressure, or a strange feeling in the chest.  Sweating. Shortness of breath. Nausea or vomiting. Pain, pressure, or a strange feeling in the back, neck, jaw, or upper belly or in one or both shoulders or arms. Lightheadedness or sudden weakness. A fast or irregular heartbeat. If you have symptoms of a heart attack 911 : After you call , the  may tell you to chew 1 adult-strength or 2 to 4 low-dose aspirin. Wait for an ambulance. Do not try to drive yourself. Follow-up care is a key part of your treatment and safety. Be sure to make and go to all appointments, and call your doctor if you are having problems. It's also a good idea to know your test results and keep a list of the medicines you take. Where can you learn more? Go to [email or web address].[email or web address] org and sign in to your [employer] account.  Enter T174 in the [facility]

## 2022-04-22 NOTE — PROGRESS NOTES
Physical Therapy  Physical Therapy Initial Evaluation/Plan of Care    Room #:  0635/0635-01  Patient Name: Saurabh Acevedo  YOB: 1955  MRN: [id number]    Date of Service: 4/22/2022     Tentative placement recommendation: Subacute rehab  Equipment recommendation:  To be determined      Evaluating Physical Therapist: Alyssa Hendrix PT, DPT #[id number]      Specific Provider Orders/Date/Referring Provider :     04/22/22 0015   PT eval and treat Start: 04/22/22 0015, End: 04/22/22 0015, ONE TIME, Standing Count: 1 Occurrences, Graham Bravo MD Acknowledge New    Admitting Diagnosis:   Sepsis ([address] [A41.9]  Kidney transplant recipient [Z94.0]  Chronic renal impairment, unspecified CKD stage [N18.9]  Sepsis without acute organ dysfunction, due to unspecified organism [facility]) [A41.9]      Surgery: none  Visit Diagnoses       Codes    Chronic renal impairment, unspecified CKD stage     N18.9    Kidney transplant recipient     Z94.0          Patient Active Problem List   Diagnosis    Peripheral vascular disease ([address]    Depression    Clotting disorder ([address]    Abnormal EKG    Cancer ([address]    Over weight    Kidney transplanted    ESRD (end stage renal disease) ([address]    Non morbid obesity due to excess calories    Hypertension    Leg swelling    Lymphedema of both lower extremities    Acute gout involving toe of right foot    Sepsis secondary to UTI ([address]    Acute kidney injury superimposed on CKD ([address]    Thyroid disease    Acute on chronic combined systolic (congestive) and diastolic (congestive) heart failure ([address]    Sepsis ([address]    Acute renal failure ([address]    CECILY (acute kidney injury) ([address]    Ischemic bowel disease ([address]    Altered mental status    Coma ([address]    Acute respiratory failure with hypoxia ([address]    Hypoxia    Acute metabolic encephalopathy    Acute deep vein thrombosis (DVT) of femoral vein of left lower extremity (HCC)    Anemia    Positive blood culture    Difficult intravenous access    Paroxysmal atrial fibrillation (HCC)    Hypotension    LBBB (left bundle branch block)    History of DVT (deep vein thrombosis)    Chronic anticoagulation    Acute respiratory failure with hypoxemia (HCC)        ASSESSMENT of Current Deficits Patient exhibits decreased strength, balance and endurance impairing functional mobility, transfers, gait , gait distance and tolerance to activity. Pt required increased time for all mobility and was mild-moderately unsteady upon standing and side stepping along bed. After standing the first time, B knees buckled for pt requiring her to abruptly sit back down on the bed. Pt agreeable to seated exercises sitting EOB following side stepping. PHYSICAL THERAPY  PLAN OF CARE       Physical therapy plan of care is established based on physician order,  patient diagnosis and clinical assessment    Current Treatment Recommendations:    -Bed Mobility: Lower extremity exercises  and Trunk control activities   -Sitting Balance: Incorporate reaching activities to activate trunk muscles , Hands on support to maintain midline , Facilitate active trunk muscle engagement , Facilitate postural control in all planes  and Engage in core activities to allow for movement within base of support   -Standing Balance: Perform strengthening exercises in standing to promote motor control with or without upper extremity support , Instruct patient on adequate base of support to maintain balance and Challenge balance utilizing reaching  activities beyond center of gravity    -Transfers: Provide instruction on proper hand and foot position for adequate transfer of weight onto lower extremities and use of gait device if needed, Cues for hand placement, technique and safety.  Provide stabilization to prevent fall , Facilitate weight shift forward on to lower extremities and provide necessary stabilization of bilateral lower extremities , Support transfer of weight 1/15/2022    DIAGNOSTIC  LAPAROSCOPY LYSIS OF ADHESIONS performed by Danelle Shelton MD at [address] CATH LAB PROCEDURE  2006    normal coronaries and 1996 normal coronaries    DIALYSIS FISTULA CREATION  march and july 2012    phase one and two patient will start dialysis when needed   [address]    transfemoral venous bypass grafts    IR IVC FILTER PLACEMENT W IMAGING  2/26/2022    IR IVC FILTER PLACEMENT W IMAGING 2/26/2022 [facility] SPECIAL PROCEDURES    KIDNEY TRANSPLANT  2016    KIDNEY TRANSPLANT  2015    KIDNEY TRANSPLANT  2015    PARATHYROIDECTOMY  2006    left parathyroid  implant and parathyroidectomy    TRANSESOPHAGEAL ECHOCARDIOGRAM N/A 2/28/2022    TRANSESOPHAGEAL ECHOCARDIOGRAM WITH BUBBLE STUDY performed by Agnes Yin MD at [address]:    Precautions: Up with assistance, falls, O2 and B knees buckle   Social history: Patient coming from [address] [place] [address] owned: [employer] and [address]   How much difficulty turning over in bed?: A Little  How much difficulty sitting down on / standing up from a chair with arms?: A Lot  How much difficulty moving from lying on back to sitting on side of bed?: A Little  How much help from another person moving to and from a bed to a chair?: A Lot  How much help from another person needed to walk in hospital room?: Total  How much help from another person for climbing 3-5 steps with a railing?: Total  AM-PAC Inpatient Mobility Raw Score : 12  AM-PAC Inpatient T-Scale Score : 35.33  Mobility Inpatient CMS 0-100% Score: 68.66  Mobility Inpatient CMS G-Code Modifier : CL    Nursing cleared patient for PT evaluation. The admitting diagnosis and active problem list as listed above have been reviewed prior to the initiation of this evaluation.     OBJECTIVE;   Initial Evaluation  Date: 4/22/2022 Treatment Date:     Short Term/ Long Term   Goals   Was pt agreeable to Eval/treatment? Yes  To be met in 2 days   Pain level   0/10       Bed Mobility    Rolling: Minimal assist of 1    Supine to sit: Minimal assist of 1    Sit to supine: Minimal assist of 1    Scooting: Minimal assist of 1    Rolling: Independent    Supine to sit: Independent    Sit to supine: Independent    Scooting: Independent     Transfers Sit to stand: Moderate assist of 1   Sit to stand: SBA    Ambulation    10 side steps using  wheeled walker with Moderate assist of 1   for walker control, walker approximation, balance, upright and safety   > 25 feet using  wheeled walker with Minimal assist of 1    Stair negotiation: ascended and descended   Not assessed       ROM Within functional limits    Increase range of motion 10% of affected joints    Strength BUE:  refer to OT eval  RLE:  3+/5  LLE:  3+/5  Increase strength in affected mm groups by 1/3 grade   Balance Sitting EOB:  fair   Dynamic Standing:  fair   Sitting EOB:  fair +  Dynamic Standing: fair +     Patient is Alert & Oriented x person, place, time and situation and follows directions    Sensation:  Patient  denies numbness/tingling   Edema:  no   Endurance: fair  -    Vitals: 2 liters nasal cannula   Blood Pressure at rest  Blood Pressure during session    Heart Rate at rest  Heart Rate during session    SPO2 at rest 99%  SPO2 during session 95-99%     Patient education  Patient educated on role of Physical Therapy, risks of immobility, safety and plan of care, energy conservation,  importance of mobility while in hospital , purse lip breathing, ankle pumps, quad set and glut set for edema control, blood clot prevention, importance and purpose of adaptive device and adjusted to proper height for the patient. , safety  and O2 line management and safety      Patient response to education:   Pt verbalized understanding Pt demonstrated skill Pt requires further education in this area   Yes Partial Yes      Treatment: Patient practiced and was instructed/facilitated in the following treatment: Patient Sat edge of bed 15 minutes with Supervision  to increase dynamic sitting balance and activity tolerance. Pt performed bed mobility, transfers, side stepped along bed, seated exercises sitting EOB. Therapeutic Exercises:  ankle pumps, heel raises, long arc quad and seated marching  x 10 reps x 2 sets       At end of session, patient in bed with     call light and phone within reach,  all lines and tubes intact, nursing notified. Patient would benefit from continued skilled Physical Therapy to improve functional independence and quality of life. Patient's/ family goals   rehab    Time in  1051  Time out  1127    Total Treatment Time  16 minutes    Evaluation time includes thorough review of current medical information, gathering information on past medical history/social history and prior level of function, completion of standardized testing/informal observation of tasks, assessment of data, and development of Plan of care and goals.      CPT codes:  Low Complexity PT evaluation (45129)  Therapeutic activities (93185)   16 minutes  1 unit(s)    Jonathan Smith PT

## 2022-04-23 LAB
ALBUMIN SERPL-MCNC: 2.8 G/DL (ref 3.5–5.2)
ALP BLD-CCNC: 47 U/L (ref 35–104)
ALT SERPL-CCNC: 6 U/L (ref 0–32)
ANION GAP SERPL CALCULATED.3IONS-SCNC: 9 MMOL/L (ref 7–16)
ANISOCYTOSIS: ABNORMAL
AST SERPL-CCNC: 8 U/L (ref 0–31)
BASOPHILIC STIPPLING: ABNORMAL
BASOPHILS ABSOLUTE: 0 E9/L (ref 0–0.2)
BASOPHILS RELATIVE PERCENT: 0.2 % (ref 0–2)
BILIRUB SERPL-MCNC: 0.4 MG/DL (ref 0–1.2)
BUN BLDV-MCNC: 25 MG/DL (ref 6–23)
CALCIUM SERPL-MCNC: 9.5 MG/DL (ref 8.6–10.2)
CHLORIDE BLD-SCNC: 105 MMOL/L (ref 98–107)
CO2: 25 MMOL/L (ref 22–29)
CREAT SERPL-MCNC: 1.4 MG/DL (ref 0.5–1)
EOSINOPHILS ABSOLUTE: 0.15 E9/L (ref 0.05–0.5)
EOSINOPHILS RELATIVE PERCENT: 2.6 % (ref 0–6)
FERRITIN: 788 NG/ML
FOLATE: >20 NG/ML (ref 4.8–24.2)
GFR AFRICAN AMERICAN: 46
GFR NON-AFRICAN AMERICAN: 38 ML/MIN/1.73
GLUCOSE BLD-MCNC: 74 MG/DL (ref 74–99)
HCT VFR BLD CALC: 26.4 % (ref 34–48)
HEMOGLOBIN: 7.9 G/DL (ref 11.5–15.5)
IRON SATURATION: 30 % (ref 15–50)
IRON: 39 MCG/DL (ref 37–145)
LYMPHOCYTES ABSOLUTE: 0.77 E9/L (ref 1.5–4)
LYMPHOCYTES RELATIVE PERCENT: 13 % (ref 20–42)
MCH RBC QN AUTO: 32.8 PG (ref 26–35)
MCHC RBC AUTO-ENTMCNC: 29.9 % (ref 32–34.5)
MCV RBC AUTO: 109.5 FL (ref 80–99.9)
MONOCYTES ABSOLUTE: 0.3 E9/L (ref 0.1–0.95)
MONOCYTES RELATIVE PERCENT: 5.2 % (ref 2–12)
NEUTROPHILS ABSOLUTE: 4.66 E9/L (ref 1.8–7.3)
NEUTROPHILS RELATIVE PERCENT: 79.1 % (ref 43–80)
OVALOCYTES: ABNORMAL
PDW BLD-RTO: 19 FL (ref 11.5–15)
PLATELET # BLD: 141 E9/L (ref 130–450)
PMV BLD AUTO: 11.9 FL (ref 7–12)
POIKILOCYTES: ABNORMAL
POLYCHROMASIA: ABNORMAL
POTASSIUM SERPL-SCNC: 3.6 MMOL/L (ref 3.5–5)
RBC # BLD: 2.41 E12/L (ref 3.5–5.5)
SODIUM BLD-SCNC: 139 MMOL/L (ref 132–146)
TOTAL IRON BINDING CAPACITY: 132 MCG/DL (ref 250–450)
TOTAL PROTEIN: 4.8 G/DL (ref 6.4–8.3)
VITAMIN B-12: 634 PG/ML (ref 211–946)
WBC # BLD: 5.9 E9/L (ref 4.5–11.5)

## 2022-04-23 PROCEDURE — 6360000002 HC RX W HCPCS: Performed by: INTERNAL MEDICINE

## 2022-04-23 PROCEDURE — 6370000000 HC RX 637 (ALT 250 FOR IP): Performed by: INTERNAL MEDICINE

## 2022-04-23 PROCEDURE — 36415 COLL VENOUS BLD VENIPUNCTURE: CPT

## 2022-04-23 PROCEDURE — 2580000003 HC RX 258: Performed by: SPECIALIST

## 2022-04-23 PROCEDURE — 82607 VITAMIN B-12: CPT

## 2022-04-23 PROCEDURE — 83550 IRON BINDING TEST: CPT

## 2022-04-23 PROCEDURE — 83540 ASSAY OF IRON: CPT

## 2022-04-23 PROCEDURE — 36592 COLLECT BLOOD FROM PICC: CPT

## 2022-04-23 PROCEDURE — 6370000000 HC RX 637 (ALT 250 FOR IP): Performed by: SPECIALIST

## 2022-04-23 PROCEDURE — 2060000000 HC ICU INTERMEDIATE R&B

## 2022-04-23 PROCEDURE — 6360000002 HC RX W HCPCS: Performed by: SPECIALIST

## 2022-04-23 PROCEDURE — 85025 COMPLETE CBC W/AUTO DIFF WBC: CPT

## 2022-04-23 PROCEDURE — 82746 ASSAY OF FOLIC ACID SERUM: CPT

## 2022-04-23 PROCEDURE — 2580000003 HC RX 258: Performed by: INTERNAL MEDICINE

## 2022-04-23 PROCEDURE — 80053 COMPREHEN METABOLIC PANEL: CPT

## 2022-04-23 PROCEDURE — 82728 ASSAY OF FERRITIN: CPT

## 2022-04-23 RX ADMIN — MEROPENEM 1000 MG: 1 INJECTION INTRAVENOUS at 12:41

## 2022-04-23 RX ADMIN — ANTI-FUNGAL POWDER MICONAZOLE NITRATE TALC FREE: 1.42 POWDER TOPICAL at 08:57

## 2022-04-23 RX ADMIN — ACYCLOVIR 400 MG: 400 TABLET ORAL at 21:25

## 2022-04-23 RX ADMIN — SODIUM BICARBONATE 650 MG TABLET 650 MG: at 08:55

## 2022-04-23 RX ADMIN — Medication 1000 UNITS: at 08:55

## 2022-04-23 RX ADMIN — METOPROLOL TARTRATE 12.5 MG: 25 TABLET, FILM COATED ORAL at 08:55

## 2022-04-23 RX ADMIN — MYCOPHENOLATE MOFETIL 1000 MG: 250 CAPSULE ORAL at 21:25

## 2022-04-23 RX ADMIN — ANTI-FUNGAL POWDER MICONAZOLE NITRATE TALC FREE: 1.42 POWDER TOPICAL at 21:26

## 2022-04-23 RX ADMIN — CYCLOSPORINE 100 MG: 25 CAPSULE, GELATIN COATED ORAL at 21:26

## 2022-04-23 RX ADMIN — MYCOPHENOLATE MOFETIL 1000 MG: 250 CAPSULE ORAL at 08:56

## 2022-04-23 RX ADMIN — CYCLOSPORINE 100 MG: 25 CAPSULE, GELATIN COATED ORAL at 09:03

## 2022-04-23 RX ADMIN — ONDANSETRON HYDROCHLORIDE 4 MG: 4 TABLET, FILM COATED ORAL at 12:38

## 2022-04-23 RX ADMIN — METOPROLOL TARTRATE 12.5 MG: 25 TABLET, FILM COATED ORAL at 21:25

## 2022-04-23 RX ADMIN — PANTOPRAZOLE SODIUM 40 MG: 40 TABLET, DELAYED RELEASE ORAL at 06:11

## 2022-04-23 RX ADMIN — APIXABAN 5 MG: 5 TABLET, FILM COATED ORAL at 21:25

## 2022-04-23 RX ADMIN — APIXABAN 5 MG: 5 TABLET, FILM COATED ORAL at 08:55

## 2022-04-23 RX ADMIN — PREDNISONE 5 MG: 5 TABLET ORAL at 08:55

## 2022-04-23 RX ADMIN — MEROPENEM 1000 MG: 1 INJECTION INTRAVENOUS at 01:31

## 2022-04-23 RX ADMIN — Medication 1 CAPSULE: at 12:39

## 2022-04-23 RX ADMIN — ACYCLOVIR 400 MG: 400 TABLET ORAL at 08:56

## 2022-04-23 RX ADMIN — Medication 125 MG: at 09:03

## 2022-04-23 RX ADMIN — ONDANSETRON HYDROCHLORIDE 4 MG: 4 TABLET, FILM COATED ORAL at 21:26

## 2022-04-23 RX ADMIN — DESVENLAFAXINE 50 MG: 50 TABLET, EXTENDED RELEASE ORAL at 17:05

## 2022-04-23 RX ADMIN — SODIUM CHLORIDE: 9 INJECTION, SOLUTION INTRAVENOUS at 13:27

## 2022-04-23 RX ADMIN — CLOZAPINE 50 MG: 25 TABLET ORAL at 21:25

## 2022-04-23 RX ADMIN — ALLOPURINOL 100 MG: 100 TABLET ORAL at 08:55

## 2022-04-23 RX ADMIN — FOLIC ACID 1 MG: 1 TABLET ORAL at 08:55

## 2022-04-23 RX ADMIN — ATORVASTATIN CALCIUM 10 MG: 10 TABLET, FILM COATED ORAL at 21:25

## 2022-04-23 RX ADMIN — Medication 125 MG: at 21:30

## 2022-04-23 RX ADMIN — LEVOTHYROXINE SODIUM 50 MCG: 0.05 TABLET ORAL at 06:11

## 2022-04-23 RX ADMIN — SIMETHICONE 160 MG: 80 TABLET, CHEWABLE ORAL at 17:05

## 2022-04-23 ASSESSMENT — PAIN SCALES - WONG BAKER
WONGBAKER_NUMERICALRESPONSE: 0
WONGBAKER_NUMERICALRESPONSE: 0

## 2022-04-23 ASSESSMENT — PAIN SCALES - GENERAL
PAINLEVEL_OUTOF10: 0

## 2022-04-23 ASSESSMENT — ENCOUNTER SYMPTOMS: VOMITING: 1

## 2022-04-23 NOTE — PROGRESS NOTES
109.5*   RDW 19.5* 19.6* 19.0*    147 141     CHEMISTRIES:  Recent Labs     04/21/22  1722 04/22/22  0605 04/23/22  0620    141 139   K 4.3 4.0 3.6    104 105   CO2 24 25 25   BUN 30* 32* 25*   CREATININE 1.6* 1.7* 1.4*   GLUCOSE 124* 119* 74     PT/INR:No results for input(s): PROTIME, INR in the last 72 hours. APTT:No results for input(s): APTT in the last 72 hours. LIVER PROFILE:  Recent Labs     04/21/22  1722 04/22/22  0605 04/23/22  0620   AST 9 8 8   ALT 7 5 6   BILITOT 0.7 0.6 0.4   ALKPHOS 66 50 47       Imaging Last 24 Hours:  XR CHEST PORTABLE    Result Date: 4/21/2022  EXAMINATION: ONE XRAY VIEW OF THE CHEST 4/21/2022 5:29 pm COMPARISON: 03/16/2022 HISTORY: ORDERING SYSTEM PROVIDED HISTORY: r/out PNA TECHNOLOGIST PROVIDED HISTORY: Reason for exam:->r/out PNA FINDINGS: The lungs are without acute focal process. There is no effusion or pneumothorax. Stable heart size. The osseous structures are without acute process. Right internal jugular line tip in the distal SVC. No evidence for pneumonia. Assessment//Plan           Hospital Problems           Last Modified POA    * (Principal) Sepsis ([facility] [address] 4/21/2022 Yes        Assessment:    Condition: In stable condition. Improving. (UTI / sepsis  Await culture report on meropenum per ID . Hypotension better , will d/c fluids   Chronic renal failure creatinine to baseline . S/p renal transplant 2015 , immunocompromised   dvt in the left LL , on eliquis , and had ivc filter for possible GI bleed then . H/o  c diff colitis last admission still on tapering platt of  vanc .  hyperlipid continue lipitor   Hypothyroidism continue levothyroxine. bipolar disorder on clozaril and prestiq stable    Chronic diastolic dysfunction CHF compensated. Chronic anemia  Monitor and check iron studies , and will transfuse  If hb less than 7     ).        Electronically signed by Luiz Macedo MD on 4/23/22 at 2:07 PM EDT

## 2022-04-23 NOTE — PLAN OF CARE
Problem: Pain  Goal: Verbalizes/displays adequate comfort level or baseline comfort level  Outcome: Progressing     Problem: Chronic Conditions and Co-morbidities  Goal: Patient's chronic conditions and co-morbidity symptoms are monitored and maintained or improved  Outcome: Progressing

## 2022-04-23 NOTE — PROGRESS NOTES
loss  ALLERGIES:    No urticaria, hay fever,    EYES:     No blurry vision, loss of vision, eye pain  ENT:      No hearing loss, sore throat  CARDIOVASCULAR:  No chest pain or palpitations  RESPIRATORY:   No cough, sob  ENDOCRINE:    No increase thirst, urination   HEME-LYMPH:   No easy bruising or bleeding  GI:     No nausea, vomiting or diarrhea  :       urinary complaints  NEURO:    No seizures, stroke, HA  MUSCULOSKELETAL:  No muscle aches or pain, no joint pain  SKIN:     No rash or itch  PSYCH:    No depression or anxiety    Medications Prior to Admission: diphenhydrAMINE (BENADRYL) 25 MG tablet, Take 25 mg by mouth every 6 hours as needed for Itching or Allergies  cloZAPine (CLOZARIL) 50 MG tablet, Take 50 mg by mouth nightly  hydroCHLOROthiazide (HYDRODIURIL) 25 MG tablet, Take 25 mg by mouth daily  nystatin (MYCOSTATIN) 640309 UNIT/GM powder, Apply 1 each topically 3 times daily Apply to Groin  ondansetron (ZOFRAN) 4 MG tablet, Take 4 mg by mouth every 8 hours as needed for Nausea or Vomiting  epoetin casey-epbx (RETACRIT) 2000 UNIT/ML injection, Inject 2,000 Units into the skin See Admin Instructions Every 6 days  acetaminophen (TYLENOL) 325 MG tablet, Take 650 mg by mouth every 4 hours as needed for Pain or Fever   vancomycin (VANCOCIN) 125 MG capsule, Take 125 mg by mouth in the morning and at bedtime Twice daily for 10 days.   1st dose administered evening of 04/14/2022  vitamin D (CHOLECALCIFEROL) 25 MCG (1000 UT) TABS tablet, Take 1,000 Units by mouth daily  acyclovir (ZOVIRAX) 400 MG tablet, Take 400 mg by mouth 2 times daily  midodrine (PROAMATINE) 5 MG tablet, Take 1 tablet by mouth 2 times daily as needed (For SBP < 95)  simethicone (MYLICON) 80 MG chewable tablet, Take 2 tablets by mouth 3 times daily as needed (cramping)  sodium bicarbonate 650 MG tablet, Take 1 tablet by mouth daily  magnesium oxide (MAG-OX) 400 MG tablet, Take 1 tablet by mouth daily  metoprolol tartrate (LOPRESSOR) 25 MG tablet, Take 0.5 tablets by mouth 2 times daily  pantoprazole (PROTONIX) 40 MG tablet, Take 1 tablet by mouth every morning (before breakfast)  apixaban (ELIQUIS) 5 MG TABS tablet, Take 5 mg by mouth 2 times daily   atorvastatin (LIPITOR) 10 MG tablet, Take 10 mg by mouth at bedtime   ipratropium-albuterol (DUONEB) 0.5-2.5 (3) MG/3ML SOLN nebulizer solution, Inhale 3 mLs into the lungs every 6 hours as needed for Shortness of Breath  allopurinol (ZYLOPRIM) 100 MG tablet, Take 100 mg by mouth daily  Cyanocobalamin (B-12 COMPLIANCE INJECTION) 1000 MCG/ML KIT, Inject 1,000 mcg as directed every 28 days   desvenlafaxine succinate (PRISTIQ) 50 MG TB24 extended release tablet, Take 1 tablet by mouth every evening  cycloSPORINE (SANDIMMUNE) 100 MG capsule, Take 100 mg by mouth 2 times daily  predniSONE (DELTASONE) 5 MG tablet, Take 5 mg by mouth every morning   folic acid (FOLVITE) 1 MG tablet, Take 1 mg by mouth daily  docusate sodium (COLACE) 100 MG capsule, Take 100 mg by mouth 2 times daily  mycophenolate (CELLCEPT) 500 MG tablet, Take 1,000 mg by mouth 2 times daily  levothyroxine (SYNTHROID) 50 MCG tablet, Take 50 mcg by mouth daily.     CURRENT MEDICATIONS     Current Facility-Administered Medications:     mycophenolate (CELLCEPT) capsule 1,000 mg, 1,000 mg, Oral, BID, Susannah Ramirez MD, 1,000 mg at 04/23/22 0856    cycloSPORINE (SANDIMMUNE) capsule 100 mg, 100 mg, Oral, BID, Susannah Ramirez MD, 100 mg at 04/23/22 0903    meropenem (MERREM) 1,000 mg in sodium chloride 0.9 % 100 mL IVPB (mini-bag), 1,000 mg, IntraVENous, Q12H, Valentin Ramos MD, Stopped at 04/23/22 0201    lactobacillus (CULTURELLE) capsule 1 capsule, 1 capsule, Oral, Q12H, Valentin Ramos MD, 1 capsule at 04/22/22 2241    acetaminophen (TYLENOL) tablet 650 mg, 650 mg, Oral, Q4H PRN, Brandi Mcghee MD, 650 mg at 04/22/22 0135    acyclovir (ZOVIRAX) tablet 400 mg, 400 mg, Oral, BID, Brandi Mcghee MD, 400 mg at 04/23/22 0856    allopurinol (ZYLOPRIM) tablet 100 mg, 100 mg, Oral, Daily, Susannah Ramirez MD, 100 mg at 04/23/22 0855    apixaban (ELIQUIS) tablet 5 mg, 5 mg, Oral, BID, Susannah Ramirez MD, 5 mg at 04/23/22 7080    atorvastatin (LIPITOR) tablet 10 mg, 10 mg, Oral, Nightly, Susannah Ramirez MD, 10 mg at 04/22/22 2236    cloZAPine (CLOZARIL) tablet 50 mg, 50 mg, Oral, Nightly, Susannah Ramirez MD, 50 mg at 04/22/22 2237    desvenlafaxine succinate (PRISTIQ) extended release tablet 50 mg, 50 mg, Oral, QPM, Leonardo Ngo MD, 50 mg at 04/22/22 5844    docusate sodium (COLACE) capsule 100 mg, 100 mg, Oral, BID PRN, Leonardo Ngo MD    folic acid (FOLVITE) tablet 1 mg, 1 mg, Oral, Daily, Leonardo Ngo MD, 1 mg at 04/23/22 0855    ipratropium-albuterol (DUONEB) nebulizer solution 3 mL, 3 mL, Inhalation, Q6H PRN, Leonardo Ngo MD    levothyroxine (SYNTHROID) tablet 50 mcg, 50 mcg, Oral, Daily, Leonardo Ngo MD, 50 mcg at 04/23/22 8863    metoprolol tartrate (LOPRESSOR) tablet 12.5 mg, 12.5 mg, Oral, BID, Leonardo Ngo MD, 12.5 mg at 04/23/22 0855    midodrine (PROAMATINE) tablet 5 mg, 5 mg, Oral, BID PRN, Cass Ramirez MD    miconazole (MICOTIN) 2 % powder, , Topical, BID, Leonardo Ngo MD, Given at 04/23/22 0857    ondansetron (ZOFRAN) tablet 4 mg, 4 mg, Oral, Q8H PRN, Leonardo Ngo MD, 4 mg at 04/22/22 0020    pantoprazole (PROTONIX) tablet 40 mg, 40 mg, Oral, QAM AC, Susannah Ramirez MD, 40 mg at 04/23/22 0608    predniSONE (DELTASONE) tablet 5 mg, 5 mg, Oral, QAM, Cass Ramirez MD, 5 mg at 04/23/22 3853    simethicone (MYLICON) chewable tablet 160 mg, 160 mg, Oral, TID PRN, Leonardo Ngo MD    sodium bicarbonate tablet 650 mg, 650 mg, Oral, Daily, Leonardo Ngo MD, 650 mg at 04/23/22 0855    vancomycin (VANCOCIN) oral solution 125 mg, 125 mg, Oral, BID, Leonardo Ngo MD, 125 mg at 04/23/22 7457    vitamin D (CHOLECALCIFEROL) tablet 1,000 Units, 1,000 Units, Oral, Daily, Leonardo Ngo MD, 1,000 Units at 22 0855    0.9 % sodium chloride infusion, , IntraVENous, Continuous, Aldona Habermann, MD, Last Rate: 50 mL/hr at 22 0737, Rate Verify at 22 0737  ALLERGIES     Macrodantin [nitrofurantoin macrocrystal] and Sulfa antibiotics  Immunization History   Administered Date(s) Administered    COVID-19, Pfizer Purple top, DILUTE for use, 12+ yrs, 30mcg/0.3mL dose 2021, 2021, 2021      Internal Administration   First Dose COVID-19, [employer] top, DILUTE for use, 12+ yrs, 30mcg/0.3mL dose  2021   Second Dose COVID-19, Pfizer Purple top, DILUTE for use, 12+ yrs, 30mcg/0.3mL dose   2021       Last COVID Lab SARS-CoV-2 (no units)   Date Value   2021 Not Detected     SARS-CoV-2 Antibody, Total (no units)   Date Value   2022 Non-Reactive     SARS-CoV-2, PCR (no units)   Date Value   2022 Not Detected     SARS-CoV-2, NAAT (no units)   Date Value   2022 Not Detected            PAST MEDICAL HISTORY     Past Medical History:   Diagnosis Date    Abnormal EKG     left bundle branch block    Acute gout involving toe of right foot 2020    Acute on chronic combined systolic (congestive) and diastolic (congestive) heart failure (HCC)     Cancer (HCC)     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    Cerebrovascular disease     Clotting disorder ([address] 1985    thrombophlebitis after c section delivery    Depression     15 years followed by dr Sid Green Hemod[facility] patient [facility])     History of blood transfusion     Hyperlipidemia     Hypertension     Hypothyroidism     Leg swelling 2020    Lymphedema of both lower extremities 2020    Peripheral vascular disease ([address]     Renal failure 2012    renal transplant    Thrombophlebitis     after c section delivery    Thyroid disease      SURGICAL HISTORY       Past Surgical History:   Procedure Laterality Date     SECTION      one normal delivery    COLECTOMY N/A 1/15/2022    DIAGNOSTIC  LAPAROSCOPY LYSIS OF ADHESIONS performed by Kamila Laird MD at [address] [facility] [address] CATH LAB PROCEDURE  2006    normal coronaries and 1996 normal coronaries    DIALYSIS FISTULA CREATION  march and july 2012    phase one and two patient will start dialysis when needed   [address]    transfemoral venous bypass grafts    IR IVC FILTER PLACEMENT W IMAGING  2/26/2022    IR IVC FILTER PLACEMENT W IMAGING 2/26/2022 [facility] SPECIAL PROCEDURES    KIDNEY TRANSPLANT  2016    KIDNEY TRANSPLANT  2015    KIDNEY TRANSPLANT  2015    PARATHYROIDECTOMY  2006    left parathyroid  implant and parathyroidectomy    TRANSESOPHAGEAL ECHOCARDIOGRAM N/A 2/28/2022    TRANSESOPHAGEAL ECHOCARDIOGRAM WITH BUBBLE STUDY performed by Annie Braga MD at [address] [facility]       Family History   Problem Relation Age of Onset    Diabetes Mother     Diabetes Father     Dementia Father      ·      PHYSICAL EXAM          Vitals:    04/22/22 1945 04/22/22 2245 04/23/22 0739 04/23/22 0740   BP: 123/81 (!) 117/59 110/68    Pulse: 85 83 77    Resp: 16 23 18    Temp: 98.7 °F (37.1 °C) 98.2 °F (36.8 °C) 98.1 °F (36.7 °C)    TempSrc: Infrared Infrared Infrared    SpO2: 98% 95% 100% 99%   Weight:       Height:         Physical Exam   Constitutional/General: Alert and oriented, NAD  Head: NC/AT  Eyes: PERRL, EOMI  Neck: Supple, full ROM,    Pulmonary: Lungs dec  to auscultation bilaterally. \ ON RA  Cardiovascular:  Regular rate and rhythm, no murmurs, gallops, or rubs. Abdomen: Soft, + BS.    distension. Nontender.     Extremities:  Warm and well perfused EDEMA   Pulses:  Distal pulses intact  Skin: Warm and dry without rash  Neurologic:    No focal deficits  Psych: Normal Affect  EXTERNAL CATH      DIAGNOSTIC RESULTS   RADIOLOGY:   XR CHEST PORTABLE    Result Date: 4/21/2022  EXAMINATION: ONE XRAY VIEW OF THE CHEST 4/21/2022 5:29 pm COMPARISON: 03/16/2022 HISTORY: ORDERING SYSTEM PROVIDED HISTORY: r/out PNA TECHNOLOGIST PROVIDED HISTORY: Reason for exam:->r/out PNA FINDINGS: The lungs are without acute focal process. There is no effusion or pneumothorax. Stable heart size. The osseous structures are without acute process. Right internal jugular line tip in the distal SVC. No evidence for pneumonia. LABS  Recent Labs     04/21/22 1722 04/22/22  0605 04/23/22  0620   WBC 8.6 8.2 5.9   HGB 11.0* 8.8* 7.9*   HCT 36.1 29.2* 26.4*   .1* 110.6* 109.5*    147 141     Recent Labs     04/21/22 1722 04/21/22 1722 04/22/22  0605 04/22/22  0605 04/23/22  0620     --  141  --  139   K 4.3  --  4.0   < > 3.6      < > 104   < > 105   CO2 24   < > 25   < > 25   BUN 30*  --  32*  --  25*   CREATININE 1.6*  --  1.7*  --  1.4*   GFRAA 39  --  36  --  46   LABGLOM 32  --  30  --  38   GLUCOSE 124*  --  119*  --  74   PROT 6.5  --  5.4*  --  4.8*   LABALBU 3.6  --  2.9*  --  2.8*   CALCIUM 10.6*  --  9.8  --  9.5   BILITOT 0.7  --  0.6  --  0.4   ALKPHOS 66  --  50  --  47   AST 9  --  8  --  8   ALT 7  --  5  --  6    < > = values in this interval not displayed. No results for input(s): PROCAL in the last 72 hours.   Lab Results   Component Value Date    CRP 18.3 (H) 01/23/2022    CRP 34.6 (H) 11/17/2021     Lab Results   Component Value Date    SEDRATE 61 (H) 01/23/2022    SEDRATE 62 (H) 11/20/2021     No results found for: [id number]  Lab Results   Component Value Date    ADVIRPCR Not Detected 02/25/2022    BPARAPPCR Not Detected 02/25/2022    BPPTXPPCR Not Detected 02/25/2022    CHLPNEPCR Not Detected 02/25/2022    [id number] Not Detected 02/25/2022    JSBHXS1XZB Not Detected 02/25/2022    RNLIE26HKP Not Detected 02/25/2022    LOMEG79YAM Not Detected 02/25/2022    COVID19 Not Detected 04/21/2022    COVID19 Not Detected 02/25/2022    [facility] Not Detected 02/25/2022    RHENTPCR Not Detected 02/25/2022    FLUBPCR Not Detected 04/21/2022 [facility] Not Detected 02/25/2022    GNEDLWX1IOG Not Detected 02/25/2022    NPNAJPJ4NHB Not Detected 02/25/2022    BHXAYJG1WBW Not Detected 02/25/2022    EJBULSM3JIB Not Detected 02/25/2022    RSVPCR Not Detected 02/25/2022          Lab Results   Component Value Date    COVID19 Not Detected 04/21/2022    COVID19 Not Detected 02/25/2022     COVID-19/MARINA-COV2 LABS  Recent Labs     04/21/22  1722 04/22/22  0605 04/23/22  0620   AST 9 8 8   ALT 7 5 6        MICROBIOLOGY:          Lab Results   Component Value Date    BC 24 Hours no growth 04/21/2022    BC 5 Days no growth 03/09/2022    BC 5 Days no growth 02/27/2022    ORG Pseudomonas aeruginosa 03/09/2022    ORG Staphylococcus coagulase-negative 02/25/2022    ORG Enterococcus faecalis 01/12/2022          WOUND/ABSCESS   Date Value Ref Range Status   01/24/2022 Growth not present  Final        Urine Culture, Routine   Date Value Ref Range Status   03/09/2022 75,000 CFU/ml  Final   02/26/2022 Growth not present  Final   01/23/2022 Growth not present  Final     MRSA Culture Only   Date Value Ref Range Status   11/17/2021 Methicillin resistant Staph aureus not isolated  Final          FINAL IMPRESSION    Patient is a 78 y.o. female who presented with   Chief Complaint   Patient presents with    Emesis     pt sent here from nursing home, vomitting onset today    and admitted for Sepsis ([facility] [address] [A41.9]  Kidney transplant recipient [Z94.0]  Chronic renal impairment, unspecified CKD stage [N18.9]  Sepsis without acute organ dysfunction, due to unspecified organism ([facility] [address] [A41.9]   IMMUNOCOMPROMISED PT  AFEBRILE      H/O CDIFF   H/o Pseudomonas bacteriuria   CONT MEROPENEM PLAN 8 DAYS    Check cultures PENDING              Imaging and labs were reviewed per medical records  An opportunity to ask questions was given to the patient/FAMILY and questions were answered. Thank you for involving me in the care of Jeanette Garcia.  Please do not hesitate to call for any questions or concerns.          Electronically signed by Mariam Benitez MD on 4/23/2022 at 12:08 PM

## 2022-04-24 LAB
ALBUMIN SERPL-MCNC: 2.7 G/DL (ref 3.5–5.2)
ALP BLD-CCNC: 45 U/L (ref 35–104)
ALT SERPL-CCNC: 6 U/L (ref 0–32)
ANION GAP SERPL CALCULATED.3IONS-SCNC: 9 MMOL/L (ref 7–16)
ANISOCYTOSIS: ABNORMAL
AST SERPL-CCNC: 8 U/L (ref 0–31)
BASOPHILS ABSOLUTE: 0 E9/L (ref 0–0.2)
BASOPHILS RELATIVE PERCENT: 0.2 % (ref 0–2)
BILIRUB SERPL-MCNC: 0.3 MG/DL (ref 0–1.2)
BUN BLDV-MCNC: 20 MG/DL (ref 6–23)
CALCIUM SERPL-MCNC: 9.4 MG/DL (ref 8.6–10.2)
CHLORIDE BLD-SCNC: 106 MMOL/L (ref 98–107)
CO2: 26 MMOL/L (ref 22–29)
CREAT SERPL-MCNC: 1.2 MG/DL (ref 0.5–1)
EOSINOPHILS ABSOLUTE: 0.19 E9/L (ref 0.05–0.5)
EOSINOPHILS RELATIVE PERCENT: 3.5 % (ref 0–6)
GFR AFRICAN AMERICAN: 54
GFR NON-AFRICAN AMERICAN: 45 ML/MIN/1.73
GLUCOSE BLD-MCNC: 78 MG/DL (ref 74–99)
HCT VFR BLD CALC: 26.6 % (ref 34–48)
HEMOGLOBIN: 8.1 G/DL (ref 11.5–15.5)
LYMPHOCYTES ABSOLUTE: 0.43 E9/L (ref 1.5–4)
LYMPHOCYTES RELATIVE PERCENT: 7.9 % (ref 20–42)
MCH RBC QN AUTO: 33.6 PG (ref 26–35)
MCHC RBC AUTO-ENTMCNC: 30.5 % (ref 32–34.5)
MCV RBC AUTO: 110.4 FL (ref 80–99.9)
MONOCYTES ABSOLUTE: 0.49 E9/L (ref 0.1–0.95)
MONOCYTES RELATIVE PERCENT: 8.8 % (ref 2–12)
NEUTROPHILS ABSOLUTE: 4.32 E9/L (ref 1.8–7.3)
NEUTROPHILS RELATIVE PERCENT: 79.8 % (ref 43–80)
OVALOCYTES: ABNORMAL
PDW BLD-RTO: 18.6 FL (ref 11.5–15)
PLATELET # BLD: 158 E9/L (ref 130–450)
PMV BLD AUTO: 12 FL (ref 7–12)
POIKILOCYTES: ABNORMAL
POLYCHROMASIA: ABNORMAL
POTASSIUM SERPL-SCNC: 3.7 MMOL/L (ref 3.5–5)
RBC # BLD: 2.41 E12/L (ref 3.5–5.5)
SODIUM BLD-SCNC: 141 MMOL/L (ref 132–146)
TOTAL PROTEIN: 4.8 G/DL (ref 6.4–8.3)
URINE CULTURE, ROUTINE: NORMAL
WBC # BLD: 5.4 E9/L (ref 4.5–11.5)

## 2022-04-24 PROCEDURE — 36415 COLL VENOUS BLD VENIPUNCTURE: CPT

## 2022-04-24 PROCEDURE — 6370000000 HC RX 637 (ALT 250 FOR IP): Performed by: INTERNAL MEDICINE

## 2022-04-24 PROCEDURE — 6360000002 HC RX W HCPCS: Performed by: SPECIALIST

## 2022-04-24 PROCEDURE — 80053 COMPREHEN METABOLIC PANEL: CPT

## 2022-04-24 PROCEDURE — 36592 COLLECT BLOOD FROM PICC: CPT

## 2022-04-24 PROCEDURE — 6370000000 HC RX 637 (ALT 250 FOR IP): Performed by: SPECIALIST

## 2022-04-24 PROCEDURE — 6360000002 HC RX W HCPCS: Performed by: INTERNAL MEDICINE

## 2022-04-24 PROCEDURE — 2580000003 HC RX 258: Performed by: INTERNAL MEDICINE

## 2022-04-24 PROCEDURE — 85025 COMPLETE CBC W/AUTO DIFF WBC: CPT

## 2022-04-24 PROCEDURE — 2580000003 HC RX 258: Performed by: SPECIALIST

## 2022-04-24 PROCEDURE — 2060000000 HC ICU INTERMEDIATE R&B

## 2022-04-24 RX ADMIN — ANTI-FUNGAL POWDER MICONAZOLE NITRATE TALC FREE: 1.42 POWDER TOPICAL at 08:27

## 2022-04-24 RX ADMIN — CLOZAPINE 50 MG: 25 TABLET ORAL at 21:32

## 2022-04-24 RX ADMIN — ONDANSETRON HYDROCHLORIDE 4 MG: 4 TABLET, FILM COATED ORAL at 16:41

## 2022-04-24 RX ADMIN — DESVENLAFAXINE 50 MG: 50 TABLET, EXTENDED RELEASE ORAL at 16:41

## 2022-04-24 RX ADMIN — METOPROLOL TARTRATE 12.5 MG: 25 TABLET, FILM COATED ORAL at 21:32

## 2022-04-24 RX ADMIN — ANTI-FUNGAL POWDER MICONAZOLE NITRATE TALC FREE: 1.42 POWDER TOPICAL at 21:34

## 2022-04-24 RX ADMIN — ONDANSETRON HYDROCHLORIDE 4 MG: 4 TABLET, FILM COATED ORAL at 08:42

## 2022-04-24 RX ADMIN — Medication 1 CAPSULE: at 13:11

## 2022-04-24 RX ADMIN — LEVOTHYROXINE SODIUM 50 MCG: 0.05 TABLET ORAL at 05:26

## 2022-04-24 RX ADMIN — MYCOPHENOLATE MOFETIL 1000 MG: 250 CAPSULE ORAL at 08:26

## 2022-04-24 RX ADMIN — MYCOPHENOLATE MOFETIL 1000 MG: 250 CAPSULE ORAL at 21:31

## 2022-04-24 RX ADMIN — PANTOPRAZOLE SODIUM 40 MG: 40 TABLET, DELAYED RELEASE ORAL at 05:26

## 2022-04-24 RX ADMIN — FOLIC ACID 1 MG: 1 TABLET ORAL at 08:27

## 2022-04-24 RX ADMIN — SIMETHICONE 160 MG: 80 TABLET, CHEWABLE ORAL at 14:17

## 2022-04-24 RX ADMIN — PREDNISONE 5 MG: 5 TABLET ORAL at 08:27

## 2022-04-24 RX ADMIN — MIDODRINE HYDROCHLORIDE 5 MG: 5 TABLET ORAL at 08:27

## 2022-04-24 RX ADMIN — Medication 1 CAPSULE: at 01:07

## 2022-04-24 RX ADMIN — MEROPENEM 1000 MG: 1 INJECTION INTRAVENOUS at 13:13

## 2022-04-24 RX ADMIN — ACYCLOVIR 400 MG: 400 TABLET ORAL at 21:31

## 2022-04-24 RX ADMIN — Medication 1000 UNITS: at 08:26

## 2022-04-24 RX ADMIN — ALLOPURINOL 100 MG: 100 TABLET ORAL at 08:27

## 2022-04-24 RX ADMIN — ATORVASTATIN CALCIUM 10 MG: 10 TABLET, FILM COATED ORAL at 21:32

## 2022-04-24 RX ADMIN — Medication 125 MG: at 08:23

## 2022-04-24 RX ADMIN — Medication 125 MG: at 21:36

## 2022-04-24 RX ADMIN — ACYCLOVIR 400 MG: 400 TABLET ORAL at 08:25

## 2022-04-24 RX ADMIN — MEROPENEM 1000 MG: 1 INJECTION INTRAVENOUS at 01:08

## 2022-04-24 RX ADMIN — CYCLOSPORINE 100 MG: 25 CAPSULE, GELATIN COATED ORAL at 08:25

## 2022-04-24 RX ADMIN — CYCLOSPORINE 100 MG: 25 CAPSULE, GELATIN COATED ORAL at 21:31

## 2022-04-24 RX ADMIN — APIXABAN 5 MG: 5 TABLET, FILM COATED ORAL at 21:32

## 2022-04-24 RX ADMIN — APIXABAN 5 MG: 5 TABLET, FILM COATED ORAL at 08:26

## 2022-04-24 RX ADMIN — SODIUM BICARBONATE 650 MG TABLET 650 MG: at 08:26

## 2022-04-24 RX ADMIN — METOPROLOL TARTRATE 12.5 MG: 25 TABLET, FILM COATED ORAL at 08:26

## 2022-04-24 RX ADMIN — SODIUM CHLORIDE: 9 INJECTION, SOLUTION INTRAVENOUS at 08:36

## 2022-04-24 ASSESSMENT — PAIN SCALES - GENERAL
PAINLEVEL_OUTOF10: 0

## 2022-04-24 ASSESSMENT — PAIN SCALES - WONG BAKER
WONGBAKER_NUMERICALRESPONSE: 0

## 2022-04-24 ASSESSMENT — ENCOUNTER SYMPTOMS: VOMITING: 1

## 2022-04-24 NOTE — PROGRESS NOTES
[facility]     [address] [place] [address] 80  [place], [address]  Phone [phone]   [phone]      Admit Date: 2022  4:45 PM  Pt Name: Niru Vail  MRN: [id number]  : 1955  Reason for Consult:    Chief Complaint   Patient presents with    Emesis     pt sent here from nursing home, vomitting onset today     Requesting Physician:  Aldona Habermann, MD  PCP: Aldona Habermann, MD  History Obtained From:  patient, chart   ID consulted for Sepsis [facility]) [A41.9]  Kidney transplant recipient [Z94.0]  Chronic renal impairment, unspecified CKD stage [N18.9]  Sepsis without acute organ dysfunction, due to unspecified organism ([address] [A41.9]  on hospital day [address] [place]       Chief Complaint   Patient presents with    Emesis     pt sent here from nursing home, vomitting onset today     HISTORYOF PRESENT ILLNESS   Niru Vail is a 78 y.o. female who presents with   has a past medical history of Abnormal EKG, Acute gout involving toe of right foot, Acute on chronic combined systolic (congestive) and diastolic (congestive) heart failure ([address] Cancer ([address] Cerebrovascular disease, Clotting disorder ([address] Depression, Hemodialysis patient ([address] History of blood transfusion, Hyperlipidemia, Hypertension, Hypothyroidism, Leg swelling, Lymphedema of both lower extremities, Peripheral vascular disease ([address] Renal failure, Thrombophlebitis, and Thyroid disease.       Emesis       PT CAME IN WITH uti SYMPTOMS SHE DENIES DIARRHEA  HAD N/V  HAS WORKED WITH PT AT [facility]   SHE HAD TXYQ093.5 TC 98  ON 1l  WBC8.2 CR1.7  UA BLOOD NITRITE LE LE BACTERIA   HAS H/O CDIFF 3/10/2022  H/O PSEUDOMONAS BACTERURIA 3/9/2022  CURRENTLY ON      cefTRIAXone (ROCEPHIN) 1,000 mg in sterile water 10 mL IV syringe, Q24H     Dos  22   PT IN BED  Supine alseep but arousable   no hassan   No new c/o      REVIEW OF SYSTEMS     CONSTITUTIONAL:   No fever, chills, weight tablet, Take 0.5 tablets by mouth 2 times daily  pantoprazole (PROTONIX) 40 MG tablet, Take 1 tablet by mouth every morning (before breakfast)  apixaban (ELIQUIS) 5 MG TABS tablet, Take 5 mg by mouth 2 times daily   atorvastatin (LIPITOR) 10 MG tablet, Take 10 mg by mouth at bedtime   ipratropium-albuterol (DUONEB) 0.5-2.5 (3) MG/3ML SOLN nebulizer solution, Inhale 3 mLs into the lungs every 6 hours as needed for Shortness of Breath  allopurinol (ZYLOPRIM) 100 MG tablet, Take 100 mg by mouth daily  Cyanocobalamin (B-12 COMPLIANCE INJECTION) 1000 MCG/ML KIT, Inject 1,000 mcg as directed every 28 days   desvenlafaxine succinate (PRISTIQ) 50 MG TB24 extended release tablet, Take 1 tablet by mouth every evening  cycloSPORINE (SANDIMMUNE) 100 MG capsule, Take 100 mg by mouth 2 times daily  predniSONE (DELTASONE) 5 MG tablet, Take 5 mg by mouth every morning   folic acid (FOLVITE) 1 MG tablet, Take 1 mg by mouth daily  docusate sodium (COLACE) 100 MG capsule, Take 100 mg by mouth 2 times daily  mycophenolate (CELLCEPT) 500 MG tablet, Take 1,000 mg by mouth 2 times daily  levothyroxine (SYNTHROID) 50 MCG tablet, Take 50 mcg by mouth daily.     CURRENT MEDICATIONS     Current Facility-Administered Medications:     mycophenolate (CELLCEPT) capsule 1,000 mg, 1,000 mg, Oral, BID, Susannah Ramirez MD, 1,000 mg at 04/24/22 4403    cycloSPORINE (SANDIMMUNE) capsule 100 mg, 100 mg, Oral, BID, Susannah Ramirez MD, 100 mg at 04/24/22 0825    meropenem (MERREM) 1,000 mg in sodium chloride 0.9 % 100 mL IVPB (mini-bag), 1,000 mg, IntraVENous, Q12H, Richelle Malik MD, Stopped at 04/24/22 0149    lactobacillus (CULTURELLE) capsule 1 capsule, 1 capsule, Oral, Q12H, Richelle Malik MD, 1 capsule at 04/24/22 0107    acetaminophen (TYLENOL) tablet 650 mg, 650 mg, Oral, Q4H PRN, Saad Kumari MD, 650 mg at 04/22/22 0135    acyclovir (ZOVIRAX) tablet 400 mg, 400 mg, Oral, BID, Saad Kumari MD, 400 mg at 04/24/22 0825    allopurinol (ZYLOPRIM) tablet 100 mg, 100 mg, Oral, Daily, Susannah Ramirez MD, 100 mg at 04/24/22 0827    apixaban (ELIQUIS) tablet 5 mg, 5 mg, Oral, BID, Steven Arias MD, 5 mg at 04/24/22 2688    atorvastatin (LIPITOR) tablet 10 mg, 10 mg, Oral, Nightly, Steven Arias MD, 10 mg at 04/23/22 2125    cloZAPine (CLOZARIL) tablet 50 mg, 50 mg, Oral, Nightly, Steven Arias MD, 50 mg at 04/23/22 2125    desvenlafaxine succinate (PRISTIQ) extended release tablet 50 mg, 50 mg, Oral, QPM, Steven Arias MD, 50 mg at 04/23/22 1705    docusate sodium (COLACE) capsule 100 mg, 100 mg, Oral, BID PRN, Steven Arias MD    folic acid (FOLVITE) tablet 1 mg, 1 mg, Oral, Daily, Steven Arias MD, 1 mg at 04/24/22 0827    ipratropium-albuterol (DUONEB) nebulizer solution 3 mL, 3 mL, Inhalation, Q6H PRN, Steven Arias MD    levothyroxine (SYNTHROID) tablet 50 mcg, 50 mcg, Oral, Daily, Stephen Ramirez MD, 50 mcg at 04/24/22 0526    metoprolol tartrate (LOPRESSOR) tablet 12.5 mg, 12.5 mg, Oral, BID, Steven Arias MD, 12.5 mg at 04/24/22 1212    midodrine (PROAMATINE) tablet 5 mg, 5 mg, Oral, BID PRN, Steven Arias MD, 5 mg at 04/24/22 0827    miconazole (MICOTIN) 2 % powder, , Topical, BID, Steven Arias MD, Given at 04/24/22 0827    ondansetron (ZOFRAN) tablet 4 mg, 4 mg, Oral, Q8H PRN, Steven Arias MD, 4 mg at 04/24/22 0842    pantoprazole (PROTONIX) tablet 40 mg, 40 mg, Oral, QAM AC, Susannah Ramirez MD, 40 mg at 04/24/22 6364    predniSONE (DELTASONE) tablet 5 mg, 5 mg, Oral, QAMSusannah MD, 5 mg at 04/24/22 0827    simethicone (MYLICON) chewable tablet 160 mg, 160 mg, Oral, TID PRN, Steven Arias MD, 160 mg at 04/23/22 1705    sodium bicarbonate tablet 650 mg, 650 mg, Oral, Daily, Steven Arias MD, 650 mg at 04/24/22 4434    vancomycin (VANCOCIN) oral solution 125 mg, 125 mg, Oral, BID, Steven Arias MD, 125 mg at 04/24/22 7940    vitamin D (CHOLECALCIFEROL) tablet 1,000 Units, 1,000 Units, Oral, Daily, Pankaj Urias MD, 1,000 Units at 04/24/22 0909  ALLERGIES     Macrodantin [nitrofurantoin macrocrystal] and Sulfa antibiotics  Immunization History   Administered Date(s) Administered    COVID-19, Pfizer Purple top, DILUTE for use, 12+ yrs, 30mcg/0.3mL dose 01/28/2021, 02/18/2021, 08/18/2021      Internal Administration   First Dose COVID-19, Pfizer Purple top, DILUTE for use, 12+ yrs, 30mcg/0.3mL dose  01/28/2021   Second Dose COVID-19, Pfizer Purple top, DILUTE for use, 12+ yrs, 30mcg/0.3mL dose   02/18/2021       Last COVID Lab SARS-CoV-2 (no units)   Date Value   01/20/2021 Not Detected     SARS-CoV-2 Antibody, Total (no units)   Date Value   01/13/2022 Non-Reactive     SARS-CoV-2, PCR (no units)   Date Value   02/25/2022 Not Detected     SARS-CoV-2, NAAT (no units)   Date Value   04/21/2022 Not Detected            PAST MEDICAL HISTORY     Past Medical History:   Diagnosis Date    Abnormal EKG     left bundle branch block    Acute gout involving toe of right foot 09/03/2020    Acute on chronic combined systolic (congestive) and diastolic (congestive) heart failure (HCC)     Cancer (HCC)     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    Cerebrovascular disease     Clotting disorder ([facility] 1985    thrombophlebitis after c section delivery    Depression     15 years followed by dr Robert Freitas Hemodialysis patient [facility])     History of blood transfusion     Hyperlipidemia     Hypertension     Hypothyroidism     Leg swelling 09/03/2020    Lymphedema of both lower extremities 09/03/2020    Peripheral vascular disease ([facility]     Renal failure 11/2012    renal transplant    Thrombophlebitis     after c section delivery    Thyroid disease      SURGICAL HISTORY       Past Surgical History:   Procedure Laterality Date   [address]    one normal delivery    COLECTOMY N/A 1/15/2022    DIAGNOSTIC  LAPAROSCOPY LYSIS OF ADHESIONS performed by Eduar Weeks MD at [address]  COLONOSCOPY      DIAGNOSTIC CARDIAC CATH LAB PROCEDURE  2006    normal coronaries and 1996 normal coronaries    DIALYSIS FISTULA CREATION  march and july 2012    phase one and two patient will start dialysis when needed   [address]    transfemoral venous bypass grafts    IR IVC FILTER PLACEMENT W IMAGING  2/26/2022    IR IVC FILTER PLACEMENT W IMAGING 2/26/2022 [facility] SPECIAL PROCEDURES    KIDNEY TRANSPLANT  2016    KIDNEY TRANSPLANT  2015    KIDNEY TRANSPLANT  2015    PARATHYROIDECTOMY  2006    left parathyroid  implant and parathyroidectomy    TRANSESOPHAGEAL ECHOCARDIOGRAM N/A 2/28/2022    TRANSESOPHAGEAL ECHOCARDIOGRAM WITH BUBBLE STUDY performed by Walter Bundy MD at [address]       Family History   Problem Relation Age of Onset    Diabetes Mother     Diabetes Father     Dementia Father      ·      PHYSICAL EXAM          Vitals:    04/23/22 1445 04/23/22 1915 04/24/22 0100 04/24/22 0730   BP: (!) 111/58 128/72 (!) 141/65 134/61   Pulse: 80 86 91 84   Resp: 20 18 16 20   Temp: 97.8 °F (36.6 °C) 97.9 °F (36.6 °C) 97.8 °F (36.6 °C) 98.4 °F (36.9 °C)   TempSrc: Infrared Infrared Oral Infrared   SpO2: 99% 96% 91% 91%   Weight:       Height:         Physical Exam   Constitutional/General: Alert and oriented   Head: NC/AT   Pulmonary: Lungs dec  to auscultation bilaterally ant   Cardiovascular:  Regular rate and rhythm,  Abdomen: Soft, + BS.    distension. Nontender. Extremities:  Warm and well perfused EDEMA   Skin: Warm and dry   Neurologic:    No focal deficits follow commands  Psych: Normal Affect  tlc     DIAGNOSTIC RESULTS   RADIOLOGY:   XR CHEST PORTABLE    Result Date: 4/21/2022  EXAMINATION: ONE XRAY VIEW OF THE CHEST 4/21/2022 5:29 pm COMPARISON: 03/16/2022 HISTORY: ORDERING SYSTEM PROVIDED HISTORY: r/out PNA TECHNOLOGIST PROVIDED HISTORY: Reason for exam:->r/out PNA FINDINGS: The lungs are without acute focal process.   There is no effusion or pneumothorax. Stable heart size. The osseous structures are without acute process. Right internal jugular line tip in the distal SVC. No evidence for pneumonia. LABS  Recent Labs     04/22/22  0605 04/23/22  0620 04/24/22  0525   WBC 8.2 5.9 5.4   HGB 8.8* 7.9* 8.1*   HCT 29.2* 26.4* 26.6*   .6* 109.5* 110.4*    141 158     Recent Labs     04/22/22  0605 04/22/22  0605 04/23/22  0620 04/23/22  0620 04/24/22  0525     --  139  --  141   K 4.0   < > 3.6   < > 3.7      < > 105   < > 106   CO2 25   < > 25   < > 26   BUN 32*  --  25*  --  20   CREATININE 1.7*  --  1.4*  --  1.2*   GFRAA 36  --  46  --  54   LABGLOM 30  --  38  --  45   GLUCOSE 119*  --  74  --  78   PROT 5.4*  --  4.8*  --  4.8*   LABALBU 2.9*  --  2.8*  --  2.7*   CALCIUM 9.8  --  9.5  --  9.4   BILITOT 0.6  --  0.4  --  0.3   ALKPHOS 50  --  47  --  45   AST 8  --  8  --  8   ALT 5  --  6  --  6    < > = values in this interval not displayed. No results for input(s): PROCAL in the last 72 hours.   Lab Results   Component Value Date    CRP 18.3 (H) 01/23/2022    CRP 34.6 (H) 11/17/2021     Lab Results   Component Value Date    SEDRATE 61 (H) 01/23/2022    SEDRATE 62 (H) 11/20/2021     No results found for: [id number]  Lab Results   Component Value Date    ADVIRPCR Not Detected 02/25/2022    BPARAPPCR Not Detected 02/25/2022    BPPTXPPCR Not Detected 02/25/2022    CHLPNEPCR Not Detected 02/25/2022    XLB098MEKN Not Detected 02/25/2022    MSYBQB1FXY Not Detected 02/25/2022    KNZBH23RMW Not Detected 02/25/2022    HBJHJ19KFF Not Detected 02/25/2022    COVID19 Not Detected 04/21/2022    COVID19 Not Detected 02/25/2022    [facility] SE[facility]EE Not Detected 02/25/2022    RHENTPCR Not Detected 02/25/2022    FLUBPCR Not Detected 04/21/2022    [facility] SYSTEM Not Detected 02/25/2022    ANEJVMR1KXX Not Detected 02/25/2022    BAQRHOS6GRH Not Detected 02/25/2022    CXFQMSZ7MQZ Not Detected 02/25/2022    OPQBYEE5FMG Not Detected 02/25/2022 RSVPCR Not Detected 02/25/2022          Lab Results   Component Value Date    COVID19 Not Detected 04/21/2022    COVID19 Not Detected 02/25/2022     COVID-19/MARINA-COV2 LABS  Recent Labs     04/22/22  0605 04/23/22  0620 04/23/22  1000 04/24/22  0525   FERRITIN  --   --  788  --    AST 8 8  --  8   ALT 5 6  --  6        MICROBIOLOGY:          Lab Results   Component Value Date    BC 24 Hours no growth 04/21/2022    BC 5 Days no growth 03/09/2022    BC 5 Days no growth 02/27/2022    ORG Pseudomonas aeruginosa 03/09/2022    ORG Staphylococcus coagulase-negative 02/25/2022    ORG Enterococcus faecalis 01/12/2022          WOUND/ABSCESS   Date Value Ref Range Status   01/24/2022 Growth not present  Final        Urine Culture, Routine   Date Value Ref Range Status   04/21/2022   Final    10 to 100,000 CFU/mL  Mixed rubio isolated. Further workup and sensitivity testing  is not routinely indicated and will not be performed.   Mixed rubio isolated includes:  Mixed gram negative rods  Gram positive organism     03/09/2022 75,000 CFU/ml  Final   02/26/2022 Growth not present  Final     MRSA Culture Only   Date Value Ref Range Status   11/17/2021 Methicillin resistant Staph aureus not isolated  Final          FINAL IMPRESSION    Patient is a 78 y.o. female who presented with   Chief Complaint   Patient presents with    Emesis     pt sent here from nursing home, vomitting onset today    and admitted for Sepsis ([facility] [A41.9]  Kidney transplant recipient [Z94.0]  Chronic renal impairment, unspecified CKD stage [N18.9]  Sepsis without acute organ dysfunction, due to unspecified organism ([facility] [A41.9]   IMMUNOCOMPROMISED PT  AFEBRILE      H/O CDIFF   H/o Pseudomonas bacteriuria   CONT MEROPENEM PLAN 8 DAYS    Check cultures PENDING      urine cx mixed rubio    Watch for reoccurrence of cdiff    Imaging and labs were reviewed per medical records  An opportunity to ask questions was given to the patient/FAMILY and questions were answered. Thank you for involving me in the care of Rita Wilde. Please do not hesitate to call for any questions or concerns.          Electronically signed by Radha Franks MD on 4/24/2022 at 12:45 PM

## 2022-04-24 NOTE — PROGRESS NOTES
Left message for Dr. Jose F Syed answering service regarding the distal lumen of the pts TL line being nicked during a dressing change. This RN noted the pts gown was saturated at the neck line by the site of the TL line and changed the dressing to rule out any leakage. There were several pieces of tape on top of the transparent dressing and when trying to remove the tape the line was nicked in the process. The line was then clamped above the area that was cut and the pt is not complaining of any pain or discomfort. 3502: Left message for Dr. Jose F Syed answering service regarding the pt's distal lumen that was nicked last night during a dressing change. Awaiting a call back. Pt resting comfortably, no signs of distress noted.

## 2022-04-24 NOTE — PROGRESS NOTES
109.5* 110.4*   RDW 19.6* 19.0* 18.6*    141 158     CHEMISTRIES:  Recent Labs     04/22/22  0605 04/23/22  0620 04/24/22  0525    139 141   K 4.0 3.6 3.7    105 106   CO2 25 25 26   BUN 32* 25* 20   CREATININE 1.7* 1.4* 1.2*   GLUCOSE 119* 74 78     PT/INR:No results for input(s): PROTIME, INR in the last 72 hours. APTT:No results for input(s): APTT in the last 72 hours. LIVER PROFILE:  Recent Labs     04/22/22  0605 04/23/22  0620 04/24/22  0525   AST 8 8 8   ALT 5 6 6   BILITOT 0.6 0.4 0.3   ALKPHOS 50 47 45       Imaging Last 24 Hours:  XR CHEST PORTABLE    Result Date: 4/21/2022  EXAMINATION: ONE XRAY VIEW OF THE CHEST 4/21/2022 5:29 pm COMPARISON: 03/16/2022 HISTORY: ORDERING SYSTEM PROVIDED HISTORY: r/out PNA TECHNOLOGIST PROVIDED HISTORY: Reason for exam:->r/out PNA FINDINGS: The lungs are without acute focal process. There is no effusion or pneumothorax. Stable heart size. The osseous structures are without acute process. Right internal jugular line tip in the distal SVC. No evidence for pneumonia. Assessment//Plan           Hospital Problems           Last Modified POA    * (Principal) Sepsis ([facility] [address] [address] 4/21/2022 Yes        Assessment:    Condition: In stable condition. Improving. (UTI / sepsis  Await culture report on meropenum per ID . Hypotension better , will d/c fluids   Chronic renal failure creatinine to baseline . S/p renal transplant 2015 , immunocompromised   dvt in the left LL , on eliquis , and had ivc filter for possible GI bleed then . H/o  c diff colitis last admission still on tapering dose of  vanc . Had some loose stools yesterday , not watery enough to be tested   hyperlipid continue lipitor   Hypothyroidism continue levothyroxine. bipolar disorder on clozaril and prestiq stable    Chronic diastolic dysfunction CHF compensated. Chronic anemia    Hb stable and actually slightly better 8.1 monitor     ).        Electronically signed by Cubicle Sherri MD James on 4/23/22 at 2:07 PM EDT

## 2022-04-25 LAB
ALBUMIN SERPL-MCNC: 2.9 G/DL (ref 3.5–5.2)
ALP BLD-CCNC: 45 U/L (ref 35–104)
ALT SERPL-CCNC: 6 U/L (ref 0–32)
ANION GAP SERPL CALCULATED.3IONS-SCNC: 7 MMOL/L (ref 7–16)
AST SERPL-CCNC: 9 U/L (ref 0–31)
BASOPHILS ABSOLUTE: 0 E9/L (ref 0–0.2)
BASOPHILS RELATIVE PERCENT: 0.2 % (ref 0–2)
BILIRUB SERPL-MCNC: 0.3 MG/DL (ref 0–1.2)
BUN BLDV-MCNC: 13 MG/DL (ref 6–23)
CALCIUM SERPL-MCNC: 9.2 MG/DL (ref 8.6–10.2)
CHLORIDE BLD-SCNC: 107 MMOL/L (ref 98–107)
CO2: 27 MMOL/L (ref 22–29)
CREAT SERPL-MCNC: 1.1 MG/DL (ref 0.5–1)
EOSINOPHILS ABSOLUTE: 0.05 E9/L (ref 0.05–0.5)
EOSINOPHILS RELATIVE PERCENT: 0.9 % (ref 0–6)
GFR AFRICAN AMERICAN: >60
GFR NON-AFRICAN AMERICAN: 50 ML/MIN/1.73
GLUCOSE BLD-MCNC: 85 MG/DL (ref 74–99)
HCT VFR BLD CALC: 26.3 % (ref 34–48)
HEMOGLOBIN: 7.9 G/DL (ref 11.5–15.5)
LYMPHOCYTES ABSOLUTE: 0.47 E9/L (ref 1.5–4)
LYMPHOCYTES RELATIVE PERCENT: 8.8 % (ref 20–42)
MAGNESIUM: 1.3 MG/DL (ref 1.6–2.6)
MCH RBC QN AUTO: 33.2 PG (ref 26–35)
MCHC RBC AUTO-ENTMCNC: 30 % (ref 32–34.5)
MCV RBC AUTO: 110.5 FL (ref 80–99.9)
MONOCYTES ABSOLUTE: 0.36 E9/L (ref 0.1–0.95)
MONOCYTES RELATIVE PERCENT: 7 % (ref 2–12)
MYELOCYTE PERCENT: 0.9 % (ref 0–0)
NEUTROPHILS ABSOLUTE: 4.32 E9/L (ref 1.8–7.3)
NEUTROPHILS RELATIVE PERCENT: 82.5 % (ref 43–80)
PDW BLD-RTO: 18.6 FL (ref 11.5–15)
PLATELET # BLD: 163 E9/L (ref 130–450)
PMV BLD AUTO: 11.7 FL (ref 7–12)
POTASSIUM SERPL-SCNC: 3.7 MMOL/L (ref 3.5–5)
RBC # BLD: 2.38 E12/L (ref 3.5–5.5)
SODIUM BLD-SCNC: 141 MMOL/L (ref 132–146)
TOTAL PROTEIN: 4.7 G/DL (ref 6.4–8.3)
WBC # BLD: 5.2 E9/L (ref 4.5–11.5)

## 2022-04-25 PROCEDURE — 6370000000 HC RX 637 (ALT 250 FOR IP): Performed by: SPECIALIST

## 2022-04-25 PROCEDURE — 2580000003 HC RX 258: Performed by: INTERNAL MEDICINE

## 2022-04-25 PROCEDURE — 6370000000 HC RX 637 (ALT 250 FOR IP): Performed by: INTERNAL MEDICINE

## 2022-04-25 PROCEDURE — 6360000002 HC RX W HCPCS: Performed by: INTERNAL MEDICINE

## 2022-04-25 PROCEDURE — 80053 COMPREHEN METABOLIC PANEL: CPT

## 2022-04-25 PROCEDURE — 6360000002 HC RX W HCPCS: Performed by: SPECIALIST

## 2022-04-25 PROCEDURE — 36592 COLLECT BLOOD FROM PICC: CPT

## 2022-04-25 PROCEDURE — 2580000003 HC RX 258: Performed by: SPECIALIST

## 2022-04-25 PROCEDURE — 85025 COMPLETE CBC W/AUTO DIFF WBC: CPT

## 2022-04-25 PROCEDURE — 36415 COLL VENOUS BLD VENIPUNCTURE: CPT

## 2022-04-25 PROCEDURE — 2060000000 HC ICU INTERMEDIATE R&B

## 2022-04-25 PROCEDURE — 83735 ASSAY OF MAGNESIUM: CPT

## 2022-04-25 PROCEDURE — 99222 1ST HOSP IP/OBS MODERATE 55: CPT | Performed by: INTERNAL MEDICINE

## 2022-04-25 RX ORDER — POTASSIUM CHLORIDE 20 MEQ/1
40 TABLET, EXTENDED RELEASE ORAL ONCE
Status: COMPLETED | OUTPATIENT
Start: 2022-04-25 | End: 2022-04-25

## 2022-04-25 RX ORDER — MAGNESIUM SULFATE IN WATER 40 MG/ML
4000 INJECTION, SOLUTION INTRAVENOUS ONCE
Status: COMPLETED | OUTPATIENT
Start: 2022-04-25 | End: 2022-04-25

## 2022-04-25 RX ORDER — POTASSIUM CHLORIDE 20 MEQ/1
20 TABLET, EXTENDED RELEASE ORAL DAILY
Status: DISCONTINUED | OUTPATIENT
Start: 2022-04-26 | End: 2022-04-26 | Stop reason: HOSPADM

## 2022-04-25 RX ORDER — GRANULES FOR ORAL 3 G/1
3 POWDER ORAL
Status: DISCONTINUED | OUTPATIENT
Start: 2022-04-25 | End: 2022-04-26 | Stop reason: HOSPADM

## 2022-04-25 RX ORDER — LACTOBACILLUS RHAMNOSUS GG 10B CELL
1 CAPSULE ORAL EVERY 12 HOURS
Qty: 60 CAPSULE | Refills: 0 | Status: SHIPPED | OUTPATIENT
Start: 2022-04-25 | End: 2022-05-17 | Stop reason: ALTCHOICE

## 2022-04-25 RX ORDER — GRANULES FOR ORAL 3 G/1
3 POWDER ORAL
Qty: 1 EACH | Refills: 0 | DISCHARGE
Start: 2022-04-25 | End: 2022-05-09

## 2022-04-25 RX ADMIN — ALLOPURINOL 100 MG: 100 TABLET ORAL at 08:31

## 2022-04-25 RX ADMIN — POTASSIUM CHLORIDE 40 MEQ: 20 TABLET, EXTENDED RELEASE ORAL at 15:59

## 2022-04-25 RX ADMIN — ACYCLOVIR 400 MG: 400 TABLET ORAL at 21:27

## 2022-04-25 RX ADMIN — Medication 1 CAPSULE: at 02:54

## 2022-04-25 RX ADMIN — SODIUM BICARBONATE 650 MG TABLET 650 MG: at 08:33

## 2022-04-25 RX ADMIN — APIXABAN 5 MG: 5 TABLET, FILM COATED ORAL at 21:26

## 2022-04-25 RX ADMIN — Medication 1 CAPSULE: at 13:59

## 2022-04-25 RX ADMIN — APIXABAN 5 MG: 5 TABLET, FILM COATED ORAL at 08:31

## 2022-04-25 RX ADMIN — CLOZAPINE 50 MG: 25 TABLET ORAL at 21:27

## 2022-04-25 RX ADMIN — ACYCLOVIR 400 MG: 400 TABLET ORAL at 08:33

## 2022-04-25 RX ADMIN — LEVOTHYROXINE SODIUM 50 MCG: 0.05 TABLET ORAL at 06:11

## 2022-04-25 RX ADMIN — PANTOPRAZOLE SODIUM 40 MG: 40 TABLET, DELAYED RELEASE ORAL at 06:11

## 2022-04-25 RX ADMIN — PREDNISONE 5 MG: 5 TABLET ORAL at 08:31

## 2022-04-25 RX ADMIN — MEROPENEM 1000 MG: 1 INJECTION INTRAVENOUS at 02:53

## 2022-04-25 RX ADMIN — DESVENLAFAXINE 50 MG: 50 TABLET, EXTENDED RELEASE ORAL at 17:39

## 2022-04-25 RX ADMIN — Medication 125 MG: at 08:30

## 2022-04-25 RX ADMIN — METOPROLOL TARTRATE 12.5 MG: 25 TABLET, FILM COATED ORAL at 08:31

## 2022-04-25 RX ADMIN — Medication 1000 UNITS: at 08:31

## 2022-04-25 RX ADMIN — MAGNESIUM SULFATE IN WATER 4000 MG: 40 INJECTION, SOLUTION INTRAVENOUS at 16:05

## 2022-04-25 RX ADMIN — ANTI-FUNGAL POWDER MICONAZOLE NITRATE TALC FREE: 1.42 POWDER TOPICAL at 21:34

## 2022-04-25 RX ADMIN — FOSFOMYCIN TROMETHAMINE 1 PACKET: 3 GRANULE, FOR SOLUTION ORAL at 11:26

## 2022-04-25 RX ADMIN — ANTI-FUNGAL POWDER MICONAZOLE NITRATE TALC FREE: 1.42 POWDER TOPICAL at 09:57

## 2022-04-25 RX ADMIN — FOLIC ACID 1 MG: 1 TABLET ORAL at 08:31

## 2022-04-25 RX ADMIN — MYCOPHENOLATE MOFETIL 1000 MG: 250 CAPSULE ORAL at 09:00

## 2022-04-25 RX ADMIN — CYCLOSPORINE 100 MG: 25 CAPSULE, GELATIN COATED ORAL at 08:32

## 2022-04-25 RX ADMIN — MYCOPHENOLATE MOFETIL 1000 MG: 250 CAPSULE ORAL at 21:27

## 2022-04-25 RX ADMIN — ATORVASTATIN CALCIUM 10 MG: 10 TABLET, FILM COATED ORAL at 21:27

## 2022-04-25 RX ADMIN — CYCLOSPORINE 100 MG: 25 CAPSULE, GELATIN COATED ORAL at 21:26

## 2022-04-25 RX ADMIN — METOPROLOL TARTRATE 12.5 MG: 25 TABLET, FILM COATED ORAL at 21:28

## 2022-04-25 ASSESSMENT — ENCOUNTER SYMPTOMS: VOMITING: 1

## 2022-04-25 ASSESSMENT — PAIN SCALES - WONG BAKER
WONGBAKER_NUMERICALRESPONSE: 0
WONGBAKER_NUMERICALRESPONSE: 0

## 2022-04-25 ASSESSMENT — PAIN SCALES - GENERAL
PAINLEVEL_OUTOF10: 0

## 2022-04-25 ASSESSMENT — PAIN DESCRIPTION - PROGRESSION
CLINICAL_PROGRESSION: NOT CHANGED
CLINICAL_PROGRESSION: NOT CHANGED

## 2022-04-25 NOTE — CARE COORDINATION
Copy of IMM Letter was emailed to duong Mcneal at [email or web address].  Electronically signed by Carlos Alberto Loya on 4/25/2022 at 12:17 PM

## 2022-04-25 NOTE — PLAN OF CARE
Problem: Discharge Planning  Goal: Discharge to home or other facility with appropriate resources  4/25/2022 1445 by Meredith Silva RN  Outcome: Progressing  4/25/2022 0228 by Jung Sherwood RN  Outcome: Progressing     Problem: Pain  Goal: Verbalizes/displays adequate comfort level or baseline comfort level  4/25/2022 1445 by Meredith Silva RN  Outcome: Progressing  4/25/2022 0228 by Jung Sherwood RN  Outcome: Progressing     Problem: Chronic Conditions and Co-morbidities  Goal: Patient's chronic conditions and co-morbidity symptoms are monitored and maintained or improved  4/25/2022 1445 by Meredith Silva RN  Outcome: Progressing  4/25/2022 0228 by Jung Sherwood RN  Outcome: Progressing     Problem: Skin/Tissue Integrity  Goal: Absence of new skin breakdown  Description: 1. Monitor for areas of redness and/or skin breakdown  2. Assess vascular access sites hourly  3. Every 4-6 hours minimum:  Change oxygen saturation probe site  4. Every 4-6 hours:  If on nasal continuous positive airway pressure, respiratory therapy assess nares and determine need for appliance change or resting period.   4/25/2022 1445 by Meredith Silva RN  Outcome: Progressing  4/25/2022 0228 by Jung Sherwood RN  Outcome: Progressing     Problem: Safety - Adult  Goal: Free from fall injury  4/25/2022 1445 by Meredith Silva RN  Outcome: Progressing  4/25/2022 0228 by Jung Sherwood RN  Outcome: Progressing     Problem: ABCDS Injury Assessment  Goal: Absence of physical injury  4/25/2022 1445 by Meredith Silva RN  Outcome: Progressing  4/25/2022 0228 by Jung Sherwood RN  Outcome: Progressing

## 2022-04-25 NOTE — CARE COORDINATION
SOCIAL WORK / DISCHARGE PLANNING:  COVID neg 4/21, WILL NEED RAPID COVID DAY OF DC. Dc plan is to return to [facility] [place], [address] [facility], fax [phone], skilled. NO PRECERT needed. Shannon Sanderson, cardiology was consulted. May need picc line replaced prior to dc. IV Merrem x 8 days per ID. GINA will need signed by physician.                       Electronically signed by Claude Maser, LSW on 4/25/2022 at 10:12 AM

## 2022-04-25 NOTE — PROGRESS NOTES
[facility]     [address] [place] [address]  [place], [address]  Phone [phone]   [phone]      Admit Date: 2022  4:45 PM  Pt Name: Lorenzo Palmer  MRN: [id number]  : 1955  Reason for Consult:    Chief Complaint   Patient presents with    Emesis     pt sent here from nursing home, vomitting onset today     Requesting Physician:  John Barber MD  PCP: John aBrber MD  History Obtained From:  patient, chart   ID consulted for Sepsis [facility]) [A41.9]  Kidney transplant recipient [Z94.0]  Chronic renal impairment, unspecified CKD stage [N18.9]  Sepsis without acute organ dysfunction, due to unspecified organism ([facility] [address] [A41.9]  on hospital day [facility]       Chief Complaint   Patient presents with    Emesis     pt sent here from nursing home, vomitting onset today     HISTORYOF PRESENT ILLNESS   Lorenzo Palmer is a 78 y.o. female who presents with   has a past medical history of Abnormal EKG, Acute gout involving toe of right foot, Acute on chronic combined systolic (congestive) and diastolic (congestive) heart failure ([address] Cancer ([address] Cerebrovascular disease, Clotting disorder ([address] Depression, Hemodialysis patient ([address] History of blood transfusion, Hyperlipidemia, Hypertension, Hypothyroidism, Leg swelling, Lymphedema of both lower extremities, Peripheral vascular disease ([address] Renal failure, Thrombophlebitis, and Thyroid disease.       Emesis       PT CAME IN WITH uti SYMPTOMS SHE DENIES DIARRHEA  HAD N/V  HAS WORKED WITH PT AT [facility]   SHE HAD HAJC576.5 TC 98  ON 1l  WBC8.2 CR1.7  UA BLOOD NITRITE LE LE BACTERIA   HAS H/O CDIFF 3/10/2022  H/O PSEUDOMONAS BACTERURIA 3/9/2022  CURRENTLY ON      cefTRIAXone (ROCEPHIN) 1,000 mg in sterile water 10 mL IV syringe, Q24H     Dos  22   PT IN BED  AWAKE AND ALERT ASKING  ABOUT ATBX AND D/C  HAS SOFT STOOLS     REVIEW OF SYSTEMS     CONSTITUTIONAL:   No fever, chills, weight loss  ALLERGIES:    No urticaria, hay fever,    EYES:     No blurry vision, loss of vision, eye pain  ENT:      No hearing loss, sore throat  CARDIOVASCULAR:  No chest pain or palpitations  RESPIRATORY:   No cough, sob  ENDOCRINE:    No increase thirst, urination   HEME-LYMPH:   No easy bruising or bleeding  GI:     No nausea, vomiting or diarrhea  :     no  urinary complaints  NEURO:    No seizures, stroke, HA  MUSCULOSKELETAL:  No muscle aches or pain, no joint pain  SKIN:     No rash or itch  PSYCH:    No depression or anxiety    Medications Prior to Admission: diphenhydrAMINE (BENADRYL) 25 MG tablet, Take 25 mg by mouth every 6 hours as needed for Itching or Allergies  cloZAPine (CLOZARIL) 50 MG tablet, Take 50 mg by mouth nightly  hydroCHLOROthiazide (HYDRODIURIL) 25 MG tablet, Take 25 mg by mouth daily  nystatin (MYCOSTATIN) 897673 UNIT/GM powder, Apply 1 each topically 3 times daily Apply to Groin  ondansetron (ZOFRAN) 4 MG tablet, Take 4 mg by mouth every 8 hours as needed for Nausea or Vomiting  epoetin casey-epbx (RETACRIT) 2000 UNIT/ML injection, Inject 2,000 Units into the skin See Admin Instructions Every 6 days  acetaminophen (TYLENOL) 325 MG tablet, Take 650 mg by mouth every 4 hours as needed for Pain or Fever   [] vancomycin (VANCOCIN) 125 MG capsule, Take 125 mg by mouth in the morning and at bedtime Twice daily for 10 days.   1st dose administered evening of 2022  vitamin D (CHOLECALCIFEROL) 25 MCG (1000 UT) TABS tablet, Take 1,000 Units by mouth daily  acyclovir (ZOVIRAX) 400 MG tablet, Take 400 mg by mouth 2 times daily  midodrine (PROAMATINE) 5 MG tablet, Take 1 tablet by mouth 2 times daily as needed (For SBP < 95)  simethicone (MYLICON) 80 MG chewable tablet, Take 2 tablets by mouth 3 times daily as needed (cramping)  sodium bicarbonate 650 MG tablet, Take 1 tablet by mouth daily  magnesium oxide (MAG-OX) 400 MG tablet, Take 1 tablet by mouth daily  metoprolol tartrate (LOPRESSOR) 25 MG tablet, Take 0.5 tablets by mouth 2 times daily  pantoprazole (PROTONIX) 40 MG tablet, Take 1 tablet by mouth every morning (before breakfast)  apixaban (ELIQUIS) 5 MG TABS tablet, Take 5 mg by mouth 2 times daily   atorvastatin (LIPITOR) 10 MG tablet, Take 10 mg by mouth at bedtime   ipratropium-albuterol (DUONEB) 0.5-2.5 (3) MG/3ML SOLN nebulizer solution, Inhale 3 mLs into the lungs every 6 hours as needed for Shortness of Breath  allopurinol (ZYLOPRIM) 100 MG tablet, Take 100 mg by mouth daily  Cyanocobalamin (B-12 COMPLIANCE INJECTION) 1000 MCG/ML KIT, Inject 1,000 mcg as directed every 28 days   desvenlafaxine succinate (PRISTIQ) 50 MG TB24 extended release tablet, Take 1 tablet by mouth every evening  cycloSPORINE (SANDIMMUNE) 100 MG capsule, Take 100 mg by mouth 2 times daily  predniSONE (DELTASONE) 5 MG tablet, Take 5 mg by mouth every morning   folic acid (FOLVITE) 1 MG tablet, Take 1 mg by mouth daily  docusate sodium (COLACE) 100 MG capsule, Take 100 mg by mouth 2 times daily  mycophenolate (CELLCEPT) 500 MG tablet, Take 1,000 mg by mouth 2 times daily  levothyroxine (SYNTHROID) 50 MCG tablet, Take 50 mcg by mouth daily.     CURRENT MEDICATIONS     Current Facility-Administered Medications:     mycophenolate (CELLCEPT) capsule 1,000 mg, 1,000 mg, Oral, BID, Susannah Ramirez MD, 1,000 mg at 04/25/22 0900    cycloSPORINE (SANDIMMUNE) capsule 100 mg, 100 mg, Oral, BID, Susannah Ramirez MD, 100 mg at 04/25/22 0832    meropenem (MERREM) 1,000 mg in sodium chloride 0.9 % 100 mL IVPB (mini-bag), 1,000 mg, IntraVENous, Q12H, Aleisa Rangel MD, Stopped at 04/25/22 0330    lactobacillus (CULTURELLE) capsule 1 capsule, 1 capsule, Oral, Q12H, Alesia Rangel MD, 1 capsule at 04/25/22 0254    acetaminophen (TYLENOL) tablet 650 mg, 650 mg, Oral, Q4H PRN, Fidel Staley MD, 650 mg at 04/22/22 0135    acyclovir (ZOVIRAX) tablet 400 mg, 400 mg, Oral, BID, Fidel Staley MD, 400 mg at 04/25/22 Units, 1,000 Units, Oral, Daily, Morgan Childers MD, 1,000 Units at 04/25/22 0831  ALLERGIES     Macrodantin [nitrofurantoin macrocrystal] and Sulfa antibiotics  Immunization History   Administered Date(s) Administered    COVID-19, Pfizer Purple top, DILUTE for use, 12+ yrs, 30mcg/0.3mL dose 01/28/2021, 02/18/2021, 08/18/2021      Internal Administration   First Dose COVID-19, Pfizer Purple top, DILUTE for use, 12+ yrs, 30mcg/0.3mL dose  01/28/2021   Second Dose COVID-19, Pfizer Purple top, DILUTE for use, 12+ yrs, 30mcg/0.3mL dose   02/18/2021       Last COVID Lab SARS-CoV-2 (no units)   Date Value   01/20/2021 Not Detected     SARS-CoV-2 Antibody, Total (no units)   Date Value   01/13/2022 Non-Reactive     SARS-CoV-2, PCR (no units)   Date Value   02/25/2022 Not Detected     SARS-CoV-2, NAAT (no units)   Date Value   04/21/2022 Not Detected            PAST MEDICAL HISTORY     Past Medical History:   Diagnosis Date    Abnormal EKG     left bundle branch block    Acute gout involving toe of right foot 09/03/2020    Acute on chronic combined systolic (congestive) and diastolic (congestive) heart failure (HCC)     Cancer (HCC)     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    Cerebrovascular disease     Clotting disorder ([address] 1985    thrombophlebitis after c section delivery    Depression     15 years followed by dr Viral Bowling Hemodialysis patient [facility])     History of blood transfusion     Hyperlipidemia     Hypertension     Hypothyroidism     Leg swelling 09/03/2020    Lymphedema of both lower extremities 09/03/2020    Peripheral vascular disease ([address]     Renal failure 11/2012    renal transplant    Thrombophlebitis     after c section delivery    Thyroid disease      SURGICAL HISTORY       Past Surgical History:   Procedure Laterality Date   [place]    one normal delivery    COLECTOMY N/A 1/15/2022    DIAGNOSTIC  LAPAROSCOPY LYSIS OF ADHESIONS performed by Karen Shi Eloina Tabor MD at [address] CATH LAB PROCEDURE  2006    normal coronaries and 1996 normal coronaries    DIALYSIS FISTULA CREATION  march and july 2012    phase one and two patient will start dialysis when needed   [address]    transfemoral venous bypass grafts    IR IVC FILTER PLACEMENT W IMAGING  2/26/2022    IR IVC FILTER PLACEMENT W IMAGING 2/26/2022 [address] SPECIAL PROCEDURES    KIDNEY TRANSPLANT  2016    KIDNEY TRANSPLANT  2015    KIDNEY TRANSPLANT  2015    PARATHYROIDECTOMY  2006    left parathyroid  implant and parathyroidectomy    TRANSESOPHAGEAL ECHOCARDIOGRAM N/A 2/28/2022    TRANSESOPHAGEAL ECHOCARDIOGRAM WITH BUBBLE STUDY performed by Augustina Shahid MD at [address]       Family History   Problem Relation Age of Onset    Diabetes Mother     Diabetes Father     Dementia Father      ·      PHYSICAL EXAM          Vitals:    04/24/22 0730 04/24/22 1400 04/24/22 2015 04/25/22 0300   BP: 134/61 (!) 127/59 130/67 (!) 138/94   Pulse: 84 85 82 84   Resp: 20 20 18 16   Temp: 98.4 °F (36.9 °C) 97.4 °F (36.3 °C) 97.5 °F (36.4 °C) 97.5 °F (36.4 °C)   TempSrc: Infrared Oral Infrared Infrared   SpO2: 91% 92% 95% 95%   Weight:       Height:         Physical Exam   Constitutional/General: Alert and oriented NAD   Head: NC/AT   Pulmonary: Lungs dec  to auscultation bilaterally ant   Cardiovascular:  Regular rate and rhythm,  Abdomen: Soft, + BS.    distension. Nontender. Extremities:    EDEMA   Skin: Warm and dry NO RASH   Neurologic:    No focal deficits follow commands  Psych: Normal Affect  tlc     DIAGNOSTIC RESULTS   RADIOLOGY:   XR CHEST PORTABLE    Result Date: 4/21/2022  EXAMINATION: ONE XRAY VIEW OF THE CHEST 4/21/2022 5:29 pm COMPARISON: 03/16/2022 HISTORY: ORDERING SYSTEM PROVIDED HISTORY: r/out PNA TECHNOLOGIST PROVIDED HISTORY: Reason for exam:->r/out PNA FINDINGS: The lungs are without acute focal process.   There is no effusion or pneumothorax. Stable heart size. The osseous structures are without acute process. Right internal jugular line tip in the distal SVC. No evidence for pneumonia. LABS  Recent Labs     04/23/22  0620 04/24/22  0525 04/25/22  0610   WBC 5.9 5.4 5.2   HGB 7.9* 8.1* 7.9*   HCT 26.4* 26.6* 26.3*   .5* 110.4* 110.5*    158 163     Recent Labs     04/23/22  0620 04/23/22  0620 04/24/22  0525 04/24/22  0525 04/25/22  0610     --  141  --  141   K 3.6   < > 3.7   < > 3.7      < > 106   < > 107   CO2 25   < > 26   < > 27   BUN 25*  --  20  --  13   CREATININE 1.4*  --  1.2*  --  1.1*   GFRAA 46  --  54  --  >60   LABGLOM 38  --  45  --  50   GLUCOSE 74  --  78  --  85   PROT 4.8*  --  4.8*  --  4.7*   LABALBU 2.8*  --  2.7*  --  2.9*   CALCIUM 9.5  --  9.4  --  9.2   BILITOT 0.4  --  0.3  --  0.3   ALKPHOS 47  --  45  --  45   AST 8  --  8  --  9   ALT 6  --  6  --  6    < > = values in this interval not displayed. No results for input(s): PROCAL in the last 72 hours.   Lab Results   Component Value Date    CRP 18.3 (H) 01/23/2022    CRP 34.6 (H) 11/17/2021     Lab Results   Component Value Date    SEDRATE 61 (H) 01/23/2022    SEDRATE 62 (H) 11/20/2021     No results found for: [id number]  Lab Results   Component Value Date    ADVIRPCR Not Detected 02/25/2022    BPARAPPCR Not Detected 02/25/2022    BPPTXPPCR Not Detected 02/25/2022    CHLPNEPCR Not Detected 02/25/2022    [id number] Not Detected 02/25/2022    ORCBHU1NFQ Not Detected 02/25/2022    CQWRK45VJC Not Detected 02/25/2022    NXRRG34IZQ Not Detected 02/25/2022    COVID19 Not Detected 04/21/2022    COVID19 Not Detected 02/25/2022    [facility] SEWANEE Not Detected 02/25/2022    RHENTPCR Not Detected 02/25/2022    FLUBPCR Not Detected 04/21/2022    [facility] SYSTEM Not Detected 02/25/2022    TEZYSSF0DHV Not Detected 02/25/2022    PSDFQHA1GGH Not Detected 02/25/2022    USMKZKZ2ZMY Not Detected 02/25/2022    QHHGYEK7YWD Not Detected 02/25/2022    RSVPCR Not Detected 02/25/2022          Lab Results   Component Value Date    COVID19 Not Detected 04/21/2022    COVID19 Not Detected 02/25/2022     COVID-19/MARINA-COV2 LABS  Recent Labs     04/23/22  0620 04/23/22  1000 04/24/22  0525 04/25/22  0610   FERRITIN  --  788  --   --    AST 8  --  8 9   ALT 6  --  6 6        MICROBIOLOGY:          Lab Results   Component Value Date    BC 24 Hours no growth 04/21/2022    BC 5 Days no growth 03/09/2022    BC 5 Days no growth 02/27/2022    ORG Pseudomonas aeruginosa 03/09/2022    ORG Staphylococcus coagulase-negative 02/25/2022    ORG Enterococcus faecalis 01/12/2022          WOUND/ABSCESS   Date Value Ref Range Status   01/24/2022 Growth not present  Final        Urine Culture, Routine   Date Value Ref Range Status   04/21/2022   Final    10 to 100,000 CFU/mL  Mixed rubio isolated. Further workup and sensitivity testing  is not routinely indicated and will not be performed.   Mixed rubio isolated includes:  Mixed gram negative rods  Gram positive organism     03/09/2022 75,000 CFU/ml  Final   02/26/2022 Growth not present  Final     MRSA Culture Only   Date Value Ref Range Status   11/17/2021 Methicillin resistant Staph aureus not isolated  Final          FINAL IMPRESSION    Patient is a 78 y.o. female who presented with   Chief Complaint   Patient presents with    Emesis     pt sent here from nursing home, vomitting onset today    and admitted for Sepsis ([facility] [address] [A41.9]  Kidney transplant recipient [Z94.0]  Chronic renal impairment, unspecified CKD stage [N18.9]  Sepsis without acute organ dysfunction, due to unspecified organism ([facility] [address] [A41.9]   IMMUNOCOMPROMISED PT  AFEBRILE      H/O CDIFF   H/o Pseudomonas bacteriuria   SWITCH  MEROPENEM TO FOSFOMYCIN    Check cultures  MIXED RUBIO      urine cx mixed rubio    Watch for reoccurrence of cdiff ON CULTURELLE   PT  MED REC  F/U 2-3 WEEKS  REMOVE TLC ON D/C  Imaging and labs were reviewed per medical records  An opportunity to ask questions was given to the patient/FAMILY and questions were answered. Thank you for involving me in the care of Edilson Batista. Please do not hesitate to call for any questions or concerns.          Electronically signed by Royal Patterson MD on 4/25/2022 at 10:03 AM

## 2022-04-25 NOTE — PROGRESS NOTES
Dr. Janis Tyson called to notify her of the pt having 16 beats of Vtach this morning. She recommends consulting cardiology.

## 2022-04-25 NOTE — CONSULTS
INPATIENT CARDIOLOGY CONSULT    Name: Jeanette Garcia    Age: 78 y.o. Date of Admission: 4/21/2022  4:45 PM    Date of Service: 4/25/2022    Reason for Consultation: NSVT    Referring Physician: Vazquez Encarnacion MD    Primary Cardiologist: Dr Jonas Lefort    History of Present Illness:   Jeanette Garcia is a 78 y.o. female who presented on 4/21/2022 for further evaluation of vomiting and dysuria. She is being treated for sepsis and UTI. She had 17 beat nonsustained VT this morning. Her magnesium was 1.3 and potassium was 3.7. She was asymptomatic. She denies chest pain, shortness of breath, palpitations. She states today she started having diarrhea again. She had recent C. difficile. She is nonambulatory. She has had multiple extensive hospitalizations recently. She has history of renal transplant. Review of Systems:   Complete review of systems negative except as described above.     Past Medical History:  Past Medical History:   Diagnosis Date    Abnormal EKG     left bundle branch block    Acute gout involving toe of right foot 09/03/2020    Acute on chronic combined systolic (congestive) and diastolic (congestive) heart failure (HCC)     Cancer (HCC)     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    Cerebrovascular disease     Clotting disorder ([address] 1985    thrombophlebitis after c section delivery    Depression     15 years followed by dr Jefrey Bence Hemod[facility] patient [facility])     History of blood transfusion     Hyperlipidemia     Hypertension     Hypothyroidism     Leg swelling 09/03/2020    Lymphedema of both lower extremities 09/03/2020    Peripheral vascular disease ([address]     Renal failure 11/2012    renal transplant    Thrombophlebitis     after c section delivery    Thyroid disease        Past Surgical History:  Past Surgical History:   Procedure Laterality Date   [address]    one normal delivery    COLECTOMY N/A 1/15/2022 DIAGNOSTIC  LAPAROSCOPY LYSIS OF ADHESIONS performed by Ulysses Silva, MD at [address] [place] [address] Drive CATH LAB PROCEDURE      normal coronaries and  normal coronaries    DIALYSIS FISTULA CREATION  march and 2012    phase one and two patient will start dialysis when needed   [address]    transfemoral venous bypass grafts    IR IVC FILTER PLACEMENT W IMAGING  2022    IR IVC FILTER PLACEMENT W IMAGING 2022 [facility]ED[facility]    KIDNEY TRANSPLANT  2016    KIDNEY TRANSPLANT  2015    KIDNEY TRANSPLANT  2015    PARATHYROIDECTOMY  2006    left parathyroid  implant and parathyroidectomy    TRANSESOPHAGEAL ECHOCARDIOGRAM N/A 2022    TRANSESOPHAGEAL ECHOCARDIOGRAM WITH BUBBLE STUDY performed by Rolf Carty MD at [facility] ENDOSCOPY       Family History:  Family History   Problem Relation Age of Onset    Diabetes Mother     Diabetes Father     Dementia Father        Social History:  Social History     Tobacco Use    Smoking status: Former Smoker     Packs/day: 1.00     Years: 20.00     Pack years: 20.00     Quit date: 10/1/2015     Years since quittin.5    Smokeless tobacco: Never Used   Vaping Use    Vaping Use: Never used   Substance Use Topics    Alcohol use: No     Comment: 2 cups coffee per day    Drug use: No       Allergies: Allergies   Allergen Reactions    Macrodantin [Nitrofurantoin Macrocrystal]     Sulfa Antibiotics        Home Medications:  Prior to Admission medications    Medication Sig Start Date End Date Taking?  Authorizing Provider   diphenhydrAMINE (BENADRYL) 25 MG tablet Take 25 mg by mouth every 6 hours as needed for Itching or Allergies   Yes Historical Provider, MD   cloZAPine (CLOZARIL) 50 MG tablet Take 50 mg by mouth nightly   Yes Historical Provider, MD   hydroCHLOROthiazide (HYDRODIURIL) 25 MG tablet Take 25 mg by mouth daily   Yes Historical Provider, MD   nystatin (MYCOSTATIN) 739816 UNIT/GM powder Apply 1 each topically 3 times daily Apply to Groin   Yes Historical Provider, MD   ondansetron (ZOFRAN) 4 MG tablet Take 4 mg by mouth every 8 hours as needed for Nausea or Vomiting   Yes Historical Provider, MD   epoetin casey-epbx (RETACRIT) 2000 UNIT/ML injection Inject 2,000 Units into the skin See Admin Instructions Every 6 days   Yes Historical Provider, MD   acetaminophen (TYLENOL) 325 MG tablet Take 650 mg by mouth every 4 hours as needed for Pain or Fever    Yes Historical Provider, MD   vitamin D (CHOLECALCIFEROL) 25 MCG (1000 UT) TABS tablet Take 1,000 Units by mouth daily   Yes Historical Provider, MD   acyclovir (ZOVIRAX) 400 MG tablet Take 400 mg by mouth 2 times daily   Yes Historical Provider, MD   midodrine (PROAMATINE) 5 MG tablet Take 1 tablet by mouth 2 times daily as needed (For SBP < 95) 3/23/22   Susannah Ramirez MD   simethicone (MYLICON) 80 MG chewable tablet Take 2 tablets by mouth 3 times daily as needed (cramping) 3/23/22   Ernie Cruz MD   sodium bicarbonate 650 MG tablet Take 1 tablet by mouth daily 3/24/22   Ernie Cruz MD   magnesium oxide (MAG-OX) 400 MG tablet Take 1 tablet by mouth daily 3/21/22   Ernie Cruz MD   metoprolol tartrate (LOPRESSOR) 25 MG tablet Take 0.5 tablets by mouth 2 times daily 3/21/22   Ernie Cruz MD   pantoprazole (PROTONIX) 40 MG tablet Take 1 tablet by mouth every morning (before breakfast) 3/2/22   KATINA Davis - CNP   apixaban (ELIQUIS) 5 MG TABS tablet Take 5 mg by mouth 2 times daily     Historical Provider, MD   atorvastatin (LIPITOR) 10 MG tablet Take 10 mg by mouth at bedtime     Historical Provider, MD   ipratropium-albuterol (DUONEB) 0.5-2.5 (3) MG/3ML SOLN nebulizer solution Inhale 3 mLs into the lungs every 6 hours as needed for Shortness of Breath 11/27/21   James aRmirez MD   allopurinol (ZYLOPRIM) 100 MG tablet Take 100 mg by mouth daily    Historical Provider, MD   Cyanocobalamin (B-12 COMPLIANCE INJECTION) 1000 MCG/ML KIT Inject 1,000 mcg as directed every 28 days     Historical Provider, MD   desvenlafaxine succinate (PRISTIQ) 50 MG TB24 extended release tablet Take 1 tablet by mouth every evening 12/20/19   Tori Niño MD   cycloSPORINE (SANDIMMUNE) 100 MG capsule Take 100 mg by mouth 2 times daily    Historical Provider, MD   predniSONE (DELTASONE) 5 MG tablet Take 5 mg by mouth every morning     Historical Provider, MD   folic acid (FOLVITE) 1 MG tablet Take 1 mg by mouth daily    Historical Provider, MD   docusate sodium (COLACE) 100 MG capsule Take 100 mg by mouth 2 times daily    Historical Provider, MD   mycophenolate (CELLCEPT) 500 MG tablet Take 1,000 mg by mouth 2 times daily    Historical Provider, MD   levothyroxine (SYNTHROID) 50 MCG tablet Take 50 mcg by mouth daily.       Historical Provider, MD       Current Medications:    Current Facility-Administered Medications:     mycophenolate (CELLCEPT) capsule 1,000 mg, 1,000 mg, Oral, BID, Susannah Ramirez MD, 1,000 mg at 04/24/22 2131    cycloSPORINE (SANDIMMUNE) capsule 100 mg, 100 mg, Oral, BID, Susannah Ramirez MD, 100 mg at 04/24/22 2131    meropenem (MERREM) 1,000 mg in sodium chloride 0.9 % 100 mL IVPB (mini-bag), 1,000 mg, IntraVENous, Q12H, Carmelo Gonzalez MD, Stopped at 04/25/22 0330    lactobacillus (CULTURELLE) capsule 1 capsule, 1 capsule, Oral, Q12H, Carmelo Gonzalez MD, 1 capsule at 04/25/22 0254    acetaminophen (TYLENOL) tablet 650 mg, 650 mg, Oral, Q4H PRN, Tori Niño MD, 650 mg at 04/22/22 0135    acyclovir (ZOVIRAX) tablet 400 mg, 400 mg, Oral, BID, Johnathon Ramirez MD, 400 mg at 04/24/22 2131    allopurinol (ZYLOPRIM) tablet 100 mg, 100 mg, Oral, Daily, Tori Niño MD, 100 mg at 04/24/22 0827    apixaban (ELIQUIS) tablet 5 mg, 5 mg, Oral, BID, Tori Niño MD, 5 mg at 04/24/22 2132    atorvastatin (LIPITOR) tablet 10 mg, 10 mg, Oral, Nightly, Susannah Ramirez MD, 10 mg at 04/24/22 2132    cloZAPine (CLOZARIL) tablet 50 mg, 50 mg, Oral, Nightly, Nile Lara MD, 50 mg at 04/24/22 2132    desvenlafaxine succinate (PRISTIQ) extended release tablet 50 mg, 50 mg, Oral, QPM, Susannah Ramirez MD, 50 mg at 04/24/22 1641    docusate sodium (COLACE) capsule 100 mg, 100 mg, Oral, BID PRN, Nile Lara MD    folic acid (FOLVITE) tablet 1 mg, 1 mg, Oral, Daily, Susannah Ramirez MD, 1 mg at 04/24/22 0827    ipratropium-albuterol (DUONEB) nebulizer solution 3 mL, 3 mL, Inhalation, Q6H PRN, Nile Lara MD    levothyroxine (SYNTHROID) tablet 50 mcg, 50 mcg, Oral, Daily, Nile Lara MD, 50 mcg at 04/25/22 7219    metoprolol tartrate (LOPRESSOR) tablet 12.5 mg, 12.5 mg, Oral, BID, Jett Ramirez MD, 12.5 mg at 04/24/22 2132    midodrine (PROAMATINE) tablet 5 mg, 5 mg, Oral, BID PRN, Nile Lara MD, 5 mg at 04/24/22 0827    miconazole (MICOTIN) 2 % powder, , Topical, BID, Nile Lara MD, Given at 04/24/22 2134    ondansetron (ZOFRAN) tablet 4 mg, 4 mg, Oral, Q8H PRN, Nile Lara MD, 4 mg at 04/24/22 1641    pantoprazole (PROTONIX) tablet 40 mg, 40 mg, Oral, QAM AC, Nile Lara MD, 40 mg at 04/25/22 5663    predniSONE (DELTASONE) tablet 5 mg, 5 mg, Oral, QAM, Jett Ramirez MD, 5 mg at 04/24/22 0827    simethicone (MYLICON) chewable tablet 160 mg, 160 mg, Oral, TID PRN, Nile Lara MD, 160 mg at 04/24/22 1417    sodium bicarbonate tablet 650 mg, 650 mg, Oral, Daily, Nile Lara MD, 650 mg at 04/24/22 9305    vancomycin (VANCOCIN) oral solution 125 mg, 125 mg, Oral, BID, Nile Lara MD, 125 mg at 04/24/22 6378    vitamin D (CHOLECALCIFEROL) tablet 1,000 Units, 1,000 Units, Oral, Daily, Nile Lara MD, 1,000 Units at 04/24/22 0881    Physical Exam:  BP (!) 138/94   Pulse 84   Temp 97.5 °F (36.4 °C) (Infrared)   Resp 16   Ht 5' 1\" (1.549 m)   Wt 226 lb (102.5 kg)   SpO2 95%   BMI 42.70 kg/m²   Wt Readings from Last 3 Encounters:   04/21/22 226 lb (102.5 kg)   03/15/22 226 lb (102.5 kg)   02/25/22 220 lb (99.8 kg)     Appearance: Overweight female, awake, alert, no acute respiratory distress  Skin: Intact, no rash  Head: Normocephalic, atraumatic  Eyes: EOMI, no conjunctival erythema  ENMT: No pharyngeal erythema, MMM, no rhinorrhea  Neck: Supple, no elevated JVP, no carotid bruits  Lungs: Clear to auscultation bilaterally. No wheezes, rales, or rhonchi. Cardiac: PMI nondisplaced, regular rhythm with a normal rate, normal S1 & S2, no murmurs  Abdomen: Soft, nontender, +bowel sounds  Extremities: Moves all extremities x 4, trace to 1+ bilateral lower extremity edema with bilateral foot drop  Neurologic: No focal motor deficits apparent, normal mood and affect  Peripheral Pulses: Intact posterior tibial pulses bilaterally    Intake/Output:    Intake/Output Summary (Last 24 hours) at 4/25/2022 0749  Last data filed at 4/24/2022 1456  Gross per 24 hour   Intake 420 ml   Output 0 ml   Net 420 ml     No intake/output data recorded.     Laboratory Tests:  Recent Labs     04/23/22  0620 04/24/22  0525 04/25/22  0610    141 141   K 3.6 3.7 3.7    106 107   CO2 25 26 27   BUN 25* 20 13   CREATININE 1.4* 1.2* 1.1*   GLUCOSE 74 78 85   CALCIUM 9.5 9.4 9.2     Lab Results   Component Value Date    MG 1.7 03/16/2022     Recent Labs     04/23/22  0620 04/24/22  0525 04/25/22  0610   ALKPHOS 47 45 45   ALT 6 6 6   AST 8 8 9   PROT 4.8* 4.8* 4.7*   BILITOT 0.4 0.3 0.3   LABALBU 2.8* 2.7* 2.9*     Recent Labs     04/23/22  0620 04/24/22  0525 04/25/22  0610   WBC 5.9 5.4 5.2   RBC 2.41* 2.41* 2.38*   HGB 7.9* 8.1* 7.9*   HCT 26.4* 26.6* 26.3*   .5* 110.4* 110.5*   MCH 32.8 33.6 33.2   MCHC 29.9* 30.5* 30.0*   RDW 19.0* 18.6* 18.6*    158 163   MPV 11.9 12.0 11.7     Lab Results   Component Value Date    CKTOTAL 21 02/25/2022     Lab Results   Component Value Date    INR 1.1 03/01/2022    INR 1.1 01/23/2022    INR 1.0 11/20/2021    PROTIME 12.4 03/01/2022    PROTIME 13.0 (H) 01/23/2022    PROTIME 11.5 2021     Lab Results   Component Value Date    TSH 2.270 03/10/2022     Lab Results   Component Value Date    LABA1C 5.7 (H) 2021     No results found for: EAG  No results found for: CHOL  No results found for: TRIG  No results found for: HDL  No results found for: LDLCALC, LDLCHOLESTEROL  No results found for: LABVLDL, VLDL  No results found for: CHOLHDLRATIO  No results for input(s): PROBNP in the last 72 hours. Cardiac Tests:  EK2022: Sinus tachycardia 117 bpm.  Left bundle branch block with left axis. QRS duration 138 ms    Telemetry: Sinus rhythm 80s. 17 beat NSVT    Chest X-ray:         Impression   No evidence for pneumonia. Echocardiogram:   TTE 22 [place]   Summary   Mild concentric left ventricular hypertrophy. There is doppler evidence of stage I diastolic dysfunction. Ejection fraction is visually estimated at 60 to 65%. Normal right ventricular size and function. Mild tricuspid regurgitation. CLEMENCIA 22: EF 65% [place]    TTE 21 [place]   Summary   Technically difficult examination. Mild concentric left ventricular hypertrophy. There is mild mid cavity gradient   Ejection fraction is visually estimated at 55 to 60%. Normal right ventricular size and function. Mild aortic regurgitation is noted. Mild aortic stenosis. Mild tricuspid regurgitation. .    TTE 2019 Saad   Summary   Technically sub-optimal images. Normal left ventricular chamber size. Mild to moderate global hypokinesis, LVEF estimated about 35-38%. Mild left ventricular concentric hypertrophy noted. There is doppler evidence of stage I diastolic dysfunction. Normal left atrial pressure. Left atrium is of normal size. Interatrial septum not well visualized but appears intact. Normal right ventricle structure and function. No mitral valve prolapse. Normal aortic root size. No evidence of pericardial effusion.    Pericardium appears normal.   The inferior vena cava diameter is normal with decreased respiratory   variation. No intracardiac mass. Compared to prior study from 2017 - which showed EF 35-38% and mild MR. Stress test:    Pharm stress test 6/6/2015 fixed septal as well as apical defectwith no reversible ischemia, inferior wall mild hypokinesis, abnormal septal motion, EF 50%    Cardiac catheterization:   2006- normal coronaries    -------------------------------------------------------------------------------------------------------------------------------------------------------------  IMPRESSION:  1. Nonsustained ventricular tachycardia, in the setting of hypomagnesemia. 2. Elevated hs-cTnT: 84-85 ng/L. Likely chronic myocardial injury. No evidence of ACS, no chest pain  3. Reported nonischemic cardiomyopathy. EF 35 to 40% 2019. Normalized on recent echo 1/2022.  4. Hypomagnesemia 1.3  5. Sepsis/UTI  6. CECILY/CKD, history of renal transplant. Creatinine 1.7 -> 1.1  7. UTI/sepsis  8. Chronic left bundle branch block  9. DVT left femoral and popliteal 2/2022 with IVC filter placed  10. PAD status post femoral bypass  11. Recent C. difficile colitis  12. Hypertension  13. Hyperlipidemia  14. Anemia Hgb 7.9 macrocytic  15. Hypothyroidism  16. Morbid obesity BMI 43 kg/m²  17. COVID-19 1/2022  18. Bowel obstruction 1/10/2022 status post laparoscopy and lysis of adhesions  19.  Nonambulatory/deconditioning      RECOMMENDATIONS:     4 g IV magnesium sulfate ordered and 40 mEq of p.o. KCl   Maintain K > 4.0 and magnesium > 2.0   If continues to have ventricular arrhythmias despite correction of electrolytes, would repeat a limited echo to ensure her EF did not deteriorate again   Eventual ischemic evaluation when acute issues resolve, could be done as outpatient   Continue metoprolol and current cardiac medication otherwise   On apixaban for recent DVT   Will be available as needed, please call if needed or if recurrent arrhythmias occur   Outpatient follow-up with Dr. Maida Fagan    Thank you for allowing me to participate in your patient's care. Please feel free to contact me if you have any questions or concerns. Hailee Rodríguez MD, [address] [place] [address] Cardiology    NOTE: This report was transcribed using voice recognition software. Every effort was made to ensure accuracy; however, inadvertent computerized transcription errors may be present.

## 2022-04-25 NOTE — PROGRESS NOTES
Physical Therapy  Room #:   4943/9772-48    Date: 2022       Patient Name: Mavis Akers  : 1955      MRN: [id number]     Patient unavailable for physical therapy treatment due to refusal d/t diarrhea.       Xavi Groves, PT

## 2022-04-25 NOTE — PLAN OF CARE

## 2022-04-25 NOTE — PROGRESS NOTES
Physician Progress Note      PATIENT:               Smooth Gregorio  CSN #:                  [id number]  :                       1955  ADMIT DATE:       2022 4:45 PM  [address] [place] DATE:  RESPONDING  PROVIDER #:        Marco Antonio Stanley MD        QUERY TEXT:    Stage of Chronic Kidney Disease: Please provide further specificity, if known. Clinical indicators include: chronic renal failure, creatinine, dialysis, ckd,   hemodialysis, bun, gfr  Options provided:  -- Chronic kidney disease stage 1  -- Chronic kidney disease stage 2  -- Chronic kidney disease stage 3  -- Chronic kidney disease stage 3a  -- Chronic kidney disease stage 3b  -- Chronic kidney disease stage 4  -- Chronic kidney disease stage 5  -- Chronic kidney disease stage 5, requiring dialysis  -- End stage renal disease  -- Other - I will add my own diagnosis  -- Disagree - Not applicable / Not valid  -- Disagree - Clinically Unable to determine / Unknown        PROVIDER RESPONSE TEXT:    The patient has chronic kidney disease stage 2.       Electronically signed by:  Marco Antonio Stanley MD 2022 12:51 PM

## 2022-04-26 VITALS
HEART RATE: 74 BPM | HEIGHT: 61 IN | BODY MASS INDEX: 32.51 KG/M2 | SYSTOLIC BLOOD PRESSURE: 154 MMHG | TEMPERATURE: 97.4 F | DIASTOLIC BLOOD PRESSURE: 87 MMHG | RESPIRATION RATE: 18 BRPM | OXYGEN SATURATION: 92 % | WEIGHT: 172.2 LBS

## 2022-04-26 LAB
ALBUMIN SERPL-MCNC: 3 G/DL (ref 3.5–5.2)
ALP BLD-CCNC: 46 U/L (ref 35–104)
ALT SERPL-CCNC: 6 U/L (ref 0–32)
ANION GAP SERPL CALCULATED.3IONS-SCNC: 8 MMOL/L (ref 7–16)
ANISOCYTOSIS: ABNORMAL
AST SERPL-CCNC: 8 U/L (ref 0–31)
BASOPHILS ABSOLUTE: 0.04 E9/L (ref 0–0.2)
BASOPHILS RELATIVE PERCENT: 0.9 % (ref 0–2)
BILIRUB SERPL-MCNC: 0.3 MG/DL (ref 0–1.2)
BLOOD CULTURE, ROUTINE: NORMAL
BUN BLDV-MCNC: 9 MG/DL (ref 6–23)
CALCIUM SERPL-MCNC: 9.4 MG/DL (ref 8.6–10.2)
CHLORIDE BLD-SCNC: 107 MMOL/L (ref 98–107)
CO2: 26 MMOL/L (ref 22–29)
CREAT SERPL-MCNC: 1 MG/DL (ref 0.5–1)
CULTURE, BLOOD 2: NORMAL
EOSINOPHILS ABSOLUTE: 0.04 E9/L (ref 0.05–0.5)
EOSINOPHILS RELATIVE PERCENT: 0.9 % (ref 0–6)
GFR AFRICAN AMERICAN: >60
GFR NON-AFRICAN AMERICAN: 55 ML/MIN/1.73
GLUCOSE BLD-MCNC: 90 MG/DL (ref 74–99)
HCT VFR BLD CALC: 27.9 % (ref 34–48)
HEMOGLOBIN: 8.5 G/DL (ref 11.5–15.5)
LYMPHOCYTES ABSOLUTE: 0.54 E9/L (ref 1.5–4)
LYMPHOCYTES RELATIVE PERCENT: 12.3 % (ref 20–42)
MAGNESIUM: 2.1 MG/DL (ref 1.6–2.6)
MCH RBC QN AUTO: 32.9 PG (ref 26–35)
MCHC RBC AUTO-ENTMCNC: 30.5 % (ref 32–34.5)
MCV RBC AUTO: 108.1 FL (ref 80–99.9)
MONOCYTES ABSOLUTE: 0.22 E9/L (ref 0.1–0.95)
MONOCYTES RELATIVE PERCENT: 5.3 % (ref 2–12)
NEUTROPHILS ABSOLUTE: 3.65 E9/L (ref 1.8–7.3)
NEUTROPHILS RELATIVE PERCENT: 80.7 % (ref 43–80)
OVALOCYTES: ABNORMAL
PDW BLD-RTO: 18.2 FL (ref 11.5–15)
PLATELET # BLD: 184 E9/L (ref 130–450)
PMV BLD AUTO: 11.2 FL (ref 7–12)
POIKILOCYTES: ABNORMAL
POLYCHROMASIA: ABNORMAL
POTASSIUM SERPL-SCNC: 4.1 MMOL/L (ref 3.5–5)
RBC # BLD: 2.58 E12/L (ref 3.5–5.5)
SARS-COV-2, NAAT: NOT DETECTED
SODIUM BLD-SCNC: 141 MMOL/L (ref 132–146)
TOTAL PROTEIN: 5.1 G/DL (ref 6.4–8.3)
WBC # BLD: 4.5 E9/L (ref 4.5–11.5)

## 2022-04-26 PROCEDURE — 6370000000 HC RX 637 (ALT 250 FOR IP): Performed by: INTERNAL MEDICINE

## 2022-04-26 PROCEDURE — 97530 THERAPEUTIC ACTIVITIES: CPT | Performed by: PHYSICAL THERAPIST

## 2022-04-26 PROCEDURE — 83735 ASSAY OF MAGNESIUM: CPT

## 2022-04-26 PROCEDURE — 36592 COLLECT BLOOD FROM PICC: CPT

## 2022-04-26 PROCEDURE — 6360000002 HC RX W HCPCS: Performed by: INTERNAL MEDICINE

## 2022-04-26 PROCEDURE — 87635 SARS-COV-2 COVID-19 AMP PRB: CPT

## 2022-04-26 PROCEDURE — 36415 COLL VENOUS BLD VENIPUNCTURE: CPT

## 2022-04-26 PROCEDURE — 97530 THERAPEUTIC ACTIVITIES: CPT

## 2022-04-26 PROCEDURE — 80053 COMPREHEN METABOLIC PANEL: CPT

## 2022-04-26 PROCEDURE — 85025 COMPLETE CBC W/AUTO DIFF WBC: CPT

## 2022-04-26 PROCEDURE — 6370000000 HC RX 637 (ALT 250 FOR IP): Performed by: SPECIALIST

## 2022-04-26 RX ADMIN — POTASSIUM CHLORIDE 20 MEQ: 20 TABLET, EXTENDED RELEASE ORAL at 08:41

## 2022-04-26 RX ADMIN — ACYCLOVIR 400 MG: 400 TABLET ORAL at 08:56

## 2022-04-26 RX ADMIN — PANTOPRAZOLE SODIUM 40 MG: 40 TABLET, DELAYED RELEASE ORAL at 06:39

## 2022-04-26 RX ADMIN — LEVOTHYROXINE SODIUM 50 MCG: 0.05 TABLET ORAL at 06:39

## 2022-04-26 RX ADMIN — METOPROLOL TARTRATE 12.5 MG: 25 TABLET, FILM COATED ORAL at 08:40

## 2022-04-26 RX ADMIN — SODIUM BICARBONATE 650 MG TABLET 650 MG: at 08:40

## 2022-04-26 RX ADMIN — ALLOPURINOL 100 MG: 100 TABLET ORAL at 08:41

## 2022-04-26 RX ADMIN — FOLIC ACID 1 MG: 1 TABLET ORAL at 08:41

## 2022-04-26 RX ADMIN — Medication 125 MG: at 11:56

## 2022-04-26 RX ADMIN — ANTI-FUNGAL POWDER MICONAZOLE NITRATE TALC FREE: 1.42 POWDER TOPICAL at 11:08

## 2022-04-26 RX ADMIN — APIXABAN 5 MG: 5 TABLET, FILM COATED ORAL at 08:41

## 2022-04-26 RX ADMIN — PREDNISONE 5 MG: 5 TABLET ORAL at 08:41

## 2022-04-26 RX ADMIN — MYCOPHENOLATE MOFETIL 1000 MG: 250 CAPSULE ORAL at 08:40

## 2022-04-26 RX ADMIN — CYCLOSPORINE 100 MG: 25 CAPSULE, GELATIN COATED ORAL at 08:56

## 2022-04-26 RX ADMIN — ONDANSETRON HYDROCHLORIDE 4 MG: 4 TABLET, FILM COATED ORAL at 11:26

## 2022-04-26 RX ADMIN — Medication 1000 UNITS: at 08:41

## 2022-04-26 RX ADMIN — Medication 1 CAPSULE: at 04:42

## 2022-04-26 ASSESSMENT — PAIN DESCRIPTION - PROGRESSION
CLINICAL_PROGRESSION: NOT CHANGED

## 2022-04-26 ASSESSMENT — PAIN SCALES - WONG BAKER
WONGBAKER_NUMERICALRESPONSE: 0

## 2022-04-26 ASSESSMENT — ENCOUNTER SYMPTOMS: VOMITING: 1

## 2022-04-26 ASSESSMENT — PAIN SCALES - GENERAL: PAINLEVEL_OUTOF10: 0

## 2022-04-26 NOTE — PROGRESS NOTES
Progress Note  Date:2022       Room:[facility]06-  Patient Baby Carloz     YOB: 1955     Age:67 y.o. Subjective    Subjective:  Symptoms:  Improved. (Feels good  , tolerating oral intake well , ). Diet:  Adequate intake. Activity level: Impaired due to weakness. Pain:  She reports no pain. Review of Systems  Objective         Vitals Last 24 Hours:  TEMPERATURE:  Temp  Av.8 °F (36.6 °C)  Min: 97.5 °F (36.4 °C)  Max: 98 °F (36.7 °C)  RESPIRATIONS RANGE: Resp  Av.7  Min: 8  Max: 20  PULSE OXIMETRY RANGE: SpO2  Av %  Min: 94 %  Max: 96 %  PULSE RANGE: Pulse  Av.3  Min: 78  Max: 88  BLOOD PRESSURE RANGE: Systolic (91DIC), HPJ:096 , Min:128 , UCS:273   ; Diastolic (61IHX), YDA:49, Min:58, Max:94    I/O (24Hr): Intake/Output Summary (Last 24 hours) at 2022  Last data filed at 2022 1424  Gross per 24 hour   Intake 360 ml   Output --   Net 360 ml     Objective:  General Appearance:  Comfortable and in no acute distress. Vital signs: (most recent): Blood pressure 128/67, pulse 78, temperature 97.9 °F (36.6 °C), temperature source Infrared, resp. rate 8, height 5' 1\" (1.549 m), weight 226 lb (102.5 kg), SpO2 94 %. Vital signs are normal.    Output: Producing urine and producing stool. HEENT: Normal HEENT exam.    Lungs: There are decreased breath sounds. Heart: Normal rate. Regular rhythm. Positive for murmur. Abdomen: Abdomen is soft and distended. Bowel sounds are normal.   There is no abdominal tenderness. Extremities: There is dependent edema. (+2 both LL )  Neurological: Patient is alert and oriented to person, place and time. Pupils:  Pupils are equal, round, and reactive to light.       Labs/Imaging/Diagnostics    Labs:  CBC:  Recent Labs     22  0620 22  0525 22  0610   WBC 5.9 5.4 5.2   RBC 2.41* 2.41* 2.38*   HGB 7.9* 8.1* 7.9*   HCT 26.4* 26.6* 26.3*   .5* 110.4* 110.5*   RDW 19.0* 18.6* 18.6*    158 163     CHEMISTRIES:  Recent Labs     04/23/22  0620 04/24/22  0525 04/25/22  0610    141 141   K 3.6 3.7 3.7    106 107   CO2 25 26 27   BUN 25* 20 13   CREATININE 1.4* 1.2* 1.1*   GLUCOSE 74 78 85   MG  --   --  1.3*     PT/INR:No results for input(s): PROTIME, INR in the last 72 hours. APTT:No results for input(s): APTT in the last 72 hours. LIVER PROFILE:  Recent Labs     04/23/22  0620 04/24/22  0525 04/25/22  0610   AST 8 8 9   ALT 6 6 6   BILITOT 0.4 0.3 0.3   ALKPHOS 47 45 45       Imaging Last 24 Hours:  XR CHEST PORTABLE    Result Date: 4/21/2022  EXAMINATION: ONE XRAY VIEW OF THE CHEST 4/21/2022 5:29 pm COMPARISON: 03/16/2022 HISTORY: ORDERING SYSTEM PROVIDED HISTORY: r/out PNA TECHNOLOGIST PROVIDED HISTORY: Reason for exam:->r/out PNA FINDINGS: The lungs are without acute focal process. There is no effusion or pneumothorax. Stable heart size. The osseous structures are without acute process. Right internal jugular line tip in the distal SVC. No evidence for pneumonia. Assessment//Plan           Hospital Problems           Last Modified POA    * (Principal) Sepsis ([facility] [address] 4/21/2022 Yes        Assessment:    Condition: In stable condition. Improving. 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 S/p renal transplant 2015 , immunocompromised   dvt in the left LL , on eliquis , and had ivc filter for possible GI bleed then . H/o  c diff colitis last admission still on tapering dose of  vanc . Had some loose stools  If  Still having diarrhea if  Watery will check for cdff of increase the vanc po if more loose stools   hyperlipid continue lipitor   Hypothyroidism continue levothyroxine. bipolar disorder on clozaril and prestiq stable    Chronic diastolic dysfunction CHF compensated. Chronic anemia    Hb stable 7.9 .    SVT , consult cardiology, input appreciated ,   Possible discharge in am    ).        Electronically signed by Nicole Neil MD on 4/23/22 at 2:07 PM EDT

## 2022-04-26 NOTE — PROGRESS NOTES
OCCUPATIONAL THERAPY TREATMENT NOTE     Claudia [facility]  Abdiaziz Mel[place]. [place]         Date:2022  Patient Name: Lu Ramos  MRN: [id number]  : 1955  Room: [address] [place] [address]            Evaluating OT: Braxton Rowland OTR/L #[id number]      Referring Provider and Specific Provider Orders/Date:      22   OT eval and treat  Start:  22,   End:  22,   ONE TIME,   Standing Count:  1 Occurrences,   R         Tori Niño MD       Placement Recommendation: Subacute Rehab         Diagnosis:   1. Sepsis without acute organ dysfunction, due to unspecified organism ([facility] [address]    2. Chronic renal impairment, unspecified CKD stage    3.  Kidney transplant recipient         Surgery: None       Pertinent Medical History:       Past Medical History        Past Medical History:   Diagnosis Date    Abnormal EKG       left bundle branch block    Acute gout involving toe of right foot 2020    Acute on chronic combined systolic (congestive) and diastolic (congestive) heart failure (HCC)      Cancer (HCC)       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    Cerebrovascular disease      Clotting disorder ([facility] [address]      thrombophlebitis after c section delivery    Depression       15 years followed by dr Cherrie Chicas Hemodialysis patient [facility])      History of blood transfusion      Hyperlipidemia      Hypertension      Hypothyroidism      Leg swelling 2020    Lymphedema of both lower extremities 2020    Peripheral vascular disease ([facility] [address]      Renal failure 2012     renal transplant    Thrombophlebitis       after c section delivery    Thyroid disease              Past Surgical History         Past Surgical History:   Procedure Laterality Date     SECTION   1985     one normal delivery    COLECTOMY N/A 1/15/2022     DIAGNOSTIC  LAPAROSCOPY LYSIS OF ADHESIONS performed by Salvador Caputo MD at [address] [facility] CATH LAB PROCEDURE   2006     normal coronaries and 1996 normal coronaries    DIALYSIS FISTULA CREATION   march and july 2012     phase one and two patient will start dialysis when needed   [place]     transfemoral venous bypass grafts    IR IVC FILTER PLACEMENT W IMAGING   2/26/2022     IR IVC FILTER PLACEMENT W IMAGING 2/26/2022 [facility]ED[facility]    KIDNEY TRANSPLANT   2016    KIDNEY TRANSPLANT   2015    KIDNEY TRANSPLANT   2015    PARATHYROIDECTOMY   2006     left parathyroid  implant and parathyroidectomy    TRANSESOPHAGEAL ECHOCARDIOGRAM N/A 2/28/2022     TRANSESOPHAGEAL ECHOCARDIOGRAM WITH BUBBLE STUDY performed by Ap Lopez MD at [address]      Precautions:  Fall Risk, falls and alarm, O2       Assessment of current deficits    [x]? Functional mobility            [x]?ADLs           [x]? Strength                   []?Cognition    [x]? Functional transfers          [x]? IADLs         [x]? Safety Awareness   [x]? Endurance    [x]? Fine Coordination              [x]? Balance      []? Vision/perception    []? Sensation      []? Gross Motor Coordination  [x]? ROM           []?  Delirium                   []? Motor Control      OT PLAN OF CARE   OT POC based on physician orders, patient diagnosis and results of clinical assessment     Frequency/Duration 1-3 days/wk for 2 weeks PRN      Specific OT Treatment Interventions to include:   * Instruction/training on adapted ADL techniques and AE recommendations to increase functional independence within precautions       * Training on energy conservation strategies, correct breathing pattern and techniques to improve independence/tolerance for self-care routine  * Functional transfer/mobility training/DME recommendations for increased independence, safety, and fall prevention  * Patient/Family education to increase follow through with safety techniques and functional independence  * Recommendation of environmental modifications for increased safety with functional transfers/mobility and ADLs  * Therapeutic exercise to improve motor endurance, ROM, and functional strength for ADLs/functional transfers  * Therapeutic activities to facilitate/challenge dynamic balance, stand tolerance for increased safety and independence with ADLs  * Therapeutic activities to facilitate gross/fine motor skills for increased independence with ADLs  * Positioning to improve skin integrity, interaction with environment and functional independence     Recommended Adaptive Equipment: TBD      Home Living: Pt arrived from [facility] Level of Function: Requires assist with ADLs; has been working with rehab on functional transfers and taking steps within parallel bars.      Pain Level: Pt denied pain.         Cognition: A&O: 3/4; Follows 2 step directions              Memory: fair               Sequencing: fair               Problem solving: fair               Judgement/safety: fair      [facility]   AM-PAC Daily Activity Inpatient   How much help for putting on and taking off regular lower body clothing?: Total  How much help for Bathing?: A Lot  How much help for Toileting?: Total  How much help for putting on and taking off regular upper body clothing?: A Lot  How much help for taking care of personal grooming?: A Lot  How much help for eating meals?: A Little  AM[facility] Daily Activity Raw Score: 11  AM-PAC Inpatient ADL T-Scale Score : 29.04  ADL Inpatient CMS 0-100% Score: 70.42  ADL Inpatient CMS G-Code Modifier : CL                Functional Assessment:     Initial Eval Status  Date: 4/22/22    Treatment Status  Date: 4/26/22 STGs = LTGs  Time frame: 10-14 days   Feeding Minimal Assist      SBA pt self feeding upon arrival  Moderate [place]    Grooming Moderate Assist     n/t Supervision    UB Dressing Moderate Assist    n/t  Supervision    LB Dressing Dependent   n/t Minimal Assist    Bathing Moderate/Maximal Assist     n/t Minimal Assist    Toileting Dependent     n/t Minimal Assist    Bed Mobility  Supine to sit: Moderate Assist   Sit to supine: Moderate Assist   Scooting: Moderate Assist to EOB    Supine>sit MOD A pt requiring assist to bring B LE out of bed v/c's for hand placement and safety  Supine to sit: Independent   Sit to supine: Independent    Functional Transfers Sit to stand: Moderate Assist   Stand to sit: Moderate Assist      Transfer training with verbal cues for hand placement to improve safety.     Sit<>stand MOD A pt requiring assist to push from surface and control during descent to chair v/c's for hand placement and safety  Supervision    Functional Mobility Minimal Assist x 2 with wheeled walke to improve balance with side steps to [facility], verbal cues for walker sequence/control and safety.      MIN A x2 less than house hold distances with HHA  Minimal Assist with use of wheeled walker   Balance Sitting:     Static: fair    Dynamic: fair/fair minus  Standing: fair minus     Sitting:  Static: supervision  Dynamic: SBA   Standing: MIN A x2   Sitting:     Static: good    Dynamic: good  Standing: fair plus    Activity Tolerance fair   Fair  Increase standing tolerance >2 minutes for improved engagement with functional transfers and indep in ADLs      Visual/  Perceptual Glasses: Yes, not present at bedside     Reports changes in vision since admission: None reported       NA        Comments: Upon arrival pt supine in bed, agreeable to therapy session. Pt educated with regards to bed mobility, functional transfers, functional mobility, and importance of increased activity. At end of session pt seated in w/c with transport. · Pt has made fair  progress towards set goals.    · Continue with current plan of care      Treatment Time In:1500            Treatment Time Out: 8943                Treatment Charges: Mins Units   Ther Ex  00660     Manual Therapy [address]     Neuro Re-ed [address] [facility] manage/training  [id number]     Non-Billable Time     Total Timed Treatment 13 [address]

## 2022-04-26 NOTE — PLAN OF CARE
Problem: Pain  Goal: Verbalizes/displays adequate comfort level or baseline comfort level  Outcome: Progressing  Flowsheets (Taken 4/22/2022 0015 by Digna Meng RN)  Verbalizes/displays adequate comfort level or baseline comfort level:   Encourage patient to monitor pain and request assistance   Assess pain using appropriate pain scale   Implement non-pharmacological measures as appropriate and evaluate response  Note: Pt denies any pain, assisted with repositioning for comfort     Problem: Safety - Adult  Goal: Free from fall injury  Outcome: Progressing  Note: Safety maintained, call light in reach

## 2022-04-26 NOTE — CARE COORDINATION
SOCIAL WORK / DISCHARGE PLANNING:  COVID neg 4/26. Pt to return to [facility] [place], [address] [facility], fax [phone], skilled. NO PRECERT needed. GINA will need signed by physician. Transport will be arranged by [facility] rep for later this afternoon with [facility]. Addendum: 1059am Transport arranged via [facility] [place] 230pm. Susana WYLIE RN aware.            Electronically signed by AG Durand on 4/26/2022 at 10:47 AM

## 2022-04-26 NOTE — PLAN OF CARE
Problem: Discharge Planning  Goal: Discharge to home or other facility with appropriate resources  Outcome: Progressing     Problem: Pain  Goal: Verbalizes/displays adequate comfort level or baseline comfort level  4/26/2022 1316 by Laura Dooley RN  Outcome: Progressing  4/26/2022 0610 by Gladys Burger RN  Outcome: Progressing  Flowsheets (Taken 4/22/2022 0015 by Digna Meng RN)  Verbalizes/displays adequate comfort level or baseline comfort level:   Encourage patient to monitor pain and request assistance   Assess pain using appropriate pain scale   Implement non-pharmacological measures as appropriate and evaluate response  Note: Pt denies any pain, assisted with repositioning for comfort     Problem: Chronic Conditions and Co-morbidities  Goal: Patient's chronic conditions and co-morbidity symptoms are monitored and maintained or improved  Outcome: Progressing     Problem: Skin/Tissue Integrity  Goal: Absence of new skin breakdown  Description: 1. Monitor for areas of redness and/or skin breakdown  2. Assess vascular access sites hourly  3. Every 4-6 hours minimum:  Change oxygen saturation probe site  4. Every 4-6 hours:  If on nasal continuous positive airway pressure, respiratory therapy assess nares and determine need for appliance change or resting period.   Outcome: Progressing     Problem: Safety - Adult  Goal: Free from fall injury  4/26/2022 1316 by Laura Dooley RN  Outcome: Progressing  4/26/2022 0610 by Gladys Burger RN  Outcome: Progressing  Note: Safety maintained, call light in reach     Problem: ABCDS Injury Assessment  Goal: Absence of physical injury  Outcome: Progressing

## 2022-04-26 NOTE — PROGRESS NOTES
Reviewed discharge instructions with pt, report called to facility. Rt jugular removed  - without complications - pressure held for 5 min.  Pt left via  w/c per [facility]

## 2022-04-26 NOTE — PLAN OF CARE
Problem: Discharge Planning  Goal: Discharge to home or other facility with appropriate resources  4/26/2022 1603 by Julio Davis RN  Outcome: Adequate for Discharge  4/26/2022 1316 by Suzy Pimentel RN  Outcome: Progressing     Problem: Pain  Goal: Verbalizes/displays adequate comfort level or baseline comfort level  4/26/2022 1603 by Julio Davis RN  Outcome: Adequate for Discharge  4/26/2022 1316 by Suzy Pimentel RN  Outcome: Progressing  4/26/2022 0610 by Jim Ricketts RN  Outcome: Progressing  Flowsheets (Taken 4/22/2022 0015 by Digna Meng RN)  Verbalizes/displays adequate comfort level or baseline comfort level:   Encourage patient to monitor pain and request assistance   Assess pain using appropriate pain scale   Implement non-pharmacological measures as appropriate and evaluate response  Note: Pt denies any pain, assisted with repositioning for comfort     Problem: Chronic Conditions and Co-morbidities  Goal: Patient's chronic conditions and co-morbidity symptoms are monitored and maintained or improved  4/26/2022 1603 by Julio Davis RN  Outcome: Adequate for Discharge  4/26/2022 1316 by Suzy Pimentel RN  Outcome: Progressing     Problem: Skin/Tissue Integrity  Goal: Absence of new skin breakdown  Description: 1. Monitor for areas of redness and/or skin breakdown  2. Assess vascular access sites hourly  3. Every 4-6 hours minimum:  Change oxygen saturation probe site  4. Every 4-6 hours:  If on nasal continuous positive airway pressure, respiratory therapy assess nares and determine need for appliance change or resting period.   4/26/2022 1603 by Julio Davis RN  Outcome: Adequate for Discharge  4/26/2022 1316 by Suzy Pimentel RN  Outcome: Progressing     Problem: Safety - Adult  Goal: Free from fall injury  4/26/2022 1603 by Julio Davis RN  Outcome: Adequate for Discharge  4/26/2022 1316 by Suzy Pimentel RN  Outcome: Progressing  4/26/2022 0610 by Jim Ricketts RN  Outcome: Progressing  Note: Safety maintained, call light in reach     Problem: ABCDS Injury Assessment  Goal: Absence of physical injury  4/26/2022 1603 by Yunier Dowd RN  Outcome: Adequate for Discharge  4/26/2022 1316 by Doris Quigley RN  Outcome: Progressing

## 2022-04-26 NOTE — PROGRESS NOTES
[facility]     [address] [place] [address] 80  [place], [address]  Phone [phone]   [phone]      Admit Date: 2022  4:45 PM  Pt Name: Jose Alanis  MRN: [id number]  : 1955  Reason for Consult:    Chief Complaint   Patient presents with    Emesis     pt sent here from nursing home, vomitting onset today     Requesting Physician:  Hermes Clark MD  PCP: Hermes Clark MD  History Obtained From:  patient, chart   ID consulted for Sepsis [facility]) [A41.9]  Kidney transplant recipient [Z94.0]  Chronic renal impairment, unspecified CKD stage [N18.9]  Sepsis without acute organ dysfunction, due to unspecified organism ([address] [A41.9]  on hospital day [address]       Chief Complaint   Patient presents with    Emesis     pt sent here from nursing home, vomitting onset today     HISTORYOF PRESENT ILLNESS   Jose Alanis is a 78 y.o. female who presents with   has a past medical history of Abnormal EKG, Acute gout involving toe of right foot, Acute on chronic combined systolic (congestive) and diastolic (congestive) heart failure ([address] Cancer ([address] Cerebrovascular disease, Clotting disorder ([address] Depression, Hemodialysis patient ([address] History of blood transfusion, Hyperlipidemia, Hypertension, Hypothyroidism, Leg swelling, Lymphedema of both lower extremities, Peripheral vascular disease ([address] Renal failure, Thrombophlebitis, and Thyroid disease.       Emesis       PT CAME IN WITH uti SYMPTOMS SHE DENIES DIARRHEA  HAD N/V  HAS WORKED WITH PT AT [facility]   SHE HAD QUMA246.5 TC 98  ON 1l  WBC8.2 CR1.7  UA BLOOD NITRITE LE LE BACTERIA   HAS H/O CDIFF 3/10/2022  H/O PSEUDOMONAS BACTERURIA 3/9/2022  CURRENTLY ON      cefTRIAXone (ROCEPHIN) 1,000 mg in sterile water 10 mL IV syringe, Q24H     Dos  22   PT IN CHAIR   FOR D/C NO C/O         REVIEW OF SYSTEMS     CONSTITUTIONAL:   No fever, chills, weight loss  ALLERGIES:    No urticaria, hay fever,    EYES:     No blurry vision, loss of vision, eye pain  ENT:      No hearing loss, sore throat  CARDIOVASCULAR:  No chest pain or palpitations  RESPIRATORY:   No cough, sob  ENDOCRINE:    No increase thirst, urination   HEME-LYMPH:   No easy bruising or bleeding  GI:     No nausea, vomiting or diarrhea  :     no  urinary complaints  NEURO:    No seizures, stroke, HA  MUSCULOSKELETAL:  No muscle aches or pain, no joint pain  SKIN:     No rash or itch  PSYCH:    No depression or anxiety    Medications Prior to Admission: diphenhydrAMINE (BENADRYL) 25 MG tablet, Take 25 mg by mouth every 6 hours as needed for Itching or Allergies  cloZAPine (CLOZARIL) 50 MG tablet, Take 50 mg by mouth nightly  nystatin (MYCOSTATIN) 953227 UNIT/GM powder, Apply 1 each topically 3 times daily Apply to Groin  ondansetron (ZOFRAN) 4 MG tablet, Take 4 mg by mouth every 8 hours as needed for Nausea or Vomiting  epoetin casey-epbx (RETACRIT) 2000 UNIT/ML injection, Inject 2,000 Units into the skin See Admin Instructions Every 6 days  acetaminophen (TYLENOL) 325 MG tablet, Take 650 mg by mouth every 4 hours as needed for Pain or Fever   [] vancomycin (VANCOCIN) 125 MG capsule, Take 125 mg by mouth in the morning and at bedtime Twice daily for 10 days.   1st dose administered evening of 2022  vitamin D (CHOLECALCIFEROL) 25 MCG (1000 UT) TABS tablet, Take 1,000 Units by mouth daily  acyclovir (ZOVIRAX) 400 MG tablet, Take 400 mg by mouth 2 times daily  [DISCONTINUED] hydroCHLOROthiazide (HYDRODIURIL) 25 MG tablet, Take 25 mg by mouth daily  midodrine (PROAMATINE) 5 MG tablet, Take 1 tablet by mouth 2 times daily as needed (For SBP < 95)  simethicone (MYLICON) 80 MG chewable tablet, Take 2 tablets by mouth 3 times daily as needed (cramping)  sodium bicarbonate 650 MG tablet, Take 1 tablet by mouth daily  magnesium oxide (MAG-OX) 400 MG tablet, Take 1 tablet by mouth daily  metoprolol tartrate (LOPRESSOR) 25 MG tablet, Take 0.5 tablets by mouth 2 times daily  pantoprazole (PROTONIX) 40 MG tablet, Take 1 tablet by mouth every morning (before breakfast)  apixaban (ELIQUIS) 5 MG TABS tablet, Take 5 mg by mouth 2 times daily   atorvastatin (LIPITOR) 10 MG tablet, Take 10 mg by mouth at bedtime   ipratropium-albuterol (DUONEB) 0.5-2.5 (3) MG/3ML SOLN nebulizer solution, Inhale 3 mLs into the lungs every 6 hours as needed for Shortness of Breath  allopurinol (ZYLOPRIM) 100 MG tablet, Take 100 mg by mouth daily  Cyanocobalamin (B-12 COMPLIANCE INJECTION) 1000 MCG/ML KIT, Inject 1,000 mcg as directed every 28 days   desvenlafaxine succinate (PRISTIQ) 50 MG TB24 extended release tablet, Take 1 tablet by mouth every evening  cycloSPORINE (SANDIMMUNE) 100 MG capsule, Take 100 mg by mouth 2 times daily  predniSONE (DELTASONE) 5 MG tablet, Take 5 mg by mouth every morning   folic acid (FOLVITE) 1 MG tablet, Take 1 mg by mouth daily  docusate sodium (COLACE) 100 MG capsule, Take 100 mg by mouth 2 times daily  mycophenolate (CELLCEPT) 500 MG tablet, Take 1,000 mg by mouth 2 times daily  levothyroxine (SYNTHROID) 50 MCG tablet, Take 50 mcg by mouth daily.     CURRENT MEDICATIONS     Current Facility-Administered Medications:     vancomycin (VANCOCIN) oral solution 125 mg, 125 mg, Oral, 4 times per day, Fidel Staley MD, 125 mg at 04/26/22 1156    fosfomycin tromethamine (MONUROL) 3 g PACK 1 packet, 3 g, Oral, Q3 Days, Alesia Rangel MD, 1 packet at 04/25/22 1126    potassium chloride (KLOR-CON M) extended release tablet 20 mEq, 20 mEq, Oral, Daily, Shree Shultz MD, 20 mEq at 04/26/22 0841    mycophenolate (CELLCEPT) capsule 1,000 mg, 1,000 mg, Oral, BID, Josefa Ramirez MD, 1,000 mg at 04/26/22 0840    cycloSPORINE (SANDIMMUNE) capsule 100 mg, 100 mg, Oral, BID, Fidel Staley MD, 100 mg at 04/26/22 0856    lactobacillus (CULTURELLE) capsule 1 capsule, 1 capsule, Oral, Q12H, Alesia Rangel MD, 1 capsule at 04/26/22 0442   acetaminophen (TYLENOL) tablet 650 mg, 650 mg, Oral, Q4H PRN, Leah Ramirez MD, 650 mg at 04/22/22 0135    acyclovir (ZOVIRAX) tablet 400 mg, 400 mg, Oral, BID, Leah Ramirez MD, 400 mg at 04/26/22 0856    allopurinol (ZYLOPRIM) tablet 100 mg, 100 mg, Oral, Daily, Saad Kumari MD, 100 mg at 04/26/22 0841    apixaban (ELIQUIS) tablet 5 mg, 5 mg, Oral, BID, Susannah Ramirez MD, 5 mg at 04/26/22 0539    atorvastatin (LIPITOR) tablet 10 mg, 10 mg, Oral, Nightly, Susannah Ramirez MD, 10 mg at 04/25/22 2127    cloZAPine (CLOZARIL) tablet 50 mg, 50 mg, Oral, Nightly, Susannah Ramirez MD, 50 mg at 04/25/22 2127    desvenlafaxine succinate (PRISTIQ) extended release tablet 50 mg, 50 mg, Oral, QPM, Saad Kumari MD, 50 mg at 04/25/22 1739    docusate sodium (COLACE) capsule 100 mg, 100 mg, Oral, BID PRN, Saad Kumari MD    folic acid (FOLVITE) tablet 1 mg, 1 mg, Oral, Daily, Saad Kumari MD, 1 mg at 04/26/22 0841    ipratropium-albuterol (DUONEB) nebulizer solution 3 mL, 3 mL, Inhalation, Q6H PRN, Saad Kumari MD    levothyroxine (SYNTHROID) tablet 50 mcg, 50 mcg, Oral, Daily, Saad Kumari MD, 50 mcg at 04/26/22 4137    metoprolol tartrate (LOPRESSOR) tablet 12.5 mg, 12.5 mg, Oral, BID, Saad Kumari MD, 12.5 mg at 04/26/22 0840    midodrine (PROAMATINE) tablet 5 mg, 5 mg, Oral, BID PRN, Saad Kumari MD, 5 mg at 04/24/22 0827    miconazole (MICOTIN) 2 % powder, , Topical, BID, Saad Kumari MD, Given at 04/26/22 1108    ondansetron (ZOFRAN) tablet 4 mg, 4 mg, Oral, Q8H PRN, Saad Kumari MD, 4 mg at 04/26/22 1126    pantoprazole (PROTONIX) tablet 40 mg, 40 mg, Oral, QAM AC, Susannah Ramirez MD, 40 mg at 04/26/22 5007    predniSONE (DELTASONE) tablet 5 mg, 5 mg, Oral, QAM, Leahlena Ramirez MD, 5 mg at 04/26/22 0841    simethicone (MYLICON) chewable tablet 160 mg, 160 mg, Oral, TID PRN, Saad Kumari MD, 160 mg at 04/24/22 1417    sodium bicarbonate tablet 650 mg, 650 mg, Oral, Daily, Brett Ramirez MD, 650 mg at 04/26/22 0840    vitamin D (CHOLECALCIFEROL) tablet 1,000 Units, 1,000 Units, Oral, Daily, Brett Ramirez MD, 1,000 Units at 04/26/22 9099  ALLERGIES     Macrodantin [nitrofurantoin macrocrystal] and Sulfa antibiotics  Immunization History   Administered Date(s) Administered    COVID-19, Pfizer Purple top, DILUTE for use, 12+ yrs, 30mcg/0.3mL dose 01/28/2021, 02/18/2021, 08/18/2021      Internal Administration   First Dose COVID-19, [employer] top, DILUTE for use, 12+ yrs, 30mcg/0.3mL dose  01/28/2021   Second Dose COVID-19, Pfizer Purple top, DILUTE for use, 12+ yrs, 30mcg/0.3mL dose   02/18/2021       Last COVID Lab SARS-CoV-2 (no units)   Date Value   01/20/2021 Not Detected     SARS-CoV-2 Antibody, Total (no units)   Date Value   01/13/2022 Non-Reactive     SARS-CoV-2, PCR (no units)   Date Value   02/25/2022 Not Detected     SARS-CoV-2, NAAT (no units)   Date Value   04/26/2022 Not Detected            PAST MEDICAL HISTORY     Past Medical History:   Diagnosis Date    Abnormal EKG     left bundle branch block    Acute gout involving toe of right foot 09/03/2020    Acute on chronic combined systolic (congestive) and diastolic (congestive) heart failure (HCC)     Cancer (HCC)     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    Cerebrovascular disease     Clotting disorder ([address] 1985    thrombophlebitis after c section delivery    Depression     15 years followed by dr Alyssa Riley Hemodialysis patient [facility])     History of blood transfusion     Hyperlipidemia     Hypertension     Hypothyroidism     Leg swelling 09/03/2020    Lymphedema of both lower extremities 09/03/2020    Peripheral vascular disease ([address]     Renal failure 11/2012    renal transplant    Thrombophlebitis     after c section delivery    Thyroid disease      SURGICAL HISTORY       Past Surgical History:   Procedure Laterality Date   [address]    one normal delivery    COLECTOMY N/A 1/15/2022    DIAGNOSTIC  LAPAROSCOPY LYSIS OF ADHESIONS performed by Kesha Simons MD at [address] CATH LAB PROCEDURE  2006    normal coronaries and 1996 normal coronaries    DIALYSIS FISTULA CREATION  march and july 2012    phase one and two patient will start dialysis when needed   [address]    transfemoral venous bypass grafts    IR IVC FILTER PLACEMENT W IMAGING  2/26/2022    IR IVC FILTER PLACEMENT W IMAGING 2/26/2022 [address] SPECIAL PROCEDURES    KIDNEY TRANSPLANT  2016    KIDNEY TRANSPLANT  2015    KIDNEY TRANSPLANT  2015    PARATHYROIDECTOMY  2006    left parathyroid  implant and parathyroidectomy    TRANSESOPHAGEAL ECHOCARDIOGRAM N/A 2/28/2022    TRANSESOPHAGEAL ECHOCARDIOGRAM WITH BUBBLE STUDY performed by Jag Wesley MD at [address]       Family History   Problem Relation Age of Onset    Diabetes Mother     Diabetes Father     Dementia Father      ·      PHYSICAL EXAM          Vitals:    04/25/22 2000 04/26/22 0437 04/26/22 0505 04/26/22 0830   BP: 128/67 (!) 141/77  (!) 154/87   Pulse: 78 70  74   Resp: 18 18  8   Temp: 97.9 °F (36.6 °C) 97.5 °F (36.4 °C)  97.4 °F (36.3 °C)   TempSrc: Infrared Infrared  Axillary   SpO2: 94% 97%  92%   Weight:   172 lb 3.2 oz (78.1 kg)    Height:         Physical Exam   Constitutional/General: Alert and oriented NAD IN CHAIR   Head: NC/AT   Pulmonary: Lungs dec  to auscultation bilaterally ant   Cardiovascular:  Regular rate and rhythm,  Abdomen: Soft, + BS.    distension. Nontender.     Extremities:    EDEMA   Skin: Warm and dry NO RASH   Neurologic:    No focal deficits follow commands AWAKE AND ALERT  Psych: Normal Affect        DIAGNOSTIC RESULTS   RADIOLOGY:   XR CHEST PORTABLE    Result Date: 4/21/2022  EXAMINATION: ONE XRAY VIEW OF THE CHEST 4/21/2022 5:29 pm COMPARISON: 03/16/2022 HISTORY: ORDERING SYSTEM PROVIDED HISTORY: r/out PNA TECHNOLOGIST PROVIDED HISTORY: Reason for exam:->r/out PNA FINDINGS: The lungs are without acute focal process. There is no effusion or pneumothorax. Stable heart size. The osseous structures are without acute process. Right internal jugular line tip in the distal SVC. No evidence for pneumonia. LABS  Recent Labs     04/24/22  0525 04/25/22  0610 04/26/22  0510   WBC 5.4 5.2 4.5   HGB 8.1* 7.9* 8.5*   HCT 26.6* 26.3* 27.9*   .4* 110.5* 108.1*    163 184     Recent Labs     04/24/22  0525 04/24/22  0525 04/25/22  0610 04/25/22  0610 04/26/22  0510     --  141  --  141   K 3.7   < > 3.7   < > 4.1      < > 107   < > 107   CO2 26   < > 27   < > 26   BUN 20  --  13  --  9   CREATININE 1.2*  --  1.1*  --  1.0   GFRAA 54  --  >60  --  >60   LABGLOM 45  --  50  --  55   GLUCOSE 78  --  85  --  90   PROT 4.8*  --  4.7*  --  5.1*   LABALBU 2.7*  --  2.9*  --  3.0*   CALCIUM 9.4  --  9.2  --  9.4   BILITOT 0.3  --  0.3  --  0.3   ALKPHOS 45  --  45  --  46   AST 8  --  9  --  8   ALT 6  --  6  --  6    < > = values in this interval not displayed. No results for input(s): PROCAL in the last 72 hours.   Lab Results   Component Value Date    CRP 18.3 (H) 01/23/2022    CRP 34.6 (H) 11/17/2021     Lab Results   Component Value Date    SEDRATE 61 (H) 01/23/2022    SEDRATE 62 (H) 11/20/2021     No results found for: [id number]  Lab Results   Component Value Date    ADVIRPCR Not Detected 02/25/2022    BPARAPPCR Not Detected 02/25/2022    BPPTXPPCR Not Detected 02/25/2022    CHLPNEPCR Not Detected 02/25/2022    [id number] Not Detected 02/25/2022    NHCKXA0DFH Not Detected 02/25/2022    DPDQM22UHY Not Detected 02/25/2022    YCFYH98OFX Not Detected 02/25/2022    COVID19 Not Detected 04/26/2022    COVID19 Not Detected 02/25/2022    [facility] Not Detected 02/25/2022    RHENTPCR Not Detected 02/25/2022    FLUBPCR Not Detected 04/21/2022    [facility] Not Detected 02/25/2022    [id number] Not Detected 02/25/2022 [id number] Not Detected 02/25/2022    QSZCCHK2HVK Not Detected 02/25/2022    RNQSESM2QXR Not Detected 02/25/2022    RSVPCR Not Detected 02/25/2022          Lab Results   Component Value Date    COVID19 Not Detected 04/26/2022    COVID19 Not Detected 02/25/2022     COVID-19/MARINA-COV2 LABS  Recent Labs     04/24/22  0525 04/25/22  0610 04/26/22  0510   AST 8 9 8   ALT 6 6 6        MICROBIOLOGY:     Lab Results   Component Value Date    BC 24 Hours no growth 04/21/2022    BC 5 Days no growth 03/09/2022    BC 5 Days no growth 02/27/2022    ORG Pseudomonas aeruginosa 03/09/2022    ORG Staphylococcus coagulase-negative 02/25/2022    ORG Enterococcus faecalis 01/12/2022          WOUND/ABSCESS   Date Value Ref Range Status   01/24/2022 Growth not present  Final        Urine Culture, Routine   Date Value Ref Range Status   04/21/2022   Final    10 to 100,000 CFU/mL  Mixed rubio isolated. Further workup and sensitivity testing  is not routinely indicated and will not be performed.   Mixed rubio isolated includes:  Mixed gram negative rods  Gram positive organism     03/09/2022 75,000 CFU/ml  Final   02/26/2022 Growth not present  Final     MRSA Culture Only   Date Value Ref Range Status   11/17/2021 Methicillin resistant Staph aureus not isolated  Final          FINAL IMPRESSION    Patient is a 78 y.o. female who presented with   Chief Complaint   Patient presents with    Emesis     pt sent here from nursing home, vomitting onset today    and admitted for Sepsis ([facility] [A41.9]  Kidney transplant recipient [Z94.0]  Chronic renal impairment, unspecified CKD stage [N18.9]  Sepsis without acute organ dysfunction, due to unspecified organism ([address] [A41.9]   IMMUNOCOMPROMISED PT  AFEBRILE      H/O CDIFF   H/o Pseudomonas bacteriuria   SWITCH  MEROPENEM TO FOSFOMYCIN    Check cultures  MIXED RUBIO      urine cx mixed rubio    Watch for reoccurrence of cdiff ON CULTURELLE   PT  MED REC FOR FOSFOMYCIN  F/U 2-3 WEEKS  REMOVE TLC ON D/C    Imaging and labs were reviewed per medical records  An opportunity to ask questions was given to the patient/FAMILY and questions were answered. Thank you for involving me in the care of Lorenzo Palmer. Please do not hesitate to call for any questions or concerns.          Electronically signed by Peter Rodríguez MD on 4/26/2022 at 1:54 PM

## 2022-04-26 NOTE — PROGRESS NOTES
Physical Therapy  Physical Therapy Treatment Note/Plan of Care    Room #:  0635/0635-01  Patient Name: Ronni Cisneros  YOB: 1955  MRN: [id number]    Date of Service: 4/26/2022     Tentative placement recommendation: Subacute rehab  Equipment recommendation:  To be determined      Evaluating Physical Therapist: Heraclio Light PT, DPT #[id number]      Specific Provider Orders/Date/Referring Provider :     04/22/22 0015   PT eval and treat Start: 04/22/22 0015, End: 04/22/22 0015, ONE TIME, Standing Count: 1 Occurrences, Carlitos Stephenson MD Acknowledge New    Admitting Diagnosis:   Sepsis ([address] [A41.9]  Kidney transplant recipient [Z94.0]  Chronic renal impairment, unspecified CKD stage [N18.9]  Sepsis without acute organ dysfunction, due to unspecified organism [facility]) [A41.9]      Surgery: none  Visit Diagnoses       Codes    Chronic renal impairment, unspecified CKD stage     N18.9    Kidney transplant recipient     Z94.0    Acute cystitis with hematuria     N30.01          Patient Active Problem List   Diagnosis    Peripheral vascular disease ([address]    Depression    Clotting disorder ([address]    Abnormal EKG    Cancer ([address]    Over weight    Kidney transplanted    ESRD (end stage renal disease) ([address]    Non morbid obesity due to excess calories    Hypertension    Leg swelling    Lymphedema of both lower extremities    Acute gout involving toe of right foot    Sepsis secondary to UTI ([address]    Acute kidney injury superimposed on CKD ([address]    Thyroid disease    Acute on chronic combined systolic (congestive) and diastolic (congestive) heart failure ([address]    Sepsis ([address]    Acute renal failure ([address]    CECILY (acute kidney injury) ([address]    Ischemic bowel disease ([address]    Altered mental status    Coma ([address]    Acute respiratory failure with hypoxia ([address]    Hypoxia    Acute metabolic encephalopathy    Acute deep vein thrombosis (DVT) of femoral vein of left lower extremity ([address]    Anemia    Positive blood culture    Difficult intravenous access    Paroxysmal atrial fibrillation (HCC)    Hypotension    LBBB (left bundle branch block)    History of DVT (deep vein thrombosis)    Chronic anticoagulation    Acute respiratory failure with hypoxemia (HCC)        ASSESSMENT of Current Deficits Patient exhibits decreased strength, balance and endurance impairing functional mobility, transfers, gait , gait distance and tolerance to activity. Pt required increased time for all mobility and was mild-moderately unsteady upon standing and side stepping along bed. Pt agreeable to seated exercises sitting EOB following side stepping. PT returned later in the day to return pt from chair back to bed with MaxA required for chair transfer. PHYSICAL THERAPY  PLAN OF CARE       Physical therapy plan of care is established based on physician order,  patient diagnosis and clinical assessment    Current Treatment Recommendations:    -Bed Mobility: Lower extremity exercises  and Trunk control activities   -Sitting Balance: Incorporate reaching activities to activate trunk muscles , Hands on support to maintain midline , Facilitate active trunk muscle engagement , Facilitate postural control in all planes  and Engage in core activities to allow for movement within base of support   -Standing Balance: Perform strengthening exercises in standing to promote motor control with or without upper extremity support , Instruct patient on adequate base of support to maintain balance and Challenge balance utilizing reaching  activities beyond center of gravity    -Transfers: Provide instruction on proper hand and foot position for adequate transfer of weight onto lower extremities and use of gait device if needed, Cues for hand placement, technique and safety.  Provide stabilization to prevent fall , Facilitate weight shift forward on to lower extremities and provide necessary stabilization of bilateral lower extremities , Support transfer of weight on to lower extremities and Assist with extension of knees trunk and hip to accept weight transfer   -Gait: Gait training, Standing activities to improve: base of support, weight shift, weight bearing , Exercises to improve trunk control, Exercises to improve hip and knee control, Performance of protected weight bearing activities and Activities to increase weight bearing   -Endurance: Utilize Supervised activities to increase level of endurance to allow for safe functional mobility including transfers and gait  and Use graduated activities to promote good breathing techniques and provide support and education to maximize respiratory function    PT long term treatment goals are located in below grid    Patient and or family understand(s) diagnosis, prognosis, and plan of care.     Frequency of treatments: Patient will be seen  dailyyy         Prior Level of Function: Patient stood and took a few steps in parallel bars  Rehab Potential: good  - for baseline    Past medical history:   Past Medical History:   Diagnosis Date    Abnormal EKG     left bundle branch block    Acute gout involving toe of right foot 09/03/2020    Acute on chronic combined systolic (congestive) and diastolic (congestive) heart failure (HCC)     Cancer (HCC)     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    Cerebrovascular disease     Clotting disorder ([facility] 1985    thrombophlebitis after c section delivery    Depression     15 years followed by dr Sandoval [facility] patient [facility])     History of blood transfusion     Hyperlipidemia     Hypertension     Hypothyroidism     Leg swelling 09/03/2020    Lymphedema of both lower extremities 09/03/2020    Peripheral vascular disease ([facility]     Renal failure 11/2012    renal transplant    Thrombophlebitis     after c section delivery    Thyroid disease      Past Surgical History:   Procedure Laterality Date   [address] [facility] one normal delivery    COLECTOMY N/A 1/15/2022    DIAGNOSTIC  LAPAROSCOPY LYSIS OF ADHESIONS performed by Nathan Adamson MD at [address] CATH LAB PROCEDURE  2006    normal coronaries and 1996 normal coronaries    DIALYSIS FISTULA CREATION  march and july 2012    phase one and two patient will start dialysis when needed   [address]    transfemoral venous bypass grafts    IR IVC FILTER PLACEMENT W IMAGING  2/26/2022    IR IVC FILTER PLACEMENT W IMAGING 2/26/2022 [facility] SPECIAL PROCEDURES    KIDNEY TRANSPLANT  2016    KIDNEY TRANSPLANT  2015    KIDNEY TRANSPLANT  2015    PARATHYROIDECTOMY  2006    left parathyroid  implant and parathyroidectomy    TRANSESOPHAGEAL ECHOCARDIOGRAM N/A 2/28/2022    TRANSESOPHAGEAL ECHOCARDIOGRAM WITH BUBBLE STUDY performed by Rashaad Doe MD at [address]:    Precautions: Up with assistance, falls, O2 and B knees buckle   Social history: Patient coming from [address] [place] [address] owned: [employer] and [address]   How much difficulty turning over in bed?: A Little  How much difficulty sitting down on / standing up from a chair with arms?: A Lot  How much difficulty moving from lying on back to sitting on side of bed?: A Little  How much help from another person moving to and from a bed to a chair?: A Lot  How much help from another person needed to walk in hospital room?: Total  How much help from another person for climbing 3-5 steps with a railing?: Total  AM-PAC Inpatient Mobility Raw Score : 12  AM-PAC Inpatient T-Scale Score : 35.33  Mobility Inpatient CMS 0-100% Score: 68.66  Mobility Inpatient CMS G-Code Modifier : CL    Nursing cleared patient for PT treatment. OBJECTIVE;   Initial Evaluation  Date: 4/22/2022 Treatment Date:    4/26/2022   Short Term/ Long Term   Goals   Was pt agreeable to Eval/treatment?  Yes Y To be met in 2 days   Pain level   0/10   0/10    Bed Mobility    Rolling: Minimal assist of 1    Supine to sit: Minimal assist of 1    Sit to supine: Minimal assist of 1    Scooting: Minimal assist of 1   Rolling: Minimal assist of 1   Supine to sit: Minimal assist of 1   Sit to supine: Minimal assist of 1   Scooting: Minimal assist of 1    Rolling: Independent    Supine to sit: Independent    Sit to supine: Independent    Scooting: Independent     Transfers Sit to stand: Moderate assist of 1  Sit to stand:  Moderate assist of 1 from bed, MaxA for chair     Sit to stand: SBA    Ambulation    10 side steps using  wheeled walker with Moderate assist of 1   for walker control, walker approximation, balance, upright and safety 2 x 5-7 feet using  wheeled walker with Min-ModA   for walker control, balance, upright and safety    > 25 feet using  wheeled walker with Minimal assist of 1    Stair negotiation: ascended and descended   Not assessed       ROM Within functional limits    Increase range of motion 10% of affected joints    Strength BUE:  refer to OT eval  RLE:  3+/5  LLE:  3+/5  Increase strength in affected mm groups by 1/3 grade   Balance Sitting EOB:  fair   Dynamic Standing:  fair  Sitting EOB: fair   Dynamic Standing: fair    Sitting EOB:  fair +  Dynamic Standing: fair +     Patient is Alert & Oriented x person, place, time and situation and follows directions    Sensation:  Patient  denies numbness/tingling   Edema:  no   Endurance: fair  -    Vitals: 2 liters nasal cannula   Blood Pressure at rest  Blood Pressure during session    Heart Rate at rest  Heart Rate during session    SPO2 at rest 99%  SPO2 during session 94-99%     Patient education  Patient educated on role of Physical Therapy, risks of immobility, safety and plan of care, energy conservation,  importance of mobility while in hospital , purse lip breathing, ankle pumps, quad set and glut set for edema control, blood clot prevention, importance and purpose of adaptive device and adjusted to proper height for the patient. , safety  and O2 line management and safety      Patient response to education:   Pt verbalized understanding Pt demonstrated skill Pt requires further education in this area   Yes Partial Yes      Treatment:  Patient practiced and was instructed/facilitated in the following treatment: Patient Sat edge of bed 15 minutes with Supervision  to increase dynamic sitting balance and activity tolerance. Pt performed bed mobility, transfers, side stepped along bed, seated exercises. Therapeutic Exercises:  ankle pumps, heel raises, long arc quad and seated marching  x 10 reps x 2 sets       At end of session, patient in bed with     call light and phone within reach,  all lines and tubes intact, nursing notified. Patient would benefit from continued skilled Physical Therapy to improve functional independence and quality of life.          Patient's/ family goals   rehab    Time in  1035  Time out  1113  And   Time in   1313  Time out  1322    Total Treatment Time  47 minutes    CPT codes:  Therapeutic activities (78924)   47 minutes  3 unit(s)    Nida Langley, PT

## 2022-04-27 NOTE — DISCHARGE SUMMARY
[address]                               DISCHARGE SUMMARY    PATIENT NAME: Miguel Angel Martínez              :        1955  MED REC NO:   [id number]                            ROOM:       5215  ACCOUNT NO:   [de-identified]                           ADMIT DATE: 2022  PROVIDER:     Cm Case MD                  DISCHARGE DATE:  2022    DISCHARGE DIAGNOSES:  1. Sepsis. 2.  Urinary tract infection contributing to sepsis. 3.  Chronic renal failure, status post renal transplant in . 4.  Hypertension. 5.  Hyperlipidemia. 6.  Hypothyroidism. 7.  Bipolar disorder. 8.  Generalized weakness. 9.  Diarrhea. 10.  Obesity. 11.  Left mid and distal femoral and popliteal vein DVT, on Eliquis. 12.  History of herpes encephalitis in 2022. 13. SVT. 14.  IVC filter placed in 2022, possible GI bleed and having DVT. HOSPITAL COURSE:  This is a 67-year-old white lady with significant past  medical history as stated above who presented to the emergency room  because she had vomiting and nausea and some pain with urination for the  last couple of days. When she came in the emergency room, she was found  to be hypotensive with blood pressure in the 70s and she had a fever of  101. She was given fluids in the emergency room and that brought up her  pressure nicely. She did not need any pressors. Her urine showed  leukocytes. She was given one dose of vancomycin and Zosyn, was  admitted for further evaluation and treatment. Blood cultures were  drawn and they were negative. Infectious Disease was consulted, and she  was placed on meropenem instead of the Rocephin secondary to having a  Pseudomonas in the past.  Urine culture this admission showed mixed  rubio. Meropenem was then switched to fosfomycin.   Her baseline  creatinine was about 1.5, but upon discharge, it was actually 1.0 which  is the best it has been for a long time. She was on tapering dose of  vancomycin orally due to diarrhea and after that was started, she  started having more loose stools again. She was then placed again on  vancomycin oral.  The patient was doing very well, but she does have  generalized weakness and needs a lot of rehab. She is unable to weight  bear on her feet, stand. She also had a run of SVT and [facility]  was consulted. Dr. Alberto Flores gave her magnesium as her magnesium was  slightly low. There was no recurrence of her SVT, and he felt that  there was no need for any intervention at this point or more  investigations. The patient was then discharged back to [facility] for rehab. DISCHARGE MEDICATIONS:  She was discharged on the following medications:  Vancomycin 125 mg p.o. four times a day for 10 days, probiotic 1 tablet  twice a day, fosfomycin 1 packet every 3 days for 14 days, Benadryl 25  mg at bedtime as needed for itching, Clozaril 50 mg at bedtime, Zofran 4  mg every 8 hours as needed, Retacrit 2000 units every 3 weeks, Tylenol  650 q.6 p.r.n., vitamin D 2000 units daily, acyclovir 400 mg twice a  day, midodrine 5 mg twice a day as needed for systolic blood pressure  less than 90, simethicone 80 mg two tablets three times a day as needed,  sodium bicarb 650 mg daily, magnesium oxide 400 mg, Lopressor 12.5 mg  twice a day, Protonix 40 mg daily, Eliquis 5 mg twice a day, Lipitor 10  mg daily, allopurinol 100 mg daily, Pristiq 50 mg daily, cyclosporine  100 mg twice a day, prednisone 5 mg daily, folic acid 1 mg daily,  CellCept 1000 mg twice a day, Synthroid 50 mcg daily. The patient is to follow up with myself in the office after discharge  from rehab. She is supposed to follow up with her nephrologist within  the next 3 weeks. If there is any recurrence of symptoms or problems,  to let us know.         Deepali Quevedo MD    D: 04/26/2022 23:39:00       T: 04/26/2022 23:45:04 [id number]  Job#: [id number]     Doc#: [id number]    CC:

## 2022-05-12 ENCOUNTER — CLINICAL DOCUMENTATION (OUTPATIENT)
Dept: INFECTIOUS DISEASES | Age: 67
End: 2022-05-12

## 2022-05-12 NOTE — PROGRESS NOTES
[facility]    URINE CX WITH pseudomonas AND esbl E COLI    ORDERED AUGMENTIN AND CIPRO FOR 2 WEEKS  PT TO F/U IN NEXT AVAILABLE APPOINTMENT    Justo Herrera MD  1:18 PM

## 2022-05-17 ENCOUNTER — APPOINTMENT (OUTPATIENT)
Dept: GENERAL RADIOLOGY | Age: 67
DRG: 191 | End: 2022-05-17
Payer: MEDICARE

## 2022-05-17 ENCOUNTER — HOSPITAL ENCOUNTER (INPATIENT)
Age: 67
LOS: 5 days | Discharge: HOME | DRG: 191 | End: 2022-05-24
Attending: EMERGENCY MEDICINE | Admitting: INTERNAL MEDICINE
Payer: MEDICARE

## 2022-05-17 ENCOUNTER — APPOINTMENT (OUTPATIENT)
Dept: ULTRASOUND IMAGING | Age: 67
DRG: 191 | End: 2022-05-17
Payer: MEDICARE

## 2022-05-17 DIAGNOSIS — N18.30 CHRONIC RENAL INSUFFICIENCY, STAGE 3 (MODERATE) (HCC): ICD-10-CM

## 2022-05-17 DIAGNOSIS — R06.00 DYSPNEA, UNSPECIFIED TYPE: Primary | ICD-10-CM

## 2022-05-17 LAB
ALBUMIN SERPL-MCNC: 3.5 G/DL (ref 3.5–5.2)
ALP BLD-CCNC: 85 U/L (ref 35–104)
ALT SERPL-CCNC: 11 U/L (ref 0–32)
ANION GAP SERPL CALCULATED.3IONS-SCNC: 11 MMOL/L (ref 7–16)
ANISOCYTOSIS: ABNORMAL
APTT: 26.3 SEC (ref 24.5–35.1)
AST SERPL-CCNC: 20 U/L (ref 0–31)
BASOPHILS ABSOLUTE: 0 E9/L (ref 0–0.2)
BASOPHILS RELATIVE PERCENT: 0.3 % (ref 0–2)
BILIRUB SERPL-MCNC: 0.5 MG/DL (ref 0–1.2)
BUN BLDV-MCNC: 24 MG/DL (ref 6–23)
CALCIUM SERPL-MCNC: 10.4 MG/DL (ref 8.6–10.2)
CHLORIDE BLD-SCNC: 98 MMOL/L (ref 98–107)
CO2: 31 MMOL/L (ref 22–29)
CREAT SERPL-MCNC: 2 MG/DL (ref 0.5–1)
D DIMER: 391 NG/ML DDU
EKG ATRIAL RATE: 82 BPM
EKG P AXIS: 51 DEGREES
EKG P-R INTERVAL: 170 MS
EKG Q-T INTERVAL: 412 MS
EKG QRS DURATION: 148 MS
EKG QTC CALCULATION (BAZETT): 481 MS
EKG R AXIS: -49 DEGREES
EKG T AXIS: 108 DEGREES
EKG VENTRICULAR RATE: 82 BPM
EOSINOPHILS ABSOLUTE: 0 E9/L (ref 0.05–0.5)
EOSINOPHILS RELATIVE PERCENT: 0.3 % (ref 0–6)
GFR AFRICAN AMERICAN: 30
GFR NON-AFRICAN AMERICAN: 25 ML/MIN/1.73
GLUCOSE BLD-MCNC: 160 MG/DL (ref 74–99)
HCT VFR BLD CALC: 35.1 % (ref 34–48)
HEMOGLOBIN: 10.4 G/DL (ref 11.5–15.5)
HYPOCHROMIA: ABNORMAL
INR BLD: 1
LACTIC ACID, SEPSIS: 1.1 MMOL/L (ref 0.5–1.9)
LYMPHOCYTES ABSOLUTE: 0.26 E9/L (ref 1.5–4)
LYMPHOCYTES RELATIVE PERCENT: 4.3 % (ref 20–42)
MAGNESIUM: 1.9 MG/DL (ref 1.6–2.6)
MCH RBC QN AUTO: 33 PG (ref 26–35)
MCHC RBC AUTO-ENTMCNC: 29.6 % (ref 32–34.5)
MCV RBC AUTO: 111.4 FL (ref 80–99.9)
MONOCYTES ABSOLUTE: 0 E9/L (ref 0.1–0.95)
MONOCYTES RELATIVE PERCENT: 5.1 % (ref 2–12)
MYELOCYTE PERCENT: 1.7 % (ref 0–0)
NEUTROPHILS ABSOLUTE: 6.34 E9/L (ref 1.8–7.3)
NEUTROPHILS RELATIVE PERCENT: 94 % (ref 43–80)
PDW BLD-RTO: 16.4 FL (ref 11.5–15)
PLATELET # BLD: 174 E9/L (ref 130–450)
PMV BLD AUTO: 12.7 FL (ref 7–12)
POLYCHROMASIA: ABNORMAL
POTASSIUM SERPL-SCNC: 4.8 MMOL/L (ref 3.5–5)
PRO-BNP: 5439 PG/ML (ref 0–125)
PROTHROMBIN TIME: 11.4 SEC (ref 9.3–12.4)
RBC # BLD: 3.15 E12/L (ref 3.5–5.5)
REASON FOR REJECTION: NORMAL
REJECTED TEST: NORMAL
SARS-COV-2, NAAT: NOT DETECTED
SODIUM BLD-SCNC: 140 MMOL/L (ref 132–146)
TOTAL PROTEIN: 6.6 G/DL (ref 6.4–8.3)
TROPONIN, HIGH SENSITIVITY: 68 NG/L (ref 0–9)
WBC # BLD: 6.6 E9/L (ref 4.5–11.5)

## 2022-05-17 PROCEDURE — 93005 ELECTROCARDIOGRAM TRACING: CPT | Performed by: EMERGENCY MEDICINE

## 2022-05-17 PROCEDURE — 83880 ASSAY OF NATRIURETIC PEPTIDE: CPT

## 2022-05-17 PROCEDURE — 93970 EXTREMITY STUDY: CPT

## 2022-05-17 PROCEDURE — 2580000003 HC RX 258: Performed by: EMERGENCY MEDICINE

## 2022-05-17 PROCEDURE — 96360 HYDRATION IV INFUSION INIT: CPT

## 2022-05-17 PROCEDURE — 85730 THROMBOPLASTIN TIME PARTIAL: CPT

## 2022-05-17 PROCEDURE — 83735 ASSAY OF MAGNESIUM: CPT

## 2022-05-17 PROCEDURE — 6360000002 HC RX W HCPCS: Performed by: INTERNAL MEDICINE

## 2022-05-17 PROCEDURE — 71045 X-RAY EXAM CHEST 1 VIEW: CPT

## 2022-05-17 PROCEDURE — 6370000000 HC RX 637 (ALT 250 FOR IP): Performed by: INTERNAL MEDICINE

## 2022-05-17 PROCEDURE — 99285 EMERGENCY DEPT VISIT HI MDM: CPT

## 2022-05-17 PROCEDURE — 6370000000 HC RX 637 (ALT 250 FOR IP): Performed by: EMERGENCY MEDICINE

## 2022-05-17 PROCEDURE — 80053 COMPREHEN METABOLIC PANEL: CPT

## 2022-05-17 PROCEDURE — 87040 BLOOD CULTURE FOR BACTERIA: CPT

## 2022-05-17 PROCEDURE — 84484 ASSAY OF TROPONIN QUANT: CPT

## 2022-05-17 PROCEDURE — 2700000000 HC OXYGEN THERAPY PER DAY

## 2022-05-17 PROCEDURE — 96361 HYDRATE IV INFUSION ADD-ON: CPT

## 2022-05-17 PROCEDURE — 87635 SARS-COV-2 COVID-19 AMP PRB: CPT

## 2022-05-17 PROCEDURE — 2500000003 HC RX 250 WO HCPCS: Performed by: INTERNAL MEDICINE

## 2022-05-17 PROCEDURE — 85610 PROTHROMBIN TIME: CPT

## 2022-05-17 PROCEDURE — G0378 HOSPITAL OBSERVATION PER HR: HCPCS

## 2022-05-17 PROCEDURE — 94640 AIRWAY INHALATION TREATMENT: CPT

## 2022-05-17 PROCEDURE — 83605 ASSAY OF LACTIC ACID: CPT

## 2022-05-17 PROCEDURE — 85025 COMPLETE CBC W/AUTO DIFF WBC: CPT

## 2022-05-17 PROCEDURE — 93010 ELECTROCARDIOGRAM REPORT: CPT | Performed by: INTERNAL MEDICINE

## 2022-05-17 PROCEDURE — 85378 FIBRIN DEGRADE SEMIQUANT: CPT

## 2022-05-17 PROCEDURE — 36415 COLL VENOUS BLD VENIPUNCTURE: CPT

## 2022-05-17 RX ORDER — ALBUTEROL SULFATE 2.5 MG/3ML
2.5 SOLUTION RESPIRATORY (INHALATION) EVERY 6 HOURS PRN
Status: ON HOLD | COMMUNITY
End: 2022-05-21 | Stop reason: HOSPADM

## 2022-05-17 RX ORDER — MIDODRINE HYDROCHLORIDE 5 MG/1
5 TABLET ORAL 2 TIMES DAILY PRN
Status: DISCONTINUED | OUTPATIENT
Start: 2022-05-17 | End: 2022-05-24

## 2022-05-17 RX ORDER — MAGNESIUM OXIDE 400 MG/1
400 TABLET ORAL DAILY
Status: DISCONTINUED | OUTPATIENT
Start: 2022-05-18 | End: 2022-05-20 | Stop reason: SDUPTHER

## 2022-05-17 RX ORDER — VANCOMYCIN HYDROCHLORIDE 125 MG/1
125 CAPSULE ORAL 2 TIMES DAILY
COMMUNITY
Start: 2022-05-18 | End: 2022-05-28

## 2022-05-17 RX ORDER — SIMETHICONE 80 MG
160 TABLET,CHEWABLE ORAL 3 TIMES DAILY PRN
Status: DISCONTINUED | OUTPATIENT
Start: 2022-05-17 | End: 2022-05-24 | Stop reason: HOSPADM

## 2022-05-17 RX ORDER — FUROSEMIDE 20 MG/1
20 TABLET ORAL DAILY
Status: ON HOLD | COMMUNITY
End: 2022-05-24 | Stop reason: HOSPADM

## 2022-05-17 RX ORDER — GUAIFENESIN 600 MG/1
600 TABLET, EXTENDED RELEASE ORAL 2 TIMES DAILY
COMMUNITY
End: 2022-09-15

## 2022-05-17 RX ORDER — CYCLOSPORINE 25 MG/1
100 CAPSULE, GELATIN COATED ORAL 2 TIMES DAILY
Status: DISCONTINUED | OUTPATIENT
Start: 2022-05-17 | End: 2022-05-24 | Stop reason: HOSPADM

## 2022-05-17 RX ORDER — IPRATROPIUM BROMIDE AND ALBUTEROL SULFATE 2.5; .5 MG/3ML; MG/3ML
3 SOLUTION RESPIRATORY (INHALATION) ONCE
Status: COMPLETED | OUTPATIENT
Start: 2022-05-17 | End: 2022-05-17

## 2022-05-17 RX ORDER — DESVENLAFAXINE 50 MG/1
50 TABLET, EXTENDED RELEASE ORAL EVERY EVENING
Status: DISCONTINUED | OUTPATIENT
Start: 2022-05-17 | End: 2022-05-24 | Stop reason: HOSPADM

## 2022-05-17 RX ORDER — PANTOPRAZOLE SODIUM 40 MG/1
40 TABLET, DELAYED RELEASE ORAL
Status: DISCONTINUED | OUTPATIENT
Start: 2022-05-18 | End: 2022-05-24 | Stop reason: HOSPADM

## 2022-05-17 RX ORDER — ALBUTEROL SULFATE 2.5 MG/3ML
2.5 SOLUTION RESPIRATORY (INHALATION) EVERY 6 HOURS PRN
Status: DISCONTINUED | OUTPATIENT
Start: 2022-05-17 | End: 2022-05-18

## 2022-05-17 RX ORDER — VANCOMYCIN HYDROCHLORIDE 125 MG/1
125 CAPSULE ORAL EVERY OTHER DAY
COMMUNITY
Start: 2022-06-09 | End: 2022-06-19

## 2022-05-17 RX ORDER — ACETAMINOPHEN 325 MG/1
650 TABLET ORAL EVERY 6 HOURS PRN
Status: DISCONTINUED | OUTPATIENT
Start: 2022-05-17 | End: 2022-05-24 | Stop reason: HOSPADM

## 2022-05-17 RX ORDER — FOLIC ACID 1 MG/1
1 TABLET ORAL DAILY
Status: DISCONTINUED | OUTPATIENT
Start: 2022-05-18 | End: 2022-05-24 | Stop reason: HOSPADM

## 2022-05-17 RX ORDER — VITAMIN B COMPLEX
1000 TABLET ORAL DAILY
Status: DISCONTINUED | OUTPATIENT
Start: 2022-05-18 | End: 2022-05-24 | Stop reason: HOSPADM

## 2022-05-17 RX ORDER — ATORVASTATIN CALCIUM 10 MG/1
10 TABLET, FILM COATED ORAL NIGHTLY
Status: DISCONTINUED | OUTPATIENT
Start: 2022-05-17 | End: 2022-05-24 | Stop reason: HOSPADM

## 2022-05-17 RX ORDER — CLOZAPINE 25 MG/1
50 TABLET ORAL NIGHTLY
Status: DISCONTINUED | OUTPATIENT
Start: 2022-05-17 | End: 2022-05-24 | Stop reason: HOSPADM

## 2022-05-17 RX ORDER — DIPHENHYDRAMINE HCL 25 MG
25 TABLET ORAL EVERY 6 HOURS PRN
Status: DISCONTINUED | OUTPATIENT
Start: 2022-05-17 | End: 2022-05-24 | Stop reason: HOSPADM

## 2022-05-17 RX ORDER — ACYCLOVIR 400 MG/1
400 TABLET ORAL 2 TIMES DAILY
Status: DISCONTINUED | OUTPATIENT
Start: 2022-05-17 | End: 2022-05-24 | Stop reason: HOSPADM

## 2022-05-17 RX ORDER — AMOXICILLIN AND CLAVULANATE POTASSIUM 875; 125 MG/1; MG/1
1 TABLET, FILM COATED ORAL 2 TIMES DAILY
Status: ON HOLD | COMMUNITY
Start: 2022-05-13 | End: 2022-05-24 | Stop reason: SDUPTHER

## 2022-05-17 RX ORDER — GUAIFENESIN 600 MG/1
600 TABLET, EXTENDED RELEASE ORAL 2 TIMES DAILY
Status: DISCONTINUED | OUTPATIENT
Start: 2022-05-17 | End: 2022-05-24 | Stop reason: HOSPADM

## 2022-05-17 RX ORDER — CIPROFLOXACIN 500 MG/1
500 TABLET, FILM COATED ORAL 2 TIMES DAILY
Status: ON HOLD | COMMUNITY
Start: 2022-05-13 | End: 2022-05-24 | Stop reason: SDUPTHER

## 2022-05-17 RX ORDER — MYCOPHENOLATE MOFETIL 500 MG/1
1000 TABLET ORAL 2 TIMES DAILY
Status: DISCONTINUED | OUTPATIENT
Start: 2022-05-17 | End: 2022-05-17 | Stop reason: CLARIF

## 2022-05-17 RX ORDER — MYCOPHENOLATE MOFETIL 250 MG/1
1000 CAPSULE ORAL 2 TIMES DAILY
Status: DISCONTINUED | OUTPATIENT
Start: 2022-05-17 | End: 2022-05-24 | Stop reason: HOSPADM

## 2022-05-17 RX ORDER — BUDESONIDE 0.5 MG/2ML
500 INHALANT ORAL 2 TIMES DAILY
Status: DISCONTINUED | OUTPATIENT
Start: 2022-05-17 | End: 2022-05-24 | Stop reason: HOSPADM

## 2022-05-17 RX ORDER — VANCOMYCIN HYDROCHLORIDE 125 MG/1
125 CAPSULE ORAL EVERY MORNING
COMMUNITY
Start: 2022-05-29 | End: 2022-06-08

## 2022-05-17 RX ORDER — LEVOTHYROXINE SODIUM 0.05 MG/1
50 TABLET ORAL DAILY
Status: DISCONTINUED | OUTPATIENT
Start: 2022-05-18 | End: 2022-05-24 | Stop reason: HOSPADM

## 2022-05-17 RX ORDER — ALLOPURINOL 100 MG/1
100 TABLET ORAL DAILY
Status: DISCONTINUED | OUTPATIENT
Start: 2022-05-18 | End: 2022-05-24 | Stop reason: HOSPADM

## 2022-05-17 RX ORDER — 0.9 % SODIUM CHLORIDE 0.9 %
1000 INTRAVENOUS SOLUTION INTRAVENOUS ONCE
Status: COMPLETED | OUTPATIENT
Start: 2022-05-17 | End: 2022-05-17

## 2022-05-17 RX ORDER — VANCOMYCIN HYDROCHLORIDE 125 MG/1
125 CAPSULE ORAL
COMMUNITY
Start: 2022-06-20 | End: 2022-06-22 | Stop reason: SDUPTHER

## 2022-05-17 RX ORDER — PREDNISONE 1 MG/1
5 TABLET ORAL DAILY
Status: DISCONTINUED | OUTPATIENT
Start: 2022-05-18 | End: 2022-05-19

## 2022-05-17 RX ORDER — VANCOMYCIN HYDROCHLORIDE 125 MG/1
125 CAPSULE ORAL 3 TIMES DAILY
Status: ON HOLD | COMMUNITY
Start: 2022-05-08 | End: 2022-05-21 | Stop reason: HOSPADM

## 2022-05-17 RX ORDER — BUDESONIDE 0.5 MG/2ML
1 INHALANT ORAL 2 TIMES DAILY
COMMUNITY
End: 2022-09-15

## 2022-05-17 RX ORDER — SODIUM BICARBONATE 650 MG/1
650 TABLET ORAL DAILY
Status: DISCONTINUED | OUTPATIENT
Start: 2022-05-18 | End: 2022-05-24 | Stop reason: HOSPADM

## 2022-05-17 RX ADMIN — DESVENLAFAXINE 50 MG: 50 TABLET, EXTENDED RELEASE ORAL at 21:53

## 2022-05-17 RX ADMIN — ATORVASTATIN CALCIUM 10 MG: 10 TABLET, FILM COATED ORAL at 21:52

## 2022-05-17 RX ADMIN — BUDESONIDE 500 MCG: 0.5 INHALANT RESPIRATORY (INHALATION) at 21:20

## 2022-05-17 RX ADMIN — MYCOPHENOLATE MOFETIL 1000 MG: 250 CAPSULE ORAL at 21:53

## 2022-05-17 RX ADMIN — ACYCLOVIR 400 MG: 400 TABLET ORAL at 21:52

## 2022-05-17 RX ADMIN — APIXABAN 5 MG: 5 TABLET, FILM COATED ORAL at 21:52

## 2022-05-17 RX ADMIN — IPRATROPIUM BROMIDE AND ALBUTEROL SULFATE 3 AMPULE: .5; 3 SOLUTION RESPIRATORY (INHALATION) at 12:51

## 2022-05-17 RX ADMIN — Medication 125 MG: at 21:53

## 2022-05-17 RX ADMIN — METOPROLOL TARTRATE 12.5 MG: 25 TABLET, FILM COATED ORAL at 21:52

## 2022-05-17 RX ADMIN — CLOZAPINE 50 MG: 25 TABLET ORAL at 21:53

## 2022-05-17 RX ADMIN — CYCLOSPORINE 100 MG: 25 CAPSULE, GELATIN COATED ORAL at 21:52

## 2022-05-17 RX ADMIN — SODIUM CHLORIDE 1000 ML: 9 INJECTION, SOLUTION INTRAVENOUS at 15:09

## 2022-05-17 RX ADMIN — GUAIFENESIN 600 MG: 600 TABLET, EXTENDED RELEASE ORAL at 22:02

## 2022-05-17 RX ADMIN — ANTI-FUNGAL POWDER MICONAZOLE NITRATE TALC FREE: 1.42 POWDER TOPICAL at 21:54

## 2022-05-17 ASSESSMENT — ENCOUNTER SYMPTOMS
NAUSEA: 0
DIARRHEA: 0
COUGH: 0
SHORTNESS OF BREATH: 1
CHEST TIGHTNESS: 0
ABDOMINAL PAIN: 0
BACK PAIN: 0
SORE THROAT: 0
VOMITING: 0
WHEEZING: 1

## 2022-05-17 ASSESSMENT — PAIN SCALES - WONG BAKER
WONGBAKER_NUMERICALRESPONSE: 0
WONGBAKER_NUMERICALRESPONSE: 0

## 2022-05-17 ASSESSMENT — PAIN SCALES - GENERAL
PAINLEVEL_OUTOF10: 0
PAINLEVEL_OUTOF10: 0

## 2022-05-17 ASSESSMENT — PAIN DESCRIPTION - PROGRESSION: CLINICAL_PROGRESSION: NOT CHANGED

## 2022-05-17 NOTE — PROGRESS NOTES
Radiology Procedure Waiver   Name: Lorenzo Palmer  : 1955  MRN: [id number]    Date:  22    Time: 2:20 PM EDT    Benefits of immediately proceeding with Radiology exam(s) without pre-testing outweigh the risks or are not indicated as specified below and therefore the following is/are being waived:    [] Pregnancy test   [] Patients LMP on-time and regular.   [] Patient had Tubal Ligation or has other Contraception Device. [] Patient  is Menopausal or Premenarcheal.    [] Patient had Full or Partial Hysterectomy. [] Protocol for Iodine allergy    [] MRI Questionnaire     [x] BUN/Creatinine   [] Patient age w/no hx of renal dysfunction. [] Patient on Dialysis. [] Recent Normal Labs.   Electronically signed by Alexander Lamb DO on 22 at 2:20 PM EDT

## 2022-05-17 NOTE — ED PROVIDER NOTES
Chief complaint:  Low oxygen    HPI history provided by the patient and report  Patient resents here, oxygen dependent at the nursing home and apparently has been having low oxygen levels with high blood pressure more so in the mornings but worsening for the last week or so. She does state that when her oxygen is low she can feel it and does feel a little short of breath with it. She denies chest pain or palpitations and denies fevers or new productive cough. No leg pain or swelling. States that she has had a couple of x-rays that were negative. Sent in here for further evaluation. Review of Systems   Constitutional: Negative for chills, diaphoresis, fatigue and fever. HENT: Negative for congestion and sore throat. Respiratory: Positive for shortness of breath and wheezing. Negative for cough and chest tightness. Cardiovascular: Negative for chest pain, palpitations and leg swelling. Gastrointestinal: Negative for abdominal pain, diarrhea, nausea and vomiting. Genitourinary: Negative for dysuria, flank pain, frequency and urgency. Musculoskeletal: Negative for arthralgias, back pain, joint swelling, myalgias, neck pain and neck stiffness. Skin: Negative for rash and wound. Neurological: Negative for dizziness, seizures, syncope, weakness and headaches. All other systems reviewed and are negative. Physical Exam  Vitals and nursing note reviewed. Constitutional:       General: She is awake. She is not in acute distress. Appearance: She is well-developed. She is not ill-appearing, toxic-appearing or diaphoretic. HENT:      Head: Normocephalic and atraumatic. Eyes:      General: No scleral icterus. Pupils: Pupils are equal, round, and reactive to light. Cardiovascular:      Rate and Rhythm: Normal rate and regular rhythm. Heart sounds: Normal heart sounds. No murmur heard.       Pulmonary:      Effort: Pulmonary effort is normal. No tachypnea, accessory muscle usage or respiratory distress. Breath sounds: Decreased air movement present. No stridor or transmitted upper airway sounds. Wheezing present. No decreased breath sounds, rhonchi or rales. Comments: Patient was scattered wheezing in the lung fields with no respiratory distress. Abdominal:      General: Bowel sounds are normal. There is no distension. Palpations: Abdomen is soft. Tenderness: There is no abdominal tenderness. There is no right CVA tenderness, left CVA tenderness, guarding or rebound. Musculoskeletal:         General: No swelling, tenderness, deformity or signs of injury. Cervical back: Normal range of motion and neck supple. No signs of trauma or rigidity. No pain with movement, spinous process tenderness or muscular tenderness. Normal range of motion. Right lower leg: No edema. Left lower leg: No edema. Comments: Arms and legs are neurovascular intact. No pretibial edema or calf pain. Skin:     General: Skin is warm and dry. Coloration: Skin is not jaundiced or pale. Findings: No erythema or rash. Neurological:      General: No focal deficit present. Mental Status: She is alert and oriented to person, place, and time. GCS: GCS eye subscore is 4. GCS verbal subscore is 5. GCS motor subscore is 6. Cranial Nerves: No cranial nerve deficit. Coordination: Coordination normal.   Psychiatric:         Behavior: Behavior is cooperative. Procedures     MDM     ED Course as of 05/17/22 1631   Tue May 17, 2022   1630 Case discussed with Dr. Kathryn Montalvo, detailed overview given, stenosis patient well. In light of her history of clots, off of her Eliquis with increasing dyspnea and hypoxia at the nursing home and with her current renal failure and history of renal transplant CT is not most optimal, patient will be admitted for further evaluation and possible V/Q.   Patient does have a negative ultrasound of her legs and does have an IVC filter in. [NC]      ED Course User Index  [NC] Kadeem Pro DO     EKG Interpretation    Interpreted by emergency department physician    Rhythm: normal sinus   Rate: 82  Axis: left  Ectopy: none  Conduction: left bundle branch block (complete)  ST Segments: no acute change  T Waves: no acute change  Q Waves: none    Clinical Impression: no acute changes  Compared to 2022 there are no acute ischemic changes, left bundle branch old. Kadeem Pro DO     ED Course as of 22 1631   e May 17, 2022   1630 Case discussed with Dr. Yarelis Jacobson, detailed overview given, stenosis patient well. In light of her history of clots, off of her Eliquis with increasing dyspnea and hypoxia at the nursing home and with her current renal failure and history of renal transplant CT is not most optimal, patient will be admitted for further evaluation and possible V/Q. Patient does have a negative ultrasound of her legs and does have an IVC filter in. [NC]      ED Course User Index  [NC] Kadeem Pro DO       --------------------------------------------- PAST HISTORY ---------------------------------------------  Past Medical History:  has a past medical history of Abnormal EKG, Acute gout involving toe of right foot, Acute on chronic combined systolic (congestive) and diastolic (congestive) heart failure ([address] Cancer ([address] Cerebrovascular disease, Clotting disorder ([address] Depression, Hemodialysis patient ([address] History of blood transfusion, Hyperlipidemia, Hypertension, Hypothyroidism, Leg swelling, Lymphedema of both lower extremities, Peripheral vascular disease ([address] Renal failure, Thrombophlebitis, and Thyroid disease. Past Surgical History:  has a past surgical history that includes Diagnostic Cardiac Cath Lab Procedure (); femoral bypass (); parathyroidectomy (); Colonoscopy;  section (); Dialysis fistula creation (march and 2012); Kidney transplant ();  Kidney transplant (2015); Kidney transplant (2015); colectomy (N/A, 1/15/2022); IR GUIDED IVC FILTER PLACEMENT (2/26/2022); and transesophageal echocardiogram (N/A, 2/28/2022). Social History:  reports that she quit smoking about 6 years ago. She has a 20.00 pack-year smoking history. She has never used smokeless tobacco. She reports that she does not drink alcohol and does not use drugs. Family History: family history includes Dementia in her father; Diabetes in her father and mother. The patients home medications have been reviewed.     Allergies: Macrodantin [nitrofurantoin macrocrystal] and Sulfa antibiotics    -------------------------------------------------- RESULTS -------------------------------------------------    LABS:  Results for orders placed or performed during the hospital encounter of 05/17/22   COVID-19, Rapid    Specimen: Nasopharyngeal Swab   Result Value Ref Range    SARS-CoV-2, NAAT Not Detected Not Detected   Lactate, Sepsis   Result Value Ref Range    Lactic Acid, Sepsis 1.1 0.5 - 1.9 mmol/L   CBC with Auto Differential   Result Value Ref Range    WBC 6.4 4.5 - 11.5 E9/L    RBC 3.09 (L) 3.50 - 5.50 E12/L    Hemoglobin 10.4 (L) 11.5 - 15.5 g/dL    Hematocrit 35.0 34.0 - 48.0 %    .3 (H) 80.0 - 99.9 fL    MCH 33.7 26.0 - 35.0 pg    MCHC 29.7 (L) 32.0 - 34.5 %    RDW 16.5 (H) 11.5 - 15.0 fL    Platelets 606 745 - 590 E9/L    MPV 12.1 (H) 7.0 - 12.0 fL    Neutrophils % 0.0 (L) 43.0 - 80.0 %    Immature Granulocytes % 0.8 0.0 - 5.0 %    Lymphocytes % 0.0 (LL) 20.0 - 42.0 %    Monocytes % 0.0 (L) 2.0 - 12.0 %    Eosinophils % 0.0 0.0 - 6.0 %    Basophils % 0.0 0.0 - 2.0 %    Neutrophils Absolute 0.00 (LL) 1.80 - 7.30 E9/L    Immature Granulocytes # 0.80 E9/L    Lymphocytes Absolute 0.00 (L) 1.50 - 4.00 E9/L    Monocytes Absolute 0.00 (L) 0.10 - 0.95 E9/L    Eosinophils Absolute 0.00 (L) 0.05 - 0.50 E9/L    Basophils Absolute 0.00 0.00 - 0.20 E9/L    Anisocytosis 1+     Poikilocytes 1+ Ovalocytes 1+    Comprehensive Metabolic Panel   Result Value Ref Range    Sodium 140 132 - 146 mmol/L    Potassium 4.8 3.5 - 5.0 mmol/L    Chloride 98 98 - 107 mmol/L    CO2 31 (H) 22 - 29 mmol/L    Anion Gap 11 7 - 16 mmol/L    Glucose 160 (H) 74 - 99 mg/dL    BUN 24 (H) 6 - 23 mg/dL    CREATININE 2.0 (H) 0.5 - 1.0 mg/dL    GFR Non-African American 25 >=60 mL/min/1.73    GFR African American 30     Calcium 10.4 (H) 8.6 - 10.2 mg/dL    Total Protein 6.6 6.4 - 8.3 g/dL    Albumin 3.5 3.5 - 5.2 g/dL    Total Bilirubin 0.5 0.0 - 1.2 mg/dL    Alkaline Phosphatase 85 35 - 104 U/L    ALT 11 0 - 32 U/L    AST 20 0 - 31 U/L   Protime-INR   Result Value Ref Range    Protime 11.4 9.3 - 12.4 sec    INR 1.0    APTT   Result Value Ref Range    aPTT 26.3 24.5 - 35.1 sec   D-Dimer, Quantitative   Result Value Ref Range    D-Dimer, Quant 391 ng/mL DDU   Brain Natriuretic Peptide   Result Value Ref Range    Pro-BNP 5,439 (H) 0 - 125 pg/mL   Magnesium   Result Value Ref Range    Magnesium 1.9 1.6 - 2.6 mg/dL   Troponin   Result Value Ref Range    Troponin, High Sensitivity 68 (H) 0 - 9 ng/L   SPECIMEN REJECTION   Result Value Ref Range    Rejected Test trp5     Reason for Rejection see below    EKG 12 Lead   Result Value Ref Range    Ventricular Rate 82 BPM    Atrial Rate 82 BPM    P-R Interval 170 ms    QRS Duration 148 ms    Q-T Interval 412 ms    QTc Calculation (Bazett) 481 ms    P Axis 51 degrees    R Axis -49 degrees    T Axis 108 degrees       RADIOLOGY:  US DUP LOWER EXTREMITIES BILATERAL VENOUS   Final Result   No evidence of DVT in either lower extremity. XR CHEST PORTABLE   Final Result   1. Cardiomegaly. There are no findings of failure or pneumonia.               ------------------------- NURSING NOTES AND VITALS REVIEWED ---------------------------  Date / Time Roomed:  5/17/2022 11:44 AM  ED Bed Assignment:  09/09    The nursing notes within the ED encounter and vital signs as below have been reviewed. Patient Vitals for the past 24 hrs:   BP Temp Resp SpO2 Height Weight   05/17/22 1251 -- -- 20 98 % -- --   05/17/22 1138 (!) 159/81 97.9 °F (36.6 °C) 22 90 % 5' 2\" (1.575 m) 200 lb (90.7 kg)       Oxygen Saturation Interpretation: Abnormal and Improved after treatment    ------------------------------------------ PROGRESS NOTES ------------------------------------------  Re-evaluation(s):  Time: 0687  Patients symptoms are improving  Repeat physical examination is improved    Counseling:  I have spoken with the patient and discussed todays results, in addition to providing specific details for the plan of care and counseling regarding the diagnosis and prognosis. Their questions are answered at this time and they are agreeable with the plan of admission.    --------------------------------- ADDITIONAL PROVIDER NOTES ---------------------------------  Consultations: This patient's ED course included: a personal history and physicial examination, re-evaluation prior to disposition, multiple bedside re-evaluations, cardiac monitoring and continuous pulse oximetry    This patient has remained hemodynamically stable during their ED course. Diagnosis:  1. Dyspnea, unspecified type    2. Chronic renal insufficiency, stage 3 (moderate) ([facility])        Disposition:  Patient's disposition: Admit to telemetry  Patient's condition is stable.          [address] [facility], DO  05/17/22 1639

## 2022-05-17 NOTE — ACP (ADVANCE CARE PLANNING)
Advance Care Planning     Advance Care Planning Activator (Inpatient)  Conversation Note      Date of ACP Conversation: 5/17/2022     Conversation Conducted with: Patient with Decision Making Capacity    ACP Activator: Elihu Eisenmenger, [place]:     Current Designated Health Care Decision Maker:     Primary Decision Maker: Mayur Cheema - Child - [phone]    Secondary Decision Maker: Faustina Galarza Other - [phone]  Click here to complete Healthcare Decision Makers including section of the Healthcare Decision Maker Relationship (ie \"Primary\")  Today we documented Decision Maker(s) consistent with Legal Next of Kin hierarchy. This is only true for her son. She only has 1 other child. However, she wanted her cousin Gita to be 2ndary and SW suggests she complete POA to reflect her and her son. Pt declines but accepted blank copy of POA and LW. She will review and possibly complete while in hospital.     Care Preferences    Ventilation: \"If you were in your present state of health and suddenly became very ill and were unable to breathe on your own, what would your preference be about the use of a ventilator (breathing machine) if it were available to you? \"      Would the patient desire the use of ventilator (breathing machine)?: yes    \"If your health worsens and it becomes clear that your chance of recovery is unlikely, what would your preference be about the use of a ventilator (breathing machine) if it were available to you? \"     Would the patient desire the use of ventilator (breathing machine)?: Yes      Resuscitation  \"CPR works best to restart the heart when there is a sudden event, like a heart attack, in someone who is otherwise healthy. Unfortunately, CPR does not typically restart the heart for people who have serious health conditions or who are very sick. \"    \"In the event your heart stopped as a result of an underlying serious health condition, would you want attempts to be made to restart your heart (answer \"yes\" for attempt to resuscitate) or would you prefer a natural death (answer \"no\" for do not attempt to resuscitate)? \" yes       [] Yes   [x] No   Educated Patient / Priya Lyons regarding differences between Advance Directives and portable DNR orders.     Length of ACP Conversation in minutes:  12    Conversation Outcomes:  [x] ACP discussion completed  [] Existing advance directive reviewed with patient; no changes to patient's previously recorded wishes  [] New Advance Directive completed  [] Portable Do Not Rescitate prepared for Provider review and signature  [] POLST/POST/MOLST/MOST prepared for Provider review and signature      Follow-up plan:    [] Schedule follow-up conversation to continue planning  [] Referred individual to Provider for additional questions/concerns   [] Advised patient/agent/surrogate to review completed ACP document and update if needed with changes in condition, patient preferences or care setting    [x] This note routed to one or more involved healthcare providers

## 2022-05-17 NOTE — CARE COORDINATION
SS Note: Covid test negative. Pt presented for SOB. Pt is 87% room air. 4l NC placed 91%. Pt identified as potential readmission, last here 4/21 - 4/26 for Sepsis. She also had UTI and is on IV Vanc. She is coming [facility]. SW met w/pt in ED 09 for transition of care planning. Spoke from door wearing mask/PPE and explained role. Pt is  & was living alone in a 9th floor apartment ([facility]) w/elevator but has been quite ill & has had multiple hospitalizations and need to be in a [place]. Thus she has not been in her apartment much last 6 months. Pt's son, [facility] [phone] is pt's primary decision maker. PCP is Alcides Ibarra is [facility] on [address]. Pt also uses [facility] [address] for her anti-rejection medication which is sent via [facility]. Pt has following DME: walker, Shower chair. Past hx of [facility] & [facility] w/[facility], [place] [employer] and [place]. SW completed ACP w/pt. Pt dx'd w/Chronic renal insufficiency, stage 3 & d/t her hx of clots, off of her Eliquis with increasing dyspnea and hypoxia & history of renal transplant CT is not most optimal. Pt is being readmitted for further evaluation and possible V/Q. US of her legs is neg & she has an IVC filter in. No alternatives to admission at this time. VERONIKA completed readmission review. SW did speak to [employer]. She notes no precert needed to return and will hold bed for pt.      Readmission Assessment  Number of Days since last admission?: 8-30 days  Previous Disposition: SNF  Who is being Interviewed: Patient  What was the patient's/caregiver's perception as to why they think they needed to return back to the hospital?: Other (Comment) (SOB- trouble breathing)  Did you visit your Primary Care Physician after you left the hospital, before you returned this time?: No  Why weren't you able to visit your PCP?: Did not have an appointment  Did you see a specialist, such as Cardiac, Pulmonary, Orthopedic Physician, etc. after you left the hospital?: No  Who advised the patient to return to the hospital?: Skilled Unit  Does the patient report anything that got in the way of taking their medications?: No  In our efforts to provide the best possible care to you and others like you, can you think of anything that we could have done to help you after you left the hospital the first time, so that you might not have needed to return so soon?: Other (Comment) (Nothing- Pt states she likes [address])  Electronically signed by AG Cosby on 5/17/2022 at 5:06 PM

## 2022-05-18 ENCOUNTER — APPOINTMENT (OUTPATIENT)
Dept: NUCLEAR MEDICINE | Age: 67
DRG: 191 | End: 2022-05-18
Payer: MEDICARE

## 2022-05-18 LAB
ALBUMIN SERPL-MCNC: 3.4 G/DL (ref 3.5–5.2)
ALP BLD-CCNC: 80 U/L (ref 35–104)
ALT SERPL-CCNC: 8 U/L (ref 0–32)
ANION GAP SERPL CALCULATED.3IONS-SCNC: 13 MMOL/L (ref 7–16)
ANISOCYTOSIS: ABNORMAL
ANISOCYTOSIS: ABNORMAL
APTT: 31.7 SEC (ref 24.5–35.1)
AST SERPL-CCNC: 13 U/L (ref 0–31)
BACTERIA: ABNORMAL /HPF
BASOPHILS ABSOLUTE: 0 E9/L (ref 0–0.2)
BASOPHILS ABSOLUTE: ABNORMAL E9/L (ref 0–0.2)
BASOPHILS RELATIVE PERCENT: 0.5 % (ref 0–2)
BASOPHILS RELATIVE PERCENT: ABNORMAL % (ref 0–2)
BILIRUB SERPL-MCNC: 0.5 MG/DL (ref 0–1.2)
BILIRUBIN URINE: NEGATIVE
BLOOD, URINE: NEGATIVE
BUN BLDV-MCNC: 22 MG/DL (ref 6–23)
CALCIUM SERPL-MCNC: 10.7 MG/DL (ref 8.6–10.2)
CHLORIDE BLD-SCNC: 102 MMOL/L (ref 98–107)
CLARITY: CLEAR
CO2: 29 MMOL/L (ref 22–29)
COLOR: ABNORMAL
CREAT SERPL-MCNC: 1.9 MG/DL (ref 0.5–1)
EOSINOPHILS ABSOLUTE: 0.04 E9/L (ref 0.05–0.5)
EOSINOPHILS ABSOLUTE: ABNORMAL E9/L (ref 0.05–0.5)
EOSINOPHILS RELATIVE PERCENT: 0.9 % (ref 0–6)
EOSINOPHILS RELATIVE PERCENT: ABNORMAL % (ref 0–6)
EPITHELIAL CELLS, UA: ABNORMAL /HPF
GFR AFRICAN AMERICAN: 32
GFR NON-AFRICAN AMERICAN: 26 ML/MIN/1.73
GLUCOSE BLD-MCNC: 110 MG/DL (ref 74–99)
GLUCOSE URINE: NEGATIVE MG/DL
HCT VFR BLD CALC: 37.5 % (ref 34–48)
HCT VFR BLD CALC: ABNORMAL % (ref 34–48)
HEMOGLOBIN: 10.8 G/DL (ref 11.5–15.5)
HEMOGLOBIN: ABNORMAL G/DL (ref 11.5–15.5)
IMMATURE GRANULOCYTES #: ABNORMAL E9/L
IMMATURE GRANULOCYTES %: ABNORMAL % (ref 0–5)
INR BLD: 1.1
KETONES, URINE: NEGATIVE MG/DL
LEUKOCYTE ESTERASE, URINE: NEGATIVE
LYMPHOCYTES ABSOLUTE: 0.66 E9/L (ref 1.5–4)
LYMPHOCYTES ABSOLUTE: ABNORMAL E9/L (ref 1.5–4)
LYMPHOCYTES RELATIVE PERCENT: 14.9 % (ref 20–42)
LYMPHOCYTES RELATIVE PERCENT: ABNORMAL % (ref 20–42)
MCH RBC QN AUTO: 32.3 PG (ref 26–35)
MCH RBC QN AUTO: ABNORMAL PG (ref 26–35)
MCHC RBC AUTO-ENTMCNC: 28.8 % (ref 32–34.5)
MCHC RBC AUTO-ENTMCNC: ABNORMAL % (ref 32–34.5)
MCV RBC AUTO: 112.3 FL (ref 80–99.9)
MCV RBC AUTO: ABNORMAL FL (ref 80–99.9)
MONOCYTES ABSOLUTE: 0.13 E9/L (ref 0.1–0.95)
MONOCYTES ABSOLUTE: ABNORMAL E9/L (ref 0.1–0.95)
MONOCYTES RELATIVE PERCENT: 2.6 % (ref 2–12)
MONOCYTES RELATIVE PERCENT: ABNORMAL % (ref 2–12)
NEUTROPHILS ABSOLUTE: 3.61 E9/L (ref 1.8–7.3)
NEUTROPHILS ABSOLUTE: ABNORMAL E9/L (ref 1.8–7.3)
NEUTROPHILS RELATIVE PERCENT: 81.6 % (ref 43–80)
NEUTROPHILS RELATIVE PERCENT: ABNORMAL % (ref 43–80)
NITRITE, URINE: NEGATIVE
OVALOCYTES: ABNORMAL
OVALOCYTES: ABNORMAL
PDW BLD-RTO: 16.8 FL (ref 11.5–15)
PDW BLD-RTO: ABNORMAL FL (ref 11.5–15)
PH UA: 5.5 (ref 5–9)
PLATELET # BLD: 172 E9/L (ref 130–450)
PLATELET # BLD: ABNORMAL E9/L (ref 130–450)
PMV BLD AUTO: 11.8 FL (ref 7–12)
PMV BLD AUTO: ABNORMAL FL (ref 7–12)
POIKILOCYTES: ABNORMAL
POIKILOCYTES: ABNORMAL
POLYCHROMASIA: ABNORMAL
POTASSIUM SERPL-SCNC: 4.2 MMOL/L (ref 3.5–5)
PROTEIN UA: NEGATIVE MG/DL
PROTHROMBIN TIME: 13.1 SEC (ref 9.3–12.4)
RBC # BLD: 3.34 E12/L (ref 3.5–5.5)
RBC # BLD: ABNORMAL E12/L (ref 3.5–5.5)
RBC UA: ABNORMAL /HPF (ref 0–2)
SODIUM BLD-SCNC: 144 MMOL/L (ref 132–146)
SPECIFIC GRAVITY UA: 1.01 (ref 1–1.03)
TOTAL PROTEIN: 6.4 G/DL (ref 6.4–8.3)
UROBILINOGEN, URINE: 0.2 E.U./DL
WBC # BLD: 4.4 E9/L (ref 4.5–11.5)
WBC # BLD: ABNORMAL E9/L (ref 4.5–11.5)
WBC UA: ABNORMAL /HPF (ref 0–5)

## 2022-05-18 PROCEDURE — 81001 URINALYSIS AUTO W/SCOPE: CPT

## 2022-05-18 PROCEDURE — 87088 URINE BACTERIA CULTURE: CPT

## 2022-05-18 PROCEDURE — 85025 COMPLETE CBC W/AUTO DIFF WBC: CPT

## 2022-05-18 PROCEDURE — 6360000002 HC RX W HCPCS: Performed by: INTERNAL MEDICINE

## 2022-05-18 PROCEDURE — G0378 HOSPITAL OBSERVATION PER HR: HCPCS

## 2022-05-18 PROCEDURE — 6370000000 HC RX 637 (ALT 250 FOR IP): Performed by: INTERNAL MEDICINE

## 2022-05-18 PROCEDURE — 78580 LUNG PERFUSION IMAGING: CPT

## 2022-05-18 PROCEDURE — 3430000000 HC RX DIAGNOSTIC RADIOPHARMACEUTICAL: Performed by: RADIOLOGY

## 2022-05-18 PROCEDURE — 51702 INSERT TEMP BLADDER CATH: CPT

## 2022-05-18 PROCEDURE — 78582 LUNG VENTILAT&PERFUS IMAGING: CPT

## 2022-05-18 PROCEDURE — 36415 COLL VENOUS BLD VENIPUNCTURE: CPT

## 2022-05-18 PROCEDURE — 97161 PT EVAL LOW COMPLEX 20 MIN: CPT

## 2022-05-18 PROCEDURE — 51798 US URINE CAPACITY MEASURE: CPT

## 2022-05-18 PROCEDURE — 85610 PROTHROMBIN TIME: CPT

## 2022-05-18 PROCEDURE — 94640 AIRWAY INHALATION TREATMENT: CPT

## 2022-05-18 PROCEDURE — 80053 COMPREHEN METABOLIC PANEL: CPT

## 2022-05-18 PROCEDURE — 85730 THROMBOPLASTIN TIME PARTIAL: CPT

## 2022-05-18 PROCEDURE — 2700000000 HC OXYGEN THERAPY PER DAY

## 2022-05-18 PROCEDURE — A9540 TC99M MAA: HCPCS | Performed by: RADIOLOGY

## 2022-05-18 PROCEDURE — A9539 TC99M PENTETATE: HCPCS | Performed by: RADIOLOGY

## 2022-05-18 PROCEDURE — 97165 OT EVAL LOW COMPLEX 30 MIN: CPT

## 2022-05-18 RX ORDER — KIT FOR THE PREPARATION OF TECHNETIUM TC 99M PENTETATE 20 MG/1
30 INJECTION, POWDER, LYOPHILIZED, FOR SOLUTION INTRAVENOUS; RESPIRATORY (INHALATION)
Status: COMPLETED | OUTPATIENT
Start: 2022-05-18 | End: 2022-05-18

## 2022-05-18 RX ORDER — IPRATROPIUM BROMIDE AND ALBUTEROL SULFATE 2.5; .5 MG/3ML; MG/3ML
1 SOLUTION RESPIRATORY (INHALATION) 4 TIMES DAILY
Status: DISCONTINUED | OUTPATIENT
Start: 2022-05-18 | End: 2022-05-24 | Stop reason: HOSPADM

## 2022-05-18 RX ADMIN — BUDESONIDE 500 MCG: 0.5 INHALANT RESPIRATORY (INHALATION) at 18:30

## 2022-05-18 RX ADMIN — ANTI-FUNGAL POWDER MICONAZOLE NITRATE TALC FREE: 1.42 POWDER TOPICAL at 10:12

## 2022-05-18 RX ADMIN — ALBUTEROL SULFATE 2.5 MG: 2.5 SOLUTION RESPIRATORY (INHALATION) at 06:05

## 2022-05-18 RX ADMIN — IPRATROPIUM BROMIDE AND ALBUTEROL SULFATE 1 AMPULE: .5; 2.5 SOLUTION RESPIRATORY (INHALATION) at 14:21

## 2022-05-18 RX ADMIN — APIXABAN 5 MG: 5 TABLET, FILM COATED ORAL at 20:56

## 2022-05-18 RX ADMIN — MYCOPHENOLATE MOFETIL 1000 MG: 250 CAPSULE ORAL at 20:56

## 2022-05-18 RX ADMIN — SODIUM BICARBONATE 650 MG: 650 TABLET ORAL at 10:11

## 2022-05-18 RX ADMIN — GUAIFENESIN 600 MG: 600 TABLET, EXTENDED RELEASE ORAL at 20:58

## 2022-05-18 RX ADMIN — METOPROLOL TARTRATE 12.5 MG: 25 TABLET, FILM COATED ORAL at 10:10

## 2022-05-18 RX ADMIN — CYCLOSPORINE 100 MG: 25 CAPSULE, GELATIN COATED ORAL at 20:55

## 2022-05-18 RX ADMIN — ANTI-FUNGAL POWDER MICONAZOLE NITRATE TALC FREE: 1.42 POWDER TOPICAL at 20:54

## 2022-05-18 RX ADMIN — Medication 1000 UNITS: at 10:11

## 2022-05-18 RX ADMIN — ACYCLOVIR 400 MG: 400 TABLET ORAL at 20:58

## 2022-05-18 RX ADMIN — LEVOTHYROXINE SODIUM 50 MCG: 0.05 TABLET ORAL at 10:12

## 2022-05-18 RX ADMIN — APIXABAN 5 MG: 5 TABLET, FILM COATED ORAL at 10:11

## 2022-05-18 RX ADMIN — MAGNESIUM OXIDE 400 MG: 400 TABLET ORAL at 10:11

## 2022-05-18 RX ADMIN — PREDNISONE 5 MG: 5 TABLET ORAL at 10:11

## 2022-05-18 RX ADMIN — BUDESONIDE 500 MCG: 0.5 INHALANT RESPIRATORY (INHALATION) at 06:05

## 2022-05-18 RX ADMIN — GUAIFENESIN 600 MG: 600 TABLET, EXTENDED RELEASE ORAL at 10:12

## 2022-05-18 RX ADMIN — CYCLOSPORINE 100 MG: 25 CAPSULE, GELATIN COATED ORAL at 10:10

## 2022-05-18 RX ADMIN — CLOZAPINE 50 MG: 25 TABLET ORAL at 20:57

## 2022-05-18 RX ADMIN — IPRATROPIUM BROMIDE AND ALBUTEROL SULFATE 1 AMPULE: .5; 2.5 SOLUTION RESPIRATORY (INHALATION) at 18:30

## 2022-05-18 RX ADMIN — MYCOPHENOLATE MOFETIL 1000 MG: 250 CAPSULE ORAL at 10:11

## 2022-05-18 RX ADMIN — KIT FOR THE PREPARATION OF TECHNETIUM TC 99M PENTETATE 30 MILLICURIE: 20 INJECTION, POWDER, LYOPHILIZED, FOR SOLUTION INTRAVENOUS; RESPIRATORY (INHALATION) at 08:33

## 2022-05-18 RX ADMIN — PANTOPRAZOLE SODIUM 40 MG: 40 TABLET, DELAYED RELEASE ORAL at 05:42

## 2022-05-18 RX ADMIN — DESVENLAFAXINE 50 MG: 50 TABLET, EXTENDED RELEASE ORAL at 20:57

## 2022-05-18 RX ADMIN — ALLOPURINOL 100 MG: 100 TABLET ORAL at 10:11

## 2022-05-18 RX ADMIN — FOLIC ACID 1 MG: 1 TABLET ORAL at 10:12

## 2022-05-18 RX ADMIN — Medication 125 MG: at 15:32

## 2022-05-18 RX ADMIN — Medication 125 MG: at 10:18

## 2022-05-18 RX ADMIN — METOPROLOL TARTRATE 12.5 MG: 25 TABLET, FILM COATED ORAL at 20:55

## 2022-05-18 RX ADMIN — Medication 6 MILLICURIE: at 08:34

## 2022-05-18 RX ADMIN — ATORVASTATIN CALCIUM 10 MG: 10 TABLET, FILM COATED ORAL at 20:56

## 2022-05-18 RX ADMIN — ACYCLOVIR 400 MG: 400 TABLET ORAL at 10:11

## 2022-05-18 RX ADMIN — Medication 125 MG: at 20:55

## 2022-05-18 ASSESSMENT — PAIN SCALES - GENERAL
PAINLEVEL_OUTOF10: 0
PAINLEVEL_OUTOF10: 0

## 2022-05-18 NOTE — PROGRESS NOTES
Physical Therapy Initial Evaluation/Plan of Care    Room #:  0371/7699-55  Patient Name: Mavis Akers  YOB: 1955  MRN: [id number]    Date of Service: 5/18/2022     Tentative placement recommendation: Subacute rehab  Equipment recommendation:  To be determined      Evaluating Physical Therapist: Dayna Juárez #[id number]      Specific Provider Orders/Date/Referring Provider :   05/18/22 0900   PT eval and treat Start: 05/18/22 0900, End: 05/18/22 0900, ONE TIME, Standing Count: 1 Occurrences, Kelly Shafer MD      Admitting Diagnosis:   Hypoxia [R09.02]  Chronic renal insufficiency, stage 3 (moderate) ([address] [N18.30]  Dyspnea, unspecified type [R06.00]      Surgery: none  Visit Diagnoses       Codes    Dyspnea, unspecified type    -  Primary R06.00    Chronic renal insufficiency, stage 3 (moderate) ([facility])     N18.30          Patient Active Problem List   Diagnosis    Peripheral vascular disease ([address]    Depression    Clotting disorder ([address]    Abnormal EKG    Cancer ([address]    Over weight    Kidney transplanted    ESRD (end stage renal disease) ([address]    Non morbid obesity due to excess calories    Hypertension    Leg swelling    Lymphedema of both lower extremities    Acute gout involving toe of right foot    Sepsis secondary to UTI ([address]    Acute kidney injury superimposed on CKD ([address]    Thyroid disease    Acute on chronic combined systolic (congestive) and diastolic (congestive) heart failure ([address]    Sepsis ([address]    Acute renal failure ([address]    CECILY (acute kidney injury) ([address]    Ischemic bowel disease ([address]    Altered mental status    Coma ([address]    Acute respiratory failure with hypoxia ([address]    Hypoxia    Acute metabolic encephalopathy    Acute deep vein thrombosis (DVT) of femoral vein of left lower extremity (HCC)    Anemia    Positive blood culture    Difficult intravenous access    Paroxysmal atrial fibrillation (HCC)    Hypotension    LBBB (left bundle branch block)    History of DVT (deep vein thrombosis)    Chronic anticoagulation    Acute respiratory failure with hypoxemia (HCC)        ASSESSMENT of Current Deficits Patient exhibits decreased strength, balance and endurance impairing functional mobility, transfers and gait . Patient lethargic at first but able to participate with therapy more once sitting up on EOB. The patient will benefit from continued skilled therapy to increase strength and improve balance for safe functional mobility, to decrease risk of falls, and to meet goals at discharge. PHYSICAL THERAPY  PLAN OF CARE       Physical therapy plan of care is established based on physician order,  patient diagnosis and clinical assessment    Current Treatment Recommendations:    -Bed Mobility: Lower extremity exercises   -Sitting Balance: Incorporate reaching activities to activate trunk muscles   -Standing Balance: Perform strengthening exercises in standing to promote motor control with or without upper extremity support   -Transfers: Provide instruction on proper hand and foot position for adequate transfer of weight onto lower extremities and use of gait device if needed and Cues for hand placement, technique and safety. Provide stabilization to prevent fall   -Gait: Gait training and Standing activities to improve: base of support, weight shift, weight bearing    -Endurance: Utilize Supervised activities to increase level of endurance to allow for safe functional mobility including transfers and gait     PT long term treatment goals are located in below grid    Patient and or family understand(s) diagnosis, prognosis, and plan of care. Frequency of treatments: Patient will be seen  daily.          Prior Level of Function: Patient ambulated with wheeled walker with assist at nursing home  Rehab Potential: good  for baseline    Past medical history:   Past Medical History:   Diagnosis Date    Abnormal EKG     left bundle branch block    Acute gout involving toe of right foot 09/03/2020    Acute on chronic combined systolic (congestive) and diastolic (congestive) heart failure (HCC)     Cancer (HCC)     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    Cerebrovascular disease     Clotting disorder ([facility]    thrombophlebitis after c section delivery    Depression     15 years followed by dr Jefrey Bence Hemodialysis patient [facility])     History of blood transfusion     Hyperlipidemia     Hypertension     Hypothyroidism     Leg swelling 09/03/2020    Lymphedema of both lower extremities 09/03/2020    Peripheral vascular disease ([facility]     Renal failure 11/2012    renal transplant    Thrombophlebitis     after c section delivery    Thyroid disease      Past Surgical History:   Procedure Laterality Date   [address]    one normal delivery    COLECTOMY N/A 1/15/2022    DIAGNOSTIC  LAPAROSCOPY LYSIS OF ADHESIONS performed by Matrina Malloy MD at [address] [place] [address] Drive CATH LAB PROCEDURE  2006    normal coronaries and 1996 normal coronaries    DIALYSIS FISTULA CREATION  march and july 2012    phase one and two patient will start dialysis when needed   [address]    transfemoral venous bypass grafts    IR IVC FILTER PLACEMENT W IMAGING  2/26/2022    IR IVC FILTER PLACEMENT W IMAGING 2/26/2022 [facility] SPECIAL PROCEDURES    KIDNEY TRANSPLANT  2016    KIDNEY TRANSPLANT  2015    KIDNEY TRANSPLANT  2015    PARATHYROIDECTOMY  2006    left parathyroid  implant and parathyroidectomy    TRANSESOPHAGEAL ECHOCARDIOGRAM N/A 2/28/2022    TRANSESOPHAGEAL ECHOCARDIOGRAM WITH BUBBLE STUDY performed by Bruna Lopez MD at [address] [place] [address]:    Precautions:, falls and contact isolation , -c diff, neutropenic precautions   Social history: Pt has had multiple hospitalizations and rehab stays over the last 6 months.  Has been requiring assist with ADLs and ambulating with assist using wheeled walker at rehab. Equipment owned: Yulissa Beaver,      [address] [place] [address],[address] Mobility Inpatient   How much difficulty turning over in bed?: A Little  How much difficulty sitting down on / standing up from a chair with arms?: A Little  How much difficulty moving from lying on back to sitting on side of bed?: A Little  How much help from another person moving to and from a bed to a chair?: A Little  How much help from another person needed to walk in hospital room?: A Lot  How much help from another person for climbing 3-5 steps with a railing?: A Lot  AM-PAC Inpatient Mobility Raw Score : 16  AM-PAC Inpatient T-Scale Score : 40.78  Mobility Inpatient CMS 0-100% Score: 54.16  Mobility Inpatient CMS G-Code Modifier : CK    Nursing cleared patient for PT evaluation. The admitting diagnosis and active problem list as listed above have been reviewed prior to the initiation of this evaluation. OBJECTIVE;   Initial Evaluation  Date: 5/18/2022 Treatment Date:     Short Term/ Long Term   Goals   Was pt agreeable to Eval/treatment? Yes  To be met in 4 days   Pain level   0/10  just fatigued per patient      Bed Mobility    Rolling: Minimal assist of 1    Supine to sit: Minimal assist of 1    Sit to supine: Minimal assist of 1    Scooting: Minimal assist of 1    Rolling: Independent    Supine to sit:  Independent    Sit to supine: Independent    Scooting: Independent     Transfers Sit to stand: Minimal assist of 1 cues for hand and foot placement   Sit to stand: Independent    Ambulation    5 feet using  wheeled walker with Moderate assist of 1   for walker control, balance, upright and safety   35 feet using  wheeled walker with Supervision     ROM Within functional limits    Increase range of motion 10% of affected joints    Strength BUE:  refer to OT eval  RLE:  4-/5  LLE:  4-/5  Increase strength in affected mm groups by 1/3 grade   Balance Sitting EOB:  good -  Dynamic Standing: fair wheeled walker    Sitting EOB:  good   Dynamic Standing: good      Patient is Alert & Oriented x person and time and follows directions    Sensation:  Patient  denies numbness/tingling   Edema:  no   Endurance: fair     Vitals: 2 liters nasal cannula   Blood Pressure at rest  Blood Pressure during session    Heart Rate at rest 86 Heart Rate during session    SPO2 at rest 93%  SPO2 during session 90-91%     Patient education  Patient educated on role of Physical Therapy, risks of immobility, safety and plan of care,  importance of mobility while in hospital , ankle pumps, quad set and glut set for edema control, blood clot prevention, importance and purpose of adaptive device and adjusted to proper height for the patient. and safety      Patient response to education:   Pt verbalized understanding Pt demonstrated skill Pt requires further education in this area   Yes Partial Yes      Treatment:  Patient practiced and was instructed/facilitated in the following treatment: Patient assisted to EOB. Sat edge of bed 5 minutes with Minimal assist of 1 to increase dynamic sitting balance and activity tolerance. Pt stood and took side steps to [facility]. Assisted back to supine. Therapeutic Exercises:  not performed  x  reps. At end of session, patient in bed with alarm call light and phone within reach,  all lines and tubes intact, nursing notified. Patient would benefit from continued skilled Physical Therapy to improve functional independence and quality of life. Patient's/ family goals   rehab    Time in  1346  Time out  1406    Total Treatment Time  0 minutes    Evaluation time includes thorough review of current medical information, gathering information on past medical history/social history and prior level of function, completion of standardized testing/informal observation of tasks, assessment of data, and development of Plan of care and goals.      CPT codes:  Low Complexity PT evaluation (78189)  No treatment    Margret Wright, PT

## 2022-05-18 NOTE — CONSULTS
Pulmonary/Critical Care Consult Note    CHIEF COMPLAINT: Hypoxemia    ISSUES:    Chronic Hypoxemic respiratory failure  Seems to be close to baseline  Normal CXR, dopplers and V/Q  No evidence of CHF    Unclear if any new process going on    COPD  CHF  History of   No clear exacerbation      No new pulmonary findings of concern  Would re-evaluate O2 need     OK for transfer back to SNF if no other issues      __________________________________________________    Referring MD: Dr. Cross Duevanessa    Reason for Consult: Hypoxemia      HISTORY OF PRESENT ILLNESS:   Serena Washington is a 78 y.o. female with hx of CHF, COPD, CVA, s/p Renal transplant, PVD, DVT (on eliquis) s/p IVC filter placement ,  HTN, Bipolar disorder presents with increased SOB and hypoxemia    Sent from nursing home with recent desatruation  Pt denies any new cough or sputum  Has baseline SOB that is mildly worse (?)  No Fever, Chills or Sweats     In ER  Dopplers negative   Had V/Q scan done - very low probability    ALLERGY:  Macrodantin [nitrofurantoin macrocrystal] and Sulfa antibiotics    FAMILY HISTORY:  Family History   Problem Relation Age of Onset    Diabetes Mother     Diabetes Father     Dementia Father        SOCIAL HISTORY:  Social History     Socioeconomic History    Marital status:      Spouse name: Not on file    Number of children: Not on file    Years of education: Not on file    Highest education level: Not on file   Occupational History    Not on file   Tobacco Use    Smoking status: Former Smoker     Packs/day: 1.00     Years: 20.00     Pack years: 20.00     Quit date: 10/1/2015     Years since quittin.6    Smokeless tobacco: Never Used   Vaping Use    Vaping Use: Never used   Substance and Sexual Activity    Alcohol use: No     Comment: 2 cups coffee per day    Drug use: No    Sexual activity: Not on file   Other Topics Concern    Not on file   Social History Narrative    Not on file     Social Determinants of Health     Financial Resource Strain:     Difficulty of Paying Living Expenses: Not on file   Food Insecurity:     Worried About Running Out of Food in the Last Year: Not on file    Vannessa of Food in the Last Year: Not on file   Transportation Needs:     Lack of Transportation (Medical): Not on file    Lack of Transportation (Non-Medical):  Not on file   Physical Activity:     Days of Exercise per Week: Not on file    Minutes of Exercise per Session: Not on file   Stress:     Feeling of Stress : Not on file   Social Connections:     Frequency of Communication with Friends and Family: Not on file    Frequency of Social Gatherings with Friends and Family: Not on file    Attends Taoist Services: Not on file    Active Member of [address] [place] [address] [employer] or Organizations: Not on file    Attends Club or Organization Meetings: Not on file    Marital Status: Not on file   Intimate Partner Violence:     Fear of Current or Ex-Partner: Not on file    Emotionally Abused: Not on file    Physically Abused: Not on file    Sexually Abused: Not on file   Housing Stability:     Unable to Pay for Housing in the Last Year: Not on file    Number of [place] in the Last Year: Not on file    Unstable Housing in the Last Year: Not on file       MEDICAL HISTORY:  Past Medical History:   Diagnosis Date    Abnormal EKG     left bundle branch block    Acute gout involving toe of right foot 09/03/2020    Acute on chronic combined systolic (congestive) and diastolic (congestive) heart failure ([address]     Cancer ([address]     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    Cerebrovascular disease     Clotting disorder ([facility] 1985    thrombophlebitis after c section delivery    Depression     15 years followed by dr Kelly Torres Hemodialysis patient [facility])     History of blood transfusion     Hyperlipidemia     Hypertension     Hypothyroidism     Leg swelling 09/03/2020    Lymphedema of both lower extremities 09/03/2020    Peripheral vascular disease [facility]     Renal failure 11/2012    renal transplant    Thrombophlebitis     after c section delivery    Thyroid disease        MEDICATIONS:   ipratropium-albuterol  1 ampule Inhalation 4x Daily    acyclovir  400 mg Oral BID    allopurinol  100 mg Oral Daily    atorvastatin  10 mg Oral Nightly    budesonide  500 mcg Nebulization BID    cloZAPine  50 mg Oral Nightly    cycloSPORINE  100 mg Oral BID    desvenlafaxine succinate  50 mg Oral QPM    folic acid  1 mg Oral Daily    guaiFENesin  600 mg Oral BID    levothyroxine  50 mcg Oral Daily    magnesium oxide  400 mg Oral Daily    metoprolol tartrate  12.5 mg Oral BID    miconazole   Topical BID    pantoprazole  40 mg Oral QAM AC    predniSONE  5 mg Oral Daily    sodium bicarbonate  650 mg Oral Daily    vancomycin  125 mg Oral TID    Vitamin D  1,000 Units Oral Daily    apixaban  5 mg Oral BID    mycophenolate  1,000 mg Oral BID       acetaminophen, diphenhydrAMINE, midodrine, simethicone    REVIEW OF SYSTEMS:  Constitutional: Denies fever, weight loss, night sweats, and fatigue  Skin: Denies pigmentation, dark lesions, and rashes   HEENT: Denies hearing loss, tinnitus, ear drainage, epistaxis, sore throat, and hoarseness. Cardiovascular: Denies palpitations, chest pain, and chest pressure. Respiratory: Denies cough, dyspnea at rest, hemoptysis, apnea, and choking.   Gastrointestinal: Denies nausea, vomiting, poor appetite, diarrhea, heartburn or reflux  Genitourinary: Denies dysuria, frequency, urgency or hematuria  Musculoskeletal: Denies myalgias, muscle weakness, and bone pain  Neurological: Denies dizziness, vertigo, headache, and focal weakness  Psychological: Denies anxiety and depression  Endocrine: Denies heat intolerance and cold intolerance  Hematopoietic/Lymphatic: Denies bleeding problems and blood transfusions    PHYSICAL EXAM:  Vitals:    05/18/22 0730   BP: (!) 116/97   Pulse: 86   Resp: 19   Temp: 97.9 °F (36.6 °C)   SpO2: 96%        O2 Flow Rate (L/min): 2 L/min  O2 Device: Nasal cannula    General Appearance: alert and oriented to person, place and time, in no acute distress  Cardiovascular: normal rate, regular rhythm, normal S1 and S2, no murmurs, rubs, clicks, or gallops, distal pulses intact, no carotid bruits, no JVD  Pulmonary/Chest: clear to auscultation bilaterally- no wheezes, rales or rhonchi, normal air movement, no respiratory distress  Abdomen: soft, non-tender, non-distended, normal bowel sounds, no masses   Extremities: no cyanosis, clubbing or edema  Skin: warm and dry, no rash or erythema  Head: normocephalic and atraumatic  Eyes: pupils equal, round, and reactive to light  Neck: supple and non-tender without mass, no thyromegaly   Musculoskeletal: normal range of motion, no joint swelling, deformity or tenderness  Neurological: alert, oriented, normal speech, no focal findings or movement disorder noted    LABS:  WBC   Date Value Ref Range Status   05/18/2022 4.4 (L) 4.5 - 11.5 E9/L Final   05/17/2022 SEE BELOW (AA) 4.5 - 11.5 E9/L Corrected     Comment:     DUPLICATE ORDER.   Corrected result; previously reported as 6.4 on 05/17/2022 at 13:25 by [facility]     05/17/2022 6.6 4.5 - 11.5 E9/L Final     Hemoglobin   Date Value Ref Range Status   05/18/2022 10.8 (L) 11.5 - 15.5 g/dL Final   05/17/2022 SEE BELOW (AA) 11.5 - 15.5 g/dL Corrected     Comment:     Corrected result; previously reported as 10.4 on 05/17/2022 at 13:25 by [facility]   05/17/2022 10.4 (L) 11.5 - 15.5 g/dL Final     Hematocrit   Date Value Ref Range Status   05/18/2022 37.5 34.0 - 48.0 % Final   05/17/2022 SEE BELOW (AA) 34.0 - 48.0 % Corrected     Comment:     Corrected result; previously reported as 35.0 on 05/17/2022 at 13:25 by [facility]   05/17/2022 35.1 34.0 - 48.0 % Final     MCV   Date Value Ref Range Status   05/18/2022 112.3 (H) 80.0 - 99.9 fL Final   05/17/2022 SEE BELOW (AA) 80.0 - 99.9 fL Corrected     Comment: Corrected result; previously reported as 113.3 on 05/17/2022 at 13:25 by [facility]   05/17/2022 111.4 (H) 80.0 - 99.9 fL Final     Platelets   Date Value Ref Range Status   05/18/2022 172 130 - 450 E9/L Final   05/17/2022 SEE BELOW (AA) 130 - 450 E9/L Corrected     Comment:     Corrected result; previously reported as 172 on 05/17/2022 at 13:25 by [facility]   05/17/2022 174 130 - 450 E9/L Final     Sodium   Date Value Ref Range Status   05/18/2022 144 132 - 146 mmol/L Final   05/17/2022 140 132 - 146 mmol/L Final   04/26/2022 141 132 - 146 mmol/L Final     Potassium   Date Value Ref Range Status   05/18/2022 4.2 3.5 - 5.0 mmol/L Final   05/17/2022 4.8 3.5 - 5.0 mmol/L Final     Comment:     Specimen is moderately Hemolyzed. Result may be artificially increased. 04/26/2022 4.1 3.5 - 5.0 mmol/L Final     Potassium reflex Magnesium   Date Value Ref Range Status   04/21/2022 4.3 3.5 - 5.0 mmol/L Final   03/09/2022 4.7 3.5 - 5.0 mmol/L Final   02/26/2022 4.7 3.5 - 5.0 mmol/L Final     Comment:     Specimen is moderately Hemolyzed. Result may be artificially increased.      Chloride   Date Value Ref Range Status   05/18/2022 102 98 - 107 mmol/L Final   05/17/2022 98 98 - 107 mmol/L Final   04/26/2022 107 98 - 107 mmol/L Final     CO2   Date Value Ref Range Status   05/18/2022 29 22 - 29 mmol/L Final   05/17/2022 31 (H) 22 - 29 mmol/L Final   04/26/2022 26 22 - 29 mmol/L Final     BUN   Date Value Ref Range Status   05/18/2022 22 6 - 23 mg/dL Final   05/17/2022 24 (H) 6 - 23 mg/dL Final   04/26/2022 9 6 - 23 mg/dL Final     CREATININE   Date Value Ref Range Status   05/18/2022 1.9 (H) 0.5 - 1.0 mg/dL Final   05/17/2022 2.0 (H) 0.5 - 1.0 mg/dL Final   04/26/2022 1.0 0.5 - 1.0 mg/dL Final     Glucose   Date Value Ref Range Status   05/18/2022 110 (H) 74 - 99 mg/dL Final   05/17/2022 160 (H) 74 - 99 mg/dL Final   04/26/2022 90 74 - 99 mg/dL Final     Calcium   Date Value Ref Range Status   05/18/2022 10.7 (H) 8.6 - 10.2 mg/dL Final   05/17/2022 10.4 (H) 8.6 - 10.2 mg/dL Final   04/26/2022 9.4 8.6 - 10.2 mg/dL Final     Total Protein   Date Value Ref Range Status   05/18/2022 6.4 6.4 - 8.3 g/dL Final   05/17/2022 6.6 6.4 - 8.3 g/dL Final   04/26/2022 5.1 (L) 6.4 - 8.3 g/dL Final     Albumin   Date Value Ref Range Status   05/18/2022 3.4 (L) 3.5 - 5.2 g/dL Final   05/17/2022 3.5 3.5 - 5.2 g/dL Final   04/26/2022 3.0 (L) 3.5 - 5.2 g/dL Final     Total Bilirubin   Date Value Ref Range Status   05/18/2022 0.5 0.0 - 1.2 mg/dL Final   05/17/2022 0.5 0.0 - 1.2 mg/dL Final   04/26/2022 0.3 0.0 - 1.2 mg/dL Final     Alkaline Phosphatase   Date Value Ref Range Status   05/18/2022 80 35 - 104 U/L Final   05/17/2022 85 35 - 104 U/L Final   04/26/2022 46 35 - 104 U/L Final     AST   Date Value Ref Range Status   05/18/2022 13 0 - 31 U/L Final   05/17/2022 20 0 - 31 U/L Final     Comment:     Specimen is moderately Hemolyzed. Result may be artificially increased. 04/26/2022 8 0 - 31 U/L Final     ALT   Date Value Ref Range Status   05/18/2022 8 0 - 32 U/L Final   05/17/2022 11 0 - 32 U/L Final   04/26/2022 6 0 - 32 U/L Final     GFR Non-   Date Value Ref Range Status   05/18/2022 26 >=60 mL/min/1.73 Final     Comment:     Chronic Kidney Disease: less than 60 ml/min/1.73 sq.m. Kidney Failure: less than 15 ml/min/1.73 sq.m. Results valid for patients 18 years and older. 05/17/2022 25 >=60 mL/min/1.73 Final     Comment:     Chronic Kidney Disease: less than 60 ml/min/1.73 sq.m. Kidney Failure: less than 15 ml/min/1.73 sq.m. Results valid for patients 18 years and older. 04/26/2022 55 >=60 mL/min/1.73 Final     Comment:     Chronic Kidney Disease: less than 60 ml/min/1.73 sq.m. Kidney Failure: less than 15 ml/min/1.73 sq.m. Results valid for patients 18 years and older.        GFR    Date Value Ref Range Status   05/18/2022 32  Final   05/17/2022 30  Final   04/26/2022 >60  Final Magnesium   Date Value Ref Range Status   05/17/2022 1.9 1.6 - 2.6 mg/dL Final   04/26/2022 2.1 1.6 - 2.6 mg/dL Final   04/25/2022 1.3 (L) 1.6 - 2.6 mg/dL Final     Phosphorus   Date Value Ref Range Status   03/16/2022 3.0 2.5 - 4.5 mg/dL Final   03/15/2022 3.4 2.5 - 4.5 mg/dL Final   03/13/2022 4.6 (H) 2.5 - 4.5 mg/dL Final     No results for input(s): PH, PO2, PCO2, HCO3, BE, O2SAT in the last 72 hours. RADIOLOGY:  NM LUNG VENT/PERFUSION (VQ)   Final Result   Very low probability for pulmonary embolism. US DUP LOWER EXTREMITIES BILATERAL VENOUS   Final Result   No evidence of DVT in either lower extremity. XR CHEST PORTABLE   Final Result   1. Cardiomegaly. There are no findings of failure or pneumonia.            (Independently reviewed by me)        Latia Grider M.D  Pulmonary & Critical Care  5/18/2022 2:13 PM

## 2022-05-18 NOTE — PLAN OF CARE
Problem: Skin/Tissue Integrity  Goal: Absence of new skin breakdown  Description: 1. Monitor for areas of redness and/or skin breakdown  2. Assess vascular access sites hourly  3. Every 4-6 hours minimum:  Change oxygen saturation probe site  4. Every 4-6 hours:  If on nasal continuous positive airway pressure, respiratory therapy assess nares and determine need for appliance change or resting period.   Outcome: Progressing     Problem: ABCDS Injury Assessment  Goal: Absence of physical injury  Outcome: Progressing     Problem: Pain  Goal: Verbalizes/displays adequate comfort level or baseline comfort level  Outcome: Progressing  Flowsheets (Taken 5/17/2022 1815 by Rodney Smith RN)  Verbalizes/displays adequate comfort level or baseline comfort level: Encourage patient to monitor pain and request assistance

## 2022-05-18 NOTE — DISCHARGE INSTR - COC
Continuity of Care Form    Patient Name: Marvie Bumpers   :  1955  MRN:  [id number]    Admit date:  2022  Discharge date:  ***    Code Status Order: Prior   Advance Directives:      Admitting Physician:  Nicole Neil MD  PCP: Nicole Neil MD    Discharging Nurse: [facility] Unit/Room#: 7292/7036-58  Discharging Unit Phone Number: ***    Emergency Contact:   Extended Emergency Contact Information  Primary Emergency Contact: Mayur Cheema   [address] Phone: [phone]  Mobile Phone: [phone]  Relation: Child   needed? No  Secondary Emergency Contact: Jorge Luis Rogers  Mobile Phone: [phone]  Relation: Other  Preferred language: English   needed?  No    Past Surgical History:  Past Surgical History:   Procedure Laterality Date     SECTION  1985    one normal delivery    COLECTOMY N/A 1/15/2022    DIAGNOSTIC  LAPAROSCOPY LYSIS OF ADHESIONS performed by Danelle Shelton MD at [facility] CATH LAB PROCEDURE      normal coronaries and  normal coronaries    DIALYSIS FISTULA CREATION  march and 2012    phase one and two patient will start dialysis when needed    [facility] [address]    transfemoral venous bypass grafts    IR IVC FILTER PLACEMENT W IMAGING  2022    IR IVC FILTER PLACEMENT W IMAGING 2022 [facility] SPECIAL PROCEDURES    KIDNEY TRANSPLANT  2016    KIDNEY TRANSPLANT  2015    KIDNEY TRANSPLANT  2015    PARATHYROIDECTOMY  2006    left parathyroid  implant and parathyroidectomy    TRANSESOPHAGEAL ECHOCARDIOGRAM N/A 2022    TRANSESOPHAGEAL ECHOCARDIOGRAM WITH BUBBLE STUDY performed by Agnes Yin MD at [facility] ENDOSCOPY       Immunization History:   Immunization History   Administered Date(s) Administered    COVID-19, Pfizer Purple top, DILUTE for use, 12+ yrs, 30mcg/0.3mL dose 2021, 2021, 2021       Active Problems:  Patient Active Problem List Diagnosis Code    Peripheral vascular disease ([facility] I73.9    Depression F32. A    Clotting disorder ([facility]) D68.9    Abnormal EKG R94.31    Cancer ([facility]) C80.1    Over weight E66.3    Kidney transplanted Z94.0    ESRD (end stage renal disease) ([facility]) N18.6    Non morbid obesity due to excess calories E66.09    Hypertension I10    Leg swelling M79.89    Lymphedema of both lower extremities I89.0    Acute gout involving toe of right foot M10.9    Sepsis secondary to UTI ([facility]) A41.9, N39.0    Acute kidney injury superimposed on CKD ([facility]) N17.9, N18.9    Thyroid disease E07.9    Acute on chronic combined systolic (congestive) and diastolic (congestive) heart failure ([facility]) I50.43    Sepsis ([facility]) A41.9    Acute renal failure ([facility]) N17.9    CECILY (acute kidney injury) ([facility] N17.9    Ischemic bowel disease ([facility]) K55.9    Altered mental status R41.82    Coma ([facility] R40.20    Acute respiratory failure with hypoxia ([facility]) J96.01    Hypoxia O29.97    Acute metabolic encephalopathy B01.65    Acute deep vein thrombosis (DVT) of femoral vein of left lower extremity ([facility]) I82.412    Anemia D64.9    Positive blood culture R78.81    Difficult intravenous access Z78.9    Paroxysmal atrial fibrillation ([facility]) I48.0    Hypotension I95.9    LBBB (left bundle branch block) I44.7    History of DVT (deep vein thrombosis) Z86.718    Chronic anticoagulation Z79.01    Acute respiratory failure with hypoxemia ([facility]) J96.01       Isolation/Infection:   Isolation            Neutropenic          Patient Infection Status       Infection Onset Added Last Indicated Last Indicated By Review Planned Expiration Resolved Resolved By    C-diff (Clostridium difficile) 03/10/22 03/11/22 03/10/22 CLOSTRIDIUM DIFFICILE EIA 03/18/22       Previous admission    Resolved    COVID-19 (Rule Out) 05/17/22 05/17/22 05/17/22 COVID-19, Rapid (Ordered)   05/17/22 Rule-Out Test Resulted    COVID-19 (Rule Out) 04/21/22 04/21/22 04/21/22 COVID-19, Rapid (Ordered)   04/21/22 Rule-Out Test Resulted    C-diff Rule Out 03/10/22 03/10/22 03/10/22 CLOSTRIDIUM DIFFICILE EIA (Ordered)   22 Rule-Out Test Resulted    C-diff Rule Out 22 CLOSTRIDIUM DIFFICILE EIA (Ordered)   22 Elpidio Campbell RN    Order discontinued    COVID-19 (Rule Out) 22 Respiratory Panel, Molecular, with COVID-19 (Restricted: peds pts or suitable admitted adults) (Ordered)   22 Rule-Out Test Resulted    COVID-19 (Rule Out) 22 COVID-19, Rapid (Ordered)   22 Rule-Out Test Resulted    COVID-19 22 COVID-19   22     COVID-19 (Rule Out) 22 COVID-19 (Ordered)   22 Rule-Out Test Resulted    C-diff Rule Out 22 CLOSTRIDIUM DIFFICILE EIA (Ordered)   22 Cyril Lazaro RN    Order discontinued    COVID-19 (Rule Out) 22 Respiratory Panel, Molecular, with COVID-19 (Restricted: peds pts or suitable admitted adults) (Ordered)   22 Rule-Out Test Resulted    COVID-19 (Rule Out) 01/10/22 01/10/22 01/10/22 COVID-19, Rapid (Ordered)   01/10/22 Rule-Out Test Resulted    COVID-19 (Rule Out) 21 Respiratory Panel, Molecular, with COVID-19 (Restricted: peds pts or suitable admitted adults) (Ordered)   21 Rule-Out Test Resulted    COVID-19 (Rule Out) 21 COVID-19, Rapid (Ordered)   21 Rule-Out Test Resulted            Nurse Assessment:  Last Vital Signs: BP (!) 116/97   Pulse 86   Temp 97.9 °F (36.6 °C) (Oral)   Resp 19   Ht 5' 2\" (1.575 m)   Wt 200 lb (90.7 kg)   SpO2 96%   BMI 36.58 kg/m²     Last documented pain score (0-10 scale): Pain Level: 0  Last Weight:   Wt Readings from Last 1 Encounters:   22 200 lb (90.7 kg)     Mental Status:  oriented and alert    IV Access:  - None    Nursing Mobility/ADLs:  Walking   Assisted  Transfer  Assisted  Bathing Assisted  Dressing  Assisted  Toileting  Assisted  Feeding  Independent  Med Admin  Assisted  Med Delivery   whole    Wound Care Documentation and Therapy:  Incision 01/15/22 Abdomen Lower;Medial (Active)   Number of days: 122        Elimination:  Continence: Bowel: Yes  Bladder: No  Urinary Catheter: None   Colostomy/Ileostomy/Ileal Conduit: No       Date of Last BM: 5/21/22    No intake or output data in the 24 hours ending 05/18/22 0910  No intake/output data recorded. Safety Concerns: At Risk for Falls    Impairments/Disabilities:      None    Nutrition Therapy:  Current Nutrition Therapy:   - Oral Diet:  General and Low Sodium (2gm)    Routes of Feeding: Oral  Liquids: Thin Liquids  Daily Fluid Restriction: no  Last Modified Barium Swallow with Video (Video Swallowing Test): not done    Treatments at the Time of Hospital Discharge:   Respiratory Treatments: ***  Oxygen Therapy:  is not on home oxygen therapy.   Ventilator:    - No ventilator support    Rehab Therapies: Physical Therapy and Occupational Therapy  Weight Bearing Status/Restrictions: No weight bearing restrictions  Other Medical Equipment (for information only, NOT a DME order):  walker  Other Treatments:     Patient's personal belongings (please select all that are sent with patient):      RN SIGNATURE:  Electronically signed by Suellen Dow RN on 5/24/22 at 1:10 PM EDT    CASE MANAGEMENT/SOCIAL WORK SECTION    Inpatient Status Date: ***    Readmission Risk Assessment Score:  Readmission Risk              Risk of Unplanned Readmission:  0           Discharging to Facility/ Agency   Name:  [facility]anda   Phone: [phone]  Fax:    Dialysis Facility (if applicable)   Name:  Address:  Dialysis Schedule:  Phone:  Fax:    / signature: Electronically signed by AG Michaud on 5/19/22 at 10:23 AM EDT    PHYSICIAN SECTION    Prognosis: {Prognosis:2992014781}    Condition at Discharge: [address] Patient Condition:[id number]}    Rehab Potential (if transferring to Rehab): {Prognosis:0353511367}    Recommended Labs or Other Treatments After Discharge: ***    Physician Certification: I certify the above information and transfer of Aleena Rosales  is necessary for the continuing treatment of the diagnosis listed and that she requires {Admit to Appropriate Level of Care:94750} for {GREATER/LESS:582500788} 30 days. Update Admission H&P: {CHP DME Changes in DVNYZ:552186298}    PHYSICIAN SIGNATURE:  Electronically signed by Steven Arias MD on 5/24/22 at 6:02 PM EDT                           HEART FAILURE  / CONGESTIVE HEART FAILURE  DISCHARGE INSTRUCTIONS:  GUIDELINES TO FOLLOW AT [address]:     MEDICATIONS:  Take your medication as directed. If you are experiencing any side effects, inform your doctor, Do not stop taking any of your medications without letting your doctor know. Check with your doctor before taking any over-the-counter medications / herbal / or dietary supplements. They may interfere with your other medications. Do not take ibuprofen (Advil or Motrin) and naproxen (Aleve) without talking to your doctor first. They could make your heart failure worse. WEIGHT MONITORING:   Weigh yourself everyday (with the same scale) around the same time of the day and write it down. (you can chart them on a calendar or keep track of them on paper. Notify your doctor of a weight gain of 3 pounds or more in 1 day   OR a total of 5 pounds or more in 1 week    Take your weight record to your doctor visits  Also, the same goes if you loose more than 3# in one day, let your heart doctor know.          DIET:   Cardiac heart healthy diet- Low saturated / low trans fat, no added salt, caffeine restricted, Low sodium diet-   No more than 2,000mg (2 grams) of salt / sodium per day (which equals to a little less than  a teaspoon of salt)  If your doctor wants you on a fluid restriction. ..it is usually recommended a fluid limit of 2,000cc -  Fluid restriction- 2,000 ml (milliliters) = 64 ounces = you can have 8 glasses of fluid per day (each glass 8 ounces)    Follow a low salt diet - avoid using salt at the table, avoid / limit use of canned soups, processed / packaged foods, salted snacks, olives and pickles. Do not use a salt substitute without checking with your doctor, they may contain a high amount of potassioum. (Mrs. Wilson  is safe to use). Limit the use of alcohol       CALL YOUR DOCTOR THE FIRST DAY YOU NOTICE ANY OF THESE   SYMPTOMS:  You have a weight gain of 3 pounds or more in 1 day         OR 5 pounds or more in one week  More shortness of breath  More swelling of your stomach, legs, ankles or feet  Feeling more tired, No energy  Dry hacky cough  Dizziness  More chest pain / discomfort       (CALL 911 IF ANY OF THE FOLLOWING OCCURS  Chest pain (not relieved with nitroglycerine, if you have been prescribed this medication)  Severe shortness of breath  Faint / Pass out  Confusion / cannot think clearly  If symptoms get worse           SMOKING - TOBACCO USE:  * IF YOU SMOKE - STOP! Kick the habit. [address] Program is offered at [facility] and [address] [facility]. Call [phone] extension [phone] for more information. ACTIVITY:   (Ask your doctor when you will be able to return to work and before starting any exercise program.  Do not drive unless unless your doctor has given you permission to do so). Start light exercise. Even if you can only do a small amount, exercise will help you get stronger, have more energy, help manage your weight and decrease  stress. Walking is an easy way to get exercise.  Start out slowly and  increase the amount you walk as tolerated  If you become short of breath, dizzy or have chest pain; stop, sit down, and rest.  If you feel \"wiped out\" the day after you exercise, walk at a slower pace or for a shorter distance. You can gradually increase the pace or amount of time. (Do not exercise right after a meal or in extreme temperatures, such as above 85 degrees, if the air is really humid, or wind chill is less than 20 degrees)                                             ADDITIONAL INFORMATION:  Avoid getting sick from colds and the flu. Stay away from friends or family that you know may have a contagious illness  Get plenty of rest   Get a flu shot each year. Get a pneumococcal vaccine shot. If you have had one before, ask your doctor whether you need another dose. My Goal for Self-management of Heart Failure Includes 5 steps :    1. Notice a change in symptoms ( weight gain, short of breath, leg swelling, decreased activity level, bloating. ...)    2. Evaluate the change: (use the Heart Failure Zones )     3. Decide to take action: decide what your options are, such as: (call your doctor for an extra visit, take a prescribed medication, such as your water pill if your doctor has given you directions to do so, [address])    4. Come up with a strategy:  (now you call the doctor for advice / appointment. This is where you take action!!! Do not wait, catch the symptom early and treat it before it worsens. 5. Evaluate the response: The next day, check your Heart Failure Zones: are you in the GREEN ZONE (safe zone)? Worsening symptoms of YELLOW ZONE? Or have you moved to the RED ZONE and need to call 911 or go to the Emergency Room for evaluation? Call your doctor's office to update them on your symptoms of heart failure. Learning About Heart Failure Zones  What are heart failure zones? Heart failure zones give you an easy way to see changes in your heart failure symptoms. They also tell you when you need to get help. Check every day to see which zone you are in. Green zone. You are doing well. This is where you want to be.   Your weight is stable. It's not going up or down. You breathe easily. You are sleeping well. You are able to lie flat without shortness of breath. You can do your usual activities. Yellow zone. Be careful. Your symptoms are changing. Call your doctor. You have new or increased shortness of breath. You are dizzy or lightheaded, or you feel like you may faint. You have sudden weight gain, such as more than 2 to 3 pounds in a day or 5 pounds in a week. (Your doctor may suggest a different range of weight gain.)  You have increased swelling in your legs, ankles, or feet. You are so tired or weak that you can't do your usual activities. You are not sleeping well. Shortness of breath wakes you up at night. You need extra pillows. Red zone. 911  This is an emergency. Call . You have symptoms of sudden heart failure. For example: You have severe trouble breathing. You cough up pink, foamy mucus. You have a new irregular or fast heartbeat. You have symptoms of a heart attack. These may include:  Chest pain or pressure, or a strange feeling in the chest.  Sweating. Shortness of breath. Nausea or vomiting. Pain, pressure, or a strange feeling in the back, neck, jaw, or upper belly or in one or both shoulders or arms. Lightheadedness or sudden weakness. A fast or irregular heartbeat. If you have symptoms of a heart attack 911 : After you call , the  may tell you to chew 1 adult-strength or 2 to 4 low-dose aspirin. Wait for an ambulance. Do not try to drive yourself. Follow-up care is a key part of your treatment and safety. Be sure to make and go to all appointments, and call your doctor if you are having problems. It's also a good idea to know your test results and keep a list of the medicines you take. Where can you learn more? Go to https://blake.[employer]. org and sign in to your [employer] account.  Enter T174 in the [employer] box to learn more about \"Learning About Heart Failure Zones. \"     If you do not have an account, please click on the \"Sign Up Now\" link. Current as of: August 31, 2020               Content Version: 12.9  © 2006-2021 [employer]wise[employer] Inc[employer]. Care instructions adapted under license by [facility] ([facility]). If you have questions about a medical condition or this instruction, always ask your healthcare professional. [address] any warranty or liability for your use of this information.

## 2022-05-18 NOTE — PROGRESS NOTES
Patient reported that she has not voided since 6pm last night. Unable to locate bladder scanner in order to look for residual. All floor notified.

## 2022-05-18 NOTE — PROGRESS NOTES
[address] [facility]  [facility] [place]. [place]        Date:2022                                                  Patient Name: Little Richards    MRN: [id number]    : 1955    Room: [address] [place] [address]      Evaluating OT: Ariadne Doe OTR/L #[id number]     Referring Provider and Specific Provider Orders/Date:      22  OT eval and treat  Start:  22,   End:  22,   ONE TIME,   Standing Count:  1 Occurrences,   Yuli Anderson MD      Placement Recommendation: Subacute Rehab        Diagnosis:   1. Dyspnea, unspecified type    2.  Chronic renal insufficiency, stage 3 (moderate) (HCC)         Surgery: None      Pertinent Medical History:       Past Medical History:   Diagnosis Date    Abnormal EKG     left bundle branch block    Acute gout involving toe of right foot 2020    Acute on chronic combined systolic (congestive) and diastolic (congestive) heart failure (HCC)     Cancer (HCC)     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    Cerebrovascular disease     Clotting disorder ([address] 1985    thrombophlebitis after c section delivery    Depression     15 years followed by dr Campbell Pel Hem[facility] patient [facility])     History of blood transfusion     Hyperlipidemia     Hypertension     Hypothyroidism     Leg swelling 2020    Lymphedema of both lower extremities 2020    Peripheral vascular disease ([address]     Renal failure 2012    renal transplant    Thrombophlebitis     after c section delivery    Thyroid disease          Past Surgical History:   Procedure Laterality Date     SECTION  1985    one normal delivery    COLECTOMY N/A 1/15/2022    DIAGNOSTIC  LAPAROSCOPY LYSIS OF ADHESIONS performed by Manuelito Magaña MD at [address] [place] [address] Drive CATH LAB PROCEDURE      normal coronaries and 1996 normal coronaries    DIALYSIS FISTULA CREATION  march and july 2012    phase one and two patient will start dialysis when needed   [address]    transfemoral venous bypass grafts    IR IVC FILTER PLACEMENT W IMAGING  2/26/2022    IR IVC FILTER PLACEMENT W IMAGING 2/26/2022 [facility] SPECIAL PROCEDURES    KIDNEY TRANSPLANT  2016    KIDNEY TRANSPLANT  2015    KIDNEY TRANSPLANT  2015    PARATHYROIDECTOMY  2006    left parathyroid  implant and parathyroidectomy    TRANSESOPHAGEAL ECHOCARDIOGRAM N/A 2/28/2022    TRANSESOPHAGEAL ECHOCARDIOGRAM WITH BUBBLE STUDY performed by Jag Wesley MD at [facility] ENDOSCOPY        Precautions:  Fall Risk, falls and alarm, O2, contact isolation, C-diff infection, neutropenic precautions.       Assessment of current deficits    [x] Functional mobility  [x]ADLs  [x] Strength               [x]Cognition    [x] Functional transfers   [x] IADLs         [x] Safety Awareness   [x]Endurance    [] Fine Coordination              [x] Balance      [] Vision/perception   []Sensation     []Gross Motor Coordination  [] ROM  [] Delirium                   [] Motor Control     OT PLAN OF CARE   OT POC based on physician orders, patient diagnosis and results of clinical assessment    Frequency/Duration 1-3 days/wk for 2 weeks PRN     Specific OT Treatment Interventions to include:   * Instruction/training on adapted ADL techniques and AE recommendations to increase functional independence within precautions       * Training on energy conservation strategies, correct breathing pattern and techniques to improve independence/tolerance for self-care routine  * Functional transfer/mobility training/DME recommendations for increased independence, safety, and fall prevention  * Patient/Family education to increase follow through with safety techniques and functional independence  * Recommendation of environmental modifications for increased safety with functional transfers/mobility and ADLs  * Cognitive retraining/development of therapeutic activities to improve problem solving, judgement, memory, and attention for increased safety/participation in ADL/IADL tasks  * Therapeutic exercise to improve motor endurance, ROM, and functional strength for ADLs/functional transfers  * Therapeutic activities to facilitate/challenge dynamic balance, stand tolerance for increased safety and independence with ADLs  * Therapeutic activities to facilitate gross/fine motor skills for increased independence with ADLs  * Positioning to improve skin integrity, interaction with environment and functional independence  * Delirium prevention/treatment    Recommended Adaptive Equipment: TBD     Home Living: Pt presented from rehab at [facility]     Prior Level of Function: Pt has had multiple hospitalizations and rehab stays over the last 6 months. Has been requiring assist with ADLs and ambulating with assist using wheeled walker at rehab. Pain Level: Pt denied pain; Nursing notified. Cognition: A&O: 2/4 - self and time only; Follows 2 step directions - Pt lethargic initially with some confusion noted however became increasingly alert once sitting upright.      Memory: impaired   Sequencing: poor   Problem solving: poor   Judgement/safety: poor    [facility]   AM-PAC Daily Activity Inpatient   How much help for putting on and taking off regular lower body clothing?: Total  How much help for Bathing?: A Lot  How much help for Toileting?: Total  How much help for putting on and taking off regular upper body clothing?: A Lot  How much help for taking care of personal grooming?: A Lot  How much help for eating meals?: A Little  AM-[facility] Inpatient Daily Activity Raw Score: 11  AM-PAC Inpatient ADL T-Scale Score : 29.04  ADL Inpatient CMS 0-100% Score: 70.42  ADL Inpatient CMS G-Code Modifier : CL     Functional Assessment:    Initial Eval Status  Date: 5/18/22   Treatment Status  Date: STGs = LTGs  Time frame: 10-14 days Feeding Supervision     Independent    Grooming Moderate Assist     Supervision    UB Dressing Moderate Assist    Supervision    LB Dressing Dependent     Minimal Assist    Bathing Moderate/Maximal Assist     Minimal Assist    Toileting Dependent     Minimal Assist    Bed Mobility  Supine to sit: Minimal Assist   Sit to supine: Supervision       Supine to sit: Independent   Sit to supine: Independent    Functional Transfers Sit to stand: Minimal Assist   Stand to sit: Minimal Assist     Transfer training with verbal cues for hand placement to improve safety. Independent   Functional Mobility Minimal Assist with wheeled walker to improve balance 2-3 side steps along side of bed, verbal cues for walker sequence and safety. Moderate [place] with use of wheeled walker    Balance Sitting:     Static: fair plus    Dynamic: fair   Standing: fair/ fair minus     Sitting:     Static: good    Dynamic: good  Standing: good    Activity Tolerance fair  / fair minus; standing tolerance <1 min. Increase standing tolerance >3 minutes for improved engagement with functional transfers and indep in ADLs     Visual/  Perceptual Glasses: Readers    Reports changes in vision since admission: No      NA      Hand Dominance: Right     AROM (PROM) Strength Additional Info:  Goal:   RUE  WFL 3+/5 good  and wfl FMC/dexterity noted during ADL tasks   Improve overall RUE strength  for participation in functional tasks   LUE WFL 3+/5 good  and wfl FMC/dexterity noted during ADL tasks   Improve overall LUE strength  for participation in functional tasks     Hearing:  [facility]   Sensation:   No c/o numbness or tingling  Tone:  WFL   Edema: None     Vitals:  HR at rest: 92 bpm HR with activity: 90s bpm HR at end of session: 90s bpm   SpO2 at rest: 94% SpO2 with activity: 90-92%, decreasing to 85% post-stand but quickly recovering into low 90s with seated rest break. Pt denied SOB.     SpO2 at end of session: 93%   BP at rest: BP with activity:  BP at end of session:      Comments: RN cleared patient for OT. Upon arrival patient in supine. Therapist facilitated and instructed pt on adapted  techniques & compensatory strategies to improve safety and independence with basic ADLs, bed mobility, functional transfers and mobility to allow pt to achieve highest level of independence and safely. Pt demonstrated fair understanding of education & follow through. At end of session, patient was supine, alarm on, with call light and phone within reach, all lines and tubes intact. Overall, patient demonstrated  decreased independence and safety during completion of ADL tasks. Pt would benefit from continued skilled OT to increase safety and independence with completion of ADL tasks and functional mobility for improved quality of life. Rehab Potential: Good for established goals. Patient / Family Goal: Pt in agreement with POC     Patient and/or family were instructed on functional diagnosis, prognosis/goals and OT plan of care. Demonstrated fair understanding. Eval Complexity: Low    Time In: 11:30 AM   Time Out: 11:50 AM    Total Treatment Time: 0       Min Units   OT Eval Low 97165  X  1    OT Eval Medium 87655      OT Eval High 40132      OT Re-Eval [id number]            ADL/Self Care 69317     Therapeutic Activities 59896       Therapeutic Ex 76031       Orthotic Management 98355       Manual 30271     Neuro Re-Ed 80201       Non-Billable Time        Evaluation Time additionally includes thorough review of current medical information, gathering information on past medical history/social history and prior level of function, interpretation of standardized testing/informal observation of tasks, assessment of data and development of plan of care and goals.         Evaluating OT: Evelia Bray OTR/L #[id number]

## 2022-05-18 NOTE — PLAN OF CARE
Patient's chart updated to reflect:      . -HF discharge instructions.     Delia Saenz RN   Heart Failure Navigator

## 2022-05-19 PROBLEM — J44.1 ACUTE EXACERBATION OF CHRONIC OBSTRUCTIVE PULMONARY DISEASE (COPD) (HCC): Status: ACTIVE | Noted: 2022-05-19

## 2022-05-19 LAB
ALBUMIN SERPL-MCNC: 3.1 G/DL (ref 3.5–5.2)
ALP BLD-CCNC: 73 U/L (ref 35–104)
ALT SERPL-CCNC: 10 U/L (ref 0–32)
ANION GAP SERPL CALCULATED.3IONS-SCNC: 11 MMOL/L (ref 7–16)
AST SERPL-CCNC: 14 U/L (ref 0–31)
BILIRUB SERPL-MCNC: 0.4 MG/DL (ref 0–1.2)
BUN BLDV-MCNC: 24 MG/DL (ref 6–23)
CALCIUM SERPL-MCNC: 10.1 MG/DL (ref 8.6–10.2)
CHLORIDE BLD-SCNC: 100 MMOL/L (ref 98–107)
CO2: 31 MMOL/L (ref 22–29)
CREAT SERPL-MCNC: 1.8 MG/DL (ref 0.5–1)
GFR AFRICAN AMERICAN: 34
GFR NON-AFRICAN AMERICAN: 28 ML/MIN/1.73
GLUCOSE BLD-MCNC: 94 MG/DL (ref 74–99)
POTASSIUM SERPL-SCNC: 4.3 MMOL/L (ref 3.5–5)
SODIUM BLD-SCNC: 142 MMOL/L (ref 132–146)
TOTAL PROTEIN: 5.7 G/DL (ref 6.4–8.3)

## 2022-05-19 PROCEDURE — 1200000000 HC SEMI PRIVATE

## 2022-05-19 PROCEDURE — 6360000002 HC RX W HCPCS: Performed by: INTERNAL MEDICINE

## 2022-05-19 PROCEDURE — 80053 COMPREHEN METABOLIC PANEL: CPT

## 2022-05-19 PROCEDURE — 2700000000 HC OXYGEN THERAPY PER DAY

## 2022-05-19 PROCEDURE — 97530 THERAPEUTIC ACTIVITIES: CPT

## 2022-05-19 PROCEDURE — 36415 COLL VENOUS BLD VENIPUNCTURE: CPT

## 2022-05-19 PROCEDURE — 94640 AIRWAY INHALATION TREATMENT: CPT

## 2022-05-19 PROCEDURE — 97110 THERAPEUTIC EXERCISES: CPT

## 2022-05-19 PROCEDURE — 6370000000 HC RX 637 (ALT 250 FOR IP): Performed by: INTERNAL MEDICINE

## 2022-05-19 RX ORDER — AMOXICILLIN AND CLAVULANATE POTASSIUM 875; 125 MG/1; MG/1
1 TABLET, FILM COATED ORAL EVERY 12 HOURS SCHEDULED
Status: DISCONTINUED | OUTPATIENT
Start: 2022-05-19 | End: 2022-05-21 | Stop reason: DRUGHIGH

## 2022-05-19 RX ORDER — METHYLPREDNISOLONE SODIUM SUCCINATE 40 MG/ML
40 INJECTION, POWDER, LYOPHILIZED, FOR SOLUTION INTRAMUSCULAR; INTRAVENOUS DAILY
Status: DISCONTINUED | OUTPATIENT
Start: 2022-05-19 | End: 2022-05-20

## 2022-05-19 RX ORDER — CIPROFLOXACIN 500 MG/1
500 TABLET, FILM COATED ORAL
Status: DISCONTINUED | OUTPATIENT
Start: 2022-05-19 | End: 2022-05-24 | Stop reason: HOSPADM

## 2022-05-19 RX ADMIN — CYCLOSPORINE 100 MG: 25 CAPSULE, GELATIN COATED ORAL at 08:28

## 2022-05-19 RX ADMIN — FOLIC ACID 1 MG: 1 TABLET ORAL at 08:25

## 2022-05-19 RX ADMIN — GUAIFENESIN 600 MG: 600 TABLET, EXTENDED RELEASE ORAL at 21:16

## 2022-05-19 RX ADMIN — IPRATROPIUM BROMIDE AND ALBUTEROL SULFATE 1 AMPULE: .5; 2.5 SOLUTION RESPIRATORY (INHALATION) at 17:26

## 2022-05-19 RX ADMIN — MYCOPHENOLATE MOFETIL 1000 MG: 250 CAPSULE ORAL at 21:15

## 2022-05-19 RX ADMIN — IPRATROPIUM BROMIDE AND ALBUTEROL SULFATE 1 AMPULE: .5; 2.5 SOLUTION RESPIRATORY (INHALATION) at 10:50

## 2022-05-19 RX ADMIN — SODIUM BICARBONATE 650 MG: 650 TABLET ORAL at 08:27

## 2022-05-19 RX ADMIN — ANTI-FUNGAL POWDER MICONAZOLE NITRATE TALC FREE: 1.42 POWDER TOPICAL at 09:59

## 2022-05-19 RX ADMIN — GUAIFENESIN 600 MG: 600 TABLET, EXTENDED RELEASE ORAL at 08:26

## 2022-05-19 RX ADMIN — Medication 125 MG: at 15:23

## 2022-05-19 RX ADMIN — Medication 125 MG: at 21:15

## 2022-05-19 RX ADMIN — DESVENLAFAXINE 50 MG: 50 TABLET, EXTENDED RELEASE ORAL at 18:10

## 2022-05-19 RX ADMIN — CIPROFLOXACIN HYDROCHLORIDE 500 MG: 500 TABLET, FILM COATED ORAL at 09:57

## 2022-05-19 RX ADMIN — ANTI-FUNGAL POWDER MICONAZOLE NITRATE TALC FREE: 1.42 POWDER TOPICAL at 21:14

## 2022-05-19 RX ADMIN — LEVOTHYROXINE SODIUM 50 MCG: 0.05 TABLET ORAL at 08:26

## 2022-05-19 RX ADMIN — IPRATROPIUM BROMIDE AND ALBUTEROL SULFATE 1 AMPULE: .5; 2.5 SOLUTION RESPIRATORY (INHALATION) at 13:35

## 2022-05-19 RX ADMIN — METOPROLOL TARTRATE 12.5 MG: 25 TABLET, FILM COATED ORAL at 08:25

## 2022-05-19 RX ADMIN — PREDNISONE 5 MG: 5 TABLET ORAL at 08:26

## 2022-05-19 RX ADMIN — METOPROLOL TARTRATE 12.5 MG: 25 TABLET, FILM COATED ORAL at 21:15

## 2022-05-19 RX ADMIN — BUDESONIDE 500 MCG: 0.5 INHALANT RESPIRATORY (INHALATION) at 17:26

## 2022-05-19 RX ADMIN — CYCLOSPORINE 100 MG: 25 CAPSULE, GELATIN COATED ORAL at 21:15

## 2022-05-19 RX ADMIN — PANTOPRAZOLE SODIUM 40 MG: 40 TABLET, DELAYED RELEASE ORAL at 06:17

## 2022-05-19 RX ADMIN — METHYLPREDNISOLONE SODIUM SUCCINATE 40 MG: 40 INJECTION, POWDER, FOR SOLUTION INTRAMUSCULAR; INTRAVENOUS at 09:57

## 2022-05-19 RX ADMIN — Medication 1000 UNITS: at 08:25

## 2022-05-19 RX ADMIN — MAGNESIUM OXIDE 400 MG: 400 TABLET ORAL at 08:26

## 2022-05-19 RX ADMIN — APIXABAN 5 MG: 5 TABLET, FILM COATED ORAL at 21:16

## 2022-05-19 RX ADMIN — ACYCLOVIR 400 MG: 400 TABLET ORAL at 21:16

## 2022-05-19 RX ADMIN — ACYCLOVIR 400 MG: 400 TABLET ORAL at 08:26

## 2022-05-19 RX ADMIN — Medication 125 MG: at 10:01

## 2022-05-19 RX ADMIN — ATORVASTATIN CALCIUM 10 MG: 10 TABLET, FILM COATED ORAL at 21:16

## 2022-05-19 RX ADMIN — AMOXICILLIN AND CLAVULANATE POTASSIUM 1 TABLET: 875; 125 TABLET, FILM COATED ORAL at 09:56

## 2022-05-19 RX ADMIN — APIXABAN 5 MG: 5 TABLET, FILM COATED ORAL at 08:26

## 2022-05-19 RX ADMIN — ALLOPURINOL 100 MG: 100 TABLET ORAL at 08:26

## 2022-05-19 RX ADMIN — MYCOPHENOLATE MOFETIL 1000 MG: 250 CAPSULE ORAL at 08:29

## 2022-05-19 RX ADMIN — AMOXICILLIN AND CLAVULANATE POTASSIUM 1 TABLET: 875; 125 TABLET, FILM COATED ORAL at 21:15

## 2022-05-19 RX ADMIN — IPRATROPIUM BROMIDE AND ALBUTEROL SULFATE 1 AMPULE: .5; 2.5 SOLUTION RESPIRATORY (INHALATION) at 06:56

## 2022-05-19 RX ADMIN — BUDESONIDE 500 MCG: 0.5 INHALANT RESPIRATORY (INHALATION) at 06:56

## 2022-05-19 RX ADMIN — CLOZAPINE 50 MG: 25 TABLET ORAL at 21:16

## 2022-05-19 ASSESSMENT — PAIN SCALES - GENERAL
PAINLEVEL_OUTOF10: 0
PAINLEVEL_OUTOF10: 0

## 2022-05-19 ASSESSMENT — ENCOUNTER SYMPTOMS
EYES NEGATIVE: 1
GASTROINTESTINAL NEGATIVE: 1
SHORTNESS OF BREATH: 1
ALLERGIC/IMMUNOLOGIC NEGATIVE: 1

## 2022-05-19 NOTE — PROGRESS NOTES
Progress Note  Date:2022       Room:[facility]-  Patient Inga Coyle     YOB: 1955     Age:67 y.o. Subjective    Subjective:  Symptoms:  Stable. Diet:  Adequate intake. Activity level: Impaired due to weakness. Pain:  She reports no pain. Review of Systems  Objective         Vitals Last 24 Hours:  TEMPERATURE:  Temp  Av °F (36.7 °C)  Min: 97.9 °F (36.6 °C)  Max: 98.1 °F (36.7 °C)  RESPIRATIONS RANGE: Resp  Av  Min: 18  Max: 20  PULSE OXIMETRY RANGE: SpO2  Av.5 %  Min: 90 %  Max: 96 %  PULSE RANGE: Pulse  Av.7  Min: 88  Max: 91  BLOOD PRESSURE RANGE: Systolic (07PNX), VUM:220 , Min:130 , WXP:815   ; Diastolic (04NJC), LNL:55, Min:60, Max:67    I/O (24Hr): Intake/Output Summary (Last 24 hours) at 2022 194  Last data filed at 2022 1437  Gross per 24 hour   Intake 540 ml   Output 775 ml   Net -235 ml     Objective:  General Appearance:  Comfortable. Vital signs: (most recent): Blood pressure (!) 145/67, pulse 88, temperature 98.1 °F (36.7 °C), temperature source Oral, resp. rate 18, height 5' 2\" (1.575 m), weight 200 lb (90.7 kg), SpO2 92 %. Vital signs are normal.    Output: Producing urine. HEENT: Normal HEENT exam.    Lungs:  (Few expiratory wheezing )  Heart: Normal rate. Regular rhythm. S1 normal.  Positive for murmur. Abdomen: Abdomen is soft and distended. Bowel sounds are normal.   There is no abdominal tenderness. Extremities: There is no dependent edema. Pulses: Distal pulses are intact. Neurological: Patient is alert and oriented to person, place and time.       Labs/Imaging/Diagnostics    Labs:  CBC:  Recent Labs     22  1245 22  1028   WBC SEE BELOW*  6.6 4.4*   RBC SEE BELOW*  3.15* 3.34*   HGB SEE BELOW*  10.4* 10.8*   HCT SEE BELOW*  35.1 37.5   MCV SEE BELOW*  111.4* 112.3*   RDW SEE BELOW*  16.4* 16.8*   PLT SEE BELOW*  174 172     CHEMISTRIES:  Recent Labs 05/17/22  1245 05/18/22  1028 05/19/22  0858    144 142   K 4.8 4.2 4.3   CL 98 102 100   CO2 31* 29 31*   BUN 24* 22 24*   CREATININE 2.0* 1.9* 1.8*   GLUCOSE 160* 110* 94   MG 1.9  --   --      PT/INR:  Recent Labs     05/17/22  1245 05/18/22  1028   PROTIME 11.4 13.1*   INR 1.0 1.1     APTT:  Recent Labs     05/17/22  1245 05/18/22  1028   APTT 26.3 31.7     LIVER PROFILE:  Recent Labs     05/17/22  1245 05/18/22  1028 05/19/22  0858   AST 20 13 14   ALT 11 8 10   BILITOT 0.5 0.5 0.4   ALKPHOS 85 80 73       Imaging Last 24 Hours:  NM LUNG VENT/PERFUSION (VQ)    Result Date: 5/18/2022  EXAMINATION: NUCLEAR MEDICINE VENTILATION PERFUSION SCAN. 5/18/2022 TECHNIQUE: 51.9 millicuries aerosolized Tc99m DTPA was administered via mask prior to planar imaging of the lungs in multiple projections. Then, 5.4 millicuries of Tc 62S MAA was administered intravenously prior to planar imaging of the lungs in similar projections. COMPARISON: Chest radiograph 05/17/2022. HISTORY: ORDERING SYSTEM PROVIDED HISTORY: R/O PE TECHNOLOGIST PROVIDED HISTORY: Reason for exam:->R/O PE What reading provider will be dictating this exam?->CRC FINDINGS: PERFUSION: Mildly heterogeneous distribution of radiotracer. The perfusion is overall improved compared to the ventilation. No unmatched perfusion defects. VENTILATION: Heterogeneous distribution of radiotracer. CHEST RADIOGRAPH: No focal areas of consolidation or significant effusions on recent chest radiograph. Very low probability for pulmonary embolism. Assessment//Plan           Hospital Problems           Last Modified POA    * (Principal) Hypoxia 5/17/2022 Yes    Acute exacerbation of chronic obstructive pulmonary disease (COPD) ([facility] [address] [address] 5/19/2022 Yes        Assessment:    Condition: In stable condition. (COPD exacerbation , add solumedrol 40 mg iv , continue aerosol treatment ,   V/q scan low probability .      Acute on chronic renal failure , creatinine slightly better hold lasix for now ,     H/o DVT 2/2022 on eliquis till end of august , s/p IVC filter . ESBL in the urine on augmentin and cipr on the urine done in the NH suppose to have it for 2 weeks , 1 more week left     Anemia of chronic disease . Hypothyroidism , continue synthroid.     h/o c diff colitis  On vancomycin tapering dose   Possible discharge tomorrow        ).        Electronically signed by Geeta Grant MD on 5/19/22 at 7:40 PM EDT

## 2022-05-19 NOTE — PROGRESS NOTES
Pulmonary/Critical Care Progress Note    We are following patient for hypoxemia    SUBJECTIVE:  69-year-old female with a history of known congestive heart failure, COPD, prior CVA, renal transplant, PVD, DVT on Eliquis and with a IVC filter placement, hypertension, bipolar presents with symptoms of shortness of breath and hypoxemia. Patient has no significant cough or sputum production hemoptysis change in swallowing bowel or bladder habits. History in the ER was negative and venous duplexes were negative. Past medical history reviewed by chart base. Allergies: Macrodantin and sulfa    MEDICATIONS:   amoxicillin-clavulanate  1 tablet Oral 2 times per day    ciprofloxacin  500 mg Oral Daily    methylPREDNISolone  40 mg IntraVENous Daily    ipratropium-albuterol  1 ampule Inhalation 4x Daily    acyclovir  400 mg Oral BID    allopurinol  100 mg Oral Daily    atorvastatin  10 mg Oral Nightly    budesonide  500 mcg Nebulization BID    cloZAPine  50 mg Oral Nightly    cycloSPORINE  100 mg Oral BID    desvenlafaxine succinate  50 mg Oral QPM    folic acid  1 mg Oral Daily    guaiFENesin  600 mg Oral BID    levothyroxine  50 mcg Oral Daily    magnesium oxide  400 mg Oral Daily    metoprolol tartrate  12.5 mg Oral BID    miconazole   Topical BID    pantoprazole  40 mg Oral QAM AC    sodium bicarbonate  650 mg Oral Daily    vancomycin  125 mg Oral TID    Vitamin D  1,000 Units Oral Daily    apixaban  5 mg Oral BID    mycophenolate  1,000 mg Oral BID       acetaminophen, diphenhydrAMINE, midodrine, simethicone    Review of Systems   Constitutional: Negative. HENT: Negative. Eyes: Negative. Respiratory: Positive for shortness of breath. Cardiovascular: Negative. Gastrointestinal: Negative. Endocrine: Negative. Genitourinary: Negative. Musculoskeletal: Negative. Skin: Negative. Allergic/Immunologic: Negative. Neurological: Negative. Hematological: Negative. Psychiatric/Behavioral: Negative. OBJECTIVE:  Vitals:    05/19/22 0830   BP: (!) 145/67   Pulse: 88   Resp: 18   Temp: 98.1 °F (36.7 °C)   SpO2: 92%        O2 Flow Rate (L/min): 2 L/min  O2 Device: None (Room air)    PHYSICAL EXAM:  Constitutional: Pleasant middle-aged woman normocephalic neck is supple  Skin: No skin breakdown no cyanosis  HEENT: P ER LA EOMI pharynx is clear no thrush no sinus tenderness external ears intact. Neck: No JVD no trach deviation no crepitance  Cardiovascular: Rate and rhythm regular no gallop no murmur  Respiratory: Diminished to bases no consolidation  Gastrointestinal: Soft bowel sounds present no peritoneal signs  Genitourinary: Deferred  Extremities: +2/4 pulses no signs of palpable DVT no significant edema  Neurological: Alert interactive pleasant cranial nerves intact gross sensorimotor intact gait was not observed  Psychological: Appropriate    LABS:  WBC   Date Value Ref Range Status   05/18/2022 4.4 (L) 4.5 - 11.5 E9/L Final   05/17/2022 SEE BELOW (AA) 4.5 - 11.5 E9/L Corrected     Comment:     DUPLICATE ORDER.   Corrected result; previously reported as 6.4 on 05/17/2022 at 13:25 by [facility]     05/17/2022 6.6 4.5 - 11.5 E9/L Final     Hemoglobin   Date Value Ref Range Status   05/18/2022 10.8 (L) 11.5 - 15.5 g/dL Final   05/17/2022 SEE BELOW (AA) 11.5 - 15.5 g/dL Corrected     Comment:     Corrected result; previously reported as 10.4 on 05/17/2022 at 13:25 by TAR[facility]   05/17/2022 10.4 (L) 11.5 - 15.5 g/dL Final     Hematocrit   Date Value Ref Range Status   05/18/2022 37.5 34.0 - 48.0 % Final   05/17/2022 SEE BELOW (AA) 34.0 - 48.0 % Corrected     Comment:     Corrected result; previously reported as 35.0 on 05/17/2022 at 13:25 by TARLA   05/17/2022 35.1 34.0 - 48.0 % Final     MCV   Date Value Ref Range Status   05/18/2022 112.3 (H) 80.0 - 99.9 fL Final   05/17/2022 SEE BELOW (AA) 80.0 - 99.9 fL Corrected     Comment:     Corrected result; previously reported as 113.3 on 05/17/2022 at 13:25 by TARLA   05/17/2022 111.4 (H) 80.0 - 99.9 fL Final     Platelets   Date Value Ref Range Status   05/18/2022 172 130 - 450 E9/L Final   05/17/2022 SEE BELOW (AA) 130 - 450 E9/L Corrected     Comment:     Corrected result; previously reported as 172 on 05/17/2022 at 13:25 by TARLA   05/17/2022 174 130 - 450 E9/L Final     Sodium   Date Value Ref Range Status   05/19/2022 142 132 - 146 mmol/L Final   05/18/2022 144 132 - 146 mmol/L Final   05/17/2022 140 132 - 146 mmol/L Final     Potassium   Date Value Ref Range Status   05/19/2022 4.3 3.5 - 5.0 mmol/L Final   05/18/2022 4.2 3.5 - 5.0 mmol/L Final   05/17/2022 4.8 3.5 - 5.0 mmol/L Final     Comment:     Specimen is moderately Hemolyzed. Result may be artificially increased. Potassium reflex Magnesium   Date Value Ref Range Status   04/21/2022 4.3 3.5 - 5.0 mmol/L Final   03/09/2022 4.7 3.5 - 5.0 mmol/L Final   02/26/2022 4.7 3.5 - 5.0 mmol/L Final     Comment:     Specimen is moderately Hemolyzed. Result may be artificially increased.      Chloride   Date Value Ref Range Status   05/19/2022 100 98 - 107 mmol/L Final   05/18/2022 102 98 - 107 mmol/L Final   05/17/2022 98 98 - 107 mmol/L Final     CO2   Date Value Ref Range Status   05/19/2022 31 (H) 22 - 29 mmol/L Final   05/18/2022 29 22 - 29 mmol/L Final   05/17/2022 31 (H) 22 - 29 mmol/L Final     BUN   Date Value Ref Range Status   05/19/2022 24 (H) 6 - 23 mg/dL Final   05/18/2022 22 6 - 23 mg/dL Final   05/17/2022 24 (H) 6 - 23 mg/dL Final     CREATININE   Date Value Ref Range Status   05/19/2022 1.8 (H) 0.5 - 1.0 mg/dL Final   05/18/2022 1.9 (H) 0.5 - 1.0 mg/dL Final   05/17/2022 2.0 (H) 0.5 - 1.0 mg/dL Final     Glucose   Date Value Ref Range Status   05/19/2022 94 74 - 99 mg/dL Final   05/18/2022 110 (H) 74 - 99 mg/dL Final   05/17/2022 160 (H) 74 - 99 mg/dL Final     Calcium   Date Value Ref Range Status   05/19/2022 10.1 8.6 - 10.2 mg/dL Final   05/18/2022 10.7 (H) 8.6 - 10.2 mg/dL Final   05/17/2022 10.4 (H) 8.6 - 10.2 mg/dL Final     Total Protein   Date Value Ref Range Status   05/19/2022 5.7 (L) 6.4 - 8.3 g/dL Final   05/18/2022 6.4 6.4 - 8.3 g/dL Final   05/17/2022 6.6 6.4 - 8.3 g/dL Final     Albumin   Date Value Ref Range Status   05/19/2022 3.1 (L) 3.5 - 5.2 g/dL Final   05/18/2022 3.4 (L) 3.5 - 5.2 g/dL Final   05/17/2022 3.5 3.5 - 5.2 g/dL Final     Total Bilirubin   Date Value Ref Range Status   05/19/2022 0.4 0.0 - 1.2 mg/dL Final   05/18/2022 0.5 0.0 - 1.2 mg/dL Final   05/17/2022 0.5 0.0 - 1.2 mg/dL Final     Alkaline Phosphatase   Date Value Ref Range Status   05/19/2022 73 35 - 104 U/L Final   05/18/2022 80 35 - 104 U/L Final   05/17/2022 85 35 - 104 U/L Final     AST   Date Value Ref Range Status   05/19/2022 14 0 - 31 U/L Final   05/18/2022 13 0 - 31 U/L Final   05/17/2022 20 0 - 31 U/L Final     Comment:     Specimen is moderately Hemolyzed. Result may be artificially increased. ALT   Date Value Ref Range Status   05/19/2022 10 0 - 32 U/L Final   05/18/2022 8 0 - 32 U/L Final   05/17/2022 11 0 - 32 U/L Final     GFR Non-   Date Value Ref Range Status   05/19/2022 28 >=60 mL/min/1.73 Final     Comment:     Chronic Kidney Disease: less than 60 ml/min/1.73 sq.m. Kidney Failure: less than 15 ml/min/1.73 sq.m. Results valid for patients 18 years and older. 05/18/2022 26 >=60 mL/min/1.73 Final     Comment:     Chronic Kidney Disease: less than 60 ml/min/1.73 sq.m. Kidney Failure: less than 15 ml/min/1.73 sq.m. Results valid for patients 18 years and older. 05/17/2022 25 >=60 mL/min/1.73 Final     Comment:     Chronic Kidney Disease: less than 60 ml/min/1.73 sq.m. Kidney Failure: less than 15 ml/min/1.73 sq.m. Results valid for patients 18 years and older.        GFR    Date Value Ref Range Status   05/19/2022 34  Final   05/18/2022 32  Final   05/17/2022 30  Final     Magnesium   Date Value Ref Range Status 05/17/2022 1.9 1.6 - 2.6 mg/dL Final   04/26/2022 2.1 1.6 - 2.6 mg/dL Final   04/25/2022 1.3 (L) 1.6 - 2.6 mg/dL Final     Phosphorus   Date Value Ref Range Status   03/16/2022 3.0 2.5 - 4.5 mg/dL Final   03/15/2022 3.4 2.5 - 4.5 mg/dL Final   03/13/2022 4.6 (H) 2.5 - 4.5 mg/dL Final     No results for input(s): PH, PO2, PCO2, HCO3, BE, O2SAT in the last 72 hours. RADIOLOGY:  NM LUNG VENT/PERFUSION (VQ)   Final Result   Very low probability for pulmonary embolism. US DUP LOWER EXTREMITIES BILATERAL VENOUS   Final Result   No evidence of DVT in either lower extremity. XR CHEST PORTABLE   Final Result   1. Cardiomegaly. There are no findings of failure or pneumonia. PROBLEM LIST:  Principal Problem:    Hypoxia  Active Problems:    Acute exacerbation of chronic obstructive pulmonary disease (COPD) ([facility] [address]  Resolved Problems:    * No resolved hospital problems. *      IMPRESSION:  1. Exacerbation COPD with initial hypoxemia now recovered  2. Multiple medical problems including renal transplant, hypertension, COPD and prior CVA  3. Chronic immunosuppression with kidney transplant  4. History of thromboembolic disease however no acute signs at this time there is overlying. Patient is anticoagulated otherwise. PLAN:  1. Tapering steroids respiratory treatments  2. Supplemental oxygen as needed    ATTESTATION:  ICU Staff Physician note of personal involvement in Care  As the attending physician, I certify that I personally reviewed the patients history and personnally examined the patient to confirm the physical findings described above,  And that I reviewed the relevant imaging studies and available reports. I also discussed the differential diagnosis and all of the proposed management plans with the patient and individuals accompanying the patient to this visit. They had the opportunity to ask questions about the proposed management plans and to have those questions answered. Lorrie Bates DO.   Intensivist   Electronically signed by Lorrie Bates DO on 5/19/2022 at 6:31 PM

## 2022-05-19 NOTE — H&P
[address]                              HISTORY AND PHYSICAL    PATIENT NAME: Nito Pulido              :        1955  MED REC NO:   [id number]                            ROOM:       0866  ACCOUNT NO:   [de-identified]                           ADMIT DATE: 2022  PROVIDER:     Andie Michele MD    CHIEF COMPLAINT:  Shortness of breath. HISTORY OF PRESENT ILLNESS:  This is a 56-year-old white lady with  significant past medical history of chronic renal failure, status post  renal transplant at [facility] in , bipolar disorder,  hypertension, hyperlipidemia, hypothyroidism, and history of C.  difficile colitis who was in the nursing home and she was doing well and  she started having increasing shortness of breath over the last few  weeks. They had to increase her oxygen, but she has been having it on  and off and not getting better. She was then sent to the emergency room  for further evaluation. Chest x-ray did not reveal any acute  abnormality. A venous Doppler ultrasound of her lower extremities did  not reveal any DVT, but her creatinine was worse than last admission, it  was at 2. She was admitted for further evaluation of her shortness of  breath, hypoxia, and for her acute-on-chronic renal failure. PAST MEDICAL HISTORY:  1. Chronic renal failure, status post renal transplant in ,  baseline creatinine about 1.5 to 1.6. The transplant was in [facility].  2.  Hypertension. 3.  Bipolar disorder. 4.  Hyperlipidemia. 5.  Obesity. 6.  Hypothyroidism. 7.  Left mid and distal femoral vein and popliteal vein DVT,  non-occlusive. 8.  IVC filter placement for possible GI bleed after her DVT. 9.  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 10.  Tachycardia. PAST SURGICAL HISTORY:  1.  . 2.  Dialysis catheter in .   3.  Kidney transplant in  at [facility].  4.  Parathyroidectomy. 5.  IVC filter placed in 02/2022. DVT and possible GI bleed at that  time. ALLERGIES:  She is allergic to [facility] and SULFA. SOCIAL HISTORY:  She quit smoking about eight years ago. She used to  smoke 20 packs per year for many years. She does not drink any alcohol. Never used drugs. She is living by herself, but recently she has been  in rehab due to recurrent hospitalization. FAMILY HISTORY:  Her father had dementia and diabetes. Her mother had  diabetes. MEDICATIONS:  She was on Augmentin one tablet twice a day and Cipro 500  twice a day in the nursing home for possible urinary tract infection and  possible ESBL, Pulmicort 0.5 twice a day via nebulizer, Mucinex 600  twice a day, vancomycin 125 three times a day, and then taper to twice a  day and then daily. Clozaril 50 mg daily, Tylenol 650 q.6 hours p.r.n.,  acyclovir 400 mg twice a day, midodrine 5 mg twice a day as needed,  vitamin D 1000 daily, simethicone 160 three times a day as needed,  sodium bicarb 650 daily, magnesium oxide 400 daily, Lopressor 12.5 twice  a day, Protonix 40 mg daily, Lipitor 10 mg daily, allopurinol 100 mg  daily, Pristiq 50 mg daily, cyclosporin 100 twice a day, CellCept 1000  mg twice a day, prednisone 5 mg daily, levothyroxine 50 mcg daily. PHYSICAL EXAMINATION:  GENERAL:  The patient was seen by Nuclear Medicine and going for a V/Q  scan. She was awake, alert, oriented x3, in no distress. VITAL SIGNS:  Blood pressure 116/97, pulse is 86, respirations 19,  temperature 97.9, oxygenation 96% on 2 liters of oxygen. HEENT:  Pupils are equal, regular, reactive to light. LUNGS:  Scattered expiratory wheezing bilaterally. HEART:  Regular rate and rhythm. No murmur, rub or gallop. ABDOMEN:  Soft, obese, nontender, nondistended. Positive bowel sounds. EXTREMITIES:  No edema. NEUROLOGIC:  No focal motor or sensory deficit. ASSESSMENT AND PLAN:  1.   COPD exacerbation, probably contributing to her hypoxia and we will  place her on DuoNeb nebulizer every 6 hours and will avoid antibiotic if  possible as she has very minimal cough and because of her history of C.  difficile, we will get a V/Q scan to rule out PE since her D-dimer was  slightly elevated. 2.  Acute-on-chronic renal failure. Her creatinine was 2. Her baseline  was 1.5 to 1.6. We will hold her Lasix and monitor her creatinine. History of renal failure, status post renal transplant in 2015. 3.  History of left femoral and popliteal vein DVT. We will resume her  Eliquis for at least six months. She was diagnosed in February. 4.  History of SVT and tachycardia. She is on metoprolol. 5.  Hyperlipidemia. Resume her statin. 6.  Hypothyroidism, resume her Synthroid. 7.  History of DVT in the left to mid and distal femoral vein and  popliteal vein status post IVC filter placed in 02/2022 for possible GI  bleed at that time. 8.  Anemia of chronic disease. 9.  History of C. difficile colitis. 10.  History of ESBL in the urine. We will check urinalysis,  microscopy, and culture and sensitivity.         Lesvia Hyman MD    D: 05/18/2022 18:56:01       T: 05/18/2022 19:05:36     [id number]  Job#: [id number]     Doc#: [id number]    CC:

## 2022-05-19 NOTE — CARE COORDINATION
Ss note: 5/19/2022.10:16 AM Will need neg rapid covid on day of discharge. Pt is from [facility] skilled rehab, per liaison is bedhold and can return upon discharge, NO PRECERT, therapy has been ordered. Follow up visit to room, pt agreeable to return to facility at discharge. (Pt's son Yue Mcelroy is primary decision maker if is confused [phone]).  Keyla Langford, WESLEYW

## 2022-05-19 NOTE — PROGRESS NOTES
Physical Therapy Treatment Note/Plan of Care    Room #:  9382/7746-73  Patient Name: Rita Wilde  YOB: 1955  MRN: [id number]    Date of Service: 5/19/2022     Tentative placement recommendation: Subacute rehab  Equipment recommendation:  To be determined      Evaluating Physical Therapist: Dayna Kaur #[id number]      Specific Provider Orders/Date/Referring Provider :   05/18/22 0900   PT eval and treat Start: 05/18/22 0900, End: 05/18/22 0900, ONE TIME, Standing Count: 1 Occurrences, Giovanni Wilkins MD      Admitting Diagnosis:   Hypoxia [R09.02]  Chronic renal insufficiency, stage 3 (moderate) ([address] [N18.30]  Dyspnea, unspecified type [R06.00]  Acute exacerbation of chronic obstructive pulmonary disease (COPD) ([address] [J44.1]      Surgery: none  Visit Diagnoses       Codes    Dyspnea, unspecified type    -  Primary R06.00    Chronic renal insufficiency, stage 3 (moderate) ([facility])     N18.30          Patient Active Problem List   Diagnosis    Peripheral vascular disease ([address]    Depression    Clotting disorder ([address]    Abnormal EKG    Cancer ([address]    Over weight    Kidney transplanted    ESRD (end stage renal disease) ([address]    Non morbid obesity due to excess calories    Hypertension    Leg swelling    Lymphedema of both lower extremities    Acute gout involving toe of right foot    Sepsis secondary to UTI ([address]    Acute kidney injury superimposed on CKD ([address]    Thyroid disease    Acute on chronic combined systolic (congestive) and diastolic (congestive) heart failure ([address]    Sepsis ([address]    Acute renal failure ([address]    CECILY (acute kidney injury) ([address]    Ischemic bowel disease ([address]    Altered mental status    Coma ([address]    Acute respiratory failure with hypoxia ([address]    Hypoxia    Acute metabolic encephalopathy    Acute deep vein thrombosis (DVT) of femoral vein of left lower extremity (HCC)    Anemia    Positive blood culture    Difficult intravenous access    Paroxysmal atrial fibrillation (HCC)    Hypotension    LBBB (left bundle branch block)    History of DVT (deep vein thrombosis)    Chronic anticoagulation    Acute respiratory failure with hypoxemia ([facility])    Acute exacerbation of chronic obstructive pulmonary disease (COPD) ([facility])        ASSESSMENT of Current Deficits Patient exhibits decreased strength, balance and endurance impairing functional mobility, transfers and gait . Patient's fear of falling and knee buckling are factors limiting her progress with therapy and her gait distance. No knee buckling noted during the 3 short gait distances but she states after each walk \" I need to sit\". The patient will benefit from continued skilled therapy to increase strength and improve balance for safe functional mobility, to decrease risk of falls, and to meet goals at discharge. PHYSICAL THERAPY  PLAN OF CARE       Physical therapy plan of care is established based on physician order,  patient diagnosis and clinical assessment    Current Treatment Recommendations:    -Bed Mobility: Lower extremity exercises   -Sitting Balance: Incorporate reaching activities to activate trunk muscles   -Standing Balance: Perform strengthening exercises in standing to promote motor control with or without upper extremity support   -Transfers: Provide instruction on proper hand and foot position for adequate transfer of weight onto lower extremities and use of gait device if needed and Cues for hand placement, technique and safety. Provide stabilization to prevent fall   -Gait: Gait training and Standing activities to improve: base of support, weight shift, weight bearing    -Endurance: Utilize Supervised activities to increase level of endurance to allow for safe functional mobility including transfers and gait     PT long term treatment goals are located in below grid    Patient and or family understand(s) diagnosis, prognosis, and plan of care.     Frequency of treatments: Patient will be seen  daily.          Prior Level of Function: Patient ambulated with wheeled walker with assist at nursing home  Rehab Potential: good  for baseline    Past medical history:   Past Medical History:   Diagnosis Date    Abnormal EKG     left bundle branch block    Acute exacerbation of chronic obstructive pulmonary disease (COPD) ([facility] 5/19/2022    Acute gout involving toe of right foot 09/03/2020    Acute on chronic combined systolic (congestive) and diastolic (congestive) heart failure (HCC)     Cancer (HCC)     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    Cerebrovascular disease     Clotting disorder ([facility] 1985    thrombophlebitis after c section delivery    Depression     15 years followed by dr Fay Babinski Hemodialysis patient [facility])     History of blood transfusion     Hyperlipidemia     Hypertension     Hypothyroidism     Leg swelling 09/03/2020    Lymphedema of both lower extremities 09/03/2020    Peripheral vascular disease ([facility]     Renal failure 11/2012    renal transplant    Thrombophlebitis     after c section delivery    Thyroid disease      Past Surgical History:   Procedure Laterality Date   [address]    one normal delivery    COLECTOMY N/A 1/15/2022    DIAGNOSTIC  LAPAROSCOPY LYSIS OF ADHESIONS performed by Jesse Brown MD at [address] [place] [address] Drive CATH LAB PROCEDURE  2006    normal coronaries and 1996 normal coronaries    DIALYSIS FISTULA CREATION  march and july 2012    phase one and two patient will start dialysis when needed   [address]    transfemoral venous bypass grafts    IR IVC FILTER PLACEMENT W IMAGING  2/26/2022    IR IVC FILTER PLACEMENT W IMAGING 2/26/2022 [facility] SPECIAL PROCEDURES    KIDNEY TRANSPLANT  2016    KIDNEY TRANSPLANT  2015    KIDNEY TRANSPLANT  2015    PARATHYROIDECTOMY  2006    left parathyroid  implant and parathyroidectomy    TRANSESOPHAGEAL ECHOCARDIOGRAM N/A 2/28/2022    TRANSESOPHAGEAL ECHOCARDIOGRAM WITH BUBBLE STUDY performed by Nelli Márquez MD at [address]:    Precautions:, falls and contact isolation , -c diff, neutropenic precautions   Social history: Pt has had multiple hospitalizations and rehab stays over the last 6 months. Has been requiring assist with ADLs and ambulating with assist using wheeled walker at rehab. Equipment owned: [address] [facility] [address],      [address] [place] [address],[address] Mobility Inpatient   How much difficulty turning over in bed?: A Little  How much difficulty sitting down on / standing up from a chair with arms?: A Little  How much difficulty moving from lying on back to sitting on side of bed?: A Little  How much help from another person moving to and from a bed to a chair?: A Little  How much help from another person needed to walk in hospital room?: A Lot  How much help from another person for climbing 3-5 steps with a railing?: A Lot  AM-PAC Inpatient Mobility Raw Score : 16  AM-PAC Inpatient T-Scale Score : 40.78  Mobility Inpatient CMS 0-100% Score: 54.16  Mobility Inpatient CMS G-Code Modifier : CK    Nursing cleared patient for PT treatment. . Pt has a fear of falling and states her knees have given out on her. OBJECTIVE;   Initial Evaluation  Date: 5/18/2022 Treatment Date:  5/19/2022       Short Term/ Long Term   Goals   Was pt agreeable to Eval/treatment? Yes yes To be met in 4 days   Pain level   0/10  just fatigued per patient  0/10    Bed Mobility    Rolling: Minimal assist of 1    Supine to sit: Minimal assist of 1    Sit to supine: Minimal assist of 1    Scooting: Minimal assist of 1   Rolling: Supervision    Supine to sit: Minimal assist of 1   Sit to supine: Not assessed    Scooting: Minimal assist of 1    Rolling: Independent    Supine to sit:  Independent    Sit to supine: Independent    Scooting: Independent     Transfers Sit to stand: Minimal assist of 1 cues for hand and foot placement  Sit to stand: Minimal assist of 1 Cues for hand placement and safety       Sit to stand: Independent    Ambulation    5 feet using  wheeled walker with Moderate assist of 1   for walker control, balance, upright and safety 10 feet; 8 feet to the restroom, 8 feet using  wheeled walker with Minimal assist of 1   cues for upright posture, increased base of support, increased step length, safety and pacing    35 feet using  wheeled walker with Supervision     ROM Within functional limits    Increase range of motion 10% of affected joints    Strength BUE:  refer to OT eval  RLE:  4-/5  LLE:  4-/5  Increase strength in affected mm groups by 1/3 grade   Balance Sitting EOB:  good -  Dynamic Standing:  fair wheeled walker   Sitting EOB: good   Dynamic Standing: fair wheeled walker   Sitting EOB:  good   Dynamic Standing: good      Patient is Alert & Oriented x person, place, time and situation and follows directions    Sensation:  Patient  denies numbness/tingling   Edema:  no   Endurance: fair     Vitals: room air   Blood Pressure at rest  Blood Pressure during session    Heart Rate at rest  Heart Rate during session    SPO2 at rest %  SPO2 during session %     Patient education  Patient educated on role of Physical Therapy, risks of immobility, safety and plan of care,  importance of mobility while in hospital , ankle pumps, quad set and glut set for edema control, blood clot prevention, importance and purpose of adaptive device and adjusted to proper height for the patient. and safety      Patient response to education:   Pt verbalized understanding Pt demonstrated skill Pt requires further education in this area   Yes Partial Yes      Treatment:  Patient practiced and was instructed/facilitated in the following treatment: Patient assisted to EOB. Sat edge of bed 15 minutes with Supervision  to increase dynamic sitting balance and activity tolerance. Pt stood, ambulated in the room, needed to sit in the chair. Pt rested, performed seated exercises, wanted to brush her teeth in the restroom. Pt stood, ambulated in the restroom, needed to sit, SPTA was guarding her, I brought up the chair to her. Pt sat, brushed her teeth, stood, ambulated back to the chair by the bed. Therapeutic Exercises:  ankle pumps, hip abduction/adduction, long arc quad and seated marching,  x 12 - 15 reps. At end of session, patient in chair with . call light and phone within reach,  all lines and tubes intact, nursing notified. Patient would benefit from continued skilled Physical Therapy to improve functional independence and quality of life. Patient's/ family goals   rehab    Time in 09:50  Time out  10:29    Total Treatment Time  39 minutes         CPT codes:    Therapeutic activities (43626)   29 minutes  2 unit(s)  Therapeutic exercises (50222)   10 minutes  1 unit(s)   Antione Parra  [facility]  LIC # [id number]

## 2022-05-20 ENCOUNTER — APPOINTMENT (OUTPATIENT)
Dept: GENERAL RADIOLOGY | Age: 67
DRG: 191 | End: 2022-05-20
Payer: MEDICARE

## 2022-05-20 LAB
ALBUMIN SERPL-MCNC: 3.1 G/DL (ref 3.5–5.2)
ALP BLD-CCNC: 70 U/L (ref 35–104)
ALT SERPL-CCNC: 9 U/L (ref 0–32)
ANION GAP SERPL CALCULATED.3IONS-SCNC: 12 MMOL/L (ref 7–16)
ANISOCYTOSIS: ABNORMAL
AST SERPL-CCNC: 14 U/L (ref 0–31)
BASOPHILS ABSOLUTE: 0 E9/L (ref 0–0.2)
BASOPHILS RELATIVE PERCENT: 0.1 % (ref 0–2)
BILIRUB SERPL-MCNC: 0.5 MG/DL (ref 0–1.2)
BUN BLDV-MCNC: 31 MG/DL (ref 6–23)
CALCIUM SERPL-MCNC: 10.2 MG/DL (ref 8.6–10.2)
CHLORIDE BLD-SCNC: 98 MMOL/L (ref 98–107)
CO2: 28 MMOL/L (ref 22–29)
CREAT SERPL-MCNC: 1.9 MG/DL (ref 0.5–1)
EOSINOPHILS ABSOLUTE: 0 E9/L (ref 0.05–0.5)
EOSINOPHILS RELATIVE PERCENT: 0 % (ref 0–6)
GFR AFRICAN AMERICAN: 32
GFR NON-AFRICAN AMERICAN: 26 ML/MIN/1.73
GLUCOSE BLD-MCNC: 115 MG/DL (ref 74–99)
HCT VFR BLD CALC: 31.5 % (ref 34–48)
HEMOGLOBIN: 9.6 G/DL (ref 11.5–15.5)
LYMPHOCYTES ABSOLUTE: 0.65 E9/L (ref 1.5–4)
LYMPHOCYTES RELATIVE PERCENT: 7.9 % (ref 20–42)
MCH RBC QN AUTO: 33.4 PG (ref 26–35)
MCHC RBC AUTO-ENTMCNC: 30.5 % (ref 32–34.5)
MCV RBC AUTO: 109.8 FL (ref 80–99.9)
MONOCYTES ABSOLUTE: 0.32 E9/L (ref 0.1–0.95)
MONOCYTES RELATIVE PERCENT: 3.5 % (ref 2–12)
NEUTROPHILS ABSOLUTE: 7.21 E9/L (ref 1.8–7.3)
NEUTROPHILS RELATIVE PERCENT: 88.6 % (ref 43–80)
OVALOCYTES: ABNORMAL
PDW BLD-RTO: 16.7 FL (ref 11.5–15)
PLATELET # BLD: 177 E9/L (ref 130–450)
PMV BLD AUTO: 12.1 FL (ref 7–12)
POIKILOCYTES: ABNORMAL
POTASSIUM SERPL-SCNC: 4.8 MMOL/L (ref 3.5–5)
RBC # BLD: 2.87 E12/L (ref 3.5–5.5)
SODIUM BLD-SCNC: 138 MMOL/L (ref 132–146)
T4 TOTAL: 5.6 MCG/DL (ref 4.5–11.7)
TOTAL PROTEIN: 5.9 G/DL (ref 6.4–8.3)
TSH SERPL DL<=0.05 MIU/L-ACNC: 0.1 UIU/ML (ref 0.27–4.2)
URINE CULTURE, ROUTINE: NORMAL
VITAMIN D 25-HYDROXY: 39 NG/ML (ref 30–100)
WBC # BLD: 8.1 E9/L (ref 4.5–11.5)

## 2022-05-20 PROCEDURE — 1200000000 HC SEMI PRIVATE

## 2022-05-20 PROCEDURE — 36415 COLL VENOUS BLD VENIPUNCTURE: CPT

## 2022-05-20 PROCEDURE — 84443 ASSAY THYROID STIM HORMONE: CPT

## 2022-05-20 PROCEDURE — 97530 THERAPEUTIC ACTIVITIES: CPT

## 2022-05-20 PROCEDURE — 6360000002 HC RX W HCPCS: Performed by: INTERNAL MEDICINE

## 2022-05-20 PROCEDURE — 97535 SELF CARE MNGMENT TRAINING: CPT

## 2022-05-20 PROCEDURE — 85025 COMPLETE CBC W/AUTO DIFF WBC: CPT

## 2022-05-20 PROCEDURE — 82306 VITAMIN D 25 HYDROXY: CPT

## 2022-05-20 PROCEDURE — 6370000000 HC RX 637 (ALT 250 FOR IP): Performed by: INTERNAL MEDICINE

## 2022-05-20 PROCEDURE — 80053 COMPREHEN METABOLIC PANEL: CPT

## 2022-05-20 PROCEDURE — 97110 THERAPEUTIC EXERCISES: CPT

## 2022-05-20 PROCEDURE — 71046 X-RAY EXAM CHEST 2 VIEWS: CPT

## 2022-05-20 PROCEDURE — 84436 ASSAY OF TOTAL THYROXINE: CPT

## 2022-05-20 PROCEDURE — 94640 AIRWAY INHALATION TREATMENT: CPT

## 2022-05-20 PROCEDURE — 2700000000 HC OXYGEN THERAPY PER DAY

## 2022-05-20 RX ORDER — METHYLPREDNISOLONE SODIUM SUCCINATE 40 MG/ML
40 INJECTION, POWDER, LYOPHILIZED, FOR SOLUTION INTRAMUSCULAR; INTRAVENOUS 2 TIMES DAILY
Status: DISCONTINUED | OUTPATIENT
Start: 2022-05-20 | End: 2022-05-20

## 2022-05-20 RX ORDER — METHYLPREDNISOLONE SODIUM SUCCINATE 40 MG/ML
20 INJECTION, POWDER, LYOPHILIZED, FOR SOLUTION INTRAMUSCULAR; INTRAVENOUS 2 TIMES DAILY
Status: DISCONTINUED | OUTPATIENT
Start: 2022-05-20 | End: 2022-05-21

## 2022-05-20 RX ORDER — LANOLIN ALCOHOL/MO/W.PET/CERES
400 CREAM (GRAM) TOPICAL DAILY
Status: DISCONTINUED | OUTPATIENT
Start: 2022-05-21 | End: 2022-05-24 | Stop reason: HOSPADM

## 2022-05-20 RX ADMIN — GUAIFENESIN 600 MG: 600 TABLET, EXTENDED RELEASE ORAL at 09:55

## 2022-05-20 RX ADMIN — CIPROFLOXACIN HYDROCHLORIDE 500 MG: 500 TABLET, FILM COATED ORAL at 09:55

## 2022-05-20 RX ADMIN — IPRATROPIUM BROMIDE AND ALBUTEROL SULFATE 1 AMPULE: .5; 2.5 SOLUTION RESPIRATORY (INHALATION) at 10:26

## 2022-05-20 RX ADMIN — IPRATROPIUM BROMIDE AND ALBUTEROL SULFATE 1 AMPULE: .5; 2.5 SOLUTION RESPIRATORY (INHALATION) at 18:11

## 2022-05-20 RX ADMIN — AMOXICILLIN AND CLAVULANATE POTASSIUM 1 TABLET: 875; 125 TABLET, FILM COATED ORAL at 09:54

## 2022-05-20 RX ADMIN — BUDESONIDE 500 MCG: 0.5 INHALANT RESPIRATORY (INHALATION) at 18:11

## 2022-05-20 RX ADMIN — FOLIC ACID 1 MG: 1 TABLET ORAL at 09:54

## 2022-05-20 RX ADMIN — IPRATROPIUM BROMIDE AND ALBUTEROL SULFATE 1 AMPULE: .5; 2.5 SOLUTION RESPIRATORY (INHALATION) at 14:43

## 2022-05-20 RX ADMIN — ANTI-FUNGAL POWDER MICONAZOLE NITRATE TALC FREE: 1.42 POWDER TOPICAL at 09:58

## 2022-05-20 RX ADMIN — DESVENLAFAXINE 50 MG: 50 TABLET, EXTENDED RELEASE ORAL at 17:29

## 2022-05-20 RX ADMIN — BUDESONIDE 500 MCG: 0.5 INHALANT RESPIRATORY (INHALATION) at 07:32

## 2022-05-20 RX ADMIN — MAGNESIUM OXIDE 400 MG: 400 TABLET ORAL at 09:54

## 2022-05-20 RX ADMIN — ACYCLOVIR 400 MG: 400 TABLET ORAL at 09:55

## 2022-05-20 RX ADMIN — SODIUM BICARBONATE 650 MG: 650 TABLET ORAL at 09:55

## 2022-05-20 RX ADMIN — METHYLPREDNISOLONE SODIUM SUCCINATE 20 MG: 40 INJECTION, POWDER, FOR SOLUTION INTRAMUSCULAR; INTRAVENOUS at 15:55

## 2022-05-20 RX ADMIN — Medication 1000 UNITS: at 09:55

## 2022-05-20 RX ADMIN — APIXABAN 5 MG: 5 TABLET, FILM COATED ORAL at 09:53

## 2022-05-20 RX ADMIN — CYCLOSPORINE 100 MG: 25 CAPSULE, GELATIN COATED ORAL at 09:53

## 2022-05-20 RX ADMIN — IPRATROPIUM BROMIDE AND ALBUTEROL SULFATE 1 AMPULE: .5; 2.5 SOLUTION RESPIRATORY (INHALATION) at 07:32

## 2022-05-20 RX ADMIN — PANTOPRAZOLE SODIUM 40 MG: 40 TABLET, DELAYED RELEASE ORAL at 05:56

## 2022-05-20 RX ADMIN — METOPROLOL TARTRATE 12.5 MG: 25 TABLET, FILM COATED ORAL at 09:53

## 2022-05-20 RX ADMIN — Medication 125 MG: at 15:16

## 2022-05-20 RX ADMIN — ALLOPURINOL 100 MG: 100 TABLET ORAL at 09:54

## 2022-05-20 RX ADMIN — LEVOTHYROXINE SODIUM 50 MCG: 0.05 TABLET ORAL at 09:55

## 2022-05-20 RX ADMIN — MYCOPHENOLATE MOFETIL 1000 MG: 250 CAPSULE ORAL at 09:53

## 2022-05-20 RX ADMIN — Medication 125 MG: at 10:00

## 2022-05-20 NOTE — PROGRESS NOTES
Pulmonary/Critical Care Progress Note    We are following patient for COPD exacerbation, history of stroke, status post renal transplant, peripheral vascular disease, deep vein thrombosis, status post IVC filter placement, systemic hypertension, bipolar disorder    SUBJECTIVE:  Patient's breathing has improved considerably. She is much less short of breath than she was on admission. However, she felt that her legs were \"buckling\" when she tried to walk earlier today, slightly improved now. This could certainly be secondary to steroid myopathy since she has been on steroids for a long time both acutely and previously for her renal transplant. I reviewed today's chest x-ray and it is within normal limits. MEDICATIONS:   methylPREDNISolone  40 mg IntraVENous BID    amoxicillin-clavulanate  1 tablet Oral 2 times per day    ciprofloxacin  500 mg Oral Daily    ipratropium-albuterol  1 ampule Inhalation 4x Daily    acyclovir  400 mg Oral BID    allopurinol  100 mg Oral Daily    atorvastatin  10 mg Oral Nightly    budesonide  500 mcg Nebulization BID    cloZAPine  50 mg Oral Nightly    cycloSPORINE  100 mg Oral BID    desvenlafaxine succinate  50 mg Oral QPM    folic acid  1 mg Oral Daily    guaiFENesin  600 mg Oral BID    levothyroxine  50 mcg Oral Daily    magnesium oxide  400 mg Oral Daily    metoprolol tartrate  12.5 mg Oral BID    miconazole   Topical BID    pantoprazole  40 mg Oral QAM AC    sodium bicarbonate  650 mg Oral Daily    vancomycin  125 mg Oral TID    Vitamin D  1,000 Units Oral Daily    apixaban  5 mg Oral BID    mycophenolate  1,000 mg Oral BID       acetaminophen, diphenhydrAMINE, midodrine, simethicone      REVIEW OF SYSTEMS:  Constitutional: Denies fever, weight loss, night sweats, and fatigue  Skin: Denies pigmentation, dark lesions, and rashes   HEENT: Denies hearing loss, tinnitus, ear drainage, epistaxis, sore throat, and hoarseness.   Cardiovascular: Denies palpitations, chest pain, and chest pressure. Respiratory: Denies cough, dyspnea at rest, hemoptysis, apnea, and choking. Gastrointestinal: Denies nausea, vomiting, poor appetite, diarrhea, heartburn or reflux  Genitourinary: Denies dysuria, frequency, urgency or hematuria  Musculoskeletal: Denies myalgias, muscle weakness, and bone pain  Neurological: Denies dizziness, vertigo, headache, and focal weakness  Psychological: Denies anxiety and depression  Endocrine: Denies heat intolerance and cold intolerance  Hematopoietic/Lymphatic: Denies bleeding problems and blood transfusions    OBJECTIVE:  Vitals:    05/20/22 1027   BP:    Pulse:    Resp: 16   Temp:    SpO2: 93%        O2 Flow Rate (L/min): 0.5 L/min  O2 Device: Nasal cannula    PHYSICAL EXAM:  Constitutional: No fever, chills, diaphoresis  Skin: No skin rash, no skin breakdown  HEENT: Unremarkable  Neck: No JVD, lymphadenopathy, thyromegaly  Cardiovascular: S1, S2 regular. No S3 murmurs rubs present  Respiratory: Clear to auscultation bilaterally  Gastrointestinal: Soft, obese, nontender  Genitourinary: No CVA tenderness  Extremities: No clubbing, cyanosis, or edema  Neurological: Awake, alert, oriented x3. No evidence of focal motor or sensory deficits  Psychological: Appropriate affect    LABS:  WBC   Date Value Ref Range Status   05/20/2022 8.1 4.5 - 11.5 E9/L Final   05/18/2022 4.4 (L) 4.5 - 11.5 E9/L Final   05/17/2022 SEE BELOW (AA) 4.5 - 11.5 E9/L Corrected     Comment:     DUPLICATE ORDER.   Corrected result; previously reported as 6.4 on 05/17/2022 at 13:25 by ELISEO     05/17/2022 6.6 4.5 - 11.5 E9/L Final     Hemoglobin   Date Value Ref Range Status   05/20/2022 9.6 (L) 11.5 - 15.5 g/dL Final   05/18/2022 10.8 (L) 11.5 - 15.5 g/dL Final   05/17/2022 SEE BELOW (AA) 11.5 - 15.5 g/dL Corrected     Comment:     Corrected result; previously reported as 10.4 on 05/17/2022 at 13:25 by ELISEO   05/17/2022 10.4 (L) 11.5 - 15.5 g/dL Final     Hematocrit Date Value Ref Range Status   05/20/2022 31.5 (L) 34.0 - 48.0 % Final   05/18/2022 37.5 34.0 - 48.0 % Final   05/17/2022 SEE BELOW (AA) 34.0 - 48.0 % Corrected     Comment:     Corrected result; previously reported as 35.0 on 05/17/2022 at 13:25 by TARLA   05/17/2022 35.1 34.0 - 48.0 % Final     MCV   Date Value Ref Range Status   05/20/2022 109.8 (H) 80.0 - 99.9 fL Final   05/18/2022 112.3 (H) 80.0 - 99.9 fL Final   05/17/2022 SEE BELOW (AA) 80.0 - 99.9 fL Corrected     Comment:     Corrected result; previously reported as 113.3 on 05/17/2022 at 13:25 by TARLA   05/17/2022 111.4 (H) 80.0 - 99.9 fL Final     Platelets   Date Value Ref Range Status   05/20/2022 177 130 - 450 E9/L Final   05/18/2022 172 130 - 450 E9/L Final   05/17/2022 SEE BELOW (AA) 130 - 450 E9/L Corrected     Comment:     Corrected result; previously reported as 172 on 05/17/2022 at 13:25 by TARLA   05/17/2022 174 130 - 450 E9/L Final     Sodium   Date Value Ref Range Status   05/20/2022 138 132 - 146 mmol/L Final   05/19/2022 142 132 - 146 mmol/L Final   05/18/2022 144 132 - 146 mmol/L Final     Potassium   Date Value Ref Range Status   05/20/2022 4.8 3.5 - 5.0 mmol/L Final   05/19/2022 4.3 3.5 - 5.0 mmol/L Final   05/18/2022 4.2 3.5 - 5.0 mmol/L Final     Potassium reflex Magnesium   Date Value Ref Range Status   04/21/2022 4.3 3.5 - 5.0 mmol/L Final   03/09/2022 4.7 3.5 - 5.0 mmol/L Final   02/26/2022 4.7 3.5 - 5.0 mmol/L Final     Comment:     Specimen is moderately Hemolyzed. Result may be artificially increased.      Chloride   Date Value Ref Range Status   05/20/2022 98 98 - 107 mmol/L Final   05/19/2022 100 98 - 107 mmol/L Final   05/18/2022 102 98 - 107 mmol/L Final     CO2   Date Value Ref Range Status   05/20/2022 28 22 - 29 mmol/L Final   05/19/2022 31 (H) 22 - 29 mmol/L Final   05/18/2022 29 22 - 29 mmol/L Final     BUN   Date Value Ref Range Status   05/20/2022 31 (H) 6 - 23 mg/dL Final   05/19/2022 24 (H) 6 - 23 mg/dL Final 05/18/2022 22 6 - 23 mg/dL Final     CREATININE   Date Value Ref Range Status   05/20/2022 1.9 (H) 0.5 - 1.0 mg/dL Final   05/19/2022 1.8 (H) 0.5 - 1.0 mg/dL Final   05/18/2022 1.9 (H) 0.5 - 1.0 mg/dL Final     Glucose   Date Value Ref Range Status   05/20/2022 115 (H) 74 - 99 mg/dL Final   05/19/2022 94 74 - 99 mg/dL Final   05/18/2022 110 (H) 74 - 99 mg/dL Final     Calcium   Date Value Ref Range Status   05/20/2022 10.2 8.6 - 10.2 mg/dL Final   05/19/2022 10.1 8.6 - 10.2 mg/dL Final   05/18/2022 10.7 (H) 8.6 - 10.2 mg/dL Final     Total Protein   Date Value Ref Range Status   05/20/2022 5.9 (L) 6.4 - 8.3 g/dL Final   05/19/2022 5.7 (L) 6.4 - 8.3 g/dL Final   05/18/2022 6.4 6.4 - 8.3 g/dL Final     Albumin   Date Value Ref Range Status   05/20/2022 3.1 (L) 3.5 - 5.2 g/dL Final   05/19/2022 3.1 (L) 3.5 - 5.2 g/dL Final   05/18/2022 3.4 (L) 3.5 - 5.2 g/dL Final     Total Bilirubin   Date Value Ref Range Status   05/20/2022 0.5 0.0 - 1.2 mg/dL Final   05/19/2022 0.4 0.0 - 1.2 mg/dL Final   05/18/2022 0.5 0.0 - 1.2 mg/dL Final     Alkaline Phosphatase   Date Value Ref Range Status   05/20/2022 70 35 - 104 U/L Final   05/19/2022 73 35 - 104 U/L Final   05/18/2022 80 35 - 104 U/L Final     AST   Date Value Ref Range Status   05/20/2022 14 0 - 31 U/L Final   05/19/2022 14 0 - 31 U/L Final   05/18/2022 13 0 - 31 U/L Final     ALT   Date Value Ref Range Status   05/20/2022 9 0 - 32 U/L Final   05/19/2022 10 0 - 32 U/L Final   05/18/2022 8 0 - 32 U/L Final     GFR Non-   Date Value Ref Range Status   05/20/2022 26 >=60 mL/min/1.73 Final     Comment:     Chronic Kidney Disease: less than 60 ml/min/1.73 sq.m. Kidney Failure: less than 15 ml/min/1.73 sq.m. Results valid for patients 18 years and older. 05/19/2022 28 >=60 mL/min/1.73 Final     Comment:     Chronic Kidney Disease: less than 60 ml/min/1.73 sq.m. Kidney Failure: less than 15 ml/min/1.73 sq.m.   Results valid for patients 18 years and older. 05/18/2022 26 >=60 mL/min/1.73 Final     Comment:     Chronic Kidney Disease: less than 60 ml/min/1.73 sq.m. Kidney Failure: less than 15 ml/min/1.73 sq.m. Results valid for patients 18 years and older. GFR    Date Value Ref Range Status   05/20/2022 32  Final   05/19/2022 34  Final   05/18/2022 32  Final     Magnesium   Date Value Ref Range Status   05/17/2022 1.9 1.6 - 2.6 mg/dL Final   04/26/2022 2.1 1.6 - 2.6 mg/dL Final   04/25/2022 1.3 (L) 1.6 - 2.6 mg/dL Final     Phosphorus   Date Value Ref Range Status   03/16/2022 3.0 2.5 - 4.5 mg/dL Final   03/15/2022 3.4 2.5 - 4.5 mg/dL Final   03/13/2022 4.6 (H) 2.5 - 4.5 mg/dL Final     No results for input(s): PH, PO2, PCO2, HCO3, BE, O2SAT in the last 72 hours. RADIOLOGY:  XR CHEST (2 VW)   Final Result   No acute process. Stable cardiomegaly. NM LUNG VENT/PERFUSION (VQ)   Final Result   Very low probability for pulmonary embolism. US DUP LOWER EXTREMITIES BILATERAL VENOUS   Final Result   No evidence of DVT in either lower extremity. XR CHEST PORTABLE   Final Result   1. Cardiomegaly. There are no findings of failure or pneumonia. PROBLEM LIST:  Principal Problem:    Hypoxia  Active Problems:    Acute exacerbation of chronic obstructive pulmonary disease (COPD) ([facility] [address]  Resolved Problems:    * No resolved hospital problems. *      IMPRESSION:  1. COPD exacerbation, improved  2. Status post renal transplant  3. 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 Systemic hypertension  5. History of stroke  6. History of DVT with thromboembolic disease previously    PLAN:  1. Decrease steroids  2. Continue inhaled bronchodilators  3. May ambulate slowly and without oxygen but only for short distances until her legs feel stronger. 4. Labs in a.m.     .  Electronically signed by Carl Lopez MD on 5/20/2022 at 3:26 PM

## 2022-05-20 NOTE — PROGRESS NOTES
Progress Note  Date:2022       Room:[facility]-01  Patient Inga Coyle     YOB: 1955     Age:67 y.o. Subjective    Subjective:  Symptoms:  Stable. Diet:  Adequate intake. Activity level: Impaired due to weakness. Pain:  She reports no pain. Review of Systems  Objective         Vitals Last 24 Hours:  TEMPERATURE:  Temp  Av °F (36.7 °C)  Min: 97.6 °F (36.4 °C)  Max: 98.3 °F (36.8 °C)  RESPIRATIONS RANGE: Resp  Av.7  Min: 16  Max: 18  PULSE OXIMETRY RANGE: SpO2  Av.3 %  Min: 89 %  Max: 93 %  PULSE RANGE: Pulse  Av.7  Min: 84  Max: 94  BLOOD PRESSURE RANGE: Systolic (31MOA), HED:695 , Min:133 , AOQ:974   ; Diastolic (18XPN), SIVAKUMAR:97, Min:69, Max:89    I/O (24Hr): Intake/Output Summary (Last 24 hours) at 2022 1440  Last data filed at 2022 1027  Gross per 24 hour   Intake 240 ml   Output 550 ml   Net -310 ml     Objective:  General Appearance:  Comfortable. Vital signs: (most recent): Blood pressure (!) 151/89, pulse 84, temperature 97.6 °F (36.4 °C), temperature source Oral, resp. rate 16, height 5' 2\" (1.575 m), weight 200 lb (90.7 kg), SpO2 93 %. Vital signs are normal.    Output: Producing urine. HEENT: Normal HEENT exam.    Lungs:  (Scattered  expiratory wheezing bilateral  )  Heart: Normal rate. Regular rhythm. S1 normal.  Positive for murmur. Abdomen: Abdomen is soft and distended. Bowel sounds are normal.   There is no abdominal tenderness. Extremities: There is no dependent edema. Pulses: Distal pulses are intact. Neurological: Patient is alert and oriented to person, place and time.       Labs/Imaging/Diagnostics    Labs:  CBC:  Recent Labs     22  1028 22  0819   WBC 4.4* 8.1   RBC 3.34* 2.87*   HGB 10.8* 9.6*   HCT 37.5 31.5*   .3* 109.8*   RDW 16.8* 16.7*    177     CHEMISTRIES:  Recent Labs     22  1028 22  0858 22  0819    142 138   K 4.2 4.3 4.8   CL 102 100 98   CO2 29 31* 28   BUN 22 24* 31*   CREATININE 1.9* 1.8* 1.9*   GLUCOSE 110* 94 115*     PT/INR:  Recent Labs     05/18/22  1028   PROTIME 13.1*   INR 1.1     APTT:  Recent Labs     05/18/22  1028   APTT 31.7     LIVER PROFILE:  Recent Labs     05/18/22  1028 05/19/22  0858 05/20/22  0819   AST 13 14 14   ALT 8 10 9   BILITOT 0.5 0.4 0.5   ALKPHOS 80 73 70       Imaging Last 24 Hours:  NM LUNG VENT/PERFUSION (VQ)    Result Date: 5/18/2022  EXAMINATION: NUCLEAR MEDICINE VENTILATION PERFUSION SCAN. 5/18/2022 TECHNIQUE: 83.4 millicuries aerosolized Tc99m DTPA was administered via mask prior to planar imaging of the lungs in multiple projections. Then, 5.4 millicuries of Tc 16C MAA was administered intravenously prior to planar imaging of the lungs in similar projections. COMPARISON: Chest radiograph 05/17/2022. HISTORY: ORDERING SYSTEM PROVIDED HISTORY: R/O PE TECHNOLOGIST PROVIDED HISTORY: Reason for exam:->R/O PE What reading provider will be dictating this exam?->CRC FINDINGS: PERFUSION: Mildly heterogeneous distribution of radiotracer. The perfusion is overall improved compared to the ventilation. No unmatched perfusion defects. VENTILATION: Heterogeneous distribution of radiotracer. CHEST RADIOGRAPH: No focal areas of consolidation or significant effusions on recent chest radiograph. Very low probability for pulmonary embolism. Assessment//Plan           Hospital Problems           Last Modified POA    * (Principal) Hypoxia 5/17/2022 Yes    Acute exacerbation of chronic obstructive pulmonary disease (COPD) ([facility] [address] 5/19/2022 Yes        Assessment:    Condition: In stable condition. (COPD exacerbation , increase solumedrol 40 mg iv  Bid , continue aerosol treatment , check cxr   V/q scan low probability . Acute on chronic renal failure , creatinine slightly better ,  Lasix on hold  for now ,     H/o DVT 2/2022 on eliquis till end of august , s/p IVC filter .      ESBL in the urine on augmentin and cipro on the urine done in the NH suppose to have it for 2 weeks , 1 more week left     Anemia of chronic disease . Hypothyroidism , continue synthroid.     h/o c diff colitis  On vancomycin tapering dose   Discharge when respiratory status better       ).        Electronically signed by Lily Jaramillo MD on 5/19/22 at 7:40 PM EDT

## 2022-05-20 NOTE — PLAN OF CARE
Problem: Skin/Tissue Integrity  Goal: Absence of new skin breakdown  Description: 1. Monitor for areas of redness and/or skin breakdown  2. Assess vascular access sites hourly  3. Every 4-6 hours minimum:  Change oxygen saturation probe site  4. Every 4-6 hours:  If on nasal continuous positive airway pressure, respiratory therapy assess nares and determine need for appliance change or resting period.   Outcome: Progressing     Problem: ABCDS Injury Assessment  Goal: Absence of physical injury  Outcome: Progressing     Problem: Pain  Goal: Verbalizes/displays adequate comfort level or baseline comfort level  Outcome: Progressing     Problem: Chronic Conditions and Co-morbidities  Goal: Patient's chronic conditions and co-morbidity symptoms are monitored and maintained or improved  Outcome: Progressing

## 2022-05-20 NOTE — PROGRESS NOTES
OCCUPATIONAL THERAPY BEDSIDE TREATMENT NOTE   [facility]  Abdiaziz Rocha. [place]     Date:2022  Patient Name: Prince Cortés  MRN: [id number]  : 1955  Room: [address] [place] [address]         Evaluating OT: Dana Huang OTR/L #[id number]      Referring Provider and Specific Provider Orders/Date:      22   OT eval and treat  Start:  22,   End:  22,   ONE TIME,   Standing Count:  1 Occurrences,   R         Taurus Armijo MD       Placement Recommendation: Subacute Rehab         Diagnosis:   1. Dyspnea, unspecified type    2.  Chronic renal insufficiency, stage 3 (moderate) (HCC)         Surgery: None       Pertinent Medical History:       Past Medical History        Past Medical History:   Diagnosis Date    Abnormal EKG       left bundle branch block    Acute gout involving toe of right foot 2020    Acute on chronic combined systolic (congestive) and diastolic (congestive) heart failure (HCC)      Cancer (HCC)       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    Cerebrovascular disease      Clotting disorder ([address] 1985     thrombophlebitis after c section delivery    Depression       15 years followed by dr Magdalene Song Hemodialysis patient [facility])      History of blood transfusion      Hyperlipidemia      Hypertension      Hypothyroidism      Leg swelling 2020    Lymphedema of both lower extremities 2020    Peripheral vascular disease ([facility] [address]      Renal failure 2012     renal transplant    Thrombophlebitis       after c section delivery    Thyroid disease              Past Surgical History         Past Surgical History:   Procedure Laterality Date     SECTION   1985     one normal delivery    COLECTOMY N/A 1/15/2022     DIAGNOSTIC  LAPAROSCOPY LYSIS OF ADHESIONS performed by Farzad Riggs MD at [address] [facility] CATH LAB PROCEDURE   2006     normal coronaries and 1996 normal coronaries    DIALYSIS FISTULA CREATION   march and july 2012     phase one and two patient will start dialysis when needed   [place]     transfemoral venous bypass grafts    IR IVC FILTER PLACEMENT W IMAGING   2/26/2022     IR IVC FILTER PLACEMENT W IMAGING 2/26/2022 [id number] SPECIAL PROCEDURES    KIDNEY TRANSPLANT   2016    KIDNEY TRANSPLANT   2015    KIDNEY TRANSPLANT   2015    PARATHYROIDECTOMY   2006     left parathyroid  implant and parathyroidectomy    TRANSESOPHAGEAL ECHOCARDIOGRAM N/A 2/28/2022     TRANSESOPHAGEAL ECHOCARDIOGRAM WITH BUBBLE STUDY performed by Keenan Martínez MD at [address]      Precautions:  Fall Risk, falls and alarm, O2, contact isolation, C-diff infection, neutropenic precautions.       Assessment of current deficits    [x]? Functional mobility            [x]?ADLs           [x]? Strength                   [x]? Cognition    [x]? Functional transfers          [x]? IADLs         [x]? Safety Awareness   [x]? Endurance    []? Fine Coordination              [x]? Balance      []? Vision/perception    []? Sensation      []? Gross Motor Coordination  []? ROM           []?  Delirium                   []? Motor Control      OT PLAN OF CARE   OT POC based on physician orders, patient diagnosis and results of clinical assessment     Frequency/Duration 1-3 days/wk for 2 weeks PRN      Specific OT Treatment Interventions to include:   * Instruction/training on adapted ADL techniques and AE recommendations to increase functional independence within precautions       * Training on energy conservation strategies, correct breathing pattern and techniques to improve independence/tolerance for self-care routine  * Functional transfer/mobility training/DME recommendations for increased independence, safety, and fall prevention  * Patient/Family education to increase follow through with safety techniques and functional independence  * Recommendation of environmental modifications for increased safety with functional transfers/mobility and ADLs  * Cognitive retraining/development of therapeutic activities to improve problem solving, judgement, memory, and attention for increased safety/participation in ADL/IADL tasks  * Therapeutic exercise to improve motor endurance, ROM, and functional strength for ADLs/functional transfers  * Therapeutic activities to facilitate/challenge dynamic balance, stand tolerance for increased safety and independence with ADLs  * Therapeutic activities to facilitate gross/fine motor skills for increased independence with ADLs  * Positioning to improve skin integrity, interaction with environment and functional independence  * Delirium prevention/treatment     Recommended Adaptive Equipment: TBD      Home Living: Pt presented from rehab at [facility] Level of Function: Pt has had multiple hospitalizations and rehab stays over the last 6 months.  Has been requiring assist with ADLs and ambulating with assist using wheeled walker at rehab.      Pain Level: Pt denied pain; Nursing notified.                 Cognition: A&O: 4/4 - Follows 3 step directions               Memory: impaired              Sequencing: poor              Problem solving: poor              Judgement/safety: poor     AMPAC   How much help for putting on and taking off regular lower body clothing?: Total  How much help for Bathing?: A Lot  How much help for Toileting?: Total  How much help for putting on and taking off regular upper body clothing?: A Little  How much help for taking care of personal grooming?: A Little  How much help for eating meals?: A Little  AM-[facility] Inpatient Daily Activity Raw Score: 13  AM-P[facility] Inpatient ADL T-Scale Score : 32.03  ADL Inpatient CMS 0-100% Score: 63.03  ADL Inpatient CMS G-Code Modifier : CL                Functional Assessment:     Initial Eval Status  Date: 5/18/22    Treatment Status  Date: 5/20/22 STGs = LTGs  Time frame: 10-14 days   Feeding Supervision      N/T  Independent    Grooming Moderate Assist      SBA/Set-up for oral hygiene seated at table-top as well has hair wash with shampoo cap and face wash. Supervision    UB Dressing Moderate Assist    Minimal Assist for gown mgmt; assist to tie lace around neck.     Supervision    LB Dressing Dependent     N/T  Minimal Assist    Bathing Moderate/Maximal Assist     Minimal Assist for UB sponge bath while seated in chair. Pt declined LB bathing however Mod/Max A suspected. Minimal Assist    Toileting Dependent      Dependent for rear hygiene while standing supported at wheeled walker. Pt has hassan catheter. Minimal Assist    Bed Mobility  Supine to sit: Minimal Assist   Sit to supine: Supervision         Supervision supine to sit. Verbal cues for overall safety/technique. Supine to sit: Independent   Sit to supine: Independent    Functional Transfers Sit to stand: Minimal Assist   Stand to sit: Minimal Assist      Transfer training with verbal cues for hand placement to improve safety.      Minimal Assist from EOB to wheeled walker. Sit <> stand at chair x 3 reps with Minimal Assist.   Verbal cues for hand placement for improved safety. Good return demo. Independent   Functional Mobility Minimal Assist with wheeled walker to improve balance 2-3 side steps along side of bed, verbal cues for walker sequence and safety.      Minimal Assist with wheeled walker short distances at bedside. Verbal cues for walker sequence and safety. Moderate [place] with use of wheeled walker    Balance Sitting:     Static: fair plus    Dynamic: fair   Standing: fair/ fair minus      Fair plus/good sitting balance. Fair standing balance with x 1 episode of posterior loss of balance while standing at walker. Min A to correct and prevent fall.     Sitting:     Static: good    Dynamic: good  Standing: good    Activity Tolerance fair  / fair minus; standing thorough review of current medical information, gathering information on past medical history/social history and prior level of function, completion of standardized testing/informal observation of tasks, assessment of data, and development of POC/Goals.     Time In: 2:05 PM   Time Out: 2:30 PM                  Min Units   OT Eval Low 81048     OT Eval Medium 82851     OT Eval High 28590     OT Re-Eval 85789          ADL/Self Care 85125 15 1   Therapeutic Activities 63621 10 1   Therapeutic Ex 94905     Orthotic Management 25547     Neuro Re-Ed 62128     Non-Billable Time     TOTAL TIMED TREATMENT 25 2     Yulissa Hobson OTR/L #[id number]

## 2022-05-20 NOTE — PROGRESS NOTES
Physical Therapy Treatment Note/Plan of Care    Room #:  5545/1123-87  Patient Name: Jose Alanis  YOB: 1955  MRN: [id number]    Date of Service: 5/20/2022     Tentative placement recommendation: Subacute rehab  Equipment recommendation:  To be determined      Evaluating Physical Therapist: Dayna Zacarias #[id number]      Specific Provider Orders/Date/Referring Provider :   05/18/22 0900   PT eval and treat Start: 05/18/22 0900, End: 05/18/22 0900, ONE TIME, Standing Count: 1 Occurrences, Nga Rico MD      Admitting Diagnosis:   Hypoxia [R09.02]  Chronic renal insufficiency, stage 3 (moderate) ([address] [N18.30]  Dyspnea, unspecified type [R06.00]  Acute exacerbation of chronic obstructive pulmonary disease (COPD) ([address] [J44.1]      Surgery: none  Visit Diagnoses       Codes    Dyspnea, unspecified type    -  Primary R06.00    Chronic renal insufficiency, stage 3 (moderate) (HCC)     N18.30          Patient Active Problem List   Diagnosis    Peripheral vascular disease ([address]    Depression    Clotting disorder ([address]    Abnormal EKG    Cancer ([address]    Over weight    Kidney transplanted    ESRD (end stage renal disease) ([address]    Non morbid obesity due to excess calories    Hypertension    Leg swelling    Lymphedema of both lower extremities    Acute gout involving toe of right foot    Sepsis secondary to UTI ([address]    Acute kidney injury superimposed on CKD ([address]    Thyroid disease    Acute on chronic combined systolic (congestive) and diastolic (congestive) heart failure ([address]    Sepsis ([address]    Acute renal failure ([address]    CECILY (acute kidney injury) ([address]    Ischemic bowel disease ([address]    Altered mental status    Coma ([address]    Acute respiratory failure with hypoxia ([address]    Hypoxia    Acute metabolic encephalopathy    Acute deep vein thrombosis (DVT) of femoral vein of left lower extremity (HCC)    Anemia    Positive blood culture    Difficult intravenous access    Paroxysmal atrial fibrillation (HCC)    Hypotension    LBBB (left bundle branch block)    History of DVT (deep vein thrombosis)    Chronic anticoagulation    Acute respiratory failure with hypoxemia [facility])    Acute exacerbation of chronic obstructive pulmonary disease (COPD) ([facility])        ASSESSMENT of Current Deficits Patient exhibits decreased strength, balance and endurance impairing functional mobility, transfers and gait . Patient's fear of falling and knee buckling are factors limiting her progress with therapy and her gait distance. Pt with B knee buckling during ambulation that required moderate assist to stabilize and get her back to the chair safely. Pt only able to stand for 1 minute to 1 minute and 15 seconds with standing endurance training due to impaired strength and endurance with B LE's. The patient will benefit from continued skilled therapy to increase strength and improve balance for safe functional mobility, to decrease risk of falls, and to meet goals at discharge. PHYSICAL THERAPY  PLAN OF CARE       Physical therapy plan of care is established based on physician order,  patient diagnosis and clinical assessment    Current Treatment Recommendations:    -Bed Mobility: Lower extremity exercises   -Sitting Balance: Incorporate reaching activities to activate trunk muscles   -Standing Balance: Perform strengthening exercises in standing to promote motor control with or without upper extremity support   -Transfers: Provide instruction on proper hand and foot position for adequate transfer of weight onto lower extremities and use of gait device if needed and Cues for hand placement, technique and safety.  Provide stabilization to prevent fall   -Gait: Gait training and Standing activities to improve: base of support, weight shift, weight bearing    -Endurance: Utilize Supervised activities to increase level of endurance to allow for safe functional mobility including transfers and gait     PT long term treatment goals are located in below grid    Patient and or family understand(s) diagnosis, prognosis, and plan of care. Frequency of treatments: Patient will be seen  daily.          Prior Level of Function: Patient ambulated with wheeled walker with assist at nursing home  Rehab Potential: good  for baseline    Past medical history:   Past Medical History:   Diagnosis Date    Abnormal EKG     left bundle branch block    Acute exacerbation of chronic obstructive pulmonary disease (COPD) ([facility] 5/19/2022    Acute gout involving toe of right foot 09/03/2020    Acute on chronic combined systolic (congestive) and diastolic (congestive) heart failure (HCC)     Cancer (HCC)     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    Cerebrovascular disease     Clotting disorder ([facility] 1985    thrombophlebitis after c section delivery    Depression     15 years followed by dr Dora Cook Hemod[facility] patient [facility])     History of blood transfusion     Hyperlipidemia     Hypertension     Hypothyroidism     Leg swelling 09/03/2020    Lymphedema of both lower extremities 09/03/2020    Peripheral vascular disease ([facility]     Renal failure 11/2012    renal transplant    Thrombophlebitis     after c section delivery    Thyroid disease      Past Surgical History:   Procedure Laterality Date   [address]    one normal delivery    COLECTOMY N/A 1/15/2022    DIAGNOSTIC  LAPAROSCOPY LYSIS OF ADHESIONS performed by Kayley Cordova MD at [address] [place] [address] Drive CATH LAB PROCEDURE  2006    normal coronaries and 1996 normal coronaries    DIALYSIS FISTULA CREATION  march and july 2012    phase one and two patient will start dialysis when needed   [address]    transfemoral venous bypass grafts    IR IVC FILTER PLACEMENT W IMAGING  2/26/2022    IR IVC FILTER PLACEMENT W IMAGING 2/26/2022 [facility] SPECIAL PROCEDURES    KIDNEY TRANSPLANT  2016    KIDNEY TRANSPLANT  2015    KIDNEY TRANSPLANT  2015    PARATHYROIDECTOMY  2006    left parathyroid  implant and parathyroidectomy    TRANSESOPHAGEAL ECHOCARDIOGRAM N/A 2/28/2022    TRANSESOPHAGEAL ECHOCARDIOGRAM WITH BUBBLE STUDY performed by Marco Gomes MD at [address] [facility]:    Precautions:, falls and contact isolation , -c diff, neutropenic precautions   Social history: Pt has had multiple hospitalizations and rehab stays over the last 6 months. Has been requiring assist with ADLs and ambulating with assist using wheeled walker at rehab. Equipment owned: Horace Ham,      [address] [place] [address],[address] Mobility Inpatient   How much difficulty turning over in bed?: A Little  How much difficulty sitting down on / standing up from a chair with arms?: A Little  How much difficulty moving from lying on back to sitting on side of bed?: A Little  How much help from another person moving to and from a bed to a chair?: A Little  How much help from another person needed to walk in hospital room?: A Lot  How much help from another person for climbing 3-5 steps with a railing?: A Lot  AM-PAC Inpatient Mobility Raw Score : 16  AM-PAC Inpatient T-Scale Score : 40.78  Mobility Inpatient CMS 0-100% Score: 54.16  Mobility Inpatient CMS G-Code Modifier : CK    Nursing cleared patient for PT treatment. . Pt has a fear of falling and states her knees have given out on her. OBJECTIVE;   Initial Evaluation  Date: 5/18/2022 Treatment Date:  5/20/2022       Short Term/ Long Term   Goals   Was pt agreeable to Eval/treatment?  Yes yes To be met in 4 days   Pain level   0/10  just fatigued per patient  0/10    Bed Mobility    Rolling: Minimal assist of 1    Supine to sit: Minimal assist of 1    Sit to supine: Minimal assist of 1    Scooting: Minimal assist of 1   Rolling: Not assessed patient in chair   Supine to sit: Not assessed patient in chair   Sit to supine: Not assessed patient in chair Scooting: Not assessed patient in chair    Rolling: Independent    Supine to sit: Independent    Sit to supine: Independent    Scooting: Independent     Transfers Sit to stand: Minimal assist of 1 cues for hand and foot placement  Sit to stand: Minimal assist of 1 Cues for hand placement and safety       Sit to stand: Independent    Ambulation    5 feet using  wheeled walker with Moderate assist of 1   for walker control, balance, upright and safety 10  feet using  wheeled walker with Moderate assist of 1   cues for upright posture, increased base of support, increased step length, safety, pacing and bilateral knee extension in stance phase of gait    35 feet using  wheeled walker with Supervision     ROM Within functional limits    Increase range of motion 10% of affected joints    Strength BUE:  refer to OT eval  RLE:  4-/5  LLE:  4-/5  Increase strength in affected mm groups by 1/3 grade   Balance Sitting EOB:  good -  Dynamic Standing:  fair wheeled walker   Sitting EOB: good   Dynamic Standing: fair wheeled walker   Sitting EOB:  good   Dynamic Standing: good      Patient is Alert & Oriented x person, place, time and situation and follows directions    Sensation:  Patient  denies numbness/tingling   Edema:  no   Endurance: fair     Vitals: room air   Blood Pressure at rest  Blood Pressure during session    Heart Rate at rest  Heart Rate during session    SPO2 at rest %  SPO2 during session 91-93%     Patient education  Patient educated on role of Physical Therapy, risks of immobility, safety and plan of care,  importance of mobility while in hospital , ankle pumps, quad set and glut set for edema control, blood clot prevention, importance and purpose of adaptive device and adjusted to proper height for the patient.  and safety      Patient response to education:   Pt verbalized understanding Pt demonstrated skill Pt requires further education in this area   Yes Partial Yes      Treatment:  Patient practiced and was instructed/facilitated in the following treatment: Patient sitting in a chair at start of tx session Pt stood, ambulated in the room with B knee buckling needing moderate assist for stability to get back to chair safely. Pt sat abruptly before squaring up to the chair safely. Pt performed seated exercises and mini squats, standing static endurance training. Therapeutic Exercises:  ankle pumps, hip abduction/adduction, long arc quad, seated marching and squat,  x 12 - 15 reps. At end of session, patient in chair with . call light and phone within reach,  all lines and tubes intact, nursing notified. Patient would benefit from continued skilled Physical Therapy to improve functional independence and quality of life. Patient's/ family goals   rehab    Time in 14:56  Time out  15:34    Total Treatment Time  38 minutes         CPT codes:    Therapeutic activities (53891)   26 minutes  2 unit(s)  Therapeutic exercises (64306)   12 minutes  1 unit(s)   GABY Zhou  [facility]  LIC # [id number]

## 2022-05-20 NOTE — CARE COORDINATION
Ss note:5/20/2022.2:06 PM NEED NEG RAPID COVID on day of discharge. Pt is from [facility] skilled rehab, is a bedhold and can return upon discharge, NO PRECERT. Pt agreeable to return, duong Boyle is primary decision maker if pt is confused [phone]. Will need signed GINA. SW will follow.  AG [place]

## 2022-05-21 LAB
ALBUMIN SERPL-MCNC: 2.9 G/DL (ref 3.5–5.2)
ALP BLD-CCNC: 68 U/L (ref 35–104)
ALT SERPL-CCNC: 17 U/L (ref 0–32)
ANION GAP SERPL CALCULATED.3IONS-SCNC: 12 MMOL/L (ref 7–16)
ANION GAP SERPL CALCULATED.3IONS-SCNC: 13 MMOL/L (ref 7–16)
ANISOCYTOSIS: ABNORMAL
AST SERPL-CCNC: 23 U/L (ref 0–31)
BASOPHILS ABSOLUTE: 0 E9/L (ref 0–0.2)
BASOPHILS RELATIVE PERCENT: 0 % (ref 0–2)
BILIRUB SERPL-MCNC: 0.4 MG/DL (ref 0–1.2)
BUN BLDV-MCNC: 39 MG/DL (ref 6–23)
BUN BLDV-MCNC: 42 MG/DL (ref 6–23)
BURR CELLS: ABNORMAL
CALCIUM SERPL-MCNC: 10.3 MG/DL (ref 8.6–10.2)
CALCIUM SERPL-MCNC: 9.8 MG/DL (ref 8.6–10.2)
CHLORIDE BLD-SCNC: 97 MMOL/L (ref 98–107)
CHLORIDE BLD-SCNC: 98 MMOL/L (ref 98–107)
CO2: 26 MMOL/L (ref 22–29)
CO2: 27 MMOL/L (ref 22–29)
CREAT SERPL-MCNC: 2.1 MG/DL (ref 0.5–1)
CREAT SERPL-MCNC: 2.1 MG/DL (ref 0.5–1)
EOSINOPHILS ABSOLUTE: 0 E9/L (ref 0.05–0.5)
EOSINOPHILS RELATIVE PERCENT: 0 % (ref 0–6)
GFR AFRICAN AMERICAN: 28
GFR AFRICAN AMERICAN: 28
GFR NON-AFRICAN AMERICAN: 24 ML/MIN/1.73
GFR NON-AFRICAN AMERICAN: 24 ML/MIN/1.73
GLUCOSE BLD-MCNC: 172 MG/DL (ref 74–99)
GLUCOSE BLD-MCNC: 197 MG/DL (ref 74–99)
HCT VFR BLD CALC: 30.7 % (ref 34–48)
HEMOGLOBIN: 9.1 G/DL (ref 11.5–15.5)
HYPOCHROMIA: ABNORMAL
IMMATURE GRANULOCYTES #: 0.13 E9/L
IMMATURE GRANULOCYTES %: 1.6 % (ref 0–5)
LYMPHOCYTES ABSOLUTE: 0.36 E9/L (ref 1.5–4)
LYMPHOCYTES RELATIVE PERCENT: 4.4 % (ref 20–42)
MAGNESIUM: 2.2 MG/DL (ref 1.6–2.6)
MCH RBC QN AUTO: 33.1 PG (ref 26–35)
MCHC RBC AUTO-ENTMCNC: 29.6 % (ref 32–34.5)
MCV RBC AUTO: 111.6 FL (ref 80–99.9)
MONOCYTES ABSOLUTE: 0.18 E9/L (ref 0.1–0.95)
MONOCYTES RELATIVE PERCENT: 2.2 % (ref 2–12)
NEUTROPHILS ABSOLUTE: 7.42 E9/L (ref 1.8–7.3)
NEUTROPHILS RELATIVE PERCENT: 91.8 % (ref 43–80)
OVALOCYTES: ABNORMAL
PDW BLD-RTO: 16.9 FL (ref 11.5–15)
PHOSPHORUS: 3.7 MG/DL (ref 2.5–4.5)
PLATELET # BLD: 178 E9/L (ref 130–450)
PMV BLD AUTO: 12.2 FL (ref 7–12)
POIKILOCYTES: ABNORMAL
POLYCHROMASIA: ABNORMAL
POTASSIUM SERPL-SCNC: 5.6 MMOL/L (ref 3.5–5)
POTASSIUM SERPL-SCNC: 5.7 MMOL/L (ref 3.5–5)
RBC # BLD: 2.75 E12/L (ref 3.5–5.5)
SODIUM BLD-SCNC: 136 MMOL/L (ref 132–146)
SODIUM BLD-SCNC: 137 MMOL/L (ref 132–146)
TEAR DROP CELLS: ABNORMAL
TOTAL PROTEIN: 5.6 G/DL (ref 6.4–8.3)
WBC # BLD: 8.1 E9/L (ref 4.5–11.5)

## 2022-05-21 PROCEDURE — 80053 COMPREHEN METABOLIC PANEL: CPT

## 2022-05-21 PROCEDURE — 6370000000 HC RX 637 (ALT 250 FOR IP): Performed by: INTERNAL MEDICINE

## 2022-05-21 PROCEDURE — 6360000002 HC RX W HCPCS: Performed by: INTERNAL MEDICINE

## 2022-05-21 PROCEDURE — 36415 COLL VENOUS BLD VENIPUNCTURE: CPT

## 2022-05-21 PROCEDURE — 83735 ASSAY OF MAGNESIUM: CPT

## 2022-05-21 PROCEDURE — 84100 ASSAY OF PHOSPHORUS: CPT

## 2022-05-21 PROCEDURE — 2700000000 HC OXYGEN THERAPY PER DAY

## 2022-05-21 PROCEDURE — 1200000000 HC SEMI PRIVATE

## 2022-05-21 PROCEDURE — 94640 AIRWAY INHALATION TREATMENT: CPT

## 2022-05-21 PROCEDURE — 85025 COMPLETE CBC W/AUTO DIFF WBC: CPT

## 2022-05-21 PROCEDURE — 80048 BASIC METABOLIC PNL TOTAL CA: CPT

## 2022-05-21 RX ORDER — AMOXICILLIN AND CLAVULANATE POTASSIUM 500; 125 MG/1; MG/1
1 TABLET, FILM COATED ORAL EVERY 12 HOURS SCHEDULED
Status: DISCONTINUED | OUTPATIENT
Start: 2022-05-21 | End: 2022-05-24 | Stop reason: HOSPADM

## 2022-05-21 RX ORDER — PREDNISONE 1 MG/1
5 TABLET ORAL DAILY
Status: DISCONTINUED | OUTPATIENT
Start: 2022-05-21 | End: 2022-05-22

## 2022-05-21 RX ORDER — IPRATROPIUM BROMIDE AND ALBUTEROL SULFATE 2.5; .5 MG/3ML; MG/3ML
3 SOLUTION RESPIRATORY (INHALATION) 4 TIMES DAILY
Qty: 360 ML | Refills: 1 | Status: SHIPPED | OUTPATIENT
Start: 2022-05-21 | End: 2022-09-15

## 2022-05-21 RX ORDER — METHYLPREDNISOLONE 4 MG/1
TABLET ORAL
Qty: 1 KIT | Refills: 0 | Status: SHIPPED | OUTPATIENT
Start: 2022-05-21 | End: 2022-05-27

## 2022-05-21 RX ADMIN — CLOZAPINE 50 MG: 25 TABLET ORAL at 00:57

## 2022-05-21 RX ADMIN — Medication 400 MG: at 08:55

## 2022-05-21 RX ADMIN — DESVENLAFAXINE 50 MG: 50 TABLET, EXTENDED RELEASE ORAL at 18:11

## 2022-05-21 RX ADMIN — AMOXICILLIN AND CLAVULANATE POTASSIUM 1 TABLET: 875; 125 TABLET, FILM COATED ORAL at 00:57

## 2022-05-21 RX ADMIN — FOLIC ACID 1 MG: 1 TABLET ORAL at 08:55

## 2022-05-21 RX ADMIN — Medication 1000 UNITS: at 08:55

## 2022-05-21 RX ADMIN — BUDESONIDE 500 MCG: 0.5 INHALANT RESPIRATORY (INHALATION) at 18:27

## 2022-05-21 RX ADMIN — CYCLOSPORINE 100 MG: 25 CAPSULE, GELATIN COATED ORAL at 22:10

## 2022-05-21 RX ADMIN — MYCOPHENOLATE MOFETIL 1000 MG: 250 CAPSULE ORAL at 08:55

## 2022-05-21 RX ADMIN — METOPROLOL TARTRATE 12.5 MG: 25 TABLET, FILM COATED ORAL at 00:57

## 2022-05-21 RX ADMIN — LEVOTHYROXINE SODIUM 50 MCG: 0.05 TABLET ORAL at 08:55

## 2022-05-21 RX ADMIN — ATORVASTATIN CALCIUM 10 MG: 10 TABLET, FILM COATED ORAL at 00:57

## 2022-05-21 RX ADMIN — Medication 125 MG: at 08:56

## 2022-05-21 RX ADMIN — AMOXICILLIN AND CLAVULANATE POTASSIUM 1 TABLET: 875; 125 TABLET, FILM COATED ORAL at 08:56

## 2022-05-21 RX ADMIN — APIXABAN 5 MG: 5 TABLET, FILM COATED ORAL at 08:55

## 2022-05-21 RX ADMIN — APIXABAN 5 MG: 5 TABLET, FILM COATED ORAL at 00:57

## 2022-05-21 RX ADMIN — ANTI-FUNGAL POWDER MICONAZOLE NITRATE TALC FREE: 1.42 POWDER TOPICAL at 00:55

## 2022-05-21 RX ADMIN — PREDNISONE 5 MG: 5 TABLET ORAL at 14:32

## 2022-05-21 RX ADMIN — CIPROFLOXACIN HYDROCHLORIDE 500 MG: 500 TABLET, FILM COATED ORAL at 08:55

## 2022-05-21 RX ADMIN — CLOZAPINE 50 MG: 25 TABLET ORAL at 22:12

## 2022-05-21 RX ADMIN — ALLOPURINOL 100 MG: 100 TABLET ORAL at 08:55

## 2022-05-21 RX ADMIN — SODIUM ZIRCONIUM CYCLOSILICATE 10 G: 5 POWDER, FOR SUSPENSION ORAL at 22:09

## 2022-05-21 RX ADMIN — Medication 125 MG: at 00:56

## 2022-05-21 RX ADMIN — METHYLPREDNISOLONE SODIUM SUCCINATE 20 MG: 40 INJECTION, POWDER, FOR SOLUTION INTRAMUSCULAR; INTRAVENOUS at 08:50

## 2022-05-21 RX ADMIN — CYCLOSPORINE 100 MG: 25 CAPSULE, GELATIN COATED ORAL at 00:56

## 2022-05-21 RX ADMIN — Medication 125 MG: at 14:32

## 2022-05-21 RX ADMIN — IPRATROPIUM BROMIDE AND ALBUTEROL SULFATE 1 AMPULE: .5; 2.5 SOLUTION RESPIRATORY (INHALATION) at 14:48

## 2022-05-21 RX ADMIN — ACYCLOVIR 400 MG: 400 TABLET ORAL at 22:12

## 2022-05-21 RX ADMIN — METOPROLOL TARTRATE 12.5 MG: 25 TABLET, FILM COATED ORAL at 22:11

## 2022-05-21 RX ADMIN — SODIUM BICARBONATE 650 MG: 650 TABLET ORAL at 08:55

## 2022-05-21 RX ADMIN — APIXABAN 5 MG: 5 TABLET, FILM COATED ORAL at 22:12

## 2022-05-21 RX ADMIN — SODIUM ZIRCONIUM CYCLOSILICATE 10 G: 5 POWDER, FOR SUSPENSION ORAL at 08:57

## 2022-05-21 RX ADMIN — ANTI-FUNGAL POWDER MICONAZOLE NITRATE TALC FREE: 1.42 POWDER TOPICAL at 09:00

## 2022-05-21 RX ADMIN — GUAIFENESIN 600 MG: 600 TABLET, EXTENDED RELEASE ORAL at 08:56

## 2022-05-21 RX ADMIN — IPRATROPIUM BROMIDE AND ALBUTEROL SULFATE 1 AMPULE: .5; 2.5 SOLUTION RESPIRATORY (INHALATION) at 18:27

## 2022-05-21 RX ADMIN — ATORVASTATIN CALCIUM 10 MG: 10 TABLET, FILM COATED ORAL at 22:12

## 2022-05-21 RX ADMIN — METOPROLOL TARTRATE 12.5 MG: 25 TABLET, FILM COATED ORAL at 08:55

## 2022-05-21 RX ADMIN — IPRATROPIUM BROMIDE AND ALBUTEROL SULFATE 1 AMPULE: .5; 2.5 SOLUTION RESPIRATORY (INHALATION) at 10:39

## 2022-05-21 RX ADMIN — GUAIFENESIN 600 MG: 600 TABLET, EXTENDED RELEASE ORAL at 00:57

## 2022-05-21 RX ADMIN — GUAIFENESIN 600 MG: 600 TABLET, EXTENDED RELEASE ORAL at 22:12

## 2022-05-21 RX ADMIN — AMOXICILLIN AND CLAVULANATE POTASSIUM 1 TABLET: 500; 125 TABLET, FILM COATED ORAL at 22:31

## 2022-05-21 RX ADMIN — ANTI-FUNGAL POWDER MICONAZOLE NITRATE TALC FREE: 1.42 POWDER TOPICAL at 22:08

## 2022-05-21 RX ADMIN — CYCLOSPORINE 100 MG: 25 CAPSULE, GELATIN COATED ORAL at 08:54

## 2022-05-21 RX ADMIN — MYCOPHENOLATE MOFETIL 1000 MG: 250 CAPSULE ORAL at 00:56

## 2022-05-21 RX ADMIN — Medication 125 MG: at 22:08

## 2022-05-21 RX ADMIN — ACYCLOVIR 400 MG: 400 TABLET ORAL at 00:57

## 2022-05-21 RX ADMIN — MYCOPHENOLATE MOFETIL 1000 MG: 250 CAPSULE ORAL at 22:10

## 2022-05-21 RX ADMIN — ACYCLOVIR 400 MG: 400 TABLET ORAL at 08:55

## 2022-05-21 ASSESSMENT — PAIN SCALES - GENERAL
PAINLEVEL_OUTOF10: 0

## 2022-05-21 ASSESSMENT — PAIN SCALES - WONG BAKER: WONGBAKER_NUMERICALRESPONSE: 0

## 2022-05-21 NOTE — PROGRESS NOTES
Dr. Hermes Clark MD,    Your patient is on a medication that requires a renal dose adjustment. Renal Function Assessment:    Date Body Weight IBW  Adjusted BW SCr  CrCl Dialysis status   5/21/2022 200 lb (90.7 kg)  Ideal body weight: 50.1 kg (110 lb 7.2 oz)  Adjusted ideal body weight: 66.3 kg (146 lb 4.3 oz) Serum creatinine: 2.1 mg/dL (H) 05/21/22 5424  Estimated creatinine clearance: 28 mL/min (A) N/a     Recent Labs     05/19/22  0858 05/20/22  0819 05/21/22  1557   CREATININE 1.8* 1.9* 2.1*       Estimated Creatinine Clearance: 28 mL/min (A) (based on SCr of 2.1 mg/dL (H)). Pharmacy has renally dose-adjusted the following medication(s):    Date Original Order Renally Adjusted Order   5/21/2022 Amoxicillin-clavulanate 875 mg Twice daily Amoxicillin-clavulanate 500 mg Twice daily       These changes were made per protocol according to the [facility] Renal Function-Based Dose Adjustments Policy    *Please note this dose may need readjusted if your patient's renal function significantly improves. Please contact pharmacy with any questions regarding these changes.     Jazmin Saenz PharmD.  5/21/2022   10:51 AM

## 2022-05-21 NOTE — PROGRESS NOTES
Assessment and Plan  Patient is a 78 y.o. female with the following medical Problems:   1. Acute exacerbation of COPD  2. S/p renal transplant  3. Prior history of DVT  4. Hypertension    Plan of care:  1. Change Solu-Medrol to her home dose of prednisone  2. Assess the need for home oxygen before discharge  3. Continue with full anticoagulation which covers for DVT prophylaxis  4. Incentive spirometer  5. Antibiotics management as per primary MD    Daily progress:  May 21, 2022: Patient has no respiratory complaints today. She has no fever, chills, rigors. She denies chest pain.       Past Medical History:  Past Medical History:   Diagnosis Date    Abnormal EKG     left bundle branch block    Acute exacerbation of chronic obstructive pulmonary disease (COPD) [facility] 5/19/2022    Acute gout involving toe of right foot 09/03/2020    Acute on chronic combined systolic (congestive) and diastolic (congestive) heart failure (HCC)     Cancer (HCC)     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    Cerebrovascular disease     Clotting disorder ([facility] 1985    thrombophlebitis after c section delivery    Depression     15 years followed by dr Ceasar Conner Hemodialysis patient [facility])     History of blood transfusion     Hyperlipidemia     Hypertension     Hypothyroidism     Leg swelling 09/03/2020    Lymphedema of both lower extremities 09/03/2020    Peripheral vascular disease ([facility]     Renal failure 11/2012    renal transplant    Thrombophlebitis     after c section delivery    Thyroid disease         Family History:   Family History   Problem Relation Age of Onset    Diabetes Mother     Diabetes Father     Dementia Father        Allergies:         Macrodantin [nitrofurantoin macrocrystal] and Sulfa antibiotics    Social history:  Social History     Socioeconomic History    Marital status:      Spouse name: Not on file    Number of children: Not on file    Years of education: Not on file    Highest education level: Not on file   Occupational History    Not on file   Tobacco Use    Smoking status: Former Smoker     Packs/day: 1.00     Years: 20.00     Pack years: 20.00     Quit date: 10/1/2015     Years since quittin.6    Smokeless tobacco: Never Used   Vaping Use    Vaping Use: Never used   Substance and Sexual Activity    Alcohol use: No     Comment: 2 cups coffee per day    Drug use: No    Sexual activity: Not on file   Other Topics Concern    Not on file   Social History Narrative    Not on file     Social Determinants of Health     Financial Resource Strain:     Difficulty of Paying Living Expenses: Not on file   Food Insecurity:     Worried About Running Out of Food in the Last Year: Not on file    [place] of Food in the Last Year: Not on file   Transportation Needs:     Lack of Transportation (Medical): Not on file    Lack of Transportation (Non-Medical):  Not on file   Physical Activity:     Days of Exercise per Week: Not on file    Minutes of Exercise per Session: Not on file   Stress:     Feeling of Stress : Not on file   Social Connections:     Frequency of Communication with Friends and Family: Not on file    Frequency of Social Gatherings with Friends and Family: Not on file    Attends [employer] Services: Not on file    Active Member of [address] [place] [address] [employer] or Organizations: Not on file    Attends Club or Organization Meetings: Not on file    Marital Status: Not on file   Intimate Partner Violence:     Fear of Current or Ex-Partner: Not on file    Emotionally Abused: Not on file    Physically Abused: Not on file    Sexually Abused: Not on file   Housing Stability:     Unable to Pay for Housing in the Last Year: Not on file    Number of [place] in the Last Year: Not on file    Unstable Housing in the Last Year: Not on file       Current Medications:     Current Facility-Administered Medications:     sodium zirconium cyclosilicate (LOKELMA) oral suspension 10 g, 10 g, Oral, BID, Susannah Ramirez MD, 10 g at 05/21/22 0857    amoxicillin-clavulanate (AUGMENTIN) 500-125 MG per tablet 1 tablet, 1 tablet, Oral, 2 times per day, Luiz Macedo MD    methylPREDNISolone sodium (SOLU-MEDROL) injection 20 mg, 20 mg, IntraVENous, BID, Lida Muñoz MD, 20 mg at 05/21/22 0850    magnesium oxide (MAG-OX) tablet 400 mg, 400 mg, Oral, Daily, Luiz Macedo MD, 400 mg at 05/21/22 2820    ciprofloxacin (CIPRO) tablet 500 mg, 500 mg, Oral, Daily, Luiz Macedo MD, 500 mg at 05/21/22 0855    ipratropium-albuterol (DUONEB) nebulizer solution 1 ampule, 1 ampule, Inhalation, 4x Daily, Luiz Macedo MD, 1 ampule at 05/21/22 1039    acetaminophen (TYLENOL) tablet 650 mg, 650 mg, Oral, Q6H PRN, Carl Ramirez MD    acyclovir (ZOVIRAX) tablet 400 mg, 400 mg, Oral, BID, Susannah Ramirez MD, 400 mg at 05/21/22 7769    allopurinol (ZYLOPRIM) tablet 100 mg, 100 mg, Oral, Daily, Susannah Ramirez MD, 100 mg at 05/21/22 8587    atorvastatin (LIPITOR) tablet 10 mg, 10 mg, Oral, Nightly, Susannah Ramirez MD, 10 mg at 05/21/22 0057    budesonide (PULMICORT) nebulizer suspension 500 mcg, 500 mcg, Nebulization, BID, Luiz Macedo MD, 500 mcg at 05/20/22 1811    cloZAPine (CLOZARIL) tablet 50 mg, 50 mg, Oral, Nightly, Susannah Ramirez MD, 50 mg at 05/21/22 0057    cycloSPORINE (SANDIMMUNE) capsule 100 mg, 100 mg, Oral, BID, Luiz Macedo MD, 100 mg at 05/21/22 2215    desvenlafaxine succinate (PRISTIQ) extended release tablet 50 mg, 50 mg, Oral, QPM, Luiz Macedo MD, 50 mg at 05/20/22 1729    diphenhydrAMINE (BENADRYL) tablet 25 mg, 25 mg, Oral, Q6H PRN, Carl Ramirez MD    folic acid (FOLVITE) tablet 1 mg, 1 mg, Oral, Daily, Luiz Macedo MD, 1 mg at 05/21/22 7690    guaiFENesin [facility]) extended release tablet 600 mg, 600 mg, Oral, BID, Luiz Macedo MD, 600 mg at 05/21/22 0856    levothyroxine (SYNTHROID) tablet 50 mcg, 50 mcg, Oral, Daily, Luiz Macedo MD, 50 mcg at 05/21/22 0855    metoprolol tartrate (LOPRESSOR) tablet 12.5 mg, 12.5 mg, Oral, BID, Susannah Ramirez MD, 12.5 mg at 05/21/22 0855    midodrine (PROAMATINE) tablet 5 mg, 5 mg, Oral, BID PRN, James Ramirez MD    miconazole (MICOTIN) 2 % powder, , Topical, BID, Ernie Cruz MD, Given at 05/21/22 0900    pantoprazole (PROTONIX) tablet 40 mg, 40 mg, Oral, QAM AC, Susannah Ramirez MD, 40 mg at 05/20/22 0556    simethicone (MYLICON) chewable tablet 160 mg, 160 mg, Oral, TID PRN, Ernie Cruz MD    sodium bicarbonate tablet 650 mg, 650 mg, Oral, Daily, Ernie Cruz MD, 650 mg at 05/21/22 0855    vancomycin (VANCOCIN) oral solution 125 mg, 125 mg, Oral, TID, Ernie Cruz MD, 125 mg at 05/21/22 4571    vitamin D (CHOLECALCIFEROL) tablet 1,000 Units, 1,000 Units, Oral, Daily, Ernie Cruz MD, 1,000 Units at 05/21/22 0855    apixaban (ELIQUIS) tablet 5 mg, 5 mg, Oral, BID, Ernie Cruz MD, 5 mg at 05/21/22 0204    mycophenolate (CELLCEPT) capsule 1,000 mg, 1,000 mg, Oral, BID, Ernie Cruz MD, 1,000 mg at 05/21/22 1091      Review of Systems:   General: denies weight gain, denies loss of appetite, fever, chills, night sweats. HEENT: denies headaches, dizziness, head trauma, visual changes, eye pain, tinnitus, nosebleeds, hoarseness or throat pain    Respiratory: denies chest pain, dyspnea, cough and hemoptysis  Cardiovascular: denies orthopnea, paroxysmal nocturnal dyspnea, leg swelling, and previous heart attack. Gastrointestinal: denies pain, nausea vomiting, diarrhea, constipation, melena or bleeding.   Genitourinary: denies hematuria, frequency, urgency or dysuria  Neurology: denies syncope, seizures, paralysis, paraesthesia   Endocrine: denies polyuria, polydipsia, skin or hair changes, and heat or cold intolerance  Musculoskeletal: denies joint pain, swelling, arthritis or myalgia  Hematologic: denies bleeding, adenopathy and easy bruising  Skin: denies rashes and skin discoloration  Psychiatry: denies depression    Physical Exam:   Vital Signs:  BP (!) 142/85   Pulse 83   Temp 98 °F (36.7 °C) (Oral)   Resp 18   Ht 5' 2\" (1.575 m)   Wt 200 lb (90.7 kg)   SpO2 96%   BMI 36.58 kg/m²     Input/Output: In: 500 [P.O.:500]  Out: 800     Oxygen requirements: RA      General appearance:  not in pain or distress, in no respiratory distress    HEENT: Atraumatic/normocephalic, EOMI, CJ, pharynx clear, moist mucosa, redness of the uvula appreciated,   Neck: Supple, no jugular venous distension, lymphadenopathy, thyromegaly or carotid bruits  Chest: Equal normal breath sounds, no wheezing, no crackles and no tenderness over ribs   Cardiovascular: Normal S1, S2, regular rate and rhythm, no murmur, rub or gallop  Abdomen: Normal sounds present, soft, lax with no tenderness, no hepatosplenomegaly, and no masses  Extremities: No edema. Pulses are equally present. Skin: intact, no rashes   Neurologic: Alert and oriented x 3, No focal deficit     Investigations:  Labs, radiological imaging and cardiac work up were reviewed        STAFF PHYSICIAN NOTE OF PERSONAL INVOLVEMENT IN CARE  As the attending physician, I certify that I personally reviewed the patient's history and personally examined the patient to confirm the physical findings described above, and that I reviewed the relevant imaging studies and available reports. I also discussed the differential diagnosis and all of the proposed management plans with the patient and individuals accompanying the patient to this visit. They had the opportunity to ask questions about the proposed management plans and to have those questions answered.       Electronically signed by Sophia Griffin MD on 5/21/2022 at 1:38 PM

## 2022-05-21 NOTE — CONSULTS
[facility] Roberto A. Marilyne Liberty, MD Ruby Sago, MD Suellen Sandhoff, MD Aretta Duffel, MD Audie Rummage, ZAIRE Porter Do  Consultation  Patient's Name: Aleena Rosales  2:48 PM  5/21/2022    Nephrologist: Qing Artis MD    Reason for Consult: CECILY on top of CKD  Requesting Physician:  Steven Arias MD    Chief Complaint: Shortness of breath    History Obtained From: Patient, chart    History of Present Ilness: This is [age]years old lady who presented to hospital with worsening shortness of breath, past medical history positive for COPD, acute on chronic combined systolic congestive and diastolic heart failure, CVA, clotting disorders, depression, hyperlipidemia, hypertension, hypothyroidism history of kidney transplant history of ESRD with lymphedema in lower extremities.     Past Medical History:   Diagnosis Date    Abnormal EKG     left bundle branch block    Acute exacerbation of chronic obstructive pulmonary disease (COPD) ([address] 5/19/2022    Acute gout involving toe of right foot 09/03/2020    Acute on chronic combined systolic (congestive) and diastolic (congestive) heart failure (HCC)     Cancer (HCC)     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    Cerebrovascular disease     Clotting disorder ([address] 1985    thrombophlebitis after c section delivery    Depression     15 years followed by dr Kathrine Coleman Hemodialysis patient [facility])     History of blood transfusion     Hyperlipidemia     Hypertension     Hypothyroidism     Leg swelling 09/03/2020    Lymphedema of both lower extremities 09/03/2020    Peripheral vascular disease [facility]     Renal failure 11/2012    renal transplant    Thrombophlebitis     after c section delivery    Thyroid disease        Past Surgical History:   Procedure Laterality Date   [address]    one normal delivery    COLECTOMY N/A 1/15/2022    DIAGNOSTIC  LAPAROSCOPY LYSIS OF ADHESIONS performed by Jake Washburn MD at [address] [place] [address] Drive CATH LAB PROCEDURE  2006    normal coronaries and 1996 normal coronaries    DIALYSIS FISTULA CREATION  march and july 2012    phase one and two patient will start dialysis when needed   [address]    transfemoral venous bypass grafts    IR IVC FILTER PLACEMENT W IMAGING  2/26/2022    IR IVC FILTER PLACEMENT W IMAGING 2/26/2022 [address] SPECIAL PROCEDURES    KIDNEY TRANSPLANT  2016    KIDNEY TRANSPLANT  2015    KIDNEY TRANSPLANT  2015    PARATHYROIDECTOMY  2006    left parathyroid  implant and parathyroidectomy    TRANSESOPHAGEAL ECHOCARDIOGRAM N/A 2/28/2022    TRANSESOPHAGEAL ECHOCARDIOGRAM WITH BUBBLE STUDY performed by Christo Smith MD at [facility] History   Problem Relation Age of Onset    Diabetes Mother     Diabetes Father     Dementia Father         reports that she quit smoking about 6 years ago. She has a 20.00 pack-year smoking history. She has never used smokeless tobacco. She reports that she does not drink alcohol and does not use drugs.     Allergies:  Macrodantin [nitrofurantoin macrocrystal] and Sulfa antibiotics    Current Medications:    sodium zirconium cyclosilicate (LOKELMA) oral suspension 10 g, BID  amoxicillin-clavulanate (AUGMENTIN) 500-125 MG per tablet 1 tablet, 2 times per day  predniSONE (DELTASONE) tablet 5 mg, Daily  magnesium oxide (MAG-OX) tablet 400 mg, Daily  ciprofloxacin (CIPRO) tablet 500 mg, Daily  ipratropium-albuterol (DUONEB) nebulizer solution 1 ampule, 4x Daily  acetaminophen (TYLENOL) tablet 650 mg, Q6H PRN  acyclovir (ZOVIRAX) tablet 400 mg, BID  allopurinol (ZYLOPRIM) tablet 100 mg, Daily  atorvastatin (LIPITOR) tablet 10 mg, Nightly  budesonide (PULMICORT) nebulizer suspension 500 mcg, BID  cloZAPine (CLOZARIL) tablet 50 mg, Nightly  cycloSPORINE (SANDIMMUNE) capsule 100 mg, BID  desvenlafaxine succinate (PRISTIQ) extended release tablet 50 mg, QPM  diphenhydrAMINE (BENADRYL) tablet 25 mg, S3R PRN  folic acid (FOLVITE) tablet 1 mg, Daily  guaiFENesin (MUCINEX) extended release tablet 600 mg, BID  levothyroxine (SYNTHROID) tablet 50 mcg, Daily  metoprolol tartrate (LOPRESSOR) tablet 12.5 mg, BID  midodrine (PROAMATINE) tablet 5 mg, BID PRN  miconazole (MICOTIN) 2 % powder, BID  pantoprazole (PROTONIX) tablet 40 mg, QAM AC  simethicone (MYLICON) chewable tablet 160 mg, TID PRN  sodium bicarbonate tablet 650 mg, Daily  vancomycin (VANCOCIN) oral solution 125 mg, TID  vitamin D (CHOLECALCIFEROL) tablet 1,000 Units, Daily  apixaban (ELIQUIS) tablet 5 mg, BID  mycophenolate (CELLCEPT) capsule 1,000 mg, BID        Review of Systems:       Physical exam:   Vital signs stable, afebrile  Head and ENT; normocephalic/atraumatic/supple neck/JVD  Lungs; bilateral wheezing and crackles noted  Heart; regular rate and rhythm no friction physical  Abdomen; soft no tenderness no organomegaly  Extremities; bilateral lymphedema noted  Neurologic; physiologic    Data:   Labs:  CBC with Differential:    Lab Results   Component Value Date    WBC 8.1 05/21/2022    RBC 2.75 05/21/2022    HGB 9.1 05/21/2022    HCT 30.7 05/21/2022     05/21/2022    .6 05/21/2022    MCH 33.1 05/21/2022    MCHC 29.6 05/21/2022    RDW 16.9 05/21/2022    NRBC 0.9 03/22/2022    METASPCT 1.7 03/22/2022    LYMPHOPCT 4.4 05/21/2022    MONOPCT 2.2 05/21/2022    MYELOPCT 1.7 05/17/2022    BASOPCT 0.0 05/21/2022    MONOSABS 0.18 05/21/2022    LYMPHSABS 0.36 05/21/2022    EOSABS 0.00 05/21/2022    BASOSABS 0.00 05/21/2022     CMP:    Lab Results   Component Value Date     05/21/2022    K 5.7 05/21/2022    K 4.3 04/21/2022    CL 98 05/21/2022    CO2 27 05/21/2022    BUN 39 05/21/2022    CREATININE 2.1 05/21/2022    GFRAA 28 05/21/2022    LABGLOM 24 05/21/2022    GLUCOSE 172 05/21/2022    PROT 5.6 05/21/2022    LABALBU 2.9 05/21/2022    CALCIUM 9.8 05/21/2022    BILITOT 0.4 05/21/2022 ALKPHOS 68 05/21/2022    AST 23 05/21/2022    ALT 17 05/21/2022     Ionized Calcium:  No results found for: IONCA  Magnesium:    Lab Results   Component Value Date    MG 2.2 05/21/2022     Phosphorus:    Lab Results   Component Value Date    PHOS 3.7 05/21/2022     U/A:    Lab Results   Component Value Date    COLORU DKYELLOW 05/18/2022    PHUR 5.5 05/18/2022    WBCUA 0-1 05/18/2022    RBCUA 0-1 05/18/2022    RBCUA 10-20 03/12/2014    YEAST Present 04/09/2021    BACTERIA RARE 05/18/2022    CLARITYU Clear 05/18/2022    SPECGRAV 1.015 05/18/2022    LEUKOCYTESUR Negative 05/18/2022    UROBILINOGEN 0.2 05/18/2022    BILIRUBINUR Negative 05/18/2022    BLOODU Negative 05/18/2022    GLUCOSEU Negative 05/18/2022     Microalbumen/Creatinine ratio:  No components found for: RUCREAT  Iron Saturation:  No components found for: PERCENTFE  TIBC:    Lab Results   Component Value Date    TIBC 132 04/23/2022     FERRITIN:    Lab Results   Component Value Date    FERRITIN 788 04/23/2022        Imaging:  XR CHEST (2 VW)   Final Result   No acute process. Stable cardiomegaly. NM LUNG VENT/PERFUSION (VQ)   Final Result   Very low probability for pulmonary embolism. US DUP LOWER EXTREMITIES BILATERAL VENOUS   Final Result   No evidence of DVT in either lower extremity. XR CHEST PORTABLE   Final Result   1. Cardiomegaly. There are no findings of failure or pneumonia. ASSESSMENT AND PLAN:  1. COPD exacerbation, probably contributing to her hypoxia and we will  place her on DuoNeb nebulizer every 6 hours and will avoid antibiotic if  possible as she has very minimal cough and because of her history of C.  difficile, we will get a V/Q scan to rule out PE since her D-dimer was  slightly elevated. 2.  Acute-on-chronic renal failure. Her creatinine was 2. Her baseline  was 1.5 to 1.6. We will hold her Lasix and monitor her creatinine.    History of renal failure, status post renal transplant in 2015.  3.  History of left femoral and popliteal vein DVT. We will resume her  Eliquis for at least six months. She was diagnosed in February. 4.  History of SVT and tachycardia. She is on metoprolol. 5.  Hyperlipidemia. Resume her statin. 6.  Hypothyroidism, resume her Synthroid. 7.  History of DVT in the left to mid and distal femoral vein and  popliteal vein status post IVC filter placed in 02/2022 for possible GI  bleed at that time. 8.  Anemia of chronic disease. 9.  History of C. difficile colitis. 10.  History of ESBL in the urine. We will check urinalysis,  microscopy, and culture and sensitivity. Agree with all the current plans we will follow patient closely with you, patient reported to me that she is appointment to see her transplant nephrologist in [facility] our provider with my business card so they can call me as needed basis. We will obtain urine lites today and follow BMP and other labs tomorrow  Thank you for the opportunity to participate in the care of your pleasant patient. We look forward to following along with you.         Electronically signed by Manoj Jimenez MD on 5/21/2022 at 2:49 PM

## 2022-05-22 LAB
ALBUMIN SERPL-MCNC: 3.3 G/DL (ref 3.5–5.2)
ALP BLD-CCNC: 67 U/L (ref 35–104)
ALT SERPL-CCNC: 28 U/L (ref 0–32)
ANION GAP SERPL CALCULATED.3IONS-SCNC: 10 MMOL/L (ref 7–16)
ANISOCYTOSIS: ABNORMAL
AST SERPL-CCNC: 32 U/L (ref 0–31)
BASOPHILS ABSOLUTE: 0 E9/L (ref 0–0.2)
BASOPHILS RELATIVE PERCENT: 0.1 % (ref 0–2)
BILIRUB SERPL-MCNC: 0.4 MG/DL (ref 0–1.2)
BLOOD CULTURE, ROUTINE: NORMAL
BUN BLDV-MCNC: 44 MG/DL (ref 6–23)
CALCIUM SERPL-MCNC: 10.1 MG/DL (ref 8.6–10.2)
CHLORIDE BLD-SCNC: 98 MMOL/L (ref 98–107)
CO2: 29 MMOL/L (ref 22–29)
CREAT SERPL-MCNC: 2.2 MG/DL (ref 0.5–1)
EOSINOPHILS ABSOLUTE: 0 E9/L (ref 0.05–0.5)
EOSINOPHILS RELATIVE PERCENT: 0.1 % (ref 0–6)
GFR AFRICAN AMERICAN: 27
GFR NON-AFRICAN AMERICAN: 22 ML/MIN/1.73
GLUCOSE BLD-MCNC: 118 MG/DL (ref 74–99)
HCT VFR BLD CALC: 30 % (ref 34–48)
HEMOGLOBIN: 9.4 G/DL (ref 11.5–15.5)
LYMPHOCYTES ABSOLUTE: 0.77 E9/L (ref 1.5–4)
LYMPHOCYTES RELATIVE PERCENT: 7.8 % (ref 20–42)
MCH RBC QN AUTO: 33.6 PG (ref 26–35)
MCHC RBC AUTO-ENTMCNC: 31.3 % (ref 32–34.5)
MCV RBC AUTO: 107.1 FL (ref 80–99.9)
MONOCYTES ABSOLUTE: 0.19 E9/L (ref 0.1–0.95)
MONOCYTES RELATIVE PERCENT: 1.7 % (ref 2–12)
MYELOCYTE PERCENT: 0.9 % (ref 0–0)
NEUTROPHILS ABSOLUTE: 8.74 E9/L (ref 1.8–7.3)
NEUTROPHILS RELATIVE PERCENT: 89.6 % (ref 43–80)
PDW BLD-RTO: 16.9 FL (ref 11.5–15)
PLATELET # BLD: 173 E9/L (ref 130–450)
PMV BLD AUTO: 12.4 FL (ref 7–12)
POLYCHROMASIA: ABNORMAL
POTASSIUM SERPL-SCNC: 5.3 MMOL/L (ref 3.5–5)
RBC # BLD: 2.8 E12/L (ref 3.5–5.5)
SODIUM BLD-SCNC: 137 MMOL/L (ref 132–146)
TOTAL PROTEIN: 5.5 G/DL (ref 6.4–8.3)
WBC # BLD: 9.6 E9/L (ref 4.5–11.5)

## 2022-05-22 PROCEDURE — 6360000002 HC RX W HCPCS: Performed by: INTERNAL MEDICINE

## 2022-05-22 PROCEDURE — 36415 COLL VENOUS BLD VENIPUNCTURE: CPT

## 2022-05-22 PROCEDURE — 6370000000 HC RX 637 (ALT 250 FOR IP): Performed by: INTERNAL MEDICINE

## 2022-05-22 PROCEDURE — 94640 AIRWAY INHALATION TREATMENT: CPT

## 2022-05-22 PROCEDURE — 1200000000 HC SEMI PRIVATE

## 2022-05-22 PROCEDURE — 2580000003 HC RX 258: Performed by: INTERNAL MEDICINE

## 2022-05-22 PROCEDURE — 80053 COMPREHEN METABOLIC PANEL: CPT

## 2022-05-22 PROCEDURE — 85025 COMPLETE CBC W/AUTO DIFF WBC: CPT

## 2022-05-22 RX ORDER — SODIUM CHLORIDE 9 MG/ML
INJECTION, SOLUTION INTRAVENOUS CONTINUOUS
Status: DISCONTINUED | OUTPATIENT
Start: 2022-05-22 | End: 2022-05-24 | Stop reason: HOSPADM

## 2022-05-22 RX ORDER — PREDNISONE 20 MG/1
20 TABLET ORAL 2 TIMES DAILY
Status: DISCONTINUED | OUTPATIENT
Start: 2022-05-22 | End: 2022-05-23

## 2022-05-22 RX ADMIN — CLOZAPINE 50 MG: 25 TABLET ORAL at 21:14

## 2022-05-22 RX ADMIN — ACYCLOVIR 400 MG: 400 TABLET ORAL at 09:56

## 2022-05-22 RX ADMIN — CYCLOSPORINE 100 MG: 25 CAPSULE, GELATIN COATED ORAL at 21:13

## 2022-05-22 RX ADMIN — ALLOPURINOL 100 MG: 100 TABLET ORAL at 09:54

## 2022-05-22 RX ADMIN — ACYCLOVIR 400 MG: 400 TABLET ORAL at 21:12

## 2022-05-22 RX ADMIN — Medication 125 MG: at 21:18

## 2022-05-22 RX ADMIN — PREDNISONE 20 MG: 20 TABLET ORAL at 21:16

## 2022-05-22 RX ADMIN — SODIUM BICARBONATE 650 MG: 650 TABLET ORAL at 09:55

## 2022-05-22 RX ADMIN — PREDNISONE 20 MG: 20 TABLET ORAL at 09:55

## 2022-05-22 RX ADMIN — IPRATROPIUM BROMIDE AND ALBUTEROL SULFATE 1 AMPULE: .5; 2.5 SOLUTION RESPIRATORY (INHALATION) at 18:31

## 2022-05-22 RX ADMIN — ANTI-FUNGAL POWDER MICONAZOLE NITRATE TALC FREE: 1.42 POWDER TOPICAL at 21:20

## 2022-05-22 RX ADMIN — MYCOPHENOLATE MOFETIL 1000 MG: 250 CAPSULE ORAL at 09:59

## 2022-05-22 RX ADMIN — PANTOPRAZOLE SODIUM 40 MG: 40 TABLET, DELAYED RELEASE ORAL at 09:55

## 2022-05-22 RX ADMIN — Medication 125 MG: at 09:59

## 2022-05-22 RX ADMIN — SODIUM ZIRCONIUM CYCLOSILICATE 10 G: 5 POWDER, FOR SUSPENSION ORAL at 09:54

## 2022-05-22 RX ADMIN — Medication 400 MG: at 09:55

## 2022-05-22 RX ADMIN — BUDESONIDE 500 MCG: 0.5 INHALANT RESPIRATORY (INHALATION) at 06:18

## 2022-05-22 RX ADMIN — GUAIFENESIN 600 MG: 600 TABLET, EXTENDED RELEASE ORAL at 21:11

## 2022-05-22 RX ADMIN — IPRATROPIUM BROMIDE AND ALBUTEROL SULFATE 1 AMPULE: .5; 2.5 SOLUTION RESPIRATORY (INHALATION) at 06:18

## 2022-05-22 RX ADMIN — APIXABAN 5 MG: 5 TABLET, FILM COATED ORAL at 21:12

## 2022-05-22 RX ADMIN — METOPROLOL TARTRATE 12.5 MG: 25 TABLET, FILM COATED ORAL at 21:11

## 2022-05-22 RX ADMIN — SODIUM CHLORIDE: 9 INJECTION, SOLUTION INTRAVENOUS at 10:16

## 2022-05-22 RX ADMIN — IPRATROPIUM BROMIDE AND ALBUTEROL SULFATE 1 AMPULE: .5; 2.5 SOLUTION RESPIRATORY (INHALATION) at 10:26

## 2022-05-22 RX ADMIN — Medication 125 MG: at 14:10

## 2022-05-22 RX ADMIN — ATORVASTATIN CALCIUM 10 MG: 10 TABLET, FILM COATED ORAL at 21:16

## 2022-05-22 RX ADMIN — CIPROFLOXACIN HYDROCHLORIDE 500 MG: 500 TABLET, FILM COATED ORAL at 09:54

## 2022-05-22 RX ADMIN — MYCOPHENOLATE MOFETIL 1000 MG: 250 CAPSULE ORAL at 21:12

## 2022-05-22 RX ADMIN — DESVENLAFAXINE 50 MG: 50 TABLET, EXTENDED RELEASE ORAL at 17:45

## 2022-05-22 RX ADMIN — LEVOTHYROXINE SODIUM 50 MCG: 0.05 TABLET ORAL at 09:55

## 2022-05-22 RX ADMIN — AMOXICILLIN AND CLAVULANATE POTASSIUM 1 TABLET: 500; 125 TABLET, FILM COATED ORAL at 09:54

## 2022-05-22 RX ADMIN — Medication 1000 UNITS: at 09:56

## 2022-05-22 RX ADMIN — GUAIFENESIN 600 MG: 600 TABLET, EXTENDED RELEASE ORAL at 09:55

## 2022-05-22 RX ADMIN — FOLIC ACID 1 MG: 1 TABLET ORAL at 09:55

## 2022-05-22 RX ADMIN — IPRATROPIUM BROMIDE AND ALBUTEROL SULFATE 1 AMPULE: .5; 2.5 SOLUTION RESPIRATORY (INHALATION) at 14:53

## 2022-05-22 RX ADMIN — ANTI-FUNGAL POWDER MICONAZOLE NITRATE TALC FREE: 1.42 POWDER TOPICAL at 09:56

## 2022-05-22 RX ADMIN — CYCLOSPORINE 100 MG: 25 CAPSULE, GELATIN COATED ORAL at 09:58

## 2022-05-22 RX ADMIN — APIXABAN 5 MG: 5 TABLET, FILM COATED ORAL at 09:55

## 2022-05-22 RX ADMIN — METOPROLOL TARTRATE 12.5 MG: 25 TABLET, FILM COATED ORAL at 09:56

## 2022-05-22 RX ADMIN — BUDESONIDE 500 MCG: 0.5 INHALANT RESPIRATORY (INHALATION) at 18:31

## 2022-05-22 RX ADMIN — AMOXICILLIN AND CLAVULANATE POTASSIUM 1 TABLET: 500; 125 TABLET, FILM COATED ORAL at 21:14

## 2022-05-22 ASSESSMENT — PAIN SCALES - GENERAL
PAINLEVEL_OUTOF10: 0

## 2022-05-22 NOTE — PROGRESS NOTES
Pulmonary/Critical Care Progress Note    We are following patient for COPD exacerbation, status post renal transplant, possible underlying steroid myopathy, history of stroke, previous history of DVT with thromboembolic disease    SUBJECTIVE:  Patient is in bed and has not been in the chair today. I spoke with the nurse and she will make sure the patient is ambulated if possible and at least mobilized into a chair. Her steroids have been tapered to oral doses and we will taper further tomorrow. We will check a serum aldolase as a way to get a sense of whether she has steroid myopathy. Saturation is currently 93% on room air representing significant improvement.     MEDICATIONS:   predniSONE  20 mg Oral BID    sodium zirconium cyclosilicate  10 g Oral Daily    amoxicillin-clavulanate  1 tablet Oral 2 times per day    magnesium oxide  400 mg Oral Daily    ciprofloxacin  500 mg Oral Daily    ipratropium-albuterol  1 ampule Inhalation 4x Daily    acyclovir  400 mg Oral BID    allopurinol  100 mg Oral Daily    atorvastatin  10 mg Oral Nightly    budesonide  500 mcg Nebulization BID    cloZAPine  50 mg Oral Nightly    cycloSPORINE  100 mg Oral BID    desvenlafaxine succinate  50 mg Oral QPM    folic acid  1 mg Oral Daily    guaiFENesin  600 mg Oral BID    levothyroxine  50 mcg Oral Daily    metoprolol tartrate  12.5 mg Oral BID    miconazole   Topical BID    pantoprazole  40 mg Oral QAM AC    sodium bicarbonate  650 mg Oral Daily    vancomycin  125 mg Oral TID    Vitamin D  1,000 Units Oral Daily    apixaban  5 mg Oral BID    mycophenolate  1,000 mg Oral BID      sodium chloride 50 mL/hr at 05/22/22 1016     acetaminophen, diphenhydrAMINE, midodrine, simethicone      REVIEW OF SYSTEMS:  Constitutional: Denies fever, weight loss, night sweats, and fatigue  Skin: Denies pigmentation, dark lesions, and rashes   HEENT: Denies hearing loss, tinnitus, ear drainage, epistaxis, sore throat, and hoarseness. Cardiovascular: Denies palpitations, chest pain, and chest pressure. Respiratory: Denies cough, dyspnea at rest, hemoptysis, apnea, and choking. Gastrointestinal: Denies nausea, vomiting, poor appetite, diarrhea, heartburn or reflux  Genitourinary: Denies dysuria, frequency, urgency or hematuria  Musculoskeletal: Denies myalgias, muscle weakness, and bone pain  Neurological: Denies dizziness, vertigo, headache, and focal weakness  Psychological: Denies anxiety and depression  Endocrine: Denies heat intolerance and cold intolerance  Hematopoietic/Lymphatic: Denies bleeding problems and blood transfusions    OBJECTIVE:  Vitals:    05/22/22 0730   BP: (!) 151/86   Pulse: 83   Resp: 18   Temp: 97.9 °F (36.6 °C)   SpO2: 93%        O2 Flow Rate (L/min): 0.5 L/min  O2 Device: None (Room air)    PHYSICAL EXAM:  Constitutional: No fever, chills, diaphoresis  Skin: Skin rash, no skin breakdown  HEENT: Unremarkable  Neck: No JVD, lymphadenopathy, thyromegaly  Cardiovascular: 1, S2 regular. No S3 murmurs rubs present  Respiratory: Very soft end expiratory wheeze over both posterior lung fields with a few crackles at the bases  Gastrointestinal: Soft, obese, nontender  Genitourinary: No CVA tenderness  Extremities: No clubbing, cyanosis, or edema  Neurological: Awake, alert, oriented x3.   No evidence of focal motor or sensory deficits  Psychological: Appropriate affect although somewhat anxious    LABS:  WBC   Date Value Ref Range Status   05/22/2022 9.6 4.5 - 11.5 E9/L Final   05/21/2022 8.1 4.5 - 11.5 E9/L Final   05/20/2022 8.1 4.5 - 11.5 E9/L Final     Hemoglobin   Date Value Ref Range Status   05/22/2022 9.4 (L) 11.5 - 15.5 g/dL Final   05/21/2022 9.1 (L) 11.5 - 15.5 g/dL Final   05/20/2022 9.6 (L) 11.5 - 15.5 g/dL Final     Hematocrit   Date Value Ref Range Status   05/22/2022 30.0 (L) 34.0 - 48.0 % Final   05/21/2022 30.7 (L) 34.0 - 48.0 % Final   05/20/2022 31.5 (L) 34.0 - 48.0 % Final     MCV   Date Value Ref Range Status   05/22/2022 107.1 (H) 80.0 - 99.9 fL Final   05/21/2022 111.6 (H) 80.0 - 99.9 fL Final   05/20/2022 109.8 (H) 80.0 - 99.9 fL Final     Platelets   Date Value Ref Range Status   05/22/2022 173 130 - 450 E9/L Corrected     Comment:     Platelet clumps are identified. This may decrease the automated platelet  count artifactually. 05/21/2022 178 130 - 450 E9/L Final   05/20/2022 177 130 - 450 E9/L Final     Sodium   Date Value Ref Range Status   05/22/2022 137 132 - 146 mmol/L Final   05/21/2022 136 132 - 146 mmol/L Final   05/21/2022 137 132 - 146 mmol/L Final     Potassium   Date Value Ref Range Status   05/22/2022 5.3 (H) 3.5 - 5.0 mmol/L Final   05/21/2022 5.6 (H) 3.5 - 5.0 mmol/L Final   05/21/2022 5.7 (H) 3.5 - 5.0 mmol/L Final     Potassium reflex Magnesium   Date Value Ref Range Status   04/21/2022 4.3 3.5 - 5.0 mmol/L Final   03/09/2022 4.7 3.5 - 5.0 mmol/L Final   02/26/2022 4.7 3.5 - 5.0 mmol/L Final     Comment:     Specimen is moderately Hemolyzed. Result may be artificially increased.      Chloride   Date Value Ref Range Status   05/22/2022 98 98 - 107 mmol/L Final   05/21/2022 97 (L) 98 - 107 mmol/L Final   05/21/2022 98 98 - 107 mmol/L Final     CO2   Date Value Ref Range Status   05/22/2022 29 22 - 29 mmol/L Final   05/21/2022 26 22 - 29 mmol/L Final   05/21/2022 27 22 - 29 mmol/L Final     BUN   Date Value Ref Range Status   05/22/2022 44 (H) 6 - 23 mg/dL Final   05/21/2022 42 (H) 6 - 23 mg/dL Final   05/21/2022 39 (H) 6 - 23 mg/dL Final     CREATININE   Date Value Ref Range Status   05/22/2022 2.2 (H) 0.5 - 1.0 mg/dL Final   05/21/2022 2.1 (H) 0.5 - 1.0 mg/dL Final   05/21/2022 2.1 (H) 0.5 - 1.0 mg/dL Final     Glucose   Date Value Ref Range Status   05/22/2022 118 (H) 74 - 99 mg/dL Final   05/21/2022 197 (H) 74 - 99 mg/dL Final   05/21/2022 172 (H) 74 - 99 mg/dL Final     Calcium   Date Value Ref Range Status   05/22/2022 10.1 8.6 - 10.2 mg/dL Final   05/21/2022 10.3 (H) 8.6 - 10.2 mg/dL Final   05/21/2022 9.8 8.6 - 10.2 mg/dL Final     Total Protein   Date Value Ref Range Status   05/22/2022 5.5 (L) 6.4 - 8.3 g/dL Final   05/21/2022 5.6 (L) 6.4 - 8.3 g/dL Final   05/20/2022 5.9 (L) 6.4 - 8.3 g/dL Final     Albumin   Date Value Ref Range Status   05/22/2022 3.3 (L) 3.5 - 5.2 g/dL Final   05/21/2022 2.9 (L) 3.5 - 5.2 g/dL Final   05/20/2022 3.1 (L) 3.5 - 5.2 g/dL Final     Total Bilirubin   Date Value Ref Range Status   05/22/2022 0.4 0.0 - 1.2 mg/dL Final   05/21/2022 0.4 0.0 - 1.2 mg/dL Final   05/20/2022 0.5 0.0 - 1.2 mg/dL Final     Alkaline Phosphatase   Date Value Ref Range Status   05/22/2022 67 35 - 104 U/L Final   05/21/2022 68 35 - 104 U/L Final   05/20/2022 70 35 - 104 U/L Final     AST   Date Value Ref Range Status   05/22/2022 32 (H) 0 - 31 U/L Final     Comment:     Specimen is slightly Hemolyzed. Result may be artificially increased. 05/21/2022 23 0 - 31 U/L Final   05/20/2022 14 0 - 31 U/L Final     ALT   Date Value Ref Range Status   05/22/2022 28 0 - 32 U/L Final   05/21/2022 17 0 - 32 U/L Final   05/20/2022 9 0 - 32 U/L Final     GFR Non-   Date Value Ref Range Status   05/22/2022 22 >=60 mL/min/1.73 Final     Comment:     Chronic Kidney Disease: less than 60 ml/min/1.73 sq.m. Kidney Failure: less than 15 ml/min/1.73 sq.m. Results valid for patients 18 years and older. 05/21/2022 24 >=60 mL/min/1.73 Final     Comment:     Chronic Kidney Disease: less than 60 ml/min/1.73 sq.m. Kidney Failure: less than 15 ml/min/1.73 sq.m. Results valid for patients 18 years and older. 05/21/2022 24 >=60 mL/min/1.73 Final     Comment:     Chronic Kidney Disease: less than 60 ml/min/1.73 sq.m. Kidney Failure: less than 15 ml/min/1.73 sq.m. Results valid for patients 18 years and older.        GFR    Date Value Ref Range Status   05/22/2022 27  Final   05/21/2022 28  Final   05/21/2022 28  Final     Magnesium Date Value Ref Range Status   05/21/2022 2.2 1.6 - 2.6 mg/dL Final   05/17/2022 1.9 1.6 - 2.6 mg/dL Final   04/26/2022 2.1 1.6 - 2.6 mg/dL Final     Phosphorus   Date Value Ref Range Status   05/21/2022 3.7 2.5 - 4.5 mg/dL Final   03/16/2022 3.0 2.5 - 4.5 mg/dL Final   03/15/2022 3.4 2.5 - 4.5 mg/dL Final     No results for input(s): PH, PO2, PCO2, HCO3, BE, O2SAT in the last 72 hours. RADIOLOGY:  XR CHEST (2 VW)   Final Result   No acute process. Stable cardiomegaly. NM LUNG VENT/PERFUSION (VQ)   Final Result   Very low probability for pulmonary embolism. US DUP LOWER EXTREMITIES BILATERAL VENOUS   Final Result   No evidence of DVT in either lower extremity. XR CHEST PORTABLE   Final Result   1. Cardiomegaly. There are no findings of failure or pneumonia. PROBLEM LIST:  Principal Problem:    Hypoxia  Active Problems:    Acute exacerbation of chronic obstructive pulmonary disease (COPD) ([facility]  Resolved Problems:    * No resolved hospital problems. *      IMPRESSION:  1. COPD exacerbation  2. History of renal transplant  3. Chronic kidney disease  4. History of prior DVT on apixaban    PLAN:  1. Patient needs to be mobilized and ambulated  2. Taper steroids further tomorrow  3. Check serum aldolase in a.m.           Electronically signed by Emani Yung MD on 5/22/2022 at 2:00 PM

## 2022-05-22 NOTE — PROGRESS NOTES
[facility] MD Sabrina Reynolds MD Bevely Lord, MD Myla Munster, MD Durward Kand, CNP   Soumya Aviles, ZAIRE  Progress Note    5/22/2022    SUBJECTIVE:   5/22:(-) c/o's  (-) sob/platt/cp/palp Appetite ok  Doing remarkably better today with no complaints of shortness of breath or chest discomfort. PROBLEM LIST:    Principal Problem:    Hypoxia  Active Problems:    Acute exacerbation of chronic obstructive pulmonary disease (COPD) ([address]  Resolved Problems:    * No resolved hospital problems. *         DIET:    ADULT DIET; Regular;  Low Sodium (2 gm)     MEDS (scheduled):    predniSONE  20 mg Oral BID    sodium zirconium cyclosilicate  10 g Oral Daily    amoxicillin-clavulanate  1 tablet Oral 2 times per day    magnesium oxide  400 mg Oral Daily    ciprofloxacin  500 mg Oral Daily    ipratropium-albuterol  1 ampule Inhalation 4x Daily    acyclovir  400 mg Oral BID    allopurinol  100 mg Oral Daily    atorvastatin  10 mg Oral Nightly    budesonide  500 mcg Nebulization BID    cloZAPine  50 mg Oral Nightly    cycloSPORINE  100 mg Oral BID    desvenlafaxine succinate  50 mg Oral QPM    folic acid  1 mg Oral Daily    guaiFENesin  600 mg Oral BID    levothyroxine  50 mcg Oral Daily    metoprolol tartrate  12.5 mg Oral BID    miconazole   Topical BID    pantoprazole  40 mg Oral QAM AC    sodium bicarbonate  650 mg Oral Daily    vancomycin  125 mg Oral TID    Vitamin D  1,000 Units Oral Daily    apixaban  5 mg Oral BID    mycophenolate  1,000 mg Oral BID       MEDS (infusions):   sodium chloride 50 mL/hr at 05/22/22 1016       MEDS (prn):  acetaminophen, diphenhydrAMINE, midodrine, simethicone    PHYSICAL EXAM:     Patient Vitals for the past 24 hrs:   BP Temp Temp src Pulse Resp SpO2   05/22/22 0730 (!) 151/86 97.9 °F (36.6 °C) Oral 83 18 93 %   05/21/22 2145 (!) 153/72 98.5 °F (36.9 °C) Oral 98 19 (!) 89 %   @      Intake/Output Summary (Last 24 hours) at 5/22/2022 1342  Last data filed at 5/21/2022 2125  Gross per 24 hour   Intake 540 ml   Output 750 ml   Net -210 ml         Wt Readings from Last 3 Encounters:   05/17/22 200 lb (90.7 kg)   04/26/22 172 lb 3.2 oz (78.1 kg)   03/15/22 226 lb (102.5 kg)       Constitutional:  in no acute distress  HEENT: NC/AT, EOMI, sclera and conjunctiva are clear and anicteric, mucus membranes moist  Neck: Trachea midline, no JVD  Cardiovascular: S1, S2 regular rhythm, no murmur,or rub  Respiratory:  No crackles, no wheeze  Gastrointestinal:  Soft, nontender, nondistended, NABS  Ext: no edema, feet warm  Skin: dry, no rash  Neuro: awake, alert, interactive      DATA:    Recent Labs     05/20/22  0819 05/21/22  0633 05/22/22  0626   WBC 8.1 8.1 9.6   HGB 9.6* 9.1* 9.4*   HCT 31.5* 30.7* 30.0*   .8* 111.6* 107.1*    178 173     Recent Labs     05/20/22  0819 05/20/22  0819 05/21/22  0633 05/21/22  1825 05/22/22  0626      < > 137 136 137   K 4.8   < > 5.7* 5.6* 5.3*   CL 98   < > 98 97* 98   CO2 28   < > 27 26 29   MG  --   --  2.2  --   --    PHOS  --   --  3.7  --   --    BUN 31*   < > 39* 42* 44*   CREATININE 1.9*   < > 2.1* 2.1* 2.2*   ALT 9  --  17  --  28   AST 14  --  23  --  32*   BILITOT 0.5  --  0.4  --  0.4   ALKPHOS 70  --  68  --  67    < > = values in this interval not displayed. Lab Results   Component Value Date    LABPROT 0.2 03/12/2022    LABPROT 0.2 03/12/2022       ASSESSMENT / RECOMMENDATIONS:    ASSESSMENT AND PLAN:  1.  COPD exacerbation, probably contributing to her hypoxia and we will  place her on DuoNeb nebulizer every 6 hours and will avoid antibiotic if  possible as she has very minimal cough and because of her history of C.  difficile, we will get a V/Q scan to rule out PE since her D-dimer was  slightly elevated. 2.  Acute-on-chronic renal failure.  Her creatinine was 2.  Her baseline  was 1.5 to 1.6.  We will hold her Lasix and monitor her creatinine.    History of renal failure, status post renal transplant in 2015. BUN today is 44 and creatinine is 2.2  3.  History of left femoral and popliteal vein DVT.  We will resume her  Eliquis for at least six months. Serge Dietz was diagnosed in February. 4.  History of SVT and tachycardia.  She is on metoprolol. 5.  Hyperlipidemia.  Resume her statin. 6.  Hypothyroidism, resume her Synthroid. 7.  History of DVT in the left to mid and distal femoral vein and  popliteal vein status post IVC filter placed in 02/2022 for possible GI  bleed at that time. 8.  Anemia of chronic disease. 9.  History of C. difficile colitis. 10.  History of ESBL in the urine.  We will check urinalysis,  microscopy, and culture and sensitivity. We will follow evaluation on daily basis reviewing the patient and assessing clinical condition.   Electronically signed by Sahara Sagastume MD on 5/22/2022 at 1:42 PM

## 2022-05-22 NOTE — PROGRESS NOTES
Progress Note  Date:2022       Room:04350435-01  Patient Catarino Live     YOB: 1955     Age:67 y.o. Subjective    Subjective:  Symptoms:  Stable. (Breathing improved , was seen earlier , was sleeping and easily aroused ). Diet:  Adequate intake. Activity level: Impaired due to weakness. Pain:  She reports no pain. Review of Systems  Objective         Vitals Last 24 Hours:  TEMPERATURE:  Temp  Av.2 °F (36.8 °C)  Min: 97.9 °F (36.6 °C)  Max: 98.5 °F (36.9 °C)  RESPIRATIONS RANGE: Resp  Av.5  Min: 18  Max: 19  PULSE OXIMETRY RANGE: SpO2  Av %  Min: 89 %  Max: 93 %  PULSE RANGE: Pulse  Av.5  Min: 83  Max: 98  BLOOD PRESSURE RANGE: Systolic (19JUX), ABD:441 , Min:151 , AVS:816   ; Diastolic (09ZFH), DRT:59, Min:72, Max:86    I/O (24Hr): Intake/Output Summary (Last 24 hours) at 2022 1517  Last data filed at 2022 2125  Gross per 24 hour   Intake --   Output 750 ml   Net -750 ml     Objective:  General Appearance:  Comfortable. Vital signs: (most recent): Blood pressure (!) 151/86, pulse 83, temperature 97.9 °F (36.6 °C), temperature source Oral, resp. rate 18, height 5' 2\" (1.575 m), weight 200 lb (90.7 kg), SpO2 93 %. Vital signs are normal.    Output: Producing urine. HEENT: Normal HEENT exam.    Lungs:  (Decrease air entry bilateral )  Heart: Normal rate. Regular rhythm. S1 normal.  Positive for murmur. Abdomen: Abdomen is soft and distended. Bowel sounds are normal.   There is no abdominal tenderness. Extremities: There is no dependent edema. Pulses: Distal pulses are intact. Neurological: Patient is alert and oriented to person, place and time.       Labs/Imaging/Diagnostics    Labs:  CBC:  Recent Labs     22  0819 22  0633 22  0626   WBC 8.1 8.1 9.6   RBC 2.87* 2.75* 2.80*   HGB 9.6* 9.1* 9.4*   HCT 31.5* 30.7* 30.0*   .8* 111.6* 107.1*   RDW 16.7* 16.9* 16.9*    178 173 CHEMISTRIES:  Recent Labs     05/21/22  0633 05/21/22  1825 05/22/22  0626    136 137   K 5.7* 5.6* 5.3*   CL 98 97* 98   CO2 27 26 29   BUN 39* 42* 44*   CREATININE 2.1* 2.1* 2.2*   GLUCOSE 172* 197* 118*   PHOS 3.7  --   --    MG 2.2  --   --      PT/INR:  No results for input(s): PROTIME, INR in the last 72 hours. APTT:  No results for input(s): APTT in the last 72 hours. LIVER PROFILE:  Recent Labs     05/20/22  0819 05/21/22  0633 05/22/22  0626   AST 14 23 32*   ALT 9 17 28   BILITOT 0.5 0.4 0.4   ALKPHOS 70 68 67       Imaging Last 24 Hours:  NM LUNG VENT/PERFUSION (VQ)    Result Date: 5/18/2022  EXAMINATION: NUCLEAR MEDICINE VENTILATION PERFUSION SCAN. 5/18/2022 TECHNIQUE: 19.5 millicuries aerosolized Tc99m DTPA was administered via mask prior to planar imaging of the lungs in multiple projections. Then, 5.4 millicuries of Tc 44Y MAA was administered intravenously prior to planar imaging of the lungs in similar projections. COMPARISON: Chest radiograph 05/17/2022. HISTORY: ORDERING SYSTEM PROVIDED HISTORY: R/O PE TECHNOLOGIST PROVIDED HISTORY: Reason for exam:->R/O PE What reading provider will be dictating this exam?->CRC FINDINGS: PERFUSION: Mildly heterogeneous distribution of radiotracer. The perfusion is overall improved compared to the ventilation. No unmatched perfusion defects. VENTILATION: Heterogeneous distribution of radiotracer. CHEST RADIOGRAPH: No focal areas of consolidation or significant effusions on recent chest radiograph. Very low probability for pulmonary embolism. Assessment//Plan           Hospital Problems           Last Modified POA    * (Principal) Hypoxia 5/17/2022 Yes    Acute exacerbation of chronic obstructive pulmonary disease (COPD) ([facility] [address] 5/19/2022 Yes        Assessment:    Condition: In stable condition. (COPD exacerbation , on oral prednisone  continue aerosol treatment , CXR no acute changes   V/q scan low probability .      Acute on chronic renal failure , creatinine is slightly worse 2.2   ,  Lasix on hold  for now , nephrology following , I will start her on iv fluids , slow hydration     Hyperkalemia  Add  lokelma 10 mg  Daily     H/o DVT 2/2022 on eliquis till end of august , s/p IVC filter . ESBL in the urine on augmentin and cipro on the urine done in the NH suppose to have it for 2 weeks , 5 days left     Anemia of chronic disease . Hypothyroidism , continue synthroid.     h/o c diff colitis  On vancomycin tapering dose      ).        Electronically signed by Ramez Peter MD on 5/19/22 at 7:40 PM EDT

## 2022-05-23 LAB
ALBUMIN SERPL-MCNC: 3.1 G/DL (ref 3.5–5.2)
ALP BLD-CCNC: 68 U/L (ref 35–104)
ALT SERPL-CCNC: 39 U/L (ref 0–32)
ANION GAP SERPL CALCULATED.3IONS-SCNC: 10 MMOL/L (ref 7–16)
AST SERPL-CCNC: 38 U/L (ref 0–31)
BASOPHILS ABSOLUTE: 0 E9/L (ref 0–0.2)
BASOPHILS RELATIVE PERCENT: 0.1 % (ref 0–2)
BILIRUB SERPL-MCNC: 0.3 MG/DL (ref 0–1.2)
BUN BLDV-MCNC: 46 MG/DL (ref 6–23)
CALCIUM SERPL-MCNC: 9.9 MG/DL (ref 8.6–10.2)
CHLORIDE BLD-SCNC: 100 MMOL/L (ref 98–107)
CO2: 27 MMOL/L (ref 22–29)
CREAT SERPL-MCNC: 1.9 MG/DL (ref 0.5–1)
EOSINOPHILS ABSOLUTE: 0 E9/L (ref 0.05–0.5)
EOSINOPHILS RELATIVE PERCENT: 0 % (ref 0–6)
GFR AFRICAN AMERICAN: 32
GFR NON-AFRICAN AMERICAN: 26 ML/MIN/1.73
GLUCOSE BLD-MCNC: 159 MG/DL (ref 74–99)
HCT VFR BLD CALC: 32.1 % (ref 34–48)
HEMOGLOBIN: 9.8 G/DL (ref 11.5–15.5)
LYMPHOCYTES ABSOLUTE: 0.49 E9/L (ref 1.5–4)
LYMPHOCYTES RELATIVE PERCENT: 6.2 % (ref 20–42)
MCH RBC QN AUTO: 32.8 PG (ref 26–35)
MCHC RBC AUTO-ENTMCNC: 30.5 % (ref 32–34.5)
MCV RBC AUTO: 107.4 FL (ref 80–99.9)
MONOCYTES ABSOLUTE: 0.24 E9/L (ref 0.1–0.95)
MONOCYTES RELATIVE PERCENT: 2.7 % (ref 2–12)
NEUTROPHILS ABSOLUTE: 7.37 E9/L (ref 1.8–7.3)
NEUTROPHILS RELATIVE PERCENT: 91.2 % (ref 43–80)
PDW BLD-RTO: 16.9 FL (ref 11.5–15)
PLATELET # BLD: 184 E9/L (ref 130–450)
PMV BLD AUTO: 12.4 FL (ref 7–12)
POTASSIUM SERPL-SCNC: 4.8 MMOL/L (ref 3.5–5)
RBC # BLD: 2.99 E12/L (ref 3.5–5.5)
SARS-COV-2, NAAT: NOT DETECTED
SODIUM BLD-SCNC: 137 MMOL/L (ref 132–146)
TOTAL PROTEIN: 5.9 G/DL (ref 6.4–8.3)
WBC # BLD: 8.1 E9/L (ref 4.5–11.5)

## 2022-05-23 PROCEDURE — 82085 ASSAY OF ALDOLASE: CPT

## 2022-05-23 PROCEDURE — 6370000000 HC RX 637 (ALT 250 FOR IP): Performed by: INTERNAL MEDICINE

## 2022-05-23 PROCEDURE — 97530 THERAPEUTIC ACTIVITIES: CPT

## 2022-05-23 PROCEDURE — 97110 THERAPEUTIC EXERCISES: CPT

## 2022-05-23 PROCEDURE — 1200000000 HC SEMI PRIVATE

## 2022-05-23 PROCEDURE — 80053 COMPREHEN METABOLIC PANEL: CPT

## 2022-05-23 PROCEDURE — 36415 COLL VENOUS BLD VENIPUNCTURE: CPT

## 2022-05-23 PROCEDURE — 6360000002 HC RX W HCPCS: Performed by: INTERNAL MEDICINE

## 2022-05-23 PROCEDURE — 85025 COMPLETE CBC W/AUTO DIFF WBC: CPT

## 2022-05-23 PROCEDURE — 87635 SARS-COV-2 COVID-19 AMP PRB: CPT

## 2022-05-23 PROCEDURE — 2580000003 HC RX 258: Performed by: INTERNAL MEDICINE

## 2022-05-23 PROCEDURE — 94640 AIRWAY INHALATION TREATMENT: CPT

## 2022-05-23 RX ORDER — PREDNISONE 10 MG/1
10 TABLET ORAL 2 TIMES DAILY
Status: DISCONTINUED | OUTPATIENT
Start: 2022-05-23 | End: 2022-05-24 | Stop reason: HOSPADM

## 2022-05-23 RX ADMIN — ACYCLOVIR 400 MG: 400 TABLET ORAL at 21:50

## 2022-05-23 RX ADMIN — PREDNISONE 20 MG: 20 TABLET ORAL at 09:53

## 2022-05-23 RX ADMIN — GUAIFENESIN 600 MG: 600 TABLET, EXTENDED RELEASE ORAL at 09:53

## 2022-05-23 RX ADMIN — SODIUM ZIRCONIUM CYCLOSILICATE 10 G: 5 POWDER, FOR SUSPENSION ORAL at 09:52

## 2022-05-23 RX ADMIN — PREDNISONE 10 MG: 10 TABLET ORAL at 21:50

## 2022-05-23 RX ADMIN — AMOXICILLIN AND CLAVULANATE POTASSIUM 1 TABLET: 500; 125 TABLET, FILM COATED ORAL at 09:53

## 2022-05-23 RX ADMIN — ALLOPURINOL 100 MG: 100 TABLET ORAL at 09:53

## 2022-05-23 RX ADMIN — CLOZAPINE 50 MG: 25 TABLET ORAL at 22:55

## 2022-05-23 RX ADMIN — ANTI-FUNGAL POWDER MICONAZOLE NITRATE TALC FREE: 1.42 POWDER TOPICAL at 11:08

## 2022-05-23 RX ADMIN — IPRATROPIUM BROMIDE AND ALBUTEROL SULFATE 1 AMPULE: .5; 2.5 SOLUTION RESPIRATORY (INHALATION) at 19:02

## 2022-05-23 RX ADMIN — IPRATROPIUM BROMIDE AND ALBUTEROL SULFATE 1 AMPULE: .5; 2.5 SOLUTION RESPIRATORY (INHALATION) at 06:47

## 2022-05-23 RX ADMIN — Medication 125 MG: at 09:56

## 2022-05-23 RX ADMIN — SODIUM BICARBONATE 650 MG: 650 TABLET ORAL at 09:53

## 2022-05-23 RX ADMIN — ATORVASTATIN CALCIUM 10 MG: 10 TABLET, FILM COATED ORAL at 21:50

## 2022-05-23 RX ADMIN — MYCOPHENOLATE MOFETIL 1000 MG: 250 CAPSULE ORAL at 21:46

## 2022-05-23 RX ADMIN — METOPROLOL TARTRATE 12.5 MG: 25 TABLET, FILM COATED ORAL at 09:53

## 2022-05-23 RX ADMIN — ACYCLOVIR 400 MG: 400 TABLET ORAL at 09:53

## 2022-05-23 RX ADMIN — APIXABAN 5 MG: 5 TABLET, FILM COATED ORAL at 21:50

## 2022-05-23 RX ADMIN — PANTOPRAZOLE SODIUM 40 MG: 40 TABLET, DELAYED RELEASE ORAL at 06:40

## 2022-05-23 RX ADMIN — AMOXICILLIN AND CLAVULANATE POTASSIUM 1 TABLET: 500; 125 TABLET, FILM COATED ORAL at 21:54

## 2022-05-23 RX ADMIN — CYCLOSPORINE 100 MG: 25 CAPSULE, GELATIN COATED ORAL at 09:52

## 2022-05-23 RX ADMIN — METOPROLOL TARTRATE 12.5 MG: 25 TABLET, FILM COATED ORAL at 21:49

## 2022-05-23 RX ADMIN — MYCOPHENOLATE MOFETIL 1000 MG: 250 CAPSULE ORAL at 09:52

## 2022-05-23 RX ADMIN — BUDESONIDE 500 MCG: 0.5 INHALANT RESPIRATORY (INHALATION) at 06:47

## 2022-05-23 RX ADMIN — Medication 125 MG: at 15:32

## 2022-05-23 RX ADMIN — DESVENLAFAXINE 50 MG: 50 TABLET, EXTENDED RELEASE ORAL at 18:17

## 2022-05-23 RX ADMIN — Medication 1000 UNITS: at 09:53

## 2022-05-23 RX ADMIN — SODIUM CHLORIDE: 9 INJECTION, SOLUTION INTRAVENOUS at 04:01

## 2022-05-23 RX ADMIN — Medication 400 MG: at 09:53

## 2022-05-23 RX ADMIN — FOLIC ACID 1 MG: 1 TABLET ORAL at 09:53

## 2022-05-23 RX ADMIN — IPRATROPIUM BROMIDE AND ALBUTEROL SULFATE 1 AMPULE: .5; 2.5 SOLUTION RESPIRATORY (INHALATION) at 10:25

## 2022-05-23 RX ADMIN — CYCLOSPORINE 100 MG: 25 CAPSULE, GELATIN COATED ORAL at 21:46

## 2022-05-23 RX ADMIN — CIPROFLOXACIN HYDROCHLORIDE 500 MG: 500 TABLET, FILM COATED ORAL at 09:53

## 2022-05-23 RX ADMIN — BUDESONIDE 500 MCG: 0.5 INHALANT RESPIRATORY (INHALATION) at 19:02

## 2022-05-23 RX ADMIN — GUAIFENESIN 600 MG: 600 TABLET, EXTENDED RELEASE ORAL at 21:50

## 2022-05-23 RX ADMIN — APIXABAN 5 MG: 5 TABLET, FILM COATED ORAL at 09:52

## 2022-05-23 RX ADMIN — LEVOTHYROXINE SODIUM 50 MCG: 0.05 TABLET ORAL at 09:53

## 2022-05-23 ASSESSMENT — PAIN SCALES - GENERAL: PAINLEVEL_OUTOF10: 0

## 2022-05-23 NOTE — PLAN OF CARE
Problem: Skin/Tissue Integrity  Goal: Absence of new skin breakdown  Description: 1. Monitor for areas of redness and/or skin breakdown  2. Assess vascular access sites hourly  3. Every 4-6 hours minimum:  Change oxygen saturation probe site  4. Every 4-6 hours:  If on nasal continuous positive airway pressure, respiratory therapy assess nares and determine need for appliance change or resting period.   Outcome: Progressing     Problem: ABCDS Injury Assessment  Goal: Absence of physical injury  Outcome: Progressing     Problem: Pain  Goal: Verbalizes/displays adequate comfort level or baseline comfort level  Outcome: Progressing     Problem: Chronic Conditions and Co-morbidities  Goal: Patient's chronic conditions and co-morbidity symptoms are monitored and maintained or improved  Outcome: Progressing     Problem: Safety - Adult  Goal: Free from fall injury  Outcome: Progressing

## 2022-05-23 NOTE — PROGRESS NOTES
[facility] MD Elsa Florentino MD Drenda Schneider, MD Narciso Cotton, MD Jay Ponce, RAHAT Aviles, ZAIRE  Progress Note    5/23/2022    SUBJECTIVE:   5/22:(-) c/o's  (-) sob/platt/cp/palp Appetite ok  Doing remarkably better today with no complaints of shortness of breath or chest discomfort. 5/23; overall doing well no new complaints continue same therapy, patient was anxious to touch base with [facility] to notify them that she missing her appointment with the transplant office  PROBLEM LIST:    Principal Problem:    Hypoxia  Active Problems:    Acute exacerbation of chronic obstructive pulmonary disease (COPD) ([address]  Resolved Problems:    * No resolved hospital problems. *         DIET:    ADULT DIET; Regular;  Low Sodium (2 gm)     MEDS (scheduled):    predniSONE  20 mg Oral BID    sodium zirconium cyclosilicate  10 g Oral Daily    amoxicillin-clavulanate  1 tablet Oral 2 times per day    magnesium oxide  400 mg Oral Daily    ciprofloxacin  500 mg Oral Daily    ipratropium-albuterol  1 ampule Inhalation 4x Daily    acyclovir  400 mg Oral BID    allopurinol  100 mg Oral Daily    atorvastatin  10 mg Oral Nightly    budesonide  500 mcg Nebulization BID    cloZAPine  50 mg Oral Nightly    cycloSPORINE  100 mg Oral BID    desvenlafaxine succinate  50 mg Oral QPM    folic acid  1 mg Oral Daily    guaiFENesin  600 mg Oral BID    levothyroxine  50 mcg Oral Daily    metoprolol tartrate  12.5 mg Oral BID    miconazole   Topical BID    pantoprazole  40 mg Oral QAM AC    sodium bicarbonate  650 mg Oral Daily    vancomycin  125 mg Oral TID    Vitamin D  1,000 Units Oral Daily    apixaban  5 mg Oral BID    mycophenolate  1,000 mg Oral BID       MEDS (infusions):   sodium chloride 50 mL/hr at 05/23/22 9359       MEDS (prn):  acetaminophen, diphenhydrAMINE, midodrine, simethicone    PHYSICAL EXAM:     Patient Vitals for the past 24 hrs:   BP Temp Temp src Pulse Resp SpO2   05/23/22 1025 -- -- -- -- -- 93 %   05/23/22 0915 (!) 158/79 98 °F (36.7 °C) Oral 88 18 90 %   05/23/22 0647 -- -- -- -- -- 94 %   05/22/22 2100 (!) 149/96 -- Oral 88 16 94 %   05/22/22 1636 (!) 135/93 97.3 °F (36.3 °C) Oral 76 16 95 %   @      Intake/Output Summary (Last 24 hours) at 5/23/2022 1305  Last data filed at 5/23/2022 4371  Gross per 24 hour   Intake 1013.81 ml   Output 1575 ml   Net -561.19 ml         Wt Readings from Last 3 Encounters:   05/17/22 200 lb (90.7 kg)   04/26/22 172 lb 3.2 oz (78.1 kg)   03/15/22 226 lb (102.5 kg)       Constitutional:  in no acute distress  HEENT: NC/AT, EOMI, sclera and conjunctiva are clear and anicteric, mucus membranes moist  Neck: Trachea midline, no JVD  Cardiovascular: S1, S2 regular rhythm, no murmur,or rub  Respiratory:  No crackles, no wheeze  Gastrointestinal:  Soft, nontender, nondistended, NABS  Ext: no edema, feet warm  Skin: dry, no rash  Neuro: awake, alert, interactive      DATA:    Recent Labs     05/21/22  0633 05/22/22  0626 05/23/22  0935   WBC 8.1 9.6 8.1   HGB 9.1* 9.4* 9.8*   HCT 30.7* 30.0* 32.1*   .6* 107.1* 107.4*    173 184     Recent Labs     05/21/22  0633 05/21/22  0633 05/21/22  1825 05/22/22  0626 05/23/22  0935      < > 136 137 137   K 5.7*   < > 5.6* 5.3* 4.8   CL 98   < > 97* 98 100   CO2 27   < > 26 29 27   MG 2.2  --   --   --   --    PHOS 3.7  --   --   --   --    BUN 39*   < > 42* 44* 46*   CREATININE 2.1*   < > 2.1* 2.2* 1.9*   ALT 17  --   --  28 39*   AST 23  --   --  32* 38*   BILITOT 0.4  --   --  0.4 0.3   ALKPHOS 68  --   --  67 68    < > = values in this interval not displayed.        Lab Results   Component Value Date    LABPROT 0.2 03/12/2022    LABPROT 0.2 03/12/2022       ASSESSMENT / RECOMMENDATIONS:    ASSESSMENT AND PLAN:  1.  COPD exacerbation, probably contributing to her hypoxia and we will  place her on DuoNeb nebulizer every 6 hours and will avoid antibiotic if  possible as she has very minimal cough and because of her history of C.  difficile, we will get a V/Q scan to rule out PE since her D-dimer was  slightly elevated. 2.  Acute-on-chronic renal failure.  Her creatinine was 2.  Her baseline  was 1.5 to 1.6.  We will hold her Lasix and monitor her creatinine. History of renal failure, status post renal transplant in 2015. BUN today is 44 and creatinine is 2.2  3.  History of left femoral and popliteal vein DVT.  We will resume her  Eliquis for at least six months. Esther Watt was diagnosed in February. 4.  History of SVT and tachycardia.  She is on metoprolol. 5.  Hyperlipidemia.  Resume her statin. 6.  Hypothyroidism, resume her Synthroid. 7.  History of DVT in the left to mid and distal femoral vein and  popliteal vein status post IVC filter placed in 02/2022 for possible GI  bleed at that time. 8.  Anemia of chronic disease. 9.  History of C. difficile colitis. 10.  History of ESBL in the urine.  We will check urinalysis,  microscopy, and culture and sensitivity. We will follow evaluation on daily basis reviewing the patient and assessing clinical condition.   Electronically signed by Ed Grigsby MD on 5/23/2022 at 1:05 PM

## 2022-05-23 NOTE — PROGRESS NOTES
Progress Note  Date:2022       Room:[facility]-  Patient Inga Coyle     YOB: 1955     Age:67 y.o. Subjective    Subjective:  Symptoms:  Stable. (Breathing better , and was sleeping easily aroused ). Diet:  Adequate intake. Activity level: Impaired due to weakness. Pain:  She reports no pain. Review of Systems  Objective         Vitals Last 24 Hours:  TEMPERATURE:  Temp  Av °F (36.7 °C)  Min: 98 °F (36.7 °C)  Max: 98 °F (36.7 °C)  RESPIRATIONS RANGE: Resp  Av  Min: 16  Max: 18  PULSE OXIMETRY RANGE: SpO2  Av.8 %  Min: 90 %  Max: 94 %  PULSE RANGE: Pulse  Av  Min: 88  Max: 88  BLOOD PRESSURE RANGE: Systolic (48AYT), CRV:743 , Min:149 , ZGP:065   ; Diastolic (67YOS), JUSTINO:97, Min:79, Max:96    I/O (24Hr): Intake/Output Summary (Last 24 hours) at 2022 1757  Last data filed at 2022 1120  Gross per 24 hour   Intake 1013.81 ml   Output 1400 ml   Net -386.19 ml     Objective:  General Appearance:  Comfortable. Vital signs: (most recent): Blood pressure (!) 158/79, pulse 88, temperature 98 °F (36.7 °C), temperature source Oral, resp. rate 18, height 5' 2\" (1.575 m), weight 200 lb (90.7 kg), SpO2 93 %. Vital signs are normal.    Output: Producing urine. HEENT: Normal HEENT exam.    Lungs:  (Decrease air entry bilateral )  Heart: Normal rate. Regular rhythm. S1 normal.  Positive for murmur. Abdomen: Abdomen is soft and distended. Bowel sounds are normal.   There is no abdominal tenderness. Extremities: There is no dependent edema. (Edema +1 both LL)  Pulses: Distal pulses are intact. Neurological: Patient is alert and oriented to person, place and time.       Labs/Imaging/Diagnostics    Labs:  CBC:  Recent Labs     22  0633 22  0626 22  0935   WBC 8.1 9.6 8.1   RBC 2.75* 2.80* 2.99*   HGB 9.1* 9.4* 9.8*   HCT 30.7* 30.0* 32.1*   .6* 107.1* 107.4*   RDW 16.9* 16.9* 16.9*    173 184 CHEMISTRIES:  Recent Labs     05/21/22  0633 05/21/22  0633 05/21/22  1825 05/22/22  0626 05/23/22  0935      < > 136 137 137   K 5.7*   < > 5.6* 5.3* 4.8   CL 98   < > 97* 98 100   CO2 27   < > 26 29 27   BUN 39*   < > 42* 44* 46*   CREATININE 2.1*   < > 2.1* 2.2* 1.9*   GLUCOSE 172*   < > 197* 118* 159*   PHOS 3.7  --   --   --   --    MG 2.2  --   --   --   --     < > = values in this interval not displayed. PT/INR:  No results for input(s): PROTIME, INR in the last 72 hours. APTT:  No results for input(s): APTT in the last 72 hours. LIVER PROFILE:  Recent Labs     05/21/22  0633 05/22/22  0626 05/23/22  0935   AST 23 32* 38*   ALT 17 28 39*   BILITOT 0.4 0.4 0.3   ALKPHOS 68 67 68       Imaging Last 24 Hours:  NM LUNG VENT/PERFUSION (VQ)    Result Date: 5/18/2022  EXAMINATION: NUCLEAR MEDICINE VENTILATION PERFUSION SCAN. 5/18/2022 TECHNIQUE: 99.4 millicuries aerosolized Tc99m DTPA was administered via mask prior to planar imaging of the lungs in multiple projections. Then, 5.4 millicuries of Tc 74P MAA was administered intravenously prior to planar imaging of the lungs in similar projections. COMPARISON: Chest radiograph 05/17/2022. HISTORY: ORDERING SYSTEM PROVIDED HISTORY: R/O PE TECHNOLOGIST PROVIDED HISTORY: Reason for exam:->R/O PE What reading provider will be dictating this exam?->CRC FINDINGS: PERFUSION: Mildly heterogeneous distribution of radiotracer. The perfusion is overall improved compared to the ventilation. No unmatched perfusion defects. VENTILATION: Heterogeneous distribution of radiotracer. CHEST RADIOGRAPH: No focal areas of consolidation or significant effusions on recent chest radiograph. Very low probability for pulmonary embolism.      Assessment//Plan           Hospital Problems           Last Modified POA    * (Principal) Hypoxia 5/17/2022 Yes    Acute exacerbation of chronic obstructive pulmonary disease (COPD) ([facility] [address] 5/19/2022 Yes        Assessment:    Condition: In stable condition. (COPD exacerbation , on oral prednisone  continue aerosol treatment , CXR no acute changes , decrease prednisone dose   V/q scan low probability . Acute on chronic renal failure , creatinine better with hydration    ,  Lasix on hold  for now , nephrology following ,      Hyperkalemia  , potassium is within normal      H/o DVT 2/2022 on eliquis till end of august , s/p IVC filter . ESBL in the urine on augmentin and cipro on the urine done in the NH suppose to have it for 2 weeks , 4 days left     Anemia of chronic disease . Hypothyroidism , continue synthroid.     h/o c diff colitis  On vancomycin tapering dose    Possible discharge in am     ).        Electronically signed by John Barber MD on 5/19/22 at 7:40 PM EDT

## 2022-05-23 NOTE — CARE COORDINATION
Ss note: 5/23/2022.12:37 PM Neg Rapid covid result today. Plan is to return to [facility] skilled rehab at discharge, pt is bedhold. Pts son Randal Alcaraz is primary decision maker if pt becomes confused. Will need signed GINA.  AG Alvarez

## 2022-05-23 NOTE — PLAN OF CARE
Problem: Skin/Tissue Integrity  Goal: Absence of new skin breakdown  Description: 1. Monitor for areas of redness and/or skin breakdown  2. Assess vascular access sites hourly  3. Every 4-6 hours minimum:  Change oxygen saturation probe site  4. Every 4-6 hours:  If on nasal continuous positive airway pressure, respiratory therapy assess nares and determine need for appliance change or resting period.   Outcome: Progressing     Problem: ABCDS Injury Assessment  Goal: Absence of physical injury  Outcome: Progressing     Problem: Pain  Goal: Verbalizes/displays adequate comfort level or baseline comfort level  Outcome: Progressing     Problem: Safety - Adult  Goal: Free from fall injury  Outcome: Progressing

## 2022-05-23 NOTE — PROGRESS NOTES
Physical Therapy Treatment Note/Plan of Care    Room #:  3704/6337-30  Patient Name: Mary Baron  YOB: 1955  MRN: [id number]    Date of Service: 5/23/2022     Tentative placement recommendation: Subacute rehab  Equipment recommendation:  To be determined      Evaluating Physical Therapist: Kishore Mcnair, [facility] #[id number]      Specific Provider Orders/Date/Referring Provider :   05/18/22 0900   PT eval and treat Start: 05/18/22 0900, End: 05/18/22 0900, ONE TIME, Standing Count: 1 Occurrences, Sergo Hoang MD      Admitting Diagnosis:   Hypoxia [R09.02]  Chronic renal insufficiency, stage 3 (moderate) ([address] [N18.30]  Dyspnea, unspecified type [R06.00]  Acute exacerbation of chronic obstructive pulmonary disease (COPD) ([address] [J44.1]      Surgery: none  Visit Diagnoses       Codes    Dyspnea, unspecified type    -  Primary R06.00    Chronic renal insufficiency, stage 3 (moderate) (HCC)     N18.30          Patient Active Problem List   Diagnosis    Peripheral vascular disease ([address]    Depression    Clotting disorder ([address]    Abnormal EKG    Cancer ([address]    Over weight    Kidney transplanted    ESRD (end stage renal disease) ([address]    Non morbid obesity due to excess calories    Hypertension    Leg swelling    Lymphedema of both lower extremities    Acute gout involving toe of right foot    Sepsis secondary to UTI ([address]    Acute kidney injury superimposed on CKD ([address]    Thyroid disease    Acute on chronic combined systolic (congestive) and diastolic (congestive) heart failure ([address]    Sepsis ([address]    Acute renal failure ([address]    CECILY (acute kidney injury) ([address]    Ischemic bowel disease ([address]    Altered mental status    Coma ([address]    Acute respiratory failure with hypoxia ([address]    Hypoxia    Acute metabolic encephalopathy    Acute deep vein thrombosis (DVT) of femoral vein of left lower extremity (HCC)    Anemia    Positive blood culture    Difficult intravenous access    Paroxysmal atrial fibrillation (HCC)    Hypotension    LBBB (left bundle branch block)    History of DVT (deep vein thrombosis)    Chronic anticoagulation    Acute [facility] failure with hypoxemia ([facility])    Acute exacerbation of chronic obstructive pulmonary disease (COPD) ([facility])        ASSESSMENT of Current Deficits Patient exhibits decreased strength, balance and endurance impairing functional mobility, transfers and gait . Patient's fear of falling and knee buckling in previous tx sessions limiting her progress with therapy and her gait distance. Pt practiced static standing endurance x 3 and stood for 1 minute and 5 seconds, 1 minute and 15 seconds, and 1 minute and 18 seconds. Pt was limited due to impaired strength and endurance with B LE's. The patient will benefit from continued skilled therapy to increase strength and improve balance for safe functional mobility, to decrease risk of falls, and to meet goals at discharge. PHYSICAL THERAPY  PLAN OF CARE       Physical therapy plan of care is established based on physician order,  patient diagnosis and clinical assessment    Current Treatment Recommendations:    -Bed Mobility: Lower extremity exercises   -Sitting Balance: Incorporate reaching activities to activate trunk muscles   -Standing Balance: Perform strengthening exercises in standing to promote motor control with or without upper extremity support   -Transfers: Provide instruction on proper hand and foot position for adequate transfer of weight onto lower extremities and use of gait device if needed and Cues for hand placement, technique and safety.  Provide stabilization to prevent fall   -Gait: Gait training and Standing activities to improve: base of support, weight shift, weight bearing    -Endurance: Utilize Supervised activities to increase level of endurance to allow for safe functional mobility including transfers and gait     PT long term treatment goals are located in below grid    Patient and or family understand(s) diagnosis, prognosis, and plan of care. Frequency of treatments: Patient will be seen  daily.          Prior Level of Function: Patient ambulated with wheeled walker with assist at nursing home  Rehab Potential: good  for baseline    Past medical history:   Past Medical History:   Diagnosis Date    Abnormal EKG     left bundle branch block    Acute exacerbation of chronic obstructive pulmonary disease (COPD) ([facility] 5/19/2022    Acute gout involving toe of right foot 09/03/2020    Acute on chronic combined systolic (congestive) and diastolic (congestive) heart failure (HCC)     Cancer (HCC)     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    Cerebrovascular disease     Clotting disorder ([facility] 1985    thrombophlebitis after c section delivery    Depression     15 years followed by dr Fay Babinski Hemodialysis patient [facility])     History of blood transfusion     Hyperlipidemia     Hypertension     Hypothyroidism     Leg swelling 09/03/2020    Lymphedema of both lower extremities 09/03/2020    Peripheral vascular disease ([facility]     Renal failure 11/2012    renal transplant    Thrombophlebitis     after c section delivery    Thyroid disease      Past Surgical History:   Procedure Laterality Date   [facility]    one normal delivery    COLECTOMY N/A 1/15/2022    DIAGNOSTIC  LAPAROSCOPY LYSIS OF ADHESIONS performed by Jesse Brown MD at [address] [place] [address] Drive CATH LAB PROCEDURE  2006    normal coronaries and 1996 normal coronaries    DIALYSIS FISTULA CREATION  march and july 2012    phase one and two patient will start dialysis when needed   [address]    transfemoral venous bypass grafts    IR IVC FILTER PLACEMENT W IMAGING  2/26/2022    IR IVC FILTER PLACEMENT W IMAGING 2/26/2022 [facility] SPECIAL PROCEDURES    KIDNEY TRANSPLANT  2016    KIDNEY TRANSPLANT  2015    KIDNEY TRANSPLANT  2015    PARATHYROIDECTOMY  2006    left parathyroid  implant and parathyroidectomy    TRANSESOPHAGEAL ECHOCARDIOGRAM N/A 2/28/2022    TRANSESOPHAGEAL ECHOCARDIOGRAM WITH BUBBLE STUDY performed by Tarsha Avila MD at [address]:    Precautions:, falls and contact isolation , -c diff, neutropenic precautions   Social history: Pt has had multiple hospitalizations and rehab stays over the last 6 months. Has been requiring assist with ADLs and ambulating with assist using wheeled walker at rehab. Equipment owned: Alee Ivy,      [address] [place] [address],[address] Mobility Inpatient   How much difficulty turning over in bed?: A Little  How much difficulty sitting down on / standing up from a chair with arms?: A Little  How much difficulty moving from lying on back to sitting on side of bed?: A Little  How much help from another person moving to and from a bed to a chair?: A Little  How much help from another person needed to walk in hospital room?: A Little  How much help from another person for climbing 3-5 steps with a railing?: A Lot  AM-PAC Inpatient Mobility Raw Score : 17  AM-PAC Inpatient T-Scale Score : 42.13  Mobility Inpatient CMS 0-100% Score: 50.57  Mobility Inpatient CMS G-Code Modifier : CK    Nursing cleared patient for PT treatment. . Pt has a fear of falling and states her knees have given out on her. OBJECTIVE;   Initial Evaluation  Date: 5/18/2022 Treatment Date:  5/23/2022       Short Term/ Long Term   Goals   Was pt agreeable to Eval/treatment?  Yes yes To be met in 4 days   Pain level   0/10  just fatigued per patient  0/10    Bed Mobility    Rolling: Minimal assist of 1    Supine to sit: Minimal assist of 1    Sit to supine: Minimal assist of 1    Scooting: Minimal assist of 1   Rolling: Not assessed patient in chair   Supine to sit: Not assessed patient in chair   Sit to supine: Not assessed patient in chair   Scooting: Not assessed patient in chair    Rolling: Independent    Supine to sit: Independent    Sit to supine: Independent    Scooting: Independent     Transfers Sit to stand: Minimal assist of 1 cues for hand and foot placement  Sit to stand: Minimal assist of 1 Cues for hand placement and safety       Sit to stand: Independent    Ambulation    5 feet using  wheeled walker with Moderate assist of 1   for walker control, balance, upright and safety 10 x 2 feet using  wheeled walker with Minimal assist of 1   cues for upright posture, increased base of support, increased step length, safety, pacing and bilateral knee extension in stance phase of gait    35 feet using  wheeled walker with Supervision     ROM Within functional limits    Increase range of motion 10% of affected joints    Strength BUE:  refer to OT eval  RLE:  4-/5  LLE:  4-/5  Increase strength in affected mm groups by 1/3 grade   Balance Sitting EOB:  good -  Dynamic Standing:  fair wheeled walker   Sitting EOB: good   Dynamic Standing: fair wheeled walker   Sitting EOB:  good   Dynamic Standing: good      Patient is Alert & Oriented x person, place, time and situation and follows directions    Sensation:  Patient  denies numbness/tingling   Edema:  no   Endurance: fair     Vitals: room air   Blood Pressure at rest  Blood Pressure during session    Heart Rate at rest  Heart Rate during session    SPO2 at rest %  SPO2 during session %     Patient education  Patient educated on role of Physical Therapy, risks of immobility, safety and plan of care,  importance of mobility while in hospital , ankle pumps, quad set and glut set for edema control, blood clot prevention, importance and purpose of adaptive device and adjusted to proper height for the patient.  and safety      Patient response to education:   Pt verbalized understanding Pt demonstrated skill Pt requires further education in this area   Yes Partial Yes      Treatment:  Patient practiced and was instructed/facilitated in the following treatment: Patient sitting in a chair at start of tx session. Pt stood and ambulated in the room with no knee-buckling during ambulation today. Pt needing minimal assist for stability to get back to chair safely due to wanting to sit abruptly. Pt performed seated exercises and standing static endurance training. Therapeutic Exercises:  ankle pumps, glut sets, hip abduction/adduction, long arc quad and seated marching,  x 15 reps. At end of session, patient in chair with . call light and phone within reach,  all lines and tubes intact, nursing notified. Patient would benefit from continued skilled Physical Therapy to improve functional independence and quality of life. Patient's/ family goals   rehab    Time in 14:23  Time out 14:52    Total Treatment Time  29 minutes         CPT codes:    Therapeutic activities (12333)   19 minutes  1 unit(s)  Therapeutic exercises (62345)   10 minutes  1 unit(s)   GABY Bell  [facility]  LIC # [id number]

## 2022-05-23 NOTE — PROGRESS NOTES
Pulmonary/Critical Care Progress Note    We are following patient for COPD exacerbation, status postrenal transplant, improving steroid myopathy, history of stroke, history of DVT with thromboembolic disease    SUBJECTIVE:  Patient continues to improve and has less muscle weakness but is still somewhat unsteady on her feet. Her breathing has continued to improve to the point where further reductions in prednisone are indicated. She continues on oral antibiotics which she is tolerating without difficulty. Her immunosuppressive medications are well-tolerated. MEDICATIONS:   predniSONE  20 mg Oral BID    sodium zirconium cyclosilicate  10 g Oral Daily    amoxicillin-clavulanate  1 tablet Oral 2 times per day    magnesium oxide  400 mg Oral Daily    ciprofloxacin  500 mg Oral Daily    ipratropium-albuterol  1 ampule Inhalation 4x Daily    acyclovir  400 mg Oral BID    allopurinol  100 mg Oral Daily    atorvastatin  10 mg Oral Nightly    budesonide  500 mcg Nebulization BID    cloZAPine  50 mg Oral Nightly    cycloSPORINE  100 mg Oral BID    desvenlafaxine succinate  50 mg Oral QPM    folic acid  1 mg Oral Daily    guaiFENesin  600 mg Oral BID    levothyroxine  50 mcg Oral Daily    metoprolol tartrate  12.5 mg Oral BID    miconazole   Topical BID    pantoprazole  40 mg Oral QAM AC    sodium bicarbonate  650 mg Oral Daily    vancomycin  125 mg Oral TID    Vitamin D  1,000 Units Oral Daily    apixaban  5 mg Oral BID    mycophenolate  1,000 mg Oral BID      sodium chloride 50 mL/hr at 05/23/22 0729     acetaminophen, diphenhydrAMINE, midodrine, simethicone      REVIEW OF SYSTEMS:  Constitutional: Denies fever, weight loss, night sweats, and fatigue  Skin: Denies pigmentation, dark lesions, and rashes   HEENT: Denies hearing loss, tinnitus, ear drainage, epistaxis, sore throat, and hoarseness. Cardiovascular: Denies palpitations, chest pain, and chest pressure.   Respiratory: Denies cough, dyspnea at rest, hemoptysis, apnea, and choking. Gastrointestinal: Denies nausea, vomiting, poor appetite, diarrhea, heartburn or reflux  Genitourinary: Denies dysuria, frequency, urgency or hematuria  Musculoskeletal: Denies myalgias, muscle weakness, and bone pain  Neurological: Denies dizziness, vertigo, headache, and focal weakness  Psychological: Denies anxiety and depression  Endocrine: Denies heat intolerance and cold intolerance  Hematopoietic/Lymphatic: Denies bleeding problems and blood transfusions    OBJECTIVE:  Vitals:    05/23/22 1025   BP:    Pulse:    Resp:    Temp:    SpO2: 93%        O2 Flow Rate (L/min): 0.5 L/min  O2 Device: None (Room air)    PHYSICAL EXAM:  Constitutional: Fever, chills, diaphoresis  Skin: No skin rash, no skin breakdown  HEENT: Unremarkable  Neck: No JVD, lymphadenopathy, thyromegaly  Cardiovascular: S1, S2 regular. No S3 murmurs rubs present  Respiratory: Clear to auscultation bilaterally. No significant wheezing is present  Gastrointestinal: Soft, obese, nontender  Genitourinary: No CVA tenderness  Extremities: No clubbing, cyanosis, or edema  Neurological: Awake, alert, oriented x3. No evidence of focal motor or sensory deficits  Psychological: In good spirits overall.   Appropriate affect    LABS:  WBC   Date Value Ref Range Status   05/23/2022 8.1 4.5 - 11.5 E9/L Final   05/22/2022 9.6 4.5 - 11.5 E9/L Final   05/21/2022 8.1 4.5 - 11.5 E9/L Final     Hemoglobin   Date Value Ref Range Status   05/23/2022 9.8 (L) 11.5 - 15.5 g/dL Final   05/22/2022 9.4 (L) 11.5 - 15.5 g/dL Final   05/21/2022 9.1 (L) 11.5 - 15.5 g/dL Final     Hematocrit   Date Value Ref Range Status   05/23/2022 32.1 (L) 34.0 - 48.0 % Final   05/22/2022 30.0 (L) 34.0 - 48.0 % Final   05/21/2022 30.7 (L) 34.0 - 48.0 % Final     MCV   Date Value Ref Range Status   05/23/2022 107.4 (H) 80.0 - 99.9 fL Final   05/22/2022 107.1 (H) 80.0 - 99.9 fL Final   05/21/2022 111.6 (H) 80.0 - 99.9 fL Final     Platelets Date Value Ref Range Status   05/23/2022 184 130 - 450 E9/L Final   05/22/2022 173 130 - 450 E9/L Corrected     Comment:     Platelet clumps are identified. This may decrease the automated platelet  count artifactually. 05/21/2022 178 130 - 450 E9/L Final     Sodium   Date Value Ref Range Status   05/23/2022 137 132 - 146 mmol/L Final   05/22/2022 137 132 - 146 mmol/L Final   05/21/2022 136 132 - 146 mmol/L Final     Potassium   Date Value Ref Range Status   05/23/2022 4.8 3.5 - 5.0 mmol/L Final   05/22/2022 5.3 (H) 3.5 - 5.0 mmol/L Final   05/21/2022 5.6 (H) 3.5 - 5.0 mmol/L Final     Potassium reflex Magnesium   Date Value Ref Range Status   04/21/2022 4.3 3.5 - 5.0 mmol/L Final   03/09/2022 4.7 3.5 - 5.0 mmol/L Final   02/26/2022 4.7 3.5 - 5.0 mmol/L Final     Comment:     Specimen is moderately Hemolyzed. Result may be artificially increased.      Chloride   Date Value Ref Range Status   05/23/2022 100 98 - 107 mmol/L Final   05/22/2022 98 98 - 107 mmol/L Final   05/21/2022 97 (L) 98 - 107 mmol/L Final     CO2   Date Value Ref Range Status   05/23/2022 27 22 - 29 mmol/L Final   05/22/2022 29 22 - 29 mmol/L Final   05/21/2022 26 22 - 29 mmol/L Final     BUN   Date Value Ref Range Status   05/23/2022 46 (H) 6 - 23 mg/dL Final   05/22/2022 44 (H) 6 - 23 mg/dL Final   05/21/2022 42 (H) 6 - 23 mg/dL Final     CREATININE   Date Value Ref Range Status   05/23/2022 1.9 (H) 0.5 - 1.0 mg/dL Final   05/22/2022 2.2 (H) 0.5 - 1.0 mg/dL Final   05/21/2022 2.1 (H) 0.5 - 1.0 mg/dL Final     Glucose   Date Value Ref Range Status   05/23/2022 159 (H) 74 - 99 mg/dL Final   05/22/2022 118 (H) 74 - 99 mg/dL Final   05/21/2022 197 (H) 74 - 99 mg/dL Final     Calcium   Date Value Ref Range Status   05/23/2022 9.9 8.6 - 10.2 mg/dL Final   05/22/2022 10.1 8.6 - 10.2 mg/dL Final   05/21/2022 10.3 (H) 8.6 - 10.2 mg/dL Final     Total Protein   Date Value Ref Range Status   05/23/2022 5.9 (L) 6.4 - 8.3 g/dL Final   05/22/2022 5.5 (L) 6.4 - 8.3 g/dL Final   05/21/2022 5.6 (L) 6.4 - 8.3 g/dL Final     Albumin   Date Value Ref Range Status   05/23/2022 3.1 (L) 3.5 - 5.2 g/dL Final   05/22/2022 3.3 (L) 3.5 - 5.2 g/dL Final   05/21/2022 2.9 (L) 3.5 - 5.2 g/dL Final     Total Bilirubin   Date Value Ref Range Status   05/23/2022 0.3 0.0 - 1.2 mg/dL Final   05/22/2022 0.4 0.0 - 1.2 mg/dL Final   05/21/2022 0.4 0.0 - 1.2 mg/dL Final     Alkaline Phosphatase   Date Value Ref Range Status   05/23/2022 68 35 - 104 U/L Final   05/22/2022 67 35 - 104 U/L Final   05/21/2022 68 35 - 104 U/L Final     AST   Date Value Ref Range Status   05/23/2022 38 (H) 0 - 31 U/L Final   05/22/2022 32 (H) 0 - 31 U/L Final     Comment:     Specimen is slightly Hemolyzed. Result may be artificially increased. 05/21/2022 23 0 - 31 U/L Final     ALT   Date Value Ref Range Status   05/23/2022 39 (H) 0 - 32 U/L Final   05/22/2022 28 0 - 32 U/L Final   05/21/2022 17 0 - 32 U/L Final     GFR Non-   Date Value Ref Range Status   05/23/2022 26 >=60 mL/min/1.73 Final     Comment:     Chronic Kidney Disease: less than 60 ml/min/1.73 sq.m. Kidney Failure: less than 15 ml/min/1.73 sq.m. Results valid for patients 18 years and older. 05/22/2022 22 >=60 mL/min/1.73 Final     Comment:     Chronic Kidney Disease: less than 60 ml/min/1.73 sq.m. Kidney Failure: less than 15 ml/min/1.73 sq.m. Results valid for patients 18 years and older. 05/21/2022 24 >=60 mL/min/1.73 Final     Comment:     Chronic Kidney Disease: less than 60 ml/min/1.73 sq.m. Kidney Failure: less than 15 ml/min/1.73 sq.m. Results valid for patients 18 years and older.        GFR    Date Value Ref Range Status   05/23/2022 32  Final   05/22/2022 27  Final   05/21/2022 28  Final     Magnesium   Date Value Ref Range Status   05/21/2022 2.2 1.6 - 2.6 mg/dL Final   05/17/2022 1.9 1.6 - 2.6 mg/dL Final   04/26/2022 2.1 1.6 - 2.6 mg/dL Final     Phosphorus   Date Value Ref Range Status   05/21/2022 3.7 2.5 - 4.5 mg/dL Final   03/16/2022 3.0 2.5 - 4.5 mg/dL Final   03/15/2022 3.4 2.5 - 4.5 mg/dL Final     No results for input(s): PH, PO2, PCO2, HCO3, BE, O2SAT in the last 72 hours. RADIOLOGY:  XR CHEST (2 VW)   Final Result   No acute process. Stable cardiomegaly. NM LUNG VENT/PERFUSION (VQ)   Final Result   Very low probability for pulmonary embolism. US DUP LOWER EXTREMITIES BILATERAL VENOUS   Final Result   No evidence of DVT in either lower extremity. XR CHEST PORTABLE   Final Result   1. Cardiomegaly. There are no findings of failure or pneumonia. PROBLEM LIST:  Principal Problem:    Hypoxia  Active Problems:    Acute exacerbation of chronic obstructive pulmonary disease (COPD) ([facility]  Resolved Problems:    * No resolved hospital problems. *      IMPRESSION:  1. COPD exacerbation, improving  2. History of renal transplant  3. Chronic kidney disease (BUN slightly up today, creatinine decreased)    PLAN:  1. Decrease prednisone  2. Await serum aldolase  3.  PT and OT        Electronically signed by Cricket Dyer MD on 5/23/2022 at 5:57 PM

## 2022-05-24 VITALS
WEIGHT: 200 LBS | SYSTOLIC BLOOD PRESSURE: 169 MMHG | TEMPERATURE: 97.9 F | HEIGHT: 62 IN | DIASTOLIC BLOOD PRESSURE: 90 MMHG | BODY MASS INDEX: 36.8 KG/M2 | HEART RATE: 78 BPM | OXYGEN SATURATION: 96 % | RESPIRATION RATE: 16 BRPM

## 2022-05-24 LAB
ALBUMIN SERPL-MCNC: 3 G/DL (ref 3.5–5.2)
ALDOLASE: 9.1 U/L (ref 1.2–7.6)
ALP BLD-CCNC: 61 U/L (ref 35–104)
ALT SERPL-CCNC: 29 U/L (ref 0–32)
ANION GAP SERPL CALCULATED.3IONS-SCNC: 12 MMOL/L (ref 7–16)
ANISOCYTOSIS: ABNORMAL
AST SERPL-CCNC: 19 U/L (ref 0–31)
BASOPHILS ABSOLUTE: 0.01 E9/L (ref 0–0.2)
BASOPHILS RELATIVE PERCENT: 0.1 % (ref 0–2)
BILIRUB SERPL-MCNC: 0.3 MG/DL (ref 0–1.2)
BUN BLDV-MCNC: 50 MG/DL (ref 6–23)
CALCIUM SERPL-MCNC: 9.6 MG/DL (ref 8.6–10.2)
CHLORIDE BLD-SCNC: 98 MMOL/L (ref 98–107)
CO2: 25 MMOL/L (ref 22–29)
CREAT SERPL-MCNC: 1.9 MG/DL (ref 0.5–1)
EOSINOPHILS ABSOLUTE: 0 E9/L (ref 0.05–0.5)
EOSINOPHILS RELATIVE PERCENT: 0 % (ref 0–6)
GFR AFRICAN AMERICAN: 32
GFR NON-AFRICAN AMERICAN: 26 ML/MIN/1.73
GLUCOSE BLD-MCNC: 139 MG/DL (ref 74–99)
HCT VFR BLD CALC: 29.9 % (ref 34–48)
HEMOGLOBIN: 9.4 G/DL (ref 11.5–15.5)
IMMATURE GRANULOCYTES #: 0.22 E9/L
IMMATURE GRANULOCYTES %: 2.6 % (ref 0–5)
LYMPHOCYTES ABSOLUTE: 0.49 E9/L (ref 1.5–4)
LYMPHOCYTES RELATIVE PERCENT: 5.8 % (ref 20–42)
MCH RBC QN AUTO: 33.7 PG (ref 26–35)
MCHC RBC AUTO-ENTMCNC: 31.4 % (ref 32–34.5)
MCV RBC AUTO: 107.2 FL (ref 80–99.9)
MONOCYTES ABSOLUTE: 0.4 E9/L (ref 0.1–0.95)
MONOCYTES RELATIVE PERCENT: 4.8 % (ref 2–12)
NEUTROPHILS ABSOLUTE: 7.27 E9/L (ref 1.8–7.3)
NEUTROPHILS RELATIVE PERCENT: 86.7 % (ref 43–80)
PDW BLD-RTO: 16.8 FL (ref 11.5–15)
PLATELET # BLD: 185 E9/L (ref 130–450)
PMV BLD AUTO: 12.5 FL (ref 7–12)
POLYCHROMASIA: ABNORMAL
POTASSIUM SERPL-SCNC: 4.4 MMOL/L (ref 3.5–5)
RBC # BLD: 2.79 E12/L (ref 3.5–5.5)
SODIUM BLD-SCNC: 135 MMOL/L (ref 132–146)
TOTAL PROTEIN: 5.5 G/DL (ref 6.4–8.3)
WBC # BLD: 8.4 E9/L (ref 4.5–11.5)

## 2022-05-24 PROCEDURE — 80053 COMPREHEN METABOLIC PANEL: CPT

## 2022-05-24 PROCEDURE — 6370000000 HC RX 637 (ALT 250 FOR IP): Performed by: INTERNAL MEDICINE

## 2022-05-24 PROCEDURE — 2580000003 HC RX 258: Performed by: INTERNAL MEDICINE

## 2022-05-24 PROCEDURE — 97530 THERAPEUTIC ACTIVITIES: CPT

## 2022-05-24 PROCEDURE — 85025 COMPLETE CBC W/AUTO DIFF WBC: CPT

## 2022-05-24 PROCEDURE — 6360000002 HC RX W HCPCS: Performed by: INTERNAL MEDICINE

## 2022-05-24 PROCEDURE — 97110 THERAPEUTIC EXERCISES: CPT

## 2022-05-24 PROCEDURE — 94640 AIRWAY INHALATION TREATMENT: CPT

## 2022-05-24 PROCEDURE — 36415 COLL VENOUS BLD VENIPUNCTURE: CPT

## 2022-05-24 RX ORDER — AMOXICILLIN AND CLAVULANATE POTASSIUM 875; 125 MG/1; MG/1
1 TABLET, FILM COATED ORAL 2 TIMES DAILY
Qty: 8 TABLET | Refills: 0 | Status: SHIPPED | OUTPATIENT
Start: 2022-05-24 | End: 2022-06-07

## 2022-05-24 RX ORDER — DOCUSATE SODIUM 100 MG/1
100 CAPSULE, LIQUID FILLED ORAL 2 TIMES DAILY PRN
Status: DISCONTINUED | OUTPATIENT
Start: 2022-05-24 | End: 2022-05-24 | Stop reason: HOSPADM

## 2022-05-24 RX ORDER — CIPROFLOXACIN 500 MG/1
500 TABLET, FILM COATED ORAL 2 TIMES DAILY
Qty: 16 TABLET | Refills: 0 | Status: SHIPPED | OUTPATIENT
Start: 2022-05-24 | End: 2022-06-01

## 2022-05-24 RX ADMIN — VANCOMYCIN HYDROCHLORIDE 125 MG: 10 INJECTION, POWDER, LYOPHILIZED, FOR SOLUTION INTRAVENOUS at 14:11

## 2022-05-24 RX ADMIN — CIPROFLOXACIN HYDROCHLORIDE 500 MG: 500 TABLET, FILM COATED ORAL at 10:05

## 2022-05-24 RX ADMIN — AMOXICILLIN AND CLAVULANATE POTASSIUM 1 TABLET: 500; 125 TABLET, FILM COATED ORAL at 10:06

## 2022-05-24 RX ADMIN — DOCUSATE SODIUM 100 MG: 100 CAPSULE, LIQUID FILLED ORAL at 14:12

## 2022-05-24 RX ADMIN — IPRATROPIUM BROMIDE AND ALBUTEROL SULFATE 1 AMPULE: .5; 2.5 SOLUTION RESPIRATORY (INHALATION) at 06:52

## 2022-05-24 RX ADMIN — ACYCLOVIR 400 MG: 400 TABLET ORAL at 10:05

## 2022-05-24 RX ADMIN — SODIUM CHLORIDE: 9 INJECTION, SOLUTION INTRAVENOUS at 05:56

## 2022-05-24 RX ADMIN — Medication 1000 UNITS: at 10:05

## 2022-05-24 RX ADMIN — PREDNISONE 10 MG: 10 TABLET ORAL at 10:05

## 2022-05-24 RX ADMIN — IPRATROPIUM BROMIDE AND ALBUTEROL SULFATE 1 AMPULE: .5; 2.5 SOLUTION RESPIRATORY (INHALATION) at 13:25

## 2022-05-24 RX ADMIN — ANTI-FUNGAL POWDER MICONAZOLE NITRATE TALC FREE: 1.42 POWDER TOPICAL at 10:04

## 2022-05-24 RX ADMIN — BUDESONIDE 500 MCG: 0.5 INHALANT RESPIRATORY (INHALATION) at 06:52

## 2022-05-24 RX ADMIN — ALLOPURINOL 100 MG: 100 TABLET ORAL at 10:05

## 2022-05-24 RX ADMIN — METOPROLOL TARTRATE 12.5 MG: 25 TABLET, FILM COATED ORAL at 10:04

## 2022-05-24 RX ADMIN — APIXABAN 5 MG: 5 TABLET, FILM COATED ORAL at 10:05

## 2022-05-24 RX ADMIN — PANTOPRAZOLE SODIUM 40 MG: 40 TABLET, DELAYED RELEASE ORAL at 05:52

## 2022-05-24 RX ADMIN — IPRATROPIUM BROMIDE AND ALBUTEROL SULFATE 1 AMPULE: .5; 2.5 SOLUTION RESPIRATORY (INHALATION) at 10:06

## 2022-05-24 RX ADMIN — Medication 400 MG: at 10:05

## 2022-05-24 RX ADMIN — MYCOPHENOLATE MOFETIL 1000 MG: 250 CAPSULE ORAL at 10:06

## 2022-05-24 RX ADMIN — FOLIC ACID 1 MG: 1 TABLET ORAL at 10:05

## 2022-05-24 RX ADMIN — SODIUM BICARBONATE 650 MG: 650 TABLET ORAL at 10:05

## 2022-05-24 RX ADMIN — LEVOTHYROXINE SODIUM 50 MCG: 0.05 TABLET ORAL at 10:04

## 2022-05-24 RX ADMIN — GUAIFENESIN 600 MG: 600 TABLET, EXTENDED RELEASE ORAL at 10:05

## 2022-05-24 RX ADMIN — CYCLOSPORINE 100 MG: 25 CAPSULE, GELATIN COATED ORAL at 10:07

## 2022-05-24 ASSESSMENT — PAIN SCALES - GENERAL: PAINLEVEL_OUTOF10: 0

## 2022-05-24 NOTE — PLAN OF CARE
Problem: Skin/Tissue Integrity  Goal: Absence of new skin breakdown  Description: 1. Monitor for areas of redness and/or skin breakdown  2. Assess vascular access sites hourly  3. Every 4-6 hours minimum:  Change oxygen saturation probe site  4. Every 4-6 hours:  If on nasal continuous positive airway pressure, respiratory therapy assess nares and determine need for appliance change or resting period.   5/24/2022 0450 by Kenna Roberts RN  Outcome: Progressing  5/23/2022 1934 by Marilee Cheng RN  Outcome: Progressing     Problem: ABCDS Injury Assessment  Goal: Absence of physical injury  5/24/2022 0450 by Kenna Roberts RN  Outcome: Progressing  5/23/2022 1934 by Marilee Cheng RN  Outcome: Progressing     Problem: Pain  Goal: Verbalizes/displays adequate comfort level or baseline comfort level  5/23/2022 1934 by Marilee Cheng RN  Outcome: Progressing     Problem: Chronic Conditions and Co-morbidities  Goal: Patient's chronic conditions and co-morbidity symptoms are monitored and maintained or improved  Outcome: Progressing     Problem: Safety - Adult  Goal: Free from fall injury  5/24/2022 0450 by Kenna Roberts RN  Outcome: Progressing  5/23/2022 1934 by Marilee Cheng RN  Outcome: Progressing

## 2022-05-24 NOTE — PROGRESS NOTES
Physical Therapy Treatment Note/Plan of Care    Room #:  2407/3218-90  Patient Name: Saurabh Acevedo  YOB: 1955  MRN: [id number]    Date of Service: 5/24/2022     Tentative placement recommendation: Subacute rehab  Equipment recommendation:  To be determined      Evaluating Physical Therapist: Willian Coffman, [address] [facility] #[id number]      Specific Provider Orders/Date/Referring Provider :   05/18/22 0900   PT eval and treat Start: 05/18/22 0900, End: 05/18/22 0900, ONE TIME, Standing Count: 1 Occurrences, Graham Bravo MD      Admitting Diagnosis:   Hypoxia [R09.02]  Chronic renal insufficiency, stage 3 (moderate) ([address] [N18.30]  Dyspnea, unspecified type [R06.00]  Acute exacerbation of chronic obstructive pulmonary disease (COPD) ([address] [J44.1]      Surgery: none  Visit Diagnoses       Codes    Dyspnea, unspecified type    -  Primary R06.00    Chronic renal insufficiency, stage 3 (moderate) (HCC)     N18.30          Patient Active Problem List   Diagnosis    Peripheral vascular disease ([address]    Depression    Clotting disorder ([address]    Abnormal EKG    Cancer ([address]    Over weight    Kidney transplanted    ESRD (end stage renal disease) ([address]    Non morbid obesity due to excess calories    Hypertension    Leg swelling    Lymphedema of both lower extremities    Acute gout involving toe of right foot    Sepsis secondary to UTI ([address]    Acute kidney injury superimposed on CKD ([address]    Thyroid disease    Acute on chronic combined systolic (congestive) and diastolic (congestive) heart failure ([address]    Sepsis ([address]    Acute renal failure ([address]    CECILY (acute kidney injury) ([address]    Ischemic bowel disease ([address]    Altered mental status    Coma ([address]    Acute respiratory failure with hypoxia ([address]    Hypoxia    Acute metabolic encephalopathy    Acute deep vein thrombosis (DVT) of femoral vein of left lower extremity (HCC)    Anemia    Positive blood culture    Difficult intravenous access    Paroxysmal atrial fibrillation (HCC)    Hypotension    LBBB (left bundle branch block)    History of DVT (deep vein thrombosis)    Chronic anticoagulation    Acute respiratory failure with hypoxemia ([facility]    Acute exacerbation of chronic obstructive pulmonary disease (COPD) ([facility])        ASSESSMENT of Current Deficits Patient exhibits decreased strength, balance and endurance impairing functional mobility, transfers and gait . Patient's fear of falling and knee buckling in previous tx sessions limiting her progress with therapy and her gait distance. Pt performed seated exercises before ambulation. Pt ambulated in the room 22 feet before needing a seated break and ambulated 22 feet a second time. Pt was limited due to impaired strength and endurance with B LE's. The patient will benefit from continued skilled therapy to increase strength and improve balance for safe functional mobility, to decrease risk of falls, and to meet goals at discharge. PHYSICAL THERAPY  PLAN OF CARE       Physical therapy plan of care is established based on physician order,  patient diagnosis and clinical assessment    Current Treatment Recommendations:    -Bed Mobility: Lower extremity exercises   -Sitting Balance: Incorporate reaching activities to activate trunk muscles   -Standing Balance: Perform strengthening exercises in standing to promote motor control with or without upper extremity support   -Transfers: Provide instruction on proper hand and foot position for adequate transfer of weight onto lower extremities and use of gait device if needed and Cues for hand placement, technique and safety.  Provide stabilization to prevent fall   -Gait: Gait training and Standing activities to improve: base of support, weight shift, weight bearing    -Endurance: Utilize Supervised activities to increase level of endurance to allow for safe functional mobility including transfers and gait     PT long term treatment goals are located in below grid    Patient and or family understand(s) diagnosis, prognosis, and plan of care. Frequency of treatments: Patient will be seen  daily.          Prior Level of Function: Patient ambulated with wheeled walker with assist at nursing home  Rehab Potential: good  for baseline    Past medical history:   Past Medical History:   Diagnosis Date    Abnormal EKG     left bundle branch block    Acute exacerbation of chronic obstructive pulmonary disease (COPD) ([facility] 5/19/2022    Acute gout involving toe of right foot 09/03/2020    Acute on chronic combined systolic (congestive) and diastolic (congestive) heart failure (HCC)     Cancer (HCC)     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    Cerebrovascular disease     Clotting disorder ([facility] 1985    thrombophlebitis after c section delivery    Depression     15 years followed by dr Dmitriy Arenas Hemodialysis patient [facility])     History of blood transfusion     Hyperlipidemia     Hypertension     Hypothyroidism     Leg swelling 09/03/2020    Lymphedema of both lower extremities 09/03/2020    Peripheral vascular disease ([facility]     Renal failure 11/2012    renal transplant    Thrombophlebitis     after c section delivery    Thyroid disease      Past Surgical History:   Procedure Laterality Date   [address]    one normal delivery    COLECTOMY N/A 1/15/2022    DIAGNOSTIC  LAPAROSCOPY LYSIS OF ADHESIONS performed by Lucia Gaines MD at [address] [place] [address] Drive CATH LAB PROCEDURE  2006    normal coronaries and 1996 normal coronaries    DIALYSIS FISTULA CREATION  march and july 2012    phase one and two patient will start dialysis when needed   [address]    transfemoral venous bypass grafts    IR IVC FILTER PLACEMENT W IMAGING  2/26/2022    IR IVC FILTER PLACEMENT W IMAGING 2/26/2022 [facility] SPECIAL PROCEDURES    KIDNEY TRANSPLANT  2016    KIDNEY TRANSPLANT  2015    KIDNEY TRANSPLANT 2015    PARATHYROIDECTOMY  2006    left parathyroid  implant and parathyroidectomy    TRANSESOPHAGEAL ECHOCARDIOGRAM N/A 2/28/2022    TRANSESOPHAGEAL ECHOCARDIOGRAM WITH BUBBLE STUDY performed by Bruna Lopez MD at [facility]:    Precautions:, falls and contact isolation , -c diff, neutropenic precautions   Social history: Pt has had multiple hospitalizations and rehab stays over the last 6 months. Has been requiring assist with ADLs and ambulating with assist using wheeled walker at rehab. Equipment owned: Roslyn Ruben,      [address] [place] [address],[address] Mobility Inpatient   How much difficulty turning over in bed?: A Little  How much difficulty sitting down on / standing up from a chair with arms?: A Little  How much difficulty moving from lying on back to sitting on side of bed?: A Little  How much help from another person moving to and from a bed to a chair?: A Little  How much help from another person needed to walk in hospital room?: A Little  How much help from another person for climbing 3-5 steps with a railing?: A Lot  AM-PAC Inpatient Mobility Raw Score : 17  AM-PAC Inpatient T-Scale Score : 42.13  Mobility Inpatient CMS 0-100% Score: 50.57  Mobility Inpatient CMS G-Code Modifier : CK    Nursing cleared patient for PT treatment. . Pt has a fear of falling and states her knees have given out on her. OBJECTIVE;   Initial Evaluation  Date: 5/18/2022 Treatment Date:  5/24/2022       Short Term/ Long Term   Goals   Was pt agreeable to Eval/treatment? Yes yes To be met in 4 days   Pain level   0/10  just fatigued per patient  0/10    Bed Mobility    Rolling: Minimal assist of 1    Supine to sit: Minimal assist of 1    Sit to supine: Minimal assist of 1    Scooting: Minimal assist of 1   Rolling: Supervision    Supine to sit: Supervision    Sit to supine: Not assessed patient in chair   Scooting: Supervision     Rolling: Independent    Supine to sit:  Independent    Sit to supine: Independent    Scooting: Independent     Transfers Sit to stand: Minimal assist of 1 cues for hand and foot placement  Sit to stand: Minimal assist of 1 Cues for hand placement and safety       Sit to stand: Independent    Ambulation    5 feet using  wheeled walker with Moderate assist of 1   for walker control, balance, upright and safety 22 x 2 feet using  wheeled walker with Minimal assist of 1   cues for upright posture, increased base of support, increased step length, safety and pacing    35 feet using  wheeled walker with Supervision     ROM Within functional limits    Increase range of motion 10% of affected joints    Strength BUE:  refer to OT eval  RLE:  4-/5  LLE:  4-/5  Increase strength in affected mm groups by 1/3 grade   Balance Sitting EOB:  good -  Dynamic Standing:  fair wheeled walker   Sitting EOB: good   Dynamic Standing: fair wheeled walker   Sitting EOB:  good   Dynamic Standing: good      Patient is Alert & Oriented x person, place, time and situation and follows directions    Sensation:  Patient  denies numbness/tingling   Edema:  no   Endurance: fair     Vitals: room air   Blood Pressure at rest  Blood Pressure during session    Heart Rate at rest  Heart Rate during session    SPO2 at rest %  SPO2 during session %     Patient education  Patient educated on role of Physical Therapy, risks of immobility, safety and plan of care,  importance of mobility while in hospital , ankle pumps, quad set and glut set for edema control, blood clot prevention, importance and purpose of adaptive device and adjusted to proper height for the patient. and safety      Patient response to education:   Pt verbalized understanding Pt demonstrated skill Pt requires further education in this area   Yes Partial Yes      Treatment:  Patient practiced and was instructed/facilitated in the following treatment: Pt was in bed prior to tx. and performed bed mobility with supervision.  Patient Sat edge of bed 10 minutes with Supervision  to increase dynamic sitting balance and activity tolerance. Pt stood and ambulated in the room with no knee-buckling during ambulation today. Pt needing minimal assist for stability to get back to chair safely due to wanting to sit abruptly. Pt performed seated exercises. Therapeutic Exercises:  ankle pumps, glut sets, hip abduction/adduction, long arc quad and seated marching,  x 15 reps. At end of session, patient in chair with . call light and phone within reach,  all lines and tubes intact, nursing notified. Patient would benefit from continued skilled Physical Therapy to improve functional independence and quality of life. Patient's/ family goals   rehab    Time in 10:12  Time out 10:50    Total Treatment Time  38 minutes         CPT codes:    Therapeutic activities (64244)   28 minutes  2 unit(s)  Therapeutic exercises (11227)   10 minutes  1 unit(s)   Negrito Curtis, GABY Parra  [facility]  LIC # [id number]

## 2022-05-24 NOTE — PROGRESS NOTES
PULMONARY/ CRITICAL CARE MEDICINE FOLLOW UP    78year old person with PM[facility] as [facility] below [facility], status postrenal transplant, improving steroid myopathy, history of stroke, history of DVT with thromboembolic disease. The patient is on room air, no dyspnea at all      BP (!) 169/90   Pulse 78   Temp 97.9 °F (36.6 °C) (Oral)   Resp 16   Ht 5' 2\" (1.575 m)   Wt 200 lb (90.7 kg)   SpO2 96%   BMI 36.58 kg/m²   General: Awake, oriented to place, time and person  HEENT: No head lesions, PERRL, EOMI, mouth without lesions, no nasal lesions, no cervical adenopathy palpated  Respiratory: Lungs with equal breath sounds bilaterally, no adventitious sounds auscultated, no accessory muscle use  CV: Regular rate, no murmurs, no JVD, no leg edema  Abdomen: Soft, non tender, + bowel sounds, no lesions  Skin: Hydrated, adequate turgor, no rash, capillary refill <2 seconds  Extremities: Muscular strength 4/5in 4 limbs, moves 4 limbs spontaneously, distal pulses present  Neurology: Awake and alert, follows commands, moves 4 limbs on command and spontaneously,  neck is supple, no meningitic signs present. A/P:  IMPRESSION:  1. COPD exacerbation, improving and now on room air  2. History of renal transplant  3. Chronic kidney disease (BUN slightly up today, creatinine decreased)     PLAN:  1. Continue tapering prednisone to her usual 5 mg/day   2.  PT and OT    --The patient will need exercise oximetry prior to discharge  --Bronchodilators: LABA/ICS + LAMA (Symbicort/advair + Spiriva or Trelegy Ellipta)  --Please refer to outpatient pulmonology as she will need follow up and pulmonary rehabilitation     MEDICATIONS:   predniSONE  10 mg Oral BID    vancomycin  125 mg Oral 2 times per day    amoxicillin-clavulanate  1 tablet Oral 2 times per day    magnesium oxide  400 mg Oral Daily    ciprofloxacin  500 mg Oral Daily    ipratropium-albuterol  1 ampule Inhalation 4x Daily    acyclovir  400 mg Oral BID    allopurinol  100 mg Oral Daily    atorvastatin  10 mg Oral Nightly    budesonide  500 mcg Nebulization BID    cloZAPine  50 mg Oral Nightly    cycloSPORINE  100 mg Oral BID    desvenlafaxine succinate  50 mg Oral QPM    folic acid  1 mg Oral Daily    guaiFENesin  600 mg Oral BID    levothyroxine  50 mcg Oral Daily    metoprolol tartrate  12.5 mg Oral BID    miconazole   Topical BID    pantoprazole  40 mg Oral QAM AC    sodium bicarbonate  650 mg Oral Daily    Vitamin D  1,000 Units Oral Daily    apixaban  5 mg Oral BID    mycophenolate  1,000 mg Oral BID      sodium chloride 50 mL/hr at 05/24/22 0557     docusate sodium, acetaminophen, diphenhydrAMINE, simethicone    OBJECTIVE:  Vitals:    05/24/22 0800   BP: (!) 169/90   Pulse: 78   Resp: 16   Temp: 97.9 °F (36.6 °C)   SpO2: 96%        O2 Flow Rate (L/min): 0.5 L/min  O2 Device: None (Room air)        LABS:  WBC   Date Value Ref Range Status   05/24/2022 8.4 4.5 - 11.5 E9/L Final   05/23/2022 8.1 4.5 - 11.5 E9/L Final   05/22/2022 9.6 4.5 - 11.5 E9/L Final     Hemoglobin   Date Value Ref Range Status   05/24/2022 9.4 (L) 11.5 - 15.5 g/dL Final   05/23/2022 9.8 (L) 11.5 - 15.5 g/dL Final   05/22/2022 9.4 (L) 11.5 - 15.5 g/dL Final     Hematocrit   Date Value Ref Range Status   05/24/2022 29.9 (L) 34.0 - 48.0 % Final   05/23/2022 32.1 (L) 34.0 - 48.0 % Final   05/22/2022 30.0 (L) 34.0 - 48.0 % Final     MCV   Date Value Ref Range Status   05/24/2022 107.2 (H) 80.0 - 99.9 fL Final   05/23/2022 107.4 (H) 80.0 - 99.9 fL Final   05/22/2022 107.1 (H) 80.0 - 99.9 fL Final     Platelets   Date Value Ref Range Status   05/24/2022 185 130 - 450 E9/L Final   05/23/2022 184 130 - 450 E9/L Final   05/22/2022 173 130 - 450 E9/L Corrected     Comment:     Platelet clumps are identified. This may decrease the automated platelet  count artifactually.        Sodium   Date Value Ref Range Status   05/24/2022 135 132 - 146 mmol/L Final   05/23/2022 137 132 - 146 mmol/L Final   05/22/2022 137 132 - 146 mmol/L Final     Potassium   Date Value Ref Range Status   05/24/2022 4.4 3.5 - 5.0 mmol/L Final   05/23/2022 4.8 3.5 - 5.0 mmol/L Final   05/22/2022 5.3 (H) 3.5 - 5.0 mmol/L Final     Potassium reflex Magnesium   Date Value Ref Range Status   04/21/2022 4.3 3.5 - 5.0 mmol/L Final   03/09/2022 4.7 3.5 - 5.0 mmol/L Final   02/26/2022 4.7 3.5 - 5.0 mmol/L Final     Comment:     Specimen is moderately Hemolyzed. Result may be artificially increased.      Chloride   Date Value Ref Range Status   05/24/2022 98 98 - 107 mmol/L Final   05/23/2022 100 98 - 107 mmol/L Final   05/22/2022 98 98 - 107 mmol/L Final     CO2   Date Value Ref Range Status   05/24/2022 25 22 - 29 mmol/L Final   05/23/2022 27 22 - 29 mmol/L Final   05/22/2022 29 22 - 29 mmol/L Final     BUN   Date Value Ref Range Status   05/24/2022 50 (H) 6 - 23 mg/dL Final   05/23/2022 46 (H) 6 - 23 mg/dL Final   05/22/2022 44 (H) 6 - 23 mg/dL Final     CREATININE   Date Value Ref Range Status   05/24/2022 1.9 (H) 0.5 - 1.0 mg/dL Final   05/23/2022 1.9 (H) 0.5 - 1.0 mg/dL Final   05/22/2022 2.2 (H) 0.5 - 1.0 mg/dL Final     Glucose   Date Value Ref Range Status   05/24/2022 139 (H) 74 - 99 mg/dL Final   05/23/2022 159 (H) 74 - 99 mg/dL Final   05/22/2022 118 (H) 74 - 99 mg/dL Final     Calcium   Date Value Ref Range Status   05/24/2022 9.6 8.6 - 10.2 mg/dL Final   05/23/2022 9.9 8.6 - 10.2 mg/dL Final   05/22/2022 10.1 8.6 - 10.2 mg/dL Final     Total Protein   Date Value Ref Range Status   05/24/2022 5.5 (L) 6.4 - 8.3 g/dL Final   05/23/2022 5.9 (L) 6.4 - 8.3 g/dL Final   05/22/2022 5.5 (L) 6.4 - 8.3 g/dL Final     Albumin   Date Value Ref Range Status   05/24/2022 3.0 (L) 3.5 - 5.2 g/dL Final   05/23/2022 3.1 (L) 3.5 - 5.2 g/dL Final   05/22/2022 3.3 (L) 3.5 - 5.2 g/dL Final     Total Bilirubin   Date Value Ref Range Status   05/24/2022 0.3 0.0 - 1.2 mg/dL Final   05/23/2022 0.3 0.0 - 1.2 mg/dL Final   05/22/2022 0.4 0.0 - 1.2 mg/dL Final     Alkaline Phosphatase   Date Value Ref Range Status   05/24/2022 61 35 - 104 U/L Final   05/23/2022 68 35 - 104 U/L Final   05/22/2022 67 35 - 104 U/L Final     AST   Date Value Ref Range Status   05/24/2022 19 0 - 31 U/L Final   05/23/2022 38 (H) 0 - 31 U/L Final   05/22/2022 32 (H) 0 - 31 U/L Final     Comment:     Specimen is slightly Hemolyzed. Result may be artificially increased. ALT   Date Value Ref Range Status   05/24/2022 29 0 - 32 U/L Final   05/23/2022 39 (H) 0 - 32 U/L Final   05/22/2022 28 0 - 32 U/L Final     GFR Non-   Date Value Ref Range Status   05/24/2022 26 >=60 mL/min/1.73 Final     Comment:     Chronic Kidney Disease: less than 60 ml/min/1.73 sq.m. Kidney Failure: less than 15 ml/min/1.73 sq.m. Results valid for patients 18 years and older. 05/23/2022 26 >=60 mL/min/1.73 Final     Comment:     Chronic Kidney Disease: less than 60 ml/min/1.73 sq.m. Kidney Failure: less than 15 ml/min/1.73 sq.m. Results valid for patients 18 years and older. 05/22/2022 22 >=60 mL/min/1.73 Final     Comment:     Chronic Kidney Disease: less than 60 ml/min/1.73 sq.m. Kidney Failure: less than 15 ml/min/1.73 sq.m. Results valid for patients 18 years and older. GFR    Date Value Ref Range Status   05/24/2022 32  Final   05/23/2022 32  Final   05/22/2022 27  Final     Magnesium   Date Value Ref Range Status   05/21/2022 2.2 1.6 - 2.6 mg/dL Final   05/17/2022 1.9 1.6 - 2.6 mg/dL Final   04/26/2022 2.1 1.6 - 2.6 mg/dL Final     Phosphorus   Date Value Ref Range Status   05/21/2022 3.7 2.5 - 4.5 mg/dL Final   03/16/2022 3.0 2.5 - 4.5 mg/dL Final   03/15/2022 3.4 2.5 - 4.5 mg/dL Final     No results for input(s): PH, PO2, PCO2, HCO3, BE, O2SAT in the last 72 hours. RADIOLOGY:  XR CHEST (2 VW)   Final Result   No acute process. Stable cardiomegaly.          NM LUNG VENT/PERFUSION (VQ)   Final Result   Very low probability for pulmonary embolism. US DUP LOWER EXTREMITIES BILATERAL VENOUS   Final Result   No evidence of DVT in either lower extremity. XR CHEST PORTABLE   Final Result   1. Cardiomegaly. There are no findings of failure or pneumonia. PROBLEM LIST:  Principal Problem:    Hypoxia  Active Problems:    Acute exacerbation of chronic obstructive pulmonary disease (COPD) ([facility]  Resolved Problems:    * No resolved hospital problems.  *    Rashaad Contreras MD  Pulmonary and Critical Care Medicine

## 2022-05-24 NOTE — PROGRESS NOTES
[facility] MD Dick Nair, MD Lesly Antonio, MD Elan Briseno, MD Joleen Patterson, RAHAT Aviles, ZAIRE  Progress Note    5/24/2022    SUBJECTIVE:   5/22:(-) c/o's  (-) sob/platt/cp/palp Appetite ok  Doing remarkably better today with no complaints of shortness of breath or chest discomfort. 5/23; overall doing well no new complaints continue same therapy, patient was anxious to touch base with [facility] to notify them that she missing her appointment with the transplant office. 5/24: Patient was seen and evaluated in her room, no new complaints for the examination within normal limits patient be discharged today will follow with Dr. Spencer Jones as an outpatient and is in 1 month. We will repeat BMP and CBC  PROBLEM LIST:    Principal Problem:    Hypoxia  Active Problems:    Acute exacerbation of chronic obstructive pulmonary disease (COPD) ([facility] [address]  Resolved Problems:    * No resolved hospital problems. *         DIET:    ADULT DIET; Regular;  Low Sodium (2 gm)     MEDS (scheduled):    predniSONE  10 mg Oral BID    vancomycin  125 mg Oral 2 times per day    amoxicillin-clavulanate  1 tablet Oral 2 times per day    magnesium oxide  400 mg Oral Daily    ciprofloxacin  500 mg Oral Daily    ipratropium-albuterol  1 ampule Inhalation 4x Daily    acyclovir  400 mg Oral BID    allopurinol  100 mg Oral Daily    atorvastatin  10 mg Oral Nightly    budesonide  500 mcg Nebulization BID    cloZAPine  50 mg Oral Nightly    cycloSPORINE  100 mg Oral BID    desvenlafaxine succinate  50 mg Oral QPM    folic acid  1 mg Oral Daily    guaiFENesin  600 mg Oral BID    levothyroxine  50 mcg Oral Daily    metoprolol tartrate  12.5 mg Oral BID    miconazole   Topical BID    pantoprazole  40 mg Oral QAM AC    sodium bicarbonate  650 mg Oral Daily    Vitamin D  1,000 Units Oral Daily    apixaban  5 mg Oral BID    mycophenolate  1,000 mg Oral BID MEDS (infusions):   sodium chloride 50 mL/hr at 05/24/22 0557       MEDS (prn):  docusate sodium, acetaminophen, diphenhydrAMINE, simethicone    PHYSICAL EXAM:     Patient Vitals for the past 24 hrs:   BP Temp Temp src Pulse Resp SpO2   05/24/22 0800 (!) 169/90 97.9 °F (36.6 °C) Oral 78 16 96 %   05/24/22 0018 -- -- -- 80 -- --   05/23/22 1920 (!) 155/108 98 °F (36.7 °C) Infrared 88 18 98 %   @      Intake/Output Summary (Last 24 hours) at 5/24/2022 1217  Last data filed at 5/24/2022 0557  Gross per 24 hour   Intake 820.77 ml   Output --   Net 820.77 ml         Wt Readings from Last 3 Encounters:   05/17/22 200 lb (90.7 kg)   04/26/22 172 lb 3.2 oz (78.1 kg)   03/15/22 226 lb (102.5 kg)       Constitutional:  in no acute distress  HEENT: NC/AT, EOMI, sclera and conjunctiva are clear and anicteric, mucus membranes moist  Neck: Trachea midline, no JVD  Cardiovascular: S1, S2 regular rhythm, no murmur,or rub  Respiratory:  No crackles, no wheeze  Gastrointestinal:  Soft, nontender, nondistended, NABS  Ext: no edema, feet warm  Skin: dry, no rash  Neuro: awake, alert, interactive      DATA:    Recent Labs     05/22/22  0626 05/23/22  0935 05/24/22  0657   WBC 9.6 8.1 8.4   HGB 9.4* 9.8* 9.4*   HCT 30.0* 32.1* 29.9*   .1* 107.4* 107.2*    184 185     Recent Labs     05/22/22  0626 05/23/22  0935 05/24/22  0657    137 135   K 5.3* 4.8 4.4   CL 98 100 98   CO2 29 27 25   BUN 44* 46* 50*   CREATININE 2.2* 1.9* 1.9*   ALT 28 39* 29   AST 32* 38* 19   BILITOT 0.4 0.3 0.3   ALKPHOS 67 68 61       Lab Results   Component Value Date    LABPROT 0.2 03/12/2022    LABPROT 0.2 03/12/2022       ASSESSMENT / RECOMMENDATIONS:    ASSESSMENT AND PLAN:  1.  COPD exacerbation, probably contributing to her hypoxia and we will  place her on DuoNeb nebulizer every 6 hours and will avoid antibiotic if  possible as she has very minimal cough and because of her history of C.  difficile, we will get a V/Q scan to rule out PE since her D-dimer was  slightly elevated. 2.  Acute-on-chronic renal failure.  Her creatinine was 2.  Her baseline  was 1.5 to 1.6.  We will hold her Lasix and monitor her creatinine. History of renal failure, status post renal transplant in 2015. BUN today is 44 and creatinine is 2.2  3.  History of left femoral and popliteal vein DVT.  We will resume her  Eliquis for at least six months. Chelle Hahn was diagnosed in February. 4.  History of SVT and tachycardia.  She is on metoprolol. 5.  Hyperlipidemia.  Resume her statin. 6.  Hypothyroidism, resume her Synthroid. 7.  History of DVT in the left to mid and distal femoral vein and  popliteal vein status post IVC filter placed in 02/2022 for possible GI  bleed at that time. 8.  Anemia of chronic disease. 9.  History of C. difficile colitis. 10.  History of ESBL in the urine.  We will check urinalysis,  microscopy, and culture and sensitivity. We will follow evaluation on daily basis reviewing the patient and assessing clinical condition.   Electronically signed by Aniya Wilder MD on 5/24/2022 at 12:17 PM

## 2022-05-24 NOTE — CARE COORDINATION
Ss note:5/24/2022.10:47 AM Neg Rapid covid result yesterday. Pt is from [facility] skilled rehab, plan is to return, NO PRECERT. Will need signed GINA. SW will follow for possible discharge today.  AG Humphries

## 2022-05-24 NOTE — CARE COORDINATION
Ss note:5/24/2022.1:05 PM Addendum: Physician ambulette to transfer pt back to [facility] skilled care today at 5:00 pm. Awaiting discharge order, facility liaison, nursing and son notified of discharge today. AG Boyce    Ss note:.5/24/2022. 11:20 AM Neg rapid covid yesterday. Arranged Physician Ambulette transfer back to [facility] skilled rehab today at 2:30 pm. Facility liaison, nursing, pt and son aware.  AG Boyce

## 2022-05-25 NOTE — DISCHARGE SUMMARY
[address]                               DISCHARGE SUMMARY    PATIENT NAME: Alma Romo              :        1955  MED REC NO:   [id number]                            ROOM:       [id number]  ACCOUNT NO:   [de-identified]                           ADMIT DATE: 2022  PROVIDER:     Jersey Acevedo MD                  DISCHARGE DATE:  2022      DISCHARGE DIAGNOSES:  1. COPD exacerbation. 2.  Acute-on-chronic renal failure with baseline creatinine 1.5, upon  discharge was 1.9.  3.  History of left femoral and popliteal DVT on Eliquis diagnosed in  2022 for 6 months till the end of 2022. 4.  History of SVT and tachycardia. 5.  Hyperlipidemia. 6.  Hypothyroidism. 7.  Anemia of chronic disease. 8.  History of C. Diff. 9.  History of ESBL infection in the urine. 10.  Generalized debility. 11.  Renal transplant in . 12.  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 HOSPITAL COURSE:  This is a 21-year-old white lady with significant past  medical history as stated above who was in rehab in [facility] and she  was having increasing shortness of breath over the last few days and her  oxygenation dropped. She was brought to the emergency room. She was  found to be in COPD exacerbation. Chest x-ray did not reveal any acute  abnormality. She was placed on Solu-Medrol and aerosol treatments. Pulmonology was consulted. Eventually that was changed to prednisone  p.o. and it was tapered down. The patient continued to do well, but her  creatinine started getting worse to 2.2. Her baseline is about 1.5. She was given IV hydration and her Lasix was stopped. Her creatinine  got better to 1.9. Dr. Bird Mane, Nephrology was consulted and she was  feeling much better.   She was then discharged back to rehab in stable  condition, to check her renal function in a week and she is on the  tapering dose of vancomycin p.o. She did have some urinary retention in  the beginning needing Chin catheter but her Chin catheter was taken  out the day prior to her discharge and she was urinating well. We  continued her Augmentin and Cipro for her ESBL urinary tract infection  which she started in the nursing home. She has four more days left but  urine analysis was done in the hospital did not reveal any sign of  infection. The patient was discharged in stable condition on the  following medications, Cipro 500 mg daily for four more days, Augmentin  1 tablet twice a day for four more days, DuoNeb nebulizer every 6 hours,  Eliquis 5 mg twice a day till the end of August 2022, Medrol Dosepak to  use as directed, Pulmicort nebulizer 1 ampule twice a day, Mucinex 600  twice a day, vancomycin 125 twice a day in tapering dosages, Benadryl 25  every 6 hours as needed for itching, Clozaril 50 mg daily, acyclovir 400  twice a day, simethicone 160 three times a day as needed, sodium bicarb  650 daily, magnesium 400 mg daily, Lopressor 12.5 twice a day, Protonix  40 mg daily, Lipitor 10 mg daily, allopurinol 100 daily, Pristiq 50 mg  daily, cyclosporin 100 mg twice a day, folic acid, Synthroid 50 mcg  daily. The patient is to follow up with myself in the office after  discharge from rehab in 1-2 weeks after discharge and to check her BMP  for following on her renal function in a week.         Hellen Sim MD    D: 05/24/2022 19:39:57       T: 05/24/2022 19:47:05     [email or web address]  Job#: [id number]     Doc#: [id number]    CC:

## 2022-05-31 NOTE — PROGRESS NOTES
Physician Progress Note      PATIENT:               Issa Barrow  CSN #:                  [id number]  :                       1955  ADMIT DATE:       2022 4:45 PM  [address] [place] DATE:        2022 4:00 PM  RESPONDING  PROVIDER #:        Sara Davila MD          QUERY TEXT:    Pt admitted with Urinary tract infection contributing to sepsis. Pt noted to   have history of kidney transplant. Please document if you were treating or   evaluating any of the below:    Urinary tract infection involving transplanted kidney  Urinary tract infection not involving transplanted kidney    The medical record reflects the following:  Risk Factors: Cellcep, UTI, Sepsis, Kidney transplant in   Clinical Indicators: DC summary:\" Her baseline creatinine was about 1.5, but   upon discharge, it was actually 1.0 which is the best it has been for a long   time. \"  Treatment: IVF,  ID consult, vanco iv, zosyn iv, meropenem  iv, lab monitoring   and assessments. Thank you for your assistance,        Options provided:  -- Respond - Create new note now  -- Disagree - Not applicable / Not valid  -- Disagree - Clinically unable to determine / Unknown  -- Refer to Clinical Documentation Reviewer    PROVIDER RESPONSE TEXT:    UTI affecting transplant kidney , immunocompromised host    Query created by:  Ross Ramirez on 2022 2:11 PM      Electronically signed by:  Sara Davila MD 2022 7:18 PM

## 2022-06-28 ENCOUNTER — OFFICE VISIT (OUTPATIENT)
Dept: CARDIOLOGY CLINIC | Age: 67
End: 2022-06-28
Payer: MEDICARE

## 2022-06-28 VITALS
HEIGHT: 62 IN | DIASTOLIC BLOOD PRESSURE: 62 MMHG | HEART RATE: 82 BPM | SYSTOLIC BLOOD PRESSURE: 118 MMHG | BODY MASS INDEX: 36.8 KG/M2 | RESPIRATION RATE: 16 BRPM | WEIGHT: 200 LBS

## 2022-06-28 DIAGNOSIS — I42.8 NONISCHEMIC CARDIOMYOPATHY (HCC): Primary | ICD-10-CM

## 2022-06-28 PROCEDURE — 93000 ELECTROCARDIOGRAM COMPLETE: CPT | Performed by: INTERNAL MEDICINE

## 2022-06-28 PROCEDURE — G8417 CALC BMI ABV UP PARAM F/U: HCPCS | Performed by: INTERNAL MEDICINE

## 2022-06-28 PROCEDURE — 99214 OFFICE O/P EST MOD 30 MIN: CPT | Performed by: INTERNAL MEDICINE

## 2022-06-28 PROCEDURE — 1123F ACP DISCUSS/DSCN MKR DOCD: CPT | Performed by: INTERNAL MEDICINE

## 2022-06-28 PROCEDURE — G8427 DOCREV CUR MEDS BY ELIG CLIN: HCPCS | Performed by: INTERNAL MEDICINE

## 2022-06-28 PROCEDURE — 1036F TOBACCO NON-USER: CPT | Performed by: INTERNAL MEDICINE

## 2022-06-28 PROCEDURE — 3017F COLORECTAL CA SCREEN DOC REV: CPT | Performed by: INTERNAL MEDICINE

## 2022-06-28 PROCEDURE — G8400 PT W/DXA NO RESULTS DOC: HCPCS | Performed by: INTERNAL MEDICINE

## 2022-06-28 PROCEDURE — 1090F PRES/ABSN URINE INCON ASSESS: CPT | Performed by: INTERNAL MEDICINE

## 2022-06-28 RX ORDER — FUROSEMIDE 20 MG/1
20 TABLET ORAL DAILY
COMMUNITY
End: 2022-09-15

## 2022-06-28 RX ORDER — PREDNISONE 1 MG/1
5 TABLET ORAL DAILY
COMMUNITY
Start: 2022-05-27

## 2022-06-28 NOTE — PROGRESS NOTES
OFFICE VISIT     PRIMARY CARE PHYSICIAN:      Linda Ng MD       ALLERGIES / SENSITIVITIES:        Allergies   Allergen Reactions    Macrodantin [Nitrofurantoin Macrocrystal]     Sulfa Antibiotics           REVIEWED MEDICATIONS:        Current Outpatient Medications:     furosemide (LASIX) 20 MG tablet, Take 20 mg by mouth daily, Disp: , Rfl:     predniSONE (DELTASONE) 5 MG tablet, Take 5 mg by mouth daily , Disp: , Rfl:     acyclovir (ZOVIRAX) 400 MG tablet, Take 1 tablet by mouth 2 times daily, Disp: 60 tablet, Rfl: 0    ipratropium-albuterol (DUONEB) 0.5-2.5 (3) MG/3ML SOLN nebulizer solution, Inhale 3 mLs into the lungs 4 times daily, Disp: 360 mL, Rfl: 1    apixaban (ELIQUIS) 5 MG TABS tablet, Take 1 tablet by mouth 2 times daily Till end of august 2022, Disp: 60 tablet, Rfl: 2    budesonide (PULMICORT) 0.5 MG/2ML nebulizer suspension, Take 1 ampule by nebulization 2 times daily, Disp: , Rfl:     guaiFENesin (MUCINEX) 600 MG extended release tablet, Take 600 mg by mouth 2 times daily , Disp: , Rfl:     diphenhydrAMINE (BENADRYL) 25 MG tablet, Take 25 mg by mouth every 6 hours as needed for Itching or Allergies, Disp: , Rfl:     cloZAPine (CLOZARIL) 50 MG tablet, Take 50 mg by mouth nightly, Disp: , Rfl:     acetaminophen (TYLENOL) 325 MG tablet, Take 650 mg by mouth every 6 hours as needed for Pain or Fever , Disp: , Rfl:     vitamin D (CHOLECALCIFEROL) 25 MCG (1000 UT) TABS tablet, Take 1,000 Units by mouth daily, Disp: , Rfl:     simethicone (MYLICON) 80 MG chewable tablet, Take 2 tablets by mouth 3 times daily as needed (cramping), Disp: 180 tablet, Rfl: 3    sodium bicarbonate 650 MG tablet, Take 1 tablet by mouth daily, Disp: , Rfl:     magnesium oxide (MAG-OX) 400 MG tablet, Take 1 tablet by mouth daily, Disp: 30 tablet, Rfl: 1    metoprolol tartrate (LOPRESSOR) 25 MG tablet, Take 0.5 tablets by mouth 2 times daily, Disp: 60 tablet, Rfl: 3    pantoprazole (PROTONIX) 40 MG tablet, Take 1 tablet by Clotting disorder ([facility] [address] 1985    thrombophlebitis after c section delivery    Depression     15 years followed by dr Lakshmi Rodriguez    Hemodialysis patient [facility])     History of blood transfusion     Hyperlipidemia     Hypertension     Hypothyroidism     Leg swelling 2020    Lymphedema of both lower extremities 2020    Peripheral vascular disease ([facility] [address]     Renal failure 2012    renal transplant    Thrombophlebitis     after c section delivery    Thyroid disease             Past Surgical History:   Procedure Laterality Date    [address]    one normal delivery    COLECTOMY N/A 1/15/2022    DIAGNOSTIC  LAPAROSCOPY LYSIS OF ADHESIONS performed by Lanre Calderon MD at [facility] CATH LAB PROCEDURE      normal coronaries and  normal coronaries    DIALYSIS FISTULA CREATION  march and 2012    phase one and two patient will start dialysis when needed    [address]    transfemoral venous bypass grafts    IR IVC FILTER PLACEMENT W IMAGING  2022    IR IVC FILTER PLACEMENT W IMAGING 2022 [facility] SPECIAL PROCEDURES    KIDNEY TRANSPLANT  2016    KIDNEY TRANSPLANT  2015    KIDNEY TRANSPLANT  2015    PARATHYROIDECTOMY  2006    left parathyroid  implant and parathyroidectomy    TRANSESOPHAGEAL ECHOCARDIOGRAM N/A 2022    TRANSESOPHAGEAL ECHOCARDIOGRAM WITH BUBBLE STUDY performed by Romelia Dooley MD at [address] [facility] History   Problem Relation Age of Onset    Diabetes Mother     Diabetes Father     Dementia Father           Social History     Tobacco Use    Smoking status: Former Smoker     Packs/day: 1.00     Years: 20.00     Pack years: 20.00     Quit date: 10/1/2015     Years since quittin.7    Smokeless tobacco: Never Used   Substance Use Topics    Alcohol use: No     Comment: 2 cups coffee per day         Review of Systems:    Constitutional: negative for fever and chills, or significant weight loss  HEENT: negative for acute visual symptoms or auditory problems, no dysphagia  Respiratory: negative for cough, wheezing, or hemoptysis  Cardiovascular: negative for chest pain, palpitations, and dyspnea  Gastrointestinal: negative for abdominal pain, diarrhea, melena, nausea and vomiting  Endocrine: Negative for polyuria and polydyspsia  Genitourinary: negative for dysuria and hematuria  Derm: negative for rash and skin lesion(s)  Neurological: negative for tingling, numbness, weakness, seizures  Endocrine: negative for polydipsia and polyuria  Musculoskeletal: negative for pain or tenderness  Psychiatric: negative for anxiety, depression, or suicidal ideations         O:  COMPLETE PHYSICAL EXAM:       /62   Pulse 82   Resp 16   Ht 5' 2\" (1.575 m)   Wt 200 lb (90.7 kg)   BMI 36.58 kg/m²       General:   Patient alert, comfortable, no distress. Appears stated age. HEENT:    Pupils equal, no icterus; Tongue moist.   Neck:              No masses, Thyroid not palpable. No elevated JVD, No carotid bruit. Chest:   Normal configuration, non tender. Lungs:   Clear to auscultation bilaterally except few scattered rhonchi. Cardiovascular:  Regular rhythm, 2/6 systolic murmur, No S3, no palpable thrills. Abdomen:  Soft, Bowel sounds normal, no pulsatile abdominal aorta, no palpable masses. Extremities:  ++ edema. Distal pulses palpable. No cyanosis, no clubbing. Skin:   Good turgor, warm and dry, no cyanosis. Musculoskeletal: No joint swelling or deformity. Neuro:   Cranial nerves grossly intact; No focal neurologic deficit. Psych:   Alert, good mood and effect. REVIEW OF DIAGNOSTIC TESTS:        Electrocardiogram: Reviewed     CLEMENCIA 2/28/22   Summary   Technically difficult study. No definate evidence of vegetation consistent with endocarditis. Normal LV segmental wall motion. Ejection fraction is visually estimated at 60 to 65%. Normal right ventricular size and function.      2D Echo: 1/19/2022 Summary   Mild concentric left ventricular hypertrophy. There is doppler evidence of stage I diastolic dysfunction. Ejection fraction is visually estimated at 60 to 65%. Normal right ventricular size and function. Mild tricuspid regurgitation                   ASSESSMENT / PLAN:    Jason Renner was seen today for cardiomyopathy and follow-up from hospital.    Diagnoses and all orders for this visit:      Hx of Nonischemic cardiomyopathy ([facility] [address] - EF 35-38% by Echo 12/2019 - EF normalized 2/2022- Compensated;  Continue BB, ACE-I  -     EKG 12 lead     Essential hypertension: Controlled on current medicaitons     LBBB (left bundle branch block): Chronic     Peripheral vascular disease ([facility] [address]  s/p ? Femoral bypass in ?1985-88, Dr Kleber Ornelas with Vascular - Dr Jigar Whitten     ESRD (end stage renal disease) [facility]) due to Lithium Toxicity - Follows with Dr Vaughn Dickinson     Non morbid obesity due to excess calories - Diet, exercise and weight loss discussed. S/p cadaver renal transplant in 2015 in [address] [place] [address]; - Follows with Dr Vaughn Dickinson    Hx of DVT - Left leg - On Eliquis    Preventive Cardiology: Low cholesterol diet, regular exercise as tolerate, and gradual weight loss discussed. Above recommendations discussed. The patient's current medication list, allergies, problem list and results of prior tests (as available) were reviewed at today's visit   All questions answered about cardiac diagnoses and cardiac medications. Continue current medications. Monitor BP and heart rates. Compliance with medications and f/u with all physicians discussed. Risk factor modification based on risk profile discussed. Call if any exertional chest pain, short of breath, dizzy or palpitations. Follow up in 6 months or earlier if needed.          [facility]  [address] [facility], [facility]  [phone]

## 2022-07-29 NOTE — PROGRESS NOTES
Comprehensive Nutrition Assessment    Type and Reason for Visit:  Reassess    Nutrition Recommendations/Plan: Patient stated she can tolerate small amounts of meals at a time. Discussed ONS options and recommended eating them in between meals to help meet estimated nutrient needs. Patient agreed to try Boost Pudding/Gelatein, will add BID, instructed to keep, as a snack, when sent up with meals. Will continue to monitor. Nutrition Assessment:  Pt remains at nutritional risk w/ poor PO, now on modified diet per SLP (3/20), s/p NG w/ TF, now d/c 2/2 adm lethargic w/ AMS s/p recent adm w/ resp failure/asp PNA. Note DVT s/p IVC filter, C-Diff colitis. Hx CKD s/p kidney transplant (2015), CHF, COPD. Start Boost pudding/Gelatein BID & monitor. Malnutrition Assessment:  Malnutrition Status: At risk for malnutrition (Comment)    Context:  Acute Illness     Findings of the 6 clinical characteristics of malnutrition:  Energy Intake:  7 - 50% or less of estimated energy requirements for 5 or more days  Weight Loss:  No significant weight loss     Body Fat Loss:  No significant body fat loss     Muscle Mass Loss:  No significant muscle mass loss    Fluid Accumulation:  No significant fluid accumulation     Strength:  Not Performed    Estimated Daily Nutrient Needs:  Energy (kcal):  ; Weight Used for Energy Requirements:  Current     Protein (g):  75-85 (1.5-1.7gm/kg(monitor renal status)); Weight Used for Protein Requirements:  Ideal        Fluid (ml/day):  ; Method Used for Fluid Requirements:  1 ml/kcal      Nutrition Related Findings:  A&Ox4, soft/round abd, active BS, +3LLE/LUE, +2RLE/+1RUE edema, +3.9L I/Os      Wounds:  Pressure Injury (per LDA no wound consult ordered)       Current Nutrition Therapies:    ADULT TUBE FEEDING; Nasogastric; Renal Formula; Continuous; 40; No; 30; Q 4 hours  ADULT DIET;  Dysphagia - Soft and Bite Sized    Anthropometric Measures:  · Height: 5' 2\" (157.5 Please return to the emergency department for any shortness of breath, chest pain, sweats, or nausea/vomiting.   A referral was placed to cardiology for follow-up. They will call you to set up an appointment.   Please return for any new, worsening or persistent symptoms that are concerning. Take all medications as prescribed and follow up with your doctor or the person you have been referred to today. Thank you for allowing us to participate in your care today!    cm)  · Current Body Weight: 226 lb (102.5 kg) (3/15 bed scale per RD)   · Admission Body Weight: 220 lb (99.8 kg) (3/9 stated wt)    · Usual Body Weight: 216 lb (98 kg) (1/10/22 bed scale wt per emr ,11/17/21 227# bed scale)     · Ideal Body Weight: 110 lbs; % Ideal Body Weight 205.5 %   · BMI: 41.3  · BMI Categories: Obese Class 3 (BMI 40.0 or greater)       Nutrition Diagnosis:   · Inadequate oral intake related to cognitive or neurological impairment (increased lethargy, SLP rec.) as evidenced by poor intake prior to admission,intake 26-50%    Nutrition Interventions:   Food and/or Nutrient Delivery:  Continue Current Diet,Start Oral Nutrition Supplement (Boost Pudding/Gelatein BID)  Nutrition Education/Counseling:  Education not indicated   Coordination of Nutrition Care:  Continue to monitor while inpatient    Goals:  Pt consumes >50% meals/ONS       Nutrition Monitoring and Evaluation:   Behavioral-Environmental Outcomes:  None Identified   Food/Nutrient Intake Outcomes:  Food and Nutrient Intake,Supplement Intake  Physical Signs/Symptoms Outcomes:  Biochemical Data,Nutrition Focused Physical Findings,Skin,Weight,Chewing or Swallowing,GI Status,Fluid Status or Edema     Discharge Planning:     Too soon to determine     Electronically signed by Mark Hernandez, MS, RD, LD on 3/22/22 at 10:24 AM EDT    Contact: [phone]

## 2022-09-15 ENCOUNTER — OFFICE VISIT (OUTPATIENT)
Dept: INTERNAL MEDICINE CLINIC | Age: 67
End: 2022-09-15
Payer: MEDICARE

## 2022-09-15 VITALS
BODY MASS INDEX: 35.51 KG/M2 | OXYGEN SATURATION: 95 % | HEIGHT: 62 IN | DIASTOLIC BLOOD PRESSURE: 80 MMHG | HEART RATE: 88 BPM | TEMPERATURE: 97 F | SYSTOLIC BLOOD PRESSURE: 122 MMHG | WEIGHT: 193 LBS

## 2022-09-15 DIAGNOSIS — R73.01 IMPAIRED FASTING GLUCOSE: ICD-10-CM

## 2022-09-15 DIAGNOSIS — Z78.0 POSTMENOPAUSAL: ICD-10-CM

## 2022-09-15 DIAGNOSIS — N18.32 STAGE 3B CHRONIC KIDNEY DISEASE (HCC): ICD-10-CM

## 2022-09-15 DIAGNOSIS — A04.71 RECURRENT COLITIS DUE TO CLOSTRIDIOIDES DIFFICILE: ICD-10-CM

## 2022-09-15 DIAGNOSIS — D68.9 CLOTTING DISORDER (HCC): ICD-10-CM

## 2022-09-15 DIAGNOSIS — Z86.19 HISTORY OF HERPES ENCEPHALITIS: ICD-10-CM

## 2022-09-15 DIAGNOSIS — C80.1 CANCER (HCC): ICD-10-CM

## 2022-09-15 DIAGNOSIS — Z12.31 ENCOUNTER FOR SCREENING MAMMOGRAM FOR MALIGNANT NEOPLASM OF BREAST: Primary | ICD-10-CM

## 2022-09-15 DIAGNOSIS — I82.562 CHRONIC DEEP VEIN THROMBOSIS (DVT) OF CALF MUSCLE VEIN OF LEFT LOWER EXTREMITY (HCC): ICD-10-CM

## 2022-09-15 DIAGNOSIS — J44.1 ACUTE EXACERBATION OF CHRONIC OBSTRUCTIVE PULMONARY DISEASE (COPD) (HCC): ICD-10-CM

## 2022-09-15 DIAGNOSIS — E66.01 SEVERE OBESITY (BMI 35.0-39.9) WITH COMORBIDITY (HCC): ICD-10-CM

## 2022-09-15 DIAGNOSIS — Z94.0 HISTORY OF RENAL TRANSPLANT: ICD-10-CM

## 2022-09-15 PROCEDURE — G8427 DOCREV CUR MEDS BY ELIG CLIN: HCPCS | Performed by: INTERNAL MEDICINE

## 2022-09-15 PROCEDURE — 99215 OFFICE O/P EST HI 40 MIN: CPT | Performed by: INTERNAL MEDICINE

## 2022-09-15 PROCEDURE — 3023F SPIROM DOC REV: CPT | Performed by: INTERNAL MEDICINE

## 2022-09-15 PROCEDURE — 1123F ACP DISCUSS/DSCN MKR DOCD: CPT | Performed by: INTERNAL MEDICINE

## 2022-09-15 PROCEDURE — G8400 PT W/DXA NO RESULTS DOC: HCPCS | Performed by: INTERNAL MEDICINE

## 2022-09-15 PROCEDURE — G8417 CALC BMI ABV UP PARAM F/U: HCPCS | Performed by: INTERNAL MEDICINE

## 2022-09-15 PROCEDURE — 1090F PRES/ABSN URINE INCON ASSESS: CPT | Performed by: INTERNAL MEDICINE

## 2022-09-15 PROCEDURE — 3017F COLORECTAL CA SCREEN DOC REV: CPT | Performed by: INTERNAL MEDICINE

## 2022-09-15 PROCEDURE — 1036F TOBACCO NON-USER: CPT | Performed by: INTERNAL MEDICINE

## 2022-09-15 RX ORDER — SIMVASTATIN 20 MG
20 TABLET ORAL NIGHTLY
COMMUNITY

## 2022-09-15 RX ORDER — ACYCLOVIR 400 MG/1
400 TABLET ORAL
COMMUNITY
End: 2022-09-15

## 2022-09-15 RX ORDER — DOCUSATE SODIUM 100 MG/1
100 CAPSULE, LIQUID FILLED ORAL 2 TIMES DAILY
COMMUNITY

## 2022-09-15 RX ORDER — FUROSEMIDE 20 MG/1
20 TABLET ORAL DAILY PRN
Qty: 60 TABLET | Refills: 0
Start: 2022-09-15

## 2022-09-15 RX ORDER — VANCOMYCIN HYDROCHLORIDE 125 MG/1
125 CAPSULE ORAL
COMMUNITY

## 2022-09-15 SDOH — ECONOMIC STABILITY: FOOD INSECURITY: WITHIN THE PAST 12 MONTHS, THE FOOD YOU BOUGHT JUST DIDN'T LAST AND YOU DIDN'T HAVE MONEY TO GET MORE.: NEVER TRUE

## 2022-09-15 SDOH — ECONOMIC STABILITY: FOOD INSECURITY: WITHIN THE PAST 12 MONTHS, YOU WORRIED THAT YOUR FOOD WOULD RUN OUT BEFORE YOU GOT MONEY TO BUY MORE.: NEVER TRUE

## 2022-09-15 ASSESSMENT — ENCOUNTER SYMPTOMS
RHINORRHEA: 0
ABDOMINAL DISTENTION: 0
WHEEZING: 0
ABDOMINAL PAIN: 0
BLOOD IN STOOL: 0
NAUSEA: 0
DIARRHEA: 0
CHEST TIGHTNESS: 0
SINUS PAIN: 0
COUGH: 0
SHORTNESS OF BREATH: 0
FACIAL SWELLING: 0
CONSTIPATION: 0

## 2022-09-15 ASSESSMENT — SOCIAL DETERMINANTS OF HEALTH (SDOH): HOW HARD IS IT FOR YOU TO PAY FOR THE VERY BASICS LIKE FOOD, HOUSING, MEDICAL CARE, AND HEATING?: NOT HARD AT ALL

## 2022-09-15 NOTE — PROGRESS NOTES
Al Kahn (:  1955) is a 78 y.o. female,Established patient, here for evaluation of the following chief complaint(s):  Check-Up (Regular office visit)         ASSESSMENT/PLAN:  1. Encounter for screening mammogram for malignant neoplasm of breast  -     NIEVES DIGITAL SCREEN W OR WO CAD BILATERAL; Future  Prescription for mammogram given  2. Severe obesity (BMI 35.0-39. 9) with comorbidity ([facility]  -     NIEVES DIGITAL SCREEN W OR WO CAD BILATERAL; Future  -     DEXA BONE DENSITY AXIAL SKELETON; Future  -     Vitamin D 25 Hydroxy; Future  -     Vitamin B12 & Folate; Future  -     Hemoglobin A1C; Future  -     Lipid, Fasting; Future  -     T4, Free; Future  -     TSH; Future  -     Comprehensive Metabolic Panel; Future  -     CBC with Auto Differential; Future  Patient is trying to watch her diet she lost weight since she was in the hospital for prolonged period of time and then was in rehab she got discharged from rehab mid July and also she has been home for almost 2 months, has been doing well got discharged already from visiting nurses and home PT she walks with a Rollator    3. Clotting disorder ([facility] she did have left popliteal and femoral vein DVT and PE she has IVC filter placed that was in 2022 and she is on Eliquis I will check her again for thrombophilia profile and iron if negative and her DVT is resolved I will take her off the Eliquis    4. Postmenopausal  -     DEXA BONE DENSITY AXIAL SKELETON; Future  5. Impaired fasting glucose  -     NIEVES DIGITAL SCREEN W OR WO CAD BILATERAL; Future  -     DEXA BONE DENSITY AXIAL SKELETON; Future  -     Vitamin D 25 Hydroxy; Future  -     Vitamin B12 & Folate; Future  -     Hemoglobin A1C; Future  -     Lipid, Fasting; Future  -     T4, Free; Future  -     TSH; Future  -     Comprehensive Metabolic Panel; Future  -     CBC with Auto Differential; Future  Will check A1c level decrease carbs in diet  6.  Stage 3b chronic kidney disease ([facility]  - NIEVES DIGITAL SCREEN W OR WO CAD BILATERAL; Future  -     DEXA BONE DENSITY AXIAL SKELETON; Future  -     Vitamin D 25 Hydroxy; Future  -     Vitamin B12 & Folate; Future  -     Hemoglobin A1C; Future  -     Lipid, Fasting; Future  -     T4, Free; Future  -     TSH; Future  -     Comprehensive Metabolic Panel; Future  -     CBC with Auto Differential; Future  Chronic renal failure her creatinine baseline is 1.58 stable she is off Lasix for now due to her renal function she just followed with her   Transplant doctor in [facility] clinic. She had a renal transplant in 2015.    7.  History of herpes encephalitis when she was in the hospital she stated long time unconscious and then when she got better, she is still on acyclovir I discussed with pt and brennon her cousin  to check with  Dr. Khadijah Mirza next week if she would be taken off the acyclovir. 8.  Recurrent C. difficile she is on vancomycin tapering dose and now she is on vancomycin every 3 days    9. Patient had slight dysuria  Gave her To check her urine she will drop it off to the office in the next few days. If any fever or problems she will let me know right away. Discussed blood work      Return in about 2 months (around 11/15/2022). Subjective   SUBJECTIVE/OBJECTIVE:  HPI  Patient has been getting stronger and trying to get better she is doing very well  Living on her own, she gets a lot of help from her and her daughter and son  Her cousin    Review of Systems   Constitutional:  Negative for appetite change, chills, fatigue, fever and unexpected weight change. HENT:  Negative for congestion, facial swelling, mouth sores, rhinorrhea and sinus pain. Eyes:  Negative for visual disturbance. Respiratory:  Negative for cough, chest tightness, shortness of breath and wheezing. Cardiovascular:  Negative for chest pain and leg swelling.    Gastrointestinal:  Negative for abdominal distention, abdominal pain, blood in stool, constipation, diarrhea and nausea. Genitourinary:  Negative for difficulty urinating, dysuria, frequency and urgency. Musculoskeletal:  Positive for gait problem. Negative for joint swelling. Walks with rollator   Skin:  Negative for rash. Neurological:  Negative for dizziness, syncope, weakness, light-headedness and headaches. Psychiatric/Behavioral:  Negative for behavioral problems, confusion and hallucinations.          Objective   /80 (Site: Right Upper Arm, Position: Sitting, Cuff Size: Large Adult)   Pulse 88   Temp 97 °F (36.1 °C) (Temporal)   Ht 5' 2\" (1.575 m)   Wt 193 lb (87.5 kg)   SpO2 95%   BMI 35.30 kg/m²   CBC with Differential:    Lab Results   Component Value Date/Time    WBC 8.4 05/24/2022 06:57 AM    RBC 2.79 05/24/2022 06:57 AM    HGB 9.4 05/24/2022 06:57 AM    HCT 29.9 05/24/2022 06:57 AM     05/24/2022 06:57 AM    .2 05/24/2022 06:57 AM    MCH 33.7 05/24/2022 06:57 AM    MCHC 31.4 05/24/2022 06:57 AM    RDW 16.8 05/24/2022 06:57 AM    NRBC 0.9 03/22/2022 06:17 AM    METASPCT 1.7 03/22/2022 06:17 AM    LYMPHOPCT 5.8 05/24/2022 06:57 AM    MONOPCT 4.8 05/24/2022 06:57 AM    MYELOPCT 0.9 05/22/2022 06:26 AM    BASOPCT 0.1 05/24/2022 06:57 AM    MONOSABS 0.40 05/24/2022 06:57 AM    LYMPHSABS 0.49 05/24/2022 06:57 AM    EOSABS 0.00 05/24/2022 06:57 AM    BASOSABS 0.01 05/24/2022 06:57 AM     CMP:    Lab Results   Component Value Date/Time     05/24/2022 06:57 AM    K 4.4 05/24/2022 06:57 AM    K 4.3 04/21/2022 05:22 PM    CL 98 05/24/2022 06:57 AM    CO2 25 05/24/2022 06:57 AM    BUN 50 05/24/2022 06:57 AM    CREATININE 1.9 05/24/2022 06:57 AM    GFRAA 32 05/24/2022 06:57 AM    LABGLOM 26 05/24/2022 06:57 AM    GLUCOSE 139 05/24/2022 06:57 AM    PROT 5.5 05/24/2022 06:57 AM    LABALBU 3.0 05/24/2022 06:57 AM    CALCIUM 9.6 05/24/2022 06:57 AM    BILITOT 0.3 05/24/2022 06:57 AM    ALKPHOS 61 05/24/2022 06:57 AM    AST 19 05/24/2022 06:57 AM    ALT 29 05/24/2022 06:57 AM     Lipid: No results found for: CHOL  No results found for: TRIG  No results found for: HDL  No results found for: LDLCHOLESTEROL, LDLCALC  No results found for: LABVLDL, VLDL  No results found for: CHOLHDLRATIO  Microalbumen/Creatinine ratio:  No components found for: RUCREAT  TSH:    Lab Results   Component Value Date/Time    TSH 0.101 05/20/2022 08:19 AM     Free T3: No results found for: T3FREE  Free T4:   Lab Results   Component Value Date/Time    T4FREE 1.04 03/10/2022 01:29 PM     Physical Exam  Constitutional:       Appearance: Normal appearance. HENT:      Head: Normocephalic and atraumatic. Mouth/Throat:      Pharynx: Oropharynx is clear. Eyes:      Extraocular Movements: Extraocular movements intact. Pupils: Pupils are equal, round, and reactive to light. Cardiovascular:      Rate and Rhythm: Normal rate and regular rhythm. Pulses: Normal pulses. Heart sounds: Normal heart sounds. No murmur heard. Pulmonary:      Effort: Pulmonary effort is normal.      Breath sounds: Normal breath sounds. Abdominal:      General: Abdomen is flat. There is no distension. Palpations: Abdomen is soft. There is no mass. Tenderness: There is no abdominal tenderness. There is no guarding or rebound. Musculoskeletal:         General: No swelling. Normal range of motion. Cervical back: Normal range of motion and neck supple. Right lower leg: No edema. Left lower leg: No edema. Skin:     General: Skin is warm. Neurological:      General: No focal deficit present. Mental Status: She is alert and oriented to person, place, and time. Mental status is at baseline. An electronic signature was used to authenticate this note.     --Bishop Franco MD

## 2022-10-05 ENCOUNTER — HOSPITAL ENCOUNTER (OUTPATIENT)
Dept: MAMMOGRAPHY | Age: 67
Discharge: HOME | End: 2022-10-07
Payer: MEDICARE

## 2022-10-05 VITALS — HEIGHT: 62 IN | WEIGHT: 200 LBS | BODY MASS INDEX: 36.8 KG/M2

## 2022-10-05 DIAGNOSIS — E66.01 SEVERE OBESITY (BMI 35.0-39.9) WITH COMORBIDITY (HCC): ICD-10-CM

## 2022-10-05 DIAGNOSIS — Z12.31 ENCOUNTER FOR SCREENING MAMMOGRAM FOR MALIGNANT NEOPLASM OF BREAST: ICD-10-CM

## 2022-10-05 DIAGNOSIS — R73.01 IMPAIRED FASTING GLUCOSE: ICD-10-CM

## 2022-10-05 DIAGNOSIS — Z78.0 POSTMENOPAUSAL: ICD-10-CM

## 2022-10-05 DIAGNOSIS — N18.32 STAGE 3B CHRONIC KIDNEY DISEASE (HCC): ICD-10-CM

## 2022-10-05 PROCEDURE — 77080 DXA BONE DENSITY AXIAL: CPT

## 2022-10-05 PROCEDURE — 77067 SCR MAMMO BI INCL CAD: CPT

## 2022-10-07 ENCOUNTER — HOSPITAL ENCOUNTER (EMERGENCY)
Age: 67
Discharge: HOME | End: 2022-10-07
Attending: EMERGENCY MEDICINE
Payer: MEDICARE

## 2022-10-07 VITALS
RESPIRATION RATE: 16 BRPM | OXYGEN SATURATION: 92 % | DIASTOLIC BLOOD PRESSURE: 75 MMHG | SYSTOLIC BLOOD PRESSURE: 122 MMHG | TEMPERATURE: 97.3 F | WEIGHT: 200 LBS | BODY MASS INDEX: 36.58 KG/M2 | HEART RATE: 98 BPM

## 2022-10-07 DIAGNOSIS — N39.0 ACUTE UTI (URINARY TRACT INFECTION): Primary | ICD-10-CM

## 2022-10-07 LAB
ALBUMIN SERPL-MCNC: 3.6 G/DL (ref 3.5–5.2)
ALP BLD-CCNC: 77 U/L (ref 35–104)
ALT SERPL-CCNC: 6 U/L (ref 0–32)
ANION GAP SERPL CALCULATED.3IONS-SCNC: 13 MMOL/L (ref 7–16)
AST SERPL-CCNC: 23 U/L (ref 0–31)
BACTERIA: ABNORMAL /HPF
BASOPHILS ABSOLUTE: 0 E9/L (ref 0–0.2)
BASOPHILS RELATIVE PERCENT: 0.3 % (ref 0–2)
BILIRUB SERPL-MCNC: 0.8 MG/DL (ref 0–1.2)
BILIRUBIN URINE: NEGATIVE
BLOOD, URINE: ABNORMAL
BUN BLDV-MCNC: 25 MG/DL (ref 6–23)
CALCIUM SERPL-MCNC: 10.4 MG/DL (ref 8.6–10.2)
CHLORIDE BLD-SCNC: 99 MMOL/L (ref 98–107)
CLARITY: ABNORMAL
CO2: 24 MMOL/L (ref 22–29)
COLOR: ABNORMAL
CREAT SERPL-MCNC: 1.7 MG/DL (ref 0.5–1)
EKG ATRIAL RATE: 91 BPM
EKG P AXIS: 12 DEGREES
EKG P-R INTERVAL: 158 MS
EKG Q-T INTERVAL: 392 MS
EKG QRS DURATION: 146 MS
EKG QTC CALCULATION (BAZETT): 482 MS
EKG R AXIS: 119 DEGREES
EKG T AXIS: -54 DEGREES
EKG VENTRICULAR RATE: 91 BPM
EOSINOPHILS ABSOLUTE: 0 E9/L (ref 0.05–0.5)
EOSINOPHILS RELATIVE PERCENT: 0 % (ref 0–6)
EPITHELIAL CELLS, UA: ABNORMAL /HPF
GFR AFRICAN AMERICAN: 36
GFR NON-AFRICAN AMERICAN: 30 ML/MIN/1.73
GLUCOSE BLD-MCNC: 94 MG/DL (ref 74–99)
GLUCOSE URINE: NEGATIVE MG/DL
HCT VFR BLD CALC: 38.6 % (ref 34–48)
HEMOGLOBIN: 11.9 G/DL (ref 11.5–15.5)
KETONES, URINE: NEGATIVE MG/DL
LACTIC ACID, SEPSIS: 1.2 MMOL/L (ref 0.5–1.9)
LEUKOCYTE ESTERASE, URINE: ABNORMAL
LYMPHOCYTES ABSOLUTE: 1.34 E9/L (ref 1.5–4)
LYMPHOCYTES RELATIVE PERCENT: 6 % (ref 20–42)
MCH RBC QN AUTO: 34 PG (ref 26–35)
MCHC RBC AUTO-ENTMCNC: 30.8 % (ref 32–34.5)
MCV RBC AUTO: 110.3 FL (ref 80–99.9)
MONOCYTES ABSOLUTE: 0.45 E9/L (ref 0.1–0.95)
MONOCYTES RELATIVE PERCENT: 1.7 % (ref 2–12)
NEUTROPHILS ABSOLUTE: 20.61 E9/L (ref 1.8–7.3)
NEUTROPHILS RELATIVE PERCENT: 92.2 % (ref 43–80)
NITRITE, URINE: NEGATIVE
PDW BLD-RTO: 14.9 FL (ref 11.5–15)
PH UA: 6 (ref 5–9)
PLATELET # BLD: 164 E9/L (ref 130–450)
PMV BLD AUTO: 12.8 FL (ref 7–12)
POTASSIUM REFLEX MAGNESIUM: 4.6 MMOL/L (ref 3.5–5)
PROTEIN UA: 100 MG/DL
RBC # BLD: 3.5 E12/L (ref 3.5–5.5)
RBC UA: ABNORMAL /HPF (ref 0–2)
SODIUM BLD-SCNC: 136 MMOL/L (ref 132–146)
SPECIFIC GRAVITY UA: 1.02 (ref 1–1.03)
TOTAL PROTEIN: 6.8 G/DL (ref 6.4–8.3)
UROBILINOGEN, URINE: 0.2 E.U./DL
WBC # BLD: 22.4 E9/L (ref 4.5–11.5)
WBC UA: ABNORMAL /HPF (ref 0–5)

## 2022-10-07 PROCEDURE — 81001 URINALYSIS AUTO W/SCOPE: CPT

## 2022-10-07 PROCEDURE — 6360000002 HC RX W HCPCS: Performed by: EMERGENCY MEDICINE

## 2022-10-07 PROCEDURE — 83605 ASSAY OF LACTIC ACID: CPT

## 2022-10-07 PROCEDURE — 99284 EMERGENCY DEPT VISIT MOD MDM: CPT

## 2022-10-07 PROCEDURE — 96374 THER/PROPH/DIAG INJ IV PUSH: CPT

## 2022-10-07 PROCEDURE — 36415 COLL VENOUS BLD VENIPUNCTURE: CPT

## 2022-10-07 PROCEDURE — 85025 COMPLETE CBC W/AUTO DIFF WBC: CPT

## 2022-10-07 PROCEDURE — 93005 ELECTROCARDIOGRAM TRACING: CPT | Performed by: EMERGENCY MEDICINE

## 2022-10-07 PROCEDURE — 2580000003 HC RX 258: Performed by: EMERGENCY MEDICINE

## 2022-10-07 PROCEDURE — 80053 COMPREHEN METABOLIC PANEL: CPT

## 2022-10-07 RX ORDER — 0.9 % SODIUM CHLORIDE 0.9 %
1000 INTRAVENOUS SOLUTION INTRAVENOUS ONCE
Status: COMPLETED | OUTPATIENT
Start: 2022-10-07 | End: 2022-10-07

## 2022-10-07 RX ORDER — CALCITRIOL 0.25 UG/1
0.25 CAPSULE, LIQUID FILLED ORAL
COMMUNITY

## 2022-10-07 RX ORDER — SODIUM CHLORIDE 9 MG/ML
INJECTION, SOLUTION INTRAVENOUS PRN
Status: DISCONTINUED | OUTPATIENT
Start: 2022-10-07 | End: 2022-10-07 | Stop reason: HOSPADM

## 2022-10-07 RX ORDER — ONDANSETRON 2 MG/ML
4 INJECTION INTRAMUSCULAR; INTRAVENOUS EVERY 6 HOURS PRN
Status: DISCONTINUED | OUTPATIENT
Start: 2022-10-07 | End: 2022-10-07 | Stop reason: HOSPADM

## 2022-10-07 RX ORDER — CEPHALEXIN 500 MG/1
500 CAPSULE ORAL 3 TIMES DAILY
Qty: 15 CAPSULE | Refills: 0 | Status: SHIPPED | OUTPATIENT
Start: 2022-10-07 | End: 2022-10-12 | Stop reason: SDUPTHER

## 2022-10-07 RX ADMIN — ONDANSETRON 4 MG: 2 INJECTION INTRAMUSCULAR; INTRAVENOUS at 12:57

## 2022-10-07 RX ADMIN — SODIUM CHLORIDE 1000 ML: 9 INJECTION, SOLUTION INTRAVENOUS at 13:00

## 2022-10-07 ASSESSMENT — ENCOUNTER SYMPTOMS
EYE PAIN: 0
DIARRHEA: 0
COUGH: 0
ABDOMINAL DISTENTION: 0
ABDOMINAL PAIN: 0
BLOOD IN STOOL: 0
CHEST TIGHTNESS: 0
SINUS PAIN: 0
NAUSEA: 1
VOMITING: 0
RECTAL PAIN: 0
SHORTNESS OF BREATH: 0
BACK PAIN: 0
WHEEZING: 0
TROUBLE SWALLOWING: 0
SORE THROAT: 0

## 2022-10-07 NOTE — ED NOTES
Pt placed on cont tele and pulse ox, visitor at bedside. Pt in no apparent distress, respirations easy and unlabored. Awaiting orders.       Saray Villalpando RN  10/07/22 1206

## 2022-10-07 NOTE — ED PROVIDER NOTES
Department of Emergency Medicine   ED  Provider Note  Admit Date/RoomTime: 10/7/2022 11:39 AM  ED Room: 21/21                10/7/22  12:21 PM EDT      HISTORY OF PRESENT ILLNESS:  (Nurses Notes Reviewed)    Chief Complaint:   Fatigue (Decreased appetite onset yesterday)          Kathie Rose is a 78 y.o. old female presenting to the emergency department for concerns for urinary tract infection, which occurred prior to arrival.  Since onset the symptoms have been gradually worsening and moderate to severe in severity. Symptoms are associated with chills, dysuria, urinary frequency, and nothing pertinent and denies any vomiting        Review of Systems:   Review of Systems   Constitutional:  Positive for appetite change, chills and fever. HENT:  Negative for ear pain, sinus pain, sore throat and trouble swallowing. Eyes:  Negative for pain. Respiratory:  Negative for cough, chest tightness, shortness of breath and wheezing. Cardiovascular:  Negative for chest pain, palpitations and leg swelling. Gastrointestinal:  Positive for nausea. Negative for abdominal distention, abdominal pain, blood in stool, diarrhea, rectal pain and vomiting. Genitourinary:  Positive for difficulty urinating, dysuria and frequency. Negative for decreased urine volume, flank pain, hematuria, vaginal bleeding, vaginal discharge and vaginal pain. Musculoskeletal:  Negative for back pain, joint swelling and neck pain. Skin:  Negative for rash and wound. Neurological:  Positive for weakness. Negative for dizziness, seizures, speech difficulty, light-headedness, numbness and headaches. Hematological:  Does not bruise/bleed easily. Psychiatric/Behavioral:  Negative for agitation, confusion and suicidal ideas.               --------------------------------------------- PAST HISTORY ---------------------------------------------  Past Medical History:  has a past medical history of Abnormal EKG, Acute exacerbation of chronic obstructive pulmonary disease (COPD) ([facility] Acute gout involving toe of right foot, Acute on chronic combined systolic (congestive) and diastolic (congestive) heart failure ([facility] asthmatic bronchitis, Bipolar disorder ([facility] Cancer ([facility] Cerebrovascular disease, Clotting disorder ([facility] Depression, Hemodialysis patient ([facility] History of blood transfusion, History of renal transplant, Hyperlipidemia, Hypertension, Hypothyroidism, Hypothyroidism, Leg swelling, Leukocytosis, Lymphedema of both lower extremities, Peripheral vascular disease ([facility] Renal failure, Thrombophlebitis, and Thyroid disease. Past Surgical History:  has a past surgical history that includes Diagnostic Cardiac Cath Lab Procedure (); femoral bypass (); parathyroidectomy (); Colonoscopy;  section (); Dialysis fistula creation (march and 2012); Kidney transplant (); Kidney transplant (); Kidney transplant (); colectomy (N/A, 1/15/2022); IR GUIDED IVC FILTER PLACEMENT (2022); and transesophageal echocardiogram (N/A, 2022). Social History:  reports that she quit smoking about 7 years ago. Her smoking use included cigarettes. She has a 20.00 pack-year smoking history. She has never used smokeless tobacco. She reports that she does not drink alcohol and does not use drugs. Family History: family history includes Breast Cancer in her maternal grandmother and sister; Dementia in her father; Diabetes in her father and mother. The patients home medications have been reviewed.     Allergies: Cipro [ciprofloxacin hcl], Macrodantin [nitrofurantoin macrocrystal], and Sulfa antibiotics        ---------------------------------------------------PHYSICAL EXAM--------------------------------------    Constitutional/General: Alert and oriented x3, well appearing, non toxic in NAD  Head: Normocephalic and atraumatic  Eyes: PERRL, EOMI  Mouth: Oropharynx clear, handling secretions,  Neck: Supple, full ROM  Pulmonary: Lungs clear to auscultation bilaterally, no wheezes, rales, or rhonchi. Not in respiratory distress  Cardiovascular:  Regular rate. Regular rhythm. No murmurs, gallops, or rubs. 2+ distal pulses  Chest: no chest wall tenderness  Abdomen: Soft. Non tender. Non distended. +BS. No rebound, guarding, or rigidity. No pulsatile masses appreciated. Musculoskeletal: Moves all extremities x 4. Warm and well perfused. Capillary refill <3 seconds  Skin: warm and dry. No rashes. Neurologic: GCS 15  Psych: Normal Affect      -------------------------------------------------- RESULTS -------------------------------------------------  I have personally reviewed all laboratory and imaging results for this patient. Results are listed below.      LABS:  Results for orders placed or performed during the hospital encounter of 10/07/22   CBC with Auto Differential   Result Value Ref Range    WBC 22.4 (H) 4.5 - 11.5 E9/L    RBC 3.50 3.50 - 5.50 E12/L    Hemoglobin 11.9 11.5 - 15.5 g/dL    Hematocrit 38.6 34.0 - 48.0 %    .3 (H) 80.0 - 99.9 fL    MCH 34.0 26.0 - 35.0 pg    MCHC 30.8 (L) 32.0 - 34.5 %    RDW 14.9 11.5 - 15.0 fL    Platelets 541 564 - 077 E9/L    MPV 12.8 (H) 7.0 - 12.0 fL    Neutrophils % 92.2 (H) 43.0 - 80.0 %    Lymphocytes % 6.0 (L) 20.0 - 42.0 %    Monocytes % 1.7 (L) 2.0 - 12.0 %    Eosinophils % 0.0 0.0 - 6.0 %    Basophils % 0.3 0.0 - 2.0 %    Neutrophils Absolute 20.61 (H) 1.80 - 7.30 E9/L    Lymphocytes Absolute 1.34 (L) 1.50 - 4.00 E9/L    Monocytes Absolute 0.45 0.10 - 0.95 E9/L    Eosinophils Absolute 0.00 (L) 0.05 - 0.50 E9/L    Basophils Absolute 0.00 0.00 - 0.20 E9/L   Comprehensive Metabolic Panel w/ Reflex to MG   Result Value Ref Range    Sodium 136 132 - 146 mmol/L    Potassium reflex Magnesium 4.6 3.5 - 5.0 mmol/L    Chloride 99 98 - 107 mmol/L    CO2 24 22 - 29 mmol/L    Anion Gap 13 7 - 16 mmol/L    Glucose 94 74 - 99 mg/dL    BUN 25 (H) 6 - 23 mg/dL    Creatinine 1.7 (H) 0.5 - 1.0 mg/dL    GFR Non-African American 30 >=60 mL/min/1.73    GFR African American 36     Calcium 10.4 (H) 8.6 - 10.2 mg/dL    Total Protein 6.8 6.4 - 8.3 g/dL    Albumin 3.6 3.5 - 5.2 g/dL    Total Bilirubin 0.8 0.0 - 1.2 mg/dL    Alkaline Phosphatase 77 35 - 104 U/L    ALT 6 0 - 32 U/L    AST 23 0 - 31 U/L   Urinalysis with Microscopic   Result Value Ref Range    Color, UA ORANGE (A) Straw/Yellow    Clarity, UA CLOUDY (A) Clear    Glucose, Ur Negative Negative mg/dL    Bilirubin Urine Negative Negative    Ketones, Urine Negative Negative mg/dL    Specific Gravity, UA 1.020 1.005 - 1.030    Blood, Urine MODERATE (A) Negative    pH, UA 6.0 5.0 - 9.0    Protein,  (A) Negative mg/dL    Urobilinogen, Urine 0.2 <2.0 E.U./dL    Nitrite, Urine Negative Negative    Leukocyte Esterase, Urine MODERATE (A) Negative    WBC, UA 10-20 (A) 0 - 5 /HPF    RBC, UA NONE 0 - 2 /HPF    Epithelial Cells, UA FEW /HPF    Bacteria, UA MANY (A) None Seen /HPF   Lactate, Sepsis   Result Value Ref Range    Lactic Acid, Sepsis 1.2 0.5 - 1.9 mmol/L   EKG 12 Lead   Result Value Ref Range    Ventricular Rate 91 BPM    Atrial Rate 91 BPM    P-R Interval 158 ms    QRS Duration 146 ms    Q-T Interval 392 ms    QTc Calculation (Bazett) 482 ms    P Axis 12 degrees    R Axis 119 degrees    T Axis -54 degrees       RADIOLOGY:  Interpreted by Radiologist.  No orders to display         ------------------------- NURSING NOTES AND VITALS REVIEWED ---------------------------   The nursing notes within the ED encounter and vital signs as below have been reviewed by myself. /75   Pulse 98   Temp 97.3 °F (36.3 °C) (Infrared)   Resp 16   Wt 200 lb (90.7 kg)   SpO2 92%   BMI 36.58 kg/m²   Oxygen Saturation Interpretation: Normal    The patients available past medical records and past encounters were reviewed.         ------------------------------ ED COURSE/MEDICAL DECISION MAKING----------------------  Medications   0.9 % sodium chloride bolus (0 mLs IntraVENous Stopped 10/7/22 1430)             Medical Decision Making:   Patient was seen and examined. Patient had IV, labs, UA. Urinary Tract infection. Uncomplicated. The patient is well appearing and non toxic. Appropriate for outpatient therapy. This patient's ED course included: re-evaluation prior to disposition, IV medications, and a personal history and physicial eaxmination    This patient has remained hemodynamically stable during their ED course. Re-Evaluations:             Re-evaluation. Patients symptoms are improving      Consultations:             None    Critical Care: None        Counseling: The emergency provider has spoken with the patient and discussed todays results, in addition to providing specific details for the plan of care and counseling regarding the diagnosis and prognosis. Questions are answered at this time and they are agreeable with the plan.       --------------------------------- IMPRESSION AND DISPOSITION ---------------------------------    IMPRESSION  1.  Acute UTI (urinary tract infection) New Problem       DISPOSITION  Disposition: Discharge to home  Patient condition is stable               Javan Guerrero MD  10/07/22 [id number]       Javan Guerrero MD  10/17/22 0747

## 2022-10-07 NOTE — ED NOTES
Pt sat 88% on RA when sleeping, placed on 1L NC and O2 increased to 97%. Dr Bauman Single aware, pt does have COPD. Will cont to monitor.       Wendy Kanner, RN  10/07/22 [id number]

## 2022-10-07 NOTE — ED NOTES
Pt medicated per orders, fluids infusing with no complications. Pt resting in bed comfortably, in no apparent distress. Aware of need for urine sample, will let RN know when able.       Dwain Cunningham RN  10/07/22 [id number]

## 2022-11-04 ENCOUNTER — HOSPITAL ENCOUNTER (OUTPATIENT)
Age: 67
Discharge: HOME | End: 2022-11-04
Payer: MEDICARE

## 2022-11-04 DIAGNOSIS — N39.0 RECURRENT UTI: ICD-10-CM

## 2022-11-04 LAB
BACTERIA: ABNORMAL /HPF
BILIRUBIN URINE: NEGATIVE
BLOOD, URINE: ABNORMAL
CLARITY: ABNORMAL
COLOR: YELLOW
EPITHELIAL CELLS, UA: ABNORMAL /HPF
GLUCOSE URINE: NEGATIVE MG/DL
KETONES, URINE: NEGATIVE MG/DL
LEUKOCYTE ESTERASE, URINE: ABNORMAL
NITRITE, URINE: POSITIVE
PH UA: 7 (ref 5–9)
PROTEIN UA: NEGATIVE MG/DL
RBC UA: ABNORMAL /HPF (ref 0–2)
SPECIFIC GRAVITY UA: <=1.005 (ref 1–1.03)
UROBILINOGEN, URINE: 0.2 E.U./DL
WBC UA: >20 /HPF (ref 0–5)

## 2022-11-04 PROCEDURE — 87088 URINE BACTERIA CULTURE: CPT

## 2022-11-04 PROCEDURE — 81001 URINALYSIS AUTO W/SCOPE: CPT

## 2022-11-05 LAB — URINE CULTURE, ROUTINE: NORMAL

## 2022-11-08 RX ORDER — CEFDINIR 300 MG/1
300 CAPSULE ORAL 2 TIMES DAILY
Qty: 20 CAPSULE | Refills: 0 | Status: SHIPPED | OUTPATIENT
Start: 2022-11-08 | End: 2022-11-18

## 2022-11-08 NOTE — PROGRESS NOTES
CALLED PT  INFORMED OF URINE CX RESULT   ORDERED OMNICEF IF NOT BETTER TO CALL OFFICE  Sharita Boyer MD
No

## 2022-12-15 ENCOUNTER — OFFICE VISIT (OUTPATIENT)
Dept: INTERNAL MEDICINE CLINIC | Age: 67
End: 2022-12-15
Payer: MEDICARE

## 2022-12-15 VITALS
HEART RATE: 88 BPM | BODY MASS INDEX: 35.7 KG/M2 | TEMPERATURE: 96.9 F | HEIGHT: 62 IN | OXYGEN SATURATION: 94 % | DIASTOLIC BLOOD PRESSURE: 80 MMHG | WEIGHT: 194 LBS | SYSTOLIC BLOOD PRESSURE: 136 MMHG

## 2022-12-15 DIAGNOSIS — F31.9 BIPOLAR 1 DISORDER (HCC): ICD-10-CM

## 2022-12-15 DIAGNOSIS — E03.9 ACQUIRED HYPOTHYROIDISM: ICD-10-CM

## 2022-12-15 DIAGNOSIS — N18.32 STAGE 3B CHRONIC KIDNEY DISEASE (HCC): ICD-10-CM

## 2022-12-15 DIAGNOSIS — E78.2 MIXED HYPERLIPIDEMIA: ICD-10-CM

## 2022-12-15 DIAGNOSIS — E66.01 SEVERE OBESITY (BMI 35.0-39.9) WITH COMORBIDITY (HCC): ICD-10-CM

## 2022-12-15 DIAGNOSIS — R73.01 IMPAIRED FASTING GLUCOSE: Primary | ICD-10-CM

## 2022-12-15 DIAGNOSIS — A04.71 RECURRENT COLITIS DUE TO CLOSTRIDIOIDES DIFFICILE: ICD-10-CM

## 2022-12-15 DIAGNOSIS — Z94.0 HISTORY OF RENAL TRANSPLANT: ICD-10-CM

## 2022-12-15 PROBLEM — J44.1 ACUTE EXACERBATION OF CHRONIC OBSTRUCTIVE PULMONARY DISEASE (COPD) (HCC): Status: RESOLVED | Noted: 2022-05-19 | Resolved: 2022-12-15

## 2022-12-15 PROBLEM — J96.01 ACUTE RESPIRATORY FAILURE WITH HYPOXEMIA (HCC): Status: RESOLVED | Noted: 2022-03-11 | Resolved: 2022-12-15

## 2022-12-15 PROBLEM — J96.01 ACUTE RESPIRATORY FAILURE WITH HYPOXIA (HCC): Status: RESOLVED | Noted: 2022-02-25 | Resolved: 2022-12-15

## 2022-12-15 PROBLEM — N17.9 ACUTE RENAL FAILURE (HCC): Status: RESOLVED | Noted: 2022-01-10 | Resolved: 2022-12-15

## 2022-12-15 PROBLEM — N39.0 SEPSIS SECONDARY TO UTI (HCC): Status: RESOLVED | Noted: 2021-11-17 | Resolved: 2022-12-15

## 2022-12-15 PROBLEM — A41.9 SEPSIS SECONDARY TO UTI (HCC): Status: RESOLVED | Noted: 2021-11-17 | Resolved: 2022-12-15

## 2022-12-15 PROBLEM — N17.9 AKI (ACUTE KIDNEY INJURY) (HCC): Status: RESOLVED | Noted: 2022-01-11 | Resolved: 2022-12-15

## 2022-12-15 PROBLEM — R40.20 COMA (HCC): Status: RESOLVED | Noted: 2022-01-21 | Resolved: 2022-12-15

## 2022-12-15 PROCEDURE — 3017F COLORECTAL CA SCREEN DOC REV: CPT | Performed by: INTERNAL MEDICINE

## 2022-12-15 PROCEDURE — 3074F SYST BP LT 130 MM HG: CPT | Performed by: INTERNAL MEDICINE

## 2022-12-15 PROCEDURE — G8399 PT W/DXA RESULTS DOCUMENT: HCPCS | Performed by: INTERNAL MEDICINE

## 2022-12-15 PROCEDURE — 3078F DIAST BP <80 MM HG: CPT | Performed by: INTERNAL MEDICINE

## 2022-12-15 PROCEDURE — 1123F ACP DISCUSS/DSCN MKR DOCD: CPT | Performed by: INTERNAL MEDICINE

## 2022-12-15 PROCEDURE — 1036F TOBACCO NON-USER: CPT | Performed by: INTERNAL MEDICINE

## 2022-12-15 PROCEDURE — G8427 DOCREV CUR MEDS BY ELIG CLIN: HCPCS | Performed by: INTERNAL MEDICINE

## 2022-12-15 PROCEDURE — G8484 FLU IMMUNIZE NO ADMIN: HCPCS | Performed by: INTERNAL MEDICINE

## 2022-12-15 PROCEDURE — 99215 OFFICE O/P EST HI 40 MIN: CPT | Performed by: INTERNAL MEDICINE

## 2022-12-15 PROCEDURE — 1090F PRES/ABSN URINE INCON ASSESS: CPT | Performed by: INTERNAL MEDICINE

## 2022-12-15 PROCEDURE — G8417 CALC BMI ABV UP PARAM F/U: HCPCS | Performed by: INTERNAL MEDICINE

## 2022-12-15 ASSESSMENT — ENCOUNTER SYMPTOMS
ABDOMINAL DISTENTION: 0
BLOOD IN STOOL: 0
SINUS PAIN: 0
SHORTNESS OF BREATH: 0
FACIAL SWELLING: 0
WHEEZING: 0
RHINORRHEA: 0
NAUSEA: 0
ABDOMINAL PAIN: 0
CHEST TIGHTNESS: 0
CONSTIPATION: 0
COUGH: 0
DIARRHEA: 0

## 2022-12-15 NOTE — PROGRESS NOTES
Jarod Treviño (:  1955) is a 78 y.o. female,Established patient, here for evaluation of the following chief complaint(s):  Check-Up  Patient is here for follow-up on her recurrent C. difficile colitis, chronic renal failure,  She has been doing well       ASSESSMENT/PLAN:  1. Impaired fasting glucose  -     Vitamin B12 & Folate; Future  -     Hemoglobin A1C; Future  -     Lipid, Fasting; Future  -     T4, Free; Future  -     TSH; Future  Will check her hemoglobin A1c with the next blood work. Patient has been watching her diet. That she actually lost about 32 pounds since 2022 since she has been hospitalized several times and was very sick with the C. difficile colitis and encephalitis and was in rehab  2. Stage 3b chronic kidney disease (HCC)  -     Vitamin B12 & Folate; Future  -     Hemoglobin A1C; Future  -     Lipid, Fasting; Future  -     T4, Free; Future  -     TSH; Future  Creatinine is back to her baseline at 1.4. She follows with Dr. Lisette Eagle. We will recheck her creatinine by next visit. 3. History of renal transplant  -     Vitamin B12 & Folate; Future  -     Hemoglobin A1C; Future  -     Lipid, Fasting; Future  -     T4, Free; Future  -     TSH; Future  She still follows with her transplant doctor continue her CellCept and cyclosporine  4. Severe obesity (BMI 35.0-39. 9) with comorbidity (HCC)  -     Vitamin B12 & Folate; Future  -     Hemoglobin A1C; Future  -     Lipid, Fasting; Future  -     T4, Free; Future  -     TSH; Future  Obesity but patient actually lost quite a bit of weight maintaining good at this point. I discussed with her to maintain her diet and now watch her weight so she does not and gained more weight and try to lose slowly.   5. 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 difficile  she was treated with Dificid, and then was placed on vancomycin tapering dose now she is on the maintenance dose that she probably cannot stay on long-term vancomycin 125 mg or 3 times a week. Her bowels has been good she denies any diarrhea  6. Mixed hyperlipidemia  Continue simvastatin and check lipid profile decrease fat in diet, patient is trying to be more active  7. Acquired hypothyroidism  Continue Synthroid 50 mcg daily and check her TSH. 8. Bipolar 1 disorder ([address]  She follows with Dr. Flory Villarreal  continue Clozaril and Pristiq she has been stable. 9.  Patient had DVT in the left lower extremity  And patient had IVC filter placed in February 2022 I was out of town at this point and she was started on Eliquis I still have to look at the records and see if she has hypercoagulable state versus paroxysmal atrial fibrillation during her hospitalized position when I was out of town and see if organ to stop the Eliquis or continue lifelong I discussed that with patient and with her cousin Gita who is accompanying her. I discussed with her also if there is any bleeding or dark stools or any issues to let me know right away    She will also see Dr. Domo ivy of the cardiologist in February for follow-up and I will discuss with him after that if she did have any paroxysmal atrial fibrillation that she requires long-term Eliquis for now since you she was in the hospital  Return in about 2 months (around 2/15/2023). Subjective   SUBJECTIVE/OBJECTIVE:  Patient has been feeling much better she stays at home by herself she does all her ADLs and if she even has been taking a shower on her own. She just cannot drive because of cataract in the right eye that she will see her ophthalmologist in February. She just saw Dr. Galeano yesterday for recurrence of C. difficile colitis she will stay long-term on the vancomycin 3 times a week      Review of Systems   Constitutional:  Negative for appetite change, chills, fatigue, fever and unexpected weight change. HENT:  Negative for congestion, facial swelling, mouth sores, rhinorrhea and sinus pain.     Eyes: Negative for visual disturbance. Respiratory:  Negative for cough, chest tightness, shortness of breath and wheezing. Cardiovascular:  Negative for chest pain and leg swelling. Gastrointestinal:  Negative for abdominal distention, abdominal pain, blood in stool, constipation, diarrhea and nausea. Genitourinary:  Negative for difficulty urinating, dysuria, frequency and urgency. Musculoskeletal:  Negative for joint swelling. Skin:  Negative for rash. Neurological:  Negative for dizziness, syncope, weakness, light-headedness and headaches. Psychiatric/Behavioral:  Negative for behavioral problems, confusion and hallucinations.          Objective   /80   Pulse 88   Temp 96.9 °F (36.1 °C) (Temporal)   Ht 5' 2\" (1.575 m)   Wt 194 lb (88 kg)   SpO2 94%   BMI 35.48 kg/m²   CBC with Differential:    Lab Results   Component Value Date/Time    WBC 22.4 10/07/2022 12:45 PM    RBC 3.50 10/07/2022 12:45 PM    HGB 11.9 10/07/2022 12:45 PM    HCT 38.6 10/07/2022 12:45 PM     10/07/2022 12:45 PM    .3 10/07/2022 12:45 PM    MCH 34.0 10/07/2022 12:45 PM    MCHC 30.8 10/07/2022 12:45 PM    RDW 14.9 10/07/2022 12:45 PM    NRBC 0.9 03/22/2022 06:17 AM    METASPCT 1.7 03/22/2022 06:17 AM    LYMPHOPCT 6.0 10/07/2022 12:45 PM    MONOPCT 1.7 10/07/2022 12:45 PM    MYELOPCT 0.9 05/22/2022 06:26 AM    BASOPCT 0.3 10/07/2022 12:45 PM    MONOSABS 0.45 10/07/2022 12:45 PM    LYMPHSABS 1.34 10/07/2022 12:45 PM    EOSABS 0.00 10/07/2022 12:45 PM    BASOSABS 0.00 10/07/2022 12:45 PM     CMP:    Lab Results   Component Value Date/Time     10/07/2022 12:45 PM    K 4.6 10/07/2022 12:45 PM    CL 99 10/07/2022 12:45 PM    CO2 24 10/07/2022 12:45 PM    BUN 25 10/07/2022 12:45 PM    CREATININE 1.7 10/07/2022 12:45 PM    GFRAA 36 10/07/2022 12:45 PM    LABGLOM 30 10/07/2022 12:45 PM    GLUCOSE 94 10/07/2022 12:45 PM    PROT 6.8 10/07/2022 12:45 PM    LABALBU 3.6 10/07/2022 12:45 PM    CALCIUM 10.4 10/07/2022 12:45 PM    BILITOT 0.8 10/07/2022 12:45 PM    ALKPHOS 77 10/07/2022 12:45 PM    AST 23 10/07/2022 12:45 PM    ALT 6 10/07/2022 12:45 PM     HgBA1c:    Lab Results   Component Value Date/Time    LABA1C 5.7 11/17/2021 02:16 PM     Lipid: No results found for: CHOL  No results found for: TRIG  No results found for: HDL  No results found for: LDLCHOLESTEROL, LDLCALC  No results found for: LABVLDL, VLDL  No results found for: CHOLHDLRATIO  TSH:    Lab Results   Component Value Date/Time    TSH 0.101 05/20/2022 08:19 AM     Free T3: No results found for: T3FREE  Free T4:   Lab Results   Component Value Date/Time    T4FREE 1.04 03/10/2022 01:29 PM     Physical Exam  Constitutional:       Appearance: Normal appearance. HENT:      Head: Normocephalic and atraumatic. Mouth/Throat:      Pharynx: Oropharynx is clear. Eyes:      Extraocular Movements: Extraocular movements intact. Pupils: Pupils are equal, round, and reactive to light. Cardiovascular:      Rate and Rhythm: Normal rate and regular rhythm. Pulses: Normal pulses. Heart sounds: Normal heart sounds. No murmur heard. Pulmonary:      Effort: Pulmonary effort is normal.      Breath sounds: Normal breath sounds. Abdominal:      General: There is no distension. Palpations: Abdomen is soft. There is no mass. Tenderness: There is no abdominal tenderness. There is no guarding or rebound. Comments: Distended   Musculoskeletal:         General: No swelling. Cervical back: Normal range of motion and neck supple. Right lower leg: No edema. Left lower leg: No edema. Comments: +1 bilateral   Skin:     General: Skin is warm. Neurological:      General: No focal deficit present. Mental Status: She is alert and oriented to person, place, and time. Mental status is at baseline. An electronic signature was used to authenticate this note.     --Paula Sosa MD

## 2023-01-06 RX ORDER — ALLOPURINOL 100 MG/1
100 TABLET ORAL DAILY
Qty: 90 TABLET | Refills: 1 | Status: SHIPPED | OUTPATIENT
Start: 2023-01-06

## 2023-01-16 ENCOUNTER — TELEPHONE (OUTPATIENT)
Dept: INTERNAL MEDICINE CLINIC | Age: 68
End: 2023-01-16

## 2023-01-16 NOTE — TELEPHONE ENCOUNTER
Last Appointment:  12/15/2022  Future Appointments   Date Time Provider Gabriel Kelley   2/22/2023  9:30 AM Twyla Scott MD [address]   6/14/2023 10:00 AM Saintclair Passe, MD [facility]

## 2023-01-18 ENCOUNTER — HOSPITAL ENCOUNTER (OUTPATIENT)
Age: 68
Discharge: HOME | End: 2023-01-18
Payer: MEDICARE

## 2023-01-18 DIAGNOSIS — N39.0 RECURRENT UTI: ICD-10-CM

## 2023-01-18 LAB
BACTERIA: ABNORMAL /HPF
BILIRUBIN URINE: NEGATIVE
BLOOD, URINE: ABNORMAL
CLARITY: ABNORMAL
COLOR: YELLOW
EPITHELIAL CELLS, UA: ABNORMAL /HPF
GLUCOSE URINE: NEGATIVE MG/DL
KETONES, URINE: NEGATIVE MG/DL
LEUKOCYTE ESTERASE, URINE: ABNORMAL
NITRITE, URINE: NEGATIVE
PH UA: 5.5 (ref 5–9)
PROTEIN UA: 30 MG/DL
RBC UA: ABNORMAL /HPF (ref 0–2)
SPECIFIC GRAVITY UA: 1.02 (ref 1–1.03)
UROBILINOGEN, URINE: 0.2 E.U./DL
WBC UA: >20 /HPF (ref 0–5)

## 2023-01-18 PROCEDURE — 87088 URINE BACTERIA CULTURE: CPT

## 2023-01-18 PROCEDURE — 81001 URINALYSIS AUTO W/SCOPE: CPT

## 2023-01-18 RX ORDER — LEVOTHYROXINE SODIUM 0.05 MG/1
50 TABLET ORAL DAILY
Qty: 90 TABLET | Refills: 1 | Status: SHIPPED | OUTPATIENT
Start: 2023-01-18

## 2023-01-18 NOTE — TELEPHONE ENCOUNTER
Requested Prescriptions     Pending Prescriptions Disp Refills    levothyroxine (SYNTHROID) 50 MCG tablet 90 tablet 2     Sig: Take 1 tablet by mouth daily

## 2023-01-20 DIAGNOSIS — N30.00 ACUTE CYSTITIS WITHOUT HEMATURIA: Primary | ICD-10-CM

## 2023-01-20 DIAGNOSIS — Z94.0 KIDNEY TRANSPLANTED: ICD-10-CM

## 2023-01-20 LAB — URINE CULTURE, ROUTINE: NORMAL

## 2023-01-20 RX ORDER — EPINEPHRINE 1 MG/ML
0.3 INJECTION, SOLUTION, CONCENTRATE INTRAVENOUS PRN
Status: CANCELLED | OUTPATIENT
Start: 2023-01-21

## 2023-01-20 RX ORDER — ONDANSETRON 2 MG/ML
8 INJECTION INTRAMUSCULAR; INTRAVENOUS
Status: CANCELLED | OUTPATIENT
Start: 2023-01-21

## 2023-01-20 RX ORDER — DIPHENHYDRAMINE HYDROCHLORIDE 50 MG/ML
50 INJECTION INTRAMUSCULAR; INTRAVENOUS
Status: CANCELLED | OUTPATIENT
Start: 2023-01-21

## 2023-01-20 RX ORDER — SODIUM CHLORIDE 0.9 % (FLUSH) 0.9 %
5-40 SYRINGE (ML) INJECTION PRN
Status: CANCELLED | OUTPATIENT
Start: 2023-01-21

## 2023-01-20 RX ORDER — ACETAMINOPHEN 325 MG/1
650 TABLET ORAL
Status: CANCELLED | OUTPATIENT
Start: 2023-01-21

## 2023-01-20 RX ORDER — FAMOTIDINE 10 MG/ML
20 INJECTION, SOLUTION INTRAVENOUS
Status: CANCELLED | OUTPATIENT
Start: 2023-01-21

## 2023-01-20 RX ORDER — ALBUTEROL SULFATE 90 UG/1
4 AEROSOL, METERED RESPIRATORY (INHALATION) PRN
Status: CANCELLED | OUTPATIENT
Start: 2023-01-21

## 2023-01-20 RX ORDER — SODIUM CHLORIDE 9 MG/ML
INJECTION, SOLUTION INTRAVENOUS CONTINUOUS
Status: CANCELLED | OUTPATIENT
Start: 2023-01-21

## 2023-01-20 RX ORDER — CEFDINIR 300 MG/1
300 CAPSULE ORAL 2 TIMES DAILY
Qty: 28 CAPSULE | Refills: 0 | Status: SHIPPED | OUTPATIENT
Start: 2023-01-20 | End: 2023-01-21 | Stop reason: ALTCHOICE

## 2023-01-20 RX ORDER — HEPARIN SODIUM (PORCINE) LOCK FLUSH IV SOLN 100 UNIT/ML 100 UNIT/ML
500 SOLUTION INTRAVENOUS PRN
Status: CANCELLED | OUTPATIENT
Start: 2023-01-21

## 2023-01-20 RX ORDER — SODIUM CHLORIDE 9 MG/ML
5-250 INJECTION, SOLUTION INTRAVENOUS PRN
Status: CANCELLED | OUTPATIENT
Start: 2023-01-21

## 2023-01-20 NOTE — PROGRESS NOTES
[facility]    PT HAVING UTI SX/ODOR/BURNING  CX MIXED MARIO COVER ESBL   H/O KIDNEY TRANSPLANT CKD  HAS H/O CDIFF FOLLOW   TO SET UP AT INFUSION [facility]ENTER   OTHERWISE TO START OMNICEF ID UNABLE TO START IV    Orders Placed This Encounter    ertapenem (INVANZ) infusion     Sig: Infuse 500 mg intravenously every 24 hours for 14 days Compound per protocol.      Dispense:  7 g     Refill:  0    cefdinir (OMNICEF) 300 MG capsule     Sig: Take 1 capsule by mouth 2 times daily for 14 days     Dispense:  28 capsule     Refill:  0     Jordan Koo MD  3:59 PM      CALLED PT WITH RESULTS AND  PLAN OF CARE ABOVE  PT IS S/P RX PNEUMONIA WITH AMOXICILLIN AND FLU     Jordan Koo MD  4:12 PM

## 2023-01-21 ENCOUNTER — HOSPITAL ENCOUNTER (OUTPATIENT)
Dept: INFUSION THERAPY | Age: 68
Setting detail: INFUSION SERIES
Discharge: HOME | End: 2023-01-21
Payer: MEDICARE

## 2023-01-21 VITALS
TEMPERATURE: 97.2 F | RESPIRATION RATE: 24 BRPM | OXYGEN SATURATION: 97 % | HEART RATE: 90 BPM | SYSTOLIC BLOOD PRESSURE: 170 MMHG | DIASTOLIC BLOOD PRESSURE: 99 MMHG

## 2023-01-21 DIAGNOSIS — Z94.0 KIDNEY TRANSPLANTED: ICD-10-CM

## 2023-01-21 DIAGNOSIS — N30.00 ACUTE CYSTITIS WITHOUT HEMATURIA: Primary | ICD-10-CM

## 2023-01-21 PROCEDURE — 2580000003 HC RX 258: Performed by: SPECIALIST

## 2023-01-21 PROCEDURE — 6360000002 HC RX W HCPCS: Performed by: SPECIALIST

## 2023-01-21 PROCEDURE — 96365 THER/PROPH/DIAG IV INF INIT: CPT

## 2023-01-21 RX ORDER — CEFDINIR 300 MG/1
300 CAPSULE ORAL 2 TIMES DAILY
COMMUNITY

## 2023-01-21 RX ORDER — SODIUM CHLORIDE 9 MG/ML
5-250 INJECTION, SOLUTION INTRAVENOUS PRN
Status: CANCELLED | OUTPATIENT
Start: 2023-01-22

## 2023-01-21 RX ORDER — ONDANSETRON 2 MG/ML
8 INJECTION INTRAMUSCULAR; INTRAVENOUS
Status: CANCELLED | OUTPATIENT
Start: 2023-01-22

## 2023-01-21 RX ORDER — ALBUTEROL SULFATE 90 UG/1
4 AEROSOL, METERED RESPIRATORY (INHALATION) PRN
Status: CANCELLED | OUTPATIENT
Start: 2023-01-22

## 2023-01-21 RX ORDER — SODIUM CHLORIDE 0.9 % (FLUSH) 0.9 %
5-40 SYRINGE (ML) INJECTION PRN
Status: CANCELLED | OUTPATIENT
Start: 2023-01-22

## 2023-01-21 RX ORDER — SODIUM CHLORIDE 0.9 % (FLUSH) 0.9 %
5-40 SYRINGE (ML) INJECTION PRN
Status: DISCONTINUED | OUTPATIENT
Start: 2023-01-21 | End: 2023-01-22 | Stop reason: HOSPADM

## 2023-01-21 RX ORDER — DIPHENHYDRAMINE HYDROCHLORIDE 50 MG/ML
50 INJECTION INTRAMUSCULAR; INTRAVENOUS
Status: CANCELLED | OUTPATIENT
Start: 2023-01-22

## 2023-01-21 RX ORDER — HEPARIN SODIUM (PORCINE) LOCK FLUSH IV SOLN 100 UNIT/ML 100 UNIT/ML
500 SOLUTION INTRAVENOUS PRN
Status: CANCELLED | OUTPATIENT
Start: 2023-01-22

## 2023-01-21 RX ORDER — ACETAMINOPHEN 325 MG/1
650 TABLET ORAL
Status: CANCELLED | OUTPATIENT
Start: 2023-01-22

## 2023-01-21 RX ORDER — HEPARIN SODIUM (PORCINE) LOCK FLUSH IV SOLN 100 UNIT/ML 100 UNIT/ML
500 SOLUTION INTRAVENOUS PRN
Status: DISCONTINUED | OUTPATIENT
Start: 2023-01-21 | End: 2023-01-22 | Stop reason: HOSPADM

## 2023-01-21 RX ORDER — EPINEPHRINE 1 MG/ML
0.3 INJECTION, SOLUTION, CONCENTRATE INTRAVENOUS PRN
Status: CANCELLED | OUTPATIENT
Start: 2023-01-22

## 2023-01-21 RX ORDER — SODIUM CHLORIDE 9 MG/ML
INJECTION, SOLUTION INTRAVENOUS CONTINUOUS
Status: CANCELLED | OUTPATIENT
Start: 2023-01-22

## 2023-01-21 RX ADMIN — HEPARIN SODIUM (PORCINE) LOCK FLUSH IV SOLN 100 UNIT/ML 500 UNITS: 100 SOLUTION at 09:19

## 2023-01-21 RX ADMIN — ERTAPENEM SODIUM 500 MG: 1 INJECTION, POWDER, LYOPHILIZED, FOR SOLUTION INTRAMUSCULAR; INTRAVENOUS at 08:58

## 2023-01-21 RX ADMIN — Medication 10 ML: at 08:58

## 2023-01-21 RX ADMIN — Medication 10 ML: at 09:19

## 2023-01-22 ENCOUNTER — HOSPITAL ENCOUNTER (OUTPATIENT)
Dept: INFUSION THERAPY | Age: 68
Setting detail: INFUSION SERIES
Discharge: HOME | End: 2023-01-22
Payer: MEDICARE

## 2023-01-22 DIAGNOSIS — Z94.0 KIDNEY TRANSPLANTED: ICD-10-CM

## 2023-01-22 DIAGNOSIS — N30.00 ACUTE CYSTITIS WITHOUT HEMATURIA: Primary | ICD-10-CM

## 2023-01-22 PROCEDURE — 96365 THER/PROPH/DIAG IV INF INIT: CPT

## 2023-01-22 PROCEDURE — 2580000003 HC RX 258: Performed by: SPECIALIST

## 2023-01-22 PROCEDURE — 6360000002 HC RX W HCPCS: Performed by: SPECIALIST

## 2023-01-22 RX ORDER — DIPHENHYDRAMINE HYDROCHLORIDE 50 MG/ML
50 INJECTION INTRAMUSCULAR; INTRAVENOUS
Status: CANCELLED | OUTPATIENT
Start: 2023-01-23

## 2023-01-22 RX ORDER — ONDANSETRON 2 MG/ML
8 INJECTION INTRAMUSCULAR; INTRAVENOUS
Status: CANCELLED | OUTPATIENT
Start: 2023-01-23

## 2023-01-22 RX ORDER — ALBUTEROL SULFATE 90 UG/1
4 AEROSOL, METERED RESPIRATORY (INHALATION) PRN
Status: CANCELLED | OUTPATIENT
Start: 2023-01-23

## 2023-01-22 RX ORDER — SODIUM CHLORIDE 0.9 % (FLUSH) 0.9 %
5-40 SYRINGE (ML) INJECTION PRN
Status: DISCONTINUED | OUTPATIENT
Start: 2023-01-22 | End: 2023-01-23 | Stop reason: HOSPADM

## 2023-01-22 RX ORDER — SODIUM CHLORIDE 9 MG/ML
INJECTION, SOLUTION INTRAVENOUS CONTINUOUS
Status: CANCELLED | OUTPATIENT
Start: 2023-01-23

## 2023-01-22 RX ORDER — SODIUM CHLORIDE 0.9 % (FLUSH) 0.9 %
5-40 SYRINGE (ML) INJECTION PRN
Status: CANCELLED | OUTPATIENT
Start: 2023-01-23

## 2023-01-22 RX ORDER — ACETAMINOPHEN 325 MG/1
650 TABLET ORAL
Status: CANCELLED | OUTPATIENT
Start: 2023-01-23

## 2023-01-22 RX ORDER — HEPARIN SODIUM (PORCINE) LOCK FLUSH IV SOLN 100 UNIT/ML 100 UNIT/ML
500 SOLUTION INTRAVENOUS PRN
Status: CANCELLED | OUTPATIENT
Start: 2023-01-23

## 2023-01-22 RX ORDER — SODIUM CHLORIDE 9 MG/ML
5-250 INJECTION, SOLUTION INTRAVENOUS PRN
Status: CANCELLED | OUTPATIENT
Start: 2023-01-23

## 2023-01-22 RX ORDER — EPINEPHRINE 1 MG/ML
0.3 INJECTION, SOLUTION, CONCENTRATE INTRAVENOUS PRN
Status: CANCELLED | OUTPATIENT
Start: 2023-01-23

## 2023-01-22 RX ORDER — HEPARIN SODIUM (PORCINE) LOCK FLUSH IV SOLN 100 UNIT/ML 100 UNIT/ML
500 SOLUTION INTRAVENOUS PRN
Status: DISCONTINUED | OUTPATIENT
Start: 2023-01-22 | End: 2023-01-23 | Stop reason: HOSPADM

## 2023-01-22 RX ADMIN — Medication 10 ML: at 08:50

## 2023-01-22 RX ADMIN — ERTAPENEM SODIUM 500 MG: 1 INJECTION, POWDER, LYOPHILIZED, FOR SOLUTION INTRAMUSCULAR; INTRAVENOUS at 08:28

## 2023-01-22 RX ADMIN — Medication 10 ML: at 08:28

## 2023-01-22 RX ADMIN — HEPARIN SODIUM (PORCINE) LOCK FLUSH IV SOLN 100 UNIT/ML 500 UNITS: 100 SOLUTION at 08:50

## 2023-01-23 ENCOUNTER — HOSPITAL ENCOUNTER (OUTPATIENT)
Dept: INFUSION THERAPY | Age: 68
Setting detail: INFUSION SERIES
Discharge: HOME | End: 2023-01-23
Payer: MEDICARE

## 2023-01-23 VITALS
DIASTOLIC BLOOD PRESSURE: 71 MMHG | OXYGEN SATURATION: 97 % | RESPIRATION RATE: 20 BRPM | HEART RATE: 84 BPM | SYSTOLIC BLOOD PRESSURE: 132 MMHG | TEMPERATURE: 97.5 F

## 2023-01-23 DIAGNOSIS — N30.00 ACUTE CYSTITIS WITHOUT HEMATURIA: Primary | ICD-10-CM

## 2023-01-23 DIAGNOSIS — Z94.0 KIDNEY TRANSPLANTED: ICD-10-CM

## 2023-01-23 LAB
BASOPHILS ABSOLUTE: 0 E9/L (ref 0–0.2)
BASOPHILS RELATIVE PERCENT: 0.5 % (ref 0–2)
EOSINOPHILS ABSOLUTE: 0 E9/L (ref 0.05–0.5)
EOSINOPHILS RELATIVE PERCENT: 1.6 % (ref 0–6)
HCT VFR BLD CALC: 39.2 % (ref 34–48)
HEMOGLOBIN: 11.7 G/DL (ref 11.5–15.5)
LYMPHOCYTES ABSOLUTE: 0.86 E9/L (ref 1.5–4)
LYMPHOCYTES RELATIVE PERCENT: 9.6 % (ref 20–42)
MCH RBC QN AUTO: 32.9 PG (ref 26–35)
MCHC RBC AUTO-ENTMCNC: 29.8 % (ref 32–34.5)
MCV RBC AUTO: 110.1 FL (ref 80–99.9)
MONOCYTES ABSOLUTE: 0.69 E9/L (ref 0.1–0.95)
MONOCYTES RELATIVE PERCENT: 7.8 % (ref 2–12)
NEUTROPHILS ABSOLUTE: 7.14 E9/L (ref 1.8–7.3)
NEUTROPHILS RELATIVE PERCENT: 82.6 % (ref 43–80)
OVALOCYTES: ABNORMAL
PDW BLD-RTO: 14.7 FL (ref 11.5–15)
PLATELET # BLD: 165 E9/L (ref 130–450)
PMV BLD AUTO: 13.5 FL (ref 7–12)
POIKILOCYTES: ABNORMAL
RBC # BLD: 3.56 E12/L (ref 3.5–5.5)
REASON FOR REJECTION: NORMAL
REJECTED TEST: NORMAL
SEDIMENTATION RATE, ERYTHROCYTE: 38 MM/HR (ref 0–20)
TARGET CELLS: ABNORMAL
WBC # BLD: 8.6 E9/L (ref 4.5–11.5)

## 2023-01-23 PROCEDURE — 6360000002 HC RX W HCPCS: Performed by: SPECIALIST

## 2023-01-23 PROCEDURE — 96365 THER/PROPH/DIAG IV INF INIT: CPT

## 2023-01-23 PROCEDURE — 36415 COLL VENOUS BLD VENIPUNCTURE: CPT

## 2023-01-23 PROCEDURE — 85651 RBC SED RATE NONAUTOMATED: CPT

## 2023-01-23 PROCEDURE — 2580000003 HC RX 258: Performed by: SPECIALIST

## 2023-01-23 PROCEDURE — 85025 COMPLETE CBC W/AUTO DIFF WBC: CPT

## 2023-01-23 RX ORDER — SODIUM CHLORIDE 0.9 % (FLUSH) 0.9 %
5-40 SYRINGE (ML) INJECTION PRN
Status: DISCONTINUED | OUTPATIENT
Start: 2023-01-23 | End: 2023-01-24 | Stop reason: HOSPADM

## 2023-01-23 RX ORDER — HEPARIN SODIUM (PORCINE) LOCK FLUSH IV SOLN 100 UNIT/ML 100 UNIT/ML
500 SOLUTION INTRAVENOUS PRN
Status: CANCELLED | OUTPATIENT
Start: 2023-01-24

## 2023-01-23 RX ORDER — ONDANSETRON 2 MG/ML
8 INJECTION INTRAMUSCULAR; INTRAVENOUS
Status: CANCELLED | OUTPATIENT
Start: 2023-01-24

## 2023-01-23 RX ORDER — ACETAMINOPHEN 325 MG/1
650 TABLET ORAL
Status: CANCELLED | OUTPATIENT
Start: 2023-01-24

## 2023-01-23 RX ORDER — ALBUTEROL SULFATE 90 UG/1
4 AEROSOL, METERED RESPIRATORY (INHALATION) PRN
Status: CANCELLED | OUTPATIENT
Start: 2023-01-24

## 2023-01-23 RX ORDER — SODIUM CHLORIDE 9 MG/ML
5-250 INJECTION, SOLUTION INTRAVENOUS PRN
Status: CANCELLED | OUTPATIENT
Start: 2023-01-24

## 2023-01-23 RX ORDER — HEPARIN SODIUM (PORCINE) LOCK FLUSH IV SOLN 100 UNIT/ML 100 UNIT/ML
500 SOLUTION INTRAVENOUS PRN
Status: DISCONTINUED | OUTPATIENT
Start: 2023-01-23 | End: 2023-01-24 | Stop reason: HOSPADM

## 2023-01-23 RX ORDER — EPINEPHRINE 1 MG/ML
0.3 INJECTION, SOLUTION, CONCENTRATE INTRAVENOUS PRN
Status: CANCELLED | OUTPATIENT
Start: 2023-01-24

## 2023-01-23 RX ORDER — SODIUM CHLORIDE 9 MG/ML
INJECTION, SOLUTION INTRAVENOUS CONTINUOUS
Status: CANCELLED | OUTPATIENT
Start: 2023-01-24

## 2023-01-23 RX ORDER — SODIUM CHLORIDE 0.9 % (FLUSH) 0.9 %
5-40 SYRINGE (ML) INJECTION PRN
Status: CANCELLED | OUTPATIENT
Start: 2023-01-24

## 2023-01-23 RX ORDER — DIPHENHYDRAMINE HYDROCHLORIDE 50 MG/ML
50 INJECTION INTRAMUSCULAR; INTRAVENOUS
Status: CANCELLED | OUTPATIENT
Start: 2023-01-24

## 2023-01-23 RX ADMIN — Medication 10 ML: at 10:16

## 2023-01-23 RX ADMIN — Medication 10 ML: at 09:47

## 2023-01-23 RX ADMIN — ERTAPENEM SODIUM 500 MG: 1 INJECTION, POWDER, LYOPHILIZED, FOR SOLUTION INTRAMUSCULAR; INTRAVENOUS at 09:46

## 2023-01-23 RX ADMIN — HEPARIN SODIUM (PORCINE) LOCK FLUSH IV SOLN 100 UNIT/ML 500 UNITS: 100 SOLUTION at 10:16

## 2023-01-24 ENCOUNTER — HOSPITAL ENCOUNTER (OUTPATIENT)
Dept: INFUSION THERAPY | Age: 68
Setting detail: INFUSION SERIES
Discharge: HOME | End: 2023-01-24
Payer: MEDICARE

## 2023-01-24 VITALS
DIASTOLIC BLOOD PRESSURE: 63 MMHG | OXYGEN SATURATION: 97 % | RESPIRATION RATE: 20 BRPM | HEART RATE: 75 BPM | SYSTOLIC BLOOD PRESSURE: 136 MMHG | TEMPERATURE: 97.9 F

## 2023-01-24 DIAGNOSIS — N30.00 ACUTE CYSTITIS WITHOUT HEMATURIA: Primary | ICD-10-CM

## 2023-01-24 DIAGNOSIS — Z94.0 KIDNEY TRANSPLANTED: ICD-10-CM

## 2023-01-24 LAB
ALBUMIN SERPL-MCNC: 3.9 G/DL (ref 3.5–5.2)
ALP BLD-CCNC: 71 U/L (ref 35–104)
ALT SERPL-CCNC: 8 U/L (ref 0–32)
ANION GAP SERPL CALCULATED.3IONS-SCNC: 11 MMOL/L (ref 7–16)
AST SERPL-CCNC: 15 U/L (ref 0–31)
BILIRUB SERPL-MCNC: 0.2 MG/DL (ref 0–1.2)
BUN BLDV-MCNC: 21 MG/DL (ref 6–23)
C-REACTIVE PROTEIN: 0.8 MG/DL (ref 0–0.4)
CALCIUM SERPL-MCNC: 10.1 MG/DL (ref 8.6–10.2)
CHLORIDE BLD-SCNC: 102 MMOL/L (ref 98–107)
CO2: 26 MMOL/L (ref 22–29)
CREAT SERPL-MCNC: 1.5 MG/DL (ref 0.5–1)
GFR SERPL CREATININE-BSD FRML MDRD: 38 ML/MIN/1.73
GLUCOSE BLD-MCNC: 118 MG/DL (ref 74–99)
POTASSIUM SERPL-SCNC: 4.9 MMOL/L (ref 3.5–5)
SODIUM BLD-SCNC: 139 MMOL/L (ref 132–146)
TOTAL CK: 43 U/L (ref 20–180)
TOTAL PROTEIN: 6.5 G/DL (ref 6.4–8.3)

## 2023-01-24 PROCEDURE — 80053 COMPREHEN METABOLIC PANEL: CPT

## 2023-01-24 PROCEDURE — 82550 ASSAY OF CK (CPK): CPT

## 2023-01-24 PROCEDURE — 36415 COLL VENOUS BLD VENIPUNCTURE: CPT

## 2023-01-24 PROCEDURE — 2580000003 HC RX 258: Performed by: SPECIALIST

## 2023-01-24 PROCEDURE — 96365 THER/PROPH/DIAG IV INF INIT: CPT

## 2023-01-24 PROCEDURE — 6360000002 HC RX W HCPCS: Performed by: SPECIALIST

## 2023-01-24 PROCEDURE — 86140 C-REACTIVE PROTEIN: CPT

## 2023-01-24 RX ORDER — SODIUM CHLORIDE 9 MG/ML
5-250 INJECTION, SOLUTION INTRAVENOUS PRN
Status: CANCELLED | OUTPATIENT
Start: 2023-01-25

## 2023-01-24 RX ORDER — ONDANSETRON 2 MG/ML
8 INJECTION INTRAMUSCULAR; INTRAVENOUS
Status: CANCELLED | OUTPATIENT
Start: 2023-01-25

## 2023-01-24 RX ORDER — SODIUM CHLORIDE 0.9 % (FLUSH) 0.9 %
5-40 SYRINGE (ML) INJECTION PRN
Status: CANCELLED | OUTPATIENT
Start: 2023-01-25

## 2023-01-24 RX ORDER — ACETAMINOPHEN 325 MG/1
650 TABLET ORAL
Status: CANCELLED | OUTPATIENT
Start: 2023-01-25

## 2023-01-24 RX ORDER — HEPARIN SODIUM (PORCINE) LOCK FLUSH IV SOLN 100 UNIT/ML 100 UNIT/ML
500 SOLUTION INTRAVENOUS PRN
Status: DISCONTINUED | OUTPATIENT
Start: 2023-01-24 | End: 2023-01-25 | Stop reason: HOSPADM

## 2023-01-24 RX ORDER — ALBUTEROL SULFATE 90 UG/1
4 AEROSOL, METERED RESPIRATORY (INHALATION) PRN
Status: CANCELLED | OUTPATIENT
Start: 2023-01-25

## 2023-01-24 RX ORDER — SODIUM CHLORIDE 9 MG/ML
INJECTION, SOLUTION INTRAVENOUS CONTINUOUS
Status: CANCELLED | OUTPATIENT
Start: 2023-01-25

## 2023-01-24 RX ORDER — DIPHENHYDRAMINE HYDROCHLORIDE 50 MG/ML
50 INJECTION INTRAMUSCULAR; INTRAVENOUS
Status: CANCELLED | OUTPATIENT
Start: 2023-01-25

## 2023-01-24 RX ORDER — SODIUM CHLORIDE 0.9 % (FLUSH) 0.9 %
5-40 SYRINGE (ML) INJECTION PRN
Status: DISCONTINUED | OUTPATIENT
Start: 2023-01-24 | End: 2023-01-25 | Stop reason: HOSPADM

## 2023-01-24 RX ORDER — HEPARIN SODIUM (PORCINE) LOCK FLUSH IV SOLN 100 UNIT/ML 100 UNIT/ML
500 SOLUTION INTRAVENOUS PRN
Status: CANCELLED | OUTPATIENT
Start: 2023-01-25

## 2023-01-24 RX ORDER — EPINEPHRINE 1 MG/ML
0.3 INJECTION, SOLUTION, CONCENTRATE INTRAVENOUS PRN
Status: CANCELLED | OUTPATIENT
Start: 2023-01-25

## 2023-01-24 RX ADMIN — ERTAPENEM SODIUM 500 MG: 1 INJECTION, POWDER, LYOPHILIZED, FOR SOLUTION INTRAMUSCULAR; INTRAVENOUS at 11:12

## 2023-01-24 RX ADMIN — Medication 10 ML: at 11:33

## 2023-01-24 RX ADMIN — Medication 10 ML: at 11:08

## 2023-01-25 ENCOUNTER — HOSPITAL ENCOUNTER (OUTPATIENT)
Dept: INFUSION THERAPY | Age: 68
Setting detail: INFUSION SERIES
Discharge: HOME | End: 2023-01-25
Payer: MEDICARE

## 2023-01-25 VITALS
TEMPERATURE: 97.1 F | DIASTOLIC BLOOD PRESSURE: 70 MMHG | OXYGEN SATURATION: 95 % | SYSTOLIC BLOOD PRESSURE: 145 MMHG | RESPIRATION RATE: 20 BRPM | HEART RATE: 85 BPM

## 2023-01-25 DIAGNOSIS — N30.00 ACUTE CYSTITIS WITHOUT HEMATURIA: Primary | ICD-10-CM

## 2023-01-25 DIAGNOSIS — Z94.0 KIDNEY TRANSPLANTED: ICD-10-CM

## 2023-01-25 PROCEDURE — 6360000002 HC RX W HCPCS: Performed by: SPECIALIST

## 2023-01-25 PROCEDURE — 96365 THER/PROPH/DIAG IV INF INIT: CPT

## 2023-01-25 PROCEDURE — 2580000003 HC RX 258: Performed by: SPECIALIST

## 2023-01-25 RX ORDER — HEPARIN SODIUM (PORCINE) LOCK FLUSH IV SOLN 100 UNIT/ML 100 UNIT/ML
500 SOLUTION INTRAVENOUS PRN
Status: CANCELLED | OUTPATIENT
Start: 2023-01-26

## 2023-01-25 RX ORDER — ONDANSETRON 2 MG/ML
8 INJECTION INTRAMUSCULAR; INTRAVENOUS
Status: CANCELLED | OUTPATIENT
Start: 2023-01-26

## 2023-01-25 RX ORDER — DIPHENHYDRAMINE HYDROCHLORIDE 50 MG/ML
50 INJECTION INTRAMUSCULAR; INTRAVENOUS
Status: CANCELLED | OUTPATIENT
Start: 2023-01-26

## 2023-01-25 RX ORDER — SODIUM CHLORIDE 9 MG/ML
5-250 INJECTION, SOLUTION INTRAVENOUS PRN
Status: CANCELLED | OUTPATIENT
Start: 2023-01-26

## 2023-01-25 RX ORDER — SODIUM CHLORIDE 0.9 % (FLUSH) 0.9 %
5-40 SYRINGE (ML) INJECTION PRN
Status: DISCONTINUED | OUTPATIENT
Start: 2023-01-25 | End: 2023-01-26 | Stop reason: HOSPADM

## 2023-01-25 RX ORDER — ACETAMINOPHEN 325 MG/1
650 TABLET ORAL
Status: CANCELLED | OUTPATIENT
Start: 2023-01-26

## 2023-01-25 RX ORDER — EPINEPHRINE 1 MG/ML
0.3 INJECTION, SOLUTION, CONCENTRATE INTRAVENOUS PRN
Status: CANCELLED | OUTPATIENT
Start: 2023-01-26

## 2023-01-25 RX ORDER — SODIUM CHLORIDE 0.9 % (FLUSH) 0.9 %
5-40 SYRINGE (ML) INJECTION PRN
Status: CANCELLED | OUTPATIENT
Start: 2023-01-26

## 2023-01-25 RX ORDER — ALBUTEROL SULFATE 90 UG/1
4 AEROSOL, METERED RESPIRATORY (INHALATION) PRN
Status: CANCELLED | OUTPATIENT
Start: 2023-01-26

## 2023-01-25 RX ORDER — SODIUM CHLORIDE 9 MG/ML
INJECTION, SOLUTION INTRAVENOUS CONTINUOUS
Status: CANCELLED | OUTPATIENT
Start: 2023-01-26

## 2023-01-25 RX ORDER — HEPARIN SODIUM (PORCINE) LOCK FLUSH IV SOLN 100 UNIT/ML 100 UNIT/ML
500 SOLUTION INTRAVENOUS PRN
Status: DISCONTINUED | OUTPATIENT
Start: 2023-01-25 | End: 2023-01-26 | Stop reason: HOSPADM

## 2023-01-25 RX ADMIN — Medication 10 ML: at 08:39

## 2023-01-25 RX ADMIN — HEPARIN SODIUM (PORCINE) LOCK FLUSH IV SOLN 100 UNIT/ML 500 UNITS: 100 SOLUTION at 09:08

## 2023-01-25 RX ADMIN — Medication 10 ML: at 09:08

## 2023-01-25 RX ADMIN — ERTAPENEM SODIUM 500 MG: 1 INJECTION, POWDER, LYOPHILIZED, FOR SOLUTION INTRAMUSCULAR; INTRAVENOUS at 08:39

## 2023-01-26 ENCOUNTER — HOSPITAL ENCOUNTER (OUTPATIENT)
Dept: INFUSION THERAPY | Age: 68
Setting detail: INFUSION SERIES
Discharge: HOME | End: 2023-01-26
Payer: MEDICARE

## 2023-01-26 VITALS
OXYGEN SATURATION: 95 % | HEART RATE: 81 BPM | SYSTOLIC BLOOD PRESSURE: 136 MMHG | TEMPERATURE: 97.1 F | DIASTOLIC BLOOD PRESSURE: 68 MMHG | RESPIRATION RATE: 20 BRPM

## 2023-01-26 DIAGNOSIS — Z94.0 KIDNEY TRANSPLANTED: ICD-10-CM

## 2023-01-26 DIAGNOSIS — N30.00 ACUTE CYSTITIS WITHOUT HEMATURIA: Primary | ICD-10-CM

## 2023-01-26 LAB
ALBUMIN SERPL-MCNC: 4 G/DL (ref 3.5–5.2)
ALP BLD-CCNC: 73 U/L (ref 35–104)
ALT SERPL-CCNC: 8 U/L (ref 0–32)
ANION GAP SERPL CALCULATED.3IONS-SCNC: 10 MMOL/L (ref 7–16)
AST SERPL-CCNC: 11 U/L (ref 0–31)
BASOPHILS ABSOLUTE: 0 E9/L (ref 0–0.2)
BASOPHILS RELATIVE PERCENT: 0.3 % (ref 0–2)
BILIRUB SERPL-MCNC: 0.3 MG/DL (ref 0–1.2)
BUN BLDV-MCNC: 21 MG/DL (ref 6–23)
CALCIUM SERPL-MCNC: 10.5 MG/DL (ref 8.6–10.2)
CHLORIDE BLD-SCNC: 103 MMOL/L (ref 98–107)
CO2: 25 MMOL/L (ref 22–29)
CREAT SERPL-MCNC: 1.5 MG/DL (ref 0.5–1)
EOSINOPHILS ABSOLUTE: 0 E9/L (ref 0.05–0.5)
EOSINOPHILS RELATIVE PERCENT: 0.8 % (ref 0–6)
GFR SERPL CREATININE-BSD FRML MDRD: 38 ML/MIN/1.73
GLUCOSE BLD-MCNC: 144 MG/DL (ref 74–99)
HCT VFR BLD CALC: 40.6 % (ref 34–48)
HEMOGLOBIN: 11.9 G/DL (ref 11.5–15.5)
LYMPHOCYTES ABSOLUTE: 1.14 E9/L (ref 1.5–4)
LYMPHOCYTES RELATIVE PERCENT: 13 % (ref 20–42)
MCH RBC QN AUTO: 32.4 PG (ref 26–35)
MCHC RBC AUTO-ENTMCNC: 29.3 % (ref 32–34.5)
MCV RBC AUTO: 110.6 FL (ref 80–99.9)
MONOCYTES ABSOLUTE: 0.53 E9/L (ref 0.1–0.95)
MONOCYTES RELATIVE PERCENT: 6.1 % (ref 2–12)
NEUTROPHILS ABSOLUTE: 7.13 E9/L (ref 1.8–7.3)
NEUTROPHILS RELATIVE PERCENT: 80.9 % (ref 43–80)
PDW BLD-RTO: 14.6 FL (ref 11.5–15)
PLATELET # BLD: 164 E9/L (ref 130–450)
PMV BLD AUTO: 13.1 FL (ref 7–12)
POTASSIUM SERPL-SCNC: 4.7 MMOL/L (ref 3.5–5)
RBC # BLD: 3.67 E12/L (ref 3.5–5.5)
SODIUM BLD-SCNC: 138 MMOL/L (ref 132–146)
TOTAL PROTEIN: 6.3 G/DL (ref 6.4–8.3)
WBC # BLD: 8.8 E9/L (ref 4.5–11.5)

## 2023-01-26 PROCEDURE — 36415 COLL VENOUS BLD VENIPUNCTURE: CPT

## 2023-01-26 PROCEDURE — 96365 THER/PROPH/DIAG IV INF INIT: CPT

## 2023-01-26 PROCEDURE — 6360000002 HC RX W HCPCS: Performed by: SPECIALIST

## 2023-01-26 PROCEDURE — 85025 COMPLETE CBC W/AUTO DIFF WBC: CPT

## 2023-01-26 PROCEDURE — 80053 COMPREHEN METABOLIC PANEL: CPT

## 2023-01-26 PROCEDURE — 2580000003 HC RX 258: Performed by: SPECIALIST

## 2023-01-26 RX ORDER — SODIUM CHLORIDE 9 MG/ML
5-250 INJECTION, SOLUTION INTRAVENOUS PRN
Status: CANCELLED | OUTPATIENT
Start: 2023-01-27

## 2023-01-26 RX ORDER — DIPHENHYDRAMINE HYDROCHLORIDE 50 MG/ML
50 INJECTION INTRAMUSCULAR; INTRAVENOUS
Status: CANCELLED | OUTPATIENT
Start: 2023-01-27

## 2023-01-26 RX ORDER — ALBUTEROL SULFATE 90 UG/1
4 AEROSOL, METERED RESPIRATORY (INHALATION) PRN
Status: CANCELLED | OUTPATIENT
Start: 2023-01-27

## 2023-01-26 RX ORDER — SODIUM CHLORIDE 9 MG/ML
INJECTION, SOLUTION INTRAVENOUS CONTINUOUS
Status: CANCELLED | OUTPATIENT
Start: 2023-01-27

## 2023-01-26 RX ORDER — ONDANSETRON 2 MG/ML
8 INJECTION INTRAMUSCULAR; INTRAVENOUS
Status: CANCELLED | OUTPATIENT
Start: 2023-01-27

## 2023-01-26 RX ORDER — ACETAMINOPHEN 325 MG/1
650 TABLET ORAL
Status: CANCELLED | OUTPATIENT
Start: 2023-01-27

## 2023-01-26 RX ORDER — SODIUM CHLORIDE 0.9 % (FLUSH) 0.9 %
5-40 SYRINGE (ML) INJECTION PRN
Status: CANCELLED | OUTPATIENT
Start: 2023-01-27

## 2023-01-26 RX ORDER — EPINEPHRINE 1 MG/ML
0.3 INJECTION, SOLUTION, CONCENTRATE INTRAVENOUS PRN
Status: CANCELLED | OUTPATIENT
Start: 2023-01-27

## 2023-01-26 RX ORDER — HEPARIN SODIUM (PORCINE) LOCK FLUSH IV SOLN 100 UNIT/ML 100 UNIT/ML
500 SOLUTION INTRAVENOUS PRN
Status: DISCONTINUED | OUTPATIENT
Start: 2023-01-26 | End: 2023-01-27 | Stop reason: HOSPADM

## 2023-01-26 RX ORDER — HEPARIN SODIUM (PORCINE) LOCK FLUSH IV SOLN 100 UNIT/ML 100 UNIT/ML
500 SOLUTION INTRAVENOUS PRN
Status: CANCELLED | OUTPATIENT
Start: 2023-01-27

## 2023-01-26 RX ORDER — SODIUM CHLORIDE 0.9 % (FLUSH) 0.9 %
5-40 SYRINGE (ML) INJECTION PRN
Status: DISCONTINUED | OUTPATIENT
Start: 2023-01-26 | End: 2023-01-27 | Stop reason: HOSPADM

## 2023-01-26 RX ADMIN — SODIUM CHLORIDE 500 MG: 9 INJECTION, SOLUTION INTRAVENOUS at 11:16

## 2023-01-26 RX ADMIN — HEPARIN SODIUM (PORCINE) LOCK FLUSH IV SOLN 100 UNIT/ML 500 UNITS: 100 SOLUTION at 11:42

## 2023-01-26 RX ADMIN — Medication 10 ML: at 11:16

## 2023-01-26 RX ADMIN — Medication 10 ML: at 11:42

## 2023-01-27 ENCOUNTER — HOSPITAL ENCOUNTER (OUTPATIENT)
Dept: INFUSION THERAPY | Age: 68
Setting detail: INFUSION SERIES
Discharge: HOME | End: 2023-01-27
Payer: MEDICARE

## 2023-01-27 VITALS
SYSTOLIC BLOOD PRESSURE: 152 MMHG | OXYGEN SATURATION: 96 % | DIASTOLIC BLOOD PRESSURE: 72 MMHG | RESPIRATION RATE: 18 BRPM | HEART RATE: 89 BPM | TEMPERATURE: 98 F

## 2023-01-27 DIAGNOSIS — N30.00 ACUTE CYSTITIS WITHOUT HEMATURIA: Primary | ICD-10-CM

## 2023-01-27 DIAGNOSIS — Z94.0 KIDNEY TRANSPLANTED: ICD-10-CM

## 2023-01-27 PROCEDURE — 96365 THER/PROPH/DIAG IV INF INIT: CPT

## 2023-01-27 PROCEDURE — 6360000002 HC RX W HCPCS: Performed by: SPECIALIST

## 2023-01-27 PROCEDURE — 2580000003 HC RX 258: Performed by: SPECIALIST

## 2023-01-27 RX ORDER — ALBUTEROL SULFATE 90 UG/1
4 AEROSOL, METERED RESPIRATORY (INHALATION) PRN
Status: CANCELLED | OUTPATIENT
Start: 2023-01-28

## 2023-01-27 RX ORDER — SODIUM CHLORIDE 0.9 % (FLUSH) 0.9 %
5-40 SYRINGE (ML) INJECTION PRN
Status: CANCELLED | OUTPATIENT
Start: 2023-01-28

## 2023-01-27 RX ORDER — ACETAMINOPHEN 325 MG/1
650 TABLET ORAL
Status: CANCELLED | OUTPATIENT
Start: 2023-01-28

## 2023-01-27 RX ORDER — SODIUM CHLORIDE 9 MG/ML
5-250 INJECTION, SOLUTION INTRAVENOUS PRN
Status: CANCELLED | OUTPATIENT
Start: 2023-01-28

## 2023-01-27 RX ORDER — HEPARIN SODIUM (PORCINE) LOCK FLUSH IV SOLN 100 UNIT/ML 100 UNIT/ML
500 SOLUTION INTRAVENOUS PRN
Status: DISCONTINUED | OUTPATIENT
Start: 2023-01-27 | End: 2023-01-28 | Stop reason: HOSPADM

## 2023-01-27 RX ORDER — SODIUM CHLORIDE 0.9 % (FLUSH) 0.9 %
5-40 SYRINGE (ML) INJECTION PRN
Status: DISCONTINUED | OUTPATIENT
Start: 2023-01-27 | End: 2023-01-28 | Stop reason: HOSPADM

## 2023-01-27 RX ORDER — ONDANSETRON 2 MG/ML
8 INJECTION INTRAMUSCULAR; INTRAVENOUS
Status: CANCELLED | OUTPATIENT
Start: 2023-01-28

## 2023-01-27 RX ORDER — SODIUM CHLORIDE 9 MG/ML
INJECTION, SOLUTION INTRAVENOUS CONTINUOUS
Status: CANCELLED | OUTPATIENT
Start: 2023-01-28

## 2023-01-27 RX ORDER — EPINEPHRINE 1 MG/ML
0.3 INJECTION, SOLUTION, CONCENTRATE INTRAVENOUS PRN
Status: CANCELLED | OUTPATIENT
Start: 2023-01-28

## 2023-01-27 RX ORDER — HEPARIN SODIUM (PORCINE) LOCK FLUSH IV SOLN 100 UNIT/ML 100 UNIT/ML
500 SOLUTION INTRAVENOUS PRN
Status: CANCELLED | OUTPATIENT
Start: 2023-01-28

## 2023-01-27 RX ORDER — DIPHENHYDRAMINE HYDROCHLORIDE 50 MG/ML
50 INJECTION INTRAMUSCULAR; INTRAVENOUS
Status: CANCELLED | OUTPATIENT
Start: 2023-01-28

## 2023-01-27 RX ADMIN — HEPARIN 500 UNITS: 100 SYRINGE at 09:26

## 2023-01-27 RX ADMIN — ERTAPENEM SODIUM 500 MG: 1 INJECTION, POWDER, LYOPHILIZED, FOR SOLUTION INTRAMUSCULAR; INTRAVENOUS at 08:56

## 2023-01-27 RX ADMIN — SODIUM CHLORIDE, PRESERVATIVE FREE 10 ML: 5 INJECTION INTRAVENOUS at 09:26

## 2023-01-27 RX ADMIN — SODIUM CHLORIDE, PRESERVATIVE FREE 10 ML: 5 INJECTION INTRAVENOUS at 09:00

## 2023-01-28 ENCOUNTER — HOSPITAL ENCOUNTER (OUTPATIENT)
Dept: INFUSION THERAPY | Age: 68
Setting detail: INFUSION SERIES
Discharge: HOME | End: 2023-01-28
Payer: MEDICARE

## 2023-01-28 VITALS
SYSTOLIC BLOOD PRESSURE: 130 MMHG | HEART RATE: 80 BPM | TEMPERATURE: 97.3 F | RESPIRATION RATE: 18 BRPM | DIASTOLIC BLOOD PRESSURE: 73 MMHG | OXYGEN SATURATION: 96 %

## 2023-01-28 DIAGNOSIS — Z94.0 KIDNEY TRANSPLANTED: ICD-10-CM

## 2023-01-28 DIAGNOSIS — N30.00 ACUTE CYSTITIS WITHOUT HEMATURIA: Primary | ICD-10-CM

## 2023-01-28 PROCEDURE — 6360000002 HC RX W HCPCS: Performed by: SPECIALIST

## 2023-01-28 PROCEDURE — 2580000003 HC RX 258: Performed by: SPECIALIST

## 2023-01-28 PROCEDURE — 96365 THER/PROPH/DIAG IV INF INIT: CPT

## 2023-01-28 RX ORDER — HEPARIN SODIUM (PORCINE) LOCK FLUSH IV SOLN 100 UNIT/ML 100 UNIT/ML
500 SOLUTION INTRAVENOUS PRN
Status: CANCELLED | OUTPATIENT
Start: 2023-01-29

## 2023-01-28 RX ORDER — SODIUM CHLORIDE 9 MG/ML
5-250 INJECTION, SOLUTION INTRAVENOUS PRN
Status: CANCELLED | OUTPATIENT
Start: 2023-01-29

## 2023-01-28 RX ORDER — SODIUM CHLORIDE 0.9 % (FLUSH) 0.9 %
5-40 SYRINGE (ML) INJECTION PRN
Status: CANCELLED | OUTPATIENT
Start: 2023-01-29

## 2023-01-28 RX ORDER — DIPHENHYDRAMINE HYDROCHLORIDE 50 MG/ML
50 INJECTION INTRAMUSCULAR; INTRAVENOUS
Status: CANCELLED | OUTPATIENT
Start: 2023-01-29

## 2023-01-28 RX ORDER — ALBUTEROL SULFATE 90 UG/1
4 AEROSOL, METERED RESPIRATORY (INHALATION) PRN
Status: CANCELLED | OUTPATIENT
Start: 2023-01-29

## 2023-01-28 RX ORDER — SODIUM CHLORIDE 9 MG/ML
INJECTION, SOLUTION INTRAVENOUS CONTINUOUS
Status: CANCELLED | OUTPATIENT
Start: 2023-01-29

## 2023-01-28 RX ORDER — ACETAMINOPHEN 325 MG/1
650 TABLET ORAL
Status: CANCELLED | OUTPATIENT
Start: 2023-01-29

## 2023-01-28 RX ORDER — ONDANSETRON 2 MG/ML
8 INJECTION INTRAMUSCULAR; INTRAVENOUS
Status: CANCELLED | OUTPATIENT
Start: 2023-01-29

## 2023-01-28 RX ORDER — SODIUM CHLORIDE 0.9 % (FLUSH) 0.9 %
5-40 SYRINGE (ML) INJECTION PRN
Status: DISCONTINUED | OUTPATIENT
Start: 2023-01-28 | End: 2023-01-29 | Stop reason: HOSPADM

## 2023-01-28 RX ORDER — HEPARIN SODIUM (PORCINE) LOCK FLUSH IV SOLN 100 UNIT/ML 100 UNIT/ML
500 SOLUTION INTRAVENOUS PRN
Status: DISCONTINUED | OUTPATIENT
Start: 2023-01-28 | End: 2023-01-29 | Stop reason: HOSPADM

## 2023-01-28 RX ORDER — EPINEPHRINE 1 MG/ML
0.3 INJECTION, SOLUTION, CONCENTRATE INTRAVENOUS PRN
Status: CANCELLED | OUTPATIENT
Start: 2023-01-29

## 2023-01-28 RX ADMIN — HEPARIN SODIUM (PORCINE) LOCK FLUSH IV SOLN 100 UNIT/ML 500 UNITS: 100 SOLUTION at 09:00

## 2023-01-28 RX ADMIN — Medication 10 ML: at 08:39

## 2023-01-28 RX ADMIN — ERTAPENEM SODIUM 500 MG: 1 INJECTION, POWDER, LYOPHILIZED, FOR SOLUTION INTRAMUSCULAR; INTRAVENOUS at 08:40

## 2023-01-28 RX ADMIN — Medication 10 ML: at 09:00

## 2023-01-29 ENCOUNTER — HOSPITAL ENCOUNTER (OUTPATIENT)
Dept: INFUSION THERAPY | Age: 68
Setting detail: INFUSION SERIES
Discharge: HOME | End: 2023-01-29
Payer: MEDICARE

## 2023-01-29 VITALS
HEART RATE: 79 BPM | TEMPERATURE: 98.2 F | SYSTOLIC BLOOD PRESSURE: 142 MMHG | RESPIRATION RATE: 18 BRPM | OXYGEN SATURATION: 96 % | DIASTOLIC BLOOD PRESSURE: 79 MMHG

## 2023-01-29 DIAGNOSIS — N30.00 ACUTE CYSTITIS WITHOUT HEMATURIA: Primary | ICD-10-CM

## 2023-01-29 DIAGNOSIS — Z94.0 KIDNEY TRANSPLANTED: ICD-10-CM

## 2023-01-29 PROCEDURE — 96365 THER/PROPH/DIAG IV INF INIT: CPT

## 2023-01-29 PROCEDURE — 6360000002 HC RX W HCPCS: Performed by: SPECIALIST

## 2023-01-29 PROCEDURE — 2580000003 HC RX 258: Performed by: SPECIALIST

## 2023-01-29 RX ORDER — DIPHENHYDRAMINE HYDROCHLORIDE 50 MG/ML
50 INJECTION INTRAMUSCULAR; INTRAVENOUS
Status: CANCELLED | OUTPATIENT
Start: 2023-01-30

## 2023-01-29 RX ORDER — SODIUM CHLORIDE 0.9 % (FLUSH) 0.9 %
5-40 SYRINGE (ML) INJECTION PRN
Status: DISCONTINUED | OUTPATIENT
Start: 2023-01-29 | End: 2023-01-30 | Stop reason: HOSPADM

## 2023-01-29 RX ORDER — HEPARIN SODIUM (PORCINE) LOCK FLUSH IV SOLN 100 UNIT/ML 100 UNIT/ML
500 SOLUTION INTRAVENOUS PRN
Status: CANCELLED | OUTPATIENT
Start: 2023-01-30

## 2023-01-29 RX ORDER — SODIUM CHLORIDE 0.9 % (FLUSH) 0.9 %
5-40 SYRINGE (ML) INJECTION PRN
Status: CANCELLED | OUTPATIENT
Start: 2023-01-30

## 2023-01-29 RX ORDER — HEPARIN SODIUM (PORCINE) LOCK FLUSH IV SOLN 100 UNIT/ML 100 UNIT/ML
500 SOLUTION INTRAVENOUS PRN
Status: DISCONTINUED | OUTPATIENT
Start: 2023-01-29 | End: 2023-01-30 | Stop reason: HOSPADM

## 2023-01-29 RX ORDER — SODIUM CHLORIDE 9 MG/ML
5-250 INJECTION, SOLUTION INTRAVENOUS PRN
Status: CANCELLED | OUTPATIENT
Start: 2023-01-30

## 2023-01-29 RX ORDER — SODIUM CHLORIDE 9 MG/ML
INJECTION, SOLUTION INTRAVENOUS CONTINUOUS
Status: CANCELLED | OUTPATIENT
Start: 2023-01-30

## 2023-01-29 RX ORDER — EPINEPHRINE 1 MG/ML
0.3 INJECTION, SOLUTION, CONCENTRATE INTRAVENOUS PRN
Status: CANCELLED | OUTPATIENT
Start: 2023-01-30

## 2023-01-29 RX ORDER — ACETAMINOPHEN 325 MG/1
650 TABLET ORAL
Status: CANCELLED | OUTPATIENT
Start: 2023-01-30

## 2023-01-29 RX ORDER — ONDANSETRON 2 MG/ML
8 INJECTION INTRAMUSCULAR; INTRAVENOUS
Status: CANCELLED | OUTPATIENT
Start: 2023-01-30

## 2023-01-29 RX ORDER — ALBUTEROL SULFATE 90 UG/1
4 AEROSOL, METERED RESPIRATORY (INHALATION) PRN
Status: CANCELLED | OUTPATIENT
Start: 2023-01-30

## 2023-01-29 RX ADMIN — ERTAPENEM SODIUM 500 MG: 1 INJECTION, POWDER, LYOPHILIZED, FOR SOLUTION INTRAMUSCULAR; INTRAVENOUS at 08:29

## 2023-01-29 RX ADMIN — Medication 10 ML: at 08:51

## 2023-01-29 RX ADMIN — Medication 10 ML: at 08:29

## 2023-01-30 ENCOUNTER — HOSPITAL ENCOUNTER (OUTPATIENT)
Dept: INFUSION THERAPY | Age: 68
Setting detail: INFUSION SERIES
Discharge: HOME | End: 2023-01-30
Payer: MEDICARE

## 2023-01-30 DIAGNOSIS — N30.00 ACUTE CYSTITIS WITHOUT HEMATURIA: Primary | ICD-10-CM

## 2023-01-30 DIAGNOSIS — Z94.0 KIDNEY TRANSPLANTED: ICD-10-CM

## 2023-01-30 LAB
ALBUMIN SERPL-MCNC: 3.8 G/DL (ref 3.5–5.2)
ALP BLD-CCNC: 72 U/L (ref 35–104)
ALT SERPL-CCNC: 14 U/L (ref 0–32)
ANION GAP SERPL CALCULATED.3IONS-SCNC: 11 MMOL/L (ref 7–16)
AST SERPL-CCNC: 17 U/L (ref 0–31)
BASOPHILS ABSOLUTE: 0 E9/L (ref 0–0.2)
BASOPHILS RELATIVE PERCENT: 0.4 % (ref 0–2)
BILIRUB SERPL-MCNC: 0.3 MG/DL (ref 0–1.2)
BUN BLDV-MCNC: 27 MG/DL (ref 6–23)
C-REACTIVE PROTEIN: <0.3 MG/DL (ref 0–0.4)
CALCIUM SERPL-MCNC: 10.2 MG/DL (ref 8.6–10.2)
CHLORIDE BLD-SCNC: 103 MMOL/L (ref 98–107)
CO2: 26 MMOL/L (ref 22–29)
CREAT SERPL-MCNC: 1.5 MG/DL (ref 0.5–1)
EOSINOPHILS ABSOLUTE: 0.15 E9/L (ref 0.05–0.5)
EOSINOPHILS RELATIVE PERCENT: 1.7 % (ref 0–6)
GFR SERPL CREATININE-BSD FRML MDRD: 38 ML/MIN/1.73
GLUCOSE BLD-MCNC: 119 MG/DL (ref 74–99)
HCT VFR BLD CALC: 40.2 % (ref 34–48)
HEMOGLOBIN: 11.9 G/DL (ref 11.5–15.5)
LYMPHOCYTES ABSOLUTE: 0.81 E9/L (ref 1.5–4)
LYMPHOCYTES RELATIVE PERCENT: 8.6 % (ref 20–42)
MCH RBC QN AUTO: 32.9 PG (ref 26–35)
MCHC RBC AUTO-ENTMCNC: 29.6 % (ref 32–34.5)
MCV RBC AUTO: 111 FL (ref 80–99.9)
MONOCYTES ABSOLUTE: 0.36 E9/L (ref 0.1–0.95)
MONOCYTES RELATIVE PERCENT: 4.3 % (ref 2–12)
NEUTROPHILS ABSOLUTE: 7.65 E9/L (ref 1.8–7.3)
NEUTROPHILS RELATIVE PERCENT: 85.3 % (ref 43–80)
OVALOCYTES: ABNORMAL
PDW BLD-RTO: 14.6 FL (ref 11.5–15)
PLATELET # BLD: 163 E9/L (ref 130–450)
PMV BLD AUTO: 13 FL (ref 7–12)
POIKILOCYTES: ABNORMAL
POLYCHROMASIA: ABNORMAL
POTASSIUM SERPL-SCNC: 4.6 MMOL/L (ref 3.5–5)
RBC # BLD: 3.62 E12/L (ref 3.5–5.5)
SEDIMENTATION RATE, ERYTHROCYTE: 32 MM/HR (ref 0–20)
SODIUM BLD-SCNC: 140 MMOL/L (ref 132–146)
TOTAL PROTEIN: 6.4 G/DL (ref 6.4–8.3)
WBC # BLD: 9 E9/L (ref 4.5–11.5)

## 2023-01-30 PROCEDURE — 36415 COLL VENOUS BLD VENIPUNCTURE: CPT

## 2023-01-30 PROCEDURE — 2580000003 HC RX 258: Performed by: SPECIALIST

## 2023-01-30 PROCEDURE — 85025 COMPLETE CBC W/AUTO DIFF WBC: CPT

## 2023-01-30 PROCEDURE — 6360000002 HC RX W HCPCS: Performed by: SPECIALIST

## 2023-01-30 PROCEDURE — 86140 C-REACTIVE PROTEIN: CPT

## 2023-01-30 PROCEDURE — 96365 THER/PROPH/DIAG IV INF INIT: CPT

## 2023-01-30 PROCEDURE — 80053 COMPREHEN METABOLIC PANEL: CPT

## 2023-01-30 PROCEDURE — 85651 RBC SED RATE NONAUTOMATED: CPT

## 2023-01-30 RX ORDER — SODIUM CHLORIDE 9 MG/ML
5-250 INJECTION, SOLUTION INTRAVENOUS PRN
Status: CANCELLED | OUTPATIENT
Start: 2023-01-31

## 2023-01-30 RX ORDER — ALBUTEROL SULFATE 90 UG/1
4 AEROSOL, METERED RESPIRATORY (INHALATION) PRN
Status: CANCELLED | OUTPATIENT
Start: 2023-01-31

## 2023-01-30 RX ORDER — ONDANSETRON 2 MG/ML
8 INJECTION INTRAMUSCULAR; INTRAVENOUS
Status: CANCELLED | OUTPATIENT
Start: 2023-01-31

## 2023-01-30 RX ORDER — ACETAMINOPHEN 325 MG/1
650 TABLET ORAL
Status: CANCELLED | OUTPATIENT
Start: 2023-01-31

## 2023-01-30 RX ORDER — SODIUM CHLORIDE 0.9 % (FLUSH) 0.9 %
5-40 SYRINGE (ML) INJECTION PRN
Status: CANCELLED | OUTPATIENT
Start: 2023-01-31

## 2023-01-30 RX ORDER — SODIUM CHLORIDE 0.9 % (FLUSH) 0.9 %
5-40 SYRINGE (ML) INJECTION PRN
Status: DISCONTINUED | OUTPATIENT
Start: 2023-01-30 | End: 2023-01-31 | Stop reason: HOSPADM

## 2023-01-30 RX ORDER — EPINEPHRINE 1 MG/ML
0.3 INJECTION, SOLUTION, CONCENTRATE INTRAVENOUS PRN
Status: CANCELLED | OUTPATIENT
Start: 2023-01-31

## 2023-01-30 RX ORDER — DIPHENHYDRAMINE HYDROCHLORIDE 50 MG/ML
50 INJECTION INTRAMUSCULAR; INTRAVENOUS
Status: CANCELLED | OUTPATIENT
Start: 2023-01-31

## 2023-01-30 RX ORDER — SODIUM CHLORIDE 9 MG/ML
INJECTION, SOLUTION INTRAVENOUS CONTINUOUS
Status: CANCELLED | OUTPATIENT
Start: 2023-01-31

## 2023-01-30 RX ORDER — HEPARIN SODIUM (PORCINE) LOCK FLUSH IV SOLN 100 UNIT/ML 100 UNIT/ML
500 SOLUTION INTRAVENOUS PRN
Status: CANCELLED | OUTPATIENT
Start: 2023-01-31

## 2023-01-30 RX ORDER — HEPARIN SODIUM (PORCINE) LOCK FLUSH IV SOLN 100 UNIT/ML 100 UNIT/ML
500 SOLUTION INTRAVENOUS PRN
Status: DISCONTINUED | OUTPATIENT
Start: 2023-01-30 | End: 2023-01-31 | Stop reason: HOSPADM

## 2023-01-30 RX ADMIN — Medication 10 ML: at 09:12

## 2023-01-30 RX ADMIN — ERTAPENEM SODIUM 500 MG: 1 INJECTION, POWDER, LYOPHILIZED, FOR SOLUTION INTRAMUSCULAR; INTRAVENOUS at 09:13

## 2023-01-30 RX ADMIN — HEPARIN SODIUM (PORCINE) LOCK FLUSH IV SOLN 100 UNIT/ML 500 UNITS: 100 SOLUTION at 09:34

## 2023-01-30 RX ADMIN — Medication 10 ML: at 09:34

## 2023-01-30 NOTE — TELEPHONE ENCOUNTER
Last Appointment:  12/15/2022  Future Appointments   Date Time Provider Gabriel Kelley   1/30/2023 11:00 AM [address]   1/31/2023  2:00 PM [address]   2/1/2023 11:15 AM Pio Israel MD [facility]   2/1/2023 11:30 AM [facility] ROOM 2 [facility]  Patric Robles   2/2/2023 12:00 PM [facility] IN[facility] CLINIC ROOM 2 [address] [facility]   2/3/2023  9:00 AM [facility]F CLINIC ROOM 2 [address] [place] [address]   2/4/2023  9:00 AM [address]   2/22/2023  9:30 AM [facility]   6/14/2023 10:00 AM Isac White MD [facility]

## 2023-01-31 ENCOUNTER — HOSPITAL ENCOUNTER (OUTPATIENT)
Dept: INFUSION THERAPY | Age: 68
Setting detail: INFUSION SERIES
Discharge: HOME | End: 2023-01-31
Payer: MEDICARE

## 2023-01-31 VITALS
OXYGEN SATURATION: 97 % | RESPIRATION RATE: 22 BRPM | SYSTOLIC BLOOD PRESSURE: 167 MMHG | TEMPERATURE: 97 F | HEART RATE: 80 BPM | DIASTOLIC BLOOD PRESSURE: 78 MMHG

## 2023-01-31 DIAGNOSIS — Z94.0 KIDNEY TRANSPLANTED: ICD-10-CM

## 2023-01-31 DIAGNOSIS — N30.00 ACUTE CYSTITIS WITHOUT HEMATURIA: Primary | ICD-10-CM

## 2023-01-31 PROCEDURE — 6360000002 HC RX W HCPCS: Performed by: SPECIALIST

## 2023-01-31 PROCEDURE — 96365 THER/PROPH/DIAG IV INF INIT: CPT

## 2023-01-31 PROCEDURE — 2580000003 HC RX 258: Performed by: SPECIALIST

## 2023-01-31 RX ORDER — EPINEPHRINE 1 MG/ML
0.3 INJECTION, SOLUTION, CONCENTRATE INTRAVENOUS PRN
Status: CANCELLED | OUTPATIENT
Start: 2023-02-01

## 2023-01-31 RX ORDER — ALBUTEROL SULFATE 90 UG/1
4 AEROSOL, METERED RESPIRATORY (INHALATION) PRN
Status: CANCELLED | OUTPATIENT
Start: 2023-02-01

## 2023-01-31 RX ORDER — ONDANSETRON 2 MG/ML
8 INJECTION INTRAMUSCULAR; INTRAVENOUS
Status: CANCELLED | OUTPATIENT
Start: 2023-02-01

## 2023-01-31 RX ORDER — ACETAMINOPHEN 325 MG/1
650 TABLET ORAL
Status: CANCELLED | OUTPATIENT
Start: 2023-02-01

## 2023-01-31 RX ORDER — DIPHENHYDRAMINE HYDROCHLORIDE 50 MG/ML
50 INJECTION INTRAMUSCULAR; INTRAVENOUS
Status: CANCELLED | OUTPATIENT
Start: 2023-02-01

## 2023-01-31 RX ORDER — HEPARIN SODIUM (PORCINE) LOCK FLUSH IV SOLN 100 UNIT/ML 100 UNIT/ML
500 SOLUTION INTRAVENOUS PRN
Status: DISCONTINUED | OUTPATIENT
Start: 2023-01-31 | End: 2023-02-01 | Stop reason: HOSPADM

## 2023-01-31 RX ORDER — SODIUM CHLORIDE 0.9 % (FLUSH) 0.9 %
5-40 SYRINGE (ML) INJECTION PRN
Status: CANCELLED | OUTPATIENT
Start: 2023-02-01

## 2023-01-31 RX ORDER — SODIUM CHLORIDE 9 MG/ML
INJECTION, SOLUTION INTRAVENOUS CONTINUOUS
Status: CANCELLED | OUTPATIENT
Start: 2023-02-01

## 2023-01-31 RX ORDER — SODIUM CHLORIDE 9 MG/ML
5-250 INJECTION, SOLUTION INTRAVENOUS PRN
Status: CANCELLED | OUTPATIENT
Start: 2023-02-01

## 2023-01-31 RX ORDER — SODIUM CHLORIDE 0.9 % (FLUSH) 0.9 %
5-40 SYRINGE (ML) INJECTION PRN
Status: DISCONTINUED | OUTPATIENT
Start: 2023-01-31 | End: 2023-02-01 | Stop reason: HOSPADM

## 2023-01-31 RX ORDER — HEPARIN SODIUM (PORCINE) LOCK FLUSH IV SOLN 100 UNIT/ML 100 UNIT/ML
500 SOLUTION INTRAVENOUS PRN
Status: CANCELLED | OUTPATIENT
Start: 2023-02-01

## 2023-01-31 RX ADMIN — ERTAPENEM SODIUM 500 MG: 1 INJECTION, POWDER, LYOPHILIZED, FOR SOLUTION INTRAMUSCULAR; INTRAVENOUS at 08:50

## 2023-01-31 RX ADMIN — Medication 10 ML: at 08:50

## 2023-01-31 RX ADMIN — HEPARIN SODIUM (PORCINE) LOCK FLUSH IV SOLN 100 UNIT/ML 500 UNITS: 100 SOLUTION at 09:10

## 2023-01-31 RX ADMIN — Medication 10 ML: at 09:10

## 2023-02-01 ENCOUNTER — HOSPITAL ENCOUNTER (OUTPATIENT)
Dept: INFUSION THERAPY | Age: 68
Setting detail: INFUSION SERIES
Discharge: HOME | End: 2023-02-01
Payer: MEDICARE

## 2023-02-01 VITALS
SYSTOLIC BLOOD PRESSURE: 161 MMHG | RESPIRATION RATE: 24 BRPM | OXYGEN SATURATION: 97 % | DIASTOLIC BLOOD PRESSURE: 76 MMHG | HEART RATE: 75 BPM | TEMPERATURE: 97 F

## 2023-02-01 DIAGNOSIS — Z94.0 KIDNEY TRANSPLANTED: ICD-10-CM

## 2023-02-01 DIAGNOSIS — N30.00 ACUTE CYSTITIS WITHOUT HEMATURIA: Primary | ICD-10-CM

## 2023-02-01 PROCEDURE — 2580000003 HC RX 258: Performed by: SPECIALIST

## 2023-02-01 PROCEDURE — 96365 THER/PROPH/DIAG IV INF INIT: CPT

## 2023-02-01 PROCEDURE — 6360000002 HC RX W HCPCS: Performed by: SPECIALIST

## 2023-02-01 RX ORDER — SODIUM CHLORIDE 9 MG/ML
5-250 INJECTION, SOLUTION INTRAVENOUS PRN
Status: CANCELLED | OUTPATIENT
Start: 2023-02-02

## 2023-02-01 RX ORDER — EPINEPHRINE 1 MG/ML
0.3 INJECTION, SOLUTION, CONCENTRATE INTRAVENOUS PRN
Status: CANCELLED | OUTPATIENT
Start: 2023-02-02

## 2023-02-01 RX ORDER — DIPHENHYDRAMINE HYDROCHLORIDE 50 MG/ML
50 INJECTION INTRAMUSCULAR; INTRAVENOUS
Status: CANCELLED | OUTPATIENT
Start: 2023-02-02

## 2023-02-01 RX ORDER — HEPARIN SODIUM (PORCINE) LOCK FLUSH IV SOLN 100 UNIT/ML 100 UNIT/ML
500 SOLUTION INTRAVENOUS PRN
Status: DISCONTINUED | OUTPATIENT
Start: 2023-02-01 | End: 2023-02-02 | Stop reason: HOSPADM

## 2023-02-01 RX ORDER — SODIUM CHLORIDE 0.9 % (FLUSH) 0.9 %
5-40 SYRINGE (ML) INJECTION PRN
Status: DISCONTINUED | OUTPATIENT
Start: 2023-02-01 | End: 2023-02-02 | Stop reason: HOSPADM

## 2023-02-01 RX ORDER — SODIUM CHLORIDE 9 MG/ML
INJECTION, SOLUTION INTRAVENOUS CONTINUOUS
Status: CANCELLED | OUTPATIENT
Start: 2023-02-02

## 2023-02-01 RX ORDER — SODIUM CHLORIDE 0.9 % (FLUSH) 0.9 %
5-40 SYRINGE (ML) INJECTION PRN
Status: CANCELLED | OUTPATIENT
Start: 2023-02-02

## 2023-02-01 RX ORDER — HEPARIN SODIUM (PORCINE) LOCK FLUSH IV SOLN 100 UNIT/ML 100 UNIT/ML
500 SOLUTION INTRAVENOUS PRN
Status: CANCELLED | OUTPATIENT
Start: 2023-02-02

## 2023-02-01 RX ORDER — ALBUTEROL SULFATE 90 UG/1
4 AEROSOL, METERED RESPIRATORY (INHALATION) PRN
Status: CANCELLED | OUTPATIENT
Start: 2023-02-02

## 2023-02-01 RX ORDER — ACETAMINOPHEN 325 MG/1
650 TABLET ORAL
Status: CANCELLED | OUTPATIENT
Start: 2023-02-02

## 2023-02-01 RX ORDER — ONDANSETRON 2 MG/ML
8 INJECTION INTRAMUSCULAR; INTRAVENOUS
Status: CANCELLED | OUTPATIENT
Start: 2023-02-02

## 2023-02-01 RX ADMIN — HEPARIN SODIUM (PORCINE) LOCK FLUSH IV SOLN 100 UNIT/ML 500 UNITS: 100 SOLUTION at 11:01

## 2023-02-01 RX ADMIN — ERTAPENEM SODIUM 500 MG: 1 INJECTION, POWDER, LYOPHILIZED, FOR SOLUTION INTRAMUSCULAR; INTRAVENOUS at 10:43

## 2023-02-01 RX ADMIN — Medication 10 ML: at 11:01

## 2023-02-01 RX ADMIN — Medication 10 ML: at 10:45

## 2023-02-02 ENCOUNTER — HOSPITAL ENCOUNTER (OUTPATIENT)
Dept: INFUSION THERAPY | Age: 68
Setting detail: INFUSION SERIES
Discharge: HOME | End: 2023-02-02
Payer: MEDICARE

## 2023-02-02 DIAGNOSIS — N30.00 ACUTE CYSTITIS WITHOUT HEMATURIA: Primary | ICD-10-CM

## 2023-02-02 DIAGNOSIS — Z94.0 KIDNEY TRANSPLANTED: ICD-10-CM

## 2023-02-02 LAB
ALBUMIN SERPL-MCNC: 3.9 G/DL (ref 3.5–5.2)
ALP BLD-CCNC: 69 U/L (ref 35–104)
ALT SERPL-CCNC: 9 U/L (ref 0–32)
ANION GAP SERPL CALCULATED.3IONS-SCNC: 9 MMOL/L (ref 7–16)
ANISOCYTOSIS: ABNORMAL
AST SERPL-CCNC: 14 U/L (ref 0–31)
BASOPHILS ABSOLUTE: 0 E9/L (ref 0–0.2)
BASOPHILS RELATIVE PERCENT: 0.4 % (ref 0–2)
BILIRUB SERPL-MCNC: 0.3 MG/DL (ref 0–1.2)
BUN BLDV-MCNC: 24 MG/DL (ref 6–23)
CALCIUM SERPL-MCNC: 9.9 MG/DL (ref 8.6–10.2)
CHLORIDE BLD-SCNC: 104 MMOL/L (ref 98–107)
CO2: 27 MMOL/L (ref 22–29)
CREAT SERPL-MCNC: 1.4 MG/DL (ref 0.5–1)
EOSINOPHILS ABSOLUTE: 0 E9/L (ref 0.05–0.5)
EOSINOPHILS RELATIVE PERCENT: 1 % (ref 0–6)
GFR SERPL CREATININE-BSD FRML MDRD: 41 ML/MIN/1.73
GLUCOSE BLD-MCNC: 92 MG/DL (ref 74–99)
HCT VFR BLD CALC: 38.9 % (ref 34–48)
HEMOGLOBIN: 11.5 G/DL (ref 11.5–15.5)
LYMPHOCYTES ABSOLUTE: 0.68 E9/L (ref 1.5–4)
LYMPHOCYTES RELATIVE PERCENT: 7 % (ref 20–42)
MCH RBC QN AUTO: 33 PG (ref 26–35)
MCHC RBC AUTO-ENTMCNC: 29.6 % (ref 32–34.5)
MCV RBC AUTO: 111.5 FL (ref 80–99.9)
MONOCYTES ABSOLUTE: 0.39 E9/L (ref 0.1–0.95)
MONOCYTES RELATIVE PERCENT: 3.5 % (ref 2–12)
NEUTROPHILS ABSOLUTE: 8.73 E9/L (ref 1.8–7.3)
NEUTROPHILS RELATIVE PERCENT: 89.5 % (ref 43–80)
PDW BLD-RTO: 14.8 FL (ref 11.5–15)
PLATELET # BLD: 144 E9/L (ref 130–450)
PMV BLD AUTO: 12.9 FL (ref 7–12)
POTASSIUM SERPL-SCNC: 4.5 MMOL/L (ref 3.5–5)
RBC # BLD: 3.49 E12/L (ref 3.5–5.5)
SODIUM BLD-SCNC: 140 MMOL/L (ref 132–146)
TOTAL PROTEIN: 6.2 G/DL (ref 6.4–8.3)
WBC # BLD: 9.7 E9/L (ref 4.5–11.5)

## 2023-02-02 PROCEDURE — 36415 COLL VENOUS BLD VENIPUNCTURE: CPT

## 2023-02-02 PROCEDURE — 2580000003 HC RX 258: Performed by: SPECIALIST

## 2023-02-02 PROCEDURE — 96365 THER/PROPH/DIAG IV INF INIT: CPT

## 2023-02-02 PROCEDURE — 85025 COMPLETE CBC W/AUTO DIFF WBC: CPT

## 2023-02-02 PROCEDURE — 99211 OFF/OP EST MAY X REQ PHY/QHP: CPT

## 2023-02-02 PROCEDURE — 6360000002 HC RX W HCPCS: Performed by: SPECIALIST

## 2023-02-02 PROCEDURE — 80053 COMPREHEN METABOLIC PANEL: CPT

## 2023-02-02 RX ORDER — ONDANSETRON 2 MG/ML
8 INJECTION INTRAMUSCULAR; INTRAVENOUS
Status: CANCELLED | OUTPATIENT
Start: 2023-02-03

## 2023-02-02 RX ORDER — EPINEPHRINE 1 MG/ML
0.3 INJECTION, SOLUTION, CONCENTRATE INTRAVENOUS PRN
Status: CANCELLED | OUTPATIENT
Start: 2023-02-03

## 2023-02-02 RX ORDER — ALBUTEROL SULFATE 90 UG/1
4 AEROSOL, METERED RESPIRATORY (INHALATION) PRN
Status: CANCELLED | OUTPATIENT
Start: 2023-02-03

## 2023-02-02 RX ORDER — HEPARIN SODIUM (PORCINE) LOCK FLUSH IV SOLN 100 UNIT/ML 100 UNIT/ML
500 SOLUTION INTRAVENOUS PRN
Status: CANCELLED | OUTPATIENT
Start: 2023-02-03

## 2023-02-02 RX ORDER — DIPHENHYDRAMINE HYDROCHLORIDE 50 MG/ML
50 INJECTION INTRAMUSCULAR; INTRAVENOUS
Status: CANCELLED | OUTPATIENT
Start: 2023-02-03

## 2023-02-02 RX ORDER — SODIUM CHLORIDE 9 MG/ML
INJECTION, SOLUTION INTRAVENOUS CONTINUOUS
Status: CANCELLED | OUTPATIENT
Start: 2023-02-03

## 2023-02-02 RX ORDER — SODIUM CHLORIDE 0.9 % (FLUSH) 0.9 %
5-40 SYRINGE (ML) INJECTION PRN
Status: CANCELLED | OUTPATIENT
Start: 2023-02-03

## 2023-02-02 RX ORDER — ACETAMINOPHEN 325 MG/1
650 TABLET ORAL
Status: CANCELLED | OUTPATIENT
Start: 2023-02-03

## 2023-02-02 RX ORDER — SODIUM CHLORIDE 9 MG/ML
5-250 INJECTION, SOLUTION INTRAVENOUS PRN
Status: CANCELLED | OUTPATIENT
Start: 2023-02-03

## 2023-02-02 RX ORDER — HEPARIN SODIUM (PORCINE) LOCK FLUSH IV SOLN 100 UNIT/ML 100 UNIT/ML
500 SOLUTION INTRAVENOUS PRN
Status: DISCONTINUED | OUTPATIENT
Start: 2023-02-02 | End: 2023-02-03 | Stop reason: HOSPADM

## 2023-02-02 RX ORDER — SODIUM CHLORIDE 0.9 % (FLUSH) 0.9 %
5-40 SYRINGE (ML) INJECTION PRN
Status: DISCONTINUED | OUTPATIENT
Start: 2023-02-02 | End: 2023-02-03 | Stop reason: HOSPADM

## 2023-02-02 RX ADMIN — Medication 10 ML: at 10:55

## 2023-02-02 RX ADMIN — ERTAPENEM SODIUM 500 MG: 1 INJECTION, POWDER, LYOPHILIZED, FOR SOLUTION INTRAMUSCULAR; INTRAVENOUS at 10:37

## 2023-02-02 RX ADMIN — Medication 10 ML: at 10:23

## 2023-02-02 RX ADMIN — HEPARIN SODIUM (PORCINE) LOCK FLUSH IV SOLN 100 UNIT/ML 500 UNITS: 100 SOLUTION at 10:55

## 2023-02-03 ENCOUNTER — HOSPITAL ENCOUNTER (OUTPATIENT)
Dept: INFUSION THERAPY | Age: 68
Setting detail: INFUSION SERIES
Discharge: HOME | End: 2023-02-03
Payer: MEDICARE

## 2023-02-03 VITALS
RESPIRATION RATE: 22 BRPM | HEART RATE: 74 BPM | DIASTOLIC BLOOD PRESSURE: 78 MMHG | TEMPERATURE: 98 F | SYSTOLIC BLOOD PRESSURE: 148 MMHG | OXYGEN SATURATION: 97 %

## 2023-02-03 DIAGNOSIS — Z94.0 KIDNEY TRANSPLANTED: ICD-10-CM

## 2023-02-03 DIAGNOSIS — N30.00 ACUTE CYSTITIS WITHOUT HEMATURIA: Primary | ICD-10-CM

## 2023-02-03 PROCEDURE — 96365 THER/PROPH/DIAG IV INF INIT: CPT

## 2023-02-03 PROCEDURE — 2580000003 HC RX 258: Performed by: SPECIALIST

## 2023-02-03 PROCEDURE — 6360000002 HC RX W HCPCS: Performed by: SPECIALIST

## 2023-02-03 RX ORDER — SODIUM CHLORIDE 0.9 % (FLUSH) 0.9 %
5-40 SYRINGE (ML) INJECTION PRN
Status: DISCONTINUED | OUTPATIENT
Start: 2023-02-03 | End: 2023-02-04 | Stop reason: HOSPADM

## 2023-02-03 RX ORDER — SODIUM CHLORIDE 0.9 % (FLUSH) 0.9 %
5-40 SYRINGE (ML) INJECTION PRN
Status: CANCELLED | OUTPATIENT
Start: 2023-02-04

## 2023-02-03 RX ORDER — DIPHENHYDRAMINE HYDROCHLORIDE 50 MG/ML
50 INJECTION INTRAMUSCULAR; INTRAVENOUS
Status: CANCELLED | OUTPATIENT
Start: 2023-02-04

## 2023-02-03 RX ORDER — ALBUTEROL SULFATE 90 UG/1
4 AEROSOL, METERED RESPIRATORY (INHALATION) PRN
Status: CANCELLED | OUTPATIENT
Start: 2023-02-04

## 2023-02-03 RX ORDER — ONDANSETRON 2 MG/ML
8 INJECTION INTRAMUSCULAR; INTRAVENOUS
Status: CANCELLED | OUTPATIENT
Start: 2023-02-04

## 2023-02-03 RX ORDER — SODIUM CHLORIDE 9 MG/ML
5-250 INJECTION, SOLUTION INTRAVENOUS PRN
Status: CANCELLED | OUTPATIENT
Start: 2023-02-04

## 2023-02-03 RX ORDER — EPINEPHRINE 1 MG/ML
0.3 INJECTION, SOLUTION, CONCENTRATE INTRAVENOUS PRN
Status: CANCELLED | OUTPATIENT
Start: 2023-02-04

## 2023-02-03 RX ORDER — ACETAMINOPHEN 325 MG/1
650 TABLET ORAL
Status: CANCELLED | OUTPATIENT
Start: 2023-02-04

## 2023-02-03 RX ORDER — HEPARIN SODIUM (PORCINE) LOCK FLUSH IV SOLN 100 UNIT/ML 100 UNIT/ML
500 SOLUTION INTRAVENOUS PRN
Status: DISCONTINUED | OUTPATIENT
Start: 2023-02-03 | End: 2023-02-04 | Stop reason: HOSPADM

## 2023-02-03 RX ORDER — SODIUM CHLORIDE 9 MG/ML
INJECTION, SOLUTION INTRAVENOUS CONTINUOUS
Status: CANCELLED | OUTPATIENT
Start: 2023-02-04

## 2023-02-03 RX ORDER — HEPARIN SODIUM (PORCINE) LOCK FLUSH IV SOLN 100 UNIT/ML 100 UNIT/ML
500 SOLUTION INTRAVENOUS PRN
Status: CANCELLED | OUTPATIENT
Start: 2023-02-04

## 2023-02-03 RX ADMIN — ERTAPENEM SODIUM 500 MG: 1 INJECTION, POWDER, LYOPHILIZED, FOR SOLUTION INTRAMUSCULAR; INTRAVENOUS at 09:42

## 2023-02-03 RX ADMIN — Medication 10 ML: at 09:40

## 2023-02-03 RX ADMIN — HEPARIN SODIUM (PORCINE) LOCK FLUSH IV SOLN 100 UNIT/ML 500 UNITS: 100 SOLUTION at 10:01

## 2023-02-03 RX ADMIN — Medication 10 ML: at 10:01

## 2023-02-04 ENCOUNTER — HOSPITAL ENCOUNTER (OUTPATIENT)
Dept: INFUSION THERAPY | Age: 68
Setting detail: INFUSION SERIES
Discharge: HOME | End: 2023-02-04
Payer: MEDICARE

## 2023-02-04 VITALS
RESPIRATION RATE: 20 BRPM | SYSTOLIC BLOOD PRESSURE: 123 MMHG | HEART RATE: 80 BPM | DIASTOLIC BLOOD PRESSURE: 80 MMHG | OXYGEN SATURATION: 96 %

## 2023-02-04 DIAGNOSIS — N30.00 ACUTE CYSTITIS WITHOUT HEMATURIA: Primary | ICD-10-CM

## 2023-02-04 DIAGNOSIS — Z94.0 KIDNEY TRANSPLANTED: ICD-10-CM

## 2023-02-04 PROCEDURE — 2580000003 HC RX 258: Performed by: SPECIALIST

## 2023-02-04 PROCEDURE — 6360000002 HC RX W HCPCS: Performed by: SPECIALIST

## 2023-02-04 PROCEDURE — 96365 THER/PROPH/DIAG IV INF INIT: CPT

## 2023-02-04 RX ORDER — SODIUM CHLORIDE 0.9 % (FLUSH) 0.9 %
5-40 SYRINGE (ML) INJECTION PRN
Status: DISCONTINUED | OUTPATIENT
Start: 2023-02-04 | End: 2023-02-05 | Stop reason: HOSPADM

## 2023-02-04 RX ORDER — ALBUTEROL SULFATE 90 UG/1
4 AEROSOL, METERED RESPIRATORY (INHALATION) PRN
OUTPATIENT
Start: 2023-02-05

## 2023-02-04 RX ORDER — EPINEPHRINE 1 MG/ML
0.3 INJECTION, SOLUTION, CONCENTRATE INTRAVENOUS PRN
OUTPATIENT
Start: 2023-02-05

## 2023-02-04 RX ORDER — SODIUM CHLORIDE 9 MG/ML
5-250 INJECTION, SOLUTION INTRAVENOUS PRN
OUTPATIENT
Start: 2023-02-05

## 2023-02-04 RX ORDER — SODIUM CHLORIDE 9 MG/ML
INJECTION, SOLUTION INTRAVENOUS CONTINUOUS
OUTPATIENT
Start: 2023-02-05

## 2023-02-04 RX ORDER — SODIUM CHLORIDE 0.9 % (FLUSH) 0.9 %
5-40 SYRINGE (ML) INJECTION PRN
OUTPATIENT
Start: 2023-02-05

## 2023-02-04 RX ORDER — HEPARIN SODIUM (PORCINE) LOCK FLUSH IV SOLN 100 UNIT/ML 100 UNIT/ML
500 SOLUTION INTRAVENOUS PRN
OUTPATIENT
Start: 2023-02-05

## 2023-02-04 RX ORDER — DIPHENHYDRAMINE HYDROCHLORIDE 50 MG/ML
50 INJECTION INTRAMUSCULAR; INTRAVENOUS
OUTPATIENT
Start: 2023-02-05

## 2023-02-04 RX ORDER — ACETAMINOPHEN 325 MG/1
650 TABLET ORAL
OUTPATIENT
Start: 2023-02-05

## 2023-02-04 RX ORDER — ONDANSETRON 2 MG/ML
8 INJECTION INTRAMUSCULAR; INTRAVENOUS
OUTPATIENT
Start: 2023-02-05

## 2023-02-04 RX ADMIN — Medication 10 ML: at 09:47

## 2023-02-04 RX ADMIN — ERTAPENEM SODIUM 500 MG: 1 INJECTION, POWDER, LYOPHILIZED, FOR SOLUTION INTRAMUSCULAR; INTRAVENOUS at 09:46

## 2023-02-04 RX ADMIN — Medication 10 ML: at 10:16

## 2023-02-04 NOTE — DISCHARGE INSTRUCTIONS
ertapenem  Pronunciation:  er ta PEN em  Brand: Daisy Lyons  What is the most important information I should know about ertapenem? Use only as directed. Tell your doctor if you use other medicines or have other medical conditions or allergies. What is ertapenem? Ertapenem is an antibiotic that is used to treat severe infections caused by bacteria in the skin, lungs, stomach, pelvis, and urinary tract. Ertapenem is also used to prevent infection in people having certain types of surgery. Ertapenem may also be used for purposes not listed in this medication guide. What should I discuss with my healthcare provider before using ertapenem? You should not use this medicine if you are allergic to ertapenem or to certain antibiotics, especially:  meropenem;  imipenem;  any type of cephalosporin antibiotic (cefdinir, cephalexin, Keflex, Omnicef, and others); or  penicillin or similar antibiotics (amoxicillin, Augmentin, ampicillin, dicloxacillin, oxacillin, and others). You should not inject ertapenem into a muscle if you are allergic to a numbing medicine such as lidocaine or Novocain. Tell your doctor if you have ever had:  kidney disease;  epilepsy or other seizure disorder; or  a head injury or brain tumor. Tell your doctor if you are pregnant or breastfeeding. Not for children younger than 1 months old. How should I use ertapenem? Follow all directions on your prescription label and read all medication guides or instruction sheets. Use the medicine exactly as directed. Ertapenem is injected into a muscle or a vein. Read and follow all Instructions for Use. Ask your doctor or pharmacist if you need help. Prepare an injection only when you are ready to give it. Call your pharmacist if the medicine looks cloudy, has changed colors, or has particles in it. Be sure you understand how to properly mix this medicine with a liquid (diluent) and how to store the mixture.  Use only the diluent recommended by your doctor or pharmacist.  [place] the mixture well just before you measure a dose. Keep using this medicine even if your symptoms quickly improve. Skipping doses could make your infection resistant to medication. Ertapenem will not treat a viral infection (flu or a common cold). Do not share this medicine with another person, even if they have the same symptoms you have. Store unmixed powder at room temperature away from moisture and heat. Ertapenem that is mixed for injection into a muscle should be used within 1 hour after mixing. Ertapenem that is mixed and diluted for injection into a vein  may be stored for up to 6 hours at room temperature, or up to 24 hours in a refrigerator. Use the mixture within 4 hours after removing from a refrigerator. Do not reuse a needle or syringe. Place them in a puncture-proof \"sharps\" container and dispose of it following state or local laws. Keep out of the reach of children and pets. What happens if I miss a dose? Call your doctor for instructions if you miss a dose. What happens if I overdose? Seek emergency medical attention or call the Poison Help line at 1-513.178.2446. What should I avoid while using ertapenem? Avoid taking anti-diarrhea medicine without first asking your doctor. Diarrhea may be a sign of a new infection. What are the possible side effects of ertapenem? Get emergency medical help if you have signs of an allergic reaction: hives; difficulty breathing; swelling of your face, lips, tongue, or throat. Call your doctor at once if you have:  a seizure (convulsions); or  severe stomach pain, diarrhea that is watery or bloody (even if it occurs months after your last dose). Common side effects include:  nausea, vomiting;  diarrhea;  headache; or  pain or redness where the injection was given. This is not a complete list of side effects and others may occur. Call your doctor for medical advice about side effects.  You may report side effects to FDA at 1-800-FDA-1088. What other drugs will affect ertapenem? Tell your doctor about all your other medicines, especially:  divalproex;  valproic acid; or  probenecid. This list is not complete. Other drugs may affect ertapenem, including prescription and over-the-counter medicines, vitamins, and herbal products. Not all possible drug interactions are listed here. Where can I get more information? Your doctor or pharmacist can provide more information about ertapenem. Remember, keep this and all other medicines out of the reach of children, never share your medicines with others, and use this medication only for the indication prescribed. Every effort has been made to ensure that the information provided by [address] [place], [place] [address]can [address] is accurate, up-to-date, and complete, but no guarantee is made to that effect. Drug information contained herein may be time sensitive. [facility] information has been compiled for use by healthcare practitioners and consumers in the [place] and therefore [facility] does not warrant that uses outside of the [place] are appropriate, unless specifically indicated otherwise. [facility]'s drug information does not endorse drugs, diagnose patients or recommend therapy. [facility] drug information is an informational resource designed to assist licensed healthcare practitioners in caring for their patients and/or to serve consumers viewing this service as a supplement to, and not a substitute for, the expertise, skill, knowledge and judgment of healthcare practitioners. The absence of a warning for a given drug or drug combination in no way should be construed to indicate that the drug or drug combination is safe, effective or appropriate for any given patient. [facility] does not assume any responsibility for any aspect of healthcare administered with the aid of information [facility] provides.  The information contained herein is not intended to cover all possible uses, directions, precautions, warnings, drug interactions, allergic reactions, or adverse effects. If you have questions about the drugs you are taking, check with your doctor, nurse or pharmacist.  Copyright 2191-5676 [address] [place]. Version: 4.01. Revision date: 6/28/2021. Care instructions adapted under license by [facility] ([facility]). If you have questions about a medical condition or this instruction, always ask your healthcare professional. [place] [address] any warranty or liability for your use of this information.

## 2023-02-22 ENCOUNTER — OFFICE VISIT (OUTPATIENT)
Dept: INTERNAL MEDICINE CLINIC | Age: 68
End: 2023-02-22
Payer: MEDICARE

## 2023-02-22 VITALS
HEIGHT: 62 IN | TEMPERATURE: 97.9 F | DIASTOLIC BLOOD PRESSURE: 80 MMHG | SYSTOLIC BLOOD PRESSURE: 140 MMHG | OXYGEN SATURATION: 91 % | HEART RATE: 89 BPM | WEIGHT: 200.6 LBS | BODY MASS INDEX: 36.91 KG/M2

## 2023-02-22 DIAGNOSIS — E66.01 SEVERE OBESITY (BMI 35.0-39.9) WITH COMORBIDITY (HCC): Primary | ICD-10-CM

## 2023-02-22 DIAGNOSIS — J45.41 MODERATE PERSISTENT ASTHMATIC BRONCHITIS WITH ACUTE EXACERBATION: ICD-10-CM

## 2023-02-22 DIAGNOSIS — Z94.0 KIDNEY TRANSPLANTED: ICD-10-CM

## 2023-02-22 DIAGNOSIS — E78.2 MIXED HYPERLIPIDEMIA: ICD-10-CM

## 2023-02-22 DIAGNOSIS — A04.71 RECURRENT COLITIS DUE TO CLOSTRIDIOIDES DIFFICILE: ICD-10-CM

## 2023-02-22 DIAGNOSIS — R73.01 IMPAIRED FASTING GLUCOSE: ICD-10-CM

## 2023-02-22 DIAGNOSIS — E03.9 ACQUIRED HYPOTHYROIDISM: ICD-10-CM

## 2023-02-22 DIAGNOSIS — I10 PRIMARY HYPERTENSION: ICD-10-CM

## 2023-02-22 DIAGNOSIS — F31.9 BIPOLAR 1 DISORDER (HCC): ICD-10-CM

## 2023-02-22 DIAGNOSIS — M81.0 AGE-RELATED OSTEOPOROSIS WITHOUT CURRENT PATHOLOGICAL FRACTURE: ICD-10-CM

## 2023-02-22 DIAGNOSIS — N18.32 STAGE 3B CHRONIC KIDNEY DISEASE (HCC): ICD-10-CM

## 2023-02-22 PROCEDURE — G8484 FLU IMMUNIZE NO ADMIN: HCPCS | Performed by: INTERNAL MEDICINE

## 2023-02-22 PROCEDURE — G8427 DOCREV CUR MEDS BY ELIG CLIN: HCPCS | Performed by: INTERNAL MEDICINE

## 2023-02-22 PROCEDURE — G8399 PT W/DXA RESULTS DOCUMENT: HCPCS | Performed by: INTERNAL MEDICINE

## 2023-02-22 PROCEDURE — 1036F TOBACCO NON-USER: CPT | Performed by: INTERNAL MEDICINE

## 2023-02-22 PROCEDURE — 3074F SYST BP LT 130 MM HG: CPT | Performed by: INTERNAL MEDICINE

## 2023-02-22 PROCEDURE — 1090F PRES/ABSN URINE INCON ASSESS: CPT | Performed by: INTERNAL MEDICINE

## 2023-02-22 PROCEDURE — 3078F DIAST BP <80 MM HG: CPT | Performed by: INTERNAL MEDICINE

## 2023-02-22 PROCEDURE — 3017F COLORECTAL CA SCREEN DOC REV: CPT | Performed by: INTERNAL MEDICINE

## 2023-02-22 PROCEDURE — 1123F ACP DISCUSS/DSCN MKR DOCD: CPT | Performed by: INTERNAL MEDICINE

## 2023-02-22 PROCEDURE — 99215 OFFICE O/P EST HI 40 MIN: CPT | Performed by: INTERNAL MEDICINE

## 2023-02-22 PROCEDURE — G8417 CALC BMI ABV UP PARAM F/U: HCPCS | Performed by: INTERNAL MEDICINE

## 2023-02-22 RX ORDER — DOXYCYCLINE HYCLATE 100 MG
100 TABLET ORAL 2 TIMES DAILY
Qty: 20 TABLET | Refills: 0 | Status: SHIPPED | OUTPATIENT
Start: 2023-02-22 | End: 2023-03-04

## 2023-02-22 RX ORDER — METHYLPREDNISOLONE 4 MG/1
TABLET ORAL
Qty: 1 KIT | Refills: 0 | Status: SHIPPED | OUTPATIENT
Start: 2023-02-22 | End: 2023-02-28

## 2023-02-22 RX ORDER — ALBUTEROL SULFATE 90 UG/1
2 AEROSOL, METERED RESPIRATORY (INHALATION) 4 TIMES DAILY PRN
Qty: 54 G | Refills: 1 | Status: SHIPPED | OUTPATIENT
Start: 2023-02-22

## 2023-02-22 RX ADMIN — ALBUTEROL SULFATE 2.5 MG: 2.5 SOLUTION RESPIRATORY (INHALATION) at 14:57

## 2023-02-22 RX ADMIN — ALBUTEROL SULFATE 2.5 MG: 2.5 SOLUTION RESPIRATORY (INHALATION) at 15:01

## 2023-02-22 SDOH — ECONOMIC STABILITY: FOOD INSECURITY: WITHIN THE PAST 12 MONTHS, THE FOOD YOU BOUGHT JUST DIDN'T LAST AND YOU DIDN'T HAVE MONEY TO GET MORE.: NEVER TRUE

## 2023-02-22 SDOH — ECONOMIC STABILITY: HOUSING INSECURITY
IN THE LAST 12 MONTHS, WAS THERE A TIME WHEN YOU DID NOT HAVE A STEADY PLACE TO SLEEP OR SLEPT IN A SHELTER (INCLUDING NOW)?: NO

## 2023-02-22 SDOH — ECONOMIC STABILITY: FOOD INSECURITY: WITHIN THE PAST 12 MONTHS, YOU WORRIED THAT YOUR FOOD WOULD RUN OUT BEFORE YOU GOT MONEY TO BUY MORE.: NEVER TRUE

## 2023-02-22 SDOH — ECONOMIC STABILITY: INCOME INSECURITY: HOW HARD IS IT FOR YOU TO PAY FOR THE VERY BASICS LIKE FOOD, HOUSING, MEDICAL CARE, AND HEATING?: NOT HARD AT ALL

## 2023-02-22 ASSESSMENT — ENCOUNTER SYMPTOMS
SINUS PAIN: 0
ABDOMINAL DISTENTION: 0
DIARRHEA: 0
SHORTNESS OF BREATH: 1
ABDOMINAL PAIN: 0
COUGH: 1
SINUS COMPLAINT: 1
RHINORRHEA: 0
NAUSEA: 0
CONSTIPATION: 0
WHEEZING: 0
FACIAL SWELLING: 0
CHEST TIGHTNESS: 0
BLOOD IN STOOL: 0

## 2023-02-22 NOTE — PROGRESS NOTES
Margy Rossi (:  1955) is a 78 y.o. female,Established patient, here for evaluation of the following chief complaint(s):  Check-Up and Sinus Problem (Pt complains of cough and congestion)         ASSESSMENT/PLAN:  1. Severe obesity (BMI 35.0-39. 9) with comorbidity ([facility]  Discussed with patient about and diet she cannot exercise a lot but I discussed with her she can start walking again little more specially after her acute illness is better. 2. Bipolar 1 disorder ([facility]  She follows with Dr. Mikaela Engle  is continue Clozaril and Pristiq she has been stable and doing well. 3. Stage 3b chronic kidney disease ([facility]  Her baseline creatinine 1-1 0.4-1.6 last time was checked was 2 weeks ago was 1.4. She follows with Dr. Rufino Kelly. 4. Impaired fasting glucose  Check the A1c with the next blood work. And decrease carbs in diet. 5. 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 If her diarrhea gets worse then she can take it daily. She has been seeing Dr. Mahad Kumar infectious disease  6. Mixed hyperlipidemia  Continue simvastatin 20 mg daily. Try to watch her diet. 7. Acquired hypothyroidism  Continue levothyroxine 50 mcg daily we will check her TSH with the next blood work. 8. Primary hypertension  Blood pressure suboptimal but she is sick today will monitor it closely continue Lopressor 12.5 twice a day. 9. Kidney transplanted  Status post renal transplant in 2015 in [facility] and she will see the transplant doctor in March. Continue CellCept and cyclosporine. .  10. Moderate persistent asthmatic bronchitis with acute exacerbation  COVID test negative we will give her aerosol treatment with albuterol  We will start antibiotic doxycycline 100 twice a day for 10 days. Albuterol inhaler. I will try to order her nebulizer since she does not know how to use an inhaler. Then Medrol Dosepak.   We will get a chest x-ray   if not any better or getting worse to go to ER. Depo-Medrol 80 IM x1  Aerosol treatment with albuterol x2  Prolonged exam after 2 aerosol treatments much better with very few rhonchi    11. Age-related osteoporosis without current pathological fracture  She needs to start on bisphosphonates but she is going for dental work next month I will wait and see if she is having extensive dental work. Continue calcium and vitamin D try some weightbearing exercises. Apparently had a urinary tract infection beginning of February and was given infusion daily with ertapenem by Dr. Rosa Hathaway she finished and she just finished about a week ago    Mammogram was done October 2022 negative  DEXA scan was done October 2022 showed osteoporosis  Colonoscopy was done May 2021 was long and twisted colon showed diverticulosis and 1 hyperplastic polyp to be repeated in 5 years Dr. Laura Orozco  Return in about 10 days (around 3/4/2023). Subjective   SUBJECTIVE/OBJECTIVE:  She has been  having cough and phlegm x 1 week , yellow , and no fever or chills , but tired  and some SOB with ambulation     Sinus Problem  Associated symptoms include coughing and shortness of breath. Pertinent negatives include no chills, congestion or headaches. Review of Systems   Constitutional:  Negative for appetite change, chills, fatigue, fever and unexpected weight change. HENT:  Negative for congestion, facial swelling, mouth sores, rhinorrhea and sinus pain. Eyes:  Negative for visual disturbance. Respiratory:  Positive for cough and shortness of breath. Negative for chest tightness and wheezing. Cardiovascular:  Negative for chest pain and leg swelling. Gastrointestinal:  Negative for abdominal distention, abdominal pain, blood in stool, constipation, diarrhea and nausea. Genitourinary:  Negative for difficulty urinating, dysuria, frequency and urgency. Musculoskeletal:  Negative for joint swelling. Skin:  Negative for rash.    Neurological: Negative for dizziness, syncope, weakness, light-headedness and headaches. Psychiatric/Behavioral:  Negative for behavioral problems, confusion and hallucinations.          Objective   BP (!) 140/80   Pulse 89   Temp 97.9 °F (36.6 °C) (Temporal)   Ht 5' 2\" (1.575 m)   Wt 200 lb 9.6 oz (91 kg)   SpO2 91%   BMI 36.69 kg/m²   CBC with Differential:    Lab Results   Component Value Date/Time    WBC 9.7 02/02/2023 10:20 AM    RBC 3.49 02/02/2023 10:20 AM    HGB 11.5 02/02/2023 10:20 AM    HCT 38.9 02/02/2023 10:20 AM     02/02/2023 10:20 AM    .5 02/02/2023 10:20 AM    MCH 33.0 02/02/2023 10:20 AM    MCHC 29.6 02/02/2023 10:20 AM    RDW 14.8 02/02/2023 10:20 AM    NRBC 0.9 03/22/2022 06:17 AM    METASPCT 1.7 03/22/2022 06:17 AM    LYMPHOPCT 7.0 02/02/2023 10:20 AM    MONOPCT 3.5 02/02/2023 10:20 AM    MYELOPCT 0.9 05/22/2022 06:26 AM    BASOPCT 0.4 02/02/2023 10:20 AM    MONOSABS 0.39 02/02/2023 10:20 AM    LYMPHSABS 0.68 02/02/2023 10:20 AM    EOSABS 0.00 02/02/2023 10:20 AM    BASOSABS 0.00 02/02/2023 10:20 AM     CMP:    Lab Results   Component Value Date/Time     02/02/2023 10:20 AM    K 4.5 02/02/2023 10:20 AM    K 4.6 10/07/2022 12:45 PM     02/02/2023 10:20 AM    CO2 27 02/02/2023 10:20 AM    BUN 24 02/02/2023 10:20 AM    CREATININE 1.4 02/02/2023 10:20 AM    GFRAA 36 10/07/2022 12:45 PM    LABGLOM 41 02/02/2023 10:20 AM    GLUCOSE 92 02/02/2023 10:20 AM    PROT 6.2 02/02/2023 10:20 AM    LABALBU 3.9 02/02/2023 10:20 AM    CALCIUM 9.9 02/02/2023 10:20 AM    BILITOT 0.3 02/02/2023 10:20 AM    ALKPHOS 69 02/02/2023 10:20 AM    AST 14 02/02/2023 10:20 AM    ALT 9 02/02/2023 10:20 AM     HgBA1c:    Lab Results   Component Value Date/Time    LABA1C 5.7 11/17/2021 02:16 PM     Lipid: No results found for: CHOL  No results found for: TRIG  No results found for: HDL  No results found for: LDLCHOLESTEROL, LDLCALC  No results found for: LABVLDL, VLDL  No results found for: CHOLHDLRATIO  TSH: Lab Results   Component Value Date/Time    TSH 0.101 05/20/2022 08:19 AM     Free T3: No results found for: T3FREE  Free T4:   Lab Results   Component Value Date/Time    T4FREE 1.04 03/10/2022 01:29 PM     Physical Exam  Constitutional:       Appearance: Normal appearance. HENT:      Head: Normocephalic and atraumatic. Mouth/Throat:      Pharynx: Oropharynx is clear. Eyes:      Extraocular Movements: Extraocular movements intact. Pupils: Pupils are equal, round, and reactive to light. Cardiovascular:      Rate and Rhythm: Normal rate and regular rhythm. Pulses: Normal pulses. Heart sounds: Normal heart sounds. No murmur heard. Pulmonary:      Comments: Scattered coarse rhonchi and  wheezing bilateral  Abdominal:      General: There is no distension. Palpations: Abdomen is soft. There is no mass. Tenderness: There is no abdominal tenderness. There is no guarding or rebound. Musculoskeletal:         General: No swelling. Normal range of motion. Cervical back: Normal range of motion and neck supple. Right lower leg: No edema. Left lower leg: No edema. Skin:     General: Skin is warm. Neurological:      General: No focal deficit present. Mental Status: She is alert and oriented to person, place, and time. Mental status is at baseline. An electronic signature was used to authenticate this note.     --Jase Lynch MD

## 2023-02-23 ENCOUNTER — TELEPHONE (OUTPATIENT)
Dept: INTERNAL MEDICINE CLINIC | Age: 68
End: 2023-02-23

## 2023-02-23 ENCOUNTER — HOSPITAL ENCOUNTER (OUTPATIENT)
Age: 68
Discharge: HOME | End: 2023-02-25
Payer: MEDICARE

## 2023-02-23 ENCOUNTER — HOSPITAL ENCOUNTER (OUTPATIENT)
Dept: GENERAL RADIOLOGY | Age: 68
Discharge: HOME | End: 2023-02-25
Payer: MEDICARE

## 2023-02-23 DIAGNOSIS — J45.41 MODERATE PERSISTENT ASTHMATIC BRONCHITIS WITH ACUTE EXACERBATION: ICD-10-CM

## 2023-02-23 PROCEDURE — 71046 X-RAY EXAM CHEST 2 VIEWS: CPT

## 2023-02-23 NOTE — TELEPHONE ENCOUNTER
----- Message from [employer] sent at 2/23/2023  1:37 PM EST -----  Subject: Message to Provider    QUESTIONS  Information for Provider? pt needs a nebulizer but her insurance needs it   to be a generic machine. please contact the pt  ---------------------------------------------------------------------------  --------------  Kalpana MORENO  [phone]; OK to leave message on voicemail  ---------------------------------------------------------------------------  --------------  SCRIPT ANSWERS  Relationship to Patient?  Self

## 2023-02-24 RX ORDER — ALBUTEROL SULFATE 2.5 MG/3ML
2.5 SOLUTION RESPIRATORY (INHALATION) ONCE
Status: COMPLETED | OUTPATIENT
Start: 2023-02-22 | End: 2023-02-22

## 2023-03-02 ENCOUNTER — HOSPITAL ENCOUNTER (OUTPATIENT)
Age: 68
Discharge: HOME | End: 2023-03-02

## 2023-03-09 ENCOUNTER — TELEPHONE (OUTPATIENT)
Dept: INTERNAL MEDICINE CLINIC | Age: 68
End: 2023-03-09

## 2023-03-09 ENCOUNTER — OFFICE VISIT (OUTPATIENT)
Dept: INTERNAL MEDICINE CLINIC | Age: 68
End: 2023-03-09

## 2023-03-09 VITALS
BODY MASS INDEX: 36.8 KG/M2 | HEART RATE: 95 BPM | OXYGEN SATURATION: 90 % | DIASTOLIC BLOOD PRESSURE: 80 MMHG | SYSTOLIC BLOOD PRESSURE: 138 MMHG | TEMPERATURE: 97.4 F | WEIGHT: 200 LBS | HEIGHT: 62 IN

## 2023-03-09 DIAGNOSIS — R73.01 IMPAIRED FASTING GLUCOSE: ICD-10-CM

## 2023-03-09 DIAGNOSIS — E03.9 ACQUIRED HYPOTHYROIDISM: ICD-10-CM

## 2023-03-09 DIAGNOSIS — S00.411D ABRASION OF RIGHT EAR, SUBSEQUENT ENCOUNTER: ICD-10-CM

## 2023-03-09 DIAGNOSIS — N18.32 STAGE 3B CHRONIC KIDNEY DISEASE (HCC): ICD-10-CM

## 2023-03-09 DIAGNOSIS — E66.01 SEVERE OBESITY (BMI 35.0-39.9) WITH COMORBIDITY (HCC): ICD-10-CM

## 2023-03-09 DIAGNOSIS — Z00.00 ANNUAL PHYSICAL EXAM: Primary | ICD-10-CM

## 2023-03-09 DIAGNOSIS — I10 PRIMARY HYPERTENSION: ICD-10-CM

## 2023-03-09 DIAGNOSIS — F31.9 BIPOLAR 1 DISORDER (HCC): ICD-10-CM

## 2023-03-09 DIAGNOSIS — Z94.0 KIDNEY TRANSPLANTED: ICD-10-CM

## 2023-03-09 DIAGNOSIS — E78.2 MIXED HYPERLIPIDEMIA: ICD-10-CM

## 2023-03-09 RX ORDER — BACITRACIN ZINC AND POLYMYXIN B SULFATE 500; 1000 [USP'U]/G; [USP'U]/G
OINTMENT TOPICAL 2 TIMES DAILY
Qty: 15 G | Refills: 0 | Status: SHIPPED | OUTPATIENT
Start: 2023-03-09

## 2023-03-09 ASSESSMENT — PATIENT HEALTH QUESTIONNAIRE - PHQ9
SUM OF ALL RESPONSES TO PHQ QUESTIONS 1-9: 0
4. FEELING TIRED OR HAVING LITTLE ENERGY: 0
SUM OF ALL RESPONSES TO PHQ QUESTIONS 1-9: 0
6. FEELING BAD ABOUT YOURSELF - OR THAT YOU ARE A FAILURE OR HAVE LET YOURSELF OR YOUR FAMILY DOWN: 0
2. FEELING DOWN, DEPRESSED OR HOPELESS: 0
1. LITTLE INTEREST OR PLEASURE IN DOING THINGS: 0
8. MOVING OR SPEAKING SO SLOWLY THAT OTHER PEOPLE COULD HAVE NOTICED. OR THE OPPOSITE, BEING SO FIGETY OR RESTLESS THAT YOU HAVE BEEN MOVING AROUND A LOT MORE THAN USUAL: 0
SUM OF ALL RESPONSES TO PHQ QUESTIONS 1-9: 0
9. THOUGHTS THAT YOU WOULD BE BETTER OFF DEAD, OR OF HURTING YOURSELF: 0
3. TROUBLE FALLING OR STAYING ASLEEP: 0
5. POOR APPETITE OR OVEREATING: 0
SUM OF ALL RESPONSES TO PHQ QUESTIONS 1-9: 0
SUM OF ALL RESPONSES TO PHQ9 QUESTIONS 1 & 2: 0
7. TROUBLE CONCENTRATING ON THINGS, SUCH AS READING THE NEWSPAPER OR WATCHING TELEVISION: 0

## 2023-03-09 ASSESSMENT — ENCOUNTER SYMPTOMS
ABDOMINAL PAIN: 0
SHORTNESS OF BREATH: 0
WHEEZING: 0
FACIAL SWELLING: 0
ABDOMINAL DISTENTION: 0
SINUS PAIN: 0
COUGH: 0
CONSTIPATION: 0
NAUSEA: 0
RHINORRHEA: 0
CHEST TIGHTNESS: 0
DIARRHEA: 0
BLOOD IN STOOL: 0

## 2023-03-09 NOTE — PROGRESS NOTES
Yoseph Rangel (:  1955) is a 78 y.o. female,Established patient, here for evaluation of the following chief complaint(s):  Pre-op Exam (Rt cataract) and Otalgia (Has scabbing in ear)         ASSESSMENT/PLAN:  1. Annual physical exam  -     EKG 12 Lead  2. Impaired fasting glucose  -     Vitamin D 25 Hydroxy; Future  -     Vitamin B12 & Folate; Future  -     Hemoglobin A1C; Future  -     Lipid, Fasting; Future  -     T4, Free; Future  -     TSH; Future  -     Comprehensive Metabolic Panel; Future  -     CBC with Auto Differential; Future  Decrease carbs in diet and we will recheck her A1c with the next blood work. 3. Stage 3b chronic kidney disease (HCC)  -     Vitamin D 25 Hydroxy; Future  -     Vitamin B12 & Folate; Future  -     Hemoglobin A1C; Future  -     Lipid, Fasting; Future  -     T4, Free; Future  -     TSH; Future  -     Comprehensive Metabolic Panel; Future  -     CBC with Auto Differential; Future  -     Comprehensive Metabolic Panel; Future  Chronic renal failure status post renal transplant  [facility]  Ensure to follow with her transplant physician this month. Creatinine 1.6 at her baseline is between 1.4-1.7. I will repeat it in 2 weeks to make sure it is stable since month ago was 1.4.  4. Mixed hyperlipidemia  -     Vitamin D 25 Hydroxy; Future  -     Vitamin B12 & Folate; Future  -     Hemoglobin A1C; Future  -     Lipid, Fasting; Future  -     T4, Free; Future  -     TSH; Future  -     Comprehensive Metabolic Panel; Future  -     CBC with Auto Differential; Future  Continue simvastatin 20 mg daily. Will check lipid profile next visit. 5. Acquired hypothyroidism  -     Vitamin D 25 Hydroxy; Future  -     Vitamin B12 & Folate; Future  -     Hemoglobin A1C; Future  -     Lipid, Fasting; Future  -     T4, Free; Future  -     TSH; Future  -     Comprehensive Metabolic Panel; Future  -     CBC with Auto Differential; Future  Continue Synthroid 50 mcg daily.   We will check her TSH next visit. 6. Severe obesity (BMI 35.0-39. 9) with comorbidity [facility])  Discussed with patient about diet and trying to lose weight. She needs to increase her protein intake. 7. Primary hypertension  Blood pressure optimal  Continue Lopressor 12.5 twice a day. 8. Kidney transplanted  She had renal transplant October 2015 [facility] continue CellCept and cyclosporine. 9. Bipolar 1 disorder ([address]  Is stable though she is on Clozaril and Pristiq as she also follows with Dr. Aimee Bacon  closely. 10. Abrasion of right ear, subsequent encounter  She has irritated scab in the right ear pinna I discussed with patient to use the Polysporin ointment and warm compresses and if its not better in the next week to let me know. Apparently she had it cauterized with Dr. Jeremias Hough before and and showed it to him afterwards and he told her that it was healing. 11.  Osteoporosis  And she is supposed to start on bisphosphonates but she is going for dental work next month I will wait until this is done and I will start her bisphosphonates continue calcium and vitamin D. 12.  Asthmatic bronchitis resolved if she finished the doxycycline and a Medrol Dosepak last week and she is feeling much better her lungs are totally clear. 13.  Left lower extremity DVT that was treated with Eliquis she also has IVC filter placed. I was out of town when patient was in the hospital during that episode and she stayed on the Eliquis questionable atrial fibrillation I will check with Dr. Olivier Melendrez if she did have atrial fibrillation she needs to be on lifelong anticoagulation we will take her off the Eliquis I discussed with patient also.    Had DVT in there left femoral vein 2/2022  Mammogram was done October 2022 was negative  DEXA scan done October 2022 showed osteoporosis  Colonoscopy was done May 2021 was long and twisted colon showed diverticulosis and 1 hyperplastic polyp needs repeat in 5 years Dr. Heike Chan    Return in about 3 months (around 6/9/2023). Subjective   SUBJECTIVE/OBJECTIVE:  Otalgia   Pertinent negatives include no abdominal pain, coughing, diarrhea, headaches, rash or rhinorrhea. Review of Systems   Constitutional:  Negative for appetite change, chills, fatigue, fever and unexpected weight change. HENT:  Positive for ear pain. Negative for congestion, facial swelling, mouth sores, rhinorrhea and sinus pain. Eyes:  Negative for visual disturbance. Respiratory:  Negative for cough, chest tightness, shortness of breath and wheezing. Cardiovascular:  Negative for chest pain and leg swelling. Gastrointestinal:  Negative for abdominal distention, abdominal pain, blood in stool, constipation, diarrhea and nausea. Genitourinary:  Negative for difficulty urinating, dysuria, frequency and urgency. Musculoskeletal:  Negative for joint swelling. Skin:  Negative for rash. Neurological:  Negative for dizziness, syncope, weakness, light-headedness and headaches. Psychiatric/Behavioral:  Negative for behavioral problems, confusion and hallucinations.          Objective   /80   Pulse 95   Temp 97.4 °F (36.3 °C) (Temporal)   Ht 5' 2\" (1.575 m)   Wt 200 lb (90.7 kg)   SpO2 90%   BMI 36.58 kg/m²   CBC with Differential:    Lab Results   Component Value Date/Time    WBC 9.7 02/02/2023 10:20 AM    RBC 3.49 02/02/2023 10:20 AM    HGB 11.5 02/02/2023 10:20 AM    HCT 38.9 02/02/2023 10:20 AM     02/02/2023 10:20 AM    .5 02/02/2023 10:20 AM    MCH 33.0 02/02/2023 10:20 AM    MCHC 29.6 02/02/2023 10:20 AM    RDW 14.8 02/02/2023 10:20 AM    NRBC 0.9 03/22/2022 06:17 AM    METASPCT 1.7 03/22/2022 06:17 AM    LYMPHOPCT 7.0 02/02/2023 10:20 AM    MONOPCT 3.5 02/02/2023 10:20 AM    MYELOPCT 0.9 05/22/2022 06:26 AM    BASOPCT 0.4 02/02/2023 10:20 AM    MONOSABS 0.39 02/02/2023 10:20 AM    LYMPHSABS 0.68 02/02/2023 10:20 AM    EOSABS 0.00 02/02/2023 10:20 AM    BASOSABS 0.00 02/02/2023 10:20 AM     CMP:    Lab Results   Component Value Date/Time     02/02/2023 10:20 AM    K 4.5 02/02/2023 10:20 AM    K 4.6 10/07/2022 12:45 PM     02/02/2023 10:20 AM    CO2 27 02/02/2023 10:20 AM    BUN 24 02/02/2023 10:20 AM    CREATININE 1.4 02/02/2023 10:20 AM    GFRAA 36 10/07/2022 12:45 PM    LABGLOM 41 02/02/2023 10:20 AM    GLUCOSE 92 02/02/2023 10:20 AM    PROT 6.2 02/02/2023 10:20 AM    LABALBU 3.9 02/02/2023 10:20 AM    CALCIUM 9.9 02/02/2023 10:20 AM    BILITOT 0.3 02/02/2023 10:20 AM    ALKPHOS 69 02/02/2023 10:20 AM    AST 14 02/02/2023 10:20 AM    ALT 9 02/02/2023 10:20 AM     HgBA1c:    Lab Results   Component Value Date/Time    LABA1C 5.7 11/17/2021 02:16 PM     Lipid: No results found for: CHOL  No results found for: TRIG  No results found for: HDL  No results found for: LDLCHOLESTEROL, LDLCALC  No results found for: LABVLDL, VLDL  No results found for: CHOLHDLRATIO  TSH:    Lab Results   Component Value Date/Time    TSH 0.101 05/20/2022 08:19 AM     Free T3: No results found for: T3FREE  Free T4:   Lab Results   Component Value Date/Time    T4FREE 1.04 03/10/2022 01:29 PM     Physical Exam  Constitutional:       Appearance: Normal appearance. HENT:      Head: Normocephalic and atraumatic. Ears:        Mouth/Throat:      Pharynx: Oropharynx is clear. Eyes:      Extraocular Movements: Extraocular movements intact. Pupils: Pupils are equal, round, and reactive to light. Cardiovascular:      Rate and Rhythm: Normal rate and regular rhythm. Pulses: Normal pulses. Heart sounds: Normal heart sounds. No murmur heard. Pulmonary:      Effort: Pulmonary effort is normal.      Breath sounds: Normal breath sounds. Abdominal:      General: There is distension. Palpations: Abdomen is soft. There is no mass. Tenderness: There is no abdominal tenderness. There is no guarding or rebound. Musculoskeletal:         General: No swelling. Normal range of motion. Cervical back: Normal range of motion and neck supple. Right lower leg: No edema. Left lower leg: No edema. Skin:     General: Skin is warm. Neurological:      General: No focal deficit present. Mental Status: She is alert and oriented to person, place, and time. Mental status is at baseline. An electronic signature was used to authenticate this note.     --Homero Jama MD

## 2023-03-09 NOTE — TELEPHONE ENCOUNTER
Pt was seen today said she was supposed to get a rx for antibiotic cream for her ear asking o please send to [address]

## 2023-03-22 LAB
BACTERIA, URINE: ABNORMAL /HPF
CREATININE (MG/DL) IN URINE: 99.1 MG/DL (ref 20–320)
PROTEIN (MG/DL) IN URINE: 39 MG/DL (ref 5–24)
PROTEIN/CREATININE (MG/MG) IN URINE: 0.39 MG/MG CREAT (ref 0–0.17)
RBC, URINE: ABNORMAL /HPF (ref 0–5)
SQUAMOUS EPITHELIAL CELLS, URINE: 8 /HPF
WBC CLUMPS, URINE: ABNORMAL /HPF
WBC, URINE: 110 /HPF (ref 0–5)

## 2023-03-23 ENCOUNTER — TELEPHONE (OUTPATIENT)
Dept: VASCULAR SURGERY | Age: 68
End: 2023-03-23

## 2023-03-23 ENCOUNTER — TELEPHONE (OUTPATIENT)
Dept: INTERNAL MEDICINE CLINIC | Age: 68
End: 2023-03-23

## 2023-03-23 ENCOUNTER — OFFICE VISIT (OUTPATIENT)
Dept: CARDIOLOGY CLINIC | Age: 68
End: 2023-03-23

## 2023-03-23 VITALS
SYSTOLIC BLOOD PRESSURE: 126 MMHG | HEIGHT: 62 IN | DIASTOLIC BLOOD PRESSURE: 80 MMHG | RESPIRATION RATE: 18 BRPM | BODY MASS INDEX: 36.8 KG/M2 | HEART RATE: 86 BPM | WEIGHT: 200 LBS

## 2023-03-23 DIAGNOSIS — E03.9 ACQUIRED HYPOTHYROIDISM: ICD-10-CM

## 2023-03-23 DIAGNOSIS — I44.7 LBBB (LEFT BUNDLE BRANCH BLOCK): ICD-10-CM

## 2023-03-23 DIAGNOSIS — I10 ESSENTIAL HYPERTENSION: ICD-10-CM

## 2023-03-23 DIAGNOSIS — I73.9 PAD (PERIPHERAL ARTERY DISEASE) (HCC): ICD-10-CM

## 2023-03-23 DIAGNOSIS — Z86.79 HISTORY OF CARDIOMYOPATHY: Primary | ICD-10-CM

## 2023-03-23 LAB — URINE CULTURE: ABNORMAL

## 2023-03-23 RX ORDER — ACYCLOVIR 400 MG/1
400 TABLET ORAL 2 TIMES DAILY
COMMUNITY

## 2023-03-23 RX ORDER — VANCOMYCIN HYDROCHLORIDE 125 MG/1
125 CAPSULE ORAL
COMMUNITY

## 2023-03-23 NOTE — PROGRESS NOTES
OFFICE VISIT     PRIMARY CARE PHYSICIAN:      Conrado Euceda MD       ALLERGIES / SENSITIVITIES:        Allergies   Allergen Reactions    Cipro [Ciprofloxacin Hcl]     Macrodantin [Nitrofurantoin Macrocrystal] Nausea Only    Sulfa Antibiotics Nausea Only          REVIEWED MEDICATIONS:        Current Outpatient Medications:     vancomycin (VANCOCIN) 125 MG capsule, Take 125 mg by mouth MON/WED/FRI, Disp: , Rfl:     acyclovir (ZOVIRAX) 400 MG tablet, Take 400 mg by mouth 2 times daily, Disp: , Rfl:     Probiotic Product (PROBIOTIC DAILY PO), Take by mouth, Disp: , Rfl:     albuterol sulfate HFA (VENTOLIN HFA) 108 (90 Base) MCG/ACT inhaler, Inhale 2 puffs into the lungs 4 times daily as needed for Wheezing, Disp: 54 g, Rfl: 1    apixaban (ELIQUIS) 5 MG TABS tablet, Take 1 tablet by mouth 2 times daily, Disp: 60 tablet, Rfl: 3    levothyroxine (SYNTHROID) 50 MCG tablet, Take 1 tablet by mouth daily, Disp: 90 tablet, Rfl: 1    allopurinol (ZYLOPRIM) 100 MG tablet, Take 1 tablet by mouth daily, Disp: 90 tablet, Rfl: 1    calcitRIOL (ROCALTROL) 0.25 MCG capsule, Take 0.25 mcg by mouth three times a week M w fri, Disp: , Rfl:     simvastatin (ZOCOR) 20 MG tablet, Take 20 mg by mouth nightly, Disp: , Rfl:     docusate sodium (COLACE) 100 MG capsule, Take 100 mg by mouth 2 times daily, Disp: , Rfl:     furosemide (LASIX) 20 MG tablet, Take 1 tablet by mouth daily as needed (edema), Disp: 60 tablet, Rfl: 0    predniSONE (DELTASONE) 5 MG tablet, Take 5 mg by mouth daily , Disp: , Rfl:     cloZAPine (CLOZARIL) 50 MG tablet, Take 50 mg by mouth nightly, Disp: , Rfl:     acetaminophen (TYLENOL) 325 MG tablet, Take 650 mg by mouth every 6 hours as needed for Pain or Fever, Disp: , Rfl:     vitamin D (CHOLECALCIFEROL) 25 MCG (1000 UT) TABS tablet, Take 1,000 Units by mouth daily, Disp: , Rfl:     metoprolol tartrate (LOPRESSOR) 25 MG tablet, Take 0.5 tablets by mouth 2 times daily, Disp: 60 tablet, Rfl: 3    desvenlafaxine

## 2023-03-23 NOTE — TELEPHONE ENCOUNTER
Received referral from Dr. Adrian Sen for PAD, left message for patient to schedule appointment to see Dr. Slade Baca. This is a returning patient.

## 2023-03-24 DIAGNOSIS — N18.32 STAGE 3B CHRONIC KIDNEY DISEASE (HCC): Primary | ICD-10-CM

## 2023-04-04 ENCOUNTER — TELEPHONE (OUTPATIENT)
Dept: INTERNAL MEDICINE CLINIC | Age: 68
End: 2023-04-04

## 2023-04-05 ENCOUNTER — TELEPHONE (OUTPATIENT)
Dept: VASCULAR SURGERY | Age: 68
End: 2023-04-05

## 2023-04-26 ENCOUNTER — HOSPITAL ENCOUNTER (EMERGENCY)
Age: 68
Discharge: HOME | End: 2023-04-26
Attending: EMERGENCY MEDICINE
Payer: COMMERCIAL

## 2023-04-26 ENCOUNTER — APPOINTMENT (OUTPATIENT)
Dept: GENERAL RADIOLOGY | Age: 68
End: 2023-04-26
Payer: COMMERCIAL

## 2023-04-26 ENCOUNTER — APPOINTMENT (OUTPATIENT)
Dept: CT IMAGING | Age: 68
End: 2023-04-26
Payer: COMMERCIAL

## 2023-04-26 VITALS
BODY MASS INDEX: 36.95 KG/M2 | TEMPERATURE: 98.7 F | RESPIRATION RATE: 20 BRPM | DIASTOLIC BLOOD PRESSURE: 93 MMHG | WEIGHT: 202 LBS | HEART RATE: 81 BPM | SYSTOLIC BLOOD PRESSURE: 161 MMHG | OXYGEN SATURATION: 94 %

## 2023-04-26 DIAGNOSIS — R42 LIGHTHEADEDNESS: ICD-10-CM

## 2023-04-26 DIAGNOSIS — N30.00 ACUTE CYSTITIS WITHOUT HEMATURIA: Primary | ICD-10-CM

## 2023-04-26 LAB
ALBUMIN SERPL-MCNC: 3.9 G/DL (ref 3.5–5.2)
ALP SERPL-CCNC: 66 U/L (ref 35–104)
ALT SERPL-CCNC: 9 U/L (ref 0–32)
ANION GAP SERPL CALCULATED.3IONS-SCNC: 15 MMOL/L (ref 7–16)
AST SERPL-CCNC: 16 U/L (ref 0–31)
BACTERIA URNS QL MICRO: ABNORMAL /HPF
BASOPHILS # BLD: 0.03 E9/L (ref 0–0.2)
BASOPHILS NFR BLD: 0.3 % (ref 0–2)
BILIRUB SERPL-MCNC: 0.5 MG/DL (ref 0–1.2)
BILIRUB UR QL STRIP: NEGATIVE
BUN SERPL-MCNC: 24 MG/DL (ref 6–23)
CALCIUM SERPL-MCNC: 10.3 MG/DL (ref 8.6–10.2)
CHLORIDE SERPL-SCNC: 99 MMOL/L (ref 98–107)
CLARITY UR: ABNORMAL
CO2 SERPL-SCNC: 24 MMOL/L (ref 22–29)
COLOR UR: YELLOW
CREAT SERPL-MCNC: 1.7 MG/DL (ref 0.5–1)
EOSINOPHIL # BLD: 0.02 E9/L (ref 0.05–0.5)
EOSINOPHIL NFR BLD: 0.2 % (ref 0–6)
EPI CELLS #/AREA URNS HPF: ABNORMAL /HPF
ERYTHROCYTE [DISTWIDTH] IN BLOOD BY AUTOMATED COUNT: 14.5 FL (ref 11.5–15)
GLUCOSE SERPL-MCNC: 144 MG/DL (ref 74–99)
GLUCOSE UR STRIP-MCNC: NEGATIVE MG/DL
HCT VFR BLD AUTO: 43.4 % (ref 34–48)
HGB BLD-MCNC: 13.2 G/DL (ref 11.5–15.5)
HGB UR QL STRIP: ABNORMAL
IMM GRANULOCYTES # BLD: 0.07 E9/L
IMM GRANULOCYTES NFR BLD: 0.7 % (ref 0–5)
KETONES UR STRIP-MCNC: NEGATIVE MG/DL
LEUKOCYTE ESTERASE UR QL STRIP: ABNORMAL
LYMPHOCYTES # BLD: 0.52 E9/L (ref 1.5–4)
LYMPHOCYTES NFR BLD: 4.9 % (ref 20–42)
MCH RBC QN AUTO: 33.5 PG (ref 26–35)
MCHC RBC AUTO-ENTMCNC: 30.4 % (ref 32–34.5)
MCV RBC AUTO: 110.2 FL (ref 80–99.9)
MONOCYTES # BLD: 0.57 E9/L (ref 0.1–0.95)
MONOCYTES NFR BLD: 5.3 % (ref 2–12)
NEUTROPHILS # BLD: 9.48 E9/L (ref 1.8–7.3)
NEUTS SEG NFR BLD: 88.6 % (ref 43–80)
NITRITE UR QL STRIP: NEGATIVE
PH UR STRIP: 6 [PH] (ref 5–9)
PLATELET # BLD AUTO: 149 E9/L (ref 130–450)
PMV BLD AUTO: 12.9 FL (ref 7–12)
POTASSIUM SERPL-SCNC: 5.5 MMOL/L (ref 3.5–5)
PROT SERPL-MCNC: 6.7 G/DL (ref 6.4–8.3)
PROT UR STRIP-MCNC: ABNORMAL MG/DL
RBC # BLD AUTO: 3.94 E12/L (ref 3.5–5.5)
RBC #/AREA URNS HPF: ABNORMAL /HPF (ref 0–2)
SODIUM SERPL-SCNC: 138 MMOL/L (ref 132–146)
SP GR UR STRIP: 1.02 (ref 1–1.03)
TROPONIN, HIGH SENSITIVITY: 38 NG/L (ref 0–9)
TSH SERPL-MCNC: 0.46 UIU/ML (ref 0.27–4.2)
UROBILINOGEN UR STRIP-ACNC: 0.2 E.U./DL
WBC # BLD: 10.7 E9/L (ref 4.5–11.5)
WBC #/AREA URNS HPF: >20 /HPF (ref 0–5)

## 2023-04-26 PROCEDURE — 80053 COMPREHEN METABOLIC PANEL: CPT

## 2023-04-26 PROCEDURE — 87088 URINE BACTERIA CULTURE: CPT

## 2023-04-26 PROCEDURE — 6370000000 HC RX 637 (ALT 250 FOR IP)

## 2023-04-26 PROCEDURE — 2580000003 HC RX 258

## 2023-04-26 PROCEDURE — 84443 ASSAY THYROID STIM HORMONE: CPT

## 2023-04-26 PROCEDURE — 70450 CT HEAD/BRAIN W/O DYE: CPT

## 2023-04-26 PROCEDURE — 85025 COMPLETE CBC W/AUTO DIFF WBC: CPT

## 2023-04-26 PROCEDURE — 71045 X-RAY EXAM CHEST 1 VIEW: CPT

## 2023-04-26 PROCEDURE — 93005 ELECTROCARDIOGRAM TRACING: CPT

## 2023-04-26 PROCEDURE — 84484 ASSAY OF TROPONIN QUANT: CPT

## 2023-04-26 PROCEDURE — 81001 URINALYSIS AUTO W/SCOPE: CPT

## 2023-04-26 PROCEDURE — 99285 EMERGENCY DEPT VISIT HI MDM: CPT

## 2023-04-26 PROCEDURE — 72125 CT NECK SPINE W/O DYE: CPT

## 2023-04-26 RX ORDER — METOPROLOL SUCCINATE 25 MG/1
12.5 TABLET, EXTENDED RELEASE ORAL 2 TIMES DAILY
COMMUNITY

## 2023-04-26 RX ORDER — GRANULES FOR ORAL 3 G/1
3 POWDER ORAL ONCE
Status: COMPLETED | OUTPATIENT
Start: 2023-04-26 | End: 2023-04-26

## 2023-04-26 RX ORDER — GRANULES FOR ORAL 3 G/1
3 POWDER ORAL ONCE
Qty: 1 EACH | Refills: 0 | Status: SHIPPED | OUTPATIENT
Start: 2023-04-26 | End: 2023-04-26

## 2023-04-26 RX ORDER — 0.9 % SODIUM CHLORIDE 0.9 %
1000 INTRAVENOUS SOLUTION INTRAVENOUS ONCE
Status: COMPLETED | OUTPATIENT
Start: 2023-04-26 | End: 2023-04-26

## 2023-04-26 RX ORDER — CEPHALEXIN 250 MG/1
250 CAPSULE ORAL ONCE
Status: DISCONTINUED | OUTPATIENT
Start: 2023-04-26 | End: 2023-04-26

## 2023-04-26 RX ADMIN — SODIUM CHLORIDE 1000 ML: 9 INJECTION, SOLUTION INTRAVENOUS at 13:05

## 2023-04-26 RX ADMIN — GRANULES FOR ORAL SOLUTION 1 PACKET: 3 POWDER ORAL at 16:06

## 2023-04-26 NOTE — ED PROVIDER NOTES
Boone Ernst is a 80 y.o. female    HPI  Boone Ernst is a 80 y.o. female presenting to the ED for Dizziness (Near syncopal episode-fell skin tear left hand)    History comes primarily from the patient. Chestine Barthel is a 60-year-old female who presents following an unwitnessed  fall from mechanical standing position secondary to a syncopal episode. Patient stated that she was coming out of the bathroom, walking to her couch with a wheeled walker when she felt lightheaded and fell. Reports loss of consciousness. Does not recall how long she was unconscious, but called EMS when she regained consciousness. She was then transported to the emergency department. Patient denies any prior history of similar event. She denies any chest pain, palpitation, shortness of breath, or headache prior to fall. Does not believe that she had a seizure. Patient medical history is extensive. Most remarkable is a kidney transplant in 2015 for which she is taking immunosuppressive medications. Patient also underwent cataract surgery couple of weeks ago. Also reports hypothyroidism, depression, hypertension for which are all managed by PCP. Last meal was this morning with 2 cup of coffee. Stated that she had a bowel movement while she was using the bathroom prior to her fall. Patient lives in a senior assisted living facility. ROS  Full review of systems completed. Pertinent positives and negatives per the HPI, unless otherwise stated ROS is negative. Physical Exam  Constitutional:       Appearance: Normal appearance. She is obese. HENT:      Head: Normocephalic and atraumatic. Nose: Nose normal.   Eyes:      Pupils: Pupils are equal, round, and reactive to light. Cardiovascular:      Rate and Rhythm: Normal rate and regular rhythm. Pulmonary:      Effort: Pulmonary effort is normal.      Breath sounds: Normal breath sounds. Abdominal:      General: There is distension.       Palpations:

## 2023-04-27 LAB — BACTERIA UR CULT: NORMAL

## 2023-04-28 ENCOUNTER — TELEPHONE (OUTPATIENT)
Dept: INTERNAL MEDICINE CLINIC | Age: 68
End: 2023-04-28

## 2023-04-28 LAB
EKG ATRIAL RATE: 77 BPM
EKG P AXIS: 49 DEGREES
EKG P-R INTERVAL: 162 MS
EKG Q-T INTERVAL: 424 MS
EKG QRS DURATION: 150 MS
EKG QTC CALCULATION (BAZETT): 479 MS
EKG R AXIS: -63 DEGREES
EKG T AXIS: 102 DEGREES
EKG VENTRICULAR RATE: 77 BPM

## 2023-04-28 PROCEDURE — 93010 ELECTROCARDIOGRAM REPORT: CPT | Performed by: INTERNAL MEDICINE

## 2023-04-28 NOTE — TELEPHONE ENCOUNTER
----- Message from Renetta Figures sent at 4/28/2023  9:13 AM EDT -----  Subject: Appointment Request    Reason for Call: Established Patient Appointment needed: Routine ED Follow   Up Visit    QUESTIONS    Reason for appointment request? No appointments available during search     Additional Information for Provider? Pt said she went to e/d because she   fell and she also has a uti/ Pt was needing to be seen in 5 days. Pt is   wanting to see only her pcp for this. Pt said she got her bloodwork done   already. Pt also has some questions about a rx that has 2 doses. she was   given Fosfomycin 3gm-fachet pt said this was a powder, she was given one   dose in er. Pt said she took second dose yesterday.  Pt wants to know is   that all she needs for her UTI or something else.  ---------------------------------------------------------------------------  --------------  Teagan MORENO  [phone]; OK to leave message on voicemail  ---------------------------------------------------------------------------  --------------  SCRIPT ANSWERS  EDITHID Screen: Baron Burt

## 2023-04-28 NOTE — TELEPHONE ENCOUNTER
Yes the fosfomycin is 3 dosages  only , and try to schedule pt within 1 week , place her on cancellation list

## 2023-05-12 ENCOUNTER — TELEPHONE (OUTPATIENT)
Dept: INTERNAL MEDICINE CLINIC | Age: 68
End: 2023-05-12

## 2023-05-12 RX ORDER — CIPROFLOXACIN 250 MG/1
250 TABLET, FILM COATED ORAL 2 TIMES DAILY
Qty: 14 TABLET | Refills: 0 | Status: SHIPPED | OUTPATIENT
Start: 2023-05-12 | End: 2023-05-19

## 2023-05-12 NOTE — PROGRESS NOTES
She stll has urine infection on the bw and urine done 5/4 , I called in cipro for her , It says pt is allergic ? Causes her nausea only , double check with pt also  about any allergy to cipro ?????  And increase fluids and if having any fever or problems to let me know

## 2023-05-23 ENCOUNTER — OFFICE VISIT (OUTPATIENT)
Dept: INTERNAL MEDICINE CLINIC | Age: 68
End: 2023-05-23
Payer: COMMERCIAL

## 2023-05-23 VITALS
HEIGHT: 62 IN | DIASTOLIC BLOOD PRESSURE: 86 MMHG | HEART RATE: 83 BPM | WEIGHT: 202 LBS | SYSTOLIC BLOOD PRESSURE: 138 MMHG | BODY MASS INDEX: 37.17 KG/M2 | TEMPERATURE: 97.2 F | RESPIRATION RATE: 14 BRPM | OXYGEN SATURATION: 95 %

## 2023-05-23 DIAGNOSIS — M81.0 AGE-RELATED OSTEOPOROSIS WITHOUT CURRENT PATHOLOGICAL FRACTURE: ICD-10-CM

## 2023-05-23 DIAGNOSIS — E78.2 MIXED HYPERLIPIDEMIA: ICD-10-CM

## 2023-05-23 DIAGNOSIS — Z86.718 H/O DEEP VENOUS THROMBOSIS: ICD-10-CM

## 2023-05-23 DIAGNOSIS — Z94.0 KIDNEY TRANSPLANTED: ICD-10-CM

## 2023-05-23 DIAGNOSIS — E03.9 ACQUIRED HYPOTHYROIDISM: ICD-10-CM

## 2023-05-23 DIAGNOSIS — I82.519 CHRONIC DEEP VEIN THROMBOSIS (DVT) OF FEMORAL VEIN, UNSPECIFIED LATERALITY (HCC): ICD-10-CM

## 2023-05-23 DIAGNOSIS — N18.32 STAGE 3B CHRONIC KIDNEY DISEASE (HCC): ICD-10-CM

## 2023-05-23 DIAGNOSIS — I10 PRIMARY HYPERTENSION: ICD-10-CM

## 2023-05-23 DIAGNOSIS — Z12.31 ENCOUNTER FOR SCREENING MAMMOGRAM FOR MALIGNANT NEOPLASM OF BREAST: ICD-10-CM

## 2023-05-23 DIAGNOSIS — E66.01 SEVERE OBESITY (BMI 35.0-39.9) WITH COMORBIDITY (HCC): Primary | ICD-10-CM

## 2023-05-23 DIAGNOSIS — F31.9 BIPOLAR 1 DISORDER (HCC): ICD-10-CM

## 2023-05-23 DIAGNOSIS — H61.891 NODULE OF RIGHT EXTERNAL EAR: ICD-10-CM

## 2023-05-23 PROCEDURE — 3079F DIAST BP 80-89 MM HG: CPT | Performed by: INTERNAL MEDICINE

## 2023-05-23 PROCEDURE — 99215 OFFICE O/P EST HI 40 MIN: CPT | Performed by: INTERNAL MEDICINE

## 2023-05-23 PROCEDURE — 3075F SYST BP GE 130 - 139MM HG: CPT | Performed by: INTERNAL MEDICINE

## 2023-05-23 PROCEDURE — 1123F ACP DISCUSS/DSCN MKR DOCD: CPT | Performed by: INTERNAL MEDICINE

## 2023-05-23 ASSESSMENT — ENCOUNTER SYMPTOMS
BLOOD IN STOOL: 0
ABDOMINAL PAIN: 0
DIARRHEA: 0
SHORTNESS OF BREATH: 0
SINUS PAIN: 0
COUGH: 0
NAUSEA: 0
WHEEZING: 0
CHEST TIGHTNESS: 0
CONSTIPATION: 0
ABDOMINAL DISTENTION: 0
FACIAL SWELLING: 0
RHINORRHEA: 0

## 2023-05-23 NOTE — PROGRESS NOTES
Al Smith (:  1955) is a 80 y.o. female,Established patient, here for evaluation of the following chief complaint(s):  Otalgia (Rt earlobe small lumps / some discomfort ) and Check-Up         ASSESSMENT/PLAN:  1. Severe obesity (BMI 35.0-39. 9) with comorbidity ([facility] [address]  -     Comprehensive Metabolic Panel; Future  -     LIPID PANEL; Future  -     CBC with Auto Differential; Future    Patient is trying to watch her diet and try to lose some weight. 2. Stage 3b chronic kidney disease ([facility] [address]  -     Comprehensive Metabolic Panel; Future  -     LIPID PANEL; Future  -     CBC with Auto Differential; Future  Creatinine has been stable with blood work labs 1.7. She follows with Dr. Amena Leon. She had kidney transplant in  in [facility].  3. Bipolar 1 disorder [facility])  -     Comprehensive Metabolic Panel; Future  -     LIPID PANEL; Future  -     CBC with Auto Differential; Future  She has been following closely with Dr. Ni Waldron as her psychiatrist.  She had increase in Pristiq due to problems with her insurance and medication coverage especially the antirejection medications. That was not covered with her new insurance  4. Acquired hypothyroidism  -     Comprehensive Metabolic Panel; Future  -     LIPID PANEL; Future  -     CBC with Auto Differential; Future  We will check a TSH continue Synthroid 50 mcg daily. 5. Mixed hyperlipidemia  -     Comprehensive Metabolic Panel; Future  -     LIPID PANEL; Future  -     CBC with Auto Differential; Future  Continue simvastatin 20 mg daily and check lipid profile. 6. Age-related osteoporosis without current pathological fracture  -     Comprehensive Metabolic Panel; Future  -     LIPID PANEL; Future  -     CBC with Auto Differential; Future  -     Vitamin D 25 Hydroxy; Future  Osteoporosis in the right hip with DEXA scan done in . I was going to start her on bisphosphonates. Discussed side effects.   I am reluctant to to start her since she has

## 2023-06-19 RX ORDER — LEVOTHYROXINE SODIUM 0.05 MG/1
50 TABLET ORAL DAILY
Qty: 90 TABLET | Refills: 1 | Status: SHIPPED | OUTPATIENT
Start: 2023-06-19

## 2023-06-19 NOTE — TELEPHONE ENCOUNTER
----- Message from April Nagle sent at 6/19/2023  9:38 AM EDT -----  Subject: Refill Request    QUESTIONS  Name of Medication? levothyroxine (SYNTHROID) 50 MCG tablet  Patient-reported dosage and instructions? Take 1 tablet by mouth daily  How many days do you have left? 3  Preferred Pharmacy? [address] [id number]  Pharmacy phone number (if available)? [phone]  ---------------------------------------------------------------------------  --------------  CALL BACK INFO  What is the best way for the office to contact you? OK to leave message on   voicemail  Preferred Call Back Phone Number? [phone]  ---------------------------------------------------------------------------  --------------  SCRIPT ANSWERS  Relationship to Patient?  Self

## 2023-06-22 ENCOUNTER — OFFICE VISIT (OUTPATIENT)
Dept: ONCOLOGY | Age: 68
End: 2023-06-22
Payer: MEDICARE

## 2023-06-22 ENCOUNTER — HOSPITAL ENCOUNTER (OUTPATIENT)
Dept: INFUSION THERAPY | Age: 68
Discharge: HOME | End: 2023-06-22
Payer: MEDICARE

## 2023-06-22 VITALS
HEART RATE: 84 BPM | BODY MASS INDEX: 37.01 KG/M2 | TEMPERATURE: 96.9 F | WEIGHT: 201.1 LBS | HEIGHT: 62 IN | OXYGEN SATURATION: 95 % | DIASTOLIC BLOOD PRESSURE: 79 MMHG | SYSTOLIC BLOOD PRESSURE: 123 MMHG

## 2023-06-22 DIAGNOSIS — D68.9 CLOTTING DISORDER (HCC): Primary | ICD-10-CM

## 2023-06-22 DIAGNOSIS — D68.9 CLOTTING DISORDER (HCC): ICD-10-CM

## 2023-06-22 LAB — SPECIMEN SOURCE: NORMAL

## 2023-06-22 PROCEDURE — 85306 CLOT INHIBIT PROT S FREE: CPT

## 2023-06-22 PROCEDURE — 3078F DIAST BP <80 MM HG: CPT | Performed by: INTERNAL MEDICINE

## 2023-06-22 PROCEDURE — 86147 CARDIOLIPIN ANTIBODY EA IG: CPT

## 2023-06-22 PROCEDURE — G8417 CALC BMI ABV UP PARAM F/U: HCPCS | Performed by: INTERNAL MEDICINE

## 2023-06-22 PROCEDURE — 1090F PRES/ABSN URINE INCON ASSESS: CPT | Performed by: INTERNAL MEDICINE

## 2023-06-22 PROCEDURE — 86146 BETA-2 GLYCOPROTEIN ANTIBODY: CPT

## 2023-06-22 PROCEDURE — 3074F SYST BP LT 130 MM HG: CPT | Performed by: INTERNAL MEDICINE

## 2023-06-22 PROCEDURE — 85613 RUSSELL VIPER VENOM DILUTED: CPT

## 2023-06-22 PROCEDURE — 85303 CLOT INHIBIT PROT C ACTIVITY: CPT

## 2023-06-22 PROCEDURE — 1123F ACP DISCUSS/DSCN MKR DOCD: CPT | Performed by: INTERNAL MEDICINE

## 2023-06-22 PROCEDURE — 81240 F2 GENE: CPT

## 2023-06-22 PROCEDURE — G8399 PT W/DXA RESULTS DOCUMENT: HCPCS | Performed by: INTERNAL MEDICINE

## 2023-06-22 PROCEDURE — 36415 COLL VENOUS BLD VENIPUNCTURE: CPT

## 2023-06-22 PROCEDURE — 1036F TOBACCO NON-USER: CPT | Performed by: INTERNAL MEDICINE

## 2023-06-22 PROCEDURE — G8427 DOCREV CUR MEDS BY ELIG CLIN: HCPCS | Performed by: INTERNAL MEDICINE

## 2023-06-22 PROCEDURE — 99205 OFFICE O/P NEW HI 60 MIN: CPT | Performed by: INTERNAL MEDICINE

## 2023-06-22 PROCEDURE — 85300 ANTITHROMBIN III ACTIVITY: CPT

## 2023-06-22 PROCEDURE — 3017F COLORECTAL CA SCREEN DOC REV: CPT | Performed by: INTERNAL MEDICINE

## 2023-06-22 PROCEDURE — 81241 F5 GENE: CPT

## 2023-06-22 NOTE — PROGRESS NOTES
Dick Valenzuelaa  1955 80 y.o. Referring Physician:     PCP: Tacos Guillen MD    Vitals:    23 0833   BP: 123/79   Pulse: 84   Temp: 96.9 °F (36.1 °C)   SpO2: 95%        Wt Readings from Last 3 Encounters:   23 201 lb 1.6 oz (91.2 kg)   23 202 lb (91.6 kg)   23 202 lb (91.6 kg)        Body mass index is 36.78 kg/m². Chief Complaint:   Chief Complaint   Patient presents with    Follow-up          Cancer Staging   No matching staging information was found for the patient. Prior Radiation Therapy? NO    Concurrent Chemo/radiation? NO    Prior Chemotherapy? NO    Prior Hormonal Therapy? NO    Head and Neck Cancer? No, patient does NOT have HN cancer. LMP: menopause    Age at first Menses: 16    : 2    Para: 2          Current Outpatient Medications:     levothyroxine (SYNTHROID) 50 MCG tablet, Take 1 tablet by mouth daily, Disp: 90 tablet, Rfl: 1    acyclovir (ZOVIRAX) 400 MG tablet, Take 1 tablet by mouth 2 times daily, Disp: 60 tablet, Rfl: 0    apixaban (ELIQUIS) 5 MG TABS tablet, Take 1 tablet by mouth 2 times daily, Disp: 180 tablet, Rfl: 0    metoprolol succinate (TOPROL XL) 25 MG extended release tablet, Take 0.5 tablets by mouth in the morning and 0.5 tablets in the evening., Disp: , Rfl:     vancomycin (VANCOCIN) 125 MG capsule, Take 1 capsule by mouth MON/WED/FRI, Disp: , Rfl:     Probiotic Product (PROBIOTIC DAILY PO), Take by mouth, Disp: , Rfl:     bacitracin-polymyxin b (POLYSPORIN) 500-31175 UNIT/GM ointment, Apply topically 2 times daily Apply topically daily. , Disp: 15 g, Rfl: 0    albuterol (PROVENTIL) (5 MG/ML) 0.5% nebulizer solution, Take 1 mL by nebulization 4 times daily as needed for Wheezing, Disp: 120 each, Rfl: 3    albuterol sulfate HFA (VENTOLIN HFA) 108 (90 Base) MCG/ACT inhaler, Inhale 2 puffs into the lungs 4 times daily as needed for Wheezing, Disp: 54 g, Rfl: 1    allopurinol (ZYLOPRIM) 100 MG tablet, Take 1 tablet by

## 2023-06-22 NOTE — PROGRESS NOTES
[address] [facility] ONCOLOGY  [facility] [address]  Dept: [phone]  Loc: [phone]  Attending Consult Note      Reason for Visit: Left lower extremity DVT. Referring Physician:  Johnathon Vu MD    PCP:  Johnathon Vu MD     Mrs. 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 The patient has a history of left lower extremity DVT in  following , on 2022 the patient was diagnosed with incompletely occlusive thrombi in the mid and distal left femoral vein and within the popliteal vein, this was new compared with ultrasound from , this occurred in the setting of decreased mobility and hospitalization. The patient is on Eliquis, she was seen by cardiology, she was told from cardiac standpoint she does not need to be on anticoagulation. Family history is negative for DVTs, the patient had 1 miscarriage. Review of Systems;  CONSTITUTIONAL: No fever, chills. Fair appetite, positive for fatigue. ENMT: Eyes: No diplopia; Nose: No epistaxis. Mouth: No sore throat. RESPIRATORY: No hemoptysis, shortness of breath, cough. CARDIOVASCULAR: No chest pain, palpitations. GASTROINTESTINAL: No nausea/vomiting, abdominal pain, diarrhea/constipation. GENITOURINARY: No dysuria, urinary frequency, hematuria. NEURO: No syncope, presyncope, headache.   Remainder:  ROS NEGATIVE    Past Medical History:      Diagnosis Date    Abnormal EKG     left bundle branch block    Acute cystitis without hematuria 2023    Acute cystitis without hematuria 2023    Acute exacerbation of chronic obstructive pulmonary disease (COPD) ([facility] [address] 2022

## 2023-06-23 LAB
AT-III ACTIVITY: 119 % ACTIVITY (ref 83–121)
LUPUS ANTICOAG DVVT: ABNORMAL
PROTEIN C ACTIVITY: 111 % ACTIVITY (ref 68–165)

## 2023-06-24 LAB
B2 GLYCOPROT1 IGG SERPL IA-ACNC: <10 SGU
B2 GLYCOPROT1 IGM SERPL IA-ACNC: 13 SMU
CARDIOLIPIN IGA SER IA-ACNC: <10 APL
CARDIOLIPIN IGG SER IA-ACNC: <10 GPL
CARDIOLIPIN IGM SER IA-ACNC: <10 MPL
PROT S FREE AG ACT/NOR PPP IA: 147 % (ref 55–123)

## 2023-06-26 LAB
F2 C.20210G>A GENO BLD/T: NEGATIVE
F5 P.R506Q BLD/T QL: NEGATIVE
SPECIMEN SOURCE: NORMAL
SPECIMEN SOURCE: NORMAL

## 2023-06-26 RX ORDER — ALLOPURINOL 100 MG/1
TABLET ORAL
Qty: 90 TABLET | Refills: 1 | Status: SHIPPED | OUTPATIENT
Start: 2023-06-26

## 2023-07-05 ENCOUNTER — HOSPITAL ENCOUNTER (OUTPATIENT)
Dept: ULTRASOUND IMAGING | Age: 68
Discharge: HOME | End: 2023-07-05
Attending: INTERNAL MEDICINE
Payer: MEDICARE

## 2023-07-05 ENCOUNTER — APPOINTMENT (OUTPATIENT)
Dept: ULTRASOUND IMAGING | Age: 68
End: 2023-07-05
Attending: INTERNAL MEDICINE
Payer: MEDICARE

## 2023-07-05 DIAGNOSIS — Z86.718 H/O DEEP VENOUS THROMBOSIS: ICD-10-CM

## 2023-07-05 PROCEDURE — 93970 EXTREMITY STUDY: CPT

## 2023-07-06 ENCOUNTER — TELEPHONE (OUTPATIENT)
Dept: INTERNAL MEDICINE CLINIC | Age: 68
End: 2023-07-06

## 2023-07-06 NOTE — TELEPHONE ENCOUNTER
Patient needs refill for    Acyclovir 400 mg - BID  30 day supply    [facility] on [address]

## 2023-07-07 RX ORDER — ACYCLOVIR 400 MG/1
400 TABLET ORAL 2 TIMES DAILY
Qty: 60 TABLET | Refills: 0 | Status: SHIPPED | OUTPATIENT
Start: 2023-07-07 | End: 2023-08-06

## 2023-07-07 NOTE — TELEPHONE ENCOUNTER
Notify patient that ultrasound done of the lower extremity was negative for DVT.     Dr. Jameson Valdez

## 2023-07-22 ENCOUNTER — APPOINTMENT (OUTPATIENT)
Dept: GENERAL RADIOLOGY | Age: 68
DRG: 291 | End: 2023-07-22
Payer: MEDICARE

## 2023-07-22 ENCOUNTER — HOSPITAL ENCOUNTER (INPATIENT)
Age: 68
LOS: 10 days | Discharge: ANOTHER HOSPITAL | DRG: 291 | End: 2023-08-01
Attending: EMERGENCY MEDICINE | Admitting: FAMILY MEDICINE
Payer: MEDICARE

## 2023-07-22 DIAGNOSIS — I50.33 DIASTOLIC CHF, ACUTE ON CHRONIC (HCC): ICD-10-CM

## 2023-07-22 DIAGNOSIS — R09.02 HYPOXIA: Primary | ICD-10-CM

## 2023-07-22 PROBLEM — A04.71 RECURRENT COLITIS DUE TO CLOSTRIDIOIDES DIFFICILE: Status: RESOLVED | Noted: 2022-09-15 | Resolved: 2023-07-22

## 2023-07-22 PROBLEM — E66.09 NON MORBID OBESITY DUE TO EXCESS CALORIES: Status: RESOLVED | Noted: 2017-06-27 | Resolved: 2023-07-22

## 2023-07-22 PROBLEM — R41.82 ALTERED MENTAL STATUS: Status: RESOLVED | Noted: 2022-01-21 | Resolved: 2023-07-22

## 2023-07-22 PROBLEM — I50.9 NEW ONSET OF CONGESTIVE HEART FAILURE (HCC): Status: ACTIVE | Noted: 2023-07-22

## 2023-07-22 PROBLEM — I50.9 NEW ONSET OF CONGESTIVE HEART FAILURE (HCC): Status: RESOLVED | Noted: 2023-07-22 | Resolved: 2023-07-22

## 2023-07-22 LAB
ALBUMIN SERPL-MCNC: 4.4 G/DL (ref 3.5–5.2)
ALP SERPL-CCNC: 73 U/L (ref 35–104)
ALT SERPL-CCNC: 8 U/L (ref 0–32)
ANION GAP SERPL CALCULATED.3IONS-SCNC: 15 MMOL/L (ref 7–16)
AST SERPL-CCNC: 12 U/L (ref 0–31)
BASOPHILS # BLD: 0.03 K/UL (ref 0–0.2)
BASOPHILS NFR BLD: 0 % (ref 0–2)
BILIRUB SERPL-MCNC: 0.5 MG/DL (ref 0–1.2)
BNP SERPL-MCNC: 3362 PG/ML (ref 0–450)
BUN SERPL-MCNC: 15 MG/DL (ref 6–23)
CALCIUM SERPL-MCNC: 11.2 MG/DL (ref 8.6–10.2)
CHLORIDE SERPL-SCNC: 98 MMOL/L (ref 98–107)
CO2 SERPL-SCNC: 24 MMOL/L (ref 22–29)
CREAT SERPL-MCNC: 1.6 MG/DL (ref 0.5–1)
D DIMER: <200 NG/ML DDU (ref 0–232)
EOSINOPHIL # BLD: 0.06 K/UL (ref 0.05–0.5)
EOSINOPHILS RELATIVE PERCENT: 1 % (ref 0–6)
ERYTHROCYTE [DISTWIDTH] IN BLOOD BY AUTOMATED COUNT: 14.5 % (ref 11.5–15)
GFR SERPL CREATININE-BSD FRML MDRD: 36 ML/MIN/1.73M2
GLUCOSE SERPL-MCNC: 123 MG/DL (ref 74–99)
HCT VFR BLD AUTO: 41 % (ref 34–48)
HGB BLD-MCNC: 13 G/DL (ref 11.5–15.5)
IMM GRANULOCYTES # BLD AUTO: 0.09 K/UL (ref 0–0.58)
IMM GRANULOCYTES NFR BLD: 1 % (ref 0–5)
LYMPHOCYTES NFR BLD: 0.87 K/UL (ref 1.5–4)
LYMPHOCYTES RELATIVE PERCENT: 9 % (ref 20–42)
MCH RBC QN AUTO: 34.3 PG (ref 26–35)
MCHC RBC AUTO-ENTMCNC: 31.7 G/DL (ref 32–34.5)
MCV RBC AUTO: 108.2 FL (ref 80–99.9)
MONOCYTES NFR BLD: 0.53 K/UL (ref 0.1–0.95)
MONOCYTES NFR BLD: 6 % (ref 2–12)
NEUTROPHILS NFR BLD: 84 % (ref 43–80)
NEUTS SEG NFR BLD: 7.97 K/UL (ref 1.8–7.3)
PLATELET # BLD AUTO: 169 K/UL (ref 130–450)
PMV BLD AUTO: 13 FL (ref 7–12)
POTASSIUM SERPL-SCNC: 4.7 MMOL/L (ref 3.5–5)
PROT SERPL-MCNC: 7.1 G/DL (ref 6.4–8.3)
RBC # BLD AUTO: 3.79 M/UL (ref 3.5–5.5)
SARS-COV-2 RDRP RESP QL NAA+PROBE: NOT DETECTED
SODIUM SERPL-SCNC: 137 MMOL/L (ref 132–146)
SPECIMEN DESCRIPTION: NORMAL
T4 FREE SERPL-MCNC: 1.5 NG/DL (ref 0.9–1.7)
TROPONIN I SERPL HS-MCNC: 29 NG/L (ref 0–9)
TSH SERPL DL<=0.05 MIU/L-ACNC: 0.3 UIU/ML (ref 0.27–4.2)
WBC OTHER # BLD: 9.6 K/UL (ref 4.5–11.5)

## 2023-07-22 PROCEDURE — 2580000003 HC RX 258: Performed by: FAMILY MEDICINE

## 2023-07-22 PROCEDURE — 83880 ASSAY OF NATRIURETIC PEPTIDE: CPT

## 2023-07-22 PROCEDURE — 6370000000 HC RX 637 (ALT 250 FOR IP)

## 2023-07-22 PROCEDURE — 84484 ASSAY OF TROPONIN QUANT: CPT

## 2023-07-22 PROCEDURE — 84443 ASSAY THYROID STIM HORMONE: CPT

## 2023-07-22 PROCEDURE — 99223 1ST HOSP IP/OBS HIGH 75: CPT | Performed by: FAMILY MEDICINE

## 2023-07-22 PROCEDURE — 71046 X-RAY EXAM CHEST 2 VIEWS: CPT

## 2023-07-22 PROCEDURE — 94664 DEMO&/EVAL PT USE INHALER: CPT

## 2023-07-22 PROCEDURE — 80053 COMPREHEN METABOLIC PANEL: CPT

## 2023-07-22 PROCEDURE — 85027 COMPLETE CBC AUTOMATED: CPT

## 2023-07-22 PROCEDURE — 87635 SARS-COV-2 COVID-19 AMP PRB: CPT

## 2023-07-22 PROCEDURE — 93005 ELECTROCARDIOGRAM TRACING: CPT

## 2023-07-22 PROCEDURE — 94640 AIRWAY INHALATION TREATMENT: CPT

## 2023-07-22 PROCEDURE — 2060000000 HC ICU INTERMEDIATE R&B

## 2023-07-22 PROCEDURE — 36415 COLL VENOUS BLD VENIPUNCTURE: CPT

## 2023-07-22 PROCEDURE — 99285 EMERGENCY DEPT VISIT HI MDM: CPT

## 2023-07-22 PROCEDURE — 6360000002 HC RX W HCPCS

## 2023-07-22 PROCEDURE — 85379 FIBRIN DEGRADATION QUANT: CPT

## 2023-07-22 PROCEDURE — 84439 ASSAY OF FREE THYROXINE: CPT

## 2023-07-22 RX ORDER — SODIUM CHLORIDE 0.9 % (FLUSH) 0.9 %
5-40 SYRINGE (ML) INJECTION PRN
Status: DISCONTINUED | OUTPATIENT
Start: 2023-07-22 | End: 2023-08-01 | Stop reason: HOSPADM

## 2023-07-22 RX ORDER — ACETAMINOPHEN 650 MG/1
650 SUPPOSITORY RECTAL EVERY 6 HOURS PRN
Status: DISCONTINUED | OUTPATIENT
Start: 2023-07-22 | End: 2023-08-01 | Stop reason: HOSPADM

## 2023-07-22 RX ORDER — BENZONATATE 100 MG/1
CAPSULE ORAL 3 TIMES DAILY PRN
COMMUNITY

## 2023-07-22 RX ORDER — LEVOTHYROXINE SODIUM 0.05 MG/1
50 TABLET ORAL DAILY
Status: DISCONTINUED | OUTPATIENT
Start: 2023-07-23 | End: 2023-08-01 | Stop reason: HOSPADM

## 2023-07-22 RX ORDER — BUDESONIDE 0.5 MG/2ML
0.5 INHALANT ORAL
Status: DISCONTINUED | OUTPATIENT
Start: 2023-07-22 | End: 2023-08-01 | Stop reason: HOSPADM

## 2023-07-22 RX ORDER — IPRATROPIUM BROMIDE AND ALBUTEROL SULFATE 2.5; .5 MG/3ML; MG/3ML
1 SOLUTION RESPIRATORY (INHALATION)
Status: DISCONTINUED | OUTPATIENT
Start: 2023-07-23 | End: 2023-07-25

## 2023-07-22 RX ORDER — CALCITRIOL 0.25 UG/1
0.25 CAPSULE, LIQUID FILLED ORAL
Status: DISCONTINUED | OUTPATIENT
Start: 2023-07-24 | End: 2023-08-01 | Stop reason: HOSPADM

## 2023-07-22 RX ORDER — SODIUM CHLORIDE 9 MG/ML
INJECTION, SOLUTION INTRAVENOUS PRN
Status: DISCONTINUED | OUTPATIENT
Start: 2023-07-22 | End: 2023-08-01 | Stop reason: HOSPADM

## 2023-07-22 RX ORDER — METOPROLOL SUCCINATE 25 MG/1
12.5 TABLET, EXTENDED RELEASE ORAL
Status: DISCONTINUED | OUTPATIENT
Start: 2023-07-22 | End: 2023-08-01 | Stop reason: HOSPADM

## 2023-07-22 RX ORDER — DESVENLAFAXINE SUCCINATE 50 MG/1
100 TABLET, EXTENDED RELEASE ORAL EVERY EVENING
Status: DISCONTINUED | OUTPATIENT
Start: 2023-07-22 | End: 2023-07-29 | Stop reason: SDUPTHER

## 2023-07-22 RX ORDER — AZITHROMYCIN 250 MG/1
250 TABLET, FILM COATED ORAL DAILY
Status: DISCONTINUED | OUTPATIENT
Start: 2023-07-23 | End: 2023-07-24

## 2023-07-22 RX ORDER — BENZONATATE 100 MG/1
200 CAPSULE ORAL 3 TIMES DAILY PRN
Status: DISCONTINUED | OUTPATIENT
Start: 2023-07-22 | End: 2023-08-01 | Stop reason: HOSPADM

## 2023-07-22 RX ORDER — PREDNISONE 20 MG/1
40 TABLET ORAL DAILY
Status: DISCONTINUED | OUTPATIENT
Start: 2023-07-23 | End: 2023-07-25

## 2023-07-22 RX ORDER — MYCOPHENOLATE MOFETIL 500 MG/1
1000 TABLET ORAL 2 TIMES DAILY
Status: DISCONTINUED | OUTPATIENT
Start: 2023-07-22 | End: 2023-07-22 | Stop reason: CLARIF

## 2023-07-22 RX ORDER — SODIUM CHLORIDE 0.9 % (FLUSH) 0.9 %
5-40 SYRINGE (ML) INJECTION EVERY 12 HOURS SCHEDULED
Status: DISCONTINUED | OUTPATIENT
Start: 2023-07-22 | End: 2023-08-01 | Stop reason: HOSPADM

## 2023-07-22 RX ORDER — CYCLOSPORINE 25 MG/1
100 CAPSULE, GELATIN COATED ORAL 2 TIMES DAILY
Status: DISCONTINUED | OUTPATIENT
Start: 2023-07-22 | End: 2023-08-01 | Stop reason: HOSPADM

## 2023-07-22 RX ORDER — ENOXAPARIN SODIUM 100 MG/ML
40 INJECTION SUBCUTANEOUS DAILY
Status: DISCONTINUED | OUTPATIENT
Start: 2023-07-23 | End: 2023-07-22

## 2023-07-22 RX ORDER — ARFORMOTEROL TARTRATE 15 UG/2ML
15 SOLUTION RESPIRATORY (INHALATION)
Status: DISCONTINUED | OUTPATIENT
Start: 2023-07-22 | End: 2023-08-01 | Stop reason: HOSPADM

## 2023-07-22 RX ORDER — AZITHROMYCIN 250 MG/1
250 TABLET, FILM COATED ORAL DAILY
Status: ON HOLD | COMMUNITY
End: 2023-08-01 | Stop reason: HOSPADM

## 2023-07-22 RX ORDER — ALLOPURINOL 100 MG/1
100 TABLET ORAL DAILY
Status: DISCONTINUED | OUTPATIENT
Start: 2023-07-23 | End: 2023-08-01 | Stop reason: HOSPADM

## 2023-07-22 RX ORDER — MYCOPHENOLATE MOFETIL 250 MG/1
1000 CAPSULE ORAL 2 TIMES DAILY
Status: DISCONTINUED | OUTPATIENT
Start: 2023-07-22 | End: 2023-08-01 | Stop reason: HOSPADM

## 2023-07-22 RX ORDER — IPRATROPIUM BROMIDE AND ALBUTEROL SULFATE 2.5; .5 MG/3ML; MG/3ML
3 SOLUTION RESPIRATORY (INHALATION) ONCE
Status: COMPLETED | OUTPATIENT
Start: 2023-07-22 | End: 2023-07-22

## 2023-07-22 RX ORDER — ACETAMINOPHEN 325 MG/1
650 TABLET ORAL EVERY 6 HOURS PRN
Status: DISCONTINUED | OUTPATIENT
Start: 2023-07-22 | End: 2023-08-01 | Stop reason: HOSPADM

## 2023-07-22 RX ORDER — FUROSEMIDE 10 MG/ML
40 INJECTION INTRAMUSCULAR; INTRAVENOUS ONCE
Status: COMPLETED | OUTPATIENT
Start: 2023-07-22 | End: 2023-07-22

## 2023-07-22 RX ORDER — FOLIC ACID 1 MG/1
1 TABLET ORAL DAILY
Status: DISCONTINUED | OUTPATIENT
Start: 2023-07-23 | End: 2023-08-01 | Stop reason: HOSPADM

## 2023-07-22 RX ORDER — ACYCLOVIR 400 MG/1
400 TABLET ORAL 2 TIMES DAILY
Status: DISCONTINUED | OUTPATIENT
Start: 2023-07-22 | End: 2023-08-01 | Stop reason: HOSPADM

## 2023-07-22 RX ORDER — LISINOPRIL 5 MG/1
5 TABLET ORAL DAILY
Status: DISCONTINUED | OUTPATIENT
Start: 2023-07-23 | End: 2023-08-01 | Stop reason: HOSPADM

## 2023-07-22 RX ORDER — IPRATROPIUM BROMIDE AND ALBUTEROL SULFATE 2.5; .5 MG/3ML; MG/3ML
1 SOLUTION RESPIRATORY (INHALATION) EVERY 4 HOURS PRN
Status: DISCONTINUED | OUTPATIENT
Start: 2023-07-22 | End: 2023-07-25

## 2023-07-22 RX ORDER — ATORVASTATIN CALCIUM 10 MG/1
10 TABLET, FILM COATED ORAL DAILY
Status: DISCONTINUED | OUTPATIENT
Start: 2023-07-23 | End: 2023-08-01 | Stop reason: HOSPADM

## 2023-07-22 RX ORDER — FUROSEMIDE 10 MG/ML
40 INJECTION INTRAMUSCULAR; INTRAVENOUS 2 TIMES DAILY
Status: DISCONTINUED | OUTPATIENT
Start: 2023-07-23 | End: 2023-07-24

## 2023-07-22 RX ADMIN — Medication 10 ML: at 21:31

## 2023-07-22 RX ADMIN — FUROSEMIDE 40 MG: 10 INJECTION, SOLUTION INTRAMUSCULAR; INTRAVENOUS at 21:31

## 2023-07-22 RX ADMIN — IPRATROPIUM BROMIDE AND ALBUTEROL SULFATE 3 DOSE: .5; 2.5 SOLUTION RESPIRATORY (INHALATION) at 19:17

## 2023-07-22 ASSESSMENT — PAIN DESCRIPTION - PAIN TYPE: TYPE: ACUTE PAIN

## 2023-07-22 ASSESSMENT — PAIN - FUNCTIONAL ASSESSMENT: PAIN_FUNCTIONAL_ASSESSMENT: 0-10

## 2023-07-22 NOTE — ED PROVIDER NOTES
BRAIN NATRIURETIC PEPTIDE - Abnormal; Notable for the following components:    Pro-BNP 3,362 (*)     All other components within normal limits   LIPID PANEL - Abnormal; Notable for the following components:    LDL Cholesterol 104 (*)     All other components within normal limits   BASIC METABOLIC PANEL - Abnormal; Notable for the following components:    Glucose 110 (*)     Creatinine 1.7 (*)     Est, Glom Filt Rate 33 (*)     Calcium 10.6 (*)     Chloride 97 (*)     CO2 30 (*)     All other components within normal limits   COVID-19, RAPID   D-DIMER, QUANTITATIVE   TSH   T4, FREE   MAGNESIUM       As interpreted by me, the above displayed labs are abnormal. All other labs obtained during this visit were within normal range or not returned as of this dictation. RADIOLOGY:   Unless a wet read is documented in the ED course or MDM, non-plain film images such as CT, Ultrasound and MRI are read by the radiologist unless otherwise specified in the medical decision-making. Plain radiographic images are preliminarily interpreted by this ED Provider with the findings documented in the ED Course with official radiology read to follow. Interpretation per the Radiologist below, if available at the time of this note:    XR CHEST (2 VW)   Final Result   Mild pulmonary vascular congestion. Stable mild cardiac enlargement. No results found. No results found.       PAST MEDICAL HISTORY/Chronic Conditions Affecting Care      has a past medical history of Abnormal EKG, Acute cystitis without hematuria (01/20/2023), Acute cystitis without hematuria (01/20/2023), Acute exacerbation of chronic obstructive pulmonary disease (COPD) ([address]) (05/19/2022), Acute gout involving toe of right foot (09/03/2020), Acute on chronic combined systolic (congestive) and diastolic (congestive) heart failure ([address]), asthmatic bronchitis (03/07/2019), Bipolar disorder ([address]) (11/26/2018), Cancer ([address]), Cerebrovascular disease,

## 2023-07-22 NOTE — ED NOTES
Patient did not tolerate trial off o2 nasal cannula. Patient spo2 dropped to 83%. Patient placed back on 2L NC, spo2 improved to 92%.      Oniel Whittaker RN  07/22/23 4023

## 2023-07-23 LAB
ANION GAP SERPL CALCULATED.3IONS-SCNC: 11 MMOL/L (ref 7–16)
BUN SERPL-MCNC: 17 MG/DL (ref 6–23)
CALCIUM SERPL-MCNC: 10.6 MG/DL (ref 8.6–10.2)
CHLORIDE SERPL-SCNC: 97 MMOL/L (ref 98–107)
CHOLEST SERPL-MCNC: 188 MG/DL
CO2 SERPL-SCNC: 30 MMOL/L (ref 22–29)
CREAT SERPL-MCNC: 1.7 MG/DL (ref 0.5–1)
EKG ATRIAL RATE: 84 BPM
EKG P AXIS: 47 DEGREES
EKG P-R INTERVAL: 164 MS
EKG Q-T INTERVAL: 406 MS
EKG QRS DURATION: 150 MS
EKG QTC CALCULATION (BAZETT): 479 MS
EKG R AXIS: -65 DEGREES
EKG T AXIS: 93 DEGREES
EKG VENTRICULAR RATE: 84 BPM
GFR SERPL CREATININE-BSD FRML MDRD: 33 ML/MIN/1.73M2
GLUCOSE SERPL-MCNC: 110 MG/DL (ref 74–107)
HDLC SERPL-MCNC: 58 MG/DL
LDLC SERPL CALC-MCNC: 104 MG/DL
MAGNESIUM SERPL-MCNC: 1.7 MG/DL (ref 1.6–2.6)
POTASSIUM SERPL-SCNC: 4.1 MMOL/L (ref 3.5–5)
SODIUM SERPL-SCNC: 138 MMOL/L (ref 132–146)
TRIGL SERPL-MCNC: 129 MG/DL
VLDLC SERPL CALC-MCNC: 26 MG/DL

## 2023-07-23 PROCEDURE — 6360000002 HC RX W HCPCS: Performed by: FAMILY MEDICINE

## 2023-07-23 PROCEDURE — 94640 AIRWAY INHALATION TREATMENT: CPT

## 2023-07-23 PROCEDURE — 2700000000 HC OXYGEN THERAPY PER DAY

## 2023-07-23 PROCEDURE — 99223 1ST HOSP IP/OBS HIGH 75: CPT | Performed by: INTERNAL MEDICINE

## 2023-07-23 PROCEDURE — 82570 ASSAY OF URINE CREATININE: CPT

## 2023-07-23 PROCEDURE — 2580000003 HC RX 258: Performed by: FAMILY MEDICINE

## 2023-07-23 PROCEDURE — 36415 COLL VENOUS BLD VENIPUNCTURE: CPT

## 2023-07-23 PROCEDURE — 84156 ASSAY OF PROTEIN URINE: CPT

## 2023-07-23 PROCEDURE — 80061 LIPID PANEL: CPT

## 2023-07-23 PROCEDURE — 80048 BASIC METABOLIC PNL TOTAL CA: CPT

## 2023-07-23 PROCEDURE — 6370000000 HC RX 637 (ALT 250 FOR IP): Performed by: FAMILY MEDICINE

## 2023-07-23 PROCEDURE — 2060000000 HC ICU INTERMEDIATE R&B

## 2023-07-23 PROCEDURE — 83735 ASSAY OF MAGNESIUM: CPT

## 2023-07-23 PROCEDURE — 99232 SBSQ HOSP IP/OBS MODERATE 35: CPT | Performed by: INTERNAL MEDICINE

## 2023-07-23 PROCEDURE — 93010 ELECTROCARDIOGRAM REPORT: CPT | Performed by: INTERNAL MEDICINE

## 2023-07-23 RX ADMIN — IPRATROPIUM BROMIDE AND ALBUTEROL SULFATE 1 DOSE: .5; 2.5 SOLUTION RESPIRATORY (INHALATION) at 13:53

## 2023-07-23 RX ADMIN — METOPROLOL SUCCINATE 12.5 MG: 25 TABLET, EXTENDED RELEASE ORAL at 21:01

## 2023-07-23 RX ADMIN — NITROGLYCERIN 0.5 INCH: 20 OINTMENT TOPICAL at 13:00

## 2023-07-23 RX ADMIN — IPRATROPIUM BROMIDE AND ALBUTEROL SULFATE 1 DOSE: .5; 2.5 SOLUTION RESPIRATORY (INHALATION) at 06:21

## 2023-07-23 RX ADMIN — APIXABAN 5 MG: 5 TABLET, FILM COATED ORAL at 00:01

## 2023-07-23 RX ADMIN — CYCLOSPORINE 100 MG: 25 CAPSULE, GELATIN COATED ORAL at 00:02

## 2023-07-23 RX ADMIN — APIXABAN 5 MG: 5 TABLET, FILM COATED ORAL at 21:02

## 2023-07-23 RX ADMIN — LEVOTHYROXINE SODIUM 50 MCG: 0.05 TABLET ORAL at 08:20

## 2023-07-23 RX ADMIN — METOPROLOL SUCCINATE 12.5 MG: 25 TABLET, EXTENDED RELEASE ORAL at 08:19

## 2023-07-23 RX ADMIN — NITROGLYCERIN 0.5 INCH: 20 OINTMENT TOPICAL at 00:02

## 2023-07-23 RX ADMIN — MYCOPHENOLATE MOFETIL 1000 MG: 250 CAPSULE ORAL at 08:20

## 2023-07-23 RX ADMIN — MYCOPHENOLATE MOFETIL 1000 MG: 250 CAPSULE ORAL at 00:01

## 2023-07-23 RX ADMIN — IPRATROPIUM BROMIDE AND ALBUTEROL SULFATE 1 DOSE: .5; 2.5 SOLUTION RESPIRATORY (INHALATION) at 17:46

## 2023-07-23 RX ADMIN — ACYCLOVIR 400 MG: 400 TABLET ORAL at 00:02

## 2023-07-23 RX ADMIN — NITROGLYCERIN 0.5 INCH: 20 OINTMENT TOPICAL at 17:49

## 2023-07-23 RX ADMIN — LISINOPRIL 5 MG: 5 TABLET ORAL at 08:18

## 2023-07-23 RX ADMIN — APIXABAN 5 MG: 5 TABLET, FILM COATED ORAL at 08:19

## 2023-07-23 RX ADMIN — AZITHROMYCIN MONOHYDRATE 250 MG: 250 TABLET ORAL at 08:20

## 2023-07-23 RX ADMIN — DESVENLAFAXINE 100 MG: 50 TABLET, EXTENDED RELEASE ORAL at 00:01

## 2023-07-23 RX ADMIN — FUROSEMIDE 40 MG: 10 INJECTION, SOLUTION INTRAMUSCULAR; INTRAVENOUS at 08:20

## 2023-07-23 RX ADMIN — ACYCLOVIR 400 MG: 400 TABLET ORAL at 21:02

## 2023-07-23 RX ADMIN — CYCLOSPORINE 100 MG: 25 CAPSULE, GELATIN COATED ORAL at 21:01

## 2023-07-23 RX ADMIN — ARFORMOTEROL TARTRATE 15 MCG: 15 SOLUTION RESPIRATORY (INHALATION) at 17:46

## 2023-07-23 RX ADMIN — ALLOPURINOL 100 MG: 100 TABLET ORAL at 08:19

## 2023-07-23 RX ADMIN — MYCOPHENOLATE MOFETIL 1000 MG: 250 CAPSULE ORAL at 21:01

## 2023-07-23 RX ADMIN — BUDESONIDE INHALATION 500 MCG: 0.5 SUSPENSION RESPIRATORY (INHALATION) at 06:21

## 2023-07-23 RX ADMIN — FOLIC ACID 1 MG: 1 TABLET ORAL at 08:19

## 2023-07-23 RX ADMIN — ARFORMOTEROL TARTRATE 15 MCG: 15 SOLUTION RESPIRATORY (INHALATION) at 06:21

## 2023-07-23 RX ADMIN — ACYCLOVIR 400 MG: 400 TABLET ORAL at 08:20

## 2023-07-23 RX ADMIN — ATORVASTATIN CALCIUM 10 MG: 20 TABLET, FILM COATED ORAL at 08:20

## 2023-07-23 RX ADMIN — DESVENLAFAXINE 100 MG: 50 TABLET, EXTENDED RELEASE ORAL at 17:50

## 2023-07-23 RX ADMIN — Medication 10 ML: at 21:02

## 2023-07-23 RX ADMIN — IPRATROPIUM BROMIDE AND ALBUTEROL SULFATE 1 DOSE: .5; 2.5 SOLUTION RESPIRATORY (INHALATION) at 10:11

## 2023-07-23 RX ADMIN — NITROGLYCERIN 0.5 INCH: 20 OINTMENT TOPICAL at 05:42

## 2023-07-23 RX ADMIN — Medication 10 ML: at 08:20

## 2023-07-23 RX ADMIN — FUROSEMIDE 40 MG: 10 INJECTION, SOLUTION INTRAMUSCULAR; INTRAVENOUS at 17:50

## 2023-07-23 RX ADMIN — CYCLOSPORINE 100 MG: 25 CAPSULE, GELATIN COATED ORAL at 10:54

## 2023-07-23 RX ADMIN — METOPROLOL SUCCINATE 12.5 MG: 25 TABLET, EXTENDED RELEASE ORAL at 00:01

## 2023-07-23 RX ADMIN — BUDESONIDE INHALATION 500 MCG: 0.5 SUSPENSION RESPIRATORY (INHALATION) at 17:46

## 2023-07-23 RX ADMIN — PREDNISONE 40 MG: 20 TABLET ORAL at 08:20

## 2023-07-23 NOTE — CONSULTS
CHIEF COMPLAINT: Acute CHF    HISTORY OF PRESENT ILLNESS: Patient is a 80 y.o. female seen at the request of Merceda Skiff, MD and followed at our office by Dr. Blackburn. Patient with history of renal transplant, COPD, CHF, hypothyroid, PVD, thyroid cancer presented  with progressive SOB and volume overload. No CP or angina. Currently mild SOB. No CP.      Past Medical History:   Diagnosis Date    Abnormal EKG     left bundle branch block    Acute cystitis without hematuria 01/20/2023    Acute cystitis without hematuria 01/20/2023    Acute exacerbation of chronic obstructive pulmonary disease (COPD) ([address]) 05/19/2022    Acute gout involving toe of right foot 09/03/2020    Acute on chronic combined systolic (congestive) and diastolic (congestive) heart failure (HCC)     asthmatic bronchitis 03/07/2019    Bipolar disorder ([address]) 11/26/2018    Cancer ([address])     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    Cerebrovascular disease     Clotting disorder ([address]) 1985    thrombophlebitis after c section delivery    Depression     15 years followed by dr Peggy Mac    Hemodialysis patient [facility])     History of blood transfusion     History of renal transplant 10/2015     in [facility] [address] [facility]    Hyperlipidemia     Hypertension     Hypothyroidism     Hypothyroidism 11/26/2018    Leg swelling 09/03/2020    Leukocytosis 01/10/2022    Lymphedema of both lower extremities 09/03/2020    Peripheral vascular disease ([address])     Renal failure 11/2012    renal transplant    Sepsis ([address])     2021    Thrombophlebitis     after c section delivery    Thyroid disease        Patient Active Problem List   Diagnosis    Peripheral vascular disease ([address])    Depression    Clotting disorder ([address])    Abnormal EKG    Cancer ([address])    Kidney transplanted    Hypertension    Leg swelling    Lymphedema of both lower extremities    Acute gout involving toe of right foot    Thyroid disease    Diastolic CHF, acute on chronic

## 2023-07-23 NOTE — CONSULTS
CHF NURSE NAVIGATOR CONSULT NOTE:      Patient currently admitted with diagnosis of acute on chronic diastolic heart failure. Patient was awake and alert during the consultation. She was engaged and asked appropriate questions throughout the education session. She is agreeable to followups. Scheduling with the CHF clinic Yes. Future Appointments   Date Time Provider 4600  46[facility]   7/27/2023  8:50  [facility]   7/27/2023  9:30 AM Romelia Bermudez MD [facility]   7/31/2023  9:30 AM [facility] CHF ROOM 2 [facility]   8/7/2023 10:00 AM KATINA Lerner CNP [facility]   8/29/2023  8:15 AM [facility]       Barriers to Care:  Contributing risk factors for Heart Failure are identified as lack of support and lack of education. The patient is ordered:  Diet: ADULT DIET; Regular; Low Sodium (2 gm)   Sodium controlled diet Yes  Fluid restriction daily ordered (fluid restriction recommended if patient is hyponatremic and/or diuretic is initiated or increased) No  FR:   Daily Weights: Patient Vitals for the past 96 hrs (Last 3 readings):   Weight   07/22/23 2245 200 lb (90.7 kg)   07/22/23 1545 200 lb (90.7 kg)     I/O:   Intake/Output Summary (Last 24 hours) at 7/23/2023 1620  Last data filed at 7/23/2023 1331  Gross per 24 hour   Intake 240 ml   Output --   Net 240 ml              We reviewed the introduction to Heart Failure, the HF zones, signs and symptoms to report on day 1 of onset, medications, medication compliance, the importance of obtaining daily weights, following a low sodium diet, reading food labels for the sodium content, keeping physician appointments, and smoking cessation. We discussed writing / tracking daily weights on a calendar / log because a 5 pound gain in 1 week can sneak up if you are not tracking it.           I advised patient they can reduce the risk for Heart Failure exacerbations

## 2023-07-23 NOTE — H&P
7/22/2023 4:40 pm COMPARISON: Chest one view, 04/26/2023; chest two view, 02/23/2023 HISTORY: ORDERING SYSTEM PROVIDED HISTORY: chest congestion TECHNOLOGIST PROVIDED HISTORY: Reason for exam:->chest congestion FINDINGS: Lines, tubes, and devices: None. Lungs and pleura: There is mild pulmonary vascular redistribution. No focal consolidation. No pleural effusion. No pneumothorax. Cardiomediastinal silhouette: The mediastinum is stable. The heart is mildly enlarged. Bones and soft tissues: The bones are osteopenic. Mild pulmonary vascular congestion. Stable mild cardiac enlargement. EKG: sinus rhythm    ASSESSMENT:      Principal Problem:    Diastolic CHF, acute on chronic (HCC)  Active Problems:    Severe obesity (BMI 35.0-39. 9) with comorbidity ([address])    Stage 3b chronic kidney disease ([address])    Mixed hyperlipidemia    Acquired hypothyroidism    Bipolar 1 disorder (HCC)    Peripheral vascular disease (HCC)    Kidney transplanted    Hypertension    Leg swelling    Thyroid disease    Hypoxia    History of DVT (deep vein thrombosis)    Chronic anticoagulation  Resolved Problems:    * No resolved hospital problems.  *      PLAN:    Acute CHF exacerbation  Acute hypoxic respiratory failure  - last echo 2/28/22 with EF 60-65% up from 35% December 4839  - diastolic heart failure, recovered EF  - wean O2 as tolerated  - cardiology c/s  - lasix  - echo    Acute COPD exacerbation  - steroid burst  - zithromax  - brovana/pulmicort  - duonebs    CKD 3b  Renal transplant in 2015  Hypercalcemia  - avoid nephrotoxic agents  - creatinine at baseline  - continue rocaltrol  - hold home vitamin D  - continue home cellcept/cyclosporine    Hypothyroid  - continue home synthroid  - TSH/free T4 WNL    HTN  HLD  - continue home statin, antihypertensive    Bipolar  - continue home psych meds    Morbid obesity  - recommend lifestyle modicication    Prior LLE DVT  - on Eliquis, saw hematology who wanted B/L LE U/S to determine

## 2023-07-23 NOTE — DISCHARGE INSTRUCTIONS
whether you need another dose. My Goal for Self-management of Heart Failure Includes 5 steps :    1. Notice a change in symptoms ( weight gain, short of breath, leg swelling, decreased activity level, bloating. ...)    2. Evaluate the change: (use the Heart Failure Zones )     3. Decide to take action: decide what your options are, such as: (call your doctor for an extra visit, take a prescribed medication, such as your water pill if your doctor has given you directions to do so, [facility])    4. Come up with a strategy:  (now you call the doctor for advice / appointment. This is where you take action!!! Do not wait, catch the symptom early and treat it before it worsens. 5. Evaluate the response: The next day, check your Heart Failure Zones: are you in the GREEN ZONE (safe zone)? Worsening symptoms of YELLOW ZONE? Or have you moved to the RED ZONE and need to call 911 or go to the Emergency Room for evaluation? Call your doctor's office to update them on your symptoms of heart failure.

## 2023-07-24 LAB
ALBUMIN SERPL-MCNC: 3.8 G/DL (ref 3.5–5.2)
ALP SERPL-CCNC: 66 U/L (ref 35–104)
ALT SERPL-CCNC: 7 U/L (ref 0–32)
ANION GAP SERPL CALCULATED.3IONS-SCNC: 12 MMOL/L (ref 7–16)
AST SERPL-CCNC: 9 U/L (ref 0–31)
BILIRUB SERPL-MCNC: 0.5 MG/DL (ref 0–1.2)
BUN SERPL-MCNC: 31 MG/DL (ref 6–23)
CALCIUM SERPL-MCNC: 10.4 MG/DL (ref 8.6–10.2)
CHLORIDE SERPL-SCNC: 96 MMOL/L (ref 98–107)
CHLORIDE UR-SCNC: 25 MMOL/L
CO2 SERPL-SCNC: 29 MMOL/L (ref 22–29)
CREAT SERPL-MCNC: 2.3 MG/DL (ref 0.5–1)
CREAT UR-MCNC: 131.6 MG/DL (ref 29–226)
ERYTHROCYTE [DISTWIDTH] IN BLOOD BY AUTOMATED COUNT: 14.6 % (ref 11.5–15)
GFR SERPL CREATININE-BSD FRML MDRD: 23 ML/MIN/1.73M2
GLUCOSE SERPL-MCNC: 105 MG/DL (ref 74–99)
HCT VFR BLD AUTO: 40.7 % (ref 34–48)
HGB BLD-MCNC: 12.4 G/DL (ref 11.5–15.5)
LEFT VENTRICULAR EJECTION FRACTION HIGH VALUE: 55 %
LEFT VENTRICULAR EJECTION FRACTION MODE: NORMAL
LV EF: 55 %
LVEF MODALITY: NORMAL
MAGNESIUM SERPL-MCNC: 2.2 MG/DL (ref 1.6–2.6)
MCH RBC QN AUTO: 34.4 PG (ref 26–35)
MCHC RBC AUTO-ENTMCNC: 30.5 G/DL (ref 32–34.5)
MCV RBC AUTO: 113.1 FL (ref 80–99.9)
OSMOLALITY UR: 309 MOSM/KG (ref 300–900)
PHOSPHATE SERPL-MCNC: 5.7 MG/DL (ref 2.5–4.5)
PLATELET # BLD AUTO: 144 K/UL (ref 130–450)
PMV BLD AUTO: 12.9 FL (ref 7–12)
POTASSIUM SERPL-SCNC: 4.6 MMOL/L (ref 3.5–5)
PROT SERPL-MCNC: 6.7 G/DL (ref 6.4–8.3)
RBC # BLD AUTO: 3.6 M/UL (ref 3.5–5.5)
SODIUM SERPL-SCNC: 137 MMOL/L (ref 132–146)
SODIUM UR-SCNC: 34 MMOL/L
TOTAL PROTEIN, URINE: 17 MG/DL (ref 0–12)
URINE TOTAL PROTEIN CREATININE RATIO: 0.13 (ref 0–0.2)
WBC OTHER # BLD: 11.5 K/UL (ref 4.5–11.5)

## 2023-07-24 PROCEDURE — 84100 ASSAY OF PHOSPHORUS: CPT

## 2023-07-24 PROCEDURE — 80053 COMPREHEN METABOLIC PANEL: CPT

## 2023-07-24 PROCEDURE — 83735 ASSAY OF MAGNESIUM: CPT

## 2023-07-24 PROCEDURE — 6360000002 HC RX W HCPCS: Performed by: INTERNAL MEDICINE

## 2023-07-24 PROCEDURE — 6370000000 HC RX 637 (ALT 250 FOR IP): Performed by: FAMILY MEDICINE

## 2023-07-24 PROCEDURE — 85027 COMPLETE CBC AUTOMATED: CPT

## 2023-07-24 PROCEDURE — 94640 AIRWAY INHALATION TREATMENT: CPT

## 2023-07-24 PROCEDURE — 99233 SBSQ HOSP IP/OBS HIGH 50: CPT | Performed by: INTERNAL MEDICINE

## 2023-07-24 PROCEDURE — 6360000002 HC RX W HCPCS: Performed by: FAMILY MEDICINE

## 2023-07-24 PROCEDURE — 83935 ASSAY OF URINE OSMOLALITY: CPT

## 2023-07-24 PROCEDURE — 97165 OT EVAL LOW COMPLEX 30 MIN: CPT | Performed by: OCCUPATIONAL THERAPIST

## 2023-07-24 PROCEDURE — 0202U NFCT DS 22 TRGT SARS-COV-2: CPT

## 2023-07-24 PROCEDURE — 6370000000 HC RX 637 (ALT 250 FOR IP): Performed by: INTERNAL MEDICINE

## 2023-07-24 PROCEDURE — 97535 SELF CARE MNGMENT TRAINING: CPT | Performed by: OCCUPATIONAL THERAPIST

## 2023-07-24 PROCEDURE — 82436 ASSAY OF URINE CHLORIDE: CPT

## 2023-07-24 PROCEDURE — 84300 ASSAY OF URINE SODIUM: CPT

## 2023-07-24 PROCEDURE — 2580000003 HC RX 258: Performed by: FAMILY MEDICINE

## 2023-07-24 PROCEDURE — 2700000000 HC OXYGEN THERAPY PER DAY

## 2023-07-24 PROCEDURE — 93306 TTE W/DOPPLER COMPLETE: CPT

## 2023-07-24 PROCEDURE — 97161 PT EVAL LOW COMPLEX 20 MIN: CPT | Performed by: PHYSICAL THERAPIST

## 2023-07-24 PROCEDURE — 97530 THERAPEUTIC ACTIVITIES: CPT | Performed by: OCCUPATIONAL THERAPIST

## 2023-07-24 PROCEDURE — 36415 COLL VENOUS BLD VENIPUNCTURE: CPT

## 2023-07-24 PROCEDURE — 2060000000 HC ICU INTERMEDIATE R&B

## 2023-07-24 RX ORDER — DOXYCYCLINE HYCLATE 100 MG/1
100 CAPSULE ORAL 2 TIMES DAILY
Status: DISCONTINUED | OUTPATIENT
Start: 2023-07-24 | End: 2023-07-25

## 2023-07-24 RX ORDER — FUROSEMIDE 10 MG/ML
40 INJECTION INTRAMUSCULAR; INTRAVENOUS DAILY
Status: DISCONTINUED | OUTPATIENT
Start: 2023-07-24 | End: 2023-07-25

## 2023-07-24 RX ADMIN — IPRATROPIUM BROMIDE AND ALBUTEROL SULFATE 1 DOSE: .5; 2.5 SOLUTION RESPIRATORY (INHALATION) at 18:11

## 2023-07-24 RX ADMIN — APIXABAN 5 MG: 5 TABLET, FILM COATED ORAL at 08:34

## 2023-07-24 RX ADMIN — FUROSEMIDE 40 MG: 10 INJECTION, SOLUTION INTRAMUSCULAR; INTRAVENOUS at 08:34

## 2023-07-24 RX ADMIN — CYCLOSPORINE 100 MG: 25 CAPSULE, GELATIN COATED ORAL at 21:31

## 2023-07-24 RX ADMIN — CYCLOSPORINE 100 MG: 25 CAPSULE, GELATIN COATED ORAL at 08:34

## 2023-07-24 RX ADMIN — ATORVASTATIN CALCIUM 10 MG: 20 TABLET, FILM COATED ORAL at 08:35

## 2023-07-24 RX ADMIN — Medication 10 ML: at 08:36

## 2023-07-24 RX ADMIN — NITROGLYCERIN 0.5 INCH: 20 OINTMENT TOPICAL at 01:15

## 2023-07-24 RX ADMIN — ACYCLOVIR 400 MG: 400 TABLET ORAL at 08:34

## 2023-07-24 RX ADMIN — DOXYCYCLINE HYCLATE 100 MG: 100 CAPSULE ORAL at 13:56

## 2023-07-24 RX ADMIN — LEVOTHYROXINE SODIUM 50 MCG: 0.05 TABLET ORAL at 08:36

## 2023-07-24 RX ADMIN — PREDNISONE 40 MG: 20 TABLET ORAL at 08:35

## 2023-07-24 RX ADMIN — LISINOPRIL 5 MG: 5 TABLET ORAL at 08:36

## 2023-07-24 RX ADMIN — AZITHROMYCIN MONOHYDRATE 250 MG: 250 TABLET ORAL at 08:35

## 2023-07-24 RX ADMIN — DOXYCYCLINE HYCLATE 100 MG: 100 CAPSULE ORAL at 21:32

## 2023-07-24 RX ADMIN — ALLOPURINOL 100 MG: 100 TABLET ORAL at 08:35

## 2023-07-24 RX ADMIN — DESVENLAFAXINE 100 MG: 50 TABLET, EXTENDED RELEASE ORAL at 17:26

## 2023-07-24 RX ADMIN — Medication 10 ML: at 21:33

## 2023-07-24 RX ADMIN — FUROSEMIDE 40 MG: 10 INJECTION, SOLUTION INTRAMUSCULAR; INTRAVENOUS at 14:54

## 2023-07-24 RX ADMIN — NITROGLYCERIN 0.5 INCH: 20 OINTMENT TOPICAL at 11:56

## 2023-07-24 RX ADMIN — ARFORMOTEROL TARTRATE 15 MCG: 15 SOLUTION RESPIRATORY (INHALATION) at 18:11

## 2023-07-24 RX ADMIN — CALCITRIOL CAPSULES 0.25 MCG 0.25 MCG: 0.25 CAPSULE ORAL at 21:32

## 2023-07-24 RX ADMIN — FOLIC ACID 1 MG: 1 TABLET ORAL at 08:35

## 2023-07-24 RX ADMIN — NITROGLYCERIN 0.5 INCH: 20 OINTMENT TOPICAL at 17:23

## 2023-07-24 RX ADMIN — BUDESONIDE INHALATION 500 MCG: 0.5 SUSPENSION RESPIRATORY (INHALATION) at 06:41

## 2023-07-24 RX ADMIN — BUDESONIDE INHALATION 500 MCG: 0.5 SUSPENSION RESPIRATORY (INHALATION) at 18:11

## 2023-07-24 RX ADMIN — BENZONATATE 200 MG: 100 CAPSULE ORAL at 08:34

## 2023-07-24 RX ADMIN — ARFORMOTEROL TARTRATE 15 MCG: 15 SOLUTION RESPIRATORY (INHALATION) at 06:41

## 2023-07-24 RX ADMIN — MYCOPHENOLATE MOFETIL 1000 MG: 250 CAPSULE ORAL at 21:31

## 2023-07-24 RX ADMIN — MYCOPHENOLATE MOFETIL 1000 MG: 250 CAPSULE ORAL at 08:35

## 2023-07-24 RX ADMIN — METOPROLOL SUCCINATE 12.5 MG: 25 TABLET, EXTENDED RELEASE ORAL at 08:36

## 2023-07-24 RX ADMIN — APIXABAN 5 MG: 5 TABLET, FILM COATED ORAL at 21:32

## 2023-07-24 RX ADMIN — IPRATROPIUM BROMIDE AND ALBUTEROL SULFATE 1 DOSE: .5; 2.5 SOLUTION RESPIRATORY (INHALATION) at 14:09

## 2023-07-24 RX ADMIN — IPRATROPIUM BROMIDE AND ALBUTEROL SULFATE 1 DOSE: .5; 2.5 SOLUTION RESPIRATORY (INHALATION) at 10:43

## 2023-07-24 RX ADMIN — IPRATROPIUM BROMIDE AND ALBUTEROL SULFATE 1 DOSE: .5; 2.5 SOLUTION RESPIRATORY (INHALATION) at 06:41

## 2023-07-24 RX ADMIN — ACYCLOVIR 400 MG: 400 TABLET ORAL at 21:32

## 2023-07-24 RX ADMIN — METOPROLOL SUCCINATE 12.5 MG: 25 TABLET, EXTENDED RELEASE ORAL at 21:32

## 2023-07-24 ASSESSMENT — PAIN SCALES - GENERAL
PAINLEVEL_OUTOF10: 0
PAINLEVEL_OUTOF10: 0

## 2023-07-24 NOTE — ACP (ADVANCE CARE PLANNING)
Advance Care Planning   Healthcare Decision Maker:    Primary Decision Maker: FreedomMayur morales - Child - [phone]    Secondary Decision Maker: Mariam Mello - Other Relative - [phone]

## 2023-07-24 NOTE — CONSULTS
Nutrition Education    Consulted for diet education. Pt with PMHx renal transplant, COPD, CHF, hypothyroid, PVD, thyroid cancer admitted with SOB, volume overload 2/2 acute on chronic CHF. Attempt x 2 for diet education today. First visit pt notes very tired, unable to keep eyes open & asked RD to return at later time. Pt sound asleep at 2nd attempt. Handouts placed at pt bedside * in discharge instructions. RD to follow up at later time. Will follow up per policy. Learners: Patient  Readiness:  JB to assess  Method: Handout  Response:  JB  Contact name and number provided.     JENNIFER Cuff-Protect, [address] [facility]  Contact Number: [phone]

## 2023-07-24 NOTE — CONSULTS
[facility] Roberto A. Elida Breaker, MD Glorious Fent, MD Lamon Crigler MD .  Consultation  Patient's Name: Chino Nichols  5:02 PM  7/24/2023    Nephrologist: Lamon Crigler, MD    Reason for Consult:  CECILY/CKD  Requesting Physician:  Charlie Pulido MD    Chief Complaint:  shortness of breath     History Obtained From: patient records staff    History of Present Ilness:      80 y.o. female with a history of renal transplant, COPD, Systolic/diastolic CHF, hypothyroid, PVD, thyroid cancer presents with SOB for 6 days . Pt admitted with working diagnosis of decompensated diastolic HF   Pt  was started on iv lasix 40 bid   Pt has hx of cadaveric kidney transplant baseline cr 1.4-1.7   Pt seen in her room she is on o2/nc she is alert oriented x3 she reports no n/v/d .     Past Medical History:   Diagnosis Date    Abnormal EKG     left bundle branch block    Acute cystitis without hematuria 01/20/2023    Acute cystitis without hematuria 01/20/2023    Acute exacerbation of chronic obstructive pulmonary disease (COPD) ([address]) 05/19/2022    Acute gout involving toe of right foot 09/03/2020    Acute on chronic combined systolic (congestive) and diastolic (congestive) heart failure (HCC)     asthmatic bronchitis 03/07/2019    Bipolar disorder ([address]) 11/26/2018    Cancer ([address])     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    Cerebrovascular disease     Clotting disorder ([address]) 1985    thrombophlebitis after c section delivery    Depression     15 years followed by dr Sukhwinder Fleming    Hemodialysis patient [facility])     History of blood transfusion     History of renal transplant 10/2015     [facility]    Hyperlipidemia     Hypertension     Hypothyroidism     Hypothyroidism 11/26/2018    Leg swelling 09/03/2020    Leukocytosis 01/10/2022    Lymphedema of both lower extremities 09/03/2020    Peripheral vascular disease ([address])     Renal failure 11/2012    renal transplant    Sepsis ([address])

## 2023-07-25 ENCOUNTER — APPOINTMENT (OUTPATIENT)
Dept: GENERAL RADIOLOGY | Age: 68
DRG: 291 | End: 2023-07-25
Payer: MEDICARE

## 2023-07-25 LAB
AADO2: 79.8 MMHG
AADO2: 87 MMHG
ANION GAP SERPL CALCULATED.3IONS-SCNC: 10 MMOL/L (ref 7–16)
B PARAP IS1001 DNA NPH QL NAA+NON-PROBE: NOT DETECTED
B PERT DNA SPEC QL NAA+PROBE: NOT DETECTED
B.E.: 0.1 MMOL/L (ref -3–3)
B.E.: 1.1 MMOL/L (ref -3–3)
B.E.: 1.2 MMOL/L (ref -3–3)
BACTERIA URNS QL MICRO: ABNORMAL
BILIRUB UR QL STRIP: NEGATIVE
BNP SERPL-MCNC: 2609 PG/ML (ref 0–450)
BUN SERPL-MCNC: 41 MG/DL (ref 6–23)
C PNEUM DNA NPH QL NAA+NON-PROBE: NOT DETECTED
CALCIUM SERPL-MCNC: 9.6 MG/DL (ref 8.6–10.2)
CHLORIDE SERPL-SCNC: 93 MMOL/L (ref 98–107)
CLARITY UR: ABNORMAL
CO2 SERPL-SCNC: 28 MMOL/L (ref 22–29)
COHB: 0.5 % (ref 0–1.5)
COHB: 0.5 % (ref 0–1.5)
COHB: 0.6 % (ref 0–1.5)
COLOR UR: YELLOW
COMMENT: ABNORMAL
CREAT SERPL-MCNC: 2.7 MG/DL (ref 0.5–1)
CREAT UR-MCNC: 138.9 MG/DL (ref 29–226)
CRITICAL: ABNORMAL
DATE ANALYZED: ABNORMAL
DATE OF COLLECTION: ABNORMAL
EPI CELLS #/AREA URNS HPF: ABNORMAL /HPF
FIO2: 35 %
FIO2: 35 %
FLUAV RNA NPH QL NAA+NON-PROBE: NOT DETECTED
FLUBV RNA NPH QL NAA+NON-PROBE: NOT DETECTED
GFR SERPL CREATININE-BSD FRML MDRD: 19 ML/MIN/1.73M2
GLUCOSE SERPL-MCNC: 89 MG/DL (ref 74–99)
GLUCOSE UR STRIP-MCNC: NEGATIVE MG/DL
HADV DNA NPH QL NAA+NON-PROBE: NOT DETECTED
HCO3: 31 MMOL/L (ref 22–26)
HCO3: 31.1 MMOL/L (ref 22–26)
HCO3: 31.5 MMOL/L (ref 22–26)
HCOV 229E RNA NPH QL NAA+NON-PROBE: NOT DETECTED
HCOV HKU1 RNA NPH QL NAA+NON-PROBE: NOT DETECTED
HCOV NL63 RNA NPH QL NAA+NON-PROBE: NOT DETECTED
HCOV OC43 RNA NPH QL NAA+NON-PROBE: NOT DETECTED
HGB UR QL STRIP.AUTO: ABNORMAL
HHB: 6.7 % (ref 0–5)
HHB: 7.9 % (ref 0–5)
HHB: 8.9 % (ref 0–5)
HMPV RNA NPH QL NAA+NON-PROBE: NOT DETECTED
HPIV1 RNA NPH QL NAA+NON-PROBE: NOT DETECTED
HPIV2 RNA NPH QL NAA+NON-PROBE: NOT DETECTED
HPIV3 RNA NPH QL NAA+NON-PROBE: NOT DETECTED
HPIV4 RNA NPH QL NAA+NON-PROBE: NOT DETECTED
KETONES UR STRIP-MCNC: NEGATIVE MG/DL
LAB: ABNORMAL
LEUKOCYTE ESTERASE UR QL STRIP: ABNORMAL
Lab: ABNORMAL
M PNEUMO DNA NPH QL NAA+NON-PROBE: NOT DETECTED
MAGNESIUM SERPL-MCNC: 1.9 MG/DL (ref 1.6–2.6)
METHB: 0.3 % (ref 0–1.5)
METHB: 0.3 % (ref 0–1.5)
METHB: 0.4 % (ref 0–1.5)
MODE: ABNORMAL
NITRITE UR QL STRIP: NEGATIVE
O2 CONTENT: 16.9 ML/DL
O2 CONTENT: 17.2 ML/DL
O2 CONTENT: 17.2 ML/DL
O2 SATURATION: 91 % (ref 92–98.5)
O2 SATURATION: 92 % (ref 92–98.5)
O2 SATURATION: 93.2 % (ref 92–98.5)
O2HB: 90.3 % (ref 94–97)
O2HB: 91.3 % (ref 94–97)
O2HB: 92.3 % (ref 94–97)
OPERATOR ID: 2220
OPERATOR ID: 2220
OPERATOR ID: 8217
OSMOLALITY UR: 311 MOSM/KG (ref 300–900)
PATIENT TEMP: 37 C
PCO2: 77.9 MMHG (ref 35–45)
PCO2: 81.8 MMHG (ref 35–45)
PCO2: 86.1 MMHG (ref 35–45)
PEEP/CPAP: 5 CMH2O
PEEP/CPAP: 7 CMH2O
PFO2: 1.81 MMHG/%
PFO2: 1.88 MMHG/%
PH BLOOD GAS: 7.17 (ref 7.35–7.45)
PH BLOOD GAS: 7.2 (ref 7.35–7.45)
PH BLOOD GAS: 7.22 (ref 7.35–7.45)
PH UR STRIP: 5 [PH] (ref 5–9)
PIP: 10 CMH2O
PO2: 63.2 MMHG (ref 75–100)
PO2: 65.9 MMHG (ref 75–100)
PO2: 73.5 MMHG (ref 75–100)
POTASSIUM SERPL-SCNC: 5.4 MMOL/L (ref 3.5–5)
PROT UR STRIP-MCNC: 30 MG/DL
PS: 13 CMH20
RBC #/AREA URNS HPF: ABNORMAL /HPF
RI(T): 1.21
RI(T): 1.38
RSV RNA NPH QL NAA+NON-PROBE: NOT DETECTED
RV+EV RNA NPH QL NAA+NON-PROBE: DETECTED
SARS-COV-2 RNA NPH QL NAA+NON-PROBE: NOT DETECTED
SODIUM SERPL-SCNC: 131 MMOL/L (ref 132–146)
SODIUM UR-SCNC: <20 MMOL/L
SOURCE, BLOOD GAS: ABNORMAL
SP GR UR STRIP: >1.03 (ref 1–1.03)
SPECIMEN DESCRIPTION: ABNORMAL
THB: 13.2 G/DL (ref 11.5–16.5)
THB: 13.3 G/DL (ref 11.5–16.5)
THB: 13.4 G/DL (ref 11.5–16.5)
TIME ANALYZED: 1253
TIME ANALYZED: 1515
TIME ANALYZED: 1750
UROBILINOGEN UR STRIP-ACNC: 0.2 EU/DL (ref 0–1)
WBC #/AREA URNS HPF: ABNORMAL /HPF

## 2023-07-25 PROCEDURE — 83735 ASSAY OF MAGNESIUM: CPT

## 2023-07-25 PROCEDURE — 6370000000 HC RX 637 (ALT 250 FOR IP): Performed by: INTERNAL MEDICINE

## 2023-07-25 PROCEDURE — 6370000000 HC RX 637 (ALT 250 FOR IP): Performed by: FAMILY MEDICINE

## 2023-07-25 PROCEDURE — 51798 US URINE CAPACITY MEASURE: CPT

## 2023-07-25 PROCEDURE — 82805 BLOOD GASES W/O2 SATURATION: CPT

## 2023-07-25 PROCEDURE — 6360000002 HC RX W HCPCS: Performed by: INTERNAL MEDICINE

## 2023-07-25 PROCEDURE — 2580000003 HC RX 258: Performed by: FAMILY MEDICINE

## 2023-07-25 PROCEDURE — 82570 ASSAY OF URINE CREATININE: CPT

## 2023-07-25 PROCEDURE — 71045 X-RAY EXAM CHEST 1 VIEW: CPT

## 2023-07-25 PROCEDURE — 99233 SBSQ HOSP IP/OBS HIGH 50: CPT | Performed by: INTERNAL MEDICINE

## 2023-07-25 PROCEDURE — 2580000003 HC RX 258: Performed by: INTERNAL MEDICINE

## 2023-07-25 PROCEDURE — 84300 ASSAY OF URINE SODIUM: CPT

## 2023-07-25 PROCEDURE — 36415 COLL VENOUS BLD VENIPUNCTURE: CPT

## 2023-07-25 PROCEDURE — 99291 CRITICAL CARE FIRST HOUR: CPT | Performed by: INTERNAL MEDICINE

## 2023-07-25 PROCEDURE — 97110 THERAPEUTIC EXERCISES: CPT

## 2023-07-25 PROCEDURE — 2700000000 HC OXYGEN THERAPY PER DAY

## 2023-07-25 PROCEDURE — 6360000002 HC RX W HCPCS: Performed by: FAMILY MEDICINE

## 2023-07-25 PROCEDURE — 94640 AIRWAY INHALATION TREATMENT: CPT

## 2023-07-25 PROCEDURE — 36600 WITHDRAWAL OF ARTERIAL BLOOD: CPT

## 2023-07-25 PROCEDURE — 2060000000 HC ICU INTERMEDIATE R&B

## 2023-07-25 PROCEDURE — 80048 BASIC METABOLIC PNL TOTAL CA: CPT

## 2023-07-25 PROCEDURE — 94660 CPAP INITIATION&MGMT: CPT

## 2023-07-25 PROCEDURE — 83935 ASSAY OF URINE OSMOLALITY: CPT

## 2023-07-25 PROCEDURE — 83880 ASSAY OF NATRIURETIC PEPTIDE: CPT

## 2023-07-25 PROCEDURE — 81001 URINALYSIS AUTO W/SCOPE: CPT

## 2023-07-25 PROCEDURE — 94761 N-INVAS EAR/PLS OXIMETRY MLT: CPT

## 2023-07-25 RX ORDER — IPRATROPIUM BROMIDE AND ALBUTEROL SULFATE 2.5; .5 MG/3ML; MG/3ML
1 SOLUTION RESPIRATORY (INHALATION)
Status: DISCONTINUED | OUTPATIENT
Start: 2023-07-25 | End: 2023-08-01 | Stop reason: HOSPADM

## 2023-07-25 RX ORDER — SODIUM CHLORIDE 9 MG/ML
INJECTION, SOLUTION INTRAVENOUS CONTINUOUS
Status: DISCONTINUED | OUTPATIENT
Start: 2023-07-25 | End: 2023-07-29

## 2023-07-25 RX ADMIN — NITROGLYCERIN 0.5 INCH: 20 OINTMENT TOPICAL at 05:51

## 2023-07-25 RX ADMIN — MYCOPHENOLATE MOFETIL 1000 MG: 250 CAPSULE ORAL at 11:30

## 2023-07-25 RX ADMIN — METHYLPREDNISOLONE SODIUM SUCCINATE 40 MG: 40 INJECTION, POWDER, FOR SOLUTION INTRAMUSCULAR; INTRAVENOUS at 21:18

## 2023-07-25 RX ADMIN — CYCLOSPORINE 100 MG: 25 CAPSULE, GELATIN COATED ORAL at 21:20

## 2023-07-25 RX ADMIN — DOXYCYCLINE HYCLATE 100 MG: 100 CAPSULE ORAL at 11:31

## 2023-07-25 RX ADMIN — ACYCLOVIR 400 MG: 400 TABLET ORAL at 21:20

## 2023-07-25 RX ADMIN — IPRATROPIUM BROMIDE AND ALBUTEROL SULFATE 1 DOSE: .5; 2.5 SOLUTION RESPIRATORY (INHALATION) at 18:19

## 2023-07-25 RX ADMIN — CYCLOSPORINE 100 MG: 25 CAPSULE, GELATIN COATED ORAL at 11:31

## 2023-07-25 RX ADMIN — BUDESONIDE INHALATION 500 MCG: 0.5 SUSPENSION RESPIRATORY (INHALATION) at 18:19

## 2023-07-25 RX ADMIN — ACYCLOVIR 400 MG: 400 TABLET ORAL at 11:31

## 2023-07-25 RX ADMIN — ALLOPURINOL 100 MG: 100 TABLET ORAL at 11:30

## 2023-07-25 RX ADMIN — MYCOPHENOLATE MOFETIL 1000 MG: 250 CAPSULE ORAL at 21:20

## 2023-07-25 RX ADMIN — Medication 10 ML: at 21:20

## 2023-07-25 RX ADMIN — IPRATROPIUM BROMIDE AND ALBUTEROL SULFATE 1 DOSE: .5; 2.5 SOLUTION RESPIRATORY (INHALATION) at 13:36

## 2023-07-25 RX ADMIN — PIPERACILLIN AND TAZOBACTAM 3375 MG: 3; .375 INJECTION, POWDER, LYOPHILIZED, FOR SOLUTION INTRAVENOUS at 17:29

## 2023-07-25 RX ADMIN — DESVENLAFAXINE 100 MG: 50 TABLET, EXTENDED RELEASE ORAL at 17:32

## 2023-07-25 RX ADMIN — APIXABAN 5 MG: 5 TABLET, FILM COATED ORAL at 11:31

## 2023-07-25 RX ADMIN — METHYLPREDNISOLONE SODIUM SUCCINATE 40 MG: 40 INJECTION, POWDER, FOR SOLUTION INTRAMUSCULAR; INTRAVENOUS at 17:32

## 2023-07-25 RX ADMIN — APIXABAN 5 MG: 5 TABLET, FILM COATED ORAL at 21:20

## 2023-07-25 RX ADMIN — PREDNISONE 40 MG: 20 TABLET ORAL at 11:31

## 2023-07-25 RX ADMIN — METOPROLOL SUCCINATE 12.5 MG: 25 TABLET, EXTENDED RELEASE ORAL at 21:20

## 2023-07-25 RX ADMIN — ARFORMOTEROL TARTRATE 15 MCG: 15 SOLUTION RESPIRATORY (INHALATION) at 18:19

## 2023-07-25 RX ADMIN — IPRATROPIUM BROMIDE AND ALBUTEROL SULFATE 1 DOSE: .5; 2.5 SOLUTION RESPIRATORY (INHALATION) at 22:24

## 2023-07-25 RX ADMIN — FOLIC ACID 1 MG: 1 TABLET ORAL at 11:31

## 2023-07-25 RX ADMIN — NITROGLYCERIN 0.5 INCH: 20 OINTMENT TOPICAL at 00:26

## 2023-07-25 RX ADMIN — SODIUM ZIRCONIUM CYCLOSILICATE 10 G: 10 POWDER, FOR SUSPENSION ORAL at 11:30

## 2023-07-25 RX ADMIN — ATORVASTATIN CALCIUM 10 MG: 20 TABLET, FILM COATED ORAL at 11:31

## 2023-07-25 RX ADMIN — SODIUM CHLORIDE: 9 INJECTION, SOLUTION INTRAVENOUS at 11:30

## 2023-07-25 RX ADMIN — NITROGLYCERIN 0.5 INCH: 20 OINTMENT TOPICAL at 17:30

## 2023-07-25 RX ADMIN — LEVOTHYROXINE SODIUM 50 MCG: 0.05 TABLET ORAL at 11:31

## 2023-07-25 RX ADMIN — NITROGLYCERIN 0.5 INCH: 20 OINTMENT TOPICAL at 11:32

## 2023-07-25 ASSESSMENT — PAIN SCALES - GENERAL: PAINLEVEL_OUTOF10: 0

## 2023-07-25 NOTE — CONSULTS
Pulmonary/Critical Care Consult Note    CHIEF COMPLAINT: Acute hypoxemic and hypercapnic respiratory failure, severe COPD with exacerbation, rhinovirus infection, right lower lobe pneumonia, history of kidney transplant 2015, history of parathyroid cancer, diastolic dysfunction, acute superimposed on chronic kidney disease    HISTORY OF PRESENT ILLNESS: The patient is a 72-year-old female admitted on 722 through the emergency room with recent history of sore throat, fever progressive shortness of breath. The patient started having creatinine 1.6 approximately at patient's baseline-received renal transplant in 2015 at which time she stopped smoking but prior to that smoked 2 packs/day for 35 to 40 years. I reviewed the chest x-ray which is developed a right lower lobe infiltrate and her PCR testing showed rhinovirus. Today, I was called because the patient became lethargic and had a pH of 7.174 PCO2 86 PO2 73.5 on 4 L/min nasal cannula. Appears was placed on BiPAP and further titration using 12/6 and 35% therapy 7.20 PCO2 32 PO2 66. I came to see the patient she had been on his BiPAP settings for 2 to 3 hours. She was much more awake alert and completely able to carry on conversation and answer questions. I suspect her actual blood gases at that time were further improved. Patient will require changes in her aerosolized bronchodilators, changes in her antibiotics and we will make sure that urine for Legionella antigen and strep pneumo antigen have also been sent since he is on immunosuppressive's and may be infected by more than 1 organism. We have made changes the patient's ventilator settings and we will repeat her blood gases in about an hour from now. Her recent echocardiogram is unremarkable except for grade 1 diastolic dysfunction.     ALLERGY:  Cipro [ciprofloxacin hcl], Macrodantin [nitrofurantoin macrocrystal], and Sulfa antibiotics    FAMILY HISTORY:  Family History   Problem Relation

## 2023-07-26 ENCOUNTER — APPOINTMENT (OUTPATIENT)
Dept: GENERAL RADIOLOGY | Age: 68
DRG: 291 | End: 2023-07-26
Payer: MEDICARE

## 2023-07-26 ENCOUNTER — APPOINTMENT (OUTPATIENT)
Dept: CT IMAGING | Age: 68
DRG: 291 | End: 2023-07-26
Payer: MEDICARE

## 2023-07-26 ENCOUNTER — APPOINTMENT (OUTPATIENT)
Dept: INTERVENTIONAL RADIOLOGY/VASCULAR | Age: 68
DRG: 291 | End: 2023-07-26
Payer: MEDICARE

## 2023-07-26 LAB
AADO2: 160.3 MMHG
AADO2: 190.2 MMHG
AADO2: 80.2 MMHG
ALBUMIN SERPL-MCNC: 3.7 G/DL (ref 3.5–5.2)
ALP SERPL-CCNC: 61 U/L (ref 35–104)
ALT SERPL-CCNC: 5 U/L (ref 0–32)
ANION GAP SERPL CALCULATED.3IONS-SCNC: 15 MMOL/L (ref 7–16)
AST SERPL-CCNC: 8 U/L (ref 0–31)
B.E.: -0.1 MMOL/L (ref -3–3)
B.E.: -2 MMOL/L (ref -3–3)
B.E.: 0.3 MMOL/L (ref -3–3)
BASOPHILS # BLD: 0.05 K/UL (ref 0–0.2)
BASOPHILS NFR BLD: 1 % (ref 0–2)
BILIRUB SERPL-MCNC: 0.4 MG/DL (ref 0–1.2)
BUN SERPL-MCNC: 51 MG/DL (ref 6–23)
CALCIUM SERPL-MCNC: 9.7 MG/DL (ref 8.6–10.2)
CHLORIDE SERPL-SCNC: 95 MMOL/L (ref 98–107)
CO2 SERPL-SCNC: 24 MMOL/L (ref 22–29)
COHB: 0.2 % (ref 0–1.5)
COHB: 0.2 % (ref 0–1.5)
COHB: 0.3 % (ref 0–1.5)
COMMENT: ABNORMAL
CREAT SERPL-MCNC: 2.4 MG/DL (ref 0.5–1)
CRITICAL: ABNORMAL
DATE ANALYZED: ABNORMAL
DATE OF COLLECTION: ABNORMAL
EOSINOPHIL # BLD: 0 K/UL (ref 0.05–0.5)
EOSINOPHILS RELATIVE PERCENT: 0 % (ref 0–6)
ERYTHROCYTE [DISTWIDTH] IN BLOOD BY AUTOMATED COUNT: 14.5 % (ref 11.5–15)
FIO2: 30 %
FIO2: 50 %
FIO2: 50 %
GFR SERPL CREATININE-BSD FRML MDRD: 22 ML/MIN/1.73M2
GLUCOSE SERPL-MCNC: 133 MG/DL (ref 74–99)
HCO3: 27.8 MMOL/L (ref 22–26)
HCO3: 27.8 MMOL/L (ref 22–26)
HCO3: 30.6 MMOL/L (ref 22–26)
HCT VFR BLD AUTO: 45.6 % (ref 34–48)
HGB BLD-MCNC: 13.9 G/DL (ref 11.5–15.5)
HHB: 10.6 % (ref 0–5)
HHB: 4.5 % (ref 0–5)
HHB: 7.8 % (ref 0–5)
IMM GRANULOCYTES # BLD AUTO: 0.27 K/UL (ref 0–0.58)
IMM GRANULOCYTES NFR BLD: 3 % (ref 0–5)
LAB: ABNORMAL
LYMPHOCYTES NFR BLD: 0.63 K/UL (ref 1.5–4)
LYMPHOCYTES RELATIVE PERCENT: 6 % (ref 20–42)
Lab: ABNORMAL
MAGNESIUM SERPL-MCNC: 2 MG/DL (ref 1.6–2.6)
MCH RBC QN AUTO: 34.1 PG (ref 26–35)
MCHC RBC AUTO-ENTMCNC: 30.5 G/DL (ref 32–34.5)
MCV RBC AUTO: 111.8 FL (ref 80–99.9)
METHB: 0.3 % (ref 0–1.5)
METHB: 0.4 % (ref 0–1.5)
METHB: 0.5 % (ref 0–1.5)
MODE: ABNORMAL
MONOCYTES NFR BLD: 0.13 K/UL (ref 0.1–0.95)
MONOCYTES NFR BLD: 1 % (ref 2–12)
NEUTROPHILS NFR BLD: 89 % (ref 43–80)
NEUTS SEG NFR BLD: 8.76 K/UL (ref 1.8–7.3)
O2 CONTENT: 16.7 ML/DL
O2 CONTENT: 17 ML/DL
O2 CONTENT: 17.4 ML/DL
O2 SATURATION: 89.3 % (ref 92–98.5)
O2 SATURATION: 92.1 % (ref 92–98.5)
O2 SATURATION: 95.5 % (ref 92–98.5)
O2HB: 88.8 % (ref 94–97)
O2HB: 91.5 % (ref 94–97)
O2HB: 94.9 % (ref 94–97)
OPERATOR ID: 274
OPERATOR ID: 301
OPERATOR ID: 7292
PATIENT TEMP: 37 C
PCO2: 57.7 MMHG (ref 35–45)
PCO2: 74.2 MMHG (ref 35–45)
PCO2: 84.7 MMHG (ref 35–45)
PEEP/CPAP: 7 CMH2O
PEEP/CPAP: 8 CMH2O
PEEP/CPAP: 8 CMH2O
PFO2: 1.41 MMHG/%
PFO2: 1.77 MMHG/%
PFO2: 1.95 MMHG/%
PH BLOOD GAS: 7.18 (ref 7.35–7.45)
PH BLOOD GAS: 7.19 (ref 7.35–7.45)
PH BLOOD GAS: 7.3 (ref 7.35–7.45)
PHOSPHATE SERPL-MCNC: 5.6 MG/DL (ref 2.5–4.5)
PLATELET, FLUORESCENCE: 117 K/UL (ref 130–450)
PMV BLD AUTO: 13 FL (ref 7–12)
PO2: 58.4 MMHG (ref 75–100)
PO2: 70.3 MMHG (ref 75–100)
PO2: 88.4 MMHG (ref 75–100)
POTASSIUM SERPL-SCNC: 5.4 MMOL/L (ref 3.5–5)
PROT SERPL-MCNC: 6.4 G/DL (ref 6.4–8.3)
PS: 13 CMH20
RBC # BLD AUTO: 4.08 M/UL (ref 3.5–5.5)
RBC # BLD: ABNORMAL 10*6/UL
RI(T): 1.37
RI(T): 1.81
RI(T): 2.71
RR MECHANICAL: 20 B/MIN
RR MECHANICAL: 20 B/MIN
SODIUM SERPL-SCNC: 134 MMOL/L (ref 132–146)
SOURCE, BLOOD GAS: ABNORMAL
T4 SERPL-MCNC: 4.3 UG/DL (ref 4.5–11.7)
THB: 13 G/DL (ref 11.5–16.5)
THB: 13.2 G/DL (ref 11.5–16.5)
THB: 13.4 G/DL (ref 11.5–16.5)
TIME ANALYZED: 1358
TIME ANALYZED: 541
TIME ANALYZED: 831
VT MECHANICAL: 400 ML
VT MECHANICAL: 400 ML
WBC OTHER # BLD: 9.8 K/UL (ref 4.5–11.5)

## 2023-07-26 PROCEDURE — 99233 SBSQ HOSP IP/OBS HIGH 50: CPT | Performed by: INTERNAL MEDICINE

## 2023-07-26 PROCEDURE — 36415 COLL VENOUS BLD VENIPUNCTURE: CPT

## 2023-07-26 PROCEDURE — 6370000000 HC RX 637 (ALT 250 FOR IP): Performed by: FAMILY MEDICINE

## 2023-07-26 PROCEDURE — 71250 CT THORAX DX C-: CPT

## 2023-07-26 PROCEDURE — 87081 CULTURE SCREEN ONLY: CPT

## 2023-07-26 PROCEDURE — 80053 COMPREHEN METABOLIC PANEL: CPT

## 2023-07-26 PROCEDURE — 2700000000 HC OXYGEN THERAPY PER DAY

## 2023-07-26 PROCEDURE — 83735 ASSAY OF MAGNESIUM: CPT

## 2023-07-26 PROCEDURE — 84100 ASSAY OF PHOSPHORUS: CPT

## 2023-07-26 PROCEDURE — 76937 US GUIDE VASCULAR ACCESS: CPT

## 2023-07-26 PROCEDURE — 87899 AGENT NOS ASSAY W/OPTIC: CPT

## 2023-07-26 PROCEDURE — 36592 COLLECT BLOOD FROM PICC: CPT

## 2023-07-26 PROCEDURE — 6360000002 HC RX W HCPCS: Performed by: FAMILY MEDICINE

## 2023-07-26 PROCEDURE — 6360000002 HC RX W HCPCS: Performed by: INTERNAL MEDICINE

## 2023-07-26 PROCEDURE — 71045 X-RAY EXAM CHEST 1 VIEW: CPT

## 2023-07-26 PROCEDURE — 2580000003 HC RX 258: Performed by: INTERNAL MEDICINE

## 2023-07-26 PROCEDURE — 2580000003 HC RX 258: Performed by: NURSE PRACTITIONER

## 2023-07-26 PROCEDURE — 94660 CPAP INITIATION&MGMT: CPT

## 2023-07-26 PROCEDURE — 02HV33Z INSERTION OF INFUSION DEVICE INTO SUPERIOR VENA CAVA, PERCUTANEOUS APPROACH: ICD-10-PCS | Performed by: INTERNAL MEDICINE

## 2023-07-26 PROCEDURE — 2000000000 HC ICU R&B

## 2023-07-26 PROCEDURE — 36556 INSERT NON-TUNNEL CV CATH: CPT

## 2023-07-26 PROCEDURE — 84436 ASSAY OF TOTAL THYROXINE: CPT

## 2023-07-26 PROCEDURE — 6370000000 HC RX 637 (ALT 250 FOR IP): Performed by: INTERNAL MEDICINE

## 2023-07-26 PROCEDURE — 87449 NOS EACH ORGANISM AG IA: CPT

## 2023-07-26 PROCEDURE — 77001 FLUOROGUIDE FOR VEIN DEVICE: CPT

## 2023-07-26 PROCEDURE — 82805 BLOOD GASES W/O2 SATURATION: CPT

## 2023-07-26 PROCEDURE — 85027 COMPLETE CBC AUTOMATED: CPT

## 2023-07-26 PROCEDURE — 94640 AIRWAY INHALATION TREATMENT: CPT

## 2023-07-26 PROCEDURE — 99291 CRITICAL CARE FIRST HOUR: CPT | Performed by: INTERNAL MEDICINE

## 2023-07-26 RX ORDER — 0.9 % SODIUM CHLORIDE 0.9 %
500 INTRAVENOUS SOLUTION INTRAVENOUS ONCE
Status: COMPLETED | OUTPATIENT
Start: 2023-07-26 | End: 2023-07-26

## 2023-07-26 RX ORDER — HEPARIN 100 UNIT/ML
500 SYRINGE INTRAVENOUS PRN
OUTPATIENT
Start: 2023-07-26

## 2023-07-26 RX ADMIN — METHYLPREDNISOLONE SODIUM SUCCINATE 40 MG: 40 INJECTION, POWDER, FOR SOLUTION INTRAMUSCULAR; INTRAVENOUS at 23:04

## 2023-07-26 RX ADMIN — BUDESONIDE INHALATION 500 MCG: 0.5 SUSPENSION RESPIRATORY (INHALATION) at 16:01

## 2023-07-26 RX ADMIN — IPRATROPIUM BROMIDE AND ALBUTEROL SULFATE 1 DOSE: .5; 2.5 SOLUTION RESPIRATORY (INHALATION) at 02:02

## 2023-07-26 RX ADMIN — CYCLOSPORINE 100 MG: 25 CAPSULE, GELATIN COATED ORAL at 20:33

## 2023-07-26 RX ADMIN — IPRATROPIUM BROMIDE AND ALBUTEROL SULFATE 1 DOSE: .5; 2.5 SOLUTION RESPIRATORY (INHALATION) at 09:08

## 2023-07-26 RX ADMIN — IPRATROPIUM BROMIDE AND ALBUTEROL SULFATE 1 DOSE: .5; 2.5 SOLUTION RESPIRATORY (INHALATION) at 21:43

## 2023-07-26 RX ADMIN — CALCITRIOL CAPSULES 0.25 MCG 0.25 MCG: 0.25 CAPSULE ORAL at 20:33

## 2023-07-26 RX ADMIN — IPRATROPIUM BROMIDE AND ALBUTEROL SULFATE 1 DOSE: .5; 2.5 SOLUTION RESPIRATORY (INHALATION) at 12:29

## 2023-07-26 RX ADMIN — IPRATROPIUM BROMIDE AND ALBUTEROL SULFATE 1 DOSE: .5; 2.5 SOLUTION RESPIRATORY (INHALATION) at 05:35

## 2023-07-26 RX ADMIN — ARFORMOTEROL TARTRATE 15 MCG: 15 SOLUTION RESPIRATORY (INHALATION) at 16:01

## 2023-07-26 RX ADMIN — BUDESONIDE INHALATION 500 MCG: 0.5 SUSPENSION RESPIRATORY (INHALATION) at 05:35

## 2023-07-26 RX ADMIN — APIXABAN 5 MG: 5 TABLET, FILM COATED ORAL at 20:33

## 2023-07-26 RX ADMIN — MYCOPHENOLATE MOFETIL 1000 MG: 250 CAPSULE ORAL at 20:33

## 2023-07-26 RX ADMIN — METHYLPREDNISOLONE SODIUM SUCCINATE 40 MG: 40 INJECTION, POWDER, FOR SOLUTION INTRAMUSCULAR; INTRAVENOUS at 17:17

## 2023-07-26 RX ADMIN — ACYCLOVIR 400 MG: 400 TABLET ORAL at 20:33

## 2023-07-26 RX ADMIN — NITROGLYCERIN 0.5 INCH: 20 OINTMENT TOPICAL at 05:14

## 2023-07-26 RX ADMIN — NITROGLYCERIN 0.5 INCH: 20 OINTMENT TOPICAL at 00:48

## 2023-07-26 RX ADMIN — AZITHROMYCIN MONOHYDRATE 250 MG: 500 INJECTION, POWDER, LYOPHILIZED, FOR SOLUTION INTRAVENOUS at 14:59

## 2023-07-26 RX ADMIN — IPRATROPIUM BROMIDE AND ALBUTEROL SULFATE 1 DOSE: .5; 2.5 SOLUTION RESPIRATORY (INHALATION) at 16:01

## 2023-07-26 RX ADMIN — SODIUM CHLORIDE 500 ML: 9 INJECTION, SOLUTION INTRAVENOUS at 21:01

## 2023-07-26 RX ADMIN — SODIUM CHLORIDE: 9 INJECTION, SOLUTION INTRAVENOUS at 20:38

## 2023-07-26 RX ADMIN — PIPERACILLIN AND TAZOBACTAM 3375 MG: 3; .375 INJECTION, POWDER, LYOPHILIZED, FOR SOLUTION INTRAVENOUS at 17:24

## 2023-07-26 RX ADMIN — DESVENLAFAXINE 100 MG: 50 TABLET, EXTENDED RELEASE ORAL at 17:17

## 2023-07-26 RX ADMIN — ARFORMOTEROL TARTRATE 15 MCG: 15 SOLUTION RESPIRATORY (INHALATION) at 05:35

## 2023-07-26 RX ADMIN — METHYLPREDNISOLONE SODIUM SUCCINATE 40 MG: 40 INJECTION, POWDER, FOR SOLUTION INTRAMUSCULAR; INTRAVENOUS at 05:10

## 2023-07-26 RX ADMIN — METHYLPREDNISOLONE SODIUM SUCCINATE 40 MG: 40 INJECTION, POWDER, FOR SOLUTION INTRAMUSCULAR; INTRAVENOUS at 11:24

## 2023-07-26 RX ADMIN — PIPERACILLIN AND TAZOBACTAM 3375 MG: 3; .375 INJECTION, POWDER, LYOPHILIZED, FOR SOLUTION INTRAVENOUS at 05:09

## 2023-07-26 ASSESSMENT — PAIN SCALES - GENERAL: PAINLEVEL_OUTOF10: 0

## 2023-07-27 ENCOUNTER — HOSPITAL ENCOUNTER (OUTPATIENT)
Dept: INFUSION THERAPY | Age: 68
End: 2023-07-27

## 2023-07-27 ENCOUNTER — APPOINTMENT (OUTPATIENT)
Dept: GENERAL RADIOLOGY | Age: 68
DRG: 291 | End: 2023-07-27
Payer: MEDICARE

## 2023-07-27 LAB
AADO2: 72.5 MMHG
ALBUMIN SERPL-MCNC: 3.4 G/DL (ref 3.5–5.2)
ALP SERPL-CCNC: 46 U/L (ref 35–104)
ALT SERPL-CCNC: <5 U/L (ref 0–32)
ANION GAP SERPL CALCULATED.3IONS-SCNC: 13 MMOL/L (ref 7–16)
AST SERPL-CCNC: 6 U/L (ref 0–31)
B.E.: -0.1 MMOL/L (ref -3–3)
BASOPHILS # BLD: 0.01 K/UL (ref 0–0.2)
BASOPHILS NFR BLD: 0 % (ref 0–2)
BILIRUB SERPL-MCNC: 0.3 MG/DL (ref 0–1.2)
BUN SERPL-MCNC: 66 MG/DL (ref 6–23)
CALCIUM SERPL-MCNC: 9.6 MG/DL (ref 8.6–10.2)
CHLORIDE SERPL-SCNC: 93 MMOL/L (ref 98–107)
CO2 SERPL-SCNC: 26 MMOL/L (ref 22–29)
COHB: 0.3 % (ref 0–1.5)
CREAT SERPL-MCNC: 2.7 MG/DL (ref 0.5–1)
CRITICAL: ABNORMAL
DATE ANALYZED: ABNORMAL
DATE OF COLLECTION: ABNORMAL
EOSINOPHIL # BLD: 0 K/UL (ref 0.05–0.5)
EOSINOPHILS RELATIVE PERCENT: 0 % (ref 0–6)
ERYTHROCYTE [DISTWIDTH] IN BLOOD BY AUTOMATED COUNT: 14.2 % (ref 11.5–15)
FIO2: 30 %
GFR SERPL CREATININE-BSD FRML MDRD: 19 ML/MIN/1.73M2
GLUCOSE SERPL-MCNC: 127 MG/DL (ref 74–99)
HCO3: 27.6 MMOL/L (ref 22–26)
HCT VFR BLD AUTO: 35.2 % (ref 34–48)
HGB BLD-MCNC: 11.1 G/DL (ref 11.5–15.5)
HHB: 9 % (ref 0–5)
IMM GRANULOCYTES # BLD AUTO: 0.1 K/UL (ref 0–0.58)
IMM GRANULOCYTES NFR BLD: 1 % (ref 0–5)
L PNEUMO1 AG UR QL IA.RAPID: NEGATIVE
LAB: ABNORMAL
LYMPHOCYTES NFR BLD: 0.46 K/UL (ref 1.5–4)
LYMPHOCYTES RELATIVE PERCENT: 5 % (ref 20–42)
Lab: ABNORMAL
MAGNESIUM SERPL-MCNC: 1.9 MG/DL (ref 1.6–2.6)
MCH RBC QN AUTO: 33.8 PG (ref 26–35)
MCHC RBC AUTO-ENTMCNC: 31.5 G/DL (ref 32–34.5)
MCV RBC AUTO: 107.3 FL (ref 80–99.9)
METHB: 0.4 % (ref 0–1.5)
MICROORGANISM SPEC CULT: NORMAL
MODE: ABNORMAL
MONOCYTES NFR BLD: 0.23 K/UL (ref 0.1–0.95)
MONOCYTES NFR BLD: 2 % (ref 2–12)
NEUTROPHILS NFR BLD: 92 % (ref 43–80)
NEUTS SEG NFR BLD: 9.44 K/UL (ref 1.8–7.3)
O2 CONTENT: 15.9 ML/DL
O2 SATURATION: 90.9 % (ref 92–98.5)
O2HB: 90.3 % (ref 94–97)
OPERATOR ID: ABNORMAL
PATIENT TEMP: 37 C
PCO2: 58.9 MMHG (ref 35–45)
PEEP/CPAP: 8 CMH2O
PFO2: 2.16 MMHG/%
PH BLOOD GAS: 7.29 (ref 7.35–7.45)
PHOSPHATE SERPL-MCNC: 4.8 MG/DL (ref 2.5–4.5)
PLATELET, FLUORESCENCE: 125 K/UL (ref 130–450)
PMV BLD AUTO: 13.1 FL (ref 7–12)
PO2: 64.7 MMHG (ref 75–100)
POTASSIUM SERPL-SCNC: 4.3 MMOL/L (ref 3.5–5)
PROT SERPL-MCNC: 5.6 G/DL (ref 6.4–8.3)
RBC # BLD AUTO: 3.28 M/UL (ref 3.5–5.5)
RI(T): 1.12
RR MECHANICAL: 20 B/MIN
S PNEUM AG SPEC QL: NEGATIVE
SODIUM SERPL-SCNC: 132 MMOL/L (ref 132–146)
SOURCE, BLOOD GAS: ABNORMAL
SPECIMEN DESCRIPTION: NORMAL
SPECIMEN SOURCE: NORMAL
THB: 12.5 G/DL (ref 11.5–16.5)
TIME ANALYZED: 532
VT MECHANICAL: 400 ML
WBC OTHER # BLD: 10.2 K/UL (ref 4.5–11.5)

## 2023-07-27 PROCEDURE — 94640 AIRWAY INHALATION TREATMENT: CPT

## 2023-07-27 PROCEDURE — 6370000000 HC RX 637 (ALT 250 FOR IP): Performed by: INTERNAL MEDICINE

## 2023-07-27 PROCEDURE — 6370000000 HC RX 637 (ALT 250 FOR IP): Performed by: FAMILY MEDICINE

## 2023-07-27 PROCEDURE — 83735 ASSAY OF MAGNESIUM: CPT

## 2023-07-27 PROCEDURE — 2580000003 HC RX 258: Performed by: FAMILY MEDICINE

## 2023-07-27 PROCEDURE — 97530 THERAPEUTIC ACTIVITIES: CPT | Performed by: PHYSICAL THERAPIST

## 2023-07-27 PROCEDURE — 97110 THERAPEUTIC EXERCISES: CPT | Performed by: PHYSICAL THERAPIST

## 2023-07-27 PROCEDURE — 80053 COMPREHEN METABOLIC PANEL: CPT

## 2023-07-27 PROCEDURE — 84100 ASSAY OF PHOSPHORUS: CPT

## 2023-07-27 PROCEDURE — 99233 SBSQ HOSP IP/OBS HIGH 50: CPT | Performed by: INTERNAL MEDICINE

## 2023-07-27 PROCEDURE — 36600 WITHDRAWAL OF ARTERIAL BLOOD: CPT

## 2023-07-27 PROCEDURE — 2580000003 HC RX 258: Performed by: INTERNAL MEDICINE

## 2023-07-27 PROCEDURE — 2000000000 HC ICU R&B

## 2023-07-27 PROCEDURE — 6360000002 HC RX W HCPCS: Performed by: FAMILY MEDICINE

## 2023-07-27 PROCEDURE — 6360000002 HC RX W HCPCS: Performed by: INTERNAL MEDICINE

## 2023-07-27 PROCEDURE — 82805 BLOOD GASES W/O2 SATURATION: CPT

## 2023-07-27 PROCEDURE — 6360000002 HC RX W HCPCS: Performed by: NURSE PRACTITIONER

## 2023-07-27 PROCEDURE — 2700000000 HC OXYGEN THERAPY PER DAY

## 2023-07-27 PROCEDURE — 94660 CPAP INITIATION&MGMT: CPT

## 2023-07-27 PROCEDURE — 71045 X-RAY EXAM CHEST 1 VIEW: CPT

## 2023-07-27 PROCEDURE — 36592 COLLECT BLOOD FROM PICC: CPT

## 2023-07-27 PROCEDURE — 85027 COMPLETE CBC AUTOMATED: CPT

## 2023-07-27 PROCEDURE — 80158 DRUG ASSAY CYCLOSPORINE: CPT

## 2023-07-27 PROCEDURE — 99291 CRITICAL CARE FIRST HOUR: CPT | Performed by: INTERNAL MEDICINE

## 2023-07-27 RX ADMIN — ALLOPURINOL 100 MG: 100 TABLET ORAL at 08:45

## 2023-07-27 RX ADMIN — MYCOPHENOLATE MOFETIL 1000 MG: 250 CAPSULE ORAL at 20:51

## 2023-07-27 RX ADMIN — ARFORMOTEROL TARTRATE 15 MCG: 15 SOLUTION RESPIRATORY (INHALATION) at 04:30

## 2023-07-27 RX ADMIN — SODIUM CHLORIDE: 9 INJECTION, SOLUTION INTRAVENOUS at 18:11

## 2023-07-27 RX ADMIN — IPRATROPIUM BROMIDE AND ALBUTEROL SULFATE 1 DOSE: .5; 2.5 SOLUTION RESPIRATORY (INHALATION) at 04:30

## 2023-07-27 RX ADMIN — METOPROLOL SUCCINATE 12.5 MG: 25 TABLET, EXTENDED RELEASE ORAL at 20:51

## 2023-07-27 RX ADMIN — METOPROLOL SUCCINATE 12.5 MG: 25 TABLET, EXTENDED RELEASE ORAL at 08:45

## 2023-07-27 RX ADMIN — MYCOPHENOLATE MOFETIL 1000 MG: 250 CAPSULE ORAL at 08:46

## 2023-07-27 RX ADMIN — DESVENLAFAXINE 100 MG: 50 TABLET, EXTENDED RELEASE ORAL at 17:01

## 2023-07-27 RX ADMIN — IPRATROPIUM BROMIDE AND ALBUTEROL SULFATE 1 DOSE: .5; 2.5 SOLUTION RESPIRATORY (INHALATION) at 01:02

## 2023-07-27 RX ADMIN — BUDESONIDE INHALATION 500 MCG: 0.5 SUSPENSION RESPIRATORY (INHALATION) at 18:02

## 2023-07-27 RX ADMIN — ATORVASTATIN CALCIUM 10 MG: 20 TABLET, FILM COATED ORAL at 08:45

## 2023-07-27 RX ADMIN — APIXABAN 2.5 MG: 2.5 TABLET, FILM COATED ORAL at 20:51

## 2023-07-27 RX ADMIN — BUDESONIDE INHALATION 500 MCG: 0.5 SUSPENSION RESPIRATORY (INHALATION) at 04:30

## 2023-07-27 RX ADMIN — IPRATROPIUM BROMIDE AND ALBUTEROL SULFATE 1 DOSE: .5; 2.5 SOLUTION RESPIRATORY (INHALATION) at 09:59

## 2023-07-27 RX ADMIN — ARFORMOTEROL TARTRATE 15 MCG: 15 SOLUTION RESPIRATORY (INHALATION) at 18:02

## 2023-07-27 RX ADMIN — FOLIC ACID 1 MG: 1 TABLET ORAL at 08:46

## 2023-07-27 RX ADMIN — PIPERACILLIN AND TAZOBACTAM 3375 MG: 3; .375 INJECTION, POWDER, LYOPHILIZED, FOR SOLUTION INTRAVENOUS at 17:10

## 2023-07-27 RX ADMIN — METHYLPREDNISOLONE SODIUM SUCCINATE 40 MG: 40 INJECTION, POWDER, FOR SOLUTION INTRAMUSCULAR; INTRAVENOUS at 04:55

## 2023-07-27 RX ADMIN — ACYCLOVIR 400 MG: 400 TABLET ORAL at 20:51

## 2023-07-27 RX ADMIN — IPRATROPIUM BROMIDE AND ALBUTEROL SULFATE 1 DOSE: .5; 2.5 SOLUTION RESPIRATORY (INHALATION) at 13:06

## 2023-07-27 RX ADMIN — METHYLPREDNISOLONE SODIUM SUCCINATE 20 MG: 40 INJECTION, POWDER, FOR SOLUTION INTRAMUSCULAR; INTRAVENOUS at 17:01

## 2023-07-27 RX ADMIN — Medication 10 ML: at 08:47

## 2023-07-27 RX ADMIN — PIPERACILLIN AND TAZOBACTAM 3375 MG: 3; .375 INJECTION, POWDER, LYOPHILIZED, FOR SOLUTION INTRAVENOUS at 04:59

## 2023-07-27 RX ADMIN — CYCLOSPORINE 100 MG: 25 CAPSULE, GELATIN COATED ORAL at 20:51

## 2023-07-27 RX ADMIN — CYCLOSPORINE 100 MG: 25 CAPSULE, GELATIN COATED ORAL at 08:46

## 2023-07-27 RX ADMIN — ACYCLOVIR 400 MG: 400 TABLET ORAL at 08:47

## 2023-07-27 RX ADMIN — AZITHROMYCIN MONOHYDRATE 250 MG: 500 INJECTION, POWDER, LYOPHILIZED, FOR SOLUTION INTRAVENOUS at 15:03

## 2023-07-27 RX ADMIN — IPRATROPIUM BROMIDE AND ALBUTEROL SULFATE 1 DOSE: .5; 2.5 SOLUTION RESPIRATORY (INHALATION) at 18:02

## 2023-07-27 RX ADMIN — IPRATROPIUM BROMIDE AND ALBUTEROL SULFATE 1 DOSE: .5; 2.5 SOLUTION RESPIRATORY (INHALATION) at 21:58

## 2023-07-27 RX ADMIN — APIXABAN 5 MG: 5 TABLET, FILM COATED ORAL at 08:45

## 2023-07-27 RX ADMIN — LEVOTHYROXINE SODIUM 50 MCG: 0.05 TABLET ORAL at 08:45

## 2023-07-27 ASSESSMENT — PAIN SCALES - GENERAL
PAINLEVEL_OUTOF10: 0
PAINLEVEL_OUTOF10: 0

## 2023-07-28 ENCOUNTER — APPOINTMENT (OUTPATIENT)
Dept: GENERAL RADIOLOGY | Age: 68
DRG: 291 | End: 2023-07-28
Payer: MEDICARE

## 2023-07-28 LAB
AADO2: 39.5 MMHG
ALBUMIN SERPL-MCNC: 3.1 G/DL (ref 3.5–5.2)
ALP SERPL-CCNC: 37 U/L (ref 35–104)
ALT SERPL-CCNC: 5 U/L (ref 0–32)
ANION GAP SERPL CALCULATED.3IONS-SCNC: 11 MMOL/L (ref 7–16)
AST SERPL-CCNC: 7 U/L (ref 0–31)
B.E.: -3.9 MMOL/L (ref -3–3)
BASOPHILS # BLD: 0 K/UL (ref 0–0.2)
BASOPHILS NFR BLD: 0 % (ref 0–2)
BILIRUB SERPL-MCNC: 0.3 MG/DL (ref 0–1.2)
BNP SERPL-MCNC: 1734 PG/ML (ref 0–450)
BUN SERPL-MCNC: 72 MG/DL (ref 6–23)
CALCIUM SERPL-MCNC: 8.9 MG/DL (ref 8.6–10.2)
CHLORIDE SERPL-SCNC: 94 MMOL/L (ref 98–107)
CO2 SERPL-SCNC: 23 MMOL/L (ref 22–29)
COHB: 0.2 % (ref 0–1.5)
CREAT SERPL-MCNC: 2.9 MG/DL (ref 0.5–1)
CRITICAL: ABNORMAL
DATE ANALYZED: ABNORMAL
DATE OF COLLECTION: ABNORMAL
EOSINOPHIL # BLD: 0 K/UL (ref 0.05–0.5)
EOSINOPHILS RELATIVE PERCENT: 0 % (ref 0–6)
ERYTHROCYTE [DISTWIDTH] IN BLOOD BY AUTOMATED COUNT: 14.2 % (ref 11.5–15)
FIO2: 25 %
GFR SERPL CREATININE-BSD FRML MDRD: 17 ML/MIN/1.73M2
GLUCOSE SERPL-MCNC: 113 MG/DL (ref 74–107)
HCO3: 22.9 MMOL/L (ref 22–26)
HCT VFR BLD AUTO: 32.6 % (ref 34–48)
HGB BLD-MCNC: 10.4 G/DL (ref 11.5–15.5)
HHB: 6.8 % (ref 0–5)
LAB: ABNORMAL
LYMPHOCYTES NFR BLD: 0.38 K/UL (ref 1.5–4)
LYMPHOCYTES RELATIVE PERCENT: 5 % (ref 20–42)
Lab: ABNORMAL
MAGNESIUM SERPL-MCNC: 1.8 MG/DL (ref 1.6–2.6)
MCH RBC QN AUTO: 33.9 PG (ref 26–35)
MCHC RBC AUTO-ENTMCNC: 31.9 G/DL (ref 32–34.5)
MCV RBC AUTO: 106.2 FL (ref 80–99.9)
METHB: 0.3 % (ref 0–1.5)
MODE: ABNORMAL
MONOCYTES NFR BLD: 0.31 K/UL (ref 0.1–0.95)
MONOCYTES NFR BLD: 4 % (ref 2–12)
NEUTROPHILS NFR BLD: 92 % (ref 43–80)
NEUTS SEG NFR BLD: 7.91 K/UL (ref 1.8–7.3)
O2 CONTENT: 16 ML/DL
O2 SATURATION: 93.2 % (ref 92–98.5)
O2HB: 92.7 % (ref 94–97)
OPERATOR ID: 2323
PATIENT TEMP: 37 C
PCO2: 48.4 MMHG (ref 35–45)
PEEP/CPAP: 8 CMH2O
PFO2: 3 MMHG/%
PH BLOOD GAS: 7.29 (ref 7.35–7.45)
PHOSPHATE SERPL-MCNC: 5.2 MG/DL (ref 2.5–4.5)
PLATELET, FLUORESCENCE: 114 K/UL (ref 130–450)
PMV BLD AUTO: 13.6 FL (ref 7–12)
PO2: 75 MMHG (ref 75–100)
POTASSIUM SERPL-SCNC: 4.3 MMOL/L (ref 3.5–5)
PROT SERPL-MCNC: 5 G/DL (ref 6.4–8.3)
RBC # BLD AUTO: 3.07 M/UL (ref 3.5–5.5)
RBC # BLD: ABNORMAL 10*6/UL
RI(T): 0.53
RR MECHANICAL: 20 B/MIN
SODIUM SERPL-SCNC: 128 MMOL/L (ref 132–146)
SOURCE, BLOOD GAS: ABNORMAL
THB: 12.2 G/DL (ref 11.5–16.5)
TIME ANALYZED: 457
VT MECHANICAL: 400 ML
WBC OTHER # BLD: 8.6 K/UL (ref 4.5–11.5)

## 2023-07-28 PROCEDURE — 94660 CPAP INITIATION&MGMT: CPT

## 2023-07-28 PROCEDURE — 85027 COMPLETE CBC AUTOMATED: CPT

## 2023-07-28 PROCEDURE — 6360000002 HC RX W HCPCS: Performed by: INTERNAL MEDICINE

## 2023-07-28 PROCEDURE — 2580000003 HC RX 258: Performed by: INTERNAL MEDICINE

## 2023-07-28 PROCEDURE — 94667 MNPJ CHEST WALL 1ST: CPT

## 2023-07-28 PROCEDURE — 82805 BLOOD GASES W/O2 SATURATION: CPT

## 2023-07-28 PROCEDURE — 6360000002 HC RX W HCPCS: Performed by: NURSE PRACTITIONER

## 2023-07-28 PROCEDURE — 97110 THERAPEUTIC EXERCISES: CPT

## 2023-07-28 PROCEDURE — 99291 CRITICAL CARE FIRST HOUR: CPT | Performed by: INTERNAL MEDICINE

## 2023-07-28 PROCEDURE — 84100 ASSAY OF PHOSPHORUS: CPT

## 2023-07-28 PROCEDURE — 94640 AIRWAY INHALATION TREATMENT: CPT

## 2023-07-28 PROCEDURE — 99233 SBSQ HOSP IP/OBS HIGH 50: CPT | Performed by: INTERNAL MEDICINE

## 2023-07-28 PROCEDURE — 2580000003 HC RX 258: Performed by: FAMILY MEDICINE

## 2023-07-28 PROCEDURE — 6370000000 HC RX 637 (ALT 250 FOR IP): Performed by: INTERNAL MEDICINE

## 2023-07-28 PROCEDURE — 80053 COMPREHEN METABOLIC PANEL: CPT

## 2023-07-28 PROCEDURE — 6370000000 HC RX 637 (ALT 250 FOR IP): Performed by: NURSE PRACTITIONER

## 2023-07-28 PROCEDURE — 94669 MECHANICAL CHEST WALL OSCILL: CPT

## 2023-07-28 PROCEDURE — 83735 ASSAY OF MAGNESIUM: CPT

## 2023-07-28 PROCEDURE — 83880 ASSAY OF NATRIURETIC PEPTIDE: CPT

## 2023-07-28 PROCEDURE — 2000000000 HC ICU R&B

## 2023-07-28 PROCEDURE — 6360000002 HC RX W HCPCS: Performed by: FAMILY MEDICINE

## 2023-07-28 PROCEDURE — 99232 SBSQ HOSP IP/OBS MODERATE 35: CPT | Performed by: INTERNAL MEDICINE

## 2023-07-28 PROCEDURE — 97530 THERAPEUTIC ACTIVITIES: CPT

## 2023-07-28 PROCEDURE — 71045 X-RAY EXAM CHEST 1 VIEW: CPT

## 2023-07-28 PROCEDURE — 2700000000 HC OXYGEN THERAPY PER DAY

## 2023-07-28 PROCEDURE — 6370000000 HC RX 637 (ALT 250 FOR IP): Performed by: FAMILY MEDICINE

## 2023-07-28 RX ORDER — ACETYLCYSTEINE 100 MG/ML
4 SOLUTION ORAL; RESPIRATORY (INHALATION) 3 TIMES DAILY
Status: DISCONTINUED | OUTPATIENT
Start: 2023-07-28 | End: 2023-08-01 | Stop reason: HOSPADM

## 2023-07-28 RX ORDER — PREDNISONE 10 MG/1
10 TABLET ORAL DAILY
Status: COMPLETED | OUTPATIENT
Start: 2023-07-28 | End: 2023-07-30

## 2023-07-28 RX ORDER — MAGNESIUM SULFATE IN WATER 40 MG/ML
2000 INJECTION, SOLUTION INTRAVENOUS ONCE
Status: COMPLETED | OUTPATIENT
Start: 2023-07-28 | End: 2023-07-28

## 2023-07-28 RX ORDER — FUROSEMIDE 10 MG/ML
20 INJECTION INTRAMUSCULAR; INTRAVENOUS ONCE
Status: DISCONTINUED | OUTPATIENT
Start: 2023-07-28 | End: 2023-07-28

## 2023-07-28 RX ADMIN — IPRATROPIUM BROMIDE AND ALBUTEROL SULFATE 1 DOSE: .5; 2.5 SOLUTION RESPIRATORY (INHALATION) at 01:09

## 2023-07-28 RX ADMIN — ACETYLCYSTEINE 400 MG: 100 INHALANT RESPIRATORY (INHALATION) at 10:08

## 2023-07-28 RX ADMIN — PIPERACILLIN AND TAZOBACTAM 3375 MG: 3; .375 INJECTION, POWDER, LYOPHILIZED, FOR SOLUTION INTRAVENOUS at 17:34

## 2023-07-28 RX ADMIN — ATORVASTATIN CALCIUM 10 MG: 20 TABLET, FILM COATED ORAL at 08:22

## 2023-07-28 RX ADMIN — LEVOTHYROXINE SODIUM 50 MCG: 0.05 TABLET ORAL at 08:22

## 2023-07-28 RX ADMIN — ACETYLCYSTEINE 400 MG: 100 INHALANT RESPIRATORY (INHALATION) at 17:50

## 2023-07-28 RX ADMIN — ARFORMOTEROL TARTRATE 15 MCG: 15 SOLUTION RESPIRATORY (INHALATION) at 17:49

## 2023-07-28 RX ADMIN — AZITHROMYCIN MONOHYDRATE 250 MG: 500 INJECTION, POWDER, LYOPHILIZED, FOR SOLUTION INTRAVENOUS at 14:36

## 2023-07-28 RX ADMIN — FOLIC ACID 1 MG: 1 TABLET ORAL at 08:21

## 2023-07-28 RX ADMIN — BUDESONIDE INHALATION 500 MCG: 0.5 SUSPENSION RESPIRATORY (INHALATION) at 17:49

## 2023-07-28 RX ADMIN — METOPROLOL SUCCINATE 12.5 MG: 25 TABLET, EXTENDED RELEASE ORAL at 08:22

## 2023-07-28 RX ADMIN — PREDNISONE 10 MG: 10 TABLET ORAL at 08:21

## 2023-07-28 RX ADMIN — METOPROLOL SUCCINATE 12.5 MG: 25 TABLET, EXTENDED RELEASE ORAL at 20:52

## 2023-07-28 RX ADMIN — APIXABAN 2.5 MG: 2.5 TABLET, FILM COATED ORAL at 20:53

## 2023-07-28 RX ADMIN — IPRATROPIUM BROMIDE AND ALBUTEROL SULFATE 1 DOSE: .5; 2.5 SOLUTION RESPIRATORY (INHALATION) at 21:35

## 2023-07-28 RX ADMIN — METHYLPREDNISOLONE SODIUM SUCCINATE 20 MG: 40 INJECTION, POWDER, FOR SOLUTION INTRAMUSCULAR; INTRAVENOUS at 04:20

## 2023-07-28 RX ADMIN — Medication 10 ML: at 08:22

## 2023-07-28 RX ADMIN — ACYCLOVIR 400 MG: 400 TABLET ORAL at 08:25

## 2023-07-28 RX ADMIN — IPRATROPIUM BROMIDE AND ALBUTEROL SULFATE 1 DOSE: .5; 2.5 SOLUTION RESPIRATORY (INHALATION) at 10:08

## 2023-07-28 RX ADMIN — MAGNESIUM SULFATE HEPTAHYDRATE 2000 MG: 40 INJECTION, SOLUTION INTRAVENOUS at 06:51

## 2023-07-28 RX ADMIN — CYCLOSPORINE 100 MG: 25 CAPSULE, GELATIN COATED ORAL at 20:51

## 2023-07-28 RX ADMIN — ALLOPURINOL 100 MG: 100 TABLET ORAL at 08:22

## 2023-07-28 RX ADMIN — CYCLOSPORINE 100 MG: 25 CAPSULE, GELATIN COATED ORAL at 08:24

## 2023-07-28 RX ADMIN — SODIUM CHLORIDE: 9 INJECTION, SOLUTION INTRAVENOUS at 09:48

## 2023-07-28 RX ADMIN — APIXABAN 2.5 MG: 2.5 TABLET, FILM COATED ORAL at 08:21

## 2023-07-28 RX ADMIN — ARFORMOTEROL TARTRATE 15 MCG: 15 SOLUTION RESPIRATORY (INHALATION) at 04:30

## 2023-07-28 RX ADMIN — ACYCLOVIR 400 MG: 400 TABLET ORAL at 20:52

## 2023-07-28 RX ADMIN — DESVENLAFAXINE 100 MG: 50 TABLET, EXTENDED RELEASE ORAL at 17:32

## 2023-07-28 RX ADMIN — MYCOPHENOLATE MOFETIL 1000 MG: 250 CAPSULE ORAL at 20:54

## 2023-07-28 RX ADMIN — IPRATROPIUM BROMIDE AND ALBUTEROL SULFATE 1 DOSE: .5; 2.5 SOLUTION RESPIRATORY (INHALATION) at 17:49

## 2023-07-28 RX ADMIN — IPRATROPIUM BROMIDE AND ALBUTEROL SULFATE 1 DOSE: .5; 2.5 SOLUTION RESPIRATORY (INHALATION) at 14:21

## 2023-07-28 RX ADMIN — IPRATROPIUM BROMIDE AND ALBUTEROL SULFATE 1 DOSE: .5; 2.5 SOLUTION RESPIRATORY (INHALATION) at 04:30

## 2023-07-28 RX ADMIN — MYCOPHENOLATE MOFETIL 1000 MG: 250 CAPSULE ORAL at 08:23

## 2023-07-28 RX ADMIN — PIPERACILLIN AND TAZOBACTAM 3375 MG: 3; .375 INJECTION, POWDER, LYOPHILIZED, FOR SOLUTION INTRAVENOUS at 04:49

## 2023-07-28 RX ADMIN — BUDESONIDE INHALATION 500 MCG: 0.5 SUSPENSION RESPIRATORY (INHALATION) at 04:30

## 2023-07-28 RX ADMIN — Medication 10 ML: at 21:02

## 2023-07-28 RX ADMIN — CALCITRIOL CAPSULES 0.25 MCG 0.25 MCG: 0.25 CAPSULE ORAL at 21:02

## 2023-07-28 ASSESSMENT — PAIN SCALES - GENERAL
PAINLEVEL_OUTOF10: 0
PAINLEVEL_OUTOF10: 0

## 2023-07-29 ENCOUNTER — APPOINTMENT (OUTPATIENT)
Dept: GENERAL RADIOLOGY | Age: 68
DRG: 291 | End: 2023-07-29
Payer: MEDICARE

## 2023-07-29 LAB
ALBUMIN SERPL-MCNC: 3.2 G/DL (ref 3.5–5.2)
ALP SERPL-CCNC: 36 U/L (ref 35–104)
ALT SERPL-CCNC: 8 U/L (ref 0–32)
ANION GAP SERPL CALCULATED.3IONS-SCNC: 8 MMOL/L (ref 7–16)
AST SERPL-CCNC: 7 U/L (ref 0–31)
BASOPHILS # BLD: 0.01 K/UL (ref 0–0.2)
BASOPHILS NFR BLD: 0 % (ref 0–2)
BILIRUB SERPL-MCNC: 0.3 MG/DL (ref 0–1.2)
BUN SERPL-MCNC: 71 MG/DL (ref 6–23)
CALCIUM SERPL-MCNC: 9.3 MG/DL (ref 8.6–10.2)
CHLORIDE SERPL-SCNC: 101 MMOL/L (ref 98–107)
CO2 SERPL-SCNC: 25 MMOL/L (ref 22–29)
CREAT SERPL-MCNC: 2.4 MG/DL (ref 0.5–1)
EOSINOPHIL # BLD: 0 K/UL (ref 0.05–0.5)
EOSINOPHILS RELATIVE PERCENT: 0 % (ref 0–6)
ERYTHROCYTE [DISTWIDTH] IN BLOOD BY AUTOMATED COUNT: 14.2 % (ref 11.5–15)
GFR SERPL CREATININE-BSD FRML MDRD: 22 ML/MIN/1.73M2
GLUCOSE SERPL-MCNC: 93 MG/DL (ref 74–107)
HCT VFR BLD AUTO: 35.5 % (ref 34–48)
HGB BLD-MCNC: 11.5 G/DL (ref 11.5–15.5)
IMM GRANULOCYTES # BLD AUTO: 0.09 K/UL (ref 0–0.58)
IMM GRANULOCYTES NFR BLD: 1 % (ref 0–5)
LYMPHOCYTES NFR BLD: 0.7 K/UL (ref 1.5–4)
LYMPHOCYTES RELATIVE PERCENT: 8 % (ref 20–42)
MAGNESIUM SERPL-MCNC: 2.2 MG/DL (ref 1.6–2.6)
MCH RBC QN AUTO: 34.3 PG (ref 26–35)
MCHC RBC AUTO-ENTMCNC: 32.4 G/DL (ref 32–34.5)
MCV RBC AUTO: 106 FL (ref 80–99.9)
MONOCYTES NFR BLD: 0.71 K/UL (ref 0.1–0.95)
MONOCYTES NFR BLD: 8 % (ref 2–12)
NEUTROPHILS NFR BLD: 83 % (ref 43–80)
NEUTS SEG NFR BLD: 7.49 K/UL (ref 1.8–7.3)
PHOSPHATE SERPL-MCNC: 4.6 MG/DL (ref 2.5–4.5)
PLATELET, FLUORESCENCE: 107 K/UL (ref 130–450)
PMV BLD AUTO: 13.5 FL (ref 7–12)
POTASSIUM SERPL-SCNC: 4.5 MMOL/L (ref 3.5–5)
PROT SERPL-MCNC: 5.2 G/DL (ref 6.4–8.3)
RBC # BLD AUTO: 3.35 M/UL (ref 3.5–5.5)
SODIUM SERPL-SCNC: 134 MMOL/L (ref 132–146)
WBC OTHER # BLD: 9 K/UL (ref 4.5–11.5)

## 2023-07-29 PROCEDURE — 80053 COMPREHEN METABOLIC PANEL: CPT

## 2023-07-29 PROCEDURE — 6370000000 HC RX 637 (ALT 250 FOR IP): Performed by: INTERNAL MEDICINE

## 2023-07-29 PROCEDURE — 2700000000 HC OXYGEN THERAPY PER DAY

## 2023-07-29 PROCEDURE — 2060000000 HC ICU INTERMEDIATE R&B

## 2023-07-29 PROCEDURE — 83735 ASSAY OF MAGNESIUM: CPT

## 2023-07-29 PROCEDURE — 99232 SBSQ HOSP IP/OBS MODERATE 35: CPT | Performed by: INTERNAL MEDICINE

## 2023-07-29 PROCEDURE — 2580000003 HC RX 258: Performed by: INTERNAL MEDICINE

## 2023-07-29 PROCEDURE — 6360000002 HC RX W HCPCS: Performed by: FAMILY MEDICINE

## 2023-07-29 PROCEDURE — 6360000002 HC RX W HCPCS: Performed by: INTERNAL MEDICINE

## 2023-07-29 PROCEDURE — 99291 CRITICAL CARE FIRST HOUR: CPT | Performed by: INTERNAL MEDICINE

## 2023-07-29 PROCEDURE — 84100 ASSAY OF PHOSPHORUS: CPT

## 2023-07-29 PROCEDURE — 85027 COMPLETE CBC AUTOMATED: CPT

## 2023-07-29 PROCEDURE — 2580000003 HC RX 258: Performed by: FAMILY MEDICINE

## 2023-07-29 PROCEDURE — 36592 COLLECT BLOOD FROM PICC: CPT

## 2023-07-29 PROCEDURE — 6370000000 HC RX 637 (ALT 250 FOR IP): Performed by: FAMILY MEDICINE

## 2023-07-29 PROCEDURE — 94669 MECHANICAL CHEST WALL OSCILL: CPT

## 2023-07-29 PROCEDURE — 94660 CPAP INITIATION&MGMT: CPT

## 2023-07-29 PROCEDURE — 76937 US GUIDE VASCULAR ACCESS: CPT

## 2023-07-29 PROCEDURE — 6370000000 HC RX 637 (ALT 250 FOR IP): Performed by: NURSE PRACTITIONER

## 2023-07-29 PROCEDURE — 94640 AIRWAY INHALATION TREATMENT: CPT

## 2023-07-29 PROCEDURE — 71045 X-RAY EXAM CHEST 1 VIEW: CPT

## 2023-07-29 RX ORDER — DESVENLAFAXINE 25 MG/1
100 TABLET, EXTENDED RELEASE ORAL EVERY EVENING
Status: DISCONTINUED | OUTPATIENT
Start: 2023-07-30 | End: 2023-08-01 | Stop reason: HOSPADM

## 2023-07-29 RX ADMIN — IPRATROPIUM BROMIDE AND ALBUTEROL SULFATE 1 DOSE: .5; 2.5 SOLUTION RESPIRATORY (INHALATION) at 14:23

## 2023-07-29 RX ADMIN — ACETYLCYSTEINE 400 MG: 100 INHALANT RESPIRATORY (INHALATION) at 04:17

## 2023-07-29 RX ADMIN — MYCOPHENOLATE MOFETIL 1000 MG: 250 CAPSULE ORAL at 08:37

## 2023-07-29 RX ADMIN — ACYCLOVIR 400 MG: 400 TABLET ORAL at 08:38

## 2023-07-29 RX ADMIN — AZITHROMYCIN MONOHYDRATE 250 MG: 500 INJECTION, POWDER, LYOPHILIZED, FOR SOLUTION INTRAVENOUS at 14:45

## 2023-07-29 RX ADMIN — METOPROLOL SUCCINATE 12.5 MG: 25 TABLET, EXTENDED RELEASE ORAL at 21:04

## 2023-07-29 RX ADMIN — BUDESONIDE INHALATION 500 MCG: 0.5 SUSPENSION RESPIRATORY (INHALATION) at 04:17

## 2023-07-29 RX ADMIN — ATORVASTATIN CALCIUM 10 MG: 20 TABLET, FILM COATED ORAL at 08:36

## 2023-07-29 RX ADMIN — ACYCLOVIR 400 MG: 400 TABLET ORAL at 21:04

## 2023-07-29 RX ADMIN — Medication 10 ML: at 21:05

## 2023-07-29 RX ADMIN — ACETYLCYSTEINE 400 MG: 100 INHALANT RESPIRATORY (INHALATION) at 19:12

## 2023-07-29 RX ADMIN — CYCLOSPORINE 100 MG: 25 CAPSULE, GELATIN COATED ORAL at 08:39

## 2023-07-29 RX ADMIN — MYCOPHENOLATE MOFETIL 1000 MG: 250 CAPSULE ORAL at 21:05

## 2023-07-29 RX ADMIN — SODIUM CHLORIDE: 9 INJECTION, SOLUTION INTRAVENOUS at 06:08

## 2023-07-29 RX ADMIN — IPRATROPIUM BROMIDE AND ALBUTEROL SULFATE 1 DOSE: .5; 2.5 SOLUTION RESPIRATORY (INHALATION) at 04:17

## 2023-07-29 RX ADMIN — IPRATROPIUM BROMIDE AND ALBUTEROL SULFATE 1 DOSE: .5; 2.5 SOLUTION RESPIRATORY (INHALATION) at 21:03

## 2023-07-29 RX ADMIN — BUDESONIDE INHALATION 500 MCG: 0.5 SUSPENSION RESPIRATORY (INHALATION) at 19:12

## 2023-07-29 RX ADMIN — IPRATROPIUM BROMIDE AND ALBUTEROL SULFATE 1 DOSE: .5; 2.5 SOLUTION RESPIRATORY (INHALATION) at 01:07

## 2023-07-29 RX ADMIN — CYCLOSPORINE 100 MG: 25 CAPSULE, GELATIN COATED ORAL at 21:05

## 2023-07-29 RX ADMIN — APIXABAN 2.5 MG: 2.5 TABLET, FILM COATED ORAL at 08:36

## 2023-07-29 RX ADMIN — APIXABAN 2.5 MG: 2.5 TABLET, FILM COATED ORAL at 21:04

## 2023-07-29 RX ADMIN — DESVENLAFAXINE 100 MG: 50 TABLET, EXTENDED RELEASE ORAL at 17:29

## 2023-07-29 RX ADMIN — PREDNISONE 10 MG: 10 TABLET ORAL at 08:36

## 2023-07-29 RX ADMIN — Medication 10 ML: at 08:37

## 2023-07-29 RX ADMIN — IPRATROPIUM BROMIDE AND ALBUTEROL SULFATE 1 DOSE: .5; 2.5 SOLUTION RESPIRATORY (INHALATION) at 09:26

## 2023-07-29 RX ADMIN — ARFORMOTEROL TARTRATE 15 MCG: 15 SOLUTION RESPIRATORY (INHALATION) at 04:17

## 2023-07-29 RX ADMIN — FOLIC ACID 1 MG: 1 TABLET ORAL at 08:36

## 2023-07-29 RX ADMIN — PIPERACILLIN AND TAZOBACTAM 3375 MG: 3; .375 INJECTION, POWDER, LYOPHILIZED, FOR SOLUTION INTRAVENOUS at 17:27

## 2023-07-29 RX ADMIN — METOPROLOL SUCCINATE 12.5 MG: 25 TABLET, EXTENDED RELEASE ORAL at 08:36

## 2023-07-29 RX ADMIN — PIPERACILLIN AND TAZOBACTAM 3375 MG: 3; .375 INJECTION, POWDER, LYOPHILIZED, FOR SOLUTION INTRAVENOUS at 05:18

## 2023-07-29 RX ADMIN — ARFORMOTEROL TARTRATE 15 MCG: 15 SOLUTION RESPIRATORY (INHALATION) at 19:12

## 2023-07-29 RX ADMIN — ACETYLCYSTEINE 400 MG: 100 INHALANT RESPIRATORY (INHALATION) at 09:26

## 2023-07-29 RX ADMIN — IPRATROPIUM BROMIDE AND ALBUTEROL SULFATE 1 DOSE: .5; 2.5 SOLUTION RESPIRATORY (INHALATION) at 19:11

## 2023-07-29 RX ADMIN — ALLOPURINOL 100 MG: 100 TABLET ORAL at 08:36

## 2023-07-29 RX ADMIN — LEVOTHYROXINE SODIUM 50 MCG: 0.05 TABLET ORAL at 08:36

## 2023-07-29 ASSESSMENT — PAIN SCALES - GENERAL
PAINLEVEL_OUTOF10: 0

## 2023-07-30 ENCOUNTER — APPOINTMENT (OUTPATIENT)
Dept: GENERAL RADIOLOGY | Age: 68
DRG: 291 | End: 2023-07-30
Payer: MEDICARE

## 2023-07-30 LAB
ALBUMIN SERPL-MCNC: 3.3 G/DL (ref 3.5–5.2)
ALP SERPL-CCNC: 38 U/L (ref 35–104)
ALT SERPL-CCNC: 6 U/L (ref 0–32)
ANION GAP SERPL CALCULATED.3IONS-SCNC: 9 MMOL/L (ref 7–16)
AST SERPL-CCNC: 8 U/L (ref 0–31)
BASOPHILS # BLD: 0 K/UL (ref 0–0.2)
BASOPHILS NFR BLD: 0 % (ref 0–2)
BILIRUB SERPL-MCNC: 0.3 MG/DL (ref 0–1.2)
BUN SERPL-MCNC: 61 MG/DL (ref 6–23)
CALCIUM SERPL-MCNC: 9.8 MG/DL (ref 8.6–10.2)
CHLORIDE SERPL-SCNC: 106 MMOL/L (ref 98–107)
CO2 SERPL-SCNC: 25 MMOL/L (ref 22–29)
CREAT SERPL-MCNC: 1.9 MG/DL (ref 0.5–1)
EOSINOPHIL # BLD: 0 K/UL (ref 0.05–0.5)
EOSINOPHILS RELATIVE PERCENT: 0 % (ref 0–6)
ERYTHROCYTE [DISTWIDTH] IN BLOOD BY AUTOMATED COUNT: 14.6 % (ref 11.5–15)
GFR SERPL CREATININE-BSD FRML MDRD: 28 ML/MIN/1.73M2
GLUCOSE SERPL-MCNC: 82 MG/DL (ref 74–107)
HCT VFR BLD AUTO: 38.5 % (ref 34–48)
HGB BLD-MCNC: 11.9 G/DL (ref 11.5–15.5)
LYMPHOCYTES NFR BLD: 1 K/UL (ref 1.5–4)
LYMPHOCYTES RELATIVE PERCENT: 11 % (ref 20–42)
MAGNESIUM SERPL-MCNC: 2.2 MG/DL (ref 1.6–2.6)
MCH RBC QN AUTO: 34.3 PG (ref 26–35)
MCHC RBC AUTO-ENTMCNC: 30.9 G/DL (ref 32–34.5)
MCV RBC AUTO: 111 FL (ref 80–99.9)
MONOCYTES NFR BLD: 0.85 K/UL (ref 0.1–0.95)
MONOCYTES NFR BLD: 10 % (ref 2–12)
NEUTROPHILS NFR BLD: 79 % (ref 43–80)
NEUTS SEG NFR BLD: 6.95 K/UL (ref 1.8–7.3)
PHOSPHATE SERPL-MCNC: 3.7 MG/DL (ref 2.5–4.5)
PLATELET, FLUORESCENCE: 106 K/UL (ref 130–450)
PMV BLD AUTO: 13.4 FL (ref 7–12)
POTASSIUM SERPL-SCNC: 4.7 MMOL/L (ref 3.5–5)
PROT SERPL-MCNC: 5.4 G/DL (ref 6.4–8.3)
RBC # BLD AUTO: 3.47 M/UL (ref 3.5–5.5)
SODIUM SERPL-SCNC: 140 MMOL/L (ref 132–146)
WBC OTHER # BLD: 8.8 K/UL (ref 4.5–11.5)

## 2023-07-30 PROCEDURE — 6370000000 HC RX 637 (ALT 250 FOR IP): Performed by: INTERNAL MEDICINE

## 2023-07-30 PROCEDURE — 6370000000 HC RX 637 (ALT 250 FOR IP): Performed by: NURSE PRACTITIONER

## 2023-07-30 PROCEDURE — 2580000003 HC RX 258: Performed by: INTERNAL MEDICINE

## 2023-07-30 PROCEDURE — 97110 THERAPEUTIC EXERCISES: CPT

## 2023-07-30 PROCEDURE — 80053 COMPREHEN METABOLIC PANEL: CPT

## 2023-07-30 PROCEDURE — 84100 ASSAY OF PHOSPHORUS: CPT

## 2023-07-30 PROCEDURE — 2580000003 HC RX 258: Performed by: FAMILY MEDICINE

## 2023-07-30 PROCEDURE — 6370000000 HC RX 637 (ALT 250 FOR IP): Performed by: FAMILY MEDICINE

## 2023-07-30 PROCEDURE — 85027 COMPLETE CBC AUTOMATED: CPT

## 2023-07-30 PROCEDURE — 99232 SBSQ HOSP IP/OBS MODERATE 35: CPT | Performed by: INTERNAL MEDICINE

## 2023-07-30 PROCEDURE — 6360000002 HC RX W HCPCS: Performed by: FAMILY MEDICINE

## 2023-07-30 PROCEDURE — 6360000002 HC RX W HCPCS: Performed by: INTERNAL MEDICINE

## 2023-07-30 PROCEDURE — 94640 AIRWAY INHALATION TREATMENT: CPT

## 2023-07-30 PROCEDURE — 2700000000 HC OXYGEN THERAPY PER DAY

## 2023-07-30 PROCEDURE — 94660 CPAP INITIATION&MGMT: CPT

## 2023-07-30 PROCEDURE — 2060000000 HC ICU INTERMEDIATE R&B

## 2023-07-30 PROCEDURE — 36415 COLL VENOUS BLD VENIPUNCTURE: CPT

## 2023-07-30 PROCEDURE — 97530 THERAPEUTIC ACTIVITIES: CPT

## 2023-07-30 PROCEDURE — 83735 ASSAY OF MAGNESIUM: CPT

## 2023-07-30 PROCEDURE — 71045 X-RAY EXAM CHEST 1 VIEW: CPT

## 2023-07-30 RX ADMIN — PIPERACILLIN AND TAZOBACTAM 3375 MG: 3; .375 INJECTION, POWDER, LYOPHILIZED, FOR SOLUTION INTRAVENOUS at 05:44

## 2023-07-30 RX ADMIN — ATORVASTATIN CALCIUM 10 MG: 20 TABLET, FILM COATED ORAL at 08:44

## 2023-07-30 RX ADMIN — METOPROLOL SUCCINATE 12.5 MG: 25 TABLET, EXTENDED RELEASE ORAL at 08:44

## 2023-07-30 RX ADMIN — IPRATROPIUM BROMIDE AND ALBUTEROL SULFATE 1 DOSE: .5; 2.5 SOLUTION RESPIRATORY (INHALATION) at 10:22

## 2023-07-30 RX ADMIN — PREDNISONE 10 MG: 10 TABLET ORAL at 08:44

## 2023-07-30 RX ADMIN — IPRATROPIUM BROMIDE AND ALBUTEROL SULFATE 1 DOSE: .5; 2.5 SOLUTION RESPIRATORY (INHALATION) at 13:42

## 2023-07-30 RX ADMIN — APIXABAN 2.5 MG: 2.5 TABLET, FILM COATED ORAL at 08:44

## 2023-07-30 RX ADMIN — AZITHROMYCIN MONOHYDRATE 250 MG: 500 INJECTION, POWDER, LYOPHILIZED, FOR SOLUTION INTRAVENOUS at 15:05

## 2023-07-30 RX ADMIN — IPRATROPIUM BROMIDE AND ALBUTEROL SULFATE 1 DOSE: .5; 2.5 SOLUTION RESPIRATORY (INHALATION) at 21:35

## 2023-07-30 RX ADMIN — APIXABAN 2.5 MG: 2.5 TABLET, FILM COATED ORAL at 20:37

## 2023-07-30 RX ADMIN — IPRATROPIUM BROMIDE AND ALBUTEROL SULFATE 1 DOSE: .5; 2.5 SOLUTION RESPIRATORY (INHALATION) at 18:28

## 2023-07-30 RX ADMIN — Medication 10 ML: at 20:33

## 2023-07-30 RX ADMIN — ACYCLOVIR 400 MG: 400 TABLET ORAL at 20:34

## 2023-07-30 RX ADMIN — BUDESONIDE INHALATION 500 MCG: 0.5 SUSPENSION RESPIRATORY (INHALATION) at 18:28

## 2023-07-30 RX ADMIN — ACETYLCYSTEINE 400 MG: 100 INHALANT RESPIRATORY (INHALATION) at 18:28

## 2023-07-30 RX ADMIN — ALLOPURINOL 100 MG: 100 TABLET ORAL at 08:44

## 2023-07-30 RX ADMIN — MYCOPHENOLATE MOFETIL 1000 MG: 250 CAPSULE ORAL at 08:44

## 2023-07-30 RX ADMIN — ARFORMOTEROL TARTRATE 15 MCG: 15 SOLUTION RESPIRATORY (INHALATION) at 06:35

## 2023-07-30 RX ADMIN — ARFORMOTEROL TARTRATE 15 MCG: 15 SOLUTION RESPIRATORY (INHALATION) at 18:28

## 2023-07-30 RX ADMIN — CYCLOSPORINE 100 MG: 25 CAPSULE, GELATIN COATED ORAL at 08:44

## 2023-07-30 RX ADMIN — DESVENLAFAXINE SUCCINATE 100 MG: 25 TABLET, EXTENDED RELEASE ORAL at 16:12

## 2023-07-30 RX ADMIN — PIPERACILLIN AND TAZOBACTAM 3375 MG: 3; .375 INJECTION, POWDER, LYOPHILIZED, FOR SOLUTION INTRAVENOUS at 16:17

## 2023-07-30 RX ADMIN — CYCLOSPORINE 100 MG: 25 CAPSULE, GELATIN COATED ORAL at 20:34

## 2023-07-30 RX ADMIN — IPRATROPIUM BROMIDE AND ALBUTEROL SULFATE 1 DOSE: .5; 2.5 SOLUTION RESPIRATORY (INHALATION) at 01:55

## 2023-07-30 RX ADMIN — METOPROLOL SUCCINATE 12.5 MG: 25 TABLET, EXTENDED RELEASE ORAL at 20:37

## 2023-07-30 RX ADMIN — FOLIC ACID 1 MG: 1 TABLET ORAL at 08:44

## 2023-07-30 RX ADMIN — BUDESONIDE INHALATION 500 MCG: 0.5 SUSPENSION RESPIRATORY (INHALATION) at 06:35

## 2023-07-30 RX ADMIN — LEVOTHYROXINE SODIUM 50 MCG: 0.05 TABLET ORAL at 08:44

## 2023-07-30 RX ADMIN — MYCOPHENOLATE MOFETIL 1000 MG: 250 CAPSULE ORAL at 20:37

## 2023-07-30 RX ADMIN — Medication 10 ML: at 08:42

## 2023-07-30 RX ADMIN — ACETYLCYSTEINE 400 MG: 100 INHALANT RESPIRATORY (INHALATION) at 06:35

## 2023-07-30 RX ADMIN — ACETYLCYSTEINE 400 MG: 100 INHALANT RESPIRATORY (INHALATION) at 10:22

## 2023-07-30 RX ADMIN — IPRATROPIUM BROMIDE AND ALBUTEROL SULFATE 1 DOSE: .5; 2.5 SOLUTION RESPIRATORY (INHALATION) at 06:35

## 2023-07-30 RX ADMIN — ACYCLOVIR 400 MG: 400 TABLET ORAL at 08:44

## 2023-07-30 ASSESSMENT — PAIN SCALES - GENERAL: PAINLEVEL_OUTOF10: 0

## 2023-07-30 NOTE — PLAN OF CARE
Patient's chart updated to reflect:      . - HF care plan, HF education points and HF discharge instructions.  -Orders: 2 gram sodium diet, daily weights, I/O.  -PCP and cardiology follow up appointments to be scheduled within 7 days of hospital discharge. -CHF education session will be provided to the patient prior to hospital discharge.        Natty Rivera RN, MSN, CV-BC  Heart Failure Navigator
Problem: Cardiovascular - Adult  Goal: Maintains optimal cardiac output and hemodynamic stability  Outcome: Progressing  Goal: Absence of cardiac dysrhythmias or at baseline  Outcome: Progressing     Problem: Metabolic/Fluid and Electrolytes - Adult  Goal: Electrolytes maintained within normal limits  Outcome: Progressing  Goal: Hemodynamic stability and optimal renal function maintained  Outcome: Progressing
Problem: Discharge Planning  Goal: Discharge to home or other facility with appropriate resources  7/26/2023 0015 by Chava Barksdale RN  Outcome: Progressing  7/25/2023 1312 by Ruben Preston RN  Outcome: Progressing     Problem: Safety - Adult  Goal: Free from fall injury  7/26/2023 0015 by Chava Barksdale RN  Outcome: Progressing  7/25/2023 1312 by Ruben Preston RN  Outcome: Progressing     Problem: ABCDS Injury Assessment  Goal: Absence of physical injury  7/26/2023 0015 by Chava Barksdale RN  Outcome: Progressing  7/25/2023 1312 by Ruben Preston RN  Outcome: Progressing     Problem: Cardiovascular - Adult  Goal: Maintains optimal cardiac output and hemodynamic stability  7/26/2023 0015 by Chava Barksdale RN  Outcome: Progressing  7/25/2023 1312 by Ruben Preston RN  Outcome: Progressing  Goal: Absence of cardiac dysrhythmias or at baseline  7/26/2023 0015 by Chava Barksdale RN  Outcome: Progressing  7/25/2023 1312 by Ruben Preston RN  Outcome: Progressing     Problem: Chronic Conditions and Co-morbidities  Goal: Patient's chronic conditions and co-morbidity symptoms are monitored and maintained or improved  7/26/2023 0015 by Chava Barksdale RN  Outcome: Progressing  7/25/2023 1312 by Ruben Preston RN  Outcome: Progressing     Problem: Metabolic/Fluid and Electrolytes - Adult  Goal: Electrolytes maintained within normal limits  7/26/2023 0015 by Chava Barksdale RN  Outcome: Progressing  7/25/2023 1312 by Ruben Preston RN  Outcome: Progressing  Goal: Hemodynamic stability and optimal renal function maintained  7/26/2023 0015 by Chava Barksdale RN  Outcome: Progressing  7/25/2023 1312 by Ruben Preston RN  Outcome: Progressing     Problem: Pain  Goal: Verbalizes/displays adequate comfort level or baseline comfort level  7/26/2023 0015 by Chava Barksdale RN  Outcome: Progressing  7/25/2023 1312 by Ruben Preston RN  Outcome: Progressing
Problem: Discharge Planning  Goal: Discharge to home or other facility with appropriate resources  7/27/2023 2253 by Antony Johns RN  Outcome: Progressing     Problem: Safety - Adult  Goal: Free from fall injury  7/27/2023 2253 by Antony Johns RN  Outcome: Progressing     Problem: ABCDS Injury Assessment  Goal: Absence of physical injury  7/27/2023 2253 by Antony Johns RN  Outcome: Progressing     Problem: Cardiovascular - Adult  Goal: Maintains optimal cardiac output and hemodynamic stability  7/27/2023 2253 by Antony Johns RN  Outcome: Progressing     Problem: Cardiovascular - Adult  Goal: Absence of cardiac dysrhythmias or at baseline  7/27/2023 2253 by Antony Johns RN  Outcome: Progressing
Problem: Discharge Planning  Goal: Discharge to home or other facility with appropriate resources  Outcome: Progressing     Problem: Safety - Adult  Goal: Free from fall injury  7/29/2023 1842 by Milla Montelongo RN  Outcome: Progressing     Problem: ABCDS Injury Assessment  Goal: Absence of physical injury  7/29/2023 1842 by Milla Montelongo RN  Outcome: Progressing       Problem: Cardiovascular - Adult  Goal: Maintains optimal cardiac output and hemodynamic stability  Outcome: Progressing     Problem: Cardiovascular - Adult  Goal: Absence of cardiac dysrhythmias or at baseline  Outcome: Progressing     Problem: Chronic Conditions and Co-morbidities  Goal: Patient's chronic conditions and co-morbidity symptoms are monitored and maintained or improved  Outcome: Progressing     Problem: Metabolic/Fluid and Electrolytes - Adult  Goal: Electrolytes maintained within normal limits  Outcome: Progressing     Problem: Metabolic/Fluid and Electrolytes - Adult  Goal: Hemodynamic stability and optimal renal function maintained  Outcome: Progressing     Problem: Pain  Goal: Verbalizes/displays adequate comfort level or baseline comfort level  Outcome: Progressing     Problem: Skin/Tissue Integrity  Goal: Absence of new skin breakdown  Description: 1. Monitor for areas of redness and/or skin breakdown  2. Assess vascular access sites hourly  3. Every 4-6 hours minimum:  Change oxygen saturation probe site  4. Every 4-6 hours:  If on nasal continuous positive airway pressure, respiratory therapy assess nares and determine need for appliance change or resting period.   Outcome: Progressing
Problem: Discharge Planning  Goal: Discharge to home or other facility with appropriate resources  Outcome: Progressing     Problem: Safety - Adult  Goal: Free from fall injury  Outcome: Progressing     Problem: ABCDS Injury Assessment  Goal: Absence of physical injury  Outcome: Progressing     Problem: Cardiovascular - Adult  Goal: Maintains optimal cardiac output and hemodynamic stability  7/23/2023 1522 by Ilda Villalpando RN  Outcome: Progressing  7/23/2023 1513 by Jami Posada RN  Outcome: Progressing  Goal: Absence of cardiac dysrhythmias or at baseline  7/23/2023 1522 by Ilda Villalpando RN  Outcome: Progressing  7/23/2023 1513 by Jami Posada RN  Outcome: Progressing     Problem: Metabolic/Fluid and Electrolytes - Adult  Goal: Electrolytes maintained within normal limits  7/23/2023 1522 by Ilda Villalpando RN  Outcome: Progressing  7/23/2023 1513 by Jami Posada RN  Outcome: Progressing  Goal: Hemodynamic stability and optimal renal function maintained  7/23/2023 1522 by Ilda Villalpando RN  Outcome: Progressing  7/23/2023 1513 by Jami Posada RN  Outcome: Progressing     Problem: Chronic Conditions and Co-morbidities  Goal: Patient's chronic conditions and co-morbidity symptoms are monitored and maintained or improved  Outcome: Progressing
Problem: Discharge Planning  Goal: Discharge to home or other facility with appropriate resources  Outcome: Progressing     Problem: Safety - Adult  Goal: Free from fall injury  Outcome: Progressing     Problem: ABCDS Injury Assessment  Goal: Absence of physical injury  Outcome: Progressing     Problem: Cardiovascular - Adult  Goal: Maintains optimal cardiac output and hemodynamic stability  Outcome: Progressing     Problem: Cardiovascular - Adult  Goal: Absence of cardiac dysrhythmias or at baseline  Outcome: Progressing     Problem: Chronic Conditions and Co-morbidities  Goal: Patient's chronic conditions and co-morbidity symptoms are monitored and maintained or improved  Outcome: Progressing     Problem: Metabolic/Fluid and Electrolytes - Adult  Goal: Electrolytes maintained within normal limits  Outcome: Progressing     Problem: Metabolic/Fluid and Electrolytes - Adult  Goal: Hemodynamic stability and optimal renal function maintained  Outcome: Progressing     Problem: Pain  Goal: Verbalizes/displays adequate comfort level or baseline comfort level  Outcome: Progressing
Problem: Discharge Planning  Goal: Discharge to home or other facility with appropriate resources  Outcome: Progressing     Problem: Safety - Adult  Goal: Free from fall injury  Outcome: Progressing     Problem: ABCDS Injury Assessment  Goal: Absence of physical injury  Outcome: Progressing     Problem: Cardiovascular - Adult  Goal: Maintains optimal cardiac output and hemodynamic stability  Outcome: Progressing     Problem: Cardiovascular - Adult  Goal: Absence of cardiac dysrhythmias or at baseline  Outcome: Progressing     Problem: Chronic Conditions and Co-morbidities  Goal: Patient's chronic conditions and co-morbidity symptoms are monitored and maintained or improved  Outcome: Progressing     Problem: Metabolic/Fluid and Electrolytes - Adult  Goal: Electrolytes maintained within normal limits  Outcome: Progressing     Problem: Metabolic/Fluid and Electrolytes - Adult  Goal: Hemodynamic stability and optimal renal function maintained  Outcome: Progressing     Problem: Pain  Goal: Verbalizes/displays adequate comfort level or baseline comfort level  Outcome: Progressing     Problem: Skin/Tissue Integrity  Goal: Absence of new skin breakdown  Description: 1. Monitor for areas of redness and/or skin breakdown  2. Assess vascular access sites hourly  3. Every 4-6 hours minimum:  Change oxygen saturation probe site  4. Every 4-6 hours:  If on nasal continuous positive airway pressure, respiratory therapy assess nares and determine need for appliance change or resting period.   Outcome: Progressing
Problem: Discharge Planning  Goal: Discharge to home or other facility with appropriate resources  Outcome: Progressing     Problem: Safety - Adult  Goal: Free from fall injury  Outcome: Progressing     Problem: ABCDS Injury Assessment  Goal: Absence of physical injury  Outcome: Progressing     Problem: Cardiovascular - Adult  Goal: Maintains optimal cardiac output and hemodynamic stability  Outcome: Progressing     Problem: Chronic Conditions and Co-morbidities  Goal: Patient's chronic conditions and co-morbidity symptoms are monitored and maintained or improved  Outcome: Progressing     Problem: Metabolic/Fluid and Electrolytes - Adult  Goal: Electrolytes maintained within normal limits  Outcome: Progressing  Goal: Hemodynamic stability and optimal renal function maintained  Outcome: Progressing     Problem: Pain  Goal: Verbalizes/displays adequate comfort level or baseline comfort level  Outcome: Progressing
Problem: Discharge Planning  Goal: Discharge to home or other facility with appropriate resources  Outcome: Progressing     Problem: Safety - Adult  Goal: Free from fall injury  Outcome: Progressing     Problem: ABCDS Injury Assessment  Goal: Absence of physical injury  Outcome: Progressing     Problem: Cardiovascular - Adult  Goal: Maintains optimal cardiac output and hemodynamic stability  Outcome: Progressing  Goal: Absence of cardiac dysrhythmias or at baseline  Outcome: Progressing     Problem: Chronic Conditions and Co-morbidities  Goal: Patient's chronic conditions and co-morbidity symptoms are monitored and maintained or improved  Outcome: Progressing     Problem: Metabolic/Fluid and Electrolytes - Adult  Goal: Electrolytes maintained within normal limits  Outcome: Progressing  Goal: Hemodynamic stability and optimal renal function maintained  Outcome: Progressing     Problem: Pain  Goal: Verbalizes/displays adequate comfort level or baseline comfort level  Outcome: Progressing
Problem: Safety - Adult  Goal: Free from fall injury  7/25/2023 1312 by Karli Thompson RN  Outcome: Progressing
Problem: Safety - Adult  Goal: Free from fall injury  7/29/2023 0616 by Dot Harrington RN  Outcome: Progressing  7/28/2023 1830 by Teodora Dooley RN  Outcome: Progressing     Problem: ABCDS Injury Assessment  Goal: Absence of physical injury  7/29/2023 0616 by Dot Harrington RN  Outcome: Progressing  7/28/2023 1830 by Teodora Dooley RN  Outcome: Progressing
Abelardo Matson RN  Outcome: Progressing  7/30/2023 0048 by Олег Marcum RN  Outcome: Progressing

## 2023-07-31 ENCOUNTER — APPOINTMENT (OUTPATIENT)
Dept: GENERAL RADIOLOGY | Age: 68
DRG: 291 | End: 2023-07-31
Payer: MEDICARE

## 2023-07-31 ENCOUNTER — HOSPITAL ENCOUNTER (OUTPATIENT)
Dept: OTHER | Age: 68
Setting detail: THERAPIES SERIES
Discharge: HOME | End: 2023-07-31

## 2023-07-31 LAB
ALBUMIN SERPL-MCNC: 3.3 G/DL (ref 3.5–5.2)
ALP SERPL-CCNC: 39 U/L (ref 35–104)
ALT SERPL-CCNC: 6 U/L (ref 0–32)
ANION GAP SERPL CALCULATED.3IONS-SCNC: 7 MMOL/L (ref 7–16)
AST SERPL-CCNC: 9 U/L (ref 0–31)
BASOPHILS # BLD: 0 K/UL (ref 0–0.2)
BASOPHILS NFR BLD: 0 % (ref 0–2)
BILIRUB SERPL-MCNC: 0.3 MG/DL (ref 0–1.2)
BNP SERPL-MCNC: 2894 PG/ML (ref 0–450)
BUN SERPL-MCNC: 46 MG/DL (ref 6–23)
CALCIUM SERPL-MCNC: 9.8 MG/DL (ref 8.6–10.2)
CHLORIDE SERPL-SCNC: 107 MMOL/L (ref 98–107)
CO2 SERPL-SCNC: 27 MMOL/L (ref 22–29)
CREAT SERPL-MCNC: 1.6 MG/DL (ref 0.5–1)
EOSINOPHIL # BLD: 0 K/UL (ref 0.05–0.5)
EOSINOPHILS RELATIVE PERCENT: 0 % (ref 0–6)
ERYTHROCYTE [DISTWIDTH] IN BLOOD BY AUTOMATED COUNT: 14.9 % (ref 11.5–15)
GFR SERPL CREATININE-BSD FRML MDRD: 36 ML/MIN/1.73M2
GLUCOSE SERPL-MCNC: 84 MG/DL (ref 74–107)
HCT VFR BLD AUTO: 38.9 % (ref 34–48)
HGB BLD-MCNC: 11.8 G/DL (ref 11.5–15.5)
LYMPHOCYTES NFR BLD: 1.3 K/UL (ref 1.5–4)
LYMPHOCYTES RELATIVE PERCENT: 15 % (ref 20–42)
MAGNESIUM SERPL-MCNC: 1.9 MG/DL (ref 1.6–2.6)
MCH RBC QN AUTO: 33.6 PG (ref 26–35)
MCHC RBC AUTO-ENTMCNC: 30.3 G/DL (ref 32–34.5)
MCV RBC AUTO: 110.8 FL (ref 80–99.9)
MONOCYTES NFR BLD: 0.15 K/UL (ref 0.1–0.95)
MONOCYTES NFR BLD: 2 % (ref 2–12)
NEUTROPHILS NFR BLD: 84 % (ref 43–80)
NEUTS SEG NFR BLD: 7.35 K/UL (ref 1.8–7.3)
NUCLEATED RED BLOOD CELLS: 1 PER 100 WBC
PHOSPHATE SERPL-MCNC: 3.1 MG/DL (ref 2.5–4.5)
PLATELET # BLD AUTO: 106 K/UL (ref 130–450)
PMV BLD AUTO: 12.6 FL (ref 7–12)
POTASSIUM SERPL-SCNC: 5 MMOL/L (ref 3.5–5)
PROT SERPL-MCNC: 5.3 G/DL (ref 6.4–8.3)
RBC # BLD AUTO: 3.51 M/UL (ref 3.5–5.5)
RBC # BLD: NORMAL 10*6/UL
SODIUM SERPL-SCNC: 141 MMOL/L (ref 132–146)
WBC OTHER # BLD: 8.8 K/UL (ref 4.5–11.5)

## 2023-07-31 PROCEDURE — 6370000000 HC RX 637 (ALT 250 FOR IP): Performed by: INTERNAL MEDICINE

## 2023-07-31 PROCEDURE — 2580000003 HC RX 258: Performed by: FAMILY MEDICINE

## 2023-07-31 PROCEDURE — 99232 SBSQ HOSP IP/OBS MODERATE 35: CPT | Performed by: INTERNAL MEDICINE

## 2023-07-31 PROCEDURE — 84100 ASSAY OF PHOSPHORUS: CPT

## 2023-07-31 PROCEDURE — 36415 COLL VENOUS BLD VENIPUNCTURE: CPT

## 2023-07-31 PROCEDURE — 2060000000 HC ICU INTERMEDIATE R&B

## 2023-07-31 PROCEDURE — 94669 MECHANICAL CHEST WALL OSCILL: CPT

## 2023-07-31 PROCEDURE — 71045 X-RAY EXAM CHEST 1 VIEW: CPT

## 2023-07-31 PROCEDURE — 85027 COMPLETE CBC AUTOMATED: CPT

## 2023-07-31 PROCEDURE — 94640 AIRWAY INHALATION TREATMENT: CPT

## 2023-07-31 PROCEDURE — 6360000002 HC RX W HCPCS: Performed by: INTERNAL MEDICINE

## 2023-07-31 PROCEDURE — 83735 ASSAY OF MAGNESIUM: CPT

## 2023-07-31 PROCEDURE — 80053 COMPREHEN METABOLIC PANEL: CPT

## 2023-07-31 PROCEDURE — 83880 ASSAY OF NATRIURETIC PEPTIDE: CPT

## 2023-07-31 PROCEDURE — 94660 CPAP INITIATION&MGMT: CPT

## 2023-07-31 PROCEDURE — 97535 SELF CARE MNGMENT TRAINING: CPT

## 2023-07-31 PROCEDURE — 2580000003 HC RX 258: Performed by: INTERNAL MEDICINE

## 2023-07-31 PROCEDURE — 97530 THERAPEUTIC ACTIVITIES: CPT

## 2023-07-31 PROCEDURE — 97110 THERAPEUTIC EXERCISES: CPT

## 2023-07-31 PROCEDURE — 6370000000 HC RX 637 (ALT 250 FOR IP): Performed by: FAMILY MEDICINE

## 2023-07-31 PROCEDURE — 6360000002 HC RX W HCPCS: Performed by: FAMILY MEDICINE

## 2023-07-31 RX ORDER — BUMETANIDE 1 MG/1
1 TABLET ORAL
Status: DISCONTINUED | OUTPATIENT
Start: 2023-07-31 | End: 2023-08-01 | Stop reason: HOSPADM

## 2023-07-31 RX ADMIN — BUDESONIDE INHALATION 500 MCG: 0.5 SUSPENSION RESPIRATORY (INHALATION) at 06:15

## 2023-07-31 RX ADMIN — APIXABAN 2.5 MG: 2.5 TABLET, FILM COATED ORAL at 20:46

## 2023-07-31 RX ADMIN — PIPERACILLIN AND TAZOBACTAM 3375 MG: 3; .375 INJECTION, POWDER, LYOPHILIZED, FOR SOLUTION INTRAVENOUS at 05:26

## 2023-07-31 RX ADMIN — DESVENLAFAXINE SUCCINATE 100 MG: 25 TABLET, EXTENDED RELEASE ORAL at 16:51

## 2023-07-31 RX ADMIN — Medication 10 ML: at 20:47

## 2023-07-31 RX ADMIN — PIPERACILLIN AND TAZOBACTAM 3375 MG: 3; .375 INJECTION, POWDER, LYOPHILIZED, FOR SOLUTION INTRAVENOUS at 16:56

## 2023-07-31 RX ADMIN — IPRATROPIUM BROMIDE AND ALBUTEROL SULFATE 1 DOSE: .5; 2.5 SOLUTION RESPIRATORY (INHALATION) at 13:30

## 2023-07-31 RX ADMIN — ARFORMOTEROL TARTRATE 15 MCG: 15 SOLUTION RESPIRATORY (INHALATION) at 18:04

## 2023-07-31 RX ADMIN — CYCLOSPORINE 100 MG: 25 CAPSULE, GELATIN COATED ORAL at 09:36

## 2023-07-31 RX ADMIN — MYCOPHENOLATE MOFETIL 1000 MG: 250 CAPSULE ORAL at 20:45

## 2023-07-31 RX ADMIN — ACYCLOVIR 400 MG: 400 TABLET ORAL at 20:46

## 2023-07-31 RX ADMIN — IPRATROPIUM BROMIDE AND ALBUTEROL SULFATE 1 DOSE: .5; 2.5 SOLUTION RESPIRATORY (INHALATION) at 22:09

## 2023-07-31 RX ADMIN — BUMETANIDE 1 MG: 1 TABLET ORAL at 12:43

## 2023-07-31 RX ADMIN — LEVOTHYROXINE SODIUM 50 MCG: 0.05 TABLET ORAL at 09:34

## 2023-07-31 RX ADMIN — IPRATROPIUM BROMIDE AND ALBUTEROL SULFATE 1 DOSE: .5; 2.5 SOLUTION RESPIRATORY (INHALATION) at 06:15

## 2023-07-31 RX ADMIN — ACETYLCYSTEINE 400 MG: 100 INHALANT RESPIRATORY (INHALATION) at 18:04

## 2023-07-31 RX ADMIN — BUDESONIDE INHALATION 500 MCG: 0.5 SUSPENSION RESPIRATORY (INHALATION) at 18:04

## 2023-07-31 RX ADMIN — ALLOPURINOL 100 MG: 100 TABLET ORAL at 09:33

## 2023-07-31 RX ADMIN — METOPROLOL SUCCINATE 12.5 MG: 25 TABLET, EXTENDED RELEASE ORAL at 20:46

## 2023-07-31 RX ADMIN — APIXABAN 2.5 MG: 2.5 TABLET, FILM COATED ORAL at 09:34

## 2023-07-31 RX ADMIN — IPRATROPIUM BROMIDE AND ALBUTEROL SULFATE 1 DOSE: .5; 2.5 SOLUTION RESPIRATORY (INHALATION) at 10:15

## 2023-07-31 RX ADMIN — CYCLOSPORINE 100 MG: 25 CAPSULE, GELATIN COATED ORAL at 20:46

## 2023-07-31 RX ADMIN — ARFORMOTEROL TARTRATE 15 MCG: 15 SOLUTION RESPIRATORY (INHALATION) at 06:15

## 2023-07-31 RX ADMIN — IPRATROPIUM BROMIDE AND ALBUTEROL SULFATE 1 DOSE: .5; 2.5 SOLUTION RESPIRATORY (INHALATION) at 18:04

## 2023-07-31 RX ADMIN — CALCITRIOL CAPSULES 0.25 MCG 0.25 MCG: 0.25 CAPSULE ORAL at 20:46

## 2023-07-31 RX ADMIN — ACETYLCYSTEINE 400 MG: 100 INHALANT RESPIRATORY (INHALATION) at 06:15

## 2023-07-31 RX ADMIN — FOLIC ACID 1 MG: 1 TABLET ORAL at 09:34

## 2023-07-31 RX ADMIN — MYCOPHENOLATE MOFETIL 1000 MG: 250 CAPSULE ORAL at 09:33

## 2023-07-31 RX ADMIN — ATORVASTATIN CALCIUM 10 MG: 20 TABLET, FILM COATED ORAL at 09:34

## 2023-07-31 RX ADMIN — ACETYLCYSTEINE 400 MG: 100 INHALANT RESPIRATORY (INHALATION) at 10:15

## 2023-07-31 RX ADMIN — IPRATROPIUM BROMIDE AND ALBUTEROL SULFATE 1 DOSE: .5; 2.5 SOLUTION RESPIRATORY (INHALATION) at 01:02

## 2023-07-31 RX ADMIN — Medication 10 ML: at 09:35

## 2023-07-31 RX ADMIN — ACYCLOVIR 400 MG: 400 TABLET ORAL at 09:37

## 2023-07-31 RX ADMIN — METOPROLOL SUCCINATE 12.5 MG: 25 TABLET, EXTENDED RELEASE ORAL at 09:34

## 2023-07-31 ASSESSMENT — PAIN SCALES - GENERAL: PAINLEVEL_OUTOF10: 0

## 2023-08-01 ENCOUNTER — APPOINTMENT (OUTPATIENT)
Dept: GENERAL RADIOLOGY | Age: 68
DRG: 291 | End: 2023-08-01
Payer: MEDICARE

## 2023-08-01 VITALS
WEIGHT: 225.9 LBS | OXYGEN SATURATION: 94 % | DIASTOLIC BLOOD PRESSURE: 55 MMHG | HEIGHT: 62 IN | SYSTOLIC BLOOD PRESSURE: 121 MMHG | BODY MASS INDEX: 41.57 KG/M2 | TEMPERATURE: 97.3 F | HEART RATE: 77 BPM | RESPIRATION RATE: 18 BRPM

## 2023-08-01 PROCEDURE — 6360000002 HC RX W HCPCS: Performed by: INTERNAL MEDICINE

## 2023-08-01 PROCEDURE — 99239 HOSP IP/OBS DSCHRG MGMT >30: CPT | Performed by: INTERNAL MEDICINE

## 2023-08-01 PROCEDURE — 71045 X-RAY EXAM CHEST 1 VIEW: CPT

## 2023-08-01 PROCEDURE — 6360000002 HC RX W HCPCS: Performed by: FAMILY MEDICINE

## 2023-08-01 PROCEDURE — 6370000000 HC RX 637 (ALT 250 FOR IP): Performed by: FAMILY MEDICINE

## 2023-08-01 PROCEDURE — 94640 AIRWAY INHALATION TREATMENT: CPT

## 2023-08-01 PROCEDURE — 2580000003 HC RX 258: Performed by: INTERNAL MEDICINE

## 2023-08-01 PROCEDURE — 2580000003 HC RX 258: Performed by: FAMILY MEDICINE

## 2023-08-01 PROCEDURE — 99232 SBSQ HOSP IP/OBS MODERATE 35: CPT | Performed by: INTERNAL MEDICINE

## 2023-08-01 PROCEDURE — 94669 MECHANICAL CHEST WALL OSCILL: CPT

## 2023-08-01 PROCEDURE — 94660 CPAP INITIATION&MGMT: CPT

## 2023-08-01 PROCEDURE — 2700000000 HC OXYGEN THERAPY PER DAY

## 2023-08-01 PROCEDURE — 6370000000 HC RX 637 (ALT 250 FOR IP): Performed by: INTERNAL MEDICINE

## 2023-08-01 RX ORDER — BUMETANIDE 1 MG/1
1 TABLET ORAL DAILY
Qty: 30 TABLET | Refills: 3 | Status: SHIPPED | OUTPATIENT
Start: 2023-08-01

## 2023-08-01 RX ORDER — DESVENLAFAXINE 100 MG/1
100 TABLET, EXTENDED RELEASE ORAL EVERY EVENING
Qty: 30 TABLET | Refills: 0 | Status: SHIPPED | OUTPATIENT
Start: 2023-08-01

## 2023-08-01 RX ORDER — IPRATROPIUM BROMIDE AND ALBUTEROL SULFATE 2.5; .5 MG/3ML; MG/3ML
3 SOLUTION RESPIRATORY (INHALATION)
Qty: 360 ML | Refills: 0 | Status: SHIPPED | OUTPATIENT
Start: 2023-08-01

## 2023-08-01 RX ORDER — BUDESONIDE 0.5 MG/2ML
0.5 INHALANT ORAL
Qty: 60 EACH | Refills: 3 | Status: SHIPPED | OUTPATIENT
Start: 2023-08-01

## 2023-08-01 RX ORDER — ARFORMOTEROL TARTRATE 15 UG/2ML
15 SOLUTION RESPIRATORY (INHALATION)
Qty: 120 ML | Refills: 3 | Status: SHIPPED | OUTPATIENT
Start: 2023-08-01

## 2023-08-01 RX ORDER — ATORVASTATIN CALCIUM 10 MG/1
10 TABLET, FILM COATED ORAL DAILY
Qty: 30 TABLET | Refills: 3 | Status: SHIPPED | OUTPATIENT
Start: 2023-08-02

## 2023-08-01 RX ORDER — METOPROLOL SUCCINATE 25 MG/1
12.5 TABLET, EXTENDED RELEASE ORAL
Qty: 30 TABLET | Refills: 3 | Status: SHIPPED | OUTPATIENT
Start: 2023-08-01

## 2023-08-01 RX ADMIN — BUDESONIDE INHALATION 500 MCG: 0.5 SUSPENSION RESPIRATORY (INHALATION) at 06:19

## 2023-08-01 RX ADMIN — ACYCLOVIR 400 MG: 400 TABLET ORAL at 11:05

## 2023-08-01 RX ADMIN — LEVOTHYROXINE SODIUM 50 MCG: 0.05 TABLET ORAL at 11:05

## 2023-08-01 RX ADMIN — MYCOPHENOLATE MOFETIL 1000 MG: 250 CAPSULE ORAL at 11:05

## 2023-08-01 RX ADMIN — FOLIC ACID 1 MG: 1 TABLET ORAL at 11:04

## 2023-08-01 RX ADMIN — CYCLOSPORINE 100 MG: 25 CAPSULE, GELATIN COATED ORAL at 11:03

## 2023-08-01 RX ADMIN — ATORVASTATIN CALCIUM 10 MG: 20 TABLET, FILM COATED ORAL at 11:05

## 2023-08-01 RX ADMIN — ARFORMOTEROL TARTRATE 15 MCG: 15 SOLUTION RESPIRATORY (INHALATION) at 06:18

## 2023-08-01 RX ADMIN — ALLOPURINOL 100 MG: 100 TABLET ORAL at 11:04

## 2023-08-01 RX ADMIN — APIXABAN 2.5 MG: 2.5 TABLET, FILM COATED ORAL at 11:05

## 2023-08-01 RX ADMIN — METOPROLOL SUCCINATE 12.5 MG: 25 TABLET, EXTENDED RELEASE ORAL at 11:03

## 2023-08-01 RX ADMIN — IPRATROPIUM BROMIDE AND ALBUTEROL SULFATE 1 DOSE: .5; 2.5 SOLUTION RESPIRATORY (INHALATION) at 06:19

## 2023-08-01 RX ADMIN — IPRATROPIUM BROMIDE AND ALBUTEROL SULFATE 1 DOSE: .5; 2.5 SOLUTION RESPIRATORY (INHALATION) at 09:55

## 2023-08-01 RX ADMIN — ACETYLCYSTEINE 400 MG: 100 INHALANT RESPIRATORY (INHALATION) at 06:18

## 2023-08-01 RX ADMIN — PIPERACILLIN AND TAZOBACTAM 3375 MG: 3; .375 INJECTION, POWDER, LYOPHILIZED, FOR SOLUTION INTRAVENOUS at 06:09

## 2023-08-01 RX ADMIN — ACETYLCYSTEINE 400 MG: 100 INHALANT RESPIRATORY (INHALATION) at 09:55

## 2023-08-01 RX ADMIN — Medication 10 ML: at 11:06

## 2023-08-01 NOTE — DISCHARGE INSTR - COC
Continuity of Care Form    Patient Name: Praveena Garcia   :  1955  MRN:  [id number]    Admit date:  2023  Discharge date:  2023    Code Status Order: Full Code   Advance Directives:     Admitting Physician:  Ilia Puckett DO  PCP: Segundo Jama MD    Discharging Nurse: [facility] Unit/Room#: 2047/0203-40  Discharging Unit Phone Number: [phone]    Emergency Contact:   Extended Emergency Contact Information  Primary Emergency Contact: Mayur Cheema   [facility] [address] [address] Phone: [phone]  Mobile Phone: [phone]  Relation: Child   needed? No  Secondary Emergency Contact: [address] Phone: [phone]  Mobile Phone: [phone]  Relation: Other Relative  Preferred language: English   needed?  No    Past Surgical History:  Past Surgical History:   Procedure Laterality Date    CATARACT REMOVAL Right 2023    approx     SECTION  1985    one normal delivery    COLECTOMY N/A 01/15/2022    DIAGNOSTIC  LAPAROSCOPY LYSIS OF ADHESIONS performed by Lucina Valerio MD at [address] [place] [address] CATH LAB PROCEDURE      normal coronaries and  normal coronaries    DIALYSIS FISTULA CREATION  march and 2012    phase one and two patient will start dialysis when needed     [facility] [address]    transfemoral venous bypass grafts    IR IVC FILTER PLACEMENT W IMAGING  2022    IR IVC FILTER PLACEMENT W IMAGING 2022 [facility] SPECIAL PROCEDURES    KIDNEY TRANSPLANT  2016    KIDNEY TRANSPLANT  2015    KIDNEY TRANSPLANT  2015    PARATHYROIDECTOMY  2006    left parathyroid  implant and parathyroidectomy    TRANSESOPHAGEAL ECHOCARDIOGRAM N/A 2022    TRANSESOPHAGEAL ECHOCARDIOGRAM WITH BUBBLE STUDY performed by Gabriela Mims MD at [facility] ENDOSCOPY       Immunization History:   Immunization History   Administered Date(s) Administered    COVID-19, PFIZER Bivalent, DO NOT Dilute, (age 12y+),

## 2023-08-01 NOTE — CARE COORDINATION
7/25/2023 1545 CM for transition of care needs at d/c. Pt plan was to return home to her apt at the [facility] and she had declined [address] and SNF. Pt had a decline in status and was having resp distress and ABG's were drawn and pt was placed on Bipap. CM will follow for needs and d/c plan.   Electronically signed by Mateo Jean RN on 7/25/2023 at 3:55 PM
7/26/2023 1505 CM for transition of care needs at d/c. When pt was interviewed initially she states she was going home with no HHC and didn't need SNF placement either. Pt has become increasingly weak and had resp problems yesterday 7/25 and was placed on Bipap, pt's ABG's got worse overnight and was transferred to ICU this am.  CM attempted to call pt's son Kim Yee ([phone])UU discuss d/c plan, VM was left for him. SARA will follow.  Electronically signed by Dewayne Nam RN on 7/26/2023 at 3:13 PM
7/27/2023 ICU (7/26/23) due to respiratory failure. Isolation Rhinovirus, Continuous bipap,  AMS, CHF,  IV Zithromax, Solumedrol and Zosyn. Unclear discharge plan- need PT/OT when pt's respiratory status improves. AMS- son decision maker. Kidney transplant- no current HD- arms prepared for potential fistula needs. Requested Huseyin Barrera with LTACH to follow. CM following.  Electronically signed by Lex Sanchez RN-BC on 7/27/2023 at 9:33 AM
7/28/2023 ICU, Isolation Bipap, IV Zithromax, Solumedrol and Zosyn. Rhinovirus. [place] referral to [facility]- to review/follow. Kidney transplant- no current HD- arms prepared for potential fistula needs. Cm following.  Electronically signed by TUAN Rubi on 7/28/2023 at 8:22 AM
7/31/2023 1430 Isolation Rhinovirus. CM for transition of care needs at d/c. Pt has been accepted to [facility] per [facility] and have a bed ready when pt is medically stable for discharge. NO PRECERT Needed. CM will follow.  Electronically signed by Sergio Medina RN on 7/31/2023 at 2:40 PM
8/1/2023 1210 CM for transition of care needs at d/c. Pt is being discharged today to [facility], [address] Needed. [facility] scheduled to transport at 1400 via stretcher, ambulance form completed and placed with soft chart. GINA needs signed. Pt's son Chaz Davis notified of d/c and time. Denise liaison aware of  time. CM will follow.  Electronically signed by Monik Gonzáles RN on 8/1/2023 at 12:21 PM
Issues/Barriers to RETURNING to current housing: none  Potential Assistance needed at discharge: N/A            Potential DME:    Patient expects to discharge to:  [address] for transportation at discharge:      Financial    Payor: [address] / Plan: MEDICARE PART A AND B / Product Type: *No Product type* /     Does insurance require precert for SNF: No    Potential assistance Purchasing Medications:    Meds-to-Beds request: Yes      [id number] [place] Past[place] [facility] [address] [facility] [phone] - F [phone] [address]85 [address] [facility] [address]  Phone: [phone] Fax: [facility] [facility] [address] [facility] - [address] [facility] [phone]  [phone] Pending sale to [facility] [address]  Phone: [phone] Fax: [phone]      Notes:    Factors facilitating achievement of predicted outcomes: Family support and Knowledge about rehab    Barriers to discharge: none    Additional Case Management Notes: 7/24/2023 1620 CM  met with pt at the bedside for transition of care needs at d/c. Pt resides alone in an apt at the [facility] on the 9th floor with elevator access. Pt has a walker, rollator, cane and a shower chair. Pt states she independent and drives, she cooks and cleans. Pt has a history with [facility] and has been at [facility] and [address] [facility] and [address] [place]. Pt declines HHC or need to go to SNF. Pt is wearing O2 but doesn't have Home O2, would need a walk test to qualify. PCP is Dr Anupam Doe and uses [facility] on [address]. CM will follow.  Electronically signed by Yolanda Stallworth RN on 7/24/2023 at 4:36 PM                 Yolanda Stallworth RN  Case Management Department

## 2023-08-02 LAB — CYCLOSPORINE BLD-MCNC: 122 UG/L (ref 50–300)

## 2023-08-03 ASSESSMENT — EJECTION FRACTION
EF_SOURCE: 2D ECHO
EF_VALUE: 55

## 2023-08-04 ENCOUNTER — TELEPHONE (OUTPATIENT)
Dept: CARDIOLOGY CLINIC | Age: 68
End: 2023-08-04

## 2023-08-04 NOTE — TELEPHONE ENCOUNTER
Caesar White at [facility] stated they don't routinely transport patients to routine apt. Will reschedule when patients  discharged.  Scheduled with Debbie Maciel at [address] [place] [address] 8/7/23 at 10 AM. CF

## 2023-08-07 ENCOUNTER — HOSPITAL ENCOUNTER (OUTPATIENT)
Dept: OTHER | Age: 68
Setting detail: THERAPIES SERIES
Discharge: HOME | End: 2023-08-07

## 2023-08-11 NOTE — PROGRESS NOTES
[facility] appointment for 8/14 d/t still in rehab and does not transfer. Pt will potentially be discharged next week sometime however does not know a date.  Rescheduled for:       Future Appointments   Date Time Provider [address]   8/22/2023 11:45 AM [facility] CHF ROOM 2 [facility] Marissa Cushing   8/29/2023  8:15 AM [facility]

## 2023-08-14 ENCOUNTER — HOSPITAL ENCOUNTER (OUTPATIENT)
Dept: OTHER | Age: 68
Discharge: HOME | End: 2023-08-14

## 2023-08-22 ENCOUNTER — HOSPITAL ENCOUNTER (OUTPATIENT)
Dept: OTHER | Age: 68
Setting detail: THERAPIES SERIES
Discharge: HOME | End: 2023-08-22
Payer: MEDICARE

## 2023-08-22 ENCOUNTER — HOSPITAL ENCOUNTER (OUTPATIENT)
Age: 68
Discharge: HOME | End: 2023-08-22
Payer: MEDICARE

## 2023-08-22 VITALS
WEIGHT: 202.2 LBS | RESPIRATION RATE: 16 BRPM | OXYGEN SATURATION: 96 % | DIASTOLIC BLOOD PRESSURE: 75 MMHG | BODY MASS INDEX: 36.98 KG/M2 | HEART RATE: 76 BPM | SYSTOLIC BLOOD PRESSURE: 139 MMHG

## 2023-08-22 DIAGNOSIS — M81.0 AGE-RELATED OSTEOPOROSIS WITHOUT CURRENT PATHOLOGICAL FRACTURE: ICD-10-CM

## 2023-08-22 DIAGNOSIS — F31.9 BIPOLAR 1 DISORDER (HCC): ICD-10-CM

## 2023-08-22 DIAGNOSIS — E78.2 MIXED HYPERLIPIDEMIA: ICD-10-CM

## 2023-08-22 DIAGNOSIS — E03.9 ACQUIRED HYPOTHYROIDISM: ICD-10-CM

## 2023-08-22 DIAGNOSIS — E66.01 SEVERE OBESITY (BMI 35.0-39.9) WITH COMORBIDITY (HCC): ICD-10-CM

## 2023-08-22 DIAGNOSIS — N18.32 STAGE 3B CHRONIC KIDNEY DISEASE (HCC): ICD-10-CM

## 2023-08-22 LAB
25(OH)D3 SERPL-MCNC: 41.4 NG/ML (ref 30–100)
ALBUMIN SERPL-MCNC: 4.4 G/DL (ref 3.5–5.2)
ALP SERPL-CCNC: 73 U/L (ref 35–104)
ALT SERPL-CCNC: 10 U/L (ref 0–32)
ANION GAP SERPL CALCULATED.3IONS-SCNC: 14 MMOL/L (ref 7–16)
AST SERPL-CCNC: 16 U/L (ref 0–31)
BACTERIA URNS QL MICRO: ABNORMAL
BASOPHILS # BLD: 0.03 K/UL (ref 0–0.2)
BASOPHILS NFR BLD: 0 % (ref 0–2)
BILIRUB SERPL-MCNC: 0.4 MG/DL (ref 0–1.2)
BILIRUB UR QL STRIP: NEGATIVE
BNP SERPL-MCNC: 2270 PG/ML (ref 0–450)
BUN SERPL-MCNC: 32 MG/DL (ref 6–23)
CALCIUM SERPL-MCNC: 10.9 MG/DL (ref 8.6–10.2)
CHLORIDE SERPL-SCNC: 100 MMOL/L (ref 98–107)
CHOLEST SERPL-MCNC: 242 MG/DL
CLARITY UR: CLEAR
CO2 SERPL-SCNC: 24 MMOL/L (ref 22–29)
COLOR UR: YELLOW
CREAT SERPL-MCNC: 1.6 MG/DL (ref 0.5–1)
CREAT UR-MCNC: 19.9 MG/DL (ref 29–226)
EOSINOPHIL # BLD: 0.08 K/UL (ref 0.05–0.5)
EOSINOPHILS RELATIVE PERCENT: 1 % (ref 0–6)
EPI CELLS #/AREA URNS HPF: ABNORMAL /HPF
ERYTHROCYTE [DISTWIDTH] IN BLOOD BY AUTOMATED COUNT: 15.1 % (ref 11.5–15)
FERRITIN SERPL-MCNC: 522 NG/ML
GFR SERPL CREATININE-BSD FRML MDRD: 34 ML/MIN/1.73M2
GLUCOSE SERPL-MCNC: 134 MG/DL (ref 74–99)
GLUCOSE UR STRIP-MCNC: NEGATIVE MG/DL
HCT VFR BLD AUTO: 39.5 % (ref 34–48)
HDLC SERPL-MCNC: 66 MG/DL
HGB BLD-MCNC: 12.2 G/DL (ref 11.5–15.5)
HGB UR QL STRIP.AUTO: NEGATIVE
IMM GRANULOCYTES # BLD AUTO: 0.05 K/UL (ref 0–0.58)
IMM GRANULOCYTES NFR BLD: 1 % (ref 0–5)
IRON SATN MFR SERPL: 22 % (ref 15–50)
IRON SERPL-MCNC: 63 UG/DL (ref 37–145)
KETONES UR STRIP-MCNC: NEGATIVE MG/DL
LDLC SERPL CALC-MCNC: 147 MG/DL
LEUKOCYTE ESTERASE UR QL STRIP: ABNORMAL
LYMPHOCYTES NFR BLD: 0.66 K/UL (ref 1.5–4)
LYMPHOCYTES RELATIVE PERCENT: 8 % (ref 20–42)
MAGNESIUM SERPL-MCNC: 1.7 MG/DL (ref 1.6–2.6)
MCH RBC QN AUTO: 34.5 PG (ref 26–35)
MCHC RBC AUTO-ENTMCNC: 30.9 G/DL (ref 32–34.5)
MCV RBC AUTO: 111.6 FL (ref 80–99.9)
MONOCYTES NFR BLD: 0.54 K/UL (ref 0.1–0.95)
MONOCYTES NFR BLD: 7 % (ref 2–12)
NEUTROPHILS NFR BLD: 83 % (ref 43–80)
NEUTS SEG NFR BLD: 6.63 K/UL (ref 1.8–7.3)
NITRITE UR QL STRIP: NEGATIVE
PH UR STRIP: 5.5 [PH] (ref 5–9)
PHOSPHATE SERPL-MCNC: 2.6 MG/DL (ref 2.5–4.5)
PLATELET # BLD AUTO: 178 K/UL (ref 130–450)
PMV BLD AUTO: 13 FL (ref 7–12)
POTASSIUM SERPL-SCNC: 4.9 MMOL/L (ref 3.5–5)
PROT SERPL-MCNC: 7.3 G/DL (ref 6.4–8.3)
PROT UR STRIP-MCNC: NEGATIVE MG/DL
PTH-INTACT SERPL-MCNC: 112.4 PG/ML (ref 15–65)
RBC # BLD AUTO: 3.54 M/UL (ref 3.5–5.5)
RBC # BLD: ABNORMAL 10*6/UL
RBC #/AREA URNS HPF: ABNORMAL /HPF
SODIUM SERPL-SCNC: 138 MMOL/L (ref 132–146)
SP GR UR STRIP: 1.01 (ref 1–1.03)
TIBC SERPL-MCNC: 293 UG/DL (ref 250–450)
TOTAL PROTEIN, URINE: 4 MG/DL (ref 0–12)
TRIGL SERPL-MCNC: 143 MG/DL
URATE SERPL-MCNC: 8 MG/DL (ref 2.4–5.7)
URINE TOTAL PROTEIN CREATININE RATIO: 0.2 (ref 0–0.2)
UROBILINOGEN UR STRIP-ACNC: 0.2 EU/DL (ref 0–1)
VLDLC SERPL CALC-MCNC: 29 MG/DL
WBC #/AREA URNS HPF: ABNORMAL /HPF
WBC OTHER # BLD: 8.3 K/UL (ref 4.5–11.5)

## 2023-08-22 PROCEDURE — 99214 OFFICE O/P EST MOD 30 MIN: CPT

## 2023-08-22 PROCEDURE — 83550 IRON BINDING TEST: CPT

## 2023-08-22 PROCEDURE — 84100 ASSAY OF PHOSPHORUS: CPT

## 2023-08-22 PROCEDURE — 36415 COLL VENOUS BLD VENIPUNCTURE: CPT

## 2023-08-22 PROCEDURE — 81001 URINALYSIS AUTO W/SCOPE: CPT

## 2023-08-22 PROCEDURE — 80053 COMPREHEN METABOLIC PANEL: CPT

## 2023-08-22 PROCEDURE — 80158 DRUG ASSAY CYCLOSPORINE: CPT

## 2023-08-22 PROCEDURE — 83880 ASSAY OF NATRIURETIC PEPTIDE: CPT

## 2023-08-22 PROCEDURE — 83735 ASSAY OF MAGNESIUM: CPT

## 2023-08-22 PROCEDURE — 85025 COMPLETE CBC W/AUTO DIFF WBC: CPT

## 2023-08-22 PROCEDURE — 82570 ASSAY OF URINE CREATININE: CPT

## 2023-08-22 PROCEDURE — 80061 LIPID PANEL: CPT

## 2023-08-22 PROCEDURE — 83970 ASSAY OF PARATHORMONE: CPT

## 2023-08-22 PROCEDURE — 83540 ASSAY OF IRON: CPT

## 2023-08-22 PROCEDURE — 82306 VITAMIN D 25 HYDROXY: CPT

## 2023-08-22 PROCEDURE — 82728 ASSAY OF FERRITIN: CPT

## 2023-08-22 PROCEDURE — 84156 ASSAY OF PROTEIN URINE: CPT

## 2023-08-22 PROCEDURE — 84550 ASSAY OF BLOOD/URIC ACID: CPT

## 2023-08-22 RX ORDER — SIMVASTATIN 20 MG
20 TABLET ORAL NIGHTLY
COMMUNITY

## 2023-08-22 RX ORDER — VANCOMYCIN HYDROCHLORIDE 125 MG/1
125 CAPSULE ORAL SEE ADMIN INSTRUCTIONS
COMMUNITY

## 2023-08-22 RX ORDER — ACYCLOVIR 400 MG/1
400 TABLET ORAL 2 TIMES DAILY
COMMUNITY

## 2023-08-22 NOTE — PROGRESS NOTES
Congestive Heart Failure [address] [facility] Arya Fouzia   1955    Referring Provider: Dr. Baldev Holland  Primary Care Physician: Dr. Shena Aguayo  Cardiologist: Dr. Richard Sy  Nephrologist: Dr. Zach Munoz      History of Present Illness:     Clari Felipe is a 80 y.o. female with a history of HFpEF, most recent EF 55% 7/24/23. Patient Story:    She does not  have dyspnea with exertion, shortness of breath, or decline in overall functional capacity. She does not have orthopnea, PND, nocturnal cough or hemoptysis. She does not have abdominal distention or bloating, early satiety, anorexia/change in appetite. She does has a good urinary response to oral diuretic. She does have lower extremity edema. She denies lightheadedness, dizziness. She denies palpitations, syncope or near syncope. She does not complain of chest pain, pressure, discomfort.          Allergies   Allergen Reactions    Cipro [Ciprofloxacin Hcl]     Macrodantin [Nitrofurantoin Macrocrystal] Nausea Only    Sulfa Antibiotics Nausea Only         Outpatient Medications Marked as Taking for the 8/22/23 encounter [facility] Encounter) with [facility] CHF ROOM 2   Medication Sig Dispense Refill    simvastatin (ZOCOR) 20 MG tablet Take 1 tablet by mouth nightly      vancomycin (VANCOCIN) 125 MG capsule Take 1 capsule by mouth See Admin Instructions 3 times weekly MWF      acyclovir (ZOVIRAX) 400 MG tablet Take 1 tablet by mouth 2 times daily       Current Outpatient Medications on File Prior to Encounter   Medication Sig Dispense Refill    simvastatin (ZOCOR) 20 MG tablet Take 1 tablet by mouth nightly      vancomycin (VANCOCIN) 125 MG capsule Take 1 capsule by mouth See Admin Instructions 3 times weekly MWF      acyclovir (ZOVIRAX) 400 MG tablet Take 1 tablet by mouth 2 times daily      bumetanide (BUMEX) 1 MG tablet Take 1 tablet by mouth daily 30 tablet 3    metoprolol succinate (TOPROL XL) 25 MG extended

## 2023-08-23 ENCOUNTER — TELEPHONE (OUTPATIENT)
Dept: INTERNAL MEDICINE CLINIC | Age: 68
End: 2023-08-23

## 2023-08-23 NOTE — TELEPHONE ENCOUNTER
RN called from [facility] care. Pt was dicharged on eliquis 2.5mg, states pt has the bottles that say 5 mg.  What should she be on. TAHMINA Medrano

## 2023-08-23 NOTE — TELEPHONE ENCOUNTER
Patient should be taking Eliquis 5 mg 1 by mouth twice a day as noted in the previous note. Patient has a history of lower extremity DVT.       Dr. Artemio Dominguez

## 2023-08-29 ENCOUNTER — OFFICE VISIT (OUTPATIENT)
Dept: INTERNAL MEDICINE CLINIC | Age: 68
End: 2023-08-29
Payer: MEDICARE

## 2023-08-29 VITALS
SYSTOLIC BLOOD PRESSURE: 142 MMHG | DIASTOLIC BLOOD PRESSURE: 84 MMHG | HEIGHT: 62 IN | WEIGHT: 203 LBS | BODY MASS INDEX: 37.36 KG/M2 | HEART RATE: 86 BPM | TEMPERATURE: 97.5 F | OXYGEN SATURATION: 97 %

## 2023-08-29 DIAGNOSIS — E03.9 ACQUIRED HYPOTHYROIDISM: ICD-10-CM

## 2023-08-29 DIAGNOSIS — I50.22 CHRONIC SYSTOLIC (CONGESTIVE) HEART FAILURE (HCC): ICD-10-CM

## 2023-08-29 DIAGNOSIS — Z86.010 HISTORY OF COLONIC POLYPS: ICD-10-CM

## 2023-08-29 DIAGNOSIS — I10 PRIMARY HYPERTENSION: Primary | ICD-10-CM

## 2023-08-29 DIAGNOSIS — E78.2 MIXED HYPERLIPIDEMIA: ICD-10-CM

## 2023-08-29 DIAGNOSIS — N18.32 STAGE 3B CHRONIC KIDNEY DISEASE (HCC): ICD-10-CM

## 2023-08-29 DIAGNOSIS — F31.9 BIPOLAR 1 DISORDER (HCC): ICD-10-CM

## 2023-08-29 DIAGNOSIS — I82.519 CHRONIC DEEP VEIN THROMBOSIS (DVT) OF FEMORAL VEIN, UNSPECIFIED LATERALITY (HCC): ICD-10-CM

## 2023-08-29 PROCEDURE — 3079F DIAST BP 80-89 MM HG: CPT | Performed by: NEUROMUSCULOSKELETAL MEDICINE & OMM

## 2023-08-29 PROCEDURE — 99214 OFFICE O/P EST MOD 30 MIN: CPT | Performed by: NEUROMUSCULOSKELETAL MEDICINE & OMM

## 2023-08-29 PROCEDURE — G8417 CALC BMI ABV UP PARAM F/U: HCPCS | Performed by: NEUROMUSCULOSKELETAL MEDICINE & OMM

## 2023-08-29 PROCEDURE — G8399 PT W/DXA RESULTS DOCUMENT: HCPCS | Performed by: NEUROMUSCULOSKELETAL MEDICINE & OMM

## 2023-08-29 PROCEDURE — G8427 DOCREV CUR MEDS BY ELIG CLIN: HCPCS | Performed by: NEUROMUSCULOSKELETAL MEDICINE & OMM

## 2023-08-29 PROCEDURE — 3017F COLORECTAL CA SCREEN DOC REV: CPT | Performed by: NEUROMUSCULOSKELETAL MEDICINE & OMM

## 2023-08-29 PROCEDURE — 3077F SYST BP >= 140 MM HG: CPT | Performed by: NEUROMUSCULOSKELETAL MEDICINE & OMM

## 2023-08-29 PROCEDURE — 1036F TOBACCO NON-USER: CPT | Performed by: NEUROMUSCULOSKELETAL MEDICINE & OMM

## 2023-08-29 PROCEDURE — 1123F ACP DISCUSS/DSCN MKR DOCD: CPT | Performed by: NEUROMUSCULOSKELETAL MEDICINE & OMM

## 2023-08-29 PROCEDURE — 1111F DSCHRG MED/CURRENT MED MERGE: CPT | Performed by: NEUROMUSCULOSKELETAL MEDICINE & OMM

## 2023-08-29 PROCEDURE — 1090F PRES/ABSN URINE INCON ASSESS: CPT | Performed by: NEUROMUSCULOSKELETAL MEDICINE & OMM

## 2023-08-29 RX ORDER — PREDNISONE 5 MG/1
5 TABLET ORAL DAILY
COMMUNITY

## 2023-08-29 RX ORDER — SIMVASTATIN 40 MG
40 TABLET ORAL NIGHTLY
Qty: 90 TABLET | Refills: 2 | Status: SHIPPED | OUTPATIENT
Start: 2023-08-29

## 2023-08-29 RX ORDER — DOCUSATE SODIUM 100 MG/1
100 CAPSULE, LIQUID FILLED ORAL 2 TIMES DAILY
COMMUNITY

## 2023-08-29 ASSESSMENT — ENCOUNTER SYMPTOMS
WHEEZING: 0
VOMITING: 0
NAUSEA: 0
CHOKING: 0
CHEST TIGHTNESS: 0
BLOOD IN STOOL: 0
CONSTIPATION: 0
COUGH: 0
SHORTNESS OF BREATH: 0
ABDOMINAL DISTENTION: 0
BACK PAIN: 0
ABDOMINAL PAIN: 0
DIARRHEA: 0

## 2023-08-29 NOTE — PROGRESS NOTES
Conjunctivae normal.   Neck:      Vascular: No carotid bruit. Cardiovascular:      Rate and Rhythm: Normal rate and regular rhythm. Pulses: Normal pulses. Heart sounds: Normal heart sounds. No murmur heard. No friction rub. No gallop. Pulmonary:      Effort: Pulmonary effort is normal. No respiratory distress. Breath sounds: Normal breath sounds. No stridor. No wheezing, rhonchi or rales. Abdominal:      General: Bowel sounds are normal. There is no distension. Palpations: Abdomen is soft. There is no mass. Musculoskeletal:      Right lower leg: No edema. Left lower leg: No edema. Lymphadenopathy:      Cervical: No cervical adenopathy. Skin:     Findings: No lesion or rash. Neurological:      General: No focal deficit present. Mental Status: She is alert and oriented to person, place, and time. Mental status is at baseline. Psychiatric:         Mood and Affect: Mood normal.         Behavior: Behavior normal.       Health Maintenance Due   Topic Date Due    Pneumococcal 65+ years Vaccine (1 - PCV) Never done    Hepatitis C screen  Never done    DTaP/Tdap/Td vaccine (1 - Tdap) Never done    Low dose CT lung screening &/or counseling  Never done    A1C test (Diabetic or Prediabetic)  11/17/2022    Flu vaccine (1) 08/01/2023   Last colonoscopy was done May 2021 showed long and twisted colon with diverticulosis and 1 hyperplastic polyp to be repeated in 5 years by Dr. Desean Rabago  Last mammogram normal on October 5th, 2022  Last dexa bone scan bone scan on 10-5-2022, showed osteoporotic in right femoral neck, right forearm, and left femoral neck. Normal bone density of lumbar spine. Last Cologuard/FIT testing will send to patient's home at today's visit.   Last PSA               Past Medical History:   Diagnosis Date    Abnormal EKG     left bundle branch block    Acute cystitis without hematuria 01/20/2023    Acute cystitis without hematuria 01/20/2023    Acute exacerbation

## 2023-08-31 LAB
BACTERIA: ABNORMAL
BILIRUBIN URINE: NEGATIVE
COLOR: YELLOW
CYCLOSPORINE BLD-MCNC: 123 UG/L (ref 50–300)
EPITHELIAL CELLS UA: ABNORMAL /HPF
GLUCOSE URINE: NEGATIVE MG/DL
KETONES, URINE: NEGATIVE MG/DL
LEUKOCYTE ESTERASE, URINE: ABNORMAL
NITRITE, URINE: NEGATIVE
PH UA: 6 (ref 5–9)
PROTEIN UA: NEGATIVE MG/DL
RBC UA: ABNORMAL /HPF
SPECIFIC GRAVITY UA: 1.01 (ref 1–1.03)
TURBIDITY: CLEAR
URINE HGB: NEGATIVE
UROBILINOGEN, URINE: 0.2 EU/DL (ref 0–1)
WBC UA: ABNORMAL /HPF

## 2023-09-02 LAB
CULTURE: ABNORMAL
SPECIMEN DESCRIPTION: ABNORMAL

## 2023-09-05 ENCOUNTER — HOSPITAL ENCOUNTER (OUTPATIENT)
Age: 68
Discharge: HOME | End: 2023-09-05
Payer: MEDICARE

## 2023-09-05 ENCOUNTER — CLINICAL DOCUMENTATION (OUTPATIENT)
Dept: INFECTIOUS DISEASES | Age: 68
End: 2023-09-05

## 2023-09-05 ENCOUNTER — HOSPITAL ENCOUNTER (OUTPATIENT)
Dept: OTHER | Age: 68
Setting detail: THERAPIES SERIES
Discharge: HOME | End: 2023-09-05
Payer: MEDICARE

## 2023-09-05 VITALS
HEART RATE: 80 BPM | RESPIRATION RATE: 16 BRPM | BODY MASS INDEX: 37.28 KG/M2 | DIASTOLIC BLOOD PRESSURE: 70 MMHG | SYSTOLIC BLOOD PRESSURE: 132 MMHG | OXYGEN SATURATION: 95 % | WEIGHT: 203.8 LBS

## 2023-09-05 LAB
25(OH)D3 SERPL-MCNC: 46.1 NG/ML (ref 30–100)
ALBUMIN SERPL-MCNC: 4.4 G/DL (ref 3.5–5.2)
ANION GAP SERPL CALCULATED.3IONS-SCNC: 12 MMOL/L (ref 7–16)
ANION GAP SERPL CALCULATED.3IONS-SCNC: 12 MMOL/L (ref 7–16)
BASOPHILS # BLD: 0.19 K/UL (ref 0–0.2)
BASOPHILS NFR BLD: 2 % (ref 0–2)
BNP SERPL-MCNC: 1726 PG/ML (ref 0–450)
BUN SERPL-MCNC: 34 MG/DL (ref 6–23)
BUN SERPL-MCNC: 34 MG/DL (ref 6–23)
CALCIUM SERPL-MCNC: 10.8 MG/DL (ref 8.6–10.2)
CALCIUM SERPL-MCNC: 10.8 MG/DL (ref 8.6–10.2)
CHLORIDE SERPL-SCNC: 99 MMOL/L (ref 98–107)
CHLORIDE SERPL-SCNC: 99 MMOL/L (ref 98–107)
CO2 SERPL-SCNC: 27 MMOL/L (ref 22–29)
CO2 SERPL-SCNC: 27 MMOL/L (ref 22–29)
CREAT SERPL-MCNC: 1.6 MG/DL (ref 0.5–1)
CREAT SERPL-MCNC: 1.6 MG/DL (ref 0.5–1)
CREAT UR-MCNC: 35.6 MG/DL (ref 29–226)
EOSINOPHIL # BLD: 0 K/UL (ref 0.05–0.5)
EOSINOPHILS RELATIVE PERCENT: 0 % (ref 0–6)
ERYTHROCYTE [DISTWIDTH] IN BLOOD BY AUTOMATED COUNT: 15.3 % (ref 11.5–15)
GFR SERPL CREATININE-BSD FRML MDRD: 36 ML/MIN/1.73M2
GFR SERPL CREATININE-BSD FRML MDRD: 36 ML/MIN/1.73M2
GLUCOSE SERPL-MCNC: 150 MG/DL (ref 74–99)
GLUCOSE SERPL-MCNC: 150 MG/DL (ref 74–99)
HCT VFR BLD AUTO: 38.1 % (ref 34–48)
HGB BLD-MCNC: 11.9 G/DL (ref 11.5–15.5)
LYMPHOCYTES NFR BLD: 0.85 K/UL (ref 1.5–4)
LYMPHOCYTES RELATIVE PERCENT: 8 % (ref 20–42)
MAGNESIUM SERPL-MCNC: 1.8 MG/DL (ref 1.6–2.6)
MCH RBC QN AUTO: 34.7 PG (ref 26–35)
MCHC RBC AUTO-ENTMCNC: 31.2 G/DL (ref 32–34.5)
MCV RBC AUTO: 111.1 FL (ref 80–99.9)
MONOCYTES NFR BLD: 0.09 K/UL (ref 0.1–0.95)
MONOCYTES NFR BLD: 1 % (ref 2–12)
NEUTROPHILS NFR BLD: 90 % (ref 43–80)
NEUTS SEG NFR BLD: 9.67 K/UL (ref 1.8–7.3)
PHOSPHATE SERPL-MCNC: 3.6 MG/DL (ref 2.5–4.5)
PLATELET # BLD AUTO: 146 K/UL (ref 130–450)
PMV BLD AUTO: 12.4 FL (ref 7–12)
POTASSIUM SERPL-SCNC: 4.5 MMOL/L (ref 3.5–5)
POTASSIUM SERPL-SCNC: 4.6 MMOL/L (ref 3.5–5)
PTH-INTACT SERPL-MCNC: 120.1 PG/ML (ref 15–65)
RBC # BLD AUTO: 3.43 M/UL (ref 3.5–5.5)
RBC # BLD: NORMAL 10*6/UL
SODIUM SERPL-SCNC: 138 MMOL/L (ref 132–146)
SODIUM SERPL-SCNC: 138 MMOL/L (ref 132–146)
TOTAL PROTEIN, URINE: 8 MG/DL (ref 0–12)
URINE TOTAL PROTEIN CREATININE RATIO: 0.22 (ref 0–0.2)
WBC OTHER # BLD: 10.8 K/UL (ref 4.5–11.5)

## 2023-09-05 PROCEDURE — 87077 CULTURE AEROBIC IDENTIFY: CPT

## 2023-09-05 PROCEDURE — 36415 COLL VENOUS BLD VENIPUNCTURE: CPT

## 2023-09-05 PROCEDURE — 83880 ASSAY OF NATRIURETIC PEPTIDE: CPT

## 2023-09-05 PROCEDURE — 80048 BASIC METABOLIC PNL TOTAL CA: CPT

## 2023-09-05 PROCEDURE — 84156 ASSAY OF PROTEIN URINE: CPT

## 2023-09-05 PROCEDURE — 80069 RENAL FUNCTION PANEL: CPT

## 2023-09-05 PROCEDURE — 80158 DRUG ASSAY CYCLOSPORINE: CPT

## 2023-09-05 PROCEDURE — 82306 VITAMIN D 25 HYDROXY: CPT

## 2023-09-05 PROCEDURE — 82570 ASSAY OF URINE CREATININE: CPT

## 2023-09-05 PROCEDURE — 87086 URINE CULTURE/COLONY COUNT: CPT

## 2023-09-05 PROCEDURE — 83970 ASSAY OF PARATHORMONE: CPT

## 2023-09-05 PROCEDURE — 85025 COMPLETE CBC W/AUTO DIFF WBC: CPT

## 2023-09-05 PROCEDURE — 83735 ASSAY OF MAGNESIUM: CPT

## 2023-09-05 PROCEDURE — 99214 OFFICE O/P EST MOD 30 MIN: CPT

## 2023-09-05 NOTE — PROGRESS NOTES
Congestive Heart Failure [address]     Referring Provider: Dr. Dominic Orozco  Primary Care Physician: Zamzam Munson DO   Cardiologist: Dr. Tosha Matt  Nephrologist: Dr. Sedonia Spatz:     Ayala Roche is a 80 y.o. (1955) female with a history of HFpEF, most recent EF:  Lab Results   Component Value Date    LVEF 55 07/24/2023    [id number] [facility] [place] Nuclear Medicine Scan 06/16/2015         She presents to the CHF clinic for ongoing evaluation and monitoring of heart failure.     In the CHF clinic today she denies any adverse symptoms except:  [] Dizziness or lightheadedness   [] Syncope or near syncope  [] Recent Fall  [] Shortness of breath at rest   [] Dyspnea with exertion  [] Decline in functional capacity (unable to perform activities they had previously been able to do)  [] Fatigue   [] Orthopnea  [] PND  [] Nocturnal cough  [] Hemoptysis  [] Chest pain, pressure, or discomfort  [] Palpitations  [] Abdominal distention  [] Abdominal bloating  [] Early satiety  [] Blood in stool   [] Diarrhea  [] Constipation  [] Nausea/Vomiting  [] Decreased urinary response to oral diuretic   [] Scrotal swelling   [] Lower extremity edema  [] Used PRN doses of oral diuretic   [] Weight gain    Wt Readings from Last 3 Encounters:   09/05/23 203 lb 12.8 oz (92.4 kg)   08/29/23 203 lb (92.1 kg)   08/22/23 202 lb 3.2 oz (91.7 kg)           SOCIAL HISTORY:  [x] Denies tobacco, alcohol or illicit drug abuse  [] Tobacco use:  [] ETOH use:  [] Illicit drug use:        MEDICATIONS:    Allergies   Allergen Reactions    Cipro [Ciprofloxacin Hcl]     Macrodantin [Nitrofurantoin Macrocrystal] Nausea Only    Sulfa Antibiotics Nausea Only       Current Outpatient Medications:     predniSONE (DELTASONE) 5 MG tablet, Take 1 tablet by mouth daily, Disp: , Rfl:     docusate sodium (COLACE) 100 MG capsule, Take 1 capsule by mouth 2 times daily, Disp: , Rfl:     simvastatin

## 2023-09-05 NOTE — PROGRESS NOTES
[facility]      PT HAD URINE CX DONE   Results       Procedure Component Value Units Date/Time    Culture, Urine [[id number]]     Order Status: No result Specimen: Other     Culture, Urine [[id number]]  (Abnormal)  (Susceptibility) Collected: 08/31/23 1030    Order Status: Completed Updated: 09/02/23 0841     Specimen Description . URINE     Culture KLEBSIELLA OXYTOCA >100,000 CFU/ML Identification by MALDI-TOF      PROTEUS MIRABILIS <10,000 CFU/ML      GRAM NEGATIVE RODS <10,000 CFU/ML    Susceptibility        Klebsiella oxytoca      BACTERIAL SUSCEPTIBILITY PANEL BONNIE      ceFAZolin <=4  Sensitive      cefepime <=0.12  Sensitive      cefotaxime <=0.25  Sensitive      cefOXitin <=4  Sensitive      cefTAZidime <=1  Sensitive      ciprofloxacin <=0.06  Sensitive      levofloxacin 0.25  Sensitive      meropenem <=0.25  Sensitive      nitrofurantoin 32  Sensitive      trimethoprim-sulfamethoxazole >=320  Resistant                           Cologuard (Fecal DNA Colorectal Cancer Screening) [[id number]]     Order Status: Sent Specimen: Stool           MARIA DEL CARMEN IS FEELING OK STARTED LASIX HAD SOME BURNING WITH IT   PT TO  FOLLOW SYMPTOMS   PT HAS APPT ON Friday WITH US    TO CALL IF Cooper Reis MD  3:51 PM

## 2023-09-07 LAB
MICROORGANISM SPEC CULT: ABNORMAL
SPECIMEN DESCRIPTION: ABNORMAL

## 2023-09-08 LAB — CYCLOSPORINE BLD-MCNC: 164 UG/L (ref 50–300)

## 2023-09-11 ENCOUNTER — HOSPITAL ENCOUNTER (OUTPATIENT)
Dept: OTHER | Age: 68
Setting detail: THERAPIES SERIES
Discharge: HOME | End: 2023-09-11
Payer: MEDICARE

## 2023-09-11 ENCOUNTER — TRANSCRIBE ORDERS (OUTPATIENT)
Dept: ADMINISTRATIVE | Age: 68
End: 2023-09-11

## 2023-09-11 VITALS
DIASTOLIC BLOOD PRESSURE: 71 MMHG | OXYGEN SATURATION: 97 % | WEIGHT: 207 LBS | BODY MASS INDEX: 37.86 KG/M2 | SYSTOLIC BLOOD PRESSURE: 144 MMHG | HEART RATE: 80 BPM

## 2023-09-11 DIAGNOSIS — G47.33 OSA (OBSTRUCTIVE SLEEP APNEA): Primary | ICD-10-CM

## 2023-09-11 DIAGNOSIS — Z12.31 ENCOUNTER FOR SCREENING MAMMOGRAM FOR MALIGNANT NEOPLASM OF BREAST: Primary | ICD-10-CM

## 2023-09-11 LAB
ANION GAP SERPL CALCULATED.3IONS-SCNC: 14 MMOL/L (ref 7–16)
BNP SERPL-MCNC: 1667 PG/ML (ref 0–450)
BUN SERPL-MCNC: 32 MG/DL (ref 6–23)
CALCIUM SERPL-MCNC: 10.3 MG/DL (ref 8.6–10.2)
CHLORIDE SERPL-SCNC: 100 MMOL/L (ref 98–107)
CO2 SERPL-SCNC: 24 MMOL/L (ref 22–29)
CREAT SERPL-MCNC: 1.5 MG/DL (ref 0.5–1)
GFR SERPL CREATININE-BSD FRML MDRD: 37 ML/MIN/1.73M2
GLUCOSE SERPL-MCNC: 127 MG/DL (ref 74–99)
POTASSIUM SERPL-SCNC: 4.6 MMOL/L (ref 3.5–5)
SODIUM SERPL-SCNC: 138 MMOL/L (ref 132–146)

## 2023-09-11 PROCEDURE — 80048 BASIC METABOLIC PNL TOTAL CA: CPT

## 2023-09-11 PROCEDURE — 99214 OFFICE O/P EST MOD 30 MIN: CPT

## 2023-09-11 PROCEDURE — 99214 OFFICE O/P EST MOD 30 MIN: CPT | Performed by: NURSE PRACTITIONER

## 2023-09-11 PROCEDURE — 36415 COLL VENOUS BLD VENIPUNCTURE: CPT

## 2023-09-11 PROCEDURE — 83880 ASSAY OF NATRIURETIC PEPTIDE: CPT

## 2023-09-11 RX ORDER — BUMETANIDE 1 MG/1
1 TABLET ORAL DAILY
Qty: 90 TABLET | Refills: 3 | Status: SHIPPED | OUTPATIENT
Start: 2023-09-11

## 2023-09-11 ASSESSMENT — SLEEP AND FATIGUE QUESTIONNAIRES
HOW LIKELY ARE YOU TO NOD OFF OR FALL ASLEEP WHILE SITTING AND READING: 0
HOW LIKELY ARE YOU TO NOD OFF OR FALL ASLEEP WHILE SITTING INACTIVE IN A PUBLIC PLACE: 0
NECK CIRCUMFERENCE (INCHES): 15
HOW LIKELY ARE YOU TO NOD OFF OR FALL ASLEEP WHILE SITTING QUIETLY AFTER LUNCH WITHOUT ALCOHOL: 0
HOW LIKELY ARE YOU TO NOD OFF OR FALL ASLEEP WHEN YOU ARE A PASSENGER IN A CAR FOR AN HOUR WITHOUT A BREAK: 0
HOW LIKELY ARE YOU TO NOD OFF OR FALL ASLEEP IN A CAR, WHILE STOPPED FOR A FEW MINUTES IN TRAFFIC: 0
HOW LIKELY ARE YOU TO NOD OFF OR FALL ASLEEP WHILE WATCHING TV: 1
ESS TOTAL SCORE: 3
HOW LIKELY ARE YOU TO NOD OFF OR FALL ASLEEP WHILE LYING DOWN TO REST IN THE AFTERNOON WHEN CIRCUMSTANCES PERMIT: 2
HOW LIKELY ARE YOU TO NOD OFF OR FALL ASLEEP WHILE SITTING AND TALKING TO SOMEONE: 0

## 2023-09-11 NOTE — PROGRESS NOTES
osteoporosis without current pathological fracture 02/22/2023     Priority: Medium    Acute cystitis without hematuria 01/20/2023     Priority: Medium    Mixed hyperlipidemia 12/15/2022     Priority: Medium    Acquired hypothyroidism 12/15/2022     Priority: Medium    Bipolar 1 disorder ([address]) 12/15/2022     Priority: Medium    Severe obesity (BMI 35.0-39. 9) with comorbidity ([address]) 09/15/2022     Priority: Medium    Impaired fasting glucose 09/15/2022     Priority: Medium    Stage 3b chronic kidney disease ([address]) 09/15/2022     Priority: Medium    Chronic deep vein thrombosis (DVT) of calf muscle vein of left lower extremity ([address]) 09/15/2022     Priority: Medium    History of herpes encephalitis 09/15/2022     Priority: Medium    Chronic systolic (congestive) heart failure 08/29/2023    Hypotension     LBBB (left bundle branch block)     History of DVT (deep vein thrombosis)     Chronic anticoagulation     Acute metabolic encephalopathy     Hypoxia 02/25/2022    Thyroid disease     Diastolic CHF, acute on chronic ([address])     Leg swelling 09/03/2020    Lymphedema of both lower extremities 09/03/2020    Acute gout involving toe of right foot 09/03/2020    Hypertension 08/06/2018    Kidney transplanted 12/02/2016    Cancer ([address])      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      Peripheral vascular disease ([address])     Depression     Clotting disorder ([address])      thrombophlebitis after c section delivery      Abnormal EKG            Past Medical History:   Diagnosis Date    Abnormal EKG     left bundle branch block    Acute cystitis without hematuria 01/20/2023    Acute cystitis without hematuria 01/20/2023    Acute exacerbation of chronic obstructive pulmonary disease (COPD) ([address]) 05/19/2022    Acute gout involving toe of right foot 09/03/2020    Acute on chronic combined systolic (congestive) and diastolic (congestive) heart failure (HCC)     asthmatic bronchitis 03/07/2019    Bipolar disorder ([address]) 11/26/2018

## 2023-09-11 NOTE — DISCHARGE INSTRUCTIONS
Get blood work in [address] [place] [address] today   Continue current cardiac medications   Referral for sleep study  Consider stress test if recurrent HF exacerbation   Follow up with CHF clinic and Dr. Smith Care as scheduled   Weigh yourself daily    -Stay Hydrated, 64 oz     -Diet should sodium restricted to 2 grams    -Again watch your daily weight trends and if you gain water weight please follow below instructions.    -If you gain 3-5 pounds in 2-3 days OR notice that you are retaining fluid in anyway just like you did before then take an extra dose of your water pill (Bumex) every day until you lose the weight or feel better.     -If you notice that you have taken more than 2 extra doses in 1 week then please call and let us know. -If at any time you feel that you are retaining fluid, your medications are not working, or you feel ill in anyway, then please call us for either same day appointment or the next day, and for instructions. Our goal is to keep you out of the emergency room and the hospital and we have ways to do it. You just need to call us in a timely manner.     -If you become sick for other reasons, and notice that you are not urinating as much, the urine is very dark, you have significant diarrhea or vomiting, then please DO NOT take your water pill and CALL US immediately.

## 2023-09-22 ENCOUNTER — OFFICE VISIT (OUTPATIENT)
Dept: CARDIOLOGY CLINIC | Age: 68
End: 2023-09-22
Payer: MEDICARE

## 2023-09-22 VITALS
BODY MASS INDEX: 38.09 KG/M2 | RESPIRATION RATE: 18 BRPM | HEART RATE: 88 BPM | HEIGHT: 62 IN | DIASTOLIC BLOOD PRESSURE: 76 MMHG | SYSTOLIC BLOOD PRESSURE: 118 MMHG | WEIGHT: 207 LBS

## 2023-09-22 DIAGNOSIS — E03.9 ACQUIRED HYPOTHYROIDISM: ICD-10-CM

## 2023-09-22 DIAGNOSIS — I44.7 LBBB (LEFT BUNDLE BRANCH BLOCK): Primary | ICD-10-CM

## 2023-09-22 DIAGNOSIS — I73.9 PAD (PERIPHERAL ARTERY DISEASE) (HCC): ICD-10-CM

## 2023-09-22 DIAGNOSIS — Z86.79 HISTORY OF CARDIOMYOPATHY: ICD-10-CM

## 2023-09-22 PROCEDURE — 93000 ELECTROCARDIOGRAM COMPLETE: CPT | Performed by: INTERNAL MEDICINE

## 2023-09-22 PROCEDURE — 1036F TOBACCO NON-USER: CPT | Performed by: INTERNAL MEDICINE

## 2023-09-22 PROCEDURE — G8427 DOCREV CUR MEDS BY ELIG CLIN: HCPCS | Performed by: INTERNAL MEDICINE

## 2023-09-22 PROCEDURE — 1123F ACP DISCUSS/DSCN MKR DOCD: CPT | Performed by: INTERNAL MEDICINE

## 2023-09-22 PROCEDURE — 99213 OFFICE O/P EST LOW 20 MIN: CPT | Performed by: INTERNAL MEDICINE

## 2023-09-22 PROCEDURE — 3017F COLORECTAL CA SCREEN DOC REV: CPT | Performed by: INTERNAL MEDICINE

## 2023-09-22 PROCEDURE — 3078F DIAST BP <80 MM HG: CPT | Performed by: INTERNAL MEDICINE

## 2023-09-22 PROCEDURE — G8417 CALC BMI ABV UP PARAM F/U: HCPCS | Performed by: INTERNAL MEDICINE

## 2023-09-22 PROCEDURE — 3074F SYST BP LT 130 MM HG: CPT | Performed by: INTERNAL MEDICINE

## 2023-09-22 PROCEDURE — 1090F PRES/ABSN URINE INCON ASSESS: CPT | Performed by: INTERNAL MEDICINE

## 2023-09-22 PROCEDURE — G8399 PT W/DXA RESULTS DOCUMENT: HCPCS | Performed by: INTERNAL MEDICINE

## 2023-09-22 NOTE — PROGRESS NOTES
OFFICE VISIT     PRIMARY CARE PHYSICIAN:      Mony Malave DO       ALLERGIES / SENSITIVITIES:        Allergies   Allergen Reactions    Cipro [Ciprofloxacin Hcl]     Macrodantin [Nitrofurantoin Macrocrystal] Nausea Only    Sulfa Antibiotics Nausea Only          REVIEWED MEDICATIONS:        Current Outpatient Medications:     apixaban (ELIQUIS) 5 MG TABS tablet, Take 1 tablet by mouth 2 times daily, Disp: 180 tablet, Rfl: 3    bumetanide (BUMEX) 1 MG tablet, Take 1 tablet by mouth daily, Disp: 90 tablet, Rfl: 3    predniSONE (DELTASONE) 5 MG tablet, Take 1 tablet by mouth daily, Disp: , Rfl:     docusate sodium (COLACE) 100 MG capsule, Take 1 capsule by mouth daily as needed for Constipation, Disp: , Rfl:     simvastatin (ZOCOR) 40 MG tablet, Take 1 tablet by mouth nightly, Disp: 90 tablet, Rfl: 2    acyclovir (ZOVIRAX) 400 MG tablet, Take 1 tablet by mouth 2 times daily, Disp: 60 tablet, Rfl: 3    metoprolol succinate (TOPROL XL) 25 MG extended release tablet, Take 0.5 tablets by mouth in the morning and 0.5 tablets in the evening., Disp: 30 tablet, Rfl: 3    desvenlafaxine succinate (PRISTIQ) 100 MG TB24 extended release tablet, Take 1 tablet by mouth every evening, Disp: 30 tablet, Rfl: 0    allopurinol (ZYLOPRIM) 100 MG tablet, take 1 tablet by mouth once daily, Disp: 90 tablet, Rfl: 1    levothyroxine (SYNTHROID) 50 MCG tablet, Take 1 tablet by mouth daily, Disp: 90 tablet, Rfl: 1    calcitRIOL (ROCALTROL) 0.25 MCG capsule, Take 1 capsule by mouth three times a week M w fri, Disp: , Rfl:     cloZAPine (CLOZARIL) 50 MG tablet, Take 1 tablet by mouth nightly, Disp: , Rfl:     vitamin D (CHOLECALCIFEROL) 25 MCG (1000 UT) TABS tablet, Take 1 tablet by mouth daily, Disp: , Rfl:     cycloSPORINE (SANDIMMUNE) 100 MG capsule, Take 1 capsule by mouth 2 times daily, Disp: , Rfl:     folic acid (FOLVITE) 1 MG tablet, Take 1 tablet by mouth daily, Disp: , Rfl:     mycophenolate (CELLCEPT) 500 MG tablet, Take 2

## 2023-10-02 ENCOUNTER — HOSPITAL ENCOUNTER (OUTPATIENT)
Age: 68
Discharge: HOME | End: 2023-10-02
Payer: MEDICARE

## 2023-10-02 ENCOUNTER — TELEPHONE (OUTPATIENT)
Dept: SLEEP CENTER | Age: 68
End: 2023-10-02

## 2023-10-02 LAB
25(OH)D3 SERPL-MCNC: 43.8 NG/ML (ref 30–100)
ALBUMIN SERPL-MCNC: 4.4 G/DL (ref 3.5–5.2)
ANION GAP SERPL CALCULATED.3IONS-SCNC: 13 MMOL/L (ref 7–16)
BASOPHILS # BLD: 0.03 K/UL (ref 0–0.2)
BASOPHILS NFR BLD: 0 % (ref 0–2)
BUN SERPL-MCNC: 26 MG/DL (ref 6–23)
CALCIUM SERPL-MCNC: 10.3 MG/DL (ref 8.6–10.2)
CHLORIDE SERPL-SCNC: 100 MMOL/L (ref 98–107)
CO2 SERPL-SCNC: 25 MMOL/L (ref 22–29)
CREAT SERPL-MCNC: 1.5 MG/DL (ref 0.5–1)
CREAT UR-MCNC: 19.2 MG/DL (ref 29–226)
EOSINOPHIL # BLD: 0.11 K/UL (ref 0.05–0.5)
EOSINOPHILS RELATIVE PERCENT: 1 % (ref 0–6)
ERYTHROCYTE [DISTWIDTH] IN BLOOD BY AUTOMATED COUNT: 14.3 % (ref 11.5–15)
GFR SERPL CREATININE-BSD FRML MDRD: 37 ML/MIN/1.73M2
GLUCOSE SERPL-MCNC: 119 MG/DL (ref 74–99)
HCT VFR BLD AUTO: 39.9 % (ref 34–48)
HGB BLD-MCNC: 12.6 G/DL (ref 11.5–15.5)
IMM GRANULOCYTES # BLD AUTO: 0.08 K/UL (ref 0–0.58)
IMM GRANULOCYTES NFR BLD: 1 % (ref 0–5)
LYMPHOCYTES NFR BLD: 0.99 K/UL (ref 1.5–4)
LYMPHOCYTES RELATIVE PERCENT: 8 % (ref 20–42)
MAGNESIUM SERPL-MCNC: 1.9 MG/DL (ref 1.6–2.6)
MCH RBC QN AUTO: 34.7 PG (ref 26–35)
MCHC RBC AUTO-ENTMCNC: 31.6 G/DL (ref 32–34.5)
MCV RBC AUTO: 109.9 FL (ref 80–99.9)
MONOCYTES NFR BLD: 0.72 K/UL (ref 0.1–0.95)
MONOCYTES NFR BLD: 6 % (ref 2–12)
NEUTROPHILS NFR BLD: 84 % (ref 43–80)
NEUTS SEG NFR BLD: 9.87 K/UL (ref 1.8–7.3)
PHOSPHATE SERPL-MCNC: 3.3 MG/DL (ref 2.5–4.5)
PLATELET # BLD AUTO: 122 K/UL (ref 130–450)
PMV BLD AUTO: 13 FL (ref 7–12)
POTASSIUM SERPL-SCNC: 4.4 MMOL/L (ref 3.5–5)
PTH-INTACT SERPL-MCNC: 139.2 PG/ML (ref 15–65)
RBC # BLD AUTO: 3.63 M/UL (ref 3.5–5.5)
SODIUM SERPL-SCNC: 138 MMOL/L (ref 132–146)
TOTAL PROTEIN, URINE: 6 MG/DL (ref 0–12)
WBC OTHER # BLD: 11.8 K/UL (ref 4.5–11.5)

## 2023-10-02 PROCEDURE — 83970 ASSAY OF PARATHORMONE: CPT

## 2023-10-02 PROCEDURE — 84156 ASSAY OF PROTEIN URINE: CPT

## 2023-10-02 PROCEDURE — 82306 VITAMIN D 25 HYDROXY: CPT

## 2023-10-02 PROCEDURE — 36415 COLL VENOUS BLD VENIPUNCTURE: CPT

## 2023-10-02 PROCEDURE — 80069 RENAL FUNCTION PANEL: CPT

## 2023-10-02 PROCEDURE — 82570 ASSAY OF URINE CREATININE: CPT

## 2023-10-02 PROCEDURE — 85025 COMPLETE CBC W/AUTO DIFF WBC: CPT

## 2023-10-02 PROCEDURE — 80158 DRUG ASSAY CYCLOSPORINE: CPT

## 2023-10-02 PROCEDURE — 83735 ASSAY OF MAGNESIUM: CPT

## 2023-10-05 ENCOUNTER — TELEPHONE (OUTPATIENT)
Dept: OTHER | Age: 68
End: 2023-10-05

## 2023-10-05 NOTE — TELEPHONE ENCOUNTER
1:59 PM Call received from [place] at [facility] SITE re: patient with increase edema (2+) pedal. Spoke with patient who admits she did consume [place] food yesterday. Scheduled for tomorrow to come and and evaluate.      Electronically signed by Gale Pemberton RN on 10/5/2023 at 2:00 PM The patient has been examined and the H&P has been reviewed:    Pt was unable to tolerate TRUS under local at [facility], returns today for prostate biopsy under general anesthesia.  All risks benefits reviewed.  He has held bloodthinners, taken abx, and enema    Anesthesia/Surgery risks, benefits and alternative options discussed and understood by patient/family.          Active Hospital Problems    Diagnosis  POA    Elevated PSA [R97.20]  Yes      Resolved Hospital Problems   No resolved problems to display.

## 2023-10-06 ENCOUNTER — HOSPITAL ENCOUNTER (OUTPATIENT)
Dept: OTHER | Age: 68
Setting detail: THERAPIES SERIES
Discharge: HOME | End: 2023-10-06
Payer: MEDICARE

## 2023-10-06 VITALS
DIASTOLIC BLOOD PRESSURE: 80 MMHG | WEIGHT: 207.8 LBS | HEIGHT: 62 IN | OXYGEN SATURATION: 93 % | SYSTOLIC BLOOD PRESSURE: 159 MMHG | RESPIRATION RATE: 18 BRPM | HEART RATE: 79 BPM | BODY MASS INDEX: 38.24 KG/M2

## 2023-10-06 LAB
ANION GAP SERPL CALCULATED.3IONS-SCNC: 12 MMOL/L (ref 7–16)
BNP SERPL-MCNC: 1788 PG/ML (ref 0–450)
BUN SERPL-MCNC: 35 MG/DL (ref 6–23)
CALCIUM SERPL-MCNC: 10.3 MG/DL (ref 8.6–10.2)
CHLORIDE SERPL-SCNC: 96 MMOL/L (ref 98–107)
CO2 SERPL-SCNC: 25 MMOL/L (ref 22–29)
CREAT SERPL-MCNC: 1.6 MG/DL (ref 0.5–1)
CYCLOSPORINE BLD-MCNC: 121 UG/L (ref 50–300)
GFR SERPL CREATININE-BSD FRML MDRD: 35 ML/MIN/1.73M2
GLUCOSE SERPL-MCNC: 125 MG/DL (ref 74–99)
POTASSIUM SERPL-SCNC: 4.1 MMOL/L (ref 3.5–5)
SODIUM SERPL-SCNC: 133 MMOL/L (ref 132–146)

## 2023-10-06 PROCEDURE — 83880 ASSAY OF NATRIURETIC PEPTIDE: CPT

## 2023-10-06 PROCEDURE — 2580000003 HC RX 258: Performed by: INTERNAL MEDICINE

## 2023-10-06 PROCEDURE — 96374 THER/PROPH/DIAG INJ IV PUSH: CPT

## 2023-10-06 PROCEDURE — 36415 COLL VENOUS BLD VENIPUNCTURE: CPT

## 2023-10-06 PROCEDURE — 80048 BASIC METABOLIC PNL TOTAL CA: CPT

## 2023-10-06 PROCEDURE — 2500000003 HC RX 250 WO HCPCS: Performed by: INTERNAL MEDICINE

## 2023-10-06 PROCEDURE — 99214 OFFICE O/P EST MOD 30 MIN: CPT

## 2023-10-06 RX ORDER — SODIUM CHLORIDE 0.9 % (FLUSH) 0.9 %
5-40 SYRINGE (ML) INJECTION 2 TIMES DAILY
Status: DISCONTINUED | OUTPATIENT
Start: 2023-10-06 | End: 2023-10-07 | Stop reason: HOSPADM

## 2023-10-06 RX ORDER — BUMETANIDE 0.25 MG/ML
2 INJECTION INTRAMUSCULAR; INTRAVENOUS ONCE
Status: COMPLETED | OUTPATIENT
Start: 2023-10-06 | End: 2023-10-06

## 2023-10-06 RX ADMIN — BUMETANIDE 2 MG: 0.25 INJECTION INTRAMUSCULAR; INTRAVENOUS at 10:32

## 2023-10-06 RX ADMIN — SODIUM CHLORIDE, PRESERVATIVE FREE 10 ML: 5 INJECTION INTRAVENOUS at 10:35

## 2023-10-06 NOTE — PROGRESS NOTES
Congestive Heart Failure [address] [facility]     Referring Provider: Dr. Martina Gandhi  Primary Care Physician: Jose Foley DO   Cardiologist: Dr. Temo Riley  Nephrologist: Dr. Kristie Martino:     Praveena Garcia is a 80 y.o. (1955) female with a history of HFpEF, most recent EF:  Lab Results   Component Value Date    LVEF 55 07/24/2023    [id number] [facility] Nuclear Medicine Scan 06/16/2015         She presents to the CHF clinic for ongoing evaluation and monitoring of heart failure.     In the CHF clinic today she denies any adverse symptoms except:  [] Dizziness or lightheadedness   [] Syncope or near syncope  [] Recent Fall  [] Shortness of breath at rest   [] Dyspnea with exertion  [] Decline in functional capacity (unable to perform activities they had previously been able to do)  [x] Fatigue   [] Orthopnea  [] PND  [] Nocturnal cough  [] Hemoptysis  [] Chest pain, pressure, or discomfort  [] Palpitations  [] Abdominal distention  [] Abdominal bloating  [] Early satiety  [] Blood in stool   [] Diarrhea  [] Constipation  [] Nausea/Vomiting  [] Decreased urinary response to oral diuretic   [] Scrotal swelling   [x] Lower extremity edema  [] Used PRN doses of oral diuretic   [x] Weight gain    Wt Readings from Last 3 Encounters:   10/06/23 207 lb 12.8 oz (94.3 kg)   09/22/23 207 lb (93.9 kg)   09/11/23 207 lb (93.9 kg)           SOCIAL HISTORY:  [x] Denies tobacco, alcohol or illicit drug abuse  [] Tobacco use:  [] ETOH use:  [] Illicit drug use:        MEDICATIONS:    Allergies   Allergen Reactions    Cipro [Ciprofloxacin Hcl]     Macrodantin [Nitrofurantoin Macrocrystal] Nausea Only    Sulfa Antibiotics Nausea Only       Current Outpatient Medications:     apixaban (ELIQUIS) 5 MG TABS tablet, Take 1 tablet by mouth 2 times daily, Disp: 180 tablet, Rfl: 3    bumetanide (BUMEX) 1 MG tablet, Take 1 tablet by mouth daily, Disp: 90 tablet, Rfl: 3

## 2023-10-09 ENCOUNTER — HOSPITAL ENCOUNTER (OUTPATIENT)
Dept: MAMMOGRAPHY | Age: 68
Discharge: HOME | End: 2023-10-11
Attending: NEUROMUSCULOSKELETAL MEDICINE & OMM
Payer: MEDICARE

## 2023-10-09 VITALS — BODY MASS INDEX: 36.8 KG/M2 | HEIGHT: 62 IN | WEIGHT: 200 LBS

## 2023-10-09 DIAGNOSIS — Z12.31 ENCOUNTER FOR SCREENING MAMMOGRAM FOR MALIGNANT NEOPLASM OF BREAST: ICD-10-CM

## 2023-10-09 PROCEDURE — 77063 BREAST TOMOSYNTHESIS BI: CPT

## 2023-10-10 DIAGNOSIS — R92.8 OTHER ABNORMAL AND INCONCLUSIVE FINDINGS ON DIAGNOSTIC IMAGING OF BREAST: ICD-10-CM

## 2023-10-10 DIAGNOSIS — R93.89 NODULAR RADIOLOGIC DENSITY: Primary | ICD-10-CM

## 2023-10-10 DIAGNOSIS — R92.8 ABNORMAL MAMMOGRAM: Primary | ICD-10-CM

## 2023-10-11 ENCOUNTER — TELEPHONE (OUTPATIENT)
Dept: MAMMOGRAPHY | Age: 68
End: 2023-10-11

## 2023-10-11 NOTE — TELEPHONE ENCOUNTER
Call to patient in reference to her mammogram performed at [facility] on 10/9/23. Instructed patient that the radiologist has recommended additional breast imaging in order to make a final determination and further recommendations. Patient verbalized understanding and is agreeable to proceed at [facility]. Orders received per Dr. Alfa Lambert in [facility].  Patient scheduled for 10/13/23 at 9:30 am. TATI Bowles, RN -Breast Navigator

## 2023-10-13 ENCOUNTER — TELEPHONE (OUTPATIENT)
Dept: CARDIOLOGY CLINIC | Age: 68
End: 2023-10-13

## 2023-10-13 ENCOUNTER — HOSPITAL ENCOUNTER (OUTPATIENT)
Dept: ULTRASOUND IMAGING | Age: 68
Discharge: HOME | End: 2023-10-13
Payer: MEDICARE

## 2023-10-13 ENCOUNTER — HOSPITAL ENCOUNTER (OUTPATIENT)
Dept: MAMMOGRAPHY | Age: 68
End: 2023-10-13
Payer: MEDICARE

## 2023-10-13 DIAGNOSIS — R92.8 OTHER ABNORMAL AND INCONCLUSIVE FINDINGS ON DIAGNOSTIC IMAGING OF BREAST: ICD-10-CM

## 2023-10-13 DIAGNOSIS — R92.8 ABNORMAL MAMMOGRAM: ICD-10-CM

## 2023-10-13 DIAGNOSIS — R93.89 NODULAR RADIOLOGIC DENSITY: ICD-10-CM

## 2023-10-13 PROCEDURE — G0279 TOMOSYNTHESIS, MAMMO: HCPCS

## 2023-10-13 PROCEDURE — 76642 ULTRASOUND BREAST LIMITED: CPT

## 2023-10-13 NOTE — TELEPHONE ENCOUNTER
Spoke to Patient she is coherent and seems to answer questions without hesitation  states she has shown some improvement in SOB   Denies increased   Shortness of breath with activity or when lying down. Fatigue and weakness. Swelling in the legs, ankles and feet. Rapid or irregular heartbeat.     Current weight 204.2  No B/P to report

## 2023-10-13 NOTE — TELEPHONE ENCOUNTER
----- Message from KATINA Ahuja CNP sent at 10/10/2023  5:57 PM EDT -----  Please follow up with patient  How did she respond to IV diuretic from CHF clinic  How are her symptoms, weight, etc (can contact home health care if patient unable to elaborate)  Consider increase bumex to 2 mg daily    Thank you

## 2023-10-24 RX ORDER — FOLIC ACID 1 MG/1
1 TABLET ORAL DAILY
Qty: 90 TABLET | Refills: 1 | Status: SHIPPED | OUTPATIENT
Start: 2023-10-24

## 2023-10-24 NOTE — TELEPHONE ENCOUNTER
Patient is requesting refill for    Folic Acid 1mg - QD  90 day supply    [facility] on [address]

## 2023-10-25 ENCOUNTER — TELEPHONE (OUTPATIENT)
Dept: OTHER | Age: 68
End: 2023-10-25

## 2023-10-25 ENCOUNTER — APPOINTMENT (OUTPATIENT)
Dept: OTHER | Age: 68
End: 2023-10-25
Payer: MEDICARE

## 2023-10-25 VITALS
DIASTOLIC BLOOD PRESSURE: 66 MMHG | HEART RATE: 77 BPM | SYSTOLIC BLOOD PRESSURE: 124 MMHG | OXYGEN SATURATION: 94 % | BODY MASS INDEX: 38.01 KG/M2 | WEIGHT: 207.8 LBS

## 2023-10-25 DIAGNOSIS — Z13.9 ENCOUNTER FOR SCREENING INVOLVING SOCIAL DETERMINANTS OF HEALTH (SDOH): Primary | ICD-10-CM

## 2023-10-25 LAB
ANION GAP SERPL CALCULATED.3IONS-SCNC: 13 MMOL/L (ref 7–16)
BNP SERPL-MCNC: 1623 PG/ML (ref 0–450)
BUN SERPL-MCNC: 34 MG/DL (ref 6–23)
CALCIUM SERPL-MCNC: 10 MG/DL (ref 8.6–10.2)
CHLORIDE SERPL-SCNC: 100 MMOL/L (ref 98–107)
CO2 SERPL-SCNC: 26 MMOL/L (ref 22–29)
CREAT SERPL-MCNC: 1.5 MG/DL (ref 0.5–1)
GFR SERPL CREATININE-BSD FRML MDRD: 37 ML/MIN/1.73M2
GLUCOSE SERPL-MCNC: 123 MG/DL (ref 74–99)
POTASSIUM SERPL-SCNC: 4.2 MMOL/L (ref 3.5–5)
SODIUM SERPL-SCNC: 139 MMOL/L (ref 132–146)

## 2023-10-25 PROCEDURE — 99214 OFFICE O/P EST MOD 30 MIN: CPT

## 2023-10-25 PROCEDURE — 83880 ASSAY OF NATRIURETIC PEPTIDE: CPT

## 2023-10-25 PROCEDURE — 36415 COLL VENOUS BLD VENIPUNCTURE: CPT

## 2023-10-25 PROCEDURE — 80048 BASIC METABOLIC PNL TOTAL CA: CPT

## 2023-10-25 SDOH — ECONOMIC STABILITY: FOOD INSECURITY: WITHIN THE PAST 12 MONTHS, THE FOOD YOU BOUGHT JUST DIDN'T LAST AND YOU DIDN'T HAVE MONEY TO GET MORE.: NEVER TRUE

## 2023-10-25 SDOH — HEALTH STABILITY: PHYSICAL HEALTH: ON AVERAGE, HOW MANY DAYS PER WEEK DO YOU ENGAGE IN MODERATE TO STRENUOUS EXERCISE (LIKE A BRISK WALK)?: 0 DAYS

## 2023-10-25 SDOH — SOCIAL STABILITY: SOCIAL NETWORK: ARE YOU MARRIED, WIDOWED, DIVORCED, SEPARATED, NEVER MARRIED, OR LIVING WITH A PARTNER?: DIVORCED

## 2023-10-25 SDOH — SOCIAL STABILITY: SOCIAL NETWORK: IN A TYPICAL WEEK, HOW MANY TIMES DO YOU TALK ON THE PHONE WITH FAMILY, FRIENDS, OR NEIGHBORS?: THREE TIMES A WEEK

## 2023-10-25 SDOH — HEALTH STABILITY: MENTAL HEALTH: HOW MANY STANDARD DRINKS CONTAINING ALCOHOL DO YOU HAVE ON A TYPICAL DAY?: PATIENT DOES NOT DRINK

## 2023-10-25 SDOH — HEALTH STABILITY: PHYSICAL HEALTH: ON AVERAGE, HOW MANY MINUTES DO YOU ENGAGE IN EXERCISE AT THIS LEVEL?: 0 MIN

## 2023-10-25 SDOH — ECONOMIC STABILITY: INCOME INSECURITY: IN THE LAST 12 MONTHS, WAS THERE A TIME WHEN YOU WERE NOT ABLE TO PAY THE MORTGAGE OR RENT ON TIME?: NO

## 2023-10-25 SDOH — SOCIAL STABILITY: SOCIAL NETWORK: HOW OFTEN DO YOU ATTEND CHURCH OR RELIGIOUS SERVICES?: 1 TO 4 TIMES PER YEAR

## 2023-10-25 SDOH — ECONOMIC STABILITY: TRANSPORTATION INSECURITY
IN THE PAST 12 MONTHS, HAS LACK OF TRANSPORTATION KEPT YOU FROM MEETINGS, WORK, OR FROM GETTING THINGS NEEDED FOR DAILY LIVING?: NO

## 2023-10-25 SDOH — HEALTH STABILITY: MENTAL HEALTH: HOW OFTEN DO YOU HAVE A DRINK CONTAINING ALCOHOL?: NEVER

## 2023-10-25 SDOH — ECONOMIC STABILITY: FOOD INSECURITY: WITHIN THE PAST 12 MONTHS, YOU WORRIED THAT YOUR FOOD WOULD RUN OUT BEFORE YOU GOT MONEY TO BUY MORE.: NEVER TRUE

## 2023-10-25 SDOH — ECONOMIC STABILITY: HOUSING INSECURITY: IN THE LAST 12 MONTHS, HOW MANY PLACES HAVE YOU LIVED?: 1

## 2023-10-25 SDOH — ECONOMIC STABILITY: INCOME INSECURITY: HOW HARD IS IT FOR YOU TO PAY FOR THE VERY BASICS LIKE FOOD, HOUSING, MEDICAL CARE, AND HEATING?: NOT HARD AT ALL

## 2023-10-25 SDOH — SOCIAL STABILITY: SOCIAL NETWORK
DO YOU BELONG TO ANY CLUBS OR ORGANIZATIONS SUCH AS CHURCH GROUPS UNIONS, FRATERNAL OR ATHLETIC GROUPS, OR SCHOOL GROUPS?: NO

## 2023-10-25 SDOH — ECONOMIC STABILITY: TRANSPORTATION INSECURITY
IN THE PAST 12 MONTHS, HAS THE LACK OF TRANSPORTATION KEPT YOU FROM MEDICAL APPOINTMENTS OR FROM GETTING MEDICATIONS?: NO

## 2023-10-25 SDOH — SOCIAL STABILITY: SOCIAL NETWORK: HOW OFTEN DO YOU GET TOGETHER WITH FRIENDS OR RELATIVES?: TWICE A WEEK

## 2023-10-25 SDOH — HEALTH STABILITY: MENTAL HEALTH
STRESS IS WHEN SOMEONE FEELS TENSE, NERVOUS, ANXIOUS, OR CAN'T SLEEP AT NIGHT BECAUSE THEIR MIND IS TROUBLED. HOW STRESSED ARE YOU?: ONLY A LITTLE

## 2023-10-25 SDOH — SOCIAL STABILITY: SOCIAL NETWORK: HOW OFTEN DO YOU ATTENT MEETINGS OF THE CLUB OR ORGANIZATION YOU BELONG TO?: NEVER

## 2023-10-25 ASSESSMENT — PATIENT HEALTH QUESTIONNAIRE - PHQ9
2. FEELING DOWN, DEPRESSED OR HOPELESS: 0
SUM OF ALL RESPONSES TO PHQ QUESTIONS 1-9: 0
SUM OF ALL RESPONSES TO PHQ QUESTIONS 1-9: 0
1. LITTLE INTEREST OR PLEASURE IN DOING THINGS: 0
SUM OF ALL RESPONSES TO PHQ QUESTIONS 1-9: 0
SUM OF ALL RESPONSES TO PHQ QUESTIONS 1-9: 0
SUM OF ALL RESPONSES TO PHQ9 QUESTIONS 1 & 2: 0

## 2023-10-25 NOTE — TELEPHONE ENCOUNTER
Housing Stability: Low Risk  (10/25/2023)    Housing Stability Vital Sign     Unable to Pay for Housing in the Last Year: No     Number of Places Lived in the Last Year: 1     Unstable Housing in the Last Year: No      Notes: Met with pt and did the SDOH screening. She indicated that she does not have any needs at this time. I provided my contact information and told her to call me with any needs she might have. Patient in agreement with plan/refused further assistance.   [x] Agreed    [] Refused      Manual Formica, RN  Congestive Heart Failure [facility]   Office number: [phone]

## 2023-10-25 NOTE — PROGRESS NOTES
and PHQ result faxed   [] Behavorial health consultant called      Scheduled to follow up in CHF clinic on:    Future Appointments   Date Time Provider [address]   11/8/2023  8:45 AM [facility] CHF ROOM 2 [facility] CHF [facility]   11/30/2023  8:45 AM Lisa Malave DO [facility]   1/19/2024  9:30 PM [facility] SLEEP LAB ROOM 1 [facility] SLEEP [facility]   3/26/2024  8:30 AM Markel Bianchi MD [facility]   3/27/2024  9:15 AM Nam Espinal MD [facility]

## 2023-11-02 ENCOUNTER — HOSPITAL ENCOUNTER (OUTPATIENT)
Age: 68
Discharge: HOME | End: 2023-11-02
Payer: MEDICARE

## 2023-11-02 LAB
BASOPHILS # BLD: 0 K/UL (ref 0–0.2)
BASOPHILS NFR BLD: 0 % (ref 0–2)
EOSINOPHIL # BLD: 0 K/UL (ref 0.05–0.5)
EOSINOPHILS RELATIVE PERCENT: 0 % (ref 0–6)
ERYTHROCYTE [DISTWIDTH] IN BLOOD BY AUTOMATED COUNT: 14.1 % (ref 11.5–15)
HCT VFR BLD AUTO: 41.9 % (ref 34–48)
HGB BLD-MCNC: 13.3 G/DL (ref 11.5–15.5)
LYMPHOCYTES NFR BLD: 0.75 K/UL (ref 1.5–4)
LYMPHOCYTES RELATIVE PERCENT: 6 % (ref 20–42)
MCH RBC QN AUTO: 35.4 PG (ref 26–35)
MCHC RBC AUTO-ENTMCNC: 31.7 G/DL (ref 32–34.5)
MCV RBC AUTO: 111.4 FL (ref 80–99.9)
MONOCYTES NFR BLD: 0.43 K/UL (ref 0.1–0.95)
MONOCYTES NFR BLD: 4 % (ref 2–12)
NEUTROPHILS NFR BLD: 90 % (ref 43–80)
NEUTS SEG NFR BLD: 11.21 K/UL (ref 1.8–7.3)
PLATELET # BLD AUTO: 153 K/UL (ref 130–450)
PMV BLD AUTO: 13 FL (ref 7–12)
RBC # BLD AUTO: 3.76 M/UL (ref 3.5–5.5)
RBC # BLD: ABNORMAL 10*6/UL
RBC # BLD: ABNORMAL 10*6/UL
WBC OTHER # BLD: 12.4 K/UL (ref 4.5–11.5)

## 2023-11-02 PROCEDURE — 85025 COMPLETE CBC W/AUTO DIFF WBC: CPT

## 2023-11-02 PROCEDURE — 36415 COLL VENOUS BLD VENIPUNCTURE: CPT

## 2023-11-07 RX ORDER — PREDNISONE 5 MG/1
5 TABLET ORAL DAILY
Qty: 90 TABLET | Refills: 1 | Status: SHIPPED | OUTPATIENT
Start: 2023-11-07

## 2023-11-07 NOTE — TELEPHONE ENCOUNTER
Patient is requesting refill for    Prednisone 5mg - QD  90 day supply    [facility] on [address].

## 2023-11-08 ENCOUNTER — HOSPITAL ENCOUNTER (OUTPATIENT)
Dept: OTHER | Age: 68
Setting detail: THERAPIES SERIES
Discharge: HOME | End: 2023-11-08
Payer: MEDICARE

## 2023-11-08 ENCOUNTER — HOSPITAL ENCOUNTER (OUTPATIENT)
Age: 68
Discharge: HOME | End: 2023-11-08
Payer: MEDICARE

## 2023-11-08 VITALS
BODY MASS INDEX: 38.04 KG/M2 | OXYGEN SATURATION: 96 % | HEART RATE: 92 BPM | RESPIRATION RATE: 18 BRPM | DIASTOLIC BLOOD PRESSURE: 62 MMHG | SYSTOLIC BLOOD PRESSURE: 134 MMHG | WEIGHT: 208 LBS

## 2023-11-08 LAB
25(OH)D3 SERPL-MCNC: 41.5 NG/ML (ref 30–100)
ALBUMIN SERPL-MCNC: 4.3 G/DL (ref 3.5–5.2)
ALP SERPL-CCNC: 80 U/L (ref 35–104)
ALT SERPL-CCNC: 8 U/L (ref 0–32)
ANION GAP SERPL CALCULATED.3IONS-SCNC: 11 MMOL/L (ref 7–16)
AST SERPL-CCNC: 11 U/L (ref 0–31)
BACTERIA URNS QL MICRO: ABNORMAL
BASOPHILS # BLD: 0 K/UL (ref 0–0.2)
BASOPHILS NFR BLD: 0 % (ref 0–2)
BILIRUB SERPL-MCNC: 0.4 MG/DL (ref 0–1.2)
BILIRUB UR QL STRIP: NEGATIVE
BNP SERPL-MCNC: 1170 PG/ML (ref 0–450)
BUN SERPL-MCNC: 28 MG/DL (ref 6–23)
CALCIUM SERPL-MCNC: 10.6 MG/DL (ref 8.6–10.2)
CHLORIDE SERPL-SCNC: 97 MMOL/L (ref 98–107)
CLARITY UR: ABNORMAL
CO2 SERPL-SCNC: 27 MMOL/L (ref 22–29)
COLOR UR: YELLOW
CREAT SERPL-MCNC: 1.8 MG/DL (ref 0.5–1)
EOSINOPHIL # BLD: 0.11 K/UL (ref 0.05–0.5)
EOSINOPHILS RELATIVE PERCENT: 1 % (ref 0–6)
EPI CELLS #/AREA URNS HPF: ABNORMAL /HPF
ERYTHROCYTE [DISTWIDTH] IN BLOOD BY AUTOMATED COUNT: 14.2 % (ref 11.5–15)
FERRITIN SERPL-MCNC: 376 NG/ML
GFR SERPL CREATININE-BSD FRML MDRD: 30 ML/MIN/1.73M2
GLUCOSE SERPL-MCNC: 134 MG/DL (ref 74–99)
GLUCOSE UR STRIP-MCNC: NEGATIVE MG/DL
HCT VFR BLD AUTO: 40.8 % (ref 34–48)
HGB BLD-MCNC: 12.8 G/DL (ref 11.5–15.5)
HGB UR QL STRIP.AUTO: ABNORMAL
IRON SATN MFR SERPL: 11 % (ref 15–50)
IRON SERPL-MCNC: 29 UG/DL (ref 37–145)
KETONES UR STRIP-MCNC: NEGATIVE MG/DL
LEUKOCYTE ESTERASE UR QL STRIP: ABNORMAL
LYMPHOCYTES NFR BLD: 0.79 K/UL (ref 1.5–4)
LYMPHOCYTES RELATIVE PERCENT: 6 % (ref 20–42)
MAGNESIUM SERPL-MCNC: 1.8 MG/DL (ref 1.6–2.6)
MCH RBC QN AUTO: 35.1 PG (ref 26–35)
MCHC RBC AUTO-ENTMCNC: 31.4 G/DL (ref 32–34.5)
MCV RBC AUTO: 111.8 FL (ref 80–99.9)
MONOCYTES NFR BLD: 0.56 K/UL (ref 0.1–0.95)
MONOCYTES NFR BLD: 4 % (ref 2–12)
NEUTROPHILS NFR BLD: 89 % (ref 43–80)
NEUTS SEG NFR BLD: 11.44 K/UL (ref 1.8–7.3)
NITRITE UR QL STRIP: NEGATIVE
PH UR STRIP: 5 [PH] (ref 5–9)
PHOSPHATE SERPL-MCNC: 3.1 MG/DL (ref 2.5–4.5)
PLATELET # BLD AUTO: 153 K/UL (ref 130–450)
PMV BLD AUTO: 12.9 FL (ref 7–12)
POTASSIUM SERPL-SCNC: 4 MMOL/L (ref 3.5–5)
PROT SERPL-MCNC: 6.9 G/DL (ref 6.4–8.3)
PROT UR STRIP-MCNC: ABNORMAL MG/DL
PTH-INTACT SERPL-MCNC: 114.2 PG/ML (ref 15–65)
RBC # BLD AUTO: 3.65 M/UL (ref 3.5–5.5)
RBC # BLD: NORMAL 10*6/UL
RBC #/AREA URNS HPF: ABNORMAL /HPF
SODIUM SERPL-SCNC: 135 MMOL/L (ref 132–146)
SP GR UR STRIP: 1.02 (ref 1–1.03)
TIBC SERPL-MCNC: 268 UG/DL (ref 250–450)
UROBILINOGEN UR STRIP-ACNC: 0.2 EU/DL (ref 0–1)
WBC #/AREA URNS HPF: ABNORMAL /HPF
WBC OTHER # BLD: 12.9 K/UL (ref 4.5–11.5)

## 2023-11-08 PROCEDURE — 82570 ASSAY OF URINE CREATININE: CPT

## 2023-11-08 PROCEDURE — 83550 IRON BINDING TEST: CPT

## 2023-11-08 PROCEDURE — 82306 VITAMIN D 25 HYDROXY: CPT

## 2023-11-08 PROCEDURE — 80053 COMPREHEN METABOLIC PANEL: CPT

## 2023-11-08 PROCEDURE — 83880 ASSAY OF NATRIURETIC PEPTIDE: CPT

## 2023-11-08 PROCEDURE — 99214 OFFICE O/P EST MOD 30 MIN: CPT

## 2023-11-08 PROCEDURE — 85025 COMPLETE CBC W/AUTO DIFF WBC: CPT

## 2023-11-08 PROCEDURE — 82043 UR ALBUMIN QUANTITATIVE: CPT

## 2023-11-08 PROCEDURE — 80158 DRUG ASSAY CYCLOSPORINE: CPT

## 2023-11-08 PROCEDURE — 83970 ASSAY OF PARATHORMONE: CPT

## 2023-11-08 PROCEDURE — 82728 ASSAY OF FERRITIN: CPT

## 2023-11-08 PROCEDURE — 83735 ASSAY OF MAGNESIUM: CPT

## 2023-11-08 PROCEDURE — 83540 ASSAY OF IRON: CPT

## 2023-11-08 PROCEDURE — 36415 COLL VENOUS BLD VENIPUNCTURE: CPT

## 2023-11-08 PROCEDURE — 84100 ASSAY OF PHOSPHORUS: CPT

## 2023-11-08 PROCEDURE — 81001 URINALYSIS AUTO W/SCOPE: CPT

## 2023-11-08 NOTE — PROGRESS NOTES
Congestive Heart Failure [address] [facility]     Referring Provider: Dr. Valentine Altman  Primary Care Physician: Domonique House DO   Cardiologist: Dr. Nikki Montgomery  Nephrologist: Dr. Tommy Cabrera:     Duran Kim is a 80 y.o. (1955) female with a history of HFpEF (EF> 50%), most recent EF:  Lab Results   Component Value Date    LVEF 55 07/24/2023    [address] [facility] Nuclear Medicine Scan 06/16/2015         She presents to the CHF clinic for ongoing evaluation and monitoring of heart failure. In the CHF clinic today she denies any adverse symptoms except:  [] Dizziness or lightheadedness   [] Syncope or near syncope  [] Recent Fall  [] Shortness of breath at rest   [] Dyspnea with exertion  [] Decline in functional capacity (unable to perform activities they had previously been able to do)  [x] Fatigue - unchanged   [] Orthopnea  [] PND  [] Nocturnal cough  [] Hemoptysis  [] Chest pain, pressure, or discomfort  [x] Palpitations- feels like heart races when lying down goes away after a couple minutes. [] Abdominal distention  [] Abdominal bloating  [] Early satiety  [] Blood in stool   [] Diarrhea  [] Constipation  [] Nausea/Vomiting  [] Decreased urinary response to oral diuretic   [] Scrotal swelling   [x] Lower extremity edema (RLE trace/ LLE trace)  [] Used PRN doses of oral diuretic   [] Weight gain    Wt Readings from Last 3 Encounters:   11/08/23 94.3 kg (208 lb)   10/25/23 94.3 kg (207 lb 12.8 oz)   10/09/23 90.7 kg (200 lb)       SOCIAL HISTORY:  [x] Denies tobacco, alcohol or illicit drug abuse  [] Tobacco use:  [] ETOH use:  [] Illicit drug use:        MEDICATIONS:    Allergies   Allergen Reactions    Cipro [Ciprofloxacin Hcl]     Macrodantin [Nitrofurantoin Macrocrystal] Nausea Only    Sulfa Antibiotics Nausea Only     Prior to Visit Medications    Medication Sig Taking?  Authorizing Provider   predniSONE (DELTASONE) 5 MG tablet Take 1

## 2023-11-09 LAB
CREAT UR-MCNC: 81.8 MG/DL (ref 29–226)
MICROALBUMIN UR-MCNC: 150 MG/L (ref 0–19)
MICROALBUMIN/CREAT UR-RTO: 183 MCG/MG CREAT (ref 0–30)

## 2023-11-10 LAB — CYCLOSPORINE BLD-MCNC: 95 UG/L (ref 50–300)

## 2023-11-27 NOTE — PROGRESS NOTES
Follow up call completed.  Patient states she is doing a lot better.   Speech Language Pathology      NAME:  Osmel Escobar  :  1955  DATE: 3/16/2022  ROOM:  [facility]/8006-54    Attempted ongoing Speech-Language Pathology intervention for dysphagia. Pt unavailable at this time due to:  [] HOLD per RN/ medical staff d/t medical status   [] Off unit for testing/ procedure    [] With medical staff   [] Declined intervention  [x] Sleeping/ Lethargic still not alert enough for safe PO attempts   [] Other:     SLP to continue previously established POC and re-attempt as able.             Confusion [R41.0]  Acute respiratory failure with hypoxia ([address] [J96.01]  Acute respiratory failure with hypoxemia ([address] [J96.01]        Amelie Nam MSCCC/SLP  Speech Language Pathologist  [id number]

## 2023-11-30 ENCOUNTER — OFFICE VISIT (OUTPATIENT)
Dept: INTERNAL MEDICINE CLINIC | Age: 68
End: 2023-11-30
Payer: MEDICARE

## 2023-11-30 VITALS
WEIGHT: 211 LBS | BODY MASS INDEX: 38.83 KG/M2 | SYSTOLIC BLOOD PRESSURE: 132 MMHG | RESPIRATION RATE: 16 BRPM | HEART RATE: 74 BPM | HEIGHT: 62 IN | DIASTOLIC BLOOD PRESSURE: 64 MMHG | TEMPERATURE: 97.6 F | OXYGEN SATURATION: 94 %

## 2023-11-30 DIAGNOSIS — I10 PRIMARY HYPERTENSION: ICD-10-CM

## 2023-11-30 DIAGNOSIS — I50.22 CHRONIC SYSTOLIC (CONGESTIVE) HEART FAILURE (HCC): ICD-10-CM

## 2023-11-30 DIAGNOSIS — F31.9 BIPOLAR 1 DISORDER (HCC): ICD-10-CM

## 2023-11-30 DIAGNOSIS — N18.32 STAGE 3B CHRONIC KIDNEY DISEASE (HCC): ICD-10-CM

## 2023-11-30 DIAGNOSIS — H92.01 OTALGIA, RIGHT: ICD-10-CM

## 2023-11-30 DIAGNOSIS — E03.9 ACQUIRED HYPOTHYROIDISM: ICD-10-CM

## 2023-11-30 DIAGNOSIS — F33.0 MAJOR DEPRESSIVE DISORDER, RECURRENT, MILD (HCC): ICD-10-CM

## 2023-11-30 DIAGNOSIS — I82.519 CHRONIC DEEP VEIN THROMBOSIS (DVT) OF FEMORAL VEIN, UNSPECIFIED LATERALITY (HCC): ICD-10-CM

## 2023-11-30 DIAGNOSIS — R73.01 IMPAIRED FASTING GLUCOSE: Primary | ICD-10-CM

## 2023-11-30 DIAGNOSIS — E78.2 MIXED HYPERLIPIDEMIA: ICD-10-CM

## 2023-11-30 LAB — HBA1C MFR BLD: 5.8 %

## 2023-11-30 PROCEDURE — G8417 CALC BMI ABV UP PARAM F/U: HCPCS | Performed by: NEUROMUSCULOSKELETAL MEDICINE & OMM

## 2023-11-30 PROCEDURE — 3078F DIAST BP <80 MM HG: CPT | Performed by: NEUROMUSCULOSKELETAL MEDICINE & OMM

## 2023-11-30 PROCEDURE — 3075F SYST BP GE 130 - 139MM HG: CPT | Performed by: NEUROMUSCULOSKELETAL MEDICINE & OMM

## 2023-11-30 PROCEDURE — 1090F PRES/ABSN URINE INCON ASSESS: CPT | Performed by: NEUROMUSCULOSKELETAL MEDICINE & OMM

## 2023-11-30 PROCEDURE — G8484 FLU IMMUNIZE NO ADMIN: HCPCS | Performed by: NEUROMUSCULOSKELETAL MEDICINE & OMM

## 2023-11-30 PROCEDURE — 83036 HEMOGLOBIN GLYCOSYLATED A1C: CPT | Performed by: NEUROMUSCULOSKELETAL MEDICINE & OMM

## 2023-11-30 PROCEDURE — 99213 OFFICE O/P EST LOW 20 MIN: CPT | Performed by: NEUROMUSCULOSKELETAL MEDICINE & OMM

## 2023-11-30 PROCEDURE — 3017F COLORECTAL CA SCREEN DOC REV: CPT | Performed by: NEUROMUSCULOSKELETAL MEDICINE & OMM

## 2023-11-30 PROCEDURE — 1123F ACP DISCUSS/DSCN MKR DOCD: CPT | Performed by: NEUROMUSCULOSKELETAL MEDICINE & OMM

## 2023-11-30 PROCEDURE — G8399 PT W/DXA RESULTS DOCUMENT: HCPCS | Performed by: NEUROMUSCULOSKELETAL MEDICINE & OMM

## 2023-11-30 PROCEDURE — G8427 DOCREV CUR MEDS BY ELIG CLIN: HCPCS | Performed by: NEUROMUSCULOSKELETAL MEDICINE & OMM

## 2023-11-30 PROCEDURE — 1036F TOBACCO NON-USER: CPT | Performed by: NEUROMUSCULOSKELETAL MEDICINE & OMM

## 2023-11-30 RX ORDER — FERROUS SULFATE 325(65) MG
325 TABLET ORAL
COMMUNITY

## 2023-11-30 ASSESSMENT — ENCOUNTER SYMPTOMS
BACK PAIN: 0
CHEST TIGHTNESS: 0
CONSTIPATION: 0
SHORTNESS OF BREATH: 0
CHOKING: 0
ABDOMINAL DISTENTION: 0
BLOOD IN STOOL: 0
VOMITING: 0
ABDOMINAL PAIN: 0
WHEEZING: 0
DIARRHEA: 0
NAUSEA: 0
COUGH: 0

## 2023-11-30 NOTE — PROGRESS NOTES
Surgical History:   Procedure Laterality Date    CATARACT REMOVAL Right 2023    approx     SECTION  1985    one normal delivery    COLECTOMY N/A 01/15/2022    DIAGNOSTIC  LAPAROSCOPY LYSIS OF ADHESIONS performed by Rodney Munoz MD at [facility] CATH LAB PROCEDURE      normal coronaries and  normal coronaries    DIALYSIS FISTULA CREATION  march and 2012    phase one and two patient will start dialysis when needed     [facility] [address]    transfemoral venous bypass grafts    IR IVC FILTER PLACEMENT W IMAGING  2022    IR IVC FILTER PLACEMENT W IMAGING 2022 [facility] SPECIAL PROCEDURES    KIDNEY TRANSPLANT  2016    KIDNEY TRANSPLANT  2015    KIDNEY TRANSPLANT  2015    PARATHYROIDECTOMY  2006    left parathyroid  implant and parathyroidectomy    TRANSESOPHAGEAL ECHOCARDIOGRAM N/A 2022    TRANSESOPHAGEAL ECHOCARDIOGRAM WITH BUBBLE STUDY performed by Ana Walter MD at [address]        The 10-year ASCVD risk score (Noble DK, et al., 2019) is: 7.5%    Values used to calculate the score:      Age: 80 years      Sex: Female      Is Non- : No      Diabetic: No      Tobacco smoker: No      Systolic Blood Pressure: 767 mmHg      Is BP treated: No      HDL Cholesterol: 66 mg/dL      Total Cholesterol: 242 mg/dL     Educational materials and/or home exercises printed for patient's review and were included inpatient instructions on his/her After Visit Summary and given to patient at the end of visit.     regarding above diagnosis, including possible risks and complications,  especially if left uncontrolled. Counseled regarding the possible side effects, risks, benefits and alternatives to treatment; patient and/or guardian verbalizes understanding, agrees, feels comfortable with and wishes to proceed withabove treatment plan.      Advised patient to call with any new medication issues, and read all Rx infofrom

## 2023-12-04 ENCOUNTER — HOSPITAL ENCOUNTER (OUTPATIENT)
Age: 68
Discharge: HOME | End: 2023-12-04
Payer: MEDICARE

## 2023-12-04 LAB
25(OH)D3 SERPL-MCNC: 41 NG/ML (ref 30–100)
ALBUMIN SERPL-MCNC: 4.1 G/DL (ref 3.5–5.2)
ALBUMIN SERPL-MCNC: 4.1 G/DL (ref 3.5–5.2)
ALP SERPL-CCNC: 72 U/L (ref 35–104)
ALT SERPL-CCNC: 9 U/L (ref 0–32)
ANION GAP SERPL CALCULATED.3IONS-SCNC: 13 MMOL/L (ref 7–16)
AST SERPL-CCNC: 12 U/L (ref 0–31)
BASOPHILS # BLD: 0.04 K/UL (ref 0–0.2)
BASOPHILS NFR BLD: 0 % (ref 0–2)
BILIRUB DIRECT SERPL-MCNC: <0.2 MG/DL (ref 0–0.3)
BILIRUB INDIRECT SERPL-MCNC: NORMAL MG/DL (ref 0–1)
BILIRUB SERPL-MCNC: 0.3 MG/DL (ref 0–1.2)
BUN SERPL-MCNC: 27 MG/DL (ref 6–23)
CALCIUM SERPL-MCNC: 10.4 MG/DL (ref 8.6–10.2)
CHLORIDE SERPL-SCNC: 100 MMOL/L (ref 98–107)
CO2 SERPL-SCNC: 26 MMOL/L (ref 22–29)
CREAT SERPL-MCNC: 1.7 MG/DL (ref 0.5–1)
CREAT UR-MCNC: 49.2 MG/DL (ref 29–226)
EOSINOPHIL # BLD: 0.07 K/UL (ref 0.05–0.5)
EOSINOPHILS RELATIVE PERCENT: 1 % (ref 0–6)
ERYTHROCYTE [DISTWIDTH] IN BLOOD BY AUTOMATED COUNT: 14.2 % (ref 11.5–15)
GFR SERPL CREATININE-BSD FRML MDRD: 33 ML/MIN/1.73M2
GLUCOSE SERPL-MCNC: 131 MG/DL (ref 74–99)
HCT VFR BLD AUTO: 42 % (ref 34–48)
HGB BLD-MCNC: 13.1 G/DL (ref 11.5–15.5)
IMM GRANULOCYTES # BLD AUTO: 0.07 K/UL (ref 0–0.58)
IMM GRANULOCYTES NFR BLD: 1 % (ref 0–5)
LYMPHOCYTES NFR BLD: 0.82 K/UL (ref 1.5–4)
LYMPHOCYTES RELATIVE PERCENT: 9 % (ref 20–42)
MAGNESIUM SERPL-MCNC: 1.7 MG/DL (ref 1.6–2.6)
MCH RBC QN AUTO: 35 PG (ref 26–35)
MCHC RBC AUTO-ENTMCNC: 31.2 G/DL (ref 32–34.5)
MCV RBC AUTO: 112.3 FL (ref 80–99.9)
MICROALBUMIN UR-MCNC: 106 MG/L (ref 0–19)
MICROALBUMIN/CREAT UR-RTO: 214 MCG/MG CREAT (ref 0–30)
MONOCYTES NFR BLD: 0.63 K/UL (ref 0.1–0.95)
MONOCYTES NFR BLD: 7 % (ref 2–12)
NEUTROPHILS NFR BLD: 82 % (ref 43–80)
NEUTS SEG NFR BLD: 7.61 K/UL (ref 1.8–7.3)
PHOSPHATE SERPL-MCNC: 3.5 MG/DL (ref 2.5–4.5)
PLATELET # BLD AUTO: 138 K/UL (ref 130–450)
PMV BLD AUTO: 12.8 FL (ref 7–12)
POTASSIUM SERPL-SCNC: 4.3 MMOL/L (ref 3.5–5)
PROT SERPL-MCNC: 6.6 G/DL (ref 6.4–8.3)
PTH-INTACT SERPL-MCNC: 152.5 PG/ML (ref 15–65)
RBC # BLD AUTO: 3.74 M/UL (ref 3.5–5.5)
RBC # BLD: NORMAL 10*6/UL
SODIUM SERPL-SCNC: 139 MMOL/L (ref 132–146)
TOTAL PROTEIN, URINE: 21 MG/DL (ref 0–12)
WBC OTHER # BLD: 9.2 K/UL (ref 4.5–11.5)

## 2023-12-04 PROCEDURE — 80076 HEPATIC FUNCTION PANEL: CPT

## 2023-12-04 PROCEDURE — 83970 ASSAY OF PARATHORMONE: CPT

## 2023-12-04 PROCEDURE — 82570 ASSAY OF URINE CREATININE: CPT

## 2023-12-04 PROCEDURE — 36415 COLL VENOUS BLD VENIPUNCTURE: CPT

## 2023-12-04 PROCEDURE — 82306 VITAMIN D 25 HYDROXY: CPT

## 2023-12-04 PROCEDURE — 80158 DRUG ASSAY CYCLOSPORINE: CPT

## 2023-12-04 PROCEDURE — 85025 COMPLETE CBC W/AUTO DIFF WBC: CPT

## 2023-12-04 PROCEDURE — 80069 RENAL FUNCTION PANEL: CPT

## 2023-12-04 PROCEDURE — 84156 ASSAY OF PROTEIN URINE: CPT

## 2023-12-04 PROCEDURE — 83735 ASSAY OF MAGNESIUM: CPT

## 2023-12-04 PROCEDURE — 82043 UR ALBUMIN QUANTITATIVE: CPT

## 2023-12-06 LAB — CYCLOSPORINE BLD-MCNC: 117 UG/L (ref 50–300)

## 2023-12-11 RX ORDER — LEVOTHYROXINE SODIUM 0.05 MG/1
50 TABLET ORAL DAILY
Qty: 90 TABLET | Refills: 1 | Status: SHIPPED | OUTPATIENT
Start: 2023-12-11

## 2023-12-13 ENCOUNTER — HOSPITAL ENCOUNTER (OUTPATIENT)
Dept: OTHER | Age: 68
Setting detail: THERAPIES SERIES
Discharge: HOME | End: 2023-12-13
Payer: MEDICARE

## 2023-12-13 VITALS
SYSTOLIC BLOOD PRESSURE: 120 MMHG | DIASTOLIC BLOOD PRESSURE: 70 MMHG | OXYGEN SATURATION: 94 % | WEIGHT: 212.4 LBS | BODY MASS INDEX: 38.85 KG/M2 | HEART RATE: 74 BPM | RESPIRATION RATE: 18 BRPM

## 2023-12-13 LAB
ANION GAP SERPL CALCULATED.3IONS-SCNC: 13 MMOL/L (ref 7–16)
BNP SERPL-MCNC: 1024 PG/ML (ref 0–450)
BUN SERPL-MCNC: 29 MG/DL (ref 6–23)
CALCIUM SERPL-MCNC: 10.1 MG/DL (ref 8.6–10.2)
CHLORIDE SERPL-SCNC: 98 MMOL/L (ref 98–107)
CO2 SERPL-SCNC: 24 MMOL/L (ref 22–29)
CREAT SERPL-MCNC: 1.6 MG/DL (ref 0.5–1)
GFR SERPL CREATININE-BSD FRML MDRD: 36 ML/MIN/1.73M2
GLUCOSE SERPL-MCNC: 125 MG/DL (ref 74–99)
POTASSIUM SERPL-SCNC: 4.2 MMOL/L (ref 3.5–5)
SODIUM SERPL-SCNC: 135 MMOL/L (ref 132–146)

## 2023-12-13 PROCEDURE — 36415 COLL VENOUS BLD VENIPUNCTURE: CPT

## 2023-12-13 PROCEDURE — 80048 BASIC METABOLIC PNL TOTAL CA: CPT

## 2023-12-13 PROCEDURE — 99214 OFFICE O/P EST MOD 30 MIN: CPT

## 2023-12-13 PROCEDURE — 83880 ASSAY OF NATRIURETIC PEPTIDE: CPT

## 2023-12-13 NOTE — PROGRESS NOTES
Future Appointments   Date Time Provider [address]   1/19/2024  9:30 PM [address] LAB ROOM 1 [facility] SLEEP [facility]   3/5/2024  8:30 AM Malkovits, Marsh Cowden, DO [facility]   3/20/2024  9:30 AM [facility] CHF ROOM 2 [facility] CHF [facility]   3/26/2024  8:30 AM Erendira Hendrix MD [facility]   3/27/2024  9:15 AM Bert Fragoso MD [facility]

## 2023-12-20 LAB
ALBUMIN (G/DL) IN SER/PLAS EXTERNAL: NORMAL
ANION GAP IN SER/PLAS EXTERNAL: 14 MMOL/L
CALCIUM (MG/DL) IN SER/PLAS EXTERNAL: 10.3 MG/DL
CARBON DIOXIDE, TOTAL (MMOL/L) IN SER/PLAS EXTERNAL: 24 MMOL/L
CHLORIDE (MMOL/L) IN SER/PLAS EXTERNAL: 100 MMOL/L
CREATININE (MG/DL) IN SER/PLAS EXTERNAL: 1.5 MG/DL
ERYTHROCYTE DISTRIBUTION WIDTH (RATIO) BY AUTOMATED COUNT EXTERNAL: NORMAL
ERYTHROCYTE MEAN CORPUSCULAR HEMOGLOBIN (PG) BY AUTOMATED COUNT EXTERNAL: NORMAL
ERYTHROCYTE MEAN CORPUSCULAR HGB CONCENTRATION (G/DL) BY AUTOMATED EXT: NORMAL
ERYTHROCYTE MEAN CORPUSCULAR VOLUME (FL) BY AUTOMATED COUNT EXTERNAL: NORMAL
ERYTHROCYTES (10*6/UL) IN BLOOD BY AUTOMATED COUNT EXTERNAL: NORMAL
GLOMERULAR FILTRATION RATE ML/MIN/1.73 SQ M.PREDICTED EXTERNAL: 37 ML/MIN/1.73M*2
GLUCOSE (MG/DL) IN SER/PLAS EXTERNAL: 127 MG/DL
HEMATOCRIT (%) IN BLOOD BY AUTOMATED COUNT EXTERNAL: 38.1 %
HEMOGLOBIN (G/DL) IN BLOOD EXTERNAL: 11.9 G/DL
LEUKOCYTES (10*3/UL) IN BLOOD BY AUTOMATED COUNT EXTERNAL: NORMAL
NRBC (PER 100 WBCS) BY AUTOMATED COUNT EXTERNAL: NORMAL
PHOSPHORUS: NORMAL
PLATELET MEAN VOLUME (FL) IN BLOOD BY AUTOMATED COUNT EXTERNAL: NORMAL
PLATELETS (10*3/UL) IN BLOOD AUTOMATED COUNT EXTERNAL: 146 X10*3/UL
POTASSIUM (MMOL/L) IN SER/PLAS EXTERNAL: 4.6 MMOL/L
RBC: 3.43
SODIUM (MMOL/L) IN SER/PLAS EXTERNAL: 138 MMOL/L
UREA NITROGEN (MG/DL) IN SER/PLAS EXTERNAL: 32 MG/DL
WBC: 10.8

## 2024-01-02 ENCOUNTER — HOSPITAL ENCOUNTER (OUTPATIENT)
Age: 69
Discharge: HOME | End: 2024-01-02
Payer: MEDICARE

## 2024-01-02 LAB
BASOPHILS # BLD: 0 K/UL (ref 0–0.2)
BASOPHILS NFR BLD: 0 % (ref 0–2)
EOSINOPHIL # BLD: 0 K/UL (ref 0.05–0.5)
EOSINOPHILS RELATIVE PERCENT: 0 % (ref 0–6)
ERYTHROCYTE [DISTWIDTH] IN BLOOD BY AUTOMATED COUNT: 14.1 % (ref 11.5–15)
HCT VFR BLD AUTO: 41.7 % (ref 34–48)
HGB BLD-MCNC: 13.4 G/DL (ref 11.5–15.5)
LYMPHOCYTES NFR BLD: 0.29 K/UL (ref 1.5–4)
LYMPHOCYTES RELATIVE PERCENT: 3 % (ref 20–42)
MCH RBC QN AUTO: 35.6 PG (ref 26–35)
MCHC RBC AUTO-ENTMCNC: 32.1 G/DL (ref 32–34.5)
MCV RBC AUTO: 110.9 FL (ref 80–99.9)
MONOCYTES NFR BLD: 0.39 K/UL (ref 0.1–0.95)
MONOCYTES NFR BLD: 4 % (ref 2–12)
NEUTROPHILS NFR BLD: 94 % (ref 43–80)
NEUTS SEG NFR BLD: 10.42 K/UL (ref 1.8–7.3)
PLATELET # BLD AUTO: 126 K/UL (ref 130–450)
PMV BLD AUTO: 12.8 FL (ref 7–12)
RBC # BLD AUTO: 3.76 M/UL (ref 3.5–5.5)
RBC # BLD: NORMAL 10*6/UL
WBC OTHER # BLD: 11.1 K/UL (ref 4.5–11.5)

## 2024-01-02 PROCEDURE — 36415 COLL VENOUS BLD VENIPUNCTURE: CPT

## 2024-01-02 PROCEDURE — 85025 COMPLETE CBC W/AUTO DIFF WBC: CPT

## 2024-01-04 NOTE — PROGRESS NOTES
Report called to Maryanne @ .  Pt picked up by and transferred to [facility] per [facility]     Family notified of transfer knife

## 2024-01-13 RX ORDER — FOLIC ACID 1 MG/1
1 TABLET ORAL DAILY
Qty: 90 TABLET | OUTPATIENT
Start: 2024-01-13

## 2024-01-16 ENCOUNTER — TELEPHONE (OUTPATIENT)
Dept: SLEEP CENTER | Age: 69
End: 2024-01-16

## 2024-02-06 ENCOUNTER — HOSPITAL ENCOUNTER (OUTPATIENT)
Age: 69
Discharge: HOME | End: 2024-02-06
Payer: MEDICARE

## 2024-02-06 LAB
BASOPHILS # BLD: 0.04 K/UL (ref 0–0.2)
BASOPHILS NFR BLD: 0 % (ref 0–2)
EOSINOPHIL # BLD: 0.05 K/UL (ref 0.05–0.5)
EOSINOPHILS RELATIVE PERCENT: 0 % (ref 0–6)
ERYTHROCYTE [DISTWIDTH] IN BLOOD BY AUTOMATED COUNT: 14.3 % (ref 11.5–15)
HCT VFR BLD AUTO: 42.9 % (ref 34–48)
HGB BLD-MCNC: 13.7 G/DL (ref 11.5–15.5)
IMM GRANULOCYTES # BLD AUTO: 0.05 K/UL (ref 0–0.58)
IMM GRANULOCYTES NFR BLD: 0 % (ref 0–5)
LYMPHOCYTES NFR BLD: 0.81 K/UL (ref 1.5–4)
LYMPHOCYTES RELATIVE PERCENT: 7 % (ref 20–42)
MCH RBC QN AUTO: 34.8 PG (ref 26–35)
MCHC RBC AUTO-ENTMCNC: 31.9 G/DL (ref 32–34.5)
MCV RBC AUTO: 108.9 FL (ref 80–99.9)
MONOCYTES NFR BLD: 0.88 K/UL (ref 0.1–0.95)
MONOCYTES NFR BLD: 7 % (ref 2–12)
NEUTROPHILS NFR BLD: 85 % (ref 43–80)
NEUTS SEG NFR BLD: 10.06 K/UL (ref 1.8–7.3)
PLATELET # BLD AUTO: 130 K/UL (ref 130–450)
PMV BLD AUTO: 12.8 FL (ref 7–12)
RBC # BLD AUTO: 3.94 M/UL (ref 3.5–5.5)
WBC OTHER # BLD: 11.9 K/UL (ref 4.5–11.5)

## 2024-02-06 PROCEDURE — 85025 COMPLETE CBC W/AUTO DIFF WBC: CPT

## 2024-02-06 PROCEDURE — 36415 COLL VENOUS BLD VENIPUNCTURE: CPT

## 2024-02-20 ENCOUNTER — HOSPITAL ENCOUNTER (OUTPATIENT)
Age: 69
Discharge: HOME | End: 2024-02-20
Payer: MEDICARE

## 2024-02-20 LAB
25(OH)D3 SERPL-MCNC: 35.7 NG/ML (ref 30–100)
ALBUMIN SERPL-MCNC: 4.1 G/DL (ref 3.5–5.2)
ALP SERPL-CCNC: 74 U/L (ref 35–104)
ALT SERPL-CCNC: 10 U/L (ref 0–32)
ANION GAP SERPL CALCULATED.3IONS-SCNC: 13 MMOL/L (ref 7–16)
AST SERPL-CCNC: 12 U/L (ref 0–31)
BACTERIA URNS QL MICRO: ABNORMAL
BILIRUB SERPL-MCNC: 0.3 MG/DL (ref 0–1.2)
BILIRUB UR QL STRIP: NEGATIVE
BUN SERPL-MCNC: 27 MG/DL (ref 6–23)
CALCIUM SERPL-MCNC: 9.9 MG/DL (ref 8.6–10.2)
CHLORIDE SERPL-SCNC: 97 MMOL/L (ref 98–107)
CLARITY UR: CLEAR
CO2 SERPL-SCNC: 26 MMOL/L (ref 22–29)
COLOR UR: YELLOW
CREAT SERPL-MCNC: 1.8 MG/DL (ref 0.5–1)
CREAT UR-MCNC: 148.7 MG/DL (ref 29–226)
EPI CELLS #/AREA URNS HPF: ABNORMAL /HPF
ERYTHROCYTE [DISTWIDTH] IN BLOOD BY AUTOMATED COUNT: 14.6 % (ref 11.5–15)
FERRITIN SERPL-MCNC: 350 NG/ML
GFR SERPL CREATININE-BSD FRML MDRD: 31 ML/MIN/1.73M2
GLUCOSE SERPL-MCNC: 222 MG/DL (ref 74–99)
GLUCOSE UR STRIP-MCNC: NEGATIVE MG/DL
HCT VFR BLD AUTO: 40.7 % (ref 34–48)
HGB BLD-MCNC: 12.8 G/DL (ref 11.5–15.5)
HGB UR QL STRIP.AUTO: ABNORMAL
IRON SATN MFR SERPL: 23 % (ref 15–50)
IRON SERPL-MCNC: 62 UG/DL (ref 37–145)
KETONES UR STRIP-MCNC: ABNORMAL MG/DL
LEUKOCYTE ESTERASE UR QL STRIP: ABNORMAL
MAGNESIUM SERPL-MCNC: 1.8 MG/DL (ref 1.6–2.6)
MCH RBC QN AUTO: 34.8 PG (ref 26–35)
MCHC RBC AUTO-ENTMCNC: 31.4 G/DL (ref 32–34.5)
MCV RBC AUTO: 110.6 FL (ref 80–99.9)
NITRITE UR QL STRIP: NEGATIVE
PH UR STRIP: 5.5 [PH] (ref 5–9)
PHOSPHATE SERPL-MCNC: 3 MG/DL (ref 2.5–4.5)
PLATELET, FLUORESCENCE: 111 K/UL (ref 130–450)
PMV BLD AUTO: 13.2 FL (ref 7–12)
POTASSIUM SERPL-SCNC: 4.3 MMOL/L (ref 3.5–5)
PROT SERPL-MCNC: 6.3 G/DL (ref 6.4–8.3)
PROT UR STRIP-MCNC: NEGATIVE MG/DL
PTH-INTACT SERPL-MCNC: 147 PG/ML (ref 15–65)
RBC # BLD AUTO: 3.68 M/UL (ref 3.5–5.5)
RBC #/AREA URNS HPF: ABNORMAL /HPF
SODIUM SERPL-SCNC: 136 MMOL/L (ref 132–146)
SP GR UR STRIP: 1.02 (ref 1–1.03)
TIBC SERPL-MCNC: 273 UG/DL (ref 250–450)
TOTAL PROTEIN, URINE: 24 MG/DL (ref 0–12)
URATE SERPL-MCNC: 8 MG/DL (ref 2.4–5.7)
URINE TOTAL PROTEIN CREATININE RATIO: 0.16 (ref 0–0.2)
UROBILINOGEN UR STRIP-ACNC: 0.2 EU/DL (ref 0–1)
WBC #/AREA URNS HPF: ABNORMAL /HPF
WBC OTHER # BLD: 9.9 K/UL (ref 4.5–11.5)

## 2024-02-20 PROCEDURE — 83540 ASSAY OF IRON: CPT

## 2024-02-20 PROCEDURE — 82728 ASSAY OF FERRITIN: CPT

## 2024-02-20 PROCEDURE — 80053 COMPREHEN METABOLIC PANEL: CPT

## 2024-02-20 PROCEDURE — 84100 ASSAY OF PHOSPHORUS: CPT

## 2024-02-20 PROCEDURE — 82570 ASSAY OF URINE CREATININE: CPT

## 2024-02-20 PROCEDURE — 84156 ASSAY OF PROTEIN URINE: CPT

## 2024-02-20 PROCEDURE — 85027 COMPLETE CBC AUTOMATED: CPT

## 2024-02-20 PROCEDURE — 84550 ASSAY OF BLOOD/URIC ACID: CPT

## 2024-02-20 PROCEDURE — 83550 IRON BINDING TEST: CPT

## 2024-02-20 PROCEDURE — 81001 URINALYSIS AUTO W/SCOPE: CPT

## 2024-02-20 PROCEDURE — 82306 VITAMIN D 25 HYDROXY: CPT

## 2024-02-20 PROCEDURE — 80158 DRUG ASSAY CYCLOSPORINE: CPT

## 2024-02-20 PROCEDURE — 83735 ASSAY OF MAGNESIUM: CPT

## 2024-02-20 PROCEDURE — 83970 ASSAY OF PARATHORMONE: CPT

## 2024-02-20 PROCEDURE — 36415 COLL VENOUS BLD VENIPUNCTURE: CPT

## 2024-02-23 LAB — CYCLOSPORINE BLD-MCNC: 111 UG/L (ref 50–300)

## 2024-03-01 ENCOUNTER — HOSPITAL ENCOUNTER (OUTPATIENT)
Age: 69
Discharge: HOME | End: 2024-03-01
Payer: MEDICARE

## 2024-03-01 LAB
25(OH)D3 SERPL-MCNC: 36.4 NG/ML (ref 30–100)
ALBUMIN SERPL-MCNC: 3.8 G/DL (ref 3.5–5.2)
ANION GAP SERPL CALCULATED.3IONS-SCNC: 14 MMOL/L (ref 7–16)
BASOPHILS # BLD: 0 K/UL (ref 0–0.2)
BASOPHILS NFR BLD: 0 % (ref 0–2)
BUN SERPL-MCNC: 30 MG/DL (ref 6–23)
CALCIUM SERPL-MCNC: 10.3 MG/DL (ref 8.6–10.2)
CHLORIDE SERPL-SCNC: 99 MMOL/L (ref 98–107)
CO2 SERPL-SCNC: 25 MMOL/L (ref 22–29)
CREAT SERPL-MCNC: 2 MG/DL (ref 0.5–1)
CREAT UR-MCNC: 141.6 MG/DL (ref 29–226)
EOSINOPHIL # BLD: 0.1 K/UL (ref 0.05–0.5)
EOSINOPHILS RELATIVE PERCENT: 1 % (ref 0–6)
ERYTHROCYTE [DISTWIDTH] IN BLOOD BY AUTOMATED COUNT: 14.2 % (ref 11.5–15)
GFR SERPL CREATININE-BSD FRML MDRD: 27 ML/MIN/1.73M2
GLUCOSE SERPL-MCNC: 135 MG/DL (ref 74–99)
HCT VFR BLD AUTO: 37.2 % (ref 34–48)
HGB BLD-MCNC: 12 G/DL (ref 11.5–15.5)
LYMPHOCYTES NFR BLD: 0.5 K/UL (ref 1.5–4)
LYMPHOCYTES RELATIVE PERCENT: 4 % (ref 20–42)
MAGNESIUM SERPL-MCNC: 2 MG/DL (ref 1.6–2.6)
MCH RBC QN AUTO: 35.5 PG (ref 26–35)
MCHC RBC AUTO-ENTMCNC: 32.3 G/DL (ref 32–34.5)
MCV RBC AUTO: 110.1 FL (ref 80–99.9)
MONOCYTES NFR BLD: 0.4 K/UL (ref 0.1–0.95)
MONOCYTES NFR BLD: 4 % (ref 2–12)
NEUTROPHILS NFR BLD: 91 % (ref 43–80)
NEUTS SEG NFR BLD: 10.49 K/UL (ref 1.8–7.3)
PHOSPHATE SERPL-MCNC: 3.3 MG/DL (ref 2.5–4.5)
PLATELET, FLUORESCENCE: 156 K/UL (ref 130–450)
PMV BLD AUTO: 13 FL (ref 7–12)
POTASSIUM SERPL-SCNC: 5 MMOL/L (ref 3.5–5)
PTH-INTACT SERPL-MCNC: 141.9 PG/ML (ref 15–65)
RBC # BLD AUTO: 3.38 M/UL (ref 3.5–5.5)
RBC # BLD: ABNORMAL 10*6/UL
SODIUM SERPL-SCNC: 138 MMOL/L (ref 132–146)
WBC OTHER # BLD: 11.5 K/UL (ref 4.5–11.5)

## 2024-03-01 PROCEDURE — 83970 ASSAY OF PARATHORMONE: CPT

## 2024-03-01 PROCEDURE — 85025 COMPLETE CBC W/AUTO DIFF WBC: CPT

## 2024-03-01 PROCEDURE — 83735 ASSAY OF MAGNESIUM: CPT

## 2024-03-01 PROCEDURE — 36415 COLL VENOUS BLD VENIPUNCTURE: CPT

## 2024-03-01 PROCEDURE — 82570 ASSAY OF URINE CREATININE: CPT

## 2024-03-01 PROCEDURE — 80069 RENAL FUNCTION PANEL: CPT

## 2024-03-01 PROCEDURE — 80158 DRUG ASSAY CYCLOSPORINE: CPT

## 2024-03-01 PROCEDURE — 82306 VITAMIN D 25 HYDROXY: CPT

## 2024-03-05 ENCOUNTER — OFFICE VISIT (OUTPATIENT)
Dept: INTERNAL MEDICINE CLINIC | Age: 69
End: 2024-03-05
Payer: MEDICARE

## 2024-03-05 VITALS
TEMPERATURE: 97.9 F | HEART RATE: 98 BPM | OXYGEN SATURATION: 94 % | HEIGHT: 62 IN | DIASTOLIC BLOOD PRESSURE: 84 MMHG | SYSTOLIC BLOOD PRESSURE: 134 MMHG | WEIGHT: 212 LBS | BODY MASS INDEX: 39.01 KG/M2

## 2024-03-05 DIAGNOSIS — F31.9 BIPOLAR 1 DISORDER (HCC): ICD-10-CM

## 2024-03-05 DIAGNOSIS — I82.519 CHRONIC DEEP VEIN THROMBOSIS (DVT) OF FEMORAL VEIN, UNSPECIFIED LATERALITY (HCC): ICD-10-CM

## 2024-03-05 DIAGNOSIS — I50.22 CHRONIC SYSTOLIC (CONGESTIVE) HEART FAILURE (HCC): ICD-10-CM

## 2024-03-05 DIAGNOSIS — N18.32 STAGE 3B CHRONIC KIDNEY DISEASE (HCC): ICD-10-CM

## 2024-03-05 DIAGNOSIS — R73.03 PRE-DIABETES: ICD-10-CM

## 2024-03-05 DIAGNOSIS — E66.01 SEVERE OBESITY (BMI 35.0-39.9) WITH COMORBIDITY (HCC): ICD-10-CM

## 2024-03-05 DIAGNOSIS — I48.0 PAROXYSMAL ATRIAL FIBRILLATION (HCC): ICD-10-CM

## 2024-03-05 DIAGNOSIS — E78.2 MIXED HYPERLIPIDEMIA: ICD-10-CM

## 2024-03-05 DIAGNOSIS — J44.1 CHRONIC OBSTRUCTIVE PULMONARY DISEASE WITH (ACUTE) EXACERBATION (HCC): ICD-10-CM

## 2024-03-05 DIAGNOSIS — D68.9 CLOTTING DISORDER (HCC): ICD-10-CM

## 2024-03-05 DIAGNOSIS — Z94.0 KIDNEY TRANSPLANTED: ICD-10-CM

## 2024-03-05 DIAGNOSIS — F33.0 MAJOR DEPRESSIVE DISORDER, RECURRENT, MILD (HCC): ICD-10-CM

## 2024-03-05 DIAGNOSIS — R73.01 IMPAIRED FASTING GLUCOSE: Primary | ICD-10-CM

## 2024-03-05 LAB — HBA1C MFR BLD: 6.2 %

## 2024-03-05 PROCEDURE — 83036 HEMOGLOBIN GLYCOSYLATED A1C: CPT | Performed by: NEUROMUSCULOSKELETAL MEDICINE & OMM

## 2024-03-05 PROCEDURE — G8417 CALC BMI ABV UP PARAM F/U: HCPCS | Performed by: NEUROMUSCULOSKELETAL MEDICINE & OMM

## 2024-03-05 PROCEDURE — 3023F SPIROM DOC REV: CPT | Performed by: NEUROMUSCULOSKELETAL MEDICINE & OMM

## 2024-03-05 PROCEDURE — G8484 FLU IMMUNIZE NO ADMIN: HCPCS | Performed by: NEUROMUSCULOSKELETAL MEDICINE & OMM

## 2024-03-05 PROCEDURE — 99214 OFFICE O/P EST MOD 30 MIN: CPT | Performed by: NEUROMUSCULOSKELETAL MEDICINE & OMM

## 2024-03-05 PROCEDURE — 1090F PRES/ABSN URINE INCON ASSESS: CPT | Performed by: NEUROMUSCULOSKELETAL MEDICINE & OMM

## 2024-03-05 PROCEDURE — 3075F SYST BP GE 130 - 139MM HG: CPT | Performed by: NEUROMUSCULOSKELETAL MEDICINE & OMM

## 2024-03-05 PROCEDURE — 3017F COLORECTAL CA SCREEN DOC REV: CPT | Performed by: NEUROMUSCULOSKELETAL MEDICINE & OMM

## 2024-03-05 PROCEDURE — 1123F ACP DISCUSS/DSCN MKR DOCD: CPT | Performed by: NEUROMUSCULOSKELETAL MEDICINE & OMM

## 2024-03-05 PROCEDURE — 3079F DIAST BP 80-89 MM HG: CPT | Performed by: NEUROMUSCULOSKELETAL MEDICINE & OMM

## 2024-03-05 PROCEDURE — G8399 PT W/DXA RESULTS DOCUMENT: HCPCS | Performed by: NEUROMUSCULOSKELETAL MEDICINE & OMM

## 2024-03-05 PROCEDURE — G8427 DOCREV CUR MEDS BY ELIG CLIN: HCPCS | Performed by: NEUROMUSCULOSKELETAL MEDICINE & OMM

## 2024-03-05 PROCEDURE — 1036F TOBACCO NON-USER: CPT | Performed by: NEUROMUSCULOSKELETAL MEDICINE & OMM

## 2024-03-05 ASSESSMENT — PATIENT HEALTH QUESTIONNAIRE - PHQ9
SUM OF ALL RESPONSES TO PHQ QUESTIONS 1-9: 0
SUM OF ALL RESPONSES TO PHQ QUESTIONS 1-9: 0
10. IF YOU CHECKED OFF ANY PROBLEMS, HOW DIFFICULT HAVE THESE PROBLEMS MADE IT FOR YOU TO DO YOUR WORK, TAKE CARE OF THINGS AT HOME, OR GET ALONG WITH OTHER PEOPLE: 0
4. FEELING TIRED OR HAVING LITTLE ENERGY: 0
SUM OF ALL RESPONSES TO PHQ QUESTIONS 1-9: 0
7. TROUBLE CONCENTRATING ON THINGS, SUCH AS READING THE NEWSPAPER OR WATCHING TELEVISION: 0
SUM OF ALL RESPONSES TO PHQ QUESTIONS 1-9: 0
1. LITTLE INTEREST OR PLEASURE IN DOING THINGS: 0
3. TROUBLE FALLING OR STAYING ASLEEP: 0
6. FEELING BAD ABOUT YOURSELF - OR THAT YOU ARE A FAILURE OR HAVE LET YOURSELF OR YOUR FAMILY DOWN: 0
9. THOUGHTS THAT YOU WOULD BE BETTER OFF DEAD, OR OF HURTING YOURSELF: 0
2. FEELING DOWN, DEPRESSED OR HOPELESS: 0
SUM OF ALL RESPONSES TO PHQ9 QUESTIONS 1 & 2: 0
5. POOR APPETITE OR OVEREATING: 0
8. MOVING OR SPEAKING SO SLOWLY THAT OTHER PEOPLE COULD HAVE NOTICED. OR THE OPPOSITE, BEING SO FIGETY OR RESTLESS THAT YOU HAVE BEEN MOVING AROUND A LOT MORE THAN USUAL: 0

## 2024-03-05 ASSESSMENT — ENCOUNTER SYMPTOMS
ABDOMINAL PAIN: 0
BLOOD IN STOOL: 0
NAUSEA: 0
VOMITING: 0
WHEEZING: 0
CONSTIPATION: 0
DIARRHEA: 0
CHOKING: 0
COUGH: 0
ABDOMINAL DISTENTION: 0
CHEST TIGHTNESS: 0
SHORTNESS OF BREATH: 0

## 2024-03-05 NOTE — PROGRESS NOTES
[place] within the next 1 to 2 weeks.  11. Kidney transplanted  Comments:  Done at  in 2015, in October30/31, 2015, right sided.  12. Mixed hyperlipidemia  Comments:  remains on Zocor 40mg once a day.  Orders:  -     Lipid Panel; Future  13. Pre-diabetes  Comments:  A1C done in office today showing 6.2. Will give low carb diet today.      Return in about 3 months (around 6/5/2024) for to monitor medical conditions.         Subjective   SUBJECTIVE/OBJECTIVE:  HPI 68-year-old female here for follow-up on her hypertension, 3-month checkup.  Patient recently lost her mother within the last week.  Blood pressure at today's visit optimal control with reading of 134/84, continue same antihypertensive regimen.  She been seen and followed by nephrology locally every 3 to 4 months.  She also sees cardiology, Dr. Nash every 6 months.    Review of Systems   Constitutional:  Negative for activity change, appetite change, chills, diaphoresis, fatigue and fever.   Respiratory:  Negative for cough, choking, chest tightness, shortness of breath and wheezing.    Cardiovascular:  Negative for chest pain, palpitations and leg swelling.   Gastrointestinal:  Negative for abdominal distention, abdominal pain, blood in stool, constipation, diarrhea, nausea and vomiting.   Genitourinary:  Negative for difficulty urinating.   Skin:  Negative for rash and wound.   Allergic/Immunologic: Negative for environmental allergies, food allergies and immunocompromised state.   Neurological:  Negative for dizziness, weakness, light-headedness and headaches.   Psychiatric/Behavioral:  Positive for dysphoric mood. Negative for agitation, behavioral problems, confusion, self-injury, sleep disturbance and suicidal ideas. The patient is nervous/anxious.           Objective   /84   Pulse 98   Temp 97.9 °F (36.6 °C) (Temporal)   Ht 1.575 m (5' 2\")   Wt 96.2 kg (212 lb)   SpO2 94%   BMI 38.78 kg/m²   Current Outpatient Medications

## 2024-03-06 LAB — CYCLOSPORINE BLD-MCNC: 99 UG/L (ref 50–300)

## 2024-03-20 ENCOUNTER — APPOINTMENT (OUTPATIENT)
Dept: OTHER | Age: 69
End: 2024-03-20
Payer: MEDICARE

## 2024-03-31 ENCOUNTER — HOSPITAL ENCOUNTER (OUTPATIENT)
Age: 69
Discharge: HOME | End: 2024-03-31

## 2024-04-01 ENCOUNTER — HOSPITAL ENCOUNTER (OUTPATIENT)
Age: 69
Discharge: HOME | End: 2024-04-01
Payer: MEDICARE

## 2024-04-01 DIAGNOSIS — E78.2 MIXED HYPERLIPIDEMIA: ICD-10-CM

## 2024-04-01 LAB
25(OH)D3 SERPL-MCNC: 34.7 NG/ML (ref 30–100)
ANION GAP SERPL CALCULATED.3IONS-SCNC: 9 MMOL/L (ref 7–16)
BASOPHILS # BLD: 0 K/UL (ref 0–0.2)
BASOPHILS NFR BLD: 0 % (ref 0–2)
BUN SERPL-MCNC: 28 MG/DL (ref 6–23)
CALCIUM SERPL-MCNC: 10.1 MG/DL (ref 8.6–10.2)
CHLORIDE SERPL-SCNC: 102 MMOL/L (ref 98–107)
CHOLEST SERPL-MCNC: 177 MG/DL
CO2 SERPL-SCNC: 28 MMOL/L (ref 22–29)
CREAT SERPL-MCNC: 1.6 MG/DL (ref 0.5–1)
EOSINOPHIL # BLD: 0.09 K/UL (ref 0.05–0.5)
EOSINOPHILS RELATIVE PERCENT: 1 % (ref 0–6)
ERYTHROCYTE [DISTWIDTH] IN BLOOD BY AUTOMATED COUNT: 14.8 % (ref 11.5–15)
GFR SERPL CREATININE-BSD FRML MDRD: 34 ML/MIN/1.73M2
GLUCOSE SERPL-MCNC: 136 MG/DL (ref 74–99)
HCT VFR BLD AUTO: 40.8 % (ref 34–48)
HDLC SERPL-MCNC: 48 MG/DL
HGB BLD-MCNC: 12.8 G/DL (ref 11.5–15.5)
LDLC SERPL CALC-MCNC: 99 MG/DL
LYMPHOCYTES NFR BLD: 0.34 K/UL (ref 1.5–4)
LYMPHOCYTES RELATIVE PERCENT: 4 % (ref 20–42)
MCH RBC QN AUTO: 34.8 PG (ref 26–35)
MCHC RBC AUTO-ENTMCNC: 31.4 G/DL (ref 32–34.5)
MCV RBC AUTO: 110.9 FL (ref 80–99.9)
MONOCYTES NFR BLD: 0.6 K/UL (ref 0.1–0.95)
MONOCYTES NFR BLD: 6 % (ref 2–12)
MYELOCYTES ABSOLUTE COUNT: 0.09 K/UL
MYELOCYTES: 1 %
NEUTROPHILS NFR BLD: 89 % (ref 43–80)
NEUTS SEG NFR BLD: 8.78 K/UL (ref 1.8–7.3)
PLATELET # BLD AUTO: 164 K/UL (ref 130–450)
PMV BLD AUTO: 12.9 FL (ref 7–12)
POTASSIUM SERPL-SCNC: 4.3 MMOL/L (ref 3.5–5)
PTH-INTACT SERPL-MCNC: 125.4 PG/ML (ref 15–65)
RBC # BLD AUTO: 3.68 M/UL (ref 3.5–5.5)
SODIUM SERPL-SCNC: 139 MMOL/L (ref 132–146)
TRIGL SERPL-MCNC: 150 MG/DL
VLDLC SERPL CALC-MCNC: 30 MG/DL
WBC OTHER # BLD: 9.9 K/UL (ref 4.5–11.5)

## 2024-04-01 PROCEDURE — 36415 COLL VENOUS BLD VENIPUNCTURE: CPT

## 2024-04-01 PROCEDURE — 80061 LIPID PANEL: CPT

## 2024-04-01 PROCEDURE — 83970 ASSAY OF PARATHORMONE: CPT

## 2024-04-01 PROCEDURE — 82306 VITAMIN D 25 HYDROXY: CPT

## 2024-04-01 PROCEDURE — 85025 COMPLETE CBC W/AUTO DIFF WBC: CPT

## 2024-04-01 PROCEDURE — 80048 BASIC METABOLIC PNL TOTAL CA: CPT

## 2024-04-03 ENCOUNTER — HOSPITAL ENCOUNTER (OUTPATIENT)
Dept: OTHER | Age: 69
Setting detail: THERAPIES SERIES
Discharge: HOME | End: 2024-04-03
Payer: MEDICARE

## 2024-04-03 VITALS
HEART RATE: 91 BPM | WEIGHT: 211 LBS | DIASTOLIC BLOOD PRESSURE: 77 MMHG | BODY MASS INDEX: 38.59 KG/M2 | OXYGEN SATURATION: 92 % | SYSTOLIC BLOOD PRESSURE: 121 MMHG | RESPIRATION RATE: 18 BRPM

## 2024-04-03 LAB — BNP SERPL-MCNC: 1614 PG/ML (ref 0–450)

## 2024-04-03 PROCEDURE — 99214 OFFICE O/P EST MOD 30 MIN: CPT

## 2024-04-03 PROCEDURE — 83880 ASSAY OF NATRIURETIC PEPTIDE: CPT

## 2024-04-03 PROCEDURE — 36415 COLL VENOUS BLD VENIPUNCTURE: CPT

## 2024-04-03 NOTE — PROGRESS NOTES
(mmol/L)   Date Value   04/01/2024 102   03/01/2024 99   02/20/2024 97 (L)     CO2 (mmol/L)   Date Value   04/01/2024 28   03/01/2024 25   02/20/2024 26     BUN (mg/dL)   Date Value   04/01/2024 28 (H)   03/01/2024 30 (H)   02/20/2024 27 (H)     Creatinine (mg/dL)   Date Value   04/01/2024 1.6 (H)   03/01/2024 2.0 (H)   02/20/2024 1.8 (H)     Glucose (mg/dL)   Date Value   04/01/2024 136 (H)   03/01/2024 135 (H)   02/20/2024 222 (H)     Calcium (mg/dL)   Date Value   04/01/2024 10.1   03/01/2024 10.3 (H)   02/20/2024 9.9     BNP:  Pro-BNP (pg/mL)   Date Value   04/03/2024 1,614 (H)   12/13/2023 1,024 (H)   11/08/2023 1,170 (H)      CBC:  WBC (k/uL)   Date Value   04/01/2024 9.9     Hemoglobin (g/dL)   Date Value   04/01/2024 12.8     Hematocrit (%)   Date Value   04/01/2024 40.8     Platelets (k/uL)   Date Value   04/01/2024 164     Iron Studies:  Ferritin (ng/mL)   Date Value   02/20/2024 350     Iron (ug/dL)   Date Value   02/20/2024 62     TIBC (ug/dL)   Date Value   02/20/2024 273     Hepatic:  AST (U/L)   Date Value   02/20/2024 12     ALT (U/L)   Date Value   02/20/2024 10     Total Bilirubin (mg/dL)   Date Value   02/20/2024 0.3     Alkaline Phosphatase (U/L)   Date Value   02/20/2024 74     INR:  INR (no units)   Date Value   05/18/2022 1.1         Wt Readings from Last 3 Encounters:   04/03/24 95.7 kg (211 lb)   03/05/24 96.2 kg (212 lb)   12/13/23 96.3 kg (212 lb 6.4 oz)       ASSESSMENT/PLAN:    [x] Euvolemic- decrease in edema, stable weights, denies SOB/PUGA. Lungs clear.           [] Hypervolemic, with increase from baseline:  [] Shortness of breath/PUGA  [] JVD  [] HJR  [] Abnormal lung assessment:   [] Orthopnea  [] PND  [] Decreased urinary response to oral diuretic   [] Scrotal swelling   [] Lower extremity edema  [] Compression stockings provided  [] Decline in functional capacity (unable to perform activities they had previously been able to do)  [] Weight gain     [] IV diuretics given No  []

## 2024-04-10 ENCOUNTER — LAB (OUTPATIENT)
Dept: LAB | Facility: LAB | Age: 69
End: 2024-04-10
Payer: MEDICARE

## 2024-04-10 ENCOUNTER — TELEPHONE (OUTPATIENT)
Dept: TRANSPLANT | Facility: HOSPITAL | Age: 69
End: 2024-04-10

## 2024-04-10 ENCOUNTER — OFFICE VISIT (OUTPATIENT)
Dept: TRANSPLANT | Facility: CLINIC | Age: 69
End: 2024-04-10
Payer: MEDICARE

## 2024-04-10 VITALS
DIASTOLIC BLOOD PRESSURE: 83 MMHG | HEART RATE: 91 BPM | BODY MASS INDEX: 36.1 KG/M2 | WEIGHT: 210.3 LBS | SYSTOLIC BLOOD PRESSURE: 149 MMHG | OXYGEN SATURATION: 95 % | TEMPERATURE: 97.1 F

## 2024-04-10 DIAGNOSIS — M54.41 ACUTE BILATERAL LOW BACK PAIN WITH BILATERAL SCIATICA: ICD-10-CM

## 2024-04-10 DIAGNOSIS — Z94.0 IMMUNOSUPPRESSIVE MANAGEMENT ENCOUNTER FOLLOWING KIDNEY TRANSPLANT (HHS-HCC): ICD-10-CM

## 2024-04-10 DIAGNOSIS — Z79.899 IMMUNOSUPPRESSIVE MANAGEMENT ENCOUNTER FOLLOWING KIDNEY TRANSPLANT (HHS-HCC): ICD-10-CM

## 2024-04-10 DIAGNOSIS — Z94.0 KIDNEY REPLACED BY TRANSPLANT (HHS-HCC): Primary | ICD-10-CM

## 2024-04-10 DIAGNOSIS — N10 ACUTE PYELONEPHRITIS: ICD-10-CM

## 2024-04-10 DIAGNOSIS — I10 ESSENTIAL HYPERTENSION: ICD-10-CM

## 2024-04-10 DIAGNOSIS — M54.42 ACUTE BILATERAL LOW BACK PAIN WITH BILATERAL SCIATICA: ICD-10-CM

## 2024-04-10 LAB
APPEARANCE UR: ABNORMAL
BACTERIA #/AREA URNS AUTO: ABNORMAL /HPF
BILIRUB UR STRIP.AUTO-MCNC: NEGATIVE MG/DL
COLOR UR: YELLOW
GLUCOSE UR STRIP.AUTO-MCNC: NEGATIVE MG/DL
HOLD SPECIMEN: NORMAL
HYALINE CASTS #/AREA URNS AUTO: ABNORMAL /LPF
KETONES UR STRIP.AUTO-MCNC: NEGATIVE MG/DL
LEUKOCYTE ESTERASE UR QL STRIP.AUTO: ABNORMAL
MUCOUS THREADS #/AREA URNS AUTO: ABNORMAL /LPF
NITRITE UR QL STRIP.AUTO: NEGATIVE
PH UR STRIP.AUTO: 5 [PH]
PROT UR STRIP.AUTO-MCNC: ABNORMAL MG/DL
RBC # UR STRIP.AUTO: ABNORMAL /UL
RBC #/AREA URNS AUTO: ABNORMAL /HPF
SP GR UR STRIP.AUTO: 1.02
SQUAMOUS #/AREA URNS AUTO: ABNORMAL /HPF
UROBILINOGEN UR STRIP.AUTO-MCNC: <2 MG/DL
WBC #/AREA URNS AUTO: >50 /HPF
WBC CLUMPS #/AREA URNS AUTO: ABNORMAL /HPF

## 2024-04-10 PROCEDURE — 99214 OFFICE O/P EST MOD 30 MIN: CPT

## 2024-04-10 PROCEDURE — 87086 URINE CULTURE/COLONY COUNT: CPT

## 2024-04-10 PROCEDURE — 1126F AMNT PAIN NOTED NONE PRSNT: CPT

## 2024-04-10 PROCEDURE — 3079F DIAST BP 80-89 MM HG: CPT

## 2024-04-10 PROCEDURE — 3077F SYST BP >= 140 MM HG: CPT

## 2024-04-10 PROCEDURE — 81001 URINALYSIS AUTO W/SCOPE: CPT

## 2024-04-10 PROCEDURE — 87186 SC STD MICRODIL/AGAR DIL: CPT

## 2024-04-10 RX ORDER — OXYCODONE HYDROCHLORIDE 5 MG/1
5 TABLET ORAL EVERY 6 HOURS PRN
Qty: 30 TABLET | Refills: 0 | Status: SHIPPED | OUTPATIENT
Start: 2024-04-10 | End: 2024-04-20

## 2024-04-10 RX ORDER — OXYCODONE HYDROCHLORIDE 5 MG/1
5 TABLET ORAL EVERY 6 HOURS PRN
Qty: 28 TABLET | Refills: 0 | Status: CANCELLED | OUTPATIENT
Start: 2024-04-10 | End: 2024-04-17

## 2024-04-10 RX ORDER — OXYCODONE HYDROCHLORIDE 5 MG/1
5 TABLET ORAL EVERY 6 HOURS PRN
Qty: 30 TABLET | Refills: 0 | Status: CANCELLED | OUTPATIENT
Start: 2024-04-10 | End: 2024-04-20

## 2024-04-10 ASSESSMENT — PAIN SCALES - GENERAL: PAINLEVEL: 0-NO PAIN

## 2024-04-10 NOTE — PATIENT INSTRUCTIONS
Please take tylenol as needed. You may try extra strength tylenol 2 tabs (1000 mg) twice a day or three times a day  Icy hot lidocaine patch  OK to use Votaren gel over the counter  For severe pain, can take oxycodone. Be careful about fall.   Blood test every 3 months  Next follow up in a year

## 2024-04-10 NOTE — PROGRESS NOTES
TRANSPLANT NEPHROLOGY :   OUTPATIENT CLINIC NOTE      SERVICE DATE : 04/10/2024    REASON FOR VISIT/CHIEF COMPLAINT:  S/P  TRANSPLANT SURGERY  IMMUNOSUPPRESSIVE MEDICATION MANAGEMENT  BLOOD PRESSURE MANAGEMENT    HPI:    Ms. Jarad Paulino is a 69 y.o. female with past medical history significant for  hypertension and End Stage Renal Disease secondary to lithium toxicity and hypertensive nephrosclerosis. Patient underwent a  donor renal transplant on 10/30/15. KDPI 35-85%.      Today, the patient presents for a post-kidney transplant follow up visit.    Kristen has acute back pain that radiated down her leg. She will see PCP for further work up.     Denied chest pain, SOB, RIVAS, Palpitation. Normal urination and bowel movement. Normal gait and no weakness of arms/legs. No cough, runny nose, sore throat, cold symptoms, or rash. No hearing loss. Normal vision.No problems with his sleep, mood and function. No recent infection, hospitalization, surgery or ER visits.      ROS:  Review of  14 systems was performed system by system. See HPI. Otherwise, the symptoms were negative.    PAST MEDICAL HISTORY:  Past Medical History:   Diagnosis Date    Asymptomatic human immunodeficiency virus (hiv) infection status (CMS/HCC) 2016    HIV, asymptomatic    Kidney transplant status     Status post kidney transplant    Personal history of other infectious and parasitic diseases 2016    History of hepatitis B virus infection    Personal history of other infectious and parasitic diseases 2016    History of hepatitis C virus infection    Postprocedural hypoparathyroidism (CMS/[facility])     Status post subtotal parathyroidectomy    Postprocedural hypothyroidism     Status post partial thyroidectomy    Urinary tract infection, site not specified 2016    Acute UTI        PAST SURGICAL HISTORY:  Past Surgical History:   Procedure Laterality Date    OTHER SURGICAL HISTORY  2016    Renal Transplant         SOCIAL HISTORY:  Social History     Socioeconomic History    Marital status:      Spouse name: Not on file    Number of children: Not on file    Years of education: Not on file    Highest education level: Not on file   Occupational History    Not on file   Tobacco Use    Smoking status: Not on file    Smokeless tobacco: Not on file   Substance and Sexual Activity    Alcohol use: Not on file    Drug use: Not on file    Sexual activity: Not on file   Other Topics Concern    Not on file   Social History Narrative    Not on file     Social Determinants of Health     Financial Resource Strain: Low Risk  (10/25/2023)    Received from [facility]    Overall Financial Resource Strain (CARDIA)     Difficulty of Paying Living Expenses: Not hard at all   Food Insecurity: No Food Insecurity (10/25/2023)    Received from [facility]    Hunger Vital Sign     Worried About Running Out of Food in the Last Year: Never true     Ran Out of Food in the Last Year: Never true   Transportation Needs: No Transportation Needs (10/25/2023)    Received from [facility]    PRAPARE - Transportation     Lack of Transportation (Medical): No     Lack of Transportation (Non-Medical): No   Physical Activity: Inactive (10/25/2023)    Received from [facility]    Exercise Vital Sign     Days of Exercise per Week: 0 days     Minutes of Exercise per Session: 0 min   Stress: No Stress Concern Present (10/25/2023)    Received from [facility]    [place] citizen [place]ational [employer] - Occupational Stress Questionnaire     Feeling of Stress : Only a little   Social Connections: Moderately Isolated (10/25/2023)    Received from [facility]    Social Connection and Isolation Panel [NHANES]     Frequency of Communication with Friends and Family: Three times a week     Frequency of Social Gatherings with Friends and  Family: Twice a week     Attends Rastafari Services: 1 to 4 times per year     Active Member of Clubs or Organizations: No     Attends Club or Organization Meetings: Never     Marital Status:    Intimate Partner Violence: Not on file   Housing Stability: Low Risk  (10/25/2023)    [facility]    Housing Stability Vital Sign     Unable to Pay for Housing in the Last Year: No     Number of Places Lived in the Last Year: 1     Unstable Housing in the Last Year: No       FAMILY HISTORY:  No family history on file.    MEDICATION LIST:  Current Outpatient Medications   Medication Instructions    oxyCODONE (ROXICODONE) 5 mg, oral, Every 6 hours PRN       ALLERGY  Allergies   Allergen Reactions    Macrodantin [Nitrofurantoin Macrocrystal] GI Upset    Sulfa (Sulfonamide Antibiotics) Rash       PHYSICAL EXAM:    Visit Vitals  /83   Pulse 91   Temp 36.2 °C (97.1 °F) (Temporal)   Wt 95.4 kg (210 lb 4.8 oz)   SpO2 95%   BMI 36.10 kg/m²   BSA 2.08 m²          Vital signs - reviewed. Acceptable BP at this office visit.   General Appearance - NAD, Good speech, oriented and alert  HEENT - Supple. Not pale. No jaundice. No cervical lymphadenopathy. Pharynx and tonsils are not injected.  CVS - RRR. Normal S1/S2. No murmur, click , rub or gallop  Lungs- clear to auscultation bilaterally  Abdomen - soft , not tender, no guarding, no rigidity. No hepatosplenomegaly. Normal bowel sounds. No masses and ascites. S/P Kidney transplant .  Transplanted kidney is not tender.   Musculoskeletal /Extremities - no edema. Full ROM. No joint tenderness.   Neuro/Psych - appropriate mood and affect. Motor power V/V all extremities. CN I -XII were grossly intact.  Skin - No visible rash      LABS:    Lab Results   Component Value Date    WBC 9.8 02/08/2022    HGB 11.9 09/05/2023    HCT 38.1 09/05/2023     09/05/2023    CHOL 198 06/21/2021    TRIG 171 (H) 06/21/2021    HDL 52.2 06/21/2021    ALT 41  01/31/2022    AST 19 01/31/2022     09/11/2023    K 4.6 09/11/2023     09/11/2023    CREATININE 1.50 09/11/2023    BUN 32 09/11/2023    CO2 24 09/11/2023    TSH 2.02 01/26/2022    INR 1.0 01/25/2022    HGBA1C 5.6 12/27/2022     par    ASSESSMENT AND PLAN:    Ms. Jarad Paulino is a 69 y.o. female  who is here for follow up s/p kidney transplant.    TRANSPLANT DATE: 10/30/2015 (Kidney)      1. ESRD S/P kidney transplant   - Creatinine last check was :  Lab Results   Component Value Date    CREATININE 1.50 09/11/2023     Creatinine clearance cannot be calculated (Patient's most recent lab result is older than the maximum 7 days allowed.)    - Renal allograft function is stable.   -Random urine protein/creatinine ratio is normal  -Ensure adequate hydration  - Avoid nephrotoxic medications, NSAIDs, and IV contrast.    2. Immunosuppression  -Tacrolimus level last check was at goal  -Continue current immunosuppression regimen.    3. Electrolytes  Lab Results   Component Value Date    GLUCOSE 127 09/11/2023    CALCIUM 10.3 09/11/2023     09/11/2023    K 4.6 09/11/2023    CO2 24 09/11/2023     09/11/2023    BUN 32 09/11/2023    CREATININE 1.50 09/11/2023     -Acceptable from last lab drawn    4. Hypertension  Blood Pressures         4/10/2024  1055             BP: 149/83          -Home  BP had been acceptable  -Encourage to monitor home BP  -Continue current anti hypertensive medication    5. Bone Mineral Disease/Osteoporosis  Lab Results   Component Value Date    PTH 97.8 (H) 06/21/2021    CALCIUM 10.3 09/11/2023    CAION 1.30 02/02/2022    PHOS 3.8 02/09/2022    VITD25 52 06/21/2021     -check VIT D, PTH with next lab  - Consider DEXA every 2-3 years , defer to PCP    6.Anemia  Lab Results   Component Value Date    WBC 9.8 02/08/2022    HGB 11.9 09/05/2023    HCT 38.1 09/05/2023     (H) 02/08/2022     09/05/2023     -asymptomatic  - Continue to monitor  -check iron studies and ferritin.  Will consider AALIYAH as needed.  - No indications for blood transfusion     7.Health maintenance and vaccination  - Flu shot during flu season annually  - Cancer screening is up to date per the patient    Lab : Routine transplant lab ( CBC, RFP, and anti-rejection trough level ) every 3 months  Additional labs:  VIT D, PTH with next lab  Viral screening PCR, Allosure and UPC per protocol.    Additional Plan :  Back pain -     Oxycodone prn x one time. Further script will need to follow up with PCP.  Please take tylenol as needed. You may try extra strength tylenol 2 tabs (1000 mg) twice a day or three times a day  Icy hot lidocaine patch  OK to use Votaren gel over the counter  For severe pain, can take oxycodone. Be careful about fall.   Consent for opioid was signed in clinic today.   Blood test every 3 months  Next follow up in a year    RTC 12 month(s); seeing Dr. Constantino in between    [facility]    Trans[facility] Nephrology

## 2024-04-13 LAB — BACTERIA UR CULT: ABNORMAL

## 2024-04-15 ENCOUNTER — TELEPHONE (OUTPATIENT)
Dept: TRANSPLANT | Facility: HOSPITAL | Age: 69
End: 2024-04-15
Payer: COMMERCIAL

## 2024-04-15 DIAGNOSIS — Z94.0 KIDNEY REPLACED BY TRANSPLANT (HHS-HCC): ICD-10-CM

## 2024-04-15 DIAGNOSIS — N39.0 URINARY TRACT INFECTION WITHOUT HEMATURIA, SITE UNSPECIFIED: ICD-10-CM

## 2024-04-15 NOTE — TELEPHONE ENCOUNTER
Urine results reviewed with Dr. Robb and primary coordinator   UTI +  Culture +   Sensitive to Cipro    PLAN:  Start cipro 500 mg BID for 7 days  Called and spoke with patient, she recently finished a round of cephalexine for 7 days prescribed by urgent care, per Dr. Robb proceed with Cipro.     Script sent to local [facility]

## 2024-04-16 ENCOUNTER — TELEPHONE (OUTPATIENT)
Dept: TRANSPLANT | Facility: HOSPITAL | Age: 69
End: 2024-04-16
Payer: COMMERCIAL

## 2024-04-16 LAB — NONINV COLON CA DNA+OCC BLD SCRN STL QL: NEGATIVE

## 2024-04-16 RX ORDER — CIPROFLOXACIN 500 MG/1
500 TABLET ORAL 2 TIMES DAILY
Qty: 14 TABLET | Refills: 0 | Status: SHIPPED | OUTPATIENT
Start: 2024-04-16 | End: 2024-04-17 | Stop reason: WASHOUT

## 2024-04-17 ENCOUNTER — TELEPHONE (OUTPATIENT)
Dept: TRANSPLANT | Facility: HOSPITAL | Age: 69
End: 2024-04-17
Payer: COMMERCIAL

## 2024-04-17 DIAGNOSIS — N39.0 URINARY TRACT INFECTION WITHOUT HEMATURIA, SITE UNSPECIFIED: ICD-10-CM

## 2024-04-17 DIAGNOSIS — Z94.0 KIDNEY REPLACED BY TRANSPLANT (HHS-HCC): ICD-10-CM

## 2024-04-17 NOTE — TELEPHONE ENCOUNTER
Called and discussed with patient that the change in antibiotic for her UTI.  Med ordered.  Awaiting Dr. Robb approval.  Pt with no further questions.

## 2024-04-17 NOTE — TELEPHONE ENCOUNTER
Called and spoke with [facility] pharmacist regarding drug interaction for Cipro that was ordered for UTI and pt taking clozapine.    Will discuss with Dr. Robb.

## 2024-04-18 RX ORDER — CEFDINIR 300 MG/1
300 CAPSULE ORAL 2 TIMES DAILY
Qty: 14 CAPSULE | Refills: 0 | Status: SHIPPED | OUTPATIENT
Start: 2024-04-18 | End: 2024-04-25

## 2024-04-25 RX ORDER — FOLIC ACID 1 MG/1
1 TABLET ORAL DAILY
Qty: 90 TABLET | Refills: 1 | Status: SHIPPED | OUTPATIENT
Start: 2024-04-25

## 2024-05-02 RX ORDER — PREDNISONE 5 MG/1
5 TABLET ORAL DAILY
Qty: 90 TABLET | Refills: 1 | Status: SHIPPED | OUTPATIENT
Start: 2024-05-02

## 2024-05-08 ENCOUNTER — HOSPITAL ENCOUNTER (OUTPATIENT)
Age: 69
Discharge: HOME | End: 2024-05-08
Payer: MEDICARE

## 2024-05-08 DIAGNOSIS — A49.8 PSEUDOMONAS AERUGINOSA INFECTION: ICD-10-CM

## 2024-05-08 DIAGNOSIS — N39.0 RECURRENT UTI (URINARY TRACT INFECTION): ICD-10-CM

## 2024-05-08 LAB
BACTERIA URNS QL MICRO: ABNORMAL
BASOPHILS # BLD: 0.04 K/UL (ref 0–0.2)
BASOPHILS NFR BLD: 0 % (ref 0–2)
BILIRUB UR QL STRIP: NEGATIVE
CLARITY UR: CLEAR
COLOR UR: YELLOW
EOSINOPHIL # BLD: 0.08 K/UL (ref 0.05–0.5)
EOSINOPHILS RELATIVE PERCENT: 1 % (ref 0–6)
EPI CELLS #/AREA URNS HPF: ABNORMAL /HPF
ERYTHROCYTE [DISTWIDTH] IN BLOOD BY AUTOMATED COUNT: 15.4 % (ref 11.5–15)
GLUCOSE UR STRIP-MCNC: NEGATIVE MG/DL
HCT VFR BLD AUTO: 43.9 % (ref 34–48)
HGB BLD-MCNC: 14.3 G/DL (ref 11.5–15.5)
HGB UR QL STRIP.AUTO: ABNORMAL
IMM GRANULOCYTES # BLD AUTO: 0.09 K/UL (ref 0–0.58)
IMM GRANULOCYTES NFR BLD: 1 % (ref 0–5)
KETONES UR STRIP-MCNC: ABNORMAL MG/DL
LEUKOCYTE ESTERASE UR QL STRIP: ABNORMAL
LYMPHOCYTES NFR BLD: 0.75 K/UL (ref 1.5–4)
LYMPHOCYTES RELATIVE PERCENT: 6 % (ref 20–42)
MCH RBC QN AUTO: 35.8 PG (ref 26–35)
MCHC RBC AUTO-ENTMCNC: 32.6 G/DL (ref 32–34.5)
MCV RBC AUTO: 110 FL (ref 80–99.9)
MONOCYTES NFR BLD: 0.94 K/UL (ref 0.1–0.95)
MONOCYTES NFR BLD: 8 % (ref 2–12)
NEUTROPHILS NFR BLD: 84 % (ref 43–80)
NEUTS SEG NFR BLD: 9.89 K/UL (ref 1.8–7.3)
NITRITE UR QL STRIP: NEGATIVE
PH UR STRIP: 5.5 [PH] (ref 5–9)
PLATELET # BLD AUTO: 161 K/UL (ref 130–450)
PMV BLD AUTO: 12.3 FL (ref 7–12)
PROT UR STRIP-MCNC: ABNORMAL MG/DL
RBC # BLD AUTO: 3.99 M/UL (ref 3.5–5.5)
RBC #/AREA URNS HPF: ABNORMAL /HPF
SP GR UR STRIP: 1.02 (ref 1–1.03)
UROBILINOGEN UR STRIP-ACNC: 0.2 EU/DL (ref 0–1)
WBC #/AREA URNS HPF: ABNORMAL /HPF
WBC OTHER # BLD: 11.8 K/UL (ref 4.5–11.5)

## 2024-05-08 PROCEDURE — 85025 COMPLETE CBC W/AUTO DIFF WBC: CPT

## 2024-05-08 PROCEDURE — 36415 COLL VENOUS BLD VENIPUNCTURE: CPT

## 2024-05-08 PROCEDURE — 87086 URINE CULTURE/COLONY COUNT: CPT

## 2024-05-08 PROCEDURE — 81001 URINALYSIS AUTO W/SCOPE: CPT

## 2024-05-10 LAB
MICROORGANISM SPEC CULT: ABNORMAL
SPECIMEN DESCRIPTION: ABNORMAL

## 2024-05-15 ENCOUNTER — CLINICAL DOCUMENTATION (OUTPATIENT)
Dept: INFECTIOUS DISEASES | Age: 69
End: 2024-05-15

## 2024-05-15 NOTE — PROGRESS NOTES
Cecille    Called pt on 5/14   Discussed urine cx     Results       Procedure Component Value Units Date/Time    Culture, Urine [[id number]]  (Abnormal) Collected: 05/08/24 1040    Order Status: Completed Specimen: Urine, clean catch Updated: 05/13/24 0731     Specimen Description .CLEAN CATCH URINE     Culture Mixed rubio isolated. Further workup and sensitivity testing not routinely indicated and will not be perfomed. Mixed rubio isolated includes: MIXED GRAM NEGATIVE RODS        '  Pt has no uti sx at this time  Follow clinically   To call if pt has symptoms  Pushpa Wright MD

## 2024-05-16 ENCOUNTER — TELEPHONE (OUTPATIENT)
Dept: TRANSPLANT | Facility: HOSPITAL | Age: 69
End: 2024-05-16
Payer: COMMERCIAL

## 2024-05-16 DIAGNOSIS — Z94.0 KIDNEY REPLACED BY TRANSPLANT (HHS-HCC): ICD-10-CM

## 2024-06-01 ENCOUNTER — HOSPITAL ENCOUNTER (OUTPATIENT)
Age: 69
Discharge: HOME | End: 2024-06-01
Payer: MEDICARE

## 2024-06-01 ENCOUNTER — HOSPITAL ENCOUNTER (INPATIENT)
Age: 69
LOS: 2 days | Discharge: ANOTHER HOSPITAL | DRG: 438 | End: 2024-06-03
Attending: EMERGENCY MEDICINE | Admitting: FAMILY MEDICINE
Payer: MEDICARE

## 2024-06-01 ENCOUNTER — APPOINTMENT (OUTPATIENT)
Dept: CT IMAGING | Age: 69
DRG: 438 | End: 2024-06-01
Payer: MEDICARE

## 2024-06-01 DIAGNOSIS — I44.7 LBBB (LEFT BUNDLE BRANCH BLOCK): ICD-10-CM

## 2024-06-01 DIAGNOSIS — I24.9 ACUTE CORONARY SYNDROME (HCC): ICD-10-CM

## 2024-06-01 DIAGNOSIS — E83.52 HYPERCALCEMIA: Primary | ICD-10-CM

## 2024-06-01 DIAGNOSIS — N20.1 CALCULUS OF URETER: ICD-10-CM

## 2024-06-01 DIAGNOSIS — K85.90 ACUTE PANCREATITIS WITHOUT INFECTION OR NECROSIS, UNSPECIFIED PANCREATITIS TYPE: ICD-10-CM

## 2024-06-01 DIAGNOSIS — I48.0 PAROXYSMAL ATRIAL FIBRILLATION (HCC): ICD-10-CM

## 2024-06-01 DIAGNOSIS — N18.9 ACUTE RENAL FAILURE SUPERIMPOSED ON CHRONIC KIDNEY DISEASE, UNSPECIFIED ACUTE RENAL FAILURE TYPE, UNSPECIFIED CKD STAGE (HCC): ICD-10-CM

## 2024-06-01 DIAGNOSIS — Z94.0 HISTORY OF RENAL TRANSPLANT: ICD-10-CM

## 2024-06-01 DIAGNOSIS — N17.9 ACUTE RENAL FAILURE SUPERIMPOSED ON CHRONIC KIDNEY DISEASE, UNSPECIFIED ACUTE RENAL FAILURE TYPE, UNSPECIFIED CKD STAGE (HCC): ICD-10-CM

## 2024-06-01 PROBLEM — K85.30 DRUG-INDUCED ACUTE PANCREATITIS WITHOUT INFECTION OR NECROSIS: Status: ACTIVE | Noted: 2024-06-01

## 2024-06-01 LAB
25(OH)D3 SERPL-MCNC: 45.2 NG/ML (ref 30–100)
ALBUMIN SERPL-MCNC: 3.7 G/DL (ref 3.5–5.2)
ALBUMIN: 3.8 G/DL (ref 3.5–5.2)
ALP SERPL-CCNC: 70 U/L (ref 35–104)
ALT SERPL-CCNC: 9 U/L (ref 0–32)
ANION GAP SERPL CALCULATED.3IONS-SCNC: 13 MMOL/L (ref 7–16)
ANION GAP SERPL CALCULATED.3IONS-SCNC: 15 MMOL/L (ref 7–16)
ANION GAP SERPL CALCULATED.3IONS-SCNC: 17 MMOL/L (ref 7–16)
AST SERPL-CCNC: 18 U/L (ref 0–31)
BACTERIA URNS QL MICRO: ABNORMAL
BASOPHILS # BLD: 0 K/UL (ref 0–0.2)
BASOPHILS # BLD: 0.05 K/UL (ref 0–0.2)
BASOPHILS NFR BLD: 0 % (ref 0–2)
BASOPHILS NFR BLD: 0 % (ref 0–2)
BILIRUB SERPL-MCNC: 0.5 MG/DL (ref 0–1.2)
BILIRUB UR QL STRIP: NEGATIVE
BUN SERPL-MCNC: 38 MG/DL (ref 6–23)
BUN SERPL-MCNC: 40 MG/DL (ref 6–23)
BUN SERPL-MCNC: 40 MG/DL (ref 6–23)
CALCIUM SERPL-MCNC: 12.7 MG/DL (ref 8.6–10.2)
CALCIUM SERPL-MCNC: 15.4 MG/DL (ref 8.6–10.2)
CALCIUM SERPL-MCNC: 15.9 MG/DL (ref 8.6–10.2)
CHLORIDE SERPL-SCNC: 88 MMOL/L (ref 98–107)
CHLORIDE SERPL-SCNC: 90 MMOL/L (ref 98–107)
CHLORIDE SERPL-SCNC: 97 MMOL/L (ref 98–107)
CLARITY UR: ABNORMAL
CO2 SERPL-SCNC: 25 MMOL/L (ref 22–29)
CO2 SERPL-SCNC: 25 MMOL/L (ref 22–29)
CO2 SERPL-SCNC: 28 MMOL/L (ref 22–29)
COLOR UR: YELLOW
CREAT SERPL-MCNC: 2.5 MG/DL (ref 0.5–1)
CREAT SERPL-MCNC: 2.6 MG/DL (ref 0.5–1)
CREAT SERPL-MCNC: 2.7 MG/DL (ref 0.5–1)
CREAT UR-MCNC: 102.1 MG/DL (ref 29–226)
EKG ATRIAL RATE: 85 BPM
EKG P AXIS: 52 DEGREES
EKG P-R INTERVAL: 170 MS
EKG Q-T INTERVAL: 418 MS
EKG QRS DURATION: 156 MS
EKG QTC CALCULATION (BAZETT): 497 MS
EKG R AXIS: -69 DEGREES
EKG T AXIS: 97 DEGREES
EKG VENTRICULAR RATE: 85 BPM
EOSINOPHIL # BLD: 0 K/UL (ref 0.05–0.5)
EOSINOPHIL # BLD: 0.05 K/UL (ref 0.05–0.5)
EOSINOPHILS RELATIVE PERCENT: 0 % (ref 0–6)
EOSINOPHILS RELATIVE PERCENT: 0 % (ref 0–6)
ERYTHROCYTE [DISTWIDTH] IN BLOOD BY AUTOMATED COUNT: 15.6 % (ref 11.5–15)
ERYTHROCYTE [DISTWIDTH] IN BLOOD BY AUTOMATED COUNT: 15.7 % (ref 11.5–15)
GFR, ESTIMATED: 19 ML/MIN/1.73M2
GFR, ESTIMATED: 19 ML/MIN/1.73M2
GFR, ESTIMATED: 21 ML/MIN/1.73M2
GLUCOSE SERPL-MCNC: 124 MG/DL (ref 74–99)
GLUCOSE SERPL-MCNC: 145 MG/DL (ref 74–99)
GLUCOSE SERPL-MCNC: 148 MG/DL (ref 74–99)
GLUCOSE UR STRIP-MCNC: NEGATIVE MG/DL
HCT VFR BLD AUTO: 45.6 % (ref 34–48)
HCT VFR BLD AUTO: 48 % (ref 34–48)
HGB BLD-MCNC: 15 G/DL (ref 11.5–15.5)
HGB BLD-MCNC: 15.3 G/DL (ref 11.5–15.5)
HGB UR QL STRIP.AUTO: ABNORMAL
IMM GRANULOCYTES # BLD AUTO: 0.2 K/UL (ref 0–0.58)
IMM GRANULOCYTES NFR BLD: 1 % (ref 0–5)
KETONES UR STRIP-MCNC: ABNORMAL MG/DL
LACTATE BLDV-SCNC: 0.9 MMOL/L (ref 0.5–1.9)
LACTATE BLDV-SCNC: 1.3 MMOL/L (ref 0.5–2.2)
LACTATE BLDV-SCNC: 1.5 MMOL/L (ref 0.5–1.9)
LEUKOCYTE ESTERASE UR QL STRIP: ABNORMAL
LIPASE SERPL-CCNC: 1033 U/L (ref 13–60)
LYMPHOCYTES NFR BLD: 0.61 K/UL (ref 1.5–4)
LYMPHOCYTES NFR BLD: 0.67 K/UL (ref 1.5–4)
LYMPHOCYTES RELATIVE PERCENT: 4 % (ref 20–42)
LYMPHOCYTES RELATIVE PERCENT: 5 % (ref 20–42)
MAGNESIUM SERPL-MCNC: 1.6 MG/DL (ref 1.6–2.6)
MAGNESIUM SERPL-MCNC: 1.6 MG/DL (ref 1.6–2.6)
MCH RBC QN AUTO: 35.1 PG (ref 26–35)
MCH RBC QN AUTO: 35.8 PG (ref 26–35)
MCHC RBC AUTO-ENTMCNC: 31.9 G/DL (ref 32–34.5)
MCHC RBC AUTO-ENTMCNC: 32.9 G/DL (ref 32–34.5)
MCV RBC AUTO: 108.8 FL (ref 80–99.9)
MCV RBC AUTO: 110.1 FL (ref 80–99.9)
MONOCYTES NFR BLD: 0.61 K/UL (ref 0.1–0.95)
MONOCYTES NFR BLD: 0.85 K/UL (ref 0.1–0.95)
MONOCYTES NFR BLD: 4 % (ref 2–12)
MONOCYTES NFR BLD: 6 % (ref 2–12)
NEUTROPHILS NFR BLD: 87 % (ref 43–80)
NEUTROPHILS NFR BLD: 92 % (ref 43–80)
NEUTS SEG NFR BLD: 12.43 K/UL (ref 1.8–7.3)
NEUTS SEG NFR BLD: 13.98 K/UL (ref 1.8–7.3)
NITRITE UR QL STRIP: NEGATIVE
PH UR STRIP: 5.5 [PH] (ref 5–9)
PHOSPHATE SERPL-MCNC: 3.8 MG/DL (ref 2.5–4.5)
PLATELET ESTIMATE: ABNORMAL
PLATELET, FLUORESCENCE: 138 K/UL (ref 130–450)
PLATELET, FLUORESCENCE: 153 K/UL (ref 130–450)
PMV BLD AUTO: 12.7 FL (ref 7–12)
PMV BLD AUTO: 13.2 FL (ref 7–12)
POTASSIUM SERPL-SCNC: 4 MMOL/L (ref 3.5–5)
POTASSIUM SERPL-SCNC: 4.1 MMOL/L (ref 3.5–5)
POTASSIUM SERPL-SCNC: 4.4 MMOL/L (ref 3.5–5)
PROT SERPL-MCNC: 6.7 G/DL (ref 6.4–8.3)
PROT UR STRIP-MCNC: ABNORMAL MG/DL
PTH-INTACT SERPL-MCNC: 12 PG/ML (ref 15–65)
RBC # BLD AUTO: 4.19 M/UL (ref 3.5–5.5)
RBC # BLD AUTO: 4.36 M/UL (ref 3.5–5.5)
RBC # BLD: ABNORMAL 10*6/UL
RBC # BLD: ABNORMAL 10*6/UL
RBC #/AREA URNS HPF: ABNORMAL /HPF
SODIUM SERPL-SCNC: 131 MMOL/L (ref 132–146)
SODIUM SERPL-SCNC: 132 MMOL/L (ref 132–146)
SODIUM SERPL-SCNC: 135 MMOL/L (ref 132–146)
SP GR UR STRIP: 1.02 (ref 1–1.03)
TROPONIN I SERPL HS-MCNC: 61 NG/L (ref 0–9)
TROPONIN I SERPL HS-MCNC: 66 NG/L (ref 0–9)
UROBILINOGEN UR STRIP-ACNC: 0.2 EU/DL (ref 0–1)
WBC #/AREA URNS HPF: ABNORMAL /HPF
WBC OTHER # BLD: 14.3 K/UL (ref 4.5–11.5)
WBC OTHER # BLD: 15.2 K/UL (ref 4.5–11.5)

## 2024-06-01 PROCEDURE — 2580000003 HC RX 258: Performed by: EMERGENCY MEDICINE

## 2024-06-01 PROCEDURE — 2060000000 HC ICU INTERMEDIATE R&B

## 2024-06-01 PROCEDURE — 80069 RENAL FUNCTION PANEL: CPT

## 2024-06-01 PROCEDURE — 6360000002 HC RX W HCPCS: Performed by: INTERNAL MEDICINE

## 2024-06-01 PROCEDURE — 6360000002 HC RX W HCPCS: Performed by: EMERGENCY MEDICINE

## 2024-06-01 PROCEDURE — 83605 ASSAY OF LACTIC ACID: CPT

## 2024-06-01 PROCEDURE — 81001 URINALYSIS AUTO W/SCOPE: CPT

## 2024-06-01 PROCEDURE — 83690 ASSAY OF LIPASE: CPT

## 2024-06-01 PROCEDURE — 99285 EMERGENCY DEPT VISIT HI MDM: CPT

## 2024-06-01 PROCEDURE — 82306 VITAMIN D 25 HYDROXY: CPT

## 2024-06-01 PROCEDURE — 74176 CT ABD & PELVIS W/O CONTRAST: CPT

## 2024-06-01 PROCEDURE — 84484 ASSAY OF TROPONIN QUANT: CPT

## 2024-06-01 PROCEDURE — 85025 COMPLETE CBC W/AUTO DIFF WBC: CPT

## 2024-06-01 PROCEDURE — 93005 ELECTROCARDIOGRAM TRACING: CPT | Performed by: EMERGENCY MEDICINE

## 2024-06-01 PROCEDURE — 87077 CULTURE AEROBIC IDENTIFY: CPT

## 2024-06-01 PROCEDURE — 96375 TX/PRO/DX INJ NEW DRUG ADDON: CPT

## 2024-06-01 PROCEDURE — 87086 URINE CULTURE/COLONY COUNT: CPT

## 2024-06-01 PROCEDURE — 99223 1ST HOSP IP/OBS HIGH 75: CPT | Performed by: FAMILY MEDICINE

## 2024-06-01 PROCEDURE — 36415 COLL VENOUS BLD VENIPUNCTURE: CPT

## 2024-06-01 PROCEDURE — 6370000000 HC RX 637 (ALT 250 FOR IP): Performed by: FAMILY MEDICINE

## 2024-06-01 PROCEDURE — 83735 ASSAY OF MAGNESIUM: CPT

## 2024-06-01 PROCEDURE — 96361 HYDRATE IV INFUSION ADD-ON: CPT

## 2024-06-01 PROCEDURE — 87040 BLOOD CULTURE FOR BACTERIA: CPT

## 2024-06-01 PROCEDURE — 2580000003 HC RX 258: Performed by: INTERNAL MEDICINE

## 2024-06-01 PROCEDURE — 82570 ASSAY OF URINE CREATININE: CPT

## 2024-06-01 PROCEDURE — 93010 ELECTROCARDIOGRAM REPORT: CPT | Performed by: INTERNAL MEDICINE

## 2024-06-01 PROCEDURE — 96365 THER/PROPH/DIAG IV INF INIT: CPT

## 2024-06-01 PROCEDURE — 80158 DRUG ASSAY CYCLOSPORINE: CPT

## 2024-06-01 PROCEDURE — 2580000003 HC RX 258: Performed by: FAMILY MEDICINE

## 2024-06-01 PROCEDURE — 51701 INSERT BLADDER CATHETER: CPT

## 2024-06-01 PROCEDURE — 6360000002 HC RX W HCPCS: Performed by: FAMILY MEDICINE

## 2024-06-01 PROCEDURE — 80048 BASIC METABOLIC PNL TOTAL CA: CPT

## 2024-06-01 PROCEDURE — 96372 THER/PROPH/DIAG INJ SC/IM: CPT

## 2024-06-01 PROCEDURE — 80053 COMPREHEN METABOLIC PANEL: CPT

## 2024-06-01 PROCEDURE — 83970 ASSAY OF PARATHORMONE: CPT

## 2024-06-01 RX ORDER — VANCOMYCIN HYDROCHLORIDE 125 MG/1
125 CAPSULE ORAL
Status: DISCONTINUED | OUTPATIENT
Start: 2024-06-03 | End: 2024-06-02 | Stop reason: CLARIF

## 2024-06-01 RX ORDER — SODIUM CHLORIDE 9 MG/ML
INJECTION, SOLUTION INTRAVENOUS CONTINUOUS
Status: DISCONTINUED | OUTPATIENT
Start: 2024-06-01 | End: 2024-06-02

## 2024-06-01 RX ORDER — PROCHLORPERAZINE EDISYLATE 5 MG/ML
10 INJECTION INTRAMUSCULAR; INTRAVENOUS ONCE
Status: COMPLETED | OUTPATIENT
Start: 2024-06-01 | End: 2024-06-01

## 2024-06-01 RX ORDER — MYCOPHENOLATE MOFETIL 250 MG/1
1000 CAPSULE ORAL 2 TIMES DAILY
Status: DISCONTINUED | OUTPATIENT
Start: 2024-06-01 | End: 2024-06-03 | Stop reason: HOSPADM

## 2024-06-01 RX ORDER — CLOZAPINE 25 MG/1
50 TABLET ORAL NIGHTLY
Status: DISCONTINUED | OUTPATIENT
Start: 2024-06-01 | End: 2024-06-03 | Stop reason: HOSPADM

## 2024-06-01 RX ORDER — PROCHLORPERAZINE EDISYLATE 5 MG/ML
10 INJECTION INTRAMUSCULAR; INTRAVENOUS EVERY 6 HOURS PRN
Status: DISCONTINUED | OUTPATIENT
Start: 2024-06-01 | End: 2024-06-03 | Stop reason: HOSPADM

## 2024-06-01 RX ORDER — MYCOPHENOLATE MOFETIL 500 MG/1
1000 TABLET ORAL 2 TIMES DAILY
Status: DISCONTINUED | OUTPATIENT
Start: 2024-06-01 | End: 2024-06-01 | Stop reason: CLARIF

## 2024-06-01 RX ORDER — FOLIC ACID 1 MG/1
1 TABLET ORAL DAILY
Status: DISCONTINUED | OUTPATIENT
Start: 2024-06-02 | End: 2024-06-03 | Stop reason: HOSPADM

## 2024-06-01 RX ORDER — DOCUSATE SODIUM 100 MG/1
200 CAPSULE, LIQUID FILLED ORAL 2 TIMES DAILY
Status: DISCONTINUED | OUTPATIENT
Start: 2024-06-01 | End: 2024-06-03 | Stop reason: HOSPADM

## 2024-06-01 RX ORDER — ATORVASTATIN CALCIUM 20 MG/1
20 TABLET, FILM COATED ORAL DAILY
Status: DISCONTINUED | OUTPATIENT
Start: 2024-06-02 | End: 2024-06-03 | Stop reason: HOSPADM

## 2024-06-01 RX ORDER — CALCITONIN SALMON 200 [USP'U]/ML
400 INJECTION, SOLUTION INTRAMUSCULAR; SUBCUTANEOUS EVERY 12 HOURS
Status: COMPLETED | OUTPATIENT
Start: 2024-06-01 | End: 2024-06-02

## 2024-06-01 RX ORDER — 0.9 % SODIUM CHLORIDE 0.9 %
1000 INTRAVENOUS SOLUTION INTRAVENOUS ONCE
Status: COMPLETED | OUTPATIENT
Start: 2024-06-01 | End: 2024-06-01

## 2024-06-01 RX ORDER — SODIUM CHLORIDE 0.9 % (FLUSH) 0.9 %
5-40 SYRINGE (ML) INJECTION PRN
Status: DISCONTINUED | OUTPATIENT
Start: 2024-06-01 | End: 2024-06-03 | Stop reason: HOSPADM

## 2024-06-01 RX ORDER — ONDANSETRON 2 MG/ML
4 INJECTION INTRAMUSCULAR; INTRAVENOUS ONCE
Status: COMPLETED | OUTPATIENT
Start: 2024-06-01 | End: 2024-06-01

## 2024-06-01 RX ORDER — ONDANSETRON 4 MG/1
4 TABLET, ORALLY DISINTEGRATING ORAL EVERY 8 HOURS PRN
Status: DISCONTINUED | OUTPATIENT
Start: 2024-06-01 | End: 2024-06-01

## 2024-06-01 RX ORDER — FERROUS SULFATE 325(65) MG
325 TABLET ORAL
Status: DISCONTINUED | OUTPATIENT
Start: 2024-06-02 | End: 2024-06-03 | Stop reason: HOSPADM

## 2024-06-01 RX ORDER — ACETAMINOPHEN 325 MG/1
650 TABLET ORAL EVERY 6 HOURS PRN
Status: DISCONTINUED | OUTPATIENT
Start: 2024-06-01 | End: 2024-06-03 | Stop reason: HOSPADM

## 2024-06-01 RX ORDER — ONDANSETRON 2 MG/ML
4 INJECTION INTRAMUSCULAR; INTRAVENOUS EVERY 6 HOURS PRN
Status: DISCONTINUED | OUTPATIENT
Start: 2024-06-01 | End: 2024-06-01

## 2024-06-01 RX ORDER — VENLAFAXINE HYDROCHLORIDE 150 MG/1
150 TABLET, EXTENDED RELEASE ORAL
Status: DISCONTINUED | OUTPATIENT
Start: 2024-06-02 | End: 2024-06-02 | Stop reason: CLARIF

## 2024-06-01 RX ORDER — SODIUM CHLORIDE 0.9 % (FLUSH) 0.9 %
5-40 SYRINGE (ML) INJECTION EVERY 12 HOURS SCHEDULED
Status: DISCONTINUED | OUTPATIENT
Start: 2024-06-01 | End: 2024-06-03 | Stop reason: HOSPADM

## 2024-06-01 RX ORDER — ACETAMINOPHEN 650 MG/1
650 SUPPOSITORY RECTAL EVERY 6 HOURS PRN
Status: DISCONTINUED | OUTPATIENT
Start: 2024-06-01 | End: 2024-06-03 | Stop reason: HOSPADM

## 2024-06-01 RX ORDER — PREDNISONE 5 MG/1
5 TABLET ORAL DAILY
Status: DISCONTINUED | OUTPATIENT
Start: 2024-06-02 | End: 2024-06-03 | Stop reason: HOSPADM

## 2024-06-01 RX ORDER — LEVOTHYROXINE SODIUM 0.05 MG/1
50 TABLET ORAL DAILY
Status: DISCONTINUED | OUTPATIENT
Start: 2024-06-02 | End: 2024-06-03 | Stop reason: HOSPADM

## 2024-06-01 RX ORDER — SODIUM CHLORIDE 9 MG/ML
INJECTION, SOLUTION INTRAVENOUS PRN
Status: DISCONTINUED | OUTPATIENT
Start: 2024-06-01 | End: 2024-06-03 | Stop reason: HOSPADM

## 2024-06-01 RX ADMIN — METOPROLOL TARTRATE 12.5 MG: 25 TABLET, FILM COATED ORAL at 23:03

## 2024-06-01 RX ADMIN — CALCITONIN SALMON 400 UNITS: 200 INJECTION, SOLUTION INTRAMUSCULAR; SUBCUTANEOUS at 16:39

## 2024-06-01 RX ADMIN — Medication 10 ML: at 23:03

## 2024-06-01 RX ADMIN — SODIUM CHLORIDE 1000 ML: 9 INJECTION, SOLUTION INTRAVENOUS at 16:02

## 2024-06-01 RX ADMIN — SODIUM CHLORIDE: 9 INJECTION, SOLUTION INTRAVENOUS at 21:57

## 2024-06-01 RX ADMIN — PROCHLORPERAZINE EDISYLATE 10 MG: 5 INJECTION INTRAMUSCULAR; INTRAVENOUS at 16:22

## 2024-06-01 RX ADMIN — SODIUM CHLORIDE: 9 INJECTION, SOLUTION INTRAVENOUS at 19:00

## 2024-06-01 RX ADMIN — ONDANSETRON 4 MG: 2 INJECTION INTRAMUSCULAR; INTRAVENOUS at 14:16

## 2024-06-01 RX ADMIN — PIPERACILLIN AND TAZOBACTAM 4500 MG: 4; .5 INJECTION, POWDER, LYOPHILIZED, FOR SOLUTION INTRAVENOUS at 16:26

## 2024-06-01 RX ADMIN — APIXABAN 5 MG: 5 TABLET, FILM COATED ORAL at 23:02

## 2024-06-01 RX ADMIN — DOCUSATE SODIUM 200 MG: 100 CAPSULE, LIQUID FILLED ORAL at 23:02

## 2024-06-01 RX ADMIN — VANCOMYCIN HYDROCHLORIDE 1500 MG: 10 INJECTION, POWDER, LYOPHILIZED, FOR SOLUTION INTRAVENOUS at 23:02

## 2024-06-01 RX ADMIN — SODIUM CHLORIDE 1000 ML: 9 INJECTION, SOLUTION INTRAVENOUS at 14:16

## 2024-06-01 RX ADMIN — MYCOPHENOLATE MOFETIL 1000 MG: 250 CAPSULE ORAL at 23:03

## 2024-06-01 ASSESSMENT — PAIN - FUNCTIONAL ASSESSMENT: PAIN_FUNCTIONAL_ASSESSMENT: 0-10

## 2024-06-01 ASSESSMENT — PAIN SCALES - GENERAL
PAINLEVEL_OUTOF10: 0
PAINLEVEL_OUTOF10: 9

## 2024-06-01 NOTE — ED PROVIDER NOTES
ED PROVIDER NOTE    Chief Complaint   Patient presents with    Dysuria     Burning urination with abdominal pain for 2-3 days with nausea       HPI:  6/1/24,   Time: 1:08 PM EDT       Natali Wolff is a 69 y.o. female presenting to the ED for abdominal pain.  Patient has been having low back pain for the last couple of months, had an epidural steroid injection at several pain management clinic a few days ago.  She states that the pain was better for couple days but has since recurred.  Right-sided low back pain radiating to the right lower extremity.  Still able to ambulate.  No changes in bowel or bladder function.  She also has noticed onset of diffuse abdominal discomfort, nausea, dysuria.  No associated fever, chills, cough, chest pain, shortness of breath.  No diarrhea.  No black or bloody stools.  Decreased p.o. intake due to nausea.  History notable for renal transplant in 2015.  Patient is on cyclosporine, mycophenolate, and prednisone.    Chart review: hx of COPD, CKD, DVT on apixaban, HLD, bipolar disorder, afib, LBBB, renal transplant    Reviewed transplant nephrology outpatient progress note from 4/10/2024 from [facility] by Dr. Carlos:  Dx ESRD status post kidney transplant.  Renal allograft function is stable.  Dx immunosuppression, tacrolimus level last check was at goal, continue current immunosuppression regimen.      Review of Systems:     Review of Systems  Pertinent positives and negatives as stated in HPI     --------------------------------------------- PAST HISTORY ---------------------------------------------  Past Medical History:   Past Medical History:   Diagnosis Date    Abnormal EKG     left bundle branch block    Acute cystitis without hematuria 01/20/2023    Acute cystitis without hematuria 01/20/2023    Acute exacerbation of chronic obstructive pulmonary disease (COPD) ([facility]) 05/19/2022    Acute gout involving toe of right foot 09/03/2020    Acute on  deterioration or permanent disability.      Accordingly this patient received 30 minutes of critical care time, excluding separately billable procedures.     Independent interpretation of tests:  Hypercalcemia 15.9      The patient presents with a new acute problem or complaint.        Counseling:   The emergency provider has spoken with the patient and discussed today’s results, in addition to providing specific details for the plan of care and counseling regarding the diagnosis and prognosis.  Questions are answered at this time and they are agreeable with the plan.    ED Course/Medical Decision Makin y.o. female here with dysuria, abdominal pain, nausea.  Also has right-sided low back pain with sciatica for which she recently received an epidural steroid injection.  On arrival patient is nontoxic-appearing, afebrile, hemodynamically stable.  She had labs earlier today which were notable for acute on chronic renal failure with creatinine 2.6 increased from prior baseline of 1.6 on 2024, as well as a new finding of hypercalcemia with a calcium of 15.9.  Lab workup in the ED notable for leukocytosis.  Additional labs are pending.  Nephrology consult was placed for acute on chronic renal failure.  Signed out to oncoming physician pending remainder of lab and imaging results as well as discussion with nephrology and admission.       --------------------------------- IMPRESSION AND DISPOSITION ---------------------------------    IMPRESSION  1. Hypercalcemia    2. Acute renal failure superimposed on chronic kidney disease, unspecified acute renal failure type, unspecified CKD stage (HCC)    3. LBBB (left bundle branch block)    4. History of renal transplant        DISPOSITION  Disposition: pending lab and CT results  Patient condition is serious    NOTE: This report was transcribed using voice recognition software. Every effort was made to ensure accuracy; however, inadvertent computerized transcription

## 2024-06-01 NOTE — ED NOTES
Pulse ox on vital check 83% with good waves, pt denies sob, pt states hx copd and denies baseline O2 use, placed pt on 3L NC and pulse ox improved to 90%. Provider made aware.

## 2024-06-02 ENCOUNTER — APPOINTMENT (OUTPATIENT)
Dept: GENERAL RADIOLOGY | Age: 69
DRG: 438 | End: 2024-06-02
Payer: MEDICARE

## 2024-06-02 PROBLEM — E83.52 HYPERCALCEMIA: Status: ACTIVE | Noted: 2024-06-02

## 2024-06-02 PROBLEM — N17.1 ACUTE RENAL FAILURE WITH ACUTE RENAL CORTICAL NECROSIS SUPERIMPOSED ON STAGE 4 CHRONIC KIDNEY DISEASE (HCC): Status: ACTIVE | Noted: 2022-01-11

## 2024-06-02 PROBLEM — J43.8 OTHER EMPHYSEMA (HCC): Status: ACTIVE | Noted: 2024-06-02

## 2024-06-02 PROBLEM — N18.4 ACUTE RENAL FAILURE WITH ACUTE RENAL CORTICAL NECROSIS SUPERIMPOSED ON STAGE 4 CHRONIC KIDNEY DISEASE (HCC): Status: ACTIVE | Noted: 2022-01-11

## 2024-06-02 LAB
ALBUMIN SERPL-MCNC: 2.9 G/DL (ref 3.5–5.2)
ALP SERPL-CCNC: 49 U/L (ref 35–104)
ALT SERPL-CCNC: 7 U/L (ref 0–32)
ANION GAP SERPL CALCULATED.3IONS-SCNC: 15 MMOL/L (ref 7–16)
AST SERPL-CCNC: 15 U/L (ref 0–31)
BILIRUB SERPL-MCNC: 0.5 MG/DL (ref 0–1.2)
BUN SERPL-MCNC: 37 MG/DL (ref 6–23)
CALCIUM SERPL-MCNC: 11.3 MG/DL (ref 8.6–10.2)
CHLORIDE SERPL-SCNC: 102 MMOL/L (ref 98–107)
CO2 SERPL-SCNC: 20 MMOL/L (ref 22–29)
CREAT SERPL-MCNC: 2.4 MG/DL (ref 0.5–1)
ERYTHROCYTE [DISTWIDTH] IN BLOOD BY AUTOMATED COUNT: 15.9 % (ref 11.5–15)
GFR, ESTIMATED: 22 ML/MIN/1.73M2
GLUCOSE SERPL-MCNC: 86 MG/DL (ref 74–99)
HCT VFR BLD AUTO: 44.7 % (ref 34–48)
HGB BLD-MCNC: 14.3 G/DL (ref 11.5–15.5)
MAGNESIUM SERPL-MCNC: 1.3 MG/DL (ref 1.6–2.6)
MCH RBC QN AUTO: 35.8 PG (ref 26–35)
MCHC RBC AUTO-ENTMCNC: 32 G/DL (ref 32–34.5)
MCV RBC AUTO: 112 FL (ref 80–99.9)
PHOSPHATE SERPL-MCNC: 4.1 MG/DL (ref 2.5–4.5)
PLATELET, FLUORESCENCE: 116 K/UL (ref 130–450)
PMV BLD AUTO: 12.9 FL (ref 7–12)
POTASSIUM SERPL-SCNC: 4.9 MMOL/L (ref 3.5–5)
PROT SERPL-MCNC: 5.3 G/DL (ref 6.4–8.3)
PTH-INTACT SERPL-MCNC: 13.9 PG/ML (ref 15–65)
RBC # BLD AUTO: 3.99 M/UL (ref 3.5–5.5)
SODIUM SERPL-SCNC: 137 MMOL/L (ref 132–146)
WBC OTHER # BLD: 18.3 K/UL (ref 4.5–11.5)

## 2024-06-02 PROCEDURE — 6370000000 HC RX 637 (ALT 250 FOR IP): Performed by: INTERNAL MEDICINE

## 2024-06-02 PROCEDURE — 84155 ASSAY OF PROTEIN SERUM: CPT

## 2024-06-02 PROCEDURE — 6370000000 HC RX 637 (ALT 250 FOR IP): Performed by: FAMILY MEDICINE

## 2024-06-02 PROCEDURE — 2580000003 HC RX 258: Performed by: INTERNAL MEDICINE

## 2024-06-02 PROCEDURE — 83735 ASSAY OF MAGNESIUM: CPT

## 2024-06-02 PROCEDURE — 74018 RADEX ABDOMEN 1 VIEW: CPT

## 2024-06-02 PROCEDURE — 2060000000 HC ICU INTERMEDIATE R&B

## 2024-06-02 PROCEDURE — 85027 COMPLETE CBC AUTOMATED: CPT

## 2024-06-02 PROCEDURE — 80053 COMPREHEN METABOLIC PANEL: CPT

## 2024-06-02 PROCEDURE — 84165 PROTEIN E-PHORESIS SERUM: CPT

## 2024-06-02 PROCEDURE — 6360000002 HC RX W HCPCS: Performed by: FAMILY MEDICINE

## 2024-06-02 PROCEDURE — 99223 1ST HOSP IP/OBS HIGH 75: CPT | Performed by: INTERNAL MEDICINE

## 2024-06-02 PROCEDURE — 84100 ASSAY OF PHOSPHORUS: CPT

## 2024-06-02 PROCEDURE — 36415 COLL VENOUS BLD VENIPUNCTURE: CPT

## 2024-06-02 PROCEDURE — 99233 SBSQ HOSP IP/OBS HIGH 50: CPT | Performed by: INTERNAL MEDICINE

## 2024-06-02 PROCEDURE — 51798 US URINE CAPACITY MEASURE: CPT

## 2024-06-02 PROCEDURE — 6360000002 HC RX W HCPCS: Performed by: INTERNAL MEDICINE

## 2024-06-02 PROCEDURE — 86334 IMMUNOFIX E-PHORESIS SERUM: CPT

## 2024-06-02 PROCEDURE — 2580000003 HC RX 258: Performed by: FAMILY MEDICINE

## 2024-06-02 RX ORDER — IPRATROPIUM BROMIDE AND ALBUTEROL SULFATE 2.5; .5 MG/3ML; MG/3ML
1 SOLUTION RESPIRATORY (INHALATION) EVERY 4 HOURS PRN
Status: DISCONTINUED | OUTPATIENT
Start: 2024-06-02 | End: 2024-06-03 | Stop reason: HOSPADM

## 2024-06-02 RX ORDER — CALCITONIN SALMON 200 [USP'U]/ML
400 INJECTION, SOLUTION INTRAMUSCULAR; SUBCUTANEOUS EVERY 12 HOURS
Status: COMPLETED | OUTPATIENT
Start: 2024-06-02 | End: 2024-06-03

## 2024-06-02 RX ORDER — CALCITONIN SALMON 200 [USP'U]/ML
4 INJECTION, SOLUTION INTRAMUSCULAR; SUBCUTANEOUS EVERY 12 HOURS
Status: DISCONTINUED | OUTPATIENT
Start: 2024-06-02 | End: 2024-06-02

## 2024-06-02 RX ORDER — DEXTROSE MONOHYDRATE AND SODIUM CHLORIDE 5; .9 G/100ML; G/100ML
INJECTION, SOLUTION INTRAVENOUS CONTINUOUS
Status: DISCONTINUED | OUTPATIENT
Start: 2024-06-02 | End: 2024-06-03 | Stop reason: HOSPADM

## 2024-06-02 RX ORDER — VENLAFAXINE HYDROCHLORIDE 150 MG/1
150 CAPSULE, EXTENDED RELEASE ORAL
Status: DISCONTINUED | OUTPATIENT
Start: 2024-06-02 | End: 2024-06-03 | Stop reason: HOSPADM

## 2024-06-02 RX ADMIN — FOLIC ACID 1 MG: 1 TABLET ORAL at 08:41

## 2024-06-02 RX ADMIN — METOPROLOL TARTRATE 25 MG: 25 TABLET, FILM COATED ORAL at 21:12

## 2024-06-02 RX ADMIN — ACETAMINOPHEN 650 MG: 325 TABLET ORAL at 08:41

## 2024-06-02 RX ADMIN — MYCOPHENOLATE MOFETIL 1000 MG: 250 CAPSULE ORAL at 08:42

## 2024-06-02 RX ADMIN — SODIUM CHLORIDE: 9 INJECTION, SOLUTION INTRAVENOUS at 01:17

## 2024-06-02 RX ADMIN — DOCUSATE SODIUM 200 MG: 100 CAPSULE, LIQUID FILLED ORAL at 08:41

## 2024-06-02 RX ADMIN — PREDNISONE 5 MG: 5 TABLET ORAL at 08:41

## 2024-06-02 RX ADMIN — CLOZAPINE 50 MG: 25 TABLET ORAL at 21:12

## 2024-06-02 RX ADMIN — DEXTROSE AND SODIUM CHLORIDE: 5; 900 INJECTION, SOLUTION INTRAVENOUS at 13:40

## 2024-06-02 RX ADMIN — PIPERACILLIN AND TAZOBACTAM 3375 MG: 3; .375 INJECTION, POWDER, LYOPHILIZED, FOR SOLUTION INTRAVENOUS at 18:07

## 2024-06-02 RX ADMIN — ATORVASTATIN CALCIUM 20 MG: 20 TABLET, FILM COATED ORAL at 08:41

## 2024-06-02 RX ADMIN — DOCUSATE SODIUM 200 MG: 100 CAPSULE, LIQUID FILLED ORAL at 21:12

## 2024-06-02 RX ADMIN — CALCITONIN SALMON 400 UNITS: 200 INJECTION, SOLUTION INTRAMUSCULAR; SUBCUTANEOUS at 04:41

## 2024-06-02 RX ADMIN — APIXABAN 5 MG: 5 TABLET, FILM COATED ORAL at 21:12

## 2024-06-02 RX ADMIN — METOPROLOL TARTRATE 12.5 MG: 25 TABLET, FILM COATED ORAL at 08:41

## 2024-06-02 RX ADMIN — PIPERACILLIN AND TAZOBACTAM 3375 MG: 3; .375 INJECTION, POWDER, LYOPHILIZED, FOR SOLUTION INTRAVENOUS at 02:33

## 2024-06-02 RX ADMIN — MYCOPHENOLATE MOFETIL 1000 MG: 250 CAPSULE ORAL at 21:12

## 2024-06-02 RX ADMIN — PIPERACILLIN AND TAZOBACTAM 3375 MG: 3; .375 INJECTION, POWDER, LYOPHILIZED, FOR SOLUTION INTRAVENOUS at 11:15

## 2024-06-02 RX ADMIN — VENLAFAXINE HYDROCHLORIDE 150 MG: 150 CAPSULE, EXTENDED RELEASE ORAL at 08:42

## 2024-06-02 RX ADMIN — Medication 10 ML: at 21:17

## 2024-06-02 RX ADMIN — LEVOTHYROXINE SODIUM 50 MCG: 0.05 TABLET ORAL at 05:41

## 2024-06-02 RX ADMIN — FERROUS SULFATE TAB 325 MG (65 MG ELEMENTAL FE) 325 MG: 325 (65 FE) TAB at 08:41

## 2024-06-02 RX ADMIN — APIXABAN 5 MG: 5 TABLET, FILM COATED ORAL at 08:42

## 2024-06-02 RX ADMIN — CALCITONIN SALMON 400 UNITS: 200 INJECTION, SOLUTION INTRAMUSCULAR; SUBCUTANEOUS at 16:11

## 2024-06-02 ASSESSMENT — PAIN DESCRIPTION - DESCRIPTORS: DESCRIPTORS: ACHING;SHARP

## 2024-06-02 ASSESSMENT — PAIN DESCRIPTION - ORIENTATION: ORIENTATION: LEFT;RIGHT;LOWER

## 2024-06-02 ASSESSMENT — PAIN DESCRIPTION - LOCATION: LOCATION: BACK

## 2024-06-02 NOTE — CONSULTS
6/2/2024 9:30 AM  Service: Urology  Group: [facility] (Remberto/Paulette/Matthias)    Natali Wolff  [id number]     Chief Complaint:    Nephrolithiasis and transplanted kidney      History of Present Illness:      The patient is a 69 y.o. female patient who presented to ER with epigastric pain and dysuria times a few days.  She does have nonspecific back pain and gets epidural steroid injections with pain management.  Creatinine was noted at 2.7 and elevated.  She was noted with a tiny stone in her RLQ transplanted kidney with gas in collecting system and urinary bladder.  We were consulted for this.  Asymptomatic HIV status. Hx hepatitis B and hepatitis C. CTAP was reviewed and on coronal view, a suspected ureteral stone appears to be outside of the ureter. No hydronephrosis is seen. She does have gas in both the bladder and transplanted kidney.      Past Medical History:   Diagnosis Date    Abnormal EKG     left bundle branch block    Acute cystitis without hematuria 01/20/2023    Acute cystitis without hematuria 01/20/2023    Acute exacerbation of chronic obstructive pulmonary disease (COPD) (HCC) 05/19/2022    Acute gout involving toe of right foot 09/03/2020    Acute on chronic combined systolic (congestive) and diastolic (congestive) heart failure (HCC)     asthmatic bronchitis 03/07/2019    Bipolar disorder (HCC) 11/26/2018    Cancer (HCC)     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    Cerebrovascular disease     Clotting disorder (HCC) 1985    thrombophlebitis after c section delivery    Depression     15 years followed by dr zuluaga    Hemodialysis patient (HCC)     History of blood transfusion     History of renal transplant 10/2015     in [place] Dr Oscar Wilkinson    Hyperlipidemia     Hypertension     Hypothyroidism     Hypothyroidism 11/26/2018    Leg swelling 09/03/2020    Leukocytosis 01/10/2022    Lymphedema of both lower extremities 09/03/2020    Paroxysmal atrial fibrillation  transplant  kidney. Some gas in the right lower quadrant transplant kidney collecting  system and the urinary bladder, which could be related to recent  instrumentation or infection with gas-forming organism.       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 Christiano Caldwell while patient was still in the ER yesterday.  Should the patient become unstable hemodynamically at any time, she will require interventional radiology placement of a nephrostomy tube into the transplant kidney off anticoagulants.  We will continue to follow her as long as she is hospitalized in this facility but hopefully she can be transferred to [facility] soon.  Thank you for allowing us to participate in the care of this patient    Electronically signed by KATINA Estevez CNP on 6/2/2024 at 9:30 AM        The patient was seen and examined.  I have reviewed the medical record in detail.  I agree with the plan as outlined by HARISH Estevez.    Electronically signed by Brennon Caldwell MD  3:54 PM  6/2/2024

## 2024-06-02 NOTE — PROGRESS NOTES
Pharmacy Consultation Note  (Antibiotic Dosing and Monitoring)    Initial consult date: 06/02/2024  Consulting physician/provider: Dr sun Escalante  Drug: Vancomycin  Indication: UTI    Age/  Gender Height Weight IBW  Allergy Information   69 y.o./female 165.1 cm (5' 5\") 97.1 kg (214 lb)     Ideal body weight: 57 kg (125 lb 10.6 oz)  Adjusted ideal body weight: 73 kg (160 lb 15 oz)   Cipro [ciprofloxacin hcl], Macrodantin [nitrofurantoin macrocrystal], and Sulfa antibiotics      Renal Function:  Recent Labs     06/01/24  1200 06/01/24  1415 06/01/24 2047   BUN 40* 40* 38*   CREATININE 2.6* 2.7* 2.5*       Intake/Output Summary (Last 24 hours) at 6/2/2024 0224  Last data filed at 6/1/2024 1853  Gross per 24 hour   Intake 1100 ml   Output --   Net 1100 ml       Vancomycin Monitoring:  Trough:  No results for input(s): \"VANCOTROUGH\" in the last 72 hours.  Random:  No results for input(s): \"VANCORANDOM\" in the last 72 hours.    Vancomycin Administration Times:  Recent vancomycin administrations                     vancomycin (VANCOCIN) 1,500 mg in sodium chloride 0.9 % 250 mL IVPB (mg) 1,500 mg New Bag 06/01/24 2302                    Assessment:  Patient is a 69 y.o. female who has been initiated on vancomycin  Estimated Creatinine Clearance: 25 mL/min (A) (based on SCr of 2.5 mg/dL (H)).  To dose vancomycin, pharmacy will be utilizing dosing based off of levels because of patient's renal impairment/insufficiency    Plan:  Vancomycin 1500 mg x 1 given  Will check vancomycin levels when appropriate  Will continue to monitor renal function   Pharmacy to follow      Lisy Rowley RPH 6/2/2024 2:24 AM  [facility]: [phone]

## 2024-06-02 NOTE — ED NOTES
Attempted to call report to [facility], line rang busy until it disconnected, charge RN made aware, will attempt to call again in 15 mins

## 2024-06-02 NOTE — PROGRESS NOTES
Pharmacy Consultation Note  (Antibiotic Dosing and Monitoring)    Initial consult date: 06/02/2024  Consulting physician/provider: Dr sun Escalante  Drug: Vancomycin  Indication: UTI    Age/  Gender Height Weight IBW  Allergy Information   69 y.o./female 165.1 cm (5' 5\") 97.1 kg (214 lb)     Ideal body weight: 57 kg (125 lb 10.6 oz)  Adjusted ideal body weight: 73 kg (160 lb 15 oz)   Cipro [ciprofloxacin hcl], Macrodantin [nitrofurantoin macrocrystal], and Sulfa antibiotics      Renal Function:  Recent Labs     06/01/24  1415 06/01/24 2047 06/02/24  1034   BUN 40* 38* 37*   CREATININE 2.7* 2.5* 2.4*         Intake/Output Summary (Last 24 hours) at 6/2/2024 1229  Last data filed at 6/1/2024 1853  Gross per 24 hour   Intake 1100 ml   Output --   Net 1100 ml         Vancomycin Monitoring:  Trough:  No results for input(s): \"VANCOTROUGH\" in the last 72 hours.  Random:  No results for input(s): \"VANCORANDOM\" in the last 72 hours.    Vancomycin Administration Times:  Recent vancomycin administrations                     vancomycin (VANCOCIN) 1,500 mg in sodium chloride 0.9 % 250 mL IVPB (mg) 1,500 mg New Bag 06/01/24 2302                      Assessment:  Patient is a 69 y.o. female who has been initiated on vancomycin  Estimated Creatinine Clearance: 26 mL/min (A) (based on SCr of 2.4 mg/dL (H)).  To dose vancomycin, pharmacy will be utilizing dosing based off of levels because of patient's renal impairment/insufficiency    Plan:  Vancomycin 1500 mg x 1 given  Random level tomorrow with morning labs  Will continue to monitor renal function - SCr tomorrow  Pharmacy to follow      Telma Deras, PharmD, BCPS 6/2/2024 12:30 PM   [phone]     [facility]: [phone]

## 2024-06-02 NOTE — PROGRESS NOTES
Antibiotic Extended Infusion Policy     This patient is on medication that requires renal, weight, and/or indication dose adjustment.      Date Body Weight IBW  Adjusted BW SCr  CrCl Dialysis status BMI   6/2/2024 97 kg (213 lb 13.5 oz) Ideal body weight: 57 kg (125 lb 10.6 oz)  Adjusted ideal body weight: 73 kg (160 lb 15 oz) Serum creatinine: 2.5 mg/dL (H) 06/01/24 2047  Estimated creatinine clearance: 25 mL/min (A) N/a Body mass index is 35.59 kg/m².       Pharmacy has dose-adjusted the following medication(s):    Ordered Medication: Zosyn 2250 my every 12 hours      Order Changed/converted to: Zosyn 3375mg q8H    These changes were made per protocol according to the [facility]   Automatic Extended Infusion Dose Adjustment Policy and [facility]   Automatic Renal Dose Adjustment Policy.     *Please note this dose may need readjusted if patient's condition changes.    Please contact pharmacy with any questions regarding these changes.    Lisy Rowley PharmD 6/2/2024 2:00 AM  [facility]: [phone]

## 2024-06-02 NOTE — CONSULTS
INPATIENT CARDIOLOGY CONSULT    Name: Natali Wolff    Age: 69 y.o.    Date of Admission: 6/1/2024 12:45 PM    Date of Service: 6/2/2024    Reason for Consultation:   Chief Complaint   Patient presents with    Dysuria     Burning urination with abdominal pain for 2-3 days with nausea          Referring Physician: Kateryna Escalante DO    History of Present Illness: 69-year-old female with history of heart failure with reduced EF, history of paroxysmal atrial fibrillation, chronic left bundle amado block, hypertension, hyperlipidemia, peripheral vascular disease, chronic kidney disease status post renal transplant 2015 at , history of DVT has been on Eliquis, hypothyroidism, prior tobacco abuse, history of gout and obesity who is hospitalized for acute pancreatitis and hypercalcemia as well as UTI and cardiology consulted for nonsustained VT.            Past Medical History:  Past Medical History:   Diagnosis Date    Abnormal EKG     left bundle branch block    Acute cystitis without hematuria 01/20/2023    Acute cystitis without hematuria 01/20/2023    Acute exacerbation of chronic obstructive pulmonary disease (COPD) ([facility]) 05/19/2022    Acute gout involving toe of right foot 09/03/2020    Acute on chronic combined systolic (congestive) and diastolic (congestive) heart failure ([facility])     asthmatic bronchitis 03/07/2019    Bipolar disorder ([facility]) 11/26/2018    Cancer ([facility])     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    Cerebrovascular disease     Clotting disorder ([facility]) 1985    thrombophlebitis after c section delivery    Depression     15 years followed by dr zuluaga    Hemodialysis patient ([facility])     History of blood transfusion     History of renal transplant 10/2015     in [place] Dr Oscar Wilkinson    Hyperlipidemia     Hypertension     Hypothyroidism     Hypothyroidism 11/26/2018    Leg swelling 09/03/2020    Leukocytosis 01/10/2022    Lymphedema of both lower extremities  you have any questions or concerns.    Rafa Renteria MD  [facility]

## 2024-06-02 NOTE — PROGRESS NOTES
Progress Note  Date:2024       Room:[facility]/0618-01  Patient Name:Natali Wolff     YOB: 1955     Age:69 y.o.        Subjective    Subjective:  Symptoms:  Stable.  She reports weakness.  No shortness of breath, cough, chest pain or diarrhea.  (Nausea and lethargic ).    Diet:  Dietary issues: unable to tolerate liquid diet.  She reports  nausea.    Activity level: Impaired due to weakness.       Review of Systems   Constitutional:  Positive for fatigue. Negative for appetite change, chills, fever and unexpected weight change.   HENT:  Negative for congestion, facial swelling, mouth sores, rhinorrhea and sinus pain.    Eyes:  Negative for visual disturbance.   Respiratory:  Negative for cough, chest tightness, shortness of breath and wheezing.    Cardiovascular:  Negative for chest pain and leg swelling.   Gastrointestinal:  Positive for abdominal pain and nausea. Negative for abdominal distention, blood in stool, constipation and diarrhea.   Genitourinary:  Negative for difficulty urinating, dysuria, frequency and urgency.   Musculoskeletal:  Negative for joint swelling.   Skin:  Negative for rash.   Neurological:  Positive for weakness. Negative for dizziness, syncope, light-headedness and headaches.   Psychiatric/Behavioral:  Negative for behavioral problems, confusion and hallucinations.      Objective         Vitals Last 24 Hours:  TEMPERATURE:  Temp  Av.8 °F (36.6 °C)  Min: 97 °F (36.1 °C)  Max: 99.3 °F (37.4 °C)  RESPIRATIONS RANGE: Resp  Av.3  Min: 16  Max: 22  PULSE OXIMETRY RANGE: SpO2  Av.8 %  Min: 84 %  Max: 97 %  PULSE RANGE: Pulse  Av.2  Min: 75  Max: 100  BLOOD PRESSURE RANGE: Systolic (24hrs), Av , Min:109 , Max:149   ; Diastolic (24hrs), Av, Min:61, Max:87    I/O (24Hr):    Intake/Output Summary (Last 24 hours) at 2024 1345  Last data filed at 2024 1853  Gross per 24 hour   Intake 1100 ml   Output --   Net 1100 ml     Objective:  General

## 2024-06-02 NOTE — ED NOTES
Pt report called to 6th floor, pt stable for transport at this time, pt still needing vancomycin (coming from main pharm).

## 2024-06-02 NOTE — H&P
[facility] Hospitalist Group   History and Physical      CHIEF COMPLAINT:  epigastric pain, nausea, low back pain, dysuria, abnormal outpatient labs    History of Present Illness:  69 y.o. female with a history of COPD, renal transplant in 2015 on cyclosporine, mycophenolate and prednisone, HTN, HLD, hypothyroid, hyperparathyroid, combined systolic and diastolic heart failure, AFib, PVD, lymphedema presents with epigastric pain and dysuria as well as fatigue for the past couple of days.  Has also had a couple of months of low back pain, a few days ago had epidural steroid injection with pain management which helped for a couple of days but pain has returned.  Follows with Dr Carlos at  for renal transplant.  Had outpatient labs earlier today with creatinine 2.6 up from baseline 1.6-2.0.  Workup in ED significant for creatinine 2.7 with BUN 40,  anion gap 17, glucose 148, calcium 15.4 (up from 10.1 in April), lactic acid normal at 1.5, troponin 66 and 61, lipase 1033, WBC 15.2.  UA moderate leukocyte esterase.  CT abd/pelvis pancreatic inflammation, large colonic stool burden, tiny stone in RLQ transplant kidney with gas in collecting system and urinary bladder which could be infection with gas forming organism.  Given vanc/zosyn in ED.    Informant(s) for H&P: patient, chart    REVIEW OF SYSTEMS:  no fevers, chills, cp, sob, ha, vision/hearing changes, wt changes, hot/cold flashes, other open skin lesions, diarrhea, constipation, dysuria/hematuria unless noted in HPI. Complete ROS performed with the patient and is otherwise negative.      PMH:  Past Medical History:   Diagnosis Date    Abnormal EKG     left bundle branch block    Acute cystitis without hematuria 01/20/2023    Acute cystitis without hematuria 01/20/2023    Acute exacerbation of chronic obstructive pulmonary disease (COPD) (HCC) 05/19/2022    Acute gout involving toe of right foot 09/03/2020    Acute on chronic combined     Provider, MD Radha   cycloSPORINE (SANDIMMUNE) 100 MG capsule Take 1 capsule by mouth 2 times daily    Radha Tovar MD   mycophenolate (CELLCEPT) 500 MG tablet Take 2 tablets by mouth 2 times daily    ProviderRadha MD       Allergies:    Cipro [ciprofloxacin hcl], Macrodantin [nitrofurantoin macrocrystal], and Sulfa antibiotics    Social History:    reports that she quit smoking about 8 years ago. Her smoking use included cigarettes. She started smoking about 28 years ago. She has a 20.0 pack-year smoking history. She has never used smokeless tobacco. She reports that she does not drink alcohol and does not use drugs.      Family History:   family history includes Breast Cancer in her maternal grandmother and sister; Dementia in her father; Diabetes in her father and mother.     PHYSICAL EXAM:  Vitals:  /63   Pulse 89   Temp 97.3 °F (36.3 °C) (Infrared)   Resp 18   Wt 97.1 kg (214 lb)   SpO2 94%   BMI 39.14 kg/m²     Constitutional:  obese, somnolent but arousable  Eyes: no scleral icterus, normal lids, no discharge  ENMT:  Normocephalic, atraumatic, mucosa moist, EOMI  Neck:  trachea midline, no JVD  Lungs:  CTA bilaterally, no audible rhonchi or wheezes noted, respirations unlabored, no retractions  Heart::  RRR, no murmur, rub, or gallop noted during exam  Abd:  Soft, generalized tender, non distended, bowel sounds present  :  deferred  MSK: sarcopenia absent  Ext:  Moving all extremities, no edema, pulses present  Skin:  Warm and dry, no rashes on visible skin  Psych: non-anxious affect  Neuro:  PERRL, grossly nonfocal; following commands    LABS:  Recent Labs     06/01/24  1200 06/01/24  1415   * 132   K 4.1 4.4   CL 88* 90*   CO2 28 25   BUN 40* 40*   CREATININE 2.6* 2.7*   GLUCOSE 145* 148*   CALCIUM 15.9* 15.4*       Recent Labs     06/01/24  1200 06/01/24  1415   WBC 14.3* 15.2*   RBC 4.36 4.19   HGB 15.3 15.0   HCT 48.0 45.6   .1* 108.8*   MCH 35.1*  35.8*   MCHC 31.9* 32.9   RDW 15.7* 15.6*   MPV 12.7* 13.2*       No results for input(s): \"POCGLU\" in the last 72 hours.      Radiology: CT ABDOMEN PELVIS WO CONTRAST Additional Contrast? None    Result Date: 6/1/2024  EXAMINATION: CT OF THE ABDOMEN AND PELVIS WITHOUT CONTRAST 6/1/2024 2:58 pm TECHNIQUE: CT of the abdomen and pelvis was performed without the administration of intravenous contrast. Multiplanar reformatted images are provided for review. Automated exposure control, iterative reconstruction, and/or weight based adjustment of the mA/kV was utilized to reduce the radiation dose to as low as reasonably achievable. COMPARISON: None. HISTORY: ORDERING SYSTEM PROVIDED HISTORY: abdominal pain, diffuse ttp, nausea, dysuria, low back pain, recent epidural spinal injection TECHNOLOGIST PROVIDED HISTORY: Reason for exam:->abdominal pain, diffuse ttp, nausea, dysuria, low back pain, recent epidural spinal injection Additional Contrast?->None Decision Support Exception - unselect if not a suspected or confirmed emergency medical condition->Emergency Medical Condition (MA) FINDINGS: Evaluation of the solid organs and vascular structures limited without IV contrast.  Small fat filled umbilical hernia.  Dilated central pulmonary vasculature suggestive of pulmonary hypertension.  Coronary artery disease. Cardiomegaly.  Small hiatal hernia.  Bibasilar subsegmental atelectasis or scarring.  No pneumoperitoneum.  No focal hepatic abnormality.  No radiopaque gallstone.  Spleen normal.  Fullness and inflammation of the pancreatic head and body low suggestive of pancreatitis but suboptimally evaluated without IV contrast.  Recommend clinical and laboratory correlation for pancreatitis and consider follow-up with contrast enhanced imaging to exclude underlying mass. Adrenal glands normal.  Atrophic cystic kidneys.  IVC filter. Atherosclerotic vascular calcifications.  Bowel negative for obstruction or inflammation.  Large

## 2024-06-03 ENCOUNTER — APPOINTMENT (OUTPATIENT)
Dept: CARDIOLOGY | Facility: HOSPITAL | Age: 69
End: 2024-06-03
Payer: MEDICARE

## 2024-06-03 ENCOUNTER — APPOINTMENT (OUTPATIENT)
Dept: RADIOLOGY | Facility: HOSPITAL | Age: 69
End: 2024-06-03
Payer: MEDICARE

## 2024-06-03 ENCOUNTER — APPOINTMENT (OUTPATIENT)
Age: 69
DRG: 438 | End: 2024-06-03
Attending: INTERNAL MEDICINE
Payer: MEDICARE

## 2024-06-03 ENCOUNTER — HOSPITAL ENCOUNTER (INPATIENT)
Facility: HOSPITAL | Age: 69
LOS: 12 days | Discharge: NURSING, REHAB OR LONG-TERM CARE | End: 2024-06-15
Attending: INTERNAL MEDICINE | Admitting: STUDENT IN AN ORGANIZED HEALTH CARE EDUCATION/TRAINING PROGRAM
Payer: MEDICARE

## 2024-06-03 VITALS
OXYGEN SATURATION: 96 % | RESPIRATION RATE: 20 BRPM | SYSTOLIC BLOOD PRESSURE: 147 MMHG | HEART RATE: 95 BPM | BODY MASS INDEX: 35.63 KG/M2 | WEIGHT: 213.85 LBS | TEMPERATURE: 98.4 F | HEIGHT: 65 IN | DIASTOLIC BLOOD PRESSURE: 86 MMHG

## 2024-06-03 DIAGNOSIS — R60.0 LOCALIZED EDEMA: ICD-10-CM

## 2024-06-03 DIAGNOSIS — R06.03 RESPIRATORY DISTRESS: ICD-10-CM

## 2024-06-03 DIAGNOSIS — Z94.0 KIDNEY TRANSPLANTED (HHS-HCC): ICD-10-CM

## 2024-06-03 DIAGNOSIS — R06.00 DYSPNEA, UNSPECIFIED: ICD-10-CM

## 2024-06-03 DIAGNOSIS — N12 PYELONEPHRITIS: Primary | ICD-10-CM

## 2024-06-03 DIAGNOSIS — J96.01 ACUTE RESPIRATORY FAILURE WITH HYPOXIA (MULTI): ICD-10-CM

## 2024-06-03 DIAGNOSIS — R60.9 EDEMA, UNSPECIFIED TYPE: ICD-10-CM

## 2024-06-03 DIAGNOSIS — I50.22 HEART FAILURE WITH MILDLY REDUCED EJECTION FRACTION (MULTI): ICD-10-CM

## 2024-06-03 DIAGNOSIS — K85.90 ACUTE PANCREATITIS, UNSPECIFIED COMPLICATION STATUS, UNSPECIFIED PANCREATITIS TYPE (HHS-HCC): ICD-10-CM

## 2024-06-03 DIAGNOSIS — G93.41 ACUTE METABOLIC ENCEPHALOPATHY: ICD-10-CM

## 2024-06-03 DIAGNOSIS — R09.02 HYPOXEMIA: ICD-10-CM

## 2024-06-03 LAB
ALBUMIN SERPL BCP-MCNC: 2.8 G/DL (ref 3.4–5)
ALBUMIN SERPL-MCNC: 2.9 G/DL (ref 3.5–5.2)
ALP SERPL-CCNC: 57 U/L (ref 33–136)
ALP SERPL-CCNC: 66 U/L (ref 35–104)
ALT SERPL W P-5'-P-CCNC: 7 U/L (ref 7–45)
ALT SERPL-CCNC: 7 U/L (ref 0–32)
AMMONIA PLAS-SCNC: 28 UMOL/L (ref 16–53)
AMPHETAMINES UR QL SCN: ABNORMAL
ANION GAP BLDA CALCULATED.4IONS-SCNC: 5 MMO/L (ref 10–25)
ANION GAP BLDV CALCULATED.4IONS-SCNC: 8 MMOL/L (ref 10–25)
ANION GAP SERPL CALC-SCNC: 14 MMOL/L (ref 10–20)
ANION GAP SERPL CALCULATED.3IONS-SCNC: 12 MMOL/L (ref 7–16)
APPEARANCE UR: ABNORMAL
AST SERPL W P-5'-P-CCNC: 18 U/L (ref 9–39)
AST SERPL-CCNC: 17 U/L (ref 0–31)
BARBITURATES UR QL SCN: ABNORMAL
BASE EXCESS BLDA CALC-SCNC: 0.9 MMOL/L (ref -2–3)
BASE EXCESS BLDV CALC-SCNC: -1.5 MMOL/L (ref -2–3)
BENZODIAZ UR QL SCN: ABNORMAL
BILIRUB SERPL-MCNC: 0.5 MG/DL (ref 0–1.2)
BILIRUB SERPL-MCNC: 0.6 MG/DL (ref 0–1.2)
BILIRUB UR STRIP.AUTO-MCNC: NEGATIVE MG/DL
BNP SERPL-MCNC: 429 PG/ML (ref 0–99)
BODY TEMPERATURE: 37 DEGREES CELSIUS
BODY TEMPERATURE: ABNORMAL
BUN SERPL-MCNC: 30 MG/DL (ref 6–23)
BUN SERPL-MCNC: 32 MG/DL (ref 6–23)
BZE UR QL SCN: ABNORMAL
CA-I BLDA-SCNC: 1.39 MMOL/L (ref 1.1–1.33)
CA-I BLDV-SCNC: 1.4 MMOL/L (ref 1.1–1.33)
CALCIUM SERPL-MCNC: 10.3 MG/DL (ref 8.6–10.2)
CALCIUM SERPL-MCNC: 9.5 MG/DL (ref 8.6–10.6)
CANNABINOIDS UR QL SCN: ABNORMAL
CARDIAC TROPONIN I PNL SERPL HS: 79 NG/L (ref 0–34)
CHLORIDE BLDA-SCNC: 108 MMOL/L (ref 98–107)
CHLORIDE BLDV-SCNC: 107 MMOL/L (ref 98–107)
CHLORIDE SERPL-SCNC: 108 MMOL/L (ref 98–107)
CHLORIDE SERPL-SCNC: 109 MMOL/L (ref 98–107)
CHLORIDE UR-SCNC: 28 MMOL/L
CHLORIDE/CREATININE (MMOL/G) IN URINE: 31 MMOL/G CREAT (ref 38–318)
CO2 SERPL-SCNC: 22 MMOL/L (ref 22–29)
CO2 SERPL-SCNC: 27 MMOL/L (ref 21–32)
COLOR UR: YELLOW
CREAT SERPL-MCNC: 1.7 MG/DL (ref 0.5–1.05)
CREAT SERPL-MCNC: 1.8 MG/DL (ref 0.5–1)
CREAT UR-MCNC: 91 MG/DL (ref 20–320)
DATE LAST DOSE: NORMAL
EGFRCR SERPLBLD CKD-EPI 2021: 33 ML/MIN/1.73M*2
ERYTHROCYTE [DISTWIDTH] IN BLOOD BY AUTOMATED COUNT: 16.1 % (ref 11.5–15)
FENTANYL+NORFENTANYL UR QL SCN: ABNORMAL
FERRITIN SERPL-MCNC: 458 NG/ML
GFR, ESTIMATED: 30 ML/MIN/1.73M2
GLUCOSE BLD MANUAL STRIP-MCNC: 194 MG/DL (ref 74–99)
GLUCOSE BLD MANUAL STRIP-MCNC: 196 MG/DL (ref 74–99)
GLUCOSE BLD MANUAL STRIP-MCNC: 37 MG/DL (ref 74–99)
GLUCOSE BLD MANUAL STRIP-MCNC: 75 MG/DL (ref 74–99)
GLUCOSE BLDA-MCNC: 236 MG/DL (ref 74–99)
GLUCOSE BLDV-MCNC: 36 MG/DL (ref 74–99)
GLUCOSE SERPL-MCNC: 36 MG/DL (ref 74–99)
GLUCOSE SERPL-MCNC: 44 MG/DL (ref 74–99)
GLUCOSE UR STRIP.AUTO-MCNC: NORMAL MG/DL
GRAN CASTS #/AREA UR COMP ASSIST: ABNORMAL /LPF
HCO3 BLDA-SCNC: 28.2 MMOL/L (ref 22–26)
HCO3 BLDV-SCNC: 27.6 MMOL/L (ref 22–26)
HCT VFR BLD AUTO: 44.9 % (ref 34–48)
HCT VFR BLD EST: 38 % (ref 36–46)
HCT VFR BLD EST: 42 % (ref 36–46)
HGB BLD-MCNC: 13.9 G/DL (ref 11.5–15.5)
HGB BLDA-MCNC: 12.6 G/DL (ref 12–16)
HGB BLDV-MCNC: 14 G/DL (ref 12–16)
HIV 1+2 AB+HIV1 P24 AG SERPL QL IA: NONREACTIVE
HYALINE CASTS #/AREA URNS AUTO: ABNORMAL /LPF
INHALED O2 CONCENTRATION: 21 %
IRON SATN MFR SERPL: 14 % (ref 15–50)
IRON SERPL-MCNC: 22 UG/DL (ref 37–145)
KETONES UR STRIP.AUTO-MCNC: NEGATIVE MG/DL
LACTATE BLDA-SCNC: 0.7 MMOL/L (ref 0.4–2)
LACTATE BLDV-SCNC: 1 MMOL/L (ref 0.4–2)
LEUKOCYTE ESTERASE UR QL STRIP.AUTO: ABNORMAL
MAGNESIUM SERPL-MCNC: 1.3 MG/DL (ref 1.6–2.6)
MAGNESIUM SERPL-MCNC: 1.33 MG/DL (ref 1.6–2.4)
MCH RBC QN AUTO: 35.1 PG (ref 26–35)
MCHC RBC AUTO-ENTMCNC: 31 G/DL (ref 32–34.5)
MCV RBC AUTO: 113.4 FL (ref 80–99.9)
METHADONE UR QL SCN: ABNORMAL
MUCOUS THREADS #/AREA URNS AUTO: ABNORMAL /LPF
NITRITE UR QL STRIP.AUTO: NEGATIVE
OPIATES UR QL SCN: ABNORMAL
OXYCODONE+OXYMORPHONE UR QL SCN: ABNORMAL
OXYHGB MFR BLDA: 95.5 % (ref 94–98)
OXYHGB MFR BLDV: 73 % (ref 45–75)
PCO2 BLDA: 56 MM HG (ref 38–42)
PCO2 BLDV: 66 MM HG (ref 41–51)
PCP UR QL SCN: ABNORMAL
PH BLDA: 7.31 PH (ref 7.38–7.42)
PH BLDV: 7.23 PH (ref 7.33–7.43)
PH UR STRIP.AUTO: 6 [PH]
PHOSPHATE SERPL-MCNC: 2.6 MG/DL (ref 2.5–4.5)
PLATELET, FLUORESCENCE: 121 K/UL (ref 130–450)
PMV BLD AUTO: 13.1 FL (ref 7–12)
PO2 BLDA: 80 MM HG (ref 85–95)
PO2 BLDV: 41 MM HG (ref 35–45)
POTASSIUM BLDA-SCNC: 3.9 MMOL/L (ref 3.5–5.3)
POTASSIUM BLDV-SCNC: 3.8 MMOL/L (ref 3.5–5.3)
POTASSIUM SERPL-SCNC: 4 MMOL/L (ref 3.5–5)
POTASSIUM SERPL-SCNC: 4.2 MMOL/L (ref 3.5–5.3)
POTASSIUM UR-SCNC: 31 MMOL/L
POTASSIUM/CREAT UR-RTO: 34 MMOL/G CREAT
PROCALCITONIN SERPL-MCNC: 0.41 NG/ML
PROT SERPL-MCNC: 5.2 G/DL (ref 6.4–8.2)
PROT SERPL-MCNC: 5.6 G/DL (ref 6.4–8.3)
PROT UR STRIP.AUTO-MCNC: ABNORMAL MG/DL
PROT UR-ACNC: 152 MG/DL (ref 5–25)
PTH-INTACT SERPL-MCNC: 23.1 PG/ML (ref 15–65)
PTH-INTACT SERPL-MCNC: 53.6 PG/ML (ref 18.5–88)
RBC # BLD AUTO: 3.96 M/UL (ref 3.5–5.5)
RBC # UR STRIP.AUTO: ABNORMAL /UL
RBC #/AREA URNS AUTO: ABNORMAL /HPF
SAO2 % BLDA: 98 % (ref 94–100)
SAO2 % BLDV: 75 % (ref 45–75)
SODIUM BLDA-SCNC: 137 MMOL/L (ref 136–145)
SODIUM BLDV-SCNC: 139 MMOL/L (ref 136–145)
SODIUM SERPL-SCNC: 142 MMOL/L (ref 132–146)
SODIUM SERPL-SCNC: 146 MMOL/L (ref 136–145)
SODIUM UR-SCNC: 21 MMOL/L
SODIUM/CREAT UR-RTO: 23 MMOL/G CREAT
SP GR UR STRIP.AUTO: 1.02
TIBC SERPL-MCNC: 162 UG/DL (ref 250–450)
TME LAST DOSE: NORMAL H
TSH SERPL-ACNC: 0.45 MIU/L (ref 0.44–3.98)
URATE SERPL-MCNC: 7.9 MG/DL (ref 2.4–5.7)
UROBILINOGEN UR STRIP.AUTO-MCNC: NORMAL MG/DL
VANCOMYCIN DOSE: NORMAL MG
VANCOMYCIN SERPL-MCNC: 10.5 UG/ML (ref 5–40)
WBC #/AREA URNS AUTO: >50 /HPF
WBC CLUMPS #/AREA URNS AUTO: ABNORMAL /HPF
WBC OTHER # BLD: 20.9 K/UL (ref 4.5–11.5)

## 2024-06-03 PROCEDURE — 2580000003 HC RX 258: Performed by: SPECIALIST

## 2024-06-03 PROCEDURE — 71045 X-RAY EXAM CHEST 1 VIEW: CPT | Performed by: RADIOLOGY

## 2024-06-03 PROCEDURE — 84132 ASSAY OF SERUM POTASSIUM: CPT | Mod: 91

## 2024-06-03 PROCEDURE — 83550 IRON BINDING TEST: CPT

## 2024-06-03 PROCEDURE — 83735 ASSAY OF MAGNESIUM: CPT

## 2024-06-03 PROCEDURE — 1200000002 HC GENERAL ROOM WITH TELEMETRY DAILY

## 2024-06-03 PROCEDURE — 86334 IMMUNOFIX E-PHORESIS SERUM: CPT

## 2024-06-03 PROCEDURE — 80307 DRUG TEST PRSMV CHEM ANLYZR: CPT

## 2024-06-03 PROCEDURE — 71045 X-RAY EXAM CHEST 1 VIEW: CPT

## 2024-06-03 PROCEDURE — 99233 SBSQ HOSP IP/OBS HIGH 50: CPT | Performed by: INTERNAL MEDICINE

## 2024-06-03 PROCEDURE — 84550 ASSAY OF BLOOD/URIC ACID: CPT

## 2024-06-03 PROCEDURE — 84155 ASSAY OF PROTEIN SERUM: CPT

## 2024-06-03 PROCEDURE — 2580000003 HC RX 258: Performed by: FAMILY MEDICINE

## 2024-06-03 PROCEDURE — 84484 ASSAY OF TROPONIN QUANT: CPT

## 2024-06-03 PROCEDURE — 82436 ASSAY OF URINE CHLORIDE: CPT

## 2024-06-03 PROCEDURE — 80053 COMPREHEN METABOLIC PANEL: CPT

## 2024-06-03 PROCEDURE — 87389 HIV-1 AG W/HIV-1&-2 AB AG IA: CPT

## 2024-06-03 PROCEDURE — 2580000003 HC RX 258: Performed by: INTERNAL MEDICINE

## 2024-06-03 PROCEDURE — 84166 PROTEIN E-PHORESIS/URINE/CSF: CPT

## 2024-06-03 PROCEDURE — 87086 URINE CULTURE/COLONY COUNT: CPT

## 2024-06-03 PROCEDURE — 83540 ASSAY OF IRON: CPT

## 2024-06-03 PROCEDURE — 6360000002 HC RX W HCPCS: Performed by: SPECIALIST

## 2024-06-03 PROCEDURE — 83970 ASSAY OF PARATHORMONE: CPT

## 2024-06-03 PROCEDURE — 36415 COLL VENOUS BLD VENIPUNCTURE: CPT

## 2024-06-03 PROCEDURE — 85027 COMPLETE CBC AUTOMATED: CPT

## 2024-06-03 PROCEDURE — 84443 ASSAY THYROID STIM HORMONE: CPT

## 2024-06-03 PROCEDURE — 80158 DRUG ASSAY CYCLOSPORINE: CPT

## 2024-06-03 PROCEDURE — 99239 HOSP IP/OBS DSCHRG MGMT >30: CPT | Performed by: INTERNAL MEDICINE

## 2024-06-03 PROCEDURE — 84145 PROCALCITONIN (PCT): CPT

## 2024-06-03 PROCEDURE — 81001 URINALYSIS AUTO W/SCOPE: CPT

## 2024-06-03 PROCEDURE — 82140 ASSAY OF AMMONIA: CPT

## 2024-06-03 PROCEDURE — 74018 RADEX ABDOMEN 1 VIEW: CPT | Performed by: RADIOLOGY

## 2024-06-03 PROCEDURE — 80202 ASSAY OF VANCOMYCIN: CPT

## 2024-06-03 PROCEDURE — 84100 ASSAY OF PHOSPHORUS: CPT

## 2024-06-03 PROCEDURE — 6370000000 HC RX 637 (ALT 250 FOR IP): Performed by: FAMILY MEDICINE

## 2024-06-03 PROCEDURE — 74018 RADEX ABDOMEN 1 VIEW: CPT

## 2024-06-03 PROCEDURE — 2500000004 HC RX 250 GENERAL PHARMACY W/ HCPCS (ALT 636 FOR OP/ED)

## 2024-06-03 PROCEDURE — 6360000002 HC RX W HCPCS: Performed by: INTERNAL MEDICINE

## 2024-06-03 PROCEDURE — 2700000000 HC OXYGEN THERAPY PER DAY

## 2024-06-03 PROCEDURE — 82728 ASSAY OF FERRITIN: CPT

## 2024-06-03 PROCEDURE — 82947 ASSAY GLUCOSE BLOOD QUANT: CPT | Mod: 91

## 2024-06-03 PROCEDURE — 93005 ELECTROCARDIOGRAM TRACING: CPT

## 2024-06-03 PROCEDURE — 83880 ASSAY OF NATRIURETIC PEPTIDE: CPT

## 2024-06-03 PROCEDURE — 6370000000 HC RX 637 (ALT 250 FOR IP): Performed by: INTERNAL MEDICINE

## 2024-06-03 PROCEDURE — 87040 BLOOD CULTURE FOR BACTERIA: CPT

## 2024-06-03 PROCEDURE — 6360000002 HC RX W HCPCS: Performed by: FAMILY MEDICINE

## 2024-06-03 PROCEDURE — 2500000005 HC RX 250 GENERAL PHARMACY W/O HCPCS

## 2024-06-03 PROCEDURE — 84165 PROTEIN E-PHORESIS SERUM: CPT

## 2024-06-03 RX ORDER — GENTAMICIN SULFATE 80 MG/50ML
80 INJECTION, SOLUTION INTRAVENOUS ONCE
Status: COMPLETED | OUTPATIENT
Start: 2024-06-03 | End: 2024-06-03

## 2024-06-03 RX ORDER — MAGNESIUM SULFATE IN WATER 40 MG/ML
2000 INJECTION, SOLUTION INTRAVENOUS ONCE
Status: DISCONTINUED | OUTPATIENT
Start: 2024-06-03 | End: 2024-06-03 | Stop reason: HOSPADM

## 2024-06-03 RX ORDER — MEROPENEM 1 G/1
1 INJECTION, POWDER, FOR SOLUTION INTRAVENOUS ONCE
Status: COMPLETED | OUTPATIENT
Start: 2024-06-03 | End: 2024-06-03

## 2024-06-03 RX ORDER — DEXTROSE 50 % IN WATER (D50W) INTRAVENOUS SYRINGE
Status: COMPLETED
Start: 2024-06-03 | End: 2024-06-03

## 2024-06-03 RX ORDER — DEXTROSE 50 % IN WATER (D50W) INTRAVENOUS SYRINGE
25 ONCE
Status: COMPLETED | OUTPATIENT
Start: 2024-06-04 | End: 2024-06-03

## 2024-06-03 RX ORDER — SODIUM CHLORIDE 9 MG/ML
150 INJECTION, SOLUTION INTRAVENOUS CONTINUOUS
Status: DISCONTINUED | OUTPATIENT
Start: 2024-06-04 | End: 2024-06-04

## 2024-06-03 RX ORDER — VANCOMYCIN HYDROCHLORIDE 1 G/20ML
INJECTION, POWDER, LYOPHILIZED, FOR SOLUTION INTRAVENOUS DAILY PRN
Status: DISCONTINUED | OUTPATIENT
Start: 2024-06-03 | End: 2024-06-06

## 2024-06-03 RX ORDER — DEXTROSE 50 % IN WATER (D50W) INTRAVENOUS SYRINGE
25 ONCE
Status: COMPLETED | OUTPATIENT
Start: 2024-06-03 | End: 2024-06-03

## 2024-06-03 RX ORDER — MEROPENEM 1 G/1
1 INJECTION, POWDER, FOR SOLUTION INTRAVENOUS EVERY 12 HOURS
Status: DISCONTINUED | OUTPATIENT
Start: 2024-06-04 | End: 2024-06-11

## 2024-06-03 RX ORDER — MAGNESIUM SULFATE HEPTAHYDRATE 40 MG/ML
4 INJECTION, SOLUTION INTRAVENOUS ONCE
Status: COMPLETED | OUTPATIENT
Start: 2024-06-04 | End: 2024-06-04

## 2024-06-03 RX ORDER — MAGNESIUM SULFATE HEPTAHYDRATE 40 MG/ML
2 INJECTION, SOLUTION INTRAVENOUS ONCE
Status: COMPLETED | OUTPATIENT
Start: 2024-06-04 | End: 2024-06-04

## 2024-06-03 RX ADMIN — METOPROLOL TARTRATE 25 MG: 25 TABLET, FILM COATED ORAL at 09:08

## 2024-06-03 RX ADMIN — LEVOTHYROXINE SODIUM 50 MCG: 0.05 TABLET ORAL at 05:54

## 2024-06-03 RX ADMIN — VANCOMYCIN HYDROCHLORIDE 1000 MG: 1 INJECTION, POWDER, LYOPHILIZED, FOR SOLUTION INTRAVENOUS at 15:19

## 2024-06-03 RX ADMIN — MYCOPHENOLATE MOFETIL 1000 MG: 250 CAPSULE ORAL at 09:08

## 2024-06-03 RX ADMIN — FERROUS SULFATE TAB 325 MG (65 MG ELEMENTAL FE) 325 MG: 325 (65 FE) TAB at 09:08

## 2024-06-03 RX ADMIN — PREDNISONE 5 MG: 5 TABLET ORAL at 09:08

## 2024-06-03 RX ADMIN — CALCITONIN SALMON 400 UNITS: 200 INJECTION, SOLUTION INTRAMUSCULAR; SUBCUTANEOUS at 05:24

## 2024-06-03 RX ADMIN — DEXTROSE MONOHYDRATE 25 G: 25 INJECTION, SOLUTION INTRAVENOUS at 23:00

## 2024-06-03 RX ADMIN — FOLIC ACID 1 MG: 1 TABLET ORAL at 09:08

## 2024-06-03 RX ADMIN — MEROPENEM 1 G: 1 INJECTION, POWDER, FOR SOLUTION INTRAVENOUS at 23:29

## 2024-06-03 RX ADMIN — Medication 125 MG: at 13:00

## 2024-06-03 RX ADMIN — DEXTROSE MONOHYDRATE 25 G: 25 INJECTION, SOLUTION INTRAVENOUS at 21:40

## 2024-06-03 RX ADMIN — SODIUM CHLORIDE 150 ML/HR: 9 INJECTION, SOLUTION INTRAVENOUS at 23:48

## 2024-06-03 RX ADMIN — DOCUSATE SODIUM 200 MG: 100 CAPSULE, LIQUID FILLED ORAL at 09:08

## 2024-06-03 RX ADMIN — MEROPENEM 1000 MG: 1 INJECTION INTRAVENOUS at 01:11

## 2024-06-03 RX ADMIN — ATORVASTATIN CALCIUM 20 MG: 20 TABLET, FILM COATED ORAL at 09:08

## 2024-06-03 RX ADMIN — DEXTROSE AND SODIUM CHLORIDE: 5; 900 INJECTION, SOLUTION INTRAVENOUS at 02:06

## 2024-06-03 RX ADMIN — MEROPENEM 1000 MG: 1 INJECTION INTRAVENOUS at 12:59

## 2024-06-03 RX ADMIN — Medication 10 ML: at 09:10

## 2024-06-03 RX ADMIN — DEXTROSE 50 % IN WATER (D50W) INTRAVENOUS SYRINGE 25 G: at 21:40

## 2024-06-03 RX ADMIN — APIXABAN 5 MG: 5 TABLET, FILM COATED ORAL at 09:07

## 2024-06-03 RX ADMIN — GENTAMICIN SULFATE 80 MG: 80 INJECTION, SOLUTION INTRAVENOUS at 17:24

## 2024-06-03 RX ADMIN — SODIUM CHLORIDE, POTASSIUM CHLORIDE, SODIUM LACTATE AND CALCIUM CHLORIDE 1000 ML: 600; 310; 30; 20 INJECTION, SOLUTION INTRAVENOUS at 23:39

## 2024-06-03 RX ADMIN — VENLAFAXINE HYDROCHLORIDE 150 MG: 150 CAPSULE, EXTENDED RELEASE ORAL at 09:07

## 2024-06-03 ASSESSMENT — PAIN SCALES - GENERAL: PAINLEVEL_OUTOF10: 0 - NO PAIN

## 2024-06-03 ASSESSMENT — ACTIVITIES OF DAILY LIVING (ADL)
FEEDING YOURSELF: NEEDS ASSISTANCE
DRESSING YOURSELF: NEEDS ASSISTANCE
ADEQUATE_TO_COMPLETE_ADL: UNABLE TO ASSESS
ASSISTIVE_DEVICE: OTHER (COMMENT)
HEARING - LEFT EAR: FUNCTIONAL
TOILETING: NEEDS ASSISTANCE
GROOMING: NEEDS ASSISTANCE
BATHING: NEEDS ASSISTANCE
PATIENT'S MEMORY ADEQUATE TO SAFELY COMPLETE DAILY ACTIVITIES?: UNABLE TO ASSESS
WALKS IN HOME: UNABLE TO ASSESS
JUDGMENT_ADEQUATE_SAFELY_COMPLETE_DAILY_ACTIVITIES: UNABLE TO ASSESS
HEARING - RIGHT EAR: FUNCTIONAL

## 2024-06-03 ASSESSMENT — PAIN - FUNCTIONAL ASSESSMENT: PAIN_FUNCTIONAL_ASSESSMENT: 0-10

## 2024-06-03 ASSESSMENT — COGNITIVE AND FUNCTIONAL STATUS - GENERAL: PATIENT BASELINE BEDBOUND: UNABLE TO ASSESS AT THIS TIME

## 2024-06-03 NOTE — CARE COORDINATION
Case Management Assessment  Initial Evaluation    Date/Time of Evaluation: 6/3/2024 3:11 PM  Assessment Completed by: AG Vera    If patient is discharged prior to next notation, then this note serves as note for discharge by case management.    Patient Name: Natali Wolff                   YOB: 1955  Diagnosis: Hypercalcemia [E83.52]  Calculus of ureter [N20.1]  LBBB (left bundle branch block) [I44.7]  History of renal transplant [Z94.0]  Drug-induced acute pancreatitis without infection or necrosis [K85.30]  Acute pancreatitis without infection or necrosis, unspecified pancreatitis type [K85.90]  Acute renal failure superimposed on chronic kidney disease, unspecified acute renal failure type, unspecified CKD stage (HCC) [N17.9, N18.9]                   Date / Time: 6/1/2024 12:45 PM    Patient Admission Status: Inpatient   Readmission Risk (Low < 19, Mod (19-27), High > 27): Readmission Risk Score: 19.8    Current PCP: Brennon Malave, DO  PCP verified by CM? Yes    Chart Reviewed: Yes      History Provided by: Medical Record, Patient  Patient Orientation: Alert and Oriented, Person, Place    Patient Cognition: Other (see comment) (some confusion noted at this time.)    Hospitalization in the last 30 days (Readmission):  No    If yes, Readmission Assessment in CM Navigator will be completed.    Advance Directives:      Code Status: Full Code   Patient's Primary Decision Maker is: Legal Next of Kin    Primary Decision Maker: Mayur Cheema - Child - [phone]    Secondary Decision Maker: Gita Russell - Other Relative - [phone]    Discharge Planning:    Patient lives with: Alone Type of Home: House  Primary Care Giver: Self  Patient Support Systems include: Children   Current Financial resources:    Current community resources:    Current services prior to admission: None            Current DME:              Type of Home Care services:  None    ADLS  Prior functional level:  where she receives it from. This is not accurate. Awaiting bed for transfer to [facility] due to hx kidney transplant. IV Merrem, po Vanco PTA. Sw will follow for further dc planning.       AG Vera  Case Management Department  Ph:  Fax:

## 2024-06-03 NOTE — PROGRESS NOTES
[facility]  MD Handy Ferro, MD Jovana Ardon, CNP   Soumya Aviles, ZAIRE Woodard, CNP  Progress Note    6/3/2024    SUBJECTIVE:   6/3: Seen this morning.  Low back pain abdominal discomfort no nausea or vomiting.  No chest pain or palpitation.  No shortness of breath at rest supine on room air.  Appetite okay.      PROBLEM LIST:    Principal Problem:    Drug-induced acute pancreatitis without infection or necrosis  Active Problems:    Severe obesity (BMI 35.0-39.9) with comorbidity (HCC)    Mixed hyperlipidemia    Acquired hypothyroidism    Kidney transplanted    Hypertension    Acute renal failure with acute renal cortical necrosis superimposed on stage 4 chronic kidney disease (HCC)    Other emphysema (HCC)    Hypercalcemia  Resolved Problems:    * No resolved hospital problems. *         DIET:    Diet NPO     MEDS (scheduled):    meropenem  1,000 mg IntraVENous Q12H    gentamicin  80 mg IntraVENous Once    vancomycin  125 mg Oral Q MWF    venlafaxine  150 mg Oral Daily with breakfast    vancomycin (VANCOCIN) intermittent dosing (placeholder)   Other RX Placeholder    metoprolol tartrate  25 mg Oral BID    apixaban  5 mg Oral BID    cloZAPine  50 mg Oral Nightly    docusate sodium  200 mg Oral BID    ferrous sulfate  325 mg Oral Daily with breakfast    folic acid  1 mg Oral Daily    levothyroxine  50 mcg Oral Daily    predniSONE  5 mg Oral Daily    atorvastatin  20 mg Oral Daily    sodium chloride flush  5-40 mL IntraVENous 2 times per day    mycophenolate  1,000 mg Oral BID       MEDS (infusions):   dextrose 5 % and 0.9 % NaCl 100 mL/hr at 06/03/24 0206    sodium chloride         MEDS (prn):  ipratropium 0.5 mg-albuterol 2.5 mg, perflutren lipid microspheres, iopamidol, prochlorperazine, sodium chloride flush, sodium chloride, acetaminophen **OR** acetaminophen    PHYSICAL EXAM:     Patient Vitals for the past 24 hrs:   BP Temp Temp src Pulse  Resp SpO2   06/03/24 1328 (!) 147/86 98.4 °F (36.9 °C) Oral 95 20 96 %   06/03/24 0645 100/86 98.3 °F (36.8 °C) Oral 96 22 90 %   06/02/24 2302 (!) 153/77 97 °F (36.1 °C) Temporal 94 18 94 %   06/02/24 1936 (!) 140/65 97.5 °F (36.4 °C) Infrared 91 20 90 %   @      Intake/Output Summary (Last 24 hours) at 6/3/2024 1753  Last data filed at 6/3/2024 1311  Gross per 24 hour   Intake 0 ml   Output 700 ml   Net -700 ml         Wt Readings from Last 3 Encounters:   06/01/24 97 kg (213 lb 13.5 oz)   04/03/24 95.7 kg (211 lb)   03/05/24 96.2 kg (212 lb)       Constitutional:  in no acute distress  HEENT: NC/AT, EOMI, sclera and conjunctiva are clear and anicteric, mucus membranes moist  Neck: Trachea midline, no JVD  Cardiovascular: S1, S2 regular rhythm, no murmur,or rub  Respiratory:  No crackles, no wheeze  Gastrointestinal:  Soft, nontender, nondistended, NABS  Ext: no edema, feet warm  Skin: dry, no rash  Neuro: awake, alert, interactive      DATA:    Recent Labs     06/01/24  1415 06/02/24  1035 06/03/24  1053   WBC 15.2* 18.3* 20.9*   HGB 15.0 14.3 13.9   HCT 45.6 44.7 44.9   .8* 112.0* 113.4*     Recent Labs     06/01/24  1200 06/01/24  1415 06/01/24  2047 06/02/24  1034 06/03/24  1053   * 132 135 137 142   K 4.1 4.4 4.0 4.9 4.0   CL 88* 90* 97* 102 108*   CO2 28 25 25 20* 22   MG 1.6 1.6  --  1.3* 1.3*   PHOS 3.8  --   --  4.1 2.6   BUN 40* 40* 38* 37* 32*   CREATININE 2.6* 2.7* 2.5* 2.4* 1.8*   ALT  --  9  --  7 7   AST  --  18  --  15 17   BILITOT  --  0.5  --  0.5 0.5   ALKPHOS  --  70  --  49 66       Lab Results   Component Value Date    LABPROT 0.2 03/12/2022    LABPROT 0.2 03/12/2022       Assessment  1.  Status post nonliving donor kidney transplant 2016 on MMF, prednisone.  No Bactrim     2.  Acute kidney injury, hemodynamically mediated, likely prerenal azotemia in setting of volume contraction, fluid sequestration due to acute pancreatitis, nausea, poor intake, hypotension.  Nonobstructing

## 2024-06-03 NOTE — PROGRESS NOTES
Pharmacy Consultation Note  (Antibiotic Dosing and Monitoring)    Initial consult date: 06/02/2024  Consulting physician/provider: Dr Kateryna Escalante  Drug: Vancomycin  Indication: UTI    Age/  Gender Height Weight IBW  Allergy Information   69 y.o./female 165.1 cm (5' 5\") 97.1 kg (214 lb)     Ideal body weight: 57 kg (125 lb 10.6 oz)  Adjusted ideal body weight: 73 kg (160 lb 15 oz)   Cipro [ciprofloxacin hcl], Macrodantin [nitrofurantoin macrocrystal], and Sulfa antibiotics      Renal Function:  Recent Labs     06/01/24  2047 06/02/24  1034 06/03/24  1053   BUN 38* 37* 32*   CREATININE 2.5* 2.4* 1.8*       Intake/Output Summary (Last 24 hours) at 6/3/2024 1418  Last data filed at 6/3/2024 1311  Gross per 24 hour   Intake 86.11 ml   Output 700 ml   Net -613.89 ml     Vancomycin Monitoring:  Trough:  No results for input(s): \"VANCOTROUGH\" in the last 72 hours.  Random:    Recent Labs     06/03/24  1053   VANCORANDOM 10.5       Recent vancomycin administrations                     vancomycin (VANCOCIN) 1,500 mg in sodium chloride 0.9 % 250 mL IVPB (mg) 1,500 mg New Bag 06/01/24 2302             Assessment:  Patient is a 69 y.o. female who has been initiated on vancomycin  Estimated Creatinine Clearance: 34 mL/min (A) (based on SCr of 1.8 mg/dL (H)).  Baseline SCr ~ 1.5-1.6 mg/dL  To dose vancomycin, pharmacy will be utilizing dosing based off of levels because of patient's renal impairment/insufficiency  6/3: Random AM level = 10.5 mcg/mL    Plan:  Vancomycin 1000 mg IV x 1  Random level tomorrow with morning labs  Will continue to monitor renal function  Pharmacy to follow    Meme Knight, PharmD, BCPS 6/3/2024 2:18 PM   Ext: [phone]    SJW: [phone]

## 2024-06-03 NOTE — PROGRESS NOTES
NAME: Natali Wolff  MR:  [id number]  :   1955  Admit Date:  2024    Elements of this note, were copied and pasted from Previous. Updates have been made where noted and reflect current exam and medical decision making from the DOS of this encounter.  CHIEF COMPLAINT       Chief Complaint   Patient presents with    Dysuria     Burning urination with abdominal pain for 2-3 days with nausea     HISTORY OF PRESENT ILLNESS     Natali Wolff is a 69 y.o. female admitted on 2024 and has  has a past medical history of Abnormal EKG, Acute cystitis without hematuria, Acute cystitis without hematuria, Acute exacerbation of chronic obstructive pulmonary disease (COPD) (HCC), Acute gout involving toe of right foot, Acute on chronic combined systolic (congestive) and diastolic (congestive) heart failure (HCC), asthmatic bronchitis, Bipolar disorder (HCC), Cancer (HCC), Cerebrovascular disease, Clotting disorder (HCC), Depression, Hemodialysis patient (HCC), History of blood transfusion, History of renal transplant, Hyperlipidemia, Hypertension, Hypothyroidism, Hypothyroidism, Leg swelling, Leukocytosis, Lymphedema of both lower extremities, Other emphysema (HCC), Paroxysmal atrial fibrillation (HCC), Peripheral vascular disease (HCC), Renal failure, Sepsis (HCC), Thrombophlebitis, and Thyroid disease.     This is a face to face encounter   24 pt is stil groggy but responds s    Patient is tolerating medications. No reported adverse drug reactions.  Available labs, imaging studies, microbiologic studies have been reviewed with pt and family if present.  Assessment & Plan     Admitted for   Hypercalcemia [E83.52]  Calculus of ureter [N20.1]  LBBB (left bundle branch block) [I44.7]  History of renal transplant [Z94.0]  Drug-induced acute pancreatitis without infection or necrosis [K85.30]  Acute pancreatitis without infection or necrosis, unspecified pancreatitis type [K85.90]  Acute renal    Dressing Type Transparent 06/03/24 1608   Dressing Intervention Other (Comment) 06/02/24 0400     Hemodialysis Catheter:     Drain(s):  Urinary Catheter 06/03/24 2 Way (Active)   Catheter Indications Need for fluid volume management of the critically ill patient in a critical care setting 06/03/24 0800   Site Assessment [place] 06/03/24 0800   Catheter Care  Soap and water 06/03/24 0715       CURRENT MEDICATIONS     Current Facility-Administered Medications:     meropenem (MERREM) 1,000 mg in sodium chloride 0.9 % 100 mL IVPB ([id number]), 1,000 mg, IntraVENous, Q12H, Pushpa Wright MD, Last Rate: 25 mL/hr at 06/03/24 1259, 1,000 mg at 06/03/24 1259    vancomycin (VANCOCIN) 1,000 mg in sodium chloride 0.9 % 250 mL IVPB ([id number]), 1,000 mg, IntraVENous, Once, Kateryna Escalante DO, Last Rate: 250 mL/hr at 06/03/24 1519, 1,000 mg at 06/03/24 1519    vancomycin (VANCOCIN) 50 mg/mL oral solution 125 mg, 125 mg, Oral, Q MWF, Kateryna Escalante, , 125 mg at 06/03/24 1300    venlafaxine (EFFEXOR XR) extended release capsule 150 mg, 150 mg, Oral, Daily with breakfast, Kateryna Escalante DO, 150 mg at 06/03/24 0907    ipratropium 0.5 mg-albuterol 2.5 mg (DUONEB) nebulizer solution 1 Dose, 1 Dose, Inhalation, Q4H PRN, Kateryna Escalante DO    vancomycin (VANCOCIN) intermittent dosing (placeholder), , Other, RX Placeholder, Kateryna Escalante DO    dextrose 5 % and 0.9 % sodium chloride infusion, , IntraVENous, Continuous, Handy Latham MD, Last Rate: 100 mL/hr at 06/03/24 0206, New Bag at 06/03/24 0206    metoprolol tartrate (LOPRESSOR) tablet 25 mg, 25 mg, Oral, BID, Rafa Renteria MD, 25 mg at 06/03/24 0908    perflutren lipid microspheres (DEFINITY) injection 1.5 mL, 1.5 mL, IntraVENous, ONCE PRN, Rafa Renteria MD    iopamidol (ISOVUE-370) 76 % injection 70 mL, 70 mL, IntraVENous, ONCE PRN, Russell Cage MD    apixaban (ELIQUIS) tablet 5 mg, 5 mg, Oral, BID, Kateryna Escalante DO, 5 mg at 06/03/24 0990     cloZAPine (CLOZARIL) tablet 50 mg, 50 mg, Oral, Nightly, Kateryna Escalante H, DO, 50 mg at 06/02/24 2112    docusate sodium (COLACE) capsule 200 mg, 200 mg, Oral, BID, Kateryna Escalante H, DO, 200 mg at 06/03/24 0908    ferrous sulfate (IRON 325) tablet 325 mg, 325 mg, Oral, Daily with breakfast, Kateryna Escalante H, DO, 325 mg at 06/03/24 0908    folic acid (FOLVITE) tablet 1 mg, 1 mg, Oral, Daily, Kateryna Escalante H, DO, 1 mg at 06/03/24 0908    levothyroxine (SYNTHROID) tablet 50 mcg, 50 mcg, Oral, Daily, Kateryna Escalante, DO, 50 mcg at 06/03/24 0554    predniSONE (DELTASONE) tablet 5 mg, 5 mg, Oral, Daily, Kateryna Escalante H, DO, 5 mg at 06/03/24 0908    atorvastatin (LIPITOR) tablet 20 mg, 20 mg, Oral, Daily, Kateryna Escalante H, DO, 20 mg at 06/03/24 0908    prochlorperazine (COMPAZINE) injection 10 mg, 10 mg, IntraVENous, Q6H PRN, Kateryna Escalante H, DO    sodium chloride flush 0.9 % injection 5-40 mL, 5-40 mL, IntraVENous, 2 times per day, Kateryna Escalante H, DO, 10 mL at 06/03/24 0910    sodium chloride flush 0.9 % injection 5-40 mL, 5-40 mL, IntraVENous, PRN, Kateryna Escalante H, DO    0.9 % sodium chloride infusion, , IntraVENous, PRN, Kateryna Escalante H, DO    acetaminophen (TYLENOL) tablet 650 mg, 650 mg, Oral, Q6H PRN, 650 mg at 06/02/24 0841 **OR** acetaminophen (TYLENOL) suppository 650 mg, 650 mg, Rectal, Q6H PRN, Kateryna Escalante H, DO    mycophenolate (CELLCEPT) capsule 1,000 mg, 1,000 mg, Oral, BID, Kateryna Escalante H, DO, 1,000 mg at 06/03/24 0908  DIAGNOSTIC RESULTS     Results       Procedure Component Value Units Date/Time    Culture, Blood 1 [[id number]] Collected: 06/01/24 1539    Order Status: Completed Specimen: Blood Updated: 06/02/24 2310     Specimen Description .BLOOD     Special Requests          Culture NO GROWTH 1 DAY    Culture, Blood 2 [[id number]] Collected: 06/01/24 1539    Order Status: Completed Specimen: Blood Updated: 06/02/24 2311     Specimen Description .BLOOD     Special Requests

## 2024-06-03 NOTE — CONSULTS
[facility]  MD Handy Ferro, MD Jovana Ardon, CNP   Soumya Aviles, ZAIRE Woodard, RAHAT  Consultation  6/2/2024    Thank you for consult  Full note dictated, to follow  Briefly, 69 y.o. woman known to service, history of ESRD status post kidney transplant 2016, mycophenolate,  prednisone, immunosuppression.    Assessment:  1.  Status post nonliving donor kidney transplant 2016 on MMF, prednisone.  No Bactrim    2.  Acute kidney injury, hemodynamically mediated, likely prerenal azotemia in setting of volume contraction, fluid sequestration due to acute pancreatitis, nausea, poor intake, hypotension.  Nonobstructing stone in allograft not likely the etiology.  Urine studies ordered, not yet sent.  Azotemia improving with IV fluid resuscitation    3.  Chronic kidney disease, stage IIIb, baseline creatinine 1.4 to 1.7 mg/dL    4.  History of recurrent urinary tract infections optimistic, well-known to the ID service.  Gas in collecting system suggestive of gas producing bacteria    5.  Hypercalcemia, suppressed intact PTH, has a recent history of elevated PTH with mildly elevated calcium consistent with primary hyperparathyroidism evaluation at that time unremarkable.  Given severity, was treated with [place] calcitonin 4 units/kg subcutaneously and high-volume IV normal saline drip with satisfactory response so far.      Recommendations  Continue IV fluid resuscitation, rate reduced  Continue current immunosuppressive regimen  Send urine studies  Hypercalcemia evaluation including PTH-RP, SPEP, UPEP, 25-hydroxy vitamin D3, 1, 25 dihydroxy vitamin D3  Further evaluation to follow  Consider sestamibi parathyroid scan/parathyroid ultrasound, though will look to see whether evaluation has been performed as an outpatient more recently and 2019  Continue supportive care  Follow labs, UO    Appreciate Dr. Rodriguez's (urology) input    Handy Latham,

## 2024-06-03 NOTE — PLAN OF CARE
Problem: Discharge Planning  Goal: Discharge to home or other facility with appropriate resources  6/3/2024 0233 by Kevin Zaragoza RN  Outcome: Progressing  6/2/2024 1847 by Adalgisa Youssef RN  Outcome: Progressing     Problem: Pain  Goal: Verbalizes/displays adequate comfort level or baseline comfort level  6/3/2024 0233 by Kevin Zaragoza RN  Outcome: Progressing  6/2/2024 1847 by Adalgisa Youssef RN  Outcome: Progressing     Problem: Skin/Tissue Integrity  Goal: Absence of new skin breakdown  Description: 1.  Monitor for areas of redness and/or skin breakdown  2.  Assess vascular access sites hourly  3.  Every 4-6 hours minimum:  Change oxygen saturation probe site  4.  Every 4-6 hours:  If on nasal continuous positive airway pressure, respiratory therapy assess nares and determine need for appliance change or resting period.  6/3/2024 0233 by Kevin Zaragoza RN  Outcome: Progressing  6/2/2024 1847 by Adalgisa Youssef RN  Outcome: Progressing     Problem: Safety - Adult  Goal: Free from fall injury  6/3/2024 0233 by Kevin Zaragoza RN  Outcome: Progressing  6/2/2024 1847 by Adalgisa Youssef RN  Outcome: Progressing

## 2024-06-03 NOTE — PROGRESS NOTES
6/3/2024 10:09 AM  Natali Wolff  [id number]    Subjective:      Laying in bed   She is miserable   Having R flank and abdominal pain   Awaiting transfer to [facility] with yellow urine   No fevers      Review of Systems  Constitutional: No fever or chills   Respiratory: negative for cough and hemoptysis  Cardiovascular: negative for chest pain and dyspnea  Gastrointestinal: + abdominal pain   : See above  Derm: negative for rash and skin lesion(s)  Neurological: negative for seizures and tremors  Musculoskeletal: Negative    Psychiatric: Negative   All other reviews are negative      Scheduled Meds:   meropenem  1,000 mg IntraVENous Q12H    vancomycin  125 mg Oral Q MWF    venlafaxine  150 mg Oral Daily with breakfast    vancomycin (VANCOCIN) intermittent dosing (placeholder)   Other RX Placeholder    metoprolol tartrate  25 mg Oral BID    apixaban  5 mg Oral BID    cloZAPine  50 mg Oral Nightly    docusate sodium  200 mg Oral BID    ferrous sulfate  325 mg Oral Daily with breakfast    folic acid  1 mg Oral Daily    levothyroxine  50 mcg Oral Daily    predniSONE  5 mg Oral Daily    atorvastatin  20 mg Oral Daily    sodium chloride flush  5-40 mL IntraVENous 2 times per day    mycophenolate  1,000 mg Oral BID       Objective:  Vitals:    06/03/24 0645   BP: 100/86   Pulse: 96   Resp: 22   Temp: 98.3 °F (36.8 °C)   SpO2: 90%         Allergies: Cipro [ciprofloxacin hcl], Macrodantin [nitrofurantoin macrocrystal], and Sulfa antibiotics    General Appearance: alert and oriented to person, place and time   Skin: no rash or erythema  Head: normocephalic and atraumatic  Pulmonary/Chest: normal air movement, no respiratory distress  Abdomen: soft, tender   Genitourinary: hassan, draining clear yellow urine   Extremities: no cyanosis, clubbing or edema         Labs:     Recent Labs     06/02/24  1034      K 4.9      CO2 20*   BUN 37*   CREATININE 2.4*   GLUCOSE 86   CALCIUM 11.3*       Lab Results

## 2024-06-03 NOTE — PROGRESS NOTES
INPATIENT CARDIOLOGY CONSULT    Name: Natali Wolff    Age: 69 y.o.    Date of Admission: 6/1/2024 12:45 PM    Date of Service: 6/3/2024    Reason for Consultation:   Chief Complaint   Patient presents with    Dysuria     Burning urination with abdominal pain for 2-3 days with nausea          Referring Physician: Kateryna Escalante DO    History of Present Illness: 69-year-old female with history of heart failure with reduced EF, history of paroxysmal atrial fibrillation, chronic left bundle amado block, hypertension, hyperlipidemia, peripheral vascular disease, chronic kidney disease status post renal transplant 2015 at , history of DVT has been on Eliquis, hypothyroidism, prior tobacco abuse, history of gout and obesity who is hospitalized for acute pancreatitis and hypercalcemia as well as UTI and cardiology consulted for nonsustained VT.      Interim:  Denies chest pain or shortness of breath      Past Medical History:  Past Medical History:   Diagnosis Date    Abnormal EKG     left bundle branch block    Acute cystitis without hematuria 01/20/2023    Acute cystitis without hematuria 01/20/2023    Acute exacerbation of chronic obstructive pulmonary disease (COPD) ([facility]) 05/19/2022    Acute gout involving toe of right foot 09/03/2020    Acute on chronic combined systolic (congestive) and diastolic (congestive) heart failure ([facility])     asthmatic bronchitis 03/07/2019    Bipolar disorder ([facility]) 11/26/2018    Cancer ([facility])     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    Cerebrovascular disease     Clotting disorder (HCC) 1985    thrombophlebitis after c section delivery    Depression     15 years followed by dr zuluaga    Hemodialysis patient ([facility])     History of blood transfusion     History of renal transplant 10/2015     in [place] Dr Oscar Wilkinson    Hyperlipidemia     Hypertension     Hypothyroidism     Hypothyroidism 11/26/2018    Leg swelling 09/03/2020    Leukocytosis 01/10/2022  10 mg  10 mg IntraVENous Q6H PRN Kateryna Escalante, DO        sodium chloride flush 0.9 % injection 5-40 mL  5-40 mL IntraVENous 2 times per day Kateryna Escalante, DO   10 mL at 06/03/24 0910    sodium chloride flush 0.9 % injection 5-40 mL  5-40 mL IntraVENous PRN Kateryna Escalante, DO        0.9 % sodium chloride infusion   IntraVENous PRN Kateryna Escalante, DO        acetaminophen (TYLENOL) tablet 650 mg  650 mg Oral Q6H PRN Kateryna Escalante, DO   650 mg at 06/02/24 0841    Or    acetaminophen (TYLENOL) suppository 650 mg  650 mg Rectal Q6H PRN Kateryna Escalante, DO        mycophenolate (CELLCEPT) capsule 1,000 mg  1,000 mg Oral BID Kateryna Escalante, DO   1,000 mg at 06/03/24 0908      dextrose 5 % and 0.9 % NaCl 100 mL/hr at 06/03/24 0206    sodium chloride             Review of Systems:   Cardiac: As per HPI  General: Denies fever or chills  Pulmonary: As per HPI  HEENT: Denies runny nose  GI: No complaints  : No complaints  Endocrine: Denies night sweats  Musculoskeletal: No complaints  Skin: Dry skin  Neuro: No complaints  Psych: Denies depression    Physical Exam:  BP (!) 147/86   Pulse 95   Temp 98.4 °F (36.9 °C) (Oral)   Resp 20   Ht 1.651 m (5' 5\")   Wt 97 kg (213 lb 13.5 oz)   SpO2 96%   BMI 35.59 kg/m²   Wt Readings from Last 3 Encounters:   06/01/24 97 kg (213 lb 13.5 oz)   04/03/24 95.7 kg (211 lb)   03/05/24 96.2 kg (212 lb)       Appearance: Alert and oriented x3 not in acute distress.  Skin: Dry skin  Head: Atraumatic  Eyes: Intact extraocular muscles   ENMT: Mucous membranes are moist  Neck: Supple  Lungs: Clear to auscultation  Cardiac: Normal S1 and S2  Abdomen: Protuberant  Extremities: Intact range of motion.  Asymmetric lower extremity edema with right leg bigger than the left  Neurologic: No focal neurological deficits  Peripheral Pulses: 2+ peripheral pulses      Intake/Output:    Intake/Output Summary (Last 24 hours) at 6/3/2024 1636  Last data filed at 6/3/2024 1311  Gross per

## 2024-06-03 NOTE — DISCHARGE SUMMARY
[facility] Hospitalist       Hospitalist Physician Discharge Summary       No follow-up provider specified.    Activity level: Slowly increase as tolerated    Diet: Diet NPO    Labs: None are pending at the discharge    Condition at discharge: Stable    Dispo: Return to home setting     Patient ID:  Natali Wolff  [id number]  69 y.o.  1955    Admit date: 6/1/2024    Discharge date and time:  6/3/2024  3:27 PM    Admission Diagnoses: Principal Problem:    Drug-induced acute pancreatitis without infection or necrosis  Active Problems:    Severe obesity (BMI 35.0-39.9) with comorbidity (HCC)    Mixed hyperlipidemia    Acquired hypothyroidism    Kidney transplanted    Hypertension    Acute renal failure with acute renal cortical necrosis superimposed on stage 4 chronic kidney disease (HCC)    Other emphysema (HCC)    Hypercalcemia  Resolved Problems:    * No resolved hospital problems. *      Discharge Diagnoses: Principal Problem:    Drug-induced acute pancreatitis without infection or necrosis  Active Problems:    Severe obesity (BMI 35.0-39.9) with comorbidity (HCC)    Mixed hyperlipidemia    Acquired hypothyroidism    Kidney transplanted    Hypertension    Acute renal failure with acute renal cortical necrosis superimposed on stage 4 chronic kidney disease (HCC)    Other emphysema (HCC)    Hypercalcemia  Resolved Problems:    * No resolved hospital problems. *      Consults:  IP CONSULT TO NEPHROLOGY  IP CONSULT TO UROLOGY  PHARMACY TO DOSE VANCOMYCIN  IP CONSULT TO CARDIOLOGY  IP CONSULT TO INFECTIOUS DISEASES    Procedures: None significant except if described in hospital course.    Hospital Course: History of present illness from History and Physical:    69 y.o. female with a history of COPD, renal transplant in 2015 on cyclosporine, mycophenolate and prednisone, HTN, HLD, hypothyroid, hyperparathyroid, combined systolic and diastolic heart failure, AFib, PVD, lymphedema presents with  Coronary artery disease. Cardiomegaly.  Small hiatal hernia.  Bibasilar subsegmental atelectasis or scarring.  No pneumoperitoneum.  No focal hepatic abnormality.  No radiopaque gallstone.  Spleen normal.  Fullness and inflammation of the pancreatic head and body low suggestive of pancreatitis but suboptimally evaluated without IV contrast.  Recommend clinical and laboratory correlation for pancreatitis and consider follow-up with contrast enhanced imaging to exclude underlying mass. Adrenal glands normal.  Atrophic cystic kidneys.  IVC filter. Atherosclerotic vascular calcifications.  Bowel negative for obstruction or inflammation.  Large colonic stool burden, which may indicate constipation. Right lower quadrant transplant kidney.  Tiny stone in the transplant kidney. Some gas in the right lower quadrant transplant kidney collecting system and the urinary bladder, which could be related to recent instrumentation or infection with gas-forming organism.  Pelvis otherwise negative.  No acute osseous findings or destructive bone lesions.     1. Fullness and inflammation of the pancreatic head and body most suggestive of pancreatitis but suboptimally evaluated without IV contrast. Recommend clinical and laboratory correlation for pancreatitis and consider follow-up with contrast enhanced imaging to exclude underlying mass. 2. Large colonic stool burden, which may indicate constipation. 3. Right lower quadrant transplant kidney. Tiny stone in the transplant kidney. Some gas in the right lower quadrant transplant kidney collecting system and the urinary bladder, which could be related to recent instrumentation or infection with gas-forming organism.         Patient Instructions:   Current Discharge Medication List        CONTINUE these medications which have NOT CHANGED    Details   predniSONE (DELTASONE) 5 MG tablet Take 1 tablet by mouth daily  Qty: 90 tablet, Refills: 1      folic acid (FOLVITE) 1 MG tablet Take 1

## 2024-06-03 NOTE — CONSULTS
Consult Note            Date:6/2/2024        Patient Name:Natali Wolff     YOB: 1955     Age:69 y.o.    Inpatient consult to Infectious Diseases  Consult performed by: Pushpa Wright MD  Consult ordered by: Susannah Ramirez MD          Chief Complaint     Chief Complaint   Patient presents with    Dysuria     Burning urination with abdominal pain for 2-3 days with nausea          History Obtained From   patient, electronic medical record    History of Present Illness   Natali Wolff is a 69 y.o. female who  has a past medical history of Abnormal EKG, Acute cystitis without hematuria, Acute cystitis without hematuria, Acute exacerbation of chronic obstructive pulmonary disease (COPD) (HCC), Acute gout involving toe of right foot, Acute on chronic combined systolic (congestive) and diastolic (congestive) heart failure (HCC), asthmatic bronchitis, Bipolar disorder (HCC), Cancer (HCC), Cerebrovascular disease, Clotting disorder (HCC), Depression, Hemodialysis patient (HCC), History of blood transfusion, History of renal transplant, Hyperlipidemia, Hypertension, Hypothyroidism, Hypothyroidism, Leg swelling, Leukocytosis, Lymphedema of both lower extremities, Other emphysema (HCC), Paroxysmal atrial fibrillation (HCC), Peripheral vascular disease (HCC), Renal failure, Sepsis (HCC), Thrombophlebitis, and Thyroid disease.   Pt is known to ID/[facility] followed for recurrent uti     Last cultures 5/8 urine mixed rubio   4/10 urine cx Pseudomonas    Pt presents on 6/1/2024 with   Chief Complaint   Patient presents with    Dysuria     Burning urination with abdominal pain for 2-3 days with nausea   Pt from home with abd pain /lower back   Afebrile VS stable 4L  Pt denies f/c   Had nausea   But had weakness  Pt received vanco/piptazo in ER  Pt  lying in bed appears uncomfortable has inc abd distension  Bladder scan >600cc   Has purwick     Cr2.6->2.4  wbc14.2 ->15.2  UA ketones/protein Le     Recent Labs     06/01/24  1200 06/01/24  1415 06/01/24 2047 06/02/24  1034   * 132 135 137   K 4.1 4.4 4.0 4.9   CL 88* 90* 97* 102   CO2 28 25 25 20*   BUN 40* 40* 38* 37*   CREATININE 2.6* 2.7* 2.5* 2.4*   GLUCOSE 145* 148* 124* 86   PHOS 3.8  --   --  4.1   MG 1.6 1.6  --  1.3*       Recent Labs     06/01/24  1415 06/02/24  1034   AST 18 15   ALT 9 7     Lab Results   Component Value Date/Time    CHOL 177 04/01/2024 09:44 AM    TRIG 150 04/01/2024 09:44 AM    HDL 48 04/01/2024 09:44 AM     Lab Results   Component Value Date/Time    VITD25 45.2 06/01/2024 12:00 PM        Recent Labs     06/01/24  1415 06/02/24  1034   AST 18 15   ALT 9 7   BILITOT 0.5 0.5   ALKPHOS 70 49     Results       Procedure Component Value Units Date/Time    Culture, Blood 1 [[id number]] Collected: 06/01/24 1539    Order Status: Completed Specimen: Blood Updated: 06/02/24 2310     Specimen Description .BLOOD     Special Requests          Culture NO GROWTH 1 DAY    Culture, Blood 2 [[id number]] Collected: 06/01/24 1539    Order Status: Completed Specimen: Blood Updated: 06/02/24 2311     Specimen Description .BLOOD     Special Requests Site: Blood     Culture NO GROWTH 1 DAY    Culture, Urine [[id number]] Collected: 06/01/24 1539    Order Status: Sent Specimen: Urine, clean catch Updated: 06/01/24 2001            Imaging/Diagnostics   XR ABDOMEN (KUB) (SINGLE AP VIEW)    Result Date: 6/2/2024  1. Moderate amount of stool throughout the colon. 2. No bowel obstruction or evidence of pneumoperitoneum.     CT ABDOMEN PELVIS WO CONTRAST Additional Contrast? None    Result Date: 6/1/2024  1. Fullness and inflammation of the pancreatic head and body most suggestive of pancreatitis but suboptimally evaluated without IV contrast. Recommend clinical and laboratory correlation for pancreatitis and consider follow-up with contrast enhanced imaging to exclude underlying mass. 2. Large colonic stool burden, which may indicate constipation. 3.

## 2024-06-03 NOTE — PLAN OF CARE
Problem: Discharge Planning  Goal: Discharge to home or other facility with appropriate resources  6/3/2024 1054 by Anais Hernandez RN  Outcome: Progressing  6/3/2024 0233 by Kevin Zaragoza RN  Outcome: Progressing     Problem: Pain  Goal: Verbalizes/displays adequate comfort level or baseline comfort level  6/3/2024 1054 by Anais Hernandez RN  Outcome: Progressing  6/3/2024 0233 by Kevin Zaragoza RN  Outcome: Progressing     Problem: Skin/Tissue Integrity  Goal: Absence of new skin breakdown  Description: 1.  Monitor for areas of redness and/or skin breakdown  2.  Assess vascular access sites hourly  3.  Every 4-6 hours minimum:  Change oxygen saturation probe site  4.  Every 4-6 hours:  If on nasal continuous positive airway pressure, respiratory therapy assess nares and determine need for appliance change or resting period.  6/3/2024 1054 by Anais Hernandez RN  Outcome: Progressing  6/3/2024 0233 by Kevin Zaragoza RN  Outcome: Progressing     Problem: Safety - Adult  Goal: Free from fall injury  6/3/2024 1054 by Anais Hernandez RN  Outcome: Progressing  6/3/2024 0233 by Kevin Zaragoza RN  Outcome: Progressing

## 2024-06-04 ENCOUNTER — APPOINTMENT (OUTPATIENT)
Dept: RADIOLOGY | Facility: HOSPITAL | Age: 69
End: 2024-06-04
Payer: MEDICARE

## 2024-06-04 ENCOUNTER — APPOINTMENT (OUTPATIENT)
Dept: CARDIOLOGY | Facility: HOSPITAL | Age: 69
End: 2024-06-04
Payer: MEDICARE

## 2024-06-04 LAB
25(OH)D3 SERPL-MCNC: 35 NG/ML (ref 30–100)
ABO GROUP (TYPE) IN BLOOD: NORMAL
ALBUMIN SERPL BCP-MCNC: 2.7 G/DL (ref 3.4–5)
ALBUMIN SERPL BCP-MCNC: 2.8 G/DL (ref 3.4–5)
ALP SERPL-CCNC: 48 U/L (ref 33–136)
ALT SERPL W P-5'-P-CCNC: 7 U/L (ref 7–45)
AMMONIA PLAS-SCNC: 34 UMOL/L (ref 16–53)
ANION GAP BLDA CALCULATED.4IONS-SCNC: 4 MMO/L (ref 10–25)
ANION GAP BLDA CALCULATED.4IONS-SCNC: 5 MMO/L (ref 10–25)
ANION GAP BLDV CALCULATED.4IONS-SCNC: 6 MMOL/L (ref 10–25)
ANION GAP BLDV CALCULATED.4IONS-SCNC: 7 MMOL/L (ref 10–25)
ANION GAP BLDV CALCULATED.4IONS-SCNC: 7 MMOL/L (ref 10–25)
ANION GAP SERPL CALC-SCNC: 14 MMOL/L (ref 10–20)
ANION GAP SERPL CALC-SCNC: 8 MMOL/L (ref 10–20)
ANTIBODY SCREEN: NORMAL
AORTIC VALVE MEAN GRADIENT: 8 MMHG
AORTIC VALVE PEAK VELOCITY: 2.03 M/S
APPARATUS: ABNORMAL
APTT PPP: 29 SECONDS (ref 27–38)
APTT PPP: 49 SECONDS (ref 27–38)
AST SERPL W P-5'-P-CCNC: 12 U/L (ref 9–39)
AV PEAK GRADIENT: 16.5 MMHG
BACTERIA UR CULT: NO GROWTH
BASE EXCESS BLDA CALC-SCNC: -0.2 MMOL/L (ref -2–3)
BASE EXCESS BLDA CALC-SCNC: -0.8 MMOL/L (ref -2–3)
BASE EXCESS BLDV CALC-SCNC: -1.4 MMOL/L (ref -2–3)
BASE EXCESS BLDV CALC-SCNC: -2.1 MMOL/L (ref -2–3)
BASE EXCESS BLDV CALC-SCNC: 2.1 MMOL/L (ref -2–3)
BASOPHILS # BLD AUTO: 0.03 X10*3/UL (ref 0–0.1)
BASOPHILS NFR BLD AUTO: 0.2 %
BILIRUB SERPL-MCNC: 0.5 MG/DL (ref 0–1.2)
BNP SERPL-MCNC: 392 PG/ML (ref 0–99)
BODY TEMPERATURE: 37 DEGREES CELSIUS
BUN SERPL-MCNC: 25 MG/DL (ref 6–23)
BUN SERPL-MCNC: 26 MG/DL (ref 6–23)
C PEPTIDE SERPL-MCNC: 5.2 NG/ML (ref 0.7–3.9)
CA-I BLD-SCNC: 1.31 MMOL/L (ref 1.1–1.33)
CA-I BLD-SCNC: 1.38 MMOL/L (ref 1.1–1.33)
CA-I BLDA-SCNC: 1.33 MMOL/L (ref 1.1–1.33)
CA-I BLDA-SCNC: 1.35 MMOL/L (ref 1.1–1.33)
CA-I BLDV-SCNC: 1.3 MMOL/L (ref 1.1–1.33)
CA-I BLDV-SCNC: 1.33 MMOL/L (ref 1.1–1.33)
CA-I BLDV-SCNC: 1.42 MMOL/L (ref 1.1–1.33)
CALCIUM SERPL-MCNC: 9.1 MG/DL (ref 8.6–10.6)
CALCIUM SERPL-MCNC: 9.3 MG/DL (ref 8.6–10.6)
CARDIAC TROPONIN I PNL SERPL HS: 60 NG/L (ref 0–34)
CARDIAC TROPONIN I PNL SERPL HS: 63 NG/L (ref 0–34)
CHLORIDE BLDA-SCNC: 106 MMOL/L (ref 98–107)
CHLORIDE BLDA-SCNC: 108 MMOL/L (ref 98–107)
CHLORIDE BLDV-SCNC: 106 MMOL/L (ref 98–107)
CHLORIDE BLDV-SCNC: 106 MMOL/L (ref 98–107)
CHLORIDE BLDV-SCNC: 108 MMOL/L (ref 98–107)
CHLORIDE SERPL-SCNC: 108 MMOL/L (ref 98–107)
CHLORIDE SERPL-SCNC: 109 MMOL/L (ref 98–107)
CHOLEST SERPL-MCNC: 108 MG/DL (ref 0–199)
CHOLESTEROL/HDL RATIO: 3.7
CO2 SERPL-SCNC: 28 MMOL/L (ref 21–32)
CO2 SERPL-SCNC: 28 MMOL/L (ref 21–32)
CORTIS AM PEAK SERPL-MSCNC: 29.2 UG/DL (ref 5–20)
CREAT SERPL-MCNC: 1.37 MG/DL (ref 0.5–1.05)
CREAT SERPL-MCNC: 1.42 MG/DL (ref 0.5–1.05)
CYCLOSPORINE BLD IA-MCNC: 91 NG/ML (ref 80–210)
EGFRCR SERPLBLD CKD-EPI 2021: 40 ML/MIN/1.73M*2
EGFRCR SERPLBLD CKD-EPI 2021: 42 ML/MIN/1.73M*2
EOSINOPHIL # BLD AUTO: 0.04 X10*3/UL (ref 0–0.7)
EOSINOPHIL # BLD AUTO: 0.05 X10*3/UL (ref 0–0.7)
EOSINOPHIL # BLD AUTO: 0.08 X10*3/UL (ref 0–0.7)
EOSINOPHIL NFR BLD AUTO: 0.3 %
EOSINOPHIL NFR BLD AUTO: 0.3 %
EOSINOPHIL NFR BLD AUTO: 0.4 %
ERYTHROCYTE [DISTWIDTH] IN BLOOD BY AUTOMATED COUNT: 16.4 % (ref 11.5–14.5)
ERYTHROCYTE [DISTWIDTH] IN BLOOD BY AUTOMATED COUNT: 16.4 % (ref 11.5–14.5)
ERYTHROCYTE [DISTWIDTH] IN BLOOD BY AUTOMATED COUNT: 16.5 % (ref 11.5–14.5)
GLUCOSE BLD MANUAL STRIP-MCNC: 102 MG/DL (ref 74–99)
GLUCOSE BLD MANUAL STRIP-MCNC: 104 MG/DL (ref 74–99)
GLUCOSE BLD MANUAL STRIP-MCNC: 121 MG/DL (ref 74–99)
GLUCOSE BLD MANUAL STRIP-MCNC: 151 MG/DL (ref 74–99)
GLUCOSE BLD MANUAL STRIP-MCNC: 163 MG/DL (ref 74–99)
GLUCOSE BLD MANUAL STRIP-MCNC: 171 MG/DL (ref 74–99)
GLUCOSE BLD MANUAL STRIP-MCNC: 61 MG/DL (ref 74–99)
GLUCOSE BLD MANUAL STRIP-MCNC: 62 MG/DL (ref 74–99)
GLUCOSE BLD MANUAL STRIP-MCNC: 67 MG/DL (ref 74–99)
GLUCOSE BLD MANUAL STRIP-MCNC: 69 MG/DL (ref 74–99)
GLUCOSE BLD MANUAL STRIP-MCNC: 73 MG/DL (ref 74–99)
GLUCOSE BLD MANUAL STRIP-MCNC: 74 MG/DL (ref 74–99)
GLUCOSE BLD MANUAL STRIP-MCNC: 91 MG/DL (ref 74–99)
GLUCOSE BLD MANUAL STRIP-MCNC: 97 MG/DL (ref 74–99)
GLUCOSE BLD MANUAL STRIP-MCNC: 99 MG/DL (ref 74–99)
GLUCOSE BLD MANUAL STRIP-MCNC: 99 MG/DL (ref 74–99)
GLUCOSE BLDA-MCNC: 109 MG/DL (ref 74–99)
GLUCOSE BLDA-MCNC: 70 MG/DL (ref 74–99)
GLUCOSE BLDV-MCNC: 84 MG/DL (ref 74–99)
GLUCOSE BLDV-MCNC: 95 MG/DL (ref 74–99)
GLUCOSE BLDV-MCNC: 98 MG/DL (ref 74–99)
GLUCOSE SERPL-MCNC: 89 MG/DL (ref 74–99)
GLUCOSE SERPL-MCNC: 95 MG/DL (ref 74–99)
HCO3 BLDA-SCNC: 28.3 MMOL/L (ref 22–26)
HCO3 BLDA-SCNC: 28.5 MMOL/L (ref 22–26)
HCO3 BLDV-SCNC: 27.2 MMOL/L (ref 22–26)
HCO3 BLDV-SCNC: 28.5 MMOL/L (ref 22–26)
HCO3 BLDV-SCNC: 28.7 MMOL/L (ref 22–26)
HCT VFR BLD AUTO: 36.3 % (ref 36–46)
HCT VFR BLD AUTO: 39.9 % (ref 36–46)
HCT VFR BLD AUTO: 42.5 % (ref 36–46)
HCT VFR BLD EST: 38 % (ref 36–46)
HCT VFR BLD EST: 39 % (ref 36–46)
HCT VFR BLD EST: 40 % (ref 36–46)
HCT VFR BLD EST: 41 % (ref 36–46)
HCT VFR BLD EST: 50 % (ref 36–46)
HDLC SERPL-MCNC: 29.2 MG/DL
HGB BLD-MCNC: 10.9 G/DL (ref 12–16)
HGB BLD-MCNC: 12.2 G/DL (ref 12–16)
HGB BLD-MCNC: 12.9 G/DL (ref 12–16)
HGB BLDA-MCNC: 13.4 G/DL (ref 12–16)
HGB BLDA-MCNC: 16.7 G/DL (ref 12–16)
HGB BLDV-MCNC: 12.7 G/DL (ref 12–16)
HGB BLDV-MCNC: 12.9 G/DL (ref 12–16)
HGB BLDV-MCNC: 13.6 G/DL (ref 12–16)
HOLD SPECIMEN: NORMAL
IMM GRANULOCYTES # BLD AUTO: 0.22 X10*3/UL (ref 0–0.7)
IMM GRANULOCYTES # BLD AUTO: 0.24 X10*3/UL (ref 0–0.7)
IMM GRANULOCYTES # BLD AUTO: 0.34 X10*3/UL (ref 0–0.7)
IMM GRANULOCYTES NFR BLD AUTO: 1.3 % (ref 0–0.9)
IMM GRANULOCYTES NFR BLD AUTO: 1.5 % (ref 0–0.9)
IMM GRANULOCYTES NFR BLD AUTO: 1.7 % (ref 0–0.9)
INHALED O2 CONCENTRATION: 36 %
INHALED O2 CONCENTRATION: 40 %
INHALED O2 CONCENTRATION: 40 %
INHALED O2 CONCENTRATION: 91 %
INR PPP: 1.5 (ref 0.9–1.1)
INR PPP: 1.5 (ref 0.9–1.1)
LACTATE BLDA-SCNC: 0.5 MMOL/L (ref 0.4–2)
LACTATE BLDA-SCNC: 0.7 MMOL/L (ref 0.4–2)
LACTATE BLDV-SCNC: 0.6 MMOL/L (ref 0.4–2)
LACTATE BLDV-SCNC: 1.1 MMOL/L (ref 0.4–2)
LACTATE BLDV-SCNC: 1.3 MMOL/L (ref 0.4–2)
LDLC SERPL CALC-MCNC: 62 MG/DL
LEFT ATRIUM VOLUME AREA LENGTH INDEX BSA: 33.3 ML/M2
LIPASE SERPL-CCNC: 418 U/L (ref 9–82)
LYMPHOCYTES # BLD AUTO: 0.23 X10*3/UL (ref 1.2–4.8)
LYMPHOCYTES # BLD AUTO: 0.41 X10*3/UL (ref 1.2–4.8)
LYMPHOCYTES # BLD AUTO: 0.45 X10*3/UL (ref 1.2–4.8)
LYMPHOCYTES NFR BLD AUTO: 1.6 %
LYMPHOCYTES NFR BLD AUTO: 2.2 %
LYMPHOCYTES NFR BLD AUTO: 2.3 %
MAGNESIUM SERPL-MCNC: 2.35 MG/DL (ref 1.6–2.4)
MAGNESIUM SERPL-MCNC: 2.35 MG/DL (ref 1.6–2.4)
MCH RBC QN AUTO: 35.2 PG (ref 26–34)
MCH RBC QN AUTO: 35.4 PG (ref 26–34)
MCH RBC QN AUTO: 35.9 PG (ref 26–34)
MCHC RBC AUTO-ENTMCNC: 30 G/DL (ref 32–36)
MCHC RBC AUTO-ENTMCNC: 30.4 G/DL (ref 32–36)
MCHC RBC AUTO-ENTMCNC: 30.6 G/DL (ref 32–36)
MCV RBC AUTO: 115 FL (ref 80–100)
MCV RBC AUTO: 117 FL (ref 80–100)
MCV RBC AUTO: 119 FL (ref 80–100)
MITRAL VALVE E/E' RATIO: 12.95
MONOCYTES # BLD AUTO: 0.38 X10*3/UL (ref 0.1–1)
MONOCYTES # BLD AUTO: 0.86 X10*3/UL (ref 0.1–1)
MONOCYTES # BLD AUTO: 0.86 X10*3/UL (ref 0.1–1)
MONOCYTES NFR BLD AUTO: 2.6 %
MONOCYTES NFR BLD AUTO: 4.3 %
MONOCYTES NFR BLD AUTO: 4.6 %
NEUTROPHILS # BLD AUTO: 13.77 X10*3/UL (ref 1.2–7.7)
NEUTROPHILS # BLD AUTO: 17.11 X10*3/UL (ref 1.2–7.7)
NEUTROPHILS # BLD AUTO: 18.25 X10*3/UL (ref 1.2–7.7)
NEUTROPHILS NFR BLD AUTO: 91.2 %
NEUTROPHILS NFR BLD AUTO: 91.3 %
NEUTROPHILS NFR BLD AUTO: 93.8 %
NON HDL CHOLESTEROL: 79 MG/DL (ref 0–149)
NRBC BLD-RTO: 0 /100 WBCS (ref 0–0)
NRBC BLD-RTO: 0.1 /100 WBCS (ref 0–0)
NRBC BLD-RTO: 0.1 /100 WBCS (ref 0–0)
OXYHGB MFR BLDA: 94.7 % (ref 94–98)
OXYHGB MFR BLDA: 94.9 % (ref 94–98)
OXYHGB MFR BLDV: 49.8 % (ref 45–75)
OXYHGB MFR BLDV: 69.1 % (ref 45–75)
OXYHGB MFR BLDV: 93.7 % (ref 45–75)
PATH REVIEW-CBC DIFFERENTIAL: NORMAL
PCO2 BLDA: 63 MM HG (ref 38–42)
PCO2 BLDA: 65 MM HG (ref 38–42)
PCO2 BLDV: 52 MM HG (ref 41–51)
PCO2 BLDV: 68 MM HG (ref 41–51)
PCO2 BLDV: 73 MM HG (ref 41–51)
PH BLDA: 7.25 PH (ref 7.38–7.42)
PH BLDA: 7.26 PH (ref 7.38–7.42)
PH BLDV: 7.2 PH (ref 7.33–7.43)
PH BLDV: 7.21 PH (ref 7.33–7.43)
PH BLDV: 7.35 PH (ref 7.33–7.43)
PHOSPHATE SERPL-MCNC: 2 MG/DL (ref 2.5–4.9)
PLATELET # BLD AUTO: 105 X10*3/UL (ref 150–450)
PLATELET # BLD AUTO: 111 X10*3/UL (ref 150–450)
PLATELET # BLD AUTO: 88 X10*3/UL (ref 150–450)
PO2 BLDA: 82 MM HG (ref 85–95)
PO2 BLDA: 84 MM HG (ref 85–95)
PO2 BLDV: 28 MM HG (ref 35–45)
PO2 BLDV: 41 MM HG (ref 35–45)
PO2 BLDV: 70 MM HG (ref 35–45)
POTASSIUM BLDA-SCNC: 3.7 MMOL/L (ref 3.5–5.3)
POTASSIUM BLDA-SCNC: 3.7 MMOL/L (ref 3.5–5.3)
POTASSIUM BLDV-SCNC: 3.2 MMOL/L (ref 3.5–5.3)
POTASSIUM BLDV-SCNC: 4.5 MMOL/L (ref 3.5–5.3)
POTASSIUM BLDV-SCNC: 5.9 MMOL/L (ref 3.5–5.3)
POTASSIUM SERPL-SCNC: 3.4 MMOL/L (ref 3.5–5.3)
POTASSIUM SERPL-SCNC: 5.5 MMOL/L (ref 3.5–5.3)
PROT SERPL-MCNC: 4.9 G/DL (ref 6.4–8.2)
PROTHROMBIN TIME: 16.6 SECONDS (ref 9.8–12.8)
PROTHROMBIN TIME: 16.6 SECONDS (ref 9.8–12.8)
RBC # BLD AUTO: 3.04 X10*6/UL (ref 4–5.2)
RBC # BLD AUTO: 3.47 X10*6/UL (ref 4–5.2)
RBC # BLD AUTO: 3.64 X10*6/UL (ref 4–5.2)
RH FACTOR (ANTIGEN D): NORMAL
RIGHT VENTRICLE FREE WALL PEAK S': 18.5 CM/S
SAO2 % BLDA: 98 % (ref 94–100)
SAO2 % BLDA: 98 % (ref 94–100)
SAO2 % BLDV: 51 % (ref 45–75)
SAO2 % BLDV: 71 % (ref 45–75)
SAO2 % BLDV: 96 % (ref 45–75)
SODIUM BLDA-SCNC: 136 MMOL/L (ref 136–145)
SODIUM BLDA-SCNC: 137 MMOL/L (ref 136–145)
SODIUM BLDV-SCNC: 136 MMOL/L (ref 136–145)
SODIUM BLDV-SCNC: 137 MMOL/L (ref 136–145)
SODIUM BLDV-SCNC: 138 MMOL/L (ref 136–145)
SODIUM SERPL-SCNC: 142 MMOL/L (ref 136–145)
SODIUM SERPL-SCNC: 144 MMOL/L (ref 136–145)
TRIGL SERPL-MCNC: 82 MG/DL (ref 0–149)
UFH PPP CHRO-ACNC: 1.6 IU/ML
UFH PPP CHRO-ACNC: 1.9 IU/ML
UFH PPP CHRO-ACNC: >2 IU/ML
VLDL: 16 MG/DL (ref 0–40)
WBC # BLD AUTO: 14.7 X10*3/UL (ref 4.4–11.3)
WBC # BLD AUTO: 18.7 X10*3/UL (ref 4.4–11.3)
WBC # BLD AUTO: 20 X10*3/UL (ref 4.4–11.3)

## 2024-06-04 PROCEDURE — 84132 ASSAY OF SERUM POTASSIUM: CPT | Mod: 91

## 2024-06-04 PROCEDURE — 99221 1ST HOSP IP/OBS SF/LOW 40: CPT | Performed by: STUDENT IN AN ORGANIZED HEALTH CARE EDUCATION/TRAINING PROGRAM

## 2024-06-04 PROCEDURE — 2500000005 HC RX 250 GENERAL PHARMACY W/O HCPCS

## 2024-06-04 PROCEDURE — 82533 TOTAL CORTISOL: CPT

## 2024-06-04 PROCEDURE — 82330 ASSAY OF CALCIUM: CPT | Mod: 91

## 2024-06-04 PROCEDURE — 5A1935Z RESPIRATORY VENTILATION, LESS THAN 24 CONSECUTIVE HOURS: ICD-10-PCS | Performed by: INTERNAL MEDICINE

## 2024-06-04 PROCEDURE — 2550000001 HC RX 255 CONTRASTS: Performed by: STUDENT IN AN ORGANIZED HEALTH CARE EDUCATION/TRAINING PROGRAM

## 2024-06-04 PROCEDURE — 71260 CT THORAX DX C+: CPT | Performed by: RADIOLOGY

## 2024-06-04 PROCEDURE — 72125 CT NECK SPINE W/O DYE: CPT

## 2024-06-04 PROCEDURE — 84484 ASSAY OF TROPONIN QUANT: CPT

## 2024-06-04 PROCEDURE — 85610 PROTHROMBIN TIME: CPT

## 2024-06-04 PROCEDURE — 2500000001 HC RX 250 WO HCPCS SELF ADMINISTERED DRUGS (ALT 637 FOR MEDICARE OP)

## 2024-06-04 PROCEDURE — 80202 ASSAY OF VANCOMYCIN: CPT

## 2024-06-04 PROCEDURE — 82652 VIT D 1 25-DIHYDROXY: CPT

## 2024-06-04 PROCEDURE — 83735 ASSAY OF MAGNESIUM: CPT | Mod: 91 | Performed by: STUDENT IN AN ORGANIZED HEALTH CARE EDUCATION/TRAINING PROGRAM

## 2024-06-04 PROCEDURE — 82397 CHEMILUMINESCENT ASSAY: CPT

## 2024-06-04 PROCEDURE — 82947 ASSAY GLUCOSE BLOOD QUANT: CPT | Mod: 91

## 2024-06-04 PROCEDURE — 36415 COLL VENOUS BLD VENIPUNCTURE: CPT

## 2024-06-04 PROCEDURE — 99291 CRITICAL CARE FIRST HOUR: CPT

## 2024-06-04 PROCEDURE — 2500000004 HC RX 250 GENERAL PHARMACY W/ HCPCS (ALT 636 FOR OP/ED)

## 2024-06-04 PROCEDURE — 71045 X-RAY EXAM CHEST 1 VIEW: CPT

## 2024-06-04 PROCEDURE — 93306 TTE W/DOPPLER COMPLETE: CPT

## 2024-06-04 PROCEDURE — 99222 1ST HOSP IP/OBS MODERATE 55: CPT | Performed by: INTERNAL MEDICINE

## 2024-06-04 PROCEDURE — 70450 CT HEAD/BRAIN W/O DYE: CPT

## 2024-06-04 PROCEDURE — 74177 CT ABD & PELVIS W/CONTRAST: CPT

## 2024-06-04 PROCEDURE — 85025 COMPLETE CBC W/AUTO DIFF WBC: CPT | Mod: 91

## 2024-06-04 PROCEDURE — 82306 VITAMIN D 25 HYDROXY: CPT

## 2024-06-04 PROCEDURE — 85025 COMPLETE CBC W/AUTO DIFF WBC: CPT

## 2024-06-04 PROCEDURE — 94002 VENT MGMT INPAT INIT DAY: CPT

## 2024-06-04 PROCEDURE — 84132 ASSAY OF SERUM POTASSIUM: CPT | Mod: 91 | Performed by: STUDENT IN AN ORGANIZED HEALTH CARE EDUCATION/TRAINING PROGRAM

## 2024-06-04 PROCEDURE — 83880 ASSAY OF NATRIURETIC PEPTIDE: CPT

## 2024-06-04 PROCEDURE — 2500000002 HC RX 250 W HCPCS SELF ADMINISTERED DRUGS (ALT 637 FOR MEDICARE OP, ALT 636 FOR OP/ED)

## 2024-06-04 PROCEDURE — 85610 PROTHROMBIN TIME: CPT | Mod: 91

## 2024-06-04 PROCEDURE — 2500000005 HC RX 250 GENERAL PHARMACY W/O HCPCS: Performed by: STUDENT IN AN ORGANIZED HEALTH CARE EDUCATION/TRAINING PROGRAM

## 2024-06-04 PROCEDURE — 71045 X-RAY EXAM CHEST 1 VIEW: CPT | Performed by: RADIOLOGY

## 2024-06-04 PROCEDURE — 85025 COMPLETE CBC W/AUTO DIFF WBC: CPT | Mod: 91 | Performed by: STUDENT IN AN ORGANIZED HEALTH CARE EDUCATION/TRAINING PROGRAM

## 2024-06-04 PROCEDURE — 2500000002 HC RX 250 W HCPCS SELF ADMINISTERED DRUGS (ALT 637 FOR MEDICARE OP, ALT 636 FOR OP/ED): Performed by: STUDENT IN AN ORGANIZED HEALTH CARE EDUCATION/TRAINING PROGRAM

## 2024-06-04 PROCEDURE — 2020000001 HC ICU ROOM DAILY

## 2024-06-04 PROCEDURE — 94660 CPAP INITIATION&MGMT: CPT

## 2024-06-04 PROCEDURE — 72129 CT CHEST SPINE W/DYE: CPT

## 2024-06-04 PROCEDURE — 99233 SBSQ HOSP IP/OBS HIGH 50: CPT

## 2024-06-04 PROCEDURE — 93010 ELECTROCARDIOGRAM REPORT: CPT | Performed by: INTERNAL MEDICINE

## 2024-06-04 PROCEDURE — 84206 ASSAY OF PROINSULIN: CPT

## 2024-06-04 PROCEDURE — 93306 TTE W/DOPPLER COMPLETE: CPT | Performed by: STUDENT IN AN ORGANIZED HEALTH CARE EDUCATION/TRAINING PROGRAM

## 2024-06-04 PROCEDURE — 5A09357 ASSISTANCE WITH RESPIRATORY VENTILATION, LESS THAN 24 CONSECUTIVE HOURS, CONTINUOUS POSITIVE AIRWAY PRESSURE: ICD-10-PCS | Performed by: INTERNAL MEDICINE

## 2024-06-04 PROCEDURE — 86901 BLOOD TYPING SEROLOGIC RH(D): CPT

## 2024-06-04 PROCEDURE — 85520 HEPARIN ASSAY: CPT

## 2024-06-04 PROCEDURE — 83735 ASSAY OF MAGNESIUM: CPT

## 2024-06-04 PROCEDURE — 84484 ASSAY OF TROPONIN QUANT: CPT | Mod: 91 | Performed by: STUDENT IN AN ORGANIZED HEALTH CARE EDUCATION/TRAINING PROGRAM

## 2024-06-04 PROCEDURE — 94640 AIRWAY INHALATION TREATMENT: CPT

## 2024-06-04 PROCEDURE — 2500000004 HC RX 250 GENERAL PHARMACY W/ HCPCS (ALT 636 FOR OP/ED): Performed by: STUDENT IN AN ORGANIZED HEALTH CARE EDUCATION/TRAINING PROGRAM

## 2024-06-04 PROCEDURE — 74018 RADEX ABDOMEN 1 VIEW: CPT | Performed by: RADIOLOGY

## 2024-06-04 PROCEDURE — 83690 ASSAY OF LIPASE: CPT | Performed by: STUDENT IN AN ORGANIZED HEALTH CARE EDUCATION/TRAINING PROGRAM

## 2024-06-04 PROCEDURE — 74177 CT ABD & PELVIS W/CONTRAST: CPT | Performed by: RADIOLOGY

## 2024-06-04 PROCEDURE — 84132 ASSAY OF SERUM POTASSIUM: CPT

## 2024-06-04 PROCEDURE — 93005 ELECTROCARDIOGRAM TRACING: CPT

## 2024-06-04 PROCEDURE — 70450 CT HEAD/BRAIN W/O DYE: CPT | Performed by: RADIOLOGY

## 2024-06-04 PROCEDURE — 74018 RADEX ABDOMEN 1 VIEW: CPT

## 2024-06-04 PROCEDURE — 99222 1ST HOSP IP/OBS MODERATE 55: CPT | Performed by: PSYCHIATRY & NEUROLOGY

## 2024-06-04 PROCEDURE — 82330 ASSAY OF CALCIUM: CPT | Mod: 91 | Performed by: STUDENT IN AN ORGANIZED HEALTH CARE EDUCATION/TRAINING PROGRAM

## 2024-06-04 PROCEDURE — 82140 ASSAY OF AMMONIA: CPT

## 2024-06-04 PROCEDURE — 85520 HEPARIN ASSAY: CPT | Mod: 91

## 2024-06-04 PROCEDURE — 72132 CT LUMBAR SPINE W/DYE: CPT

## 2024-06-04 PROCEDURE — 80061 LIPID PANEL: CPT

## 2024-06-04 PROCEDURE — 84681 ASSAY OF C-PEPTIDE: CPT

## 2024-06-04 PROCEDURE — 36600 WITHDRAWAL OF ARTERIAL BLOOD: CPT

## 2024-06-04 RX ORDER — POTASSIUM CHLORIDE 14.9 MG/ML
20 INJECTION INTRAVENOUS
Status: DISPENSED | OUTPATIENT
Start: 2024-06-04 | End: 2024-06-04

## 2024-06-04 RX ORDER — CYCLOSPORINE 25 MG/1
100 CAPSULE, GELATIN COATED ORAL 2 TIMES DAILY
Status: DISCONTINUED | OUTPATIENT
Start: 2024-06-04 | End: 2024-06-04

## 2024-06-04 RX ORDER — SENNOSIDES 8.6 MG/1
1 TABLET ORAL NIGHTLY
Status: DISCONTINUED | OUTPATIENT
Start: 2024-06-04 | End: 2024-06-04

## 2024-06-04 RX ORDER — MYCOPHENOLATE MOFETIL 250 MG/1
500 CAPSULE ORAL 2 TIMES DAILY
Status: DISCONTINUED | OUTPATIENT
Start: 2024-06-04 | End: 2024-06-04

## 2024-06-04 RX ORDER — MYCOPHENOLATE MOFETIL 200 MG/ML
500 POWDER, FOR SUSPENSION ORAL 2 TIMES DAILY
Status: DISCONTINUED | OUTPATIENT
Start: 2024-06-04 | End: 2024-06-07

## 2024-06-04 RX ORDER — VANCOMYCIN HYDROCHLORIDE 1 G/200ML
1000 INJECTION, SOLUTION INTRAVENOUS ONCE
Status: COMPLETED | OUTPATIENT
Start: 2024-06-04 | End: 2024-06-04

## 2024-06-04 RX ORDER — FERROUS SULFATE 325(65) MG
325 TABLET ORAL
COMMUNITY

## 2024-06-04 RX ORDER — ALLOPURINOL 100 MG/1
100 TABLET ORAL DAILY
COMMUNITY

## 2024-06-04 RX ORDER — DEXTROSE 50 % IN WATER (D50W) INTRAVENOUS SYRINGE
12.5
Status: DISCONTINUED | OUTPATIENT
Start: 2024-06-04 | End: 2024-06-06

## 2024-06-04 RX ORDER — CYCLOSPORINE 100 MG/ML
100 SOLUTION ORAL
Status: DISCONTINUED | OUTPATIENT
Start: 2024-06-04 | End: 2024-06-10

## 2024-06-04 RX ORDER — CALCITRIOL 0.5 UG/1
0.5 CAPSULE ORAL 3 TIMES WEEKLY
Status: DISCONTINUED | OUTPATIENT
Start: 2024-06-04 | End: 2024-06-04

## 2024-06-04 RX ORDER — VANCOMYCIN HYDROCHLORIDE 125 MG/1
125 CAPSULE ORAL 3 TIMES WEEKLY
COMMUNITY
End: 2024-06-15 | Stop reason: HOSPADM

## 2024-06-04 RX ORDER — POLYETHYLENE GLYCOL 3350 17 G/17G
17 POWDER, FOR SOLUTION ORAL 2 TIMES DAILY
Status: DISCONTINUED | OUTPATIENT
Start: 2024-06-04 | End: 2024-06-06 | Stop reason: SDUPTHER

## 2024-06-04 RX ORDER — DEXTROSE 50 % IN WATER (D50W) INTRAVENOUS SYRINGE
25
Status: DISCONTINUED | OUTPATIENT
Start: 2024-06-04 | End: 2024-06-06

## 2024-06-04 RX ORDER — ETOMIDATE 2 MG/ML
INJECTION INTRAVENOUS
Status: COMPLETED
Start: 2024-06-04 | End: 2024-06-04

## 2024-06-04 RX ORDER — CALCITRIOL 0.5 UG/1
0.5 CAPSULE ORAL 3 TIMES WEEKLY
COMMUNITY
End: 2024-06-15 | Stop reason: HOSPADM

## 2024-06-04 RX ORDER — SIMVASTATIN 20 MG/1
40 TABLET, FILM COATED ORAL NIGHTLY
Status: DISCONTINUED | OUTPATIENT
Start: 2024-06-04 | End: 2024-06-08

## 2024-06-04 RX ORDER — FENTANYL CITRATE-0.9 % NACL/PF 10 MCG/ML
0-300 PLASTIC BAG, INJECTION (ML) INTRAVENOUS CONTINUOUS
Status: DISCONTINUED | OUTPATIENT
Start: 2024-06-04 | End: 2024-06-06

## 2024-06-04 RX ORDER — DEXTROSE 50 % IN WATER (D50W) INTRAVENOUS SYRINGE
25 ONCE
Status: COMPLETED | OUTPATIENT
Start: 2024-06-04 | End: 2024-06-04

## 2024-06-04 RX ORDER — FENTANYL CITRATE 50 UG/ML
100 INJECTION, SOLUTION INTRAMUSCULAR; INTRAVENOUS ONCE
Status: DISCONTINUED | OUTPATIENT
Start: 2024-06-04 | End: 2024-06-06

## 2024-06-04 RX ORDER — MIDAZOLAM HYDROCHLORIDE 1 MG/ML
INJECTION INTRAMUSCULAR; INTRAVENOUS
Status: DISPENSED
Start: 2024-06-04 | End: 2024-06-05

## 2024-06-04 RX ORDER — POLYETHYLENE GLYCOL 3350 17 G/17G
17 POWDER, FOR SOLUTION ORAL DAILY
Status: DISCONTINUED | OUTPATIENT
Start: 2024-06-04 | End: 2024-06-04

## 2024-06-04 RX ORDER — SIMVASTATIN 40 MG/1
40 TABLET, FILM COATED ORAL NIGHTLY
COMMUNITY

## 2024-06-04 RX ORDER — DEXTROSE 50 % IN WATER (D50W) INTRAVENOUS SYRINGE
Status: COMPLETED
Start: 2024-06-04 | End: 2024-06-04

## 2024-06-04 RX ORDER — HEPARIN SODIUM 10000 [USP'U]/100ML
0-4500 INJECTION, SOLUTION INTRAVENOUS CONTINUOUS
Status: DISCONTINUED | OUTPATIENT
Start: 2024-06-04 | End: 2024-06-06

## 2024-06-04 RX ORDER — METOPROLOL TARTRATE 25 MG/1
0.5 TABLET, FILM COATED ORAL 2 TIMES DAILY
COMMUNITY
End: 2024-06-15 | Stop reason: HOSPADM

## 2024-06-04 RX ORDER — DEXTROSE MONOHYDRATE AND SODIUM CHLORIDE 5; .9 G/100ML; G/100ML
150 INJECTION, SOLUTION INTRAVENOUS CONTINUOUS
Status: DISCONTINUED | OUTPATIENT
Start: 2024-06-04 | End: 2024-06-04

## 2024-06-04 RX ORDER — PREDNISONE 5 MG/1
5 TABLET ORAL DAILY
Status: DISCONTINUED | OUTPATIENT
Start: 2024-06-04 | End: 2024-06-15 | Stop reason: HOSPADM

## 2024-06-04 RX ORDER — CYCLOSPORINE 100 MG/1
100 CAPSULE, GELATIN COATED ORAL 2 TIMES DAILY
COMMUNITY
End: 2024-06-15 | Stop reason: HOSPADM

## 2024-06-04 RX ORDER — HEPARIN SODIUM 10000 [USP'U]/100ML
0-4500 INJECTION, SOLUTION INTRAVENOUS CONTINUOUS
Status: DISCONTINUED | OUTPATIENT
Start: 2024-06-04 | End: 2024-06-04

## 2024-06-04 RX ORDER — PREDNISONE 5 MG/1
5 TABLET ORAL DAILY
COMMUNITY

## 2024-06-04 RX ORDER — ACYCLOVIR 400 MG/1
400 TABLET ORAL 2 TIMES DAILY
COMMUNITY

## 2024-06-04 RX ORDER — ACETAMINOPHEN 325 MG/1
2 TABLET ORAL EVERY 6 HOURS PRN
COMMUNITY

## 2024-06-04 RX ORDER — CLOZAPINE 50 MG/1
50 TABLET ORAL NIGHTLY
Status: DISCONTINUED | OUTPATIENT
Start: 2024-06-04 | End: 2024-06-04

## 2024-06-04 RX ORDER — ALLOPURINOL 100 MG/1
100 TABLET ORAL DAILY
Status: DISCONTINUED | OUTPATIENT
Start: 2024-06-04 | End: 2024-06-04

## 2024-06-04 RX ORDER — CHOLECALCIFEROL (VITAMIN D3) 25 MCG
1000 TABLET ORAL DAILY
COMMUNITY
End: 2024-06-15 | Stop reason: HOSPADM

## 2024-06-04 RX ORDER — LEVOTHYROXINE SODIUM 50 UG/1
50 TABLET ORAL EVERY MORNING
COMMUNITY

## 2024-06-04 RX ORDER — AMOXICILLIN 250 MG
2 CAPSULE ORAL 2 TIMES DAILY
Status: DISCONTINUED | OUTPATIENT
Start: 2024-06-04 | End: 2024-06-15 | Stop reason: HOSPADM

## 2024-06-04 RX ORDER — PROPOFOL 10 MG/ML
5-50 INJECTION, EMULSION INTRAVENOUS CONTINUOUS
Status: DISCONTINUED | OUTPATIENT
Start: 2024-06-04 | End: 2024-06-04

## 2024-06-04 RX ORDER — DEXTROSE 50 % IN WATER (D50W) INTRAVENOUS SYRINGE
Status: DISPENSED
Start: 2024-06-04 | End: 2024-06-04

## 2024-06-04 RX ORDER — FUROSEMIDE 10 MG/ML
40 INJECTION INTRAMUSCULAR; INTRAVENOUS ONCE
Status: COMPLETED | OUTPATIENT
Start: 2024-06-04 | End: 2024-06-04

## 2024-06-04 RX ORDER — ROCURONIUM BROMIDE 10 MG/ML
INJECTION, SOLUTION INTRAVENOUS
Status: COMPLETED
Start: 2024-06-04 | End: 2024-06-04

## 2024-06-04 RX ORDER — FENTANYL CITRATE 50 UG/ML
INJECTION, SOLUTION INTRAMUSCULAR; INTRAVENOUS
Status: DISPENSED
Start: 2024-06-04 | End: 2024-06-05

## 2024-06-04 RX ORDER — FENTANYL CITRATE-0.9 % NACL/PF 10 MCG/ML
PLASTIC BAG, INJECTION (ML) INTRAVENOUS
Status: COMPLETED
Start: 2024-06-04 | End: 2024-06-04

## 2024-06-04 RX ORDER — CLOZAPINE 50 MG/1
50 TABLET ORAL NIGHTLY
COMMUNITY
End: 2024-06-15 | Stop reason: HOSPADM

## 2024-06-04 RX ORDER — DESVENLAFAXINE 100 MG/1
100 TABLET, EXTENDED RELEASE ORAL DAILY
Status: DISCONTINUED | OUTPATIENT
Start: 2024-06-04 | End: 2024-06-04

## 2024-06-04 RX ORDER — LEVOTHYROXINE SODIUM 50 UG/1
50 TABLET ORAL EVERY MORNING
Status: DISCONTINUED | OUTPATIENT
Start: 2024-06-04 | End: 2024-06-15 | Stop reason: HOSPADM

## 2024-06-04 RX ORDER — FOLIC ACID 1 MG/1
1 TABLET ORAL DAILY
COMMUNITY

## 2024-06-04 RX ORDER — BUMETANIDE 1 MG/1
1 TABLET ORAL DAILY
Status: DISCONTINUED | OUTPATIENT
Start: 2024-06-04 | End: 2024-06-04

## 2024-06-04 RX ORDER — BUMETANIDE 1 MG/1
1 TABLET ORAL DAILY
COMMUNITY
End: 2024-06-15 | Stop reason: HOSPADM

## 2024-06-04 RX ORDER — MIDAZOLAM HYDROCHLORIDE 1 MG/ML
4 INJECTION INTRAMUSCULAR; INTRAVENOUS ONCE
Status: DISCONTINUED | OUTPATIENT
Start: 2024-06-04 | End: 2024-06-06

## 2024-06-04 RX ORDER — DEXMEDETOMIDINE HYDROCHLORIDE 4 UG/ML
INJECTION, SOLUTION INTRAVENOUS
Status: COMPLETED
Start: 2024-06-04 | End: 2024-06-04

## 2024-06-04 RX ORDER — ETOMIDATE 2 MG/ML
20 INJECTION INTRAVENOUS ONCE
Status: COMPLETED | OUTPATIENT
Start: 2024-06-04 | End: 2024-06-04

## 2024-06-04 RX ORDER — ROCURONIUM BROMIDE 10 MG/ML
1 INJECTION, SOLUTION INTRAVENOUS ONCE
Status: COMPLETED | OUTPATIENT
Start: 2024-06-04 | End: 2024-06-04

## 2024-06-04 RX ORDER — ACYCLOVIR 400 MG/1
400 TABLET ORAL 2 TIMES DAILY
Status: DISCONTINUED | OUTPATIENT
Start: 2024-06-04 | End: 2024-06-04

## 2024-06-04 RX ORDER — DESVENLAFAXINE 100 MG/1
100 TABLET, EXTENDED RELEASE ORAL DAILY
COMMUNITY

## 2024-06-04 RX ORDER — MYCOPHENOLATE MOFETIL 500 MG/1
1000 TABLET ORAL 2 TIMES DAILY
COMMUNITY

## 2024-06-04 RX ORDER — DOCUSATE SODIUM 100 MG/1
100 CAPSULE, LIQUID FILLED ORAL NIGHTLY PRN
COMMUNITY
End: 2024-06-15 | Stop reason: HOSPADM

## 2024-06-04 RX ORDER — OXYCODONE AND ACETAMINOPHEN 5; 325 MG/1; MG/1
1 TABLET ORAL 4 TIMES DAILY PRN
COMMUNITY

## 2024-06-04 RX ORDER — IPRATROPIUM BROMIDE AND ALBUTEROL SULFATE 2.5; .5 MG/3ML; MG/3ML
3 SOLUTION RESPIRATORY (INHALATION)
Status: DISCONTINUED | OUTPATIENT
Start: 2024-06-04 | End: 2024-06-05

## 2024-06-04 RX ORDER — DEXMEDETOMIDINE HYDROCHLORIDE 4 UG/ML
.2-1.5 INJECTION, SOLUTION INTRAVENOUS CONTINUOUS
Status: DISCONTINUED | OUTPATIENT
Start: 2024-06-04 | End: 2024-06-06

## 2024-06-04 RX ADMIN — DEXMEDETOMIDINE HYDROCHLORIDE 0.2 MCG/KG/HR: 4 INJECTION, SOLUTION INTRAVENOUS at 13:55

## 2024-06-04 RX ADMIN — SENNOSIDES AND DOCUSATE SODIUM 2 TABLET: 50; 8.6 TABLET ORAL at 20:13

## 2024-06-04 RX ADMIN — PERFLUTREN 4 ML OF DILUTION: 6.52 INJECTION, SUSPENSION INTRAVENOUS at 15:43

## 2024-06-04 RX ADMIN — MAGNESIUM SULFATE HEPTAHYDRATE 4 G: 40 INJECTION, SOLUTION INTRAVENOUS at 02:17

## 2024-06-04 RX ADMIN — ROCURONIUM BROMIDE 100 MG: 10 INJECTION, SOLUTION INTRAVENOUS at 13:52

## 2024-06-04 RX ADMIN — IOHEXOL 75 ML: 350 INJECTION, SOLUTION INTRAVENOUS at 16:51

## 2024-06-04 RX ADMIN — SODIUM PHOSPHATE, MONOBASIC, MONOHYDRATE AND SODIUM PHOSPHATE, DIBASIC, ANHYDROUS 15 MMOL: 142; 276 INJECTION, SOLUTION INTRAVENOUS at 17:21

## 2024-06-04 RX ADMIN — SODIUM CHLORIDE, POTASSIUM CHLORIDE, SODIUM LACTATE AND CALCIUM CHLORIDE 500 ML: 600; 310; 30; 20 INJECTION, SOLUTION INTRAVENOUS at 20:12

## 2024-06-04 RX ADMIN — ETOMIDATE 20 MG: 20 INJECTION, SOLUTION INTRAVENOUS at 13:51

## 2024-06-04 RX ADMIN — MEROPENEM 1 G: 1 INJECTION, POWDER, FOR SOLUTION INTRAVENOUS at 23:16

## 2024-06-04 RX ADMIN — DEXTROSE MONOHYDRATE 25 G: 25 INJECTION, SOLUTION INTRAVENOUS at 10:36

## 2024-06-04 RX ADMIN — SODIUM CHLORIDE, POTASSIUM CHLORIDE, SODIUM LACTATE AND CALCIUM CHLORIDE 500 ML: 600; 310; 30; 20 INJECTION, SOLUTION INTRAVENOUS at 14:21

## 2024-06-04 RX ADMIN — CYCLOSPORINE 100 MG: 100 SOLUTION ORAL at 23:18

## 2024-06-04 RX ADMIN — ROCURONIUM BROMIDE 100 MG: 10 INJECTION INTRAVENOUS at 13:52

## 2024-06-04 RX ADMIN — DEXTROSE AND SODIUM CHLORIDE 150 ML/HR: 5; 900 INJECTION, SOLUTION INTRAVENOUS at 00:46

## 2024-06-04 RX ADMIN — DEXTROSE MONOHYDRATE 25 G: 25 INJECTION, SOLUTION INTRAVENOUS at 06:17

## 2024-06-04 RX ADMIN — DEXTROSE MONOHYDRATE 25 G: 25 INJECTION, SOLUTION INTRAVENOUS at 01:02

## 2024-06-04 RX ADMIN — DEXTROSE MONOHYDRATE 12.5 G: 25 INJECTION, SOLUTION INTRAVENOUS at 15:57

## 2024-06-04 RX ADMIN — POTASSIUM CHLORIDE 20 MEQ: 14.9 INJECTION, SOLUTION INTRAVENOUS at 09:57

## 2024-06-04 RX ADMIN — FUROSEMIDE 40 MG: 10 INJECTION, SOLUTION INTRAMUSCULAR; INTRAVENOUS at 05:30

## 2024-06-04 RX ADMIN — POLYETHYLENE GLYCOL 3350 17 G: 17 POWDER, FOR SOLUTION ORAL at 20:12

## 2024-06-04 RX ADMIN — MEROPENEM 1 G: 1 INJECTION, POWDER, FOR SOLUTION INTRAVENOUS at 13:10

## 2024-06-04 RX ADMIN — ACYCLOVIR SODIUM 500 MG: 50 INJECTION, SOLUTION INTRAVENOUS at 20:12

## 2024-06-04 RX ADMIN — Medication 25 MCG/HR: at 21:10

## 2024-06-04 RX ADMIN — SIMVASTATIN 40 MG: 40 TABLET, FILM COATED ORAL at 21:27

## 2024-06-04 RX ADMIN — IPRATROPIUM BROMIDE AND ALBUTEROL SULFATE 3 ML: .5; 3 SOLUTION RESPIRATORY (INHALATION) at 22:32

## 2024-06-04 RX ADMIN — MAGNESIUM SULFATE HEPTAHYDRATE 2 G: 40 INJECTION, SOLUTION INTRAVENOUS at 00:15

## 2024-06-04 RX ADMIN — DEXMEDETOMIDINE HYDROCHLORIDE 0.2 MCG/KG/HR: 400 INJECTION INTRAVENOUS at 13:55

## 2024-06-04 RX ADMIN — DEXTROSE MONOHYDRATE 12.5 G: 25 INJECTION, SOLUTION INTRAVENOUS at 21:10

## 2024-06-04 RX ADMIN — VANCOMYCIN HYDROCHLORIDE 1000 MG: 1 INJECTION, SOLUTION INTRAVENOUS at 01:09

## 2024-06-04 RX ADMIN — VANCOMYCIN HYDROCHLORIDE 1000 MG: 1 INJECTION, SOLUTION INTRAVENOUS at 02:09

## 2024-06-04 RX ADMIN — ETOMIDATE 20 MG: 2 INJECTION INTRAVENOUS at 13:51

## 2024-06-04 RX ADMIN — Medication 40 PERCENT: at 20:00

## 2024-06-04 RX ADMIN — HEPARIN SODIUM 1744.2 UNITS/HR: 10000 INJECTION, SOLUTION INTRAVENOUS at 05:36

## 2024-06-04 RX ADMIN — DEXTROSE 50 % IN WATER (D50W) INTRAVENOUS SYRINGE 25 G: at 01:02

## 2024-06-04 SDOH — SOCIAL STABILITY: SOCIAL INSECURITY: DO YOU FEEL UNSAFE GOING BACK TO THE PLACE WHERE YOU ARE LIVING?: UNABLE TO ASSESS

## 2024-06-04 SDOH — ECONOMIC STABILITY: INCOME INSECURITY
IN THE LAST 12 MONTHS, WAS THERE A TIME WHEN YOU WERE NOT ABLE TO PAY THE MORTGAGE OR RENT ON TIME?: PATIENT UNABLE TO ANSWER

## 2024-06-04 SDOH — SOCIAL STABILITY: SOCIAL INSECURITY: DOES ANYONE TRY TO KEEP YOU FROM HAVING/CONTACTING OTHER FRIENDS OR DOING THINGS OUTSIDE YOUR HOME?: UNABLE TO ASSESS

## 2024-06-04 SDOH — SOCIAL STABILITY: SOCIAL INSECURITY: ABUSE: ADULT

## 2024-06-04 SDOH — SOCIAL STABILITY: SOCIAL INSECURITY: HAS ANYONE EVER THREATENED TO HURT YOUR FAMILY OR YOUR PETS?: UNABLE TO ASSESS

## 2024-06-04 SDOH — ECONOMIC STABILITY: INCOME INSECURITY
HOW HARD IS IT FOR YOU TO PAY FOR THE VERY BASICS LIKE FOOD, HOUSING, MEDICAL CARE, AND HEATING?: PATIENT UNABLE TO ANSWER

## 2024-06-04 SDOH — SOCIAL STABILITY: SOCIAL INSECURITY: WERE YOU ABLE TO COMPLETE ALL THE BEHAVIORAL HEALTH SCREENINGS?: NO

## 2024-06-04 SDOH — HEALTH STABILITY: MENTAL HEALTH: HOW MANY STANDARD DRINKS CONTAINING ALCOHOL DO YOU HAVE ON A TYPICAL DAY?: PATIENT UNABLE TO ANSWER

## 2024-06-04 SDOH — SOCIAL STABILITY: SOCIAL INSECURITY: ARE YOU OR HAVE YOU BEEN THREATENED OR ABUSED PHYSICALLY, EMOTIONALLY, OR SEXUALLY BY ANYONE?: UNABLE TO ASSESS

## 2024-06-04 SDOH — ECONOMIC STABILITY: TRANSPORTATION INSECURITY
IN THE PAST 12 MONTHS, HAS LACK OF TRANSPORTATION KEPT YOU FROM MEETINGS, WORK, OR FROM GETTING THINGS NEEDED FOR DAILY LIVING?: PATIENT UNABLE TO ANSWER

## 2024-06-04 SDOH — ECONOMIC STABILITY: TRANSPORTATION INSECURITY
IN THE PAST 12 MONTHS, HAS THE LACK OF TRANSPORTATION KEPT YOU FROM MEDICAL APPOINTMENTS OR FROM GETTING MEDICATIONS?: PATIENT UNABLE TO ANSWER

## 2024-06-04 SDOH — HEALTH STABILITY: MENTAL HEALTH: HOW OFTEN DO YOU HAVE A DRINK CONTAINING ALCOHOL?: PATIENT UNABLE TO ANSWER

## 2024-06-04 SDOH — SOCIAL STABILITY: SOCIAL INSECURITY: ARE THERE ANY APPARENT SIGNS OF INJURIES/BEHAVIORS THAT COULD BE RELATED TO ABUSE/NEGLECT?: UNABLE TO ASSESS

## 2024-06-04 SDOH — ECONOMIC STABILITY: HOUSING INSECURITY

## 2024-06-04 SDOH — HEALTH STABILITY: MENTAL HEALTH: HOW OFTEN DO YOU HAVE 6 OR MORE DRINKS ON ONE OCCASION?: PATIENT UNABLE TO ANSWER

## 2024-06-04 SDOH — SOCIAL STABILITY: SOCIAL INSECURITY: DO YOU FEEL ANYONE HAS EXPLOITED OR TAKEN ADVANTAGE OF YOU FINANCIALLY OR OF YOUR PERSONAL PROPERTY?: UNABLE TO ASSESS

## 2024-06-04 SDOH — ECONOMIC STABILITY: HOUSING INSECURITY
IN THE LAST 12 MONTHS, WAS THERE A TIME WHEN YOU DID NOT HAVE A STEADY PLACE TO SLEEP OR SLEPT IN A SHELTER (INCLUDING NOW)?: PATIENT UNABLE TO ANSWER

## 2024-06-04 SDOH — SOCIAL STABILITY: SOCIAL INSECURITY: HAVE YOU HAD ANY THOUGHTS OF HARMING ANYONE ELSE?: UNABLE TO ASSESS

## 2024-06-04 SDOH — ECONOMIC STABILITY: HOUSING INSECURITY: IN THE LAST 12 MONTHS, HOW MANY PLACES HAVE YOU LIVED?: 1

## 2024-06-04 SDOH — SOCIAL STABILITY: SOCIAL INSECURITY: HAVE YOU HAD THOUGHTS OF HARMING ANYONE ELSE?: UNABLE TO ASSESS

## 2024-06-04 ASSESSMENT — COGNITIVE AND FUNCTIONAL STATUS - GENERAL
MOBILITY SCORE: 9
STANDING UP FROM CHAIR USING ARMS: TOTAL
HELP NEEDED FOR BATHING: A LOT
PERSONAL GROOMING: A LOT
DRESSING REGULAR LOWER BODY CLOTHING: A LOT
EATING MEALS: A LOT
TURNING FROM BACK TO SIDE WHILE IN FLAT BAD: A LOT
TOILETING: A LOT
MOVING TO AND FROM BED TO CHAIR: A LOT
CLIMB 3 TO 5 STEPS WITH RAILING: TOTAL
WALKING IN HOSPITAL ROOM: TOTAL
DRESSING REGULAR UPPER BODY CLOTHING: A LOT
DAILY ACTIVITIY SCORE: 12
MOVING FROM LYING ON BACK TO SITTING ON SIDE OF FLAT BED WITH BEDRAILS: A LOT

## 2024-06-04 ASSESSMENT — LIFESTYLE VARIABLES
AUDIT-C TOTAL SCORE: -1
AUDIT-C TOTAL SCORE: -1
HOW OFTEN DO YOU HAVE A DRINK CONTAINING ALCOHOL: PATIENT UNABLE TO ANSWER
AUDIT-C TOTAL SCORE: -1
HOW OFTEN DO YOU HAVE 6 OR MORE DRINKS ON ONE OCCASION: PATIENT UNABLE TO ANSWER
SKIP TO QUESTIONS 9-10: 0
SKIP TO QUESTIONS 9-10: 0
AUDIT-C TOTAL SCORE: -1
SKIP TO QUESTIONS 9-10: 0
HOW MANY STANDARD DRINKS CONTAINING ALCOHOL DO YOU HAVE ON A TYPICAL DAY: PATIENT UNABLE TO ANSWER

## 2024-06-04 ASSESSMENT — PAIN SCALES - WONG BAKER
WONGBAKER_NUMERICALRESPONSE: NO HURT

## 2024-06-04 ASSESSMENT — PAIN SCALES - PAIN ASSESSMENT IN ADVANCED DEMENTIA (PAINAD)
FACIALEXPRESSION: SMILING OR INEXPRESSIVE
TOTALSCORE: 0
CONSOLABILITY: NO NEED TO CONSOLE
BODYLANGUAGE: RELAXED
BREATHING: NORMAL

## 2024-06-04 ASSESSMENT — PAIN INTENSITY VAS: VAS_PAIN_COMPLEXVITALS_IP_ICU: 0

## 2024-06-04 ASSESSMENT — PAIN - FUNCTIONAL ASSESSMENT
PAIN_FUNCTIONAL_ASSESSMENT: CPOT (CRITICAL CARE PAIN OBSERVATION TOOL)
PAIN_FUNCTIONAL_ASSESSMENT: CPOT (CRITICAL CARE PAIN OBSERVATION TOOL)
PAIN_FUNCTIONAL_ASSESSMENT: 0-10

## 2024-06-04 ASSESSMENT — COLUMBIA-SUICIDE SEVERITY RATING SCALE - C-SSRS
2. HAVE YOU ACTUALLY HAD ANY THOUGHTS OF KILLING YOURSELF?: NO
6. HAVE YOU EVER DONE ANYTHING, STARTED TO DO ANYTHING, OR PREPARED TO DO ANYTHING TO END YOUR LIFE?: NO
1. IN THE PAST MONTH, HAVE YOU WISHED YOU WERE DEAD OR WISHED YOU COULD GO TO SLEEP AND NOT WAKE UP?: NO

## 2024-06-04 ASSESSMENT — PAIN SCALES - GENERAL
PAINLEVEL_OUTOF10: 0 - NO PAIN

## 2024-06-04 ASSESSMENT — PATIENT HEALTH QUESTIONNAIRE - PHQ9
SUM OF ALL RESPONSES TO PHQ9 QUESTIONS 1 & 2: 0
2. FEELING DOWN, DEPRESSED OR HOPELESS: NOT AT ALL
1. LITTLE INTEREST OR PLEASURE IN DOING THINGS: NOT AT ALL

## 2024-06-04 NOTE — SIGNIFICANT EVENT
Rapid Response RN Note    Rapid response RN paged to bedside by primary nurse Luis after second incidence of hypoglycemia s/p x2 doses of D50 pushes and decreased LOC/AMS. Pt was recently admitted from OSH and had just underwent admission chest xray, labwork, blood cultures, etc., when she was found to be hypoglycemic in the 30's w/decreased LOC and new oxygen requirements.      On arrival to the room, Resident Charan Freire, NACR Tani Gary, and nurse Luis Cordova were at bedside.  CXR completed and pending read. Vitals signs were stable.  ABG was collected by RRT RT Roa and read by MD.  No indications present for NIV. Pt was treated for sepsis with LR bolus and also started on maintenance fluids of D5 NS for the hypoglycemia. Antibiotics pending. Additional PIV was placed in right FA using ultrasound.  Plan per NACR and primary team will be for patient to remain on the unit since she is HDS and monitor for therapeutic response. Nursing staff to page rapid response with new concerns or with clinical deterioration.

## 2024-06-04 NOTE — SIGNIFICANT EVENT
"Rapid response follow up/rounding; came to bedside, pt awake & when asked how she felt, she stated that she felt \"better\"; breathing not as labored as earlier but some audible wheezing noted; MIVF still off since lasix given; pt recently started on heparin drip as ordered; pt with moist cough; encouraged pt to cough & deep breathe; VS's=36.6, 98, 22, 133/75 & 95% on 4L NC; previous gas with pH=7.21; spoke with & asked bedside RN to notify team & strongly suggested a repeat VBG/ABG to follow up; pt remains on continuous tele & POX  "

## 2024-06-04 NOTE — CONSULTS
Vancomycin Dosing by Pharmacy- INITIAL    Lea Paulino is a 69 y.o. year old female who Pharmacy has been consulted for vancomycin dosing for sepsis/UTI. Based on the patient's indication and renal status this patient will be dosed based on a goal trough/random level of 15-20.     Renal function is currently declining. Will dose by level.    Visit Vitals  /90 (BP Location: Right arm, Patient Position: Sitting)   Pulse 93   Temp 36.7 °C (98.1 °F) (Temporal)   Resp 24        Lab Results   Component Value Date    CREATININE 1.70 (H) 2024    CREATININE 1.50 2023    CREATININE 1.04 2022    CREATININE 1.41 (H) 2022    CREATININE 1.62 (H) 2022    CREATININE 1.96 (H) 2022        Patient weight is as follows:   Vitals:    24 1934   Weight: 96.9 kg (213 lb 10 oz)       Cultures:  No results found for the encounter in last 14 days.        No intake/output data recorded.  I/O during current shift:  I/O this shift:  In: 1427.5 [I.V.:127.5; IV Piggyback:1300]  Out: 200 [Urine:200]    Temp (24hrs), Av.6 °C (97.8 °F), Min:36.4 °C (97.5 °F), Max:36.7 °C (98.1 °F)         Assessment/Plan     Patient will be given a loading dose of 2000 mg.  Will dose by level.  Follow-up level will be ordered on  at 0000, unless clinically indicated sooner.  Will continue to monitor renal function daily while on vancomycin and order serum creatinine at least every 48 hours if not already ordered.  Follow for continued vancomycin needs, clinical response, and signs/symptoms of toxicity.       CLEMENTINA LEVIN, PharmD

## 2024-06-04 NOTE — CARE PLAN
The patient's goals for the shift include      The clinical goals for the shift include Patient will remain HDS during the shift    Over the shift, the patient did stay stable until transferred to a new unit.

## 2024-06-04 NOTE — PROGRESS NOTES
MEDICINE INPATIENT PROGRESS NOTE      Lea Paulino is a 69 y.o. female on day 1 of admission presenting with Pyelonephritis.    Subjective     HPI:  Lea Paulino is a 69 y.o. female with a history of COPD, renal transplant in 2015 on cyclosporine, mycophenolate and prednisone, HTN, HLD, hypothyroid, hyperparathyroid, HFmEF (45-50%), AFib, PVD, lymphedema, admitted for emphysematous pyelonephritis and acute pancreatitis.      Patient initially presented to [facility] on 6/[facility]/2024 for epigastric pain and dysuria and fatigue, had couple of months of leg pain, had epidural steroid injection few days ago with pain management.  Workup showed worsening creatinine at 2.7 (baseline 1.5-1.7), calcium 15.4 (up from 10.1 in 8), lactic acid normal, troponin 66.61, lipase 1033, WBC 15.2, urinalysis with moderate leukocyte esterasse, tiny stone in right lower quadrant transplant kidney, CT abdomen pelvis showing pancreatic inflammation, large stool burden with gas in collecting system and urinary bladder which was concerning for an infection with a gas-forming organism.  Patient was diagnosed with acute pancreatitis and was n.p.o. and started on IV fluids.  ID consulted and patient was initially on Vanco Zosyn and then was given doses of meropenem and gentamicin.  Cardiology consulted for a reported nonsustained VT of unclear duration and recommended increasing beta-blocker and repeat echo.  Nephrology consulted and recommended continued IV resuscitation, continue current immunosuppressive regimen hypercalcemia workup.  Patient transferred for further management given she had her renal transplant here in 2015.     Upon admission, patient was unable to provide further history, she is alert and oriented x1.  Looks uncomfortable laying in bed no signs of abdominal guarding, hemodynamically stable. Glucose found to be 37 and an amp of dextrose given. Transplant ID consulted for guidance on antibiotics.   Transplant nephrology aware of patient.     Soon after admission, code margie was called for patient's VBG showing pH 7.23 and pCO2 66 with a lactate of 1.0 and patient only AAO x 1, glucose 36.  ABG was obtained showing pH 7.41, pCO2 56, pO2 80, lactate 0.7, HCO3 28 while patient on 4 L nasal cannula.  Patient was given 1 L LR bolus and started on maintenance fluids with normal saline 150 mL/h.  Patient received 2 A of D50.  Patient hemodynamically stable with temperature 36.7 °C, heart rate 93, respiratory rate 24, blood pressure 133/90. Mg found to be 1,.3 and 6 g IV magnesium ordered.     Patient was later wet on physical exam with concerns of fluid overload, maintenance fluids held temporarily and IV Lasix 40 mg given.     Significant Event Note:  Reason for rapid response:   Called to bedside by RN for minimally responsive patient. Upon arrival RT at bedside, patient placed on BIPAP due to known pulmonary edema. Patient overnight with several episodes hypoglycemia (lowest BG 36). BG 61 per RN. She was receiving an amp of D50. After D50 administration, patient minimally responsive to sternal rubs. Opened eyes briefly and was tracking appropriately.      Vital signs on arrival:   148/85, 96, 18, 97.5.     Physical Exam:   Neuro: ANOx1  Heart: RRR, no murmurs   Lungs: Course BS throughout lung field. Crackles at bases bilaterally   Abd: NT/ND, hypoactive bowel sounds  LE: No edema, warm and well perfused. Pulses palpable b/l.    Intervention & Response:   ABG drawn, and patient was placed on BIPAP W/ 40% FIO2. After D50, patient opened eyes, and tracking, however still somnolent.  Family update:   Spoke to her son Cesar Maxwell, and he is aware mother is in the ICU. Also told son to expect a call from MICU team.  Outcome:   Given worsening mentation and respiratory status, decision was made to transfer patient to the ICU for close monitoring and management.           Objective     Last Recorded Vitals  Blood pressure  "133/75, pulse 98, temperature 36.6 °C (97.9 °F), temperature source Temporal, resp. rate 22, height 1.575 m (5' 2\"), weight 96.9 kg (213 lb 10 oz), SpO2 95%.  Intake/Output last 3 Shifts:  I/O last 3 completed shifts:  In: 2467.9 (25.5 mL/kg) [I.V.:967.9 (10 mL/kg); IV Piggyback:1500]  Out: 850 (8.8 mL/kg) [Urine:850 (0.2 mL/kg/hr)]  Weight: 96.9 kg       Relevant Results    Labs    Results from last 7 days   Lab Units 06/04/24  0225   WBC AUTO x10*3/uL 20.0*   HEMOGLOBIN g/dL 12.2   HEMATOCRIT % 39.9   PLATELETS AUTO x10*3/uL 111*            Results from last 7 days   Lab Units 06/04/24  0350 06/03/24  2153   SODIUM mmol/L 142 146*   POTASSIUM mmol/L 3.4* 4.2   CHLORIDE mmol/L 109* 109*   CO2 mmol/L 28 27   BUN mg/dL 26* 30*   CREATININE mg/dL 1.42* 1.70*   CALCIUM mg/dL 9.3 9.5     Results from last 7 days   Lab Units 06/04/24  0350 06/03/24  2153   ALK PHOS U/L 48 57   BILIRUBIN TOTAL mg/dL 0.5 0.6   PROTEIN TOTAL g/dL 4.9* 5.2*   ALT U/L 7 7   AST U/L 12 18      Results from last 7 days   Lab Units 06/04/24  0350   APTT seconds 29   INR  1.5*        Medications  Scheduled medications  dextrose, , ,   ipratropium-albuteroL, 3 mL, nebulization, q6h  levothyroxine, 50 mcg, oral, q AM  meropenem, 1 g, intravenous, q12h  mycophenolate, 500 mg, oral, BID  polyethylene glycol, 17 g, oral, Daily  predniSONE, 5 mg, oral, Daily  sennosides, 1 tablet, oral, Nightly  simvastatin, 40 mg, oral, Nightly      Continuous medications  D5 % and 0.9 % sodium chloride, 150 mL/hr, Last Rate: Stopped (06/04/24 0530)  heparin, 0-4,500 Units/hr, Last Rate: 1,744.2 Units/hr (06/04/24 7113)      PRN medications  PRN medications: dextrose, heparin, vancomycin     Imaging  XR chest 1 view         XR abdomen 1 view                  Assessment/Plan   Principal Problem:    Pyelonephritis    Lea LAURA Paulino is a 69 y.o. female with a history of COPD, renal transplant in 2015 on cyclosporine, mycophenolate and prednisone, HTN, HLD, " hypothyroid, hyperparathyroid, HFmEF (45-50%), AFib, PVD, lymphedema, admitted for emphysematous pyelonephritis and acute pancreatitis in the setting of severe hypercalcemia.     #Acute pancreatitis  :: Possibly secondary to hypercalcemia (15.9) vs. medications including cyclosporine (less likely)  :: Lipase 1039, CT findings supportive, low back pain initial presentation    - Will give 1L LR and start maintenance fluids 150 mL/hr (less calcium)  - Diuresis if patient oxygen requirement increases  - Holding maintenance fluids temporarily and diuresing with Lasix 40 IV, resume fluids as tolerated  - Keep n.p.o.  - Pain control with IV medication  - Hold cyclosporine for now given possible culprit  - Follow-up random cyclosporine levels  - Consult GI to weigh in for discontinuing cyclosporine  - Consult endocrinology for hypercalcemia  - Follow up triglycerides  - Will need to confirm if patient was actually taking cyclosporine at home  - Strict I/O     #Emphysematous pyelonephritis  :: CT A/P with gas in the RLQ transplant kidney collecting system and in the urinary bladder, possible gas-forming infection  :: Suspected ureteral stone appears to be outside of the ureter. No hydronephrosis is seen. Unlikely to be culprit  :: Patient was on Vanco and Zosyn at outside hospital, reported use of meropenem and gentamicin  - Started vancomycin and meropenem 1 g every 8 hours  - Transplant ID consulted for guidance on antibiotic use  - Transplant nephrology aware of patient, consult formally in a.m.  - Follow-up infectious workup: Blood cultures, urine cultures, urinalysis    Urine Cultures:  - 1/12/2022 Enterococcus faecalis, resistant  to gentamicin, intermediate to doxycycline, sensitive to everything else   - 4/2024 Pseudomonas pan-susceptible   - 6/3/2024: Pending      #Hypercalcemia  :: s/p subtotal hemithyroidectomy & parathyroidectomy in 2006   :: Calcium 15.9 at [facility]  :: Potentially the culprit for her  presentation of AMS/confusion, pancreatitis, vomiting, large stool burden and constipation, and kidney stones  :: Consider parathyromatosis as possible etiology of this presentation given prior parathyroid surgery  :: Suppressed intact PTH, has a recent history of elevated PTH with mildly elevated calcium consistent with primary hyperparathyroidism evaluation at that time unremarkable  :: Given severity, was treated at [facility] with [place] calcitonin 4 units/kg subcutaneously and high-volume IV normal saline   - Follow up hypercalcemia evaluation including PTH-RP, SPEP, UPEP, 25-hydroxy vitamin D3, 1, 25 dihydroxy vitamin D3  - Consider sestamibi parathyroid scan/parathyroid ultrasound   - Running IVF for volume resuscitation   - Consult endocrinology in AM   - Bowel regimen      #Respiratory acidosis  ::Likely 2/2 to sepsis, obesity, COPD, possible RML atelectasis on xray  ::ABG: pH 7.31, pCO2 56, pO2 80, So2 97, HCO3 28  ::Patient is compensating for respiratory acidosis  -Continue to monitor with frequent VBG/ABG  -Consider BiPAP if patient has worsening mental status or VBG/ABG  -Consider bronchopulmonary hygiene for atelectasis      #Hypogylcemia  ::Concern for adrenal insufficiency while on steroids   ::Ammonia normal, TSH nl  -Will do D5 NS for maintenance fluids  -Follow up AM cortisol      #LAURA on CKD  :: s/p renal transplant in 2015  :: Follows with Dr Gay at  for renal transplant   :: On mycophenolate, cyclosporine, prednisone  :: Creatinine on admission to [facility] 2.7 (baseline 1.5-1.7)  :: LAURA likely prerenal in setting of pancreatitis and hypovolemia  - Decrease mycophenolate to 500 twice daily for now  - Follow up transplant nephrology recommendations for immunosuppression medications  - Continue prednisone  - Hold cyclosporine for now, follow-up transplant nephrology recommendations  - Urine electrolytes sent  - Follow-up urinalysis  - Continue to monitor creatinine      #Reported nonsustained VT at [facility]  #Hypertension  #HFmrEF  - Start metoprolol tartrate 12.5 QID tartrate  - Confirmed patient's metoprolol dose prior to admission  - Optimize electrolytes K > 4, Mg > 2  - Follow-up ECG  - Admit with telemetry  - Holding Bumex 1 mg for now  - Confirm if patient has been worked up for ischemic evaluation in the past  - Cautious given the fluids for pancreatitis  - Diurese prn      #Hx AFib  -Rate controlled with beta blocker  -Heparin infusion started given no concern for bleeding     #Hx of DVT LLE 2022  ::On Eliquis  -Heparin infusion started given no concern for bleeding     #COPD  - Continue inhaled treatments     #Hypothyroidism  - Continue levothyroxine     #HLD  - Holding atorvastatin for now     #Bipolar discorder  -Hold home Desvenlafaxine and Clozapine given AMS      #Miscellaneous   ::Asymptomatic HIV status. Hx hepatitis B and hepatitis C  - Follow up HIV test       F: PRN  E: PRN  N: Regular Diet  A: pIV    DVT ppx: Lovenox, SubQ Heparin, SCDs  GI ppx: Not indicated    CODE STATUS: Full Code   Surrogate Decision Maker: Cesar Maxwell (son) [phone]     Case seen and discussed with attending Dr. Shahid Rehman MD   Internal Medicine PGY-1

## 2024-06-04 NOTE — PROGRESS NOTES
Pharmacy Medication History Review    Lea Paulino is a 69 y.o. female admitted for Pyelonephritis. Pharmacy reviewed the patient's [id number] medications and allergies for accuracy.    The list below reflects the updated PTA list. Comments regarding how patient may be taking medications differently can be found in the Admit Orders Activity  Prior to Admission Medications   Prescriptions  Chart / Pharmacy Reported?   acetaminophen (Tylenol) 325 mg tablet  Yes   Sig: Take 2 tablets (650 mg) by mouth every 6 hours if needed.   acyclovir (Zovirax) 400 mg tablet  Yes   Sig: Take 1 tablet (400 mg) by mouth 2 times a day.  --> Not on [facility] PTA List. Last Fill 4/25/24, #60 / 30 day.   allopurinol (Zyloprim) 100 mg tablet  Yes   Sig: Take 1 tablet (100 mg) by mouth once daily.  --> Last Fill 12/18/23, 90 day. No longer taking (?)    apixaban (Eliquis) 5 mg tablet  Yes   Sig: Take 1 tablet (5 mg) by mouth 2 times a day.   bumetanide (Bumex) 1 mg tablet  Yes   Sig: Take 1 tablet (1 mg) by mouth once daily.   calcitriol (Rocaltrol) 0.5 mcg capsule  Yes   Sig: Take 1 capsule (0.5 mcg) by mouth 3 times a week. Mon, Wed, Fri  --> Unsure on current home sig. [facility] 3x/week. [place] last sig (3/14/24) daily. Entered per [facility] List.   cholecalciferol (Vitamin D3) 25 MCG (1000 UT) tablet  Yes   Sig: Take 1 tablet (1,000 Units) by mouth once daily.  --> Per [facility] PTA List. No fill history found.   cloZAPine (Clozaril) 50 mg tablet  Yes   Sig: Take 1 tablet (50 mg) by mouth once daily at bedtime.  --> Last  Fill per [facility] 4/30/24. Was active at [facility].   cycloSPORINE (SandIMMUNE) 100 mg capsule  Yes   Sig: Take 1 capsule (100 mg) by mouth 2 times a day.   desvenlafaxine 100 mg 24 hr tablet  Yes   Sig: Take 1 tablet (100 mg) by mouth once daily.  --> Last Fill 5/31/24 per [facility].  --> Per [facility], was giving venlafaxine 150 mg cap AM (?)   docusate sodium (Colace) 100 mg capsule  Yes   Sig: Take 1 capsule (100 mg)  "by mouth as needed at   bedtime for constipation.  --> Per [facility] PTA List.   ferrous sulfate, 325 mg ferrous sulfate, tablet  Yes   Sig: Take 1 tablet by mouth once daily with breakfast.  --> Last Fill 1/25/24, #90 / 90 day, pt says \"yes\" to taking.   folic acid (Folvite) 1 mg tablet  Yes   Sig: Take 1 tablet (1 mg) by mouth once daily.   levothyroxine (Synthroid, Levoxyl) 50 mcg tablet  Yes   Sig: Take 1 tablet (50 mcg) by mouth once daily in the morning.   metoprolol tartrate (Lopressor) 25 mg tablet  Yes   Sig: Take 0.5 tablet (12.5 mg) by mouth 2 times a day.   mycophenolate (Cellcept) 500 mg tablet  Yes   Sig: Take 2 tablets (1,000 mg) by mouth 2 times a day.   predniSONE (Deltasone) 5 mg tablet  Yes   Sig: Take 1 tablet (5 mg) by mouth once daily.   simvastatin (Zocor) 40 mg tablet  Yes   Sig: Take 1 tablet (40 mg) by mouth once daily at bedtime.  --> Given as atorvastatin 20 mg at [facility]   vancomycin (Vancocin) 125 mg capsule  Yes   Sig: Take 1 capsule (125 mg) by mouth 3 times a week. Mon, Wed, Fri      oxyCODONE-acetaminophen (Percocet) 5-325 mg tablet  Yes   Sig: Take 1 tablet by mouth 4 times a day as needed.  --> Last Fill 5/30/24, #120 / 30 day           The list below reflects the updated allergy list. Please review each documented allergy for additional clarification and justification.  Allergies  Reviewed by Charan Freire MD on 6/3/2024        Severity Reactions Comments    Macrodantin [nitrofurantoin Macrocrystal] Not Specified GI Upset     Sulfa (sulfonamide Antibiotics) Low Rash            Patient is a transfer from [facility].     Patient was unable to be assessed for M2B at discharge.   ** Pt confused on interview. Reassess when able.  Local Pharmacy per Dispense Report:   [address], [place], [phone]    Sources used to complete the med history include:  Pt Interview (attempted; current confusion, recognition with med name prompting, said \"yes\" to most medications, unsure if " reliable)  Epic Dispense Report (Pharmacy Fill History)  [facility] (CareEverywhere & Transfer Paperwork)  PTA List found in [facility] Transfer paper (printed 6/3/24, 3:29:24 PM).  [place]ARRS (5/30/24, oxy/APAP 5/325)    Below are additional concerns with the patient's PTA list:  See additional comments underneath medications in PTA Table above.   Medication Review represents best available list at this time. Primarily guided by [facility] Transfer paperwork and pharmacy fill history. As patient may be an unreliable historian due to current confusion, please further verify home compliance when able and appropriate.   Clozapine: Per [facility], last fill 4/30/24, #30 / 30 day. Patient does recognize/confirm on interview. Unsure of last dose. Medication was seen active per [facility] transfer paperwork.     ------------------------  Dilip Kenny, PharmD, [facility]  Transitions of Care Pharmacist  Medication reconciliation complete  Please reach out via [facility] Secure Chat for questions,   or if no response call [employer] or [employer].  [facility]     .

## 2024-06-04 NOTE — PROCEDURES
A time out was performed. I wore a mask and gloves throughout the procedure. The patient was placed on a cardiac monitor including continuous pulse oximetry.  The patient received 20mg of etomidate and 100mg rocuronium for the procedure.  Cricoid pressure was maintained from time sedative/analgesic agent was given to time of cuff balloon inflation. Using a glidescope and a size 7.5 endotracheal tube with stylet, the patient was intubated on the first attempt. The stylet was removed and cuff balloon was inflated. Appropriate endotracheal tube position was confirmed by direct visualization of vocal cord passage, fogging of the tube, CO2 colormetric indicator and symmetric breath sounds. The tube was secured at 22 cm at the lips. Post intubation chest x-ray is pending at this time.

## 2024-06-04 NOTE — HOSPITAL COURSE
Lea Paulino is a 69 y.o. female with a history of COPD, renal transplant (2015), HTN, HLD, hypothyroid, hyperparathyroidism s/p partial parathyroidectomy, HFmEF (45-50%), AFib, PVD, and lymphedema, who was 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. Hospital course c/b hypernatremia, hypoglycemia, AHRF 2/2 volume overload, and metabolic encephalopathy.     OSH Course:  Ms. Jarad Paulino was initially admitted to [facility] on June 1 with epigastric pain and dysuria.  Imaging and lipase c/w emphysematous pyelonephritis and acute pancreatitis.  Her workup at the time showed an elevated creatinine of 2.71 baseline is about 1.5-1.7 as well as hypercalcemia of 15.4.  She had a CT abdomen pelvis which showed pancreatic inflammation as well as gas in the collecting system and urinary bladder which was concerning for infection with gas-forming organism.  Patient was made n.p.o. and started on IV fluids.  Patient was started on vancomycin and Zosyn and then transition to meropenem and gentamicin.  Patient had episodes of nonsustained V. tach for which cardiology was consulted and recommended increasing beta-blocker and repeating an echo.  Per nephrology, patient was recommended IV resuscitation as well as current immunosuppressive regimen and hypercalcemia workup.      Patient transferred to  on 6/3 given history of renal transplant at .      Hospital Course:  On transfer from OSH, patient was found to be altered with BG of 37, for which she was given 2 A of D50. Seen by ID and started on vac/tahira for emphysematous pyelo. Evaluated by transplant neph for recommendations on immunosuppression regimen.    Overnight 6/3-6/4, she developed worsening acute respiratory distress with a VBG showing a pH of 7.23, CO2 66 and a lactate of 1.  Patient was placed on BiPAP.  X-ray chest done 6/3 showed right-sided pleural effusion with associated atelectasis/consolidation.      On arrival  to the ICU 6/4, patient intubated for airway protection given  There was suspicion for meningitis in MICU given recent epidural steroidinjection, so started on acyclovir. CTH and panspine completed showed no acute findings. CT A/P showed enlarged edematous pancreas without definite necrotic changes and few peripancreatic fluid collections without discrete wall. Few foci of gas within the transplant renal collecting system is likely secondary to patient's known emphysematous pyelonephritis and evidence of mucous plugging and/or aspiration involving the right lower and middle segmental bronchi with associated small right pleural effusion and collapse of the posterior right lower lobe. Given low clinical concern for meningitis, acyclovir was stopped. Patient was extubated on 6/6 with improvement in mentation and transferred to floor on 2.5L.    On 6/7, a rapid response was called for lethargy and increased work of breathing, found to be hypoglycemic with worsening pulmonary edema on CXR. Started on D5 drip, micafungin, airvo and diuresed with slight improvement in mentation and work of breathing. Endocrine consulted for persistent hypoglycemia, recommended starting tube feeds. DHT placed 6/8. Vanc and micafungin stopped 6/8, with plan to continue meropenem through 6/17 per discussion with ID.     Patient continued to have altered mentation and lethargy, despite appropriate antibiotic coverage and improvement in hypoglycemia. She underwent MRI brain 6/11, which was unremarkable for acute abnormality. Spot EEG additionally unremarkable for epileptic activity. Patient's encephalopathy ultimately felt to be a result of multiple metabolic derangements, superimposed on known cognitive impairment (confirmed by patient's son).     Patient gradually became more alert and conversant, appears now to be close to mental status baseline at time of admission (pleasantly confused, oriented x1). She will be discharged to skilled  nursing facility.      To follow-up at SNF:  -Meropenem through 6/17 for emphysematous pyelonephritis. Patient has PICC  -Continue tube feeds with free water flushes, tube feeds can be weaned as patient's mental status and PO intake continued to improve  -Continue  mg BID, cyclosporine 75 mg BID, prednisone 5 mg daily  -Continue Lokelma 10 mg daily, follow-up RFP and adjust accordingly)    To follow-up with PCP:  -Thickened endometrial stripe in uterus noted on CT

## 2024-06-04 NOTE — SIGNIFICANT EVENT
Rapid Response RN Note    Rapid response RN at bedside for RADAR score 7 due to the following VS: T 36.4 °Celsius; HR 97; RR 24; /81; SPO2 93% nasal cannula .     Reviewed above VS with bedside RN who had documented these VS.  Pt is stable and without new complaints.  Sepsis alert triggered lactate level repeat. VBG was sent after provider ordered it.        Staff to page rapid response for any concerns or acute change in condition/VS.

## 2024-06-04 NOTE — CONSULTS
Date of admission: 6/3/2024     Lea Paulino is a 69 y.o.  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 On admission, labs notable for hypercalcemia and LAURA for which transplant nephrology is following.     Overnight, code white called for patient due to altered mentation (AAO x1), BG 37 and acute hypercapnic respiratory failure requiring 4L -> bipap. CXR with concern for fluid overload and patient has since received 40 mg IVP lasix with response. This AM, patient appeared pretty lethargic and not responding to commands/unable to participate in the interview. Plan noted for Bipap and transfer to the MICU for further management.           Past Medical History:   Diagnosis Date    Asymptomatic human immunodeficiency virus (hiv) infection status (Multi) 04/12/2016    HIV, asymptomatic    Kidney transplant status ([facility])     Status post kidney transplant    Personal history of other infectious and parasitic diseases 04/12/2016    History of hepatitis B virus infection    Personal history of other infectious and parasitic diseases 04/12/2016    History of hepatitis C virus infection    Postprocedural hypoparathyroidism (Multi)     Status post subtotal parathyroidectomy    Postprocedural hypothyroidism     Status post partial thyroidectomy    Urinary tract infection, site not specified 04/08/2016    Acute UTI        PROBLEM LIST:  Principal Problem:    Pyelonephritis         ALLERGIES:  Allergies   Allergen Reactions    Macrodantin [Nitrofurantoin Macrocrystal] GI Upset    Sulfa (Sulfonamide Antibiotics) Rash            CURRENT MEDICATIONS:  Scheduled medications  ipratropium-albuteroL, 3 mL, nebulization, q6h  levothyroxine, 50 mcg, oral, q AM  meropenem, 1 g, intravenous, q12h  mycophenolate, 500 mg, oral,  "BID  polyethylene glycol, 17 g, oral, BID  predniSONE, 5 mg, oral, Daily  sennosides-docusate sodium, 2 tablet, oral, BID  simvastatin, 40 mg, oral, Nightly      Continuous medications  heparin, 0-4,500 Units/hr      PRN medications  PRN medications: dextrose, dextrose, glucagon, glucagon, heparin, vancomycin       OBJECTIVE:    VITALS: Visit Vitals  /77   Pulse 97   Temp 37.1 °C (98.8 °F) (Temporal)   Resp 16   Ht 1.575 m (5' 2\")   Wt 96.8 kg (213 lb 6.5 oz)   SpO2 100%   BMI 39.03 kg/m²   Smoking Status Unknown   BSA 2.06 m²        General: AAO x1  Mucosa moist   AI, AC, AF     HEENT: PEERLA  CVS: S1 S2 no murmurs  RESP:  diminished   ABDO: Soft, non-tender   Skin: No rash   Extremities: No edema       LABS:  Results from last 72 hours   Lab Units 06/04/24  1102 06/04/24  0350   WBC AUTO x10*3/uL 14.7*  --    HEMOGLOBIN g/dL 10.9*  --    MCV fL 119*  --    PLATELETS AUTO x10*3/uL 88*  --    BUN mg/dL  --  26*   CREATININE mg/dL  --  1.42*   CALCIUM mg/dL  --  9.3            Intake/Output Summary (Last 24 hours) at 6/4/2024 1301  Last data filed at 6/4/2024 1130  Gross per 24 hour   Intake 2535.78 ml   Output 1800 ml   Net 735.78 ml          ASSESSMENT AND PLAN:    Lea Paulino is a 69 y.o.  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 On admission, labs notable for hypercalcemia and LAURA for which transplant nephrology is following.     #Allograft function  Date of transplant: 10/30/2015  Baseline Cr: 1.5-1.7, admitted with a Cr of 2.7, today Cr 1.42   UA: 1+ protein, 2+ blood, > 50 WBC's and 11-20 RBC's  Imaging: CT A/P without contrast - RLQ transplanted kidney with a tiny non obstructive stone. Other findings notable for inflammation of the pancreatic head.   Urine output: 950 ml's this AM (s/p 1 IVP " lasix 40 overnight)     LAURA 2/2 pancreatitis, hypercalcemia induced hypovolemia/vasoconstriction and sepsis  - improving with IVF repletion   - IVC POCUS to eval intravascular volume status  - Recommend GI consult for pancreatitis and transplant ID consult, appreciate recommendations   - Repeat lipase levels (on admission elevated to 1033)   - Recommend urine culture   - BCX pending (pre-moise negative)  - avoid nephrotoxins, IV contrast and NSAIDs  - renally dose medications to CrCl     #Immunosuppression  - continue cyclosporine 100 BID  - decrease MMF to 500 BID  - continue prednisone 5 mg daily   - recommend cyclosporine levels  - although cyclosporine can cause pancreatitis - would continue at this moment   - recommend NGT for meds if unable to tolerate PO medications.     #Hemodynamics   - BP's reviewed and remain stable   - continue to monitor     #Electrolytes - hypercalcemia  - admission Ca 15.4, albumin 3.8, no phos levels  - iPTH appropriately low on admission, repeat 53.6  - Vitamin D 25 levels WNL, pending PTHrp and Vitamin D-1,25 OH   - daily ical, phos and mag levels   - hold home calcitriol   - overall improving with IVF repletion     #CKD-Anemia   - Hgb 10.9, will monitor    #Acid-base   - primary respiratory acidosis, management as per micu-pulm team     Will follow         Suyapa Duran DO   Nephrology fellow PGY 4   Available via [employer] Chat   Transplant pager #[phone]

## 2024-06-04 NOTE — CONSULTS
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 female with past medical history of renal transplant-in 2015, on cyclosporine, MMF, prednisone, hypertension, hyperlipidemia, COPD, CHF, A-fib, peripheral arterial disease, lymphedema was transferred from outside hospital for concerns of emphysematous pyelonephritis and acute pancreatitis.    Patient was at [facility] on 6/1/2024 with complaints of of epigastric pain, dysuria and fatigue. She was found to have leukocytosis of 15.2, creatinine of 2.7, UA revealed showed moderate leukocyte esterase, 3+ bacteria and 0-5 WBC. CT abdomen and pelvis revealed \"gas in the RLQ transplant kidney and evidence of pancreatitis\". Patient was started on IV vancomycin and pip-tazo and was then switched from piptazo to Meropenem as per ID recs given the CT findings concerning for emphysematous pyelitis.  Patient was then transferred to [facility] given the history of renal transplant.  As per the documentation at [facility]     Course at [facility]:  On presentation patient was alert and oriented x 1, was hemodynamically stable.  WBC count was 20.  CXR was done which showed right-sided pleural effusion with associated atelectasis/consolidation.  Cyclosporine was held.  Patient was started on IV vancomycin and IV meropenem to 8 hours.  Blood cultures, urine cultures and urinalysis were obtained.    Past Medical History  She has a past medical history of Asymptomatic human immunodeficiency virus (hiv) infection status (Multi) (04/12/2016), Kidney transplant status ([facility]), Personal history of other infectious and parasitic diseases (04/12/2016), Personal history of other infectious and parasitic diseases (04/12/2016), Postprocedural " hypoparathyroidism (Multi), Postprocedural hypothyroidism, and Urinary tract infection, site not specified (04/08/2016).    Surgical History  She has a past surgical history that includes Other surgical history (05/05/2016).     Social History     Occupational History    Not on file   Tobacco Use    Smoking status: Unknown    Smokeless tobacco: Not on file   Vaping Use    Vaping status: Every Day   Substance and Sexual Activity    Alcohol use: Not on file    Drug use: Not on file    Sexual activity: Not on file     Travel History   Travel since 05/04/24    No documented travel since 05/04/24         Family History  No family history on file.  Allergies  Macrodantin [nitrofurantoin macrocrystal] and Sulfa (sulfonamide antibiotics)     Immunization History   Administered Date(s) Administered    Moderna COVID-19 vaccine, Fall 2023, 12 yeasrs and older (50mcg/0.5mL) 09/24/2023    Pfizer COVID-19 vaccine, bivalent, age 12 years and older (30 mcg/0.3 mL) 09/12/2022    Pfizer Purple Cap SARS-CoV-2 01/28/2021, 02/18/2021, 08/18/2021     Medications  Home medications:  Medications Prior to Admission   Medication Sig Dispense Refill Last Dose    apixaban (Eliquis) 5 mg tablet Take 1 tablet (5 mg) by mouth 2 times a day.   6/3/2024    bumetanide (Bumex) 1 mg tablet Take 1 tablet (1 mg) by mouth once daily.   Past Week    calcitriol (Rocaltrol) 0.5 mcg capsule Take 1 capsule (0.5 mcg) by mouth 3 times a week. Mon, Wed, Fri   Past Week    cloZAPine (Clozaril) 50 mg tablet Take 1 tablet (50 mg) by mouth once daily at bedtime.   Past Week    cycloSPORINE (SandIMMUNE) 100 mg capsule Take 1 capsule (100 mg) by mouth 2 times a day.   6/3/2024    desvenlafaxine 100 mg 24 hr tablet Take 1 tablet (100 mg) by mouth once daily.   Past Week    ferrous sulfate, 325 mg ferrous sulfate, tablet Take 1 tablet by mouth once daily with breakfast.   6/3/2024    folic acid (Folvite) 1 mg tablet Take 1 tablet (1 mg) by mouth once daily.   6/3/2024     levothyroxine (Synthroid, Levoxyl) 50 mcg tablet Take 1 tablet (50 mcg) by mouth once daily in the morning.   6/3/2024    metoprolol tartrate (Lopressor) 25 mg tablet Take 0.5 tablets (12.5 mg) by mouth 2 times a day.   6/3/2024    mycophenolate (Cellcept) 500 mg tablet Take 2 tablets (1,000 mg) by mouth 2 times a day.   6/3/2024    oxyCODONE-acetaminophen (Percocet) 5-325 mg tablet Take 1 tablet by mouth 4 times a day as needed.   Past Week    predniSONE (Deltasone) 5 mg tablet Take 1 tablet (5 mg) by mouth once daily.   6/3/2024    simvastatin (Zocor) 40 mg tablet Take 1 tablet (40 mg) by mouth once daily at bedtime.   Past Week    vancomycin (Vancocin) 125 mg capsule Take 1 capsule (125 mg) by mouth 3 times a week. Mon, Wed, Fri   Past Week    acetaminophen (Tylenol) 325 mg tablet Take 2 tablets (650 mg) by mouth every 6 hours if needed.   Unknown    acyclovir (Zovirax) 400 mg tablet Take 1 tablet (400 mg) by mouth 2 times a day.   Unknown    allopurinol (Zyloprim) 100 mg tablet Take 1 tablet (100 mg) by mouth once daily.   Unknown    cholecalciferol (Vitamin D3) 25 MCG (1000 UT) tablet Take 1 tablet (1,000 Units) by mouth once daily.   Unknown    docusate sodium (Colace) 100 mg capsule Take 1 capsule (100 mg) by mouth as needed at bedtime for constipation.   Unknown     Current medications:  Scheduled medications  dextrose, , ,   ipratropium-albuteroL, 3 mL, nebulization, q6h  levothyroxine, 50 mcg, oral, q AM  meropenem, 1 g, intravenous, q12h  mycophenolate, 500 mg, oral, BID  polyethylene glycol, 17 g, oral, Daily  potassium chloride, 20 mEq, intravenous, q2h  predniSONE, 5 mg, oral, Daily  sennosides, 1 tablet, oral, Nightly  simvastatin, 40 mg, oral, Nightly      Continuous medications  D5 % and 0.9 % sodium chloride, 150 mL/hr, Last Rate: Stopped (06/04/24 0530)  heparin, 0-4,500 Units/hr, Last Rate: 1,744.2 Units/hr (06/04/24 7236)      PRN medications  PRN medications: dextrose, dextrose, dextrose,  "glucagon, glucagon, heparin, vancomycin    Review of Systems  Negative except as above    Objective  Range of Vitals (last 24 hours)  Heart Rate:  [93-98]   Temp:  [36.4 °C (97.5 °F)-36.7 °C (98.1 °F)]   Resp:  [20-24]   BP: (120-164)/(75-90)   Height:  [157.5 cm (5' 2\")]   Weight:  [96.9 kg (213 lb 10 oz)]   SpO2:  [93 %-95 %]   Daily Weight  06/03/24 : 96.9 kg (213 lb 10 oz)    Body mass index is 39.07 kg/m².     Physical Exam  General: Sick looking female, laying in bed.  CVS: S1/S2 audible, no M/G/R.  Resp: Gasping, coarse breath sounds in all lung fields.   CNS: AAO x0    Relevant Results  Outside Hospital Results  Yes -   Labs  Results from last 72 hours   Lab Units 06/04/24  0225   WBC AUTO x10*3/uL 20.0*   HEMOGLOBIN g/dL 12.2   HEMATOCRIT % 39.9   PLATELETS AUTO x10*3/uL 111*   NEUTROS PCT AUTO % 91.2   LYMPHS PCT AUTO % 2.3   MONOS PCT AUTO % 4.3   EOS PCT AUTO % 0.3     Results from last 72 hours   Lab Units 06/04/24  0350 06/03/24  2153   SODIUM mmol/L 142 146*   POTASSIUM mmol/L 3.4* 4.2   CHLORIDE mmol/L 109* 109*   CO2 mmol/L 28 27   BUN mg/dL 26* 30*   CREATININE mg/dL 1.42* 1.70*   GLUCOSE mg/dL 95 36*   CALCIUM mg/dL 9.3 9.5   ANION GAP mmol/L 8* 14   EGFR mL/min/1.73m*2 40* 33*     Results from last 72 hours   Lab Units 06/04/24  0350 06/03/24  2153   ALK PHOS U/L 48 57   BILIRUBIN TOTAL mg/dL 0.5 0.6   PROTEIN TOTAL g/dL 4.9* 5.2*   ALT U/L 7 7   AST U/L 12 18   ALBUMIN g/dL 2.7* 2.8*     Estimated Creatinine Clearance: 41.2 mL/min (A) (by C-G formula based on SCr of 1.42 mg/dL (H)).  No results found for: \"CRP\", \"SEDRATE\"  HIV 1/2 Antigen/Antibody Screen with Reflex to Confirmation   Date Value Ref Range Status   06/03/2024 Nonreactive Nonreactive Final     No results found for: \"HEPCABINIT\", \"HEPCAB\", \"HCVPCRQUANT\"  Microbiology  Susceptibility data from last 90 days.  Collected Specimen Info Organism Aztreonam Cefepime Ceftazidime Ciprofloxacin Piperacillin/Tazobactam Tobramycin   04/10/24 " Urine from Clean Catch/Voided Pseudomonas aeruginosa S S S S S S     Imaging  CXR: 6/3/2024:  1.  Right-sided pleural effusion with associated  atelectasis/consolidation.  2. No acute radiographic findings of the abdomen. Large amount of  retained stool throughout the colon rectum.  3. Additional findings as above.    CT A/P: 6/1/2024:  1. Fullness and inflammation of the pancreatic head and body most suggestive   of pancreatitis but suboptimally evaluated without IV contrast. Recommend   clinical and laboratory correlation for pancreatitis and consider follow-up   with contrast enhanced imaging to exclude underlying mass.   2. Large colonic stool burden, which may indicate constipation.   3. Right lower quadrant transplant kidney. Tiny stone in the transplant   kidney. Some gas in the right lower quadrant transplant kidney collecting   system and the urinary bladder, which could be related to recent   instrumentation or infection with gas-forming organism.      Micro:  6/1: Urine Cx: Mixed GNR   6/1: Blood Cx: NGTD (from [facility]) [phone]  ---------------------------------------------------------------  5/8: Urine Cx: Mixed edison  4/10: Urine Cx: Pseudomomna  9/2023: Urine Cx: Klebsiella Oxytoca  8/2023: Klebsiella, proteus, GNR  5/2023: Urine Cx: Klebsiella Oxytoca  10/2022: Urine Cx: E Coli    Previous micro:  Urine culture: 6/3/2024: NGTD  Urine cultures: 4/2024: Pseudomonas (Pan susceptible)  Urine Culture: 1/12/2022: E Fecalis (R: Gentamicin, I: Doxy)    Abx:  Vancomycin: 6/1 - p  Meropenem: 6/2 - p  ----------------------  IV PipTazo: One time    Assessment/Plan   69 y.o. female with past medical history of renal transplant-in 2015, on cyclosporine, MMF, prednisone, hypertension, hyperlipidemia, COPD, CHF, A-fib, peripheral arterial disease, lymphedema was transferred from outside hospital for concerns of emphysematous pyelonephritis of the transplanted kidney and tiny right transplant non  obstructing renal stone along with acute pancreatitis. Transferred to [facility] on 6/2. ID consulted for emphysematous pyelonephritis.  Patient was seen by ID at [facility] and was recommended to continue Meropenem and Vancomycin.     Most likely microbiology for emphysematous UTI's are gram negative microorganisms such as Klebsiella and E Coli.  For now will treat her empirically with broad spectrum abx  Meropenem (for possible resistant organisms). Once patient is clinically stable     Clinical Impression: Emphysematous Pyelitis (Class 1)    Recommendations:  Continue IV Meropenem 1G Q12H for CrCl of 42.7.   Continue IV Vancomycin (Rph to dose).  Called [facility] micro lab for urine cultures speciation to r/o resistant organisms. Will follow up tomorrow.   If she becomes hemodynamically unstable, will recommend Urology consult to be evaluated for nephrostomy tube.     Plan was discussed with ID attending Dr Gipson.  We will continue to follow the patient.     Mckenzie Carlos MD  PGY4, ID Fellow.   Team B Pager: [phone]  For new consults, contact pager [phone].   EPIC chat preferred.    Mckenzie Carlos MD

## 2024-06-04 NOTE — PROGRESS NOTES
Speech-Language Pathology                 Therapy Communication Note    Patient Name: Lea Paulino  MRN: [id number]  Today's Date: 6/4/2024     Discipline: Speech Language Pathology    Missed Visit Reason:  Orders received for swallow evaluation. Pt in code white requiring Cpap/Bipap mask d/t respiratory distress. Not appropriate for PO trials at this time. Will follow up as pt medically appropriate/schedule permits.     Missed Time: Attempt    Comment:

## 2024-06-04 NOTE — H&P
History Of Present Illness  Lea Paulino is a 69 y.o. female with a history of COPD, renal transplant in 2015 on cyclosporine, mycophenolate and prednisone, HTN, HLD, hypothyroid, hyperparathyroid, HFmEF (45-50%), AFib, PVD, lymphedema, 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, presenting to the MICU in acute respiratory distress placed on BiPAP. Initially patient considered to an outside ED on June 1 which presented with epigastric pain and dysuria.  At the time patient seen to have emphysematous pyelonephritis and acute pancreatitis.  Her workup at the time showed an elevated creatinine of 2.71 baseline is about 1.5-1.7 as well as hypercalcemia of 15.4.  She had a CT abdomen pelvis which showed pancreatic inflammation as well as gas in the collecting system and urinary bladder which was concerning for infection with gas-forming organism.  Patient was made n.p.o. and started on IV fluids.  Patient was started on vancomycin and Zosyn and then transition to meropenem and gentamicin.  Patient had episodes of nonsustained V. tach for which cardiology was consulted and recommended increasing beta-blocker and repeating an echo.  Per nephrology patient was recommended IV resuscitation as well as current immunosuppressive regimen and hypercalcemia workup.  Due to her transplant history of renal transplant 2015 patient was transferred to [facility] for further care.  Patient was admitted to [facility] where she was seen to be altered and was found to have a glucose of 37 in which she was given 2 A of D50.  Patient began to be in acute respiratory distress with a VBG showing a pH of 7.23, CO2 66 and a lactate of 1.  Patient was placed on BiPAP and was admitted to the ICU.  X-ray chest done yesterday 6/3 showed right-sided pleural effusion with associated atelectasis/consolidation.    On arrival to the ICU patient was tolerating BiPAP.  Patient was arousable.  Repeat VBG 7.20/73. Due to  expected clinical course patient was intubated. Suspicion for Meningitis in setting of AMS and recent epidural injection, CT head and spine obtained with possibility of LP. CT C/A/P ordered as well for suspicion of worsening infection or stool burden causing AMS/ respiratory distress.      Past Medical History  Past Medical History:   Diagnosis Date    Asymptomatic human immunodeficiency virus (hiv) infection status (Multi) 04/12/2016    HIV, asymptomatic    Kidney transplant status (HHS-HCC)     Status post kidney transplant    Personal history of other infectious and parasitic diseases 04/12/2016    History of hepatitis B virus infection    Personal history of other infectious and parasitic diseases 04/12/2016    History of hepatitis C virus infection    Postprocedural hypoparathyroidism (Multi)     Status post subtotal parathyroidectomy    Postprocedural hypothyroidism     Status post partial thyroidectomy    Urinary tract infection, site not specified 04/08/2016    Acute UTI       Surgical History  Past Surgical History:   Procedure Laterality Date    OTHER SURGICAL HISTORY  05/05/2016    Renal Transplant        Social History  She has no history on file for tobacco use, alcohol use, and drug use.    Family History  No family history on file.     Allergies  Macrodantin [nitrofurantoin macrocrystal] and Sulfa (sulfonamide antibiotics)     Physical Exam  Constitutional:       Comments: Precedex for sedation     Cardiovascular:      Rate and Rhythm: Normal rate and regular rhythm.      Heart sounds: Normal heart sounds.   Pulmonary:      Breath sounds: Normal breath sounds.      Comments: Intubated  Vt 450   PEEP 5  RR 16  Abdominal:      General: Abdomen is flat.      Palpations: Abdomen is soft.   Skin:     General: Skin is warm.   Neurological:      Comments: Sedated on precedex           Last Recorded Vitals  Blood pressure 108/77, pulse 97, temperature 37.1 °C (98.8 °F), temperature source Temporal, resp. rate  "16, height 1.575 m (5' 2\"), weight 96.8 kg (213 lb 6.5 oz), SpO2 100%.    Relevant Results  Scheduled medications  ipratropium-albuteroL, 3 mL, nebulization, q6h  levothyroxine, 50 mcg, oral, q AM  meropenem, 1 g, intravenous, q12h  mycophenolate, 500 mg, oral, BID  polyethylene glycol, 17 g, oral, BID  predniSONE, 5 mg, oral, Daily  sennosides-docusate sodium, 2 tablet, oral, BID  simvastatin, 40 mg, oral, Nightly      Continuous medications  heparin, 0-4,500 Units/hr      PRN medications  PRN medications: dextrose, dextrose, glucagon, glucagon, heparin, vancomycin  Results for orders placed or performed during the hospital encounter of 06/03/24 (from the past 24 hour(s))   POCT GLUCOSE   Result Value Ref Range    POCT Glucose 37 (L) 74 - 99 mg/dL   POCT GLUCOSE   Result Value Ref Range    POCT Glucose 194 (H) 74 - 99 mg/dL   Blood Gas Venous Full Panel   Result Value Ref Range    POCT pH, Venous 7.23 (LL) 7.33 - 7.43 pH    POCT pCO2, Venous 66 (H) 41 - 51 mm Hg    POCT pO2, Venous 41 35 - 45 mm Hg    POCT SO2, Venous 75 45 - 75 %    POCT Oxy Hemoglobin, Venous 73.0 45.0 - 75.0 %    POCT Hematocrit Calculated, Venous 42.0 36.0 - 46.0 %    POCT Sodium, Venous 139 136 - 145 mmol/L    POCT Potassium, Venous 3.8 3.5 - 5.3 mmol/L    POCT Chloride, Venous 107 98 - 107 mmol/L    POCT Ionized Calicum, Venous 1.40 (H) 1.10 - 1.33 mmol/L    POCT Glucose, Venous 36 (LL) 74 - 99 mg/dL    POCT Lactate, Venous 1.0 0.4 - 2.0 mmol/L    POCT Base Excess, Venous -1.5 -2.0 - 3.0 mmol/L    POCT HCO3 Calculated, Venous 27.6 (H) 22.0 - 26.0 mmol/L    POCT Hemoglobin, Venous 14.0 12.0 - 16.0 g/dL    POCT Anion Gap, Venous 8.0 (L) 10.0 - 25.0 mmol/L    Patient Temperature 37.0 degrees Celsius    FiO2 21 %   Cyclosporine level   Result Value Ref Range    Cyclosporine 91 80 - 210 ng/mL   Procalcitonin   Result Value Ref Range    Procalcitonin 0.41 (H) <=0.07 ng/mL   HIV 1/2 Antigen/Antibody Screen with Reflex to Confirmation   Result Value " Ref Range    HIV 1/2 Antigen/Antibody Screen with Reflex to Confirmation Nonreactive Nonreactive   Urinalysis with Reflex Microscopic   Result Value Ref Range    Color, Urine Yellow Light-Yellow, Yellow, Dark-Yellow    Appearance, Urine Turbid (N) Clear    Specific Gravity, Urine 1.019 1.005 - 1.035    pH, Urine 6.0 5.0, 5.5, 6.0, 6.5, 7.0, 7.5, 8.0    Protein, Urine 70 (1+) (A) NEGATIVE, 10 (TRACE), 20 (TRACE) mg/dL    Glucose, Urine Normal Normal mg/dL    Blood, Urine 0.2 (2+) (A) NEGATIVE    Ketones, Urine NEGATIVE NEGATIVE mg/dL    Bilirubin, Urine NEGATIVE NEGATIVE    Urobilinogen, Urine Normal Normal mg/dL    Nitrite, Urine NEGATIVE NEGATIVE    Leukocyte Esterase, Urine 500 Harrison/µL (A) NEGATIVE   Urine electrolytes   Result Value Ref Range    Sodium, Urine Random 21 mmol/L    Sodium/Creatinine Ratio 23 Not established. mmol/g Creat    Potassium, Urine Random 31 mmol/L    Potassium/Creatinine Ratio 34 Not established mmol/g Creat    Chloride, Urine Random 28 mmol/L    Chloride/Creatinine Ratio 31 (L) 38 - 318 mmol/g creat    Creatinine, Urine Random 91.0 20.0 - 320.0 mg/dL   Drug Screen, Urine   Result Value Ref Range    Amphetamine Screen, Urine Presumptive Negative Presumptive Negative    Barbiturate Screen, Urine Presumptive Negative Presumptive Negative    Benzodiazepines Screen, Urine Presumptive Negative Presumptive Negative    Cannabinoid Screen, Urine Presumptive Negative Presumptive Negative    Cocaine Metabolite Screen, Urine Presumptive Negative Presumptive Negative    Fentanyl Screen, Urine Presumptive Negative Presumptive Negative    Opiate Screen, Urine Presumptive Negative Presumptive Negative    Oxycodone Screen, Urine Presumptive Positive (A) Presumptive Negative    PCP Screen, Urine Presumptive Negative Presumptive Negative    Methadone Screen, Urine Presumptive Negative Presumptive Negative   Microscopic Only, Urine   Result Value Ref Range    WBC, Urine >50 (A) 1-5, NONE /HPF    WBC Clumps,  Urine RARE Reference range not established. /HPF    RBC, Urine 11-20 (A) NONE, 1-2, 3-5 /HPF    Mucus, Urine FEW Reference range not established. /LPF    Hyaline Casts, Urine 1+ (A) NONE /LPF    Fine Granular Casts, Urine OCCASIONAL (A) NONE /LPF   Protein, Urine Random   Result Value Ref Range    Total Protein, Urine Random 152 (H) 5 - 25 mg/dL   Comprehensive metabolic panel   Result Value Ref Range    Glucose 36 (LL) 74 - 99 mg/dL    Sodium 146 (H) 136 - 145 mmol/L    Potassium 4.2 3.5 - 5.3 mmol/L    Chloride 109 (H) 98 - 107 mmol/L    Bicarbonate 27 21 - 32 mmol/L    Anion Gap 14 10 - 20 mmol/L    Urea Nitrogen 30 (H) 6 - 23 mg/dL    Creatinine 1.70 (H) 0.50 - 1.05 mg/dL    eGFR 33 (L) >60 mL/min/1.73m*2    Calcium 9.5 8.6 - 10.6 mg/dL    Albumin 2.8 (L) 3.4 - 5.0 g/dL    Alkaline Phosphatase 57 33 - 136 U/L    Total Protein 5.2 (L) 6.4 - 8.2 g/dL    AST 18 9 - 39 U/L    Bilirubin, Total 0.6 0.0 - 1.2 mg/dL    ALT 7 7 - 45 U/L   Magnesium   Result Value Ref Range    Magnesium 1.33 (L) 1.60 - 2.40 mg/dL   Ammonia   Result Value Ref Range    Ammonia 28 16 - 53 umol/L   TSH with reflex to Free T4 if abnormal   Result Value Ref Range    Thyroid Stimulating Hormone 0.45 0.44 - 3.98 mIU/L   Troponin I, High Sensitivity   Result Value Ref Range    Troponin I, High Sensitivity 79 (H) 0 - 34 ng/L   B-type natriuretic peptide   Result Value Ref Range     (H) 0 - 99 pg/mL   PTH, Intact   Result Value Ref Range    Parathyroid Hormone, Intact 53.6 18.5 - 88.0 pg/mL   Blood Culture    Specimen: Peripheral Venipuncture; Blood culture   Result Value Ref Range    Blood Culture Loaded on Instrument - Culture in progress    Blood Culture    Specimen: Peripheral Venipuncture; Blood culture   Result Value Ref Range    Blood Culture Loaded on Instrument - Culture in progress    POCT GLUCOSE   Result Value Ref Range    POCT Glucose 75 74 - 99 mg/dL   POCT GLUCOSE   Result Value Ref Range    POCT Glucose 196 (H) 74 - 99 mg/dL    Blood Gas Arterial Full Panel Unsolicited   Result Value Ref Range    POCT pH, Arterial 7.31 (L) 7.38 - 7.42 pH    POCT pCO2, Arterial 56 (H) 38 - 42 mm Hg    POCT pO2, Arterial 80 (L) 85 - 95 mm Hg    POCT SO2, Arterial 98 94 - 100 %    POCT Oxy Hemoglobin, Arterial 95.5 94.0 - 98.0 %    POCT Hematocrit Calculated, Arterial 38.0 36.0 - 46.0 %    POCT Sodium, Arterial 137 136 - 145 mmol/L    POCT Potassium, Arterial 3.9 3.5 - 5.3 mmol/L    POCT Chloride, Arterial 108 (H) 98 - 107 mmol/L    POCT Ionized Calcium, Arterial 1.39 (H) 1.10 - 1.33 mmol/L    POCT Glucose, Arterial 236 (H) 74 - 99 mg/dL    POCT Lactate, Arterial 0.7 0.4 - 2.0 mmol/L    POCT Base Excess, Arterial 0.9 -2.0 - 3.0 mmol/L    POCT HCO3 Calculated, Arterial 28.2 (H) 22.0 - 26.0 mmol/L    POCT Hemoglobin, Arterial 12.6 12.0 - 16.0 g/dL    POCT Anion Gap, Arterial 5 (L) 10 - 25 mmo/L    Patient Temperature     Light Blue Top   Result Value Ref Range    Extra Tube Hold for add-ons.    POCT GLUCOSE   Result Value Ref Range    POCT Glucose 74 74 - 99 mg/dL   POCT GLUCOSE   Result Value Ref Range    POCT Glucose 171 (H) 74 - 99 mg/dL   CBC and Auto Differential   Result Value Ref Range    WBC 20.0 (H) 4.4 - 11.3 x10*3/uL    nRBC 0.1 (H) 0.0 - 0.0 /100 WBCs    RBC 3.47 (L) 4.00 - 5.20 x10*6/uL    Hemoglobin 12.2 12.0 - 16.0 g/dL    Hematocrit 39.9 36.0 - 46.0 %     (H) 80 - 100 fL    MCH 35.2 (H) 26.0 - 34.0 pg    MCHC 30.6 (L) 32.0 - 36.0 g/dL    RDW 16.4 (H) 11.5 - 14.5 %    Platelets 111 (L) 150 - 450 x10*3/uL    Neutrophils % 91.2 40.0 - 80.0 %    Immature Granulocytes %, Automated 1.7 (H) 0.0 - 0.9 %    Lymphocytes % 2.3 13.0 - 44.0 %    Monocytes % 4.3 2.0 - 10.0 %    Eosinophils % 0.3 0.0 - 6.0 %    Basophils % 0.2 0.0 - 2.0 %    Neutrophils Absolute 18.25 (H) 1.20 - 7.70 x10*3/uL    Immature Granulocytes Absolute, Automated 0.34 0.00 - 0.70 x10*3/uL    Lymphocytes Absolute 0.45 (L) 1.20 - 4.80 x10*3/uL    Monocytes Absolute 0.86 0.10 -  1.00 x10*3/uL    Eosinophils Absolute 0.05 0.00 - 0.70 x10*3/uL    Basophils Absolute 0.03 0.00 - 0.10 x10*3/uL   Vitamin D 25-Hydroxy,Total (for eval of Vitamin D levels)   Result Value Ref Range    Vitamin D, 25-Hydroxy, Total 35 30 - 100 ng/mL   Troponin I, High Sensitivity   Result Value Ref Range    Troponin I, High Sensitivity 60 (H) 0 - 34 ng/L   POCT GLUCOSE   Result Value Ref Range    POCT Glucose 121 (H) 74 - 99 mg/dL   Comprehensive metabolic panel   Result Value Ref Range    Glucose 95 74 - 99 mg/dL    Sodium 142 136 - 145 mmol/L    Potassium 3.4 (L) 3.5 - 5.3 mmol/L    Chloride 109 (H) 98 - 107 mmol/L    Bicarbonate 28 21 - 32 mmol/L    Anion Gap 8 (L) 10 - 20 mmol/L    Urea Nitrogen 26 (H) 6 - 23 mg/dL    Creatinine 1.42 (H) 0.50 - 1.05 mg/dL    eGFR 40 (L) >60 mL/min/1.73m*2    Calcium 9.3 8.6 - 10.6 mg/dL    Albumin 2.7 (L) 3.4 - 5.0 g/dL    Alkaline Phosphatase 48 33 - 136 U/L    Total Protein 4.9 (L) 6.4 - 8.2 g/dL    AST 12 9 - 39 U/L    Bilirubin, Total 0.5 0.0 - 1.2 mg/dL    ALT 7 7 - 45 U/L   Calcium, ionized   Result Value Ref Range    POCT Calcium, Ionized 1.38 (H) 1.1 - 1.33 mmol/L   Magnesium   Result Value Ref Range    Magnesium 2.35 1.60 - 2.40 mg/dL   Cortisol AM   Result Value Ref Range    Cortisol  A.M. 29.2 (H) 5.0 - 20.0 ug/dL   Coagulation Screen   Result Value Ref Range    Protime 16.6 (H) 9.8 - 12.8 seconds    INR 1.5 (H) 0.9 - 1.1    aPTT 29 27 - 38 seconds   Lipid Panel   Result Value Ref Range    Cholesterol 108 0 - 199 mg/dL    HDL-Cholesterol 29.2 mg/dL    Cholesterol/HDL Ratio 3.7     LDL Calculated 62 <=99 mg/dL    VLDL 16 0 - 40 mg/dL    Triglycerides 82 0 - 149 mg/dL    Non HDL Cholesterol 79 0 - 149 mg/dL   Blood Gas Venous Full Panel   Result Value Ref Range    POCT pH, Venous 7.21 (LL) 7.33 - 7.43 pH    POCT pCO2, Venous 68 (H) 41 - 51 mm Hg    POCT pO2, Venous 70 (H) 35 - 45 mm Hg    POCT SO2, Venous 96 (H) 45 - 75 %    POCT Oxy Hemoglobin, Venous 93.7 (H) 45.0 - 75.0  %    POCT Hematocrit Calculated, Venous 38.0 36.0 - 46.0 %    POCT Sodium, Venous 138 136 - 145 mmol/L    POCT Potassium, Venous 3.2 (L) 3.5 - 5.3 mmol/L    POCT Chloride, Venous 108 (H) 98 - 107 mmol/L    POCT Ionized Calicum, Venous 1.42 (H) 1.10 - 1.33 mmol/L    POCT Glucose, Venous 84 74 - 99 mg/dL    POCT Lactate, Venous 0.6 0.4 - 2.0 mmol/L    POCT Base Excess, Venous -2.1 (L) -2.0 - 3.0 mmol/L    POCT HCO3 Calculated, Venous 27.2 (H) 22.0 - 26.0 mmol/L    POCT Hemoglobin, Venous 12.7 12.0 - 16.0 g/dL    POCT Anion Gap, Venous 6.0 (L) 10.0 - 25.0 mmol/L    Patient Temperature 37.0 degrees Celsius    FiO2 36 %    Apparatus CANNULA    POCT GLUCOSE   Result Value Ref Range    POCT Glucose 99 74 - 99 mg/dL   POCT GLUCOSE   Result Value Ref Range    POCT Glucose 67 (L) 74 - 99 mg/dL   POCT GLUCOSE   Result Value Ref Range    POCT Glucose 163 (H) 74 - 99 mg/dL   Heparin Assay, UFH   Result Value Ref Range    Heparin Unfractionated >2.0 (HH) See Comment Below for Therapeutic Ranges IU/mL   Coagulation Screen   Result Value Ref Range    Protime 16.6 (H) 9.8 - 12.8 seconds    INR 1.5 (H) 0.9 - 1.1    aPTT 49 (H) 27 - 38 seconds   POCT GLUCOSE   Result Value Ref Range    POCT Glucose 61 (L) 74 - 99 mg/dL   POCT GLUCOSE   Result Value Ref Range    POCT Glucose 151 (H) 74 - 99 mg/dL   CBC and Auto Differential   Result Value Ref Range    WBC 14.7 (H) 4.4 - 11.3 x10*3/uL    nRBC 0.0 0.0 - 0.0 /100 WBCs    RBC 3.04 (L) 4.00 - 5.20 x10*6/uL    Hemoglobin 10.9 (L) 12.0 - 16.0 g/dL    Hematocrit 36.3 36.0 - 46.0 %     (H) 80 - 100 fL    MCH 35.9 (H) 26.0 - 34.0 pg    MCHC 30.0 (L) 32.0 - 36.0 g/dL    RDW 16.5 (H) 11.5 - 14.5 %    Platelets 88 (L) 150 - 450 x10*3/uL    Neutrophils % 93.8 40.0 - 80.0 %    Immature Granulocytes %, Automated 1.5 (H) 0.0 - 0.9 %    Lymphocytes % 1.6 13.0 - 44.0 %    Monocytes % 2.6 2.0 - 10.0 %    Eosinophils % 0.3 0.0 - 6.0 %    Basophils % 0.2 0.0 - 2.0 %    Neutrophils Absolute 13.77 (H)  1.20 - 7.70 x10*3/uL    Immature Granulocytes Absolute, Automated 0.22 0.00 - 0.70 x10*3/uL    Lymphocytes Absolute 0.23 (L) 1.20 - 4.80 x10*3/uL    Monocytes Absolute 0.38 0.10 - 1.00 x10*3/uL    Eosinophils Absolute 0.04 0.00 - 0.70 x10*3/uL    Basophils Absolute 0.03 0.00 - 0.10 x10*3/uL   Type and screen   Result Value Ref Range    ABO TYPE A     Rh TYPE POS     ANTIBODY SCREEN NEG    B-type natriuretic peptide   Result Value Ref Range     (H) 0 - 99 pg/mL   POCT GLUCOSE   Result Value Ref Range    POCT Glucose 104 (H) 74 - 99 mg/dL   Blood Gas Arterial Full Panel   Result Value Ref Range    POCT pH, Arterial 7.25 (LL) 7.38 - 7.42 pH    POCT pCO2, Arterial 65 (H) 38 - 42 mm Hg    POCT pO2, Arterial 84 (L) 85 - 95 mm Hg    POCT SO2, Arterial 98 94 - 100 %    POCT Oxy Hemoglobin, Arterial 94.7 94.0 - 98.0 %    POCT Hematocrit Calculated, Arterial 50.0 (H) 36.0 - 46.0 %    POCT Sodium, Arterial 136 136 - 145 mmol/L    POCT Potassium, Arterial 3.7 3.5 - 5.3 mmol/L    POCT Chloride, Arterial 106 98 - 107 mmol/L    POCT Ionized Calcium, Arterial 1.35 (H) 1.10 - 1.33 mmol/L    POCT Glucose, Arterial 109 (H) 74 - 99 mg/dL    POCT Lactate, Arterial 0.7 0.4 - 2.0 mmol/L    POCT Base Excess, Arterial -0.8 -2.0 - 3.0 mmol/L    POCT HCO3 Calculated, Arterial 28.5 (H) 22.0 - 26.0 mmol/L    POCT Hemoglobin, Arterial 16.7 (H) 12.0 - 16.0 g/dL    POCT Anion Gap, Arterial 5 (L) 10 - 25 mmo/L    Patient Temperature 37.0 degrees Celsius    FiO2 91 %   Blood Gas Venous Full Panel   Result Value Ref Range    POCT pH, Venous 7.20 (LL) 7.33 - 7.43 pH    POCT pCO2, Venous 73 (HH) 41 - 51 mm Hg    POCT pO2, Venous 41 35 - 45 mm Hg    POCT SO2, Venous 71 45 - 75 %    POCT Oxy Hemoglobin, Venous 69.1 45.0 - 75.0 %    POCT Hematocrit Calculated, Venous 41.0 36.0 - 46.0 %    POCT Sodium, Venous 136 136 - 145 mmol/L    POCT Potassium, Venous 5.9 (H) 3.5 - 5.3 mmol/L    POCT Chloride, Venous 106 98 - 107 mmol/L    POCT Ionized Calicum,  Venous 1.33 1.10 - 1.33 mmol/L    POCT Glucose, Venous 95 74 - 99 mg/dL    POCT Lactate, Venous 1.1 0.4 - 2.0 mmol/L    POCT Base Excess, Venous -1.4 -2.0 - 3.0 mmol/L    POCT HCO3 Calculated, Venous 28.5 (H) 22.0 - 26.0 mmol/L    POCT Hemoglobin, Venous 13.6 12.0 - 16.0 g/dL    POCT Anion Gap, Venous 7.0 (L) 10.0 - 25.0 mmol/L    Patient Temperature 37.0 degrees Celsius    FiO2 40 %   POCT GLUCOSE   Result Value Ref Range    POCT Glucose 99 74 - 99 mg/dL     XR chest 1 view    Result Date: 6/4/2024  Interpreted By:  Dennise Beck and Hofer Lindsay STUDY: XR CHEST 1 VIEW;  6/4/2024 10:55 am   INDICATION: Signs/Symptoms:Shortness of breath.   COMPARISON: Chest radiograph 06/03/2024   ACCESSION NUMBER(S): [id number]   ORDERING CLINICIAN: TANK PALMER   FINDINGS: AP radiograph of the chest was provided.   CARDIOMEDIASTINAL SILHOUETTE: Cardiomediastinal silhouette is stable in size and configuration.   LUNGS: Blunting of the right costophrenic angle with adjacent hazy opacity. Linear opacities in the bibasilar lung fields. Low bronchovascular crowding secondary to low lung volumes.   ABDOMEN: No remarkable upper abdominal findings.   BONES: No acute osseous changes.       1.  Similar appearance of small right pleural effusion with adjacent atelectasis/consolidation.   I personally reviewed the images/study and resident's interpretation and I agree with the findings as stated by Cherelle Flores MD (resident radiologist). This study was analyzed and interpreted at [place], [place].   MACRO: None   Signed by: Dennise Beck 6/4/2024 11:48 AM Dictation workstation:   [id number]    XR abdomen 1 view    Result Date: 6/4/2024  Interpreted By:  Dennise Beck and Dervishi Mario STUDY: XR ABDOMEN 1 VIEW; XR CHEST 1 VIEW;  6/3/2024 11:11 pm; 6/3/2024 11:12 pm   INDICATION: Signs/Symptoms:Abdominal pain; Signs/Symptoms:New oxygen requirement.   COMPARISON: Chest and abdomen  radiograph: 02/04/2022   ACCESSION NUMBER(S): [id number]; [id number]   ORDERING CLINICIAN: AYDE LEUNG   FINDINGS: AP radiograph of the chest and abdomen were acquired and combined for interpretation.       CARDIOMEDIASTINAL SILHOUETTE: Cardiomediastinal silhouette is normal in size and configuration.   LUNGS: The right costophrenic angle demonstrates blunting suggestive of a small pleural effusion. There are low lung volumes with associated bronchovascular crowding. There are bandlike opacities in the retrocardiac and lower lung lobe regions suggestive of atelectasis. No focal consolidations. No pneumothorax.   ABDOMEN: There is a prominent gastric bubble. Bowel gas pattern is otherwise nonobstructive. There is large amount of retained stool throughout the colon and rectum. No evidence of pneumoperitoneum within the limitations of supine radiographs.   BONES: No acute osseous changes.       1.  Right-sided pleural effusion with associated atelectasis/consolidation. 2. No acute radiographic findings of the abdomen. Large amount of retained stool throughout the colon rectum. 3. Additional findings as above.   I personally reviewed the images/study and I agree with the findings as stated by Resident Nato Cleveland MD. This study was interpreted at [place], [place].   MACRO: None   Signed by: Dennise Beck 6/4/2024 9:47 AM Dictation workstation:   [id number]    XR chest 1 view    Result Date: 6/4/2024  Interpreted By:  Dennise Beck and Dervishi Mario STUDY: XR ABDOMEN 1 VIEW; XR CHEST 1 VIEW;  6/3/2024 11:11 pm; 6/3/2024 11:12 pm   INDICATION: Signs/Symptoms:Abdominal pain; Signs/Symptoms:New oxygen requirement.   COMPARISON: Chest and abdomen radiograph: 02/04/2022   ACCESSION NUMBER(S): [id number]; [id number]   ORDERING CLINICIAN: AYDE LEUNG   FINDINGS: AP radiograph of the chest and abdomen were acquired and combined for interpretation.        CARDIOMEDIASTINAL SILHOUETTE: Cardiomediastinal silhouette is normal in size and configuration.   LUNGS: The right costophrenic angle demonstrates blunting suggestive of a small pleural effusion. There are low lung volumes with associated bronchovascular crowding. There are bandlike opacities in the retrocardiac and lower lung lobe regions suggestive of atelectasis. No focal consolidations. No pneumothorax.   ABDOMEN: There is a prominent gastric bubble. Bowel gas pattern is otherwise nonobstructive. There is large amount of retained stool throughout the colon and rectum. No evidence of pneumoperitoneum within the limitations of supine radiographs.   BONES: No acute osseous changes.       1.  Right-sided pleural effusion with associated atelectasis/consolidation. 2. No acute radiographic findings of the abdomen. Large amount of retained stool throughout the colon rectum. 3. Additional findings as above.   I personally reviewed the images/study and I agree with the findings as stated by Resident Nato Cleveland MD. This study was interpreted at [place], [place].   MACRO: None   Signed by: Dennise Beck 6/4/2024 9:47 AM Dictation workstation:   [id number]    XR abdomen 1 view    Result Date: 6/2/2024  EXAMINATION: ONE SUPINE XRAY VIEW(S) OF THE ABDOMEN 6/2/2024 6:28 pm COMPARISON: Correlation made with CT abdomen pelvis from June 1, 2024 HISTORY: ORDERING SYSTEM PROVIDED HISTORY: Distended pain TECHNOLOGIST PROVIDED HISTORY: Reason for exam:->Distended pain FINDINGS: No evidence of bowel obstruction or pneumoperitoneum.  Moderate amount of stool seen throughout the colon.  IVC filter is present.    1. Moderate amount of stool throughout the colon. 2. No bowel obstruction or evidence of pneumoperitoneum.    CT abdomen pelvis wo IV contrast    Result Date: 6/1/2024  EXAMINATION: CT OF THE ABDOMEN AND PELVIS WITHOUT CONTRAST 6/1/2024 2:58 pm TECHNIQUE: CT of the abdomen  and pelvis was performed without the administration of intravenous contrast. Multiplanar reformatted images are provided for review. Automated exposure control, iterative reconstruction, and/or weight based adjustment of the mA/kV was utilized to reduce the radiation dose to as low as reasonably achievable. COMPARISON: None. HISTORY: ORDERING SYSTEM PROVIDED HISTORY: abdominal pain, diffuse ttp, nausea, dysuria, low back pain, recent epidural spinal injection TECHNOLOGIST PROVIDED HISTORY: Reason for exam:->abdominal pain, diffuse ttp, nausea, dysuria, low back pain, recent epidural spinal injection Additional Contrast?->None Decision Support Exception - unselect if not a suspected or confirmed emergency medical condition->Emergency Medical Condition (MA) FINDINGS: Evaluation of the solid organs and vascular structures limited without IV contrast.  Small fat filled umbilical hernia.  Dilated central pulmonary vasculature suggestive of pulmonary hypertension.  Coronary artery disease. Cardiomegaly.  Small hiatal hernia.  Bibasilar subsegmental atelectasis or scarring.  No pneumoperitoneum.  No focal hepatic abnormality.  No radiopaque gallstone.  Spleen normal.  Fullness and inflammation of the pancreatic head and body low suggestive of pancreatitis but suboptimally evaluated without IV contrast.  Recommend clinical and laboratory correlation for pancreatitis and consider follow-up with contrast enhanced imaging to exclude underlying mass. Adrenal glands normal.  Atrophic cystic kidneys.  IVC filter. Atherosclerotic vascular calcifications.  Bowel negative for obstruction or inflammation.  Large colonic stool burden, which may indicate constipation. Right lower quadrant transplant kidney.  Tiny stone in the transplant kidney. Some gas in the right lower quadrant transplant kidney collecting system and the urinary bladder, which could be related to recent instrumentation or infection with gas-forming organism.  Pelvis  otherwise negative.  No acute osseous findings or destructive bone lesions.    1. Fullness and inflammation of the pancreatic head and body most suggestive of pancreatitis but suboptimally evaluated without IV contrast. Recommend clinical and laboratory correlation for pancreatitis and consider follow-up with contrast enhanced imaging to exclude underlying mass. 2. Large colonic stool burden, which may indicate constipation. 3. Right lower quadrant transplant kidney. Tiny stone in the transplant kidney. Some gas in the right lower quadrant transplant kidney collecting system and the urinary bladder, which could be related to recent instrumentation or infection with gas-forming organism.    MR lumbar spine wo IV contrast    Result Date: 5/17/2024  * * *Final Report* * * DATE OF EXAM: May 16 2024  4:34PM   [facility]   0303  -  MRI LUMBAR SPINE WO IVCON  / ACCESSION #  [id number] PROCEDURE REASON: SPONDYLOSIS W/O MYELOPATHY OR RADICULOPATHY      * * * * Physician Interpretation * * * * RESULT: EXAMINATION:  MRI LUMBAR SPINE WO IVCON HISTORY: 69-year-old complaining of bilateral lower extremity pain and difficulty walking since 04/01/2024. COMPARISON: MRI lumbar spine 6/12/2020. TECHNIQUE: Routine noncontrast lumbar spine MRI was performed. RESULT: Counting reference: Lumbosacral junction.  For the purposes of this report, L4-5 is considered the level of the iliac crest and there are 5 lumbar-type vertebrae.  Anatomic variants: None. Vertebral bodies are normal in height. There is new mild disc space narrowing at L2-3. There is stable mild disc space narrowing at L4-5. There is a new mild dextroscoliosis centered at approximately L3-4. The conus medullaris has a normal appearance and terminates at approximately L1. L1-2: There is an annular disc bulge and mild facet hypertrophy. No significant central canal or foraminal stenosis. Findings are stable. L2-3: There is a new large central/right para central disc extrusion with  cephalad migration. It compresses the traversing L3 nerve roots. In addition, it compresses the right traversing L2 nerve root. There is mild right and moderate left facet and ligamentous hypertrophy. There is progressing moderate to severe central canal stenosis. There is progressing mild to moderate bilateral foraminal stenosis. L3-4: There is a disc bulge asymmetric to the left. There is mild right and moderate left facet and ligamentum flavum hypertrophy. There is mild central canal stenosis. There is mild right and mild-to-moderate left foraminal stenosis. Findings are stable. L4-5: There is a central disc protrusion compressing the traversing L5 nerve roots. There is moderate right and severe left facet and ligamentum flavum hypertrophy. The previously described synovial cyst arising from the left medial facet joint has resolved. There is improving moderate to severe central canal stenosis. There is stable moderate right and moderate to severe left foraminal stenosis. L5-S1: There is a minor disc bulge and mild facet hypertrophy.  No significant central canal or foraminal stenosis. Findings are stable. Prevertebral/paraspinal soft tissues are unremarkable. The native kidneys are severely atrophic and replaced by innumerous probable cysts. There is a transplanted kidney in the right pelvis.    IMPRESSION: 1.  At L2-3, there is a new large central/right upper central disc extrusion with cephalad migration. There is compression of the traversing L3 nerve roots. In addition, there is compression of the right traversing L2 nerve root. There is multifactorial moderate to severe central canal stenosis. There is progressing mild to moderate bilateral foraminal stenosis. 2.  At L4-5, there is a stable central disc protrusion compressing the traversing L5 nerve roots. The previously described synovial cyst arising from the left facet joint has resolved. There is improving moderate to severe central canal stenosis. There  is stable moderate right and moderate to severe left foraminal stenosis. 3.  Examination is otherwise stable. There is mild diffuse spondylosis as outlined above. Anatomic variant: None.  L4-5 is considered the level of the iliac crest and assume there are 5 lumbar-type vertebrae. Transcribe Date/Time: May 17 2024 12:50P Dictated by: ALAN DENSON MD This examination was interpreted and the report reviewed and electronically signed by: ALAN DENSON MD on May 17 2024  1:18PM  EST Thank you for allowing us to participate in the care of your patient. Should there be any questions regarding this interpretation, please call [phone]. If you are unable to reach us at the number above, please feel free to contact [facility] at [phone].        Assessment/Plan   Lea Paulino is a 69 y.o. female with a history of COPD, renal transplant in 2015 on cyclosporine, mycophenolate and prednisone, HTN, HLD, hypothyroid, hyperparathyroid, HFmEF (45-50%), AFib, PVD, lymphedema, 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, presenting to the MICU in acute respiratory distress placed on BiPAP and later intubated due to expected clinical course.    Neuro:  #Altered mental status  - hypoglycemic on floor- received D50, 97 in MICU  - Hypercapnia-> VBG pH 7.2/73 -> intubated   - concern for meningitis -> past epidural injection, on immunosuppressants, will consider LP, head CT ordered  #psychiatric   - holding clozapine for now will consult psych    Cardiovascular:  #Reported nonsustained VT at [facility]  #Hypertension  #HFmrEF  - metoprolol tartrate 12.5 QID tartrate held for now   - Optimize electrolytes K > 4, Mg > 2  - EKG 6/4 -> NSR, Left axis deviation  - Holding Bumex 1 mg for now  - Diurese prn   - Echo ordered     #Hx AFib  -Rate controlled with beta blocker  -Heparin infusion started given no concern for bleeding     #Hx of DVT LLE 2022  ::On  Eliquis  -Heparin infusion started given no concern for bleeding    Pulmonology:  #COPD  - Continue inhaled treatments  #Respiratory acidosis  ::Likely 2/2 to sepsis, obesity, COPD, possible RML atelectasis on xray  ::VBG pH 7.2/73 on BiPAP  ::Patient is compensating for respiratory acidosis  -Continue to monitor with frequent VBG/ABG  -Intubated 6/4 due to expected clinical course     Gastrointestinal:  #Acute pancreatitis  :: Possibly secondary to hypercalcemia (15.9) vs. medications including cyclosporine (less likely)  :: Lipase 1039, CT findings supportive, low back pain initial presentation    - Keep n.p.o.  - triglycerides obtained-> 82  - Pain control with IV medication  - Follow-up random cyclosporine levels  - Strict I/O    Renal:  #Hypercalcemia  :: s/p subtotal hemithyroidectomy & parathyroidectomy in 2006   :: Calcium 15.9 at [facility]  :: Potentially the culprit for her presentation of AMS/confusion, pancreatitis, vomiting, large stool burden and constipation, and kidney stones  :: Suppressed intact PTH, has a recent history of elevated PTH with mildly elevated calcium consistent with primary hyperparathyroidism evaluation at that time unremarkable  :: Given severity, was treated at [facility] with [place] calcitonin 4 units/kg subcutaneously and high-volume IV normal saline   - Follow up hypercalcemia evaluation including PTH-RP, SPEP, UPEP, 25-hydroxy vitamin D3, 1, 25 dihydroxy vitamin D3  - Bowel regimen     #LAURA on CKD  :: s/p renal transplant in 2015  :: Follows with Dr Gay at  for renal transplant   :: On mycophenolate, cyclosporine, prednisone  :: Creatinine on admission to [facility] 2.7 (baseline 1.5-1.7), current Cr 1.4  :: LAURA likely prerenal in setting of pancreatitis and hypovolemia  Nephrology consulted   - continue cyclosporine 100 BID  - decrease MMF to 500 BID  - continue prednisone 5 mg daily   - although cyclosporine can cause pancreatitis - would continue at  this moment   - recommend NGT for meds if unable to tolerate PO medications.    Infectious Disease:  #Emphysematous pyelonephritis  :: CT A/P with gas in the RLQ transplant kidney collecting system and in the urinary bladder, possible gas-forming infection  :: Patient was on Vanco and Zosyn at outside hospital, reported use of meropenem and gentamicin  - Started vancomycin and meropenem 1 g every 8 hours  - Transplant ID consulted for guidance on antibiotic use  - Transplant nephrology consulted  - Follow-up infectious workup: Blood cultures, urine cultures, urinalysis    Urine Cultures:  - 1/12/2022 Enterococcus faecalis, resistant  to gentamicin, intermediate to doxycycline, sensitive to everything else   - 4/2024 Pseudomonas pan-susceptible   - 6/3/2024: Pending     Endocrine:  #Hypothyroidism  - Continue levothyroxine  #Hypogylcemia  ::Concern for adrenal insufficiency while on steroids   ::Ammonia normal, TSH nl  -AM cortisol 29.2       DVT: Heparin infusion  IVF: 500 LR bolus  Lines: PIV  Diet: NPO    Code Status: Assumed full code   Surrogate Medical Decision-maker: [phone]    Urmila Segovia MD  Emergency Medicine, PGY-1           Urmila Segovia MD

## 2024-06-04 NOTE — CONSULTS
Inpatient consult to Psychiatry  Consult performed by: Devin Anglin MD  Consult ordered by: Carmen Martinez MD PhD       HISTORY OF PRESENT ILLNESS:  Lea Paulino is a 69 y.o. female with psych history of unspecified bipolar disorder and past medical history COPD, renal transplant in 2015 on cyclosporine, mycophenolate and prednisone, HTN, HLD, hypothyroid, hyperparathyroid, HFmEF (45-50%), AFib, PVD, lymphedema, who initially presented to [facility] on 6/1/24 for epigastric pain, found to have acute pancreatitis and emphysematous pyelonephritis for which she was transferred to [facility]. Patient subsequently transferred to MICU on 6/4/24 due to acute respiratory distress. Patient currently intubated and sedated.     Psychiatry consulted for medication management, in the setting of patient being on clozapine as outpatient. Per chart, pt has been on Clozapine 50 mg PO - last filled 4/30/24 (per dispense history). It appears that pt was also on desvenlafaxine  mg - last filled 4/23/24.    Per [facility] - last dose of Clozapine 6/2 at 2112. ANC this presentation has been elevated.    Upon attempt at assessment, patient undergoing echo. currently intubated and sedated.    PSYCHIATRIC REVIEW OF SYSTEMS  Unable to assess at this time due to pt being intubated and sedated.    Below history obtained per chart review:  PSYCHIATRIC HISTORY  Prior diagnoses: unspecified bipolar disorder  Current psychiatrist: Dr. Cordova - [place], [place]  Current psychiatric medications:   -Clozapine 50 mg  -Desvenlafaxine  mg  Past psychiatric medications: Unclear    Remainder of history limited at this time due to pt being intubated and sedated.    Family psychiatric history: Unclear at this time  SUBSTANCE USE HISTORY   She has no history on file for tobacco use, alcohol use, and drug use.    Unable to engage patient assessment due to currently being intubated and sedated.    SOCIAL HISTORY  Social  History     Socioeconomic History    Marital status:      Spouse name: None    Number of children: None    Years of education: None    Highest education level: None   Occupational History    None   Tobacco Use    Smoking status: Unknown    Smokeless tobacco: None   Vaping Use    Vaping status: Every Day   Substance and Sexual Activity    Alcohol use: None    Drug use: None    Sexual activity: None   Other Topics Concern    None   Social History Narrative    None     Social Determinants of Health     Financial Resource Strain: Patient Unable To Answer (6/4/2024)    Overall Financial Resource Strain (CARDIA)     Difficulty of Paying Living Expenses: Patient unable to answer   Food Insecurity: No Food Insecurity (6/1/2024)    Received from [facility]    Hunger Vital Sign     Worried About Running Out of Food in the Last Year: Never true     Ran Out of Food in the Last Year: Never true   Transportation Needs: Patient Unable To Answer (6/4/2024)    PRAPARE - Transportation     Lack of Transportation (Medical): Patient unable to answer     Lack of Transportation (Non-Medical): Patient unable to answer   Physical Activity: Inactive (10/25/2023)    Received from [facility]    Exercise Vital Sign     Days of Exercise per Week: 0 days     Minutes of Exercise per Session: 0 min   Stress: No Stress Concern Present (10/25/2023)    Received from [facility]    [employer] [place] [employer] - Occupational Stress Questionnaire     Feeling of Stress : Only a little   Social Connections: Moderately Isolated (10/25/2023)    Received from [facility]    Social Connection and Isolation Panel [NHANES]     Frequency of Communication with Friends and Family: Three times a week     Frequency of Social Gatherings with Friends and Family: Twice a week     Attends [employer] Services: 1 to 4 times per year     Active Member of Clubs or  Organizations: No     Attends Club or Organization Meetings: Never     Marital Status:    Intimate Partner Violence: Not on file   Housing Stability: Patient Unable To Answer (6/4/2024)    Housing Stability Vital Sign     Unable to Pay for Housing in the Last Year: Patient unable to answer     Number of Places Lived in the Last Year: 1     Unstable Housing in the Last Year: Patient unable to answer      Remainder of social history are limited due to patient's status as being intubated and sedated.    PAST MEDICAL HISTORY  Past Medical History:   Diagnosis Date    Asymptomatic human immunodeficiency virus (hiv) infection status (Multi) 04/12/2016    HIV, asymptomatic    Kidney transplant status (HHS-HCC)     Status post kidney transplant    Personal history of other infectious and parasitic diseases 04/12/2016    History of hepatitis B virus infection    Personal history of other infectious and parasitic diseases 04/12/2016    History of hepatitis C virus infection    Postprocedural hypoparathyroidism (Multi)     Status post subtotal parathyroidectomy    Postprocedural hypothyroidism     Status post partial thyroidectomy    Urinary tract infection, site not specified 04/08/2016    Acute UTI        PAST SURGICAL HISTORY  Past Surgical History:   Procedure Laterality Date    OTHER SURGICAL HISTORY  05/05/2016    Renal Transplant        FAMILY HISTORY  No family history on file.     ALLERGIES  Macrodantin [nitrofurantoin macrocrystal] and Sulfa (sulfonamide antibiotics)    OARRS REVIEW  OARRS checked: yes  OARRS comments: oxy-acetaminophen script filled May 30, 2024    OBJECTIVE    VITALS      6/4/2024     1:45 PM 6/4/2024     1:50 PM 6/4/2024     1:51 PM 6/4/2024     1:53 PM 6/4/2024     1:54 PM 6/4/2024     1:55 PM 6/4/2024     2:00 PM   Vitals   Systolic  133 133 79 114 135    135 125   Diastolic  86 86 56 85 78    78 75   Heart Rate 93 93  113 120    114 120    120 119   Resp 16 17  17 26    16 25    16     "16 16   Height (in)     1.575 m (5' 2.01\")     BMI     39.02 kg/m2     BSA (m2)     2.06 m2          MENTAL STATUS EXAM  Mental status exam is limited at this time as patient is intubated and sedated, and currently undergoing echo with provider.    HOME MEDICATIONS  Medication Documentation Review Audit       Reviewed by Dilip Kenny [facility] (Pharmacist) on 06/04/24 at 0114      Medication Order Taking? Sig Documenting Provider Last Dose Status   acetaminophen (Tylenol) 325 mg tablet [id number] No Take 2 tablets (650 mg) by mouth every 6 hours if needed. Javid Ricks MD Unknown Active   allopurinol (Zyloprim) 100 mg tablet [id number] No Take 1 tablet (100 mg) by mouth once daily. Javid Ricks MD Unknown Active   apixaban (Eliquis) 5 mg tablet [id number] Yes Take 1 tablet (5 mg) by mouth 2 times a day. Javid Ricks MD 6/3/2024 Active   bumetanide (Bumex) 1 mg tablet [id number] Yes Take 1 tablet (1 mg) by mouth once daily. Javid Ricks MD Past Week Active   calcitriol (Rocaltrol) 0.5 mcg capsule [id number] Yes Take 1 capsule (0.5 mcg) by mouth 3 times a week. Mon, Wed, Fri Javid Ricks MD Past Week Active   cholecalciferol (Vitamin D3) 25 MCG (1000 UT) tablet [id number] No Take 1 tablet (1,000 Units) by mouth once daily. Javid Ricks MD Unknown Active   cloZAPine (Clozaril) 50 mg tablet [id number] Yes Take 1 tablet (50 mg) by mouth once daily at bedtime. Javid Ricks MD Past Week Active   cycloSPORINE (SandIMMUNE) 100 mg capsule [id number] Yes Take 1 capsule (100 mg) by mouth 2 times a day. Javid Ricks MD 6/3/2024 Active   desvenlafaxine 100 mg 24 hr tablet [id number] Yes Take 1 tablet (100 mg) by mouth once daily. Javid Ricks MD Past Week Active   docusate sodium (Colace) 100 mg capsule [id number] No Take 1 capsule (100 mg) by mouth as needed at bedtime for constipation. Javid Ricks MD Unknown Active   ferrous sulfate, 325 mg ferrous sulfate, " tablet [id number] Yes Take 1 tablet by mouth once daily with breakfast. Javid Ricks MD 6/3/2024 Active   folic acid (Folvite) 1 mg tablet [id number] Yes Take 1 tablet (1 mg) by mouth once daily. Javid Ricks MD 6/3/2024 Active   levothyroxine (Synthroid, Levoxyl) 50 mcg tablet [id number] Yes Take 1 tablet (50 mcg) by mouth once daily in the morning. Javid Ricks MD 6/3/2024 Active   metoprolol tartrate (Lopressor) 25 mg tablet [id number] Yes Take 1 tablet (25 mg) by mouth 2 times a day. Javid Ricks MD 6/3/2024 Active   mycophenolate (Cellcept) 500 mg tablet [id number] Yes Take 2 tablets (1,000 mg) by mouth 2 times a day. Javid Ricks MD 6/3/2024 Active   predniSONE (Deltasone) 5 mg tablet [id number] Yes Take 1 tablet (5 mg) by mouth once daily. Javid Ricks MD 6/3/2024 Active   simvastatin (Zocor) 40 mg tablet [id number] Yes Take 1 tablet (40 mg) by mouth once daily at bedtime. Javid Provider, MD Past Week Active   vancomycin (Vancocin) 125 mg capsule [id number] Yes Take 1 capsule (125 mg) by mouth 3 times a week. Mon, Wed, Fri Historical Provider, MD Past Week Active                     CURRENT MEDICATIONS  Scheduled medications  cycloSPORINE, 100 mg, oral, q12h MARTIN  fentaNYL, 25 mcg, intravenous, Once  fentaNYL, , ,   fentaNYL PF, , ,   fentaNYL PF, 100 mcg, intravenous, Once  ipratropium-albuteroL, 3 mL, nebulization, q6h  lactated Ringer's, 500 mL, intravenous, Once  levothyroxine, 50 mcg, oral, q AM  meropenem, 1 g, intravenous, q12h  midazolam, , ,   midazolam, 4 mg, intravenous, Once  mycophenolate, 500 mg, oral, BID  oxygen, , inhalation, Continuous - Inhalation  polyethylene glycol, 17 g, oral, BID  predniSONE, 5 mg, oral, Daily  sennosides-docusate sodium, 2 tablet, oral, BID  simvastatin, 40 mg, oral, Nightly  sodium phosphate, 15 mmol, intravenous, Once        Continuous medications  dexmedeTOMIDine, 0.2-1.5 mcg/kg/hr  fentaNYL, 0-300 mcg/hr  heparin,  0-4,500 Units/hr        PRN medications  PRN medications: dextrose, dextrose, fentaNYL, fentaNYL, fentaNYL PF, glucagon, glucagon, heparin, midazolam, vancomycin     LABS  Results for orders placed or performed during the hospital encounter of 06/03/24 (from the past 24 hour(s))   POCT GLUCOSE   Result Value Ref Range    POCT Glucose 37 (L) 74 - 99 mg/dL   POCT GLUCOSE   Result Value Ref Range    POCT Glucose 194 (H) 74 - 99 mg/dL   Blood Gas Venous Full Panel   Result Value Ref Range    POCT pH, Venous 7.23 (LL) 7.33 - 7.43 pH    POCT pCO2, Venous 66 (H) 41 - 51 mm Hg    POCT pO2, Venous 41 35 - 45 mm Hg    POCT SO2, Venous 75 45 - 75 %    POCT Oxy Hemoglobin, Venous 73.0 45.0 - 75.0 %    POCT Hematocrit Calculated, Venous 42.0 36.0 - 46.0 %    POCT Sodium, Venous 139 136 - 145 mmol/L    POCT Potassium, Venous 3.8 3.5 - 5.3 mmol/L    POCT Chloride, Venous 107 98 - 107 mmol/L    POCT Ionized Calicum, Venous 1.40 (H) 1.10 - 1.33 mmol/L    POCT Glucose, Venous 36 (LL) 74 - 99 mg/dL    POCT Lactate, Venous 1.0 0.4 - 2.0 mmol/L    POCT Base Excess, Venous -1.5 -2.0 - 3.0 mmol/L    POCT HCO3 Calculated, Venous 27.6 (H) 22.0 - 26.0 mmol/L    POCT Hemoglobin, Venous 14.0 12.0 - 16.0 g/dL    POCT Anion Gap, Venous 8.0 (L) 10.0 - 25.0 mmol/L    Patient Temperature 37.0 degrees Celsius    FiO2 21 %   Cyclosporine level   Result Value Ref Range    Cyclosporine 91 80 - 210 ng/mL   Procalcitonin   Result Value Ref Range    Procalcitonin 0.41 (H) <=0.07 ng/mL   HIV 1/2 Antigen/Antibody Screen with Reflex to Confirmation   Result Value Ref Range    HIV 1/2 Antigen/Antibody Screen with Reflex to Confirmation Nonreactive Nonreactive   Urinalysis with Reflex Microscopic   Result Value Ref Range    Color, Urine Yellow Light-Yellow, Yellow, Dark-Yellow    Appearance, Urine Turbid (N) Clear    Specific Gravity, Urine 1.019 1.005 - 1.035    pH, Urine 6.0 5.0, 5.5, 6.0, 6.5, 7.0, 7.5, 8.0    Protein, Urine 70 (1+) (A) NEGATIVE, 10  (TRACE), 20 (TRACE) mg/dL    Glucose, Urine Normal Normal mg/dL    Blood, Urine 0.2 (2+) (A) NEGATIVE    Ketones, Urine NEGATIVE NEGATIVE mg/dL    Bilirubin, Urine NEGATIVE NEGATIVE    Urobilinogen, Urine Normal Normal mg/dL    Nitrite, Urine NEGATIVE NEGATIVE    Leukocyte Esterase, Urine 500 Harrison/µL (A) NEGATIVE   Urine electrolytes   Result Value Ref Range    Sodium, Urine Random 21 mmol/L    Sodium/Creatinine Ratio 23 Not established. mmol/g Creat    Potassium, Urine Random 31 mmol/L    Potassium/Creatinine Ratio 34 Not established mmol/g Creat    Chloride, Urine Random 28 mmol/L    Chloride/Creatinine Ratio 31 (L) 38 - 318 mmol/g creat    Creatinine, Urine Random 91.0 20.0 - 320.0 mg/dL   Drug Screen, Urine   Result Value Ref Range    Amphetamine Screen, Urine Presumptive Negative Presumptive Negative    Barbiturate Screen, Urine Presumptive Negative Presumptive Negative    Benzodiazepines Screen, Urine Presumptive Negative Presumptive Negative    Cannabinoid Screen, Urine Presumptive Negative Presumptive Negative    Cocaine Metabolite Screen, Urine Presumptive Negative Presumptive Negative    Fentanyl Screen, Urine Presumptive Negative Presumptive Negative    Opiate Screen, Urine Presumptive Negative Presumptive Negative    Oxycodone Screen, Urine Presumptive Positive (A) Presumptive Negative    PCP Screen, Urine Presumptive Negative Presumptive Negative    Methadone Screen, Urine Presumptive Negative Presumptive Negative   Microscopic Only, Urine   Result Value Ref Range    WBC, Urine >50 (A) 1-5, NONE /HPF    WBC Clumps, Urine RARE Reference range not established. /HPF    RBC, Urine 11-20 (A) NONE, 1-2, 3-5 /HPF    Mucus, Urine FEW Reference range not established. /LPF    Hyaline Casts, Urine 1+ (A) NONE /LPF    Fine Granular Casts, Urine OCCASIONAL (A) NONE /LPF   Protein, Urine Random   Result Value Ref Range    Total Protein, Urine Random 152 (H) 5 - 25 mg/dL   Comprehensive metabolic panel   Result Value  Ref Range    Glucose 36 (LL) 74 - 99 mg/dL    Sodium 146 (H) 136 - 145 mmol/L    Potassium 4.2 3.5 - 5.3 mmol/L    Chloride 109 (H) 98 - 107 mmol/L    Bicarbonate 27 21 - 32 mmol/L    Anion Gap 14 10 - 20 mmol/L    Urea Nitrogen 30 (H) 6 - 23 mg/dL    Creatinine 1.70 (H) 0.50 - 1.05 mg/dL    eGFR 33 (L) >60 mL/min/1.73m*2    Calcium 9.5 8.6 - 10.6 mg/dL    Albumin 2.8 (L) 3.4 - 5.0 g/dL    Alkaline Phosphatase 57 33 - 136 U/L    Total Protein 5.2 (L) 6.4 - 8.2 g/dL    AST 18 9 - 39 U/L    Bilirubin, Total 0.6 0.0 - 1.2 mg/dL    ALT 7 7 - 45 U/L   Magnesium   Result Value Ref Range    Magnesium 1.33 (L) 1.60 - 2.40 mg/dL   Ammonia   Result Value Ref Range    Ammonia 28 16 - 53 umol/L   TSH with reflex to Free T4 if abnormal   Result Value Ref Range    Thyroid Stimulating Hormone 0.45 0.44 - 3.98 mIU/L   Troponin I, High Sensitivity   Result Value Ref Range    Troponin I, High Sensitivity 79 (H) 0 - 34 ng/L   B-type natriuretic peptide   Result Value Ref Range     (H) 0 - 99 pg/mL   PTH, Intact   Result Value Ref Range    Parathyroid Hormone, Intact 53.6 18.5 - 88.0 pg/mL   Blood Culture    Specimen: Peripheral Venipuncture; Blood culture   Result Value Ref Range    Blood Culture Loaded on Instrument - Culture in progress    Blood Culture    Specimen: Peripheral Venipuncture; Blood culture   Result Value Ref Range    Blood Culture Loaded on Instrument - Culture in progress    POCT GLUCOSE   Result Value Ref Range    POCT Glucose 75 74 - 99 mg/dL   POCT GLUCOSE   Result Value Ref Range    POCT Glucose 196 (H) 74 - 99 mg/dL   Blood Gas Arterial Full Panel Unsolicited   Result Value Ref Range    POCT pH, Arterial 7.31 (L) 7.38 - 7.42 pH    POCT pCO2, Arterial 56 (H) 38 - 42 mm Hg    POCT pO2, Arterial 80 (L) 85 - 95 mm Hg    POCT SO2, Arterial 98 94 - 100 %    POCT Oxy Hemoglobin, Arterial 95.5 94.0 - 98.0 %    POCT Hematocrit Calculated, Arterial 38.0 36.0 - 46.0 %    POCT Sodium, Arterial 137 136 - 145 mmol/L     POCT Potassium, Arterial 3.9 3.5 - 5.3 mmol/L    POCT Chloride, Arterial 108 (H) 98 - 107 mmol/L    POCT Ionized Calcium, Arterial 1.39 (H) 1.10 - 1.33 mmol/L    POCT Glucose, Arterial 236 (H) 74 - 99 mg/dL    POCT Lactate, Arterial 0.7 0.4 - 2.0 mmol/L    POCT Base Excess, Arterial 0.9 -2.0 - 3.0 mmol/L    POCT HCO3 Calculated, Arterial 28.2 (H) 22.0 - 26.0 mmol/L    POCT Hemoglobin, Arterial 12.6 12.0 - 16.0 g/dL    POCT Anion Gap, Arterial 5 (L) 10 - 25 mmo/L    Patient Temperature     Light Blue Top   Result Value Ref Range    Extra Tube Hold for add-ons.    POCT GLUCOSE   Result Value Ref Range    POCT Glucose 74 74 - 99 mg/dL   POCT GLUCOSE   Result Value Ref Range    POCT Glucose 171 (H) 74 - 99 mg/dL   CBC and Auto Differential   Result Value Ref Range    WBC 20.0 (H) 4.4 - 11.3 x10*3/uL    nRBC 0.1 (H) 0.0 - 0.0 /100 WBCs    RBC 3.47 (L) 4.00 - 5.20 x10*6/uL    Hemoglobin 12.2 12.0 - 16.0 g/dL    Hematocrit 39.9 36.0 - 46.0 %     (H) 80 - 100 fL    MCH 35.2 (H) 26.0 - 34.0 pg    MCHC 30.6 (L) 32.0 - 36.0 g/dL    RDW 16.4 (H) 11.5 - 14.5 %    Platelets 111 (L) 150 - 450 x10*3/uL    Neutrophils % 91.2 40.0 - 80.0 %    Immature Granulocytes %, Automated 1.7 (H) 0.0 - 0.9 %    Lymphocytes % 2.3 13.0 - 44.0 %    Monocytes % 4.3 2.0 - 10.0 %    Eosinophils % 0.3 0.0 - 6.0 %    Basophils % 0.2 0.0 - 2.0 %    Neutrophils Absolute 18.25 (H) 1.20 - 7.70 x10*3/uL    Immature Granulocytes Absolute, Automated 0.34 0.00 - 0.70 x10*3/uL    Lymphocytes Absolute 0.45 (L) 1.20 - 4.80 x10*3/uL    Monocytes Absolute 0.86 0.10 - 1.00 x10*3/uL    Eosinophils Absolute 0.05 0.00 - 0.70 x10*3/uL    Basophils Absolute 0.03 0.00 - 0.10 x10*3/uL   Vitamin D 25-Hydroxy,Total (for eval of Vitamin D levels)   Result Value Ref Range    Vitamin D, 25-Hydroxy, Total 35 30 - 100 ng/mL   Troponin I, High Sensitivity   Result Value Ref Range    Troponin I, High Sensitivity 60 (H) 0 - 34 ng/L   Pathologist Review-CBC Differential    Result Value Ref Range    Pathologist Review-CBC Differential       Neutrophilia.  Macrocytosis with no specific RBC morphology.  Mild thrombocytopenia.  Clinical correlation is recommended.        POCT GLUCOSE   Result Value Ref Range    POCT Glucose 121 (H) 74 - 99 mg/dL   Comprehensive metabolic panel   Result Value Ref Range    Glucose 95 74 - 99 mg/dL    Sodium 142 136 - 145 mmol/L    Potassium 3.4 (L) 3.5 - 5.3 mmol/L    Chloride 109 (H) 98 - 107 mmol/L    Bicarbonate 28 21 - 32 mmol/L    Anion Gap 8 (L) 10 - 20 mmol/L    Urea Nitrogen 26 (H) 6 - 23 mg/dL    Creatinine 1.42 (H) 0.50 - 1.05 mg/dL    eGFR 40 (L) >60 mL/min/1.73m*2    Calcium 9.3 8.6 - 10.6 mg/dL    Albumin 2.7 (L) 3.4 - 5.0 g/dL    Alkaline Phosphatase 48 33 - 136 U/L    Total Protein 4.9 (L) 6.4 - 8.2 g/dL    AST 12 9 - 39 U/L    Bilirubin, Total 0.5 0.0 - 1.2 mg/dL    ALT 7 7 - 45 U/L   Calcium, ionized   Result Value Ref Range    POCT Calcium, Ionized 1.38 (H) 1.1 - 1.33 mmol/L   Magnesium   Result Value Ref Range    Magnesium 2.35 1.60 - 2.40 mg/dL   Cortisol AM   Result Value Ref Range    Cortisol  A.M. 29.2 (H) 5.0 - 20.0 ug/dL   Coagulation Screen   Result Value Ref Range    Protime 16.6 (H) 9.8 - 12.8 seconds    INR 1.5 (H) 0.9 - 1.1    aPTT 29 27 - 38 seconds   Lipid Panel   Result Value Ref Range    Cholesterol 108 0 - 199 mg/dL    HDL-Cholesterol 29.2 mg/dL    Cholesterol/HDL Ratio 3.7     LDL Calculated 62 <=99 mg/dL    VLDL 16 0 - 40 mg/dL    Triglycerides 82 0 - 149 mg/dL    Non HDL Cholesterol 79 0 - 149 mg/dL   Blood Gas Venous Full Panel   Result Value Ref Range    POCT pH, Venous 7.21 (LL) 7.33 - 7.43 pH    POCT pCO2, Venous 68 (H) 41 - 51 mm Hg    POCT pO2, Venous 70 (H) 35 - 45 mm Hg    POCT SO2, Venous 96 (H) 45 - 75 %    POCT Oxy Hemoglobin, Venous 93.7 (H) 45.0 - 75.0 %    POCT Hematocrit Calculated, Venous 38.0 36.0 - 46.0 %    POCT Sodium, Venous 138 136 - 145 mmol/L    POCT Potassium, Venous 3.2 (L) 3.5 - 5.3 mmol/L     POCT Chloride, Venous 108 (H) 98 - 107 mmol/L    POCT Ionized Calicum, Venous 1.42 (H) 1.10 - 1.33 mmol/L    POCT Glucose, Venous 84 74 - 99 mg/dL    POCT Lactate, Venous 0.6 0.4 - 2.0 mmol/L    POCT Base Excess, Venous -2.1 (L) -2.0 - 3.0 mmol/L    POCT HCO3 Calculated, Venous 27.2 (H) 22.0 - 26.0 mmol/L    POCT Hemoglobin, Venous 12.7 12.0 - 16.0 g/dL    POCT Anion Gap, Venous 6.0 (L) 10.0 - 25.0 mmol/L    Patient Temperature 37.0 degrees Celsius    FiO2 36 %    Apparatus CANNULA    C-Peptide   Result Value Ref Range    C-Peptide 5.2 (H) 0.7 - 3.9 ng/mL   POCT GLUCOSE   Result Value Ref Range    POCT Glucose 99 74 - 99 mg/dL   POCT GLUCOSE   Result Value Ref Range    POCT Glucose 67 (L) 74 - 99 mg/dL   POCT GLUCOSE   Result Value Ref Range    POCT Glucose 163 (H) 74 - 99 mg/dL   Heparin Assay, UFH   Result Value Ref Range    Heparin Unfractionated >2.0 (HH) See Comment Below for Therapeutic Ranges IU/mL   Coagulation Screen   Result Value Ref Range    Protime 16.6 (H) 9.8 - 12.8 seconds    INR 1.5 (H) 0.9 - 1.1    aPTT 49 (H) 27 - 38 seconds   POCT GLUCOSE   Result Value Ref Range    POCT Glucose 61 (L) 74 - 99 mg/dL   POCT GLUCOSE   Result Value Ref Range    POCT Glucose 151 (H) 74 - 99 mg/dL   CBC and Auto Differential   Result Value Ref Range    WBC 14.7 (H) 4.4 - 11.3 x10*3/uL    nRBC 0.0 0.0 - 0.0 /100 WBCs    RBC 3.04 (L) 4.00 - 5.20 x10*6/uL    Hemoglobin 10.9 (L) 12.0 - 16.0 g/dL    Hematocrit 36.3 36.0 - 46.0 %     (H) 80 - 100 fL    MCH 35.9 (H) 26.0 - 34.0 pg    MCHC 30.0 (L) 32.0 - 36.0 g/dL    RDW 16.5 (H) 11.5 - 14.5 %    Platelets 88 (L) 150 - 450 x10*3/uL    Neutrophils % 93.8 40.0 - 80.0 %    Immature Granulocytes %, Automated 1.5 (H) 0.0 - 0.9 %    Lymphocytes % 1.6 13.0 - 44.0 %    Monocytes % 2.6 2.0 - 10.0 %    Eosinophils % 0.3 0.0 - 6.0 %    Basophils % 0.2 0.0 - 2.0 %    Neutrophils Absolute 13.77 (H) 1.20 - 7.70 x10*3/uL    Immature Granulocytes Absolute, Automated 0.22 0.00 -  0.70 x10*3/uL    Lymphocytes Absolute 0.23 (L) 1.20 - 4.80 x10*3/uL    Monocytes Absolute 0.38 0.10 - 1.00 x10*3/uL    Eosinophils Absolute 0.04 0.00 - 0.70 x10*3/uL    Basophils Absolute 0.03 0.00 - 0.10 x10*3/uL   Type and screen   Result Value Ref Range    ABO TYPE A     Rh TYPE POS     ANTIBODY SCREEN NEG    B-type natriuretic peptide   Result Value Ref Range     (H) 0 - 99 pg/mL   Light Blue Top   Result Value Ref Range    Extra Tube Hold for add-ons.    Green Top   Result Value Ref Range    Extra Tube Hold for add-ons.    Gray Top   Result Value Ref Range    Extra Tube Hold for add-ons.    POCT GLUCOSE   Result Value Ref Range    POCT Glucose 104 (H) 74 - 99 mg/dL   Blood Gas Arterial Full Panel   Result Value Ref Range    POCT pH, Arterial 7.25 (LL) 7.38 - 7.42 pH    POCT pCO2, Arterial 65 (H) 38 - 42 mm Hg    POCT pO2, Arterial 84 (L) 85 - 95 mm Hg    POCT SO2, Arterial 98 94 - 100 %    POCT Oxy Hemoglobin, Arterial 94.7 94.0 - 98.0 %    POCT Hematocrit Calculated, Arterial 50.0 (H) 36.0 - 46.0 %    POCT Sodium, Arterial 136 136 - 145 mmol/L    POCT Potassium, Arterial 3.7 3.5 - 5.3 mmol/L    POCT Chloride, Arterial 106 98 - 107 mmol/L    POCT Ionized Calcium, Arterial 1.35 (H) 1.10 - 1.33 mmol/L    POCT Glucose, Arterial 109 (H) 74 - 99 mg/dL    POCT Lactate, Arterial 0.7 0.4 - 2.0 mmol/L    POCT Base Excess, Arterial -0.8 -2.0 - 3.0 mmol/L    POCT HCO3 Calculated, Arterial 28.5 (H) 22.0 - 26.0 mmol/L    POCT Hemoglobin, Arterial 16.7 (H) 12.0 - 16.0 g/dL    POCT Anion Gap, Arterial 5 (L) 10 - 25 mmo/L    Patient Temperature 37.0 degrees Celsius    FiO2 91 %   CBC and Auto Differential   Result Value Ref Range    WBC 18.7 (H) 4.4 - 11.3 x10*3/uL    nRBC 0.1 (H) 0.0 - 0.0 /100 WBCs    RBC 3.64 (L) 4.00 - 5.20 x10*6/uL    Hemoglobin 12.9 12.0 - 16.0 g/dL    Hematocrit 42.5 36.0 - 46.0 %     (H) 80 - 100 fL    MCH 35.4 (H) 26.0 - 34.0 pg    MCHC 30.4 (L) 32.0 - 36.0 g/dL    RDW 16.4 (H) 11.5 -  14.5 %    Platelets 105 (L) 150 - 450 x10*3/uL    Neutrophils % 91.3 40.0 - 80.0 %    Immature Granulocytes %, Automated 1.3 (H) 0.0 - 0.9 %    Lymphocytes % 2.2 13.0 - 44.0 %    Monocytes % 4.6 2.0 - 10.0 %    Eosinophils % 0.4 0.0 - 6.0 %    Basophils % 0.2 0.0 - 2.0 %    Neutrophils Absolute 17.11 (H) 1.20 - 7.70 x10*3/uL    Immature Granulocytes Absolute, Automated 0.24 0.00 - 0.70 x10*3/uL    Lymphocytes Absolute 0.41 (L) 1.20 - 4.80 x10*3/uL    Monocytes Absolute 0.86 0.10 - 1.00 x10*3/uL    Eosinophils Absolute 0.08 0.00 - 0.70 x10*3/uL    Basophils Absolute 0.03 0.00 - 0.10 x10*3/uL   Renal function panel   Result Value Ref Range    Glucose 89 74 - 99 mg/dL    Sodium 144 136 - 145 mmol/L    Potassium 5.5 (H) 3.5 - 5.3 mmol/L    Chloride 108 (H) 98 - 107 mmol/L    Bicarbonate 28 21 - 32 mmol/L    Anion Gap 14 10 - 20 mmol/L    Urea Nitrogen 25 (H) 6 - 23 mg/dL    Creatinine 1.37 (H) 0.50 - 1.05 mg/dL    eGFR 42 (L) >60 mL/min/1.73m*2    Calcium 9.1 8.6 - 10.6 mg/dL    Phosphorus 2.0 (L) 2.5 - 4.9 mg/dL    Albumin 2.8 (L) 3.4 - 5.0 g/dL   Magnesium   Result Value Ref Range    Magnesium 2.35 1.60 - 2.40 mg/dL   Blood Gas Venous Full Panel   Result Value Ref Range    POCT pH, Venous 7.20 (LL) 7.33 - 7.43 pH    POCT pCO2, Venous 73 (HH) 41 - 51 mm Hg    POCT pO2, Venous 41 35 - 45 mm Hg    POCT SO2, Venous 71 45 - 75 %    POCT Oxy Hemoglobin, Venous 69.1 45.0 - 75.0 %    POCT Hematocrit Calculated, Venous 41.0 36.0 - 46.0 %    POCT Sodium, Venous 136 136 - 145 mmol/L    POCT Potassium, Venous 5.9 (H) 3.5 - 5.3 mmol/L    POCT Chloride, Venous 106 98 - 107 mmol/L    POCT Ionized Calicum, Venous 1.33 1.10 - 1.33 mmol/L    POCT Glucose, Venous 95 74 - 99 mg/dL    POCT Lactate, Venous 1.1 0.4 - 2.0 mmol/L    POCT Base Excess, Venous -1.4 -2.0 - 3.0 mmol/L    POCT HCO3 Calculated, Venous 28.5 (H) 22.0 - 26.0 mmol/L    POCT Hemoglobin, Venous 13.6 12.0 - 16.0 g/dL    POCT Anion Gap, Venous 7.0 (L) 10.0 - 25.0 mmol/L     Patient Temperature 37.0 degrees Celsius    FiO2 40 %   POCT GLUCOSE   Result Value Ref Range    POCT Glucose 99 74 - 99 mg/dL   Heparin Assay, UFH   Result Value Ref Range    Heparin Unfractionated 1.9 (HH) See Comment Below for Therapeutic Ranges IU/mL   Calcium, ionized   Result Value Ref Range    POCT Calcium, Ionized 1.31 1.1 - 1.33 mmol/L        IMAGING  XR chest 1 view    Result Date: 6/4/2024  Interpreted By:  Dennise Beck and Hofer Lindsay STUDY: XR CHEST 1 VIEW;  6/4/2024 10:55 am   INDICATION: Signs/Symptoms:Shortness of breath.   COMPARISON: Chest radiograph 06/03/2024   ACCESSION NUMBER(S): [id number]   ORDERING CLINICIAN: TANK PALMER   FINDINGS: AP radiograph of the chest was provided.   CARDIOMEDIASTINAL SILHOUETTE: Cardiomediastinal silhouette is stable in size and configuration.   LUNGS: Blunting of the right costophrenic angle with adjacent hazy opacity. Linear opacities in the bibasilar lung fields. Low bronchovascular crowding secondary to low lung volumes.   ABDOMEN: No remarkable upper abdominal findings.   BONES: No acute osseous changes.       1.  Similar appearance of small right pleural effusion with adjacent atelectasis/consolidation.   I personally reviewed the images/study and resident's interpretation and I agree with the findings as stated by Cherelle Flores MD (resident radiologist). This study was analyzed and interpreted at [place], [place].   MACRO: None   Signed by: Dennise Beck 6/4/2024 11:48 AM Dictation workstation:   [id number]    XR abdomen 1 view    Result Date: 6/4/2024  Interpreted By:  Dennise Beck and Dervishi Mario STUDY: XR ABDOMEN 1 VIEW; XR CHEST 1 VIEW;  6/3/2024 11:11 pm; 6/3/2024 11:12 pm   INDICATION: Signs/Symptoms:Abdominal pain; Signs/Symptoms:New oxygen requirement.   COMPARISON: Chest and abdomen radiograph: 02/04/2022   ACCESSION NUMBER(S): [id number]; [id number]   ORDERING CLINICIAN: AYDE  MADHU   FINDINGS: AP radiograph of the chest and abdomen were acquired and combined for interpretation.       CARDIOMEDIASTINAL SILHOUETTE: Cardiomediastinal silhouette is normal in size and configuration.   LUNGS: The right costophrenic angle demonstrates blunting suggestive of a small pleural effusion. There are low lung volumes with associated bronchovascular crowding. There are bandlike opacities in the retrocardiac and lower lung lobe regions suggestive of atelectasis. No focal consolidations. No pneumothorax.   ABDOMEN: There is a prominent gastric bubble. Bowel gas pattern is otherwise nonobstructive. There is large amount of retained stool throughout the colon and rectum. No evidence of pneumoperitoneum within the limitations of supine radiographs.   BONES: No acute osseous changes.       1.  Right-sided pleural effusion with associated atelectasis/consolidation. 2. No acute radiographic findings of the abdomen. Large amount of retained stool throughout the colon rectum. 3. Additional findings as above.   I personally reviewed the images/study and I agree with the findings as stated by Resident Nato Cleveland MD. This study was interpreted at [place], [place].   MACRO: None   Signed by: Dennise Beck 6/4/2024 9:47 AM Dictation workstation:   [id number]    XR chest 1 view    Result Date: 6/4/2024  Interpreted By:  Dennise Beck and Dervishi Mario STUDY: XR ABDOMEN 1 VIEW; XR CHEST 1 VIEW;  6/3/2024 11:11 pm; 6/3/2024 11:12 pm   INDICATION: Signs/Symptoms:Abdominal pain; Signs/Symptoms:New oxygen requirement.   COMPARISON: Chest and abdomen radiograph: 02/04/2022   ACCESSION NUMBER(S): [id number]; [id number]   ORDERING CLINICIAN: AYDE LEUNG   FINDINGS: AP radiograph of the chest and abdomen were acquired and combined for interpretation.       CARDIOMEDIASTINAL SILHOUETTE: Cardiomediastinal silhouette is normal in size and configuration.    LUNGS: The right costophrenic angle demonstrates blunting suggestive of a small pleural effusion. There are low lung volumes with associated bronchovascular crowding. There are bandlike opacities in the retrocardiac and lower lung lobe regions suggestive of atelectasis. No focal consolidations. No pneumothorax.   ABDOMEN: There is a prominent gastric bubble. Bowel gas pattern is otherwise nonobstructive. There is large amount of retained stool throughout the colon and rectum. No evidence of pneumoperitoneum within the limitations of supine radiographs.   BONES: No acute osseous changes.       1.  Right-sided pleural effusion with associated atelectasis/consolidation. 2. No acute radiographic findings of the abdomen. Large amount of retained stool throughout the colon rectum. 3. Additional findings as above.   I personally reviewed the images/study and I agree with the findings as stated by Resident Nato Cleveland MD. This study was interpreted at [place], [place].   MACRO: None   Signed by: Dennise Beck 6/4/2024 9:47 AM Dictation workstation:   [id number]    XR abdomen 1 view    Result Date: 6/2/2024  EXAMINATION: ONE SUPINE XRAY VIEW(S) OF THE ABDOMEN 6/2/2024 6:28 pm COMPARISON: Correlation made with CT abdomen pelvis from June 1, 2024 HISTORY: ORDERING SYSTEM PROVIDED HISTORY: Distended pain TECHNOLOGIST PROVIDED HISTORY: Reason for exam:->Distended pain FINDINGS: No evidence of bowel obstruction or pneumoperitoneum.  Moderate amount of stool seen throughout the colon.  IVC filter is present.    1. Moderate amount of stool throughout the colon. 2. No bowel obstruction or evidence of pneumoperitoneum.      PSYCHIATRIC RISK ASSESSMENT  Violence Risk Factors:  stress/destabilizers  Acute Risk of Harm to Others is Considered: Low  Suicide Risk Factors: none  Protective Factors: none  Acute Risk of Harm to Self is Considered: Low  - Limited assessment given that pt is  "currently intubated and sedated.    ASSESSMENT AND PLAN  69 y.o. female with psych history of unspecified bipolar disorder and past medical history COPD, renal transplant in 2015 on cyclosporine, mycophenolate and prednisone, HTN, HLD, hypothyroid, hyperparathyroid, HFmEF (45-50%), AFib, PVD, lymphedema, who initially presented to [facility] on 6/1/24 for epigastric pain, found to have acute pancreatitis and emphysematous pyelonephritis for which she was transferred to [facility]. Patient subsequently transferred to MICU on 6/4/24 due to acute respiratory distress. Patient currently intubated and sedated. Psych consulted for med management, as pt had been on Clozapine as outpatient.     Per collateral from [facility] - last dose of Clozapine 6/2 at 21:12. Absolute neutrophil counts have been elevated since admission.    Patient currently intubated and sedated.  Attempted to see patient but she is currently undergoing echo procedure.  Following echo, patient will go for CT imaging.  Per medication history, patient has been on Clozapine 50 mg as outpatient.  As she received the most recent dose within 48 hours, and given her current ANC, it would be appropriate to resume her home dose of Clozapine.    IMPRESSION  Bipolar Disorder, unspecified, per hx    RECOMMENDATIONS    Safety:  - Patient does not currently meet criteria for inpatient psychiatric admission.   - To evaluate decision-making capacity, recommend use of the Capacity Evaluation Tool. Search “[facility] Capacity Evaluation under SmartText\" unless the patient has a legal guardian, in which case all decisions per the legal guardian.  - Patient does not require a 1:1 sitter from a psychiatric perspective at this time.  - Defer to primary team decision for 1:1 sitter.  - As with all hospitalized patients, would recommend delirium precautions, as below.    Workup:  - Most recent ANC=17.11    Medications:  - Resume home Clozapine 50 mg PO at bedtime.   - Okay to " hold home Desvenlafaxine  mg for now (unable to be crushed)   - Re-assess once pt is able to tolerate PO.    Follow-up:  - Follows with psychiatrist, Dr. Cordova in [place], [place] as outpatient.    - Discussed recommendations with primary team.  - Psychiatry will continue to follow.    Thank you for allowing us to participate in the care of this patient. Please page [phone] with any questions or concerns.    Patient discussed with Dr. Blancas, who agrees with above plan.    Medication Consent  Medication Consent: n/a; consult service    Devin Anglin MD

## 2024-06-04 NOTE — H&P
History Of Present Illness  Lea Paulino is a 69 y.o. female with a history of COPD, renal transplant in 2015 on cyclosporine, mycophenolate and prednisone, HTN, HLD, hypothyroid, hyperparathyroid, HFmEF (45-50%), AFib, PVD, lymphedema, admitted for emphysematous pyelonephritis and acute pancreatitis.     Patient initially presented to [facility] on 6/1/2024 for epigastric pain and dysuria and fatigue, had couple of months of leg pain, had epidural steroid injection few days ago with pain management.  Workup showed worsening creatinine at 2.7 (baseline 1.5-1.7), calcium 15.4 (up from 10.1 in 8), lactic acid normal, troponin 66.61, lipase 1033, WBC 15.2, urinalysis with moderate leukocyte esterasse, tiny stone in right lower quadrant transplant kidney, CT abdomen pelvis showing pancreatic inflammation, large stool burden with gas in collecting system and urinary bladder which was concerning for an infection with a gas-forming organism.  Patient was diagnosed with acute pancreatitis and was n.p.o. and started on IV fluids.  ID consulted and patient was initially on Vanco Zosyn and then was given doses of meropenem and gentamicin.  Cardiology consulted for a reported nonsustained VT of unclear duration and recommended increasing beta-blocker and repeat echo.  Nephrology consulted and recommended continued IV resuscitation, continue current immunosuppressive regimen hypercalcemia workup.  Patient transferred for further management given she had her renal transplant here in 2015.    Upon admission, patient was unable to provide further history, she is alert and oriented x1.  Looks uncomfortable laying in bed no signs of abdominal guarding, hemodynamically stable. Glucose found to be 37 and an amp of dextrose given. Transplant ID consulted for guidance on antibiotics.  Transplant nephrology aware of patient.    Soon after admission, code margie was called for patient's VBG showing pH 7.23 and pCO2 66 with a  lactate of 1.0 and patient only AAO x 1, glucose 36.  ABG was obtained showing pH 7.41, pCO2 56, pO2 80, lactate 0.7, HCO3 28 while patient on 4 L nasal cannula.  Patient was given 1 L LR bolus and started on maintenance fluids with normal saline 150 mL/h.  Patient received 2 A of D50.  Patient hemodynamically stable with temperature 36.7 °C, heart rate 93, respiratory rate 24, blood pressure 133/90. Mg found to be 1,.3 and 6 g IV magnesium ordered.    Patient was later wet on physical exam with concerns of fluid overload, maintenance fluids held temporarily and IV Lasix 40 mg given.     - Vitals:   T 36.7, HR 95, /84, RR 20, SpO2 95 on 4L NC  - Labs:   CBC: WBC 20, HgB 12.2, Plt 111   BMP: Na 146, K 4.2, Cl 109, HCO3 27, BUN 30, Cr 1.7, glu 36   LFT: Ca 9.5, tprot 5.2, alb 2.8, alkphos 57, AST 7, ALT 18, tbili 0.6   Electrolytes: Mg 1.3   hs-TnI 79 --> ,    BCx x2 drawn   Procal 0.4   Urinalysis: Blood +2, , Nitrite negative   Urine cultures drawn   Ammonia 28     - Imaging:  CXR 6/3/2024:  IMPRESSION:  1.  Right-sided pleural effusion with associated  atelectasis/consolidation.  2. No acute radiographic findings of the abdomen. Large amount of  retained stool throughout the colon rectum.  3. Additional findings as above.    KUB 6/3/2024:  ABDOMEN:  There is a prominent gastric bubble. Bowel gas pattern is otherwise  nonobstructive. There is large amount of retained stool throughout  the colon and rectum. No evidence of pneumoperitoneum within the  limitations of supine radiographs.    Xray abdomen 6/2/2024:  1. Moderate amount of stool throughout the colon.   2. No bowel obstruction or evidence of pneumoperitoneum.     CT A/P 6/1/2024  Impression:  1. Fullness and inflammation of the pancreatic head and body most suggestive   of pancreatitis but suboptimally evaluated without IV contrast. Recommend   clinical and laboratory correlation for pancreatitis and consider follow-up   with contrast  enhanced imaging to exclude underlying mass.   2. Large colonic stool burden, which may indicate constipation.   3. Right lower quadrant transplant kidney. Tiny stone in the transplant   kidney. Some gas in the right lower quadrant transplant kidney collecting   system and the urinary bladder, which could be related to recent   instrumentation or infection with gas-forming organism.     - EKG:  Pending     - Echocardiography:  TTE 1/2022:  CONCLUSIONS:   1. The left ventricular systolic function is mildly decreased with a 45-50% estimated ejection fraction.   2. Poorly visualized anatomical structures due to suboptimal image quality.   3. Spectral Doppler shows an impaired relaxation pattern of left ventricular diastolic filling.   4. Mild aortic valve stenosis.      PMH: HIV asymptomatic, kidney transplanet, hepatitis b?  SurgHx: renal transplant 2015  Allergies: Nitrofurantoin, sulfa drugs  SocHx: not on file  FamHx: : unable to obtain    Home Medications   Prednisone 5 mg  Folic acid 1 mg  Allopurinol 100 mg  Levothyroxine 50 mcg  Apixaban 5 mg  Simvastatin 40 mg  Bumex 1 mg  Desvenlafaxine 100 mg  Calcitriol 0,25 mcg  Clozapine 50 mg  Mycophnolate 500 mg  Cyclosporine 100 mg    Constitutional: appears ill  HEENT: sclerae anicteric, EOM grossly intact  CV: RRR, no murmurs noted  Pulm: shallow abdominal breathing  GI: abd soft, ND, tender to palpation over epigastric region  Skin: warm and wet  Ext: bilateral LE with+2 pitting edema   Neuro: AOx1  Psych: unable to assess    Assessment & Plan  Lea Paulino is a 69 y.o. female with a history of COPD, renal transplant in 2015 on cyclosporine, mycophenolate and prednisone, HTN, HLD, hypothyroid, hyperparathyroid, HFmEF (45-50%), AFib, PVD, lymphedema, admitted for emphysematous pyelonephritis and acute pancreatitis in the setting of severe hypercalcemia.    #Acute pancreatitis  :: Possibly secondary to hypercalcemia (15.9) vs. medications including  cyclosporine (less likely)  :: Lipase 1039, CT findings supportive, low back pain initial presentation    - Will give 1L LR and start maintenance fluids 150 mL/hr (less calcium)  - Diuresis if patient oxygen requirement increases  - Holding maintenance fluids temporarily and diuresing with Lasix 40 IV, resume fluids as tolerated  - Keep n.p.o.  - Pain control with IV medication  - Hold cyclosporine for now given possible culprit  - Follow-up random cyclosporine levels  - Consult GI to weigh in for discontinuing cyclosporine  - Consult endocrinology for hypercalcemia  - Follow up triglycerides  - Will need to confirm if patient was actually taking cyclosporine at home  - Strict I/O    #Emphysematous pyelonephritis  :: CT A/P with gas in the RLQ transplant kidney collecting system and in the urinary bladder, possible gas-forming infection  :: Suspected ureteral stone appears to be outside of the ureter. No hydronephrosis is seen. Unlikely to be culprit  :: Patient was on Vanco and Zosyn at outside hospital, reported use of meropenem and gentamicin  - Started vancomycin and meropenem 1 g every 8 hours  - Transplant ID consulted for guidance on antibiotic use  - Transplant nephrology aware of patient, consult formally in a.m.  - Follow-up infectious workup: Blood cultures, urine cultures, urinalysis    Urine Cultures:  - 1/12/2022 Enterococcus faecalis, resistant  to gentamicin, intermediate to doxycycline, sensitive to everything else   - 4/2024 Pseudomonas pan-susceptible   - 6/3/2024: Pending     #Hypercalcemia  :: s/p subtotal hemithyroidectomy & parathyroidectomy in 2006   :: Calcium 15.9 at [facility]  :: Potentially the culprit for her presentation of AMS/confusion, pancreatitis, vomiting, large stool burden and constipation, and kidney stones  :: Consider parathyromatosis as possible etiology of this presentation given prior parathyroid surgery  :: Suppressed intact PTH, has a recent history of elevated PTH  with mildly elevated calcium consistent with primary hyperparathyroidism evaluation at that time unremarkable  :: Given severity, was treated at [facility] with [place] calcitonin 4 units/kg subcutaneously and high-volume IV normal saline   - Follow up hypercalcemia evaluation including PTH-RP, SPEP, UPEP, 25-hydroxy vitamin D3, 1, 25 dihydroxy vitamin D3  - Consider sestamibi parathyroid scan/parathyroid ultrasound   - Running IVF for volume resuscitation   - Consult endocrinology in AM   - Bowel regimen     #Respiratory acidosis  ::Likely 2/2 to sepsis, obesity, COPD, possible RML atelectasis on xray  ::ABG: pH 7.31, pCO2 56, pO2 80, So2 97, HCO3 28  ::Patient is compensating for respiratory acidosis  -Continue to monitor with frequent VBG/ABG  -Consider BiPAP if patient has worsening mental status or VBG/ABG  -Consider bronchopulmonary hygiene for atelectasis     #Hypogylcemia  ::Concern for adrenal insufficiency while on steroids   ::Ammonia normal, TSH nl  -Will do D5 NS for maintenance fluids  -Follow up AM cortisol     #LAURA on CKD  :: s/p renal transplant in 2015  :: Follows with Dr Gay at  for renal transplant   :: On mycophenolate, cyclosporine, prednisone  :: Creatinine on admission to [facility] 2.7 (baseline 1.5-1.7)  :: LAURA likely prerenal in setting of pancreatitis and hypovolemia  - Decrease mycophenolate to 500 twice daily for now  - Follow up transplant nephrology recommendations for immunosuppression medications  - Continue prednisone  - Hold cyclosporine for now, follow-up transplant nephrology recommendations  - Urine electrolytes sent  - Follow-up urinalysis  - Continue to monitor creatinine    #Reported nonsustained VT at [facility]  #Hypertension  #HFmrEF  - Start metoprolol tartrate 12.5 QID tartrate  - Confirmed patient's metoprolol dose prior to admission  - Optimize electrolytes K > 4, Mg > 2  - Follow-up ECG  - Admit with telemetry  - Holding Bumex 1 mg for now  -  Confirm if patient has been worked up for ischemic evaluation in the past  - Cautious given the fluids for pancreatitis  - Diurese prn     #Hx AFib  -Rate controlled with beta blocker  -Heparin infusion started given no concern for bleeding    #Hx of DVT LLE 2022  ::On Eliquis  -Heparin infusion started given no concern for bleeding    #COPD  - Continue inhaled treatments    #Hypothyroidism  - Continue levothyroxine    #HLD  - Holding atorvastatin for now    #Bipolar discorder  -Hold home Desvenlafaxine and Clozapine given AMS     #Miscellaneous   ::Asymptomatic HIV status. Hx hepatitis B and hepatitis C  - Follow up HIV test    N: NPO  A: PIV    DVT ppx: Lovenox  GI ppx: PPI IV on steroids    Code Status: Assumed full code   Surrogate Medical Decision-maker: son [phone]    Charan Freire MD

## 2024-06-04 NOTE — SIGNIFICANT EVENT
Rapid/RADAR - AMS    Pt arrived from OSH with decreased interaction with staff and decreased values on VBG/glucose.    Pt confused but interacting on arrival. ABG drawn from left radial artery. Compensated (Pt with COPD history)    Glucose low - given D-50/being treated for infection    On 4L NC - will monitor for need of BiPap and move to unit for acidosis and confusion

## 2024-06-04 NOTE — SIGNIFICANT EVENT
Rapid Response    Rapid Response called for decreased loc.    RT sent ABG and placed on BiPAP prior to rapid response arrival.    VS checked.  Patient minimally responsive to voice.  1 amp D50 IVP given for hypoglycemia.   Provider at bedside and plan to transfer to ICU.  EKG completed.      Report given and patient tolerated transfer to ICU.

## 2024-06-05 ENCOUNTER — APPOINTMENT (OUTPATIENT)
Dept: CARDIOLOGY | Facility: HOSPITAL | Age: 69
End: 2024-06-05
Payer: MEDICARE

## 2024-06-05 LAB
ALBUMIN MFR UR ELPH: 18.2 %
ALBUMIN SERPL BCP-MCNC: 2.3 G/DL (ref 3.4–5)
ALBUMIN SERPL-MCNC: 2.4 G/DL (ref 3.5–4.7)
ALP SERPL-CCNC: 99 U/L (ref 33–136)
ALPHA1 GLOB MFR UR ELPH: 15.1 %
ALPHA1 GLOB SERPL ELPH-MCNC: 0.4 G/DL (ref 0.2–0.4)
ALPHA2 GLOB MFR UR ELPH: 20.5 %
ALPHA2 GLOB SERPL ELPH-MCNC: 0.7 G/DL (ref 0.5–1)
ALT SERPL W P-5'-P-CCNC: 11 U/L (ref 7–45)
ANION GAP SERPL CALC-SCNC: 14 MMOL/L (ref 10–20)
APPEARANCE UR: ABNORMAL
AST SERPL W P-5'-P-CCNC: 34 U/L (ref 9–39)
B-GLOBULIN MFR UR ELPH: 19.5 %
B-GLOBULIN SERPL ELPH-MCNC: 0.6 G/DL (ref 0.8–1.3)
BASOPHILS # BLD AUTO: 0.02 X10*3/UL (ref 0–0.1)
BASOPHILS NFR BLD AUTO: 0.1 %
BILIRUB SERPL-MCNC: 1.3 MG/DL (ref 0–1.2)
BILIRUB UR STRIP.AUTO-MCNC: NEGATIVE MG/DL
BUN SERPL-MCNC: 24 MG/DL (ref 6–23)
CALCIUM SERPL-MCNC: 8.5 MG/DL (ref 8.6–10.6)
CHLORIDE SERPL-SCNC: 109 MMOL/L (ref 98–107)
CO2 SERPL-SCNC: 25 MMOL/L (ref 21–32)
COLOR UR: YELLOW
CREAT SERPL-MCNC: 1.45 MG/DL (ref 0.5–1.05)
CYCLOSPORINE BLD IA-MCNC: 109 NG/ML (ref 80–210)
CYCLOSPORINE BLD-MCNC: 211 UG/L (ref 50–300)
CYCLOSPORINE BLD-MCNC: 211 UG/L (ref 50–300)
EGFRCR SERPLBLD CKD-EPI 2021: 39 ML/MIN/1.73M*2
EOSINOPHIL # BLD AUTO: 0.08 X10*3/UL (ref 0–0.7)
EOSINOPHIL NFR BLD AUTO: 0.6 %
ERYTHROCYTE [DISTWIDTH] IN BLOOD BY AUTOMATED COUNT: 16.7 % (ref 11.5–14.5)
GAMMA GLOB MFR UR ELPH: 26.7 %
GAMMA GLOB SERPL ELPH-MCNC: 0.5 G/DL (ref 0.7–1.6)
GLUCOSE BLD MANUAL STRIP-MCNC: 111 MG/DL (ref 74–99)
GLUCOSE BLD MANUAL STRIP-MCNC: 72 MG/DL (ref 74–99)
GLUCOSE BLD MANUAL STRIP-MCNC: 76 MG/DL (ref 74–99)
GLUCOSE BLD MANUAL STRIP-MCNC: 77 MG/DL (ref 74–99)
GLUCOSE BLD MANUAL STRIP-MCNC: 78 MG/DL (ref 74–99)
GLUCOSE BLD MANUAL STRIP-MCNC: 82 MG/DL (ref 74–99)
GLUCOSE BLD MANUAL STRIP-MCNC: 90 MG/DL (ref 74–99)
GLUCOSE SERPL-MCNC: 67 MG/DL (ref 74–99)
GLUCOSE UR STRIP.AUTO-MCNC: NORMAL MG/DL
HCT VFR BLD AUTO: 35.9 % (ref 36–46)
HGB BLD-MCNC: 11 G/DL (ref 12–16)
IMM GRANULOCYTES # BLD AUTO: 0.27 X10*3/UL (ref 0–0.7)
IMM GRANULOCYTES NFR BLD AUTO: 1.9 % (ref 0–0.9)
IMMUNOFIXATION COMMENT: NORMAL
INTERPRETATION SERPL IFE-IMP: NORMAL
KETONES UR STRIP.AUTO-MCNC: NEGATIVE MG/DL
LEUKOCYTE ESTERASE UR QL STRIP.AUTO: ABNORMAL
LYMPHOCYTES # BLD AUTO: 0.42 X10*3/UL (ref 1.2–4.8)
LYMPHOCYTES NFR BLD AUTO: 2.9 %
MAGNESIUM SERPL-MCNC: 1.96 MG/DL (ref 1.6–2.4)
MCH RBC QN AUTO: 35.3 PG (ref 26–34)
MCHC RBC AUTO-ENTMCNC: 30.6 G/DL (ref 32–36)
MCV RBC AUTO: 115 FL (ref 80–100)
MONOCYTES # BLD AUTO: 0.93 X10*3/UL (ref 0.1–1)
MONOCYTES NFR BLD AUTO: 6.4 %
MUCOUS THREADS #/AREA URNS AUTO: ABNORMAL /LPF
NEUTROPHILS # BLD AUTO: 12.74 X10*3/UL (ref 1.2–7.7)
NEUTROPHILS NFR BLD AUTO: 88.1 %
NITRITE UR QL STRIP.AUTO: NEGATIVE
NRBC BLD-RTO: 0.1 /100 WBCS (ref 0–0)
PATH REV: NORMAL
PATH REVIEW - URINE IMMUNOFIXATION: NORMAL
PATH REVIEW-URINE PROTEIN ELECTROPHORESIS: NORMAL
PATHOLOGIST: ABNORMAL
PH UR STRIP.AUTO: 6 [PH]
PLATELET # BLD AUTO: 115 X10*3/UL (ref 150–450)
POTASSIUM SERPL-SCNC: 4.3 MMOL/L (ref 3.5–5.3)
PROT PATTERN SERPL ELPH-IMP: ABNORMAL
PROT SERPL-MCNC: 4.1 G/DL (ref 6.4–8.2)
PROT SERPL-MCNC: 4.7 G/DL (ref 6.4–8.3)
PROT UR STRIP.AUTO-MCNC: ABNORMAL MG/DL
RBC # BLD AUTO: 3.12 X10*6/UL (ref 4–5.2)
RBC # UR STRIP.AUTO: ABNORMAL /UL
RBC #/AREA URNS AUTO: ABNORMAL /HPF
SODIUM SERPL-SCNC: 144 MMOL/L (ref 136–145)
SP GR UR STRIP.AUTO: 1.04
UFH PPP CHRO-ACNC: 1.4 IU/ML
URINE ELECTROPHORESIS COMMENT: NORMAL
UROBILINOGEN UR STRIP.AUTO-MCNC: ABNORMAL MG/DL
VANCOMYCIN TROUGH SERPL-MCNC: 21.9 UG/ML (ref 5–20)
WBC # BLD AUTO: 14.5 X10*3/UL (ref 4.4–11.3)
WBC #/AREA URNS AUTO: ABNORMAL /HPF

## 2024-06-05 PROCEDURE — 99231 SBSQ HOSP IP/OBS SF/LOW 25: CPT | Performed by: STUDENT IN AN ORGANIZED HEALTH CARE EDUCATION/TRAINING PROGRAM

## 2024-06-05 PROCEDURE — 94640 AIRWAY INHALATION TREATMENT: CPT | Mod: MUE

## 2024-06-05 PROCEDURE — 94003 VENT MGMT INPAT SUBQ DAY: CPT

## 2024-06-05 PROCEDURE — 2500000002 HC RX 250 W HCPCS SELF ADMINISTERED DRUGS (ALT 637 FOR MEDICARE OP, ALT 636 FOR OP/ED): Performed by: STUDENT IN AN ORGANIZED HEALTH CARE EDUCATION/TRAINING PROGRAM

## 2024-06-05 PROCEDURE — 85520 HEPARIN ASSAY: CPT

## 2024-06-05 PROCEDURE — 82947 ASSAY GLUCOSE BLOOD QUANT: CPT | Mod: 91

## 2024-06-05 PROCEDURE — 85027 COMPLETE CBC AUTOMATED: CPT

## 2024-06-05 PROCEDURE — 36415 COLL VENOUS BLD VENIPUNCTURE: CPT | Performed by: STUDENT IN AN ORGANIZED HEALTH CARE EDUCATION/TRAINING PROGRAM

## 2024-06-05 PROCEDURE — 2500000004 HC RX 250 GENERAL PHARMACY W/ HCPCS (ALT 636 FOR OP/ED): Performed by: STUDENT IN AN ORGANIZED HEALTH CARE EDUCATION/TRAINING PROGRAM

## 2024-06-05 PROCEDURE — 36415 COLL VENOUS BLD VENIPUNCTURE: CPT

## 2024-06-05 PROCEDURE — 1200000002 HC GENERAL ROOM WITH TELEMETRY DAILY

## 2024-06-05 PROCEDURE — 2500000004 HC RX 250 GENERAL PHARMACY W/ HCPCS (ALT 636 FOR OP/ED)

## 2024-06-05 PROCEDURE — 80053 COMPREHEN METABOLIC PANEL: CPT

## 2024-06-05 PROCEDURE — 2500000005 HC RX 250 GENERAL PHARMACY W/O HCPCS: Performed by: STUDENT IN AN ORGANIZED HEALTH CARE EDUCATION/TRAINING PROGRAM

## 2024-06-05 PROCEDURE — 81001 URINALYSIS AUTO W/SCOPE: CPT

## 2024-06-05 PROCEDURE — 83735 ASSAY OF MAGNESIUM: CPT

## 2024-06-05 PROCEDURE — 99232 SBSQ HOSP IP/OBS MODERATE 35: CPT | Performed by: PSYCHIATRY & NEUROLOGY

## 2024-06-05 PROCEDURE — 99232 SBSQ HOSP IP/OBS MODERATE 35: CPT | Performed by: INTERNAL MEDICINE

## 2024-06-05 PROCEDURE — 80158 DRUG ASSAY CYCLOSPORINE: CPT | Performed by: STUDENT IN AN ORGANIZED HEALTH CARE EDUCATION/TRAINING PROGRAM

## 2024-06-05 PROCEDURE — 2500000002 HC RX 250 W HCPCS SELF ADMINISTERED DRUGS (ALT 637 FOR MEDICARE OP, ALT 636 FOR OP/ED)

## 2024-06-05 PROCEDURE — 2500000001 HC RX 250 WO HCPCS SELF ADMINISTERED DRUGS (ALT 637 FOR MEDICARE OP)

## 2024-06-05 PROCEDURE — 93005 ELECTROCARDIOGRAM TRACING: CPT

## 2024-06-05 PROCEDURE — 85025 COMPLETE CBC W/AUTO DIFF WBC: CPT

## 2024-06-05 PROCEDURE — 87086 URINE CULTURE/COLONY COUNT: CPT

## 2024-06-05 RX ORDER — IPRATROPIUM BROMIDE AND ALBUTEROL SULFATE 2.5; .5 MG/3ML; MG/3ML
3 SOLUTION RESPIRATORY (INHALATION) EVERY 6 HOURS PRN
Status: DISCONTINUED | OUTPATIENT
Start: 2024-06-05 | End: 2024-06-06

## 2024-06-05 RX ORDER — VANCOMYCIN HYDROCHLORIDE 500 MG/100ML
500 INJECTION, SOLUTION INTRAVENOUS ONCE
Status: COMPLETED | OUTPATIENT
Start: 2024-06-05 | End: 2024-06-05

## 2024-06-05 RX ORDER — METOPROLOL TARTRATE 25 MG/1
12.5 TABLET, FILM COATED ORAL ONCE
Status: DISCONTINUED | OUTPATIENT
Start: 2024-06-05 | End: 2024-06-05

## 2024-06-05 RX ADMIN — CYCLOSPORINE 100 MG: 100 SOLUTION ORAL at 18:31

## 2024-06-05 RX ADMIN — Medication 40 PERCENT: at 10:00

## 2024-06-05 RX ADMIN — MYCOPHENOLATE MOFETIL 500 MG: 200 POWDER, FOR SUSPENSION ORAL at 11:11

## 2024-06-05 RX ADMIN — MEROPENEM 1 G: 1 INJECTION, POWDER, FOR SOLUTION INTRAVENOUS at 23:26

## 2024-06-05 RX ADMIN — MEROPENEM 1 G: 1 INJECTION, POWDER, FOR SOLUTION INTRAVENOUS at 11:45

## 2024-06-05 RX ADMIN — ACYCLOVIR SODIUM 500 MG: 50 INJECTION, SOLUTION INTRAVENOUS at 06:04

## 2024-06-05 RX ADMIN — PREDNISONE 5 MG: 5 TABLET ORAL at 08:39

## 2024-06-05 RX ADMIN — CYCLOSPORINE 100 MG: 100 SOLUTION ORAL at 06:04

## 2024-06-05 RX ADMIN — IPRATROPIUM BROMIDE AND ALBUTEROL SULFATE 3 ML: .5; 3 SOLUTION RESPIRATORY (INHALATION) at 03:44

## 2024-06-05 RX ADMIN — POLYETHYLENE GLYCOL 3350 17 G: 17 POWDER, FOR SOLUTION ORAL at 20:20

## 2024-06-05 RX ADMIN — SENNOSIDES AND DOCUSATE SODIUM 2 TABLET: 50; 8.6 TABLET ORAL at 08:39

## 2024-06-05 RX ADMIN — SIMVASTATIN 40 MG: 40 TABLET, FILM COATED ORAL at 20:21

## 2024-06-05 RX ADMIN — Medication 40 PERCENT: at 08:00

## 2024-06-05 RX ADMIN — POLYETHYLENE GLYCOL 3350 17 G: 17 POWDER, FOR SOLUTION ORAL at 08:39

## 2024-06-05 RX ADMIN — IPRATROPIUM BROMIDE AND ALBUTEROL SULFATE 3 ML: .5; 3 SOLUTION RESPIRATORY (INHALATION) at 07:08

## 2024-06-05 RX ADMIN — MYCOPHENOLATE MOFETIL 500 MG: 200 POWDER, FOR SUSPENSION ORAL at 20:21

## 2024-06-05 RX ADMIN — LEVOTHYROXINE SODIUM 50 MCG: 0.05 TABLET ORAL at 06:04

## 2024-06-05 RX ADMIN — VANCOMYCIN HYDROCHLORIDE 500 MG: 500 INJECTION, SOLUTION INTRAVENOUS at 07:35

## 2024-06-05 RX ADMIN — SENNOSIDES AND DOCUSATE SODIUM 2 TABLET: 50; 8.6 TABLET ORAL at 20:21

## 2024-06-05 ASSESSMENT — PAIN - FUNCTIONAL ASSESSMENT
PAIN_FUNCTIONAL_ASSESSMENT: CPOT (CRITICAL CARE PAIN OBSERVATION TOOL)
PAIN_FUNCTIONAL_ASSESSMENT: 0-10
PAIN_FUNCTIONAL_ASSESSMENT: 0-10

## 2024-06-05 ASSESSMENT — PAIN SCALES - GENERAL
PAINLEVEL_OUTOF10: 0 - NO PAIN

## 2024-06-05 NOTE — PROGRESS NOTES
Lea Paulino   69 y.o.    @WT@  MRN/Room: [id number]/25/25-A    Subjective: intubated yesterday due to increased drowsiness and inability to protect AW. ON minimal sedation this AM and able to follow commands  Denies taking OTC calcium supplements     Objective:     Meds:   cycloSPORINE, 100 mg, q12h MARTIN  fentaNYL, 25 mcg, Once  fentaNYL PF, 100 mcg, Once  levothyroxine, 50 mcg, q AM  meropenem, 1 g, q12h  midazolam, 4 mg, Once  mycophenolate, 500 mg, BID  polyethylene glycol, 17 g, BID  predniSONE, 5 mg, Daily  sennosides-docusate sodium, 2 tablet, BID  simvastatin, 40 mg, Nightly      dexmedeTOMIDine, Last Rate: Stopped (06/05/24 0000)  fentaNYL, Last Rate: Stopped (06/05/24 0909)  [Held by provider] heparin, Last Rate: Stopped (06/04/24 1852)      dextrose, 12.5 g, q15 min PRN  dextrose, 12.5 g, q15 min PRN  dextrose, 25 g, q15 min PRN  dextrose, 25 g, q15 min PRN  fentaNYL, 25 mcg, q10 min PRN  glucagon, 1 mg, q15 min PRN  glucagon, 1 mg, q15 min PRN  glucagon, 1 mg, q15 min PRN  glucagon, 1 mg, q15 min PRN  heparin, 3,000-6,000 Units, q4h PRN  ipratropium-albuteroL, 3 mL, q6h PRN  vancomycin, , Daily PRN        Vitals:    06/05/24 1400   BP: 129/63   Pulse: (!) 113   Resp: 21   Temp:    SpO2: 94%          Intake/Output Summary (Last 24 hours) at 6/5/2024 1458  Last data filed at 6/5/2024 1300  Gross per 24 hour   Intake 2316.11 ml   Output 670 ml   Net 1646.11 ml       General appearance: no distress, intubated   Eyes: non-icteric  Skin: no apparent rash  Heart: regular  Lungs: CTA bilat no wheezing/crackles  Abdomen: soft, nt/nd  Extremities: no edema bilat  Dominique +  Neuro: No FND,asterixis  Access    Blood Labs:  Results for orders placed or performed during the hospital encounter of 06/03/24 (from the past 24 hour(s))   POCT GLUCOSE   Result Value Ref Range    POCT Glucose 69 (L) 74 - 99 mg/dL   Transthoracic Echo (TTE) Complete   Result Value Ref Range    AV pk ashlee 2.03 m/s    AV mn grad 8.0 mmHg     MV avg E/e' ratio 12.95     LA vol index A/L 33.3 ml/m2    RV free wall pk S' 18.50 cm/s    AV pk grad 16.5 mmHg   BLOOD GAS VENOUS FULL PANEL   Result Value Ref Range    POCT pH, Venous 7.35 7.33 - 7.43 pH    POCT pCO2, Venous 52 (H) 41 - 51 mm Hg    POCT pO2, Venous 28 (L) 35 - 45 mm Hg    POCT SO2, Venous 51 45 - 75 %    POCT Oxy Hemoglobin, Venous 49.8 45.0 - 75.0 %    POCT Hematocrit Calculated, Venous 39.0 36.0 - 46.0 %    POCT Sodium, Venous 137 136 - 145 mmol/L    POCT Potassium, Venous 4.5 3.5 - 5.3 mmol/L    POCT Chloride, Venous 106 98 - 107 mmol/L    POCT Ionized Calicum, Venous 1.30 1.10 - 1.33 mmol/L    POCT Glucose, Venous 98 74 - 99 mg/dL    POCT Lactate, Venous 1.3 0.4 - 2.0 mmol/L    POCT Base Excess, Venous 2.1 -2.0 - 3.0 mmol/L    POCT HCO3 Calculated, Venous 28.7 (H) 22.0 - 26.0 mmol/L    POCT Hemoglobin, Venous 12.9 12.0 - 16.0 g/dL    POCT Anion Gap, Venous 7.0 (L) 10.0 - 25.0 mmol/L    Patient Temperature 37.0 degrees Celsius    FiO2 40 %   POCT GLUCOSE   Result Value Ref Range    POCT Glucose 97 74 - 99 mg/dL   Heparin Assay, UFH   Result Value Ref Range    Heparin Unfractionated 1.6 (HH) See Comment Below for Therapeutic Ranges IU/mL   Ammonia   Result Value Ref Range    Ammonia 34 16 - 53 umol/L   POCT GLUCOSE   Result Value Ref Range    POCT Glucose 73 (L) 74 - 99 mg/dL   POCT GLUCOSE   Result Value Ref Range    POCT Glucose 62 (L) 74 - 99 mg/dL   POCT GLUCOSE   Result Value Ref Range    POCT Glucose 102 (H) 74 - 99 mg/dL   Vancomycin, Trough   Result Value Ref Range    Vancomycin, Trough 21.9 (HH) 5.0 - 20.0 ug/mL   POCT GLUCOSE   Result Value Ref Range    POCT Glucose 91 74 - 99 mg/dL   Comprehensive metabolic panel   Result Value Ref Range    Glucose 67 (L) 74 - 99 mg/dL    Sodium 144 136 - 145 mmol/L    Potassium 4.3 3.5 - 5.3 mmol/L    Chloride 109 (H) 98 - 107 mmol/L    Bicarbonate 25 21 - 32 mmol/L    Anion Gap 14 10 - 20 mmol/L    Urea Nitrogen 24 (H) 6 - 23 mg/dL    Creatinine  1.45 (H) 0.50 - 1.05 mg/dL    eGFR 39 (L) >60 mL/min/1.73m*2    Calcium 8.5 (L) 8.6 - 10.6 mg/dL    Albumin 2.3 (L) 3.4 - 5.0 g/dL    Alkaline Phosphatase 99 33 - 136 U/L    Total Protein 4.1 (L) 6.4 - 8.2 g/dL    AST 34 9 - 39 U/L    Bilirubin, Total 1.3 (H) 0.0 - 1.2 mg/dL    ALT 11 7 - 45 U/L   Magnesium   Result Value Ref Range    Magnesium 1.96 1.60 - 2.40 mg/dL   POCT GLUCOSE   Result Value Ref Range    POCT Glucose 72 (L) 74 - 99 mg/dL   Cyclosporine level   Result Value Ref Range    Cyclosporine 109 80 - 210 ng/mL   POCT GLUCOSE   Result Value Ref Range    POCT Glucose 78 74 - 99 mg/dL   POCT GLUCOSE   Result Value Ref Range    POCT Glucose 111 (H) 74 - 99 mg/dL   Heparin Assay   Result Value Ref Range    Heparin Unfractionated 1.4 (HH) See Comment Below for Therapeutic Ranges IU/mL   CBC and Auto Differential   Result Value Ref Range    WBC 14.5 (H) 4.4 - 11.3 x10*3/uL    nRBC 0.1 (H) 0.0 - 0.0 /100 WBCs    RBC 3.12 (L) 4.00 - 5.20 x10*6/uL    Hemoglobin 11.0 (L) 12.0 - 16.0 g/dL    Hematocrit 35.9 (L) 36.0 - 46.0 %     (H) 80 - 100 fL    MCH 35.3 (H) 26.0 - 34.0 pg    MCHC 30.6 (L) 32.0 - 36.0 g/dL    RDW 16.7 (H) 11.5 - 14.5 %    Platelets 115 (L) 150 - 450 x10*3/uL    Neutrophils % 88.1 40.0 - 80.0 %    Immature Granulocytes %, Automated 1.9 (H) 0.0 - 0.9 %    Lymphocytes % 2.9 13.0 - 44.0 %    Monocytes % 6.4 2.0 - 10.0 %    Eosinophils % 0.6 0.0 - 6.0 %    Basophils % 0.1 0.0 - 2.0 %    Neutrophils Absolute 12.74 (H) 1.20 - 7.70 x10*3/uL    Immature Granulocytes Absolute, Automated 0.27 0.00 - 0.70 x10*3/uL    Lymphocytes Absolute 0.42 (L) 1.20 - 4.80 x10*3/uL    Monocytes Absolute 0.93 0.10 - 1.00 x10*3/uL    Eosinophils Absolute 0.08 0.00 - 0.70 x10*3/uL    Basophils Absolute 0.02 0.00 - 0.10 x10*3/uL   POCT GLUCOSE   Result Value Ref Range    POCT Glucose 90 74 - 99 mg/dL              ASSESSMENT:  Lea Paulino is a 69 y.o.  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 On admission, labs notable for hypercalcemia and LAUAR for which transplant nephrology is following.      #Allograft function  Date of transplant: 10/30/2015  Baseline Cr: 1.5-1.7, admitted with a Cr of 2.7, today Cr 1.45   UA: 1+ protein, 2+ blood, > 50 WBC's and 11-20 RBC's  Imaging: CT A/P without contrast - RLQ transplanted kidney with a tiny non obstructive stone. Other findings notable for inflammation of the pancreatic head. 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 Now only remains on meropenem  - Ucx negative. However, at OSH Ucx +ve for GN rods + mixed edison    - Recommend EBV, CMV and BK viral loads   - avoid nephrotoxins, IV contrast and NSAIDs  - renally dose medications to CrCl      #Immunosuppression  - continue cyclosporine 100 BID  - continue  BID (home dose 1 g BID)  - continue prednisone 5 mg daily   - recommend cyclosporine levels     #Hemodynamics   - BP's reviewed and remain stable   - continue to monitor      #Electrolytes - hypercalcemia  - admission Ca 15.4, albumin 3.8, no phos levels  - iPTH appropriately low on admission, repeat 53.6  - Vitamin D 25 levels WNL, pending PTHrp and Vitamin D-1,25 OH   - daily ical, phos and mag levels   - hold home calcitriol, s/p calcitonin x 1 dose at OSH  - overall improved      #CKD-Anemia   - Hgb 10.9, will monitor     #Acid-base   - primary respiratory acidosis, management  as per micu-pulm team      Will follow          Suyapa Duran DO  Nephrology Fellow   Daytime / Weekend Renal Pager [phone]  After 7 pm Emergencies [phone] Pager [phone]

## 2024-06-05 NOTE — PROGRESS NOTES
Speech-Language Pathology  Therapy Communication Note  Patient Name: Lea Paulino  MRN: [id number]  Today's Date: 6/5/2024   Time: 800    Discipline: Speech-Language Pathology    Reason: Attempt    Comment:  Per EMR, pt now intubated s/p respiratory distress. Not appropriate for clinical swallow evaluation; SLP to sign off/complete order. Please re-consult upon extubation.

## 2024-06-05 NOTE — CARE PLAN
Problem: Pain  Goal: My pain/discomfort is manageable  Outcome: Progressing     Problem: Safety  Goal: Patient will be injury free during hospitalization  Outcome: Progressing  Goal: I will remain free of falls  Outcome: Progressing     Problem: Daily Care  Goal: Daily care needs are met  Outcome: Progressing     Problem: Skin  Goal: Participates in plan/prevention/treatment measures  Outcome: Progressing  Flowsheets (Taken 6/5/2024 0850)  Participates in plan/prevention/treatment measures:   Discuss with provider PT/OT consult   Elevate heels  Goal: Prevent/manage excess moisture  Outcome: Progressing  Flowsheets (Taken 6/5/2024 0850)  Prevent/manage excess moisture:   Cleanse incontinence/protect with barrier cream   Moisturize dry skin   Monitor for/manage infection if present  Goal: Prevent/minimize sheer/friction injuries  Outcome: Progressing  Flowsheets (Taken 6/5/2024 0850)  Prevent/minimize sheer/friction injuries:   Complete micro-shifts as needed if patient unable. Adjust patient position to relieve pressure points, not a full turn   HOB 30 degrees or less   Turn/reposition every 2 hours/use positioning/transfer devices  Goal: Promote/optimize nutrition  Outcome: Progressing  Flowsheets (Taken 6/5/2024 0850)  Promote/optimize nutrition: Discuss with provider if NPO > 2 days  Goal: Promote skin healing  Outcome: Progressing  Flowsheets (Taken 6/5/2024 0850)  Promote skin healing:   Protective dressings over bony prominences   Turn/reposition every 2 hours/use positioning/transfer devices       The clinical goals for the shift include pt will remain hemodynamically stable through shift

## 2024-06-05 NOTE — PROGRESS NOTES
Critical Care Medicine History and Physical        Subjective   No new OVN events    HPI:  Lea Paulino is a 69 y.o. female with a history of COPD, renal transplant in 2015 on cyclosporine, mycophenolate and prednisone, HTN, HLD, hypothyroid, hyperparathyroid, HFmEF (45-50%), AFib, PVD, lymphedema, 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, presenting to the MICU in acute respiratory distress placed on BiPAP and later intubated due to expected clinical course.     Past Medical History:   Diagnosis Date    Asymptomatic human immunodeficiency virus (hiv) infection status (Multi) 04/12/2016    HIV, asymptomatic    Kidney transplant status (HHS-HCC)     Status post kidney transplant    Personal history of other infectious and parasitic diseases 04/12/2016    History of hepatitis B virus infection    Personal history of other infectious and parasitic diseases 04/12/2016    History of hepatitis C virus infection    Postprocedural hypoparathyroidism (Multi)     Status post subtotal parathyroidectomy    Postprocedural hypothyroidism     Status post partial thyroidectomy    Urinary tract infection, site not specified 04/08/2016    Acute UTI     Past Surgical History:   Procedure Laterality Date    OTHER SURGICAL HISTORY  05/05/2016    Renal Transplant     Medications Prior to Admission   Medication Sig Dispense Refill Last Dose    apixaban (Eliquis) 5 mg tablet Take 1 tablet (5 mg) by mouth 2 times a day.   6/3/2024    bumetanide (Bumex) 1 mg tablet Take 1 tablet (1 mg) by mouth once daily.   Past Week    calcitriol (Rocaltrol) 0.5 mcg capsule Take 1 capsule (0.5 mcg) by mouth 3 times a week. Mon, Wed, Fri   Past Week    cloZAPine (Clozaril) 50 mg tablet Take 1 tablet (50 mg) by mouth once daily at bedtime.   Past Week    cycloSPORINE (SandIMMUNE) 100 mg capsule Take 1 capsule (100 mg) by mouth 2 times a day.   6/3/2024    desvenlafaxine 100 mg 24 hr tablet Take 1 tablet (100 mg) by  mouth once daily.   Past Week    ferrous sulfate, 325 mg ferrous sulfate, tablet Take 1 tablet by mouth once daily with breakfast.   6/3/2024    folic acid (Folvite) 1 mg tablet Take 1 tablet (1 mg) by mouth once daily.   6/3/2024    levothyroxine (Synthroid, Levoxyl) 50 mcg tablet Take 1 tablet (50 mcg) by mouth once daily in the morning.   6/3/2024    metoprolol tartrate (Lopressor) 25 mg tablet Take 0.5 tablets (12.5 mg) by mouth 2 times a day.   6/3/2024    mycophenolate (Cellcept) 500 mg tablet Take 2 tablets (1,000 mg) by mouth 2 times a day.   6/3/2024    oxyCODONE-acetaminophen (Percocet) 5-325 mg tablet Take 1 tablet by mouth 4 times a day as needed.   Past Week    predniSONE (Deltasone) 5 mg tablet Take 1 tablet (5 mg) by mouth once daily.   6/3/2024    simvastatin (Zocor) 40 mg tablet Take 1 tablet (40 mg) by mouth once daily at bedtime.   Past Week    vancomycin (Vancocin) 125 mg capsule Take 1 capsule (125 mg) by mouth 3 times a week. Mon, Wed, Fri   Past Week    acetaminophen (Tylenol) 325 mg tablet Take 2 tablets (650 mg) by mouth every 6 hours if needed.   Unknown    acyclovir (Zovirax) 400 mg tablet Take 1 tablet (400 mg) by mouth 2 times a day.   Unknown    allopurinol (Zyloprim) 100 mg tablet Take 1 tablet (100 mg) by mouth once daily.   Unknown    cholecalciferol (Vitamin D3) 25 MCG (1000 UT) tablet Take 1 tablet (1,000 Units) by mouth once daily.   Unknown    docusate sodium (Colace) 100 mg capsule Take 1 capsule (100 mg) by mouth as needed at bedtime for constipation.   Unknown     Macrodantin [nitrofurantoin macrocrystal] and Sulfa (sulfonamide antibiotics)  Social History     Tobacco Use    Smoking status: Unknown   Vaping Use    Vaping status: Every Day     No family history on file.    Scheduled Medications:   acyclovir, 10 mg/kg (Ideal), intravenous, q12h  cycloSPORINE, 100 mg, oral, q12h MARTIN  fentaNYL, 25 mcg, intravenous, Once  fentaNYL PF, 100 mcg, intravenous,  "Once  ipratropium-albuteroL, 3 mL, nebulization, q6h  levothyroxine, 50 mcg, oral, q AM  meropenem, 1 g, intravenous, q12h  midazolam, 4 mg, intravenous, Once  mycophenolate, 500 mg, orogastric tube, BID  oxygen, , inhalation, Continuous - Inhalation  polyethylene glycol, 17 g, oral, BID  predniSONE, 5 mg, oral, Daily  sennosides-docusate sodium, 2 tablet, oral, BID  simvastatin, 40 mg, oral, Nightly         Continuous Medications:   dexmedeTOMIDine, 0.2-1.5 mcg/kg/hr, Last Rate: Stopped (06/05/24 0000)  fentaNYL, 0-300 mcg/hr, Last Rate: 25 mcg/hr (06/04/24 2110)  [Held by provider] heparin, 0-4,500 Units/hr, Last Rate: Stopped (06/04/24 1852)         PRN Medications:   PRN medications: dextrose, dextrose, dextrose, dextrose, fentaNYL, glucagon, glucagon, glucagon, glucagon, heparin, vancomycin      Objective   Vitals:  Most Recent:  Heart Rate:  []   Temp:  [36 °C (96.8 °F)-37.1 °C (98.8 °F)]   Resp:  [14-32]   BP: ()/(46-93)   Height:  [157.5 cm (5' 2.01\")]   Weight:  [95.4 kg (210 lb 5.1 oz)-96.9 kg (213 lb 10 oz)]   SpO2:  [94 %-100 %]      24hr Min/Max:  Temp  Min: 36 °C (96.8 °F)  Max: 37.1 °C (98.8 °F)  Pulse  Min: 75  Max: 120  BP  Min: 79/56  Max: 148/85  Resp  Min: 14  Max: 32  SpO2  Min: 94 %  Max: 100 %    LDA:   ETT  7.5 mm (Active)   Placement Date/Time: 06/04/24 1354   Mask Ventilation: Vent by mask  Technique: Video laryngoscopy  ETT Type: ETT - single  Single Lumen Tube Size: 7.5 mm  Cuffed: Yes  Location: Oral  Airway Insertion Attempts: 1  Placement Verification: Auscultation...   Number of days: 0       Urethral Catheter 16 Fr. (Active)   Placement Date/Time: 06/04/24 1215   Tube Size (Fr.): 16 Fr.  Urine Returned: Yes   Number of days: 0       NG/OG/Feeding Tube OG - Warren sump (Active)   Placement Date/Time: 06/04/24 2000   Type of Tube: NG/OG Tube  Tube Length: 55 cm  Tube Type: OG - Warren sump   Number of days: 0         Vent settings:  Vent Mode: Volume control/assist " control  FiO2 (%):  [40 %] 40 %  S RR:  [16] 16  S VT:  [400 mL-450 mL] 400 mL  PEEP/CPAP (cm H2O):  [5 cm H20] 5 cm H20  MAP (cm H2O):  [8.9-10] 9    Hemodynamic parameters for last 24 hours:       I/O last 3 completed shifts:  In: 4364 (45.7 mL/kg) [I.V.:1124 (11.8 mL/kg); NG/GT:180; IV Piggyback:3060]  Out: 2425 (25.4 mL/kg) [Urine:2425 (0.7 mL/kg/hr)]  Weight: 95.4 kg          Physical exam:    Physical Exam  Cardiovascular:      Rate and Rhythm: Regular rhythm. Tachycardia present.      Heart sounds: Normal heart sounds.   Pulmonary:      Breath sounds: Normal breath sounds.      Comments: Intubated   FiO2 40%  RR 16    PEEP 5  Abdominal:      General: Abdomen is flat.      Palpations: Abdomen is soft.   Skin:     General: Skin is warm.   Neurological:      Comments: Off sedation          Lab/Radiology/Diagnostic Review:  Results for orders placed or performed during the hospital encounter of 06/03/24 (from the past 24 hour(s))   Heparin Assay, UFH   Result Value Ref Range    Heparin Unfractionated >2.0 (HH) See Comment Below for Therapeutic Ranges IU/mL   Coagulation Screen   Result Value Ref Range    Protime 16.6 (H) 9.8 - 12.8 seconds    INR 1.5 (H) 0.9 - 1.1    aPTT 49 (H) 27 - 38 seconds   Blood Gas Arterial Full Panel Unsolicited   Result Value Ref Range    POCT pH, Arterial 7.26 (L) 7.38 - 7.42 pH    POCT pCO2, Arterial 63 (H) 38 - 42 mm Hg    POCT pO2, Arterial 82 (L) 85 - 95 mm Hg    POCT SO2, Arterial 98 94 - 100 %    POCT Oxy Hemoglobin, Arterial 94.9 94.0 - 98.0 %    POCT Hematocrit Calculated, Arterial 40.0 36.0 - 46.0 %    POCT Sodium, Arterial 137 136 - 145 mmol/L    POCT Potassium, Arterial 3.7 3.5 - 5.3 mmol/L    POCT Chloride, Arterial 108 (H) 98 - 107 mmol/L    POCT Ionized Calcium, Arterial 1.33 1.10 - 1.33 mmol/L    POCT Glucose, Arterial 70 (L) 74 - 99 mg/dL    POCT Lactate, Arterial 0.5 0.4 - 2.0 mmol/L    POCT Base Excess, Arterial -0.2 -2.0 - 3.0 mmol/L    POCT HCO3 Calculated,  Arterial 28.3 (H) 22.0 - 26.0 mmol/L    POCT Hemoglobin, Arterial 13.4 12.0 - 16.0 g/dL    POCT Anion Gap, Arterial 4 (L) 10 - 25 mmo/L    Patient Temperature 37.0 degrees Celsius   POCT GLUCOSE   Result Value Ref Range    POCT Glucose 61 (L) 74 - 99 mg/dL   POCT GLUCOSE   Result Value Ref Range    POCT Glucose 151 (H) 74 - 99 mg/dL   CBC and Auto Differential   Result Value Ref Range    WBC 14.7 (H) 4.4 - 11.3 x10*3/uL    nRBC 0.0 0.0 - 0.0 /100 WBCs    RBC 3.04 (L) 4.00 - 5.20 x10*6/uL    Hemoglobin 10.9 (L) 12.0 - 16.0 g/dL    Hematocrit 36.3 36.0 - 46.0 %     (H) 80 - 100 fL    MCH 35.9 (H) 26.0 - 34.0 pg    MCHC 30.0 (L) 32.0 - 36.0 g/dL    RDW 16.5 (H) 11.5 - 14.5 %    Platelets 88 (L) 150 - 450 x10*3/uL    Neutrophils % 93.8 40.0 - 80.0 %    Immature Granulocytes %, Automated 1.5 (H) 0.0 - 0.9 %    Lymphocytes % 1.6 13.0 - 44.0 %    Monocytes % 2.6 2.0 - 10.0 %    Eosinophils % 0.3 0.0 - 6.0 %    Basophils % 0.2 0.0 - 2.0 %    Neutrophils Absolute 13.77 (H) 1.20 - 7.70 x10*3/uL    Immature Granulocytes Absolute, Automated 0.22 0.00 - 0.70 x10*3/uL    Lymphocytes Absolute 0.23 (L) 1.20 - 4.80 x10*3/uL    Monocytes Absolute 0.38 0.10 - 1.00 x10*3/uL    Eosinophils Absolute 0.04 0.00 - 0.70 x10*3/uL    Basophils Absolute 0.03 0.00 - 0.10 x10*3/uL   Type and screen   Result Value Ref Range    ABO TYPE A     Rh TYPE POS     ANTIBODY SCREEN NEG    B-type natriuretic peptide   Result Value Ref Range     (H) 0 - 99 pg/mL   Light Blue Top   Result Value Ref Range    Extra Tube Hold for add-ons.    Green Top   Result Value Ref Range    Extra Tube Hold for add-ons.    Gray Top   Result Value Ref Range    Extra Tube Hold for add-ons.    POCT GLUCOSE   Result Value Ref Range    POCT Glucose 104 (H) 74 - 99 mg/dL   Blood Gas Arterial Full Panel   Result Value Ref Range    POCT pH, Arterial 7.25 (LL) 7.38 - 7.42 pH    POCT pCO2, Arterial 65 (H) 38 - 42 mm Hg    POCT pO2, Arterial 84 (L) 85 - 95 mm Hg    POCT  SO2, Arterial 98 94 - 100 %    POCT Oxy Hemoglobin, Arterial 94.7 94.0 - 98.0 %    POCT Hematocrit Calculated, Arterial 50.0 (H) 36.0 - 46.0 %    POCT Sodium, Arterial 136 136 - 145 mmol/L    POCT Potassium, Arterial 3.7 3.5 - 5.3 mmol/L    POCT Chloride, Arterial 106 98 - 107 mmol/L    POCT Ionized Calcium, Arterial 1.35 (H) 1.10 - 1.33 mmol/L    POCT Glucose, Arterial 109 (H) 74 - 99 mg/dL    POCT Lactate, Arterial 0.7 0.4 - 2.0 mmol/L    POCT Base Excess, Arterial -0.8 -2.0 - 3.0 mmol/L    POCT HCO3 Calculated, Arterial 28.5 (H) 22.0 - 26.0 mmol/L    POCT Hemoglobin, Arterial 16.7 (H) 12.0 - 16.0 g/dL    POCT Anion Gap, Arterial 5 (L) 10 - 25 mmo/L    Patient Temperature 37.0 degrees Celsius    FiO2 91 %   CBC and Auto Differential   Result Value Ref Range    WBC 18.7 (H) 4.4 - 11.3 x10*3/uL    nRBC 0.1 (H) 0.0 - 0.0 /100 WBCs    RBC 3.64 (L) 4.00 - 5.20 x10*6/uL    Hemoglobin 12.9 12.0 - 16.0 g/dL    Hematocrit 42.5 36.0 - 46.0 %     (H) 80 - 100 fL    MCH 35.4 (H) 26.0 - 34.0 pg    MCHC 30.4 (L) 32.0 - 36.0 g/dL    RDW 16.4 (H) 11.5 - 14.5 %    Platelets 105 (L) 150 - 450 x10*3/uL    Neutrophils % 91.3 40.0 - 80.0 %    Immature Granulocytes %, Automated 1.3 (H) 0.0 - 0.9 %    Lymphocytes % 2.2 13.0 - 44.0 %    Monocytes % 4.6 2.0 - 10.0 %    Eosinophils % 0.4 0.0 - 6.0 %    Basophils % 0.2 0.0 - 2.0 %    Neutrophils Absolute 17.11 (H) 1.20 - 7.70 x10*3/uL    Immature Granulocytes Absolute, Automated 0.24 0.00 - 0.70 x10*3/uL    Lymphocytes Absolute 0.41 (L) 1.20 - 4.80 x10*3/uL    Monocytes Absolute 0.86 0.10 - 1.00 x10*3/uL    Eosinophils Absolute 0.08 0.00 - 0.70 x10*3/uL    Basophils Absolute 0.03 0.00 - 0.10 x10*3/uL   Renal function panel   Result Value Ref Range    Glucose 89 74 - 99 mg/dL    Sodium 144 136 - 145 mmol/L    Potassium 5.5 (H) 3.5 - 5.3 mmol/L    Chloride 108 (H) 98 - 107 mmol/L    Bicarbonate 28 21 - 32 mmol/L    Anion Gap 14 10 - 20 mmol/L    Urea Nitrogen 25 (H) 6 - 23 mg/dL     Creatinine 1.37 (H) 0.50 - 1.05 mg/dL    eGFR 42 (L) >60 mL/min/1.73m*2    Calcium 9.1 8.6 - 10.6 mg/dL    Phosphorus 2.0 (L) 2.5 - 4.9 mg/dL    Albumin 2.8 (L) 3.4 - 5.0 g/dL   Magnesium   Result Value Ref Range    Magnesium 2.35 1.60 - 2.40 mg/dL   Blood Gas Venous Full Panel   Result Value Ref Range    POCT pH, Venous 7.20 (LL) 7.33 - 7.43 pH    POCT pCO2, Venous 73 (HH) 41 - 51 mm Hg    POCT pO2, Venous 41 35 - 45 mm Hg    POCT SO2, Venous 71 45 - 75 %    POCT Oxy Hemoglobin, Venous 69.1 45.0 - 75.0 %    POCT Hematocrit Calculated, Venous 41.0 36.0 - 46.0 %    POCT Sodium, Venous 136 136 - 145 mmol/L    POCT Potassium, Venous 5.9 (H) 3.5 - 5.3 mmol/L    POCT Chloride, Venous 106 98 - 107 mmol/L    POCT Ionized Calicum, Venous 1.33 1.10 - 1.33 mmol/L    POCT Glucose, Venous 95 74 - 99 mg/dL    POCT Lactate, Venous 1.1 0.4 - 2.0 mmol/L    POCT Base Excess, Venous -1.4 -2.0 - 3.0 mmol/L    POCT HCO3 Calculated, Venous 28.5 (H) 22.0 - 26.0 mmol/L    POCT Hemoglobin, Venous 13.6 12.0 - 16.0 g/dL    POCT Anion Gap, Venous 7.0 (L) 10.0 - 25.0 mmol/L    Patient Temperature 37.0 degrees Celsius    FiO2 40 %   Troponin I, High Sensitivity   Result Value Ref Range    Troponin I, High Sensitivity 63 (H) 0 - 34 ng/L   POCT GLUCOSE   Result Value Ref Range    POCT Glucose 99 74 - 99 mg/dL   Heparin Assay, UFH   Result Value Ref Range    Heparin Unfractionated 1.9 (HH) See Comment Below for Therapeutic Ranges IU/mL   Calcium, ionized   Result Value Ref Range    POCT Calcium, Ionized 1.31 1.1 - 1.33 mmol/L   POCT GLUCOSE   Result Value Ref Range    POCT Glucose 69 (L) 74 - 99 mg/dL   Transthoracic Echo (TTE) Complete   Result Value Ref Range    AV pk ashlee 2.03 m/s    AV mn grad 8.0 mmHg    MV avg E/e' ratio 12.95     LA vol index A/L 33.3 ml/m2    RV free wall pk S' 18.50 cm/s    AV pk grad 16.5 mmHg   BLOOD GAS VENOUS FULL PANEL   Result Value Ref Range    POCT pH, Venous 7.35 7.33 - 7.43 pH    POCT pCO2, Venous 52 (H) 41 - 51  mm Hg    POCT pO2, Venous 28 (L) 35 - 45 mm Hg    POCT SO2, Venous 51 45 - 75 %    POCT Oxy Hemoglobin, Venous 49.8 45.0 - 75.0 %    POCT Hematocrit Calculated, Venous 39.0 36.0 - 46.0 %    POCT Sodium, Venous 137 136 - 145 mmol/L    POCT Potassium, Venous 4.5 3.5 - 5.3 mmol/L    POCT Chloride, Venous 106 98 - 107 mmol/L    POCT Ionized Calicum, Venous 1.30 1.10 - 1.33 mmol/L    POCT Glucose, Venous 98 74 - 99 mg/dL    POCT Lactate, Venous 1.3 0.4 - 2.0 mmol/L    POCT Base Excess, Venous 2.1 -2.0 - 3.0 mmol/L    POCT HCO3 Calculated, Venous 28.7 (H) 22.0 - 26.0 mmol/L    POCT Hemoglobin, Venous 12.9 12.0 - 16.0 g/dL    POCT Anion Gap, Venous 7.0 (L) 10.0 - 25.0 mmol/L    Patient Temperature 37.0 degrees Celsius    FiO2 40 %   POCT GLUCOSE   Result Value Ref Range    POCT Glucose 97 74 - 99 mg/dL   Heparin Assay, UFH   Result Value Ref Range    Heparin Unfractionated 1.6 (HH) See Comment Below for Therapeutic Ranges IU/mL   Ammonia   Result Value Ref Range    Ammonia 34 16 - 53 umol/L   POCT GLUCOSE   Result Value Ref Range    POCT Glucose 73 (L) 74 - 99 mg/dL   POCT GLUCOSE   Result Value Ref Range    POCT Glucose 62 (L) 74 - 99 mg/dL   POCT GLUCOSE   Result Value Ref Range    POCT Glucose 102 (H) 74 - 99 mg/dL   Vancomycin, Trough   Result Value Ref Range    Vancomycin, Trough 21.9 (HH) 5.0 - 20.0 ug/mL   POCT GLUCOSE   Result Value Ref Range    POCT Glucose 91 74 - 99 mg/dL   Comprehensive metabolic panel   Result Value Ref Range    Glucose 67 (L) 74 - 99 mg/dL    Sodium 144 136 - 145 mmol/L    Potassium 4.3 3.5 - 5.3 mmol/L    Chloride 109 (H) 98 - 107 mmol/L    Bicarbonate 25 21 - 32 mmol/L    Anion Gap 14 10 - 20 mmol/L    Urea Nitrogen 24 (H) 6 - 23 mg/dL    Creatinine 1.45 (H) 0.50 - 1.05 mg/dL    eGFR 39 (L) >60 mL/min/1.73m*2    Calcium 8.5 (L) 8.6 - 10.6 mg/dL    Albumin 2.3 (L) 3.4 - 5.0 g/dL    Alkaline Phosphatase 99 33 - 136 U/L    Total Protein 4.1 (L) 6.4 - 8.2 g/dL    AST 34 9 - 39 U/L    Bilirubin,  Total 1.3 (H) 0.0 - 1.2 mg/dL    ALT 11 7 - 45 U/L   Magnesium   Result Value Ref Range    Magnesium 1.96 1.60 - 2.40 mg/dL   POCT GLUCOSE   Result Value Ref Range    POCT Glucose 72 (L) 74 - 99 mg/dL   POCT GLUCOSE   Result Value Ref Range    POCT Glucose 78 74 - 99 mg/dL   POCT GLUCOSE   Result Value Ref Range    POCT Glucose 111 (H) 74 - 99 mg/dL     CT chest abdomen pelvis w IV contrast    Result Date: 6/4/2024  Interpreted By:  Alfredo Whaley,  and Karel Omalley STUDY: CT CHEST ABDOMEN PELVIS W IV CONTRAST;  6/4/2024 4:50 pm   INDICATION: Signs/Symptoms:AMS. Per EMR: 69-year-old female with history of 2015 renal transplant admitted for emphysematous pyelonephritis and acute pancreatitis with new onset respiratory distress   COMPARISON: CT abdomen pelvis 12/14/2011   ACCESSION NUMBER(S): [id number]   ORDERING CLINICIAN: BRIANA ROSALES   TECHNIQUE: CT of the chest, abdomen and pelvis was performed. Contiguous axial images were obtained at 3 mm slice thickness through the chest, abdomen, and pelvis with coronal and sagittal reconstruction.   A total of 75 mL of Omnipaque 350 was administered prior to this exam without immediate complication.   FINDINGS: CHEST:   LUNGS/PLEURA/AIRWAYS: Moderate right and small left pleural effusions. Mild consolidation of the posterior aspect of the right lung may be related to atelectasis or developing infectious/inflammatory process. No pulmonary nodules are visualized. There is opacification within the right middle and lower lobe bronchi suggestive of aspiration or mucous plugging. Small volume of material layering dependently within the distal trachea. The endotracheal tube is 3.7 cm above the yolanda.   HEART: The heart is mildly enlarged. There is no pericardial effusion.   VESSELS: The intrathoracic aorta and main pulmonary artery are nonenlarged. No significant coronary artery calcifications. Minimal calcified atherosclerosis of the thoracic aorta.    MEDIASTINUM/ESTELA: No hilar or mediastinal lymphadenopathy. Enteric tube is visualized coursing along the esophagus.   CHEST WALL: The soft tissues and osseous structures of the chest wall are unremarkable. The left thyroid lobe is absent.     ABDOMEN:   LIVER: The liver is nonenlarged with normal, homogeneous attenuation. No focal hepatic lesions are identified.   BILE DUCTS: The intrahepatic and extrahepatic bile ducts are nondilated.   GALLBLADDER: The gallbladder is nondistended without evidence of radiopaque cholelithiasis or cholecystitis.   PANCREAS: Diffusely enlarged and edematous pancreas with diffuse hazy peripancreatic fat stranding  consistent with patient's acute interstitial pancreatitis. Multiple parenchymal calcific foci are also noted. No evidence of pancreatic necrosis several peripancreatic fluid collections including a fairly well-circumscribed collection anterior to the pancreatic neck measuring 1.8 x 1.4 cm, and an ill-defined collection more inferiorly measuring 9.4 x 3.3 cm (series 2, image 116 and image 103). No evidence of thrombosis within the adjacent vessels.   SPLEEN: The spleen is nonenlarged with homogeneous attenuation.   ADRENAL GLANDS: The bilateral adrenal glands are normal in appearance.   KIDNEYS AND URETERS: Severe atrophy and cystic appearance of the bilateral native kidneys. The transplant kidney in the right hemipelvis contains locules of gas within the collecting system. Otherwise fairly normal enhancement of the transplant renal cortex without evidence of hydroureteronephrosis or nephrolithiasis.     PELVIS:   BLADDER: There is a Dominique catheter in place with decompression of the bladder. Small volume of anti dependently layering gas within the bladder lumen.   REPRODUCTIVE ORGANS: The  uterus is present. The endometrial stripe measures 9 mm in thickness. No pelvic masses are identified.   BOWEL: Enteric tube tip overlies the gastric body. The stomach is unremarkable. Hazy  wall thickening and adjacent fat stranding involving the entire duodenum and much of the proximal small bowel and several loops of large bowel, most pronounced at the ascending colon. Diverticulosis without diverticulitis. The appendix is not definitively visualized, however no evidence of pericecal fat stranding.   VESSELS: Moderate calcified atherosclerosis of the distal infrarenal aorta and bilateral common iliac arteries. There is an IVC filter within the infrarenal inferior vena cava. Collateral vessel within the lower abdomen forms a communication between the bilateral common femoral veins.   PERITONEUM/RETROPERITONEUM/LYMPH NODES: Diffuse hazy fat stranding throughout the upper mid abdomen with a small volume of free fluid collecting in the bilateral paracolic gutters. No definite free intraperitoneal air. Few prominent and mildly enlarged retroperitoneal lymph nodes measuring up to 1.2 cm in short axis.   OSSEOUS STRUCTURES: No suspicious osseous lesions or acute traumatic injury.   SOFT TISSUES/ABDOMINAL WALL: Mild diffuse body wall edema. Focal stranding involving the deep subcutaneous tissues of the right lateral lower abdomen.       1. Enlarged edematous pancreas without definite necrotic changes compatible with patient's known acute interstitial appendicitis. Few peripancreatic fluid collections without discrete wall. Diffuse edema throughout the upper mesentery. 2. Atrophy of the bilateral native kidneys with a right hemipelvis transplant kidney. Few foci of gas within the transplant renal collecting system is likely secondary to patient's known emphysematous pyelonephritis. 3. Evidence of mucous plugging and/or aspiration involving the right lower and middle segmental bronchi with associated small right pleural effusion and collapse of the posterior right lower lobe. Small volume of material layering dependently within the distal trachea. Underlying infectious or inflammatory process is not excluded.  4. Small left pleural effusion. 5. Focal stranding of the deep subcutaneous tissues of the right lateral lower abdomen. Correlate with any concern for soft tissue infection or cellulitis of this region. 6. Endotracheal tube tip terminates 3.7 cm above the yolanda. 7. Thickened endometrial stripe in the uterus. Elective ultrasound correlation is recommended.   I personally reviewed the image(s)/study and resident interpretation as stated by Dr. Lyssa Vinson MD. I agree with the findings as stated. This study was interpreted at [facility], [place], [place].   MACRO: None.   Signed by: Alfredo Whaley 6/4/2024 11:10 PM Dictation workstation:   [id number]    Transthoracic Echo (TTE) Complete    Result Date: 6/4/2024   [facility], [address] [place] [address]                Tel [phone] and Fax [phone] TRANSTHORACIC ECHOCARDIOGRAM REPORT  Patient Name:      JENNY BARAJAS      Reading Physician:    [id number] Familia Solorzano MD Study Date:        6/4/2024             Ordering Provider:    [id number] BRIANA ROSALES MRN/PID:           [id number]             Fellow: Accession#:        [id number]         Nurse: Date of Birth/Age: 1955 / 69 years Sonographer:          Sirena Kinney RDCS Gender:            F                    Additional Staff: Height:            157.48 cm            Admit Date:           6/1/2024 Weight:            96.62 kg             Admission Status:     Inpatient - STAT BSA / BMI:         1.96 m2 / 38.96      Encounter#:           [id number]                    kg/m2                                         Department Location:  [place] [address] MICU Blood Pressure: 122 /65 mmHg Study Type:    TRANSTHORACIC ECHO (TTE) COMPLETE Diagnosis/ICD: Dyspnea,  unspecified-R06.00; Hypoxemia-R09.02 Indication:    Dyspnea CPT Code:      Echo Complete w Full Doppler-[id number] Patient History: Pertinent History: COPD and A-Fib. Pylonephritis, Renal Tx. 2015, Hypothyroid,                    Hyperprathyroid, HFmEF 45-50%. Study Detail: The following Echo studies were performed: M-Mode, 2D, Doppler and               color flow. Technically challenging study due to body habitus and               prominent lung artifact. The patient is intubated. Definity used               as a contrast agent for endocardial border definition. Total               contrast used for this procedure was 4.0 mL via IV push.  Critical Event Critical Event: Test was completed as per department protocol. Critical Finding: Decreased EF. Time Test was Completed: 3:47:00 PM Notified: Jeanine. Attending notification time: 5:16:00 PM  PHYSICIAN INTERPRETATION: Left Ventricle: The left ventricular systolic function is mildly decreased, with an estimated ejection fraction of 40-45%. There is global hypokinesis of the left ventricle with minor regional variations. The left ventricular cavity size was not assessed. Left ventricular diastolic filling was not assessed. Left Atrium: The left atrium is enlarged. Right Ventricle: The right ventricle is mildly enlarged. There is normal right ventricular global systolic function. Right Atrium: The right atrium was not well visualized. Aortic Valve: The aortic valve was not well visualized. There is no evidence of aortic valve regurgitation. The peak instantaneous gradient of the aortic valve is 16.5 mmHg. The mean gradient of the aortic valve is 8.0 mmHg. Mitral Valve: The mitral valve was not well visualized. Mitral valve regurgitation was not assessed. Tricuspid Valve: The tricuspid valve was not well visualized. There is trace tricuspid regurgitation. The right ventricular systolic pressure is unable to be estimated. Pulmonic Valve: The pulmonic valve is not well  visualized. The pulmonic valve regurgitation was not well visualized. Pericardium: There is a trivial pericardial effusion. There is a pericardial fat pad present. Aorta: The aortic root was not well visualized. In comparison to the previous echocardiogram(s): Compared with study from 2022, no significant change. The right ventricle appears now mildly dilated compared to normal size.  CONCLUSIONS:  1. Left ventricular systolic function is mildly decreased with a 40-45% estimated ejection fraction.  2. There is global hypokinesis of the left ventricle with minor regional variations.  3. The left atrium is enlarged.  4. Poorly visualized anatomical structures due to suboptimal image quality. QUANTITATIVE DATA SUMMARY: 2D MEASUREMENTS:              Normal Ranges: LAs: 4.00 cm (2.7-4.0cm) LA VOLUME:                               Normal Ranges: LA Vol A4C:        62.0 ml    (22+/-6mL/m2) LA Vol A2C:        68.9 ml LA Vol BP:         65.3 ml LA Vol Index A4C:  31.6ml/m2 LA Vol Index A2C:  35.1 ml/m2 LA Vol Index BP:   33.3 ml/m2 LA Area A4C:       22.1 cm2 LA Area A2C:       23.3 cm2 LA Major Axis A4C: 6.7 cm LA Major Axis A2C: 6.7 cm LA Volume Index:   33.3 ml/m2 AORTA MEASUREMENTS:                    Normal Ranges: Asc Ao, d: 2.90 cm (2.1-3.4cm) LV DIASTOLIC FUNCTION:                        Normal Ranges: MV Peak E:    1.23 m/s (0.7-1.2 m/s) MV e'         0.10 m/s (>8.0) MV lateral e' 0.09 m/s MV medial e'  0.10 m/s E/e' Ratio:   12.95    (<8.0) AORTIC VALVE:                                    Normal Ranges: AoV Vmax:                2.03 m/s  (<=1.7m/s) AoV Peak P.5 mmHg (<20mmHg) AoV Mean P.0 mmHg  (1.7-11.5mmHg) LVOT Max Johnathan:            1.05 m/s  (<=1.1m/s) AoV VTI:                 31.00 cm  (18-25cm) LVOT VTI:                16.00 cm AoV Dimensionless Index: 0.52  RIGHT VENTRICLE: RV Basal 4.20 cm RV Mid   2.70 cm RV Major 9.4 cm RV s'    0.18 m/s PULMONIC VALVE:                       Normal Ranges: PV Max Johnathan: 1.4 m/s  (0.6-0.9m/s) PV Max P.6 mmHg  [id number] Familia Solorzano MD Electronically signed on 2024 at 6:54:29 PM  ** Final **     CT head wo IV contrast    Result Date: 2024  Interpreted By:  Jj Jimenez and Nakamoto Kent STUDY: CT HEAD WO IV CONTRAST;  2024 4:49 pm   INDICATION: Signs/Symptoms:AMS.   COMPARISON: CT head without contrast 2022   ACCESSION NUMBER(S): [id number]   ORDERING CLINICIAN: BRIANA ROSALES   TECHNIQUE: Noncontrast axial CT scan of head was performed. Angled reformats in brain and bone windows were generated. The images were reviewed in bone, brain, blood and soft tissue windows.   FINDINGS: CSF Spaces: The ventricles, sulci and basal cisterns are within normal limits for age. There is no extraaxial fluid collection.   Parenchyma: There is no mass effect or midline shift.  There is no intracranial hemorrhage. There is a redemonstrated small hypodensity within the left cerebellar hemisphere that likely represents remote ischemic infarct. Similar mild periventricular white matter hypodensities that is likely represents sequela of chronic small-vessel disease. Otherwise, The grey-white differentiation is intact.   Calvarium: The calvarium is unremarkable.   Paranasal sinuses and mastoids: Visualized paranasal sinuses and mastoids are clear.       Similar small hypodensity in the left cerebellar hemisphere that likely represents remote ischemic injury. Nonspecific periventricular white matter changes that is compatible with sequela of chronic small-vessel disease.   No evidence of acute cortical infarct or intracranial hemorrhage.   No evidence of intracranial hemorrhage or displaced skull fracture.   MACRO: None   Signed by: Jj Jimenez 2024 6:09 PM Dictation workstation:   [id number]    CT cervical spine wo IV contrast    Result Date: 2024  Interpreted By:  Jj Jimenez and Nakamoto Kent STUDY: CT CERVICAL SPINE WO IV CONTRAST; CT  LUMBAR SPINE W IV CONTRAST; CT THORACIC SPINE W IV CONTRAST;  6/4/2024 4:50 pm; 6/4/2024 5:02 pm; 6/4/2024 5:01 pm   INDICATION: Signs/Symptoms:AMS; Signs/Symptoms:recon from CT C/A/P.   COMPARISON: Same day CT chest abdomen pelvis.   ACCESSION NUMBER(S): [id number]; [id number]; [id number]   ORDERING CLINICIAN: BRIANA ROSALES   TECHNIQUE: Axial CT images of the cervical, thoracic, and lumbar spine are obtained. Axial, coronal and sagittal reconstructions are provided for review.   FINDINGS: Cervical spine   Fractures: There is no evidence for an acute fracture of the cervical spine.   Vertebral Alignment: Within normal limits.   Craniocervical Junction: The odontoid process and craniocervical junction are intact.   Vertebrae/Disc Spaces:  The cervical vertebral body heights are intact and the disc spaces are preserved.   Prevertebral/Paraspinal Soft Tissues: The prevertebral and paraspinal soft tissues are unremarkable. There is a nasogastric tube that terminates outside the field of view.   Thoracic spine:   Alignment: Slight levocurvature of the upper thoracic spine. Otherwise, within normal limits.   Vertebrae/Intervertebral Discs: The thoracic vertebral body heights are intact. The disc spaces are preserved.   There are small anterior osteophytes at T8-T9. There is no significant spinal canal or neural foraminal stenosis.   Paraspinal soft tissues: Please refer to same-day CT chest abdomen and pelvis.   Lumbar spine:   ALIGNMENT: No traumatic spondylolisthesis or traumatic facet widening.   VERTEBRAE: No acute fracture. Vertebral body heights are maintained.   SPINAL CANAL/INTERVERTEBRAL DISCS: No significant disc space narrowing. Varying degrees of degenerative disc disease. There is moderate bilateral neural foraminal stenosis at L2-L3. There is moderate disc bulge, mild ligamentum flavum hypertrophy, moderate facet arthropathy at L3-L4 with resultant moderate spinal canal stenosis. There is also  moderate bilateral neural foraminal stenosis at L3-L4. There is moderate disc bulge, mild ligamentum flavum hypertrophy, and facet arthropathy at L4-L5 with moderate spinal canal stenosis. There is moderate right and mild left sided L4-L5 neural foraminal stenosis. Otherwise, no significant neural foraminal or spinal canal stenosis.   NEURAL FORAMINA: No significant neural foraminal stenosis. Varying degrees of neural foraminal stenosis,   PARASPINAL SOFT TISSUES: Please refer to same-day CT chest abdomen and pelvis.   VISUALIZED ABDOMEN: No significant abnormality.       No evidence for an acute fracture or subluxation of the cervical spine.   No significant thoracic spine spinal canal or neural foraminal stenosis.   Multilevel degenerative changes of the lumbar spine most prominent at L3-L4 and L4-L5 with moderate spinal canal stenosis and moderate neural foraminal stenosis as described above.   Please refer to same-day CT chest abdomen pelvis regarding paraspinal soft tissue findings within the chest, abdomen, and pelvis..   MACRO: None   Signed by: Jj Jimenez 6/4/2024 6:08 PM Dictation workstation:   [id number]    CT thoracic spine w IV contrast    Result Date: 6/4/2024  Interpreted By:  Jj Jimenez and Nakamoto Kent STUDY: CT CERVICAL SPINE WO IV CONTRAST; CT LUMBAR SPINE W IV CONTRAST; CT THORACIC SPINE W IV CONTRAST;  6/4/2024 4:50 pm; 6/4/2024 5:02 pm; 6/4/2024 5:01 pm   INDICATION: Signs/Symptoms:AMS; Signs/Symptoms:recon from CT C/A/P.   COMPARISON: Same day CT chest abdomen pelvis.   ACCESSION NUMBER(S): [id number]; [id number]; [id number]   ORDERING CLINICIAN: BRIANA ROSALES   TECHNIQUE: Axial CT images of the cervical, thoracic, and lumbar spine are obtained. Axial, coronal and sagittal reconstructions are provided for review.   FINDINGS: Cervical spine   Fractures: There is no evidence for an acute fracture of the cervical spine.   Vertebral Alignment: Within normal limits.   Craniocervical  Junction: The odontoid process and craniocervical junction are intact.   Vertebrae/Disc Spaces:  The cervical vertebral body heights are intact and the disc spaces are preserved.   Prevertebral/Paraspinal Soft Tissues: The prevertebral and paraspinal soft tissues are unremarkable. There is a nasogastric tube that terminates outside the field of view.   Thoracic spine:   Alignment: Slight levocurvature of the upper thoracic spine. Otherwise, within normal limits.   Vertebrae/Intervertebral Discs: The thoracic vertebral body heights are intact. The disc spaces are preserved.   There are small anterior osteophytes at T8-T9. There is no significant spinal canal or neural foraminal stenosis.   Paraspinal soft tissues: Please refer to same-day CT chest abdomen and pelvis.   Lumbar spine:   ALIGNMENT: No traumatic spondylolisthesis or traumatic facet widening.   VERTEBRAE: No acute fracture. Vertebral body heights are maintained.   SPINAL CANAL/INTERVERTEBRAL DISCS: No significant disc space narrowing. Varying degrees of degenerative disc disease. There is moderate bilateral neural foraminal stenosis at L2-L3. There is moderate disc bulge, mild ligamentum flavum hypertrophy, moderate facet arthropathy at L3-L4 with resultant moderate spinal canal stenosis. There is also moderate bilateral neural foraminal stenosis at L3-L4. There is moderate disc bulge, mild ligamentum flavum hypertrophy, and facet arthropathy at L4-L5 with moderate spinal canal stenosis. There is moderate right and mild left sided L4-L5 neural foraminal stenosis. Otherwise, no significant neural foraminal or spinal canal stenosis.   NEURAL FORAMINA: No significant neural foraminal stenosis. Varying degrees of neural foraminal stenosis,   PARASPINAL SOFT TISSUES: Please refer to same-day CT chest abdomen and pelvis.   VISUALIZED ABDOMEN: No significant abnormality.       No evidence for an acute fracture or subluxation of the cervical spine.   No  significant thoracic spine spinal canal or neural foraminal stenosis.   Multilevel degenerative changes of the lumbar spine most prominent at L3-L4 and L4-L5 with moderate spinal canal stenosis and moderate neural foraminal stenosis as described above.   Please refer to same-day CT chest abdomen pelvis regarding paraspinal soft tissue findings within the chest, abdomen, and pelvis..   MACRO: None   Signed by: Jj Jimenez 6/4/2024 6:08 PM Dictation workstation:   [id number]    CT lumbar spine w IV contrast    Result Date: 6/4/2024  Interpreted By:  Jj Jimenez,  and Lm Carvajal STUDY: CT CERVICAL SPINE WO IV CONTRAST; CT LUMBAR SPINE W IV CONTRAST; CT THORACIC SPINE W IV CONTRAST;  6/4/2024 4:50 pm; 6/4/2024 5:02 pm; 6/4/2024 5:01 pm   INDICATION: Signs/Symptoms:AMS; Signs/Symptoms:recon from CT C/A/P.   COMPARISON: Same day CT chest abdomen pelvis.   ACCESSION NUMBER(S): [id number]; [id number]; [id number]   ORDERING CLINICIAN: BRIANA ROSALES   TECHNIQUE: Axial CT images of the cervical, thoracic, and lumbar spine are obtained. Axial, coronal and sagittal reconstructions are provided for review.   FINDINGS: Cervical spine   Fractures: There is no evidence for an acute fracture of the cervical spine.   Vertebral Alignment: Within normal limits.   Craniocervical Junction: The odontoid process and craniocervical junction are intact.   Vertebrae/Disc Spaces:  The cervical vertebral body heights are intact and the disc spaces are preserved.   Prevertebral/Paraspinal Soft Tissues: The prevertebral and paraspinal soft tissues are unremarkable. There is a nasogastric tube that terminates outside the field of view.   Thoracic spine:   Alignment: Slight levocurvature of the upper thoracic spine. Otherwise, within normal limits.   Vertebrae/Intervertebral Discs: The thoracic vertebral body heights are intact. The disc spaces are preserved.   There are small anterior osteophytes at T8-T9. There is no significant  spinal canal or neural foraminal stenosis.   Paraspinal soft tissues: Please refer to same-day CT chest abdomen and pelvis.   Lumbar spine:   ALIGNMENT: No traumatic spondylolisthesis or traumatic facet widening.   VERTEBRAE: No acute fracture. Vertebral body heights are maintained.   SPINAL CANAL/INTERVERTEBRAL DISCS: No significant disc space narrowing. Varying degrees of degenerative disc disease. There is moderate bilateral neural foraminal stenosis at L2-L3. There is moderate disc bulge, mild ligamentum flavum hypertrophy, moderate facet arthropathy at L3-L4 with resultant moderate spinal canal stenosis. There is also moderate bilateral neural foraminal stenosis at L3-L4. There is moderate disc bulge, mild ligamentum flavum hypertrophy, and facet arthropathy at L4-L5 with moderate spinal canal stenosis. There is moderate right and mild left sided L4-L5 neural foraminal stenosis. Otherwise, no significant neural foraminal or spinal canal stenosis.   NEURAL FORAMINA: No significant neural foraminal stenosis. Varying degrees of neural foraminal stenosis,   PARASPINAL SOFT TISSUES: Please refer to same-day CT chest abdomen and pelvis.   VISUALIZED ABDOMEN: No significant abnormality.       No evidence for an acute fracture or subluxation of the cervical spine.   No significant thoracic spine spinal canal or neural foraminal stenosis.   Multilevel degenerative changes of the lumbar spine most prominent at L3-L4 and L4-L5 with moderate spinal canal stenosis and moderate neural foraminal stenosis as described above.   Please refer to same-day CT chest abdomen pelvis regarding paraspinal soft tissue findings within the chest, abdomen, and pelvis..   MACRO: None   Signed by: Jj Jimenez 6/4/2024 6:08 PM Dictation workstation:   [id number]    XR chest 1 view    Result Date: 6/4/2024  Interpreted By:  Maggie Harvey, STUDY: XR CHEST 1 VIEW; 6/4/2024 4:01 pm   INDICATION: Signs/Symptoms:intubation.   COMPARISON:  Radiograph dated 06/04/2024, 10:55 a.m.   ACCESSION NUMBER(S): [id number]   ORDERING CLINICIAN: BRIANA ROSALES   FINDINGS: Interval intubation with the tip of the ET tube 5 cm from the yolanda. Enteric tube is in place with the tip outside the field of view.   The cardiac silhouette size is enlarged, unchanged.   There is persistent moderate right pleural effusion. Mild interstitial prominence. No sizable pneumothorax.   No acute osseous abnormality.       1. Interval intubation as described above. 2. Persistent moderate right pleural effusion with associated atelectasis/consolidation.       Signed by: Maggie Stanton 6/4/2024 4:52 PM Dictation workstation:   [id number]    XR abdomen 1 view    Result Date: 6/4/2024  Interpreted By:  Dennise Beck, STUDY: XR ABDOMEN 1 VIEW;  6/4/2024 4:02 pm   INDICATION: Signs/Symptoms:OG tube placement.   COMPARISON: 06/03/2024   ACCESSION NUMBER(S): [id number]   ORDERING CLINICIAN: BRIANA ROSALES   FINDINGS: There is an NG tube with the tip in the region of the gastric body. Limited evaluation of pneumoperitoneum on supine imaging, however no gross evidence of free air is noted.   Nonspecific bowel gas pattern. Images of the lower lung fields demonstrate a right pleural effusion, plus atelectasis/consolidation   Osseous structures demonstrate no acute bony changes.       1.  NG tube with the tip within the region of the stomach   MACRO: None   Signed by: Dennise Beck 6/4/2024 4:10 PM Dictation workstation:   [id number]    XR chest 1 view    Result Date: 6/4/2024  Interpreted By:  Dennise Beck and Hofer Lindsay STUDY: XR CHEST 1 VIEW;  6/4/2024 10:55 am   INDICATION: Signs/Symptoms:Shortness of breath.   COMPARISON: Chest radiograph 06/03/2024   ACCESSION NUMBER(S): [id number]   ORDERING CLINICIAN: TANK PALMER   FINDINGS: AP radiograph of the chest was provided.   CARDIOMEDIASTINAL SILHOUETTE: Cardiomediastinal silhouette is stable in size and configuration.    LUNGS: Blunting of the right costophrenic angle with adjacent hazy opacity. Linear opacities in the bibasilar lung fields. Low bronchovascular crowding secondary to low lung volumes.   ABDOMEN: No remarkable upper abdominal findings.   BONES: No acute osseous changes.       1.  Similar appearance of small right pleural effusion with adjacent atelectasis/consolidation.   I personally reviewed the images/study and resident's interpretation and I agree with the findings as stated by Cherelle Flores MD (resident radiologist). This study was analyzed and interpreted at [place], [place].   MACRO: None   Signed by: Dennise Beck 6/4/2024 11:48 AM Dictation workstation:   [id number]    XR abdomen 1 view    Result Date: 6/4/2024  Interpreted By:  Dennise Beck and Dervishi Mario STUDY: XR ABDOMEN 1 VIEW; XR CHEST 1 VIEW;  6/3/2024 11:11 pm; 6/3/2024 11:12 pm   INDICATION: Signs/Symptoms:Abdominal pain; Signs/Symptoms:New oxygen requirement.   COMPARISON: Chest and abdomen radiograph: 02/04/2022   ACCESSION NUMBER(S): [id number]; [id number]   ORDERING CLINICIAN: AYDE LEUNG   FINDINGS: AP radiograph of the chest and abdomen were acquired and combined for interpretation.       CARDIOMEDIASTINAL SILHOUETTE: Cardiomediastinal silhouette is normal in size and configuration.   LUNGS: The right costophrenic angle demonstrates blunting suggestive of a small pleural effusion. There are low lung volumes with associated bronchovascular crowding. There are bandlike opacities in the retrocardiac and lower lung lobe regions suggestive of atelectasis. No focal consolidations. No pneumothorax.   ABDOMEN: There is a prominent gastric bubble. Bowel gas pattern is otherwise nonobstructive. There is large amount of retained stool throughout the colon and rectum. No evidence of pneumoperitoneum within the limitations of supine radiographs.   BONES: No acute osseous changes.       1.   Right-sided pleural effusion with associated atelectasis/consolidation. 2. No acute radiographic findings of the abdomen. Large amount of retained stool throughout the colon rectum. 3. Additional findings as above.   I personally reviewed the images/study and I agree with the findings as stated by Resident Nato Cleveland MD. This study was interpreted at [place], [place].   MACRO: None   Signed by: Dennise Beck 6/4/2024 9:47 AM Dictation workstation:   [id number]    XR chest 1 view    Result Date: 6/4/2024  Interpreted By:  Dennise Bcek and Dervishi Mario STUDY: XR ABDOMEN 1 VIEW; XR CHEST 1 VIEW;  6/3/2024 11:11 pm; 6/3/2024 11:12 pm   INDICATION: Signs/Symptoms:Abdominal pain; Signs/Symptoms:New oxygen requirement.   COMPARISON: Chest and abdomen radiograph: 02/04/2022   ACCESSION NUMBER(S): [id number]; [id number]   ORDERING CLINICIAN: AYDE LEUNG   FINDINGS: AP radiograph of the chest and abdomen were acquired and combined for interpretation.       CARDIOMEDIASTINAL SILHOUETTE: Cardiomediastinal silhouette is normal in size and configuration.   LUNGS: The right costophrenic angle demonstrates blunting suggestive of a small pleural effusion. There are low lung volumes with associated bronchovascular crowding. There are bandlike opacities in the retrocardiac and lower lung lobe regions suggestive of atelectasis. No focal consolidations. No pneumothorax.   ABDOMEN: There is a prominent gastric bubble. Bowel gas pattern is otherwise nonobstructive. There is large amount of retained stool throughout the colon and rectum. No evidence of pneumoperitoneum within the limitations of supine radiographs.   BONES: No acute osseous changes.       1.  Right-sided pleural effusion with associated atelectasis/consolidation. 2. No acute radiographic findings of the abdomen. Large amount of retained stool throughout the colon rectum. 3. Additional findings as above.    I personally reviewed the images/study and I agree with the findings as stated by Resident Nato Cleveland MD. This study was interpreted at [facility], [place], [place].   MACRO: None   Signed by: Dennise Beck 6/4/2024 9:47 AM Dictation workstation:   [id number]    XR abdomen 1 view    Result Date: 6/2/2024  EXAMINATION: ONE SUPINE XRAY VIEW(S) OF THE ABDOMEN 6/2/2024 6:28 pm COMPARISON: Correlation made with CT abdomen pelvis from June 1, 2024 HISTORY: ORDERING SYSTEM PROVIDED HISTORY: Distended pain TECHNOLOGIST PROVIDED HISTORY: Reason for exam:->Distended pain FINDINGS: No evidence of bowel obstruction or pneumoperitoneum.  Moderate amount of stool seen throughout the colon.  IVC filter is present.    1. Moderate amount of stool throughout the colon. 2. No bowel obstruction or evidence of pneumoperitoneum.    CT abdomen pelvis wo IV contrast    Result Date: 6/1/2024  EXAMINATION: CT OF THE ABDOMEN AND PELVIS WITHOUT CONTRAST 6/1/2024 2:58 pm TECHNIQUE: CT of the abdomen and pelvis was performed without the administration of intravenous contrast. Multiplanar reformatted images are provided for review. Automated exposure control, iterative reconstruction, and/or weight based adjustment of the mA/kV was utilized to reduce the radiation dose to as low as reasonably achievable. COMPARISON: None. HISTORY: ORDERING SYSTEM PROVIDED HISTORY: abdominal pain, diffuse ttp, nausea, dysuria, low back pain, recent epidural spinal injection TECHNOLOGIST PROVIDED HISTORY: Reason for exam:->abdominal pain, diffuse ttp, nausea, dysuria, low back pain, recent epidural spinal injection Additional Contrast?->None Decision Support Exception - unselect if not a suspected or confirmed emergency medical condition->Emergency Medical Condition (MA) FINDINGS: Evaluation of the solid organs and vascular structures limited without IV contrast.  Small fat filled umbilical hernia.  Dilated central  pulmonary vasculature suggestive of pulmonary hypertension.  Coronary artery disease. Cardiomegaly.  Small hiatal hernia.  Bibasilar subsegmental atelectasis or scarring.  No pneumoperitoneum.  No focal hepatic abnormality.  No radiopaque gallstone.  Spleen normal.  Fullness and inflammation of the pancreatic head and body low suggestive of pancreatitis but suboptimally evaluated without IV contrast.  Recommend clinical and laboratory correlation for pancreatitis and consider follow-up with contrast enhanced imaging to exclude underlying mass. Adrenal glands normal.  Atrophic cystic kidneys.  IVC filter. Atherosclerotic vascular calcifications.  Bowel negative for obstruction or inflammation.  Large colonic stool burden, which may indicate constipation. Right lower quadrant transplant kidney.  Tiny stone in the transplant kidney. Some gas in the right lower quadrant transplant kidney collecting system and the urinary bladder, which could be related to recent instrumentation or infection with gas-forming organism.  Pelvis otherwise negative.  No acute osseous findings or destructive bone lesions.    1. Fullness and inflammation of the pancreatic head and body most suggestive of pancreatitis but suboptimally evaluated without IV contrast. Recommend clinical and laboratory correlation for pancreatitis and consider follow-up with contrast enhanced imaging to exclude underlying mass. 2. Large colonic stool burden, which may indicate constipation. 3. Right lower quadrant transplant kidney. Tiny stone in the transplant kidney. Some gas in the right lower quadrant transplant kidney collecting system and the urinary bladder, which could be related to recent instrumentation or infection with gas-forming organism.    MR lumbar spine wo IV contrast    Result Date: 5/17/2024  * * *Final Report* * * DATE OF EXAM: May 16 2024  4:34PM   [facility]   0303  -  MRI LUMBAR SPINE WO IVCON  / ACCESSION #  [id number] PROCEDURE REASON: SPONDYLOSIS  W/O MYELOPATHY OR RADICULOPATHY      * * * * Physician Interpretation * * * * RESULT: EXAMINATION:  MRI LUMBAR SPINE WO IVCON HISTORY: 69-year-old complaining of bilateral lower extremity pain and difficulty walking since 04/01/2024. COMPARISON: MRI lumbar spine 6/12/2020. TECHNIQUE: Routine noncontrast lumbar spine MRI was performed. RESULT: Counting reference: Lumbosacral junction.  For the purposes of this report, L4-5 is considered the level of the iliac crest and there are 5 lumbar-type vertebrae.  Anatomic variants: None. Vertebral bodies are normal in height. There is new mild disc space narrowing at L2-3. There is stable mild disc space narrowing at L4-5. There is a new mild dextroscoliosis centered at approximately L3-4. The conus medullaris has a normal appearance and terminates at approximately L1. L1-2: There is an annular disc bulge and mild facet hypertrophy. No significant central canal or foraminal stenosis. Findings are stable. L2-3: There is a new large central/right para central disc extrusion with cephalad migration. It compresses the traversing L3 nerve roots. In addition, it compresses the right traversing L2 nerve root. There is mild right and moderate left facet and ligamentous hypertrophy. There is progressing moderate to severe central canal stenosis. There is progressing mild to moderate bilateral foraminal stenosis. L3-4: There is a disc bulge asymmetric to the left. There is mild right and moderate left facet and ligamentum flavum hypertrophy. There is mild central canal stenosis. There is mild right and mild-to-moderate left foraminal stenosis. Findings are stable. L4-5: There is a central disc protrusion compressing the traversing L5 nerve roots. There is moderate right and severe left facet and ligamentum flavum hypertrophy. The previously described synovial cyst arising from the left medial facet joint has resolved. There is improving moderate to severe central canal stenosis. There  is stable moderate right and moderate to severe left foraminal stenosis. L5-S1: There is a minor disc bulge and mild facet hypertrophy.  No significant central canal or foraminal stenosis. Findings are stable. Prevertebral/paraspinal soft tissues are unremarkable. The native kidneys are severely atrophic and replaced by innumerous probable cysts. There is a transplanted kidney in the right pelvis.    IMPRESSION: 1.  At L2-3, there is a new large central/right upper central disc extrusion with cephalad migration. There is compression of the traversing L3 nerve roots. In addition, there is compression of the right traversing L2 nerve root. There is multifactorial moderate to severe central canal stenosis. There is progressing mild to moderate bilateral foraminal stenosis. 2.  At L4-5, there is a stable central disc protrusion compressing the traversing L5 nerve roots. The previously described synovial cyst arising from the left facet joint has resolved. There is improving moderate to severe central canal stenosis. There is stable moderate right and moderate to severe left foraminal stenosis. 3.  Examination is otherwise stable. There is mild diffuse spondylosis as outlined above. Anatomic variant: None.  L4-5 is considered the level of the iliac crest and assume there are 5 lumbar-type vertebrae. Transcribe Date/Time: May 17 2024 12:50P Dictated by: ALAN DENSON MD This examination was interpreted and the report reviewed and electronically signed by: ALAN DENSON MD on May 17 2024  1:18PM  EST Thank you for allowing us to participate in the care of your patient. Should there be any questions regarding this interpretation, please call [phone]. If you are unable to reach us at the number above, please feel free to contact [facility] at [phone].      Assessment/Plan   Lea Paulino is a 69 y.o. female with a history of COPD, renal transplant in 2015 on cyclosporine, mycophenolate  and prednisone, HTN, HLD, hypothyroid, hyperparathyroid, HFmEF (45-50%), AFib, PVD, lymphedema, 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, presenting to the MICU in acute respiratory distress placed on BiPAP and later intubated due to expected clinical course.     Neuro:  #Altered mental status  - hypoglycemic on floor- received D50, 97 in MICU  - Hypercapnia-> VBG pH 7.2/73 -> intubated   - concern for meningitis -> past epidural injection, on immunosuppressants, will consider LP, - head CT negative for acute ischemia, bleed, mass  #psychiatric   - holding clozapine, psych consulted will restart at 25 mg when appropriate     Cardiovascular:  #Reported nonsustained VT at [facility]  #Hypertension  #HFmrEF  - metoprolol tartrate 12.5 QID tartrate held for now   - Optimize electrolytes K > 4, Mg > 2  - EKG 6/4 -> NSR, Left axis deviation  - Holding Bumex 1 mg for now  - Diurese prn   - Echo- EF 40-45%      #Hx AFib  -Rate controlled with beta blocker  -Heparin infusion held for LP  #Hx of DVT LLE 2022  ::On Eliquis  -Heparin infusion held for LP     Pulmonology:  #COPD  - Continue inhaled treatments  #Respiratory acidosis  ::Likely 2/2 to sepsis, obesity, COPD, possible RML atelectasis on xray  ::VBG pH 7.2/73 on BiPAP  ::Patient is compensating for respiratory acidosis  -Continue to monitor with frequent VBG/ABG  -Intubated 6/4 due to expected clinical course      Gastrointestinal:  #Acute pancreatitis  :: Possibly secondary to hypercalcemia (15.9) vs. medications including cyclosporine (less likely)  :: Lipase 1039, CT findings supportive, low back pain initial presentation    - Keep n.p.o.  - triglycerides obtained-> 82  - Pain control with IV medication  - Follow-up random cyclosporine levels  - Strict I/O  - can repeat CT in a few weeks regarding fluid collections     Renal:  #Hypercalcemia  :: s/p subtotal hemithyroidectomy & parathyroidectomy in 2006   :: Calcium 15.9 at  [facility]  :: Potentially the culprit for her presentation of AMS/confusion, pancreatitis, vomiting, large stool burden and constipation, and kidney stones  :: Suppressed intact PTH, has a recent history of elevated PTH with mildly elevated calcium consistent with primary hyperparathyroidism evaluation at that time unremarkable  :: Given severity, was treated at [facility] with [place] calcitonin 4 units/kg subcutaneously and high-volume IV normal saline   - Follow up hypercalcemia evaluation including PTH-RP, SPEP, UPEP, 25-hydroxy vitamin D3, 1, 25 dihydroxy vitamin D3  - Bowel regimen      #LAURA on CKD  :: s/p renal transplant in 2015  :: Follows with Dr Gay at  for renal transplant   :: On mycophenolate, cyclosporine, prednisone  :: Creatinine on admission to [facility] 2.7 (baseline 1.5-1.7), current Cr 1.4  :: LAURA likely prerenal in setting of pancreatitis and hypovolemia  Nephrology consulted   - continue cyclosporine 100 BID  - decrease MMF to 500 BID  - continue prednisone 5 mg daily   - although cyclosporine can cause pancreatitis - would continue at this moment   - recommend NGT for meds if unable to tolerate PO medications.     Infectious Disease:  #Emphysematous pyelonephritis  :: CT A/P with gas in the RLQ transplant kidney collecting system and in the urinary bladder, possible gas-forming infection  :: Patient was on Vanco and Zosyn at outside hospital, reported use of meropenem and gentamicin  - Started vancomycin and meropenem 1 g every 8 hours  - Acyclovir added d/t suspicion of meningitis  - Transplant ID consulted for guidance on antibiotic use  - Transplant nephrology consulted  - Follow-up infectious workup: Blood cultures, urine cultures negative    Urine Cultures:  - 1/12/2022 Enterococcus faecalis, resistant  to gentamicin, intermediate to doxycycline, sensitive to everything else   - 4/2024 Pseudomonas pan-susceptible   - 6/3/2024: negative      Endocrine:  #Hypothyroidism  - Continue levothyroxine  #Hypogylcemia  ::Concern for adrenal insufficiency while on steroids   ::Ammonia normal, TSH nl  -AM cortisol 29.2        DVT: SCD  IVF: PRN  Lines: PIV  Diet: NPO     Code Status: Assumed full code   Surrogate Medical Decision-maker: son [phone]     Urmila Segovia MD  Emergency Medicine, PGY-1

## 2024-06-05 NOTE — CARE PLAN
Problem: Pain  Goal: My pain/discomfort is manageable  Outcome: Progressing     Problem: Safety  Goal: Patient will be injury free during hospitalization  Outcome: Progressing  Goal: I will remain free of falls  Outcome: Progressing     Problem: Daily Care  Goal: Daily care needs are met  Outcome: Progressing     Problem: Psychosocial Needs  Goal: Demonstrates ability to cope with hospitalization/illness  Outcome: Progressing  Goal: Collaborate with me, my family, and caregiver to identify my specific goals  Outcome: Progressing     Problem: Discharge Barriers  Goal: My discharge needs are met  Outcome: Progressing     Problem: Skin  Goal: Participates in plan/prevention/treatment measures  Outcome: Progressing  Flowsheets (Taken 6/4/2024 2053)  Participates in plan/prevention/treatment measures: Discuss with provider PT/OT consult  Goal: Prevent/manage excess moisture  Outcome: Progressing  Flowsheets (Taken 6/4/2024 2053)  Prevent/manage excess moisture: Cleanse incontinence/protect with barrier cream  Goal: Prevent/minimize sheer/friction injuries  Outcome: Progressing  Flowsheets (Taken 6/4/2024 2053)  Prevent/minimize sheer/friction injuries: HOB 30 degrees or less  Goal: Promote/optimize nutrition  Outcome: Progressing  Flowsheets (Taken 6/4/2024 2053)  Promote/optimize nutrition: Assist with feeding  Goal: Promote skin healing  Outcome: Progressing  Flowsheets (Taken 6/4/2024 2053)  Promote skin healing: Ensure correct size (line/device) and apply per  instructions   The patient's goals for the shift include      The clinical goals for the shift include Patient will remain HDS during the shift

## 2024-06-05 NOTE — PROGRESS NOTES
Vancomycin Dosing by Pharmacy- FOLLOW UP    Lea Paulino is a 69 y.o. year old female who Pharmacy has been consulted for vancomycin dosing for other UTI . Based on the patient's indication and renal status this patient is being dosed based on a goal trough/random level of 15-20.     Renal function is currently stable.    Current vancomycin dose: 2000 mg LD yesterday    Most recent trough level: 21.9 mcg/mL    Visit Vitals  /53   Pulse (!) 112   Temp 36.1 °C (97 °F) (Temporal)   Resp 18        Lab Results   Component Value Date    CREATININE 1.45 (H) 2024    CREATININE 1.37 (H) 2024    CREATININE 1.42 (H) 2024    CREATININE 1.70 (H) 2024    CREATININE 1.50 2023        Patient weight is as follows:   Vitals:    24 0400   Weight: 95.4 kg (210 lb 5.1 oz)       Cultures:  No results found for the encounter in last 14 days.       I/O last 3 completed shifts:  In: 3055.1 (31.6 mL/kg) [I.V.:1055.1 (10.9 mL/kg); IV Piggyback:2000]  Out: 2175 (22.5 mL/kg) [Urine:2175 (0.6 mL/kg/hr)]  Weight: 96.8 kg   I/O during current shift:  I/O this shift:  In: 1308.9 [I.V.:68.9; NG/GT:180; IV Piggyback:1060]  Out: 250 [Urine:250]    Temp (24hrs), Av.4 °C (97.6 °F), Min:36 °C (96.8 °F), Max:37.1 °C (98.8 °F)      Assessment/Plan    Above trough goal. Will re-dose with 500 mg x 1    The next level will be obtained on  at am draw. May be obtained sooner if clinically indicated.   Will continue to monitor renal function daily while on vancomycin and order serum creatinine at least every 48 hours if not already ordered.  Follow for continued vancomycin needs, clinical response, and signs/symptoms of toxicity.       Sridevi Soliz, PharmD

## 2024-06-05 NOTE — PROGRESS NOTES
"Lea Paulino is a 69 y.o. female on day 2 of admission presenting with Pyelonephritis.    Subjective   Intubated yesterday for airway protection.   No acute events overnight.   Awake, answering questions in yes or no.     Objective   Range of Vitals (last 24 hours)  Heart Rate:  []   Temp:  [36 °C (96.8 °F)-37.3 °C (99.1 °F)]   Resp:  [14-32]   BP: ()/(46-93)   Weight:  [95.4 kg (210 lb 5.1 oz)]   SpO2:  [94 %-100 %]   Daily Weight  06/05/24 : 95.4 kg (210 lb 5.1 oz)    Body mass index is 38.46 kg/m².    Physical Exam  Patient is more awake today, but still intubated.   On minimal sedation, following commands.   Abdomen is non tender. NO CVA tenderness noted.    Relevant Results  Labs  Results from last 72 hours   Lab Units 06/05/24  0916 06/04/24  1230 06/04/24  1102   WBC AUTO x10*3/uL 14.5* 18.7* 14.7*   HEMOGLOBIN g/dL 11.0* 12.9 10.9*   HEMATOCRIT % 35.9* 42.5 36.3   PLATELETS AUTO x10*3/uL 115* 105* 88*   NEUTROS PCT AUTO % 88.1 91.3 93.8   LYMPHS PCT AUTO % 2.9 2.2 1.6   MONOS PCT AUTO % 6.4 4.6 2.6   EOS PCT AUTO % 0.6 0.4 0.3     Results from last 72 hours   Lab Units 06/05/24  0325 06/04/24  1230 06/04/24  0350   SODIUM mmol/L 144 144 142   POTASSIUM mmol/L 4.3 5.5* 3.4*   CHLORIDE mmol/L 109* 108* 109*   CO2 mmol/L 25 28 28   BUN mg/dL 24* 25* 26*   CREATININE mg/dL 1.45* 1.37* 1.42*   GLUCOSE mg/dL 67* 89 95   CALCIUM mg/dL 8.5* 9.1 9.3   ANION GAP mmol/L 14 14 8*   EGFR mL/min/1.73m*2 39* 42* 40*   PHOSPHORUS mg/dL  --  2.0*  --      Results from last 72 hours   Lab Units 06/05/24  0325 06/04/24  1230 06/04/24  0350 06/03/24  2153   ALK PHOS U/L 99  --  48 57   BILIRUBIN TOTAL mg/dL 1.3*  --  0.5 0.6   PROTEIN TOTAL g/dL 4.1*  --  4.9* 5.2*   ALT U/L 11  --  7 7   AST U/L 34  --  12 18   ALBUMIN g/dL 2.3* 2.8* 2.7* 2.8*     Estimated Creatinine Clearance: 40 mL/min (A) (by C-G formula based on SCr of 1.45 mg/dL (H)).  No results found for: \"CRP\"    Imaging  CXR: 6/3/2024:  1.  " Right-sided pleural effusion with associated  atelectasis/consolidation.  2. No acute radiographic findings of the abdomen. Large amount of  retained stool throughout the colon rectum.  3. Additional findings as above.     CT A/P: 6/1/2024:  1. Fullness and inflammation of the pancreatic head and body most suggestive   of pancreatitis but suboptimally evaluated without IV contrast. Recommend   clinical and laboratory correlation for pancreatitis and consider follow-up   with contrast enhanced imaging to exclude underlying mass.   2. Large colonic stool burden, which may indicate constipation.   3. Right lower quadrant transplant kidney. Tiny stone in the transplant   kidney. Some gas in the right lower quadrant transplant kidney collecting   system and the urinary bladder, which could be related to recent   instrumentation or infection with gas-forming organism.       Micro:  6/1: Urine Cx: Mixed GNR: Gram positive, proteus (sensitivity  to follow), GNR (sensitivity to follow)  6/1: Blood Cx: NGTD (from [facility]) [phone]  6/3: Blood Cx: NGTD  6/3: Urine Cx: Negative  6/5: Urine Cx: NGTD  ---------------------------------------------------------------  5/8: Urine Cx: Mixed edison  4/10: Urine Cx: Pseudomomna  9/2023: Urine Cx: Klebsiella Oxytoca  8/2023: Klebsiella, proteus, GNR  5/2023: Urine Cx: Klebsiella Oxytoca  10/2022: Urine Cx: E Coli     Previous micro:  Urine culture: 6/3/2024: NGTD  Urine cultures: 4/2024: Pseudomonas (Pan susceptible)  Urine Culture: 1/12/2022: E Fecalis (R: Gentamicin, I: Doxy)     Abx:  Vancomycin: 6/1 - p  Meropenem: 6/2 - p  ----------------------  IV PipTazo: One time        Assessment/Plan   69 y.o. female with past medical history of renal transplant-in 2015, on cyclosporine, MMF, prednisone, hypertension, hyperlipidemia, COPD, CHF, A-fib, peripheral arterial disease, lymphedema was transferred from outside hospital for concerns of emphysematous pyelonephritis of the  transplanted kidney and tiny right transplant non obstructing renal stone along with acute pancreatitis. Transferred to [facility] on 6/2. ID consulted for emphysematous pyelonephritis.  Patient was seen by ID at [facility] and was recommended to continue Meropenem and Vancomycin.  Most likely microbiology for emphysematous UTI's are gram negative microorganisms such as Klebsiella and E Coli.  For now will treat her empirically with broad spectrum abx  Meropenem (for possible resistant organisms).     6/5: Remained intubated but weaned off of sedation. Remained hemodynamically stable. Called Micro lab at [facility] and mixed culture is growing proteus and pending speciation for GNR. WBC were 11-20 on repeat UA.      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   Continue IV Vancomycin (Rph to dose).  Will follow up on repeat urine cx.  Will follow up with [facility] again tomorrow.  ([phone])  If she becomes hemodynamically unstable, will recommend Urology consult to be evaluated for nephrostomy tube.      Plan was discussed with ID attending Dr Gipson.  We will continue to follow the patient.      Mckenzie Carlos MD  PGY4, ID Fellow.   Team B Pager: [phone]  For new consults, contact pager [phone].   EPIC chat preferred.  Mckenzie Carlos MD

## 2024-06-05 NOTE — PROGRESS NOTES
Lea Paulino is a 69 y.o. female on day 2 of admission presenting with Pyelonephritis.      Subjective   Pt seen this AM, she was resting in bed, sedation off. Awake and intubated. Able to nod/give thumbs up to questions. Review plan to continue with clozapine. Pt denies concerns with staff, does nod yes to pain, indicates she feels safe and no other concerns with mood, no SI/HI    Clozapine not restarted due to concern for sedation per team       Objective     Last Recorded Vitals      6/5/2024    11:00 AM 6/5/2024    12:00 PM 6/5/2024     1:00 PM 6/5/2024     2:00 PM 6/5/2024     3:00 PM 6/5/2024     4:00 PM 6/5/2024     5:00 PM   Vitals   Systolic 122 133 132 129 125 144 157   Diastolic 67 62 60 63 61 66 66   Heart Rate 111 109 111 113 108 106 105   Temp  37.3 °C (99.1 °F)    36.2 °C (97.2 °F)    Resp 18 21 22 21 21 20 21         Review of Systems    Psychiatric ROS - Adult  As above    Physical Exam      Mental Status Exam  General: NAD  Appearance:  intubated F, fair g/h  Attitude: cooperative, fair engagement  Behavior: opens eyes to voice, calm  Motor Activity: no rigidity, no tremor  Speech: nonverbal, currently intubated  Mood: indicates ok  Affect: calm  Thought Process: answers appropriately  Thought Content: no evident delusions, no SI/HI noted  Thought Perception: not RTIS  Cognition: awake, seems aware of circumstance  Insight: limited   Judgement: limited    Scheduled medications  cycloSPORINE, 100 mg, oral, q12h MARTIN  fentaNYL, 25 mcg, intravenous, Once  fentaNYL PF, 100 mcg, intravenous, Once  levothyroxine, 50 mcg, oral, q AM  meropenem, 1 g, intravenous, q12h  midazolam, 4 mg, intravenous, Once  mycophenolate, 500 mg, orogastric tube, BID  polyethylene glycol, 17 g, oral, BID  predniSONE, 5 mg, oral, Daily  sennosides-docusate sodium, 2 tablet, oral, BID  simvastatin, 40 mg, oral, Nightly      Continuous medications  dexmedeTOMIDine, 0.2-1.5 mcg/kg/hr, Last Rate: Stopped (06/05/24  0000)  fentaNYL, 0-300 mcg/hr, Last Rate: Stopped (06/05/24 0909)  [Held by provider] heparin, 0-4,500 Units/hr, Last Rate: Stopped (06/04/24 1852)      PRN medications  PRN medications: dextrose, dextrose, dextrose, dextrose, fentaNYL, glucagon, glucagon, glucagon, glucagon, heparin, ipratropium-albuteroL, vancomycin    Results for orders placed or performed during the hospital encounter of 06/03/24 (from the past 24 hour(s))   POCT GLUCOSE   Result Value Ref Range    POCT Glucose 102 (H) 74 - 99 mg/dL   Vancomycin, Trough   Result Value Ref Range    Vancomycin, Trough 21.9 (HH) 5.0 - 20.0 ug/mL   POCT GLUCOSE   Result Value Ref Range    POCT Glucose 91 74 - 99 mg/dL   Comprehensive metabolic panel   Result Value Ref Range    Glucose 67 (L) 74 - 99 mg/dL    Sodium 144 136 - 145 mmol/L    Potassium 4.3 3.5 - 5.3 mmol/L    Chloride 109 (H) 98 - 107 mmol/L    Bicarbonate 25 21 - 32 mmol/L    Anion Gap 14 10 - 20 mmol/L    Urea Nitrogen 24 (H) 6 - 23 mg/dL    Creatinine 1.45 (H) 0.50 - 1.05 mg/dL    eGFR 39 (L) >60 mL/min/1.73m*2    Calcium 8.5 (L) 8.6 - 10.6 mg/dL    Albumin 2.3 (L) 3.4 - 5.0 g/dL    Alkaline Phosphatase 99 33 - 136 U/L    Total Protein 4.1 (L) 6.4 - 8.2 g/dL    AST 34 9 - 39 U/L    Bilirubin, Total 1.3 (H) 0.0 - 1.2 mg/dL    ALT 11 7 - 45 U/L   Magnesium   Result Value Ref Range    Magnesium 1.96 1.60 - 2.40 mg/dL   POCT GLUCOSE   Result Value Ref Range    POCT Glucose 72 (L) 74 - 99 mg/dL   Cyclosporine level   Result Value Ref Range    Cyclosporine 109 80 - 210 ng/mL   POCT GLUCOSE   Result Value Ref Range    POCT Glucose 78 74 - 99 mg/dL   POCT GLUCOSE   Result Value Ref Range    POCT Glucose 111 (H) 74 - 99 mg/dL   Heparin Assay   Result Value Ref Range    Heparin Unfractionated 1.4 (HH) See Comment Below for Therapeutic Ranges IU/mL   CBC and Auto Differential   Result Value Ref Range    WBC 14.5 (H) 4.4 - 11.3 x10*3/uL    nRBC 0.1 (H) 0.0 - 0.0 /100 WBCs    RBC 3.12 (L) 4.00 - 5.20 x10*6/uL     Hemoglobin 11.0 (L) 12.0 - 16.0 g/dL    Hematocrit 35.9 (L) 36.0 - 46.0 %     (H) 80 - 100 fL    MCH 35.3 (H) 26.0 - 34.0 pg    MCHC 30.6 (L) 32.0 - 36.0 g/dL    RDW 16.7 (H) 11.5 - 14.5 %    Platelets 115 (L) 150 - 450 x10*3/uL    Neutrophils % 88.1 40.0 - 80.0 %    Immature Granulocytes %, Automated 1.9 (H) 0.0 - 0.9 %    Lymphocytes % 2.9 13.0 - 44.0 %    Monocytes % 6.4 2.0 - 10.0 %    Eosinophils % 0.6 0.0 - 6.0 %    Basophils % 0.1 0.0 - 2.0 %    Neutrophils Absolute 12.74 (H) 1.20 - 7.70 x10*3/uL    Immature Granulocytes Absolute, Automated 0.27 0.00 - 0.70 x10*3/uL    Lymphocytes Absolute 0.42 (L) 1.20 - 4.80 x10*3/uL    Monocytes Absolute 0.93 0.10 - 1.00 x10*3/uL    Eosinophils Absolute 0.08 0.00 - 0.70 x10*3/uL    Basophils Absolute 0.02 0.00 - 0.10 x10*3/uL   POCT GLUCOSE   Result Value Ref Range    POCT Glucose 90 74 - 99 mg/dL   Urinalysis with Reflex Culture and Microscopic   Result Value Ref Range    Color, Urine Yellow Light-Yellow, Yellow, Dark-Yellow    Appearance, Urine Turbid (N) Clear    Specific Gravity, Urine 1.037 (N) 1.005 - 1.035    pH, Urine 6.0 5.0, 5.5, 6.0, 6.5, 7.0, 7.5, 8.0    Protein, Urine 70 (1+) (A) NEGATIVE, 10 (TRACE), 20 (TRACE) mg/dL    Glucose, Urine Normal Normal mg/dL    Blood, Urine 0.1 (1+) (A) NEGATIVE    Ketones, Urine NEGATIVE NEGATIVE mg/dL    Bilirubin, Urine NEGATIVE NEGATIVE    Urobilinogen, Urine 2 (1+) (A) Normal mg/dL    Nitrite, Urine NEGATIVE NEGATIVE    Leukocyte Esterase, Urine 250 Harrison/µL (A) NEGATIVE   Microscopic Only, Urine   Result Value Ref Range    WBC, Urine 11-20 (A) 1-5, NONE /HPF    RBC, Urine NONE NONE, 1-2, 3-5 /HPF    Mucus, Urine FEW Reference range not established. /LPF   POCT GLUCOSE   Result Value Ref Range    POCT Glucose 82 74 - 99 mg/dL   POCT GLUCOSE   Result Value Ref Range    POCT Glucose 77 74 - 99 mg/dL            PSYCHIATRIC RISK ASSESSMENT  Violence Risk Factors:  stress/destabilizers  Acute Risk of Harm to Others is  "Considered: Low  Suicide Risk Factors: none  Protective Factors: none  Acute Risk of Harm to Self is Considered: Low  - Limited assessment given that pt is currently intubated      ASSESSMENT AND PLAN  69 y.o. female with psych history of unspecified bipolar disorder and past medical history COPD, renal transplant in 2015 on cyclosporine, mycophenolate and prednisone, HTN, HLD, hypothyroid, hyperparathyroid, HFmEF (45-50%), AFib, PVD, lymphedema, who initially presented to [facility] on 6/1/24 for epigastric pain, found to have acute pancreatitis and emphysematous pyelonephritis for which she was transferred to [facility]. Patient subsequently transferred to MICU on 6/4/24 due to acute respiratory distress. Patient currently intubated and sedated. Psych consulted for med management, as pt had been on Clozapine as outpatient.      Per collateral from [facility] - last dose of Clozapine 6/2 at 21:12. Absolute neutrophil counts have been elevated since admission.     Patient currently intubated and sedated.  Attempted to see patient but she is currently undergoing echo procedure.  Following echo, patient will go for CT imaging.  Per medication history, patient has been on Clozapine 50 mg as outpatient.  As she received the most recent dose within 48 hours, and given her current ANC, it would be appropriate to resume her home dose of Clozapine.    UPDATE 6/5: clozapine not resumed related to somnolence. Now that has been >48hrs, will need to resume lower dose     IMPRESSION  Bipolar Disorder, unspecified, per hx     RECOMMENDATIONS     Safety:  - Patient does not currently meet criteria for inpatient psychiatric admission.   - To evaluate decision-making capacity, recommend use of the Capacity Evaluation Tool. Search “[facility] Capacity Evaluation under SmartText\" unless the patient has a legal guardian, in which case all decisions per the legal guardian.  - Patient does not require a 1:1 sitter from a psychiatric " perspective at this time.  - Defer to primary team decision for 1:1 sitter.  - As with all hospitalized patients, would recommend delirium precautions, as below.     Workup:  - Most recent ANC=17.11     Medications:  - ok to restart home Clozapine 25mg PO at bedtime, titrate to home dose as tolerated  - Okay to hold home Desvenlafaxine  mg for now (unable to be crushed)              - Re-assess once pt is able to tolerate PO.     Follow-up:  - Follows with psychiatrist, Dr. Cordova in [place], [place] as outpatient.     - Discussed recommendations with primary team.  - Psychiatry will continue to follow.     Thank you for allowing us to participate in the care of this patient. Please page [phone] with any questions or concerns.      Sheila Zhang MD   Psychiatry Attending

## 2024-06-05 NOTE — CONSULTS
"Nutrition Initial Assessment:   Nutrition Assessment    Reason for Assessment: Admission nursing screening    Patient is a 69 y.o. female presenting to an OSH on 6/1 for epigastric pain and dysuria.  Treated for pyelonephritis and acute pancreatitis.  Course c/b v-tach.  In light of her history of renal transplant, she was transferred to [facility] for further care.  She was initially on Bipap for support.  She is currently intubated, sedated with fentanyl.  Noted that SLP consult placed for her but unable to see her but unable to do so with current medical situation.     Past medical history includes COPD, renal transplant in 2015 on cyclosporine, mycophenolate and prednisone, HTN, HLD, hypothyroid, hyperparathyroid, HFmEF (45-50%), AFib, PVD, lymphedema    Pt with an OGT in place for enteral access.      Nutrition History:  Food and Nutrient History: Pt unable to provide weight or nutrition history at visit.         Anthropometrics:  Height: 157.5 cm (5' 2.01\")   Weight: 95.4 kg (210 lb 5.1 oz)   BMI (Calculated): 38.46  IBW/kg (Dietitian Calculated): 50 kg  Percent of IBW: 191 %     Weight History:     Wt Readings from Last 15 Encounters:   06/05/24 95.4 kg (210 lb 5.1 oz)   06/02/24 97 kg (213 lb 13.5 oz)  *admitted on 6/3*   04/10/24 95.4 kg (210 lb 4.8 oz)   09/28/23 93.9 kg (207 lb)   03/22/23 93 kg (205 lb)   08/31/22 88.4 kg (194 lb 12.8 oz)   05/05/21 102 kg (225 lb 12.8 oz)     Weight Change %:     Nutrition Focused Physical Exam Findings:    Subcutaneous Fat Loss:   Orbital Fat Pads: Well nourished (slightly bulging fat pads)  Buccal Fat Pads: Well nourished (full, rounded cheeks)  Triceps: Well nourished (ample fat tissue)  Muscle Wasting:  Temporalis: Well nourished (well-defined muscle)  Pectoralis (Clavicular Region): Well nourished (clavicle not visible)  Deltoid/Trapezius: Well nourished (rounded appearance at arm, shoulder, neck)  Quadriceps: Well nourished (well developed, well " "rounded)  Gastrocnemius: Well nourished (well developed bulbous muscle)  Edema:  Edema Location: generalized non-pitting edema  Physical Findings:  Skin: Negative (However, she has a wristband on the L wrist that says \"limb watch\" and it appears very swollen and purple)    Nutrition Significant Labs:  BMP Trend:   Results from last 7 days   Lab Units 06/05/24  0325 06/04/24  1230 06/04/24  0350 06/03/24  2153   GLUCOSE mg/dL 67* 89 95 36*   CALCIUM mg/dL 8.5* 9.1 9.3 9.5   SODIUM mmol/L 144 144 142 146*   POTASSIUM mmol/L 4.3 5.5* 3.4* 4.2   CO2 mmol/L 25 28 28 27   CHLORIDE mmol/L 109* 108* 109* 109*   BUN mg/dL 24* 25* 26* 30*   CREATININE mg/dL 1.45* 1.37* 1.42* 1.70*    , A1C:  Lab Results   Component Value Date    HGBA1C 5.6 12/27/2022   , BG POCT trend:   Results from last 7 days   Lab Units 06/05/24  0756 06/05/24  0558 06/05/24  0342 06/04/24  2342 06/04/24  2217   POCT GLUCOSE mg/dL 111* 78 72* 91 102*    , Renal Lab Trend:   Results from last 7 days   Lab Units 06/05/24  0325 06/04/24  1230 06/04/24  0350 06/03/24  2153   POTASSIUM mmol/L 4.3 5.5* 3.4* 4.2   PHOSPHORUS mg/dL  --  2.0*  --   --    SODIUM mmol/L 144 144 142 146*   MAGNESIUM mg/dL 1.96 2.35 2.35 1.33*   EGFR mL/min/1.73m*2 39* 42* 40* 33*   BUN mg/dL 24* 25* 26* 30*   CREATININE mg/dL 1.45* 1.37* 1.42* 1.70*    , Vit D:   Lab Results   Component Value Date    VITD25 35 06/04/2024        Nutrition Specific Medications:  Scheduled medications  acyclovir, 10 mg/kg (Ideal), intravenous, q12h  cycloSPORINE, 100 mg, oral, q12h MARTIN  fentaNYL, 25 mcg, intravenous, Once  fentaNYL PF, 100 mcg, intravenous, Once  ipratropium-albuteroL, 3 mL, nebulization, q6h  levothyroxine, 50 mcg, oral, q AM  meropenem, 1 g, intravenous, q12h  midazolam, 4 mg, intravenous, Once  mycophenolate, 500 mg, orogastric tube, BID  oxygen, , inhalation, Continuous - Inhalation  polyethylene glycol, 17 g, oral, BID  predniSONE, 5 mg, oral, Daily  sennosides-docusate sodium, 2 " tablet, oral, BID  simvastatin, 40 mg, oral, Nightly      Continuous medications  dexmedeTOMIDine, 0.2-1.5 mcg/kg/hr, Last Rate: Stopped (06/05/24 0000)  fentaNYL, 0-300 mcg/hr, Last Rate: Stopped (06/05/24 0909)  [Held by provider] heparin, 0-4,500 Units/hr, Last Rate: Stopped (06/04/24 1852)      PRN medications  PRN medications: dextrose, dextrose, dextrose, dextrose, fentaNYL, glucagon, glucagon, glucagon, glucagon, heparin, vancomycin     I/O: No BM for 2 days while admitted   ;        Dietary Orders (From admission, onward)       Start     Ordered    06/04/24 1223  NPO Diet; Effective now  Diet effective now         06/04/24 1222                     Estimated Needs:   Total Energy Estimated Needs (kCal):  (7522-6960 while intubated; 9167-4505 once extubated)  Method for Estimating Needs: MV= 6.5, RMR= 1602; MSJ= 1637  Total Protein Estimated Needs (g):  (65-75)  Method for Estimating Needs: 1.3-1.5 x 50kg              Nutrition Diagnosis        Nutrition Diagnosis  Patient has Nutrition Diagnosis: Yes  Diagnosis Status (1): New  Nutrition Diagnosis 1: Inadequate protein energy intake  Related to (1): respiratory failure with need for Bipap/vent  As Evidenced by (1): NPO for 2 days this admit and may have been NPO since 6/1 at OSH       Nutrition Interventions/Recommendations         Nutrition Prescription:   For tube feed while intubated: Osmolite at start rate of 15mls/hr.  Increase by 10mls q6hrs until goal rate of 45mls/hr reached.  Run over 22hrs daily, holding around synthroid dose.  Also order one packet of ProStat AWC once daily.  Water flushes per team's discretion.  Goal rate provides 750mls free water daily.   For TF as needed once extubated if fails SLP eval: Osmolite 1.5 at 50mls/hr over 22hrs daily.  Stop Prostat if it has been ordered for while intubated.           Nutrition Interventions:    TF at goal while intubated: 1585kcals, 79grams protein  TF at goal once extubated: 1650kcals, 69grams  protein         Nutrition Education:   Not appropriate       Nutrition Monitoring and Evaluation   Food/Nutrient Related History Monitoring  Monitoring and Evaluation Plan: Enteral and parenteral nutrition intake  Criteria: TF to met 100% of her nutrition needs         Time Spent/Follow-up Reminder:   Time Spent (min): 60 minutes  Last Date of Nutrition Visit: 06/05/24  Nutrition Follow-Up Needed?: Dietitian to reassess per policy  Follow up Comment: TF via OGT

## 2024-06-06 LAB
1,25(OH)2D3 SERPL-MCNC: 21.9 PG/ML (ref 19.9–79.3)
ALBUMIN SERPL BCP-MCNC: 2.3 G/DL (ref 3.4–5)
ALP SERPL-CCNC: 120 U/L (ref 33–136)
ALT SERPL W P-5'-P-CCNC: 55 U/L (ref 7–45)
ANION GAP SERPL CALC-SCNC: 11 MMOL/L (ref 10–20)
AST SERPL W P-5'-P-CCNC: 168 U/L (ref 9–39)
BACTERIA UR CULT: NO GROWTH
BASOPHILS # BLD AUTO: 0.02 X10*3/UL (ref 0–0.1)
BASOPHILS # BLD AUTO: 0.03 X10*3/UL (ref 0–0.1)
BASOPHILS NFR BLD AUTO: 0.2 %
BASOPHILS NFR BLD AUTO: 0.2 %
BILIRUB SERPL-MCNC: 1.2 MG/DL (ref 0–1.2)
BUN SERPL-MCNC: 32 MG/DL (ref 6–23)
CALCIUM SERPL-MCNC: 8.2 MG/DL (ref 8.6–10.6)
CHLORIDE SERPL-SCNC: 108 MMOL/L (ref 98–107)
CO2 SERPL-SCNC: 28 MMOL/L (ref 21–32)
CREAT SERPL-MCNC: 1.68 MG/DL (ref 0.5–1.05)
EGFRCR SERPLBLD CKD-EPI 2021: 33 ML/MIN/1.73M*2
EOSINOPHIL # BLD AUTO: 0.15 X10*3/UL (ref 0–0.7)
EOSINOPHIL # BLD AUTO: 0.16 X10*3/UL (ref 0–0.7)
EOSINOPHIL NFR BLD AUTO: 1.1 %
EOSINOPHIL NFR BLD AUTO: 1.2 %
ERYTHROCYTE [DISTWIDTH] IN BLOOD BY AUTOMATED COUNT: 16.7 % (ref 11.5–14.5)
ERYTHROCYTE [DISTWIDTH] IN BLOOD BY AUTOMATED COUNT: 17.1 % (ref 11.5–14.5)
ERYTHROCYTE [DISTWIDTH] IN BLOOD BY AUTOMATED COUNT: 17.2 % (ref 11.5–14.5)
GLUCOSE BLD MANUAL STRIP-MCNC: 101 MG/DL (ref 74–99)
GLUCOSE BLD MANUAL STRIP-MCNC: 130 MG/DL (ref 74–99)
GLUCOSE BLD MANUAL STRIP-MCNC: 57 MG/DL (ref 74–99)
GLUCOSE BLD MANUAL STRIP-MCNC: 70 MG/DL (ref 74–99)
GLUCOSE BLD MANUAL STRIP-MCNC: 82 MG/DL (ref 74–99)
GLUCOSE BLD MANUAL STRIP-MCNC: 97 MG/DL (ref 74–99)
GLUCOSE BLD MANUAL STRIP-MCNC: 97 MG/DL (ref 74–99)
GLUCOSE SERPL-MCNC: 67 MG/DL (ref 74–99)
HCT VFR BLD AUTO: 35.2 % (ref 36–46)
HCT VFR BLD AUTO: 35.8 % (ref 36–46)
HCT VFR BLD AUTO: 38.9 % (ref 36–46)
HGB BLD-MCNC: 11.6 G/DL (ref 12–16)
HGB BLD-MCNC: 11.7 G/DL (ref 12–16)
HGB BLD-MCNC: 12.1 G/DL (ref 12–16)
HOLD SPECIMEN: NORMAL
IMM GRANULOCYTES # BLD AUTO: 0.18 X10*3/UL (ref 0–0.7)
IMM GRANULOCYTES # BLD AUTO: 0.18 X10*3/UL (ref 0–0.7)
IMM GRANULOCYTES NFR BLD AUTO: 1.2 % (ref 0–0.9)
IMM GRANULOCYTES NFR BLD AUTO: 1.4 % (ref 0–0.9)
LYMPHOCYTES # BLD AUTO: 0.5 X10*3/UL (ref 1.2–4.8)
LYMPHOCYTES # BLD AUTO: 0.53 X10*3/UL (ref 1.2–4.8)
LYMPHOCYTES NFR BLD AUTO: 3.5 %
LYMPHOCYTES NFR BLD AUTO: 4.1 %
MAGNESIUM SERPL-MCNC: 2.07 MG/DL (ref 1.6–2.4)
MCH RBC QN AUTO: 35.1 PG (ref 26–34)
MCH RBC QN AUTO: 35.2 PG (ref 26–34)
MCH RBC QN AUTO: 36 PG (ref 26–34)
MCHC RBC AUTO-ENTMCNC: 31.1 G/DL (ref 32–36)
MCHC RBC AUTO-ENTMCNC: 32.7 G/DL (ref 32–36)
MCHC RBC AUTO-ENTMCNC: 33 G/DL (ref 32–36)
MCV RBC AUTO: 108 FL (ref 80–100)
MCV RBC AUTO: 109 FL (ref 80–100)
MCV RBC AUTO: 113 FL (ref 80–100)
MICROORGANISM SPEC CULT: NORMAL
MICROORGANISM SPEC CULT: NORMAL
MONOCYTES # BLD AUTO: 0.81 X10*3/UL (ref 0.1–1)
MONOCYTES # BLD AUTO: 1.12 X10*3/UL (ref 0.1–1)
MONOCYTES NFR BLD AUTO: 6.2 %
MONOCYTES NFR BLD AUTO: 7.8 %
NEUTROPHILS # BLD AUTO: 11.32 X10*3/UL (ref 1.2–7.7)
NEUTROPHILS # BLD AUTO: 12.43 X10*3/UL (ref 1.2–7.7)
NEUTROPHILS NFR BLD AUTO: 86.2 %
NEUTROPHILS NFR BLD AUTO: 86.9 %
NRBC BLD-RTO: 0 /100 WBCS (ref 0–0)
PLATELET # BLD AUTO: 135 X10*3/UL (ref 150–450)
PLATELET # BLD AUTO: 99 X10*3/UL (ref 150–450)
PLATELET # BLD AUTO: 99 X10*3/UL (ref 150–450)
POTASSIUM SERPL-SCNC: 5.2 MMOL/L (ref 3.5–5.3)
PROT SERPL-MCNC: 4.2 G/DL (ref 6.4–8.2)
PTH-INTACT SERPL-MCNC: 204.2 PG/ML (ref 18.5–88)
RBC # BLD AUTO: 3.22 X10*6/UL (ref 4–5.2)
RBC # BLD AUTO: 3.32 X10*6/UL (ref 4–5.2)
RBC # BLD AUTO: 3.45 X10*6/UL (ref 4–5.2)
SERVICE CMNT-IMP: NORMAL
SERVICE CMNT-IMP: NORMAL
SODIUM SERPL-SCNC: 142 MMOL/L (ref 136–145)
SPECIMEN DESCRIPTION: NORMAL
SPECIMEN DESCRIPTION: NORMAL
UFH PPP CHRO-ACNC: 0.7 IU/ML
UFH PPP CHRO-ACNC: 0.9 IU/ML
UFH PPP CHRO-ACNC: 1 IU/ML
VANCOMYCIN SERPL-MCNC: 18.8 UG/ML (ref 5–20)
WBC # BLD AUTO: 13 X10*3/UL (ref 4.4–11.3)
WBC # BLD AUTO: 13 X10*3/UL (ref 4.4–11.3)
WBC # BLD AUTO: 14.4 X10*3/UL (ref 4.4–11.3)

## 2024-06-06 PROCEDURE — 83735 ASSAY OF MAGNESIUM: CPT

## 2024-06-06 PROCEDURE — 87799 DETECT AGENT NOS DNA QUANT: CPT

## 2024-06-06 PROCEDURE — 2500000004 HC RX 250 GENERAL PHARMACY W/ HCPCS (ALT 636 FOR OP/ED): Performed by: STUDENT IN AN ORGANIZED HEALTH CARE EDUCATION/TRAINING PROGRAM

## 2024-06-06 PROCEDURE — 36415 COLL VENOUS BLD VENIPUNCTURE: CPT

## 2024-06-06 PROCEDURE — 2500000001 HC RX 250 WO HCPCS SELF ADMINISTERED DRUGS (ALT 637 FOR MEDICARE OP)

## 2024-06-06 PROCEDURE — 97166 OT EVAL MOD COMPLEX 45 MIN: CPT | Mod: GO

## 2024-06-06 PROCEDURE — 80202 ASSAY OF VANCOMYCIN: CPT | Performed by: STUDENT IN AN ORGANIZED HEALTH CARE EDUCATION/TRAINING PROGRAM

## 2024-06-06 PROCEDURE — 2500000002 HC RX 250 W HCPCS SELF ADMINISTERED DRUGS (ALT 637 FOR MEDICARE OP, ALT 636 FOR OP/ED): Performed by: STUDENT IN AN ORGANIZED HEALTH CARE EDUCATION/TRAINING PROGRAM

## 2024-06-06 PROCEDURE — 80053 COMPREHEN METABOLIC PANEL: CPT

## 2024-06-06 PROCEDURE — S0136 CLOZAPINE, 25 MG: HCPCS

## 2024-06-06 PROCEDURE — 2500000004 HC RX 250 GENERAL PHARMACY W/ HCPCS (ALT 636 FOR OP/ED)

## 2024-06-06 PROCEDURE — 83970 ASSAY OF PARATHORMONE: CPT

## 2024-06-06 PROCEDURE — 99231 SBSQ HOSP IP/OBS SF/LOW 25: CPT | Performed by: STUDENT IN AN ORGANIZED HEALTH CARE EDUCATION/TRAINING PROGRAM

## 2024-06-06 PROCEDURE — 85025 COMPLETE CBC W/AUTO DIFF WBC: CPT | Mod: 91

## 2024-06-06 PROCEDURE — 2500000001 HC RX 250 WO HCPCS SELF ADMINISTERED DRUGS (ALT 637 FOR MEDICARE OP): Performed by: STUDENT IN AN ORGANIZED HEALTH CARE EDUCATION/TRAINING PROGRAM

## 2024-06-06 PROCEDURE — 85520 HEPARIN ASSAY: CPT

## 2024-06-06 PROCEDURE — 82947 ASSAY GLUCOSE BLOOD QUANT: CPT | Mod: 91

## 2024-06-06 PROCEDURE — 2500000005 HC RX 250 GENERAL PHARMACY W/O HCPCS

## 2024-06-06 PROCEDURE — 97162 PT EVAL MOD COMPLEX 30 MIN: CPT | Mod: GP

## 2024-06-06 PROCEDURE — 85025 COMPLETE CBC W/AUTO DIFF WBC: CPT

## 2024-06-06 PROCEDURE — 1200000002 HC GENERAL ROOM WITH TELEMETRY DAILY

## 2024-06-06 PROCEDURE — 2500000002 HC RX 250 W HCPCS SELF ADMINISTERED DRUGS (ALT 637 FOR MEDICARE OP, ALT 636 FOR OP/ED)

## 2024-06-06 RX ORDER — POLYETHYLENE GLYCOL 3350 17 G/17G
17 POWDER, FOR SOLUTION ORAL DAILY
Status: DISCONTINUED | OUTPATIENT
Start: 2024-06-07 | End: 2024-06-15 | Stop reason: HOSPADM

## 2024-06-06 RX ORDER — CARVEDILOL 6.25 MG/1
6.25 TABLET ORAL 2 TIMES DAILY
Status: DISCONTINUED | OUTPATIENT
Start: 2024-06-06 | End: 2024-06-14

## 2024-06-06 RX ORDER — METOPROLOL TARTRATE 25 MG/1
12.5 TABLET, FILM COATED ORAL 2 TIMES DAILY
Status: DISCONTINUED | OUTPATIENT
Start: 2024-06-06 | End: 2024-06-06

## 2024-06-06 RX ORDER — DESVENLAFAXINE 100 MG/1
100 TABLET, EXTENDED RELEASE ORAL DAILY
Status: DISCONTINUED | OUTPATIENT
Start: 2024-06-06 | End: 2024-06-15 | Stop reason: HOSPADM

## 2024-06-06 RX ORDER — CLOZAPINE 25 MG/1
25 TABLET ORAL ONCE
Status: COMPLETED | OUTPATIENT
Start: 2024-06-06 | End: 2024-06-06

## 2024-06-06 RX ORDER — CLOZAPINE 50 MG/1
50 TABLET ORAL NIGHTLY
Status: DISCONTINUED | OUTPATIENT
Start: 2024-06-07 | End: 2024-06-07

## 2024-06-06 RX ORDER — DEXTROSE 50 % IN WATER (D50W) INTRAVENOUS SYRINGE
25
Status: DISCONTINUED | OUTPATIENT
Start: 2024-06-06 | End: 2024-06-15 | Stop reason: HOSPADM

## 2024-06-06 RX ORDER — VANCOMYCIN HYDROCHLORIDE 1 G/20ML
INJECTION, POWDER, LYOPHILIZED, FOR SOLUTION INTRAVENOUS DAILY PRN
Status: DISCONTINUED | OUTPATIENT
Start: 2024-06-06 | End: 2024-06-08

## 2024-06-06 RX ORDER — HEPARIN SODIUM 10000 [USP'U]/100ML
0-4500 INJECTION, SOLUTION INTRAVENOUS CONTINUOUS
Status: DISPENSED | OUTPATIENT
Start: 2024-06-06 | End: 2024-06-11

## 2024-06-06 RX ORDER — DEXTROSE 50 % IN WATER (D50W) INTRAVENOUS SYRINGE
12.5
Status: DISCONTINUED | OUTPATIENT
Start: 2024-06-06 | End: 2024-06-15 | Stop reason: HOSPADM

## 2024-06-06 RX ORDER — VANCOMYCIN HYDROCHLORIDE 500 MG/100ML
500 INJECTION, SOLUTION INTRAVENOUS ONCE
Status: COMPLETED | OUTPATIENT
Start: 2024-06-06 | End: 2024-06-06

## 2024-06-06 RX ORDER — CLOZAPINE 25 MG/1
25 TABLET ORAL NIGHTLY
Status: DISCONTINUED | OUTPATIENT
Start: 2024-06-06 | End: 2024-06-06

## 2024-06-06 RX ADMIN — MEROPENEM 1 G: 1 INJECTION, POWDER, FOR SOLUTION INTRAVENOUS at 10:42

## 2024-06-06 RX ADMIN — SENNOSIDES AND DOCUSATE SODIUM 2 TABLET: 50; 8.6 TABLET ORAL at 09:12

## 2024-06-06 RX ADMIN — CARVEDILOL 6.25 MG: 6.25 TABLET, FILM COATED ORAL at 09:12

## 2024-06-06 RX ADMIN — CYCLOSPORINE 100 MG: 100 SOLUTION ORAL at 05:35

## 2024-06-06 RX ADMIN — PREDNISONE 5 MG: 5 TABLET ORAL at 09:11

## 2024-06-06 RX ADMIN — MYCOPHENOLATE MOFETIL 500 MG: 200 POWDER, FOR SUSPENSION ORAL at 09:12

## 2024-06-06 RX ADMIN — POLYETHYLENE GLYCOL 3350 17 G: 17 POWDER, FOR SOLUTION ORAL at 09:11

## 2024-06-06 RX ADMIN — VANCOMYCIN HYDROCHLORIDE 500 MG: 500 INJECTION, SOLUTION INTRAVENOUS at 12:08

## 2024-06-06 RX ADMIN — DEXTROSE MONOHYDRATE 12.5 G: 25 INJECTION, SOLUTION INTRAVENOUS at 05:04

## 2024-06-06 RX ADMIN — LEVOTHYROXINE SODIUM 50 MCG: 0.05 TABLET ORAL at 05:36

## 2024-06-06 RX ADMIN — DESVENLAFAXINE 100 MG: 100 TABLET, EXTENDED RELEASE ORAL at 09:12

## 2024-06-06 RX ADMIN — CLOZAPINE 25 MG: 25 TABLET ORAL at 10:10

## 2024-06-06 ASSESSMENT — COGNITIVE AND FUNCTIONAL STATUS - GENERAL
MOVING FROM LYING ON BACK TO SITTING ON SIDE OF FLAT BED WITH BEDRAILS: A LOT
TURNING FROM BACK TO SIDE WHILE IN FLAT BAD: A LOT
DRESSING REGULAR LOWER BODY CLOTHING: TOTAL
PERSONAL GROOMING: A LITTLE
MOVING TO AND FROM BED TO CHAIR: TOTAL
HELP NEEDED FOR BATHING: A LOT
DRESSING REGULAR UPPER BODY CLOTHING: A LITTLE
DAILY ACTIVITIY SCORE: 14
WALKING IN HOSPITAL ROOM: TOTAL
MOBILITY SCORE: 8
EATING MEALS: A LITTLE
TOILETING: A LOT
STANDING UP FROM CHAIR USING ARMS: TOTAL
CLIMB 3 TO 5 STEPS WITH RAILING: TOTAL

## 2024-06-06 ASSESSMENT — PAIN SCALES - GENERAL
PAINLEVEL_OUTOF10: 0 - NO PAIN
PAINLEVEL_OUTOF10: 5 - MODERATE PAIN
PAINLEVEL_OUTOF10: 0 - NO PAIN
PAINLEVEL_OUTOF10: 5 - MODERATE PAIN
PAINLEVEL_OUTOF10: 0 - NO PAIN

## 2024-06-06 ASSESSMENT — PAIN - FUNCTIONAL ASSESSMENT
PAIN_FUNCTIONAL_ASSESSMENT: 0-10

## 2024-06-06 ASSESSMENT — ACTIVITIES OF DAILY LIVING (ADL)
ADL_ASSISTANCE: INDEPENDENT
ADL_ASSISTANCE: INDEPENDENT
BATHING_ASSISTANCE: MAXIMAL

## 2024-06-06 NOTE — PROGRESS NOTES
"Lea Paulino is a 69 y.o. female on day 3 of admission presenting with Pyelonephritis.    Subjective   Patient known to our service. Transferred to MICU on 6/##, now stable being transferred to floor.    MICU Course:  \"Patient began to be in acute respiratory distress with a VBG showing a pH of 7.23, CO2 66 and a lactate of 1.  Patient was placed on BiPAP and was admitted to the ICU.  X-ray chest done yesterday 6/3 showed right-sided pleural effusion with associated atelectasis/consolidation.    On arrival to the ICU patient was tolerating BiPAP.  Patient was arousable.  Repeat VBG 7.20/73. Due to expected clinical course patient was intubated. Suspicion for Meningitis in setting of AMS and recent epidural injection, CT head and spine obtained with possibility of LP. CT C/A/P ordered as well for suspicion of worsening infection or stool burden causing AMS/ respiratory distress. Imaging was negative for worsening processes. Lumbar puncture to assess for possible meningitis was considered. Blood cultures negative. Urine culture negative. Currently on meropenem and Vancomycin. Acyclovir added for meningitis coverage but discontinued due to low concern for meningitis. Clozapine restarted at 25 mg per psych recommendation. Patient was extubated and AXOX4. \"    Seen in MICU, patient states she is feeling well. Oriented to self and year, but not location. Does not demonstrate understanding of current medical condition. She denies HA, CP, palpitations, SOB, abdominal pain, LE swelling. Currently on 2.5 L NC with heparin gtt running.     Objective     Physical Exam  Constitutional: NAD, pleasant  HEENT: EOMI, no scleral icterus, MMM  CV: RRR, murmur heard best at right upper sternal border   Pulm: CTAB, no increased WOB  GI: Soft, NT, ND  Extremities: 1+ pitting edema to knees bilaterally  Skin: warm  Neuro: Oriented to self and year, but not location.   Psych: Mood and affect appropriate to situation     Last " "Recorded Vitals  Blood pressure 135/54, pulse 93, temperature 36.2 °C (97.2 °F), temperature source Temporal, resp. rate 24, height 1.575 m (5' 2.01\"), weight 97.8 kg (215 lb 9.8 oz), SpO2 97%.  Intake/Output last 3 Shifts:  I/O last 3 completed shifts:  In: 2296.8 (23.5 mL/kg) [P.O.:400; I.V.:76.8 (0.8 mL/kg); NG/GT:350; IV Piggyback:1470]  Out: 825 (8.4 mL/kg) [Urine:825 (0.2 mL/kg/hr)]  Weight: 97.8 kg     Relevant Results  Results for orders placed or performed during the hospital encounter of 06/03/24 (from the past 24 hour(s))   POCT GLUCOSE   Result Value Ref Range    POCT Glucose 82 74 - 99 mg/dL   POCT GLUCOSE   Result Value Ref Range    POCT Glucose 77 74 - 99 mg/dL   CBC   Result Value Ref Range    WBC 13.0 (H) 4.4 - 11.3 x10*3/uL    nRBC 0.0 0.0 - 0.0 /100 WBCs    RBC 3.22 (L) 4.00 - 5.20 x10*6/uL    Hemoglobin 11.6 (L) 12.0 - 16.0 g/dL    Hematocrit 35.2 (L) 36.0 - 46.0 %     (H) 80 - 100 fL    MCH 36.0 (H) 26.0 - 34.0 pg    MCHC 33.0 32.0 - 36.0 g/dL    RDW 17.1 (H) 11.5 - 14.5 %    Platelets 99 (L) 150 - 450 x10*3/uL   POCT GLUCOSE   Result Value Ref Range    POCT Glucose 76 74 - 99 mg/dL   Comprehensive metabolic panel   Result Value Ref Range    Glucose 67 (L) 74 - 99 mg/dL    Sodium 142 136 - 145 mmol/L    Potassium 5.2 3.5 - 5.3 mmol/L    Chloride 108 (H) 98 - 107 mmol/L    Bicarbonate 28 21 - 32 mmol/L    Anion Gap 11 10 - 20 mmol/L    Urea Nitrogen 32 (H) 6 - 23 mg/dL    Creatinine 1.68 (H) 0.50 - 1.05 mg/dL    eGFR 33 (L) >60 mL/min/1.73m*2    Calcium 8.2 (L) 8.6 - 10.6 mg/dL    Albumin 2.3 (L) 3.4 - 5.0 g/dL    Alkaline Phosphatase 120 33 - 136 U/L    Total Protein 4.2 (L) 6.4 - 8.2 g/dL     (H) 9 - 39 U/L    Bilirubin, Total 1.2 0.0 - 1.2 mg/dL    ALT 55 (H) 7 - 45 U/L   Magnesium   Result Value Ref Range    Magnesium 2.07 1.60 - 2.40 mg/dL   CBC and Auto Differential   Result Value Ref Range    WBC 13.0 (H) 4.4 - 11.3 x10*3/uL    nRBC 0.0 0.0 - 0.0 /100 WBCs    RBC 3.32 (L) " 4.00 - 5.20 x10*6/uL    Hemoglobin 11.7 (L) 12.0 - 16.0 g/dL    Hematocrit 35.8 (L) 36.0 - 46.0 %     (H) 80 - 100 fL    MCH 35.2 (H) 26.0 - 34.0 pg    MCHC 32.7 32.0 - 36.0 g/dL    RDW 16.7 (H) 11.5 - 14.5 %    Platelets 99 (L) 150 - 450 x10*3/uL    Neutrophils % 86.9 40.0 - 80.0 %    Immature Granulocytes %, Automated 1.4 (H) 0.0 - 0.9 %    Lymphocytes % 4.1 13.0 - 44.0 %    Monocytes % 6.2 2.0 - 10.0 %    Eosinophils % 1.2 0.0 - 6.0 %    Basophils % 0.2 0.0 - 2.0 %    Neutrophils Absolute 11.32 (H) 1.20 - 7.70 x10*3/uL    Immature Granulocytes Absolute, Automated 0.18 0.00 - 0.70 x10*3/uL    Lymphocytes Absolute 0.53 (L) 1.20 - 4.80 x10*3/uL    Monocytes Absolute 0.81 0.10 - 1.00 x10*3/uL    Eosinophils Absolute 0.15 0.00 - 0.70 x10*3/uL    Basophils Absolute 0.02 0.00 - 0.10 x10*3/uL   Vancomycin   Result Value Ref Range    Vancomycin 18.8 5.0 - 20.0 ug/mL   Heparin Assay, UFH   Result Value Ref Range    Heparin Unfractionated 0.9 See Comment Below for Therapeutic Ranges IU/mL   POCT GLUCOSE   Result Value Ref Range    POCT Glucose 70 (L) 74 - 99 mg/dL   POCT GLUCOSE   Result Value Ref Range    POCT Glucose 57 (L) 74 - 99 mg/dL   POCT GLUCOSE   Result Value Ref Range    POCT Glucose 101 (H) 74 - 99 mg/dL   Heparin Assay, UFH   Result Value Ref Range    Heparin Unfractionated 0.9 See Comment Below for Therapeutic Ranges IU/mL   Extra Urine Gray Tube   Result Value Ref Range    Extra Tube Hold for add-ons.    POCT GLUCOSE   Result Value Ref Range    POCT Glucose 97 74 - 99 mg/dL   PTH, Intact   Result Value Ref Range    Parathyroid Hormone, Intact 204.2 (H) 18.5 - 88.0 pg/mL   CBC and Auto Differential   Result Value Ref Range    WBC 14.4 (H) 4.4 - 11.3 x10*3/uL    nRBC 0.0 0.0 - 0.0 /100 WBCs    RBC 3.45 (L) 4.00 - 5.20 x10*6/uL    Hemoglobin 12.1 12.0 - 16.0 g/dL    Hematocrit 38.9 36.0 - 46.0 %     (H) 80 - 100 fL    MCH 35.1 (H) 26.0 - 34.0 pg    MCHC 31.1 (L) 32.0 - 36.0 g/dL    RDW 17.2 (H)  11.5 - 14.5 %    Platelets 135 (L) 150 - 450 x10*3/uL    Neutrophils % 86.2 40.0 - 80.0 %    Immature Granulocytes %, Automated 1.2 (H) 0.0 - 0.9 %    Lymphocytes % 3.5 13.0 - 44.0 %    Monocytes % 7.8 2.0 - 10.0 %    Eosinophils % 1.1 0.0 - 6.0 %    Basophils % 0.2 0.0 - 2.0 %    Neutrophils Absolute 12.43 (H) 1.20 - 7.70 x10*3/uL    Immature Granulocytes Absolute, Automated 0.18 0.00 - 0.70 x10*3/uL    Lymphocytes Absolute 0.50 (L) 1.20 - 4.80 x10*3/uL    Monocytes Absolute 1.12 (H) 0.10 - 1.00 x10*3/uL    Eosinophils Absolute 0.16 0.00 - 0.70 x10*3/uL    Basophils Absolute 0.03 0.00 - 0.10 x10*3/uL   Heparin Assay, UFH   Result Value Ref Range    Heparin Unfractionated 0.7 See Comment Below for Therapeutic Ranges IU/mL   POCT GLUCOSE   Result Value Ref Range    POCT Glucose 82 74 - 99 mg/dL   Heparin Assay, UFH   Result Value Ref Range    Heparin Unfractionated 0.9 See Comment Below for Therapeutic Ranges IU/mL   POCT GLUCOSE   Result Value Ref Range    POCT Glucose 130 (H) 74 - 99 mg/dL        Assessment/Plan   Principal Problem:    Pyelonephritis    Lea Paulino is a 69 y.o. female with a history of COPD, renal transplant in 2015 on cyclosporine, mycophenolate and prednisone, HTN, HLD, hypothyroid, hyperparathyroid, HFmEF (45-50%), AFib, PVD, lymphedema, admitted for emphysematous pyelonephritis and acute pancreatitis in the setting of severe hypercalcemia. Initially treated with IV fluids and abx. Transferred to MICU 6/4 for AHRF s/t pulmonary edema and intubated 6/4-6/5 for airway protection. Now stable on 2.5L NC and being transferred back to floor. Transplant ID and nephrology following patient.    Updates:  -wound care consult for LE wounds  -LE duplex for bilateral pitting edema and pain  -ordered EBV, CMV and BK viral loads    #Hypercalcemia, improving  :: ddx primary hyperparathyroidism w/ remaining parathyroid tissue vs less likely MEN1 given possible recurrent, significant hypoglycemia  concerning for insulinoma  :: s/p subtotal hemithyroidectomy & parathyroidectomy in 2006   :: Calcium 15.9 at [facility]  :: Potentially the culprit for her presentation of AMS/confusion, pancreatitis, vomiting, large stool burden and constipation, and kidney stones  :: Given severity, was treated at [facility] with [place] calcitonin 4 units/kg subcutaneously and high-volume IV normal saline   - iPTH appropriately low on admission, repeat elevated at 204  -Vitamin D 25 and D-1,25 levels WNL, pending PTHrp and SPEP, UPEP   -daily ical, phos and mag levels   -hold home calcitriol     #Emphysematous pyelonephritis  :: CT A/P with gas in the RLQ transplant kidney collecting system and in the urinary bladder, possible gas-forming infection  :: s/p vanc/zosyn and meropenem/gentamicin at OSH  :: previously grew pan-sensitive Pseudomonas (4/24) and E. faecalis (resistant to gentamicin, intermediate to doxycycline 1/22)  -Bcx 6/3 NGTD  -Ucx 6/3 and 6/6 NGTD  -Transplant ID consulted, appreciate recs:  -c/w meropenem/vanc  -consider urology c/s for nephrostomy tube if becomes hemodynamically unstable    #Hypogylcemia  ::Concern for adrenal insufficiency while on steroids vs s/t sepsis vs less likely insulinoma iso MEN1  -TSH nl  -AM cortisol 29.2 (H)  -C-peptide elevated at 5.2, proinsulin pending  -q4 blood sugar checks  -hypoglycemia protocol    #Acute pancreatitis  :: likely secondary to hypercalcemia (15.9) vs. medications including cyclosporine (less likely)  :: Lipase 1039, CT findings supportive, low back pain initial presentation    - triglycerides obtained 82  - Pain control with IV medication  - Follow-up random cyclosporine levels  - Strict I/O  - can repeat CT in a few weeks regarding fluid collections    #LAURA on CKD of transplanted kidney (2015), improving  :: Follows with Dr Gay at  for renal transplant   :: On mycophenolate, cyclosporine, prednisone  :: Creatinine on admission to [facility]  2.7 (baseline 1.5-1.7)  :: LAURA likely prerenal in setting of pancreatitis and hypovolemia  -Transplant nephrology following  -continue cyclosporine 100 BID, prednisone 5 mg daily, decrease MMF to 500 BID (home dose 1 g daily)  -send EBV/CMV and BK viral loads    #AHRF s/t pulmonary edema, resolved  #COPD  -intubated in MICU 6/4-6/4, s/t excessive fluid resuscitation for pancreatitis  -on 2.5 L NC  -c/w home inhalers    #Reported nonsustained VT at [facility]  #Hypertension  #HFmrEF  - home metoprolol tartrate 12.5 BID switched to coreg 6.25 mg BID  - Optimize electrolytes K > 4, Mg > 2  - EKG 6/4 -> NSR, Left axis deviation  - Holding Bumex 1 mg for now  - Diurese prn   - Echo- EF 40-45%     #Bipolar Disorder  -Psych consulted   -resume home desvenlafaxine 100mg  -start home clozapine at 25 mg at night, starting 6/7 increase to home dose of 50 mg     #Afib  #Hx of DVT LLE 2022  -c/w coreg 6.25 mg BID  -holding home eliquis, on heparin gtt     #Hypothyroidism  - Continue levothyroxine    F: caution given pulmonary edema  E: replete prn, monitor Ca  N: regular  A: PIV  DVT ppx: heparin gtt for afib    Code status: full  NOK: Son  [phone]     Opal Chanel MD

## 2024-06-06 NOTE — SIGNIFICANT EVENT
ICU to [facility] Transfer Summary     I:  ICU Admission Reason & Brief ICU Course:    Lea Paulino is a 69 y.o. female PMHx COPD, renal transplant in 2015 on cyclosporine, mycophenolate and prednisone, HTN, HLD, hypothyroid, hyperparathyroid, HFmEF (45-50%), AFib, PVD, lymphedema, 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, who was transferred to the MICU on 6/4 for AMS and acute hypercapenic respiratory failure placed on BiPAP and later intubated for worsening respiratory failure. She was extubated to 3L NC around noon on 6/5 and now stable for transfer to the floor.         C: Code Status/DPOA Info/Goals of Care/ACP Note    Full Code  DPOA/Contact Number: hu [phone]     U: Unprescribing & Pertinent High-Risk Medications    Changes to home meds:  held bumex 1g daily, mmf dec to 500 bid (previous home dose 1g BID), apixaban (held while on heparin gtt), clozapine     Anticoagulation: Yes - SCDs, heparin gtt     Antibiotics:   [] N/A - no current planned antimicrobioals  [x]  Meropenem, vancomycin for emphysematous pyelonephritis. ID consulted for abx management, stop date tbd     P: Pending Tests at the Time of Transfer   None at this time. Previously considering LP, however, with improvement in mental status, we are no longer planning for this.       A: Active consultants, including Rehab:   [x]  Subspecialty Consultants: infectious disease, nephrology, psych  [x]  PT  [x]  OT  []  SLP  []  Wound Care    U: Uncertainty Measure/Diagnostic Pause:    Working diagnosis at the time of transfer acute hypercapneic respiratory failure in setting of metabolic encephalopathy from sepsis, hypoglycemia.     Diagnosis Degree of Certainty: 1. High degree of certainty about the clinical diagnosis.     S: Summary of Major Problems and To-Dos:   Neuro:  #Altered mental status, resolved   - hypoglycemic on floor- received D50, 97 in MICU  - Hypercapnia-> VBG pH 7.2/73 -> intubated   -no  longer concerned for meningitis given improvement in mental status  - head CT negative for acute ischemia, bleed, mass  #psychiatric   - restarting home desvenlafaxine 100mg  - per psych ok to restart clozapine, however, their note mentions will need to slowly uptitrate. Will need to clarify with them at what dosage to start      Cardiovascular:  #Reported nonsustained VT at [facility]  #Hypertension  #HFmrEF  - home metoprolol tartrate 12.5 BID restarted   - Optimize electrolytes K > 4, Mg > 2  - EKG 6/4 -> NSR, Left axis deviation  - Holding Bumex 1 mg for now  - Diurese prn   - Echo- EF 40-45%      #Hx AFib  -Rate controlled with beta blocker  -Heparin infusion resumed; initially held for possible LP  #Hx of DVT LLE 2022  ::holding home Eliquis  -Heparin infusion resumed; initially held for possible LP     Pulmonology:  #COPD  - Continue inhaled treatments  #acute hypercapneic resp failure    ::Likely 2/2 to sepsis, obesity, COPD, possible RML atelectasis on xray  ::Patient is compensating for respiratory acidosis  -Intubated 6/4-6/5. Now on 3L NC     Gastrointestinal:  #Acute pancreatitis  :: Possibly secondary to hypercalcemia (15.9) vs. medications including cyclosporine (less likely)  :: Lipase 1039, CT findings supportive, low back pain initial presentation    - triglycerides obtained 82  - Pain control with IV medication  - Follow-up random cyclosporine levels  - Strict I/O  - can repeat CT in a few weeks regarding fluid collections     Renal:  #Hypercalcemia  :: s/p subtotal hemithyroidectomy & parathyroidectomy in 2006   :: Calcium 15.9 at [facility]  :: Potentially the culprit for her presentation of AMS/confusion, pancreatitis, vomiting, large stool burden and constipation, and kidney stones  :: Suppressed intact PTH, has a recent history of elevated PTH with mildly elevated calcium consistent with primary hyperparathyroidism evaluation at that time unremarkable  :: Given severity, was treated  at [facility] with [place] calcitonin 4 units/kg subcutaneously and high-volume IV normal saline   - Follow up hypercalcemia evaluation including PTH-RP, SPEP, UPEP, 25-hydroxy vitamin D3, 1, 25 dihydroxy vitamin D3  - Bowel regimen      #LAURA on CKD  :: s/p renal transplant in 2015  :: Follows with Dr Gay at  for renal transplant   :: On mycophenolate, cyclosporine, prednisone  :: Creatinine on admission to [facility] 2.7 (baseline 1.5-1.7), current Cr 1.4  :: LAURA likely prerenal in setting of pancreatitis and hypovolemia  Nephrology consulted   - continue cyclosporine 100 BID  - decrease MMF to 500 BID  - continue prednisone 5 mg daily   - although cyclosporine can cause pancreatitis - would continue at this moment      Infectious Disease:  #Emphysematous pyelonephritis  :: CT A/P with gas in the RLQ transplant kidney collecting system and in the urinary bladder, possible gas-forming infection  :: Patient was on Vanco and Zosyn at outside hospital, reported use of meropenem and gentamicin  - Started vancomycin and meropenem 1 g every 8 hours  - Transplant ID consulted for guidance on antibiotic use  - Transplant nephrology consulted  - Follow-up infectious workup: Blood cultures, urine cultures negative    Urine Cultures:  - 1/12/2022 Enterococcus faecalis, resistant  to gentamicin, intermediate to doxycycline, sensitive to everything else   - 4/2024 Pseudomonas pan-susceptible   - 6/3/2024: negative     Endocrine:  #Hypothyroidism  - Continue levothyroxine  #Hypogylcemia  ::Concern for adrenal insufficiency while on steroids   ::Ammonia normal, TSH nl  -AM cortisol 29.2    To-do list prior to transfer:  [] Clarify with psych in the AM regarding dose of clozapine to start      E: Exam, including Lines/Drains/Airways & Data Review:   Physical Exam:  General: alert, conversant, in no apparent distress  HEENT: normocephalic, atraumatic, EOMI, no scleral icterus  CV: RRR, no murmurs  Pulm: CTAB, no  wheezes or crackles, no increased work of breathing. Wearing NC   Abd: soft, non tender, non distended  : no jimenez   Ext: warm, no lower extremity edema  Skin: no rashes  Neuro: moving all extremities  Psych: normal affect      Difficult airway? No  Lines/drains assessed for removal? Yes, describe: maintaining jimenez for accurate I/o    Within 30 minutes of the patient physically leaving the floor, a Floor Readiness Note needs to be placed with updated vitals.

## 2024-06-06 NOTE — CARE PLAN
Problem: Pain  Goal: My pain/discomfort is manageable  Outcome: Progressing     Problem: Safety  Goal: Patient will be injury free during hospitalization  Outcome: Progressing  Goal: I will remain free of falls  Outcome: Progressing     Problem: Daily Care  Goal: Daily care needs are met  Outcome: Progressing     Problem: Psychosocial Needs  Goal: Demonstrates ability to cope with hospitalization/illness  Outcome: Progressing  Goal: Collaborate with me, my family, and caregiver to identify my specific goals  Outcome: Progressing     Problem: Skin  Goal: Participates in plan/prevention/treatment measures  Outcome: Progressing  Flowsheets (Taken 6/5/2024 2004)  Participates in plan/prevention/treatment measures: Discuss with provider PT/OT consult  Goal: Prevent/manage excess moisture  Outcome: Progressing  Flowsheets (Taken 6/5/2024 2004)  Prevent/manage excess moisture: Moisturize dry skin  Goal: Prevent/minimize sheer/friction injuries  Outcome: Progressing  Flowsheets (Taken 6/5/2024 2004)  Prevent/minimize sheer/friction injuries: Complete micro-shifts as needed if patient unable. Adjust patient position to relieve pressure points, not a full turn  Goal: Promote/optimize nutrition  Outcome: Progressing  Flowsheets (Taken 6/5/2024 2004)  Promote/optimize nutrition: Assist with feeding  Goal: Promote skin healing  Outcome: Progressing  Flowsheets (Taken 6/5/2024 2004)  Promote skin healing: Assess skin/pad under line(s)/device(s)   The patient's goals for the shift include      The clinical goals for the shift include pt will remain hemodynamically stable through shift

## 2024-06-06 NOTE — PROGRESS NOTES
Lea Paulino is a 69 y.o. female on day 3 of admission presenting with Pyelonephritis.      Subjective   Per chart, pt has been extubated.   Upon assessment, pt presents awake in bed, currently eating breakfast. She endorses feeling better today, and has been able to tolerate some PO intake. She reports that her situation has been tough, but she is doing her best. Says that she has support from family and friends.     From a psych perspective, she reports that she has a hx of bipolar disorder. Had previously been on Lithium,  but transitioned to Clozapine ~20 years ago due to needing renal transplant. Endorses past hospitalization for bipolar disorder, but reports having been stable for some time on current regiment of Clozapine and Pristiq.    No suicidal ideation. Denies any past history of suicide attempts. Denies any hallucinations.        Objective     Last Recorded Vitals      6/6/2024     3:00 AM 6/6/2024     4:00 AM 6/6/2024     5:00 AM 6/6/2024     6:00 AM 6/6/2024     7:00 AM 6/6/2024     8:00 AM 6/6/2024     9:00 AM   Vitals   Systolic 158 161 175 165 177 180 169   Diastolic 64 67 76 75 79 77 69   Heart Rate 95 96 98 97 98 100 97   Temp  36.5 °C (97.7 °F)    36.4 °C (97.5 °F)    Resp 19 20 19 20 22 28 21   Weight (lb)  215.61        BMI  39.43 kg/m2        BSA (m2)  2.07 m2            Mental Status Exam  General: NAD  Appearance: 70 yo female, appears stated age, currently extubated  Attitude: cooperative, fair engagement  Behavior: good eye contact   Motor Activity: appears to have some involuntary oral movements suggestive of TD  Speech: spontaneous, normal rate, volume, and rhythm   Mood: okay  Affect: mood congruent, calm, not labile   Thought Process: linear and organized   Thought Content: No SI/HI. No delusional content.  Thought Perception: no auditory or visual hallucinations.  Cognition: awake, oriented to person, knows she is in the hospital, oriented to year  Insight: appears to be  improving   Judgement: fair in regards to cooperating with care    Scheduled medications  carvedilol, 6.25 mg, oral, BID  cycloSPORINE, 100 mg, oral, q12h MARTIN  desvenlafaxine succinate, 100 mg, oral, Daily  levothyroxine, 50 mcg, oral, q AM  meropenem, 1 g, intravenous, q12h  mycophenolate, 500 mg, orogastric tube, BID  polyethylene glycol, 17 g, oral, BID  predniSONE, 5 mg, oral, Daily  sennosides-docusate sodium, 2 tablet, oral, BID  simvastatin, 40 mg, oral, Nightly      Continuous medications  heparin, 0-4,500 Units/hr, Last Rate: Stopped (06/04/24 3212)      PRN medications  PRN medications: dextrose, dextrose, dextrose, dextrose, glucagon, glucagon, glucagon, glucagon, heparin, ipratropium-albuteroL, vancomycin    Results for orders placed or performed during the hospital encounter of 06/03/24 (from the past 24 hour(s))   POCT GLUCOSE   Result Value Ref Range    POCT Glucose 90 74 - 99 mg/dL   Urinalysis with Reflex Culture and Microscopic   Result Value Ref Range    Color, Urine Yellow Light-Yellow, Yellow, Dark-Yellow    Appearance, Urine Turbid (N) Clear    Specific Gravity, Urine 1.037 (N) 1.005 - 1.035    pH, Urine 6.0 5.0, 5.5, 6.0, 6.5, 7.0, 7.5, 8.0    Protein, Urine 70 (1+) (A) NEGATIVE, 10 (TRACE), 20 (TRACE) mg/dL    Glucose, Urine Normal Normal mg/dL    Blood, Urine 0.1 (1+) (A) NEGATIVE    Ketones, Urine NEGATIVE NEGATIVE mg/dL    Bilirubin, Urine NEGATIVE NEGATIVE    Urobilinogen, Urine 2 (1+) (A) Normal mg/dL    Nitrite, Urine NEGATIVE NEGATIVE    Leukocyte Esterase, Urine 250 Harrison/µL (A) NEGATIVE   Microscopic Only, Urine   Result Value Ref Range    WBC, Urine 11-20 (A) 1-5, NONE /HPF    RBC, Urine NONE NONE, 1-2, 3-5 /HPF    Mucus, Urine FEW Reference range not established. /LPF   POCT GLUCOSE   Result Value Ref Range    POCT Glucose 82 74 - 99 mg/dL   POCT GLUCOSE   Result Value Ref Range    POCT Glucose 77 74 - 99 mg/dL   CBC   Result Value Ref Range    WBC 13.0 (H) 4.4 - 11.3 x10*3/uL     nRBC 0.0 0.0 - 0.0 /100 WBCs    RBC 3.22 (L) 4.00 - 5.20 x10*6/uL    Hemoglobin 11.6 (L) 12.0 - 16.0 g/dL    Hematocrit 35.2 (L) 36.0 - 46.0 %     (H) 80 - 100 fL    MCH 36.0 (H) 26.0 - 34.0 pg    MCHC 33.0 32.0 - 36.0 g/dL    RDW 17.1 (H) 11.5 - 14.5 %    Platelets 99 (L) 150 - 450 x10*3/uL   POCT GLUCOSE   Result Value Ref Range    POCT Glucose 76 74 - 99 mg/dL   Comprehensive metabolic panel   Result Value Ref Range    Glucose 67 (L) 74 - 99 mg/dL    Sodium 142 136 - 145 mmol/L    Potassium 5.2 3.5 - 5.3 mmol/L    Chloride 108 (H) 98 - 107 mmol/L    Bicarbonate 28 21 - 32 mmol/L    Anion Gap 11 10 - 20 mmol/L    Urea Nitrogen 32 (H) 6 - 23 mg/dL    Creatinine 1.68 (H) 0.50 - 1.05 mg/dL    eGFR 33 (L) >60 mL/min/1.73m*2    Calcium 8.2 (L) 8.6 - 10.6 mg/dL    Albumin 2.3 (L) 3.4 - 5.0 g/dL    Alkaline Phosphatase 120 33 - 136 U/L    Total Protein 4.2 (L) 6.4 - 8.2 g/dL     (H) 9 - 39 U/L    Bilirubin, Total 1.2 0.0 - 1.2 mg/dL    ALT 55 (H) 7 - 45 U/L   Magnesium   Result Value Ref Range    Magnesium 2.07 1.60 - 2.40 mg/dL   CBC and Auto Differential   Result Value Ref Range    WBC 13.0 (H) 4.4 - 11.3 x10*3/uL    nRBC 0.0 0.0 - 0.0 /100 WBCs    RBC 3.32 (L) 4.00 - 5.20 x10*6/uL    Hemoglobin 11.7 (L) 12.0 - 16.0 g/dL    Hematocrit 35.8 (L) 36.0 - 46.0 %     (H) 80 - 100 fL    MCH 35.2 (H) 26.0 - 34.0 pg    MCHC 32.7 32.0 - 36.0 g/dL    RDW 16.7 (H) 11.5 - 14.5 %    Platelets 99 (L) 150 - 450 x10*3/uL    Neutrophils % 86.9 40.0 - 80.0 %    Immature Granulocytes %, Automated 1.4 (H) 0.0 - 0.9 %    Lymphocytes % 4.1 13.0 - 44.0 %    Monocytes % 6.2 2.0 - 10.0 %    Eosinophils % 1.2 0.0 - 6.0 %    Basophils % 0.2 0.0 - 2.0 %    Neutrophils Absolute 11.32 (H) 1.20 - 7.70 x10*3/uL    Immature Granulocytes Absolute, Automated 0.18 0.00 - 0.70 x10*3/uL    Lymphocytes Absolute 0.53 (L) 1.20 - 4.80 x10*3/uL    Monocytes Absolute 0.81 0.10 - 1.00 x10*3/uL    Eosinophils Absolute 0.15 0.00 - 0.70 x10*3/uL     Basophils Absolute 0.02 0.00 - 0.10 x10*3/uL   Vancomycin   Result Value Ref Range    Vancomycin 18.8 5.0 - 20.0 ug/mL   Heparin Assay, UFH   Result Value Ref Range    Heparin Unfractionated 0.9 See Comment Below for Therapeutic Ranges IU/mL   POCT GLUCOSE   Result Value Ref Range    POCT Glucose 70 (L) 74 - 99 mg/dL   POCT GLUCOSE   Result Value Ref Range    POCT Glucose 57 (L) 74 - 99 mg/dL   POCT GLUCOSE   Result Value Ref Range    POCT Glucose 101 (H) 74 - 99 mg/dL   Heparin Assay, UFH   Result Value Ref Range    Heparin Unfractionated 0.9 See Comment Below for Therapeutic Ranges IU/mL   Extra Urine Gray Tube   Result Value Ref Range    Extra Tube Hold for add-ons.    POCT GLUCOSE   Result Value Ref Range    POCT Glucose 97 74 - 99 mg/dL   Heparin Assay, UFH   Result Value Ref Range    Heparin Unfractionated 0.7 See Comment Below for Therapeutic Ranges IU/mL            PSYCHIATRIC RISK ASSESSMENT  Violence Risk Factors:  stress/destabilizers  Acute Risk of Harm to Others is Considered: Low  Suicide Risk Factors: none  Protective Factors: family/friend support; future oriented;   Acute Risk of Harm to Self is Considered: Low     ASSESSMENT AND PLAN  69 y.o. female with psych history of unspecified bipolar disorder and past medical history COPD, renal transplant in 2015 on cyclosporine, mycophenolate and prednisone, HTN, HLD, hypothyroid, hyperparathyroid, HFmEF (45-50%), AFib, PVD, lymphedema, who initially presented to [facility] on 6/1/24 for epigastric pain, found to have acute pancreatitis and emphysematous pyelonephritis for which she was transferred to [facility]. Patient subsequently transferred to MICU on 6/4/24 due to acute respiratory distress. Patient currently intubated and sedated. Psych consulted for med management, as pt had been on Clozapine as outpatient.      Per collateral from [facility] - last dose of Clozapine 6/2 at 21:12. Absolute neutrophil counts have been elevated since  "admission.     Patient currently intubated and sedated.  Attempted to see patient but she is currently undergoing echo procedure.  Following echo, patient will go for CT imaging.  Per medication history, patient has been on Clozapine 50 mg as outpatient.  As she received the most recent dose within 48 hours, and given her current ANC, it would be appropriate to resume her home dose of Clozapine.    UPDATE 6/6: Pt now extubated. Endorses feeling better. Reports having been stable on home psych regiment - Clozapine and Desvenlafaxine.    IMPRESSION  Bipolar Disorder, unspecified, per hx, not acutely decompensated      RECOMMENDATIONS     Safety:  - Patient does not currently meet criteria for inpatient psychiatric admission.   - To evaluate decision-making capacity, recommend use of the Capacity Evaluation Tool. Search “[facility] Capacity Evaluation under SmartText\" unless the patient has a legal guardian, in which case all decisions per the legal guardian.  - Patient does not require a 1:1 sitter from a psychiatric perspective at this time.  - Defer to primary team decision for 1:1 sitter.  - As with all hospitalized patients, would recommend delirium precautions, as below.     Workup:  - Most recent ANC=11.32     Medications:  - Clozapine restarted this morning at 25 mg PO.   - Starting tomorrow (6/7), please increase Clozapine to 50 mg (home dose) PO at bedtime.  - Continue with home Desvenlafaxine 100 mg PO daily.     Follow-up:  - Follows with psychiatrist, Dr. Cordova in [place], [place] as outpatient.    Delirium Guidelines  Non-Pharmacologic:  - Assess visual and hearing impairments and provide aids and communication boards.  - Assess immobility and advocate for early evaluation and intervention by physical therapy, out of bed when medically indicated, and expeditious removal of tethers.  - Promote physiologic sleep and maintenance of sleep/wake cycle by ensuring blinds are open during the day, maintaining " dark/quiet room at night with minimal interruptions, and minimizing daytime naps.  - Minimize room and staff changes.  - Engage the patient in cognitively stimulating activities and provide frequent reorientation.   - Minimize use of restraints to situations where necessary to keep patient and staff safe and to prevent from removing lines, tubes, medical devices, dressings, etc.     Pharmacologic:  - Minimize use of deliriogenic medications such as benzodiazepines, anticholinergic medications, and opiates (while ensuring adequate treatment of pain).  - Assess and treat disruption in bowel and bladder function.   - Assess and treat abnormalities in nutrition and hydration status.       - Discussed recommendations with primary team.  - Psychiatry will sign-off at this time.    Pt discussed with attending psychiatrist, Dr. Blancas who agrees with the assessment and plan.     Thank you for allowing us to participate in the care of this patient. Please page [phone] with any questions or concerns.      Devin Anglin MD  PGY2 Psychiatry

## 2024-06-06 NOTE — SIGNIFICANT EVENT
Floor Readiness Note      I, personally, evaluated Lea Paulino prior to transfer to the floor, including reviewing all current laboratory and imaging studies. The patient remains appropriate for transfer to the floor. Bedside nurse and respiratory therapy are also in agreement of patient's readiness for the floor.      Brief summary:  Lea Paulino is a 69 y.o. female PMHx COPD, renal transplant in 2015 on cyclosporine, mycophenolate and prednisone, HTN, HLD, hypothyroid, hyperparathyroid, HFmEF (45-50%), AFib, PVD, lymphedema, 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, who was transferred to the MICU on 6/4 for AMS and acute hypercapenic respiratory failure placed on BiPAP and later intubated for worsening respiratory failure. She was extubated to 3L NC around noon on 6/5 and now stable for transfer to the floor.        Updated focused Physical Exam:  General: alert, conversant, in no apparent distress  HEENT: normocephalic, atraumatic, EOMI, no scleral icterus  CV: RRR, no murmurs  Pulm: CTAB, no wheezes or crackles, no increased work of breathing. Wearing NC   Abd: soft, non tender, non distended  : no jimenez   Ext: warm, no lower extremity edema  Skin: no rashes  Neuro: moving all extremities  Psych: normal affect       Current Vital Signs:  Heart Rate: 85 (06/06/24 1700 : Ira Ly V RN)  BP: 131/55 (06/06/24 1700 : Iar Ly V RN)  Temp: 36.4 °C (97.5 °F) (06/06/24 1700: Dary Cordoba)  Resp: 23 (06/06/24 1700 : Ira Ly V, RN)  SpO2: 97% (06/06/24 1700 : Ira Ly V RN)     Relevant updates since rounds:  Discuss with renal transplant Abx management     Accepting team, Naff, received verbal sign out and the Provider Care team/Attending has been updated. Bedside nurse will now call accepting nurse for report and patient will be transferred to [place] [phone]    Joey Al MD

## 2024-06-06 NOTE — PROGRESS NOTES
"Lea Paulino is a 69 y.o. female on day 3 of admission presenting with Pyelonephritis.    Subjective   Extubated on 6/5. On nasal oxygen.  Patient reported she feel better.     Objective   Range of Vitals (last 24 hours)  Heart Rate:  []   Temp:  [35.7 °C (96.3 °F)-37.1 °C (98.8 °F)]   Resp:  [19-29]   BP: (131-180)/(50-79)   Weight:  [97.8 kg (215 lb 9.8 oz)]   SpO2:  [90 %-100 %]   Daily Weight  06/06/24 : 97.8 kg (215 lb 9.8 oz)    Body mass index is 39.43 kg/m².    Physical Exam  Looks sleepy   Abdomen is non tender. NO CVA tenderness noted.    Relevant Results  Labs  Results from last 72 hours   Lab Units 06/06/24  0947 06/06/24  0219 06/05/24  2327 06/05/24  0916   WBC AUTO x10*3/uL 14.4* 13.0* 13.0* 14.5*   HEMOGLOBIN g/dL 12.1 11.7* 11.6* 11.0*   HEMATOCRIT % 38.9 35.8* 35.2* 35.9*   PLATELETS AUTO x10*3/uL 135* 99* 99* 115*   NEUTROS PCT AUTO % 86.2 86.9  --  88.1   LYMPHS PCT AUTO % 3.5 4.1  --  2.9   MONOS PCT AUTO % 7.8 6.2  --  6.4   EOS PCT AUTO % 1.1 1.2  --  0.6     Results from last 72 hours   Lab Units 06/06/24  0219 06/05/24  0325 06/04/24  1230   SODIUM mmol/L 142 144 144   POTASSIUM mmol/L 5.2 4.3 5.5*   CHLORIDE mmol/L 108* 109* 108*   CO2 mmol/L 28 25 28   BUN mg/dL 32* 24* 25*   CREATININE mg/dL 1.68* 1.45* 1.37*   GLUCOSE mg/dL 67* 67* 89   CALCIUM mg/dL 8.2* 8.5* 9.1   ANION GAP mmol/L 11 14 14   EGFR mL/min/1.73m*2 33* 39* 42*   PHOSPHORUS mg/dL  --   --  2.0*     Results from last 72 hours   Lab Units 06/06/24  0219 06/05/24  0325 06/04/24  1230 06/04/24  0350   ALK PHOS U/L 120 99  --  48   BILIRUBIN TOTAL mg/dL 1.2 1.3*  --  0.5   PROTEIN TOTAL g/dL 4.2* 4.1*  --  4.9*   ALT U/L 55* 11  --  7   AST U/L 168* 34  --  12   ALBUMIN g/dL 2.3* 2.3* 2.8* 2.7*     Estimated Creatinine Clearance: 35 mL/min (A) (by C-G formula based on SCr of 1.68 mg/dL (H)).  No results found for: \"CRP\"    Imaging  CXR: 6/3/2024:  1.  Right-sided pleural effusion with " associated  atelectasis/consolidation.  2. No acute radiographic findings of the abdomen. Large amount of  retained stool throughout the colon rectum.  3. Additional findings as above.     CT A/P: 6/1/2024:  1. Fullness and inflammation of the pancreatic head and body most suggestive   of pancreatitis but suboptimally evaluated without IV contrast. Recommend   clinical and laboratory correlation for pancreatitis and consider follow-up   with contrast enhanced imaging to exclude underlying mass.   2. Large colonic stool burden, which may indicate constipation.   3. Right lower quadrant transplant kidney. Tiny stone in the transplant   kidney. Some gas in the right lower quadrant transplant kidney collecting   system and the urinary bladder, which could be related to recent   instrumentation or infection with gas-forming organism.       Micro:  6/1: Urine Cx: Mixed GNR: Gram positive, proteus (sensitivity  to follow), GNR (sensitivity to follow)  6/1: [facility] [phone]  6/3: Blood Cx: NGTD  6/3: Urine Cx: Negative  6/5: Urine Cx: Negative  ---------------------------------------------------------------  5/8: Urine Cx: Mixed edison  4/10: Urine Cx: Pseudomomna  9/2023: Urine Cx: Klebsiella Oxytoca  8/2023: Klebsiella, proteus, GNR  5/2023: Urine Cx: Klebsiella Oxytoca  10/2022: Urine Cx: E Coli     Previous micro:  Urine culture: 6/3/2024: NGTD  Urine cultures: 4/2024: Pseudomonas (Pan susceptible)  Urine Culture: 1/12/2022: E Fecalis (R: Gentamicin, I: Doxy)     Abx:  Vancomycin: 6/1 - p  Meropenem: 6/2 - p  ----------------------  IV PipTazo: One time     Assessment/Plan   69 y.o. female with past medical history of renal transplant-in 2015, on cyclosporine, MMF, prednisone, hypertension, hyperlipidemia, COPD, CHF, A-fib, peripheral arterial disease, lymphedema was transferred from outside hospital for concerns of emphysematous pyelonephritis of the transplanted kidney and tiny right  transplant non obstructing renal stone along with acute pancreatitis. Transferred to [facility] on 6/2. ID consulted for emphysematous pyelonephritis.  Patient was seen by ID at [facility] and was recommended to continue Meropenem and Vancomycin.  Most likely microbiology for emphysematous UTI's are gram negative microorganisms such as Klebsiella and E Coli.  For now will treat her empirically with broad spectrum abx  Meropenem (for possible resistant organisms).     6/5: Remained intubated but weaned off of sedation. Remained hemodynamically stable. Called Micro lab at [facility] and mixed culture is growing proteus and pending speciation for GNR. WBC were 11-20 on repeat UA.     Updates 6/6  Extubated on 6/5, on nasal oxygen. Clinically better.   Urine culture from [facility] (6/1) growing Proteus, E.coli, Kleb and some Gram positive bacteria, which sensitivities will come back probably tomorrow. Our urine cultures was negative on 6/3 and 6/5.      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  Will follow up with [facility] again tomorrow.  ([phone])     Plan was discussed with ID attending Dr Gipson.  We will continue to follow the patient.      Veronica Nayak MD  PGY4, ID Fellow.   Team B Pager: [phone]  For new consults, contact pager [phone].   EPIC chat preferred.

## 2024-06-06 NOTE — CONSULTS
Vancomycin Dosing by Pharmacy- FOLLOW UP    Lea Paulino is a 69 y.o. year old female who Pharmacy has been consulted for vancomycin dosing for other UTI . Based on the patient's indication and renal status this patient is being dosed based on a goal trough/random level of 15-20.     Renal function is currently declining.    Current vancomycin dose: Dose by level.     Most recent random level: 18.8 mcg/mL    Visit Vitals  /69   Pulse 97   Temp 36.4 °C (97.5 °F) (Temporal)   Resp 21        Lab Results   Component Value Date    CREATININE 1.68 (H) 2024    CREATININE 1.45 (H) 2024    CREATININE 1.37 (H) 2024    CREATININE 1.42 (H) 2024    CREATININE 1.50 2023        Patient weight is as follows:   Vitals:    24 0400   Weight: 97.8 kg (215 lb 9.8 oz)       Cultures:  No results found for the encounter in last 14 days.       I/O last 3 completed shifts:  In: 2296.8 (23.5 mL/kg) [P.O.:400; I.V.:76.8 (0.8 mL/kg); NG/GT:350; IV Piggyback:1470]  Out: 825 (8.4 mL/kg) [Urine:825 (0.2 mL/kg/hr)]  Weight: 97.8 kg   I/O during current shift:  I/O this shift:  In: -   Out: 155 [Urine:155]    Temp (24hrs), Av.5 °C (97.7 °F), Min:35.7 °C (96.3 °F), Max:37.3 °C (99.1 °F)      Assessment/Plan    Within goal random/trough level. Will redose with vancomycin 500 mg x1.   The next level will be obtained on 24 with morning labs. May be obtained sooner if clinically indicated.   Will continue to monitor renal function daily while on vancomycin and order serum creatinine at least every 48 hours if not already ordered.  Follow for continued vancomycin needs, clinical response, and signs/symptoms of toxicity.       Sheila Molina, PharmD

## 2024-06-06 NOTE — PROGRESS NOTES
Occupational Therapy    Evaluation    Patient Name: Lea Paulino  MRN: [id number]  Today's Date: 6/6/2024  Time Calculation  Start Time: 1006  Stop Time: 1037  Time Calculation (min): 31 min  OT Student under direct supervision of OTR/L   Assessment  IP OT Assessment  OT Assessment: impaired ADLs/transfers  Prognosis: Good  Evaluation/Treatment Tolerance: Patient limited by fatigue (increased dizziness and BP at EOB)  Medical Staff Made Aware: Yes  End of Session Communication: Bedside nurse  End of Session Patient Position: Bed, 3 rail up, Alarm off, not on at start of session  Plan:  Treatment Interventions: ADL retraining, Endurance training, Patient/family training, Functional transfer training, UE strengthening/ROM, Equipment evaluation/education, Neuromuscular reeducation, Compensatory technique education  OT Frequency: 3 times per week  OT Discharge Recommendations: Moderate intensity level of continued care  OT - OK to Discharge: Yes    Subjective     General:  General  Reason for Referral: 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, who was transferred to the MICU on 6/4 for AMS and acute hypercapenic respiratory failure placed on BiPAP and later intubated for worsening respiratory failure, extubated 6/5  Past Medical History Relevant to Rehab: COPD, renal transplant in 2015 on cyclosporine, mycophenolate and prednisone, HTN, HLD, hypothyroid, hyperparathyroid, HFmEF (45-50%), AFib, PVD, lymphedema  Family/Caregiver Present: No  Co-Treatment: PT  Co-Treatment Reason: to maximize patient safety and theraputic gains due to complex medical course  Prior to Session Communication: Bedside nurse  Patient Position Received: Bed, 3 rail up, Alarm off, not on at start of session  General Comment: patient pleasant and agreeable to OT; tele, folley, IV, O2 3 L  Precautions:  Hearing/Visual Limitations: Hearing WFL  Medical Precautions: Fall precautions, Oxygen therapy device and  "L/min  Vital Signs:  Heart Rate:  (pre 104, post 107)  SpO2:  (pre 99, post 98)  BP:  (pre 179/79, /78 placed larger BP cuff on patient, post 135/68)  Pain:  Pain Assessment  Pain Assessment: 0-10  Pain Score: 5 - Moderate pain (\"everywhere\" aching pain)    Objective   Cognition:  Overall Cognitive Status: Impaired  Arousal/Alertness: Appropriate responses to stimuli  Orientation Level:  (oriented x3 however did not know correct date of birthday)  Following Commands: Follows one step commands with repetition        [place] Agitation Sedation Scale  [place] Agitation Sedation Scale (RASS): Drowsy  Home Living:  Type of Home: Apartment (9th floor apt)  Lives With: Alone  Home Adaptive Equipment:  (rollator)  Home Layout: One level  Home Access: Elevator  Bathroom Shower/Tub: Tub/shower unit  Bathroom Toilet: Standard  Bathroom Equipment: Grab bars in shower, Shower chair with back   Prior Function:  Level of [place]: Independent with homemaking with ambulation, Independent with ADLs and functional transfers  Receives Help From: Family (daughter helps as needed)  ADL Assistance: Independent  Homemaking Assistance: Independent  Ambulatory Assistance: Independent (uses rollator in home and community)  Vocational: Retired  Leisure: watching tv  Hand Dominance: Right  Prior Function Comments: (+) drives  IADL History:     ADL:  Eating Assistance: Minimal  Eating Deficit:  (anticipated)  Grooming Assistance: Minimal  Grooming Deficit:  (patient able to reach up to face while sitting EOB however anticipate increased assist for grooming tasks)  Bathing Assistance: Maximal  Bathing Deficit:  (anticipated)  UE Dressing Assistance: Minimal (anticipated)  UE Dressing Deficit: Increased time to complete, Supervision/safety, Pull over head  LE Dressing Assistance: Total  LE Dressing Deficit: Don/doff R sock, Don/doff L sock  Toileting Assistance with Device: Maximal  Toileting Deficit:  (anticipated)  Functional " "Assistance:  (anticipated)  Activity Tolerance:  Endurance: Tolerates less than 10 min exercise with changes in vital signs (Increase of EOB and patient complains of dizziness at EOB)  Early Mobility/Exercise Safety Screen: Proceed with mobilization - No exclusion criteria met  Activity Tolerance Comments: limited due to weakness, pain, and fatigue  Bed Mobility/Transfers: Bed Mobility  Bed Mobility: Yes  Bed Mobility 1  Bed Mobility 1: Supine to sitting  Level of Assistance 1: Dependent, +2, Moderate verbal cues, Moderate tactile cues  Bed Mobility 2  Bed Mobility  2: Sitting to supine  Level of Assistance 2: Dependent, +2, Moderate verbal cues    Transfers  Transfer: No    Sitting Balance:  Static Sitting Balance  Static Sitting-Balance Support: Feet unsupported  Static Sitting-Level of Assistance: Contact guard, Minimum assistance (min a with fatigue)  Static Sitting-Comment/Number of Minutes: 10 min  Dynamic Sitting Balance  Dynamic Sitting-Balance Support: Feet unsupported  Dynamic Sitting-Balance: Reaching for objects (kicking legs)  Dynamic Sitting-Comments:  (min a for stability)     Vision: Vision - Basic Assessment  Current Vision: Wears glasses only for reading (patient stated \"should be wearing glasses more\")  Sensation:  Light Touch: No apparent deficits  Strength:  Strength Comments: (L) shoulder 3-/5, (R) shoulder grossly 3/5, (B) elbows >/= 3/5, (L)  3+/5, (R)  4-/5    Extremities: RUE   RUE :  (AROM WFL) and LUE   LUE:  (AROM shoulder 0-90 degrees, AAROM WFL, elbow to hand AROM WFL)    Outcome Measures: [facility] Daily Activity  Putting on and taking off regular lower body clothing: Total  Bathing (including washing, rinsing, drying): A lot  Putting on and taking off regular upper body clothing: A little  Toileting, which includes using toilet, bedpan or urinal: A lot  Taking care of personal grooming such as brushing teeth: A little  Eating Meals: A little  Daily Activity - Total Score: 14    , " Confusion Assessment Method-ICU (CAM-ICU)  Feature 1: Acute Onset or Fluctuating Course: Positive  Feature 2: Inattention: Positive  Feature 3: Altered Level of Consciousness: Positive  Feature 4: Disorganized Thinking: Positive  Overall CAM-ICU: Positive  , and E = Exercise and Early Mobility  Early Mobility/Exercise Safety Screen: Proceed with mobilization - No exclusion criteria met  ICU Mobility Scale: Sitting over edge of bed  Education Documentation  Body Mechanics, taught by HOANG Harris at 6/6/2024 12:30 PM.  Learner: Patient  Readiness: Acceptance  Method: Explanation  Response: Needs Reinforcement    ADL Training, taught by HOANG Harris at 6/6/2024 12:30 PM.  Learner: Patient  Readiness: Acceptance  Method: Explanation  Response: Needs Reinforcement    Education Comments  No comments found.      Goals:   Encounter Problems       Encounter Problems (Active)       ADLs       Patient with complete upper body dressing with supervision level of assistance donning and doffing all UE clothes with no adaptive equipment.       Start:  06/06/24    Expected End:  06/20/24            Patient with complete lower body dressing with minimal assist  level of assistance donning and doffing all LE clothes  with PRN adaptive equipment.       Start:  06/06/24    Expected End:  06/20/24            Patient will feed self with independent and set-up level of assistance using PRN adaptive equipment.       Start:  06/06/24    Expected End:  06/20/24            Patient will complete daily grooming tasks with supervision level of assistance and PRN adaptive equipment while edge of bed .       Start:  06/06/24    Expected End:  06/20/24            Patient will complete toileting including hygiene clothing management/hygiene with contact guard assist level of assistance.       Start:  06/06/24    Expected End:  06/20/24               BALANCE       Pt will maintain dynamic standing balance during ADL task with  minimal assist  level of assistance in order to demonstrate decreased risk of falling and improved postural control.       Start:  06/06/24    Expected End:  06/20/24               EXERCISE/STRENGTHENING       Patient will complete BUE exercises in order to improve strength and activity tolerance for ADL performance.        Start:  06/06/24    Expected End:  06/20/24               MOBILITY       Patient will perform Functional mobility Household distances/Community Distances with minimal assist  level of assistance and front wheeled walker in order to improve safety and functional mobility.       Start:  06/06/24    Expected End:  06/20/24               TRANSFERS       Patient will perform bed mobility minimal assist  level of assistance in order to improve safety and independence with mobility       Start:  06/06/24    Expected End:  06/20/24            Patient will complete functional transfers with front wheeled walker with minimal assist level of assistance.       Start:  06/06/24    Expected End:  06/20/24               Millie Munroe S/OT

## 2024-06-06 NOTE — PROGRESS NOTES
Physical Therapy    Physical Therapy Evaluation    Patient Name: Lea Paulino  MRN: [id number]  Today's Date: 6/6/2024   Time Calculation  Start Time: 1005  Stop Time: 1037  Time Calculation (min): 32 min    PT student under the direct supervision of a licensed physical therapist     Assessment/Plan   PT Assessment  PT Assessment Results: Decreased strength, Decreased endurance, Impaired balance, Decreased mobility, Obesity, Decreased cognition  Rehab Prognosis: Good  End of Session Communication: Bedside nurse  Assessment Comment: patient is a 61 y.o F who presents with AMS and acute hypercapnic respiratory failure with hx of COPD. Patient demonstrates impaired strength, endurance, balance, and mobility. Due to persistent dizziness when sitting EOB, patient was limited in performing further functional actvity. Patient would benefit from continued skilled PT services to address deficits stated above.  End of Session Patient Position: Bed, 3 rail up, Alarm off, not on at start of session  IP OR SWING BED PT PLAN  Inpatient or Swing Bed: Inpatient  PT Plan  Treatment/Interventions: Bed mobility, Transfer training, Gait training, Balance training, Neuromuscular re-education, Strengthening, Endurance training, Therapeutic exercise, Therapeutic activity, Positioning, Postural re-education  PT Plan: Skilled PT  PT Frequency: 3 times per week  PT Discharge Recommendations: Moderate intensity level of continued care  PT Recommended Transfer Status: Assist x2  PT - OK to Discharge: Yes      Subjective   General Visit Information:  General  Reason for Referral: Admitted to the hospital for emphysematus pyelonephtitis and acute pancreatitis. Transferred to the MICU on 6/4 due to AMS and acute hypercapnic respiratory failure. Patient was intubated but was extubated on 6/5.  Past Medical History Relevant to Rehab: COPD, renal transplant, HTN, HLD, hypothyroid, hyperparathyroid, HFmrEF, Afib, PVD, lymphadema, bipolar  "disorder; denied falls/6 months.  Family/Caregiver Present: No  Co-Treatment: OT  Co-Treatment Reason: Requires x2 skilled assist for maximizing functional mobility  Prior to Session Communication: Bedside nurse  Patient Position Received: Bed, 3 rail up, Alarm off, not on at start of session  General Comment: Patient alert and cooperative during session  Home Living:  Home Living  Type of Home: Apartment (on 9th floor)  Lives With: Alone  Home Adaptive Equipment:  (Rollator)  Home Layout: One level  Home Access: Elevator (no stairs to enter)  Bathroom Shower/Tub: Tub/shower unit  Bathroom Equipment: Grab bars in shower (Shower chair)  Prior Level of Function:  Prior Function Per Pt/Caregiver Report  Level of [place]: Independent with ADLs and functional transfers, Independent with homemaking with ambulation  Receives Help From: Family (Daughter has recently been helping with errands and groceries)  ADL Assistance: Independent  Homemaking Assistance: Independent  Ambulatory Assistance: Independent  Vocational: Retired  Hand Dominance: Right  Prior Function Comments: (+) drives  Precautions:  Precautions  Hearing/Visual Limitations: Patient stated that she does not currently wear glasses  Medical Precautions: Fall precautions, Oxygen therapy device and L/min  Vital Signs:  Vital Signs  Heart Rate:  (Pre: 104   Post: 107)  Resp:  (Pre: 23   Post: 19)  SpO2:  (Pre: 99%   Post: 98%)  BP:  (Pre: 179/79 (101)   EOB: 131/79 (>65) used larger BP cuff d/t ill fitting smaller cuff (RN aware)   Post: 135/68 (81))  BP Location: Right arm  BP Method: Automatic    Objective   Pain:  Pain Assessment  Pain Assessment: 0-10  Pain Score: 5 - Moderate pain (Patient described aching pain \"everywhere\")  Cognition:  Cognition  Overall Cognitive Status: Impaired  Arousal/Alertness: Appropriate responses to stimuli  Orientation Level:  (AOx3, oriented to full name, location, month, and year)  Following Commands: Follows one step " commands with repetition    General Assessments:  General Observation  General Observation: jimenez catheter, 2-3 L NC, PIVs     Activity Tolerance  Early Mobility/Exercise Safety Screen: Proceed with mobilization - No exclusion criteria met  Activity Tolerance Comments: Decreased tolerance for sitting EOB due to patient reporting dizziness that has been persistent throughout EOB.    Sensation  Sensation Comment: Patient reported no sensation deficits    Strength  Strength Comments: L shoulder 3-/5, R shoulder 3/5; B : 4-/5. Remainder B UE >/= 3/5. L DF/PF: 4-/5, R DF/PF: 4/5. Remainder B LE: >/= 3/5  Postural Control  Postural Control: Impaired (Flexed forward posture when sitting EOB)    Static Sitting Balance  Static Sitting-Balance Support: Bilateral upper extremity supported, Feet unsupported  Static Sitting-Level of Assistance: Contact guard  Static Sitting-Comment/Number of Minutes: with fatigue, progressed to MINx1  Dynamic Sitting Balance  Dynamic Sitting-Balance Support: Bilateral upper extremity supported, Feet unsupported  Dynamic Sitting-Balance:  (Alternating knee extension)  Dynamic Sitting-Comments: Postural sway when sitting EOB; MINx1     Functional Assessments:  Bed Mobility  Bed Mobility: Yes  Bed Mobility 1  Bed Mobility 1: Supine to sitting, Sitting to supine  Level of Assistance 1: Dependent, +2, Moderate verbal cues, Moderate tactile cues    Transfers  Transfer: No    Ambulation/Gait Training  Ambulation/Gait Training Performed: No     Extremity/Trunk Assessments:  RUE   RUE :  (AROM grossly WFL)  LUE   LUE:  (AROM shoulder ~90 deg, AAROM WFL; Remainder AROM WFL)  RLE   RLE :  (AROM grossly WFL)  LLE   LLE : Within Functional Limits (AROM grossly WFL)  Outcome Measures:  [facility] Basic Mobility  Turning from your back to your side while in a flat bed without using bedrails: A lot  Moving from lying on your back to sitting on the side of a flat bed without using bedrails: A lot  Moving to and  from bed to chair (including a wheelchair): Total  Standing up from a chair using your arms (e.g. wheelchair or bedside chair): Total  To walk in hospital room: Total  Climbing 3-5 steps with railing: Total  Basic Mobility - Total Score: 8    Confusion Assessment Method-ICU (CAM-ICU)  Feature 1: Acute Onset or Fluctuating Course: Positive  Feature 2: Inattention: Positive  Feature 4: Disorganized Thinking: Positive  Overall CAM-ICU: Positive    FSS-ICU  Ambulation: Unable to attempt due to weakness  Rolling: Total assistance (performs 25% or requires another person)  Sitting: Minimal assistance (performs 75% or more of task)  Transfer Sit-to-Stand: Unable to perform  Transfer Supine-to-Sit: Total assistance (performs 25% or requires another person)  Total Score: 6    ICU Mobility Screen  Early Mobility/Exercise Safety Screen: Proceed with mobilization - No exclusion criteria met  ICU Mobility Scale: Sitting over edge of bed    Encounter Problems       Encounter Problems (Active)       Balance       Patient will complete static (independent) and dynamic (supervision) sitting balance activities using UE support as needed in order to maintain midline without acute LOB and reporting < 3/10 RPE scale       Start:  06/06/24    Expected End:  06/27/24            Patient will complete static (CGx1) and dynamic (MINx1) standing balance activities using 2ww without acute LOB        Start:  06/06/24    Expected End:  06/27/24               Mobility       Patient will participate in BLE there-ex program in order to assist in improving strength and to assist with the completion of functional mobility tasks.        Start:  06/06/24    Expected End:  06/27/24            Patient will ambulate >/=50' with 2ww with MINx1 without acute LOB and without rest break       Start:  06/06/24    Expected End:  06/27/24            Patient will complete bed mobility with MINx1 with the use of handrails        Start:  06/06/24    Expected End:   06/27/24               PT Transfers       Patient will complete bed<->chair transfer with MINx1 using LRD as needed without acute LOB        Start:  06/06/24    Expected End:  06/27/24            Patient will complete STS with [id number] using 2ww without acute LOB         Start:  06/06/24    Expected End:  06/27/24                   Education Documentation  Mobility Training, taught by GRETA Nunes at 6/6/2024  3:08 PM.  Learner: Patient  Readiness: Acceptance  Method: Explanation  Response: Demonstrated Understanding, Needs Reinforcement  Comment: Mobility progression        Zahra Barroso, SPT

## 2024-06-07 ENCOUNTER — APPOINTMENT (OUTPATIENT)
Dept: RADIOLOGY | Facility: HOSPITAL | Age: 69
End: 2024-06-07
Payer: MEDICARE

## 2024-06-07 ENCOUNTER — APPOINTMENT (OUTPATIENT)
Dept: CARDIOLOGY | Facility: HOSPITAL | Age: 69
End: 2024-06-07
Payer: MEDICARE

## 2024-06-07 LAB
ALBUMIN SERPL BCP-MCNC: 2.5 G/DL (ref 3.4–5)
ALBUMIN SERPL BCP-MCNC: 2.6 G/DL (ref 3.4–5)
ALP SERPL-CCNC: 149 U/L (ref 33–136)
ALP SERPL-CCNC: 161 U/L (ref 33–136)
ALT SERPL W P-5'-P-CCNC: 59 U/L (ref 7–45)
ALT SERPL W P-5'-P-CCNC: 60 U/L (ref 7–45)
ANION GAP BLDA CALCULATED.4IONS-SCNC: 4 MMO/L (ref 10–25)
ANION GAP BLDA CALCULATED.4IONS-SCNC: 4 MMO/L (ref 10–25)
ANION GAP SERPL CALC-SCNC: 14 MMOL/L (ref 10–20)
ANION GAP SERPL CALC-SCNC: 14 MMOL/L (ref 10–20)
AST SERPL W P-5'-P-CCNC: 108 U/L (ref 9–39)
AST SERPL W P-5'-P-CCNC: 113 U/L (ref 9–39)
BASE EXCESS BLDA CALC-SCNC: 0.2 MMOL/L (ref -2–3)
BASE EXCESS BLDA CALC-SCNC: 0.3 MMOL/L (ref -2–3)
BASOPHILS # BLD AUTO: 0.02 X10*3/UL (ref 0–0.1)
BASOPHILS NFR BLD AUTO: 0.1 %
BILIRUB DIRECT SERPL-MCNC: 0.6 MG/DL (ref 0–0.3)
BILIRUB SERPL-MCNC: 0.9 MG/DL (ref 0–1.2)
BILIRUB SERPL-MCNC: 1.1 MG/DL (ref 0–1.2)
BKV DNA SERPL NAA+PROBE-ACNC: 28 IU/ML
BKV DNA SERPL NAA+PROBE-LOG#: 1.45 LOG IU/ML
BNP SERPL-MCNC: 239 PG/ML (ref 0–99)
BODY TEMPERATURE: 37 DEGREES CELSIUS
BODY TEMPERATURE: 37 DEGREES CELSIUS
BUN SERPL-MCNC: 36 MG/DL (ref 6–23)
BUN SERPL-MCNC: 37 MG/DL (ref 6–23)
CA-I BLDA-SCNC: 1.09 MMOL/L (ref 1.1–1.33)
CA-I BLDA-SCNC: 1.15 MMOL/L (ref 1.1–1.33)
CALCIUM SERPL-MCNC: 7.9 MG/DL (ref 8.6–10.6)
CALCIUM SERPL-MCNC: 8.3 MG/DL (ref 8.6–10.6)
CARDIAC TROPONIN I PNL SERPL HS: 53 NG/L (ref 0–34)
CHLORIDE BLDA-SCNC: 107 MMOL/L (ref 98–107)
CHLORIDE BLDA-SCNC: 108 MMOL/L (ref 98–107)
CHLORIDE SERPL-SCNC: 107 MMOL/L (ref 98–107)
CHLORIDE SERPL-SCNC: 109 MMOL/L (ref 98–107)
CMV DNA SERPL NAA+PROBE-LOG IU: NORMAL {LOG_IU}/ML
CO2 SERPL-SCNC: 25 MMOL/L (ref 21–32)
CO2 SERPL-SCNC: 28 MMOL/L (ref 21–32)
CREAT SERPL-MCNC: 1.79 MG/DL (ref 0.5–1.05)
CREAT SERPL-MCNC: 1.79 MG/DL (ref 0.5–1.05)
EBV DNA SPEC NAA+PROBE-LOG#: ABNORMAL {LOG_COPIES}/ML
EGFRCR SERPLBLD CKD-EPI 2021: 31 ML/MIN/1.73M*2
EGFRCR SERPLBLD CKD-EPI 2021: 31 ML/MIN/1.73M*2
EOSINOPHIL # BLD AUTO: 0.23 X10*3/UL (ref 0–0.7)
EOSINOPHIL NFR BLD AUTO: 1.7 %
ERYTHROCYTE [DISTWIDTH] IN BLOOD BY AUTOMATED COUNT: 17.1 % (ref 11.5–14.5)
GLUCOSE BLD MANUAL STRIP-MCNC: 100 MG/DL (ref 74–99)
GLUCOSE BLD MANUAL STRIP-MCNC: 101 MG/DL (ref 74–99)
GLUCOSE BLD MANUAL STRIP-MCNC: 104 MG/DL (ref 74–99)
GLUCOSE BLD MANUAL STRIP-MCNC: 108 MG/DL (ref 74–99)
GLUCOSE BLD MANUAL STRIP-MCNC: 109 MG/DL (ref 74–99)
GLUCOSE BLD MANUAL STRIP-MCNC: 127 MG/DL (ref 74–99)
GLUCOSE BLD MANUAL STRIP-MCNC: 173 MG/DL (ref 74–99)
GLUCOSE BLD MANUAL STRIP-MCNC: 61 MG/DL (ref 74–99)
GLUCOSE BLD MANUAL STRIP-MCNC: 81 MG/DL (ref 74–99)
GLUCOSE BLDA-MCNC: 116 MG/DL (ref 74–99)
GLUCOSE BLDA-MCNC: 562 MG/DL (ref 74–99)
GLUCOSE SERPL-MCNC: 149 MG/DL (ref 74–99)
GLUCOSE SERPL-MCNC: 47 MG/DL (ref 74–99)
HCO3 BLDA-SCNC: 26 MMOL/L (ref 22–26)
HCO3 BLDA-SCNC: 27.6 MMOL/L (ref 22–26)
HCT VFR BLD AUTO: 37.8 % (ref 36–46)
HCT VFR BLD EST: 33 % (ref 36–46)
HCT VFR BLD EST: 34 % (ref 36–46)
HGB BLD-MCNC: 11.3 G/DL (ref 12–16)
HGB BLDA-MCNC: 10.9 G/DL (ref 12–16)
HGB BLDA-MCNC: 11.3 G/DL (ref 12–16)
IMM GRANULOCYTES # BLD AUTO: 0.18 X10*3/UL (ref 0–0.7)
IMM GRANULOCYTES NFR BLD AUTO: 1.3 % (ref 0–0.9)
INHALED O2 CONCENTRATION: 32 %
INHALED O2 CONCENTRATION: 40 %
LABORATORY COMMENT REPORT: ABNORMAL
LABORATORY COMMENT REPORT: DETECTED
LABORATORY COMMENT REPORT: NOT DETECTED
LACTATE BLDA-SCNC: 0.4 MMOL/L (ref 0.4–2)
LACTATE BLDA-SCNC: 0.6 MMOL/L (ref 0.4–2)
LACTATE SERPL-SCNC: 0.6 MMOL/L (ref 0.4–2)
LYMPHOCYTES # BLD AUTO: 0.47 X10*3/UL (ref 1.2–4.8)
LYMPHOCYTES NFR BLD AUTO: 3.4 %
MAGNESIUM SERPL-MCNC: 2.01 MG/DL (ref 1.6–2.4)
MAGNESIUM SERPL-MCNC: 2.09 MG/DL (ref 1.6–2.4)
MCH RBC QN AUTO: 34.7 PG (ref 26–34)
MCHC RBC AUTO-ENTMCNC: 29.9 G/DL (ref 32–36)
MCV RBC AUTO: 116 FL (ref 80–100)
MONOCYTES # BLD AUTO: 1.06 X10*3/UL (ref 0.1–1)
MONOCYTES NFR BLD AUTO: 7.7 %
NEUTROPHILS # BLD AUTO: 11.86 X10*3/UL (ref 1.2–7.7)
NEUTROPHILS NFR BLD AUTO: 85.8 %
NRBC BLD-RTO: 0 /100 WBCS (ref 0–0)
OXYHGB MFR BLDA: 92.4 % (ref 94–98)
OXYHGB MFR BLDA: 96.1 % (ref 94–98)
PCO2 BLDA: 46 MM HG (ref 38–42)
PCO2 BLDA: 56 MM HG (ref 38–42)
PH BLDA: 7.3 PH (ref 7.38–7.42)
PH BLDA: 7.36 PH (ref 7.38–7.42)
PHOSPHATE SERPL-MCNC: 2.6 MG/DL (ref 2.5–4.9)
PHOSPHATE SERPL-MCNC: 2.7 MG/DL (ref 2.5–4.9)
PLATELET # BLD AUTO: 115 X10*3/UL (ref 150–450)
PO2 BLDA: 68 MM HG (ref 85–95)
PO2 BLDA: 91 MM HG (ref 85–95)
POTASSIUM BLDA-SCNC: 3.9 MMOL/L (ref 3.5–5.3)
POTASSIUM BLDA-SCNC: 4 MMOL/L (ref 3.5–5.3)
POTASSIUM SERPL-SCNC: 4.1 MMOL/L (ref 3.5–5.3)
POTASSIUM SERPL-SCNC: 4.2 MMOL/L (ref 3.5–5.3)
PROINSULIN P 12H FAST SERPL-SCNC: 5.7 PMOL/L
PROT SERPL-MCNC: 4.5 G/DL (ref 6.4–8.2)
PROT SERPL-MCNC: 4.9 G/DL (ref 6.4–8.2)
PTH RELATED PROT SERPL-SCNC: <0.4 PMOL/L
RBC # BLD AUTO: 3.26 X10*6/UL (ref 4–5.2)
SAO2 % BLDA: 95 % (ref 94–100)
SAO2 % BLDA: 99 % (ref 94–100)
SODIUM BLDA-SCNC: 133 MMOL/L (ref 136–145)
SODIUM BLDA-SCNC: 136 MMOL/L (ref 136–145)
SODIUM SERPL-SCNC: 144 MMOL/L (ref 136–145)
SODIUM SERPL-SCNC: 145 MMOL/L (ref 136–145)
UFH PPP CHRO-ACNC: 0.6 IU/ML
UFH PPP CHRO-ACNC: 0.6 IU/ML
UFH PPP CHRO-ACNC: 0.9 IU/ML
VANCOMYCIN SERPL-MCNC: 19.1 UG/ML (ref 5–20)
WBC # BLD AUTO: 13.8 X10*3/UL (ref 4.4–11.3)

## 2024-06-07 PROCEDURE — 84132 ASSAY OF SERUM POTASSIUM: CPT

## 2024-06-07 PROCEDURE — 87081 CULTURE SCREEN ONLY: CPT

## 2024-06-07 PROCEDURE — 84132 ASSAY OF SERUM POTASSIUM: CPT | Mod: 91

## 2024-06-07 PROCEDURE — 2500000001 HC RX 250 WO HCPCS SELF ADMINISTERED DRUGS (ALT 637 FOR MEDICARE OP)

## 2024-06-07 PROCEDURE — 99222 1ST HOSP IP/OBS MODERATE 55: CPT

## 2024-06-07 PROCEDURE — 36415 COLL VENOUS BLD VENIPUNCTURE: CPT

## 2024-06-07 PROCEDURE — 2500000004 HC RX 250 GENERAL PHARMACY W/ HCPCS (ALT 636 FOR OP/ED)

## 2024-06-07 PROCEDURE — 1200000002 HC GENERAL ROOM WITH TELEMETRY DAILY

## 2024-06-07 PROCEDURE — C1751 CATH, INF, PER/CENT/MIDLINE: HCPCS

## 2024-06-07 PROCEDURE — 87040 BLOOD CULTURE FOR BACTERIA: CPT

## 2024-06-07 PROCEDURE — 85520 HEPARIN ASSAY: CPT

## 2024-06-07 PROCEDURE — 94660 CPAP INITIATION&MGMT: CPT

## 2024-06-07 PROCEDURE — 83880 ASSAY OF NATRIURETIC PEPTIDE: CPT

## 2024-06-07 PROCEDURE — 2500000005 HC RX 250 GENERAL PHARMACY W/O HCPCS

## 2024-06-07 PROCEDURE — 83735 ASSAY OF MAGNESIUM: CPT

## 2024-06-07 PROCEDURE — 84100 ASSAY OF PHOSPHORUS: CPT | Mod: 91

## 2024-06-07 PROCEDURE — 36569 INSJ PICC 5 YR+ W/O IMAGING: CPT

## 2024-06-07 PROCEDURE — 71045 X-RAY EXAM CHEST 1 VIEW: CPT

## 2024-06-07 PROCEDURE — 93005 ELECTROCARDIOGRAM TRACING: CPT

## 2024-06-07 PROCEDURE — 80202 ASSAY OF VANCOMYCIN: CPT

## 2024-06-07 PROCEDURE — 84075 ASSAY ALKALINE PHOSPHATASE: CPT

## 2024-06-07 PROCEDURE — 2500000002 HC RX 250 W HCPCS SELF ADMINISTERED DRUGS (ALT 637 FOR MEDICARE OP, ALT 636 FOR OP/ED)

## 2024-06-07 PROCEDURE — 82947 ASSAY GLUCOSE BLOOD QUANT: CPT | Mod: 91

## 2024-06-07 PROCEDURE — 84100 ASSAY OF PHOSPHORUS: CPT

## 2024-06-07 PROCEDURE — 71045 X-RAY EXAM CHEST 1 VIEW: CPT | Performed by: RADIOLOGY

## 2024-06-07 PROCEDURE — 2780000003 HC OR 278 NO HCPCS

## 2024-06-07 PROCEDURE — 85025 COMPLETE CBC W/AUTO DIFF WBC: CPT

## 2024-06-07 PROCEDURE — 99233 SBSQ HOSP IP/OBS HIGH 50: CPT

## 2024-06-07 PROCEDURE — 83735 ASSAY OF MAGNESIUM: CPT | Mod: 91

## 2024-06-07 PROCEDURE — 94660 CPAP INITIATION&MGMT: CPT | Mod: MUE

## 2024-06-07 PROCEDURE — 82248 BILIRUBIN DIRECT: CPT

## 2024-06-07 PROCEDURE — 99231 SBSQ HOSP IP/OBS SF/LOW 25: CPT | Performed by: STUDENT IN AN ORGANIZED HEALTH CARE EDUCATION/TRAINING PROGRAM

## 2024-06-07 PROCEDURE — 83605 ASSAY OF LACTIC ACID: CPT | Mod: 91

## 2024-06-07 PROCEDURE — 84484 ASSAY OF TROPONIN QUANT: CPT

## 2024-06-07 PROCEDURE — 82565 ASSAY OF CREATININE: CPT

## 2024-06-07 RX ORDER — LIDOCAINE HYDROCHLORIDE 10 MG/ML
5 INJECTION INFILTRATION; PERINEURAL ONCE
Status: DISCONTINUED | OUTPATIENT
Start: 2024-06-07 | End: 2024-06-15 | Stop reason: HOSPADM

## 2024-06-07 RX ORDER — DEXTROSE 50 % IN WATER (D50W) INTRAVENOUS SYRINGE
25 ONCE
Status: COMPLETED | OUTPATIENT
Start: 2024-06-07 | End: 2024-06-07

## 2024-06-07 RX ORDER — MYCOPHENOLATE MOFETIL 200 MG/ML
500 POWDER, FOR SUSPENSION ORAL 2 TIMES DAILY
Status: DISCONTINUED | OUTPATIENT
Start: 2024-06-07 | End: 2024-06-15 | Stop reason: HOSPADM

## 2024-06-07 RX ORDER — FUROSEMIDE 10 MG/ML
40 INJECTION INTRAMUSCULAR; INTRAVENOUS ONCE
Status: COMPLETED | OUTPATIENT
Start: 2024-06-07 | End: 2024-06-07

## 2024-06-07 RX ORDER — DEXTROSE MONOHYDRATE AND SODIUM CHLORIDE 5; .9 G/100ML; G/100ML
50 INJECTION, SOLUTION INTRAVENOUS CONTINUOUS
Status: DISCONTINUED | OUTPATIENT
Start: 2024-06-07 | End: 2024-06-08

## 2024-06-07 RX ORDER — VANCOMYCIN HYDROCHLORIDE 500 MG/100ML
500 INJECTION, SOLUTION INTRAVENOUS ONCE
Status: COMPLETED | OUTPATIENT
Start: 2024-06-07 | End: 2024-06-07

## 2024-06-07 RX ADMIN — MYCOPHENOLATE MOFETIL 500 MG: 200 POWDER, FOR SUSPENSION ORAL at 09:54

## 2024-06-07 RX ADMIN — MEROPENEM 1 G: 1 INJECTION, POWDER, FOR SOLUTION INTRAVENOUS at 12:05

## 2024-06-07 RX ADMIN — DEXTROSE AND SODIUM CHLORIDE 50 ML/HR: 5; 900 INJECTION, SOLUTION INTRAVENOUS at 13:36

## 2024-06-07 RX ADMIN — DEXTROSE AND SODIUM CHLORIDE 50 ML/HR: 5; 900 INJECTION, SOLUTION INTRAVENOUS at 13:43

## 2024-06-07 RX ADMIN — MEROPENEM 1 G: 1 INJECTION, POWDER, FOR SOLUTION INTRAVENOUS at 22:57

## 2024-06-07 RX ADMIN — CARVEDILOL 6.25 MG: 6.25 TABLET, FILM COATED ORAL at 09:55

## 2024-06-07 RX ADMIN — DEXTROSE MONOHYDRATE 12.5 G: 25 INJECTION, SOLUTION INTRAVENOUS at 09:21

## 2024-06-07 RX ADMIN — PREDNISONE 5 MG: 5 TABLET ORAL at 09:54

## 2024-06-07 RX ADMIN — FUROSEMIDE 40 MG: 10 INJECTION, SOLUTION INTRAVENOUS at 11:24

## 2024-06-07 RX ADMIN — HEPARIN SODIUM 1100 UNITS/HR: 10000 INJECTION, SOLUTION INTRAVENOUS at 02:51

## 2024-06-07 RX ADMIN — VANCOMYCIN HYDROCHLORIDE 500 MG: 500 INJECTION, SOLUTION INTRAVENOUS at 14:56

## 2024-06-07 RX ADMIN — DEXTROSE MONOHYDRATE 25 G: 25 INJECTION, SOLUTION INTRAVENOUS at 11:17

## 2024-06-07 RX ADMIN — LEVOTHYROXINE SODIUM 50 MCG: 0.05 TABLET ORAL at 06:51

## 2024-06-07 RX ADMIN — MICAFUNGIN SODIUM 100 MG: 100 INJECTION, POWDER, LYOPHILIZED, FOR SOLUTION INTRAVENOUS at 12:35

## 2024-06-07 RX ADMIN — Medication 50 L/MIN: at 21:30

## 2024-06-07 RX ADMIN — HEPARIN SODIUM 1100 UNITS/HR: 10000 INJECTION, SOLUTION INTRAVENOUS at 06:26

## 2024-06-07 RX ADMIN — SENNOSIDES AND DOCUSATE SODIUM 2 TABLET: 50; 8.6 TABLET ORAL at 09:55

## 2024-06-07 RX ADMIN — DESVENLAFAXINE 100 MG: 100 TABLET, EXTENDED RELEASE ORAL at 09:53

## 2024-06-07 RX ADMIN — CYCLOSPORINE 100 MG: 100 SOLUTION ORAL at 10:18

## 2024-06-07 RX ADMIN — MYCOPHENOLATE MOFETIL 500 MG: 200 POWDER, FOR SUSPENSION ORAL at 03:13

## 2024-06-07 RX ADMIN — POLYETHYLENE GLYCOL 3350 17 G: 17 POWDER, FOR SOLUTION ORAL at 09:53

## 2024-06-07 RX ADMIN — FUROSEMIDE 40 MG: 10 INJECTION, SOLUTION INTRAMUSCULAR; INTRAVENOUS at 18:24

## 2024-06-07 RX ADMIN — MEROPENEM 1 G: 1 INJECTION, POWDER, FOR SOLUTION INTRAVENOUS at 00:21

## 2024-06-07 ASSESSMENT — COGNITIVE AND FUNCTIONAL STATUS - GENERAL
EATING MEALS: A LOT
DAILY ACTIVITIY SCORE: 11
PERSONAL GROOMING: A LOT
CLIMB 3 TO 5 STEPS WITH RAILING: TOTAL
DRESSING REGULAR UPPER BODY CLOTHING: A LOT
HELP NEEDED FOR BATHING: A LOT
MOVING TO AND FROM BED TO CHAIR: TOTAL
MOBILITY SCORE: 8
TURNING FROM BACK TO SIDE WHILE IN FLAT BAD: A LOT
STANDING UP FROM CHAIR USING ARMS: TOTAL
DRESSING REGULAR LOWER BODY CLOTHING: TOTAL
MOVING FROM LYING ON BACK TO SITTING ON SIDE OF FLAT BED WITH BEDRAILS: A LOT
TOILETING: A LOT
WALKING IN HOSPITAL ROOM: TOTAL

## 2024-06-07 ASSESSMENT — PAIN - FUNCTIONAL ASSESSMENT
PAIN_FUNCTIONAL_ASSESSMENT: 0-10
PAIN_FUNCTIONAL_ASSESSMENT: 0-10

## 2024-06-07 ASSESSMENT — PAIN SCALES - GENERAL
PAINLEVEL_OUTOF10: 0 - NO PAIN

## 2024-06-07 NOTE — PROGRESS NOTES
Vancomycin Dosing by Pharmacy- FOLLOW UP    Lea Paulino is a 69 y.o. year old female who Pharmacy has been consulted for vancomycin dosing for other Urinary Tract Infection . Based on the patient's indication and renal status this patient is being dosed based on a goal trough/random level of 15-20.     Renal function is currently stable.    Current vancomycin dose: 500 mg given once    Most recent random level: 19.1 mcg/mL    Visit Vitals  /75   Pulse 76   Temp 36.7 °C (98.1 °F)   Resp 20        Lab Results   Component Value Date    CREATININE 1.79 (H) 2024    CREATININE 1.79 (H) 2024    CREATININE 1.68 (H) 2024    CREATININE 1.45 (H) 2024    CREATININE 1.50 2023        Patient weight is as follows:   Vitals:    24 0400   Weight: 97.8 kg (215 lb 9.8 oz)       Cultures:  No results found for the encounter in last 14 days.       I/O last 3 completed shifts:  In: 1139.9 (11.7 mL/kg) [P.O.:358; I.V.:481.9 (4.9 mL/kg); IV Piggyback:300]  Out: 1290 (13.2 mL/kg) [Urine:1290 (0.4 mL/kg/hr)]  Weight: 97.8 kg   I/O during current shift:  I/O this shift:  In: 200 [IV Piggyback:200]  Out: 1100 [Urine:1100]    Temp (24hrs), Av.7 °C (98.1 °F), Min:36.3 °C (97.3 °F), Max:37.1 °C (98.8 °F)      Assessment/Plan    Within goal random/trough level    This dosing regimen is predicted by [employer] to result in the following pharmacokinetic parameters:  The next level will be obtained on  at am lab. May be obtained sooner if clinically indicated.   Will continue to monitor renal function daily while on vancomycin and order serum creatinine at least every 48 hours if not already ordered.  Follow for continued vancomycin needs, clinical response, and signs/symptoms of toxicity.       MARY DOSS

## 2024-06-07 NOTE — SIGNIFICANT EVENT
Significant Event Note    From ID standpoint, PICC line would be okay if patient needs it.     Discussed with Dr. Bailey. Please see another note (Dr. Bailey) regarding clinical recommendations.     Veronica Nayak MD  ID fellow [id number]  Team B Pager [phone]

## 2024-06-07 NOTE — NURSING NOTE
End of shift event: 6/7/24: 0900    During morning assessment, patient appeared lethargic, but responds when questions were being asked. BS noted to be 61, D5 given. BS recheck, it was 137. Rapid response called and came to bedside. Patient was placed airvo on 50L 40% FIO2. At this time, currently monitoring ABG'S.     Flavia Ryder RN

## 2024-06-07 NOTE — PROGRESS NOTES
"Lea Paulino is a 69 y.o. female on day 4 of admission presenting with Pyelonephritis.    Subjective   Patient seen this morning at bedside. AOX1, but comfortable appearing. Later notified by RN while rounding that patient was belly breathing and more lethargic. RR called. On initial evaluation, patient diaphoretic with use of abdominal accessory muscles, very edematous in arms. AOx1, but somnolent. Vitals stable, HR in 70s, SBP 120s, SpO2 98%. BS 81, given one amp D50. ECG unchanged from prior. CXR w/ similar appearance of right pleural effusion with adjacent atelectasis/consolidation and interval development of a small left pleural effusion with adjacent atelectasis. ABG pH 7.36/PCO2 46/PaO2 91/SO2 99/lactate 0.4. Given 40 mg IV lasix. Patient became progressively more lethargic throughout, did not flinch/pull away when drawing ABG. Started micafungin and ordered D5 NS for persistent hypoglycemia. Patient placed on BiPAP initially, then airvo 15L, 40%.    Became slightly more responsive with amp D50 and BiPAP. Withdrawing to pain. Answered some questions. Repeat ABG later with pH 7.3/PCO2 56/PaO2 68/SO2 95/lactate 0.6.     Later, woke up with minimal verbal/physical stimuli. Oriented to name, year, location (hospital).    Objective     Physical Exam  Constitutional: ill-appearing, somnolent  HEENT: EOMI, no scleral icterus, MMM  CV: RRR, murmur heard best at right upper sternal border   Pulm: limited air movement, no crackles noted  GI: Soft, NT, ND  Extremities: 1+ pitting edema to knees bilaterally, very edematous in bilateral upper extremities  Skin: warm  Neuro: woke up with minimal verbal/physical stimuli. Oriented to name, year, location (hospital)    Last Recorded Vitals  Blood pressure 114/64, pulse 77, temperature 36.6 °C (97.9 °F), resp. rate 18, height 1.575 m (5' 2.01\"), weight 97.8 kg (215 lb 9.8 oz), SpO2 98%.  Intake/Output last 3 Shifts:  I/O last 3 completed shifts:  In: 1239.9 (12.7 " mL/kg) [P.O.:358; I.V.:481.9 (4.9 mL/kg); IV Piggyback:400]  Out: 2090 (21.4 mL/kg) [Urine:2090 (0.6 mL/kg/hr)]  Weight: 97.8 kg     Relevant Results  Results for orders placed or performed during the hospital encounter of 06/03/24 (from the past 24 hour(s))   POCT GLUCOSE   Result Value Ref Range    POCT Glucose 97 74 - 99 mg/dL   Heparin Assay, UFH   Result Value Ref Range    Heparin Unfractionated 0.9 See Comment Below for Therapeutic Ranges IU/mL   CBC and Auto Differential   Result Value Ref Range    WBC 13.8 (H) 4.4 - 11.3 x10*3/uL    nRBC 0.0 0.0 - 0.0 /100 WBCs    RBC 3.26 (L) 4.00 - 5.20 x10*6/uL    Hemoglobin 11.3 (L) 12.0 - 16.0 g/dL    Hematocrit 37.8 36.0 - 46.0 %     (H) 80 - 100 fL    MCH 34.7 (H) 26.0 - 34.0 pg    MCHC 29.9 (L) 32.0 - 36.0 g/dL    RDW 17.1 (H) 11.5 - 14.5 %    Platelets 115 (L) 150 - 450 x10*3/uL    Neutrophils % 85.8 40.0 - 80.0 %    Immature Granulocytes %, Automated 1.3 (H) 0.0 - 0.9 %    Lymphocytes % 3.4 13.0 - 44.0 %    Monocytes % 7.7 2.0 - 10.0 %    Eosinophils % 1.7 0.0 - 6.0 %    Basophils % 0.1 0.0 - 2.0 %    Neutrophils Absolute 11.86 (H) 1.20 - 7.70 x10*3/uL    Immature Granulocytes Absolute, Automated 0.18 0.00 - 0.70 x10*3/uL    Lymphocytes Absolute 0.47 (L) 1.20 - 4.80 x10*3/uL    Monocytes Absolute 1.06 (H) 0.10 - 1.00 x10*3/uL    Eosinophils Absolute 0.23 0.00 - 0.70 x10*3/uL    Basophils Absolute 0.02 0.00 - 0.10 x10*3/uL   Comprehensive metabolic panel   Result Value Ref Range    Glucose 47 (LL) 74 - 99 mg/dL    Sodium 144 136 - 145 mmol/L    Potassium 4.1 3.5 - 5.3 mmol/L    Chloride 109 (H) 98 - 107 mmol/L    Bicarbonate 25 21 - 32 mmol/L    Anion Gap 14 10 - 20 mmol/L    Urea Nitrogen 36 (H) 6 - 23 mg/dL    Creatinine 1.79 (H) 0.50 - 1.05 mg/dL    eGFR 31 (L) >60 mL/min/1.73m*2    Calcium 8.3 (L) 8.6 - 10.6 mg/dL    Albumin 2.6 (L) 3.4 - 5.0 g/dL    Alkaline Phosphatase 149 (H) 33 - 136 U/L    Total Protein 4.9 (L) 6.4 - 8.2 g/dL     (H) 9 - 39  U/L    Bilirubin, Total 0.9 0.0 - 1.2 mg/dL    ALT 59 (H) 7 - 45 U/L   Phosphorus   Result Value Ref Range    Phosphorus 2.6 2.5 - 4.9 mg/dL   Magnesium   Result Value Ref Range    Magnesium 2.09 1.60 - 2.40 mg/dL   Vancomycin   Result Value Ref Range    Vancomycin 19.1 5.0 - 20.0 ug/mL   Heparin Assay, UFH   Result Value Ref Range    Heparin Unfractionated 0.6 See Comment Below for Therapeutic Ranges IU/mL   POCT GLUCOSE   Result Value Ref Range    POCT Glucose 61 (L) 74 - 99 mg/dL   POCT GLUCOSE   Result Value Ref Range    POCT Glucose 127 (H) 74 - 99 mg/dL   POCT GLUCOSE   Result Value Ref Range    POCT Glucose 81 74 - 99 mg/dL   Blood Gas Arterial Full Panel   Result Value Ref Range    POCT pH, Arterial 7.36 (L) 7.38 - 7.42 pH    POCT pCO2, Arterial 46 (H) 38 - 42 mm Hg    POCT pO2, Arterial 91 85 - 95 mm Hg    POCT SO2, Arterial 99 94 - 100 %    POCT Oxy Hemoglobin, Arterial 96.1 94.0 - 98.0 %    POCT Hematocrit Calculated, Arterial 33.0 (L) 36.0 - 46.0 %    POCT Sodium, Arterial 133 (L) 136 - 145 mmol/L    POCT Potassium, Arterial 3.9 3.5 - 5.3 mmol/L    POCT Chloride, Arterial 107 98 - 107 mmol/L    POCT Ionized Calcium, Arterial 1.15 1.10 - 1.33 mmol/L    POCT Glucose, Arterial 562 (HH) 74 - 99 mg/dL    POCT Lactate, Arterial 0.4 0.4 - 2.0 mmol/L    POCT Base Excess, Arterial 0.2 -2.0 - 3.0 mmol/L    POCT HCO3 Calculated, Arterial 26.0 22.0 - 26.0 mmol/L    POCT Hemoglobin, Arterial 10.9 (L) 12.0 - 16.0 g/dL    POCT Anion Gap, Arterial 4 (L) 10 - 25 mmo/L    Patient Temperature 37.0 degrees Celsius    FiO2 32 %   POCT GLUCOSE   Result Value Ref Range    POCT Glucose 173 (H) 74 - 99 mg/dL   Heparin Assay, UFH   Result Value Ref Range    Heparin Unfractionated 0.6 See Comment Below for Therapeutic Ranges IU/mL   Blood Culture    Specimen: Peripheral Venipuncture; Blood culture   Result Value Ref Range    Blood Culture Loaded on Instrument - Culture in progress    Magnesium   Result Value Ref Range    Magnesium  2.01 1.60 - 2.40 mg/dL   Lactate   Result Value Ref Range    Lactate 0.6 0.4 - 2.0 mmol/L   Hepatic function panel   Result Value Ref Range    Albumin 2.5 (L) 3.4 - 5.0 g/dL    Bilirubin, Total 1.1 0.0 - 1.2 mg/dL    Bilirubin, Direct 0.6 (H) 0.0 - 0.3 mg/dL    Alkaline Phosphatase 161 (H) 33 - 136 U/L    ALT 60 (H) 7 - 45 U/L     (H) 9 - 39 U/L    Total Protein 4.5 (L) 6.4 - 8.2 g/dL   Troponin I, High Sensitivity   Result Value Ref Range    Troponin I, High Sensitivity 53 (H) 0 - 34 ng/L   B-type natriuretic peptide   Result Value Ref Range     (H) 0 - 99 pg/mL   Phosphorus   Result Value Ref Range    Phosphorus 2.7 2.5 - 4.9 mg/dL   Basic Metabolic Panel   Result Value Ref Range    Glucose 149 (H) 74 - 99 mg/dL    Sodium 145 136 - 145 mmol/L    Potassium 4.2 3.5 - 5.3 mmol/L    Chloride 107 98 - 107 mmol/L    Bicarbonate 28 21 - 32 mmol/L    Anion Gap 14 10 - 20 mmol/L    Urea Nitrogen 37 (H) 6 - 23 mg/dL    Creatinine 1.79 (H) 0.50 - 1.05 mg/dL    eGFR 31 (L) >60 mL/min/1.73m*2    Calcium 7.9 (L) 8.6 - 10.6 mg/dL   POCT GLUCOSE   Result Value Ref Range    POCT Glucose 108 (H) 74 - 99 mg/dL   POCT GLUCOSE   Result Value Ref Range    POCT Glucose 109 (H) 74 - 99 mg/dL   Blood Gas Arterial Full Panel   Result Value Ref Range    POCT pH, Arterial 7.30 (L) 7.38 - 7.42 pH    POCT pCO2, Arterial 56 (H) 38 - 42 mm Hg    POCT pO2, Arterial 68 (L) 85 - 95 mm Hg    POCT SO2, Arterial 95 94 - 100 %    POCT Oxy Hemoglobin, Arterial 92.4 (L) 94.0 - 98.0 %    POCT Hematocrit Calculated, Arterial 34.0 (L) 36.0 - 46.0 %    POCT Sodium, Arterial 136 136 - 145 mmol/L    POCT Potassium, Arterial 4.0 3.5 - 5.3 mmol/L    POCT Chloride, Arterial 108 (H) 98 - 107 mmol/L    POCT Ionized Calcium, Arterial 1.09 (L) 1.10 - 1.33 mmol/L    POCT Glucose, Arterial 116 (H) 74 - 99 mg/dL    POCT Lactate, Arterial 0.6 0.4 - 2.0 mmol/L    POCT Base Excess, Arterial 0.3 -2.0 - 3.0 mmol/L    POCT HCO3 Calculated, Arterial 27.6 (H) 22.0  - 26.0 mmol/L    POCT Hemoglobin, Arterial 11.3 (L) 12.0 - 16.0 g/dL    POCT Anion Gap, Arterial 4 (L) 10 - 25 mmo/L    Patient Temperature 37.0 degrees Celsius    FiO2 40 %   POCT GLUCOSE   Result Value Ref Range    POCT Glucose 101 (H) 74 - 99 mg/dL        Assessment/Plan   Principal Problem:    Pyelonephritis    Lea Paulino is a 69 y.o. female with a history of COPD, renal transplant in 2015 on cyclosporine, mycophenolate and prednisone, HTN, HLD, hypothyroid, hyperparathyroid, HFmEF (45-50%), AFib, PVD, lymphedema, admitted for emphysematous pyelonephritis and acute pancreatitis in the setting of severe hypercalcemia. Initially treated with IV fluids and abx. Transferred to MICU 6/4 for AHRF s/t pulmonary edema and intubated 6/4-6/5 for airway protection. Extubated 6/5, stable on 2.5L transferred back to floor. Transplant ID and nephrology following patient.    6/7 Patient seen this morning at bedside. AOX1, but comfortable appearing. Later notified by RN while rounding that patient was belly breathing and more lethargic. RR called. On initial evaluation, patient diaphoretic with use of abdominal accessory muscles, very edematous in arms. AOx1, but somnolent. Vitals stable, HR in 70s, SBP 120s, SpO2 98%. BS 81, given one amp D50. ECG unchanged from prior. CXR w/ similar appearance of right pleural effusion with adjacent atelectasis/consolidation and interval development of a small left pleural effusion with adjacent atelectasis. ABG pH 7.36/PCO2 46/PaO2 91/SO2 99/lactate 0.4. Given 40 mg IV lasix. Patient became progressively more lethargic throughout, did not flinch/pull away when drawing ABG. Started micafungin and ordered D5 NS for persistent hypoglycemia. Patient placed on BiPAP initially, then airvo 15L, 40%.    Became slightly more responsive with amp D50 and BiPAP. Withdrawing to pain. Answered some questions. Repeat ABG later with pH 7.3/PCO2 56/PaO2 68/SO2 95/lactate 0.6.     Later, woke up  with minimal verbal/physical stimuli. Oriented to name, year, location (hospital).    Updates:  -PICC placed  -c/w D5 Ns at 50 ml/hr  -heparin gtt running  -given additional 40 mg IV lasix  -on airvo 15L, 40%   -endocrine consulted    #Lethargy  #AMS  -suspected to be s/t hypoglycemia +/- pulmonary edema  -repeat Cmp, cbc, Mg unremarkable  -ECG unchanged from prior  -CXR w/ similar appearance of right pleural effusion with adjacent atelectasis/consolidation and interval development of a small left pleural effusion with adjacent atelectasis  -ABG pH 7.36/PCO2 46/PaO2 91/SO2 99/lactate 0.4.   -s/p 40 mg IV lasix, plan for additional 40 mg   -c/w micafungin  -c/w D5 NS 50 ml/hr  -f/up blood cultures  -q4 glucose checks    #Hypercalcemia, improving  :: ddx primary hyperparathyroidism w/ remaining parathyroid tissue vs less likely MEN1 given possible recurrent, significant hypoglycemia concerning for insulinoma  :: s/p subtotal hemithyroidectomy & parathyroidectomy in 2006   :: Calcium 15.9 at [facility]  :: Potentially the culprit for her presentation of AMS/confusion, pancreatitis, vomiting, large stool burden and constipation, and kidney stones  :: Given severity, was treated at [facility] with [place] calcitonin 4 units/kg subcutaneously and high-volume IV normal saline   - iPTH appropriately low on admission, repeat elevated at 204  -Vitamin D 25 and D-1,25 levels WNL, pending PTHrp and SPEP, UPEP   -daily ical, phos and mag levels   -hold home calcitriol  -endocrine consulted f/up recs     #Emphysematous pyelonephritis  :: CT A/P with gas in the RLQ transplant kidney collecting system and in the urinary bladder, possible gas-forming infection  :: s/p vanc/zosyn and meropenem/gentamicin at OSH  :: previously grew pan-sensitive Pseudomonas (4/24) and E. faecalis (resistant to gentamicin, intermediate to doxycycline 1/22)  -Bcx 6/3 NGTD  -Ucx 6/3 and 6/6 NGTD  -Transplant ID consulted, appreciate recs:  -c/w  meropenem/vanc  -consider urology c/s for nephrostomy tube if becomes hemodynamically unstable    #Acute pancreatitis  :: likely secondary to hypercalcemia (15.9) vs. medications including cyclosporine (less likely)  :: Lipase 1039, CT findings supportive, low back pain initial presentation    - triglycerides obtained 82  - Pain control with IV medication  - Follow-up random cyclosporine levels  - Strict I/O  - can repeat CT in a few weeks regarding fluid collections    #LAURA on CKD of transplanted kidney (2015), improving  :: Follows with Dr Gay at  for renal transplant   :: On mycophenolate, cyclosporine, prednisone  :: Creatinine on admission to [facility] 2.7 (baseline 1.5-1.7)  :: LAURA likely prerenal in setting of pancreatitis and hypovolemia  -Transplant nephrology following  -continue cyclosporine 100 BID, prednisone 5 mg daily, decrease MMF to 500 BID (home dose 1 g daily)  -send EBV/CMV and BK viral loads    #AHRF s/t pulmonary edema, resolved  #COPD  -intubated in MICU 6/4-6/4, s/t excessive fluid resuscitation for pancreatitis  -on 2.5 L NC  -c/w home inhalers    #Reported nonsustained VT at [facility]  #Hypertension  #HFmrEF  - home metoprolol tartrate 12.5 BID switched to coreg 6.25 mg BID  - Optimize electrolytes K > 4, Mg > 2  - EKG 6/4 -> NSR, Left axis deviation  - Holding Bumex 1 mg for now  - Diurese prn   - Echo- EF 40-45%     #Bipolar Disorder  -Psych consulted   -resume home desvenlafaxine 100mg  -start home clozapine at 25 mg at night, starting 6/7 increase to home dose of 50 mg     #Afib  #Hx of DVT LLE 2022  -c/w coreg 6.25 mg BID  -holding home eliquis, on heparin gtt     #Hypothyroidism  - Continue levothyroxine    F: diuresing  E: replete prn, monitor Ca  N: regular  A: PIV  DVT ppx: heparin gtt for afib    Code status: full  NOK: Son  [phone]     Opal Chanel MD

## 2024-06-07 NOTE — SIGNIFICANT EVENT
RAPID RESPONSE RN NOTE:       06/07/24 1100   Onset Documentation   Rapid Response Initiated By RN;Physician   Location/Room [facility] [place], [place]  ([place] [id number])   Pager Time 1100   Arrival Time 1105   Event End Time 1230   Level II Called No   Primary Reason for Call Change in mental status;Staff concern     Rapid response paged for altered mental status/increased lethargy.    On my arrival, primary team at bedside. ABG drawn on nasal cannula - ph 7.36 pCO2 46, pO2 91.  Pt arouses to verbal stimuli and able to verbalize name, oriented to self only.  No focal deficits noted.  Vital signs unremarkable. Maintaining pulse ox on 3LNC with no desats per team.     EKG and CXR completed. 40mg IV Lasix given.  Additional PIV access obtained and full panel of labs sent including blood culture x 1.  Pt with intermittent episodes of hypoglycemia per team. BS = 81 at time of rapid page. D5NS drip ordered per team. Micafungin IV added to regimin.     Pt initially placed on BIPAP per primary team.  MICU fellow Jason consulted and arrived to bedside for eval.  Recommending AIRVO trial.  Pt placed on AIRVO 50L/40% FiO2 by respiratory therapist and tolerating well. Continuous pulse ox applied.     Of note, pt with bilat pitting edema in arms and limited to use of right arm for venipuncture; very difficult stick.   Able to place one additional PIV to right arm under ultrasound guidance. Did not visualize any other suitable veins.  Primary team aware and PICC/midline ordered due to need for multiple lines for continuous heparin drip, D5 drip and intermittent antibiotics.     Current plan is to monitor response to interventions. Primary team and bedside RN encouraged to page rapid response for any concerns for deterioration or assistance needed.

## 2024-06-07 NOTE — CARE PLAN
The patient's goals for the shift include will be safe and free from falls and injury through out the shift    The clinical goals for the shift include patient will remain normoglycemic this shift    Problem: Skin  Goal: Participates in plan/prevention/treatment measures  Flowsheets (Taken 6/7/2024 1514)  Participates in plan/prevention/treatment measures:   Discuss with provider PT/OT consult   Elevate heels   Increase activity/out of bed for meals  Goal: Prevent/manage excess moisture  Flowsheets (Taken 6/7/2024 1514)  Prevent/manage excess moisture:   Cleanse incontinence/protect with barrier cream   Moisturize dry skin   Monitor for/manage infection if present  Goal: Prevent/minimize sheer/friction injuries  Flowsheets (Taken 6/7/2024 1514)  Prevent/minimize sheer/friction injuries:   Complete micro-shifts as needed if patient unable. Adjust patient position to relieve pressure points, not a full turn   Turn/reposition every 2 hours/use positioning/transfer devices   Utilize specialty bed per algorithm   Use pull sheet  Goal: Promote/optimize nutrition  Flowsheets (Taken 6/7/2024 1514)  Promote/optimize nutrition:   Assist with feeding   Consume > 50% meals/supplements   Monitor/record intake including meals   Offer water/supplements/favorite foods  Goal: Promote skin healing  Flowsheets (Taken 6/7/2024 1514)  Promote skin healing:   Assess skin/pad under line(s)/device(s)   Turn/reposition every 2 hours/use positioning/transfer devices   Protective dressings over bony prominences       Over the shift, the patient did not make progress toward the following goals. Barriers to progression include AMS/ lethargy . Recommendations to address these barriers include monitoring ABG and blood glucose level.

## 2024-06-07 NOTE — SIGNIFICANT EVENT
Spoke to patient's son Cesar who is surrogate decision maker. Discussed PICC line placement including why it was needed, benefits, alternatives and risks. He expressed understanding and consented to procedure.

## 2024-06-07 NOTE — POST-PROCEDURE NOTE
Pre-Procedure Checklist:  Emergent Line Insertion: No  Type of Line to be Placed: PICC  Consent Obtained: Yes  Emergency Medication Necessary: No  Patient Identified with 2 Independent Identifiers: Yes  Review of Allergies, Anticoagulation, Relevant Labs, ECG/Telemetry: Yes  Risks/Benefits/Alternatives Discussed with Patient/POA/Legal Representative: Yes  Stop Sign on Door: Yes  Time Out Performed: Yes  Catheter Exchange: No    Positioning Checklist:  All People, Including Patient, in the Room with Cap and Mask: Yes  Fluoroscopy Used to Identify Vessel and Guide Insertion: No   Sterile Cover Used: Yes  Full Barrier Precautions Followed (Mask, Cap, Gown, Gloves): Yes  Hands Washed: Yes  Monitors Attached with Sound Alarms On: No  Full Body Sterile Drape (Head-to-Toe) Used to Cover Patient: Yes  Trendelenburg Position (For IJ and Subclavian): No  CHG Skin Prep Used and Allowed to Air Dry to Skin Procedure: Yes    Procedure Checklist:  Blood Aspirated From All Lumens, All Ports Subsequently Flushed: Yes  Catheter Caps Placed on All Lumens; Lumens Clamped: Yes  Maintain Guidewire Control Throughout, Ensuring Guidewire Removal: Yes  Maintain Sterile Field Throughout Insertion: Yes  Catheter Secured: Yes  Confirmatory Test of Venous Placement: Non-Pulsatile Blood    Post Procedure Checklist:  Date and Time Written on Dressing: Yes  Sharp and Wire Count and Safe Disposal of all Sharps/Wires: Yes  Sterile Dressing Applied Per Protocol: Yes  X-ray Ordered or ECG Image: Yes    PICC Insertion Details:  Size (Fr): 4  Lumen Type:single  Catheter to Vein Ratio Less Than 50%: Yes  Total Length (cm): 39  External Length (cm): 0  Orientation: right  Location: basilic  Site Prep: Chlorohexidine; Usual sterile procedure followed  Local Anesthetic: Injectable/Subcutaneous  Indication:iv meds  Insertion Team Members in the Room: Nurse, LPN  Initial Extremity Circumference (cm):   Insertion Attempts:1  Patient Tolerance: Tolerated Well,  Age Appropriate  Comfort Measures: Subcutaneous anesthetic; Verbal  Procedure Location: Bedside  Safety Measures: Patient specific safety measures addressed with RN  Estimated Blood Loss (mL): 3  Vessel Fully Compressible Proximally and Distally to Insertion Site: Yes  Brisk Blood Return Obtained and Line Draws Easily: Yes  Tip Location: SVC  Line Confirmation: ECG  Lot #:[id number]  : Bard  PICC Line Exp Date:9/30/2025  Securement: Stat Lock  Post Procedure Checklist: Handoff with RN; Obtain all new IV tubing prior to use; Bed at lowest level and wheels locked; Line discharge information at bedside.  Additional Details: Line was inserted using Modified Seldinger's Technique.   Placed by: Olena Matos RN-[facility]

## 2024-06-07 NOTE — PROGRESS NOTES
06/07/24 1548   Discharge Planning   Living Arrangements Alone   Support Systems Children   Type of Residence Private residence   Home or Post Acute Services None       LISW attempted to call patient's son but was unable to reach him.  Patient is intermittently confused.  Patient came to [facility] from [facility].  Assessment was completed with SW at [facility]. Patent lives at the [facility]. Patient has elevator access.  Patient uses a rollator to walk but also have a walker, cane and shower chair. Patient has been to SNF and had HHC in the past.  LISW will attempt to call patient's son again at a later time.      CANDACE Urban, LISW-S

## 2024-06-07 NOTE — PROGRESS NOTES
Lea Paulino   69 y.o.    @WT@  MRN/Room: [id number]/5021-A    Subjective:   Patient seen and examined   Looks worse than yesterday - on HFNC   Pending ABG   BG WNL       Objective:     Meds:   carvedilol, 6.25 mg, BID  cycloSPORINE, 100 mg, q12h MARTIN  desvenlafaxine succinate, 100 mg, Daily  levothyroxine, 50 mcg, q AM  lidocaine, 5 mL, Once  meropenem, 1 g, q12h  micafungin, 100 mg, q24h  mycophenolate, 500 mg, BID  polyethylene glycol, 17 g, Daily  predniSONE, 5 mg, Daily  sennosides-docusate sodium, 2 tablet, BID  simvastatin, 40 mg, Nightly      D5 % and 0.9 % sodium chloride  heparin, Last Rate: 1,100 Units/hr (06/07/24 0626)      alteplase, 2 mg, PRN  dextrose, 12.5 g, q15 min PRN  dextrose, 25 g, q15 min PRN  glucagon, 1 mg, q15 min PRN  glucagon, 1 mg, q15 min PRN  vancomycin, , Daily PRN        Vitals:    06/07/24 1154   BP: 101/61   Pulse: 77   Resp:    Temp:    SpO2: 98%          Intake/Output Summary (Last 24 hours) at 6/7/2024 1226  Last data filed at 6/7/2024 1155  Gross per 24 hour   Intake 434 ml   Output 1135 ml   Net -701 ml       General appearance: no distress  Eyes: non-icteric  Skin: no apparent rash  Heart: regular  Lungs: CTA bilat + wheezing/crackles  Abdomen: soft, nt/nd  Extremities: + edema bilat  Neuro: No FND,asterixis      Blood Labs:  Results for orders placed or performed during the hospital encounter of 06/03/24 (from the past 24 hour(s))   Heparin Assay, UFH   Result Value Ref Range    Heparin Unfractionated 0.9 See Comment Below for Therapeutic Ranges IU/mL   POCT GLUCOSE   Result Value Ref Range    POCT Glucose 130 (H) 74 - 99 mg/dL   Heparin Assay, UFH   Result Value Ref Range    Heparin Unfractionated 1.0 See Comment Below for Therapeutic Ranges IU/mL   POCT GLUCOSE   Result Value Ref Range    POCT Glucose 97 74 - 99 mg/dL   Heparin Assay, UFH   Result Value Ref Range    Heparin Unfractionated 0.9 See Comment Below for Therapeutic Ranges IU/mL   CBC and Auto  Differential   Result Value Ref Range    WBC 13.8 (H) 4.4 - 11.3 x10*3/uL    nRBC 0.0 0.0 - 0.0 /100 WBCs    RBC 3.26 (L) 4.00 - 5.20 x10*6/uL    Hemoglobin 11.3 (L) 12.0 - 16.0 g/dL    Hematocrit 37.8 36.0 - 46.0 %     (H) 80 - 100 fL    MCH 34.7 (H) 26.0 - 34.0 pg    MCHC 29.9 (L) 32.0 - 36.0 g/dL    RDW 17.1 (H) 11.5 - 14.5 %    Platelets 115 (L) 150 - 450 x10*3/uL    Neutrophils % 85.8 40.0 - 80.0 %    Immature Granulocytes %, Automated 1.3 (H) 0.0 - 0.9 %    Lymphocytes % 3.4 13.0 - 44.0 %    Monocytes % 7.7 2.0 - 10.0 %    Eosinophils % 1.7 0.0 - 6.0 %    Basophils % 0.1 0.0 - 2.0 %    Neutrophils Absolute 11.86 (H) 1.20 - 7.70 x10*3/uL    Immature Granulocytes Absolute, Automated 0.18 0.00 - 0.70 x10*3/uL    Lymphocytes Absolute 0.47 (L) 1.20 - 4.80 x10*3/uL    Monocytes Absolute 1.06 (H) 0.10 - 1.00 x10*3/uL    Eosinophils Absolute 0.23 0.00 - 0.70 x10*3/uL    Basophils Absolute 0.02 0.00 - 0.10 x10*3/uL   Comprehensive metabolic panel   Result Value Ref Range    Glucose 47 (LL) 74 - 99 mg/dL    Sodium 144 136 - 145 mmol/L    Potassium 4.1 3.5 - 5.3 mmol/L    Chloride 109 (H) 98 - 107 mmol/L    Bicarbonate 25 21 - 32 mmol/L    Anion Gap 14 10 - 20 mmol/L    Urea Nitrogen 36 (H) 6 - 23 mg/dL    Creatinine 1.79 (H) 0.50 - 1.05 mg/dL    eGFR 31 (L) >60 mL/min/1.73m*2    Calcium 8.3 (L) 8.6 - 10.6 mg/dL    Albumin 2.6 (L) 3.4 - 5.0 g/dL    Alkaline Phosphatase 149 (H) 33 - 136 U/L    Total Protein 4.9 (L) 6.4 - 8.2 g/dL     (H) 9 - 39 U/L    Bilirubin, Total 0.9 0.0 - 1.2 mg/dL    ALT 59 (H) 7 - 45 U/L   Phosphorus   Result Value Ref Range    Phosphorus 2.6 2.5 - 4.9 mg/dL   Magnesium   Result Value Ref Range    Magnesium 2.09 1.60 - 2.40 mg/dL   Heparin Assay, UFH   Result Value Ref Range    Heparin Unfractionated 0.6 See Comment Below for Therapeutic Ranges IU/mL   POCT GLUCOSE   Result Value Ref Range    POCT Glucose 61 (L) 74 - 99 mg/dL   POCT GLUCOSE   Result Value Ref Range    POCT Glucose  127 (H) 74 - 99 mg/dL   POCT GLUCOSE   Result Value Ref Range    POCT Glucose 81 74 - 99 mg/dL   Blood Gas Arterial Full Panel   Result Value Ref Range    POCT pH, Arterial 7.36 (L) 7.38 - 7.42 pH    POCT pCO2, Arterial 46 (H) 38 - 42 mm Hg    POCT pO2, Arterial 91 85 - 95 mm Hg    POCT SO2, Arterial 99 94 - 100 %    POCT Oxy Hemoglobin, Arterial 96.1 94.0 - 98.0 %    POCT Hematocrit Calculated, Arterial 33.0 (L) 36.0 - 46.0 %    POCT Sodium, Arterial 133 (L) 136 - 145 mmol/L    POCT Potassium, Arterial 3.9 3.5 - 5.3 mmol/L    POCT Chloride, Arterial 107 98 - 107 mmol/L    POCT Ionized Calcium, Arterial 1.15 1.10 - 1.33 mmol/L    POCT Glucose, Arterial 562 (HH) 74 - 99 mg/dL    POCT Lactate, Arterial 0.4 0.4 - 2.0 mmol/L    POCT Base Excess, Arterial 0.2 -2.0 - 3.0 mmol/L    POCT HCO3 Calculated, Arterial 26.0 22.0 - 26.0 mmol/L    POCT Hemoglobin, Arterial 10.9 (L) 12.0 - 16.0 g/dL    POCT Anion Gap, Arterial 4 (L) 10 - 25 mmo/L    Patient Temperature 37.0 degrees Celsius    FiO2 32 %   POCT GLUCOSE   Result Value Ref Range    POCT Glucose 173 (H) 74 - 99 mg/dL   Heparin Assay, UFH   Result Value Ref Range    Heparin Unfractionated 0.6 See Comment Below for Therapeutic Ranges IU/mL              ASSESSMENT:  Lea Paulino is a 69 y.o.  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 On admission, labs notable for hypercalcemia and LAURA for which transplant nephrology is following.      #Allograft function  Date of transplant: 10/30/2015  Baseline Cr: 1.5-1.7, admitted with a Cr of 2.7, today Cr    UA: 1+ protein, 2+ blood, > 50 WBC's and 11-20 RBC's  Imaging: CT A/P without contrast - RLQ transplanted kidney with a tiny non obstructive stone. Other findings notable for inflammation of the pancreatic head. 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 Will see where her Cr settles.   - no acute indications for RRT at this moment  - on IV vancomycin and IV meropenem, transplant ID following   - Ucx negative. Repeat UCX pending.  However, at OSH Ucx +ve for GN rods + mixed edison    - Echo 40-45% EF, recommend one dose of 40 IVP lasix as there is some evidence of volume overload   - Recommend EBV, CMV and BK viral loads - pending   - avoid nephrotoxins, IV contrast and NSAIDs  - renally dose medications to CrCl      #Immunosuppression  - continue cyclosporine 100 BID   - continue  BID (home dose 1 g BID)  - continue prednisone 5 mg daily   - recommend cyclosporine levels     #Hemodynamics   - BP's reviewed and remain stable   - continue to monitor      #Electrolytes - hypercalcemia  - admission Ca 15.4, albumin 3.8, no phos levels  - iPTH appropriately low on admission, repeat 53.6  - Vitamin D 25 levels WNL, pending PTHrp and Vitamin D-1,25 OH WNL 21.9   - daily ical, phos and mag levels   - hold home calcitriol, s/p calcitonin x 1 dose at OSH  - overall improved      #CKD-Anemia   - Hgb 11.3, will monitor     #Acid-base   - WNL     Suyapa Duran DO  Nephrology Fellow   Daytime / Weekend Renal Pager [phone]  After 7 pm Emergencies [phone] Pager [phone]

## 2024-06-07 NOTE — CONSULTS
Wound Care Consult     Visit Date: 6/7/2024      Patient Name: Lea Paulino         MRN: [id number]           YOB: 1955     Reason for Consult: Assess BLE        Wound History: Lea Paulino is a 69 y.o. female with a history of COPD, renal transplant in 2015 on cyclosporine, mycophenolate and prednisone, HTN, HLD, hypothyroid, hyperparathyroid, HFmEF (45-50%), AFib, PVD, lymphedema, 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, presenting to the MICU in acute respiratory distress placed on BiPAP.      Pertinent Labs:   Albumin   Date Value Ref Range Status   06/07/2024 2.5 (L) 3.4 - 5.0 g/dL Final     Albumin %   Date Value Ref Range Status   06/03/2024 18.2 % Final       Wound Assessment: Bilateral lower extremities appear normal without any open wounds. There are 2 small red dots to RLE. This is intact skin. Wound team unable to find any open wounds or anything that needs to be addressed by the wound team. Spoke with RN who is also unaware of any wounds to the BLE.         Wound Team Plan: Wound team will defer this consult.     SUZANNE GERHARDT, RN, BSN, CWOCN  6/7/2024  6:26 PM

## 2024-06-07 NOTE — CARE PLAN
The patient's goals for the shift include      The clinical goals for the shift include patient will remain normoglycemic this shift      Problem: Pain  Goal: My pain/discomfort is manageable  Outcome: Progressing     Problem: Safety  Goal: Patient will be injury free during hospitalization  Outcome: Progressing  Goal: I will remain free of falls  Outcome: Progressing     Problem: Daily Care  Goal: Daily care needs are met  Outcome: Progressing     Problem: Psychosocial Needs  Goal: Demonstrates ability to cope with hospitalization/illness  Outcome: Progressing  Goal: Collaborate with me, my family, and caregiver to identify my specific goals  Outcome: Progressing     Problem: Discharge Barriers  Goal: My discharge needs are met  Outcome: Progressing     Problem: Skin  Goal: Participates in plan/prevention/treatment measures  Outcome: Progressing  Goal: Prevent/manage excess moisture  Outcome: Progressing  Goal: Prevent/minimize sheer/friction injuries  Outcome: Progressing  Goal: Promote/optimize nutrition  Outcome: Progressing  Goal: Promote skin healing  Outcome: Progressing     Problem: Respiratory  Goal: Clear secretions with interventions this shift  Outcome: Progressing  Goal: Minimize anxiety/maximize coping throughout shift  Outcome: Progressing  Goal: Minimal/no exertional discomfort or dyspnea this shift  Outcome: Progressing  Goal: No signs of respiratory distress (eg. Use of accessory muscles. Peds grunting)  Outcome: Progressing  Goal: Patent airway maintained this shift  Outcome: Progressing  Goal: Tolerate mechanical ventilation evidenced by VS/agitation level this shift  Outcome: Progressing  Goal: Tolerate pulmonary toileting this shift  Outcome: Progressing  Goal: Verbalize decreased shortness of breath this shift  Outcome: Progressing  Goal: Wean oxygen to maintain O2 saturation per order/standard this shift  Outcome: Progressing  Goal: Increase self care and/or family involvement in next 24  hours  Outcome: Progressing

## 2024-06-07 NOTE — SIGNIFICANT EVENT
Called by primary team to assess for transfer to MICU for q1h blood sugars.    Patient was noted to be more altered, had a glucose of 47 on BMP (61 on finger stick at 9am).  Was given an amp of D50 with improvement to , then noted to drop to 81.  Per primary team she has remained encephalopathic during this time.  Also put on BiPAP after ABG on 3L showed pH 7.36/pco2 46, pao2 91    On my assessment, she is waking appropriately on Bipap, most recent blood sugar is 173 not on continuous dextrose infusion.  Has diffuse anasarca, scattered inspiratory rhonchi.    Recommended removal of BiPAP as she does not have an indication at this time, start dextrose containing fluids, diurese if needed while giving an IV infusion.  Would stop sedating medications (patient was restarted on clozpine) in the setting of AMS.  Currently no need for ICU level care.    Discussed with Dr. Martinez.  Please recall code white team with clinical worsening.

## 2024-06-07 NOTE — CONSULTS
"Inpatient consult to Endocrinology  Consult performed by: Whit Neal MD  Consult ordered by: Anastasiya Key MD          Reason For Consult  Persistent hypoglycemia    History Of Present Illness  Lea Paulino is a 69 y.o. 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     Per chart review:  Patient was at [facility] on 6/1/2024 with complaints of of epigastric pain, dysuria and fatigue. She was found to have leukocytosis of 15.2, creatinine of 2.7, Santi 15.6, UA  +, CT abdomen and pelvis revealed \"gas in the RLQ transplant kidney and evidence of pancreatitis\". Patient was started on IV broad spectrum antibiotics for emphysematous pyelitis.  Patient was then transferred to [facility] given the history of renal transplant.      Course at [facility]:  On presentation patient was alert and oriented x 1, was hemodynamically stable.  WBC count was 20.  CXR was done which showed right-sided pleural effusion with associated atelectasis/consolidation. Patient was continued on IV vancomycin and IV meropenem to 8 hours for emphysematous cystitis.  BG 37 and acute hypercapnic respiratory failure requiring 4L -> requiring bipap and CXR with concern for fluid overload and patient has since received 40 mg IVP lasix with response , transferred to the MICU AHRF s/t pulmonary edema and intubated 6/4-6/5 for airway protection. During her admission, Calcium dropped to normal levels. PTH went up from 53 on 6/3 to 204 on 6/6.    Patient now with recurrent symptomatic " "hypoglycemia -> code white called on her in am -> received 2 amp of D50.  Endocrinology consulted for evaluation of hypoglycemia and workup for insulinoma / MEN1.    Workup:  ----------  -TSH 0.45 on Lt4 50 mcg  -AM cortisol 29.2 (H)  -C-peptide elevated at 5.2 (was not checked during hypoglycemic event, proinsulin pending   -HIV 1/2 Ag Ab: negative     Will be started On D5 NSS 50ml/h.      Past Medical History  She has a past medical history of Asymptomatic human immunodeficiency virus (hiv) infection status (Multi) (04/12/2016), Kidney transplant status ([facility] Personal history of other infectious and parasitic diseases (04/12/2016), Personal history of other infectious and parasitic diseases (04/12/2016), Postprocedural hypoparathyroidism (Multi), Postprocedural hypothyroidism, and Urinary tract infection, site not specified (04/08/2016).    Surgical History  She has a past surgical history that includes Other surgical history (05/05/2016).     Social History  She has no history on file for tobacco use, alcohol use, and drug use.    Family History  No family history on file.     Allergies  Macrodantin [nitrofurantoin macrocrystal] and Sulfa (sulfonamide antibiotics)    Review of Systems  Negative except for what is mentioned above.    Physical Exam  Patient somnolent, unable to provide hx, BG checked at the time of encounter was 108 while patient off D5  On HFNC  Not tachycardic ,not tachypneic  Abdomen non tender  Skin: edematous, bruising / hematomas  ROS, PMH, FH/SH, surgical history and allergies have been reviewed.    Last Recorded Vitals  Blood pressure 101/61, pulse 77, temperature 36.3 °C (97.3 °F), resp. rate 24, height 1.575 m (5' 2.01\"), weight 97.8 kg (215 lb 9.8 oz), SpO2 98%.    Relevant Results  Results from last 7 days   Lab Units 06/07/24  1124 06/07/24  1100 06/07/24  0956 06/07/24  0909 06/07/24  0635 06/06/24  2226 06/06/24  0355 06/06/24  0219 06/05/24  0342 06/05/24  0325 06/04/24  1232 " 06/04/24  1230 06/04/24  0351 06/04/24  0350   POCT GLUCOSE mg/dL 173* 81 127* 61*  --  97   < >  --    < >  --    < >  --    < >  --    GLUCOSE mg/dL  --   --   --   --  47*  --   --  67*  --  67*  --  89  --  95    < > = values in this interval not displayed.     Scheduled medications  carvedilol, 6.25 mg, oral, BID  cycloSPORINE, 100 mg, oral, q12h MARTIN  desvenlafaxine succinate, 100 mg, oral, Daily  levothyroxine, 50 mcg, oral, q AM  lidocaine, 5 mL, infiltration, Once  meropenem, 1 g, intravenous, q12h  micafungin, 100 mg, intravenous, q24h  mycophenolate, 500 mg, oral, BID  polyethylene glycol, 17 g, oral, Daily  predniSONE, 5 mg, oral, Daily  sennosides-docusate sodium, 2 tablet, oral, BID  simvastatin, 40 mg, oral, Nightly      Continuous medications  D5 % and 0.9 % sodium chloride, 50 mL/hr  heparin, 0-4,500 Units/hr, Last Rate: 1,100 Units/hr (06/07/24 0626)      PRN medications  PRN medications: alteplase, dextrose, dextrose, glucagon, glucagon, vancomycin       Results for orders placed or performed during the hospital encounter of 06/03/24 (from the past 24 hour(s))   Heparin Assay, UFH   Result Value Ref Range    Heparin Unfractionated 0.9 See Comment Below for Therapeutic Ranges IU/mL   POCT GLUCOSE   Result Value Ref Range    POCT Glucose 130 (H) 74 - 99 mg/dL   Heparin Assay, UFH   Result Value Ref Range    Heparin Unfractionated 1.0 See Comment Below for Therapeutic Ranges IU/mL   POCT GLUCOSE   Result Value Ref Range    POCT Glucose 97 74 - 99 mg/dL   Heparin Assay, UFH   Result Value Ref Range    Heparin Unfractionated 0.9 See Comment Below for Therapeutic Ranges IU/mL   CBC and Auto Differential   Result Value Ref Range    WBC 13.8 (H) 4.4 - 11.3 x10*3/uL    nRBC 0.0 0.0 - 0.0 /100 WBCs    RBC 3.26 (L) 4.00 - 5.20 x10*6/uL    Hemoglobin 11.3 (L) 12.0 - 16.0 g/dL    Hematocrit 37.8 36.0 - 46.0 %     (H) 80 - 100 fL    MCH 34.7 (H) 26.0 - 34.0 pg    MCHC 29.9 (L) 32.0 - 36.0 g/dL    RDW 17.1  (H) 11.5 - 14.5 %    Platelets 115 (L) 150 - 450 x10*3/uL    Neutrophils % 85.8 40.0 - 80.0 %    Immature Granulocytes %, Automated 1.3 (H) 0.0 - 0.9 %    Lymphocytes % 3.4 13.0 - 44.0 %    Monocytes % 7.7 2.0 - 10.0 %    Eosinophils % 1.7 0.0 - 6.0 %    Basophils % 0.1 0.0 - 2.0 %    Neutrophils Absolute 11.86 (H) 1.20 - 7.70 x10*3/uL    Immature Granulocytes Absolute, Automated 0.18 0.00 - 0.70 x10*3/uL    Lymphocytes Absolute 0.47 (L) 1.20 - 4.80 x10*3/uL    Monocytes Absolute 1.06 (H) 0.10 - 1.00 x10*3/uL    Eosinophils Absolute 0.23 0.00 - 0.70 x10*3/uL    Basophils Absolute 0.02 0.00 - 0.10 x10*3/uL   Comprehensive metabolic panel   Result Value Ref Range    Glucose 47 (LL) 74 - 99 mg/dL    Sodium 144 136 - 145 mmol/L    Potassium 4.1 3.5 - 5.3 mmol/L    Chloride 109 (H) 98 - 107 mmol/L    Bicarbonate 25 21 - 32 mmol/L    Anion Gap 14 10 - 20 mmol/L    Urea Nitrogen 36 (H) 6 - 23 mg/dL    Creatinine 1.79 (H) 0.50 - 1.05 mg/dL    eGFR 31 (L) >60 mL/min/1.73m*2    Calcium 8.3 (L) 8.6 - 10.6 mg/dL    Albumin 2.6 (L) 3.4 - 5.0 g/dL    Alkaline Phosphatase 149 (H) 33 - 136 U/L    Total Protein 4.9 (L) 6.4 - 8.2 g/dL     (H) 9 - 39 U/L    Bilirubin, Total 0.9 0.0 - 1.2 mg/dL    ALT 59 (H) 7 - 45 U/L   Phosphorus   Result Value Ref Range    Phosphorus 2.6 2.5 - 4.9 mg/dL   Magnesium   Result Value Ref Range    Magnesium 2.09 1.60 - 2.40 mg/dL   Heparin Assay, UFH   Result Value Ref Range    Heparin Unfractionated 0.6 See Comment Below for Therapeutic Ranges IU/mL   POCT GLUCOSE   Result Value Ref Range    POCT Glucose 61 (L) 74 - 99 mg/dL   POCT GLUCOSE   Result Value Ref Range    POCT Glucose 127 (H) 74 - 99 mg/dL   POCT GLUCOSE   Result Value Ref Range    POCT Glucose 81 74 - 99 mg/dL   Blood Gas Arterial Full Panel   Result Value Ref Range    POCT pH, Arterial 7.36 (L) 7.38 - 7.42 pH    POCT pCO2, Arterial 46 (H) 38 - 42 mm Hg    POCT pO2, Arterial 91 85 - 95 mm Hg    POCT SO2, Arterial 99 94 - 100 %     POCT Oxy Hemoglobin, Arterial 96.1 94.0 - 98.0 %    POCT Hematocrit Calculated, Arterial 33.0 (L) 36.0 - 46.0 %    POCT Sodium, Arterial 133 (L) 136 - 145 mmol/L    POCT Potassium, Arterial 3.9 3.5 - 5.3 mmol/L    POCT Chloride, Arterial 107 98 - 107 mmol/L    POCT Ionized Calcium, Arterial 1.15 1.10 - 1.33 mmol/L    POCT Glucose, Arterial 562 (HH) 74 - 99 mg/dL    POCT Lactate, Arterial 0.4 0.4 - 2.0 mmol/L    POCT Base Excess, Arterial 0.2 -2.0 - 3.0 mmol/L    POCT HCO3 Calculated, Arterial 26.0 22.0 - 26.0 mmol/L    POCT Hemoglobin, Arterial 10.9 (L) 12.0 - 16.0 g/dL    POCT Anion Gap, Arterial 4 (L) 10 - 25 mmo/L    Patient Temperature 37.0 degrees Celsius    FiO2 32 %   POCT GLUCOSE   Result Value Ref Range    POCT Glucose 173 (H) 74 - 99 mg/dL          Imaging:  ======     CT A/P: 6/1/2024:  1. Fullness and inflammation of the pancreatic head and body most suggestive   of pancreatitis but suboptimally evaluated without IV contrast. Recommend   clinical and laboratory correlation for pancreatitis and consider follow-up   with contrast enhanced imaging to exclude underlying mass.   2. Large colonic stool burden, which may indicate constipation.   3. Right lower quadrant transplant kidney. Tiny stone in the transplant   kidney. Some gas in the right lower quadrant transplant kidney collecting   system and the urinary bladder, which could be related to recent   instrumentation or infection with gas-forming organism.      Assessment/Plan   Principal Problem:    Pyelonephritis    Lea Paulino is a 69 y.o. 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 Patient extubated on .     Endocrinology consulted for evaluation of hypoglycemia and workup for insulinoma / MEN1.    -TSH 0.45 on Lt4 50 mcg  -AM cortisol 29.2 (H)  -HIV 1/2 Ag Ab: negative  -C-peptide elevated at 5.2 (was not checked during hypoglycemic event, also  excretion iso LAURA), proinsulin pending   -total protein 4.5 L, alb 2.6 L    -until oral feeding is appropriate, we recommend enteral feeding with TF vs escalating to D10 if persistently hypoglycemic on D5  -patient too unstable to undergo 72h testing for insulinoma for now  -in case BG drop < 55 -> stat: insulin, c-peptide, proinsulin, beta hydroxybutyrate, vbgs PRIOR to any correction with additional Dextrose amp  -hypoglycemia protocol  -very low suspicion for insulinoma or MEN1   -endocrinology will continue to follow    Plan communicated to primary team over the phone.  Patient seen, examined and case discussed with Dr Smith.      Whit Neal MD

## 2024-06-08 ENCOUNTER — APPOINTMENT (OUTPATIENT)
Dept: RADIOLOGY | Facility: HOSPITAL | Age: 69
End: 2024-06-08
Payer: MEDICARE

## 2024-06-08 LAB
ALBUMIN SERPL BCP-MCNC: 2.4 G/DL (ref 3.4–5)
ALBUMIN SERPL BCP-MCNC: 2.4 G/DL (ref 3.4–5)
ALP SERPL-CCNC: 158 U/L (ref 33–136)
ALT SERPL W P-5'-P-CCNC: 51 U/L (ref 7–45)
AMYLASE SERPL-CCNC: 528 U/L (ref 29–103)
ANION GAP SERPL CALC-SCNC: 11 MMOL/L (ref 10–20)
ANION GAP SERPL CALC-SCNC: 12 MMOL/L (ref 10–20)
AST SERPL W P-5'-P-CCNC: 88 U/L (ref 9–39)
BACTERIA BLD CULT: NORMAL
BACTERIA BLD CULT: NORMAL
BASOPHILS # BLD AUTO: 0.02 X10*3/UL (ref 0–0.1)
BASOPHILS NFR BLD AUTO: 0.2 %
BILIRUB SERPL-MCNC: 0.8 MG/DL (ref 0–1.2)
BUN SERPL-MCNC: 33 MG/DL (ref 6–23)
BUN SERPL-MCNC: 38 MG/DL (ref 6–23)
CA-I BLD-SCNC: 1.12 MMOL/L (ref 1.1–1.33)
CALCIUM SERPL-MCNC: 7.5 MG/DL (ref 8.6–10.6)
CALCIUM SERPL-MCNC: 7.9 MG/DL (ref 8.6–10.6)
CHLORIDE SERPL-SCNC: 108 MMOL/L (ref 98–107)
CHLORIDE SERPL-SCNC: 108 MMOL/L (ref 98–107)
CO2 SERPL-SCNC: 30 MMOL/L (ref 21–32)
CO2 SERPL-SCNC: 30 MMOL/L (ref 21–32)
CREAT SERPL-MCNC: 1.6 MG/DL (ref 0.5–1.05)
CREAT SERPL-MCNC: 1.72 MG/DL (ref 0.5–1.05)
EGFRCR SERPLBLD CKD-EPI 2021: 32 ML/MIN/1.73M*2
EGFRCR SERPLBLD CKD-EPI 2021: 35 ML/MIN/1.73M*2
EOSINOPHIL # BLD AUTO: 0.21 X10*3/UL (ref 0–0.7)
EOSINOPHIL NFR BLD AUTO: 1.7 %
ERYTHROCYTE [DISTWIDTH] IN BLOOD BY AUTOMATED COUNT: 16.9 % (ref 11.5–14.5)
GLUCOSE BLD MANUAL STRIP-MCNC: 102 MG/DL (ref 74–99)
GLUCOSE BLD MANUAL STRIP-MCNC: 137 MG/DL (ref 74–99)
GLUCOSE BLD MANUAL STRIP-MCNC: 66 MG/DL (ref 74–99)
GLUCOSE BLD MANUAL STRIP-MCNC: 70 MG/DL (ref 74–99)
GLUCOSE BLD MANUAL STRIP-MCNC: 80 MG/DL (ref 74–99)
GLUCOSE BLD MANUAL STRIP-MCNC: 83 MG/DL (ref 74–99)
GLUCOSE BLD MANUAL STRIP-MCNC: 89 MG/DL (ref 74–99)
GLUCOSE BLD MANUAL STRIP-MCNC: 92 MG/DL (ref 74–99)
GLUCOSE SERPL-MCNC: 105 MG/DL (ref 74–99)
GLUCOSE SERPL-MCNC: 126 MG/DL (ref 74–99)
HCT VFR BLD AUTO: 35.1 % (ref 36–46)
HGB BLD-MCNC: 10.5 G/DL (ref 12–16)
IMM GRANULOCYTES # BLD AUTO: 0.13 X10*3/UL (ref 0–0.7)
IMM GRANULOCYTES NFR BLD AUTO: 1.1 % (ref 0–0.9)
LIPASE SERPL-CCNC: 912 U/L (ref 9–82)
LYMPHOCYTES # BLD AUTO: 0.41 X10*3/UL (ref 1.2–4.8)
LYMPHOCYTES NFR BLD AUTO: 3.3 %
MAGNESIUM SERPL-MCNC: 1.85 MG/DL (ref 1.6–2.4)
MAGNESIUM SERPL-MCNC: 1.88 MG/DL (ref 1.6–2.4)
MCH RBC QN AUTO: 34.7 PG (ref 26–34)
MCHC RBC AUTO-ENTMCNC: 29.9 G/DL (ref 32–36)
MCV RBC AUTO: 116 FL (ref 80–100)
MICROORGANISM SPEC CULT: ABNORMAL
MONOCYTES # BLD AUTO: 0.8 X10*3/UL (ref 0.1–1)
MONOCYTES NFR BLD AUTO: 6.5 %
NEUTROPHILS # BLD AUTO: 10.81 X10*3/UL (ref 1.2–7.7)
NEUTROPHILS NFR BLD AUTO: 87.2 %
NRBC BLD-RTO: 0 /100 WBCS (ref 0–0)
PHOSPHATE SERPL-MCNC: 2.3 MG/DL (ref 2.5–4.9)
PHOSPHATE SERPL-MCNC: 2.6 MG/DL (ref 2.5–4.9)
PLATELET # BLD AUTO: 119 X10*3/UL (ref 150–450)
POTASSIUM SERPL-SCNC: 3.8 MMOL/L (ref 3.5–5.3)
POTASSIUM SERPL-SCNC: 4.2 MMOL/L (ref 3.5–5.3)
PROT SERPL-MCNC: 4.7 G/DL (ref 6.4–8.2)
RBC # BLD AUTO: 3.03 X10*6/UL (ref 4–5.2)
SERVICE CMNT-IMP: ABNORMAL
SODIUM SERPL-SCNC: 145 MMOL/L (ref 136–145)
SODIUM SERPL-SCNC: 146 MMOL/L (ref 136–145)
SPECIMEN DESCRIPTION: ABNORMAL
UFH PPP CHRO-ACNC: 0.5 IU/ML
VANCOMYCIN SERPL-MCNC: 20.1 UG/ML (ref 5–20)
WBC # BLD AUTO: 12.4 X10*3/UL (ref 4.4–11.3)

## 2024-06-08 PROCEDURE — 2500000005 HC RX 250 GENERAL PHARMACY W/O HCPCS

## 2024-06-08 PROCEDURE — 82947 ASSAY GLUCOSE BLOOD QUANT: CPT | Mod: 91

## 2024-06-08 PROCEDURE — 2500000004 HC RX 250 GENERAL PHARMACY W/ HCPCS (ALT 636 FOR OP/ED)

## 2024-06-08 PROCEDURE — 99233 SBSQ HOSP IP/OBS HIGH 50: CPT

## 2024-06-08 PROCEDURE — 80053 COMPREHEN METABOLIC PANEL: CPT

## 2024-06-08 PROCEDURE — 85520 HEPARIN ASSAY: CPT

## 2024-06-08 PROCEDURE — 84100 ASSAY OF PHOSPHORUS: CPT | Mod: 91

## 2024-06-08 PROCEDURE — 2500000001 HC RX 250 WO HCPCS SELF ADMINISTERED DRUGS (ALT 637 FOR MEDICARE OP)

## 2024-06-08 PROCEDURE — 84100 ASSAY OF PHOSPHORUS: CPT

## 2024-06-08 PROCEDURE — 99233 SBSQ HOSP IP/OBS HIGH 50: CPT | Performed by: INTERNAL MEDICINE

## 2024-06-08 PROCEDURE — 83735 ASSAY OF MAGNESIUM: CPT

## 2024-06-08 PROCEDURE — 85025 COMPLETE CBC W/AUTO DIFF WBC: CPT

## 2024-06-08 PROCEDURE — 82330 ASSAY OF CALCIUM: CPT

## 2024-06-08 PROCEDURE — 71045 X-RAY EXAM CHEST 1 VIEW: CPT | Performed by: RADIOLOGY

## 2024-06-08 PROCEDURE — 92610 EVALUATE SWALLOWING FUNCTION: CPT | Mod: GN

## 2024-06-08 PROCEDURE — 99232 SBSQ HOSP IP/OBS MODERATE 35: CPT | Performed by: INTERNAL MEDICINE

## 2024-06-08 PROCEDURE — 71045 X-RAY EXAM CHEST 1 VIEW: CPT

## 2024-06-08 PROCEDURE — 83735 ASSAY OF MAGNESIUM: CPT | Mod: 91

## 2024-06-08 PROCEDURE — 83690 ASSAY OF LIPASE: CPT | Performed by: INTERNAL MEDICINE

## 2024-06-08 PROCEDURE — 82150 ASSAY OF AMYLASE: CPT | Performed by: INTERNAL MEDICINE

## 2024-06-08 PROCEDURE — 1200000002 HC GENERAL ROOM WITH TELEMETRY DAILY

## 2024-06-08 PROCEDURE — 80202 ASSAY OF VANCOMYCIN: CPT

## 2024-06-08 RX ORDER — VANCOMYCIN HYDROCHLORIDE 500 MG/100ML
500 INJECTION, SOLUTION INTRAVENOUS ONCE
Status: DISCONTINUED | OUTPATIENT
Start: 2024-06-08 | End: 2024-06-08

## 2024-06-08 RX ORDER — OXYMETAZOLINE HCL 0.05 %
SPRAY, NON-AEROSOL (ML) NASAL
Status: COMPLETED
Start: 2024-06-08 | End: 2024-06-08

## 2024-06-08 RX ORDER — DEXTROSE MONOHYDRATE AND SODIUM CHLORIDE 5; .45 G/100ML; G/100ML
50 INJECTION, SOLUTION INTRAVENOUS CONTINUOUS
Status: DISCONTINUED | OUTPATIENT
Start: 2024-06-08 | End: 2024-06-10

## 2024-06-08 RX ORDER — FUROSEMIDE 40 MG/1
40 TABLET ORAL ONCE
Status: DISCONTINUED | OUTPATIENT
Start: 2024-06-08 | End: 2024-06-09

## 2024-06-08 RX ORDER — MAGNESIUM SULFATE HEPTAHYDRATE 40 MG/ML
2 INJECTION, SOLUTION INTRAVENOUS ONCE
Status: COMPLETED | OUTPATIENT
Start: 2024-06-08 | End: 2024-06-08

## 2024-06-08 RX ORDER — PREDNISONE 5 MG/1
5 TABLET ORAL DAILY
Status: DISCONTINUED | OUTPATIENT
Start: 2024-06-08 | End: 2024-06-08

## 2024-06-08 RX ADMIN — MEROPENEM 1 G: 1 INJECTION, POWDER, FOR SOLUTION INTRAVENOUS at 11:24

## 2024-06-08 RX ADMIN — MEROPENEM 1 G: 1 INJECTION, POWDER, FOR SOLUTION INTRAVENOUS at 23:30

## 2024-06-08 RX ADMIN — DEXTROSE MONOHYDRATE 12.5 G: 25 INJECTION, SOLUTION INTRAVENOUS at 04:38

## 2024-06-08 RX ADMIN — DEXTROSE MONOHYDRATE 12.5 G: 25 INJECTION, SOLUTION INTRAVENOUS at 21:29

## 2024-06-08 RX ADMIN — DEXTROSE AND SODIUM CHLORIDE 50 ML/HR: 5; 900 INJECTION, SOLUTION INTRAVENOUS at 09:24

## 2024-06-08 RX ADMIN — Medication 2 L/MIN: at 23:35

## 2024-06-08 RX ADMIN — VANCOMYCIN HYDROCHLORIDE 500 MG: 500 INJECTION, SOLUTION INTRAVENOUS at 14:48

## 2024-06-08 RX ADMIN — DEXTROSE AND SODIUM CHLORIDE 50 ML/HR: 5; 450 INJECTION, SOLUTION INTRAVENOUS at 17:04

## 2024-06-08 RX ADMIN — MAGNESIUM SULFATE HEPTAHYDRATE 2 G: 40 INJECTION, SOLUTION INTRAVENOUS at 21:09

## 2024-06-08 RX ADMIN — MICAFUNGIN SODIUM 100 MG: 100 INJECTION, POWDER, LYOPHILIZED, FOR SOLUTION INTRAVENOUS at 11:25

## 2024-06-08 RX ADMIN — HEPARIN SODIUM 1100 UNITS/HR: 10000 INJECTION, SOLUTION INTRAVENOUS at 09:24

## 2024-06-08 RX ADMIN — NASAL DECONGESTANT: 0.05 SPRAY NASAL at 23:48

## 2024-06-08 ASSESSMENT — COGNITIVE AND FUNCTIONAL STATUS - GENERAL
DRESSING REGULAR LOWER BODY CLOTHING: TOTAL
WALKING IN HOSPITAL ROOM: TOTAL
STANDING UP FROM CHAIR USING ARMS: TOTAL
CLIMB 3 TO 5 STEPS WITH RAILING: TOTAL
TURNING FROM BACK TO SIDE WHILE IN FLAT BAD: A LOT
DRESSING REGULAR UPPER BODY CLOTHING: A LOT
TOILETING: TOTAL
MOVING FROM LYING ON BACK TO SITTING ON SIDE OF FLAT BED WITH BEDRAILS: A LOT
DAILY ACTIVITIY SCORE: 10
MOBILITY SCORE: 8
PERSONAL GROOMING: A LOT
HELP NEEDED FOR BATHING: A LOT
EATING MEALS: A LOT
MOVING TO AND FROM BED TO CHAIR: TOTAL

## 2024-06-08 ASSESSMENT — PAIN SCALES - GENERAL
PAINLEVEL_OUTOF10: 0 - NO PAIN
PAINLEVEL_OUTOF10: 0 - NO PAIN

## 2024-06-08 ASSESSMENT — PAIN - FUNCTIONAL ASSESSMENT
PAIN_FUNCTIONAL_ASSESSMENT: 0-10
PAIN_FUNCTIONAL_ASSESSMENT: 0-10

## 2024-06-08 NOTE — PROGRESS NOTES
"        INPATIENT TRANSPLANT NEPHROLOGY PROGRESS NOTE          REASON FOR CONSULT:  Immunosuppressive medication management and nephrology related issues.    SUBJECTION:     Overnight MICU was called to evaluate the patient for AMS in the setting of low glucose, BiPAP.      PHYCISCAL EXAMINATION:    Visit Vitals  /62   Pulse 77   Temp 36.6 °C (97.9 °F)   Resp 16   Ht 1.575 m (5' 2.01\")   Wt 97.8 kg (215 lb 9.8 oz)   SpO2 98%   BMI 39.43 kg/m²   Smoking Status Unknown   BSA 2.07 m²        06/06 1900 - 06/08 0659  In: 550 [I.V.:250]  Out: 2000 [Urine:2000]     Weight change:     General Appearance - NAD, Good speech, oriented and alert  HEENT - Supple. Not pale. No jaundice. No cervical lymphadenopathy. Pharynx and tonsils are not injected.  CVS - RRR. Normal S1/S2. No murmur, click , rub or gallop  Lungs- clear to auscultation bilaterally  Abdomen - soft , not tender, no guarding, no rigidity. No hepatosplenomegaly. Normal bowel sounds. No masses and ascites. S/P Kidney transplant .  Transplanted kidney is not tender.   Musculoskeletal /Extremities - no edema. Full ROM. No joint tenderness.   Neuro/Psych - appropriate mood and affect. Motor power V/V all extremities. CN I -XII were grossly intact.  Skin - No visible rash    MEDICATION LIST: REVIEWED    [Held by provider] carvedilol, 6.25 mg, BID  [Held by provider] cycloSPORINE, 100 mg, q12h MARTIN  [Held by provider] desvenlafaxine succinate, 100 mg, Daily  [Held by provider] levothyroxine, 50 mcg, q AM  lidocaine, 5 mL, Once  meropenem, 1 g, q12h  micafungin, 100 mg, q24h  [Held by provider] mycophenolate, 500 mg, BID  oxygen, , Continuous - Inhalation  [Held by provider] polyethylene glycol, 17 g, Daily  [Held by provider] predniSONE, 5 mg, Daily  [Held by provider] sennosides-docusate sodium, 2 tablet, BID  [Held by provider] simvastatin, 40 mg, Nightly  vancomycin, 500 mg, Once      D5 % and 0.9 % sodium chloride, Last Rate: 50 mL/hr (06/08/24 0924)  heparin, " Last Rate: 1,100 Units/hr (06/08/24 0945)      alteplase, 2 mg, PRN  dextrose, 12.5 g, q15 min PRN  dextrose, 25 g, q15 min PRN  glucagon, 1 mg, q15 min PRN  glucagon, 1 mg, q15 min PRN  vancomycin, , Daily PRN        ALLERGY:  Allergies   Allergen Reactions    Macrodantin [Nitrofurantoin Macrocrystal] GI Upset    Sulfa (Sulfonamide Antibiotics) Rash       LABS:  Results for orders placed or performed during the hospital encounter of 06/03/24 (from the past 24 hour(s))   Blood Gas Arterial Full Panel   Result Value Ref Range    POCT pH, Arterial 7.30 (L) 7.38 - 7.42 pH    POCT pCO2, Arterial 56 (H) 38 - 42 mm Hg    POCT pO2, Arterial 68 (L) 85 - 95 mm Hg    POCT SO2, Arterial 95 94 - 100 %    POCT Oxy Hemoglobin, Arterial 92.4 (L) 94.0 - 98.0 %    POCT Hematocrit Calculated, Arterial 34.0 (L) 36.0 - 46.0 %    POCT Sodium, Arterial 136 136 - 145 mmol/L    POCT Potassium, Arterial 4.0 3.5 - 5.3 mmol/L    POCT Chloride, Arterial 108 (H) 98 - 107 mmol/L    POCT Ionized Calcium, Arterial 1.09 (L) 1.10 - 1.33 mmol/L    POCT Glucose, Arterial 116 (H) 74 - 99 mg/dL    POCT Lactate, Arterial 0.6 0.4 - 2.0 mmol/L    POCT Base Excess, Arterial 0.3 -2.0 - 3.0 mmol/L    POCT HCO3 Calculated, Arterial 27.6 (H) 22.0 - 26.0 mmol/L    POCT Hemoglobin, Arterial 11.3 (L) 12.0 - 16.0 g/dL    POCT Anion Gap, Arterial 4 (L) 10 - 25 mmo/L    Patient Temperature 37.0 degrees Celsius    FiO2 40 %   POCT GLUCOSE   Result Value Ref Range    POCT Glucose 101 (H) 74 - 99 mg/dL   POCT GLUCOSE   Result Value Ref Range    POCT Glucose 104 (H) 74 - 99 mg/dL   POCT GLUCOSE   Result Value Ref Range    POCT Glucose 100 (H) 74 - 99 mg/dL   POCT GLUCOSE   Result Value Ref Range    POCT Glucose 66 (L) 74 - 99 mg/dL   POCT GLUCOSE   Result Value Ref Range    POCT Glucose 137 (H) 74 - 99 mg/dL   CBC and Auto Differential   Result Value Ref Range    WBC 12.4 (H) 4.4 - 11.3 x10*3/uL    nRBC 0.0 0.0 - 0.0 /100 WBCs    RBC 3.03 (L) 4.00 - 5.20 x10*6/uL     Hemoglobin 10.5 (L) 12.0 - 16.0 g/dL    Hematocrit 35.1 (L) 36.0 - 46.0 %     (H) 80 - 100 fL    MCH 34.7 (H) 26.0 - 34.0 pg    MCHC 29.9 (L) 32.0 - 36.0 g/dL    RDW 16.9 (H) 11.5 - 14.5 %    Platelets 119 (L) 150 - 450 x10*3/uL    Neutrophils % 87.2 40.0 - 80.0 %    Immature Granulocytes %, Automated 1.1 (H) 0.0 - 0.9 %    Lymphocytes % 3.3 13.0 - 44.0 %    Monocytes % 6.5 2.0 - 10.0 %    Eosinophils % 1.7 0.0 - 6.0 %    Basophils % 0.2 0.0 - 2.0 %    Neutrophils Absolute 10.81 (H) 1.20 - 7.70 x10*3/uL    Immature Granulocytes Absolute, Automated 0.13 0.00 - 0.70 x10*3/uL    Lymphocytes Absolute 0.41 (L) 1.20 - 4.80 x10*3/uL    Monocytes Absolute 0.80 0.10 - 1.00 x10*3/uL    Eosinophils Absolute 0.21 0.00 - 0.70 x10*3/uL    Basophils Absolute 0.02 0.00 - 0.10 x10*3/uL   Comprehensive metabolic panel   Result Value Ref Range    Glucose 105 (H) 74 - 99 mg/dL    Sodium 146 (H) 136 - 145 mmol/L    Potassium 3.8 3.5 - 5.3 mmol/L    Chloride 108 (H) 98 - 107 mmol/L    Bicarbonate 30 21 - 32 mmol/L    Anion Gap 12 10 - 20 mmol/L    Urea Nitrogen 38 (H) 6 - 23 mg/dL    Creatinine 1.72 (H) 0.50 - 1.05 mg/dL    eGFR 32 (L) >60 mL/min/1.73m*2    Calcium 7.9 (L) 8.6 - 10.6 mg/dL    Albumin 2.4 (L) 3.4 - 5.0 g/dL    Alkaline Phosphatase 158 (H) 33 - 136 U/L    Total Protein 4.7 (L) 6.4 - 8.2 g/dL    AST 88 (H) 9 - 39 U/L    Bilirubin, Total 0.8 0.0 - 1.2 mg/dL    ALT 51 (H) 7 - 45 U/L   Phosphorus   Result Value Ref Range    Phosphorus 2.3 (L) 2.5 - 4.9 mg/dL   Calcium, ionized   Result Value Ref Range    POCT Calcium, Ionized 1.12 1.1 - 1.33 mmol/L   Vancomycin   Result Value Ref Range    Vancomycin 20.1 (H) 5.0 - 20.0 ug/mL   Magnesium   Result Value Ref Range    Magnesium 1.85 1.60 - 2.40 mg/dL   Heparin Assay, UFH   Result Value Ref Range    Heparin Unfractionated 0.5 See Comment Below for Therapeutic Ranges IU/mL   POCT GLUCOSE   Result Value Ref Range    POCT Glucose 80 74 - 99 mg/dL   POCT GLUCOSE   Result Value  Ref Range    POCT Glucose 89 74 - 99 mg/dL        ASSESSMENT AND PLAN:    69 y.o. 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 Transferred to [facility] on 6/2. ID consulted for emphysematous pyelonephritis.     Transplant nephrology is consulted to assist with immunosuppressive medication management and nephrology related issues.        1. LAURA S/P Kidney transplant. Due to UTI, ATN, pancreatitis    - Renal allograft function:   Lab Results   Component Value Date    CREATININE 1.72 (H) 06/08/2024     Estimated Creatinine Clearance: 34.2 mL/min (A) (by C-G formula based on SCr of 1.72 mg/dL (H)).    Intake/Output Summary (Last 24 hours) at 6/8/2024 1417  Last data filed at 6/7/2024 2257  Gross per 24 hour   Intake 100 ml   Output 300 ml   Net -200 ml     -Echocardiogram with EF 40-45% and NORMAL RVSP.  - Consider repeating CXR to determine goal of I/O Diuresis and check for possible aspiration pneumonia due to AMS   -Team is deciding about albumin/lasix    - Continue to monitor UOP and Serum creatinine closely.   - Avoid nephrotoxic agents, NSAIDs and IV contrast   - Strict I/O.   - Renally dose all medications by the most recent CrCl from Cockcroft-Gault formula.    2. Immunosuppression   - continue current immunosuppression   - Monitor cyclosporine trough level before am dose is given. Target CSA trough level at 12 hour ~  - Continue cyclosporine at current dose. If patient doesn't pass swallow eval, can do cyclosporine solution via dubhoff (same dose)  - mg bid .If patient doesn't pass swallow eval, can do mycophenolate  suspension via dubhoff (same dose)      3. Anemia and WBC   Lab Results   Component Value Date    WBC 12.4 (H) 06/08/2024    HGB 10.5 (L) 06/08/2024    HCT 35.1 (L) 06/08/2024     (H) 06/08/2024     (L) 06/08/2024     -Continue to monitor Hgb   -No indications for PRBC transfusion     4. Electrolyte   Lab Results   Component Value Date    GLUCOSE 105 (H) 06/08/2024    CALCIUM 7.9 (L) 06/08/2024     (H) 06/08/2024    K 3.8 06/08/2024    CO2 30 06/08/2024     (H) 06/08/2024    BUN 38 (H) 06/08/2024    CREATININE 1.72 (H) 06/08/2024     - Reviewed renal profile.     6. Hypertension   Blood Pressures         6/7/2024  1919 6/7/2024  2313 6/8/2024  0423 6/8/2024  0919 6/8/2024  1110    BP: 132/68 140/68 125/70 120/59 135/62          -Goal BP < 140/90 mmHg   -continue current management     7. UTI  Defer to ID  On meropenem    8.  GI prophylaxis   - On PPI     9. DVT Prophylaxis  -Defer to primary team    10. Pancreatitis  -add on Amylase and Lipase    * Case was discussed with primary team.  For questions, please contact transplant nephrology page x [phone]    Deidra Robb    Transplant Nephrologist

## 2024-06-08 NOTE — PROGRESS NOTES
Vancomycin Dosing by Pharmacy- FOLLOW UP    Lea Paulino is a 69 y.o. year old female who Pharmacy has been consulted for vancomycin dosing for other uti . Based on the patient's indication and renal status this patient is being dosed based on a goal trough/random level of 10-15.     Renal function is currently declining.    Current vancomycin dose: 500mg x1    Most recent random level: 20.1 mcg/mL, this was only a 14hr level instead of 24hr so expect this to be elevated.     Visit Vitals  /59   Pulse 77   Temp 36.9 °C (98.4 °F)   Resp 20        Lab Results   Component Value Date    CREATININE 1.72 (H) 2024    CREATININE 1.79 (H) 2024    CREATININE 1.79 (H) 2024    CREATININE 1.68 (H) 2024    CREATININE 1.50 2023        Patient weight is as follows:   Vitals:    24 0400   Weight: 97.8 kg (215 lb 9.8 oz)       Cultures:  No results found for the encounter in last 14 days.       I/O last 3 completed shifts:  In: 550 (5.6 mL/kg) [I.V.:250 (2.6 mL/kg); IV Piggyback:300]  Out: 2000 (20.5 mL/kg) [Urine:2000 (0.6 mL/kg/hr)]  Weight: 97.8 kg   I/O during current shift:  No intake/output data recorded.    Temp (24hrs), Av.6 °C (97.9 °F), Min:36.2 °C (97.2 °F), Max:37 °C (98.6 °F)      Assessment/Plan    Anticipate true trough to be closer to 15, will re dose with 500mg at 1400 today and check a 24hr level.     This dosing regimen is predicted by [employer] to result in the following pharmacokinetic parameters    The next level will be obtained on  at 1400. May be obtained sooner if clinically indicated.   Will continue to monitor renal function daily while on vancomycin and order serum creatinine at least every 48 hours if not already ordered.  Follow for continued vancomycin needs, clinical response, and signs/symptoms of toxicity.       Iram Cole, PharmD

## 2024-06-08 NOTE — CARE PLAN
Problem: Pain  Goal: My pain/discomfort is manageable  Outcome: Progressing     Problem: Safety  Goal: Patient will be injury free during hospitalization  Outcome: Progressing  Goal: I will remain free of falls  Outcome: Progressing     Problem: Daily Care  Goal: Daily care needs are met  Outcome: Progressing     Problem: Psychosocial Needs  Goal: Demonstrates ability to cope with hospitalization/illness  Outcome: Progressing  Goal: Collaborate with me, my family, and caregiver to identify my specific goals  Outcome: Progressing     Problem: Discharge Barriers  Goal: My discharge needs are met  Outcome: Progressing     Problem: Skin  Goal: Participates in plan/prevention/treatment measures  Outcome: Progressing  Goal: Prevent/manage excess moisture  Outcome: Progressing  Goal: Prevent/minimize sheer/friction injuries  Outcome: Progressing  Goal: Promote/optimize nutrition  Outcome: Progressing  Goal: Promote skin healing  Outcome: Progressing  Flowsheets (Taken 6/8/2024 1811)  Promote skin healing:   Assess skin/pad under line(s)/device(s)   Turn/reposition every 2 hours/use positioning/transfer devices     Problem: Respiratory  Goal: Clear secretions with interventions this shift  Outcome: Progressing  Goal: Minimize anxiety/maximize coping throughout shift  Outcome: Progressing  Goal: Minimal/no exertional discomfort or dyspnea this shift  Outcome: Progressing  Goal: No signs of respiratory distress (eg. Use of accessory muscles. Peds grunting)  Outcome: Progressing  Goal: Patent airway maintained this shift  Outcome: Progressing  Goal: Tolerate mechanical ventilation evidenced by VS/agitation level this shift  Outcome: Progressing  Goal: Tolerate pulmonary toileting this shift  Outcome: Progressing  Goal: Verbalize decreased shortness of breath this shift  Outcome: Progressing  Goal: Wean oxygen to maintain O2 saturation per order/standard this shift  Outcome: Progressing  Goal: Increase self care and/or family  involvement in next 24 hours  Outcome: Progressing   The patient's goals for the shift include      The clinical goals for the shift include Patient will not be hypoglycemic during this shift    Patient alert and oriented x 3. Patient currently on 4.5L NC satting in the 90's. Antibiotics discontinued. Patient running D5%.45% NS @ 50ml/hr. BS stable. Patient repositioned throughout the shift. Dobhoff to be placed this evening. Safety maintained. Call light within reach.

## 2024-06-08 NOTE — PROGRESS NOTES
Speech-Language Pathology  Adult Inpatient Clinical Bedside Swallow Evaluation    Patient Name: Lea Paulino  MRN: [id number]  Today's Date: 6/8/2024   Start Time: 1445  Stop Time: 1505  Time Calculation (min): 20    History of Present Illness:   Lea Paulino is a 69 y.o. female with a history of COPD, renal transplant in 2015 on cyclosporine, mycophenolate and prednisone, HTN, HLD, hypothyroid, hyperparathyroid, HFmEF (45-50%), AFib, PVD, lymphedema, 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, presenting to the MICU in acute respiratory distress placed on BiPAP. Initially patient considered to an outside ED on June 1 which presented with epigastric pain and dysuria.  At the time patient seen to have emphysematous pyelonephritis and acute pancreatitis.  Her workup at the time showed an elevated creatinine of 2.71 baseline is about 1.5-1.7 as well as hypercalcemia of 15.4.  She had a CT abdomen pelvis which showed pancreatic inflammation as well as gas in the collecting system and urinary bladder which was concerning for infection with gas-forming organism.  Patient was made n.p.o. and started on IV fluids.  Patient was started on vancomycin and Zosyn and then transition to meropenem and gentamicin.  Patient had episodes of nonsustained V. tach for which cardiology was consulted and recommended increasing beta-blocker and repeating an echo.  Per nephrology patient was recommended IV resuscitation as well as current immunosuppressive regimen and hypercalcemia workup.  Due to her transplant history of renal transplant 2015 patient was transferred to [facility] for further care.  Patient was admitted to [facility] where she was seen to be altered and was found to have a glucose of 37 in which she was given 2 A of D50.  Patient began to be in acute respiratory distress with a VBG showing a pH of 7.23, CO2 66 and a lactate of 1.  Patient was placed on BiPAP and was admitted to the  "ICU.  X-ray chest done yesterday 6/3 showed right-sided pleural effusion with associated atelectasis/consolidation.     On arrival to the ICU patient was tolerating BiPAP.  Patient was arousable.  Repeat VBG 7.20/73. Due to expected clinical course patient was intubated. Suspicion for Meningitis in setting of AMS and recent epidural injection, CT head and spine obtained with possibility of LP. CT C/A/P ordered as well for suspicion of worsening infection or stool burden causing AMS/ respiratory distress.     Assessment:   Clinical bedside swallow evaluation completed. Pt cleared for evaluation by RN. Awake/alert and upright in bed for session; on 4L nasal cannula. A&Ox3; unable to identify month/year (responded, \"is it the 4th?\" to both questions). Oral mechanism examination unremarkable. Cooperative throughout eval though intermittently required repetition of commands + w/ tangential speech x3. Endorses prior h/o dysphagia but unable to elaborate when prompted. Of note, pt dyspneic w/ speech + w/ weak/breathy vocal quality throughout eval. Strong volitional cough prior to administration of PO trials.     Trials of ice chips x2, tsp sips water x2, single/consecutive sips water via straw, and 3 oz protocol via straw were given. Normal oral management of all trials. Overt clinical s/s aspiration following consecutive sips water via straw characterized by delayed cough/wet vocal quality. Unable to complete 3 oz protocol attempt 2/2 frequent belching. No further trials given 2/2 c/f aspiration.     Unable to determine swallow safety/efficiency at the bedside. Recommend MBSS to instrumentally evaluate swallow function/determine least restrictive diet; will complete as orders placed/radiology schedule permits. Recommend NPO w/ frequent aggressive oral care until MBSS can be completed. 3-5 ice chips or small sips water per hr allowed for pleasure, safe swallow stimulation, and after oral care to prevent buildup of bacteria " within the oropharynx. RN/MD aware.     Recommendations:  NPO with frequent aggressive oral care.  3-5 ice chips or small sips water per hr allowed for pleasure, safe swallow stimulation, and after oral care to prevent buildup of bacteria within the oropharynx  MBSS to instrumentally evaluate swallow function/determine least restrictive diet; will complete as orders placed/radiology schedule permits    Goal:   Pt will tolerate least restrictive diet with no overt clinical s/s aspiration 100% of the time.        Plan:  SLP Services Indicated: Yes  Frequency: 2x week  Discussed POC with patient  SLP - OK to Discharge    Pain:   0-10  0 = No pain.     Inpatient Education:  Extensive education provided to patient regarding current swallow function, recommendations/results, and POC.      Consultations/Referrals/Coordination of Services:   N/A

## 2024-06-08 NOTE — PROGRESS NOTES
Vancomycin Dosing by Pharmacy- Cessation of Therapy    Consult to pharmacy for vancomycin dosing has been discontinued by the prescriber, pharmacy will sign off at this time.    Please call pharmacy if there are further questions or re-enter a consult if vancomycin is resumed.     Shalonda Ignacio, FeliceD

## 2024-06-08 NOTE — PROGRESS NOTES
Lea Paulino is a 69 y.o. female on day 5 of admission presenting with Pyelonephritis.    Subjective   Interval History: Patient had rapid response called yesterday for dyspnea and was placed on BiPAP and high flow nasal cannula oxygen.  This morning patient is on nasal cannula oxygen and breathing comfortably.  Patient states she is doing better.      Objective   Range of Vitals (last 24 hours)  Heart Rate:  [72-77]   Temp:  [36.2 °C (97.2 °F)-36.9 °C (98.4 °F)]   Resp:  [16-20]   BP: (110-140)/(59-75)   SpO2:  [96 %-98 %]   Daily Weight  06/06/24 : 97.8 kg (215 lb 9.8 oz)    Body mass index is 39.43 kg/m².    Physical Exam  GENERAL APPEARANCE: Awake and alert and on nasal cannula oxygen  HEENT: Atraumatic and normocephalic  CARDIAC: Regular   LUNGS: Clear  ABDOMEN: Soft and nontender  EXTREMITIES: 2+ edema  SKIN: Ecchymosis and erythema noted over both arms from blood draws.  Right upper arm dual-lumen PICC line noted.      Antibiotics  dextrose injection  - Omnicell Override Pull  vancomycin (Vancocin) pharmacy to dose - pharmacy monitoring  vancomycin (Vancocin) in dextrose 5 % water (D5W) 500 mL IV 1,500 mg  meropenem (Merrem) in sodium chloride 0.9 % 100 mL IV 1 g  dextrose 50 % injection 25 g  meropenem (Merrem) in sodium chloride 0.9 % 100 mL IV 1 g  dextrose 5 % and lactated Ringer's bolus 1,000 mL  lactated Ringer's bolus 1,000 mL  sodium chloride 0.9% infusion  dextrose 50 % injection 25 g  magnesium sulfate IV 4 g  magnesium sulfate IV 2 g  D5 % and 0.9 % sodium chloride infusion  vancomycin (Vancocin) 1,000 mg in dextrose 5% water 200 mL  vancomycin (Vancocin) 1,000 mg in dextrose 5% water 200 mL  dextrose injection  - Omnicell Override Pull  dextrose 50 % injection 25 g  dextrose injection  - Omnicell Override Pull  predniSONE (Deltasone) tablet  folic acid (Folvite) tablet  metoprolol tartrate (Lopressor) tablet  vancomycin (Vancocin) capsule  allopurinol (Zyloprim) tablet  levothyroxine  (Synthroid, Levoxyl) tablet    apixaban (Eliquis) tablet  bumetanide (Bumex) tablet  desvenlafaxine (Pristiq) 24 hr tablet  docusate sodium (Colace) capsule  simvastatin (Zocor) tablet  calcitriol (Rocaltrol) capsule  cloZAPine (Clozaril) tablet    cholecalciferol (Vitamin D-3) tablet  cycloSPORINE (SandIMMUNE) capsule  mycophenolate (Cellcept) tablet  acyclovir (Zovirax) tablet    acyclovir (Zovirax) tablet 400 mg  apixaban (Eliquis) tablet 5 mg  bumetanide (Bumex) tablet 1 mg  allopurinol (Zyloprim) tablet 100 mg  calcitriol (Rocaltrol) capsule 0.5 mcg  cloZAPine (Clozaril) tablet 50 mg  cycloSPORINE (SandIMMUNE) capsule 100 mg  desvenlafaxine (Pristiq) 24 hr tablet 100 mg  levothyroxine (Synthroid, Levoxyl) tablet 50 mcg  predniSONE (Deltasone) tablet 5 mg  simvastatin (Zocor) tablet 40 mg  ipratropium-albuteroL (Duo-Neb) 0.5-2.5 mg/3 mL nebulizer solution 3 mL  polyethylene glycol (Glycolax, Miralax) packet 17 g  sennosides (Senokot) tablet 8.6 mg  mycophenolate (Cellcept) capsule 500 mg  heparin 25,000 Units in dextrose 5% 250 mL (100 Units/mL) infusion (premix)  heparin bolus from bag 3,000-6,000 Units  furosemide (Lasix) injection 40 mg  dextrose 50 % injection 25 g  glucagon (Glucagen) injection 1 mg  dextrose 50 % injection 25 g  glucagon (Glucagen) injection 1 mg  dextrose 50 % injection 12.5 g  potassium chloride 20 mEq in 100 mL IV premix  sennosides-docusate sodium (Padmaja-Colace) 8.6-50 mg per tablet 2 tablet  polyethylene glycol (Glycolax, Miralax) packet 17 g  heparin 25,000 Units in dextrose 5% 250 mL (100 Units/mL) infusion (premix)  perflutren lipid microspheres (Definity) injection 0.5-10 mL of dilution  sulfur hexafluoride microsphr (Lumason) injection 24.28 mg  perflutren protein A microsphere (Optison) injection 0.5 mL  midazolam (Versed) injection  - Omnicell Override Pull  fentaNYL PF (Sublimaze) injection  - Omnicell Override Pull  etomidate (Amidate) injection  - Omnicell Override  Pull  fentaNYL infusion  - Omnicell Override Pull  dexmedeTOMIDine (Precedex) sodium chloride 0.9% infusion  - Omnicell Override Pull  rocuronium (ZeMuron) injection  - Omnicell Override Pull  cycloSPORINE (NEOral) microemulsion solution 100 mg  etomidate (Amidate) injection 20 mg  fentaNYL PF (Sublimaze) injection 100 mcg  midazolam (Versed) injection 4 mg  fentaNYL bolus from bag 25 mcg  fentaNYL bolus from bag 25 mcg  fentanyl (Sublimaze) 1000 mcg in sodium chloride 0.9% 100 mL (10 mcg/mL) infusion (premix)  propofol (Diprivan) infusion  rocuronium (ZeMuron) injection 97 mg  dexmedeTOMIDine (Precedex) 400 mcg in 100 mL (4 mcg/mL) sodium chloride 0.9% infusion  lactated Ringer's bolus 500 mL  oxygen (O2) therapy  sodium phosphate 15 mmol in sodium chloride 0.9% 250 mL IV  glucagon (Glucagen) injection 1 mg  dextrose 50 % injection 25 g  glucagon (Glucagen) injection 1 mg  dextrose 50 % injection 12.5 g  iohexol (OMNIPaque) 350 mg iodine/mL solution 75 mL  acyclovir (Zovirax) 500 mg in dextrose 5% 100 mL IV  lactated Ringer's bolus 500 mL  mycophenolate (Cellcept) suspension 500 mg  vancomycin in dextrose 5 % (Vancocin) IVPB 500 mg  metoprolol tartrate (Lopressor) tablet 12.5 mg  ipratropium-albuteroL (Duo-Neb) 0.5-2.5 mg/3 mL nebulizer solution 3 mL  desvenlafaxine (Pristiq) 24 hr tablet 100 mg  metoprolol tartrate (Lopressor) tablet 12.5 mg  cloZAPine (Clozaril) tablet 25 mg  carvedilol (Coreg) tablet 6.25 mg  cloZAPine (Clozaril) tablet 25 mg  vancomycin in dextrose 5 % (Vancocin) IVPB 500 mg  cloZAPine (Clozaril) tablet 50 mg  polyethylene glycol (Glycolax, Miralax) packet 17 g  glucagon (Glucagen) injection 1 mg  dextrose 50 % injection 25 g  glucagon (Glucagen) injection 1 mg  dextrose 50 % injection 12.5 g  heparin 25,000 Units in dextrose 5% 250 mL (100 Units/mL) infusion (premix)  vancomycin (Vancocin) pharmacy to dose - pharmacy monitoring  mycophenolate (Cellcept) suspension 500 mg  furosemide (Lasix)  "injection 40 mg  dextrose 50 % injection 25 g  micafungin (Mycamine) 100 mg in dextrose 5% 100 mL IV  D5 % and 0.9 % sodium chloride infusion  lidocaine (Xylocaine) 10 mg/mL (1 %) injection 50 mg  alteplase (Cathflo Activase) injection 2 mg  vancomycin in dextrose 5 % (Vancocin) IVPB 500 mg  furosemide (Lasix) injection 40 mg  oxygen (O2) therapy  vancomycin in dextrose 5 % (Vancocin) IVPB 500 mg      Relevant Results  Labs  Results from last 72 hours   Lab Units 06/08/24  0530 06/07/24  0634 06/06/24  0947   WBC AUTO x10*3/uL 12.4* 13.8* 14.4*   HEMOGLOBIN g/dL 10.5* 11.3* 12.1   HEMATOCRIT % 35.1* 37.8 38.9   PLATELETS AUTO x10*3/uL 119* 115* 135*   NEUTROS PCT AUTO % 87.2 85.8 86.2   LYMPHS PCT AUTO % 3.3 3.4 3.5   MONOS PCT AUTO % 6.5 7.7 7.8   EOS PCT AUTO % 1.7 1.7 1.1     Results from last 72 hours   Lab Units 06/08/24  0530 06/07/24  1149 06/07/24  0635   SODIUM mmol/L 146* 145 144   POTASSIUM mmol/L 3.8 4.2 4.1   CHLORIDE mmol/L 108* 107 109*   CO2 mmol/L 30 28 25   BUN mg/dL 38* 37* 36*   CREATININE mg/dL 1.72* 1.79* 1.79*   GLUCOSE mg/dL 105* 149* 47*   CALCIUM mg/dL 7.9* 7.9* 8.3*   ANION GAP mmol/L 12 14 14   EGFR mL/min/1.73m*2 32* 31* 31*   PHOSPHORUS mg/dL 2.3* 2.7 2.6     Results from last 72 hours   Lab Units 06/08/24  0530 06/07/24  1149 06/07/24  0635   ALK PHOS U/L 158* 161* 149*   BILIRUBIN TOTAL mg/dL 0.8 1.1 0.9   BILIRUBIN DIRECT mg/dL  --  0.6*  --    PROTEIN TOTAL g/dL 4.7* 4.5* 4.9*   ALT U/L 51* 60* 59*   AST U/L 88* 108* 113*   ALBUMIN g/dL 2.4* 2.5* 2.6*     Estimated Creatinine Clearance: 34.2 mL/min (A) (by C-G formula based on SCr of 1.72 mg/dL (H)).  No results found for: \"CRP\"  Microbiology    Imaging              Assessment/Plan   69 y.o. female with past medical history of renal transplant-in 2015, on cyclosporine, MMF, prednisone, hypertension, hyperlipidemia, COPD, CHF, A-fib, peripheral arterial disease, lymphedema was transferred from outside hospital for concerns of " emphysematous pyelonephritis of the transplanted kidney and tiny right transplant non obstructing renal stone along with acute pancreatitis. Transferred to [facility] on 6/2. ID consulted for emphysematous pyelonephritis. Patient was seen by ID at [facility] and was recommended to continue Meropenem and Vancomycin. Most likely microbiology for emphysematous UTI's are gram negative microorganisms such as Klebsiella and E Coli. For now will treat her empirically with broad spectrum abx Meropenem (for possible resistant organisms).     Plan  Recommend to continue meropenem IV (renally dosed).  Recommend to stop vancomycin and micafungin.  Will follow.        Ray Bailey MD

## 2024-06-09 ENCOUNTER — APPOINTMENT (OUTPATIENT)
Dept: RADIOLOGY | Facility: HOSPITAL | Age: 69
End: 2024-06-09
Payer: MEDICARE

## 2024-06-09 LAB
ALBUMIN SERPL BCP-MCNC: 2.2 G/DL (ref 3.4–5)
ALBUMIN SERPL BCP-MCNC: 2.7 G/DL (ref 3.4–5)
ALP SERPL-CCNC: 194 U/L (ref 33–136)
ALT SERPL W P-5'-P-CCNC: 47 U/L (ref 7–45)
ANION GAP SERPL CALC-SCNC: 11 MMOL/L (ref 10–20)
ANION GAP SERPL CALC-SCNC: 15 MMOL/L (ref 10–20)
AST SERPL W P-5'-P-CCNC: 97 U/L (ref 9–39)
BASOPHILS # BLD AUTO: 0.04 X10*3/UL (ref 0–0.1)
BASOPHILS NFR BLD AUTO: 0.3 %
BILIRUB SERPL-MCNC: 0.8 MG/DL (ref 0–1.2)
BUN SERPL-MCNC: 31 MG/DL (ref 6–23)
BUN SERPL-MCNC: 33 MG/DL (ref 6–23)
CA-I BLD-SCNC: 1.04 MMOL/L (ref 1.1–1.33)
CALCIUM SERPL-MCNC: 7.4 MG/DL (ref 8.6–10.6)
CALCIUM SERPL-MCNC: 7.7 MG/DL (ref 8.6–10.6)
CHLORIDE SERPL-SCNC: 106 MMOL/L (ref 98–107)
CHLORIDE SERPL-SCNC: 110 MMOL/L (ref 98–107)
CO2 SERPL-SCNC: 27 MMOL/L (ref 21–32)
CO2 SERPL-SCNC: 30 MMOL/L (ref 21–32)
CREAT SERPL-MCNC: 1.24 MG/DL (ref 0.5–1.05)
CREAT SERPL-MCNC: 1.52 MG/DL (ref 0.5–1.05)
CYCLOSPORINE BLD IA-MCNC: 125 NG/ML (ref 80–210)
EGFRCR SERPLBLD CKD-EPI 2021: 37 ML/MIN/1.73M*2
EGFRCR SERPLBLD CKD-EPI 2021: 48 ML/MIN/1.73M*2
EOSINOPHIL # BLD AUTO: 0.2 X10*3/UL (ref 0–0.7)
EOSINOPHIL NFR BLD AUTO: 1.4 %
ERYTHROCYTE [DISTWIDTH] IN BLOOD BY AUTOMATED COUNT: 16.9 % (ref 11.5–14.5)
GLUCOSE BLD MANUAL STRIP-MCNC: 103 MG/DL (ref 74–99)
GLUCOSE BLD MANUAL STRIP-MCNC: 107 MG/DL (ref 74–99)
GLUCOSE BLD MANUAL STRIP-MCNC: 121 MG/DL (ref 74–99)
GLUCOSE BLD MANUAL STRIP-MCNC: 125 MG/DL (ref 74–99)
GLUCOSE BLD MANUAL STRIP-MCNC: 137 MG/DL (ref 74–99)
GLUCOSE BLD MANUAL STRIP-MCNC: 185 MG/DL (ref 74–99)
GLUCOSE SERPL-MCNC: 112 MG/DL (ref 74–99)
GLUCOSE SERPL-MCNC: 172 MG/DL (ref 74–99)
HCT VFR BLD AUTO: 36.1 % (ref 36–46)
HGB BLD-MCNC: 10.8 G/DL (ref 12–16)
IMM GRANULOCYTES # BLD AUTO: 0.15 X10*3/UL (ref 0–0.7)
IMM GRANULOCYTES NFR BLD AUTO: 1.1 % (ref 0–0.9)
LYMPHOCYTES # BLD AUTO: 0.38 X10*3/UL (ref 1.2–4.8)
LYMPHOCYTES NFR BLD AUTO: 2.7 %
MAGNESIUM SERPL-MCNC: 2.08 MG/DL (ref 1.6–2.4)
MAGNESIUM SERPL-MCNC: 2.32 MG/DL (ref 1.6–2.4)
MCH RBC QN AUTO: 35.2 PG (ref 26–34)
MCHC RBC AUTO-ENTMCNC: 29.9 G/DL (ref 32–36)
MCV RBC AUTO: 118 FL (ref 80–100)
MONOCYTES # BLD AUTO: 0.89 X10*3/UL (ref 0.1–1)
MONOCYTES NFR BLD AUTO: 6.3 %
NEUTROPHILS # BLD AUTO: 12.42 X10*3/UL (ref 1.2–7.7)
NEUTROPHILS NFR BLD AUTO: 88.2 %
NRBC BLD-RTO: 0 /100 WBCS (ref 0–0)
PHOSPHATE SERPL-MCNC: 1.6 MG/DL (ref 2.5–4.9)
PHOSPHATE SERPL-MCNC: 2.4 MG/DL (ref 2.5–4.9)
PLATELET # BLD AUTO: 141 X10*3/UL (ref 150–450)
POTASSIUM SERPL-SCNC: 3.6 MMOL/L (ref 3.5–5.3)
POTASSIUM SERPL-SCNC: 4.5 MMOL/L (ref 3.5–5.3)
PROT SERPL-MCNC: 4.1 G/DL (ref 6.4–8.2)
RBC # BLD AUTO: 3.07 X10*6/UL (ref 4–5.2)
SODIUM SERPL-SCNC: 143 MMOL/L (ref 136–145)
SODIUM SERPL-SCNC: 147 MMOL/L (ref 136–145)
STAPHYLOCOCCUS SPEC CULT: NORMAL
UFH PPP CHRO-ACNC: 0.4 IU/ML
WBC # BLD AUTO: 14.1 X10*3/UL (ref 4.4–11.3)

## 2024-06-09 PROCEDURE — 82330 ASSAY OF CALCIUM: CPT

## 2024-06-09 PROCEDURE — 99233 SBSQ HOSP IP/OBS HIGH 50: CPT

## 2024-06-09 PROCEDURE — 83735 ASSAY OF MAGNESIUM: CPT | Mod: 91

## 2024-06-09 PROCEDURE — 2500000001 HC RX 250 WO HCPCS SELF ADMINISTERED DRUGS (ALT 637 FOR MEDICARE OP)

## 2024-06-09 PROCEDURE — 74018 RADEX ABDOMEN 1 VIEW: CPT | Performed by: RADIOLOGY

## 2024-06-09 PROCEDURE — 82947 ASSAY GLUCOSE BLOOD QUANT: CPT | Mod: 91

## 2024-06-09 PROCEDURE — 2500000004 HC RX 250 GENERAL PHARMACY W/ HCPCS (ALT 636 FOR OP/ED)

## 2024-06-09 PROCEDURE — 82947 ASSAY GLUCOSE BLOOD QUANT: CPT

## 2024-06-09 PROCEDURE — 85520 HEPARIN ASSAY: CPT

## 2024-06-09 PROCEDURE — 80158 DRUG ASSAY CYCLOSPORINE: CPT | Performed by: INTERNAL MEDICINE

## 2024-06-09 PROCEDURE — 74018 RADEX ABDOMEN 1 VIEW: CPT

## 2024-06-09 PROCEDURE — 80053 COMPREHEN METABOLIC PANEL: CPT

## 2024-06-09 PROCEDURE — 84100 ASSAY OF PHOSPHORUS: CPT

## 2024-06-09 PROCEDURE — P9045 ALBUMIN (HUMAN), 5%, 250 ML: HCPCS | Mod: JZ

## 2024-06-09 PROCEDURE — 99233 SBSQ HOSP IP/OBS HIGH 50: CPT | Performed by: INTERNAL MEDICINE

## 2024-06-09 PROCEDURE — 80069 RENAL FUNCTION PANEL: CPT | Mod: CCI

## 2024-06-09 PROCEDURE — 85025 COMPLETE CBC W/AUTO DIFF WBC: CPT

## 2024-06-09 PROCEDURE — 1200000002 HC GENERAL ROOM WITH TELEMETRY DAILY

## 2024-06-09 PROCEDURE — 83735 ASSAY OF MAGNESIUM: CPT

## 2024-06-09 PROCEDURE — 2500000002 HC RX 250 W HCPCS SELF ADMINISTERED DRUGS (ALT 637 FOR MEDICARE OP, ALT 636 FOR OP/ED)

## 2024-06-09 PROCEDURE — 2500000005 HC RX 250 GENERAL PHARMACY W/O HCPCS

## 2024-06-09 RX ORDER — FUROSEMIDE 10 MG/ML
80 INJECTION INTRAMUSCULAR; INTRAVENOUS ONCE
Status: COMPLETED | OUTPATIENT
Start: 2024-06-09 | End: 2024-06-09

## 2024-06-09 RX ORDER — ALBUMIN HUMAN 50 G/1000ML
25 SOLUTION INTRAVENOUS ONCE
Status: COMPLETED | OUTPATIENT
Start: 2024-06-09 | End: 2024-06-09

## 2024-06-09 RX ADMIN — CYCLOSPORINE 100 MG: 100 SOLUTION ORAL at 18:12

## 2024-06-09 RX ADMIN — ALBUMIN HUMAN 25 G: 0.05 INJECTION, SOLUTION INTRAVENOUS at 16:31

## 2024-06-09 RX ADMIN — Medication 4 L/MIN: at 08:00

## 2024-06-09 RX ADMIN — MEROPENEM 1 G: 1 INJECTION, POWDER, FOR SOLUTION INTRAVENOUS at 11:15

## 2024-06-09 RX ADMIN — DEXTROSE AND SODIUM CHLORIDE 50 ML/HR: 5; 450 INJECTION, SOLUTION INTRAVENOUS at 11:20

## 2024-06-09 RX ADMIN — SENNOSIDES AND DOCUSATE SODIUM 2 TABLET: 50; 8.6 TABLET ORAL at 08:43

## 2024-06-09 RX ADMIN — CYCLOSPORINE 100 MG: 100 SOLUTION ORAL at 06:30

## 2024-06-09 RX ADMIN — MYCOPHENOLATE MOFETIL 500 MG: 200 POWDER, FOR SUSPENSION ORAL at 05:59

## 2024-06-09 RX ADMIN — PREDNISONE 5 MG: 5 TABLET ORAL at 08:43

## 2024-06-09 RX ADMIN — MEROPENEM 1 G: 1 INJECTION, POWDER, FOR SOLUTION INTRAVENOUS at 23:30

## 2024-06-09 RX ADMIN — FUROSEMIDE 80 MG: 10 INJECTION, SOLUTION INTRAMUSCULAR; INTRAVENOUS at 16:30

## 2024-06-09 RX ADMIN — POLYETHYLENE GLYCOL 3350 17 G: 17 POWDER, FOR SOLUTION ORAL at 08:43

## 2024-06-09 RX ADMIN — LEVOTHYROXINE SODIUM 50 MCG: 0.05 TABLET ORAL at 05:59

## 2024-06-09 RX ADMIN — SENNOSIDES AND DOCUSATE SODIUM 2 TABLET: 50; 8.6 TABLET ORAL at 20:18

## 2024-06-09 RX ADMIN — MYCOPHENOLATE MOFETIL 500 MG: 200 POWDER, FOR SUSPENSION ORAL at 18:12

## 2024-06-09 RX ADMIN — HEPARIN SODIUM 1100 UNITS/HR: 10000 INJECTION, SOLUTION INTRAVENOUS at 09:34

## 2024-06-09 ASSESSMENT — COGNITIVE AND FUNCTIONAL STATUS - GENERAL
PERSONAL GROOMING: A LOT
TOILETING: TOTAL
DRESSING REGULAR UPPER BODY CLOTHING: A LOT
MOVING FROM LYING ON BACK TO SITTING ON SIDE OF FLAT BED WITH BEDRAILS: A LOT
DAILY ACTIVITIY SCORE: 10
TURNING FROM BACK TO SIDE WHILE IN FLAT BAD: A LOT
CLIMB 3 TO 5 STEPS WITH RAILING: TOTAL
DRESSING REGULAR LOWER BODY CLOTHING: TOTAL
STANDING UP FROM CHAIR USING ARMS: TOTAL
WALKING IN HOSPITAL ROOM: TOTAL
EATING MEALS: A LOT
HELP NEEDED FOR BATHING: A LOT
MOVING TO AND FROM BED TO CHAIR: TOTAL
MOBILITY SCORE: 8

## 2024-06-09 ASSESSMENT — PAIN SCALES - GENERAL
PAINLEVEL_OUTOF10: 0 - NO PAIN

## 2024-06-09 ASSESSMENT — PAIN - FUNCTIONAL ASSESSMENT: PAIN_FUNCTIONAL_ASSESSMENT: 0-10

## 2024-06-09 NOTE — CARE PLAN
Problem: Pain  Goal: My pain/discomfort is manageable  6/9/2024 0751 by Minda Thomas RN  Outcome: Progressing  6/8/2024 1811 by Minda Thomas RN  Outcome: Progressing     Problem: Safety  Goal: Patient will be injury free during hospitalization  6/9/2024 0751 by Minda Thomas RN  Outcome: Progressing  6/8/2024 1811 by Minda Thomas RN  Outcome: Progressing  Goal: I will remain free of falls  6/9/2024 0751 by Minda Thomas RN  Outcome: Progressing  6/8/2024 1811 by Minda Thomas RN  Outcome: Progressing     Problem: Daily Care  Goal: Daily care needs are met  6/9/2024 0751 by Minda Thomas RN  Outcome: Progressing  6/8/2024 1811 by Minda Thomas RN  Outcome: Progressing     Problem: Psychosocial Needs  Goal: Demonstrates ability to cope with hospitalization/illness  6/9/2024 0751 by Minda Thomas, RN  Outcome: Progressing  6/8/2024 1811 by Minda Thomas RN  Outcome: Progressing  Goal: Collaborate with me, my family, and caregiver to identify my specific goals  6/9/2024 0751 by Minda Thomas, RN  Outcome: Progressing  6/8/2024 1811 by Minda Thomas RN  Outcome: Progressing     Problem: Discharge Barriers  Goal: My discharge needs are met  6/9/2024 0751 by Minda Thomas, RN  Outcome: Progressing  6/8/2024 1811 by Minda Thomas RN  Outcome: Progressing     Problem: Skin  Goal: Participates in plan/prevention/treatment measures  6/9/2024 0751 by Minda Thomas, RN  Outcome: Progressing  6/8/2024 1811 by Minda Thomas RN  Outcome: Progressing  Goal: Prevent/manage excess moisture  6/9/2024 0751 by Minda Thomas, RN  Outcome: Progressing  6/8/2024 1811 by Minda Thomas RN  Outcome: Progressing  Goal: Prevent/minimize sheer/friction injuries  6/9/2024 0751 by Minda Thomas, RN  Outcome: Progressing  6/8/2024 1811 by Minda Thomas RN  Outcome: Progressing  Goal: Promote/optimize nutrition  6/9/2024 0751 by Minda Thomas RN  Outcome: Progressing  Flowsheets (Taken  6/9/2024 0751)  Promote/optimize nutrition:   Assist with feeding   Offer water/supplements/favorite foods   Consume > 50% meals/supplements   Monitor/record intake including meals  6/8/2024 1811 by Minda Thomas RN  Outcome: Progressing  Goal: Promote skin healing  6/9/2024 0751 by Minda Thomas RN  Outcome: Progressing  6/8/2024 1811 by Minda Thomas RN  Outcome: Progressing  Flowsheets (Taken 6/8/2024 1811)  Promote skin healing:   Assess skin/pad under line(s)/device(s)   Turn/reposition every 2 hours/use positioning/transfer devices     Problem: Respiratory  Goal: Clear secretions with interventions this shift  6/9/2024 0751 by Minda Thomas RN  Outcome: Progressing  6/8/2024 1811 by Minda Thomas RN  Outcome: Progressing  Goal: Minimize anxiety/maximize coping throughout shift  6/9/2024 0751 by Minda Thomas RN  Outcome: Progressing  6/8/2024 1811 by Minda Thomas RN  Outcome: Progressing  Goal: Minimal/no exertional discomfort or dyspnea this shift  6/9/2024 0751 by Minda Thomas, RN  Outcome: Progressing  6/8/2024 1811 by Minda Thomas RN  Outcome: Progressing  Goal: No signs of respiratory distress (eg. Use of accessory muscles. Peds grunting)  6/9/2024 0751 by Minda Thomas, RN  Outcome: Progressing  6/8/2024 1811 by Minda Thomas RN  Outcome: Progressing  Goal: Patent airway maintained this shift  6/9/2024 0751 by Minda Thomas, RN  Outcome: Progressing  6/8/2024 1811 by Minda Thomas RN  Outcome: Progressing  Goal: Tolerate mechanical ventilation evidenced by VS/agitation level this shift  6/9/2024 0751 by Minda Thomas, RN  Outcome: Progressing  6/8/2024 1811 by Minda Thomas RN  Outcome: Progressing  Goal: Tolerate pulmonary toileting this shift  6/9/2024 0751 by Minda Thomas, RN  Outcome: Progressing  6/8/2024 1811 by Minda Thomas RN  Outcome: Progressing  Goal: Verbalize decreased shortness of breath this shift  6/9/2024 0751 by Minda Thomas,  RN  Outcome: Progressing  6/8/2024 1811 by Minda Thomas RN  Outcome: Progressing  Goal: Wean oxygen to maintain O2 saturation per order/standard this shift  6/9/2024 0751 by Minda Thomas RN  Outcome: Progressing  6/8/2024 1811 by Minda Thomas RN  Outcome: Progressing  Goal: Increase self care and/or family involvement in next 24 hours  6/9/2024 0751 by Minda Thomas RN  Outcome: Progressing  6/8/2024 1811 by Minda Thomas RN  Outcome: Progressing   The patient's goals for the shift include      The clinical goals for the shift include pts blood glucose will be WNL during this shift    Patient alert and oriented x 3 with no pain complaints. TF currently running @ 35ml/hr, pt tolerating well. Blood glucose WNL throughout this shift. 80mg IVP lasixs administered along with albumin. Patient repositioned. VSS, call light within reach    Minda Thomas RN

## 2024-06-09 NOTE — CARE PLAN
The patient's goals for the shift include      The clinical goals for the shift include pt. will remain resp. distress free.

## 2024-06-09 NOTE — PROGRESS NOTES
"Lea Paulino is a 69 y.o. female on day 6 of admission presenting with Pyelonephritis.    Subjective   Pt was hypoglycemic overnight down to 70, given amp D50. DHT placed yesterday and tube feeds started, running at 15 ml/hr when seen this morning.    Patient was very lethargic this mroning, Aox1. Had to be constantly shaken awake to answer questions or open eyes.    Objective     General appearance: somnolent  HEENT: Atraumatic and normocephalic  CARDIAC: Regular   LUNGS: Clear  ABDOMEN: Soft and nontender  EXTREMITIES: 3+ edema  SKIN: Ecchymosis and erythema noted over both arms from blood draws.  Right upper arm dual-lumen PICC line noted.     Last Recorded Vitals  Blood pressure 151/77, pulse 85, temperature 36 °C (96.8 °F), temperature source Temporal, resp. rate 19, height 1.575 m (5' 2.01\"), weight 97.8 kg (215 lb 9.8 oz), SpO2 95%.  Intake/Output last 3 Shifts:  I/O last 3 completed shifts:  In: 1510.8 (15.4 mL/kg) [I.V.:1410.8 (14.4 mL/kg); IV Piggyback:100]  Out: 1000 (10.2 mL/kg) [Urine:1000 (0.3 mL/kg/hr)]  Weight: 97.8 kg     Relevant Results  Scheduled medications  [Held by provider] carvedilol, 6.25 mg, oral, BID  cycloSPORINE, 100 mg, oral, q12h MARTIN  desvenlafaxine succinate, 100 mg, oral, Daily  levothyroxine, 50 mcg, oral, q AM  lidocaine, 5 mL, infiltration, Once  meropenem, 1 g, intravenous, q12h  mycophenolate, 500 mg, oral, BID  oxygen, , inhalation, Continuous - Inhalation  polyethylene glycol, 17 g, oral, Daily  predniSONE, 5 mg, oral, Daily  sennosides-docusate sodium, 2 tablet, oral, BID      Continuous medications  dextrose 5%-0.45 % sodium chloride, 50 mL/hr, Last Rate: 50 mL/hr (06/09/24 3665)  heparin, 0-4,500 Units/hr, Last Rate: 1,100 Units/hr (06/09/24 4253)      PRN medications  PRN medications: alteplase, dextrose, dextrose, glucagon, glucagon, sodium chloride       Results for orders placed or performed during the hospital encounter of 06/03/24 (from the past 24 hour(s)) "   POCT GLUCOSE   Result Value Ref Range    POCT Glucose 70 (L) 74 - 99 mg/dL   POCT GLUCOSE   Result Value Ref Range    POCT Glucose 102 (H) 74 - 99 mg/dL   POCT GLUCOSE   Result Value Ref Range    POCT Glucose 103 (H) 74 - 99 mg/dL   CBC and Auto Differential   Result Value Ref Range    WBC 14.1 (H) 4.4 - 11.3 x10*3/uL    nRBC 0.0 0.0 - 0.0 /100 WBCs    RBC 3.07 (L) 4.00 - 5.20 x10*6/uL    Hemoglobin 10.8 (L) 12.0 - 16.0 g/dL    Hematocrit 36.1 36.0 - 46.0 %     (H) 80 - 100 fL    MCH 35.2 (H) 26.0 - 34.0 pg    MCHC 29.9 (L) 32.0 - 36.0 g/dL    RDW 16.9 (H) 11.5 - 14.5 %    Platelets 141 (L) 150 - 450 x10*3/uL    Neutrophils % 88.2 40.0 - 80.0 %    Immature Granulocytes %, Automated 1.1 (H) 0.0 - 0.9 %    Lymphocytes % 2.7 13.0 - 44.0 %    Monocytes % 6.3 2.0 - 10.0 %    Eosinophils % 1.4 0.0 - 6.0 %    Basophils % 0.3 0.0 - 2.0 %    Neutrophils Absolute 12.42 (H) 1.20 - 7.70 x10*3/uL    Immature Granulocytes Absolute, Automated 0.15 0.00 - 0.70 x10*3/uL    Lymphocytes Absolute 0.38 (L) 1.20 - 4.80 x10*3/uL    Monocytes Absolute 0.89 0.10 - 1.00 x10*3/uL    Eosinophils Absolute 0.20 0.00 - 0.70 x10*3/uL    Basophils Absolute 0.04 0.00 - 0.10 x10*3/uL   Comprehensive metabolic panel   Result Value Ref Range    Glucose 112 (H) 74 - 99 mg/dL    Sodium 147 (H) 136 - 145 mmol/L    Potassium 4.5 3.5 - 5.3 mmol/L    Chloride 110 (H) 98 - 107 mmol/L    Bicarbonate 27 21 - 32 mmol/L    Anion Gap 15 10 - 20 mmol/L    Urea Nitrogen 33 (H) 6 - 23 mg/dL    Creatinine 1.52 (H) 0.50 - 1.05 mg/dL    eGFR 37 (L) >60 mL/min/1.73m*2    Calcium 7.4 (L) 8.6 - 10.6 mg/dL    Albumin 2.2 (L) 3.4 - 5.0 g/dL    Alkaline Phosphatase 194 (H) 33 - 136 U/L    Total Protein 4.1 (L) 6.4 - 8.2 g/dL    AST 97 (H) 9 - 39 U/L    Bilirubin, Total 0.8 0.0 - 1.2 mg/dL    ALT 47 (H) 7 - 45 U/L   Magnesium   Result Value Ref Range    Magnesium 2.32 1.60 - 2.40 mg/dL   Cyclosporine level   Result Value Ref Range    Cyclosporine 125 80 - 210 ng/mL    Phosphorus   Result Value Ref Range    Phosphorus 2.4 (L) 2.5 - 4.9 mg/dL   Heparin Assay, UFH   Result Value Ref Range    Heparin Unfractionated 0.4 See Comment Below for Therapeutic Ranges IU/mL   POCT GLUCOSE   Result Value Ref Range    POCT Glucose 125 (H) 74 - 99 mg/dL   POCT GLUCOSE   Result Value Ref Range    POCT Glucose 107 (H) 74 - 99 mg/dL   POCT GLUCOSE   Result Value Ref Range    POCT Glucose 137 (H) 74 - 99 mg/dL   Calcium, ionized   Result Value Ref Range    POCT Calcium, Ionized 1.04 (L) 1.1 - 1.33 mmol/L   POCT GLUCOSE   Result Value Ref Range    POCT Glucose 121 (H) 74 - 99 mg/dL        XR abdomen 1 view    Result Date: 6/9/2024  Interpreted By:  Kirby Mclaughlin and Weaver Jakob STUDY: XR ABDOMEN 1 VIEW; 6/9/2024 12:30 am   INDICATION: Signs/Symptoms:dobhoff placement.   COMPARISON: Abdominal radiograph 06/04/2024, chest radiograph 06/08/2024, CT 06/04/2024   ACCESSION NUMBER(S): [id number]   ORDERING CLINICIAN: AYDE LEUNG   FINDINGS: Single AP radiograph of the abdomen was obtained. IVC filter in place.   An enteric tube is seen coursing with the esophagus, below the left hemidiaphragm, and terminating at the level of the proximal duodenum.   There is a nonobstructive bowel gas pattern. There is no supine evidence of pneumoperitoneum.   No radiopaque foreign body or soft tissue gas. No acute osseous abnormality.   Right-greater-than-left blunting of the costophrenic angles similar to 06/08/2024 chest radiograph.       1. Enteric tube tip projects over the proximal duodenum. 2. Right-greater-than-left pleural effusions similar to 06/08/2024 chest radiograph.   I personally reviewed the images/study and I agree with the findings as stated. This study was interpreted at [place], [place].   Signed by: Kirby Mclaughlin 6/9/2024 10:01 AM Dictation workstation:   [id number]    XR chest 1 view    Result Date: 6/8/2024  Interpreted By:  Kirby Mclaughlin,   and Dina Madrid STUDY: XR CHEST 1 VIEW;  6/8/2024 3:53 pm   INDICATION: Signs/Symptoms:c/f pulm edema.   COMPARISON: Chest radiograph dated 06/07/2024 CT chest abdomen pelvis dated 06/04/2024   ACCESSION NUMBER(S): [id number]   ORDERING CLINICIAN: AYDE LEUNG   FINDINGS: AP radiograph of the chest was provided.   Right upper extremity PICC is seen with the tip overlying the lower SVC.   CARDIOMEDIASTINAL SILHOUETTE: Cardiomediastinal silhouette is stable in size and configuration, persistently enlarged.   LUNGS: No significant interval change in hazy right greater than left basilar opacities and blunting of the costophrenic angles. Prominence of the pulmonary vascular/interstitial markings, similar to the prior. No pneumothorax.   ABDOMEN: No remarkable upper abdominal findings.   BONES: No acute osseous changes.       1. No significant change in bibasilar opacities likely small right-greater-than-left pleural effusions and associated atelectasis/consolidation. 2. Similar mild prominence of the pulmonary vascular/interstitial markings.   I personally reviewed the images/study and I agree with the findings as stated by resident physician Dr. Arian Arroyo.   MACRO: None   Signed by: Kirby Mclaughlin 6/8/2024 6:20 PM Dictation workstation:   [id number]        Assessment/Plan   Principal Problem:    Pyelonephritis    Lea Paulino is a 69 y.o. female with a history of COPD, renal transplant in 2015 on cyclosporine, mycophenolate and prednisone, HTN, HLD, hypothyroid, hyperparathyroid, HFmEF (45-50%), AFib, PVD, lymphedema, admitted for emphysematous pyelonephritis and acute pancreatitis in the setting of severe hypercalcemia. Initially treated with IV fluids and abx. Transferred to MICU 6/4 for AHRF s/t pulmonary edema and intubated 6/4-6/5 for airway protection. Extubated 6/5, stable on 2.5L transferred back to floor. Transplant ID and nephrology following patient.     RR called 6/7 for lethargy and  increased work of breathing, found to be hypoglycemic with worsening pulmonary edema on CXR. Started on D5 drip, airvo and diuresed with slight improvement in mentation and work of breathing. Endocrine consulted for persistent hypoglycemia, recommended starting tube feeds. DHT placed 6/8.      Updates:  -Aox1 this AM, more alert on rounds later  -increase TF rate to 25 ml/hr, if BG goes above 150, stop D5 1/2 NS  -MBS once mentation improves  -given 25 g albumin and 80 mg IV lasix today for suspected third spacing with intravascular depletion  -Na 147, increase FWF to 300 q4  -on 4L NC, wean as able  -CMV titer undetected, EBV detected, BK detected viral load 28    #Lethargy  #AMS  :: suspected to be multifactorial s/t hypoglycemia + delirium + infection +/- hypoxia  -CTH w/o acute ischemic or hemorrhage  -CXR w/ right pleural effusion with adjacent atelectasis/consolidation and interval development of a small left pleural effusion with adjacent atelectasis  -repeat Cmp, cbc, Mg unremarkable  -blood cultures NG  -ECG unchanged from prior  -c/w D5 NS 50 ml/hr  -DHT placed 6/8, started Tfs  -c/w meropenem  -q4 glucose checks    #AHRF  :: CXR w/ right pleural effusion with adjacent atelectasis/consolidation and interval development of a small left pleural effusion with adjacent atelectasis  -on arivo overnight 6/7, weaned to 4L NC  -diuresing prm  -given 25 g albumin and 80 mg IV lasix today for suspected third spacing with intravascular depletion  -monitor UOP  -wean O2 as able    #Hypercalcemia, resolved, now hypocalcemic  :: ddx primary hyperparathyroidism w/ remaining parathyroid tissue vs less likely MEN1 given possible recurrent, significant hypoglycemia concerning for insulinoma  :: s/p subtotal hemithyroidectomy & parathyroidectomy in 2006   :: Calcium 15.9 at [facility]  :: Potentially the culprit for her presentation of AMS/confusion, pancreatitis, vomiting, large stool burden and constipation, and kidney  stones  :: Given severity, was treated at [facility] with [place] calcitonin 4 units/kg subcutaneously and high-volume IV normal saline   - iPTH appropriately low on admission, repeat elevated at 204  -PTHrP, Vitamin D 25 and D-1,25 levels WNL, pending SPEP, UPEP   -daily ical, phos and mag levels   -hold home calcitriol  -endocrine consulted f/up recs     #Emphysematous pyelonephritis  :: CT A/P with gas in the RLQ transplant kidney collecting system and in the urinary bladder, possible gas-forming infection  :: s/p vanc/zosyn and meropenem/gentamicin at OSH  :: previously grew pan-sensitive Pseudomonas (4/24) and E. faecalis (resistant to gentamicin, intermediate to doxycycline 1/22)  -Bcx 6/3 NGTD  -Ucx 6/3 and 6/6 NGTD  -Transplant ID consulted, appreciate recs  -s/p vanc 6/3-6/8  -c/w meropenem 6/3-*  -consider urology c/s for nephrostomy tube if becomes hemodynamically unstable     #LAURA on CKD of transplanted kidney (2015), improving  :: Follows with Dr Gay at  for renal transplant, on mycophenolate, cyclosporine, prednisone  :: Creatinine on admission to [facility] 2.7 (baseline 1.5-1.7)  :: LAURA likely prerenal in setting of pancreatitis and hypovolemia  -Transplant nephrology following  -continue cyclosporine 100 BID, prednisone 5 mg daily, decrease MMF to 500 BID (home dose 1 g daily)  -CMV titer undetected, EBV detected, BK detected viral load 28     #Acute pancreatitis, resolved  :: likely secondary to hypercalcemia (15.9)   :: Lipase 1039, CT findings supportive, low back pain initial presentation    - triglycerides obtained 82  - can repeat CT in a few weeks regarding fluid collections     #Reported nonsustained VT at [facility]  #Hypertension  #HFmrEF  - home metoprolol tartrate 12.5 BID switched to coreg 6.25 mg BID  - Optimize electrolytes K > 4, Mg > 2  - EKG 6/4 -> NSR, Left axis deviation  - Holding Bumex 1 mg for now  - Echo- EF 40-45%      #Bipolar Disorder  -Psych consulted    -resume home desvenlafaxine 100mg  -start home clozapine at 25 mg at night, starting 6/7 increase to home dose of 50 mg      #Afib  #Hx of DVT LLE 2022  -c/w coreg 6.25 mg BID  -holding home eliquis, on heparin gtt      #Hypothyroidism  - Continue levothyroxine     F: diuresing  E: replete prn, monitor Ca  N: regular  A: PIV  DVT ppx: heparin gtt for afib     Code status: full  NOK: Son  [phone]        Opal Chanel MD

## 2024-06-09 NOTE — PROGRESS NOTES
"        INPATIENT TRANSPLANT NEPHROLOGY PROGRESS NOTE          REASON FOR CONSULT:  Immunosuppressive medication management and nephrology related issues.    SUBJECTION:     No events overnight.   Continue diuresis   ml  Net IO positive 710 mL      PHYCISCAL EXAMINATION:    Visit Vitals  /78 (BP Location: Left leg, Patient Position: Lying)   Pulse 85   Temp 36 °C (96.8 °F) (Temporal)   Resp 19   Ht 1.575 m (5' 2.01\")   Wt 97.8 kg (215 lb 9.8 oz)   SpO2 95%   BMI 39.43 kg/m²   Smoking Status Unknown   BSA 2.07 m²        06/07 1900 - 06/09 0659  In: 1510.8 [I.V.:1410.8]  Out: 1000 [Urine:1000]     Weight change:     General Appearance - NAD, Good speech, oriented and alert  HEENT - Supple. Not pale. No jaundice. No cervical lymphadenopathy. Pharynx and tonsils are not injected.  CVS - RRR. Normal S1/S2. No murmur, click , rub or gallop  Lungs- clear to auscultation bilaterally  Abdomen - soft , not tender, no guarding, no rigidity. No hepatosplenomegaly. Normal bowel sounds. No masses and ascites. S/P Kidney transplant .  Transplanted kidney is not tender.   Musculoskeletal /Extremities - no edema. Full ROM. No joint tenderness.   Neuro/Psych - appropriate mood and affect. Motor power V/V all extremities. CN I -XII were grossly intact.  Skin - No visible rash    MEDICATION LIST: REVIEWED    [Held by provider] carvedilol, 6.25 mg, BID  cycloSPORINE, 100 mg, q12h MARTIN  desvenlafaxine succinate, 100 mg, Daily  furosemide, 40 mg, Once  levothyroxine, 50 mcg, q AM  lidocaine, 5 mL, Once  meropenem, 1 g, q12h  mycophenolate, 500 mg, BID  oxygen, , Continuous - Inhalation  polyethylene glycol, 17 g, Daily  predniSONE, 5 mg, Daily  sennosides-docusate sodium, 2 tablet, BID      dextrose 5%-0.45 % sodium chloride, Last Rate: 50 mL/hr (06/09/24 1120)  heparin, Last Rate: 1,100 Units/hr (06/09/24 0934)      alteplase, 2 mg, PRN  dextrose, 12.5 g, q15 min PRN  dextrose, 25 g, q15 min PRN  glucagon, 1 mg, q15 min " PRN  glucagon, 1 mg, q15 min PRN  sodium chloride, 1 spray, 4x daily PRN        ALLERGY:  Allergies   Allergen Reactions    Macrodantin [Nitrofurantoin Macrocrystal] GI Upset    Sulfa (Sulfonamide Antibiotics) Rash       LABS:  Results for orders placed or performed during the hospital encounter of 06/03/24 (from the past 24 hour(s))   POCT GLUCOSE   Result Value Ref Range    POCT Glucose 92 74 - 99 mg/dL   POCT GLUCOSE   Result Value Ref Range    POCT Glucose 83 74 - 99 mg/dL   Renal function panel   Result Value Ref Range    Glucose 126 (H) 74 - 99 mg/dL    Sodium 145 136 - 145 mmol/L    Potassium 4.2 3.5 - 5.3 mmol/L    Chloride 108 (H) 98 - 107 mmol/L    Bicarbonate 30 21 - 32 mmol/L    Anion Gap 11 10 - 20 mmol/L    Urea Nitrogen 33 (H) 6 - 23 mg/dL    Creatinine 1.60 (H) 0.50 - 1.05 mg/dL    eGFR 35 (L) >60 mL/min/1.73m*2    Calcium 7.5 (L) 8.6 - 10.6 mg/dL    Phosphorus 2.6 2.5 - 4.9 mg/dL    Albumin 2.4 (L) 3.4 - 5.0 g/dL   Magnesium   Result Value Ref Range    Magnesium 1.88 1.60 - 2.40 mg/dL   POCT GLUCOSE   Result Value Ref Range    POCT Glucose 70 (L) 74 - 99 mg/dL   POCT GLUCOSE   Result Value Ref Range    POCT Glucose 102 (H) 74 - 99 mg/dL   POCT GLUCOSE   Result Value Ref Range    POCT Glucose 103 (H) 74 - 99 mg/dL   CBC and Auto Differential   Result Value Ref Range    WBC 14.1 (H) 4.4 - 11.3 x10*3/uL    nRBC 0.0 0.0 - 0.0 /100 WBCs    RBC 3.07 (L) 4.00 - 5.20 x10*6/uL    Hemoglobin 10.8 (L) 12.0 - 16.0 g/dL    Hematocrit 36.1 36.0 - 46.0 %     (H) 80 - 100 fL    MCH 35.2 (H) 26.0 - 34.0 pg    MCHC 29.9 (L) 32.0 - 36.0 g/dL    RDW 16.9 (H) 11.5 - 14.5 %    Platelets 141 (L) 150 - 450 x10*3/uL    Neutrophils % 88.2 40.0 - 80.0 %    Immature Granulocytes %, Automated 1.1 (H) 0.0 - 0.9 %    Lymphocytes % 2.7 13.0 - 44.0 %    Monocytes % 6.3 2.0 - 10.0 %    Eosinophils % 1.4 0.0 - 6.0 %    Basophils % 0.3 0.0 - 2.0 %    Neutrophils Absolute 12.42 (H) 1.20 - 7.70 x10*3/uL    Immature Granulocytes  Absolute, Automated 0.15 0.00 - 0.70 x10*3/uL    Lymphocytes Absolute 0.38 (L) 1.20 - 4.80 x10*3/uL    Monocytes Absolute 0.89 0.10 - 1.00 x10*3/uL    Eosinophils Absolute 0.20 0.00 - 0.70 x10*3/uL    Basophils Absolute 0.04 0.00 - 0.10 x10*3/uL   Comprehensive metabolic panel   Result Value Ref Range    Glucose 112 (H) 74 - 99 mg/dL    Sodium 147 (H) 136 - 145 mmol/L    Potassium 4.5 3.5 - 5.3 mmol/L    Chloride 110 (H) 98 - 107 mmol/L    Bicarbonate 27 21 - 32 mmol/L    Anion Gap 15 10 - 20 mmol/L    Urea Nitrogen 33 (H) 6 - 23 mg/dL    Creatinine 1.52 (H) 0.50 - 1.05 mg/dL    eGFR 37 (L) >60 mL/min/1.73m*2    Calcium 7.4 (L) 8.6 - 10.6 mg/dL    Albumin 2.2 (L) 3.4 - 5.0 g/dL    Alkaline Phosphatase 194 (H) 33 - 136 U/L    Total Protein 4.1 (L) 6.4 - 8.2 g/dL    AST 97 (H) 9 - 39 U/L    Bilirubin, Total 0.8 0.0 - 1.2 mg/dL    ALT 47 (H) 7 - 45 U/L   Magnesium   Result Value Ref Range    Magnesium 2.32 1.60 - 2.40 mg/dL   Phosphorus   Result Value Ref Range    Phosphorus 2.4 (L) 2.5 - 4.9 mg/dL   Heparin Assay, UFH   Result Value Ref Range    Heparin Unfractionated 0.4 See Comment Below for Therapeutic Ranges IU/mL   POCT GLUCOSE   Result Value Ref Range    POCT Glucose 125 (H) 74 - 99 mg/dL   POCT GLUCOSE   Result Value Ref Range    POCT Glucose 107 (H) 74 - 99 mg/dL   POCT GLUCOSE   Result Value Ref Range    POCT Glucose 137 (H) 74 - 99 mg/dL        ASSESSMENT AND PLAN:    69 y.o. 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 Transferred to [facility] on 6/2. ID consulted for emphysematous pyelonephritis.     Transplant nephrology is  consulted to assist with immunosuppressive medication management and nephrology related issues.        1. LAURA S/P Kidney transplant. Due to UTI, ATN, pancreatitis    - Renal allograft function:   Lab Results   Component Value Date    CREATININE 1.52 (H) 06/09/2024     Estimated Creatinine Clearance: 38.7 mL/min (A) (by C-G formula based on SCr of 1.52 mg/dL (H)).    Intake/Output Summary (Last 24 hours) at 6/9/2024 1251  Last data filed at 6/9/2024 1230  Gross per 24 hour   Intake 1930.83 ml   Output 950 ml   Net 980.83 ml       -Echocardiogram with EF 40-45% and NORMAL RVSP.    - Continue to diuresis; goal net neg 1-2 L  - can increase Lasix 80 mg iv q 12 hours/ albumin  -Monitor K/Mg/phos. Replace prn.     - Continue to monitor UOP and Serum creatinine closely.   - Avoid nephrotoxic agents, NSAIDs and IV contrast   - Strict I/O.   - Renally dose all medications by the most recent CrCl from Cockcroft-Gault formula.    2. Immunosuppression   - continue current immunosuppression   - Monitor cyclosporine trough level before am dose is given. Target CSA trough level at 12 hour ~  - Continue cyclosporine at current dose. Level today is pending.  -MMF suspension 500 mg bid  - Prednisone 5 mg daily    3. Anemia and WBC   Lab Results   Component Value Date    WBC 14.1 (H) 06/09/2024    HGB 10.8 (L) 06/09/2024    HCT 36.1 06/09/2024     (H) 06/09/2024     (L) 06/09/2024     -Continue to monitor Hgb   -No indications for PRBC transfusion     4. Electrolyte   Lab Results   Component Value Date    GLUCOSE 112 (H) 06/09/2024    CALCIUM 7.4 (L) 06/09/2024     (H) 06/09/2024    K 4.5 06/09/2024    CO2 27 06/09/2024     (H) 06/09/2024    BUN 33 (H) 06/09/2024    CREATININE 1.52 (H) 06/09/2024     - Reviewed renal profile.   - Free H2O 250 ml q 6 hours    6. Hypertension   Blood Pressures         6/8/2024  1934 6/9/2024  0101 6/9/2024  0523 6/9/2024  0840 6/9/2024  1232    BP: 133/61 118/58 133/73  132/72 124/78          -Goal BP < 140/90 mmHg   -continue current management     7. UTI  Defer to ID  On meropenem    8.  GI prophylaxis   - On PPI     9. DVT Prophylaxis  -Defer to primary team    10. Pancreatitis  Lab Results   Component Value Date    AMYLASE 528 (H) 06/08/2024     Lab Results   Component Value Date    LIPASE 912 (H) 06/08/2024     - Less likely from hypercalcemia. Need to rule out other causes  -defer to primary team.    * Case was discussed with primary team.  For questions, please contact transplant nephrology page x [phone]    Deidra Robb    Transplant Nephrologist

## 2024-06-09 NOTE — PROCEDURES
12 F Iris Feeding Tube Placement    12 F feeding tube placed using IRIS technology into L nare at ~ 80cm. Tube secured with bridle. While initially attempting to place tube, pt began coughing, secretions visible in the area of the epiglottis (tube did not advance passed vocal cords). SpO2 dropped to 75%, at which point feeding tube was removed and O2 increased from 2L to 8L NC. RR RN encouraged pt to cough to clear secretions and SpO2 quickly improved to 92-97%. Once stable, feeding tube re-attempted with success. Bridle placed and secured with no s/s distress or bleeding. Pt remains congested. RR RN attempted to pass NT suction catheter down R nostril but met resistance, mild pressure applied but unable to pass. NT suction catheter easily passed down L nare and able to suction moderate amount of blood tinged secretions, noting bleeding coming from R nare. NT sxn aborted. MD notified, afrin requested and administered. Heparin held x 1 hour to allow for hemostasis. MD and RN aware. No s/s bleeding after interventions complete. O2 weaned during course of event to 3L NC, SpO2 96%. NGT stylet removed and bedside RN activated KUB. RN aware to wait to use tube until proper placement verified by MD. No additional interventions at current time. RN to continue to monitor SpO2. Dhaval Stoll RN.

## 2024-06-10 ENCOUNTER — APPOINTMENT (OUTPATIENT)
Dept: NEUROLOGY | Facility: HOSPITAL | Age: 69
End: 2024-06-10
Payer: MEDICARE

## 2024-06-10 ENCOUNTER — APPOINTMENT (OUTPATIENT)
Dept: RADIOLOGY | Facility: HOSPITAL | Age: 69
End: 2024-06-10
Payer: MEDICARE

## 2024-06-10 LAB
ALBUMIN SERPL BCP-MCNC: 2.6 G/DL (ref 3.4–5)
ALBUMIN SERPL BCP-MCNC: 2.6 G/DL (ref 3.4–5)
ALP SERPL-CCNC: 169 U/L (ref 33–136)
ALT SERPL W P-5'-P-CCNC: 43 U/L (ref 7–45)
ANION GAP SERPL CALC-SCNC: 12 MMOL/L (ref 10–20)
ANION GAP SERPL CALC-SCNC: 18 MMOL/L (ref 10–20)
AST SERPL W P-5'-P-CCNC: 100 U/L (ref 9–39)
BASOPHILS # BLD AUTO: 0.03 X10*3/UL (ref 0–0.1)
BASOPHILS NFR BLD AUTO: 0.2 %
BILIRUB SERPL-MCNC: 0.7 MG/DL (ref 0–1.2)
BUN SERPL-MCNC: 29 MG/DL (ref 6–23)
BUN SERPL-MCNC: 30 MG/DL (ref 6–23)
CA-I BLD-SCNC: 1.22 MMOL/L (ref 1.1–1.33)
CALCIUM SERPL-MCNC: 8.1 MG/DL (ref 8.6–10.6)
CALCIUM SERPL-MCNC: 8.2 MG/DL (ref 8.6–10.6)
CHLORIDE SERPL-SCNC: 104 MMOL/L (ref 98–107)
CHLORIDE SERPL-SCNC: 105 MMOL/L (ref 98–107)
CO2 SERPL-SCNC: 24 MMOL/L (ref 21–32)
CO2 SERPL-SCNC: 32 MMOL/L (ref 21–32)
CREAT SERPL-MCNC: 1.12 MG/DL (ref 0.5–1.05)
CREAT SERPL-MCNC: 1.25 MG/DL (ref 0.5–1.05)
CYCLOSPORINE BLD IA-MCNC: 173 NG/ML (ref 80–210)
EGFRCR SERPLBLD CKD-EPI 2021: 47 ML/MIN/1.73M*2
EGFRCR SERPLBLD CKD-EPI 2021: 54 ML/MIN/1.73M*2
EOSINOPHIL # BLD AUTO: 0.18 X10*3/UL (ref 0–0.7)
EOSINOPHIL NFR BLD AUTO: 1.4 %
ERYTHROCYTE [DISTWIDTH] IN BLOOD BY AUTOMATED COUNT: 16.4 % (ref 11.5–14.5)
ERYTHROCYTE [DISTWIDTH] IN BLOOD BY AUTOMATED COUNT: 16.5 % (ref 11.5–14.5)
GLUCOSE BLD MANUAL STRIP-MCNC: 129 MG/DL (ref 74–99)
GLUCOSE BLD MANUAL STRIP-MCNC: 143 MG/DL (ref 74–99)
GLUCOSE BLD MANUAL STRIP-MCNC: 167 MG/DL (ref 74–99)
GLUCOSE BLD MANUAL STRIP-MCNC: 179 MG/DL (ref 74–99)
GLUCOSE SERPL-MCNC: 195 MG/DL (ref 74–99)
GLUCOSE SERPL-MCNC: 214 MG/DL (ref 74–99)
HCT VFR BLD AUTO: 33 % (ref 36–46)
HCT VFR BLD AUTO: 35 % (ref 36–46)
HERPES SIMPLEX VIRUS 1 IGG: 6.5 INDEX
HERPES SIMPLEX VIRUS 2 IGG: >8 INDEX
HGB BLD-MCNC: 10.4 G/DL (ref 12–16)
HGB BLD-MCNC: 10.5 G/DL (ref 12–16)
IMM GRANULOCYTES # BLD AUTO: 0.12 X10*3/UL (ref 0–0.7)
IMM GRANULOCYTES NFR BLD AUTO: 0.9 % (ref 0–0.9)
LYMPHOCYTES # BLD AUTO: 0.49 X10*3/UL (ref 1.2–4.8)
LYMPHOCYTES NFR BLD AUTO: 3.8 %
MAGNESIUM SERPL-MCNC: 1.96 MG/DL (ref 1.6–2.4)
MCH RBC QN AUTO: 35 PG (ref 26–34)
MCH RBC QN AUTO: 35.7 PG (ref 26–34)
MCHC RBC AUTO-ENTMCNC: 29.7 G/DL (ref 32–36)
MCHC RBC AUTO-ENTMCNC: 31.8 G/DL (ref 32–36)
MCV RBC AUTO: 112 FL (ref 80–100)
MCV RBC AUTO: 118 FL (ref 80–100)
MONOCYTES # BLD AUTO: 0.72 X10*3/UL (ref 0.1–1)
MONOCYTES NFR BLD AUTO: 5.6 %
NEUTROPHILS # BLD AUTO: 11.22 X10*3/UL (ref 1.2–7.7)
NEUTROPHILS NFR BLD AUTO: 88.1 %
NRBC BLD-RTO: 0 /100 WBCS (ref 0–0)
NRBC BLD-RTO: 0 /100 WBCS (ref 0–0)
PHOSPHATE SERPL-MCNC: 1.8 MG/DL (ref 2.5–4.9)
PHOSPHATE SERPL-MCNC: 1.9 MG/DL (ref 2.5–4.9)
PLATELET # BLD AUTO: 131 X10*3/UL (ref 150–450)
PLATELET # BLD AUTO: 141 X10*3/UL (ref 150–450)
POTASSIUM SERPL-SCNC: 4 MMOL/L (ref 3.5–5.3)
POTASSIUM SERPL-SCNC: 4.7 MMOL/L (ref 3.5–5.3)
PROT SERPL-MCNC: 5 G/DL (ref 6.4–8.2)
RBC # BLD AUTO: 2.94 X10*6/UL (ref 4–5.2)
RBC # BLD AUTO: 2.97 X10*6/UL (ref 4–5.2)
SODIUM SERPL-SCNC: 143 MMOL/L (ref 136–145)
SODIUM SERPL-SCNC: 143 MMOL/L (ref 136–145)
UFH PPP CHRO-ACNC: 0.2 IU/ML
WBC # BLD AUTO: 12.1 X10*3/UL (ref 4.4–11.3)
WBC # BLD AUTO: 12.8 X10*3/UL (ref 4.4–11.3)

## 2024-06-10 PROCEDURE — 82330 ASSAY OF CALCIUM: CPT

## 2024-06-10 PROCEDURE — 99233 SBSQ HOSP IP/OBS HIGH 50: CPT | Performed by: INTERNAL MEDICINE

## 2024-06-10 PROCEDURE — 2500000004 HC RX 250 GENERAL PHARMACY W/ HCPCS (ALT 636 FOR OP/ED)

## 2024-06-10 PROCEDURE — 84100 ASSAY OF PHOSPHORUS: CPT | Mod: 91

## 2024-06-10 PROCEDURE — 2500000005 HC RX 250 GENERAL PHARMACY W/O HCPCS: Performed by: INTERNAL MEDICINE

## 2024-06-10 PROCEDURE — 80053 COMPREHEN METABOLIC PANEL: CPT

## 2024-06-10 PROCEDURE — 1200000002 HC GENERAL ROOM WITH TELEMETRY DAILY

## 2024-06-10 PROCEDURE — 2500000005 HC RX 250 GENERAL PHARMACY W/O HCPCS

## 2024-06-10 PROCEDURE — 2500000002 HC RX 250 W HCPCS SELF ADMINISTERED DRUGS (ALT 637 FOR MEDICARE OP, ALT 636 FOR OP/ED)

## 2024-06-10 PROCEDURE — 2500000001 HC RX 250 WO HCPCS SELF ADMINISTERED DRUGS (ALT 637 FOR MEDICARE OP)

## 2024-06-10 PROCEDURE — 85520 HEPARIN ASSAY: CPT

## 2024-06-10 PROCEDURE — 85027 COMPLETE CBC AUTOMATED: CPT

## 2024-06-10 PROCEDURE — 82947 ASSAY GLUCOSE BLOOD QUANT: CPT | Mod: 91

## 2024-06-10 PROCEDURE — 80069 RENAL FUNCTION PANEL: CPT | Mod: CCI

## 2024-06-10 PROCEDURE — 95816 EEG AWAKE AND DROWSY: CPT

## 2024-06-10 PROCEDURE — 99232 SBSQ HOSP IP/OBS MODERATE 35: CPT | Performed by: INTERNAL MEDICINE

## 2024-06-10 PROCEDURE — 83735 ASSAY OF MAGNESIUM: CPT

## 2024-06-10 PROCEDURE — 80158 DRUG ASSAY CYCLOSPORINE: CPT | Performed by: INTERNAL MEDICINE

## 2024-06-10 PROCEDURE — 87529 HSV DNA AMP PROBE: CPT

## 2024-06-10 PROCEDURE — A4217 STERILE WATER/SALINE, 500 ML: HCPCS

## 2024-06-10 PROCEDURE — 85025 COMPLETE CBC W/AUTO DIFF WBC: CPT

## 2024-06-10 PROCEDURE — 74230 X-RAY XM SWLNG FUNCJ C+: CPT | Performed by: RADIOLOGY

## 2024-06-10 PROCEDURE — 86695 HERPES SIMPLEX TYPE 1 TEST: CPT

## 2024-06-10 PROCEDURE — 95816 EEG AWAKE AND DROWSY: CPT | Performed by: PSYCHIATRY & NEUROLOGY

## 2024-06-10 PROCEDURE — 99233 SBSQ HOSP IP/OBS HIGH 50: CPT

## 2024-06-10 PROCEDURE — 74230 X-RAY XM SWLNG FUNCJ C+: CPT

## 2024-06-10 PROCEDURE — 92611 MOTION FLUOROSCOPY/SWALLOW: CPT | Mod: GN

## 2024-06-10 RX ORDER — FUROSEMIDE 10 MG/ML
40 INJECTION INTRAMUSCULAR; INTRAVENOUS EVERY 24 HOURS
Status: DISCONTINUED | OUTPATIENT
Start: 2024-06-10 | End: 2024-06-10

## 2024-06-10 RX ORDER — POTASSIUM CHLORIDE 20 MEQ/1
40 TABLET, EXTENDED RELEASE ORAL ONCE
Status: COMPLETED | OUTPATIENT
Start: 2024-06-10 | End: 2024-06-10

## 2024-06-10 RX ORDER — CYCLOSPORINE 100 MG/ML
75 SOLUTION ORAL
Status: DISCONTINUED | OUTPATIENT
Start: 2024-06-10 | End: 2024-06-15 | Stop reason: HOSPADM

## 2024-06-10 RX ORDER — FUROSEMIDE 10 MG/ML
40 INJECTION INTRAMUSCULAR; INTRAVENOUS ONCE
Status: COMPLETED | OUTPATIENT
Start: 2024-06-10 | End: 2024-06-10

## 2024-06-10 RX ORDER — SODIUM,POTASSIUM PHOSPHATES 280-250MG
1 POWDER IN PACKET (EA) ORAL 4 TIMES DAILY
Status: COMPLETED | OUTPATIENT
Start: 2024-06-10 | End: 2024-06-10

## 2024-06-10 RX ADMIN — HEPARIN SODIUM 1100 UNITS/HR: 10000 INJECTION, SOLUTION INTRAVENOUS at 09:38

## 2024-06-10 RX ADMIN — MYCOPHENOLATE MOFETIL 500 MG: 200 POWDER, FOR SUSPENSION ORAL at 06:30

## 2024-06-10 RX ADMIN — CYCLOSPORINE 75 MG: 100 SOLUTION ORAL at 17:58

## 2024-06-10 RX ADMIN — POLYETHYLENE GLYCOL 3350 17 G: 17 POWDER, FOR SOLUTION ORAL at 09:36

## 2024-06-10 RX ADMIN — POTASSIUM & SODIUM PHOSPHATES POWDER PACK 280-160-250 MG 1 PACKET: 280-160-250 PACK at 15:11

## 2024-06-10 RX ADMIN — SODIUM PHOSPHATE, MONOBASIC, MONOHYDRATE AND SODIUM PHOSPHATE, DIBASIC, ANHYDROUS 21 MMOL: 142; 276 INJECTION, SOLUTION INTRAVENOUS at 22:54

## 2024-06-10 RX ADMIN — MEROPENEM 1 G: 1 INJECTION, POWDER, FOR SOLUTION INTRAVENOUS at 12:00

## 2024-06-10 RX ADMIN — FUROSEMIDE 40 MG: 10 INJECTION, SOLUTION INTRAMUSCULAR; INTRAVENOUS at 00:59

## 2024-06-10 RX ADMIN — BARIUM SULFATE 30 ML: 400 PASTE ORAL at 09:00

## 2024-06-10 RX ADMIN — BARIUM SULFATE 40 ML: 0.81 POWDER, FOR SUSPENSION ORAL at 08:59

## 2024-06-10 RX ADMIN — CYCLOSPORINE 100 MG: 100 SOLUTION ORAL at 06:30

## 2024-06-10 RX ADMIN — POTASSIUM CHLORIDE 40 MEQ: 1500 TABLET, EXTENDED RELEASE ORAL at 00:59

## 2024-06-10 RX ADMIN — SENNOSIDES AND DOCUSATE SODIUM 2 TABLET: 50; 8.6 TABLET ORAL at 09:35

## 2024-06-10 RX ADMIN — SENNOSIDES AND DOCUSATE SODIUM 2 TABLET: 50; 8.6 TABLET ORAL at 21:08

## 2024-06-10 RX ADMIN — POTASSIUM & SODIUM PHOSPHATES POWDER PACK 280-160-250 MG 1 PACKET: 280-160-250 PACK at 09:35

## 2024-06-10 RX ADMIN — LEVOTHYROXINE SODIUM 50 MCG: 0.05 TABLET ORAL at 06:00

## 2024-06-10 RX ADMIN — MYCOPHENOLATE MOFETIL 500 MG: 200 POWDER, FOR SUSPENSION ORAL at 17:59

## 2024-06-10 RX ADMIN — Medication 4 L/MIN: at 08:00

## 2024-06-10 RX ADMIN — FUROSEMIDE 40 MG: 10 INJECTION, SOLUTION INTRAMUSCULAR; INTRAVENOUS at 09:49

## 2024-06-10 RX ADMIN — FUROSEMIDE 40 MG: 10 INJECTION, SOLUTION INTRAMUSCULAR; INTRAVENOUS at 21:08

## 2024-06-10 RX ADMIN — PREDNISONE 5 MG: 5 TABLET ORAL at 09:35

## 2024-06-10 RX ADMIN — DESVENLAFAXINE 100 MG: 100 TABLET, EXTENDED RELEASE ORAL at 09:35

## 2024-06-10 ASSESSMENT — COGNITIVE AND FUNCTIONAL STATUS - GENERAL
TURNING FROM BACK TO SIDE WHILE IN FLAT BAD: A LOT
MOVING TO AND FROM BED TO CHAIR: TOTAL
DRESSING REGULAR LOWER BODY CLOTHING: TOTAL
MOBILITY SCORE: 8
HELP NEEDED FOR BATHING: TOTAL
TURNING FROM BACK TO SIDE WHILE IN FLAT BAD: A LOT
STANDING UP FROM CHAIR USING ARMS: TOTAL
MOVING TO AND FROM BED TO CHAIR: TOTAL
DAILY ACTIVITIY SCORE: 8
CLIMB 3 TO 5 STEPS WITH RAILING: TOTAL
HELP NEEDED FOR BATHING: TOTAL
MOVING FROM LYING ON BACK TO SITTING ON SIDE OF FLAT BED WITH BEDRAILS: A LOT
PERSONAL GROOMING: A LOT
DRESSING REGULAR LOWER BODY CLOTHING: TOTAL
WALKING IN HOSPITAL ROOM: TOTAL
CLIMB 3 TO 5 STEPS WITH RAILING: TOTAL
STANDING UP FROM CHAIR USING ARMS: TOTAL
EATING MEALS: A LOT
MOBILITY SCORE: 8
PERSONAL GROOMING: A LOT
WALKING IN HOSPITAL ROOM: TOTAL
TOILETING: TOTAL
MOVING FROM LYING ON BACK TO SITTING ON SIDE OF FLAT BED WITH BEDRAILS: A LOT
EATING MEALS: A LOT
DRESSING REGULAR UPPER BODY CLOTHING: TOTAL
TOILETING: TOTAL
DAILY ACTIVITIY SCORE: 8
DRESSING REGULAR UPPER BODY CLOTHING: TOTAL

## 2024-06-10 ASSESSMENT — PAIN SCALES - GENERAL
PAINLEVEL_OUTOF10: 0 - NO PAIN

## 2024-06-10 ASSESSMENT — PAIN - FUNCTIONAL ASSESSMENT
PAIN_FUNCTIONAL_ASSESSMENT: 0-10
PAIN_FUNCTIONAL_ASSESSMENT: 0-10

## 2024-06-10 NOTE — PROGRESS NOTES
"Lea Paulino is a 69 y.o. female on day 7 of admission presenting with Pyelonephritis.    Subjective   NAEO. Ms. Abraham initially alert and sitting up in bed this morning, but quite somnolent during rounds. Remains oriented only to self. Unable to meaningfully participate in conversation during rounds       Objective     Physical Exam  Constitutional:       Appearance: She is ill-appearing.   HENT:      Head: Normocephalic and atraumatic.      Nose: Nose normal.      Mouth/Throat:      Mouth: Mucous membranes are moist.      Pharynx: Oropharynx is clear. No posterior oropharyngeal erythema.   Eyes:      Extraocular Movements: Extraocular movements intact.      Conjunctiva/sclera: Conjunctivae normal.      Pupils: Pupils are equal, round, and reactive to light.   Cardiovascular:      Rate and Rhythm: Normal rate and regular rhythm.      Pulses: Normal pulses.      Heart sounds: Normal heart sounds.   Pulmonary:      Effort: Pulmonary effort is normal. No respiratory distress.      Breath sounds: No wheezing or rhonchi.   Abdominal:      General: Abdomen is flat. There is no distension.      Palpations: Abdomen is soft.      Tenderness: There is no abdominal tenderness.   Musculoskeletal:         General: Normal range of motion.      Cervical back: Normal range of motion and neck supple.      Comments: Scant b/l LE edema. 1+ pitting edema of b/l upper extremities   Skin:     General: Skin is warm and dry.      Findings: No lesion or rash.   Neurological:      Comments: Oriented only to self. Persistent waxing and waning of alertness       Last Recorded Vitals  Blood pressure (!) 160/91, pulse 90, temperature 36.6 °C (97.9 °F), temperature source Temporal, resp. rate 18, height 1.575 m (5' 2.01\"), weight 97.8 kg (215 lb 9.8 oz), SpO2 96%.  Intake/Output last 3 Shifts:  I/O last 3 completed shifts:  In: 3459.9 (35.4 mL/kg) [I.V.:2264.9 (23.2 mL/kg); NG/GT:1195]  Out: 2925 (29.9 mL/kg) [Urine:2925 (0.8 " mL/kg/hr)]  Weight: 97.8 kg     Relevant Results  Results for orders placed or performed during the hospital encounter of 06/03/24 (from the past 24 hour(s))   POCT GLUCOSE   Result Value Ref Range    POCT Glucose 121 (H) 74 - 99 mg/dL   Renal function panel   Result Value Ref Range    Glucose 172 (H) 74 - 99 mg/dL    Sodium 143 136 - 145 mmol/L    Potassium 3.6 3.5 - 5.3 mmol/L    Chloride 106 98 - 107 mmol/L    Bicarbonate 30 21 - 32 mmol/L    Anion Gap 11 10 - 20 mmol/L    Urea Nitrogen 31 (H) 6 - 23 mg/dL    Creatinine 1.24 (H) 0.50 - 1.05 mg/dL    eGFR 48 (L) >60 mL/min/1.73m*2    Calcium 7.7 (L) 8.6 - 10.6 mg/dL    Phosphorus 1.6 (L) 2.5 - 4.9 mg/dL    Albumin 2.7 (L) 3.4 - 5.0 g/dL   Magnesium   Result Value Ref Range    Magnesium 2.08 1.60 - 2.40 mg/dL   POCT GLUCOSE   Result Value Ref Range    POCT Glucose 185 (H) 74 - 99 mg/dL   CBC   Result Value Ref Range    WBC 12.1 (H) 4.4 - 11.3 x10*3/uL    nRBC 0.0 0.0 - 0.0 /100 WBCs    RBC 2.94 (L) 4.00 - 5.20 x10*6/uL    Hemoglobin 10.5 (L) 12.0 - 16.0 g/dL    Hematocrit 33.0 (L) 36.0 - 46.0 %     (H) 80 - 100 fL    MCH 35.7 (H) 26.0 - 34.0 pg    MCHC 31.8 (L) 32.0 - 36.0 g/dL    RDW 16.4 (H) 11.5 - 14.5 %    Platelets 131 (L) 150 - 450 x10*3/uL   CBC and Auto Differential   Result Value Ref Range    WBC 12.8 (H) 4.4 - 11.3 x10*3/uL    nRBC 0.0 0.0 - 0.0 /100 WBCs    RBC 2.97 (L) 4.00 - 5.20 x10*6/uL    Hemoglobin 10.4 (L) 12.0 - 16.0 g/dL    Hematocrit 35.0 (L) 36.0 - 46.0 %     (H) 80 - 100 fL    MCH 35.0 (H) 26.0 - 34.0 pg    MCHC 29.7 (L) 32.0 - 36.0 g/dL    RDW 16.5 (H) 11.5 - 14.5 %    Platelets 141 (L) 150 - 450 x10*3/uL    Neutrophils % 88.1 40.0 - 80.0 %    Immature Granulocytes %, Automated 0.9 0.0 - 0.9 %    Lymphocytes % 3.8 13.0 - 44.0 %    Monocytes % 5.6 2.0 - 10.0 %    Eosinophils % 1.4 0.0 - 6.0 %    Basophils % 0.2 0.0 - 2.0 %    Neutrophils Absolute 11.22 (H) 1.20 - 7.70 x10*3/uL    Immature Granulocytes Absolute, Automated 0.12  0.00 - 0.70 x10*3/uL    Lymphocytes Absolute 0.49 (L) 1.20 - 4.80 x10*3/uL    Monocytes Absolute 0.72 0.10 - 1.00 x10*3/uL    Eosinophils Absolute 0.18 0.00 - 0.70 x10*3/uL    Basophils Absolute 0.03 0.00 - 0.10 x10*3/uL   Comprehensive metabolic panel   Result Value Ref Range    Glucose 195 (H) 74 - 99 mg/dL    Sodium 143 136 - 145 mmol/L    Potassium 4.0 3.5 - 5.3 mmol/L    Chloride 105 98 - 107 mmol/L    Bicarbonate 24 21 - 32 mmol/L    Anion Gap 18 10 - 20 mmol/L    Urea Nitrogen 30 (H) 6 - 23 mg/dL    Creatinine 1.25 (H) 0.50 - 1.05 mg/dL    eGFR 47 (L) >60 mL/min/1.73m*2    Calcium 8.2 (L) 8.6 - 10.6 mg/dL    Albumin 2.6 (L) 3.4 - 5.0 g/dL    Alkaline Phosphatase 169 (H) 33 - 136 U/L    Total Protein 5.0 (L) 6.4 - 8.2 g/dL     (H) 9 - 39 U/L    Bilirubin, Total 0.7 0.0 - 1.2 mg/dL    ALT 43 7 - 45 U/L   Phosphorus   Result Value Ref Range    Phosphorus 1.8 (L) 2.5 - 4.9 mg/dL   Magnesium   Result Value Ref Range    Magnesium 1.96 1.60 - 2.40 mg/dL   Cyclosporine level   Result Value Ref Range    Cyclosporine 173 80 - 210 ng/mL   HSV1 IgG and HSV2 IgG   Result Value Ref Range    HSV 1, IgG 6.5 (H) <0.9 INDEX    HSV 2, IgG >8.0 (H) <0.9 INDEX   POCT GLUCOSE   Result Value Ref Range    POCT Glucose 167 (H) 74 - 99 mg/dL   POCT GLUCOSE   Result Value Ref Range    POCT Glucose 129 (H) 74 - 99 mg/dL   Heparin Assay, UFH   Result Value Ref Range    Heparin Unfractionated 0.2 See Comment Below for Therapeutic Ranges IU/mL        This patient has a urinary catheter   Reason for the urinary catheter remaining today? critically ill patient who need accurate urinary output measurements       Assessment/Plan   Principal Problem:    Pyelonephritis    Lea Paulino is a 69 y.o. female with a history of COPD, renal transplant in 2015 on cyclosporine, mycophenolate and prednisone, HTN, HLD, hypothyroid, hyperparathyroid, HFmEF (45-50%), AFib, PVD, lymphedema, admitted for emphysematous pyelonephritis and acute  pancreatitis in the setting of severe hypercalcemia. Initially treated with IV fluids and abx. Transferred to MICU 6/4 for AHRF s/t pulmonary edema and intubated 6/4-6/5 for airway protection. Extubated 6/5, stable on 2.5L transferred back to floor. Transplant ID and nephrology following patient.     RR called 6/7 for lethargy and increased work of breathing, found to be hypoglycemic with worsening pulmonary edema on CXR. Started on D5 drip, airvo and diuresed with slight improvement in mentation and work of breathing. Endocrine consulted for persistent hypoglycemia, recommended starting tube feeds. DHT placed 6/8.     Updates 6/10:  -Continues to have persistent waxing/waning of mental status, despite antibiotic coverage and correction of hypoglycemia and hypernatremia.   -Given persistent AMS, will obtain MRI, spot EEG, and HSV PCR. Will hold Tfs 1 hour prior to imaging. Consider neuro consult pending imaging results  -Will decrease cyclosporine dosing to 75 mg BID per discussion with transplant  -Discussed case with ID, low concern for meningitis or encephalitis but will follow-up after MRI. Recommend additional 2 weeks of meropenem for pyelonephritis (through 6/24)  -Will repeat diuresis with IV Lasix 40 mg q12h, goal net negative 1L  -Hypoglycemia improved, will discontinue D5  -Hypernatremia improved to 143, will decrease free water flushes to 250 ml q6h  -Passed MBS this morning while more alert, cleared for soft/easy to chew foods and thin liquids. However, given ongoing waxing and waning, instructed nursing to not allow PO intake if patient is lethargic. Will continue Tfs for time being to ensure that patient is able to maintain blood glucose off of D5 drip    #Lethargy  #Acute encephalopathy   ::Suspected to be multifactorial in setting of hypoglycemia + hypernatremia+ delirium + infection +/- hypoxia  -CTH w/o acute ischemic or hemorrhage  -Remains persistently delirious despite correction of  hypoglycemia, hypoxia, hypernatremia, and antibiotic coverage  -Discussed with ID, low concern for meningitis or encephalitis at this time. May consider LP if neuro status does not improve and imaging is not revealing  -Will obtain MRI, spot EEG, and HSV PCR   -DHT placed 6/8, Tfs currently at goal. Passed MBS while more alert this morning but nursing instructed not to allow PO intake if altered. Will continue Tfs for time being while D5 is stopped and patient transitions to PO intake    #Emphysematous pyelonephritis  :: CT A/P with gas in the RLQ transplant kidney collecting system and in the urinary bladder, possible gas-forming infection  :: s/p vanc/zosyn and meropenem/gentamicin at OSH  :: previously grew pan-sensitive Pseudomonas (4/24) and E. faecalis (resistant to gentamicin, intermediate to doxycycline 1/22)  -Bcx 6/3 NGTD, repeat blood cx (6/7) NGD2  -Ucx 6/3 and 6/6 NGTD  -Transplant ID consulted, appreciate recs  -s/p vanc 6/3-6/8  -c/w meropenem 6/3-6/24 per ID recs  -consider urology c/s for nephrostomy tube if becomes hemodynamically unstable    #AHRF  :: CXR w/ right pleural effusion with adjacent atelectasis/consolidation and interval development of a small left pleural effusion with adjacent atelectasis  -on arivo overnight 6/7, weaned to 4L NC  -Repeat diuresis today with IV Lasix 40 mg q12h     #LAURA on CKD of transplanted kidney (2015), improving  :: Follows with Dr Gay at  for renal transplant, on mycophenolate, cyclosporine, prednisone  :: Creatinine on admission to [facility] 2.7 (baseline 1.5-1.7)  :: LAURA likely prerenal in setting of pancreatitis and hypovolemia  -Transplant nephrology following  -Continue prednisone 5 mg daily,  MMF  500 BID (home dose 1 g daily). Decreased cyclosporine to 75 mg BID  -CMV titer undetected, EBV detected, BK detected viral load 28  -Diuresis as per above, strict I/Os    #Hypercalcemia, resolved  #Hypocalcemia-resolved  :: ddx primary  hyperparathyroidism w/ remaining parathyroid tissue vs less likely MEN1 given possible recurrent, significant hypoglycemia concerning for insulinoma  :: s/p subtotal hemithyroidectomy & parathyroidectomy in 2006   :: Calcium 15.9 at [facility], treated with 4 units/kg calcitonin and IV NS but no bisphosphonate, repeat iCa subsequently fell below normal limits on 6/9. Now WNL (1.22) on repeat 6/10  - iPTH appropriately low on admission, repeat elevated at 204  -PTHrP, Vitamin D 25 and D-1,25 levels WNL, pending SPEP, UPEP   -Holding home calcitriol, continue to trend daily iCal, phos, and Mg  -Endocrine consulted, will touch base regarding cause of transient hypocalcemia/need for repletion    #Hypernatremia- improving  -147 (6/9)-> 143 (6/10)  -Decrease FWF to 250 ml q6h     #Acute pancreatitis, resolved  :: likely secondary to hypercalcemia (15.9)   :: Lipase 1039, CT findings supportive, low back pain initial presentation    - triglycerides obtained 82  - can repeat CT in a few weeks regarding fluid collections     #Reported nonsustained VT at [facility]  #Hypertension  #HFmrEF  - home metoprolol tartrate 12.5 BID switched to coreg 6.25 mg BID  - Optimize electrolytes K > 4, Mg > 2  - EKG 6/4 -> NSR, Left axis deviation  - Holding Bumex 1 mg for now  - Echo- EF 40-45%      #Bipolar Disorder  -Psych consulted   -resume home desvenlafaxine 100mg  -start home clozapine at 25 mg at night, starting 6/7 increase to home dose of 50 mg      #Afib  #Hx of DVT LLE 2022  -c/w coreg 6.25 mg BID  -holding home eliquis, on heparin gtt      #Hypothyroidism  - Continue levothyroxine     F: diuresing  E: replete prn, monitor Ca  N: regular  A: PIV  DVT ppx: heparin gtt for afib     Code status: full  NOK: Son Cesar- [phone]                 Belkis Paulino MD

## 2024-06-10 NOTE — PROGRESS NOTES
Lea Paulino is a 69 y.o. female on day 7 of admission presenting with Pyelonephritis.    Transitional Care Coordination Progress Note:  Patient discussed during interdisciplinary rounds.   Team members present: MD and TCC  Plan per Medical/Surgical team: Treating patient for Pancreatitis and Pyelonephritis. Now on TF, may start on IV Lasix. Getting MRI and EEG.  Payor: [employer].  Discharge disposition: SNF- Waiting for son (Cesar) to make choice.  Potential Barriers: None  ADOD: 06/17/24    VARGAS BARAJAS RN

## 2024-06-10 NOTE — PROGRESS NOTES
Social Work Note:    JANETW spoke with the patient's medical team on this date.  Patient getting MRI and EEG.  LISW called patient's son Cesar to discuss SNF at discharge.  LISW had to leave a message.  LISW will continue to follow.    ADD 2:44PM  LISW spoke with the patient's son on this date.  Patient was living at home alone.  Patient mainly walked with no DME but would sometimes use a walker if needed.  Patient would drive to local appts but family would assist for appointments farther away.  Patient was able to obtain and afford medications.  JANETW spoke with the patient's son about SNF.  JANETW explained that PT/OT saw the patient and recommended SNF.  Patient's son Cesar reported that the patient has been to [facility] and he would like her to go there at discharge.  LISW sent the referral.  LISW will continue to follow as needed.    CANDACE Urban, LISW-S

## 2024-06-10 NOTE — PROGRESS NOTES
Physical Therapy                 Therapy Communication Note    Patient Name: Lea Paulino  MRN: [id number]  Today's Date: 6/10/2024     Discipline: Physical Therapy    Missed Visit Reason: Missed Visit Reason: Other (Comment) (per DORA yarbrough today, pt not safe to participate in PT  today)    Missed Time: Attempt    Comment: 10:55am

## 2024-06-10 NOTE — SIGNIFICANT EVENT
Patient currently on tube feeds, D5 normal saline 50 mill per hour, no recurrent hypoglycemic episode.  She was found to have EBV and BK positive, pulmonary edema, worsening hypernatremia.    Very low suspicion for insulinoma.  No indication for workup as inpatient for now.  Endocrinology will sign off, kindly recontact us if needed.  Plan communicated with primary team via secure chat.  Case discussed with Dr. Rios.

## 2024-06-10 NOTE — PROGRESS NOTES
Lea Paulino is a 69 y.o. female on day 7 of admission presenting with Pyelonephritis.    Subjective   Interval History: Patient is lying in the bed.  Denies any problem but history is not reliable.    Review of Systems    Objective   Range of Vitals (last 24 hours)  Heart Rate:  [78-90]   Temp:  [36.2 °C (97.1 °F)-36.6 °C (97.9 °F)]   Resp:  [17-18]   BP: (121-178)/(75-91)   SpO2:  [93 %-100 %]   Daily Weight  06/06/24 : 97.8 kg (215 lb 9.8 oz)    Body mass index is 39.43 kg/m².    Physical Exam  GENERAL APPEARANCE: Awake and does not follow commands very well  HEENT: Atraumatic and normocephalic  CARDIAC: Regular   LUNGS: Clear  ABDOMEN: Soft and nontender      Antibiotics  dextrose injection  - Omnicell Override Pull  vancomycin (Vancocin) pharmacy to dose - pharmacy monitoring  vancomycin (Vancocin) in dextrose 5 % water (D5W) 500 mL IV 1,500 mg  meropenem (Merrem) in sodium chloride 0.9 % 100 mL IV 1 g  dextrose 50 % injection 25 g  meropenem (Merrem) in sodium chloride 0.9 % 100 mL IV 1 g  dextrose 5 % and lactated Ringer's bolus 1,000 mL  lactated Ringer's bolus 1,000 mL  sodium chloride 0.9% infusion  dextrose 50 % injection 25 g  magnesium sulfate IV 4 g  magnesium sulfate IV 2 g  D5 % and 0.9 % sodium chloride infusion  vancomycin (Vancocin) 1,000 mg in dextrose 5% water 200 mL  vancomycin (Vancocin) 1,000 mg in dextrose 5% water 200 mL  dextrose injection  - Omnicell Override Pull  dextrose 50 % injection 25 g  dextrose injection  - Omnicell Override Pull  predniSONE (Deltasone) tablet  folic acid (Folvite) tablet  metoprolol tartrate (Lopressor) tablet  vancomycin (Vancocin) capsule  allopurinol (Zyloprim) tablet  levothyroxine (Synthroid, Levoxyl) tablet    apixaban (Eliquis) tablet  bumetanide (Bumex) tablet  desvenlafaxine (Pristiq) 24 hr tablet  docusate sodium (Colace) capsule  simvastatin (Zocor) tablet  calcitriol (Rocaltrol) capsule  cloZAPine (Clozaril) tablet    cholecalciferol  (Vitamin D-3) tablet  cycloSPORINE (SandIMMUNE) capsule  mycophenolate (Cellcept) tablet  acyclovir (Zovirax) tablet    acyclovir (Zovirax) tablet 400 mg  apixaban (Eliquis) tablet 5 mg  bumetanide (Bumex) tablet 1 mg  allopurinol (Zyloprim) tablet 100 mg  calcitriol (Rocaltrol) capsule 0.5 mcg  cloZAPine (Clozaril) tablet 50 mg  cycloSPORINE (SandIMMUNE) capsule 100 mg  desvenlafaxine (Pristiq) 24 hr tablet 100 mg  levothyroxine (Synthroid, Levoxyl) tablet 50 mcg  predniSONE (Deltasone) tablet 5 mg  simvastatin (Zocor) tablet 40 mg  ipratropium-albuteroL (Duo-Neb) 0.5-2.5 mg/3 mL nebulizer solution 3 mL  polyethylene glycol (Glycolax, Miralax) packet 17 g  sennosides (Senokot) tablet 8.6 mg  mycophenolate (Cellcept) capsule 500 mg  heparin 25,000 Units in dextrose 5% 250 mL (100 Units/mL) infusion (premix)  heparin bolus from bag 3,000-6,000 Units  furosemide (Lasix) injection 40 mg  dextrose 50 % injection 25 g  glucagon (Glucagen) injection 1 mg  dextrose 50 % injection 25 g  glucagon (Glucagen) injection 1 mg  dextrose 50 % injection 12.5 g  potassium chloride 20 mEq in 100 mL IV premix  sennosides-docusate sodium (Padmaja-Colace) 8.6-50 mg per tablet 2 tablet  polyethylene glycol (Glycolax, Miralax) packet 17 g  heparin 25,000 Units in dextrose 5% 250 mL (100 Units/mL) infusion (premix)  perflutren lipid microspheres (Definity) injection 0.5-10 mL of dilution  sulfur hexafluoride microsphr (Lumason) injection 24.28 mg  perflutren protein A microsphere (Optison) injection 0.5 mL  midazolam (Versed) injection  - Omnicell Override Pull  fentaNYL PF (Sublimaze) injection  - Omnicell Override Pull  etomidate (Amidate) injection  - Omnicell Override Pull  fentaNYL infusion  - Omnicell Override Pull  dexmedeTOMIDine (Precedex) sodium chloride 0.9% infusion  - Omnicell Override Pull  rocuronium (ZeMuron) injection  - Omnicell Override Pull  cycloSPORINE (NEOral) microemulsion solution 100 mg  etomidate (Amidate) injection  20 mg  fentaNYL PF (Sublimaze) injection 100 mcg  midazolam (Versed) injection 4 mg  fentaNYL bolus from bag 25 mcg  fentaNYL bolus from bag 25 mcg  fentanyl (Sublimaze) 1000 mcg in sodium chloride 0.9% 100 mL (10 mcg/mL) infusion (premix)  propofol (Diprivan) infusion  rocuronium (ZeMuron) injection 97 mg  dexmedeTOMIDine (Precedex) 400 mcg in 100 mL (4 mcg/mL) sodium chloride 0.9% infusion  lactated Ringer's bolus 500 mL  oxygen (O2) therapy  sodium phosphate 15 mmol in sodium chloride 0.9% 250 mL IV  glucagon (Glucagen) injection 1 mg  dextrose 50 % injection 25 g  glucagon (Glucagen) injection 1 mg  dextrose 50 % injection 12.5 g  iohexol (OMNIPaque) 350 mg iodine/mL solution 75 mL  acyclovir (Zovirax) 500 mg in dextrose 5% 100 mL IV  lactated Ringer's bolus 500 mL  mycophenolate (Cellcept) suspension 500 mg  vancomycin in dextrose 5 % (Vancocin) IVPB 500 mg  metoprolol tartrate (Lopressor) tablet 12.5 mg  ipratropium-albuteroL (Duo-Neb) 0.5-2.5 mg/3 mL nebulizer solution 3 mL  desvenlafaxine (Pristiq) 24 hr tablet 100 mg  metoprolol tartrate (Lopressor) tablet 12.5 mg  cloZAPine (Clozaril) tablet 25 mg  carvedilol (Coreg) tablet 6.25 mg  cloZAPine (Clozaril) tablet 25 mg  vancomycin in dextrose 5 % (Vancocin) IVPB 500 mg  cloZAPine (Clozaril) tablet 50 mg  polyethylene glycol (Glycolax, Miralax) packet 17 g  glucagon (Glucagen) injection 1 mg  dextrose 50 % injection 25 g  glucagon (Glucagen) injection 1 mg  dextrose 50 % injection 12.5 g  heparin 25,000 Units in dextrose 5% 250 mL (100 Units/mL) infusion (premix)  vancomycin (Vancocin) pharmacy to dose - pharmacy monitoring  mycophenolate (Cellcept) suspension 500 mg  furosemide (Lasix) injection 40 mg  dextrose 50 % injection 25 g  micafungin (Mycamine) 100 mg in dextrose 5% 100 mL IV  D5 % and 0.9 % sodium chloride infusion  lidocaine (Xylocaine) 10 mg/mL (1 %) injection 50 mg  alteplase (Cathflo Activase) injection 2 mg  vancomycin in dextrose 5 %  (Vancocin) IVPB 500 mg  furosemide (Lasix) injection 40 mg  oxygen (O2) therapy  vancomycin in dextrose 5 % (Vancocin) IVPB 500 mg  predniSONE (Deltasone) tablet 5 mg  dextrose 5%-0.45 % sodium chloride infusion  furosemide (Lasix) tablet 40 mg  magnesium sulfate IV 2 g  sodium chloride (Ocean) 0.65 % nasal spray 1 spray  oxymetazoline (Afrin) nasal spray  - Omnicell Override Pull  albumin human 5 % infusion 25 g  furosemide (Lasix) injection 80 mg  potassium chloride CR (Klor-Con M20) ER tablet 40 mEq  furosemide (Lasix) injection 40 mg  potassium, sodium phosphates (Phos-NaK) 280-160-250 mg packet 1 packet  barium sulfate (Varibar THIN Liquid) 81 % (w/w) suspension 40 mL  barium sulfate (Varibar Pudding) 40 % (w/v), 30% (w/w) oral paste 30 mL  furosemide (Lasix) injection 40 mg  furosemide (Lasix) injection 40 mg  cycloSPORINE (NEOral) microemulsion solution 75 mg      Relevant Results  Labs  Results from last 72 hours   Lab Units 06/10/24  0518 06/10/24  0005 06/09/24  0601 06/08/24  0530   WBC AUTO x10*3/uL 12.8* 12.1* 14.1* 12.4*   HEMOGLOBIN g/dL 10.4* 10.5* 10.8* 10.5*   HEMATOCRIT % 35.0* 33.0* 36.1 35.1*   PLATELETS AUTO x10*3/uL 141* 131* 141* 119*   NEUTROS PCT AUTO % 88.1  --  88.2 87.2   LYMPHS PCT AUTO % 3.8  --  2.7 3.3   MONOS PCT AUTO % 5.6  --  6.3 6.5   EOS PCT AUTO % 1.4  --  1.4 1.7     Results from last 72 hours   Lab Units 06/10/24  0518 06/09/24  2018 06/09/24  0601   SODIUM mmol/L 143 143 147*   POTASSIUM mmol/L 4.0 3.6 4.5   CHLORIDE mmol/L 105 106 110*   CO2 mmol/L 24 30 27   BUN mg/dL 30* 31* 33*   CREATININE mg/dL 1.25* 1.24* 1.52*   GLUCOSE mg/dL 195* 172* 112*   CALCIUM mg/dL 8.2* 7.7* 7.4*   ANION GAP mmol/L 18 11 15   EGFR mL/min/1.73m*2 47* 48* 37*   PHOSPHORUS mg/dL 1.8* 1.6* 2.4*     Results from last 72 hours   Lab Units 06/10/24  0518 06/09/24 2018 06/09/24  0601 06/08/24  1717 06/08/24  0530   ALK PHOS U/L 169*  --  194*  --  158*   BILIRUBIN TOTAL mg/dL 0.7  --  0.8  --  0.8  "  PROTEIN TOTAL g/dL 5.0*  --  4.1*  --  4.7*   ALT U/L 43  --  47*  --  51*   AST U/L 100*  --  97*  --  88*   ALBUMIN g/dL 2.6* 2.7* 2.2*   < > 2.4*    < > = values in this interval not displayed.     Estimated Creatinine Clearance: 47.1 mL/min (A) (by C-G formula based on SCr of 1.25 mg/dL (H)).  No results found for: \"CRP\"  Microbiology    Imaging              Assessment/Plan     69 y.o. female with past medical history of renal transplant-in 2015, on cyclosporine, MMF, prednisone, hypertension, hyperlipidemia, COPD, CHF, A-fib, peripheral arterial disease, lymphedema was transferred from outside hospital for concerns of emphysematous pyelonephritis of the transplanted kidney and tiny right transplant non obstructing renal stone along with acute pancreatitis. Transferred to [facility] on 6/2. ID consulted for emphysematous pyelonephritis. Patient was seen by ID at [facility] and was recommended to continue Meropenem and Vancomycin. Most likely microbiology for emphysematous UTI's are gram negative microorganisms such as Klebsiella and E Coli. For now will treat her empirically with broad spectrum abx Meropenem (for possible resistant organisms).      Plan  Continue meropenem IV (renally dosed) for 2 weeks.  If patient is discharged prior to completing antibiotic therapy, will transition to oral regimen.  ID will sign off.       Ray Bailey MD  "

## 2024-06-10 NOTE — CARE PLAN
The patient's goals for the shift include      The clinical goals for the shift include pt BG will remain WNL.

## 2024-06-10 NOTE — CONSULTS
"Nutrition Note:   Nutrition Assessment    Reason for Assessment: Provider consult order (Malnutrition, TF)    Patient is a 69 y.o. female presenting with Pyelonephritis.     History of COPD, renal transplant in 2015 on cyclosporine, mycophenolate and prednisone, HTN, HLD, hypothyroid, hyperparathyroid, HFmEF (45-50%), AFib, PVD, lymphedema     Nutrition History:  Food and Nutrient History: Patient was lying in bed upon visit this morning.  Osmolite 1.5 infusing via DHT @ 45ml/hr= 1620kcal, 68g protein and 823ml H20/d.  Water flushes are ordered @ 250ml every 6hrs= 1000ml H20/d.  Patient passed MBSS this morning for bite sized foods and regular/thin liquids.  Patient reports prior to admit she ate/tolerated a regular diet.  She is not feeling very hungry today. Patient denies nutritional concerns re poor intake or weight loss prior to admit.  Over hospital stay initally was NPO then required tube feeds.  Food Allergies/Intolerances:  None    Anthropometrics:  Height: 157.5 cm (5' 2.01\")   Weight: 97.8 kg (215 lb 9.8 oz)   BMI (Calculated): 39.43  IBW/kg (Dietitian Calculated): 50 kg  Percent of IBW: 196 %  Adjusted Body Weight (kg): 62 kg    Weight History:   Wt Readings from Last 7 Encounters:   06/06/24 97.8 kg (215 lb 9.8 oz)   06/02/24 97 kg (213 lb 13.5 oz)   04/10/24 95.4 kg (210 lb 4.8 oz)   09/28/23 93.9 kg (207 lb)   03/22/23 93 kg (205 lb)   08/31/22 88.4 kg (194 lb 12.8 oz)   05/05/21 102 kg (225 lb 12.8 oz)       Weight Change %:  Weight History / % Weight Change: 6/3/24) 96.9kg, 6/6/24) 97.8kg    Nutrition Focused Physical Exam Findings:    Subcutaneous Fat Loss:   Orbital Fat Pads: Well nourished (slightly bulging fat pads)  Buccal Fat Pads: Well nourished (full, rounded cheeks)  Triceps: Well nourished (ample fat tissue)  Ribs: Defer  Muscle Wasting:  Temporalis: Well nourished (well-defined muscle)  Pectoralis (Clavicular Region): Well nourished (clavicle not visible)  Deltoid/Trapezius: Well " nourished (rounded appearance at arm, shoulder, neck)  Interosseous: Well nourished (muscle bulges)  Trapezius/Infraspinatus/Supraspinatus (Scapular Region): Defer  Quadriceps: Well nourished (well developed, well rounded)  Gastrocnemius: Well nourished (well developed bulbous muscle)  Edema:  Edema Location: 3 upper extremities, 1 lower extremities  Physical Findings:  Skin: Positive (ecchymosis)    Nutrition Significant Labs:  CBC Trend:   Results from last 7 days   Lab Units 06/10/24  0518 06/10/24  0005 06/09/24  0601 06/08/24  0530   WBC AUTO x10*3/uL 12.8* 12.1* 14.1* 12.4*   RBC AUTO x10*6/uL 2.97* 2.94* 3.07* 3.03*   HEMOGLOBIN g/dL 10.4* 10.5* 10.8* 10.5*   HEMATOCRIT % 35.0* 33.0* 36.1 35.1*   MCV fL 118* 112* 118* 116*   PLATELETS AUTO x10*3/uL 141* 131* 141* 119*    , Renal Lab Trend:   Results from last 7 days   Lab Units 06/10/24  0518 06/09/24  2018 06/09/24  0601 06/08/24  1717   POTASSIUM mmol/L 4.0 3.6 4.5 4.2   PHOSPHORUS mg/dL 1.8* 1.6* 2.4* 2.6   SODIUM mmol/L 143 143 147* 145   MAGNESIUM mg/dL 1.96 2.08 2.32 1.88   EGFR mL/min/1.73m*2 47* 48* 37* 35*   BUN mg/dL 30* 31* 33* 33*   CREATININE mg/dL 1.25* 1.24* 1.52* 1.60*      Lab Results   Component Value Date    CALCIUM 8.2 (L) 06/10/2024    PHOS 1.8 (L) 06/10/2024       Nutrition Specific Medications:  Scheduled medications  [Held by provider] carvedilol, 6.25 mg, oral, BID  cycloSPORINE, 75 mg, oral, q12h MARTIN  desvenlafaxine succinate, 100 mg, oral, Daily  furosemide, 40 mg, intravenous, Once  levothyroxine, 50 mcg, oral, q AM  lidocaine, 5 mL, infiltration, Once  meropenem, 1 g, intravenous, q12h  mycophenolate, 500 mg, oral, BID  oxygen, , inhalation, Continuous - Inhalation  polyethylene glycol, 17 g, oral, Daily  potassium, sodium phosphates, 1 packet, oral, 4x daily  predniSONE, 5 mg, oral, Daily  sennosides-docusate sodium, 2 tablet, oral, BID      Continuous medications  heparin, 0-4,500 Units/hr, Last Rate: 1,100 Units/hr (06/10/24  0938)      I/O:   Last BM Date: 06/09/24; Stool Appearance: Hard (06/09/24 1631)    Dietary Orders (From admission, onward)       Start     Ordered    06/10/24 1106  Enteral feeding WITH diet order Diet type: Regular; Texture: Bite size food 6; Fluid consistency: Thin 0; Tube feeding formula: Osmolite 1.5; 0; Increase by 10 every 8 hours until goal rate is achieved; 45; 250; Water; Every 6 hours  Continuous        Question Answer Comment   Diet type Regular    Texture Bite size food 6    Fluid consistency Thin 0    Tube feeding formula: Osmolite 1.5    Feeding route: NG (nasogastric tube)    Tube feeding bolus (mL): 0    Tube feeding bolus frequency: Increase by 10 every 8 hours until goal rate is achieved    Tube feeding continuous rate (mL/hr): 45    Tube feeding flush (mL): 250    Flush type: Water    Flush frequency: Every 6 hours        06/10/24 1106                     Estimated Needs:   Total Energy Estimated Needs (kCal): 1600 kCal  Method for Estimating Needs: 32kcal/kg  Total Protein Estimated Needs (g): 70 g  Method for Estimating Needs: 1.4g/kg              Nutrition Diagnosis        Nutrition Diagnosis  Patient has Nutrition Diagnosis: Yes  Diagnosis Status (1): Ongoing (improving)  Nutrition Diagnosis 1: Inadequate protein energy intake  Related to (1): respiratory failure with need for Bipap/vent  As Evidenced by (1): NPO for 2 days this admit and may have been NPO since 6/1 at OSH       Nutrition Interventions/Recommendations         Nutrition Prescription:  Individualized Nutrition Prescription Provided for :     Recommend:  1) Oral diet and fluids per SLP     2) Continue phosphorus repletion.     3) For enteral nutrition can continue Osmolite 1.5 formula while patient works to increase oral intake.  Could provide an 8hr window off tube feeds by infusing TF @ 50ml/hr x 16hrs to provide ~75% estimated needs (1200kcal, 50g protein, 610ml H20).  Additional fluids per provider discretion.     4) Ensure  plus TID (ordered).        Nutrition Interventions:   Medical Food Supplement: Commercial beverage  Goal: Ensure plus = 350kcal, 13g pro per each         Nutrition Education:      Nutrition plan discussed with patient.    Encouraged intake as tolerated to wean Tf as able.     Nutrition Monitoring and Evaluation   Food/Nutrient Related History Monitoring  Monitoring and Evaluation Plan: Energy intake  Criteria: Daily improvement in oral intake         Biochemical Data, Medical Tests and Procedures  Monitoring and Evaluation Plan: Electrolyte/renal panel  Criteria: wnl            Time Spent/Follow-up Reminder:   Time Spent (min): 60 minutes  Last Date of Nutrition Visit: 06/10/24  Nutrition Follow-Up Needed?: Dietitian to reassess per policy  Follow up Comment: TF/Diet/ONS

## 2024-06-10 NOTE — CARE PLAN
The patient's goals for the shift include      The clinical goals for the shift include Patient blood will be normaglycemic during my shift    Patient is alert to self. Patient had a EEG and barium swallow eval today. Diet changed to a reg diet with assist with feedings and to continue with TF.  MRI form completed. Patient was lethargy this shift, responds to name and pain stimuli. BP'S elevated, MD made aware.     Problem: Pain  Goal: My pain/discomfort is manageable  Outcome: Progressing     Problem: Safety  Goal: Patient will be injury free during hospitalization  Outcome: Progressing  Goal: I will remain free of falls  Outcome: Progressing     Problem: Daily Care  Goal: Daily care needs are met  Outcome: Progressing     Problem: Psychosocial Needs  Goal: Demonstrates ability to cope with hospitalization/illness  Outcome: Progressing  Goal: Collaborate with me, my family, and caregiver to identify my specific goals  Outcome: Progressing     Problem: Discharge Barriers  Goal: My discharge needs are met  Outcome: Progressing     Problem: Skin  Goal: Participates in plan/prevention/treatment measures  Outcome: Progressing  Goal: Prevent/manage excess moisture  Outcome: Progressing  Goal: Prevent/minimize sheer/friction injuries  Outcome: Progressing  Goal: Promote/optimize nutrition  Outcome: Progressing  Goal: Promote skin healing  Outcome: Progressing     Problem: Respiratory  Goal: Clear secretions with interventions this shift  Outcome: Progressing  Goal: Minimize anxiety/maximize coping throughout shift  Outcome: Progressing  Goal: Minimal/no exertional discomfort or dyspnea this shift  Outcome: Progressing  Goal: No signs of respiratory distress (eg. Use of accessory muscles. Peds grunting)  Outcome: Progressing  Goal: Patent airway maintained this shift  Outcome: Progressing  Goal: Tolerate mechanical ventilation evidenced by VS/agitation level this shift  Outcome: Progressing  Goal: Tolerate pulmonary  toileting this shift  Outcome: Progressing  Goal: Verbalize decreased shortness of breath this shift  Outcome: Progressing  Goal: Wean oxygen to maintain O2 saturation per order/standard this shift  Outcome: Progressing  Goal: Increase self care and/or family involvement in next 24 hours  Outcome: Progressing

## 2024-06-10 NOTE — PROCEDURES
Speech-Language Pathology  Adult Inpatient Modified Barium Swallow Study (MBSS)    Patient Name: Lea Paulino  MRN: [id number]  Today's Date: 6/10/2024   Start Time: 800  Stop Time: 830  Time Calculation (min): 30    Initial MBSS: Yes    Respiratory Status:  4L nasal cannula, intermittently dyspneic w/ speech    History of Present Illness:   Lea Paulino is a 69 y.o. female with a history of COPD, renal transplant in 2015 on cyclosporine, mycophenolate and prednisone, HTN, HLD, hypothyroid, hyperparathyroid, HFmEF (45-50%), AFib, PVD, lymphedema, 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, presenting to the MICU in acute respiratory distress placed on BiPAP. Initially patient considered to an outside ED on June 1 which presented with epigastric pain and dysuria.  At the time patient seen to have emphysematous pyelonephritis and acute pancreatitis.  Her workup at the time showed an elevated creatinine of 2.71 baseline is about 1.5-1.7 as well as hypercalcemia of 15.4.  She had a CT abdomen pelvis which showed pancreatic inflammation as well as gas in the collecting system and urinary bladder which was concerning for infection with gas-forming organism.  Patient was made n.p.o. and started on IV fluids.  Patient was started on vancomycin and Zosyn and then transition to meropenem and gentamicin.  Patient had episodes of nonsustained V. tach for which cardiology was consulted and recommended increasing beta-blocker and repeating an echo.  Per nephrology patient was recommended IV resuscitation as well as current immunosuppressive regimen and hypercalcemia workup.  Due to her transplant history of renal transplant 2015 patient was transferred to [facility] for further care.  Patient was admitted to [facility] where she was seen to be altered and was found to have a glucose of 37 in which she was given 2 A of D50.  Patient began to be in acute respiratory distress with a VBG  showing a pH of 7.23, CO2 66 and a lactate of 1.  Patient was placed on BiPAP and was admitted to the ICU.  X-ray chest done yesterday 6/3 showed right-sided pleural effusion with associated atelectasis/consolidation.     On arrival to the ICU patient was tolerating BiPAP.  Patient was arousable.  Repeat VBG 7.20/73. Due to expected clinical course patient was intubated. Suspicion for Meningitis in setting of AMS and recent epidural injection, CT head and spine obtained with possibility of LP. CT C/A/P ordered as well for suspicion of worsening infection or stool burden causing AMS/ respiratory distress.     Impression:   Modified Barium Swallow Study completed. Verbal consent obtained. Pt awake/alert and cooperative/consistently able to follow commands throughout study. Of note, pt w/ frequent belching during PO trials.    Oropharyngeal swallow function WNL. Trials of thin liquids via tsp/straw, purees, and regular solids were given. Mildly prolonged but complete mastication of regular solids w/ piecemeal swallow. Smaller bites of regular solids + liquid wash consistently successful in achieving oral clearance.     Trace transient penetration w/ consecutive sips thin liquids via straw 2/2 slight mistiming of laryngeal vestibular closure; penetration consistently cleared w/ remainder of swallow. No penetration/aspiration w/ purees or regular solids and consistent pharyngeal clearance achieved w/ all consistencies 2/2 independently initiated volitional swallows.     Esophageal sweep attempted; poor visualization 2/2 pt's body habitus.    Recommend initiation of Soft/Bite-Sized (Level 6) Solids & Thin Liquids. Ensure all safe swallowing guidelines are followed during oral intake. SLP to continue to follow to ensure diet tolerance as pt appropriate/schedule permits. If pt presents with any changes to mental, medical, or respiratory status, please make NPO and alert SLP. MD aware.     Adam:  Thin: 2 - Material  enters airway, remains above the folds, ejected from airway.  Puree: 1 - Material does not enter airway.   Solids: 1 - Material does not enter airway.     Recommendations:  Soft/Bite-Sized (Level 6) Solids & Thin Liquids  Upright for all PO intake  Remain upright for 20-30 min after eating  Small bites/sips  Straws ok  Slow rate of consumption  Pills per pt preference  SLP to continue to follow to ensure diet tolerance as pt appropriate/schedule permits  If pt presents with any changes to mental, medical, or respiratory status, please make NPO and alert SLP    Goal:   Pt will tolerate least restrictive diet with no overt clinical s/s aspiration 100% of the time.        Plan:  SLP Services Indicated: Yes  Frequency: 2x week  Discussed POC with patient  SLP - OK to Discharge    Pain:   0-10  0 = No pain.     Inpatient Education:  Extensive education provided to patient regarding current swallow function, recommendations/results, and POC.      Consultations/Referrals/Coordination of Services:   N/A

## 2024-06-11 ENCOUNTER — APPOINTMENT (OUTPATIENT)
Dept: RADIOLOGY | Facility: HOSPITAL | Age: 69
End: 2024-06-11
Payer: MEDICARE

## 2024-06-11 LAB
ALBUMIN SERPL BCP-MCNC: 2.5 G/DL (ref 3.4–5)
ALBUMIN SERPL BCP-MCNC: 2.6 G/DL (ref 3.4–5)
ALP SERPL-CCNC: 145 U/L (ref 33–136)
ALT SERPL W P-5'-P-CCNC: 40 U/L (ref 7–45)
ANION GAP SERPL CALC-SCNC: 11 MMOL/L (ref 10–20)
ANION GAP SERPL CALC-SCNC: 12 MMOL/L (ref 10–20)
AST SERPL W P-5'-P-CCNC: 59 U/L (ref 9–39)
ATRIAL RATE: 96 BPM
BACTERIA BLD CULT: NORMAL
BASOPHILS # BLD AUTO: 0.02 X10*3/UL (ref 0–0.1)
BASOPHILS NFR BLD AUTO: 0.2 %
BILIRUB SERPL-MCNC: 0.7 MG/DL (ref 0–1.2)
BUN SERPL-MCNC: 27 MG/DL (ref 6–23)
BUN SERPL-MCNC: 29 MG/DL (ref 6–23)
CA-I BLD-SCNC: 1.2 MMOL/L (ref 1.1–1.33)
CALCIUM SERPL-MCNC: 8.7 MG/DL (ref 8.6–10.6)
CALCIUM SERPL-MCNC: 8.7 MG/DL (ref 8.6–10.6)
CHLORIDE SERPL-SCNC: 102 MMOL/L (ref 98–107)
CHLORIDE SERPL-SCNC: 105 MMOL/L (ref 98–107)
CO2 SERPL-SCNC: 35 MMOL/L (ref 21–32)
CO2 SERPL-SCNC: 39 MMOL/L (ref 21–32)
CREAT SERPL-MCNC: 1.01 MG/DL (ref 0.5–1.05)
CREAT SERPL-MCNC: 1.05 MG/DL (ref 0.5–1.05)
CYCLOSPORINE BLD IA-MCNC: 126 NG/ML (ref 80–210)
EGFRCR SERPLBLD CKD-EPI 2021: 58 ML/MIN/1.73M*2
EGFRCR SERPLBLD CKD-EPI 2021: 61 ML/MIN/1.73M*2
EOSINOPHIL # BLD AUTO: 0.2 X10*3/UL (ref 0–0.7)
EOSINOPHIL NFR BLD AUTO: 1.5 %
ERYTHROCYTE [DISTWIDTH] IN BLOOD BY AUTOMATED COUNT: 16.4 % (ref 11.5–14.5)
GLUCOSE BLD MANUAL STRIP-MCNC: 143 MG/DL (ref 74–99)
GLUCOSE BLD MANUAL STRIP-MCNC: 154 MG/DL (ref 74–99)
GLUCOSE BLD MANUAL STRIP-MCNC: 166 MG/DL (ref 74–99)
GLUCOSE BLD MANUAL STRIP-MCNC: 171 MG/DL (ref 74–99)
GLUCOSE BLD MANUAL STRIP-MCNC: 172 MG/DL (ref 74–99)
GLUCOSE BLD MANUAL STRIP-MCNC: 174 MG/DL (ref 74–99)
GLUCOSE BLD MANUAL STRIP-MCNC: 176 MG/DL (ref 74–99)
GLUCOSE SERPL-MCNC: 164 MG/DL (ref 74–99)
GLUCOSE SERPL-MCNC: 176 MG/DL (ref 74–99)
HCT VFR BLD AUTO: 35.7 % (ref 36–46)
HGB BLD-MCNC: 10.8 G/DL (ref 12–16)
HSV1 DNA BLD QL NAA+PROBE: NOT DETECTED
HSV2 DNA BLD QL NAA+PROBE: NOT DETECTED
IMM GRANULOCYTES # BLD AUTO: 0.11 X10*3/UL (ref 0–0.7)
IMM GRANULOCYTES NFR BLD AUTO: 0.8 % (ref 0–0.9)
LYMPHOCYTES # BLD AUTO: 0.57 X10*3/UL (ref 1.2–4.8)
LYMPHOCYTES NFR BLD AUTO: 4.3 %
MAGNESIUM SERPL-MCNC: 1.73 MG/DL (ref 1.6–2.4)
MCH RBC QN AUTO: 34.7 PG (ref 26–34)
MCHC RBC AUTO-ENTMCNC: 30.3 G/DL (ref 32–36)
MCV RBC AUTO: 115 FL (ref 80–100)
MONOCYTES # BLD AUTO: 0.83 X10*3/UL (ref 0.1–1)
MONOCYTES NFR BLD AUTO: 6.2 %
NEUTROPHILS # BLD AUTO: 11.56 X10*3/UL (ref 1.2–7.7)
NEUTROPHILS NFR BLD AUTO: 87 %
NRBC BLD-RTO: 0 /100 WBCS (ref 0–0)
P AXIS: 51 DEGREES
P OFFSET: 192 MS
P ONSET: 122 MS
PHOSPHATE SERPL-MCNC: 3.3 MG/DL (ref 2.5–4.9)
PHOSPHATE SERPL-MCNC: 3.6 MG/DL (ref 2.5–4.9)
PLATELET # BLD AUTO: 148 X10*3/UL (ref 150–450)
POTASSIUM SERPL-SCNC: 4.3 MMOL/L (ref 3.5–5.3)
POTASSIUM SERPL-SCNC: 4.7 MMOL/L (ref 3.5–5.3)
PR INTERVAL: 156 MS
PROT SERPL-MCNC: 5.2 G/DL (ref 6.4–8.2)
Q ONSET: 200 MS
QRS COUNT: 15 BEATS
QRS DURATION: 140 MS
QT INTERVAL: 372 MS
QTC CALCULATION(BAZETT): 469 MS
QTC FREDERICIA: 435 MS
R AXIS: -56 DEGREES
RBC # BLD AUTO: 3.11 X10*6/UL (ref 4–5.2)
SODIUM SERPL-SCNC: 147 MMOL/L (ref 136–145)
SODIUM SERPL-SCNC: 148 MMOL/L (ref 136–145)
T AXIS: 115 DEGREES
T OFFSET: 386 MS
UFH PPP CHRO-ACNC: 0.2 IU/ML
UFH PPP CHRO-ACNC: 0.3 IU/ML
VENTRICULAR RATE: 96 BPM
WBC # BLD AUTO: 13.3 X10*3/UL (ref 4.4–11.3)

## 2024-06-11 PROCEDURE — 2500000004 HC RX 250 GENERAL PHARMACY W/ HCPCS (ALT 636 FOR OP/ED)

## 2024-06-11 PROCEDURE — 70553 MRI BRAIN STEM W/O & W/DYE: CPT

## 2024-06-11 PROCEDURE — 2550000001 HC RX 255 CONTRASTS: Performed by: INTERNAL MEDICINE

## 2024-06-11 PROCEDURE — 2500000005 HC RX 250 GENERAL PHARMACY W/O HCPCS

## 2024-06-11 PROCEDURE — 82330 ASSAY OF CALCIUM: CPT

## 2024-06-11 PROCEDURE — 2500000001 HC RX 250 WO HCPCS SELF ADMINISTERED DRUGS (ALT 637 FOR MEDICARE OP)

## 2024-06-11 PROCEDURE — A4217 STERILE WATER/SALINE, 500 ML: HCPCS

## 2024-06-11 PROCEDURE — 2500000002 HC RX 250 W HCPCS SELF ADMINISTERED DRUGS (ALT 637 FOR MEDICARE OP, ALT 636 FOR OP/ED)

## 2024-06-11 PROCEDURE — A9575 INJ GADOTERATE MEGLUMI 0.1ML: HCPCS | Performed by: INTERNAL MEDICINE

## 2024-06-11 PROCEDURE — 99233 SBSQ HOSP IP/OBS HIGH 50: CPT

## 2024-06-11 PROCEDURE — 70553 MRI BRAIN STEM W/O & W/DYE: CPT | Performed by: RADIOLOGY

## 2024-06-11 PROCEDURE — 84075 ASSAY ALKALINE PHOSPHATASE: CPT

## 2024-06-11 PROCEDURE — 85025 COMPLETE CBC W/AUTO DIFF WBC: CPT

## 2024-06-11 PROCEDURE — 82947 ASSAY GLUCOSE BLOOD QUANT: CPT | Mod: 91,MUE

## 2024-06-11 PROCEDURE — 84100 ASSAY OF PHOSPHORUS: CPT

## 2024-06-11 PROCEDURE — 80158 DRUG ASSAY CYCLOSPORINE: CPT | Performed by: INTERNAL MEDICINE

## 2024-06-11 PROCEDURE — 99233 SBSQ HOSP IP/OBS HIGH 50: CPT | Performed by: INTERNAL MEDICINE

## 2024-06-11 PROCEDURE — 1200000002 HC GENERAL ROOM WITH TELEMETRY DAILY

## 2024-06-11 PROCEDURE — 85520 HEPARIN ASSAY: CPT | Mod: 91

## 2024-06-11 PROCEDURE — P9047 ALBUMIN (HUMAN), 25%, 50ML: HCPCS | Mod: JZ

## 2024-06-11 PROCEDURE — 83735 ASSAY OF MAGNESIUM: CPT

## 2024-06-11 PROCEDURE — 80069 RENAL FUNCTION PANEL: CPT | Mod: CCI

## 2024-06-11 PROCEDURE — 85520 HEPARIN ASSAY: CPT

## 2024-06-11 RX ORDER — FUROSEMIDE 10 MG/ML
40 INJECTION INTRAMUSCULAR; INTRAVENOUS ONCE
Status: COMPLETED | OUTPATIENT
Start: 2024-06-11 | End: 2024-06-11

## 2024-06-11 RX ORDER — MEROPENEM 1 G/1
1 INJECTION, POWDER, FOR SOLUTION INTRAVENOUS EVERY 8 HOURS
Status: DISCONTINUED | OUTPATIENT
Start: 2024-06-11 | End: 2024-06-15 | Stop reason: HOSPADM

## 2024-06-11 RX ORDER — MAGNESIUM SULFATE HEPTAHYDRATE 40 MG/ML
2 INJECTION, SOLUTION INTRAVENOUS ONCE
Status: COMPLETED | OUTPATIENT
Start: 2024-06-11 | End: 2024-06-11

## 2024-06-11 RX ORDER — GADOTERATE MEGLUMINE 376.9 MG/ML
20 INJECTION INTRAVENOUS
Status: COMPLETED | OUTPATIENT
Start: 2024-06-11 | End: 2024-06-11

## 2024-06-11 RX ORDER — ALBUMIN HUMAN 250 G/1000ML
12.5 SOLUTION INTRAVENOUS ONCE
Status: COMPLETED | OUTPATIENT
Start: 2024-06-11 | End: 2024-06-11

## 2024-06-11 RX ADMIN — SENNOSIDES AND DOCUSATE SODIUM 2 TABLET: 50; 8.6 TABLET ORAL at 21:01

## 2024-06-11 RX ADMIN — MEROPENEM 1 G: 1 INJECTION, POWDER, FOR SOLUTION INTRAVENOUS at 20:42

## 2024-06-11 RX ADMIN — PREDNISONE 5 MG: 5 TABLET ORAL at 08:22

## 2024-06-11 RX ADMIN — GADOTERATE MEGLUMINE 20 ML: 376.9 INJECTION INTRAVENOUS at 11:24

## 2024-06-11 RX ADMIN — LEVOTHYROXINE SODIUM 50 MCG: 0.05 TABLET ORAL at 06:38

## 2024-06-11 RX ADMIN — HEPARIN SODIUM 1500 UNITS/HR: 10000 INJECTION, SOLUTION INTRAVENOUS at 08:22

## 2024-06-11 RX ADMIN — SENNOSIDES AND DOCUSATE SODIUM 2 TABLET: 50; 8.6 TABLET ORAL at 08:14

## 2024-06-11 RX ADMIN — MEROPENEM 1 G: 1 INJECTION, POWDER, FOR SOLUTION INTRAVENOUS at 01:07

## 2024-06-11 RX ADMIN — MAGNESIUM SULFATE HEPTAHYDRATE 2 G: 40 INJECTION, SOLUTION INTRAVENOUS at 12:13

## 2024-06-11 RX ADMIN — APIXABAN 5 MG: 5 TABLET, FILM COATED ORAL at 21:01

## 2024-06-11 RX ADMIN — ALBUMIN HUMAN 12.5 G: 0.25 SOLUTION INTRAVENOUS at 12:12

## 2024-06-11 RX ADMIN — MYCOPHENOLATE MOFETIL 500 MG: 200 POWDER, FOR SUSPENSION ORAL at 06:38

## 2024-06-11 RX ADMIN — Medication 4 L/MIN: at 08:00

## 2024-06-11 RX ADMIN — FUROSEMIDE 40 MG: 10 INJECTION, SOLUTION INTRAMUSCULAR; INTRAVENOUS at 17:34

## 2024-06-11 RX ADMIN — CYCLOSPORINE 75 MG: 100 SOLUTION ORAL at 07:49

## 2024-06-11 RX ADMIN — HEPARIN SODIUM 1700 UNITS/HR: 10000 INJECTION, SOLUTION INTRAVENOUS at 13:50

## 2024-06-11 RX ADMIN — POLYETHYLENE GLYCOL 3350 17 G: 17 POWDER, FOR SOLUTION ORAL at 08:19

## 2024-06-11 RX ADMIN — CYCLOSPORINE 75 MG: 100 SOLUTION ORAL at 17:32

## 2024-06-11 RX ADMIN — MEROPENEM 1 G: 1 INJECTION, POWDER, FOR SOLUTION INTRAVENOUS at 12:13

## 2024-06-11 RX ADMIN — MYCOPHENOLATE MOFETIL 500 MG: 200 POWDER, FOR SUSPENSION ORAL at 17:33

## 2024-06-11 RX ADMIN — FUROSEMIDE 40 MG: 10 INJECTION, SOLUTION INTRAMUSCULAR; INTRAVENOUS at 12:12

## 2024-06-11 ASSESSMENT — PAIN - FUNCTIONAL ASSESSMENT
PAIN_FUNCTIONAL_ASSESSMENT: 0-10

## 2024-06-11 ASSESSMENT — COGNITIVE AND FUNCTIONAL STATUS - GENERAL
DRESSING REGULAR UPPER BODY CLOTHING: TOTAL
MOVING TO AND FROM BED TO CHAIR: TOTAL
MOVING FROM LYING ON BACK TO SITTING ON SIDE OF FLAT BED WITH BEDRAILS: TOTAL
MOVING FROM LYING ON BACK TO SITTING ON SIDE OF FLAT BED WITH BEDRAILS: TOTAL
MOVING TO AND FROM BED TO CHAIR: TOTAL
MOBILITY SCORE: 6
DRESSING REGULAR LOWER BODY CLOTHING: TOTAL
CLIMB 3 TO 5 STEPS WITH RAILING: TOTAL
DRESSING REGULAR LOWER BODY CLOTHING: TOTAL
STANDING UP FROM CHAIR USING ARMS: TOTAL
TOILETING: TOTAL
EATING MEALS: TOTAL
WALKING IN HOSPITAL ROOM: TOTAL
PERSONAL GROOMING: TOTAL
STANDING UP FROM CHAIR USING ARMS: TOTAL
DAILY ACTIVITIY SCORE: 6
TOILETING: TOTAL
EATING MEALS: TOTAL
DRESSING REGULAR UPPER BODY CLOTHING: TOTAL
PERSONAL GROOMING: TOTAL
TURNING FROM BACK TO SIDE WHILE IN FLAT BAD: TOTAL
CLIMB 3 TO 5 STEPS WITH RAILING: TOTAL
DAILY ACTIVITIY SCORE: 6
WALKING IN HOSPITAL ROOM: TOTAL
HELP NEEDED FOR BATHING: TOTAL
MOBILITY SCORE: 6
HELP NEEDED FOR BATHING: TOTAL
TURNING FROM BACK TO SIDE WHILE IN FLAT BAD: TOTAL

## 2024-06-11 ASSESSMENT — PAIN SCALES - GENERAL
PAINLEVEL_OUTOF10: 0 - NO PAIN

## 2024-06-11 NOTE — PROGRESS NOTES
Speech-Language Pathology  Therapy Communication Note  Patient Name: Lea Paulino  MRN: [id number]  Today's Date: 6/11/2024   Time: 1440    Discipline: Speech-Language Pathology    Reason: Attempt    Comment:  Swallow tx session to ensure diet tolerance attempted. Pt received asleep + politely declined PO trials at this time 2/2 drowsiness; will re-attempt as pt willing/appropriate and schedule permits

## 2024-06-11 NOTE — PROGRESS NOTES
"        INPATIENT TRANSPLANT NEPHROLOGY PROGRESS NOTE          REASON FOR CONSULT:  Immunosuppressive medication management and nephrology related issues.    SUBJECTION:     No events overnight.   Continue diuresis  UOP 2475ml  Net IO pos 984  Will need to concentrate IV, meds  Discussed with team. Agreed with MRI and diuresis.       PHYCISCAL EXAMINATION:    Visit Vitals  BP (!) 150/95   Pulse 83   Temp 36.5 °C (97.7 °F)   Resp 22   Ht 1.575 m (5' 2.01\")   Wt 97.8 kg (215 lb 9.8 oz)   SpO2 99%   BMI 39.43 kg/m²   Smoking Status Unknown   BSA 2.07 m²        06/09 1900 - 06/11 0659  In: 1840.7 [I.V.:1055.7]  Out: 2900 [Urine:2900]     Weight change:     General Appearance - NAD, Good speech, oriented and alert  HEENT - Supple. Not pale. No jaundice. No cervical lymphadenopathy. Pharynx and tonsils are not injected.  CVS - RRR. Normal S1/S2. No murmur, click , rub or gallop  Lungs- clear to auscultation bilaterally  Abdomen - soft , not tender, no guarding, no rigidity. No hepatosplenomegaly. Normal bowel sounds. No masses and ascites. S/P Kidney transplant .  Transplanted kidney is not tender.   Musculoskeletal /Extremities - no edema. Full ROM. No joint tenderness.   Neuro/Psych - appropriate mood and affect. Motor power V/V all extremities. CN I -XII were grossly intact.  Skin - No visible rash    MEDICATION LIST: REVIEWED    albumin human, 12.5 g, Once  apixaban, 5 mg, q12h  [Held by provider] carvedilol, 6.25 mg, BID  cycloSPORINE, 75 mg, q12h MARTIN  desvenlafaxine succinate, 100 mg, Daily  furosemide, 40 mg, Once  levothyroxine, 50 mcg, q AM  lidocaine, 5 mL, Once  magnesium sulfate, 2 g, Once  meropenem, 1 g, q8h  mycophenolate, 500 mg, BID  oxygen, , Continuous - Inhalation  polyethylene glycol, 17 g, Daily  predniSONE, 5 mg, Daily  sennosides-docusate sodium, 2 tablet, BID      heparin, Last Rate: 1,500 Units/hr (06/11/24 0822)      alteplase, 2 mg, PRN  dextrose, 12.5 g, q15 min PRN  dextrose, 25 g, q15 min " PRN  glucagon, 1 mg, q15 min PRN  glucagon, 1 mg, q15 min PRN  sodium chloride, 1 spray, 4x daily PRN        ALLERGY:  Allergies   Allergen Reactions    Macrodantin [Nitrofurantoin Macrocrystal] GI Upset    Sulfa (Sulfonamide Antibiotics) Rash       LABS:  Results for orders placed or performed during the hospital encounter of 06/03/24 (from the past 24 hour(s))   Calcium, ionized   Result Value Ref Range    POCT Calcium, Ionized 1.22 1.1 - 1.33 mmol/L   Renal function panel   Result Value Ref Range    Glucose 214 (H) 74 - 99 mg/dL    Sodium 143 136 - 145 mmol/L    Potassium 4.7 3.5 - 5.3 mmol/L    Chloride 104 98 - 107 mmol/L    Bicarbonate 32 21 - 32 mmol/L    Anion Gap 12 10 - 20 mmol/L    Urea Nitrogen 29 (H) 6 - 23 mg/dL    Creatinine 1.12 (H) 0.50 - 1.05 mg/dL    eGFR 54 (L) >60 mL/min/1.73m*2    Calcium 8.1 (L) 8.6 - 10.6 mg/dL    Phosphorus 1.9 (L) 2.5 - 4.9 mg/dL    Albumin 2.6 (L) 3.4 - 5.0 g/dL   Heparin Assay, UFH   Result Value Ref Range    Heparin Unfractionated 0.2 See Comment Below for Therapeutic Ranges IU/mL   POCT GLUCOSE   Result Value Ref Range    POCT Glucose 179 (H) 74 - 99 mg/dL   POCT GLUCOSE   Result Value Ref Range    POCT Glucose 143 (H) 74 - 99 mg/dL   POCT GLUCOSE   Result Value Ref Range    POCT Glucose 174 (H) 74 - 99 mg/dL   Heparin Assay, UFH   Result Value Ref Range    Heparin Unfractionated 0.2 See Comment Below for Therapeutic Ranges IU/mL   POCT GLUCOSE   Result Value Ref Range    POCT Glucose 154 (H) 74 - 99 mg/dL   Comprehensive metabolic panel   Result Value Ref Range    Glucose 164 (H) 74 - 99 mg/dL    Sodium 147 (H) 136 - 145 mmol/L    Potassium 4.3 3.5 - 5.3 mmol/L    Chloride 105 98 - 107 mmol/L    Bicarbonate 35 (H) 21 - 32 mmol/L    Anion Gap 11 10 - 20 mmol/L    Urea Nitrogen 29 (H) 6 - 23 mg/dL    Creatinine 1.05 0.50 - 1.05 mg/dL    eGFR 58 (L) >60 mL/min/1.73m*2    Calcium 8.7 8.6 - 10.6 mg/dL    Albumin 2.6 (L) 3.4 - 5.0 g/dL    Alkaline Phosphatase 145 (H) 33 -  136 U/L    Total Protein 5.2 (L) 6.4 - 8.2 g/dL    AST 59 (H) 9 - 39 U/L    Bilirubin, Total 0.7 0.0 - 1.2 mg/dL    ALT 40 7 - 45 U/L   Calcium, ionized   Result Value Ref Range    POCT Calcium, Ionized 1.20 1.1 - 1.33 mmol/L   Cyclosporine level   Result Value Ref Range    Cyclosporine 126 80 - 210 ng/mL   Magnesium   Result Value Ref Range    Magnesium 1.73 1.60 - 2.40 mg/dL   CBC and Auto Differential   Result Value Ref Range    WBC 13.3 (H) 4.4 - 11.3 x10*3/uL    nRBC 0.0 0.0 - 0.0 /100 WBCs    RBC 3.11 (L) 4.00 - 5.20 x10*6/uL    Hemoglobin 10.8 (L) 12.0 - 16.0 g/dL    Hematocrit 35.7 (L) 36.0 - 46.0 %     (H) 80 - 100 fL    MCH 34.7 (H) 26.0 - 34.0 pg    MCHC 30.3 (L) 32.0 - 36.0 g/dL    RDW 16.4 (H) 11.5 - 14.5 %    Platelets 148 (L) 150 - 450 x10*3/uL    Neutrophils % 87.0 40.0 - 80.0 %    Immature Granulocytes %, Automated 0.8 0.0 - 0.9 %    Lymphocytes % 4.3 13.0 - 44.0 %    Monocytes % 6.2 2.0 - 10.0 %    Eosinophils % 1.5 0.0 - 6.0 %    Basophils % 0.2 0.0 - 2.0 %    Neutrophils Absolute 11.56 (H) 1.20 - 7.70 x10*3/uL    Immature Granulocytes Absolute, Automated 0.11 0.00 - 0.70 x10*3/uL    Lymphocytes Absolute 0.57 (L) 1.20 - 4.80 x10*3/uL    Monocytes Absolute 0.83 0.10 - 1.00 x10*3/uL    Eosinophils Absolute 0.20 0.00 - 0.70 x10*3/uL    Basophils Absolute 0.02 0.00 - 0.10 x10*3/uL   Phosphorus   Result Value Ref Range    Phosphorus 3.6 2.5 - 4.9 mg/dL   Heparin Assay, UFH   Result Value Ref Range    Heparin Unfractionated 0.2 See Comment Below for Therapeutic Ranges IU/mL   POCT GLUCOSE   Result Value Ref Range    POCT Glucose 143 (H) 74 - 99 mg/dL   POCT GLUCOSE   Result Value Ref Range    POCT Glucose 166 (H) 74 - 99 mg/dL   POCT GLUCOSE   Result Value Ref Range    POCT Glucose 171 (H) 74 - 99 mg/dL        ASSESSMENT AND PLAN:    69 y.o. 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 Transferred to [facility] on 6/2. ID consulted for emphysematous pyelonephritis.     Transplant nephrology is consulted to assist with immunosuppressive medication management and nephrology related issues.        1. LAURA S/P Kidney transplant. Due to UTI, ATN, pancreatitis    - Renal allograft function: STABLE    -Echocardiogram with EF 40-45% and NORMAL RVSP.    - Continue to diuresis; goal net neg 1-2 L  -Monitor K/Mg/phos. Replace prn.     - Continue to monitor UOP and Serum creatinine closely.   - Avoid nephrotoxic agents, NSAIDs and IV contrast   - Strict I/O.   - Renally dose all medications by the most recent CrCl from Cockcroft-Gault formula.    2. Immunosuppression   - continue current immunosuppression   - Monitor cyclosporine trough level before am dose is given. Target CSA trough level at 12 hour ~  - Continue cyclosporine at current dose. Level today is pending.  -MMF suspension 500 mg bid  - Prednisone 5 mg daily    3. Anemia and WBC   Lab Results   Component Value Date    WBC 13.3 (H) 06/11/2024    HGB 10.8 (L) 06/11/2024    HCT 35.7 (L) 06/11/2024     (H) 06/11/2024     (L) 06/11/2024     -Continue to monitor Hgb   -No indications for PRBC transfusion     4. Electrolyte   Lab Results   Component Value Date    GLUCOSE 164 (H) 06/11/2024    CALCIUM 8.7 06/11/2024     (H) 06/11/2024    K 4.3 06/11/2024    CO2 35 (H) 06/11/2024     06/11/2024    BUN 29 (H) 06/11/2024    CREATININE 1.05 06/11/2024     - Reviewed renal profile.   - Free H2O 250 ml q 6 hours    6. Hypertension   Blood Pressures         6/10/2024  2059 6/11/2024  0012 6/11/2024  0347 6/11/2024  0808 6/11/2024  1208    BP: 141/85 125/82  "163/82 147/84 150/95          -Goal BP < 140/90 mmHg   -continue current management     7. UTI  Defer to ID  On meropenem    8.  GI prophylaxis   - On PPI     9. DVT Prophylaxis  -Defer to primary team    10. Pancreatitis  No results found for: \"AMYLASE\"    Lab Results   Component Value Date    LIPASE 912 (H) 06/08/2024     - Less likely from hypercalcemia. Need to rule out other causes  -defer to primary team.    * Case was discussed with primary team.  For questions, please contact transplant nephrology page x [phone]    Deidra Robb    Transplant Nephrologist    "

## 2024-06-11 NOTE — PROGRESS NOTES
"        INPATIENT TRANSPLANT NEPHROLOGY PROGRESS NOTE          REASON FOR CONSULT:  Immunosuppressive medication management and nephrology related issues.    SUBJECTION:     No events overnight.   Continue diuresis  UOP 1.5 litre  Net IO neg 750 mL  Will need to concentrate IV, meds  Discussed with team. Agreed with MRI and diuresis.       PHYCISCAL EXAMINATION:    Visit Vitals  BP (!) 150/95   Pulse 83   Temp 36.5 °C (97.7 °F)   Resp 22   Ht 1.575 m (5' 2.01\")   Wt 97.8 kg (215 lb 9.8 oz)   SpO2 99%   BMI 39.43 kg/m²   Smoking Status Unknown   BSA 2.07 m²        06/09 1900 - 06/11 0659  In: 1840.7 [I.V.:1055.7]  Out: 2900 [Urine:2900]     Weight change:     General Appearance - NAD, Good speech, oriented and alert  HEENT - Supple. Not pale. No jaundice. No cervical lymphadenopathy. Pharynx and tonsils are not injected.  CVS - RRR. Normal S1/S2. No murmur, click , rub or gallop  Lungs- clear to auscultation bilaterally  Abdomen - soft , not tender, no guarding, no rigidity. No hepatosplenomegaly. Normal bowel sounds. No masses and ascites. S/P Kidney transplant .  Transplanted kidney is not tender.   Musculoskeletal /Extremities - no edema. Full ROM. No joint tenderness.   Neuro/Psych - appropriate mood and affect. Motor power V/V all extremities. CN I -XII were grossly intact.  Skin - No visible rash    MEDICATION LIST: REVIEWED    albumin human, 12.5 g, Once  apixaban, 5 mg, q12h  [Held by provider] carvedilol, 6.25 mg, BID  cycloSPORINE, 75 mg, q12h MARTIN  desvenlafaxine succinate, 100 mg, Daily  levothyroxine, 50 mcg, q AM  lidocaine, 5 mL, Once  magnesium sulfate, 2 g, Once  meropenem, 1 g, q8h  mycophenolate, 500 mg, BID  oxygen, , Continuous - Inhalation  polyethylene glycol, 17 g, Daily  predniSONE, 5 mg, Daily  sennosides-docusate sodium, 2 tablet, BID      heparin, Last Rate: 1,500 Units/hr (06/11/24 0822)      alteplase, 2 mg, PRN  dextrose, 12.5 g, q15 min PRN  dextrose, 25 g, q15 min PRN  glucagon, 1 mg, " q15 min PRN  glucagon, 1 mg, q15 min PRN  sodium chloride, 1 spray, 4x daily PRN        ALLERGY:  Allergies   Allergen Reactions    Macrodantin [Nitrofurantoin Macrocrystal] GI Upset    Sulfa (Sulfonamide Antibiotics) Rash       LABS:  Results for orders placed or performed during the hospital encounter of 06/03/24 (from the past 24 hour(s))   Calcium, ionized   Result Value Ref Range    POCT Calcium, Ionized 1.22 1.1 - 1.33 mmol/L   Renal function panel   Result Value Ref Range    Glucose 214 (H) 74 - 99 mg/dL    Sodium 143 136 - 145 mmol/L    Potassium 4.7 3.5 - 5.3 mmol/L    Chloride 104 98 - 107 mmol/L    Bicarbonate 32 21 - 32 mmol/L    Anion Gap 12 10 - 20 mmol/L    Urea Nitrogen 29 (H) 6 - 23 mg/dL    Creatinine 1.12 (H) 0.50 - 1.05 mg/dL    eGFR 54 (L) >60 mL/min/1.73m*2    Calcium 8.1 (L) 8.6 - 10.6 mg/dL    Phosphorus 1.9 (L) 2.5 - 4.9 mg/dL    Albumin 2.6 (L) 3.4 - 5.0 g/dL   Heparin Assay, UFH   Result Value Ref Range    Heparin Unfractionated 0.2 See Comment Below for Therapeutic Ranges IU/mL   POCT GLUCOSE   Result Value Ref Range    POCT Glucose 179 (H) 74 - 99 mg/dL   POCT GLUCOSE   Result Value Ref Range    POCT Glucose 143 (H) 74 - 99 mg/dL   POCT GLUCOSE   Result Value Ref Range    POCT Glucose 174 (H) 74 - 99 mg/dL   Heparin Assay, UFH   Result Value Ref Range    Heparin Unfractionated 0.2 See Comment Below for Therapeutic Ranges IU/mL   POCT GLUCOSE   Result Value Ref Range    POCT Glucose 154 (H) 74 - 99 mg/dL   Comprehensive metabolic panel   Result Value Ref Range    Glucose 164 (H) 74 - 99 mg/dL    Sodium 147 (H) 136 - 145 mmol/L    Potassium 4.3 3.5 - 5.3 mmol/L    Chloride 105 98 - 107 mmol/L    Bicarbonate 35 (H) 21 - 32 mmol/L    Anion Gap 11 10 - 20 mmol/L    Urea Nitrogen 29 (H) 6 - 23 mg/dL    Creatinine 1.05 0.50 - 1.05 mg/dL    eGFR 58 (L) >60 mL/min/1.73m*2    Calcium 8.7 8.6 - 10.6 mg/dL    Albumin 2.6 (L) 3.4 - 5.0 g/dL    Alkaline Phosphatase 145 (H) 33 - 136 U/L    Total  Protein 5.2 (L) 6.4 - 8.2 g/dL    AST 59 (H) 9 - 39 U/L    Bilirubin, Total 0.7 0.0 - 1.2 mg/dL    ALT 40 7 - 45 U/L   Calcium, ionized   Result Value Ref Range    POCT Calcium, Ionized 1.20 1.1 - 1.33 mmol/L   Cyclosporine level   Result Value Ref Range    Cyclosporine 126 80 - 210 ng/mL   Magnesium   Result Value Ref Range    Magnesium 1.73 1.60 - 2.40 mg/dL   CBC and Auto Differential   Result Value Ref Range    WBC 13.3 (H) 4.4 - 11.3 x10*3/uL    nRBC 0.0 0.0 - 0.0 /100 WBCs    RBC 3.11 (L) 4.00 - 5.20 x10*6/uL    Hemoglobin 10.8 (L) 12.0 - 16.0 g/dL    Hematocrit 35.7 (L) 36.0 - 46.0 %     (H) 80 - 100 fL    MCH 34.7 (H) 26.0 - 34.0 pg    MCHC 30.3 (L) 32.0 - 36.0 g/dL    RDW 16.4 (H) 11.5 - 14.5 %    Platelets 148 (L) 150 - 450 x10*3/uL    Neutrophils % 87.0 40.0 - 80.0 %    Immature Granulocytes %, Automated 0.8 0.0 - 0.9 %    Lymphocytes % 4.3 13.0 - 44.0 %    Monocytes % 6.2 2.0 - 10.0 %    Eosinophils % 1.5 0.0 - 6.0 %    Basophils % 0.2 0.0 - 2.0 %    Neutrophils Absolute 11.56 (H) 1.20 - 7.70 x10*3/uL    Immature Granulocytes Absolute, Automated 0.11 0.00 - 0.70 x10*3/uL    Lymphocytes Absolute 0.57 (L) 1.20 - 4.80 x10*3/uL    Monocytes Absolute 0.83 0.10 - 1.00 x10*3/uL    Eosinophils Absolute 0.20 0.00 - 0.70 x10*3/uL    Basophils Absolute 0.02 0.00 - 0.10 x10*3/uL   Phosphorus   Result Value Ref Range    Phosphorus 3.6 2.5 - 4.9 mg/dL   Heparin Assay, UFH   Result Value Ref Range    Heparin Unfractionated 0.2 See Comment Below for Therapeutic Ranges IU/mL   POCT GLUCOSE   Result Value Ref Range    POCT Glucose 143 (H) 74 - 99 mg/dL   POCT GLUCOSE   Result Value Ref Range    POCT Glucose 166 (H) 74 - 99 mg/dL   POCT GLUCOSE   Result Value Ref Range    POCT Glucose 171 (H) 74 - 99 mg/dL        ASSESSMENT AND PLAN:    69 y.o. female with past medical history of ESRD due to lithium toxicity and HTN, S/P  renal transplant on 10/30/2015, on cyclosporine, MMF, prednisone, hypertension, hyperlipidemia,  COPD, CHF, A-fib, peripheral arterial disease, lymphedema was transferred from outside hospital for concerns of emphysematous pyelonephritis of the transplanted kidney and tiny right transplant non obstructing renal stone along with acute pancreatitis. Transferred to [facility] on 6/2. ID consulted for emphysematous pyelonephritis.     Transplant nephrology is consulted to assist with immunosuppressive medication management and nephrology related issues.        1. LAURA S/P Kidney transplant. Due to UTI, ATN, pancreatitis  Lab Results   Component Value Date    CREATININE 1.05 06/11/2024        - Renal allograft function: STABLE    -Echocardiogram with EF 40-45% and NORMAL RVSP.    - Continue to diuresis; goal net neg 1-2 L  -Monitor K/Mg/phos. Replace prn.     - Continue to monitor UOP and Serum creatinine closely.   - Avoid nephrotoxic agents, NSAIDs and IV contrast   - Strict I/O.   - Renally dose all medications by the most recent CrCl from Cockcroft-Gault formula.    2. Immunosuppression   - continue current immunosuppression   - Monitor cyclosporine trough level before am dose is given. Target CSA trough level at 12 hour ~  - Continue cyclosporine at current dose. Level today = 126 ; continue same dose  -MMF suspension 500 mg bid  - Prednisone 5 mg daily    3. Anemia and WBC   Lab Results   Component Value Date    WBC 13.3 (H) 06/11/2024    HGB 10.8 (L) 06/11/2024    HCT 35.7 (L) 06/11/2024     (H) 06/11/2024     (L) 06/11/2024     -Continue to monitor Hgb   -No indications for PRBC transfusion     4. Electrolyte   Lab Results   Component Value Date    GLUCOSE 164 (H) 06/11/2024    CALCIUM 8.7 06/11/2024     (H) 06/11/2024    K 4.3 06/11/2024    CO2 35 (H) 06/11/2024     06/11/2024    BUN 29 (H) 06/11/2024    CREATININE 1.05 06/11/2024     - Reviewed renal profile.   - increase Free H2O to 300 ml q 6 hours    6. Hypertension   Blood Pressures         6/10/2024  2059 6/11/2024  0012  "6/11/2024  0347 6/11/2024  0808 6/11/2024  1208    BP: 141/85 125/82 163/82 147/84 150/95          -Goal BP < 140/90 mmHg   -continue current management     7. UTI  Defer to ID  On meropenem    8.  GI prophylaxis   - On PPI     9. DVT Prophylaxis  -Defer to primary team    10. Pancreatitis  No results found for: \"AMYLASE\"    Lab Results   Component Value Date    LIPASE 912 (H) 06/08/2024     - Less likely from hypercalcemia. Need to rule out other causes  -defer to primary team.  -Repeat Lipase/amylase tomorrow      11. AMS  -MRI today     * Case was discussed with primary team.  For questions, please contact transplant nephrology page x [phone]    Deidra Robb    Transplant Nephrologist    "

## 2024-06-11 NOTE — PROGRESS NOTES
Physical Therapy                 Therapy Communication Note    Patient Name: Lea Paulino  MRN: [id number]  Today's Date: 6/11/2024     Discipline: Physical Therapy    Missed Visit Reason: Missed Visit Reason: Other (Comment) (pt too lethargic, unable to participate; opened eyes twice to command, possibly squeezed with Right hand but otherwise sleeping.)    Missed Time: Attempt    Comment: 14:29pm

## 2024-06-11 NOTE — PROGRESS NOTES
"Lea Paulino is a 69 y.o. female on day 8 of admission presenting with Pyelonephritis.    Subjective   NAEO. Ms. Abraham remains lethargic this morning, arousable to voice and touch. More alert during rounds and oriented to self and place, but otherwise not able to participate much in conversation.        Objective     Physical Exam  Constitutional:       General: She is not in acute distress.     Appearance: She is ill-appearing.   HENT:      Head: Normocephalic and atraumatic.      Mouth/Throat:      Mouth: Mucous membranes are moist.      Pharynx: Oropharynx is clear. No posterior oropharyngeal erythema.   Eyes:      Extraocular Movements: Extraocular movements intact.      Conjunctiva/sclera: Conjunctivae normal.      Pupils: Pupils are equal, round, and reactive to light.   Cardiovascular:      Rate and Rhythm: Normal rate and regular rhythm.      Pulses: Normal pulses.      Heart sounds: Normal heart sounds.   Pulmonary:      Effort: Pulmonary effort is normal. No respiratory distress.      Breath sounds: Normal breath sounds.   Abdominal:      General: Abdomen is flat. There is no distension.      Palpations: Abdomen is soft.      Tenderness: There is no abdominal tenderness.   Musculoskeletal:         General: Normal range of motion.      Cervical back: Normal range of motion and neck supple.      Comments: 1-2+ b/l pitting edema of upper and lower extremities   Skin:     General: Skin is warm and dry.      Findings: No lesion or rash.   Neurological:      Comments: Intermittent level of alertness, oriented to self and place. Intermittently following commands, remains largely lethargic          Last Recorded Vitals  Blood pressure (!) 150/95, pulse 83, temperature 36.5 °C (97.7 °F), resp. rate 22, height 1.575 m (5' 2.01\"), weight 97.8 kg (215 lb 9.8 oz), SpO2 99%.  Intake/Output last 3 Shifts:  I/O last 3 completed shifts:  In: 1840.7 (18.8 mL/kg) [I.V.:1055.7 (10.8 mL/kg); NG/GT:535; IV " Piggyback:250]  Out: 2900 (29.7 mL/kg) [Urine:2900 (0.8 mL/kg/hr)]  Weight: 97.8 kg     Relevant Results  Results for orders placed or performed during the hospital encounter of 06/03/24 (from the past 24 hour(s))   Calcium, ionized   Result Value Ref Range    POCT Calcium, Ionized 1.22 1.1 - 1.33 mmol/L   Renal function panel   Result Value Ref Range    Glucose 214 (H) 74 - 99 mg/dL    Sodium 143 136 - 145 mmol/L    Potassium 4.7 3.5 - 5.3 mmol/L    Chloride 104 98 - 107 mmol/L    Bicarbonate 32 21 - 32 mmol/L    Anion Gap 12 10 - 20 mmol/L    Urea Nitrogen 29 (H) 6 - 23 mg/dL    Creatinine 1.12 (H) 0.50 - 1.05 mg/dL    eGFR 54 (L) >60 mL/min/1.73m*2    Calcium 8.1 (L) 8.6 - 10.6 mg/dL    Phosphorus 1.9 (L) 2.5 - 4.9 mg/dL    Albumin 2.6 (L) 3.4 - 5.0 g/dL   Heparin Assay, UFH   Result Value Ref Range    Heparin Unfractionated 0.2 See Comment Below for Therapeutic Ranges IU/mL   POCT GLUCOSE   Result Value Ref Range    POCT Glucose 179 (H) 74 - 99 mg/dL   POCT GLUCOSE   Result Value Ref Range    POCT Glucose 143 (H) 74 - 99 mg/dL   POCT GLUCOSE   Result Value Ref Range    POCT Glucose 174 (H) 74 - 99 mg/dL   Heparin Assay, UFH   Result Value Ref Range    Heparin Unfractionated 0.2 See Comment Below for Therapeutic Ranges IU/mL   POCT GLUCOSE   Result Value Ref Range    POCT Glucose 154 (H) 74 - 99 mg/dL   Comprehensive metabolic panel   Result Value Ref Range    Glucose 164 (H) 74 - 99 mg/dL    Sodium 147 (H) 136 - 145 mmol/L    Potassium 4.3 3.5 - 5.3 mmol/L    Chloride 105 98 - 107 mmol/L    Bicarbonate 35 (H) 21 - 32 mmol/L    Anion Gap 11 10 - 20 mmol/L    Urea Nitrogen 29 (H) 6 - 23 mg/dL    Creatinine 1.05 0.50 - 1.05 mg/dL    eGFR 58 (L) >60 mL/min/1.73m*2    Calcium 8.7 8.6 - 10.6 mg/dL    Albumin 2.6 (L) 3.4 - 5.0 g/dL    Alkaline Phosphatase 145 (H) 33 - 136 U/L    Total Protein 5.2 (L) 6.4 - 8.2 g/dL    AST 59 (H) 9 - 39 U/L    Bilirubin, Total 0.7 0.0 - 1.2 mg/dL    ALT 40 7 - 45 U/L   Calcium, ionized    Result Value Ref Range    POCT Calcium, Ionized 1.20 1.1 - 1.33 mmol/L   Cyclosporine level   Result Value Ref Range    Cyclosporine 126 80 - 210 ng/mL   Magnesium   Result Value Ref Range    Magnesium 1.73 1.60 - 2.40 mg/dL   CBC and Auto Differential   Result Value Ref Range    WBC 13.3 (H) 4.4 - 11.3 x10*3/uL    nRBC 0.0 0.0 - 0.0 /100 WBCs    RBC 3.11 (L) 4.00 - 5.20 x10*6/uL    Hemoglobin 10.8 (L) 12.0 - 16.0 g/dL    Hematocrit 35.7 (L) 36.0 - 46.0 %     (H) 80 - 100 fL    MCH 34.7 (H) 26.0 - 34.0 pg    MCHC 30.3 (L) 32.0 - 36.0 g/dL    RDW 16.4 (H) 11.5 - 14.5 %    Platelets 148 (L) 150 - 450 x10*3/uL    Neutrophils % 87.0 40.0 - 80.0 %    Immature Granulocytes %, Automated 0.8 0.0 - 0.9 %    Lymphocytes % 4.3 13.0 - 44.0 %    Monocytes % 6.2 2.0 - 10.0 %    Eosinophils % 1.5 0.0 - 6.0 %    Basophils % 0.2 0.0 - 2.0 %    Neutrophils Absolute 11.56 (H) 1.20 - 7.70 x10*3/uL    Immature Granulocytes Absolute, Automated 0.11 0.00 - 0.70 x10*3/uL    Lymphocytes Absolute 0.57 (L) 1.20 - 4.80 x10*3/uL    Monocytes Absolute 0.83 0.10 - 1.00 x10*3/uL    Eosinophils Absolute 0.20 0.00 - 0.70 x10*3/uL    Basophils Absolute 0.02 0.00 - 0.10 x10*3/uL   Phosphorus   Result Value Ref Range    Phosphorus 3.6 2.5 - 4.9 mg/dL   Heparin Assay, UFH   Result Value Ref Range    Heparin Unfractionated 0.2 See Comment Below for Therapeutic Ranges IU/mL   POCT GLUCOSE   Result Value Ref Range    POCT Glucose 143 (H) 74 - 99 mg/dL   POCT GLUCOSE   Result Value Ref Range    POCT Glucose 166 (H) 74 - 99 mg/dL   POCT GLUCOSE   Result Value Ref Range    POCT Glucose 171 (H) 74 - 99 mg/dL             Assessment/Plan   Principal Problem:    Pyelonephritis    Lea Paulino is a 69 y.o. female with a history of COPD, renal transplant in 2015 on cyclosporine, mycophenolate and prednisone, HTN, HLD, hypothyroid, hyperparathyroid, HFmEF (45-50%), AFib, PVD, lymphedema, admitted for emphysematous pyelonephritis and acute  pancreatitis in the setting of severe hypercalcemia. Initially treated with IV fluids and abx. Transferred to MICU 6/4 for AHRF s/t pulmonary edema and intubated 6/4-6/5 for airway protection. Extubated 6/5, stable on 2.5L transferred back to floor. Hsopital course further complicated by hypoglycemia (improving) and hypernatremia. Transplant ID and nephrology following patient.     Updates 6/11:   -Patient remains lethargic and delirious in setting of numerous metabolic derangements.   -EEG completed, pending read. MRI pending this AM  -LAURA resolving, though patient remains edematous and is not at diuresis goal; plan to give 12.5 g 25% albumin this morning followed by 40 mg IV Lasix, will repeat 40 mg IV Lasix this evening if not net negative 1L   -Given improvement in LAURA, will transition back to home apixaban 5 mg BID this evening  -Glucose remains relatively stable on tube feeds, will check fasting glucose upon return from MRI   -Na rising again after adjustment to FWF yesterday; will increase to 300 ml q6h   -Continue immunosuppression at current dosing per discussion with transplant     #Lethargy  #Acute encephalopathy   ::Suspected to be multifactorial in setting of hypoglycemia + hypernatremia+ delirium + infection +/- hypoxia  -CTH w/o acute ischemic or hemorrhage  -Remains persistently delirious despite improvement in hypoglycemia, hypoxia, hypernatremia, and antibiotic coverage  -Discussed with ID, low concern for meningitis or encephalitis at this time. May consider LP if neuro status does not improve and imaging is not revealing  -MRI, spot EEG, and HSV PCR pending  -DHT placed 6/8, Tfs currently at goal. Passed MBS while more alert this morning but nursing instructed not to allow PO intake if altered. Will continue Tfs for time being while D5 is stopped and patient transitions to PO intake     #Emphysematous pyelonephritis  :: CT A/P with gas in the RLQ transplant kidney collecting system and in the  urinary bladder, possible gas-forming infection  :: s/p vanc/zosyn and meropenem/gentamicin at OSH  :: previously grew pan-sensitive Pseudomonas (4/24) and E. faecalis (resistant to gentamicin, intermediate to doxycycline 1/22)  -Bcx 6/3 NGTD, repeat blood cx (6/7) NGD2  -Ucx 6/3 and 6/6 NGTD  -Transplant ID consulted, appreciate recs  -s/p vanc 6/3-6/8  -c/w meropenem 6/3-6/16 (total 14 day course) per ID recs  -consider urology c/s for nephrostomy tube if becomes hemodynamically unstable     #AHRF  :: CXR w/ right pleural effusion with adjacent atelectasis/consolidation and interval development of a small left pleural effusion with adjacent atelectasis  -on arivo overnight 6/7, weaned to 4L NC  -Repeat diuresis today with albumin, IV Lasix 40 mg     #LAURA on CKD of transplanted kidney (2015), improving  :: Follows with Dr Gay at  for renal transplant, on mycophenolate, cyclosporine, prednisone  :: Creatinine on admission to [facility] 2.7 (baseline 1.5-1.7)  :: LAURA likely prerenal in setting of pancreatitis and hypovolemia  -Transplant nephrology following  -Continue prednisone 5 mg daily,   BID (home dose 1 g daily), cyclosporine 75 mg BID (home dose 100 mg BID)  -CMV titer undetected, EBV detected, BK detected viral load 28  -Diuresis as per above, strict I/Os     #Hypercalcemia, resolved  #Hypocalcemia-resolved  :: ddx primary hyperparathyroidism w/ remaining parathyroid tissue vs less likely MEN1 given possible recurrent, significant hypoglycemia concerning for insulinoma  :: s/p subtotal hemithyroidectomy & parathyroidectomy in 2006   :: Calcium 15.9 at [facility], treated with 4 units/kg calcitonin and IV NS but no bisphosphonate, repeat iCa subsequently fell below normal limits on 6/9. Now WNL (1.22) on repeat 6/10  - iPTH appropriately low on admission, repeat elevated at 204  -PTHrP, Vitamin D 25 and D-1,25 levels WNL, pending SPEP, UPEP   -Holding home calcitriol, continue to trend  daily iCal, phos, and Mg  -Endocrine consulted, now signed off     #Hypernatremia  -147 (6/9)-> 143 (6/10)-> 147 (6/11)  -Will increase FWF to 300 ml q6h     #Acute pancreatitis, resolved  :: likely secondary to hypercalcemia (15.9)   :: Lipase 1039, CT findings supportive, low back pain initial presentation    - triglycerides obtained- 82  -Repeat CT in a few weeks regarding fluid collections     #Reported nonsustained VT at [facility]  #Hypertension  #HFmrEF  - home metoprolol tartrate 12.5 BID switched to coreg 6.25 mg BID  - Optimize electrolytes K > 4, Mg > 2  - EKG 6/4 -> NSR, Left axis deviation  - Holding Bumex 1 mg for now  - Echo- EF 40-45%      #Bipolar Disorder  -Psych consulted   -resume home desvenlafaxine 100mg  -start home clozapine at 25 mg at night, starting 6/7 increase to home dose of 50 mg      #Afib  #Hx of DVT LLE 2022  -c/w coreg 6.25 mg BID  -Will transition off heparin gtt and resume home Eliquis this evening given improvement in LAURA      #Hypothyroidism  - Continue levothyroxine     F: diuresing  E: replete prn, monitor Ca  N: regular, Tfs at 45 ml/hr   A: PIV  DVT ppx: heparin gtt-> apixaban      Code status: full  NOK: Son Cesar- [phone]                    Belkis Paulino MD

## 2024-06-11 NOTE — CARE PLAN
The patient's goals for the shift include      The clinical goals for the shift include Patient will be normaglycemic during my shift    Patient is alert to self. VSS. MRI completed this shift. Patient is lethargic, but responds when name is called. Patient refused regular PO diet this shift.  Heparin dose increase this shift. IV Mg given this.       Problem: Pain  Goal: My pain/discomfort is manageable  Outcome: Progressing     Problem: Safety  Goal: Patient will be injury free during hospitalization  Outcome: Progressing  Goal: I will remain free of falls  Outcome: Progressing     Problem: Daily Care  Goal: Daily care needs are met  Outcome: Progressing     Problem: Psychosocial Needs  Goal: Demonstrates ability to cope with hospitalization/illness  Outcome: Progressing  Goal: Collaborate with me, my family, and caregiver to identify my specific goals  Outcome: Progressing     Problem: Discharge Barriers  Goal: My discharge needs are met  Outcome: Progressing     Problem: Skin  Goal: Participates in plan/prevention/treatment measures  Outcome: Progressing  Goal: Prevent/manage excess moisture  Outcome: Progressing  Goal: Prevent/minimize sheer/friction injuries  Outcome: Progressing  Goal: Promote/optimize nutrition  Outcome: Progressing  Goal: Promote skin healing  Outcome: Progressing     Problem: Respiratory  Goal: Clear secretions with interventions this shift  Outcome: Progressing  Goal: Minimize anxiety/maximize coping throughout shift  Outcome: Progressing  Goal: Minimal/no exertional discomfort or dyspnea this shift  Outcome: Progressing  Goal: No signs of respiratory distress (eg. Use of accessory muscles. Peds grunting)  Outcome: Progressing  Goal: Patent airway maintained this shift  Outcome: Progressing  Goal: Tolerate mechanical ventilation evidenced by VS/agitation level this shift  Outcome: Progressing  Goal: Tolerate pulmonary toileting this shift  Outcome: Progressing  Goal: Verbalize decreased  shortness of breath this shift  Outcome: Progressing  Goal: Wean oxygen to maintain O2 saturation per order/standard this shift  Outcome: Progressing  Goal: Increase self care and/or family involvement in next 24 hours  Outcome: Progressing

## 2024-06-11 NOTE — PROGRESS NOTES
Social Work Note:    NATASHA received a message from the patient's son Cesar who reported that he would prefer the patient go to [facility].  ANTASHA had the discharge support center send a referral to both [facility] (Cesar's choice from yesterday) and [facility].  Patient is not yet medically ready.  NATASHA will continue to follow    CANDACE Urban, NATASHA-S

## 2024-06-12 LAB
ALBUMIN SERPL BCP-MCNC: 2.8 G/DL (ref 3.4–5)
ALP SERPL-CCNC: 124 U/L (ref 33–136)
ALT SERPL W P-5'-P-CCNC: 32 U/L (ref 7–45)
ANION GAP SERPL CALC-SCNC: 11 MMOL/L (ref 10–20)
AST SERPL W P-5'-P-CCNC: 40 U/L (ref 9–39)
BASOPHILS # BLD AUTO: 0.03 X10*3/UL (ref 0–0.1)
BASOPHILS NFR BLD AUTO: 0.3 %
BILIRUB SERPL-MCNC: 0.6 MG/DL (ref 0–1.2)
BUN SERPL-MCNC: 28 MG/DL (ref 6–23)
CA-I BLD-SCNC: 1.33 MMOL/L (ref 1.1–1.33)
CALCIUM SERPL-MCNC: 9.8 MG/DL (ref 8.6–10.6)
CHLORIDE SERPL-SCNC: 100 MMOL/L (ref 98–107)
CO2 SERPL-SCNC: 41 MMOL/L (ref 21–32)
CREAT SERPL-MCNC: 0.99 MG/DL (ref 0.5–1.05)
CYCLOSPORINE BLD IA-MCNC: 133 NG/ML (ref 80–210)
EGFRCR SERPLBLD CKD-EPI 2021: 62 ML/MIN/1.73M*2
EOSINOPHIL # BLD AUTO: 0.19 X10*3/UL (ref 0–0.7)
EOSINOPHIL NFR BLD AUTO: 1.8 %
ERYTHROCYTE [DISTWIDTH] IN BLOOD BY AUTOMATED COUNT: 15.9 % (ref 11.5–14.5)
ERYTHROCYTE [DISTWIDTH] IN BLOOD BY AUTOMATED COUNT: 15.9 % (ref 11.5–14.5)
GLUCOSE BLD MANUAL STRIP-MCNC: 159 MG/DL (ref 74–99)
GLUCOSE BLD MANUAL STRIP-MCNC: 170 MG/DL (ref 74–99)
GLUCOSE BLD MANUAL STRIP-MCNC: 172 MG/DL (ref 74–99)
GLUCOSE BLD MANUAL STRIP-MCNC: 174 MG/DL (ref 74–99)
GLUCOSE BLD MANUAL STRIP-MCNC: 188 MG/DL (ref 74–99)
GLUCOSE BLD MANUAL STRIP-MCNC: 191 MG/DL (ref 74–99)
GLUCOSE SERPL-MCNC: 186 MG/DL (ref 74–99)
HCT VFR BLD AUTO: 33.3 % (ref 36–46)
HCT VFR BLD AUTO: 34.5 % (ref 36–46)
HGB BLD-MCNC: 10.6 G/DL (ref 12–16)
HGB BLD-MCNC: 10.6 G/DL (ref 12–16)
IMM GRANULOCYTES # BLD AUTO: 0.09 X10*3/UL (ref 0–0.7)
IMM GRANULOCYTES NFR BLD AUTO: 0.8 % (ref 0–0.9)
LYMPHOCYTES # BLD AUTO: 0.54 X10*3/UL (ref 1.2–4.8)
LYMPHOCYTES NFR BLD AUTO: 5.1 %
MAGNESIUM SERPL-MCNC: 1.9 MG/DL (ref 1.6–2.4)
MCH RBC QN AUTO: 34.6 PG (ref 26–34)
MCH RBC QN AUTO: 35.5 PG (ref 26–34)
MCHC RBC AUTO-ENTMCNC: 30.7 G/DL (ref 32–36)
MCHC RBC AUTO-ENTMCNC: 31.8 G/DL (ref 32–36)
MCV RBC AUTO: 111 FL (ref 80–100)
MCV RBC AUTO: 113 FL (ref 80–100)
MONOCYTES # BLD AUTO: 0.82 X10*3/UL (ref 0.1–1)
MONOCYTES NFR BLD AUTO: 7.7 %
NEUTROPHILS # BLD AUTO: 8.94 X10*3/UL (ref 1.2–7.7)
NEUTROPHILS NFR BLD AUTO: 84.3 %
NRBC BLD-RTO: 0 /100 WBCS (ref 0–0)
NRBC BLD-RTO: 0 /100 WBCS (ref 0–0)
PHOSPHATE SERPL-MCNC: 3.3 MG/DL (ref 2.5–4.9)
PLATELET # BLD AUTO: 142 X10*3/UL (ref 150–450)
PLATELET # BLD AUTO: 152 X10*3/UL (ref 150–450)
POTASSIUM SERPL-SCNC: 4.6 MMOL/L (ref 3.5–5.3)
PROT SERPL-MCNC: 5.2 G/DL (ref 6.4–8.2)
RBC # BLD AUTO: 2.99 X10*6/UL (ref 4–5.2)
RBC # BLD AUTO: 3.06 X10*6/UL (ref 4–5.2)
SODIUM SERPL-SCNC: 147 MMOL/L (ref 136–145)
WBC # BLD AUTO: 10 X10*3/UL (ref 4.4–11.3)
WBC # BLD AUTO: 10.6 X10*3/UL (ref 4.4–11.3)

## 2024-06-12 PROCEDURE — 2500000004 HC RX 250 GENERAL PHARMACY W/ HCPCS (ALT 636 FOR OP/ED)

## 2024-06-12 PROCEDURE — 2500000002 HC RX 250 W HCPCS SELF ADMINISTERED DRUGS (ALT 637 FOR MEDICARE OP, ALT 636 FOR OP/ED)

## 2024-06-12 PROCEDURE — 82330 ASSAY OF CALCIUM: CPT

## 2024-06-12 PROCEDURE — 84100 ASSAY OF PHOSPHORUS: CPT

## 2024-06-12 PROCEDURE — 99233 SBSQ HOSP IP/OBS HIGH 50: CPT

## 2024-06-12 PROCEDURE — 83735 ASSAY OF MAGNESIUM: CPT

## 2024-06-12 PROCEDURE — 2500000005 HC RX 250 GENERAL PHARMACY W/O HCPCS

## 2024-06-12 PROCEDURE — 1200000002 HC GENERAL ROOM WITH TELEMETRY DAILY

## 2024-06-12 PROCEDURE — 2500000001 HC RX 250 WO HCPCS SELF ADMINISTERED DRUGS (ALT 637 FOR MEDICARE OP)

## 2024-06-12 PROCEDURE — 80158 DRUG ASSAY CYCLOSPORINE: CPT | Performed by: INTERNAL MEDICINE

## 2024-06-12 PROCEDURE — A4217 STERILE WATER/SALINE, 500 ML: HCPCS

## 2024-06-12 PROCEDURE — 82947 ASSAY GLUCOSE BLOOD QUANT: CPT | Mod: 91

## 2024-06-12 PROCEDURE — 99233 SBSQ HOSP IP/OBS HIGH 50: CPT | Performed by: INTERNAL MEDICINE

## 2024-06-12 PROCEDURE — 85025 COMPLETE CBC W/AUTO DIFF WBC: CPT

## 2024-06-12 PROCEDURE — 80053 COMPREHEN METABOLIC PANEL: CPT

## 2024-06-12 PROCEDURE — 92526 ORAL FUNCTION THERAPY: CPT | Mod: GN

## 2024-06-12 PROCEDURE — 85027 COMPLETE CBC AUTOMATED: CPT

## 2024-06-12 RX ADMIN — MEROPENEM 1 G: 1 INJECTION, POWDER, FOR SOLUTION INTRAVENOUS at 11:26

## 2024-06-12 RX ADMIN — APIXABAN 5 MG: 5 TABLET, FILM COATED ORAL at 09:09

## 2024-06-12 RX ADMIN — MYCOPHENOLATE MOFETIL 500 MG: 200 POWDER, FOR SUSPENSION ORAL at 18:38

## 2024-06-12 RX ADMIN — MEROPENEM 1 G: 1 INJECTION, POWDER, FOR SOLUTION INTRAVENOUS at 04:10

## 2024-06-12 RX ADMIN — LEVOTHYROXINE SODIUM 50 MCG: 0.05 TABLET ORAL at 06:41

## 2024-06-12 RX ADMIN — PREDNISONE 5 MG: 5 TABLET ORAL at 09:09

## 2024-06-12 RX ADMIN — MYCOPHENOLATE MOFETIL 500 MG: 200 POWDER, FOR SUSPENSION ORAL at 06:41

## 2024-06-12 RX ADMIN — Medication 4 L/MIN: at 08:00

## 2024-06-12 RX ADMIN — CYCLOSPORINE 75 MG: 100 SOLUTION ORAL at 18:38

## 2024-06-12 RX ADMIN — SENNOSIDES AND DOCUSATE SODIUM 2 TABLET: 50; 8.6 TABLET ORAL at 09:09

## 2024-06-12 RX ADMIN — MEROPENEM 1 G: 1 INJECTION, POWDER, FOR SOLUTION INTRAVENOUS at 20:37

## 2024-06-12 RX ADMIN — APIXABAN 5 MG: 5 TABLET, FILM COATED ORAL at 20:37

## 2024-06-12 RX ADMIN — SENNOSIDES AND DOCUSATE SODIUM 2 TABLET: 50; 8.6 TABLET ORAL at 20:37

## 2024-06-12 RX ADMIN — CYCLOSPORINE 75 MG: 100 SOLUTION ORAL at 06:41

## 2024-06-12 RX ADMIN — POLYETHYLENE GLYCOL 3350 17 G: 17 POWDER, FOR SOLUTION ORAL at 09:09

## 2024-06-12 ASSESSMENT — COGNITIVE AND FUNCTIONAL STATUS - GENERAL
STANDING UP FROM CHAIR USING ARMS: TOTAL
CLIMB 3 TO 5 STEPS WITH RAILING: TOTAL
MOVING TO AND FROM BED TO CHAIR: TOTAL
PERSONAL GROOMING: TOTAL
DAILY ACTIVITIY SCORE: 6
WALKING IN HOSPITAL ROOM: TOTAL
TURNING FROM BACK TO SIDE WHILE IN FLAT BAD: TOTAL
HELP NEEDED FOR BATHING: TOTAL
DRESSING REGULAR LOWER BODY CLOTHING: TOTAL
MOVING FROM LYING ON BACK TO SITTING ON SIDE OF FLAT BED WITH BEDRAILS: TOTAL
EATING MEALS: TOTAL
MOBILITY SCORE: 6
TOILETING: TOTAL
DRESSING REGULAR UPPER BODY CLOTHING: TOTAL

## 2024-06-12 ASSESSMENT — PAIN SCALES - GENERAL
PAINLEVEL_OUTOF10: 0 - NO PAIN
PAINLEVEL_OUTOF10: 0 - NO PAIN

## 2024-06-12 ASSESSMENT — PAIN - FUNCTIONAL ASSESSMENT: PAIN_FUNCTIONAL_ASSESSMENT: 0-10

## 2024-06-12 NOTE — PROGRESS NOTES
"        INPATIENT TRANSPLANT NEPHROLOGY PROGRESS NOTE          REASON FOR CONSULT:  Immunosuppressive medication management and nephrology related issues.    SUBJECTION:     No events overnight.   Continue diuresis  UOP 4 litre  Net IO neg 2.7 L  Will cut back on diuretics  Will need to concentrate IV, meds  HCO3 was elevated 40    PHYCISCAL EXAMINATION:    Visit Vitals  /68   Pulse 80   Temp 36.4 °C (97.5 °F)   Resp 20   Ht 1.575 m (5' 2.01\")   Wt 97.8 kg (215 lb 9.8 oz)   SpO2 96%   BMI 39.43 kg/m²   Smoking Status Unknown   BSA 2.07 m²        06/10 1900 - 06/12 0659  In: 1440   Out: 3950 [Urine:3950]     Weight change:     General Appearance - NAD, Good speech, oriented and alert  HEENT - Supple. Not pale. No jaundice. No cervical lymphadenopathy. Pharynx and tonsils are not injected.  CVS - RRR. Normal S1/S2. No murmur, click , rub or gallop  Lungs- clear to auscultation bilaterally  Abdomen - soft , not tender, no guarding, no rigidity. No hepatosplenomegaly. Normal bowel sounds. No masses and ascites. S/P Kidney transplant .  Transplanted kidney is not tender.   Musculoskeletal /Extremities - no edema. Full ROM. No joint tenderness.   Neuro/Psych - appropriate mood and affect. Motor power V/V all extremities. CN I -XII were grossly intact.  Skin - No visible rash    MEDICATION LIST: REVIEWED    apixaban, 5 mg, q12h  [Held by provider] carvedilol, 6.25 mg, BID  cycloSPORINE, 75 mg, q12h MARTIN  desvenlafaxine succinate, 100 mg, Daily  levothyroxine, 50 mcg, q AM  lidocaine, 5 mL, Once  meropenem, 1 g, q8h  mycophenolate, 500 mg, BID  oxygen, , Continuous - Inhalation  polyethylene glycol, 17 g, Daily  predniSONE, 5 mg, Daily  sennosides-docusate sodium, 2 tablet, BID           alteplase, 2 mg, PRN  dextrose, 12.5 g, q15 min PRN  dextrose, 25 g, q15 min PRN  glucagon, 1 mg, q15 min PRN  glucagon, 1 mg, q15 min PRN  sodium chloride, 1 spray, 4x daily PRN        ALLERGY:  Allergies   Allergen Reactions    " Macrodantin [Nitrofurantoin Macrocrystal] GI Upset    Sulfa (Sulfonamide Antibiotics) Rash       LABS:  Results for orders placed or performed during the hospital encounter of 06/03/24 (from the past 24 hour(s))   POCT GLUCOSE   Result Value Ref Range    POCT Glucose 172 (H) 74 - 99 mg/dL   Renal function panel   Result Value Ref Range    Glucose 176 (H) 74 - 99 mg/dL    Sodium 148 (H) 136 - 145 mmol/L    Potassium 4.7 3.5 - 5.3 mmol/L    Chloride 102 98 - 107 mmol/L    Bicarbonate 39 (H) 21 - 32 mmol/L    Anion Gap 12 10 - 20 mmol/L    Urea Nitrogen 27 (H) 6 - 23 mg/dL    Creatinine 1.01 0.50 - 1.05 mg/dL    eGFR 61 >60 mL/min/1.73m*2    Calcium 8.7 8.6 - 10.6 mg/dL    Phosphorus 3.3 2.5 - 4.9 mg/dL    Albumin 2.5 (L) 3.4 - 5.0 g/dL   Heparin Assay, UFH   Result Value Ref Range    Heparin Unfractionated 0.3 See Comment Below for Therapeutic Ranges IU/mL   POCT GLUCOSE   Result Value Ref Range    POCT Glucose 176 (H) 74 - 99 mg/dL   CBC   Result Value Ref Range    WBC 10.0 4.4 - 11.3 x10*3/uL    nRBC 0.0 0.0 - 0.0 /100 WBCs    RBC 2.99 (L) 4.00 - 5.20 x10*6/uL    Hemoglobin 10.6 (L) 12.0 - 16.0 g/dL    Hematocrit 33.3 (L) 36.0 - 46.0 %     (H) 80 - 100 fL    MCH 35.5 (H) 26.0 - 34.0 pg    MCHC 31.8 (L) 32.0 - 36.0 g/dL    RDW 15.9 (H) 11.5 - 14.5 %    Platelets 142 (L) 150 - 450 x10*3/uL   POCT GLUCOSE   Result Value Ref Range    POCT Glucose 159 (H) 74 - 99 mg/dL   POCT GLUCOSE   Result Value Ref Range    POCT Glucose 170 (H) 74 - 99 mg/dL   Comprehensive metabolic panel   Result Value Ref Range    Glucose 186 (H) 74 - 99 mg/dL    Sodium 147 (H) 136 - 145 mmol/L    Potassium 4.6 3.5 - 5.3 mmol/L    Chloride 100 98 - 107 mmol/L    Bicarbonate 41 (HH) 21 - 32 mmol/L    Anion Gap 11 10 - 20 mmol/L    Urea Nitrogen 28 (H) 6 - 23 mg/dL    Creatinine 0.99 0.50 - 1.05 mg/dL    eGFR 62 >60 mL/min/1.73m*2    Calcium 9.8 8.6 - 10.6 mg/dL    Albumin 2.8 (L) 3.4 - 5.0 g/dL    Alkaline Phosphatase 124 33 - 136 U/L     Total Protein 5.2 (L) 6.4 - 8.2 g/dL    AST 40 (H) 9 - 39 U/L    Bilirubin, Total 0.6 0.0 - 1.2 mg/dL    ALT 32 7 - 45 U/L   Calcium, ionized   Result Value Ref Range    POCT Calcium, Ionized 1.33 1.1 - 1.33 mmol/L   Cyclosporine level   Result Value Ref Range    Cyclosporine 133 80 - 210 ng/mL   Magnesium   Result Value Ref Range    Magnesium 1.90 1.60 - 2.40 mg/dL   CBC and Auto Differential   Result Value Ref Range    WBC 10.6 4.4 - 11.3 x10*3/uL    nRBC 0.0 0.0 - 0.0 /100 WBCs    RBC 3.06 (L) 4.00 - 5.20 x10*6/uL    Hemoglobin 10.6 (L) 12.0 - 16.0 g/dL    Hematocrit 34.5 (L) 36.0 - 46.0 %     (H) 80 - 100 fL    MCH 34.6 (H) 26.0 - 34.0 pg    MCHC 30.7 (L) 32.0 - 36.0 g/dL    RDW 15.9 (H) 11.5 - 14.5 %    Platelets 152 150 - 450 x10*3/uL    Neutrophils % 84.3 40.0 - 80.0 %    Immature Granulocytes %, Automated 0.8 0.0 - 0.9 %    Lymphocytes % 5.1 13.0 - 44.0 %    Monocytes % 7.7 2.0 - 10.0 %    Eosinophils % 1.8 0.0 - 6.0 %    Basophils % 0.3 0.0 - 2.0 %    Neutrophils Absolute 8.94 (H) 1.20 - 7.70 x10*3/uL    Immature Granulocytes Absolute, Automated 0.09 0.00 - 0.70 x10*3/uL    Lymphocytes Absolute 0.54 (L) 1.20 - 4.80 x10*3/uL    Monocytes Absolute 0.82 0.10 - 1.00 x10*3/uL    Eosinophils Absolute 0.19 0.00 - 0.70 x10*3/uL    Basophils Absolute 0.03 0.00 - 0.10 x10*3/uL   Phosphorus   Result Value Ref Range    Phosphorus 3.3 2.5 - 4.9 mg/dL   POCT GLUCOSE   Result Value Ref Range    POCT Glucose 191 (H) 74 - 99 mg/dL   POCT GLUCOSE   Result Value Ref Range    POCT Glucose 174 (H) 74 - 99 mg/dL        ASSESSMENT AND PLAN:    69 y.o. 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 Transferred to [facility] on 6/2. ID consulted for emphysematous pyelonephritis.     Transplant nephrology is consulted to assist with immunosuppressive medication management and nephrology related issues.        1. LAURA S/P Kidney transplant. Due to UTI, ATN, pancreatitis  Lab Results   Component Value Date    CREATININE 0.99 06/12/2024        - Renal allograft function: STABLE    -Echocardiogram with EF 40-45% and NORMAL RVSP.    -Would give a break from diuresis today due to elevated HCO3 and high UOP 4 L yesterday  -Monitor K/Mg/phos. Replace prn.     - Continue to monitor UOP and Serum creatinine closely.   - Avoid nephrotoxic agents, NSAIDs and IV contrast   - Strict I/O.   - Renally dose all medications by the most recent CrCl from Cockcroft-Gault formula.    2. Immunosuppression   - continue current immunosuppression   - Monitor cyclosporine trough level before am dose is given. Target CSA trough level at 12 hour ~  - Continue cyclosporine at current dose. Level today = 133 ; continue same dose  -MMF suspension 500 mg bid  - Prednisone 5 mg daily    3. Anemia and WBC   Lab Results   Component Value Date    WBC 10.6 06/12/2024    HGB 10.6 (L) 06/12/2024    HCT 34.5 (L) 06/12/2024     (H) 06/12/2024     06/12/2024     -Continue to monitor Hgb   -No indications for PRBC transfusion     4. Electrolyte   Lab Results   Component Value Date    GLUCOSE 186 (H) 06/12/2024    CALCIUM 9.8 06/12/2024     (H) 06/12/2024    K 4.6 06/12/2024    CO2 41 (HH) 06/12/2024     06/12/2024    BUN 28 (H) 06/12/2024    CREATININE 0.99 06/12/2024     - Reviewed renal profile.   - On Free H2O 350 ml q 6 hours  - Consider adjusting TF to lower salt intake  -Her bicarb level has increased up to 40. Would hold diuresis. Obtain  CXR. Wean down oxygen as tolerated. Would also get ABG x once to check pH and P CO2     6. Hypertension   Blood Pressures          "6/11/2024  2117 6/12/2024  0047 6/12/2024  0525 6/12/2024  0835 6/12/2024  1211    BP: 120/72 104/73 136/70 122/68 165/68          -Goal BP < 140/90 mmHg   -continue current management     7. UTI  Defer to ID  On meropenem    8.  GI prophylaxis   - On PPI     9. DVT Prophylaxis  -Defer to primary team    10. Pancreatitis  No results found for: \"AMYLASE\"    Lab Results   Component Value Date    LIPASE 912 (H) 06/08/2024     - Less likely from hypercalcemia. Need to rule out other causes  -defer to primary team.  -Repeat Lipase/amylase tomorrow      11. AMS  -MRI  -Defer to primary team    * Case was discussed with primary team.  For questions, please contact transplant nephrology page x [phone]    Deidra Robb    Transplant Nephrologist    "

## 2024-06-12 NOTE — PROGRESS NOTES
Speech-Language Pathology  Adult Inpatient Swallow Treatment    Patient Name: Lea Paulino  MRN: [id number]  Today's Date: 6/12/2024   Start Time: 1040  Stop Time: 1100  Time Calculation (min): 20    Impression:   Swallow treatment session to ensure diet tolerance completed. Pt demonstrated swallowing safety/efficiency w/ Soft/Bite-Sized (Level 6) Solids & Thin Liquids per MBSS completed 6/10. Received awake/alert and upright in bed for session; cooperative/able to follow commands throughout. Of note, pt w/ improved vocal quality and reduced dyspnea w/ speech this AM compared to previous sessions.     Trials of bite-sized solids moistened in purees (diamante crackers dipped in applesauce) and ~10 oz thin liquids were given. Normal oral management and no overt clinical s/s aspiration across all trials/consistencies; no coughing/choking or changes in respiratory status/vocal quality.     Continue to recommend  Soft/Bite-Sized (Level 6) Solids & Thin Liquids. Ensure all safe swallowing guidelines are followed during oral intake. SLP to continue to follow to ensure diet tolerance as pt appropriate/schedule permits. If pt presents with any changes to mental, medical, or respiratory status, please make NPO and alert SLP. MD aware.      Recommendations:  Soft/Bite-Sized (Level 6) Solids & Thin Liquids  Upright for all PO intake  Remain upright for 20-30 min after eating  Small bites/sips  Straws ok  Slow rate of consumption  Pills per pt preference  SLP to continue to follow to ensure diet tolerance as pt appropriate/schedule permits  If pt presents with any changes to mental, medical, or respiratory status, please make NPO and alert SLP    Goal:   Pt will tolerate least restrictive diet with no overt clinical s/s aspiration 100% of the time.        Plan:  SLP Services Indicated: Yes  Frequency: 2x week  Discussed POC with patient  SLP - OK to Discharge    Pain:   0-10  0 = No pain.     Inpatient Education:  Extensive  education provided to patient regarding current swallow function, recommendations/results, and POC.      Consultations/Referrals/Coordination of Services:   N/A

## 2024-06-12 NOTE — PROGRESS NOTES
Called and spoke to Cesar pt son [phone].  Informed him that his second choice which he give another SW yesterday [facility] is not able to take pt stating she has a high balance with them.  Checked pt insurance per our records pt has [employer] [facility] and Medicaid.  Informed Cesar he can call [facility] and see if they billed Medicaid for her balance.  He stated he thought she did not have Medicaid, he is not sure when she got it or if our info is correct.  He will check with the NH.  SW will confirm with our team to check to see if her Medicaid is active.  SW emailed him list of SNF to [email or web address] to get ather selections..  He wanted to check [facility] in [facility].  Facility was not on the list SW called the and was informed that they do not  not take pt insurance.  SW informed pt son of this. Per [facility] pt will not be ready till Monday 6/17/24.  ENIO will follow up on selections form son and work on discharge planning.    Gabriela MARIA, NATASHA-S  ([id number])

## 2024-06-12 NOTE — PROGRESS NOTES
"Lea Paulino is a 69 y.o. female on day 9 of admission presenting with Pyelonephritis.    Subjective   NAEO. Ms. Abraham slightly more alert and conversant this morning, though she remains disoriented and perseverates on certain responses when asked questions. Denies any current pain or SOB.        Objective     Physical Exam  Constitutional:       General: She is not in acute distress.     Comments: Patient awake and sitting up in bed    HENT:      Head: Normocephalic and atraumatic.      Nose: Nose normal.      Mouth/Throat:      Mouth: Mucous membranes are moist.      Pharynx: Oropharynx is clear. No posterior oropharyngeal erythema.   Eyes:      Extraocular Movements: Extraocular movements intact.      Conjunctiva/sclera: Conjunctivae normal.      Pupils: Pupils are equal, round, and reactive to light.   Cardiovascular:      Rate and Rhythm: Normal rate and regular rhythm.      Pulses: Normal pulses.      Heart sounds: Normal heart sounds.   Pulmonary:      Effort: Pulmonary effort is normal.      Breath sounds: Normal breath sounds.   Abdominal:      General: Abdomen is flat. There is no distension.      Palpations: Abdomen is soft.      Tenderness: There is no abdominal tenderness.   Musculoskeletal:         General: Normal range of motion.      Cervical back: Normal range of motion and neck supple.      Comments: +1 pitting edema of b/l upper and lower extremities, intervally improved   Skin:     General: Skin is warm and dry.      Findings: No lesion or rash.   Neurological:      Comments: More alert and conversant but inappropriate in responses. Oriented to self.        Last Recorded Vitals  Blood pressure 165/68, pulse 80, temperature 36.4 °C (97.5 °F), resp. rate 20, height 1.575 m (5' 2.01\"), weight 97.8 kg (215 lb 9.8 oz), SpO2 96%.  Intake/Output last 3 Shifts:  I/O last 3 completed shifts:  In: 1440 (14.7 mL/kg) [NG/GT:1090; IV Piggyback:350]  Out: 3950 (40.4 mL/kg) [Urine:3950 (1.1 " mL/kg/hr)]  Weight: 97.8 kg     Relevant Results  Results for orders placed or performed during the hospital encounter of 06/03/24 (from the past 24 hour(s))   POCT GLUCOSE   Result Value Ref Range    POCT Glucose 172 (H) 74 - 99 mg/dL   Renal function panel   Result Value Ref Range    Glucose 176 (H) 74 - 99 mg/dL    Sodium 148 (H) 136 - 145 mmol/L    Potassium 4.7 3.5 - 5.3 mmol/L    Chloride 102 98 - 107 mmol/L    Bicarbonate 39 (H) 21 - 32 mmol/L    Anion Gap 12 10 - 20 mmol/L    Urea Nitrogen 27 (H) 6 - 23 mg/dL    Creatinine 1.01 0.50 - 1.05 mg/dL    eGFR 61 >60 mL/min/1.73m*2    Calcium 8.7 8.6 - 10.6 mg/dL    Phosphorus 3.3 2.5 - 4.9 mg/dL    Albumin 2.5 (L) 3.4 - 5.0 g/dL   Heparin Assay, UFH   Result Value Ref Range    Heparin Unfractionated 0.3 See Comment Below for Therapeutic Ranges IU/mL   POCT GLUCOSE   Result Value Ref Range    POCT Glucose 176 (H) 74 - 99 mg/dL   CBC   Result Value Ref Range    WBC 10.0 4.4 - 11.3 x10*3/uL    nRBC 0.0 0.0 - 0.0 /100 WBCs    RBC 2.99 (L) 4.00 - 5.20 x10*6/uL    Hemoglobin 10.6 (L) 12.0 - 16.0 g/dL    Hematocrit 33.3 (L) 36.0 - 46.0 %     (H) 80 - 100 fL    MCH 35.5 (H) 26.0 - 34.0 pg    MCHC 31.8 (L) 32.0 - 36.0 g/dL    RDW 15.9 (H) 11.5 - 14.5 %    Platelets 142 (L) 150 - 450 x10*3/uL   POCT GLUCOSE   Result Value Ref Range    POCT Glucose 159 (H) 74 - 99 mg/dL   POCT GLUCOSE   Result Value Ref Range    POCT Glucose 170 (H) 74 - 99 mg/dL   Comprehensive metabolic panel   Result Value Ref Range    Glucose 186 (H) 74 - 99 mg/dL    Sodium 147 (H) 136 - 145 mmol/L    Potassium 4.6 3.5 - 5.3 mmol/L    Chloride 100 98 - 107 mmol/L    Bicarbonate 41 (HH) 21 - 32 mmol/L    Anion Gap 11 10 - 20 mmol/L    Urea Nitrogen 28 (H) 6 - 23 mg/dL    Creatinine 0.99 0.50 - 1.05 mg/dL    eGFR 62 >60 mL/min/1.73m*2    Calcium 9.8 8.6 - 10.6 mg/dL    Albumin 2.8 (L) 3.4 - 5.0 g/dL    Alkaline Phosphatase 124 33 - 136 U/L    Total Protein 5.2 (L) 6.4 - 8.2 g/dL    AST 40 (H) 9 - 39  U/L    Bilirubin, Total 0.6 0.0 - 1.2 mg/dL    ALT 32 7 - 45 U/L   Calcium, ionized   Result Value Ref Range    POCT Calcium, Ionized 1.33 1.1 - 1.33 mmol/L   Cyclosporine level   Result Value Ref Range    Cyclosporine 133 80 - 210 ng/mL   Magnesium   Result Value Ref Range    Magnesium 1.90 1.60 - 2.40 mg/dL   CBC and Auto Differential   Result Value Ref Range    WBC 10.6 4.4 - 11.3 x10*3/uL    nRBC 0.0 0.0 - 0.0 /100 WBCs    RBC 3.06 (L) 4.00 - 5.20 x10*6/uL    Hemoglobin 10.6 (L) 12.0 - 16.0 g/dL    Hematocrit 34.5 (L) 36.0 - 46.0 %     (H) 80 - 100 fL    MCH 34.6 (H) 26.0 - 34.0 pg    MCHC 30.7 (L) 32.0 - 36.0 g/dL    RDW 15.9 (H) 11.5 - 14.5 %    Platelets 152 150 - 450 x10*3/uL    Neutrophils % 84.3 40.0 - 80.0 %    Immature Granulocytes %, Automated 0.8 0.0 - 0.9 %    Lymphocytes % 5.1 13.0 - 44.0 %    Monocytes % 7.7 2.0 - 10.0 %    Eosinophils % 1.8 0.0 - 6.0 %    Basophils % 0.3 0.0 - 2.0 %    Neutrophils Absolute 8.94 (H) 1.20 - 7.70 x10*3/uL    Immature Granulocytes Absolute, Automated 0.09 0.00 - 0.70 x10*3/uL    Lymphocytes Absolute 0.54 (L) 1.20 - 4.80 x10*3/uL    Monocytes Absolute 0.82 0.10 - 1.00 x10*3/uL    Eosinophils Absolute 0.19 0.00 - 0.70 x10*3/uL    Basophils Absolute 0.03 0.00 - 0.10 x10*3/uL   Phosphorus   Result Value Ref Range    Phosphorus 3.3 2.5 - 4.9 mg/dL   POCT GLUCOSE   Result Value Ref Range    POCT Glucose 191 (H) 74 - 99 mg/dL   POCT GLUCOSE   Result Value Ref Range    POCT Glucose 174 (H) 74 - 99 mg/dL     Imaging:   STUDY:  MR BRAIN W AND WO IV CONTRAST;  6/11/2024 12:01 pm  IMPRESSION:  Suspected small areas of remote ischemic injury in the left  cerebellum. Mild white-matter changes are nonspecific but may  represent small-vessel ischemic disease in a patient of this age. No  evidence of acute ischemic injury or enhancing mass lesion.       This patient has a urinary catheter   Reason for the urinary catheter remaining today? critically ill patient who need accurate  urinary output measurements. Pending improvement in mental status, can consider voiding trial tomorrow, 6/13       Assessment/Plan   Principal Problem:    Pyelonephritis    Lea Paulino is a 69 y.o. female with a history of COPD, renal transplant in 2015 on cyclosporine, mycophenolate and prednisone, HTN, HLD, hypothyroid, hyperparathyroid, HFmEF (45-50%), AFib, PVD, lymphedema, admitted for emphysematous pyelonephritis and acute pancreatitis in the setting of severe hypercalcemia. Initially treated with IV fluids and abx. Transferred to MICU 6/4 for AHRF s/t pulmonary edema and intubated 6/4-6/5 for airway protection. Extubated 6/5 and transferred back to floor. Hospital course further complicated by hypoglycemia, hypernatremia, and prolonged metabolic encephalopathy. Transplant ID and nephrology following patient.     Updates 6/12:  -Mental status slightly  improved. MRI unremarkable for acute findings, preliminary EEG read negative for epileptic activity. Overall suspect metabolic encephalopathy as underlying cause of AMS.   -Discussed case with son Cesar yesterday, who reports that Ms. Abraham has had similar episodes of prolonged encephalopathy following infection and hospitalization in the past, which has sometimes taken weeks to resolve. Additionally notes that she has some baseline cognitive impairment and delayed response time  -Had significant response to diuresis yesterday, now with increasing contraction alkalosis; will hold further diuresis today and re-assess tomorrow, consider standing oral  maintenance dose  -BG remains stable on tube feeds. Passed repeat bedside swallow test with speech this morning  -Hypernatremia unchanged at 147; will increase free water flushes to 350 mL/hr  -Continue meropenem through 6/17 per ID recs  -Social work following, currently working with patient's son for SNF placement    #Lethargy  #Acute encephalopathy   #Baseline cognitive impairment  -Suspected to be  multifactorial in setting of hypoglycemia + hypernatremia+ delirium + infection +/- hypoxia  -MRI 6/11 with no acute findings, spot EEG negative for epileptiform activity  -HSV PCR negative  -DHT placed 6/8, Tfs currently at goal. Passed MBS 6/10 but nursing instructed not to allow PO intake if altered  -Mental status marginally improved, patient cleared repeat bedside swallow test with speech     #Emphysematous pyelonephritis  :: CT A/P with gas in the RLQ transplant kidney collecting system and in the urinary bladder, possible gas-forming infection  :: s/p vanc/zosyn and meropenem/gentamicin at OSH, vancomycin 6/3-6/8  :: previously grew pan-sensitive Pseudomonas (4/24) and E. faecalis (resistant to gentamicin, intermediate to doxycycline 1/22)  -Bcx 6/3, repeat blood cx (6/7) NGD4, urine cx (6/3, 6/6) negative  -ID consulted, since signed off  -Continue meropenem 6/3-6/17 (total 14 day course) per ID recs    #AHRF  :: CXR w/ right pleural effusion with adjacent atelectasis/consolidation and interval development of a small left pleural effusion with adjacent atelectasis  -on arivo overnight 6/7, weaned to 4L NC  -O2 sats in upper 90s on 4L NC, will wean as able  -Holding further diuresis today, will continue to re-assess on daily basis     #LAURA on CKD of transplanted kidney (2015), improving  -Transplant nephrology following, appreciate recs  -LAURA largely resolved (Cr 0.99 from 1.7 on admission), suspect prerenal etiology in setting of poor PO intake and cardiorenal syndrome, given improvement with diuresis  -Continue prednisone 5 mg daily,   BID (home dose 1 g daily), cyclosporine 75 mg BID (home dose 100 mg BID)  -CMV titer undetected, EBV detected, BK detected viral load 28    #Metabolic alkalosis  -Suspect contraction alkalosis in setting of heavy diuresis  -Holding further diuresis today, will reassess tomorrow and consider starting maintenance oral diuretic    #Hypercalcemia,  resolved  #Hypocalcemia-resolved  :: ddx primary hyperparathyroidism w/ remaining parathyroid tissue vs less likely MEN1 given possible recurrent, significant hypoglycemia concerning for insulinoma  :: s/p subtotal hemithyroidectomy & parathyroidectomy in 2006   :: Calcium 15.9 at [facility], treated with 4 units/kg calcitonin and IV NS but no bisphosphonate, repeat iCa subsequently fell below normal limits on 6/9. Now WNL (1.22) on repeat 6/10  - iPTH appropriately low on admission, repeat elevated at 204  -PTHrP, Vitamin D 25 and D-1,25 levels WNL, pending SPEP, UPEP   -Holding home calcitriol, continue to trend daily iCal, phos, and Mg  -Endocrine consulted, now signed off     #Hypernatremia  -147 (6/9)-> 143 (6/10)-> 147 (6/11)-> 147 (6/12)  -Will increase FWF to 350 ml q6h     #Acute pancreatitis, resolved  :: likely secondary to hypercalcemia (15.9)   :: Lipase 1039, CT findings supportive, low back pain initial presentation    - triglycerides obtained- 82  -Repeat CT in a few weeks regarding fluid collections     #Reported nonsustained VT at [facility]  #Hypertension  #HFmrEF (LVEF 40-45% 6/4/24)  - Home metoprolol tartrate 12.5 BID switched to coreg 6.25 mg BID  -Continue close electrolyte monitoring and repletion     #Bipolar Disorder  -Psych consulted   -resume home desvenlafaxine 100mg  -start home clozapine at 25 mg at night, starting 6/7 increase to home dose of 50 mg      #Afib  #Hx of DVT LLE 2022  -c/w coreg 6.25 mg BID  -Continue home apixaban     #Hypothyroidism  - Continue levothyroxine     F: diuresing  E: replete prn, monitor Ca  N: regular, Tfs at 45 ml/hr   A: PIV  DVT ppx: apixaban      Code status: full  NOK: Son Cesar- [phone]              Belkis Paulino MD

## 2024-06-12 NOTE — CARE PLAN
Problem: Pain  Goal: My pain/discomfort is manageable  Outcome: Progressing     Problem: Safety  Goal: Patient will be injury free during hospitalization  Outcome: Progressing  Goal: I will remain free of falls  Outcome: Progressing     Problem: Daily Care  Goal: Daily care needs are met  Outcome: Progressing     Problem: Psychosocial Needs  Goal: Demonstrates ability to cope with hospitalization/illness  Outcome: Progressing  Goal: Collaborate with me, my family, and caregiver to identify my specific goals  Outcome: Progressing     Problem: Discharge Barriers  Goal: My discharge needs are met  Outcome: Progressing     Problem: Skin  Goal: Participates in plan/prevention/treatment measures  Outcome: Progressing  Goal: Prevent/manage excess moisture  Outcome: Progressing  Goal: Prevent/minimize sheer/friction injuries  Outcome: Progressing  Goal: Promote/optimize nutrition  Outcome: Progressing  Flowsheets (Taken 6/12/2024 3838)  Promote/optimize nutrition:   Offer water/supplements/favorite foods   Consume > 50% meals/supplements   Monitor/record intake including meals  Goal: Promote skin healing  Outcome: Progressing     Problem: Respiratory  Goal: Clear secretions with interventions this shift  Outcome: Progressing  Goal: Minimize anxiety/maximize coping throughout shift  Outcome: Progressing  Goal: Minimal/no exertional discomfort or dyspnea this shift  Outcome: Progressing  Goal: No signs of respiratory distress (eg. Use of accessory muscles. Peds grunting)  Outcome: Progressing  Goal: Patent airway maintained this shift  Outcome: Progressing  Goal: Tolerate mechanical ventilation evidenced by VS/agitation level this shift  Outcome: Progressing  Goal: Tolerate pulmonary toileting this shift  Outcome: Progressing  Goal: Verbalize decreased shortness of breath this shift  Outcome: Progressing  Goal: Wean oxygen to maintain O2 saturation per order/standard this shift  Outcome: Progressing  Goal: Increase self  care and/or family involvement in next 24 hours  Outcome: Progressing   The patient's goals for the shift include      The clinical goals for the shift include Patient will be free from falls and injury during this shift    Over the shift, the patient did not make progress toward the following goals. Barriers to progression include ***. Recommendations to address these barriers include ***.

## 2024-06-13 ENCOUNTER — APPOINTMENT (OUTPATIENT)
Dept: RADIOLOGY | Facility: HOSPITAL | Age: 69
End: 2024-06-13
Payer: MEDICARE

## 2024-06-13 LAB
ALBUMIN SERPL BCP-MCNC: 2.6 G/DL (ref 3.4–5)
ALP SERPL-CCNC: 112 U/L (ref 33–136)
ALT SERPL W P-5'-P-CCNC: 32 U/L (ref 7–45)
AMYLASE SERPL-CCNC: 418 U/L (ref 29–103)
ANION GAP SERPL CALC-SCNC: 13 MMOL/L (ref 10–20)
AST SERPL W P-5'-P-CCNC: 27 U/L (ref 9–39)
BASOPHILS # BLD AUTO: 0.04 X10*3/UL (ref 0–0.1)
BASOPHILS NFR BLD AUTO: 0.4 %
BILIRUB SERPL-MCNC: 0.7 MG/DL (ref 0–1.2)
BUN SERPL-MCNC: 26 MG/DL (ref 6–23)
CA-I BLD-SCNC: 1.35 MMOL/L (ref 1.1–1.33)
CALCIUM SERPL-MCNC: 9.6 MG/DL (ref 8.6–10.6)
CHLORIDE SERPL-SCNC: 100 MMOL/L (ref 98–107)
CO2 SERPL-SCNC: 38 MMOL/L (ref 21–32)
CREAT SERPL-MCNC: 0.9 MG/DL (ref 0.5–1.05)
CYCLOSPORINE BLD IA-MCNC: 109 NG/ML (ref 80–210)
EGFRCR SERPLBLD CKD-EPI 2021: 70 ML/MIN/1.73M*2
EOSINOPHIL # BLD AUTO: 0.22 X10*3/UL (ref 0–0.7)
EOSINOPHIL NFR BLD AUTO: 2.2 %
ERYTHROCYTE [DISTWIDTH] IN BLOOD BY AUTOMATED COUNT: 15.7 % (ref 11.5–14.5)
GLUCOSE BLD MANUAL STRIP-MCNC: 159 MG/DL (ref 74–99)
GLUCOSE BLD MANUAL STRIP-MCNC: 206 MG/DL (ref 74–99)
GLUCOSE BLD MANUAL STRIP-MCNC: 217 MG/DL (ref 74–99)
GLUCOSE BLD MANUAL STRIP-MCNC: 218 MG/DL (ref 74–99)
GLUCOSE BLD MANUAL STRIP-MCNC: 228 MG/DL (ref 74–99)
GLUCOSE BLD MANUAL STRIP-MCNC: 238 MG/DL (ref 74–99)
GLUCOSE SERPL-MCNC: 218 MG/DL (ref 74–99)
HCT VFR BLD AUTO: 34.9 % (ref 36–46)
HGB BLD-MCNC: 10.5 G/DL (ref 12–16)
IMM GRANULOCYTES # BLD AUTO: 0.09 X10*3/UL (ref 0–0.7)
IMM GRANULOCYTES NFR BLD AUTO: 0.9 % (ref 0–0.9)
LIPASE SERPL-CCNC: 859 U/L (ref 9–82)
LYMPHOCYTES # BLD AUTO: 0.65 X10*3/UL (ref 1.2–4.8)
LYMPHOCYTES NFR BLD AUTO: 6.5 %
MAGNESIUM SERPL-MCNC: 1.65 MG/DL (ref 1.6–2.4)
MCH RBC QN AUTO: 34.7 PG (ref 26–34)
MCHC RBC AUTO-ENTMCNC: 30.1 G/DL (ref 32–36)
MCV RBC AUTO: 115 FL (ref 80–100)
MONOCYTES # BLD AUTO: 0.83 X10*3/UL (ref 0.1–1)
MONOCYTES NFR BLD AUTO: 8.4 %
NEUTROPHILS # BLD AUTO: 8.11 X10*3/UL (ref 1.2–7.7)
NEUTROPHILS NFR BLD AUTO: 81.6 %
NRBC BLD-RTO: 0 /100 WBCS (ref 0–0)
PHOSPHATE SERPL-MCNC: 2.3 MG/DL (ref 2.5–4.9)
PLATELET # BLD AUTO: 159 X10*3/UL (ref 150–450)
POTASSIUM SERPL-SCNC: 5.1 MMOL/L (ref 3.5–5.3)
PROT SERPL-MCNC: 4.3 G/DL (ref 6.4–8.2)
RBC # BLD AUTO: 3.03 X10*6/UL (ref 4–5.2)
SODIUM SERPL-SCNC: 146 MMOL/L (ref 136–145)
WBC # BLD AUTO: 9.9 X10*3/UL (ref 4.4–11.3)

## 2024-06-13 PROCEDURE — 2500000002 HC RX 250 W HCPCS SELF ADMINISTERED DRUGS (ALT 637 FOR MEDICARE OP, ALT 636 FOR OP/ED)

## 2024-06-13 PROCEDURE — 82330 ASSAY OF CALCIUM: CPT

## 2024-06-13 PROCEDURE — 80158 DRUG ASSAY CYCLOSPORINE: CPT | Performed by: INTERNAL MEDICINE

## 2024-06-13 PROCEDURE — 99233 SBSQ HOSP IP/OBS HIGH 50: CPT | Performed by: INTERNAL MEDICINE

## 2024-06-13 PROCEDURE — 2500000004 HC RX 250 GENERAL PHARMACY W/ HCPCS (ALT 636 FOR OP/ED)

## 2024-06-13 PROCEDURE — 2500000005 HC RX 250 GENERAL PHARMACY W/O HCPCS

## 2024-06-13 PROCEDURE — 83690 ASSAY OF LIPASE: CPT | Performed by: INTERNAL MEDICINE

## 2024-06-13 PROCEDURE — 2500000001 HC RX 250 WO HCPCS SELF ADMINISTERED DRUGS (ALT 637 FOR MEDICARE OP)

## 2024-06-13 PROCEDURE — 82947 ASSAY GLUCOSE BLOOD QUANT: CPT | Mod: 91

## 2024-06-13 PROCEDURE — 84075 ASSAY ALKALINE PHOSPHATASE: CPT

## 2024-06-13 PROCEDURE — A4217 STERILE WATER/SALINE, 500 ML: HCPCS

## 2024-06-13 PROCEDURE — 74018 RADEX ABDOMEN 1 VIEW: CPT | Performed by: RADIOLOGY

## 2024-06-13 PROCEDURE — 82150 ASSAY OF AMYLASE: CPT | Performed by: INTERNAL MEDICINE

## 2024-06-13 PROCEDURE — 97112 NEUROMUSCULAR REEDUCATION: CPT | Mod: GP | Performed by: PHYSICAL THERAPIST

## 2024-06-13 PROCEDURE — 99233 SBSQ HOSP IP/OBS HIGH 50: CPT

## 2024-06-13 PROCEDURE — 85025 COMPLETE CBC W/AUTO DIFF WBC: CPT

## 2024-06-13 PROCEDURE — 84100 ASSAY OF PHOSPHORUS: CPT

## 2024-06-13 PROCEDURE — 83735 ASSAY OF MAGNESIUM: CPT

## 2024-06-13 PROCEDURE — 1200000002 HC GENERAL ROOM WITH TELEMETRY DAILY

## 2024-06-13 PROCEDURE — 74018 RADEX ABDOMEN 1 VIEW: CPT

## 2024-06-13 PROCEDURE — 97530 THERAPEUTIC ACTIVITIES: CPT | Mod: GO | Performed by: OCCUPATIONAL THERAPIST

## 2024-06-13 RX ORDER — MAGNESIUM SULFATE HEPTAHYDRATE 40 MG/ML
2 INJECTION, SOLUTION INTRAVENOUS ONCE
Status: COMPLETED | OUTPATIENT
Start: 2024-06-13 | End: 2024-06-13

## 2024-06-13 RX ORDER — SODIUM,POTASSIUM PHOSPHATES 280-250MG
1 POWDER IN PACKET (EA) ORAL 4 TIMES DAILY
Status: DISPENSED | OUTPATIENT
Start: 2024-06-13 | End: 2024-06-13

## 2024-06-13 RX ADMIN — MYCOPHENOLATE MOFETIL 500 MG: 200 POWDER, FOR SUSPENSION ORAL at 06:27

## 2024-06-13 RX ADMIN — CYCLOSPORINE 75 MG: 100 SOLUTION ORAL at 06:27

## 2024-06-13 RX ADMIN — APIXABAN 5 MG: 5 TABLET, FILM COATED ORAL at 09:21

## 2024-06-13 RX ADMIN — CYCLOSPORINE 75 MG: 100 SOLUTION ORAL at 17:48

## 2024-06-13 RX ADMIN — LEVOTHYROXINE SODIUM 50 MCG: 0.05 TABLET ORAL at 06:26

## 2024-06-13 RX ADMIN — SENNOSIDES AND DOCUSATE SODIUM 2 TABLET: 50; 8.6 TABLET ORAL at 09:20

## 2024-06-13 RX ADMIN — MAGNESIUM SULFATE HEPTAHYDRATE 2 G: 40 INJECTION, SOLUTION INTRAVENOUS at 09:36

## 2024-06-13 RX ADMIN — APIXABAN 5 MG: 5 TABLET, FILM COATED ORAL at 20:53

## 2024-06-13 RX ADMIN — MEROPENEM 1 G: 1 INJECTION, POWDER, FOR SOLUTION INTRAVENOUS at 20:53

## 2024-06-13 RX ADMIN — PREDNISONE 5 MG: 5 TABLET ORAL at 09:21

## 2024-06-13 RX ADMIN — MYCOPHENOLATE MOFETIL 500 MG: 200 POWDER, FOR SUSPENSION ORAL at 18:30

## 2024-06-13 RX ADMIN — Medication 2 L/MIN: at 20:00

## 2024-06-13 RX ADMIN — MEROPENEM 1 G: 1 INJECTION, POWDER, FOR SOLUTION INTRAVENOUS at 12:08

## 2024-06-13 RX ADMIN — POTASSIUM & SODIUM PHOSPHATES POWDER PACK 280-160-250 MG 1 PACKET: 280-160-250 PACK at 09:22

## 2024-06-13 RX ADMIN — MEROPENEM 1 G: 1 INJECTION, POWDER, FOR SOLUTION INTRAVENOUS at 03:47

## 2024-06-13 ASSESSMENT — COGNITIVE AND FUNCTIONAL STATUS - GENERAL
MOVING FROM LYING ON BACK TO SITTING ON SIDE OF FLAT BED WITH BEDRAILS: TOTAL
PERSONAL GROOMING: TOTAL
EATING MEALS: TOTAL
MOVING TO AND FROM BED TO CHAIR: TOTAL
DAILY ACTIVITIY SCORE: 6
CLIMB 3 TO 5 STEPS WITH RAILING: TOTAL
PERSONAL GROOMING: TOTAL
STANDING UP FROM CHAIR USING ARMS: TOTAL
MOBILITY SCORE: 6
DRESSING REGULAR UPPER BODY CLOTHING: TOTAL
STANDING UP FROM CHAIR USING ARMS: TOTAL
DRESSING REGULAR LOWER BODY CLOTHING: TOTAL
TOILETING: TOTAL
HELP NEEDED FOR BATHING: TOTAL
HELP NEEDED FOR BATHING: TOTAL
TOILETING: TOTAL
DAILY ACTIVITIY SCORE: 6
EATING MEALS: TOTAL
TURNING FROM BACK TO SIDE WHILE IN FLAT BAD: TOTAL
WALKING IN HOSPITAL ROOM: TOTAL
MOBILITY SCORE: 6
TURNING FROM BACK TO SIDE WHILE IN FLAT BAD: TOTAL
CLIMB 3 TO 5 STEPS WITH RAILING: TOTAL
MOVING FROM LYING ON BACK TO SITTING ON SIDE OF FLAT BED WITH BEDRAILS: TOTAL
DRESSING REGULAR LOWER BODY CLOTHING: TOTAL
MOVING TO AND FROM BED TO CHAIR: TOTAL
WALKING IN HOSPITAL ROOM: TOTAL
DRESSING REGULAR UPPER BODY CLOTHING: TOTAL

## 2024-06-13 ASSESSMENT — PAIN SCALES - PAIN ASSESSMENT IN ADVANCED DEMENTIA (PAINAD)
BODYLANGUAGE: RELAXED
BREATHING: NORMAL
TOTALSCORE: 0
CONSOLABILITY: NO NEED TO CONSOLE
FACIALEXPRESSION: SMILING OR INEXPRESSIVE

## 2024-06-13 ASSESSMENT — PAIN - FUNCTIONAL ASSESSMENT: PAIN_FUNCTIONAL_ASSESSMENT: PAINAD (PAIN ASSESSMENT IN ADVANCED DEMENTIA SCALE)

## 2024-06-13 ASSESSMENT — PAIN SCALES - GENERAL: PAINLEVEL_OUTOF10: 0 - NO PAIN

## 2024-06-13 NOTE — CARE PLAN
The patient's goals for the shift include      The clinical goals for the shift include pt. will remain free of injury.

## 2024-06-13 NOTE — PROGRESS NOTES
Occupational Therapy    OT Treatment    Patient Name: Lea Paulino  MRN: [id number]  Today's Date: 6/13/2024  Time Calculation  Start Time: 1328  Stop Time: 1347  Time Calculation (min): 19 min         Assessment:  Evaluation/Treatment Tolerance:  (Limited by fatigue and nausea)  Medical Staff Made Aware: Yes  End of Session Communication: Bedside nurse  End of Session Patient Position: Bed, 3 rail up, Alarm on  Evaluation/Treatment Tolerance:  (Limited by fatigue and nausea)  Medical Staff Made Aware: Yes  Plan:  Treatment Interventions: ADL retraining, Endurance training, Patient/family training, Functional transfer training, UE strengthening/ROM, Equipment evaluation/education, Neuromuscular reeducation, Compensatory technique education  OT Frequency: 3 times per week  OT Discharge Recommendations: Moderate intensity level of continued care  OT - OK to Discharge: Yes  Treatment Interventions: ADL retraining, Endurance training, Patient/family training, Functional transfer training, UE strengthening/ROM, Equipment evaluation/education, Neuromuscular reeducation, Compensatory technique education    Subjective   Previous Visit Info:  OT Last Visit  OT Received On: 06/13/24  General:  General  Co-Treatment: PT  Co-Treatment Reason: to maximize pt safety and therapeutic potential, focusing on OT specific goals, in pt requiring 2 skilled A  Prior to Session Communication: Bedside nurse  Patient Position Received: Bed, 3 rail up, Alarm on  General Comment: Met in bed, cooperative, lethargic with garbled speech however pleasant, did endorse nausea with mobility  Precautions:  Medical Precautions: Fall precautions, Oxygen therapy device and L/min  Vital Signs:  Vital Signs  BP: 136/78  Pain:  Pain Assessment  Pain Assessment:  (pt did not demonstrate outward signs of pain)    Objective    Cognition:  Cognition  Overall Cognitive Status: Impaired  Arousal/Alertness: Appropriate responses to stimuli  Orientation  "Level: Disoriented to place, Disoriented to time, Disoriented to situation, Disoriented to person (unable to correctly state birthdate or last name, states [facility] however not able to state \"hosptial\")  Following Commands:  (followed 50-75% of simple 1 step commands)  Problem Solving: Assistance required to identify errors made  Cognition Comments: pleasantly confused  Coordination:     Activities of Daily Living: Feeding  Feeding Level of Assistance: Dependent  Feeding Where Assessed:  (tube feed)    Grooming  Grooming Level of Assistance: Maximum assistance  Grooming Where Assessed: Edge of bed  Grooming Comments: hair care with A of another for sitting balance       LE Dressing  LE Dressing: Yes  Sock Level of Assistance: Dependent  LE Dressing Where Assessed: Bed level  LE Dressing Comments: donning/doffing B socks    Toileting  Toileting Level of Assistance: Dependent  Where Assessed: Bed level  Toileting Comments: jimenez; total A sidelying with A of one and another to perform joya-hygiene following incontinence of bowel     Bed Mobility/Transfers: Bed Mobility  Bed Mobility: Yes  Bed Mobility 1  Bed Mobility 1: Rolling right, Rolling left  Level of Assistance 1: Maximum assistance, Moderate verbal cues, Moderate tactile cues  Bed Mobility 2  Bed Mobility  2: Supine to sitting, Sitting to supine  Level of Assistance 2: Dependent, +2  Bed Mobility 3  Bed Mobility 3: Scooting (EOB)  Level of Assistance 3: Maximum assistance  Bed Mobility Comments 3: draw assist  Bed Mobility 4  Bed Mobility 4: Scooting (for boost/positioning)  Level of Assistance 4: Dependent, +2  Bed Mobility Comments 4: draw and gravity assist    Transfers  Transfer: No    Functional Mobility:  Functional Mobility  Functional Mobility Performed: No       Outcome Measures:[facility] Daily Activity  Putting on and taking off regular lower body clothing: Total  Bathing (including washing, rinsing, drying): Total  Putting on and taking off " regular upper body clothing: Total  Toileting, which includes using toilet, bedpan or urinal: Total  Taking care of personal grooming such as brushing teeth: Total  Eating Meals: Total  Daily Activity - Total Score: 6        Education Documentation  Body Mechanics, taught by Ruby Carmona OT at 6/13/2024  2:18 PM.  Learner: Patient  Readiness: Acceptance  Method: Explanation  Response: No Evidence of Learning, Needs Reinforcement    ADL Training, taught by Ruby Carmona OT at 6/13/2024  2:18 PM.  Learner: Patient  Readiness: Acceptance  Method: Explanation  Response: No Evidence of Learning, Needs Reinforcement    Education Comments  No comments found.      Goals:  Encounter Problems       Encounter Problems (Active)       ADLs       Patient with complete upper body dressing with supervision level of assistance donning and doffing all UE clothes with no adaptive equipment. (Progressing)       Start:  06/06/24    Expected End:  06/20/24            Patient with complete lower body dressing with minimal assist  level of assistance donning and doffing all LE clothes  with PRN adaptive equipment. (Progressing)       Start:  06/06/24    Expected End:  06/20/24            Patient will feed self with independent and set-up level of assistance using PRN adaptive equipment. (Progressing)       Start:  06/06/24    Expected End:  06/20/24            Patient will complete daily grooming tasks with supervision level of assistance and PRN adaptive equipment while edge of bed . (Progressing)       Start:  06/06/24    Expected End:  06/20/24            Patient will complete toileting including hygiene clothing management/hygiene with contact guard assist level of assistance. (Progressing)       Start:  06/06/24    Expected End:  06/20/24               BALANCE       Pt will maintain dynamic standing balance during ADL task with minimal assist  level of assistance in order to demonstrate decreased risk of falling and improved  postural control. (Progressing)       Start:  06/06/24    Expected End:  06/20/24               EXERCISE/STRENGTHENING       Patient will complete BUE exercises in order to improve strength and activity tolerance for ADL performance.  (Progressing)       Start:  06/06/24    Expected End:  06/20/24               MOBILITY       Patient will perform Functional mobility Household distances/Community Distances with minimal assist  level of assistance and front wheeled walker in order to improve safety and functional mobility. (Progressing)       Start:  06/06/24    Expected End:  06/20/24               TRANSFERS       Patient will perform bed mobility minimal assist  level of assistance in order to improve safety and independence with mobility (Progressing)       Start:  06/06/24    Expected End:  06/20/24            Patient will complete functional transfers with front wheeled walker with minimal assist level of assistance. (Progressing)       Start:  06/06/24    Expected End:  06/20/24 06/13/24 at 2:26 PM   Ruby Carmona OT   Rehab Office: [phone]

## 2024-06-13 NOTE — PROGRESS NOTES
"        INPATIENT TRANSPLANT NEPHROLOGY PROGRESS NOTE          REASON FOR CONSULT:  Immunosuppressive medication management and nephrology related issues.    SUBJECTION:     No events overnight.   Continue to HOLD diuresis   Ml yesterday and this am 2.1 L off diuretics.  HCO3 , down to 38    PHYCISCAL EXAMINATION:    Visit Vitals  /87   Pulse 84   Temp 36.5 °C (97.7 °F)   Resp 17   Ht 1.575 m (5' 2.01\")   Wt 97.8 kg (215 lb 9.8 oz)   SpO2 96%   BMI 39.43 kg/m²   Smoking Status Unknown   BSA 2.07 m²        06/11 1900 - 06/13 0659  In: 1530   Out: 2150 [Urine:2150]     Weight change:     General Appearance - NAD, Good speech, oriented and alert  HEENT - Supple. Not pale. No jaundice. No cervical lymphadenopathy. Pharynx and tonsils are not injected.  CVS - RRR. Normal S1/S2. No murmur, click , rub or gallop  Lungs- clear to auscultation bilaterally  Abdomen - soft , not tender, no guarding, no rigidity. No hepatosplenomegaly. Normal bowel sounds. No masses and ascites. S/P Kidney transplant .  Transplanted kidney is not tender.   Musculoskeletal /Extremities - no edema. Full ROM. No joint tenderness.   Neuro/Psych - appropriate mood and affect. Motor power V/V all extremities. CN I -XII were grossly intact.  Skin - No visible rash    MEDICATION LIST: REVIEWED    apixaban, 5 mg, q12h  [Held by provider] carvedilol, 6.25 mg, BID  cycloSPORINE, 75 mg, q12h MARTIN  desvenlafaxine succinate, 100 mg, Daily  levothyroxine, 50 mcg, q AM  lidocaine, 5 mL, Once  meropenem, 1 g, q8h  mycophenolate, 500 mg, BID  oxygen, , Continuous - Inhalation  polyethylene glycol, 17 g, Daily  potassium, sodium phosphates, 1 packet, 4x daily  predniSONE, 5 mg, Daily  sennosides-docusate sodium, 2 tablet, BID           alteplase, 2 mg, PRN  dextrose, 12.5 g, q15 min PRN  dextrose, 25 g, q15 min PRN  glucagon, 1 mg, q15 min PRN  glucagon, 1 mg, q15 min PRN  sodium chloride, 1 spray, 4x daily PRN        ALLERGY:  Allergies   Allergen " Reactions    Macrodantin [Nitrofurantoin Macrocrystal] GI Upset    Sulfa (Sulfonamide Antibiotics) Rash       LABS:  Results for orders placed or performed during the hospital encounter of 06/03/24 (from the past 24 hour(s))   POCT GLUCOSE   Result Value Ref Range    POCT Glucose 188 (H) 74 - 99 mg/dL   POCT GLUCOSE   Result Value Ref Range    POCT Glucose 172 (H) 74 - 99 mg/dL   POCT GLUCOSE   Result Value Ref Range    POCT Glucose 206 (H) 74 - 99 mg/dL   Comprehensive metabolic panel   Result Value Ref Range    Glucose 218 (H) 74 - 99 mg/dL    Sodium 146 (H) 136 - 145 mmol/L    Potassium 5.1 3.5 - 5.3 mmol/L    Chloride 100 98 - 107 mmol/L    Bicarbonate 38 (H) 21 - 32 mmol/L    Anion Gap 13 10 - 20 mmol/L    Urea Nitrogen 26 (H) 6 - 23 mg/dL    Creatinine 0.90 0.50 - 1.05 mg/dL    eGFR 70 >60 mL/min/1.73m*2    Calcium 9.6 8.6 - 10.6 mg/dL    Albumin 2.6 (L) 3.4 - 5.0 g/dL    Alkaline Phosphatase 112 33 - 136 U/L    Total Protein 4.3 (L) 6.4 - 8.2 g/dL    AST 27 9 - 39 U/L    Bilirubin, Total 0.7 0.0 - 1.2 mg/dL    ALT 32 7 - 45 U/L   Calcium, ionized   Result Value Ref Range    POCT Calcium, Ionized 1.35 (H) 1.1 - 1.33 mmol/L   Cyclosporine level   Result Value Ref Range    Cyclosporine 109 80 - 210 ng/mL   Magnesium   Result Value Ref Range    Magnesium 1.65 1.60 - 2.40 mg/dL   CBC and Auto Differential   Result Value Ref Range    WBC 9.9 4.4 - 11.3 x10*3/uL    nRBC 0.0 0.0 - 0.0 /100 WBCs    RBC 3.03 (L) 4.00 - 5.20 x10*6/uL    Hemoglobin 10.5 (L) 12.0 - 16.0 g/dL    Hematocrit 34.9 (L) 36.0 - 46.0 %     (H) 80 - 100 fL    MCH 34.7 (H) 26.0 - 34.0 pg    MCHC 30.1 (L) 32.0 - 36.0 g/dL    RDW 15.7 (H) 11.5 - 14.5 %    Platelets 159 150 - 450 x10*3/uL    Neutrophils % 81.6 40.0 - 80.0 %    Immature Granulocytes %, Automated 0.9 0.0 - 0.9 %    Lymphocytes % 6.5 13.0 - 44.0 %    Monocytes % 8.4 2.0 - 10.0 %    Eosinophils % 2.2 0.0 - 6.0 %    Basophils % 0.4 0.0 - 2.0 %    Neutrophils Absolute 8.11 (H) 1.20 -  7.70 x10*3/uL    Immature Granulocytes Absolute, Automated 0.09 0.00 - 0.70 x10*3/uL    Lymphocytes Absolute 0.65 (L) 1.20 - 4.80 x10*3/uL    Monocytes Absolute 0.83 0.10 - 1.00 x10*3/uL    Eosinophils Absolute 0.22 0.00 - 0.70 x10*3/uL    Basophils Absolute 0.04 0.00 - 0.10 x10*3/uL   Lipase   Result Value Ref Range    Lipase 859 (H) 9 - 82 U/L   Amylase   Result Value Ref Range    Amylase 418 (H) 29 - 103 U/L   Phosphorus   Result Value Ref Range    Phosphorus 2.3 (L) 2.5 - 4.9 mg/dL   POCT GLUCOSE   Result Value Ref Range    POCT Glucose 217 (H) 74 - 99 mg/dL   POCT GLUCOSE   Result Value Ref Range    POCT Glucose 218 (H) 74 - 99 mg/dL   POCT GLUCOSE   Result Value Ref Range    POCT Glucose 238 (H) 74 - 99 mg/dL        ASSESSMENT AND PLAN:    69 y.o. 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 Transferred to [facility] on 6/2. ID consulted for emphysematous pyelonephritis.     Transplant nephrology is consulted to assist with immunosuppressive medication management and nephrology related issues.        1. LAURA S/P Kidney transplant. Due to UTI, ATN, pancreatitis  Lab Results   Component Value Date    CREATININE 0.90 06/13/2024        - Renal allograft function: STABLE    -Echocardiogram with EF 40-45% and NORMAL RVSP.    -hold diuretics  -Monitor K/Mg/phos. Replace prn.     - Continue to monitor UOP and Serum creatinine closely.   - Avoid nephrotoxic agents, NSAIDs and IV contrast   - Strict I/O.   - Renally dose all medications by the most recent CrCl from Cockcroft-Gault formula.    2. Immunosuppression   - continue current  immunosuppression   - Monitor cyclosporine trough level before am dose is given. Target CSA trough level at 12 hour ~  - Continue cyclosporine at current dose. Level today = 109 ; continue same dose  -MMF suspension 500 mg bid  - Prednisone 5 mg daily    3. Anemia and WBC   Lab Results   Component Value Date    WBC 9.9 06/13/2024    HGB 10.5 (L) 06/13/2024    HCT 34.9 (L) 06/13/2024     (H) 06/13/2024     06/13/2024     -Continue to monitor Hgb   -No indications for PRBC transfusion     4. Electrolyte   Lab Results   Component Value Date    GLUCOSE 218 (H) 06/13/2024    CALCIUM 9.6 06/13/2024     (H) 06/13/2024    K 5.1 06/13/2024    CO2 38 (H) 06/13/2024     06/13/2024    BUN 26 (H) 06/13/2024    CREATININE 0.90 06/13/2024     - Reviewed renal profile.   - Increase Free H2O 300 ml q 4 hours  - Already on low sodium formula    6. Hypertension   Blood Pressures         6/12/2024  2157 6/13/2024  0008 6/13/2024  0533 6/13/2024  0821 6/13/2024  1159    BP: 136/79 129/84 154/80 159/85 164/87          -Goal BP < 140/90 mmHg   -continue current management     7. UTI /Emphysematous pyelonephritis   Defer to ID  On meropenem till 6/17 per ID    8.  GI prophylaxis   - On PPI     9. DVT Prophylaxis  -Defer to primary team    10. Pancreatitis  Lab Results   Component Value Date    AMYLASE 418 (H) 06/13/2024       Lab Results   Component Value Date    LIPASE 859 (H) 06/13/2024     - Less likely from hypercalcemia. Need to rule out other causes  -defer to primary team.      11. AMS  -MRI  -Defer to primary team    * Case was discussed with primary team.  For questions, please contact transplant nephrology page x [phone]    Deidra Robb    Transplant Nephrologist

## 2024-06-13 NOTE — PROGRESS NOTES
Physical Therapy    Physical Therapy Treatment    Patient Name: Lea Paulino  MRN: [id number]  Today's Date: 6/13/2024  Time Calculation  Start Time: 1328  Stop Time: 1347  Time Calculation (min): 19 min    Assessment/Plan   PT Assessment  PT Assessment Results: Decreased strength, Decreased endurance, Impaired balance, Decreased mobility, Obesity, Decreased cognition, Decreased coordination, Decreased safety awareness, Impaired vision, Decreased skin integrity  Rehab Prognosis: Fair  Evaluation/Treatment Tolerance: Other (Comment) (did well with sitting)  Medical Staff Made Aware: Yes  Barriers to Participation: Other (Comment) (nausea, overall deconditioning)  End of Session Communication: Bedside nurse  Assessment Comment: 68 yo female with pyelonephritis, pancreatitis, acute hypoxic repsiratory failure, UTI and acute encephalopathy presents a little more alert today (relatively speaking), confused but following some commands as noted and nauseous. Recommend moderate intensity therapy as pt needs all 3 therapies, is not at baseline, has complex medical and rehab needs and likely equipment needs.  End of Session Patient Position: Bed, 3 rail up, Alarm on (RIght sidelying)  PT Plan  Inpatient/Swing Bed or Outpatient: Inpatient  PT Plan  Treatment/Interventions: Bed mobility, Balance training, Neuromuscular re-education, Strengthening, Endurance training, Therapeutic exercise, Therapeutic activity, Home exercise program, Positioning, Postural re-education  PT Plan: Ongoing PT  PT Frequency: 3 times per week  PT Discharge Recommendations: Moderate intensity level of continued care  Equipment Recommended upon Discharge: Other (comment) (TBD at SNF)  PT Recommended Transfer Status: Other (comment) (dependent)  PT - OK to Discharge: Yes (PT evaluation completed & DC recs made)      General Visit Information:   PT  Visit  PT Received On: 06/13/24  General  Reason for Referral: Admitted 6/3 (transfer from [facility]  "Hospital) dx: emphysematous pyelonephritis and acute pancreatitis, acute hypoxic respiratory failure, LAURA, multiple code whites (altered mentation, hypoglycemia, acute hypercapnic respiratory failure), UTI, lethargy, acute encephalopathy  Past Medical History Relevant to Rehab: bipolar disorder, COPD, ESRD s/p DDKT in 2015 on cyclosporine, mycophenolate and prednisone, HTN, HLD, hypothyroid, hyperparathyroid, HFmEF (45-50%), AFib, PVD, lymphedema, DVT  Missed Visit: No  Family/Caregiver Present: No  Co-Treatment: OT  Co-Treatment Reason: pt requires skilled assisdtg of 2 therapists due to medical complexity and lower functional status  Prior to Session Communication: Bedside nurse  Patient Position Received: Bed, 3 rail up, Alarm on  Preferred Learning Style: verbal, kinesthetic, Other:(comment) (difficult to determine due to pts altered mental status)  General Comment: Pt supine alert and engaged, Left ptosis, confused, pt speaking incomprehensibly at times, follows approximately 50-75% of simple commands with frequent repetition.Pt reported nausea pre/during/post (RN informed) and asked \"can I lay down now\" in sitting.    Subjective   Precautions:  Precautions  Medical Precautions: Fall precautions, Oxygen therapy device and L/min  Post-Surgical Precautions:  (altered mental status, bipolar, falls O2, dysphagia, (NG; solids/thin liquids))  Vital Signs:  Vital Signs  Heart Rate:  (sitting EOB:  /78 HR 81  O2 96%)  Patient Position: Sitting    Objective   Pain:     Cognition:  Cognition  Overall Cognitive Status: Impaired  Arousal/Alertness:  (alert, engaged)  Orientation Level:  (oriented to self, knows she is in a hospital (unable to choose correct name of hospital); otherwise disoriented)  Following Commands:  (followed 50-75% of simple 1 step commands)  Safety Judgment: Unable to assess  Problem Solving: Assistance required to implement solutions  Cognition Comments: alert, engaged, confused  Problem " Solving: Exceptions to WFL  Processing Speed: Delayed  Coordination:     Postural Control:  Postural Control  Postural Control: Impaired  Posture Comment: sitting: leans to Right, posterior tile, upper trunk flexion, head/neck flexion  Static Sitting Balance  Static Sitting-Balance Support: Bilateral upper extremity supported, Feet unsupported  Static Sitting-Level of Assistance: Moderate assistance  Static Sitting-Comment/Number of Minutes: leans to Right, with cues/assist able to come mideline (4)    Activity Tolerance:  Activity Tolerance  Endurance: Tolerates less than 10 min exercise with changes in vital signs (4 minutes supported sitting (then asking to lay down, feeling nauseous))  Treatments:  Balance/Neuromuscular Re-Education  Balance/Neuromuscular Re-Education Activity Performed: Yes  Balance/Neuromuscular Re-Education Activity 1: facilitation of rolling as noted  Balance/Neuromuscular Re-Education Activity 2: sitting balance with emphasis on midline and use of bilateral UEs    Bed Mobility  Bed Mobility: Yes  Bed Mobility 1  Bed Mobility 1: Rolling right, Rolling left  Level of Assistance 1: Maximum assistance, Moderate verbal cues, Moderate tactile cues  Bed Mobility 2  Bed Mobility  2: Supine to sitting, Sitting to supine  Level of Assistance 2: Dependent, +2  Bed Mobility 3  Bed Mobility 3: Scooting (sitting)  Level of Assistance 3: Maximum assistance (using Chux)  Bed Mobility 4  Bed Mobility 4: Scooting (supine)  Level of Assistance 4: Dependent, +2    Ambulation/Gait Training  Ambulation/Gait Training Performed: No  Transfers  Transfer: No    Stairs  Stairs: No    Outcome Measures:  [facility] Basic Mobility  Turning from your back to your side while in a flat bed without using bedrails: Total  Moving from lying on your back to sitting on the side of a flat bed without using bedrails: Total  Moving to and from bed to chair (including a wheelchair): Total  Standing up from a chair using your arms (e.g.  wheelchair or bedside chair): Total  To walk in hospital room: Total  Climbing 3-5 steps with railing: Total  Basic Mobility - Total Score: 6    Education Documentation  Mobility Training, taught by Padmini Newsome PT at 6/13/2024  1:51 PM.  Learner: Patient  Readiness: Acceptance  Method: Explanation, Demonstration  Response: Needs Reinforcement, No Evidence of Learning  Comment: reoriented multiple times, rolling, sitting balance, progress, purpose of therapies    Education Comments  No comments found.        OP EDUCATION:       Encounter Problems       Encounter Problems (Active)       Balance       Patient will complete static (independent) and dynamic (supervision) sitting balance activities using UE support as needed in order to maintain midline without acute LOB and reporting < 3/10 RPE scale (Progressing)       Start:  06/06/24    Expected End:  06/27/24            Patient will complete static (CGx1) and dynamic (MINx1) standing balance activities using 2ww without acute LOB  (Not Progressing)       Start:  06/06/24    Expected End:  06/27/24               Mobility       Patient will participate in BLE there-ex program in order to assist in improving strength and to assist with the completion of functional mobility tasks.  (Not Progressing)       Start:  06/06/24    Expected End:  06/27/24            Patient will ambulate >/=50' with 2ww with MINx1 without acute LOB and without rest break (Not Progressing)       Start:  06/06/24    Expected End:  06/27/24            Patient will complete bed mobility with MINx1 with the use of handrails  (Progressing)       Start:  06/06/24    Expected End:  06/27/24               PT Transfers       Patient will complete bed<->chair transfer with MINx1 using LRD as needed without acute LOB  (Not Progressing)       Start:  06/06/24    Expected End:  06/27/24            Patient will complete STS with [id number] using 2ww without acute LOB   (Not Progressing)       Start:   06/06/24    Expected End:  06/27/24

## 2024-06-13 NOTE — PROGRESS NOTES
"Lea Paulino is a 69 y.o. female on day 10 of admission presenting with Pyelonephritis.    Subjective   NAEO. Ms. Abraham awake, alert, and sitting up in bed this morning, though more lethargic when re-visited on rounds. Able to follow basic commands and respond to yes and no questions, though remains disoriented and unable to answer more complex questions. Denies any SOB, abdominal pain, or dysuria.        Objective     Physical Exam  Constitutional:       General: She is not in acute distress.  HENT:      Head: Normocephalic and atraumatic.      Nose: Nose normal.      Mouth/Throat:      Mouth: Mucous membranes are moist.      Pharynx: Oropharynx is clear. No posterior oropharyngeal erythema.   Eyes:      Extraocular Movements: Extraocular movements intact.      Conjunctiva/sclera: Conjunctivae normal.      Pupils: Pupils are equal, round, and reactive to light.   Cardiovascular:      Rate and Rhythm: Normal rate and regular rhythm.      Pulses: Normal pulses.      Heart sounds: Normal heart sounds.   Pulmonary:      Effort: Pulmonary effort is normal. No respiratory distress.      Breath sounds: Normal breath sounds.   Abdominal:      General: Abdomen is flat. There is no distension.      Palpations: Abdomen is soft.      Tenderness: There is no abdominal tenderness.   Musculoskeletal:         General: Normal range of motion.      Cervical back: Normal range of motion and neck supple.      Comments: Scant b/l LE edema. +1 b/l pitting edema of upper extremities   Skin:     General: Skin is warm and dry.      Findings: No lesion or rash.   Neurological:      Mental Status: She is alert.      Comments: Alert and conversant, oriented to self. Unable to answer more complex questions         Last Recorded Vitals  Blood pressure 154/80, pulse 78, temperature 36.5 °C (97.7 °F), resp. rate 20, height 1.575 m (5' 2.01\"), weight 97.8 kg (215 lb 9.8 oz), SpO2 98%.  Intake/Output last 3 Shifts:  I/O last 3 completed " shifts:  In: 1530 (15.6 mL/kg) [NG/GT:1430; IV Piggyback:100]  Out: 2150 (22 mL/kg) [Urine:2150 (0.6 mL/kg/hr)]  Weight: 97.8 kg     Relevant Results  Results for orders placed or performed during the hospital encounter of 06/03/24 (from the past 24 hour(s))   POCT GLUCOSE   Result Value Ref Range    POCT Glucose 191 (H) 74 - 99 mg/dL   POCT GLUCOSE   Result Value Ref Range    POCT Glucose 174 (H) 74 - 99 mg/dL   POCT GLUCOSE   Result Value Ref Range    POCT Glucose 188 (H) 74 - 99 mg/dL   POCT GLUCOSE   Result Value Ref Range    POCT Glucose 172 (H) 74 - 99 mg/dL   POCT GLUCOSE   Result Value Ref Range    POCT Glucose 206 (H) 74 - 99 mg/dL   Comprehensive metabolic panel   Result Value Ref Range    Glucose 218 (H) 74 - 99 mg/dL    Sodium 146 (H) 136 - 145 mmol/L    Potassium 5.1 3.5 - 5.3 mmol/L    Chloride 100 98 - 107 mmol/L    Bicarbonate 38 (H) 21 - 32 mmol/L    Anion Gap 13 10 - 20 mmol/L    Urea Nitrogen 26 (H) 6 - 23 mg/dL    Creatinine 0.90 0.50 - 1.05 mg/dL    eGFR 70 >60 mL/min/1.73m*2    Calcium 9.6 8.6 - 10.6 mg/dL    Albumin 2.6 (L) 3.4 - 5.0 g/dL    Alkaline Phosphatase 112 33 - 136 U/L    Total Protein 4.3 (L) 6.4 - 8.2 g/dL    AST 27 9 - 39 U/L    Bilirubin, Total 0.7 0.0 - 1.2 mg/dL    ALT 32 7 - 45 U/L   Calcium, ionized   Result Value Ref Range    POCT Calcium, Ionized 1.35 (H) 1.1 - 1.33 mmol/L   Magnesium   Result Value Ref Range    Magnesium 1.65 1.60 - 2.40 mg/dL   CBC and Auto Differential   Result Value Ref Range    WBC 9.9 4.4 - 11.3 x10*3/uL    nRBC 0.0 0.0 - 0.0 /100 WBCs    RBC 3.03 (L) 4.00 - 5.20 x10*6/uL    Hemoglobin 10.5 (L) 12.0 - 16.0 g/dL    Hematocrit 34.9 (L) 36.0 - 46.0 %     (H) 80 - 100 fL    MCH 34.7 (H) 26.0 - 34.0 pg    MCHC 30.1 (L) 32.0 - 36.0 g/dL    RDW 15.7 (H) 11.5 - 14.5 %    Platelets 159 150 - 450 x10*3/uL    Neutrophils % 81.6 40.0 - 80.0 %    Immature Granulocytes %, Automated 0.9 0.0 - 0.9 %    Lymphocytes % 6.5 13.0 - 44.0 %    Monocytes % 8.4 2.0 -  10.0 %    Eosinophils % 2.2 0.0 - 6.0 %    Basophils % 0.4 0.0 - 2.0 %    Neutrophils Absolute 8.11 (H) 1.20 - 7.70 x10*3/uL    Immature Granulocytes Absolute, Automated 0.09 0.00 - 0.70 x10*3/uL    Lymphocytes Absolute 0.65 (L) 1.20 - 4.80 x10*3/uL    Monocytes Absolute 0.83 0.10 - 1.00 x10*3/uL    Eosinophils Absolute 0.22 0.00 - 0.70 x10*3/uL    Basophils Absolute 0.04 0.00 - 0.10 x10*3/uL   Lipase   Result Value Ref Range    Lipase 859 (H) 9 - 82 U/L   Amylase   Result Value Ref Range    Amylase 418 (H) 29 - 103 U/L   Phosphorus   Result Value Ref Range    Phosphorus 2.3 (L) 2.5 - 4.9 mg/dL   POCT GLUCOSE   Result Value Ref Range    POCT Glucose 217 (H) 74 - 99 mg/dL             Assessment/Plan   Principal Problem:    Pyelonephritis    Lea Paulino is a 69 y.o. female with a history of COPD, renal transplant in 2015 on cyclosporine, mycophenolate and prednisone, HTN, HLD, hypothyroid, hyperparathyroid, HFmEF (45-50%), AFib, PVD, lymphedema, admitted for emphysematous pyelonephritis and acute pancreatitis in the setting of severe hypercalcemia. Initially treated with IV fluids and abx. Transferred to MICU 6/4 for AHRF s/t pulmonary edema and intubated 6/4-6/5 for airway protection. Extubated 6/5 and transferred back to floor. Hospital course further complicated by hypoglycemia, hypernatremia, and prolonged metabolic encephalopathy. Transplant ID and nephrology following patient.     Updates 6/13:   -Mental status slightly improved, patient remains intermittently lethargic but rouses to voice and touch  -Weaned to 2L NC, appears to have had ~1.7L UOP yesterday (450 mL documented in chart, additional 1.3L in bag) without diuresis  -LAURA resolved. Continue IS at current dosing per transplant recs  -Alkalosis mildly improved (bicarb 41-> 38). Will again hold diuresis today as alkalosis resolves  -Will encourage more PO intake during periods of greater alertness; however, given ongoing waxing and waning, will  likely need discharge to SNF with tube feeds  -BG stable on Tfs, will continue to monitor  -Increase FWF to 300 mL q4h  -Lipase and amylase remain elevated (859, 418), but down-trending since admission   -Continue IV meropenem through 6/17 per ID recs  -Social work following, working with son to select SNF    #Lethargy- improving  #Acute encephalopathy-improving  #Baseline cognitive impairment  -Suspected to be multifactorial in setting of hypoglycemia + hypernatremia+ delirium + infection +/- hypoxia  -History of prolonged delirium with prior hospitalizations per discussion with son Cesar   -MRI 6/11 with no acute findings, spot EEG negative for epileptiform activity  -HSV PCR negative  -DHT placed 6/8, Tfs currently at goal. Passed MBS 6/10 but nursing instructed not to allow PO intake if altered  -Mental status marginally improved, patient cleared repeat bedside swallow test with speech    #AHRF  :: CXR w/ right pleural effusion with adjacent atelectasis/consolidation and interval development of a small left pleural effusion with adjacent atelectasis  -on arivo overnight 6/7, weaned to 4L NC  -O2 sats in upper 90s on 2L NC, will continue to wean  -Holding further diuresis today, will continue to re-assess on daily basis     #LAURA on CKD of transplanted kidney (2015), improving  -Transplant nephrology following, appreciate recs  -LAURA largely resolved (Cr 0.90 from 1.7 on admission), suspect degree of cardiorenal syndrome given improvement with diuresis  -Continue prednisone 5 mg daily,   BID (home dose 1 g daily), cyclosporine 75 mg BID (home dose 100 mg BID) per transplant recs  -CMV titer undetected, EBV detected, BK detected viral load 28     #Metabolic alkalosis-improving  -Bicarb 41 (6/12)-> 38 (6/13)  -Suspect contraction alkalosis in setting of heavy diuresis  -Holding further diuresis today, will reassess on daily basis and consider starting maintenance oral diuretic     #Emphysematous  pyelonephritis  :: CT A/P with gas in the RLQ transplant kidney collecting system and in the urinary bladder, possible gas-forming infection  :: s/p vanc/zosyn and meropenem/gentamicin at OSH, vancomycin 6/3-6/8  :: previously grew pan-sensitive Pseudomonas (4/24) and E. faecalis (resistant to gentamicin, intermediate to doxycycline 1/22)  -Bcx 6/3, repeat blood cx (6/7) NGD4, urine cx (6/3, 6/6) negative  -ID consulted, since signed off  -Continue meropenem 6/3-6/17 (total 14 day course) per ID recs     #Hypernatremia  -147 (6/9)-> 143 (6/10)-> 147 (6/11)-> 147 (6/12)-> 146 (6/13)  -Increase FWF at to 300 ml q4h    #Hypoglycemia-resolved  #Nutrition  -Tfs at goal (45 ml/hr), BG trending in 160s-200s  -Will encourage more PO intake during periods of alertness, continue to monitor glucose  -May need to be discharged to SNF with tube feeds     #Acute pancreatitis, resolved  -Unclear etiology; triglycerides WNL (82) and no evidence of gallstone on imaging. Possibly 2/2 hypercalcemia on admission  -Patient currently asymptomatic, tolerating tube feeds  -Repeat lipase and amylase (6/13) down-trending; lipase 912-> 859, amylase 528-> 418  -Repeat CT in a few weeks regarding fluid collections    #Hypercalcemia  #Hypocalcemia-resolved  :: ddx primary hyperparathyroidism w/ remaining parathyroid tissue vs less likely MEN1 given possible recurrent, significant hypoglycemia concerning for insulinoma  :: s/p subtotal hemithyroidectomy & parathyroidectomy in 2006   :: Calcium 15.9 at [facility], treated with 4 units/kg calcitonin and IV NS but no bisphosphonate, repeat iCa subsequently fell below normal limits on 6/9. iCa WNL (1.22) on repeat 6/10  - iPTH appropriately low on admission, repeat elevated at 204  -PTHrP, Vitamin D 25 and D-1,25 levels WNL, pending SPEP, UPEP   -Endocrine consulted, now signed off  -Holding home calcitriol, continue to trend daily iCal, phos, and Mg    #Afib  #Hx of DVT LLE 2022  -c/w coreg 6.25  mg BID  -Continue home apixaban     #Rep[facility]ust[facility]  #Hypertension  #HFmrEF (LVEF 40-45% 6/4/24)  - Home metoprolol tartrate 12.5 BID switched to coreg 6.25 mg BID  -Continue close electrolyte monitoring and repletion    #Bipolar Disorder  -Continue home desvenlafaxine 100mg  -Holding home clozapine in setting of decreased alertness, will touch base with psych regarding alternatives as needed       #Hypothyroidism  - Continue home levothyroxine     F: PRN  E: replete prn  N: regular, Tfs at 45 ml/hr   A: PIV  DVT ppx: apixaban      Code status: full  NOK: Son Cesar- [phone]              Belkis Paulino MD

## 2024-06-13 NOTE — PROGRESS NOTES
Recreation Therapy Note    Therapy Session  Visit Type: New visit  Session Start Time: 1707  Session End Time: 1731  Intervention Delivery: In-person  Conflict of Service: None  Number of family members present: 0  Family Present for Session: None  Family Participation: None  Number of staff members present: 1    Pre-assessment  Unable to Assess Reason: Outcomes not applicable  Mood/Affect: Appropriate  Verbalized Emotional State:  (none verbalized)    Treatment  Areas of Focus: Coping, Socialization, Self-expression, Normalization, Stress reduction  Co-Treatment:  (none)  Interruption: No  Patient Fell Asleep at End of Session: No    Post-assessment  Unable to Assess Reason: Outcomes not applicable  Mood/Affect: Calm, Confused, Cooperative, Participative  Total Session Time (min): 24 minutes    Narrative  Assessment Detail: Upon arrival patient was laying in bed and was agreeable to engaging in a recreational therapy session.  Plan: To encourage the exploration of safe and effective positive leisure coping skills to manage situational stressors.  Intervention: Worked with patient to paint a sun catcher craft and play ISIDRO.  Evaluation: Patient was confused but pleasant.  Sometimes able to follow one step directions.  Had some difficulty differentiating the colors and numbers on the ISIDRO cards but was able to identify them appropriately approximately 50% of the time.  Pleasant and attempted to be social.  At the end of the session patient attempted to communicate something to this author but was unable to do so clearly.  Follow-up: Will continue to encourage the exploration of positive leisure coping skills to manage situational stressors.  Patient Comments: None noted.    History Of Present Illness  Lea Paulino is a 69 y.o. female with a history of COPD, renal transplant in 2015 on cyclosporine, mycophenolate and prednisone, HTN, HLD, hypothyroid, hyperparathyroid, HFmEF (45-50%), AFib, PVD, lymphedema,  admitted for emphysematous pyelonephritis and acute pancreatitis.

## 2024-06-14 ENCOUNTER — APPOINTMENT (OUTPATIENT)
Dept: RADIOLOGY | Facility: HOSPITAL | Age: 69
End: 2024-06-14
Payer: MEDICARE

## 2024-06-14 PROBLEM — Z94.0 KIDNEY TRANSPLANTED (HHS-HCC): Status: ACTIVE | Noted: 2024-06-14

## 2024-06-14 PROBLEM — J96.01 ACUTE RESPIRATORY FAILURE WITH HYPOXIA (MULTI): Status: RESOLVED | Noted: 2024-06-14 | Resolved: 2024-06-14

## 2024-06-14 PROBLEM — J96.01 ACUTE RESPIRATORY FAILURE WITH HYPOXIA (MULTI): Status: ACTIVE | Noted: 2024-06-14

## 2024-06-14 PROBLEM — G93.41 ACUTE METABOLIC ENCEPHALOPATHY: Status: ACTIVE | Noted: 2024-06-14

## 2024-06-14 PROBLEM — K85.90 PANCREATITIS, ACUTE (HHS-HCC): Status: ACTIVE | Noted: 2024-06-14

## 2024-06-14 LAB
ALBUMIN SERPL BCP-MCNC: 2.7 G/DL (ref 3.4–5)
ALBUMIN SERPL BCP-MCNC: 3 G/DL (ref 3.4–5)
ALP SERPL-CCNC: 108 U/L (ref 33–136)
ALT SERPL W P-5'-P-CCNC: 31 U/L (ref 7–45)
ANION GAP SERPL CALC-SCNC: 12 MMOL/L (ref 10–20)
ANION GAP SERPL CALC-SCNC: 12 MMOL/L (ref 10–20)
AST SERPL W P-5'-P-CCNC: 26 U/L (ref 9–39)
ATRIAL RATE: 116 BPM
ATRIAL RATE: 82 BPM
ATRIAL RATE: 91 BPM
BASOPHILS # BLD AUTO: 0.05 X10*3/UL (ref 0–0.1)
BASOPHILS NFR BLD AUTO: 0.5 %
BILIRUB SERPL-MCNC: 0.8 MG/DL (ref 0–1.2)
BUN SERPL-MCNC: 25 MG/DL (ref 6–23)
BUN SERPL-MCNC: 25 MG/DL (ref 6–23)
CA-I BLD-SCNC: 1.37 MMOL/L (ref 1.1–1.33)
CALCIUM SERPL-MCNC: 10 MG/DL (ref 8.6–10.6)
CALCIUM SERPL-MCNC: 9.8 MG/DL (ref 8.6–10.6)
CHLORIDE SERPL-SCNC: 101 MMOL/L (ref 98–107)
CHLORIDE SERPL-SCNC: 102 MMOL/L (ref 98–107)
CO2 SERPL-SCNC: 32 MMOL/L (ref 21–32)
CO2 SERPL-SCNC: 35 MMOL/L (ref 21–32)
CREAT SERPL-MCNC: 0.94 MG/DL (ref 0.5–1.05)
CREAT SERPL-MCNC: 0.97 MG/DL (ref 0.5–1.05)
CYCLOSPORINE BLD IA-MCNC: 109 NG/ML (ref 80–210)
EGFRCR SERPLBLD CKD-EPI 2021: 64 ML/MIN/1.73M*2
EGFRCR SERPLBLD CKD-EPI 2021: 66 ML/MIN/1.73M*2
EOSINOPHIL # BLD AUTO: 0.28 X10*3/UL (ref 0–0.7)
EOSINOPHIL NFR BLD AUTO: 3.1 %
ERYTHROCYTE [DISTWIDTH] IN BLOOD BY AUTOMATED COUNT: 15.7 % (ref 11.5–14.5)
GLUCOSE BLD MANUAL STRIP-MCNC: 115 MG/DL (ref 74–99)
GLUCOSE BLD MANUAL STRIP-MCNC: 120 MG/DL (ref 74–99)
GLUCOSE BLD MANUAL STRIP-MCNC: 124 MG/DL (ref 74–99)
GLUCOSE BLD MANUAL STRIP-MCNC: 126 MG/DL (ref 74–99)
GLUCOSE BLD MANUAL STRIP-MCNC: 159 MG/DL (ref 74–99)
GLUCOSE BLD MANUAL STRIP-MCNC: 299 MG/DL (ref 74–99)
GLUCOSE SERPL-MCNC: 110 MG/DL (ref 74–99)
GLUCOSE SERPL-MCNC: 125 MG/DL (ref 74–99)
HCT VFR BLD AUTO: 36.2 % (ref 36–46)
HGB BLD-MCNC: 10.9 G/DL (ref 12–16)
IMM GRANULOCYTES # BLD AUTO: 0.16 X10*3/UL (ref 0–0.7)
IMM GRANULOCYTES NFR BLD AUTO: 1.7 % (ref 0–0.9)
LYMPHOCYTES # BLD AUTO: 0.76 X10*3/UL (ref 1.2–4.8)
LYMPHOCYTES NFR BLD AUTO: 8.3 %
MAGNESIUM SERPL-MCNC: 1.97 MG/DL (ref 1.6–2.4)
MCH RBC QN AUTO: 34.5 PG (ref 26–34)
MCHC RBC AUTO-ENTMCNC: 30.1 G/DL (ref 32–36)
MCV RBC AUTO: 115 FL (ref 80–100)
MONOCYTES # BLD AUTO: 0.87 X10*3/UL (ref 0.1–1)
MONOCYTES NFR BLD AUTO: 9.5 %
NEUTROPHILS # BLD AUTO: 7.06 X10*3/UL (ref 1.2–7.7)
NEUTROPHILS NFR BLD AUTO: 76.9 %
NRBC BLD-RTO: 0 /100 WBCS (ref 0–0)
P AXIS: 33 DEGREES
P AXIS: 40 DEGREES
P AXIS: 44 DEGREES
P OFFSET: 177 MS
P OFFSET: 186 MS
P OFFSET: 195 MS
P ONSET: 100 MS
P ONSET: 121 MS
P ONSET: 141 MS
PHOSPHATE SERPL-MCNC: 2.7 MG/DL (ref 2.5–4.9)
PHOSPHATE SERPL-MCNC: 2.8 MG/DL (ref 2.5–4.9)
PLATELET # BLD AUTO: 175 X10*3/UL (ref 150–450)
POTASSIUM SERPL-SCNC: 5.4 MMOL/L (ref 3.5–5.3)
POTASSIUM SERPL-SCNC: 5.5 MMOL/L (ref 3.5–5.3)
PR INTERVAL: 122 MS
PR INTERVAL: 162 MS
PR INTERVAL: 204 MS
PROT SERPL-MCNC: 4.5 G/DL (ref 6.4–8.2)
Q ONSET: 202 MS
QRS COUNT: 13 BEATS
QRS COUNT: 15 BEATS
QRS COUNT: 19 BEATS
QRS DURATION: 138 MS
QRS DURATION: 140 MS
QRS DURATION: 144 MS
QT INTERVAL: 400 MS
QT INTERVAL: 410 MS
QT INTERVAL: 474 MS
QTC CALCULATION(BAZETT): 479 MS
QTC CALCULATION(BAZETT): 492 MS
QTC CALCULATION(BAZETT): 658 MS
QTC FREDERICIA: 455 MS
QTC FREDERICIA: 460 MS
QTC FREDERICIA: 590 MS
R AXIS: -42 DEGREES
R AXIS: -59 DEGREES
R AXIS: -63 DEGREES
RBC # BLD AUTO: 3.16 X10*6/UL (ref 4–5.2)
SODIUM SERPL-SCNC: 140 MMOL/L (ref 136–145)
SODIUM SERPL-SCNC: 143 MMOL/L (ref 136–145)
T AXIS: 115 DEGREES
T AXIS: 117 DEGREES
T AXIS: 136 DEGREES
T OFFSET: 402 MS
T OFFSET: 407 MS
T OFFSET: 439 MS
VENTRICULAR RATE: 116 BPM
VENTRICULAR RATE: 82 BPM
VENTRICULAR RATE: 91 BPM
WBC # BLD AUTO: 9.2 X10*3/UL (ref 4.4–11.3)

## 2024-06-14 PROCEDURE — 80053 COMPREHEN METABOLIC PANEL: CPT

## 2024-06-14 PROCEDURE — 82947 ASSAY GLUCOSE BLOOD QUANT: CPT | Mod: 91

## 2024-06-14 PROCEDURE — 2500000002 HC RX 250 W HCPCS SELF ADMINISTERED DRUGS (ALT 637 FOR MEDICARE OP, ALT 636 FOR OP/ED)

## 2024-06-14 PROCEDURE — 2500000005 HC RX 250 GENERAL PHARMACY W/O HCPCS

## 2024-06-14 PROCEDURE — 2500000004 HC RX 250 GENERAL PHARMACY W/ HCPCS (ALT 636 FOR OP/ED)

## 2024-06-14 PROCEDURE — 74018 RADEX ABDOMEN 1 VIEW: CPT

## 2024-06-14 PROCEDURE — 99233 SBSQ HOSP IP/OBS HIGH 50: CPT

## 2024-06-14 PROCEDURE — A4217 STERILE WATER/SALINE, 500 ML: HCPCS

## 2024-06-14 PROCEDURE — 74018 RADEX ABDOMEN 1 VIEW: CPT | Performed by: RADIOLOGY

## 2024-06-14 PROCEDURE — 80158 DRUG ASSAY CYCLOSPORINE: CPT | Performed by: INTERNAL MEDICINE

## 2024-06-14 PROCEDURE — 1200000002 HC GENERAL ROOM WITH TELEMETRY DAILY

## 2024-06-14 PROCEDURE — 71045 X-RAY EXAM CHEST 1 VIEW: CPT

## 2024-06-14 PROCEDURE — 80069 RENAL FUNCTION PANEL: CPT | Mod: CCI

## 2024-06-14 PROCEDURE — 85025 COMPLETE CBC W/AUTO DIFF WBC: CPT

## 2024-06-14 PROCEDURE — 71045 X-RAY EXAM CHEST 1 VIEW: CPT | Performed by: RADIOLOGY

## 2024-06-14 PROCEDURE — 83735 ASSAY OF MAGNESIUM: CPT

## 2024-06-14 PROCEDURE — 84100 ASSAY OF PHOSPHORUS: CPT | Mod: 91

## 2024-06-14 PROCEDURE — 82330 ASSAY OF CALCIUM: CPT

## 2024-06-14 PROCEDURE — 99233 SBSQ HOSP IP/OBS HIGH 50: CPT | Performed by: INTERNAL MEDICINE

## 2024-06-14 PROCEDURE — 2500000001 HC RX 250 WO HCPCS SELF ADMINISTERED DRUGS (ALT 637 FOR MEDICARE OP)

## 2024-06-14 PROCEDURE — 2500000002 HC RX 250 W HCPCS SELF ADMINISTERED DRUGS (ALT 637 FOR MEDICARE OP, ALT 636 FOR OP/ED): Mod: MUE

## 2024-06-14 RX ORDER — POLYETHYLENE GLYCOL 3350 17 G/17G
17 POWDER, FOR SOLUTION ORAL DAILY
Qty: 30 PACKET | Refills: 2 | Status: SHIPPED | OUTPATIENT
Start: 2024-06-15 | End: 2024-09-13

## 2024-06-14 RX ORDER — SIMVASTATIN 20 MG/1
20 TABLET, FILM COATED ORAL NIGHTLY
Status: DISCONTINUED | OUTPATIENT
Start: 2024-06-14 | End: 2024-06-15 | Stop reason: HOSPADM

## 2024-06-14 RX ORDER — CARVEDILOL 6.25 MG/1
6.25 TABLET ORAL 2 TIMES DAILY
Status: DISCONTINUED | OUTPATIENT
Start: 2024-06-14 | End: 2024-06-15 | Stop reason: HOSPADM

## 2024-06-14 RX ORDER — CARVEDILOL 6.25 MG/1
6.25 TABLET ORAL 2 TIMES DAILY
Refills: 0 | Status: CANCELLED | OUTPATIENT
Start: 2024-06-14

## 2024-06-14 RX ORDER — AMOXICILLIN 250 MG
2 CAPSULE ORAL 2 TIMES DAILY
Qty: 120 TABLET | Refills: 2 | Status: SHIPPED | OUTPATIENT
Start: 2024-06-14 | End: 2024-09-12

## 2024-06-14 RX ORDER — CARVEDILOL 6.25 MG/1
6.25 TABLET ORAL 2 TIMES DAILY
Qty: 30 TABLET | Refills: 2 | Status: SHIPPED | OUTPATIENT
Start: 2024-06-14

## 2024-06-14 RX ORDER — SIMVASTATIN 20 MG/1
20 TABLET, FILM COATED ORAL NIGHTLY
Refills: 0 | Status: CANCELLED | OUTPATIENT
Start: 2024-06-14

## 2024-06-14 RX ORDER — CYCLOSPORINE 100 MG/ML
75 SOLUTION ORAL
Qty: 45 ML | Refills: 2 | Status: SHIPPED | OUTPATIENT
Start: 2024-06-14 | End: 2024-09-12

## 2024-06-14 RX ORDER — MEROPENEM 1 G/1
1 INJECTION, POWDER, FOR SOLUTION INTRAVENOUS EVERY 8 HOURS
Qty: 800 ML | Refills: 0 | Status: SHIPPED | OUTPATIENT
Start: 2024-06-14 | End: 2024-06-17

## 2024-06-14 RX ADMIN — CYCLOSPORINE 75 MG: 100 SOLUTION ORAL at 07:03

## 2024-06-14 RX ADMIN — MEROPENEM 1 G: 1 INJECTION, POWDER, FOR SOLUTION INTRAVENOUS at 22:17

## 2024-06-14 RX ADMIN — PREDNISONE 5 MG: 5 TABLET ORAL at 08:58

## 2024-06-14 RX ADMIN — SENNOSIDES AND DOCUSATE SODIUM 2 TABLET: 50; 8.6 TABLET ORAL at 22:17

## 2024-06-14 RX ADMIN — LEVOTHYROXINE SODIUM 50 MCG: 0.05 TABLET ORAL at 06:18

## 2024-06-14 RX ADMIN — SIMVASTATIN 20 MG: 20 TABLET, FILM COATED ORAL at 22:17

## 2024-06-14 RX ADMIN — CYCLOSPORINE 75 MG: 100 SOLUTION ORAL at 17:51

## 2024-06-14 RX ADMIN — MEROPENEM 1 G: 1 INJECTION, POWDER, FOR SOLUTION INTRAVENOUS at 12:53

## 2024-06-14 RX ADMIN — MEROPENEM 1 G: 1 INJECTION, POWDER, FOR SOLUTION INTRAVENOUS at 03:29

## 2024-06-14 RX ADMIN — APIXABAN 5 MG: 5 TABLET, FILM COATED ORAL at 22:17

## 2024-06-14 RX ADMIN — MYCOPHENOLATE MOFETIL 500 MG: 200 POWDER, FOR SUSPENSION ORAL at 17:50

## 2024-06-14 RX ADMIN — Medication 2 L/MIN: at 08:00

## 2024-06-14 RX ADMIN — MYCOPHENOLATE MOFETIL 500 MG: 200 POWDER, FOR SUSPENSION ORAL at 06:19

## 2024-06-14 RX ADMIN — CARVEDILOL 6.25 MG: 6.25 TABLET, FILM COATED ORAL at 12:53

## 2024-06-14 RX ADMIN — APIXABAN 5 MG: 5 TABLET, FILM COATED ORAL at 08:58

## 2024-06-14 RX ADMIN — DESVENLAFAXINE 100 MG: 100 TABLET, EXTENDED RELEASE ORAL at 08:58

## 2024-06-14 RX ADMIN — CARVEDILOL 6.25 MG: 6.25 TABLET, FILM COATED ORAL at 22:17

## 2024-06-14 RX ADMIN — SODIUM ZIRCONIUM CYCLOSILICATE 10 G: 10 POWDER, FOR SUSPENSION ORAL at 12:53

## 2024-06-14 ASSESSMENT — COGNITIVE AND FUNCTIONAL STATUS - GENERAL
MOVING TO AND FROM BED TO CHAIR: TOTAL
EATING MEALS: TOTAL
WALKING IN HOSPITAL ROOM: TOTAL
DAILY ACTIVITIY SCORE: 8
PERSONAL GROOMING: TOTAL
CLIMB 3 TO 5 STEPS WITH RAILING: TOTAL
DRESSING REGULAR LOWER BODY CLOTHING: A LOT
TURNING FROM BACK TO SIDE WHILE IN FLAT BAD: A LOT
MOVING FROM LYING ON BACK TO SITTING ON SIDE OF FLAT BED WITH BEDRAILS: A LOT
HELP NEEDED FOR BATHING: A LOT
DRESSING REGULAR UPPER BODY CLOTHING: TOTAL
STANDING UP FROM CHAIR USING ARMS: TOTAL
MOBILITY SCORE: 8
TOILETING: TOTAL

## 2024-06-14 ASSESSMENT — PAIN SCALES - WONG BAKER: WONGBAKER_NUMERICALRESPONSE: NO HURT

## 2024-06-14 NOTE — PROGRESS NOTES
[facility] is willing to take pt per team ready tomorrow.  Will set up transport for tomorrow facility is aware.  MITCH Grider  (ex [id number])    2:52pm  Transport set up for discharge 11:00 am tomorrow to [facility].  Pt, MD hu, RN and floor  are aware.  7000 done in HENS blue slip taken to floor .   CARTER DuranS

## 2024-06-14 NOTE — CARE PLAN
The patient's goals for the shift include  getting rest    The clinical goals for the shift include pt will remain free from injury during shift      Problem: Safety  Goal: Patient will be injury free during hospitalization  Outcome: Progressing  Goal: I will remain free of falls  Outcome: Progressing     Problem: Skin  Goal: Prevent/minimize sheer/friction injuries  Outcome: Progressing  Flowsheets (Taken 6/13/2024 2234)  Prevent/minimize sheer/friction injuries:   Use pull sheet   Turn/reposition every 2 hours/use positioning/transfer devices     Problem: Respiratory  Goal: Clear secretions with interventions this shift  Outcome: Progressing  Goal: Minimal/no exertional discomfort or dyspnea this shift  Outcome: Progressing  Goal: Patent airway maintained this shift  Outcome: Progressing  Goal: Wean oxygen to maintain O2 saturation per order/standard this shift  Outcome: Progressing

## 2024-06-14 NOTE — NURSING NOTE
Dobhoff advancement  Asked to advance dobhoff- previously placed tube slipped back into esophagus per KUB exam. Tube connected to [employer] and advanced to 80 cm. 12 [place] dobhoff @ 80 cm L nostril. KUB ordered for placement verification.

## 2024-06-14 NOTE — CARE PLAN
The patient's goals for the shift include  unable to express.    The clinical goals for the shift include pt will remain free from injury during shift    Over the shift, the patient made progress towards all goals.

## 2024-06-14 NOTE — DISCHARGE INSTRUCTIONS
Dear Ms. Jarad Paulino,     You were admitted to [facility] on 6/3 from [facility] for management of an infection in your kidney and inflammation of your pancreatitis. You were transferred to our hospital so that you could be seen and managed by your transplant nephrologist. During your stay, you were seen by our infectious disease specialists and treated with antibiotics. You will continue to receive this antibiotic through 6/17. You also developed fluid accumulation in the lungs, which required intubation and a brief ICU stay. This improved with removal of this fluid with diuretics, and you were transitioned back to room air. You also had some confusion and disorientation during your stay, which we believe is related to your infection and associated electrolyte abnormalities. This gradually improved as your infection was treated.     You were started on nutrition through a feeding tube because your blood sugars were low. These will be continued at your skilled nursing facility, where you will receive extra care as you continue to heal from your infection.     Please follow-up with your transplant nephrologist per her directions.     For additional follow up appointments, per review of records, you are scheduled to see your cardiologist (heart doctor, Dr Gaines) on 7/2/2024. You should also make appointments as soon as your are able with your primary care doctor (Dr Yoder with [facility]) as well as your psychiatrist (Dr Cordova with [facility]) to help coordinate your ongoing care and adjust your medications as needed.    Please do not hesitate to reach out with any further questions or concerns. It was a pleasure taking care of you.  Sincerely,   Your  Team

## 2024-06-14 NOTE — PROGRESS NOTES
"        INPATIENT TRANSPLANT NEPHROLOGY PROGRESS NOTE          REASON FOR CONSULT:  Immunosuppressive medication management and nephrology related issues.    SUBJECTION:     No events overnight.   Continue to HOLD diuresis  UOP 3.8 L off diuretics.  HCO3 , down to 35    PHYCISCAL EXAMINATION:    Visit Vitals  /85   Pulse 75   Temp 36.4 °C (97.5 °F)   Resp 20   Ht 1.575 m (5' 2.01\")   Wt 97.8 kg (215 lb 9.8 oz)   SpO2 97%   BMI 39.43 kg/m²   Smoking Status Unknown   BSA 2.07 m²        06/12 1900 - 06/14 0659  In: 380   Out: 3875 [Urine:3875]     Weight change:     General Appearance - NAD, Good speech, oriented and alert  HEENT - Supple. Not pale. No jaundice. No cervical lymphadenopathy. Pharynx and tonsils are not injected.  CVS - RRR. Normal S1/S2. No murmur, click , rub or gallop  Lungs- clear to auscultation bilaterally  Abdomen - soft , not tender, no guarding, no rigidity. No hepatosplenomegaly. Normal bowel sounds. No masses and ascites. S/P Kidney transplant .  Transplanted kidney is not tender.   Musculoskeletal /Extremities - no edema. Full ROM. No joint tenderness.   Neuro/Psych - appropriate mood and affect. Motor power V/V all extremities. CN I -XII were grossly intact.  Skin - No visible rash    MEDICATION LIST: REVIEWED    apixaban, 5 mg, q12h  carvedilol, 6.25 mg, BID  cycloSPORINE, 75 mg, q12h MARTIN  desvenlafaxine succinate, 100 mg, Daily  levothyroxine, 50 mcg, q AM  lidocaine, 5 mL, Once  meropenem, 1 g, q8h  mycophenolate, 500 mg, BID  oxygen, , Continuous - Inhalation  polyethylene glycol, 17 g, Daily  predniSONE, 5 mg, Daily  sennosides-docusate sodium, 2 tablet, BID  simvastatin, 20 mg, Nightly           alteplase, 2 mg, PRN  dextrose, 12.5 g, q15 min PRN  dextrose, 25 g, q15 min PRN  glucagon, 1 mg, q15 min PRN  glucagon, 1 mg, q15 min PRN  sodium chloride, 1 spray, 4x daily PRN        ALLERGY:  Allergies   Allergen Reactions    Macrodantin [Nitrofurantoin Macrocrystal] GI Upset    Sulfa " (Sulfonamide Antibiotics) Rash       LABS:  Results for orders placed or performed during the hospital encounter of 06/03/24 (from the past 24 hour(s))   POCT GLUCOSE   Result Value Ref Range    POCT Glucose 238 (H) 74 - 99 mg/dL   POCT GLUCOSE   Result Value Ref Range    POCT Glucose 228 (H) 74 - 99 mg/dL   POCT GLUCOSE   Result Value Ref Range    POCT Glucose 159 (H) 74 - 99 mg/dL   POCT GLUCOSE   Result Value Ref Range    POCT Glucose 124 (H) 74 - 99 mg/dL   POCT GLUCOSE   Result Value Ref Range    POCT Glucose 120 (H) 74 - 99 mg/dL   Comprehensive metabolic panel   Result Value Ref Range    Glucose 125 (H) 74 - 99 mg/dL    Sodium 143 136 - 145 mmol/L    Potassium 5.4 (H) 3.5 - 5.3 mmol/L    Chloride 101 98 - 107 mmol/L    Bicarbonate 35 (H) 21 - 32 mmol/L    Anion Gap 12 10 - 20 mmol/L    Urea Nitrogen 25 (H) 6 - 23 mg/dL    Creatinine 0.94 0.50 - 1.05 mg/dL    eGFR 66 >60 mL/min/1.73m*2    Calcium 9.8 8.6 - 10.6 mg/dL    Albumin 2.7 (L) 3.4 - 5.0 g/dL    Alkaline Phosphatase 108 33 - 136 U/L    Total Protein 4.5 (L) 6.4 - 8.2 g/dL    AST 26 9 - 39 U/L    Bilirubin, Total 0.8 0.0 - 1.2 mg/dL    ALT 31 7 - 45 U/L   Calcium, ionized   Result Value Ref Range    POCT Calcium, Ionized 1.37 (H) 1.1 - 1.33 mmol/L   Magnesium   Result Value Ref Range    Magnesium 1.97 1.60 - 2.40 mg/dL   CBC and Auto Differential   Result Value Ref Range    WBC 9.2 4.4 - 11.3 x10*3/uL    nRBC 0.0 0.0 - 0.0 /100 WBCs    RBC 3.16 (L) 4.00 - 5.20 x10*6/uL    Hemoglobin 10.9 (L) 12.0 - 16.0 g/dL    Hematocrit 36.2 36.0 - 46.0 %     (H) 80 - 100 fL    MCH 34.5 (H) 26.0 - 34.0 pg    MCHC 30.1 (L) 32.0 - 36.0 g/dL    RDW 15.7 (H) 11.5 - 14.5 %    Platelets 175 150 - 450 x10*3/uL    Neutrophils % 76.9 40.0 - 80.0 %    Immature Granulocytes %, Automated 1.7 (H) 0.0 - 0.9 %    Lymphocytes % 8.3 13.0 - 44.0 %    Monocytes % 9.5 2.0 - 10.0 %    Eosinophils % 3.1 0.0 - 6.0 %    Basophils % 0.5 0.0 - 2.0 %    Neutrophils Absolute 7.06 1.20 -  7.70 x10*3/uL    Immature Granulocytes Absolute, Automated 0.16 0.00 - 0.70 x10*3/uL    Lymphocytes Absolute 0.76 (L) 1.20 - 4.80 x10*3/uL    Monocytes Absolute 0.87 0.10 - 1.00 x10*3/uL    Eosinophils Absolute 0.28 0.00 - 0.70 x10*3/uL    Basophils Absolute 0.05 0.00 - 0.10 x10*3/uL   Phosphorus   Result Value Ref Range    Phosphorus 2.7 2.5 - 4.9 mg/dL   POCT GLUCOSE   Result Value Ref Range    POCT Glucose 299 (H) 74 - 99 mg/dL   POCT GLUCOSE   Result Value Ref Range    POCT Glucose 115 (H) 74 - 99 mg/dL        ASSESSMENT AND PLAN:    69 y.o. 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 Transferred to [facility] on 6/2. ID consulted for emphysematous pyelonephritis.     Transplant nephrology is consulted to assist with immunosuppressive medication management and nephrology related issues.        1. LAURA S/P Kidney transplant. Due to UTI, ATN, pancreatitis  Lab Results   Component Value Date    CREATININE 0.94 06/14/2024        - Renal allograft function: STABLE    -Echocardiogram with EF 40-45% and NORMAL RVSP.    -Agreed with continue to hold diuretics  -Monitor K/Mg/phos. Replace prn.     - Continue to monitor UOP and Serum creatinine closely.   - Avoid nephrotoxic agents, NSAIDs and IV contrast   - Strict I/O.   - Renally dose all medications by the most recent CrCl from Cockcroft-Gault formula.    2. Immunosuppression   - continue current immunosuppression   - Monitor cyclosporine trough level before am dose is given. Target CSA trough level at 12 hour ~  - Continue cyclosporine at current dose. Level today = PENDING ; continue same  dose  -MMF suspension 500 mg bid  - Prednisone 5 mg daily    3. Anemia and WBC   Lab Results   Component Value Date    WBC 9.2 06/14/2024    HGB 10.9 (L) 06/14/2024    HCT 36.2 06/14/2024     (H) 06/14/2024     06/14/2024     -Continue to monitor Hgb   -No indications for PRBC transfusion     4. Electrolyte   Lab Results   Component Value Date    GLUCOSE 125 (H) 06/14/2024    CALCIUM 9.8 06/14/2024     06/14/2024    K 5.4 (H) 06/14/2024    CO2 35 (H) 06/14/2024     06/14/2024    BUN 25 (H) 06/14/2024    CREATININE 0.94 06/14/2024     - Reviewed renal profile.   - Continue Free H2O 300 ml q 4 hours  - Already on low sodium TF formula    6. Hypertension   Blood Pressures         6/13/2024  2149 6/14/2024  0037 6/14/2024  0412 6/14/2024  0503 6/14/2024  0809    BP: 158/98 152/85 143/78 163/84 152/85          -Goal BP < 140/90 mmHg   -continue current management     7. UTI /Emphysematous pyelonephritis   Defer to ID  On meropenem till 6/17 per ID    8.  GI prophylaxis   - On PPI     9. DVT Prophylaxis  -Defer to primary team    10. Pancreatitis  Lab Results   Component Value Date    AMYLASE 418 (H) 06/13/2024       Lab Results   Component Value Date    LIPASE 859 (H) 06/13/2024     - Less likely from hypercalcemia. Need to rule out other causes  -defer to primary team.      11. AMS  -MRI  -Defer to primary team    * Case was discussed with primary team.  For questions, please contact transplant nephrology page x [phone]    Deidra Robb    Transplant Nephrologist

## 2024-06-14 NOTE — NURSING NOTE
Patient Event note  RN went to assess patient. Pt's dobhaff is at 38cm. RN referred back to placement note in which dobhaff was placed at approximatley at 80cm. RN stopped tubefeeds and notified MD Eduardo for repeat cxray. Pt resting safely in bed with call light in reach and bed alarm on.Gina Xavier RN

## 2024-06-14 NOTE — PROGRESS NOTES
Lea Paulino is a 69 y.o. female on day 11 of admission presenting with Pyelonephritis.    Transitional Care Coordination Progress Note:  Patient discussed during interdisciplinary rounds.   Team members present: MD and TCC  Plan per Medical/Surgical team: Patient mental status and labs Improving. Will be medially ready tomorrow 06/15/24.  Payor: Medicare  Discharge disposition: FOC: [facility] ([facility] Transport at 11:00 AM 06/15/24 per doctors request.  Potential Barriers: None  ADOD: 06/15/24    VARGAS BARAJAS RN

## 2024-06-14 NOTE — PROGRESS NOTES
Lea Paulino is a 69 y.o. female on day 11 of admission presenting with Pyelonephritis.    Subjective   Overnight, patient's Dobhoff noted to be inappropriately pulled back by nursing. Tube feeds held and stat KUB and CXR ordered, showing tube in mid-esophagus. CXR without evidence of consolidation or aspiration, patient remains asymptomatic and was weaned to RA this morning.    Patient's mental status appears to be closer to baseline; patient is pleasantly confused and perseverates on certain questions, but is conversant and oriented to self. She denies any current SOB, chest pain, abdominal pain, constipation, or dysuria.        Objective     Physical Exam  Constitutional:       General: She is not in acute distress.  HENT:      Head: Normocephalic and atraumatic.      Nose: Nose normal.      Mouth/Throat:      Mouth: Mucous membranes are moist.      Pharynx: Oropharynx is clear. No posterior oropharyngeal erythema.   Eyes:      Extraocular Movements: Extraocular movements intact.      Conjunctiva/sclera: Conjunctivae normal.      Pupils: Pupils are equal, round, and reactive to light.   Cardiovascular:      Rate and Rhythm: Normal rate and regular rhythm.      Pulses: Normal pulses.      Heart sounds: Normal heart sounds.   Pulmonary:      Effort: Pulmonary effort is normal. No respiratory distress.   Abdominal:      General: Abdomen is flat. There is no distension.      Palpations: Abdomen is soft.      Tenderness: There is no abdominal tenderness.   Musculoskeletal:         General: Normal range of motion.      Cervical back: Normal range of motion and neck supple.      Right lower leg: No edema.      Left lower leg: No edema.      Comments: Significant interval improvement in b/l upper and lower extremity edema   Skin:     General: Skin is warm and dry.      Findings: No rash.   Neurological:      General: No focal deficit present.      Mental Status: She is alert.      Comments: Pleasantly confused,  "conversant. Oriented to self.        Last Recorded Vitals  Blood pressure 151/90, pulse 79, temperature 36.3 °C (97.3 °F), resp. rate 18, height 1.575 m (5' 2.01\"), weight 97.8 kg (215 lb 9.8 oz), SpO2 96%.  Intake/Output last 3 Shifts:  I/O last 3 completed shifts:  In: 380 (3.9 mL/kg) [NG/GT:180; IV Piggyback:200]  Out: 3875 (39.6 mL/kg) [Urine:3875 (1.1 mL/kg/hr)]  Weight: 97.8 kg     Relevant Results  Results for orders placed or performed during the hospital encounter of 06/03/24 (from the past 24 hour(s))   POCT GLUCOSE   Result Value Ref Range    POCT Glucose 228 (H) 74 - 99 mg/dL   POCT GLUCOSE   Result Value Ref Range    POCT Glucose 159 (H) 74 - 99 mg/dL   POCT GLUCOSE   Result Value Ref Range    POCT Glucose 124 (H) 74 - 99 mg/dL   POCT GLUCOSE   Result Value Ref Range    POCT Glucose 120 (H) 74 - 99 mg/dL   Comprehensive metabolic panel   Result Value Ref Range    Glucose 125 (H) 74 - 99 mg/dL    Sodium 143 136 - 145 mmol/L    Potassium 5.4 (H) 3.5 - 5.3 mmol/L    Chloride 101 98 - 107 mmol/L    Bicarbonate 35 (H) 21 - 32 mmol/L    Anion Gap 12 10 - 20 mmol/L    Urea Nitrogen 25 (H) 6 - 23 mg/dL    Creatinine 0.94 0.50 - 1.05 mg/dL    eGFR 66 >60 mL/min/1.73m*2    Calcium 9.8 8.6 - 10.6 mg/dL    Albumin 2.7 (L) 3.4 - 5.0 g/dL    Alkaline Phosphatase 108 33 - 136 U/L    Total Protein 4.5 (L) 6.4 - 8.2 g/dL    AST 26 9 - 39 U/L    Bilirubin, Total 0.8 0.0 - 1.2 mg/dL    ALT 31 7 - 45 U/L   Calcium, ionized   Result Value Ref Range    POCT Calcium, Ionized 1.37 (H) 1.1 - 1.33 mmol/L   Cyclosporine level   Result Value Ref Range    Cyclosporine 109 80 - 210 ng/mL   Magnesium   Result Value Ref Range    Magnesium 1.97 1.60 - 2.40 mg/dL   CBC and Auto Differential   Result Value Ref Range    WBC 9.2 4.4 - 11.3 x10*3/uL    nRBC 0.0 0.0 - 0.0 /100 WBCs    RBC 3.16 (L) 4.00 - 5.20 x10*6/uL    Hemoglobin 10.9 (L) 12.0 - 16.0 g/dL    Hematocrit 36.2 36.0 - 46.0 %     (H) 80 - 100 fL    MCH 34.5 (H) 26.0 - " 34.0 pg    MCHC 30.1 (L) 32.0 - 36.0 g/dL    RDW 15.7 (H) 11.5 - 14.5 %    Platelets 175 150 - 450 x10*3/uL    Neutrophils % 76.9 40.0 - 80.0 %    Immature Granulocytes %, Automated 1.7 (H) 0.0 - 0.9 %    Lymphocytes % 8.3 13.0 - 44.0 %    Monocytes % 9.5 2.0 - 10.0 %    Eosinophils % 3.1 0.0 - 6.0 %    Basophils % 0.5 0.0 - 2.0 %    Neutrophils Absolute 7.06 1.20 - 7.70 x10*3/uL    Immature Granulocytes Absolute, Automated 0.16 0.00 - 0.70 x10*3/uL    Lymphocytes Absolute 0.76 (L) 1.20 - 4.80 x10*3/uL    Monocytes Absolute 0.87 0.10 - 1.00 x10*3/uL    Eosinophils Absolute 0.28 0.00 - 0.70 x10*3/uL    Basophils Absolute 0.05 0.00 - 0.10 x10*3/uL   Phosphorus   Result Value Ref Range    Phosphorus 2.7 2.5 - 4.9 mg/dL   POCT GLUCOSE   Result Value Ref Range    POCT Glucose 299 (H) 74 - 99 mg/dL   POCT GLUCOSE   Result Value Ref Range    POCT Glucose 115 (H) 74 - 99 mg/dL     Imaging:   XR ABDOMEN 1 VIEW;  6/13/2024 10:43 pm  IMPRESSION:  1.  Enteric tube now projects over the mid esophagus. Recommend  advancement.        This patient has a central line   Reason for the central line remaining today?  IV Antibiotics    This patient has a urinary catheter   Reason for the urinary catheter remaining today? Urine catheter unnecessary, will be removed today       Assessment/Plan   Principal Problem:    Pyelonephritis  Active Problems:    Acute metabolic encephalopathy    Pancreatitis, acute ([facility]-HCC)    Kidney transplanted ([facility]HS-HCC)    Lea Paulino is a 69 y.o. female with a history of COPD, renal transplant in 2015 on cyclosporine, mycophenolate and prednisone, HTN, HLD, hypothyroid, hyperparathyroid, HFmEF (45-50%), AFib, PVD, lymphedema, admitted for emphysematous pyelonephritis and acute pancreatitis in the setting of severe hypercalcemia. Initially treated with IV fluids and abx. Transferred to MICU 6/4 for AHRF 2/2 pulmonary edema and intubated 6/4-6/5 for airway protection. Extubated 6/5 and transferred back  to floor. Hospital course further complicated by hypoglycemia, hypernatremia, and prolonged metabolic encephalopathy. Transplant ID and nephrology following patient.     Updates 6/14:   -Mental status continues to gradually improve, patient remains alert and conversant  -Weaned to RA on rounds, O2 sats 93-94%. Holding further diuresis as patient is auto-diuresing   -Rapid team consulted for Dobhoff advancement, will obtain repeat KUB and resume Tfs once appropriate placement is confirmed  -BP elevated, will resume home carvedilol  -Hypernatremia improving, will resume FWF at 300 mL q4h once Dobhoff advanced  -Alkalosis improving (41->38->35)  -K mildly elevated to 5.4; will start Lokelma 10 mg daily per discussion with txp nephrology and repeat PM RFP  -Continue IV meropenem through 6/17 per ID recs   -Patient accepted to [facility], plan for discharge tomorrow with tube feeds and free water flushes    #Lethargy- improving  #Acute encephalopathy-improving  #Baseline cognitive impairment  -Suspected to be multifactorial in setting of hypoglycemia + hypernatremia+ delirium + infection +/- hypoxia  -History of prolonged delirium with prior hospitalizations per discussion with son Cesar   -MRI 6/11 with no acute findings, spot EEG negative for epileptiform activity  -HSV PCR negative  -DHT placed 6/8, Tfs currently at goal. Passed MBS 6/10 but nursing instructed not to allow PO intake if altered     #AHRF- resolved  :: CXR w/ right pleural effusion with adjacent atelectasis/consolidation and interval development of a small left pleural effusion with adjacent atelectasis  -Initially intubated, weaned to Airvo and now on RA  -Holding further diuresis today     #LAURA on CKD of transplanted kidney (2015), improving  -Transplant nephrology following, appreciate recs  -LAURA largely resolved (Cr 0.90 from 1.7 on admission), suspect degree of cardiorenal syndrome given improvement with diuresis  -Continue prednisone 5 mg daily,    BID (home dose 1 g daily), cyclosporine 75 mg BID (home dose 100 mg BID) per transplant recs  -CMV titer undetected, EBV detected, BK detected viral load 28     #Hyperkalemia  -Will start Lokelma 10 mg daily per discussion with txp nephrology    #Metabolic alkalosis-improving  -Bicarb 41 (6/12)-> 38 (6/13) -> 35 (6/14)  -Suspect contraction alkalosis in setting of heavy diuresis  -Holding further diuresis today, will reassess on daily basis     #Emphysematous pyelonephritis- improving  :: CT A/P with gas in the RLQ transplant kidney collecting system and in the urinary bladder, possible gas-forming infection  :: s/p vanc/zosyn and meropenem/gentamicin at OSH, vancomycin 6/3-6/8  :: previously grew pan-sensitive Pseudomonas (4/24) and E. faecalis (resistant to gentamicin, intermediate to doxycycline 1/22)  -Bcx 6/3, repeat blood cx (6/7) NGD4, urine cx (6/3, 6/6) negative  -ID consulted, since signed off  -Continue meropenem 6/3-6/17 (total 14 day course) per ID recs     #Hypernatremia- improving  -147 (6/9)-> 143 (6/10)-> 147 (6/11)-> 147 (6/12)-> 146 (6/13)-> 143 (6/14)  -Continue FWF at to 300 ml q4h     #Hypoglycemia-resolved  #Nutrition  -Tfs at goal (45 ml/hr), BG trending in 160s-200s  -Will encourage more PO intake during periods of alertness, continue to monitor glucose  -May need to be discharged to SNF with tube feeds     #Acute pancreatitis, resolved  -Unclear etiology; triglycerides WNL (82) and no evidence of gallstone on imaging. Possibly 2/2 hypercalcemia on admission  -Patient currently asymptomatic, tolerating tube feeds  -Repeat lipase and amylase (6/13) down-trending; lipase 912-> 859, amylase 528-> 418  -Repeat CT in a few weeks regarding fluid collections     #Hypercalcemia- improving  #Hypocalcemia-resolved  :: s/p subtotal hemithyroidectomy & parathyroidectomy in 2006   :: Calcium 15.9 at [facility], treated with 4 units/kg calcitonin and IV NS but no bisphosphonate, repeat iCa  subsequently fell below normal limits on 6/9. iCa WNL (1.22) on repeat 6/10, again rising over past several days  - iPTH appropriately low on admission, repeat elevated at 204  -PTHrP, vitamin D 25 and D-1,25 levels, SPEP and UPEP all WNL  -Endocrine consulted, now signed off. Suspect degree of secondary hyperparathyroidism in setting of chronic renal disease and partially retained parathyroid tissue  -Holding home calcitriol, continue to trend daily iCal, phos, and Mg     #Afib  #Hx of DVT LLE 2022  -Continue home coreg 6.25 mg BID  -Continue home apixaban     #Reported nonsustained [facility] at [facility]  #Hypertension  #HFmrEF (LVEF 40-45% 6/4/24)  - Home metoprolol tartrate 12.5 BID switched to coreg 6.25 mg BID  -Continue close electrolyte monitoring and repletion     #Bipolar Disorder  -Continue home desvenlafaxine 100mg  -Holding home clozapine in setting of decreased alertness, will touch base with psych regarding alternatives as needed     #Hypothyroidism  - Continue home levothyroxine     F: PRN  E: replete prn  N: regular, Tfs at 45 ml/hr   A: PIV  DVT ppx: apixaban      Code status: full  NOK: Son Cesar- [phone]               Belkis Paulino MD

## 2024-06-14 NOTE — RESEARCH NOTES
Artificial Intelligence Monitoring in Nursing (AIMS Nursing) Study    Principle Investigator - Dr. Dilip Coto  Research Coordinator - Veronika Rey RN     Patient Name - Lea Paulino  Date - 6/14/2024 10:31 AM  Location - [place] [address]    Lea Paulino was approached by Veronika Rey RN to talk about participating in the AIMS Nursing Study. The patient was not able to be approached, a research coordinator will come back at a later time. Study protocol was followed and patient was given study contact information.     Veronika Rey RN

## 2024-06-15 VITALS
OXYGEN SATURATION: 94 % | RESPIRATION RATE: 19 BRPM | TEMPERATURE: 98.1 F | DIASTOLIC BLOOD PRESSURE: 80 MMHG | BODY MASS INDEX: 39.68 KG/M2 | HEART RATE: 66 BPM | SYSTOLIC BLOOD PRESSURE: 157 MMHG | HEIGHT: 62 IN | WEIGHT: 215.61 LBS

## 2024-06-15 LAB
ALBUMIN SERPL BCP-MCNC: 2.9 G/DL (ref 3.4–5)
ALP SERPL-CCNC: 104 U/L (ref 33–136)
ALT SERPL W P-5'-P-CCNC: 33 U/L (ref 7–45)
ANION GAP SERPL CALC-SCNC: 14 MMOL/L (ref 10–20)
AST SERPL W P-5'-P-CCNC: 28 U/L (ref 9–39)
BASOPHILS # BLD AUTO: 0.07 X10*3/UL (ref 0–0.1)
BASOPHILS NFR BLD AUTO: 0.7 %
BILIRUB SERPL-MCNC: 0.8 MG/DL (ref 0–1.2)
BUN SERPL-MCNC: 27 MG/DL (ref 6–23)
CA-I BLD-SCNC: 1.4 MMOL/L (ref 1.1–1.33)
CALCIUM SERPL-MCNC: 9.9 MG/DL (ref 8.6–10.6)
CHLORIDE SERPL-SCNC: 103 MMOL/L (ref 98–107)
CO2 SERPL-SCNC: 30 MMOL/L (ref 21–32)
CREAT SERPL-MCNC: 1 MG/DL (ref 0.5–1.05)
CYCLOSPORINE BLD IA-MCNC: 129 NG/ML (ref 80–210)
EGFRCR SERPLBLD CKD-EPI 2021: 61 ML/MIN/1.73M*2
EOSINOPHIL # BLD AUTO: 0.39 X10*3/UL (ref 0–0.7)
EOSINOPHIL NFR BLD AUTO: 3.8 %
ERYTHROCYTE [DISTWIDTH] IN BLOOD BY AUTOMATED COUNT: 15.2 % (ref 11.5–14.5)
GLUCOSE BLD MANUAL STRIP-MCNC: 178 MG/DL (ref 74–99)
GLUCOSE SERPL-MCNC: 159 MG/DL (ref 74–99)
HCT VFR BLD AUTO: 34.7 % (ref 36–46)
HGB BLD-MCNC: 11.1 G/DL (ref 12–16)
IMM GRANULOCYTES # BLD AUTO: 0.17 X10*3/UL (ref 0–0.7)
IMM GRANULOCYTES NFR BLD AUTO: 1.6 % (ref 0–0.9)
LYMPHOCYTES # BLD AUTO: 0.74 X10*3/UL (ref 1.2–4.8)
LYMPHOCYTES NFR BLD AUTO: 7.1 %
MAGNESIUM SERPL-MCNC: 1.79 MG/DL (ref 1.6–2.4)
MCH RBC QN AUTO: 34.6 PG (ref 26–34)
MCHC RBC AUTO-ENTMCNC: 32 G/DL (ref 32–36)
MCV RBC AUTO: 108 FL (ref 80–100)
MONOCYTES # BLD AUTO: 1.05 X10*3/UL (ref 0.1–1)
MONOCYTES NFR BLD AUTO: 10.1 %
NEUTROPHILS # BLD AUTO: 7.93 X10*3/UL (ref 1.2–7.7)
NEUTROPHILS NFR BLD AUTO: 76.7 %
NRBC BLD-RTO: 0 /100 WBCS (ref 0–0)
PHOSPHATE SERPL-MCNC: 3 MG/DL (ref 2.5–4.9)
PLATELET # BLD AUTO: 193 X10*3/UL (ref 150–450)
POTASSIUM SERPL-SCNC: 5.2 MMOL/L (ref 3.5–5.3)
PROT SERPL-MCNC: 4.8 G/DL (ref 6.4–8.2)
RBC # BLD AUTO: 3.21 X10*6/UL (ref 4–5.2)
SODIUM SERPL-SCNC: 142 MMOL/L (ref 136–145)
WBC # BLD AUTO: 10.4 X10*3/UL (ref 4.4–11.3)

## 2024-06-15 PROCEDURE — 2500000004 HC RX 250 GENERAL PHARMACY W/ HCPCS (ALT 636 FOR OP/ED)

## 2024-06-15 PROCEDURE — 2500000001 HC RX 250 WO HCPCS SELF ADMINISTERED DRUGS (ALT 637 FOR MEDICARE OP)

## 2024-06-15 PROCEDURE — 99239 HOSP IP/OBS DSCHRG MGMT >30: CPT | Performed by: STUDENT IN AN ORGANIZED HEALTH CARE EDUCATION/TRAINING PROGRAM

## 2024-06-15 PROCEDURE — 82947 ASSAY GLUCOSE BLOOD QUANT: CPT

## 2024-06-15 PROCEDURE — 82330 ASSAY OF CALCIUM: CPT

## 2024-06-15 PROCEDURE — 85025 COMPLETE CBC W/AUTO DIFF WBC: CPT

## 2024-06-15 PROCEDURE — 80158 DRUG ASSAY CYCLOSPORINE: CPT | Performed by: INTERNAL MEDICINE

## 2024-06-15 PROCEDURE — A4217 STERILE WATER/SALINE, 500 ML: HCPCS

## 2024-06-15 PROCEDURE — 80053 COMPREHEN METABOLIC PANEL: CPT

## 2024-06-15 PROCEDURE — 84100 ASSAY OF PHOSPHORUS: CPT

## 2024-06-15 PROCEDURE — 83735 ASSAY OF MAGNESIUM: CPT

## 2024-06-15 RX ORDER — CALCIUM GLUCONATE 20 MG/ML
1 INJECTION, SOLUTION INTRAVENOUS EVERY 4 HOURS
Status: DISCONTINUED | OUTPATIENT
Start: 2024-06-15 | End: 2024-06-15

## 2024-06-15 RX ORDER — CALCIUM CARBONATE 200(500)MG
500 TABLET,CHEWABLE ORAL DAILY
Status: DISCONTINUED | OUTPATIENT
Start: 2024-06-15 | End: 2024-06-15 | Stop reason: HOSPADM

## 2024-06-15 RX ADMIN — LEVOTHYROXINE SODIUM 50 MCG: 0.05 TABLET ORAL at 05:47

## 2024-06-15 RX ADMIN — MYCOPHENOLATE MOFETIL 500 MG: 200 POWDER, FOR SUSPENSION ORAL at 05:47

## 2024-06-15 RX ADMIN — CARVEDILOL 6.25 MG: 6.25 TABLET, FILM COATED ORAL at 08:29

## 2024-06-15 RX ADMIN — MEROPENEM 1 G: 1 INJECTION, POWDER, FOR SOLUTION INTRAVENOUS at 05:47

## 2024-06-15 RX ADMIN — APIXABAN 5 MG: 5 TABLET, FILM COATED ORAL at 08:29

## 2024-06-15 NOTE — DISCHARGE SUMMARY
Discharge Diagnosis  Pyelonephritis    Issues Requiring Follow-Up    To follow-up at SNF:  -Meropenem through 6/17 for emphysematous pyelonephritis. Patient has PICC  -Continue tube feeds with free water flushes, tube feeds can be weaned as patient's mental status and PO intake continued to improve  -Continue  mg BID, cyclosporine 75 mg BID, prednisone 5 mg daily  -Continue Lokelma 10 mg daily, follow-up RFP and adjust accordingly)    Test Results Pending At Discharge  Pending Labs       Order Current Status    CBC and Auto Differential Collected (06/05/24 8575)    Calcium, ionized Collected (06/07/24 0634)            Hospital Course  Lea Paulino is a 69 y.o. female with a history of COPD, renal transplant (2015), HTN, HLD, hypothyroid, hyperparathyroidism s/p partial parathyroidectomy, HFmEF (45-50%), AFib, PVD, and lymphedema, who was 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. Hospital course c/b hypernatremia, hypoglycemia, AHRF 2/2 volume overload, and metabolic encephalopathy.     OSH Course:  Ms. Jarad Paulino was initially admitted to a [facility] on June 1 with epigastric pain and dysuria.  Imaging and lipase c/w emphysematous pyelonephritis and acute pancreatitis.  Her workup at the time showed an elevated creatinine of 2.71 baseline is about 1.5-1.7 as well as hypercalcemia of 15.4.  She had a CT abdomen pelvis which showed pancreatic inflammation as well as gas in the collecting system and urinary bladder which was concerning for infection with gas-forming organism.  Patient was made n.p.o. and started on IV fluids.  Patient was started on vancomycin and Zosyn and then transition to meropenem and gentamicin.  Patient had episodes of nonsustained V. tach for which cardiology was consulted and recommended increasing beta-blocker and repeating an echo.  Per nephrology, patient was recommended IV resuscitation as well as current immunosuppressive regimen and  hypercalcemia workup.      Patient transferred to  on 6/3 given history of renal transplant at .      Hospital Course:  On transfer from OSH, patient was found to be altered with BG of 37, for which she was given 2 A of D50. Seen by ID and started on vac/tahira for emphysematous pyelo. Evaluated by transplant neph for recommendations on immunosuppression regimen.    Overnight 6/3-6/4, she developed worsening acute respiratory distress with a VBG showing a pH of 7.23, CO2 66 and a lactate of 1.  Patient was placed on BiPAP.  X-ray chest done 6/3 showed right-sided pleural effusion with associated atelectasis/consolidation.      On arrival to the ICU 6/4, patient intubated for airway protection given  There was suspicion for meningitis in MICU given recent epidural steroidinjection, so started on acyclovir. CTH and panspine completed showed no acute findings. CT A/P showed enlarged edematous pancreas without definite necrotic changes and few peripancreatic fluid collections without discrete wall. Few foci of gas within the transplant renal collecting system is likely secondary to patient's known emphysematous pyelonephritis and evidence of mucous plugging and/or aspiration involving the right lower and middle segmental bronchi with associated small right pleural effusion and collapse of the posterior right lower lobe. Given low clinical concern for meningitis, acyclovir was stopped. Patient was extubated on 6/6 with improvement in mentation and transferred to floor on 2.5L.    On 6/7, a rapid response was called for lethargy and increased work of breathing, found to be hypoglycemic with worsening pulmonary edema on CXR. Started on D5 drip, micafungin, airvo and diuresed with slight improvement in mentation and work of breathing. Endocrine consulted for persistent hypoglycemia, recommended starting tube feeds. DHT placed 6/8. Vanc and micafungin stopped 6/8, with plan to continue meropenem through 6/17 per  discussion with ID.     Patient continued to have altered mentation and lethargy, despite appropriate antibiotic coverage and improvement in hypoglycemia. She underwent MRI brain 6/11, which was unremarkable for acute abnormality. Spot EEG additionally unremarkable for epileptic activity. Patient's encephalopathy ultimately felt to be a result of multiple metabolic derangements, superimposed on known cognitive impairment (confirmed by patient's son).     Patient gradually became more alert and conversant, appears now to be close to mental status baseline at time of admission (pleasantly confused, oriented x1). She will be discharged to skilled nursing facility.      To follow-up at SNF:  -Meropenem through 6/17 for emphysematous pyelonephritis. Patient has PICC  -Continue tube feeds with free water flushes, tube feeds can be weaned as patient's mental status and PO intake continued to improve  -Needs repeat   -Continue  mg BID, cyclosporine 75 mg BID, prednisone 5 mg daily  -Continue Lokelma 10 mg daily, follow-up RFP and adjust accordingly)    To follow-up with PCP:  -Thickened endometrial stripe in uterus noted on CT    Pertinent Physical Exam At Time of Discharge  Physical Exam  Vitals reviewed.   Constitutional:       General: She is not in acute distress.  HENT:      Head: Normocephalic and atraumatic.   Cardiovascular:      Rate and Rhythm: Normal rate and regular rhythm.      Heart sounds: Normal heart sounds.   Pulmonary:      Effort: Pulmonary effort is normal.      Breath sounds: Normal breath sounds.   Abdominal:      General: Bowel sounds are normal.      Palpations: Abdomen is soft.      Tenderness: There is no abdominal tenderness.   Musculoskeletal:         General: No swelling.   Skin:     General: Skin is warm and dry.   Neurological:      General: No focal deficit present.      Mental Status: She is alert. She is disoriented.   Psychiatric:         Mood and Affect: Mood normal.         Home  Medications     Medication List      START taking these medications     carvedilol 6.25 mg tablet; Commonly known as: Coreg; Take 1 tablet (6.25   mg) by mouth 2 times a day.   cycloSPORINE 100 mg/mL microemulsion solution; Commonly known as:   NEOral; Take 0.75 mL (75 mg) by mouth every 12 hours.   meropenem 1 gram/100 mL IV; Commonly known as: Merrem; Infuse 100 mL (1   g) at 200 mL/hr over 0.5 hours into a venous catheter every 8 hours for 8   doses.   polyethylene glycol 17 gram packet; Commonly known as: Glycolax,   Miralax; Take 17 g by mouth once daily.   sennosides-docusate sodium 8.6-50 mg tablet; Commonly known as:   Padmaja-Colace; Take 2 tablets by mouth 2 times a day.   sodium chloride 0.65 % nasal spray; Commonly known as: Ocean; Administer   1 spray into each nostril 4 times a day as needed for congestion.   sodium zirconium cyclosilicate 10 gram packet; Commonly known as:   Lokelma; Take 10 g by mouth once daily.     CONTINUE taking these medications     acetaminophen 325 mg tablet; Commonly known as: Tylenol   acyclovir 400 mg tablet; Commonly known as: Zovirax   allopurinol 100 mg tablet; Commonly known as: Zyloprim   desvenlafaxine 100 mg 24 hr tablet; Commonly known as: Pristiq   Eliquis 5 mg tablet; Generic drug: apixaban   ferrous sulfate (325 mg ferrous sulfate) tablet   folic acid 1 mg tablet; Commonly known as: Folvite   levothyroxine 50 mcg tablet; Commonly known as: Synthroid, Levoxyl   mycophenolate 500 mg tablet; Commonly known as: Cellcept   oxyCODONE-acetaminophen 5-325 mg tablet; Commonly known as: Percocet   predniSONE 5 mg tablet; Commonly known as: Deltasone   simvastatin 40 mg tablet; Commonly known as: Zocor     STOP taking these medications     bumetanide 1 mg tablet; Commonly known as: Bumex   calcitriol 0.5 mcg capsule; Commonly known as: Rocaltrol   cloZAPine 50 mg tablet; Commonly known as: Clozaril   cycloSPORINE 100 mg capsule; Commonly known as: SandIMMUNE   docusate sodium  100 mg capsule; Commonly known as: Colace   metoprolol tartrate 25 mg tablet; Commonly known as: Lopressor   vancomycin 125 mg capsule; Commonly known as: Vancocin   Vitamin D3 25 MCG (1000 UT) tablet; Generic drug: cholecalciferol       Outpatient Follow-Up  No future appointments.    Kisha Edward MD

## 2024-06-15 NOTE — NURSING NOTE
Patient safe overnight. A&Ox1. RA. Patient is being discharge to SNF. Attempted to call [facility] (formerly [facility]) to give report. No answer.     0805: Report called and given to DORA Ramirez at [facility]. [facility]

## 2024-06-18 ENCOUNTER — OUTSIDE SERVICES (OUTPATIENT)
Dept: PRIMARY CARE CLINIC | Age: 69
End: 2024-06-18

## 2024-06-18 DIAGNOSIS — I50.22 CHRONIC SYSTOLIC (CONGESTIVE) HEART FAILURE (HCC): ICD-10-CM

## 2024-06-18 DIAGNOSIS — I10 PRIMARY HYPERTENSION: ICD-10-CM

## 2024-06-18 DIAGNOSIS — I48.0 PAROXYSMAL ATRIAL FIBRILLATION (HCC): ICD-10-CM

## 2024-06-18 DIAGNOSIS — J44.1 CHRONIC OBSTRUCTIVE PULMONARY DISEASE WITH (ACUTE) EXACERBATION (HCC): ICD-10-CM

## 2024-06-18 DIAGNOSIS — E03.9 ACQUIRED HYPOTHYROIDISM: ICD-10-CM

## 2024-06-18 DIAGNOSIS — N10 ACUTE PYELONEPHRITIS: Primary | ICD-10-CM

## 2024-06-18 DIAGNOSIS — E78.2 MIXED HYPERLIPIDEMIA: ICD-10-CM

## 2024-06-18 DIAGNOSIS — Z94.0 S/P KIDNEY TRANSPLANT: ICD-10-CM

## 2024-06-19 ENCOUNTER — HOSPITAL ENCOUNTER (INPATIENT)
Age: 69
LOS: 2 days | Discharge: NURSING, REHAB OR LONG-TERM CARE | End: 2024-06-22
Attending: EMERGENCY MEDICINE | Admitting: INTERNAL MEDICINE
Payer: MEDICARE

## 2024-06-19 DIAGNOSIS — K85.90 ACUTE PANCREATITIS, UNSPECIFIED COMPLICATION STATUS, UNSPECIFIED PANCREATITIS TYPE: Primary | ICD-10-CM

## 2024-06-19 LAB
ALBUMIN SERPL-MCNC: 2.9 G/DL (ref 3.5–5.2)
ALP SERPL-CCNC: 100 U/L (ref 35–104)
ALT SERPL-CCNC: 19 U/L (ref 0–32)
ANION GAP SERPL CALCULATED.3IONS-SCNC: 6 MMOL/L (ref 7–16)
AST SERPL-CCNC: 19 U/L (ref 0–31)
BILIRUB SERPL-MCNC: 0.5 MG/DL (ref 0–1.2)
BUN SERPL-MCNC: 24 MG/DL (ref 6–23)
CALCIUM SERPL-MCNC: 10 MG/DL (ref 8.6–10.2)
CHLORIDE SERPL-SCNC: 99 MMOL/L (ref 98–107)
CO2 SERPL-SCNC: 32 MMOL/L (ref 22–29)
CREAT SERPL-MCNC: 0.9 MG/DL (ref 0.5–1)
GFR, ESTIMATED: 75 ML/MIN/1.73M2
GLUCOSE SERPL-MCNC: 90 MG/DL (ref 74–99)
LACTATE BLDV-SCNC: 0.3 MMOL/L (ref 0.5–2.2)
LIPASE SERPL-CCNC: 1211 U/L (ref 13–60)
POTASSIUM SERPL-SCNC: 5 MMOL/L (ref 3.5–5)
PROT SERPL-MCNC: 5.6 G/DL (ref 6.4–8.3)
SODIUM SERPL-SCNC: 137 MMOL/L (ref 132–146)
TROPONIN I SERPL HS-MCNC: 41 NG/L (ref 0–9)

## 2024-06-19 PROCEDURE — 2580000003 HC RX 258: Performed by: EMERGENCY MEDICINE

## 2024-06-19 PROCEDURE — 93005 ELECTROCARDIOGRAM TRACING: CPT | Performed by: EMERGENCY MEDICINE

## 2024-06-19 PROCEDURE — 99285 EMERGENCY DEPT VISIT HI MDM: CPT

## 2024-06-19 PROCEDURE — 83605 ASSAY OF LACTIC ACID: CPT

## 2024-06-19 PROCEDURE — 85025 COMPLETE CBC W/AUTO DIFF WBC: CPT

## 2024-06-19 PROCEDURE — 83690 ASSAY OF LIPASE: CPT

## 2024-06-19 PROCEDURE — 84484 ASSAY OF TROPONIN QUANT: CPT

## 2024-06-19 PROCEDURE — 80053 COMPREHEN METABOLIC PANEL: CPT

## 2024-06-19 RX ORDER — SODIUM CHLORIDE, SODIUM LACTATE, POTASSIUM CHLORIDE, AND CALCIUM CHLORIDE .6; .31; .03; .02 G/100ML; G/100ML; G/100ML; G/100ML
500 INJECTION, SOLUTION INTRAVENOUS ONCE
Status: COMPLETED | OUTPATIENT
Start: 2024-06-19 | End: 2024-06-20

## 2024-06-19 RX ADMIN — SODIUM CHLORIDE, POTASSIUM CHLORIDE, SODIUM LACTATE AND CALCIUM CHLORIDE 500 ML: 600; 310; 30; 20 INJECTION, SOLUTION INTRAVENOUS at 23:27

## 2024-06-19 ASSESSMENT — PAIN DESCRIPTION - LOCATION: LOCATION: ABDOMEN

## 2024-06-19 ASSESSMENT — LIFESTYLE VARIABLES
HOW OFTEN DO YOU HAVE A DRINK CONTAINING ALCOHOL: NEVER
HOW MANY STANDARD DRINKS CONTAINING ALCOHOL DO YOU HAVE ON A TYPICAL DAY: PATIENT DOES NOT DRINK

## 2024-06-19 ASSESSMENT — PAIN - FUNCTIONAL ASSESSMENT: PAIN_FUNCTIONAL_ASSESSMENT: 0-10

## 2024-06-19 ASSESSMENT — PAIN SCALES - GENERAL: PAINLEVEL_OUTOF10: 10

## 2024-06-19 ASSESSMENT — PAIN DESCRIPTION - DESCRIPTORS: DESCRIPTORS: HEAVINESS;TIGHTNESS

## 2024-06-19 ASSESSMENT — PAIN DESCRIPTION - ORIENTATION: ORIENTATION: MID

## 2024-06-20 ENCOUNTER — APPOINTMENT (OUTPATIENT)
Dept: CT IMAGING | Age: 69
End: 2024-06-20
Payer: MEDICARE

## 2024-06-20 PROBLEM — K85.90 ACUTE PANCREATITIS: Status: ACTIVE | Noted: 2024-06-01

## 2024-06-20 LAB
ATRIAL RATE: 82 BPM
BASOPHILS # BLD: 0.06 K/UL (ref 0–0.2)
BASOPHILS NFR BLD: 1 % (ref 0–2)
BILIRUB UR QL STRIP: NEGATIVE
CHOLEST SERPL-MCNC: 129 MG/DL
CLARITY UR: CLEAR
COLOR UR: YELLOW
EOSINOPHIL # BLD: 0.06 K/UL (ref 0.05–0.5)
EOSINOPHILS RELATIVE PERCENT: 1 % (ref 0–6)
ERYTHROCYTE [DISTWIDTH] IN BLOOD BY AUTOMATED COUNT: 14.4 % (ref 11.5–15)
GLUCOSE UR STRIP-MCNC: NEGATIVE MG/DL
HCT VFR BLD AUTO: 32 % (ref 34–48)
HDLC SERPL-MCNC: 34 MG/DL
HGB BLD-MCNC: 9.9 G/DL (ref 11.5–15.5)
HGB UR QL STRIP.AUTO: NEGATIVE
KETONES UR STRIP-MCNC: NEGATIVE MG/DL
LDLC SERPL CALC-MCNC: 69 MG/DL
LEUKOCYTE ESTERASE UR QL STRIP: NEGATIVE
LYMPHOCYTES NFR BLD: 0.89 K/UL (ref 1.5–4)
LYMPHOCYTES RELATIVE PERCENT: 12 % (ref 20–42)
MAGNESIUM SERPL-MCNC: 1.9 MG/DL (ref 1.6–2.6)
MCH RBC QN AUTO: 35.2 PG (ref 26–35)
MCHC RBC AUTO-ENTMCNC: 30.9 G/DL (ref 32–34.5)
MCV RBC AUTO: 113.9 FL (ref 80–99.9)
METAMYELOCYTES ABSOLUTE COUNT: 0.13 K/UL (ref 0–0.12)
METAMYELOCYTES: 2 % (ref 0–1)
MONOCYTES NFR BLD: 0.51 K/UL (ref 0.1–0.95)
MONOCYTES NFR BLD: 7 % (ref 2–12)
MYELOCYTES ABSOLUTE COUNT: 0.13 K/UL
MYELOCYTES: 2 %
NEUTROPHILS NFR BLD: 75 % (ref 43–80)
NEUTS SEG NFR BLD: 5.42 K/UL (ref 1.8–7.3)
NITRITE UR QL STRIP: NEGATIVE
P AXIS: 40 DEGREES
P OFFSET: 177 MS
P ONSET: 121 MS
PH UR STRIP: 6.5 [PH] (ref 5–9)
PLATELET, FLUORESCENCE: 205 K/UL (ref 130–450)
PMV BLD AUTO: 13.7 FL (ref 7–12)
PR INTERVAL: 162 MS
PROT UR STRIP-MCNC: ABNORMAL MG/DL
Q ONSET: 202 MS
QRS COUNT: 13 BEATS
QRS DURATION: 144 MS
QT INTERVAL: 410 MS
QTC CALCULATION(BAZETT): 479 MS
QTC FREDERICIA: 455 MS
R AXIS: -42 DEGREES
RBC # BLD AUTO: 2.81 M/UL (ref 3.5–5.5)
RBC # BLD: ABNORMAL 10*6/UL
RBC #/AREA URNS HPF: ABNORMAL /HPF
SP GR UR STRIP: 1.02 (ref 1–1.03)
T AXIS: 117 DEGREES
T OFFSET: 407 MS
TRIGL SERPL-MCNC: 132 MG/DL
TROPONIN I SERPL HS-MCNC: 42 NG/L (ref 0–9)
UROBILINOGEN UR STRIP-ACNC: 0.2 EU/DL (ref 0–1)
VENTRICULAR RATE: 82 BPM
VLDLC SERPL CALC-MCNC: 26 MG/DL
WBC #/AREA URNS HPF: ABNORMAL /HPF
WBC OTHER # BLD: 7.2 K/UL (ref 4.5–11.5)
YEAST URNS QL MICRO: PRESENT

## 2024-06-20 PROCEDURE — 81001 URINALYSIS AUTO W/SCOPE: CPT

## 2024-06-20 PROCEDURE — 70450 CT HEAD/BRAIN W/O DYE: CPT

## 2024-06-20 PROCEDURE — 2580000003 HC RX 258: Performed by: FAMILY MEDICINE

## 2024-06-20 PROCEDURE — 74177 CT ABD & PELVIS W/CONTRAST: CPT

## 2024-06-20 PROCEDURE — 2580000003 HC RX 258: Performed by: HOSPITALIST

## 2024-06-20 PROCEDURE — P9612 CATHETERIZE FOR URINE SPEC: HCPCS

## 2024-06-20 PROCEDURE — 6370000000 HC RX 637 (ALT 250 FOR IP): Performed by: FAMILY MEDICINE

## 2024-06-20 PROCEDURE — 6360000004 HC RX CONTRAST MEDICATION: Performed by: RADIOLOGY

## 2024-06-20 PROCEDURE — 2580000003 HC RX 258: Performed by: INTERNAL MEDICINE

## 2024-06-20 PROCEDURE — 83735 ASSAY OF MAGNESIUM: CPT

## 2024-06-20 PROCEDURE — 6360000002 HC RX W HCPCS: Performed by: FAMILY MEDICINE

## 2024-06-20 PROCEDURE — 80061 LIPID PANEL: CPT

## 2024-06-20 PROCEDURE — 99222 1ST HOSP IP/OBS MODERATE 55: CPT | Performed by: FAMILY MEDICINE

## 2024-06-20 PROCEDURE — NBSRV NON-BILLABLE SERVICE: Performed by: INTERNAL MEDICINE

## 2024-06-20 PROCEDURE — 84484 ASSAY OF TROPONIN QUANT: CPT

## 2024-06-20 PROCEDURE — 1200000000 HC SEMI PRIVATE

## 2024-06-20 PROCEDURE — 97161 PT EVAL LOW COMPLEX 20 MIN: CPT | Performed by: PHYSICAL THERAPIST

## 2024-06-20 PROCEDURE — 6360000002 HC RX W HCPCS: Performed by: HOSPITALIST

## 2024-06-20 PROCEDURE — C9113 INJ PANTOPRAZOLE SODIUM, VIA: HCPCS | Performed by: HOSPITALIST

## 2024-06-20 RX ORDER — FERROUS SULFATE 325(65) MG
325 TABLET ORAL
Status: DISCONTINUED | OUTPATIENT
Start: 2024-06-20 | End: 2024-06-22 | Stop reason: HOSPADM

## 2024-06-20 RX ORDER — DOCUSATE SODIUM 100 MG/1
100 CAPSULE, LIQUID FILLED ORAL NIGHTLY
Status: DISCONTINUED | OUTPATIENT
Start: 2024-06-20 | End: 2024-06-20 | Stop reason: ALTCHOICE

## 2024-06-20 RX ORDER — SODIUM CHLORIDE 0.9 % (FLUSH) 0.9 %
5-40 SYRINGE (ML) INJECTION PRN
Status: DISCONTINUED | OUTPATIENT
Start: 2024-06-20 | End: 2024-06-22 | Stop reason: HOSPADM

## 2024-06-20 RX ORDER — SODIUM CHLORIDE, SODIUM LACTATE, POTASSIUM CHLORIDE, CALCIUM CHLORIDE 600; 310; 30; 20 MG/100ML; MG/100ML; MG/100ML; MG/100ML
INJECTION, SOLUTION INTRAVENOUS CONTINUOUS
Status: DISCONTINUED | OUTPATIENT
Start: 2024-06-20 | End: 2024-06-22 | Stop reason: HOSPADM

## 2024-06-20 RX ORDER — LEVOTHYROXINE SODIUM 0.05 MG/1
50 TABLET ORAL DAILY
Status: DISCONTINUED | OUTPATIENT
Start: 2024-06-20 | End: 2024-06-22 | Stop reason: HOSPADM

## 2024-06-20 RX ORDER — SODIUM CHLORIDE, SODIUM LACTATE, POTASSIUM CHLORIDE, CALCIUM CHLORIDE 600; 310; 30; 20 MG/100ML; MG/100ML; MG/100ML; MG/100ML
INJECTION, SOLUTION INTRAVENOUS CONTINUOUS
Status: DISCONTINUED | OUTPATIENT
Start: 2024-06-20 | End: 2024-06-20

## 2024-06-20 RX ORDER — ONDANSETRON 2 MG/ML
4 INJECTION INTRAMUSCULAR; INTRAVENOUS EVERY 6 HOURS PRN
Status: DISCONTINUED | OUTPATIENT
Start: 2024-06-20 | End: 2024-06-22 | Stop reason: HOSPADM

## 2024-06-20 RX ORDER — FOLIC ACID 1 MG/1
1 TABLET ORAL DAILY
Status: DISCONTINUED | OUTPATIENT
Start: 2024-06-20 | End: 2024-06-22 | Stop reason: HOSPADM

## 2024-06-20 RX ORDER — SODIUM CHLORIDE 9 MG/ML
INJECTION, SOLUTION INTRAVENOUS PRN
Status: DISCONTINUED | OUTPATIENT
Start: 2024-06-20 | End: 2024-06-22 | Stop reason: HOSPADM

## 2024-06-20 RX ORDER — MYCOPHENOLATE MOFETIL 250 MG/1
1000 CAPSULE ORAL 2 TIMES DAILY
Status: DISCONTINUED | OUTPATIENT
Start: 2024-06-20 | End: 2024-06-21

## 2024-06-20 RX ORDER — CLOZAPINE 25 MG/1
50 TABLET ORAL NIGHTLY
Status: DISCONTINUED | OUTPATIENT
Start: 2024-06-20 | End: 2024-06-20 | Stop reason: ALTCHOICE

## 2024-06-20 RX ORDER — IPRATROPIUM BROMIDE AND ALBUTEROL SULFATE 2.5; .5 MG/3ML; MG/3ML
1 SOLUTION RESPIRATORY (INHALATION) EVERY 4 HOURS PRN
Status: DISCONTINUED | OUTPATIENT
Start: 2024-06-20 | End: 2024-06-22 | Stop reason: HOSPADM

## 2024-06-20 RX ORDER — ENOXAPARIN SODIUM 100 MG/ML
40 INJECTION SUBCUTANEOUS DAILY
Status: DISCONTINUED | OUTPATIENT
Start: 2024-06-20 | End: 2024-06-20

## 2024-06-20 RX ORDER — VITAMIN B COMPLEX
1000 TABLET ORAL DAILY
Status: DISCONTINUED | OUTPATIENT
Start: 2024-06-20 | End: 2024-06-22 | Stop reason: HOSPADM

## 2024-06-20 RX ORDER — ATORVASTATIN CALCIUM 20 MG/1
20 TABLET, FILM COATED ORAL DAILY
Status: DISCONTINUED | OUTPATIENT
Start: 2024-06-20 | End: 2024-06-22 | Stop reason: HOSPADM

## 2024-06-20 RX ORDER — PREDNISONE 5 MG/1
5 TABLET ORAL DAILY
Status: DISCONTINUED | OUTPATIENT
Start: 2024-06-20 | End: 2024-06-22 | Stop reason: HOSPADM

## 2024-06-20 RX ORDER — SODIUM CHLORIDE 0.9 % (FLUSH) 0.9 %
5-40 SYRINGE (ML) INJECTION EVERY 12 HOURS SCHEDULED
Status: DISCONTINUED | OUTPATIENT
Start: 2024-06-20 | End: 2024-06-22 | Stop reason: HOSPADM

## 2024-06-20 RX ORDER — ONDANSETRON 4 MG/1
4 TABLET, ORALLY DISINTEGRATING ORAL EVERY 8 HOURS PRN
Status: DISCONTINUED | OUTPATIENT
Start: 2024-06-20 | End: 2024-06-22 | Stop reason: HOSPADM

## 2024-06-20 RX ORDER — MYCOPHENOLATE MOFETIL 500 MG/1
1000 TABLET ORAL 2 TIMES DAILY
Status: DISCONTINUED | OUTPATIENT
Start: 2024-06-20 | End: 2024-06-20 | Stop reason: CLARIF

## 2024-06-20 RX ADMIN — FERROUS SULFATE TAB 325 MG (65 MG ELEMENTAL FE) 325 MG: 325 (65 FE) TAB at 12:02

## 2024-06-20 RX ADMIN — MYCOPHENOLATE MOFETIL 1000 MG: 250 CAPSULE ORAL at 12:02

## 2024-06-20 RX ADMIN — PANTOPRAZOLE SODIUM 40 MG: 40 INJECTION, POWDER, FOR SOLUTION INTRAVENOUS at 09:43

## 2024-06-20 RX ADMIN — Medication 1000 UNITS: at 12:02

## 2024-06-20 RX ADMIN — SODIUM CHLORIDE, POTASSIUM CHLORIDE, SODIUM LACTATE AND CALCIUM CHLORIDE: 600; 310; 30; 20 INJECTION, SOLUTION INTRAVENOUS at 05:58

## 2024-06-20 RX ADMIN — PREDNISONE 5 MG: 5 TABLET ORAL at 12:02

## 2024-06-20 RX ADMIN — SODIUM CHLORIDE, POTASSIUM CHLORIDE, SODIUM LACTATE AND CALCIUM CHLORIDE: 600; 310; 30; 20 INJECTION, SOLUTION INTRAVENOUS at 18:46

## 2024-06-20 RX ADMIN — METOPROLOL TARTRATE 12.5 MG: 25 TABLET, FILM COATED ORAL at 12:02

## 2024-06-20 RX ADMIN — ATORVASTATIN CALCIUM 20 MG: 20 TABLET, FILM COATED ORAL at 12:02

## 2024-06-20 RX ADMIN — DOCUSATE SODIUM 100 MG: 50 LIQUID ORAL at 21:21

## 2024-06-20 RX ADMIN — IOPAMIDOL 80 ML: 755 INJECTION, SOLUTION INTRAVENOUS at 00:55

## 2024-06-20 RX ADMIN — APIXABAN 5 MG: 5 TABLET, FILM COATED ORAL at 21:20

## 2024-06-20 RX ADMIN — PANTOPRAZOLE SODIUM 40 MG: 40 INJECTION, POWDER, FOR SOLUTION INTRAVENOUS at 21:22

## 2024-06-20 RX ADMIN — LEVOTHYROXINE SODIUM 50 MCG: 0.05 TABLET ORAL at 12:03

## 2024-06-20 RX ADMIN — Medication 10 ML: at 12:04

## 2024-06-20 RX ADMIN — FOLIC ACID 1 MG: 1 TABLET ORAL at 12:02

## 2024-06-20 RX ADMIN — Medication 10 ML: at 21:34

## 2024-06-20 RX ADMIN — SODIUM CHLORIDE, POTASSIUM CHLORIDE, SODIUM LACTATE AND CALCIUM CHLORIDE: 600; 310; 30; 20 INJECTION, SOLUTION INTRAVENOUS at 13:29

## 2024-06-20 RX ADMIN — APIXABAN 5 MG: 5 TABLET, FILM COATED ORAL at 12:02

## 2024-06-20 RX ADMIN — MYCOPHENOLATE MOFETIL 1000 MG: 250 CAPSULE ORAL at 21:20

## 2024-06-20 RX ADMIN — METOPROLOL TARTRATE 12.5 MG: 25 TABLET, FILM COATED ORAL at 21:18

## 2024-06-20 ASSESSMENT — ENCOUNTER SYMPTOMS
ABDOMINAL PAIN: 1
EYE REDNESS: 0
EYE DISCHARGE: 0
VOICE CHANGE: 0
EYE ITCHING: 0
SINUS PRESSURE: 0
SHORTNESS OF BREATH: 0
COUGH: 0
SINUS PAIN: 0
SORE THROAT: 0
TROUBLE SWALLOWING: 0
RHINORRHEA: 0
WHEEZING: 0

## 2024-06-20 ASSESSMENT — PAIN SCALES - GENERAL
PAINLEVEL_OUTOF10: 0
PAINLEVEL_OUTOF10: 0

## 2024-06-20 ASSESSMENT — VISUAL ACUITY: OU: 1

## 2024-06-20 NOTE — H&P
[facility]   History and Physical      CHIEF COMPLAINT:  abnormal outpatient lipase    History of Present Illness:  69 y.o. female with a history of COPD, renal transplant in 2015 on cyclosporine, mycophenolate and prednisone, HTN, HLD, hypothyroid, hyperparathyroid, combined systolic and diastolic heart failure, AFib, PVD, lymphedema presents with abnormal amylase/lipase as outpatient at nursing facility.  Was recently admitted 6/1-6/3 with drug induced pancreatitis, cyclosporine was held at that time.  Patient was transferred to  on 6/3 and d/c on 6/15 to nursing facility.  During admission at  was treated with Merrem for emphysematous pyelonephritis in transplant kidney.  During admission she was intubated, treated for possible aspiration pneumonia.  Had recurrent hypoglycemia for which tube feeding was started via dobhoff.  Had altered mental status with lethargy (baseline confused, oriented to person only), workup at  included MRI brain on 6/11 and EEG which were both negative for acute pathology.  Nephrology at  recommended continuing patient's anti rejection regimen.  Patient had outpatient labs drawn and was sent to ED because lipase and amylase were abnormal.  Workup in ED significant for lactic acid 0.3, creatinine 0.9 down from 2.5 last admission, calcium 10.0 down from 12.7 last admission, troponin 41 and 42, lipase 1211 (was 1033 last admission), WBC 7.2 down from 20.9 on day of discharge, hgb 9.9 down from 13.9 on day of discharge.  CT abd/pelvis acute pancreatitis with small peripancreatic fluid collections.  Patient is unaware of where she is at ([place] \"[place]\" when asked, unable to determine she is at a hospital), she is aware of the year.  She is not sure why she is here.  Denies any symptoms at this time.    Informant(s) for H&P: patient, chart    REVIEW OF SYSTEMS:  no fevers, chills, cp, sob, n/v, ha, vision/hearing changes, wt changes, hot/cold  tablet Take 2 tablets by mouth 2 times daily    Provider, MD Radha       Allergies:    Cipro [ciprofloxacin hcl], Macrodantin [nitrofurantoin macrocrystal], and Sulfa antibiotics    Social History:    reports that she quit smoking about 8 years ago. Her smoking use included cigarettes. She started smoking about 28 years ago. She has a 20.0 pack-year smoking history. She has never used smokeless tobacco. She reports that she does not drink alcohol and does not use drugs.      Family History:   family history includes Breast Cancer in her maternal grandmother and sister; Dementia in her father; Diabetes in her father and mother.     PHYSICAL EXAM:  Vitals:  /63   Pulse 65   Temp 97.4 °F (36.3 °C) (Oral)   Resp 20   Ht 1.549 m (5' 1\")   Wt 96.6 kg (213 lb)   SpO2 98%   BMI 40.25 kg/m²     Constitutional:  appears older than documented age, obese, NAD, awake, confused  Eyes: no scleral icterus, normal lids, no discharge  ENMT:  Normocephalic, atraumatic, mucosa dry, EOMI  Neck:  trachea midline, no JVD  Lungs:  CTA bilaterally, no audible rhonchi or wheezes noted, respirations unlabored, no retractions  Heart::  RRR, distant heart tones, no murmur, rub, or gallop noted during exam  Abd:  Soft, epigastric tender, non distended, bowel sounds present  :  deferred  MSK: sarcopenia present  Ext:  Moving all extremities, no edema, pulses present  Skin:  Warm and dry, no rashes on visible skin, bruising on forearms  Psych: non-anxious affect  Neuro:  PERRL, Alert, grossly nonfocal; following commands, confused    LABS:  Recent Labs     06/19/24  2256      K 5.0   CL 99   CO2 32*   BUN 24*   CREATININE 0.9   GLUCOSE 90   CALCIUM 10.0       Recent Labs     06/19/24  2256   WBC 7.2   RBC 2.81*   HGB 9.9*   HCT 32.0*   .9*   MCH 35.2*   MCHC 30.9*   RDW 14.4   MPV 13.7*       No results for input(s): \"POCGLU\" in the last 72 hours.      Radiology: CT HEAD WO CONTRAST    Result Date:

## 2024-06-20 NOTE — CONSULTS
CONSULT  Gonzalo Flores M.D.  [facility]  Dr. Keny Mcallister M.D.,  Dr. Dane Sofia M.D.,  STEPHANIE ThomsonO.,  Dr. Alfa Jimenez D.O. ,  Dr. Chris Fowler M.D.,          Natali J Keyon Wolff  69 y.o.  female      Re: \"admitted 6/1 with drug induced pancreatitis, lipase still elevated[facility]  Requesting physician: Dr. Escalante  Date:8:01 AM 6/20/2024          HPI: 69-year-old female patient seen in the hospital for above described issue.  Patient has history of COPD, hyperlipidemia, hypertension, hypothyroidism, hyperparathyroidism, congestive heart failure, atrial fibrillation, oral anticoagulation, PVD and lymphedema.  Patient also has history of renal failure and renal transplant in 2015.  According to the notes patient is on cyclosporine, mycophenolate and prednisone.  According to the notes patient was admitted to the hospital in the beginning of this month and discharged on 3 Nevin.  During that hospital admission no GI consultation has been obtained.  Patient was transferred to  6/3/2024 and apparently discharged on 15 Nevin to a nursing home.  Patient presents from a nursing home apparently because of elevated lipase.  She has a nasal inserted feeding tube  -according to records available from  eats a nasal enteric tube with x-ray placement confirmed tip in the proximal portion of the duodenum  Laboratory work on this presentation shows lipase of 1211 compared to lipase of 1033 on 1 June.  Liver profile shows nonelevated transaminases, nonelevated alkaline phosphatase and nonelevated bilirubin.  Chemistry panel reveals BUN of 24 with creatinine of 0.9.  CBC reveals somewhat new anemia with hemoglobin of 9.9 hematocrit of 32.  White blood cell count is 7.2 and platelet count of 205.  Abdominal imaging has been obtained with CT scan with IV contrast reported to show acute pancreatitis with small peripancreatic fluid collections.  Also report this right

## 2024-06-20 NOTE — CARE COORDINATION
Case Management Assessment  Initial Evaluation    Date/Time of Evaluation: 6/20/2024 2:04 PM  Assessment Completed by: Gilda Butts RN    If patient is discharged prior to next notation, then this note serves as note for discharge by case management.    Patient Name: Natali Wolff                   YOB: 1955  Diagnosis: Drug-induced acute pancreatitis without infection or necrosis [K85.30]  Acute pancreatitis, unspecified complication status, unspecified pancreatitis type [K85.90]                   Date / Time: 6/19/2024  9:57 PM    Patient Admission Status: Observation   Readmission Risk (Low < 19, Mod (19-27), High > 27): Readmission Risk Score: 19.4    Current PCP: Brennon Malave, DO  PCP verified by CM? Yes    Chart Reviewed: Yes      History Provided by: Patient  Patient Orientation: Alert and Oriented, Person, Place, Situation    Patient Cognition: Alert    Hospitalization in the last 30 days (Readmission):  Yes    Readmission Assessment  Number of Days since last admission?: 8-30 days  Previous Disposition: Other (comment) ([facility] facility)  Who is being Interviewed: Patient  What was the patient's/caregiver's perception as to why they think they needed to return back to the hospital?: Other (Comment) (mt kidney labs were bad)  Did you visit your Primary Care Physician after you left the hospital, before you returned this time?: Yes  Did you see a specialist, such as Cardiac, Pulmonary, Orthopedic Physician, etc. after you left the hospital?: Yes  Who advised the patient to return to the hospital?: Physician  Does the patient report anything that got in the way of taking their medications?: No  In our efforts to provide the best possible care to you and others like you, can you think of anything that we could have done to help you after you left the hospital the first time, so that you might not have needed to return so soon?: Other (Comment) (no)      Advance Directives:

## 2024-06-20 NOTE — PROGRESS NOTES
This patient was admitted by my colleague on this calender day, a few hours before my shift began.    History of present illness from History and Physical:  69 y.o. female with a history of COPD, renal transplant in 2015 on cyclosporine, mycophenolate and prednisone, HTN, HLD, hypothyroid, hyperparathyroid, combined systolic and diastolic heart failure, AFib, PVD, lymphedema presents with abnormal amylase/lipase as outpatient at nursing facility.  Was recently admitted 6/1-6/3 with drug induced pancreatitis, cyclosporine was held at that time.  Patient was transferred to  on 6/3 and d/c on 6/15 to nursing facility.  During admission at  was treated with Merrem for emphysematous pyelonephritis in transplant kidney.  During admission she was intubated, treated for possible aspiration pneumonia.  Had recurrent hypoglycemia for which tube feeding was started via dobhoff.  Had altered mental status with lethargy (baseline confused, oriented to person only), workup at  included MRI brain on 6/11 and EEG which were both negative for acute pathology.  Nephrology at  recommended continuing patient's anti rejection regimen.  Patient had outpatient labs drawn and was sent to ED because lipase and amylase were abnormal.  Workup in ED significant for lactic acid 0.3, creatinine 0.9 down from 2.5 last admission, calcium 10.0 down from 12.7 last admission, troponin 41 and 42, lipase 1211 (was 1033 last admission), WBC 7.2 down from 20.9 on day of discharge, hgb 9.9 down from 13.9 on day of discharge.  CT abd/pelvis acute pancreatitis with small peripancreatic fluid collections.  Patient is unaware of where she is at (states \"[place]\" when asked, unable to determine she is at a hospital), she is aware of the year.  She is not sure why she is here.  Denies any symptoms at this time.       1.  Possible recurrent acute pancreatitis possibly drug-induced I spoke to Dr. Deleon who agreed that the patient does not need to be

## 2024-06-20 NOTE — PROGRESS NOTES
KIDNEY TRANSPLANT  2015    PARATHYROIDECTOMY  2006    left parathyroid  implant and parathyroidectomy    TRANSESOPHAGEAL ECHOCARDIOGRAM N/A 02/28/2022    TRANSESOPHAGEAL ECHOCARDIOGRAM WITH BUBBLE STUDY performed by Rafa Renteria MD at [facility] ENDOSCOPY        FH: reviewed and updated       Problem Relation Age of Onset    Diabetes Mother     Diabetes Father     Dementia Father     Breast Cancer Sister     Breast Cancer Maternal Grandmother         SH: reviewed and updated    reports that she quit smoking about 8 years ago. Her smoking use included cigarettes. She started smoking about 28 years ago. She has a 20.0 pack-year smoking history. She has never used smokeless tobacco. She reports that she does not drink alcohol and does not use drugs.     Objective:  Vital signs for Natali was reviewed in the nursing home record.     Physical Exam:  Physical Exam  Vitals reviewed.   Constitutional:       General: She is not in acute distress.     Appearance: She is obese. She is not ill-appearing or toxic-appearing.      Comments: Appears appropriate for age   HENT:      Head: Normocephalic and atraumatic.      Right Ear: Tympanic membrane and external ear normal.      Left Ear: Tympanic membrane and external ear normal.      Ears:      Comments: Middle ear well aerated.     Nose: Nose normal.      Mouth/Throat:      Mouth: Mucous membranes are moist.      Dentition: Normal dentition. No gingival swelling or dental caries.      Pharynx: Oropharynx is clear. Uvula midline.      Comments: Gums appear healthy.   No thrush no mucositis  Eyes:      General: Vision grossly intact.      Extraocular Movements: Extraocular movements intact.      Conjunctiva/sclera: Conjunctivae normal.      Pupils: Pupils are equal, round, and reactive to light.      Comments: Fundoscopic exam is benign  Retinal vessels: Normal   Neck:      Vascular: No carotid bruit.      Comments: Thyroid is normal in size.  Carotids 2+ and equal

## 2024-06-20 NOTE — PROGRESS NOTES
Physical Therapy Initial Evaluation/Plan of Care    Room #:  0417/0417-01  Patient Name: Natali Wolff  YOB: 1955  MRN: [id number]    Date of Service: 6/20/2024     Tentative placement recommendation: Subacute Rehab  Equipment recommendation: To be determined      Evaluating Physical Therapist: Shawna Ryan, PT #[id number]      Specific Provider Orders/Date/Referring Provider :  06/20/24 0845    PT eval and treat  Start:  06/20/24 0845,   End:  06/20/24 0845,   ONE TIME,   Standing Count:  1 Occurrences,   Javi Lopez MD     06/20/24 0845    PT eval and treat  Start:  06/20/24 0845,   End:  06/20/24 0845,   ONE TIME,   Standing Count:  1 Occurrences,   Javi Lopez MD    Admitting Diagnosis:   Drug-induced acute pancreatitis without infection or necrosis [K85.30]  Acute pancreatitis, unspecified complication status, unspecified pancreatitis type [K85.90]      abnormal labs.  Surgery: none  Visit Diagnoses         Codes    Acute pancreatitis, unspecified complication status, unspecified pancreatitis type    -  Primary K85.90            Patient Active Problem List   Diagnosis    Peripheral vascular disease (HCC)    Depression    Clotting disorder (HCC)    Abnormal EKG    Kidney transplanted    Hypertension    Leg swelling    Lymphedema of both lower extremities    Acute gout involving toe of right foot    Thyroid disease    Acute renal failure with acute renal cortical necrosis superimposed on stage 4 chronic kidney disease (HCC)    Hypoxia    Acute metabolic encephalopathy    Paroxysmal atrial fibrillation (HCC)    Hypotension    LBBB (left bundle branch block)    History of DVT (deep vein thrombosis)    Chronic anticoagulation    Chronic obstructive pulmonary disease with (acute) exacerbation (HCC)    Severe obesity (BMI 35.0-39.9) with comorbidity (HCC)    Impaired fasting glucose    Stage 3b chronic kidney disease (HCC)    Chronic deep vein thrombosis (DVT) of calf  functional limits    Increase range of motion 10% of affected joints    Strength BUE:  refer to OT eval  RLE:  3/5  LLE:  3/5  Increase strength in affected mm groups by 1/3 grade   Balance Sitting EOB:  fair +  Dynamic Standing:  not assessed    Sitting EOB:  good -  Dynamic Standing: fair wheeled walker      Patient is Alert & Oriented x person and follows one step directions  with repeated cueing   Sensation:  Patient  denies numbness/tingling   Edema:  yes bilateral lower extremities   Endurance: fair       Vitals:  3 liters nasal cannula   Blood Pressure at rest  Blood Pressure during session    Heart Rate at rest 61 Heart Rate during session     SPO2 at rest 96%  SPO2 during session  %     Patient education  Patient educated on role of Physical Therapy, risks of immobility, safety and plan of care,  importance of mobility while in hospital , ankle pumps, quad set and glut set for edema control, blood clot prevention, and positioning for skin integrity and comfort     Patient response to education:   Pt verbalized understanding Pt demonstrated skill Pt requires further education in this area   Yes Partial Yes      Treatment:  Patient practiced and was instructed/facilitated in the following treatment: Patient assisted to edge of bed,   Sat edge of bed 10 minutes with Minimal progressing to supervision to increase dynamic sitting balance and activity tolerance. Attempted standing as above. Tube feed paused, Returned to bed. [place] bed to scoot to Head of bed. head of bed elevated elevated 30* tube feed resumed.      Therapeutic Exercises:  ankle pumps  x 10 reps.       At end of session, patient in bed with alarm call light and phone within reach,  all lines and tubes intact, nursing notified.      Patient would benefit from continued skilled Physical Therapy to improve functional independence and quality of life.         Patient's/ family goals   none stated    Time in  1405  Time out  1425    Total Treatment  Time  0 minutes    Evaluation time includes thorough review of current medical information, gathering information on past medical history/social history and prior level of function, completion of standardized testing/informal observation of tasks, assessment of data, and development of Plan of care and goals.     CPT codes:  Low Complexity PT evaluation (08854)  No treatment    Shawna Ryan, PT

## 2024-06-20 NOTE — ED PROVIDER NOTES
Patient is a 68 y/o female who presents to the ED via EMS from the nursing home. EMS reported the patient was sent here for abnormal labs. Records review showed an elevated amylase and lipase. Patient has had abdominal pain for the past three days. Currently, her pain is 10/10.         Review of Systems   Unable to perform ROS: Mental status change        Physical Exam  Vitals and nursing note reviewed.   Constitutional:       General: She is not in acute distress.     Appearance: She is obese.   HENT:      Head: Normocephalic and atraumatic.      Right Ear: External ear normal.      Left Ear: External ear normal.      Nose: Nose normal.      Mouth/Throat:      Mouth: Mucous membranes are moist.   Eyes:      Conjunctiva/sclera: Conjunctivae normal.      Pupils: Pupils are equal, round, and reactive to light.   Cardiovascular:      Rate and Rhythm: Normal rate and regular rhythm.      Heart sounds: No murmur heard.  Pulmonary:      Effort: Pulmonary effort is normal. No respiratory distress.      Breath sounds: Normal breath sounds. No stridor. No wheezing, rhonchi or rales.   Abdominal:      General: Bowel sounds are normal. There is no distension.      Palpations: Abdomen is soft.      Tenderness: There is no abdominal tenderness. There is no guarding.   Musculoskeletal:         General: Normal range of motion.      Cervical back: Normal range of motion and neck supple.   Skin:     General: Skin is warm and dry.      Findings: No rash.   Neurological:      Mental Status: She is alert. She is disoriented.          Procedures     MDM    History from : Patient    Limitations to history : None    Chronic Conditions: Hypertension, hyperlipidemia, cerebrovascular disease, hypothyroidism, lymphedema, congestive heart failure, end stage renal failure on dialysis, COPD, Bipolar disorder, atrial fibrillation, depression    CONSULTS: (Who and What was discussed)  None    Discussion with Other Profesionals : Admitting Team  15.0 % Final    MPV 06/01/2024 13.2 (H)  7.0 - 12.0 fL Final    Platelet, Fluorescence 06/01/2024 138  130 - 450 k/uL Final    Neutrophils % 06/01/2024 92 (H)  43.0 - 80.0 % Final    Lymphocytes % 06/01/2024 4 (L)  20.0 - 42.0 % Final    Monocytes % 06/01/2024 4  2.0 - 12.0 % Final    Eosinophils % 06/01/2024 0  0 - 6 % Final    Basophils % 06/01/2024 0  0.0 - 2.0 % Final    Neutrophils Absolute 06/01/2024 13.98 (H)  1.80 - 7.30 k/uL Final    Lymphocytes Absolute 06/01/2024 0.61 (L)  1.50 - 4.00 k/uL Final    Monocytes Absolute 06/01/2024 0.61  0.10 - 0.95 k/uL Final    Eosinophils Absolute 06/01/2024 0.00 (L)  0.05 - 0.50 k/uL Final    Basophils Absolute 06/01/2024 0.00  0.00 - 0.20 k/uL Final    RBC Morphology 06/01/2024 1+ POIKILOCYTOSIS   Final    RBC Morphology 06/01/2024 1+ STOMATOCYTES   Final    Platelet Estimate 06/01/2024 CONFIRMED   Final    Sodium 06/01/2024 132  132 - 146 mmol/L Final    Potassium 06/01/2024 4.4  3.5 - 5.0 mmol/L Final    Chloride 06/01/2024 90 (L)  98 - 107 mmol/L Final    CO2 06/01/2024 25  22 - 29 mmol/L Final    Anion Gap 06/01/2024 17 (H)  7 - 16 mmol/L Final    Glucose 06/01/2024 148 (H)  74 - 99 mg/dL Final    BUN 06/01/2024 40 (H)  6 - 23 mg/dL Final    Creatinine 06/01/2024 2.7 (H)  0.50 - 1.00 mg/dL Final    Est, Glom Filt Rate 06/01/2024 19 (L)  >60 mL/min/1.73m2 Final    Calcium 06/01/2024 15.4 (HH)  8.6 - 10.2 mg/dL Final    Total Protein 06/01/2024 6.7  6.4 - 8.3 g/dL Final    Albumin 06/01/2024 3.7  3.5 - 5.2 g/dL Final    Total Bilirubin 06/01/2024 0.5  0.0 - 1.2 mg/dL Final    Alkaline Phosphatase 06/01/2024 70  35 - 104 U/L Final    ALT 06/01/2024 9  0 - 32 U/L Final    AST 06/01/2024 18  0 - 31 U/L Final    Lactic Acid 06/01/2024 1.3  0.5 - 2.2 mmol/L Final    Lipase 06/01/2024 1,033 (H)  13 - 60 U/L Final    Magnesium 06/01/2024 1.6  1.6 - 2.6 mg/dL Final    Troponin, High Sensitivity 06/01/2024 66 (H)  0 - 9 ng/L Final    Color, UA 06/01/2024 Yellow  Yellow Final        Cholesterol, Total 129 <200 mg/dL    HDL 34 (L) >40 mg/dL    LDL Cholesterol 69 <100 mg/dL    Triglycerides 132 <150 mg/dL    VLDL 26 mg/dL   Magnesium   Result Value Ref Range    Magnesium 1.9 1.6 - 2.6 mg/dL   EKG 12 Lead   Result Value Ref Range    Ventricular Rate 62 BPM    Atrial Rate 62 BPM    P-R Interval 188 ms    QRS Duration 144 ms    Q-T Interval 444 ms    QTc Calculation (Bazett) 450 ms    P Axis 68 degrees    R Axis -59 degrees    T Axis 126 degrees       RADIOLOGY:  CT ABDOMEN PELVIS W IV CONTRAST Additional Contrast? None   Final Result   1. Acute pancreatitis with small peripancreatic fluid collections.   2. Bilateral renal atrophy with numerous bilateral renal cysts.   3. Right lower quadrant renal transplant without transplant hydronephrosis or   perinephric fluid collection.         CT HEAD WO CONTRAST   Final Result   No acute intracranial abnormality.             EKG:  This EKG is signed and interpreted by me.    Rate: 62  Rhythm: Sinus  Interpretation: non-specific EKG and left bundle branch block  Comparison: changes compared to previous EKG      ------------------------- NURSING NOTES AND VITALS REVIEWED ---------------------------  Date / Time Roomed:  6/19/2024  9:57 PM  ED Bed Assignment:  05/05    The nursing notes within the ED encounter and vital signs as below have been reviewed.     Patient Vitals for the past 24 hrs:   BP Temp Temp src Pulse Resp SpO2 Height Weight   06/20/24 0000 133/63 -- -- 65 -- 98 % -- --   06/19/24 2330 126/64 -- -- 64 -- 94 % -- --   06/19/24 2147 (!) 141/73 97.4 °F (36.3 °C) Oral 67 20 100 % 1.549 m (5' 1\") 96.6 kg (213 lb)       Oxygen Saturation Interpretation: Normal    ------------------------------------------ PROGRESS NOTES ------------------------------------------  Re-evaluation(s):  Time: 0200.  Patient’s symptoms show no change  Repeat physical examination is not changed    Counseling:  I have spoken with the patient and discussed today’s

## 2024-06-20 NOTE — PROGRESS NOTES
4 Eyes Skin Assessment     NAME:  Natali Wolff  YOB: 1955  MEDICAL RECORD NUMBER:  [id number]    The patient is being assessed for  Admission    I agree that at least one RN has performed a thorough Head to Toe Skin Assessment on the patient. ALL assessment sites listed below have been assessed.      Areas assessed by both nurses:    Head, Face, Ears, Shoulders, Back, Chest, Arms, Elbows, Hands, Sacrum. Buttock, Coccyx, Ischium, and Legs. Feet and Heels        Does the Patient have a Wound? No noted wound(s)       Nile Prevention initiated by RN: No  Wound Care Orders initiated by RN: No    Pressure Injury (Stage 3,4, Unstageable, DTI, NWPT, and Complex wounds) if present, place Wound referral order by RN under : No    New Ostomies, if present place, Ostomy referral order under : No     Nurse 1 eSignature: Electronically signed by Rigo Scales RN on 6/20/24 at 5:18 AM EDT    **SHARE this note so that the co-signing nurse can place an eSignature**    Nurse 2 eSignature: Electronically signed by Cinthya Abebe LPN on 6/20/24 at 5:19 AM EDT

## 2024-06-20 NOTE — CONSULTS
Comprehensive Nutrition Assessment    Type and Reason for Visit:  Initial, Consult (TFOM)    Nutrition Recommendations/Plan:   Continue NPO  Tube Feeding Recommendations:    Recommend Jevity 1.5 @ 50ml/h x 22 hours (Held 60 minutes before & after synthroid) with 200ml fwf flush q6 hours. Regimen to provide 1100 mls TV, 1650kcals,70g protein, 1600 mls water meeting 100% of pt's calorie, protein and fluid needs per day.      Malnutrition Assessment:  Malnutrition Status:  At risk for malnutrition (Comment) (enteral nutrition at baseline) (06/20/24 0854)    Context:  Chronic Illness     Findings of the 6 clinical characteristics of malnutrition:  Energy Intake:  No significant decrease in energy intake  Weight Loss:  No significant weight loss     Body Fat Loss:  No significant body fat loss     Muscle Mass Loss:  No significant muscle mass loss    Fluid Accumulation:  No significant fluid accumulation     Strength:  Not Performed    Nutrition Assessment:    Pt admit for drug-induced pancreatitis, bilateral renal cysts, anemia, A-fib. Pt underwent kidney transplant in 2015. Pt was hospitalized at [facility] 06/01- 06/03/2024 for pancreatitis, pt transfered to  for emphysematous pyelonephritis and intubated for possible aspiration PNA. PMHx: Renal transplant, HIV, Chronic DVT, COPD, CHF, hypothyroid, PVD, thyroid cancer, Lynphedema, A-Fib, HLD/HTN,Bi-polar, CVD. Pt is NPO. RD consulted for Tube Feeding Rec's. Pt on tube feeding at baseline. This RD spoke w/ nurse at nursing home where pt resides, pt on Jevity 1.5 @45mls/hr. Will provide recommendations, continue inpatient monitoring, f/up per policy.    Nutrition Related Findings:    A/O x2, I/O not recorded, abd wdl, bowel sounds active x4, NG tube in place, CO2 32, Anion Gap 6, Lactic Acid 0.3, Troponin 42, HDL 34, Lipase 1,211, RBC 2..81, Hgb 9.9, Hct 32.0 Wound Type: None       Current Nutrition Intake & Therapies:    Average Meal Intake: NPO  Average

## 2024-06-20 NOTE — DISCHARGE INSTR - COC
Continuity of Care Form    Patient Name: Natali Wolff   :  1955  MRN:  [id number]    Admit date:  2024  Discharge date:  ***    Code Status Order: Full Code   Advance Directives:     Admitting Physician:  Kateryna Escalante DO  PCP: Brennon Malave DO    Discharging Nurse: ***  Discharging Hospital Unit/Room#: 0417/0417-01  Discharging Unit Phone Number: ***    Emergency Contact:   Extended Emergency Contact Information  Primary Emergency Contact: Mayur Cheema   [facility]  Home Phone: [phone]  Mobile Phone: [phone]  Relation: Child   needed? No  Secondary Emergency Contact: Gita Russell  Home Phone: [phone]  Mobile Phone: [phone]  Relation: Other Relative  Preferred language: English   needed? No    Past Surgical History:  Past Surgical History:   Procedure Laterality Date    CATARACT REMOVAL Right 2023    approx     SECTION  1985    one normal delivery    COLECTOMY N/A 01/15/2022    DIAGNOSTIC  LAPAROSCOPY LYSIS OF ADHESIONS performed by Carl Rodríguez MD at [facility] OR    COLONOSCOPY      DIAGNOSTIC CARDIAC CATH LAB PROCEDURE      normal coronaries and 1996 normal coronaries    DIALYSIS FISTULA CREATION  march and 2012    phase one and two patient will start dialysis when needed    FEMORAL BYPASS      transfemoral venous bypass grafts    IR IVC FILTER PLACEMENT W IMAGING  2022    IR IVC FILTER PLACEMENT W IMAGING 2022 [facility] SPECIAL PROCEDURES    KIDNEY TRANSPLANT  2016    KIDNEY TRANSPLANT  2015    KIDNEY TRANSPLANT  2015    PARATHYROIDECTOMY  2006    left parathyroid  implant and parathyroidectomy    TRANSESOPHAGEAL ECHOCARDIOGRAM N/A 2022    TRANSESOPHAGEAL ECHOCARDIOGRAM WITH BUBBLE STUDY performed by Rafa Renteria MD at [facility] ENDOSCOPY       Immunization History:   Immunization History   Administered Date(s) Administered    COVID-19, MODERNA, ( formula), (age 12y+), IM, 50mcg/0.5mL

## 2024-06-20 NOTE — PLAN OF CARE
Problem: Skin/Tissue Integrity  Goal: Absence of new skin breakdown  Description: 1.  Monitor for areas of redness and/or skin breakdown  2.  Assess vascular access sites hourly  3.  Every 4-6 hours minimum:  Change oxygen saturation probe site  4.  Every 4-6 hours:  If on nasal continuous positive airway pressure, respiratory therapy assess nares and determine need for appliance change or resting period.  Outcome: Progressing  Note: No skin breakdown noticed on admission.   Frequent turns initiated      Problem: Safety - Adult  Goal: Free from fall injury  Outcome: Progressing     Problem: ABCDS Injury Assessment  Goal: Absence of physical injury  Outcome: Progressing

## 2024-06-21 ENCOUNTER — APPOINTMENT (OUTPATIENT)
Dept: MRI IMAGING | Age: 69
End: 2024-06-21
Payer: MEDICARE

## 2024-06-21 LAB
ANION GAP SERPL CALCULATED.3IONS-SCNC: 5 MMOL/L (ref 7–16)
BASOPHILS # BLD: 0.07 K/UL (ref 0–0.2)
BASOPHILS NFR BLD: 1 % (ref 0–2)
BUN SERPL-MCNC: 20 MG/DL (ref 6–23)
CA-I BLD-SCNC: 1.36 MMOL/L (ref 1.15–1.33)
CALCIUM SERPL-MCNC: 10.5 MG/DL (ref 8.6–10.2)
CHLORIDE SERPL-SCNC: 98 MMOL/L (ref 98–107)
CO2 SERPL-SCNC: 34 MMOL/L (ref 22–29)
CREAT SERPL-MCNC: 0.8 MG/DL (ref 0.5–1)
EOSINOPHIL # BLD: 0.13 K/UL (ref 0.05–0.5)
EOSINOPHILS RELATIVE PERCENT: 2 % (ref 0–6)
ERYTHROCYTE [DISTWIDTH] IN BLOOD BY AUTOMATED COUNT: 14.6 % (ref 11.5–15)
FERRITIN SERPL-MCNC: 772 NG/ML
GFR, ESTIMATED: 77 ML/MIN/1.73M2
GLUCOSE SERPL-MCNC: 109 MG/DL (ref 74–99)
HCT VFR BLD AUTO: 31.8 % (ref 34–48)
HGB BLD-MCNC: 9.8 G/DL (ref 11.5–15.5)
IRON SATN MFR SERPL: 29 % (ref 15–50)
IRON SERPL-MCNC: 63 UG/DL (ref 37–145)
LYMPHOCYTES NFR BLD: 1.28 K/UL (ref 1.5–4)
LYMPHOCYTES RELATIVE PERCENT: 17 % (ref 20–42)
MCH RBC QN AUTO: 34.8 PG (ref 26–35)
MCHC RBC AUTO-ENTMCNC: 30.8 G/DL (ref 32–34.5)
MCV RBC AUTO: 112.8 FL (ref 80–99.9)
METAMYELOCYTES ABSOLUTE COUNT: 0.07 K/UL (ref 0–0.12)
METAMYELOCYTES: 1 % (ref 0–1)
MONOCYTES NFR BLD: 0.4 K/UL (ref 0.1–0.95)
MONOCYTES NFR BLD: 5 % (ref 2–12)
MYELOCYTES ABSOLUTE COUNT: 0.07 K/UL
MYELOCYTES: 1 %
NEUTROPHILS NFR BLD: 73 % (ref 43–80)
NEUTS SEG NFR BLD: 5.58 K/UL (ref 1.8–7.3)
PLATELET, FLUORESCENCE: 213 K/UL (ref 130–450)
PMV BLD AUTO: 13.6 FL (ref 7–12)
POTASSIUM SERPL-SCNC: 4.6 MMOL/L (ref 3.5–5)
RBC # BLD AUTO: 2.82 M/UL (ref 3.5–5.5)
RBC # BLD: NORMAL 10*6/UL
SODIUM SERPL-SCNC: 137 MMOL/L (ref 132–146)
TIBC SERPL-MCNC: 219 UG/DL (ref 250–450)
WBC OTHER # BLD: 7.6 K/UL (ref 4.5–11.5)

## 2024-06-21 PROCEDURE — 6360000002 HC RX W HCPCS: Performed by: HOSPITALIST

## 2024-06-21 PROCEDURE — 83550 IRON BINDING TEST: CPT

## 2024-06-21 PROCEDURE — 85025 COMPLETE CBC W/AUTO DIFF WBC: CPT

## 2024-06-21 PROCEDURE — 6370000000 HC RX 637 (ALT 250 FOR IP): Performed by: FAMILY MEDICINE

## 2024-06-21 PROCEDURE — 36415 COLL VENOUS BLD VENIPUNCTURE: CPT

## 2024-06-21 PROCEDURE — 80048 BASIC METABOLIC PNL TOTAL CA: CPT

## 2024-06-21 PROCEDURE — 2580000003 HC RX 258: Performed by: FAMILY MEDICINE

## 2024-06-21 PROCEDURE — C9113 INJ PANTOPRAZOLE SODIUM, VIA: HCPCS | Performed by: HOSPITALIST

## 2024-06-21 PROCEDURE — 1200000000 HC SEMI PRIVATE

## 2024-06-21 PROCEDURE — 82330 ASSAY OF CALCIUM: CPT

## 2024-06-21 PROCEDURE — 74183 MRI ABD W/O CNTR FLWD CNTR: CPT

## 2024-06-21 PROCEDURE — 83540 ASSAY OF IRON: CPT

## 2024-06-21 PROCEDURE — 99232 SBSQ HOSP IP/OBS MODERATE 35: CPT | Performed by: INTERNAL MEDICINE

## 2024-06-21 PROCEDURE — 2580000003 HC RX 258: Performed by: INTERNAL MEDICINE

## 2024-06-21 PROCEDURE — A9577 INJ MULTIHANCE: HCPCS | Performed by: RADIOLOGY

## 2024-06-21 PROCEDURE — 97165 OT EVAL LOW COMPLEX 30 MIN: CPT

## 2024-06-21 PROCEDURE — 6360000002 HC RX W HCPCS: Performed by: INTERNAL MEDICINE

## 2024-06-21 PROCEDURE — 2580000003 HC RX 258: Performed by: HOSPITALIST

## 2024-06-21 PROCEDURE — 97530 THERAPEUTIC ACTIVITIES: CPT

## 2024-06-21 PROCEDURE — 6360000004 HC RX CONTRAST MEDICATION: Performed by: RADIOLOGY

## 2024-06-21 PROCEDURE — 82728 ASSAY OF FERRITIN: CPT

## 2024-06-21 PROCEDURE — 2700000000 HC OXYGEN THERAPY PER DAY

## 2024-06-21 RX ORDER — MYCOPHENOLATE MOFETIL 200 MG/ML
1000 POWDER, FOR SUSPENSION ORAL 2 TIMES DAILY
Status: DISCONTINUED | OUTPATIENT
Start: 2024-06-21 | End: 2024-06-21

## 2024-06-21 RX ORDER — MYCOPHENOLATE MOFETIL 200 MG/ML
1000 POWDER, FOR SUSPENSION ORAL 2 TIMES DAILY
Status: DISCONTINUED | OUTPATIENT
Start: 2024-06-21 | End: 2024-06-22 | Stop reason: HOSPADM

## 2024-06-21 RX ADMIN — MYCOPHENOLATE MOFETIL 1000 MG: 200 POWDER, FOR SUSPENSION ORAL at 22:13

## 2024-06-21 RX ADMIN — METOPROLOL TARTRATE 12.5 MG: 25 TABLET, FILM COATED ORAL at 22:19

## 2024-06-21 RX ADMIN — Medication 1000 UNITS: at 17:04

## 2024-06-21 RX ADMIN — PREDNISONE 5 MG: 5 TABLET ORAL at 17:04

## 2024-06-21 RX ADMIN — PANTOPRAZOLE SODIUM 40 MG: 40 INJECTION, POWDER, FOR SOLUTION INTRAVENOUS at 22:14

## 2024-06-21 RX ADMIN — Medication 10 ML: at 22:14

## 2024-06-21 RX ADMIN — MYCOPHENOLATE MOFETIL 1000 MG: 200 POWDER, FOR SUSPENSION ORAL at 17:06

## 2024-06-21 RX ADMIN — LEVOTHYROXINE SODIUM 50 MCG: 0.05 TABLET ORAL at 17:09

## 2024-06-21 RX ADMIN — SODIUM CHLORIDE, POTASSIUM CHLORIDE, SODIUM LACTATE AND CALCIUM CHLORIDE: 600; 310; 30; 20 INJECTION, SOLUTION INTRAVENOUS at 10:40

## 2024-06-21 RX ADMIN — DOCUSATE SODIUM 100 MG: 50 LIQUID ORAL at 22:13

## 2024-06-21 RX ADMIN — GADOBENATE DIMEGLUMINE 15 ML: 529 INJECTION, SOLUTION INTRAVENOUS at 13:39

## 2024-06-21 RX ADMIN — FERROUS SULFATE TAB 325 MG (65 MG ELEMENTAL FE) 325 MG: 325 (65 FE) TAB at 17:04

## 2024-06-21 RX ADMIN — APIXABAN 5 MG: 5 TABLET, FILM COATED ORAL at 22:14

## 2024-06-21 RX ADMIN — PANTOPRAZOLE SODIUM 40 MG: 40 INJECTION, POWDER, FOR SOLUTION INTRAVENOUS at 10:42

## 2024-06-21 RX ADMIN — Medication 10 ML: at 10:30

## 2024-06-21 RX ADMIN — FOLIC ACID 1 MG: 1 TABLET ORAL at 17:05

## 2024-06-21 RX ADMIN — SODIUM CHLORIDE, POTASSIUM CHLORIDE, SODIUM LACTATE AND CALCIUM CHLORIDE: 600; 310; 30; 20 INJECTION, SOLUTION INTRAVENOUS at 16:23

## 2024-06-21 RX ADMIN — ATORVASTATIN CALCIUM 20 MG: 20 TABLET, FILM COATED ORAL at 17:03

## 2024-06-21 ASSESSMENT — PAIN SCALES - GENERAL
PAINLEVEL_OUTOF10: 0

## 2024-06-21 NOTE — PROGRESS NOTES
OCCUPATIONAL THERAPY INITIAL EVALUATION    [facility]  [address] [place]. [place]        Date:2024                                                  Patient Name: Natali Wolff    MRN: [id number]    : 1955    Room: [address] [place] [address]      Evaluating OT: Lanette Marion OTR/L; [id number]     Referring Provider and Specific Provider Orders/Date:      24  OT eval and treat  Start:  24,   End:  2445,   ONE TIME,   Standing Count:  1 Occurrences,   R         Javi Bose MD      Placement Recommendation: Subacute        Diagnosis:   1. Acute pancreatitis, unspecified complication status, unspecified pancreatitis type         Surgery: none       Pertinent Medical History:       Past Medical History:   Diagnosis Date    Abnormal EKG     left bundle branch block    Acute cystitis without hematuria 2023    Acute cystitis without hematuria 2023    Acute exacerbation of chronic obstructive pulmonary disease (COPD) (HCC) 2022    Acute gout involving toe of right foot 2020    Acute on chronic combined systolic (congestive) and diastolic (congestive) heart failure (HCC)     asthmatic bronchitis 2019    Bipolar disorder (HCC) 2018    Cancer (HCC)     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    Cerebrovascular disease     Clotting disorder (HCC) 1985    thrombophlebitis after c section delivery    Depression     15 years followed by dr zuluaga    Hemodialysis patient (HCC)     History of blood transfusion     History of renal transplant 10/2015     in [place] Dr Oscar Wilkinson    Hyperlipidemia     Hypertension     Hypothyroidism     Hypothyroidism 2018    Leg swelling 2020    Leukocytosis 01/10/2022    Lymphedema of both lower extremities 2020    Other emphysema (HCC) 2024    Paroxysmal atrial fibrillation (HCC)     Peripheral vascular disease (HCC)     Renal  conservation strategies, correct breathing pattern and techniques to improve independence/tolerance for self-care routine  * Functional transfer/mobility training/DME recommendations for increased independence, safety, and fall prevention  * Patient/Family education to increase follow through with safety techniques and functional independence  * Recommendation of environmental modifications for increased safety with functional transfers/mobility and ADLs  * Therapeutic exercise to improve motor endurance, ROM, and functional strength for ADLs/functional transfers  * Therapeutic activities to facilitate/challenge dynamic balance, stand tolerance for increased safety and independence with ADLs  * Positioning to improve skin integrity, interaction with environment and functional independence    Recommended Adaptive Equipment: TBD at rehab     Home Living: Pt came [facility] us from [facility] per chart. Pt states she came from home.        Equipment owned: SNF equipment     Prior Level of Function: needs assistance with ADLs and IADLs     Pain Level: no c/o pain at time of evaluation; Nursing notified.      Cognition: A&O: 4/4; Follows 3 step directions   Memory: poor   Sequencing: poor   Problem solving: poor   Judgement/safety: poor    [facility]   AM-PAC Daily Activity - Inpatient   How much help is needed for putting on and taking off regular lower body clothing?: Total  How much help is needed for bathing (which includes washing, rinsing, drying)?: A Lot  How much help is needed for toileting (which includes using toilet, bedpan, or urinal)?: Total  How much help is needed for putting on and taking off regular upper body clothing?: A Lot  How much help is needed for taking care of personal grooming?: A Lot  How much help for eating meals?: A Little  AM-[facility] Inpatient Daily Activity Raw Score: 11  AM-PAC Inpatient ADL T-Scale Score : 29.04  ADL Inpatient CMS 0-100% Score: 70.42  ADL Inpatient CMS G-Code Modifier  : CL     Functional Assessment:    Initial Eval Status  Date: 6/21/24 Treatment Status  Date: STGs = LTGs  Time frame: 10-14 days   Feeding Supervision   Independent    Grooming Moderate Assist   Supervision    UB Dressing Moderate Assist   Supervision    LB Dressing Dependent   Minimal Assist    Bathing Maximal Assist  Minimal Assist    Toileting Dependent   Minimal Assist    Bed Mobility  Rolling: Minimal Assist x 4 reps.   Supine to sit: N/T as pt adamantly refused   Sit to supine: N/T as pt adamantly refused   Maximal Assist x 2 to transfer pt from transport cart to hospital bed  Maximal Assist for positioning in bed for symmetry and comfort, B LE elevated on a pillow with heels floating.     Supine to sit: Minimal Assist  Sit to supine: Minimal Assist   Rolling: Minimal Assist      Functional Transfers N/T as pt refused.  Minimal Assist    Functional Mobility N/T as pt refused  Minimal Assist    Balance Sitting:     Static: N/T     Dynamic: N/T   Standing: N/T    Sitting:     Static: fair     Dynamic: fair   Standing: fair  with wheeled walker   Activity Tolerance Poor  good    Visual/  Perceptual Glasses: no                Hand Dominance: right      AROM (PROM) Strength Additional Info:  Goal:   RUE  WFL 4/5 good  and wfl FMC/dexterity noted during ADL tasks   Improve overall RUE strength  for participation in functional tasks   LUE WFL 4/5 good  and wfl FMC/dexterity noted during ADL tasks   Improve overall LUE strength  for participation in functional tasks     Hearing: WFL   Sensation:  No c/o numbness or tingling  Tone: WFL   Edema: no    Comments: Upon arrival the patient was on transport cart.  At end of session, patient was supine in bed with HOB elevated and B LE elevated on a pillow with heels floating, call light and phone within reach, all lines and tubes intact.  Overall patient demonstrated decreased independence and safety during completion of ADL/functional transfer/mobility tasks.  Pt

## 2024-06-21 NOTE — PROGRESS NOTES
Physical Therapy  Physical Therapy    Room #:   0417/0417-01    Date: 2024       Patient Name: Natali Wolff  : 1955      MRN: [id number]     Patient unavailable for physical therapy treatment due to  patient not feeling good and tired . Will attempt PT treatment tomorrow. Thank you.      Antione Parra  [facility]  LIC # [id number]

## 2024-06-21 NOTE — PROGRESS NOTES
Admitting Date and Time: 6/19/2024  9:57 PM  Admit Dx: Drug-induced acute pancreatitis without infection or necrosis [K85.30]  Acute pancreatitis, unspecified complication status, unspecified pancreatitis type [K85.90]    Subjective:    Pt feels well no new complaint  Per RN: No issues    ROS: denies fever, chills, cp, sob, n/v, HA unless stated above.     mycophenolate  1,000 mg Oral BID    sodium chloride flush  5-40 mL IntraVENous 2 times per day    apixaban  5 mg Oral BID    ferrous sulfate  325 mg Oral Daily with breakfast    folic acid  1 mg Oral Daily    levothyroxine  50 mcg Oral Daily    metoprolol tartrate  12.5 mg Oral BID    predniSONE  5 mg Oral Daily    atorvastatin  20 mg Oral Daily    Vitamin D  1,000 Units Oral Daily    pantoprazole (PROTONIX) 40 mg in sodium chloride (PF) 0.9 % 10 mL injection  40 mg IntraVENous Q12H    docusate  100 mg Oral QHS     sodium chloride flush, 5-40 mL, PRN  sodium chloride, , PRN  ondansetron, 4 mg, Q8H PRN   Or  ondansetron, 4 mg, Q6H PRN  ipratropium 0.5 mg-albuterol 2.5 mg, 1 Dose, Q4H PRN         Objective:    /63   Pulse 64   Temp 97.8 °F (36.6 °C) (Oral)   Resp 20   Ht 1.549 m (5' 0.98\")   Wt 93 kg (205 lb 1.6 oz)   SpO2 96%   BMI 38.77 kg/m²   General Appearance: alert and oriented to person, place and time and in no acute distress  Skin: warm and dry  Head: normocephalic and atraumatic  Eyes: pupils equal, round, and reactive to light, extraocular eye movements intact, conjunctivae normal  Neck: neck supple and non tender without mass   Pulmonary/Chest: clear to auscultation bilaterally- no wheezes, rales or rhonchi, normal air movement, no respiratory distress  Cardiovascular: normal rate, normal S1 and S2 and no carotid bruits  Abdomen: soft, non-tender, non-distended, normal bowel sounds, no masses or organomegaly  Extremities: no cyanosis, no clubbing and no  edema  Neurologic: no cranial nerve deficit and speech normal      Recent Labs      06/19/24 2256 06/21/24  0540    137   K 5.0 4.6   CL 99 98   CO2 32* 34*   BUN 24* 20   CREATININE 0.9 0.8   GLUCOSE 90 109*   CALCIUM 10.0 10.5*       Recent Labs     06/19/24 2256   ALKPHOS 100   BILITOT 0.5   AST 19   ALT 19       Recent Labs     06/19/24 2256 06/21/24  0540   WBC 7.2 7.6   RBC 2.81* 2.82*   HGB 9.9* 9.8*   HCT 32.0* 31.8*   .9* 112.8*   MCH 35.2* 34.8   MCHC 30.9* 30.8*   RDW 14.4 14.6   MPV 13.7* 13.6*           Radiology:   MRI ABDOMEN W WO CONTRAST MRCP   Final Result   1. Acute interstitial edematous pancreatitis. Areas of relative peripheral   enhancement and T2 hyperintense signal cystic lesions greatest in the   pancreatic head could represent pseudocysts versus IPMNs with attention on   follow-up.  Consider MRCP with and without contrast at 3-6 months to evaluate   for stability and/or resolution of cystic lesions.  Continued correlation   with lipase values.   2. No evidence for cholelithiasis or choledocholithiasis.   3. Trace bilateral pleural effusions.   4. Additional findings as described above.         CT ABDOMEN PELVIS W IV CONTRAST Additional Contrast? None   Final Result   1. Acute pancreatitis with small peripancreatic fluid collections.   2. Bilateral renal atrophy with numerous bilateral renal cysts.   3. Right lower quadrant renal transplant without transplant hydronephrosis or   perinephric fluid collection.         CT HEAD WO CONTRAST   Final Result   No acute intracranial abnormality.             Assessment:  Principal Problem:    Acute pancreatitis  Resolved Problems:    * No resolved hospital problems. *      Plan: History of present illness from History and Physical:  69 y.o. female with a history of COPD, renal transplant in 2015 on cyclosporine, mycophenolate and prednisone, HTN, HLD, hypothyroid, hyperparathyroid, combined systolic and diastolic heart failure, AFib, PVD, lymphedema presents with abnormal amylase/lipase as outpatient at [facility]

## 2024-06-21 NOTE — PROGRESS NOTES
PROGRESS NOTE  By Gonzalo Flores M.D.    [facility]  Dr. Keny Mcallister M.D.,  Dr. Dane Sofia M.D.,   Dr. Gómez Gramajo D.O.,  Dr. Chris Fowler M.D.,  STEPHANIE LorenzoO.,          Natali Wolff  69 y.o.  female    SUBJECTIVE:  Denies any significant abdominal pain.  Denies nausea vomiting    OBJECTIVE:    /63   Pulse 64   Temp 97.8 °F (36.6 °C) (Oral)   Resp 20   Ht 1.549 m (5' 0.98\")   Wt 93 kg (205 lb 1.6 oz)   SpO2 96%   BMI 38.77 kg/m²     General: NAD/obese adult  female  HEENT: Anicteric sclera/moist oral mucosa.  Nasoenteric feeding tube in place  Neck: Supple with trachea midline  Chest: Metrical excursion/nonlabored respirations  Cor: Regular/S1-S2  Abd.: Soft and obese.  Appears nontender  Extr.:  Bilateral lower extremity edema.  Decreased muscle tone and bulk throughout  Skin: Warm and dry/anicteric      DATA:    Monitor data reviewed     Lab Results   Component Value Date/Time    WBC 7.6 06/21/2024 05:40 AM    RBC 2.82 06/21/2024 05:40 AM    HGB 9.8 06/21/2024 05:40 AM    HCT 31.8 06/21/2024 05:40 AM    .8 06/21/2024 05:40 AM    MCH 34.8 06/21/2024 05:40 AM    MCHC 30.8 06/21/2024 05:40 AM    RDW 14.6 06/21/2024 05:40 AM     05/08/2024 10:40 AM    MPV 13.6 06/21/2024 05:40 AM     Lab Results   Component Value Date/Time     06/21/2024 05:40 AM    K 4.6 06/21/2024 05:40 AM    K 4.6 10/07/2022 12:45 PM    CL 98 06/21/2024 05:40 AM    CO2 34 06/21/2024 05:40 AM    BUN 20 06/21/2024 05:40 AM    CREATININE 0.8 06/21/2024 05:40 AM    CALCIUM 10.5 06/21/2024 05:40 AM    BILITOT 0.5 06/19/2024 10:56 PM    ALKPHOS 100 06/19/2024 10:56 PM    AST 19 06/19/2024 10:56 PM    ALT 19 06/19/2024 10:56 PM     Lab Results   Component Value Date/Time    LIPASE 1,211 06/19/2024 10:56 PM     No results found for: \"AMYLASE\"      ASSESSMENT/PLAN:  Patient Active Problem List   Diagnosis    Peripheral vascular disease (HCC)    Depression    Clotting  disorder (HCC)    Abnormal EKG    Kidney transplanted    Hypertension    Leg swelling    Lymphedema of both lower extremities    Acute gout involving toe of right foot    Thyroid disease    Acute renal failure with acute renal cortical necrosis superimposed on stage 4 chronic kidney disease (HCC)    Hypoxia    Acute metabolic encephalopathy    Paroxysmal atrial fibrillation (HCC)    Hypotension    LBBB (left bundle branch block)    History of DVT (deep vein thrombosis)    Chronic anticoagulation    Chronic obstructive pulmonary disease with (acute) exacerbation (HCC)    Severe obesity (BMI 35.0-39.9) with comorbidity (HCC)    Impaired fasting glucose    Stage 3b chronic kidney disease (HCC)    Chronic deep vein thrombosis (DVT) of calf muscle vein of left lower extremity (HCC)    History of herpes encephalitis    Mixed hyperlipidemia    Acquired hypothyroidism    Bipolar 1 disorder (HCC)    Acute cystitis without hematuria    Age-related osteoporosis without current pathological fracture    Chronic systolic (congestive) heart failure    Major depressive disorder, recurrent, mild    Acute pancreatitis    Other emphysema (HCC)    Hypercalcemia     1.  Pancreatitis  -Acute/recurrent  -Nonobstructive liver profile  -No significant hypercalcemia on this admission  -No significant hypertriglyceridemia  -No alcohol abuse  -Questionable drug-induced -mycophenolate, prednisone?  -MRI pending  -Consider further evaluation with EUS as outpatient  -Continue postpyloric feelings, otherwise keep n.p.o.     2.  Anemia  -New anemia without clear evidence of overt bleed  -Likely precipitated/exacerbated by oral anticoagulation  -Stable H&H at  -Deficient  -Pending FOBT  -Continue twice a day IV PPI  -Monitor H&H; defer transfusion to admitting  -Endoscopic evaluation in case of precipitous drop in H&H or overt bleed -see discussion below     3.  Renal failure/history of renal transplant  -Per admitting/nephrology     4.

## 2024-06-21 NOTE — PLAN OF CARE
Problem: Pain  Goal: Verbalizes/displays adequate comfort level or baseline comfort level  Outcome: Progressing     Problem: Skin/Tissue Integrity  Goal: Absence of new skin breakdown  Outcome: Progressing     Problem: Safety - Adult  Goal: Free from fall injury  Outcome: Progressing     Problem: ABCDS Injury Assessment  Goal: Absence of physical injury  Outcome: Progressing

## 2024-06-22 VITALS
BODY MASS INDEX: 38.72 KG/M2 | HEART RATE: 76 BPM | TEMPERATURE: 97.9 F | RESPIRATION RATE: 18 BRPM | OXYGEN SATURATION: 99 % | SYSTOLIC BLOOD PRESSURE: 152 MMHG | HEIGHT: 61 IN | DIASTOLIC BLOOD PRESSURE: 70 MMHG | WEIGHT: 205.1 LBS

## 2024-06-22 LAB
EKG ATRIAL RATE: 62 BPM
EKG P AXIS: 68 DEGREES
EKG P-R INTERVAL: 188 MS
EKG Q-T INTERVAL: 444 MS
EKG QRS DURATION: 144 MS
EKG QTC CALCULATION (BAZETT): 450 MS
EKG R AXIS: -59 DEGREES
EKG T AXIS: 126 DEGREES
EKG VENTRICULAR RATE: 62 BPM
GLUCOSE BLD-MCNC: 100 MG/DL (ref 74–99)
GLUCOSE BLD-MCNC: 158 MG/DL (ref 74–99)
GLUCOSE BLD-MCNC: 195 MG/DL (ref 74–99)

## 2024-06-22 PROCEDURE — 6370000000 HC RX 637 (ALT 250 FOR IP): Performed by: FAMILY MEDICINE

## 2024-06-22 PROCEDURE — 93010 ELECTROCARDIOGRAM REPORT: CPT | Performed by: INTERNAL MEDICINE

## 2024-06-22 PROCEDURE — C9113 INJ PANTOPRAZOLE SODIUM, VIA: HCPCS | Performed by: HOSPITALIST

## 2024-06-22 PROCEDURE — 82962 GLUCOSE BLOOD TEST: CPT

## 2024-06-22 PROCEDURE — 2580000003 HC RX 258: Performed by: FAMILY MEDICINE

## 2024-06-22 PROCEDURE — 6360000002 HC RX W HCPCS: Performed by: HOSPITALIST

## 2024-06-22 PROCEDURE — 6360000002 HC RX W HCPCS: Performed by: INTERNAL MEDICINE

## 2024-06-22 PROCEDURE — 99239 HOSP IP/OBS DSCHRG MGMT >30: CPT | Performed by: INTERNAL MEDICINE

## 2024-06-22 PROCEDURE — 2580000003 HC RX 258: Performed by: HOSPITALIST

## 2024-06-22 RX ADMIN — FERROUS SULFATE TAB 325 MG (65 MG ELEMENTAL FE) 325 MG: 325 (65 FE) TAB at 08:44

## 2024-06-22 RX ADMIN — Medication 10 ML: at 08:43

## 2024-06-22 RX ADMIN — MYCOPHENOLATE MOFETIL 1000 MG: 200 POWDER, FOR SUSPENSION ORAL at 09:00

## 2024-06-22 RX ADMIN — FOLIC ACID 1 MG: 1 TABLET ORAL at 08:44

## 2024-06-22 RX ADMIN — PANTOPRAZOLE SODIUM 40 MG: 40 INJECTION, POWDER, FOR SOLUTION INTRAVENOUS at 08:43

## 2024-06-22 RX ADMIN — ATORVASTATIN CALCIUM 20 MG: 20 TABLET, FILM COATED ORAL at 08:44

## 2024-06-22 RX ADMIN — METOPROLOL TARTRATE 12.5 MG: 25 TABLET, FILM COATED ORAL at 08:44

## 2024-06-22 RX ADMIN — PREDNISONE 5 MG: 5 TABLET ORAL at 08:44

## 2024-06-22 RX ADMIN — LEVOTHYROXINE SODIUM 50 MCG: 0.05 TABLET ORAL at 08:44

## 2024-06-22 RX ADMIN — Medication 1000 UNITS: at 08:43

## 2024-06-22 RX ADMIN — APIXABAN 5 MG: 5 TABLET, FILM COATED ORAL at 08:45

## 2024-06-22 NOTE — PROGRESS NOTES
Call to son, Mayur, to notify of patient's impending 1400 pick-up time.    Nurse to nurse report called to [facility].

## 2024-06-22 NOTE — PLAN OF CARE
Problem: Pain  Goal: Verbalizes/displays adequate comfort level or baseline comfort level  6/21/2024 2356 by Eleonora Virk RN  Outcome: Progressing  6/21/2024 1231 by Cinthya Maxwell RN  Outcome: Progressing     Problem: Skin/Tissue Integrity  Goal: Absence of new skin breakdown  Description: 1.  Monitor for areas of redness and/or skin breakdown  2.  Assess vascular access sites hourly  3.  Every 4-6 hours minimum:  Change oxygen saturation probe site  4.  Every 4-6 hours:  If on nasal continuous positive airway pressure, respiratory therapy assess nares and determine need for appliance change or resting period.  6/21/2024 2356 by Eleonora Virk, RN  Outcome: Progressing  6/21/2024 1231 by Cinthya Maxwell RN  Outcome: Progressing     Problem: Safety - Adult  Goal: Free from fall injury  6/21/2024 2356 by Eleonora Virk, RN  Outcome: Progressing  6/21/2024 1231 by Cinthya Maxwell RN  Outcome: Progressing     Problem: ABCDS Injury Assessment  Goal: Absence of physical injury  6/21/2024 2356 by Eleonora Virk RN  Outcome: Progressing  6/21/2024 1231 by Cinthya Maxwell RN  Outcome: Progressing

## 2024-06-22 NOTE — PROGRESS NOTES
PROGRESS NOTE  By JUDY Coburn    [facility]  Dr. Keny Mcallister M.D.,  Dr. Dane Sofia M.D.,   Dr. Gómez Gramajo D.O.,  Dr. Chris Fowler M.D.,  Dr. Alfa Jimenez D.O.,          Natali JOSE Wolff  69 y.o.  female    SUBJECTIVE:  Patient resting in bed.  Tolerating tube feed.  Complains of mild generalized abdominal discomfort.    OBJECTIVE:    /71   Pulse 64   Temp 98 °F (36.7 °C) (Axillary)   Resp 18   Ht 1.549 m (5' 0.98\")   Wt 93 kg (205 lb 1.6 oz)   SpO2 97%   BMI 38.77 kg/m²     General: NAD/obese adult  female  HEENT: Anicteric sclera/moist oral mucosa.  Nasoenteric feeding tube in place  Neck: Supple with trachea midline  Chest: Metrical excursion/nonlabored respirations  Cor: Regular/S1-S2  Abd.: Soft and obese.  Appears nontender  Extr.:  Bilateral lower extremity edema.  Decreased muscle tone and bulk throughout  Skin: Warm and dry/anicteric      DATA:    Monitor data reviewed     Lab Results   Component Value Date/Time    WBC 7.6 06/21/2024 05:40 AM    RBC 2.82 06/21/2024 05:40 AM    HGB 9.8 06/21/2024 05:40 AM    HCT 31.8 06/21/2024 05:40 AM    .8 06/21/2024 05:40 AM    MCH 34.8 06/21/2024 05:40 AM    MCHC 30.8 06/21/2024 05:40 AM    RDW 14.6 06/21/2024 05:40 AM     05/08/2024 10:40 AM    MPV 13.6 06/21/2024 05:40 AM     Lab Results   Component Value Date/Time     06/21/2024 05:40 AM    K 4.6 06/21/2024 05:40 AM    K 4.6 10/07/2022 12:45 PM    CL 98 06/21/2024 05:40 AM    CO2 34 06/21/2024 05:40 AM    BUN 20 06/21/2024 05:40 AM    CREATININE 0.8 06/21/2024 05:40 AM    CALCIUM 10.5 06/21/2024 05:40 AM    BILITOT 0.5 06/19/2024 10:56 PM    ALKPHOS 100 06/19/2024 10:56 PM    AST 19 06/19/2024 10:56 PM    ALT 19 06/19/2024 10:56 PM     Lab Results   Component Value Date/Time    LIPASE 1,211 06/19/2024 10:56 PM     No results found for: \"AMYLASE\"      ASSESSMENT/PLAN:  Patient Active Problem List   Diagnosis    Peripheral vascular disease  (HCC)    Depression    Clotting disorder (HCC)    Abnormal EKG    Kidney transplanted    Hypertension    Leg swelling    Lymphedema of both lower extremities    Acute gout involving toe of right foot    Thyroid disease    Acute renal failure with acute renal cortical necrosis superimposed on stage 4 chronic kidney disease (HCC)    Hypoxia    Acute metabolic encephalopathy    Paroxysmal atrial fibrillation (HCC)    Hypotension    LBBB (left bundle branch block)    History of DVT (deep vein thrombosis)    Chronic anticoagulation    Chronic obstructive pulmonary disease with (acute) exacerbation (HCC)    Severe obesity (BMI 35.0-39.9) with comorbidity (HCC)    Impaired fasting glucose    Stage 3b chronic kidney disease (HCC)    Chronic deep vein thrombosis (DVT) of calf muscle vein of left lower extremity (HCC)    History of herpes encephalitis    Mixed hyperlipidemia    Acquired hypothyroidism    Bipolar 1 disorder (HCC)    Acute cystitis without hematuria    Age-related osteoporosis without current pathological fracture    Chronic systolic (congestive) heart failure    Major depressive disorder, recurrent, mild    Acute pancreatitis    Other emphysema (HCC)    Hypercalcemia           Assessment / Plan:    1.  Pancreatitis  -Acute/recurrent  -Nonobstructive liver profile  -No significant hypercalcemia on this admission  -No significant hypertriglyceridemia  -No alcohol abuse  -Questionable drug-induced  mycophenolate, prednisone?  -MRI/MRCP 6/21/2024 showing unremarkable liver, unremarkable gallbladder without evidence of stones, normal caliber ducts without choledocholithiasis, interstitial/edematous pancreatitis, pancreas with evidence of possible pseudocysts versus IPMNs, no pancreatic divisum or atrophy  -Consider further evaluation with EUS as outpatient  -Continue postpyloric feelings, otherwise keep n.p.o.     2.  Anemia  -New anemia without clear evidence of overt bleed  -Likely precipitated/exacerbated by  oral anticoagulation  -Stable H&H at  -Deficient  -Pending FOBT  -Continue twice a day IV PPI  -Monitor H&H; defer transfusion to admitting  -Endoscopic evaluation in case of precipitous drop in H&H or overt bleed -see discussion below     3.  Renal failure/history of renal transplant  -Per admitting/nephrology     4.  Comorbidities   -COPD, HTN, HLD, hypothyroidism, hyperparathyroidism, CHF, A-fib, PVD, etc.  -Per admitting/pertinent consultants       MRI/MRCP showing evidence of edematous/interstitial pancreatitis with possible pseudocysts versus IPMNs.  Recommended follow-up MRI in about 3 months.  Consider EUS as outpatient.  Postpyloric feedings.  No other immediate interventions planned at this time from GI POV.  Follow-up with [facility].    Deo Cruz, APRN - CNP  6/22/2024  7:00 AM    NOTE:  This report was transcribed using voice recognition software.  Every effort was made to ensure accuracy; however, inadvertent computerized transcription errors may be present.

## 2024-06-22 NOTE — DISCHARGE SUMMARY
[facility] Hospitalist       Hospitalist Physician Discharge Summary       No follow-up provider specified.    Activity level: Slowly increase as tolerated    Diet: Diet NPO Exceptions are: Ice Chips  ADULT TUBE FEEDING; Nasogastric; Standard with Fiber; Cyclic; 50; 9:00 AM; 7:00 AM; 200; Q 6 hours    Labs: None are pending at the discharge    Condition at discharge: Stable    Dispo: Return to facility    Patient ID:  Natali Wolff  [id number]  69 y.o.  1955    Admit date: 6/19/2024    Discharge date and time:  6/22/2024  11:37 AM    Admission Diagnoses: Principal Problem:    Acute pancreatitis  Resolved Problems:    * No resolved hospital problems. *      Discharge Diagnoses: Principal Problem:    Acute pancreatitis  Resolved Problems:    * No resolved hospital problems. *      Consults:  IP CONSULT TO GI  IP CONSULT TO DIETITIAN    Procedures: None significant except if described in hospital course.    Hospital Course:    History of present illness from History and Physical:  69 y.o. female with a history of COPD, renal transplant in 2015 on cyclosporine, mycophenolate and prednisone, HTN, HLD, hypothyroid, hyperparathyroid, combined systolic and diastolic heart failure, AFib, PVD, lymphedema presents with abnormal amylase/lipase as outpatient at nursing facility.  Was recently admitted 6/1-6/3 with drug induced pancreatitis, cyclosporine was held at that time.  Patient was transferred to  on 6/3 and d/c on 6/15 to nursing facility.  During admission at  was treated with Merrem for emphysematous pyelonephritis in transplant kidney.  During admission she was intubated, treated for possible aspiration pneumonia.  Had recurrent hypoglycemia for which tube feeding was started via dobhoff.  Had altered mental status with lethargy (baseline confused, oriented to person only), workup at  included MRI brain on 6/11 and EEG which were both negative for acute pathology.  Nephrology at

## 2024-06-22 NOTE — CARE COORDINATION
SOCIAL WORK / DISCHARGE PLANNING:  Pt to return to [facility], [facility] [phone], Fax [phone], skilled today. NO PRECERT needed. Transport arranged via [facility]  2 pm. Elsa MARTEL charge aware. Margo notified nathaniel Merchant via phone of dc and time of transport. Margo called Kateryna Casco , voice message left of return to SNF today.             Electronically signed by AG Vera on 6/22/2024 at 12:25 PM

## 2024-06-25 LAB
ATRIAL RATE: 116 BPM
ATRIAL RATE: 91 BPM
P AXIS: 33 DEGREES
P AXIS: 44 DEGREES
P OFFSET: 186 MS
P OFFSET: 195 MS
P ONSET: 100 MS
P ONSET: 141 MS
PR INTERVAL: 122 MS
PR INTERVAL: 204 MS
Q ONSET: 202 MS
Q ONSET: 202 MS
QRS COUNT: 15 BEATS
QRS COUNT: 19 BEATS
QRS DURATION: 138 MS
QRS DURATION: 140 MS
QT INTERVAL: 400 MS
QT INTERVAL: 474 MS
QTC CALCULATION(BAZETT): 492 MS
QTC CALCULATION(BAZETT): 658 MS
QTC FREDERICIA: 460 MS
QTC FREDERICIA: 590 MS
R AXIS: -59 DEGREES
R AXIS: -63 DEGREES
T AXIS: 115 DEGREES
T AXIS: 136 DEGREES
T OFFSET: 402 MS
T OFFSET: 439 MS
VENTRICULAR RATE: 116 BPM
VENTRICULAR RATE: 91 BPM

## 2024-06-26 ENCOUNTER — OUTSIDE SERVICES (OUTPATIENT)
Dept: PRIMARY CARE CLINIC | Age: 69
End: 2024-06-26

## 2024-06-26 DIAGNOSIS — I48.0 PAROXYSMAL ATRIAL FIBRILLATION (HCC): ICD-10-CM

## 2024-06-26 DIAGNOSIS — I50.22 CHRONIC SYSTOLIC (CONGESTIVE) HEART FAILURE (HCC): ICD-10-CM

## 2024-06-26 DIAGNOSIS — E03.9 ACQUIRED HYPOTHYROIDISM: ICD-10-CM

## 2024-06-26 DIAGNOSIS — E78.2 MIXED HYPERLIPIDEMIA: ICD-10-CM

## 2024-06-26 DIAGNOSIS — N10 ACUTE PYELONEPHRITIS: Primary | ICD-10-CM

## 2024-06-26 DIAGNOSIS — I10 PRIMARY HYPERTENSION: ICD-10-CM

## 2024-06-26 DIAGNOSIS — K85.90 ACUTE PANCREATITIS, UNSPECIFIED COMPLICATION STATUS, UNSPECIFIED PANCREATITIS TYPE: ICD-10-CM

## 2024-06-26 DIAGNOSIS — Z94.0 S/P KIDNEY TRANSPLANT: ICD-10-CM

## 2024-06-26 DIAGNOSIS — J44.1 CHRONIC OBSTRUCTIVE PULMONARY DISEASE WITH (ACUTE) EXACERBATION (HCC): ICD-10-CM

## 2024-06-28 ASSESSMENT — VISUAL ACUITY: OU: 1

## 2024-06-28 ASSESSMENT — ENCOUNTER SYMPTOMS
SORE THROAT: 0
VOICE CHANGE: 0
EYE ITCHING: 0
TROUBLE SWALLOWING: 0
ABDOMINAL PAIN: 1
SINUS PRESSURE: 0
SINUS PAIN: 0
EYE REDNESS: 0
SHORTNESS OF BREATH: 0
WHEEZING: 0
EYE DISCHARGE: 0
RHINORRHEA: 0
COUGH: 0

## 2024-06-28 NOTE — PROGRESS NOTES
Visit Date: 6/26/24  Natali Wolff  1955  female 69 y.o.      Subjective:    CC: Patient presents with acute pyelonephritis. Patient presents for follow up of htn, hyperlipidemia, afib, chf, hypothyroidism .    HPI:  Abdominal Pain  This is a new (was admitted to hospital for pancreatitis she was treated and sent back today she has no abdominal pain) problem. The current episode started in the past 7 days. The onset quality is sudden. The problem occurs constantly. The problem has been gradually worsening. The pain is at a severity of 3/10. The pain is mild. Pertinent negatives include no myalgias.   Hypertension  This is a chronic problem. The current episode started more than 1 year ago. The problem is unchanged. The problem is controlled. Pertinent negatives include no shortness of breath. There are no associated agents to hypertension. Risk factors for coronary artery disease include dyslipidemia and obesity. Treatments tried: taking medicatinos, no medication side effects. The current treatment provides mild improvement. There are no compliance problems.  Identifiable causes of hypertension include a thyroid problem.   Hyperlipidemia  The current episode started more than 1 year ago. The problem is controlled. Recent lipid tests were reviewed and are variable. There are no known factors aggravating her hyperlipidemia. Pertinent negatives include no myalgias or shortness of breath. Treatments tried: taking medications, no medication side effects. The current treatment provides mild improvement of lipids. There are no compliance problems.  Risk factors for coronary artery disease include dyslipidemia, hypertension and obesity.   Atrial Fibrillation  Presents for follow-up visit. Symptoms are negative for shortness of breath. The symptoms have been stable. Past medical history includes CHF and hyperlipidemia. Compliance problems: taking medications, no medication side effects.   Congestive Heart

## 2024-06-28 NOTE — PROGRESS NOTES
Physician Progress Note      PATIENT:               JOHN DIAL  CSN #:                  [id number]  :                       1955  ADMIT DATE:       2024 9:57 PM  DISCH DATE:        2024 3:29 PM  RESPONDING  PROVIDER #:        Javi Sr MD          QUERY TEXT:    Patient admitted with Pancreatitis, noted to have CKD documented with history   of Kidney transplant . If possible, please document in progress notes and   discharge summary if you are evaluating and/or treating any of the following:    The medical record reflects the following:  Risk Factors: Hx kidney transplant, HTN, morbid obesity, Prediabetes  Clinical Indicators: Bun/Creat/Gfr: 24: 24/0.9/75. Per EMR 24:   40/2., 23: 15/.  Treatment: IVF 500cc bolus, Cellcept, Prednisone as at home.    Thank you, Prachi naidu RN [facility]  [phone]  Options provided:  -- CKD Stage 1 GFR>90  -- CKD Stage 2 GFR 60-90  -- CKD Stage 3a GFR 45-59  -- CKD Stage 3b GFR 30-44  -- CKD Stage 4 GFR 15-29  -- CKD Stage 5 GFR<15  -- ESRD  -- Other - I will add my own diagnosis  -- Disagree - Not applicable / Not valid  -- Disagree - Clinically unable to determine / Unknown  -- Refer to Clinical Documentation Reviewer    PROVIDER RESPONSE TEXT:    This patient has CKD Stage 3a.    Query created by: Prachi Naidu on 2024 3:10 PM      Electronically signed by:  Javi Sr MD 2024 4:01 PM

## 2024-07-02 ENCOUNTER — TELEPHONE (OUTPATIENT)
Dept: OTHER | Age: 69
End: 2024-07-02

## 2024-07-02 ENCOUNTER — OUTSIDE SERVICES (OUTPATIENT)
Dept: PRIMARY CARE CLINIC | Age: 69
End: 2024-07-02

## 2024-07-02 DIAGNOSIS — I50.22 CHRONIC SYSTOLIC (CONGESTIVE) HEART FAILURE (HCC): ICD-10-CM

## 2024-07-02 DIAGNOSIS — I10 PRIMARY HYPERTENSION: ICD-10-CM

## 2024-07-02 DIAGNOSIS — Z94.0 S/P KIDNEY TRANSPLANT: ICD-10-CM

## 2024-07-02 DIAGNOSIS — J44.1 CHRONIC OBSTRUCTIVE PULMONARY DISEASE WITH (ACUTE) EXACERBATION (HCC): ICD-10-CM

## 2024-07-02 DIAGNOSIS — K85.90 ACUTE PANCREATITIS, UNSPECIFIED COMPLICATION STATUS, UNSPECIFIED PANCREATITIS TYPE: Primary | ICD-10-CM

## 2024-07-02 DIAGNOSIS — I48.0 PAROXYSMAL ATRIAL FIBRILLATION (HCC): ICD-10-CM

## 2024-07-02 DIAGNOSIS — E03.9 ACQUIRED HYPOTHYROIDISM: ICD-10-CM

## 2024-07-02 DIAGNOSIS — E78.2 MIXED HYPERLIPIDEMIA: ICD-10-CM

## 2024-07-02 NOTE — TELEPHONE ENCOUNTER
[facility] today that she did not show up for.. Called patient to reschedule and she stated that she is hospitalized. Appointment rescheduled.

## 2024-07-05 ASSESSMENT — ENCOUNTER SYMPTOMS
SINUS PRESSURE: 0
RHINORRHEA: 0
ABDOMINAL PAIN: 1
WHEEZING: 0
EYE DISCHARGE: 0
SINUS PAIN: 0
SHORTNESS OF BREATH: 0
COUGH: 0
SORE THROAT: 0
EYE REDNESS: 0
VOICE CHANGE: 0
TROUBLE SWALLOWING: 0
EYE ITCHING: 0

## 2024-07-05 ASSESSMENT — VISUAL ACUITY: OU: 1

## 2024-07-05 NOTE — PROGRESS NOTES
PARATHYROIDECTOMY  2006    left parathyroid  implant and parathyroidectomy    TRANSESOPHAGEAL ECHOCARDIOGRAM N/A 02/28/2022    TRANSESOPHAGEAL ECHOCARDIOGRAM WITH BUBBLE STUDY performed by Rafa Renteria MD at [facility] ENDOSCOPY        FH: reviewed and updated       Problem Relation Age of Onset    Diabetes Mother     Diabetes Father     Dementia Father     Breast Cancer Sister     Breast Cancer Maternal Grandmother         SH: reviewed and updated    reports that she quit smoking about 8 years ago. Her smoking use included cigarettes. She started smoking about 28 years ago. She has a 20.0 pack-year smoking history. She has never used smokeless tobacco. She reports that she does not drink alcohol and does not use drugs.     Objective:  Vital signs for Natali was reviewed in the nursing home record.     Physical Exam:  Physical Exam  Vitals reviewed.   Constitutional:       General: She is not in acute distress.     Appearance: She is obese. She is not ill-appearing or toxic-appearing.      Comments: Appears appropriate for age   HENT:      Head: Normocephalic and atraumatic.      Right Ear: Tympanic membrane and external ear normal.      Left Ear: Tympanic membrane and external ear normal.      Ears:      Comments: Middle ear well aerated.     Nose: Nose normal.      Mouth/Throat:      Mouth: Mucous membranes are moist.      Dentition: Normal dentition. No gingival swelling or dental caries.      Pharynx: Oropharynx is clear. Uvula midline.      Comments: Gums appear healthy.   No thrush no mucositis  Eyes:      General: Vision grossly intact.      Extraocular Movements: Extraocular movements intact.      Conjunctiva/sclera: Conjunctivae normal.      Pupils: Pupils are equal, round, and reactive to light.      Comments: Fundoscopic exam is benign  Retinal vessels: Normal   Neck:      Vascular: No carotid bruit.      Comments: Thyroid is normal in size.  Carotids 2+ and equal bilaterally  Cardiovascular:      Rate

## 2024-07-18 ENCOUNTER — OUTSIDE SERVICES (OUTPATIENT)
Dept: PRIMARY CARE CLINIC | Age: 69
End: 2024-07-18

## 2024-07-18 DIAGNOSIS — I48.0 PAROXYSMAL ATRIAL FIBRILLATION (HCC): ICD-10-CM

## 2024-07-18 DIAGNOSIS — K85.90 ACUTE PANCREATITIS, UNSPECIFIED COMPLICATION STATUS, UNSPECIFIED PANCREATITIS TYPE: Primary | ICD-10-CM

## 2024-07-18 DIAGNOSIS — E03.9 ACQUIRED HYPOTHYROIDISM: ICD-10-CM

## 2024-07-18 DIAGNOSIS — J44.1 CHRONIC OBSTRUCTIVE PULMONARY DISEASE WITH (ACUTE) EXACERBATION (HCC): ICD-10-CM

## 2024-07-18 DIAGNOSIS — I50.22 CHRONIC SYSTOLIC (CONGESTIVE) HEART FAILURE (HCC): ICD-10-CM

## 2024-07-18 DIAGNOSIS — I10 PRIMARY HYPERTENSION: ICD-10-CM

## 2024-07-18 DIAGNOSIS — Z94.0 S/P KIDNEY TRANSPLANT: ICD-10-CM

## 2024-07-18 DIAGNOSIS — E78.2 MIXED HYPERLIPIDEMIA: ICD-10-CM

## 2024-07-19 ASSESSMENT — ENCOUNTER SYMPTOMS
EYE REDNESS: 0
TROUBLE SWALLOWING: 0
EYE ITCHING: 0
VOICE CHANGE: 0
SINUS PAIN: 0
RHINORRHEA: 0
SINUS PRESSURE: 0
SORE THROAT: 0
WHEEZING: 0
ABDOMINAL PAIN: 1
EYE DISCHARGE: 0
COUGH: 0
SHORTNESS OF BREATH: 0

## 2024-07-19 ASSESSMENT — VISUAL ACUITY: OU: 1

## 2024-07-19 NOTE — PROGRESS NOTES
hyperlipidemia  7. Chronic obstructive pulmonary disease with (acute) exacerbation (HCC)  8. Acquired hypothyroidism     Chart reviewed   Medications reviewed   Working with therapy getting stronger   Pancreatitis amylase and lipase are within normal limits she will discharge home and will follow up with her pcp upon discharge   Hypothyroidism on levoxyl continue medications  Afib heart rate stable   Htn blood pressure under good control   Continue current treatment       Anne FLORES MA  am scribing for Srini Clarke MD, personally performed the services described in this documentation as scribed by Anne Wilson and it is both accurate and complete

## 2024-07-25 ENCOUNTER — HOSPITAL ENCOUNTER (OUTPATIENT)
Age: 69
Discharge: HOME | End: 2024-07-25
Payer: MEDICARE

## 2024-07-25 ENCOUNTER — HOSPITAL ENCOUNTER (OUTPATIENT)
Dept: OTHER | Age: 69
Setting detail: THERAPIES SERIES
Discharge: HOME | End: 2024-07-25
Payer: MEDICARE

## 2024-07-25 VITALS
SYSTOLIC BLOOD PRESSURE: 134 MMHG | HEART RATE: 87 BPM | DIASTOLIC BLOOD PRESSURE: 77 MMHG | OXYGEN SATURATION: 95 % | BODY MASS INDEX: 35.24 KG/M2 | RESPIRATION RATE: 18 BRPM | WEIGHT: 186.4 LBS

## 2024-07-25 LAB
25(OH)D3 SERPL-MCNC: 42.3 NG/ML (ref 30–100)
ALBUMIN SERPL-MCNC: 4.1 G/DL (ref 3.5–5.2)
ALP SERPL-CCNC: 73 U/L (ref 35–104)
ALT SERPL-CCNC: 6 U/L (ref 0–32)
ANION GAP SERPL CALCULATED.3IONS-SCNC: 14 MMOL/L (ref 7–16)
AST SERPL-CCNC: 10 U/L (ref 0–31)
BASOPHILS # BLD: 0.02 K/UL (ref 0–0.2)
BASOPHILS NFR BLD: 0 % (ref 0–2)
BILIRUB DIRECT SERPL-MCNC: <0.2 MG/DL (ref 0–0.3)
BILIRUB INDIRECT SERPL-MCNC: NORMAL MG/DL (ref 0–1)
BILIRUB SERPL-MCNC: 0.4 MG/DL (ref 0–1.2)
BNP SERPL-MCNC: 2580 PG/ML (ref 0–450)
BUN SERPL-MCNC: 12 MG/DL (ref 6–23)
CALCIUM SERPL-MCNC: 10.2 MG/DL (ref 8.6–10.2)
CHLORIDE SERPL-SCNC: 105 MMOL/L (ref 98–107)
CO2 SERPL-SCNC: 17 MMOL/L (ref 22–29)
CREAT SERPL-MCNC: 0.9 MG/DL (ref 0.5–1)
CREAT UR-MCNC: 137.1 MG/DL (ref 29–226)
EOSINOPHIL # BLD: 0.02 K/UL (ref 0.05–0.5)
EOSINOPHILS RELATIVE PERCENT: 0 % (ref 0–6)
ERYTHROCYTE [DISTWIDTH] IN BLOOD BY AUTOMATED COUNT: 14.6 % (ref 11.5–15)
GFR, ESTIMATED: 66 ML/MIN/1.73M2
GLUCOSE SERPL-MCNC: 134 MG/DL (ref 74–99)
HCT VFR BLD AUTO: 36.3 % (ref 34–48)
HGB BLD-MCNC: 11.3 G/DL (ref 11.5–15.5)
IMM GRANULOCYTES # BLD AUTO: 0.04 K/UL (ref 0–0.58)
IMM GRANULOCYTES NFR BLD: 1 % (ref 0–5)
LYMPHOCYTES NFR BLD: 1 K/UL (ref 1.5–4)
LYMPHOCYTES RELATIVE PERCENT: 12 % (ref 20–42)
MAGNESIUM SERPL-MCNC: 1.4 MG/DL (ref 1.6–2.6)
MCH RBC QN AUTO: 33.7 PG (ref 26–35)
MCHC RBC AUTO-ENTMCNC: 31.1 G/DL (ref 32–34.5)
MCV RBC AUTO: 108.4 FL (ref 80–99.9)
MICROALBUMIN UR-MCNC: 94 MG/L (ref 0–19)
MICROALBUMIN/CREAT UR-RTO: 69 MCG/MG CREAT (ref 0–30)
MONOCYTES NFR BLD: 0.4 K/UL (ref 0.1–0.95)
MONOCYTES NFR BLD: 5 % (ref 2–12)
NEUTROPHILS NFR BLD: 82 % (ref 43–80)
NEUTS SEG NFR BLD: 6.93 K/UL (ref 1.8–7.3)
PLATELET # BLD AUTO: 218 K/UL (ref 130–450)
PMV BLD AUTO: 11.9 FL (ref 7–12)
POTASSIUM SERPL-SCNC: 3.9 MMOL/L (ref 3.5–5)
PROT SERPL-MCNC: 6.8 G/DL (ref 6.4–8.3)
PTH-INTACT SERPL-MCNC: 71.2 PG/ML (ref 15–65)
RBC # BLD AUTO: 3.35 M/UL (ref 3.5–5.5)
SODIUM SERPL-SCNC: 136 MMOL/L (ref 132–146)
WBC OTHER # BLD: 8.4 K/UL (ref 4.5–11.5)

## 2024-07-25 PROCEDURE — 83735 ASSAY OF MAGNESIUM: CPT

## 2024-07-25 PROCEDURE — 83970 ASSAY OF PARATHORMONE: CPT

## 2024-07-25 PROCEDURE — 80158 DRUG ASSAY CYCLOSPORINE: CPT

## 2024-07-25 PROCEDURE — 83880 ASSAY OF NATRIURETIC PEPTIDE: CPT

## 2024-07-25 PROCEDURE — 82306 VITAMIN D 25 HYDROXY: CPT

## 2024-07-25 PROCEDURE — 85025 COMPLETE CBC W/AUTO DIFF WBC: CPT

## 2024-07-25 PROCEDURE — 36415 COLL VENOUS BLD VENIPUNCTURE: CPT

## 2024-07-25 PROCEDURE — 82570 ASSAY OF URINE CREATININE: CPT

## 2024-07-25 PROCEDURE — 99214 OFFICE O/P EST MOD 30 MIN: CPT

## 2024-07-25 PROCEDURE — 82043 UR ALBUMIN QUANTITATIVE: CPT

## 2024-07-25 PROCEDURE — 80053 COMPREHEN METABOLIC PANEL: CPT

## 2024-07-25 PROCEDURE — 82248 BILIRUBIN DIRECT: CPT

## 2024-07-25 RX ORDER — CLOZAPINE 25 MG/1
25 TABLET ORAL DAILY
COMMUNITY

## 2024-07-25 RX ORDER — DESVENLAFAXINE 25 MG/1
25 TABLET, EXTENDED RELEASE ORAL DAILY
COMMUNITY
Start: 2024-07-23

## 2024-07-25 NOTE — PROGRESS NOTES
Congestive Heart Failure Clinic   [facility]     Referring Provider: Dr. Renteria  Primary Care Physician: Brennon Malave DO   Cardiologist: Dr. Nash  Nephrologist: Dr. Latham    HISTORY OF PRESENT ILLNESS:     Natali Wolff is a 69 y.o. (1955) female with a history of HFpEF (EF> 50%), most recent EF:  Lab Results   Component Value Date    LVEF 55 07/24/2023    LVEFMODE Nuclear Medicine Scan 06/16/2015         She presents to the CHF clinic for ongoing evaluation and monitoring of heart failure.    In the CHF clinic today she denies any adverse symptoms except:  [] Dizziness or lightheadedness   [] Syncope or near syncope  [] Recent Fall  [] Shortness of breath at rest   [] Dyspnea with exertion  [] Decline in functional capacity (unable to perform activities they had previously been able to do)  [] Fatigue   [] Orthopnea  [] PND  [] Nocturnal cough  [] Hemoptysis  [] Chest pain, pressure, or discomfort  [] Palpitations   [] Abdominal distention  [] Abdominal bloating  [] Early satiety  [] Blood in stool   [x] Diarrhea  [] Constipation  [] Nausea/Vomiting  [] Decreased urinary response to oral diuretic   [] Scrotal swelling   [x] Lower extremity edema (RLE trace/ LLE trace)  [] Used PRN doses of oral diuretic   [] Weight gain    Wt Readings from Last 3 Encounters:   07/25/24 84.6 kg (186 lb 6.4 oz)   06/20/24 93 kg (205 lb 1.6 oz)   06/01/24 97 kg (213 lb 13.5 oz)       SOCIAL HISTORY:  [x] Denies tobacco, alcohol or illicit drug abuse  [] Tobacco use:  [] ETOH use:  [] Illicit drug use:        MEDICATIONS:    Allergies   Allergen Reactions    Cipro [Ciprofloxacin Hcl]     Macrodantin [Nitrofurantoin Macrocrystal] Nausea Only    Sulfa Antibiotics Nausea Only     Prior to Visit Medications    Medication Sig Taking? Authorizing Provider   desvenlafaxine succinate (PRISTIQ) 25 MG TB24 extended release tablet Take 1 tablet by mouth daily Yes Provider,

## 2024-07-29 ENCOUNTER — TELEPHONE (OUTPATIENT)
Dept: TRANSPLANT | Facility: CLINIC | Age: 69
End: 2024-07-29
Payer: MEDICARE

## 2024-07-29 ENCOUNTER — DOCUMENTATION (OUTPATIENT)
Dept: TRANSPLANT | Facility: HOSPITAL | Age: 69
End: 2024-07-29
Payer: MEDICARE

## 2024-07-29 LAB — CYCLOSPORINE BLD-MCNC: <30 UG/L (ref 50–300)

## 2024-07-29 NOTE — PROGRESS NOTES
Labs reviewed with Dr. White  Cyclosporine lvl 30 (129, 109, 109)  PLAN: recheck lvl.  Called to investigate if patient missed any cyclo doses.  No answer, message left for her to call office back to discuss.

## 2024-07-31 ENCOUNTER — TELEPHONE (OUTPATIENT)
Dept: TRANSPLANT | Facility: HOSPITAL | Age: 69
End: 2024-07-31
Payer: MEDICARE

## 2024-07-31 ENCOUNTER — HOSPITAL ENCOUNTER (OUTPATIENT)
Age: 69
Discharge: HOME | End: 2024-07-31
Payer: MEDICARE

## 2024-07-31 PROCEDURE — 80158 DRUG ASSAY CYCLOSPORINE: CPT

## 2024-07-31 PROCEDURE — 36415 COLL VENOUS BLD VENIPUNCTURE: CPT

## 2024-07-31 NOTE — TELEPHONE ENCOUNTER
Received call from outside lab, wasn't sure which labs the patient needed drawn, per primary coordinator patient needed a new CSA level drawn.  They will draw a new CSA.

## 2024-08-02 ENCOUNTER — TELEPHONE (OUTPATIENT)
Dept: ADMINISTRATIVE | Age: 69
End: 2024-08-02

## 2024-08-02 LAB — CYCLOSPORINE BLD-MCNC: 62 UG/L (ref 50–300)

## 2024-08-06 ENCOUNTER — HOSPITAL ENCOUNTER (OUTPATIENT)
Age: 69
Discharge: HOME | End: 2024-08-06
Payer: MEDICARE

## 2024-08-06 DIAGNOSIS — N39.0 RECURRENT UTI (URINARY TRACT INFECTION): ICD-10-CM

## 2024-08-06 LAB
BACTERIA URNS QL MICRO: ABNORMAL
BILIRUB UR QL STRIP: NEGATIVE
CLARITY UR: CLEAR
COLOR UR: YELLOW
GLUCOSE UR STRIP-MCNC: NEGATIVE MG/DL
HGB UR QL STRIP.AUTO: ABNORMAL
KETONES UR STRIP-MCNC: NEGATIVE MG/DL
LEUKOCYTE ESTERASE UR QL STRIP: ABNORMAL
NITRITE UR QL STRIP: NEGATIVE
PH UR STRIP: 5.5 [PH] (ref 5–9)
PROT UR STRIP-MCNC: NEGATIVE MG/DL
RBC #/AREA URNS HPF: ABNORMAL /HPF
SP GR UR STRIP: 1.02 (ref 1–1.03)
UROBILINOGEN UR STRIP-ACNC: 0.2 EU/DL (ref 0–1)
WBC #/AREA URNS HPF: ABNORMAL /HPF

## 2024-08-06 PROCEDURE — 81001 URINALYSIS AUTO W/SCOPE: CPT

## 2024-08-06 PROCEDURE — 87077 CULTURE AEROBIC IDENTIFY: CPT

## 2024-08-06 PROCEDURE — 87086 URINE CULTURE/COLONY COUNT: CPT

## 2024-08-07 ENCOUNTER — TELEPHONE (OUTPATIENT)
Dept: TRANSPLANT | Facility: HOSPITAL | Age: 69
End: 2024-08-07

## 2024-08-08 ENCOUNTER — TELEPHONE (OUTPATIENT)
Dept: OTHER | Age: 69
End: 2024-08-08

## 2024-08-08 ENCOUNTER — OFFICE VISIT (OUTPATIENT)
Dept: INTERNAL MEDICINE CLINIC | Age: 69
End: 2024-08-08
Payer: MEDICARE

## 2024-08-08 VITALS
DIASTOLIC BLOOD PRESSURE: 84 MMHG | WEIGHT: 189 LBS | TEMPERATURE: 97.6 F | BODY MASS INDEX: 35.68 KG/M2 | HEART RATE: 110 BPM | SYSTOLIC BLOOD PRESSURE: 138 MMHG | HEIGHT: 61 IN | OXYGEN SATURATION: 97 %

## 2024-08-08 DIAGNOSIS — Z09 HOSPITAL DISCHARGE FOLLOW-UP: Primary | ICD-10-CM

## 2024-08-08 DIAGNOSIS — J44.1 CHRONIC OBSTRUCTIVE PULMONARY DISEASE WITH (ACUTE) EXACERBATION (HCC): ICD-10-CM

## 2024-08-08 DIAGNOSIS — I50.22 CHRONIC SYSTOLIC (CONGESTIVE) HEART FAILURE (HCC): ICD-10-CM

## 2024-08-08 DIAGNOSIS — F31.9 BIPOLAR 1 DISORDER (HCC): ICD-10-CM

## 2024-08-08 DIAGNOSIS — Z94.0 KIDNEY TRANSPLANTED: ICD-10-CM

## 2024-08-08 DIAGNOSIS — E78.2 MIXED HYPERLIPIDEMIA: ICD-10-CM

## 2024-08-08 DIAGNOSIS — I10 PRIMARY HYPERTENSION: ICD-10-CM

## 2024-08-08 DIAGNOSIS — E66.01 SEVERE OBESITY (BMI 35.0-39.9) WITH COMORBIDITY (HCC): ICD-10-CM

## 2024-08-08 DIAGNOSIS — E03.9 ACQUIRED HYPOTHYROIDISM: ICD-10-CM

## 2024-08-08 PROCEDURE — 3079F DIAST BP 80-89 MM HG: CPT | Performed by: NEUROMUSCULOSKELETAL MEDICINE & OMM

## 2024-08-08 PROCEDURE — G8399 PT W/DXA RESULTS DOCUMENT: HCPCS | Performed by: NEUROMUSCULOSKELETAL MEDICINE & OMM

## 2024-08-08 PROCEDURE — 3023F SPIROM DOC REV: CPT | Performed by: NEUROMUSCULOSKELETAL MEDICINE & OMM

## 2024-08-08 PROCEDURE — 1090F PRES/ABSN URINE INCON ASSESS: CPT | Performed by: NEUROMUSCULOSKELETAL MEDICINE & OMM

## 2024-08-08 PROCEDURE — 3017F COLORECTAL CA SCREEN DOC REV: CPT | Performed by: NEUROMUSCULOSKELETAL MEDICINE & OMM

## 2024-08-08 PROCEDURE — G8417 CALC BMI ABV UP PARAM F/U: HCPCS | Performed by: NEUROMUSCULOSKELETAL MEDICINE & OMM

## 2024-08-08 PROCEDURE — G8427 DOCREV CUR MEDS BY ELIG CLIN: HCPCS | Performed by: NEUROMUSCULOSKELETAL MEDICINE & OMM

## 2024-08-08 PROCEDURE — 1123F ACP DISCUSS/DSCN MKR DOCD: CPT | Performed by: NEUROMUSCULOSKELETAL MEDICINE & OMM

## 2024-08-08 PROCEDURE — 99215 OFFICE O/P EST HI 40 MIN: CPT | Performed by: NEUROMUSCULOSKELETAL MEDICINE & OMM

## 2024-08-08 PROCEDURE — 1111F DSCHRG MED/CURRENT MED MERGE: CPT | Performed by: NEUROMUSCULOSKELETAL MEDICINE & OMM

## 2024-08-08 PROCEDURE — 1036F TOBACCO NON-USER: CPT | Performed by: NEUROMUSCULOSKELETAL MEDICINE & OMM

## 2024-08-08 PROCEDURE — 3075F SYST BP GE 130 - 139MM HG: CPT | Performed by: NEUROMUSCULOSKELETAL MEDICINE & OMM

## 2024-08-08 NOTE — TELEPHONE ENCOUNTER
3:40 PM 8/8/2024 Patient called in BLE edema.  PCP told her to call us to be evaluated. She cannot come in tomorrow but can come Monday. Scheduled on Monday for a STAT visit.  It looks like she was in the hospital in June and Bumex 1 mg QD, was discontinued due to low bps. Breathing at baseline.  No other symptoms.  Laine MARTEL, abril Howell APRN-CNP.         Future Appointments   Date Time Provider Department Center   8/12/2024 10:30 AM [facility] CHF ROOM 2 [facility]   8/21/2024  9:30 AM Pushpa Wright MD [facility]   8/26/2024 12:45 PM [facility] CHF ROOM 1 [facility]   9/17/2024  9:15 AM [facility]   11/25/2024  3:30 PM Silvestre Nash MD [facility]

## 2024-08-08 NOTE — PROGRESS NOTES
Post-Discharge Transitional Care  Follow Up      Natali Wolff   YOB: 1955    Date of Office Visit:  8/8/2024  Date of Hospital Admission: 6/19/24  Date of Hospital Discharge: 6/22/24  Risk of hospital readmission (high >=14%. Medium >=10%) :Readmission Risk Score: 21.9      Care management risk score Rising risk (score 2-5) and Complex Care (Scores >=6): No Risk Score On File     Non face to face  following discharge, date last encounter closed (first attempt may have been earlier): *No documented post hospital discharge outreach found in the last 14 days    Call initiated 2 business days of discharge: *No response recorded in the last 14 days    ASSESSMENT/PLAN:   Hospital discharge follow-up  -     OK DISCHARGE MEDS RECONCILED W/ CURRENT OUTPATIENT MED LIST  Primary hypertension  -     metoprolol tartrate (LOPRESSOR) 25 MG tablet; 0.5mg bid, Disp-90 tablet, R-1Normal  Bipolar 1 disorder (HCC)  Chronic systolic (congestive) heart failure (HCC)  Comments:  chronic condition well controlled, going to CHF clinic at [facility] every 28 days.  Chronic obstructive pulmonary disease with (acute) exacerbation (HCC)  Comments:  chronic condition that is well controlled with no recent exacerbations. continue present care.  Mixed hyperlipidemia  Acquired hypothyroidism  Kidney transplanted  Severe obesity (BMI 35.0-39.9) with comorbidity (HCC)      Medical Decision Making: high complexity  Return in 1 month (on 9/8/2024).    On this date 8/8/2024 I have spent 45 minutes reviewing previous notes, test results and face to face with the patient discussing the diagnosis and importance of compliance with the treatment plan as well as documenting on the day of the visit.       Subjective:   HPI:  Follow up of Hospital problems/diagnosis(es): Patient had pancreatitis and UTI while at [facility]    Inpatient course: Discharge summary reviewed- see chart.    Interval history/Current status: 69 y.o. female with

## 2024-08-09 ENCOUNTER — TELEPHONE (OUTPATIENT)
Dept: OTHER | Age: 69
End: 2024-08-09

## 2024-08-09 NOTE — TELEPHONE ENCOUNTER
11:50 AM 8/9/2024 Called patient re: new medication changes. I have reviewed the provider's instructions with the patient, answering all questions to her satisfaction.

## 2024-08-10 LAB
MICROORGANISM SPEC CULT: ABNORMAL
MICROORGANISM SPEC CULT: ABNORMAL
SERVICE CMNT-IMP: ABNORMAL
SPECIMEN DESCRIPTION: ABNORMAL

## 2024-08-11 DIAGNOSIS — B96.89 UTI DUE TO KLEBSIELLA SPECIES: Primary | ICD-10-CM

## 2024-08-11 DIAGNOSIS — N39.0 UTI DUE TO KLEBSIELLA SPECIES: Primary | ICD-10-CM

## 2024-08-11 RX ORDER — CEFDINIR 300 MG/1
300 CAPSULE ORAL 2 TIMES DAILY
Qty: 28 CAPSULE | Refills: 0 | Status: SHIPPED | OUTPATIENT
Start: 2024-08-11 | End: 2024-08-25

## 2024-08-12 ENCOUNTER — TELEPHONE (OUTPATIENT)
Dept: OTHER | Age: 69
End: 2024-08-12

## 2024-08-12 ENCOUNTER — HOSPITAL ENCOUNTER (OUTPATIENT)
Dept: OTHER | Age: 69
Setting detail: THERAPIES SERIES
Discharge: HOME | End: 2024-08-12
Payer: MEDICARE

## 2024-08-12 VITALS
SYSTOLIC BLOOD PRESSURE: 100 MMHG | HEART RATE: 80 BPM | OXYGEN SATURATION: 97 % | DIASTOLIC BLOOD PRESSURE: 62 MMHG | RESPIRATION RATE: 18 BRPM | BODY MASS INDEX: 35.55 KG/M2 | WEIGHT: 188 LBS

## 2024-08-12 LAB
ANION GAP SERPL CALCULATED.3IONS-SCNC: 13 MMOL/L (ref 7–16)
BNP SERPL-MCNC: 1518 PG/ML (ref 0–450)
BUN SERPL-MCNC: 23 MG/DL (ref 6–23)
CALCIUM SERPL-MCNC: 9.7 MG/DL (ref 8.6–10.2)
CHLORIDE SERPL-SCNC: 96 MMOL/L (ref 98–107)
CO2 SERPL-SCNC: 26 MMOL/L (ref 22–29)
CREAT SERPL-MCNC: 1.7 MG/DL (ref 0.5–1)
GFR, ESTIMATED: 34 ML/MIN/1.73M2
GLUCOSE SERPL-MCNC: 135 MG/DL (ref 74–99)
POTASSIUM SERPL-SCNC: 4 MMOL/L (ref 3.5–5)
SODIUM SERPL-SCNC: 135 MMOL/L (ref 132–146)

## 2024-08-12 PROCEDURE — 36415 COLL VENOUS BLD VENIPUNCTURE: CPT

## 2024-08-12 PROCEDURE — 99214 OFFICE O/P EST MOD 30 MIN: CPT

## 2024-08-12 PROCEDURE — 83880 ASSAY OF NATRIURETIC PEPTIDE: CPT

## 2024-08-12 PROCEDURE — 80048 BASIC METABOLIC PNL TOTAL CA: CPT

## 2024-08-12 RX ORDER — BUMETANIDE 1 MG/1
1 TABLET ORAL
Qty: 38 TABLET | Refills: 3 | Status: SHIPPED | OUTPATIENT
Start: 2024-08-12

## 2024-08-12 RX ORDER — BUMETANIDE 1 MG/1
1 TABLET ORAL
Qty: 30 TABLET | Refills: 3 | Status: SHIPPED | OUTPATIENT
Start: 2024-08-12 | End: 2024-08-12

## 2024-08-12 RX ORDER — CYCLOSPORINE 100 MG/1
CAPSULE, GELATIN COATED ORAL 2 TIMES DAILY
COMMUNITY

## 2024-08-12 ASSESSMENT — PATIENT HEALTH QUESTIONNAIRE - PHQ9
SUM OF ALL RESPONSES TO PHQ QUESTIONS 1-9: 0
SUM OF ALL RESPONSES TO PHQ9 QUESTIONS 1 & 2: 0
SUM OF ALL RESPONSES TO PHQ QUESTIONS 1-9: 0
2. FEELING DOWN, DEPRESSED OR HOPELESS: NOT AT ALL
1. LITTLE INTEREST OR PLEASURE IN DOING THINGS: NOT AT ALL

## 2024-08-12 NOTE — RESULT ENCOUNTER NOTE
Labs and CHF clinic note reviewed  Spoke with Tri MARTEL in CHF clinic     Vitals: 100/62, 80    Was taken off bumex during last hospitalization   Called into clinic last week complaining of hypervolemic, was offer CHF clinic visit however unable to come therefore was instructed to take bumex 1 mg BID Saturday and Sunday over the weekend and came into CHF clinic today (Monday).     Improved symptoms with jump in SCR    Stay hydrated  Resume bumex 1 mg daily three times weekly  Follow up in CHF clinic in 2 weeks - sooner if needed    Thank you

## 2024-08-12 NOTE — TELEPHONE ENCOUNTER
3:34 PM 8/12/2024 Called patient re: new medication changes. I have attempted to contact this patient by phone with the following results: left detailed message to return my call on answering machine and provided a contact number .       3:41 PM 8/12/2024 Patient called back to review new medication changes. I have reviewed the provider's instructions with the patient, answering all questions to her satisfaction.      Future Appointments   Date Time Provider Department Center   8/21/2024  9:30 AM Pushpa Wright MD [facility]   8/26/2024 12:45 PM [facility] CHF ROOM 1 [facility]   9/17/2024  9:15 AM Brennon Malave DO [facility] DE[facility]   11/25/2024  3:30 PM Silvestre Nash MD [facility]

## 2024-08-12 NOTE — TELEPHONE ENCOUNTER
----- Message from KATINA Hardy CNP sent at 8/12/2024  3:34 PM EDT -----  Labs and CHF clinic note reviewed  Spoke with Tri MARTEL in CHF clinic     Vitals: 100/62, 80    Was taken off bumex during last hospitalization   Called into clinic last week complaining of hypervolemic, was offer CHF clinic visit however unable to come therefore was instructed to take bumex 1 mg BID Saturday and Sunday over the weekend and came into CHF clinic today (Monday).     Improved symptoms with jump in SCR    Stay hydrated  Resume bumex 1 mg daily three times weekly  Follow up in CHF clinic in 2 weeks - sooner if needed    Thank you

## 2024-08-12 NOTE — PROGRESS NOTES
error  
units)   Date Value   05/18/2022 1.1         Wt Readings from Last 3 Encounters:   08/12/24 85.3 kg (188 lb)   08/08/24 85.7 kg (189 lb)   07/25/24 84.6 kg (186 lb 6.4 oz)       ASSESSMENT/PLAN:    [] Euvolemic          [x] Mildly Hypervolemic, with increase from baseline:  [] Shortness of breath/PUGA  [] JVD  [] HJR  [] Abnormal lung assessment:   [] Orthopnea  [] PND  [] Decreased urinary response to oral diuretic   [] Scrotal swelling   [x] Lower extremity edema  [] Compression stockings provided  [] Decline in functional capacity (unable to perform activities they had previously been able to do)  [] Weight gain     [] IV diuretics given No  [] Provider notified of recurrent IV diuretic use    Additional Notes:      On 8/8, pt called into the CHF clinic d/t increased edema. She states her PCP recommended calling the CHF clinic for an appointment. Offered an appointment on Friday however declined. Notified APRN-CNP and ordered Bumex 1 mg BID for two days and follow up on Monday. Since 8/9, pt has decreased her weight by 4 lbs. Pt states she has noticed a decreased in her edema. Pt states on Friday, Dr. Malave re-started metoprolol tarate 12.5 mg BID d/t HR was 110 bpm in the office. Pt HR is 80 bpm NSR and /60 mmHg.     Pt states she received a call from the lab about her urine on Sunday. + UTI and placed on Omcief for 14 days. Pt has taken one dose of antibiotics.  Will notified SCr 1.7 at today's visit.     Follow up two weeks.     [x]Lab work obtained    [x] Patient/Family Educated On:  [x] HF zones (Green, Yellow, Red) and aware of when to take action   [x] Daily weights  [] Scale provided   [x] Low sodium diet (2000 mg)  Barriers to compliance  [] Refuses to monitor diet  [] Socioeconomic difficulties  [] Unable to cook for self (use of frozen meals, can goods, etc)  [] CHF CHW consulted  [x] Low sodium meal deliver- using [facility] meal delivery  [] Dietician consulted   [] Low sodium recipes provided  []

## 2024-08-21 ENCOUNTER — HOSPITAL ENCOUNTER (OUTPATIENT)
Age: 69
Discharge: HOME | End: 2024-08-21
Payer: MEDICARE

## 2024-08-21 LAB
BASOPHILS # BLD: 0.03 K/UL (ref 0–0.2)
BASOPHILS NFR BLD: 0 % (ref 0–2)
EOSINOPHIL # BLD: 0.07 K/UL (ref 0.05–0.5)
EOSINOPHILS RELATIVE PERCENT: 1 % (ref 0–6)
ERYTHROCYTE [DISTWIDTH] IN BLOOD BY AUTOMATED COUNT: 14.6 % (ref 11.5–15)
HCT VFR BLD AUTO: 40.2 % (ref 34–48)
HGB BLD-MCNC: 12.1 G/DL (ref 11.5–15.5)
IMM GRANULOCYTES # BLD AUTO: 0.04 K/UL (ref 0–0.58)
IMM GRANULOCYTES NFR BLD: 0 % (ref 0–5)
LYMPHOCYTES NFR BLD: 0.89 K/UL (ref 1.5–4)
LYMPHOCYTES RELATIVE PERCENT: 10 % (ref 20–42)
MCH RBC QN AUTO: 32.9 PG (ref 26–35)
MCHC RBC AUTO-ENTMCNC: 30.1 G/DL (ref 32–34.5)
MCV RBC AUTO: 109.2 FL (ref 80–99.9)
MONOCYTES NFR BLD: 0.66 K/UL (ref 0.1–0.95)
MONOCYTES NFR BLD: 7 % (ref 2–12)
NEUTROPHILS NFR BLD: 82 % (ref 43–80)
NEUTS SEG NFR BLD: 7.68 K/UL (ref 1.8–7.3)
PLATELET # BLD AUTO: 150 K/UL (ref 130–450)
PMV BLD AUTO: 12.5 FL (ref 7–12)
RBC # BLD AUTO: 3.68 M/UL (ref 3.5–5.5)
WBC OTHER # BLD: 9.4 K/UL (ref 4.5–11.5)

## 2024-08-21 PROCEDURE — 36415 COLL VENOUS BLD VENIPUNCTURE: CPT

## 2024-08-21 PROCEDURE — 85025 COMPLETE CBC W/AUTO DIFF WBC: CPT

## 2024-08-22 ENCOUNTER — TELEPHONE (OUTPATIENT)
Dept: TRANSPLANT | Facility: CLINIC | Age: 69
End: 2024-08-22
Payer: MEDICARE

## 2024-08-23 NOTE — TELEPHONE ENCOUNTER
Called and discussed pt medication request.  Her doctor is inquiring to see if she could take AZO.  Discussed and is OK to take.  Informed patient.   Pt also updated that she is no longer taking Acyclovir.  Med taken off her med list.

## 2024-08-26 ENCOUNTER — TELEPHONE (OUTPATIENT)
Dept: OTHER | Age: 69
End: 2024-08-26

## 2024-08-26 ENCOUNTER — HOSPITAL ENCOUNTER (OUTPATIENT)
Dept: OTHER | Age: 69
Setting detail: THERAPIES SERIES
Discharge: HOME | End: 2024-08-26
Payer: MEDICARE

## 2024-08-26 VITALS
SYSTOLIC BLOOD PRESSURE: 139 MMHG | HEART RATE: 82 BPM | DIASTOLIC BLOOD PRESSURE: 66 MMHG | BODY MASS INDEX: 36.87 KG/M2 | OXYGEN SATURATION: 97 % | RESPIRATION RATE: 18 BRPM | WEIGHT: 195 LBS

## 2024-08-26 LAB
ANION GAP SERPL CALCULATED.3IONS-SCNC: 13 MMOL/L (ref 7–16)
BNP SERPL-MCNC: 2132 PG/ML (ref 0–450)
BUN SERPL-MCNC: 28 MG/DL (ref 6–23)
CALCIUM SERPL-MCNC: 10.5 MG/DL (ref 8.6–10.2)
CHLORIDE SERPL-SCNC: 100 MMOL/L (ref 98–107)
CO2 SERPL-SCNC: 25 MMOL/L (ref 22–29)
CREAT SERPL-MCNC: 1.7 MG/DL (ref 0.5–1)
GFR, ESTIMATED: 32 ML/MIN/1.73M2
GLUCOSE SERPL-MCNC: 128 MG/DL (ref 74–99)
POTASSIUM SERPL-SCNC: 4.7 MMOL/L (ref 3.5–5)
SODIUM SERPL-SCNC: 138 MMOL/L (ref 132–146)

## 2024-08-26 PROCEDURE — 99214 OFFICE O/P EST MOD 30 MIN: CPT

## 2024-08-26 PROCEDURE — 6360000002 HC RX W HCPCS: Performed by: NURSE PRACTITIONER

## 2024-08-26 PROCEDURE — 80048 BASIC METABOLIC PNL TOTAL CA: CPT

## 2024-08-26 PROCEDURE — 83880 ASSAY OF NATRIURETIC PEPTIDE: CPT

## 2024-08-26 PROCEDURE — 36415 COLL VENOUS BLD VENIPUNCTURE: CPT

## 2024-08-26 PROCEDURE — 96374 THER/PROPH/DIAG INJ IV PUSH: CPT

## 2024-08-26 PROCEDURE — 2580000003 HC RX 258: Performed by: NURSE PRACTITIONER

## 2024-08-26 RX ORDER — BUMETANIDE 0.25 MG/ML
2 INJECTION INTRAMUSCULAR; INTRAVENOUS ONCE
Status: COMPLETED | OUTPATIENT
Start: 2024-08-26 | End: 2024-08-26

## 2024-08-26 RX ORDER — CALCITRIOL 0.5 UG/1
0.25 CAPSULE, LIQUID FILLED ORAL DAILY
COMMUNITY

## 2024-08-26 RX ORDER — SODIUM CHLORIDE 0.9 % (FLUSH) 0.9 %
5-40 SYRINGE (ML) INJECTION 2 TIMES DAILY
Status: DISCONTINUED | OUTPATIENT
Start: 2024-08-26 | End: 2024-08-27 | Stop reason: HOSPADM

## 2024-08-26 RX ADMIN — SODIUM CHLORIDE, PRESERVATIVE FREE 10 ML: 5 INJECTION INTRAVENOUS at 13:05

## 2024-08-26 RX ADMIN — BUMETANIDE 2 MG: 0.25 INJECTION INTRAMUSCULAR; INTRAVENOUS at 13:04

## 2024-08-26 NOTE — RESULT ENCOUNTER NOTE
CHF clinic visit and labs reviewed   Mildly hypervolemic on RN exam     BNP 2580>>1518>>2132  BUN 23>>28   Cr 1.7>1.7    Please ask her to take an additional 1 mg of Bumex on Tuesday, then resume her regular Monday, Wednesday, Friday schedule    Follow up as scheduled, sooner if needed

## 2024-08-26 NOTE — TELEPHONE ENCOUNTER
----- Message from KATINA Monson - CNS sent at 8/26/2024  1:54 PM EDT -----  CHF clinic visit and labs reviewed   Mildly hypervolemic on RN exam     BNP 2580>>1518>>2132  BUN 23>>28   Cr 1.7>1.7    Please ask her to take an additional 1 mg of Bumex on Tuesday, then resume her regular Monday, Wednesday, Friday schedule    Follow up as scheduled, sooner if needed     Called and spoke to patient and gave her the above information. She verbalized an understanding and repeated information back to me.

## 2024-08-26 NOTE — PROGRESS NOTES
Iron (ug/dL)   Date Value   06/21/2024 63     TIBC (ug/dL)   Date Value   06/21/2024 219 (L)     Hepatic:  AST (U/L)   Date Value   07/25/2024 10     ALT (U/L)   Date Value   07/25/2024 6     Total Bilirubin (mg/dL)   Date Value   07/25/2024 0.4     Alkaline Phosphatase (U/L)   Date Value   07/25/2024 73     INR:  INR (no units)   Date Value   05/18/2022 1.1         Wt Readings from Last 3 Encounters:   08/26/24 88.5 kg (195 lb)   08/12/24 85.3 kg (188 lb)   08/08/24 85.7 kg (189 lb)       ASSESSMENT/PLAN:    [] Euvolemic          [x] Mildly Hypervolemic, with increase from baseline:  [] Shortness of breath/PUGA  [] JVD  [] HJR  [] Abnormal lung assessment:   [] Orthopnea  [] PND  [] Decreased urinary response to oral diuretic   [] Scrotal swelling   [x] Lower extremity edema  [] Compression stockings provided  [] Decline in functional capacity (unable to perform activities they had previously been able to do)  [x] Weight gain     [x] IV diuretics given No  [] Provider notified of recurrent IV diuretic use    Additional Notes:      Patient presents for follow up appointment. Patient was last seen on 8/12/24. At that appointment patient was advised to resume Bumex 1 mg daily, three times weekly and follow up two weeks.     Patient did see  on 8/8/24 and he re-started her metoprolol tartrate 12/5 mg BId d/t elevated heart rate.     Patient was started on Omnicef on 8/12/24 for +UTI for 14 days.     [x]Lab work obtained    [x] Patient/Family Educated On:  [x] HF zones (Green, Yellow, Red) and aware of when to take action   [x] Daily weights  [] Scale provided   [x] Low sodium diet (2000 mg)  Barriers to compliance  [] Refuses to monitor diet  [] Socioeconomic difficulties  [] Unable to cook for self (use of frozen meals, can goods, etc)  [] CHF CHW consulted  [x] Low sodium meal deliver- using [facility] meal delivery  [] Dietician consulted   [] Low sodium recipes provided  [] Sodium free seasoning provided

## 2024-08-28 ENCOUNTER — OFFICE VISIT (OUTPATIENT)
Dept: TRANSPLANT | Facility: CLINIC | Age: 69
End: 2024-08-28
Payer: MEDICARE

## 2024-08-28 VITALS
DIASTOLIC BLOOD PRESSURE: 80 MMHG | SYSTOLIC BLOOD PRESSURE: 134 MMHG | HEIGHT: 62 IN | TEMPERATURE: 98 F | RESPIRATION RATE: 18 BRPM | OXYGEN SATURATION: 98 % | BODY MASS INDEX: 35.85 KG/M2 | WEIGHT: 194.8 LBS | HEART RATE: 79 BPM

## 2024-08-28 DIAGNOSIS — E21.0 HYPERPARATHYROID BONE DISEASE (MULTI): ICD-10-CM

## 2024-08-28 DIAGNOSIS — Z09 HOSPITAL DISCHARGE FOLLOW-UP: Primary | ICD-10-CM

## 2024-08-28 DIAGNOSIS — Z94.0 KIDNEY REPLACED BY TRANSPLANT (HHS-HCC): ICD-10-CM

## 2024-08-28 DIAGNOSIS — E83.52 HYPERCALCEMIA: ICD-10-CM

## 2024-08-28 DIAGNOSIS — Z79.899 IMMUNOSUPPRESSIVE MANAGEMENT ENCOUNTER FOLLOWING KIDNEY TRANSPLANT (HHS-HCC): ICD-10-CM

## 2024-08-28 DIAGNOSIS — Z94.0 IMMUNOSUPPRESSIVE MANAGEMENT ENCOUNTER FOLLOWING KIDNEY TRANSPLANT (HHS-HCC): ICD-10-CM

## 2024-08-28 DIAGNOSIS — I10 ESSENTIAL HYPERTENSION: ICD-10-CM

## 2024-08-28 PROCEDURE — 99215 OFFICE O/P EST HI 40 MIN: CPT | Performed by: INTERNAL MEDICINE

## 2024-08-28 PROCEDURE — 1126F AMNT PAIN NOTED NONE PRSNT: CPT | Performed by: INTERNAL MEDICINE

## 2024-08-28 PROCEDURE — 3075F SYST BP GE 130 - 139MM HG: CPT | Performed by: INTERNAL MEDICINE

## 2024-08-28 PROCEDURE — 3008F BODY MASS INDEX DOCD: CPT | Performed by: INTERNAL MEDICINE

## 2024-08-28 PROCEDURE — 3079F DIAST BP 80-89 MM HG: CPT | Performed by: INTERNAL MEDICINE

## 2024-08-28 RX ORDER — CLOZAPINE 25 MG/1
25 TABLET ORAL DAILY
COMMUNITY

## 2024-08-28 ASSESSMENT — PAIN SCALES - GENERAL: PAINLEVEL: 0-NO PAIN

## 2024-08-28 NOTE — PROGRESS NOTES
TRANSPLANT NEPHROLOGY :   OUTPATIENT CLINIC NOTE      SERVICE DATE : 08/28/2024    REASON FOR VISIT/CHIEF COMPLAINT:  S/P  TRANSPLANT SURGERY  IMMUNOSUPPRESSIVE MEDICATION MANAGEMENT  BLOOD PRESSURE MANAGEMENT    HPI:    Ms. Jarad Paulino is a 69 y.o. 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     Admitted 6/1/24 at OSH for UTI, Pancreatitis and transferred to [facility]/discharged to SNF. 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 Transferred to [facility] on 6/2. ID consulted.    Med changes since last visit BY PCP - dosage of Clozaril to 25 mg in pm, per psychiatrist.  She was recently diagnosed with C Diff but now finished vancomycin   Acyclovir , allopurinol were also stopped.  ID recommended AZO, D-mannose supplement  Now on Bumex 1 mg MWF since discharge from rehab  Lab drawn at [facility] 8/26; stable Cr 1.7 but Calcium was elevated 10.5. No PTH, VIT D levels were processed.     Patient is doing well overall. No new complaints. Denied chest pain, SOB, RIVAS, Palpitation. Normal urination and bowel movement. Normal gait and no weakness of arms/legs. No cough, runny nose, sore throat, cold symptoms, or rash. No hearing loss. Normal vision.No problems with his sleep, mood and function. No recent infection, hospitalization, surgery or ER visits.      ROS:  Review of  14 systems was performed system by system. See HPI. Otherwise, the symptoms were negative.    PAST MEDICAL HISTORY:  Past Medical History:   Diagnosis Date    Asymptomatic human immunodeficiency virus (hiv) infection status (Multi) 04/12/2016    HIV, asymptomatic    Kidney  transplant status ([facility]-H[facility])     Status post kidney transplant    Personal history of other infectious and parasitic diseases 04/12/2016    History of hepatitis B virus infection    Personal history of other infectious and parasitic diseases 04/12/2016    History of hepatitis C virus infection    Postprocedural hypoparathyroidism (Multi)     Status post subtotal parathyroidectomy    Postprocedural hypothyroidism     Status post partial thyroidectomy    Urinary tract infection, site not specified 04/08/2016    Acute UTI        PAST SURGICAL HISTORY:  Past Surgical History:   Procedure Laterality Date    OTHER SURGICAL HISTORY  05/05/2016    Renal Transplant        SOCIAL HISTORY:  Social History     Socioeconomic History    Marital status:      Spouse name: Not on file    Number of children: Not on file    Years of education: Not on file    Highest education level: Not on file   Occupational History    Not on file   Tobacco Use    Smoking status: Unknown    Smokeless tobacco: Not on file   Vaping Use    Vaping status: Every Day   Substance and Sexual Activity    Alcohol use: Not on file    Drug use: Not on file    Sexual activity: Not on file   Other Topics Concern    Not on file   Social History Narrative    Not on file     Social Determinants of Health     Financial Resource Strain: Patient Unable To Answer (6/4/2024)    Overall Financial Resource Strain (CARDIA)     Difficulty of Paying Living Expenses: Patient unable to answer   Food Insecurity: No Food Insecurity (6/1/2024)    Received from [facility]    Hunger Vital Sign     Worried About Running Out of Food in the Last Year: Never true     Ran Out of Food in the Last Year: Never true   Transportation Needs: Patient Unable To Answer (6/4/2024)    PRAPARE - Transportation     Lack of Transportation (Medical): Patient unable to answer     Lack of Transportation (Non-Medical): Patient unable to  answer   Physical Activity: Inactive (10/25/2023)    Received from [facility]    Exercise Vital Sign     Days of Exercise per Week: 0 days     Minutes of Exercise per Session: 0 min   Stress: No Stress Concern Present (10/25/2023)    Received from [facility]    [employer] - Occupational Stress Questionnaire     Feeling of Stress : Only a little   Social Connections: Moderately Isolated (10/25/2023)    Received from [facility]    Social Connection and Isolation Panel [NHANES]     Frequency of Communication with Friends and Family: Three times a week     Frequency of Social Gatherings with Friends and Family: Twice a week     Attends Yarsanism Services: 1 to 4 times per year     Active Member of Clubs or Organizations: No     Attends Club or Organization Meetings: Never     Marital Status:    Intimate Partner Violence: Not on file   Housing Stability: Patient Unable To Answer (6/4/2024)    Housing Stability Vital Sign     Unable to Pay for Housing in the Last Year: Patient unable to answer     Number of Places Lived in the Last Year: 1     Unstable Housing in the Last Year: Patient unable to answer       FAMILY HISTORY:  No family history on file.    MEDICATION LIST:  Current Outpatient Medications   Medication Instructions    acetaminophen (Tylenol) 325 mg tablet 2 tablets, oral, Every 6 hours PRN    allopurinol (ZYLOPRIM) 100 mg, oral, Daily    apixaban (ELIQUIS) 5 mg, oral, 2 times daily    carvedilol (COREG) 6.25 mg, oral, 2 times daily    cloZAPine (CLOZARIL) 25 mg, oral, Daily    cycloSPORINE (NEORAL) 75 mg, oral, Every 12 hours scheduled (0630,1830)    desvenlafaxine (PRISTIQ) 100 mg, oral, Daily    ferrous sulfate (325 mg ferrous sulfate) 325 mg, oral, Daily with breakfast    folic acid (FOLVITE) 1 mg,  oral, Daily    levothyroxine (SYNTHROID, LEVOXYL) 50 mcg, oral, Every morning    mycophenolate (CELLCEPT) 1,000 mg, oral, 2 times daily    oxyCODONE-acetaminophen (Percocet) 5-325 mg tablet 1 tablet, oral, 4 times daily PRN    polyethylene glycol (GLYCOLAX, MIRALAX) 17 g, oral, Daily    predniSONE (DELTASONE) 5 mg, oral, Daily    sennosides-docusate sodium (Padmaja-Colace) 8.6-50 mg tablet 2 tablets, oral, 2 times daily    simvastatin (ZOCOR) 40 mg, oral, Nightly    sodium chloride (Ocean) 0.65 % nasal spray 1 spray, Each Nostril, 4 times daily PRN    sodium zirconium cyclosilicate (LOKELMA) 10 g, oral, Daily       ALLERGY  Allergies   Allergen Reactions    Macrodantin [Nitrofurantoin Macrocrystal] GI Upset    Sulfa (Sulfonamide Antibiotics) Rash       PHYSICAL EXAM:    Visit Vitals  /80 (BP Location: Right arm, Patient Position: Sitting, BP Cuff Size: Large adult)   Pulse 79   Temp 36.7 °C (98 °F) (Tympanic)   Smoking Status Unknown          Vital signs - reviewed. Acceptable BP at this office visit.   General Appearance - NAD, Good speech, oriented and alert  HEENT - Supple. Not pale. No jaundice. No cervical lymphadenopathy. Pharynx and tonsils are not injected.  CVS - RRR. Normal S1/S2. No murmur, click , rub or gallop  Lungs- clear to auscultation bilaterally  Abdomen - soft , not tender, no guarding, no rigidity. No hepatosplenomegaly. Normal bowel sounds. No masses and ascites. S/P Kidney transplant .  Transplanted kidney is not tender.   Musculoskeletal /Extremities - no edema. Full ROM. No joint tenderness.   Neuro/Psych - appropriate mood and affect. Motor power V/V all extremities. CN I -XII were grossly intact.  Skin - No visible rash      LABS:    Lab Results   Component Value Date    WBC 10.4 06/15/2024    HGB 11.1 (L) 06/15/2024    HCT 34.7 (L) 06/15/2024     06/15/2024    CHOL 108 06/04/2024    TRIG 82 06/04/2024    HDL 29.2 06/04/2024    ALT 33 06/15/2024    AST 28 06/15/2024      06/15/2024    K 5.2 06/15/2024     06/15/2024    CREATININE 1.00 06/15/2024    BUN 27 (H) 06/15/2024    CO2 30 06/15/2024    TSH 0.45 06/03/2024    INR 1.5 (H) 06/04/2024    HGBA1C 5.6 12/27/2022     par    ASSESSMENT AND PLAN:    Ms. Jarad Paulino is a 69 y.o. female  who is here for follow up s/p kidney transplant.    TRANSPLANT DATE: 10/30/2015 (Kidney)      1. ESRD S/P kidney transplant   - Creatinine last check was :  Lab Results   Component Value Date    CREATININE 1.00 06/15/2024     -Ensure adequate hydration  - Avoid nephrotoxic medications, NSAIDs, and IV contrast.    2. Immunosuppression  -Tacrolimus level last check was acceptable  -Continue current immunosuppression regimen.    3. Electrolytes  Lab Results   Component Value Date    GLUCOSE 159 (H) 06/15/2024    CALCIUM 9.9 06/15/2024     06/15/2024    K 5.2 06/15/2024    CO2 30 06/15/2024     06/15/2024    BUN 27 (H) 06/15/2024    CREATININE 1.00 06/15/2024     -Acceptable from last lab drawn    4. Hypertension  Blood Pressures         8/28/2024  1155             BP: 134/80          -Home  BP had been acceptable  -Encourage to monitor home BP  -Continue current anti hypertensive medication    5. Bone Mineral Disease/Osteoporosis  Lab Results   Component Value Date    .2 (H) 06/06/2024    CALCIUM 9.9 06/15/2024    CAION 1.40 (H) 06/15/2024    PHOS 3.0 06/15/2024    VITD25 35 06/04/2024     -check VIT D, PTH with next lab  - Consider DEXA every 2-3 years , defer to PCP    6.Anemia  Lab Results   Component Value Date    WBC 10.4 06/15/2024    HGB 11.1 (L) 06/15/2024    HCT 34.7 (L) 06/15/2024     (H) 06/15/2024     06/15/2024     Lab Results   Component Value Date    IRON 69 03/30/2020    TIBC 334 03/30/2020    FERRITIN 525 (H) 03/30/2020     -asymptomatic  - Continue to monitor  -check iron studies and ferritin. Will consider AALIYAH as needed.  - No indications for blood transfusion     7.Health maintenance and  vaccination  - Flu shot during flu season annually  - Cancer screening is up to date per the patient    Summary  - Last lab , calcium level was elevated. Would hold calcium/vitamin D supplement and recheck lab with parathyroid/ vitamin D levels.  - For swollen legs, would increase Bumex to 1 mg daily  - Ok to take OTC D mannose for recurrent UTI and low dose probiotics  - Next lab in 1-2 weeks after increasing bumex   - Next follow up with Dr. Constantino is in September.  - RTC 6 months    Deidra Robb    Transplant Nephrology

## 2024-08-28 NOTE — PATIENT INSTRUCTIONS
- Last lab , calcium level was elevated. Would hold calcium/vitamin D supplement and recheck lab with parathyroid/ vitamin D levels.  - For swollen legs, would increase Bumex to 1 mg daily  - Ok to take OTC D mannose for recurrent UTI and low dose probiotics  - Next lab in 1-2 weeks after increasing bumex   - Next follow up with Dr. Constantino is in September.  - RTC 6 months

## 2024-09-12 ENCOUNTER — HOSPITAL ENCOUNTER (OUTPATIENT)
Age: 69
Discharge: HOME | End: 2024-09-12
Payer: MEDICARE

## 2024-09-12 ENCOUNTER — HOSPITAL ENCOUNTER (OUTPATIENT)
Dept: OTHER | Age: 69
Setting detail: THERAPIES SERIES
Discharge: HOME | End: 2024-09-12
Payer: MEDICARE

## 2024-09-12 VITALS
OXYGEN SATURATION: 96 % | HEART RATE: 74 BPM | RESPIRATION RATE: 16 BRPM | DIASTOLIC BLOOD PRESSURE: 71 MMHG | SYSTOLIC BLOOD PRESSURE: 124 MMHG | WEIGHT: 196 LBS | BODY MASS INDEX: 37.06 KG/M2

## 2024-09-12 LAB
25(OH)D3 SERPL-MCNC: 36.9 NG/ML (ref 30–100)
ALBUMIN: 4.1 G/DL (ref 3.5–5.2)
ANION GAP SERPL CALCULATED.3IONS-SCNC: 14 MMOL/L (ref 7–16)
BNP SERPL-MCNC: 1481 PG/ML (ref 0–450)
BUN SERPL-MCNC: 41 MG/DL (ref 6–23)
CALCIUM SERPL-MCNC: 10.1 MG/DL (ref 8.6–10.2)
CHLORIDE SERPL-SCNC: 98 MMOL/L (ref 98–107)
CO2 SERPL-SCNC: 24 MMOL/L (ref 22–29)
CREAT SERPL-MCNC: 1.5 MG/DL (ref 0.5–1)
CREAT UR-MCNC: 79.8 MG/DL (ref 29–226)
ERYTHROCYTE [DISTWIDTH] IN BLOOD BY AUTOMATED COUNT: 13.9 % (ref 11.5–15)
GFR, ESTIMATED: 39 ML/MIN/1.73M2
GLUCOSE SERPL-MCNC: 121 MG/DL (ref 74–99)
HCT VFR BLD AUTO: 41 % (ref 34–48)
HGB BLD-MCNC: 12.6 G/DL (ref 11.5–15.5)
MCH RBC QN AUTO: 32.5 PG (ref 26–35)
MCHC RBC AUTO-ENTMCNC: 30.7 G/DL (ref 32–34.5)
MCV RBC AUTO: 105.7 FL (ref 80–99.9)
PHOSPHATE SERPL-MCNC: 3.7 MG/DL (ref 2.5–4.5)
PLATELET # BLD AUTO: 159 K/UL (ref 130–450)
PMV BLD AUTO: 12.9 FL (ref 7–12)
POTASSIUM SERPL-SCNC: 4.7 MMOL/L (ref 3.5–5)
PTH-INTACT SERPL-MCNC: 111.5 PG/ML (ref 15–65)
RBC # BLD AUTO: 3.88 M/UL (ref 3.5–5.5)
SODIUM SERPL-SCNC: 136 MMOL/L (ref 132–146)
TOTAL PROTEIN, URINE: 19 MG/DL (ref 0–12)
WBC OTHER # BLD: 9.8 K/UL (ref 4.5–11.5)

## 2024-09-12 PROCEDURE — 80069 RENAL FUNCTION PANEL: CPT

## 2024-09-12 PROCEDURE — 99214 OFFICE O/P EST MOD 30 MIN: CPT

## 2024-09-12 PROCEDURE — 83880 ASSAY OF NATRIURETIC PEPTIDE: CPT

## 2024-09-12 PROCEDURE — 85027 COMPLETE CBC AUTOMATED: CPT

## 2024-09-12 PROCEDURE — 36415 COLL VENOUS BLD VENIPUNCTURE: CPT

## 2024-09-12 PROCEDURE — 84156 ASSAY OF PROTEIN URINE: CPT

## 2024-09-12 PROCEDURE — 80158 DRUG ASSAY CYCLOSPORINE: CPT

## 2024-09-12 PROCEDURE — 83970 ASSAY OF PARATHORMONE: CPT

## 2024-09-12 PROCEDURE — 82306 VITAMIN D 25 HYDROXY: CPT

## 2024-09-12 PROCEDURE — 82570 ASSAY OF URINE CREATININE: CPT

## 2024-09-16 LAB — CYCLOSPORINE BLD-MCNC: 80 UG/L (ref 50–300)

## 2024-09-17 ENCOUNTER — HOSPITAL ENCOUNTER (OUTPATIENT)
Age: 69
Discharge: HOME | End: 2024-09-17
Payer: MEDICARE

## 2024-09-17 ENCOUNTER — OFFICE VISIT (OUTPATIENT)
Dept: INTERNAL MEDICINE CLINIC | Age: 69
End: 2024-09-17
Payer: MEDICARE

## 2024-09-17 VITALS
HEART RATE: 83 BPM | OXYGEN SATURATION: 96 % | SYSTOLIC BLOOD PRESSURE: 124 MMHG | HEIGHT: 61 IN | DIASTOLIC BLOOD PRESSURE: 78 MMHG | TEMPERATURE: 97.5 F | BODY MASS INDEX: 37 KG/M2 | WEIGHT: 196 LBS

## 2024-09-17 DIAGNOSIS — I82.519 CHRONIC DEEP VEIN THROMBOSIS (DVT) OF FEMORAL VEIN, UNSPECIFIED LATERALITY (HCC): ICD-10-CM

## 2024-09-17 DIAGNOSIS — E03.9 ACQUIRED HYPOTHYROIDISM: ICD-10-CM

## 2024-09-17 DIAGNOSIS — N18.32 STAGE 3B CHRONIC KIDNEY DISEASE (HCC): ICD-10-CM

## 2024-09-17 DIAGNOSIS — E55.9 VITAMIN D DEFICIENCY: ICD-10-CM

## 2024-09-17 DIAGNOSIS — J44.1 CHRONIC OBSTRUCTIVE PULMONARY DISEASE WITH (ACUTE) EXACERBATION (HCC): ICD-10-CM

## 2024-09-17 DIAGNOSIS — E66.01 SEVERE OBESITY (BMI 35.0-39.9) WITH COMORBIDITY (HCC): Primary | ICD-10-CM

## 2024-09-17 DIAGNOSIS — I82.562 CHRONIC DEEP VEIN THROMBOSIS (DVT) OF CALF MUSCLE VEIN OF LEFT LOWER EXTREMITY (HCC): ICD-10-CM

## 2024-09-17 DIAGNOSIS — F31.9 BIPOLAR 1 DISORDER (HCC): ICD-10-CM

## 2024-09-17 DIAGNOSIS — I50.22 CHRONIC SYSTOLIC (CONGESTIVE) HEART FAILURE (HCC): ICD-10-CM

## 2024-09-17 DIAGNOSIS — R73.9 HYPERGLYCEMIA: ICD-10-CM

## 2024-09-17 DIAGNOSIS — R73.03 PRE-DIABETES: ICD-10-CM

## 2024-09-17 DIAGNOSIS — Z94.0 KIDNEY TRANSPLANTED: ICD-10-CM

## 2024-09-17 DIAGNOSIS — E78.2 MIXED HYPERLIPIDEMIA: ICD-10-CM

## 2024-09-17 LAB
BASOPHILS # BLD: 0.03 K/UL (ref 0–0.2)
BASOPHILS NFR BLD: 0 % (ref 0–2)
EOSINOPHIL # BLD: 0.05 K/UL (ref 0.05–0.5)
EOSINOPHILS RELATIVE PERCENT: 1 % (ref 0–6)
ERYTHROCYTE [DISTWIDTH] IN BLOOD BY AUTOMATED COUNT: 13.8 % (ref 11.5–15)
HCT VFR BLD AUTO: 40.2 % (ref 34–48)
HGB BLD-MCNC: 12 G/DL (ref 11.5–15.5)
IMM GRANULOCYTES # BLD AUTO: 0.05 K/UL (ref 0–0.58)
IMM GRANULOCYTES NFR BLD: 1 % (ref 0–5)
LYMPHOCYTES NFR BLD: 0.81 K/UL (ref 1.5–4)
LYMPHOCYTES RELATIVE PERCENT: 9 % (ref 20–42)
MCH RBC QN AUTO: 31.7 PG (ref 26–35)
MCHC RBC AUTO-ENTMCNC: 29.9 G/DL (ref 32–34.5)
MCV RBC AUTO: 106.3 FL (ref 80–99.9)
MONOCYTES NFR BLD: 0.48 K/UL (ref 0.1–0.95)
MONOCYTES NFR BLD: 5 % (ref 2–12)
NEUTROPHILS NFR BLD: 84 % (ref 43–80)
NEUTS SEG NFR BLD: 7.67 K/UL (ref 1.8–7.3)
PLATELET # BLD AUTO: 148 K/UL (ref 130–450)
PMV BLD AUTO: 12.9 FL (ref 7–12)
RBC # BLD AUTO: 3.78 M/UL (ref 3.5–5.5)
WBC OTHER # BLD: 9.1 K/UL (ref 4.5–11.5)

## 2024-09-17 PROCEDURE — G8427 DOCREV CUR MEDS BY ELIG CLIN: HCPCS | Performed by: NEUROMUSCULOSKELETAL MEDICINE & OMM

## 2024-09-17 PROCEDURE — 3023F SPIROM DOC REV: CPT | Performed by: NEUROMUSCULOSKELETAL MEDICINE & OMM

## 2024-09-17 PROCEDURE — 85025 COMPLETE CBC W/AUTO DIFF WBC: CPT

## 2024-09-17 PROCEDURE — G8399 PT W/DXA RESULTS DOCUMENT: HCPCS | Performed by: NEUROMUSCULOSKELETAL MEDICINE & OMM

## 2024-09-17 PROCEDURE — 3074F SYST BP LT 130 MM HG: CPT | Performed by: NEUROMUSCULOSKELETAL MEDICINE & OMM

## 2024-09-17 PROCEDURE — 36415 COLL VENOUS BLD VENIPUNCTURE: CPT

## 2024-09-17 PROCEDURE — 1090F PRES/ABSN URINE INCON ASSESS: CPT | Performed by: NEUROMUSCULOSKELETAL MEDICINE & OMM

## 2024-09-17 PROCEDURE — 1036F TOBACCO NON-USER: CPT | Performed by: NEUROMUSCULOSKELETAL MEDICINE & OMM

## 2024-09-17 PROCEDURE — 3078F DIAST BP <80 MM HG: CPT | Performed by: NEUROMUSCULOSKELETAL MEDICINE & OMM

## 2024-09-17 PROCEDURE — 1123F ACP DISCUSS/DSCN MKR DOCD: CPT | Performed by: NEUROMUSCULOSKELETAL MEDICINE & OMM

## 2024-09-17 PROCEDURE — G2211 COMPLEX E/M VISIT ADD ON: HCPCS | Performed by: NEUROMUSCULOSKELETAL MEDICINE & OMM

## 2024-09-17 PROCEDURE — 99214 OFFICE O/P EST MOD 30 MIN: CPT | Performed by: NEUROMUSCULOSKELETAL MEDICINE & OMM

## 2024-09-17 PROCEDURE — 3017F COLORECTAL CA SCREEN DOC REV: CPT | Performed by: NEUROMUSCULOSKELETAL MEDICINE & OMM

## 2024-09-17 PROCEDURE — G8417 CALC BMI ABV UP PARAM F/U: HCPCS | Performed by: NEUROMUSCULOSKELETAL MEDICINE & OMM

## 2024-09-17 RX ORDER — ACETAMINOPHEN 325 MG/1
650 TABLET ORAL EVERY 6 HOURS PRN
COMMUNITY

## 2024-09-17 ASSESSMENT — ENCOUNTER SYMPTOMS
ABDOMINAL DISTENTION: 0
CHOKING: 0
COUGH: 0
WHEEZING: 0
CHEST TIGHTNESS: 0
NAUSEA: 0
ABDOMINAL PAIN: 0
BLOOD IN STOOL: 0
SHORTNESS OF BREATH: 0
DIARRHEA: 0
BACK PAIN: 0
VOMITING: 0

## 2024-09-25 ENCOUNTER — HOSPITAL ENCOUNTER (OUTPATIENT)
Dept: OTHER | Age: 69
Setting detail: THERAPIES SERIES
Discharge: HOME | End: 2024-09-25
Payer: MEDICARE

## 2024-09-25 VITALS
OXYGEN SATURATION: 96 % | HEART RATE: 69 BPM | DIASTOLIC BLOOD PRESSURE: 61 MMHG | BODY MASS INDEX: 37.25 KG/M2 | WEIGHT: 197 LBS | RESPIRATION RATE: 16 BRPM | SYSTOLIC BLOOD PRESSURE: 128 MMHG

## 2024-09-25 LAB
ANION GAP SERPL CALCULATED.3IONS-SCNC: 14 MMOL/L (ref 7–16)
BNP SERPL-MCNC: 1287 PG/ML (ref 0–450)
BUN SERPL-MCNC: 47 MG/DL (ref 6–23)
CALCIUM SERPL-MCNC: 10 MG/DL (ref 8.6–10.2)
CHLORIDE SERPL-SCNC: 100 MMOL/L (ref 98–107)
CO2 SERPL-SCNC: 21 MMOL/L (ref 22–29)
CREAT SERPL-MCNC: 1.7 MG/DL (ref 0.5–1)
GFR, ESTIMATED: 32 ML/MIN/1.73M2
GLUCOSE SERPL-MCNC: 136 MG/DL (ref 74–99)
POTASSIUM SERPL-SCNC: 4.6 MMOL/L (ref 3.5–5)
SODIUM SERPL-SCNC: 135 MMOL/L (ref 132–146)

## 2024-09-25 PROCEDURE — 36415 COLL VENOUS BLD VENIPUNCTURE: CPT

## 2024-09-25 PROCEDURE — 83880 ASSAY OF NATRIURETIC PEPTIDE: CPT

## 2024-09-25 PROCEDURE — 80048 BASIC METABOLIC PNL TOTAL CA: CPT

## 2024-09-25 PROCEDURE — 99214 OFFICE O/P EST MOD 30 MIN: CPT

## 2024-10-15 ENCOUNTER — HOSPITAL ENCOUNTER (OUTPATIENT)
Age: 69
Discharge: HOME | End: 2024-10-15
Payer: MEDICARE

## 2024-10-15 DIAGNOSIS — N39.0 RECURRENT UTI (URINARY TRACT INFECTION): ICD-10-CM

## 2024-10-15 LAB
BACTERIA URNS QL MICRO: ABNORMAL
BASOPHILS # BLD: 0.04 K/UL (ref 0–0.2)
BASOPHILS NFR BLD: 0 % (ref 0–2)
EOSINOPHIL # BLD: 0.09 K/UL (ref 0.05–0.5)
EOSINOPHILS RELATIVE PERCENT: 1 % (ref 0–6)
EPI CELLS #/AREA URNS HPF: ABNORMAL /HPF
ERYTHROCYTE [DISTWIDTH] IN BLOOD BY AUTOMATED COUNT: 13.5 % (ref 11.5–15)
HCT VFR BLD AUTO: 42 % (ref 34–48)
HGB BLD-MCNC: 12.8 G/DL (ref 11.5–15.5)
IMM GRANULOCYTES # BLD AUTO: 0.08 K/UL (ref 0–0.58)
IMM GRANULOCYTES NFR BLD: 1 % (ref 0–5)
LYMPHOCYTES NFR BLD: 1.13 K/UL (ref 1.5–4)
LYMPHOCYTES RELATIVE PERCENT: 10 % (ref 20–42)
MCH RBC QN AUTO: 31.2 PG (ref 26–35)
MCHC RBC AUTO-ENTMCNC: 30.5 G/DL (ref 32–34.5)
MCV RBC AUTO: 102.4 FL (ref 80–99.9)
MONOCYTES NFR BLD: 0.84 K/UL (ref 0.1–0.95)
MONOCYTES NFR BLD: 7 % (ref 2–12)
NEUTROPHILS NFR BLD: 81 % (ref 43–80)
NEUTS SEG NFR BLD: 9.23 K/UL (ref 1.8–7.3)
PLATELET # BLD AUTO: 196 K/UL (ref 130–450)
PMV BLD AUTO: 12.3 FL (ref 7–12)
RBC # BLD AUTO: 4.1 M/UL (ref 3.5–5.5)
RBC #/AREA URNS HPF: ABNORMAL /HPF
WBC #/AREA URNS HPF: ABNORMAL /HPF
WBC OTHER # BLD: 11.4 K/UL (ref 4.5–11.5)

## 2024-10-15 PROCEDURE — 87086 URINE CULTURE/COLONY COUNT: CPT

## 2024-10-15 PROCEDURE — 85025 COMPLETE CBC W/AUTO DIFF WBC: CPT

## 2024-10-15 PROCEDURE — 81015 MICROSCOPIC EXAM OF URINE: CPT

## 2024-10-15 PROCEDURE — 36415 COLL VENOUS BLD VENIPUNCTURE: CPT

## 2024-10-15 PROCEDURE — 87077 CULTURE AEROBIC IDENTIFY: CPT

## 2024-10-15 RX ORDER — LEVOTHYROXINE SODIUM 50 UG/1
50 TABLET ORAL DAILY
Qty: 90 TABLET | Refills: 1 | Status: SHIPPED | OUTPATIENT
Start: 2024-10-15

## 2024-10-16 DIAGNOSIS — N39.0 RECURRENT UTI (URINARY TRACT INFECTION): Primary | ICD-10-CM

## 2024-10-18 DIAGNOSIS — N39.0 RECURRENT UTI (URINARY TRACT INFECTION): Primary | ICD-10-CM

## 2024-10-18 RX ORDER — CEFDINIR 300 MG/1
300 CAPSULE ORAL 2 TIMES DAILY
Qty: 28 CAPSULE | Refills: 0 | Status: SHIPPED | OUTPATIENT
Start: 2024-10-18 | End: 2024-11-01

## 2024-10-19 LAB
MICROORGANISM SPEC CULT: ABNORMAL
MICROORGANISM SPEC CULT: ABNORMAL
SPECIMEN DESCRIPTION: ABNORMAL

## 2024-10-30 ENCOUNTER — HOSPITAL ENCOUNTER (EMERGENCY)
Age: 69
Discharge: HOME | End: 2024-10-30
Attending: EMERGENCY MEDICINE
Payer: MEDICARE

## 2024-10-30 ENCOUNTER — APPOINTMENT (OUTPATIENT)
Dept: CT IMAGING | Age: 69
End: 2024-10-30
Payer: MEDICARE

## 2024-10-30 VITALS
TEMPERATURE: 97.9 F | OXYGEN SATURATION: 99 % | DIASTOLIC BLOOD PRESSURE: 82 MMHG | HEART RATE: 82 BPM | SYSTOLIC BLOOD PRESSURE: 144 MMHG | RESPIRATION RATE: 17 BRPM

## 2024-10-30 DIAGNOSIS — S01.01XA LACERATION OF SCALP, INITIAL ENCOUNTER: ICD-10-CM

## 2024-10-30 DIAGNOSIS — S09.90XA INJURY OF HEAD, INITIAL ENCOUNTER: Primary | ICD-10-CM

## 2024-10-30 PROCEDURE — 72125 CT NECK SPINE W/O DYE: CPT

## 2024-10-30 PROCEDURE — 6360000002 HC RX W HCPCS: Performed by: EMERGENCY MEDICINE

## 2024-10-30 PROCEDURE — 90471 IMMUNIZATION ADMIN: CPT | Performed by: EMERGENCY MEDICINE

## 2024-10-30 PROCEDURE — 99284 EMERGENCY DEPT VISIT MOD MDM: CPT

## 2024-10-30 PROCEDURE — 12001 RPR S/N/AX/GEN/TRNK 2.5CM/<: CPT

## 2024-10-30 PROCEDURE — 70450 CT HEAD/BRAIN W/O DYE: CPT

## 2024-10-30 PROCEDURE — 90714 TD VACC NO PRESV 7 YRS+ IM: CPT | Performed by: EMERGENCY MEDICINE

## 2024-10-30 RX ORDER — LIDOCAINE HYDROCHLORIDE 10 MG/ML
5 INJECTION, SOLUTION INFILTRATION; PERINEURAL ONCE
Status: DISCONTINUED | OUTPATIENT
Start: 2024-10-30 | End: 2024-10-30 | Stop reason: HOSPADM

## 2024-10-30 RX ADMIN — CLOSTRIDIUM TETANI TOXOID ANTIGEN (FORMALDEHYDE INACTIVATED) AND CORYNEBACTERIUM DIPHTHERIAE TOXOID ANTIGEN (FORMALDEHYDE INACTIVATED) 0.5 ML: 5; 2 INJECTION, SUSPENSION INTRAMUSCULAR at 16:15

## 2024-10-30 ASSESSMENT — ENCOUNTER SYMPTOMS
NAUSEA: 0
VOMITING: 0
SORE THROAT: 0
SHORTNESS OF BREATH: 0
COUGH: 0
DIARRHEA: 0
BACK PAIN: 0
WHEEZING: 0
EYE PAIN: 0
ABDOMINAL PAIN: 0
CHEST TIGHTNESS: 0

## 2024-10-30 NOTE — ED PROVIDER NOTES
Chief complaint:  Fall      HPI history provided by the patient and report  Patient presents here, on blood thinners, states she was taking her groceries today and tripped over the rug and fell backwards and hit the back of her head, complains of a bruising and small laceration of the head with some general neck pain.  Denies thoracic or lumbar back pain and denies arm or leg pain or injuries and has no extremity numbness, tingling, paresthesia or weakness.  No chest pain or shortness of breath and no lightheadedness or syncope.  No abdominal pain.  Bleeding currently controlled.    Review of Systems   Constitutional:  Negative for chills, diaphoresis, fatigue and fever.   HENT:  Negative for congestion and sore throat.    Eyes:  Negative for pain and visual disturbance.   Respiratory:  Negative for cough, chest tightness, shortness of breath and wheezing.    Cardiovascular:  Negative for chest pain, palpitations and leg swelling.   Gastrointestinal:  Negative for abdominal pain, diarrhea, nausea and vomiting.   Genitourinary:  Negative for dysuria, flank pain and frequency.   Musculoskeletal:  Positive for neck pain. Negative for arthralgias, back pain, gait problem, joint swelling and myalgias.   Skin:  Positive for wound. Negative for rash.   Neurological:  Negative for dizziness, seizures, syncope, weakness, light-headedness, numbness and headaches.   All other systems reviewed and are negative.       Physical Exam  Vitals and nursing note reviewed.   Constitutional:       General: She is not in acute distress.     Appearance: She is well-developed. She is not ill-appearing, toxic-appearing or diaphoretic.      Interventions: Cervical collar in place.   HENT:      Head: Normocephalic. Contusion and laceration present. No raccoon eyes or Matson's sign.      Comments: Small left occipital vertex hematoma with superficial 2 cm laceration.  No other sign of acute head or face injuries.  Bleeding controlled, no  None    Differential includes but not limited to: Laceration, head injury, cervical strain, cervical fracture, skull fracture, brain bleed    Work up includes with interpretations: CT head and cervical spine read by radiology showed no acute process    Advanced directive discussion: None    Treatment in ER: Wound repair, tetanus    Consultations in ER: None    Diagnosis and disposition: Head injury, scalp laceration.  Stable for discharge and outpatient follow-up.    Is this patient to be included in the SEP-1 core measure? No Exclusion criteria - the patient is NOT to be included for SEP-1 Core Measure due to: Infection is not suspected         --------------------------------------------- PAST HISTORY ---------------------------------------------  Past Medical History:  has a past medical history of Abnormal EKG, Acute cystitis without hematuria, Acute cystitis without hematuria, Acute exacerbation of chronic obstructive pulmonary disease (COPD) ([facility]), Acute gout involving toe of right foot, Acute on chronic combined systolic (congestive) and diastolic (congestive) heart failure ([facility]), asthmatic bronchitis, Bipolar disorder ([facility]), Cancer ([facility]), Cerebrovascular disease, Clotting disorder ([facility]), Depression, Hemodialysis patient ([facility]), History of blood transfusion, History of renal transplant, Hyperlipidemia, Hypertension, Hypothyroidism, Hypothyroidism, Leg swelling, Leukocytosis, Lymphedema of both lower extremities, Other emphysema ([facility]), Paroxysmal atrial fibrillation ([facility]), Peripheral vascular disease ([facility]), Renal failure, Sepsis ([facility]), Thrombophlebitis, and Thyroid disease.    Past Surgical History:  has a past surgical history that includes Diagnostic Cardiac Cath Lab Procedure (); femoral bypass (); parathyroidectomy (); Colonoscopy;  section (); Dialysis fistula creation (march and 2012); Kidney transplant (); Kidney transplant (); Kidney transplant (); colectomy

## 2024-11-06 ENCOUNTER — TELEPHONE (OUTPATIENT)
Dept: MAMMOGRAPHY | Age: 69
End: 2024-11-06

## 2024-11-06 DIAGNOSIS — Z12.31 ENCOUNTER FOR SCREENING MAMMOGRAM FOR MALIGNANT NEOPLASM OF BREAST: Primary | ICD-10-CM

## 2024-11-06 NOTE — TELEPHONE ENCOUNTER
Call to patient in regards to upcoming mammogram appointment at [facility] on 11/14/24. Patient is scheduled for diagnostic left breast mammogram, orders placed by Dr. Malave from 10/2023. Patient had screening mammogram last year and subsequent left diagnostic mammogram but according to radiologist report from 10/13/23, patient to return to annual screening mammogram. Patient denies any new breast related issue in either breast and was anticipating annual screening mammogram. Informed patient information will be passed along to Dr. Malave and new order for screening mammogram will be requested. Patient informed date and time of appointment will remain the same of 11/14/24 at 9:30 am. Patient verbalized understanding. Informed routed to Dr. Malave staff for update and order request. Kelli Hooper, CARMENN, RN - Breast Navigator

## 2024-11-12 ENCOUNTER — OFFICE VISIT (OUTPATIENT)
Dept: INTERNAL MEDICINE CLINIC | Age: 69
End: 2024-11-12

## 2024-11-12 VITALS
WEIGHT: 199 LBS | BODY MASS INDEX: 39.07 KG/M2 | HEIGHT: 60 IN | HEART RATE: 84 BPM | TEMPERATURE: 97.4 F | OXYGEN SATURATION: 97 % | SYSTOLIC BLOOD PRESSURE: 124 MMHG | DIASTOLIC BLOOD PRESSURE: 76 MMHG | RESPIRATION RATE: 16 BRPM

## 2024-11-12 DIAGNOSIS — Z94.0 KIDNEY TRANSPLANTED: ICD-10-CM

## 2024-11-12 DIAGNOSIS — J44.1 CHRONIC OBSTRUCTIVE PULMONARY DISEASE WITH (ACUTE) EXACERBATION (HCC): ICD-10-CM

## 2024-11-12 DIAGNOSIS — I82.562 CHRONIC DEEP VEIN THROMBOSIS (DVT) OF CALF MUSCLE VEIN OF LEFT LOWER EXTREMITY (HCC): ICD-10-CM

## 2024-11-12 DIAGNOSIS — E78.2 MIXED HYPERLIPIDEMIA: ICD-10-CM

## 2024-11-12 DIAGNOSIS — N18.32 STAGE 3B CHRONIC KIDNEY DISEASE (HCC): ICD-10-CM

## 2024-11-12 DIAGNOSIS — Z48.02 REMOVAL OF STAPLES: Primary | ICD-10-CM

## 2024-11-12 DIAGNOSIS — E03.9 ACQUIRED HYPOTHYROIDISM: ICD-10-CM

## 2024-11-12 DIAGNOSIS — F31.9 BIPOLAR 1 DISORDER (HCC): ICD-10-CM

## 2024-11-12 RX ORDER — SIMVASTATIN 40 MG
40 TABLET ORAL NIGHTLY
Qty: 90 TABLET | Refills: 2 | Status: SHIPPED | OUTPATIENT
Start: 2024-11-12

## 2024-11-12 SDOH — ECONOMIC STABILITY: INCOME INSECURITY: HOW HARD IS IT FOR YOU TO PAY FOR THE VERY BASICS LIKE FOOD, HOUSING, MEDICAL CARE, AND HEATING?: NOT HARD AT ALL

## 2024-11-12 SDOH — ECONOMIC STABILITY: FOOD INSECURITY: WITHIN THE PAST 12 MONTHS, THE FOOD YOU BOUGHT JUST DIDN'T LAST AND YOU DIDN'T HAVE MONEY TO GET MORE.: NEVER TRUE

## 2024-11-12 SDOH — ECONOMIC STABILITY: FOOD INSECURITY: WITHIN THE PAST 12 MONTHS, YOU WORRIED THAT YOUR FOOD WOULD RUN OUT BEFORE YOU GOT MONEY TO BUY MORE.: NEVER TRUE

## 2024-11-12 ASSESSMENT — ENCOUNTER SYMPTOMS
DIARRHEA: 0
SHORTNESS OF BREATH: 0
ABDOMINAL DISTENTION: 0
CHEST TIGHTNESS: 0
ABDOMINAL PAIN: 0
VOMITING: 0
WHEEZING: 0
CHOKING: 0
COUGH: 0
BACK PAIN: 0
CONSTIPATION: 0
NAUSEA: 0

## 2024-11-12 NOTE — PROGRESS NOTES
TRANSESOPHAGEAL ECHOCARDIOGRAM N/A 02/28/2022    TRANSESOPHAGEAL ECHOCARDIOGRAM WITH BUBBLE STUDY performed by Rafa Renteria MD at [facility]        The ASCVD Risk score (Noble DK, et al., 2019) failed to calculate for the following reasons:    The valid total cholesterol range is 130 to 320 mg/dL       Educational materials and/or home exercises printed for patient's review and were included inpatient instructions on his/her After Visit Summary and given to patient at the end of visit.     regarding above diagnosis, including possible risks and complications,  especially if left uncontrolled.     Counseled regarding the possible side effects, risks, benefits and alternatives to treatment; patient and/or guardian verbalizes understanding, agrees, feels comfortable with and wishes to proceed withabove treatment plan.     Advised patient to call with any new medication issues, and read all Rx infofrom pharmacy to assure aware of all possible risks and side effects of medication before taking.   age and gender appropriate health screening exams and vaccinations.  Advised patient regarding importance of keeping up with recommended health maintenance and to schedule as soon as possible if overdue, as this isimportant in assessing for undiagnosed pathology, especially cancer, as well as protecting against potentially harmful/life threatening disease.          Patient and/or guardian verbalizes understanding andagrees with above counseling, assessment and plan.     All questions answered.    The patient (or guardian, if applicable) and other individuals in attendance with the patient were advised that Artificial Intelligence will be utilized during this visit to record, process the conversation to generate a clinical note and to support improvement of the AI technology. The patient (or guardian, if applicable) and other individuals in attendance at the appointment consented to the use of AI, including the

## 2024-11-14 ENCOUNTER — HOSPITAL ENCOUNTER (OUTPATIENT)
Dept: MAMMOGRAPHY | Age: 69
Discharge: HOME | End: 2024-11-16
Attending: NEUROMUSCULOSKELETAL MEDICINE & OMM
Payer: MEDICARE

## 2024-11-14 VITALS — BODY MASS INDEX: 36.8 KG/M2 | HEIGHT: 62 IN | WEIGHT: 200 LBS

## 2024-11-14 DIAGNOSIS — Z12.31 ENCOUNTER FOR SCREENING MAMMOGRAM FOR MALIGNANT NEOPLASM OF BREAST: ICD-10-CM

## 2024-11-14 PROCEDURE — 77063 BREAST TOMOSYNTHESIS BI: CPT

## 2024-11-18 ENCOUNTER — HOSPITAL ENCOUNTER (OUTPATIENT)
Age: 69
Discharge: HOME | End: 2024-11-18
Payer: MEDICARE

## 2024-11-18 ENCOUNTER — HOSPITAL ENCOUNTER (OUTPATIENT)
Dept: OTHER | Age: 69
Setting detail: THERAPIES SERIES
Discharge: HOME | End: 2024-11-18
Payer: MEDICARE

## 2024-11-18 VITALS
SYSTOLIC BLOOD PRESSURE: 113 MMHG | HEART RATE: 67 BPM | DIASTOLIC BLOOD PRESSURE: 61 MMHG | OXYGEN SATURATION: 97 % | RESPIRATION RATE: 16 BRPM | BODY MASS INDEX: 36.76 KG/M2 | WEIGHT: 201 LBS

## 2024-11-18 DIAGNOSIS — E03.9 ACQUIRED HYPOTHYROIDISM: ICD-10-CM

## 2024-11-18 DIAGNOSIS — E55.9 VITAMIN D DEFICIENCY: ICD-10-CM

## 2024-11-18 DIAGNOSIS — J44.1 CHRONIC OBSTRUCTIVE PULMONARY DISEASE WITH (ACUTE) EXACERBATION (HCC): ICD-10-CM

## 2024-11-18 DIAGNOSIS — E78.2 MIXED HYPERLIPIDEMIA: ICD-10-CM

## 2024-11-18 DIAGNOSIS — N18.32 STAGE 3B CHRONIC KIDNEY DISEASE (HCC): ICD-10-CM

## 2024-11-18 DIAGNOSIS — R73.9 HYPERGLYCEMIA: ICD-10-CM

## 2024-11-18 LAB
25(OH)D3 SERPL-MCNC: 35.3 NG/ML (ref 30–100)
ALBUMIN SERPL-MCNC: 4.2 G/DL (ref 3.5–5.2)
ALP SERPL-CCNC: 87 U/L (ref 35–104)
ALT SERPL-CCNC: 10 U/L (ref 0–32)
ANION GAP SERPL CALCULATED.3IONS-SCNC: 11 MMOL/L (ref 7–16)
AST SERPL-CCNC: 12 U/L (ref 0–31)
BASOPHILS # BLD: 0.05 K/UL (ref 0–0.2)
BASOPHILS NFR BLD: 1 % (ref 0–2)
BILIRUB SERPL-MCNC: 0.6 MG/DL (ref 0–1.2)
BNP SERPL-MCNC: 1354 PG/ML (ref 0–450)
BUN SERPL-MCNC: 39 MG/DL (ref 6–23)
CALCIUM SERPL-MCNC: 10.1 MG/DL (ref 8.6–10.2)
CHLORIDE SERPL-SCNC: 100 MMOL/L (ref 98–107)
CHOLEST SERPL-MCNC: 145 MG/DL
CO2 SERPL-SCNC: 24 MMOL/L (ref 22–29)
CREAT SERPL-MCNC: 1.6 MG/DL (ref 0.5–1)
EOSINOPHIL # BLD: 0.16 K/UL (ref 0.05–0.5)
EOSINOPHILS RELATIVE PERCENT: 2 % (ref 0–6)
ERYTHROCYTE [DISTWIDTH] IN BLOOD BY AUTOMATED COUNT: 14.1 % (ref 11.5–15)
GFR, ESTIMATED: 35 ML/MIN/1.73M2
GLUCOSE SERPL-MCNC: 104 MG/DL (ref 74–99)
HBA1C MFR BLD: 5.9 % (ref 4–5.6)
HCT VFR BLD AUTO: 38.4 % (ref 34–48)
HDLC SERPL-MCNC: 45 MG/DL
HGB BLD-MCNC: 11.6 G/DL (ref 11.5–15.5)
IMM GRANULOCYTES # BLD AUTO: 0.04 K/UL (ref 0–0.58)
IMM GRANULOCYTES NFR BLD: 1 % (ref 0–5)
LDLC SERPL CALC-MCNC: 73 MG/DL
LYMPHOCYTES NFR BLD: 1.25 K/UL (ref 1.5–4)
LYMPHOCYTES RELATIVE PERCENT: 14 % (ref 20–42)
MCH RBC QN AUTO: 31.4 PG (ref 26–35)
MCHC RBC AUTO-ENTMCNC: 30.2 G/DL (ref 32–34.5)
MCV RBC AUTO: 104.1 FL (ref 80–99.9)
MONOCYTES NFR BLD: 0.75 K/UL (ref 0.1–0.95)
MONOCYTES NFR BLD: 9 % (ref 2–12)
NEUTROPHILS NFR BLD: 75 % (ref 43–80)
NEUTS SEG NFR BLD: 6.6 K/UL (ref 1.8–7.3)
PLATELET, FLUORESCENCE: 138 K/UL (ref 130–450)
PMV BLD AUTO: 13 FL (ref 7–12)
POTASSIUM SERPL-SCNC: 4.1 MMOL/L (ref 3.5–5)
PROT SERPL-MCNC: 6.7 G/DL (ref 6.4–8.3)
RBC # BLD AUTO: 3.69 M/UL (ref 3.5–5.5)
SODIUM SERPL-SCNC: 135 MMOL/L (ref 132–146)
TRIGL SERPL-MCNC: 137 MG/DL
TSH SERPL DL<=0.05 MIU/L-ACNC: 0.74 UIU/ML (ref 0.27–4.2)
VLDLC SERPL CALC-MCNC: 27 MG/DL
WBC OTHER # BLD: 8.9 K/UL (ref 4.5–11.5)

## 2024-11-18 PROCEDURE — 84443 ASSAY THYROID STIM HORMONE: CPT

## 2024-11-18 PROCEDURE — 85025 COMPLETE CBC W/AUTO DIFF WBC: CPT

## 2024-11-18 PROCEDURE — 82306 VITAMIN D 25 HYDROXY: CPT

## 2024-11-18 PROCEDURE — 83036 HEMOGLOBIN GLYCOSYLATED A1C: CPT

## 2024-11-18 PROCEDURE — 80053 COMPREHEN METABOLIC PANEL: CPT

## 2024-11-18 PROCEDURE — 36415 COLL VENOUS BLD VENIPUNCTURE: CPT

## 2024-11-18 PROCEDURE — 80061 LIPID PANEL: CPT

## 2024-11-18 PROCEDURE — 83880 ASSAY OF NATRIURETIC PEPTIDE: CPT

## 2024-11-18 PROCEDURE — 99214 OFFICE O/P EST MOD 30 MIN: CPT

## 2024-11-18 NOTE — PROGRESS NOTES
40 MG tablet Take 1 tablet by mouth nightly Yes Brennon Malave DO   levothyroxine (SYNTHROID) 50 MCG tablet Take 1 tablet by mouth daily Yes Brennon Malave DO   acetaminophen (TYLENOL) 325 MG tablet Take 2 tablets by mouth every 6 hours as needed for Pain Yes Radha Tovar MD   cycloSPORINE (SANDIMMUNE) 100 MG capsule Take 1 capsule by mouth 2 times daily Yes Radha Tovar MD   metoprolol tartrate (LOPRESSOR) 25 MG tablet 0.5mg bid Yes Brennon Malave DO   desvenlafaxine succinate (PRISTIQ) 25 MG TB24 extended release tablet Take 4 tablets by mouth daily Yes Radha Tovar MD   cloZAPine (CLOZARIL) 25 MG tablet Take 1 tablet by mouth daily Yes Radha Tovar MD   predniSONE (DELTASONE) 5 MG tablet Take 1 tablet by mouth daily Yes Brennon Malave DO   folic acid (FOLVITE) 1 MG tablet Take 1 tablet by mouth daily Yes Brennon Malave DO   ferrous sulfate (IRON 325) 325 (65 Fe) MG tablet Take 1 tablet by mouth daily (with breakfast) Yes Radha Tovar MD   apixaban (ELIQUIS) 5 MG TABS tablet Take 1 tablet by mouth 2 times daily Yes Brooklynn Howell APRN - CNP   mycophenolate (CELLCEPT) 500 MG tablet Take 2 tablets by mouth 2 times daily Yes Radha Tovar MD   NONFORMULARY daily D-Mannose 1300mg  Patient not taking: Reported on 11/18/2024  Radha Tovar MD   bumetanide (BUMEX) 1 MG tablet Take 1 tablet by mouth three times a week  Patient taking differently: Take 1 tablet by mouth daily Patient reports she takes Bumex daily since 8/28/24  Brooklynn Howell APRN - CNP        GUIDELINE DIRECTED MEDICAL THERAPY for HFpEF:  SGLT2i:  (2a indication)  No  Aldosterone antagonist: (2b indication)  No  ARNI/ACE I/ARB: (2b indication, with LVEF on lower end of spectrum)  No  Beta blocker: Toprol 12.5 mg BID   Diuretic management: Bumex 1 mg daily    HEART FAILURE FOCUSED PHYSICAL EXAMINATION:    Vitals:    11/18/24 0915   BP: 113/61   Pulse: 67

## 2024-11-19 ENCOUNTER — TELEPHONE (OUTPATIENT)
Dept: TRANSPLANT | Facility: HOSPITAL | Age: 69
End: 2024-11-19
Payer: MEDICARE

## 2024-11-19 DIAGNOSIS — E55.9 VITAMIN D DEFICIENCY: ICD-10-CM

## 2024-11-19 DIAGNOSIS — Z94.0 KIDNEY REPLACED BY TRANSPLANT (HHS-HCC): ICD-10-CM

## 2024-11-19 NOTE — TELEPHONE ENCOUNTER
Scheduled neph appt. PT needs a standing order for lab work, every three months. She was told the last order was denied because it was signed by a nurse, not Dr. Robb. Was told by the nurse there, it must be signed by Dr. Robb Last blood draw was 7/25      [facility] at 11:20 AM (20 min)  Wednesday February 26, 2025  Appointment Provider:[place] [facility] [place] [id number] in [address]

## 2024-11-22 ENCOUNTER — HOSPITAL ENCOUNTER (OUTPATIENT)
Age: 69
Discharge: HOME | End: 2024-11-22
Payer: MEDICARE

## 2024-11-22 DIAGNOSIS — N39.0 RECURRENT UTI (URINARY TRACT INFECTION): ICD-10-CM

## 2024-11-22 LAB
BILIRUB UR QL STRIP: NEGATIVE
CLARITY UR: CLEAR
COLOR UR: YELLOW
GLUCOSE UR STRIP-MCNC: NEGATIVE MG/DL
HGB UR QL STRIP.AUTO: NEGATIVE
KETONES UR STRIP-MCNC: NEGATIVE MG/DL
LEUKOCYTE ESTERASE UR QL STRIP: ABNORMAL
NITRITE UR QL STRIP: NEGATIVE
PH UR STRIP: 6 [PH] (ref 5–9)
PROT UR STRIP-MCNC: NEGATIVE MG/DL
RBC #/AREA URNS HPF: ABNORMAL /HPF
SP GR UR STRIP: 1.01 (ref 1–1.03)
UROBILINOGEN UR STRIP-ACNC: 0.2 EU/DL (ref 0–1)
WBC #/AREA URNS HPF: ABNORMAL /HPF

## 2024-11-22 PROCEDURE — 81001 URINALYSIS AUTO W/SCOPE: CPT

## 2024-11-22 PROCEDURE — 87077 CULTURE AEROBIC IDENTIFY: CPT

## 2024-11-22 PROCEDURE — 87086 URINE CULTURE/COLONY COUNT: CPT

## 2024-11-24 LAB
MICROORGANISM SPEC CULT: ABNORMAL
SERVICE CMNT-IMP: ABNORMAL
SPECIMEN DESCRIPTION: ABNORMAL

## 2024-11-27 DIAGNOSIS — N39.0 UTI (URINARY TRACT INFECTION), BACTERIAL: Primary | ICD-10-CM

## 2024-11-27 DIAGNOSIS — A49.9 UTI (URINARY TRACT INFECTION), BACTERIAL: Primary | ICD-10-CM

## 2024-11-27 RX ORDER — CEFDINIR 300 MG/1
300 CAPSULE ORAL DAILY
Qty: 30 CAPSULE | Refills: 0 | Status: SHIPPED | OUTPATIENT
Start: 2024-11-27 | End: 2024-12-27

## 2024-11-27 NOTE — PROGRESS NOTES
Results       Procedure Component Value Units Date/Time    Culture, Urine [[id number]]  (Abnormal)  (Susceptibility) Collected: 11/22/24 1142    Order Status: Completed Specimen: Urine, clean catch Updated: 11/24/24 0654     Specimen Description .CLEAN CATCH URINE     Special Requests Site: Urine     Culture KLEBSIELLA PNEUMONIAE SSP PNEUMONIAE >100,000 CFU/ML    Susceptibility        Klebsiella pneumoniae ssp pneumoniae      BACTERIAL SUSCEPTIBILITY PANEL BONNIE      ceFAZolin <=4  Sensitive      cefepime <=0.12  Sensitive      cefotaxime <=0.25  Sensitive      cefOXitin <=4  Sensitive      cefTAZidime <=1  Sensitive      ciprofloxacin <=0.06  Sensitive      levofloxacin <=0.12  Sensitive      meropenem <=0.25  Sensitive      nitrofurantoin 64  Intermediate      piperacillin-tazobactam <=4  Sensitive      trimethoprim-sulfamethoxazole <=20  Sensitive                                 PT WAS HAVING UTI SX  HAS FREQUENCY BURNING INC YELLOW   TAKING IN FLUIDS  Orders Placed This Encounter    LYSSA - Will Rizzo MD, Urology, [place]     Referral Priority:   Routine     Referral Type:   Eval and Treat     Referral Reason:   Specialty Services Required     Referred to Provider:   Brennon Caldwell MD     Requested Specialty:   Urology     Number of Visits Requested:   1    cefdinir (OMNICEF) 300 MG capsule     Sig: Take 1 capsule by mouth daily     Dispense:  30 capsule     Refill:  0     Future Appointments         Provider Specialty Dept Phone    12/17/2024 10:30 AM Brennon Malave DO Internal Medicine [phone]    12/30/2024 9:45 AM [facility] ROOM 2 Cardiology [phone]    1/6/2025 1:00 PM Silvestre Nash MD Cardiology [phone]          F/U 2 WEEKS

## 2024-12-05 DIAGNOSIS — D68.9 CLOTTING DISORDER (HCC): Primary | ICD-10-CM

## 2024-12-05 NOTE — TELEPHONE ENCOUNTER
Last Appointment:  11/12/2024  Future Appointments   Date Time Provider Department Center   12/17/2024 10:30 AM Brennon Malave DO [facility] DEP   12/18/2024  9:00 AM Pushpa Wright MD [facility]F   12/30/2024  9:45 AM [facility] CHF ROOM 2 [facility]   1/6/2025  1:00 PM Silvestre Nash MD [facility]        Requested Prescriptions     Pending Prescriptions Disp Refills    apixaban (ELIQUIS) 5 MG TABS tablet 180 tablet 3     Sig: Take 1 tablet by mouth 2 times daily

## 2024-12-12 ENCOUNTER — TELEPHONE (OUTPATIENT)
Dept: TRANSPLANT | Facility: HOSPITAL | Age: 69
End: 2024-12-12
Payer: MEDICARE

## 2024-12-12 NOTE — TELEPHONE ENCOUNTER
Patient called requesting to speak with coordinator about  having her standing lab orders mailed to her .  Patient call back number is [phone] .

## 2024-12-17 ENCOUNTER — HOSPITAL ENCOUNTER (OUTPATIENT)
Age: 69
Discharge: HOME | End: 2024-12-17
Payer: MEDICARE

## 2024-12-17 ENCOUNTER — OFFICE VISIT (OUTPATIENT)
Dept: INTERNAL MEDICINE CLINIC | Age: 69
End: 2024-12-17
Payer: MEDICARE

## 2024-12-17 VITALS
OXYGEN SATURATION: 93 % | BODY MASS INDEX: 38.46 KG/M2 | SYSTOLIC BLOOD PRESSURE: 122 MMHG | DIASTOLIC BLOOD PRESSURE: 70 MMHG | WEIGHT: 209 LBS | HEIGHT: 62 IN | TEMPERATURE: 97.2 F | HEART RATE: 70 BPM

## 2024-12-17 DIAGNOSIS — E78.2 MIXED HYPERLIPIDEMIA: ICD-10-CM

## 2024-12-17 DIAGNOSIS — F31.9 BIPOLAR 1 DISORDER (HCC): ICD-10-CM

## 2024-12-17 DIAGNOSIS — J44.1 CHRONIC OBSTRUCTIVE PULMONARY DISEASE WITH (ACUTE) EXACERBATION (HCC): ICD-10-CM

## 2024-12-17 DIAGNOSIS — I50.22 CHRONIC SYSTOLIC (CONGESTIVE) HEART FAILURE (HCC): ICD-10-CM

## 2024-12-17 DIAGNOSIS — Z94.0 KIDNEY TRANSPLANTED: ICD-10-CM

## 2024-12-17 DIAGNOSIS — E03.9 ACQUIRED HYPOTHYROIDISM: ICD-10-CM

## 2024-12-17 DIAGNOSIS — Z00.00 MEDICARE ANNUAL WELLNESS VISIT, SUBSEQUENT: Primary | ICD-10-CM

## 2024-12-17 DIAGNOSIS — N18.32 STAGE 3B CHRONIC KIDNEY DISEASE (HCC): ICD-10-CM

## 2024-12-17 LAB
25(OH)D3 SERPL-MCNC: 34.9 NG/ML (ref 30–100)
ALBUMIN: 4.1 G/DL (ref 3.5–5.2)
ANION GAP SERPL CALCULATED.3IONS-SCNC: 16 MMOL/L (ref 7–16)
BASOPHILS # BLD: 0.05 K/UL (ref 0–0.2)
BASOPHILS NFR BLD: 1 % (ref 0–2)
BUN SERPL-MCNC: 36 MG/DL (ref 6–23)
CALCIUM SERPL-MCNC: 9.9 MG/DL (ref 8.6–10.2)
CHLORIDE SERPL-SCNC: 101 MMOL/L (ref 98–107)
CO2 SERPL-SCNC: 21 MMOL/L (ref 22–29)
CREAT SERPL-MCNC: 1.5 MG/DL (ref 0.5–1)
EOSINOPHIL # BLD: 0.05 K/UL (ref 0.05–0.5)
EOSINOPHILS RELATIVE PERCENT: 1 % (ref 0–6)
ERYTHROCYTE [DISTWIDTH] IN BLOOD BY AUTOMATED COUNT: 14 % (ref 11.5–15)
GFR, ESTIMATED: 37 ML/MIN/1.73M2
GLUCOSE SERPL-MCNC: 276 MG/DL (ref 74–99)
HCT VFR BLD AUTO: 39.5 % (ref 34–48)
HGB BLD-MCNC: 12.2 G/DL (ref 11.5–15.5)
IMM GRANULOCYTES # BLD AUTO: 0.06 K/UL (ref 0–0.58)
IMM GRANULOCYTES NFR BLD: 1 % (ref 0–5)
LYMPHOCYTES NFR BLD: 0.76 K/UL (ref 1.5–4)
LYMPHOCYTES RELATIVE PERCENT: 7 % (ref 20–42)
MAGNESIUM SERPL-MCNC: 2 MG/DL (ref 1.6–2.6)
MCH RBC QN AUTO: 31.9 PG (ref 26–35)
MCHC RBC AUTO-ENTMCNC: 30.9 G/DL (ref 32–34.5)
MCV RBC AUTO: 103.1 FL (ref 80–99.9)
MONOCYTES NFR BLD: 0.47 K/UL (ref 0.1–0.95)
MONOCYTES NFR BLD: 4 % (ref 2–12)
NEUTROPHILS NFR BLD: 87 % (ref 43–80)
NEUTS SEG NFR BLD: 9.27 K/UL (ref 1.8–7.3)
PHOSPHATE SERPL-MCNC: 3.7 MG/DL (ref 2.5–4.5)
PLATELET, FLUORESCENCE: 143 K/UL (ref 130–450)
PMV BLD AUTO: 13.1 FL (ref 7–12)
POTASSIUM SERPL-SCNC: 4.8 MMOL/L (ref 3.5–5)
PTH-INTACT SERPL-MCNC: 147.8 PG/ML (ref 15–65)
RBC # BLD AUTO: 3.83 M/UL (ref 3.5–5.5)
SODIUM SERPL-SCNC: 138 MMOL/L (ref 132–146)
TOTAL PROTEIN, URINE: 20 MG/DL (ref 0–12)
WBC OTHER # BLD: 10.7 K/UL (ref 4.5–11.5)

## 2024-12-17 PROCEDURE — 1160F RVW MEDS BY RX/DR IN RCRD: CPT | Performed by: NEUROMUSCULOSKELETAL MEDICINE & OMM

## 2024-12-17 PROCEDURE — 84156 ASSAY OF PROTEIN URINE: CPT

## 2024-12-17 PROCEDURE — 80158 DRUG ASSAY CYCLOSPORINE: CPT

## 2024-12-17 PROCEDURE — 83970 ASSAY OF PARATHORMONE: CPT

## 2024-12-17 PROCEDURE — 3078F DIAST BP <80 MM HG: CPT | Performed by: NEUROMUSCULOSKELETAL MEDICINE & OMM

## 2024-12-17 PROCEDURE — 3017F COLORECTAL CA SCREEN DOC REV: CPT | Performed by: NEUROMUSCULOSKELETAL MEDICINE & OMM

## 2024-12-17 PROCEDURE — 1123F ACP DISCUSS/DSCN MKR DOCD: CPT | Performed by: NEUROMUSCULOSKELETAL MEDICINE & OMM

## 2024-12-17 PROCEDURE — 3074F SYST BP LT 130 MM HG: CPT | Performed by: NEUROMUSCULOSKELETAL MEDICINE & OMM

## 2024-12-17 PROCEDURE — 85025 COMPLETE CBC W/AUTO DIFF WBC: CPT

## 2024-12-17 PROCEDURE — 36415 COLL VENOUS BLD VENIPUNCTURE: CPT

## 2024-12-17 PROCEDURE — 80069 RENAL FUNCTION PANEL: CPT

## 2024-12-17 PROCEDURE — 1159F MED LIST DOCD IN RCRD: CPT | Performed by: NEUROMUSCULOSKELETAL MEDICINE & OMM

## 2024-12-17 PROCEDURE — G8482 FLU IMMUNIZE ORDER/ADMIN: HCPCS | Performed by: NEUROMUSCULOSKELETAL MEDICINE & OMM

## 2024-12-17 PROCEDURE — G0439 PPPS, SUBSEQ VISIT: HCPCS | Performed by: NEUROMUSCULOSKELETAL MEDICINE & OMM

## 2024-12-17 PROCEDURE — 83735 ASSAY OF MAGNESIUM: CPT

## 2024-12-17 PROCEDURE — 82306 VITAMIN D 25 HYDROXY: CPT

## 2024-12-17 RX ORDER — PREDNISONE 5 MG/1
5 TABLET ORAL DAILY
Qty: 90 TABLET | Refills: 1 | Status: SHIPPED | OUTPATIENT
Start: 2024-12-17

## 2024-12-17 RX ORDER — FOLIC ACID 1 MG/1
1 TABLET ORAL DAILY
Qty: 90 TABLET | Refills: 1 | Status: SHIPPED | OUTPATIENT
Start: 2024-12-17

## 2024-12-17 ASSESSMENT — PATIENT HEALTH QUESTIONNAIRE - PHQ9
6. FEELING BAD ABOUT YOURSELF - OR THAT YOU ARE A FAILURE OR HAVE LET YOURSELF OR YOUR FAMILY DOWN: NOT AT ALL
7. TROUBLE CONCENTRATING ON THINGS, SUCH AS READING THE NEWSPAPER OR WATCHING TELEVISION: NOT AT ALL
2. FEELING DOWN, DEPRESSED OR HOPELESS: NOT AT ALL
8. MOVING OR SPEAKING SO SLOWLY THAT OTHER PEOPLE COULD HAVE NOTICED. OR THE OPPOSITE, BEING SO FIGETY OR RESTLESS THAT YOU HAVE BEEN MOVING AROUND A LOT MORE THAN USUAL: NOT AT ALL
1. LITTLE INTEREST OR PLEASURE IN DOING THINGS: NOT AT ALL
3. TROUBLE FALLING OR STAYING ASLEEP: NOT AT ALL
5. POOR APPETITE OR OVEREATING: NOT AT ALL
9. THOUGHTS THAT YOU WOULD BE BETTER OFF DEAD, OR OF HURTING YOURSELF: NOT AT ALL
SUM OF ALL RESPONSES TO PHQ QUESTIONS 1-9: 0
10. IF YOU CHECKED OFF ANY PROBLEMS, HOW DIFFICULT HAVE THESE PROBLEMS MADE IT FOR YOU TO DO YOUR WORK, TAKE CARE OF THINGS AT HOME, OR GET ALONG WITH OTHER PEOPLE: NOT DIFFICULT AT ALL
SUM OF ALL RESPONSES TO PHQ QUESTIONS 1-9: 0
4. FEELING TIRED OR HAVING LITTLE ENERGY: NOT AT ALL
SUM OF ALL RESPONSES TO PHQ9 QUESTIONS 1 & 2: 0

## 2024-12-17 ASSESSMENT — LIFESTYLE VARIABLES
HOW MANY STANDARD DRINKS CONTAINING ALCOHOL DO YOU HAVE ON A TYPICAL DAY: PATIENT DOES NOT DRINK
HOW OFTEN DO YOU HAVE A DRINK CONTAINING ALCOHOL: NEVER

## 2024-12-17 NOTE — PROGRESS NOTES
Medicare Annual Wellness Visit    Natali Wolff is here for Medicare AWV    Assessment & Plan   Medicare annual wellness visit, subsequent  Mixed hyperlipidemia  Acquired hypothyroidism  Chronic obstructive pulmonary disease with (acute) exacerbation (HCC)  Comments:  Chronic condition well-controlled on her current pulmonary toiletry.  Bipolar 1 disorder (HCC)  Stage 3b chronic kidney disease (HCC)  Comments:  Chronic condition well-controlled being seen and managed by nephrology  Chronic systolic (congestive) heart failure (HCC)  Comments:  Chronic condition well-controlled being seen and managed by cardiologist.  Kidney transplanted  -     folic acid (FOLVITE) 1 MG tablet; Take 1 tablet by mouth daily, Disp-90 tablet, R-1Normal  -     predniSONE (DELTASONE) 5 MG tablet; Take 1 tablet by mouth daily, Disp-90 tablet, R-1Normal    Recommendations for Preventive Services Due: see orders and patient instructions/AVS.  Recommended screening schedule for the next 5-10 years is provided to the patient in written form: see Patient Instructions/AVS.     Return in 3 months (on 3/17/2025) for Medicare Annual Wellness Visit in 1 year, to monitor medical conditions.     Subjective   The following acute and/or chronic problems were also addressed today:  Patient to be seen by Urologist in [place] on 12- sent by JONNY Antonio, for frequent UTI's.    Patient's complete Health Risk Assessment and screening values have been reviewed and are found in Flowsheets. The following problems were reviewed today and where indicated follow up appointments were made and/or referrals ordered.    Positive Risk Factor Screenings with Interventions:     Cognitive:   Clock Drawing Test (CDT): (!) Abnormal  Words recalled: 3 Words Recalled  Total Score: 3  Total Score Interpretation: Normal Mini-Cog  Interventions:  Patient declines any further evaluation or treatment            Inactivity:  On average, how many days per week do

## 2024-12-18 ENCOUNTER — TELEPHONE (OUTPATIENT)
Dept: CARDIOLOGY CLINIC | Age: 69
End: 2024-12-18

## 2024-12-20 LAB — CYCLOSPORINE BLD-MCNC: 67 UG/L (ref 50–300)

## 2024-12-30 ENCOUNTER — HOSPITAL ENCOUNTER (OUTPATIENT)
Dept: OTHER | Age: 69
Setting detail: THERAPIES SERIES
Discharge: HOME | End: 2024-12-30
Payer: MEDICARE

## 2024-12-30 ENCOUNTER — HOSPITAL ENCOUNTER (OUTPATIENT)
Age: 69
Discharge: HOME | End: 2024-12-30
Payer: MEDICARE

## 2024-12-30 VITALS
RESPIRATION RATE: 16 BRPM | SYSTOLIC BLOOD PRESSURE: 126 MMHG | OXYGEN SATURATION: 96 % | BODY MASS INDEX: 37.86 KG/M2 | HEART RATE: 72 BPM | DIASTOLIC BLOOD PRESSURE: 67 MMHG | WEIGHT: 207 LBS

## 2024-12-30 LAB
25(OH)D3 SERPL-MCNC: 32.6 NG/ML (ref 30–100)
ALBUMIN SERPL-MCNC: 4.2 G/DL (ref 3.5–5.2)
ALP SERPL-CCNC: 105 U/L (ref 35–104)
ALT SERPL-CCNC: 10 U/L (ref 0–32)
ANION GAP SERPL CALCULATED.3IONS-SCNC: 11 MMOL/L (ref 7–16)
AST SERPL-CCNC: 13 U/L (ref 0–31)
BACTERIA URNS QL MICRO: ABNORMAL
BASOPHILS # BLD: 0.03 K/UL (ref 0–0.2)
BASOPHILS NFR BLD: 0 % (ref 0–2)
BILIRUB SERPL-MCNC: 0.5 MG/DL (ref 0–1.2)
BILIRUB UR QL STRIP: NEGATIVE
BNP SERPL-MCNC: 1367 PG/ML (ref 0–450)
BUN SERPL-MCNC: 35 MG/DL (ref 6–23)
CALCIUM SERPL-MCNC: 10.1 MG/DL (ref 8.6–10.2)
CHLORIDE SERPL-SCNC: 101 MMOL/L (ref 98–107)
CLARITY UR: CLEAR
CO2 SERPL-SCNC: 25 MMOL/L (ref 22–29)
COLOR UR: YELLOW
CREAT SERPL-MCNC: 1.5 MG/DL (ref 0.5–1)
CREAT UR-MCNC: 112.5 MG/DL (ref 29–226)
EOSINOPHIL # BLD: 0.07 K/UL (ref 0.05–0.5)
EOSINOPHILS RELATIVE PERCENT: 1 % (ref 0–6)
ERYTHROCYTE [DISTWIDTH] IN BLOOD BY AUTOMATED COUNT: 14 % (ref 11.5–15)
FERRITIN SERPL-MCNC: 403 NG/ML
GFR, ESTIMATED: 38 ML/MIN/1.73M2
GLUCOSE SERPL-MCNC: 129 MG/DL (ref 74–99)
GLUCOSE UR STRIP-MCNC: NEGATIVE MG/DL
HCT VFR BLD AUTO: 41.2 % (ref 34–48)
HGB BLD-MCNC: 12.5 G/DL (ref 11.5–15.5)
HGB UR QL STRIP.AUTO: NEGATIVE
IMM GRANULOCYTES # BLD AUTO: 0.05 K/UL (ref 0–0.58)
IMM GRANULOCYTES NFR BLD: 1 % (ref 0–5)
IRON SATN MFR SERPL: 29 % (ref 15–50)
IRON SERPL-MCNC: 87 UG/DL (ref 37–145)
KETONES UR STRIP-MCNC: ABNORMAL MG/DL
LEUKOCYTE ESTERASE UR QL STRIP: ABNORMAL
LYMPHOCYTES NFR BLD: 0.84 K/UL (ref 1.5–4)
LYMPHOCYTES RELATIVE PERCENT: 9 % (ref 20–42)
MAGNESIUM SERPL-MCNC: 1.8 MG/DL (ref 1.6–2.6)
MCH RBC QN AUTO: 31.3 PG (ref 26–35)
MCHC RBC AUTO-ENTMCNC: 30.3 G/DL (ref 32–34.5)
MCV RBC AUTO: 103 FL (ref 80–99.9)
MONOCYTES NFR BLD: 0.66 K/UL (ref 0.1–0.95)
MONOCYTES NFR BLD: 7 % (ref 2–12)
NEUTROPHILS NFR BLD: 83 % (ref 43–80)
NEUTS SEG NFR BLD: 7.84 K/UL (ref 1.8–7.3)
NITRITE UR QL STRIP: POSITIVE
PH UR STRIP: 5.5 [PH] (ref 5–9)
PHOSPHATE SERPL-MCNC: 2.6 MG/DL (ref 2.5–4.5)
PLATELET # BLD AUTO: 147 K/UL (ref 130–450)
PMV BLD AUTO: 12.1 FL (ref 7–12)
POTASSIUM SERPL-SCNC: 4.9 MMOL/L (ref 3.5–5)
PROT SERPL-MCNC: 7.1 G/DL (ref 6.4–8.3)
PROT UR STRIP-MCNC: ABNORMAL MG/DL
PTH-INTACT SERPL-MCNC: 146.7 PG/ML (ref 15–65)
RBC # BLD AUTO: 4 M/UL (ref 3.5–5.5)
RBC #/AREA URNS HPF: ABNORMAL /HPF
SODIUM SERPL-SCNC: 137 MMOL/L (ref 132–146)
SP GR UR STRIP: 1.02 (ref 1–1.03)
TIBC SERPL-MCNC: 298 UG/DL (ref 250–450)
TOTAL PROTEIN, URINE: 20 MG/DL (ref 0–12)
URATE SERPL-MCNC: 11.2 MG/DL (ref 2.4–5.7)
URINE TOTAL PROTEIN CREATININE RATIO: 0.18 (ref 0–0.2)
UROBILINOGEN UR STRIP-ACNC: 0.2 EU/DL (ref 0–1)
WBC #/AREA URNS HPF: ABNORMAL /HPF
WBC OTHER # BLD: 9.5 K/UL (ref 4.5–11.5)

## 2024-12-30 PROCEDURE — 82570 ASSAY OF URINE CREATININE: CPT

## 2024-12-30 PROCEDURE — 82306 VITAMIN D 25 HYDROXY: CPT

## 2024-12-30 PROCEDURE — 83540 ASSAY OF IRON: CPT

## 2024-12-30 PROCEDURE — 82728 ASSAY OF FERRITIN: CPT

## 2024-12-30 PROCEDURE — 83970 ASSAY OF PARATHORMONE: CPT

## 2024-12-30 PROCEDURE — 36415 COLL VENOUS BLD VENIPUNCTURE: CPT

## 2024-12-30 PROCEDURE — 83735 ASSAY OF MAGNESIUM: CPT

## 2024-12-30 PROCEDURE — 83550 IRON BINDING TEST: CPT

## 2024-12-30 PROCEDURE — 83880 ASSAY OF NATRIURETIC PEPTIDE: CPT

## 2024-12-30 PROCEDURE — 85025 COMPLETE CBC W/AUTO DIFF WBC: CPT

## 2024-12-30 PROCEDURE — 81001 URINALYSIS AUTO W/SCOPE: CPT

## 2024-12-30 PROCEDURE — 84550 ASSAY OF BLOOD/URIC ACID: CPT

## 2024-12-30 PROCEDURE — 99214 OFFICE O/P EST MOD 30 MIN: CPT

## 2024-12-30 PROCEDURE — 84100 ASSAY OF PHOSPHORUS: CPT

## 2024-12-30 PROCEDURE — 84156 ASSAY OF PROTEIN URINE: CPT

## 2024-12-30 PROCEDURE — 80053 COMPREHEN METABOLIC PANEL: CPT

## 2024-12-30 NOTE — PROGRESS NOTES
Congestive Heart Failure Clinic   [facility]     Referring Provider: Dr. Renteria  Primary Care Physician: Brennon Malave DO   Cardiologist: Dr. Nash  Nephrologist: Dr. Latham   Transplant: Dr. Terrance Membreno ( standing order for blood work q 3 months.)     HISTORY OF PRESENT ILLNESS:     Natali Wolff is a 69 y.o. (1955) female with a history of HFpEF (EF> 50%), most recent EF:  Lab Results   Component Value Date    LVEF 55 07/24/2023    LVEFMODE Echo 07/24/2023     She presents to the CHF clinic for ongoing evaluation and monitoring of heart failure.    In the CHF clinic today she denies any adverse symptoms except:  [] Dizziness or lightheadedness   [] Syncope or near syncope  [] Recent Fall  [] Shortness of breath at rest   [] Dyspnea with exertion  [] Decline in functional capacity (unable to perform activities they had previously been able to do)  [] Fatigue   [] Orthopnea  [] PND  [] Nocturnal cough  [] Hemoptysis  [] Chest pain, pressure, or discomfort  [] Palpitations   [] Abdominal distention  [] Abdominal bloating  [] Early satiety  [] Blood in stool   [] Diarrhea  [] Constipation  [] Nausea/Vomiting  [] Decreased urinary response to oral diuretic   [] Scrotal swelling   [x] Lower extremity edema RIGHT >LEFT  [] Used PRN doses of oral diuretic    Weight gain-yes 6 pounds in one month    Wt Readings from Last 3 Encounters:   12/30/24 93.9 kg (207 lb)   12/17/24 94.8 kg (209 lb)   11/18/24 91.2 kg (201 lb)       SOCIAL HISTORY:  [x] Denies tobacco, alcohol or illicit drug abuse  [] Tobacco use:  [] ETOH use:  [] Illicit drug use:        MEDICATIONS:    Allergies   Allergen Reactions    Cipro [Ciprofloxacin Hcl]     Macrodantin [Nitrofurantoin Macrocrystal] Nausea Only    Sulfa Antibiotics Nausea Only     Prior to Visit Medications    Medication Sig Taking? Authorizing Provider   cefdinir (OMNICEF) 300 MG capsule Take 1 capsule by mouth daily

## 2025-01-06 ENCOUNTER — TELEPHONE (OUTPATIENT)
Dept: OTHER | Age: 70
End: 2025-01-06

## 2025-01-06 RX ORDER — BUMETANIDE 1 MG/1
1 TABLET ORAL DAILY
Qty: 90 TABLET | Refills: 3 | Status: SHIPPED | OUTPATIENT
Start: 2025-01-06

## 2025-01-06 NOTE — TELEPHONE ENCOUNTER
Patient called into the CHF clinic asking for an updated prescription of Bumex because she has been taking it daily instead of three times weekly per Dr. Membreno. [facility] will not fill her prescription for her. Note will be routed to Brooklynn MOREIRA per the patients request.

## 2025-01-09 ENCOUNTER — TRANSCRIBE ORDERS (OUTPATIENT)
Dept: ADMINISTRATIVE | Age: 70
End: 2025-01-09

## 2025-01-09 DIAGNOSIS — E83.52 HYPERCALCEMIA: ICD-10-CM

## 2025-01-09 DIAGNOSIS — E21.0 PRIMARY HYPERPARATHYROIDISM (HCC): Primary | ICD-10-CM

## 2025-01-15 ENCOUNTER — TELEPHONE (OUTPATIENT)
Dept: INTERNAL MEDICINE CLINIC | Age: 70
End: 2025-01-15

## 2025-01-15 NOTE — TELEPHONE ENCOUNTER
Patient's Phuysical Therapist is suggesting patient use a Tens unit.      She is asking if you are agreeable to her using one.

## 2025-01-16 ENCOUNTER — HOSPITAL ENCOUNTER (OUTPATIENT)
Age: 70
Discharge: HOME | End: 2025-01-16
Payer: MEDICARE

## 2025-01-16 ENCOUNTER — HOSPITAL ENCOUNTER (OUTPATIENT)
Dept: OTHER | Age: 70
Setting detail: THERAPIES SERIES
Discharge: HOME | End: 2025-01-16
Payer: MEDICARE

## 2025-01-16 VITALS
WEIGHT: 208.01 LBS | BODY MASS INDEX: 38.05 KG/M2 | DIASTOLIC BLOOD PRESSURE: 66 MMHG | OXYGEN SATURATION: 93 % | RESPIRATION RATE: 18 BRPM | SYSTOLIC BLOOD PRESSURE: 138 MMHG | HEART RATE: 69 BPM

## 2025-01-16 LAB
ALBUMIN: 4.5 G/DL (ref 3.5–5.2)
ANION GAP SERPL CALCULATED.3IONS-SCNC: 15 MMOL/L (ref 7–16)
BASOPHILS # BLD: 0.05 K/UL (ref 0–0.2)
BASOPHILS NFR BLD: 0 % (ref 0–2)
BNP SERPL-MCNC: 1636 PG/ML (ref 0–450)
BUN SERPL-MCNC: 37 MG/DL (ref 6–23)
CALCIUM SERPL-MCNC: 10.4 MG/DL (ref 8.6–10.2)
CHLORIDE SERPL-SCNC: 101 MMOL/L (ref 98–107)
CO2 SERPL-SCNC: 25 MMOL/L (ref 22–29)
CREAT SERPL-MCNC: 1.8 MG/DL (ref 0.5–1)
EOSINOPHIL # BLD: 0.07 K/UL (ref 0.05–0.5)
EOSINOPHILS RELATIVE PERCENT: 1 % (ref 0–6)
ERYTHROCYTE [DISTWIDTH] IN BLOOD BY AUTOMATED COUNT: 14 % (ref 11.5–15)
GFR, ESTIMATED: 30 ML/MIN/1.73M2
GLUCOSE SERPL-MCNC: 135 MG/DL (ref 74–99)
HCT VFR BLD AUTO: 43 % (ref 34–48)
HGB BLD-MCNC: 13 G/DL (ref 11.5–15.5)
IMM GRANULOCYTES # BLD AUTO: 0.04 K/UL (ref 0–0.58)
IMM GRANULOCYTES NFR BLD: 0 % (ref 0–5)
LYMPHOCYTES NFR BLD: 0.99 K/UL (ref 1.5–4)
LYMPHOCYTES RELATIVE PERCENT: 9 % (ref 20–42)
MAGNESIUM SERPL-MCNC: 1.9 MG/DL (ref 1.6–2.6)
MCH RBC QN AUTO: 31.3 PG (ref 26–35)
MCHC RBC AUTO-ENTMCNC: 30.2 G/DL (ref 32–34.5)
MCV RBC AUTO: 103.6 FL (ref 80–99.9)
MONOCYTES NFR BLD: 0.74 K/UL (ref 0.1–0.95)
MONOCYTES NFR BLD: 7 % (ref 2–12)
NEUTROPHILS NFR BLD: 83 % (ref 43–80)
NEUTS SEG NFR BLD: 9.26 K/UL (ref 1.8–7.3)
PHOSPHATE SERPL-MCNC: 3.3 MG/DL (ref 2.5–4.5)
PLATELET # BLD AUTO: 142 K/UL (ref 130–450)
PMV BLD AUTO: 13 FL (ref 7–12)
POTASSIUM SERPL-SCNC: 4.9 MMOL/L (ref 3.5–5)
RBC # BLD AUTO: 4.15 M/UL (ref 3.5–5.5)
SODIUM SERPL-SCNC: 141 MMOL/L (ref 132–146)
TOTAL PROTEIN, URINE: 20 MG/DL (ref 0–12)
WBC OTHER # BLD: 11.2 K/UL (ref 4.5–11.5)

## 2025-01-16 PROCEDURE — 83735 ASSAY OF MAGNESIUM: CPT

## 2025-01-16 PROCEDURE — 85025 COMPLETE CBC W/AUTO DIFF WBC: CPT

## 2025-01-16 PROCEDURE — 99214 OFFICE O/P EST MOD 30 MIN: CPT

## 2025-01-16 PROCEDURE — 83880 ASSAY OF NATRIURETIC PEPTIDE: CPT

## 2025-01-16 PROCEDURE — 36415 COLL VENOUS BLD VENIPUNCTURE: CPT

## 2025-01-16 PROCEDURE — 80069 RENAL FUNCTION PANEL: CPT

## 2025-01-16 PROCEDURE — 80158 DRUG ASSAY CYCLOSPORINE: CPT

## 2025-01-16 PROCEDURE — 84156 ASSAY OF PROTEIN URINE: CPT

## 2025-01-16 RX ORDER — CINACALCET 30 MG/1
30 TABLET, FILM COATED ORAL DAILY
COMMUNITY

## 2025-01-16 ASSESSMENT — PATIENT HEALTH QUESTIONNAIRE - PHQ9
1. LITTLE INTEREST OR PLEASURE IN DOING THINGS: SEVERAL DAYS
SUM OF ALL RESPONSES TO PHQ QUESTIONS 1-9: 2
SUM OF ALL RESPONSES TO PHQ9 QUESTIONS 1 & 2: 2
SUM OF ALL RESPONSES TO PHQ QUESTIONS 1-9: 2
2. FEELING DOWN, DEPRESSED OR HOPELESS: SEVERAL DAYS
SUM OF ALL RESPONSES TO PHQ QUESTIONS 1-9: 2
SUM OF ALL RESPONSES TO PHQ QUESTIONS 1-9: 2

## 2025-01-16 NOTE — PROGRESS NOTES
Congestive Heart Failure Clinic   [facility]     Referring Provider: Dr. Renteria  Primary Care Physician: Brennon Malave DO   Cardiologist: Dr. Nash  Nephrologist: Dr. Latham   Transplant: Dr. Terrance Membreno ( standing order for blood work q 3 months.)     HISTORY OF PRESENT ILLNESS:     Natali Wolff is a 69 y.o. (1955) female with a history of HFpEF (EF> 50%), most recent EF:  Lab Results   Component Value Date    LVEF 55 07/24/2023    LVEFMODE Echo 07/24/2023     She presents to the CHF clinic for ongoing evaluation and monitoring of heart failure.    In the CHF clinic today she denies any adverse symptoms except:  [] Dizziness or lightheadedness   [] Syncope or near syncope  [] Recent Fall  [] Shortness of breath at rest   [] Dyspnea with exertion  [] Decline in functional capacity (unable to perform activities they had previously been able to do)  [] Fatigue   [] Orthopnea  [] PND  [] Nocturnal cough  [] Hemoptysis  [] Chest pain, pressure, or discomfort  [] Palpitations   [] Abdominal distention  [] Abdominal bloating  [] Early satiety  [] Blood in stool   [] Diarrhea  [] Constipation  [] Nausea/Vomiting  [] Decreased urinary response to oral diuretic   [] Scrotal swelling   [x] Lower extremity edema RIGHT >LEFT  [] Used PRN doses of oral diuretic    Weight gain    Wt Readings from Last 3 Encounters:   01/16/25 94.4 kg (208 lb 0.2 oz)   12/30/24 93.9 kg (207 lb)   12/17/24 94.8 kg (209 lb)       SOCIAL HISTORY:  [x] Denies tobacco, alcohol or illicit drug abuse  [] Tobacco use:  [] ETOH use:  [] Illicit drug use:        MEDICATIONS:    Allergies   Allergen Reactions    Cipro [Ciprofloxacin Hcl]     Macrodantin [Nitrofurantoin Macrocrystal] Nausea Only    Sulfa Antibiotics Nausea Only     Prior to Visit Medications    Medication Sig Taking? Authorizing Provider   cinacalcet (SENSIPAR) 30 MG tablet Take 1 tablet by mouth daily Yes Provider,

## 2025-01-22 LAB — CYCLOSPORINE BLD-MCNC: 193 UG/L (ref 50–300)

## 2025-01-27 ENCOUNTER — DOCUMENTATION (OUTPATIENT)
Facility: HOSPITAL | Age: 70
End: 2025-01-27
Payer: MEDICARE

## 2025-02-01 NOTE — PLAN OF CARE

## 2025-02-11 ENCOUNTER — TELEPHONE (OUTPATIENT)
Dept: CARDIOLOGY CLINIC | Age: 70
End: 2025-02-11

## 2025-02-14 ENCOUNTER — OFFICE VISIT (OUTPATIENT)
Dept: CARDIOLOGY CLINIC | Age: 70
End: 2025-02-14

## 2025-02-14 VITALS
RESPIRATION RATE: 18 BRPM | SYSTOLIC BLOOD PRESSURE: 128 MMHG | BODY MASS INDEX: 35.88 KG/M2 | HEIGHT: 62 IN | HEART RATE: 81 BPM | DIASTOLIC BLOOD PRESSURE: 64 MMHG | WEIGHT: 195 LBS

## 2025-02-14 DIAGNOSIS — I44.7 LBBB (LEFT BUNDLE BRANCH BLOCK): Primary | ICD-10-CM

## 2025-02-14 DIAGNOSIS — I73.9 PAD (PERIPHERAL ARTERY DISEASE): ICD-10-CM

## 2025-02-14 DIAGNOSIS — I50.32 CHRONIC HEART FAILURE WITH PRESERVED EJECTION FRACTION (HFPEF) (HCC): ICD-10-CM

## 2025-02-14 DIAGNOSIS — Z86.79 HISTORY OF CARDIOMYOPATHY: ICD-10-CM

## 2025-02-14 DIAGNOSIS — I10 PRIMARY HYPERTENSION: ICD-10-CM

## 2025-02-14 RX ORDER — OXYCODONE AND ACETAMINOPHEN 5; 325 MG/1; MG/1
1 TABLET ORAL EVERY 4 HOURS PRN
COMMUNITY

## 2025-02-14 RX ORDER — METOPROLOL TARTRATE 25 MG/1
TABLET, FILM COATED ORAL
Qty: 90 TABLET | Refills: 1 | Status: SHIPPED | OUTPATIENT
Start: 2025-02-14

## 2025-02-14 NOTE — PROGRESS NOTES
discussed.     Refills  -     metoprolol tartrate (LOPRESSOR) 25 MG tablet; 0.5mg bid                Above recommendations discussed with her and her cousin, Lisa  Current medications, allergies, and results of prior tests (as available) were reviewed.   All questions answered about cardiac diagnoses and cardiac medications.   Continue current medications. Monitor BP and heart rates.              Compliance with medications and f/u with physicians discussed.   Risk factor modification based on risk profile discussed.   Advised to call for exertional chest pains, short of breath; dizzy or palpitations.   Follow up in 6 months or earlier if needed.         [facility] Cardiology  [address] [place] [address]  [phone]

## 2025-02-17 ENCOUNTER — HOSPITAL ENCOUNTER (EMERGENCY)
Age: 70
Discharge: HOME | End: 2025-02-17
Attending: EMERGENCY MEDICINE
Payer: MEDICARE

## 2025-02-17 ENCOUNTER — APPOINTMENT (OUTPATIENT)
Dept: ULTRASOUND IMAGING | Age: 70
End: 2025-02-17
Payer: MEDICARE

## 2025-02-17 ENCOUNTER — APPOINTMENT (OUTPATIENT)
Dept: MRI IMAGING | Age: 70
End: 2025-02-17
Payer: MEDICARE

## 2025-02-17 VITALS
TEMPERATURE: 98.1 F | OXYGEN SATURATION: 94 % | DIASTOLIC BLOOD PRESSURE: 96 MMHG | SYSTOLIC BLOOD PRESSURE: 147 MMHG | WEIGHT: 195 LBS | HEIGHT: 62 IN | BODY MASS INDEX: 35.88 KG/M2 | HEART RATE: 74 BPM | RESPIRATION RATE: 20 BRPM

## 2025-02-17 DIAGNOSIS — G89.29 CHRONIC RIGHT-SIDED LOW BACK PAIN WITH RIGHT-SIDED SCIATICA: Primary | ICD-10-CM

## 2025-02-17 DIAGNOSIS — M54.41 CHRONIC RIGHT-SIDED LOW BACK PAIN WITH RIGHT-SIDED SCIATICA: Primary | ICD-10-CM

## 2025-02-17 PROCEDURE — 93971 EXTREMITY STUDY: CPT

## 2025-02-17 PROCEDURE — 72148 MRI LUMBAR SPINE W/O DYE: CPT

## 2025-02-17 PROCEDURE — 99284 EMERGENCY DEPT VISIT MOD MDM: CPT

## 2025-02-17 PROCEDURE — 96372 THER/PROPH/DIAG INJ SC/IM: CPT

## 2025-02-17 PROCEDURE — 6360000002 HC RX W HCPCS: Performed by: EMERGENCY MEDICINE

## 2025-02-17 RX ORDER — METHYLPREDNISOLONE 4 MG/1
TABLET ORAL
Qty: 1 KIT | Refills: 0 | Status: SHIPPED | OUTPATIENT
Start: 2025-02-17 | End: 2025-02-23

## 2025-02-17 RX ORDER — DEXAMETHASONE SODIUM PHOSPHATE 10 MG/ML
10 INJECTION, SOLUTION INTRA-ARTICULAR; INTRALESIONAL; INTRAMUSCULAR; INTRAVENOUS; SOFT TISSUE ONCE
Status: COMPLETED | OUTPATIENT
Start: 2025-02-17 | End: 2025-02-17

## 2025-02-17 RX ORDER — MORPHINE SULFATE 10 MG/ML
6 INJECTION, SOLUTION INTRAMUSCULAR; INTRAVENOUS ONCE
Status: COMPLETED | OUTPATIENT
Start: 2025-02-17 | End: 2025-02-17

## 2025-02-17 RX ADMIN — DEXAMETHASONE SODIUM PHOSPHATE 10 MG: 10 INJECTION INTRAMUSCULAR; INTRAVENOUS at 13:06

## 2025-02-17 RX ADMIN — MORPHINE SULFATE 6 MG: 10 INJECTION, SOLUTION INTRAMUSCULAR; INTRAVENOUS at 13:06

## 2025-02-17 ASSESSMENT — ENCOUNTER SYMPTOMS
WHEEZING: 0
SORE THROAT: 0
ABDOMINAL PAIN: 0
NAUSEA: 0
VOMITING: 0
SHORTNESS OF BREATH: 0
DIARRHEA: 0
CHEST TIGHTNESS: 0
BACK PAIN: 1
COUGH: 0

## 2025-02-17 ASSESSMENT — PAIN DESCRIPTION - ORIENTATION: ORIENTATION: LOWER;MID

## 2025-02-17 ASSESSMENT — PAIN - FUNCTIONAL ASSESSMENT: PAIN_FUNCTIONAL_ASSESSMENT: 0-10

## 2025-02-17 ASSESSMENT — PAIN DESCRIPTION - LOCATION
LOCATION: BACK
LOCATION: BACK

## 2025-02-17 ASSESSMENT — PAIN SCALES - GENERAL
PAINLEVEL_OUTOF10: 8
PAINLEVEL_OUTOF10: 8

## 2025-02-17 NOTE — ED PROVIDER NOTES
history. She has never used smokeless tobacco. She reports that she does not drink alcohol and does not use drugs.    Family History: family history includes Breast Cancer in her maternal grandmother and sister; Dementia in her father; Diabetes in her father and mother.     The patient’s home medications have been reviewed.    Allergies: Cipro [ciprofloxacin hcl], Macrodantin [nitrofurantoin macrocrystal], and Sulfa antibiotics    -------------------------------------------------- RESULTS -------------------------------------------------  Labs:  No results found for this visit on 02/17/25.    Radiology:  MRI LUMBAR SPINE WO CONTRAST   Final Result   1. No change in severe spinal stenosis at L2-L3.  There are changes of spinal   block, with tangling of the nerve roots of the cauda equina superior to this   level.   2. Stable severe spinal stenosis at L4-L5.   3. Stable mild spinal stenosis at L1-L2 and L3-L4.   4. Stable moderate bilateral foraminal zone disc bulging at L4-L5 coming into   contact with the exiting L4 nerve roots without compression of the nerve   roots.   5. Stable moderate bilateral renal atrophy with numerous cortical cysts   consistent with autosomal dominant polycystic kidney disease superimposed   upon chronic renal insufficiency.   6. Solid 1.3 x 1.1 cm nodule projecting posteriorly from the upper pole of   the right kidney, corresponding to a high density region on CT. This is   suspicious for a renal cell carcinoma.      RECOMMENDATIONS:   If clinically appropriate for the patient's age, further evaluation of the   right upper pole renal mass could be performed with a renal mass protocol CT   or MRI of the abdomen without and with intravenous contrast.         Vascular duplex lower extremity venous right   Final Result   No evidence of DVT in the right lower extremity.             ------------------------- NURSING NOTES AND VITALS REVIEWED ---------------------------  Date / Time Roomed:

## 2025-02-19 ENCOUNTER — HOSPITAL ENCOUNTER (OUTPATIENT)
Age: 70
Discharge: HOME | End: 2025-02-19
Payer: MEDICARE

## 2025-02-19 ENCOUNTER — HOSPITAL ENCOUNTER (OUTPATIENT)
Age: 70
Discharge: HOME | End: 2025-02-21
Payer: MEDICARE

## 2025-02-19 LAB
ALBUMIN: 4.1 G/DL (ref 3.5–5.2)
ANION GAP SERPL CALCULATED.3IONS-SCNC: 12 MMOL/L (ref 7–16)
BASOPHILS # BLD: 0.03 K/UL (ref 0–0.2)
BASOPHILS NFR BLD: 0 % (ref 0–2)
BUN SERPL-MCNC: 36 MG/DL (ref 6–23)
CALCIUM SERPL-MCNC: 10.3 MG/DL (ref 8.6–10.2)
CHLORIDE SERPL-SCNC: 98 MMOL/L (ref 98–107)
CO2 SERPL-SCNC: 26 MMOL/L (ref 22–29)
CREAT SERPL-MCNC: 1.6 MG/DL (ref 0.5–1)
CREAT UR-MCNC: 94 MG/DL (ref 29–226)
EOSINOPHIL # BLD: 0.01 K/UL (ref 0.05–0.5)
EOSINOPHILS RELATIVE PERCENT: 0 % (ref 0–6)
ERYTHROCYTE [DISTWIDTH] IN BLOOD BY AUTOMATED COUNT: 13.7 % (ref 11.5–15)
GFR, ESTIMATED: 34 ML/MIN/1.73M2
GLUCOSE SERPL-MCNC: 101 MG/DL (ref 74–99)
HCT VFR BLD AUTO: 39.8 % (ref 34–48)
HGB BLD-MCNC: 12.2 G/DL (ref 11.5–15.5)
IMM GRANULOCYTES # BLD AUTO: 0.16 K/UL (ref 0–0.58)
IMM GRANULOCYTES NFR BLD: 1 % (ref 0–5)
LYMPHOCYTES NFR BLD: 0.62 K/UL (ref 1.5–4)
LYMPHOCYTES RELATIVE PERCENT: 4 % (ref 20–42)
MAGNESIUM SERPL-MCNC: 1.9 MG/DL (ref 1.6–2.6)
MCH RBC QN AUTO: 31.3 PG (ref 26–35)
MCHC RBC AUTO-ENTMCNC: 30.7 G/DL (ref 32–34.5)
MCV RBC AUTO: 102.1 FL (ref 80–99.9)
MONOCYTES NFR BLD: 1.08 K/UL (ref 0.1–0.95)
MONOCYTES NFR BLD: 7 % (ref 2–12)
NEUTROPHILS NFR BLD: 88 % (ref 43–80)
NEUTS SEG NFR BLD: 13.61 K/UL (ref 1.8–7.3)
PHOSPHATE SERPL-MCNC: 2.4 MG/DL (ref 2.5–4.5)
PLATELET, FLUORESCENCE: 134 K/UL (ref 130–450)
PMV BLD AUTO: 13.4 FL (ref 7–12)
POTASSIUM SERPL-SCNC: 4.5 MMOL/L (ref 3.5–5)
RBC # BLD AUTO: 3.9 M/UL (ref 3.5–5.5)
SODIUM SERPL-SCNC: 136 MMOL/L (ref 132–146)
TOTAL PROTEIN, URINE: 19 MG/DL (ref 0–12)
URINE TOTAL PROTEIN CREATININE RATIO: 0.2 (ref 0–0.2)
WBC OTHER # BLD: 15.5 K/UL (ref 4.5–11.5)

## 2025-02-19 PROCEDURE — 80158 DRUG ASSAY CYCLOSPORINE: CPT

## 2025-02-19 PROCEDURE — 85025 COMPLETE CBC W/AUTO DIFF WBC: CPT

## 2025-02-19 PROCEDURE — 82570 ASSAY OF URINE CREATININE: CPT

## 2025-02-19 PROCEDURE — 36415 COLL VENOUS BLD VENIPUNCTURE: CPT

## 2025-02-19 PROCEDURE — 84156 ASSAY OF PROTEIN URINE: CPT

## 2025-02-19 PROCEDURE — 80069 RENAL FUNCTION PANEL: CPT

## 2025-02-19 PROCEDURE — 83735 ASSAY OF MAGNESIUM: CPT

## 2025-02-21 LAB — CYCLOSPORINE BLD-MCNC: 163 UG/L (ref 50–300)

## 2025-02-25 ENCOUNTER — DOCUMENTATION (OUTPATIENT)
Facility: HOSPITAL | Age: 70
End: 2025-02-25
Payer: MEDICARE

## 2025-02-25 NOTE — PROGRESS NOTES
Labs reviewed from [facility] date collected on 12-21-24 and 1-23-25. Will give to the AA's to scan into media. Lab jeanne outdated. Will await new labs.

## 2025-02-26 ENCOUNTER — HOSPITAL ENCOUNTER (OUTPATIENT)
Dept: GENERAL RADIOLOGY | Age: 70
Discharge: HOME | End: 2025-02-28
Payer: MEDICARE

## 2025-02-26 ENCOUNTER — OFFICE VISIT (OUTPATIENT)
Dept: TRANSPLANT | Facility: CLINIC | Age: 70
End: 2025-02-26
Payer: MEDICARE

## 2025-02-26 ENCOUNTER — HOSPITAL ENCOUNTER (OUTPATIENT)
Age: 70
Discharge: HOME | End: 2025-02-28
Payer: MEDICARE

## 2025-02-26 VITALS
HEART RATE: 90 BPM | TEMPERATURE: 97.9 F | BODY MASS INDEX: 36.58 KG/M2 | SYSTOLIC BLOOD PRESSURE: 130 MMHG | DIASTOLIC BLOOD PRESSURE: 75 MMHG | WEIGHT: 200 LBS | OXYGEN SATURATION: 99 %

## 2025-02-26 DIAGNOSIS — Z94.0 IMMUNOSUPPRESSIVE MANAGEMENT ENCOUNTER FOLLOWING KIDNEY TRANSPLANT: ICD-10-CM

## 2025-02-26 DIAGNOSIS — M79.671 BILATERAL FOOT PAIN: ICD-10-CM

## 2025-02-26 DIAGNOSIS — M79.672 BILATERAL FOOT PAIN: ICD-10-CM

## 2025-02-26 DIAGNOSIS — E55.9 VITAMIN D DEFICIENCY: ICD-10-CM
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
## 2025-02-26 DIAGNOSIS — Z94.0 KIDNEY REPLACED BY TRANSPLANT (HHS-HCC): Primary | ICD-10-CM

## 2025-02-26 DIAGNOSIS — K59.09 OTHER CONSTIPATION: ICD-10-CM

## 2025-02-26 DIAGNOSIS — E21.3 HYPERPARATHYROIDISM (MULTI): ICD-10-CM

## 2025-02-26 DIAGNOSIS — Z79.899 IMMUNOSUPPRESSIVE MANAGEMENT ENCOUNTER FOLLOWING KIDNEY TRANSPLANT: ICD-10-CM

## 2025-02-26 PROCEDURE — 99214 OFFICE O/P EST MOD 30 MIN: CPT | Performed by: INTERNAL MEDICINE

## 2025-02-26 PROCEDURE — 73630 X-RAY EXAM OF FOOT: CPT

## 2025-02-26 PROCEDURE — 1126F AMNT PAIN NOTED NONE PRSNT: CPT | Performed by: INTERNAL MEDICINE

## 2025-02-26 RX ORDER — LACTULOSE 10 G/15ML
20 SOLUTION ORAL 2 TIMES DAILY PRN
Qty: 500 ML | Refills: 3 | Status: SHIPPED | OUTPATIENT
Start: 2025-02-26

## 2025-02-26 RX ORDER — MYCOPHENOLATE MOFETIL 250 MG/1
750 CAPSULE ORAL 2 TIMES DAILY
Qty: 540 CAPSULE | Refills: 3 | Status: SHIPPED | OUTPATIENT
Start: 2025-02-26 | End: 2026-02-26

## 2025-02-26 RX ORDER — CYCLOSPORINE 25 MG/1
75 CAPSULE, LIQUID FILLED ORAL 2 TIMES DAILY
Qty: 540 CAPSULE | Refills: 3 | Status: SHIPPED | OUTPATIENT
Start: 2025-02-26 | End: 2026-02-26

## 2025-02-26 ASSESSMENT — PAIN SCALES - GENERAL: PAINLEVEL_OUTOF10: 0-NO PAIN

## 2025-02-26 NOTE — PATIENT INSTRUCTIONS
Pt said she didn't well with cinacalcet and she stopped it.  Will recheck parathyroid hormone, calcium and vitamin D levels.  XRAY foot today  Cut back cyclosporine to 75 mg twice a day  Cut back mycophenolate to 750 mg twice a day  Rx for PT extension  Follow up with spine specialist   Blood test 1 week after reducing cyclosporine.   Routine standing lab order every month  Next follow up 3-4 months

## 2025-02-27 ENCOUNTER — HOSPITAL ENCOUNTER (INPATIENT)
Age: 70
LOS: 1 days | Discharge: NURSING, REHAB OR LONG-TERM CARE | DRG: 641 | End: 2025-03-03
Attending: EMERGENCY MEDICINE | Admitting: INTERNAL MEDICINE
Payer: MEDICARE

## 2025-02-27 ENCOUNTER — APPOINTMENT (OUTPATIENT)
Dept: CT IMAGING | Age: 70
DRG: 641 | End: 2025-02-27
Payer: MEDICARE

## 2025-02-27 ENCOUNTER — APPOINTMENT (OUTPATIENT)
Dept: GENERAL RADIOLOGY | Age: 70
DRG: 641 | End: 2025-02-27
Payer: MEDICARE

## 2025-02-27 DIAGNOSIS — R26.2 AMBULATORY DYSFUNCTION: Primary | ICD-10-CM

## 2025-02-27 DIAGNOSIS — G89.29 OTHER CHRONIC PAIN: ICD-10-CM

## 2025-02-27 DIAGNOSIS — M10.9 ACUTE GOUT INVOLVING TOE OF RIGHT FOOT, UNSPECIFIED CAUSE: ICD-10-CM

## 2025-02-27 DIAGNOSIS — R29.6 FREQUENT FALLS: ICD-10-CM

## 2025-02-27 PROBLEM — I48.91 ATRIAL FIBRILLATION (HCC): Status: ACTIVE | Noted: 2025-02-27

## 2025-02-27 PROBLEM — R53.1 GENERALIZED WEAKNESS: Status: ACTIVE | Noted: 2025-02-27

## 2025-02-27 PROBLEM — E78.5 HYPERLIPIDEMIA: Status: ACTIVE | Noted: 2025-02-27

## 2025-02-27 PROBLEM — E03.9 HYPOTHYROIDISM: Status: ACTIVE | Noted: 2025-02-27

## 2025-02-27 PROBLEM — Z78.9 UNABLE TO CARE FOR SELF: Status: ACTIVE | Noted: 2025-02-27

## 2025-02-27 LAB
ALBUMIN SERPL-MCNC: 3.7 G/DL (ref 3.5–5.2)
ALP SERPL-CCNC: 108 U/L (ref 35–104)
ALT SERPL-CCNC: 11 U/L (ref 0–32)
ANION GAP SERPL CALCULATED.3IONS-SCNC: 16 MMOL/L (ref 7–16)
AST SERPL-CCNC: 22 U/L (ref 0–31)
BASOPHILS # BLD: 0 K/UL (ref 0–0.2)
BASOPHILS NFR BLD: 0 % (ref 0–2)
BILIRUB SERPL-MCNC: 1.1 MG/DL (ref 0–1.2)
BUN SERPL-MCNC: 46 MG/DL (ref 6–23)
CALCIUM SERPL-MCNC: 10 MG/DL (ref 8.6–10.2)
CHLORIDE SERPL-SCNC: 96 MMOL/L (ref 98–107)
CK SERPL-CCNC: 353 U/L (ref 20–180)
CO2 SERPL-SCNC: 23 MMOL/L (ref 22–29)
CREAT SERPL-MCNC: 1.9 MG/DL (ref 0.5–1)
EKG ATRIAL RATE: 72 BPM
EKG P AXIS: 49 DEGREES
EKG P-R INTERVAL: 180 MS
EKG Q-T INTERVAL: 442 MS
EKG QRS DURATION: 158 MS
EKG QTC CALCULATION (BAZETT): 483 MS
EKG R AXIS: -40 DEGREES
EKG T AXIS: 146 DEGREES
EKG VENTRICULAR RATE: 72 BPM
EOSINOPHIL # BLD: 0 K/UL (ref 0.05–0.5)
EOSINOPHILS RELATIVE PERCENT: 0 % (ref 0–6)
ERYTHROCYTE [DISTWIDTH] IN BLOOD BY AUTOMATED COUNT: 13.8 % (ref 11.5–15)
GFR, ESTIMATED: 28 ML/MIN/1.73M2
GLUCOSE SERPL-MCNC: 143 MG/DL (ref 74–99)
HCT VFR BLD AUTO: 39.8 % (ref 34–48)
HGB BLD-MCNC: 12.5 G/DL (ref 11.5–15.5)
LYMPHOCYTES NFR BLD: 0.62 K/UL (ref 1.5–4)
LYMPHOCYTES RELATIVE PERCENT: 5 % (ref 20–42)
MCH RBC QN AUTO: 31.3 PG (ref 26–35)
MCHC RBC AUTO-ENTMCNC: 31.4 G/DL (ref 32–34.5)
MCV RBC AUTO: 99.7 FL (ref 80–99.9)
MONOCYTES NFR BLD: 0.62 K/UL (ref 0.1–0.95)
MONOCYTES NFR BLD: 5 % (ref 2–12)
NEUTROPHILS NFR BLD: 90 % (ref 43–80)
NEUTS SEG NFR BLD: 10.57 K/UL (ref 1.8–7.3)
PLATELET, FLUORESCENCE: 124 K/UL (ref 130–450)
PMV BLD AUTO: 13.8 FL (ref 7–12)
POTASSIUM SERPL-SCNC: 4.6 MMOL/L (ref 3.5–5)
PROT SERPL-MCNC: 6.6 G/DL (ref 6.4–8.3)
RBC # BLD AUTO: 3.99 M/UL (ref 3.5–5.5)
RBC # BLD: ABNORMAL 10*6/UL
SODIUM SERPL-SCNC: 135 MMOL/L (ref 132–146)
TROPONIN I SERPL HS-MCNC: 68 NG/L (ref 0–9)
TROPONIN I SERPL HS-MCNC: 69 NG/L (ref 0–9)
WBC OTHER # BLD: 11.8 K/UL (ref 4.5–11.5)

## 2025-02-27 PROCEDURE — G0378 HOSPITAL OBSERVATION PER HR: HCPCS

## 2025-02-27 PROCEDURE — 99285 EMERGENCY DEPT VISIT HI MDM: CPT

## 2025-02-27 PROCEDURE — 6370000000 HC RX 637 (ALT 250 FOR IP): Performed by: INTERNAL MEDICINE

## 2025-02-27 PROCEDURE — 6360000002 HC RX W HCPCS: Performed by: INTERNAL MEDICINE

## 2025-02-27 PROCEDURE — 73590 X-RAY EXAM OF LOWER LEG: CPT

## 2025-02-27 PROCEDURE — 71045 X-RAY EXAM CHEST 1 VIEW: CPT

## 2025-02-27 PROCEDURE — 70450 CT HEAD/BRAIN W/O DYE: CPT

## 2025-02-27 PROCEDURE — 99223 1ST HOSP IP/OBS HIGH 75: CPT | Performed by: INTERNAL MEDICINE

## 2025-02-27 PROCEDURE — 72125 CT NECK SPINE W/O DYE: CPT

## 2025-02-27 PROCEDURE — 72170 X-RAY EXAM OF PELVIS: CPT

## 2025-02-27 PROCEDURE — 80053 COMPREHEN METABOLIC PANEL: CPT

## 2025-02-27 PROCEDURE — 93010 ELECTROCARDIOGRAM REPORT: CPT | Performed by: INTERNAL MEDICINE

## 2025-02-27 PROCEDURE — 2500000003 HC RX 250 WO HCPCS: Performed by: INTERNAL MEDICINE

## 2025-02-27 PROCEDURE — 82550 ASSAY OF CK (CPK): CPT

## 2025-02-27 PROCEDURE — 97161 PT EVAL LOW COMPLEX 20 MIN: CPT | Performed by: PHYSICAL THERAPIST

## 2025-02-27 PROCEDURE — 84484 ASSAY OF TROPONIN QUANT: CPT

## 2025-02-27 PROCEDURE — 6370000000 HC RX 637 (ALT 250 FOR IP): Performed by: EMERGENCY MEDICINE

## 2025-02-27 PROCEDURE — 97165 OT EVAL LOW COMPLEX 30 MIN: CPT

## 2025-02-27 PROCEDURE — 93005 ELECTROCARDIOGRAM TRACING: CPT | Performed by: EMERGENCY MEDICINE

## 2025-02-27 PROCEDURE — 85025 COMPLETE CBC W/AUTO DIFF WBC: CPT

## 2025-02-27 RX ORDER — ACETAMINOPHEN 325 MG/1
650 TABLET ORAL EVERY 6 HOURS PRN
Status: DISCONTINUED | OUTPATIENT
Start: 2025-02-27 | End: 2025-03-03 | Stop reason: HOSPADM

## 2025-02-27 RX ORDER — CLOZAPINE 25 MG/1
25 TABLET ORAL DAILY
Status: DISCONTINUED | OUTPATIENT
Start: 2025-02-27 | End: 2025-03-03 | Stop reason: HOSPADM

## 2025-02-27 RX ORDER — SODIUM CHLORIDE 0.9 % (FLUSH) 0.9 %
5-40 SYRINGE (ML) INJECTION PRN
Status: DISCONTINUED | OUTPATIENT
Start: 2025-02-27 | End: 2025-03-03 | Stop reason: HOSPADM

## 2025-02-27 RX ORDER — SODIUM CHLORIDE 9 MG/ML
INJECTION, SOLUTION INTRAVENOUS PRN
Status: DISCONTINUED | OUTPATIENT
Start: 2025-02-27 | End: 2025-03-03 | Stop reason: HOSPADM

## 2025-02-27 RX ORDER — MYCOPHENOLATE MOFETIL 250 MG/1
1000 CAPSULE ORAL 2 TIMES DAILY
Status: DISCONTINUED | OUTPATIENT
Start: 2025-02-27 | End: 2025-03-03 | Stop reason: HOSPADM

## 2025-02-27 RX ORDER — VENLAFAXINE 25 MG/1
50 TABLET ORAL 3 TIMES DAILY
Status: DISCONTINUED | OUTPATIENT
Start: 2025-02-27 | End: 2025-03-03 | Stop reason: HOSPADM

## 2025-02-27 RX ORDER — ACETAMINOPHEN 650 MG/1
650 SUPPOSITORY RECTAL EVERY 6 HOURS PRN
Status: DISCONTINUED | OUTPATIENT
Start: 2025-02-27 | End: 2025-03-03 | Stop reason: HOSPADM

## 2025-02-27 RX ORDER — MYCOPHENOLATE MOFETIL 500 MG/1
1000 TABLET ORAL 2 TIMES DAILY
Status: DISCONTINUED | OUTPATIENT
Start: 2025-02-27 | End: 2025-02-27 | Stop reason: RX

## 2025-02-27 RX ORDER — FERROUS SULFATE 325(65) MG
325 TABLET ORAL
Status: DISCONTINUED | OUTPATIENT
Start: 2025-02-28 | End: 2025-03-03 | Stop reason: HOSPADM

## 2025-02-27 RX ORDER — POLYETHYLENE GLYCOL 3350 17 G/17G
17 POWDER, FOR SOLUTION ORAL DAILY PRN
Status: DISCONTINUED | OUTPATIENT
Start: 2025-02-27 | End: 2025-03-03 | Stop reason: HOSPADM

## 2025-02-27 RX ORDER — LEVOTHYROXINE SODIUM 50 UG/1
50 TABLET ORAL DAILY
Status: DISCONTINUED | OUTPATIENT
Start: 2025-02-28 | End: 2025-03-03 | Stop reason: HOSPADM

## 2025-02-27 RX ORDER — FOLIC ACID 1 MG/1
1 TABLET ORAL DAILY
Status: DISCONTINUED | OUTPATIENT
Start: 2025-02-27 | End: 2025-03-03 | Stop reason: HOSPADM

## 2025-02-27 RX ORDER — ONDANSETRON 2 MG/ML
4 INJECTION INTRAMUSCULAR; INTRAVENOUS EVERY 6 HOURS PRN
Status: DISCONTINUED | OUTPATIENT
Start: 2025-02-27 | End: 2025-03-03 | Stop reason: HOSPADM

## 2025-02-27 RX ORDER — ATORVASTATIN CALCIUM 20 MG/1
20 TABLET, FILM COATED ORAL NIGHTLY
Status: DISCONTINUED | OUTPATIENT
Start: 2025-02-27 | End: 2025-03-03 | Stop reason: HOSPADM

## 2025-02-27 RX ORDER — CYCLOSPORINE 25 MG/1
100 CAPSULE, GELATIN COATED ORAL 2 TIMES DAILY
Status: DISCONTINUED | OUTPATIENT
Start: 2025-02-27 | End: 2025-03-03 | Stop reason: HOSPADM

## 2025-02-27 RX ORDER — ONDANSETRON 4 MG/1
4 TABLET, ORALLY DISINTEGRATING ORAL EVERY 8 HOURS PRN
Status: DISCONTINUED | OUTPATIENT
Start: 2025-02-27 | End: 2025-03-03 | Stop reason: HOSPADM

## 2025-02-27 RX ORDER — SODIUM CHLORIDE 0.9 % (FLUSH) 0.9 %
5-40 SYRINGE (ML) INJECTION EVERY 12 HOURS SCHEDULED
Status: DISCONTINUED | OUTPATIENT
Start: 2025-02-27 | End: 2025-03-03 | Stop reason: HOSPADM

## 2025-02-27 RX ORDER — CEFDINIR 300 MG/1
300 CAPSULE ORAL DAILY
Status: DISCONTINUED | OUTPATIENT
Start: 2025-02-27 | End: 2025-03-03 | Stop reason: HOSPADM

## 2025-02-27 RX ORDER — PREDNISONE 5 MG/1
5 TABLET ORAL DAILY
Status: DISCONTINUED | OUTPATIENT
Start: 2025-02-27 | End: 2025-03-03 | Stop reason: HOSPADM

## 2025-02-27 RX ORDER — OXYCODONE AND ACETAMINOPHEN 5; 325 MG/1; MG/1
1 TABLET ORAL EVERY 4 HOURS PRN
Status: DISCONTINUED | OUTPATIENT
Start: 2025-02-27 | End: 2025-03-03 | Stop reason: HOSPADM

## 2025-02-27 RX ORDER — METOPROLOL TARTRATE 25 MG/1
12.5 TABLET, FILM COATED ORAL 2 TIMES DAILY
Status: DISCONTINUED | OUTPATIENT
Start: 2025-02-27 | End: 2025-03-03 | Stop reason: HOSPADM

## 2025-02-27 RX ORDER — ACETAMINOPHEN 325 MG/1
650 TABLET ORAL ONCE
Status: COMPLETED | OUTPATIENT
Start: 2025-02-27 | End: 2025-02-27

## 2025-02-27 RX ADMIN — FOLIC ACID 1 MG: 1 TABLET ORAL at 20:01

## 2025-02-27 RX ADMIN — CEFDINIR 300 MG: 300 CAPSULE ORAL at 20:00

## 2025-02-27 RX ADMIN — CLOZAPINE 25 MG: 25 TABLET ORAL at 23:09

## 2025-02-27 RX ADMIN — CYCLOSPORINE 100 MG: 25 CAPSULE, GELATIN COATED ORAL at 23:08

## 2025-02-27 RX ADMIN — VENLAFAXINE HYDROCHLORIDE 50 MG: 25 TABLET ORAL at 23:09

## 2025-02-27 RX ADMIN — APIXABAN 5 MG: 5 TABLET, FILM COATED ORAL at 22:29

## 2025-02-27 RX ADMIN — MYCOPHENOLATE MOFETIL 1000 MG: 250 CAPSULE ORAL at 23:08

## 2025-02-27 RX ADMIN — METOPROLOL TARTRATE 12.5 MG: 25 TABLET, FILM COATED ORAL at 22:30

## 2025-02-27 RX ADMIN — ATORVASTATIN CALCIUM 20 MG: 20 TABLET, FILM COATED ORAL at 22:30

## 2025-02-27 RX ADMIN — ACETAMINOPHEN 650 MG: 325 TABLET ORAL at 13:27

## 2025-02-27 RX ADMIN — SODIUM CHLORIDE, PRESERVATIVE FREE 10 ML: 5 INJECTION INTRAVENOUS at 23:13

## 2025-02-27 RX ADMIN — PREDNISONE 5 MG: 5 TABLET ORAL at 20:01

## 2025-02-27 ASSESSMENT — PAIN DESCRIPTION - LOCATION: LOCATION: FOOT

## 2025-02-27 ASSESSMENT — PAIN SCALES - GENERAL: PAINLEVEL_OUTOF10: 10

## 2025-02-27 ASSESSMENT — PAIN DESCRIPTION - ORIENTATION: ORIENTATION: RIGHT;LEFT

## 2025-02-27 NOTE — ED PROVIDER NOTES
[facility] EMERGENCY DEPARTMENT  EMERGENCY DEPARTMENT ENCOUNTER        Pt Name: Natali Wolff  MRN: [id number]  Birthdate 1955  Date of evaluation: 2025  Provider: Lainey Meier DO  PCP: Brennon Malave DO  Note Started: 12:43 PM EST 25    CHIEF COMPLAINT       Chief Complaint   Patient presents with    Fall     Pt complaint of fall from Rolator, pt complaint of increased falls, would like rehab placement        HISTORY OF PRESENT ILLNESS: 1 or more Elements   History From: Patient and cousin    Limitations to history : None    Natali Wolff is a 69 y.o. female who presents with concern for frequent falls.  She has been having worsening weakness, she is at home by herself and her cousin intermittently checks on her, she is fallen several times over the past few days.  She fell out of her rollator today.  She is scattered bruising from different falls as she is on Eliquis, notes that she had x-ray done of her feet yesterday and there were no fractures.  She is not any chest, abdominal, hip pain, no headaches or vision changes, did not hit her head.  No neck pain or stiffness.  No other associated complaints.      EXTERNAL NOTE REVIEW:      On chart review last discussed with transplant team at  yesterday, last cardiology for left bundle branch block on 2025    REVIEW OF SYSTEMS :      Positives and Pertinent negatives as per HPI.     SURGICAL HISTORY     Past Surgical History:   Procedure Laterality Date    CATARACT REMOVAL Right 2023    approx     SECTION  1985    one normal delivery    COLECTOMY N/A 01/15/2022    DIAGNOSTIC  LAPAROSCOPY LYSIS OF ADHESIONS performed by Carl Rodríguez MD at [facility] OR    COLONOSCOPY      DIAGNOSTIC CARDIAC CATH LAB PROCEDURE      normal coronaries and  normal coronaries    DIALYSIS FISTULA CREATION  march and 2012    phase one and two patient will start dialysis when needed    FEMORAL

## 2025-02-27 NOTE — CARE COORDINATION
2/27/25 SS Note: Pt w/fall & needs SNF. VERONIKA noted pt has MSSP (Medicare Shared Savings Plan) and if approved, can go to select SNF for rehab via waiver- no 3 day stay required. SW shared MSSP list w/pt and she/he chooses [facility] then [facility]. OT/PT notified of need for eval. VERONIKA sent SECURE e-mail to PAC team. Referral completed to [facility] & she will call back to confirm can accept today if approved.     PT/OT completed & doc note finished. VERONIKA notified PAC team.    [facility] notes cannot admit today but can tomorrow. VERONIKA called [facility]- No beds till tomorrow or Saturday. Pt to be OBS.   VERONIKA notified PAC team.  Electronically signed by AG Nash on 2/27/2025 at 5:40 PM

## 2025-02-27 NOTE — H&P
[facility] Hospitalist Group   History and Physical      CHIEF COMPLAINT:  falls    History of Present Illness: pt is a 69 y.o. female who presents for recent falls. Pt stated she slid out of her rollator prior to coming to hospital. Pt states she has been feeling weak over the last 7-10 days. Pt notes that she has decreased her fluid intake 2 weeks ago due to decrease her having to urinate. Pt had been working with therapy at home. Pt denies HA, fevers, chills,n/v, abd pain, diarrhea, constipation, melena, hematochezia, change in urination, dysuria, hematuria, or focal weakness/numbness.    REVIEW OF SYSTEMS:    A detailed system review was conducted with patient and is negative unless stated in hpi.        PMH:  Past Medical History:   Diagnosis Date    Abnormal EKG     left bundle branch block    Acute cystitis without hematuria 01/20/2023    Acute cystitis without hematuria 01/20/2023    Acute exacerbation of chronic obstructive pulmonary disease (COPD) ([facility]) 05/19/2022    Acute gout involving toe of right foot 09/03/2020    Acute on chronic combined systolic (congestive) and diastolic (congestive) heart failure (HCC)     asthmatic bronchitis 03/07/2019    Bipolar disorder ([facility]) 11/26/2018    Cancer ([facility])     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    Cerebrovascular disease     Clotting disorder 1985    thrombophlebitis after c section delivery    Depression     15 years followed by dr zuluaga    Hemodialysis patient     History of blood transfusion     History of renal transplant 10/2015     in [place] Dr Oscar Wilkinson    Hyperlipidemia     Hypertension     Hypothyroidism     Hypothyroidism 11/26/2018    Leg swelling 09/03/2020    Leukocytosis 01/10/2022    Lymphedema of both lower extremities 09/03/2020    Other emphysema (HCC) 6/2/2024    Paroxysmal atrial fibrillation (HCC)     Peripheral vascular disease     Renal failure 11/2012    renal transplant    Sepsis ([facility])

## 2025-02-27 NOTE — ACP (ADVANCE CARE PLANNING)
Advance Care Planning   Healthcare Decision Maker:    Primary Decision Maker: Mayur Cheema - Sayda - [phone]    Secondary Decision Maker: Gita Russell - Other Relative - [phone]    Click here to complete Healthcare Decision Makers including selection of the Healthcare Decision Maker Relationship (ie \"Primary\").  Today we documented Decision Maker(s) consistent with Legal Next of Kin hierarchy.   Electronically signed by AG Nash on 2/27/2025 at 5:43 PM

## 2025-02-28 LAB
ALBUMIN (G/DL) IN SER/PLAS EXTERNAL: 4.1 G/DL
ALBUMIN (G/DL) IN SER/PLAS EXTERNAL: 4.5 G/DL
ALBUMIN SERPL-MCNC: 3.5 G/DL (ref 3.5–5.2)
ALP SERPL-CCNC: 96 U/L (ref 35–104)
ALT SERPL-CCNC: 10 U/L (ref 0–32)
ANION GAP IN SER/PLAS EXTERNAL: 15 MMOL/L
ANION GAP IN SER/PLAS EXTERNAL: 16 MMOL/L
ANION GAP SERPL CALCULATED.3IONS-SCNC: 11 MMOL/L (ref 7–16)
AST SERPL-CCNC: 16 U/L (ref 0–31)
BACTERIA URNS QL MICRO: ABNORMAL
BASOPHILS # BLD: 0.02 K/UL (ref 0–0.2)
BASOPHILS NFR BLD: 0 % (ref 0–2)
BILIRUB SERPL-MCNC: 0.9 MG/DL (ref 0–1.2)
BILIRUB UR QL STRIP: NEGATIVE
BUN SERPL-MCNC: 44 MG/DL (ref 6–23)
CALCIUM (MG/DL) IN SER/PLAS EXTERNAL: 10.4 MG/DL
CALCIUM (MG/DL) IN SER/PLAS EXTERNAL: 9.9 MG/DL
CALCIUM SERPL-MCNC: 9.8 MG/DL (ref 8.6–10.2)
CARBON DIOXIDE, TOTAL (MMOL/L) IN SER/PLAS EXTERNAL: 21 MMOL/L
CARBON DIOXIDE, TOTAL (MMOL/L) IN SER/PLAS EXTERNAL: 25 MMOL/L
CHLORIDE (MMOL/L) IN SER/PLAS EXTERNAL: 101 MMOL/L
CHLORIDE (MMOL/L) IN SER/PLAS EXTERNAL: 101 MMOL/L
CHLORIDE SERPL-SCNC: 99 MMOL/L (ref 98–107)
CLARITY UR: CLEAR
CO2 SERPL-SCNC: 28 MMOL/L (ref 22–29)
COLOR UR: YELLOW
CREAT SERPL-MCNC: 2 MG/DL (ref 0.5–1)
CREATININE (MG/DL) IN SER/PLAS EXTERNAL: 1.5 MG/DL
CREATININE (MG/DL) IN SER/PLAS EXTERNAL: 1.8 MG/DL
EOSINOPHIL # BLD: 0.07 K/UL (ref 0.05–0.5)
EOSINOPHILS RELATIVE PERCENT: 1 % (ref 0–6)
EPI CELLS #/AREA URNS HPF: ABNORMAL /HPF
ERYTHROCYTE [DISTWIDTH] IN BLOOD BY AUTOMATED COUNT: 13.9 % (ref 11.5–15)
GFR, ESTIMATED: 27 ML/MIN/1.73M2
GLOMERULAR FILTRATION RATE ML/MIN/1.73 SQ M.PREDICTED EXTERNAL: NORMAL
GLOMERULAR FILTRATION RATE ML/MIN/1.73 SQ M.PREDICTED EXTERNAL: NORMAL
GLUCOSE (MG/DL) IN SER/PLAS EXTERNAL: 135 MG/DL
GLUCOSE (MG/DL) IN SER/PLAS EXTERNAL: 276 MG/DL
GLUCOSE SERPL-MCNC: 119 MG/DL (ref 74–99)
GLUCOSE UR STRIP-MCNC: NEGATIVE MG/DL
HBA1C MFR BLD: 5.4 % (ref 4–5.6)
HCT VFR BLD AUTO: 37.1 % (ref 34–48)
HGB BLD-MCNC: 11.4 G/DL (ref 11.5–15.5)
HGB UR QL STRIP.AUTO: ABNORMAL
IMM GRANULOCYTES # BLD AUTO: 0.07 K/UL (ref 0–0.58)
IMM GRANULOCYTES NFR BLD: 1 % (ref 0–5)
KETONES UR STRIP-MCNC: NEGATIVE MG/DL
LACTATE BLDV-SCNC: 0.7 MMOL/L (ref 0.5–2.2)
LEUKOCYTE ESTERASE UR QL STRIP: ABNORMAL
LYMPHOCYTES NFR BLD: 0.91 K/UL (ref 1.5–4)
LYMPHOCYTES RELATIVE PERCENT: 9 % (ref 20–42)
MAGNESIUM SERPL-MCNC: 2 MG/DL (ref 1.6–2.6)
MCH RBC QN AUTO: 31.1 PG (ref 26–35)
MCHC RBC AUTO-ENTMCNC: 30.7 G/DL (ref 32–34.5)
MCV RBC AUTO: 101.1 FL (ref 80–99.9)
MONOCYTES NFR BLD: 0.94 K/UL (ref 0.1–0.95)
MONOCYTES NFR BLD: 10 % (ref 2–12)
NEUTROPHILS NFR BLD: 80 % (ref 43–80)
NEUTS SEG NFR BLD: 7.91 K/UL (ref 1.8–7.3)
NITRITE UR QL STRIP: NEGATIVE
PH UR STRIP: 5.5 [PH] (ref 5–8)
PHOSPHATE SERPL-MCNC: 3.5 MG/DL (ref 2.5–4.5)
PHOSPHORUS: 3.3
PHOSPHORUS: 3.7
PLATELET, FLUORESCENCE: 119 K/UL (ref 130–450)
PMV BLD AUTO: 13.3 FL (ref 7–12)
POTASSIUM (MMOL/L) IN SER/PLAS EXTERNAL: 4.8 MMOL/L
POTASSIUM (MMOL/L) IN SER/PLAS EXTERNAL: 4.9 MMOL/L
POTASSIUM SERPL-SCNC: 4.1 MMOL/L (ref 3.5–5)
PROT SERPL-MCNC: 6 G/DL (ref 6.4–8.3)
PROT UR STRIP-MCNC: NEGATIVE MG/DL
RBC # BLD AUTO: 3.67 M/UL (ref 3.5–5.5)
RBC #/AREA URNS HPF: ABNORMAL /HPF
SODIUM (MMOL/L) IN SER/PLAS EXTERNAL: 138 MMOL/L
SODIUM (MMOL/L) IN SER/PLAS EXTERNAL: 141 MMOL/L
SODIUM SERPL-SCNC: 138 MMOL/L (ref 132–146)
SP GR UR STRIP: 1.01 (ref 1–1.03)
UREA NITROGEN (MG/DL) IN SER/PLAS EXTERNAL: 36 MG/DL
UREA NITROGEN (MG/DL) IN SER/PLAS EXTERNAL: 37 MG/DL
UROBILINOGEN UR STRIP-ACNC: 0.2 EU/DL (ref 0–1)
WBC #/AREA URNS HPF: ABNORMAL /HPF
WBC OTHER # BLD: 9.9 K/UL (ref 4.5–11.5)

## 2025-02-28 PROCEDURE — 6370000000 HC RX 637 (ALT 250 FOR IP): Performed by: INTERNAL MEDICINE

## 2025-02-28 PROCEDURE — 83036 HEMOGLOBIN GLYCOSYLATED A1C: CPT

## 2025-02-28 PROCEDURE — G0378 HOSPITAL OBSERVATION PER HR: HCPCS

## 2025-02-28 PROCEDURE — 85025 COMPLETE CBC W/AUTO DIFF WBC: CPT

## 2025-02-28 PROCEDURE — 2500000003 HC RX 250 WO HCPCS: Performed by: INTERNAL MEDICINE

## 2025-02-28 PROCEDURE — 83735 ASSAY OF MAGNESIUM: CPT

## 2025-02-28 PROCEDURE — 83605 ASSAY OF LACTIC ACID: CPT

## 2025-02-28 PROCEDURE — 84100 ASSAY OF PHOSPHORUS: CPT

## 2025-02-28 PROCEDURE — 2580000003 HC RX 258: Performed by: INTERNAL MEDICINE

## 2025-02-28 PROCEDURE — 99232 SBSQ HOSP IP/OBS MODERATE 35: CPT | Performed by: INTERNAL MEDICINE

## 2025-02-28 PROCEDURE — 97530 THERAPEUTIC ACTIVITIES: CPT

## 2025-02-28 PROCEDURE — 81001 URINALYSIS AUTO W/SCOPE: CPT

## 2025-02-28 PROCEDURE — 36415 COLL VENOUS BLD VENIPUNCTURE: CPT

## 2025-02-28 PROCEDURE — 6360000002 HC RX W HCPCS: Performed by: INTERNAL MEDICINE

## 2025-02-28 PROCEDURE — 80053 COMPREHEN METABOLIC PANEL: CPT

## 2025-02-28 RX ORDER — SODIUM CHLORIDE 9 MG/ML
INJECTION, SOLUTION INTRAVENOUS CONTINUOUS
Status: DISCONTINUED | OUTPATIENT
Start: 2025-02-28 | End: 2025-03-02

## 2025-02-28 RX ADMIN — SODIUM CHLORIDE, PRESERVATIVE FREE 10 ML: 5 INJECTION INTRAVENOUS at 10:09

## 2025-02-28 RX ADMIN — METOPROLOL TARTRATE 12.5 MG: 25 TABLET, FILM COATED ORAL at 22:01

## 2025-02-28 RX ADMIN — APIXABAN 5 MG: 5 TABLET, FILM COATED ORAL at 22:01

## 2025-02-28 RX ADMIN — VENLAFAXINE HYDROCHLORIDE 50 MG: 25 TABLET ORAL at 22:10

## 2025-02-28 RX ADMIN — VENLAFAXINE HYDROCHLORIDE 50 MG: 25 TABLET ORAL at 15:04

## 2025-02-28 RX ADMIN — MYCOPHENOLATE MOFETIL 1000 MG: 250 CAPSULE ORAL at 10:05

## 2025-02-28 RX ADMIN — CLOZAPINE 25 MG: 25 TABLET ORAL at 22:02

## 2025-02-28 RX ADMIN — MYCOPHENOLATE MOFETIL 1000 MG: 250 CAPSULE ORAL at 22:01

## 2025-02-28 RX ADMIN — CEFDINIR 300 MG: 300 CAPSULE ORAL at 10:04

## 2025-02-28 RX ADMIN — FERROUS SULFATE TAB 325 MG (65 MG ELEMENTAL FE) 325 MG: 325 (65 FE) TAB at 10:04

## 2025-02-28 RX ADMIN — APIXABAN 5 MG: 5 TABLET, FILM COATED ORAL at 10:05

## 2025-02-28 RX ADMIN — CYCLOSPORINE 100 MG: 25 CAPSULE, GELATIN COATED ORAL at 22:03

## 2025-02-28 RX ADMIN — FOLIC ACID 1 MG: 1 TABLET ORAL at 10:05

## 2025-02-28 RX ADMIN — METOPROLOL TARTRATE 12.5 MG: 25 TABLET, FILM COATED ORAL at 10:08

## 2025-02-28 RX ADMIN — OXYCODONE AND ACETAMINOPHEN 1 TABLET: 5; 325 TABLET ORAL at 22:01

## 2025-02-28 RX ADMIN — POLYETHYLENE GLYCOL 3350 17 G: 17 POWDER, FOR SOLUTION ORAL at 17:30

## 2025-02-28 RX ADMIN — CYCLOSPORINE 100 MG: 25 CAPSULE, GELATIN COATED ORAL at 10:05

## 2025-02-28 RX ADMIN — PREDNISONE 5 MG: 5 TABLET ORAL at 10:04

## 2025-02-28 RX ADMIN — VENLAFAXINE HYDROCHLORIDE 50 MG: 25 TABLET ORAL at 10:05

## 2025-02-28 RX ADMIN — SODIUM CHLORIDE: 0.9 INJECTION, SOLUTION INTRAVENOUS at 10:19

## 2025-02-28 RX ADMIN — ATORVASTATIN CALCIUM 20 MG: 20 TABLET, FILM COATED ORAL at 22:01

## 2025-02-28 RX ADMIN — LEVOTHYROXINE SODIUM 50 MCG: 0.05 TABLET ORAL at 10:04

## 2025-02-28 ASSESSMENT — PAIN DESCRIPTION - DESCRIPTORS: DESCRIPTORS: DISCOMFORT;ACHING;TENDER

## 2025-02-28 ASSESSMENT — PAIN DESCRIPTION - ORIENTATION: ORIENTATION: LOWER;MID

## 2025-02-28 ASSESSMENT — PAIN SCALES - GENERAL: PAINLEVEL_OUTOF10: 10

## 2025-02-28 ASSESSMENT — PAIN - FUNCTIONAL ASSESSMENT: PAIN_FUNCTIONAL_ASSESSMENT: PREVENTS OR INTERFERES SOME ACTIVE ACTIVITIES AND ADLS

## 2025-02-28 ASSESSMENT — PAIN DESCRIPTION - LOCATION: LOCATION: BACK;FOOT

## 2025-02-28 NOTE — ED NOTES
Gita in er wr  
Huy, , at patient bedside.  
PT and OT at patient bedside at this time.  
Report called to 3rd floor RN.  
today

## 2025-02-28 NOTE — CARE COORDINATION
2/28/25 SS NOTE: Observation:  Notified of [facility] approval obtained for MARINA placement at [facility], per nursing/physician anticipate discharge tomorrow 3/1, Kateryna admissions liaison confirmed pt can admit tomorrow, PAS via stretcher placed on a \"WILL CALL\", PASRR completed and GINA initiated, pt and nursing informed. Electronically signed by AG De La Rosa on 2/28/2025 at 1:41 PM

## 2025-02-28 NOTE — CARE COORDINATION
2/28/25 SS NOTE:  PT accepted at [facility], transfer pending [facility] approval, NO PRE CERT or 3 day Inpt hospital stay NEEDED prior to transfer, PASRR completed and GINA initiated, sw will follow to confirm transfer arrangements, pt and nursing informed. Electronically signed by AG De La Rosa on 2/28/2025 at 10:36 AM

## 2025-02-28 NOTE — DISCHARGE INSTRUCTIONS
take ibuprofen (Advil or Motrin) and naproxen (Aleve) without talking to your doctor first. They could make your heart failure worse.         WEIGHT MONITORING:   Weigh yourself everyday (with the same scale) around the same time of the day and write it down. (you can chart them on a calendar or keep track of them on paper.   Notify your doctor of a weight gain of 3 pounds or more in 1 day   OR a total of 5 pounds or more in 1 week    Take your weight record to your doctor visits  Also, the same goes if you loose more than 3# in one day, let your heart doctor know.         DIET:   Cardiac heart healthy diet- Low saturated / low trans fat, no added salt, caffeine restricted, Low sodium diet-   No more than 2,000mg (2 grams) of salt / sodium per day (which equals to a little less than  a teaspoon of salt)  If your doctor wants you on a fluid restriction...it is usually recommended a fluid limit of 2,000cc -  Fluid restriction- 2,000 ml (milliliters) = 64 ounces = you can have 8 glasses of fluid per day (each glass 8 ounces)    Follow a low salt diet - avoid using salt at the table, avoid / limit use of canned soups, processed / packaged foods, salted snacks, olives and pickles.  Do not use a salt substitute without checking with your doctor, they may contain a high amount of potassioum. (Mrs. Dash is safe to use).    Limit the use of alcohol       CALL YOUR DOCTOR THE FIRST DAY YOU NOTICE ANY OF THESE   SYMPTOMS:  You have a weight gain of 3 pounds or more in 1 day         OR 5 pounds or more in one week  More shortness of breath  More swelling of your stomach, legs, ankles or feet  Feeling more tired, No energy  Dry hacky cough  Dizziness  More chest pain / discomfort       (CALL 911 IF ANY OF THE FOLLOWING OCCURS  Chest pain (not relieved with nitroglycerine, if you have been prescribed this medication)  Severe shortness of breath  Faint / Pass out  Confusion / cannot think clearly  If symptoms get worse

## 2025-02-28 NOTE — DISCHARGE INSTR - COC
Continuity of Care Form    Patient Name: Natali Wolff   :  1955  MRN:  [id number]    Admit date:  2025  Discharge date:  3/3/2025    Code Status Order: Full Code   Advance Directives:   Advance Care Flowsheet Documentation             Admitting Physician:  Darin Brown DO  PCP: Brennon Malave DO    [facility]7-01  Discharging Unit Phone Number: [phone]    Emergency Contact:   Extended Emergency Contact Information  Primary Emergency Contact: Gita Russell  Home Phone: [phone]  Mobile Phone: [phone]  Relation: Other Relative  Preferred language: English   needed? No  Secondary Emergency Contact: Mayur Cheema   [facility]  Home Phone: [phone]  Mobile Phone: [phone]  Relation: Child   needed? No    Past Surgical History:  Past Surgical History:   Procedure Laterality Date    CATARACT REMOVAL Right 2023    approx     SECTION  1985    one normal delivery    COLECTOMY N/A 01/15/2022    DIAGNOSTIC  LAPAROSCOPY LYSIS OF ADHESIONS performed by Carl Rordíguez MD at [facility] OR    COLONOSCOPY      DIAGNOSTIC CARDIAC CATH LAB PROCEDURE      normal coronaries and  normal coronaries    DIALYSIS FISTULA CREATION  march and 2012    phase one and two patient will start dialysis when needed    FEMORAL BYPASS  1988    transfemoral venous bypass grafts    IR GUIDED IVC FILTER PLACEMENT  2022    IR IVC FILTER PLACEMENT W IMAGING 2022 [facility] SPECIAL PROCEDURES    KIDNEY TRANSPLANT  2016    KIDNEY TRANSPLANT  2015    KIDNEY TRANSPLANT  2015    PARATHYROIDECTOMY  2006    left parathyroid  implant and parathyroidectomy    TRANSESOPHAGEAL ECHOCARDIOGRAM N/A 2022    TRANSESOPHAGEAL ECHOCARDIOGRAM WITH BUBBLE STUDY performed by Rafa Renteria MD at [facility] ENDOSCOPY       Immunization History:   Immunization History   Administered Date(s) Administered    COVID-19,

## 2025-03-01 ENCOUNTER — APPOINTMENT (OUTPATIENT)
Dept: CT IMAGING | Age: 70
DRG: 641 | End: 2025-03-01
Payer: MEDICARE

## 2025-03-01 LAB
ANION GAP SERPL CALCULATED.3IONS-SCNC: 11 MMOL/L (ref 7–16)
BASOPHILS # BLD: 0.05 K/UL (ref 0–0.2)
BASOPHILS NFR BLD: 1 % (ref 0–2)
BUN SERPL-MCNC: 37 MG/DL (ref 6–23)
CALCIUM SERPL-MCNC: 9.6 MG/DL (ref 8.6–10.2)
CHLORIDE SERPL-SCNC: 102 MMOL/L (ref 98–107)
CO2 SERPL-SCNC: 25 MMOL/L (ref 22–29)
CREAT SERPL-MCNC: 1.6 MG/DL (ref 0.5–1)
EOSINOPHIL # BLD: 0.15 K/UL (ref 0.05–0.5)
EOSINOPHILS RELATIVE PERCENT: 1 % (ref 0–6)
ERYTHROCYTE [DISTWIDTH] IN BLOOD BY AUTOMATED COUNT: 14 % (ref 11.5–15)
GFR, ESTIMATED: 34 ML/MIN/1.73M2
GLUCOSE SERPL-MCNC: 112 MG/DL (ref 74–99)
HCT VFR BLD AUTO: 38.7 % (ref 34–48)
HGB BLD-MCNC: 11.7 G/DL (ref 11.5–15.5)
IMM GRANULOCYTES # BLD AUTO: 0.06 K/UL (ref 0–0.58)
IMM GRANULOCYTES NFR BLD: 1 % (ref 0–5)
LYMPHOCYTES NFR BLD: 1.01 K/UL (ref 1.5–4)
LYMPHOCYTES RELATIVE PERCENT: 10 % (ref 20–42)
MAGNESIUM SERPL-MCNC: 2 MG/DL (ref 1.6–2.6)
MCH RBC QN AUTO: 31.1 PG (ref 26–35)
MCHC RBC AUTO-ENTMCNC: 30.2 G/DL (ref 32–34.5)
MCV RBC AUTO: 102.9 FL (ref 80–99.9)
MONOCYTES NFR BLD: 0.97 K/UL (ref 0.1–0.95)
MONOCYTES NFR BLD: 9 % (ref 2–12)
NEUTROPHILS NFR BLD: 79 % (ref 43–80)
NEUTS SEG NFR BLD: 8.29 K/UL (ref 1.8–7.3)
PHOSPHATE SERPL-MCNC: 2.8 MG/DL (ref 2.5–4.5)
PLATELET, FLUORESCENCE: 118 K/UL (ref 130–450)
PMV BLD AUTO: 13.4 FL (ref 7–12)
POTASSIUM SERPL-SCNC: 4 MMOL/L (ref 3.5–5)
RBC # BLD AUTO: 3.76 M/UL (ref 3.5–5.5)
SODIUM SERPL-SCNC: 138 MMOL/L (ref 132–146)
WBC OTHER # BLD: 10.5 K/UL (ref 4.5–11.5)

## 2025-03-01 PROCEDURE — 2580000003 HC RX 258: Performed by: INTERNAL MEDICINE

## 2025-03-01 PROCEDURE — 84100 ASSAY OF PHOSPHORUS: CPT

## 2025-03-01 PROCEDURE — 99221 1ST HOSP IP/OBS SF/LOW 40: CPT | Performed by: ORTHOPAEDIC SURGERY

## 2025-03-01 PROCEDURE — 73700 CT LOWER EXTREMITY W/O DYE: CPT

## 2025-03-01 PROCEDURE — 2500000003 HC RX 250 WO HCPCS: Performed by: INTERNAL MEDICINE

## 2025-03-01 PROCEDURE — G0378 HOSPITAL OBSERVATION PER HR: HCPCS | Performed by: INTERNAL MEDICINE

## 2025-03-01 PROCEDURE — 99232 SBSQ HOSP IP/OBS MODERATE 35: CPT | Performed by: INTERNAL MEDICINE

## 2025-03-01 PROCEDURE — 80048 BASIC METABOLIC PNL TOTAL CA: CPT

## 2025-03-01 PROCEDURE — 97530 THERAPEUTIC ACTIVITIES: CPT

## 2025-03-01 PROCEDURE — G0378 HOSPITAL OBSERVATION PER HR: HCPCS

## 2025-03-01 PROCEDURE — 6360000002 HC RX W HCPCS: Performed by: INTERNAL MEDICINE

## 2025-03-01 PROCEDURE — 83735 ASSAY OF MAGNESIUM: CPT

## 2025-03-01 PROCEDURE — 6370000000 HC RX 637 (ALT 250 FOR IP): Performed by: INTERNAL MEDICINE

## 2025-03-01 PROCEDURE — 36415 COLL VENOUS BLD VENIPUNCTURE: CPT

## 2025-03-01 PROCEDURE — 85025 COMPLETE CBC W/AUTO DIFF WBC: CPT

## 2025-03-01 RX ADMIN — VENLAFAXINE HYDROCHLORIDE 50 MG: 25 TABLET ORAL at 21:51

## 2025-03-01 RX ADMIN — METOPROLOL TARTRATE 12.5 MG: 25 TABLET, FILM COATED ORAL at 08:45

## 2025-03-01 RX ADMIN — SODIUM CHLORIDE, PRESERVATIVE FREE 10 ML: 5 INJECTION INTRAVENOUS at 20:44

## 2025-03-01 RX ADMIN — CEFDINIR 300 MG: 300 CAPSULE ORAL at 08:45

## 2025-03-01 RX ADMIN — SODIUM CHLORIDE, PRESERVATIVE FREE 10 ML: 5 INJECTION INTRAVENOUS at 08:47

## 2025-03-01 RX ADMIN — LEVOTHYROXINE SODIUM 50 MCG: 0.05 TABLET ORAL at 08:45

## 2025-03-01 RX ADMIN — FERROUS SULFATE TAB 325 MG (65 MG ELEMENTAL FE) 325 MG: 325 (65 FE) TAB at 08:47

## 2025-03-01 RX ADMIN — APIXABAN 5 MG: 5 TABLET, FILM COATED ORAL at 20:44

## 2025-03-01 RX ADMIN — MYCOPHENOLATE MOFETIL 1000 MG: 250 CAPSULE ORAL at 21:51

## 2025-03-01 RX ADMIN — VENLAFAXINE HYDROCHLORIDE 50 MG: 25 TABLET ORAL at 09:00

## 2025-03-01 RX ADMIN — SODIUM CHLORIDE: 0.9 INJECTION, SOLUTION INTRAVENOUS at 17:13

## 2025-03-01 RX ADMIN — VENLAFAXINE HYDROCHLORIDE 50 MG: 25 TABLET ORAL at 15:56

## 2025-03-01 RX ADMIN — FOLIC ACID 1 MG: 1 TABLET ORAL at 08:46

## 2025-03-01 RX ADMIN — APIXABAN 5 MG: 5 TABLET, FILM COATED ORAL at 08:46

## 2025-03-01 RX ADMIN — CYCLOSPORINE 100 MG: 25 CAPSULE, GELATIN COATED ORAL at 21:50

## 2025-03-01 RX ADMIN — POLYETHYLENE GLYCOL 3350 17 G: 17 POWDER, FOR SOLUTION ORAL at 20:45

## 2025-03-01 RX ADMIN — PREDNISONE 5 MG: 5 TABLET ORAL at 08:46

## 2025-03-01 RX ADMIN — CYCLOSPORINE 100 MG: 25 CAPSULE, GELATIN COATED ORAL at 08:47

## 2025-03-01 RX ADMIN — MYCOPHENOLATE MOFETIL 1000 MG: 250 CAPSULE ORAL at 08:47

## 2025-03-01 RX ADMIN — METOPROLOL TARTRATE 12.5 MG: 25 TABLET, FILM COATED ORAL at 20:44

## 2025-03-01 RX ADMIN — ATORVASTATIN CALCIUM 20 MG: 20 TABLET, FILM COATED ORAL at 20:44

## 2025-03-01 RX ADMIN — CLOZAPINE 25 MG: 25 TABLET ORAL at 21:50

## 2025-03-01 RX ADMIN — ACETAMINOPHEN 650 MG: 325 TABLET ORAL at 08:58

## 2025-03-01 ASSESSMENT — PAIN DESCRIPTION - DESCRIPTORS: DESCRIPTORS: ACHING;SHARP

## 2025-03-01 ASSESSMENT — PAIN DESCRIPTION - LOCATION: LOCATION: LEG

## 2025-03-01 ASSESSMENT — PAIN SCALES - GENERAL
PAINLEVEL_OUTOF10: 4
PAINLEVEL_OUTOF10: 10

## 2025-03-01 ASSESSMENT — PAIN DESCRIPTION - ORIENTATION: ORIENTATION: RIGHT;LEFT

## 2025-03-02 ENCOUNTER — APPOINTMENT (OUTPATIENT)
Dept: ULTRASOUND IMAGING | Age: 70
DRG: 641 | End: 2025-03-02
Payer: MEDICARE

## 2025-03-02 PROBLEM — E86.0 DEHYDRATION: Status: ACTIVE | Noted: 2025-03-02

## 2025-03-02 LAB
ANION GAP SERPL CALCULATED.3IONS-SCNC: 10 MMOL/L (ref 7–16)
BASOPHILS # BLD: 0.03 K/UL (ref 0–0.2)
BASOPHILS NFR BLD: 0 % (ref 0–2)
BUN SERPL-MCNC: 34 MG/DL (ref 6–23)
CALCIUM SERPL-MCNC: 9.7 MG/DL (ref 8.6–10.2)
CHLORIDE SERPL-SCNC: 106 MMOL/L (ref 98–107)
CO2 SERPL-SCNC: 24 MMOL/L (ref 22–29)
CREAT SERPL-MCNC: 1.5 MG/DL (ref 0.5–1)
EOSINOPHIL # BLD: 0.13 K/UL (ref 0.05–0.5)
EOSINOPHILS RELATIVE PERCENT: 1 % (ref 0–6)
ERYTHROCYTE [DISTWIDTH] IN BLOOD BY AUTOMATED COUNT: 13.9 % (ref 11.5–15)
GFR, ESTIMATED: 37 ML/MIN/1.73M2
GLUCOSE SERPL-MCNC: 84 MG/DL (ref 74–99)
HCT VFR BLD AUTO: 37.3 % (ref 34–48)
HGB BLD-MCNC: 11 G/DL (ref 11.5–15.5)
IMM GRANULOCYTES # BLD AUTO: 0.08 K/UL (ref 0–0.58)
IMM GRANULOCYTES NFR BLD: 1 % (ref 0–5)
LYMPHOCYTES NFR BLD: 1.04 K/UL (ref 1.5–4)
LYMPHOCYTES RELATIVE PERCENT: 9 % (ref 20–42)
MCH RBC QN AUTO: 30.7 PG (ref 26–35)
MCHC RBC AUTO-ENTMCNC: 29.5 G/DL (ref 32–34.5)
MCV RBC AUTO: 104.2 FL (ref 80–99.9)
MONOCYTES NFR BLD: 1.18 K/UL (ref 0.1–0.95)
MONOCYTES NFR BLD: 10 % (ref 2–12)
NEUTROPHILS NFR BLD: 79 % (ref 43–80)
NEUTS SEG NFR BLD: 9.42 K/UL (ref 1.8–7.3)
PLATELET, FLUORESCENCE: 131 K/UL (ref 130–450)
PMV BLD AUTO: 13.3 FL (ref 7–12)
POTASSIUM SERPL-SCNC: 4.4 MMOL/L (ref 3.5–5)
RBC # BLD AUTO: 3.58 M/UL (ref 3.5–5.5)
SODIUM SERPL-SCNC: 140 MMOL/L (ref 132–146)
WBC OTHER # BLD: 11.9 K/UL (ref 4.5–11.5)

## 2025-03-02 PROCEDURE — 36415 COLL VENOUS BLD VENIPUNCTURE: CPT

## 2025-03-02 PROCEDURE — G0378 HOSPITAL OBSERVATION PER HR: HCPCS

## 2025-03-02 PROCEDURE — 6370000000 HC RX 637 (ALT 250 FOR IP): Performed by: INTERNAL MEDICINE

## 2025-03-02 PROCEDURE — 76770 US EXAM ABDO BACK WALL COMP: CPT

## 2025-03-02 PROCEDURE — 99232 SBSQ HOSP IP/OBS MODERATE 35: CPT | Performed by: INTERNAL MEDICINE

## 2025-03-02 PROCEDURE — 1200000000 HC SEMI PRIVATE

## 2025-03-02 PROCEDURE — 2500000003 HC RX 250 WO HCPCS: Performed by: INTERNAL MEDICINE

## 2025-03-02 PROCEDURE — 6360000002 HC RX W HCPCS: Performed by: INTERNAL MEDICINE

## 2025-03-02 PROCEDURE — 85025 COMPLETE CBC W/AUTO DIFF WBC: CPT

## 2025-03-02 PROCEDURE — 80048 BASIC METABOLIC PNL TOTAL CA: CPT

## 2025-03-02 RX ORDER — BISACODYL 10 MG
10 SUPPOSITORY, RECTAL RECTAL ONCE
Status: COMPLETED | OUTPATIENT
Start: 2025-03-02 | End: 2025-03-02

## 2025-03-02 RX ORDER — DOCUSATE SODIUM 100 MG/1
100 CAPSULE, LIQUID FILLED ORAL DAILY
Status: DISCONTINUED | OUTPATIENT
Start: 2025-03-02 | End: 2025-03-03 | Stop reason: HOSPADM

## 2025-03-02 RX ADMIN — CEFDINIR 300 MG: 300 CAPSULE ORAL at 08:40

## 2025-03-02 RX ADMIN — BISACODYL 10 MG: 10 SUPPOSITORY RECTAL at 15:37

## 2025-03-02 RX ADMIN — FERROUS SULFATE TAB 325 MG (65 MG ELEMENTAL FE) 325 MG: 325 (65 FE) TAB at 08:40

## 2025-03-02 RX ADMIN — ACETAMINOPHEN 650 MG: 325 TABLET ORAL at 11:37

## 2025-03-02 RX ADMIN — CLOZAPINE 25 MG: 25 TABLET ORAL at 23:20

## 2025-03-02 RX ADMIN — ATORVASTATIN CALCIUM 20 MG: 20 TABLET, FILM COATED ORAL at 21:48

## 2025-03-02 RX ADMIN — LEVOTHYROXINE SODIUM 50 MCG: 0.05 TABLET ORAL at 08:40

## 2025-03-02 RX ADMIN — ACETAMINOPHEN 650 MG: 325 TABLET ORAL at 22:02

## 2025-03-02 RX ADMIN — DOCUSATE SODIUM 100 MG: 100 CAPSULE, LIQUID FILLED ORAL at 15:37

## 2025-03-02 RX ADMIN — CYCLOSPORINE 100 MG: 25 CAPSULE, GELATIN COATED ORAL at 11:33

## 2025-03-02 RX ADMIN — METOPROLOL TARTRATE 12.5 MG: 25 TABLET, FILM COATED ORAL at 08:40

## 2025-03-02 RX ADMIN — ONDANSETRON 4 MG: 2 INJECTION, SOLUTION INTRAMUSCULAR; INTRAVENOUS at 21:42

## 2025-03-02 RX ADMIN — VENLAFAXINE HYDROCHLORIDE 50 MG: 25 TABLET ORAL at 15:37

## 2025-03-02 RX ADMIN — MYCOPHENOLATE MOFETIL 1000 MG: 250 CAPSULE ORAL at 21:42

## 2025-03-02 RX ADMIN — FOLIC ACID 1 MG: 1 TABLET ORAL at 08:40

## 2025-03-02 RX ADMIN — CYCLOSPORINE 100 MG: 25 CAPSULE, GELATIN COATED ORAL at 21:43

## 2025-03-02 RX ADMIN — SODIUM CHLORIDE, PRESERVATIVE FREE 10 ML: 5 INJECTION INTRAVENOUS at 08:40

## 2025-03-02 RX ADMIN — PREDNISONE 5 MG: 5 TABLET ORAL at 08:42

## 2025-03-02 RX ADMIN — APIXABAN 5 MG: 5 TABLET, FILM COATED ORAL at 08:40

## 2025-03-02 RX ADMIN — VENLAFAXINE HYDROCHLORIDE 50 MG: 25 TABLET ORAL at 21:42

## 2025-03-02 RX ADMIN — VENLAFAXINE HYDROCHLORIDE 50 MG: 25 TABLET ORAL at 11:32

## 2025-03-02 RX ADMIN — METOPROLOL TARTRATE 12.5 MG: 25 TABLET, FILM COATED ORAL at 21:43

## 2025-03-02 RX ADMIN — MYCOPHENOLATE MOFETIL 1000 MG: 250 CAPSULE ORAL at 11:32

## 2025-03-02 RX ADMIN — ACETAMINOPHEN 650 MG: 325 TABLET ORAL at 03:00

## 2025-03-02 RX ADMIN — APIXABAN 5 MG: 5 TABLET, FILM COATED ORAL at 21:43

## 2025-03-02 RX ADMIN — SODIUM CHLORIDE, PRESERVATIVE FREE 10 ML: 5 INJECTION INTRAVENOUS at 21:47

## 2025-03-02 ASSESSMENT — PAIN SCALES - GENERAL
PAINLEVEL_OUTOF10: 10
PAINLEVEL_OUTOF10: 10
PAINLEVEL_OUTOF10: 5
PAINLEVEL_OUTOF10: 5

## 2025-03-02 ASSESSMENT — PAIN DESCRIPTION - LOCATION
LOCATION: LEG
LOCATION: GENERALIZED

## 2025-03-02 ASSESSMENT — PAIN DESCRIPTION - DESCRIPTORS
DESCRIPTORS: ACHING;SHARP
DESCRIPTORS: ACHING;SORE;TENDER

## 2025-03-02 ASSESSMENT — PAIN DESCRIPTION - ORIENTATION: ORIENTATION: RIGHT;LEFT

## 2025-03-02 NOTE — CONSULTS
3/2/2025 3:50 PM  Service: Urology  Group: [facility] (Remberto/Paulette/Matthias)    Natali Wolff  [id number]     Chief Complaint: right renal mass    History of Present Illness:  The patient is a 69 y.o. female patient who presents with back pain  He is know to our practice  He does have bilateral renal cysts  He does have a HO transplant  He was seen 1 year  He did have a MRI done and that showed a renal mass    Past Medical History:   Diagnosis Date    Abnormal EKG     left bundle branch block    Acute cystitis without hematuria 2023    Acute cystitis without hematuria 2023    Acute exacerbation of chronic obstructive pulmonary disease (COPD) (HCC) 2022    Acute gout involving toe of right foot 2020    Acute on chronic combined systolic (congestive) and diastolic (congestive) heart failure (HCC)     asthmatic bronchitis 2019    Bipolar disorder ([facility]) 2018    Cancer ([facility])     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    Cerebrovascular disease     Clotting disorder     thrombophlebitis after c section delivery    Depression     15 years followed by dr zuluaga    Hemodialysis patient     History of blood transfusion     History of renal transplant 10/2015     in [place] Dr Oscar Wilkinson    Hyperlipidemia     Hypertension     Hypothyroidism     Hypothyroidism 2018    Leg swelling 2020    Leukocytosis 01/10/2022    Lymphedema of both lower extremities 2020    Other emphysema ([facility]) 2024    Paroxysmal atrial fibrillation ([facility])     Peripheral vascular disease     Renal failure 2012    renal transplant    Sepsis ([facility])         Thrombophlebitis     after c section delivery    Thyroid disease        Past Surgical History:   Procedure Laterality Date    CATARACT REMOVAL Right 2023    approx     SECTION  1985    one normal delivery    COLECTOMY N/A 01/15/2022    DIAGNOSTIC  LAPAROSCOPY LYSIS OF ADHESIONS performed by 
infusion, , IntraVENous, Continuous  apixaban (ELIQUIS) tablet 5 mg, 5 mg, Oral, BID  cefdinir (OMNICEF) capsule 300 mg, 300 mg, Oral, Daily  cloZAPine (CLOZARIL) tablet 25 mg, 25 mg, Oral, Daily  cycloSPORINE (SANDIMMUNE) capsule 100 mg, 100 mg, Oral, BID  venlafaxine (EFFEXOR) tablet 50 mg, 50 mg, Oral, TID  ferrous sulfate (IRON 325) tablet 325 mg, 325 mg, Oral, Daily with breakfast  folic acid (FOLVITE) tablet 1 mg, 1 mg, Oral, Daily  levothyroxine (SYNTHROID) tablet 50 mcg, 50 mcg, Oral, Daily  metoprolol tartrate (LOPRESSOR) tablet 12.5 mg, 12.5 mg, Oral, BID  oxyCODONE-acetaminophen (PERCOCET) 5-325 MG per tablet 1 tablet, 1 tablet, Oral, Q4H PRN  predniSONE (DELTASONE) tablet 5 mg, 5 mg, Oral, Daily  atorvastatin (LIPITOR) tablet 20 mg, 20 mg, Oral, Nightly  sodium chloride flush 0.9 % injection 5-40 mL, 5-40 mL, IntraVENous, 2 times per day  sodium chloride flush 0.9 % injection 5-40 mL, 5-40 mL, IntraVENous, PRN  0.9 % sodium chloride infusion, , IntraVENous, PRN  ondansetron (ZOFRAN-ODT) disintegrating tablet 4 mg, 4 mg, Oral, Q8H PRN **OR** ondansetron (ZOFRAN) injection 4 mg, 4 mg, IntraVENous, Q6H PRN  polyethylene glycol (GLYCOLAX) packet 17 g, 17 g, Oral, Daily PRN  acetaminophen (TYLENOL) tablet 650 mg, 650 mg, Oral, Q6H PRN **OR** acetaminophen (TYLENOL) suppository 650 mg, 650 mg, Rectal, Q6H PRN  mycophenolate (CELLCEPT) capsule 1,000 mg, 1,000 mg, Oral, BID  Allergies:  Cipro [ciprofloxacin hcl], Macrodantin [nitrofurantoin macrocrystal], and Sulfa antibiotics    Social History:   TOBACCO:   reports that she quit smoking about 9 years ago. Her smoking use included cigarettes. She started smoking about 29 years ago. She has a 20 pack-year smoking history. She has never used smokeless tobacco.  ETOH:   reports no history of alcohol use.  DRUGS:   reports no history of drug use.  Family History:       Problem Relation Age of Onset    Diabetes Mother     Diabetes Father     Dementia Father

## 2025-03-02 NOTE — CARE COORDINATION
Weekend follow up- change of status from observation to admission. 3/2/2025 admit orders obtained, IMM completed. Electronically signed by Nely Draper RN-BC on 3/2/2025 at 12:19 PM

## 2025-03-03 VITALS
HEIGHT: 62 IN | BODY MASS INDEX: 41.17 KG/M2 | TEMPERATURE: 98.2 F | RESPIRATION RATE: 16 BRPM | HEART RATE: 77 BPM | SYSTOLIC BLOOD PRESSURE: 122 MMHG | DIASTOLIC BLOOD PRESSURE: 67 MMHG | WEIGHT: 223.7 LBS | OXYGEN SATURATION: 95 %

## 2025-03-03 PROBLEM — R26.2 AMBULATORY DYSFUNCTION: Status: ACTIVE | Noted: 2025-03-03

## 2025-03-03 LAB
ANION GAP SERPL CALCULATED.3IONS-SCNC: 9 MMOL/L (ref 7–16)
BASOPHILS # BLD: 0.04 K/UL (ref 0–0.2)
BASOPHILS NFR BLD: 0 % (ref 0–2)
BUN SERPL-MCNC: 30 MG/DL (ref 6–23)
CALCIUM SERPL-MCNC: 9.7 MG/DL (ref 8.6–10.2)
CHLORIDE SERPL-SCNC: 107 MMOL/L (ref 98–107)
CO2 SERPL-SCNC: 25 MMOL/L (ref 22–29)
CREAT SERPL-MCNC: 1.6 MG/DL (ref 0.5–1)
EOSINOPHIL # BLD: 0.09 K/UL (ref 0.05–0.5)
EOSINOPHILS RELATIVE PERCENT: 1 % (ref 0–6)
ERYTHROCYTE [DISTWIDTH] IN BLOOD BY AUTOMATED COUNT: 13.9 % (ref 11.5–15)
GFR, ESTIMATED: 34 ML/MIN/1.73M2
GLUCOSE SERPL-MCNC: 104 MG/DL (ref 74–99)
HCT VFR BLD AUTO: 37.2 % (ref 34–48)
HGB BLD-MCNC: 11.1 G/DL (ref 11.5–15.5)
IMM GRANULOCYTES # BLD AUTO: 0.07 K/UL (ref 0–0.58)
IMM GRANULOCYTES NFR BLD: 1 % (ref 0–5)
LYMPHOCYTES NFR BLD: 0.93 K/UL (ref 1.5–4)
LYMPHOCYTES RELATIVE PERCENT: 7 % (ref 20–42)
MAGNESIUM SERPL-MCNC: 1.7 MG/DL (ref 1.6–2.6)
MCH RBC QN AUTO: 30.9 PG (ref 26–35)
MCHC RBC AUTO-ENTMCNC: 29.8 G/DL (ref 32–34.5)
MCV RBC AUTO: 103.6 FL (ref 80–99.9)
MONOCYTES NFR BLD: 1.35 K/UL (ref 0.1–0.95)
MONOCYTES NFR BLD: 11 % (ref 2–12)
NEUTROPHILS NFR BLD: 80 % (ref 43–80)
NEUTS SEG NFR BLD: 10.18 K/UL (ref 1.8–7.3)
PHOSPHATE SERPL-MCNC: 2.6 MG/DL (ref 2.5–4.5)
PLATELET, FLUORESCENCE: 124 K/UL (ref 130–450)
PMV BLD AUTO: 13.9 FL (ref 7–12)
POTASSIUM SERPL-SCNC: 4.6 MMOL/L (ref 3.5–5)
RBC # BLD AUTO: 3.59 M/UL (ref 3.5–5.5)
SODIUM SERPL-SCNC: 141 MMOL/L (ref 132–146)
WBC OTHER # BLD: 12.7 K/UL (ref 4.5–11.5)

## 2025-03-03 PROCEDURE — 85025 COMPLETE CBC W/AUTO DIFF WBC: CPT

## 2025-03-03 PROCEDURE — 2500000003 HC RX 250 WO HCPCS: Performed by: INTERNAL MEDICINE

## 2025-03-03 PROCEDURE — 84100 ASSAY OF PHOSPHORUS: CPT

## 2025-03-03 PROCEDURE — 6360000002 HC RX W HCPCS: Performed by: INTERNAL MEDICINE

## 2025-03-03 PROCEDURE — 36415 COLL VENOUS BLD VENIPUNCTURE: CPT

## 2025-03-03 PROCEDURE — 83735 ASSAY OF MAGNESIUM: CPT

## 2025-03-03 PROCEDURE — 80048 BASIC METABOLIC PNL TOTAL CA: CPT

## 2025-03-03 PROCEDURE — 6370000000 HC RX 637 (ALT 250 FOR IP): Performed by: INTERNAL MEDICINE

## 2025-03-03 RX ORDER — MAGNESIUM OXIDE 400 MG/1
400 TABLET ORAL 2 TIMES DAILY
Status: DISCONTINUED | OUTPATIENT
Start: 2025-03-03 | End: 2025-03-03 | Stop reason: HOSPADM

## 2025-03-03 RX ORDER — MAGNESIUM OXIDE 400 MG/1
400 TABLET ORAL 2 TIMES DAILY
DISCHARGE
Start: 2025-03-03

## 2025-03-03 RX ORDER — OXYCODONE AND ACETAMINOPHEN 5; 325 MG/1; MG/1
1 TABLET ORAL EVERY 4 HOURS PRN
Qty: 180 TABLET | Refills: 0 | Status: SHIPPED | OUTPATIENT
Start: 2025-03-03 | End: 2025-04-02

## 2025-03-03 RX ADMIN — ACETAMINOPHEN 650 MG: 325 TABLET ORAL at 14:37

## 2025-03-03 RX ADMIN — CEFDINIR 300 MG: 300 CAPSULE ORAL at 08:11

## 2025-03-03 RX ADMIN — PREDNISONE 5 MG: 5 TABLET ORAL at 08:11

## 2025-03-03 RX ADMIN — CYCLOSPORINE 100 MG: 25 CAPSULE, GELATIN COATED ORAL at 08:12

## 2025-03-03 RX ADMIN — FOLIC ACID 1 MG: 1 TABLET ORAL at 08:11

## 2025-03-03 RX ADMIN — SODIUM CHLORIDE, PRESERVATIVE FREE 10 ML: 5 INJECTION INTRAVENOUS at 08:15

## 2025-03-03 RX ADMIN — DOCUSATE SODIUM 100 MG: 100 CAPSULE, LIQUID FILLED ORAL at 08:11

## 2025-03-03 RX ADMIN — METOPROLOL TARTRATE 12.5 MG: 25 TABLET, FILM COATED ORAL at 08:11

## 2025-03-03 RX ADMIN — MYCOPHENOLATE MOFETIL 1000 MG: 250 CAPSULE ORAL at 08:12

## 2025-03-03 RX ADMIN — ONDANSETRON 4 MG: 2 INJECTION, SOLUTION INTRAMUSCULAR; INTRAVENOUS at 10:59

## 2025-03-03 RX ADMIN — VENLAFAXINE HYDROCHLORIDE 50 MG: 25 TABLET ORAL at 14:40

## 2025-03-03 RX ADMIN — LEVOTHYROXINE SODIUM 50 MCG: 0.05 TABLET ORAL at 08:11

## 2025-03-03 RX ADMIN — VENLAFAXINE HYDROCHLORIDE 50 MG: 25 TABLET ORAL at 08:12

## 2025-03-03 RX ADMIN — FERROUS SULFATE TAB 325 MG (65 MG ELEMENTAL FE) 325 MG: 325 (65 FE) TAB at 08:11

## 2025-03-03 RX ADMIN — APIXABAN 5 MG: 5 TABLET, FILM COATED ORAL at 08:11

## 2025-03-03 ASSESSMENT — PAIN DESCRIPTION - LOCATION: LOCATION: FOOT

## 2025-03-03 ASSESSMENT — PAIN DESCRIPTION - DESCRIPTORS: DESCRIPTORS: ACHING

## 2025-03-03 ASSESSMENT — PAIN DESCRIPTION - ORIENTATION: ORIENTATION: LEFT

## 2025-03-03 ASSESSMENT — PAIN SCALES - GENERAL: PAINLEVEL_OUTOF10: 2

## 2025-03-03 NOTE — DISCHARGE SUMMARY
injuries TECHNOLOGIST PROVIDED HISTORY: Reason for exam:->hx of falls with distracting injuries FINDINGS: BONES/ALIGNMENT: There is no acute fracture or traumatic malalignment.  Prior fracture at C7 with moderate anterior wedging. DEGENERATIVE CHANGES: Overall mild most conspicuous at C6-7 SOFT TISSUES: There is no prevertebral soft tissue swelling.  Apparently absent left thyroid lobe Motion artifacts limit the exam.     Prior C7 moderate wedge compression fracture.  No acute fracture is identified.     CT HEAD WO CONTRAST    Result Date: 2/27/2025  EXAMINATION: CT OF THE HEAD WITHOUT CONTRAST  2/27/2025 5:02 pm TECHNIQUE: CT of the head was performed without the administration of intravenous contrast. Automated exposure control, iterative reconstruction, and/or weight based adjustment of the mA/kV was utilized to reduce the radiation dose to as low as reasonably achievable. COMPARISON: None. HISTORY: ORDERING SYSTEM PROVIDED HISTORY: hx of falls on eliquis r/o hematoma TECHNOLOGIST PROVIDED HISTORY: Has a \"code stroke\" or \"stroke alert\" been called?->No Reason for exam:->hx of falls on eliquis r/o hematoma FINDINGS: BRAIN/VENTRICLES: There is no acute intracranial hemorrhage, mass effect or midline shift.  No abnormal extra-axial fluid collection.  The gray-white differentiation is maintained without evidence of an acute infarct.  There is no evidence of hydrocephalus. ORBITS: The visualized portion of the orbits demonstrate no acute abnormality. SINUSES: The visualized paranasal sinuses and mastoid air cells demonstrate no acute abnormality. SOFT TISSUES/SKULL:  No acute abnormality of the visualized skull or soft tissues.     No acute intracranial abnormality.     XR TIBIA FIBULA RIGHT (2 VIEWS)    Result Date: 2/27/2025  EXAMINATION: XRAY VIEWS OF THE RIGHT TIBIA AND FIBULA 2/27/2025 2:07 pm COMPARISON: None. HISTORY: ORDERING SYSTEM PROVIDED HISTORY: r/o fracture TECHNOLOGIST PROVIDED HISTORY: Reason for

## 2025-03-03 NOTE — PLAN OF CARE
Problem: Chronic Conditions and Co-morbidities  Goal: Patient's chronic conditions and co-morbidity symptoms are monitored and maintained or improved  3/1/2025 0746 by Eri Kam, RN  Outcome: Progressing  2/28/2025 2246 by Isabella Ramirez, RN  Outcome: Progressing     
  Problem: Chronic Conditions and Co-morbidities  Goal: Patient's chronic conditions and co-morbidity symptoms are monitored and maintained or improved  3/2/2025 0727 by Eri Kam, RN  Outcome: Progressing  3/2/2025 0237 by Isabella Ramirez, RN  Outcome: Progressing     
  Problem: Chronic Conditions and Co-morbidities  Goal: Patient's chronic conditions and co-morbidity symptoms are monitored and maintained or improved  Outcome: Progressing     Problem: Discharge Planning  Goal: Discharge to home or other facility with appropriate resources  Outcome: Progressing     Problem: Safety - Adult  Goal: Free from fall injury  Outcome: Progressing     Problem: ABCDS Injury Assessment  Goal: Absence of physical injury  Outcome: Progressing     Problem: Respiratory - Adult  Goal: Achieves optimal ventilation and oxygenation  Outcome: Progressing     Problem: Skin/Tissue Integrity - Adult  Goal: Skin integrity remains intact  Outcome: Progressing     Problem: Neurosensory - Adult  Goal: Achieves stable or improved neurological status  Outcome: Progressing     Problem: Cardiovascular - Adult  Goal: Maintains optimal cardiac output and hemodynamic stability  Outcome: Progressing     Problem: Musculoskeletal - Adult  Goal: Return ADL status to a safe level of function  Outcome: Progressing     Problem: Gastrointestinal - Adult  Goal: Maintains adequate nutritional intake  Outcome: Progressing     Problem: Genitourinary - Adult  Goal: Absence of urinary retention  Outcome: Progressing     Problem: Infection - Adult  Goal: Absence of infection at discharge  Outcome: Progressing     Problem: Infection - Adult  Goal: Absence of infection during hospitalization  Outcome: Progressing     Problem: Infection - Adult  Goal: Absence of fever/infection during anticipated neutropenic period  Outcome: Progressing     
  Problem: Chronic Conditions and Co-morbidities  Goal: Patient's chronic conditions and co-morbidity symptoms are monitored and maintained or improved  Outcome: Progressing     Problem: Discharge Planning  Goal: Discharge to home or other facility with appropriate resources  Outcome: Progressing  Flowsheets (Taken 2/27/2025 2143)  Discharge to home or other facility with appropriate resources: Refer to discharge planning if patient needs post-hospital services based on physician order or complex needs related to functional status, cognitive ability or social support system     Problem: Safety - Adult  Goal: Free from fall injury  Outcome: Progressing     Problem: ABCDS Injury Assessment  Goal: Absence of physical injury  Outcome: Progressing     
  Problem: Chronic Conditions and Co-morbidities  Goal: Patient's chronic conditions and co-morbidity symptoms are monitored and maintained or improved  Outcome: Progressing     Problem: Discharge Planning  Goal: Discharge to home or other facility with appropriate resources  Outcome: Progressing  Flowsheets (Taken 2/27/2025 2143)  Discharge to home or other facility with appropriate resources: Refer to discharge planning if patient needs post-hospital services based on physician order or complex needs related to functional status, cognitive ability or social support system     Problem: Safety - Adult  Goal: Free from fall injury  Outcome: Progressing     Problem: ABCDS Injury Assessment  Goal: Absence of physical injury  Outcome: Progressing     
Progressing  3/3/2025 0119 by Javi Bear RN  Outcome: Progressing     Problem: Musculoskeletal - Adult  Goal: Return ADL status to a safe level of function  3/3/2025 0939 by Renetta Manzano RN  Outcome: Progressing  3/3/2025 0119 by Javi Bear RN  Outcome: Progressing     Problem: Gastrointestinal - Adult  Goal: Maintains adequate nutritional intake  3/3/2025 0939 by Renetta Manzano RN  Outcome: Progressing  3/3/2025 0119 by Javi Bear RN  Outcome: Progressing     Problem: Genitourinary - Adult  Goal: Absence of urinary retention  3/3/2025 0939 by Renteta Manzano RN  Outcome: Progressing  3/3/2025 0119 by Javi Bear RN  Outcome: Progressing

## 2025-03-03 NOTE — CARE COORDINATION
3/3/25 SS NOTE:   Pt admitted on 3/2 but also has [facility] approval for SNF admission when medically discharged, pt accepted at [facility], PAS via stretcher placed on a \"WILL CALL\", PASRR completed and GINA initiated, pt and nursing informed. Electronically signed by AG De La Rosa on 3/3/2025 at 9:30 AM

## 2025-03-03 NOTE — CARE COORDINATION
3/3/25  NOTE:   Contacted [facility] for [facility] confirming pt's transfer for today by PAS via stretcher @ 2:30 pm, PASRR completed and GINA initiated, pt , pt's relative Gita and her son, Mayur and nursing informed informed of transfer arrangements. Electronically signed by AG De La Rosa on 3/3/2025 at 10:57 AM

## 2025-03-03 NOTE — NURSE NAVIGATOR
HF Navigator Note:   Pt currently established in the CHF clinic. Saw pt at the bedside to review HF education, HFU appointments. HFU appointments and CHF discharge instructions placed on AVS. Patient discharging to [facility] for rehab after a fall. Note made on appointment schedule for CHF clinic.     Future Appointments   Date Time Provider Department Center   3/17/2025  9:45 AM [facility] CHF ROOM 1 [facility]   3/18/2025  9:30 AM [facility]   8/25/2025 10:30 AM Silvestre Nash MD [facility]   12/23/2025 10:00 AM Brennon Malave DO [facility] DEP

## 2025-03-05 ENCOUNTER — OUTSIDE SERVICES (OUTPATIENT)
Dept: PRIMARY CARE CLINIC | Age: 70
End: 2025-03-05

## 2025-03-05 DIAGNOSIS — E78.2 MIXED HYPERLIPIDEMIA: ICD-10-CM

## 2025-03-05 DIAGNOSIS — J44.1 CHRONIC OBSTRUCTIVE PULMONARY DISEASE WITH (ACUTE) EXACERBATION (HCC): ICD-10-CM

## 2025-03-05 DIAGNOSIS — R53.1 WEAKNESS: ICD-10-CM

## 2025-03-05 DIAGNOSIS — Z94.0 KIDNEY REPLACED BY TRANSPLANT: ICD-10-CM

## 2025-03-05 DIAGNOSIS — I95.1 ORTHOSTATIC HYPOTENSION: ICD-10-CM

## 2025-03-05 DIAGNOSIS — Z21 ASYMPTOMATIC HIV INFECTION, WITH NO HISTORY OF HIV-RELATED ILLNESS (HCC): ICD-10-CM

## 2025-03-05 DIAGNOSIS — F31.9 BIPOLAR 1 DISORDER (HCC): ICD-10-CM

## 2025-03-05 DIAGNOSIS — E03.9 ACQUIRED HYPOTHYROIDISM: ICD-10-CM

## 2025-03-05 DIAGNOSIS — I48.0 PAROXYSMAL ATRIAL FIBRILLATION (HCC): ICD-10-CM

## 2025-03-05 DIAGNOSIS — I10 PRIMARY HYPERTENSION: Primary | ICD-10-CM

## 2025-03-05 DIAGNOSIS — I50.22 CHRONIC SYSTOLIC (CONGESTIVE) HEART FAILURE (HCC): ICD-10-CM

## 2025-03-05 NOTE — PROGRESS NOTES
[facility] Hospitalist   Progress Note    Admitting Date and Time: 2/27/2025 12:19 PM  Admit Dx: Frequent falls [R29.6]  Ambulatory dysfunction [R26.2]  Unable to care for self [Z78.9]    Subjective:    Patient was admitted with Frequent falls [R29.6]  Ambulatory dysfunction [R26.2]  Unable to care for self [Z78.9]. Patient denies fever, chills, cp, sob, n/v.     apixaban  5 mg Oral BID    cefdinir  300 mg Oral Daily    cloZAPine  25 mg Oral Daily    cycloSPORINE  100 mg Oral BID    venlafaxine  50 mg Oral TID    ferrous sulfate  325 mg Oral Daily with breakfast    folic acid  1 mg Oral Daily    levothyroxine  50 mcg Oral Daily    metoprolol tartrate  12.5 mg Oral BID    predniSONE  5 mg Oral Daily    atorvastatin  20 mg Oral Nightly    sodium chloride flush  5-40 mL IntraVENous 2 times per day    mycophenolate  1,000 mg Oral BID     oxyCODONE-acetaminophen, 1 tablet, Q4H PRN  sodium chloride flush, 5-40 mL, PRN  sodium chloride, , PRN  ondansetron, 4 mg, Q8H PRN   Or  ondansetron, 4 mg, Q6H PRN  polyethylene glycol, 17 g, Daily PRN  acetaminophen, 650 mg, Q6H PRN   Or  acetaminophen, 650 mg, Q6H PRN         Objective:    BP (!) 146/64   Pulse 79   Temp 98.2 °F (36.8 °C) (Oral)   Resp 18   Ht 1.575 m (5' 2\")   Wt 98.4 kg (217 lb)   SpO2 92%   BMI 39.69 kg/m²   Skin: warm and dry, no rash or erythema  Pulmonary/Chest: clear to auscultation bilaterally- no wheezes, rales or rhonchi, normal air movement, no respiratory distress  Cardiovascular: rhythm reg at rate of 80  Abdomen: soft, non-tender, non-distended, normal bowel sounds, no masses or organomegaly  Extremities: no cyanosis, no clubbing, and 1-2+ b/l le edema with areas of bruising with likely hematomas      Recent Labs     02/27/25  1305 02/28/25  0540    138   K 4.6 4.1   CL 96* 99   CO2 23 28   BUN 46* 44*   CREATININE 1.9* 2.0*   GLUCOSE 143* 119*   CALCIUM 10.0 9.8       Recent Labs     02/27/25  1305 02/28/25  0540   WBC 
    3/3/2025 12:56 PM  Natali Wolff  [id number]    Subjective:    Having some nausea  Voiding comfortably   Urine marialuisa     Review of Systems  Constitutional: No fever or chills   Respiratory: negative for cough and hemoptysis  Cardiovascular: negative for chest pain and dyspnea  Gastrointestinal: negative for abdominal pain, diarrhea, nausea and vomiting   : See above  Derm: negative for rash and skin lesion(s)  Neurological: negative for seizures and tremors  Musculoskeletal: Negative    Psychiatric: Negative   All other reviews are negative      Scheduled Meds:   magnesium oxide  400 mg Oral BID    docusate sodium  100 mg Oral Daily    apixaban  5 mg Oral BID    cefdinir  300 mg Oral Daily    cloZAPine  25 mg Oral Daily    cycloSPORINE  100 mg Oral BID    venlafaxine  50 mg Oral TID    ferrous sulfate  325 mg Oral Daily with breakfast    folic acid  1 mg Oral Daily    levothyroxine  50 mcg Oral Daily    metoprolol tartrate  12.5 mg Oral BID    predniSONE  5 mg Oral Daily    atorvastatin  20 mg Oral Nightly    sodium chloride flush  5-40 mL IntraVENous 2 times per day    mycophenolate  1,000 mg Oral BID       Objective:  Vitals:    03/03/25 0800   BP:    Pulse:    Resp:    Temp:    SpO2: 95%         Allergies: Cipro [ciprofloxacin hcl], Macrodantin [nitrofurantoin macrocrystal], and Sulfa antibiotics    General Appearance: alert and oriented to person, place and time and in no acute distress  Skin: no rash or erythema  Head: normocephalic and atraumatic  Pulmonary/Chest: normal air movement, no respiratory distress  Abdomen: soft, non-tender, non-distended  Genitourinary: no hassan   Extremities: no cyanosis, clubbing or edema         Labs:     Recent Labs     03/03/25  0511      K 4.6      CO2 25   BUN 30*   CREATININE 1.6*   GLUCOSE 104*   CALCIUM 9.7       Lab Results   Component Value Date/Time    HGB 11.1 03/03/2025 05:11 AM    HCT 37.2 03/03/2025 05:11 AM       No results found for: 
  Physician Progress Note      PATIENT:               JOHN DIAL  CSN #:                  [id number]  :                       1955  ADMIT DATE:       2025 12:19 PM  DISCH DATE:  RESPONDING  PROVIDER #:        Miguelangel Sr DO          QUERY TEXT:    Pt admitted with generalized weakness and ambulatory dysfunction.  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 If possible, please document in progress notes   and discharge summary the clinical significance/acuity of \"Emphysematous   pyelonephritis of right ABD transplant kidney\":    The medical record reflects the following:  Risk Factors: hx renal transplant, dehydration and CECILY on admission  Clinical Indicators: Per urology consult \"CECILY, improving, Emphysematous   pyelonephritis of right ABD transplant kidney...  CT was done. MRI was reviewed. Will need outpatient eval. Check a ROMINA.\" US   Retroperitoneal:  \"Unremarkable appearance for a right lower quadrant   transplant kidney. 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          
  Physician Progress Note      PATIENT:               JOHN DIAL  CSN #:                  [id number]  :                       1955  ADMIT DATE:       2025 12:19 PM  DISCH DATE:        3/3/2025 3:13 PM  RESPONDING  PROVIDER #:        Jody Thomson DO          QUERY TEXT:    Pt admitted with generalized weakness and ambulatory dysfunction. Pt noted to   have dehydration, CECILY and leg hematomas. If possible, please document in   progress notes and discharge summary the etiology of generalized   weakness/ambulatory dysfunction.    The medical record reflects the following:  Risk Factors: hx kidney transplant, CECILY on CKD, falls  Clinical Indicators: Per H&P \"Pt states she has been feeling weak over the   last 7-10 days. Pt notes that she has decreased her fluid intake 2 weeks ago   due to decrease her having to urinate...Generalized weakness with frequent   falls...Probable dehydration.\" Per IM note 3/1 \"Orthostatic   hypotension...dehydration...Overall pt notes some improvement with the   fluids.\" Urology consult: \"CECILY, improving...\" Per ortho consult: \"The pain and   swelling to bilateral lower extremities most likely is due to the superficial   hematomas that are a result from the Eliquis...Her most significan  Treatment: Ortho consult, Urology consult, serial labs, IVF: NS at 50ml/hr   continuous, CT foot bilateral, CT tib/fib Right, pain medication: tylenol PRN,   Percocet PRN  Options provided:  -- Generalized weakness due to dehydration and CECILY from poor oral intake.   Ambulatory dysfunction due to pain from traumatic hematomas related to recent   falls and Eliquis use.  -- Generalized weakness and ambulatory dysfunction due to dehydration and CECILY   from poor oral intake.  -- Other - I will add my own diagnosis  -- Disagree - Not applicable / Not valid  -- Disagree - Clinically unable to determine / Unknown  -- Refer to Clinical Documentation Reviewer    PROVIDER RESPONSE 
 Treatment Note/Plan of Care    Room #:  0337/0337-01  Patient Name: Natali Wolff  YOB: 1955  MRN: [id number]    Date of Service: 2/28/2025     Tentative placement recommendation: Subacute Rehab  Equipment recommendation: To be determined      Evaluating Physical Therapist: Shawna Ryan, PT #[id number]      Specific Provider Orders/Date/Referring Provider :  02/27/25 1330    PT eval and treat  Start:  02/27/25 1330,   End:  02/27/25 1330,   ONE TIME,   Standing Count:  1 Occurrences,   S       Lainey Meier DO    Admitting Diagnosis:   Frequent falls [R29.6]  Ambulatory dysfunction [R26.2]  Unable to care for self [Z78.9]     for frequent falls  She fell out of her rollator today.  She is scattered bruising from different falls   Surgery: none  Visit Diagnoses         Codes    Ambulatory dysfunction    -  Primary R26.2    Frequent falls     R29.6            Patient Active Problem List   Diagnosis    Peripheral vascular disease (HCC)    Depression    Clotting disorder    Abnormal EKG    Kidney transplanted    Hypertension    Leg swelling    Lymphedema of both lower extremities    Acute gout involving toe of right foot    Thyroid disease    Acute renal failure with acute renal cortical necrosis superimposed on stage 4 chronic kidney disease (HCC)    Hypoxia    Acute metabolic encephalopathy    Paroxysmal atrial fibrillation (HCC)    Hypotension    LBBB (left bundle branch block)    History of DVT (deep vein thrombosis)    Chronic anticoagulation    Chronic obstructive pulmonary disease with (acute) exacerbation (HCC)    Severe obesity (BMI 35.0-39.9) with comorbidity    Impaired fasting glucose    Stage 3b chronic kidney disease (HCC)    Chronic deep vein thrombosis (DVT) of calf muscle vein of left lower extremity (HCC)    History of herpes encephalitis    Mixed hyperlipidemia    Acquired hypothyroidism    Bipolar 1 disorder (HCC)    Acute cystitis without hematuria    Age-related osteoporosis 
4 Eyes Skin Assessment     NAME:  Natali Wolff  YOB: 1955  MEDICAL RECORD NUMBER:  [id number]    The patient is being assessed for  Admission    I agree that at least one RN has performed a thorough Head to Toe Skin Assessment on the patient. ALL assessment sites listed below have been assessed.      Areas assessed by both nurses:    Head, Face, Ears, Shoulders, Back, Chest, Arms, Elbows, Hands, Sacrum. Buttock, Coccyx, Ischium, Legs. Feet and Heels, and Under Medical Devices         Does the Patient have a Wound? No noted wound(s)       Nile Prevention initiated by RN: No  Wound Care Orders initiated by RN: No    Pressure Injury (Stage 3,4, Unstageable, DTI, NWPT, and Complex wounds) if present, place Wound referral order by RN under : No    New Ostomies, if present place, Ostomy referral order under : No     Nurse 1 eSignature: Electronically signed by JEFFREY PASCAL RN on 2/27/25 at 10:42 PM EST    **SHARE this note so that the co-signing nurse can place an eSignature**    Nurse 2 eSignature: Electronically signed by Marianela Talamantes RN on 2/27/25 at 11:31 PM EST   
Nurse to nurse given to loida at [facility]  
OCCUPATIONAL THERAPY INITIAL EVALUATION    [facility]  [address] [place]. [place]        Date:2025                                                  Patient Name: Natali Wolff    MRN: [id number]    : 1955    Room: [place] [address]      Evaluating OT: Lanette Marion OTR/L; [id number]     Referring Provider and Specific Provider Orders/Date:      25 1330  OT eval and treat  Start:  25 1330,   End:  25 1330,   ONE TIME,   Standing Count:  1 Occurrences,   S         Lainey Meier,       Placement Recommendation: Subacute        Diagnosis:   1. Ambulatory dysfunction    2. Frequent falls         Surgery: none       Pertinent Medical History:       Past Medical History:   Diagnosis Date    Abnormal EKG     left bundle branch block    Acute cystitis without hematuria 2023    Acute cystitis without hematuria 2023    Acute exacerbation of chronic obstructive pulmonary disease (COPD) ([facility]) 2022    Acute gout involving toe of right foot 2020    Acute on chronic combined systolic (congestive) and diastolic (congestive) heart failure (HCC)     asthmatic bronchitis 2019    Bipolar disorder ([facility]) 2018    Cancer ([facility])     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    Cerebrovascular disease     Clotting disorder 1985    thrombophlebitis after c section delivery    Depression     15 years followed by dr zuluaga    Hemodialysis patient     History of blood transfusion     History of renal transplant 10/2015     in [place] Dr Oscar Wilkinson    Hyperlipidemia     Hypertension     Hypothyroidism     Hypothyroidism 2018    Leg swelling 2020    Leukocytosis 01/10/2022    Lymphedema of both lower extremities 2020    Other emphysema (HCC) 2024    Paroxysmal atrial fibrillation (HCC)     Peripheral vascular disease     Renal failure 2012    renal transplant    Sepsis ([facility])      
Spiritual Health History and Assessment/Progress Note  [facility]    Loneliness/Social Isolation,  ,  ,      Name: Natali Wolff MRN: [id number]    Age: 69 y.o.     Sex: female   Language: English   Latter-day: Community Spiritism   Unable to care for self     Date: 2/28/2025                           Spiritual Assessment began in [facility] MED SURG        Referral/Consult From: Multi-disciplinary team   Encounter Overview/Reason: Loneliness/Social Isolation  Service Provided For: Patient    Mitzy, Belief, Meaning:   Patient is connected with a mitzy tradition or spiritual practice and has beliefs or practices that help with coping during difficult times  Family/Friends No family/friends present      Importance and Influence:  Patient has spiritual/personal beliefs that influence decisions regarding their health  Family/Friends No family/friends present    Community:  Patient expresses feelings of isolation: Other: Patient related that her inability to walk has kept her from [employer] and other activities.  Family/Friends No family/friends present    Assessment and Plan of Care:     Patient Interventions include: Facilitated expression of thoughts and feelings, Engaged in theological reflection, Facilitated life review and/ or legacy, and Other:  provided a listening presence, empathy and prayer.  Family/Friends Interventions include: No family/friends present    Patient Plan of Care: Spiritual Care available upon further referral  Family/Friends Plan of Care: No family/friends present    Electronically signed by Chaplain Padmaja on 2/28/2025 at 12:09 PM    
swelling(some of it is preexisting swelling and related to bruising), will ask ortho to see pt.    Electronically signed by Darin Brown DO on 3/1/2025 at 9:20 AM    
mention mass. Urology following. U/s ordered        Reassess tomorrow. Recheck labs. Consider possibility of restarting diuretics    Electronically signed by Darin Brown, DO on 3/2/2025 at 6:56 PM    
cell carcinoma. RECOMMENDATIONS: If clinically appropriate for the patient's age, further evaluation of the right upper pole renal mass could be performed with a renal mass protocol CT or MRI of the abdomen without and with intravenous contrast.     Vascular duplex lower extremity venous right    Result Date: 2/17/2025  EXAMINATION: DUPLEX VENOUS ULTRASOUND OF THE RIGHT LOWER EXTREMITY 2/17/2025 2:10 pm       No evidence of DVT in the right lower extremity.       Social history: Patient lives alone in a apartment 9th floor   with Elevator  .   Tub shower  cousin intermittently checks on her    Equipment owned: Cane, Rollator, Tub transfer bench, and Hand held shower head,       AM-PAC Basic Mobility        AM-PAC Basic Mobility - Inpatient   How much help is needed turning from your back to your side while in a flat bed without using bedrails?: A Lot  How much help is needed moving from lying on your back to sitting on the side of a flat bed without using bedrails?: A Lot  How much help is needed moving to and from a bed to a chair?: Total  How much help is needed standing up from a chair using your arms?: Total  How much help is needed walking in hospital room?: Total  How much help is needed climbing 3-5 steps with a railing?: Total  AM-PAC Inpatient Mobility Raw Score : 8  AM-PAC Inpatient T-Scale Score : 28.52  Mobility Inpatient CMS 0-100% Score: 86.62  Mobility Inpatient CMS G-Code Modifier :     Nursing cleared patient for PT evaluation. The admitting diagnosis and active problem list as listed above have been reviewed prior to the initiation of this evaluation.    OBJECTIVE:   Initial Evaluation  Date: 2/27/2025 Treatment Date:     Short Term/ Long Term   Goals   Was pt agreeable to Eval/treatment? Yes  To be met in 3 days   Pain level   10/10  Bilateral feet     Bed Mobility  Using rails and head of bed elevated:       Rolling: Maximal assist of 1    Supine to sit: Maximal assist of 1    Sit to supine: 
Patient practiced and was instructed/facilitated in the following treatment: Patient assisted to edge of bed,   Sat edge of bed 10 minutes with Minimal progressing to supervision to increase dynamic sitting balance and activity tolerance. Pt did not want to try and stand up due to her c/o pain with both feet. Pt returned to supine, BLE's elevated on a 3 pillows and repositioned to her comfort.     Therapeutic Exercises:  not performed      At end of session, patient in bed with     call light and phone within reach,  all lines and tubes intact, nursing notified.      Patient would benefit from continued skilled Physical Therapy to improve functional independence and quality of life.         Patient's/ family goals   rehab    Time in 10:48  Time out 11:02    Total Treatment Time  14 minutes        CPT codes:    Therapeutic activities (33708)   14 minutes  1 unit(s)    Antione Parra  [facility]  LIC # [id number]

## 2025-03-10 ASSESSMENT — ENCOUNTER SYMPTOMS
EYE REDNESS: 0
SINUS PAIN: 0
EYE DISCHARGE: 0
SINUS PRESSURE: 0
SORE THROAT: 0
WHEEZING: 0
VOICE CHANGE: 0
COUGH: 0
SHORTNESS OF BREATH: 0
RHINORRHEA: 0
TROUBLE SWALLOWING: 0
EYE ITCHING: 0

## 2025-03-10 ASSESSMENT — VISUAL ACUITY: OU: 1

## 2025-03-10 NOTE — PROGRESS NOTES
TRANSPLANT NEPHROLOGY :   OUTPATIENT CLINIC NOTE      SERVICE DATE : 02/26/2025    REASON FOR VISIT/CHIEF COMPLAINT:  S/P  TRANSPLANT SURGERY  IMMUNOSUPPRESSIVE MEDICATION MANAGEMENT  BLOOD PRESSURE MANAGEMENT    HPI:    Ms. Jarad Paulino is a 69 y.o. 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      Admitted 6/1/24 at OSH for UTI, Pancreatitis and transferred to [facility]/discharged to SNF. 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 Transferred to [facility] on 6/2. ID consulted.    Patient is here for follow up s/p kidney transplant.     Patient is doing well overall. No new complaints. She hurt her ankle/foot.  Discussed XRAY and go to ER if gets worse.     Denied chest pain, SOB, RIVAS, Palpitation. Normal urination and bowel movement. Normal gait and no weakness of arms/legs. No cough, runny nose, sore throat, cold symptoms, or rash. No hearing loss. Normal vision.No problems with his sleep, mood and function. No recent infection, hospitalization, surgery or ER visits.      ROS:  Review of  14 systems was performed system by system. See HPI. Otherwise, the symptoms were negative.    PAST MEDICAL HISTORY:  Past Medical History:   Diagnosis Date    Asymptomatic human immunodeficiency virus (hiv) infection status (Multi) 04/12/2016    HIV, asymptomatic    Kidney transplant status (HHS-HCC)     Status post kidney transplant    Personal history of other infectious and parasitic diseases 04/12/2016    History of hepatitis B virus infection    Personal history of other infectious and parasitic diseases 04/12/2016    History of hepatitis C virus  infection    Postprocedural hypoparathyroidism (Multi)     Status post subtotal parathyroidectomy    Postprocedural hypothyroidism     Status post partial thyroidectomy    Urinary tract infection, site not specified 04/08/2016    Acute UTI        PAST SURGICAL HISTORY:  Past Surgical History:   Procedure Laterality Date    OTHER SURGICAL HISTORY  05/05/2016    Renal Transplant        SOCIAL HISTORY:  Social History     Socioeconomic History    Marital status:      Spouse name: Not on file    Number of children: Not on file    Years of education: Not on file    Highest education level: Not on file   Occupational History    Not on file   Tobacco Use    Smoking status: Unknown    Smokeless tobacco: Not on file   Vaping Use    Vaping status: Every Day   Substance and Sexual Activity    Alcohol use: Not on file    Drug use: Not on file    Sexual activity: Not on file   Other Topics Concern    Not on file   Social History Narrative    Not on file     Social Drivers of Health     Financial Resource Strain: Low Risk  (11/12/2024)    Received from [facility]    Overall Financial Resource Strain (CARDIA)     Difficulty of Paying Living Expenses: Not hard at all   Food Insecurity: No Food Insecurity (11/12/2024)    Received from [facility]    Hunger Vital Sign     Worried About Running Out of Food in the Last Year: Never true     Ran Out of Food in the Last Year: Never true   Transportation Needs: No Transportation Needs (11/12/2024)    Received from [facility]    PRAPARE - Transportation     Lack of Transportation (Medical): No     Lack of Transportation (Non-Medical): No   Physical Activity: Inactive (12/17/2024)    Received from [facility]    Exercise Vital Sign     Days of Exercise per Week: 0 days     Minutes of Exercise per Session: 0 min   Stress: No Stress Concern Present (10/25/2023)    Received from [facility]    Citizen of [place] of Occupational [employer] - Occupational Stress Questionnaire     Feeling of Stress : Only a little   Social Connections: Moderately Isolated (10/25/2023)    Received from [facility]    Social Connection and Isolation Panel [NHANES]     Frequency of Communication with Friends and Family: Three times a week     Frequency of Social Gatherings with Friends and Family: Twice a week     Attends [employer] Services: 1 to 4 times per year     Active Member of Clubs or Organizations: No     Attends Club or Organization Meetings: Never     Marital Status:    Intimate Partner Violence: Not on file   Housing Stability: Unknown (11/12/2024)    Received from [facility]    Housing Stability Vital Sign     Unable to Pay for Housing in the Last Year: No     Number of Times Moved in the Last Year: Not on file     Homeless in the Last Year: No       FAMILY HISTORY:  No family history on file.    MEDICATION LIST:  Current Outpatient Medications   Medication Instructions    acetaminophen (Tylenol) 325 mg tablet 2 tablets, oral, Every 6 hours PRN    apixaban (ELIQUIS) 5 mg, oral, 2 times daily    carvedilol (COREG) 6.25 mg, oral, 2 times daily    cloZAPine (CLOZARIL) 25 mg, oral, Daily    cycloSPORINE modified (NEORAL) 75 mg, oral, 2 times daily    desvenlafaxine (PRISTIQ) 100 mg, oral, Daily    ferrous sulfate 325 mg, oral, Daily with breakfast    folic acid (FOLVITE) 1 mg, oral, Daily    lactulose 20 g, oral, 2 times daily PRN    levothyroxine (SYNTHROID, LEVOXYL) 50 mcg, oral, Every morning    mycophenolate (CELLCEPT) 750 mg, oral, 2 times daily    oxyCODONE-acetaminophen (Percocet) 5-325 mg tablet 1 tablet, oral, 4 times daily PRN    predniSONE (DELTASONE) 5 mg, oral, Daily    simvastatin (ZOCOR) 40 mg, oral, Nightly    sodium chloride (Ocean) 0.65 % nasal spray 1 spray, Each  Nostril, 4 times daily PRN       ALLERGY  Allergies   Allergen Reactions    Macrodantin [Nitrofurantoin Macrocrystal] GI Upset    Sulfa (Sulfonamide Antibiotics) Rash       PHYSICAL EXAM:    Visit Vitals  /75   Pulse 90   Temp 36.6 °C (97.9 °F) (Temporal)   Wt 90.7 kg (200 lb)   SpO2 99%   BMI 36.58 kg/m²   Smoking Status Unknown   BSA 1.99 m²          Vital signs - reviewed. Acceptable BP at this office visit.   General Appearance - NAD, Good speech, oriented and alert  HEENT - Supple. Not pale. No jaundice. No cervical lymphadenopathy. Pharynx and tonsils are not injected.  CVS - RRR. Normal S1/S2. No murmur, click , rub or gallop  Lungs- clear to auscultation bilaterally  Abdomen - soft , not tender, no guarding, no rigidity. No hepatosplenomegaly. Normal bowel sounds. No masses and ascites. S/P Kidney transplant .  Transplanted kidney is not tender.   Musculoskeletal /Extremities - no edema. Full ROM. No joint tenderness.   Neuro/Psych - appropriate mood and affect. Motor power V/V all extremities. CN I -XII were grossly intact.  Skin - No visible rash      LABS:    Lab Results   Component Value Date    WBC 10.4 06/15/2024    HGB 11.1 (L) 06/15/2024    HCT 34.7 (L) 06/15/2024     06/15/2024    CHOL 108 06/04/2024    TRIG 82 06/04/2024    HDL 29.2 06/04/2024    ALT 33 06/15/2024    AST 28 06/15/2024     01/16/2025    K 4.9 01/16/2025     01/16/2025    CREATININE 1.80 01/16/2025    BUN 37 01/16/2025    CO2 25 01/16/2025    TSH 0.45 06/03/2024    INR 1.5 (H) 06/04/2024    HGBA1C 5.9 (H) 11/18/2024     par    ASSESSMENT AND PLAN:    Ms. Jarad Paulino is a 69 y.o. female  who is here for follow up s/p kidney transplant.    TRANSPLANT DATE: 10/30/2015 (Kidney)      1. ESRD S/P kidney transplant   - Creatinine last check was :  Lab Results   Component Value Date    CREATININE 1.80 01/16/2025       - Renal allograft function is stable, Cr is at baseline  UPC ratio   -Ensure adequate hydration  -  Avoid nephrotoxic medications, NSAIDs, and IV contrast.    2. Immunosuppression    -Continue current immunosuppression regimen.    3. Electrolytes  Lab Results   Component Value Date    GLUCOSE 135 01/16/2025    CALCIUM 10.4 01/16/2025     01/16/2025    K 4.9 01/16/2025    CO2 25 01/16/2025     01/16/2025    BUN 37 01/16/2025    CREATININE 1.80 01/16/2025     -Acceptable from last lab drawn    4. Hypertension  Blood Pressures         2/26/2025  1137             BP: 130/75          -Home  BP had been acceptable  -Encourage to monitor home BP  -Continue current anti hypertensive medication    5. Bone Mineral Disease/Osteoporosis  Lab Results   Component Value Date    .2 (H) 06/06/2024    CALCIUM 10.4 01/16/2025    CAION 1.40 (H) 06/15/2024    PHOS 3.0 06/15/2024    VITD25 35 06/04/2024     -check VIT D, PTH q 3 months  - Consider DEXA every 2-3 years , defer to PCP  - May consider initiation of Sensipar when PTH >300 AND Ca >8.4    6.Anemia  Lab Results   Component Value Date    WBC 10.4 06/15/2024    HGB 11.1 (L) 06/15/2024    HCT 34.7 (L) 06/15/2024     (H) 06/15/2024     06/15/2024     Lab Results   Component Value Date    IRON 69 03/30/2020    TIBC 334 03/30/2020    FERRITIN 525 (H) 03/30/2020     -asymptomatic  - Continue to monitor  -check iron studies and ferritin. Will consider AALIYAH as needed.  - No indications for blood transfusion     7.Health maintenance and vaccination  - Flu shot during flu season annually  - Cancer screening is up to date per the patient    Summary    Pt said she didn't well with cinacalcet and she stopped it.  Will recheck parathyroid hormone, calcium and vitamin D levels.  XRAY foot today  Cut back cyclosporine to 75 mg twice a day  Cut back mycophenolate to 750 mg twice a day  Rx for PT extension  Follow up with spine specialist   Blood test 1 week after reducing cyclosporine.   Routine standing lab order every month  Next follow up 3-4  months    Deidra Robb MD    Transplant Nephrology

## 2025-03-10 NOTE — PROGRESS NOTES
Visit Date: 3/5/25  Natali Wolff  1955  female 69 y.o.      Subjective:    CC: Patient presents with weakness with repeated falls. Patient presents for follow up of htn, hyperlipidemia, afib, chf, hypothyroidism .pancreatitis    HPI:  Hypertension  This is a chronic problem. The current episode started more than 1 year ago. The problem is unchanged. The problem is controlled. Pertinent negatives include no shortness of breath. There are no associated agents to hypertension. Risk factors for coronary artery disease include dyslipidemia and obesity. Treatments tried: taking medicatinos, no medication side effects. The current treatment provides mild improvement. There are no compliance problems.  Identifiable causes of hypertension include a thyroid problem.   Hyperlipidemia  This is a chronic problem. The current episode started more than 1 year ago. The problem is controlled. Recent lipid tests were reviewed and are variable. There are no known factors aggravating her hyperlipidemia. Pertinent negatives include no shortness of breath. Treatments tried: taking medications, no medication side effects. The current treatment provides mild improvement of lipids. There are no compliance problems.  Risk factors for coronary artery disease include dyslipidemia, hypertension and obesity.   Atrial Fibrillation  Presents for follow-up visit. Symptoms are negative for shortness of breath. The symptoms have been stable. Compliance problems: taking medications, no medication side effects.   Congestive Heart Failure  Presents for follow-up visit. Pertinent negatives include no shortness of breath. The symptoms have been worsening. Compliance with total regimen is 26-50%. Compliance with diet is 26-50%. Compliance with exercise is 26-50%. Compliance with medications is 26-50%.   Thyroid Problem  Presents for follow-up (hypothyroidism taking medications, no medication side effects) visit. Patient reports no anxiety.

## 2025-03-11 ENCOUNTER — OUTSIDE SERVICES (OUTPATIENT)
Dept: PRIMARY CARE CLINIC | Age: 70
End: 2025-03-11
Payer: MEDICARE

## 2025-03-11 DIAGNOSIS — E78.2 MIXED HYPERLIPIDEMIA: ICD-10-CM

## 2025-03-11 DIAGNOSIS — Z94.0 KIDNEY REPLACED BY TRANSPLANT: ICD-10-CM

## 2025-03-11 DIAGNOSIS — E03.9 ACQUIRED HYPOTHYROIDISM: ICD-10-CM

## 2025-03-11 DIAGNOSIS — I48.0 PAROXYSMAL ATRIAL FIBRILLATION (HCC): ICD-10-CM

## 2025-03-11 DIAGNOSIS — J44.1 CHRONIC OBSTRUCTIVE PULMONARY DISEASE WITH (ACUTE) EXACERBATION (HCC): ICD-10-CM

## 2025-03-11 DIAGNOSIS — I50.22 CHRONIC SYSTOLIC (CONGESTIVE) HEART FAILURE: ICD-10-CM

## 2025-03-11 DIAGNOSIS — F31.9 BIPOLAR 1 DISORDER (HCC): ICD-10-CM

## 2025-03-11 DIAGNOSIS — I10 PRIMARY HYPERTENSION: ICD-10-CM

## 2025-03-11 DIAGNOSIS — R53.1 WEAKNESS: ICD-10-CM

## 2025-03-11 DIAGNOSIS — Z21 ASYMPTOMATIC HIV INFECTION, WITH NO HISTORY OF HIV-RELATED ILLNESS (HCC): ICD-10-CM

## 2025-03-11 DIAGNOSIS — I95.1 ORTHOSTATIC HYPOTENSION: Primary | ICD-10-CM

## 2025-03-11 PROCEDURE — 99309 SBSQ NF CARE MODERATE MDM 30: CPT | Performed by: INTERNAL MEDICINE

## 2025-03-12 NOTE — PROGRESS NOTES
Abdominal:      General: Bowel sounds are normal. There is no distension.      Palpations: Abdomen is soft. There is no mass.      Tenderness: There is no abdominal tenderness. There is no guarding or rebound.      Hernia: No hernia is present.      Comments: No rigidity   Musculoskeletal:         General: Normal range of motion.      Right shoulder: Normal.      Left shoulder: Normal.      Right upper arm: Normal.      Left upper arm: Normal.      Cervical back: Normal range of motion.      Right hip: Normal.      Left hip: Normal.      Right upper leg: Normal.      Left upper leg: Normal.      Right lower leg: Normal.      Left lower leg: Normal.      Right foot: Normal.      Left foot: Normal.   Lymphadenopathy:      Comments: No palpable or visible regional lymphadenopathy   Skin:     General: Skin is warm and dry.      Findings: No bruising, ecchymosis, lesion or rash.   Neurological:      General: No focal deficit present.      Mental Status: She is alert. Mental status is at baseline.      Cranial Nerves: No cranial nerve deficit.      Deep Tendon Reflexes: Reflexes normal.   Psychiatric:         Mood and Affect: Mood and affect normal. Mood is not depressed.         Behavior: Behavior normal. Behavior is not agitated.         Cognition and Memory: Cognition and memory normal.      Comments: No apparent anxiety     Labs independently reviewed and findings consistent with   Lab Results   Component Value Date/Time    BUN 30 (H) 03/03/2025 05:11 AM    CREATININE 1.6 (H) 03/03/2025 05:11 AM    HGB 11.1 (L) 03/03/2025 05:11 AM    HCT 37.2 03/03/2025 05:11 AM    MG 1.7 03/03/2025 05:11 AM    K 4.6 03/03/2025 05:11 AM    K 4.6 10/07/2022 12:45 PM    GLUCOSE 104 (H) 03/03/2025 05:11 AM    LABA1C 5.4 02/28/2025 05:40 AM    LDL 73 11/18/2024 08:40 AM    HDL 45 11/18/2024 08:40 AM    Us kidney independently reviewed findings include severe atrophic changes for the native kidneys with multiple cysts, unremarkable

## 2025-03-17 ENCOUNTER — HOSPITAL ENCOUNTER (OUTPATIENT)
Dept: OTHER | Age: 70
Setting detail: THERAPIES SERIES
Discharge: HOME | End: 2025-03-17
Payer: MEDICARE

## 2025-03-17 VITALS — WEIGHT: 205.8 LBS | BODY MASS INDEX: 37.64 KG/M2

## 2025-03-17 LAB
ANION GAP SERPL CALCULATED.3IONS-SCNC: 10 MMOL/L (ref 7–16)
BNP SERPL-MCNC: 1714 PG/ML (ref 0–450)
BUN SERPL-MCNC: 32 MG/DL (ref 6–23)
CALCIUM SERPL-MCNC: 10.2 MG/DL (ref 8.6–10.2)
CHLORIDE SERPL-SCNC: 101 MMOL/L (ref 98–107)
CO2 SERPL-SCNC: 27 MMOL/L (ref 22–29)
CREAT SERPL-MCNC: 1.6 MG/DL (ref 0.5–1)
GFR, ESTIMATED: 36 ML/MIN/1.73M2
GLUCOSE SERPL-MCNC: 103 MG/DL (ref 74–99)
POTASSIUM SERPL-SCNC: 4 MMOL/L (ref 3.5–5)
SODIUM SERPL-SCNC: 138 MMOL/L (ref 132–146)

## 2025-03-17 PROCEDURE — 6360000002 HC RX W HCPCS: Performed by: INTERNAL MEDICINE

## 2025-03-17 PROCEDURE — 83880 ASSAY OF NATRIURETIC PEPTIDE: CPT

## 2025-03-17 PROCEDURE — 80048 BASIC METABOLIC PNL TOTAL CA: CPT

## 2025-03-17 PROCEDURE — 96374 THER/PROPH/DIAG INJ IV PUSH: CPT

## 2025-03-17 PROCEDURE — 2500000003 HC RX 250 WO HCPCS: Performed by: INTERNAL MEDICINE

## 2025-03-17 PROCEDURE — 36415 COLL VENOUS BLD VENIPUNCTURE: CPT

## 2025-03-17 PROCEDURE — 99214 OFFICE O/P EST MOD 30 MIN: CPT

## 2025-03-17 RX ORDER — SODIUM CHLORIDE 0.9 % (FLUSH) 0.9 %
10 SYRINGE (ML) INJECTION ONCE
Status: COMPLETED | OUTPATIENT
Start: 2025-03-17 | End: 2025-03-17

## 2025-03-17 RX ORDER — BUMETANIDE 0.25 MG/ML
2 INJECTION, SOLUTION INTRAMUSCULAR; INTRAVENOUS ONCE
Status: COMPLETED | OUTPATIENT
Start: 2025-03-17 | End: 2025-03-17

## 2025-03-17 RX ADMIN — BUMETANIDE 2 MG: 0.25 INJECTION INTRAMUSCULAR; INTRAVENOUS at 10:15

## 2025-03-17 RX ADMIN — SODIUM CHLORIDE, PRESERVATIVE FREE 10 ML: 5 INJECTION INTRAVENOUS at 10:15

## 2025-03-17 NOTE — PROGRESS NOTES
Called and spoke with Jane at [facility] re: labs and HFU appointment. Labs and AVS faxed to [phone] with success.  
Carl   8/25/2025 10:30 AM Silvestre Nash MD [facility]   12/23/2025 10:00 AM [facility]

## 2025-03-19 ENCOUNTER — OUTSIDE SERVICES (OUTPATIENT)
Dept: PRIMARY CARE CLINIC | Age: 70
End: 2025-03-19

## 2025-03-19 DIAGNOSIS — I50.22 CHRONIC SYSTOLIC (CONGESTIVE) HEART FAILURE: ICD-10-CM

## 2025-03-19 DIAGNOSIS — E03.9 ACQUIRED HYPOTHYROIDISM: ICD-10-CM

## 2025-03-19 DIAGNOSIS — I10 PRIMARY HYPERTENSION: ICD-10-CM

## 2025-03-19 DIAGNOSIS — Z21 ASYMPTOMATIC HIV INFECTION, WITH NO HISTORY OF HIV-RELATED ILLNESS (HCC): ICD-10-CM

## 2025-03-19 DIAGNOSIS — Z94.0 KIDNEY REPLACED BY TRANSPLANT: ICD-10-CM

## 2025-03-19 DIAGNOSIS — F31.9 BIPOLAR 1 DISORDER (HCC): ICD-10-CM

## 2025-03-19 DIAGNOSIS — I95.1 ORTHOSTATIC HYPOTENSION: Primary | ICD-10-CM

## 2025-03-19 DIAGNOSIS — I48.0 PAROXYSMAL ATRIAL FIBRILLATION (HCC): ICD-10-CM

## 2025-03-19 DIAGNOSIS — E78.2 MIXED HYPERLIPIDEMIA: ICD-10-CM

## 2025-03-19 DIAGNOSIS — R53.1 WEAKNESS: ICD-10-CM

## 2025-03-19 DIAGNOSIS — J44.1 CHRONIC OBSTRUCTIVE PULMONARY DISEASE WITH (ACUTE) EXACERBATION (HCC): ICD-10-CM

## 2025-03-20 NOTE — PROGRESS NOTES
Visit Date: 3/19/25  Natali Wolff  1955  female 69 y.o.      Subjective:    CC: Patient presents with weakness with repeated falls. Patient presents for follow up of htn, hyperlipidemia, afib, chf, hypothyroidism .pancreatitis    HPI:  Hypertension  This is a chronic problem. The current episode started more than 1 year ago. The problem is unchanged. The problem is controlled. Pertinent negatives include no shortness of breath. There are no associated agents to hypertension. Risk factors for coronary artery disease include dyslipidemia and obesity. Treatments tried: taking medicatinos, no medication side effects. The current treatment provides mild improvement. There are no compliance problems.  Identifiable causes of hypertension include a thyroid problem.   Hyperlipidemia  This is a chronic problem. The current episode started more than 1 year ago. The problem is controlled. Recent lipid tests were reviewed and are variable. There are no known factors aggravating her hyperlipidemia. Pertinent negatives include no shortness of breath. Treatments tried: taking medications, no medication side effects. The current treatment provides mild improvement of lipids. There are no compliance problems.  Risk factors for coronary artery disease include dyslipidemia, hypertension and obesity.   Atrial Fibrillation  Presents for follow-up visit. Symptoms are negative for shortness of breath. The symptoms have been stable. Compliance problems: taking medications, no medication side effects.   Congestive Heart Failure  Presents for follow-up visit. Pertinent negatives include no shortness of breath. The symptoms have been worsening. Compliance with total regimen is 26-50%. Compliance with diet is 26-50%. Compliance with exercise is 26-50%. Compliance with medications is 26-50%.   Thyroid Problem  Presents for follow-up (hypothyroidism taking medications, no medication side effects) visit. Patient reports no anxiety.

## 2025-03-21 DIAGNOSIS — Z94.0 IMMUNOSUPPRESSIVE MANAGEMENT ENCOUNTER FOLLOWING KIDNEY TRANSPLANT: ICD-10-CM

## 2025-03-21 DIAGNOSIS — Z79.899 IMMUNOSUPPRESSIVE MANAGEMENT ENCOUNTER FOLLOWING KIDNEY TRANSPLANT: ICD-10-CM
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
## 2025-03-21 DIAGNOSIS — Z94.0 KIDNEY REPLACED BY TRANSPLANT (HHS-HCC): ICD-10-CM

## 2025-03-21 DIAGNOSIS — E55.9 VITAMIN D DEFICIENCY: ICD-10-CM

## 2025-03-21 DIAGNOSIS — E21.3 HYPERPARATHYROIDISM (MULTI): ICD-10-CM

## 2025-03-24 RX ORDER — LEVOTHYROXINE SODIUM 50 UG/1
50 TABLET ORAL DAILY
Qty: 90 TABLET | Refills: 1 | Status: SHIPPED | OUTPATIENT
Start: 2025-03-24

## 2025-03-24 NOTE — TELEPHONE ENCOUNTER
Last Appointment:  12/17/2024  Future Appointments   Date Time Provider Department Center   3/31/2025  9:45 AM [facility] CHF ROOM 1 [facility] CHF [place]   8/25/2025 10:30 AM Silvestre Nash MD [facility]   12/23/2025 10:00 AM [facility] Malave DO [facility]        Requested Prescriptions      No prescriptions requested or ordered in this encounter

## 2025-03-26 ENCOUNTER — OUTSIDE SERVICES (OUTPATIENT)
Dept: PRIMARY CARE CLINIC | Age: 70
End: 2025-03-26
Payer: MEDICARE

## 2025-03-26 DIAGNOSIS — I10 PRIMARY HYPERTENSION: ICD-10-CM

## 2025-03-26 DIAGNOSIS — R53.1 WEAKNESS: Primary | ICD-10-CM

## 2025-03-26 DIAGNOSIS — Z21 ASYMPTOMATIC HIV INFECTION, WITH NO HISTORY OF HIV-RELATED ILLNESS (HCC): ICD-10-CM

## 2025-03-26 DIAGNOSIS — I48.0 PAROXYSMAL ATRIAL FIBRILLATION (HCC): ICD-10-CM

## 2025-03-26 DIAGNOSIS — I50.22 CHRONIC SYSTOLIC (CONGESTIVE) HEART FAILURE: ICD-10-CM

## 2025-03-26 DIAGNOSIS — J44.1 CHRONIC OBSTRUCTIVE PULMONARY DISEASE WITH (ACUTE) EXACERBATION (HCC): ICD-10-CM

## 2025-03-26 DIAGNOSIS — Z94.0 KIDNEY REPLACED BY TRANSPLANT: ICD-10-CM

## 2025-03-26 DIAGNOSIS — F31.9 BIPOLAR 1 DISORDER (HCC): ICD-10-CM

## 2025-03-26 DIAGNOSIS — E03.9 ACQUIRED HYPOTHYROIDISM: ICD-10-CM

## 2025-03-26 DIAGNOSIS — E78.2 MIXED HYPERLIPIDEMIA: ICD-10-CM

## 2025-03-26 PROCEDURE — 99309 SBSQ NF CARE MODERATE MDM 30: CPT | Performed by: INTERNAL MEDICINE

## 2025-03-31 ENCOUNTER — HOSPITAL ENCOUNTER (OUTPATIENT)
Dept: OTHER | Age: 70
Setting detail: THERAPIES SERIES
End: 2025-03-31
Payer: MEDICARE

## 2025-03-31 ASSESSMENT — ENCOUNTER SYMPTOMS
WHEEZING: 0
VOICE CHANGE: 0
EYE REDNESS: 0
EYE DISCHARGE: 0
COUGH: 0
SINUS PAIN: 0
TROUBLE SWALLOWING: 0
SORE THROAT: 0
EYE ITCHING: 0
SINUS PRESSURE: 0
RHINORRHEA: 0
SHORTNESS OF BREATH: 0

## 2025-03-31 ASSESSMENT — VISUAL ACUITY: OU: 1

## 2025-03-31 NOTE — PROGRESS NOTES
Visit Date: 3/26/25  Natali Wolff  1955  female 70 y.o.      Subjective:    CC: Patient presents with weakness with repeated falls. Patient presents for follow up of htn, hyperlipidemia, afib, chf, hypothyroidism .pancreatitis    HPI:  Hypertension  This is a chronic problem. The current episode started more than 1 year ago. The problem is unchanged. The problem is controlled. Pertinent negatives include no shortness of breath. There are no associated agents to hypertension. Risk factors for coronary artery disease include dyslipidemia and obesity. Treatments tried: taking medicatinos, no medication side effects. The current treatment provides mild improvement. There are no compliance problems.  Identifiable causes of hypertension include a thyroid problem.   Hyperlipidemia  This is a chronic problem. The current episode started more than 1 year ago. The problem is controlled. Recent lipid tests were reviewed and are variable. There are no known factors aggravating her hyperlipidemia. Pertinent negatives include no shortness of breath. Treatments tried: taking medications, no medication side effects. The current treatment provides mild improvement of lipids. There are no compliance problems.  Risk factors for coronary artery disease include dyslipidemia, hypertension and obesity.   Atrial Fibrillation  Presents for follow-up visit. Symptoms are negative for shortness of breath. The symptoms have been stable. Compliance problems: taking medications, no medication side effects.   Congestive Heart Failure  Presents for follow-up visit. Pertinent negatives include no shortness of breath. The symptoms have been worsening. Compliance with total regimen is 26-50%. Compliance with diet is 26-50%. Compliance with exercise is 26-50%. Compliance with medications is 26-50%.   Thyroid Problem  Presents for follow-up (hypothyroidism taking medications, no medication side effects) visit. Patient reports no anxiety.

## 2025-04-01 PROBLEM — E86.0 DEHYDRATION: Status: RESOLVED | Noted: 2025-03-02 | Resolved: 2025-04-01

## 2025-04-04 ENCOUNTER — OUTSIDE SERVICES (OUTPATIENT)
Dept: PRIMARY CARE CLINIC | Age: 70
End: 2025-04-04
Payer: MEDICARE

## 2025-04-04 DIAGNOSIS — I10 PRIMARY HYPERTENSION: ICD-10-CM

## 2025-04-04 DIAGNOSIS — I50.22 CHRONIC SYSTOLIC (CONGESTIVE) HEART FAILURE: ICD-10-CM

## 2025-04-04 DIAGNOSIS — I48.0 PAROXYSMAL ATRIAL FIBRILLATION (HCC): ICD-10-CM

## 2025-04-04 DIAGNOSIS — J44.1 CHRONIC OBSTRUCTIVE PULMONARY DISEASE WITH (ACUTE) EXACERBATION (HCC): ICD-10-CM

## 2025-04-04 DIAGNOSIS — R53.1 WEAKNESS: Primary | ICD-10-CM

## 2025-04-04 DIAGNOSIS — E78.2 MIXED HYPERLIPIDEMIA: ICD-10-CM

## 2025-04-04 DIAGNOSIS — I95.1 ORTHOSTATIC HYPOTENSION: ICD-10-CM

## 2025-04-04 DIAGNOSIS — F31.9 BIPOLAR 1 DISORDER (HCC): ICD-10-CM

## 2025-04-04 DIAGNOSIS — E03.9 ACQUIRED HYPOTHYROIDISM: ICD-10-CM

## 2025-04-04 DIAGNOSIS — Z21 ASYMPTOMATIC HIV INFECTION, WITH NO HISTORY OF HIV-RELATED ILLNESS (HCC): ICD-10-CM

## 2025-04-04 PROCEDURE — 99309 SBSQ NF CARE MODERATE MDM 30: CPT | Performed by: INTERNAL MEDICINE

## 2025-04-07 ASSESSMENT — ENCOUNTER SYMPTOMS
RHINORRHEA: 0
EYE ITCHING: 0
SINUS PAIN: 0
SHORTNESS OF BREATH: 0
WHEEZING: 0
TROUBLE SWALLOWING: 0
COUGH: 0
SINUS PRESSURE: 0
SORE THROAT: 0
EYE DISCHARGE: 0
VOICE CHANGE: 0
EYE REDNESS: 0

## 2025-04-07 ASSESSMENT — VISUAL ACUITY: OU: 1

## 2025-04-07 NOTE — PROGRESS NOTES
appearance of right lower quadrant transplant kidney , unremarkable appearance of bladder       Assessment & Plan:  1. Weakness  2. Primary hypertension  3. Chronic systolic (congestive) heart failure  4. Mixed hyperlipidemia  5. Paroxysmal atrial fibrillation (HCC)  6. Chronic obstructive pulmonary disease with (acute) exacerbation (HCC)  7. Acquired hypothyroidism  8. Asymptomatic HIV infection, with no history of HIV-related illness (HCC)  9. Bipolar 1 disorder (HCC)  10. Orthostatic hypotension       Chart reviewed   medications reviewed   Pt/ot working with her   Htn on metoprolol monitor blood pressure   Chf on bumex and following with the chf clinic   Hyperlipidemia on simvastatin   Hypothyroidism on levothyroxine   Afib heart rate stable on eliquis   Bipolar disorder stable on clozapine   Pt +flu A start Tamiflu 75mg PO BID x 5 days 3.31.25  Infected wound ATB therapy to continued  Monitor labs    Continue current treatment       Clementina FLORES LPN am scribing for THERESA Sun Sohair K Rostom, MD, personally performed the services described in this documentation as scribed by Clementina Galicia and it is both accurate and complete

## 2025-04-09 ENCOUNTER — OUTSIDE SERVICES (OUTPATIENT)
Dept: PRIMARY CARE CLINIC | Age: 70
End: 2025-04-09

## 2025-04-09 DIAGNOSIS — E78.2 MIXED HYPERLIPIDEMIA: ICD-10-CM

## 2025-04-09 DIAGNOSIS — E03.9 ACQUIRED HYPOTHYROIDISM: ICD-10-CM

## 2025-04-09 DIAGNOSIS — R53.1 WEAKNESS: Primary | ICD-10-CM

## 2025-04-09 DIAGNOSIS — I48.0 PAROXYSMAL ATRIAL FIBRILLATION (HCC): ICD-10-CM

## 2025-04-09 DIAGNOSIS — J44.1 CHRONIC OBSTRUCTIVE PULMONARY DISEASE WITH (ACUTE) EXACERBATION (HCC): ICD-10-CM

## 2025-04-09 DIAGNOSIS — Z21 ASYMPTOMATIC HIV INFECTION, WITH NO HISTORY OF HIV-RELATED ILLNESS (HCC): ICD-10-CM

## 2025-04-09 DIAGNOSIS — I95.1 ORTHOSTATIC HYPOTENSION: ICD-10-CM

## 2025-04-09 DIAGNOSIS — F31.9 BIPOLAR 1 DISORDER (HCC): ICD-10-CM

## 2025-04-09 DIAGNOSIS — I50.22 CHRONIC SYSTOLIC (CONGESTIVE) HEART FAILURE: ICD-10-CM

## 2025-04-09 DIAGNOSIS — I10 PRIMARY HYPERTENSION: ICD-10-CM

## 2025-04-14 ASSESSMENT — ENCOUNTER SYMPTOMS
SINUS PRESSURE: 0
VOICE CHANGE: 0
EYE ITCHING: 0
SINUS PAIN: 0
RHINORRHEA: 0
SHORTNESS OF BREATH: 0
EYE REDNESS: 0
EYE DISCHARGE: 0
WHEEZING: 0
SORE THROAT: 0
TROUBLE SWALLOWING: 0
COUGH: 0

## 2025-04-14 ASSESSMENT — VISUAL ACUITY: OU: 1

## 2025-04-14 NOTE — PROGRESS NOTES
Visit Date: 4/9/25  Natali Wolff  1955  female 70 y.o.      Subjective:    CC: Patient presents with weakness with repeated falls. Patient presents for follow up of htn, hyperlipidemia, afib, chf, hypothyroidism .pancreatitis    HPI:  Hypertension  This is a chronic problem. The current episode started more than 1 year ago. The problem is unchanged. The problem is controlled. Pertinent negatives include no shortness of breath. There are no associated agents to hypertension. Risk factors for coronary artery disease include dyslipidemia and obesity. Treatments tried: taking medicatinos, no medication side effects. The current treatment provides mild improvement. There are no compliance problems.  Identifiable causes of hypertension include a thyroid problem.   Hyperlipidemia  This is a chronic problem. The current episode started more than 1 year ago. The problem is controlled. Recent lipid tests were reviewed and are variable. There are no known factors aggravating her hyperlipidemia. Pertinent negatives include no shortness of breath. Treatments tried: taking medications, no medication side effects. The current treatment provides mild improvement of lipids. There are no compliance problems.  Risk factors for coronary artery disease include dyslipidemia, hypertension and obesity.   Atrial Fibrillation  Presents for follow-up visit. Symptoms are negative for shortness of breath. The symptoms have been stable. Compliance problems: taking medications, no medication side effects.   Congestive Heart Failure  Presents for follow-up visit. Pertinent negatives include no shortness of breath. The symptoms have been worsening. Compliance with total regimen is 26-50%. Compliance with diet is 26-50%. Compliance with exercise is 26-50%. Compliance with medications is 26-50%.   Thyroid Problem  Presents for follow-up (hypothyroidism taking medications, no medication side effects) visit. Patient reports no anxiety.

## 2025-04-15 ENCOUNTER — OFFICE VISIT (OUTPATIENT)
Dept: INTERNAL MEDICINE CLINIC | Age: 70
End: 2025-04-15

## 2025-04-15 VITALS
DIASTOLIC BLOOD PRESSURE: 82 MMHG | SYSTOLIC BLOOD PRESSURE: 136 MMHG | TEMPERATURE: 97.7 F | BODY MASS INDEX: 37.64 KG/M2 | OXYGEN SATURATION: 96 % | HEIGHT: 62 IN | HEART RATE: 101 BPM

## 2025-04-15 DIAGNOSIS — E78.2 MIXED HYPERLIPIDEMIA: ICD-10-CM

## 2025-04-15 DIAGNOSIS — E03.9 ACQUIRED HYPOTHYROIDISM: ICD-10-CM

## 2025-04-15 DIAGNOSIS — J44.1 CHRONIC OBSTRUCTIVE PULMONARY DISEASE WITH (ACUTE) EXACERBATION (HCC): ICD-10-CM

## 2025-04-15 DIAGNOSIS — Z09 HOSPITAL DISCHARGE FOLLOW-UP: Primary | ICD-10-CM

## 2025-04-15 DIAGNOSIS — N18.32 STAGE 3B CHRONIC KIDNEY DISEASE (HCC): ICD-10-CM

## 2025-04-15 DIAGNOSIS — R53.1 GENERALIZED WEAKNESS: ICD-10-CM

## 2025-04-15 DIAGNOSIS — I50.22 CHRONIC SYSTOLIC (CONGESTIVE) HEART FAILURE: ICD-10-CM

## 2025-04-15 DIAGNOSIS — F31.9 BIPOLAR 1 DISORDER (HCC): ICD-10-CM

## 2025-04-15 NOTE — PROGRESS NOTES
by mouth nightly 90 tablet 2    acetaminophen (TYLENOL) 325 MG tablet Take 2 tablets by mouth every 6 hours as needed for Pain      cycloSPORINE (SANDIMMUNE) 100 MG capsule Take 1 capsule by mouth 2 times daily (Patient taking differently: Take 75 mg by mouth 2 times daily)      desvenlafaxine succinate (PRISTIQ) 25 MG TB24 extended release tablet Take 4 tablets by mouth daily      cloZAPine (CLOZARIL) 25 MG tablet Take 1 tablet by mouth daily      ferrous sulfate (IRON 325) 325 (65 Fe) MG tablet Take 1 tablet by mouth daily (with breakfast)      mycophenolate (CELLCEPT) 500 MG tablet Take 2 tablets by mouth 2 times daily (Patient taking differently: Take 1.5 tablets by mouth 2 times daily)          Medications patient taking as of now reconciled against medications ordered at time of hospital discharge: Yes    A comprehensive review of systems was negative except for what was noted in the HPI.    Objective:    /82   Pulse (!) 101   Temp 97.7 °F (36.5 °C) (Temporal)   Ht 1.575 m (5' 2\")   SpO2 96%   BMI 37.64 kg/m²   General Appearance: alert and oriented to person, place and time, well developed and well- nourished, in no acute distress  Skin: warm and dry, no rash or erythema  Head: normocephalic and atraumatic  Eyes: pupils equal, round, and reactive to light, extraocular eye movements intact, conjunctivae normal  ENT: tympanic membrane, external ear and ear canal normal bilaterally, nose without deformity, nasal mucosa and turbinates normal without polyps  Neck: supple and non-tender without mass, no thyromegaly or thyroid nodules, no cervical lymphadenopathy  Pulmonary/Chest: clear to auscultation bilaterally- no wheezes, rales or rhonchi, normal air movement, no respiratory distress  Cardiovascular: normal rate, regular rhythm, normal S1 and S2, no murmurs, rubs, clicks, or gallops, distal pulses intact, no carotid bruits  Abdomen: soft, non-tender, non-distended, normal bowel sounds, no masses or

## 2025-04-17 LAB
ALBUMIN: 4.2 G/DL (ref 3.5–5.2)
ANION GAP SERPL CALCULATED.3IONS-SCNC: 14 MMOL/L (ref 7–16)
BASOPHILS # BLD: 0 K/UL (ref 0–0.2)
BASOPHILS NFR BLD: 0 % (ref 0–2)
BUN SERPL-MCNC: 26 MG/DL (ref 8–23)
CALCIUM SERPL-MCNC: 10.1 MG/DL (ref 8.8–10.2)
CHLORIDE SERPL-SCNC: 103 MMOL/L (ref 98–107)
CO2 SERPL-SCNC: 25 MMOL/L (ref 22–29)
CREAT SERPL-MCNC: 1.5 MG/DL (ref 0.5–1)
CREAT UR-MCNC: 91.8 MG/DL (ref 29–226)
EOSINOPHIL # BLD: 0 K/UL (ref 0.05–0.5)
EOSINOPHILS RELATIVE PERCENT: 0 % (ref 0–6)
ERYTHROCYTE [DISTWIDTH] IN BLOOD BY AUTOMATED COUNT: 14.9 % (ref 11.5–15)
GFR, ESTIMATED: 37 ML/MIN/1.73M2
GLUCOSE SERPL-MCNC: 217 MG/DL (ref 74–99)
HCT VFR BLD AUTO: 39.4 % (ref 34–48)
HGB BLD-MCNC: 11.5 G/DL (ref 11.5–15.5)
LYMPHOCYTES NFR BLD: 0.32 K/UL (ref 1.5–4)
LYMPHOCYTES RELATIVE PERCENT: 4 % (ref 20–42)
MAGNESIUM SERPL-MCNC: 1.5 MG/DL (ref 1.6–2.4)
MCH RBC QN AUTO: 30.6 PG (ref 26–35)
MCHC RBC AUTO-ENTMCNC: 29.2 G/DL (ref 32–34.5)
MCV RBC AUTO: 104.8 FL (ref 80–99.9)
MONOCYTES NFR BLD: 0.48 K/UL (ref 0.1–0.95)
MONOCYTES NFR BLD: 5 % (ref 2–12)
NEUTROPHILS NFR BLD: 91 % (ref 43–80)
NEUTS SEG NFR BLD: 8.4 K/UL (ref 1.8–7.3)
PHOSPHATE SERPL-MCNC: 2.7 MG/DL (ref 2.5–4.5)
PLATELET, FLUORESCENCE: 153 K/UL (ref 130–450)
PMV BLD AUTO: 14.3 FL (ref 7–12)
POTASSIUM SERPL-SCNC: 3.9 MMOL/L (ref 3.5–5.1)
RBC # BLD AUTO: 3.76 M/UL (ref 3.5–5.5)
RBC # BLD: NORMAL 10*6/UL
SODIUM SERPL-SCNC: 142 MMOL/L (ref 136–145)
TOTAL PROTEIN, URINE: 24 MG/DL (ref 0–12)
URINE TOTAL PROTEIN CREATININE RATIO: 0.26 (ref 0–0.2)
WBC OTHER # BLD: 9.2 K/UL (ref 4.5–11.5)

## 2025-04-18 DIAGNOSIS — Z94.0 IMMUNOSUPPRESSIVE MANAGEMENT ENCOUNTER FOLLOWING KIDNEY TRANSPLANT: ICD-10-CM

## 2025-04-18 DIAGNOSIS — E21.3 HYPERPARATHYROIDISM (MULTI): ICD-10-CM

## 2025-04-18 DIAGNOSIS — E55.9 VITAMIN D DEFICIENCY: ICD-10-CM

## 2025-04-18 DIAGNOSIS — Z94.0 KIDNEY REPLACED BY TRANSPLANT (HHS-HCC): ICD-10-CM

## 2025-04-18 DIAGNOSIS — Z79.899 IMMUNOSUPPRESSIVE MANAGEMENT ENCOUNTER FOLLOWING KIDNEY TRANSPLANT: ICD-10-CM
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
## 2025-04-18 LAB — 25(OH)D3 SERPL-MCNC: 38.2 NG/ML (ref 30–100)

## 2025-04-21 LAB — CYCLOSPORINE BLD-MCNC: 64 UG/L (ref 50–300)

## 2025-04-22 ENCOUNTER — TELEPHONE (OUTPATIENT)
Dept: FAMILY MEDICINE CLINIC | Age: 70
End: 2025-04-22

## 2025-04-22 LAB — PTH-INTACT SERPL-MCNC: 186 PG/ML (ref 15–65)

## 2025-04-22 NOTE — TELEPHONE ENCOUNTER
Ana with [facility] called she wants Dr Malave to know pt took at fall yesterday at home, she did call EMS, no real injuries cut on toe, JESICA

## 2025-05-01 ENCOUNTER — HOSPITAL ENCOUNTER (OUTPATIENT)
Dept: OTHER | Age: 70
Setting detail: THERAPIES SERIES
End: 2025-05-01
Payer: MEDICARE

## 2025-05-07 ENCOUNTER — TELEPHONE (OUTPATIENT)
Dept: OTHER | Age: 70
End: 2025-05-07

## 2025-05-07 ENCOUNTER — HOSPITAL ENCOUNTER (OUTPATIENT)
Dept: OTHER | Age: 70
Setting detail: THERAPIES SERIES
Discharge: HOME | End: 2025-05-07
Payer: MEDICARE

## 2025-05-07 ENCOUNTER — RESULTS FOLLOW-UP (OUTPATIENT)
Age: 70
End: 2025-05-07

## 2025-05-07 VITALS
HEART RATE: 85 BPM | BODY MASS INDEX: 38.34 KG/M2 | SYSTOLIC BLOOD PRESSURE: 130 MMHG | DIASTOLIC BLOOD PRESSURE: 58 MMHG | WEIGHT: 209.6 LBS | OXYGEN SATURATION: 93 %

## 2025-05-07 LAB
ANION GAP SERPL CALCULATED.3IONS-SCNC: 15 MMOL/L (ref 7–16)
BNP SERPL-MCNC: 1155 PG/ML (ref 0–450)
BUN SERPL-MCNC: 31 MG/DL (ref 6–23)
CALCIUM SERPL-MCNC: 10.1 MG/DL (ref 8.6–10.2)
CHLORIDE SERPL-SCNC: 102 MMOL/L (ref 98–107)
CO2 SERPL-SCNC: 20 MMOL/L (ref 22–29)
CREAT SERPL-MCNC: 1.7 MG/DL (ref 0.5–1)
GFR, ESTIMATED: 32 ML/MIN/1.73M2
GLUCOSE SERPL-MCNC: 115 MG/DL (ref 74–99)
POTASSIUM SERPL-SCNC: 4.1 MMOL/L (ref 3.5–5)
SODIUM SERPL-SCNC: 137 MMOL/L (ref 132–146)

## 2025-05-07 PROCEDURE — 99214 OFFICE O/P EST MOD 30 MIN: CPT

## 2025-05-07 PROCEDURE — 36415 COLL VENOUS BLD VENIPUNCTURE: CPT

## 2025-05-07 PROCEDURE — 80048 BASIC METABOLIC PNL TOTAL CA: CPT

## 2025-05-07 PROCEDURE — 83880 ASSAY OF NATRIURETIC PEPTIDE: CPT

## 2025-05-07 ASSESSMENT — PATIENT HEALTH QUESTIONNAIRE - PHQ9
SUM OF ALL RESPONSES TO PHQ QUESTIONS 1-9: 1
SUM OF ALL RESPONSES TO PHQ QUESTIONS 1-9: 1
1. LITTLE INTEREST OR PLEASURE IN DOING THINGS: SEVERAL DAYS
SUM OF ALL RESPONSES TO PHQ QUESTIONS 1-9: 1
2. FEELING DOWN, DEPRESSED OR HOPELESS: NOT AT ALL
SUM OF ALL RESPONSES TO PHQ QUESTIONS 1-9: 1

## 2025-05-07 NOTE — RESULT ENCOUNTER NOTE
Called and reviewed provider instructions with patient.  Start Jardiance 10mg daily.  Educated about side effects and s/s to be aware of.  She will  prescription tomorrow.  Follow up in CHF clinic as scheduled- sooner if needed please call     I have reviewed the providers instructions with the patient, answering all questions to their satisfaction    Future Appointments   Date Time Provider Department Center   5/15/2025  1:00 PM Brennon Malave DO [facility] DEP   5/20/2025  9:00 AM [facility] CHF ROOM 3 [facility]   8/25/2025 10:30 AM Silvestre Nash MD [facility]   12/23/2025 10:00 [facility]       Current Events

## 2025-05-07 NOTE — RESULT ENCOUNTER NOTE
Labs and CHF clinic note reviewed  Start Jardiance 10 mg daily   Follow up in CHF clinic as scheduled on 5/20 - sooner if needed please call     Thank you

## 2025-05-07 NOTE — PROGRESS NOTES
Congestive Heart Failure Clinic   [facility]     Referring Provider: Dr. Renteria  Primary Care Physician: Brennon Malave DO   Cardiologist: Dr. Nash  Nephrologist: Dr. Latham   Transplant: Dr. Terrance Membreno ( standing order for blood work q 3 months.)     HISTORY OF PRESENT ILLNESS:     Natali Wolff is a 70 y.o. (1955) female with a history of HFpEF (EF> 50%), most recent EF:  Lab Results   Component Value Date    LVEF 55 07/24/2023    LVEFMODE Echo 07/24/2023     She presents to the CHF clinic for ongoing evaluation and monitoring of heart failure.    In the CHF clinic today she denies any adverse symptoms except:  [] Dizziness or lightheadedness   [] Syncope or near syncope  [] Recent Fall  [] Shortness of breath at rest   [] Dyspnea with exertion  [] Decline in functional capacity (unable to perform activities they had previously been able to do)  [] Fatigue   [] Orthopnea  [] PND  [] Nocturnal cough  [] Hemoptysis  [] Chest pain, pressure, or discomfort  [] Palpitations   [] Abdominal distention  [] Abdominal bloating  [] Early satiety  [] Blood in stool   [] Diarrhea  [] Constipation  [] Nausea/Vomiting  [] Decreased urinary response to oral diuretic   [] Scrotal swelling   [x] Lower extremity edema RIGHT >LEFT  [] Used PRN doses of oral diuretic    Weight gain    Wt Readings from Last 3 Encounters:   05/07/25 95.1 kg (209 lb 9.6 oz)   03/17/25 93.4 kg (205 lb 12.8 oz)   03/03/25 101.5 kg (223 lb 11.2 oz)       SOCIAL HISTORY:  [x] Denies tobacco, alcohol or illicit drug abuse  [] Tobacco use:  [] ETOH use:  [] Illicit drug use:        MEDICATIONS:    Allergies   Allergen Reactions    Cipro [Ciprofloxacin Hcl]     Macrodantin [Nitrofurantoin Macrocrystal] Nausea Only    Sulfa Antibiotics Nausea Only     Prior to Visit Medications    Medication Sig Taking? Authorizing Provider   levothyroxine (SYNTHROID) 50 MCG tablet Take 1 tablet by mouth

## 2025-05-08 ENCOUNTER — TELEPHONE (OUTPATIENT)
Dept: OTHER | Age: 70
End: 2025-05-08

## 2025-05-15 ENCOUNTER — OFFICE VISIT (OUTPATIENT)
Dept: INTERNAL MEDICINE CLINIC | Age: 70
End: 2025-05-15

## 2025-05-15 VITALS
HEART RATE: 89 BPM | HEIGHT: 62 IN | SYSTOLIC BLOOD PRESSURE: 122 MMHG | DIASTOLIC BLOOD PRESSURE: 76 MMHG | TEMPERATURE: 97.8 F | WEIGHT: 210 LBS | OXYGEN SATURATION: 97 % | BODY MASS INDEX: 38.64 KG/M2

## 2025-05-15 DIAGNOSIS — E78.2 MIXED HYPERLIPIDEMIA: ICD-10-CM

## 2025-05-15 DIAGNOSIS — J44.1 CHRONIC OBSTRUCTIVE PULMONARY DISEASE WITH (ACUTE) EXACERBATION (HCC): ICD-10-CM

## 2025-05-15 DIAGNOSIS — I50.22 CHRONIC SYSTOLIC (CONGESTIVE) HEART FAILURE (HCC): ICD-10-CM

## 2025-05-15 DIAGNOSIS — Z79.2 PREVENTIVE ANTIBIOTIC: ICD-10-CM

## 2025-05-15 DIAGNOSIS — N18.32 STAGE 3B CHRONIC KIDNEY DISEASE (HCC): ICD-10-CM

## 2025-05-15 DIAGNOSIS — E03.9 ACQUIRED HYPOTHYROIDISM: ICD-10-CM

## 2025-05-15 DIAGNOSIS — R53.1 GENERALIZED WEAKNESS: Primary | ICD-10-CM

## 2025-05-15 PROBLEM — M10.9 ACUTE GOUT INVOLVING TOE OF RIGHT FOOT: Status: RESOLVED | Noted: 2020-09-03 | Resolved: 2025-05-15

## 2025-05-15 PROBLEM — K85.90 ACUTE PANCREATITIS: Status: RESOLVED | Noted: 2024-06-01 | Resolved: 2025-05-15

## 2025-05-15 PROBLEM — R09.02 HYPOXIA: Status: RESOLVED | Noted: 2022-02-25 | Resolved: 2025-05-15

## 2025-05-15 RX ORDER — CEFDINIR 300 MG/1
300 CAPSULE ORAL DAILY
Status: ON HOLD | COMMUNITY

## 2025-05-15 ASSESSMENT — ENCOUNTER SYMPTOMS
CONSTIPATION: 0
CHEST TIGHTNESS: 0
COUGH: 0
WHEEZING: 0
ABDOMINAL PAIN: 0
DIARRHEA: 0
VOMITING: 0
SHORTNESS OF BREATH: 0
ANAL BLEEDING: 0
NAUSEA: 0
CHOKING: 0
BACK PAIN: 0

## 2025-05-15 NOTE — PROGRESS NOTES
improvement in symptoms post-injection.  - Follow-up in 3 months.    Results  Labs   - BNP: 05/07/2025, 1155   - A1c: 02/28/2025, 5.4  1. Generalized weakness  2. Chronic obstructive pulmonary disease with (acute) exacerbation (HCC)  3. Mixed hyperlipidemia  4. Stage 3b chronic kidney disease (HCC)  5. Acquired hypothyroidism  6. Chronic systolic (congestive) heart failure (HCC)  7. Preventive antibiotic  Comments:  Given Omnicef 300mg once a day for UTI prevention, has been on the antibiotic for 3-4 months.    Return in about 3 months (around 8/15/2025) for to monitor medical conditions.       Subjective   History of Present Illness  The patient is a 70-year-old female who presents today for a 1-month follow-up on her hospital stay. She was admitted for generalized weakness and dehydration, during which she received IV fluids. She is still having PT sessions at her home twice a week and is visited by nurses from [facility] once a week.    She reports feeling well overall, with no complaints of chest pain or shortness of breath. Her sleep pattern is normal. She has been attending the CHF clinic every 2 weeks, with her last visit being 2 weeks ago. During that visit, she was prescribed Jardiance 10 mg, which she has been tolerating well. She has an upcoming appointment at the CHF clinic on 05/20/2025 and will have blood work done on 05/19/2025.    She has chronic kidney disease stage 3 and is under the care of Dr. Latham, whom she sees every 3 months. Her next appointment with him is scheduled for 06/03/2025.    She has an appointment with her transplant doctor on 06/11/2025 and with Dr. Damian on 06/12/2025.    She has been experiencing leg swelling, particularly in her feet, but reports no history of injury or procedures on her right ankle or knee. She occasionally experiences pain radiating down her leg due to a pinched nerve, and her toes have curled under while walking on a few occasions. She was previously

## 2025-05-16 DIAGNOSIS — Z79.899 IMMUNOSUPPRESSIVE MANAGEMENT ENCOUNTER FOLLOWING KIDNEY TRANSPLANT: ICD-10-CM

## 2025-05-16 DIAGNOSIS — E21.3 HYPERPARATHYROIDISM (MULTI): ICD-10-CM

## 2025-05-16 DIAGNOSIS — E55.9 VITAMIN D DEFICIENCY: ICD-10-CM

## 2025-05-16 DIAGNOSIS — Z94.0 IMMUNOSUPPRESSIVE MANAGEMENT ENCOUNTER FOLLOWING KIDNEY TRANSPLANT: ICD-10-CM

## 2025-05-16 DIAGNOSIS — Z94.0 KIDNEY TRANSPLANTED: ICD-10-CM

## 2025-05-16 DIAGNOSIS — Z94.0 KIDNEY REPLACED BY TRANSPLANT (HHS-HCC): ICD-10-CM
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
## 2025-05-16 RX ORDER — FOLIC ACID 1 MG/1
1 TABLET ORAL DAILY
Qty: 90 TABLET | Refills: 1 | Status: ON HOLD | OUTPATIENT
Start: 2025-05-16

## 2025-05-16 NOTE — TELEPHONE ENCOUNTER
Name of Medication(s) Requested:  Requested Prescriptions     Pending Prescriptions Disp Refills    folic acid (FOLVITE) 1 MG tablet 90 tablet 1     Sig: Take 1 tablet by mouth daily       Medication is on current medication list Yes    Dosage and directions were verified? Yes    Quantity verified: 90 day supply     Pharmacy Verified?  Yes    Last Appointment:  5/15/2025    Future appts:  Future Appointments   Date Time Provider Department Center   5/20/2025  9:00 AM [facility] ROOM 3 [facility]   8/14/2025 12:30 PM [facility]   8/25/2025 10:30 AM Silvestre Nash MD [facility]   12/23/2025 10:00 [facility]        (If no appt send self scheduling link. .REFILLAPPT)  Scheduling request sent?     [] Yes  [x] No    Does patient need updated?  [] Yes  [x] No

## 2025-05-18 ENCOUNTER — APPOINTMENT (OUTPATIENT)
Dept: GENERAL RADIOLOGY | Age: 70
DRG: 579 | End: 2025-05-18
Payer: MEDICARE

## 2025-05-18 ENCOUNTER — APPOINTMENT (OUTPATIENT)
Dept: CT IMAGING | Age: 70
DRG: 579 | End: 2025-05-18
Payer: MEDICARE

## 2025-05-18 ENCOUNTER — HOSPITAL ENCOUNTER (INPATIENT)
Age: 70
LOS: 4 days | Discharge: NURSING, REHAB OR LONG-TERM CARE | DRG: 579 | End: 2025-05-22
Attending: EMERGENCY MEDICINE | Admitting: INTERNAL MEDICINE
Payer: MEDICARE

## 2025-05-18 DIAGNOSIS — S89.92XA INJURY OF LEFT LOWER EXTREMITY, INITIAL ENCOUNTER: ICD-10-CM

## 2025-05-18 DIAGNOSIS — I50.9 ACUTE DECOMPENSATED HEART FAILURE (HCC): ICD-10-CM
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
## 2025-05-18 DIAGNOSIS — S80.12XA HEMATOMA OF LEFT LOWER EXTREMITY, INITIAL ENCOUNTER: ICD-10-CM

## 2025-05-18 LAB
ANION GAP SERPL CALCULATED.3IONS-SCNC: 12 MMOL/L (ref 7–16)
BASOPHILS # BLD: 0.06 K/UL (ref 0–0.2)
BASOPHILS NFR BLD: 1 % (ref 0–2)
BUN SERPL-MCNC: 29 MG/DL (ref 6–23)
CALCIUM SERPL-MCNC: 9.8 MG/DL (ref 8.6–10.2)
CHLORIDE SERPL-SCNC: 99 MMOL/L (ref 98–107)
CK SERPL-CCNC: 68 U/L (ref 20–180)
CO2 SERPL-SCNC: 21 MMOL/L (ref 22–29)
CREAT SERPL-MCNC: 1.7 MG/DL (ref 0.5–1)
EOSINOPHIL # BLD: 0.09 K/UL (ref 0.05–0.5)
EOSINOPHILS RELATIVE PERCENT: 1 % (ref 0–6)
ERYTHROCYTE [DISTWIDTH] IN BLOOD BY AUTOMATED COUNT: 14.7 % (ref 11.5–15)
GFR, ESTIMATED: 33 ML/MIN/1.73M2
GLUCOSE SERPL-MCNC: 130 MG/DL (ref 74–99)
HCT VFR BLD AUTO: 33.3 % (ref 34–48)
HCT VFR BLD AUTO: 38.3 % (ref 34–48)
HGB BLD-MCNC: 10 G/DL (ref 11.5–15.5)
HGB BLD-MCNC: 11.3 G/DL (ref 11.5–15.5)
IMM GRANULOCYTES # BLD AUTO: 0.07 K/UL (ref 0–0.58)
IMM GRANULOCYTES NFR BLD: 1 % (ref 0–5)
LYMPHOCYTES NFR BLD: 0.82 K/UL (ref 1.5–4)
LYMPHOCYTES RELATIVE PERCENT: 7 % (ref 20–42)
MCH RBC QN AUTO: 31 PG (ref 26–35)
MCHC RBC AUTO-ENTMCNC: 29.5 G/DL (ref 32–34.5)
MCV RBC AUTO: 104.9 FL (ref 80–99.9)
MONOCYTES NFR BLD: 1.03 K/UL (ref 0.1–0.95)
MONOCYTES NFR BLD: 8 % (ref 2–12)
NEUTROPHILS NFR BLD: 83 % (ref 43–80)
NEUTS SEG NFR BLD: 10.15 K/UL (ref 1.8–7.3)
PLATELET, FLUORESCENCE: 139 K/UL (ref 130–450)
PMV BLD AUTO: 13.1 FL (ref 7–12)
POTASSIUM SERPL-SCNC: 4.2 MMOL/L (ref 3.5–5)
RBC # BLD AUTO: 3.65 M/UL (ref 3.5–5.5)
SODIUM SERPL-SCNC: 132 MMOL/L (ref 132–146)
WBC OTHER # BLD: 12.2 K/UL (ref 4.5–11.5)

## 2025-05-18 PROCEDURE — 73701 CT LOWER EXTREMITY W/DYE: CPT

## 2025-05-18 PROCEDURE — 2500000003 HC RX 250 WO HCPCS: Performed by: INTERNAL MEDICINE

## 2025-05-18 PROCEDURE — 99285 EMERGENCY DEPT VISIT HI MDM: CPT

## 2025-05-18 PROCEDURE — 73590 X-RAY EXAM OF LOWER LEG: CPT

## 2025-05-18 PROCEDURE — 6360000002 HC RX W HCPCS: Performed by: INTERNAL MEDICINE

## 2025-05-18 PROCEDURE — 86901 BLOOD TYPING SEROLOGIC RH(D): CPT

## 2025-05-18 PROCEDURE — 2060000000 HC ICU INTERMEDIATE R&B

## 2025-05-18 PROCEDURE — 6360000004 HC RX CONTRAST MEDICATION: Performed by: RADIOLOGY

## 2025-05-18 PROCEDURE — 85025 COMPLETE CBC W/AUTO DIFF WBC: CPT

## 2025-05-18 PROCEDURE — 80048 BASIC METABOLIC PNL TOTAL CA: CPT

## 2025-05-18 PROCEDURE — 86923 COMPATIBILITY TEST ELECTRIC: CPT

## 2025-05-18 PROCEDURE — 86900 BLOOD TYPING SEROLOGIC ABO: CPT

## 2025-05-18 PROCEDURE — 99223 1ST HOSP IP/OBS HIGH 75: CPT | Performed by: INTERNAL MEDICINE

## 2025-05-18 PROCEDURE — 73562 X-RAY EXAM OF KNEE 3: CPT

## 2025-05-18 PROCEDURE — 6370000000 HC RX 637 (ALT 250 FOR IP): Performed by: INTERNAL MEDICINE

## 2025-05-18 PROCEDURE — 6370000000 HC RX 637 (ALT 250 FOR IP)

## 2025-05-18 PROCEDURE — 82550 ASSAY OF CK (CPK): CPT

## 2025-05-18 PROCEDURE — 85014 HEMATOCRIT: CPT

## 2025-05-18 PROCEDURE — 36415 COLL VENOUS BLD VENIPUNCTURE: CPT

## 2025-05-18 PROCEDURE — 2580000003 HC RX 258

## 2025-05-18 PROCEDURE — 6360000002 HC RX W HCPCS

## 2025-05-18 PROCEDURE — 86850 RBC ANTIBODY SCREEN: CPT

## 2025-05-18 PROCEDURE — 85018 HEMOGLOBIN: CPT

## 2025-05-18 RX ORDER — SODIUM CHLORIDE 9 MG/ML
50 INJECTION, SOLUTION INTRAVENOUS ONCE
Status: COMPLETED | OUTPATIENT
Start: 2025-05-18 | End: 2025-05-18

## 2025-05-18 RX ORDER — MYCOPHENOLATE MOFETIL 250 MG/1
1000 CAPSULE ORAL 2 TIMES DAILY
Status: DISCONTINUED | OUTPATIENT
Start: 2025-05-18 | End: 2025-05-22 | Stop reason: HOSPADM

## 2025-05-18 RX ORDER — CLOZAPINE 25 MG/1
25 TABLET ORAL DAILY
Status: DISCONTINUED | OUTPATIENT
Start: 2025-05-18 | End: 2025-05-20

## 2025-05-18 RX ORDER — FERROUS SULFATE 325(65) MG
325 TABLET ORAL
Status: DISCONTINUED | OUTPATIENT
Start: 2025-05-19 | End: 2025-05-22 | Stop reason: HOSPADM

## 2025-05-18 RX ORDER — METOPROLOL TARTRATE 25 MG/1
12.5 TABLET, FILM COATED ORAL 2 TIMES DAILY
Status: DISCONTINUED | OUTPATIENT
Start: 2025-05-18 | End: 2025-05-22 | Stop reason: HOSPADM

## 2025-05-18 RX ORDER — SODIUM CHLORIDE 0.9 % (FLUSH) 0.9 %
5-40 SYRINGE (ML) INJECTION EVERY 12 HOURS SCHEDULED
Status: DISCONTINUED | OUTPATIENT
Start: 2025-05-18 | End: 2025-05-22 | Stop reason: HOSPADM

## 2025-05-18 RX ORDER — IOPAMIDOL 755 MG/ML
75 INJECTION, SOLUTION INTRAVASCULAR
Status: COMPLETED | OUTPATIENT
Start: 2025-05-18 | End: 2025-05-18

## 2025-05-18 RX ORDER — ACETAMINOPHEN 325 MG/1
650 TABLET ORAL EVERY 6 HOURS PRN
Status: DISCONTINUED | OUTPATIENT
Start: 2025-05-18 | End: 2025-05-22 | Stop reason: HOSPADM

## 2025-05-18 RX ORDER — SODIUM CHLORIDE 9 MG/ML
INJECTION, SOLUTION INTRAVENOUS
Status: COMPLETED
Start: 2025-05-18 | End: 2025-05-18

## 2025-05-18 RX ORDER — HYDROCODONE BITARTRATE AND ACETAMINOPHEN 5; 325 MG/1; MG/1
1 TABLET ORAL ONCE
Status: COMPLETED | OUTPATIENT
Start: 2025-05-18 | End: 2025-05-18

## 2025-05-18 RX ORDER — VENLAFAXINE HYDROCHLORIDE 150 MG/1
150 CAPSULE, EXTENDED RELEASE ORAL
Status: DISCONTINUED | OUTPATIENT
Start: 2025-05-19 | End: 2025-05-22 | Stop reason: HOSPADM

## 2025-05-18 RX ORDER — POTASSIUM CHLORIDE 1500 MG/1
40 TABLET, EXTENDED RELEASE ORAL PRN
Status: DISCONTINUED | OUTPATIENT
Start: 2025-05-18 | End: 2025-05-22 | Stop reason: HOSPADM

## 2025-05-18 RX ORDER — FOLIC ACID 1 MG/1
1 TABLET ORAL DAILY
Status: DISCONTINUED | OUTPATIENT
Start: 2025-05-19 | End: 2025-05-22 | Stop reason: HOSPADM

## 2025-05-18 RX ORDER — MAGNESIUM SULFATE IN WATER 40 MG/ML
2000 INJECTION, SOLUTION INTRAVENOUS PRN
Status: DISCONTINUED | OUTPATIENT
Start: 2025-05-18 | End: 2025-05-22 | Stop reason: HOSPADM

## 2025-05-18 RX ORDER — CYCLOSPORINE 25 MG/1
100 CAPSULE, GELATIN COATED ORAL 2 TIMES DAILY
Status: DISCONTINUED | OUTPATIENT
Start: 2025-05-18 | End: 2025-05-22 | Stop reason: HOSPADM

## 2025-05-18 RX ORDER — ONDANSETRON 2 MG/ML
4 INJECTION INTRAMUSCULAR; INTRAVENOUS EVERY 6 HOURS PRN
Status: DISCONTINUED | OUTPATIENT
Start: 2025-05-18 | End: 2025-05-22 | Stop reason: HOSPADM

## 2025-05-18 RX ORDER — SODIUM CHLORIDE 0.9 % (FLUSH) 0.9 %
5-40 SYRINGE (ML) INJECTION PRN
Status: DISCONTINUED | OUTPATIENT
Start: 2025-05-18 | End: 2025-05-22 | Stop reason: HOSPADM

## 2025-05-18 RX ORDER — ATORVASTATIN CALCIUM 20 MG/1
20 TABLET, FILM COATED ORAL DAILY
Status: DISCONTINUED | OUTPATIENT
Start: 2025-05-18 | End: 2025-05-22 | Stop reason: HOSPADM

## 2025-05-18 RX ORDER — POLYETHYLENE GLYCOL 3350 17 G/17G
17 POWDER, FOR SOLUTION ORAL DAILY PRN
Status: DISCONTINUED | OUTPATIENT
Start: 2025-05-18 | End: 2025-05-22 | Stop reason: HOSPADM

## 2025-05-18 RX ORDER — SODIUM CHLORIDE 9 MG/ML
INJECTION, SOLUTION INTRAVENOUS PRN
Status: DISCONTINUED | OUTPATIENT
Start: 2025-05-18 | End: 2025-05-22 | Stop reason: HOSPADM

## 2025-05-18 RX ORDER — PREDNISONE 5 MG/1
5 TABLET ORAL DAILY
Status: DISCONTINUED | OUTPATIENT
Start: 2025-05-19 | End: 2025-05-22 | Stop reason: HOSPADM

## 2025-05-18 RX ORDER — ACETAMINOPHEN 650 MG/1
650 SUPPOSITORY RECTAL EVERY 6 HOURS PRN
Status: DISCONTINUED | OUTPATIENT
Start: 2025-05-18 | End: 2025-05-22 | Stop reason: HOSPADM

## 2025-05-18 RX ORDER — ONDANSETRON 4 MG/1
4 TABLET, ORALLY DISINTEGRATING ORAL EVERY 8 HOURS PRN
Status: DISCONTINUED | OUTPATIENT
Start: 2025-05-18 | End: 2025-05-22 | Stop reason: HOSPADM

## 2025-05-18 RX ORDER — LEVOTHYROXINE SODIUM 50 UG/1
50 TABLET ORAL DAILY
Status: DISCONTINUED | OUTPATIENT
Start: 2025-05-19 | End: 2025-05-22 | Stop reason: HOSPADM

## 2025-05-18 RX ORDER — POTASSIUM CHLORIDE 7.45 MG/ML
10 INJECTION INTRAVENOUS PRN
Status: DISCONTINUED | OUTPATIENT
Start: 2025-05-18 | End: 2025-05-22 | Stop reason: HOSPADM

## 2025-05-18 RX ADMIN — SODIUM CHLORIDE 50 ML: 9 INJECTION, SOLUTION INTRAVENOUS at 18:42

## 2025-05-18 RX ADMIN — CYCLOSPORINE 100 MG: 25 CAPSULE, GELATIN COATED ORAL at 21:18

## 2025-05-18 RX ADMIN — HYDROCODONE BITARTRATE AND ACETAMINOPHEN 1 TABLET: 5; 325 TABLET ORAL at 18:35

## 2025-05-18 RX ADMIN — HYDROMORPHONE HYDROCHLORIDE 0.5 MG: 1 INJECTION, SOLUTION INTRAMUSCULAR; INTRAVENOUS; SUBCUTANEOUS at 20:56

## 2025-05-18 RX ADMIN — HYDROCODONE BITARTRATE AND ACETAMINOPHEN 1 TABLET: 5; 325 TABLET ORAL at 14:45

## 2025-05-18 RX ADMIN — SODIUM CHLORIDE, PRESERVATIVE FREE 10 ML: 5 INJECTION INTRAVENOUS at 21:18

## 2025-05-18 RX ADMIN — CLOZAPINE 25 MG: 25 TABLET ORAL at 21:18

## 2025-05-18 RX ADMIN — METOPROLOL TARTRATE 12.5 MG: 25 TABLET, FILM COATED ORAL at 21:15

## 2025-05-18 RX ADMIN — PROTHROMBIN COMPLEX CONCENTRATE (HUMAN) 2000 UNITS: 25.5; 16.5; 24; 22; 22; 26 POWDER, FOR SOLUTION INTRAVENOUS at 18:33

## 2025-05-18 RX ADMIN — ATORVASTATIN CALCIUM 20 MG: 20 TABLET, FILM COATED ORAL at 21:15

## 2025-05-18 RX ADMIN — HYDROCODONE BITARTRATE AND ACETAMINOPHEN 1 TABLET: 5; 325 TABLET ORAL at 11:08

## 2025-05-18 RX ADMIN — IOPAMIDOL 75 ML: 755 INJECTION, SOLUTION INTRAVENOUS at 13:45

## 2025-05-18 RX ADMIN — MYCOPHENOLATE MOFETIL 1000 MG: 250 CAPSULE ORAL at 21:17

## 2025-05-18 ASSESSMENT — PAIN DESCRIPTION - ORIENTATION
ORIENTATION: LEFT
ORIENTATION: LEFT

## 2025-05-18 ASSESSMENT — PAIN DESCRIPTION - LOCATION
LOCATION: LEG
LOCATION: LEG

## 2025-05-18 ASSESSMENT — PAIN SCALES - GENERAL
PAINLEVEL_OUTOF10: 10

## 2025-05-18 ASSESSMENT — PAIN - FUNCTIONAL ASSESSMENT: PAIN_FUNCTIONAL_ASSESSMENT: ACTIVITIES ARE NOT PREVENTED

## 2025-05-18 ASSESSMENT — PAIN DESCRIPTION - DESCRIPTORS: DESCRIPTORS: BURNING

## 2025-05-18 NOTE — ED PROVIDER NOTES
HYDROcodone-acetaminophen (NORCO) 5-325 MG per tablet 1 tablet (1 tablet Oral Given 5/18/25 1445)   prothrombin complex human-lans (BALFAXAR) infusion 2,000 Units (0 Units IntraVENous Stopped 5/18/25 1842)     Followed by   0.9 % sodium chloride infusion (0 mLs IntraVENous Stopped 5/18/25 1908)   HYDROcodone-acetaminophen (NORCO) 5-325 MG per tablet 1 tablet (1 tablet Oral Given 5/18/25 1835)       Labs reviewed and interpreted by me:  Results for orders placed or performed during the hospital encounter of 05/18/25   CBC with Auto Differential   Result Value Ref Range    WBC 12.2 (H) 4.5 - 11.5 k/uL    RBC 3.65 3.50 - 5.50 m/uL    Hemoglobin 11.3 (L) 11.5 - 15.5 g/dL    Hematocrit 38.3 34.0 - 48.0 %    .9 (H) 80.0 - 99.9 fL    MCH 31.0 26.0 - 35.0 pg    MCHC 29.5 (L) 32.0 - 34.5 g/dL    RDW 14.7 11.5 - 15.0 %    MPV 13.1 (H) 7.0 - 12.0 fL    Platelet, Fluorescence 139 130 - 450 k/uL    Neutrophils % 83 (H) 43.0 - 80.0 %    Lymphocytes % 7 (L) 20.0 - 42.0 %    Monocytes % 8 2.0 - 12.0 %    Eosinophils % 1 0 - 6 %    Basophils % 1 0.0 - 2.0 %    Immature Granulocytes % 1 0.0 - 5.0 %    Neutrophils Absolute 10.15 (H) 1.80 - 7.30 k/uL    Lymphocytes Absolute 0.82 (L) 1.50 - 4.00 k/uL    Monocytes Absolute 1.03 (H) 0.10 - 0.95 k/uL    Eosinophils Absolute 0.09 0.05 - 0.50 k/uL    Basophils Absolute 0.06 0.00 - 0.20 k/uL    Immature Granulocytes Absolute 0.07 0.00 - 0.58 k/uL   BMP   Result Value Ref Range    Sodium 132 132 - 146 mmol/L    Potassium 4.2 3.5 - 5.0 mmol/L    Chloride 99 98 - 107 mmol/L    CO2 21 (L) 22 - 29 mmol/L    Anion Gap 12 7 - 16 mmol/L    Glucose 130 (H) 74 - 99 mg/dL    BUN 29 (H) 6 - 23 mg/dL    Creatinine 1.7 (H) 0.50 - 1.00 mg/dL    Est, Glom Filt Rate 33 (L) >60 mL/min/1.73m2    Calcium 9.8 8.6 - 10.2 mg/dL   CK   Result Value Ref Range    Total CK 68 20 - 180 U/L   Hemoglobin and Hematocrit   Result Value Ref Range    Hemoglobin 10.0 (L) 11.5 - 15.5 g/dL    Hematocrit 33.3 (L) 34.0 -

## 2025-05-18 NOTE — ED NOTES
Pt took two steps with the walker and stated she couldn't go any further. Pt normally lives at home and ambulates with a walker

## 2025-05-19 ENCOUNTER — ANESTHESIA EVENT (OUTPATIENT)
Dept: OPERATING ROOM | Age: 70
DRG: 579 | End: 2025-05-19
Payer: MEDICARE

## 2025-05-19 ENCOUNTER — ANESTHESIA (OUTPATIENT)
Dept: OPERATING ROOM | Age: 70
DRG: 579 | End: 2025-05-19
Payer: MEDICARE
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
## 2025-05-19 LAB
ANION GAP SERPL CALCULATED.3IONS-SCNC: 11 MMOL/L (ref 7–16)
BASOPHILS # BLD: 0.04 K/UL (ref 0–0.2)
BASOPHILS NFR BLD: 0 % (ref 0–2)
BUN SERPL-MCNC: 27 MG/DL (ref 6–23)
CALCIUM SERPL-MCNC: 9.5 MG/DL (ref 8.6–10.2)
CHLORIDE SERPL-SCNC: 104 MMOL/L (ref 98–107)
CO2 SERPL-SCNC: 25 MMOL/L (ref 22–29)
CREAT SERPL-MCNC: 1.6 MG/DL (ref 0.5–1)
EOSINOPHIL # BLD: 0.1 K/UL (ref 0.05–0.5)
EOSINOPHILS RELATIVE PERCENT: 1 % (ref 0–6)
ERYTHROCYTE [DISTWIDTH] IN BLOOD BY AUTOMATED COUNT: 14.9 % (ref 11.5–15)
GFR, ESTIMATED: 35 ML/MIN/1.73M2
GLUCOSE SERPL-MCNC: 127 MG/DL (ref 74–99)
HCT VFR BLD AUTO: 32.1 % (ref 34–48)
HGB BLD-MCNC: 9.4 G/DL (ref 11.5–15.5)
IMM GRANULOCYTES # BLD AUTO: 0.04 K/UL (ref 0–0.58)
IMM GRANULOCYTES NFR BLD: 0 % (ref 0–5)
INR PPP: 1.1
LYMPHOCYTES NFR BLD: 1.21 K/UL (ref 1.5–4)
LYMPHOCYTES RELATIVE PERCENT: 13 % (ref 20–42)
MCH RBC QN AUTO: 30.5 PG (ref 26–35)
MCHC RBC AUTO-ENTMCNC: 29.3 G/DL (ref 32–34.5)
MCV RBC AUTO: 104.2 FL (ref 80–99.9)
MONOCYTES NFR BLD: 0.92 K/UL (ref 0.1–0.95)
MONOCYTES NFR BLD: 10 % (ref 2–12)
NEUTROPHILS NFR BLD: 76 % (ref 43–80)
NEUTS SEG NFR BLD: 7.16 K/UL (ref 1.8–7.3)
PLATELET, FLUORESCENCE: 132 K/UL (ref 130–450)
PMV BLD AUTO: 13.4 FL (ref 7–12)
POTASSIUM SERPL-SCNC: 4.2 MMOL/L (ref 3.5–5)
PROTHROMBIN TIME: 12.2 SEC (ref 9.3–12.4)
RBC # BLD AUTO: 3.08 M/UL (ref 3.5–5.5)
SODIUM SERPL-SCNC: 140 MMOL/L (ref 132–146)
WBC OTHER # BLD: 9.5 K/UL (ref 4.5–11.5)

## 2025-05-19 PROCEDURE — 3600000002 HC SURGERY LEVEL 2 BASE: Performed by: ORTHOPAEDIC SURGERY

## 2025-05-19 PROCEDURE — 6370000000 HC RX 637 (ALT 250 FOR IP): Performed by: ANESTHESIOLOGY

## 2025-05-19 PROCEDURE — 2580000003 HC RX 258: Performed by: INTERNAL MEDICINE

## 2025-05-19 PROCEDURE — 6370000000 HC RX 637 (ALT 250 FOR IP): Performed by: ORTHOPAEDIC SURGERY

## 2025-05-19 PROCEDURE — 87075 CULTR BACTERIA EXCEPT BLOOD: CPT

## 2025-05-19 PROCEDURE — 7100000001 HC PACU RECOVERY - ADDTL 15 MIN: Performed by: ORTHOPAEDIC SURGERY

## 2025-05-19 PROCEDURE — 6360000002 HC RX W HCPCS: Performed by: ANESTHESIOLOGY

## 2025-05-19 PROCEDURE — 36415 COLL VENOUS BLD VENIPUNCTURE: CPT

## 2025-05-19 PROCEDURE — 3600000012 HC SURGERY LEVEL 2 ADDTL 15MIN: Performed by: ORTHOPAEDIC SURGERY

## 2025-05-19 PROCEDURE — 80048 BASIC METABOLIC PNL TOTAL CA: CPT

## 2025-05-19 PROCEDURE — 87205 SMEAR GRAM STAIN: CPT

## 2025-05-19 PROCEDURE — 87116 MYCOBACTERIA CULTURE: CPT

## 2025-05-19 PROCEDURE — 2500000003 HC RX 250 WO HCPCS: Performed by: ORTHOPAEDIC SURGERY

## 2025-05-19 PROCEDURE — 87102 FUNGUS ISOLATION CULTURE: CPT

## 2025-05-19 PROCEDURE — 2500000003 HC RX 250 WO HCPCS: Performed by: INTERNAL MEDICINE

## 2025-05-19 PROCEDURE — 3700000000 HC ANESTHESIA ATTENDED CARE: Performed by: ORTHOPAEDIC SURGERY

## 2025-05-19 PROCEDURE — 2060000000 HC ICU INTERMEDIATE R&B

## 2025-05-19 PROCEDURE — 99233 SBSQ HOSP IP/OBS HIGH 50: CPT | Performed by: INTERNAL MEDICINE

## 2025-05-19 PROCEDURE — 87015 SPECIMEN INFECT AGNT CONCNTJ: CPT

## 2025-05-19 PROCEDURE — 6360000002 HC RX W HCPCS

## 2025-05-19 PROCEDURE — 6360000002 HC RX W HCPCS: Performed by: ORTHOPAEDIC SURGERY

## 2025-05-19 PROCEDURE — 3700000001 HC ADD 15 MINUTES (ANESTHESIA): Performed by: ORTHOPAEDIC SURGERY

## 2025-05-19 PROCEDURE — 85610 PROTHROMBIN TIME: CPT

## 2025-05-19 PROCEDURE — 6360000002 HC RX W HCPCS: Performed by: INTERNAL MEDICINE

## 2025-05-19 PROCEDURE — 7100000000 HC PACU RECOVERY - FIRST 15 MIN: Performed by: ORTHOPAEDIC SURGERY

## 2025-05-19 PROCEDURE — 87206 SMEAR FLUORESCENT/ACID STAI: CPT

## 2025-05-19 PROCEDURE — 85025 COMPLETE CBC W/AUTO DIFF WBC: CPT

## 2025-05-19 PROCEDURE — 0KCT0ZZ EXTIRPATION OF MATTER FROM LEFT LOWER LEG MUSCLE, OPEN APPROACH: ICD-10-PCS | Performed by: ORTHOPAEDIC SURGERY

## 2025-05-19 PROCEDURE — 2580000003 HC RX 258: Performed by: NURSE ANESTHETIST, CERTIFIED REGISTERED

## 2025-05-19 PROCEDURE — 87176 TISSUE HOMOGENIZATION CULTR: CPT

## 2025-05-19 PROCEDURE — 2709999900 HC NON-CHARGEABLE SUPPLY: Performed by: ORTHOPAEDIC SURGERY

## 2025-05-19 PROCEDURE — 6360000002 HC RX W HCPCS: Performed by: NURSE ANESTHETIST, CERTIFIED REGISTERED

## 2025-05-19 PROCEDURE — 87070 CULTURE OTHR SPECIMN AEROBIC: CPT

## 2025-05-19 RX ORDER — SODIUM CHLORIDE, SODIUM LACTATE, POTASSIUM CHLORIDE, CALCIUM CHLORIDE 600; 310; 30; 20 MG/100ML; MG/100ML; MG/100ML; MG/100ML
INJECTION, SOLUTION INTRAVENOUS CONTINUOUS
Status: DISCONTINUED | OUTPATIENT
Start: 2025-05-19 | End: 2025-05-20

## 2025-05-19 RX ORDER — CEFAZOLIN SODIUM 1 G/3ML
INJECTION, POWDER, FOR SOLUTION INTRAMUSCULAR; INTRAVENOUS
Status: DISCONTINUED
Start: 2025-05-19 | End: 2025-05-19

## 2025-05-19 RX ORDER — NALOXONE HYDROCHLORIDE 0.4 MG/ML
INJECTION, SOLUTION INTRAMUSCULAR; INTRAVENOUS; SUBCUTANEOUS PRN
Status: DISCONTINUED | OUTPATIENT
Start: 2025-05-19 | End: 2025-05-19 | Stop reason: HOSPADM

## 2025-05-19 RX ORDER — MIDAZOLAM HYDROCHLORIDE 1 MG/ML
2 INJECTION, SOLUTION INTRAMUSCULAR; INTRAVENOUS
Status: DISCONTINUED | OUTPATIENT
Start: 2025-05-19 | End: 2025-05-19 | Stop reason: HOSPADM

## 2025-05-19 RX ORDER — DIPHENHYDRAMINE HYDROCHLORIDE 50 MG/ML
12.5 INJECTION, SOLUTION INTRAMUSCULAR; INTRAVENOUS
Status: DISCONTINUED | OUTPATIENT
Start: 2025-05-19 | End: 2025-05-19 | Stop reason: HOSPADM

## 2025-05-19 RX ORDER — SODIUM CHLORIDE 0.9 % (FLUSH) 0.9 %
5-40 SYRINGE (ML) INJECTION PRN
Status: DISCONTINUED | OUTPATIENT
Start: 2025-05-19 | End: 2025-05-19 | Stop reason: HOSPADM

## 2025-05-19 RX ORDER — PROPOFOL 10 MG/ML
INJECTION, EMULSION INTRAVENOUS
Status: DISCONTINUED | OUTPATIENT
Start: 2025-05-19 | End: 2025-05-19 | Stop reason: SDUPTHER

## 2025-05-19 RX ORDER — SODIUM CHLORIDE 9 MG/ML
INJECTION, SOLUTION INTRAVENOUS PRN
Status: DISCONTINUED | OUTPATIENT
Start: 2025-05-19 | End: 2025-05-19 | Stop reason: HOSPADM

## 2025-05-19 RX ORDER — FENTANYL CITRATE 0.05 MG/ML
25 INJECTION, SOLUTION INTRAMUSCULAR; INTRAVENOUS EVERY 5 MIN PRN
Status: DISCONTINUED | OUTPATIENT
Start: 2025-05-19 | End: 2025-05-19 | Stop reason: HOSPADM

## 2025-05-19 RX ORDER — LABETALOL HYDROCHLORIDE 5 MG/ML
10 INJECTION, SOLUTION INTRAVENOUS
Status: DISCONTINUED | OUTPATIENT
Start: 2025-05-19 | End: 2025-05-19 | Stop reason: HOSPADM

## 2025-05-19 RX ORDER — PROCHLORPERAZINE EDISYLATE 5 MG/ML
5 INJECTION INTRAMUSCULAR; INTRAVENOUS
Status: DISCONTINUED | OUTPATIENT
Start: 2025-05-19 | End: 2025-05-19 | Stop reason: HOSPADM

## 2025-05-19 RX ORDER — DROPERIDOL 2.5 MG/ML
0.62 INJECTION, SOLUTION INTRAMUSCULAR; INTRAVENOUS
Status: DISCONTINUED | OUTPATIENT
Start: 2025-05-19 | End: 2025-05-19 | Stop reason: HOSPADM

## 2025-05-19 RX ORDER — HYDRALAZINE HYDROCHLORIDE 20 MG/ML
10 INJECTION INTRAMUSCULAR; INTRAVENOUS
Status: DISCONTINUED | OUTPATIENT
Start: 2025-05-19 | End: 2025-05-19 | Stop reason: HOSPADM

## 2025-05-19 RX ORDER — IPRATROPIUM BROMIDE AND ALBUTEROL SULFATE 2.5; .5 MG/3ML; MG/3ML
1 SOLUTION RESPIRATORY (INHALATION)
Status: COMPLETED | OUTPATIENT
Start: 2025-05-19 | End: 2025-05-19

## 2025-05-19 RX ORDER — ALBUTEROL SULFATE 0.83 MG/ML
SOLUTION RESPIRATORY (INHALATION)
Status: COMPLETED
Start: 2025-05-19 | End: 2025-05-19

## 2025-05-19 RX ORDER — SODIUM CHLORIDE, SODIUM LACTATE, POTASSIUM CHLORIDE, CALCIUM CHLORIDE 600; 310; 30; 20 MG/100ML; MG/100ML; MG/100ML; MG/100ML
INJECTION, SOLUTION INTRAVENOUS
Status: DISCONTINUED | OUTPATIENT
Start: 2025-05-19 | End: 2025-05-19 | Stop reason: SDUPTHER

## 2025-05-19 RX ORDER — FENTANYL CITRATE 50 UG/ML
INJECTION, SOLUTION INTRAMUSCULAR; INTRAVENOUS
Status: DISCONTINUED | OUTPATIENT
Start: 2025-05-19 | End: 2025-05-19 | Stop reason: SDUPTHER

## 2025-05-19 RX ORDER — SODIUM CHLORIDE 0.9 % (FLUSH) 0.9 %
5-40 SYRINGE (ML) INJECTION EVERY 12 HOURS SCHEDULED
Status: DISCONTINUED | OUTPATIENT
Start: 2025-05-19 | End: 2025-05-19 | Stop reason: HOSPADM

## 2025-05-19 RX ORDER — ALBUTEROL SULFATE 0.83 MG/ML
2.5 SOLUTION RESPIRATORY (INHALATION) ONCE
Status: COMPLETED | OUTPATIENT
Start: 2025-05-19 | End: 2025-05-19

## 2025-05-19 RX ADMIN — SODIUM CHLORIDE, PRESERVATIVE FREE 10 ML: 5 INJECTION INTRAVENOUS at 09:01

## 2025-05-19 RX ADMIN — CEFAZOLIN SODIUM 2000 MG: 1 POWDER, FOR SOLUTION INTRAMUSCULAR; INTRAVENOUS at 13:36

## 2025-05-19 RX ADMIN — ALBUTEROL SULFATE 2.5 MG: 0.83 SOLUTION RESPIRATORY (INHALATION) at 13:16

## 2025-05-19 RX ADMIN — CLOZAPINE 25 MG: 25 TABLET ORAL at 17:04

## 2025-05-19 RX ADMIN — CYCLOSPORINE 100 MG: 25 CAPSULE, GELATIN COATED ORAL at 20:40

## 2025-05-19 RX ADMIN — HYDROMORPHONE HYDROCHLORIDE 0.5 MG: 1 INJECTION, SOLUTION INTRAMUSCULAR; INTRAVENOUS; SUBCUTANEOUS at 15:14

## 2025-05-19 RX ADMIN — HYDROMORPHONE HYDROCHLORIDE 0.5 MG: 1 INJECTION, SOLUTION INTRAMUSCULAR; INTRAVENOUS; SUBCUTANEOUS at 09:00

## 2025-05-19 RX ADMIN — PROPOFOL INJECTABLE EMULSION 30 MG: 10 INJECTION, EMULSION INTRAVENOUS at 13:50

## 2025-05-19 RX ADMIN — PROPOFOL INJECTABLE EMULSION 100 MCG/KG/MIN: 10 INJECTION, EMULSION INTRAVENOUS at 13:43

## 2025-05-19 RX ADMIN — MYCOPHENOLATE MOFETIL 1000 MG: 250 CAPSULE ORAL at 20:40

## 2025-05-19 RX ADMIN — HYDROMORPHONE HYDROCHLORIDE 0.5 MG: 1 INJECTION, SOLUTION INTRAMUSCULAR; INTRAVENOUS; SUBCUTANEOUS at 14:57

## 2025-05-19 RX ADMIN — HYDROMORPHONE HYDROCHLORIDE 0.5 MG: 1 INJECTION, SOLUTION INTRAMUSCULAR; INTRAVENOUS; SUBCUTANEOUS at 00:02

## 2025-05-19 RX ADMIN — LEVOTHYROXINE SODIUM 50 MCG: 0.05 TABLET ORAL at 17:03

## 2025-05-19 RX ADMIN — SODIUM CHLORIDE, PRESERVATIVE FREE 10 ML: 5 INJECTION INTRAVENOUS at 20:45

## 2025-05-19 RX ADMIN — HYDROMORPHONE HYDROCHLORIDE 0.5 MG: 1 INJECTION, SOLUTION INTRAMUSCULAR; INTRAVENOUS; SUBCUTANEOUS at 14:41

## 2025-05-19 RX ADMIN — METOPROLOL TARTRATE 12.5 MG: 25 TABLET, FILM COATED ORAL at 20:40

## 2025-05-19 RX ADMIN — SODIUM CHLORIDE, POTASSIUM CHLORIDE, SODIUM LACTATE AND CALCIUM CHLORIDE: 600; 310; 30; 20 INJECTION, SOLUTION INTRAVENOUS at 13:36

## 2025-05-19 RX ADMIN — FENTANYL CITRATE 50 MCG: 50 INJECTION, SOLUTION INTRAMUSCULAR; INTRAVENOUS at 13:52

## 2025-05-19 RX ADMIN — HYDROMORPHONE HYDROCHLORIDE 0.5 MG: 1 INJECTION, SOLUTION INTRAMUSCULAR; INTRAVENOUS; SUBCUTANEOUS at 20:45

## 2025-05-19 RX ADMIN — ATORVASTATIN CALCIUM 20 MG: 20 TABLET, FILM COATED ORAL at 17:04

## 2025-05-19 RX ADMIN — SODIUM CHLORIDE, SODIUM LACTATE, POTASSIUM CHLORIDE, AND CALCIUM CHLORIDE 100 ML: .6; .31; .03; .02 INJECTION, SOLUTION INTRAVENOUS at 17:12

## 2025-05-19 RX ADMIN — PREDNISONE 5 MG: 5 TABLET ORAL at 17:04

## 2025-05-19 RX ADMIN — FENTANYL CITRATE 50 MCG: 50 INJECTION, SOLUTION INTRAMUSCULAR; INTRAVENOUS at 13:42

## 2025-05-19 RX ADMIN — ALBUTEROL SULFATE 2.5 MG: 2.5 SOLUTION RESPIRATORY (INHALATION) at 13:16

## 2025-05-19 RX ADMIN — PROPOFOL INJECTABLE EMULSION 100 MG: 10 INJECTION, EMULSION INTRAVENOUS at 13:42

## 2025-05-19 RX ADMIN — HYDROMORPHONE HYDROCHLORIDE 0.5 MG: 1 INJECTION, SOLUTION INTRAMUSCULAR; INTRAVENOUS; SUBCUTANEOUS at 04:16

## 2025-05-19 RX ADMIN — IPRATROPIUM BROMIDE AND ALBUTEROL SULFATE 1 DOSE: 2.5; .5 SOLUTION RESPIRATORY (INHALATION) at 14:47

## 2025-05-19 RX ADMIN — FOLIC ACID 1 MG: 1 TABLET ORAL at 17:04

## 2025-05-19 ASSESSMENT — PAIN SCALES - GENERAL
PAINLEVEL_OUTOF10: 6
PAINLEVEL_OUTOF10: 10
PAINLEVEL_OUTOF10: 7
PAINLEVEL_OUTOF10: 10
PAINLEVEL_OUTOF10: 8
PAINLEVEL_OUTOF10: 0
PAINLEVEL_OUTOF10: 7
PAINLEVEL_OUTOF10: 10

## 2025-05-19 ASSESSMENT — PAIN DESCRIPTION - ORIENTATION
ORIENTATION: LEFT

## 2025-05-19 ASSESSMENT — PAIN - FUNCTIONAL ASSESSMENT
PAIN_FUNCTIONAL_ASSESSMENT: ACTIVITIES ARE NOT PREVENTED
PAIN_FUNCTIONAL_ASSESSMENT: ACTIVITIES ARE NOT PREVENTED

## 2025-05-19 ASSESSMENT — PAIN DESCRIPTION - LOCATION
LOCATION: LEG

## 2025-05-19 ASSESSMENT — PAIN DESCRIPTION - DESCRIPTORS
DESCRIPTORS: ACHING
DESCRIPTORS: ACHING;BURNING
DESCRIPTORS: BURNING

## 2025-05-19 ASSESSMENT — PAIN DESCRIPTION - PAIN TYPE: TYPE: ACUTE PAIN;SURGICAL PAIN

## 2025-05-19 NOTE — CARE COORDINATION
SOCIAL WORK / DISCHARGE PLANNING:  Pt out of room to OR for procedure. Sw will complete full assessment when pt is available. Per [place], [facility] rep, pt is active with them for skilled nursing and therapy. If to return home, WILL NEED [facility] ORDER. Medical record review, resides in [facility], elevator accessible, has rollater, ww, cane and shower chair. She has home O2, unsure of supplier. [facility]. [facility], [facility] as well as recently in [facility]. Sw will follow for further dc plan development.               Electronically signed by AG Vera on 5/19/2025 at 1:47 PM

## 2025-05-19 NOTE — OP NOTE
Operative Note      Patient: Natali Wolff  YOB: 1955  MRN: [id number]    Date of Procedure: 5/19/2025    Preoperative diagnosis  Hematoma of left lower extremity    Post-Op Diagnosis: Same       Procedure(s):  EVACUATION HEMATOMA LEFT LEG 17 x 16 cm with wound VAC application    Surgeon(s):  Goldy Renner MD    Assistant:   First Assistant: Dequan Leigh    Anesthesia: Monitor Anesthesia Care    Estimated Blood Loss (mL): less than 50     Complications: None    Specimens:   ID Type Source Tests Collected by Time Destination   1 : LEFT LOWER EXTREMITY HEMATOMA Tissue Tissue CULTURE, ANAEROBIC, CULTURE, FUNGUS, GRAM STAIN (Canceled), CULTURE, SURGICAL, CULTURE WITH SMEAR, ACID FAST BACILLIUS Goldy Renner MD 5/19/2025 1412        Implants:  * No implants in log *      Drains: * No LDAs found *    Findings:  Infection Present At Time Of Surgery (PATOS) (choose all levels that have infection present):  No infection present  Other Findings:     Detailed Description of Procedure:   Patient brought the operating placed supine position on the operating table table the skin was extremity that was already tearing of the distal portion of the hematoma area in the lateral pretibial region.  The leg was prepped draped usual sterile fashion we extended the tearing up using knife dissection proximally there was a very large and tense hematoma the skin was extremely thin with no subcutaneous fat connected of the distal portion where it is torn.  We manually removed any hematoma proximally and then used pulsatile lavage removing anything more it did go around proximately to the medial side of the tibia and into the muscle slightly anteriorly.  There was any active bleeding it was coagulated.  So at this point the skin was so thin distally it could not be closed so we used vertical mattress sutures with PDS then nylon to close the very proximal portion and then we applied a wound VAC we laid the

## 2025-05-19 NOTE — H&P
technique.  Severe tricompartment DJD with chondrocalcinosis and femorotibial joint space narrowing grossly symmetric appearance of joint space loss without acute irregularity tibial plateaus preserved.  Trace to small suprapatellar joint effusion. AP and lateral views of the tibia and fibula without evidence for acute fracture or osseous malalignment.  Cortical margins intact with diffuse body wall edema.  Visualized ankle mortise symmetric.     1. No acute fracture or dislocation of the left knee or tibia/fibula. 2. Severe tricompartment DJD of the left knee. 3. Trace to small suprapatellar joint effusion. 4. Diffuse body wall edema.       ASSESSMENT and PLAN:    Large left tib-fib hematoma  Mechanical fall at home  Ambulatory dysfunction  Chronic Macrocytic anemia  History ESRD s/p left fistula  S/p renal transplant in 2015  Hx paroxysmal A-fib on Eliquis  Hypothyroidism on HRT  Peripheral vascular disease  Hypertension  Hyperlipidemia  COPD w/o exacerbation  HFpEF w/o exacerbation  Anxiety/depression, bipolar  Chronic thrombocytopenia      -CT left leg showed large hematoma anterior, anterior lateral left tib-fib  -Hematoma measuring 11 x 5 x 17.8 cm, concern for active bleeding  -Diffuse subcutaneous edema LLE, no acute infection/wound. Pain control,   -Chronically on Eliquis, H&H stable, neurovascularly intact, received Balfaxar  -Orthopedic surgery consulted, plan for potential hematoma evacuation in AM  -Neurochecks, pulse checks, hold anticoagulation, fall precautions, H&H  -History of renal transplant, resume CellCept, prednisone, cyclosporine  -Avoid nephrotoxins, renal dose meds, repeat renal function, Nephro follow-up  -Resume folate, levothyroxine, metoprolol, statin therapy, Pristiq, Clozaril,     Diet: ADULT DIET; Regular; Low Fat/Low Chol/High Fiber/NAIN; Low Sodium (2 gm)  Diet NPO  Code Status: Full Code    Surrogate decision maker confirmed with patient:  Primary Emergency Contact: Gita Russell,

## 2025-05-19 NOTE — ANESTHESIA POSTPROCEDURE EVALUATION
Department of Anesthesiology  Postprocedure Note    Patient: Natali Wolff  MRN: [id number]  YOB: 1955  Date of evaluation: 5/19/2025    Procedure Summary       Date: 05/19/25 Room / Location: [address]    Anesthesia Start: 1334 Anesthesia Stop: 1440    Procedure: EVACUATION HEMATOMA LEFT LEG (Left: Leg Lower) Diagnosis:       Injury of left lower extremity, initial encounter      (Injury of left lower extremity, initial encounter [S89.92XA])    Surgeons: Goldy Renner MD Responsible Provider: Duke Zamora DO    Anesthesia Type: general ASA Status: 4            Anesthesia Type: No value filed.    Carla Phase I: Carla Score: 9    Carla Phase II:      Anesthesia Post Evaluation    Patient location during evaluation: PACU  Patient participation: complete - patient participated  Level of consciousness: awake and alert  Pain score: 0  Airway patency: patent  Nausea & Vomiting: no nausea and no vomiting  Cardiovascular status: blood pressure returned to baseline and hemodynamically stable  Respiratory status: acceptable  Hydration status: stable  Pain management: adequate    No notable events documented.

## 2025-05-19 NOTE — ANESTHESIA PRE PROCEDURE
mouth daily   Yes Radha Tovar MD   ferrous sulfate (IRON 325) 325 (65 Fe) MG tablet Take 1 tablet by mouth daily (with breakfast)   Yes Radha Tovar MD   mycophenolate (CELLCEPT) 500 MG tablet Take 2 tablets by mouth 2 times daily   Yes Radha Tovar MD       Current medications:    Current Facility-Administered Medications   Medication Dose Route Frequency Provider Last Rate Last Admin    HYDROmorphone (DILAUDID) injection 0.5 mg  0.5 mg IntraVENous Q3H PRN Kang Macias DO   0.5 mg at 05/19/25 0900    cloZAPine (CLOZARIL) tablet 25 mg  25 mg Oral Daily Kang Macias DO   25 mg at 05/18/25 2118    cycloSPORINE (SANDIMMUNE) capsule 100 mg  100 mg Oral BID Kang Macias DO   100 mg at 05/18/25 2118    venlafaxine (EFFEXOR XR) extended release capsule 150 mg  150 mg Oral Daily with breakfast Kang Macias DO        ferrous sulfate (IRON 325) tablet 325 mg  325 mg Oral Daily with breakfast Kang Macias DO        folic acid (FOLVITE) tablet 1 mg  1 mg Oral Daily Kang Macias DO        levothyroxine (SYNTHROID) tablet 50 mcg  50 mcg Oral Daily Gilberto KapadiaDO hien        metoprolol tartrate (LOPRESSOR) tablet 12.5 mg  12.5 mg Oral BID [facility] ERKang DO   12.5 mg at 05/18/25 2115    mycophenolate (CELLCEPT) capsule 1,000 mg  1,000 mg Oral BID Kang Macias DO   1,000 mg at 05/18/25 2117    predniSONE (DELTASONE) tablet 5 mg  5 mg Oral Daily Kang Macias DO        atorvastatin (LIPITOR) tablet 20 mg  20 mg Oral Daily Kang Macias DO   20 mg at 05/18/25 2115    sodium chloride flush 0.9 % injection 5-40 mL  5-40 mL IntraVENous 2 times per day Kang Macias DO   10 mL at 05/19/25 0901    sodium chloride flush 0.9 % injection 5-40 mL  5-40 mL IntraVENous PRN Kang Macias DO        0.9 % sodium chloride infusion   IntraVENous PRN Kang Macias DO        potassium chloride (KLOR-CON M) extended release tablet 40 mEq  40 mEq Oral PRN Kang Macias DO        Or    potassium bicarb-citric acid (EFFER-K) effervescent

## 2025-05-20 ENCOUNTER — HOSPITAL ENCOUNTER (OUTPATIENT)
Dept: OTHER | Age: 70
Setting detail: THERAPIES SERIES
End: 2025-05-20
Payer: MEDICARE

## 2025-05-20 ENCOUNTER — APPOINTMENT (OUTPATIENT)
Dept: GENERAL RADIOLOGY | Age: 70
DRG: 579 | End: 2025-05-20
Payer: MEDICARE

## 2025-05-20 LAB
ANION GAP SERPL CALCULATED.3IONS-SCNC: 13 MMOL/L (ref 7–16)
BUN SERPL-MCNC: 29 MG/DL (ref 6–23)
CALCIUM SERPL-MCNC: 9.5 MG/DL (ref 8.6–10.2)
CHLORIDE SERPL-SCNC: 105 MMOL/L (ref 98–107)
CO2 SERPL-SCNC: 22 MMOL/L (ref 22–29)
CREAT SERPL-MCNC: 1.7 MG/DL (ref 0.5–1)
EKG ATRIAL RATE: 80 BPM
EKG P AXIS: 48 DEGREES
EKG P-R INTERVAL: 170 MS
EKG Q-T INTERVAL: 416 MS
EKG QRS DURATION: 144 MS
EKG QTC CALCULATION (BAZETT): 479 MS
EKG R AXIS: -50 DEGREES
EKG T AXIS: 120 DEGREES
EKG VENTRICULAR RATE: 80 BPM
ERYTHROCYTE [DISTWIDTH] IN BLOOD BY AUTOMATED COUNT: 15 % (ref 11.5–15)
GFR, ESTIMATED: 32 ML/MIN/1.73M2
GLUCOSE SERPL-MCNC: 133 MG/DL (ref 74–99)
HCT VFR BLD AUTO: 25.7 % (ref 34–48)
HGB BLD-MCNC: 7.6 G/DL (ref 11.5–15.5)
MCH RBC QN AUTO: 30.9 PG (ref 26–35)
MCHC RBC AUTO-ENTMCNC: 29.6 G/DL (ref 32–34.5)
MCV RBC AUTO: 104.5 FL (ref 80–99.9)
PLATELET # BLD AUTO: 102 K/UL (ref 130–450)
PMV BLD AUTO: 13.1 FL (ref 7–12)
POTASSIUM SERPL-SCNC: 5.4 MMOL/L (ref 3.5–5)
RBC # BLD AUTO: 2.46 M/UL (ref 3.5–5.5)
SODIUM SERPL-SCNC: 140 MMOL/L (ref 132–146)
WBC OTHER # BLD: 11.3 K/UL (ref 4.5–11.5)

## 2025-05-20 PROCEDURE — 6360000002 HC RX W HCPCS: Performed by: ORTHOPAEDIC SURGERY

## 2025-05-20 PROCEDURE — 71045 X-RAY EXAM CHEST 1 VIEW: CPT

## 2025-05-20 PROCEDURE — 97535 SELF CARE MNGMENT TRAINING: CPT

## 2025-05-20 PROCEDURE — 80048 BASIC METABOLIC PNL TOTAL CA: CPT

## 2025-05-20 PROCEDURE — 93005 ELECTROCARDIOGRAM TRACING: CPT | Performed by: INTERNAL MEDICINE

## 2025-05-20 PROCEDURE — P9047 ALBUMIN (HUMAN), 25%, 50ML: HCPCS | Performed by: INTERNAL MEDICINE

## 2025-05-20 PROCEDURE — 2060000000 HC ICU INTERMEDIATE R&B

## 2025-05-20 PROCEDURE — 6370000000 HC RX 637 (ALT 250 FOR IP): Performed by: ORTHOPAEDIC SURGERY

## 2025-05-20 PROCEDURE — 2500000003 HC RX 250 WO HCPCS: Performed by: ORTHOPAEDIC SURGERY

## 2025-05-20 PROCEDURE — 97530 THERAPEUTIC ACTIVITIES: CPT

## 2025-05-20 PROCEDURE — 85027 COMPLETE CBC AUTOMATED: CPT

## 2025-05-20 PROCEDURE — 97165 OT EVAL LOW COMPLEX 30 MIN: CPT

## 2025-05-20 PROCEDURE — 99233 SBSQ HOSP IP/OBS HIGH 50: CPT | Performed by: INTERNAL MEDICINE

## 2025-05-20 PROCEDURE — 36415 COLL VENOUS BLD VENIPUNCTURE: CPT

## 2025-05-20 PROCEDURE — 6360000002 HC RX W HCPCS: Performed by: INTERNAL MEDICINE

## 2025-05-20 RX ORDER — FUROSEMIDE 10 MG/ML
60 INJECTION INTRAMUSCULAR; INTRAVENOUS ONCE
Status: DISCONTINUED | OUTPATIENT
Start: 2025-05-20 | End: 2025-05-20

## 2025-05-20 RX ORDER — ALBUMIN (HUMAN) 12.5 G/50ML
25 SOLUTION INTRAVENOUS ONCE
Status: COMPLETED | OUTPATIENT
Start: 2025-05-20 | End: 2025-05-20

## 2025-05-20 RX ORDER — BUMETANIDE 0.25 MG/ML
1 INJECTION, SOLUTION INTRAMUSCULAR; INTRAVENOUS 2 TIMES DAILY
Status: COMPLETED | OUTPATIENT
Start: 2025-05-20 | End: 2025-05-20

## 2025-05-20 RX ORDER — CLOZAPINE 25 MG/1
25 TABLET ORAL DAILY
Status: DISCONTINUED | OUTPATIENT
Start: 2025-05-21 | End: 2025-05-22 | Stop reason: HOSPADM

## 2025-05-20 RX ADMIN — HYDROMORPHONE HYDROCHLORIDE 0.5 MG: 1 INJECTION, SOLUTION INTRAMUSCULAR; INTRAVENOUS; SUBCUTANEOUS at 17:54

## 2025-05-20 RX ADMIN — HYDROMORPHONE HYDROCHLORIDE 0.5 MG: 1 INJECTION, SOLUTION INTRAMUSCULAR; INTRAVENOUS; SUBCUTANEOUS at 07:57

## 2025-05-20 RX ADMIN — SODIUM CHLORIDE, PRESERVATIVE FREE 10 ML: 5 INJECTION INTRAVENOUS at 21:02

## 2025-05-20 RX ADMIN — BUMETANIDE 1 MG: 0.25 INJECTION INTRAMUSCULAR; INTRAVENOUS at 09:35

## 2025-05-20 RX ADMIN — BUMETANIDE 1 MG: 0.25 INJECTION INTRAMUSCULAR; INTRAVENOUS at 17:37

## 2025-05-20 RX ADMIN — VENLAFAXINE HYDROCHLORIDE 150 MG: 150 CAPSULE, EXTENDED RELEASE ORAL at 08:09

## 2025-05-20 RX ADMIN — CYCLOSPORINE 100 MG: 25 CAPSULE, GELATIN COATED ORAL at 08:11

## 2025-05-20 RX ADMIN — CLOZAPINE 25 MG: 25 TABLET ORAL at 08:37

## 2025-05-20 RX ADMIN — SODIUM CHLORIDE, PRESERVATIVE FREE 10 ML: 5 INJECTION INTRAVENOUS at 07:58

## 2025-05-20 RX ADMIN — PREDNISONE 5 MG: 5 TABLET ORAL at 08:10

## 2025-05-20 RX ADMIN — Medication 325 MG: at 08:09

## 2025-05-20 RX ADMIN — LEVOTHYROXINE SODIUM 50 MCG: 0.05 TABLET ORAL at 08:10

## 2025-05-20 RX ADMIN — MYCOPHENOLATE MOFETIL 1000 MG: 250 CAPSULE ORAL at 21:01

## 2025-05-20 RX ADMIN — METOPROLOL TARTRATE 12.5 MG: 25 TABLET, FILM COATED ORAL at 08:11

## 2025-05-20 RX ADMIN — FOLIC ACID 1 MG: 1 TABLET ORAL at 08:10

## 2025-05-20 RX ADMIN — CYCLOSPORINE 100 MG: 25 CAPSULE, GELATIN COATED ORAL at 21:01

## 2025-05-20 RX ADMIN — ALBUMIN (HUMAN) 25 G: 0.25 INJECTION, SOLUTION INTRAVENOUS at 08:42

## 2025-05-20 RX ADMIN — MYCOPHENOLATE MOFETIL 1000 MG: 250 CAPSULE ORAL at 08:10

## 2025-05-20 RX ADMIN — ATORVASTATIN CALCIUM 20 MG: 20 TABLET, FILM COATED ORAL at 08:10

## 2025-05-20 ASSESSMENT — PAIN DESCRIPTION - ORIENTATION
ORIENTATION: LEFT;LOWER
ORIENTATION: LEFT;LOWER

## 2025-05-20 ASSESSMENT — PAIN DESCRIPTION - DESCRIPTORS
DESCRIPTORS: ACHING;POUNDING;THROBBING
DESCRIPTORS: BURNING

## 2025-05-20 ASSESSMENT — PAIN SCALES - GENERAL
PAINLEVEL_OUTOF10: 7
PAINLEVEL_OUTOF10: 9
PAINLEVEL_OUTOF10: 5
PAINLEVEL_OUTOF10: 8

## 2025-05-20 ASSESSMENT — PAIN DESCRIPTION - LOCATION
LOCATION: LEG
LOCATION: LEG

## 2025-05-20 ASSESSMENT — PAIN - FUNCTIONAL ASSESSMENT: PAIN_FUNCTIONAL_ASSESSMENT: ACTIVITIES ARE NOT PREVENTED

## 2025-05-20 NOTE — CARE COORDINATION
Case Management Assessment  Initial Evaluation    Date/Time of Evaluation: 5/20/2025 1:49 PM  Assessment Completed by: AG Vera    If patient is discharged prior to next notation, then this note serves as note for discharge by case management.    Patient Name: Natali Wolff                   YOB: 1955  Diagnosis: Leg hematoma, left, initial encounter [S80.12XA]                   Date / Time: 5/18/2025 10:01 AM    Patient Admission Status: Inpatient   Readmission Risk (Low < 19, Mod (19-27), High > 27): Readmission Risk Score: 27.2    Current PCP: Brennon Malave, DO  PCP verified by CM? Yes    Chart Reviewed: Yes      History Provided by: Patient  Patient Orientation: Alert and Oriented, Person, Place, Situation    Patient Cognition: Alert    Hospitalization in the last 30 days (Readmission):  No    If yes, Readmission Assessment in CM Navigator will be completed.    Advance Directives:      Code Status: Full Code   Patient's Primary Decision Maker is: Legal Next of Kin    Primary Decision Maker: Mayur Cheema - Child - [phone]    Secondary Decision Maker: Jarrod Russellmy - Other Relative - [phone]    Discharge Planning:    Patient lives with: Alone Type of Home: Apartment  Primary Care Giver: Self  Patient Support Systems include: Family Members   Current Financial resources:    Current community resources:    Current services prior to admission: Home Care            Current DME:              Type of Home Care services:  PT, Nursing Services, Aide Services    ADLS  Prior functional level: Assistance with the following:, Housework, Cooking, Shopping  Current functional level: Housework, Cooking, Shopping, Bathing, Dressing, Toileting, Mobility, Assistance with the following:    PT AM-PAC:   /24  OT AM-PAC: 13 /24    Family can provide assistance at DC: No  Would you like Case Management to discuss the discharge plan with any other family members/significant others, and if so,

## 2025-05-20 NOTE — PLAN OF CARE
Problem: Chronic Conditions and Co-morbidities  Goal: Patient's chronic conditions and co-morbidity symptoms are monitored and maintained or improved  5/20/2025 0510 by Praveena Cortez RN  Outcome: Progressing     Problem: Discharge Planning  Goal: Discharge to home or other facility with appropriate resources  5/20/2025 0510 by Praveena Cortez RN  Outcome: Progressing     Problem: ABCDS Injury Assessment  Goal: Absence of physical injury  5/20/2025 0510 by Praveena Cortez RN  Outcome: Progressing     Problem: Safety - Adult  Goal: Free from fall injury  5/20/2025 0510 by Praveena Cortez RN  Outcome: Progressing     Problem: Pain  Goal: Verbalizes/displays adequate comfort level or baseline comfort level  5/20/2025 1304 by Lucrecia Hall RN  Flowsheets (Taken 5/20/2025 1304)  Verbalizes/displays adequate comfort level or baseline comfort level:   Encourage patient to monitor pain and request assistance   Assess pain using appropriate pain scale   Administer analgesics based on type and severity of pain and evaluate response   Implement non-pharmacological measures as appropriate and evaluate response   Consider cultural and social influences on pain and pain management  5/20/2025 0510 by Praveena Cortez RN  Outcome: Progressing

## 2025-05-20 NOTE — CARE COORDINATION
05/20/25 1423   Nursing Home/SNF - New   SNF Status placed   SNF Last Pac Response accepted   SNF Pac Name [facility] - CPAN Member   SNF Last Comment Sent when will she be ready? does she meet waiver?   SNF Placed PAC Name [facility] - CPAN Member   SNF Placed PAC Address [address] [id number]   SNF Placed PAC Phone Number [phone]   SNF Provider Phone Number [phone]      Pt will have 3 midnights after tonight [facility].

## 2025-05-20 NOTE — CARE COORDINATION
05/20/25 1337   Patient Choice Bypass   Responder Name Natali Keyon Wolff   Patient Choice 1   Name [facility] Member   Address [address]   CMS ID [id number]   LOC Nursing Home/SNF   Choice 1 Provider Phone Number [phone]

## 2025-05-21 ENCOUNTER — APPOINTMENT (OUTPATIENT)
Age: 70
DRG: 579 | End: 2025-05-21
Attending: INTERNAL MEDICINE
Payer: MEDICARE

## 2025-05-21 LAB
ANION GAP SERPL CALCULATED.3IONS-SCNC: 8 MMOL/L (ref 7–16)
BUN SERPL-MCNC: 35 MG/DL (ref 6–23)
CALCIUM SERPL-MCNC: 9.5 MG/DL (ref 8.6–10.2)
CHLORIDE SERPL-SCNC: 104 MMOL/L (ref 98–107)
CO2 SERPL-SCNC: 28 MMOL/L (ref 22–29)
CREAT SERPL-MCNC: 1.8 MG/DL (ref 0.5–1)
ERYTHROCYTE [DISTWIDTH] IN BLOOD BY AUTOMATED COUNT: 14.8 % (ref 11.5–15)
GFR, ESTIMATED: 30 ML/MIN/1.73M2
GLUCOSE SERPL-MCNC: 93 MG/DL (ref 74–99)
HCT VFR BLD AUTO: 23.5 % (ref 34–48)
HCT VFR BLD AUTO: 30.1 % (ref 34–48)
HGB BLD-MCNC: 6.8 G/DL (ref 11.5–15.5)
HGB BLD-MCNC: 8.7 G/DL (ref 11.5–15.5)
MAGNESIUM SERPL-MCNC: 2.1 MG/DL (ref 1.6–2.6)
MCH RBC QN AUTO: 30.6 PG (ref 26–35)
MCHC RBC AUTO-ENTMCNC: 28.9 G/DL (ref 32–34.5)
MCV RBC AUTO: 105.9 FL (ref 80–99.9)
MICROORGANISM SPEC CULT: NO GROWTH
MICROORGANISM/AGENT SPEC: NORMAL
PLATELET, FLUORESCENCE: 115 K/UL (ref 130–450)
PMV BLD AUTO: 13.6 FL (ref 7–12)
POTASSIUM SERPL-SCNC: 4.1 MMOL/L (ref 3.5–5)
RBC # BLD AUTO: 2.22 M/UL (ref 3.5–5.5)
SERVICE CMNT-IMP: NORMAL
SODIUM SERPL-SCNC: 140 MMOL/L (ref 132–146)
SPECIMEN DESCRIPTION: NORMAL
WBC OTHER # BLD: 7.6 K/UL (ref 4.5–11.5)

## 2025-05-21 PROCEDURE — P9016 RBC LEUKOCYTES REDUCED: HCPCS

## 2025-05-21 PROCEDURE — 6370000000 HC RX 637 (ALT 250 FOR IP): Performed by: ORTHOPAEDIC SURGERY

## 2025-05-21 PROCEDURE — 97161 PT EVAL LOW COMPLEX 20 MIN: CPT | Performed by: PHYSICAL THERAPIST

## 2025-05-21 PROCEDURE — 2060000000 HC ICU INTERMEDIATE R&B

## 2025-05-21 PROCEDURE — 97606 NEG PRS WND THER DME>50 SQCM: CPT

## 2025-05-21 PROCEDURE — 6360000002 HC RX W HCPCS: Performed by: ORTHOPAEDIC SURGERY

## 2025-05-21 PROCEDURE — 2500000003 HC RX 250 WO HCPCS: Performed by: ORTHOPAEDIC SURGERY

## 2025-05-21 PROCEDURE — 85027 COMPLETE CBC AUTOMATED: CPT

## 2025-05-21 PROCEDURE — 6360000002 HC RX W HCPCS: Performed by: INTERNAL MEDICINE

## 2025-05-21 PROCEDURE — 30233N1 TRANSFUSION OF NONAUTOLOGOUS RED BLOOD CELLS INTO PERIPHERAL VEIN, PERCUTANEOUS APPROACH: ICD-10-PCS | Performed by: INTERNAL MEDICINE

## 2025-05-21 PROCEDURE — 99232 SBSQ HOSP IP/OBS MODERATE 35: CPT | Performed by: INTERNAL MEDICINE

## 2025-05-21 PROCEDURE — 85018 HEMOGLOBIN: CPT

## 2025-05-21 PROCEDURE — 97530 THERAPEUTIC ACTIVITIES: CPT | Performed by: PHYSICAL THERAPIST

## 2025-05-21 PROCEDURE — 36430 TRANSFUSION BLD/BLD COMPNT: CPT

## 2025-05-21 PROCEDURE — 6370000000 HC RX 637 (ALT 250 FOR IP): Performed by: INTERNAL MEDICINE

## 2025-05-21 PROCEDURE — 83735 ASSAY OF MAGNESIUM: CPT

## 2025-05-21 PROCEDURE — 85014 HEMATOCRIT: CPT

## 2025-05-21 PROCEDURE — 80048 BASIC METABOLIC PNL TOTAL CA: CPT

## 2025-05-21 PROCEDURE — 36415 COLL VENOUS BLD VENIPUNCTURE: CPT

## 2025-05-21 RX ORDER — BUMETANIDE 1 MG/1
1 TABLET ORAL DAILY
Status: DISCONTINUED | OUTPATIENT
Start: 2025-05-21 | End: 2025-05-22 | Stop reason: HOSPADM

## 2025-05-21 RX ORDER — SODIUM CHLORIDE 9 MG/ML
INJECTION, SOLUTION INTRAVENOUS PRN
Status: DISCONTINUED | OUTPATIENT
Start: 2025-05-21 | End: 2025-05-22 | Stop reason: HOSPADM

## 2025-05-21 RX ORDER — CEFDINIR 300 MG/1
300 CAPSULE ORAL DAILY
Status: DISCONTINUED | OUTPATIENT
Start: 2025-05-21 | End: 2025-05-22 | Stop reason: HOSPADM

## 2025-05-21 RX ADMIN — MYCOPHENOLATE MOFETIL 1000 MG: 250 CAPSULE ORAL at 08:12

## 2025-05-21 RX ADMIN — HYDROMORPHONE HYDROCHLORIDE 0.25 MG: 1 INJECTION, SOLUTION INTRAMUSCULAR; INTRAVENOUS; SUBCUTANEOUS at 10:22

## 2025-05-21 RX ADMIN — HYDROMORPHONE HYDROCHLORIDE 0.5 MG: 1 INJECTION, SOLUTION INTRAMUSCULAR; INTRAVENOUS; SUBCUTANEOUS at 03:44

## 2025-05-21 RX ADMIN — HYDROMORPHONE HYDROCHLORIDE 0.5 MG: 1 INJECTION, SOLUTION INTRAMUSCULAR; INTRAVENOUS; SUBCUTANEOUS at 15:05

## 2025-05-21 RX ADMIN — METOPROLOL TARTRATE 12.5 MG: 25 TABLET, FILM COATED ORAL at 08:10

## 2025-05-21 RX ADMIN — Medication 325 MG: at 20:06

## 2025-05-21 RX ADMIN — PREDNISONE 5 MG: 5 TABLET ORAL at 08:10

## 2025-05-21 RX ADMIN — LEVOTHYROXINE SODIUM 50 MCG: 0.05 TABLET ORAL at 08:10

## 2025-05-21 RX ADMIN — SODIUM CHLORIDE, PRESERVATIVE FREE 10 ML: 5 INJECTION INTRAVENOUS at 08:23

## 2025-05-21 RX ADMIN — MYCOPHENOLATE MOFETIL 1000 MG: 250 CAPSULE ORAL at 20:07

## 2025-05-21 RX ADMIN — CLOZAPINE 25 MG: 25 TABLET ORAL at 20:08

## 2025-05-21 RX ADMIN — BUMETANIDE 1 MG: 1 TABLET ORAL at 11:35

## 2025-05-21 RX ADMIN — VENLAFAXINE HYDROCHLORIDE 150 MG: 150 CAPSULE, EXTENDED RELEASE ORAL at 20:06

## 2025-05-21 RX ADMIN — FOLIC ACID 1 MG: 1 TABLET ORAL at 08:10

## 2025-05-21 RX ADMIN — HYDROMORPHONE HYDROCHLORIDE 0.5 MG: 1 INJECTION, SOLUTION INTRAMUSCULAR; INTRAVENOUS; SUBCUTANEOUS at 18:11

## 2025-05-21 RX ADMIN — HYDROMORPHONE HYDROCHLORIDE 0.5 MG: 1 INJECTION, SOLUTION INTRAMUSCULAR; INTRAVENOUS; SUBCUTANEOUS at 21:49

## 2025-05-21 RX ADMIN — HYDROMORPHONE HYDROCHLORIDE 0.5 MG: 1 INJECTION, SOLUTION INTRAMUSCULAR; INTRAVENOUS; SUBCUTANEOUS at 08:12

## 2025-05-21 RX ADMIN — HYDROMORPHONE HYDROCHLORIDE 0.5 MG: 1 INJECTION, SOLUTION INTRAMUSCULAR; INTRAVENOUS; SUBCUTANEOUS at 11:34

## 2025-05-21 RX ADMIN — CYCLOSPORINE 100 MG: 25 CAPSULE, GELATIN COATED ORAL at 20:06

## 2025-05-21 RX ADMIN — CEFDINIR 300 MG: 300 CAPSULE ORAL at 11:35

## 2025-05-21 RX ADMIN — SODIUM CHLORIDE, PRESERVATIVE FREE 10 ML: 5 INJECTION INTRAVENOUS at 20:08

## 2025-05-21 RX ADMIN — CYCLOSPORINE 100 MG: 25 CAPSULE, GELATIN COATED ORAL at 08:12

## 2025-05-21 RX ADMIN — ATORVASTATIN CALCIUM 20 MG: 20 TABLET, FILM COATED ORAL at 20:06

## 2025-05-21 RX ADMIN — METOPROLOL TARTRATE 12.5 MG: 25 TABLET, FILM COATED ORAL at 20:06

## 2025-05-21 ASSESSMENT — PAIN DESCRIPTION - ORIENTATION
ORIENTATION: LEFT;LOWER
ORIENTATION: LEFT;LOWER
ORIENTATION: LEFT
ORIENTATION: LEFT;LOWER
ORIENTATION: LEFT
ORIENTATION: LEFT
ORIENTATION: LEFT;LOWER

## 2025-05-21 ASSESSMENT — PAIN SCALES - GENERAL
PAINLEVEL_OUTOF10: 10
PAINLEVEL_OUTOF10: 9
PAINLEVEL_OUTOF10: 10
PAINLEVEL_OUTOF10: 0
PAINLEVEL_OUTOF10: 10
PAINLEVEL_OUTOF10: 9
PAINLEVEL_OUTOF10: 10

## 2025-05-21 ASSESSMENT — PAIN - FUNCTIONAL ASSESSMENT: PAIN_FUNCTIONAL_ASSESSMENT: PREVENTS OR INTERFERES WITH ALL ACTIVE AND SOME PASSIVE ACTIVITIES

## 2025-05-21 ASSESSMENT — PAIN DESCRIPTION - LOCATION
LOCATION: LEG

## 2025-05-21 ASSESSMENT — PAIN DESCRIPTION - DESCRIPTORS: DESCRIPTORS: ACHING

## 2025-05-21 NOTE — ACP (ADVANCE CARE PLANNING)
Advance Care Planning   Healthcare Decision Maker:    Primary Decision Maker: Mayur Cheema - Sayda - [phone]    Secondary Decision Maker: Gita Russell - Other Relative - [phone]    Click here to complete Healthcare Decision Makers including selection of the Healthcare Decision Maker Relationship (ie \"Primary\").  Today we documented Decision Maker(s) consistent with Legal Next of Kin hierarchy.

## 2025-05-21 NOTE — DISCHARGE INSTR - COC
Continuity of Care Form    Patient Name: Natali Wolff   :  1955  MRN:  [id number]    Admit date:  2025  Discharge date:  25      Code Status Order: Full Code   Advance Directives:    Date/Time Healthcare Directive Type of Healthcare Directive Copy in Chart Healthcare Agent Appointed Healthcare Agent's Name Healthcare Agent's Phone Number    25 0833 No, patient does not have an advance directive for healthcare treatment  --  --  --  --  --             Admitting Physician:  Kang Macias DO  PCP: Brennon Malave DO    Discharging Nurse: Gómez Garcia  Discharging Hospital Unit/Room#: 0638/0638-01  Discharging Unit Phone Number: [phone]    Emergency Contact:   Extended Emergency Contact Information  Primary Emergency Contact: Drew,Gita  Home Phone: [phone]  Mobile Phone: [phone]  Relation: Other Relative  Preferred language: English   needed? No  Secondary Emergency Contact: Mayur Cheema   [facility]  Home Phone: [phone]  Mobile Phone: [phone]  Relation: Child   needed? No    Past Surgical History:  Past Surgical History:   Procedure Laterality Date    CATARACT REMOVAL Right 2023    approx     SECTION  1985    one normal delivery    COLECTOMY N/A 01/15/2022    DIAGNOSTIC  LAPAROSCOPY LYSIS OF ADHESIONS performed by Carl Rodríguez MD at [facility] OR    COLONOSCOPY      DIAGNOSTIC CARDIAC CATH LAB PROCEDURE      normal coronaries and 1996 normal coronaries    DIALYSIS FISTULA CREATION  march and 2012    phase one and two patient will start dialysis when needed    FEMORAL BYPASS      transfemoral venous bypass grafts    IR GUIDED IVC FILTER PLACEMENT  2022    IR IVC FILTER PLACEMENT W IMAGING 2022 [facility] SPECIAL PROCEDURES    KIDNEY TRANSPLANT  2016    KIDNEY TRANSPLANT  2015    KIDNEY TRANSPLANT  2015    LEG SURGERY Left 2025    EVACUATION HEMATOMA LEFT LEG performed by Goldy Renner,

## 2025-05-21 NOTE — CONSENT
Informed Consent for Blood Component Transfusion Note    I have discussed with the patient the rationale for blood component transfusion; its benefits in treating or preventing fatigue, organ damage, or death; and its risk which includes mild transfusion reactions, rare risk of blood borne infection, or more serious but rare reactions. I have discussed the alternatives to transfusion, including the risk and consequences of not receiving transfusion. The patient had an opportunity to ask questions and had agreed to proceed with transfusion of blood components.    Electronically signed by Jeovany Hankins DO on 5/21/25 at 8:44 AM EDT

## 2025-05-21 NOTE — CONSULTS
[facility]  MD Handy Ferro MD Ali Hassan, MD Lisa Kniska, ZAIRE Mohr, CNP  Consultation    Patient's Name: Natali Wolff  12:20 PM  2025    Nephrologist: Handy Latham MD    Reason for Consult: ECCILY, CKD, status post kidney transplant    Requesting Physician:  Brennon Malave DO    Chief Complaint: Hematoma left lower extremity status post fall    History Obtained From: Patient, chart    History of Present Ilness:    Ms. Keyon Wolff is a pleasant 70-year-old woman well-known to me from outpatient practice for follow her for kidney transplant follow-up, and CKD stage IIIb.  Baseline creatinine 1.4 to 1.6 mg/dL.  She underwent nonrelated  donor kidney transplant in  at .  She has been good nette for her kidney over the years.  No known episodes of rejection.  3 days prior to presentation she suffered a fall at home which she calls her self which she thought was initially a bruise, though became a very large hematoma as she takes Eliquis chronically.  She is admitted yesterday for drainage of the hematoma and placement of VAC dressing.  Procedure was without complication.  Hemoglobin has dropped from 11.5 on -10 on -6.8 on .  She is going to be receiving 1 unit PRBCs shortly.  Besides discomfort at the VAC dressing site, no complaint.    Past Medical History:   Diagnosis Date    Abnormal EKG     left bundle branch block    Acute cystitis without hematuria 2023    Acute cystitis without hematuria 2023    Acute exacerbation of chronic obstructive pulmonary disease (COPD) (HCC) 2022    Acute gout involving toe of right foot 2020    Acute gout involving toe of right foot 2020    Acute metabolic encephalopathy     Acute on chronic combined systolic (congestive) and diastolic (congestive) heart failure (HCC)     Acute pancreatitis 2024    asthmatic 
Comprehensive Nutrition Assessment    Type and Reason for Visit:  Initial, Consult, Wound    Nutrition Recommendations/Plan:   Continue Current Diet (Regular)  Begin Hector BID  Consult RD as needed      Malnutrition Assessment:  Malnutrition Status:  No malnutrition (05/21/25 1313)    Context:  Acute Illness     Findings of the 6 clinical characteristics of malnutrition:  Energy Intake:  No decrease in energy intake  Weight Loss:  No weight loss     Body Fat Loss:  No body fat loss     Muscle Mass Loss:  No muscle mass loss    Fluid Accumulation:  Mild Extremities   Strength:  Not Performed    Nutrition Assessment:    Pt admit w/ hematoma of LLE 2/2 fall, CECILY. Pt s/p hematoma evacuation w/ wound vac application 05/19/25. PMHx: COPD, Gout, CHF, Bi-Polar, Cancer, Clotting Disorder, HTN/HLD, Hypothyroid, Lymphedema, PVD, A-fib, Kidney Transplant (2015). Pt eating % of all meals. Will provide Hector BID, continue inpatient monitoring, f/up per policy.    Nutrition Related Findings:    A/O x4, I/O -2120 since admit, abd wdl, hypo bowel sounds x4, trace edema, elevated BUN/Creatinine, GFR 30, Hgb 6.8, 1 unit PRBC's, e'lytes wnl Wound Type: Open Wounds, Wound Vac (Pre-tibial left leg)       Current Nutrition Intake & Therapies:    Average Meal Intake: %  Average Supplements Intake: None Ordered  ADULT DIET; Regular; Low Fat/Low Chol/High Fiber/NAIN; Low Sodium (2 gm)  ADULT ORAL NUTRITION SUPPLEMENT; Dinner, Breakfast; Wound Healing Oral Supplement    Anthropometric Measures:  Height: 157.5 cm (5' 2.01\")  Ideal Body Weight (IBW): 110 lbs (50 kg)    Admission Body Weight: 95.3 kg (210 lb 1.6 oz) (05/18/25 stated)  Current Body Weight: 95.3 kg (210 lb 1.6 oz), 191 % IBW. Weight Source: Stated (05/18/25)  Current BMI (kg/m2): 38.4  Usual Body Weight: 93.9 kg (207 lb 0.2 oz) (12/30/24 standing scale)     % Weight Change (Calculated): 1.5  Weight Adjustment For: No Adjustment        BMI Categories: Obese Class 2 
    05/18/25 1039 05/18/25 2052 05/19/25 0426   WBC 12.2*  --  9.5   RBC 3.65  --  3.08*   HGB 11.3* 10.0* 9.4*   HCT 38.3 33.3* 32.1*   .9*  --  104.2*   RDW 14.7  --  14.9     CHEMISTRIES:  Recent Labs     05/18/25  1039 05/19/25 0426    140   K 4.2 4.2   CL 99 104   CO2 21* 25   BUN 29* 27*   CREATININE 1.7* 1.6*   GLUCOSE 130* 127*     PT/INR:  Recent Labs     05/19/25 0426   PROTIME 12.2   INR 1.1     APTT:No results for input(s): \"APTT\" in the last 72 hours.  LIVER PROFILE:No results for input(s): \"AST\", \"ALT\", \"BILIDIR\", \"BILITOT\", \"ALKPHOS\" in the last 72 hours.    Imaging/Diagnostics   CT TIBIA FIBULA LEFT W CONTRAST  Result Date: 5/18/2025  1. Large hematoma at the anterior and anterolateral aspect of the left tibia and fibula measuring 11 x 5 x 17.1 cm.  8 mm focus of contrast extravasation anterior to the proximal tibial shaft, consistent with an active bleeding site. 2. Diffuse subcutaneous edema. 3. No acute fracture or dislocation. 4.  The findings were sent to the Radiology Results Communication [facility] at 5:47 pm on 5/18/2025 to be communicated to a licensed caregiver.     XR TIBIA FIBULA LEFT (2 VIEWS)  Result Date: 5/18/2025  1. No acute fracture or dislocation of the left knee or tibia/fibula. 2. Severe tricompartment DJD of the left knee. 3. Trace to small suprapatellar joint effusion. 4. Diffuse body wall edema.     XR KNEE LEFT (3 VIEWS)  Result Date: 5/18/2025  1. No acute fracture or dislocation of the left knee or tibia/fibula. 2. Severe tricompartment DJD of the left knee. 3. Trace to small suprapatellar joint effusion. 4. Diffuse body wall edema.       Assessment      Hospital Problems           Last Modified POA    * (Principal) Leg hematoma, left, initial encounter 5/18/2025 Yes       Plan   1.  Hematoma is large and is causing skin pocket compromise.  Will plan on doing an evacuation of hematoma.  Is a possibility we will leave the wound open or put a drain in.  We

## 2025-05-21 NOTE — FLOWSHEET NOTE
Inpatient Wound Care    Admit Date: 5/18/2025 10:01 AM    Reason for consult:  left leg    Significant history:    Past Medical History:   Diagnosis Date    Abnormal EKG     left bundle branch block    Acute cystitis without hematuria 01/20/2023    Acute cystitis without hematuria 01/20/2023    Acute exacerbation of chronic obstructive pulmonary disease (COPD) (HCC) 05/19/2022    Acute gout involving toe of right foot 09/03/2020    Acute gout involving toe of right foot 09/03/2020    Acute metabolic encephalopathy     Acute on chronic combined systolic (congestive) and diastolic (congestive) heart failure (HCC)     Acute pancreatitis 06/01/2024    asthmatic bronchitis 03/07/2019    Bipolar disorder (HCC) 11/26/2018    Cancer (formerly [facility])     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    Cerebrovascular disease     Clotting disorder 1985    thrombophlebitis after c section delivery    Depression     15 years followed by dr zuluaga    [facility] patient     History of blood transfusion     History of renal transplant 10/2015     in [place] Dr Oscar Wilkinson    Hyperlipidemia     Hypertension     Hypotension     Hypothyroidism     Hypothyroidism 11/26/2018    Hypothyroidism 02/27/2025    Hypoxia 02/25/2022    Leg swelling 09/03/2020    Leukocytosis 01/10/2022    Lymphedema of both lower extremities 09/03/2020    Other emphysema (HCC) 06/02/2024    Paroxysmal atrial fibrillation (HCC)     Peripheral vascular disease     Renal failure 11/2012    renal transplant    Sepsis (HCC)     2021    Thrombophlebitis     after c section delivery    Thyroid disease          Findings:     05/21/25 1040   Wound 05/18/25 Pretibial Left;Anterior   Date First Assessed/Time First Assessed: 05/18/25 2030   Present on Original Admission: Yes  Primary Wound Type: (c) Traumatic  Secondary Wound Type - Traumatic: Blister  Location: Pretibial  Wound Location Orientation: Left;Anterior   Wound Image    Wound Etiology

## 2025-05-21 NOTE — CARE COORDINATION
SOCIAL WORK / DISCHARGE PLANNING:  Pt accepted to [facility], [facility] [phone], Fax [phone], skilled. NO PRECERT needed. Pt to receive blood today. Decrease in O2 demand today, to 2-3L O2. GINA will need signed by physician. HENS form initiated, will need completed prior to dc.         Electronically signed by AG Vera on 5/21/2025 at 10:06 AM

## 2025-05-21 NOTE — PLAN OF CARE
Problem: Chronic Conditions and Co-morbidities  Goal: Patient's chronic conditions and co-morbidity symptoms are monitored and maintained or improved  5/21/2025 1038 by Gómez Garcia RN  Outcome: Progressing  Flowsheets (Taken 5/21/2025 0800)  Care Plan - Patient's Chronic Conditions and Co-Morbidity Symptoms are Monitored and Maintained or Improved:   Monitor and assess patient's chronic conditions and comorbid symptoms for stability, deterioration, or improvement   Collaborate with multidisciplinary team to address chronic and comorbid conditions and prevent exacerbation or deterioration   Update acute care plan with appropriate goals if chronic or comorbid symptoms are exacerbated and prevent overall improvement and discharge  5/21/2025 0145 by Praveena Cortez RN  Outcome: Progressing     Problem: Discharge Planning  Goal: Discharge to home or other facility with appropriate resources  5/21/2025 1038 by Gómez Garcia RN  Outcome: Progressing  Flowsheets (Taken 5/21/2025 0800)  Discharge to home or other facility with appropriate resources:   Identify barriers to discharge with patient and caregiver   Arrange for needed discharge resources and transportation as appropriate   Identify discharge learning needs (meds, wound care, etc)  5/21/2025 0145 by Praveena Cortez RN  Outcome: Progressing     Problem: ABCDS Injury Assessment  Goal: Absence of physical injury  5/21/2025 1038 by Gómez Garcia RN  Outcome: Progressing  5/21/2025 0145 by Praveena Cortez RN  Outcome: Progressing     Problem: Safety - Adult  Goal: Free from fall injury  5/21/2025 1038 by Gómez Garcia RN  Outcome: Progressing  5/21/2025 0145 by Praveena Cortez RN  Outcome: Progressing     Problem: Pain  Goal: Verbalizes/displays adequate comfort level or baseline comfort level  5/21/2025 1038 by Gómez Garcia RN  Outcome: Progressing  5/21/2025 0145 by Praveena Cortez RN  Outcome: Progressing

## 2025-05-22 ENCOUNTER — APPOINTMENT (OUTPATIENT)
Age: 70
DRG: 579 | End: 2025-05-22
Attending: INTERNAL MEDICINE
Payer: MEDICARE

## 2025-05-22 VITALS
DIASTOLIC BLOOD PRESSURE: 64 MMHG | BODY MASS INDEX: 38.64 KG/M2 | OXYGEN SATURATION: 92 % | HEIGHT: 62 IN | RESPIRATION RATE: 16 BRPM | WEIGHT: 210 LBS | SYSTOLIC BLOOD PRESSURE: 122 MMHG | TEMPERATURE: 97.5 F | HEART RATE: 67 BPM

## 2025-05-22 LAB
ABO/RH: NORMAL
ANION GAP SERPL CALCULATED.3IONS-SCNC: 8 MMOL/L (ref 7–16)
ANTIBODY SCREEN: NEGATIVE
ARM BAND NUMBER: NORMAL
BLOOD BANK BLOOD PRODUCT EXPIRATION DATE: NORMAL
BLOOD BANK DISPENSE STATUS: NORMAL
BLOOD BANK ISBT PRODUCT BLOOD TYPE: 6200
BLOOD BANK PRODUCT CODE: NORMAL
BLOOD BANK SAMPLE EXPIRATION: NORMAL
BLOOD BANK UNIT TYPE AND RH: NORMAL
BPU ID: NORMAL
BUN SERPL-MCNC: 35 MG/DL (ref 6–23)
CALCIUM SERPL-MCNC: 9.7 MG/DL (ref 8.6–10.2)
CHLORIDE SERPL-SCNC: 101 MMOL/L (ref 98–107)
CO2 SERPL-SCNC: 29 MMOL/L (ref 22–29)
COMPONENT: NORMAL
CREAT SERPL-MCNC: 1.8 MG/DL (ref 0.5–1)
CROSSMATCH RESULT: NORMAL
ECHO AO ASC DIAM: 3.3 CM
ECHO AO ASCENDING AORTA INDEX: 1.69 CM/M2
ECHO AV AREA PEAK VELOCITY: 2.1 CM2
ECHO AV AREA VTI: 2.6 CM2
ECHO AV AREA/BSA PEAK VELOCITY: 1.1 CM2/M2
ECHO AV AREA/BSA VTI: 1.3 CM2/M2
ECHO AV MEAN GRADIENT: 13 MMHG
ECHO AV MEAN VELOCITY: 1.7 M/S
ECHO AV PEAK GRADIENT: 22 MMHG
ECHO AV PEAK VELOCITY: 2.4 M/S
ECHO AV VELOCITY RATIO: 0.58
ECHO AV VTI: 41.7 CM
ECHO BSA: 2.04 M2
ECHO EST RA PRESSURE: 3 MMHG
ECHO LA DIAMETER INDEX: 2.46 CM/M2
ECHO LA DIAMETER: 4.8 CM
ECHO LA VOL A-L A2C: 91 ML (ref 22–52)
ECHO LA VOL A-L A4C: 48 ML (ref 22–52)
ECHO LA VOL BP: 63 ML (ref 22–52)
ECHO LA VOL MOD A2C: 86 ML (ref 22–52)
ECHO LA VOL MOD A4C: 47 ML (ref 22–52)
ECHO LA VOL/BSA BIPLANE: 32 ML/M2 (ref 16–34)
ECHO LA VOLUME AREA LENGTH: 66 ML
ECHO LA VOLUME INDEX A-L A2C: 47 ML/M2 (ref 16–34)
ECHO LA VOLUME INDEX A-L A4C: 25 ML/M2 (ref 16–34)
ECHO LA VOLUME INDEX AREA LENGTH: 34 ML/M2 (ref 16–34)
ECHO LA VOLUME INDEX MOD A2C: 44 ML/M2 (ref 16–34)
ECHO LA VOLUME INDEX MOD A4C: 24 ML/M2 (ref 16–34)
ECHO LV EDV A2C: 94 ML
ECHO LV EDV A4C: 127 ML
ECHO LV EDV BP: 113 ML (ref 56–104)
ECHO LV EDV INDEX A4C: 65 ML/M2
ECHO LV EDV INDEX BP: 58 ML/M2
ECHO LV EDV NDEX A2C: 48 ML/M2
ECHO LV EF PHYSICIAN: 50 %
ECHO LV EJECTION FRACTION A2C: 52 %
ECHO LV EJECTION FRACTION A4C: 51 %
ECHO LV EJECTION FRACTION BIPLANE: 53 % (ref 55–100)
ECHO LV ESV A2C: 45 ML
ECHO LV ESV A4C: 62 ML
ECHO LV ESV BP: 53 ML (ref 19–49)
ECHO LV ESV INDEX A2C: 23 ML/M2
ECHO LV ESV INDEX A4C: 32 ML/M2
ECHO LV ESV INDEX BP: 27 ML/M2
ECHO LV FRACTIONAL SHORTENING: 34 % (ref 28–44)
ECHO LV INTERNAL DIMENSION DIASTOLE INDEX: 2.56 CM/M2
ECHO LV INTERNAL DIMENSION DIASTOLIC: 5 CM (ref 3.9–5.3)
ECHO LV INTERNAL DIMENSION SYSTOLIC INDEX: 1.69 CM/M2
ECHO LV INTERNAL DIMENSION SYSTOLIC: 3.3 CM
ECHO LV ISOVOLUMETRIC RELAXATION TIME (IVRT): 129.4 MS
ECHO LV IVSD: 1.2 CM (ref 0.6–0.9)
ECHO LV MASS 2D: 247.6 G (ref 67–162)
ECHO LV MASS INDEX 2D: 127 G/M2 (ref 43–95)
ECHO LV POSTERIOR WALL DIASTOLIC: 1.3 CM (ref 0.6–0.9)
ECHO LV RELATIVE WALL THICKNESS RATIO: 0.52
ECHO LVOT AREA: 3.5 CM2
ECHO LVOT AV VTI INDEX: 0.72
ECHO LVOT DIAM: 2.1 CM
ECHO LVOT MEAN GRADIENT: 5 MMHG
ECHO LVOT PEAK GRADIENT: 8 MMHG
ECHO LVOT PEAK VELOCITY: 1.4 M/S
ECHO LVOT STROKE VOLUME INDEX: 53.3 ML/M2
ECHO LVOT SV: 103.9 ML
ECHO LVOT VTI: 30 CM
ECHO MV "A" WAVE DURATION: 140.8 MSEC
ECHO MV A VELOCITY: 1.11 M/S
ECHO MV AREA PHT: 3.8 CM2
ECHO MV AREA VTI: 3.2 CM2
ECHO MV E DECELERATION TIME (DT): 200.9 MS
ECHO MV E VELOCITY: 0.89 M/S
ECHO MV E/A RATIO: 0.8
ECHO MV LVOT VTI INDEX: 1.09
ECHO MV MAX VELOCITY: 1.1 M/S
ECHO MV MEAN GRADIENT: 2 MMHG
ECHO MV MEAN VELOCITY: 0.7 M/S
ECHO MV PEAK GRADIENT: 5 MMHG
ECHO MV PRESSURE HALF TIME (PHT): 58.1 MS
ECHO MV VTI: 32.7 CM
ECHO PV MAX VELOCITY: 1.1 M/S
ECHO PV MEAN GRADIENT: 3 MMHG
ECHO PV MEAN VELOCITY: 0.8 M/S
ECHO PV PEAK GRADIENT: 5 MMHG
ECHO PV VTI: 20.8 CM
ECHO PVEIN A DURATION: 137 MS
ECHO PVEIN A VELOCITY: 0.2 M/S
ECHO PVEIN PEAK D VELOCITY: 0.3 M/S
ECHO PVEIN PEAK S VELOCITY: 0.4 M/S
ECHO PVEIN S/D RATIO: 1.3
ECHO RV INTERNAL DIMENSION: 3.7 CM
ECHO RV LONGITUDINAL DIMENSION: 7.4 CM
ECHO RV MID DIMENSION: 2.6 CM
ERYTHROCYTE [DISTWIDTH] IN BLOOD BY AUTOMATED COUNT: 16.1 % (ref 11.5–15)
FERRITIN SERPL-MCNC: 341 NG/ML
GFR, ESTIMATED: 31 ML/MIN/1.73M2
GLUCOSE SERPL-MCNC: 90 MG/DL (ref 74–99)
HCT VFR BLD AUTO: 26.1 % (ref 34–48)
HGB BLD-MCNC: 7.7 G/DL (ref 11.5–15.5)
IRON SATN MFR SERPL: 24 % (ref 15–50)
IRON SERPL-MCNC: 47 UG/DL (ref 37–145)
MAGNESIUM SERPL-MCNC: 1.9 MG/DL (ref 1.6–2.6)
MCH RBC QN AUTO: 30.8 PG (ref 26–35)
MCHC RBC AUTO-ENTMCNC: 29.5 G/DL (ref 32–34.5)
MCV RBC AUTO: 104.4 FL (ref 80–99.9)
PHOSPHATE SERPL-MCNC: 3.2 MG/DL (ref 2.5–4.5)
PLATELET, FLUORESCENCE: 142 K/UL (ref 130–450)
PMV BLD AUTO: 13.1 FL (ref 7–12)
POTASSIUM SERPL-SCNC: 3.6 MMOL/L (ref 3.5–5)
RBC # BLD AUTO: 2.5 M/UL (ref 3.5–5.5)
SODIUM SERPL-SCNC: 138 MMOL/L (ref 132–146)
TIBC SERPL-MCNC: 192 UG/DL (ref 250–450)
TRANSFUSION STATUS: NORMAL
UNIT DIVISION: 0
UNIT ISSUE DATE/TIME: NORMAL
WBC OTHER # BLD: 7.3 K/UL (ref 4.5–11.5)

## 2025-05-22 PROCEDURE — 2700000000 HC OXYGEN THERAPY PER DAY

## 2025-05-22 PROCEDURE — 6370000000 HC RX 637 (ALT 250 FOR IP): Performed by: ORTHOPAEDIC SURGERY

## 2025-05-22 PROCEDURE — 6370000000 HC RX 637 (ALT 250 FOR IP): Performed by: INTERNAL MEDICINE

## 2025-05-22 PROCEDURE — 82728 ASSAY OF FERRITIN: CPT

## 2025-05-22 PROCEDURE — 6360000002 HC RX W HCPCS: Performed by: ORTHOPAEDIC SURGERY

## 2025-05-22 PROCEDURE — 85027 COMPLETE CBC AUTOMATED: CPT

## 2025-05-22 PROCEDURE — 84100 ASSAY OF PHOSPHORUS: CPT

## 2025-05-22 PROCEDURE — 83735 ASSAY OF MAGNESIUM: CPT

## 2025-05-22 PROCEDURE — 36415 COLL VENOUS BLD VENIPUNCTURE: CPT

## 2025-05-22 PROCEDURE — 99239 HOSP IP/OBS DSCHRG MGMT >30: CPT | Performed by: INTERNAL MEDICINE

## 2025-05-22 PROCEDURE — 80158 DRUG ASSAY CYCLOSPORINE: CPT

## 2025-05-22 PROCEDURE — 80048 BASIC METABOLIC PNL TOTAL CA: CPT

## 2025-05-22 PROCEDURE — 83550 IRON BINDING TEST: CPT

## 2025-05-22 PROCEDURE — 83540 ASSAY OF IRON: CPT

## 2025-05-22 PROCEDURE — 93306 TTE W/DOPPLER COMPLETE: CPT

## 2025-05-22 PROCEDURE — 2500000003 HC RX 250 WO HCPCS: Performed by: ORTHOPAEDIC SURGERY

## 2025-05-22 RX ORDER — OXYCODONE HYDROCHLORIDE 5 MG/1
10 TABLET ORAL EVERY 4 HOURS PRN
Refills: 0 | Status: DISCONTINUED | OUTPATIENT
Start: 2025-05-22 | End: 2025-05-22 | Stop reason: HOSPADM

## 2025-05-22 RX ORDER — OXYCODONE HYDROCHLORIDE 5 MG/1
5 TABLET ORAL EVERY 4 HOURS PRN
Refills: 0 | Status: DISCONTINUED | OUTPATIENT
Start: 2025-05-22 | End: 2025-05-22 | Stop reason: HOSPADM

## 2025-05-22 RX ORDER — OXYCODONE HYDROCHLORIDE 5 MG/1
5 TABLET ORAL EVERY 6 HOURS PRN
Qty: 12 TABLET | Refills: 0 | Status: SHIPPED | OUTPATIENT
Start: 2025-05-22 | End: 2025-05-25

## 2025-05-22 RX ADMIN — HYDROMORPHONE HYDROCHLORIDE 0.5 MG: 1 INJECTION, SOLUTION INTRAMUSCULAR; INTRAVENOUS; SUBCUTANEOUS at 08:05

## 2025-05-22 RX ADMIN — CEFDINIR 300 MG: 300 CAPSULE ORAL at 08:03

## 2025-05-22 RX ADMIN — CYCLOSPORINE 100 MG: 25 CAPSULE, GELATIN COATED ORAL at 08:03

## 2025-05-22 RX ADMIN — FOLIC ACID 1 MG: 1 TABLET ORAL at 08:03

## 2025-05-22 RX ADMIN — BUMETANIDE 1 MG: 1 TABLET ORAL at 08:03

## 2025-05-22 RX ADMIN — SODIUM CHLORIDE, PRESERVATIVE FREE 10 ML: 5 INJECTION INTRAVENOUS at 08:06

## 2025-05-22 RX ADMIN — LEVOTHYROXINE SODIUM 50 MCG: 0.05 TABLET ORAL at 08:03

## 2025-05-22 RX ADMIN — HYDROMORPHONE HYDROCHLORIDE 0.5 MG: 1 INJECTION, SOLUTION INTRAMUSCULAR; INTRAVENOUS; SUBCUTANEOUS at 04:38

## 2025-05-22 RX ADMIN — OXYCODONE 10 MG: 5 TABLET ORAL at 11:59

## 2025-05-22 RX ADMIN — APIXABAN 5 MG: 5 TABLET, FILM COATED ORAL at 11:59

## 2025-05-22 RX ADMIN — METOPROLOL TARTRATE 12.5 MG: 25 TABLET, FILM COATED ORAL at 08:04

## 2025-05-22 RX ADMIN — MYCOPHENOLATE MOFETIL 1000 MG: 250 CAPSULE ORAL at 08:03

## 2025-05-22 RX ADMIN — PREDNISONE 5 MG: 5 TABLET ORAL at 08:04

## 2025-05-22 ASSESSMENT — PAIN SCALES - GENERAL
PAINLEVEL_OUTOF10: 6
PAINLEVEL_OUTOF10: 10
PAINLEVEL_OUTOF10: 10
PAINLEVEL_OUTOF10: 0

## 2025-05-22 ASSESSMENT — PAIN DESCRIPTION - LOCATION
LOCATION: LEG

## 2025-05-22 ASSESSMENT — PAIN DESCRIPTION - ORIENTATION
ORIENTATION: LEFT;LOWER
ORIENTATION: LEFT
ORIENTATION: LEFT;LOWER

## 2025-05-22 ASSESSMENT — PAIN DESCRIPTION - DESCRIPTORS: DESCRIPTORS: DISCOMFORT;PRESSURE

## 2025-05-22 ASSESSMENT — PAIN - FUNCTIONAL ASSESSMENT: PAIN_FUNCTIONAL_ASSESSMENT: PREVENTS OR INTERFERES SOME ACTIVE ACTIVITIES AND ADLS

## 2025-05-22 ASSESSMENT — PAIN DESCRIPTION - PAIN TYPE: TYPE: ACUTE PAIN

## 2025-05-22 ASSESSMENT — PAIN SCALES - WONG BAKER: WONGBAKER_NUMERICALRESPONSE: NO HURT

## 2025-05-22 NOTE — CARE COORDINATION
5/22/2025 1024a ()  NOTE: DC order noted in the chart. Pt to return to [facility] at 130pm via [facility] (form completed). SW notified nursing and liaison. Pt aware of dc and pt to notify her son of the dc time. HENS & GINA completed.     Electronically signed by AG Hutchins on 5/22/2025 at 10:30 AM     Return OB visit  24-28 weeks    Subjective:   Esperanza is a 26 year old  female at 26w6d who returns for prenatal care. GAIL 2022.  - Concerns today: none  - Patient reports no vaginal bleeding, no contractions, no leakage of fluid. Fetal movement is present.   - No nausea/vomiting. No heartburn.   - No vaginal discharge. No dysuria.   - Occasional headache, but goes away with water or a nap  - No vision changes, lower extremity swelling, upper abdominal pain, chest pain, shortness of breath.   - Mood has been happy.    Objective:   /70   Pulse 81   Temp 98  F (36.7  C) (Oral)   Resp 16   Wt 83 kg (183 lb)   LMP 05/15/2021   SpO2 97%   BMI 29.76 kg/m     Const: No distress  Abd: Gravid.   See flowsheet for FH, FHTs, edema.    Prenatal labs:   Hemoglobin   Date Value Ref Range Status   10/18/2021 11.5 (L) 11.7 - 15.7 g/dL Final       Assessment & plan:   IUP at 26w6d weeks by LMP NOT consistent with first trimester ultrasound (27w3d).     - Pregnancy complicated by: late prenatal care  - Prenatal labs reviewed. Patient is Rh +: antibody re-screen and rhogam is therefore not indicated.   Second trimester hemoglobin was ordered.   - Reviewed fetal survey results with patient: Yes. Repeat ultrasound is not recommended.   - Discussed screening for gestational diabetes. 1 hour GCT will be obtained at next visit due to lab closing.  - Per [facility] recommendations, repeat syphilis screening, Anti-treponema ordered, to be completed at next visit  - Discussed need for Tdap, patient agreed to vaccination at next visit.   - Received flu shot 21  - Pregnancy weight gain has been Not found.   - Provided counseling regarding  labor symptoms, depression and social support systems, breast feeding, contraception options after delivery, baby supplies and car seat, proper seatbelt use during pregnancy. The patient plans to deliver at [facility] with myself and/or OB partner Dr. Morales. [place] [facility]  at [facility].   - Patient will continue taking prenatal vitamins and avoiding cigarettes & alcohol.       -Breastfeeding education provided:  - Cue Feeding  - Supply and Demand  - Exclusivity   - Good Latch  - Avoiding Artifical Nipples    - Return to clinic in 2 weeks.     - Specific concerns addressed today:   1. 26 weeks gestation of pregnancy  - Glucose tolerance gest screen 1 hour; Future  - Hemoglobin; Future  - Treponema Abs w Reflex to RPR and Titer; Future    Options for treatment and follow-up care were reviewed with the patient and/or guardian. Esperanza Wilder and/or guardian engaged in the decision making process and verbalized understanding of the options discussed and agreed with the final plan.    Dimitri Garcia MD

## 2025-05-22 NOTE — PROGRESS NOTES
[facility] Hospitalist   Progress Note    Admitting Date and Time: 5/18/2025 10:01 AM  Admit Dx: Leg hematoma, left, initial encounter [S80.12XA]    Subjective/interval history:    5/19: Pt admitted yesterday evening with left leg distal anterior large hematoma.  She had a mechanical fall and is on anticoagulation with Eliquis.  She was given Balfaxar in the ED.  Orthopedic surgery following and plan for evacuation of hematoma today.    Today she has some pain but fairly well-controlled.  Hemodynamically stable.    5/20: Patient had evacuation of hematoma yesterday which she tolerated well.  This morning, she has had gradual shortness of breath and now has some mild conversational dyspnea.  Oxygen requirement increased to 8 L high flow nasal cannula.    ROS: denies fever, chills, cp, sob, n/v, HA unless stated above.     cloZAPine  25 mg Oral Daily    cycloSPORINE  100 mg Oral BID    venlafaxine  150 mg Oral Daily with breakfast    ferrous sulfate  325 mg Oral Daily with breakfast    folic acid  1 mg Oral Daily    levothyroxine  50 mcg Oral Daily    metoprolol tartrate  12.5 mg Oral BID    mycophenolate  1,000 mg Oral BID    predniSONE  5 mg Oral Daily    atorvastatin  20 mg Oral Daily    sodium chloride flush  5-40 mL IntraVENous 2 times per day     HYDROmorphone, 0.5 mg, Q3H PRN  sodium chloride flush, 5-40 mL, PRN  sodium chloride, , PRN  potassium chloride, 40 mEq, PRN   Or  potassium alternative oral replacement, 40 mEq, PRN   Or  potassium chloride, 10 mEq, PRN  magnesium sulfate, 2,000 mg, PRN  ondansetron, 4 mg, Q8H PRN   Or  ondansetron, 4 mg, Q6H PRN  polyethylene glycol, 17 g, Daily PRN  acetaminophen, 650 mg, Q6H PRN   Or  acetaminophen, 650 mg, Q6H PRN         Objective:    BP 94/62   Pulse 84   Temp 98.4 °F (36.9 °C) (Oral)   Resp 16   Ht 1.575 m (5' 2\")   Wt 95.3 kg (210 lb)   SpO2 96%   BMI 38.41 kg/m²   General Appearance: Elderly female, oriented x 3, slight conversational 
       [facility] Hospitalist   Progress Note    Admitting Date and Time: 5/18/2025 10:01 AM  Admit Dx: Leg hematoma, left, initial encounter [S80.12XA]    Subjective/interval history:    Pt admitted yesterday evening with left leg distal anterior large hematoma.  She had a mechanical fall and is on anticoagulation with Eliquis.  She was given Balfaxar in the ED.  Orthopedic surgery following and plan for evacuation of hematoma today.    Today she has some pain but fairly well-controlled.  Hemodynamically stable.    ROS: denies fever, chills, cp, sob, n/v, HA unless stated above.     ceFAZolin  2,000 mg IntraVENous Q8H    ceFAZolin        cloZAPine  25 mg Oral Daily    cycloSPORINE  100 mg Oral BID    venlafaxine  150 mg Oral Daily with breakfast    ferrous sulfate  325 mg Oral Daily with breakfast    folic acid  1 mg Oral Daily    levothyroxine  50 mcg Oral Daily    metoprolol tartrate  12.5 mg Oral BID    mycophenolate  1,000 mg Oral BID    predniSONE  5 mg Oral Daily    atorvastatin  20 mg Oral Daily    sodium chloride flush  5-40 mL IntraVENous 2 times per day     ceFAZolin, ,   HYDROmorphone, 0.5 mg, Q3H PRN  sodium chloride flush, 5-40 mL, PRN  sodium chloride, , PRN  potassium chloride, 40 mEq, PRN   Or  potassium alternative oral replacement, 40 mEq, PRN   Or  potassium chloride, 10 mEq, PRN  magnesium sulfate, 2,000 mg, PRN  ondansetron, 4 mg, Q8H PRN   Or  ondansetron, 4 mg, Q6H PRN  polyethylene glycol, 17 g, Daily PRN  acetaminophen, 650 mg, Q6H PRN   Or  acetaminophen, 650 mg, Q6H PRN         Objective:    BP (!) 106/54   Pulse 97   Temp 98.6 °F (37 °C) (Oral)   Resp 18   Ht 1.575 m (5' 2\")   Wt 95.3 kg (210 lb)   SpO2 90%   BMI 38.41 kg/m²   General Appearance: Elderly female, oriented x 3, anxious  Skin: Left distal lower extremity with extensive ecchymoses  Head: normocephalic and atraumatic  Eyes: Extraocular eye movements intact, conjunctivae normal  Neck: Trachea midline, no 
  Physician Progress Note      PATIENT:               JOHN DIAL  CSN #:                  [id number]  :                       1955  ADMIT DATE:       2025 10:01 AM  DISCH DATE:  RESPONDING  PROVIDER #:        Jeovany Hankins DO          QUERY TEXT:    Left lower leg hematoma is documented in the medical record by Dr. Macias on    and Dr. Renner on .  The patient is on Eliquis.  Please clarify the   relationship, if any.    The clinical indicators include:  - \"she tripped and fell onto her walker, hitting her left anterior shin...she   did take her Eliquis this morning at 8 AM\" (Per Dr. Macias in H&P)  - \"CT left leg showed large hematoma\" (Per Dr. Macias in H&P)  - Given Balfaxar on  and Eliquis held (Per MAR)  Options provided:  -- Left lower leg hematoma related to Eliquis  -- Bleeding unrelated to anticoagulation  -- Other - I will add my own diagnosis  -- Disagree - Not applicable / Not valid  -- Disagree - Clinically unable to determine / Unknown  -- Refer to Clinical Documentation Reviewer    PROVIDER RESPONSE TEXT:    This patient has a Left lower leg hematoma related to Eliquis.    Query created by: Ana Riojas on 2025 12:33 PM      Electronically signed by:  Jeovany Hankins DO 2025 11:02 AM          
4 Eyes Skin Assessment     NAME:  Natali Wolff  YOB: 1955  MEDICAL RECORD NUMBER:  [id number]    The patient is being assessed for  Admission    I agree that at least one RN has performed a thorough Head to Toe Skin Assessment on the patient. ALL assessment sites listed below have been assessed.      Areas assessed by both nurses:    Head, Face, Ears, Shoulders, Back, Chest, Arms, Elbows, Hands, Sacrum. Buttock, Coccyx, Ischium, Legs. Feet and Heels, Under Medical Devices , and Other GENERALIZED BRUISING TO UPPER AND LOWER ARMS AND LEGS, LT LEG RED AND LARGE HEMATOMA AND BRUISING, QUARTER SIZE BLISTER, TOE INJURY 4TH AND 5TH DIGIT OF LT FOOT        Does the Patient have a Wound? Yes wound(s) were present on assessment. LDA wound assessment was Initiated and completed by RN       Nile Prevention initiated by RN: Yes  Wound Care Orders initiated by RN: No    Pressure Injury (Stage 3,4, Unstageable, DTI, NWPT, and Complex wounds) if present, place Wound referral order by RN under : No    New Ostomies, if present place, Ostomy referral order under : No     Nurse 1 eSignature: Electronically signed by Meche Stewart RN on 5/19/25 at 12:47 AM EDT    **SHARE this note so that the co-signing nurse can place an eSignature**    Nurse 2 eSignature: Electronically signed by Nile Rodgers RN on 5/19/25 at 12:48 AM EDT   
Attempted tx with pt, however pt is currently discharged.  BC Michelle/GERMAN #[id number]      
Echo attempted bedside, pt getting wound care. Will try again later  
Left pedal pulse remains 2+, with patient having no c/o tingling/numbness. Cap refill remains less than 3 seconds and skin is warm.  Wound vac remains intact at -125mm/hg. C/o pain in LLE and this nurse will medicate with PRN Dilaudid.   
OCCUPATIONAL THERAPY INITIAL EVALUATION    [facility]  [address] [place]        Date:2025                                                  Patient Name: Natali Wolff    MRN: [id number]    : 1955    Room: [address] [place] [address]      Evaluating OT: Kalin Reynolds OTR/L; #[id number]     Referring Provider and Specific Provider Orders/Date:      25 0800  OT eval and treat  Start:  25 0800,   End:  25 0800,   ONE TIME,   Standing Count:  1 Occurrences,   R         Jeovany Hankins DO        Placement Recommendation: Subacute Rehab       Diagnosis:   1. Fall, initial encounter    2. Injury of left lower extremity, initial encounter    3. Hematoma of left lower extremity, initial encounter    4. Acute decompensated heart failure (HCC)        Procedure Summary         Date: 25 Room / Location: [address]     Anesthesia Start: 1334 Anesthesia Stop: 1440     Procedure: EVACUATION HEMATOMA LEFT LEG (Left: Leg Lower) Diagnosis:       Injury of left lower extremity, initial encounter      (Injury of left lower extremity, initial encounter [S89.92XA])     Surgeons: Goldy Renner MD Responsible Provider: Duke Zamora DO     Anesthesia Type: general ASA Status: 4        Pertinent Medical History:       Past Medical History:   Diagnosis Date    Abnormal EKG     left bundle branch block    Acute cystitis without hematuria 2023    Acute cystitis without hematuria 2023    Acute exacerbation of chronic obstructive pulmonary disease (COPD) (HCC) 2022    Acute gout involving toe of right foot 2020    Acute gout involving toe of right foot 2020    Acute metabolic encephalopathy     Acute on chronic combined systolic (congestive) and diastolic (congestive) heart failure (HCC)     Acute pancreatitis 2024    asthmatic bronchitis 2019    Bipolar 
Procedure consent signed by patient.   
Progress Note  Date:2025       Room:[facility]/0638-01  Patient Name:Natali Wolff     YOB: 1955     Age:70 y.o.        Subjective    Subjective:  Symptoms:  (She is lying comfortably in bed.  She states that she is not really having any increase in pain).     Review of Systems   All other systems reviewed and are negative.  Objective         Vitals Last 24 Hours:  TEMPERATURE:  Temp  Av.2 °F (36.8 °C)  Min: 97.5 °F (36.4 °C)  Max: 99.1 °F (37.3 °C)  RESPIRATIONS RANGE: Resp  Av.9  Min: 10  Max: 28  PULSE OXIMETRY RANGE: SpO2  Av.7 %  Min: 82 %  Max: 96 %  PULSE RANGE: Pulse  Av.5  Min: 84  Max: 113  BLOOD PRESSURE RANGE: Systolic (24hrs), Av , Min:84 , Max:126   ; Diastolic (24hrs), Av, Min:52, Max:85    I/O (24Hr):    Intake/Output Summary (Last 24 hours) at 2025 0733  Last data filed at 2025 0459  Gross per 24 hour   Intake 300 ml   Output 500 ml   Net -200 ml     Objective:  General Appearance:  General patient appearance: She can freely wiggle her toes and her ankle up and down the wound VAC and dressing is in place.    Vital signs: (most recent): Blood pressure 94/62, pulse 84, temperature 98.4 °F (36.9 °C), temperature source Oral, resp. rate 16, height 1.575 m (5' 2\"), weight 95.3 kg (210 lb), SpO2 96%.    Labs/Imaging/Diagnostics    Labs:  CBC:  Recent Labs     25  0426   WBC 12.2*  --  9.5   RBC 3.65  --  3.08*   HGB 11.3* 10.0* 9.4*   HCT 38.3 33.3* 32.1*   .9*  --  104.2*   RDW 14.7  --  14.9     CHEMISTRIES:  Recent Labs     25  0426    140   K 4.2 4.2   CL 99 104   CO2 21* 25   BUN 29* 27*   CREATININE 1.7* 1.6*   GLUCOSE 130* 127*     PT/INR:  Recent Labs     25  0426   PROTIME 12.2   INR 1.1     APTT:No results for input(s): \"APTT\" in the last 72 hours.  LIVER PROFILE:No results for input(s): \"AST\", \"ALT\", \"BILIDIR\", \"BILITOT\", \"ALKPHOS\" in the last 72 
Progress Note  Date:2025       Room:[facility]0638-  Patient Name:Natali Wolff     YOB: 1955     Age:70 y.o.        Subjective    Subjective:  Symptoms:  (Patient comfortable.  No new complaints).     Review of Systems  Objective         Vitals Last 24 Hours:  TEMPERATURE:  Temp  Av.2 °F (36.8 °C)  Min: 97.9 °F (36.6 °C)  Max: 98.6 °F (37 °C)  RESPIRATIONS RANGE: Resp  Av.3  Min: 15  Max: 18  PULSE OXIMETRY RANGE: SpO2  Av.1 %  Min: 90 %  Max: 98 %  PULSE RANGE: Pulse  Av.6  Min: 77  Max: 89  BLOOD PRESSURE RANGE: Systolic (24hrs), Av , Min:103 , Max:123   ; Diastolic (24hrs), Av, Min:52, Max:64    I/O (24Hr):    Intake/Output Summary (Last 24 hours) at 2025 1215  Last data filed at 2025 1031  Gross per 24 hour   Intake 240 ml   Output 2350 ml   Net -2110 ml     Objective:  General Appearance:  General patient appearance: Wound VAC just changed.  The photos by wound care were reviewed.    Vital signs: (most recent): Blood pressure (!) 110/56, pulse 81, temperature 97.9 °F (36.6 °C), temperature source Oral, resp. rate 16, height 1.575 m (5' 2\"), weight 95.3 kg (210 lb), SpO2 91%.    Labs/Imaging/Diagnostics    Labs:  CBC:  Recent Labs     25  0947 25  0639   WBC 9.5 11.3 7.6   RBC 3.08* 2.46* 2.22*   HGB 9.4* 7.6* 6.8*   HCT 32.1* 25.7* 23.5*   .2* 104.5* 105.9*   RDW 14.9 15.0 14.8   PLT  --  102*  --      CHEMISTRIES:  Recent Labs     25  0947 25  0639    140 140   K 4.2 5.4* 4.1    105 104   CO2 25 22 28   BUN 27* 29* 35*   CREATININE 1.6* 1.7* 1.8*   GLUCOSE 127* 133* 93   MG  --   --  2.1     PT/INR:  Recent Labs     25  0426   PROTIME 12.2   INR 1.1     APTT:No results for input(s): \"APTT\" in the last 72 hours.  LIVER PROFILE:No results for input(s): \"AST\", \"ALT\", \"BILIDIR\", \"BILITOT\", \"ALKPHOS\" in the last 72 hours.    Imaging Last 24 Hours:  XR CHEST PORTABLE  Result 
Daily PRN  acetaminophen, 650 mg, Q6H PRN   Or  acetaminophen, 650 mg, Q6H PRN         Objective:    BP (!) 115/57   Pulse 84   Temp 98.6 °F (37 °C)   Resp 16   Ht 1.575 m (5' 2\")   Wt 95.3 kg (210 lb)   SpO2 97%   BMI 38.41 kg/m²   General Appearance: Elderly female, oriented x 3, conversational dyspnea has resolved  Skin: Left distal lower extremity with wound VAC in place, moderate amount of serosanguineous fluid in canister  Head: normocephalic and atraumatic  Eyes: Extraocular eye movements intact, conjunctivae normal  Neck: Trachea midline  Pulmonary/Chest: Normal effort on 2 L nasal cannula.  Mildly diminished otherwise clear to auscultation bilateral without wheezes, crackles, rhonchi  Cardiovascular: normal rate, normal S1 and S2, 1/6 systolic murmur  Abdomen: soft, non-tender, non-distended, normal bowel sounds, no masses or organomegaly  Extremities: Left lower extremity with wound VAC in place.  Right lower extremity trace pedal edema.  No peripheral cyanosis.  No digital clubbing  Neurologic: no cranial nerve deficit and speech normal.  Sensation intact distal extremities      Recent Labs     05/19/25  0426 05/20/25  0947 05/21/25  0639    140 140   K 4.2 5.4* 4.1    105 104   CO2 25 22 28   BUN 27* 29* 35*   CREATININE 1.6* 1.7* 1.8*   GLUCOSE 127* 133* 93   CALCIUM 9.5 9.5 9.5       Recent Labs     05/19/25  0426 05/20/25  0947 05/21/25  0639   WBC 9.5 11.3 7.6   RBC 3.08* 2.46* 2.22*   HGB 9.4* 7.6* 6.8*   HCT 32.1* 25.7* 23.5*   .2* 104.5* 105.9*   MCH 30.5 30.9 30.6   MCHC 29.3* 29.6* 28.9*   RDW 14.9 15.0 14.8   PLT  --  102*  --    MPV 13.4* 13.1* 13.6*       Radiology:   XR CHEST PORTABLE   Final Result   Ill-defined opacification seen within the left lung base with small left   pleural effusion.         CT TIBIA FIBULA LEFT W CONTRAST   Final Result   1. Large hematoma at the anterior and anterolateral aspect of the left tibia   and fibula measuring 11 x 5 x 17.1 cm.  
to your side while in a flat bed without using bedrails?: A Lot  How much help is needed moving from lying on your back to sitting on the side of a flat bed without using bedrails?: Total  How much help is needed moving to and from a bed to a chair?: Total  How much help is needed standing up from a chair using your arms?: Total  How much help is needed walking in hospital room?: Total  How much help is needed climbing 3-5 steps with a railing?: Total  AM-PAC Inpatient Mobility Raw Score : 7  AM-PAC Inpatient T-Scale Score : 26.42  Mobility Inpatient CMS 0-100% Score: 92.36  Mobility Inpatient CMS G-Code Modifier : CM    Nursing cleared patient for PT evaluation. The admitting diagnosis and active problem list as listed above have been reviewed prior to the initiation of this evaluation.    OBJECTIVE:   Initial Evaluation  Date: 5/21/2025 Treatment Date:     Short Term/ Long Term   Goals   Was pt agreeable to Eval/treatment? Yes  To be met in 2 days   Pain level   8/10  R hand from IV     Bed Mobility  Using rails and head of bed elevated:     Rolling: Maximal assist of 1    Supine to sit: Not assessed     Sit to supine: Not assessed     Scooting: Not assessed     Rolling: Minimal assist of 1    Supine to sit: Minimal assist of 1    Sit to supine: Minimal assist of 1    Scooting: Minimal assist of 1     Transfers Sit to stand: Not assessed    Sit to stand: Minimal assist of 1    Ambulation    not assessed     > 25 feet using  wheeled walker with Minimal assist of 1    ROM Within functional limits    Increase range of motion 10% of affected joints    Strength BUE:  refer to OT eval  RLE:  3-/5  LLE:   2/5  Increase strength in affected mm groups by 1/3 grade   Balance Sitting EOB:  not assessed  Dynamic Standing:  not assessed   Sitting EOB:  fair   Dynamic Standing: fair with WW     Patient is Alert & Oriented x person, time, and situation and follows directions pleasantly confused   Sensation:  Patient  denies

## 2025-05-22 NOTE — DISCHARGE SUMMARY
(LOPRESSOR) 25 MG tablet 0.5mg bid, Disp-90 tablet, R-1Normal      bumetanide (BUMEX) 1 MG tablet Take 1 tablet by mouth daily, Disp-90 tablet, R-3Normal      predniSONE (DELTASONE) 5 MG tablet Take 1 tablet by mouth daily, Disp-90 tablet, R-1Normal      apixaban (ELIQUIS) 5 MG TABS tablet Take 1 tablet by mouth 2 times daily, Disp-180 tablet, R-3Normal      !! NONFORMULARY PROBIOTIC ACIDophilusHistorical Med      !! NONFORMULARY daily D-Mannose 1300mgHistorical Med      simvastatin (ZOCOR) 40 MG tablet Take 1 tablet by mouth nightly, Disp-90 tablet, R-2Normal      acetaminophen (TYLENOL) 325 MG tablet Take 2 tablets by mouth every 6 hours as needed for PainHistorical Med      cycloSPORINE (SANDIMMUNE) 100 MG capsule Take 1 capsule by mouth 2 times dailyHistorical Med      desvenlafaxine succinate (PRISTIQ) 25 MG TB24 extended release tablet Take 4 tablets by mouth dailyHistorical Med      cloZAPine (CLOZARIL) 25 MG tablet Take 1 tablet by mouth dailyHistorical Med      ferrous sulfate (IRON 325) 325 (65 Fe) MG tablet Take 1 tablet by mouth daily (with breakfast)Historical Med      mycophenolate (CELLCEPT) 500 MG tablet Take 2 tablets by mouth 2 times dailyHistorical Med       !! - Potential duplicate medications found. Please discuss with provider.        STOP taking these medications       magnesium oxide (MAG-OX) 400 MG tablet Comments:   Reason for Stopping:                 Note  that  37  minutes were spent in preparing discharge papers, discussing discharge with patient, medication review, discussing with consultants, etc.    NOTE: This report was transcribed using voice recognition software. Every effort was made to ensure accuracy; however, inadvertent computerized transcription errors may be present.     Signed:  Electronically signed by Jeovany Hankins DO on 5/22/2025 at 3:02 PM

## 2025-05-22 NOTE — PLAN OF CARE
Problem: Chronic Conditions and Co-morbidities  Goal: Patient's chronic conditions and co-morbidity symptoms are monitored and maintained or improved  5/22/2025 1005 by Gómez Garcia RN  Outcome: Progressing  Flowsheets (Taken 5/22/2025 0800)  Care Plan - Patient's Chronic Conditions and Co-Morbidity Symptoms are Monitored and Maintained or Improved:   Monitor and assess patient's chronic conditions and comorbid symptoms for stability, deterioration, or improvement   Collaborate with multidisciplinary team to address chronic and comorbid conditions and prevent exacerbation or deterioration   Update acute care plan with appropriate goals if chronic or comorbid symptoms are exacerbated and prevent overall improvement and discharge  5/22/2025 0148 by Alta Pérez RN  Outcome: Progressing  Flowsheets (Taken 5/22/2025 0057)  Care Plan - Patient's Chronic Conditions and Co-Morbidity Symptoms are Monitored and Maintained or Improved:   Monitor and assess patient's chronic conditions and comorbid symptoms for stability, deterioration, or improvement   Collaborate with multidisciplinary team to address chronic and comorbid conditions and prevent exacerbation or deterioration   Update acute care plan with appropriate goals if chronic or comorbid symptoms are exacerbated and prevent overall improvement and discharge     Problem: Discharge Planning  Goal: Discharge to home or other facility with appropriate resources  5/22/2025 1005 by Gómez Garcia RN  Outcome: Progressing  Flowsheets (Taken 5/22/2025 0800)  Discharge to home or other facility with appropriate resources:   Identify barriers to discharge with patient and caregiver   Arrange for needed discharge resources and transportation as appropriate   Identify discharge learning needs (meds, wound care, etc)  5/22/2025 0148 by Alta Pérez, RN  Outcome: Progressing  Flowsheets (Taken 5/22/2025 0057)  Discharge to home or other facility with appropriate resources:

## 2025-05-22 NOTE — PLAN OF CARE
Problem: Chronic Conditions and Co-morbidities  Goal: Patient's chronic conditions and co-morbidity symptoms are monitored and maintained or improved  Outcome: Progressing     Problem: Discharge Planning  Goal: Discharge to home or other facility with appropriate resources  Outcome: Progressing     Problem: ABCDS Injury Assessment  Goal: Absence of physical injury  Outcome: Progressing  Flowsheets (Taken 5/22/2025 0057)  Absence of Physical Injury: Implement safety measures based on patient assessment     Problem: Safety - Adult  Goal: Free from fall injury  Outcome: Progressing     Problem: Pain  Goal: Verbalizes/displays adequate comfort level or baseline comfort level  Outcome: Progressing  Flowsheets (Taken 5/22/2025 0057)  Verbalizes/displays adequate comfort level or baseline comfort level:   Encourage patient to monitor pain and request assistance   Assess pain using appropriate pain scale   Administer analgesics based on type and severity of pain and evaluate response   Implement non-pharmacological measures as appropriate and evaluate response   Notify Licensed Independent Practitioner if interventions unsuccessful or patient reports new pain     Problem: Nutrition Deficit:  Goal: Optimize nutritional status  5/22/2025 0148 by Alta Pérez RN  Outcome: Progressing  5/21/2025 1314 by Eri Tripathi RD, LD  Outcome: Progressing  Flowsheets (Taken 5/21/2025 1314)  Nutrient intake appropriate for improving, restoring, or maintaining nutritional needs:   Assess nutritional status and recommend course of action   Recommend appropriate diets, oral nutritional supplements, and vitamin/mineral supplements   Order, calculate, and assess calorie counts as needed   Monitor oral intake, labs, and treatment plans     Problem: Skin/Tissue Integrity  Goal: Skin integrity remains intact  Description: 1.  Monitor for areas of redness and/or skin breakdown2.  Assess vascular access sites hourly3.  Every 4-6 hours

## 2025-05-23 LAB
CYCLOSPORINE BLD-MCNC: 102 UG/L (ref 50–300)
MICROORGANISM SPEC CULT: NORMAL
MICROORGANISM SPEC CULT: NORMAL
MICROORGANISM/AGENT SPEC: NORMAL
MICROORGANISM/AGENT SPEC: NORMAL
SERVICE CMNT-IMP: NORMAL
SERVICE CMNT-IMP: NORMAL
SPECIMEN DESCRIPTION: NORMAL
SPECIMEN DESCRIPTION: NORMAL

## 2025-05-24 LAB
MICROORGANISM SPEC CULT: NORMAL
SERVICE CMNT-IMP: NORMAL
SPECIMEN DESCRIPTION: NORMAL

## 2025-05-28 ENCOUNTER — OUTSIDE SERVICES (OUTPATIENT)
Dept: PRIMARY CARE CLINIC | Age: 70
End: 2025-05-28
Payer: MEDICARE

## 2025-05-28 DIAGNOSIS — Z48.817 ENCOUNTER FOR SURGICAL AFTERCARE FOLLOWING SURGERY ON THE SKIN AND SUBCUTANEOUS TISSUE: Primary | ICD-10-CM

## 2025-05-28 DIAGNOSIS — I10 PRIMARY HYPERTENSION: ICD-10-CM

## 2025-05-28 DIAGNOSIS — S80.12XD CONTUSION OF LEFT LOWER LEG, SUBSEQUENT ENCOUNTER: ICD-10-CM

## 2025-05-28 DIAGNOSIS — Z21 ASYMPTOMATIC HIV INFECTION, WITH NO HISTORY OF HIV-RELATED ILLNESS (HCC): ICD-10-CM

## 2025-05-28 DIAGNOSIS — J44.1 CHRONIC OBSTRUCTIVE PULMONARY DISEASE WITH (ACUTE) EXACERBATION (HCC): ICD-10-CM

## 2025-05-28 DIAGNOSIS — R53.1 WEAKNESS: ICD-10-CM

## 2025-05-28 DIAGNOSIS — E03.9 ACQUIRED HYPOTHYROIDISM: ICD-10-CM

## 2025-05-28 DIAGNOSIS — I48.0 PAROXYSMAL ATRIAL FIBRILLATION (HCC): ICD-10-CM

## 2025-05-28 DIAGNOSIS — I50.22 CHRONIC SYSTOLIC (CONGESTIVE) HEART FAILURE (HCC): ICD-10-CM

## 2025-05-28 DIAGNOSIS — E78.2 MIXED HYPERLIPIDEMIA: ICD-10-CM

## 2025-05-28 LAB
MICROORGANISM SPEC CULT: NORMAL
MICROORGANISM/AGENT SPEC: NORMAL
SERVICE CMNT-IMP: NORMAL
SPECIMEN DESCRIPTION: NORMAL

## 2025-05-28 PROCEDURE — 99306 1ST NF CARE HIGH MDM 50: CPT | Performed by: INTERNAL MEDICINE

## 2025-06-03 ASSESSMENT — ENCOUNTER SYMPTOMS
EYE REDNESS: 0
RHINORRHEA: 0
VOICE CHANGE: 0
COUGH: 0
SHORTNESS OF BREATH: 0
EYE DISCHARGE: 0
SORE THROAT: 0
EYE ITCHING: 0
SINUS PRESSURE: 0
SINUS PAIN: 0
TROUBLE SWALLOWING: 0
WHEEZING: 0

## 2025-06-03 ASSESSMENT — VISUAL ACUITY: OU: 1

## 2025-06-03 NOTE — PROGRESS NOTES
Visit Date: 5/28/25  Natali Wolff  1955  female 70 y.o.      Subjective:    CC: Patient presents as readmit from fall resulting LLE hematoma. Patient presents for follow up of htn, hyperlipidemia, afib, chf, hypothyroidism .pancreatitis    HPI:  Hypertension  This is a chronic problem. The current episode started more than 1 year ago. The problem is unchanged. The problem is controlled. Pertinent negatives include no shortness of breath. There are no associated agents to hypertension. Risk factors for coronary artery disease include dyslipidemia and obesity. Treatments tried: taking medicatinos, no medication side effects. The current treatment provides mild improvement. There are no compliance problems.  Identifiable causes of hypertension include a thyroid problem.   Hyperlipidemia  This is a chronic problem. The current episode started more than 1 year ago. The problem is controlled. Recent lipid tests were reviewed and are variable. There are no known factors aggravating her hyperlipidemia. Pertinent negatives include no shortness of breath. Treatments tried: taking medications, no medication side effects. The current treatment provides mild improvement of lipids. There are no compliance problems.  Risk factors for coronary artery disease include dyslipidemia, hypertension and obesity.   Atrial Fibrillation  Presents for follow-up visit. Symptoms are negative for shortness of breath. The symptoms have been stable. Compliance problems: taking medications, no medication side effects.   Congestive Heart Failure  Presents for follow-up visit. Pertinent negatives include no shortness of breath. The symptoms have been worsening. Compliance with total regimen is 26-50%. Compliance with diet is 26-50%. Compliance with exercise is 26-50%. Compliance with medications is 26-50%.   Thyroid Problem  Presents for follow-up (hypothyroidism taking medications, no medication side effects) visit. Patient reports no

## 2025-06-04 ENCOUNTER — OUTSIDE SERVICES (OUTPATIENT)
Dept: PRIMARY CARE CLINIC | Age: 70
End: 2025-06-04

## 2025-06-04 DIAGNOSIS — R53.1 WEAKNESS: ICD-10-CM

## 2025-06-04 DIAGNOSIS — I10 PRIMARY HYPERTENSION: ICD-10-CM

## 2025-06-04 DIAGNOSIS — J44.1 CHRONIC OBSTRUCTIVE PULMONARY DISEASE WITH (ACUTE) EXACERBATION (HCC): ICD-10-CM

## 2025-06-04 DIAGNOSIS — N30.00 ACUTE CYSTITIS WITHOUT HEMATURIA: ICD-10-CM

## 2025-06-04 DIAGNOSIS — Z48.817 ENCOUNTER FOR SURGICAL AFTERCARE FOLLOWING SURGERY ON THE SKIN AND SUBCUTANEOUS TISSUE: Primary | ICD-10-CM

## 2025-06-04 DIAGNOSIS — I48.0 PAROXYSMAL ATRIAL FIBRILLATION (HCC): ICD-10-CM

## 2025-06-04 DIAGNOSIS — S80.12XD CONTUSION OF LEFT LOWER LEG, SUBSEQUENT ENCOUNTER: ICD-10-CM

## 2025-06-04 DIAGNOSIS — E78.2 MIXED HYPERLIPIDEMIA: ICD-10-CM

## 2025-06-04 DIAGNOSIS — E03.9 ACQUIRED HYPOTHYROIDISM: ICD-10-CM

## 2025-06-04 DIAGNOSIS — I50.22 CHRONIC SYSTOLIC (CONGESTIVE) HEART FAILURE (HCC): ICD-10-CM

## 2025-06-04 DIAGNOSIS — Z21 ASYMPTOMATIC HIV INFECTION, WITH NO HISTORY OF HIV-RELATED ILLNESS (HCC): ICD-10-CM

## 2025-06-05 ENCOUNTER — TELEPHONE (OUTPATIENT)
Dept: OTHER | Age: 70
End: 2025-06-05

## 2025-06-05 NOTE — TELEPHONE ENCOUNTER
Patient called into the [facility]F Clinic stating she has a UTI and is inquiring if she should stop her Jardiance. She was advised that a nurse is unable to give her medication recommendations and that she would need to consult her physician. Oralia stated she has an appointment coming up with an infectious disease specialist on Monday and she is planning on discussing it with him at that time.

## 2025-06-06 ASSESSMENT — ENCOUNTER SYMPTOMS
RHINORRHEA: 0
EYE REDNESS: 0
COUGH: 0
SHORTNESS OF BREATH: 0
SORE THROAT: 0
EYE ITCHING: 0
TROUBLE SWALLOWING: 0
EYE DISCHARGE: 0
VOICE CHANGE: 0
WHEEZING: 0
SINUS PRESSURE: 0
SINUS PAIN: 0

## 2025-06-06 ASSESSMENT — VISUAL ACUITY: OU: 1

## 2025-06-06 NOTE — PROGRESS NOTES
Visit Date: 6/4/25  Natali Wolff  1955  female 70 y.o.      Subjective:    CC: Patient presents as readmit from fall resulting LLE hematoma. Patient presents for follow up of htn, hyperlipidemia, afib, chf, hypothyroidism .pancreatitis    HPI:  Hypertension  This is a chronic problem. The current episode started more than 1 year ago. The problem is unchanged. The problem is controlled. Pertinent negatives include no shortness of breath. There are no associated agents to hypertension. Risk factors for coronary artery disease include dyslipidemia and obesity. Treatments tried: taking medicatinos, no medication side effects. The current treatment provides mild improvement. There are no compliance problems.  Identifiable causes of hypertension include a thyroid problem.   Hyperlipidemia  This is a chronic problem. The current episode started more than 1 year ago. The problem is controlled. Recent lipid tests were reviewed and are variable. There are no known factors aggravating her hyperlipidemia. Pertinent negatives include no shortness of breath. Treatments tried: taking medications, no medication side effects. The current treatment provides mild improvement of lipids. There are no compliance problems.  Risk factors for coronary artery disease include dyslipidemia, hypertension and obesity.   Atrial Fibrillation  Presents for follow-up visit. Symptoms are negative for shortness of breath. The symptoms have been stable. Past medical history includes CHF and hyperlipidemia. Compliance problems: taking medications, no medication side effects.   Congestive Heart Failure  Presents for follow-up visit. Pertinent negatives include no shortness of breath. The symptoms have been worsening. Compliance with total regimen is 26-50%. Compliance with diet is 26-50%. Compliance with exercise is 26-50%. Compliance with medications is 26-50%.   Thyroid Problem  Presents for follow-up (hypothyroidism taking medications,

## 2025-06-09 ENCOUNTER — TELEPHONE (OUTPATIENT)
Facility: HOSPITAL | Age: 70
End: 2025-06-09

## 2025-06-09 NOTE — TELEPHONE ENCOUNTER
Patient called requesting to speak with coordinator about rescheduling an appointment with puk. I called patient and lvm to get her rescheduled..  Patient call back number is [phone].

## 2025-06-10 ENCOUNTER — OUTSIDE SERVICES (OUTPATIENT)
Dept: PRIMARY CARE CLINIC | Age: 70
End: 2025-06-10
Payer: MEDICARE

## 2025-06-10 ENCOUNTER — OFFICE VISIT (OUTPATIENT)
Age: 70
End: 2025-06-10

## 2025-06-10 VITALS — BODY MASS INDEX: 38.64 KG/M2 | TEMPERATURE: 97.9 F | HEIGHT: 62 IN | WEIGHT: 210 LBS

## 2025-06-10 DIAGNOSIS — I48.0 PAROXYSMAL ATRIAL FIBRILLATION (HCC): ICD-10-CM

## 2025-06-10 DIAGNOSIS — R53.1 WEAKNESS: ICD-10-CM

## 2025-06-10 DIAGNOSIS — Z48.817 ENCOUNTER FOR SURGICAL AFTERCARE FOLLOWING SURGERY ON THE SKIN AND SUBCUTANEOUS TISSUE: Primary | ICD-10-CM

## 2025-06-10 DIAGNOSIS — E03.9 ACQUIRED HYPOTHYROIDISM: ICD-10-CM

## 2025-06-10 DIAGNOSIS — S80.12XD CONTUSION OF LEFT LOWER LEG, SUBSEQUENT ENCOUNTER: ICD-10-CM

## 2025-06-10 DIAGNOSIS — I10 PRIMARY HYPERTENSION: ICD-10-CM

## 2025-06-10 DIAGNOSIS — J44.1 CHRONIC OBSTRUCTIVE PULMONARY DISEASE WITH (ACUTE) EXACERBATION (HCC): ICD-10-CM

## 2025-06-10 DIAGNOSIS — S80.12XD HEMATOMA OF LEFT LOWER EXTREMITY, SUBSEQUENT ENCOUNTER: Primary | ICD-10-CM

## 2025-06-10 DIAGNOSIS — E78.2 MIXED HYPERLIPIDEMIA: ICD-10-CM

## 2025-06-10 DIAGNOSIS — I50.22 CHRONIC SYSTOLIC (CONGESTIVE) HEART FAILURE (HCC): ICD-10-CM

## 2025-06-10 DIAGNOSIS — Z21 ASYMPTOMATIC HIV INFECTION, WITH NO HISTORY OF HIV-RELATED ILLNESS (HCC): ICD-10-CM

## 2025-06-10 PROCEDURE — 99316 NF DSCHRG MGMT 30 MIN+: CPT | Performed by: INTERNAL MEDICINE

## 2025-06-10 PROCEDURE — 99024 POSTOP FOLLOW-UP VISIT: CPT | Performed by: ORTHOPAEDIC SURGERY

## 2025-06-10 RX ORDER — CYCLOSPORINE 25 MG/1
25 CAPSULE, LIQUID FILLED ORAL 2 TIMES DAILY
COMMUNITY
Start: 2025-05-21

## 2025-06-10 NOTE — PROGRESS NOTES
Ms. Keyon Wolff returns today for follow-up of a  Left lower leg.  she reports improvement in the pain.  She is in the facility and she should be going home soon.    Physical Exam:    Incisions: The upper portion of the wound has healed well.  The bruised areas the skin is all viable and pink.  Distally there are still some areas with the skin tears but there is fibrinous material with granulation tissue underneath.       X-Ray:  none taken    Radiographic findings reviewed with patient    Impression:   1. Hematoma of left lower extremity, subsequent encounter  -     [facility], [place]         Plan:    Her leg looks really good.  They are really not doing much wound care or micro debridement they just have somebody coming in once a week to do assessments.  They never did do the wound VAC.  However the skin and everything looks really good.  When she goes home she needs referred to the wound care clinic.  At this point I will defer the care to the wound care clinic  No follow-ups on file.     Goldy Renner MD

## 2025-06-10 NOTE — TELEPHONE ENCOUNTER
Patient scheduled for nephrology as requested by coordinator.   Location, date, and instructions given to patient.   Advised patient that if any changes to appointment are needed, they must call the office so we are aware.   Name: Jenny Artis MRN: [id number]  Date: 7/16/2025 Status: [facility]  Time: 1:40 PM    Arrive By:        Length: 20    Visit Type: TXP KIDNEY PHYSICIAN [[id number]] Copay: $0.00  Provider: TXP KIDNEY PHYSICIAN [place] [address] Department: [address]      Department Address: [address] [place] [id number]  CSN: [id number]    Bill Area:     Referral Number:        Referring Provider:   Referral Status:    MARSHALL: JENNY ARTIS [[id number]]      Notes: 3 month follow up with puk  Rescheduled: 6/10/2025 11:28 AM By: ALMA MAZARIEGOS [[id number]] (ES)

## 2025-06-11 ENCOUNTER — APPOINTMENT (OUTPATIENT)
Dept: TRANSPLANT | Facility: CLINIC | Age: 70
End: 2025-06-11
Payer: MEDICARE

## 2025-06-11 ENCOUNTER — CLINICAL DOCUMENTATION (OUTPATIENT)
Dept: INFECTIOUS DISEASES | Age: 70
End: 2025-06-11

## 2025-06-11 ASSESSMENT — ENCOUNTER SYMPTOMS
SHORTNESS OF BREATH: 0
EYE DISCHARGE: 0
EYE ITCHING: 0
RHINORRHEA: 0
VOICE CHANGE: 0
TROUBLE SWALLOWING: 0
SINUS PAIN: 0
EYE REDNESS: 0
COUGH: 0
SINUS PRESSURE: 0
SORE THROAT: 0
WHEEZING: 0

## 2025-06-11 ASSESSMENT — VISUAL ACUITY: OU: 1

## 2025-06-11 NOTE — PROGRESS NOTES
Visit Date: 6/10/25  Natali Wolff  1955  female 70 y.o.      Subjective:    CC: Patient presents as readmit from fall resulting LLE hematoma. Patient presents for follow up of htn, hyperlipidemia, afib, chf, hypothyroidism .pancreatitis    HPI:  Hypertension  This is a chronic problem. The current episode started more than 1 year ago. The problem is unchanged. The problem is controlled. Pertinent negatives include no shortness of breath. There are no associated agents to hypertension. Risk factors for coronary artery disease include dyslipidemia and obesity. Treatments tried: taking medicatinos, no medication side effects. The current treatment provides mild improvement. There are no compliance problems.  Identifiable causes of hypertension include a thyroid problem.   Hyperlipidemia  This is a chronic problem. The current episode started more than 1 year ago. The problem is controlled. Recent lipid tests were reviewed and are variable. There are no known factors aggravating her hyperlipidemia. Pertinent negatives include no shortness of breath. Treatments tried: taking medications, no medication side effects. The current treatment provides mild improvement of lipids. There are no compliance problems.  Risk factors for coronary artery disease include dyslipidemia, hypertension and obesity.   Atrial Fibrillation  Presents for follow-up visit. Symptoms are negative for shortness of breath. The symptoms have been stable. Compliance problems: taking medications, no medication side effects.   Congestive Heart Failure  Presents for follow-up visit. Pertinent negatives include no shortness of breath. The symptoms have been worsening. Compliance with total regimen is 26-50%. Compliance with diet is 26-50%. Compliance with exercise is 26-50%. Compliance with medications is 26-50%.   Thyroid Problem  Presents for follow-up (hypothyroidism taking medications, no medication side effects) visit. Patient reports no

## 2025-06-11 NOTE — PROGRESS NOTES
MG capsule, Take 1 capsule by mouth 2 times daily, Disp: , Rfl:     desvenlafaxine succinate (PRISTIQ) 25 MG TB24 extended release tablet, Take 4 tablets by mouth daily, Disp: , Rfl:     cloZAPine (CLOZARIL) 25 MG tablet, Take 1 tablet by mouth daily, Disp: , Rfl:     ferrous sulfate (IRON 325) 325 (65 Fe) MG tablet, Take 1 tablet by mouth daily (with breakfast), Disp: , Rfl:     mycophenolate (CELLCEPT) 500 MG tablet, Take 2 tablets by mouth 2 times daily, Disp: , Rfl:      Trial of linezolid 600 mg po q12 for a week   Cont bactrim    Pushpa Wright MD  2:52 PM

## 2025-06-13 DIAGNOSIS — Z94.0 IMMUNOSUPPRESSIVE MANAGEMENT ENCOUNTER FOLLOWING KIDNEY TRANSPLANT: ICD-10-CM

## 2025-06-13 DIAGNOSIS — Z94.0 KIDNEY REPLACED BY TRANSPLANT (HHS-HCC): ICD-10-CM

## 2025-06-13 DIAGNOSIS — E21.3 HYPERPARATHYROIDISM (MULTI): ICD-10-CM

## 2025-06-13 DIAGNOSIS — E55.9 VITAMIN D DEFICIENCY: ICD-10-CM

## 2025-06-13 DIAGNOSIS — Z79.899 IMMUNOSUPPRESSIVE MANAGEMENT ENCOUNTER FOLLOWING KIDNEY TRANSPLANT: ICD-10-CM
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
## 2025-06-13 NOTE — DISCHARGE INSTRUCTIONS
Visit Discharge/Physician Orders    Discharge condition: Stable    Assessment of pain at discharge: yes    Anesthetic used: 4% lidocaine    Discharge to: Home    Left via:Private automobile    Accompanied by: accompanied by cousin    [facility]    Dressing Orders:Cleanse wound to left leg with normal saline, apply ALGINATE  to wound bed and cover with ABD pad and roll gauze- adhere with tape, change daily.    Spandgrip on Bilateral legs- On in morning and off at night      Treatment Orders:   EAT DIET WITH PROTEINS AND VITAMIN C  TAKE A MULTIVITAMIN DAILY IF NOT CONTRAINDICATED      [facility] followup visit ___________1 week__________________  (Please note your next appointment above and if you are unable to keep, kindly give a 24 hour notice. Thank you.)    Physician signature:__________________________      If you experience any of the following, please call the Wound Care Center during business hours:    * Increase in Pain  * Temperature over 101  * Increase in drainage from your wound  * Drainage with a foul odor  * Bleeding  * Increase in swelling  * Need for compression bandage changes due to slippage, breakthrough drainage.    If you need medical attention outside of the business hours of the Wound Care Centers please contact your PCP or go to the nearest emergency room.
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
## 2025-06-18 LAB
MICROORGANISM SPEC CULT: NORMAL
MICROORGANISM SPEC CULT: NORMAL
MICROORGANISM/AGENT SPEC: NORMAL
MICROORGANISM/AGENT SPEC: NORMAL
SERVICE CMNT-IMP: NORMAL
SERVICE CMNT-IMP: NORMAL
SPECIMEN DESCRIPTION: NORMAL
SPECIMEN DESCRIPTION: NORMAL

## 2025-06-19 ENCOUNTER — HOSPITAL ENCOUNTER (OUTPATIENT)
Age: 70
Discharge: HOME | End: 2025-06-19
Payer: MEDICARE

## 2025-06-19 ENCOUNTER — HOSPITAL ENCOUNTER (OUTPATIENT)
Dept: WOUND CARE | Age: 70
Discharge: HOME | End: 2025-06-19
Payer: MEDICARE

## 2025-06-19 VITALS
RESPIRATION RATE: 18 BRPM | BODY MASS INDEX: 38.64 KG/M2 | SYSTOLIC BLOOD PRESSURE: 162 MMHG | HEIGHT: 62 IN | TEMPERATURE: 98.2 F | HEART RATE: 77 BPM | DIASTOLIC BLOOD PRESSURE: 77 MMHG | WEIGHT: 210 LBS

## 2025-06-19 DIAGNOSIS — Z91.81 HISTORY OF RECENT FALL: ICD-10-CM

## 2025-06-19 DIAGNOSIS — M79.89 SWELLING OF LEFT FOOT: Primary | ICD-10-CM

## 2025-06-19 LAB
BACTERIA URNS QL MICRO: ABNORMAL
BILIRUB UR QL STRIP: NEGATIVE
CLARITY UR: ABNORMAL
COLOR UR: YELLOW
GLUCOSE UR STRIP-MCNC: 500 MG/DL
HGB UR QL STRIP.AUTO: ABNORMAL
KETONES UR STRIP-MCNC: NEGATIVE MG/DL
LEUKOCYTE ESTERASE UR QL STRIP: ABNORMAL
NITRITE UR QL STRIP: POSITIVE
PH UR STRIP: 6 [PH] (ref 5–8)
PROT UR STRIP-MCNC: ABNORMAL MG/DL
RBC #/AREA URNS HPF: ABNORMAL /HPF
SP GR UR STRIP: 1.01 (ref 1–1.03)
UROBILINOGEN UR STRIP-ACNC: 0.2 EU/DL (ref 0–1)
WBC #/AREA URNS HPF: ABNORMAL /HPF

## 2025-06-19 PROCEDURE — 81001 URINALYSIS AUTO W/SCOPE: CPT

## 2025-06-19 PROCEDURE — 87086 URINE CULTURE/COLONY COUNT: CPT

## 2025-06-19 PROCEDURE — 11045 DBRDMT SUBQ TISS EACH ADDL: CPT

## 2025-06-19 PROCEDURE — 87088 URINE BACTERIA CULTURE: CPT

## 2025-06-19 PROCEDURE — 99213 OFFICE O/P EST LOW 20 MIN: CPT

## 2025-06-19 PROCEDURE — 11042 DBRDMT SUBQ TIS 1ST 20SQCM/<: CPT

## 2025-06-19 NOTE — PLAN OF CARE
Problem: Cognitive:  Goal: Knowledge of wound care  Description: Knowledge of wound care  Outcome: Progressing  Goal: Understands risk factors for wounds  Description: Understands risk factors for wounds  Outcome: Progressing     Problem: Wound:  Goal: Will show signs of wound healing; wound closure and no evidence of infection  Description: Will show signs of wound healing; wound closure and no evidence of infection  Outcome: Progressing     Problem: Compression therapy:  Goal: Will be free from complications associated with compression therapy  Description: Will be free from complications associated with compression therapy  Outcome: Progressing     Problem: Falls - Risk of:  Goal: Will remain free from falls  Description: Will remain free from falls  Outcome: Progressing

## 2025-06-21 LAB
MICROORGANISM SPEC CULT: ABNORMAL
SPECIMEN DESCRIPTION: ABNORMAL

## 2025-06-23 LAB
25(OH)D3 SERPL-MCNC: 34 NG/ML (ref 30–100)
ALBUMIN: 3.6 G/DL (ref 3.5–5.2)
ANION GAP SERPL CALCULATED.3IONS-SCNC: 17 MMOL/L (ref 7–16)
BASOPHILS # BLD: 0.03 K/UL (ref 0–0.2)
BASOPHILS NFR BLD: 0 % (ref 0–2)
BUN SERPL-MCNC: 21 MG/DL (ref 8–23)
CALCIUM SERPL-MCNC: 9.9 MG/DL (ref 8.8–10.2)
CHLORIDE SERPL-SCNC: 103 MMOL/L (ref 98–107)
CO2 SERPL-SCNC: 19 MMOL/L (ref 22–29)
CREAT SERPL-MCNC: 1.3 MG/DL (ref 0.5–1)
EOSINOPHIL # BLD: 0.05 K/UL (ref 0.05–0.5)
EOSINOPHILS RELATIVE PERCENT: 1 % (ref 0–6)
ERYTHROCYTE [DISTWIDTH] IN BLOOD BY AUTOMATED COUNT: 15.7 % (ref 11.5–15)
GFR, ESTIMATED: 47 ML/MIN/1.73M2
GLUCOSE SERPL-MCNC: 128 MG/DL (ref 74–99)
HCT VFR BLD AUTO: 36.2 % (ref 34–48)
HGB BLD-MCNC: 10.5 G/DL (ref 11.5–15.5)
IMM GRANULOCYTES # BLD AUTO: 0.06 K/UL (ref 0–0.58)
IMM GRANULOCYTES NFR BLD: 1 % (ref 0–5)
LYMPHOCYTES NFR BLD: 0.51 K/UL (ref 1.5–4)
LYMPHOCYTES RELATIVE PERCENT: 5 % (ref 20–42)
MAGNESIUM SERPL-MCNC: 1.6 MG/DL (ref 1.6–2.4)
MCH RBC QN AUTO: 30.6 PG (ref 26–35)
MCHC RBC AUTO-ENTMCNC: 29 G/DL (ref 32–34.5)
MCV RBC AUTO: 105.5 FL (ref 80–99.9)
MONOCYTES NFR BLD: 0.4 K/UL (ref 0.1–0.95)
MONOCYTES NFR BLD: 4 % (ref 2–12)
NEUTROPHILS NFR BLD: 90 % (ref 43–80)
NEUTS SEG NFR BLD: 9.02 K/UL (ref 1.8–7.3)
PHOSPHATE SERPL-MCNC: 2.8 MG/DL (ref 2.5–4.5)
PLATELET, FLUORESCENCE: 146 K/UL (ref 130–450)
PMV BLD AUTO: 14.1 FL (ref 7–12)
POTASSIUM SERPL-SCNC: 4.6 MMOL/L (ref 3.5–5.1)
PTH-INTACT SERPL-MCNC: 101 PG/ML (ref 15–65)
RBC # BLD AUTO: 3.43 M/UL (ref 3.5–5.5)
RBC # BLD: ABNORMAL 10*6/UL
SODIUM SERPL-SCNC: 139 MMOL/L (ref 136–145)
WBC OTHER # BLD: 10.1 K/UL (ref 4.5–11.5)

## 2025-06-23 NOTE — DISCHARGE INSTRUCTIONS
Visit Discharge/Physician Orders     Discharge condition: Stable     Assessment of pain at discharge: yes     Anesthetic used: 4% lidocaine     Discharge to: Home     Left via:Private automobile     Accompanied by: accompanied by cousin     [facility]     Dressing Orders:Cleanse wound to left leg with normal saline, apply ALGINATE  to wound bed and cover with ABD pad and roll gauze- adhere with tape, change daily.     Spandgrip on Bilateral legs- On in morning and off at night       Treatment Orders:   EAT DIET WITH PROTEINS AND VITAMIN C  TAKE A MULTIVITAMIN DAILY IF NOT CONTRAINDICATED       [facility] followup visit ___________3 weeks __________________  (Please note your next appointment above and if you are unable to keep, kindly give a 24 hour notice. Thank you.)     Physician signature:__________________________        If you experience any of the following, please call the Wound Care Center during business hours:     * Increase in Pain  * Temperature over 101  * Increase in drainage from your wound  * Drainage with a foul odor  * Bleeding  * Increase in swelling  * Need for compression bandage changes due to slippage, breakthrough drainage.     If you need medical attention outside of the business hours of the Wound Care Centers please contact your PCP or go to the nearest emergency room.

## 2025-06-25 PROBLEM — Z86.79 HISTORY OF ATRIAL FIBRILLATION: Status: ACTIVE | Noted: 2025-06-25

## 2025-06-25 PROBLEM — L97.922: Status: ACTIVE | Noted: 2025-06-25

## 2025-06-25 PROBLEM — T14.8XXA ESCHAR OF WOUND BED: Status: ACTIVE | Noted: 2025-06-25

## 2025-06-25 LAB
CYCLOSPORINE BLD-MCNC: 55 UG/L (ref 50–300)
MICROORGANISM SPEC CULT: NORMAL
MICROORGANISM/AGENT SPEC: NORMAL
SERVICE CMNT-IMP: NORMAL
SPECIMEN DESCRIPTION: NORMAL

## 2025-06-26 ENCOUNTER — HOSPITAL ENCOUNTER (OUTPATIENT)
Dept: WOUND CARE | Age: 70
Discharge: HOME | End: 2025-06-26
Payer: MEDICARE

## 2025-06-26 ENCOUNTER — OFFICE VISIT (OUTPATIENT)
Dept: INTERNAL MEDICINE CLINIC | Age: 70
End: 2025-06-26

## 2025-06-26 VITALS
OXYGEN SATURATION: 95 % | WEIGHT: 210 LBS | DIASTOLIC BLOOD PRESSURE: 80 MMHG | TEMPERATURE: 98 F | SYSTOLIC BLOOD PRESSURE: 118 MMHG | HEART RATE: 105 BPM | HEIGHT: 62 IN | BODY MASS INDEX: 38.64 KG/M2

## 2025-06-26 VITALS
DIASTOLIC BLOOD PRESSURE: 61 MMHG | HEIGHT: 62 IN | BODY MASS INDEX: 38.64 KG/M2 | HEART RATE: 84 BPM | WEIGHT: 210 LBS | SYSTOLIC BLOOD PRESSURE: 137 MMHG | RESPIRATION RATE: 18 BRPM | TEMPERATURE: 98 F

## 2025-06-26 DIAGNOSIS — Z94.0 KIDNEY TRANSPLANTED: ICD-10-CM

## 2025-06-26 DIAGNOSIS — J44.1 CHRONIC OBSTRUCTIVE PULMONARY DISEASE WITH (ACUTE) EXACERBATION (HCC): ICD-10-CM

## 2025-06-26 DIAGNOSIS — I89.0 LYMPHEDEMA OF BOTH LOWER EXTREMITIES: Primary | ICD-10-CM

## 2025-06-26 DIAGNOSIS — E78.2 MIXED HYPERLIPIDEMIA: ICD-10-CM

## 2025-06-26 DIAGNOSIS — I50.22 CHRONIC SYSTOLIC (CONGESTIVE) HEART FAILURE (HCC): ICD-10-CM

## 2025-06-26 DIAGNOSIS — F31.9 BIPOLAR 1 DISORDER (HCC): ICD-10-CM

## 2025-06-26 DIAGNOSIS — N18.32 STAGE 3B CHRONIC KIDNEY DISEASE (HCC): ICD-10-CM

## 2025-06-26 DIAGNOSIS — I10 PRIMARY HYPERTENSION: ICD-10-CM

## 2025-06-26 DIAGNOSIS — Z09 HOSPITAL DISCHARGE FOLLOW-UP: Primary | ICD-10-CM

## 2025-06-26 DIAGNOSIS — L97.922: ICD-10-CM

## 2025-06-26 DIAGNOSIS — R73.9 HYPERGLYCEMIA: ICD-10-CM

## 2025-06-26 DIAGNOSIS — E55.9 VITAMIN D DEFICIENCY: ICD-10-CM

## 2025-06-26 LAB — HBA1C MFR BLD: 5.4 %

## 2025-06-26 PROCEDURE — 11042 DBRDMT SUBQ TIS 1ST 20SQCM/<: CPT | Performed by: NURSE PRACTITIONER

## 2025-06-26 PROCEDURE — 11042 DBRDMT SUBQ TIS 1ST 20SQCM/<: CPT

## 2025-06-26 PROCEDURE — 11045 DBRDMT SUBQ TISS EACH ADDL: CPT | Performed by: NURSE PRACTITIONER

## 2025-06-26 PROCEDURE — 11045 DBRDMT SUBQ TISS EACH ADDL: CPT

## 2025-06-26 RX ORDER — LIDOCAINE HYDROCHLORIDE 40 MG/ML
SOLUTION TOPICAL ONCE
Status: COMPLETED | OUTPATIENT
Start: 2025-06-26 | End: 2025-06-26

## 2025-06-26 RX ORDER — DESVENLAFAXINE 100 MG/1
100 TABLET, EXTENDED RELEASE ORAL NIGHTLY
COMMUNITY
Start: 2025-06-17

## 2025-06-26 RX ADMIN — LIDOCAINE HYDROCHLORIDE 10 ML: 40 SOLUTION TOPICAL at 10:46

## 2025-06-26 NOTE — PROGRESS NOTES
Medication Sig Dispense Refill    cefdinir (OMNICEF) 300 MG capsule Take 1 capsule by mouth daily 30 capsule 0    folic acid (FOLVITE) 1 MG tablet Take 1 tablet by mouth daily 90 tablet 1    empagliflozin (JARDIANCE) 10 MG tablet Take 1 tablet by mouth daily 30 tablet 5    levothyroxine (SYNTHROID) 50 MCG tablet Take 1 tablet by mouth daily 90 tablet 1    metoprolol tartrate (LOPRESSOR) 25 MG tablet 0.5mg bid 90 tablet 1    bumetanide (BUMEX) 1 MG tablet Take 1 tablet by mouth daily 90 tablet 3    predniSONE (DELTASONE) 5 MG tablet Take 1 tablet by mouth daily 90 tablet 1    apixaban (ELIQUIS) 5 MG TABS tablet Take 1 tablet by mouth 2 times daily 180 tablet 3    NONFORMULARY PROBIOTIC ACIDophilus      NONFORMULARY daily D-Mannose 1300mg      simvastatin (ZOCOR) 40 MG tablet Take 1 tablet by mouth nightly 90 tablet 2    acetaminophen (TYLENOL) 325 MG tablet Take 2 tablets by mouth every 6 hours as needed for Pain      cycloSPORINE (SANDIMMUNE) 100 MG capsule Take 1 capsule by mouth 2 times daily      cloZAPine (CLOZARIL) 25 MG tablet Take 1 tablet by mouth daily      ferrous sulfate (IRON 325) 325 (65 Fe) MG tablet Take 1 tablet by mouth daily (with breakfast)      mycophenolate (CELLCEPT) 500 MG tablet Take 2 tablets by mouth 2 times daily      desvenlafaxine succinate (PRISTIQ) 100 MG TB24 extended release tablet Take 1 tablet by mouth nightly at bedtime.       No current facility-administered medications on file prior to encounter.       REVIEW OF SYSTEMS   ROS : All others Negative if blank [], Positive if [x]  General Vascular   [] Fevers [] Claudication   [] Chills [] Rest Pain   Skin Neurologic   [] Tissue Loss [] Lower extremity neuropathy     Objective:    /61   Pulse 84   Temp 98 °F (36.7 °C) (Temporal)   Resp 18   Ht 1.575 m (5' 2\")   Wt 95.3 kg (210 lb)   BMI 38.41 kg/m²   Wt Readings from Last 3 Encounters:   06/26/25 95.3 kg (210 lb)   06/26/25 95.3 kg (210 lb)   06/19/25 95.3 kg (210 lb)

## 2025-06-26 NOTE — PROGRESS NOTES
tablet  Commonly known as: PRISTIQ     * desvenlafaxine succinate 100 MG Tb24 extended release tablet  Commonly known as: PRISTIQ     empagliflozin 10 MG tablet  Commonly known as: Jardiance  Take 1 tablet by mouth daily     ferrous sulfate 325 (65 Fe) MG tablet  Commonly known as: IRON 325     folic acid 1 MG tablet  Commonly known as: FOLVITE  Take 1 tablet by mouth daily     levothyroxine 50 MCG tablet  Commonly known as: SYNTHROID  Take 1 tablet by mouth daily     metoprolol tartrate 25 MG tablet  Commonly known as: LOPRESSOR  0.5mg bid     mycophenolate 500 MG tablet  Commonly known as: CELLCEPT     NONFORMULARY     NONFORMULARY     predniSONE 5 MG tablet  Commonly known as: DELTASONE  Take 1 tablet by mouth daily     simvastatin 40 MG tablet  Commonly known as: ZOCOR  Take 1 tablet by mouth nightly           * This list has 2 medication(s) that are the same as other medications prescribed for you. Read the directions carefully, and ask your doctor or other care provider to review them with you.                    Medications marked \"taking\" at this time  Outpatient Medications Marked as Taking for the 6/26/25 encounter (Office Visit) with Brennon Malave, DO   Medication Sig Dispense Refill    desvenlafaxine succinate (PRISTIQ) 100 MG TB24 extended release tablet Take 1 tablet by mouth nightly at bedtime.      cefdinir (OMNICEF) 300 MG capsule Take 1 capsule by mouth daily 30 capsule 0    folic acid (FOLVITE) 1 MG tablet Take 1 tablet by mouth daily 90 tablet 1    empagliflozin (JARDIANCE) 10 MG tablet Take 1 tablet by mouth daily 30 tablet 5    levothyroxine (SYNTHROID) 50 MCG tablet Take 1 tablet by mouth daily 90 tablet 1    metoprolol tartrate (LOPRESSOR) 25 MG tablet 0.5mg bid 90 tablet 1    bumetanide (BUMEX) 1 MG tablet Take 1 tablet by mouth daily 90 tablet 3    predniSONE (DELTASONE) 5 MG tablet Take 1 tablet by mouth daily 90 tablet 1    apixaban (ELIQUIS) 5 MG TABS tablet Take 1 tablet by mouth

## 2025-06-27 RX ORDER — BETAMETHASONE DIPROPIONATE 0.5 MG/G
CREAM TOPICAL PRN
OUTPATIENT
Start: 2025-06-27

## 2025-06-27 RX ORDER — LIDOCAINE 50 MG/G
OINTMENT TOPICAL PRN
OUTPATIENT
Start: 2025-06-27

## 2025-06-27 RX ORDER — LIDOCAINE HYDROCHLORIDE 20 MG/ML
JELLY TOPICAL PRN
OUTPATIENT
Start: 2025-06-27

## 2025-06-27 RX ORDER — TRIAMCINOLONE ACETONIDE 1 MG/G
OINTMENT TOPICAL PRN
OUTPATIENT
Start: 2025-06-27

## 2025-06-27 RX ORDER — SILVER SULFADIAZINE 10 MG/G
CREAM TOPICAL PRN
OUTPATIENT
Start: 2025-06-27

## 2025-06-27 RX ORDER — MUPIROCIN 2 %
OINTMENT (GRAM) TOPICAL PRN
OUTPATIENT
Start: 2025-06-27

## 2025-06-27 RX ORDER — GINSENG 100 MG
CAPSULE ORAL PRN
OUTPATIENT
Start: 2025-06-27

## 2025-06-27 RX ORDER — LIDOCAINE HYDROCHLORIDE 40 MG/ML
SOLUTION TOPICAL PRN
OUTPATIENT
Start: 2025-06-27

## 2025-06-27 RX ORDER — SODIUM CHLOR/HYPOCHLOROUS ACID 0.033 %
SOLUTION, IRRIGATION IRRIGATION PRN
OUTPATIENT
Start: 2025-06-27

## 2025-06-27 RX ORDER — CLOBETASOL PROPIONATE 0.5 MG/G
OINTMENT TOPICAL PRN
OUTPATIENT
Start: 2025-06-27

## 2025-06-27 RX ORDER — BACITRACIN ZINC AND POLYMYXIN B SULFATE 500; 1000 [USP'U]/G; [USP'U]/G
OINTMENT TOPICAL PRN
OUTPATIENT
Start: 2025-06-27

## 2025-06-27 RX ORDER — GENTAMICIN SULFATE 1 MG/G
OINTMENT TOPICAL PRN
OUTPATIENT
Start: 2025-06-27

## 2025-06-27 RX ORDER — LIDOCAINE 40 MG/G
CREAM TOPICAL PRN
OUTPATIENT
Start: 2025-06-27

## 2025-06-27 RX ORDER — NEOMYCIN/BACITRACIN/POLYMYXINB 3.5-400-5K
OINTMENT (GRAM) TOPICAL PRN
OUTPATIENT
Start: 2025-06-27

## 2025-07-09 DIAGNOSIS — A04.71 RECURRENT CLOSTRIDIOIDES DIFFICILE DIARRHEA: Primary | ICD-10-CM

## 2025-07-09 RX ORDER — VANCOMYCIN HYDROCHLORIDE 125 MG/1
CAPSULE ORAL
Qty: 120 CAPSULE | Refills: 0 | Status: SHIPPED | OUTPATIENT
Start: 2025-07-09 | End: 2025-09-07

## 2025-07-09 NOTE — PROGRESS NOTES
Results       Procedure Component Value Units Date/Time    Clostridium Difficile Toxin/Antigen [[id number]]  (Abnormal) Collected: 07/07/25 0940    Order Status: Completed Specimen: Stool Updated: 07/08/25 1157     Specimen Description .FECES     C DIFF AG + TOXIN POSITIVE     Comment: C.difficile antigen and Toxin Detected.             Orders Placed This Encounter    vancomycin (VANCOCIN) 125 MG capsule     Sig: Take 2 capsules by mouth 4 times daily for 10 days, THEN 1 capsule 3 times daily for 10 days, THEN 1 capsule every 12 hours for 10 days, THEN 1 capsule Daily for 10 days, THEN 1 capsule every 48 hours for 10 days, THEN 1 capsule three times a week for 10 days.     Dispense:  120 capsule     Refill:  0      Called pt informed above    Future Appointments         Provider Specialty Dept Phone    7/16/2025 10:30 AM [facility] ROOM 2 Cardiology [phone]    7/17/2025 10:00 AM Lia Golden, APRN - CNP Wound Ostomy [phone]    7/23/2025 10:00 AM Pushpa Wright MD Infectious Diseases [phone]    8/14/2025 12:30 PM Brennon Malave, DO Internal Medicine [phone]    8/25/2025 10:30 AM Silvestre Nash MD Cardiology [phone]    12/23/2025 10:00 AM Brennon Malave DO Internal Medicine [phone]         Pushpa Wright MD  9:34 AM

## 2025-07-11 DIAGNOSIS — Z94.0 IMMUNOSUPPRESSIVE MANAGEMENT ENCOUNTER FOLLOWING KIDNEY TRANSPLANT: ICD-10-CM

## 2025-07-11 DIAGNOSIS — Z79.899 IMMUNOSUPPRESSIVE MANAGEMENT ENCOUNTER FOLLOWING KIDNEY TRANSPLANT: ICD-10-CM

## 2025-07-11 DIAGNOSIS — E55.9 VITAMIN D DEFICIENCY: ICD-10-CM

## 2025-07-11 DIAGNOSIS — Z94.0 KIDNEY REPLACED BY TRANSPLANT (HHS-HCC): ICD-10-CM

## 2025-07-11 DIAGNOSIS — E21.3 HYPERPARATHYROIDISM (MULTI): ICD-10-CM
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
## 2025-07-14 NOTE — DISCHARGE INSTRUCTIONS
Visit Discharge/Physician Orders     Discharge condition: Stable     Assessment of pain at discharge: yes     Anesthetic used: 4% lidocaine     Discharge to: Home     Left via:Private automobile     Accompanied by: accompanied by cousin     [facility]     Dressing Orders:Cleanse wound to left leg with normal saline, apply ALGINATE  to wound bed and cover with ABD pad and roll gauze- adhere with tape, change daily.     Spandgrip on Bilateral legs- On in morning and off at night       Treatment Orders:   EAT DIET WITH PROTEINS AND VITAMIN C  TAKE A MULTIVITAMIN DAILY IF NOT CONTRAINDICATED       [facility] followup visit ___________3 weeks __________________  (Please note your next appointment above and if you are unable to keep, kindly give a 24 hour notice. Thank you.)     Physician signature:__________________________        If you experience any of the following, please call the Wound Care Center during business hours:     * Increase in Pain  * Temperature over 101  * Increase in drainage from your wound  * Drainage with a foul odor  * Bleeding  * Increase in swelling  * Need for compression bandage changes due to slippage, breakthrough drainage.     If you need medical attention outside of the business hours of the Wound Care Centers please contact your PCP or go to the nearest emergency room.

## 2025-07-14 NOTE — TELEPHONE ENCOUNTER
Dr Cheema, can you place new Service to Ortho for this diagnosis. Please refer to Dr Tate Doe. He will see patient for Lumbar pain. Thank you   Returned patients call.  Pt has been taking med as prescribed.  Holding morning dose prior to getting labs drawn.  Pt was in rehab and taking liquid cyclo and pill version.  Plan is still to repeat lvls this week.

## 2025-07-16 ENCOUNTER — TELEPHONE (OUTPATIENT)
Dept: OTHER | Age: 70
End: 2025-07-16

## 2025-07-16 ENCOUNTER — OFFICE VISIT (OUTPATIENT)
Dept: TRANSPLANT | Facility: CLINIC | Age: 70
End: 2025-07-16
Payer: MEDICARE

## 2025-07-16 ENCOUNTER — HOSPITAL ENCOUNTER (OUTPATIENT)
Dept: OTHER | Age: 70
Setting detail: THERAPIES SERIES
End: 2025-07-16
Payer: MEDICARE

## 2025-07-16 VITALS
BODY MASS INDEX: 36.58 KG/M2 | HEART RATE: 91 BPM | TEMPERATURE: 97.5 F | WEIGHT: 200 LBS | OXYGEN SATURATION: 97 % | SYSTOLIC BLOOD PRESSURE: 131 MMHG | DIASTOLIC BLOOD PRESSURE: 81 MMHG

## 2025-07-16 DIAGNOSIS — I15.1 HYPERTENSION SECONDARY TO OTHER RENAL DISORDERS: ICD-10-CM

## 2025-07-16 DIAGNOSIS — Z48.298 AFTERCARE FOLLOWING ORGAN TRANSPLANT: Primary | ICD-10-CM
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
## 2025-07-16 DIAGNOSIS — E55.9 VITAMIN D DEFICIENCY: ICD-10-CM

## 2025-07-16 DIAGNOSIS — E21.3 HYPERPARATHYROIDISM (MULTI): ICD-10-CM

## 2025-07-16 DIAGNOSIS — Z94.0 IMMUNOSUPPRESSIVE MANAGEMENT ENCOUNTER FOLLOWING KIDNEY TRANSPLANT: ICD-10-CM

## 2025-07-16 DIAGNOSIS — Z94.0 KIDNEY REPLACED BY TRANSPLANT (HHS-HCC): ICD-10-CM

## 2025-07-16 DIAGNOSIS — Z79.899 IMMUNOSUPPRESSIVE MANAGEMENT ENCOUNTER FOLLOWING KIDNEY TRANSPLANT: ICD-10-CM

## 2025-07-16 PROCEDURE — G2211 COMPLEX E/M VISIT ADD ON: HCPCS | Performed by: INTERNAL MEDICINE

## 2025-07-16 PROCEDURE — 3075F SYST BP GE 130 - 139MM HG: CPT | Performed by: INTERNAL MEDICINE

## 2025-07-16 PROCEDURE — 3079F DIAST BP 80-89 MM HG: CPT | Performed by: INTERNAL MEDICINE

## 2025-07-16 PROCEDURE — 99215 OFFICE O/P EST HI 40 MIN: CPT | Performed by: INTERNAL MEDICINE

## 2025-07-16 PROCEDURE — 1126F AMNT PAIN NOTED NONE PRSNT: CPT | Performed by: INTERNAL MEDICINE

## 2025-07-16 PROCEDURE — 99215 OFFICE O/P EST HI 40 MIN: CPT | Mod: AF | Performed by: INTERNAL MEDICINE

## 2025-07-16 ASSESSMENT — PAIN SCALES - GENERAL: PAINLEVEL_OUTOF10: 0-NO PAIN

## 2025-07-16 NOTE — TELEPHONE ENCOUNTER
Rescheduled patient's [facility]F Clinic appointment from 7/16/25 d/t recent diagnosis of c-diff on 7/9/25. Appt. Rescheduled for 7/29/25 at 1030.

## 2025-07-16 NOTE — PROGRESS NOTES
TRANSPLANT NEPHROLOGY :   OUTPATIENT CLINIC NOTE      SERVICE DATE : 07/16/2025     REASON FOR VISIT/CHIEF COMPLAINT:  S/P  TRANSPLANT SURGERY  IMMUNOSUPPRESSIVE MEDICATION MANAGEMENT  BLOOD PRESSURE MANAGEMENT    HPI:    Ms. Jarad Paulino is a 70 y.o. 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      Admitted 6/1/24 at OSH with emphysematous pyelonephritis of the transplanted kidney and tiny right transplant non obstructing renal stone along with acute pancreatitis. S/p abx course and acute rehab.     Last seen in txp clinic 2/2025. Here for follow up after recent admission to [facility].    Admission 3/2025 with falls, LAURA in the setting of volume depletion. Clinically improved with hydration. Pt was sent to SNF for rehab.     Seen by urology for solid mass in upper pole of rt native kidney noted on MRI lumbar spine 2/2025. Rpt renal US 3/2025 did not note any mass. Urology plans for rpt renal US in 6 months.    Another admission 5/2025 with large hematoma in Lt lower extremity after a mechanical fall requiring evacuation by surgery and wound vac application 5/19/25. Hospital course complicated by volume overload treated with Bumex. Discharged to subacute rehab and returned home 2 weeks later. Follows with ortho.     Follows with ID for h/o recurrent UTI. H/o ESBL E coli on Ucx. Currently on PO vanc for C diff colitis. Diarrhea improved.     H/o LAURA during above admission, peak Cr 1.8, down to 1.3 on last labs 6/23/25.    Today, pt reports her overall condition is gradually improving.     Remains on CsA 100 mg bid, MMF 500mg bid, pred 5 mg/d.     LLE wound improving.     ROS:  Review of  14 systems was performed system by  system. See HPI. Otherwise, the symptoms were negative.    PAST MEDICAL HISTORY:  Medical History[1]     PAST SURGICAL HISTORY:  Surgical History[2]     SOCIAL HISTORY:  Social History     Socioeconomic History    Marital status:      Spouse name: Not on file    Number of children: Not on file    Years of education: Not on file    Highest education level: Not on file   Occupational History    Not on file   Tobacco Use    Smoking status: Unknown    Smokeless tobacco: Not on file   Vaping Use    Vaping status: Every Day   Substance and Sexual Activity    Alcohol use: Not on file    Drug use: Not on file    Sexual activity: Not on file   Other Topics Concern    Not on file   Social History Narrative    Not on file     Social Drivers of Health     Financial Resource Strain: Low Risk  (11/12/2024)    Received from [facility]    Overall Financial Resource Strain (CARDIA)     Difficulty of Paying Living Expenses: Not hard at all   Food Insecurity: No Food Insecurity (5/18/2025)    Received from [facility]    Hunger Vital Sign     Within the past 12 months, you worried that your food would run out before you got the money to buy more.: Never true     Within the past 12 months, the food you bought just didn't last and you didn't have money to get more.: Never true   Transportation Needs: No Transportation Needs (5/18/2025)    Received from [facility]    PRAPARE - Transportation     Lack of Transportation (Medical): No     Lack of Transportation (Non-Medical): No   Physical Activity: Inactive (12/17/2024)    Received from [facility]    Exercise Vital Sign     On average, how many days per week do you engage in moderate to strenuous exercise (like a brisk walk)?: 0 days     On average, how many minutes do you engage in exercise at this level?: 0 min   Stress: No Stress Concern Present (10/25/2023)    [facility] from [email or web address]    [facility] - Occupational Stress Questionnaire     Feeling of Stress : Only a little   Social Connections: Moderately Isolated (10/25/2023)    [facility]    Social Connection and Isolation Panel     In a typical week, how many times do you talk on the phone with family, friends, or neighbors?: Three times a week     How often do you get together with friends or relatives?: Twice a week     How often do you attend Orthodoxy or Latter day services?: 1 to 4 times per year     Do you belong to any clubs or organizations such as Orthodoxy groups, unions, fraternal or athletic groups, or school groups?: No     How often do you attend meetings of the clubs or organizations you belong to?: Never     Are you , , , , never , or living with a partner?:    Intimate Partner Violence: Not on file   Housing Stability: Low Risk  (5/18/2025)    [facility]    Housing Stability Vital Sign     In the last 12 months, was there a time when you were not able to pay the mortgage or rent on time?: No     In the past 12 months, how many times have you moved where you were living?: 0     At any time in the past 12 months, were you homeless or living in a shelter (including now)?: No       FAMILY HISTORY:  Family History[3]    MEDICATION LIST:  Current Outpatient Medications   Medication Instructions    acetaminophen (Tylenol) 325 mg tablet 2 tablets, oral, Every 6 hours PRN    apixaban (ELIQUIS) 5 mg, oral, 2 times daily    carvedilol (COREG) 6.25 mg, oral, 2 times daily    cloZAPine (CLOZARIL) 25 mg, oral, Daily    cycloSPORINE modified (NEORAL) 75 mg, oral, 2 times daily    desvenlafaxine (PRISTIQ) 100 mg, oral, Daily    ferrous sulfate 325 mg, oral, Daily with breakfast    folic acid (FOLVITE) 1 mg, oral, Daily    lactulose 20 g, oral, 2 times daily PRN    levothyroxine  (SYNTHROID, LEVOXYL) 50 mcg, oral, Every morning    mycophenolate (CELLCEPT) 750 mg, oral, 2 times daily    oxyCODONE-acetaminophen (Percocet) 5-325 mg tablet 1 tablet, oral, 4 times daily PRN    predniSONE (DELTASONE) 5 mg, oral, Daily    simvastatin (ZOCOR) 40 mg, oral, Nightly    sodium chloride (Ocean) 0.65 % nasal spray 1 spray, Each Nostril, 4 times daily PRN       ALLERGY  Allergies[4]    PHYSICAL EXAM:    Visit Vitals  /81   Pulse 91   Temp 36.4 °C (97.5 °F) (Temporal)   Wt 90.7 kg (200 lb)   SpO2 97%   BMI 36.58 kg/m²   Smoking Status Unknown   BSA 1.99 m²          General Appearance - NAD, A&Ox3   HEENT - Supple. Not pale. No jaundice. No JVD or LAD  CVS - RRR. Normal S1/S2. No murmur, rub or gallop  Lungs- clear to auscultation bilaterally  Abdomen - soft , NT, ND, no guarding. No hepatosplenomegaly. No allograft tenderness  Musculoskeletal /Extremities - b/l LE trace edema, LLE in dressing.   Neuro/Psych - appropriate mood and affect. Motor power V/V all extremities. CN I -XII were grossly intact.  Skin - No visible rash      LABS:    Lab Results   Component Value Date    CREATININE 1.80 01/16/2025    BUN 37 01/16/2025     01/16/2025    K 4.9 01/16/2025     01/16/2025    CO2 25 01/16/2025     Lab Results   Component Value Date    .2 (H) 06/06/2024    CALCIUM 10.4 01/16/2025    CAION 1.40 (H) 06/15/2024    PHOS 3.0 06/15/2024     Lab Results   Component Value Date    WBC 10.4 06/15/2024    HGB 11.1 (L) 06/15/2024    HCT 34.7 (L) 06/15/2024     (H) 06/15/2024     06/15/2024     Lab Results   Component Value Date    IRON 69 03/30/2020    TIBC 334 03/30/2020    FERRITIN 525 (H) 03/30/2020     Lab Results   Component Value Date    TACROLIMUS <2.0 02/05/2018    CMVPCRIU NOT DETECTED 01/25/2022    BKVIRPCRQN 28 (H) 06/06/2024    EBVDNAPCR <35 Detected (A) 06/06/2024     Lab Results   Component Value Date    CMVDNAPCR Not Detected 06/06/2024    BKVDNAPCR Detected (A)  06/06/2024    EBVDNAPCR <35 Detected (A) 06/06/2024         ASSESSMENT AND PLAN:    Ms. Jarad Paulino is a 70 y.o. female  who is here for follow up s/p kidney transplant.    TRANSPLANT DATE: 10/30/2015 (Kidney)      1. ESRD S/P kidney transplant   - Creatinine last check was 1.3 6/23/25. Recent Beka sec to hemodynamic injury, improving.   - Renal allograft function is relatively stable.   -Random urine protein/creatinine ratio is 260 mg/g 4/2025. Monitor rpt labs  -Ensure adequate hydration  - Avoid nephrotoxic medications, NSAIDs, and IV contrast.    2. Immunosuppression  -CsA level last check was 57, unclear if true trough. Cont current dose 100mg bid, monitor rpt trough level and adjust dose as indicated  -Continue  mg q12, Pred 5 mg/d     3. Electrolytes  -Acceptable from last lab drawn    4. Hypertension  -recent BPs acceptable. Mild LE edema on exam. Cont compression dressings, leg elevation. Currently not on diuretics, may consider if edema persists or worsens.   -cont to monitor home BP, call if trends concerning  -Continue current anti hypertensive medication    5. Bone Mineral Disease/Osteoporosis  - Ca, phos acceptable  -check VIT D, PTH with next lab  - Consider DEXA every 2-3 years , defer to PCP    6.Anemia/WBC:  - Hb ~11, acceptable, recent h/o PRBC 5/2025.  - WBC stable    7.Health maintenance and vaccination  - Flu shot during flu season annually  - Cancer screening is up to date per the patient    Lab : Routine transplant lab ( CBC, RFP, and anti-rejection trough level ) every 3 months  Additional labs:  VIT D, PTH Q3 months  Viral screening PCR, Allosure and UPC per protocol.    Additional Plan :  - follow with wound care as scheduled.  - currently on PO Vancomycin for C diff. Follow with ID as scheduled.     RTC 3 month(s)       [1]   Past Medical History:  Diagnosis Date    Asymptomatic human immunodeficiency virus (hiv) infection status (Multi) 04/12/2016    HIV, asymptomatic    Kidney  transplant status (HHS-HCC)     Status post kidney transplant    Personal history of other infectious and parasitic diseases 04/12/2016    History of hepatitis B virus infection    Personal history of other infectious and parasitic diseases 04/12/2016    History of hepatitis C virus infection    Postprocedural hypoparathyroidism (Multi)     Status post subtotal parathyroidectomy    Postprocedural hypothyroidism     Status post partial thyroidectomy    Urinary tract infection, site not specified 04/08/2016    Acute UTI   [2]   Past Surgical History:  Procedure Laterality Date    OTHER SURGICAL HISTORY  05/05/2016    Renal Transplant   [3] No family history on file.  [4]   Allergies  Allergen Reactions    Macrodantin [Nitrofurantoin Macrocrystal] GI Upset    Sulfa (Sulfonamide Antibiotics) Rash

## 2025-07-17 ENCOUNTER — HOSPITAL ENCOUNTER (OUTPATIENT)
Dept: WOUND CARE | Age: 70
Discharge: HOME | End: 2025-07-17

## 2025-07-18 NOTE — DISCHARGE INSTRUCTIONS
Visit Discharge/Physician Orders     Discharge condition: Stable     Assessment of pain at discharge: yes     Anesthetic used: 4% lidocaine     Discharge to: Home     Left via:Private automobile     Accompanied by: accompanied by cousin     ECF/[facility]: [facility]     Dressing Orders:Cleanse wound to left leg with normal saline, apply ALGINATE  to wound bed and cover with ABD pad and roll gauze- adhere with tape, change daily.     Spandgrip on Bilateral legs- On in morning and off at night       Treatment Orders:   EAT DIET WITH PROTEINS AND VITAMIN C  TAKE A MULTIVITAMIN DAILY IF NOT CONTRAINDICATED       [facility] followup visit ___________3 weeks __________________  (Please note your next appointment above and if you are unable to keep, kindly give a 24 hour notice. Thank you.)     Physician signature:__________________________        If you experience any of the following, please call the Wound Care Center during business hours:     * Increase in Pain  * Temperature over 101  * Increase in drainage from your wound  * Drainage with a foul odor  * Bleeding  * Increase in swelling  * Need for compression bandage changes due to slippage, breakthrough drainage.     If you need medical attention outside of the business hours of the Wound Care Centers please contact your PCP or go to the nearest emergency room.

## 2025-07-22 DIAGNOSIS — Z94.0 KIDNEY TRANSPLANTED: ICD-10-CM

## 2025-07-22 RX ORDER — FOLIC ACID 1 MG/1
1 TABLET ORAL DAILY
Qty: 90 TABLET | Refills: 1 | Status: SHIPPED | OUTPATIENT
Start: 2025-07-22

## 2025-07-22 NOTE — TELEPHONE ENCOUNTER
Name of Medication(s) Requested:  Requested Prescriptions     Pending Prescriptions Disp Refills    folic acid (FOLVITE) 1 MG tablet 90 tablet 1     Sig: Take 1 tablet by mouth daily       Medication is on current medication list Yes    Dosage and directions were verified? Yes    Quantity verified: 90 day supply x 1 refill    Pharmacy Verified?  Yes    Last Appointment:  6/26/2025    Future appts:  Future Appointments   Date Time Provider Department Center   7/23/2025  8:45 AM SCHEDULE, [facility] [place] [facility]   7/23/2025 10:00 AM Pushpa Wright MD [facility]   7/24/2025  9:00 AM Lia Golden, APRN - CNP [facility] WOUND [employer]   7/29/2025  1:30 PM Psychiatric CHF ROOM 1 [facility]   8/14/2025 12:30 PM Brennon Malaev DO [facility] DEP   8/25/2025 10:30 AM Silvestre Nash MD [facility]   12/23/2025 10:00 AM Brennon Malave DO [facility] DEP        (If no appt send self scheduling link. .REFILLAPPT)  Scheduling request sent?     [] Yes  [x] No    Does patient need updated?  [] Yes  [x] No

## 2025-07-23 LAB
ALBUMIN: 3.5 G/DL (ref 3.5–5.2)
ANION GAP SERPL CALCULATED.3IONS-SCNC: 18 MMOL/L (ref 7–16)
BASOPHILS # BLD: 0 K/UL (ref 0–0.2)
BASOPHILS NFR BLD: 0 % (ref 0–2)
BUN SERPL-MCNC: 22 MG/DL (ref 8–23)
CALCIUM SERPL-MCNC: 9.6 MG/DL (ref 8.8–10.2)
CHLORIDE SERPL-SCNC: 103 MMOL/L (ref 98–107)
CO2 SERPL-SCNC: 20 MMOL/L (ref 22–29)
CREAT SERPL-MCNC: 1.4 MG/DL (ref 0.5–1)
EOSINOPHIL # BLD: 0 K/UL (ref 0.05–0.5)
EOSINOPHILS RELATIVE PERCENT: 0 % (ref 0–6)
ERYTHROCYTE [DISTWIDTH] IN BLOOD BY AUTOMATED COUNT: 15.7 % (ref 11.5–15)
GFR, ESTIMATED: 43 ML/MIN/1.73M2
GLUCOSE SERPL-MCNC: 88 MG/DL (ref 74–99)
HCT VFR BLD AUTO: 42.5 % (ref 34–48)
HGB BLD-MCNC: 12.2 G/DL (ref 11.5–15.5)
LYMPHOCYTES NFR BLD: 0.79 K/UL (ref 1.5–4)
LYMPHOCYTES RELATIVE PERCENT: 7 % (ref 20–42)
MAGNESIUM SERPL-MCNC: 1.6 MG/DL (ref 1.6–2.4)
MCH RBC QN AUTO: 29.9 PG (ref 26–35)
MCHC RBC AUTO-ENTMCNC: 28.7 G/DL (ref 32–34.5)
MCV RBC AUTO: 104.2 FL (ref 80–99.9)
MONOCYTES NFR BLD: 0.49 K/UL (ref 0.1–0.95)
MONOCYTES NFR BLD: 5 % (ref 2–12)
NEUTROPHILS NFR BLD: 88 % (ref 43–80)
NEUTS SEG NFR BLD: 9.72 K/UL (ref 1.8–7.3)
PHOSPHATE SERPL-MCNC: 2.8 MG/DL (ref 2.5–4.5)
PLATELET, FLUORESCENCE: 162 K/UL (ref 130–450)
PMV BLD AUTO: 13.3 FL (ref 7–12)
POTASSIUM SERPL-SCNC: 4.7 MMOL/L (ref 3.5–5.1)
PTH-INTACT SERPL-MCNC: 228 PG/ML (ref 15–65)
RBC # BLD AUTO: 4.08 M/UL (ref 3.5–5.5)
RBC # BLD: ABNORMAL 10*6/UL
SODIUM SERPL-SCNC: 140 MMOL/L (ref 136–145)
TOTAL PROTEIN, URINE: 24 MG/DL (ref 0–12)
WBC OTHER # BLD: 11 K/UL (ref 4.5–11.5)

## 2025-07-24 ENCOUNTER — HOSPITAL ENCOUNTER (OUTPATIENT)
Dept: WOUND CARE | Age: 70
Discharge: HOME | End: 2025-07-24
Attending: NURSE PRACTITIONER
Payer: MEDICARE

## 2025-07-24 VITALS
HEART RATE: 68 BPM | TEMPERATURE: 97.6 F | BODY MASS INDEX: 38.64 KG/M2 | HEIGHT: 62 IN | WEIGHT: 210 LBS | RESPIRATION RATE: 18 BRPM | SYSTOLIC BLOOD PRESSURE: 122 MMHG | DIASTOLIC BLOOD PRESSURE: 76 MMHG

## 2025-07-24 DIAGNOSIS — Z79.01 CHRONIC ANTICOAGULATION: ICD-10-CM

## 2025-07-24 DIAGNOSIS — L97.922: Primary | ICD-10-CM

## 2025-07-24 DIAGNOSIS — I89.0 LYMPHEDEMA OF BOTH LOWER EXTREMITIES: ICD-10-CM

## 2025-07-24 PROCEDURE — 11045 DBRDMT SUBQ TISS EACH ADDL: CPT | Performed by: NURSE PRACTITIONER

## 2025-07-24 PROCEDURE — 11042 DBRDMT SUBQ TIS 1ST 20SQCM/<: CPT | Performed by: NURSE PRACTITIONER

## 2025-07-24 PROCEDURE — 11042 DBRDMT SUBQ TIS 1ST 20SQCM/<: CPT

## 2025-07-24 PROCEDURE — 11045 DBRDMT SUBQ TISS EACH ADDL: CPT

## 2025-07-24 RX ORDER — GINSENG 100 MG
CAPSULE ORAL PRN
OUTPATIENT
Start: 2025-07-24

## 2025-07-24 RX ORDER — NEOMYCIN/BACITRACIN/POLYMYXINB 3.5-400-5K
OINTMENT (GRAM) TOPICAL PRN
OUTPATIENT
Start: 2025-07-24

## 2025-07-24 RX ORDER — CLOBETASOL PROPIONATE 0.5 MG/G
OINTMENT TOPICAL PRN
OUTPATIENT
Start: 2025-07-24

## 2025-07-24 RX ORDER — LIDOCAINE HYDROCHLORIDE 20 MG/ML
JELLY TOPICAL PRN
OUTPATIENT
Start: 2025-07-24

## 2025-07-24 RX ORDER — SILVER SULFADIAZINE 10 MG/G
CREAM TOPICAL PRN
OUTPATIENT
Start: 2025-07-24

## 2025-07-24 RX ORDER — LIDOCAINE 50 MG/G
OINTMENT TOPICAL PRN
OUTPATIENT
Start: 2025-07-24

## 2025-07-24 RX ORDER — SODIUM CHLOR/HYPOCHLOROUS ACID 0.033 %
SOLUTION, IRRIGATION IRRIGATION PRN
OUTPATIENT
Start: 2025-07-24

## 2025-07-24 RX ORDER — LIDOCAINE HYDROCHLORIDE 40 MG/ML
SOLUTION TOPICAL PRN
Status: DISCONTINUED | OUTPATIENT
Start: 2025-07-24 | End: 2025-07-25 | Stop reason: HOSPADM

## 2025-07-24 RX ORDER — BETAMETHASONE DIPROPIONATE 0.5 MG/G
CREAM TOPICAL PRN
OUTPATIENT
Start: 2025-07-24

## 2025-07-24 RX ORDER — BACITRACIN ZINC AND POLYMYXIN B SULFATE 500; 1000 [USP'U]/G; [USP'U]/G
OINTMENT TOPICAL PRN
OUTPATIENT
Start: 2025-07-24

## 2025-07-24 RX ORDER — GENTAMICIN SULFATE 1 MG/G
OINTMENT TOPICAL PRN
OUTPATIENT
Start: 2025-07-24

## 2025-07-24 RX ORDER — LIDOCAINE HYDROCHLORIDE 40 MG/ML
SOLUTION TOPICAL PRN
OUTPATIENT
Start: 2025-07-24

## 2025-07-24 RX ORDER — LIDOCAINE 40 MG/G
CREAM TOPICAL PRN
OUTPATIENT
Start: 2025-07-24

## 2025-07-24 RX ORDER — MUPIROCIN 2 %
OINTMENT (GRAM) TOPICAL PRN
OUTPATIENT
Start: 2025-07-24

## 2025-07-24 RX ORDER — TRIAMCINOLONE ACETONIDE 1 MG/G
OINTMENT TOPICAL PRN
OUTPATIENT
Start: 2025-07-24

## 2025-07-24 RX ADMIN — LIDOCAINE HYDROCHLORIDE 5 ML: 40 SOLUTION TOPICAL at 09:41

## 2025-07-24 NOTE — PLAN OF CARE
Problem: Wound:  Goal: Will show signs of wound healing; wound closure and no evidence of infection  Description: Will show signs of wound healing; wound closure and no evidence of infection  7/24/2025 0959 by Ramandeep Maynard RN  Outcome: Progressing  7/24/2025 0843 by Ramandeep Maynard RN  Outcome: Progressing     Problem: Falls - Risk of:  Goal: Will remain free from falls  Description: Will remain free from falls  7/24/2025 0959 by Ramandeep Maynard RN  Outcome: Progressing  7/24/2025 0843 by Ramandeep Maynard RN  Outcome: Progressing

## 2025-07-24 NOTE — PROGRESS NOTES
outside of the business hours of the Wound Care Centers please contact your PCP or go to the nearest emergency room.               Electronically signed by KATINA Patterson CNP on 7/24/2025 at 3:18 PM

## 2025-07-25 LAB — CYCLOSPORINE BLD-MCNC: 109 UG/L (ref 50–300)

## 2025-07-27 RX ORDER — CYCLOSPORINE 25 MG/1
100 CAPSULE, LIQUID FILLED ORAL 2 TIMES DAILY
Qty: 720 CAPSULE | Refills: 3 | Status: SHIPPED | OUTPATIENT
Start: 2025-07-27 | End: 2026-07-27

## 2025-07-27 RX ORDER — MYCOPHENOLATE MOFETIL 250 MG/1
500 CAPSULE ORAL 2 TIMES DAILY
Qty: 360 CAPSULE | Refills: 3 | Status: SHIPPED | OUTPATIENT
Start: 2025-07-27 | End: 2026-07-27

## 2025-07-27 RX ORDER — PREDNISONE 5 MG/1
5 TABLET ORAL DAILY
Qty: 90 TABLET | Refills: 3 | Status: SHIPPED | OUTPATIENT
Start: 2025-07-27 | End: 2026-07-27

## 2025-07-29 ENCOUNTER — HOSPITAL ENCOUNTER (OUTPATIENT)
Dept: OTHER | Age: 70
Setting detail: THERAPIES SERIES
Discharge: HOME | End: 2025-07-29
Payer: MEDICARE

## 2025-07-29 VITALS
OXYGEN SATURATION: 96 % | BODY MASS INDEX: 38.23 KG/M2 | SYSTOLIC BLOOD PRESSURE: 156 MMHG | WEIGHT: 209 LBS | HEART RATE: 72 BPM | DIASTOLIC BLOOD PRESSURE: 71 MMHG | RESPIRATION RATE: 18 BRPM

## 2025-07-29 LAB
ANION GAP SERPL CALCULATED.3IONS-SCNC: 14 MMOL/L (ref 7–16)
BNP SERPL-MCNC: 1599 PG/ML (ref 0–125)
BUN SERPL-MCNC: 26 MG/DL (ref 8–23)
CALCIUM SERPL-MCNC: 9.8 MG/DL (ref 8.8–10.2)
CHLORIDE SERPL-SCNC: 103 MMOL/L (ref 98–107)
CO2 SERPL-SCNC: 22 MMOL/L (ref 22–29)
CREAT SERPL-MCNC: 1.8 MG/DL (ref 0.5–1)
GFR, ESTIMATED: 30 ML/MIN/1.73M2
GLUCOSE SERPL-MCNC: 124 MG/DL (ref 74–99)
POTASSIUM SERPL-SCNC: 5.1 MMOL/L (ref 3.5–5.1)
SODIUM SERPL-SCNC: 138 MMOL/L (ref 136–145)

## 2025-07-29 PROCEDURE — 36415 COLL VENOUS BLD VENIPUNCTURE: CPT

## 2025-07-29 PROCEDURE — 83880 ASSAY OF NATRIURETIC PEPTIDE: CPT

## 2025-07-29 PROCEDURE — 99214 OFFICE O/P EST MOD 30 MIN: CPT

## 2025-07-29 PROCEDURE — 80048 BASIC METABOLIC PNL TOTAL CA: CPT

## 2025-07-29 NOTE — PROGRESS NOTES
Congestive Heart Failure Clinic   [facility]     Referring Provider: Dr. Renteria  Primary Care Physician: Brennon Malave DO   Cardiologist: Dr. Nash  Nephrologist: Dr. Latham   Transplant: Dr. Terrance Membreno ( standing order for blood work q 3 months.)     HISTORY OF PRESENT ILLNESS:     Natali Wolff is a 70 y.o. (1955) female with a history of HFpEF (EF> 50%), most recent EF:  Lab Results   Component Value Date    LVEF 55 07/24/2023    LVEFMODE Echo 07/24/2023     She presents to the CHF clinic for ongoing evaluation and monitoring of heart failure.    In the CHF clinic today she denies any adverse symptoms except:  [] Dizziness or lightheadedness   [] Syncope or near syncope  [] Recent Fall  [] Shortness of breath at rest   [] Dyspnea with exertion  [] Decline in functional capacity (unable to perform activities they had previously been able to do)  [] Fatigue   [] Orthopnea  [] PND  [] Nocturnal cough  [] Hemoptysis  [] Chest pain, pressure, or discomfort  [] Palpitations   [] Abdominal distention  [] Abdominal bloating  [] Early satiety  [] Blood in stool   [] Diarrhea  [] Constipation  [] Nausea/Vomiting  [] Decreased urinary response to oral diuretic   [] Scrotal swelling   [x] Lower extremity edema RIGHT >LEFT  [] Used PRN doses of oral diuretic    Weight gain    Wt Readings from Last 3 Encounters:   07/29/25 94.8 kg (209 lb)   07/24/25 95.3 kg (210 lb)   06/26/25 95.3 kg (210 lb)       SOCIAL HISTORY:  [x] Denies tobacco, alcohol or illicit drug abuse  [] Tobacco use:  [] ETOH use:  [] Illicit drug use:        MEDICATIONS:    Allergies   Allergen Reactions    Cipro [Ciprofloxacin Hcl]     Macrodantin [Nitrofurantoin Macrocrystal] Nausea Only    Sulfa Antibiotics Nausea Only     Prior to Visit Medications    Medication Sig Taking? Authorizing Provider   folic acid (FOLVITE) 1 MG tablet Take 1 tablet by mouth daily Yes Brennon Malave DO

## 2025-08-08 DIAGNOSIS — Z94.0 IMMUNOSUPPRESSIVE MANAGEMENT ENCOUNTER FOLLOWING KIDNEY TRANSPLANT: ICD-10-CM

## 2025-08-08 DIAGNOSIS — Z94.0 KIDNEY REPLACED BY TRANSPLANT (HHS-HCC): ICD-10-CM
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
## 2025-08-08 DIAGNOSIS — Z79.899 IMMUNOSUPPRESSIVE MANAGEMENT ENCOUNTER FOLLOWING KIDNEY TRANSPLANT: ICD-10-CM

## 2025-08-08 DIAGNOSIS — E55.9 VITAMIN D DEFICIENCY: ICD-10-CM

## 2025-08-08 DIAGNOSIS — E21.3 HYPERPARATHYROIDISM (MULTI): ICD-10-CM

## 2025-08-14 ENCOUNTER — OFFICE VISIT (OUTPATIENT)
Dept: INTERNAL MEDICINE CLINIC | Age: 70
End: 2025-08-14

## 2025-08-14 ENCOUNTER — HOSPITAL ENCOUNTER (OUTPATIENT)
Dept: WOUND CARE | Age: 70
Discharge: HOME | End: 2025-08-14
Attending: NURSE PRACTITIONER
Payer: MEDICARE

## 2025-08-14 VITALS
SYSTOLIC BLOOD PRESSURE: 122 MMHG | BODY MASS INDEX: 38.09 KG/M2 | WEIGHT: 207 LBS | HEIGHT: 62 IN | OXYGEN SATURATION: 93 % | DIASTOLIC BLOOD PRESSURE: 80 MMHG | TEMPERATURE: 98 F | HEART RATE: 80 BPM

## 2025-08-14 VITALS
RESPIRATION RATE: 18 BRPM | HEART RATE: 75 BPM | SYSTOLIC BLOOD PRESSURE: 152 MMHG | WEIGHT: 209 LBS | BODY MASS INDEX: 38.46 KG/M2 | DIASTOLIC BLOOD PRESSURE: 66 MMHG | TEMPERATURE: 97.2 F | HEIGHT: 62 IN

## 2025-08-14 DIAGNOSIS — L97.912 TRAUMATIC ULCER OF LOWER EXTREMITY, RIGHT, WITH FAT LAYER EXPOSED (HCC): ICD-10-CM

## 2025-08-14 DIAGNOSIS — F31.9 BIPOLAR 1 DISORDER (HCC): ICD-10-CM

## 2025-08-14 DIAGNOSIS — E55.9 VITAMIN D DEFICIENCY: ICD-10-CM

## 2025-08-14 DIAGNOSIS — I50.22 CHRONIC SYSTOLIC (CONGESTIVE) HEART FAILURE (HCC): ICD-10-CM

## 2025-08-14 DIAGNOSIS — N18.32 STAGE 3B CHRONIC KIDNEY DISEASE (HCC): ICD-10-CM

## 2025-08-14 DIAGNOSIS — E78.2 MIXED HYPERLIPIDEMIA: ICD-10-CM

## 2025-08-14 DIAGNOSIS — L97.922: Primary | ICD-10-CM

## 2025-08-14 DIAGNOSIS — J44.1 CHRONIC OBSTRUCTIVE PULMONARY DISEASE WITH (ACUTE) EXACERBATION (HCC): ICD-10-CM

## 2025-08-14 DIAGNOSIS — I10 PRIMARY HYPERTENSION: Primary | ICD-10-CM

## 2025-08-14 DIAGNOSIS — Z94.0 KIDNEY TRANSPLANTED: ICD-10-CM

## 2025-08-14 DIAGNOSIS — I89.0 LYMPHEDEMA OF BOTH LOWER EXTREMITIES: ICD-10-CM

## 2025-08-14 PROCEDURE — 11042 DBRDMT SUBQ TIS 1ST 20SQCM/<: CPT | Performed by: NURSE PRACTITIONER

## 2025-08-14 PROCEDURE — 87205 SMEAR GRAM STAIN: CPT

## 2025-08-14 PROCEDURE — 87070 CULTURE OTHR SPECIMN AEROBIC: CPT

## 2025-08-14 PROCEDURE — 11042 DBRDMT SUBQ TIS 1ST 20SQCM/<: CPT

## 2025-08-14 PROCEDURE — 86403 PARTICLE AGGLUT ANTBDY SCRN: CPT

## 2025-08-14 RX ORDER — LIDOCAINE 50 MG/G
OINTMENT TOPICAL PRN
OUTPATIENT
Start: 2025-08-14

## 2025-08-14 RX ORDER — SILVER SULFADIAZINE 10 MG/G
CREAM TOPICAL PRN
OUTPATIENT
Start: 2025-08-14

## 2025-08-14 RX ORDER — GINSENG 100 MG
CAPSULE ORAL PRN
OUTPATIENT
Start: 2025-08-14

## 2025-08-14 RX ORDER — LIDOCAINE 40 MG/G
CREAM TOPICAL PRN
OUTPATIENT
Start: 2025-08-14

## 2025-08-14 RX ORDER — LIDOCAINE HYDROCHLORIDE 40 MG/ML
SOLUTION TOPICAL PRN
OUTPATIENT
Start: 2025-08-14

## 2025-08-14 RX ORDER — GENTAMICIN SULFATE 1 MG/G
OINTMENT TOPICAL PRN
OUTPATIENT
Start: 2025-08-14

## 2025-08-14 RX ORDER — SODIUM CHLOR/HYPOCHLOROUS ACID 0.033 %
SOLUTION, IRRIGATION IRRIGATION PRN
OUTPATIENT
Start: 2025-08-14

## 2025-08-14 RX ORDER — SIMVASTATIN 40 MG
40 TABLET ORAL NIGHTLY
Qty: 90 TABLET | Refills: 2 | Status: SHIPPED | OUTPATIENT
Start: 2025-08-14

## 2025-08-14 RX ORDER — CLOBETASOL PROPIONATE 0.5 MG/G
OINTMENT TOPICAL PRN
OUTPATIENT
Start: 2025-08-14

## 2025-08-14 RX ORDER — LIDOCAINE HYDROCHLORIDE 40 MG/ML
SOLUTION TOPICAL PRN
Status: DISCONTINUED | OUTPATIENT
Start: 2025-08-14 | End: 2025-08-15 | Stop reason: HOSPADM

## 2025-08-14 RX ORDER — BACITRACIN ZINC AND POLYMYXIN B SULFATE 500; 1000 [USP'U]/G; [USP'U]/G
OINTMENT TOPICAL PRN
OUTPATIENT
Start: 2025-08-14

## 2025-08-14 RX ORDER — LIDOCAINE HYDROCHLORIDE 20 MG/ML
JELLY TOPICAL PRN
OUTPATIENT
Start: 2025-08-14

## 2025-08-14 RX ORDER — MUPIROCIN 2 %
OINTMENT (GRAM) TOPICAL PRN
OUTPATIENT
Start: 2025-08-14

## 2025-08-14 RX ORDER — NEOMYCIN/BACITRACIN/POLYMYXINB 3.5-400-5K
OINTMENT (GRAM) TOPICAL PRN
OUTPATIENT
Start: 2025-08-14

## 2025-08-14 RX ORDER — BETAMETHASONE DIPROPIONATE 0.5 MG/G
CREAM TOPICAL PRN
OUTPATIENT
Start: 2025-08-14

## 2025-08-14 RX ORDER — TRIAMCINOLONE ACETONIDE 1 MG/G
OINTMENT TOPICAL PRN
OUTPATIENT
Start: 2025-08-14

## 2025-08-14 RX ADMIN — LIDOCAINE HYDROCHLORIDE 15 ML: 40 SOLUTION TOPICAL at 09:43

## 2025-08-14 ASSESSMENT — ENCOUNTER SYMPTOMS
BACK PAIN: 0
ABDOMINAL PAIN: 0
CHOKING: 0
VOMITING: 0
DIARRHEA: 0
WHEEZING: 0
CONSTIPATION: 0
CHEST TIGHTNESS: 0
COUGH: 0
BLOOD IN STOOL: 0
NAUSEA: 0
SHORTNESS OF BREATH: 0

## 2025-08-14 ASSESSMENT — PAIN SCALES - GENERAL: PAINLEVEL_OUTOF10: 0

## 2025-08-17 LAB
MICROORGANISM SPEC CULT: ABNORMAL
MICROORGANISM/AGENT SPEC: ABNORMAL
SERVICE CMNT-IMP: ABNORMAL
SPECIMEN DESCRIPTION: ABNORMAL

## 2025-08-20 LAB
ALBUMIN: 4.1 G/DL (ref 3.5–5.2)
ANION GAP SERPL CALCULATED.3IONS-SCNC: 17 MMOL/L (ref 7–16)
BASOPHILS # BLD: 0 K/UL (ref 0–0.2)
BASOPHILS NFR BLD: 0 % (ref 0–2)
BUN SERPL-MCNC: 34 MG/DL (ref 8–23)
CALCIUM SERPL-MCNC: 10.4 MG/DL (ref 8.8–10.2)
CHLORIDE SERPL-SCNC: 102 MMOL/L (ref 98–107)
CO2 SERPL-SCNC: 22 MMOL/L (ref 22–29)
CREAT SERPL-MCNC: 1.6 MG/DL (ref 0.5–1)
EOSINOPHIL # BLD: 0 K/UL (ref 0.05–0.5)
EOSINOPHILS RELATIVE PERCENT: 0 % (ref 0–6)
ERYTHROCYTE [DISTWIDTH] IN BLOOD BY AUTOMATED COUNT: 15.2 % (ref 11.5–15)
GFR, ESTIMATED: 36 ML/MIN/1.73M2
GLUCOSE SERPL-MCNC: 135 MG/DL (ref 74–99)
HCT VFR BLD AUTO: 43.4 % (ref 34–48)
HGB BLD-MCNC: 12.5 G/DL (ref 11.5–15.5)
LYMPHOCYTES NFR BLD: 0.51 K/UL (ref 1.5–4)
LYMPHOCYTES RELATIVE PERCENT: 4 % (ref 20–42)
MAGNESIUM SERPL-MCNC: 2.2 MG/DL (ref 1.6–2.4)
MCH RBC QN AUTO: 30 PG (ref 26–35)
MCHC RBC AUTO-ENTMCNC: 28.8 G/DL (ref 32–34.5)
MCV RBC AUTO: 104.3 FL (ref 80–99.9)
MONOCYTES NFR BLD: 0.51 K/UL (ref 0.1–0.95)
MONOCYTES NFR BLD: 4 % (ref 2–12)
NEUTROPHILS NFR BLD: 91 % (ref 43–80)
NEUTS SEG NFR BLD: 10.77 K/UL (ref 1.8–7.3)
PHOSPHATE SERPL-MCNC: 3.4 MG/DL (ref 2.5–4.5)
PLATELET, FLUORESCENCE: 147 K/UL (ref 130–450)
PMV BLD AUTO: 14.1 FL (ref 7–12)
POTASSIUM SERPL-SCNC: 4.9 MMOL/L (ref 3.5–5.1)
RBC # BLD AUTO: 4.16 M/UL (ref 3.5–5.5)
RBC # BLD: ABNORMAL 10*6/UL
SODIUM SERPL-SCNC: 140 MMOL/L (ref 136–145)
TOTAL PROTEIN, URINE: 39 MG/DL (ref 0–12)
WBC OTHER # BLD: 11.8 K/UL (ref 4.5–11.5)

## 2025-08-22 ENCOUNTER — TELEPHONE (OUTPATIENT)
Dept: CARDIOLOGY CLINIC | Age: 70
End: 2025-08-22

## 2025-08-22 LAB — CYCLOSPORINE BLD-MCNC: 78 UG/L (ref 50–300)

## 2025-08-25 ENCOUNTER — OFFICE VISIT (OUTPATIENT)
Dept: CARDIOLOGY CLINIC | Age: 70
End: 2025-08-25
Payer: MEDICARE

## 2025-08-25 VITALS
WEIGHT: 204 LBS | RESPIRATION RATE: 16 BRPM | HEART RATE: 74 BPM | DIASTOLIC BLOOD PRESSURE: 70 MMHG | HEIGHT: 62 IN | SYSTOLIC BLOOD PRESSURE: 112 MMHG | BODY MASS INDEX: 37.54 KG/M2

## 2025-08-25 DIAGNOSIS — Z86.79 HISTORY OF CARDIOMYOPATHY: ICD-10-CM

## 2025-08-25 DIAGNOSIS — D68.9 CLOTTING DISORDER: ICD-10-CM

## 2025-08-25 DIAGNOSIS — I44.7 LBBB (LEFT BUNDLE BRANCH BLOCK): ICD-10-CM

## 2025-08-25 DIAGNOSIS — I50.32 CHRONIC HEART FAILURE WITH PRESERVED EJECTION FRACTION (HFPEF) (HCC): Primary | ICD-10-CM

## 2025-08-25 DIAGNOSIS — I10 PRIMARY HYPERTENSION: ICD-10-CM

## 2025-08-25 PROCEDURE — 3074F SYST BP LT 130 MM HG: CPT | Performed by: INTERNAL MEDICINE

## 2025-08-25 PROCEDURE — G8427 DOCREV CUR MEDS BY ELIG CLIN: HCPCS | Performed by: INTERNAL MEDICINE

## 2025-08-25 PROCEDURE — 1036F TOBACCO NON-USER: CPT | Performed by: INTERNAL MEDICINE

## 2025-08-25 PROCEDURE — 99214 OFFICE O/P EST MOD 30 MIN: CPT | Performed by: INTERNAL MEDICINE

## 2025-08-25 PROCEDURE — 1123F ACP DISCUSS/DSCN MKR DOCD: CPT | Performed by: INTERNAL MEDICINE

## 2025-08-25 PROCEDURE — 93000 ELECTROCARDIOGRAM COMPLETE: CPT | Performed by: INTERNAL MEDICINE

## 2025-08-25 PROCEDURE — 3017F COLORECTAL CA SCREEN DOC REV: CPT | Performed by: INTERNAL MEDICINE

## 2025-08-25 PROCEDURE — 1090F PRES/ABSN URINE INCON ASSESS: CPT | Performed by: INTERNAL MEDICINE

## 2025-08-25 PROCEDURE — G8417 CALC BMI ABV UP PARAM F/U: HCPCS | Performed by: INTERNAL MEDICINE

## 2025-08-25 PROCEDURE — G2211 COMPLEX E/M VISIT ADD ON: HCPCS | Performed by: INTERNAL MEDICINE

## 2025-08-25 PROCEDURE — 3078F DIAST BP <80 MM HG: CPT | Performed by: INTERNAL MEDICINE

## 2025-08-25 PROCEDURE — G8399 PT W/DXA RESULTS DOCUMENT: HCPCS | Performed by: INTERNAL MEDICINE

## 2025-08-25 PROCEDURE — 1160F RVW MEDS BY RX/DR IN RCRD: CPT | Performed by: INTERNAL MEDICINE

## 2025-08-25 PROCEDURE — 1159F MED LIST DOCD IN RCRD: CPT | Performed by: INTERNAL MEDICINE

## 2025-08-25 RX ORDER — METOPROLOL TARTRATE 25 MG/1
TABLET, FILM COATED ORAL
Qty: 90 TABLET | Refills: 2 | Status: SHIPPED | OUTPATIENT
Start: 2025-08-25

## 2025-09-04 ENCOUNTER — HOSPITAL ENCOUNTER (OUTPATIENT)
Dept: WOUND CARE | Age: 70
Discharge: HOME | End: 2025-09-04
Attending: NURSE PRACTITIONER
Payer: MEDICARE

## 2025-09-04 VITALS
SYSTOLIC BLOOD PRESSURE: 154 MMHG | RESPIRATION RATE: 18 BRPM | WEIGHT: 204 LBS | TEMPERATURE: 96.9 F | HEIGHT: 62 IN | HEART RATE: 80 BPM | BODY MASS INDEX: 37.54 KG/M2 | DIASTOLIC BLOOD PRESSURE: 76 MMHG

## 2025-09-04 DIAGNOSIS — L97.912 TRAUMATIC ULCER OF LOWER EXTREMITY, RIGHT, WITH FAT LAYER EXPOSED (HCC): ICD-10-CM

## 2025-09-04 DIAGNOSIS — L97.922: Primary | ICD-10-CM

## 2025-09-04 DIAGNOSIS — I89.0 LYMPHEDEMA OF BOTH LOWER EXTREMITIES: ICD-10-CM

## 2025-09-04 PROCEDURE — 87070 CULTURE OTHR SPECIMN AEROBIC: CPT

## 2025-09-04 PROCEDURE — 87205 SMEAR GRAM STAIN: CPT

## 2025-09-04 PROCEDURE — 87077 CULTURE AEROBIC IDENTIFY: CPT

## 2025-09-04 PROCEDURE — 11042 DBRDMT SUBQ TIS 1ST 20SQCM/<: CPT | Performed by: NURSE PRACTITIONER

## 2025-09-04 PROCEDURE — 11042 DBRDMT SUBQ TIS 1ST 20SQCM/<: CPT

## 2025-09-04 RX ORDER — LIDOCAINE HYDROCHLORIDE 40 MG/ML
SOLUTION TOPICAL PRN
OUTPATIENT
Start: 2025-09-04

## 2025-09-04 RX ORDER — SILVER SULFADIAZINE 10 MG/G
CREAM TOPICAL PRN
OUTPATIENT
Start: 2025-09-04

## 2025-09-04 RX ORDER — MUPIROCIN 2 %
OINTMENT (GRAM) TOPICAL PRN
OUTPATIENT
Start: 2025-09-04

## 2025-09-04 RX ORDER — LIDOCAINE 40 MG/G
CREAM TOPICAL PRN
OUTPATIENT
Start: 2025-09-04

## 2025-09-04 RX ORDER — LIDOCAINE HYDROCHLORIDE 40 MG/ML
SOLUTION TOPICAL PRN
Status: DISCONTINUED | OUTPATIENT
Start: 2025-09-04 | End: 2025-09-05 | Stop reason: HOSPADM

## 2025-09-04 RX ORDER — GINSENG 100 MG
CAPSULE ORAL PRN
OUTPATIENT
Start: 2025-09-04

## 2025-09-04 RX ORDER — BACITRACIN ZINC AND POLYMYXIN B SULFATE 500; 1000 [USP'U]/G; [USP'U]/G
OINTMENT TOPICAL PRN
OUTPATIENT
Start: 2025-09-04

## 2025-09-04 RX ORDER — NEOMYCIN/BACITRACIN/POLYMYXINB 3.5-400-5K
OINTMENT (GRAM) TOPICAL PRN
OUTPATIENT
Start: 2025-09-04

## 2025-09-04 RX ORDER — GENTAMICIN SULFATE 1 MG/G
OINTMENT TOPICAL PRN
OUTPATIENT
Start: 2025-09-04

## 2025-09-04 RX ORDER — SODIUM CHLOR/HYPOCHLOROUS ACID 0.033 %
SOLUTION, IRRIGATION IRRIGATION PRN
OUTPATIENT
Start: 2025-09-04

## 2025-09-04 RX ORDER — CLOBETASOL PROPIONATE 0.5 MG/G
OINTMENT TOPICAL PRN
OUTPATIENT
Start: 2025-09-04

## 2025-09-04 RX ORDER — LIDOCAINE HYDROCHLORIDE 20 MG/ML
JELLY TOPICAL PRN
OUTPATIENT
Start: 2025-09-04

## 2025-09-04 RX ORDER — TRIAMCINOLONE ACETONIDE 1 MG/G
OINTMENT TOPICAL PRN
OUTPATIENT
Start: 2025-09-04

## 2025-09-04 RX ORDER — BETAMETHASONE DIPROPIONATE 0.5 MG/G
CREAM TOPICAL PRN
OUTPATIENT
Start: 2025-09-04

## 2025-09-04 RX ORDER — LIDOCAINE 50 MG/G
OINTMENT TOPICAL PRN
OUTPATIENT
Start: 2025-09-04

## 2025-09-04 RX ADMIN — LIDOCAINE HYDROCHLORIDE 10 ML: 40 SOLUTION TOPICAL at 08:28

## 2025-09-04 ASSESSMENT — PAIN SCALES - GENERAL: PAINLEVEL_OUTOF10: 0

## 2025-09-06 LAB
MICROORGANISM SPEC CULT: ABNORMAL
MICROORGANISM/AGENT SPEC: ABNORMAL
SERVICE CMNT-IMP: ABNORMAL
SPECIMEN DESCRIPTION: ABNORMAL

## (undated) DEVICE — COVER,LIGHT HANDLE,FLX,1/PK: Brand: MEDLINE INDUSTRIES, INC.

## (undated) DEVICE — MARKER,SKIN,WI/RULER AND LABELS: Brand: MEDLINE

## (undated) DEVICE — GENERATOR ELECSURG FORCETRAID

## (undated) DEVICE — SHEET,DRAPE,40X58,STERILE: Brand: MEDLINE

## (undated) DEVICE — TOWEL,OR,DSP,ST,BLUE,STD,6/PK,12PK/CS: Brand: MEDLINE

## (undated) DEVICE — KIT BEDSIDE REVITAL OX 500ML

## (undated) DEVICE — MEDI-VAC NON-CONDUCTIVE SUCTION TUBING: Brand: CARDINAL HEALTH

## (undated) DEVICE — RELOAD STPL L75MM OPN H3.8MM CLS 1.5MM WIRE DIA0.2MM REG

## (undated) DEVICE — TROCAR: Brand: KII FIOS FIRST ENTRY

## (undated) DEVICE — STERILE SURGICAL LUBRICANT, METAL TUBE: Brand: SURGILUBE

## (undated) DEVICE — NEEDLE HYPO 25GA L1.5IN BLU POLYPR HUB S STL REG BVL STR

## (undated) DEVICE — BANDAGE GZ W2XL75IN ST RAYON POLY CNFRM STRTCH LTWT

## (undated) DEVICE — MEDI-VAC YANKAUER SUCTION HANDLE W/BULBOUS TIP: Brand: CARDINAL HEALTH

## (undated) DEVICE — COLOSTOMY/ILEOSTOMY KIT,FLEXWEAR: Brand: NEW IMAGE

## (undated) DEVICE — YANKAUER,BULB TIP,W/O VENT,RIGID,STERILE: Brand: MEDLINE

## (undated) DEVICE — GOWN,SIRUS,POLYRNF,BRTHSLV,XLN/XXL,18/CS: Brand: MEDLINE

## (undated) DEVICE — COVER,TABLE,44X90,STERILE: Brand: MEDLINE

## (undated) DEVICE — SIPS DUAL 2 MINUTE TIP

## (undated) DEVICE — GLOVE ORANGE PI 8   MSG9080

## (undated) DEVICE — MASK,FACE,MAXFLUIDPROTECT,SHIELD/ERLPS: Brand: MEDLINE

## (undated) DEVICE — GLOVE SURG SZ 9 THK91MIL LTX FREE SYN POLYISOPRENE ANTI

## (undated) DEVICE — PUMP SUC IRR TBNG L10FT W/ HNDPC ASSEMB STRYKEFLOW 2

## (undated) DEVICE — COAXIAL HIGH FLOW TIP WITH SOFT SHIELD

## (undated) DEVICE — KENDALL 450 SERIES MONITORING FOAM ELECTRODE - RECTANGULAR SHAPE ( 3/PK): Brand: KENDALL

## (undated) DEVICE — NDL CNTR 40CT FM MAG: Brand: MEDLINE INDUSTRIES, INC.

## (undated) DEVICE — GLOVE ORANGE PI 7 1/2   MSG9075

## (undated) DEVICE — 40586 ADVANCED TRENDELENBURG POSITIONING KIT: Brand: 40586 ADVANCED TRENDELENBURG POSITIONING KIT

## (undated) DEVICE — BLOCK BITE 60FR CAREGUARD

## (undated) DEVICE — SYRINGE IRRIG 60ML SFT PLIABLE BLB EZ TO GRP 1 HND USE W/

## (undated) DEVICE — SKIN PREP TRAY 4 COMPARTM TRAY: Brand: MEDLINE INDUSTRIES, INC.

## (undated) DEVICE — Z INACTIVE USE 2660664 SOLUTION IRRIG 3000ML 0.9% SOD CHL USP UROMATIC PLAS CONT

## (undated) DEVICE — TUBING, SUCTION, 1/4" X 10', STRAIGHT: Brand: MEDLINE

## (undated) DEVICE — SEALER TISS L45CM ADV BPLR STR TIP LAP APPRCH ENSEAL G2

## (undated) DEVICE — GOWN,SIRUS,FABRNF,XL,20/CS: Brand: MEDLINE

## (undated) DEVICE — STAPLER INT L28CM DIA29MM CLS STPL H10-2.5MM OPN LEG L5.5MM

## (undated) DEVICE — CAMERA STRYKER 1488 HD GEN

## (undated) DEVICE — DRESSING PETRO W3XL3IN OIL EMUL N ADH GZ KNIT IMPREG CELOS

## (undated) DEVICE — PACK,EXTREMITY I: Brand: MEDLINE

## (undated) DEVICE — 4-PORT MANIFOLD: Brand: NEPTUNE 2

## (undated) DEVICE — RELOAD STPL L60MM H1.5-3.6MM REG TISS BLU GRIPPING SURF B

## (undated) DEVICE — GOWN,SIRUS,NONRNF,SETINSLV,XL,20/CS: Brand: MEDLINE

## (undated) DEVICE — APPLIER CLP M/L SHFT DIA5MM 15 LIG LIGAMAX 5

## (undated) DEVICE — Device

## (undated) DEVICE — ELECTRODE PT RET AD L9FT HI MOIST COND ADH HYDRGEL CORDED

## (undated) DEVICE — INSUFFLATION NEEDLE TO ESTABLISH PNEUMOPERITONEUM.: Brand: INSUFFLATION NEEDLE

## (undated) DEVICE — BLADE,STAINLESS-STEEL,15,STRL,DISPOSABLE: Brand: MEDLINE

## (undated) DEVICE — LAPAROSCOPIC SCISSORS: Brand: EPIX LAPAROSCOPIC SCISSORS

## (undated) DEVICE — GAUZE,SPONGE,4"X4",16PLY,XRAY,STRL,LF: Brand: MEDLINE

## (undated) DEVICE — SWAB CULT SGL AMIES W/O CHAR FOR THRT VAG SKIN HRT CULTSWAB

## (undated) DEVICE — PMI PTFE COATED LAPAROSCOPIC WIRE L-HOOK 44 CM: Brand: PMI

## (undated) DEVICE — DOUBLE BASIN SET: Brand: MEDLINE INDUSTRIES, INC.

## (undated) DEVICE — PACK SURG LAP CHOLE CUSTOM

## (undated) DEVICE — NEEDLE FLTR 18GA L1.5IN MEM THK5UM BLNT DISP

## (undated) DEVICE — GARMENT,MEDLINE,DVT,INT,CALF,MED, GEN2: Brand: MEDLINE

## (undated) DEVICE — PLUMEPORT LAPAROSCOPIC SMOKE FILTRATION DEVICE: Brand: PLUMEPORT ACTIV

## (undated) DEVICE — TROCAR: Brand: KII SLEEVE

## (undated) DEVICE — BLADE ES ELASTOMERIC COAT INSUL DURABLE BEND UPTO 90DEG

## (undated) DEVICE — HYDROPHILIC COATED RED RUBBER URETHRAL CATHETER, SMOOTH ROUNDED TIP, 20 FR (6.7 MM): Brand: DOVER

## (undated) DEVICE — TRAY PROCED CUSTOM GASTROINTESTINAL

## (undated) DEVICE — 450 ML BOTTLE OF 0.05% CHLORHEXIDINE GLUCONATE IN 99.95% STERILE WATER FOR IRRIGATION, USP AND APPLICATOR.: Brand: IRRISEPT ANTIMICROBIAL WOUND LAVAGE

## (undated) DEVICE — APPLIER CLP L SHFT DIA12MM 20 ROT MULT LIGACLP

## (undated) DEVICE — STOCKINETTE ORTH W9XL36IN COT 2 PLY HLLW FOR HANDLING LMB

## (undated) DEVICE — TOTAL TRAY, 16FR 10ML SIL FOLEY, URN: Brand: MEDLINE

## (undated) DEVICE — SYRINGE MED 10ML TRNSLUC BRL PLUNG BLK MRK POLYPR CTRL

## (undated) DEVICE — STAPLER SKIN L440MM 32MM LNG 12 FIRING B FRM PWR + GRIPPING

## (undated) DEVICE — SPONGE LAP W18XL18IN WHT COT 4 PLY FLD STRUNG RADPQ DISP ST

## (undated) DEVICE — GAUZE,SPONGE,2"X2",8PLY,STERILE,LF,2'S: Brand: MEDLINE

## (undated) DEVICE — WIPES SKIN CLOTH READYPREP 9 X 10.5 IN 2% CHLORHEX GLUCONATE CHG PREOP

## (undated) DEVICE — STAPLER INT L75MM CUT LN L73MM STPL LN L77MM BLU B FRM 8

## (undated) DEVICE — GOWN ISOLATN REG YEL M WT MULTIPLY SIDETIE LEV 2

## (undated) DEVICE — SET MAJOR INSTR HOUSE

## (undated) DEVICE — APPLICATOR MEDICATED 26 CC SOLUTION HI LT ORNG CHLORAPREP

## (undated) DEVICE — BASIC DOUBLE BASIN 2-LF: Brand: MEDLINE INDUSTRIES, INC.

## (undated) DEVICE — STAPLER EXT 65MM S STL AUTO DISP PURSTRING

## (undated) DEVICE — GLOVE ORANGE PI 8 1/2   MSG9085